# Patient Record
Sex: FEMALE | Race: WHITE | NOT HISPANIC OR LATINO | Employment: OTHER | URBAN - METROPOLITAN AREA
[De-identification: names, ages, dates, MRNs, and addresses within clinical notes are randomized per-mention and may not be internally consistent; named-entity substitution may affect disease eponyms.]

---

## 2017-01-04 ENCOUNTER — OFFICE VISIT (OUTPATIENT)
Dept: PODIATRY | Facility: CLINIC | Age: 62
End: 2017-01-04
Payer: COMMERCIAL

## 2017-01-04 ENCOUNTER — OFFICE VISIT (OUTPATIENT)
Dept: INFECTIOUS DISEASES | Facility: CLINIC | Age: 62
End: 2017-01-04
Payer: COMMERCIAL

## 2017-01-04 VITALS
BODY MASS INDEX: 48.93 KG/M2 | HEART RATE: 82 BPM | SYSTOLIC BLOOD PRESSURE: 127 MMHG | DIASTOLIC BLOOD PRESSURE: 77 MMHG | HEIGHT: 64 IN | WEIGHT: 286.63 LBS | TEMPERATURE: 99 F

## 2017-01-04 VITALS
RESPIRATION RATE: 18 BRPM | HEART RATE: 73 BPM | WEIGHT: 286 LBS | BODY MASS INDEX: 48.83 KG/M2 | SYSTOLIC BLOOD PRESSURE: 156 MMHG | DIASTOLIC BLOOD PRESSURE: 76 MMHG | HEIGHT: 64 IN

## 2017-01-04 DIAGNOSIS — Z89.422 STATUS POST AMPUTATION OF LESSER TOE OF LEFT FOOT: ICD-10-CM

## 2017-01-04 DIAGNOSIS — M86.172 OSTEOMYELITIS OF ANKLE OR FOOT, ACUTE, LEFT: Primary | ICD-10-CM

## 2017-01-04 DIAGNOSIS — N18.30 CKD (CHRONIC KIDNEY DISEASE) STAGE 3, GFR 30-59 ML/MIN: ICD-10-CM

## 2017-01-04 DIAGNOSIS — Z47.89 AFTERCARE FOLLOWING SURGERY OF THE MUSCULOSKELETAL SYSTEM, NEC: Primary | ICD-10-CM

## 2017-01-04 DIAGNOSIS — M25.521 RIGHT ELBOW PAIN: ICD-10-CM

## 2017-01-04 PROCEDURE — 3046F HEMOGLOBIN A1C LEVEL >9.0%: CPT | Mod: S$GLB,,, | Performed by: INTERNAL MEDICINE

## 2017-01-04 PROCEDURE — 3074F SYST BP LT 130 MM HG: CPT | Mod: S$GLB,,, | Performed by: INTERNAL MEDICINE

## 2017-01-04 PROCEDURE — 4010F ACE/ARB THERAPY RXD/TAKEN: CPT | Mod: S$GLB,,, | Performed by: INTERNAL MEDICINE

## 2017-01-04 PROCEDURE — 2022F DILAT RTA XM EVC RTNOPTHY: CPT | Mod: S$GLB,,, | Performed by: INTERNAL MEDICINE

## 2017-01-04 PROCEDURE — 99999 PR PBB SHADOW E&M-EST. PATIENT-LVL III: CPT | Mod: PBBFAC,,, | Performed by: PODIATRIST

## 2017-01-04 PROCEDURE — 99024 POSTOP FOLLOW-UP VISIT: CPT | Mod: S$GLB,,, | Performed by: PODIATRIST

## 2017-01-04 PROCEDURE — 1159F MED LIST DOCD IN RCRD: CPT | Mod: S$GLB,,, | Performed by: INTERNAL MEDICINE

## 2017-01-04 PROCEDURE — 99213 OFFICE O/P EST LOW 20 MIN: CPT | Mod: S$GLB,,, | Performed by: INTERNAL MEDICINE

## 2017-01-04 PROCEDURE — 3078F DIAST BP <80 MM HG: CPT | Mod: S$GLB,,, | Performed by: INTERNAL MEDICINE

## 2017-01-04 PROCEDURE — 99999 PR PBB SHADOW E&M-EST. PATIENT-LVL III: CPT | Mod: PBBFAC,,, | Performed by: INTERNAL MEDICINE

## 2017-01-04 RX ORDER — DOXYCYCLINE HYCLATE 100 MG
100 TABLET ORAL 2 TIMES DAILY
Qty: 28 TABLET | Refills: 3 | Status: SHIPPED | OUTPATIENT
Start: 2017-01-04 | End: 2017-08-30 | Stop reason: SDUPTHER

## 2017-01-04 NOTE — MR AVS SNAPSHOT
[place] [facility]   [facility]  [place] [address]  Phone: [phone]                  Kylie King   2017 1:45 PM   Office Visit    Description:  Female : 1955   Provider:  Jose Delacruz Jr., DPM   Department:  [place] [facility]           Reason for Visit     PCP     Diabetic Foot Exam     Nail Care     Post-op Evaluation           Diagnoses this Visit        Comments    Aftercare following surgery of the musculoskeletal system, NEC    -  Primary     Status post amputation of lesser toe of left foot                To Do List           Future Appointments        Provider Department Dept Phone    2017 10:30 AM Spencer Roldan MD [facility] - Infectious Diseases [phone]    3/8/2017 10:45 AM Jose Delacruz Jr., DPM [place] [facility] [phone]      Goals (5 Years of Data)     None      Follow-Up and Disposition     Return in about 2 months (around 3/4/2017).    Follow-up and Disposition History      [facility] On [facility]     [facility] -  Assistance  Registered nurses in the [facility] Center provide clinical advisement, health education, appointment booking, and other advisory services.  Call for this free service at [phone].             Medications           Message regarding Medications     Verify the changes and/or additions to your medication regime listed below are the same as discussed with your clinician today.  If any of these changes or additions are incorrect, please notify your healthcare provider.             Verify that the below list of medications is an accurate representation of the medications you are currently taking.  If none reported, the list may be blank. If incorrect, please contact your healthcare provider. Carry this list with you in case of emergency.           Current Medications     ACETAMINOPHEN WITH CODEINE (TYLENOL-CODEINE #3 ORAL) Take by mouth daily as needed.    amlodipine (NORVASC) 10 MG  "tablet Take 1 tablet (10 mg total) by mouth every evening.    blood sugar diagnostic (ONETOUCH ULTRA TEST) Strp 1 strip by Misc.(Non-Drug; Combo Route) route 4 (four) times daily before meals and nightly.    carvedilol (COREG) 25 MG tablet Take 1 tablet (25 mg total) by mouth 2 (two) times daily.    citalopram (CELEXA) 20 MG tablet Take 1 tablet (20 mg total) by mouth nightly.    doxycycline (VIBRA-TABS) 100 MG tablet Take 1 tablet (100 mg total) by mouth 2 (two) times daily. Remain upright for 60 minutes after each dose.    furosemide (LASIX) 40 MG tablet Take 1 tablet (40 mg total) by mouth once daily.    gabapentin (NEURONTIN) 400 MG capsule Take 1 capsule (400 mg total) by mouth 2 (two) times daily.    hydrALAZINE (APRESOLINE) 50 MG tablet Take 1 tablet (50 mg total) by mouth every 8 (eight) hours.    insulin detemir (LEVEMIR FLEXTOUCH) 100 unit/mL (3 mL) SubQ InPn pen Inject 45 Units into the skin 2 (two) times daily.    insulin lispro (HUMALOG KWIKPEN) 100 unit/mL InPn pen -200, take 15 units subcutaneous, 201-250, take 18 units subcutaneous, 251-300, take 20 units subcutaneous 3 times daily    insulin needles, disposable, (PEN NEEDLE, DIABETIC) 31 Ndle Patient needs 5 needles a day    insulin needles, disposable, 32 x 5/32 " Ndle 1 Device by Misc.(Non-Drug; Combo Route) route 5 (five) times daily.    lancets (ONETOUCH DELICA LANCETS) 33 gauge Misc 1 lancet by Misc.(Non-Drug; Combo Route) route 4 (four) times daily before meals and nightly.    letrozole (FEMARA) 2.5 mg Tab Take 1 tablet (2.5 mg total) by mouth every evening.    lisinopril (PRINIVIL,ZESTRIL) 40 MG tablet Take 1 tablet (40 mg total) by mouth once daily. 1 Tablet Oral Every day    pravastatin (PRAVACHOL) 40 MG tablet Take 1 tablet (40 mg total) by mouth every evening.           Clinical Reference Information           Vital Signs - Last Recorded  Most recent update: 1/4/2017  2:10 PM by Enid Mallory    BP Pulse Resp Ht Wt BMI    (!) " "156/76 73 18 5' 4" (1.626 m) 129.7 kg (286 lb) 49.09 kg/m2      Blood Pressure          Most Recent Value    BP  (!)  156/76      Allergies as of 1/4/2017     Cyclobenzaprine    Erythromycin    Keflex [Cephalexin]      Immunizations Administered on Date of Encounter - 1/4/2017     None      Instructions      Diabetes: Inspecting Your Feet  Diabetes increases your chances of developing foot problems. So inspect your feet every day. This helps you find small skin irritations before they become serious ulcers or infections. If you have trouble seeing the bottoms of your feet, use a mirror or ask a family member or friend to help.     Pressure spots on the bottom of the foot are common areas where problems develop.   How to check your feet  Below are tips to help you look for foot problems. Try to check your feet at the same time each day, such as when you get out of bed in the morning:  · Check the top of each foot. The tops of toes, back of the heel, and outer edge of the foot can get a lot of rubbing from poor-fitting shoes.  · Check the bottom of each foot. Daily wear and tear often leads to problems at pressure spots.  · Check the toes and nails. Fungal infections often occur between toes. Toenail problems can also be a sign of fungal infections or lead to breaks in the skin.  · Check your shoes, too. Loose objects inside a shoe can injure the foot. Use your hand to feel inside your shoes for things like marco, loose stitching, or rough areas that could irritate your skin.  Warning signs  Look for any color changes in the foot. Redness with streaks can signal a severe infection, which needs immediate medical attention. Tell your healthcare provider right away if you have any of these problems:  · Swelling, sometimes with color changes, may be a sign of poor blood flow or infection. Symptoms include tenderness and an increase in the size of your foot.  · Warm or hot areas on your feet may be signs of infection. A " foot that is cold may not be getting enough blood.  · Sensations such as burning, tingling, or pins and needles can be signs of a problem. Also check for areas that may be numb.  · Hot spots are caused by friction or pressure. Look for hot spots in areas that get a lot of rubbing. Hot spots can turn into blisters, calluses, or sores.  · Cracks and sores are caused by dry or irritated skin. They are a sign that the skin is breaking down, which can lead to infection.  · Toenail problems to watch for include nails growing into the skin (ingrown toenail) and causing redness or pain. Thick, yellow, or discolored nails can signal a fungal infection.  · Drainage and odor can develop from untreated sores and ulcers. Call your healthcare provider right away if you notice white or yellow drainage, bleeding, or unpleasant odor.   © 7953-2142 [employer]. [address] [place] [address]. All rights reserved. This information is not intended as a substitute for professional medical care. Always follow your healthcare professional's instructions.             Smoking Cessation     If you would like to quit smoking:   You may be eligible for free services if you are a [place] resident and started smoking cigarettes before September 1, 1988.  Call the [employer]oking [employer]essation [employer] ([employer]) toll free at (095) 328-5569 or (649) 851-4081.   Call 7-800-QUIT-NOW if you do not meet the above criteria.

## 2017-01-04 NOTE — PROGRESS NOTES
Subjective:      Patient ID: Kylie King is a 61 y.o. female.    Chief Complaint:Follow-up      History of Present Illness  Patient had a PICC line removed successfully.  Stitches have come out of her toe.  Her wound is completely healed.  She continues to have problems with her right arm and shoulder.  She states that she has tingling fingers in her right hand and pain in her right shoulder.  She is awaiting an orthopedics referral for this.  She was recently treated with doxycycline for cellulitis of both legs, dominantly on her right leg.  She finished her doxycycline yesterday.  She states that this redness began 2 days after finishing her IV Rocephin.    Review of Systems   Constitution: Positive for weakness and malaise/fatigue. Negative for chills, decreased appetite, fever, night sweats, weight gain and weight loss.   HENT: Positive for congestion. Negative for ear pain, headaches, hearing loss, hoarse voice, sore throat and tinnitus.    Eyes: Negative for blurred vision, redness and visual disturbance.   Cardiovascular: Negative for chest pain, leg swelling and palpitations.   Respiratory: Negative for cough, hemoptysis, shortness of breath and sputum production.    Hematologic/Lymphatic: Negative for adenopathy. Does not bruise/bleed easily.   Skin: Negative for dry skin, itching, rash and suspicious lesions.   Musculoskeletal: Positive for back pain, joint pain, myalgias and neck pain.   Gastrointestinal: Negative for abdominal pain, constipation, diarrhea, heartburn, nausea and vomiting.   Genitourinary: Positive for frequency. Negative for dysuria, flank pain, hematuria, hesitancy and urgency.   Neurological: Negative for dizziness, numbness and paresthesias.   Psychiatric/Behavioral: Negative for depression and memory loss. The patient does not have insomnia and is not nervous/anxious.      Objective:   Physical Exam   Constitutional: She is oriented to person, place, and time. She appears  well-developed and well-nourished.   HENT:   Head: Normocephalic and atraumatic.   Eyes: Conjunctivae and EOM are normal. Pupils are equal, round, and reactive to light.   Cardiovascular: Normal rate, regular rhythm and normal heart sounds.    Pulmonary/Chest: Effort normal and breath sounds normal.       Abdominal: Soft. Bowel sounds are normal.   Musculoskeletal: Normal range of motion. She exhibits edema (Mild edema of both legs).        Right forearm: Normal. She exhibits no tenderness, no swelling, no edema and no deformity.   Neurological: She is alert and oriented to person, place, and time.   Skin: Skin is warm and dry.   Psychiatric: She has a normal mood and affect. Her behavior is normal. Judgment and thought content normal.   Nursing note and vitals reviewed.            Assessment:       1. Osteomyelitis of ankle or foot, acute, left    2. CKD (chronic kidney disease) stage 3, GFR 30-59 ml/min    3. DM type 2, uncontrolled, with neuropathy    4. Right elbow pain          Plan:       Patient will restart doxycycline if cellulitis returns.  Check ESR and CRP today for treatment of osteomyelitis.  Refer to orthopedic surgery for right arm and leg pain.

## 2017-01-04 NOTE — PROGRESS NOTES
Subjective:      Patient ID: Kylie King is a 61 y.o. female.    Chief Complaint: PCP ( Virginia Foote MD 12/27/16); Diabetic Foot Exam; Nail Care; and Post-op Evaluation (3rd toe left foot )      HPI:   DOS: 11/25/16  Procedure: L 3rd toe distal phalanx amputation for osteomyelitis    Kylie King returns to the clinic today for her 4th postop apt. Pt is 5 wks s/p above procedure. Kylie King rates pain at a 2/10 on visual analog scale.     Wound Check   There has been no drainage from the wound. The redness has improved. The swelling has improved. The pain has no pain.     Past Medical History   Diagnosis Date    Anxiety     Breast cancer 1/2013     left breast- invasive mammary carcinoma, ER/IL positive, Her2 negative    Choledocholithiasis      s/p ERCP and stent placement    Colon cancer 2001    CRI (chronic renal insufficiency)      one kidney    GERD (gastroesophageal reflux disease)     History of acute pancreatitis 2009 2/09 s/p sphincterotomy    History of colon cancer      s/p sigmoid colectomy    HTN (hypertension), benign     Hyperlipidemia     Intracerebral hemorrhage     Kidney stone      left    Obesity     Pancreatitis     Pancreatitis 2008    Stroke 12/2012    Tobacco abuse     Type 2 diabetes mellitus with neurological manifestations, controlled      Past Surgical History   Procedure Laterality Date    Sigmoidectomy  2001    Cholecystectomy  2001    Left oopherectomy  2001    Left nephrectomy       atrophic kidney and hydronephrosis    Breast biopsy  1/2012     left breast invasive mammary carcinoma (lateral bx) and intraductal papilloma (medial bx)    Breast lumpectomy  1999     right breast- benign fibroadenoma    Colon surgery      Nephrectomy  2011     lap    Mastectomy  2013     left    Breast reconstruction  2013    Hernia repair       Social History     Social History    Marital status:      Spouse name: N/A    Number of  children: 2    Years of education: N/A     Occupational History    not working [employer]     Social History Main Topics    Smoking status: Current Every Day Smoker     Packs/day: 0.50     Types: Cigarettes    Smokeless tobacco: Never Used      Comment: anxious    Alcohol use No    Drug use: No    Sexual activity: No     Other Topics Concern    Not on file     Social History Narrative    She is not exercising regularly         Current Outpatient Prescriptions:     ACETAMINOPHEN WITH CODEINE (TYLENOL-CODEINE #3 ORAL), Take by mouth daily as needed., Disp: , Rfl:     amlodipine (NORVASC) 10 MG tablet, Take 1 tablet (10 mg total) by mouth every evening. (Patient taking differently: Take 10 mg by mouth every morning. ), Disp: 90 tablet, Rfl: 4    blood sugar diagnostic (ONETOUCH ULTRA TEST) Strp, 1 strip by Misc.(Non-Drug; Combo Route) route 4 (four) times daily before meals and nightly., Disp: 400 strip, Rfl: 4    carvedilol (COREG) 25 MG tablet, Take 1 tablet (25 mg total) by mouth 2 (two) times daily., Disp: 180 tablet, Rfl: 4    citalopram (CELEXA) 20 MG tablet, Take 1 tablet (20 mg total) by mouth nightly., Disp: 90 tablet, Rfl: 4    doxycycline (VIBRA-TABS) 100 MG tablet, Take 1 tablet (100 mg total) by mouth 2 (two) times daily. Remain upright for 60 minutes after each dose., Disp: 28 tablet, Rfl: 3    furosemide (LASIX) 40 MG tablet, Take 1 tablet (40 mg total) by mouth once daily., Disp: 90 tablet, Rfl: 4    gabapentin (NEURONTIN) 400 MG capsule, Take 1 capsule (400 mg total) by mouth 2 (two) times daily., Disp: 180 capsule, Rfl: 0    hydrALAZINE (APRESOLINE) 50 MG tablet, Take 1 tablet (50 mg total) by mouth every 8 (eight) hours., Disp: 270 tablet, Rfl: 0    insulin detemir (LEVEMIR FLEXTOUCH) 100 unit/mL (3 mL) SubQ InPn pen, Inject 45 Units into the skin 2 (two) times daily., Disp: 5 Box, Rfl: 4    insulin lispro (HUMALOG KWIKPEN) 100 unit/mL InPn pen, -200, take 15 units  "subcutaneous, 201-250, take 18 units subcutaneous, 251-300, take 20 units subcutaneous 3 times daily, Disp: 3 Box, Rfl: 4    insulin needles, disposable, (PEN NEEDLE, DIABETIC) 31 Ndle, Patient needs 5 needles a day, Disp: 500 each, Rfl: 3    insulin needles, disposable, 32 x 5/32 " Ndle, 1 Device by Misc.(Non-Drug; Combo Route) route 5 (five) times daily., Disp: 400 each, Rfl: 6    lancets (ONETOUCH DELICA LANCETS) 33 gauge Misc, 1 lancet by Misc.(Non-Drug; Combo Route) route 4 (four) times daily before meals and nightly., Disp: 400 each, Rfl: 4    letrozole (FEMARA) 2.5 mg Tab, Take 1 tablet (2.5 mg total) by mouth every evening., Disp: 90 tablet, Rfl: 3    lisinopril (PRINIVIL,ZESTRIL) 40 MG tablet, Take 1 tablet (40 mg total) by mouth once daily. 1 Tablet Oral Every day, Disp: 90 tablet, Rfl: 4    pravastatin (PRAVACHOL) 40 MG tablet, Take 1 tablet (40 mg total) by mouth every evening., Disp: 90 tablet, Rfl: 4  Review of patient's allergies indicates:   Allergen Reactions    Cyclobenzaprine Other (See Comments)     Patient stated causes hallucinations.     Erythromycin      Other reaction(s): Stomach upset    Keflex [cephalexin]      Yeast infection       Visit Vitals    BP (!) 156/76    Pulse 73    Resp 18    Ht 5' 4" (1.626 m)    Wt 129.7 kg (286 lb)    BMI 49.09 kg/m2       Review of Systems   Constitution: Negative for fever, weakness, malaise/fatigue and night sweats.     Vascular: negative for cramps, edema and bruising  Musculoskeletal: negative for joint paint and joint edema  Skin: negative for rashes and lesions  Neurological: positive for burning, tingling, numbness  Gastrointestinal: negative for stomach pain, nausea and vomiting    ROS Constitutional:  General Appearance: well nourished        Objective:        Ortho/SPM Exam  Physical Exam    General : alert, cooperative, no distress, appears stated age    Neurovasc: intact    Skin: Well coapted     Sutures/staples: none     Signs of " local infection: none    Ortho:     Gait: n/a.     Tenderness: mild. No calf pain on compression. No pain on passive stretch     Flexion ROM: diminished range of motion     Extension ROM: diminished range of motion     MMT: pt can wiggle remaining toes        Assessment:     Imaging:    X-ray Foot Complete Left    Result Date: 11/26/2016  Technique: AP, oblique, and lateral views of the left foot Comparison: 11/9/2016 Findings: The patient has undergone interval partial amputation of the left third toe to the level of the middle phalanx. There is cortical irregularity of the distal aspect of the middle pharynx presumably postsurgical in nature. There is soft tissue swelling about the third digit with overlying bandaging material present. The remaining osseous structures appear intact without evidence of aggressive osseous destructive process or periosteal reaction.    As above. Electronically signed by: REJI HARRISON Date:     11/26/16 Time:    04:09       Component      Latest Ref Rng 11/25/2016           4:33 PM   Aerobic Bacterial Culture       STAPHYLOCOCCUS EPIDERMIDIS . . .     Aerobic Bacterial Culture STAPHYLOCOCCUS SIMULANS  Few    Resulting Agency [facility]   Susceptibility    Staphylococcus simulans     CULTURE, AEROBIC  (SPECIFY SOURCE)     Clindamycin <=0.5 mcg/mL Resistant     Erythromycin 1 mcg/mL Intermediate     Oxacillin 2 mcg/mL Resistant     Penicillin 8 mcg/mL Resistant     Tetracycline >8 mcg/mL Resistant     Trimeth/Sulfa >2/38 mcg/mL Resistant     Vancomycin 1 mcg/mL Sensitive           Narrative   Distal phalanx left 3rd toe, bone and soft tissue          Aerobic Bacterial Culture STAPHYLOCOCCUS EPIDERMIDIS  From broth only    Resulting Agency [facility]   Susceptibility    Staphylococcus epidermidis     CULTURE, AEROBIC  (SPECIFY SOURCE)     Clindamycin <=0.5 mcg/mL Sensitive     Erythromycin 1 mcg/mL Intermediate     Oxacillin 2 mcg/mL Resistant     Penicillin 2 mcg/mL Resistant     Tetracycline >8  mcg/mL Resistant     Trimeth/Sulfa >2/38 mcg/mL Resistant     Vancomycin 2 mcg/mL Sensitive           Narrative   Middle phalanx bone , clearing fragment        FINAL PATHOLOGIC DIAGNOSIS  1. DISTAL PHALYNX LEFT THIRD TOE, AMPUTATION:  Portion of toe with predominantly chronic and focal acute inflammation with reactive changes    2. MIDDLE PHALYNX: Minute fragments of bone and cartilage with reactive changes  Diagnosed by: Hazel Malone M.D.  (Electronically Signed: 2016-11-30 15:44:29)        1. Aftercare following surgery of the musculoskeletal system, NEC    2. Status post amputation of lesser toe of left foot          Plan:          .   Kylie was seen today for pcp, diabetic foot exam, nail care and post-op evaluation.    Diagnoses and all orders for this visit:    Aftercare following surgery of the musculoskeletal system, NEC    Status post amputation of lesser toe of left foot        Kylie King is a 61 y.o. female presenting w/   1. Aftercare following surgery of the musculoskeletal system, NEC    2. Status post amputation of lesser toe of left foot         ADA Risk Classification: Hx of ulcer or amputation - 3: rtc 1-2 months    62 yo F 5 wks s/p L 3rd toe distal phalanx amputation for osteomyelitis    -pt seen, evaluated, and managed  -dx discussed in detail. All questions/concerns addressed  -all tx options discussed. All alternatives, risks, benefits of all txs discussed  -cont abx per ID  -The patient was educated regarding the above diagnosis. We discussed conservative care options regarding shoe wear and/or padding.  -WBAT  -this is pts last post op apt unless she develops complcation  -will plan to see Q2 months for observation and rountine DM footcare      Return in about 2 months (around 3/4/2017).

## 2017-01-04 NOTE — MR AVS SNAPSHOT
[facility] - Infectious Diseases  [address]  [facility] [address]  Phone: [phone]  Fax: [phone]                  Kylie King   2017 10:00 AM   Office Visit    Description:  Female : 1955   Provider:  Spencer Roldan MD   Department:  [facility] - Infectious Diseases           Reason for Visit     Follow-up           Diagnoses this Visit        Comments    Osteomyelitis of ankle or foot, acute, left    -  Primary            To Do List           Future Appointments        Provider Department Dept Phone    2017 11:05 AM LAB, APPOINTMENT [place] [facility] [phone]    2017 1:45 PM Jose Delacruz Jr., DPM [place] [facility] [phone]    2017 10:30 AM Spencer Roldan MD [facility] Infectious Diseases [phone]      Goals (5 Years of Data)     None       These Medications        Disp Refills Start End    doxycycline (VIBRA-TABS) 100 MG tablet 28 tablet 3 2017     Take 1 tablet (100 mg total) by mouth 2 (two) times daily. Remain upright for 60 minutes after each dose. - Oral    Pharmacy: [facility] [address] [place] [address] (N), [place] - [address]  #: [phone]         [facility]     [facility] Care [facility] -  Assistance  Registered nurses in the [facility] provide clinical advisement, health education, appointment booking, and other advisory services.  Call for this free service at [phone].             Medications           Message regarding Medications     Verify the changes and/or additions to your medication regime listed below are the same as discussed with your clinician today.  If any of these changes or additions are incorrect, please notify your healthcare provider.             Verify that the below list of medications is an accurate representation of the medications you are currently taking.  If none reported, the list may be blank. If incorrect, please  "contact your healthcare provider. Carry this list with you in case of emergency.           Current Medications     ACETAMINOPHEN WITH CODEINE (TYLENOL-CODEINE #3 ORAL) Take by mouth daily as needed.    amlodipine (NORVASC) 10 MG tablet Take 1 tablet (10 mg total) by mouth every evening.    blood sugar diagnostic (ONETOUCH ULTRA TEST) Strp 1 strip by Misc.(Non-Drug; Combo Route) route 4 (four) times daily before meals and nightly.    carvedilol (COREG) 25 MG tablet Take 1 tablet (25 mg total) by mouth 2 (two) times daily.    citalopram (CELEXA) 20 MG tablet Take 1 tablet (20 mg total) by mouth nightly.    doxycycline (VIBRA-TABS) 100 MG tablet Take 1 tablet (100 mg total) by mouth 2 (two) times daily. Remain upright for 60 minutes after each dose.    furosemide (LASIX) 40 MG tablet Take 1 tablet (40 mg total) by mouth once daily.    gabapentin (NEURONTIN) 400 MG capsule Take 1 capsule (400 mg total) by mouth 2 (two) times daily.    hydrALAZINE (APRESOLINE) 50 MG tablet Take 1 tablet (50 mg total) by mouth every 8 (eight) hours.    insulin detemir (LEVEMIR FLEXTOUCH) 100 unit/mL (3 mL) SubQ InPn pen Inject 45 Units into the skin 2 (two) times daily.    insulin lispro (HUMALOG KWIKPEN) 100 unit/mL InPn pen -200, take 15 units subcutaneous, 201-250, take 18 units subcutaneous, 251-300, take 20 units subcutaneous 3 times daily    insulin needles, disposable, (PEN NEEDLE, DIABETIC) 31 Ndle Patient needs 5 needles a day    insulin needles, disposable, 32 x 5/32 " Ndle 1 Device by Misc.(Non-Drug; Combo Route) route 5 (five) times daily.    lancets (ONETOUCH DELICA LANCETS) 33 gauge Misc 1 lancet by Misc.(Non-Drug; Combo Route) route 4 (four) times daily before meals and nightly.    letrozole (FEMARA) 2.5 mg Tab Take 1 tablet (2.5 mg total) by mouth every evening.    lisinopril (PRINIVIL,ZESTRIL) 40 MG tablet Take 1 tablet (40 mg total) by mouth once daily. 1 Tablet Oral Every day    pravastatin (PRAVACHOL) 40 MG " "tablet Take 1 tablet (40 mg total) by mouth every evening.           Clinical Reference Information           Vital Signs - Last Recorded  Most recent update: 1/4/2017 10:31 AM by Annemarie Ramos MA    BP Pulse Temp Ht Wt BMI    127/77 82 98.5 °F (36.9 °C) (Oral) 5' 4" (1.626 m) 130 kg (286 lb 9.6 oz) 49.19 kg/m2      Blood Pressure          Most Recent Value    BP  127/77      Allergies as of 1/4/2017     Cyclobenzaprine    Erythromycin    Keflex [Cephalexin]      Immunizations Administered on Date of Encounter - 1/4/2017     None      Orders Placed During Today's Visit     Future Labs/Procedures Expected by Expires    C-reactive protein  1/4/2017 3/5/2018    Sedimentation rate, manual  1/4/2017 1/4/2018      Smoking Cessation     If you would like to quit smoking:   You may be eligible for free services if you are a [place] resident and started smoking cigarettes before September 1, 1988.  Call the Smoking Cessation Trust (SCT) toll free at ([phone] 041-4042 or (935) 912-3268.   Call 2-091-QUIT-NOW if you do not meet the above criteria.            "

## 2017-01-04 NOTE — PATIENT INSTRUCTIONS
Diabetes: Inspecting Your Feet  Diabetes increases your chances of developing foot problems. So inspect your feet every day. This helps you find small skin irritations before they become serious ulcers or infections. If you have trouble seeing the bottoms of your feet, use a mirror or ask a family member or friend to help.     Pressure spots on the bottom of the foot are common areas where problems develop.   How to check your feet  Below are tips to help you look for foot problems. Try to check your feet at the same time each day, such as when you get out of bed in the morning:  · Check the top of each foot. The tops of toes, back of the heel, and outer edge of the foot can get a lot of rubbing from poor-fitting shoes.  · Check the bottom of each foot. Daily wear and tear often leads to problems at pressure spots.  · Check the toes and nails. Fungal infections often occur between toes. Toenail problems can also be a sign of fungal infections or lead to breaks in the skin.  · Check your shoes, too. Loose objects inside a shoe can injure the foot. Use your hand to feel inside your shoes for things like marco, loose stitching, or rough areas that could irritate your skin.  Warning signs  Look for any color changes in the foot. Redness with streaks can signal a severe infection, which needs immediate medical attention. Tell your healthcare provider right away if you have any of these problems:  · Swelling, sometimes with color changes, may be a sign of poor blood flow or infection. Symptoms include tenderness and an increase in the size of your foot.  · Warm or hot areas on your feet may be signs of infection. A foot that is cold may not be getting enough blood.  · Sensations such as burning, tingling, or pins and needles can be signs of a problem. Also check for areas that may be numb.  · Hot spots are caused by friction or pressure. Look for hot spots in areas that get a lot of rubbing. Hot spots can turn into  blisters, calluses, or sores.  · Cracks and sores are caused by dry or irritated skin. They are a sign that the skin is breaking down, which can lead to infection.  · Toenail problems to watch for include nails growing into the skin (ingrown toenail) and causing redness or pain. Thick, yellow, or discolored nails can signal a fungal infection.  · Drainage and odor can develop from untreated sores and ulcers. Call your healthcare provider right away if you notice white or yellow drainage, bleeding, or unpleasant odor.   © 3775-6736 [employer]. [address] [place] [address], [place] [address]. All rights reserved. This information is not intended as a substitute for professional medical care. Always follow your healthcare professional's instructions.

## 2017-01-18 DIAGNOSIS — E78.5 HYPERLIPIDEMIA: ICD-10-CM

## 2017-01-18 DIAGNOSIS — I10 ESSENTIAL HYPERTENSION: ICD-10-CM

## 2017-01-18 RX ORDER — PRAVASTATIN SODIUM 40 MG/1
TABLET ORAL
Qty: 90 TABLET | Refills: 3 | Status: SHIPPED | OUTPATIENT
Start: 2017-01-18 | End: 2017-12-08 | Stop reason: SDUPTHER

## 2017-01-18 RX ORDER — AMLODIPINE BESYLATE 10 MG/1
TABLET ORAL
Qty: 90 TABLET | Refills: 3 | Status: SHIPPED | OUTPATIENT
Start: 2017-01-18 | End: 2017-12-08 | Stop reason: SDUPTHER

## 2017-01-18 RX ORDER — LISINOPRIL 40 MG/1
TABLET ORAL
Qty: 90 TABLET | Refills: 3 | Status: SHIPPED | OUTPATIENT
Start: 2017-01-18 | End: 2017-12-08 | Stop reason: SDUPTHER

## 2017-01-26 DIAGNOSIS — I10 ESSENTIAL HYPERTENSION: ICD-10-CM

## 2017-01-26 DIAGNOSIS — F32.A DEPRESSION: ICD-10-CM

## 2017-01-26 RX ORDER — INSULIN LISPRO 100 [IU]/ML
INJECTION, SOLUTION INTRAVENOUS; SUBCUTANEOUS
Qty: 45 ML | Refills: 3 | Status: SHIPPED | OUTPATIENT
Start: 2017-01-26 | End: 2017-12-08 | Stop reason: SDUPTHER

## 2017-01-26 RX ORDER — HYDRALAZINE HYDROCHLORIDE 50 MG/1
TABLET, FILM COATED ORAL
Qty: 270 TABLET | Refills: 3 | Status: SHIPPED | OUTPATIENT
Start: 2017-01-26 | End: 2017-12-08 | Stop reason: SDUPTHER

## 2017-01-26 RX ORDER — INSULIN DETEMIR 100 [IU]/ML
INJECTION, SOLUTION SUBCUTANEOUS
Qty: 75 ML | Refills: 3 | Status: SHIPPED | OUTPATIENT
Start: 2017-01-26 | End: 2017-12-08 | Stop reason: SDUPTHER

## 2017-01-26 RX ORDER — PEN NEEDLE, DIABETIC 31 GX5/16"
NEEDLE, DISPOSABLE MISCELLANEOUS
Qty: 450 EACH | Refills: 2 | Status: SHIPPED | OUTPATIENT
Start: 2017-01-26 | End: 2021-01-01

## 2017-01-26 RX ORDER — CITALOPRAM 20 MG/1
TABLET, FILM COATED ORAL
Qty: 90 TABLET | Refills: 3 | Status: SHIPPED | OUTPATIENT
Start: 2017-01-26 | End: 2017-12-08 | Stop reason: SDUPTHER

## 2017-02-09 ENCOUNTER — OFFICE VISIT (OUTPATIENT)
Dept: INFECTIOUS DISEASES | Facility: CLINIC | Age: 62
End: 2017-02-09
Payer: COMMERCIAL

## 2017-02-09 VITALS
DIASTOLIC BLOOD PRESSURE: 67 MMHG | SYSTOLIC BLOOD PRESSURE: 128 MMHG | WEIGHT: 274.25 LBS | HEART RATE: 81 BPM | BODY MASS INDEX: 46.82 KG/M2 | HEIGHT: 64 IN | TEMPERATURE: 99 F

## 2017-02-09 DIAGNOSIS — N30.00 ACUTE CYSTITIS WITHOUT HEMATURIA: ICD-10-CM

## 2017-02-09 DIAGNOSIS — M86.172 ACUTE OSTEOMYELITIS OF LEFT FOOT: ICD-10-CM

## 2017-02-09 DIAGNOSIS — N18.30 CKD (CHRONIC KIDNEY DISEASE) STAGE 3, GFR 30-59 ML/MIN: ICD-10-CM

## 2017-02-09 DIAGNOSIS — Z89.422 STATUS POST AMPUTATION OF LESSER TOE OF LEFT FOOT: Primary | ICD-10-CM

## 2017-02-09 PROCEDURE — 99213 OFFICE O/P EST LOW 20 MIN: CPT | Mod: S$GLB,,, | Performed by: INTERNAL MEDICINE

## 2017-02-09 PROCEDURE — 3078F DIAST BP <80 MM HG: CPT | Mod: S$GLB,,, | Performed by: INTERNAL MEDICINE

## 2017-02-09 PROCEDURE — 99999 PR PBB SHADOW E&M-EST. PATIENT-LVL III: CPT | Mod: PBBFAC,,, | Performed by: INTERNAL MEDICINE

## 2017-02-09 PROCEDURE — 3074F SYST BP LT 130 MM HG: CPT | Mod: S$GLB,,, | Performed by: INTERNAL MEDICINE

## 2017-02-09 RX ORDER — CIPROFLOXACIN 500 MG/1
500 TABLET ORAL 2 TIMES DAILY
Qty: 20 TABLET | Refills: 0 | Status: SHIPPED | OUTPATIENT
Start: 2017-02-09 | End: 2017-02-19

## 2017-02-09 NOTE — PROGRESS NOTES
Subjective:      Patient ID: Kylie King is a 61 y.o. female.    Chief Complaint:Follow-up      History of Present Illness  Still has shoulder and knee pain. Cellulitis is resolved. Has been off doxycycline for 2 weeks. Toe feels well. Thinks she is getting a UTI - has some dysuria.    Review of Systems   Constitution: Positive for chills, fever, weakness and malaise/fatigue. Negative for decreased appetite, night sweats, weight gain and weight loss.   HENT: Positive for congestion and sore throat. Negative for ear pain, headaches, hearing loss, hoarse voice and tinnitus.    Eyes: Negative for blurred vision, redness and visual disturbance.   Cardiovascular: Negative for chest pain, leg swelling and palpitations.   Respiratory: Positive for cough, sputum production and wheezing. Negative for hemoptysis and shortness of breath.    Hematologic/Lymphatic: Negative for adenopathy. Bruises/bleeds easily.   Skin: Positive for dry skin and itching. Negative for rash and suspicious lesions.   Musculoskeletal: Positive for back pain, joint pain, myalgias and neck pain.   Gastrointestinal: Negative for abdominal pain, constipation, diarrhea, heartburn, nausea and vomiting.   Genitourinary: Positive for frequency. Negative for dysuria, flank pain, hematuria, hesitancy and urgency.   Neurological: Positive for dizziness. Negative for numbness and paresthesias.   Psychiatric/Behavioral: Negative for depression and memory loss. The patient does not have insomnia and is not nervous/anxious.      Objective:   Physical Exam   Constitutional: She is oriented to person, place, and time. She appears well-developed and well-nourished.   HENT:   Head: Normocephalic and atraumatic.   Eyes: Conjunctivae and EOM are normal. Pupils are equal, round, and reactive to light.   Cardiovascular: Normal rate, regular rhythm and normal heart sounds.    Pulmonary/Chest: Effort normal and breath sounds normal.   Abdominal: Soft. Bowel sounds are  normal.   Musculoskeletal: Normal range of motion. She exhibits edema (Mild edema of both legs).        Right forearm: Normal. She exhibits no tenderness, no swelling, no edema and no deformity.   Neurological: She is alert and oriented to person, place, and time.   Skin: Skin is warm and dry.   Psychiatric: She has a normal mood and affect. Her behavior is normal. Judgment and thought content normal.   Nursing note and vitals reviewed.    Assessment:       1. Status post amputation of lesser toe of left foot    2. Acute osteomyelitis of left foot    3. CKD (chronic kidney disease) stage 3, GFR 30-59 ml/min    4. Acute cystitis without hematuria          Plan:       Start Cipro for UTI. Stop doxy. Refer to orthopedics for joint pains.

## 2017-03-08 ENCOUNTER — OFFICE VISIT (OUTPATIENT)
Dept: PODIATRY | Facility: CLINIC | Age: 62
End: 2017-03-08
Payer: COMMERCIAL

## 2017-03-08 VITALS
HEIGHT: 64 IN | DIASTOLIC BLOOD PRESSURE: 58 MMHG | SYSTOLIC BLOOD PRESSURE: 107 MMHG | HEART RATE: 70 BPM | WEIGHT: 274 LBS | BODY MASS INDEX: 46.78 KG/M2

## 2017-03-08 DIAGNOSIS — B35.1 ONYCHOMYCOSIS DUE TO DERMATOPHYTE: ICD-10-CM

## 2017-03-08 DIAGNOSIS — L84 CORN OR CALLUS: ICD-10-CM

## 2017-03-08 DIAGNOSIS — Z89.422 STATUS POST AMPUTATION OF LESSER TOE OF LEFT FOOT: ICD-10-CM

## 2017-03-08 DIAGNOSIS — E11.49 TYPE II DIABETES MELLITUS WITH NEUROLOGICAL MANIFESTATIONS: Primary | ICD-10-CM

## 2017-03-08 PROCEDURE — 3046F HEMOGLOBIN A1C LEVEL >9.0%: CPT | Mod: S$GLB,,, | Performed by: PODIATRIST

## 2017-03-08 PROCEDURE — 2022F DILAT RTA XM EVC RTNOPTHY: CPT | Mod: S$GLB,,, | Performed by: PODIATRIST

## 2017-03-08 PROCEDURE — 99213 OFFICE O/P EST LOW 20 MIN: CPT | Mod: 25,S$GLB,, | Performed by: PODIATRIST

## 2017-03-08 PROCEDURE — 11721 DEBRIDE NAIL 6 OR MORE: CPT | Mod: 59,S$GLB,, | Performed by: PODIATRIST

## 2017-03-08 PROCEDURE — 3074F SYST BP LT 130 MM HG: CPT | Mod: S$GLB,,, | Performed by: PODIATRIST

## 2017-03-08 PROCEDURE — 99999 PR PBB SHADOW E&M-EST. PATIENT-LVL III: CPT | Mod: PBBFAC,,, | Performed by: PODIATRIST

## 2017-03-08 PROCEDURE — 11057 PARNG/CUTG B9 HYPRKR LES >4: CPT | Mod: S$GLB,,, | Performed by: PODIATRIST

## 2017-03-08 PROCEDURE — 1160F RVW MEDS BY RX/DR IN RCRD: CPT | Mod: S$GLB,,, | Performed by: PODIATRIST

## 2017-03-08 PROCEDURE — 3078F DIAST BP <80 MM HG: CPT | Mod: S$GLB,,, | Performed by: PODIATRIST

## 2017-03-08 PROCEDURE — 4010F ACE/ARB THERAPY RXD/TAKEN: CPT | Mod: S$GLB,,, | Performed by: PODIATRIST

## 2017-03-08 NOTE — MR AVS SNAPSHOT
[place] - Podiatry   [facility]  [place] [address]  Phone: [phone]                  Kylie King   3/8/2017 10:45 AM   Office Visit    Description:  Female : 1955   Provider:  Jose Delacruz Jr., DPM   Department:  [place] - Podiatry           Reason for Visit     Diabetes Mellitus           Diagnoses this Visit        Comments    Type II diabetes mellitus with neurological manifestations    -  Primary     Onychomycosis due to dermatophyte         Corn or callus         Status post amputation of lesser toe of left foot                To Do List           Future Appointments        Provider Department Dept Phone    3/17/2017 2:00 PM Virginia Foote MD [facility] Internal Medicine [phone]    3/22/2017 9:30 AM Spencer Roldan MD [facility] - Infectious Diseases [phone]    2017 10:45 AM Jose Delacurz Jr., DPM [place] - Podiatry [phone]      Goals (5 Years of Data)     None      Follow-Up and Disposition     Return in about 2 months (around 2017).      [facility]     [facility] On Call Nurse Care Line - 24/ Assistance  Registered nurses in the [facility] On Call Center provide clinical advisement, health education, appointment booking, and other advisory services.  Call for this free service at [phone].             Medications           Message regarding Medications     Verify the changes and/or additions to your medication regime listed below are the same as discussed with your clinician today.  If any of these changes or additions are incorrect, please notify your healthcare provider.             Verify that the below list of medications is an accurate representation of the medications you are currently taking.  If none reported, the list may be blank. If incorrect, please contact your healthcare provider. Carry this list with you in case of emergency.           Current Medications     ACETAMINOPHEN WITH CODEINE (TYLENOL-CODEINE #3  "ORAL) Take by mouth daily as needed.    amlodipine (NORVASC) 10 MG tablet TAKE 1 TABLET EVERY EVENING    BD INSULIN PEN NEEDLE UF MINI 31 gauge x 3/16" Ndle USE 5 NEEDLES PER DAY    blood sugar diagnostic (ONETOUCH ULTRA TEST) Strp 1 strip by Misc.(Non-Drug; Combo Route) route 4 (four) times daily before meals and nightly.    carvedilol (COREG) 25 MG tablet Take 1 tablet (25 mg total) by mouth 2 (two) times daily.    citalopram (CELEXA) 20 MG tablet TAKE 1 TABLET NIGHTLY    doxycycline (VIBRA-TABS) 100 MG tablet Take 1 tablet (100 mg total) by mouth 2 (two) times daily. Remain upright for 60 minutes after each dose.    furosemide (LASIX) 40 MG tablet Take 1 tablet (40 mg total) by mouth once daily.    gabapentin (NEURONTIN) 400 MG capsule Take 1 capsule (400 mg total) by mouth 2 (two) times daily.    HUMALOG KWIKPEN 100 unit/mL InPn pen BLOOD GLUCOSE 150-200, INJECT 15 UNITS UNDER THE SKIN, 201-250, 18 UNITS, 251-300, 20 UNITS THREE TIMES A DAY AS DIRECTED    hydrALAZINE (APRESOLINE) 50 MG tablet TAKE 1 TABLET EVERY 8 HOURS    insulin needles, disposable, (PEN NEEDLE, DIABETIC) 31 Ndle Patient needs 5 needles a day    insulin needles, disposable, 32 x 5/32 " Ndle 1 Device by Misc.(Non-Drug; Combo Route) route 5 (five) times daily.    lancets (ONETOUCH DELICA LANCETS) 33 gauge Misc 1 lancet by Misc.(Non-Drug; Combo Route) route 4 (four) times daily before meals and nightly.    letrozole (FEMARA) 2.5 mg Tab Take 1 tablet (2.5 mg total) by mouth every evening.    LEVEMIR FLEXTOUCH 100 unit/mL (3 mL) InPn pen INJECT 40 UNITS UNDER THE SKIN TWICE A DAY    lisinopril (PRINIVIL,ZESTRIL) 40 MG tablet TAKE 1 TABLET DAILY    pravastatin (PRAVACHOL) 40 MG tablet TAKE 1 TABLET EVERY EVENING           Clinical Reference Information           Your Vitals Were     BP Pulse Height Weight BMI    107/58 70 5' 4" (1.626 m) 124.3 kg (274 lb) 47.03 kg/m2      Blood Pressure          Most Recent Value    BP  (!)  107/58      Allergies as " of 3/8/2017     Cyclobenzaprine    Erythromycin    Keflex [Cephalexin]      Immunizations Administered on Date of Encounter - 3/8/2017     None      Instructions      Diabetes: Inspecting Your Feet    Diabetes increases your chances of developing foot problems. So inspect your feet every day. This helps you find small skin irritations before they become serious ulcers or infections. If you have trouble seeing the bottoms of your feet, use a mirror or ask a family member or friend to help.  How to check your feet  Below are tips to help you look for foot problems. Try to check your feet at the same time each day, such as when you get out of bed in the morning:  · Check the top of each foot. The tops of toes, back of the heel, and outer edge of the foot can get a lot of rubbing from poor-fitting shoes.  · Check the bottom of each foot. Daily wear and tear often leads to problems at pressure spots.  · Check the toes and nails. Fungal infections often occur between toes. Toenail problems can also be a sign of fungal infections or lead to breaks in the skin.  · Check your shoes, too. Loose objects inside a shoe can injure the foot. Use your hand to feel inside your shoes for things like marco, loose stitching, or rough areas that could irritate your skin.  Warning signs  Look for any color changes in the foot. Redness with streaks can signal a severe infection, which needs immediate medical attention. Tell your healthcare provider right away if you have any of these problems:  · Swelling, sometimes with color changes, may be a sign of poor blood flow or infection. Symptoms include tenderness and an increase in the size of your foot.  · Warm or hot areas on your feet may be signs of infection. A foot that is cold may not be getting enough blood.  · Sensations such as burning, tingling, or pins and needles can be signs of a problem. Also check for areas that may be numb.  · Hot spots are caused by friction or pressure.  Look for hot spots in areas that get a lot of rubbing. Hot spots can turn into blisters, calluses, or sores.  · Cracks and sores are caused by dry or irritated skin. They are a sign that the skin is breaking down, which can lead to infection.  · Toenail problems to watch for include nails growing into the skin (ingrown toenail) and causing redness or pain. Thick, yellow, or discolored nails can signal a fungal infection.  · Drainage and odor can develop from untreated sores and ulcers. Call your healthcare provider right away if you notice white or yellow drainage, bleeding, or unpleasant odor.   Date Last Reviewed: 6/1/2016  © 6885-6157 [employer]. [address] [place] [address], [place] [address]. All rights reserved. This information is not intended as a substitute for professional medical care. Always follow your healthcare professional's instructions.             Smoking Cessation     If you would like to quit smoking:   You may be eligible for free services if you are a [place] resident and started smoking cigarettes before September 1, 1988.  Call the Smoking Cessation Trust (SCT) toll free at (969) 496-8047 or (127) 627-3019.   Call 7-909-QUIT-NOW if you do not meet the above criteria.            Language Assistance Services     ATTENTION: Language assistance services are available, free of charge. Please call [phone].      ATENCIÓN: Si habla español, tiene a dominguez disposición servicios gratuitos de asistencia lingüística. Llame al [phone].     CHÚ Ý: N?u b?n nói Ti?ng Vi?t, có các d?ch v? h? tr? ngôn ng? mi?n phí dành cho b?n. G?i s? [phone].         [facility] complies with applicable Federal civil rights laws and does not discriminate on the basis of race, color, national origin, age, disability, or sex.

## 2017-03-08 NOTE — PROGRESS NOTES
Subjective:    Patient ID: Kylie King is a 61 y.o. female.    Chief Complaint: Diabetes Mellitus      HPI:     Pt presents for DM foot exam  She is s/p L 3rd toe distal phalanx amputation for osteomyelitis last november  Pt reports painfull nails  Pt has no other complaints  This patient has documented high risk feet requiring routine maintenance secondary to diabetes mellitis and those secondary complications of diabetes, as mentioned.    PCP: Virginia Foote MD    Date Last Seen by PCP: inepic  Current shoe gear:  Affected Foot: Extra depth shoes with custome accommodative insoles       Hemoglobin A1C   Date Value Ref Range Status   05/17/2016 11.4 (H) 4.5 - 6.2 % Final   12/29/2015 12.4 (H) 4.5 - 6.2 % Final   09/16/2015 11.6 (H) 4.5 - 6.2 % Final         Past Medical History:   Diagnosis Date    Anxiety     Breast cancer 1/2013    left breast- invasive mammary carcinoma, ER/[place] positive, Her2 negative    Choledocholithiasis     s/p ERCP and stent placement    Colon cancer 2001    CRI (chronic renal insufficiency)     one kidney    GERD (gastroesophageal reflux disease)     History of acute pancreatitis 2009 2/09 s/p sphincterotomy    History of colon cancer     s/p sigmoid colectomy    HTN (hypertension), benign     Hyperlipidemia     Intracerebral hemorrhage     Kidney stone     left    Obesity     Pancreatitis     Pancreatitis 2008    Stroke 12/2012    Tobacco abuse     Type 2 diabetes mellitus with neurological manifestations, controlled      Past Surgical History:   Procedure Laterality Date    BREAST BIOPSY  1/2012    left breast invasive mammary carcinoma (lateral bx) and intraductal papilloma (medial bx)    BREAST LUMPECTOMY  1999    right breast- benign fibroadenoma    BREAST RECONSTRUCTION  2013    CHOLECYSTECTOMY  2001    COLON SURGERY      HERNIA REPAIR      left nephrectomy      atrophic kidney and hydronephrosis    left oopherectomy  2001    MASTECTOMY  2013     "left    NEPHRECTOMY  2011    lap    SIGMOIDECTOMY  2001     Social History     Social History    Marital status:      Spouse name: N/A    Number of children: 2    Years of education: N/A     Occupational History    not working [employer]     Social History Main Topics    Smoking status: Current Every Day Smoker     Packs/day: 0.50     Types: Cigarettes    Smokeless tobacco: Never Used      Comment: anxious    Alcohol use No    Drug use: No    Sexual activity: No     Other Topics Concern    Not on file     Social History Narrative    She is not exercising regularly         Current Outpatient Prescriptions:     ACETAMINOPHEN WITH CODEINE (TYLENOL-CODEINE #3 ORAL), Take by mouth daily as needed., Disp: , Rfl:     amlodipine (NORVASC) 10 MG tablet, TAKE 1 TABLET EVERY EVENING, Disp: 90 tablet, Rfl: 3    BD INSULIN PEN NEEDLE UF MINI 31 gauge x 3/16" Ndle, USE 5 NEEDLES PER DAY, Disp: 450 each, Rfl: 2    blood sugar diagnostic (ONETOUCH ULTRA TEST) Strp, 1 strip by Misc.(Non-Drug; Combo Route) route 4 (four) times daily before meals and nightly., Disp: 400 strip, Rfl: 4    carvedilol (COREG) 25 MG tablet, Take 1 tablet (25 mg total) by mouth 2 (two) times daily., Disp: 180 tablet, Rfl: 4    citalopram (CELEXA) 20 MG tablet, TAKE 1 TABLET NIGHTLY, Disp: 90 tablet, Rfl: 3    doxycycline (VIBRA-TABS) 100 MG tablet, Take 1 tablet (100 mg total) by mouth 2 (two) times daily. Remain upright for 60 minutes after each dose., Disp: 28 tablet, Rfl: 3    furosemide (LASIX) 40 MG tablet, Take 1 tablet (40 mg total) by mouth once daily., Disp: 90 tablet, Rfl: 4    gabapentin (NEURONTIN) 400 MG capsule, Take 1 capsule (400 mg total) by mouth 2 (two) times daily., Disp: 180 capsule, Rfl: 0    HUMALOG KWIKPEN 100 unit/mL InPn pen, BLOOD GLUCOSE 150-200, INJECT 15 UNITS UNDER THE SKIN, 201-250, 18 UNITS, 251-300, 20 UNITS THREE TIMES A DAY AS DIRECTED, Disp: 45 mL, Rfl: 3    hydrALAZINE (APRESOLINE) 50 " "MG tablet, TAKE 1 TABLET EVERY 8 HOURS, Disp: 270 tablet, Rfl: 3    insulin needles, disposable, (PEN NEEDLE, DIABETIC) 31 Ndle, Patient needs 5 needles a day, Disp: 500 each, Rfl: 3    insulin needles, disposable, 32 x 5/32 " Ndle, 1 Device by Misc.(Non-Drug; Combo Route) route 5 (five) times daily., Disp: 400 each, Rfl: 6    lancets (ONETOUCH DELICA LANCETS) 33 gauge Misc, 1 lancet by Misc.(Non-Drug; Combo Route) route 4 (four) times daily before meals and nightly., Disp: 400 each, Rfl: 4    letrozole (FEMARA) 2.5 mg Tab, Take 1 tablet (2.5 mg total) by mouth every evening., Disp: 90 tablet, Rfl: 3    LEVEMIR FLEXTOUCH 100 unit/mL (3 mL) InPn pen, INJECT 40 UNITS UNDER THE SKIN TWICE A DAY, Disp: 75 mL, Rfl: 3    lisinopril (PRINIVIL,ZESTRIL) 40 MG tablet, TAKE 1 TABLET DAILY, Disp: 90 tablet, Rfl: 3    pravastatin (PRAVACHOL) 40 MG tablet, TAKE 1 TABLET EVERY EVENING, Disp: 90 tablet, Rfl: 3  Review of patient's allergies indicates:   Allergen Reactions    Cyclobenzaprine Other (See Comments)     Patient stated causes hallucinations.     Erythromycin      Other reaction(s): Stomach upset    Keflex [cephalexin]      Yeast infection       BP (!) 107/58  Pulse 70  Ht 5' 4" (1.626 m)  Wt 124.3 kg (274 lb)  BMI 47.03 kg/m2    ROS    Vascular: positive for edema  Musculoskeletal: positive for joint pain, joint edema  Skin: positive for lesions  Neurological: positive for burning, tingling, numbness  Gastrointestinal: negative for stomach pain, nausea and vomiting    ROS Constitutional:  General Appearance: well nourished        Objective:        Ortho/SPM Exam  Physical Exam    V: DP 1/4, PT 1/4, CRT< 3s to all digits tested.     N: Sensation diminished to all distributions    Derm: skin intact. + hyperkeratotic lesion at tip of toes 1,4,5 b/l. Nails mycotic and dystrophic x 9    Ortho:  +Motor EHL/FHL/TA/GA   L 3rd toe amp at dipj noted -  Well healed   Compartments soft/compressible. No pain on passive " "stretch of big toe. No calf  pain.          Assessment:     Imagin. Type II diabetes mellitus with neurological manifestations    2. Onychomycosis due to dermatophyte    3. Corn or callus    4. Status post amputation of lesser toe of left foot          Plan:       Orders Placed This Encounter    Foot Care     .   Kylie was seen today for diabetes mellitus.    Diagnoses and all orders for this visit:    Type II diabetes mellitus with neurological manifestations  -     Foot Care    Onychomycosis due to dermatophyte    Corn or callus    Status post amputation of lesser toe of left foot    Routine Foot Care  Date/Time: 3/8/2017 1:20 PM  Performed by: СВЕТЛАНА TURPIN JR.  Authorized by: СВЕТЛАНА TURPIN JR.     Time out: Immediately prior to procedure a "time out" was called to verify the correct patient, procedure, equipment, support staff and site/side marked as required.    Consent Done?:  Yes (Verbal)  Hyperkeratotic Skin Lesions?: Yes    Number of trimmed lesions:  6  Location(s):  Left 1st Toe, Right 1st Toe, Left 4th Toe, Right 4th Toe, Left 5th Toe and Right 5th Toe    Nail Care Type:  Debride  Patient tolerance:  Patient tolerated the procedure well with no immediate complications      Kylie King is a 61 y.o. female presenting w/   1. Type II diabetes mellitus with neurological manifestations    2. Onychomycosis due to dermatophyte    3. Corn or callus    4. Status post amputation of lesser toe of left foot      ADA Risk Classification: Hx of ulcer or amputation - 3: rtc 1-2 months    -pt seen, evaluated, and managed  -dx discussed in detail. All questions/concerns addressed  -xr and jerad at nxt visit  -procedures as above  -educated on DM footcare    Return in about 2 months (around 2017).            "

## 2017-03-08 NOTE — PATIENT INSTRUCTIONS
Diabetes: Inspecting Your Feet    Diabetes increases your chances of developing foot problems. So inspect your feet every day. This helps you find small skin irritations before they become serious ulcers or infections. If you have trouble seeing the bottoms of your feet, use a mirror or ask a family member or friend to help.  How to check your feet  Below are tips to help you look for foot problems. Try to check your feet at the same time each day, such as when you get out of bed in the morning:  · Check the top of each foot. The tops of toes, back of the heel, and outer edge of the foot can get a lot of rubbing from poor-fitting shoes.  · Check the bottom of each foot. Daily wear and tear often leads to problems at pressure spots.  · Check the toes and nails. Fungal infections often occur between toes. Toenail problems can also be a sign of fungal infections or lead to breaks in the skin.  · Check your shoes, too. Loose objects inside a shoe can injure the foot. Use your hand to feel inside your shoes for things like marco, loose stitching, or rough areas that could irritate your skin.  Warning signs  Look for any color changes in the foot. Redness with streaks can signal a severe infection, which needs immediate medical attention. Tell your healthcare provider right away if you have any of these problems:  · Swelling, sometimes with color changes, may be a sign of poor blood flow or infection. Symptoms include tenderness and an increase in the size of your foot.  · Warm or hot areas on your feet may be signs of infection. A foot that is cold may not be getting enough blood.  · Sensations such as burning, tingling, or pins and needles can be signs of a problem. Also check for areas that may be numb.  · Hot spots are caused by friction or pressure. Look for hot spots in areas that get a lot of rubbing. Hot spots can turn into blisters, calluses, or sores.  · Cracks and sores are caused by dry or irritated  skin. They are a sign that the skin is breaking down, which can lead to infection.  · Toenail problems to watch for include nails growing into the skin (ingrown toenail) and causing redness or pain. Thick, yellow, or discolored nails can signal a fungal infection.  · Drainage and odor can develop from untreated sores and ulcers. Call your healthcare provider right away if you notice white or yellow drainage, bleeding, or unpleasant odor.   Date Last Reviewed: 6/1/2016 © 2000-2016 [employer]. [address] [place] [address]. All rights reserved. This information is not intended as a substitute for professional medical care. Always follow your healthcare professional's instructions.

## 2017-03-17 ENCOUNTER — OFFICE VISIT (OUTPATIENT)
Dept: INTERNAL MEDICINE | Facility: CLINIC | Age: 62
End: 2017-03-17
Payer: COMMERCIAL

## 2017-03-17 ENCOUNTER — HOSPITAL ENCOUNTER (OUTPATIENT)
Dept: RADIOLOGY | Facility: OTHER | Age: 62
Discharge: HOME | End: 2017-03-17
Attending: INTERNAL MEDICINE
Payer: COMMERCIAL

## 2017-03-17 VITALS
WEIGHT: 277.13 LBS | HEART RATE: 72 BPM | BODY MASS INDEX: 47.31 KG/M2 | HEIGHT: 64 IN | SYSTOLIC BLOOD PRESSURE: 126 MMHG | RESPIRATION RATE: 16 BRPM | TEMPERATURE: 98 F | DIASTOLIC BLOOD PRESSURE: 60 MMHG

## 2017-03-17 DIAGNOSIS — I10 ESSENTIAL HYPERTENSION: Primary | ICD-10-CM

## 2017-03-17 DIAGNOSIS — R19.00 ABDOMINAL MASS, UNSPECIFIED LOCATION: ICD-10-CM

## 2017-03-17 DIAGNOSIS — E11.42 DIABETIC PERIPHERAL NEUROPATHY ASSOCIATED WITH TYPE 2 DIABETES MELLITUS: ICD-10-CM

## 2017-03-17 DIAGNOSIS — E78.5 HYPERLIPIDEMIA, UNSPECIFIED HYPERLIPIDEMIA TYPE: ICD-10-CM

## 2017-03-17 DIAGNOSIS — R35.89 POLYURIA: ICD-10-CM

## 2017-03-17 DIAGNOSIS — R15.9 INCONTINENCE OF FECES, UNSPECIFIED FECAL INCONTINENCE TYPE: ICD-10-CM

## 2017-03-17 LAB
BACTERIA #/AREA URNS AUTO: ABNORMAL /HPF
BILIRUB UR QL STRIP: NEGATIVE
CLARITY UR REFRACT.AUTO: ABNORMAL
COLOR UR AUTO: YELLOW
GLUCOSE UR QL STRIP: NEGATIVE
HGB UR QL STRIP: NEGATIVE
HYALINE CASTS UR QL AUTO: 1 /LPF
KETONES UR QL STRIP: NEGATIVE
LEUKOCYTE ESTERASE UR QL STRIP: NEGATIVE
MICROSCOPIC COMMENT: ABNORMAL
NITRITE UR QL STRIP: NEGATIVE
NON-SQ EPI CELLS #/AREA URNS AUTO: 0 /HPF
PH UR STRIP: 5 [PH] (ref 5–8)
PROT UR QL STRIP: ABNORMAL
RBC #/AREA URNS AUTO: 1 /HPF (ref 0–4)
SP GR UR STRIP: 1.01 (ref 1–1.03)
SQUAMOUS #/AREA URNS AUTO: 2 /HPF
URN SPEC COLLECT METH UR: ABNORMAL
UROBILINOGEN UR STRIP-ACNC: NEGATIVE EU/DL
WBC #/AREA URNS AUTO: 5 /HPF (ref 0–5)

## 2017-03-17 PROCEDURE — 1160F RVW MEDS BY RX/DR IN RCRD: CPT | Mod: S$GLB,,, | Performed by: INTERNAL MEDICINE

## 2017-03-17 PROCEDURE — 3046F HEMOGLOBIN A1C LEVEL >9.0%: CPT | Mod: S$GLB,,, | Performed by: INTERNAL MEDICINE

## 2017-03-17 PROCEDURE — 81001 URINALYSIS AUTO W/SCOPE: CPT

## 2017-03-17 PROCEDURE — 87186 SC STD MICRODIL/AGAR DIL: CPT

## 2017-03-17 PROCEDURE — 87086 URINE CULTURE/COLONY COUNT: CPT

## 2017-03-17 PROCEDURE — 87088 URINE BACTERIA CULTURE: CPT

## 2017-03-17 PROCEDURE — 99999 PR PBB SHADOW E&M-EST. PATIENT-LVL IV: CPT | Mod: PBBFAC,,, | Performed by: INTERNAL MEDICINE

## 2017-03-17 PROCEDURE — 3078F DIAST BP <80 MM HG: CPT | Mod: S$GLB,,, | Performed by: INTERNAL MEDICINE

## 2017-03-17 PROCEDURE — 4010F ACE/ARB THERAPY RXD/TAKEN: CPT | Mod: S$GLB,,, | Performed by: INTERNAL MEDICINE

## 2017-03-17 PROCEDURE — 99214 OFFICE O/P EST MOD 30 MIN: CPT | Mod: S$GLB,,, | Performed by: INTERNAL MEDICINE

## 2017-03-17 PROCEDURE — 2022F DILAT RTA XM EVC RTNOPTHY: CPT | Mod: S$GLB,,, | Performed by: INTERNAL MEDICINE

## 2017-03-17 PROCEDURE — 76700 US EXAM ABDOM COMPLETE: CPT | Mod: TC

## 2017-03-17 PROCEDURE — 76700 US EXAM ABDOM COMPLETE: CPT | Mod: 26,,, | Performed by: INTERNAL MEDICINE

## 2017-03-17 PROCEDURE — 87077 CULTURE AEROBIC IDENTIFY: CPT

## 2017-03-17 PROCEDURE — 3074F SYST BP LT 130 MM HG: CPT | Mod: S$GLB,,, | Performed by: INTERNAL MEDICINE

## 2017-03-17 RX ORDER — ACETAMINOPHEN AND CODEINE PHOSPHATE 300; 30 MG/1; MG/1
1 TABLET ORAL EVERY 6 HOURS PRN
Qty: 90 TABLET | Refills: 2 | Status: SHIPPED | OUTPATIENT
Start: 2017-03-17 | End: 2017-08-30 | Stop reason: SDUPTHER

## 2017-03-17 NOTE — MR AVS SNAPSHOT
[facility]   [facility]  [place] [address]  Phone: [phone]  Fax: [phone]                  Kylie King   3/17/2017 2:00 PM   Office Visit    Description:  Female : 1955   Provider:  Virginia Foote MD   Department:  [facility]           Reason for Visit     Urinary Tract Infection     Back Pain     R knee pain     Fall           Diagnoses this Visit        Comments    Essential hypertension    -  Primary     Uncontrolled type 2 diabetes mellitus with stage 4 chronic kidney disease, with long-term current use of insulin         Diabetic peripheral neuropathy associated with type 2 diabetes mellitus         Hyperlipidemia, unspecified hyperlipidemia type         Polyuria         Incontinence of feces, unspecified fecal incontinence type                To Do List           Future Appointments        Provider Department Dept Phone    3/22/2017 9:30 AM MD Elfego Currie [facility] - Infectious Diseases [phone]    3/22/2017 11:00 AM AIDEN Handley [facility] - Orthopedics [phone]    3/27/2017 9:40 AM Liborio Kohler MD [facility] OB/ -[phone]    2017 10:45 AM Jose Delacruz Jr., DPM [facility] Pod[facility] [phone]      Goals (5 Years of Data)     None      Follow-Up and Disposition     Return in about 3 months (around 2017).       These Medications        Disp Refills Start End    acetaminophen-codeine 300-30mg (TYLENOL-CODEINE #3) 300-30 mg Tab 90 tablet 2 3/17/2017     Take 1 tablet by mouth every 6 (six) hours as needed. - Oral    Pharmacy: [facility] - [place], [place] - [address] [place] [address] Ph #: [phone]         [facility] On Call     [facility] On Call Nurse Care Line -  Assistance  Registered nurses in the [facility] On Call Center provide clinical advisement, health education, appointment booking, and other advisory services.  Call for this free service at  "[phone].             Medications           Message regarding Medications     Verify the changes and/or additions to your medication regime listed below are the same as discussed with your clinician today.  If any of these changes or additions are incorrect, please notify your healthcare provider.        CHANGE how you are taking these medications     Start Taking Instead of    acetaminophen-codeine 300-30mg (TYLENOL-CODEINE #3) 300-30 mg Tab ACETAMINOPHEN WITH CODEINE (TYLENOL-CODEINE #3 ORAL)    Dosage:  Take 1 tablet by mouth every 6 (six) hours as needed. Dosage:  Take by mouth daily as needed.    Reason for Change:  Reorder            Verify that the below list of medications is an accurate representation of the medications you are currently taking.  If none reported, the list may be blank. If incorrect, please contact your healthcare provider. Carry this list with you in case of emergency.           Current Medications     acetaminophen-codeine 300-30mg (TYLENOL-CODEINE #3) 300-30 mg Tab Take 1 tablet by mouth every 6 (six) hours as needed.    amlodipine (NORVASC) 10 MG tablet TAKE 1 TABLET EVERY EVENING    BD INSULIN PEN NEEDLE UF MINI 31 gauge x 3/16" Ndle USE 5 NEEDLES PER DAY    blood sugar diagnostic (ONETOUCH ULTRA TEST) Strp 1 strip by Misc.(Non-Drug; Combo Route) route 4 (four) times daily before meals and nightly.    carvedilol (COREG) 25 MG tablet Take 1 tablet (25 mg total) by mouth 2 (two) times daily.    citalopram (CELEXA) 20 MG tablet TAKE 1 TABLET NIGHTLY    doxycycline (VIBRA-TABS) 100 MG tablet Take 1 tablet (100 mg total) by mouth 2 (two) times daily. Remain upright for 60 minutes after each dose.    furosemide (LASIX) 40 MG tablet Take 1 tablet (40 mg total) by mouth once daily.    gabapentin (NEURONTIN) 400 MG capsule Take 1 capsule (400 mg total) by mouth 2 (two) times daily.    HUMALOG KWIKPEN 100 unit/mL InPn pen BLOOD GLUCOSE 150-200, INJECT 15 UNITS UNDER THE SKIN, 201-250, 18 " "UNITS, 251-300, 20 UNITS THREE TIMES A DAY AS DIRECTED    hydrALAZINE (APRESOLINE) 50 MG tablet TAKE 1 TABLET EVERY 8 HOURS    insulin needles, disposable, (PEN NEEDLE, DIABETIC) 31 Ndle Patient needs 5 needles a day    insulin needles, disposable, 32 x 5/32 " Ndle 1 Device by Misc.(Non-Drug; Combo Route) route 5 (five) times daily.    lancets (ONETOUCH DELICA LANCETS) 33 gauge Misc 1 lancet by Misc.(Non-Drug; Combo Route) route 4 (four) times daily before meals and nightly.    letrozole (FEMARA) 2.5 mg Tab Take 1 tablet (2.5 mg total) by mouth every evening.    LEVEMIR FLEXTOUCH 100 unit/mL (3 mL) InPn pen INJECT 40 UNITS UNDER THE SKIN TWICE A DAY    lisinopril (PRINIVIL,ZESTRIL) 40 MG tablet TAKE 1 TABLET DAILY    pravastatin (PRAVACHOL) 40 MG tablet TAKE 1 TABLET EVERY EVENING           Clinical Reference Information           Your Vitals Were     BP Pulse Temp Resp Height Weight    126/60 (BP Location: Left arm, Patient Position: Sitting, BP Method: Manual) 72 98.2 °F (36.8 °C) (Oral) 16 5' 4" (1.626 m) 125.7 kg (277 lb 1.9 oz)    BMI                47.57 kg/m2          Blood Pressure          Most Recent Value    BP  126/60      Allergies as of 3/17/2017     Cyclobenzaprine    Erythromycin    Keflex [Cephalexin]      Immunizations Administered on Date of Encounter - 3/17/2017     None      Orders Placed During Today's Visit      Normal Orders This Visit    Ambulatory consult to Colorectal Surgery     Urinalysis     Urine culture     Future Labs/Procedures Expected by Expires    Comprehensive metabolic panel  3/17/2017 5/16/2018    Hemoglobin A1c  3/17/2017 3/17/2018    TSH  3/17/2017 5/16/2018      Smoking Cessation     If you would like to quit smoking:   You may be eligible for free services if you are a [place] resident and started smoking cigarettes before September 1, 1988.  Call the Smoking Cessation [employer] (SCT) toll free at (403) 802-3717 or (947) 634-2376.   Call 9-800-QUIT-NOW if you do not meet " the above criteria.            Language Assistance Services     ATTENTION: Language assistance services are available, free of charge. Please call [phone].      ATENCIÓN: Si habla simeon, tiene a dominguez disposición servicios gratuitos de asistencia lingüística. Llame al [phone].     CHÚ Ý: N?u b?n nói Ti?ng Vi?t, có các d?ch v? h? tr? ngôn ng? mi?n phí dành cho b?n. G?i s? [phone].         [facility] complies with applicable Federal civil rights laws and does not discriminate on the basis of race, color, national origin, age, disability, or sex.

## 2017-03-17 NOTE — PROGRESS NOTES
CC: followup of hypertension  HPI:  The patient is a 61 y.o. year old female who presents to the office for followup of hypertension.  The patient denies any chest pain, shortness of breath, headache, excessive fatigue, nausea or vomiting.  She complains polyuria.  Her symptoms improved temporarily while on cipro.  She complains of postnasal drip and blurred vision.  She states her blood sugars remains elevated, but improved overall.  The patient reports falling in the shower yesterday.  She states her pain has been worse lately.  She complains of pinprick sensation in his fingers.  The patient continues to experience fecal incontin Review of abdominal ultrasound reveals no acute abnormalities.  ence.    PAST MEDICAL HISTORY:  Past Medical History:   Diagnosis Date    Anxiety     Breast cancer 1/2013    left breast- invasive mammary carcinoma, ER/[place] positive, Her2 negative    Choledocholithiasis     s/p ERCP and stent placement    Colon cancer 2001    CRI (chronic renal insufficiency)     one kidney    GERD (gastroesophageal reflux disease)     History of acute pancreatitis 2009 2/09 s/p sphincterotomy    History of colon cancer     s/p sigmoid colectomy    HTN (hypertension), benign     Hyperlipidemia     Intracerebral hemorrhage     Kidney stone     left    Obesity     Pancreatitis     Pancreatitis 2008    Stroke 12/2012    Tobacco abuse     Type 2 diabetes mellitus with neurological manifestations, controlled        SURGICAL HISTORY:  Past Surgical History:   Procedure Laterality Date    BREAST BIOPSY  1/2012    left breast invasive mammary carcinoma (lateral bx) and intraductal papilloma (medial bx)    BREAST LUMPECTOMY  1999    right breast- benign fibroadenoma    BREAST RECONSTRUCTION  2013    CHOLECYSTECTOMY  2001    COLON SURGERY      HERNIA REPAIR      left nephrectomy      atrophic kidney and hydronephrosis    left oopherectomy  2001    MASTECTOMY  2013    left    NEPHRECTOMY   2011    lap    SIGMOIDECTOMY  2001       MEDS:  Medcard reviewed and updated    ALLERGIES: Allergy Card reviewed and updated    SOCIAL HISTORY:   The patient is a nonsmoker.    PE:   APPEARANCE: Well nourished, well developed, in no acute distress.    CHEST: Lungs clear to auscultation with unlabored respirations.  CARDIOVASCULAR: Normal S1, S2. No murmurs. No carotid bruits. No pedal edema.  ABDOMEN: Bowel sounds normal. Not distended. Soft. No tenderness or masses.   PSYCHIATRIC: The patient is oriented to person, place, and time and has a pleasant affect.        ASSESSMENT/PLAN:  Kylie was seen today for urinary tract infection, back pain, r knee pain and fall.    Diagnoses and all orders for this visit:    Essential hypertension  -     Comprehensive metabolic panel; Future  -     Hemoglobin A1c; Future  -     TSH; Future    Uncontrolled type 2 diabetes mellitus with stage 4 chronic kidney disease, with long-term current use of insulin  -     Hemoglobin A1c; Future    Diabetic peripheral neuropathy associated with type 2 diabetes mellitus  -     Hemoglobin A1c; Future    Hyperlipidemia, unspecified hyperlipidemia type  -     Continue pravastatin    Polyuria  -     Urinalysis  -     Urine culture    Incontinence of feces, unspecified fecal incontinence type  -     Ambulatory consult to Colorectal Surgery    Other orders  -     acetaminophen-codeine 300-30mg (TYLENOL-CODEINE #3) 300-30 mg Tab; Take 1 tablet by mouth every 6 (six) hours as needed.  -     Urinalysis Microscopic

## 2017-03-20 ENCOUNTER — TELEPHONE (OUTPATIENT)
Dept: INTERNAL MEDICINE | Facility: CLINIC | Age: 62
End: 2017-03-20

## 2017-03-20 LAB — BACTERIA UR CULT: NORMAL

## 2017-03-20 RX ORDER — CIPROFLOXACIN 250 MG/1
250 TABLET, FILM COATED ORAL EVERY 12 HOURS
Qty: 14 TABLET | Refills: 0 | Status: SHIPPED | OUTPATIENT
Start: 2017-03-20 | End: 2017-03-22 | Stop reason: SDUPTHER

## 2017-03-20 NOTE — TELEPHONE ENCOUNTER
Please informed patient that urine cultures positive for UTI.  I [facility].  Also, hemoglobin A1c has improved slightly to 10.4.  It is down 1 point from previous lab.  Creatinine is elevated compared to previous lab.  This may be due in part to urinary tract infection.  Please take antibiotics as prescribed.

## 2017-03-20 NOTE — TELEPHONE ENCOUNTER
----- Message from Christiana Culver sent at 3/20/2017 11:14 AM CDT -----  Contact: Self/[phone]  Patient would like to get test results.  Name of test (lab, mammo, etc.):  NON FASTING LAB [[id number]  Date of test:  3/17  Ordering provider: Virginia Foote  Where was the test performed:  [facility]  Comments:

## 2017-03-22 ENCOUNTER — OFFICE VISIT (OUTPATIENT)
Dept: UROGYNECOLOGY | Facility: CLINIC | Age: 62
End: 2017-03-22
Attending: OBSTETRICS & GYNECOLOGY
Payer: COMMERCIAL

## 2017-03-22 ENCOUNTER — TELEPHONE (OUTPATIENT)
Dept: UROGYNECOLOGY | Facility: CLINIC | Age: 62
End: 2017-03-22

## 2017-03-22 ENCOUNTER — RESEARCH ENCOUNTER (OUTPATIENT)
Dept: UROGYNECOLOGY | Facility: CLINIC | Age: 62
End: 2017-03-22

## 2017-03-22 ENCOUNTER — HOSPITAL ENCOUNTER (OUTPATIENT)
Dept: RADIOLOGY | Facility: HOSPITAL | Age: 62
Discharge: HOME | End: 2017-03-22
Attending: ORTHOPAEDIC SURGERY
Payer: COMMERCIAL

## 2017-03-22 ENCOUNTER — OFFICE VISIT (OUTPATIENT)
Dept: INFECTIOUS DISEASES | Facility: CLINIC | Age: 62
End: 2017-03-22
Payer: COMMERCIAL

## 2017-03-22 ENCOUNTER — TELEPHONE (OUTPATIENT)
Dept: INTERNAL MEDICINE | Facility: CLINIC | Age: 62
End: 2017-03-22

## 2017-03-22 ENCOUNTER — OFFICE VISIT (OUTPATIENT)
Dept: ORTHOPEDICS | Facility: CLINIC | Age: 62
End: 2017-03-22
Payer: COMMERCIAL

## 2017-03-22 VITALS
BODY MASS INDEX: 47.12 KG/M2 | DIASTOLIC BLOOD PRESSURE: 89 MMHG | WEIGHT: 276 LBS | HEART RATE: 73 BPM | SYSTOLIC BLOOD PRESSURE: 187 MMHG | HEIGHT: 64 IN | TEMPERATURE: 98 F

## 2017-03-22 VITALS
WEIGHT: 276 LBS | HEIGHT: 64 IN | SYSTOLIC BLOOD PRESSURE: 132 MMHG | BODY MASS INDEX: 47.12 KG/M2 | DIASTOLIC BLOOD PRESSURE: 70 MMHG

## 2017-03-22 VITALS
HEIGHT: 64 IN | SYSTOLIC BLOOD PRESSURE: 173 MMHG | HEART RATE: 72 BPM | BODY MASS INDEX: 47.12 KG/M2 | WEIGHT: 276 LBS | DIASTOLIC BLOOD PRESSURE: 85 MMHG | RESPIRATION RATE: 16 BRPM

## 2017-03-22 DIAGNOSIS — R32 INCONTINENCE OF URINE IN FEMALE: ICD-10-CM

## 2017-03-22 DIAGNOSIS — M25.561 ACUTE PAIN OF RIGHT KNEE: ICD-10-CM

## 2017-03-22 DIAGNOSIS — M25.511 ACUTE PAIN OF RIGHT SHOULDER: ICD-10-CM

## 2017-03-22 DIAGNOSIS — M25.521 RIGHT ELBOW PAIN: ICD-10-CM

## 2017-03-22 DIAGNOSIS — E11.42 DIABETIC PERIPHERAL NEUROPATHY ASSOCIATED WITH TYPE 2 DIABETES MELLITUS: ICD-10-CM

## 2017-03-22 DIAGNOSIS — R35.1 NOCTURIA: ICD-10-CM

## 2017-03-22 DIAGNOSIS — N95.2 ATROPHIC VAGINITIS: ICD-10-CM

## 2017-03-22 DIAGNOSIS — R29.6 FREQUENT FALLS: ICD-10-CM

## 2017-03-22 DIAGNOSIS — M25.561 ACUTE PAIN OF RIGHT KNEE: Primary | ICD-10-CM

## 2017-03-22 DIAGNOSIS — E66.01 MORBID OBESITY WITH BMI OF 45.0-49.9, ADULT: ICD-10-CM

## 2017-03-22 DIAGNOSIS — N18.30 CKD (CHRONIC KIDNEY DISEASE) STAGE 3, GFR 30-59 ML/MIN: Primary | ICD-10-CM

## 2017-03-22 DIAGNOSIS — R15.9 FULL INCONTINENCE OF FECES: ICD-10-CM

## 2017-03-22 DIAGNOSIS — N39.46 MIXED URGE AND STRESS INCONTINENCE: Primary | ICD-10-CM

## 2017-03-22 DIAGNOSIS — N18.30 CKD (CHRONIC KIDNEY DISEASE) STAGE 3, GFR 30-59 ML/MIN: ICD-10-CM

## 2017-03-22 LAB
BILIRUB SERPL-MCNC: ABNORMAL MG/DL
BLOOD URINE, POC: ABNORMAL
COLOR, POC UA: ABNORMAL
GLUCOSE UR QL STRIP: 250
KETONES UR QL STRIP: ABNORMAL
LEUKOCYTE ESTERASE URINE, POC: ABNORMAL
NITRITE, POC UA: ABNORMAL
PH, POC UA: 5
PROTEIN, POC: 500
SPECIFIC GRAVITY, POC UA: 1.01
UROBILINOGEN, POC UA: ABNORMAL

## 2017-03-22 PROCEDURE — 96372 THER/PROPH/DIAG INJ SC/IM: CPT | Mod: S$GLB,,, | Performed by: INTERNAL MEDICINE

## 2017-03-22 PROCEDURE — 73030 X-RAY EXAM OF SHOULDER: CPT | Mod: TC,RT

## 2017-03-22 PROCEDURE — 51701 INSERT BLADDER CATHETER: CPT | Mod: S$GLB,,, | Performed by: OBSTETRICS & GYNECOLOGY

## 2017-03-22 PROCEDURE — 99204 OFFICE O/P NEW MOD 45 MIN: CPT | Mod: S$GLB,,, | Performed by: PHYSICIAN ASSISTANT

## 2017-03-22 PROCEDURE — 3078F DIAST BP <80 MM HG: CPT | Mod: S$GLB,,, | Performed by: OBSTETRICS & GYNECOLOGY

## 2017-03-22 PROCEDURE — 3074F SYST BP LT 130 MM HG: CPT | Mod: S$GLB,,, | Performed by: PHYSICIAN ASSISTANT

## 2017-03-22 PROCEDURE — 73560 X-RAY EXAM OF KNEE 1 OR 2: CPT | Mod: 26,59,LT, | Performed by: RADIOLOGY

## 2017-03-22 PROCEDURE — 1160F RVW MEDS BY RX/DR IN RCRD: CPT | Mod: S$GLB,,, | Performed by: PHYSICIAN ASSISTANT

## 2017-03-22 PROCEDURE — 3079F DIAST BP 80-89 MM HG: CPT | Mod: S$GLB,,, | Performed by: INTERNAL MEDICINE

## 2017-03-22 PROCEDURE — 81002 URINALYSIS NONAUTO W/O SCOPE: CPT | Mod: S$GLB,,, | Performed by: OBSTETRICS & GYNECOLOGY

## 2017-03-22 PROCEDURE — 73560 X-RAY EXAM OF KNEE 1 OR 2: CPT | Mod: TC,RT

## 2017-03-22 PROCEDURE — 99999 PR PBB SHADOW E&M-EST. PATIENT-LVL V: CPT | Mod: PBBFAC,,, | Performed by: PHYSICIAN ASSISTANT

## 2017-03-22 PROCEDURE — 3046F HEMOGLOBIN A1C LEVEL >9.0%: CPT | Mod: S$GLB,,, | Performed by: INTERNAL MEDICINE

## 2017-03-22 PROCEDURE — 4010F ACE/ARB THERAPY RXD/TAKEN: CPT | Mod: S$GLB,,, | Performed by: INTERNAL MEDICINE

## 2017-03-22 PROCEDURE — 99205 OFFICE O/P NEW HI 60 MIN: CPT | Mod: 25,S$GLB,, | Performed by: OBSTETRICS & GYNECOLOGY

## 2017-03-22 PROCEDURE — 1160F RVW MEDS BY RX/DR IN RCRD: CPT | Mod: S$GLB,,, | Performed by: INTERNAL MEDICINE

## 2017-03-22 PROCEDURE — 99999 PR PBB SHADOW E&M-EST. PATIENT-LVL IV: CPT | Mod: PBBFAC,,, | Performed by: OBSTETRICS & GYNECOLOGY

## 2017-03-22 PROCEDURE — 73030 X-RAY EXAM OF SHOULDER: CPT | Mod: 26,RT,, | Performed by: RADIOLOGY

## 2017-03-22 PROCEDURE — 2022F DILAT RTA XM EVC RTNOPTHY: CPT | Mod: S$GLB,,, | Performed by: INTERNAL MEDICINE

## 2017-03-22 PROCEDURE — 99999 PR PBB SHADOW E&M-EST. PATIENT-LVL III: CPT | Mod: PBBFAC,,, | Performed by: INTERNAL MEDICINE

## 2017-03-22 PROCEDURE — 3077F SYST BP >= 140 MM HG: CPT | Mod: S$GLB,,, | Performed by: INTERNAL MEDICINE

## 2017-03-22 PROCEDURE — 99214 OFFICE O/P EST MOD 30 MIN: CPT | Mod: 25,S$GLB,, | Performed by: INTERNAL MEDICINE

## 2017-03-22 PROCEDURE — 3079F DIAST BP 80-89 MM HG: CPT | Mod: S$GLB,,, | Performed by: PHYSICIAN ASSISTANT

## 2017-03-22 PROCEDURE — 3075F SYST BP GE 130 - 139MM HG: CPT | Mod: S$GLB,,, | Performed by: OBSTETRICS & GYNECOLOGY

## 2017-03-22 PROCEDURE — 1160F RVW MEDS BY RX/DR IN RCRD: CPT | Mod: S$GLB,,, | Performed by: OBSTETRICS & GYNECOLOGY

## 2017-03-22 PROCEDURE — 73562 X-RAY EXAM OF KNEE 3: CPT | Mod: 26,RT,, | Performed by: RADIOLOGY

## 2017-03-22 RX ORDER — CEFTRIAXONE 1 G/1
2 INJECTION, POWDER, FOR SOLUTION INTRAMUSCULAR; INTRAVENOUS ONCE
Status: DISCONTINUED | OUTPATIENT
Start: 2017-03-22 | End: 2018-04-20

## 2017-03-22 RX ORDER — LIDOCAINE HYDROCHLORIDE 10 MG/ML
1 INJECTION INFILTRATION; PERINEURAL ONCE
Status: DISCONTINUED | OUTPATIENT
Start: 2017-03-22 | End: 2018-04-24 | Stop reason: HOSPADM

## 2017-03-22 RX ORDER — OXYBUTYNIN CHLORIDE 10 MG/1
10 TABLET, EXTENDED RELEASE ORAL DAILY
Qty: 30 TABLET | Refills: 11 | Status: SHIPPED | OUTPATIENT
Start: 2017-03-22 | End: 2017-03-24 | Stop reason: SDUPTHER

## 2017-03-22 RX ORDER — CIPROFLOXACIN 250 MG/1
250 TABLET, FILM COATED ORAL EVERY 12 HOURS
Qty: 14 TABLET | Refills: 0 | Status: SHIPPED | OUTPATIENT
Start: 2017-03-22 | End: 2017-04-19

## 2017-03-22 NOTE — TELEPHONE ENCOUNTER
Returned the patients call again and there was no answer lmom for a return call to discuss her results.

## 2017-03-22 NOTE — PROGRESS NOTES
Subjective:       Patient ID: Kylie King is a 61 y.o. female.    Chief Complaint:  Consult; Urinary Incontinence; and Urinary Tract Infection (recurrent)      History of Present Illness: 61 Y O F  multiple medical problems with history of colon cancer and beast cancer in 2013 treated with mastectomy and radiation. She also had a left  Nephrectomy due to hydroureter initially noted after the colon resection and has a history of abdominal hernia repair with mesh placement.  She presents with for evaluation of urinary incontinence worsening since the abdominal hernia repair.     HPI       [facility]      Question    3/22/2017  2:21 PM CDT     General Urogynecology: Are you experiencing the following?       Dysuria (painful urination)  No     Nocturia:  waking up at night to empty your bladder   Yes     If you answered yes to the previous question, how many times does this happen per night?  1-2     Enuresis (urine loss during sleep)  Yes     Dribbling urine after you urinate  Yes     Hematuria (urine appears red)  No     Type of stream  Splayed     Urinary Incontinence (General): Are you experiencing the following?       Past consultation for incontinence: Have you ever seen someone for the evaluation of incontinence?  No     If you answered yes to the previous question, please select all the therapies you have tried.   N/a- I answered no to the previous question     Please note the effectiveness of the therapies.       Need to wear protection to keep clothes dry   Yes     If you answered yes to the previous question, please telly the protection you use.   Pads       Poise       Diaper       Pad and diaper     If you wear protection, how much wetness is typically on each pad?  Soaked     If you wear protection, how often do you have to change per day, if applicable?   5-6 ( 2 pads and 2 diapers every time)     Stress Symptoms: Are you experiencing the following?       Leakage of urine with cough, laugh  "and/or sneeze  Yes     If you answered yes to the previous question, what is the frequency in days, weeks and/or months?  Several times a day     Leakage of urine with sex  No     Leakage of urine with bending/ lifting  Yes     Leakage of urine with briskly walking or jogging  Yes     If you lose urine for any other reason not previously mentioned, please note it below, if applicable.   sitting; standing; bending: lifting;     Urge Symptoms: Are you experiencing the following?       Urgency ("got to go" feeling)  Yes     Urge: How frequently do you feel an urge to urinate (feeling like you "gotta go" to the bathroom and can't wait)  Several times a day     Do you experience a leakage of urine when you have a feeling of urgency?   Yes     Leakage of urine when unaware  Yes     Past use of anticholinergics (medications used to treat overactive bladder)  No     If you answered yes to the previous question, please telly the anticholinergics you have used:        Have you ever used Mirbetriq (aka Mirabegron)?   No     Prolapse Symptoms: Are you experiencing any of the following?        Falling out/ Bulging/ Heaviness in the vagina  No     Vaginal/ Abdominal Pain/ Pressure  No     Need to strain/ Push to void  No     Need to wait on the toilet before you void  No     Unusual position to urinate (using your hands to push back the vaginal bulge)  No     Sensation of incomplete emptying  Yes     Past use of pessary device  No     If you answered yes to the previous question, please list the devices you have used below.        Bowel Symptoms: Are you experiencing any of the following?       Constipation  No     Diarrhea   Yes     Hematochezia (bloody stool)  No     Incomplete evacuation of stool  Yes     Involuntary loss of formed stool  Yes     Fecal smearing/urgency  Yes     Involuntary loss of gas  Yes     Vaginal Symptoms: Are you experiencing any of the following?        Abnormal vaginal bleeding   No     Vaginal dryness  " Yes     Sexually active   No     Dyspareunia (painful intercourse)  No     Estrogen use   Yes   HPI reviewed and confirmed with patient       GYN & OB History  No LMP recorded. Patient is postmenopausal.   Date of Last Pap: No result found    OB History    Para Term  AB SAB TAB Ectopic Multiple Living   2               # Outcome Date GA Lbr Vance/2nd Weight Sex Delivery Anes PTL Lv   2             1                  Past Medical History:   Diagnosis Date    Anxiety     Breast cancer 2013    left breast- invasive mammary carcinoma, ER/MO positive, Her2 negative    Choledocholithiasis     s/p ERCP and stent placement    Colon cancer     CRI (chronic renal insufficiency)     one kidney    GERD (gastroesophageal reflux disease)     History of acute pancreatitis  s/p sphincterotomy    History of colon cancer     s/p sigmoid colectomy    HTN (hypertension), benign     Hyperlipidemia     Intracerebral hemorrhage     Kidney stone     left    Obesity     Pancreatitis     Pancreatitis 2008    Stroke 2012    Tobacco abuse     Type 2 diabetes mellitus with neurological manifestations, controlled      Past Surgical History:   Procedure Laterality Date    BREAST BIOPSY  2012    left breast invasive mammary carcinoma (lateral bx) and intraductal papilloma (medial bx)    BREAST LUMPECTOMY      right breast- benign fibroadenoma    BREAST RECONSTRUCTION      CHOLECYSTECTOMY      COLON SURGERY      HERNIA REPAIR      left nephrectomy      atrophic kidney and hydronephrosis    left oopherectomy  2001    MASTECTOMY  2013    left    NEPHRECTOMY  2011    lap    SIGMOIDECTOMY         Current Outpatient Prescriptions:     acetaminophen-codeine 300-30mg (TYLENOL-CODEINE #3) 300-30 mg Tab, Take 1 tablet by mouth every 6 (six) hours as needed., Disp: 90 tablet, Rfl: 2    amlodipine (NORVASC) 10 MG tablet, TAKE 1 TABLET EVERY EVENING, Disp: 90  "tablet, Rfl: 3    BD INSULIN PEN NEEDLE UF MINI 31 gauge x 3/16" Ndle, USE 5 NEEDLES PER DAY, Disp: 450 each, Rfl: 2    blood sugar diagnostic (ONETOUCH ULTRA TEST) Strp, 1 strip by Misc.(Non-Drug; Combo Route) route 4 (four) times daily before meals and nightly., Disp: 400 strip, Rfl: 4    carvedilol (COREG) 25 MG tablet, Take 1 tablet (25 mg total) by mouth 2 (two) times daily., Disp: 180 tablet, Rfl: 4    ciprofloxacin HCl (CIPRO) 250 MG tablet, Take 1 tablet (250 mg total) by mouth every 12 (twelve) hours., Disp: 14 tablet, Rfl: 0    citalopram (CELEXA) 20 MG tablet, TAKE 1 TABLET NIGHTLY, Disp: 90 tablet, Rfl: 3    furosemide (LASIX) 40 MG tablet, Take 1 tablet (40 mg total) by mouth once daily., Disp: 90 tablet, Rfl: 4    gabapentin (NEURONTIN) 400 MG capsule, Take 1 capsule (400 mg total) by mouth 2 (two) times daily., Disp: 180 capsule, Rfl: 0    HUMALOG KWIKPEN 100 unit/mL InPn pen, BLOOD GLUCOSE 150-200, INJECT 15 UNITS UNDER THE SKIN, 201-250, 18 UNITS, 251-300, 20 UNITS THREE TIMES A DAY AS DIRECTED, Disp: 45 mL, Rfl: 3    hydrALAZINE (APRESOLINE) 50 MG tablet, TAKE 1 TABLET EVERY 8 HOURS, Disp: 270 tablet, Rfl: 3    insulin needles, disposable, (PEN NEEDLE, DIABETIC) 31 Ndle, Patient needs 5 needles a day, Disp: 500 each, Rfl: 3    insulin needles, disposable, 32 x 5/32 " Ndle, 1 Device by Misc.(Non-Drug; Combo Route) route 5 (five) times daily., Disp: 400 each, Rfl: 6    lancets (ONETOUCH DELICA LANCETS) 33 gauge Misc, 1 lancet by Misc.(Non-Drug; Combo Route) route 4 (four) times daily before meals and nightly., Disp: 400 each, Rfl: 4    letrozole (FEMARA) 2.5 mg Tab, Take 1 tablet (2.5 mg total) by mouth every evening., Disp: 90 tablet, Rfl: 3    LEVEMIR FLEXTOUCH 100 unit/mL (3 mL) InPn pen, INJECT 40 UNITS UNDER THE SKIN TWICE A DAY, Disp: 75 mL, Rfl: 3    lisinopril (PRINIVIL,ZESTRIL) 40 MG tablet, TAKE 1 TABLET DAILY, Disp: 90 tablet, Rfl: 3    pravastatin (PRAVACHOL) 40 MG tablet, " TAKE 1 TABLET EVERY EVENING, Disp: 90 tablet, Rfl: 3    doxycycline (VIBRA-TABS) 100 MG tablet, Take 1 tablet (100 mg total) by mouth 2 (two) times daily. Remain upright for 60 minutes after each dose., Disp: 28 tablet, Rfl: 3    oxybutynin (DITROPAN-XL) 10 MG 24 hr tablet, Take 1 tablet (10 mg total) by mouth once daily., Disp: 90 tablet, Rfl: 3    oxybutynin (DITROPAN-XL) 10 MG 24 hr tablet, Take 1 tablet (10 mg total) by mouth once daily., Disp: 30 tablet, Rfl: 0    Current Facility-Administered Medications:     cefTRIAXone injection 2 g, 2 g, Intramuscular, Once, Spencer Roldan MD    lidocaine HCL 10 mg/ml (1%) injection 1 mL, 1 mL, Intramuscular, Once, Spencer Roldan MD     Review of patient's allergies indicates:   Allergen Reactions    Cyclobenzaprine Other (See Comments)     Patient stated causes hallucinations.     Erythromycin      Other reaction(s): Stomach upset    Keflex [cephalexin]      Yeast infection         Review of Systems  Review of Systems   Constitutional: Negative.  Negative for activity change, appetite change, chills, diaphoresis, fatigue, fever and unexpected weight change.   HENT: Negative.    Eyes: Negative.    Respiratory: Negative.  Negative for apnea, cough and wheezing.    Cardiovascular: Negative.  Negative for chest pain and palpitations.   Gastrointestinal: Negative for abdominal distention, abdominal pain, anal bleeding, blood in stool, constipation, diarrhea, nausea, rectal pain and vomiting.   Endocrine: Negative.    Genitourinary: Positive for frequency and urgency. Negative for decreased urine volume, difficulty urinating, dyspareunia, dysuria, enuresis, flank pain, genital sores, hematuria, menstrual problem, pelvic pain, vaginal bleeding, vaginal discharge and vaginal pain.   Musculoskeletal: Negative for back pain and gait problem.   Skin: Negative for color change, pallor, rash and wound.   Allergic/Immunologic: Negative for immunocompromised state.    Neurological: Negative.  Negative for dizziness and speech difficulty.   Hematological: Negative for adenopathy.   Psychiatric/Behavioral: Negative for agitation, behavioral problems, confusion and sleep disturbance.           Objective:     Physical Exam   Constitutional: She is oriented to person, place, and time. She appears well-developed.   Morbidly obese    HENT:   Head: Normocephalic and atraumatic.   Eyes: Conjunctivae and EOM are normal.   Neck: Normal range of motion. Neck supple.   Cardiovascular: Normal rate, regular rhythm, S1 normal, S2 normal, normal heart sounds and intact distal pulses.    Pulmonary/Chest: Effort normal and breath sounds normal. She exhibits no tenderness.   Abdominal: Soft. Bowel sounds are normal. She exhibits no distension and no mass. There is no hepatosplenomegaly, splenomegaly or hepatomegaly. There is no tenderness. There is no rigidity, no rebound, no guarding and no CVA tenderness. No hernia.   Genitourinary: Pelvic exam was performed with patient supine. Rectum normal, vagina normal, uterus normal, cervix normal, skenes normal and bartholins normal. Right labia normal and left labia normal. Urethra exhibits urethral caruncle and hypermobility. Urethra exhibits no urethral diverticulum and no urethral mass. Right bartholin is not enlarged and not tender. Left bartholin is not enlarged and not tender. Rectal exam shows resting tone normal and active tone normal. Rectal exam shows no external hemorrhoid, no fissure, no tenderness, anal tone normal and no dovetailing. Guaiac negative stool. There is atrophy in the vagina. No foreign body, tenderness, bleeding, unspecified prolapse of vaginal walls, fistula, mesh exposure or lavator tenderness in the vagina. No vaginal discharge found. Right adnexum displays no mass and no tenderness. Left adnexum displays no mass and no tenderness. Cervix exhibits no motion tenderness and no discharge. Uterus is not tender and not  experiencing uterine prolapse.   PVR: 30 ML  Empty cough stress test: Negative.  Kegel: 3/5    POP-Q  Aa: -2 Ba: -2 C: -5   GH: 3 PB: 2 TVL: 8   Ap: -2 Bp: -2 D: -6                     Musculoskeletal: Normal range of motion.   Lymphadenopathy:     She has no axillary adenopathy.        Right: No inguinal adenopathy present.        Left: No inguinal adenopathy present.   Neurological: She is alert and oriented to person, place, and time. She has normal strength and normal reflexes. Cranial nerves II through XII intact. No cranial nerve deficit.   Skin: Skin is warm, dry and intact.   Psychiatric: She has a normal mood and affect. Her speech is normal and behavior is normal. Judgment normal. Cognition and memory are normal.          Assessment:        1. Mixed urge and stress incontinence    2. Atrophic vaginitis    3. Nocturia    4. Full incontinence of feces    5. CKD (chronic kidney disease) stage 3, GFR 30-59 ml/min    6. Morbid obesity with BMI of 45.0-49.9, adult          Plan:      1.  Mixed urinary incontinence, urge > stress:   --Bladder diary for 3-7 days, write the number of times you go to the rest room and associated symptoms of urgency or leakage.   --Empty bladder every 3 hours.  Empty well: wait a minute, lean forward on toilet.    --Avoid dietary irritants (see sheet).  Keep diary x 3-5 days to determine your irritants.  --KEGELS: do 10 in AM and 10 in PM, holding each x 10 seconds.  When you feel urge to go, STOP, KEGEL, and when urge has passed, then go to bathroom.  Consider PT in future.    --URGE: Consider Ditropan 10 mg daily.  SE profile reviewed.    Takes 2-4 weeks to see if will have effect.  For dry mouth: get sour, sugar free lozenge or gum.    --STRESS:  Pessary vs. Sling.     2. Fecal incontinence  -- Bulk stool with fiber  --Lomotil as needed  --May need endoanal sonogram   --Discussed SNS, possible benefits   --consider colorectal evaluation if no improvement     3. --Nocturia  (nighttime urination): stop fluids 2 hours before bed.  If have leg swelling:  Elevate feet above chest x 1 hour before bed to get excess fluid off.  Can also use support hose (knee highs).      4. Vaginal atrophy (dryness):    Use REPLENS or REFRESH OTC: 1/2 applicator full in vagina twice a week.      5. UTI  --Cipro 500 mg     6.  Morbid Obesity: discussed healthy eating habits, patient trying to loose weight on her own if unable then nutritional consult. Obesity has been shown to be an independent risk factor for urinary incontinence. Weight loss has been shown to decrease incidence of urinary incontinence significantly.     7.--Nocturia (nighttime urination): stop fluids 2 hours before bed.  If have leg swelling:  Elevate feet above chest x 1 hour before bed to get excess fluid off.  Can also use support hose (knee highs).          Approximately 60 min were spent in consult, 90 % in discussion.     Thank you for requesting consultation of your patient.  I look forward to participating in her care.    Baltazar Balderas DO  Female Pelvic Medicine and Reconstructive Surgery  [facility] [place], [place]

## 2017-03-22 NOTE — TELEPHONE ENCOUNTER
----- Message from Beverly Camacho sent at 3/22/2017  4:18 PM CDT -----  Contact: self [phone]  Pt needing a call back regarding her Rx, pt can be reached at [phone].

## 2017-03-22 NOTE — PROGRESS NOTES
Subjective:      Patient ID: Kylie King is a 61 y.o. female.    Chief Complaint:Follow-up      History of Present Illness  Fell in bathroom Thursday. States right knee gave out - now both knees hurt from crawling on the floor.     Went to primary MD on Friday - found to have a UTI. Has felt very bad over the weekend. Had no idea that she had a UTI - has not received any call that she has a prescription to . States she returned the phone call but did not get an answer.     States that she had incontinence - this improved with previous treatment of Cipro in February, then worsened when she went off it. Denies any dysuria. Has stress incontinence and urge incontinence, sometimes urinates and does not realize it.    Review of Systems   Constitution: Negative for chills, decreased appetite, fever, weakness, malaise/fatigue, night sweats, weight gain and weight loss.   HENT: Positive for congestion and sore throat. Negative for ear pain, headaches, hearing loss, hoarse voice and tinnitus.    Eyes: Positive for blurred vision. Negative for redness and visual disturbance.   Cardiovascular: Negative for chest pain, leg swelling and palpitations.   Respiratory: Positive for cough and sputum production. Negative for hemoptysis and shortness of breath.    Hematologic/Lymphatic: Negative for adenopathy. Does not bruise/bleed easily.   Skin: Positive for dry skin. Negative for itching, rash and suspicious lesions.   Musculoskeletal: Positive for back pain, joint pain, myalgias and neck pain. Negative for falls.   Gastrointestinal: Negative for abdominal pain, constipation, diarrhea, heartburn, nausea and vomiting.   Genitourinary: Positive for bladder incontinence, frequency and incomplete emptying. Negative for dysuria, flank pain, hematuria, hesitancy and urgency.   Neurological: Negative for dizziness, numbness and paresthesias.   Psychiatric/Behavioral: Negative for depression and memory loss. The patient does  not have insomnia and is not nervous/anxious.      Objective:   Physical Exam   Constitutional: She is oriented to person, place, and time. She appears well-developed and well-nourished.   HENT:   Head: Normocephalic and atraumatic.   Eyes: Conjunctivae and EOM are normal. Pupils are equal, round, and reactive to light.   Cardiovascular: Normal rate, regular rhythm and normal heart sounds.    Pulmonary/Chest: Effort normal and breath sounds normal.   Abdominal: Soft. Bowel sounds are normal.   Musculoskeletal: Normal range of motion. She exhibits edema (Mild edema of both legs).        Right forearm: Normal. She exhibits no tenderness, no swelling, no edema and no deformity.   Neurological: She is alert and oriented to person, place, and time.   Skin: Skin is warm and dry.   Psychiatric: She has a normal mood and affect. Her behavior is normal. Judgment and thought content normal.   Nursing note and vitals reviewed.        Assessment:       1. CKD (chronic kidney disease) stage 3, GFR 30-59 ml/min    2. DM type 2, uncontrolled, with neuropathy    3. Diabetic peripheral neuropathy associated with type 2 diabetes mellitus    4. Right elbow pain    5. Incontinence of urine in female          Plan:       Rocephin 2 gm IM for UTI now. Will print Cipro Rx for therapy as well  Refer to urology for incontinence and SUDS  Cellulitis has resolved.  To see orthopedics for knee pain and radial fracture from fall  Return in 6 weeks for UTI and re-check. I suspect this is an ongoing problem, possibly related to urinary retention  She probably has neuropathy involving her bladder and may not be symptomatic from infections.

## 2017-03-22 NOTE — LETTER
March 27, 2017      Virginia Foote MD  [address] [facility] [place] [address]           [facility]  [address]  [place] [address]  Phone: [phone]          Patient: Kylie King   MR Number: [id number]   YOB: 1955   Date of Visit: 3/22/2017       Dear Dr. Virginia Foote:    Thank you for referring Kylie King to me for evaluation. Attached you will find relevant portions of my assessment and plan of care.    If you have questions, please do not hesitate to call me. I look forward to following Kylie King along with you.    Sincerely,    Laura Hills PA-C    Enclosure  CC:  No Recipients    If you would like to receive this communication electronically, please contact [email or web address] or [phone] to request more information on [employer] access.    For providers and/or their staff who would like to refer a patient to [facility], please contact us through our one-stop-shop provider referral line, [facility], at [phone].    If you feel you have received this communication in error or would no longer like to receive these types of communications, please e-mail [email or web address]

## 2017-03-22 NOTE — PROGRESS NOTES
Subject was consented for [facility] Protocol for the Collection of Women's Health Specimens  (IRB # 2016.065.A,PI - Fran).  The study was explained to the subject in  detail, and the Informed Consent was  reviewed, and discussed with the subject prior to consenting. All risks, and benefits, were discussed. Adequate time was given for the subject to ask questions and receive answers. Subject confirmed that all questions had been answered and  verbalized desire to participate in study, understanding that participation is voluntary and she can withdraw at any time. Subject signed Informed consent and a signed copy was provided. This Informed Consent process along with completion of Eligibility criteria was conducted prior to initiation of any study procedures. Subject was consented by Dr. Balderas.

## 2017-03-22 NOTE — MR AVS SNAPSHOT
[facility]  [address] [facility] [address]  Phone: [phone]                  Kylie King   3/22/2017 2:00 PM   Office Visit    Description:  Female : 1955   Provider:  Baltazar Balderas DO   Department:  [facility]           Reason for Visit     Consult     Urinary Incontinence     Urinary Tract Infection           Diagnoses this Visit        Comments    Mixed urge and stress incontinence    -  Primary     Atrophic vaginitis         Nocturia                To Do List           Future Appointments        Provider Department Dept Phone    3/27/2017 9:40 AM MD Troy Watkins - OB/ -[phone]    2017 9:30 AM Spencer Roldan MD [facility] - Infectious Diseases [phone]    2017 10:45 AM PRINCE Mccollum Jr.e - Podiatry [phone]      Goals (5 Years of Data)     None       These Medications        Disp Refills Start End    oxybutynin (DITROPAN-XL) 10 MG 24 hr tablet 30 tablet 11 3/22/2017 2017    Take 1 tablet (10 mg total) by mouth once daily. - Oral    Pharmacy: [facility] [address] #: [phone]         [facility]     [facility] -  Assistance  Registered nurses in the [facility] provide clinical advisement, health education, appointment booking, and other advisory services.  Call for this free service at [phone].             Medications           Message regarding Medications     Verify the changes and/or additions to your medication regime listed below are the same as discussed with your clinician today.  If any of these changes or additions are incorrect, please notify your healthcare provider.        START taking these NEW medications        Refills    oxybutynin (DITROPAN-XL) 10 MG 24 hr tablet 11    Sig: Take 1 tablet (10 mg total) by mouth once daily.    Class: Normal     "Route: Oral           Verify that the below list of medications is an accurate representation of the medications you are currently taking.  If none reported, the list may be blank. If incorrect, please contact your healthcare provider. Carry this list with you in case of emergency.           Current Medications     acetaminophen-codeine 300-30mg (TYLENOL-CODEINE #3) 300-30 mg Tab Take 1 tablet by mouth every 6 (six) hours as needed.    amlodipine (NORVASC) 10 MG tablet TAKE 1 TABLET EVERY EVENING    BD INSULIN PEN NEEDLE UF MINI 31 gauge x 3/16" Ndle USE 5 NEEDLES PER DAY    blood sugar diagnostic (ONETOUCH ULTRA TEST) Strp 1 strip by Misc.(Non-Drug; Combo Route) route 4 (four) times daily before meals and nightly.    carvedilol (COREG) 25 MG tablet Take 1 tablet (25 mg total) by mouth 2 (two) times daily.    ciprofloxacin HCl (CIPRO) 250 MG tablet Take 1 tablet (250 mg total) by mouth every 12 (twelve) hours.    citalopram (CELEXA) 20 MG tablet TAKE 1 TABLET NIGHTLY    doxycycline (VIBRA-TABS) 100 MG tablet Take 1 tablet (100 mg total) by mouth 2 (two) times daily. Remain upright for 60 minutes after each dose.    furosemide (LASIX) 40 MG tablet Take 1 tablet (40 mg total) by mouth once daily.    gabapentin (NEURONTIN) 400 MG capsule Take 1 capsule (400 mg total) by mouth 2 (two) times daily.    HUMALOG KWIKPEN 100 unit/mL InPn pen BLOOD GLUCOSE 150-200, INJECT 15 UNITS UNDER THE SKIN, 201-250, 18 UNITS, 251-300, 20 UNITS THREE TIMES A DAY AS DIRECTED    hydrALAZINE (APRESOLINE) 50 MG tablet TAKE 1 TABLET EVERY 8 HOURS    insulin needles, disposable, (PEN NEEDLE, DIABETIC) 31 Ndle Patient needs 5 needles a day    insulin needles, disposable, 32 x 5/32 " Ndle 1 Device by Misc.(Non-Drug; Combo Route) route 5 (five) times daily.    lancets (ONETOUCH DELICA LANCETS) 33 gauge Misc 1 lancet by Misc.(Non-Drug; Combo Route) route 4 (four) times daily before meals and nightly.    letrozole (FEMARA) 2.5 mg Tab Take 1 tablet " "(2.5 mg total) by mouth every evening.    LEVEMIR FLEXTOUCH 100 unit/mL (3 mL) InPn pen INJECT 40 UNITS UNDER THE SKIN TWICE A DAY    lisinopril (PRINIVIL,ZESTRIL) 40 MG tablet TAKE 1 TABLET DAILY    pravastatin (PRAVACHOL) 40 MG tablet TAKE 1 TABLET EVERY EVENING    oxybutynin (DITROPAN-XL) 10 MG 24 hr tablet Take 1 tablet (10 mg total) by mouth once daily.           Clinical Reference Information           Your Vitals Were     BP Height Weight BMI       132/70 5' 4" (1.626 m) 125.2 kg (276 lb 0.3 oz) 47.38 kg/m2       Blood Pressure          Most Recent Value    BP  132/70      Allergies as of 3/22/2017     Cyclobenzaprine    Erythromycin    Keflex [Cephalexin]      Immunizations Administered on Date of Encounter - 3/22/2017     None      Orders Placed During Today's Visit      Normal Orders This Visit    POCT URINE DIPSTICK WITHOUT MICROSCOPE       [facility] Sign-Up     Activating your [facility] account is as easy as 1-2-3!     1) Visit [email or web address], select Sign Up Now, enter this activation code and your date of birth, then select Next.  Activation code not generated  Current Patient Portal Status: Account disabled      2) Create a username and password to use when you visit [facility] in the future and select a security question in case you lose your password and select Next.    3) Enter your e-mail address and click Sign Up!    Additional Information  If you have questions, please e-mail [email or web address] or call [phone] to talk to our [facility] staff. Remember, [facility] is NOT to be used for urgent needs. For medical emergencies, dial 911.         Smoking Cessation     If you would like to quit smoking:   You may be eligible for free services if you are a [place] resident and started smoking cigarettes before September 1, 1988.  Call the [employer]oking [employer]essation [employer] (SCT) toll free at (504) 590-1707 or (879) 812-1180.   Call 0-626-QUIT-NOW if you do not meet the above criteria.            Language " Assistance Services     ATTENTION: Language assistance services are available, free of charge. Please call [phone].      ATENCIÓN: Si habla simeon, tiene a dominguez disposición servicios gratuitos de asistencia lingüística. Llame al [phone].     CHÚ Ý: N?u b?n nói Ti?ng Vi?t, có các d?ch v? h? tr? ngôn ng? mi?n phí dành cho b?n. G?i s? [phone].         [facility] complies with applicable Federal civil rights laws and does not discriminate on the basis of race, color, national origin, age, disability, or sex.

## 2017-03-23 NOTE — TELEPHONE ENCOUNTER
----- Message from Beverly Camacho sent at 3/23/2017  2:40 PM CDT -----  Contact: self  Pt states she is still awaiting a call back, pt states she has left 2 msgs already and can be reached at [phone].

## 2017-03-23 NOTE — TELEPHONE ENCOUNTER
Pt stated she wanted the 30 day rx Ditropan-XL 10 mg to sent to [facility] [phone] and the 90 day supply of the Ditropan-XL 10 mg w/4 refills to be sent to [facility]. Informed pt I'd relay this message to Dr Balderas and she'll be notified once rx is in. Pt voiced understanding and call ended. Please advise.

## 2017-03-23 NOTE — TELEPHONE ENCOUNTER
----- Message from Tawnya Estiven sent at 3/23/2017 11:03 AM CDT -----  Contact: KP CLARK [[id number]]  _  1st Request  _  2nd Request  _  3rd Request        Who: KP CLARK [[id number]]    Why: pt states she needs a 30- day Rxoxybutynin (DITROPAN-XL) 10 MG 24 hr tablet  [facility] and a  Four 90 day supply sent to [facility] - [address]    What Number to Call Back: [phone]    When to Expect a call back: (Before the end of the day)   -- if call after 3:00 call back will be tomorrow.

## 2017-03-24 ENCOUNTER — TELEPHONE (OUTPATIENT)
Dept: UROGYNECOLOGY | Facility: CLINIC | Age: 62
End: 2017-03-24

## 2017-03-24 RX ORDER — OXYBUTYNIN CHLORIDE 10 MG/1
10 TABLET, EXTENDED RELEASE ORAL DAILY
Qty: 90 TABLET | Refills: 3 | Status: SHIPPED | OUTPATIENT
Start: 2017-03-24 | End: 2017-04-19 | Stop reason: DRUGHIGH

## 2017-03-24 RX ORDER — OXYBUTYNIN CHLORIDE 10 MG/1
10 TABLET, EXTENDED RELEASE ORAL DAILY
Qty: 30 TABLET | Refills: 0 | Status: SHIPPED | OUTPATIENT
Start: 2017-03-24 | End: 2017-04-19 | Stop reason: SDUPTHER

## 2017-03-24 NOTE — TELEPHONE ENCOUNTER
Informed pt rx Ditropan 10 mg was sent to both pharmacies and she'll be notified once available. Pt voiced understanding and call ended.

## 2017-03-24 NOTE — TELEPHONE ENCOUNTER
----- Message from Sera Christianson sent at 3/24/2017 12:09 PM CDT -----  Contact: Patient  _ 1st Request  _X 2nd Request  _ 3rd Request        Who: KP CLARK [[id number]]     Why: pt states she needs a 30- day Rxoxybutynin (DITROPAN-XL) 10 MG 24 hr tablet [facility] and a Four 90 day supply sent to [facility] - [address]     What Number to Call Back: [phone]     When to Expect a call back: (Before the end of the day)  -- if call after 3:00 call back will be tomorrow.

## 2017-03-27 NOTE — PROGRESS NOTES
Subjective:      Patient ID: Kylie King is a 61 y.o. female.    Chief Complaint: Pain of the Right Knee and Pain of the Right Shoulder    HPI    Patient is a 61 year old female who presents to clinic with chief complaint of right knee instability and right shoulder injury. Patient reports frequent falls.   Patient has has right shoulder decreased range of motion with tingling in her fingers since falling form a standing height she she passed out December 2016. Patient reports that she is unable to reach out to the side or over her head. Patient report history of prior back and neck issues. She stated that he has history of neuropathy in her feet and fingers. She reports dropping things.   She is also experiencing chronic right knee instability x awhile. Patient reports that intermittently her knee will just give out on her causing her to fall. Seh is not sure what increase her symptoms.  She denied locking catching popping or cracking.       Hx of DM last A1C was 3/17/2017 10.4  Has hx of CKD      Review of Systems   Constitution: Negative for chills and fever.   Cardiovascular: Negative for chest pain.   Respiratory: Negative for cough and shortness of breath.    Skin: Negative for color change, dry skin, itching, nail changes, poor wound healing and rash.   Musculoskeletal: Positive for falls.   Neurological: Negative for dizziness.   Psychiatric/Behavioral: Negative for altered mental status. The patient is not nervous/anxious.    All other systems reviewed and are negative.        Objective:            General    Constitutional: She is oriented to person, place, and time. She appears well-developed and well-nourished. No distress.   HENT:   Head: Atraumatic.   Eyes: Conjunctivae are normal.   Cardiovascular: Normal rate.    Pulmonary/Chest: Effort normal.   Neurological: She is alert and oriented to person, place, and time.   Psychiatric: She has a normal mood and affect. Her behavior is normal.            Right Knee Exam     Inspection   Swelling: absent  Bruising: absent    Tenderness   The patient is experiencing no tenderness.         Range of Motion   Extension: 0   Flexion: 110     Tests   Ligament Examination Lachman: normal (-1 to 2mm) PCL-Posterior Drawer: normal (0 to 2mm)     MCL - Valgus: normal (0 to 2mm)  LCL - Varus: normal    Other   Sensation: decreased    Comments:  Walked into clinic with assistance of a Rolator.   Antalgic gait. Back (L-Spine & T-Spine) / Neck (C-Spine) Exam     Tenderness   The patient is tender to palpation of the right trapezial.   Right Shoulder Exam     Inspection/Observation   Swelling: absent  Bruising: absent    Tenderness   The patient is tender to palpation of the supraspinatus and biceps tendon.    Range of Motion   Active Abduction: 70   Passive Abduction: normal   Forward Flexion:  60 abnormal   Adduction: normal  External Rotation 0 degrees: abnormal   External Rotation 90 degrees: abnormal  Internal Rotation 0 degrees: abnormal   Internal Rotation 90 degrees: abnormal     Tests & Signs   Drop Arm: negative  Hawkin's test: positive  Impingement: positive  Speed's Test: positive    Other   Sensation: decreased    Comments:  Increased pain with all range of motion    Muscle Strength   Right Lower Extremity   Quadriceps:  4/5   Hamstrin/5     Vascular Exam     Right Pulses      Radial:                    2+      Capillary Refill  Right Hand: normal capillary refill      RADS:  SHOULDER: no fracture or dislocaiton  KNEE: no fracture or dislocation, degenerative changes noted.       Assessment:       Encounter Diagnoses   Name Primary?    Acute pain of right knee Yes    Acute pain of right shoulder     Frequent falls           Plan:       Discussed treatment plan with patient.   - Order placed for PT, Patient is to make appointment herself,   - Appointment made with neurology for evaluation of lower back and neuropathy.   - Patient is to follow up with  PCP about randomly falling asleep for hours at a time.   - Patient is to return to clinic as needed.

## 2017-03-31 PROBLEM — E66.01 MORBID OBESITY WITH BMI OF 45.0-49.9, ADULT: Status: ACTIVE | Noted: 2017-03-31

## 2017-03-31 PROBLEM — R35.1 NOCTURIA: Status: ACTIVE | Noted: 2017-03-31

## 2017-03-31 PROBLEM — N39.46 MIXED URGE AND STRESS INCONTINENCE: Status: ACTIVE | Noted: 2017-03-31

## 2017-04-06 ENCOUNTER — CLINICAL SUPPORT (OUTPATIENT)
Dept: REHABILITATION | Facility: HOSPITAL | Age: 62
End: 2017-04-06
Attending: ORTHOPAEDIC SURGERY
Payer: COMMERCIAL

## 2017-04-06 DIAGNOSIS — R53.1 GENERALIZED WEAKNESS: ICD-10-CM

## 2017-04-06 DIAGNOSIS — M25.619 DECREASED RANGE OF MOTION (ROM) OF SHOULDER: ICD-10-CM

## 2017-04-06 DIAGNOSIS — M25.669 DECREASED RANGE OF MOTION (ROM) OF KNEE: ICD-10-CM

## 2017-04-06 PROCEDURE — G8990 OTHER PT/OT CURRENT STATUS: HCPCS | Mod: CL,PO

## 2017-04-06 PROCEDURE — 97163 PT EVAL HIGH COMPLEX 45 MIN: CPT | Mod: PO

## 2017-04-06 PROCEDURE — G8991 OTHER PT/OT GOAL STATUS: HCPCS | Mod: CK,PO

## 2017-04-06 NOTE — PROGRESS NOTES
"                                                    Physical Therapy Initial Evaluation     Name: Kylie TAPIA [facility] Number: [id number]    Kylie is a 61 y.o. female evaluated on 04/06/2017.     Diagnosis: No diagnosis found.  Physician: Kvng Contreras MD  Treatment Orders: PT Eval and Treat    Past Medical History:   Diagnosis Date    Anxiety     Breast cancer 1/2013    left breast- invasive mammary carcinoma, ER/MO positive, Her2 negative    Choledocholithiasis     s/p ERCP and stent placement    Colon cancer 2001    CRI (chronic renal insufficiency)     one kidney    GERD (gastroesophageal reflux disease)     History of acute pancreatitis 2009 2/09 s/p sphincterotomy    History of colon cancer     s/p sigmoid colectomy    HTN (hypertension), benign     Hyperlipidemia     Intracerebral hemorrhage     Kidney stone     left    Obesity     Pancreatitis     Pancreatitis 2008    Stroke 12/2012    Tobacco abuse     Type 2 diabetes mellitus with neurological manifestations, controlled      Current Outpatient Prescriptions   Medication Sig    acetaminophen-codeine 300-30mg (TYLENOL-CODEINE #3) 300-30 mg Tab Take 1 tablet by mouth every 6 (six) hours as needed.    amlodipine (NORVASC) 10 MG tablet TAKE 1 TABLET EVERY EVENING    BD INSULIN PEN NEEDLE UF MINI 31 gauge x 3/16" Ndle USE 5 NEEDLES PER DAY    blood sugar diagnostic (ONETOUCH ULTRA TEST) Strp 1 strip by Misc.(Non-Drug; Combo Route) route 4 (four) times daily before meals and nightly.    carvedilol (COREG) 25 MG tablet Take 1 tablet (25 mg total) by mouth 2 (two) times daily.    ciprofloxacin HCl (CIPRO) 250 MG tablet Take 1 tablet (250 mg total) by mouth every 12 (twelve) hours.    citalopram (CELEXA) 20 MG tablet TAKE 1 TABLET NIGHTLY    doxycycline (VIBRA-TABS) 100 MG tablet Take 1 tablet (100 mg total) by mouth 2 (two) times daily. Remain upright for 60 minutes after each dose.    furosemide (LASIX) 40 MG tablet Take 1 " "tablet (40 mg total) by mouth once daily.    gabapentin (NEURONTIN) 400 MG capsule Take 1 capsule (400 mg total) by mouth 2 (two) times daily.    HUMALOG KWIKPEN 100 unit/mL InPn pen BLOOD GLUCOSE 150-200, INJECT 15 UNITS UNDER THE SKIN, 201-250, 18 UNITS, 251-300, 20 UNITS THREE TIMES A DAY AS DIRECTED    hydrALAZINE (APRESOLINE) 50 MG tablet TAKE 1 TABLET EVERY 8 HOURS    insulin needles, disposable, (PEN NEEDLE, DIABETIC) 31 Ndle Patient needs 5 needles a day    insulin needles, disposable, 32 x 5/32 " Ndle 1 Device by Misc.(Non-Drug; Combo Route) route 5 (five) times daily.    lancets (ONETOUCH DELICA LANCETS) 33 gauge Misc 1 lancet by Misc.(Non-Drug; Combo Route) route 4 (four) times daily before meals and nightly.    letrozole (FEMARA) 2.5 mg Tab Take 1 tablet (2.5 mg total) by mouth every evening.    LEVEMIR FLEXTOUCH 100 unit/mL (3 mL) InPn pen INJECT 40 UNITS UNDER THE SKIN TWICE A DAY    lisinopril (PRINIVIL,ZESTRIL) 40 MG tablet TAKE 1 TABLET DAILY    oxybutynin (DITROPAN-XL) 10 MG 24 hr tablet Take 1 tablet (10 mg total) by mouth once daily.    oxybutynin (DITROPAN-XL) 10 MG 24 hr tablet Take 1 tablet (10 mg total) by mouth once daily.    pravastatin (PRAVACHOL) 40 MG tablet TAKE 1 TABLET EVERY EVENING     Current Facility-Administered Medications   Medication    cefTRIAXone injection 2 g    lidocaine HCL 10 mg/ml (1%) injection 1 mL     Review of patient's allergies indicates:   Allergen Reactions    Cyclobenzaprine Other (See Comments)     Patient stated causes hallucinations.     Erythromycin      Other reaction(s): Stomach upset    Keflex [cephalexin]      Yeast infection     Precautions: Fall    Evaluation Date: 4/6/17   Visit # authorized: 20  Authorization period: 12/31/17  Plan of care Expiration: 6/6/17  MD referral: y     Subjective     Patient States: That she has had knee pain for about 2 years.  She notes that she was walking down the hallway at work and fell.  She notes " that she doesn't know why she fell, it just happened.  She notes that since then, her R knee pain has continued to get worse and worse and her R leg is very very weak.  She notes that she has had multiple falls since and continues to feel weak.  The pt reports that she had breast cancer in 2013 and colon cancer in 2000 but is now in remission.  She has also had 1 kidney removed.  She is in stage 4 kidney disease.  The pt reports she does have a room mate, but their schedules are different so she cannot rely on him completely.  She does note that she has a friend that comes while she showers in case she falls.  The pt has gotten a slip resistance mat and leaves her walker right outside the shower, which helps.  She notes that she has had multiple reconstruction surgeries, so she has lack of motion in the L arm.  She notes that the R shoulder has recently been very weak and difficult to lift.  The pt's last fall was a couple of weeks ago in the shower.  She notes that it took her an hour before she could crawl to the phone and call for help.  The pt reports that the fall before that was in December and she broke her R radius. The notes that the knee is pretty constant (worse with prolonged standing, prolonged sitting and driving) and the shoulder is constant and is tingling into her R thumb and medial forearm.  She notes that she concurrently gets periscapular pain and lateral shoulder pain.  The pt also with hx of CVA in 2012 and diabetes.  The pt does not leave the house much because she feels afraid she will fall.  The pt does ambulate with a rollator walker.  The pt has had 3 falls since January all while alone.    Diagnostic Tests: xray (+) R knee DJD, no evidence of injury to the R shoulder   Pain Scale: Kylie rates pain on a scale of 0-10 to be 6 (knee)/8 (shoulder) at worst; 4 (knee and shoulder)currently; 1(knee)/3(shoulder) at best .  Onset: gradual  Radicular symptoms:  LE-none, UE-tingling into R thumb and  lateral forearm   Aggravating factors:   Prolonged sitting/standing, walking (knee), cooking, lifting, and cutting vegetables (UE)  Easing factors: A hot shower, tylenol 3 if absolutely necessary   Prior Therapy: Prior therapy for her back but none for her knee or shoulder   Functional Deficits Leading to Referral: weakness, pain, decreased balance, gait abnormality and decreased endurance   Prior functional status: Independent but high fall risk.  s  DME owned/used: rollator, [employer]   Occupation:  Currently unemployed                    Pts goals: to reduce the pain, improve independence and increase confidence with ADLs   Objective     Observation: Pt morbidly obese with decreased trunk extension with standing and gait. The pt also with increased kyphosis and forward/rounded shoulders     Range of Motion: Knee   Left Right   Flexion: 115 94   Extension 0 0     Strength: Knee   Left Right   Quadriceps 4+/5 3+/5   Hamstrings 4+/5 3+/5     B hip strength: 3/5 globally     SHOULDER PROM:   R: FLEX = 116; ABD= 117; ER/0 = 38; ER/90 = 50; IR= 60  L = FLEX = 154; ABD = 120; ER/0 = 45; ER/90 = 75; IR = 63    SHOULDER strength:   R = 3-/5 globally   L = 3/5 globally       Joint Mobility: hypomobile R tibiofemoral and glenohumeral joint   Sensation: limited to light touch B LE secondary to diabetic neuropathy        Gait: Christine ambulated with rollator.  Level of Assistance: independent  Patient displays decreased trunk extension, decreased step length, decreased alice .     TREATMENT     Time In: 1:00 pm   Time Out: 2:00 pm     PT Evaluation Completed? Yes  Discussed Plan of Care with patient: Yes    Written Home Exercises Provided: none at this time   Kylie demonstrated good understanding of the education provided. Patient demonstrated good return demonstration of skill of exercises.    ASSESSMENT     Patient presents today s/p recent fall with R knee and R shoulder pain.  The pt notes that she continues to also have  low back pain that has been present for many years.  The pt presents with global weakness of trunk, B LE and B UE.  The pt also with limited ROM and mobility of the R knee and B shoulders. The pt with hx of multiple falls, poor balance and proprioception and demos limited safety with gait today.  The pt also presents with decreased muscular and cardiovascular endurance.   Pt prognosis is Guarded.  Pt will benefit from skilled outpatient physical therapy to address the above stated deficits, provide pt/family education and to maximize pt's level of independence.     Medical necessity is demonstrated by the following IMPAIRMENTS/PROBLEMS:  weakness, impaired endurance, impaired sensation, impaired self care skills, impaired functional mobility, gait instability, impaired balance, decreased coordination, decreased lower extremity function, pain, decreased ROM and impaired coordination  Co-morbidities and personal factors: diabetes mellitus, hypertension, osteoarthritis, peripheral edema and spinal stenosis, hx of CA x 2, multiple recent falls, depression, lives with a room mate but he is rarely ever home, hx of CVA.  Activity Limitations: decreased ability to safely perform ADL's, to perform work related duties .  Participation restrictions: currently unemployed due to disabilities, unable to safely live alone, cannot participate in social activities with friends .   Clinical presentation: unstable and unpredictable  Complexity: high      Medical necessity is demonstrated by the following IMPAIRMENTS/PROBLEMS:  1. Increased Pain  2. Decreased Segmental Mobility & Decreased ROM  3. Decreased Core & BLE strength  4. Decreased Flexibility BLE  5. Decreased Tolerance to Functional Activities    Pt's spiritual, cultural and educational needs considered and pt agreeable to plan of care and goals as stated below:     Anticipated Barriers for physical therapy: multiple co-morbidities that may affect the outcome of PT     Short  Term GOALS: 4 weeks. Pt agrees with goals set.  1. Patient demonstrates independence with HEP.   2. Patient demonstrates independence with Postural Awareness.   3. Patient demonstrates independence with body mechanics.     Long Term GOALS: 8 weeks. Pt agrees with goals set.  1. Patient demonstrates increased ROM by 5-10 degrees each to improve tolerance to functional activities pain free.   2. Patient demonstrates increased strength BLE's to 4/5 or greater to improve tolerance to functional activities pain free.   3. Patient demonstrates improved overall function per FOTO Knee Survey to 55% Limitation or less.     Functional Limitations Reports - G Codes  Category: mobility  Tool: FOTO Knee Survey  Score: 75% Limitation   Modifier  Impairment Limitation Restriction    CH  0 % impaired, limited or restricted    CI  @ least 1% but less than 20% impaired, limited or restricted    CJ  @ least 20%<40% impaired, limited or restricted    CK  @ least 40%<60% impaired, limited or restricted    CL  @ least 60% <80% impaired, limited or restricted    CM  @ least 80%<100% impaired limited or restricted    CN  100% impaired, limited or restricted     Current/  75% Limitation  Goal/ : 55% Limitation     PLAN     Outpatient physical therapy 1-2 times weekly to include: pt ed, hep, therapeutic exercises, neuromuscular re-education/ balance exercises, joint mobilizations, aquatic therapy and modalities prn. Cont PT for  6-8 weeks. Pt may be seen by PTA as part of the rehabilitation team.     Therapist: Padmaja Reagan, PT  4/6/2017

## 2017-04-13 PROBLEM — M25.619 DECREASED RANGE OF MOTION (ROM) OF SHOULDER: Status: ACTIVE | Noted: 2017-04-13

## 2017-04-13 PROBLEM — M25.669 DECREASED RANGE OF MOTION (ROM) OF KNEE: Status: ACTIVE | Noted: 2017-04-13

## 2017-04-13 PROBLEM — R53.1 GENERALIZED WEAKNESS: Status: ACTIVE | Noted: 2017-04-13

## 2017-04-13 NOTE — PLAN OF CARE
"                                                  Physical Therapy Initial Evaluation     Name: Kylie [facility] Number: [id number]    Kylie is a 61 y.o. female evaluated on 04/06/2017.     Diagnosis: No diagnosis found.  Physician: Kvng Contreras MD  Treatment Orders: PT Eval and Treat    Past Medical History:   Diagnosis Date    Anxiety     Breast cancer 1/2013    left breast- invasive mammary carcinoma, ER/NH positive, Her2 negative    Choledocholithiasis     s/p ERCP and stent placement    Colon cancer 2001    CRI (chronic renal insufficiency)     one kidney    GERD (gastroesophageal reflux disease)     History of acute pancreatitis 2009 2/09 s/p sphincterotomy    History of colon cancer     s/p sigmoid colectomy    HTN (hypertension), benign     Hyperlipidemia     Intracerebral hemorrhage     Kidney stone     left    Obesity     Pancreatitis     Pancreatitis 2008    Stroke 12/2012    Tobacco abuse     Type 2 diabetes mellitus with neurological manifestations, controlled      Current Outpatient Prescriptions   Medication Sig    acetaminophen-codeine 300-30mg (TYLENOL-CODEINE #3) 300-30 mg Tab Take 1 tablet by mouth every 6 (six) hours as needed.    amlodipine (NORVASC) 10 MG tablet TAKE 1 TABLET EVERY EVENING    BD INSULIN PEN NEEDLE UF MINI 31 gauge x 3/16" Ndle USE 5 NEEDLES PER DAY    blood sugar diagnostic (ONETOUCH ULTRA TEST) Strp 1 strip by Misc.(Non-Drug; Combo Route) route 4 (four) times daily before meals and nightly.    carvedilol (COREG) 25 MG tablet Take 1 tablet (25 mg total) by mouth 2 (two) times daily.    ciprofloxacin HCl (CIPRO) 250 MG tablet Take 1 tablet (250 mg total) by mouth every 12 (twelve) hours.    citalopram (CELEXA) 20 MG tablet TAKE 1 TABLET NIGHTLY    doxycycline (VIBRA-TABS) 100 MG tablet Take 1 tablet (100 mg total) by mouth 2 (two) times daily. Remain upright for 60 minutes after each dose.    furosemide (LASIX) 40 MG tablet Take 1 tablet " "(40 mg total) by mouth once daily.    gabapentin (NEURONTIN) 400 MG capsule Take 1 capsule (400 mg total) by mouth 2 (two) times daily.    HUMALOG KWIKPEN 100 unit/mL InPn pen BLOOD GLUCOSE 150-200, INJECT 15 UNITS UNDER THE SKIN, 201-250, 18 UNITS, 251-300, 20 UNITS THREE TIMES A DAY AS DIRECTED    hydrALAZINE (APRESOLINE) 50 MG tablet TAKE 1 TABLET EVERY 8 HOURS    insulin needles, disposable, (PEN NEEDLE, DIABETIC) 31 Ndle Patient needs 5 needles a day    insulin needles, disposable, 32 x 5/32 " Ndle 1 Device by Misc.(Non-Drug; Combo Route) route 5 (five) times daily.    lancets (ONETOUCH DELICA LANCETS) 33 gauge Misc 1 lancet by Misc.(Non-Drug; Combo Route) route 4 (four) times daily before meals and nightly.    letrozole (FEMARA) 2.5 mg Tab Take 1 tablet (2.5 mg total) by mouth every evening.    LEVEMIR FLEXTOUCH 100 unit/mL (3 mL) InPn pen INJECT 40 UNITS UNDER THE SKIN TWICE A DAY    lisinopril (PRINIVIL,ZESTRIL) 40 MG tablet TAKE 1 TABLET DAILY    oxybutynin (DITROPAN-XL) 10 MG 24 hr tablet Take 1 tablet (10 mg total) by mouth once daily.    oxybutynin (DITROPAN-XL) 10 MG 24 hr tablet Take 1 tablet (10 mg total) by mouth once daily.    pravastatin (PRAVACHOL) 40 MG tablet TAKE 1 TABLET EVERY EVENING     Current Facility-Administered Medications   Medication    cefTRIAXone injection 2 g    lidocaine HCL 10 mg/ml (1%) injection 1 mL     Review of patient's allergies indicates:   Allergen Reactions    Cyclobenzaprine Other (See Comments)     Patient stated causes hallucinations.     Erythromycin      Other reaction(s): Stomach upset    Keflex [cephalexin]      Yeast infection     Precautions: Fall    Evaluation Date: 4/6/17   Visit # authorized: 20  Authorization period: 12/31/17  Plan of care Expiration: 6/6/17  MD referral: y     Subjective     Patient States: That she has had knee pain for about 2 years.  She notes that she was walking down the hallway at work and fell.  She notes that she " doesn't know why she fell, it just happened.  She notes that since then, her R knee pain has continued to get worse and worse and her R leg is very very weak.  She notes that she has had multiple falls since and continues to feel weak.  The pt reports that she had breast cancer in 2013 and colon cancer in 2000 but is now in remission.  She has also had 1 kidney removed.  She is in stage 4 kidney disease.  The pt reports she does have a room mate, but their schedules are different so she cannot rely on him completely.  She does note that she has a friend that comes while she showers in case she falls.  The pt has gotten a slip resistance mat and leaves her walker right outside the shower, which helps.  She notes that she has had multiple reconstruction surgeries, so she has lack of motion in the L arm.  She notes that the R shoulder has recently been very weak and difficult to lift.  The pt's last fall was a couple of weeks ago in the shower.  She notes that it took her an hour before she could crawl to the phone and call for help.  The pt reports that the fall before that was in December and she broke her R radius. The notes that the knee is pretty constant (worse with prolonged standing, prolonged sitting and driving) and the shoulder is constant and is tingling into her R thumb and medial forearm.  She notes that she concurrently gets periscapular pain and lateral shoulder pain.  The pt also with hx of CVA in 2012 and diabetes.  The pt does not leave the house much because she feels afraid she will fall.  The pt does ambulate with a rollator walker.  The pt has had 3 falls since January all while alone.    Diagnostic Tests: xray (+) R knee DJD, no evidence of injury to the R shoulder   Pain Scale: Kylie rates pain on a scale of 0-10 to be 6 (knee)/8 (shoulder) at worst; 4 (knee and shoulder)currently; 1(knee)/3(shoulder) at best .  Onset: gradual  Radicular symptoms:  LE-none, UE-tingling into R thumb and lateral  forearm   Aggravating factors:   Prolonged sitting/standing, walking (knee), cooking, lifting, and cutting vegetables (UE)  Easing factors: A hot shower, tylenol 3 if absolutely necessary   Prior Therapy: Prior therapy for her back but none for her knee or shoulder   Functional Deficits Leading to Referral: weakness, pain, decreased balance, gait abnormality and decreased endurance   Prior functional status: Independent but high fall risk.  s  DME owned/used: rollator, [employer]   Occupation:  Currently unemployed                    Pts goals: to reduce the pain, improve independence and increase confidence with ADLs   Objective     Observation: Pt morbidly obese with decreased trunk extension with standing and gait. The pt also with increased kyphosis and forward/rounded shoulders     Range of Motion: Knee   Left Right   Flexion: 115 94   Extension 0 0     Strength: Knee   Left Right   Quadriceps 4+/5 3+/5   Hamstrings 4+/5 3+/5     B hip strength: 3/5 globally     SHOULDER PROM:   R: FLEX = 116; ABD= 117; ER/0 = 38; ER/90 = 50; IR= 60  L = FLEX = 154; ABD = 120; ER/0 = 45; ER/90 = 75; IR = 63    SHOULDER strength:   R = 3-/5 globally   L = 3/5 globally       Joint Mobility: hypomobile R tibiofemoral and glenohumeral joint   Sensation: limited to light touch B LE secondary to diabetic neuropathy        Gait: Christine ambulated with rollator.  Level of Assistance: independent  Patient displays decreased trunk extension, decreased step length, decreased alice .     TREATMENT     Time In: 1:00 pm   Time Out: 2:00 pm     PT Evaluation Completed? Yes  Discussed Plan of Care with patient: Yes    Written Home Exercises Provided: none at this time   Kylie demonstrated good understanding of the education provided. Patient demonstrated good return demonstration of skill of exercises.    ASSESSMENT     Patient presents today s/p recent fall with R knee and R shoulder pain.  The pt notes that she continues to also have low back  pain that has been present for many years.  The pt presents with global weakness of trunk, B LE and B UE.  The pt also with limited ROM and mobility of the R knee and B shoulders. The pt with hx of multiple falls, poor balance and proprioception and demos limited safety with gait today.  The pt also presents with decreased muscular and cardiovascular endurance.   Pt prognosis is Guarded.  Pt will benefit from skilled outpatient physical therapy to address the above stated deficits, provide pt/family education and to maximize pt's level of independence.     Medical necessity is demonstrated by the following IMPAIRMENTS/PROBLEMS:  weakness, impaired endurance, impaired sensation, impaired self care skills, impaired functional mobility, gait instability, impaired balance, decreased coordination, decreased lower extremity function, pain, decreased ROM and impaired coordination  Co-morbidities and personal factors: diabetes mellitus, hypertension, osteoarthritis, peripheral edema and spinal stenosis, hx of CA x 2, multiple recent falls, depression, lives with a room mate but he is rarely ever home, hx of CVA.  Activity Limitations: decreased ability to safely perform ADL's, to perform work related duties .  Participation restrictions: currently unemployed due to disabilities, unable to safely live alone, cannot participate in social activities with friends .   Clinical presentation: unstable and unpredictable  Complexity: high      Medical necessity is demonstrated by the following IMPAIRMENTS/PROBLEMS:  1. Increased Pain  2. Decreased Segmental Mobility & Decreased ROM  3. Decreased Core & BLE strength  4. Decreased Flexibility BLE  5. Decreased Tolerance to Functional Activities    Pt's spiritual, cultural and educational needs considered and pt agreeable to plan of care and goals as stated below:     Anticipated Barriers for physical therapy: multiple co-morbidities that may affect the outcome of PT     Short Term  GOALS: 4 weeks. Pt agrees with goals set.  1. Patient demonstrates independence with HEP.   2. Patient demonstrates independence with Postural Awareness.   3. Patient demonstrates independence with body mechanics.     Long Term GOALS: 8 weeks. Pt agrees with goals set.  1. Patient demonstrates increased ROM by 5-10 degrees each to improve tolerance to functional activities pain free.   2. Patient demonstrates increased strength BLE's to 4/5 or greater to improve tolerance to functional activities pain free.   3. Patient demonstrates improved overall function per FOTO Knee Survey to 55% Limitation or less.     Functional Limitations Reports - G Codes  Category: mobility  Tool: FOTO Knee Survey  Score: 75% Limitation   Modifier  Impairment Limitation Restriction    CH  0 % impaired, limited or restricted    CI  @ least 1% but less than 20% impaired, limited or restricted    CJ  @ least 20%<40% impaired, limited or restricted    CK  @ least 40%<60% impaired, limited or restricted    CL  @ least 60% <80% impaired, limited or restricted    CM  @ least 80%<100% impaired limited or restricted    CN  100% impaired, limited or restricted     Current/  75% Limitation  Goal/ : 55% Limitation     PLAN     Outpatient physical therapy 1-2 times weekly to include: pt ed, hep, therapeutic exercises, neuromuscular re-education/ balance exercises, joint mobilizations, aquatic therapy and modalities prn. Cont PT for  6-8 weeks. Pt may be seen by PTA as part of the rehabilitation team.     Therapist: Padmaja Reagan, PT  4/6/2017      Kvng Contreras MD  04/13/2017

## 2017-04-19 ENCOUNTER — OFFICE VISIT (OUTPATIENT)
Dept: UROGYNECOLOGY | Facility: CLINIC | Age: 62
End: 2017-04-19
Attending: OBSTETRICS & GYNECOLOGY
Payer: COMMERCIAL

## 2017-04-19 VITALS
DIASTOLIC BLOOD PRESSURE: 74 MMHG | SYSTOLIC BLOOD PRESSURE: 120 MMHG | BODY MASS INDEX: 47.5 KG/M2 | WEIGHT: 278.25 LBS | HEIGHT: 64 IN

## 2017-04-19 DIAGNOSIS — Z85.038 HISTORY OF COLON CANCER: ICD-10-CM

## 2017-04-19 DIAGNOSIS — N39.46 MIXED URGE AND STRESS INCONTINENCE: Primary | ICD-10-CM

## 2017-04-19 DIAGNOSIS — E66.01 MORBID OBESITY WITH BMI OF 45.0-49.9, ADULT: ICD-10-CM

## 2017-04-19 DIAGNOSIS — R35.1 NOCTURIA: ICD-10-CM

## 2017-04-19 DIAGNOSIS — R15.9 FULL INCONTINENCE OF FECES: ICD-10-CM

## 2017-04-19 DIAGNOSIS — N18.30 CKD (CHRONIC KIDNEY DISEASE) STAGE 3, GFR 30-59 ML/MIN: ICD-10-CM

## 2017-04-19 PROCEDURE — 1160F RVW MEDS BY RX/DR IN RCRD: CPT | Mod: S$GLB,,, | Performed by: OBSTETRICS & GYNECOLOGY

## 2017-04-19 PROCEDURE — 99999 PR PBB SHADOW E&M-EST. PATIENT-LVL IV: CPT | Mod: PBBFAC,,, | Performed by: OBSTETRICS & GYNECOLOGY

## 2017-04-19 PROCEDURE — 3078F DIAST BP <80 MM HG: CPT | Mod: S$GLB,,, | Performed by: OBSTETRICS & GYNECOLOGY

## 2017-04-19 PROCEDURE — 3074F SYST BP LT 130 MM HG: CPT | Mod: S$GLB,,, | Performed by: OBSTETRICS & GYNECOLOGY

## 2017-04-19 PROCEDURE — 99213 OFFICE O/P EST LOW 20 MIN: CPT | Mod: S$GLB,,, | Performed by: OBSTETRICS & GYNECOLOGY

## 2017-04-19 RX ORDER — OXYBUTYNIN CHLORIDE 15 MG/1
15 TABLET, EXTENDED RELEASE ORAL DAILY
Qty: 90 TABLET | Refills: 4 | Status: SHIPPED | OUTPATIENT
Start: 2017-04-19 | End: 2017-12-08 | Stop reason: SDUPTHER

## 2017-04-19 NOTE — PROGRESS NOTES
Subjective:       Patient ID: Kylie King is a 61 y.o. female.    Chief Complaint:  Follow-up; Urinary Tract Infection; Vaginal Discharge; and Vulvar Itch      History of Present Illness: 61 Y O F  multiple medical problems with history of colon cancer and beast cancer in 2013 treated with mastectomy and radiation. She also had a left  Nephrectomy due to hydroureter initially noted after the colon resection and has a history of abdominal hernia repair with mesh placement.  She presents with for evaluation of urinary incontinence worsening since the abdominal hernia repair.     Bladder Diary: recall   # of voids:  10-12   Average void per hour: Q 2 hours   Average void volume:   + MICHAEL or ++ urge incontinence: very bothersome     Interval  HP since the last visit   1)  UI:  (+) MICHAEL  (+) UUI  - (+) pads:4 -6  Daytime frequency: Q 1-2  hours.  Nocturia: +:    (-) dysuria-  (--) hematuria,  (--) frequent UTIs.  (+) complete bladder emptying.     2)  POP:  Symptoms:(--)  .  (--) vaginal bleeding. (--) vaginal discharge. (+) sexually active.  (--) dyspareunia.   (--)  Vaginal dryness.  (--) vaginal estrogen use. Using trimosan with pure coconut oil.    3)  BM:  (+) constipation/straining.  (--) chronic diarrhea. (--) hematochezia.  (+) fecal incontinence.  (--) fecal smearing/urgency.  (--) incomplete evacuation.  History of colon cancer with ? short bowel syndrome         Urinary Tract Infection    Associated symptoms include frequency and urgency. Pertinent negatives include no chills, flank pain, hematuria, nausea, vomiting, constipation or rash.   Vaginal Discharge   The patient's primary symptoms include vaginal discharge. The patient's pertinent negatives include no pelvic pain. Associated symptoms include frequency and urgency. Pertinent negatives include no abdominal pain, back pain, chills, constipation, diarrhea, dysuria, fever, flank pain, hematuria, nausea, rash or vomiting.          [facility]   "    Question    3/22/2017  2:21 PM CDT     General Urogynecology: Are you experiencing the following?       Dysuria (painful urination)  No     Nocturia:  waking up at night to empty your bladder   Yes     If you answered yes to the previous question, how many times does this happen per night?  1-2     Enuresis (urine loss during sleep)  Yes     Dribbling urine after you urinate  Yes     Hematuria (urine appears red)  No     Type of stream  Splayed     Urinary Incontinence (General): Are you experiencing the following?       Past consultation for incontinence: Have you ever seen someone for the evaluation of incontinence?  No     If you answered yes to the previous question, please select all the therapies you have tried.   N/a- I answered no to the previous question     Please note the effectiveness of the therapies.       Need to wear protection to keep clothes dry   Yes     If you answered yes to the previous question, please telly the protection you use.   Pads       Poise       Diaper       Pad and diaper     If you wear protection, how much wetness is typically on each pad?  Soaked     If you wear protection, how often do you have to change per day, if applicable?   5-6 ( 2 pads and 2 diapers every time)     Stress Symptoms: Are you experiencing the following?       Leakage of urine with cough, laugh and/or sneeze  Yes     If you answered yes to the previous question, what is the frequency in days, weeks and/or months?  Several times a day     Leakage of urine with sex  No     Leakage of urine with bending/ lifting  Yes     Leakage of urine with briskly walking or jogging  Yes     If you lose urine for any other reason not previously mentioned, please note it below, if applicable.   sitting; standing; bending: lifting;     Urge Symptoms: Are you experiencing the following?       Urgency ("got to go" feeling)  Yes     Urge: How frequently do you feel an urge to urinate (feeling like you "gotta go" to the " bathroom and can't wait)  Several times a day     Do you experience a leakage of urine when you have a feeling of urgency?   Yes     Leakage of urine when unaware  Yes     Past use of anticholinergics (medications used to treat overactive bladder)  No     If you answered yes to the previous question, please telly the anticholinergics you have used:        Have you ever used Mirbetriq (aka Mirabegron)?   No     Prolapse Symptoms: Are you experiencing any of the following?        Falling out/ Bulging/ Heaviness in the vagina  No     Vaginal/ Abdominal Pain/ Pressure  No     Need to strain/ Push to void  No     Need to wait on the toilet before you void  No     Unusual position to urinate (using your hands to push back the vaginal bulge)  No     Sensation of incomplete emptying  Yes     Past use of pessary device  No     If you answered yes to the previous question, please list the devices you have used below.        Bowel Symptoms: Are you experiencing any of the following?       Constipation  No     Diarrhea   Yes     Hematochezia (bloody stool)  No     Incomplete evacuation of stool  Yes     Involuntary loss of formed stool  Yes     Fecal smearing/urgency  Yes     Involuntary loss of gas  Yes     Vaginal Symptoms: Are you experiencing any of the following?        Abnormal vaginal bleeding   No     Vaginal dryness  Yes     Sexually active   No     Dyspareunia (painful intercourse)  No     Estrogen use   Yes   HPI reviewed and confirmed with patient       GYN & OB History  No LMP recorded. Patient is postmenopausal.   Date of Last Pap: No result found    OB History    Para Term  AB SAB TAB Ectopic Multiple Living   2               # Outcome Date GA Lbr Vance/2nd Weight Sex Delivery Anes PTL Lv   2             1                  Past Medical History:   Diagnosis Date    Anxiety     Breast cancer 2013    left breast- invasive mammary carcinoma, ER/TN positive, Her2 negative     "Choledocholithiasis     s/p ERCP and stent placement    Colon cancer 2001    CRI (chronic renal insufficiency)     one kidney    GERD (gastroesophageal reflux disease)     History of acute pancreatitis 2009 2/09 s/p sphincterotomy    History of colon cancer     s/p sigmoid colectomy    HTN (hypertension), benign     Hyperlipidemia     Intracerebral hemorrhage     Kidney stone     left    Obesity     Pancreatitis     Pancreatitis 2008    Stroke 12/2012    Tobacco abuse     Type 2 diabetes mellitus with neurological manifestations, controlled      Past Surgical History:   Procedure Laterality Date    BREAST BIOPSY  1/2012    left breast invasive mammary carcinoma (lateral bx) and intraductal papilloma (medial bx)    BREAST LUMPECTOMY  1999    right breast- benign fibroadenoma    BREAST RECONSTRUCTION  2013    CHOLECYSTECTOMY  2001    COLON SURGERY      HERNIA REPAIR      left nephrectomy      atrophic kidney and hydronephrosis    left oopherectomy  2001    MASTECTOMY  2013    left    NEPHRECTOMY  2011    lap    SIGMOIDECTOMY  2001       Current Outpatient Prescriptions:     acetaminophen-codeine 300-30mg (TYLENOL-CODEINE #3) 300-30 mg Tab, Take 1 tablet by mouth every 6 (six) hours as needed., Disp: 90 tablet, Rfl: 2    amlodipine (NORVASC) 10 MG tablet, TAKE 1 TABLET EVERY EVENING, Disp: 90 tablet, Rfl: 3    BD INSULIN PEN NEEDLE UF MINI 31 gauge x 3/16" Ndle, USE 5 NEEDLES PER DAY, Disp: 450 each, Rfl: 2    blood sugar diagnostic (ONETOUCH ULTRA TEST) Strp, 1 strip by Misc.(Non-Drug; Combo Route) route 4 (four) times daily before meals and nightly., Disp: 400 strip, Rfl: 4    carvedilol (COREG) 25 MG tablet, Take 1 tablet (25 mg total) by mouth 2 (two) times daily., Disp: 180 tablet, Rfl: 4    citalopram (CELEXA) 20 MG tablet, TAKE 1 TABLET NIGHTLY, Disp: 90 tablet, Rfl: 3    doxycycline (VIBRA-TABS) 100 MG tablet, Take 1 tablet (100 mg total) by mouth 2 (two) times daily. Remain " "upright for 60 minutes after each dose., Disp: 28 tablet, Rfl: 3    furosemide (LASIX) 40 MG tablet, Take 1 tablet (40 mg total) by mouth once daily., Disp: 90 tablet, Rfl: 4    gabapentin (NEURONTIN) 400 MG capsule, Take 1 capsule (400 mg total) by mouth 2 (two) times daily., Disp: 180 capsule, Rfl: 0    HUMALOG KWIKPEN 100 unit/mL InPn pen, BLOOD GLUCOSE 150-200, INJECT 15 UNITS UNDER THE SKIN, 201-250, 18 UNITS, 251-300, 20 UNITS THREE TIMES A DAY AS DIRECTED, Disp: 45 mL, Rfl: 3    hydrALAZINE (APRESOLINE) 50 MG tablet, TAKE 1 TABLET EVERY 8 HOURS, Disp: 270 tablet, Rfl: 3    insulin needles, disposable, (PEN NEEDLE, DIABETIC) 31 Ndle, Patient needs 5 needles a day, Disp: 500 each, Rfl: 3    insulin needles, disposable, 32 x 5/32 " Ndle, 1 Device by Misc.(Non-Drug; Combo Route) route 5 (five) times daily., Disp: 400 each, Rfl: 6    lancets (ONETOUCH DELICA LANCETS) 33 gauge Misc, 1 lancet by Misc.(Non-Drug; Combo Route) route 4 (four) times daily before meals and nightly., Disp: 400 each, Rfl: 4    letrozole (FEMARA) 2.5 mg Tab, Take 1 tablet (2.5 mg total) by mouth every evening., Disp: 90 tablet, Rfl: 3    LEVEMIR FLEXTOUCH 100 unit/mL (3 mL) InPn pen, INJECT 40 UNITS UNDER THE SKIN TWICE A DAY, Disp: 75 mL, Rfl: 3    lisinopril (PRINIVIL,ZESTRIL) 40 MG tablet, TAKE 1 TABLET DAILY, Disp: 90 tablet, Rfl: 3    pravastatin (PRAVACHOL) 40 MG tablet, TAKE 1 TABLET EVERY EVENING, Disp: 90 tablet, Rfl: 3    oxybutynin (DITROPAN XL) 15 MG TR24, Take 1 tablet (15 mg total) by mouth once daily., Disp: 90 tablet, Rfl: 4    Current Facility-Administered Medications:     cefTRIAXone injection 2 g, 2 g, Intramuscular, Once, Spencer Roldan MD    lidocaine HCL 10 mg/ml (1%) injection 1 mL, 1 mL, Intramuscular, Once, Spencer Roldan MD     Review of patient's allergies indicates:   Allergen Reactions    Cyclobenzaprine Other (See Comments)     Patient stated causes hallucinations.     Erythromycin      " Other reaction(s): Stomach upset    Keflex [cephalexin]      Yeast infection         Review of Systems  Review of Systems   Constitutional: Negative.  Negative for activity change, appetite change, chills, diaphoresis, fatigue, fever and unexpected weight change.   HENT: Negative.    Eyes: Negative.    Respiratory: Negative.  Negative for apnea, cough and wheezing.    Cardiovascular: Negative.  Negative for chest pain and palpitations.   Gastrointestinal: Negative for abdominal distention, abdominal pain, anal bleeding, blood in stool, constipation, diarrhea, nausea, rectal pain and vomiting.   Endocrine: Negative.    Genitourinary: Positive for frequency, urgency and vaginal discharge. Negative for decreased urine volume, difficulty urinating, dyspareunia, dysuria, enuresis, flank pain, genital sores, hematuria, menstrual problem, pelvic pain, vaginal bleeding and vaginal pain.   Musculoskeletal: Negative for back pain and gait problem.   Skin: Negative for color change, pallor, rash and wound.   Allergic/Immunologic: Negative for immunocompromised state.   Neurological: Negative.  Negative for dizziness and speech difficulty.   Hematological: Negative for adenopathy.   Psychiatric/Behavioral: Negative for agitation, behavioral problems, confusion and sleep disturbance.           Objective:     Physical Exam   Constitutional: She is oriented to person, place, and time. She appears well-developed and well-nourished.   HENT:   Head: Normocephalic and atraumatic.   Neck: Normal range of motion. Neck supple.   Pulmonary/Chest: No respiratory distress. She has no wheezes.   Musculoskeletal: Normal range of motion.   Neurological: She is alert and oriented to person, place, and time.   Skin: No cyanosis. Nails show no clubbing.   Psychiatric: She has a normal mood and affect. Her behavior is normal. Judgment and thought content normal.          Assessment:        1. Mixed urge and stress incontinence    2. Nocturia     3. Full incontinence of feces    4. CKD (chronic kidney disease) stage 3, GFR 30-59 ml/min    5. Morbid obesity with BMI of 45.0-49.9, adult    6. History of colon cancer          Plan:      1.  Mixed urinary incontinence, urge > stress:   --Bladder diary for 3-7 days, verbal recall: we discussed dietary irritants and reducing the number of diet Dr. Harris which she drinks per day.   --Empty bladder every 3 hours.  Empty well: wait a minute, lean forward on toilet.  Emphasized timed voids.   --Avoid dietary irritants (see sheet).  Keep diary x 3-5 days to determine your irritants.  --KEGELS: do 10 in AM and 10 in PM, holding each x 10 seconds.  When you feel urge to go, STOP, KEGEL, and when urge has passed, then go to bathroom.     --URGE:  increase Ditropan 15 mg daily.  SE profile reviewed.    Takes 2-4 weeks to see if will have effect.  For dry mouth: get sour, sugar free lozenge or gum.    --STRESS:  Pessary vs. Sling.     2. Fecal incontinence: one accident in last couple of weeks   -- continue to bulk stool with fiber  --Lomotil as needed  --Discussed SNS, possible benefits   --Colorectal evaluation    3. --Nocturia (nighttime urination): stop fluids 2 hours before bed.  If have leg swelling:  Elevate feet above chest x 1 hour before bed to get excess fluid off.  Can also use support hose (knee highs).      4. Vaginal atrophy (dryness):    Use REPLENS or REFRESH OTC: 1/2 applicator full in vagina twice a week.      5. UTI symptoms   -- Urine cx      6.  Morbid Obesity: discussed healthy eating habits, patient trying to loose weight on her own if unable then nutritional consult. Obesity has been shown to be an independent risk factor for urinary incontinence. Weight loss has been shown to decrease incidence of urinary incontinence significantly.     7.--Nocturia (nighttime urination): stop fluids 2 hours before bed.  If have leg swelling:  Elevate feet above chest x 1 hour before bed to get excess fluid  off.  Can also use support hose (knee highs).      8.Hx Colon cancer   -- Due for colonoscopy to see Dr. Galeano       Approximately 30 min were spent in consult, 90 % in discussion.     Baltazar Balderas DO  Female Pelvic Medicine and Reconstructive Surgery  [facility] [place], [place]

## 2017-04-21 ENCOUNTER — CLINICAL SUPPORT (OUTPATIENT)
Dept: REHABILITATION | Facility: HOSPITAL | Age: 62
End: 2017-04-21
Attending: ORTHOPAEDIC SURGERY
Payer: COMMERCIAL

## 2017-04-21 DIAGNOSIS — M25.619 DECREASED RANGE OF MOTION (ROM) OF SHOULDER: ICD-10-CM

## 2017-04-21 DIAGNOSIS — M25.669 DECREASED RANGE OF MOTION (ROM) OF KNEE: ICD-10-CM

## 2017-04-21 DIAGNOSIS — R53.1 GENERALIZED WEAKNESS: ICD-10-CM

## 2017-04-21 PROCEDURE — 97110 THERAPEUTIC EXERCISES: CPT | Mod: PO

## 2017-04-21 NOTE — PROGRESS NOTES
Physical Therapy Daily Note     Name: Kylie Barakatford  Clinic Number: [id number]  Diagnosis:   Encounter Diagnoses   Name Primary?    Generalized weakness     Decreased range of motion (ROM) of shoulder     Decreased range of motion (ROM) of knee      Physician: Kvng Contreras MD  Precautions: fall  Evaluation Date: 4/6/17   Visit # authorized: 2/20  Authorization period: 12/31/17  Plan of care Expiration: 6/6/17  MD referral: y   Time In: 1:00  Time Out: 1:55  Treatment time: 45    Subjective     Pt reports: UE/LE weakness and Pain. Symptoms mostly isolated to (R) shoulder and knee after a fall in November.   Pain Scale: Kylie rates pain on a scale of 0-10 to be 4 currently.    Objective    Patient to dept Amb Mod Independently with a rollator walker.     Kylie received individual therapeutic exercises to develop LE/UE strength, ROM and flexibility for 45 minutes including:   Supine AAROM Heel slides on (R) for knee flex stretch 2 x 10 reps.  QS x 20  GS x 20  Hooklying Hip Abd/ER with GTB 2 x 10  Hooklying Hip add Isometric 2 x 10  SAQ 2 x 10  AAROM shoulder flex with dowel 1x 10   LAQ 2 x 10  Seated hip flex 2 x 10  Repeated sit<->stand from mat 1 x 5 trials ( Min cues for eccentric control when sitting)  AAROM Shoulder flex on pulleys 2 x 10  (B) shoulder er ( sitting)  No resistance 2 x 10    Patient receives a cold pack applied to (R) shoulder x 10 minutes for pain control. ( Declined application of cold pack to knee)     Patient education: Patient educated in and provided with an updated copy of HEP to include: QS/GS, Hooklying hip abd/ER with GTB, Hooklying hip add isometric, Heel slides, AAROM shoulder flex with dowel, LAQ. She was educated to avoid exacerbation of pain with ex's which she voices understanding and compliance.  No spiritual or educational barriers to learning     Assessment     Patient tolerated beny Tx fair. She was able to perform the  above ex's without an increase in pain symptoms but remains challenged with ex's due to (R) knee/pain and weakness.  Some difficulty with sit<->stand transition due to weakness and expresses fear of falling. Discomfort level remained about the same post Tx but states that cryotherapy Tx felt good to (R) shoulder..  This is a 61 y.o. female referred to outpatient physical therapy and presents with a medical diagnosis of   Encounter Diagnoses   Name Primary?    Generalized weakness     Decreased range of motion (ROM) of shoulder     Decreased range of motion (ROM) of knee     and demonstrates limitations as described in the problem list.   Pt will continue to benefit from skilled outpatient physical therapy to address the deficits listed in the problem list, provide pt/family education and to maximize pt's level of independence in the home and community environment.     Short Term GOALS: 4 weeks. Pt agrees with goals set.  1. Patient demonstrates independence with HEP.   2. Patient demonstrates independence with Postural Awareness.   3. Patient demonstrates independence with body mechanics.      Long Term GOALS: 8 weeks. Pt agrees with goals set.  1. Patient demonstrates increased ROM by 5-10 degrees each to improve tolerance to functional activities pain free.   2. Patient demonstrates increased strength BLE's to 4/5 or greater to improve tolerance to functional activities pain free.   3. Patient demonstrates improved overall function per FOTO Knee Survey to 55% Limitation or less.       Plan     Continue with established Plan of Care towards PT goals.    Therapist: Bobby Mock, PTA  4/21/2017

## 2017-04-25 ENCOUNTER — CLINICAL SUPPORT (OUTPATIENT)
Dept: REHABILITATION | Facility: HOSPITAL | Age: 62
End: 2017-04-25
Attending: ORTHOPAEDIC SURGERY
Payer: COMMERCIAL

## 2017-04-25 DIAGNOSIS — R53.1 GENERALIZED WEAKNESS: ICD-10-CM

## 2017-04-25 DIAGNOSIS — M25.669 DECREASED RANGE OF MOTION (ROM) OF KNEE: ICD-10-CM

## 2017-04-25 DIAGNOSIS — M25.619 DECREASED RANGE OF MOTION (ROM) OF SHOULDER: ICD-10-CM

## 2017-04-25 PROCEDURE — 97110 THERAPEUTIC EXERCISES: CPT | Mod: PO

## 2017-04-25 NOTE — PROGRESS NOTES
Physical Therapy Daily Note     Name: Kylie Barakatford  Clinic Number: [id number]  Diagnosis:   No diagnosis found.  Physician: Kvng Contreras MD  Precautions: fall  Evaluation Date: 4/6/17   Visit # authorized: 3/20  Authorization period: 12/31/17  Plan of care Expiration: 6/6/17  MD referral: y   Time In: 10:00 am  Time Out: 11:00 am   Treatment time: 45  Billable time: 30 min    Subjective     Pt reports: The pt reports that she had a lot of soreness following the last session.  She also notes that she has increased pain in her low back and shoulders (primarily her L shoulder where she had the breast reconstruction).  She notes that her pain and soreness is about an 8 but she does not want to stop.  She really feels that she was so sore because she has not done any physical activity in many years.  She notes that she is excited about the potential progress.    Pain Scale: Kylie rates pain on a scale of 0-10 to be 8 currently.    Objective    Patient to dept Amb Mod Independently with a rollator walker.     Kylie received individual therapeutic exercises to develop LE/UE strength, ROM and flexibility for 45 minutes including:   Supine AAROM Heel slides on (R) for knee flex stretch 2 x 10 reps.  QS x 20  GS with TA isometric x 20 3 sec hold   Hooklying Hip Abd/ER with OTB 3 x 10  Hooklying Hip add Isometric 2 x 10  SAQ 2 x 10 B  AAROM shoulder flex with dowel 2 x 10 hooklying   LAQ 2 x 10  Seated hip flex 2 x 10  Repeated sit<->stand from mat 1 x 5 trials ( Min cues for eccentric control when sitting)-np today   AAROM Shoulder flex on pulleys 2 x 10  (B) shoulder er ( sitting)  No resistance 2 x 10    Patient receives a cold pack applied to (R) shoulder x 10 minutes for pain control. ( Declined application of cold pack to knee)     Patient education: Patient educated in and provided with an updated copy of HEP to include: QS/GS, Hooklying hip abd/ER with GTB,  Hooklying hip add isometric, Heel slides, AAROM shoulder flex with dowel, LAQ. She was educated to avoid exacerbation of pain with ex's which she voices understanding and compliance.  No spiritual or educational barriers to learning     Assessment     Patient tolerated treatment fairly but did have to limit exercises from last session as she was still sore from las treatment session. The pt does demo mild improvement in gait as evidenced by improved trunk extension and increased alice with walking.  The pt also demos mild improvement in bed mobility during treatment and required less assistance for supine to sit.  Will continue to focus on improved endurance and functional strength.   This is a 61 y.o. female referred to outpatient physical therapy and presents with a medical diagnosis of   Encounter Diagnoses   Name Primary?    Generalized weakness     Decreased range of motion (ROM) of shoulder     Decreased range of motion (ROM) of knee     and demonstrates limitations as described in the problem list.   Pt will continue to benefit from skilled outpatient physical therapy to address the deficits listed in the problem list, provide pt/family education and to maximize pt's level of independence in the home and community environment.     Short Term GOALS: 4 weeks. Pt agrees with goals set.  1. Patient demonstrates independence with HEP.   2. Patient demonstrates independence with Postural Awareness.   3. Patient demonstrates independence with body mechanics.      Long Term GOALS: 8 weeks. Pt agrees with goals set.  1. Patient demonstrates increased ROM by 5-10 degrees each to improve tolerance to functional activities pain free.   2. Patient demonstrates increased strength BLE's to 4/5 or greater to improve tolerance to functional activities pain free.   3. Patient demonstrates improved overall function per FOTO Knee Survey to 55% Limitation or less.       Plan     Continue with established Plan of Care towards  PT goals.    Therapist: Padmaja Reagan, PT  4/25/2017

## 2017-05-02 ENCOUNTER — CLINICAL SUPPORT (OUTPATIENT)
Dept: REHABILITATION | Facility: HOSPITAL | Age: 62
End: 2017-05-02
Attending: ORTHOPAEDIC SURGERY
Payer: COMMERCIAL

## 2017-05-02 DIAGNOSIS — R53.1 GENERALIZED WEAKNESS: ICD-10-CM

## 2017-05-02 DIAGNOSIS — M25.619 DECREASED RANGE OF MOTION (ROM) OF SHOULDER: ICD-10-CM

## 2017-05-02 DIAGNOSIS — M25.669 DECREASED RANGE OF MOTION (ROM) OF KNEE: ICD-10-CM

## 2017-05-02 PROCEDURE — 97110 THERAPEUTIC EXERCISES: CPT | Mod: PO

## 2017-05-10 NOTE — PROGRESS NOTES
Physical Therapy Daily Note     Name: Kylie Barakatford  Clinic Number: [id number]  Diagnosis:   No diagnosis found.  Physician: Kvng Contreras MD  Precautions: fall  Evaluation Date: 4/6/17   Visit # authorized: 3/20  Authorization period: 12/31/17  Plan of care Expiration: 6/6/17  MD referral: y   Time In: 11:00 am  Time Out: 11:50 am   Treatment time: 50  Billable time: 30 min    Subjective     Pt reports: The pt reports that she is not as sore as she was last session, but she is still sore.  She notes that she is currently having a lot of R knee pain and low back pain.    Pain Scale: Kylie rates pain on a scale of 0-10 to be 8-9 currently.    Objective    Patient to dept Amb Mod Independently with a rollator walker.     Kylie received individual therapeutic exercises to develop LE/UE strength, ROM and flexibility for 50 minutes including:   Supine AAROM Heel slides on (R) for knee flex stretch 2 x 10 reps.  QS x 20  GS with TA isometric x 20 3 sec hold   Hooklying Hip Abd/ER with OTB 3 x 10  Hooklying Hip add Isometric 2 x 10  SAQ 2 x 10 B-min A R today  AAROM shoulder flex with dowel 2 x 10 hooklying   LAQ 2 x 10  Seated hip flex 2 x 10  Repeated sit<->stand from mat 1 x 5 trials ( Min cues for eccentric control when sitting)  AAROM Shoulder flex on pulleys 3 x 10  (B) shoulder er ( sitting)  No resistance 2 x 10    Patient receives a cold pack applied to (R) shoulder x 10 minutes for pain control. ( Declined application of cold pack to knee)     Patient education: Patient educated in and provided with an updated copy of HEP to include: QS/GS, Hooklying hip abd/ER with GTB, Hooklying hip add isometric, Heel slides, AAROM shoulder flex with dowel, LAQ. She was educated to avoid exacerbation of pain with ex's which she voices understanding and compliance.  No spiritual or educational barriers to learning     Assessment     The pt tolerated all treatment fairly.   The pt still with limited endurance and strength.  The pt would continue to benefit from continued skilled therapy to improve functional strength and mobility.    This is a 61 y.o. female referred to outpatient physical therapy and presents with a medical diagnosis of   Encounter Diagnoses   Name Primary?    Generalized weakness     Decreased range of motion (ROM) of shoulder     Decreased range of motion (ROM) of knee     and demonstrates limitations as described in the problem list.   Pt will continue to benefit from skilled outpatient physical therapy to address the deficits listed in the problem list, provide pt/family education and to maximize pt's level of independence in the home and community environment.     Short Term GOALS: 4 weeks. Pt agrees with goals set.  1. Patient demonstrates independence with HEP.   2. Patient demonstrates independence with Postural Awareness.   3. Patient demonstrates independence with body mechanics.      Long Term GOALS: 8 weeks. Pt agrees with goals set.  1. Patient demonstrates increased ROM by 5-10 degrees each to improve tolerance to functional activities pain free.   2. Patient demonstrates increased strength BLE's to 4/5 or greater to improve tolerance to functional activities pain free.   3. Patient demonstrates improved overall function per FOTO Knee Survey to 55% Limitation or less.       Plan     Continue with established Plan of Care towards PT goals.    Therapist: Padmaja Reagan, PT  5/9/2017

## 2017-05-23 DIAGNOSIS — Q06.8 TETHERED CORD: ICD-10-CM

## 2017-05-23 DIAGNOSIS — M43.10 ACQUIRED SPONDYLOLISTHESIS: ICD-10-CM

## 2017-05-23 DIAGNOSIS — M51.37 DDD (DEGENERATIVE DISC DISEASE), LUMBOSACRAL: ICD-10-CM

## 2017-05-23 DIAGNOSIS — M54.50 BILATERAL LOW BACK PAIN WITHOUT SCIATICA, UNSPECIFIED CHRONICITY: ICD-10-CM

## 2017-05-23 DIAGNOSIS — M54.14 THORACIC AND LUMBOSACRAL NEURITIS: ICD-10-CM

## 2017-05-23 DIAGNOSIS — M54.17 THORACIC AND LUMBOSACRAL NEURITIS: ICD-10-CM

## 2017-05-23 DIAGNOSIS — M47.819 SPONDYLOSIS WITHOUT MYELOPATHY: ICD-10-CM

## 2017-05-23 DIAGNOSIS — M48.062 SPINAL STENOSIS OF LUMBAR REGION WITH NEUROGENIC CLAUDICATION: ICD-10-CM

## 2017-05-24 RX ORDER — GABAPENTIN 400 MG/1
CAPSULE ORAL
Qty: 180 CAPSULE | Refills: 3 | Status: SHIPPED | OUTPATIENT
Start: 2017-05-24 | End: 2017-08-30 | Stop reason: SDUPTHER

## 2017-05-31 ENCOUNTER — OFFICE VISIT (OUTPATIENT)
Dept: PODIATRY | Facility: CLINIC | Age: 62
End: 2017-05-31
Payer: COMMERCIAL

## 2017-05-31 VITALS — HEIGHT: 64 IN | HEART RATE: 72 BPM | SYSTOLIC BLOOD PRESSURE: 165 MMHG | DIASTOLIC BLOOD PRESSURE: 83 MMHG

## 2017-05-31 DIAGNOSIS — L84 CORN OR CALLUS: ICD-10-CM

## 2017-05-31 DIAGNOSIS — M20.41 HAMMER TOES OF BOTH FEET: ICD-10-CM

## 2017-05-31 DIAGNOSIS — E11.49 TYPE II DIABETES MELLITUS WITH NEUROLOGICAL MANIFESTATIONS: Primary | ICD-10-CM

## 2017-05-31 DIAGNOSIS — Z89.429 S/P AMPUTATION OF LESSER TOE, UNSPECIFIED LATERALITY: ICD-10-CM

## 2017-05-31 DIAGNOSIS — B35.1 ONYCHOMYCOSIS DUE TO DERMATOPHYTE: ICD-10-CM

## 2017-05-31 DIAGNOSIS — E11.9 COMPREHENSIVE DIABETIC FOOT EXAMINATION, TYPE 2 DM, ENCOUNTER FOR: ICD-10-CM

## 2017-05-31 DIAGNOSIS — M20.42 HAMMER TOES OF BOTH FEET: ICD-10-CM

## 2017-05-31 PROCEDURE — 3046F HEMOGLOBIN A1C LEVEL >9.0%: CPT | Mod: S$GLB,,, | Performed by: PODIATRIST

## 2017-05-31 PROCEDURE — 11721 DEBRIDE NAIL 6 OR MORE: CPT | Mod: 59,S$GLB,, | Performed by: PODIATRIST

## 2017-05-31 PROCEDURE — 99213 OFFICE O/P EST LOW 20 MIN: CPT | Mod: 25,S$GLB,, | Performed by: PODIATRIST

## 2017-05-31 PROCEDURE — 4010F ACE/ARB THERAPY RXD/TAKEN: CPT | Mod: S$GLB,,, | Performed by: PODIATRIST

## 2017-05-31 PROCEDURE — 99999 PR PBB SHADOW E&M-EST. PATIENT-LVL III: CPT | Mod: PBBFAC,,, | Performed by: PODIATRIST

## 2017-05-31 PROCEDURE — 11057 PARNG/CUTG B9 HYPRKR LES >4: CPT | Mod: S$GLB,,, | Performed by: PODIATRIST

## 2017-05-31 NOTE — PATIENT INSTRUCTIONS
Diabetes: Inspecting Your Feet    Diabetes increases your chances of developing foot problems. So inspect your feet every day. This helps you find small skin irritations before they become serious ulcers or infections. If you have trouble seeing the bottoms of your feet, use a mirror or ask a family member or friend to help.  How to check your feet  Below are tips to help you look for foot problems. Try to check your feet at the same time each day, such as when you get out of bed in the morning:  · Check the top of each foot. The tops of toes, back of the heel, and outer edge of the foot can get a lot of rubbing from poor-fitting shoes.  · Check the bottom of each foot. Daily wear and tear often leads to problems at pressure spots.  · Check the toes and nails. Fungal infections often occur between toes. Toenail problems can also be a sign of fungal infections or lead to breaks in the skin.  · Check your shoes, too. Loose objects inside a shoe can injure the foot. Use your hand to feel inside your shoes for things like marco, loose stitching, or rough areas that could irritate your skin.  Warning signs  Look for any color changes in the foot. Redness with streaks can signal a severe infection, which needs immediate medical attention. Tell your healthcare provider right away if you have any of these problems:  · Swelling, sometimes with color changes, may be a sign of poor blood flow or infection. Symptoms include tenderness and an increase in the size of your foot.  · Warm or hot areas on your feet may be signs of infection. A foot that is cold may not be getting enough blood.  · Sensations such as burning, tingling, or pins and needles can be signs of a problem. Also check for areas that may be numb.  · Hot spots are caused by friction or pressure. Look for hot spots in areas that get a lot of rubbing. Hot spots can turn into blisters, calluses, or sores.  · Cracks and sores are caused by dry or irritated  skin. They are a sign that the skin is breaking down, which can lead to infection.  · Toenail problems to watch for include nails growing into the skin (ingrown toenail) and causing redness or pain. Thick, yellow, or discolored nails can signal a fungal infection.  · Drainage and odor can develop from untreated sores and ulcers. Call your healthcare provider right away if you notice white or yellow drainage, bleeding, or unpleasant odor.   Date Last Reviewed: 6/1/2016 © 2000-2016 [employer]. [address] [place] [address]. All rights reserved. This information is not intended as a substitute for professional medical care. Always follow your healthcare professional's instructions.

## 2017-05-31 NOTE — PROGRESS NOTES
Subjective:    Patient ID: Kylie King is a 61 y.o. female.    Chief Complaint: Diabetes Mellitus (PCP Dr. Foote 3/17/17); Diabetic Foot Exam; Routine Foot Care; and Peripheral Neuropathy      HPI:     Pt presents for DM foot exam  She is s/p L 3rd toe distal phalanx amputation for osteomyelitis last november  Pt reports painfull nails  Pt has no other complaints    This patient has documented high risk feet requiring routine maintenance secondary to diabetes mellitis and those secondary complications of diabetes, as mentioned.    PCP: Virginia Foote MD    Date Last Seen by PCP: lang  Current shoe gear:  Affected Foot: Extra depth shoes with custome accommodative insoles       Hemoglobin A1C   Date Value Ref Range Status   03/17/2017 10.4 (H) 4.5 - 6.2 % Final     Comment:     According to ADA guidelines, hemoglobin A1C <7.0% represents  optimal control in non-pregnant diabetic patients.  Different  metrics may apply to specific populations.   Standards of Medical Care in Diabetes - 2016.  For the purpose of screening for the presence of diabetes:  <5.7%     Consistent with the absence of diabetes  5.7-6.4%  Consistent with increasing risk for diabetes   (prediabetes)  >or=6.5%  Consistent with diabetes  Currently no consensus exists for use of hemoglobin A1C  for diagnosis of diabetes for children.     05/17/2016 11.4 (H) 4.5 - 6.2 % Final   12/29/2015 12.4 (H) 4.5 - 6.2 % Final         Past Medical History:   Diagnosis Date    Anxiety     Breast cancer 1/2013    left breast- invasive mammary carcinoma, ER/AR positive, Her2 negative    Choledocholithiasis     s/p ERCP and stent placement    Colon cancer 2001    CRI (chronic renal insufficiency)     one kidney    GERD (gastroesophageal reflux disease)     History of acute pancreatitis 2009 2/09 s/p sphincterotomy    History of colon cancer     s/p sigmoid colectomy    HTN (hypertension), benign     Hyperlipidemia     Intracerebral  "hemorrhage     Kidney stone     left    Obesity     Pancreatitis     Pancreatitis 2008    Stroke 12/2012    Tobacco abuse     Type 2 diabetes mellitus with neurological manifestations, controlled      Past Surgical History:   Procedure Laterality Date    BREAST BIOPSY  1/2012    left breast invasive mammary carcinoma (lateral bx) and intraductal papilloma (medial bx)    BREAST LUMPECTOMY  1999    right breast- benign fibroadenoma    BREAST RECONSTRUCTION  2013    CHOLECYSTECTOMY  2001    COLON SURGERY      HERNIA REPAIR      left nephrectomy      atrophic kidney and hydronephrosis    left oopherectomy  2001    MASTECTOMY  2013    left    NEPHRECTOMY  2011    lap    SIGMOIDECTOMY  2001     Social History     Social History    Marital status:      Spouse name: N/A    Number of children: 2    Years of education: N/A     Occupational History    not working [employer]     Social History Main Topics    Smoking status: Current Every Day Smoker     Packs/day: 0.50     Types: Cigarettes    Smokeless tobacco: Never Used      Comment: anxious    Alcohol use No    Drug use: No    Sexual activity: No     Other Topics Concern    Not on file     Social History Narrative    She is not exercising regularly         Current Outpatient Prescriptions:     acetaminophen-codeine 300-30mg (TYLENOL-CODEINE #3) 300-30 mg Tab, Take 1 tablet by mouth every 6 (six) hours as needed., Disp: 90 tablet, Rfl: 2    amlodipine (NORVASC) 10 MG tablet, TAKE 1 TABLET EVERY EVENING, Disp: 90 tablet, Rfl: 3    BD INSULIN PEN NEEDLE UF MINI 31 gauge x 3/16" Ndle, USE 5 NEEDLES PER DAY, Disp: 450 each, Rfl: 2    blood sugar diagnostic (ONETOUCH ULTRA TEST) Strp, 1 strip by Misc.(Non-Drug; Combo Route) route 4 (four) times daily before meals and nightly., Disp: 400 strip, Rfl: 4    carvedilol (COREG) 25 MG tablet, Take 1 tablet (25 mg total) by mouth 2 (two) times daily., Disp: 180 tablet, Rfl: 4    citalopram " "(CELEXA) 20 MG tablet, TAKE 1 TABLET NIGHTLY, Disp: 90 tablet, Rfl: 3    doxycycline (VIBRA-TABS) 100 MG tablet, Take 1 tablet (100 mg total) by mouth 2 (two) times daily. Remain upright for 60 minutes after each dose., Disp: 28 tablet, Rfl: 3    furosemide (LASIX) 40 MG tablet, Take 1 tablet (40 mg total) by mouth once daily., Disp: 90 tablet, Rfl: 4    gabapentin (NEURONTIN) 400 MG capsule, TAKE 1 CAPSULE TWICE A DAY, Disp: 180 capsule, Rfl: 3    HUMALOG KWIKPEN 100 unit/mL InPn pen, BLOOD GLUCOSE 150-200, INJECT 15 UNITS UNDER THE SKIN, 201-250, 18 UNITS, 251-300, 20 UNITS THREE TIMES A DAY AS DIRECTED, Disp: 45 mL, Rfl: 3    hydrALAZINE (APRESOLINE) 50 MG tablet, TAKE 1 TABLET EVERY 8 HOURS, Disp: 270 tablet, Rfl: 3    insulin needles, disposable, (PEN NEEDLE, DIABETIC) 31 Ndle, Patient needs 5 needles a day, Disp: 500 each, Rfl: 3    insulin needles, disposable, 32 x 5/32 " Ndle, 1 Device by Misc.(Non-Drug; Combo Route) route 5 (five) times daily., Disp: 400 each, Rfl: 6    lancets (ONETOUCH DELICA LANCETS) 33 gauge Misc, 1 lancet by Misc.(Non-Drug; Combo Route) route 4 (four) times daily before meals and nightly., Disp: 400 each, Rfl: 4    letrozole (FEMARA) 2.5 mg Tab, Take 1 tablet (2.5 mg total) by mouth every evening., Disp: 90 tablet, Rfl: 3    LEVEMIR FLEXTOUCH 100 unit/mL (3 mL) InPn pen, INJECT 40 UNITS UNDER THE SKIN TWICE A DAY, Disp: 75 mL, Rfl: 3    lisinopril (PRINIVIL,ZESTRIL) 40 MG tablet, TAKE 1 TABLET DAILY, Disp: 90 tablet, Rfl: 3    pravastatin (PRAVACHOL) 40 MG tablet, TAKE 1 TABLET EVERY EVENING, Disp: 90 tablet, Rfl: 3    ranitidine (ZANTAC) 150 MG capsule, TAKE 1 CAPSULE NIGHTLY, Disp: 90 capsule, Rfl: 3    oxybutynin (DITROPAN XL) 15 MG TR24, Take 1 tablet (15 mg total) by mouth once daily., Disp: 90 tablet, Rfl: 4    Current Facility-Administered Medications:     cefTRIAXone injection 2 g, 2 g, Intramuscular, Once, Spencer Roldan MD    lidocaine HCL 10 mg/ml (1%) " "injection 1 mL, 1 mL, Intramuscular, Once, Spencer Roldan MD  Review of patient's allergies indicates:   Allergen Reactions    Cyclobenzaprine Other (See Comments)     Patient stated causes hallucinations.     Erythromycin      Other reaction(s): Stomach upset    Keflex [cephalexin]      Yeast infection       BP (!) 165/83   Pulse 72   Ht 5' 4" (1.626 m)     ROS    Vascular: positive for edema  Musculoskeletal: positive for joint pain, joint edema  Skin: positive for lesions  Neurological: positive for burning, tingling, numbness  Gastrointestinal: negative for stomach pain, nausea and vomiting    ROS Constitutional:  General Appearance: well nourished        Objective:        Ortho/SPM Exam  Physical Exam    V: DP 1/4, PT 1/4, CRT< 3s to all digits tested.     N: Sensation diminished to all distributions    Derm: skin intact. + hyperkeratotic lesion at tip of toes 1,4,5 b/l. Nails mycotic and dystrophic x 9    Ortho:  +Motor EHL/FHL/TA/GA   L 3rd toe amp at dipj noted -  Well healed   Compartments soft/compressible. No pain on passive stretch of big toe. No calf  pain.          Assessment:     Imagin. Type II diabetes mellitus with neurological manifestations    2. Corn or callus    3. Onychomycosis due to dermatophyte    4. Comprehensive diabetic foot examination, type 2 DM, encounter for    5. S/P amputation of lesser toe, unspecified laterality - Left Foot    6. Hammer toes of both feet          Plan:       Orders Placed This Encounter    Foot Care    DIABETIC SHOES FOR HOME USE     .   Kylie was seen today for diabetes mellitus, diabetic foot exam, routine foot care and peripheral neuropathy.    Diagnoses and all orders for this visit:    Type II diabetes mellitus with neurological manifestations  -     Foot Care  -     DIABETIC SHOES FOR HOME USE    Corn or callus  -     DIABETIC SHOES FOR HOME USE    Onychomycosis due to dermatophyte    Comprehensive diabetic foot examination, type 2 " DM, encounter for    S/P amputation of lesser toe, unspecified laterality - Left Foot  -     DIABETIC SHOES FOR HOME USE    Hammer toes of both feet  -     DIABETIC SHOES FOR HOME USE    Routine Foot Care  Date/Time: 5/31/2017 10:44 AM  Performed by: СВЕТЛАНА TURPIN JR.  Authorized by: СВЕТЛАНА TURPIN JR.     Hyperkeratotic Skin Lesions?: Yes    Number of trimmed lesions:  6  Location(s):  Left 1st Toe, Left 4th Toe, Left 5th Toe, Right 1st Toe, Right 4th Toe and Right 5th Toe    Nail Care Type:  Debride  Patient tolerance:  Patient tolerated the procedure well with no immediate complications        Kylie King is a 61 y.o. female presenting w/   1. Type II diabetes mellitus with neurological manifestations    2. Corn or callus    3. Onychomycosis due to dermatophyte    4. Comprehensive diabetic foot examination, type 2 DM, encounter for    5. S/P amputation of lesser toe, unspecified laterality - Left Foot    6. Hammer toes of both feet      ADA Risk Classification: Hx of ulcer or amputation - 3: rtc 1-2 months    -pt seen, evaluated, and managed  -dx discussed in detail. All questions/concerns addressed  -xr and jerad at nxt visit  -procedures as above  -educated on DM footcare    Return in about 2 months (around 7/31/2017).

## 2017-06-29 DIAGNOSIS — N18.30 CHRONIC KIDNEY DISEASE, STAGE III (MODERATE): Primary | ICD-10-CM

## 2017-07-13 ENCOUNTER — TELEPHONE (OUTPATIENT)
Dept: SURGERY | Facility: CLINIC | Age: 62
End: 2017-07-13

## 2017-07-13 DIAGNOSIS — Z85.038 HISTORY OF COLON CANCER: Primary | ICD-10-CM

## 2017-07-13 NOTE — TELEPHONE ENCOUNTER
----- Message from Noreen Blancas sent at 7/13/2017  1:49 PM CDT -----  Contact: Self- [phone]  [place]- pt is returning a missed call to Penelope regarding her upcoming appt tomorrow- please call pt back at [phone]

## 2017-07-14 ENCOUNTER — LAB VISIT (OUTPATIENT)
Dept: LAB | Facility: OTHER | Age: 62
End: 2017-07-14
Attending: INTERNAL MEDICINE
Payer: COMMERCIAL

## 2017-07-14 DIAGNOSIS — N18.30 CHRONIC KIDNEY DISEASE, STAGE III (MODERATE): ICD-10-CM

## 2017-07-14 LAB
ALBUMIN SERPL BCP-MCNC: 3 G/DL
ANION GAP SERPL CALC-SCNC: 10 MMOL/L
BUN SERPL-MCNC: 32 MG/DL
CALCIUM SERPL-MCNC: 9.4 MG/DL
CHLORIDE SERPL-SCNC: 106 MMOL/L
CO2 SERPL-SCNC: 24 MMOL/L
CREAT SERPL-MCNC: 1.9 MG/DL
EST. GFR  (AFRICAN AMERICAN): 32 ML/MIN/1.73 M^2
EST. GFR  (NON AFRICAN AMERICAN): 28 ML/MIN/1.73 M^2
GLUCOSE SERPL-MCNC: 318 MG/DL
PHOSPHATE SERPL-MCNC: 3.4 MG/DL
POTASSIUM SERPL-SCNC: 3.9 MMOL/L
SODIUM SERPL-SCNC: 140 MMOL/L

## 2017-07-14 PROCEDURE — 80069 RENAL FUNCTION PANEL: CPT

## 2017-07-14 PROCEDURE — 36415 COLL VENOUS BLD VENIPUNCTURE: CPT

## 2017-07-29 ENCOUNTER — OFFICE VISIT (OUTPATIENT)
Dept: NEPHROLOGY | Facility: CLINIC | Age: 62
End: 2017-07-29
Payer: COMMERCIAL

## 2017-07-29 VITALS
DIASTOLIC BLOOD PRESSURE: 68 MMHG | WEIGHT: 278.44 LBS | HEART RATE: 77 BPM | HEIGHT: 64 IN | OXYGEN SATURATION: 96 % | BODY MASS INDEX: 47.54 KG/M2 | SYSTOLIC BLOOD PRESSURE: 150 MMHG

## 2017-07-29 DIAGNOSIS — E66.01 MORBID OBESITY, UNSPECIFIED OBESITY TYPE: ICD-10-CM

## 2017-07-29 DIAGNOSIS — Z85.3 HX: BREAST CANCER: ICD-10-CM

## 2017-07-29 DIAGNOSIS — E11.21 DIABETIC NEPHROPATHY ASSOCIATED WITH TYPE 2 DIABETES MELLITUS: ICD-10-CM

## 2017-07-29 DIAGNOSIS — R80.9 PROTEINURIA, UNSPECIFIED TYPE: ICD-10-CM

## 2017-07-29 DIAGNOSIS — N18.4 CKD (CHRONIC KIDNEY DISEASE), STAGE IV: Primary | ICD-10-CM

## 2017-07-29 PROCEDURE — 99213 OFFICE O/P EST LOW 20 MIN: CPT | Mod: S$GLB,,, | Performed by: INTERNAL MEDICINE

## 2017-07-29 PROCEDURE — 99999 PR PBB SHADOW E&M-EST. PATIENT-LVL IV: CPT | Mod: PBBFAC,,, | Performed by: INTERNAL MEDICINE

## 2017-07-29 NOTE — PROGRESS NOTES
Patient is here today for folow up evaluation of CKD IV. Last seen in renal office 11/26/14. Baseline Cr; 1.5-1.8 mg/dl Her most recent lab(7/14/19) Cr 1.9 mg/dl; eGFR; 28 ml/min; potassium; 3.9 mmol/L There is a hx of diabetes complicated by nephropathy; in a solitary kidney; last A1c; 10.4%, She is morbidly obese; hx of breast C A. Today she has no new complaintgs    ROS;   Mood and Affect; Appropriate  otherwise non-contributory    Exam  Morbidly obese woman; no acute distress; oriented x 3  HEENT; Grossly intact  CHEST; Clear P&A  HEART; RR; S1&S2 no murmur rub gallop  ABD; Obese non-tender  EXT; (-)Edema    Impression  CKD IV Poor renal prognosis; Solitary kidney; diabetes not well controlled; proteinruria    Plan  Return Visit; 6 mo

## 2017-08-30 ENCOUNTER — HOSPITAL ENCOUNTER (OUTPATIENT)
Dept: RADIOLOGY | Facility: HOSPITAL | Age: 62
Discharge: HOME | End: 2017-08-30
Attending: PODIATRIST
Payer: COMMERCIAL

## 2017-08-30 ENCOUNTER — OFFICE VISIT (OUTPATIENT)
Dept: INTERNAL MEDICINE | Facility: CLINIC | Age: 62
End: 2017-08-30
Payer: COMMERCIAL

## 2017-08-30 ENCOUNTER — OFFICE VISIT (OUTPATIENT)
Dept: PODIATRY | Facility: CLINIC | Age: 62
End: 2017-08-30
Payer: COMMERCIAL

## 2017-08-30 ENCOUNTER — OFFICE VISIT (OUTPATIENT)
Dept: OBSTETRICS AND GYNECOLOGY | Facility: CLINIC | Age: 62
End: 2017-08-30
Payer: COMMERCIAL

## 2017-08-30 VITALS
BODY MASS INDEX: 48.21 KG/M2 | HEIGHT: 65 IN | HEART RATE: 67 BPM | SYSTOLIC BLOOD PRESSURE: 161 MMHG | DIASTOLIC BLOOD PRESSURE: 81 MMHG

## 2017-08-30 VITALS
BODY MASS INDEX: 47.53 KG/M2 | DIASTOLIC BLOOD PRESSURE: 90 MMHG | SYSTOLIC BLOOD PRESSURE: 156 MMHG | WEIGHT: 285.25 LBS | HEART RATE: 66 BPM | HEIGHT: 65 IN | TEMPERATURE: 99 F

## 2017-08-30 VITALS
HEIGHT: 64 IN | WEIGHT: 285.25 LBS | DIASTOLIC BLOOD PRESSURE: 90 MMHG | BODY MASS INDEX: 48.7 KG/M2 | SYSTOLIC BLOOD PRESSURE: 130 MMHG

## 2017-08-30 DIAGNOSIS — N18.30 CKD (CHRONIC KIDNEY DISEASE) STAGE 3, GFR 30-59 ML/MIN: ICD-10-CM

## 2017-08-30 DIAGNOSIS — I73.9 PVD (PERIPHERAL VASCULAR DISEASE): ICD-10-CM

## 2017-08-30 DIAGNOSIS — Z85.038 HISTORY OF COLON CANCER: ICD-10-CM

## 2017-08-30 DIAGNOSIS — M54.50 BILATERAL LOW BACK PAIN WITHOUT SCIATICA, UNSPECIFIED CHRONICITY: ICD-10-CM

## 2017-08-30 DIAGNOSIS — M79.621 AXILLARY PAIN, RIGHT: ICD-10-CM

## 2017-08-30 DIAGNOSIS — M47.819 SPONDYLOSIS WITHOUT MYELOPATHY: ICD-10-CM

## 2017-08-30 DIAGNOSIS — G89.29 CHRONIC PAIN OF RIGHT KNEE: ICD-10-CM

## 2017-08-30 DIAGNOSIS — M54.17 THORACIC AND LUMBOSACRAL NEURITIS: ICD-10-CM

## 2017-08-30 DIAGNOSIS — B35.1 ONYCHOMYCOSIS DUE TO DERMATOPHYTE: ICD-10-CM

## 2017-08-30 DIAGNOSIS — M48.061 SPINAL STENOSIS, LUMBAR: ICD-10-CM

## 2017-08-30 DIAGNOSIS — Z85.3 HISTORY OF BREAST CANCER IN FEMALE: ICD-10-CM

## 2017-08-30 DIAGNOSIS — M51.37 DDD (DEGENERATIVE DISC DISEASE), LUMBOSACRAL: ICD-10-CM

## 2017-08-30 DIAGNOSIS — E66.01 MORBID OBESITY WITH BMI OF 45.0-49.9, ADULT: ICD-10-CM

## 2017-08-30 DIAGNOSIS — M48.062 SPINAL STENOSIS OF LUMBAR REGION WITH NEUROGENIC CLAUDICATION: ICD-10-CM

## 2017-08-30 DIAGNOSIS — L03.115 CELLULITIS OF RIGHT LOWER EXTREMITY: ICD-10-CM

## 2017-08-30 DIAGNOSIS — Z89.429 S/P AMPUTATION OF LESSER TOE, UNSPECIFIED LATERALITY: ICD-10-CM

## 2017-08-30 DIAGNOSIS — M54.14 THORACIC AND LUMBOSACRAL NEURITIS: ICD-10-CM

## 2017-08-30 DIAGNOSIS — E11.42 DIABETIC PERIPHERAL NEUROPATHY ASSOCIATED WITH TYPE 2 DIABETES MELLITUS: ICD-10-CM

## 2017-08-30 DIAGNOSIS — M25.511 CHRONIC RIGHT SHOULDER PAIN: ICD-10-CM

## 2017-08-30 DIAGNOSIS — M43.10 ACQUIRED SPONDYLOLISTHESIS: ICD-10-CM

## 2017-08-30 DIAGNOSIS — R32 ENURESIS: ICD-10-CM

## 2017-08-30 DIAGNOSIS — G89.29 CHRONIC RIGHT SHOULDER PAIN: ICD-10-CM

## 2017-08-30 DIAGNOSIS — I10 ESSENTIAL HYPERTENSION: Primary | ICD-10-CM

## 2017-08-30 DIAGNOSIS — Z01.411 ENCOUNTER FOR GYNECOLOGICAL EXAMINATION WITH ABNORMAL FINDING: Primary | ICD-10-CM

## 2017-08-30 DIAGNOSIS — R40.0 SOMNOLENCE: ICD-10-CM

## 2017-08-30 DIAGNOSIS — M25.561 CHRONIC PAIN OF RIGHT KNEE: ICD-10-CM

## 2017-08-30 DIAGNOSIS — Q06.8 TETHERED CORD: ICD-10-CM

## 2017-08-30 DIAGNOSIS — Z85.3 HISTORY OF BREAST CANCER: ICD-10-CM

## 2017-08-30 PROCEDURE — 3079F DIAST BP 80-89 MM HG: CPT | Mod: S$GLB,,, | Performed by: PODIATRIST

## 2017-08-30 PROCEDURE — 3046F HEMOGLOBIN A1C LEVEL >9.0%: CPT | Mod: S$GLB,,, | Performed by: PODIATRIST

## 2017-08-30 PROCEDURE — 3008F BODY MASS INDEX DOCD: CPT | Mod: S$GLB,,, | Performed by: PODIATRIST

## 2017-08-30 PROCEDURE — 11721 DEBRIDE NAIL 6 OR MORE: CPT | Mod: S$GLB,,, | Performed by: PODIATRIST

## 2017-08-30 PROCEDURE — 73630 X-RAY EXAM OF FOOT: CPT | Mod: TC,PO,LT

## 2017-08-30 PROCEDURE — 99999 PR PBB SHADOW E&M-EST. PATIENT-LVL IV: CPT | Mod: PBBFAC,,, | Performed by: ADVANCED PRACTICE MIDWIFE

## 2017-08-30 PROCEDURE — 99386 PREV VISIT NEW AGE 40-64: CPT | Mod: S$GLB,,, | Performed by: ADVANCED PRACTICE MIDWIFE

## 2017-08-30 PROCEDURE — 99214 OFFICE O/P EST MOD 30 MIN: CPT | Mod: S$GLB,,, | Performed by: INTERNAL MEDICINE

## 2017-08-30 PROCEDURE — 3078F DIAST BP <80 MM HG: CPT | Mod: S$GLB,,, | Performed by: INTERNAL MEDICINE

## 2017-08-30 PROCEDURE — 3077F SYST BP >= 140 MM HG: CPT | Mod: S$GLB,,, | Performed by: PODIATRIST

## 2017-08-30 PROCEDURE — 3066F NEPHROPATHY DOC TX: CPT | Mod: S$GLB,,, | Performed by: INTERNAL MEDICINE

## 2017-08-30 PROCEDURE — 3066F NEPHROPATHY DOC TX: CPT | Mod: S$GLB,,, | Performed by: PODIATRIST

## 2017-08-30 PROCEDURE — 88175 CYTOPATH C/V AUTO FLUID REDO: CPT

## 2017-08-30 PROCEDURE — 99999 PR PBB SHADOW E&M-EST. PATIENT-LVL IV: CPT | Mod: PBBFAC,,, | Performed by: INTERNAL MEDICINE

## 2017-08-30 PROCEDURE — 3074F SYST BP LT 130 MM HG: CPT | Mod: S$GLB,,, | Performed by: INTERNAL MEDICINE

## 2017-08-30 PROCEDURE — 73630 X-RAY EXAM OF FOOT: CPT | Mod: 26,LT,, | Performed by: RADIOLOGY

## 2017-08-30 PROCEDURE — 3046F HEMOGLOBIN A1C LEVEL >9.0%: CPT | Mod: S$GLB,,, | Performed by: INTERNAL MEDICINE

## 2017-08-30 PROCEDURE — 87624 HPV HI-RISK TYP POOLED RSLT: CPT

## 2017-08-30 PROCEDURE — 3008F BODY MASS INDEX DOCD: CPT | Mod: S$GLB,,, | Performed by: INTERNAL MEDICINE

## 2017-08-30 PROCEDURE — 99999 PR PBB SHADOW E&M-EST. PATIENT-LVL III: CPT | Mod: PBBFAC,,, | Performed by: PODIATRIST

## 2017-08-30 PROCEDURE — 99213 OFFICE O/P EST LOW 20 MIN: CPT | Mod: 25,S$GLB,, | Performed by: PODIATRIST

## 2017-08-30 RX ORDER — DOXYCYCLINE HYCLATE 100 MG
100 TABLET ORAL 2 TIMES DAILY
Qty: 28 TABLET | Refills: 3 | Status: SHIPPED | OUTPATIENT
Start: 2017-08-30 | End: 2017-12-08

## 2017-08-30 RX ORDER — GABAPENTIN 400 MG/1
400 CAPSULE ORAL 2 TIMES DAILY
Qty: 180 CAPSULE | Refills: 3 | Status: SHIPPED | OUTPATIENT
Start: 2017-08-30 | End: 2017-12-08 | Stop reason: SDUPTHER

## 2017-08-30 RX ORDER — ACETAMINOPHEN AND CODEINE PHOSPHATE 300; 30 MG/1; MG/1
1 TABLET ORAL EVERY 6 HOURS PRN
Qty: 90 TABLET | Refills: 2 | Status: SHIPPED | OUTPATIENT
Start: 2017-08-30 | End: 2018-03-14 | Stop reason: SDUPTHER

## 2017-08-30 NOTE — PATIENT INSTRUCTIONS
Diabetes: Inspecting Your Feet    Diabetes increases your chances of developing foot problems. So inspect your feet every day. This helps you find small skin irritations before they become serious ulcers or infections. If you have trouble seeing the bottoms of your feet, use a mirror or ask a family member or friend to help.  How to check your feet  Below are tips to help you look for foot problems. Try to check your feet at the same time each day, such as when you get out of bed in the morning:  · Check the top of each foot. The tops of toes, back of the heel, and outer edge of the foot can get a lot of rubbing from poor-fitting shoes.  · Check the bottom of each foot. Daily wear and tear often leads to problems at pressure spots.  · Check the toes and nails. Fungal infections often occur between toes. Toenail problems can also be a sign of fungal infections or lead to breaks in the skin.  · Check your shoes, too. Loose objects inside a shoe can injure the foot. Use your hand to feel inside your shoes for things like marco, loose stitching, or rough areas that could irritate your skin.  Warning signs  Look for any color changes in the foot. Redness with streaks can signal a severe infection, which needs immediate medical attention. Tell your healthcare provider right away if you have any of these problems:  · Swelling, sometimes with color changes, may be a sign of poor blood flow or infection. Symptoms include tenderness and an increase in the size of your foot.  · Warm or hot areas on your feet may be signs of infection. A foot that is cold may not be getting enough blood.  · Sensations such as burning, tingling, or pins and needles can be signs of a problem. Also check for areas that may be numb.  · Hot spots are caused by friction or pressure. Look for hot spots in areas that get a lot of rubbing. Hot spots can turn into blisters, calluses, or sores.  · Cracks and sores are caused by dry or irritated  skin. They are a sign that the skin is breaking down, which can lead to infection.  · Toenail problems to watch for include nails growing into the skin (ingrown toenail) and causing redness or pain. Thick, yellow, or discolored nails can signal a fungal infection.  · Drainage and odor can develop from untreated sores and ulcers. Call your healthcare provider right away if you notice white or yellow drainage, bleeding, or unpleasant odor.   Date Last Reviewed: 6/1/2016 © 2000-2016 [employer]. [address] [place] [address]. All rights reserved. This information is not intended as a substitute for professional medical care. Always follow your healthcare professional's instructions.      Eating the Right Number of Calories (5947-7906 Guidelines)  Calories are a measure of the energy you get from food. If you eat more calories than you use, you will gain weight. If you eat fewer calories than you use, you will lose weight. Below are tables that give the number of calories needed each day. Look for your gender, age, and activity level. If you stick to this number, you should neither gain nor lose weight. Note that this is an estimated number of calories.* Your exact number may differ.  Women  Age in years Low activity level (calories/day) Moderate activity level (calories/day) High activity level (calories/day)   19 to 30 1,800-2,000 2,000-2,200 2,400   31 to 50 1,800 2,000 2,200   51 and older 1,600 1,800 2,000-2,200      Men  Age in years Low activity level  (calories/day) Moderate activity level (calories/day) High activity level (calories/day)   19 to 30 2,400-2,600 2,600-2,800 3,000   31 to 50 2,200-2,400 2,400-2,600 2,800-3,000   51 and older 2,000-2,200 2,200-2,400 2,400-2,800   Activity levels defined  · Low. Only light physical activity such as that done during typical daily life.  · Moderate. Light physical activity done during typical daily life AND physical activity equal to walking  about 1.5 to 3 miles a day at 3 to 4 miles per hour.  · High. Light physical activity done during typical daily life AND physical activity equal to walking more than 3 miles a day at 3 to 4 miles per hour.  *From Dietary Guidelines for Americans, 8765-0730, U.S. Department of Health and Human Services.  Date Last Reviewed: 6/1/2015  © 0040-0543 [employer]. [address] [place] [address], [place] [address]. All rights reserved. This information is not intended as a substitute for professional medical care. Always follow your healthcare professional's instructions.        Long-Term Complications of Diabetes    Diabetes can cause health problems over time. These are called complications. They are more likely to happen if your blood sugar is often too high. Over time, high blood sugar can damage blood vessels in your body. It is important to keep your blood sugar in your target range. This can help prevent or delay complications from diabetes.  Possible complications  Complications of diabetes include:  · Eye problems, including damage to the blood vessels in the eyes (retinopathy), pressure in the eye (glaucoma), and clouding of the eyes lens (a cataract). Eye problems can eventually lead to irreversible blindness.   · Tooth and gum problems (periodontal disease), causing loss of teeth and bone  · Blood vessel (vascular) disease leading to circulation problems, heart attack or stroke, or a need for amputation of a limb   · Problems with sexual function leading to erectile dysfunction in men and sexual discomfort in women   · Kidney disease (nephropathy) can eventually lead to kidney failure, which may require dialysis or kidney transplant   · Nerve problems (neuropathy), causing pain or loss of feeling in your feet and other parts of your body, potentially leading to an amputation of a limb   · High blood pressure (hypertension), putting strain on your heart and blood vessels  · Serious infections, possibly  leading to loss of toes, feet, or limbs  How to avoid complications  The serious consequences of these complications may be avoidable for most people with diabetes by managing your blood glucose, blood pressure, and cholesterol levels. This can help you feel better and stay healthy. You can manage diabetes by tracking your blood sugar. You can also eat healthy and exercise to avoid gaining weight. And you should take medicine if directed by your healthcare provider.  Date Last Reviewed: 5/1/2016  © 5426-2186 [employer]. [address] [place] [address], [place] [address]. All rights reserved. This information is not intended as a substitute for professional medical care. Always follow your healthcare professional's instructions.

## 2017-08-30 NOTE — PROGRESS NOTES
Subjective:    Patient ID: Kylie King is a 61 y.o. female.    Chief Complaint: Diabetes Mellitus (PCP Dr. Foote 8/30/17); Diabetic Foot Exam; Routine Foot Care; and Peripheral Neuropathy      HPI:   Pt presents for DM foot exam  She is s/p L 3rd toe distal phalanx amputation for osteomyelitis last november  Pt reports painfull nails  Pt has no other complaints    This patient has documented high risk feet requiring routine maintenance secondary to diabetes mellitis and those secondary complications of diabetes, as mentioned.    PCP: Virginia Foote MD    Date Last Seen by PCP: lang  Current shoe gear:  Affected Foot: Extra depth shoes with custome accommodative insoles       Hemoglobin A1C   Date Value Ref Range Status   03/17/2017 10.4 (H) 4.5 - 6.2 % Final     Comment:     According to ADA guidelines, hemoglobin A1C <7.0% represents  optimal control in non-pregnant diabetic patients.  Different  metrics may apply to specific populations.   Standards of Medical Care in Diabetes - 2016.  For the purpose of screening for the presence of diabetes:  <5.7%     Consistent with the absence of diabetes  5.7-6.4%  Consistent with increasing risk for diabetes   (prediabetes)  >or=6.5%  Consistent with diabetes  Currently no consensus exists for use of hemoglobin A1C  for diagnosis of diabetes for children.     05/17/2016 11.4 (H) 4.5 - 6.2 % Final   12/29/2015 12.4 (H) 4.5 - 6.2 % Final         Past Medical History:   Diagnosis Date    Anxiety     Breast cancer 1/2013    left breast- invasive mammary carcinoma, ER/DC positive, Her2 negative    Choledocholithiasis     s/p ERCP and stent placement    Colon cancer 2001    CRI (chronic renal insufficiency)     one kidney    GERD (gastroesophageal reflux disease)     History of acute pancreatitis 2009 2/09 s/p sphincterotomy    History of colon cancer     s/p sigmoid colectomy    HTN (hypertension), benign     Hyperlipidemia     Intracerebral hemorrhage  "    Kidney stone     left    Obesity     Pancreatitis     Pancreatitis 2008    Stroke 12/2012    Tobacco abuse     Type 2 diabetes mellitus with neurological manifestations, controlled      Past Surgical History:   Procedure Laterality Date    BREAST BIOPSY  1/2012    left breast invasive mammary carcinoma (lateral bx) and intraductal papilloma (medial bx)    BREAST LUMPECTOMY  1999    right breast- benign fibroadenoma    BREAST RECONSTRUCTION  2013    CHOLECYSTECTOMY  2001    COLON SURGERY      HERNIA REPAIR      left nephrectomy      atrophic kidney and hydronephrosis    left oopherectomy  2001    MASTECTOMY  2013    left    NEPHRECTOMY  2011    lap    SIGMOIDECTOMY  2001     Social History     Social History    Marital status: Single     Spouse name: N/A    Number of children: 2    Years of education: N/A     Occupational History    not working [employer]     Social History Main Topics    Smoking status: Current Every Day Smoker     Packs/day: 0.50     Types: Cigarettes    Smokeless tobacco: Never Used      Comment: anxious    Alcohol use No    Drug use: No    Sexual activity: No     Other Topics Concern    Not on file     Social History Narrative    She is not exercising regularly         Current Outpatient Prescriptions:     acetaminophen-codeine 300-30mg (TYLENOL-CODEINE #3) 300-30 mg Tab, Take 1 tablet by mouth every 6 (six) hours as needed., Disp: 90 tablet, Rfl: 2    amlodipine (NORVASC) 10 MG tablet, TAKE 1 TABLET EVERY EVENING, Disp: 90 tablet, Rfl: 3    BD INSULIN PEN NEEDLE UF MINI 31 gauge x 3/16" Ndle, USE 5 NEEDLES PER DAY, Disp: 450 each, Rfl: 2    blood sugar diagnostic (ONETOUCH ULTRA TEST) Strp, 1 strip by Misc.(Non-Drug; Combo Route) route 4 (four) times daily before meals and nightly., Disp: 400 strip, Rfl: 4    carvedilol (COREG) 25 MG tablet, Take 1 tablet (25 mg total) by mouth 2 (two) times daily., Disp: 180 tablet, Rfl: 4    citalopram (CELEXA) 20 MG " "tablet, TAKE 1 TABLET NIGHTLY, Disp: 90 tablet, Rfl: 3    doxycycline (VIBRA-TABS) 100 MG tablet, Take 1 tablet (100 mg total) by mouth 2 (two) times daily. Remain upright for 60 minutes after each dose., Disp: 28 tablet, Rfl: 3    furosemide (LASIX) 40 MG tablet, Take 1 tablet (40 mg total) by mouth once daily., Disp: 90 tablet, Rfl: 4    gabapentin (NEURONTIN) 400 MG capsule, Take 1 capsule (400 mg total) by mouth 2 (two) times daily., Disp: 180 capsule, Rfl: 3    HUMALOG KWIKPEN 100 unit/mL InPn pen, BLOOD GLUCOSE 150-200, INJECT 15 UNITS UNDER THE SKIN, 201-250, 18 UNITS, 251-300, 20 UNITS THREE TIMES A DAY AS DIRECTED, Disp: 45 mL, Rfl: 3    hydrALAZINE (APRESOLINE) 50 MG tablet, TAKE 1 TABLET EVERY 8 HOURS, Disp: 270 tablet, Rfl: 3    insulin needles, disposable, (PEN NEEDLE, DIABETIC) 31 Ndle, Patient needs 5 needles a day, Disp: 500 each, Rfl: 3    insulin needles, disposable, 32 x 5/32 " Ndle, 1 Device by Misc.(Non-Drug; Combo Route) route 5 (five) times daily., Disp: 400 each, Rfl: 6    lancets (ONETOUCH DELICA LANCETS) 33 gauge Misc, 1 lancet by Misc.(Non-Drug; Combo Route) route 4 (four) times daily before meals and nightly., Disp: 400 each, Rfl: 4    letrozole (FEMARA) 2.5 mg Tab, Take 1 tablet (2.5 mg total) by mouth every evening., Disp: 90 tablet, Rfl: 3    LEVEMIR FLEXTOUCH 100 unit/mL (3 mL) InPn pen, INJECT 40 UNITS UNDER THE SKIN TWICE A DAY, Disp: 75 mL, Rfl: 3    lisinopril (PRINIVIL,ZESTRIL) 40 MG tablet, TAKE 1 TABLET DAILY, Disp: 90 tablet, Rfl: 3    pravastatin (PRAVACHOL) 40 MG tablet, TAKE 1 TABLET EVERY EVENING, Disp: 90 tablet, Rfl: 3    ranitidine (ZANTAC) 150 MG capsule, TAKE 1 CAPSULE NIGHTLY, Disp: 90 capsule, Rfl: 3    oxybutynin (DITROPAN XL) 15 MG TR24, Take 1 tablet (15 mg total) by mouth once daily., Disp: 90 tablet, Rfl: 4    Current Facility-Administered Medications:     cefTRIAXone injection 2 g, 2 g, Intramuscular, Once, Spencer Roldan MD    lidocaine " "HCL 10 mg/ml (1%) injection 1 mL, 1 mL, Intramuscular, Once, Spencer Roldan MD  Review of patient's allergies indicates:   Allergen Reactions    Cyclobenzaprine Other (See Comments)     Patient stated causes hallucinations.     Erythromycin      Other reaction(s): Stomach upset    Keflex [cephalexin]      Yeast infection       BP (!) 161/81   Pulse 67   Ht 5' 4.5" (1.638 m)   BMI 48.21 kg/m²     ROS    Vascular: positive for edema  Musculoskeletal: positive for joint pain, joint edema  Skin: positive for lesions  Neurological: positive for burning, tingling, numbness  Gastrointestinal: negative for stomach pain, nausea and vomiting    ROS Constitutional:  General Appearance: well nourished        Objective:        Ortho/SPM Exam  Physical Exam    V: DP 1/4, PT 1/4, CRT< 3s to all digits tested.     N: Sensation diminished to all distributions    Derm: skin intact. + hyperkeratotic lesion at tip of toes 1,4,5 b/l. Nails mycotic and dystrophic x 9    Ortho:  +Motor EHL/FHL/TA/GA   L 3rd toe amp at dipj noted -  Well healed   Compartments soft/compressible. No pain on passive stretch of big toe. No calf  pain.          Assessment:     Imagin. DM type 2, uncontrolled, with neuropathy    2. Uncontrolled type 2 diabetes mellitus with diabetic nephropathy, with long-term current use of insulin    3. Morbid obesity with BMI of 45.0-49.9, adult    4. CKD (chronic kidney disease) stage 3, GFR 30-59 ml/min    5. S/P amputation of lesser toe, unspecified laterality    6. PVD (peripheral vascular disease)    7. Onychomycosis due to dermatophyte          Plan:       Orders Placed This Encounter    Foot Care    X-Ray Foot Complete Left    Cardiology Lab US Lower Extremity Arteries Bilateral     .   Kylie was seen today for diabetes mellitus, diabetic foot exam, routine foot care and peripheral neuropathy.    Diagnoses and all orders for this visit:    DM type 2, uncontrolled, with neuropathy  -     Foot " "Care    Uncontrolled type 2 diabetes mellitus with diabetic nephropathy, with long-term current use of insulin    Morbid obesity with BMI of 45.0-49.9, adult    CKD (chronic kidney disease) stage 3, GFR 30-59 ml/min    S/P amputation of lesser toe, unspecified laterality  -     X-Ray Foot Complete Left; Future    PVD (peripheral vascular disease)  -     Cardiology Lab US Lower Extremity Arteries Bilateral; Future    Onychomycosis due to dermatophyte    Routine Foot Care  Date/Time: 8/30/2017 10:24 AM  Performed by: СВЕТЛАНА TURPIN JR.  Authorized by: СВЕТЛАНА TURPIN JR.     Time out: Immediately prior to procedure a "time out" was called to verify the correct patient, procedure, equipment, support staff and site/side marked as required.    Consent Done?:  Yes (Verbal)  Hyperkeratotic Skin Lesions?: No      Nail Care Type:  Debride  Patient tolerance:  Patient tolerated the procedure well with no immediate complications      Kylie King is a 61 y.o. female presenting w/   1. DM type 2, uncontrolled, with neuropathy    2. Uncontrolled type 2 diabetes mellitus with diabetic nephropathy, with long-term current use of insulin    3. Morbid obesity with BMI of 45.0-49.9, adult    4. CKD (chronic kidney disease) stage 3, GFR 30-59 ml/min    5. S/P amputation of lesser toe, unspecified laterality    6. PVD (peripheral vascular disease)    7. Onychomycosis due to dermatophyte      ADA Risk Classification: Hx of ulcer or amputation - 3: rtc 1-2 months    -pt seen, evaluated, and managed  -dx discussed in detail. All questions/concerns addressed  -xr and jerad ordered on way out  -procedures as above  -educated on DM footcare    Return in about 2 months (around 10/30/2017).            "

## 2017-08-30 NOTE — PROGRESS NOTES
CC: Routine GYN exam    Kylie King is a 61 y.o. female  presents for well woman exam.  No LMP recorded. Patient is postmenopausal.  Pt reports this is the first time she has been seen by OB/GYN provider since 2011, secondary to more pressing health issues during the interim.    Pt has a history of breast cancer in the left breast () - invasive mammary carcinoma.  Had mastectomy and reconstruction surgery per Dr. Moreno.  Has not had any further surveillance of breasts since that time and is not currently followed by anyone for this.    Also reports history of colon cancer () with sigmoidectomy    Last pap: ?  - Normal per pt report.    Past Medical History:   Diagnosis Date    Anxiety     Breast cancer 2013    left breast- invasive mammary carcinoma, ER/ND positive, Her2 negative    Choledocholithiasis     s/p ERCP and stent placement    Colon cancer     CRI (chronic renal insufficiency)     one kidney    GERD (gastroesophageal reflux disease)     History of acute pancreatitis  s/p sphincterotomy    History of colon cancer     s/p sigmoid colectomy    HTN (hypertension), benign     Hyperlipidemia     Intracerebral hemorrhage     Kidney stone     left    Obesity     Pancreatitis     Pancreatitis 2008    Stroke 2012    Tobacco abuse     Type 2 diabetes mellitus with neurological manifestations, controlled      Past Surgical History:   Procedure Laterality Date    BREAST BIOPSY  2012    left breast invasive mammary carcinoma (lateral bx) and intraductal papilloma (medial bx)    BREAST BIOPSY      rt breast FA    CHOLECYSTECTOMY      COLON SURGERY      HERNIA REPAIR      left nephrectomy      atrophic kidney and hydronephrosis    left oopherectomy  2001    MASTECTOMY  2013    left    NEPHRECTOMY  2011    lap    SIGMOIDECTOMY  2001    TOTAL REDUCTION MAMMOPLASTY Right 2013     Social History     Social History    Marital status:  "Single     Spouse name: N/A    Number of children: 2    Years of education: N/A     Occupational History    not working [employer]     Social History Main Topics    Smoking status: Current Every Day Smoker     Packs/day: 0.50     Types: Cigarettes    Smokeless tobacco: Never Used      Comment: anxious    Alcohol use No    Drug use: No    Sexual activity: No     Other Topics Concern    Not on file     Social History Narrative    She is not exercising regularly     Family History   Problem Relation Age of Onset    Arthritis Mother     Heart disease Mother     Diabetes Father     Heart disease Father     Celiac disease Sister     Breast cancer Maternal Grandmother      possible-  patient unsure    Cancer Maternal Grandmother     Heart disease Maternal Grandmother     Cancer Maternal Aunt     Ovarian cancer Neg Hx      OB History      Para Term  AB Living    3 3 3          SAB TAB Ectopic Multiple Live Births                       BP (!) 130/90   Ht 5' 4" (1.626 m)   Wt 129.4 kg (285 lb 4.4 oz)   BMI 48.97 kg/m²       ROS:  GENERAL: Denies weight gain or weight loss. Feeling well overall.   SKIN: Denies rash or lesions.   HEAD: Denies head injury or headache.   NODES: Denies enlarged lymph nodes.   CHEST: Denies chest pain or shortness of breath.   CARDIOVASCULAR: Denies palpitations or left sided chest pain.   ABDOMEN: No abdominal pain, constipation, diarrhea, nausea, vomiting or rectal bleeding.   URINARY: No frequency, dysuria, hematuria, or burning on urination.  REPRODUCTIVE: See HPI.   BREASTS: The patient performs breast self-examination and denies pain, lumps, or nipple discharge.   HEMATOLOGIC: No easy bruisability or excessive bleeding.   MUSCULOSKELETAL: Denies joint pain or swelling.   NEUROLOGIC: Denies syncope or weakness.   PSYCHIATRIC: Denies depression, anxiety or mood swings.    PHYSICAL EXAM:  APPEARANCE: Obese, well developed, in no acute distress.  AFFECT: " Alert and oriented x 3  SKIN: Warm, dry, & intact. No acne or hirsutism.  NECK: Neck symmetric without masses or thyromegaly  NODES: No cervical, axillary, epitrochlear, inguinal, or femoral lymph node enlargement  CHEST: Good respiratory effect.  ABDOMEN: Soft.  No tenderness or masses.  No hepatosplenomegaly.  No hernias.  BREASTS: Not symmetrical - right larger than left. Scarring noted to both breasts per pt's surgical history.  No visible lesions.  No palpable masses, no nipple discharge bilaterally.  PELVIC: Normal external genitalia without lesions.  Normal hair distribution.  Adequate perineal body, normal urethral meatus.  Vagina moist and well rugated without lesions or discharge.  Cervix pink, without lesions, discharge or tenderness.  No significant cystocele or rectocele.  Bimanual exam limited per pt habitus: what can be appreciated shows uterus to be regular, mobile and nontender.  Adnexa without masses or tenderness.    EXTREMITIES: No edema.  Pt assisted off exam table following.      ASSESSMENT/PLAN:  Encounter for gynecological examination with abnormal finding  -     HPV High Risk Genotypes, PCR  -     Liquid-based pap smear, screening    History of breast cancer in female  -     Ambulatory Referral to Breast Surgery    History of colon cancer    Patient was counseled today on A.C.S. Pap guidelines and recommendations for yearly pelvic exams, mammograms and monthly self breast exams; to see her PCP for other health maintenance.     Return in about 1 year (around 8/30/2018).

## 2017-08-30 NOTE — PROGRESS NOTES
CC: followup of chronic medical problems  HPI:  The patient is a 61 y.o. year old female who presents to the office for followup of chronic medical problems.  The patient denies any chest pain, headache, blurred vision, excessive fatigue, nausea or vomiting, but complains of shortness of breath with activity intermittently.  She reports her blood sugars have been elevated.  She also states her right knee has been giving out.  She had done physical therapy with some relief.  She reports persistent right shoulder and knee pain.  She reports excessive somnolence with enuresis.  She is taking ditropan, alternating 10 and 15 mg doses.  The patient complains of right lower extremity redness and small bumps on bilateral lower extremities.  She also complains of dry mouth.  The patient states her knee continues to give out on her.  She uses the walker regularly.  The patient complains of pain in her right axilla.    PAST MEDICAL HISTORY:  Past Medical History:   Diagnosis Date    Anxiety     Breast cancer 1/2013    left breast- invasive mammary carcinoma, ER/PA positive, Her2 negative    Choledocholithiasis     s/p ERCP and stent placement    Colon cancer 2001    CRI (chronic renal insufficiency)     one kidney    GERD (gastroesophageal reflux disease)     History of acute pancreatitis 2009 2/09 s/p sphincterotomy    History of colon cancer     s/p sigmoid colectomy    HTN (hypertension), benign     Hyperlipidemia     Intracerebral hemorrhage     Kidney stone     left    Obesity     Pancreatitis     Pancreatitis 2008    Stroke 12/2012    Tobacco abuse     Type 2 diabetes mellitus with neurological manifestations, controlled        SURGICAL HISTORY:  Past Surgical History:   Procedure Laterality Date    BREAST BIOPSY  1/2012    left breast invasive mammary carcinoma (lateral bx) and intraductal papilloma (medial bx)    BREAST LUMPECTOMY  1999    right breast- benign fibroadenoma    BREAST  RECONSTRUCTION  2013    CHOLECYSTECTOMY  2001    COLON SURGERY      HERNIA REPAIR      left nephrectomy      atrophic kidney and hydronephrosis    left oopherectomy  2001    MASTECTOMY  2013    left    NEPHRECTOMY  2011    lap    SIGMOIDECTOMY  2001       MEDS:  Medcard reviewed and updated    ALLERGIES: Allergy Card reviewed and updated    SOCIAL HISTORY:   The patient is a nonsmoker.    PE:   APPEARANCE: Well nourished, well developed, in no acute distress.    CHEST: Lungs clear to auscultation with unlabored respirations.  CARDIOVASCULAR: Normal S1, S2. No murmurs. No carotid bruits. No pedal edema.  ABDOMEN: Bowel sounds normal. Not distended. Soft. No tenderness or masses.   MUSCULOSKELETAL:  Abnormal gait, ambulates with a walker. Pain with palpation of right axilla.  SKIN: Positive erythema of right lower extremity with small papules on bilateral lower extremities.    PSYCHIATRIC: The patient is oriented to person, place, and time and has a pleasant affect.        ASSESSMENT/PLAN:  Kylie was seen today for shoulder pain, knee pain and back pain.    Diagnoses and all orders for this visit:    Essential hypertension  -     Comprehensive metabolic panel; Future  -     Lipid panel; Future    Uncontrolled type 2 diabetes mellitus with stage 4 chronic kidney disease, with long-term current use of insulin  -     Hemoglobin A1c; Future  -     Comprehensive metabolic panel; Future    Diabetic peripheral neuropathy associated with type 2 diabetes mellitus  -     Check hemoglobin A1c    Spinal stenosis, lumbar  -      Continue pain medication    Somnolence  -     Defer sleep medicine referral for now    Enuresis  -      Improved glycemic control    Cellulitis of right lower extremity  -     Prescribed doxycycline    Chronic right shoulder pain  -     Ambulatory consult to Physical Therapy    Chronic pain of right knee  -     Ambulatory consult to Physical Therapy    CKD (chronic kidney disease) stage 3, GFR  30-59 ml/min  -     Followed by nephrology    Axillary pain, right  -     Mammo Digital Diagnostic Right With CAD; Future    History of breast cancer  -     Mammo Digital Diagnostic Right With CAD; Future    Tethered cord  -     gabapentin (NEURONTIN) 400 MG capsule; Take 1 capsule (400 mg total) by mouth 2 (two) times daily.    Spondylosis without myelopathy  -     gabapentin (NEURONTIN) 400 MG capsule; Take 1 capsule (400 mg total) by mouth 2 (two) times daily.    DDD (degenerative disc disease), lumbosacral  -     gabapentin (NEURONTIN) 400 MG capsule; Take 1 capsule (400 mg total) by mouth 2 (two) times daily.    Spinal stenosis of lumbar region with neurogenic claudication  -     gabapentin (NEURONTIN) 400 MG capsule; Take 1 capsule (400 mg total) by mouth 2 (two) times daily.    Thoracic and lumbosacral neuritis  -     gabapentin (NEURONTIN) 400 MG capsule; Take 1 capsule (400 mg total) by mouth 2 (two) times daily.    Acquired spondylolisthesis  -     gabapentin (NEURONTIN) 400 MG capsule; Take 1 capsule (400 mg total) by mouth 2 (two) times daily.    Bilateral low back pain without sciatica, unspecified chronicity  -     gabapentin (NEURONTIN) 400 MG capsule; Take 1 capsule (400 mg total) by mouth 2 (two) times daily.    Other orders  -     doxycycline (VIBRA-TABS) 100 MG tablet; Take 1 tablet (100 mg total) by mouth 2 (two) times daily. Remain upright for 60 minutes after each dose.  -     acetaminophen-codeine 300-30mg (TYLENOL-CODEINE #3) 300-30 mg Tab; Take 1 tablet by mouth every 6 (six) hours as needed.        Answers for HPI/ROS submitted by the patient on 8/30/2017   activity change: Yes  unexpected weight change: No  neck pain: No  hearing loss: No  rhinorrhea: No  trouble swallowing: No  eye discharge: Yes  visual disturbance: No  chest tightness: No  wheezing: No  chest pain: No  palpitations: No  blood in stool: No  constipation: No  vomiting: No  diarrhea: No  polydipsia: Yes  polyuria:  Yes  difficulty urinating: No  hematuria: No  menstrual problem: No  dysuria: No  joint swelling: No  arthralgias: Yes  headaches: No  weakness: No  confusion: No  dysphoric mood: No

## 2017-09-01 ENCOUNTER — HOSPITAL ENCOUNTER (OUTPATIENT)
Dept: RADIOLOGY | Facility: HOSPITAL | Age: 62
Discharge: HOME | End: 2017-09-01
Attending: INTERNAL MEDICINE
Payer: COMMERCIAL

## 2017-09-01 ENCOUNTER — OFFICE VISIT (OUTPATIENT)
Dept: SURGERY | Facility: CLINIC | Age: 62
End: 2017-09-01
Payer: COMMERCIAL

## 2017-09-01 VITALS
DIASTOLIC BLOOD PRESSURE: 74 MMHG | HEART RATE: 66 BPM | HEIGHT: 64 IN | SYSTOLIC BLOOD PRESSURE: 165 MMHG | TEMPERATURE: 98 F | WEIGHT: 284 LBS | BODY MASS INDEX: 48.49 KG/M2

## 2017-09-01 VITALS — BODY MASS INDEX: 48.65 KG/M2 | WEIGHT: 285 LBS | HEIGHT: 64 IN

## 2017-09-01 DIAGNOSIS — R07.89 CHEST WALL PAIN: Primary | ICD-10-CM

## 2017-09-01 DIAGNOSIS — M79.621 AXILLARY PAIN, RIGHT: ICD-10-CM

## 2017-09-01 DIAGNOSIS — Z85.3 HISTORY OF BREAST CANCER: ICD-10-CM

## 2017-09-01 PROCEDURE — 77061 BREAST TOMOSYNTHESIS UNI: CPT | Mod: 26,,, | Performed by: RADIOLOGY

## 2017-09-01 PROCEDURE — 77061 BREAST TOMOSYNTHESIS UNI: CPT | Mod: TC

## 2017-09-01 PROCEDURE — 77065 DX MAMMO INCL CAD UNI: CPT | Mod: 26,,, | Performed by: RADIOLOGY

## 2017-09-01 PROCEDURE — 99999 PR PBB SHADOW E&M-EST. PATIENT-LVL III: CPT | Mod: PBBFAC,,, | Performed by: SURGERY

## 2017-09-01 PROCEDURE — 99212 OFFICE O/P EST SF 10 MIN: CPT | Mod: S$GLB,,, | Performed by: SURGERY

## 2017-09-01 NOTE — PROGRESS NOTES
Patient known to me  Had left mastectomy with implant reconstruction in 2013  Know concerned about a new pain in her right breast  On exam this pain is reproduced with palpation of her =right pectoral muscle  There are no palpable breast findings  Her mammogram today normal  Patient reassured  Will see prn

## 2017-09-01 NOTE — LETTER
September 1, 2017      Allison Arriaza, TATUM  [address] [facility] [address]           [facility]oulouElliotAM[facility]  [address]  [facility] [address]  Phone: [phone]  Fax: [phone]          Patient: Kylie King   MR Number: [id number]   YOB: 1955   Date of Visit: 9/1/2017       Dear Allison Arriaza:    Thank you for referring Kylie King to me for evaluation. Attached you will find relevant portions of my assessment and plan of care.    If you have questions, please do not hesitate to call me. I look forward to following Kylie King along with you.    Sincerely,    Dirk Oneil MD    Enclosure  CC:  No Recipients    If you would like to receive this communication electronically, please contact [email or web address] or [phone] to request more information on [facility] access.    For providers and/or their staff who would like to refer a patient to [facility], please contact us through our one-stop-shop provider referral line, [facility], at [phone].    If you feel you have received this communication in error or would no longer like to receive these types of communications, please e-mail [email or web address]

## 2017-09-06 ENCOUNTER — CLINICAL SUPPORT (OUTPATIENT)
Dept: CARDIOLOGY | Facility: CLINIC | Age: 62
End: 2017-09-06
Payer: COMMERCIAL

## 2017-09-06 DIAGNOSIS — I73.9 PVD (PERIPHERAL VASCULAR DISEASE): ICD-10-CM

## 2017-09-06 PROCEDURE — 93925 LOWER EXTREMITY STUDY: CPT | Mod: S$GLB,,, | Performed by: INTERNAL MEDICINE

## 2017-09-07 LAB
HPV HR 12 DNA CVX QL NAA+PROBE: NEGATIVE
HPV16 DNA SPEC QL NAA+PROBE: NEGATIVE
HPV18 DNA SPEC QL NAA+PROBE: NEGATIVE

## 2017-09-08 ENCOUNTER — TELEPHONE (OUTPATIENT)
Dept: OBSTETRICS AND GYNECOLOGY | Facility: HOSPITAL | Age: 62
End: 2017-09-08

## 2017-10-02 ENCOUNTER — TELEPHONE (OUTPATIENT)
Dept: OBSTETRICS AND GYNECOLOGY | Facility: CLINIC | Age: 62
End: 2017-10-02

## 2017-10-02 NOTE — TELEPHONE ENCOUNTER
----- Message from Liza Freeman MA sent at 10/2/2017  8:05 AM CDT -----      ----- Message -----  From: Ana Babin MD  Sent: 9/30/2017   9:34 AM  To: Olaf Huitron    Cannot remember Patti's in basket- can you forward please  This is a patient that needs to be seen in survivorship please-   ----- Message -----  From: Allison Arriaza CNM  Sent: 9/29/2017   5:09 PM  To: Ana Babin MD    Would this be someone good for your survivorship clinic?    Allison Arriaza CNM

## 2017-10-02 NOTE — TELEPHONE ENCOUNTER
Called Kylie King to schedule survivorship appointment as recommended by Dr. Arriaza.  Left message.  Awaiting return call.

## 2017-10-05 ENCOUNTER — TELEPHONE (OUTPATIENT)
Dept: OBSTETRICS AND GYNECOLOGY | Facility: CLINIC | Age: 62
End: 2017-10-05

## 2017-10-05 NOTE — TELEPHONE ENCOUNTER
Called Kylie King and scheduled a survivorship appointment as suggested by Dr. Arriaza.  Patient agreeable to November 30 at 10:30am.

## 2017-10-05 NOTE — TELEPHONE ENCOUNTER
----- Message from Liza Freeman MA sent at 10/5/2017 12:58 PM CDT -----  Contact: pt      ----- Message -----  From: Jenae Spain  Sent: 10/5/2017  12:06 PM  To: Olaf JENKINS Staff    X_  1st Request  _  2nd Request  _  3rd Request    Who:KP CLARK [[id number]]    Why: Patient states she is returning a call     What Number to Call Back: [phone]    When to Expect a call back: (Before the end of the day)   -- if call after 3:00 call back will be tomorrow.

## 2017-10-05 NOTE — TELEPHONE ENCOUNTER
Second attempt to contact Kylie King to schedule a Survivorship appointment as recommended by Dr. Arriaza.  Left message.  Awaiting return call.

## 2017-12-08 ENCOUNTER — OFFICE VISIT (OUTPATIENT)
Dept: INTERNAL MEDICINE | Facility: CLINIC | Age: 62
End: 2017-12-08
Payer: COMMERCIAL

## 2017-12-08 VITALS
DIASTOLIC BLOOD PRESSURE: 82 MMHG | RESPIRATION RATE: 20 BRPM | SYSTOLIC BLOOD PRESSURE: 170 MMHG | WEIGHT: 278 LBS | TEMPERATURE: 98 F | HEART RATE: 76 BPM | BODY MASS INDEX: 47.46 KG/M2 | HEIGHT: 64 IN

## 2017-12-08 DIAGNOSIS — Q06.8 TETHERED CORD: ICD-10-CM

## 2017-12-08 DIAGNOSIS — M43.10 ACQUIRED SPONDYLOLISTHESIS: ICD-10-CM

## 2017-12-08 DIAGNOSIS — M48.062 SPINAL STENOSIS OF LUMBAR REGION WITH NEUROGENIC CLAUDICATION: ICD-10-CM

## 2017-12-08 DIAGNOSIS — E78.5 HYPERLIPIDEMIA, UNSPECIFIED HYPERLIPIDEMIA TYPE: ICD-10-CM

## 2017-12-08 DIAGNOSIS — I10 ESSENTIAL HYPERTENSION: Primary | ICD-10-CM

## 2017-12-08 DIAGNOSIS — M51.37 DDD (DEGENERATIVE DISC DISEASE), LUMBOSACRAL: ICD-10-CM

## 2017-12-08 DIAGNOSIS — N39.46 MIXED URGE AND STRESS INCONTINENCE: ICD-10-CM

## 2017-12-08 DIAGNOSIS — M54.14 THORACIC AND LUMBOSACRAL NEURITIS: ICD-10-CM

## 2017-12-08 DIAGNOSIS — R35.1 NOCTURIA: ICD-10-CM

## 2017-12-08 DIAGNOSIS — M47.819 SPONDYLOSIS WITHOUT MYELOPATHY: ICD-10-CM

## 2017-12-08 DIAGNOSIS — M54.17 THORACIC AND LUMBOSACRAL NEURITIS: ICD-10-CM

## 2017-12-08 DIAGNOSIS — M54.50 BILATERAL LOW BACK PAIN WITHOUT SCIATICA, UNSPECIFIED CHRONICITY: ICD-10-CM

## 2017-12-08 DIAGNOSIS — F32.A DEPRESSION, UNSPECIFIED DEPRESSION TYPE: ICD-10-CM

## 2017-12-08 DIAGNOSIS — L30.9 DERMATITIS: ICD-10-CM

## 2017-12-08 PROCEDURE — 90686 IIV4 VACC NO PRSV 0.5 ML IM: CPT | Mod: S$GLB,,, | Performed by: INTERNAL MEDICINE

## 2017-12-08 PROCEDURE — 99999 PR PBB SHADOW E&M-EST. PATIENT-LVL III: CPT | Mod: PBBFAC,,, | Performed by: INTERNAL MEDICINE

## 2017-12-08 PROCEDURE — 99214 OFFICE O/P EST MOD 30 MIN: CPT | Mod: 25,S$GLB,, | Performed by: INTERNAL MEDICINE

## 2017-12-08 PROCEDURE — 90471 IMMUNIZATION ADMIN: CPT | Mod: S$GLB,,, | Performed by: INTERNAL MEDICINE

## 2017-12-08 RX ORDER — INSULIN LISPRO 100 [IU]/ML
INJECTION, SOLUTION INTRAVENOUS; SUBCUTANEOUS
Qty: 45 ML | Refills: 3 | Status: ON HOLD | OUTPATIENT
Start: 2017-12-08 | End: 2018-04-24

## 2017-12-08 RX ORDER — GABAPENTIN 400 MG/1
400 CAPSULE ORAL 2 TIMES DAILY
Qty: 180 CAPSULE | Refills: 3 | Status: SHIPPED | OUTPATIENT
Start: 2017-12-08 | End: 2018-10-11 | Stop reason: SDUPTHER

## 2017-12-08 RX ORDER — OXYBUTYNIN CHLORIDE 15 MG/1
15 TABLET, EXTENDED RELEASE ORAL DAILY
Qty: 90 TABLET | Refills: 4 | Status: SHIPPED | OUTPATIENT
Start: 2017-12-08 | End: 2018-08-14

## 2017-12-08 RX ORDER — MUPIROCIN 20 MG/G
OINTMENT TOPICAL 3 TIMES DAILY
Qty: 30 G | Refills: 1 | Status: ON HOLD | OUTPATIENT
Start: 2017-12-08 | End: 2018-04-24 | Stop reason: HOSPADM

## 2017-12-08 RX ORDER — HYDRALAZINE HYDROCHLORIDE 50 MG/1
50 TABLET, FILM COATED ORAL EVERY 8 HOURS
Qty: 270 TABLET | Refills: 3 | Status: ON HOLD | OUTPATIENT
Start: 2017-12-08 | End: 2018-04-24 | Stop reason: HOSPADM

## 2017-12-08 RX ORDER — LISINOPRIL 40 MG/1
40 TABLET ORAL DAILY
Qty: 90 TABLET | Refills: 3 | Status: ON HOLD | OUTPATIENT
Start: 2017-12-08 | End: 2018-04-24 | Stop reason: HOSPADM

## 2017-12-08 RX ORDER — CITALOPRAM 20 MG/1
20 TABLET, FILM COATED ORAL NIGHTLY
Qty: 90 TABLET | Refills: 3 | Status: SHIPPED | OUTPATIENT
Start: 2017-12-08 | End: 2018-10-11 | Stop reason: SDUPTHER

## 2017-12-08 RX ORDER — AMLODIPINE BESYLATE 10 MG/1
10 TABLET ORAL NIGHTLY
Qty: 90 TABLET | Refills: 3 | Status: SHIPPED | OUTPATIENT
Start: 2017-12-08 | End: 2018-10-11 | Stop reason: SDUPTHER

## 2017-12-08 RX ORDER — PRAVASTATIN SODIUM 40 MG/1
40 TABLET ORAL NIGHTLY
Qty: 90 TABLET | Refills: 3 | Status: SHIPPED | OUTPATIENT
Start: 2017-12-08 | End: 2018-08-13

## 2017-12-08 RX ORDER — CARVEDILOL 25 MG/1
25 TABLET ORAL 2 TIMES DAILY
Qty: 180 TABLET | Refills: 4 | Status: SHIPPED | OUTPATIENT
Start: 2017-12-08 | End: 2018-10-11 | Stop reason: SDUPTHER

## 2017-12-08 RX ORDER — LETROZOLE 2.5 MG/1
2.5 TABLET, FILM COATED ORAL NIGHTLY
Qty: 90 TABLET | Refills: 3 | Status: SHIPPED | OUTPATIENT
Start: 2017-12-08 | End: 2018-10-11 | Stop reason: SDUPTHER

## 2017-12-08 RX ORDER — FUROSEMIDE 40 MG/1
40 TABLET ORAL DAILY
Qty: 90 TABLET | Refills: 4 | Status: ON HOLD | OUTPATIENT
Start: 2017-12-08 | End: 2018-04-24

## 2017-12-08 RX ORDER — CLINDAMYCIN HYDROCHLORIDE 150 MG/1
150 CAPSULE ORAL EVERY 8 HOURS
Qty: 30 CAPSULE | Refills: 0 | Status: SHIPPED | OUTPATIENT
Start: 2017-12-08 | End: 2018-03-12 | Stop reason: ALTCHOICE

## 2017-12-08 NOTE — PROGRESS NOTES
CC: followup of hypertension  HPI:  The patient is a 62 y.o. year old female who presents to the office for followup of hypertension.  The patient denies any chest pain, but complains of shortness of breath, headache, blurred vision and nausea  She complains of two month history of productive cough.  She has taken doxycycline.  She complains of worsening low back pain on the left.  She also complains of left upper abdominal pain.  Pain is independent of meals.  She also complains of persistent redness and dry, peeling skin of bilateral lower extremities.  She reports mild improvement with doxycycline.  She reports a fall about a month ago.  The patient states she thinks she turned too quickly and lost her balance.  She has not taken any blood pressure medication.    PAST MEDICAL HISTORY:  Past Medical History:   Diagnosis Date    Anxiety     Breast cancer 1/2013    left breast- invasive mammary carcinoma, ER/[place] positive, Her2 negative    Choledocholithiasis     s/p ERCP and stent placement    Colon cancer 2001    CRI (chronic renal insufficiency)     one kidney    GERD (gastroesophageal reflux disease)     History of acute pancreatitis 2009 2/09 s/p sphincterotomy    History of colon cancer     s/p sigmoid colectomy    HTN (hypertension), benign     Hyperlipidemia     Intracerebral hemorrhage     Kidney stone     left    Obesity     Pancreatitis     Pancreatitis 2008    Stroke 12/2012    Tobacco abuse     Type 2 diabetes mellitus with neurological manifestations, controlled        SURGICAL HISTORY:  Past Surgical History:   Procedure Laterality Date    BREAST BIOPSY  1/2012    left breast invasive mammary carcinoma (lateral bx) and intraductal papilloma (medial bx)    BREAST BIOPSY  1999    rt breast FA    CHOLECYSTECTOMY  2001    COLON SURGERY      HERNIA REPAIR      left nephrectomy      atrophic kidney and hydronephrosis    left oopherectomy  2001    MASTECTOMY  2013    left     NEPHRECTOMY  2011    lap    SIGMOIDECTOMY  2001    TOTAL REDUCTION MAMMOPLASTY Right 2013       MEDS:  Medcard reviewed and updated    ALLERGIES: Allergy Card reviewed and updated    SOCIAL HISTORY:   The patient is a nonsmoker.    PE:   APPEARANCE: Well nourished, well developed, in no acute distress.    CHEST: Lungs clear to auscultation with unlabored respirations.  CARDIOVASCULAR: Normal S1, S2. No murmurs. No carotid bruits. No pedal edema.  ABDOMEN: Bowel sounds normal. Not distended. Soft. No tenderness or masses.   MUSCULOSKELETAL:  Abnormal gait.  SKIN: Erythematous, dry skin of bilateral lower extremities.  PSYCHIATRIC: The patient is oriented to person, place, and time and has a pleasant affect.        ASSESSMENT/PLAN:  Kylie was seen today for 3 month f/u and diabetes.    Diagnoses and all orders for this visit:    Essential hypertension  -     Comprehensive metabolic panel; Future  -     Lipid panel; Future  -     carvedilol (COREG) 25 MG tablet; Take 1 tablet (25 mg total) by mouth 2 (two) times daily.  -     hydrALAZINE (APRESOLINE) 50 MG tablet; Take 1 tablet (50 mg total) by mouth every 8 (eight) hours.  -     lisinopril (PRINIVIL,ZESTRIL) 40 MG tablet; Take 1 tablet (40 mg total) by mouth once daily.  -     Blood pressure is elevated, resume medications    Uncontrolled type 2 diabetes mellitus with stage 4 chronic kidney disease, with long-term current use of insulin  -     Comprehensive metabolic panel; Future  -     Hemoglobin A1c; Future  -     Lipid panel; Future    Dermatitis  -     Start clindamycin    Depression, unspecified depression type  -     citalopram (CELEXA) 20 MG tablet; Take 1 tablet (20 mg total) by mouth nightly.    Tethered cord  -     gabapentin (NEURONTIN) 400 MG capsule; Take 1 capsule (400 mg total) by mouth 2 (two) times daily.    Spondylosis without myelopathy  -     gabapentin (NEURONTIN) 400 MG capsule; Take 1 capsule (400 mg total) by mouth 2 (two) times  daily.    DDD (degenerative disc disease), lumbosacral  -     gabapentin (NEURONTIN) 400 MG capsule; Take 1 capsule (400 mg total) by mouth 2 (two) times daily.    Spinal stenosis of lumbar region with neurogenic claudication  -     gabapentin (NEURONTIN) 400 MG capsule; Take 1 capsule (400 mg total) by mouth 2 (two) times daily.    Thoracic and lumbosacral neuritis  -     gabapentin (NEURONTIN) 400 MG capsule; Take 1 capsule (400 mg total) by mouth 2 (two) times daily.    Acquired spondylolisthesis  -     gabapentin (NEURONTIN) 400 MG capsule; Take 1 capsule (400 mg total) by mouth 2 (two) times daily.    Bilateral low back pain without sciatica, unspecified chronicity  -     gabapentin (NEURONTIN) 400 MG capsule; Take 1 capsule (400 mg total) by mouth 2 (two) times daily.    Mixed urge and stress incontinence  -     oxybutynin (DITROPAN XL) 15 MG TR24; Take 1 tablet (15 mg total) by mouth once daily.    Nocturia  -     oxybutynin (DITROPAN XL) 15 MG TR24; Take 1 tablet (15 mg total) by mouth once daily.    Hyperlipidemia, unspecified hyperlipidemia type  -     pravastatin (PRAVACHOL) 40 MG tablet; Take 1 tablet (40 mg total) by mouth every evening.    Other orders  -     Influenza - Quadrivalent (3 years & older) (PF)  -     mupirocin (BACTROBAN) 2 % ointment; Apply topically 3 (three) times daily.  -     clindamycin (CLEOCIN) 150 MG capsule; Take 1 capsule (150 mg total) by mouth every 8 (eight) hours.  -     amLODIPine (NORVASC) 10 MG tablet; Take 1 tablet (10 mg total) by mouth every evening.  -     furosemide (LASIX) 40 MG tablet; Take 1 tablet (40 mg total) by mouth once daily.  -     insulin lispro (HUMALOG KWIKPEN) 100 unit/mL InPn pen; BLOOD GLUCOSE 150-200, INJECT 15 UNITS UNDER THE SKIN, 201-250, 18 UNITS, 251-300, 20 UNITS THREE TIMES A DAY AS DIRECTED  -     insulin detemir (LEVEMIR FLEXTOUCH) 100 unit/mL (3 mL) SubQ InPn pen; INJECT 40 UNITS UNDER THE SKIN TWICE A DAY  -     letrozole (FEMARA) 2.5 mg  Tab; Take 1 tablet (2.5 mg total) by mouth every evening.  -     ranitidine (ZANTAC) 150 MG capsule; Take 1 capsule (150 mg total) by mouth nightly.

## 2018-01-29 ENCOUNTER — LAB VISIT (OUTPATIENT)
Dept: LAB | Facility: HOSPITAL | Age: 63
End: 2018-01-29
Attending: INTERNAL MEDICINE
Payer: COMMERCIAL

## 2018-01-29 DIAGNOSIS — I10 ESSENTIAL HYPERTENSION: ICD-10-CM

## 2018-01-29 LAB
ALBUMIN SERPL BCP-MCNC: 2.5 G/DL
ALP SERPL-CCNC: 94 U/L
ALT SERPL W/O P-5'-P-CCNC: 16 U/L
ANION GAP SERPL CALC-SCNC: 10 MMOL/L
AST SERPL-CCNC: 14 U/L
BILIRUB SERPL-MCNC: 0.4 MG/DL
BUN SERPL-MCNC: 28 MG/DL
CALCIUM SERPL-MCNC: 8.8 MG/DL
CHLORIDE SERPL-SCNC: 111 MMOL/L
CHOLEST SERPL-MCNC: 187 MG/DL
CHOLEST/HDLC SERPL: 4.2 {RATIO}
CO2 SERPL-SCNC: 22 MMOL/L
CREAT SERPL-MCNC: 2.5 MG/DL
EST. GFR  (AFRICAN AMERICAN): 22.9 ML/MIN/1.73 M^2
EST. GFR  (NON AFRICAN AMERICAN): 19.9 ML/MIN/1.73 M^2
ESTIMATED AVG GLUCOSE: 186 MG/DL
GLUCOSE SERPL-MCNC: 141 MG/DL
HBA1C MFR BLD HPLC: 8.1 %
HDLC SERPL-MCNC: 45 MG/DL
HDLC SERPL: 24.1 %
LDLC SERPL CALC-MCNC: 107.6 MG/DL
NONHDLC SERPL-MCNC: 142 MG/DL
POTASSIUM SERPL-SCNC: 4.2 MMOL/L
PROT SERPL-MCNC: 6.4 G/DL
SODIUM SERPL-SCNC: 143 MMOL/L
TRIGL SERPL-MCNC: 172 MG/DL

## 2018-01-29 PROCEDURE — 80053 COMPREHEN METABOLIC PANEL: CPT

## 2018-01-29 PROCEDURE — 36415 COLL VENOUS BLD VENIPUNCTURE: CPT | Mod: PO

## 2018-01-29 PROCEDURE — 83036 HEMOGLOBIN GLYCOSYLATED A1C: CPT

## 2018-01-29 PROCEDURE — 80061 LIPID PANEL: CPT

## 2018-02-02 ENCOUNTER — TELEPHONE (OUTPATIENT)
Dept: INTERNAL MEDICINE | Facility: CLINIC | Age: 63
End: 2018-02-02

## 2018-02-02 NOTE — TELEPHONE ENCOUNTER
----- Message from Afsaneh Guzmán sent at 2/1/2018  7:30 AM CST -----  Contact: Pt at [phone]  Sooner appointment than the  can schedule.    When is the first available appointment: 5/7  What is the nature of the appointment: 6 week f/u  What visit type: ep  Patient preference of timeframe to be scheduled:  ASAP  Comments:

## 2018-02-16 ENCOUNTER — TELEPHONE (OUTPATIENT)
Dept: PODIATRY | Facility: CLINIC | Age: 63
End: 2018-02-16

## 2018-02-16 NOTE — TELEPHONE ENCOUNTER
----- Message from Ally Saucedo sent at 2/16/2018  4:12 PM CST -----  Contact: self  Patient ask for a call in regards to scheduling an appointment for 3/14 due to have another appointment that day with Dr Foote at 11:20a it can be before or after already schedule appointment. Patient can be reached at        Note: Tried to schedule appointment however received this error message-Not enough providers with templates for ESTABLISHED PATIENT.

## 2018-02-21 ENCOUNTER — OFFICE VISIT (OUTPATIENT)
Dept: PODIATRY | Facility: CLINIC | Age: 63
End: 2018-02-21
Payer: COMMERCIAL

## 2018-02-21 VITALS
HEART RATE: 74 BPM | BODY MASS INDEX: 47.46 KG/M2 | WEIGHT: 278 LBS | SYSTOLIC BLOOD PRESSURE: 167 MMHG | DIASTOLIC BLOOD PRESSURE: 72 MMHG | HEIGHT: 64 IN

## 2018-02-21 DIAGNOSIS — E66.01 MORBID OBESITY WITH BMI OF 45.0-49.9, ADULT: ICD-10-CM

## 2018-02-21 DIAGNOSIS — Z89.429 STATUS POST AMPUTATION OF LESSER TOE, UNSPECIFIED LATERALITY: ICD-10-CM

## 2018-02-21 DIAGNOSIS — L97.509 ULCER OF FOOT, UNSPECIFIED LATERALITY, UNSPECIFIED ULCER STAGE: Primary | ICD-10-CM

## 2018-02-21 PROCEDURE — 11042 DBRDMT SUBQ TIS 1ST 20SQCM/<: CPT | Mod: S$GLB,,, | Performed by: PODIATRIST

## 2018-02-21 PROCEDURE — 99999 PR PBB SHADOW E&M-EST. PATIENT-LVL III: CPT | Mod: PBBFAC,,, | Performed by: PODIATRIST

## 2018-02-21 PROCEDURE — 3008F BODY MASS INDEX DOCD: CPT | Mod: S$GLB,,, | Performed by: PODIATRIST

## 2018-02-21 PROCEDURE — 99213 OFFICE O/P EST LOW 20 MIN: CPT | Mod: 25,S$GLB,, | Performed by: PODIATRIST

## 2018-02-21 NOTE — PROGRESS NOTES
Subjective:    Patient ID: Kylie King is a 62 y.o. female.    Chief Complaint: No chief complaint on file.      HPI:   Pt presents for DM foot exam  She is s/p L 3rd toe distal phalanx amputation for osteomyelitis last november  Pt reports new R foot ulcer  Pt has no other complaints    This patient has documented high risk feet requiring routine maintenance secondary to diabetes mellitis and those secondary complications of diabetes, as mentioned.    PCP: Virginia Foote MD    Date Last Seen by PCP: inepic  Current shoe gear:  Affected Foot: Extra depth shoes with custome accommodative insoles       Hemoglobin A1C   Date Value Ref Range Status   01/29/2018 8.1 (H) 4.0 - 5.6 % Final     Comment:     According to ADA guidelines, hemoglobin A1c <7.0% represents  optimal control in non-pregnant diabetic patients. Different  metrics may apply to specific patient populations.   Standards of Medical Care in Diabetes-2016.  For the purpose of screening for the presence of diabetes:  <5.7%     Consistent with the absence of diabetes  5.7-6.4%  Consistent with increasing risk for diabetes   (prediabetes)  >or=6.5%  Consistent with diabetes  Currently, no consensus exists for use of hemoglobin A1c  for diagnosis of diabetes for children.  This Hemoglobin A1c assay has significant interference with fetal   hemoglobin   (HbF). The results are invalid for patients with abnormal amounts of   HbF,   including those with known Hereditary Persistence   of Fetal Hemoglobin. Heterozygous hemoglobin variants (HbAS, HbAC,   HbAD, HbAE, HbA2) do not significantly interfere with this assay;   however, presence of multiple variants in a sample may impact the %   interference.     03/17/2017 10.4 (H) 4.5 - 6.2 % Final     Comment:     According to ADA guidelines, hemoglobin A1C <7.0% represents  optimal control in non-pregnant diabetic patients.  Different  metrics may apply to specific populations.   Standards of Medical Care in  Diabetes - 2016.  For the purpose of screening for the presence of diabetes:  <5.7%     Consistent with the absence of diabetes  5.7-6.4%  Consistent with increasing risk for diabetes   (prediabetes)  >or=6.5%  Consistent with diabetes  Currently no consensus exists for use of hemoglobin A1C  for diagnosis of diabetes for children.     05/17/2016 11.4 (H) 4.5 - 6.2 % Final         Past Medical History:   Diagnosis Date    Anxiety     Breast cancer 1/2013    left breast- invasive mammary carcinoma, ER/[place] positive, Her2 negative    Choledocholithiasis     s/p ERCP and stent placement    Colon cancer 2001    CRI (chronic renal insufficiency)     one kidney    GERD (gastroesophageal reflux disease)     History of acute pancreatitis 2009 2/09 s/p sphincterotomy    History of colon cancer     s/p sigmoid colectomy    HTN (hypertension), benign     Hyperlipidemia     Intracerebral hemorrhage     Kidney stone     left    Obesity     Pancreatitis     Pancreatitis 2008    Stroke 12/2012    Tobacco abuse     Type 2 diabetes mellitus with neurological manifestations, controlled      Past Surgical History:   Procedure Laterality Date    BREAST BIOPSY  1/2012    left breast invasive mammary carcinoma (lateral bx) and intraductal papilloma (medial bx)    BREAST BIOPSY  1999    rt breast FA    CHOLECYSTECTOMY  2001    COLON SURGERY      HERNIA REPAIR      left nephrectomy      atrophic kidney and hydronephrosis    left oopherectomy  2001    MASTECTOMY  2013    left    NEPHRECTOMY  2011    lap    SIGMOIDECTOMY  2001    TOTAL REDUCTION MAMMOPLASTY Right 2013     Social History     Social History    Marital status: Single     Spouse name: N/A    Number of children: 2    Years of education: N/A     Occupational History    not working [employer]     Social History Main Topics    Smoking status: Current Every Day Smoker     Packs/day: 0.50     Types: Cigarettes    Smokeless tobacco: Never Used     "  Comment: anxious    Alcohol use No    Drug use: No    Sexual activity: No     Other Topics Concern    Not on file     Social History Narrative    She is not exercising regularly         Current Outpatient Prescriptions:     acetaminophen-codeine 300-30mg (TYLENOL-CODEINE #3) 300-30 mg Tab, Take 1 tablet by mouth every 6 (six) hours as needed., Disp: 90 tablet, Rfl: 2    amLODIPine (NORVASC) 10 MG tablet, Take 1 tablet (10 mg total) by mouth every evening., Disp: 90 tablet, Rfl: 3    BD INSULIN PEN NEEDLE UF MINI 31 gauge x 3/16" Ndle, USE 5 NEEDLES PER DAY, Disp: 450 each, Rfl: 2    blood sugar diagnostic (ONETOUCH ULTRA TEST) Strp, 1 strip by Misc.(Non-Drug; Combo Route) route 4 (four) times daily before meals and nightly., Disp: 400 strip, Rfl: 4    carvedilol (COREG) 25 MG tablet, Take 1 tablet (25 mg total) by mouth 2 (two) times daily., Disp: 180 tablet, Rfl: 4    citalopram (CELEXA) 20 MG tablet, Take 1 tablet (20 mg total) by mouth nightly., Disp: 90 tablet, Rfl: 3    clindamycin (CLEOCIN) 150 MG capsule, Take 1 capsule (150 mg total) by mouth every 8 (eight) hours., Disp: 30 capsule, Rfl: 0    furosemide (LASIX) 40 MG tablet, Take 1 tablet (40 mg total) by mouth once daily., Disp: 90 tablet, Rfl: 4    gabapentin (NEURONTIN) 400 MG capsule, Take 1 capsule (400 mg total) by mouth 2 (two) times daily., Disp: 180 capsule, Rfl: 3    hydrALAZINE (APRESOLINE) 50 MG tablet, Take 1 tablet (50 mg total) by mouth every 8 (eight) hours., Disp: 270 tablet, Rfl: 3    insulin detemir (LEVEMIR FLEXTOUCH) 100 unit/mL (3 mL) SubQ InPn pen, INJECT 40 UNITS UNDER THE SKIN TWICE A DAY, Disp: 75 mL, Rfl: 3    insulin lispro (HUMALOG KWIKPEN) 100 unit/mL InPn pen, BLOOD GLUCOSE 150-200, INJECT 15 UNITS UNDER THE SKIN, 201-250, 18 UNITS, 251-300, 20 UNITS THREE TIMES A DAY AS DIRECTED, Disp: 45 mL, Rfl: 3    insulin needles, disposable, (PEN NEEDLE, DIABETIC) 31 Ndle, Patient needs 5 needles a day, Disp: 500 " "each, Rfl: 3    insulin needles, disposable, 32 x 5/32 " Ndle, 1 Device by Misc.(Non-Drug; Combo Route) route 5 (five) times daily., Disp: 400 each, Rfl: 6    lancets (ONETOUCH DELICA LANCETS) 33 gauge Misc, 1 lancet by Misc.(Non-Drug; Combo Route) route 4 (four) times daily before meals and nightly., Disp: 400 each, Rfl: 4    letrozole (FEMARA) 2.5 mg Tab, Take 1 tablet (2.5 mg total) by mouth every evening., Disp: 90 tablet, Rfl: 3    lisinopril (PRINIVIL,ZESTRIL) 40 MG tablet, Take 1 tablet (40 mg total) by mouth once daily., Disp: 90 tablet, Rfl: 3    mupirocin (BACTROBAN) 2 % ointment, Apply topically 3 (three) times daily., Disp: 30 g, Rfl: 1    pravastatin (PRAVACHOL) 40 MG tablet, Take 1 tablet (40 mg total) by mouth every evening., Disp: 90 tablet, Rfl: 3    ranitidine (ZANTAC) 150 MG capsule, Take 1 capsule (150 mg total) by mouth nightly., Disp: 90 capsule, Rfl: 3    oxybutynin (DITROPAN XL) 15 MG TR24, Take 1 tablet (15 mg total) by mouth once daily., Disp: 90 tablet, Rfl: 4    Current Facility-Administered Medications:     cefTRIAXone injection 2 g, 2 g, Intramuscular, Once, Spencer Roldan MD    lidocaine HCL 10 mg/ml (1%) injection 1 mL, 1 mL, Intramuscular, Once, Spencer Roldan MD  Review of patient's allergies indicates:   Allergen Reactions    Cyclobenzaprine Other (See Comments)     Patient stated causes hallucinations.     Erythromycin      Other reaction(s): Stomach upset    Keflex [cephalexin]      Yeast infection       BP (!) 167/72   Pulse 74   Ht 5' 4" (1.626 m)   Wt 126.1 kg (278 lb)   BMI 47.72 kg/m²     ROS    Vascular: positive for edema  Musculoskeletal: positive for joint pain, joint edema  Skin: positive for lesions  Neurological: positive for burning, tingling, numbness  Gastrointestinal: negative for stomach pain, nausea and vomiting    ROS Constitutional:  General Appearance: well nourished        Objective:        Ortho/SPM Exam  Physical Exam    V: DP 1/4, " "PT 1/4, CRT< 3s to all digits tested.     N: Sensation diminished to all distributions    Ortho:  +Motor EHL/FHL/TA/GA   L 3rd toe amp at dipj noted -  Well healed   Compartments soft/compressible. No pain on passive stretch of big toe. No calf  pain.    Derm:     Ulcer Location: R heel  Measurements: 2x2x.5cm  Periwound: intact  Drainage: intact  Malodor: none  Base:  fibrotic  Signs of infection: none      Assessment:     Imagin. Ulcer of foot, unspecified laterality, unspecified ulcer stage    2. DM type 2, uncontrolled, with neuropathy    3. Uncontrolled type 2 diabetes mellitus with diabetic nephropathy, with long-term current use of insulin    4. Morbid obesity with BMI of 45.0-49.9, adult    5. Status post amputation of lesser toe, unspecified laterality          Plan:       Orders Placed This Encounter    Debridement    X-Ray Foot Complete Right    CBC auto differential    Sedimentation rate, manual    C-reactive protein    Procalcitonin    Comprehensive metabolic panel     .   Diagnoses and all orders for this visit:    Ulcer of foot, unspecified laterality, unspecified ulcer stage  -     X-Ray Foot Complete Right; Future  -     CBC auto differential; Future  -     Sedimentation rate, manual; Future  -     C-reactive protein; Future  -     Procalcitonin; Future  -     Comprehensive metabolic panel; Future  -     Debridement    DM type 2, uncontrolled, with neuropathy    Uncontrolled type 2 diabetes mellitus with diabetic nephropathy, with long-term current use of insulin    Morbid obesity with BMI of 45.0-49.9, adult    Status post amputation of lesser toe, unspecified laterality    Wound Debridement  Date/Time: 2018 4:14 PM  Performed by: СВЕТЛАНА TURPIN JR.  Authorized by: СВЕТЛАНА TURPIN JR.     Time out: Immediately prior to procedure a "time out" was called to verify the correct patient, procedure, equipment, support staff and site/side marked as required.    Consent " Done?:  Yes (Verbal)    Preparation: Patient was prepped and draped in usual sterile fashion    Local anesthesia used?: No      Wound Details:    Location:  Right foot    Location:  Right Heel    Type of Debridement:  Excisional       Length (cm):  2       Area (sq cm):  4       Width (cm):  2       Percent Debrided (%):  100       Depth (cm):  0.5       Total Area Debrided (sq cm):  4    Depth of debridement:  Subcutaneous tissue    Tissue debrided:  Epidermis and Dermis    Devitalized tissue debrided:  Biofilm, Callus and Fibrin    Instruments:  Blade    Bleeding:  Minimal  Hemostasis Achieved: Yes    Method Used:  Pressure and Silver Nitrate  Patient tolerance:  Patient tolerated the procedure well with no immediate complications      Kylie King is a 62 y.o. female presenting w/   1. Ulcer of foot, unspecified laterality, unspecified ulcer stage    2. DM type 2, uncontrolled, with neuropathy    3. Uncontrolled type 2 diabetes mellitus with diabetic nephropathy, with long-term current use of insulin    4. Morbid obesity with BMI of 45.0-49.9, adult    5. Status post amputation of lesser toe, unspecified laterality      ADA Risk Classification: Hx of ulcer or amputation - 3: rtc 1-2 months    -pt seen, evaluated, and managed  -dx discussed in detail. All questions/concerns addressed  -pt informed of dx and risk of limb loss  -px as above  -labs and XRs on way out    Dressings: hydrofera blue+football  Shoe: darco    Short-term goals include but are not limited to: maintaining good offloading and minimizing bioburden, promoting granulation and epithelialization to healing.  Wound healing is a medically reasonable expectation based on the clinical circumstances documented.    Long-term goals include but are not limited to: keeping the wound healed by good offloading and medical management under the direction of internist.    Advised pt of risks of current condition including but not limited to: worsening of  infection, potential for limb loss    Follow-up:Patient is to return to the clinic in one week for follow-up but should call [facility] immediately if any signs of infection, such as fever, chills, sweats, increased redness or pain.      Follow-up in about 1 week (around 2/28/2018).

## 2018-02-21 NOTE — PATIENT INSTRUCTIONS
Ulcers    Ulcers, which are open sores in the skin, occur when the outer layers of the skin are injured and the deeper tissues become exposed. They can be caused by excess pressure due to ill-fitting shoes, long periods in bed or after an injury that breaks the skin. Ulcers are commonly seen in patients living with diabetes, neuropathy or vascular disease. Open wounds can put patients at increased risk of developing infection in the skin and bone.    The signs and symptoms of ulcers may include drainage, odor or red, inflamed, thickened tissue. Pain may or may not be present.    Diagnosis may include x-rays to evaluate possible bone involvement. Other advanced imaging studies may also be ordered to evaluate for vascular disease, which may affect a patients ability to heal the wound.    Ulcers are treated by removing the unhealthy tissue and performing local wound care to assist in healing. Special shoes or padding may be used to remove excess pressure on the area. If infection is present, antibiotics will be necessary. In severe cases that involve extensive infection or are slow to heal, surgery or other advanced wound care treatments may be necessary.          Diabetes: Treating Severe Foot Infections    Uncontrolled diabetes and diabetes complications make it harder for the body to heal. Even minor foot problems can develop into serious infections. If not treated, infections can lead to amputation. In some cases, they can even become life-threatening. Prompt treatment by your healthcare provider is needed to protect your foot and restore your health.  Get treatment  In some cases, infections can spread through the feet and up the leg. To treat a severe infection, you may be hospitalized and given intravenous (IV) antibiotics. You may also be referred to healthcare providers who specialize in treating infections. If the infection is a serious risk to your health, surgery may be recommended.  The goals of  surgery  The goal of surgery is to remove the infection and protect your foot or leg. Some surgeries remove a small amount of dead tissue from the infected area. In some cases, toes or larger amounts of tissue may be removed. Surgery may be done in a hospital or wound care facility. The length of your stay depends on the surgery and how well youre healing. During recovery, you will likely need to limit activity for a while. You may also have visits from a home healthcare nurse. Be sure to see your healthcare provider for follow-up appointments.  Wound care  Suggestions include the following:  · Regular wound care after surgery helps keep your foot free of infection and aids healing.  · Change your dressing every 6 hours, or as directed by your healthcare provider.  · You may need IV (intravenous) antibiotics to help control the infection. Other medicines may be used to help your foot heal more quickly.  · Do what you can to keep your blood sugar within your target range. This will also help wound healing.   · A home care nurse may shorten your hospital stay by helping with your dressings or IV antibiotics at home.  · If needed, your healthcare provider may refer you to a wound care facility. These are medical facilities that specialize in treating ulcers and infections that are hard to heal. While youre there, you may work with several kinds of healthcare providers. You may also be given antibiotics or other medicines that help fight infection. Part of your treatment also includes learning to care for the wound at home.  · You may be told to keep your foot elevated as much as possible. You may also be told to avoid putting weight on the foot.  Date Last Reviewed: 6/1/2016  © 9566-9470 [employer]. [address] [place] [address], [place] [address]. All rights reserved. This information is not intended as a substitute for professional medical care. Always follow your healthcare professional's  instructions.

## 2018-02-27 ENCOUNTER — HOSPITAL ENCOUNTER (OUTPATIENT)
Dept: RADIOLOGY | Facility: HOSPITAL | Age: 63
Discharge: HOME | End: 2018-02-27
Attending: PODIATRIST
Payer: COMMERCIAL

## 2018-02-27 DIAGNOSIS — L97.509 ULCER OF FOOT, UNSPECIFIED LATERALITY, UNSPECIFIED ULCER STAGE: ICD-10-CM

## 2018-02-27 PROCEDURE — 73630 X-RAY EXAM OF FOOT: CPT | Mod: TC,PO,RT

## 2018-02-27 PROCEDURE — 73630 X-RAY EXAM OF FOOT: CPT | Mod: 26,RT,, | Performed by: RADIOLOGY

## 2018-03-05 ENCOUNTER — OFFICE VISIT (OUTPATIENT)
Dept: PODIATRY | Facility: CLINIC | Age: 63
End: 2018-03-05
Payer: COMMERCIAL

## 2018-03-05 VITALS
HEIGHT: 64 IN | WEIGHT: 293 LBS | DIASTOLIC BLOOD PRESSURE: 69 MMHG | RESPIRATION RATE: 18 BRPM | BODY MASS INDEX: 50.02 KG/M2 | HEART RATE: 72 BPM | SYSTOLIC BLOOD PRESSURE: 140 MMHG

## 2018-03-05 DIAGNOSIS — N18.30 CKD (CHRONIC KIDNEY DISEASE) STAGE 3, GFR 30-59 ML/MIN: ICD-10-CM

## 2018-03-05 DIAGNOSIS — E66.01 MORBID OBESITY WITH BMI OF 45.0-49.9, ADULT: ICD-10-CM

## 2018-03-05 DIAGNOSIS — L97.509 ULCER OF FOOT, UNSPECIFIED LATERALITY, UNSPECIFIED ULCER STAGE: Primary | ICD-10-CM

## 2018-03-05 PROCEDURE — 11042 DBRDMT SUBQ TIS 1ST 20SQCM/<: CPT | Mod: S$GLB,,, | Performed by: PODIATRIST

## 2018-03-05 PROCEDURE — 99213 OFFICE O/P EST LOW 20 MIN: CPT | Mod: 25,S$GLB,, | Performed by: PODIATRIST

## 2018-03-05 NOTE — PATIENT INSTRUCTIONS
Ulcers    Ulcers, which are open sores in the skin, occur when the outer layers of the skin are injured and the deeper tissues become exposed. They can be caused by excess pressure due to ill-fitting shoes, long periods in bed or after an injury that breaks the skin. Ulcers are commonly seen in patients living with diabetes, neuropathy or vascular disease. Open wounds can put patients at increased risk of developing infection in the skin and bone.    The signs and symptoms of ulcers may include drainage, odor or red, inflamed, thickened tissue. Pain may or may not be present.    Diagnosis may include x-rays to evaluate possible bone involvement. Other advanced imaging studies may also be ordered to evaluate for vascular disease, which may affect a patients ability to heal the wound.    Ulcers are treated by removing the unhealthy tissue and performing local wound care to assist in healing. Special shoes or padding may be used to remove excess pressure on the area. If infection is present, antibiotics will be necessary. In severe cases that involve extensive infection or are slow to heal, surgery or other advanced wound care treatments may be necessary.          Diabetes: Treating Severe Foot Infections    Uncontrolled diabetes and diabetes complications make it harder for the body to heal. Even minor foot problems can develop into serious infections. If not treated, infections can lead to amputation. In some cases, they can even become life-threatening. Prompt treatment by your healthcare provider is needed to protect your foot and restore your health.  Get treatment  In some cases, infections can spread through the feet and up the leg. To treat a severe infection, you may be hospitalized and given intravenous (IV) antibiotics. You may also be referred to healthcare providers who specialize in treating infections. If the infection is a serious risk to your health, surgery may be recommended.  The goals of  surgery  The goal of surgery is to remove the infection and protect your foot or leg. Some surgeries remove a small amount of dead tissue from the infected area. In some cases, toes or larger amounts of tissue may be removed. Surgery may be done in a hospital or wound care facility. The length of your stay depends on the surgery and how well youre healing. During recovery, you will likely need to limit activity for a while. You may also have visits from a home healthcare nurse. Be sure to see your healthcare provider for follow-up appointments.  Wound care  Suggestions include the following:  · Regular wound care after surgery helps keep your foot free of infection and aids healing.  · Change your dressing every 6 hours, or as directed by your healthcare provider.  · You may need IV (intravenous) antibiotics to help control the infection. Other medicines may be used to help your foot heal more quickly.  · Do what you can to keep your blood sugar within your target range. This will also help wound healing.   · A home care nurse may shorten your hospital stay by helping with your dressings or IV antibiotics at home.  · If needed, your healthcare provider may refer you to a wound care facility. These are medical facilities that specialize in treating ulcers and infections that are hard to heal. While youre there, you may work with several kinds of healthcare providers. You may also be given antibiotics or other medicines that help fight infection. Part of your treatment also includes learning to care for the wound at home.  · You may be told to keep your foot elevated as much as possible. You may also be told to avoid putting weight on the foot.  Date Last Reviewed: 6/1/2016  © 5856-7724 [employer]. [address] [place] [address], [place] [address]. All rights reserved. This information is not intended as a substitute for professional medical care. Always follow your healthcare professional's  instructions.

## 2018-03-05 NOTE — PROGRESS NOTES
Subjective:    Patient ID: Kylie King is a 62 y.o. female.    Chief Complaint: Wound Care      HPI:   Pt presents for f/u foot ulcer  She is s/p L 3rd toe distal phalanx amputation for osteomyelitis last november  Pt has no other complaints    This patient has documented high risk feet requiring routine maintenance secondary to diabetes mellitis and those secondary complications of diabetes, as mentioned.    PCP: Virginia Foote MD    Date Last Seen by PCP: inepic  Current shoe gear:  Affected Foot: Extra depth shoes with custome accommodative insoles       Hemoglobin A1C   Date Value Ref Range Status   01/29/2018 8.1 (H) 4.0 - 5.6 % Final     Comment:     According to ADA guidelines, hemoglobin A1c <7.0% represents  optimal control in non-pregnant diabetic patients. Different  metrics may apply to specific patient populations.   Standards of Medical Care in Diabetes-2016.  For the purpose of screening for the presence of diabetes:  <5.7%     Consistent with the absence of diabetes  5.7-6.4%  Consistent with increasing risk for diabetes   (prediabetes)  >or=6.5%  Consistent with diabetes  Currently, no consensus exists for use of hemoglobin A1c  for diagnosis of diabetes for children.  This Hemoglobin A1c assay has significant interference with fetal   hemoglobin   (HbF). The results are invalid for patients with abnormal amounts of   HbF,   including those with known Hereditary Persistence   of Fetal Hemoglobin. Heterozygous hemoglobin variants (HbAS, HbAC,   HbAD, HbAE, HbA2) do not significantly interfere with this assay;   however, presence of multiple variants in a sample may impact the %   interference.     03/17/2017 10.4 (H) 4.5 - 6.2 % Final     Comment:     According to ADA guidelines, hemoglobin A1C <7.0% represents  optimal control in non-pregnant diabetic patients.  Different  metrics may apply to specific populations.   Standards of Medical Care in Diabetes - 2016.  For the purpose of  screening for the presence of diabetes:  <5.7%     Consistent with the absence of diabetes  5.7-6.4%  Consistent with increasing risk for diabetes   (prediabetes)  >or=6.5%  Consistent with diabetes  Currently no consensus exists for use of hemoglobin A1C  for diagnosis of diabetes for children.     05/17/2016 11.4 (H) 4.5 - 6.2 % Final         Past Medical History:   Diagnosis Date    Anxiety     Breast cancer 1/2013    left breast- invasive mammary carcinoma, ER/ME positive, Her2 negative    Choledocholithiasis     s/p ERCP and stent placement    Colon cancer 2001    CRI (chronic renal insufficiency)     one kidney    GERD (gastroesophageal reflux disease)     History of acute pancreatitis 2009 2/09 s/p sphincterotomy    History of colon cancer     s/p sigmoid colectomy    HTN (hypertension), benign     Hyperlipidemia     Intracerebral hemorrhage     Kidney stone     left    Obesity     Pancreatitis     Pancreatitis 2008    Stroke 12/2012    Tobacco abuse     Type 2 diabetes mellitus with neurological manifestations, controlled      Past Surgical History:   Procedure Laterality Date    BREAST BIOPSY  1/2012    left breast invasive mammary carcinoma (lateral bx) and intraductal papilloma (medial bx)    BREAST BIOPSY  1999    rt breast FA    CHOLECYSTECTOMY  2001    COLON SURGERY      HERNIA REPAIR      left nephrectomy      atrophic kidney and hydronephrosis    left oopherectomy  2001    MASTECTOMY  2013    left    NEPHRECTOMY  2011    lap    SIGMOIDECTOMY  2001    TOTAL REDUCTION MAMMOPLASTY Right 2013     Social History     Social History    Marital status: Single     Spouse name: N/A    Number of children: 2    Years of education: N/A     Occupational History    not working [employer]     Social History Main Topics    Smoking status: Current Every Day Smoker     Packs/day: 0.50     Types: Cigarettes    Smokeless tobacco: Never Used      Comment: anxious    Alcohol use  "No    Drug use: No    Sexual activity: No     Other Topics Concern    Not on file     Social History Narrative    She is not exercising regularly         Current Outpatient Prescriptions:     acetaminophen-codeine 300-30mg (TYLENOL-CODEINE #3) 300-30 mg Tab, Take 1 tablet by mouth every 6 (six) hours as needed., Disp: 90 tablet, Rfl: 2    amLODIPine (NORVASC) 10 MG tablet, Take 1 tablet (10 mg total) by mouth every evening., Disp: 90 tablet, Rfl: 3    BD INSULIN PEN NEEDLE UF MINI 31 gauge x 3/16" Ndle, USE 5 NEEDLES PER DAY, Disp: 450 each, Rfl: 2    blood sugar diagnostic (ONETOUCH ULTRA TEST) Strp, 1 strip by Misc.(Non-Drug; Combo Route) route 4 (four) times daily before meals and nightly., Disp: 400 strip, Rfl: 4    carvedilol (COREG) 25 MG tablet, Take 1 tablet (25 mg total) by mouth 2 (two) times daily., Disp: 180 tablet, Rfl: 4    citalopram (CELEXA) 20 MG tablet, Take 1 tablet (20 mg total) by mouth nightly., Disp: 90 tablet, Rfl: 3    clindamycin (CLEOCIN) 150 MG capsule, Take 1 capsule (150 mg total) by mouth every 8 (eight) hours., Disp: 30 capsule, Rfl: 0    furosemide (LASIX) 40 MG tablet, Take 1 tablet (40 mg total) by mouth once daily., Disp: 90 tablet, Rfl: 4    gabapentin (NEURONTIN) 400 MG capsule, Take 1 capsule (400 mg total) by mouth 2 (two) times daily., Disp: 180 capsule, Rfl: 3    hydrALAZINE (APRESOLINE) 50 MG tablet, Take 1 tablet (50 mg total) by mouth every 8 (eight) hours., Disp: 270 tablet, Rfl: 3    insulin detemir (LEVEMIR FLEXTOUCH) 100 unit/mL (3 mL) SubQ InPn pen, INJECT 40 UNITS UNDER THE SKIN TWICE A DAY, Disp: 75 mL, Rfl: 3    insulin lispro (HUMALOG KWIKPEN) 100 unit/mL InPn pen, BLOOD GLUCOSE 150-200, INJECT 15 UNITS UNDER THE SKIN, 201-250, 18 UNITS, 251-300, 20 UNITS THREE TIMES A DAY AS DIRECTED, Disp: 45 mL, Rfl: 3    insulin needles, disposable, (PEN NEEDLE, DIABETIC) 31 Ndle, Patient needs 5 needles a day, Disp: 500 each, Rfl: 3    insulin needles, " "disposable, 32 x 5/32 " Ndle, 1 Device by Misc.(Non-Drug; Combo Route) route 5 (five) times daily., Disp: 400 each, Rfl: 6    lancets (ONETOUCH DELICA LANCETS) 33 gauge Misc, 1 lancet by Misc.(Non-Drug; Combo Route) route 4 (four) times daily before meals and nightly., Disp: 400 each, Rfl: 4    letrozole (FEMARA) 2.5 mg Tab, Take 1 tablet (2.5 mg total) by mouth every evening., Disp: 90 tablet, Rfl: 3    lisinopril (PRINIVIL,ZESTRIL) 40 MG tablet, Take 1 tablet (40 mg total) by mouth once daily., Disp: 90 tablet, Rfl: 3    mupirocin (BACTROBAN) 2 % ointment, Apply topically 3 (three) times daily., Disp: 30 g, Rfl: 1    pravastatin (PRAVACHOL) 40 MG tablet, Take 1 tablet (40 mg total) by mouth every evening., Disp: 90 tablet, Rfl: 3    ranitidine (ZANTAC) 150 MG capsule, Take 1 capsule (150 mg total) by mouth nightly., Disp: 90 capsule, Rfl: 3    oxybutynin (DITROPAN XL) 15 MG TR24, Take 1 tablet (15 mg total) by mouth once daily., Disp: 90 tablet, Rfl: 4    Current Facility-Administered Medications:     cefTRIAXone injection 2 g, 2 g, Intramuscular, Once, Spencer Roldan MD    lidocaine HCL 10 mg/ml (1%) injection 1 mL, 1 mL, Intramuscular, Once, Spencer Roldan MD  Review of patient's allergies indicates:   Allergen Reactions    Cyclobenzaprine Other (See Comments)     Patient stated causes hallucinations.     Erythromycin      Other reaction(s): Stomach upset    Keflex [cephalexin]      Yeast infection       BP (!) 140/69 (BP Location: Right arm, Patient Position: Sitting, BP Method: Large (Automatic))   Pulse 72   Resp 18   Ht 5' 4" (1.626 m)   Wt 136.1 kg (300 lb)   BMI 51.49 kg/m²     ROS    Vascular: positive for edema  Musculoskeletal: positive for joint pain, joint edema  Skin: positive for lesions  Neurological: positive for burning, tingling, numbness  Gastrointestinal: negative for stomach pain, nausea and vomiting    ROS Constitutional:  General Appearance: well nourished      "   Objective:        Ortho/SPM Exam  Physical Exam    V: DP 1/4, PT 1/4, CRT< 3s to all digits tested.     N: Sensation diminished to all distributions    Ortho:  +Motor EHL/FHL/TA/GA   L 3rd toe amp at dipj noted -  Well healed   Compartments soft/compressible. No pain on passive stretch of big toe. No calf  pain.    Derm: + heel ulceration    Ulcer Location: R heel  Measurements: 2x2x.5cm  Periwound: intact  Drainage: intact  Malodor: none  Base:  fibrotic  Signs of infection: none      Assessment:     Imaging:     X-ray Foot Complete Right    Result Date: 2/27/2018  Time of Procedure: 02/27/18 13:31:47 Accession # [id number] Comparison: None. Number of views: 3: AP, oblique and lateral views of the RIGHT foot through a boot or sleeve. Findings: There is a mildly comminuted oblique fracture of the distal diaphysis of the fourth metatarsal with 0.2 cm of medial displacement of the distal fragment. There is also a comminuted fracture of the distal diaphysis of the fifth metacarpal with 0.6 cm a dorsal medial displacement of the distal fragment of the fifth metatarsal as seen on oblique view. Bulky inferior and posterior calcaneal spurs noted. There is diffuse osteopenia.  I detect no lytic or blastic lesion and no radiopaque retained foreign body.    Please see above. Electronically signed by: Jesusita Terry MD Date:     02/27/18 Time:    13:53       Recent Labs  Lab Result Units 02/27/18  1340   Sed Rate mm/Hr 61*   CRP mg/L 6.6   WBC K/uL 6.57   Procalcitonin ng/mL 0.04           1. Ulcer of foot, unspecified laterality, unspecified ulcer stage    2. Morbid obesity with BMI of 45.0-49.9, adult    3. DM type 2, uncontrolled, with neuropathy    4. CKD (chronic kidney disease) stage 3, GFR 30-59 ml/min          Plan:       Orders Placed This Encounter    Debridement     .   Kylie was seen today for wound care.    Diagnoses and all orders for this visit:    Ulcer of foot, unspecified laterality, unspecified ulcer  "stage  -     Debridement    Morbid obesity with BMI of 45.0-49.9, adult    DM type 2, uncontrolled, with neuropathy    CKD (chronic kidney disease) stage 3, GFR 30-59 ml/min    Wound Debridement  Date/Time: 3/5/2018 10:39 AM  Performed by: СВЕТЛАНА TURPIN JR.  Authorized by: СВЕТЛАНА TURPIN JR.     Time out: Immediately prior to procedure a "time out" was called to verify the correct patient, procedure, equipment, support staff and site/side marked as required.    Consent Done?:  Yes (Verbal)  Local anesthesia used?: No      Wound Details:    Location:  Right foot    Location:  Right Heel    Type of Debridement:  Excisional       Length (cm):  2       Area (sq cm):  4       Width (cm):  2       Percent Debrided (%):  100       Depth (cm):  0.5       Total Area Debrided (sq cm):  4    Depth of debridement:  Subcutaneous tissue    Tissue debrided:  Dermis and Epidermis    Devitalized tissue debrided:  Fibrin, Exudate and Biofilm    Instruments:  Blade    Bleeding:  Minimal  Hemostasis Achieved: Yes    Method Used:  Pressure and Silver Nitrate  Patient tolerance:  Patient tolerated the procedure well with no immediate complications      Kylie King is a 62 y.o. female presenting w/   1. Ulcer of foot, unspecified laterality, unspecified ulcer stage    2. Morbid obesity with BMI of 45.0-49.9, adult    3. DM type 2, uncontrolled, with neuropathy    4. CKD (chronic kidney disease) stage 3, GFR 30-59 ml/min      ADA Risk Classification: Hx of ulcer or amputation - 3: rtc 1-2 months    -pt seen, evaluated, and managed  -dx discussed in detail. All questions/concerns addressed  -pt informed of dx and risk of limb loss  -px as above  -labs and XRs reviewed: no OM in area of open ulcer. Also obs MT fxs - pt is nwb in football dressings  -NWB    Dressings: hydrofera blue+football  Shoe: darco    Short-term goals include but are not limited to: maintaining good offloading and minimizing bioburden, promoting granulation " and epithelialization to healing.  Wound healing is a medically reasonable expectation based on the clinical circumstances documented.    Long-term goals include but are not limited to: keeping the wound healed by good offloading and medical management under the direction of internist.    Advised pt of risks of current condition including but not limited to: worsening of infection, potential for limb loss    Follow-up:Patient is to return to the clinic in one week for follow-up but should call [facility] immediately if any signs of infection, such as fever, chills, sweats, increased redness or pain.      Follow-up in about 1 week (around 3/12/2018).

## 2018-03-12 ENCOUNTER — OFFICE VISIT (OUTPATIENT)
Dept: PODIATRY | Facility: CLINIC | Age: 63
End: 2018-03-12
Payer: COMMERCIAL

## 2018-03-12 VITALS
HEART RATE: 64 BPM | HEIGHT: 64 IN | DIASTOLIC BLOOD PRESSURE: 67 MMHG | WEIGHT: 293 LBS | BODY MASS INDEX: 50.02 KG/M2 | SYSTOLIC BLOOD PRESSURE: 151 MMHG

## 2018-03-12 DIAGNOSIS — N18.30 CKD (CHRONIC KIDNEY DISEASE) STAGE 3, GFR 30-59 ML/MIN: ICD-10-CM

## 2018-03-12 DIAGNOSIS — E66.01 MORBID OBESITY WITH BMI OF 45.0-49.9, ADULT: ICD-10-CM

## 2018-03-12 DIAGNOSIS — E11.621 DIABETIC ULCER OF RIGHT HEEL ASSOCIATED WITH TYPE 2 DIABETES MELLITUS, WITH FAT LAYER EXPOSED: Primary | ICD-10-CM

## 2018-03-12 DIAGNOSIS — L97.412 DIABETIC ULCER OF RIGHT HEEL ASSOCIATED WITH TYPE 2 DIABETES MELLITUS, WITH FAT LAYER EXPOSED: Primary | ICD-10-CM

## 2018-03-12 DIAGNOSIS — E11.42 DIABETIC PERIPHERAL NEUROPATHY ASSOCIATED WITH TYPE 2 DIABETES MELLITUS: ICD-10-CM

## 2018-03-12 DIAGNOSIS — Z89.429 STATUS POST AMPUTATION OF LESSER TOE, UNSPECIFIED LATERALITY: ICD-10-CM

## 2018-03-12 PROCEDURE — 99213 OFFICE O/P EST LOW 20 MIN: CPT | Mod: 25,S$GLB,, | Performed by: PODIATRIST

## 2018-03-12 PROCEDURE — 11042 DBRDMT SUBQ TIS 1ST 20SQCM/<: CPT | Mod: S$GLB,,, | Performed by: PODIATRIST

## 2018-03-12 NOTE — PROGRESS NOTES
Subjective:    Patient ID: Kylie King is a 62 y.o. female.    Chief Complaint: Follow-up (ulcer of right foot)      HPI:   Pt presents for f/u foot ulcer  She is s/p L 3rd toe distal phalanx amputation for osteomyelitis last november  Pt has no other complaints    This patient has documented high risk feet requiring routine maintenance secondary to diabetes mellitis and those secondary complications of diabetes, as mentioned.    PCP: Virginia Foote MD    Date Last Seen by PCP: inepic  Current shoe gear:  Affected Foot: Extra depth shoes with custome accommodative insoles       Hemoglobin A1C   Date Value Ref Range Status   01/29/2018 8.1 (H) 4.0 - 5.6 % Final     Comment:     According to ADA guidelines, hemoglobin A1c <7.0% represents  optimal control in non-pregnant diabetic patients. Different  metrics may apply to specific patient populations.   Standards of Medical Care in Diabetes-2016.  For the purpose of screening for the presence of diabetes:  <5.7%     Consistent with the absence of diabetes  5.7-6.4%  Consistent with increasing risk for diabetes   (prediabetes)  >or=6.5%  Consistent with diabetes  Currently, no consensus exists for use of hemoglobin A1c  for diagnosis of diabetes for children.  This Hemoglobin A1c assay has significant interference with fetal   hemoglobin   (HbF). The results are invalid for patients with abnormal amounts of   HbF,   including those with known Hereditary Persistence   of Fetal Hemoglobin. Heterozygous hemoglobin variants (HbAS, HbAC,   HbAD, HbAE, HbA2) do not significantly interfere with this assay;   however, presence of multiple variants in a sample may impact the %   interference.     03/17/2017 10.4 (H) 4.5 - 6.2 % Final     Comment:     According to ADA guidelines, hemoglobin A1C <7.0% represents  optimal control in non-pregnant diabetic patients.  Different  metrics may apply to specific populations.   Standards of Medical Care in Diabetes - 2016.  For  the purpose of screening for the presence of diabetes:  <5.7%     Consistent with the absence of diabetes  5.7-6.4%  Consistent with increasing risk for diabetes   (prediabetes)  >or=6.5%  Consistent with diabetes  Currently no consensus exists for use of hemoglobin A1C  for diagnosis of diabetes for children.     05/17/2016 11.4 (H) 4.5 - 6.2 % Final         Past Medical History:   Diagnosis Date    Anxiety     Breast cancer 1/2013    left breast- invasive mammary carcinoma, ER/OK positive, Her2 negative    Choledocholithiasis     s/p ERCP and stent placement    Colon cancer 2001    CRI (chronic renal insufficiency)     one kidney    GERD (gastroesophageal reflux disease)     History of acute pancreatitis 2009 2/09 s/p sphincterotomy    History of colon cancer     s/p sigmoid colectomy    HTN (hypertension), benign     Hyperlipidemia     Intracerebral hemorrhage     Kidney stone     left    Obesity     Pancreatitis     Pancreatitis 2008    Stroke 12/2012    Tobacco abuse     Type 2 diabetes mellitus with neurological manifestations, controlled      Past Surgical History:   Procedure Laterality Date    BREAST BIOPSY  1/2012    left breast invasive mammary carcinoma (lateral bx) and intraductal papilloma (medial bx)    BREAST BIOPSY  1999    rt breast FA    CHOLECYSTECTOMY  2001    COLON SURGERY      HERNIA REPAIR      left nephrectomy      atrophic kidney and hydronephrosis    left oopherectomy  2001    MASTECTOMY  2013    left    NEPHRECTOMY  2011    lap    SIGMOIDECTOMY  2001    TOTAL REDUCTION MAMMOPLASTY Right 2013     Social History     Social History    Marital status: Single     Spouse name: N/A    Number of children: 2    Years of education: N/A     Occupational History    not working [employer]     Social History Main Topics    Smoking status: Current Every Day Smoker     Packs/day: 0.50     Types: Cigarettes    Smokeless tobacco: Never Used      Comment: anxious  "   Alcohol use No    Drug use: No    Sexual activity: No     Other Topics Concern    Not on file     Social History Narrative    She is not exercising regularly         Current Outpatient Prescriptions:     acetaminophen-codeine 300-30mg (TYLENOL-CODEINE #3) 300-30 mg Tab, Take 1 tablet by mouth every 6 (six) hours as needed., Disp: 90 tablet, Rfl: 2    amLODIPine (NORVASC) 10 MG tablet, Take 1 tablet (10 mg total) by mouth every evening., Disp: 90 tablet, Rfl: 3    BD INSULIN PEN NEEDLE UF MINI 31 gauge x 3/16" Ndle, USE 5 NEEDLES PER DAY, Disp: 450 each, Rfl: 2    blood sugar diagnostic (ONETOUCH ULTRA TEST) Strp, 1 strip by Misc.(Non-Drug; Combo Route) route 4 (four) times daily before meals and nightly., Disp: 400 strip, Rfl: 4    carvedilol (COREG) 25 MG tablet, Take 1 tablet (25 mg total) by mouth 2 (two) times daily., Disp: 180 tablet, Rfl: 4    citalopram (CELEXA) 20 MG tablet, Take 1 tablet (20 mg total) by mouth nightly., Disp: 90 tablet, Rfl: 3    furosemide (LASIX) 40 MG tablet, Take 1 tablet (40 mg total) by mouth once daily., Disp: 90 tablet, Rfl: 4    gabapentin (NEURONTIN) 400 MG capsule, Take 1 capsule (400 mg total) by mouth 2 (two) times daily., Disp: 180 capsule, Rfl: 3    hydrALAZINE (APRESOLINE) 50 MG tablet, Take 1 tablet (50 mg total) by mouth every 8 (eight) hours., Disp: 270 tablet, Rfl: 3    insulin detemir (LEVEMIR FLEXTOUCH) 100 unit/mL (3 mL) SubQ InPn pen, INJECT 40 UNITS UNDER THE SKIN TWICE A DAY, Disp: 75 mL, Rfl: 3    insulin lispro (HUMALOG KWIKPEN) 100 unit/mL InPn pen, BLOOD GLUCOSE 150-200, INJECT 15 UNITS UNDER THE SKIN, 201-250, 18 UNITS, 251-300, 20 UNITS THREE TIMES A DAY AS DIRECTED, Disp: 45 mL, Rfl: 3    insulin needles, disposable, (PEN NEEDLE, DIABETIC) 31 Ndle, Patient needs 5 needles a day, Disp: 500 each, Rfl: 3    insulin needles, disposable, 32 x 5/32 " Ndle, 1 Device by Misc.(Non-Drug; Combo Route) route 5 (five) times daily., Disp: 400 each, " Rfl: 6    lancets (ONETOUCH DELICA LANCETS) 33 gauge Misc, 1 lancet by Misc.(Non-Drug; Combo Route) route 4 (four) times daily before meals and nightly., Disp: 400 each, Rfl: 4    letrozole (FEMARA) 2.5 mg Tab, Take 1 tablet (2.5 mg total) by mouth every evening., Disp: 90 tablet, Rfl: 3    lisinopril (PRINIVIL,ZESTRIL) 40 MG tablet, Take 1 tablet (40 mg total) by mouth once daily., Disp: 90 tablet, Rfl: 3    mupirocin (BACTROBAN) 2 % ointment, Apply topically 3 (three) times daily., Disp: 30 g, Rfl: 1    pravastatin (PRAVACHOL) 40 MG tablet, Take 1 tablet (40 mg total) by mouth every evening., Disp: 90 tablet, Rfl: 3    ranitidine (ZANTAC) 150 MG capsule, Take 1 capsule (150 mg total) by mouth nightly., Disp: 90 capsule, Rfl: 3    oxybutynin (DITROPAN XL) 15 MG TR24, Take 1 tablet (15 mg total) by mouth once daily., Disp: 90 tablet, Rfl: 4    Current Facility-Administered Medications:     cefTRIAXone injection 2 g, 2 g, Intramuscular, Once, Spencer Roldan MD    lidocaine HCL 10 mg/ml (1%) injection 1 mL, 1 mL, Intramuscular, Once, Spencer Roldan MD  Review of patient's allergies indicates:   Allergen Reactions    Cyclobenzaprine Other (See Comments)     Patient stated causes hallucinations.     Erythromycin      Other reaction(s): Stomach upset    Keflex [cephalexin]      Yeast infection       There were no vitals taken for this visit.    ROS    Vascular: positive for edema  Musculoskeletal: positive for joint pain, joint edema  Skin: positive for lesions  Neurological: positive for burning, tingling, numbness  Gastrointestinal: negative for stomach pain, nausea and vomiting    ROS Constitutional:  General Appearance: well nourished        Objective:        Ortho/SPM Exam  Physical Exam    V: DP 1/4, PT 1/4, CRT< 3s to all digits tested.     N: Sensation diminished to all distributions    Ortho:  +Motor EHL/FHL/TA/GA   L 3rd toe amp at dipj noted -  Well healed   Compartments soft/compressible.  No pain on passive stretch of big toe. No calf  pain.    Derm: + heel ulceration      Ulcer Location: R heel  Measurements: 2x1.5x.5cm  Periwound: intact  Drainage: intact  Malodor: none  Base:  fibrotic  Signs of infection: none      Assessment:     Imaging:     X-ray Foot Complete Right    Result Date: 2/27/2018  Time of Procedure: 02/27/18 13:31:47 Accession # [id number] Comparison: None. Number of views: 3: AP, oblique and lateral views of the RIGHT foot through a boot or sleeve. Findings: There is a mildly comminuted oblique fracture of the distal diaphysis of the fourth metatarsal with 0.2 cm of medial displacement of the distal fragment. There is also a comminuted fracture of the distal diaphysis of the fifth metacarpal with 0.6 cm a dorsal medial displacement of the distal fragment of the fifth metatarsal as seen on oblique view. Bulky inferior and posterior calcaneal spurs noted. There is diffuse osteopenia.  I detect no lytic or blastic lesion and no radiopaque retained foreign body.    Please see above. Electronically signed by: Jesusita Terry MD Date:     02/27/18 Time:    13:53       Recent Labs  Lab Result Units 02/27/18  1340   Sed Rate mm/Hr 61*   CRP mg/L 6.6   WBC K/uL 6.57   Procalcitonin ng/mL 0.04           1. Diabetic ulcer of right heel associated with type 2 diabetes mellitus, with fat layer exposed    2. Diabetic peripheral neuropathy associated with type 2 diabetes mellitus    3. CKD (chronic kidney disease) stage 3, GFR 30-59 ml/min    4. Morbid obesity with BMI of 45.0-49.9, adult    5. Status post amputation of lesser toe, unspecified laterality          Plan:       Orders Placed This Encounter    Debridement    Ambulatory Referral to Physical/Occupational Therapy     .   Kylie was seen today for follow-up.    Diagnoses and all orders for this visit:    Diabetic ulcer of right heel associated with type 2 diabetes mellitus, with fat layer exposed  -     Debridement  -     Ambulatory  "Referral to Physical/Occupational Therapy    Diabetic peripheral neuropathy associated with type 2 diabetes mellitus    CKD (chronic kidney disease) stage 3, GFR 30-59 ml/min    Morbid obesity with BMI of 45.0-49.9, adult    Status post amputation of lesser toe, unspecified laterality    Wound Debridement  Date/Time: 3/12/2018 10:00 AM  Performed by: СВЕТЛАНА TURPIN JR.  Authorized by: СВЕТЛАНА TURPIN JR.     Time out: Immediately prior to procedure a "time out" was called to verify the correct patient, procedure, equipment, support staff and site/side marked as required.    Consent Done?:  Yes (Verbal)    Preparation: Patient was prepped and draped in usual sterile fashion    Local anesthesia used?: No      Wound Details:    Location:  Right foot    Location:  Right Heel    Type of Debridement:  Excisional       Length (cm):  2       Area (sq cm):  3       Width (cm):  1.5       Percent Debrided (%):  100       Depth (cm):  0.5       Total Area Debrided (sq cm):  3    Depth of debridement:  Subcutaneous tissue    Tissue debrided:  Dermis, Epidermis and Hypergranulation    Devitalized tissue debrided:  Biofilm, Callus and Fibrin    Instruments:  Blade    Bleeding:  Minimal  Hemostasis Achieved: Yes    Method Used:  Pressure and Silver Nitrate  Patient tolerance:  Patient tolerated the procedure well with no immediate complications      Kylie King is a 62 y.o. female presenting w/   1. Diabetic ulcer of right heel associated with type 2 diabetes mellitus, with fat layer exposed    2. Diabetic peripheral neuropathy associated with type 2 diabetes mellitus    3. CKD (chronic kidney disease) stage 3, GFR 30-59 ml/min    4. Morbid obesity with BMI of 45.0-49.9, adult    5. Status post amputation of lesser toe, unspecified laterality      ADA Risk Classification: Hx of ulcer or amputation - 3: rtc 1-2 months    -pt seen, evaluated, and managed  -dx discussed in detail. All questions/concerns addressed  -pt " informed of dx and risk of limb loss  -px as above  -labs and XRs reviewed: no OM in area of open ulcer. Also obs [place] fxs - pt is nwb in football dressings  -NWB  -ulceration is stable and would benefit from PT woundcare eval and treat - referral order placed in epic    Dressings: hydrofera blue+football  Shoe: darco    Short-term goals include but are not limited to: maintaining good offloading and minimizing bioburden, promoting granulation and epithelialization to healing.  Wound healing is a medically reasonable expectation based on the clinical circumstances documented.    Long-term goals include but are not limited to: keeping the wound healed by good offloading and medical management under the direction of internist.    Advised pt of risks of current condition including but not limited to: worsening of infection, potential for limb loss    Follow-up:Patient is to return to the clinic in one week for follow-up but should call [facility] immediately if any signs of infection, such as fever, chills, sweats, increased redness or pain.      Follow-up in about 1 week (around 3/19/2018).

## 2018-03-12 NOTE — PATIENT INSTRUCTIONS
Ulcers    Ulcers, which are open sores in the skin, occur when the outer layers of the skin are injured and the deeper tissues become exposed. They can be caused by excess pressure due to ill-fitting shoes, long periods in bed or after an injury that breaks the skin. Ulcers are commonly seen in patients living with diabetes, neuropathy or vascular disease. Open wounds can put patients at increased risk of developing infection in the skin and bone.    The signs and symptoms of ulcers may include drainage, odor or red, inflamed, thickened tissue. Pain may or may not be present.    Diagnosis may include x-rays to evaluate possible bone involvement. Other advanced imaging studies may also be ordered to evaluate for vascular disease, which may affect a patients ability to heal the wound.    Ulcers are treated by removing the unhealthy tissue and performing local wound care to assist in healing. Special shoes or padding may be used to remove excess pressure on the area. If infection is present, antibiotics will be necessary. In severe cases that involve extensive infection or are slow to heal, surgery or other advanced wound care treatments may be necessary.          Diabetes: Treating Severe Foot Infections    Uncontrolled diabetes and diabetes complications make it harder for the body to heal. Even minor foot problems can develop into serious infections. If not treated, infections can lead to amputation. In some cases, they can even become life-threatening. Prompt treatment by your healthcare provider is needed to protect your foot and restore your health.  Get treatment  In some cases, infections can spread through the feet and up the leg. To treat a severe infection, you may be hospitalized and given intravenous (IV) antibiotics. You may also be referred to healthcare providers who specialize in treating infections. If the infection is a serious risk to your health, surgery may be recommended.  The goals of  surgery  The goal of surgery is to remove the infection and protect your foot or leg. Some surgeries remove a small amount of dead tissue from the infected area. In some cases, toes or larger amounts of tissue may be removed. Surgery may be done in a hospital or wound care facility. The length of your stay depends on the surgery and how well youre healing. During recovery, you will likely need to limit activity for a while. You may also have visits from a home healthcare nurse. Be sure to see your healthcare provider for follow-up appointments.  Wound care  Suggestions include the following:  · Regular wound care after surgery helps keep your foot free of infection and aids healing.  · Change your dressing every 6 hours, or as directed by your healthcare provider.  · You may need IV (intravenous) antibiotics to help control the infection. Other medicines may be used to help your foot heal more quickly.  · Do what you can to keep your blood sugar within your target range. This will also help wound healing.   · A home care nurse may shorten your hospital stay by helping with your dressings or IV antibiotics at home.  · If needed, your healthcare provider may refer you to a wound care facility. These are medical facilities that specialize in treating ulcers and infections that are hard to heal. While youre there, you may work with several kinds of healthcare providers. You may also be given antibiotics or other medicines that help fight infection. Part of your treatment also includes learning to care for the wound at home.  · You may be told to keep your foot elevated as much as possible. You may also be told to avoid putting weight on the foot.  Date Last Reviewed: 6/1/2016  © 1001-5404 [employer] [address] [place] [address], [place] [address]. All rights reserved. This information is not intended as a substitute for professional medical care. Always follow your healthcare professional's  instructions.

## 2018-03-14 ENCOUNTER — OFFICE VISIT (OUTPATIENT)
Dept: INTERNAL MEDICINE | Facility: CLINIC | Age: 63
End: 2018-03-14
Payer: COMMERCIAL

## 2018-03-14 ENCOUNTER — HOSPITAL ENCOUNTER (OUTPATIENT)
Dept: RADIOLOGY | Facility: HOSPITAL | Age: 63
Discharge: HOME | End: 2018-03-14
Attending: INTERNAL MEDICINE
Payer: COMMERCIAL

## 2018-03-14 VITALS
DIASTOLIC BLOOD PRESSURE: 70 MMHG | TEMPERATURE: 99 F | HEART RATE: 78 BPM | RESPIRATION RATE: 20 BRPM | WEIGHT: 293 LBS | SYSTOLIC BLOOD PRESSURE: 134 MMHG | BODY MASS INDEX: 50.02 KG/M2 | HEIGHT: 64 IN

## 2018-03-14 DIAGNOSIS — N18.30 CKD (CHRONIC KIDNEY DISEASE) STAGE 3, GFR 30-59 ML/MIN: ICD-10-CM

## 2018-03-14 DIAGNOSIS — M25.531 RIGHT WRIST PAIN: ICD-10-CM

## 2018-03-14 DIAGNOSIS — Z11.59 NEED FOR HEPATITIS C SCREENING TEST: ICD-10-CM

## 2018-03-14 DIAGNOSIS — M79.641 RIGHT HAND PAIN: ICD-10-CM

## 2018-03-14 DIAGNOSIS — Q82.9 SKIN ANOMALY: ICD-10-CM

## 2018-03-14 DIAGNOSIS — I10 ESSENTIAL HYPERTENSION: Primary | ICD-10-CM

## 2018-03-14 DIAGNOSIS — M48.062 SPINAL STENOSIS OF LUMBAR REGION WITH NEUROGENIC CLAUDICATION: ICD-10-CM

## 2018-03-14 DIAGNOSIS — R19.8 INCREASED ABDOMINAL GIRTH: ICD-10-CM

## 2018-03-14 PROCEDURE — 99214 OFFICE O/P EST MOD 30 MIN: CPT | Mod: S$GLB,,, | Performed by: INTERNAL MEDICINE

## 2018-03-14 PROCEDURE — 73110 X-RAY EXAM OF WRIST: CPT | Mod: 26,RT,, | Performed by: RADIOLOGY

## 2018-03-14 PROCEDURE — 73130 X-RAY EXAM OF HAND: CPT | Mod: 26,RT,, | Performed by: RADIOLOGY

## 2018-03-14 PROCEDURE — 73110 X-RAY EXAM OF WRIST: CPT | Mod: TC,PO,RT

## 2018-03-14 PROCEDURE — 99999 PR PBB SHADOW E&M-EST. PATIENT-LVL V: CPT | Mod: PBBFAC,,, | Performed by: INTERNAL MEDICINE

## 2018-03-14 PROCEDURE — 73130 X-RAY EXAM OF HAND: CPT | Mod: TC,PO,RT

## 2018-03-14 RX ORDER — ACETAMINOPHEN AND CODEINE PHOSPHATE 300; 30 MG/1; MG/1
1 TABLET ORAL EVERY 6 HOURS PRN
Qty: 90 TABLET | Refills: 2 | Status: ON HOLD | OUTPATIENT
Start: 2018-03-14 | End: 2018-04-24 | Stop reason: HOSPADM

## 2018-03-14 RX ORDER — ALBUTEROL SULFATE 90 UG/1
2 AEROSOL, METERED RESPIRATORY (INHALATION) EVERY 6 HOURS PRN
Qty: 18 G | Refills: 0 | Status: SHIPPED | OUTPATIENT
Start: 2018-03-14 | End: 2020-02-18 | Stop reason: SDUPTHER

## 2018-03-14 NOTE — PROGRESS NOTES
CC: followup of hypertension and diabetes  HPI:  The patient is a 62 y.o. year old female who presents to the office for followup of hypertension and diabetes.  The patient denies any chest pain, blurred vision, excessive fatigue, nausea or vomiting, but complains of occasional shortness of breath.  She also reports sinus headaches.  Review of labs reveals improved hemoglobin A1c and elevated creatinine.  She reports falling twice in the last 6 weeks.  She fell getting off the toilet.  She states her knee gave out, and fell on the right side of her face.  She had a blister on her foot, which ruptured.  She now has an ulcer on her right heel and fractured two toes.  She also experienced a fall about a week ago when she tripped on a rug at the gas station and braced the fall with her right hand.  She reports right wrist and hand pain and swelling have improved somewhat, but still persists.  The patient is right hand dominant.  She complains of feeling depressed.  She states she feels as if she has no control over her life.  The patient states she has been in a lot of pain lately.  She has not been taking her pain medication because she does not want to become dependent.  She reports increasing abdominal girth.  She has gained 22 pounds since her last visit.  She also complains of swelling and dry skin involving her lower extremities.    PAST MEDICAL HISTORY:  Past Medical History:   Diagnosis Date    Anxiety     Breast cancer 1/2013    left breast- invasive mammary carcinoma, ER/OR positive, Her2 negative    Choledocholithiasis     s/p ERCP and stent placement    Colon cancer 2001    CRI (chronic renal insufficiency)     one kidney    GERD (gastroesophageal reflux disease)     History of acute pancreatitis 2009 2/09 s/p sphincterotomy    History of colon cancer     s/p sigmoid colectomy    HTN (hypertension), benign     Hyperlipidemia     Intracerebral hemorrhage     Kidney stone     left    Obesity      Pancreatitis     Pancreatitis 2008    Stroke 12/2012    Tobacco abuse     Type 2 diabetes mellitus with neurological manifestations, controlled        SURGICAL HISTORY:  Past Surgical History:   Procedure Laterality Date    BREAST BIOPSY  1/2012    left breast invasive mammary carcinoma (lateral bx) and intraductal papilloma (medial bx)    BREAST BIOPSY  1999    rt breast FA    CHOLECYSTECTOMY  2001    COLON SURGERY      HERNIA REPAIR      left nephrectomy      atrophic kidney and hydronephrosis    left oopherectomy  2001    MASTECTOMY  2013    left    NEPHRECTOMY  2011    lap    SIGMOIDECTOMY  2001    TOTAL REDUCTION MAMMOPLASTY Right 2013       MEDS:  Medcard reviewed and updated    ALLERGIES: Allergy Card reviewed and updated    SOCIAL HISTORY:   The patient is a nonsmoker.    PE:   APPEARANCE: Well nourished, well developed, in no acute distress.    CHEST: Lungs clear to auscultation with unlabored respirations.  CARDIOVASCULAR: Normal S1, S2. No murmurs. No carotid bruits. No pedal edema.  ABDOMEN: Bowel sounds normal. Not distended. Soft. No tenderness or masses. No organomegaly.  MUSCULOSKELETAL:  Abnormal gait, ambulates with a walker.   PSYCHIATRIC: The patient is oriented to person, place, and time and has a pleasant affect.        ASSESSMENT/PLAN:  Kylie was seen today for follow-up, fall and tingling.    Diagnoses and all orders for this visit:    Essential hypertension  -     Blood pressure is controlled    Uncontrolled type 2 diabetes mellitus with stage 4 chronic kidney disease, with long-term current use of insulin  -     Diabetes control has improved    Spinal stenosis of lumbar region with neurogenic claudication  -     Continue pain control    CKD (chronic kidney disease) stage 3, GFR 30-59 ml/min  -     Schedule follow-up with nephrology    Increased abdominal girth  -     US Abdomen Complete; Future    Need for hepatitis C screening test  -     Hepatitis C antibody;  Future    Skin anomaly  -     Ambulatory Referral to Dermatology    Right hand pain  -     X-Ray Hand Complete Right; Future    Right wrist pain  -     X-Ray Wrist Complete 3 views Right; Future    Other orders  -     albuterol 90 mcg/actuation inhaler; Inhale 2 puffs into the lungs every 6 (six) hours as needed for Wheezing. Rescue  -     acetaminophen-codeine 300-30mg (TYLENOL-CODEINE #3) 300-30 mg Tab; Take 1 tablet by mouth every 6 (six) hours as needed.

## 2018-03-20 ENCOUNTER — OFFICE VISIT (OUTPATIENT)
Dept: PODIATRY | Facility: CLINIC | Age: 63
End: 2018-03-20
Payer: COMMERCIAL

## 2018-03-20 VITALS
SYSTOLIC BLOOD PRESSURE: 187 MMHG | BODY MASS INDEX: 50.02 KG/M2 | HEIGHT: 64 IN | DIASTOLIC BLOOD PRESSURE: 83 MMHG | HEART RATE: 79 BPM | RESPIRATION RATE: 18 BRPM | WEIGHT: 293 LBS

## 2018-03-20 DIAGNOSIS — L97.412 DIABETIC ULCER OF RIGHT HEEL ASSOCIATED WITH TYPE 2 DIABETES MELLITUS, WITH FAT LAYER EXPOSED: Primary | ICD-10-CM

## 2018-03-20 DIAGNOSIS — E11.621 DIABETIC ULCER OF RIGHT HEEL ASSOCIATED WITH TYPE 2 DIABETES MELLITUS, WITH FAT LAYER EXPOSED: Primary | ICD-10-CM

## 2018-03-20 DIAGNOSIS — Z89.429 STATUS POST AMPUTATION OF LESSER TOE, UNSPECIFIED LATERALITY: ICD-10-CM

## 2018-03-20 DIAGNOSIS — N18.30 CKD (CHRONIC KIDNEY DISEASE) STAGE 3, GFR 30-59 ML/MIN: ICD-10-CM

## 2018-03-20 DIAGNOSIS — I89.0 LYMPHEDEMA OF BOTH LOWER EXTREMITIES: ICD-10-CM

## 2018-03-20 DIAGNOSIS — I87.2 CHRONIC VENOUS INSUFFICIENCY: ICD-10-CM

## 2018-03-20 DIAGNOSIS — E66.01 MORBID OBESITY WITH BMI OF 45.0-49.9, ADULT: ICD-10-CM

## 2018-03-20 PROBLEM — S91.301A OPEN WOUND OF RIGHT HEEL: Status: ACTIVE | Noted: 2018-03-20

## 2018-03-20 PROCEDURE — 11042 DBRDMT SUBQ TIS 1ST 20SQCM/<: CPT | Mod: S$GLB,,, | Performed by: PODIATRIST

## 2018-03-20 PROCEDURE — 99213 OFFICE O/P EST LOW 20 MIN: CPT | Mod: 25,S$GLB,, | Performed by: PODIATRIST

## 2018-03-20 NOTE — PROGRESS NOTES
Subjective:    Patient ID: Kylie King is a 62 y.o. female.    Chief Complaint: Follow-up (foot ulcer, right foot)      HPI:   Pt presents for f/u foot ulcer  She is s/p L 3rd toe distal phalanx amputation for osteomyelitis last November which has healed  Pt has no other complaints    This patient has documented high risk feet requiring routine maintenance secondary to diabetes mellitis and those secondary complications of diabetes, as mentioned.    PCP: Virginia Foote MD    Date Last Seen by PCP: inepic  Current shoe gear:  Affected Foot: Extra depth shoes with custome accommodative insoles       Hemoglobin A1C   Date Value Ref Range Status   01/29/2018 8.1 (H) 4.0 - 5.6 % Final     Comment:     According to ADA guidelines, hemoglobin A1c <7.0% represents  optimal control in non-pregnant diabetic patients. Different  metrics may apply to specific patient populations.   Standards of Medical Care in Diabetes-2016.  For the purpose of screening for the presence of diabetes:  <5.7%     Consistent with the absence of diabetes  5.7-6.4%  Consistent with increasing risk for diabetes   (prediabetes)  >or=6.5%  Consistent with diabetes  Currently, no consensus exists for use of hemoglobin A1c  for diagnosis of diabetes for children.  This Hemoglobin A1c assay has significant interference with fetal   hemoglobin   (HbF). The results are invalid for patients with abnormal amounts of   HbF,   including those with known Hereditary Persistence   of Fetal Hemoglobin. Heterozygous hemoglobin variants (HbAS, HbAC,   HbAD, HbAE, HbA2) do not significantly interfere with this assay;   however, presence of multiple variants in a sample may impact the %   interference.     03/17/2017 10.4 (H) 4.5 - 6.2 % Final     Comment:     According to ADA guidelines, hemoglobin A1C <7.0% represents  optimal control in non-pregnant diabetic patients.  Different  metrics may apply to specific populations.   Standards of Medical Care in  Diabetes - 2016.  For the purpose of screening for the presence of diabetes:  <5.7%     Consistent with the absence of diabetes  5.7-6.4%  Consistent with increasing risk for diabetes   (prediabetes)  >or=6.5%  Consistent with diabetes  Currently no consensus exists for use of hemoglobin A1C  for diagnosis of diabetes for children.     05/17/2016 11.4 (H) 4.5 - 6.2 % Final         Past Medical History:   Diagnosis Date    Anxiety     Breast cancer 1/2013    left breast- invasive mammary carcinoma, ER/NH positive, Her2 negative    Choledocholithiasis     s/p ERCP and stent placement    Colon cancer 2001    CRI (chronic renal insufficiency)     one kidney    GERD (gastroesophageal reflux disease)     History of acute pancreatitis 2009 2/09 s/p sphincterotomy    History of colon cancer     s/p sigmoid colectomy    HTN (hypertension), benign     Hyperlipidemia     Intracerebral hemorrhage     Kidney stone     left    Obesity     Pancreatitis     Pancreatitis 2008    Stroke 12/2012    Tobacco abuse     Type 2 diabetes mellitus with neurological manifestations, controlled      Past Surgical History:   Procedure Laterality Date    BREAST BIOPSY  1/2012    left breast invasive mammary carcinoma (lateral bx) and intraductal papilloma (medial bx)    BREAST BIOPSY  1999    rt breast FA    CHOLECYSTECTOMY  2001    COLON SURGERY      HERNIA REPAIR      left nephrectomy      atrophic kidney and hydronephrosis    left oopherectomy  2001    MASTECTOMY  2013    left    NEPHRECTOMY  2011    lap    SIGMOIDECTOMY  2001    TOTAL REDUCTION MAMMOPLASTY Right 2013     Social History     Social History    Marital status: Single     Spouse name: N/A    Number of children: 2    Years of education: N/A     Occupational History    not working [employer]     Social History Main Topics    Smoking status: Current Every Day Smoker     Packs/day: 0.50     Types: Cigarettes    Smokeless tobacco: Never Used     "  Comment: anxious    Alcohol use No    Drug use: No    Sexual activity: No     Other Topics Concern    Not on file     Social History Narrative    She is not exercising regularly         Current Outpatient Prescriptions:     acetaminophen-codeine 300-30mg (TYLENOL-CODEINE #3) 300-30 mg Tab, Take 1 tablet by mouth every 6 (six) hours as needed., Disp: 90 tablet, Rfl: 2    albuterol 90 mcg/actuation inhaler, Inhale 2 puffs into the lungs every 6 (six) hours as needed for Wheezing. Rescue, Disp: 18 g, Rfl: 0    amLODIPine (NORVASC) 10 MG tablet, Take 1 tablet (10 mg total) by mouth every evening., Disp: 90 tablet, Rfl: 3    BD INSULIN PEN NEEDLE UF MINI 31 gauge x 3/16" Ndle, USE 5 NEEDLES PER DAY, Disp: 450 each, Rfl: 2    blood sugar diagnostic (ONETOUCH ULTRA TEST) Strp, 1 strip by Misc.(Non-Drug; Combo Route) route 4 (four) times daily before meals and nightly., Disp: 400 strip, Rfl: 4    carvedilol (COREG) 25 MG tablet, Take 1 tablet (25 mg total) by mouth 2 (two) times daily., Disp: 180 tablet, Rfl: 4    citalopram (CELEXA) 20 MG tablet, Take 1 tablet (20 mg total) by mouth nightly., Disp: 90 tablet, Rfl: 3    furosemide (LASIX) 40 MG tablet, Take 1 tablet (40 mg total) by mouth once daily., Disp: 90 tablet, Rfl: 4    gabapentin (NEURONTIN) 400 MG capsule, Take 1 capsule (400 mg total) by mouth 2 (two) times daily., Disp: 180 capsule, Rfl: 3    hydrALAZINE (APRESOLINE) 50 MG tablet, Take 1 tablet (50 mg total) by mouth every 8 (eight) hours., Disp: 270 tablet, Rfl: 3    insulin detemir (LEVEMIR FLEXTOUCH) 100 unit/mL (3 mL) SubQ InPn pen, INJECT 40 UNITS UNDER THE SKIN TWICE A DAY, Disp: 75 mL, Rfl: 3    insulin lispro (HUMALOG KWIKPEN) 100 unit/mL InPn pen, BLOOD GLUCOSE 150-200, INJECT 15 UNITS UNDER THE SKIN, 201-250, 18 UNITS, 251-300, 20 UNITS THREE TIMES A DAY AS DIRECTED, Disp: 45 mL, Rfl: 3    insulin needles, disposable, (PEN NEEDLE, DIABETIC) 31 Ndle, Patient needs 5 needles a day, " "Disp: 500 each, Rfl: 3    insulin needles, disposable, 32 x 5/32 " Ndle, 1 Device by Misc.(Non-Drug; Combo Route) route 5 (five) times daily., Disp: 400 each, Rfl: 6    lancets (ONETOUCH DELICA LANCETS) 33 gauge Misc, 1 lancet by Misc.(Non-Drug; Combo Route) route 4 (four) times daily before meals and nightly., Disp: 400 each, Rfl: 4    letrozole (FEMARA) 2.5 mg Tab, Take 1 tablet (2.5 mg total) by mouth every evening., Disp: 90 tablet, Rfl: 3    lisinopril (PRINIVIL,ZESTRIL) 40 MG tablet, Take 1 tablet (40 mg total) by mouth once daily., Disp: 90 tablet, Rfl: 3    mupirocin (BACTROBAN) 2 % ointment, Apply topically 3 (three) times daily., Disp: 30 g, Rfl: 1    pravastatin (PRAVACHOL) 40 MG tablet, Take 1 tablet (40 mg total) by mouth every evening., Disp: 90 tablet, Rfl: 3    ranitidine (ZANTAC) 150 MG capsule, Take 1 capsule (150 mg total) by mouth nightly., Disp: 90 capsule, Rfl: 3    oxybutynin (DITROPAN XL) 15 MG TR24, Take 1 tablet (15 mg total) by mouth once daily., Disp: 90 tablet, Rfl: 4    Current Facility-Administered Medications:     cefTRIAXone injection 2 g, 2 g, Intramuscular, Once, Spencer Roldan MD    lidocaine HCL 10 mg/ml (1%) injection 1 mL, 1 mL, Intramuscular, Once, Spencer Roldan MD  Review of patient's allergies indicates:   Allergen Reactions    Cyclobenzaprine Other (See Comments)     Patient stated causes hallucinations.     Erythromycin      Other reaction(s): Stomach upset    Keflex [cephalexin]      Yeast infection       BP (!) 187/83 (BP Location: Right arm, Patient Position: Sitting, BP Method: Large (Automatic))   Pulse 79   Resp 18   Ht 5' 4" (1.626 m)   Wt 136.1 kg (300 lb) Comment: verbal from patient, unalbe to stand without walker  BMI 51.49 kg/m²     ROS    Vascular: positive for edema  Musculoskeletal: positive for joint pain, joint edema  Skin: positive for lesions  Neurological: positive for burning, tingling, numbness  Gastrointestinal: negative " for stomach pain, nausea and vomiting    ROS Constitutional:  General Appearance: well nourished        Objective:        Ortho/SPM Exam  Physical Exam    V: DP 1/4, PT 1/4, CRT< 3s to all digits tested.     N: Sensation diminished to all distributions    Ortho:  +Motor EHL/FHL/TA/GA   L 3rd toe amp at dipj noted -  Well healed   Compartments soft/compressible. No pain on passive stretch of big toe. No calf  pain.    Derm: + heel ulceration. Chronic venous stasis changes with lymphedema b/l LE      Ulcer Location: R heel  Measurements: 2x1.5x.5cm  Periwound: intact  Drainage: intact  Malodor: none  Base:  fibrotic  Signs of infection: none        Assessment:     Imaging:     X-ray Foot Complete Right    Result Date: 2/27/2018  Time of Procedure: 02/27/18 13:31:47 Accession # [id number] Comparison: None. Number of views: 3: AP, oblique and lateral views of the RIGHT foot through a boot or sleeve. Findings: There is a mildly comminuted oblique fracture of the distal diaphysis of the fourth metatarsal with 0.2 cm of medial displacement of the distal fragment. There is also a comminuted fracture of the distal diaphysis of the fifth metacarpal with 0.6 cm a dorsal medial displacement of the distal fragment of the fifth metatarsal as seen on oblique view. Bulky inferior and posterior calcaneal spurs noted. There is diffuse osteopenia.  I detect no lytic or blastic lesion and no radiopaque retained foreign body.    Please see above. Electronically signed by: Jesusita Terry MD Date:     02/27/18 Time:    13:53       Recent Labs  Lab Result Units 02/27/18  1340   Sed Rate mm/Hr 61*   CRP mg/L 6.6   WBC K/uL 6.57   Procalcitonin ng/mL 0.04           1. Diabetic ulcer of right heel associated with type 2 diabetes mellitus, with fat layer exposed    2. DM type 2, uncontrolled, with neuropathy    3. Uncontrolled type 2 diabetes mellitus with diabetic nephropathy, with long-term current use of insulin    4. Morbid obesity with BMI  "of 45.0-49.9, adult    5. CKD (chronic kidney disease) stage 3, GFR 30-59 ml/min    6. Status post amputation of lesser toe, unspecified laterality    7. Chronic venous insufficiency    8. Lymphedema of both lower extremities          Plan:       Orders Placed This Encounter    Debridement     .   Kylie was seen today for follow-up.    Diagnoses and all orders for this visit:    Diabetic ulcer of right heel associated with type 2 diabetes mellitus, with fat layer exposed  -     Debridement    DM type 2, uncontrolled, with neuropathy    Uncontrolled type 2 diabetes mellitus with diabetic nephropathy, with long-term current use of insulin    Morbid obesity with BMI of 45.0-49.9, adult    CKD (chronic kidney disease) stage 3, GFR 30-59 ml/min    Status post amputation of lesser toe, unspecified laterality    Chronic venous insufficiency    Lymphedema of both lower extremities    Wound Debridement  Date/Time: 3/20/2018 1:48 PM  Performed by: СВЕТЛАНА TURPIN JR.  Authorized by: СВЕТЛАНА TURPIN JR.     Time out: Immediately prior to procedure a "time out" was called to verify the correct patient, procedure, equipment, support staff and site/side marked as required.    Consent Done?:  Yes (Verbal)    Preparation: Patient was prepped and draped in usual sterile fashion    Local anesthesia used?: No      Wound Details:    Location:  Right foot    Location:  Right Heel    Type of Debridement:  Excisional       Length (cm):  2       Area (sq cm):  2       Width (cm):  1       Percent Debrided (%):  100       Depth (cm):  0.5       Total Area Debrided (sq cm):  2    Depth of debridement:  Subcutaneous tissue    Tissue debrided:  Epidermis and Dermis    Devitalized tissue debrided:  Biofilm, Callus, Exudate and Fibrin    Instruments:  Blade    Bleeding:  Moderate  Hemostasis Achieved: Yes    Method Used:  Pressure and Silver Nitrate  Patient tolerance:  Patient tolerated the procedure well with no immediate " complications      Kylie King is a 62 y.o. female presenting w/   1. Diabetic ulcer of right heel associated with type 2 diabetes mellitus, with fat layer exposed    2. DM type 2, uncontrolled, with neuropathy    3. Uncontrolled type 2 diabetes mellitus with diabetic nephropathy, with long-term current use of insulin    4. Morbid obesity with BMI of 45.0-49.9, adult    5. CKD (chronic kidney disease) stage 3, GFR 30-59 ml/min    6. Status post amputation of lesser toe, unspecified laterality    7. Chronic venous insufficiency    8. Lymphedema of both lower extremities      ADA Risk Classification: Hx of ulcer or amputation - 3: rtc 1-2 months    -pt seen, evaluated, and managed  -dx discussed in detail. All questions/concerns addressed  -pt informed of dx and risk of limb loss  -px as above  -labs and XRs reviewed: no OM in area of open ulcer. Also obs MT fxs - pt is nwb in football dressings  -NWB RLE  -ulceration is stable and would benefit from PT woundcare eval and treat - pt has apt later today (so we will DC the football dressings today so that PT woundcare can eval her)  -she will cont to see PT woundcare weekly for woundcare  -she will f/u w/ us in 3-4 wks for a skin check and also for f/u care on her metatarsal fxs   -new XR at nxt visit    Dressings: mepilex border temporary dressing applied  Shoe: darco    Short-term goals include but are not limited to: maintaining good offloading and minimizing bioburden, promoting granulation and epithelialization to healing.  Wound healing is a medically reasonable expectation based on the clinical circumstances documented.    Long-term goals include but are not limited to: keeping the wound healed by good offloading and medical management under the direction of internist.    Advised pt of risks of current condition including but not limited to: worsening of infection, potential for limb loss    Follow-up:Patient is to return to the clinic in one week for  follow-up but should call [facility] immediately if any signs of infection, such as fever, chills, sweats, increased redness or pain.      Follow-up in about 4 weeks (around 4/17/2018).

## 2018-03-20 NOTE — PATIENT INSTRUCTIONS
Ulcers    Ulcers, which are open sores in the skin, occur when the outer layers of the skin are injured and the deeper tissues become exposed. They can be caused by excess pressure due to ill-fitting shoes, long periods in bed or after an injury that breaks the skin. Ulcers are commonly seen in patients living with diabetes, neuropathy or vascular disease. Open wounds can put patients at increased risk of developing infection in the skin and bone.    The signs and symptoms of ulcers may include drainage, odor or red, inflamed, thickened tissue. Pain may or may not be present.    Diagnosis may include x-rays to evaluate possible bone involvement. Other advanced imaging studies may also be ordered to evaluate for vascular disease, which may affect a patients ability to heal the wound.    Ulcers are treated by removing the unhealthy tissue and performing local wound care to assist in healing. Special shoes or padding may be used to remove excess pressure on the area. If infection is present, antibiotics will be necessary. In severe cases that involve extensive infection or are slow to heal, surgery or other advanced wound care treatments may be necessary.          Diabetes: Treating Severe Foot Infections    Uncontrolled diabetes and diabetes complications make it harder for the body to heal. Even minor foot problems can develop into serious infections. If not treated, infections can lead to amputation. In some cases, they can even become life-threatening. Prompt treatment by your healthcare provider is needed to protect your foot and restore your health.  Get treatment  In some cases, infections can spread through the feet and up the leg. To treat a severe infection, you may be hospitalized and given intravenous (IV) antibiotics. You may also be referred to healthcare providers who specialize in treating infections. If the infection is a serious risk to your health, surgery may be recommended.  The goals of  surgery  The goal of surgery is to remove the infection and protect your foot or leg. Some surgeries remove a small amount of dead tissue from the infected area. In some cases, toes or larger amounts of tissue may be removed. Surgery may be done in a hospital or wound care facility. The length of your stay depends on the surgery and how well youre healing. During recovery, you will likely need to limit activity for a while. You may also have visits from a home healthcare nurse. Be sure to see your healthcare provider for follow-up appointments.  Wound care  Suggestions include the following:  · Regular wound care after surgery helps keep your foot free of infection and aids healing.  · Change your dressing every 6 hours, or as directed by your healthcare provider.  · You may need IV (intravenous) antibiotics to help control the infection. Other medicines may be used to help your foot heal more quickly.  · Do what you can to keep your blood sugar within your target range. This will also help wound healing.   · A home care nurse may shorten your hospital stay by helping with your dressings or IV antibiotics at home.  · If needed, your healthcare provider may refer you to a wound care facility. These are medical facilities that specialize in treating ulcers and infections that are hard to heal. While youre there, you may work with several kinds of healthcare providers. You may also be given antibiotics or other medicines that help fight infection. Part of your treatment also includes learning to care for the wound at home.  · You may be told to keep your foot elevated as much as possible. You may also be told to avoid putting weight on the foot.  Date Last Reviewed: 6/1/2016  © 7717-3987 [employer], [employer]. [address] [place] [address], [place] [address]. All rights reserved. This information is not intended as a substitute for professional medical care. Always follow your healthcare professional's  instructions.

## 2018-03-26 ENCOUNTER — NURSE TRIAGE (OUTPATIENT)
Dept: ADMINISTRATIVE | Facility: CLINIC | Age: 63
End: 2018-03-26

## 2018-03-26 NOTE — TELEPHONE ENCOUNTER
Pt has been falling a lot recently. Yesterday very dizzy for about an hr after waking so sat on edge of bed - when she did get up she fell in doorway onto her left side where she has had surgery with implant post breast cancer. Since then not moving left arm due to pain, feeling sob as it is to painful to take deep breaths. Was calling to let pcp be aware of trauma.     Reason for Disposition   SEVERE chest pain    Protocols used: ST CHEST INJURY-A-OH

## 2018-04-10 PROBLEM — L97.413: Status: ACTIVE | Noted: 2018-04-10

## 2018-04-13 ENCOUNTER — TELEPHONE (OUTPATIENT)
Dept: INTERNAL MEDICINE | Facility: CLINIC | Age: 63
End: 2018-04-13

## 2018-04-13 NOTE — TELEPHONE ENCOUNTER
----- Message from Berlin Sharpe sent at 4/13/2018 11:41 AM CDT -----  Contact: self/[phone]  Type: Test Results    What test was performed? Blood work,Ultrasound    Who ordered the test?     When and where were the test performed? 3/14/18    Comments: Pt states that she really needs to speak with the doctor as soon as possible because she needs her results back and she has some things going on with her. Please advise.      Thanks

## 2018-04-13 NOTE — TELEPHONE ENCOUNTER
The only blood test we performed was a hepatitis C, which was negative.  Abdominal ultrasound showed no acute abnormalities, essentially unchanged from previous study.  What type of symptoms is she experiencing?  I can order some additional labs for her.

## 2018-04-17 ENCOUNTER — TELEPHONE (OUTPATIENT)
Dept: INTERNAL MEDICINE | Facility: CLINIC | Age: 63
End: 2018-04-17

## 2018-04-17 ENCOUNTER — PATIENT MESSAGE (OUTPATIENT)
Dept: INTERNAL MEDICINE | Facility: CLINIC | Age: 63
End: 2018-04-17

## 2018-04-17 DIAGNOSIS — N18.30 CKD (CHRONIC KIDNEY DISEASE) STAGE 3, GFR 30-59 ML/MIN: ICD-10-CM

## 2018-04-17 DIAGNOSIS — R60.9 EDEMA, UNSPECIFIED TYPE: ICD-10-CM

## 2018-04-17 NOTE — TELEPHONE ENCOUNTER
Please inform patient that we need to reassess her kidney function before I can increase her Lasix dose.  If kidney function is worsening, increasing Lasix can further worsening.  Please schedule labs.  If patient is becoming more uncomfortable, specifically having difficulty breathing, she should go to the emergency department.  Also, please ask patient to schedule appointment with nephrology.

## 2018-04-17 NOTE — TELEPHONE ENCOUNTER
"Patient states she is swelling in her stomach and bilateral lower extremity the patient states her stomach is "huge" and she states it is getting bigger and she states she is having some back pains. Patient states her legs have been swollen for a couple of weeks and her ankles. Patient states there is no discoloration.  Patient states there are no other Sx and she wants to take 2 lasixs instead of 1. Please advise   "

## 2018-04-17 NOTE — TELEPHONE ENCOUNTER
----- Message from Penelope Steinberg sent at 4/17/2018 11:27 AM CDT -----  Contact: patient- [phone]  Patient would like to get test results.  Name of test (lab, mammo, etc.):  labs  Date of test:  3-14  Ordering provider:   Where was the test performed:  [facility]  Comments:

## 2018-04-19 ENCOUNTER — TELEPHONE (OUTPATIENT)
Dept: INTERNAL MEDICINE | Facility: CLINIC | Age: 63
End: 2018-04-19

## 2018-04-19 ENCOUNTER — OFFICE VISIT (OUTPATIENT)
Dept: PODIATRY | Facility: CLINIC | Age: 63
End: 2018-04-19
Payer: COMMERCIAL

## 2018-04-19 VITALS
HEIGHT: 64 IN | RESPIRATION RATE: 20 BRPM | OXYGEN SATURATION: 88 % | HEART RATE: 78 BPM | DIASTOLIC BLOOD PRESSURE: 99 MMHG | SYSTOLIC BLOOD PRESSURE: 227 MMHG

## 2018-04-19 DIAGNOSIS — Z53.21 PATIENT LEFT BEFORE EVALUATION BY PHYSICIAN: Primary | ICD-10-CM

## 2018-04-19 PROBLEM — I16.1 HYPERTENSIVE EMERGENCY: Status: ACTIVE | Noted: 2018-04-19

## 2018-04-19 PROBLEM — R79.89 ELEVATED TROPONIN: Status: ACTIVE | Noted: 2018-04-19

## 2018-04-19 PROBLEM — R60.1 ANASARCA: Status: ACTIVE | Noted: 2018-04-19

## 2018-04-19 PROBLEM — N17.9 AKI (ACUTE KIDNEY INJURY): Status: ACTIVE | Noted: 2018-04-19

## 2018-04-19 PROBLEM — J81.0 ACUTE PULMONARY EDEMA: Status: ACTIVE | Noted: 2018-04-19

## 2018-04-19 PROBLEM — R06.02 SHORTNESS OF BREATH: Status: ACTIVE | Noted: 2018-04-19

## 2018-04-19 PROCEDURE — 99499 UNLISTED E&M SERVICE: CPT | Mod: S$GLB,,, | Performed by: PODIATRIST

## 2018-04-19 NOTE — PROGRESS NOTES
Patient arrived in Podiatry clinic for office visit, patient began stating she was feeling nausea due to not eating all day,patient was brought back to room, patient real SOB and feeling weak pulse ox-88 and Bp 227/99. Dr Poe notified. Patient wheeled to ER to be evaluated for SOB and High BP.

## 2018-04-19 NOTE — TELEPHONE ENCOUNTER
Called to advise the patient to go to the ED as she is having SOB and a great deal of lower extremity swelling. A message was sent to patient via portal Called numbers on file for patient and left a message to call back..

## 2018-04-20 PROBLEM — N30.00 ACUTE CYSTITIS WITHOUT HEMATURIA: Status: RESOLVED | Noted: 2017-02-09 | Resolved: 2018-04-20

## 2018-04-22 PROBLEM — R09.02 HYPOXIA: Status: ACTIVE | Noted: 2018-04-19

## 2018-04-23 PROBLEM — J81.1 PULMONARY EDEMA: Status: ACTIVE | Noted: 2018-04-19

## 2018-04-23 PROBLEM — R06.02 SHORTNESS OF BREATH: Status: ACTIVE | Noted: 2018-04-23

## 2018-04-23 PROBLEM — I50.33 ACUTE ON CHRONIC DIASTOLIC CONGESTIVE HEART FAILURE: Status: ACTIVE | Noted: 2018-04-23

## 2018-04-23 PROBLEM — I50.9 ACUTE CONGESTIVE HEART FAILURE, UNSPECIFIED CONGESTIVE HEART FAILURE TYPE: Status: ACTIVE | Noted: 2018-04-23

## 2018-04-24 ENCOUNTER — PATIENT MESSAGE (OUTPATIENT)
Dept: INTERNAL MEDICINE | Facility: CLINIC | Age: 63
End: 2018-04-24

## 2018-04-26 NOTE — PROGRESS NOTES
Pt was in distress. Blood pressure was high and O2 saturation was 88. Pt was transported to ED by Penelope Garrett MA.

## 2018-05-03 ENCOUNTER — TELEPHONE (OUTPATIENT)
Dept: INTERNAL MEDICINE | Facility: CLINIC | Age: 63
End: 2018-05-03

## 2018-05-03 DIAGNOSIS — M48.062 SPINAL STENOSIS OF LUMBAR REGION WITH NEUROGENIC CLAUDICATION: Primary | ICD-10-CM

## 2018-05-03 DIAGNOSIS — Z01.818 PREOP EXAMINATION: Primary | ICD-10-CM

## 2018-05-03 NOTE — TELEPHONE ENCOUNTER
Order for standard wheelchair has been entered.  Please advise if patient would prefer a light weight wheelchair instead.  It appears the light weight wheelchair would be justified if she is unable to propel herself in a standard wheelchair.

## 2018-05-03 NOTE — TELEPHONE ENCOUNTER
----- Message from Danette Bennett sent at 5/2/2018  3:59 PM CDT -----  Contact: Dionne from [facility] [phone]  Pt needs orders put in for a wheel chair. 5'4 and 306 lbs.

## 2018-05-07 ENCOUNTER — TELEPHONE (OUTPATIENT)
Dept: INTERNAL MEDICINE | Facility: CLINIC | Age: 63
End: 2018-05-07

## 2018-05-07 ENCOUNTER — OFFICE VISIT (OUTPATIENT)
Dept: SURGERY | Facility: CLINIC | Age: 63
End: 2018-05-07
Payer: COMMERCIAL

## 2018-05-07 VITALS
TEMPERATURE: 99 F | SYSTOLIC BLOOD PRESSURE: 150 MMHG | DIASTOLIC BLOOD PRESSURE: 80 MMHG | HEIGHT: 64 IN | BODY MASS INDEX: 50.02 KG/M2 | WEIGHT: 293 LBS | HEART RATE: 65 BPM

## 2018-05-07 DIAGNOSIS — J81.1 CHRONIC PULMONARY EDEMA: ICD-10-CM

## 2018-05-07 DIAGNOSIS — I50.33 ACUTE ON CHRONIC DIASTOLIC CONGESTIVE HEART FAILURE: ICD-10-CM

## 2018-05-07 DIAGNOSIS — E66.01 OBESITY, MORBID, BMI 50 OR HIGHER: ICD-10-CM

## 2018-05-07 DIAGNOSIS — Z99.2 ESRD ON HEMODIALYSIS: ICD-10-CM

## 2018-05-07 DIAGNOSIS — I87.2 CHRONIC VENOUS INSUFFICIENCY: ICD-10-CM

## 2018-05-07 DIAGNOSIS — N18.6 ESRD ON HEMODIALYSIS: ICD-10-CM

## 2018-05-07 DIAGNOSIS — L97.413 CHRONIC ULCER OF RIGHT HEEL WITH NECROSIS OF MUSCLE: ICD-10-CM

## 2018-05-07 DIAGNOSIS — E11.42 DIABETIC PERIPHERAL NEUROPATHY ASSOCIATED WITH TYPE 2 DIABETES MELLITUS: ICD-10-CM

## 2018-05-07 DIAGNOSIS — Z45.2 ENCOUNTER FOR CENTRAL LINE CARE: Primary | ICD-10-CM

## 2018-05-07 PROCEDURE — 99214 OFFICE O/P EST MOD 30 MIN: CPT | Mod: S$GLB,,, | Performed by: SURGERY

## 2018-05-07 PROCEDURE — 99999 PR PBB SHADOW E&M-EST. PATIENT-LVL III: CPT | Mod: PBBFAC,,, | Performed by: SURGERY

## 2018-05-07 RX ORDER — ACETAMINOPHEN AND CODEINE PHOSPHATE 300; 30 MG/1; MG/1
TABLET ORAL
Qty: 90 TABLET | Refills: 2 | Status: ON HOLD | OUTPATIENT
Start: 2018-05-07 | End: 2018-08-15 | Stop reason: HOSPADM

## 2018-05-07 NOTE — PROGRESS NOTES
Subjective:       Patient ID: Kylie King is a 62 y.o. female.    Chief Complaint: Post-op Evaluation    HPI   Pt presents with her son for re-eval of permacath. No prob, dialyzing well. Will be seeing vascular surgery later this week. Vein mapping pending. No new c/o.  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 Feels well. Presents in wheelchair on O2.    Review of Systems    All systems were reviewed and are negative, except that mentioned in the HPI.    Objective:      Physical Exam   Constitutional: She is oriented to person, place, and time. She appears well-developed and well-nourished. No distress.   HENT:   Head: Normocephalic and atraumatic.   Eyes: EOM are normal. Pupils are equal, round, and reactive to light. No scleral icterus.   Neck: Normal range of motion. Neck supple. No JVD present.   RIJ insertion site c/d/i, steri's removed   Cardiovascular: Normal rate and regular rhythm.    Pulmonary/Chest: Effort normal and breath sounds normal. No respiratory distress.   RIJ catheter exit site, c/d/i   Abdominal: Soft. She exhibits no distension.   Musculoskeletal: Normal range of motion.   Neurological: She is alert and oriented to person, place, and time. No cranial nerve deficit.   Skin: Skin is warm and dry. She is not diaphoretic.   Psychiatric: She has a normal mood and affect.       Vein mapping pending  Assessment:       1. Encounter for central line care    2. Diabetic peripheral neuropathy associated with type 2 diabetes mellitus    3. 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 Chronic pulmonary edema    5. Acute on chronic diastolic congestive heart failure    6. Chronic venous insufficiency    7. Obesity, morbid, BMI 50 or higher    8. ESRD on hemodialysis        Plan:       Patient it is doing well.  HD via permacath  Vein mapping pending  Vascular consult pending  Can f/u with me or other surgeon for permacath  removal when appropriate.  All questions answered  F/u prn

## 2018-05-07 NOTE — TELEPHONE ENCOUNTER
----- Message from Gisel Black MA sent at 5/7/2018  4:43 PM CDT -----  Contact: Nurse Leon @ [facility] # [phone] [place] # [phone]   PLEASE ADVISE  ----- Message -----  From: Jennie Navarro  Sent: 5/7/2018   4:32 PM  To: Dary LOUIS Staff    He's calling in regards to the patient having cellulitis and he wanted to know if you would like to add any wound care orders for the cellulitis?

## 2018-05-07 NOTE — TELEPHONE ENCOUNTER
Called and spoke with Nurse Edward @ [facility]  Cell # [phone] he advised he would rinse with Saline and or paint betadine and or compression stockings. Advised E&T termed the call

## 2018-05-07 NOTE — TELEPHONE ENCOUNTER
"----- Message from Sonja Reina sent at 5/7/2018  1:20 PM CDT -----  Contact: self/[phone]  RX request - refill or new RX.  Is this a refill or new RX:  New Rx  RX name and strength: acetaminophen-codeine 300-30mg (TYLENOL #3) 300-30 mg Tab  Directions: Take 1 tablet by mouth every 4 to 6 hours as needed. - Oral  Is this a 30 day or 90 day RX:    Pharmacy name and phone # (DON'T enter "on file" or "in chart"): [facility] [address] [facility] ([place] [place] - [address] [phone] (Phone)  [phone] (Fax)  Comments:  Patient stated that she lost the rx and need a new one.         "

## 2018-05-11 ENCOUNTER — INITIAL CONSULT (OUTPATIENT)
Dept: VASCULAR SURGERY | Facility: CLINIC | Age: 63
End: 2018-05-11
Payer: COMMERCIAL

## 2018-05-11 ENCOUNTER — HOSPITAL ENCOUNTER (OUTPATIENT)
Dept: VASCULAR SURGERY | Facility: CLINIC | Age: 63
Discharge: HOME | End: 2018-05-11
Attending: SURGERY
Payer: COMMERCIAL

## 2018-05-11 VITALS
TEMPERATURE: 98 F | HEART RATE: 76 BPM | HEIGHT: 64 IN | WEIGHT: 293 LBS | BODY MASS INDEX: 50.02 KG/M2 | DIASTOLIC BLOOD PRESSURE: 77 MMHG | SYSTOLIC BLOOD PRESSURE: 179 MMHG

## 2018-05-11 DIAGNOSIS — Z99.2 ESRD ON DIALYSIS: ICD-10-CM

## 2018-05-11 DIAGNOSIS — Z99.2 ESRD ON DIALYSIS: Primary | ICD-10-CM

## 2018-05-11 DIAGNOSIS — Z01.818 PREOP EXAMINATION: ICD-10-CM

## 2018-05-11 DIAGNOSIS — N18.6 ESRD ON DIALYSIS: ICD-10-CM

## 2018-05-11 DIAGNOSIS — N18.6 ESRD ON DIALYSIS: Primary | ICD-10-CM

## 2018-05-11 PROCEDURE — 99204 OFFICE O/P NEW MOD 45 MIN: CPT | Mod: S$GLB,,, | Performed by: SURGERY

## 2018-05-11 PROCEDURE — 99999 PR PBB SHADOW E&M-EST. PATIENT-LVL III: CPT | Mod: PBBFAC,,, | Performed by: SURGERY

## 2018-05-11 PROCEDURE — G0365 VESSEL MAPPING HEMO ACCESS: HCPCS | Mod: S$GLB,,, | Performed by: SURGERY

## 2018-05-11 RX ORDER — MUPIROCIN 20 MG/G
OINTMENT TOPICAL
Status: CANCELLED | OUTPATIENT
Start: 2018-05-11

## 2018-05-11 RX ORDER — LIDOCAINE HYDROCHLORIDE 10 MG/ML
1 INJECTION, SOLUTION EPIDURAL; INFILTRATION; INTRACAUDAL; PERINEURAL ONCE
Status: CANCELLED | OUTPATIENT
Start: 2018-05-11 | End: 2018-05-11

## 2018-05-11 NOTE — PROGRESS NOTES
1HISTORY OF PRESENT ILLNESS:  A 62-year-old female with new end-stage renal   disease, dialyzing via right IJ PermCath for the last two to three weeks.  She   is here for consideration of permanent dialysis access.  She is right-handed.    She has no history of AICD or pacemaker placement.    It sounds as though her renal failure came on rather suddenly and was   hospitalized for some time.  She is still using supplemental oxygen, but she is   feeling better and can now lie flat.    PAST MEDICAL HISTORY:  1.  History of breast cancer.  2.  History of choledocholithiasis.  3.  Hypertension.  4.  Hyperlipidemia.  5.  History of intracerebral hemorrhage.  6.  History of pancreatitis.  7.  History of stroke.  8.  Type 2 diabetes.    PAST SURGICAL HISTORY:  1.  Sigmoidectomy.  2.  Cholecystectomy.  3.  Nephrectomy.  4.  Breast biopsies.    FAMILY HISTORY:  Positive for diabetes and heart disease.    SOCIAL HISTORY:  Current smoker.    MEDICATIONS:  No anticoagulants or antiplatelet agents.  See EPIC for full list.    ALLERGIES:  KEFLEX, ERYTHROMYCIN AND CYCLOBENZAPRINE.    REVIEW OF SYSTEMS:  Denies postprandial pain or DVT.  All other systems   including eyes, ENT, respiratory, musculoskeletal, skin, psychiatric, heme,   lymph, allergy and immune are negative.    PHYSICAL EXAMINATION:  VITAL SIGNS:  See nursing note.  GENERAL:  She appears somewhat chronically ill using supplemental oxygen.    Resume normal effort.  Clear to auscultation.  CARDIAC:  Regular rate and rhythm.  Nondisplaced PMI, no murmur.  VASCULAR:  2+ radial and brachial pulses.  EXTREMITIES:  Her arms are somewhat generous in girth.  Her veins are not   ballottable.    IMAGING:  Duplex suggests excellent right forearm and upper arm cephalic vein,   4.8 at the wrist, 4.6 at the midforearm, 4.5 at the antecubital fossa and 4.0 at   the upper arm.    ASSESSMENT:  Good AV fistula candidate with vein mapping.  Her body habitus may   ultimately necessitate  secondary transposition.  She does appear chronically   ill, but looks as though she would be able to tolerate her regional anesthetic.    PLAN:  1.  Right Liv AV fistula on 05/21/2018.  2.  Regional block.  3.  We will interrogate this vein immediately prior to beginning the surgery to   confirm adequacy.  I have told her that if the forearm vein does not appear   adequate, we would use the upper arm vein.    The patient understands the risks and rationale of the procedure and wishes to   proceed.      MELI  dd: 05/11/2018 12:34:40 (CDT)  td: 05/11/2018 23:38:48 (CDT)  Doc ID   #[id number]  Job ID #[id number]    CC:

## 2018-05-17 ENCOUNTER — TELEPHONE (OUTPATIENT)
Dept: VASCULAR SURGERY | Facility: CLINIC | Age: 63
End: 2018-05-17

## 2018-05-21 ENCOUNTER — TELEPHONE (OUTPATIENT)
Dept: INTERNAL MEDICINE | Facility: CLINIC | Age: 63
End: 2018-05-21

## 2018-05-21 DIAGNOSIS — M48.062 SPINAL STENOSIS OF LUMBAR REGION WITH NEUROGENIC CLAUDICATION: Primary | ICD-10-CM

## 2018-05-21 NOTE — TELEPHONE ENCOUNTER
----- Message from Catalina Montejo sent at 5/18/2018  3:31 PM CDT -----  Contact: Alin/ [phone]/ Dionne  Please send an order for a rollator walker to [facility]. You sent an order for a wheelchair which was very much appreciated but patient couldn't afford to pay the $40 monthly rental so they decided to go with a walker .

## 2018-05-25 ENCOUNTER — TELEPHONE (OUTPATIENT)
Dept: VASCULAR SURGERY | Facility: CLINIC | Age: 63
End: 2018-05-25

## 2018-05-25 NOTE — PRE-PROCEDURE INSTRUCTIONS
PreOp Instructions given:     - Verbal medication information (what to hold and what to take)   - NPO guidelines   - Arrival place directions given;   - Bathing with antibacterial soap   - Don't wear any jewelry or bring any valuables AM of surgery   - No makeup or moisturizer to face   - No perfume/cologne, powder, lotions or aftershave       Pt. verbalized understanding.     Denies any  history of side effects or issues with anesthesia or sedation.

## 2018-05-25 NOTE — TELEPHONE ENCOUNTER
Confirmed time of arrival for patient's scheduled surgery on Monday, May 28, 2018 with patient's  Henry King.

## 2018-05-28 ENCOUNTER — ANESTHESIA (OUTPATIENT)
Dept: SURGERY | Facility: HOSPITAL | Age: 63
End: 2018-05-28
Payer: COMMERCIAL

## 2018-05-28 ENCOUNTER — SURGERY (OUTPATIENT)
Age: 63
End: 2018-05-28

## 2018-05-28 ENCOUNTER — HOSPITAL ENCOUNTER (OUTPATIENT)
Facility: HOSPITAL | Age: 63
Discharge: HOME | End: 2018-05-28
Attending: SURGERY | Admitting: SURGERY
Payer: COMMERCIAL

## 2018-05-28 ENCOUNTER — ANESTHESIA EVENT (OUTPATIENT)
Dept: SURGERY | Facility: HOSPITAL | Age: 63
End: 2018-05-28
Payer: COMMERCIAL

## 2018-05-28 VITALS
OXYGEN SATURATION: 97 % | DIASTOLIC BLOOD PRESSURE: 64 MMHG | WEIGHT: 260 LBS | SYSTOLIC BLOOD PRESSURE: 150 MMHG | HEART RATE: 66 BPM | RESPIRATION RATE: 22 BRPM | TEMPERATURE: 98 F | HEIGHT: 64 IN | BODY MASS INDEX: 44.39 KG/M2

## 2018-05-28 DIAGNOSIS — Z99.2 ESRD ON DIALYSIS: ICD-10-CM

## 2018-05-28 DIAGNOSIS — N18.6 ESRD ON DIALYSIS: ICD-10-CM

## 2018-05-28 LAB
POCT GLUCOSE: 168 MG/DL (ref 70–110)
POCT GLUCOSE: 180 MG/DL (ref 70–110)

## 2018-05-28 PROCEDURE — 25000003 PHARM REV CODE 250: Performed by: STUDENT IN AN ORGANIZED HEALTH CARE EDUCATION/TRAINING PROGRAM

## 2018-05-28 PROCEDURE — 63600175 PHARM REV CODE 636 W HCPCS: Performed by: NURSE ANESTHETIST, CERTIFIED REGISTERED

## 2018-05-28 PROCEDURE — 25000003 PHARM REV CODE 250: Performed by: SURGERY

## 2018-05-28 PROCEDURE — 27200750 HC INSULATED NEEDLE/ STIMUPLEX: Performed by: ANESTHESIOLOGY

## 2018-05-28 PROCEDURE — 71000045 HC DOSC ROUTINE RECOVERY EA ADD'L HR: Performed by: SURGERY

## 2018-05-28 PROCEDURE — 63600175 PHARM REV CODE 636 W HCPCS: Performed by: SURGERY

## 2018-05-28 PROCEDURE — 82962 GLUCOSE BLOOD TEST: CPT | Performed by: SURGERY

## 2018-05-28 PROCEDURE — 36821 AV FUSION DIRECT ANY SITE: CPT | Mod: ,,, | Performed by: SURGERY

## 2018-05-28 PROCEDURE — 36000706: Performed by: SURGERY

## 2018-05-28 PROCEDURE — D9220A PRA ANESTHESIA: Mod: ANES,,, | Performed by: ANESTHESIOLOGY

## 2018-05-28 PROCEDURE — D9220A PRA ANESTHESIA: Mod: CRNA,,, | Performed by: NURSE ANESTHETIST, CERTIFIED REGISTERED

## 2018-05-28 PROCEDURE — 37000008 HC ANESTHESIA 1ST 15 MINUTES: Performed by: SURGERY

## 2018-05-28 PROCEDURE — 37000009 HC ANESTHESIA EA ADD 15 MINS: Performed by: SURGERY

## 2018-05-28 PROCEDURE — 71000015 HC POSTOP RECOV 1ST HR: Performed by: SURGERY

## 2018-05-28 PROCEDURE — 27000221 HC OXYGEN, UP TO 24 HOURS

## 2018-05-28 PROCEDURE — 36000707: Performed by: SURGERY

## 2018-05-28 PROCEDURE — 71000016 HC POSTOP RECOV ADDL HR: Performed by: SURGERY

## 2018-05-28 PROCEDURE — 71000044 HC DOSC ROUTINE RECOVERY FIRST HOUR: Performed by: SURGERY

## 2018-05-28 PROCEDURE — S0077 INJECTION, CLINDAMYCIN PHOSP: HCPCS | Performed by: STUDENT IN AN ORGANIZED HEALTH CARE EDUCATION/TRAINING PROGRAM

## 2018-05-28 PROCEDURE — 25000003 PHARM REV CODE 250: Performed by: ANESTHESIOLOGY

## 2018-05-28 RX ORDER — LIDOCAINE HCL/PF 100 MG/5ML
SYRINGE (ML) INTRAVENOUS
Status: DISCONTINUED | OUTPATIENT
Start: 2018-05-28 | End: 2018-05-28

## 2018-05-28 RX ORDER — FENTANYL CITRATE 50 UG/ML
INJECTION, SOLUTION INTRAMUSCULAR; INTRAVENOUS
Status: DISCONTINUED | OUTPATIENT
Start: 2018-05-28 | End: 2018-05-28

## 2018-05-28 RX ORDER — HYDROCODONE BITARTRATE AND ACETAMINOPHEN 5; 325 MG/1; MG/1
1 TABLET ORAL EVERY 4 HOURS PRN
Status: DISCONTINUED | OUTPATIENT
Start: 2018-05-28 | End: 2018-05-28 | Stop reason: HOSPADM

## 2018-05-28 RX ORDER — MIDAZOLAM HYDROCHLORIDE 1 MG/ML
INJECTION, SOLUTION INTRAMUSCULAR; INTRAVENOUS
Status: DISCONTINUED | OUTPATIENT
Start: 2018-05-28 | End: 2018-05-28

## 2018-05-28 RX ORDER — LIDOCAINE HYDROCHLORIDE 10 MG/ML
1 INJECTION, SOLUTION EPIDURAL; INFILTRATION; INTRACAUDAL; PERINEURAL ONCE
Status: DISCONTINUED | OUTPATIENT
Start: 2018-05-28 | End: 2018-05-28 | Stop reason: HOSPADM

## 2018-05-28 RX ORDER — SODIUM CHLORIDE 9 MG/ML
INJECTION, SOLUTION INTRAVENOUS CONTINUOUS
Status: DISCONTINUED | OUTPATIENT
Start: 2018-05-28 | End: 2018-05-28 | Stop reason: HOSPADM

## 2018-05-28 RX ORDER — SODIUM CHLORIDE 0.9 % (FLUSH) 0.9 %
3 SYRINGE (ML) INJECTION
Status: DISCONTINUED | OUTPATIENT
Start: 2018-05-28 | End: 2018-05-28 | Stop reason: HOSPADM

## 2018-05-28 RX ORDER — PROPOFOL 10 MG/ML
VIAL (ML) INTRAVENOUS CONTINUOUS PRN
Status: DISCONTINUED | OUTPATIENT
Start: 2018-05-28 | End: 2018-05-28

## 2018-05-28 RX ORDER — HYDROMORPHONE HYDROCHLORIDE 1 MG/ML
0.2 INJECTION, SOLUTION INTRAMUSCULAR; INTRAVENOUS; SUBCUTANEOUS EVERY 5 MIN PRN
Status: DISCONTINUED | OUTPATIENT
Start: 2018-05-28 | End: 2018-05-28 | Stop reason: HOSPADM

## 2018-05-28 RX ORDER — HEPARIN SODIUM 1000 [USP'U]/ML
INJECTION, SOLUTION INTRAVENOUS; SUBCUTANEOUS
Status: DISCONTINUED | OUTPATIENT
Start: 2018-05-28 | End: 2018-05-28

## 2018-05-28 RX ORDER — HEPARIN SODIUM 1000 [USP'U]/ML
INJECTION, SOLUTION INTRAVENOUS; SUBCUTANEOUS
Status: DISCONTINUED | OUTPATIENT
Start: 2018-05-28 | End: 2018-05-28 | Stop reason: HOSPADM

## 2018-05-28 RX ORDER — HYDROCODONE BITARTRATE AND ACETAMINOPHEN 5; 325 MG/1; MG/1
1 TABLET ORAL EVERY 6 HOURS PRN
Qty: 35 TABLET | Refills: 0 | Status: SHIPPED | OUTPATIENT
Start: 2018-05-28 | End: 2018-08-14

## 2018-05-28 RX ORDER — MUPIROCIN 20 MG/G
OINTMENT TOPICAL
Status: DISCONTINUED | OUTPATIENT
Start: 2018-05-28 | End: 2018-05-28 | Stop reason: HOSPADM

## 2018-05-28 RX ORDER — CLINDAMYCIN PHOSPHATE 900 MG/50ML
900 INJECTION, SOLUTION INTRAVENOUS
Status: DISCONTINUED | OUTPATIENT
Start: 2018-05-28 | End: 2018-05-28 | Stop reason: HOSPADM

## 2018-05-28 RX ADMIN — FENTANYL CITRATE 25 MCG: 50 INJECTION, SOLUTION INTRAMUSCULAR; INTRAVENOUS at 01:05

## 2018-05-28 RX ADMIN — PROPOFOL 50 MCG/KG/MIN: 10 INJECTION, EMULSION INTRAVENOUS at 01:05

## 2018-05-28 RX ADMIN — FENTANYL CITRATE 50 MCG: 50 INJECTION, SOLUTION INTRAMUSCULAR; INTRAVENOUS at 01:05

## 2018-05-28 RX ADMIN — FENTANYL CITRATE 25 MCG: 50 INJECTION, SOLUTION INTRAMUSCULAR; INTRAVENOUS at 02:05

## 2018-05-28 RX ADMIN — MIDAZOLAM HYDROCHLORIDE 1 MG: 1 INJECTION, SOLUTION INTRAMUSCULAR; INTRAVENOUS at 01:05

## 2018-05-28 RX ADMIN — HEPARIN SODIUM 3000 UNITS: 1000 INJECTION, SOLUTION INTRAVENOUS; SUBCUTANEOUS at 01:05

## 2018-05-28 RX ADMIN — LIDOCAINE HYDROCHLORIDE 100 MG: 20 INJECTION, SOLUTION INTRAVENOUS at 01:05

## 2018-05-28 RX ADMIN — MUPIROCIN: 20 OINTMENT TOPICAL at 12:05

## 2018-05-28 RX ADMIN — SODIUM CHLORIDE: 9 INJECTION, SOLUTION INTRAVENOUS at 12:05

## 2018-05-28 RX ADMIN — CLINDAMYCIN IN 5 PERCENT DEXTROSE 900 MG: 18 INJECTION, SOLUTION INTRAVENOUS at 01:05

## 2018-05-28 RX ADMIN — MIDAZOLAM HYDROCHLORIDE 1 MG: 1 INJECTION, SOLUTION INTRAMUSCULAR; INTRAVENOUS at 12:05

## 2018-05-28 RX ADMIN — HEPARIN SODIUM 10000 UNITS: 1000 INJECTION, SOLUTION INTRAVENOUS; SUBCUTANEOUS at 01:05

## 2018-05-28 NOTE — ANESTHESIA PROCEDURE NOTES
Supraclavicular Brachial Plexus Single Injection Block    Patient location during procedure: OR    Reason for block: primary anesthetic   Diagnosis: right arm pain   Start time: 5/28/2018 1:06 PM  Timeout: 5/28/2018 1:05 PM   End time: 5/28/2018 1:15 PM  Staffing  Anesthesiologist: CYNDI FARIAS  Resident/CRNA: DANGELO ANDRADE  Performed: resident/CRNA   Preanesthetic Checklist  Completed: patient identified, site marked, surgical consent, pre-op evaluation, timeout performed, IV checked, risks and benefits discussed and monitors and equipment checked  Peripheral Block  Patient position: supine  Prep: ChloraPrep  Patient monitoring: heart rate, cardiac monitor, continuous pulse ox, continuous capnometry and frequent blood pressure checks  Block type: supraclavicular  Laterality: right  Injection technique: single shot  Needle  Needle type: Stimuplex   Needle gauge: 22 G  Needle length: 2 in  Needle localization: anatomical landmarks and ultrasound guidance   -ultrasound image captured on disc.  Assessment  Injection assessment: negative aspiration, negative parasthesia and local visualized surrounding nerve  Paresthesia pain: none  Heart rate change: no  Slow fractionated injection: yes  Medications:  Bolus administered: 30 mL of 1.5 mepivacaine  Epinephrine added: 3.75 mcg/mL (1/300,000)  Additional Notes  Intercostal brachial field block performed with mepivacaine 1.5% 10ml for additional coverage.    VSS.  See anesthesia record.  Patient tolerated procedure well.

## 2018-05-28 NOTE — BRIEF OP NOTE
[facility]  Brief Operative Note     SUMMARY     Surgery Date: 5/28/2018     Surgeon(s) and Role:     * Vanda Fournier MD - Fellow     * RICK Pollard III, MD - Primary    Assisting Surgeon: None    Pre-op Diagnosis:  ESRD on dialysis [N18.6, Z99.2]    Post-op Diagnosis:  Post-Op Diagnosis Codes:     * ESRD on dialysis [N18.6, Z99.2]    Procedure(s) (LRB):  CREATION -FISTULA-AV (Right) l[place]    Anesthesia: Regional    Description of the findings of the procedure: excellent artery (3mm) and cephalic vein (4mm); good thrill    Findings/Key Components: as above    Estimated Blood Loss: 10cc         Specimens:   Specimen (12h ago through future)    None          Discharge Note    SUMMARY     Admit Date: 5/28/2018    Discharge Date and Time: 5/28/18    Hospital Course (synopsis of major diagnoses, care, treatment, and services provided during the course of the hospital stay): successful outpatient procedure    Final Diagnosis: Post-Op Diagnosis Codes:     * ESRD on dialysis [N18.6, Z99.2]    Disposition: home    Follow Up/Patient Instructions: Diet: renal  Act: ad nehemiah  FU: 6 week with QUANTITATIVE AVF duplex     Medications: pre-op + analgesic

## 2018-05-28 NOTE — PLAN OF CARE
Problem: Patient Care Overview  Goal: Plan of Care Review  Outcome: Outcome(s) achieved Date Met: 05/28/18  Awake and alert. VSS. Denies pain or nausea. Tolerating liquids well.  DC instructions given to patient and family and they verbalize understanding. Sling applied to R arm.

## 2018-05-28 NOTE — PROGRESS NOTES
Discussed IV placement with Dr. Fournier (vascular surgery) MD stated it was okay to place PIV to left hand for surgery.

## 2018-05-28 NOTE — DISCHARGE INSTRUCTIONS
Arteriovenous (AV) Fistula for Dialysis  An AV fistula is a connection between an artery and a vein. For this procedure, an AV fistula is surgically created using an artery and a vein in your arm. (Your healthcare provider will let you know if another site is to be used.) When the artery and vein are joined, blood flow increases from the artery into the vein. As a result, the vein gets bigger over time. The enlarged vein provides easier access to the blood for a treatment for kidney failure (dialysis). This sheet explains the procedure and what to expect.     An AV fistula increases blood flow from the artery into the vein. Over time, the vein becomes stronger and enlarged.   Preparing for the procedure  Prepare as you have been told. In addition:  · Tell your healthcare provider about all the medicines you take. This includes all over-the-counter and prescription medicines, and street drugs. It also includes herbs, vitamins, and other supplements. You may need to stop taking some or all of them before the procedure.  · Follow any directions youre given for not eating or drinking before the procedure.  · Do not allow anyone to draw blood from or take blood pressure on the arm that will have the fistula before the procedure.  The day of the procedure  The procedure takes about 1 to 2 hours. Youll likely go home the same day.  Before the procedure begins:  · An IV (intravenous) line is put into a vein in the arm or hand not being used for the procedure. This line supplies fluids and medicines.  · To keep you free of pain during the procedure, youre given general anesthesia. This medicine puts you into a state like a deep sleep through the procedure. Or a nerve block may be used. This medicine numbs the arm. With it, you may also be given medicine that makes you relaxed and drowsy through the procedure.  During the procedure:  · The skin over your arm may be injected with numbing medicine.  · One or more small  cuts (incisions) are then made through the numbed skin. This depends on the size of your arm and the depth of the vein in your arm.  · The vein is attached to the selected artery.  · Any incisions made are then closed with stitches (sutures), staples, surgical glue, or strips of surgical tape.  After the procedure:  · Youll be asked to keep your arm raised (elevated) as often as possible for at least a week after the procedure.  · Youll be given medicines to manage pain as needed.  · Your arm and hand will be checked to make sure blood is flowing through the fistula properly. The feeling of blood rushing through the fistula is called a thrill. It is somewhat similar to the purring of a cat. Youll be taught how to check for this feeling each day to make sure there are no problems with your fistula. Youll also be taught how to care for your fistula at home.  · When its time for you to leave the hospital, have an adult family member or friend ready to drive you home.  Recovering at home  Once at home, follow all of the instructions youve been given. Be sure to:  · Take all medicines as directed.  · Care for your incision as instructed.  · Check for signs of infection at the incision site (see below).  · Avoid heavy lifting and strenuous activities as directed.  · Monitor and care for your fistula as instructed.  · Do your hand and arm exercises as instructed. This usually involves squeezing a ball in your hand for a few minutes each hour.  Call your healthcare provider if you have any of the following:  · Fever of 100.4°F (38°C) or higher  · Signs of infection at the incision site, such as increased redness or swelling, warmth, worsening pain, bleeding, or bad-smelling drainage  · You cant feel a thrill (the vibration of blood going through your arm)  · Pain or numbness in your fingers, hand, or arm  · Bleeding, redness, or warmth around your fistula  · Sudden bulging of the fistula (more than usual; a slight  bulge is normal)   Follow-Up  Your healthcare provider will check your fistula within 1 to 2 weeks after the procedure. It will likely take about 6 to 8 weeks for the fistula to enlarge enough to start dialysis. After that, make sure the fistula is checked each time you have dialysis. Your healthcare provider may also suggest checkups every 6 months.  Risks and possible complications include:  · The fistula not working properly  · Long wait before the fistula is ready (up to 6 months)  · Coldness or numbness in the hand (due to blood flowing away from the hand and into the fistula)  · An unsightly bump under the skin (due to enlargement of the fistula)  · Prolonged bleeding from the fistula after dialysis  · Narrowing or weakening of the blood vessels used for the fistula  · Formation of blood clots in the blood vessels used for the fistula  · Risks of anesthesia or any other medicines used during the procedure   Living with an AV Fistula  A problem, such as a narrowing (stricture) of the vein or an infection, can make the fistula unusable. If this happens, you may need other treatments to repair or make a new fistula. To protect your fistula, follow these and any other guidelines youre given:  · Check your fistula as often as your healthcare provider says. If you cant feel your thrill, let your provider know right away.  · Make sure your fistula is checked before each dialysis treatment.  · Dont let anyone draw blood from or take blood pressure on the arm that has the fistula.  · Wash your hands often and keep the area around your fistula clean.  · Dont sleep on the arm that has the fistula.  · Dont wear tight jewelry or a watch on the arm with your fistula.  · Protect your fistula from cuts, scrapes, or blows.  Date Last Reviewed: 1/1/2017  © 6468-4117 [employer]. [address] [place] [address], [place] [address]. All rights reserved. This information is not intended as a substitute for professional  medical care. Always follow your healthcare professional's instructions.

## 2018-05-28 NOTE — OP NOTE
[facility]  Vascular Surgery  Operative Note    SUMMARY     Date of Procedure: 5/28/2018     Procedure: Right radiocephalic AVF (Liv)    Surgeon(s) and Role:     * Vanda Fournier MD - Fellow     * RICK Pollard III, MD - Primary    Assisting Surgeon: None    Pre-Operative Diagnosis: ESRD on dialysis [N18.6, Z99.2]    Post-Operative Diagnosis: Post-Op Diagnosis Codes:     * ESRD on dialysis [N18.6, Z99.2]    Anesthesia: Regional    Indication for operation:  63 yo F with morbid obesity, CKD stage V on HD via R IJ permacath in need of HD access.    Description of the Findings of the Procedure: excellent artery (3mm) and cephalic vein (4mm); good thrill    Complications: No    Estimated Blood Loss (EBL): 5 mL           Implants: * No implants in log *    Specimens:   Specimen (12h ago through future)    None         Condition: Good    Disposition: PACU - hemodynamically stable.    Operation in detail: After an informed consent was obtained the patient taken to the operating room and placed in the supine position. The patient received a regional nerve block by anesthesia prior to the procedure. The right upper extremity was prepped and draped in the usual sterile fashion. Ultrasound was used to evaluate the right upper extremity forearm cephalic vein and radial artery which were felt to be adequate for a radiocephalic AV fistula placement. The patient received the appropriate preoperative antibiotics. A longitudinal incision was made along the right wrist equidistant between the radial artery and cephalic vein at the wrist. The vein was dissected free from the surrounding tissue and small branch points were ligated with 3-0 silk ties. The anterior margin of the cephalic vein was marked to make correct orientation discernible using a sterile marker. The radial artery was identified and dissected free from the surrounding tissue and isolated with a vessel loop. Patient was given 3000 U of  IV heparin. The distal end of the cephalic vein was then ligated with silk suture and the proximal end was beveled for the upcoming anastomosis.  A longitudinal arteriotomy was made in radial artery and the radial artery and cephalic vein were flushed with heparinized saline. Next the vein and artery were anastomosed in a end-to-side fashion using a running 6-0 Prolene suture. At completion of anastomosis,  fistula thrill was noted to be adequate. The vein course was inspected in relation to the surrounding tissues ensure no kinking or twisting.  A thrill was appreciated through the fistula and palpable ulnar artery was felt in distal wrist. Hemostasis was obtained and the wound was closed using interrupted 3-0 Monocryl sutures, then  followed by a running 4-0 Monocryl subcuticular suture for the skin. Sterile dry dressing was applied. The patient tolerated the procedure well and was transferred to pacu for further recovery.

## 2018-05-28 NOTE — ANESTHESIA PREPROCEDURE EVALUATION
05/28/2018  Kylie King is a 62 y.o., female.    Anesthesia Evaluation    I have reviewed the Patient Summary Reports.    I have reviewed the Nursing Notes.   I have reviewed the Medications.     Review of Systems  Anesthesia Hx:  No problems with previous Anesthesia  History of prior surgery of interest to airway management or planning: Previous anesthesia: General Denies Family Hx of Anesthesia complications.   Denies Personal Hx of Anesthesia complications.   Cardiovascular:   Exercise tolerance: poor Hypertension CHF DAVIS    Pulmonary:   Shortness of breath    Renal/:   Chronic Renal Disease, ESRD, Dialysis    Hepatic/GI:   GERD    Neurological:   CVA, no residual symptoms    Endocrine:   Diabetes        Physical Exam  General:  Obesity    Airway/Jaw/Neck:  Airway Findings: Mouth Opening: Normal Tongue: Normal  General Airway Assessment: Adult  Mallampati: II  Improves to II with phonation.  TM Distance: Normal, at least 6 cm  Jaw/Neck Findings:  Neck ROM: Normal ROM      Dental:  Dental Findings: In tact   Chest/Lungs:  Chest/Lungs Findings: Clear to auscultation, Normal Respiratory Rate     Heart/Vascular:  Heart Findings: Rate: Normal  Rhythm: Regular Rhythm  Sounds: Normal        Mental Status:  Mental Status Findings:  Cooperative, Alert and Oriented         Anesthesia Plan  Type of Anesthesia, risks & benefits discussed:  Anesthesia Type:  general, MAC, regional  Patient's Preference:   Intra-op Monitoring Plan: standard ASA monitors  Intra-op Monitoring Plan Comments:   Post Op Pain Control Plan: multimodal analgesia  Post Op Pain Control Plan Comments:   Induction:   IV  Beta Blocker:         Informed Consent: Patient understands risks and agrees with Anesthesia plan.  Questions answered. Anesthesia consent signed with patient.  ASA Score: 4     Day of Surgery Review of History & Physical:     H&P update referred to the surgeon.         Ready For Surgery From Anesthesia Perspective.

## 2018-05-28 NOTE — TRANSFER OF CARE
"Anesthesia Transfer of Care Note    Patient: Kylie King    Procedure(s) Performed: Procedure(s) (LRB):  CREATION -FISTULA-AV (Right)    Patient location: PACU    Anesthesia Type: general    Transport from OR: Transported from OR on 6-10 L/min O2 by face mask with adequate spontaneous ventilation    Post pain: adequate analgesia    Post assessment: no apparent anesthetic complications and tolerated procedure well    Post vital signs: stable    Level of consciousness: awake, alert and oriented    Nausea/Vomiting: no nausea/vomiting    Complications: none    Transfer of care protocol was followed      Last vitals:   Visit Vitals  BP (!) 161/51 (BP Location: Right arm, Patient Position: Lying)   Pulse 77   Temp 36.8 °C (98.2 °F) (Oral)   Resp 16   Ht 5' 4" (1.626 m)   Wt 117.9 kg (260 lb)   SpO2 96%   BMI 44.63 kg/m²     "

## 2018-05-28 NOTE — PROGRESS NOTES
Dr. Bradshaw called to ask about IV placement- patient had left mastectomy and site of surgery today is in right arm. MD to call back.

## 2018-05-31 ENCOUNTER — TELEPHONE (OUTPATIENT)
Dept: INTERNAL MEDICINE | Facility: CLINIC | Age: 63
End: 2018-05-31

## 2018-05-31 DIAGNOSIS — R53.81 DEBILITY: Primary | ICD-10-CM

## 2018-05-31 NOTE — TELEPHONE ENCOUNTER
Spoke to Dionne with [facility] and gave her the orders from the doctor .   Told her the Doctor has to sign orders for the walker

## 2018-05-31 NOTE — TELEPHONE ENCOUNTER
Please give verbal okay to continue home health twice weekly for 4 weeks.  Order for a Rollator walker has been entered.  I will sign it when I return to the office tomorrow.

## 2018-05-31 NOTE — TELEPHONE ENCOUNTER
----- Message from Jennie Navarro sent at 5/30/2018  4:03 PM CDT -----  Contact: Dionne @ [facility] Cell # [phone]  She's calling in regards to if she can continue home health for the patient for two times a week for four more weeks? She also need to put in a order for a rollator walker for the patient. She said you can order this with [facility].

## 2018-06-01 ENCOUNTER — OFFICE VISIT (OUTPATIENT)
Dept: PODIATRY | Facility: CLINIC | Age: 63
End: 2018-06-01
Payer: COMMERCIAL

## 2018-06-01 VITALS
SYSTOLIC BLOOD PRESSURE: 95 MMHG | HEIGHT: 65 IN | DIASTOLIC BLOOD PRESSURE: 59 MMHG | WEIGHT: 260 LBS | BODY MASS INDEX: 43.32 KG/M2 | HEART RATE: 71 BPM

## 2018-06-01 DIAGNOSIS — L84 CORN OR CALLUS: ICD-10-CM

## 2018-06-01 DIAGNOSIS — E11.42 DIABETIC PERIPHERAL NEUROPATHY ASSOCIATED WITH TYPE 2 DIABETES MELLITUS: Primary | ICD-10-CM

## 2018-06-01 DIAGNOSIS — L97.512 SKIN ULCER OF RIGHT FOOT WITH FAT LAYER EXPOSED: ICD-10-CM

## 2018-06-01 DIAGNOSIS — B35.1 ONYCHOMYCOSIS DUE TO DERMATOPHYTE: ICD-10-CM

## 2018-06-01 PROCEDURE — 11042 DBRDMT SUBQ TIS 1ST 20SQCM/<: CPT | Mod: 59,S$GLB,, | Performed by: PODIATRIST

## 2018-06-01 PROCEDURE — 11721 DEBRIDE NAIL 6 OR MORE: CPT | Mod: 59,Q7,S$GLB, | Performed by: PODIATRIST

## 2018-06-01 PROCEDURE — 99213 OFFICE O/P EST LOW 20 MIN: CPT | Mod: 25,S$GLB,, | Performed by: PODIATRIST

## 2018-06-01 PROCEDURE — 11055 PARING/CUTG B9 HYPRKER LES 1: CPT | Mod: Q7,S$GLB,, | Performed by: PODIATRIST

## 2018-06-01 PROCEDURE — 99999 PR PBB SHADOW E&M-EST. PATIENT-LVL III: CPT | Mod: PBBFAC,,, | Performed by: PODIATRIST

## 2018-06-03 ENCOUNTER — PATIENT MESSAGE (OUTPATIENT)
Dept: VASCULAR SURGERY | Facility: CLINIC | Age: 63
End: 2018-06-03

## 2018-06-03 ENCOUNTER — PATIENT MESSAGE (OUTPATIENT)
Dept: INTERNAL MEDICINE | Facility: CLINIC | Age: 63
End: 2018-06-03

## 2018-06-04 ENCOUNTER — TELEPHONE (OUTPATIENT)
Dept: VASCULAR SURGERY | Facility: CLINIC | Age: 63
End: 2018-06-04

## 2018-06-04 DIAGNOSIS — Z99.2 ESRD (END STAGE RENAL DISEASE) ON DIALYSIS: Primary | ICD-10-CM

## 2018-06-04 DIAGNOSIS — N18.6 ESRD (END STAGE RENAL DISEASE) ON DIALYSIS: Primary | ICD-10-CM

## 2018-06-04 NOTE — TELEPHONE ENCOUNTER
The following meds have been added or changed:   Hydralazine 50 mg 3 times a day to 100 mg three times a day   Isosorb 10 mg 3 times a day added   Bumetanide 1 mg twice a day replacing Lasix   Sevelamer 800 mg three times a day     I need these to be sent to [facility] for 90 day supply     Please let me know as soon as it is sent.

## 2018-06-04 NOTE — PROGRESS NOTES
Subjective:      Patient ID: Kylie King is a 62 y.o. female.    Chief Complaint: Diabetes Mellitus (dr foote/03/14/2018) and Diabetic Foot Exam    Kylie is a 62 y.o. female who presents to the clinic for evaluation and treatment of high risk feet. Kylie has a past medical history of Anxiety; Breast cancer (1/2013); Choledocholithiasis; Colon cancer (2001); CRI (chronic renal insufficiency); GERD (gastroesophageal reflux disease); History of acute pancreatitis (2009); History of colon cancer; HTN (hypertension), benign; Hyperlipidemia; Intracerebral hemorrhage; Kidney stone; Obesity; Pancreatitis; Pancreatitis (2008); Stroke (12/2012); Tobacco abuse; and Type 2 diabetes mellitus with neurological manifestations, controlled. The patient's chief complaint is diabetic foot ulcer, right foot x2. Pt is also c/o thick elongated toenails and calluses.  This patient has documented high risk feet requiring routine maintenance secondary to peripheral neuropathy.    PCP: Virginia Foote MD    Date Last Seen by PCP: 3/14/2018    Current shoe gear:  Affected Foot: Football and Darco shoe on the affected foot     Unaffected Foot: Tennis shoes    History of Trauma: negative  Sign of Infection: none    Hemoglobin A1C   Date Value Ref Range Status   04/19/2018 6.8 (H) 4.0 - 5.6 % Final     Comment:     According to ADA guidelines, hemoglobin A1c <7.0% represents  optimal control in non-pregnant diabetic patients. Different  metrics may apply to specific patient populations.   Standards of Medical Care in Diabetes-2016.  For the purpose of screening for the presence of diabetes:  <5.7%     Consistent with the absence of diabetes  5.7-6.4%  Consistent with increasing risk for diabetes   (prediabetes)  >or=6.5%  Consistent with diabetes  Currently, no consensus exists for use of hemoglobin A1c  for diagnosis of diabetes for children.  This Hemoglobin A1c assay has significant interference with fetal   hemoglobin   (HbF). The  results are invalid for patients with abnormal amounts of   HbF,   including those with known Hereditary Persistence   of Fetal Hemoglobin. Heterozygous hemoglobin variants (HbAS, HbAC,   HbAD, HbAE, HbA2) do not significantly interfere with this assay;   however, presence of multiple variants in a sample may impact the %   interference.     01/29/2018 8.1 (H) 4.0 - 5.6 % Final     Comment:     According to ADA guidelines, hemoglobin A1c <7.0% represents  optimal control in non-pregnant diabetic patients. Different  metrics may apply to specific patient populations.   Standards of Medical Care in Diabetes-2016.  For the purpose of screening for the presence of diabetes:  <5.7%     Consistent with the absence of diabetes  5.7-6.4%  Consistent with increasing risk for diabetes   (prediabetes)  >or=6.5%  Consistent with diabetes  Currently, no consensus exists for use of hemoglobin A1c  for diagnosis of diabetes for children.  This Hemoglobin A1c assay has significant interference with fetal   hemoglobin   (HbF). The results are invalid for patients with abnormal amounts of   HbF,   including those with known Hereditary Persistence   of Fetal Hemoglobin. Heterozygous hemoglobin variants (HbAS, HbAC,   HbAD, HbAE, HbA2) do not significantly interfere with this assay;   however, presence of multiple variants in a sample may impact the %   interference.     03/17/2017 10.4 (H) 4.5 - 6.2 % Final     Comment:     According to ADA guidelines, hemoglobin A1C <7.0% represents  optimal control in non-pregnant diabetic patients.  Different  metrics may apply to specific populations.   Standards of Medical Care in Diabetes - 2016.  For the purpose of screening for the presence of diabetes:  <5.7%     Consistent with the absence of diabetes  5.7-6.4%  Consistent with increasing risk for diabetes   (prediabetes)  >or=6.5%  Consistent with diabetes  Currently no consensus exists for use of hemoglobin A1C  for diagnosis of diabetes  for children.         Review of Systems   Constitution: Negative for chills, fever and malaise/fatigue.   HENT: Negative for hearing loss.    Cardiovascular: Negative for claudication and leg swelling.   Respiratory: Negative for shortness of breath.    Skin: Positive for color change, dry skin, nail changes and unusual hair distribution. Negative for flushing and rash.   Musculoskeletal: Positive for joint pain. Negative for myalgias.   Neurological: Positive for numbness, paresthesias and sensory change.   Psychiatric/Behavioral: Negative for altered mental status.           Objective:      Physical Exam   Constitutional: She is oriented to person, place, and time. She appears well-developed and well-nourished. She is cooperative.   Cardiovascular:   Pulses:       Dorsalis pedis pulses are 0 on the right side, and 0 on the left side.        Posterior tibial pulses are 1+ on the right side, and 1+ on the left side.   Non-pitting edema noted to b/L LEs   Musculoskeletal: She exhibits edema and tenderness.        Right knee: She exhibits no swelling and no ecchymosis.        Left knee: She exhibits no swelling and no ecchymosis.        Right ankle: She exhibits normal range of motion, no swelling, no ecchymosis and normal pulse. No lateral malleolus, no medial malleolus and no head of 5th metatarsal tenderness found. Achilles tendon exhibits no pain, no defect and normal Ortiz's test results.        Left ankle: She exhibits normal range of motion, no swelling, no ecchymosis and normal pulse. No lateral malleolus, no medial malleolus and no head of 5th metatarsal tenderness found. Achilles tendon exhibits no pain and normal Ortiz's test results.        Right lower leg: She exhibits no tenderness, no bony tenderness, no swelling, no edema and no deformity.        Left lower leg: She exhibits no tenderness, no swelling and no edema.        Right foot: There is normal range of motion and no deformity.        Left  foot: There is normal range of motion and no deformity.   Decreased ROM with out joint pain nor crepitation to bilateral feet and ankle joints.  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 0 sites sensed.   Skin Integrity: Positive for ulcer.   Left Foot:   Protective Sensation: 5 sites tested. 0 sites sensed.   Skin Integrity: Positive for dry skin.   Neurological: She is alert and oriented to person, place, and time. A sensory deficit is present.   Absent protective sensation b/L   Skin: Skin is warm and dry. Capillary refill takes more than 3 seconds. No abrasion, no bruising, no burn and no ecchymosis noted.   HKL's:   Left~ distal 2nd digit  Right~ none  Nails x9 are elongated by  3-4mm's, thickened by 2-3 mm's, dystrophic, and are yellowishin  coloration . Xerosis Bilaterally. No open lesions noted.   Ulceration  Location: right heel  Measurements: 1.5x 1.8x 0.2cm  Drainage: serous  Wound base: fibrotic/granular mixed base  SOI: none  Ulceration  Location: right dorsal ankle  Measurements: 4.5x 4.5x 0.1  Drainage: serous  Wound base: granular with some non-viable tissue  SOI: none     Psychiatric: She has a normal mood and affect. Her speech is normal and behavior is normal. She is attentive.   Vitals reviewed.            Assessment:       Encounter Diagnoses   Name Primary?    Diabetic peripheral neuropathy associated with type 2 diabetes mellitus Yes    Skin ulcer of right foot with fat layer exposed          Plan:       Kylie was seen today for diabetes mellitus and diabetic foot exam.    Diagnoses and all orders for this visit:    Diabetic peripheral neuropathy associated with type 2 diabetes mellitus  -     SUBSEQUENT HOME HEALTH ORDERS    Skin ulcer of right foot with fat layer exposed  -     SUBSEQUENT HOME HEALTH ORDERS      I counseled the patient on her conditions, their implications and medical  management.      Wound Debridement  Date/Time: 6/1/2018 3:00 PM  Performed by: SHRUTHI ALLEN  Authorized by: SHRUTHI ALLEN     Consent Done?:  Yes (Verbal)  Local anesthesia used?: No      Wound Details:    Location:  Right foot    Location:  Right Heel       Length (cm):  1.5       Area (sq cm):  2.7       Width (cm):  1.8       Depth (cm):  0.2    Depth of debridement:  Subcutaneous tissue    Tissue debrided:  Subcutaneous    Devitalized tissue debrided:  Callus and Fibrin    Instruments:  Curette    2nd Wound Details:     Location:  Right foot    Location:  Right Ankle    Location:  Right Ankle       Length (cm):  4.5       Area (sq cm):  20.25       Width (cm):  4.5       Depth (cm):  0.1    Depth of debridement:  Epidermis/Dermis    Tissue debrided:  Epidermis and Dermis    Devitalized tissue debrided:  Callus    Bleeding:  Minimal  Hemostasis Achieved: Yes    Method Used:  Pressure  Patient tolerance:  Patient tolerated the procedure well with no immediate complications     Cleanse wound with normal saline or wound . Dry well.apply iodosorb to both wound(s). Top with football dressing consisting 3 rolls of cast padding. Top with coban .Change three times a  Week  HH orders sent to [facility].        After cleansing with an alcohol prep pad, the about mentioned hyperkeratotic lesions were sharply debrided X 1 utilizing a #15 blade to a smooth base without incident. Pt tolerated the procedure well and reported comfort to the debarment sites. Pt will continue to use padding and moisture the callused areas.   With the patient's permission, nails were aggressively reduced and debrided X 9 to their soft tissue attachment mechanically and with an electric  removing all offending nail and debris. Pt relates relief following the procedure. She will continue to monitor the areas daily, inspect her feet, wear protective shoe gear when ambulating, moisturized daily to maintain skin integrity and follow  up in the office in 3 months for toenails.  RTC in 2 weeks or sooner if any new pedal problems should arise or if condition worsens.          .

## 2018-06-05 NOTE — ANESTHESIA POSTPROCEDURE EVALUATION
"Anesthesia Post Evaluation    Patient: Kylie Knig    Procedure(s) Performed: Procedure(s) (LRB):  CREATION -FISTULA-AV (Right)    Final Anesthesia Type: general  Patient location during evaluation: [facility]  Patient participation: Yes- Able to Participate  Level of consciousness: awake and alert  Post-procedure vital signs: reviewed and stable  Pain management: adequate  Airway patency: patent  PONV status at discharge: No PONV  Anesthetic complications: no      Cardiovascular status: blood pressure returned to baseline  Respiratory status: unassisted  Hydration status: euvolemic  Follow-up not needed.        Visit Vitals  BP (!) 150/64   Pulse 66   Temp 36.8 °C (98.2 °F) (Temporal)   Resp (!) 22   Ht 5' 4" (1.626 m)   Wt 117.9 kg (260 lb)   SpO2 97%   BMI 44.63 kg/m²       Pain/Mario Score: No Data Recorded      "

## 2018-06-06 RX ORDER — BUMETANIDE 1 MG/1
1 TABLET ORAL 2 TIMES DAILY
Qty: 180 TABLET | Refills: 3 | Status: SHIPPED | OUTPATIENT
Start: 2018-06-06 | End: 2018-10-11 | Stop reason: SDUPTHER

## 2018-06-06 RX ORDER — ISOSORBIDE DINITRATE 10 MG/1
10 TABLET ORAL 3 TIMES DAILY
Qty: 270 TABLET | Refills: 3 | Status: SHIPPED | OUTPATIENT
Start: 2018-06-06 | End: 2018-08-13

## 2018-06-06 RX ORDER — SEVELAMER CARBONATE 800 MG/1
800 TABLET, FILM COATED ORAL
Qty: 270 TABLET | Refills: 3 | Status: SHIPPED | OUTPATIENT
Start: 2018-06-06 | End: 2018-08-31

## 2018-06-06 RX ORDER — HYDRALAZINE HYDROCHLORIDE 50 MG/1
50 TABLET, FILM COATED ORAL 3 TIMES DAILY
Qty: 270 TABLET | Refills: 3 | Status: SHIPPED | OUTPATIENT
Start: 2018-06-06 | End: 2018-10-01 | Stop reason: SDUPTHER

## 2018-06-07 ENCOUNTER — TELEPHONE (OUTPATIENT)
Dept: ADMINISTRATIVE | Facility: CLINIC | Age: 63
End: 2018-06-07

## 2018-06-07 NOTE — PROGRESS NOTES
Home Health SOC for Dr. Emelyn Velásquez with [facility]. SN services provided

## 2018-06-08 ENCOUNTER — OFFICE VISIT (OUTPATIENT)
Dept: CARDIOLOGY | Facility: CLINIC | Age: 63
End: 2018-06-08
Attending: INTERNAL MEDICINE
Payer: COMMERCIAL

## 2018-06-08 VITALS
DIASTOLIC BLOOD PRESSURE: 79 MMHG | SYSTOLIC BLOOD PRESSURE: 133 MMHG | HEART RATE: 77 BPM | WEIGHT: 263 LBS | BODY MASS INDEX: 44.04 KG/M2

## 2018-06-08 DIAGNOSIS — Z86.73 HISTORY OF CEREBROVASCULAR ACCIDENT: ICD-10-CM

## 2018-06-08 DIAGNOSIS — J43.8 OTHER EMPHYSEMA: ICD-10-CM

## 2018-06-08 DIAGNOSIS — Z87.891 FORMER SMOKER: ICD-10-CM

## 2018-06-08 DIAGNOSIS — Z99.2 ESRD (END STAGE RENAL DISEASE) ON DIALYSIS: ICD-10-CM

## 2018-06-08 DIAGNOSIS — I50.33 ACUTE ON CHRONIC DIASTOLIC CONGESTIVE HEART FAILURE: ICD-10-CM

## 2018-06-08 DIAGNOSIS — I61.3 PONTINE HEMORRHAGE: ICD-10-CM

## 2018-06-08 DIAGNOSIS — E78.49 OTHER HYPERLIPIDEMIA: ICD-10-CM

## 2018-06-08 DIAGNOSIS — R09.02 HYPOXEMIA: ICD-10-CM

## 2018-06-08 DIAGNOSIS — E66.01 MORBID OBESITY WITH BMI OF 45.0-49.9, ADULT: ICD-10-CM

## 2018-06-08 DIAGNOSIS — Z85.038 HISTORY OF COLON CANCER: ICD-10-CM

## 2018-06-08 DIAGNOSIS — N18.6 ESRD (END STAGE RENAL DISEASE) ON DIALYSIS: ICD-10-CM

## 2018-06-08 DIAGNOSIS — I10 UNCONTROLLED HYPERTENSION: ICD-10-CM

## 2018-06-08 DIAGNOSIS — C50.512 MALIGNANT NEOPLASM OF LOWER-OUTER QUADRANT OF LEFT FEMALE BREAST, UNSPECIFIED ESTROGEN RECEPTOR STATUS: ICD-10-CM

## 2018-06-08 PROBLEM — J44.9 CHRONIC OBSTRUCTIVE PULMONARY DISEASE: Status: ACTIVE | Noted: 2018-06-08

## 2018-06-08 PROCEDURE — 99205 OFFICE O/P NEW HI 60 MIN: CPT | Mod: S$GLB,,, | Performed by: INTERNAL MEDICINE

## 2018-06-08 NOTE — LETTER
June 8, 2018      Virginia Foote MD  [address] [address]           [facility]  [address], [address]  [facility] [address]  Phone: [phone]  Fax: [phone]          Patient: Kylie King   MR Number: [id number]   YOB: 1955   Date of Visit: 6/8/2018       Dear Dr. Virginia Foote:    Thank you for referring Kylie King to me for evaluation. Attached you will find relevant portions of my assessment and plan of care.    If you have questions, please do not hesitate to call me. I look forward to following Kylie King along with you.    Sincerely,    Jessie Salas MD    Enclosure  CC:  No Recipients    If you would like to receive this communication electronically, please contact [email or web address] or [phone] to request more information on [facility] access.    For providers and/or their staff who would like to refer a patient to [facility], please contact us through our one-stop-shop provider referral line, [facility], at [phone].    If you feel you have received this communication in error or would no longer like to receive these types of communications, please e-mail [email or web address]

## 2018-06-08 NOTE — PROGRESS NOTES
Subjective:     Kylie King is a 62 y.o. female with hypertension, hypercholesterolemia and diabetes mellitus, type 2. She is morbidly obese. She is a former smoker.She has suffered a cerebrovascular accident and also had an intracranial bleed. She has had colectomy for colon cancer and mastectomy for breast cancer. She is on home O2 for chronic obstructive pulmonary disease with hypoxemia. She began hemodialysis in 4/2018 after she presented fluid overloaded. An Echocardiogram at that time revealed finding consistent with hypertensive heart disease and diastolic dysfunction. She denies any exertional chest pain. She denies palpitations or weak spells. Feeling fair overall.     Congestive Heart Failure   Presents for initial visit. The disease course has been improving. Associated symptoms include shortness of breath. Pertinent negatives include no abdominal pain, chest pain, chest pressure, claudication, edema, fatigue, muscle weakness, near-syncope, nocturia, orthopnea, palpitations, paroxysmal nocturnal dyspnea or unexpected weight change. The symptoms have been improving.   Cerebrovascular Accident   This is a chronic problem. The current episode started more than 1 year ago. The problem has been resolved. Pertinent negatives include no abdominal pain, anorexia, arthralgias, change in bowel habit, chest pain, chills, congestion, coughing, diaphoresis, fatigue, fever, headaches, joint swelling, myalgias, nausea, neck pain, numbness, rash, sore throat, swollen glands, urinary symptoms, vertigo, visual change, vomiting or weakness.   Hypertension   This is a chronic problem. The current episode started more than 1 year ago. The problem is unchanged. The problem is uncontrolled (usually 140-150/70-80 mmHg at home). Associated symptoms include shortness of breath. Pertinent negatives include no anxiety, blurred vision, chest pain, headaches, malaise/fatigue, neck pain, orthopnea, palpitations, peripheral edema,  PND or sweats. Identifiable causes of hypertension include chronic renal disease.   Hyperlipidemia   This is a chronic problem. The current episode started more than 1 year ago. The problem is controlled. Exacerbating diseases include chronic renal disease, diabetes and obesity. She has no history of hypothyroidism, liver disease or nephrotic syndrome. Associated symptoms include shortness of breath. Pertinent negatives include no chest pain, focal sensory loss, focal weakness, leg pain or myalgias.       Review of Systems   Constitution: Negative for chills, diaphoresis, fatigue, fever, weakness, malaise/fatigue and unexpected weight change.   HENT: Negative for congestion, nosebleeds and sore throat.    Eyes: Negative for blurred vision, double vision, vision loss in left eye and vision loss in right eye.   Cardiovascular: Positive for dyspnea on exertion. Negative for chest pain, claudication, irregular heartbeat, leg swelling, near-syncope, orthopnea, palpitations, paroxysmal nocturnal dyspnea and syncope.   Respiratory: Positive for shortness of breath. Negative for cough, hemoptysis and wheezing.    Endocrine: Negative for cold intolerance and heat intolerance.   Hematologic/Lymphatic: Negative for bleeding problem. Does not bruise/bleed easily.   Skin: Negative for color change and rash.   Musculoskeletal: Negative for arthralgias, back pain, falls, joint swelling, muscle weakness, myalgias and neck pain.   Gastrointestinal: Negative for abdominal pain, anorexia, change in bowel habit, heartburn, hematemesis, hematochezia, hemorrhoids, jaundice, melena, nausea and vomiting.   Genitourinary: Negative for dysuria, hematuria and nocturia.   Neurological: Negative for dizziness, focal weakness, headaches, light-headedness, loss of balance, numbness and vertigo.   Psychiatric/Behavioral: Negative for altered mental status, depression and memory loss. The patient is not nervous/anxious.    Allergic/Immunologic:  "Negative for hives and persistent infections.       Current Outpatient Prescriptions on File Prior to Visit   Medication Sig Dispense Refill    acetaminophen-codeine 300-30mg (TYLENOL #3) 300-30 mg Tab TAKE ONE TABLET BY MOUTH EVERY 6 HOURS AS NEEDED 90 tablet 2    albuterol 90 mcg/actuation inhaler Inhale 2 puffs into the lungs every 6 (six) hours as needed for Wheezing. Rescue 18 g 0    amLODIPine (NORVASC) 10 MG tablet Take 1 tablet (10 mg total) by mouth every evening. 90 tablet 3    BD INSULIN PEN NEEDLE UF MINI 31 gauge x 3/16" Ndle USE 5 NEEDLES PER  each 2    blood sugar diagnostic (ONETOUCH ULTRA TEST) Strp 1 strip by Misc.(Non-Drug; Combo Route) route 4 (four) times daily before meals and nightly. 400 strip 4    bumetanide (BUMEX) 1 MG tablet Take 1 tablet (1 mg total) by mouth 2 (two) times daily. 180 tablet 3    carvedilol (COREG) 25 MG tablet Take 1 tablet (25 mg total) by mouth 2 (two) times daily. (Patient taking differently: Take 25 mg by mouth 3 (three) times daily. ) 180 tablet 4    citalopram (CELEXA) 20 MG tablet Take 1 tablet (20 mg total) by mouth nightly. 90 tablet 3    gabapentin (NEURONTIN) 400 MG capsule Take 1 capsule (400 mg total) by mouth 2 (two) times daily. 180 capsule 3    hydrALAZINE (APRESOLINE) 50 MG tablet Take 1 tablet (50 mg total) by mouth 3 (three) times daily. 270 tablet 3    HYDROcodone-acetaminophen (NORCO) 5-325 mg per tablet Take 1 tablet by mouth every 6 (six) hours as needed for Pain. 35 tablet 0    insulin detemir U-100 (LEVEMIR FLEXTOUCH U-100 INSULN) 100 unit/mL (3 mL) SubQ InPn pen Inject 15 Units into the skin every evening. (Patient taking differently: Inject 45 Units into the skin every evening. Take 45 unit in am and pm) 75 mL 3    insulin lispro (HUMALOG KWIKPEN INSULIN) 100 unit/mL InPn pen BLOOD GLUCOSE 150-200, INJECT 1 UNITS UNDER THE SKIN, 201-250, 2 UNITS, 251-300, 3 UNITS THREE TIMES A DAY AS DIRECTED      insulin needles, " "disposable, (PEN NEEDLE, DIABETIC) 31 Ndle Patient needs 5 needles a day 500 each 3    insulin needles, disposable, 32 x 5/32 " Ndle 1 Device by Misc.(Non-Drug; Combo Route) route 5 (five) times daily. 400 each 6    isosorbide dinitrate (ISORDIL) 10 MG tablet Take 1 tablet (10 mg total) by mouth 3 (three) times daily. 270 tablet 3    lancets (ONETOUCH DELICA LANCETS) 33 gauge Misc 1 lancet by Misc.(Non-Drug; Combo Route) route 4 (four) times daily before meals and nightly. 400 each 4    letrozole (FEMARA) 2.5 mg Tab Take 1 tablet (2.5 mg total) by mouth every evening. 90 tablet 3    miconazole NITRATE 2 % (MICOTIN) 2 % top powder Apply topically 2 (two) times daily. Apply to groin and skin folds  0    pravastatin (PRAVACHOL) 40 MG tablet Take 1 tablet (40 mg total) by mouth every evening. 90 tablet 3    ranitidine (ZANTAC) 150 MG capsule Take 1 capsule (150 mg total) by mouth nightly. 90 capsule 3    sevelamer carbonate (RENVELA) 800 mg Tab Take 1 tablet (800 mg total) by mouth 3 (three) times daily with meals. 270 tablet 3    oxybutynin (DITROPAN XL) 15 MG TR24 Take 1 tablet (15 mg total) by mouth once daily. 90 tablet 4     No current facility-administered medications on file prior to visit.        /79   Pulse 77   Wt 119.3 kg (263 lb)   BMI 44.04 kg/m²       Objective:     Physical Exam   Constitutional: She is oriented to person, place, and time. She appears well-developed and well-nourished.  Non-toxic appearance. She appears ill. No distress.   HENT:   Head: Normocephalic and atraumatic.   Nose: Nose normal.   Eyes: Right eye exhibits no discharge. Left eye exhibits no discharge. Right conjunctiva is not injected. Left conjunctiva is not injected. Right pupil is round. Left pupil is round. Pupils are equal.   Neck: Neck supple. No JVD present. Carotid bruit is not present. No thyromegaly present.   Cardiovascular: Normal rate, regular rhythm, S1 normal and S2 normal.   No extrasystoles are " present. PMI is not displaced.  Exam reveals gallop and S4.    Murmur heard.   Midsystolic murmur is present with a grade of 2/6  at the upper right sternal border  Pulses:       Radial pulses are 2+ on the right side, and 2+ on the left side.        Femoral pulses are 2+ on the right side, and 2+ on the left side.  Pulmonary/Chest: Effort normal and breath sounds normal.   Sam right upper chest.   Abdominal: Soft. Normal appearance. There is no hepatosplenomegaly. There is no tenderness.   Musculoskeletal:        Right ankle: She exhibits no swelling, no ecchymosis and no deformity.        Left ankle: She exhibits no swelling, no ecchymosis and no deformity.   Lymphadenopathy:        Head (right side): No submandibular adenopathy present.        Head (left side): No submandibular adenopathy present.     She has no cervical adenopathy.   Neurological: She is alert and oriented to person, place, and time. She is not disoriented. No cranial nerve deficit.   Skin: Skin is warm, dry and intact. She is not diaphoretic. No erythema. Nails show no clubbing.   Psychiatric: She has a normal mood and affect. Her speech is normal and behavior is normal. Judgment and thought content normal. Cognition and memory are normal.       Assessment:     1. Acute on chronic diastolic congestive heart failure    2. Pontine hemorrhage    3. History of cerebrovascular accident    4. Uncontrolled hypertension    5. Other hyperlipidemia    6. DM type 2, uncontrolled, with neuropathy    7. Morbid obesity with BMI of 45.0-49.9, adult    8. Former smoker    9. ESRD (end stage renal disease) on dialysis    10. Other emphysema    11. Hypoxemia    12. History of colon cancer    13. Malignant neoplasm of lower-outer quadrant of left female breast, unspecified estrogen receptor status        Plan:     1. Heart Failure, Diastolic, Chronic   4/20/2018: Echo: Normal left ventricular size with low normal systolic function. EF 50-55%. Mild LVH.  Moderate diastolic dysfunction.    Appears fairly  well compensated.    2. History of Intracranial Bleed   12/2012: Pontine bleed.    3. History of Cerebrovascular Accident   12/2012: CVA. Sequela is mildly affected writing.    4. Hypertension   1999: Diagnosed.   On carvedilol 25 mg Q8, amlodipine 10 mg Q24,hydralazine 100 mg Q8 and isosorbidedinitrate 10 mg Q8.   Appears reasonably well controlled.     5. Hypercholesterolemia   2016: Began statin.   On pravastatin 40 mg Q24.    6. Diabetes Mellitus, Type 2   2007: Diagnosed. Complications: CVA & ESRD. Medications: Insulin.    7. Morbid Obesity   6/8/2018: Weight 119 kg. BMI 44.   Encouraged to lose weight.    8. Former Smoker   1990: Began smoking. 1/2 ppd.   2018: Quit smoking.    9. End Stage Kidney Disease   4/2018: Began HD.   Fistula right wrist.    10. Chronic Obstructive Pulmonary Disease   4/2018: Diagnosed.   Began home O2.    11. History of Colon Cancer   2000: Partial colectomy.    12. History of Breast Cancer   2013: left mastectomy.    13. Primary Care   Dr. Virginia Foote.    F/u 2 months.    Jessie Salas M.D.

## 2018-06-10 ENCOUNTER — PATIENT MESSAGE (OUTPATIENT)
Dept: CARDIOLOGY | Facility: CLINIC | Age: 63
End: 2018-06-10

## 2018-06-11 ENCOUNTER — OFFICE VISIT (OUTPATIENT)
Dept: INTERNAL MEDICINE | Facility: CLINIC | Age: 63
End: 2018-06-11
Payer: COMMERCIAL

## 2018-06-11 VITALS
HEART RATE: 76 BPM | SYSTOLIC BLOOD PRESSURE: 164 MMHG | RESPIRATION RATE: 16 BRPM | OXYGEN SATURATION: 96 % | DIASTOLIC BLOOD PRESSURE: 98 MMHG | TEMPERATURE: 99 F | HEIGHT: 64 IN

## 2018-06-11 DIAGNOSIS — R42 DIZZINESS: ICD-10-CM

## 2018-06-11 DIAGNOSIS — M48.062 SPINAL STENOSIS OF LUMBAR REGION WITH NEUROGENIC CLAUDICATION: Primary | ICD-10-CM

## 2018-06-11 DIAGNOSIS — N18.6 ESRD (END STAGE RENAL DISEASE): ICD-10-CM

## 2018-06-11 PROCEDURE — 99213 OFFICE O/P EST LOW 20 MIN: CPT | Mod: S$GLB,,, | Performed by: INTERNAL MEDICINE

## 2018-06-11 PROCEDURE — 99999 PR PBB SHADOW E&M-EST. PATIENT-LVL III: CPT | Mod: PBBFAC,,, | Performed by: INTERNAL MEDICINE

## 2018-06-11 NOTE — PROGRESS NOTES
CC: followup of hypertension  HPI:  The patient is a 62 y.o. year old female who presents to the office for followup of hypertension.  The patient denies any shortness of breath, headache, blurred vision, excessive fatigue, nausea or vomiting.  The patient reports chest tightness with activity.  She complains of feeling lightheaded since hydralazine was increased to 100 mg three times a day.  However, she is only taking it twice a day.  She continues to complain of right knee pain.  She complains of dizziness since her discharge from the hospital.    PAST MEDICAL HISTORY:  Past Medical History:   Diagnosis Date    Anxiety     Breast cancer 1/2013    left breast- invasive mammary carcinoma, ER/VA positive, Her2 negative    Choledocholithiasis     s/p ERCP and stent placement    Chronic obstructive pulmonary disease 6/8/2018    Colon cancer 2001    CRI (chronic renal insufficiency)     one kidney    Former smoker 6/8/2018    GERD (gastroesophageal reflux disease)     History of acute pancreatitis 2009 2/09 s/p sphincterotomy    History of cerebrovascular accident 6/8/2018    History of colon cancer     s/p sigmoid colectomy    HTN (hypertension), benign     Hyperlipidemia     Hypoxemia 6/8/2018    Intracerebral hemorrhage     Kidney stone     left    Obesity     Pancreatitis     Pancreatitis 2008    Stroke 12/2012    Tobacco abuse     Type 2 diabetes mellitus with neurological manifestations, controlled        SURGICAL HISTORY:  Past Surgical History:   Procedure Laterality Date    AV FISTULA PLACEMENT Right 5/28/2018    Procedure: CREATION -FISTULA-AV;  Surgeon: RICK Pollard III, MD;  Location: [facility] OR [address] [place] [address];  Service: Peripheral Vascular;  Laterality: Right;    BREAST BIOPSY  1/2012    left breast invasive mammary carcinoma (lateral bx) and intraductal papilloma (medial bx)    BREAST BIOPSY  1999    rt breast FA    CHOLECYSTECTOMY  2001    COLON SURGERY      HERNIA REPAIR       left nephrectomy      atrophic kidney and hydronephrosis    left oopherectomy  2001    MASTECTOMY  2013    left    NEPHRECTOMY  2011    lap    SIGMOIDECTOMY  2001    TOTAL REDUCTION MAMMOPLASTY Right 2013       MEDS:  Medcard reviewed and updated    ALLERGIES: Allergy Card reviewed and updated    SOCIAL HISTORY:   The patient is a nonsmoker.    PE:   APPEARANCE: Well nourished, well developed, in no acute distress.    EYES: Sclerae anicteric. PERRL. EOMI.      EARS: TM's intact. No retraction or perforation.    NOSE: Mucosa pink. Airway clear.  MOUTH & THROAT: No tonsillar enlargement. No pharyngeal erythema or exudate. No stridor.  CHEST: Lungs clear to auscultation with unlabored respirations.  CARDIOVASCULAR: Normal S1, S2. No murmurs. No carotid bruits. No pedal edema.  ABDOMEN: Bowel sounds normal. Not distended. Soft. No tenderness or masses.   NEUROLOGIC: Cranial Nerves: Intact.  PSYCHIATRIC: The patient is oriented to person, place, and time and has a pleasant affect.        ASSESSMENT/PLAN:  Kylie was seen today for follow-up.    Diagnoses and all orders for this visit:    Spinal stenosis of lumbar region with neurogenic claudication  -     Continue pain control    Uncontrolled type 2 diabetes mellitus with stage 4 chronic kidney disease, with long-term current use of insulin  -     POCT Glucose    ESRD (end stage renal disease)  -     Continue hemodialysis  -     Patient's nephrologist is Dr. Fide Siegel        Office phone number is [phone]        Office fax is [phone]    Dizziness  -     CAR Ultrasound doppler carotid bilateral; Future

## 2018-06-15 ENCOUNTER — PATIENT MESSAGE (OUTPATIENT)
Dept: INTERNAL MEDICINE | Facility: CLINIC | Age: 63
End: 2018-06-15

## 2018-06-15 ENCOUNTER — OFFICE VISIT (OUTPATIENT)
Dept: PODIATRY | Facility: CLINIC | Age: 63
End: 2018-06-15
Payer: COMMERCIAL

## 2018-06-15 VITALS
DIASTOLIC BLOOD PRESSURE: 74 MMHG | HEART RATE: 71 BPM | HEIGHT: 65 IN | BODY MASS INDEX: 43.82 KG/M2 | SYSTOLIC BLOOD PRESSURE: 114 MMHG | WEIGHT: 263 LBS

## 2018-06-15 DIAGNOSIS — L97.412 SKIN ULCER OF RIGHT HEEL WITH FAT LAYER EXPOSED: ICD-10-CM

## 2018-06-15 DIAGNOSIS — L97.312 SKIN ULCER OF RIGHT ANKLE WITH FAT LAYER EXPOSED: ICD-10-CM

## 2018-06-15 PROCEDURE — 99999 PR PBB SHADOW E&M-EST. PATIENT-LVL III: CPT | Mod: PBBFAC,,, | Performed by: PODIATRIST

## 2018-06-15 PROCEDURE — 99499 UNLISTED E&M SERVICE: CPT | Mod: S$GLB,,, | Performed by: PODIATRIST

## 2018-06-15 PROCEDURE — 11042 DBRDMT SUBQ TIS 1ST 20SQCM/<: CPT | Mod: S$GLB,,, | Performed by: PODIATRIST

## 2018-06-19 NOTE — PROGRESS NOTES
Subjective:      Patient ID: Kylie King is a 62 y.o. female.    Chief Complaint: Diabetic Foot Exam (dr foote/06/11/2018) and Diabetes Mellitus    Kylie is a 62 y.o. female who presents to the clinic for evaluation and treatment of high risk feet. Kylie has a past medical history of Anxiety; Breast cancer (1/2013); Choledocholithiasis; Chronic obstructive pulmonary disease (6/8/2018); Colon cancer (2001); CRI (chronic renal insufficiency); ESRD (end stage renal disease) on dialysis; Former smoker (6/8/2018); GERD (gastroesophageal reflux disease); History of acute pancreatitis (2009); History of cerebrovascular accident (6/8/2018); History of colon cancer; HTN (hypertension), benign; Hyperlipidemia; Hypoxemia (6/8/2018); Intracerebral hemorrhage; Kidney stone; Obesity; Pancreatitis; Pancreatitis (2008); Stroke (12/2012); Tobacco abuse; and Type 2 diabetes mellitus with neurological manifestations, controlled. The patient's chief complaint is diabetic foot ulcer, right foot x2.   PCP: Virginia Foote MD    Date Last Seen by PCP: 3/14/2018    Current shoe gear:  Affected Foot: Football and Darco shoe on the affected foot     Unaffected Foot: Tennis shoes    History of Trauma: negative  Sign of Infection: none    Hemoglobin A1C   Date Value Ref Range Status   04/19/2018 6.8 (H) 4.0 - 5.6 % Final     Comment:     According to ADA guidelines, hemoglobin A1c <7.0% represents  optimal control in non-pregnant diabetic patients. Different  metrics may apply to specific patient populations.   Standards of Medical Care in Diabetes-2016.  For the purpose of screening for the presence of diabetes:  <5.7%     Consistent with the absence of diabetes  5.7-6.4%  Consistent with increasing risk for diabetes   (prediabetes)  >or=6.5%  Consistent with diabetes  Currently, no consensus exists for use of hemoglobin A1c  for diagnosis of diabetes for children.  This Hemoglobin A1c assay has significant interference with fetal    hemoglobin   (HbF). The results are invalid for patients with abnormal amounts of   HbF,   including those with known Hereditary Persistence   of Fetal Hemoglobin. Heterozygous hemoglobin variants (HbAS, HbAC,   HbAD, HbAE, HbA2) do not significantly interfere with this assay;   however, presence of multiple variants in a sample may impact the %   interference.     01/29/2018 8.1 (H) 4.0 - 5.6 % Final     Comment:     According to ADA guidelines, hemoglobin A1c <7.0% represents  optimal control in non-pregnant diabetic patients. Different  metrics may apply to specific patient populations.   Standards of Medical Care in Diabetes-2016.  For the purpose of screening for the presence of diabetes:  <5.7%     Consistent with the absence of diabetes  5.7-6.4%  Consistent with increasing risk for diabetes   (prediabetes)  >or=6.5%  Consistent with diabetes  Currently, no consensus exists for use of hemoglobin A1c  for diagnosis of diabetes for children.  This Hemoglobin A1c assay has significant interference with fetal   hemoglobin   (HbF). The results are invalid for patients with abnormal amounts of   HbF,   including those with known Hereditary Persistence   of Fetal Hemoglobin. Heterozygous hemoglobin variants (HbAS, HbAC,   HbAD, HbAE, HbA2) do not significantly interfere with this assay;   however, presence of multiple variants in a sample may impact the %   interference.     03/17/2017 10.4 (H) 4.5 - 6.2 % Final     Comment:     According to ADA guidelines, hemoglobin A1C <7.0% represents  optimal control in non-pregnant diabetic patients.  Different  metrics may apply to specific populations.   Standards of Medical Care in Diabetes - 2016.  For the purpose of screening for the presence of diabetes:  <5.7%     Consistent with the absence of diabetes  5.7-6.4%  Consistent with increasing risk for diabetes   (prediabetes)  >or=6.5%  Consistent with diabetes  Currently no consensus exists for use of hemoglobin  A1C  for diagnosis of diabetes for children.         Review of Systems   Constitution: Negative for chills, fever and malaise/fatigue.   HENT: Negative for hearing loss.    Cardiovascular: Negative for claudication and leg swelling.   Respiratory: Negative for shortness of breath.    Skin: Positive for color change, dry skin, nail changes, poor wound healing and unusual hair distribution. Negative for flushing and rash.   Musculoskeletal: Positive for joint pain. Negative for myalgias.   Neurological: Positive for numbness, paresthesias and sensory change.   Psychiatric/Behavioral: Negative for altered mental status.           Objective:      Physical Exam   Constitutional: She is oriented to person, place, and time. She appears well-developed and well-nourished. She is cooperative.   Cardiovascular:   Pulses:       Dorsalis pedis pulses are 0 on the right side, and 0 on the left side.        Posterior tibial pulses are 1+ on the right side, and 1+ on the left side.   Non-pitting edema noted to b/L LEs   Musculoskeletal: She exhibits edema and tenderness.        Right knee: She exhibits no swelling and no ecchymosis.        Left knee: She exhibits no swelling and no ecchymosis.        Right ankle: She exhibits normal range of motion, no swelling, no ecchymosis and normal pulse. No lateral malleolus, no medial malleolus and no head of 5th metatarsal tenderness found. Achilles tendon exhibits no pain, no defect and normal Ortiz's test results.        Left ankle: She exhibits normal range of motion, no swelling, no ecchymosis and normal pulse. No lateral malleolus, no medial malleolus and no head of 5th metatarsal tenderness found. Achilles tendon exhibits no pain and normal Ortiz's test results.        Right lower leg: She exhibits no tenderness, no bony tenderness, no swelling, no edema and no deformity.        Left lower leg: She exhibits no tenderness, no swelling and no edema.        Right foot: There is  normal range of motion and no deformity.        Left foot: There is normal range of motion and no deformity.   Decreased ROM with out joint pain nor crepitation to bilateral feet and ankle joints.  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 0 sites sensed.   Skin Integrity: Positive for ulcer.   Left Foot:   Protective Sensation: 5 sites tested. 0 sites sensed.   Skin Integrity: Positive for dry skin.   Neurological: She is alert and oriented to person, place, and time. A sensory deficit is present.   Absent protective sensation b/L   Skin: Skin is warm and dry. Capillary refill takes more than 3 seconds. No abrasion, no bruising, no burn and no ecchymosis noted.   Ulceration  Location: right heel  Measurements: 1.7x 2.1x 0.3cm  Drainage: serous  Wound base: fibrotic/granular mixed base  SOI: none  Ulceration  Location: right dorsal ankle  Measurements: 2.5x 6.0x 0.2  Drainage: serous  Wound base: fibrotic/granular mixed base  SOI: none     Psychiatric: She has a normal mood and affect. Her speech is normal and behavior is normal. She is attentive.   Vitals reviewed.            Assessment:       Encounter Diagnoses   Name Primary?    DM type 2, uncontrolled, with neuropathy Yes    Skin ulcer of right heel with fat layer exposed     Skin ulcer of right ankle with fat layer exposed          Plan:       Kylie was seen today for diabetic foot exam and diabetes mellitus.    Diagnoses and all orders for this visit:    DM type 2, uncontrolled, with neuropathy    Skin ulcer of right heel with fat layer exposed    Skin ulcer of right ankle with fat layer exposed      I counseled the patient on her conditions, their implications and medical management.      Wound Debridement  Date/Time: 6/15/2018 12:00 PM  Performed by: SHRUTHI ALLEN  Authorized by: SHRUTHI ALLEN     Consent Done?:  Yes (Verbal)  Local anesthesia  used?: No      Wound Details:    Location:  Right foot    Location:  Right Ankle       Length (cm):  2.5       Area (sq cm):  15       Width (cm):  6       Depth (cm):  0.2    Depth of debridement:  Subcutaneous tissue    Tissue debrided:  Subcutaneous    Devitalized tissue debrided:  Callus and Fibrin    Instruments:  Nippers    Additional wounds:  1    2nd Wound Details:     Location:  Right foot    Location:  Right Heel    Location:  Right Heel       Length (cm):  1.7       Area (sq cm):  3.57       Width (cm):  2.1       Depth (cm):  0.3    Depth of debridement:  Subcutaneous tissue    Tissue debrided:  Subcutaneous    Devitalized tissue debrided:  Callus and Fibrin    Instruments:  Nippers    Bleeding:  Minimal  Hemostasis Achieved: Yes    Method Used:  Pressure  Patient tolerance:  Patient tolerated the procedure well with no immediate complications     Cleanse wound with normal saline or wound . Dry well.apply iodosorb to both wound(s). Top with football dressing consisting 3 rolls of cast padding. Top with coban .Change 3 times a week, pt has HH.   RTC in 2 weeks or sooner if any new pedal problems should arise or if condition worsens.             .

## 2018-06-20 ENCOUNTER — PATIENT MESSAGE (OUTPATIENT)
Dept: ENDOSCOPY | Facility: HOSPITAL | Age: 63
End: 2018-06-20

## 2018-06-22 ENCOUNTER — CLINICAL SUPPORT (OUTPATIENT)
Dept: CARDIOLOGY | Facility: CLINIC | Age: 63
End: 2018-06-22
Attending: INTERNAL MEDICINE
Payer: COMMERCIAL

## 2018-06-22 ENCOUNTER — OFFICE VISIT (OUTPATIENT)
Dept: OPTOMETRY | Facility: CLINIC | Age: 63
End: 2018-06-22
Payer: COMMERCIAL

## 2018-06-22 DIAGNOSIS — H25.13 NUCLEAR SCLEROSIS, BILATERAL: ICD-10-CM

## 2018-06-22 DIAGNOSIS — E11.9 TYPE 2 DIABETES MELLITUS WITHOUT RETINOPATHY: Primary | ICD-10-CM

## 2018-06-22 DIAGNOSIS — R42 DIZZINESS: ICD-10-CM

## 2018-06-22 PROCEDURE — 93880 EXTRACRANIAL BILAT STUDY: CPT | Mod: S$GLB,,, | Performed by: INTERNAL MEDICINE

## 2018-06-22 PROCEDURE — 92004 COMPRE OPH EXAM NEW PT 1/>: CPT | Mod: S$GLB,,, | Performed by: OPTOMETRIST

## 2018-06-22 PROCEDURE — 92015 DETERMINE REFRACTIVE STATE: CPT | Mod: S$GLB,,, | Performed by: OPTOMETRIST

## 2018-06-22 PROCEDURE — 99999 PR PBB SHADOW E&M-EST. PATIENT-LVL II: CPT | Mod: PBBFAC,,, | Performed by: OPTOMETRIST

## 2018-06-22 NOTE — PROGRESS NOTES
HPI     HAILEE: 3 years agp   Pt states va fluctuates with BS. Glasses are three years  Denies f/f    Saline ou PRN     DM -- this AM  Hx Diabetic Ret.    LBS= 137 this morning   Hemoglobin A1C       Date                     Value               Ref Range             Status                04/19/2018               6.8 (H)             4.0 - 5.6 %           Final                  01/29/2018               8.1 (H)             4.0 - 5.6 %           Final                  03/17/2017               10.4 (H)            4.5 - 6.2 %           Final                Last edited by Rajiv Hurst, OD on 6/22/2018 10:41 AM. (History)        ROS     Positive for: Endocrine (DM)    Negative for: Constitutional, Gastrointestinal, Neurological, Skin,   Genitourinary, Musculoskeletal, HENT, Cardiovascular, Eyes, Respiratory,   Psychiatric, Allergic/Imm, Heme/Lymph    Last edited by Rajiv Hurst, OD on 6/22/2018 10:41 AM. (History)        Assessment /Plan     For exam results, see Encounter Report.    Type 2 diabetes mellitus without retinopathy    Nuclear sclerosis, bilateral      1. Reduced VA 2 to Cat OU--discussed surgery--pt wishes to wait.  Wishes new Rx  2. DM- WITHOUT RETINOPATHY.  Advised yearly DFE    PLAN:    rtc 1 yr

## 2018-06-25 LAB — INTERNAL CAROTID STENOSIS: ABNORMAL

## 2018-06-29 ENCOUNTER — OFFICE VISIT (OUTPATIENT)
Dept: PODIATRY | Facility: CLINIC | Age: 63
End: 2018-06-29
Payer: COMMERCIAL

## 2018-06-29 VITALS — DIASTOLIC BLOOD PRESSURE: 54 MMHG | HEIGHT: 65 IN | SYSTOLIC BLOOD PRESSURE: 92 MMHG | HEART RATE: 70 BPM

## 2018-06-29 DIAGNOSIS — L97.312 SKIN ULCER OF RIGHT ANKLE WITH FAT LAYER EXPOSED: ICD-10-CM

## 2018-06-29 DIAGNOSIS — L97.412 SKIN ULCER OF RIGHT HEEL WITH FAT LAYER EXPOSED: ICD-10-CM

## 2018-06-29 PROCEDURE — 11042 DBRDMT SUBQ TIS 1ST 20SQCM/<: CPT | Mod: S$GLB,,, | Performed by: PODIATRIST

## 2018-06-29 PROCEDURE — 99499 UNLISTED E&M SERVICE: CPT | Mod: S$GLB,,, | Performed by: PODIATRIST

## 2018-06-29 PROCEDURE — 99999 PR PBB SHADOW E&M-EST. PATIENT-LVL III: CPT | Mod: PBBFAC,,, | Performed by: PODIATRIST

## 2018-06-29 NOTE — PROGRESS NOTES
Subjective:      Patient ID: Kylie King is a 62 y.o. female.    Chief Complaint: Diabetes Mellitus (PCP Dr. Foote 6/11/18); Foot Ulcer; Follow-up; Wound Care; and Dressing Change    Kylie is a 62 y.o. female who presents to the clinic for evaluation and treatment of high risk feet. Kylie has a past medical history of Anxiety; Breast cancer (1/2013); Choledocholithiasis; Chronic obstructive pulmonary disease (6/8/2018); Colon cancer (2001); CRI (chronic renal insufficiency); ESRD (end stage renal disease) on dialysis; Former smoker (6/8/2018); GERD (gastroesophageal reflux disease); History of acute pancreatitis (2009); History of cerebrovascular accident (6/8/2018); History of colon cancer; HTN (hypertension), benign; Hyperlipidemia; Hypoxemia (6/8/2018); Intracerebral hemorrhage; Kidney stone; Obesity; Pancreatitis; Pancreatitis (2008); Stroke (12/2012); Tobacco abuse; and Type 2 diabetes mellitus with neurological manifestations, controlled. The patient's chief complaint is diabetic foot ulcer, right foot x2.       PCP: Virginia Foote MD    Date Last Seen by PCP: 3/14/2018    Current shoe gear:  Affected Foot: Football and Darco shoe on the affected foot     Unaffected Foot: Tennis shoes    History of Trauma: negative  Sign of Infection: none    Hemoglobin A1C   Date Value Ref Range Status   04/19/2018 6.8 (H) 4.0 - 5.6 % Final     Comment:     According to ADA guidelines, hemoglobin A1c <7.0% represents  optimal control in non-pregnant diabetic patients. Different  metrics may apply to specific patient populations.   Standards of Medical Care in Diabetes-2016.  For the purpose of screening for the presence of diabetes:  <5.7%     Consistent with the absence of diabetes  5.7-6.4%  Consistent with increasing risk for diabetes   (prediabetes)  >or=6.5%  Consistent with diabetes  Currently, no consensus exists for use of hemoglobin A1c  for diagnosis of diabetes for children.  This Hemoglobin A1c assay  has significant interference with fetal   hemoglobin   (HbF). The results are invalid for patients with abnormal amounts of   HbF,   including those with known Hereditary Persistence   of Fetal Hemoglobin. Heterozygous hemoglobin variants (HbAS, HbAC,   HbAD, HbAE, HbA2) do not significantly interfere with this assay;   however, presence of multiple variants in a sample may impact the %   interference.     01/29/2018 8.1 (H) 4.0 - 5.6 % Final     Comment:     According to ADA guidelines, hemoglobin A1c <7.0% represents  optimal control in non-pregnant diabetic patients. Different  metrics may apply to specific patient populations.   Standards of Medical Care in Diabetes-2016.  For the purpose of screening for the presence of diabetes:  <5.7%     Consistent with the absence of diabetes  5.7-6.4%  Consistent with increasing risk for diabetes   (prediabetes)  >or=6.5%  Consistent with diabetes  Currently, no consensus exists for use of hemoglobin A1c  for diagnosis of diabetes for children.  This Hemoglobin A1c assay has significant interference with fetal   hemoglobin   (HbF). The results are invalid for patients with abnormal amounts of   HbF,   including those with known Hereditary Persistence   of Fetal Hemoglobin. Heterozygous hemoglobin variants (HbAS, HbAC,   HbAD, HbAE, HbA2) do not significantly interfere with this assay;   however, presence of multiple variants in a sample may impact the %   interference.     03/17/2017 10.4 (H) 4.5 - 6.2 % Final     Comment:     According to ADA guidelines, hemoglobin A1C <7.0% represents  optimal control in non-pregnant diabetic patients.  Different  metrics may apply to specific populations.   Standards of Medical Care in Diabetes - 2016.  For the purpose of screening for the presence of diabetes:  <5.7%     Consistent with the absence of diabetes  5.7-6.4%  Consistent with increasing risk for diabetes   (prediabetes)  >or=6.5%  Consistent with diabetes  Currently no  consensus exists for use of hemoglobin A1C  for diagnosis of diabetes for children.         Review of Systems   Constitution: Negative for chills, fever and malaise/fatigue.   HENT: Negative for hearing loss.    Cardiovascular: Negative for claudication and leg swelling.   Respiratory: Negative for shortness of breath.    Skin: Positive for color change, dry skin, nail changes, poor wound healing and unusual hair distribution. Negative for flushing and rash.   Musculoskeletal: Positive for joint pain. Negative for myalgias.   Neurological: Positive for numbness, paresthesias and sensory change.   Psychiatric/Behavioral: Negative for altered mental status.           Objective:      Physical Exam   Constitutional: She is oriented to person, place, and time. She appears well-developed and well-nourished. She is cooperative.   Cardiovascular:   Pulses:       Dorsalis pedis pulses are 0 on the right side, and 0 on the left side.        Posterior tibial pulses are 1+ on the right side, and 1+ on the left side.   Non-pitting edema noted to b/L LEs   Musculoskeletal: She exhibits edema and tenderness.        Right knee: She exhibits no swelling and no ecchymosis.        Left knee: She exhibits no swelling and no ecchymosis.        Right ankle: She exhibits normal range of motion, no swelling, no ecchymosis and normal pulse. No lateral malleolus, no medial malleolus and no head of 5th metatarsal tenderness found. Achilles tendon exhibits no pain, no defect and normal Ortiz's test results.        Left ankle: She exhibits normal range of motion, no swelling, no ecchymosis and normal pulse. No lateral malleolus, no medial malleolus and no head of 5th metatarsal tenderness found. Achilles tendon exhibits no pain and normal Ortiz's test results.        Right lower leg: She exhibits no tenderness, no bony tenderness, no swelling, no edema and no deformity.        Left lower leg: She exhibits no tenderness, no swelling and no  edema.        Right foot: There is normal range of motion and no deformity.        Left foot: There is normal range of motion and no deformity.   Decreased ROM with out joint pain nor crepitation to bilateral feet and ankle joints.  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 0 sites sensed.   Skin Integrity: Positive for ulcer.   Left Foot:   Protective Sensation: 5 sites tested. 0 sites sensed.   Skin Integrity: Positive for dry skin.   Neurological: She is alert and oriented to person, place, and time. A sensory deficit is present.   Absent protective sensation b/L   Skin: Skin is warm and dry. Capillary refill takes more than 3 seconds. No abrasion, no bruising, no burn and no ecchymosis noted.   Ulceration  Location: right heel  Measurements: 1.5x 2.0x 0.2cm  Drainage: serous  Wound base: fibrotic/granular mixed base  SOI: none  Ulceration  Location: right dorsal ankle  Measurements: 1.9x 4.0x 0.2  Drainage: serous  Wound base: fibrotic/granular mixed base  SOI: none     Psychiatric: She has a normal mood and affect. Her speech is normal and behavior is normal. She is attentive.   Vitals reviewed.            Assessment:       Encounter Diagnoses   Name Primary?    DM type 2, uncontrolled, with neuropathy Yes    Skin ulcer of right heel with fat layer exposed     Skin ulcer of right ankle with fat layer exposed          Plan:       Kylie was seen today for diabetes mellitus, foot ulcer, follow-up, wound care and dressing change.    Diagnoses and all orders for this visit:    DM type 2, uncontrolled, with neuropathy    Skin ulcer of right heel with fat layer exposed    Skin ulcer of right ankle with fat layer exposed      I counseled the patient on her conditions, their implications and medical management.      Wound Debridement  Date/Time: 6/29/2018 1:03 PM  Performed by: SHRUTHI ALLEN  Authorized by:  SHRUTHI ALLEN     Consent Done?:  Yes (Verbal)  Local anesthesia used?: No      Wound Details:    Location:  Right foot    Location:  Right Heel       Length (cm):  1.5       Area (sq cm):  3       Width (cm):  2       Depth (cm):  0.2    Depth of debridement:  Subcutaneous tissue    Tissue debrided:  Subcutaneous    Devitalized tissue debrided:  Callus and Fibrin    Instruments:  Nippers    Additional wounds:  1    2nd Wound Details:     Location:  Right foot    Location:  Right Ankle    Location:  Right Ankle       Length (cm):  1.9       Area (sq cm):  7.6       Width (cm):  4       Depth (cm):  0.2    Depth of debridement:  Subcutaneous tissue    Tissue debrided:  Subcutaneous    Devitalized tissue debrided:  Fibrin    Instruments:  Nippers    Bleeding:  Moderate  Hemostasis Achieved: Yes    Method Used:  Pressure  Patient tolerance:  Patient tolerated the procedure well with no immediate complications     Cleanse wound with normal saline or wound . Dry well.apply aquacell to both wound(s). Top with football dressing consisting 3 rolls of cast padding. Top with coban .Change twice a week, pt has HH.   RTC in 2 weeks or sooner if any new pedal problems should arise or if condition worsens.             .

## 2018-07-06 ENCOUNTER — TELEPHONE (OUTPATIENT)
Dept: CARDIOLOGY | Facility: CLINIC | Age: 63
End: 2018-07-06

## 2018-07-06 NOTE — TELEPHONE ENCOUNTER
Please review carotid u/s done 6/22/18.     Patient's next appointment with you is 8/13/18.    Based on the results, do you need to see her sooner?    Please advise.

## 2018-07-12 ENCOUNTER — TELEPHONE (OUTPATIENT)
Dept: ADMINISTRATIVE | Facility: CLINIC | Age: 63
End: 2018-07-12

## 2018-07-12 NOTE — PROGRESS NOTES
Home health recert with [facility] - Dr. Emelyn Velásquez. Pt. Received  services.

## 2018-07-13 ENCOUNTER — OFFICE VISIT (OUTPATIENT)
Dept: VASCULAR SURGERY | Facility: CLINIC | Age: 63
End: 2018-07-13
Payer: COMMERCIAL

## 2018-07-13 ENCOUNTER — HOSPITAL ENCOUNTER (OUTPATIENT)
Dept: VASCULAR SURGERY | Facility: CLINIC | Age: 63
Discharge: HOME | End: 2018-07-13
Attending: SURGERY
Payer: COMMERCIAL

## 2018-07-13 ENCOUNTER — OFFICE VISIT (OUTPATIENT)
Dept: PODIATRY | Facility: CLINIC | Age: 63
End: 2018-07-13
Payer: COMMERCIAL

## 2018-07-13 VITALS
WEIGHT: 263 LBS | DIASTOLIC BLOOD PRESSURE: 56 MMHG | HEIGHT: 65 IN | HEART RATE: 72 BPM | BODY MASS INDEX: 43.82 KG/M2 | SYSTOLIC BLOOD PRESSURE: 98 MMHG

## 2018-07-13 VITALS
DIASTOLIC BLOOD PRESSURE: 83 MMHG | HEART RATE: 72 BPM | TEMPERATURE: 98 F | BODY MASS INDEX: 44.73 KG/M2 | SYSTOLIC BLOOD PRESSURE: 129 MMHG | WEIGHT: 262 LBS | HEIGHT: 64 IN

## 2018-07-13 DIAGNOSIS — N18.6 ESRD (END STAGE RENAL DISEASE) ON DIALYSIS: ICD-10-CM

## 2018-07-13 DIAGNOSIS — E11.42 DIABETIC PERIPHERAL NEUROPATHY ASSOCIATED WITH TYPE 2 DIABETES MELLITUS: Primary | ICD-10-CM

## 2018-07-13 DIAGNOSIS — T82.590A MALFUNCTION OF ARTERIOVENOUS DIALYSIS FISTULA, INITIAL ENCOUNTER: Primary | ICD-10-CM

## 2018-07-13 DIAGNOSIS — Z99.2 ESRD ON DIALYSIS: ICD-10-CM

## 2018-07-13 DIAGNOSIS — L97.412 SKIN ULCER OF RIGHT HEEL WITH FAT LAYER EXPOSED: ICD-10-CM

## 2018-07-13 DIAGNOSIS — T82.590A DIALYSIS AV FISTULA MALFUNCTION: ICD-10-CM

## 2018-07-13 DIAGNOSIS — L97.311 SKIN ULCER OF RIGHT ANKLE, LIMITED TO BREAKDOWN OF SKIN: ICD-10-CM

## 2018-07-13 DIAGNOSIS — Z99.2 ESRD (END STAGE RENAL DISEASE) ON DIALYSIS: ICD-10-CM

## 2018-07-13 DIAGNOSIS — N18.6 ESRD ON DIALYSIS: ICD-10-CM

## 2018-07-13 PROCEDURE — 11042 DBRDMT SUBQ TIS 1ST 20SQCM/<: CPT | Mod: S$GLB,,, | Performed by: PODIATRIST

## 2018-07-13 PROCEDURE — 99999 PR PBB SHADOW E&M-EST. PATIENT-LVL III: CPT | Mod: PBBFAC,,, | Performed by: PODIATRIST

## 2018-07-13 PROCEDURE — 99499 UNLISTED E&M SERVICE: CPT | Mod: S$GLB,,, | Performed by: PODIATRIST

## 2018-07-13 PROCEDURE — 99999 PR PBB SHADOW E&M-EST. PATIENT-LVL III: CPT | Mod: PBBFAC,,, | Performed by: SURGERY

## 2018-07-13 PROCEDURE — 99024 POSTOP FOLLOW-UP VISIT: CPT | Mod: S$GLB,,, | Performed by: SURGERY

## 2018-07-13 PROCEDURE — 93990 DOPPLER FLOW TESTING: CPT | Mod: S$GLB,,, | Performed by: SURGERY

## 2018-07-13 RX ORDER — MUPIROCIN 20 MG/G
OINTMENT TOPICAL
Status: CANCELLED | OUTPATIENT
Start: 2018-07-13

## 2018-07-13 RX ORDER — LIDOCAINE HYDROCHLORIDE 10 MG/ML
1 INJECTION, SOLUTION EPIDURAL; INFILTRATION; INTRACAUDAL; PERINEURAL ONCE
Status: CANCELLED | OUTPATIENT
Start: 2018-07-13 | End: 2018-07-13

## 2018-07-13 NOTE — PROGRESS NOTES
1HISTORY OF PRESENT ILLNESS:  A 62-year-old female with new end-stage renal   disease, dialyzing via right IJ PermCath for the last two to three weeks.  She   is here for consideration of permanent dialysis access.  She is right-handed.    She has no history of AICD or pacemaker placement.    It sounds as though her renal failure came on rather suddenly and was   hospitalized for some time.  She is still using supplemental oxygen, but she is   feeling better and can now lie flat.    Now s/p R lincoln AVF 5/28/18.  No c/o    PAST MEDICAL HISTORY:  1.  History of breast cancer.  2.  History of choledocholithiasis.  3.  Hypertension.  4.  Hyperlipidemia.  5.  History of intracerebral hemorrhage.  6.  History of pancreatitis.  7.  History of stroke.  8.  Type 2 diabetes.    PAST SURGICAL HISTORY:  1.  Sigmoidectomy.  2.  Cholecystectomy.  3.  Nephrectomy.  4.  Breast biopsies.    FAMILY HISTORY:  Positive for diabetes and heart disease.    SOCIAL HISTORY:  Current smoker.    MEDICATIONS:  No anticoagulants or antiplatelet agents.  See EPIC for full list.    ALLERGIES:  KEFLEX, ERYTHROMYCIN AND CYCLOBENZAPRINE.    REVIEW OF SYSTEMS:  Denies postprandial pain or DVT.  All other systems   including eyes, ENT, respiratory, musculoskeletal, skin, psychiatric, heme,   lymph, allergy and immune are negative.    PHYSICAL EXAMINATION:  VITAL SIGNS:  See nursing note.  GENERAL:  She appears somewhat chronically ill using supplemental oxygen.    Resume normal effort.  Clear to auscultation.  CARDIAC:  Regular rate and rhythm.  Nondisplaced PMI, no murmur.  VASCULAR:  2+ radial and brachial pulses.  EXTREMITIES:  Her arms are somewhat generous in girth.  R arm shows healing lincoln incision (Scab present), soft thrill;  Cannot feel it well past prox 1/3 of arm    IMAGING:    No stenosis, flow 905 ml/min  Vivian 6.8, 6.3, 6.1  Depth 41, prox, 5.8 mid    ASSESSMENT:  Reasonable early maturation.   Likely too deep for cannulation.   Want  to make sure there is a dominant cephalic vein without stenosis prior to transposition    PLAN:  1.  R fistulagram 7/18/18  2. Cath lab    I have explained the risks, benefits and alternatives for this procedure in detail.  The patients voices understanding and all questions have be answered, and agrees to proceed with the procedure.    JIM Pollard III, MD, FACS  Professor and Chief, Vascular and Endovascular Surgery

## 2018-07-17 NOTE — PROGRESS NOTES
Subjective:      Patient ID: Kylie King is a 62 y.o. female.    Chief Complaint: Diabetes Mellitus (dr foote/06/11/2018) and Diabetic Foot Exam    Kylie is a 62 y.o. female who presents to the clinic for evaluation and treatment of high risk feet. Kylie has a past medical history of Anxiety; Breast cancer (1/2013); Choledocholithiasis; Chronic obstructive pulmonary disease (6/8/2018); Colon cancer (2001); CRI (chronic renal insufficiency); ESRD (end stage renal disease) on dialysis; Former smoker (6/8/2018); GERD (gastroesophageal reflux disease); History of acute pancreatitis (2009); History of cerebrovascular accident (6/8/2018); History of colon cancer; HTN (hypertension), benign; Hyperlipidemia; Hypoxemia (6/8/2018); Intracerebral hemorrhage; Kidney stone; Obesity; Pancreatitis; Pancreatitis (2008); Stroke (12/2012); Tobacco abuse; and Type 2 diabetes mellitus with neurological manifestations, controlled. The patient's chief complaint is diabetic foot ulcer, right foot x2.       PCP: Virginia Foote MD    Date Last Seen by PCP: 3/14/2018    Current shoe gear:  Affected Foot: Football and Darco shoe on the affected foot     Unaffected Foot: Tennis shoes    History of Trauma: negative  Sign of Infection: none    Hemoglobin A1C   Date Value Ref Range Status   04/19/2018 6.8 (H) 4.0 - 5.6 % Final     Comment:     According to ADA guidelines, hemoglobin A1c <7.0% represents  optimal control in non-pregnant diabetic patients. Different  metrics may apply to specific patient populations.   Standards of Medical Care in Diabetes-2016.  For the purpose of screening for the presence of diabetes:  <5.7%     Consistent with the absence of diabetes  5.7-6.4%  Consistent with increasing risk for diabetes   (prediabetes)  >or=6.5%  Consistent with diabetes  Currently, no consensus exists for use of hemoglobin A1c  for diagnosis of diabetes for children.  This Hemoglobin A1c assay has significant interference with  fetal   hemoglobin   (HbF). The results are invalid for patients with abnormal amounts of   HbF,   including those with known Hereditary Persistence   of Fetal Hemoglobin. Heterozygous hemoglobin variants (HbAS, HbAC,   HbAD, HbAE, HbA2) do not significantly interfere with this assay;   however, presence of multiple variants in a sample may impact the %   interference.     01/29/2018 8.1 (H) 4.0 - 5.6 % Final     Comment:     According to ADA guidelines, hemoglobin A1c <7.0% represents  optimal control in non-pregnant diabetic patients. Different  metrics may apply to specific patient populations.   Standards of Medical Care in Diabetes-2016.  For the purpose of screening for the presence of diabetes:  <5.7%     Consistent with the absence of diabetes  5.7-6.4%  Consistent with increasing risk for diabetes   (prediabetes)  >or=6.5%  Consistent with diabetes  Currently, no consensus exists for use of hemoglobin A1c  for diagnosis of diabetes for children.  This Hemoglobin A1c assay has significant interference with fetal   hemoglobin   (HbF). The results are invalid for patients with abnormal amounts of   HbF,   including those with known Hereditary Persistence   of Fetal Hemoglobin. Heterozygous hemoglobin variants (HbAS, HbAC,   HbAD, HbAE, HbA2) do not significantly interfere with this assay;   however, presence of multiple variants in a sample may impact the %   interference.     03/17/2017 10.4 (H) 4.5 - 6.2 % Final     Comment:     According to ADA guidelines, hemoglobin A1C <7.0% represents  optimal control in non-pregnant diabetic patients.  Different  metrics may apply to specific populations.   Standards of Medical Care in Diabetes - 2016.  For the purpose of screening for the presence of diabetes:  <5.7%     Consistent with the absence of diabetes  5.7-6.4%  Consistent with increasing risk for diabetes   (prediabetes)  >or=6.5%  Consistent with diabetes  Currently no consensus exists for use of hemoglobin  A1C  for diagnosis of diabetes for children.         Review of Systems   Constitution: Negative for chills, fever and malaise/fatigue.   HENT: Negative for hearing loss.    Cardiovascular: Negative for claudication and leg swelling.   Respiratory: Negative for shortness of breath.    Skin: Positive for color change, dry skin, nail changes, poor wound healing and unusual hair distribution. Negative for flushing and rash.   Musculoskeletal: Positive for joint pain. Negative for myalgias.   Neurological: Positive for numbness, paresthesias and sensory change.   Psychiatric/Behavioral: Negative for altered mental status.           Objective:      Physical Exam   Constitutional: She is oriented to person, place, and time. She appears well-developed and well-nourished. She is cooperative.   Cardiovascular:   Pulses:       Dorsalis pedis pulses are 0 on the right side, and 0 on the left side.        Posterior tibial pulses are 1+ on the right side, and 1+ on the left side.   Non-pitting edema noted to b/L LEs   Musculoskeletal: She exhibits edema and tenderness.        Right knee: She exhibits no swelling and no ecchymosis.        Left knee: She exhibits no swelling and no ecchymosis.        Right ankle: She exhibits normal range of motion, no swelling, no ecchymosis and normal pulse. No lateral malleolus, no medial malleolus and no head of 5th metatarsal tenderness found. Achilles tendon exhibits no pain, no defect and normal Ortiz's test results.        Left ankle: She exhibits normal range of motion, no swelling, no ecchymosis and normal pulse. No lateral malleolus, no medial malleolus and no head of 5th metatarsal tenderness found. Achilles tendon exhibits no pain and normal Ortiz's test results.        Right lower leg: She exhibits no tenderness, no bony tenderness, no swelling, no edema and no deformity.        Left lower leg: She exhibits no tenderness, no swelling and no edema.        Right foot: There is  normal range of motion and no deformity.        Left foot: There is normal range of motion and no deformity.   Decreased ROM with out joint pain nor crepitation to bilateral feet and ankle joints.  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 0 sites sensed.   Skin Integrity: Positive for ulcer.   Left Foot:   Protective Sensation: 5 sites tested. 0 sites sensed.   Skin Integrity: Positive for dry skin.   Neurological: She is alert and oriented to person, place, and time. A sensory deficit is present.   Absent protective sensation b/L   Skin: Skin is warm and dry. Capillary refill takes more than 3 seconds. No abrasion, no bruising, no burn and no ecchymosis noted.   Ulceration  Location: right heel  Measurements: 2.3x 2.6x 0.2cm  Drainage: serous  Wound base: fibrotic/granular mixed base  SOI: none  Ulceration  Location: right dorsal ankle  Measurements: 0.9x 2.0x 0.1  Drainage: serous  Wound base: fibrotic/granular mixed base  SOI: none     Psychiatric: She has a normal mood and affect. Her speech is normal and behavior is normal. She is attentive.   Vitals reviewed.            Assessment:       Encounter Diagnoses   Name Primary?    Diabetic peripheral neuropathy associated with type 2 diabetes mellitus Yes    Skin ulcer of right ankle, limited to breakdown of skin     Skin ulcer of right heel with fat layer exposed          Plan:       Kylie was seen today for diabetes mellitus and diabetic foot exam.    Diagnoses and all orders for this visit:    Diabetic peripheral neuropathy associated with type 2 diabetes mellitus    Skin ulcer of right ankle, limited to breakdown of skin    Skin ulcer of right heel with fat layer exposed      I counseled the patient on her conditions, their implications and medical management.    Wounds debrided down through subcutaneus levels (right heel) and the dermis/epidermis levels  (right ankle)  using a  Tissue nipper , until healthy bleeding tissue reached. Pt tolerated procedure well.   Football dressing applied to pts right foot by July Garcia MA under my direct supervision. Pt tolerated dressing well.   RTC in 3 weeks or sooner if any new pedal problems should arise or if condition worsens.         Procedures    .

## 2018-07-18 ENCOUNTER — TELEPHONE (OUTPATIENT)
Dept: VASCULAR SURGERY | Facility: CLINIC | Age: 63
End: 2018-07-18

## 2018-07-18 ENCOUNTER — HOSPITAL ENCOUNTER (OUTPATIENT)
Facility: HOSPITAL | Age: 63
Discharge: HOME | End: 2018-07-18
Attending: SURGERY | Admitting: SURGERY
Payer: COMMERCIAL

## 2018-07-18 VITALS
BODY MASS INDEX: 44.69 KG/M2 | SYSTOLIC BLOOD PRESSURE: 136 MMHG | DIASTOLIC BLOOD PRESSURE: 61 MMHG | HEART RATE: 77 BPM | RESPIRATION RATE: 18 BRPM | TEMPERATURE: 98 F | HEIGHT: 64 IN | OXYGEN SATURATION: 95 % | WEIGHT: 261.81 LBS

## 2018-07-18 DIAGNOSIS — T82.590A DIALYSIS AV FISTULA MALFUNCTION: ICD-10-CM

## 2018-07-18 LAB — POCT GLUCOSE: 153 MG/DL (ref 70–110)

## 2018-07-18 PROCEDURE — 99152 MOD SED SAME PHYS/QHP 5/>YRS: CPT

## 2018-07-18 PROCEDURE — 25000003 PHARM REV CODE 250: Performed by: SURGERY

## 2018-07-18 PROCEDURE — 99152 MOD SED SAME PHYS/QHP 5/>YRS: CPT | Mod: ,,, | Performed by: SURGERY

## 2018-07-18 PROCEDURE — 25000003 PHARM REV CODE 250

## 2018-07-18 PROCEDURE — 36901 INTRO CATH DIALYSIS CIRCUIT: CPT | Mod: 78,,, | Performed by: SURGERY

## 2018-07-18 PROCEDURE — 63600175 PHARM REV CODE 636 W HCPCS

## 2018-07-18 PROCEDURE — 82962 GLUCOSE BLOOD TEST: CPT

## 2018-07-18 RX ORDER — LIDOCAINE HYDROCHLORIDE 10 MG/ML
1 INJECTION, SOLUTION EPIDURAL; INFILTRATION; INTRACAUDAL; PERINEURAL ONCE
Status: DISCONTINUED | OUTPATIENT
Start: 2018-07-18 | End: 2018-07-18 | Stop reason: HOSPADM

## 2018-07-18 RX ORDER — MUPIROCIN 20 MG/G
OINTMENT TOPICAL
Status: DISCONTINUED | OUTPATIENT
Start: 2018-07-18 | End: 2018-07-18 | Stop reason: HOSPADM

## 2018-07-18 RX ORDER — CEPHALEXIN 500 MG/1
500 CAPSULE ORAL EVERY 12 HOURS
Qty: 14 CAPSULE | Refills: 0 | Status: SHIPPED | OUTPATIENT
Start: 2018-07-18 | End: 2018-07-25

## 2018-07-18 RX ORDER — CEPHALEXIN 500 MG/1
500 CAPSULE ORAL EVERY 12 HOURS
Status: DISCONTINUED | OUTPATIENT
Start: 2018-07-18 | End: 2018-07-18 | Stop reason: HOSPADM

## 2018-07-18 RX ORDER — HYDROCODONE BITARTRATE AND ACETAMINOPHEN 5; 325 MG/1; MG/1
1 TABLET ORAL EVERY 4 HOURS PRN
Status: DISCONTINUED | OUTPATIENT
Start: 2018-07-18 | End: 2018-07-18 | Stop reason: HOSPADM

## 2018-07-18 RX ORDER — CEPHALEXIN 500 MG/1
500 CAPSULE ORAL EVERY 12 HOURS
Qty: 14 CAPSULE | Refills: 0 | Status: SHIPPED | OUTPATIENT
Start: 2018-07-18 | End: 2018-07-18

## 2018-07-18 RX ADMIN — CEPHALEXIN 500 MG: 500 CAPSULE ORAL at 09:07

## 2018-07-18 NOTE — NURSING
Discharge instructions and medlist given.  Patient and spouse verbalized understanding.  Denies need for escort off unit.  Off unit via wheelchair with spouse pushing.  Awaiting call back from intern to correct keflex prescription.

## 2018-07-18 NOTE — BRIEF OP NOTE
[facility]  Brief Operative Note     SUMMARY     Surgery Date: 7/18/2018     Surgeon(s) and Role:     * RICK Pollard III, MD - Primary    Assisting Surgeon: NERY jain    Pre-op Diagnosis:  Malfunction of arteriovenous dialysis fistula, initial encounter [T82.590A]    Post-op Diagnosis:   same    Procedure(s) (LRB):  Fistulogram (Right)   Conscious Sedation    Anesthesia: RN IV Sedation    Description of the findings of the procedure: no stenosis    Findings/Key Components: as above    Estimated Blood Loss: <2cc         Specimens:   Specimen (12h ago through future)    None          Discharge Note    SUMMARY     Admit Date: 7/18/2018    Discharge Date and Time: 7/18/18    Hospital Course (synopsis of major diagnoses, care, treatment, and services provided during the course of the hospital stay): successful outpatient procedure     Final Diagnosis: ESRD    Disposition: home    Follow Up/Patient Instructions: Diet: renal  Act: ad nehemiah  FU: 1 week, no studies    Medications: pre-op

## 2018-07-18 NOTE — OP NOTE
[facility]  Vascular Surgery  Operative Note    SUMMARY     Date of Procedure: 7/18/2018     Procedure:  Right upper extremity fistulogram    Conscious sedation, 30 min    Surgeon(s) and Role:     * RICK Pollard III, MD - Primary        Vanda Fournier DO - Fellow    Assisting Surgeon: None    Pre-Operative Diagnosis: Malfunction of arteriovenous dialysis fistula, initial encounter [T82.590A]    Post-Operative Diagnosis: same    Anesthesia: RN IV Sedation      Description of the Findings of the Procedure: no stenosis of AVF, no central stenosis.        Complications: No    Estimated Blood Loss (EBL):   < 3 mL           Implants: * No implants in log *    Specimens:   Specimen (12h ago through future)    None                  Condition: Good    Disposition: PACU - hemodynamically stable.     Operation in detail: After an informed consent was obtained the patient was taken to the cath suite and placed in the supine position.  The right  arm was prepped and draped in the standard surgical fashion. The proximal AVF was accessed with micropuncture needle and wire followed by micropuncture sheath.  Fistulogram was performed demonstrating no stenosis of distal AVF, reflux fistulogram with no stenosis.   Sheath was removed with pressure held for 10 min with  good hemostasis noted.  Sterile dressing was applied and patient was taken to PACU in stable condition.    Attending staff continuously monitored the cardio-respiratory systems throughout the procedure.  See nursing notes for dosing of fentanyl and versed.  30 minutes of conscious sedation.

## 2018-07-18 NOTE — TELEPHONE ENCOUNTER
Contacted patient to schedule FU appt per Dr. Pollard's op note. Appt scheduled, pt verified. Appt letter placed in mail. ----- Message from Judith Burgess sent at 7/18/2018 12:01 PM CDT -----  Contact: Pt   Pt would like to schedule her surgery      Pt contact number [phone]  Thanks

## 2018-07-18 NOTE — NURSING
Notified Dr Fournier of the keflex sent to the wrong pharmacy.  She is scrubbed in a procedure and will make the changes after.

## 2018-07-25 ENCOUNTER — TELEPHONE (OUTPATIENT)
Dept: VASCULAR SURGERY | Facility: CLINIC | Age: 63
End: 2018-07-25

## 2018-07-25 NOTE — TELEPHONE ENCOUNTER
Attempted to contact patient with rescheduled appt for Dr. Pollard. Will reattempt. Appt letter placed in mail.

## 2018-07-31 ENCOUNTER — TELEPHONE (OUTPATIENT)
Dept: OPTOMETRY | Facility: CLINIC | Age: 63
End: 2018-07-31

## 2018-07-31 NOTE — TELEPHONE ENCOUNTER
----- Message from Amie Sandoval sent at 7/31/2018  2:31 PM CDT -----  Contact: self  [phone]  Pt is calling to speak with the doctor about her problems with her vision. Please give pt a a call back.        Thank you!

## 2018-07-31 NOTE — TELEPHONE ENCOUNTER
----- Message from Roya Kuo sent at 7/31/2018  2:52 PM CDT -----  Contact: Kylie King   Pt would like to speak with  nurse please,pt can be reached at [phone] please thank you.

## 2018-08-03 ENCOUNTER — OFFICE VISIT (OUTPATIENT)
Dept: PODIATRY | Facility: CLINIC | Age: 63
End: 2018-08-03
Payer: COMMERCIAL

## 2018-08-03 ENCOUNTER — OFFICE VISIT (OUTPATIENT)
Dept: VASCULAR SURGERY | Facility: CLINIC | Age: 63
End: 2018-08-03
Payer: COMMERCIAL

## 2018-08-03 ENCOUNTER — PATIENT MESSAGE (OUTPATIENT)
Dept: INTERNAL MEDICINE | Facility: CLINIC | Age: 63
End: 2018-08-03

## 2018-08-03 VITALS
DIASTOLIC BLOOD PRESSURE: 78 MMHG | TEMPERATURE: 98 F | HEIGHT: 64 IN | WEIGHT: 262 LBS | HEART RATE: 68 BPM | BODY MASS INDEX: 44.73 KG/M2 | SYSTOLIC BLOOD PRESSURE: 112 MMHG

## 2018-08-03 VITALS
HEIGHT: 65 IN | DIASTOLIC BLOOD PRESSURE: 53 MMHG | HEART RATE: 71 BPM | BODY MASS INDEX: 43.65 KG/M2 | WEIGHT: 262 LBS | SYSTOLIC BLOOD PRESSURE: 92 MMHG

## 2018-08-03 DIAGNOSIS — B35.1 ONYCHOMYCOSIS DUE TO DERMATOPHYTE: Primary | ICD-10-CM

## 2018-08-03 DIAGNOSIS — Z01.818 PRE-OP EVALUATION: Primary | ICD-10-CM

## 2018-08-03 DIAGNOSIS — Z99.2 ESRD (END STAGE RENAL DISEASE) ON DIALYSIS: Primary | ICD-10-CM

## 2018-08-03 DIAGNOSIS — T82.590D MALFUNCTION OF ARTERIOVENOUS DIALYSIS FISTULA, SUBSEQUENT ENCOUNTER: Primary | ICD-10-CM

## 2018-08-03 DIAGNOSIS — L97.521 SKIN ULCER OF TOE OF LEFT FOOT, LIMITED TO BREAKDOWN OF SKIN: ICD-10-CM

## 2018-08-03 DIAGNOSIS — E11.42 DIABETIC PERIPHERAL NEUROPATHY ASSOCIATED WITH TYPE 2 DIABETES MELLITUS: ICD-10-CM

## 2018-08-03 DIAGNOSIS — N18.6 ESRD (END STAGE RENAL DISEASE) ON DIALYSIS: Primary | ICD-10-CM

## 2018-08-03 PROCEDURE — 99213 OFFICE O/P EST LOW 20 MIN: CPT | Mod: S$GLB,,, | Performed by: PODIATRIST

## 2018-08-03 PROCEDURE — 99999 PR PBB SHADOW E&M-EST. PATIENT-LVL II: CPT | Mod: PBBFAC,,, | Performed by: PODIATRIST

## 2018-08-03 PROCEDURE — 11721 DEBRIDE NAIL 6 OR MORE: CPT | Mod: 59,Q7,S$GLB, | Performed by: PODIATRIST

## 2018-08-03 PROCEDURE — 99024 POSTOP FOLLOW-UP VISIT: CPT | Mod: S$GLB,,, | Performed by: SURGERY

## 2018-08-03 PROCEDURE — 97597 DBRDMT OPN WND 1ST 20 CM/<: CPT | Mod: S$GLB,,, | Performed by: PODIATRIST

## 2018-08-03 PROCEDURE — 99999 PR PBB SHADOW E&M-EST. PATIENT-LVL III: CPT | Mod: PBBFAC,,, | Performed by: SURGERY

## 2018-08-03 NOTE — PROGRESS NOTES
1HISTORY OF PRESENT ILLNESS:  A 62-year-old female with new end-stage renal   disease, dialyzing via right IJ PermCath .  She   is here for consideration of permanent dialysis access.  She is right-handed.    She has no history of AICD or pacemaker placement.    It sounds as though her renal failure came on rather suddenly and was   hospitalized for some time.  She is still using supplemental oxygen, but she is   feeling better and can now lie flat.    Now     1. Diagnostic fistulagram 7/18/18  2. R lincoln AVF 5/28/18.     PAST MEDICAL HISTORY:  1.  History of breast cancer.  2.  History of choledocholithiasis.  3.  Hypertension.  4.  Hyperlipidemia.  5.  History of intracerebral hemorrhage.  6.  History of pancreatitis.  7.  History of stroke.  8.  Type 2 diabetes.  9. ESRD dialyzing Tue/Thur/Sat    PAST SURGICAL HISTORY:  1.  Sigmoidectomy.  2.  Cholecystectomy.  3.  Nephrectomy.  4.  Breast biopsies.    FAMILY HISTORY:  Positive for diabetes and heart disease.    SOCIAL HISTORY:  Current smoker.    MEDICATIONS:  No anticoagulants or antiplatelet agents.  See EPIC for full list.    ALLERGIES:  KEFLEX, ERYTHROMYCIN AND CYCLOBENZAPRINE.    REVIEW OF SYSTEMS:  Denies postprandial pain or DVT.  All other systems   including eyes, ENT, respiratory, musculoskeletal, skin, psychiatric, heme,   lymph, allergy and immune are negative.    PHYSICAL EXAMINATION:  VITAL SIGNS:  See nursing note.  GENERAL:  She appears somewhat chronically ill using supplemental oxygen.    Resume normal effort.  Clear to auscultation.  CARDIAC:  Regular rate and rhythm.  Nondisplaced PMI, no murmur.  VASCULAR:  2+ radial and brachial pulses.  EXTREMITIES:  Her arms are somewhat generous in girth.  R arm shows healing lincoln incision (Scab present, almost healed now), soft thrill;  Cannot feel it well past prox 1/3 of arm    IMAGING:  No new imaging.  Prior  No stenosis, flow 905 ml/min  Vivian 6.8, 6.3, 6.1  Depth 41, prox, 5.8 mid    Her recent  fistulagram was personally reviewed.   Good dominant forearm cephalic vein, no stenosis    ASSESSMENT:    R Liv AVF, too deep to cannulate    PLAN:  1. Transposition, L Liv AVF 8/15/18  2. Regional block    I have explained the risks, benefits and alternatives for this procedure in detail.  The patients voices understanding and all questions have be answered, and agrees to proceed with the procedure.    JIM Pollard III, MD, FACS  Professor and Chief, Vascular and Endovascular Surgery

## 2018-08-06 NOTE — TELEPHONE ENCOUNTER
Pt is having surgery; needs to know which meds to take and which to d/c prior to. Please advise. Thank you.

## 2018-08-07 ENCOUNTER — TELEPHONE (OUTPATIENT)
Dept: INTERNAL MEDICINE | Facility: CLINIC | Age: 63
End: 2018-08-07

## 2018-08-07 NOTE — PROGRESS NOTES
Subjective:      Patient ID: Kylie King is a 62 y.o. female.    Chief Complaint: Diabetes Mellitus ([place] foote06/11/2018) and Diabetic Foot Exam    Kylie is a 62 y.o. female who presents to the clinic for evaluation and treatment of high risk feet. Kylie has a past medical history of Anxiety; Breast cancer (1/2013); Choledocholithiasis; Chronic obstructive pulmonary disease (6/8/2018); Colon cancer (2001); CRI (chronic renal insufficiency); ESRD (end stage renal disease) on dialysis; Former smoker (6/8/2018); GERD (gastroesophageal reflux disease); History of acute pancreatitis (2009); History of cerebrovascular accident (6/8/2018); History of colon cancer; HTN (hypertension), benign; Hyperlipidemia; Hypoxemia (6/8/2018); Intracerebral hemorrhage; Kidney stone; Obesity; Pancreatitis; Pancreatitis (2008); Stroke (12/2012); Tobacco abuse; and Type 2 diabetes mellitus with neurological manifestations, controlled. The patient's chief complaint is diabetic foot ulcer, right toe, and elongated toenails       PCP: [place] Foote MD    Date Last Seen by PCP: [place] foote06/11/2018    Current shoe gear:  Affected Foot: Football and Darco shoe on the affected foot     Unaffected Foot: Tennis shoes    History of Trauma: negative  Sign of Infection: none    Hemoglobin A1C   Date Value Ref Range Status   04/19/2018 6.8 (H) 4.0 - 5.6 % Final     Comment:     According to ADA guidelines, hemoglobin A1c <7.0% represents  optimal control in non-pregnant diabetic patients. Different  metrics may apply to specific patient populations.   Standards of Medical Care in Diabetes-2016.  For the purpose of screening for the presence of diabetes:  <5.7%     Consistent with the absence of diabetes  5.7-6.4%  Consistent with increasing risk for diabetes   (prediabetes)  >or=6.5%  Consistent with diabetes  Currently, no consensus exists for use of hemoglobin A1c  for diagnosis of diabetes for children.  This Hemoglobin A1c assay  has significant interference with fetal   hemoglobin   (HbF). The results are invalid for patients with abnormal amounts of   HbF,   including those with known Hereditary Persistence   of Fetal Hemoglobin. Heterozygous hemoglobin variants (HbAS, HbAC,   HbAD, HbAE, HbA2) do not significantly interfere with this assay;   however, presence of multiple variants in a sample may impact the %   interference.     01/29/2018 8.1 (H) 4.0 - 5.6 % Final     Comment:     According to ADA guidelines, hemoglobin A1c <7.0% represents  optimal control in non-pregnant diabetic patients. Different  metrics may apply to specific patient populations.   Standards of Medical Care in Diabetes-2016.  For the purpose of screening for the presence of diabetes:  <5.7%     Consistent with the absence of diabetes  5.7-6.4%  Consistent with increasing risk for diabetes   (prediabetes)  >or=6.5%  Consistent with diabetes  Currently, no consensus exists for use of hemoglobin A1c  for diagnosis of diabetes for children.  This Hemoglobin A1c assay has significant interference with fetal   hemoglobin   (HbF). The results are invalid for patients with abnormal amounts of   HbF,   including those with known Hereditary Persistence   of Fetal Hemoglobin. Heterozygous hemoglobin variants (HbAS, HbAC,   HbAD, HbAE, HbA2) do not significantly interfere with this assay;   however, presence of multiple variants in a sample may impact the %   interference.     03/17/2017 10.4 (H) 4.5 - 6.2 % Final     Comment:     According to ADA guidelines, hemoglobin A1C <7.0% represents  optimal control in non-pregnant diabetic patients.  Different  metrics may apply to specific populations.   Standards of Medical Care in Diabetes - 2016.  For the purpose of screening for the presence of diabetes:  <5.7%     Consistent with the absence of diabetes  5.7-6.4%  Consistent with increasing risk for diabetes   (prediabetes)  >or=6.5%  Consistent with diabetes  Currently no  consensus exists for use of hemoglobin A1C  for diagnosis of diabetes for children.         Review of Systems   Constitution: Negative for chills, fever and malaise/fatigue.   HENT: Negative for hearing loss.    Cardiovascular: Negative for claudication and leg swelling.   Respiratory: Negative for shortness of breath.    Skin: Positive for color change, dry skin, nail changes, poor wound healing and unusual hair distribution. Negative for flushing and rash.   Musculoskeletal: Positive for joint pain. Negative for myalgias.   Neurological: Positive for numbness, paresthesias and sensory change.   Psychiatric/Behavioral: Negative for altered mental status.           Objective:      Physical Exam   Constitutional: She is oriented to person, place, and time. She appears well-developed and well-nourished. She is cooperative.   Cardiovascular:   Pulses:       Dorsalis pedis pulses are 0 on the right side, and 0 on the left side.        Posterior tibial pulses are 1+ on the right side, and 1+ on the left side.   Non-pitting edema noted to b/L LEs   Musculoskeletal: She exhibits edema and tenderness.        Right knee: She exhibits no swelling and no ecchymosis.        Left knee: She exhibits no swelling and no ecchymosis.        Right ankle: She exhibits normal range of motion, no swelling, no ecchymosis and normal pulse. No lateral malleolus, no medial malleolus and no head of 5th metatarsal tenderness found. Achilles tendon exhibits no pain, no defect and normal Ortiz's test results.        Left ankle: She exhibits normal range of motion, no swelling, no ecchymosis and normal pulse. No lateral malleolus, no medial malleolus and no head of 5th metatarsal tenderness found. Achilles tendon exhibits no pain and normal Ortiz's test results.        Right lower leg: She exhibits no tenderness, no bony tenderness, no swelling, no edema and no deformity.        Left lower leg: She exhibits no tenderness, no swelling and no  edema.        Right foot: There is normal range of motion and no deformity.        Left foot: There is normal range of motion and no deformity.   Decreased ROM with out joint pain nor crepitation to bilateral feet and ankle joints.  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 0 sites sensed.   Skin Integrity: Positive for ulcer.   Left Foot:   Protective Sensation: 5 sites tested. 0 sites sensed.   Skin Integrity: Positive for dry skin.   Neurological: She is alert and oriented to person, place, and time. A sensory deficit is present.   Absent protective sensation b/L   Skin: Skin is warm and dry. Capillary refill takes more than 3 seconds. No abrasion, no bruising, no burn and no ecchymosis noted.   Ulceration  Location: right heel  Measurements: healed    Ulceration  Location: right dorsal ankle  Measurements: healed    Ulceration  Location: left distal 4th digit  Measurements: 0.3x 0.3x 0.1  Drainage: serous  Wound base: granular  SOI: none    Nails x9 are elongated by  4-5mm's, thickened by 2-3 mm's, dystrophic, and are yellowish in  coloration . Xerosis Bilaterally.        Psychiatric: She has a normal mood and affect. Her speech is normal and behavior is normal. She is attentive.   Vitals reviewed.            Assessment:       Encounter Diagnoses   Name Primary?    Onychomycosis due to dermatophyte Yes    Skin ulcer of toe of left foot, limited to breakdown of skin     DM type 2, uncontrolled, with neuropathy     Diabetic peripheral neuropathy associated with type 2 diabetes mellitus          Plan:       Kylie was seen today for diabetes mellitus and diabetic foot exam.    Diagnoses and all orders for this visit:    Onychomycosis due to dermatophyte    Skin ulcer of toe of left foot, limited to breakdown of skin    DM type 2, uncontrolled, with neuropathy    Diabetic peripheral neuropathy associated with  type 2 diabetes mellitus      I counseled the patient on her conditions, their implications and medical management.  Pt advised that right ankle and heel wounds healed.   With the patient's permission, nails were aggressively reduced and debrided X 9 to their soft tissue attachment mechanically and with an electric  removing all offending nail and debris. Pt relates relief following the procedure. She will continue to monitor the areas daily, inspect her feet, wear protective shoe gear when ambulating, moisturized daily to maintain skin integrity and follow up in the office in 3 months.     Wounds debrided down through  the dermis/epidermis levels (left toe)  using a  Tissue nipper , until healthy bleeding tissue reached. Pt tolerated procedure well.   Football dressing applied to pts right foot by July Garcia MA under my direct supervision. Pt tolerated dressing well.   RTC in 2 weeks or sooner if any new pedal problems should arise or if condition worsens.         Procedures    .

## 2018-08-13 ENCOUNTER — PATIENT MESSAGE (OUTPATIENT)
Dept: INTERNAL MEDICINE | Facility: CLINIC | Age: 63
End: 2018-08-13

## 2018-08-13 ENCOUNTER — OFFICE VISIT (OUTPATIENT)
Dept: CARDIOLOGY | Facility: CLINIC | Age: 63
End: 2018-08-13
Attending: INTERNAL MEDICINE
Payer: COMMERCIAL

## 2018-08-13 VITALS
HEART RATE: 87 BPM | BODY MASS INDEX: 45.75 KG/M2 | HEIGHT: 64 IN | WEIGHT: 268 LBS | DIASTOLIC BLOOD PRESSURE: 73 MMHG | SYSTOLIC BLOOD PRESSURE: 107 MMHG

## 2018-08-13 DIAGNOSIS — I10 ESSENTIAL HYPERTENSION: ICD-10-CM

## 2018-08-13 DIAGNOSIS — N18.6 END STAGE KIDNEY DISEASE: ICD-10-CM

## 2018-08-13 DIAGNOSIS — I50.32 HEART FAILURE, DIASTOLIC, CHRONIC: ICD-10-CM

## 2018-08-13 DIAGNOSIS — E66.01 MORBID OBESITY: ICD-10-CM

## 2018-08-13 DIAGNOSIS — Z79.4 TYPE 2 DIABETES MELLITUS WITH CHRONIC KIDNEY DISEASE ON CHRONIC DIALYSIS, WITH LONG-TERM CURRENT USE OF INSULIN: ICD-10-CM

## 2018-08-13 DIAGNOSIS — Z99.2 TYPE 2 DIABETES MELLITUS WITH CHRONIC KIDNEY DISEASE ON CHRONIC DIALYSIS, WITH LONG-TERM CURRENT USE OF INSULIN: ICD-10-CM

## 2018-08-13 DIAGNOSIS — Z85.038 HISTORY OF COLON CANCER: ICD-10-CM

## 2018-08-13 DIAGNOSIS — Z87.891 FORMER SMOKER: ICD-10-CM

## 2018-08-13 DIAGNOSIS — Z86.73 HISTORY OF CEREBROVASCULAR ACCIDENT: ICD-10-CM

## 2018-08-13 DIAGNOSIS — E11.22 TYPE 2 DIABETES MELLITUS WITH CHRONIC KIDNEY DISEASE ON CHRONIC DIALYSIS, WITH LONG-TERM CURRENT USE OF INSULIN: ICD-10-CM

## 2018-08-13 DIAGNOSIS — E78.00 HYPERCHOLESTEROLEMIA: ICD-10-CM

## 2018-08-13 DIAGNOSIS — Z86.79 HISTORY OF INTRACRANIAL HEMORRHAGE: ICD-10-CM

## 2018-08-13 DIAGNOSIS — J43.8 OTHER EMPHYSEMA: ICD-10-CM

## 2018-08-13 DIAGNOSIS — I65.23 BILATERAL CAROTID ARTERY STENOSIS: ICD-10-CM

## 2018-08-13 DIAGNOSIS — R09.02 HYPOXEMIA: ICD-10-CM

## 2018-08-13 DIAGNOSIS — Z85.3 HISTORY OF BREAST CANCER: ICD-10-CM

## 2018-08-13 DIAGNOSIS — N18.6 TYPE 2 DIABETES MELLITUS WITH CHRONIC KIDNEY DISEASE ON CHRONIC DIALYSIS, WITH LONG-TERM CURRENT USE OF INSULIN: ICD-10-CM

## 2018-08-13 PROBLEM — I65.29 CAROTID ARTERY STENOSIS: Status: ACTIVE | Noted: 2018-08-13

## 2018-08-13 PROCEDURE — 99215 OFFICE O/P EST HI 40 MIN: CPT | Mod: S$GLB,,, | Performed by: INTERNAL MEDICINE

## 2018-08-13 RX ORDER — ATORVASTATIN CALCIUM 40 MG/1
40 TABLET, FILM COATED ORAL DAILY
Qty: 90 TABLET | Refills: 3 | Status: SHIPPED | OUTPATIENT
Start: 2018-08-13 | End: 2018-10-11 | Stop reason: SDUPTHER

## 2018-08-13 NOTE — PROGRESS NOTES
Subjective:     Kylie King is a 62 y.o. female with hypertension, hypercholesterolemia and diabetes mellitus, type 2. She is morbidly obese. She is a former smoker. She has suffered a cerebrovascular accident in 12/2012 and also had an intracranial bleed at that time. She has had colectomy for colon cancer and mastectomy for breast cancer. She is on home O2 for chronic obstructive pulmonary disease with hypoxemia. She began hemodialysis in 4/2018 after she presented fluid overloaded. An Echocardiogram at that time revealed findings consistent with hypertensive heart disease and diastolic dysfunction. In 6/2018 she had a Carotid Duplex study that revealed moderate bilateral plaquing with mildly increased velocities. She denies any exertional chest pain. She denies palpitations or weak spells. Feeling fair overall.      Congestive Heart Failure   Presents for initial visit. The disease course has been improving. Associated symptoms include shortness of breath. Pertinent negatives include no abdominal pain, chest pain, chest pressure, claudication, edema, fatigue, muscle weakness, near-syncope, nocturia, orthopnea, palpitations, paroxysmal nocturnal dyspnea or unexpected weight change. The symptoms have been stable.   Cerebrovascular Accident   This is a chronic problem. The current episode started more than 1 year ago. The problem has been resolved. Pertinent negatives include no abdominal pain, anorexia, arthralgias, change in bowel habit, chest pain, chills, congestion, coughing, diaphoresis, fatigue, fever, headaches, joint swelling, myalgias, nausea, neck pain, numbness, rash, sore throat, swollen glands, urinary symptoms, vertigo, visual change, vomiting or weakness.   Hypertension   This is a chronic problem. The current episode started more than 1 year ago. The problem is unchanged. The problem is controlled (usually /50-80 mmHg at home). Associated symptoms include shortness of breath. Pertinent  negatives include no anxiety, blurred vision, chest pain, headaches, malaise/fatigue, neck pain, orthopnea, palpitations, peripheral edema, PND or sweats. Identifiable causes of hypertension include chronic renal disease.   Hyperlipidemia   This is a chronic problem. The current episode started more than 1 year ago. The problem is controlled. Exacerbating diseases include chronic renal disease, diabetes and obesity. She has no history of hypothyroidism, liver disease or nephrotic syndrome. Associated symptoms include shortness of breath. Pertinent negatives include no chest pain, focal sensory loss, focal weakness, leg pain or myalgias.   Shortness of Breath   This is a chronic problem. The current episode started more than 1 year ago. The problem occurs daily. The problem has been unchanged. Pertinent negatives include no abdominal pain, chest pain, claudication, coryza, ear pain, fever, headaches, hemoptysis, leg pain, leg swelling, neck pain, orthopnea, PND, rash, rhinorrhea, sore throat, swollen glands, syncope, vomiting or wheezing.       Review of Systems   Constitution: Negative for chills, diaphoresis, fatigue, fever, weakness, malaise/fatigue and unexpected weight change.   HENT: Negative for congestion, ear pain, nosebleeds, rhinorrhea and sore throat.    Eyes: Negative for blurred vision, double vision, vision loss in left eye and vision loss in right eye.   Cardiovascular: Positive for dyspnea on exertion. Negative for chest pain, claudication, irregular heartbeat, leg swelling, near-syncope, orthopnea, palpitations, paroxysmal nocturnal dyspnea and syncope.   Respiratory: Positive for shortness of breath. Negative for cough, hemoptysis and wheezing.    Endocrine: Negative for cold intolerance and heat intolerance.   Hematologic/Lymphatic: Negative for bleeding problem. Does not bruise/bleed easily.   Skin: Negative for color change and rash.   Musculoskeletal: Negative for arthralgias, back pain, falls,  "joint swelling, muscle weakness, myalgias and neck pain.   Gastrointestinal: Negative for abdominal pain, anorexia, change in bowel habit, heartburn, hematemesis, hematochezia, hemorrhoids, jaundice, melena, nausea and vomiting.   Genitourinary: Negative for dysuria, hematuria and nocturia.   Neurological: Positive for light-headedness. Negative for dizziness, focal weakness, headaches, loss of balance, numbness and vertigo.   Psychiatric/Behavioral: Negative for altered mental status, depression and memory loss. The patient is not nervous/anxious.    Allergic/Immunologic: Negative for hives and persistent infections.       Current Outpatient Medications on File Prior to Visit   Medication Sig Dispense Refill    acetaminophen-codeine 300-30mg (TYLENOL #3) 300-30 mg Tab TAKE ONE TABLET BY MOUTH EVERY 6 HOURS AS NEEDED 90 tablet 2    albuterol 90 mcg/actuation inhaler Inhale 2 puffs into the lungs every 6 (six) hours as needed for Wheezing. Rescue 18 g 0    amLODIPine (NORVASC) 10 MG tablet Take 1 tablet (10 mg total) by mouth every evening. 90 tablet 3    BD INSULIN PEN NEEDLE UF MINI 31 gauge x 3/16" Ndle USE 5 NEEDLES PER  each 2    blood sugar diagnostic (ONETOUCH ULTRA TEST) Strp 1 strip by Misc.(Non-Drug; Combo Route) route 4 (four) times daily before meals and nightly. 400 strip 4    bumetanide (BUMEX) 1 MG tablet Take 1 tablet (1 mg total) by mouth 2 (two) times daily. 180 tablet 3    carvedilol (COREG) 25 MG tablet Take 1 tablet (25 mg total) by mouth 2 (two) times daily. (Patient taking differently: Take 25 mg by mouth 3 (three) times daily. ) 180 tablet 4    citalopram (CELEXA) 20 MG tablet Take 1 tablet (20 mg total) by mouth nightly. 90 tablet 3    gabapentin (NEURONTIN) 400 MG capsule Take 1 capsule (400 mg total) by mouth 2 (two) times daily. 180 capsule 3    hydrALAZINE (APRESOLINE) 50 MG tablet Take 1 tablet (50 mg total) by mouth 3 (three) times daily. (Patient taking differently: " "Take 100 mg by mouth 3 (three) times daily. ) 270 tablet 3    HYDROcodone-acetaminophen (NORCO) 5-325 mg per tablet Take 1 tablet by mouth every 6 (six) hours as needed for Pain. 35 tablet 0    insulin detemir U-100 (LEVEMIR FLEXTOUCH U-100 INSULN) 100 unit/mL (3 mL) SubQ InPn pen Inject 15 Units into the skin every evening. (Patient taking differently: Inject 45 Units into the skin every evening. Take 45 unit in am and pm) 75 mL 3    insulin lispro (HUMALOG KWIKPEN INSULIN) 100 unit/mL InPn pen BLOOD GLUCOSE 150-200, INJECT 1 UNITS UNDER THE SKIN, 201-250, 2 UNITS, 251-300, 3 UNITS THREE TIMES A DAY AS DIRECTED      insulin needles, disposable, (PEN NEEDLE, DIABETIC) 31 Ndle Patient needs 5 needles a day 500 each 3    insulin needles, disposable, 32 x 5/32 " Ndle 1 Device by Misc.(Non-Drug; Combo Route) route 5 (five) times daily. 400 each 6    isosorbide dinitrate (ISORDIL) 10 MG tablet Take 1 tablet (10 mg total) by mouth 3 (three) times daily. 270 tablet 3    lancets (ONETOUCH DELICA LANCETS) 33 gauge Misc 1 lancet by Misc.(Non-Drug; Combo Route) route 4 (four) times daily before meals and nightly. 400 each 4    letrozole (FEMARA) 2.5 mg Tab Take 1 tablet (2.5 mg total) by mouth every evening. 90 tablet 3    miconazole NITRATE 2 % (MICOTIN) 2 % top powder Apply topically 2 (two) times daily. Apply to groin and skin folds  0    oxybutynin (DITROPAN XL) 15 MG TR24 Take 1 tablet (15 mg total) by mouth once daily. 90 tablet 4    pravastatin (PRAVACHOL) 40 MG tablet Take 1 tablet (40 mg total) by mouth every evening. 90 tablet 3    ranitidine (ZANTAC) 150 MG capsule Take 1 capsule (150 mg total) by mouth nightly. 90 capsule 3    sevelamer carbonate (RENVELA) 800 mg Tab Take 1 tablet (800 mg total) by mouth 3 (three) times daily with meals. 270 tablet 3     No current facility-administered medications on file prior to visit.        /73   Pulse 87   Ht 5' 4" (1.626 m)   Wt 121.6 kg (268 lb)   BMI " 46.00 kg/m²       Objective:     Physical Exam   Constitutional: She is oriented to person, place, and time. She appears well-developed and well-nourished.  Non-toxic appearance. She appears ill. No distress.   HENT:   Head: Normocephalic and atraumatic.   Nose: Nose normal.   Eyes: Right eye exhibits no discharge. Left eye exhibits no discharge. Right conjunctiva is not injected. Left conjunctiva is not injected. Right pupil is round. Left pupil is round. Pupils are equal.   Neck: Neck supple. No JVD present. Carotid bruit is not present. No thyromegaly present.   Cardiovascular: Normal rate, regular rhythm, S1 normal and S2 normal.  No extrasystoles are present. PMI is not displaced. Exam reveals gallop and S4.   Murmur heard.   Midsystolic murmur is present with a grade of 2/6 at the upper right sternal border.  Pulses:       Radial pulses are 2+ on the right side, and 2+ on the left side.        Femoral pulses are 2+ on the right side, and 2+ on the left side.  Pulmonary/Chest: Effort normal and breath sounds normal.   Sam right upper chest.   Abdominal: Soft. Normal appearance. There is no hepatosplenomegaly. There is no tenderness.   Musculoskeletal:        Right ankle: She exhibits no swelling, no ecchymosis and no deformity.        Left ankle: She exhibits no swelling, no ecchymosis and no deformity.   Lymphadenopathy:        Head (right side): No submandibular adenopathy present.        Head (left side): No submandibular adenopathy present.     She has no cervical adenopathy.   Neurological: She is alert and oriented to person, place, and time. She is not disoriented. No cranial nerve deficit.   Skin: Skin is warm, dry and intact. She is not diaphoretic. No erythema. Nails show no clubbing.   Psychiatric: She has a normal mood and affect. Her speech is normal and behavior is normal. Judgment and thought content normal. Cognition and memory are normal.       Assessment:     1. Heart failure, diastolic,  chronic    2. History of intracranial hemorrhage    3. History of cerebrovascular accident    4. Bilateral carotid artery stenosis    5. Essential hypertension    6. Hypercholesterolemia    7. Type 2 diabetes mellitus with chronic kidney disease on chronic dialysis, with long-term current use of insulin    8. Morbid obesity    9. Former smoker    10. End stage kidney disease    11. Other emphysema    12. Hypoxemia    13. History of colon cancer    14. History of breast cancer        Plan:     1. Heart Failure, Diastolic, Chronic   4/20/2018: Echo: Normal left ventricular size with low normal systolic function. EF 50-55%. Mild LVH. Moderate diastolic dysfunction.    Appears fairly  well compensated.    2. History of Intracranial Bleed   12/2012: [place] bleed.    3. History of Cerebrovascular Accident   12/2012: CVA. Sequela is mildly affected writing.    4. Carotid Artery Stenosis   6/22/2018: Carotid Duplex: SHANELL: Moderate plaquing - 2.0 m/s - <50%. LICA: Moderate plaquing - 1.6 m/s - <50%.   6/2019: Plan next Carotid Duplex. Then yearly.   Not been on aspirin due to her history of intracranial bleeding.    5. Hypertension   1999: Diagnosed.   On carvedilol 25 mg Q12, amlodipine 10 mg Q24, hydralazine 100 mg Q8 and isosorbidedinitrate 10 mg Q8.   Appears to run overall on low side to reasonably well controlled.   I would favor simplifying her regimen and try to wean off hydralazine and isosorbidedinitrate and if needed add ARB or ACEI.   8/13/2018: Stop isosorbidedinitrate.   Keep log.     6. Hypercholesterolemia   2016: Began statin.   On pravastatin 40 mg Q24.   1/29/2018: Chol 187. HDL 45. . .   Change to atorvastatin 40 mg q24.   9/2018: Lipid panel.    7. Diabetes Mellitus, Type 2   2007: Diagnosed. Complications: CVA & ESRD. Medications: Insulin.    8. Morbid Obesity   6/8/2018: Weight 119 kg. BMI 44.   Encouraged to lose weight.    9. Former Smoker   1990: Began smoking. 1/2 ppd.   2018: Quit  smoking.    10. End Stage Kidney Disease   4/2018: Began HD.   Fistula right wrist.    11. Chronic Obstructive Pulmonary Disease   4/2018: Diagnosed.   Began home O2.    12. History of Colon Cancer   2000: Partial colectomy.    13. History of Breast Cancer   2013: Left mastectomy.    14. Primary Care   Dr. [place] Foote.    F/u 6 weeks.    Jessie Salas M.D.

## 2018-08-14 ENCOUNTER — PATIENT MESSAGE (OUTPATIENT)
Dept: CARDIOLOGY | Facility: CLINIC | Age: 63
End: 2018-08-14

## 2018-08-14 ENCOUNTER — TELEPHONE (OUTPATIENT)
Dept: VASCULAR SURGERY | Facility: CLINIC | Age: 63
End: 2018-08-14

## 2018-08-14 RX ORDER — AMOXICILLIN 500 MG
1 CAPSULE ORAL EVERY MORNING
COMMUNITY

## 2018-08-14 RX ORDER — ERGOCALCIFEROL 1.25 MG/1
50000 CAPSULE ORAL
COMMUNITY
End: 2019-05-15 | Stop reason: SDUPTHER

## 2018-08-14 RX ORDER — ISOSORBIDE DINITRATE 10 MG/1
10 TABLET ORAL 2 TIMES DAILY
COMMUNITY
End: 2018-09-21 | Stop reason: ALTCHOICE

## 2018-08-14 NOTE — PRE-PROCEDURE INSTRUCTIONS
Spoke with Patient.  NPO, medication, and pre-op instructions reviewed.  Denies previous problems with Anesthesia.  Goes to Dialysis on Tuesdays, Thursdays, and Saturdays.  Stated that her morning glucose readings have been 200-250.  Stated that the lowest morning reading that she has had is 138.  Has low back pain.  Norco did not relieve her pain.  Gets some relief fro Tylenol #3.  Stated that she was told to wean off of Isosorbide Dinitrate taking 10 mg twice a day.  The medication was not on her medication list.  Dr. Aldridge's Epic note from 8-13-18 has to stop Isosorbide.  In Basket message sent to Dr. Aldridge's office.  Verbalized understanding of instructions.

## 2018-08-15 ENCOUNTER — HOSPITAL ENCOUNTER (OUTPATIENT)
Facility: HOSPITAL | Age: 63
Discharge: HOME | End: 2018-08-15
Attending: SURGERY | Admitting: SURGERY
Payer: MEDICARE

## 2018-08-15 ENCOUNTER — ANESTHESIA (OUTPATIENT)
Dept: SURGERY | Facility: HOSPITAL | Age: 63
End: 2018-08-15
Payer: MEDICARE

## 2018-08-15 ENCOUNTER — ANESTHESIA EVENT (OUTPATIENT)
Dept: SURGERY | Facility: HOSPITAL | Age: 63
End: 2018-08-15
Payer: COMMERCIAL

## 2018-08-15 VITALS
HEIGHT: 64 IN | HEART RATE: 66 BPM | RESPIRATION RATE: 16 BRPM | SYSTOLIC BLOOD PRESSURE: 112 MMHG | DIASTOLIC BLOOD PRESSURE: 59 MMHG | OXYGEN SATURATION: 92 % | BODY MASS INDEX: 44.73 KG/M2 | WEIGHT: 262 LBS | TEMPERATURE: 98 F

## 2018-08-15 DIAGNOSIS — T82.590A DIALYSIS AV FISTULA MALFUNCTION: ICD-10-CM

## 2018-08-15 DIAGNOSIS — Z99.2 ESRD (END STAGE RENAL DISEASE) ON DIALYSIS: Primary | ICD-10-CM

## 2018-08-15 DIAGNOSIS — N18.6 ESRD (END STAGE RENAL DISEASE) ON DIALYSIS: Primary | ICD-10-CM

## 2018-08-15 LAB — POCT GLUCOSE: 216 MG/DL (ref 70–110)

## 2018-08-15 PROCEDURE — 36000706: Performed by: SURGERY

## 2018-08-15 PROCEDURE — 71000015 HC POSTOP RECOV 1ST HR: Performed by: SURGERY

## 2018-08-15 PROCEDURE — 36000707: Performed by: SURGERY

## 2018-08-15 PROCEDURE — 82962 GLUCOSE BLOOD TEST: CPT | Performed by: SURGERY

## 2018-08-15 PROCEDURE — 71000044 HC DOSC ROUTINE RECOVERY FIRST HOUR: Performed by: SURGERY

## 2018-08-15 PROCEDURE — 37000009 HC ANESTHESIA EA ADD 15 MINS: Performed by: SURGERY

## 2018-08-15 PROCEDURE — 63600175 PHARM REV CODE 636 W HCPCS: Performed by: NURSE ANESTHETIST, CERTIFIED REGISTERED

## 2018-08-15 PROCEDURE — 25000003 PHARM REV CODE 250: Performed by: SURGERY

## 2018-08-15 PROCEDURE — D9220A PRA ANESTHESIA: Mod: ANES,,, | Performed by: ANESTHESIOLOGY

## 2018-08-15 PROCEDURE — S0020 INJECTION, BUPIVICAINE HYDRO: HCPCS | Performed by: ANESTHESIOLOGY

## 2018-08-15 PROCEDURE — 37000008 HC ANESTHESIA 1ST 15 MINUTES: Performed by: SURGERY

## 2018-08-15 PROCEDURE — 25000003 PHARM REV CODE 250: Performed by: NURSE ANESTHETIST, CERTIFIED REGISTERED

## 2018-08-15 PROCEDURE — D9220A PRA ANESTHESIA: Mod: CRNA,,, | Performed by: NURSE ANESTHETIST, CERTIFIED REGISTERED

## 2018-08-15 PROCEDURE — 36832 AV FISTULA REVISION OPEN: CPT | Mod: 78,GC,, | Performed by: SURGERY

## 2018-08-15 PROCEDURE — S0077 INJECTION, CLINDAMYCIN PHOSP: HCPCS | Performed by: SURGERY

## 2018-08-15 PROCEDURE — 25000003 PHARM REV CODE 250: Performed by: ANESTHESIOLOGY

## 2018-08-15 PROCEDURE — 63600175 PHARM REV CODE 636 W HCPCS: Performed by: ANESTHESIOLOGY

## 2018-08-15 PROCEDURE — 63600175 PHARM REV CODE 636 W HCPCS: Performed by: SURGERY

## 2018-08-15 RX ORDER — FENTANYL CITRATE 50 UG/ML
INJECTION, SOLUTION INTRAMUSCULAR; INTRAVENOUS
Status: DISCONTINUED | OUTPATIENT
Start: 2018-08-15 | End: 2018-08-15

## 2018-08-15 RX ORDER — DIPHENHYDRAMINE HYDROCHLORIDE 50 MG/ML
25 INJECTION INTRAMUSCULAR; INTRAVENOUS EVERY 6 HOURS PRN
Status: DISCONTINUED | OUTPATIENT
Start: 2018-08-15 | End: 2018-08-15 | Stop reason: HOSPADM

## 2018-08-15 RX ORDER — HYDROCODONE BITARTRATE AND ACETAMINOPHEN 5; 325 MG/1; MG/1
1 TABLET ORAL EVERY 6 HOURS PRN
Qty: 15 TABLET | Refills: 0 | Status: SHIPPED | OUTPATIENT
Start: 2018-08-15 | End: 2018-08-15 | Stop reason: HOSPADM

## 2018-08-15 RX ORDER — HEPARIN SODIUM 1000 [USP'U]/ML
INJECTION, SOLUTION INTRAVENOUS; SUBCUTANEOUS
Status: DISCONTINUED | OUTPATIENT
Start: 2018-08-15 | End: 2018-08-15 | Stop reason: HOSPADM

## 2018-08-15 RX ORDER — FENTANYL CITRATE 50 UG/ML
25 INJECTION, SOLUTION INTRAMUSCULAR; INTRAVENOUS EVERY 5 MIN PRN
Status: DISCONTINUED | OUTPATIENT
Start: 2018-08-15 | End: 2018-08-15 | Stop reason: HOSPADM

## 2018-08-15 RX ORDER — MUPIROCIN 20 MG/G
OINTMENT TOPICAL
Status: DISCONTINUED | OUTPATIENT
Start: 2018-08-15 | End: 2021-01-01

## 2018-08-15 RX ORDER — CLINDAMYCIN PHOSPHATE 900 MG/50ML
900 INJECTION, SOLUTION INTRAVENOUS
Status: COMPLETED | OUTPATIENT
Start: 2018-08-15 | End: 2018-08-15

## 2018-08-15 RX ORDER — PROPOFOL 10 MG/ML
VIAL (ML) INTRAVENOUS CONTINUOUS PRN
Status: DISCONTINUED | OUTPATIENT
Start: 2018-08-15 | End: 2018-08-15

## 2018-08-15 RX ORDER — BUPIVACAINE HYDROCHLORIDE 2.5 MG/ML
INJECTION, SOLUTION INFILTRATION; PERINEURAL
Status: COMPLETED | OUTPATIENT
Start: 2018-08-15 | End: 2018-08-15

## 2018-08-15 RX ORDER — MIDAZOLAM HYDROCHLORIDE 1 MG/ML
INJECTION, SOLUTION INTRAMUSCULAR; INTRAVENOUS
Status: DISCONTINUED | OUTPATIENT
Start: 2018-08-15 | End: 2018-08-15

## 2018-08-15 RX ORDER — HYDROCODONE BITARTRATE AND ACETAMINOPHEN 5; 325 MG/1; MG/1
1 TABLET ORAL EVERY 6 HOURS PRN
Qty: 14 TABLET | Refills: 0 | Status: SHIPPED | OUTPATIENT
Start: 2018-08-15 | End: 2018-08-15 | Stop reason: SDUPTHER

## 2018-08-15 RX ORDER — ACETAMINOPHEN AND CODEINE PHOSPHATE 300; 30 MG/1; MG/1
1 TABLET ORAL EVERY 6 HOURS PRN
Qty: 30 TABLET | Refills: 0 | Status: SHIPPED | OUTPATIENT
Start: 2018-08-15 | End: 2018-08-25

## 2018-08-15 RX ORDER — HYDROCODONE BITARTRATE AND ACETAMINOPHEN 5; 325 MG/1; MG/1
1 TABLET ORAL EVERY 4 HOURS PRN
Status: DISCONTINUED | OUTPATIENT
Start: 2018-08-15 | End: 2018-08-15 | Stop reason: HOSPADM

## 2018-08-15 RX ORDER — SODIUM CHLORIDE 9 MG/ML
INJECTION, SOLUTION INTRAVENOUS CONTINUOUS PRN
Status: DISCONTINUED | OUTPATIENT
Start: 2018-08-15 | End: 2018-08-15

## 2018-08-15 RX ORDER — LIDOCAINE HYDROCHLORIDE 10 MG/ML
1 INJECTION, SOLUTION EPIDURAL; INFILTRATION; INTRACAUDAL; PERINEURAL ONCE
Status: COMPLETED | OUTPATIENT
Start: 2018-08-15 | End: 2018-08-15

## 2018-08-15 RX ADMIN — FENTANYL CITRATE 25 MCG: 50 INJECTION, SOLUTION INTRAMUSCULAR; INTRAVENOUS at 10:08

## 2018-08-15 RX ADMIN — PROPOFOL 50 MCG/KG/MIN: 10 INJECTION, EMULSION INTRAVENOUS at 10:08

## 2018-08-15 RX ADMIN — MUPIROCIN: 20 OINTMENT TOPICAL at 09:08

## 2018-08-15 RX ADMIN — SODIUM CHLORIDE: 9 INJECTION, SOLUTION INTRAVENOUS at 10:08

## 2018-08-15 RX ADMIN — LIDOCAINE HYDROCHLORIDE 1 MG: 10 INJECTION, SOLUTION EPIDURAL; INFILTRATION; INTRACAUDAL; PERINEURAL at 09:08

## 2018-08-15 RX ADMIN — MIDAZOLAM HYDROCHLORIDE 2 MG: 1 INJECTION, SOLUTION INTRAMUSCULAR; INTRAVENOUS at 10:08

## 2018-08-15 RX ADMIN — CLINDAMYCIN IN 5 PERCENT DEXTROSE 900 MG: 18 INJECTION, SOLUTION INTRAVENOUS at 10:08

## 2018-08-15 RX ADMIN — BUPIVACAINE HYDROCHLORIDE 10 ML: 2.5 INJECTION, SOLUTION INFILTRATION; PERINEURAL at 10:08

## 2018-08-15 RX ADMIN — MEPIVACAINE HYDROCHLORIDE 30 ML: 15 INJECTION, SOLUTION EPIDURAL; INFILTRATION at 10:08

## 2018-08-15 NOTE — TRANSFER OF CARE
"Anesthesia Transfer of Care Note    Patient: Kylie King    Procedure(s) Performed: Procedure(s) (LRB):  REVISION, AV FISTULA (Right)  TRANSPOSITION, VEIN (Right)    Patient location: [facility]    Anesthesia Type: general    Transport from OR: Transported from OR on 6-10 L/min O2 by face mask with adequate spontaneous ventilation    Post pain: adequate analgesia    Post assessment: no apparent anesthetic complications and tolerated procedure well    Post vital signs: stable    Level of consciousness: awake and alert    Nausea/Vomiting: no nausea/vomiting    Complications: none    Transfer of care protocol was followed      Last vitals:   Visit Vitals  BP (!) 127/58 (BP Location: Left leg, Patient Position: Lying)   Pulse 73   Temp 36.9 °C (98.5 °F) (Oral)   Resp 18   Ht 5' 4" (1.626 m)   Wt 118.8 kg (262 lb)   SpO2 96%   Breastfeeding? No   BMI 44.97 kg/m²     "

## 2018-08-15 NOTE — ANESTHESIA POSTPROCEDURE EVALUATION
"Anesthesia Post Evaluation    Patient: Kylie King    Procedure(s) Performed: Procedure(s) (LRB):  REVISION, AV FISTULA (Right)  TRANSPOSITION, VEIN (Right)    Final Anesthesia Type: general  Patient location during evaluation: PACU  Patient participation: Yes- Able to Participate  Level of consciousness: awake and alert  Post-procedure vital signs: reviewed and stable  Pain management: adequate  Airway patency: patent  PONV status at discharge: No PONV  Anesthetic complications: no      Cardiovascular status: hemodynamically stable  Respiratory status: unassisted  Hydration status: euvolemic  Follow-up not needed.        Visit Vitals  BP (!) 112/59   Pulse 66   Temp 36.6 °C (97.9 °F)   Resp 16   Ht 5' 4" (1.626 m)   Wt 118.8 kg (262 lb)   SpO2 (!) 92%   Breastfeeding? No   BMI 44.97 kg/m²       Pain/Mario Score: Pain Assessment Performed: Yes (8/15/2018 12:30 PM)  Presence of Pain: denies (8/15/2018 12:30 PM)  Mario Score: 10 (8/15/2018 12:30 PM)        "

## 2018-08-15 NOTE — INTERVAL H&P NOTE
The patient has been examined and the H&P has been reviewed:    I have personally examined the patient, and there are no changes since the H and P was written.    Anesthesia/Surgery risks, benefits and alternative options discussed and understood by patient/family.          Active Hospital Problems    Diagnosis  POA    Dialysis AV fistula malfunction [T82.590A]  Yes      Resolved Hospital Problems   No resolved problems to display.

## 2018-08-15 NOTE — DISCHARGE INSTRUCTIONS
Call for redness, swelling, pain, fever, signs of infection, or any concerns with surgery site      Caring for Your Hemodialysis Access  A problem such as an infection or a blood clot may make the access unusable. Typically, this happens more often with an arteriovenous graft than with an arteriovenous fistula. If this happens, youll need a new access. To help your access last, you will need to follow certain guidelines.  Watching for problems  Call your healthcare provider right away if you:  · Cant feel the blood flowing in the access (this sensation is called a thrill)  · Have pain or numbness in your hand or arm  · Have bleeding, redness, bluish discoloration, or warmth around your access  · Notice your access suddenly bulging out more than usual (a slight bulge is normal)  · Have a fever of 100.4°F (38°C) or higher, or as directed by your healthcare provider   Follow these and any other guidelines youre given  · Dont wear tight clothes or jewelry around your access.  · Dont let anyone take your blood pressure on or draw blood from the arm with the access. Also, dont let anyone put IV lines into it.  · Protect your access from being hit or cut.  · Wash your hands often and keep the area around the access clean.  · Do not carry anything heavy or do anything that would put pressure on the access.  Feeling for your thrill    If you put your fingers over your access, you should feel the blood rushing through it. This is called a thrill, and it feels like a vibration. Feel for the thrill as often as you're told, usually once or twice a day. If you can't feel it, tell your healthcare provider right away. Blood may not be flowing through your access the way it should.  Important numbers  Write the names and numbers of your healthcare providers below. That way you will know how to get in touch with them.  Doctor:  Name ___________________ Phone ___________________  Surgeon:  Name ___________________ Phone  ___________________  Dialysis Center:  Name ___________________ Phone ___________________   Date Last Reviewed: 1/1/2017 © 2000-2017 [employer]. [address] [place] [address]. All rights reserved. This information is not intended as a substitute for professional medical care. Always follow your healthcare professional's instructions.      Anesthesia: General Anesthesia     You are watched continuously during your procedure by your anesthesia provider.     Youre due to have surgery. During surgery, youll be given medicine called anesthesia or anesthetic. This will keep you comfortable and pain-free. Your anesthesia provider will use general anesthesia.  What is general anesthesia?  General anesthesia puts you into a state like deep sleep. It goes into the bloodstream (IV anesthetics), into the lungs (gas anesthetics), or both. You feel nothing during the procedure. You will not remember it. During the procedure, the anesthesia provider monitors you continuously. He or she checks your heart rate and rhythm, blood pressure, breathing, and blood oxygen.  · IV anesthetics. IV anesthetics are given through an IV line in your arm. Theyre often given first. This is so you are asleep before a gas anesthetic is started. Some kinds of IV anesthetics relieve pain. Others relax you. Your doctor will decide which kind is best in your case.  · Gas anesthetics. Gas anesthetics are breathed into the lungs. They are often used to keep you asleep. They can be given through a facemask or a tube placed in your larynx or trachea (breathing tube).  ¨ If you have a facemask, your anesthesia provider will most likely place it over your nose and mouth while youre still awake. Youll breathe oxygen through the mask as your IV anesthetic is started. Gas anesthetic may be added through the mask.  ¨ If you have a tube in the larynx or trachea, it will be inserted into your throat after youre asleep.  Anesthesia  tools and medicines  You will likely have:  · IV anesthetics. These are put into an IV line into your bloodstream.  · Gas anesthetics. You breathe these anesthetics into your lungs, where they pass into your bloodstream.  · Pulse oximeter. This is a small clip that is attached to the end of your finger. This measures your blood oxygen level.  · Electrocardiography leads (electrodes). These are small sticky pads that are placed on your chest. They record your heart rate and rhythm.  · Blood pressure cuff. This reads your blood pressure.  Risks and possible complications  General anesthesia has some risks. These include:  · Breathing problems  · Nausea and vomiting  · Sore throat or hoarseness (usually temporary)  · Allergic reaction to the anesthetic  · Irregular heartbeat (rare)  · Cardiac arrest (rare)   Anesthesia safety  · Follow all instructions you are given for how long not to eat or drink before your procedure.  · Be sure your doctor knows what medicines and drugs you take. This includes over-the-counter medicines, herbs, supplements, alcohol or other drugs. You will be asked when those were last taken.  · Have an adult family member or friend drive you home after the procedure.  · For the first 24 hours after your surgery:  ¨ Do not drive or use heavy equipment.  ¨ Do not make important decisions or sign legal documents. If important decisions or signing legal documents is necessary during the first 24 hours after surgery, have a trusted family member or spouse act on your behalf.  ¨ Avoid alcohol.  ¨ Have a responsible adult stay with you. He or she can watch for problems and help keep you safe.  Date Last Reviewed: 12/1/2016  © 2958-8688 [employer]. [address] [place] [address], [place] [address]. All rights reserved. This information is not intended as a substitute for professional medical care. Always follow your healthcare professional's instructions.

## 2018-08-15 NOTE — ANESTHESIA PREPROCEDURE EVALUATION
08/15/2018  Kylie King is a 62 y.o., female.  Patient Active Problem List   Diagnosis    History of intracranial hemorrhage    Hypertension    Hypercholesterolemia    DM type 2, uncontrolled, with neuropathy    Diabetes mellitus, type 2    Diabetic peripheral neuropathy associated with type 2 diabetes mellitus    S/P mastectomy    History of breast cancer    Seroma    Spinal stenosis, lumbar    DDD (degenerative disc disease), lumbar    Sacroiliac joint pain    Physical deconditioning    Facet syndrome    Myalgia and myositis    Lumbar facet arthropathy    Fall    Right elbow pain    S/P amputation of lesser toe    Incontinence of urine in female    Mixed urge and stress incontinence    Nocturia    Morbid obesity    Generalized weakness    Decreased range of motion (ROM) of shoulder    Decreased range of motion (ROM) of knee    Diabetic ulcer of right heel associated with type 2 diabetes mellitus, with fat layer exposed    Chronic venous insufficiency    Lymphedema of both lower extremities    Open wound of right heel    Lymphedema    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    Hypoxia    Pulmonary edema    Hypertensive emergency    Elevated troponin    TRISTON (acute kidney injury)    Anasarca    Heart failure, diastolic, chronic    Shortness of breath    Preop examination    End stage kidney disease    ESRD on dialysis    History of cerebrovascular accident    History of colon cancer    Former smoker    Chronic obstructive pulmonary disease    Hypoxemia    Dialysis AV fistula malfunction    Carotid artery stenosis         Anesthesia Evaluation         Review of Systems      Physical Exam  General:  Well nourished    Airway/Jaw/Neck:  Airway Findings: Mouth Opening: Normal Tongue: Normal  General Airway Assessment: Adult  Mallampati: II   Improves to II with phonation.  TM Distance: Normal, at least 6 cm      Dental:  Dental Findings: In tact   Chest/Lungs:  Chest/Lungs Findings: Clear to auscultation     Heart/Vascular:  Heart Findings: Rate: Normal  Rhythm: Regular Rhythm  Sounds: Normal        Mental Status:  Mental Status Findings:  Cooperative, Alert and Oriented         Anesthesia Plan  Type of Anesthesia, risks & benefits discussed:  Anesthesia Type:  MAC  Patient's Preference: Sedation  Intra-op Monitoring Plan: standard ASA monitors  Intra-op Monitoring Plan Comments:   Post Op Pain Control Plan: per primary service following discharge from PACU  Post Op Pain Control Plan Comments:   Induction:   IV  Beta Blocker:  Patient is not currently on a Beta-Blocker (No further documentation required).       Informed Consent: Patient understands risks and agrees with Anesthesia plan.  Questions answered.   ASA Score: 2     Day of Surgery Review of History & Physical:    H&P update referred to the surgeon.     Anesthesia Plan Notes: Sedation plan discussed.          Ready For Surgery From Anesthesia Perspective.

## 2018-08-15 NOTE — PROGRESS NOTES
Right arm placed in sling per  Sarah Jefferson RN do to anesthesia nerve block to extremity and pt having numbness and no sensation.  Instructed pt that once numbness wears off she may remove sling.  Pt verbalized understanding.

## 2018-08-15 NOTE — BRIEF OP NOTE
[facility]  Brief Operative Note     SUMMARY     Surgery Date: 8/15/2018     Surgeon(s) and Role:     * RICK Pollard III, MD - Primary     * Malinda Rose MD - Resident - Assisting        Pre-op Diagnosis:  Malfunction of arteriovenous dialysis fistula, subsequent encounter [T82.590D]    Post-op Diagnosis:  Post-Op Diagnosis Codes:     * Malfunction of arteriovenous dialysis fistula, subsequent encounter [T82.590D]    Procedure(s) (LRB):  REVISION, AV FISTULA (Right)  TRANSPOSITION, VEIN (Right)    Anesthesia: Regional    Description of the findings of the procedure: L RC fistula exposed and elevated    Findings/Key Components: Palpable thrill at completion of surgery    Estimated Blood Loss: 5        Specimens:   Specimen (12h ago, onward)    None          Discharge Note    SUMMARY     Admit Date: 8/15/2018    Discharge Date and Time:  08/15/2018 12:17 PM    Hospital Course (synopsis of major diagnoses, care, treatment, and services provided during the course of the hospital stay): Radiocephalic vein superficialization, outpatient procedure     Final Diagnosis: Post-Op Diagnosis Codes:     * Malfunction of arteriovenous dialysis fistula, subsequent encounter [T82.590D]    Disposition: Home or Self Care    Follow Up/Patient Instructions:     Medications:  Reconciled Home Medications:      Medication List      START taking these medications    HYDROcodone-acetaminophen 5-325 mg per tablet  Commonly known as:  NORCO  Take 1 tablet by mouth every 6 (six) hours as needed for Pain.        CHANGE how you take these medications    amLODIPine 10 MG tablet  Commonly known as:  NORVASC  Take 1 tablet (10 mg total) by mouth every evening.  What changed:  when to take this     hydrALAZINE 50 MG tablet  Commonly known as:  APRESOLINE  Take 1 tablet (50 mg total) by mouth 3 (three) times daily.  What changed:    · how much to take  · when to take this     insulin detemir U-100 100 unit/mL (3 mL) Inpn  "pen  Commonly known as:  LEVEMIR FLEXTOUCH U-100 INSULN  Inject 15 Units into the skin every evening.  What changed:    · how much to take  · when to take this  · additional instructions     letrozole 2.5 mg Tab  Commonly known as:  FEMARA  Take 1 tablet (2.5 mg total) by mouth every evening.  What changed:  when to take this     miconazole NITRATE 2 % 2 % top powder  Commonly known as:  MICOTIN  Apply topically 2 (two) times daily. Apply to groin and skin folds  What changed:    · when to take this  · reasons to take this  · additional instructions     ranitidine 150 MG capsule  Commonly known as:  ZANTAC  Take 1 capsule (150 mg total) by mouth nightly.  What changed:    · when to take this  · reasons to take this        CONTINUE taking these medications    acetaminophen-codeine 300-30mg 300-30 mg Tab  Commonly known as:  TYLENOL #3  TAKE ONE TABLET BY MOUTH EVERY 6 HOURS AS NEEDED     albuterol 90 mcg/actuation inhaler  Inhale 2 puffs into the lungs every 6 (six) hours as needed for Wheezing. Rescue     atorvastatin 40 MG tablet  Commonly known as:  LIPITOR  Take 1 tablet (40 mg total) by mouth once daily.     BD ULTRA-FINE MINI PEN NEEDLE 31 gauge x 3/16" Ndle  Generic drug:  pen needle, diabetic  USE 5 NEEDLES PER DAY     blood sugar diagnostic Strp  Commonly known as:  ONETOUCH ULTRA TEST  1 strip by Misc.(Non-Drug; Combo Route) route 4 (four) times daily before meals and nightly.     bumetanide 1 MG tablet  Commonly known as:  BUMEX  Take 1 tablet (1 mg total) by mouth 2 (two) times daily.     carvedilol 25 MG tablet  Commonly known as:  COREG  Take 1 tablet (25 mg total) by mouth 2 (two) times daily.     citalopram 20 MG tablet  Commonly known as:  CELEXA  Take 1 tablet (20 mg total) by mouth nightly.     ergocalciferol 50,000 unit Cap  Commonly known as:  ERGOCALCIFEROL  Take 50,000 Units by mouth every 7 days. Takes on Wednesdays     fish oil-omega-3 fatty acids 300-1,000 mg capsule  Take 1 capsule by mouth " "every morning.     gabapentin 400 MG capsule  Commonly known as:  NEURONTIN  Take 1 capsule (400 mg total) by mouth 2 (two) times daily.     insulin lispro 100 unit/mL Inpn pen  Commonly known as:  HumaLOG KwikPen Insulin  BLOOD GLUCOSE 150-200, INJECT 1 UNITS UNDER THE SKIN, 201-250, 2 UNITS, 251-300, 3 UNITS THREE TIMES A DAY AS DIRECTED     isosorbide dinitrate 10 MG tablet  Commonly known as:  ISORDIL  Take 10 mg by mouth 2 (two) times daily. Stated that she is being weaned off - Epic note has to stop - In Basket message sent to Cards     lancets 33 gauge Misc  Commonly known as:  ONETOUCH DELICA LANCETS  1 lancet by Misc.(Non-Drug; Combo Route) route 4 (four) times daily before meals and nightly.     * pen needle, diabetic 32 gauge x 5/32" Ndle  1 Device by Misc.(Non-Drug; Combo Route) route 5 (five) times daily.     * insulin needles (disposable) 31 Ndle  Commonly known as:  PEN NEEDLE, DIABETIC  Patient needs 5 needles a day     sevelamer carbonate 800 mg Tab  Commonly known as:  RENVELA  Take 1 tablet (800 mg total) by mouth 3 (three) times daily with meals.         * This list has 2 medication(s) that are the same as other medications prescribed for you. Read the directions carefully, and ask your doctor or other care provider to review them with you.              Discharge Procedure Orders   Diet general     Remove dressing in 72 hours     REASON FOR NOT PRESCRIBING ANTIPLATELET MEDICATION AT DISCHARGE     Order Specific Question Answer Comments   Reason for not Prescribing: Other (Comment)      REASON FOR NOT PRESCRIBING STATIN MEDICATION AT DISCHARGE     Activity as tolerated     Follow-up Information     W Fernando Pollard Iii, MD In 2 weeks.    Specialty:  Vascular Surgery  Why:  with duplex  Contact information:  [address] [facility] [id number] [phone]                 "

## 2018-08-15 NOTE — PROGRESS NOTES
Patient has had mastectomy on left with lymph node removal and has AV fistula on right, spoke to Dr. Khan and he stated ok to used left arm for iv access for surgical procedure today.

## 2018-08-15 NOTE — ANESTHESIA PROCEDURE NOTES
Supraclavicular Brachial Plexus Single Injection Block    Patient location during procedure: OR    Reason for block: primary anesthetic   Diagnosis: ESRD, AVF   Start time: 8/15/2018 10:33 AM  Timeout: 8/15/2018 10:32 AM   End time: 8/15/2018 10:43 AM  Surgery related to: Right arm AVF  Staffing  Anesthesiologist: Jacob Hammond MD  Resident/CRNA: Pedro Luis Rosenbaum MD  Preanesthetic Checklist  Completed: patient identified, site marked, surgical consent, pre-op evaluation, timeout performed, IV checked, risks and benefits discussed and monitors and equipment checked  Peripheral Block  Patient position: supine  Prep: ChloraPrep  Patient monitoring: heart rate, cardiac monitor, continuous pulse ox, continuous capnometry and frequent blood pressure checks  Block type: supraclavicular  Laterality: right  Injection technique: single shot  Needle  Needle type: Stimuplex   Needle gauge: 22 G  Needle length: 2 in  Needle localization: anatomical landmarks and ultrasound guidance   -ultrasound image captured on disc.  Assessment  Injection assessment: negative aspiration, negative parasthesia and local visualized surrounding nerve  Paresthesia pain: none  Heart rate change: no  Slow fractionated injection: yes  Additional Notes  Intercostal brachial field block performed with bupivicaine 10ml for additional coverage.    VSS.  See anesthesia record.  Patient tolerated procedure well.

## 2018-08-15 NOTE — OP NOTE
8/15/2018      Pre-operative Diagnosis:  L AV fistula malfunction  Post-operative Diagnosis: same.    Procedures:  Revision, L radiocephalic fistula with transposition    Surgeon: JAMIN Appiah MD     Assistants: Malinda Rose M.D.     Anesthesia: Regional/MAC    Findings/Key elements:   1. Cephalic exposed and elevated  2. Fistula with palpable thrill    Condition: stable    Procedure Details:    Indication: RUE fistula with poor maturation. Thrill felt proximally but not distally. The fistula has been evaluated and is patent and worth elevating to make it suitable for cannulation.    We discussed the risks of hemorrhage thrombosis infection and the rare risks of rupture but expected they would most likely do well with this was a good means to preserve the long-term durability of this autologous access.    Description of procedure:  Patient was taken to the operating room and prepped and draped in the usual sterile fashion including the R arm circumferentially. Anesthesia was via regional block.  A longitudinal incision was made overlying the proximal fistula, and the fistula exposed. This was continued proximally, taking care to no injure the fistula as we exposed it. Once a sufficient length was exposed, we then dissected sharply and circumferentially around the fistula. A 3-0 vicryl suture was use to approximate the fascial layer in simple interrupted fashion. After ensuring that the fistula was not kinked, a running subcuticular 4-0 monocryl was used to close the skin over the fistula. The patient had a palpable thrill at the completion of surgery    EBL:  5 mL           Complications:  None; patient tolerated the procedure well.           Disposition: Stable to recovery.    Dr. Appiah was scrubbed and present for procedure    Malinda Rose MD   Fellow, Vascular/Endovascular Surgery

## 2018-08-17 ENCOUNTER — PATIENT MESSAGE (OUTPATIENT)
Dept: CARDIOLOGY | Facility: CLINIC | Age: 63
End: 2018-08-17

## 2018-08-31 ENCOUNTER — OFFICE VISIT (OUTPATIENT)
Dept: VASCULAR SURGERY | Facility: CLINIC | Age: 63
End: 2018-08-31
Payer: MEDICARE

## 2018-08-31 ENCOUNTER — HOSPITAL ENCOUNTER (OUTPATIENT)
Dept: VASCULAR SURGERY | Facility: CLINIC | Age: 63
Discharge: HOME | End: 2018-08-31
Attending: SURGERY
Payer: MEDICARE

## 2018-08-31 VITALS
TEMPERATURE: 98 F | DIASTOLIC BLOOD PRESSURE: 89 MMHG | HEIGHT: 64 IN | HEART RATE: 65 BPM | BODY MASS INDEX: 44.56 KG/M2 | WEIGHT: 261 LBS | SYSTOLIC BLOOD PRESSURE: 146 MMHG

## 2018-08-31 DIAGNOSIS — T82.858D ARTERIOVENOUS FISTULA STENOSIS, SUBSEQUENT ENCOUNTER: Primary | ICD-10-CM

## 2018-08-31 DIAGNOSIS — T82.858A STENOSIS OF ARTERIOVENOUS DIALYSIS FISTULA, INITIAL ENCOUNTER: ICD-10-CM

## 2018-08-31 DIAGNOSIS — Z99.2 ESRD (END STAGE RENAL DISEASE) ON DIALYSIS: ICD-10-CM

## 2018-08-31 DIAGNOSIS — N18.6 ESRD (END STAGE RENAL DISEASE) ON DIALYSIS: ICD-10-CM

## 2018-08-31 PROCEDURE — 99213 OFFICE O/P EST LOW 20 MIN: CPT | Mod: PBBFAC,25 | Performed by: SURGERY

## 2018-08-31 PROCEDURE — 99999 PR PBB SHADOW E&M-EST. PATIENT-LVL III: CPT | Mod: PBBFAC,,, | Performed by: SURGERY

## 2018-08-31 PROCEDURE — 93990 DOPPLER FLOW TESTING: CPT | Mod: 26,S$PBB,, | Performed by: SURGERY

## 2018-08-31 PROCEDURE — 93990 DOPPLER FLOW TESTING: CPT | Mod: PBBFAC | Performed by: SURGERY

## 2018-08-31 PROCEDURE — 99024 POSTOP FOLLOW-UP VISIT: CPT | Mod: POP,,, | Performed by: SURGERY

## 2018-08-31 NOTE — PROGRESS NOTES
1HISTORY OF PRESENT ILLNESS:  A 62-year-old female with new end-stage renal   disease, dialyzing via right IJ PermCath .  She   is here for consideration of permanent dialysis access.  She is right-handed.    She has no history of AICD or pacemaker placement.    It sounds as though her renal failure came on rather suddenly and was   hospitalized for some time.  She is still using supplemental oxygen, but she is   feeling better and can now lie flat.    Now s/p    1. Transposition, R AVF 8/15/18  1. Diagnostic fistulagram 7/18/18  2. R lincoln AVF 5/28/18.     PAST MEDICAL HISTORY:  1.  History of breast cancer.  2.  History of choledocholithiasis.  3.  Hypertension.  4.  Hyperlipidemia.  5.  History of intracerebral hemorrhage.  6.  History of pancreatitis.  7.  History of stroke.  8.  Type 2 diabetes.  9. ESRD dialyzing Tue/Thur/Sat  10. Severe COPD on home o2    PAST SURGICAL HISTORY:  1.  Sigmoidectomy.  2.  Cholecystectomy.  3.  Nephrectomy.  4.  Breast biopsies.    FAMILY HISTORY:  Positive for diabetes and heart disease.    SOCIAL HISTORY:  Current smoker.    MEDICATIONS:  No anticoagulants or antiplatelet agents.  See EPIC for full list.    ALLERGIES:  KEFLEX, ERYTHROMYCIN AND CYCLOBENZAPRINE.    REVIEW OF SYSTEMS:  Denies postprandial pain or DVT.  All other systems   including eyes, ENT, respiratory, musculoskeletal, skin, psychiatric, heme,   lymph, allergy and immune are negative.    PHYSICAL EXAMINATION:  VITAL SIGNS:  See nursing note.  GENERAL:  She appears somewhat chronically ill using supplemental oxygen.    Resume normal effort.  Clear to auscultation.  CARDIAC:  Regular rate and rhythm.  Nondisplaced PMI, no murmur.  VASCULAR:  2+ radial and brachial pulses.  EXTREMITIES:  Her arms are somewhat generous in girth.  R arm shows healing lincoln tranposition incision, soft thrill with moderate pulsatility.  Strong 2+ radial proximal to anastomosis which is quite distal     IMAGING:   No stenosis, flow  rate low @ 273 (905 prior)    ASSESSMENT:    Low R AVF flow s/p transposition.  No stenosis seen on duplex.    PLAN:  1.  Fistulagram, R lincoln 9/12/18  2. May need antegrade and retrograde, would start with retrograde    I have explained the risks, benefits and alternatives for this procedure in detail.  The patients voices understanding and all questions have be answered, and agrees to proceed with the procedure.    JIM Pollard III, MD, FACS  Professor and Chief, Vascular and Endovascular Surgery

## 2018-09-12 ENCOUNTER — HOSPITAL ENCOUNTER (OUTPATIENT)
Facility: HOSPITAL | Age: 63
Discharge: HOME | End: 2018-09-12
Attending: SURGERY | Admitting: SURGERY
Payer: MEDICARE

## 2018-09-12 VITALS
BODY MASS INDEX: 43.66 KG/M2 | DIASTOLIC BLOOD PRESSURE: 65 MMHG | RESPIRATION RATE: 20 BRPM | HEIGHT: 64 IN | HEART RATE: 76 BPM | TEMPERATURE: 98 F | SYSTOLIC BLOOD PRESSURE: 129 MMHG | OXYGEN SATURATION: 97 % | WEIGHT: 255.75 LBS

## 2018-09-12 DIAGNOSIS — T82.858D STENOSIS OF OTHER VASCULAR PROSTHETIC DEVICES, IMPLANTS AND GRAFTS, SUBSEQUENT ENCOUNTER: ICD-10-CM

## 2018-09-12 DIAGNOSIS — T82.590A AV GRAFT MALFUNCTION: ICD-10-CM

## 2018-09-12 DIAGNOSIS — T82.590A DIALYSIS AV FISTULA MALFUNCTION: Primary | ICD-10-CM

## 2018-09-12 LAB
PERIPHERAL PTA: YES
POCT GLUCOSE: 144 MG/DL (ref 70–110)
POCT GLUCOSE: 148 MG/DL (ref 70–110)

## 2018-09-12 PROCEDURE — 82962 GLUCOSE BLOOD TEST: CPT

## 2018-09-12 PROCEDURE — 36902 INTRO CATH DIALYSIS CIRCUIT: CPT | Mod: 78,,, | Performed by: SURGERY

## 2018-09-12 PROCEDURE — 99152 MOD SED SAME PHYS/QHP 5/>YRS: CPT | Mod: ,,, | Performed by: SURGERY

## 2018-09-12 PROCEDURE — C1769 GUIDE WIRE: HCPCS

## 2018-09-12 PROCEDURE — 25000003 PHARM REV CODE 250

## 2018-09-12 PROCEDURE — 63600175 PHARM REV CODE 636 W HCPCS

## 2018-09-12 RX ORDER — LIDOCAINE HYDROCHLORIDE 10 MG/ML
1 INJECTION, SOLUTION EPIDURAL; INFILTRATION; INTRACAUDAL; PERINEURAL ONCE
Status: DISCONTINUED | OUTPATIENT
Start: 2018-09-12 | End: 2018-09-12 | Stop reason: HOSPADM

## 2018-09-12 RX ORDER — MUPIROCIN 20 MG/G
OINTMENT TOPICAL
Status: DISCONTINUED | OUTPATIENT
Start: 2018-09-12 | End: 2018-09-12 | Stop reason: HOSPADM

## 2018-09-12 RX ORDER — SODIUM CHLORIDE 0.9 % (FLUSH) 0.9 %
5 SYRINGE (ML) INJECTION
Status: DISCONTINUED | OUTPATIENT
Start: 2018-09-12 | End: 2018-09-12 | Stop reason: HOSPADM

## 2018-09-12 NOTE — PROGRESS NOTES
Patient discharged per MD orders. Instructions given on medications, wound care, activity, signs of infection, when to call MD, and follow up appointments. Pt verbalized understanding.  Patient AAOx3, VSS, no c/o pain or discomfort at this time.  PIV removed. Patient left unit via wheelchair with family.

## 2018-09-12 NOTE — INTERVAL H&P NOTE
The patient has been examined and the H&P has been reviewed:    I concur with the findings and no changes have occurred since H&P was written.    Anesthesia/Surgery risks, benefits and alternative options discussed and understood by patient/family.          Active Hospital Problems    Diagnosis  POA    Dialysis AV fistula malfunction [T82.590A]  Yes      Resolved Hospital Problems   No resolved problems to display.

## 2018-09-12 NOTE — BRIEF OP NOTE
[facility]  Brief Operative Note     SUMMARY     Surgery Date: 9/12/2018     Surgeon(s) and Role:     * RICK Pollard III, MD - Primary    Assisting Surgeon: None    Pre-op Diagnosis:  Stenosis, AVf    POST OP: same    PROCEDURES:    1. PTa, R avf (7x40)  2. fistualgram  3. Conscious Sedation    Anesthesia: local/sedation    Description of the findings of the procedure: outflow stenosis 75%, no residual after PTA    Findings/Key Components: as above    Estimated Blood Loss: <10cc         Specimens:   Specimen (12h ago, onward)    None          Discharge Note    SUMMARY     Admit Date: 9/12/2018    Discharge Date and Time: 9/12/18    Hospital Course (synopsis of major diagnoses, care, treatment, and services provided during the course of the hospital stay): successful outpatient procedure    Final Diagnosis: stenosis, AVF    Disposition: home    Follow Up/Patient Instructions: Diet: renal  Act: ad nehemiah  FU: 1 week with AVF duplex     Medications: pre-op

## 2018-09-12 NOTE — PLAN OF CARE
Problem: Patient Care Overview  Goal: Plan of Care Review  Outcome: Ongoing (interventions implemented as appropriate)  Received report from RINKU Turner. Patient s/p fistulagram, AAOx3. VSS, no c/o pain or discomfort at this time, resp even and unlabored. Bandaid dressing to R wrist is CDI. No active bleeding. No hematoma noted. Post procedure protocol reviewed with patient and patient's family. Understanding verbalized. Family members at bedside. Nurse call bell within reach. Will continue to monitor per post procedure protocol.

## 2018-09-12 NOTE — OP NOTE
DATE OF PROCEDURE:  09/12/2018    PREOPERATIVE DIAGNOSES:  Stenosis, right AV fistula.    POSTOPERATIVE DIAGNOSES:  Stenosis, right AV fistula.    PROCEDURES PERFORMED:  1.  Angioplasty, right AV fistula (7 x 40 Ultraverse).  2.  Right fistulogram (antegrade and retrograde).  3.  Conscious sedation.    SURGEON:  JIM Pollard III, M.D.    ASSISTANT:  None.    ANESTHESIA:  RN-directed sedation and local infiltration.    INDICATIONS:  A 62-year-old female status post transposition of a right Liv   AV fistula with low flow rates, but no stenosis noted on duplex imaging.    PROCEDURE IN DETAIL:  The patient was brought in the Cath Lab and placed in   supine position.  After sterile prep and drape and infiltration with 1%   lidocaine, the left Liv AV fistula was accessed in antegrade fashion with a   micropuncture set.  Fistulogram was then performed, which demonstrated   approximately 75% stenosis of the cephalic vein distal to the antegrade site.    The remainder of the vein was widely patent with no stenosis.  The radial artery   was noted to be diminutive and at the anastomosis, there did not appear to be a   stenosis beyond the caliber of the artery.    The antegrade fistulogram micropuncture sheath was removed and hemostasis was   achieved with a Monocryl suture.  A second retrograde access was then performed   using a micropuncture set after infiltration of 1% lidocaine.  A 6-Indian sheath   was placed over Glidewire.  Fistulogram confirmed the location of this distal   stenosis.  A 7 x 4 Ultraverse balloon was brought in the field, prepped, placed   over this area and then inflated to 14 atmospheres and held for 2 minutes.    Completion fistulogram revealed complete resolution of this stenosis.  The upper   arm and central venous fistulogram was then performed showing well-developed   dominant basilic vein outflow with no central venous stenosis.    All catheters and guidewires were removed and  hemostasis was achieved with a   Monocryl suture.  Sterile dressing was applied and the patient was taken to the   Recovery Room in stable condition.    I continued to monitor the patient's cardiopulmonary function throughout this   case.  See nursing notes for dosing of fentanyl and Versed.  Total sedation time   was 40 minutes.      MELI  dd: 09/12/2018 09:08:00 (CDT)  td: 09/12/2018 14:05:06 (CDT)  Doc ID   #[id number]  Job ID #[id number]    CC:

## 2018-09-21 ENCOUNTER — HOSPITAL ENCOUNTER (OUTPATIENT)
Dept: VASCULAR SURGERY | Facility: CLINIC | Age: 63
Discharge: HOME | End: 2018-09-21
Attending: STUDENT IN AN ORGANIZED HEALTH CARE EDUCATION/TRAINING PROGRAM
Payer: MEDICARE

## 2018-09-21 ENCOUNTER — OFFICE VISIT (OUTPATIENT)
Dept: VASCULAR SURGERY | Facility: CLINIC | Age: 63
End: 2018-09-21
Payer: MEDICARE

## 2018-09-21 VITALS
RESPIRATION RATE: 20 BRPM | HEART RATE: 66 BPM | SYSTOLIC BLOOD PRESSURE: 121 MMHG | HEIGHT: 64 IN | TEMPERATURE: 98 F | DIASTOLIC BLOOD PRESSURE: 77 MMHG | BODY MASS INDEX: 44.41 KG/M2 | WEIGHT: 260.13 LBS

## 2018-09-21 DIAGNOSIS — Z99.2 ESRD (END STAGE RENAL DISEASE) ON DIALYSIS: Primary | ICD-10-CM

## 2018-09-21 DIAGNOSIS — T82.590A DIALYSIS AV FISTULA MALFUNCTION: ICD-10-CM

## 2018-09-21 DIAGNOSIS — N18.6 ESRD (END STAGE RENAL DISEASE) ON DIALYSIS: Primary | ICD-10-CM

## 2018-09-21 PROCEDURE — 99999 PR PBB SHADOW E&M-EST. PATIENT-LVL III: CPT | Mod: PBBFAC,,, | Performed by: SURGERY

## 2018-09-21 PROCEDURE — 93990 DOPPLER FLOW TESTING: CPT | Mod: 26,S$PBB,, | Performed by: SURGERY

## 2018-09-21 PROCEDURE — 99024 POSTOP FOLLOW-UP VISIT: CPT | Mod: POP,,, | Performed by: SURGERY

## 2018-09-21 PROCEDURE — 99213 OFFICE O/P EST LOW 20 MIN: CPT | Mod: PBBFAC,25 | Performed by: SURGERY

## 2018-09-21 PROCEDURE — 93990 DOPPLER FLOW TESTING: CPT | Mod: PBBFAC | Performed by: SURGERY

## 2018-09-21 NOTE — PROGRESS NOTES
Ref: CLEMENTINA Siegel    HISTORY OF PRESENT ILLNESS:  A 62-year-old female with new end-stage renal   disease, dialyzing via right IJ PermCath .  She   is here for consideration of permanent dialysis access.  She is right-handed.    She has no history of AICD or pacemaker placement.    It sounds as though her renal failure came on rather suddenly and was   hospitalized for some time.  She is still using supplemental oxygen, but she is   feeling better and can now lie flat.    Now s/p    1. PTA, R AVF 9/12/18  2. Transposition, R AVF 8/15/18  3. Diagnostic fistulagram 7/18/18  4. R lincoln AVF 5/28/18.     PAST MEDICAL HISTORY:  1.  History of breast cancer.  2.  History of choledocholithiasis.  3.  Hypertension.  4.  Hyperlipidemia.  5.  History of intracerebral hemorrhage.  6.  History of pancreatitis.  7.  History of stroke.  8.  Type 2 diabetes.  9. ESRD dialyzing Tue/Thur/Sat  10. Severe COPD on home o2    PAST SURGICAL HISTORY:  1.  Sigmoidectomy.  2.  Cholecystectomy.  3.  Nephrectomy.  4.  Breast biopsies.    FAMILY HISTORY:  Positive for diabetes and heart disease.    SOCIAL HISTORY:  Current smoker.    MEDICATIONS:  No anticoagulants or antiplatelet agents.  See EPIC for full list.    ALLERGIES:  KEFLEX, ERYTHROMYCIN AND CYCLOBENZAPRINE.    REVIEW OF SYSTEMS:  Denies postprandial pain or DVT.  All other systems   including eyes, ENT, respiratory, musculoskeletal, skin, psychiatric, heme,   lymph, allergy and immune are negative.    PHYSICAL EXAMINATION:  VITAL SIGNS:  See nursing note.  GENERAL:  She appears somewhat chronically ill using supplemental oxygen.    Resume normal effort.  Clear to auscultation.  CARDIAC:  Regular rate and rhythm.  Nondisplaced PMI, no murmur.  VASCULAR:  2+ radial and brachial pulses.  EXTREMITIES:  Her arms are somewhat generous in girth.  R arm shows healed lincoln tranposition incision, better thrill with modest pulsatility.  Strong 2+ radial proximal to anastomosis which is quite  distal     IMAGING:   No stenosis, flow rate low 607 (prior 273(    Diameter 7.8 prox, 6.1 mid,    ASSESSMENT:    Low R AVF flow s/p transposition.  Reasonably matured    PLAN:  1.  May cannulate in 1 week  2. FU in 4 wks for permacath removal if using AVF exclusively      CC: CLEMENTINA Pollard III, MD, FACS  Professor and Chief, Vascular and Endovascular Surgery

## 2018-10-05 ENCOUNTER — OFFICE VISIT (OUTPATIENT)
Dept: INTERNAL MEDICINE | Facility: CLINIC | Age: 63
End: 2018-10-05
Payer: MEDICARE

## 2018-10-05 ENCOUNTER — HOSPITAL ENCOUNTER (OUTPATIENT)
Dept: RADIOLOGY | Facility: HOSPITAL | Age: 63
Discharge: HOME | End: 2018-10-05
Attending: INTERNAL MEDICINE
Payer: MEDICARE

## 2018-10-05 ENCOUNTER — OFFICE VISIT (OUTPATIENT)
Dept: OPTOMETRY | Facility: CLINIC | Age: 63
End: 2018-10-05
Payer: MEDICARE

## 2018-10-05 VITALS
SYSTOLIC BLOOD PRESSURE: 123 MMHG | TEMPERATURE: 99 F | BODY MASS INDEX: 44.61 KG/M2 | RESPIRATION RATE: 18 BRPM | HEART RATE: 81 BPM | HEIGHT: 64 IN | DIASTOLIC BLOOD PRESSURE: 86 MMHG | WEIGHT: 261.31 LBS

## 2018-10-05 DIAGNOSIS — H25.13 NUCLEAR SCLEROSIS, BILATERAL: ICD-10-CM

## 2018-10-05 DIAGNOSIS — M54.17 THORACIC AND LUMBOSACRAL NEURITIS: ICD-10-CM

## 2018-10-05 DIAGNOSIS — M43.10 ACQUIRED SPONDYLOLISTHESIS: ICD-10-CM

## 2018-10-05 DIAGNOSIS — M51.37 DDD (DEGENERATIVE DISC DISEASE), LUMBOSACRAL: ICD-10-CM

## 2018-10-05 DIAGNOSIS — N18.6 ESRD (END STAGE RENAL DISEASE): ICD-10-CM

## 2018-10-05 DIAGNOSIS — Q06.8 TETHERED CORD: ICD-10-CM

## 2018-10-05 DIAGNOSIS — R06.02 SOB (SHORTNESS OF BREATH): ICD-10-CM

## 2018-10-05 DIAGNOSIS — M54.14 THORACIC AND LUMBOSACRAL NEURITIS: ICD-10-CM

## 2018-10-05 DIAGNOSIS — M47.819 SPONDYLOSIS WITHOUT MYELOPATHY: ICD-10-CM

## 2018-10-05 DIAGNOSIS — M54.50 BILATERAL LOW BACK PAIN WITHOUT SCIATICA, UNSPECIFIED CHRONICITY: ICD-10-CM

## 2018-10-05 DIAGNOSIS — F32.A DEPRESSION, UNSPECIFIED DEPRESSION TYPE: ICD-10-CM

## 2018-10-05 DIAGNOSIS — E11.9 TYPE 2 DIABETES MELLITUS WITHOUT RETINOPATHY: Primary | ICD-10-CM

## 2018-10-05 DIAGNOSIS — M48.062 SPINAL STENOSIS OF LUMBAR REGION WITH NEUROGENIC CLAUDICATION: Primary | ICD-10-CM

## 2018-10-05 DIAGNOSIS — I10 ESSENTIAL HYPERTENSION: ICD-10-CM

## 2018-10-05 PROCEDURE — 71046 X-RAY EXAM CHEST 2 VIEWS: CPT | Mod: TC,PO

## 2018-10-05 PROCEDURE — 71046 X-RAY EXAM CHEST 2 VIEWS: CPT | Mod: 26,,, | Performed by: RADIOLOGY

## 2018-10-05 PROCEDURE — 99213 OFFICE O/P EST LOW 20 MIN: CPT | Mod: PBBFAC,25,27,PO | Performed by: INTERNAL MEDICINE

## 2018-10-05 PROCEDURE — 99214 OFFICE O/P EST MOD 30 MIN: CPT | Mod: S$PBB,,, | Performed by: INTERNAL MEDICINE

## 2018-10-05 PROCEDURE — 99999 PR PBB SHADOW E&M-EST. PATIENT-LVL II: CPT | Mod: PBBFAC,,, | Performed by: OPTOMETRIST

## 2018-10-05 PROCEDURE — 99999 PR PBB SHADOW E&M-EST. PATIENT-LVL III: CPT | Mod: PBBFAC,,, | Performed by: INTERNAL MEDICINE

## 2018-10-05 PROCEDURE — 92012 INTRM OPH EXAM EST PATIENT: CPT | Mod: S$PBB,,, | Performed by: OPTOMETRIST

## 2018-10-05 PROCEDURE — 99212 OFFICE O/P EST SF 10 MIN: CPT | Mod: PBBFAC,25,PO | Performed by: OPTOMETRIST

## 2018-10-05 PROCEDURE — 92015 DETERMINE REFRACTIVE STATE: CPT | Mod: ,,, | Performed by: OPTOMETRIST

## 2018-10-05 RX ORDER — LIDOCAINE 50 MG/G
1 PATCH TOPICAL DAILY
Qty: 30 PATCH | Refills: 0 | Status: SHIPPED | OUTPATIENT
Start: 2018-10-05 | End: 2019-03-19

## 2018-10-05 RX ORDER — ACETAMINOPHEN AND CODEINE PHOSPHATE 300; 30 MG/1; MG/1
1 TABLET ORAL EVERY 6 HOURS PRN
Qty: 90 TABLET | Refills: 2 | Status: SHIPPED | OUTPATIENT
Start: 2018-10-05 | End: 2019-05-07 | Stop reason: SDUPTHER

## 2018-10-05 NOTE — PROGRESS NOTES
"HPI     DLS: 06/22/2018    Patient states she is concerned because her new rx does have the   "astimatism part" in the script. She never got the new sript filled. Pt is   wearing her old glasses.She is also concerned because since her last visit   she has had a very hard time reading. She feels her VA is worse now than   when I saw her in June.    Last edited by Rajiv Hurst, OD on 10/5/2018 10:50 AM. (History)        ROS     Positive for: Endocrine (DM)    Negative for: Constitutional, Gastrointestinal, Neurological, Skin,   Genitourinary, Musculoskeletal, HENT, Cardiovascular, Eyes, Respiratory,   Psychiatric, Allergic/Imm, Heme/Lymph    Last edited by Rajiv Hurst, OD on 10/5/2018 10:50 AM. (History)        Assessment /Plan     For exam results, see Encounter Report.    Type 2 diabetes mellitus without retinopathy    Nuclear sclerosis, bilateral      1. Reduced VA 2 to Cat OU--discussed surgery--pt wishes to sched cat eval  2. DM- WITHOUT RETINOPATHY.  Advised yearly DFE    PLAN:    Surgical consult--Dr Moncada                 "

## 2018-10-05 NOTE — PROGRESS NOTES
CC: followup of hypertension and diabetes  HPI:  The patient is a 62 y.o. year old female who presents to the office for followup of hypertension and diabetes.  The patient denies any chest pain, shortness of breath, headache, blurred vision, excessive fatigue, nausea or vomiting.  She complained of 2 week history of diarrhea.  She reports abdominal pain, nausea and tenderness.  She also reports low-grade fever x2 weeks.  She states she completed Z-Claude recently due to sinus infection, reporting postnasal drip and clear rhinorrhea as well as nasal congestion.  The patient has been taking gabapentin for back pain. However, due to her kidney function, it is recommended that she decrease the dose.    PAST MEDICAL HISTORY:  Past Medical History:   Diagnosis Date    Anxiety     Arthritis     DDD, Lumbar    Breast cancer 1/2013    left breast- invasive mammary carcinoma, ER/PA positive, Her2 negative    Carotid artery stenosis 8/13/2018 6/22/2018: Carotid Duplex: SHANELL: Moderate plaquing - 2.0 m/s - <50%. LICA: Moderate plaquing - 1.6 m/s - <50%.    Choledocholithiasis     s/p ERCP and stent placement    Chronic obstructive pulmonary disease 6/8/2018    Chronic venous insufficiency     Colon cancer 2001    CRI (chronic renal insufficiency)     one kidney    Dialysis AV fistula malfunction     ESRD (end stage renal disease) on dialysis     Former smoker 6/8/2018    GERD (gastroesophageal reflux disease)     History of acute pancreatitis 2009 2/09 s/p sphincterotomy    History of cerebrovascular accident 6/8/2018    History of colon cancer     s/p sigmoid colectomy    HTN (hypertension), benign     Hypercholesterolemia     Hyperlipidemia     Hypoxemia 6/8/2018    Intracerebral hemorrhage     Kidney stone     left    Lymphedema of both lower extremities     Obesity     Pancreatitis     Pancreatitis 2008    Physical deconditioning     Pulmonary edema     Sacroiliac joint pain     Spinal  stenosis, lumbar     Stroke 12/2012    Tobacco abuse     Type 2 diabetes mellitus with neurological manifestations, controlled     Neuropathy       SURGICAL HISTORY:  Past Surgical History:   Procedure Laterality Date    AMPUTATION-TOE, partial Left 11/25/2016    Performed by Jose Delacruz Jr., DPM at [facility] OR 2ND FLR    AV FISTULA PLACEMENT Right 5/28/2018    Procedure: CREATION -FISTULA-AV;  Surgeon: RICK Pollard III, MD;  Location: [facility] OR [address] [place] [address];  Service: Peripheral Vascular;  Laterality: Right;    BIOPSY, LYMPH NODE, SENTINEL Left 2/25/2013    Performed by Dirk Oneil MD at [facility] OR [facility]    BLOCK-NERVE-MEDIAL BRANCH-LUMBAR Bilateral 3/1/2016    [facility]    BLOCK-NERVE-MEDIAL BRANCH-LUMBAR Bilateral 2/23/2016    [facility]    BREAST BIOPSY  1/2012    left breast invasive mammary carcinoma (lateral bx) and intraductal papilloma (medial bx)    BREAST BIOPSY  1999    rt breast FA    CHOLECYSTECTOMY  2001    COLON SURGERY      CREATION -FISTULA-AV Right 5/28/2018    Performed by RICK Pollard III, MD at [facility] OR 2ND FLR    HERNIA REPAIR      INJECTION, FOR SENTINEL NODE IDENTIFICATION Left 2/25/2013    Performed by Dirk Oneil MD at [facility]    INJECTION-JOINT Bilateral 1/20/2016    Performed by Stacie Negrete MD at [facility] MGT    INSERTION, TISSUE EXPANDER, BREAST Left 2/25/2013    Performed by Terry Moreno MD at [facility] OR 2ND FLR    INSERTION-CATHETER-DIALYSIS Right 4/23/2018    [facility]    INSERTION-CATHETER-BRENNEN CATH - C-ARM N/A 11/11/2016    [facility]ount [facility] OR    left nephrectomy      atrophic kidney and hydronephrosis    left oopherectomy  2001    MASTECTOMY  2013    left    MASTECTOMY Left 2/25/2013    Performed by Dirk Oneil MD at [facility] OR 2ND [facility]LR    MASTOPEXY Right 2/25/2013     [facility]    NEPHRECTOMY  2011    lap    RADIOFREQUENCY THERMOCOAGULATION (RFTC)-NERVE-MEDIAN BRANCH-LUMBAR Left 3/30/2016    Performed by St[facility]ie Negrete [facility] at [facility]    RADIOFREQUENCY THERMOCOAGULATION (RFTC)-NERVE-MEDIAN BRANCH-LUMBAR Right 3/16/2016    Performed by [facility] at [facility]    RECONSTRUCTION, BREAST, WITH LATISSIMUS DORSI MYOCUTANEOUS FLAP Left 4/11/2013    Performed by Terry Moreno MD at [facility] OR [address] [place] [address]    RECONSTRUCTION-NIPPLE Left 1/9/2014    Performed by Terry Moreno MD at [facility] OR [address] [place] [address]    REPAIR, HERNIA, VENTRAL, LAPAROSCOPIC N/A 3/28/2014    [facility] Min [facility]anco [facility]    REVISION Bilateral 1/9/2014    Performed by Terry Moreno MD at [facility] OR [address] [place] [address]    REVISION OF ARTERIOVENOUS FISTULA Right 8/15/2018    Procedure: REVISION, AV FISTULA;  Surgeon: RICK Pollard III, MD;  Location: [facility] OR [address] [place] [address];  Service: Peripheral Vascular;  Laterality: Right;    REVISION, AV FISTULA Right 8/15/2018    Performed by RICK Pollard III, MD at [facility] OR [address] [place] [address]    SIGMOIDECTOMY  2001    TOTAL REDUCTION MAMMOPLASTY Right 2013    TRANSPOSITION, VEIN Right 8/15/2018    Performed by RICK Pollard III, MD at [facility] OR [address] [place] [address]       MEDS:  Medcard reviewed and updated    ALLERGIES: Allergy Card reviewed and updated    SOCIAL HISTORY:   The patient is a nonsmoker.    PE:   APPEARANCE: Well nourished, well developed, in no acute distress.    CHEST: Lungs clear to auscultation with unlabored respirations.  CARDIOVASCULAR: Normal S1, S2. No murmurs. No carotid bruits. No pedal edema.  ABDOMEN: Bowel sounds normal. Not distended. Soft. No tenderness or masses.   MUSCULOSKELETAL: In wheelchair.  PSYCHIATRIC: The patient is oriented to person, place, and time and has a pleasant affect.        ASSESSMENT/PLAN:  Kylie was seen today for follow-up, diabetes, medication  refill and low-back pain.    Diagnoses and all orders for this visit:    Spinal stenosis of lumbar region with neurogenic claudication  -     gabapentin (NEURONTIN) 400 MG capsule; Take 1 capsule (400 mg total) by mouth every evening.  -     decrease gabapentin to 400 mg every evening    Uncontrolled type 2 diabetes mellitus with stage 4 chronic kidney disease, with long-term current use of insulin  -     continue insulin therapy    ESRD (end stage renal disease)  -     hemodialysis Tuesday, Thursday, Saturday    Essential hypertension  -     carvedilol (COREG) 25 MG tablet; Take 1 tablet (25 mg total) by mouth 2 (two) times daily.    Depression, unspecified depression type  -     citalopram (CELEXA) 20 MG tablet; Take 1 tablet (20 mg total) by mouth nightly.    Tethered cord  -     gabapentin (NEURONTIN) 400 MG capsule; Take 1 capsule (400 mg total) by mouth every evening.    Spondylosis without myelopathy  -     gabapentin (NEURONTIN) 400 MG capsule; Take 1 capsule (400 mg total) by mouth every evening.    DDD (degenerative disc disease), lumbosacral  -     gabapentin (NEURONTIN) 400 MG capsule; Take 1 capsule (400 mg total) by mouth every evening.    Thoracic and lumbosacral neuritis  -     gabapentin (NEURONTIN) 400 MG capsule; Take 1 capsule (400 mg total) by mouth every evening.    Acquired spondylolisthesis  -     gabapentin (NEURONTIN) 400 MG capsule; Take 1 capsule (400 mg total) by mouth every evening.    Bilateral low back pain without sciatica, unspecified chronicity  -     gabapentin (NEURONTIN) 400 MG capsule; Take 1 capsule (400 mg total) by mouth every evening.    Other orders  -     Discontinue: insulin detemir U-100 (LEVEMIR FLEXTOUCH U-100 INSULN) 100 unit/mL (3 mL) SubQ InPn pen; Inject 45 Units into the skin 2 (two) times daily. Take 45 unit in am and pm  -     acetaminophen-codeine 300-30mg (TYLENOL #3) 300-30 mg Tab; Take 1 tablet by mouth every 6 (six) hours as needed.  -     lidocaine  "(LIDODERM) 5 %; Place 1 patch onto the skin once daily. Apply patch to affected area for 12 hours, then remove for 12 hours.  -     amLODIPine (NORVASC) 10 MG tablet; Take 1 tablet (10 mg total) by mouth every morning.  -     atorvastatin (LIPITOR) 40 MG tablet; Take 1 tablet (40 mg total) by mouth once daily.  -     bumetanide (BUMEX) 1 MG tablet; Take 1 tablet (1 mg total) by mouth 2 (two) times daily.  -     insulin detemir U-100 (LEVEMIR FLEXTOUCH U-100 INSULN) 100 unit/mL (3 mL) SubQ InPn pen; Inject 45 Units into the skin 2 (two) times daily.  -     insulin lispro (HUMALOG KWIKPEN INSULIN) 100 unit/mL InPn pen; BLOOD GLUCOSE 150-200, INJECT 1 UNITS UNDER THE SKIN, 201-250, 2 UNITS, 251-300, 3 UNITS THREE TIMES A DAY AS DIRECTED  -     pen needle, diabetic 32 gauge x 5/32" Ndle; 1 Device by Misc.(Non-Drug; Combo Route) route 5 (five) times daily.  -     letrozole (FEMARA) 2.5 mg Tab; Take 1 tablet (2.5 mg total) by mouth nightly.        "

## 2018-10-09 LAB — HBA1C MFR BLD: 7.8 %

## 2018-10-11 DIAGNOSIS — N18.6 ESRD (END STAGE RENAL DISEASE) ON DIALYSIS: Primary | ICD-10-CM

## 2018-10-11 DIAGNOSIS — Z99.2 ESRD (END STAGE RENAL DISEASE) ON DIALYSIS: Primary | ICD-10-CM

## 2018-10-11 RX ORDER — PEN NEEDLE, DIABETIC 30 GX3/16"
1 NEEDLE, DISPOSABLE MISCELLANEOUS
Qty: 450 EACH | Refills: 6 | Status: SHIPPED | OUTPATIENT
Start: 2018-10-11 | End: 2021-01-01

## 2018-10-11 RX ORDER — AMLODIPINE BESYLATE 10 MG/1
10 TABLET ORAL EVERY MORNING
Qty: 90 TABLET | Refills: 3 | Status: SHIPPED | OUTPATIENT
Start: 2018-10-11 | End: 2020-03-13 | Stop reason: SDUPTHER

## 2018-10-11 RX ORDER — LETROZOLE 2.5 MG/1
2.5 TABLET, FILM COATED ORAL NIGHTLY
Qty: 90 TABLET | Refills: 3 | Status: SHIPPED | OUTPATIENT
Start: 2018-10-11 | End: 2020-03-18 | Stop reason: SDUPTHER

## 2018-10-11 RX ORDER — INSULIN LISPRO 100 [IU]/ML
INJECTION, SOLUTION INTRAVENOUS; SUBCUTANEOUS
Qty: 1 BOX | Refills: 3 | Status: SHIPPED | OUTPATIENT
Start: 2018-10-11 | End: 2019-05-07 | Stop reason: SDUPTHER

## 2018-10-11 RX ORDER — ATORVASTATIN CALCIUM 40 MG/1
40 TABLET, FILM COATED ORAL DAILY
Qty: 90 TABLET | Refills: 3 | Status: SHIPPED | OUTPATIENT
Start: 2018-10-11 | End: 2020-03-18 | Stop reason: SDUPTHER

## 2018-10-11 RX ORDER — GABAPENTIN 400 MG/1
400 CAPSULE ORAL NIGHTLY
Qty: 90 CAPSULE | Refills: 3 | Status: SHIPPED | OUTPATIENT
Start: 2018-10-11 | End: 2020-03-18 | Stop reason: SDUPTHER

## 2018-10-11 RX ORDER — BUMETANIDE 1 MG/1
1 TABLET ORAL 2 TIMES DAILY
Qty: 180 TABLET | Refills: 3 | Status: SHIPPED | OUTPATIENT
Start: 2018-10-11 | End: 2019-01-30

## 2018-10-11 RX ORDER — CITALOPRAM 20 MG/1
20 TABLET, FILM COATED ORAL NIGHTLY
Qty: 90 TABLET | Refills: 3 | Status: SHIPPED | OUTPATIENT
Start: 2018-10-11 | End: 2020-03-18 | Stop reason: SDUPTHER

## 2018-10-11 RX ORDER — CARVEDILOL 25 MG/1
25 TABLET ORAL 2 TIMES DAILY
Qty: 180 TABLET | Refills: 3 | Status: SHIPPED | OUTPATIENT
Start: 2018-10-11 | End: 2020-03-18 | Stop reason: SDUPTHER

## 2018-10-12 ENCOUNTER — HOSPITAL ENCOUNTER (OUTPATIENT)
Dept: VASCULAR SURGERY | Facility: CLINIC | Age: 63
Discharge: HOME | End: 2018-10-12
Attending: SURGERY
Payer: MEDICARE

## 2018-10-12 ENCOUNTER — OFFICE VISIT (OUTPATIENT)
Dept: VASCULAR SURGERY | Facility: CLINIC | Age: 63
End: 2018-10-12
Payer: MEDICARE

## 2018-10-12 VITALS
TEMPERATURE: 98 F | DIASTOLIC BLOOD PRESSURE: 83 MMHG | HEART RATE: 68 BPM | HEIGHT: 64 IN | WEIGHT: 262 LBS | BODY MASS INDEX: 44.73 KG/M2 | SYSTOLIC BLOOD PRESSURE: 135 MMHG

## 2018-10-12 DIAGNOSIS — Z99.2 ESRD (END STAGE RENAL DISEASE) ON DIALYSIS: Primary | ICD-10-CM

## 2018-10-12 DIAGNOSIS — N18.6 ESRD (END STAGE RENAL DISEASE) ON DIALYSIS: ICD-10-CM

## 2018-10-12 DIAGNOSIS — Z99.2 ESRD (END STAGE RENAL DISEASE) ON DIALYSIS: ICD-10-CM

## 2018-10-12 DIAGNOSIS — N18.6 ESRD (END STAGE RENAL DISEASE) ON DIALYSIS: Primary | ICD-10-CM

## 2018-10-12 DIAGNOSIS — T82.858D ARTERIOVENOUS FISTULA STENOSIS, SUBSEQUENT ENCOUNTER: Primary | ICD-10-CM

## 2018-10-12 PROCEDURE — 93990 DOPPLER FLOW TESTING: CPT | Mod: 26,S$PBB,, | Performed by: SURGERY

## 2018-10-12 PROCEDURE — 99024 POSTOP FOLLOW-UP VISIT: CPT | Mod: S$PBB,,, | Performed by: SURGERY

## 2018-10-12 PROCEDURE — 93990 DOPPLER FLOW TESTING: CPT | Mod: PBBFAC | Performed by: SURGERY

## 2018-10-12 PROCEDURE — 99999 PR PBB SHADOW E&M-EST. PATIENT-LVL IV: CPT | Mod: PBBFAC,,, | Performed by: SURGERY

## 2018-10-12 PROCEDURE — 99214 OFFICE O/P EST MOD 30 MIN: CPT | Mod: PBBFAC,25 | Performed by: SURGERY

## 2018-10-12 NOTE — PROGRESS NOTES
Kylie King  10/12/2018    HPI:  Patient is a 62 y.o. female with a h/o DMII, stage V CKD on HD breast cancer, choledocholithiasis, HTN, HLD, h/o intracerebral hemorrhage, pancreatitis here today for follow up regarding right RC fistula. Used her fistula twice successfully but yesterday the HD center was unable to complete HD through it and had to use permacath. Denies pain, swelling, or any issues with her right arm.     History of CVA. No MI  Tobacco use: smokes a few cigarettes a week, previous 14 pk year smoker.     S/p - all by Dr Pollard: R RC VF creation May 2018, transposed in Aug 2018; PTA mid-stenosis Sept 2018 w 7mm balloon    Past Medical History:   Diagnosis Date    Anxiety     Arthritis     DDD, Lumbar    Breast cancer 1/2013    left breast- invasive mammary carcinoma, ER/FL positive, Her2 negative    Carotid artery stenosis 8/13/2018 6/22/2018: Carotid Duplex: SHANELL: Moderate plaquing - 2.0 m/s - <50%. LICA: Moderate plaquing - 1.6 m/s - <50%.    Choledocholithiasis     s/p ERCP and stent placement    Chronic obstructive pulmonary disease 6/8/2018    Chronic venous insufficiency     Colon cancer 2001    CRI (chronic renal insufficiency)     one kidney    Dialysis AV fistula malfunction     ESRD (end stage renal disease) on dialysis     Former smoker 6/8/2018    GERD (gastroesophageal reflux disease)     History of acute pancreatitis 2009 2/09 s/p sphincterotomy    History of cerebrovascular accident 6/8/2018    History of colon cancer     s/p sigmoid colectomy    HTN (hypertension), benign     Hypercholesterolemia     Hyperlipidemia     Hypoxemia 6/8/2018    Intracerebral hemorrhage     Kidney stone     left    Lymphedema of both lower extremities     Obesity     Pancreatitis     Pancreatitis 2008    Physical deconditioning     Pulmonary edema     Sacroiliac joint pain     Spinal stenosis, lumbar     Stroke 12/2012    Tobacco abuse     Type 2 diabetes  mellitus with neurological manifestations, controlled     Neuropathy     Past Surgical History:   Procedure Laterality Date    AMPUTATION-TOE, partial Left 11/25/2016    Performed by Jose Delacruz Jr., DPM at [facility] OR 2ND FLR    AV FISTULA PLACEMENT Right 5/28/2018    Procedure: CREATION -FISTULA-AV;  Surgeon: RICK Pollard III, MD;  Location: [facility] OR [address] [place] [address];  Service: Peripheral Vascular;  Laterality: Right;    BIOPSY, LYMPH NODE, SENTINEL Left 2/25/2013    [facility]    BLOCK-NERVE-MEDIAL BRANCH-LUMBAR Bilateral 3/1/2016    Performed by [facility] at [facility] MGT    BLOCK-NERVE-MEDIAL BRANCH-LUMBAR Bilateral 2/23/2016    [facility]    BREAST BIOPSY  1/2012    left breast invasive mammary carcinoma (lateral bx) and intraductal papilloma (medial bx)    BREAST BIOPSY  1999    rt breast FA    CHOLECYSTECTOMY  2001    COLON SURGERY      CREATION -FISTULA-AV Right 5/28/2018    Performed by RICK Pollard III, [facility]    HERNIA REPAIR      INJECTION, FOR SENTINEL NODE IDENTIFICATION Left 2/25/2013    [facility]    INJECTION-JOINT Bilateral 1/20/2016    Performed by Stacie Negrete MD at [facility] PAIN MGT    INSERTION, TISSUE EXPANDER, BREAST Left 2/25/2013    Performed by Terry Moreno MD at [facility] OR 2ND FLR    INSERTION-CATHETER-DIALYSIS Right 4/23/2018    [facility]ae [facility]guero [facility]    INSERTION-CATHETER-BRENNEN CATH - C-ARM N/A 11/11/2016    Performed by Bobby Blount Jr., MD at [facility] OR    left nephrectomy      atrophic kidney and hydronephrosis    left oopherectomy  2001    MASTECTOMY  2013    left    MASTECTOMY Left 2/25/2013    Performed by Dirk Oneil MD at [facility] OR 2ND FLR    MASTOPEXY Right 2/25/2013    Performed by Terry Moreno MD at [facility] OR 2ND FLR    NEPHRECTOMY  2011    lap     RADIOFREQUENCY THERMOCOAGULATION (RFTC)-NERVE-MEDIAN BRANCH-LUMBAR Left 3/30/2016    Performed by Stacie Negrete MD at [facility]    RADIOFREQUENCY THERMOCOAGULATION (RFTC)-NERVE-MEDIAN BRANCH-LUMBAR Right 3/16/2016    Performed by [facility]te MD at [facility]    RECONSTRUCTION, BREAST, WITH LATISSIMUS DORSI MYOCUTANEOUS FLAP Left 4/11/2013    Performed by Terry Moreno MD at [facility] OR [address] [place] [address]    RECONSTRUCTION-NIPPLE Left 1/9/2014    Performed by Terry Moreno MD at [facility] OR [address] [place] [address]    REPAIR, HERNIA, VENTRAL, LAPAROSCOPIC N/A 3/28/2014    Performed by Min Polanco MD at [facility] OR [address] [place] [address]    REVISION Bilateral 1/9/2014    Performed by Terry Moreno MD at [facility] OR [address] [place] [address]    REVISION OF ARTERIOVENOUS FISTULA Right 8/15/2018    Procedure: REVISION, AV FISTULA;  Surgeon: RICK Pollard III, MD;  Location: [facility] OR [address] [place] [address];  Service: Peripheral Vascular;  Laterality: Right;    REVISION, AV FISTULA Right 8/15/2018    Performed by RICK Pollard III, MD at [facility] OR [address] [place] [address]    SIGMOIDECTOMY  2001    TOTAL REDUCTION MAMMOPLASTY Right 2013    TRANSPOSITION, VEIN Right 8/15/2018    Performed by RICK Pollard III, MD at [facility] OR [address] [place] [address]     Family History   Problem Relation Age of Onset    Arthritis Mother     Heart disease Mother     Diabetes Father     Heart disease Father     Celiac disease Sister     Breast cancer Maternal Grandmother         possible-  patient unsure    Cancer Maternal Grandmother     Heart disease Maternal Grandmother     Cancer Maternal Aunt     Ovarian cancer Neg Hx      Social History     Socioeconomic History    Marital status: Single     Spouse name: Not on file    Number of children: 2    Years of education: Not on file    Highest education level: Not on file   Social Needs    Financial resource strain: Not on file    Food insecurity - worry: Not on file    Food insecurity - inability: Not on file    Transportation  "needs - medical: Not on file    Transportation needs - non-medical: Not on file   Occupational History    Occupation: not working     Employer: [email or web address]   Tobacco Use    Smoking status: Current Some Day Smoker     Packs/day: 0.50     Years: 28.00     Pack years: 14.00     Types: Cigarettes     Start date: 1990     Last attempt to quit: 2018     Years since quittin.7    Smokeless tobacco: Never Used    Tobacco comment: anxious   Substance and Sexual Activity    Alcohol use: No    Drug use: No    Sexual activity: No     Partners: Male   Other Topics Concern    Not on file   Social History Narrative    She is not exercising regularly       Current Outpatient Medications:     acetaminophen-codeine 300-30mg (TYLENOL #3) 300-30 mg Tab, Take 1 tablet by mouth every 6 (six) hours as needed., Disp: 90 tablet, Rfl: 2    albuterol 90 mcg/actuation inhaler, Inhale 2 puffs into the lungs every 6 (six) hours as needed for Wheezing. Rescue, Disp: 18 g, Rfl: 0    amLODIPine (NORVASC) 10 MG tablet, Take 1 tablet (10 mg total) by mouth every morning., Disp: 90 tablet, Rfl: 3    atorvastatin (LIPITOR) 40 MG tablet, Take 1 tablet (40 mg total) by mouth once daily., Disp: 90 tablet, Rfl: 3    BD INSULIN PEN NEEDLE UF MINI 31 gauge x 3/16" Ndle, USE 5 NEEDLES PER DAY, Disp: 450 each, Rfl: 2    bumetanide (BUMEX) 1 MG tablet, Take 1 tablet (1 mg total) by mouth 2 (two) times daily., Disp: 180 tablet, Rfl: 3    calcium acetate (PHOSLO) 667 mg tablet, Take 2 tablets by mouth 3 (three) times daily with meals., Disp: 540 tablet, Rfl: 3    carvedilol (COREG) 25 MG tablet, Take 1 tablet (25 mg total) by mouth 2 (two) times daily., Disp: 180 tablet, Rfl: 3    citalopram (CELEXA) 20 MG tablet, Take 1 tablet (20 mg total) by mouth nightly., Disp: 90 tablet, Rfl: 3    ergocalciferol (ERGOCALCIFEROL) 50,000 unit Cap, Take 50,000 Units by mouth every 7 days. Takes on , Disp: , Rfl:     fish oil-omega-3 " "fatty acids 300-1,000 mg capsule, Take 1 capsule by mouth every morning., Disp: , Rfl:     gabapentin (NEURONTIN) 400 MG capsule, Take 1 capsule (400 mg total) by mouth every evening., Disp: 90 capsule, Rfl: 3    hydrALAZINE (APRESOLINE) 25 MG tablet, Take 1 tablet (25 mg total) by mouth 2 (two) times daily., Disp: 180 tablet, Rfl: 3    insulin detemir U-100 (LEVEMIR FLEXTOUCH U-100 INSULN) 100 unit/mL (3 mL) SubQ InPn pen, Inject 45 Units into the skin 2 (two) times daily., Disp: 30 mL, Rfl: 3    insulin lispro (HUMALOG KWIKPEN INSULIN) 100 unit/mL InPn pen, BLOOD GLUCOSE 150-200, INJECT 1 UNITS UNDER THE SKIN, 201-250, 2 UNITS, 251-300, 3 UNITS THREE TIMES A DAY AS DIRECTED, Disp: 1 Box, Rfl: 3    insulin needles, disposable, (PEN NEEDLE, DIABETIC) 31 Ndle, Patient needs 5 needles a day, Disp: 500 each, Rfl: 3    lancets (ONETOUCH DELICA LANCETS) 33 gauge Misc, 1 lancet by Misc.(Non-Drug; Combo Route) route 4 (four) times daily before meals and nightly., Disp: 400 each, Rfl: 4    letrozole (FEMARA) 2.5 mg Tab, Take 1 tablet (2.5 mg total) by mouth nightly., Disp: 90 tablet, Rfl: 3    lidocaine (LIDODERM) 5 %, Place 1 patch onto the skin once daily. Apply patch to affected area for 12 hours, then remove for 12 hours., Disp: 30 patch, Rfl: 0    pen needle, diabetic 32 gauge x 5/32" Ndle, 1 Device by Misc.(Non-Drug; Combo Route) route 5 (five) times daily., Disp: 450 each, Rfl: 6    ranitidine (ZANTAC) 150 MG capsule, Take 1 capsule (150 mg total) by mouth nightly. (Patient taking differently: Take 150 mg by mouth nightly as needed. ), Disp: 90 capsule, Rfl: 3  No current facility-administered medications for this visit.     Facility-Administered Medications Ordered in Other Visits:     mupirocin 2 % ointment, , Nasal, On Call Procedure, Casey Owens MD    REVIEW OF SYSTEMS:  General: negative; ENT: negative; Allergy and Immunology: negative; Hematological and Lymphatic: Negative; Endocrine: negative; " Respiratory: no cough, shortness of breath, or wheezing; Cardiovascular: no chest pain or dyspnea on exertion; Gastrointestinal: no abdominal pain/back, change in bowel habits, or bloody stools; Genito-Urinary: no dysuria, trouble voiding, or hematuria; Musculoskeletal: negative  Neurological: no TIA or stroke symptoms; Psychiatric: no nervousness, anxiety or depression.    PHYSICAL EXAM:                General appearance:  Alert, well-appearing, and in no distress.  Oriented to person, place, and time   Neurological: Normal speech, no focal findings noted; CN II - XII grossly intact           Musculoskeletal: Digits/nail without cyanosis/clubbing.  Normal muscle strength/tone.                 Neck: Supple, no significant adenopathy; thyroid is not enlarged                  No carotid bruit can be auscultated                Chest:  Clear to auscultation, no wheezes, rales or rhonchi, symmetric air entry     No use of accessory muscles             Cardiac: Normal rate and regular rhythm, S1 and S2 normal; PMI non-displaced          Abdomen: Soft, nontender, nondistended, no masses or organomegaly     No rebound tenderness noted; bowel sounds normal     No groin adenopathy      Extremities:   Transposed R RC AVF - obese upper arm     Weak thrill - no pulsatility    LAB RESULTS:  Lab Results   Component Value Date    K 5.1 08/03/2018    K 3.7 04/24/2018    K 3.7 04/23/2018    CREATININE 7.5 (H) 08/03/2018    CREATININE 3.74 (H) 04/24/2018    CREATININE 4.47 (H) 04/23/2018     Lab Results   Component Value Date    WBC 7.19 08/03/2018    WBC 6.91 04/24/2018    WBC 8.18 04/23/2018    HCT 37.1 08/03/2018    HCT 30.0 (L) 04/24/2018    HCT 31.4 (L) 04/23/2018     08/03/2018     04/24/2018     04/23/2018     Lab Results   Component Value Date    HGBA1C 6.8 (H) 04/19/2018    HGBA1C 8.1 (H) 01/29/2018    HGBA1C 10.4 (H) 03/17/2017     IMAGING:  Access US:  Flow vol 602 ml/min  No obvious  stenosis    Previous:  No stenosis, flow rate low 607 (prior 273)  Diameter 7.8 prox, 6.1 mid    IMP/PLAN:  62 y.o. female with  h/o DMII, stage V CKD on HD breast cancer, choledocholithiasis, HTN, HLD, h/o intracerebral hemorrhage - with inability to use R RC AVF; this has been used twice but none recently.    S/p - all by Dr Pollard: R RC VF creation May 2018, transposed in Aug 2018; PTA mid-stenosis Sept 2018 w 7mm balloon  I looked at the previous angios--- it is possible there is an anastomotic stenosis, which is rx w PTA (4 to 5mm), may markedly improve flow vol.  One technical issue is the space in distal R radial artery - it is small and may not be adequate for a R trans-radial approach so this may need to be done thru an antegrade approach.  Long d/w pt and ; they will come back next Fri to d/w Dr Pollard (she already had appt) but will tentatively book today for next Mon 10/22/18  Start ASA 81 po qd    Ryan Barrios MD FACS  Vascular/Endovascular Surgery

## 2018-10-17 ENCOUNTER — TELEPHONE (OUTPATIENT)
Dept: INTERNAL MEDICINE | Facility: CLINIC | Age: 63
End: 2018-10-17

## 2018-10-19 ENCOUNTER — OFFICE VISIT (OUTPATIENT)
Dept: PODIATRY | Facility: CLINIC | Age: 63
End: 2018-10-19
Payer: MEDICARE

## 2018-10-19 ENCOUNTER — OFFICE VISIT (OUTPATIENT)
Dept: VASCULAR SURGERY | Facility: CLINIC | Age: 63
End: 2018-10-19
Payer: MEDICARE

## 2018-10-19 VITALS
HEART RATE: 72 BPM | WEIGHT: 260 LBS | HEIGHT: 64 IN | DIASTOLIC BLOOD PRESSURE: 69 MMHG | TEMPERATURE: 99 F | SYSTOLIC BLOOD PRESSURE: 111 MMHG | BODY MASS INDEX: 44.39 KG/M2

## 2018-10-19 VITALS
BODY MASS INDEX: 43.32 KG/M2 | SYSTOLIC BLOOD PRESSURE: 120 MMHG | HEIGHT: 65 IN | WEIGHT: 260 LBS | HEART RATE: 75 BPM | DIASTOLIC BLOOD PRESSURE: 62 MMHG

## 2018-10-19 DIAGNOSIS — B35.1 ONYCHOMYCOSIS DUE TO DERMATOPHYTE: ICD-10-CM

## 2018-10-19 DIAGNOSIS — L84 CORN OR CALLUS: ICD-10-CM

## 2018-10-19 DIAGNOSIS — Z99.2 ESRD (END STAGE RENAL DISEASE) ON DIALYSIS: Primary | ICD-10-CM

## 2018-10-19 DIAGNOSIS — N18.6 ESRD (END STAGE RENAL DISEASE) ON DIALYSIS: Primary | ICD-10-CM

## 2018-10-19 DIAGNOSIS — L85.3 DRY SKIN: ICD-10-CM

## 2018-10-19 PROBLEM — T82.858A STENOSIS OF ARTERIOVENOUS DIALYSIS FISTULA: Status: RESOLVED | Noted: 2018-08-31 | Resolved: 2018-10-19

## 2018-10-19 PROBLEM — N17.9 AKI (ACUTE KIDNEY INJURY): Status: RESOLVED | Noted: 2018-04-19 | Resolved: 2018-10-19

## 2018-10-19 PROCEDURE — 99999 PR PBB SHADOW E&M-EST. PATIENT-LVL III: CPT | Mod: PBBFAC,,, | Performed by: SURGERY

## 2018-10-19 PROCEDURE — 99999 PR PBB SHADOW E&M-EST. PATIENT-LVL III: CPT | Mod: PBBFAC,,, | Performed by: PODIATRIST

## 2018-10-19 PROCEDURE — 11721 DEBRIDE NAIL 6 OR MORE: CPT | Mod: 59,Q9,S$PBB, | Performed by: PODIATRIST

## 2018-10-19 PROCEDURE — 99213 OFFICE O/P EST LOW 20 MIN: CPT | Mod: PBBFAC,25 | Performed by: SURGERY

## 2018-10-19 PROCEDURE — 11721 DEBRIDE NAIL 6 OR MORE: CPT | Mod: Q9,PBBFAC | Performed by: PODIATRIST

## 2018-10-19 PROCEDURE — 99024 POSTOP FOLLOW-UP VISIT: CPT | Mod: POP,,, | Performed by: SURGERY

## 2018-10-19 PROCEDURE — 99213 OFFICE O/P EST LOW 20 MIN: CPT | Mod: PBBFAC,27,25 | Performed by: PODIATRIST

## 2018-10-19 PROCEDURE — 11055 PARING/CUTG B9 HYPRKER LES 1: CPT | Mod: Q9,59,PBBFAC | Performed by: PODIATRIST

## 2018-10-19 PROCEDURE — 11055 PARING/CUTG B9 HYPRKER LES 1: CPT | Mod: Q9,S$PBB,, | Performed by: PODIATRIST

## 2018-10-19 PROCEDURE — 99499 UNLISTED E&M SERVICE: CPT | Mod: S$PBB,,, | Performed by: PODIATRIST

## 2018-10-19 RX ORDER — AMMONIUM LACTATE 12 G/100G
CREAM TOPICAL
Qty: 1 BOTTLE | Refills: 3 | Status: SHIPPED | OUTPATIENT
Start: 2018-10-19 | End: 2020-08-04

## 2018-10-19 NOTE — PROGRESS NOTES
Ref: CLEMENTINA Siegel    HISTORY OF PRESENT ILLNESS:  A 63-year-old female with new end-stage renal   disease, dialyzing via right IJ PermCath .  She   is here for consideration of permanent dialysis access.  She is right-handed.    She has no history of AICD or pacemaker placement.    It sounds as though her renal failure came on rather suddenly and was   hospitalized for some time.  She is still using supplemental oxygen, but she is   feeling better and can now lie flat.    Now s/p    1. PTA, R AVF 9/12/18  2. Transposition, R AVF 8/15/18  3. Diagnostic fistulagram 7/18/18  4. R lincoln AVF 5/28/18.     She has now used the R AVF exclusively x 1 week with plans to increase needle size at next HD    PAST MEDICAL HISTORY:  1.  History of breast cancer.  2.  History of choledocholithiasis.  3.  Hypertension.  4.  Hyperlipidemia.  5.  History of intracerebral hemorrhage.  6.  History of pancreatitis.  7.  History of stroke.  8.  Type 2 diabetes.  9. ESRD dialyzing Tue/Thur/Sat  10. Severe COPD on home o2    PAST SURGICAL HISTORY:  1.  Sigmoidectomy.  2.  Cholecystectomy.  3.  Nephrectomy.  4.  Breast biopsies.    FAMILY HISTORY:  Positive for diabetes and heart disease.    SOCIAL HISTORY:  Current smoker.    MEDICATIONS:  No anticoagulants or antiplatelet agents.  See EPIC for full list.    ALLERGIES:  KEFLEX, ERYTHROMYCIN AND CYCLOBENZAPRINE.    REVIEW OF SYSTEMS:  Denies postprandial pain or DVT.  All other systems   including eyes, ENT, respiratory, musculoskeletal, skin, psychiatric, heme,   lymph, allergy and immune are negative.    PHYSICAL EXAMINATION:  VITAL SIGNS:  See nursing note.  GENERAL:  She appears somewhat chronically ill using supplemental oxygen.    Resume normal effort.  Clear to auscultation.  CARDIAC:  Regular rate and rhythm.  Nondisplaced PMI, no murmur.  VASCULAR:  2+ radial and brachial pulses.  EXTREMITIES:  Her arms are somewhat generous in girth.  R arm shows good  Thrill proximally, soft thrill in  mid  with modest pulsatility.  Strong 2+ radial proximal to anastomosis which is quite distal     IMAGING ~ 2 wks ago.  No stenosis, flow rate low 607 (prior 273(    Diameter 7.8 prox, 6.1 mid,    Fistualgrams re-reviewed.   Small radial artery with large cephalic vein.   Do not feel there is a arterial stenosis    ASSESSMENT:   Functioning R AVF with successful use x 1 wk    PLAN:  1. FU 2 wks for permacath removal if cont use of the AVF    CC: CLEMENTINA Pollard III, MD, FACS  Professor and Chief, Vascular and Endovascular Surgery

## 2018-10-23 NOTE — PROGRESS NOTES
Subjective:      Patient ID: Kylie King is a 63 y.o. female.    Chief Complaint: Diabetes Mellitus (dr foote10/05/2018) and Diabetic Foot Exam    Kylie is a 63 y.o. female who presents to the clinic for evaluation and treatment of high risk feet. Kylie has a past medical history of Anxiety, Arthritis, Breast cancer (1/2013), Carotid artery stenosis (8/13/2018), Choledocholithiasis, Chronic obstructive pulmonary disease (6/8/2018), Chronic venous insufficiency, Colon cancer (2001), CRI (chronic renal insufficiency), Dialysis AV fistula malfunction, ESRD (end stage renal disease) on dialysis, Former smoker (6/8/2018), GERD (gastroesophageal reflux disease), History of acute pancreatitis (2009), History of cerebrovascular accident (6/8/2018), History of colon cancer, HTN (hypertension), benign, Hypercholesterolemia, Hyperlipidemia, Hypoxemia (6/8/2018), Intracerebral hemorrhage, Kidney stone, Lymphedema of both lower extremities, Obesity, Pancreatitis, Pancreatitis (2008), Physical deconditioning, Pulmonary edema, Sacroiliac joint pain, Spinal stenosis, lumbar, Stroke (12/2012), Tobacco abuse, and Type 2 diabetes mellitus with neurological manifestations, controlled. The patient's chief complaint is  elongated toenails       PCP: Virginia Foote MD    Date Last Seen by PCP: dr foote10/05/2018  Current shoe gear:  Affected Foot: Tennis shoes     Unaffected Foot: Tennis shoes    History of Trauma: negative  Sign of Infection: none    Hemoglobin A1C   Date Value Ref Range Status   04/19/2018 6.8 (H) 4.0 - 5.6 % Final     Comment:     According to ADA guidelines, hemoglobin A1c <7.0% represents  optimal control in non-pregnant diabetic patients. Different  metrics may apply to specific patient populations.   Standards of Medical Care in Diabetes-2016.  For the purpose of screening for the presence of diabetes:  <5.7%     Consistent with the absence of diabetes  5.7-6.4%  Consistent with increasing risk for  diabetes   (prediabetes)  >or=6.5%  Consistent with diabetes  Currently, no consensus exists for use of hemoglobin A1c  for diagnosis of diabetes for children.  This Hemoglobin A1c assay has significant interference with fetal   hemoglobin   (HbF). The results are invalid for patients with abnormal amounts of   HbF,   including those with known Hereditary Persistence   of Fetal Hemoglobin. Heterozygous hemoglobin variants (HbAS, HbAC,   HbAD, HbAE, HbA2) do not significantly interfere with this assay;   however, presence of multiple variants in a sample may impact the %   interference.     01/29/2018 8.1 (H) 4.0 - 5.6 % Final     Comment:     According to ADA guidelines, hemoglobin A1c <7.0% represents  optimal control in non-pregnant diabetic patients. Different  metrics may apply to specific patient populations.   Standards of Medical Care in Diabetes-2016.  For the purpose of screening for the presence of diabetes:  <5.7%     Consistent with the absence of diabetes  5.7-6.4%  Consistent with increasing risk for diabetes   (prediabetes)  >or=6.5%  Consistent with diabetes  Currently, no consensus exists for use of hemoglobin A1c  for diagnosis of diabetes for children.  This Hemoglobin A1c assay has significant interference with fetal   hemoglobin   (HbF). The results are invalid for patients with abnormal amounts of   HbF,   including those with known Hereditary Persistence   of Fetal Hemoglobin. Heterozygous hemoglobin variants (HbAS, HbAC,   HbAD, HbAE, HbA2) do not significantly interfere with this assay;   however, presence of multiple variants in a sample may impact the %   interference.     03/17/2017 10.4 (H) 4.5 - 6.2 % Final     Comment:     According to ADA guidelines, hemoglobin A1C <7.0% represents  optimal control in non-pregnant diabetic patients.  Different  metrics may apply to specific populations.   Standards of Medical Care in Diabetes - 2016.  For the purpose of screening for the presence of  diabetes:  <5.7%     Consistent with the absence of diabetes  5.7-6.4%  Consistent with increasing risk for diabetes   (prediabetes)  >or=6.5%  Consistent with diabetes  Currently no consensus exists for use of hemoglobin A1C  for diagnosis of diabetes for children.         Review of Systems   Constitution: Negative for chills, fever and malaise/fatigue.   HENT: Negative for hearing loss.    Cardiovascular: Positive for leg swelling. Negative for claudication.   Respiratory: Negative for shortness of breath.    Skin: Positive for color change, dry skin, nail changes and unusual hair distribution. Negative for flushing and rash.   Musculoskeletal: Positive for joint pain. Negative for myalgias.   Neurological: Positive for numbness, paresthesias and sensory change.   Psychiatric/Behavioral: Negative for altered mental status.           Objective:      Physical Exam   Constitutional: She is oriented to person, place, and time. She appears well-developed and well-nourished. She is cooperative.   Cardiovascular:   Pulses:       Dorsalis pedis pulses are 0 on the right side, and 0 on the left side.        Posterior tibial pulses are 1+ on the right side, and 1+ on the left side.   Non-pitting edema noted to b/L LEs   Musculoskeletal: She exhibits edema and tenderness.        Right knee: She exhibits no swelling and no ecchymosis.        Left knee: She exhibits no swelling and no ecchymosis.        Right ankle: She exhibits normal range of motion, no swelling, no ecchymosis and normal pulse. No lateral malleolus, no medial malleolus and no head of 5th metatarsal tenderness found. Achilles tendon exhibits no pain, no defect and normal Ortiz's test results.        Left ankle: She exhibits normal range of motion, no swelling, no ecchymosis and normal pulse. No lateral malleolus, no medial malleolus and no head of 5th metatarsal tenderness found. Achilles tendon exhibits no pain and normal Ortiz's test results.         Right lower leg: She exhibits no tenderness, no bony tenderness, no swelling, no edema and no deformity.        Left lower leg: She exhibits no tenderness, no swelling and no edema.        Right foot: There is normal range of motion and no deformity.        Left foot: There is normal range of motion and no deformity.   Decreased ROM with out joint pain nor crepitation to bilateral feet and ankle joints.  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 0 sites sensed.   Skin Integrity: Positive for ulcer.   Left Foot:   Protective Sensation: 5 sites tested. 0 sites sensed.   Skin Integrity: Positive for dry skin.   Neurological: She is alert and oriented to person, place, and time. A sensory deficit is present.   Absent protective sensation b/L   Skin: Skin is warm and dry. Capillary refill takes more than 3 seconds. No abrasion, no bruising, no burn and no ecchymosis noted.     Nails x9 are elongated by  4-5mm's, thickened by 2-3 mm's, dystrophic, and are yellowish in  coloration . Xerosis Bilaterally.     No open lesions b/L  HKL noted to left 4th digit   Psychiatric: She has a normal mood and affect. Her speech is normal. She is slowed. She is attentive.   Vitals reviewed.            Assessment:       Encounter Diagnoses   Name Primary?    DM type 2, uncontrolled, with neuropathy Yes    Onychomycosis due to dermatophyte     Corn or callus     Dry skin          Plan:       Kylie was seen today for diabetes mellitus and diabetic foot exam.    Diagnoses and all orders for this visit:    DM type 2, uncontrolled, with neuropathy    Onychomycosis due to dermatophyte    Corn or callus    Dry skin    Other orders  -     ammonium lactate 12 % Crea; Apply small amount to feet except for in between toes twice a day      I counseled the patient on her conditions, their implications and medical management.    With the patient's  permission, nails were aggressively reduced and debrided X 9 to their soft tissue attachment mechanically and with an electric  removing all offending nail and debris. Pt relates relief following the procedure. She will continue to monitor the areas daily, inspect her feet, wear protective shoe gear when ambulating, moisturized daily to maintain skin integrity and follow up in the office in 3 months.     Diabetic foot exam performed on pt. Pt advised on daily monitoring and moisturizing of the feet and keeping the webspaces clean and dry Patient advised to contact the clinic if any new pedal problems should arise.   After cleansing with an alcohol prep pad, the about mentioned hyperkeratotic lesions were sharply debrided X 1 utilizing a #15 blade to a smooth base without incident. Pt tolerated the procedure well and reported comfort to the debarment sites. Pt will continue to use padding and moisture the callused areas.     Rx Nadeem lact for dry skin    .

## 2018-11-09 ENCOUNTER — OFFICE VISIT (OUTPATIENT)
Dept: VASCULAR SURGERY | Facility: CLINIC | Age: 63
End: 2018-11-09
Payer: MEDICARE

## 2018-11-09 VITALS
WEIGHT: 260 LBS | HEART RATE: 74 BPM | BODY MASS INDEX: 44.39 KG/M2 | HEIGHT: 64 IN | SYSTOLIC BLOOD PRESSURE: 146 MMHG | TEMPERATURE: 99 F | DIASTOLIC BLOOD PRESSURE: 89 MMHG

## 2018-11-09 DIAGNOSIS — Z99.2 ESRD (END STAGE RENAL DISEASE) ON DIALYSIS: Primary | ICD-10-CM

## 2018-11-09 DIAGNOSIS — N18.6 ESRD (END STAGE RENAL DISEASE) ON DIALYSIS: Primary | ICD-10-CM

## 2018-11-09 PROCEDURE — 99999 PR PBB SHADOW E&M-EST. PATIENT-LVL III: CPT | Mod: PBBFAC,,, | Performed by: SURGERY

## 2018-11-09 PROCEDURE — 99499 UNLISTED E&M SERVICE: CPT | Mod: S$PBB,,, | Performed by: SURGERY

## 2018-11-09 PROCEDURE — 36589 REMOVAL TUNNELED CV CATH: CPT | Mod: S$PBB,79,, | Performed by: SURGERY

## 2018-11-09 PROCEDURE — 99213 OFFICE O/P EST LOW 20 MIN: CPT | Mod: PBBFAC,25 | Performed by: SURGERY

## 2018-11-09 PROCEDURE — 36589 REMOVAL TUNNELED CV CATH: CPT | Mod: PBBFAC | Performed by: SURGERY

## 2018-11-09 NOTE — PROGRESS NOTES
Ref: CLEMENTINA Siegel    HISTORY OF PRESENT ILLNESS:  A 63-year-old female with new end-stage renal   disease, dialyzing via right IJ PermCath .  She   is here for consideration of permanent dialysis access.  She is right-handed.    She has no history of AICD or pacemaker placement.    It sounds as though her renal failure came on rather suddenly and was   hospitalized for some time.  She is still using supplemental oxygen, but she is   feeling better and can now lie flat.    Now s/p    1. PTA, R AVF 9/12/18  2. Transposition, R AVF 8/15/18  3. Diagnostic fistulagram 7/18/18  4. R lincoln AVF 5/28/18.     She has now used the R AVF exclusively x 3 week    PAST MEDICAL HISTORY:  1.  History of breast cancer.  2.  History of choledocholithiasis.  3.  Hypertension.  4.  Hyperlipidemia.  5.  History of intracerebral hemorrhage.  6.  History of pancreatitis.  7.  History of stroke.  8.  Type 2 diabetes.  9. ESRD dialyzing Tue/Thur/Sat  10. Severe COPD on home o2    PAST SURGICAL HISTORY:  1.  Sigmoidectomy.  2.  Cholecystectomy.  3.  Nephrectomy.  4.  Breast biopsies.    FAMILY HISTORY:  Positive for diabetes and heart disease.    SOCIAL HISTORY:  Current smoker.    MEDICATIONS:  No anticoagulants or antiplatelet agents.  See EPIC for full list.    ALLERGIES:  KEFLEX, ERYTHROMYCIN AND CYCLOBENZAPRINE.    REVIEW OF SYSTEMS:  Denies postprandial pain or DVT.  All other systems   including eyes, ENT, respiratory, musculoskeletal, skin, psychiatric, heme,   lymph, allergy and immune are negative.    PHYSICAL EXAMINATION:  VITAL SIGNS:  See nursing note.  GENERAL:  She appears somewhat chronically ill using supplemental oxygen.    Resume normal effort.  Clear to auscultation.  CARDIAC:  Regular rate and rhythm.  Nondisplaced PMI, no murmur.  VASCULAR:  2+ radial and brachial pulses.  EXTREMITIES:  Her arms are somewhat generous in girth.  R arm shows good  Thrill proximally, soft thrill in mid  with modest pulsatility.  Strong 2+  radial proximal to anastomosis which is quite distal     IMAGING ~ 4 wks ago.  No stenosis, flow rate low 607 (prior 273(    Diameter 7.8 prox, 6.1 mid,    Fistualgrams re-reviewed.   Small radial artery with large cephalic vein.   Do not feel there is a arterial stenosis    ASSESSMENT:   Functioning R AVF with successful use x 3 wk    PLAN:  1. permacath removal today  2. 10 wk f/u with AVF duplex if clinically indicated    PROCEDURE NOTE:   After sterile prep and drape and infiltration with lidocaine, the permacath cuff was freed up using blunt and sharp dissection, the permacath removed, and manual compression used for hemostasis.   No complications    CC: CLEMENTINA Pollard III, MD, FACS  Professor and Chief, Vascular and Endovascular Surgery

## 2018-11-21 ENCOUNTER — TELEPHONE (OUTPATIENT)
Dept: ADMINISTRATIVE | Facility: HOSPITAL | Age: 63
End: 2018-11-21

## 2018-11-26 ENCOUNTER — PATIENT MESSAGE (OUTPATIENT)
Dept: INTERNAL MEDICINE | Facility: CLINIC | Age: 63
End: 2018-11-26

## 2018-11-26 ENCOUNTER — TELEPHONE (OUTPATIENT)
Dept: INTERNAL MEDICINE | Facility: CLINIC | Age: 63
End: 2018-11-26

## 2018-11-28 ENCOUNTER — TELEPHONE (OUTPATIENT)
Dept: INTERNAL MEDICINE | Facility: CLINIC | Age: 63
End: 2018-11-28

## 2018-11-28 DIAGNOSIS — I50.9 CONGESTIVE HEART FAILURE, UNSPECIFIED HF CHRONICITY, UNSPECIFIED HEART FAILURE TYPE: Primary | ICD-10-CM

## 2018-11-30 ENCOUNTER — PATIENT MESSAGE (OUTPATIENT)
Dept: INTERNAL MEDICINE | Facility: CLINIC | Age: 63
End: 2018-11-30

## 2018-12-03 ENCOUNTER — PATIENT MESSAGE (OUTPATIENT)
Dept: INTERNAL MEDICINE | Facility: CLINIC | Age: 63
End: 2018-12-03

## 2018-12-03 ENCOUNTER — TELEPHONE (OUTPATIENT)
Dept: INTERNAL MEDICINE | Facility: CLINIC | Age: 63
End: 2018-12-03

## 2018-12-03 NOTE — TELEPHONE ENCOUNTER
----- Message from Gisel Black MA sent at 12/3/2018  4:24 PM CST -----   Please advise   ----- Message -----  From: Fide Nolan  Sent: 12/3/2018   3:24 PM  To: [facility] Staff    Dr Foote entered an order for oxygen for home use.  I faxed order on Friday.  They just contacted me to let me know that because of patient's insurance she needs updated clinic notes and 6MW testing.  Once complete we can resubmit DME request.    Thanks, Karime

## 2018-12-07 ENCOUNTER — TELEPHONE (OUTPATIENT)
Dept: INTERNAL MEDICINE | Facility: CLINIC | Age: 63
End: 2018-12-07

## 2018-12-07 DIAGNOSIS — I50.9 CONGESTIVE HEART FAILURE, UNSPECIFIED HF CHRONICITY, UNSPECIFIED HEART FAILURE TYPE: Primary | ICD-10-CM

## 2018-12-10 ENCOUNTER — HOSPITAL ENCOUNTER (OUTPATIENT)
Dept: PULMONOLOGY | Facility: CLINIC | Age: 63
Discharge: HOME | End: 2018-12-10
Payer: MEDICARE

## 2018-12-10 VITALS — WEIGHT: 260 LBS | HEIGHT: 64 IN | BODY MASS INDEX: 44.39 KG/M2

## 2018-12-10 DIAGNOSIS — I50.9 CONGESTIVE HEART FAILURE, UNSPECIFIED HF CHRONICITY, UNSPECIFIED HEART FAILURE TYPE: ICD-10-CM

## 2018-12-10 PROCEDURE — 94618 PULMONARY STRESS TESTING: CPT | Mod: PBBFAC | Performed by: INTERNAL MEDICINE

## 2018-12-10 PROCEDURE — 94618 PULMONARY STRESS TESTING: CPT | Mod: 26,S$PBB,, | Performed by: INTERNAL MEDICINE

## 2018-12-11 NOTE — PROCEDURES
Kylie King is a 63 y.o.  female patient, who presents for a 6 minute walk test ordered by Dary Chan.  The diagnosis is Qualify for Oxygen.  The patient's BMI is 44.7 kg/m2.  Predicted distance (lower limit of normal) is 225.95 meters.      Test Results:    The test was not completed.  The patient stopped 2 times for a total of 130 seconds.  The total time walked was 230 seconds.  During walking, the patient reported:  Dyspnea, Leg pain, Lightheadedness. The patient used a wheelchair  during testing.     12/10/2018---------Distance: 60.96 meters (200 feet)     O2 Sat % Supplemental Oxygen Heart Rate Blood Pressure Becca Scale   Pre-exercise  (Resting) 97 % Room Air 88 bpm 174/87 mmHg 3   During Exercise 96 % Room Air 108 bpm 132/80 mmHg 4   Post-exercise  (Recovery) 97 % Room Air  96 bpm   mmHg       Recovery Time: 72 seconds    Performing nurse/tech: Ayala WILSON      PREVIOUS STUDY:   The patient has not had a previous study.      CLINICAL INTERPRETATION:  Six minute walk distance is 60.96 meters (200 feet) with somewhat heavy dyspnea.  During exercise, there was no significant desaturation while breathing room air.  Blood pressure decreased significantly and Heart rate remained stable with walking.  Hypertension was present prior to exercise.  This may represent an abnormal cardiovascular response to exercise.  The patient reported non-pulmonary symptoms during exercise.  Severe exercise impairment is likely due to cardiovascular causes.  The patient did not complete the study, walking 230 seconds of the 360 second test.  No previous study performed.  Based upon age and body mass index, exercise capacity is less than predicted.

## 2018-12-12 ENCOUNTER — OFFICE VISIT (OUTPATIENT)
Dept: OPHTHALMOLOGY | Facility: CLINIC | Age: 63
End: 2018-12-12
Payer: MEDICARE

## 2018-12-12 DIAGNOSIS — H25.13 NUCLEAR SCLEROSIS, BILATERAL: Primary | ICD-10-CM

## 2018-12-12 PROCEDURE — 99213 OFFICE O/P EST LOW 20 MIN: CPT | Mod: PBBFAC | Performed by: OPHTHALMOLOGY

## 2018-12-12 PROCEDURE — 92136 OPHTHALMIC BIOMETRY: CPT | Mod: 26,S$PBB,RT, | Performed by: OPHTHALMOLOGY

## 2018-12-12 PROCEDURE — 99999 PR PBB SHADOW E&M-EST. PATIENT-LVL III: CPT | Mod: PBBFAC,,, | Performed by: OPHTHALMOLOGY

## 2018-12-12 PROCEDURE — 92136 OPHTHALMIC BIOMETRY: CPT | Mod: PBBFAC

## 2018-12-12 PROCEDURE — 92014 COMPRE OPH EXAM EST PT 1/>: CPT | Mod: S$PBB,,, | Performed by: OPHTHALMOLOGY

## 2018-12-12 RX ORDER — PREDNISOLONE ACETATE-GATIFLOXACIN-BROMFENAC .75; 5; 1 MG/ML; MG/ML; MG/ML
1 SUSPENSION/ DROPS OPHTHALMIC 3 TIMES DAILY
Qty: 7 ML | Refills: 3 | Status: SHIPPED | OUTPATIENT
Start: 2018-12-12 | End: 2019-03-07 | Stop reason: ALTCHOICE

## 2018-12-12 RX ORDER — TETRACAINE HYDROCHLORIDE 5 MG/ML
1 SOLUTION OPHTHALMIC
Status: CANCELLED | OUTPATIENT
Start: 2018-12-12

## 2018-12-12 RX ORDER — MOXIFLOXACIN 5 MG/ML
1 SOLUTION/ DROPS OPHTHALMIC
Status: CANCELLED | OUTPATIENT
Start: 2018-12-12

## 2018-12-12 RX ORDER — TROPICAMIDE 10 MG/ML
1 SOLUTION/ DROPS OPHTHALMIC
Status: CANCELLED | OUTPATIENT
Start: 2018-12-12

## 2018-12-12 RX ORDER — PHENYLEPHRINE HYDROCHLORIDE 25 MG/ML
1 SOLUTION/ DROPS OPHTHALMIC
Status: CANCELLED | OUTPATIENT
Start: 2018-12-12

## 2018-12-12 NOTE — PROGRESS NOTES
HPI     Pt here for cataract eval referred by Dr. Hurst OD. Pt states she   received new rx for her glasses but was advised not to fill them til after   cat sx. Pt states she has trouble focusing and words seem to be all over   the place. Pt denies eye pain, flashes, floaters. Pt is not currently   using eye gtt.     Last edited by Ambika Sharma on 12/12/2018  1:05 PM. (History)            Assessment /Plan     For exam results, see Encounter Report.    Nuclear sclerosis, bilateral      Visually Significant Cataract: Patient reports decreased vision consistent with the clinical amount of lenticular opacity, which reaches the level of visual significance and affects activities of daily living. Risks, benefits, and alternatives to cataract surgery were discussed and the consent reviewed. IOL options were discussed, including ATIOLs and the associated side effects and additional patient cost associated with them.   IOL Selections:   Right eye  IOL: pcboo 24.0     Left eye  IOL: pcboo 24.5    Pt wishes to have right eye done first.  Declines flacs

## 2018-12-13 ENCOUNTER — TELEPHONE (OUTPATIENT)
Dept: OPHTHALMOLOGY | Facility: CLINIC | Age: 63
End: 2018-12-13

## 2018-12-13 DIAGNOSIS — H25.12 NUCLEAR SCLEROTIC CATARACT OF LEFT EYE: Primary | ICD-10-CM

## 2018-12-13 DIAGNOSIS — H25.11 NUCLEAR SCLEROTIC CATARACT OF RIGHT EYE: Primary | ICD-10-CM

## 2018-12-28 ENCOUNTER — PATIENT MESSAGE (OUTPATIENT)
Dept: INTERNAL MEDICINE | Facility: CLINIC | Age: 63
End: 2018-12-28

## 2018-12-28 ENCOUNTER — TELEPHONE (OUTPATIENT)
Dept: INTERNAL MEDICINE | Facility: CLINIC | Age: 63
End: 2018-12-28

## 2018-12-28 NOTE — TELEPHONE ENCOUNTER
----- Message from Lianet Degroot sent at 12/28/2018 10:41 AM CST -----  Contact: [phone]  PT would like a call back in regards to her Oxygen. Pt would like to know if she is being taken off of Oxygen or getting a smaller tank. Please call and advise.

## 2018-12-31 ENCOUNTER — PATIENT MESSAGE (OUTPATIENT)
Dept: INTERNAL MEDICINE | Facility: CLINIC | Age: 63
End: 2018-12-31

## 2018-12-31 ENCOUNTER — TELEPHONE (OUTPATIENT)
Dept: INTERNAL MEDICINE | Facility: CLINIC | Age: 63
End: 2018-12-31

## 2018-12-31 NOTE — TELEPHONE ENCOUNTER
Patient called and was advised of the results she verbalized understanding she was not exactly pleased before she termed the call she advise she will not be Using the Oxygen. PCP is aware

## 2018-12-31 NOTE — TELEPHONE ENCOUNTER
Please advise patient that if she is tolerating being off oxygen, we can discontinue it since her oxygenation was good on the six 6 min walk.

## 2019-01-02 ENCOUNTER — TELEPHONE (OUTPATIENT)
Dept: INTERNAL MEDICINE | Facility: CLINIC | Age: 64
End: 2019-01-02

## 2019-01-02 NOTE — TELEPHONE ENCOUNTER
----- Message from Gisel Black MA sent at 1/2/2019 11:18 AM CST -----  FYI  Patient has advised she will d/c her O2 she states she only uses it as needed when she has exerted herself. She advised she does not use it constantly and she only uses as needed. Please advise/ Thanks

## 2019-01-04 ENCOUNTER — TELEPHONE (OUTPATIENT)
Dept: VASCULAR SURGERY | Facility: CLINIC | Age: 64
End: 2019-01-04

## 2019-01-04 NOTE — TELEPHONE ENCOUNTER
Patient states she is returning nurse's phone call. Notified patient her appts with Dr. Pollard have been rescheduled to 1/15/19. Pt states this will  Be fine.

## 2019-01-04 NOTE — TELEPHONE ENCOUNTER
Attempted to contact patient and ex- Henry to reschedule appt to Tuesday 1/15/19 as patient's dialysis days have changed since appt was scheduled. Voice messages left for patient and for Henry. Will reattempt.

## 2019-01-09 ENCOUNTER — TELEPHONE (OUTPATIENT)
Dept: OPHTHALMOLOGY | Facility: CLINIC | Age: 64
End: 2019-01-09

## 2019-01-09 NOTE — TELEPHONE ENCOUNTER
----- Message from Roya Kuo sent at 1/9/2019  2:25 PM CST -----  Contact: Kylie Patton   Pt would like to speak with  nurse please about the eye drops ,pt can be reached at [phone] please thank you.

## 2019-01-15 ENCOUNTER — HOSPITAL ENCOUNTER (OUTPATIENT)
Dept: VASCULAR SURGERY | Facility: CLINIC | Age: 64
Discharge: HOME | End: 2019-01-15
Attending: SURGERY
Payer: COMMERCIAL

## 2019-01-15 ENCOUNTER — OFFICE VISIT (OUTPATIENT)
Dept: VASCULAR SURGERY | Facility: CLINIC | Age: 64
End: 2019-01-15
Payer: COMMERCIAL

## 2019-01-15 VITALS
HEIGHT: 64 IN | WEIGHT: 262.38 LBS | BODY MASS INDEX: 44.79 KG/M2 | DIASTOLIC BLOOD PRESSURE: 67 MMHG | SYSTOLIC BLOOD PRESSURE: 139 MMHG | HEART RATE: 67 BPM | TEMPERATURE: 99 F

## 2019-01-15 DIAGNOSIS — Z99.2 ESRD (END STAGE RENAL DISEASE) ON DIALYSIS: ICD-10-CM

## 2019-01-15 DIAGNOSIS — N18.6 ESRD (END STAGE RENAL DISEASE) ON DIALYSIS: Primary | ICD-10-CM

## 2019-01-15 DIAGNOSIS — N18.6 ESRD (END STAGE RENAL DISEASE) ON DIALYSIS: ICD-10-CM

## 2019-01-15 DIAGNOSIS — Z99.2 ESRD (END STAGE RENAL DISEASE) ON DIALYSIS: Primary | ICD-10-CM

## 2019-01-15 PROCEDURE — 99999 PR PBB SHADOW E&M-EST. PATIENT-LVL III: ICD-10-PCS | Mod: PBBFAC,,, | Performed by: SURGERY

## 2019-01-15 PROCEDURE — 99214 PR OFFICE/OUTPT VISIT, EST, LEVL IV, 30-39 MIN: ICD-10-PCS | Mod: S$GLB,,, | Performed by: SURGERY

## 2019-01-15 PROCEDURE — 99213 OFFICE O/P EST LOW 20 MIN: CPT | Mod: PBBFAC,25 | Performed by: SURGERY

## 2019-01-15 PROCEDURE — 93990 DOPPLER FLOW TESTING: CPT | Mod: PBBFAC | Performed by: SURGERY

## 2019-01-15 PROCEDURE — 93990 PR DUPLEX HEMODIALYSIS ACCESS: ICD-10-PCS | Mod: S$GLB,,, | Performed by: SURGERY

## 2019-01-15 PROCEDURE — 99999 PR PBB SHADOW E&M-EST. PATIENT-LVL III: CPT | Mod: PBBFAC,,, | Performed by: SURGERY

## 2019-01-15 PROCEDURE — 99214 OFFICE O/P EST MOD 30 MIN: CPT | Mod: S$GLB,,, | Performed by: SURGERY

## 2019-01-15 PROCEDURE — 93990 DOPPLER FLOW TESTING: CPT | Mod: S$GLB,,, | Performed by: SURGERY

## 2019-01-15 NOTE — PROGRESS NOTES
Ref: CLEMENTINA Siegel    HISTORY OF PRESENT ILLNESS:  A 63-year-old female with new end-stage renal   disease,  Here for f/u.  It sounds as though her renal failure came on rather suddenly and was   hospitalized for some time.  She is still using supplemental oxygen, but she is   feeling better and can now lie flat.    Now s/p    1. PTA, R AVF 9/12/18  2. Transposition, R AVF 8/15/18  3. Diagnostic fistulagram 7/18/18  4. R lincoln AVF 5/28/18.     This is a 3 month f/u.  No issues with the AVF    PAST MEDICAL HISTORY:  1.  History of breast cancer.  2.  History of choledocholithiasis.  3.  Hypertension.  4.  Hyperlipidemia.  5.  History of intracerebral hemorrhage.  6.  History of pancreatitis.  7.  History of stroke.  8.  Type 2 diabetes.  9. ESRD dialyzing Tue/Thur/Sat  10. Severe COPD on home o2    PAST SURGICAL HISTORY:  1.  Sigmoidectomy.  2.  Cholecystectomy.  3.  Nephrectomy.  4.  Breast biopsies.    FAMILY HISTORY:  Positive for diabetes and heart disease.    SOCIAL HISTORY:  Current smoker.    MEDICATIONS:  No anticoagulants or antiplatelet agents.  See EPIC for full list.    ALLERGIES:  KEFLEX, ERYTHROMYCIN AND CYCLOBENZAPRINE.    REVIEW OF SYSTEMS:  Denies postprandial pain or DVT.  All other systems   including eyes, ENT, respiratory, musculoskeletal, skin, psychiatric, heme,   lymph, allergy and immune are negative.    PHYSICAL EXAMINATION:  VITAL SIGNS:  See nursing note.  GENERAL:  She appears somewhat chronically ill using supplemental oxygen.    Resume normal effort.  Clear to auscultation.  CARDIAC:  Regular rate and rhythm.  Nondisplaced PMI, no murmur.  VASCULAR:  2+ radial and brachial pulses.  EXTREMITIES:  Her arms are somewhat generous in girth.  R arm shows good  Thrill proximally, soft thrill in mid  with modest pulsatility.  Strong 2+ radial proximal to anastomosis which is quite distal.  stable     IMAGING ~ 4 wks ago.  No stenosis, flow rate low 701 (prior 607 (prior 273(    Fistualgrams  re-reviewed.   Small radial artery with large cephalic vein.   Do not feel there is a arterial stenosis    ASSESSMENT:  Well Functioning R AVF     PLAN:  1. Lengthen surveillance, f/u 4 months with AVF duplex if clinically indicated    CC: CLEMENTINA Pollard III, MD, FACS  Professor and Chief, Vascular and Endovascular Surgery

## 2019-01-18 ENCOUNTER — OFFICE VISIT (OUTPATIENT)
Dept: PODIATRY | Facility: CLINIC | Age: 64
End: 2019-01-18
Payer: COMMERCIAL

## 2019-01-18 VITALS
DIASTOLIC BLOOD PRESSURE: 69 MMHG | WEIGHT: 262 LBS | SYSTOLIC BLOOD PRESSURE: 114 MMHG | HEIGHT: 65 IN | HEART RATE: 80 BPM | BODY MASS INDEX: 43.65 KG/M2

## 2019-01-18 DIAGNOSIS — L84 CORN OR CALLUS: ICD-10-CM

## 2019-01-18 DIAGNOSIS — E11.42 DIABETIC POLYNEUROPATHY ASSOCIATED WITH TYPE 2 DIABETES MELLITUS: Primary | ICD-10-CM

## 2019-01-18 DIAGNOSIS — B35.1 ONYCHOMYCOSIS DUE TO DERMATOPHYTE: ICD-10-CM

## 2019-01-18 PROCEDURE — 99999 PR PBB SHADOW E&M-EST. PATIENT-LVL IV: ICD-10-PCS | Mod: PBBFAC,,, | Performed by: PODIATRIST

## 2019-01-18 PROCEDURE — 11721 PR DEBRIDEMENT OF NAILS, 6 OR MORE: ICD-10-PCS | Mod: 59,S$GLB,, | Performed by: PODIATRIST

## 2019-01-18 PROCEDURE — 99499 UNLISTED E&M SERVICE: CPT | Mod: S$GLB,,, | Performed by: PODIATRIST

## 2019-01-18 PROCEDURE — 99214 OFFICE O/P EST MOD 30 MIN: CPT | Mod: PBBFAC | Performed by: PODIATRIST

## 2019-01-18 PROCEDURE — 99499 NO LOS: ICD-10-PCS | Mod: S$GLB,,, | Performed by: PODIATRIST

## 2019-01-18 PROCEDURE — 99999 PR PBB SHADOW E&M-EST. PATIENT-LVL IV: CPT | Mod: PBBFAC,,, | Performed by: PODIATRIST

## 2019-01-18 PROCEDURE — 11055 PARING/CUTG B9 HYPRKER LES 1: CPT | Mod: Q9,59,PBBFAC | Performed by: PODIATRIST

## 2019-01-18 PROCEDURE — 11055 PARING/CUTG B9 HYPRKER LES 1: CPT | Mod: S$GLB,,, | Performed by: PODIATRIST

## 2019-01-18 PROCEDURE — 11721 DEBRIDE NAIL 6 OR MORE: CPT | Mod: Q9,PBBFAC | Performed by: PODIATRIST

## 2019-01-18 PROCEDURE — 11721 DEBRIDE NAIL 6 OR MORE: CPT | Mod: 59,S$GLB,, | Performed by: PODIATRIST

## 2019-01-18 PROCEDURE — 11055 PR TRIM HYPERKERATOTIC SKIN LESION, ONE: ICD-10-PCS | Mod: S$GLB,,, | Performed by: PODIATRIST

## 2019-01-21 ENCOUNTER — TELEPHONE (OUTPATIENT)
Dept: OPHTHALMOLOGY | Facility: CLINIC | Age: 64
End: 2019-01-21

## 2019-01-21 NOTE — TELEPHONE ENCOUNTER
Left Message giving arrival time for surgery as 8 am. Nothing to eat or drink after 9 pm night before.  May have water/gatorade/powerade from 9 pm until leaves house morning of surgery.

## 2019-01-22 ENCOUNTER — TELEPHONE (OUTPATIENT)
Dept: OPHTHALMOLOGY | Facility: CLINIC | Age: 64
End: 2019-01-22

## 2019-01-22 NOTE — TELEPHONE ENCOUNTER
Patient given arrival time for surgery as 8am.  Nothing to eat or drink after 9 pm night before.  May have water/gatorade/powerade from 9 pm until leaves house morning of surgery.

## 2019-01-22 NOTE — TELEPHONE ENCOUNTER
----- Message from Sandra Dowling sent at 1/22/2019  3:42 PM CST -----  Contact: azra  Ms. King needs you to contact her. She didn't says what it was concerning. She can be reached at [phone]

## 2019-01-24 ENCOUNTER — ANESTHESIA (OUTPATIENT)
Dept: SURGERY | Facility: OTHER | Age: 64
End: 2019-01-24
Payer: COMMERCIAL

## 2019-01-24 ENCOUNTER — ANESTHESIA EVENT (OUTPATIENT)
Dept: SURGERY | Facility: OTHER | Age: 64
End: 2019-01-24
Payer: COMMERCIAL

## 2019-01-24 ENCOUNTER — HOSPITAL ENCOUNTER (OUTPATIENT)
Facility: OTHER | Age: 64
Discharge: HOME | End: 2019-01-24
Attending: OPHTHALMOLOGY | Admitting: OPHTHALMOLOGY
Payer: COMMERCIAL

## 2019-01-24 VITALS
OXYGEN SATURATION: 94 % | DIASTOLIC BLOOD PRESSURE: 69 MMHG | HEART RATE: 76 BPM | SYSTOLIC BLOOD PRESSURE: 150 MMHG | WEIGHT: 264.56 LBS | RESPIRATION RATE: 18 BRPM | TEMPERATURE: 99 F | HEIGHT: 64 IN | BODY MASS INDEX: 45.17 KG/M2

## 2019-01-24 DIAGNOSIS — H25.13 NUCLEAR SCLEROSIS, BILATERAL: ICD-10-CM

## 2019-01-24 DIAGNOSIS — Z99.2 ESRD (END STAGE RENAL DISEASE) ON DIALYSIS: Primary | ICD-10-CM

## 2019-01-24 DIAGNOSIS — N18.6 ESRD (END STAGE RENAL DISEASE) ON DIALYSIS: Primary | ICD-10-CM

## 2019-01-24 LAB — POCT GLUCOSE: 174 MG/DL (ref 70–110)

## 2019-01-24 PROCEDURE — 36000706: Performed by: OPHTHALMOLOGY

## 2019-01-24 PROCEDURE — 36000707: Performed by: OPHTHALMOLOGY

## 2019-01-24 PROCEDURE — 25000003 PHARM REV CODE 250: Performed by: NURSE ANESTHETIST, CERTIFIED REGISTERED

## 2019-01-24 PROCEDURE — 37000008 HC ANESTHESIA 1ST 15 MINUTES: Performed by: OPHTHALMOLOGY

## 2019-01-24 PROCEDURE — 25000003 PHARM REV CODE 250: Performed by: OPHTHALMOLOGY

## 2019-01-24 PROCEDURE — 63600175 PHARM REV CODE 636 W HCPCS: Performed by: NURSE ANESTHETIST, CERTIFIED REGISTERED

## 2019-01-24 PROCEDURE — 66984 PR REMOVAL, CATARACT, W/INSRT INTRAOC LENS, W/O ENDO CYCLO: ICD-10-PCS | Mod: RT,,, | Performed by: OPHTHALMOLOGY

## 2019-01-24 PROCEDURE — 82962 GLUCOSE BLOOD TEST: CPT | Performed by: OPHTHALMOLOGY

## 2019-01-24 PROCEDURE — 37000009 HC ANESTHESIA EA ADD 15 MINS: Performed by: OPHTHALMOLOGY

## 2019-01-24 PROCEDURE — V2632 POST CHMBR INTRAOCULAR LENS: HCPCS | Performed by: OPHTHALMOLOGY

## 2019-01-24 PROCEDURE — 66984 XCAPSL CTRC RMVL W/O ECP: CPT | Mod: RT,,, | Performed by: OPHTHALMOLOGY

## 2019-01-24 PROCEDURE — A4216 STERILE WATER/SALINE, 10 ML: HCPCS | Performed by: NURSE ANESTHETIST, CERTIFIED REGISTERED

## 2019-01-24 PROCEDURE — 71000015 HC POSTOP RECOV 1ST HR: Performed by: OPHTHALMOLOGY

## 2019-01-24 DEVICE — LENS 24.0: Type: IMPLANTABLE DEVICE | Site: EYE | Status: FUNCTIONAL

## 2019-01-24 RX ORDER — TETRACAINE HYDROCHLORIDE 5 MG/ML
SOLUTION OPHTHALMIC
Status: DISCONTINUED | OUTPATIENT
Start: 2019-01-24 | End: 2019-01-24 | Stop reason: HOSPADM

## 2019-01-24 RX ORDER — MIDAZOLAM HYDROCHLORIDE 1 MG/ML
INJECTION INTRAMUSCULAR; INTRAVENOUS
Status: DISCONTINUED | OUTPATIENT
Start: 2019-01-24 | End: 2019-01-24

## 2019-01-24 RX ORDER — TETRACAINE HYDROCHLORIDE 5 MG/ML
1 SOLUTION OPHTHALMIC
Status: COMPLETED | OUTPATIENT
Start: 2019-01-24 | End: 2019-01-24

## 2019-01-24 RX ORDER — TROPICAMIDE 10 MG/ML
1 SOLUTION/ DROPS OPHTHALMIC
Status: COMPLETED | OUTPATIENT
Start: 2019-01-24 | End: 2019-01-24

## 2019-01-24 RX ORDER — PHENYLEPHRINE HYDROCHLORIDE 25 MG/ML
1 SOLUTION/ DROPS OPHTHALMIC
Status: COMPLETED | OUTPATIENT
Start: 2019-01-24 | End: 2019-01-24

## 2019-01-24 RX ORDER — MOXIFLOXACIN 5 MG/ML
1 SOLUTION/ DROPS OPHTHALMIC
Status: COMPLETED | OUTPATIENT
Start: 2019-01-24 | End: 2019-01-24

## 2019-01-24 RX ORDER — LIDOCAINE HYDROCHLORIDE 40 MG/ML
INJECTION, SOLUTION RETROBULBAR
Status: DISCONTINUED | OUTPATIENT
Start: 2019-01-24 | End: 2019-01-24 | Stop reason: HOSPADM

## 2019-01-24 RX ORDER — MOXIFLOXACIN 5 MG/ML
SOLUTION/ DROPS OPHTHALMIC
Status: DISCONTINUED | OUTPATIENT
Start: 2019-01-24 | End: 2019-01-24 | Stop reason: HOSPADM

## 2019-01-24 RX ORDER — PROPARACAINE HYDROCHLORIDE 5 MG/ML
1 SOLUTION/ DROPS OPHTHALMIC
Status: DISCONTINUED | OUTPATIENT
Start: 2019-01-24 | End: 2019-01-24 | Stop reason: HOSPADM

## 2019-01-24 RX ORDER — SODIUM CHLORIDE 0.9 % (FLUSH) 0.9 %
SYRINGE (ML) INJECTION
Status: DISCONTINUED | OUTPATIENT
Start: 2019-01-24 | End: 2019-01-24

## 2019-01-24 RX ORDER — ACETAMINOPHEN 325 MG/1
650 TABLET ORAL EVERY 4 HOURS PRN
Status: DISCONTINUED | OUTPATIENT
Start: 2019-01-24 | End: 2019-01-24 | Stop reason: HOSPADM

## 2019-01-24 RX ADMIN — TROPICAMIDE 1 DROP: 10 SOLUTION/ DROPS OPHTHALMIC at 08:01

## 2019-01-24 RX ADMIN — MOXIFLOXACIN 1 DROP: 5 SOLUTION/ DROPS OPHTHALMIC at 10:01

## 2019-01-24 RX ADMIN — MIDAZOLAM HYDROCHLORIDE 2 MG: 1 INJECTION, SOLUTION INTRAMUSCULAR; INTRAVENOUS at 09:01

## 2019-01-24 RX ADMIN — MOXIFLOXACIN 1 DROP: 5 SOLUTION/ DROPS OPHTHALMIC at 09:01

## 2019-01-24 RX ADMIN — PHENYLEPHRINE HYDROCHLORIDE 1 DROP: 25 SOLUTION/ DROPS OPHTHALMIC at 09:01

## 2019-01-24 RX ADMIN — MOXIFLOXACIN 1 DROP: 5 SOLUTION/ DROPS OPHTHALMIC at 08:01

## 2019-01-24 RX ADMIN — PHENYLEPHRINE HYDROCHLORIDE 1 DROP: 25 SOLUTION/ DROPS OPHTHALMIC at 08:01

## 2019-01-24 RX ADMIN — TROPICAMIDE 1 DROP: 10 SOLUTION/ DROPS OPHTHALMIC at 09:01

## 2019-01-24 RX ADMIN — TETRACAINE HYDROCHLORIDE 1 DROP: 5 SOLUTION OPHTHALMIC at 08:01

## 2019-01-24 RX ADMIN — SODIUM CHLORIDE, PRESERVATIVE FREE 10 ML: 5 INJECTION INTRAVENOUS at 09:01

## 2019-01-24 RX ADMIN — TETRACAINE HYDROCHLORIDE 1 DROP: 5 SOLUTION OPHTHALMIC at 09:01

## 2019-01-24 NOTE — DISCHARGE SUMMARY
Outcome: Successful outpatient ophthalmic surgical procedure  Preprinted Instructions given to patient.  Regular diet.  Activity: No restrictions  Meds: see Med Rec  Condition: stable  Follow up: 1 day with Dr Moncada  Disposition: Home  Diagnosis: s/p eye surgery

## 2019-01-24 NOTE — ANESTHESIA POSTPROCEDURE EVALUATION
Anesthesia Post Evaluation    Patient: Kylie King    Procedure(s) Performed: Procedure(s) (LRB):  EXTRACTION, CATARACT, WITH IOL INSERTION (Right)    Final Anesthesia Type: MAC  Patient location during evaluation: [facility]  Patient participation: Yes- Able to Participate  Level of consciousness: awake, awake and alert and oriented  Post-procedure vital signs: reviewed and stable  Pain management: adequate  Airway patency: patent  PONV status at discharge: No PONV  Anesthetic complications: no      Cardiovascular status: blood pressure returned to baseline, hemodynamically stable and stable  Respiratory status: unassisted, spontaneous ventilation and room air  Hydration status: euvolemic  Follow-up not needed.        Visit Vitals  BP (!) 134/91 (BP Location: Left leg, Patient Position: Sitting)   Pulse 73   Temp 37.8 °C (100 °F) (Oral)   Resp 16   SpO2 (!) 94%   Breastfeeding? No       Pain/Mario Score: No Data Recorded

## 2019-01-24 NOTE — DISCHARGE INSTRUCTIONS
Immanuel Moncada MD  [facility]  Department of Ophthalmology      AFTER: Cataract Surgery:  Relax at home and DO NOT exert yourself for the rest of the day.  Plan to see Dr. Moncada tomorrow at the eye clinic:   [facility] [facility] [address]  [place] [address]    Refer to attached eye drop instruction sheet     Precautions:  DO NOT rub your eye.  You may resume moderate activity the day after surgery.  Wear protective sunglasses during the day and a shield at night for 1(one) week.  If you have pain, redness and decreased vision, call Dr. Moncada (or the on-call doctor after hours) @[phone].            Home Care Instructions for Eye Surgeries    1. ACTIVITY:  Limit your activity today. Relax at home and DO NOT exert yourself for the rest of the day. Increase activity gradually. You may return to work or school as directed by your physician.    2. DIET:  Drink plenty of fluids. Resume your normal diet unless instructed otherwise.    3. PAIN:  Expect a moderate amount of pain. If a prescription for pain is not sent home with you, you may take your commonly used pain reliever as directed. If this is not sufficient, call your physician. You may resume any other prescription medication unless otherwise directed by your physician.     Discuss any problem with your physician as soon as it arises. Do not Delay.      EMERGENCY- If you are unable to contact your physician, please go to the nearest Emergency Room.       Anesthesia: Monitored Anesthesia Care (MAC)    Anesthesia Safety  · Have an adult family member or friend drive you home after the procedure.  · For the first 24 hours after your surgery:  · Do not drive or use heavy equipment.  · Do not make important decisions or sign documents.  · Avoid alcohol.  · Have someone stay with you, if possible. They can watch for problems and help keep you safe.

## 2019-01-24 NOTE — OP NOTE
SURGEON:  Immanuel Moncada M.D.    PREOPERATIVE DIAGNOSIS:    Nuclear Sclerotic Cataract Right Eye    POSTOPERATIVE DIAGNOSIS:    Nuclear Sclerotic Cataract Right Eye    PROCEDURES:    Phacoemulsification with  intraocular lens, Right eye (41698)    DATE OF SURGERY: 01/24/2019    IMPLANT: WF 24.0    ANESTHESIA:  MAC with topical Lidocaine    COMPLICATIONS:  None    ESTIMATED BLOOD LOSS: None    SPECIMENS: None    INDICATIONS:    The patient has a history of painless progressive visual loss and  difficulty with activities of daily living secondary to cataract formation.  After a thorough discussion of the risks, benefits, and alternatives to cataract surgery, including, but not limited to, the rare risks of infection, retinal detachment, hemorrhage, need for additional surgery, loss of vision, and even loss of the eye, the patient voices understanding and desires to proceed.    DESCRIPTION OF PROCEDURE:    The patients IOL calculations were reviewed, and the lens selection confirmed.   After verification and marking of the proper eye in the preop holding area, the patient was brought to the operating room in supine position where the eye was prepped and draped in standard sterile fashion with 5% Betadine and a lid speculum placed in the eye.   Topical 4% Lidocaine was used in addition to the preoperative anesthesia and the procedure was begun by the creation of a paracentesis incision through which viscoelastic was used to fill the anterior chamber.  Next, a keratome blade was used to create a triplanar temporal clear corneal incision and a cystotome and Utrata forceps used to fashion a continuous curvilinear capsulorrhexis.  Hydrodissection was carried out using the Singleton hydrodissection cannula and the nucleus was found to be mobile.  Phacoemulsification of the nucleus was carried out using a quick chop technique, and all remaining epinuclear and cortical material was removed.  The eye was then reformed with  Viscoelastic and the  intraocular lens was implanted into the capsular bag.  All remaining viscoelastics were removed from the eye and at the end of the case the pupil was round, the lens was well-centered within the capsular bag and all wounds were found to be water tight.  Drops of Vigamox and Pred Forte were instilled and a shield was placed over the eye. The patient will follow up with Dr. Moncada in the morning.

## 2019-01-24 NOTE — ANESTHESIA PREPROCEDURE EVALUATION
01/24/2019  Kylie King is a 63 y.o., female.    Anesthesia Evaluation    I have reviewed the Patient Summary Reports.    I have reviewed the Nursing Notes.   I have reviewed the Medications.     Review of Systems  Anesthesia Hx:  No problems with previous Anesthesia  Denies Family Hx of Anesthesia complications.   Denies Personal Hx of Anesthesia complications.   Social:  Smoker    Cardiovascular:   Hypertension CHF    Pulmonary:   COPD    Renal/:   Chronic Renal Disease    Hepatic/GI:   GERD    Musculoskeletal:   Arthritis     Neurological:   CVA    Endocrine:   Diabetes           Anesthesia Plan  Type of Anesthesia, risks & benefits discussed:  Anesthesia Type:  MAC  Patient's Preference:   Intra-op Monitoring Plan: standard ASA monitors  Intra-op Monitoring Plan Comments:   Post Op Pain Control Plan: multimodal analgesia  Post Op Pain Control Plan Comments:   Induction:   IV  Beta Blocker:         Informed Consent: Patient understands risks and agrees with Anesthesia plan.  Questions answered. Anesthesia consent signed with patient.  ASA Score: 3     Day of Surgery Review of History & Physical:    H&P update referred to the surgeon.         Ready For Surgery From Anesthesia Perspective.

## 2019-01-24 NOTE — PLAN OF CARE
Kylie REGINA King has met all discharge criteria from Phase II. Vital Signs are stable, ambulating  without difficulty. Discharge instructions given, patient verbalized understanding. Discharged from facility via wheelchair in stable condition.

## 2019-01-25 ENCOUNTER — OFFICE VISIT (OUTPATIENT)
Dept: OPHTHALMOLOGY | Facility: CLINIC | Age: 64
End: 2019-01-25
Payer: COMMERCIAL

## 2019-01-25 DIAGNOSIS — H25.11 NUCLEAR SCLEROTIC CATARACT OF RIGHT EYE: ICD-10-CM

## 2019-01-25 DIAGNOSIS — Z98.890 POSTOPERATIVE EYE STATE: ICD-10-CM

## 2019-01-25 DIAGNOSIS — Z98.890 POST-OPERATIVE STATE: Primary | ICD-10-CM

## 2019-01-25 PROCEDURE — 99024 POSTOP FOLLOW-UP VISIT: CPT | Mod: S$GLB,,, | Performed by: OPHTHALMOLOGY

## 2019-01-25 PROCEDURE — 99999 PR PBB SHADOW E&M-EST. PATIENT-LVL III: ICD-10-PCS | Mod: PBBFAC,,, | Performed by: OPHTHALMOLOGY

## 2019-01-25 PROCEDURE — 99024 PR POST-OP FOLLOW-UP VISIT: ICD-10-PCS | Mod: S$GLB,,, | Performed by: OPHTHALMOLOGY

## 2019-01-25 PROCEDURE — 99999 PR PBB SHADOW E&M-EST. PATIENT-LVL III: CPT | Mod: PBBFAC,,, | Performed by: OPHTHALMOLOGY

## 2019-01-25 NOTE — PROGRESS NOTES
HPI     POD 1 Phaco w/IOL OD January 24, 2019    MEDS:    Prednisolone/Gatiflox/Bromfenac TID OD    Patient states she is doing well with no complaints.    Last edited by Siri Almanza on 1/25/2019 12:30 PM. (History)            Assessment /Plan     For exam results, see Encounter Report.    Post-operative state    Nuclear sclerotic cataract of right eye    Postoperative eye state      1. POD1 s/p CEIOL OD  - Patient states she had no issues overnight  - Today, IOP good at 13 wound batsheva negative  - +3 cell/flare  - Plan to start combination prednisolone-gatifloxacin-bromfenac drops tid OS  - Sunglasses when outside  - No bending, stooping, or straining for 2-3 days  - Endophthalmitis precautions reviewed  - Patient told to call with any problems.  - RTC 1 week with Dr. Moncada

## 2019-01-28 NOTE — PROGRESS NOTES
Subjective:      Patient ID: Kylie King is a 63 y.o. female.    Chief Complaint: Diabetes Mellitus (dr foote 10/05/2018) and Diabetic Foot Exam    Kylie is a 63 y.o. female who presents to the clinic for evaluation and treatment of high risk feet. Kylie has a past medical history of Anxiety, Arthritis, Breast cancer (1/2013), Carotid artery stenosis (8/13/2018), Choledocholithiasis, Chronic obstructive pulmonary disease (6/8/2018), Chronic venous insufficiency, Colon cancer (2001), CRI (chronic renal insufficiency), Dialysis AV fistula malfunction, ESRD (end stage renal disease) on dialysis, Former smoker (6/8/2018), GERD (gastroesophageal reflux disease), History of acute pancreatitis (2009), History of cerebrovascular accident (6/8/2018), History of colon cancer, HTN (hypertension), benign, Hypercholesterolemia, Hyperlipidemia, Hypoxemia (6/8/2018), Intracerebral hemorrhage, Kidney stone, Lymphedema of both lower extremities, Obesity, Pancreatitis, Pancreatitis (2008), Physical deconditioning, Pulmonary edema, Sacroiliac joint pain, Spinal stenosis, lumbar, Stroke (12/2012), Tobacco abuse, and Type 2 diabetes mellitus with neurological manifestations, controlled. The patient's chief complaint is  elongated toenails       PCP: Virginia Foote MD    Date Last Seen by PCP: dr foote10/05/2018  Current shoe gear:  Affected Foot: Tennis shoes     Unaffected Foot: Tennis shoes    History of Trauma: negative  Sign of Infection: none    Hemoglobin A1C   Date Value Ref Range Status   10/09/2018 7.8 % Final   04/19/2018 6.8 (H) 4.0 - 5.6 % Final     Comment:     According to ADA guidelines, hemoglobin A1c <7.0% represents  optimal control in non-pregnant diabetic patients. Different  metrics may apply to specific patient populations.   Standards of Medical Care in Diabetes-2016.  For the purpose of screening for the presence of diabetes:  <5.7%     Consistent with the absence of diabetes  5.7-6.4%  Consistent  with increasing risk for diabetes   (prediabetes)  >or=6.5%  Consistent with diabetes  Currently, no consensus exists for use of hemoglobin A1c  for diagnosis of diabetes for children.  This Hemoglobin A1c assay has significant interference with fetal   hemoglobin   (HbF). The results are invalid for patients with abnormal amounts of   HbF,   including those with known Hereditary Persistence   of Fetal Hemoglobin. Heterozygous hemoglobin variants (HbAS, HbAC,   HbAD, HbAE, HbA2) do not significantly interfere with this assay;   however, presence of multiple variants in a sample may impact the %   interference.     01/29/2018 8.1 (H) 4.0 - 5.6 % Final     Comment:     According to ADA guidelines, hemoglobin A1c <7.0% represents  optimal control in non-pregnant diabetic patients. Different  metrics may apply to specific patient populations.   Standards of Medical Care in Diabetes-2016.  For the purpose of screening for the presence of diabetes:  <5.7%     Consistent with the absence of diabetes  5.7-6.4%  Consistent with increasing risk for diabetes   (prediabetes)  >or=6.5%  Consistent with diabetes  Currently, no consensus exists for use of hemoglobin A1c  for diagnosis of diabetes for children.  This Hemoglobin A1c assay has significant interference with fetal   hemoglobin   (HbF). The results are invalid for patients with abnormal amounts of   HbF,   including those with known Hereditary Persistence   of Fetal Hemoglobin. Heterozygous hemoglobin variants (HbAS, HbAC,   HbAD, HbAE, HbA2) do not significantly interfere with this assay;   however, presence of multiple variants in a sample may impact the %   interference.     03/17/2017 10.4 (H) 4.5 - 6.2 % Final     Comment:     According to ADA guidelines, hemoglobin A1C <7.0% represents  optimal control in non-pregnant diabetic patients.  Different  metrics may apply to specific populations.   Standards of Medical Care in Diabetes - 2016.  For the purpose of  screening for the presence of diabetes:  <5.7%     Consistent with the absence of diabetes  5.7-6.4%  Consistent with increasing risk for diabetes   (prediabetes)  >or=6.5%  Consistent with diabetes  Currently no consensus exists for use of hemoglobin A1C  for diagnosis of diabetes for children.         Review of Systems   Constitution: Negative for chills, fever and malaise/fatigue.   HENT: Negative for hearing loss.    Cardiovascular: Positive for leg swelling. Negative for claudication.   Respiratory: Negative for shortness of breath.    Skin: Positive for color change, dry skin, nail changes and unusual hair distribution. Negative for flushing and rash.   Musculoskeletal: Positive for joint pain. Negative for myalgias.   Neurological: Positive for numbness, paresthesias and sensory change.   Psychiatric/Behavioral: Negative for altered mental status.           Objective:      Physical Exam   Constitutional: She is oriented to person, place, and time. She appears well-developed and well-nourished. She is cooperative.   Cardiovascular:   Pulses:       Dorsalis pedis pulses are 0 on the right side, and 0 on the left side.        Posterior tibial pulses are 1+ on the right side, and 1+ on the left side.   Non-pitting edema noted to b/L LEs   Musculoskeletal: She exhibits edema and tenderness.        Right knee: She exhibits no swelling and no ecchymosis.        Left knee: She exhibits no swelling and no ecchymosis.        Right ankle: She exhibits normal range of motion, no swelling, no ecchymosis and normal pulse. No lateral malleolus, no medial malleolus and no head of 5th metatarsal tenderness found. Achilles tendon exhibits no pain, no defect and normal Ortiz's test results.        Left ankle: She exhibits normal range of motion, no swelling, no ecchymosis and normal pulse. No lateral malleolus, no medial malleolus and no head of 5th metatarsal tenderness found. Achilles tendon exhibits no pain and normal  Ortiz's test results.        Right lower leg: She exhibits no tenderness, no bony tenderness, no swelling, no edema and no deformity.        Left lower leg: She exhibits no tenderness, no swelling and no edema.        Right foot: There is normal range of motion and no deformity.        Left foot: There is normal range of motion and no deformity.   Decreased ROM with out joint pain nor crepitation to bilateral feet and ankle joints.  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 0 sites sensed.   Skin Integrity: Positive for ulcer.   Left Foot:   Protective Sensation: 5 sites tested. 0 sites sensed.   Skin Integrity: Positive for dry skin.   Neurological: She is alert and oriented to person, place, and time. A sensory deficit is present.   Absent protective sensation b/L   Skin: Skin is warm and dry. Capillary refill takes more than 3 seconds. No abrasion, no bruising, no burn and no ecchymosis noted.     Nails x9 are elongated by  4-6mm's, thickened by 2-3 mm's, dystrophic, and are yellowish in  coloration . Xerosis Bilaterally.     No open lesions b/L  HKL noted to left 4th digit   Psychiatric: She has a normal mood and affect. Her speech is normal. She is slowed. She is attentive.   Vitals reviewed.            Assessment:       Encounter Diagnoses   Name Primary?    Diabetic polyneuropathy associated with type 2 diabetes mellitus Yes    Onychomycosis due to dermatophyte     Corn or callus          Plan:       Kylie was seen today for diabetes mellitus and diabetic foot exam.    Diagnoses and all orders for this visit:    Diabetic polyneuropathy associated with type 2 diabetes mellitus    Onychomycosis due to dermatophyte    Corn or callus      I counseled the patient on her conditions, their implications and medical management.    With the patient's permission, nails were aggressively reduced and debrided X 9  to their soft tissue attachment mechanically and with an electric  removing all offending nail and debris. Pt relates relief following the procedure. She will continue to monitor the areas daily, inspect her feet, wear protective shoe gear when ambulating, moisturized daily to maintain skin integrity and follow up in the office in 3 months.     Diabetic foot exam performed on pt. Pt advised on daily monitoring and moisturizing of the feet and keeping the webspaces clean and dry Patient advised to contact the clinic if any new pedal problems should arise.   After cleansing with an alcohol prep pad, the about mentioned hyperkeratotic lesions were sharply debrided X 1 utilizing a #15 blade to a smooth base without incident. Pt tolerated the procedure well and reported comfort to the debarment sites. Pt will continue to use padding and moisture the callused areas.       .

## 2019-01-30 ENCOUNTER — OFFICE VISIT (OUTPATIENT)
Dept: OPHTHALMOLOGY | Facility: CLINIC | Age: 64
End: 2019-01-30
Payer: COMMERCIAL

## 2019-01-30 DIAGNOSIS — H25.13 NUCLEAR SCLEROSIS, BILATERAL: Primary | ICD-10-CM

## 2019-01-30 PROCEDURE — 99024 PR POST-OP FOLLOW-UP VISIT: ICD-10-PCS | Mod: POP,,, | Performed by: OPHTHALMOLOGY

## 2019-01-30 PROCEDURE — 99212 OFFICE O/P EST SF 10 MIN: CPT | Mod: PBBFAC | Performed by: OPHTHALMOLOGY

## 2019-01-30 PROCEDURE — 99999 PR PBB SHADOW E&M-EST. PATIENT-LVL II: CPT | Mod: PBBFAC,,, | Performed by: OPHTHALMOLOGY

## 2019-01-30 PROCEDURE — 99024 POSTOP FOLLOW-UP VISIT: CPT | Mod: POP,,, | Performed by: OPHTHALMOLOGY

## 2019-01-30 PROCEDURE — 99999 PR PBB SHADOW E&M-EST. PATIENT-LVL II: ICD-10-PCS | Mod: PBBFAC,,, | Performed by: OPHTHALMOLOGY

## 2019-01-30 RX ORDER — PHENYLEPHRINE HYDROCHLORIDE 25 MG/ML
1 SOLUTION/ DROPS OPHTHALMIC
Status: CANCELLED | OUTPATIENT
Start: 2019-01-30

## 2019-01-30 RX ORDER — TETRACAINE HYDROCHLORIDE 5 MG/ML
1 SOLUTION OPHTHALMIC
Status: CANCELLED | OUTPATIENT
Start: 2019-01-30

## 2019-01-30 RX ORDER — MOXIFLOXACIN 5 MG/ML
1 SOLUTION/ DROPS OPHTHALMIC
Status: CANCELLED | OUTPATIENT
Start: 2019-01-30

## 2019-01-30 RX ORDER — TROPICAMIDE 10 MG/ML
1 SOLUTION/ DROPS OPHTHALMIC
Status: CANCELLED | OUTPATIENT
Start: 2019-01-30

## 2019-01-30 NOTE — PROGRESS NOTES
HPI     POD 1 Phaco w/IOL OD January 24, 2019    MEDS:    Prednisolone/Gatiflox/Bromfenac TID OD    Patient states she is doing well with no complaints.    Last edited by Siri Almanza on 1/25/2019 12:30 PM. (History)            Assessment /Plan     For exam results, see Encounter Report.    Post-operative state    Nuclear sclerotic cataract of right eye    Postoperative eye state

## 2019-01-30 NOTE — PROGRESS NOTES
HPI     Post-op Evaluation      Additional comments: Pt states OD is doing well and has no complaints   today              Comments     S/p Phaco IOL OD 1/24/19    MEDS:  PBG TID OD          Last edited by Lara Moreira MA on 1/30/2019  1:31 PM. (History)            Assessment /Plan     For exam results, see Encounter Report.    Nuclear sclerosis, bilateral      Slit lamp exam:  L/L: nl  K: clear, wound sealed  AC: trace cell  Iris/Lens: IOL centered and stable    POW1 s/p phaco: Surgery healing well with no signs of infection or abnormal inflammation.    Patient wishes to proceed with surgery in the second eye. Risks, benefits, alternatives reviewed. IOL selection reviewed.     Left eye  IOL: WF 24.5      Declines flacs

## 2019-01-31 ENCOUNTER — ANESTHESIA (OUTPATIENT)
Dept: SURGERY | Facility: OTHER | Age: 64
End: 2019-01-31
Payer: COMMERCIAL

## 2019-01-31 ENCOUNTER — HOSPITAL ENCOUNTER (OUTPATIENT)
Facility: OTHER | Age: 64
Discharge: HOME | End: 2019-01-31
Attending: OPHTHALMOLOGY | Admitting: OPHTHALMOLOGY
Payer: COMMERCIAL

## 2019-01-31 ENCOUNTER — ANESTHESIA EVENT (OUTPATIENT)
Dept: SURGERY | Facility: OTHER | Age: 64
End: 2019-01-31
Payer: COMMERCIAL

## 2019-01-31 VITALS
BODY MASS INDEX: 44.72 KG/M2 | DIASTOLIC BLOOD PRESSURE: 68 MMHG | SYSTOLIC BLOOD PRESSURE: 138 MMHG | WEIGHT: 261.94 LBS | HEART RATE: 68 BPM | RESPIRATION RATE: 16 BRPM | TEMPERATURE: 98 F | OXYGEN SATURATION: 95 % | HEIGHT: 64 IN

## 2019-01-31 DIAGNOSIS — H25.13 NUCLEAR SCLEROSIS, BILATERAL: ICD-10-CM

## 2019-01-31 LAB — POCT GLUCOSE: 177 MG/DL (ref 70–110)

## 2019-01-31 PROCEDURE — 37000009 HC ANESTHESIA EA ADD 15 MINS: Performed by: OPHTHALMOLOGY

## 2019-01-31 PROCEDURE — 36000706: Performed by: OPHTHALMOLOGY

## 2019-01-31 PROCEDURE — 36000707: Performed by: OPHTHALMOLOGY

## 2019-01-31 PROCEDURE — 66984 XCAPSL CTRC RMVL W/O ECP: CPT | Mod: 79,LT,, | Performed by: OPHTHALMOLOGY

## 2019-01-31 PROCEDURE — 66984 PR REMOVAL, CATARACT, W/INSRT INTRAOC LENS, W/O ENDO CYCLO: ICD-10-PCS | Mod: 79,LT,, | Performed by: OPHTHALMOLOGY

## 2019-01-31 PROCEDURE — 25000003 PHARM REV CODE 250: Performed by: OPHTHALMOLOGY

## 2019-01-31 PROCEDURE — 37000008 HC ANESTHESIA 1ST 15 MINUTES: Performed by: OPHTHALMOLOGY

## 2019-01-31 PROCEDURE — A4216 STERILE WATER/SALINE, 10 ML: HCPCS | Performed by: NURSE ANESTHETIST, CERTIFIED REGISTERED

## 2019-01-31 PROCEDURE — 25000003 PHARM REV CODE 250: Performed by: NURSE ANESTHETIST, CERTIFIED REGISTERED

## 2019-01-31 PROCEDURE — 71000016 HC POSTOP RECOV ADDL HR: Performed by: OPHTHALMOLOGY

## 2019-01-31 PROCEDURE — 63600175 PHARM REV CODE 636 W HCPCS: Performed by: NURSE ANESTHETIST, CERTIFIED REGISTERED

## 2019-01-31 PROCEDURE — 71000015 HC POSTOP RECOV 1ST HR: Performed by: OPHTHALMOLOGY

## 2019-01-31 PROCEDURE — V2632 POST CHMBR INTRAOCULAR LENS: HCPCS | Performed by: OPHTHALMOLOGY

## 2019-01-31 DEVICE — LENS 24.5: Type: IMPLANTABLE DEVICE | Site: EYE | Status: FUNCTIONAL

## 2019-01-31 RX ORDER — TETRACAINE HYDROCHLORIDE 5 MG/ML
1 SOLUTION OPHTHALMIC
Status: COMPLETED | OUTPATIENT
Start: 2019-01-31 | End: 2019-01-31

## 2019-01-31 RX ORDER — MOXIFLOXACIN 5 MG/ML
1 SOLUTION/ DROPS OPHTHALMIC
Status: COMPLETED | OUTPATIENT
Start: 2019-01-31 | End: 2019-01-31

## 2019-01-31 RX ORDER — TETRACAINE HYDROCHLORIDE 5 MG/ML
SOLUTION OPHTHALMIC
Status: DISCONTINUED | OUTPATIENT
Start: 2019-01-31 | End: 2019-01-31 | Stop reason: HOSPADM

## 2019-01-31 RX ORDER — ACETAMINOPHEN 325 MG/1
650 TABLET ORAL EVERY 4 HOURS PRN
Status: DISCONTINUED | OUTPATIENT
Start: 2019-01-31 | End: 2019-01-31 | Stop reason: HOSPADM

## 2019-01-31 RX ORDER — MIDAZOLAM HYDROCHLORIDE 1 MG/ML
INJECTION, SOLUTION INTRAMUSCULAR; INTRAVENOUS
Status: DISCONTINUED | OUTPATIENT
Start: 2019-01-31 | End: 2019-01-31

## 2019-01-31 RX ORDER — LIDOCAINE HYDROCHLORIDE 40 MG/ML
INJECTION, SOLUTION RETROBULBAR
Status: DISCONTINUED | OUTPATIENT
Start: 2019-01-31 | End: 2019-01-31 | Stop reason: HOSPADM

## 2019-01-31 RX ORDER — SODIUM CHLORIDE 0.9 % (FLUSH) 0.9 %
SYRINGE (ML) INJECTION
Status: DISCONTINUED | OUTPATIENT
Start: 2019-01-31 | End: 2019-01-31

## 2019-01-31 RX ORDER — PHENYLEPHRINE HYDROCHLORIDE 25 MG/ML
1 SOLUTION/ DROPS OPHTHALMIC
Status: COMPLETED | OUTPATIENT
Start: 2019-01-31 | End: 2019-01-31

## 2019-01-31 RX ORDER — MOXIFLOXACIN 5 MG/ML
SOLUTION/ DROPS OPHTHALMIC
Status: DISCONTINUED | OUTPATIENT
Start: 2019-01-31 | End: 2019-01-31 | Stop reason: HOSPADM

## 2019-01-31 RX ORDER — TROPICAMIDE 10 MG/ML
1 SOLUTION/ DROPS OPHTHALMIC
Status: COMPLETED | OUTPATIENT
Start: 2019-01-31 | End: 2019-01-31

## 2019-01-31 RX ORDER — PROPARACAINE HYDROCHLORIDE 5 MG/ML
1 SOLUTION/ DROPS OPHTHALMIC
Status: DISCONTINUED | OUTPATIENT
Start: 2019-01-31 | End: 2019-01-31 | Stop reason: HOSPADM

## 2019-01-31 RX ADMIN — SODIUM CHLORIDE, PRESERVATIVE FREE 10 ML: 5 INJECTION INTRAVENOUS at 09:01

## 2019-01-31 RX ADMIN — MOXIFLOXACIN 1 DROP: 5 SOLUTION/ DROPS OPHTHALMIC at 09:01

## 2019-01-31 RX ADMIN — PHENYLEPHRINE HYDROCHLORIDE 1 DROP: 25 SOLUTION/ DROPS OPHTHALMIC at 08:01

## 2019-01-31 RX ADMIN — MOXIFLOXACIN 1 DROP: 5 SOLUTION/ DROPS OPHTHALMIC at 10:01

## 2019-01-31 RX ADMIN — TETRACAINE HYDROCHLORIDE 1 DROP: 5 SOLUTION OPHTHALMIC at 08:01

## 2019-01-31 RX ADMIN — MOXIFLOXACIN 1 DROP: 5 SOLUTION/ DROPS OPHTHALMIC at 08:01

## 2019-01-31 RX ADMIN — MIDAZOLAM 2 MG: 1 INJECTION INTRAMUSCULAR; INTRAVENOUS at 09:01

## 2019-01-31 RX ADMIN — TROPICAMIDE 1 DROP: 10 SOLUTION/ DROPS OPHTHALMIC at 08:01

## 2019-01-31 NOTE — ANESTHESIA PREPROCEDURE EVALUATION
01/31/2019  Kylie King is a 63 y.o., female.    Pre-op Assessment    I have reviewed the Patient Summary Reports.     I have reviewed the Nursing Notes.   I have reviewed the Medications.     Review of Systems  Anesthesia Hx:  No problems with previous Anesthesia  Denies Family Hx of Anesthesia complications.   Denies Personal Hx of Anesthesia complications.   Social:  Smoker    Cardiovascular:   Hypertension CHF    Pulmonary:   COPD    Renal/:   Chronic Renal Disease    Hepatic/GI:   GERD    Musculoskeletal:   Arthritis     Neurological:   CVA    Endocrine:   Diabetes             Anesthesia Plan  Type of Anesthesia, risks & benefits discussed:  Anesthesia Type:  MAC  Patient's Preference:   Intra-op Monitoring Plan: standard ASA monitors  Intra-op Monitoring Plan Comments:   Post Op Pain Control Plan: multimodal analgesia  Post Op Pain Control Plan Comments:   Induction:   IV  Beta Blocker:         Informed Consent: Patient understands risks and agrees with Anesthesia plan.  Questions answered. Anesthesia consent signed with patient.  ASA Score: 3     Day of Surgery Review of History & Physical:    H&P update referred to the surgeon.         Ready For Surgery From Anesthesia Perspective.

## 2019-01-31 NOTE — OP NOTE
SURGEON:  Immanuel Moncada M.D.    PREOPERATIVE DIAGNOSIS:    Nuclear Sclerotic Cataract Left Eye    POSTOPERATIVE DIAGNOSIS:    Nuclear Sclerotic Cataract Left Eye    PROCEDURES:    Phacoemulsification with  intraocular lens, Left eye (07565)    DATE OF SURGERY: 01/31/2019    IMPLANT: WF 24.5    ANESTHESIA:  MAC with topical Lidocaine    COMPLICATIONS:  None    ESTIMATED BLOOD LOSS: None    SPECIMENS: None    INDICATIONS:    The patient has a history of painless progressive visual loss and  difficulty with activities of daily living secondary to cataract formation.  After a thorough discussion of the risks, benefits, and alternatives to cataract surgery, including, but not limited to, the rare risks of infection, retinal detachment, hemorrhage, need for additional surgery, loss of vision, and even loss of the eye, the patient voices understanding and desires to proceed.    DESCRIPTION OF PROCEDURE:    The patients IOL calculations were reviewed, and the lens selection confirmed.   After verification and marking of the proper eye in the preop holding area, the patient was brought to the operating room in supine position where the eye was prepped and draped in standard sterile fashion with 5% Betadine and a lid speculum placed in the eye.   Topical 4% Lidocaine was used in addition to the preoperative anesthesia and the procedure was begun by the creation of a paracentesis incision through which viscoelastic was used to fill the anterior chamber.  Next, a keratome blade was used to create a triplanar temporal clear corneal incision and a cystotome and Utrata forceps used to fashion a continuous curvilinear capsulorrhexis.  Hydrodissection was carried out using the Singleton hydrodissection cannula and the nucleus was found to be mobile.  Phacoemulsification of the nucleus was carried out using a quick chop technique, and all remaining epinuclear and cortical material was removed.  The eye was then reformed with Viscoelastic  and the  intraocular lens was implanted into the capsular bag.  All remaining viscoelastics were removed from the eye and at the end of the case the pupil was round, the lens was well-centered within the capsular bag and all wounds were found to be water tight.  Drops of Vigamox and Pred Forte were instilled and a shield was placed over the eye. The patient will follow up with Dr. Moncada in the morning.

## 2019-01-31 NOTE — ANESTHESIA POSTPROCEDURE EVALUATION
"Anesthesia Post Evaluation    Patient: Kylie King    Procedure(s) Performed: Procedure(s) (LRB):  EXTRACTION, CATARACT, WITH IOL INSERTION (Left)    Final Anesthesia Type: MAC  Patient location during evaluation: [facility]  Patient participation: Yes- Able to Participate  Level of consciousness: awake and alert  Post-procedure vital signs: reviewed and stable (B/P-117/58, HR-72, RR-15, O2%-99)  Pain management: adequate  Airway patency: patent  PONV status at discharge: No PONV  Anesthetic complications: no      Cardiovascular status: hemodynamically stable  Respiratory status: unassisted, spontaneous ventilation and room air  Hydration status: euvolemic  Follow-up not needed.        Visit Vitals  /80 (BP Location: Left leg, Patient Position: Lying)   Pulse 95   Temp 36.4 °C (97.6 °F) (Oral)   Resp 18   Ht 5' 4" (1.626 m)   Wt 118.8 kg (261 lb 14.5 oz)   SpO2 97%   Breastfeeding? Unknown   BMI 44.96 kg/m²       Pain/Mario Score: No Data Recorded      "

## 2019-01-31 NOTE — DISCHARGE INSTRUCTIONS
Immanuel Moncada MD  [facility]  Department of Ophthalmology      AFTER: Cataract Surgery:   Relax at home and DO NOT exert yourself for the rest of the day.  Plan to see Dr. Moncada tomorrow at the eye clinic:   [address]     Refer to the attached eye drop instruction sheet    Precautions:  DO NOT rub your eye.  You may resume moderate activity the day after surgery.  Wear protective sunglasses during the day and a shield at night for 1(one) week.  If you have pain, redness and decreased vision, call Dr. Moncada (or the on-call doctor after hours) @[phone].                    Home Care Instructions for Eye Surgeries    1. ACTIVITY:  Limit your activity today. Relax at home and DO NOT exert yourself for the rest of the day. No bending forward at the waist for one week. Increase activity gradually. You may return to work or school as directed by your physician.    2. DIET:  Drink plenty of fluids. Resume your normal diet unless instructed otherwise.    3. PAIN:  Expect a moderate amount of pain. If a prescription for pain is not sent home with you, you may take your commonly used pain reliever as directed. If this is not sufficient, call your physician. You may resume any other prescription medication unless otherwise directed by your physician.     Discuss any problem with your physician as soon as it arises. Do not Delay.    EMERGENCY- If you are unable to contact your physician, please go to the nearest Emergency Room.       Anesthesia: Monitored Anesthesia Care (MAC)    Anesthesia Safety  · Have an adult family member or friend drive you home after the procedure.  · For the first 24 hours after your surgery:  · Do not drive or use heavy equipment.  · Do not make important decisions or sign documents.  · Avoid alcohol.  Have someone stay with you, if possible. They can watch for problems and help keep you safe.

## 2019-02-01 ENCOUNTER — OFFICE VISIT (OUTPATIENT)
Dept: INTERNAL MEDICINE | Facility: CLINIC | Age: 64
End: 2019-02-01
Payer: COMMERCIAL

## 2019-02-01 ENCOUNTER — OFFICE VISIT (OUTPATIENT)
Dept: OPHTHALMOLOGY | Facility: CLINIC | Age: 64
End: 2019-02-01
Payer: MEDICARE

## 2019-02-01 VITALS — DIASTOLIC BLOOD PRESSURE: 80 MMHG | SYSTOLIC BLOOD PRESSURE: 140 MMHG | TEMPERATURE: 99 F | RESPIRATION RATE: 20 BRPM

## 2019-02-01 DIAGNOSIS — I10 ESSENTIAL HYPERTENSION: ICD-10-CM

## 2019-02-01 DIAGNOSIS — M48.062 SPINAL STENOSIS OF LUMBAR REGION WITH NEUROGENIC CLAUDICATION: Primary | ICD-10-CM

## 2019-02-01 DIAGNOSIS — Z98.890 POST-OPERATIVE STATE: Primary | ICD-10-CM

## 2019-02-01 PROCEDURE — 99213 OFFICE O/P EST LOW 20 MIN: CPT | Mod: PBBFAC,27,PO | Performed by: INTERNAL MEDICINE

## 2019-02-01 PROCEDURE — 99024 POSTOP FOLLOW-UP VISIT: CPT | Mod: POP,,, | Performed by: OPHTHALMOLOGY

## 2019-02-01 PROCEDURE — 99999 PR PBB SHADOW E&M-EST. PATIENT-LVL III: CPT | Mod: PBBFAC,,, | Performed by: INTERNAL MEDICINE

## 2019-02-01 PROCEDURE — 99024 PR POST-OP FOLLOW-UP VISIT: ICD-10-PCS | Mod: POP,,, | Performed by: OPHTHALMOLOGY

## 2019-02-01 PROCEDURE — 99213 OFFICE O/P EST LOW 20 MIN: CPT | Mod: S$GLB,,, | Performed by: INTERNAL MEDICINE

## 2019-02-01 PROCEDURE — 99212 OFFICE O/P EST SF 10 MIN: CPT | Mod: PBBFAC | Performed by: OPHTHALMOLOGY

## 2019-02-01 PROCEDURE — 99999 PR PBB SHADOW E&M-EST. PATIENT-LVL III: ICD-10-PCS | Mod: PBBFAC,,, | Performed by: INTERNAL MEDICINE

## 2019-02-01 PROCEDURE — 99213 PR OFFICE/OUTPT VISIT, EST, LEVL III, 20-29 MIN: ICD-10-PCS | Mod: S$GLB,,, | Performed by: INTERNAL MEDICINE

## 2019-02-01 PROCEDURE — 99999 PR PBB SHADOW E&M-EST. PATIENT-LVL II: ICD-10-PCS | Mod: PBBFAC,,, | Performed by: OPHTHALMOLOGY

## 2019-02-01 PROCEDURE — 99999 PR PBB SHADOW E&M-EST. PATIENT-LVL II: CPT | Mod: PBBFAC,,, | Performed by: OPHTHALMOLOGY

## 2019-02-01 RX ORDER — ORPHENADRINE CITRATE 100 MG/1
100 TABLET, EXTENDED RELEASE ORAL 2 TIMES DAILY PRN
Qty: 30 TABLET | Refills: 1 | Status: SHIPPED | OUTPATIENT
Start: 2019-02-01 | End: 2019-03-19

## 2019-02-01 NOTE — PROGRESS NOTES
HPI     1 day post op phaco with IOL OS (1/31/2019).  Pt states that vision is doing well OU. Denies flashes or floaters OU. No   pain or discomfort OU.    Pt confirms using  PGB TID OU    Last edited by Ambika Sharma on 2/1/2019  1:14 PM. (History)            Assessment /Plan     For exam results, see Encounter Report.    Post-operative state      Slit lamp exam:  L/L: nl  K: clear, wound sealed  AC: 1+ cell  Lens: IOL centered and stable    POD1 s/p Phaco/IOL  Appropriate precautions and post op medications reviewed.  Patient instructed to call or come in if symptoms of redness, decreased vision, or pain are experienced.

## 2019-02-01 NOTE — PROGRESS NOTES
CC: followup of hypertension and diabetes  HPI:  The patient is a 63 y.o. year old female who presents to the office for followup of hypertension and diabetes.  The patient denies any chest pain, shortness of breath, excessive fatigue, nausea or vomiting, but complains of headache due to neck pain.  She does report blurred vision due to cataracts.  She reports awakening one morning about 2 weeks ago with constant throbbing, aching stabbing sensation that varies in intensity.  Pain varies in intensity, but is exacerbated when recumbent.  Pain is 10/10.  Pain radiates down right leg, and has numbness and tingling.  Blood sugars have been elevated recently since patient has been in pain.    PAST MEDICAL HISTORY:  Past Medical History:   Diagnosis Date    Anxiety     Arthritis     DDD, Lumbar    Breast cancer 1/2013    left breast- invasive mammary carcinoma, ER/LA positive, Her2 negative    Carotid artery stenosis 8/13/2018 6/22/2018: Carotid Duplex: SHANELL: Moderate plaquing - 2.0 m/s - <50%. LICA: Moderate plaquing - 1.6 m/s - <50%.    Cataract     Choledocholithiasis     s/p ERCP and stent placement    Chronic obstructive pulmonary disease 6/8/2018    Chronic venous insufficiency     Colon cancer 2001    CRI (chronic renal insufficiency)     one kidney    Dialysis AV fistula malfunction     ESRD (end stage renal disease) on dialysis     Former smoker 6/8/2018    GERD (gastroesophageal reflux disease)     History of acute pancreatitis 2009 2/09 s/p sphincterotomy    History of cerebrovascular accident 6/8/2018    History of colon cancer     s/p sigmoid colectomy    HTN (hypertension), benign     Hypercholesterolemia     Hyperlipidemia     Hypoxemia 6/8/2018    Intracerebral hemorrhage     Kidney stone     left    Lymphedema of both lower extremities     Obesity     Pancreatitis     Pancreatitis 2008    Physical deconditioning     Pulmonary edema     Sacroiliac joint pain     Spinal  stenosis, lumbar     Stroke 12/2012    Tobacco abuse     Type 2 diabetes mellitus with neurological manifestations, controlled     Neuropathy       SURGICAL HISTORY:  Past Surgical History:   Procedure Laterality Date    AMPUTATION-TOE, partial Left 11/25/2016    Performed by Jose Delacruz Jr., DPM at [facility] OR 2ND FLR    BIOPSY, LYMPH NODE, SENTINEL Left 2/25/2013    Performed by Dirk Oneil MD at [facility] OR 2ND FLR    BLOCK-NERVE-MEDIAL BRANCH-LUMBAR Bilateral 3/1/2016    Performed by Stacie Negrete MD at [facility] [place] PAIN MGT    BLOCK-NERVE-MEDIAL BRANCH-LUMBAR Bilateral 2/23/2016    Performed by Stacie Negrete MD at [facility] MGT    BREAST BIOPSY  1/2012    left breast invasive mammary carcinoma (lateral bx) and intraductal papilloma (medial bx)    BREAST BIOPSY  1999    rt breast FA    CHOLECYSTECTOMY  2001    COLON SURGERY      CREATION -FISTULA-AV Right 5/28/2018    Performed by RICK Pollard III, MD at [facility] OR 2ND FLR    EXTRACTION, CATARACT, WITH IOL INSERTION Left 1/31/2019    Performed by Immanuel Moncada MD at [facility] [place]    EXTRACTION, CATARACT, WITH IOL INSERTION Right 1/24/2019    Performed by Immanuel Moncada MD at [facility] [place]    HERNIA REPAIR      INJECTION, FOR SENTINEL NODE IDENTIFICATION Left 2/25/2013    Performed by Dirk Oneil MD at [facility] OR 2ND FLR    INJECTION-JOINT Bilateral 1/20/2016    Performed by Stacie Negrete MD at [facility] MGT    INSERTION, TISSUE EXPANDER, BREAST Left 2/25/2013    Performed by Trery Moreno MD at [facility] OR 2ND FLR    INSERTION-CATHETER-DIALYSIS Right 4/23/2018    Performed by Jenae Salguero DO at [facility] OR    INSERTION-CATHETER-BRENNEN CATH - C-ARM N/A 11/11/2016    Performed by Bobby Blount Jr., MD at [facility] [place]    left nephrectomy      atrophic kidney and hydronephrosis    left oopherectomy  2001    MASTECTOMY  2013    left    MASTECTOMY Left 2/25/2013    Performed by Dirk Oneil MD at [facility] OR 2ND FLR    MASTOPEXY Right  2/25/2013    Performed by Terry Moreno MD at [facility] OR 2ND FLR    NEPHRECTOMY  2011    lap    RADIOFREQUENCY THERMOCOAGULATION (RFTC)-NERVE-MEDIAN BRANCH-LUMBAR Left 3/30/2016    Performed by Stacie Negrete MD at [facility]    RADIOFREQUENCY THERMOCOAGULATION (RFTC)-NERVE-MEDIAN BRANCH-LUMBAR Right 3/16/2016    Performed by [facility]te MD at [facility]    RECONSTRUCTION, BREAST, WITH LATISSIMUS DORSI MYOCUTANEOUS FLAP Left 4/11/2013    Performed by Terry Moreno MD at [facility] OR [facility] FLR    RECONSTRUCTION-NIPPLE Left 1/9/2014    Performed by Terry Moreno MD at [facility] OR [facility] FLR    REPAIR, HERNIA, VENTRAL, LAPAROSCOPIC N/A 3/28/2014    Performed by Min Polanco MD at [facility] OR [facility] FLR    REVISION Bilateral 1/9/2014    Performed by Terry Moreno MD at [facility] OR [facility] FLR    REVISION, AV FISTULA Right 8/15/2018    Performed by RICK Pollard III, MD at [facility] OR [facility] FLR    SIGMOIDECTOMY  2001    TOTAL REDUCTION MAMMOPLASTY Right 2013    TRANSPOSITION, VEIN Right 8/15/2018    Performed by RICK Pollard III, MD at [facility] OR [facility] FLR       MEDS:  Medcard reviewed and updated    ALLERGIES: Allergy Card reviewed and updated    SOCIAL HISTORY:   The patient is a nonsmoker.    PE:   APPEARANCE: Well nourished, well developed, in no acute distress.    CHEST: Lungs clear to auscultation with unlabored respirations.  CARDIOVASCULAR: Normal S1, S2. No murmurs. No carotid bruits. No pedal edema.  ABDOMEN: Bowel sounds normal. Not distended. Soft. No tenderness or masses.   MUSCULOSKELETAL:  Abnormal gait, positive tenderness to palpation of lower back.  PSYCHIATRIC: The patient is oriented to person, place, and time and has a pleasant affect.        ASSESSMENT/PLAN:  Kylie was seen today for diabetes.    Diagnoses and all orders for this visit:    Spinal stenosis of lumbar region with neurogenic claudication  -     trial of Norflex    Essential hypertension  -      blood pressure is okay     Other orders  -     orphenadrine (NORFLEX) 100 mg tablet; Take 1 tablet (100 mg total) by mouth 2 (two) times daily as needed for Muscle spasms.

## 2019-02-13 ENCOUNTER — HOSPITAL ENCOUNTER (OUTPATIENT)
Facility: HOSPITAL | Age: 64
Discharge: NURSING, REHAB OR LONG-TERM CARE | End: 2019-02-20
Attending: EMERGENCY MEDICINE | Admitting: INTERNAL MEDICINE
Payer: COMMERCIAL

## 2019-02-13 DIAGNOSIS — J06.9 UPPER RESPIRATORY INFECTION, ACUTE: ICD-10-CM

## 2019-02-13 DIAGNOSIS — Z99.2 HEMODIALYSIS STATUS: ICD-10-CM
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
## 2019-02-13 DIAGNOSIS — Z74.09 IMPAIRED FUNCTIONAL MOBILITY AND ENDURANCE: ICD-10-CM

## 2019-02-13 DIAGNOSIS — M48.061 SPINAL STENOSIS OF LUMBAR REGION, UNSPECIFIED WHETHER NEUROGENIC CLAUDICATION PRESENT: ICD-10-CM

## 2019-02-13 DIAGNOSIS — M25.561 ACUTE PAIN OF BOTH KNEES: Primary | ICD-10-CM

## 2019-02-13 DIAGNOSIS — E83.39 HYPERPHOSPHATEMIA: ICD-10-CM

## 2019-02-13 DIAGNOSIS — N18.6 ANEMIA IN ESRD (END-STAGE RENAL DISEASE): ICD-10-CM

## 2019-02-13 DIAGNOSIS — I15.0 SECONDARY RENOVASCULAR HYPERTENSION, MALIGNANT: ICD-10-CM

## 2019-02-13 DIAGNOSIS — D63.1 ANEMIA IN ESRD (END-STAGE RENAL DISEASE): ICD-10-CM

## 2019-02-13 DIAGNOSIS — N18.6 ESRD (END STAGE RENAL DISEASE) ON DIALYSIS: ICD-10-CM

## 2019-02-13 DIAGNOSIS — Z99.2 ESRD ON HEMODIALYSIS: ICD-10-CM

## 2019-02-13 DIAGNOSIS — R73.9 HYPERGLYCEMIA WITHOUT KETOSIS: ICD-10-CM

## 2019-02-13 DIAGNOSIS — R29.6 FREQUENT FALLS: ICD-10-CM

## 2019-02-13 DIAGNOSIS — Z99.2 ESRD (END STAGE RENAL DISEASE) ON DIALYSIS: ICD-10-CM

## 2019-02-13 DIAGNOSIS — M62.89 MUSCULAR IMBALANCE: ICD-10-CM

## 2019-02-13 DIAGNOSIS — M15.9 PRIMARY OSTEOARTHRITIS INVOLVING MULTIPLE JOINTS: ICD-10-CM

## 2019-02-13 DIAGNOSIS — M25.562 ACUTE PAIN OF BOTH KNEES: Primary | ICD-10-CM

## 2019-02-13 DIAGNOSIS — N18.6 ESRD ON HEMODIALYSIS: ICD-10-CM

## 2019-02-13 DIAGNOSIS — E87.5 HYPERKALEMIA: ICD-10-CM

## 2019-02-13 DIAGNOSIS — E78.00 HYPERCHOLESTEROLEMIA: ICD-10-CM

## 2019-02-13 LAB
ALBUMIN SERPL BCP-MCNC: 3.5 G/DL
ALLENS TEST: ABNORMAL
ALLENS TEST: ABNORMAL
ALP SERPL-CCNC: 81 U/L
ALT SERPL W/O P-5'-P-CCNC: 19 U/L
ANION GAP SERPL CALC-SCNC: 20 MMOL/L
AST SERPL-CCNC: 19 U/L
BASOPHILS # BLD AUTO: 0.04 K/UL
BASOPHILS NFR BLD: 0.6 %
BILIRUB SERPL-MCNC: 0.6 MG/DL
BUN SERPL-MCNC: 80 MG/DL
CALCIUM SERPL-MCNC: 8.5 MG/DL
CHLORIDE SERPL-SCNC: 93 MMOL/L
CO2 SERPL-SCNC: 24 MMOL/L
CREAT SERPL-MCNC: 13.7 MG/DL
DELSYS: ABNORMAL
DIFFERENTIAL METHOD: ABNORMAL
EOSINOPHIL # BLD AUTO: 0.1 K/UL
EOSINOPHIL NFR BLD: 2 %
ERYTHROCYTE [DISTWIDTH] IN BLOOD BY AUTOMATED COUNT: 17.3 %
EST. GFR  (AFRICAN AMERICAN): 2.9 ML/MIN/1.73 M^2
EST. GFR  (NON AFRICAN AMERICAN): 2.5 ML/MIN/1.73 M^2
FLOW: 2
GLUCOSE SERPL-MCNC: 179 MG/DL
HCO3 UR-SCNC: 23.6 MMOL/L (ref 24–28)
HCO3 UR-SCNC: 26.3 MMOL/L (ref 24–28)
HCT VFR BLD AUTO: 31.8 %
HGB BLD-MCNC: 10 G/DL
IMM GRANULOCYTES # BLD AUTO: 0.03 K/UL
IMM GRANULOCYTES NFR BLD AUTO: 0.5 %
LYMPHOCYTES # BLD AUTO: 1.2 K/UL
LYMPHOCYTES NFR BLD: 19.5 %
MAGNESIUM SERPL-MCNC: 2.4 MG/DL
MCH RBC QN AUTO: 28.9 PG
MCHC RBC AUTO-ENTMCNC: 31.4 G/DL
MCV RBC AUTO: 92 FL
MODE: ABNORMAL
MONOCYTES # BLD AUTO: 0.5 K/UL
MONOCYTES NFR BLD: 7.9 %
NEUTROPHILS # BLD AUTO: 4.4 K/UL
NEUTROPHILS NFR BLD: 69.5 %
NRBC BLD-RTO: 0 /100 WBC
PCO2 BLDA: 32.1 MMHG (ref 35–45)
PCO2 BLDA: 39.2 MMHG (ref 35–45)
PH SMN: 7.43 [PH] (ref 7.35–7.45)
PH SMN: 7.47 [PH] (ref 7.35–7.45)
PHOSPHATE SERPL-MCNC: 8 MG/DL
PLATELET # BLD AUTO: 202 K/UL
PMV BLD AUTO: 10.6 FL
PO2 BLDA: 107 MMHG (ref 80–100)
PO2 BLDA: 42 MMHG (ref 40–60)
POC BE: 0 MMOL/L
POC BE: 2 MMOL/L
POC SATURATED O2: 81 % (ref 95–100)
POC SATURATED O2: 98 % (ref 95–100)
POC TCO2: 25 MMOL/L (ref 24–29)
POC TCO2: 28 MMOL/L (ref 23–27)
POCT GLUCOSE: 151 MG/DL (ref 70–110)
POTASSIUM SERPL-SCNC: 5.4 MMOL/L
PROT SERPL-MCNC: 7.4 G/DL
RBC # BLD AUTO: 3.46 M/UL
SAMPLE: ABNORMAL
SAMPLE: ABNORMAL
SITE: ABNORMAL
SITE: ABNORMAL
SODIUM SERPL-SCNC: 137 MMOL/L
SP02: 94
WBC # BLD AUTO: 6.35 K/UL

## 2019-02-13 PROCEDURE — G0378 HOSPITAL OBSERVATION PER HR: HCPCS

## 2019-02-13 PROCEDURE — 84100 ASSAY OF PHOSPHORUS: CPT

## 2019-02-13 PROCEDURE — 93010 ELECTROCARDIOGRAM REPORT: CPT | Mod: ,,, | Performed by: INTERNAL MEDICINE

## 2019-02-13 PROCEDURE — 85025 COMPLETE CBC W/AUTO DIFF WBC: CPT

## 2019-02-13 PROCEDURE — 93005 ELECTROCARDIOGRAM TRACING: CPT

## 2019-02-13 PROCEDURE — 99285 EMERGENCY DEPT VISIT HI MDM: CPT | Mod: 25

## 2019-02-13 PROCEDURE — 99220 PR INITIAL OBSERVATION CARE,LEVL III: ICD-10-PCS | Mod: ,,, | Performed by: INTERNAL MEDICINE

## 2019-02-13 PROCEDURE — 82803 BLOOD GASES ANY COMBINATION: CPT

## 2019-02-13 PROCEDURE — 25000003 PHARM REV CODE 250: Performed by: INTERNAL MEDICINE

## 2019-02-13 PROCEDURE — 82962 GLUCOSE BLOOD TEST: CPT

## 2019-02-13 PROCEDURE — 36600 WITHDRAWAL OF ARTERIAL BLOOD: CPT

## 2019-02-13 PROCEDURE — 93010 EKG 12-LEAD: ICD-10-PCS | Mod: ,,, | Performed by: INTERNAL MEDICINE

## 2019-02-13 PROCEDURE — 83735 ASSAY OF MAGNESIUM: CPT

## 2019-02-13 PROCEDURE — 80053 COMPREHEN METABOLIC PANEL: CPT

## 2019-02-13 PROCEDURE — 25000003 PHARM REV CODE 250: Performed by: EMERGENCY MEDICINE

## 2019-02-13 PROCEDURE — 99220 PR INITIAL OBSERVATION CARE,LEVL III: CPT | Mod: ,,, | Performed by: INTERNAL MEDICINE

## 2019-02-13 PROCEDURE — 99285 EMERGENCY DEPT VISIT HI MDM: CPT | Mod: ,,, | Performed by: EMERGENCY MEDICINE

## 2019-02-13 PROCEDURE — 99285 PR EMERGENCY DEPT VISIT,LEVEL V: ICD-10-PCS | Mod: ,,, | Performed by: EMERGENCY MEDICINE

## 2019-02-13 RX ORDER — ONDANSETRON 8 MG/1
8 TABLET, ORALLY DISINTEGRATING ORAL EVERY 8 HOURS PRN
Status: DISCONTINUED | OUTPATIENT
Start: 2019-02-13 | End: 2019-02-20 | Stop reason: HOSPADM

## 2019-02-13 RX ORDER — INSULIN ASPART 100 [IU]/ML
0-5 INJECTION, SOLUTION INTRAVENOUS; SUBCUTANEOUS
Status: DISCONTINUED | OUTPATIENT
Start: 2019-02-13 | End: 2019-02-20 | Stop reason: HOSPADM

## 2019-02-13 RX ORDER — INSULIN ASPART 100 [IU]/ML
9 INJECTION, SOLUTION INTRAVENOUS; SUBCUTANEOUS
Status: DISCONTINUED | OUTPATIENT
Start: 2019-02-14 | End: 2019-02-15

## 2019-02-13 RX ORDER — FLUTICASONE PROPIONATE 50 MCG
2 SPRAY, SUSPENSION (ML) NASAL DAILY
Status: DISCONTINUED | OUTPATIENT
Start: 2019-02-14 | End: 2019-02-20 | Stop reason: HOSPADM

## 2019-02-13 RX ORDER — SODIUM CHLORIDE 9 MG/ML
INJECTION, SOLUTION INTRAVENOUS ONCE
Status: DISCONTINUED | OUTPATIENT
Start: 2019-02-13 | End: 2019-02-13

## 2019-02-13 RX ORDER — ACETAMINOPHEN 325 MG/1
650 TABLET ORAL
Status: COMPLETED | OUTPATIENT
Start: 2019-02-13 | End: 2019-02-13

## 2019-02-13 RX ORDER — ACETAMINOPHEN 500 MG
500 TABLET ORAL EVERY 6 HOURS PRN
Status: DISCONTINUED | OUTPATIENT
Start: 2019-02-13 | End: 2019-02-20 | Stop reason: HOSPADM

## 2019-02-13 RX ORDER — RAMELTEON 8 MG/1
8 TABLET ORAL NIGHTLY PRN
Status: DISCONTINUED | OUTPATIENT
Start: 2019-02-13 | End: 2019-02-20 | Stop reason: HOSPADM

## 2019-02-13 RX ORDER — AMLODIPINE BESYLATE 10 MG/1
10 TABLET ORAL EVERY MORNING
Status: DISCONTINUED | OUTPATIENT
Start: 2019-02-14 | End: 2019-02-20 | Stop reason: HOSPADM

## 2019-02-13 RX ORDER — CALCIUM ACETATE 667 MG/1
667 CAPSULE ORAL
Status: DISCONTINUED | OUTPATIENT
Start: 2019-02-14 | End: 2019-02-17

## 2019-02-13 RX ORDER — AMOXICILLIN 250 MG
1 CAPSULE ORAL 2 TIMES DAILY
Status: DISCONTINUED | OUTPATIENT
Start: 2019-02-13 | End: 2019-02-20 | Stop reason: HOSPADM

## 2019-02-13 RX ORDER — IBUPROFEN 200 MG
24 TABLET ORAL
Status: DISCONTINUED | OUTPATIENT
Start: 2019-02-13 | End: 2019-02-20 | Stop reason: HOSPADM

## 2019-02-13 RX ORDER — CITALOPRAM 20 MG/1
20 TABLET, FILM COATED ORAL NIGHTLY
Status: DISCONTINUED | OUTPATIENT
Start: 2019-02-13 | End: 2019-02-20 | Stop reason: HOSPADM

## 2019-02-13 RX ORDER — LETROZOLE 2.5 MG/1
2.5 TABLET, FILM COATED ORAL NIGHTLY
Status: DISCONTINUED | OUTPATIENT
Start: 2019-02-13 | End: 2019-02-20 | Stop reason: HOSPADM

## 2019-02-13 RX ORDER — HEPARIN SODIUM 5000 [USP'U]/ML
5000 INJECTION, SOLUTION INTRAVENOUS; SUBCUTANEOUS EVERY 8 HOURS
Status: DISCONTINUED | OUTPATIENT
Start: 2019-02-13 | End: 2019-02-20 | Stop reason: HOSPADM

## 2019-02-13 RX ORDER — ACETAMINOPHEN AND CODEINE PHOSPHATE 300; 30 MG/1; MG/1
1 TABLET ORAL EVERY 8 HOURS PRN
Status: DISCONTINUED | OUTPATIENT
Start: 2019-02-13 | End: 2019-02-20 | Stop reason: HOSPADM

## 2019-02-13 RX ORDER — OXYCODONE HYDROCHLORIDE 5 MG/1
5 TABLET ORAL EVERY 6 HOURS PRN
Status: DISCONTINUED | OUTPATIENT
Start: 2019-02-13 | End: 2019-02-13

## 2019-02-13 RX ORDER — SODIUM CHLORIDE 0.9 % (FLUSH) 0.9 %
5 SYRINGE (ML) INJECTION
Status: DISCONTINUED | OUTPATIENT
Start: 2019-02-13 | End: 2019-02-13

## 2019-02-13 RX ORDER — CARVEDILOL 25 MG/1
25 TABLET ORAL 2 TIMES DAILY
Status: DISCONTINUED | OUTPATIENT
Start: 2019-02-13 | End: 2019-02-20 | Stop reason: HOSPADM

## 2019-02-13 RX ORDER — SODIUM CHLORIDE 0.9 % (FLUSH) 0.9 %
5 SYRINGE (ML) INJECTION
Status: DISCONTINUED | OUTPATIENT
Start: 2019-02-13 | End: 2019-02-20 | Stop reason: HOSPADM

## 2019-02-13 RX ORDER — ATORVASTATIN CALCIUM 20 MG/1
40 TABLET, FILM COATED ORAL DAILY
Status: DISCONTINUED | OUTPATIENT
Start: 2019-02-14 | End: 2019-02-20 | Stop reason: HOSPADM

## 2019-02-13 RX ORDER — GLUCAGON 1 MG
1 KIT INJECTION
Status: DISCONTINUED | OUTPATIENT
Start: 2019-02-13 | End: 2019-02-20 | Stop reason: HOSPADM

## 2019-02-13 RX ORDER — ORPHENADRINE CITRATE 100 MG/1
100 TABLET, EXTENDED RELEASE ORAL 2 TIMES DAILY PRN
Status: DISCONTINUED | OUTPATIENT
Start: 2019-02-13 | End: 2019-02-20 | Stop reason: HOSPADM

## 2019-02-13 RX ORDER — ERGOCALCIFEROL 1.25 MG/1
50000 CAPSULE ORAL
Status: DISCONTINUED | OUTPATIENT
Start: 2019-02-14 | End: 2019-02-20 | Stop reason: HOSPADM

## 2019-02-13 RX ORDER — SODIUM CHLORIDE 9 MG/ML
INJECTION, SOLUTION INTRAVENOUS
Status: DISCONTINUED | OUTPATIENT
Start: 2019-02-13 | End: 2019-02-19

## 2019-02-13 RX ORDER — IPRATROPIUM BROMIDE AND ALBUTEROL SULFATE 2.5; .5 MG/3ML; MG/3ML
3 SOLUTION RESPIRATORY (INHALATION) EVERY 4 HOURS PRN
Status: DISCONTINUED | OUTPATIENT
Start: 2019-02-13 | End: 2019-02-20 | Stop reason: HOSPADM

## 2019-02-13 RX ORDER — HYDRALAZINE HYDROCHLORIDE 25 MG/1
25 TABLET, FILM COATED ORAL 2 TIMES DAILY
Status: DISCONTINUED | OUTPATIENT
Start: 2019-02-13 | End: 2019-02-20 | Stop reason: HOSPADM

## 2019-02-13 RX ORDER — IBUPROFEN 200 MG
16 TABLET ORAL
Status: DISCONTINUED | OUTPATIENT
Start: 2019-02-13 | End: 2019-02-20 | Stop reason: HOSPADM

## 2019-02-13 RX ORDER — PROMETHAZINE HYDROCHLORIDE 12.5 MG/1
12.5 TABLET ORAL EVERY 6 HOURS PRN
Status: DISCONTINUED | OUTPATIENT
Start: 2019-02-13 | End: 2019-02-20 | Stop reason: HOSPADM

## 2019-02-13 RX ADMIN — ACETAMINOPHEN 650 MG: 325 TABLET ORAL at 12:02

## 2019-02-13 NOTE — ED NOTES
Patient identifiers verified and correct for Ms King  C/C: Weakness, frequent falls  APPEARANCE: awake and alert in NAD.  SKIN: warm, dry, bruises to right fingers   MUSCULOSKELETAL: Patient moving all extremities spontaneously, redness to BLE. Ambulates with max assist, uses cane and walker  RESPIRATORY: Denies shortness of breath.Respirations unlabored.   CARDIAC: Denies CP, 2+ distal pulses; no peripheral edema  ABDOMEN: S/ND/NT, Denies nausea  : intermittent urination, on dialysis missed 2 treatments. Dialysis access right arm  Neurologic: AAO x 4; follows commands equal strength in all extremities; denies numbness/tingling. Denies dizziness Positive gen weakness

## 2019-02-13 NOTE — ED TRIAGE NOTES
"Patient states she has fallen 7 times in 2 weeks with pain from right knee to ankle x 2 weeks. States left knee "not locking" she puts weight on it and it does not "catch" Tylenol #3 yesterday.  Supposed to get dialysis M,W,F, has not had dialysis since last Friday. Was going to Dialysis today, attempting to get down steps, fell down 3 steps at 0630. Pain currently behind right knee and right calf.  "

## 2019-02-13 NOTE — ED NOTES
Pt lethargic, but easy to arouse with voice stimuli  Unstimulated, falls asleep with noted snoring.

## 2019-02-13 NOTE — ED PROVIDER NOTES
"Encounter Date: 2/13/2019    SCRIBE #1 NOTE: I, Son Marcia, am scribing for, and in the presence of,  Dr. Garcia . I have scribed the following portions of the note - Other sections scribed: HPI ROS PE MDM.       History     Chief Complaint   Patient presents with    Fall     fell going down 1 step going to dialysis, off muscle relaxants 4 days for leg pain and cramping     Time patient was seen by the provider: 11:22 AM      The patient is a 63 y.o. female s/p mechanical falls with past medical history of sciatica, spinal stenosis, ESRD HD, DM2 who presents to the ED for bilateral knee pain x1 day. Patient with a history of sciatica states that she has been having right leg numbness for the past two weeks. She was prescribed a muscle relaxer for the pain, but she notes that it causes her legs to feel weak. She states that she is able to ambulate using her walker; however, every now and then her left knee is unable to lock up causing her to fall. She states that in the last two weeks she has fallen multiple times (8 total). She denies any light-headedness or dizziness before the fall and notes that she is falling because she "loses balance." Patient is [facility] dialysis patient and was last dialyzed last Friday. She skipped Monday's session because it was raining, and she was afraid to slip and fall. However, this morning as she was going to go to dialysis she reports of losing balance and falling on both of her knees. Denies any head trauma or LOC. She describes her pain as a severe pain that worsens when palpated. She denies any numbness or tingling to her toes. She also endorses shortness of breath when she was trying to get herself back up.       The history is provided by the patient and medical records.     Review of patient's allergies indicates:   Allergen Reactions    Cyclobenzaprine Hallucinations    Erythromycin      Stomach upset    Keflex [cephalexin]      Yeast infection     Past Medical History: "   Diagnosis Date    Anxiety     Arthritis     DDD, Lumbar    Breast cancer 1/2013    left breast- invasive mammary carcinoma, ER/PA positive, Her2 negative    Carotid artery stenosis 8/13/2018 6/22/2018: Carotid Duplex: SHANELL: Moderate plaquing - 2.0 m/s - <50%. LICA: Moderate plaquing - 1.6 m/s - <50%.    Cataract     Choledocholithiasis     s/p ERCP and stent placement    Chronic obstructive pulmonary disease 6/8/2018    Chronic venous insufficiency     Colon cancer 2001    CRI (chronic renal insufficiency)     one kidney    Dialysis AV fistula malfunction     ESRD (end stage renal disease) on dialysis     Former smoker 6/8/2018    GERD (gastroesophageal reflux disease)     History of acute pancreatitis 2009 2/09 s/p sphincterotomy    History of cerebrovascular accident 6/8/2018    History of colon cancer     s/p sigmoid colectomy    HTN (hypertension), benign     Hypercholesterolemia     Hyperlipidemia     Hypoxemia 6/8/2018    Intracerebral hemorrhage     Kidney stone     left    Lymphedema of both lower extremities     Obesity     Pancreatitis     Pancreatitis 2008    Physical deconditioning     Pulmonary edema     Sacroiliac joint pain     Spinal stenosis     Spinal stenosis, lumbar     Stroke 12/2012    Tobacco abuse     Type 2 diabetes mellitus with neurological manifestations, controlled     Neuropathy     Past Surgical History:   Procedure Laterality Date    AMPUTATION-TOE, partial Left 11/25/2016    Performed by Jose Delacruz [facility] DPM at [facility] OR 2ND FLR    BIOPSY, LYMPH NODE, SENTINEL Left 2/25/2013    Performed by Dirk Oneil MD at [facility] OR 2ND FLR    BLOCK-NERVE-MEDIAL BRANCH-LUMBAR Bilateral 3/1/2016    Performed by [facility]ie Negrete MD at [facility] PAIN MGT    BLOCK-NERVE-MEDIAL BRANCH-LUMBAR Bilateral 2/23/2016    Performed by Stacie Negrete MD at [facility] PAIN MGT    BREAST BIOPSY  1/2012    left breast invasive mammary carcinoma (lateral bx) and  intraductal papilloma (medial bx)    BREAST BIOPSY  1999    rt breast FA    CHOLECYSTECTOMY  2001    COLON SURGERY      CREATION -FISTULA-AV Right 5/28/2018    Performed by RICK Pollard III, MD at [facility] OR 2ND FLR    EXTRACTION, CATARACT, WITH IOL INSERTION Left 1/31/2019    Performed by Immanuel Moncada MD at [facility] OR    EXTRACTION, CATARACT, WITH IOL INSERTION Right 1/24/2019    Performed by Immanuel Moncada MD at [facility] OR    HERNIA REPAIR      INJECTION, FOR SENTINEL NODE IDENTIFICATION Left 2/25/2013    Performed by Dirk Oneil MD at [facility] OR 2ND FLR    INJECTION-JOINT Bilateral 1/20/2016    Performed by Stacie Negrete MD at [facility] PAIN MGT    INSERTION, TISSUE EXPANDER, BREAST Left 2/25/2013    Performed by Terry Moreno MD at [facility] OR 2ND FLR    INSERTION-CATHETER-DIALYSIS Right 4/23/2018    Performed by Jenae Salguero DO at [facility] OR    INSERTION-CATHETER-BRENNEN CATH - C-ARM N/A 11/11/2016    Performed by Bobby Blount Jr., MD at [facility] OR    left nephrectomy      atrophic kidney and hydronephrosis    left oopherectomy  2001    MASTECTOMY  2013    left    MASTECTOMY Left 2/25/2013    Performed by Dirk Oneil MD at [facility] OR 2ND FLR    MASTOPEXY Right 2/25/2013    Performed by Terry Moreno MD at [facility] OR 2ND FLR    NEPHRECTOMY  2011    lap    RADIOFREQUENCY THERMOCOAGULATION (RFTC)-NERVE-MEDIAN BRANCH-LUMBAR Left 3/30/2016    Performed by Stacie Negrete MD at [facility] PAIN MGT    RADIOFREQUENCY THERMOCOAGULATION (RFTC)-NERVE-MEDIAN BRANCH-LUMBAR Right 3/16/2016    Performed by Stacie Negrete MD at [facility] PAIN MGT    RECONSTRUCTION, BREAST, WITH LATISSIMUS DORSI MYOCUTANEOUS FLAP Left 4/11/2013    Performed by Terry Moreno MD at [facility] OR 2ND FLR    RECONSTRUCTION-NIPPLE Left 1/9/2014    Performed by Terry Moreno MD at [facility] OR 2ND FLR    REPAIR, HERNIA, VENTRAL, LAPAROSCOPIC N/A 3/28/2014    Performed by Min Polanco MD at [facility]  OR 2ND FLR    REVISION Bilateral 2014    Performed by [facility]no MD at [facility] OR 2ND FLR    REVISION, AV FISTULA Right 8/15/2018    Performed by RICK Pollard III, MD at [facility] OR 2ND FLR    SIGMOIDECTOMY  2001    TOTAL REDUCTION MAMMOPLASTY Right 2013    TRANSPOSITION, VEIN Right 8/15/2018    Performed by RICK Pollard III, MD at [facility] OR 2ND FLR     Family History   Problem Relation Age of Onset    Arthritis Mother     Heart disease Mother     Diabetes Father     Heart disease Father     Celiac disease Sister     Breast cancer Maternal Grandmother         possible-  patient unsure    Cancer Maternal Grandmother     Heart disease Maternal Grandmother     Cancer Maternal Aunt     Ovarian cancer Neg Hx      Social History     Tobacco Use    Smoking status: Current Some Day Smoker     Packs/day: 0.50     Years: 28.00     Pack years: 14.00     Types: Cigarettes     Start date: 1990     Last attempt to quit: 2018     Years since quittin.1    Smokeless tobacco: Never Used    Tobacco comment: anxious   Substance Use Topics    Alcohol use: No    Drug use: No     Review of Systems   Constitutional: Negative for chills and fever.   HENT: Negative for drooling.    Eyes: Negative for visual disturbance.   Respiratory: Positive for shortness of breath.    Cardiovascular: Negative for chest pain.   Gastrointestinal: Negative for diarrhea, nausea and vomiting.   Genitourinary: Negative for dysuria.   Musculoskeletal: Negative for back pain.        + Bilateral knee pain    Skin: Negative for rash.   Neurological: Positive for weakness. Negative for headaches.       Physical Exam     Initial Vitals [19 1109]   BP Pulse Resp Temp SpO2   (!) 192/100 91 18 97.7 °F (36.5 °C) 96 %      MAP       --         Physical Exam    Vitals reviewed.  Constitutional: No distress.   64 y/o obese  woman in no acute distress noted.    HENT:   Head: Normocephalic and atraumatic.   No  intraoral lesions noted  Redundant soft palate noted.    Eyes: EOM are normal.   Neck: No JVD present.   trachea midline and no JVD or stridor.    Cardiovascular: Normal rate, regular rhythm, normal heart sounds and intact distal pulses.   Pulmonary/Chest: No stridor. No respiratory distress.   Clear breath sounds bilaterally without respiratory distress.   Abdominal: Soft. She exhibits no distension. There is no tenderness.   obese   Musculoskeletal: She exhibits edema and tenderness.   Dialysis port noted to the right forearm with palpable thrill   Bilateral anterior knee tenderness noted without external evidence of trauma or deformity.  1+ non pitting lower extremity edema is noted bilaterally   Neurological: She is alert and oriented to person, place, and time. GCS score is 15. GCS eye subscore is 4. GCS verbal subscore is 5. GCS motor subscore is 6.   Skin:   chronic venous stasis changes noted to the bilateral lower extremity without  open wounds or erythema   Psychiatric: Her behavior is normal. Thought content normal.         ED Course   Procedures  Labs Reviewed   CBC W/ AUTO DIFFERENTIAL - Abnormal; Notable for the following components:       Result Value    RBC 3.46 (*)     Hemoglobin 10.0 (*)     Hematocrit 31.8 (*)     MCHC 31.4 (*)     RDW 17.3 (*)     All other components within normal limits   COMPREHENSIVE METABOLIC PANEL - Abnormal; Notable for the following components:    Potassium 5.4 (*)     Chloride 93 (*)     Glucose 179 (*)     BUN, Bld 80 (*)     Creatinine 13.7 (*)     Calcium 8.5 (*)     Anion Gap 20 (*)     eGFR if  2.9 (*)     eGFR if non  2.5 (*)     All other components within normal limits   PHOSPHORUS - Abnormal; Notable for the following components:    Phosphorus 8.0 (*)     All other components within normal limits   ISTAT PROCEDURE - Abnormal; Notable for the following components:    POC PH 7.475 (*)     POC PCO2 32.1 (*)     POC HCO3 23.6 (*)      POC SATURATED O2 81 (*)     All other components within normal limits   ISTAT PROCEDURE - Abnormal; Notable for the following components:    POC PO2 107 (*)     POC TCO2 28 (*)     All other components within normal limits   MAGNESIUM   URINALYSIS, REFLEX TO URINE CULTURE     EKG Readings: (Independently Interpreted)   Rhythm: Normal Sinus Rhythm. Heart Rate: 82 bpm . ST Segments: Normal ST Segments. Axis: Normal.       Imaging Results          MRI Brain Without Contrast (Final result)  Result time 02/13/19 14:37:19    Final result by Claus King MD (02/13/19 14:37:19)                 Impression:      Mildly degraded examination related to patient motion artifact.  No acute intracranial abnormality.    Remote pontine hemorrhage and remote microhemorrhages within the right thalamus and bilateral basal ganglia.    Sinus disease.    Findings of chronic microvascular ischemic disease.    Electronically signed by resident: Baljit Helton  Date:    02/13/2019  Time:    14:04    Electronically signed by: Claus King MD  Date:    02/13/2019  Time:    14:37             Narrative:    EXAMINATION:  MRI BRAIN WITHOUT CONTRAST    CLINICAL HISTORY:  frequent falls, hx stroke.    TECHNIQUE:  Multiplanar multisequence MR imaging of the brain was performed without contrast.  Examination limited by patient motion artifact.    COMPARISON:  MRI 01/28/2013; 12/21/2012    FINDINGS:  Overall examination is mild to moderately degraded by motion artifact.    Intracranial compartment: Ventricles are stable in size without evidence of hydrocephalus. No extra-axial blood or fluid collections. There is generalized cerebral volume loss.  Remote lacunar type infarct in the right basal ganglia.  Remote hemorrhage within the ruba and additional punctate remote microhemorrhages within the right thalamus and bilateral basal ganglia.  Moderate burden of patchy and confluent T2 hyperintense foci scattered in the supratentorial periventricular and  subcortical white matter as well as in the central ruba most likely relate to sequelae of chronic microvascular ischemic disease.  no mass lesion, acute hemorrhage, edema or acute infarct.    Normal vascular flow voids are preserved.    Skull/extracranial contents (limited evaluation): Bone marrow signal intensity is normal.  Near complete occlusion of the right maxillary antrum with patchy mucosal thickening elsewhere within the paranasal sinuses.                               X-Ray Knee 1 or 2 View Bilateral (Final result)  Result time 02/13/19 13:11:00    Final result by Bobby Duvall MD (02/13/19 13:11:00)                 Impression:      See above      Electronically signed by: Bobby Duvall MD  Date:    02/13/2019  Time:    13:11             Narrative:    EXAMINATION:  XR KNEE 1 OR 2 VIEW BILATERAL    CLINICAL HISTORY:  fall from standing;    COMPARISON:  None    FINDINGS:  DJD with significant narrowing of the joint spaces identified bilaterally.  No fracture or dislocation.  No bone destruction identified                               X-Ray Chest PA And Lateral (Final result)  Result time 02/13/19 13:10:29    Final result by Bobby Duvall MD (02/13/19 13:10:29)                 Impression:      See above      Electronically signed by: Bobby Duvall MD  Date:    02/13/2019  Time:    13:10             Narrative:    EXAMINATION:  XR CHEST PA AND LATERAL    CLINICAL HISTORY:  shortness of breath;    TECHNIQUE:  PA and lateral views of the chest were performed.    COMPARISON:  None    FINDINGS:  Mild cardiomegaly.  The lungs are clear.  No pleural effusion.                              X-Rays:   Independently Interpreted Readings:   Chest X-Ray: Mildly increased bilateral interstitial lung markings without consolidation or large effusions      Medical Decision Making:   History:   Old Medical Records: I decided to obtain old medical records.  Differential Diagnosis:   Frequent falls, subacute stroke, electrolyte  derangement, fluid overload   Independently Interpreted Test(s):   I have ordered and independently interpreted X-rays - see prior notes.  I have ordered and independently interpreted EKG Reading(s) - see prior notes  Clinical Tests:   Lab Tests: Ordered and Reviewed  Radiological Study: Ordered and Reviewed  Medical Tests: Ordered and Reviewed            Scribe Attestation:   Scribe #1: I performed the above scribed service and the documentation accurately describes the services I performed. I attest to the accuracy of the note.    Attending Attestation:             Attending ED Notes:   Emergency department evaluation today reveals baseline anemia of chronic disease, and metabolic profile reveals minimally elevated serum potassium with significant elevations of the BUN and creatinine in this patient who has failed to undergo dialysis today or Monday as scheduled.  Chest x-ray reveals evidence of mildly increased interstitial lung markings, and EKG does not reveal evidence of acute injury pattern.  Plain films of bilateral knees does not reveal any evidence of displaced fracture, and an MRI of the brain obtained due to frequent falls does not reveal any evidence of acute cerebral infarction.  Arterial blood gas reveals adequate oxygenation without significant hypercapnia.  Findings and concerns have been discussed with internal medicine on call, and the patient will be placed in observation under the care in order to undergo inpatient hemodialysis as well as PT/OT evaluation for her frequent falls, chronic arthritis, and morbid obesity.             Clinical Impression:   The primary encounter diagnosis was Acute pain of both knees. Diagnoses of Frequent falls, Hyperphosphatemia, Hyperkalemia, Hemodialysis status, and Hyperglycemia without ketosis were also pertinent to this visit.      Disposition:   Disposition: Placed in Observation  Condition: Stable                        Terry Garcia MD  02/13/19  [id number]

## 2019-02-13 NOTE — ED NOTES
Pt awake and alert; resting quietly on stretcher.  Pt remains on continuous cardiac and pulse ox monitoring with 2 liters of oxygen via nasal cannula. Non-invasive blood pressure to cycle every 30 minutes.  VS improved; NSR noted. Pt denies pain at this time; no acute distress or discomfort reported or observed.  Pt denies restroom needs at this time; is able to reposition self on stretcher.  Pt requesting to eat; ok'd per Dr Garcia. Spouse to obtain meal for pt. Bed locked in lowest position; side rails up and locked x 2; call light, bedside table, and personal belongings within reach. Room assessed for safety measures and cleanliness; no action needed at this time .Pt updated on plan for admission; awaiting bed assignment.  Pt instructed to alert nurse for assistance and before attempting to get out of bed; verbalizes understanding. Pt denies needs or complaints at this time; will continue to monitor.

## 2019-02-13 NOTE — H&P
"[facility]  History & Physical    Subjective:      Chief Complaint/Reason for Admission: Poor balance with frequent falls    History of present illness:  Ms. King is a 62yo lady with multiple past medical problems (see below), who has ESRD on HD M/W/F due to longstanding uncontrolled DM2 and renovascular HTN.  She has always had "bad balance" due to her obesity, age and neuropathy, but over the past year she has noticed an up-tick in her falls.  Over the past 4 months this has almost doubled, and now over the past few weeks she has fallen 8 times.  Her problems first started with symptoms of right sided sciatica pain (sharp pain radiating down the back of her leg) with weakness.  This led to her guarding the right side with overuse of her left leg.  After a week or so of this and still falling, she suddenly noticed that her left leg would, "just suddenly give out" on her.  She would go to make a step and on her left foot touching, it was, "like the leg just didn't have any strength and down I'd go"  Her right knee is extremely painful to any pressure or movement and still with some sciatica symptoms.  She is now having difficulty ambulating.  She has some right buttock pain as well, but no harry lumbar back pain.  She did see her PCP and an Orthopedist for these symptoms.  She was given a Rx for Norflex, which did seem to assist some with the pain, but actually made her balance significantly worse, so she stopped it after 3 days.    Due to the poor balance and fear of using the steps, she missed her HD session on Monday.  She was planning to go today, but again fell and struck her knees and her right lower ribcage.  She feels, "a little knot there now."  To complicate things, she also recently had cataract surgery (1/30/19), so this is impeding her vision to some degree (along with the dark sunglasses).    Past Medical History:   Diagnosis Date    Anxiety     Arthritis     DDD, Lumbar    " Breast cancer 1/2013    left breast- invasive mammary carcinoma, ER/ME positive, Her2 negative    Carotid artery stenosis 8/13/2018 6/22/2018: Carotid Duplex: SHANELL: Moderate plaquing - 2.0 m/s - <50%. LICA: Moderate plaquing - 1.6 m/s - <50%.    Cataract     Choledocholithiasis     s/p ERCP and stent placement    Chronic obstructive pulmonary disease 6/8/2018    Chronic venous insufficiency     Colon cancer 2001    CRI (chronic renal insufficiency)     one kidney    Dialysis AV fistula malfunction     ESRD (end stage renal disease) on dialysis     Former smoker 6/8/2018    GERD (gastroesophageal reflux disease)     History of acute pancreatitis 2009 2/09 s/p sphincterotomy    History of cerebrovascular accident 6/8/2018    History of colon cancer     s/p sigmoid colectomy    HTN (hypertension), benign     Hypercholesterolemia     Hyperlipidemia     Hypoxemia 6/8/2018    Intracerebral hemorrhage     Kidney stone     left    Lymphedema of both lower extremities     Obesity     Pancreatitis     Pancreatitis 2008    Physical deconditioning     Pulmonary edema     Sacroiliac joint pain     Spinal stenosis     Spinal stenosis, lumbar     Stroke 12/2012    Tobacco abuse     Type 2 diabetes mellitus with neurological manifestations, controlled     Neuropathy     Past Surgical History:   Procedure Laterality Date    AMPUTATION-TOE, partial Left 11/25/2016    Performed by Jose Delacruz Jr., DPM at [facility] OR 2ND FLR    BIOPSY, LYMPH NODE, SENTINEL Left 2/25/2013    Performed by Dirk Oneil MD at [facility] OR 2ND FLR    BLOCK-NERVE-MEDIAL BRANCH-LUMBAR Bilateral 3/1/2016    Performed by Stacie Negrete MD at [facility] MGT    BLOCK-NERVE-MEDIAL BRANCH-LUMBAR Bilateral 2/23/2016    Performed by Stacie Negrete MD at [facility] PAIN MGT    BREAST BIOPSY  1/2012    left breast invasive mammary carcinoma (lateral bx) and intraductal papilloma (medial bx)    BREAST BIOPSY  1999    rt  breast FA    CHOLECYSTECTOMY  2001    COLON SURGERY      CREATION -FISTULA-AV Right 5/28/2018    Performed by RICK Pollard III, MD at [facility] OR 2ND FLR    EXTRACTION, CATARACT, WITH IOL INSERTION Left 1/31/2019    Performed by Immanuel Moncada MD at [facility] OR    EXTRACTION, CATARACT, WITH IOL INSERTION Right 1/24/2019    Performed by Immanuel Moncada MD at [facility] OR    HERNIA REPAIR      INJECTION, FOR SENTINEL LYMPH NODE IDENTIFICATION Left 2/25/2013    Performed by Dirk Oneil MD at [facility] OR 2ND FLR    INJECTION-JOINT Bilateral 1/20/2016    Performed by Stacie Negrete MD at [facility] PAIN MGT    INSERTION, TISSUE EXPANDER, BREAST Left 2/25/2013    Performed by Terry Moreno MD at [facility] OR 2ND FLR    INSERTION-CATHETER-DIALYSIS Right 4/23/2018    Performed by Jenae Salguero DO at [facility] OR    INSERTION-CATHETER-BRENNEN CATH - C-ARM N/A 11/11/2016    Performed by Bobby Blount Jr., MD at [facility] OR    left nephrectomy      atrophic kidney and hydronephrosis    left oopherectomy  2001    MASTECTOMY  2013    left    MASTECTOMY Left 2/25/2013    Performed by Dirk Oneil MD at [facility] OR 2ND FLR    MASTOPEXY Right 2/25/2013    Performed by Terry Moreno MD at [facility] OR 2ND FLR    NEPHRECTOMY  2011    lap    RADIOFREQUENCY THERMOCOAGULATION (RFTC)-NERVE-MEDIAN BRANCH-LUMBAR Left 3/30/2016    Performed by Stacie eNgrete MD at [facility] PAIN MGT    RADIOFREQUENCY THERMOCOAGULATION (RFTC)-NERVE-MEDIAN BRANCH-LUMBAR Right 3/16/2016    Performed by Stacie Negrete MD at [facility] PAIN MGT    RECONSTRUCTION, BREAST, USING LATISSIMUS DORSI MYOCUTANEOUS FLAP Left 4/11/2013    Performed by Terry Moreno MD at [facility] OR 2ND FLR    RECONSTRUCTION-NIPPLE Left 1/9/2014    Performed by Terry Moreno MD at [facility] OR 2ND FLR    REPAIR, HERNIA, VENTRAL, LAPAROSCOPIC N/A 3/28/2014    Performed by Min Polanco MD at [facility] OR 2ND FLR    REVISION Bilateral 1/9/2014    Performed by  Terry Moreno MD at [facility] OR [facility] FLR    REVISION, AV FISTULA Right 8/15/2018    Performed by RICK Pollard III, MD at [facility] OR 2ND FLR    SIGMOIDECTOMY  2001    TOTAL REDUCTION MAMMOPLASTY Right 2013    TRANSPOSITION, VEIN Right 8/15/2018    Performed by RICK Pollard III, MD at [facility] OR 2ND FLR     Social History     Socioeconomic History    Marital status: Single     Spouse name: Not on file    Number of children: 2    Years of education: Not on file    Highest education level: Not on file   Social Needs    Financial resource strain: Not on file    Food insecurity - worry: Not on file    Food insecurity - inability: Not on file    Transportation needs - medical: Not on file    Transportation needs - non-medical: Not on file   Occupational History    Occupation: not working     Employer: [employer]   Tobacco Use    Smoking status: Current Some Day Smoker     Packs/day: 0.50     Years: 28.00     Pack years: 14.00     Types: Cigarettes     Start date: 1990     Last attempt to quit: 2018     Years since quittin.1    Smokeless tobacco: Never Used    Tobacco comment: anxious   Substance and Sexual Activity    Alcohol use: No    Drug use: No    Sexual activity: No     Partners: Male   Other Topics Concern    Not on file   Social History Narrative    She is not exercising regularly     Family History   Problem Relation Age of Onset    Arthritis Mother     Heart disease Mother     Diabetes Father     Heart disease Father     Celiac disease Sister     Breast cancer Maternal Grandmother         possible-  patient unsure    Cancer Maternal Grandmother     Heart disease Maternal Grandmother     Cancer Maternal Aunt     Ovarian cancer Neg Hx      Review of patient's allergies indicates:   Allergen Reactions    Cyclobenzaprine Hallucinations    Erythromycin      Stomach upset    Keflex [cephalexin]      Yeast infection       Current Facility-Administered Medications:  "    0.9%  NaCl infusion, , Intravenous, PRN, Darell Zhang MD    0.9%  NaCl infusion, , Intravenous, Once, Darell Zhang MD    Current Outpatient Medications:     acetaminophen-codeine 300-30mg (TYLENOL #3) 300-30 mg Tab, Take 1 tablet by mouth every 6 (six) hours as needed., Disp: 90 tablet, Rfl: 2    amLODIPine (NORVASC) 10 MG tablet, Take 1 tablet (10 mg total) by mouth every morning., Disp: 90 tablet, Rfl: 3    carvedilol (COREG) 25 MG tablet, Take 1 tablet (25 mg total) by mouth 2 (two) times daily., Disp: 180 tablet, Rfl: 3    citalopram (CELEXA) 20 MG tablet, Take 1 tablet (20 mg total) by mouth nightly., Disp: 90 tablet, Rfl: 3    gabapentin (NEURONTIN) 400 MG capsule, Take 1 capsule (400 mg total) by mouth every evening., Disp: 90 capsule, Rfl: 3    hydrALAZINE (APRESOLINE) 25 MG tablet, Take 1 tablet (25 mg total) by mouth 2 (two) times daily., Disp: 180 tablet, Rfl: 3    insulin detemir U-100 (LEVEMIR FLEXTOUCH U-100 INSULN) 100 unit/mL (3 mL) SubQ InPn pen, Inject 45 Units into the skin 2 (two) times daily., Disp: 30 mL, Rfl: 3    orphenadrine (NORFLEX) 100 mg tablet, Take 1 tablet (100 mg total) by mouth 2 (two) times daily as needed for Muscle spasms., Disp: 30 tablet, Rfl: 1    ranitidine (ZANTAC) 150 MG capsule, Take 1 capsule (150 mg total) by mouth nightly. (Patient taking differently: Take 150 mg by mouth nightly as needed. ), Disp: 90 capsule, Rfl: 3    albuterol 90 mcg/actuation inhaler, Inhale 2 puffs into the lungs every 6 (six) hours as needed for Wheezing. Rescue, Disp: 18 g, Rfl: 0    ammonium lactate 12 % Crea, Apply small amount to feet except for in between toes twice a day, Disp: 1 Bottle, Rfl: 3    atorvastatin (LIPITOR) 40 MG tablet, Take 1 tablet (40 mg total) by mouth once daily., Disp: 90 tablet, Rfl: 3    BD INSULIN PEN NEEDLE UF MINI 31 gauge x 3/16" Ndle, USE 5 NEEDLES PER DAY, Disp: 450 each, Rfl: 2    calcium acetate (PHOSLO) 667 mg " "tablet, Take 2 tablets by mouth 3 (three) times daily with meals., Disp: 540 tablet, Rfl: 3    ergocalciferol (ERGOCALCIFEROL) 50,000 unit Cap, Take 50,000 Units by mouth every 7 days. Takes on Wednesdays, Disp: , Rfl:     fish oil-omega-3 fatty acids 300-1,000 mg capsule, Take 1 capsule by mouth every morning., Disp: , Rfl:     fluticasone (FLONASE) 50 mcg/actuation nasal spray, 2 sprays (100 mcg total) by Each Nare route once daily., Disp: 1 Bottle, Rfl: 2    insulin lispro (HUMALOG KWIKPEN INSULIN) 100 unit/mL InPn pen, BLOOD GLUCOSE 150-200, INJECT 1 UNITS UNDER THE SKIN, 201-250, 2 UNITS, 251-300, 3 UNITS THREE TIMES A DAY AS DIRECTED, Disp: 1 Box, Rfl: 3    insulin needles, disposable, (PEN NEEDLE, DIABETIC) 31 Ndle, Patient needs 5 needles a day, Disp: 500 each, Rfl: 3    lancets (ONETOUCH DELICA LANCETS) 33 gauge Misc, 1 lancet by Misc.(Non-Drug; Combo Route) route 4 (four) times daily before meals and nightly., Disp: 400 each, Rfl: 4    letrozole (FEMARA) 2.5 mg Tab, Take 1 tablet (2.5 mg total) by mouth nightly., Disp: 90 tablet, Rfl: 3    lidocaine (LIDODERM) 5 %, Place 1 patch onto the skin once daily. Apply patch to affected area for 12 hours, then remove for 12 hours., Disp: 30 patch, Rfl: 0    loratadine (CLARITIN) 10 mg tablet, Take 1 tablet (10 mg total) by mouth once daily., Disp: 90 tablet, Rfl: 0    pen needle, diabetic 32 gauge x 5/32" Ndle, 1 Device by Misc.(Non-Drug; Combo Route) route 5 (five) times daily., Disp: 450 each, Rfl: 6    prednisolon/gatiflox/bromfenac (PREDNISOL ACE-GATIFLOX-BROMFEN) 1-0.5-0.075 % DrpS, Apply 1 drop to eye 3 (three) times daily., Disp: 7 mL, Rfl: 3    Facility-Administered Medications Ordered in Other Encounters:     mupirocin 2 % ointment, , Nasal, On Call Procedure, Casey Owens MD    Review of Systems:   Constitutional: Negative for chills, diaphoresis, fever, malaise/fatigue and weight loss.   HENT: Negative for congestion, ear pain, hearing loss, " nosebleeds and sore throat.    Eyes: positive for blurred vision, double vision and photophobia.   Respiratory: Negative for cough, hemoptysis, sputum production, shortness of breath, wheezing and stridor.    Cardiovascular: Negative for chest pain, palpitations, orthopnea, claudication, leg swelling and PND.   Gastrointestinal: Positive for abdominal pain, negative blood in stool, constipation, diarrhea, heartburn, melena, nausea and vomiting.   Genitourinary: Negative for dysuria, flank pain, frequency, hematuria and urgency.   Musculoskeletal: Negative for back pain, positive for falls, joint pain, negative for myalgias and neck pain.   Skin: Negative for itching and rash.   Neurological: Negative for dizziness, positive for tingling, tremors, sensory change, negative for speech change, focal weakness, seizures, loss of consciousness, weakness and headaches.   Endo/Heme/Allergies: Negative for polydipsia. Does not bruise/bleed easily.   Psychiatric/Behavioral: Negative for depression, hallucinations, memory loss, substance abuse and suicidal ideas. The patient is not nervous/anxious and does not have insomnia.      Vital signs:  Temp:  [97.7 °F (36.5 °C)] 97.7 °F (36.5 °C)  Pulse:  [74-91] 74  Resp:  [16-18] 16  SpO2:  [92 %-96 %] 95 %  BP: (167-215)/() 167/78    Physical Exam:  Physical Exam   Constitutional: She is oriented to person, place, and time. She appears well-developed and well-nourished.  Non-toxic appearance. She does not have a sickly appearance. She does not appear ill. No distress.   HENT:   Head: Normocephalic and atraumatic.   Mouth/Throat: No oropharyngeal exudate.   Eyes: Conjunctivae and EOM are normal. Pupils are equal, round, and reactive to light. Right eye exhibits no discharge. Left eye exhibits no discharge. No scleral icterus.   Neck: Normal range of motion. Neck supple. No JVD present. No tracheal deviation present. No thyromegaly present.   Cardiovascular: Normal rate, regular  rhythm, normal heart sounds and intact distal pulses. Exam reveals no gallop and no friction rub.   No murmur heard. Right arm AVF is in place with good thrill and bruit  Pulmonary/Chest: Effort normal and breath sounds normal. No stridor. No tachypnea and no bradypnea. No respiratory distress. She has no decreased breath sounds. She has no wheezes. She has no rhonchi. She has no rales. She exhibits no tenderness.   Abdominal: Soft. Bowel sounds are normal. She exhibits no distension and no mass. There is no tenderness. There is no rebound and no guarding. There is a small contusion palpated to the right upper abdomen, just below the rib cage.    Genitourinary:   Genitourinary Comments: No batista in place   Musculoskeletal: Limited range of motion of the hips and knees due to pain. She has a negative straight leg raise on the right.    Lymphadenopathy:     She has no cervical adenopathy.   2+ chronic peripheral edema with venous stasis changes  Neurological: She is alert and oriented to person, place, and time. She is not disoriented. She displays no tremor, No cranial nerve deficit. She exhibits normal muscle tone. Coordination normal. GCS eye subscore is 4. GCS verbal subscore is 5. GCS motor subscore is 6.   Skin: Skin is warm and dry. No rash noted. She is not diaphoretic. No erythema. No pallor.   Psychiatric: She has a normal mood and affect. Her speech is normal and behavior is normal. Judgment and thought content normal. Cognition and memory are normal.   Nursing note and vitals reviewed.      Data Review - All labs, radiographs and EKG's personally reviewed:    Labs:  Recent Results (from the past 24 hour(s))   CBC auto differential    Collection Time: 02/13/19 11:55 AM   Result Value Ref Range    WBC 6.35 3.90 - 12.70 K/uL    RBC 3.46 (L) 4.00 - 5.40 M/uL    Hemoglobin 10.0 (L) 12.0 - 16.0 g/dL    Hematocrit 31.8 (L) 37.0 - 48.5 %    MCV 92 82 - 98 fL    MCH 28.9 27.0 - 31.0 pg    MCHC 31.4 (L) 32.0 - 36.0  g/dL    RDW 17.3 (H) 11.5 - 14.5 %    Platelets 202 150 - 350 K/uL    MPV 10.6 9.2 - 12.9 fL    Immature Granulocytes 0.5 0.0 - 0.5 %    Gran # (ANC) 4.4 1.8 - 7.7 K/uL    Immature Grans (Abs) 0.03 0.00 - 0.04 K/uL    Lymph # 1.2 1.0 - 4.8 K/uL    Mono # 0.5 0.3 - 1.0 K/uL    Eos # 0.1 0.0 - 0.5 K/uL    Baso # 0.04 0.00 - 0.20 K/uL    nRBC 0 0 /100 WBC    Gran% 69.5 38.0 - 73.0 %    Lymph% 19.5 18.0 - 48.0 %    Mono% 7.9 4.0 - 15.0 %    Eosinophil% 2.0 0.0 - 8.0 %    Basophil% 0.6 0.0 - 1.9 %    Differential Method Automated    Comprehensive metabolic panel    Collection Time: 02/13/19 11:55 AM   Result Value Ref Range    Sodium 137 136 - 145 mmol/L    Potassium 5.4 (H) 3.5 - 5.1 mmol/L    Chloride 93 (L) 95 - 110 mmol/L    CO2 24 23 - 29 mmol/L    Glucose 179 (H) 70 - 110 mg/dL    BUN, Bld 80 (H) 8 - 23 mg/dL    Creatinine 13.7 (H) 0.5 - 1.4 mg/dL    Calcium 8.5 (L) 8.7 - 10.5 mg/dL    Total Protein 7.4 6.0 - 8.4 g/dL    Albumin 3.5 3.5 - 5.2 g/dL    Total Bilirubin 0.6 0.1 - 1.0 mg/dL    Alkaline Phosphatase 81 55 - 135 U/L    AST 19 10 - 40 U/L    ALT 19 10 - 44 U/L    Anion Gap 20 (H) 8 - 16 mmol/L    eGFR if African American 2.9 (A) >60 mL/min/1.73 m^2    eGFR if non African American 2.5 (A) >60 mL/min/1.73 m^2   Magnesium    Collection Time: 02/13/19 11:55 AM   Result Value Ref Range    Magnesium 2.4 1.6 - 2.6 mg/dL   Phosphorus    Collection Time: 02/13/19 11:55 AM   Result Value Ref Range    Phosphorus 8.0 (H) 2.7 - 4.5 mg/dL   ISTAT PROCEDURE    Collection Time: 02/13/19  4:00 PM   Result Value Ref Range    POC PH 7.475 (H) 7.35 - 7.45    POC PCO2 32.1 (L) 35 - 45 mmHg    POC PO2 42 40 - 60 mmHg    POC HCO3 23.6 (L) 24 - 28 mmol/L    POC BE 0 -2 to 2 mmol/L    POC SATURATED O2 81 (L) 95 - 100 %    POC TCO2 25 24 - 29 mmol/L    Sample VENOUS     Site Other     Allens Test N/A    ISTAT PROCEDURE    Collection Time: 02/13/19  4:37 PM   Result Value Ref Range    POC PH 7.435 7.35 - 7.45    POC PCO2 39.2 35 - 45  mmHg    POC PO2 107 (H) 80 - 100 mmHg    POC HCO3 26.3 24 - 28 mmol/L    POC BE 2 -2 to 2 mmol/L    POC SATURATED O2 98 95 - 100 %    POC TCO2 28 (H) 23 - 27 mmol/L    Sample ARTERIAL     Site LR     Allens Test Pass     DelSys Nasal Can     Mode SPONT     Flow 2     Sp02 94        Radiographs:  XR CHEST PA AND LATERAL    CLINICAL HISTORY:  shortness of breath;    TECHNIQUE:  PA and lateral views of the chest were performed.    COMPARISON:  None    FINDINGS:  Mild cardiomegaly.  The lungs are clear.  No pleural effusion.      Impression       See above      Electronically signed by: Bobby Duvall MD  Date: 02/13/2019     XR KNEE 1 OR 2 VIEW BILATERAL    CLINICAL HISTORY:  fall from standing;    COMPARISON:  None    FINDINGS:  DJD with significant narrowing of the joint spaces identified bilaterally.  No fracture or dislocation.  No bone destruction identified      Impression       See above      Electronically signed by: Bobby Duvall MD  Date: 02/13/2019     MRI BRAIN WITHOUT CONTRAST    CLINICAL HISTORY:  frequent falls, hx stroke.    TECHNIQUE:  Multiplanar multisequence MR imaging of the brain was performed without contrast.  Examination limited by patient motion artifact.    COMPARISON:  MRI 01/28/2013; 12/21/2012    FINDINGS:  Overall examination is mild to moderately degraded by motion artifact.    Intracranial compartment: Ventricles are stable in size without evidence of hydrocephalus. No extra-axial blood or fluid collections. There is generalized cerebral volume loss.  Remote lacunar type infarct in the right basal ganglia.  Remote hemorrhage within the ruba and additional punctate remote microhemorrhages within the right thalamus and bilateral basal ganglia.  Moderate burden of patchy and confluent T2 hyperintense foci scattered in the supratentorial periventricular and subcortical white matter as well as in the central ruba most likely relate to sequelae of chronic microvascular ischemic disease.  no mass  lesion, acute hemorrhage, edema or acute infarct.    Normal vascular flow voids are preserved.    Skull/extracranial contents (limited evaluation): Bone marrow signal intensity is normal.  Near complete occlusion of the right maxillary antrum with patchy mucosal thickening elsewhere within the paranasal sinuses.      Impression       Mildly degraded examination related to patient motion artifact.  No acute intracranial abnormality.    Remote pontine hemorrhage and remote microhemorrhages within the right thalamus and bilateral basal ganglia.    Sinus disease.    Findings of chronic microvascular ischemic disease.    Electronically signed by resident: Baljit Helton  Date: 2019  Time: 14:04    Electronically signed by: Claus King MD  Date: 2019     ECd ECHO CFD 18:    1 - Concentric hypertrophy.     2 - Low normal to mildly depressed left ventricular systolic function (EF 50-55%).     3 - Left ventricular diastolic dysfunction.     4 - Normal right ventricular systolic function .     5 - Technically difficult study.    SIGNED BY: Min Reis MD On: 2018 18:27    Assessment and plan:      Imbalance with frequent falls  -Check Vitamin D, B12, Folate  -MRI brain reveals no central cause  -Suspect multifactorial from:   -vision issues, medications, age, weight, neuropathy, sciatica and severe DJD knees/hips  -PT and OT consults  -Avoid benzo's and other psychotropic meds  -MRI of L spine as per below    Lumbar spinal stenosis with right sided sciatica  -Check MRI pelvis and lumbo sacral area  -orphenadrine 12 hr tablet 100 mg, 100 mg, Oral, BID PRN spasm of muscles    Right knee pain and effusion  -Ortho consult  -Local cooling pack  -Brace as per PT or ortho    ESRD on hemodialysis  -Nephrology consult  -Plan for HD ASAP  -calcium acetate capsule 667 mg, 667 mg, Oral, TID WM  -ergocalciferol capsule 50,000 Units, 50,000 Units, Oral, Q7 Days    Hyperkalemia  -HD orders written  already  -Telemetry    Diabetes mellitus 2, uncontrolled, neuropathy and nephropathy  -gabapentin capsule 400 mg, 400 mg, Oral, QHS  -insulin detemir U-100 pen 45 Units, 45 Units, Subcutaneous, BID  -Asp 9 units SQ AC with meals TID + SSI protocol    Malignant renovascular hypertension, uncontrolled  -amLODIPine tablet 10 mg, 10 mg, Oral, QAM  -carvedilol tablet 25 mg, 25 mg, Oral, BID  -hydrALAZINE tablet 25 mg, 25 mg, Oral, BID    Anemia in ESRD  -Defer need for Procrit to Nephrology  -Check B12, Fe studies, Folate    Hyperlipidemia  -atorvastatin tablet 40 mg, 40 mg, Oral, Daily  -omega-3 fatty acids-fish oil 340-1,000 mg capsule 1 capsule, 1 capsule, Oral, BID    Depression  -citalopram tablet 20 mg, 20 mg, Oral, Nightly    Personal history of breast cancer  -letrozole tablet 2.5 mg, 2.5 mg, Oral, Nightly     Medications requiring close drug monitoring due to risk of adverse effects:  Oxycodone - opioid in ESRD on HD    DVT prophylaxis:  Anticoagulants   Medication Route Frequency

## 2019-02-13 NOTE — ED NOTES
Pt transferred to room 26; placed on continuous cardiac and pulse ox monitoring with 2 liters of oxygen via nasal cannula. Blood pressure to cycle every 30 minutes.  Pt is hypertensive; VS otherwise stable; NSR noted.  Bed locked in lowest position; side rails up and locked x 2; call light, bedside table, and personal belongings within reach.  Pt updated on plan for repeat ABG;instructed to alert nurse for assistance before attempting to get out of bed; verbalizes understanding.  Pt denies needs or complaints at this time. Family member at bedside; will continue to monitor pt.

## 2019-02-13 NOTE — ED NOTES
Offered to move pt to recliner as she will be admitted and because she falls asleep consistently for last hour or so.  Family and pt prefer to remain in wheelchair as they feel she is more safe.

## 2019-02-14 PROBLEM — M62.89 MUSCULAR IMBALANCE: Status: ACTIVE | Noted: 2019-02-14
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
## 2019-02-14 PROBLEM — R29.6 FREQUENT FALLS: Status: ACTIVE | Noted: 2019-02-14

## 2019-02-14 PROBLEM — N18.6 ESRD ON HEMODIALYSIS: Status: ACTIVE | Noted: 2019-02-14

## 2019-02-14 PROBLEM — Z99.2 ESRD ON HEMODIALYSIS: Status: ACTIVE | Noted: 2019-02-14

## 2019-02-14 PROBLEM — I15.0 SECONDARY RENOVASCULAR HYPERTENSION, MALIGNANT: Status: ACTIVE | Noted: 2019-02-14

## 2019-02-14 PROBLEM — N18.6 ANEMIA IN ESRD (END-STAGE RENAL DISEASE): Status: ACTIVE | Noted: 2019-02-14

## 2019-02-14 PROBLEM — F32.A DEPRESSION: Status: ACTIVE | Noted: 2019-02-14

## 2019-02-14 PROBLEM — D63.1 ANEMIA IN ESRD (END-STAGE RENAL DISEASE): Status: ACTIVE | Noted: 2019-02-14

## 2019-02-14 PROBLEM — E87.5 HYPERKALEMIA: Status: ACTIVE | Noted: 2019-02-14

## 2019-02-14 PROBLEM — R25.1 OCCASIONAL TREMORS: Status: ACTIVE | Noted: 2019-02-14

## 2019-02-14 LAB
ANION GAP SERPL CALC-SCNC: 14 MMOL/L
BASOPHILS # BLD AUTO: 0.03 K/UL
BASOPHILS NFR BLD: 0.6 %
BUN SERPL-MCNC: 36 MG/DL
CALCIUM SERPL-MCNC: 8.8 MG/DL
CHLORIDE SERPL-SCNC: 99 MMOL/L
CO2 SERPL-SCNC: 25 MMOL/L
CREAT SERPL-MCNC: 6.8 MG/DL
DIFFERENTIAL METHOD: ABNORMAL
EOSINOPHIL # BLD AUTO: 0.1 K/UL
EOSINOPHIL NFR BLD: 2.2 %
ERYTHROCYTE [DISTWIDTH] IN BLOOD BY AUTOMATED COUNT: 17 %
EST. GFR  (AFRICAN AMERICAN): 6.8 ML/MIN/1.73 M^2
EST. GFR  (NON AFRICAN AMERICAN): 5.9 ML/MIN/1.73 M^2
ESTIMATED AVG GLUCOSE: 154 MG/DL
GLUCOSE SERPL-MCNC: 115 MG/DL
HAV IGM SERPL QL IA: NEGATIVE
HBA1C MFR BLD HPLC: 7 %
HBV CORE IGM SERPL QL IA: NEGATIVE
HBV SURFACE AG SERPL QL IA: NEGATIVE
HCT VFR BLD AUTO: 29 %
HCV AB SERPL QL IA: NEGATIVE
HGB BLD-MCNC: 9.4 G/DL
IMM GRANULOCYTES # BLD AUTO: 0.01 K/UL
IMM GRANULOCYTES NFR BLD AUTO: 0.2 %
LYMPHOCYTES # BLD AUTO: 1.2 K/UL
LYMPHOCYTES NFR BLD: 22.7 %
MAGNESIUM SERPL-MCNC: 2.1 MG/DL
MCH RBC QN AUTO: 29.5 PG
MCHC RBC AUTO-ENTMCNC: 32.4 G/DL
MCV RBC AUTO: 91 FL
MONOCYTES # BLD AUTO: 0.4 K/UL
MONOCYTES NFR BLD: 8.4 %
NEUTROPHILS # BLD AUTO: 3.4 K/UL
NEUTROPHILS NFR BLD: 65.9 %
NRBC BLD-RTO: 0 /100 WBC
PHOSPHATE SERPL-MCNC: 4.1 MG/DL
PLATELET # BLD AUTO: 213 K/UL
PMV BLD AUTO: 10.3 FL
POCT GLUCOSE: 108 MG/DL (ref 70–110)
POCT GLUCOSE: 121 MG/DL (ref 70–110)
POCT GLUCOSE: 138 MG/DL (ref 70–110)
POCT GLUCOSE: 94 MG/DL (ref 70–110)
POTASSIUM SERPL-SCNC: 3.9 MMOL/L
RBC # BLD AUTO: 3.19 M/UL
SODIUM SERPL-SCNC: 138 MMOL/L
WBC # BLD AUTO: 5.1 K/UL

## 2019-02-14 PROCEDURE — 97162 PT EVAL MOD COMPLEX 30 MIN: CPT

## 2019-02-14 PROCEDURE — 97530 THERAPEUTIC ACTIVITIES: CPT

## 2019-02-14 PROCEDURE — S5571 INSULIN DISPOS PEN 3 ML: HCPCS | Performed by: INTERNAL MEDICINE

## 2019-02-14 PROCEDURE — 80048 BASIC METABOLIC PNL TOTAL CA: CPT

## 2019-02-14 PROCEDURE — 80074 ACUTE HEPATITIS PANEL: CPT

## 2019-02-14 PROCEDURE — 84100 ASSAY OF PHOSPHORUS: CPT

## 2019-02-14 PROCEDURE — 99214 OFFICE O/P EST MOD 30 MIN: CPT | Mod: ,,, | Performed by: NURSE PRACTITIONER

## 2019-02-14 PROCEDURE — 25000003 PHARM REV CODE 250: Performed by: PHYSICIAN ASSISTANT

## 2019-02-14 PROCEDURE — 99225 PR SUBSEQUENT OBSERVATION CARE,LEVEL II: CPT | Mod: ,,, | Performed by: PHYSICIAN ASSISTANT

## 2019-02-14 PROCEDURE — 83735 ASSAY OF MAGNESIUM: CPT

## 2019-02-14 PROCEDURE — G0378 HOSPITAL OBSERVATION PER HR: HCPCS

## 2019-02-14 PROCEDURE — 99214 PR OFFICE/OUTPT VISIT, EST, LEVL IV, 30-39 MIN: ICD-10-PCS | Mod: ,,, | Performed by: NURSE PRACTITIONER

## 2019-02-14 PROCEDURE — 85025 COMPLETE CBC W/AUTO DIFF WBC: CPT

## 2019-02-14 PROCEDURE — 63600175 PHARM REV CODE 636 W HCPCS: Performed by: INTERNAL MEDICINE

## 2019-02-14 PROCEDURE — 99225 PR SUBSEQUENT OBSERVATION CARE,LEVEL II: ICD-10-PCS | Mod: ,,, | Performed by: PHYSICIAN ASSISTANT

## 2019-02-14 PROCEDURE — 83036 HEMOGLOBIN GLYCOSYLATED A1C: CPT

## 2019-02-14 PROCEDURE — G0257 UNSCHED DIALYSIS ESRD PT HOS: HCPCS

## 2019-02-14 PROCEDURE — 97164 PT RE-EVAL EST PLAN CARE: CPT

## 2019-02-14 PROCEDURE — 80100014 HC HEMODIALYSIS 1:1

## 2019-02-14 PROCEDURE — 25000003 PHARM REV CODE 250: Performed by: INTERNAL MEDICINE

## 2019-02-14 RX ORDER — HYDROCODONE BITARTRATE AND ACETAMINOPHEN 5; 325 MG/1; MG/1
1 TABLET ORAL 2 TIMES DAILY
Status: DISCONTINUED | OUTPATIENT
Start: 2019-02-14 | End: 2019-02-14

## 2019-02-14 RX ORDER — SODIUM CHLORIDE 9 MG/ML
INJECTION, SOLUTION INTRAVENOUS
Status: DISCONTINUED | OUTPATIENT
Start: 2019-02-15 | End: 2019-02-19

## 2019-02-14 RX ORDER — SODIUM CHLORIDE 9 MG/ML
INJECTION, SOLUTION INTRAVENOUS ONCE
Status: COMPLETED | OUTPATIENT
Start: 2019-02-15 | End: 2019-02-15

## 2019-02-14 RX ORDER — HYDROCODONE BITARTRATE AND ACETAMINOPHEN 5; 325 MG/1; MG/1
1 TABLET ORAL 2 TIMES DAILY
Status: DISPENSED | OUTPATIENT
Start: 2019-02-14 | End: 2019-02-15

## 2019-02-14 RX ADMIN — Medication 1 CAPSULE: at 08:02

## 2019-02-14 RX ADMIN — ACETAMINOPHEN AND CODEINE PHOSPHATE 1 TABLET: 300; 30 TABLET ORAL at 01:02

## 2019-02-14 RX ADMIN — AMLODIPINE BESYLATE 10 MG: 10 TABLET ORAL at 06:02

## 2019-02-14 RX ADMIN — HEPARIN SODIUM 5000 UNITS: 5000 INJECTION, SOLUTION INTRAVENOUS; SUBCUTANEOUS at 10:02

## 2019-02-14 RX ADMIN — LETROZOLE 2.5 MG: 2.5 TABLET ORAL at 10:02

## 2019-02-14 RX ADMIN — CALCIUM ACETATE 667 MG: 667 CAPSULE ORAL at 01:02

## 2019-02-14 RX ADMIN — CALCIUM ACETATE 667 MG: 667 CAPSULE ORAL at 08:02

## 2019-02-14 RX ADMIN — LETROZOLE 2.5 MG: 2.5 TABLET ORAL at 01:02

## 2019-02-14 RX ADMIN — GABAPENTIN 400 MG: 100 CAPSULE ORAL at 12:02

## 2019-02-14 RX ADMIN — HEPARIN SODIUM 5000 UNITS: 5000 INJECTION, SOLUTION INTRAVENOUS; SUBCUTANEOUS at 01:02

## 2019-02-14 RX ADMIN — Medication 1 CAPSULE: at 10:02

## 2019-02-14 RX ADMIN — HYDRALAZINE HYDROCHLORIDE 25 MG: 25 TABLET, FILM COATED ORAL at 10:02

## 2019-02-14 RX ADMIN — CALCIUM ACETATE 667 MG: 667 CAPSULE ORAL at 04:02

## 2019-02-14 RX ADMIN — CITALOPRAM HYDROBROMIDE 20 MG: 20 TABLET ORAL at 10:02

## 2019-02-14 RX ADMIN — INSULIN DETEMIR 45 UNITS: 100 INJECTION, SOLUTION SUBCUTANEOUS at 12:02

## 2019-02-14 RX ADMIN — Medication 1 CAPSULE: at 12:02

## 2019-02-14 RX ADMIN — HEPARIN SODIUM 5000 UNITS: 5000 INJECTION, SOLUTION INTRAVENOUS; SUBCUTANEOUS at 08:02

## 2019-02-14 RX ADMIN — ERGOCALCIFEROL 50000 UNITS: 1.25 CAPSULE ORAL at 08:02

## 2019-02-14 RX ADMIN — CITALOPRAM HYDROBROMIDE 20 MG: 20 TABLET ORAL at 12:02

## 2019-02-14 RX ADMIN — CARVEDILOL 25 MG: 25 TABLET, FILM COATED ORAL at 08:02

## 2019-02-14 RX ADMIN — INSULIN DETEMIR 45 UNITS: 100 INJECTION, SOLUTION SUBCUTANEOUS at 08:02

## 2019-02-14 RX ADMIN — HYDRALAZINE HYDROCHLORIDE 25 MG: 25 TABLET, FILM COATED ORAL at 12:02

## 2019-02-14 RX ADMIN — GABAPENTIN 400 MG: 100 CAPSULE ORAL at 10:02

## 2019-02-14 RX ADMIN — HEPARIN SODIUM 5000 UNITS: 5000 INJECTION, SOLUTION INTRAVENOUS; SUBCUTANEOUS at 12:02

## 2019-02-14 RX ADMIN — STANDARDIZED SENNA CONCENTRATE AND DOCUSATE SODIUM 1 TABLET: 8.6; 5 TABLET, FILM COATED ORAL at 10:02

## 2019-02-14 RX ADMIN — HYDRALAZINE HYDROCHLORIDE 25 MG: 25 TABLET, FILM COATED ORAL at 08:02

## 2019-02-14 RX ADMIN — INSULIN DETEMIR 45 UNITS: 100 INJECTION, SOLUTION SUBCUTANEOUS at 10:02

## 2019-02-14 RX ADMIN — STANDARDIZED SENNA CONCENTRATE AND DOCUSATE SODIUM 1 TABLET: 8.6; 5 TABLET, FILM COATED ORAL at 12:02

## 2019-02-14 RX ADMIN — CARVEDILOL 25 MG: 25 TABLET, FILM COATED ORAL at 12:02

## 2019-02-14 RX ADMIN — ATORVASTATIN CALCIUM 40 MG: 20 TABLET, FILM COATED ORAL at 08:02

## 2019-02-14 RX ADMIN — CARVEDILOL 25 MG: 25 TABLET, FILM COATED ORAL at 10:02

## 2019-02-14 RX ADMIN — STANDARDIZED SENNA CONCENTRATE AND DOCUSATE SODIUM 1 TABLET: 8.6; 5 TABLET, FILM COATED ORAL at 08:02

## 2019-02-14 NOTE — PROGRESS NOTES
Patient could not finish the MRI,, discomfort and getting too hot,, Her nurse was notified and will be transported back to room

## 2019-02-14 NOTE — CONSULTS
[facility]  Orthopedics  Consult Note    Patient Name: Kylie King  MRN: [id number]  Admission Date: 2/13/2019  Hospital Length of Stay: 0 days  Attending Provider: JIM Lilly MD  Primary Care Provider: Virginia Foote MD    Patient information was obtained from patient and ER records.     Inpatient consult to Orthopedics  Consult performed by: Joe Patterson MD  Consult ordered by: JIM Lilly MD        Subjective:     Principal Problem:Lumbar stenosis    Chief Complaint:   Chief Complaint   Patient presents with    Fall     fell going down 1 step going to dialysis, off muscle relaxants 4 days for leg pain and cramping        HPI: Kylie King is a 63 y.o. female with multiple medical comorbidities, who has ESRD on HD M/W/F due to longstanding uncontrolled DM2 and renovascular HTN presented to the ED with bilateral increasing leg weakness over the past two weeks.  Orthopaedics was consulted for right hip and knee pain.  Patient does state that she has a long history of back pain and lumbar stenosis.  She describes her pain as radiating down the posterior aspect of her hip, thigh and down the back of her leg.  She states that she does have some knee pain occasionally but she has osteoarthritis of the right knee and the sciatica pain is her main source of pain.  Her main complaint currently is the weakness.  She is on dialysis and does not make urine but she has not had any new bowel incontinence.      Past Medical History:   Diagnosis Date    Anxiety     Arthritis     DDD, Lumbar    Breast cancer 1/2013    left breast- invasive mammary carcinoma, ER/ND positive, Her2 negative    Carotid artery stenosis 8/13/2018 6/22/2018: Carotid Duplex: SHANELL: Moderate plaquing - 2.0 m/s - <50%. LICA: Moderate plaquing - 1.6 m/s - <50%.    Cataract     Choledocholithiasis     s/p ERCP and stent placement    Chronic obstructive pulmonary disease 6/8/2018    Chronic venous  insufficiency     Colon cancer 2001    CRI (chronic renal insufficiency)     one kidney    Dialysis AV fistula malfunction     ESRD (end stage renal disease) on dialysis     Former smoker 6/8/2018    GERD (gastroesophageal reflux disease)     History of acute pancreatitis 2009 2/09 s/p sphincterotomy    History of cerebrovascular accident 6/8/2018    History of colon cancer     s/p sigmoid colectomy    HTN (hypertension), benign     Hypercholesterolemia     Hyperlipidemia     Hypoxemia 6/8/2018    Intracerebral hemorrhage     Kidney stone     left    Lymphedema of both lower extremities     Obesity     Pancreatitis     Pancreatitis 2008    Physical deconditioning     Pulmonary edema     Sacroiliac joint pain     Spinal stenosis     Spinal stenosis, lumbar     Stroke 12/2012    Tobacco abuse     Type 2 diabetes mellitus with neurological manifestations, controlled     Neuropathy       Past Surgical History:   Procedure Laterality Date    AMPUTATION-TOE, partial Left 11/25/2016    Performed by Jose Delacruz Jr., DPM at [facility] OR 2ND FLR    BIOPSY, LYMPH NODE, SENTINEL Left 2/25/2013    Performed by Dirk Oneil MD at [facility] OR 2ND FLR    BLOCK-NERVE-MEDIAL BRANCH-LUMBAR Bilateral 3/1/2016    Performed by [facility]acie Negrete MD at [facility]GT    BLOCK-NERVE-MEDIAL BRANCH-LUMBAR Bilateral 2/23/2016    Performed by [facility]ie Neg[facility]te MD at [facility]    BREAST BIOPSY  1/2012    left breast invasive mammary carcinoma (lateral bx) and intraductal papilloma (medial bx)    BREAST BIOPSY  1999    rt breast FA    CHOLECYSTECTOMY  2001    COLON SURGERY      CREATION -FISTULA-AV Right 5/28/2018    Performed by RICK Pollard III, MD at [facility] OR 2ND FLR    EXTRACTION, CATARACT, WITH IOL INSERTION Left 1/31/2019    Performed by Immanuel Moncada MD at [facility] OR    EXTRACTION, CATARACT, WITH IOL INSERTION Right 1/24/2019    Performed by Immanuel Moncada MD at [facility] OR    HERNIA REPAIR       INJECTION, FOR SENTINEL LYMPH NODE IDENTIFICATION Left 2/25/2013    Performed by Dirk Oneil MD at [facility] OR 2ND FLR    INJECTION-JOINT Bilateral 1/20/2016    Performed by [facility]te MD at [facility] MGT    INSERTION, TISSUE EXPANDER, BREAST Left 2/25/2013    Performed by Terry Moreno MD at [facility] OR 2ND FLR    INSERTION-CATHETER-DIALYSIS Right 4/23/2018    Performed by Jenae Salguero DO at [facility] OR    INSERTION-CATHETER-BRENNEN CATH - C-ARM N/A 11/11/2016    Performed by Bobby Blount Jr., MD at [facility] OR    left nephrectomy      atrophic kidney and hydronephrosis    left oopherectomy  2001    MASTECTOMY  2013    left    MASTECTOMY Left 2/25/2013    Performed by Dirk Oneil MD at [facility] OR 2ND FLR    MASTOPEXY Right 2/25/2013    Performed by Terry Moreno MD at [facility] OR 2ND FLR    NEPHRECTOMY  2011    lap    RADIOFREQUENCY THERMOCOAGULATION (RFTC)-NERVE-MEDIAN BRANCH-LUMBAR Left 3/30/2016    Performed by Stacie Negrete MD at [facility] PAIN MGT    RADIOFREQUENCY THERMOCOAGULATION (RFTC)-NERVE-MEDIAN BRANCH-LUMBAR Right 3/16/2016    Performed by [facility]acie Neg[facility]te MD at [facility]    RECONSTRUCTION, BREAST, USING LATISSIMUS DORSI MYOCUTANEOUS FLAP Left 4/11/2013    Performed by Terry Moreno MD at [facility] OR 2ND FLR    RECONSTRUCTION-NIPPLE Left 1/9/2014    Performed by Terry Moreno MD at [facility] OR 2ND FLR    REPAIR, HERNIA, VENTRAL, LAPAROSCOPIC N/A 3/28/2014    Performed by Min Polanco MD at [facility] OR 2ND FLR    REVISION Bilateral 1/9/2014    Performed by Terry Moreno MD at [facility] OR [facility] FLR    REVISION, AV FISTULA Right 8/15/2018    Performed by RICK Pollard III, MD at [facility] OR 2ND FLR    SIGMOIDECTOMY  2001    TOTAL REDUCTION MAMMOPLASTY Right 2013    TRANSPOSITION, VEIN Right 8/15/2018    Performed by RICK Pollard III, MD at [facility] OR 2ND FLR       Review of patient's allergies indicates:   Allergen Reactions     Cyclobenzaprine Hallucinations    Erythromycin      Stomach upset    Keflex [cephalexin]      Yeast infection       Current Facility-Administered Medications   Medication    0.9%  NaCl infusion    acetaminophen tablet 500 mg    acetaminophen-codeine 300-30mg per tablet 1 tablet    albuterol-ipratropium 2.5 mg-0.5 mg/3 mL nebulizer solution 3 mL    amLODIPine tablet 10 mg    atorvastatin tablet 40 mg    calcium acetate capsule 667 mg    carvedilol tablet 25 mg    citalopram tablet 20 mg    dextrose 50% injection 12.5 g    dextrose 50% injection 25 g    ergocalciferol capsule 50,000 Units    fluticasone 50 mcg/actuation nasal spray 100 mcg    gabapentin capsule 400 mg    glucagon (human recombinant) injection 1 mg    glucose chewable tablet 16 g    glucose chewable tablet 24 g    heparin (porcine) injection 5,000 Units    hydrALAZINE tablet 25 mg    insulin aspart U-100 pen 0-5 Units    insulin aspart U-100 pen 9 Units    insulin detemir U-100 pen 45 Units    letrozole tablet 2.5 mg    omega-3 fatty acids-fish oil 340-1,000 mg capsule 1 capsule    ondansetron disintegrating tablet 8 mg    orphenadrine 12 hr tablet 100 mg    promethazine tablet 12.5 mg    ramelteon tablet 8 mg    senna-docusate 8.6-50 mg per tablet 1 tablet    sodium chloride 0.9% flush 5 mL     Facility-Administered Medications Ordered in Other Encounters   Medication    mupirocin 2 % ointment     Family History     Problem Relation (Age of Onset)    Arthritis Mother    Breast cancer Maternal Grandmother    Cancer Maternal Grandmother, Maternal Aunt    Celiac disease Sister    Diabetes Father    Heart disease Mother, Father, Maternal Grandmother        Tobacco Use    Smoking status: Current Some Day Smoker     Packs/day: 0.50     Years: 28.00     Pack years: 14.00     Types: Cigarettes     Start date: 1990     Last attempt to quit: 2018     Years since quittin.1    Smokeless tobacco: Never Used     "Tobacco comment: anxious   Substance and Sexual Activity    Alcohol use: No    Drug use: No    Sexual activity: No     Partners: Male     ROS  Objective:     Vital Signs (Most Recent):  Temp: 98.7 °F (37.1 °C) (02/14/19 0801)  Pulse: 78 (02/14/19 0936)  Resp: 20 (02/14/19 0706)  BP: 128/65 (02/14/19 0706)  SpO2: 98 % (02/14/19 0936) Vital Signs (24h Range):  Temp:  [97.4 °F (36.3 °C)-98.7 °F (37.1 °C)] 98.7 °F (37.1 °C)  Pulse:  [62-85] 78  Resp:  [16-24] 20  SpO2:  [92 %-98 %] 98 %  BP: (114-215)/() 128/65     Weight: 122 kg (268 lb 15.4 oz)  Height: 5' 4" (162.6 cm)  Body mass index is 46.17 kg/m².      Intake/Output Summary (Last 24 hours) at 2/14/2019 1130  Last data filed at 2/14/2019 0834  Gross per 24 hour   Intake 550 ml   Output 3300 ml   Net -2750 ml       Ortho/SPM Exam     Temp:  [97.4 °F (36.3 °C)-98.7 °F (37.1 °C)] 98.7 °F (37.1 °C)  Pulse:  [62-85] 78  Resp:  [16-24] 20  SpO2:  [92 %-98 %] 98 %  BP: (114-215)/() 128/65    PE:    AA&O x 4.  NAD  HEENT:  NCAT, sclera nonicteric  Lungs:  Respirations are equal and unlabored.  CV:  2+ bilateral upper and lower extremity pulses.  Skin:  Intact throughout.    MSK:    BLE:  - there is chronic edema,  No ttp at the hip knee or ankle  - AROM and PROM of the hip and knee is intact and without pain  -log roll negative   -straight leg test is positive bilaterally  - TA/EHL/Gastroc/FHL assessed in isolation without deficit  - SILT throughout  - DP and PT palpated  2+  - Capillary Refill <3s      Motor            Right  Left  Deltoid(C5)        5/5    5/5  Biceps(C5)         5/5    5/5  Extensor Carpi Radialis Longus(C6)    5/5    5/5   Triceps(C7)       5/5    5/5     Flexor Carpi Radialis(C7)     5/5    5/5  Flexor Digitorum Superficialis(C8)    5/5    5/5  Interossei(T1)       5/5    5/5     Hip Flexion (L3)       4/5    4/5  Knee Extension (L4)      4/5    4/5  Tib Ant (L5)       4/5    4/5   EHL (L5)       4/5    4/5  Gastrocs (S1)       4/5    " 4/5  Peroneals (S1)      4/5    4/5   FHL (S2)       4/5    4/5       Sensation:             C5    C6     C7    C8    T1   R        I        I       I       I         I      L         I        I       I       I         I                 L1     L2     L3    L4    L5   S1    S2  R        I        I       I       D        D      D        D  L         I        I       I       D        D     D        D    I =Intact,  O=Out, D=Decreased, P=Paresthesias    Reflexes       Right  Left  Biceps (C5)        2+     2+  Brachioradialis (C6)       2+           2+  Triceps (C7)        2+     2+  Inverted Radial   -ve    -ve  Hoffmans's       -ve    -ve    Patellar        2+     2+  Babinski       -ve    -ve  Clonus     -ve    -ve    Significant Labs: All pertinent labs within the past 24 hours have been reviewed.    Significant Imaging: I have reviewed and interpreted all pertinent imaging results/findings.   MRI shows significant lumbar stenosis most prominent at L4    Assessment/Plan:     * Lumbar stenosis    Kylie King is a 63 y.o. female with osteoarthritis of both knees/ hips and sciatica with significant lumbar stenosis most prominent at L4.    -Patient has a long history of lumbar stenosis and osteoarthritis.  Believe that the patients current increase in weakness is due to her lumbar stenosis and her pain is very typical of sciatica and she has minimal complaints of knee joint or groin pain.  No acute fractures.  Recommend PT for osteoarthritis.  We will obtain x-rays of the lumbar spine to look for any instability.  No emergent surgical intervention required but will discuss with staff for possible future elective surgery.                  Thank you for your consult. I will follow-up with patient. Please contact us if you have any additional questions.    Joe Patterson MD  Orthopedics  [facility]

## 2019-02-14 NOTE — NURSING
Received pt from ER on stretcher. Pt is awake and alert and in no distress on 2 L NC. She states the plan is to dialyze her tonight.  is at bedside. She does c/o chronic back pain.  Will review pain meds.

## 2019-02-14 NOTE — SUBJECTIVE & OBJECTIVE
Past Medical History:   Diagnosis Date    Anxiety     Arthritis     DDD, Lumbar    Breast cancer 1/2013    left breast- invasive mammary carcinoma, ER/IN positive, Her2 negative    Carotid artery stenosis 8/13/2018 6/22/2018: Carotid Duplex: SHANELL: Moderate plaquing - 2.0 m/s - <50%. LICA: Moderate plaquing - 1.6 m/s - <50%.    Cataract     Choledocholithiasis     s/p ERCP and stent placement    Chronic obstructive pulmonary disease 6/8/2018    Chronic venous insufficiency     Colon cancer 2001    CRI (chronic renal insufficiency)     one kidney    Dialysis AV fistula malfunction     ESRD (end stage renal disease) on dialysis     Former smoker 6/8/2018    GERD (gastroesophageal reflux disease)     History of acute pancreatitis 2009 2/09 s/p sphincterotomy    History of cerebrovascular accident 6/8/2018    History of colon cancer     s/p sigmoid colectomy    HTN (hypertension), benign     Hypercholesterolemia     Hyperlipidemia     Hypoxemia 6/8/2018    Intracerebral hemorrhage     Kidney stone     left    Lymphedema of both lower extremities     Obesity     Pancreatitis     Pancreatitis 2008    Physical deconditioning     Pulmonary edema     Sacroiliac joint pain     Spinal stenosis     Spinal stenosis, lumbar     Stroke 12/2012    Tobacco abuse     Type 2 diabetes mellitus with neurological manifestations, controlled     Neuropathy       Past Surgical History:   Procedure Laterality Date    AMPUTATION-TOE, partial Left 11/25/2016    Performed by Jose [facility]ruz [facility] D[facility] OR 2ND FLR    BIOPSY, LYMPH NODE, SENTINEL Left 2/25/2013    Performed by Dirk [facility]il MD at [facility] OR 2ND FLR    BLOCK-NERVE-MEDIAL BRANCH-LUMBAR Bilateral 3/1/2016    Per[facility] by [facility]te [facility] at [facility]    BLOCK-NERVE-MEDIAL BRANCH-LUMBAR Bilateral 2/23/2016    Performed by [facility]ie [facility] MD at [facility]    BREAST BIOPSY  1/2012    left breast invasive mammary  carcinoma (lateral bx) and intraductal papilloma (medial bx)    BREAST BIOPSY  1999    rt breast FA    CHOLECYSTECTOMY  2001    COLON SURGERY      CREATION -FISTULA-AV Right 5/28/2018    Performed by RICK Pollard III, MD at [facility] OR 2ND FLR    EXTRACTION, CATARACT, WITH IOL INSERTION Left 1/31/2019    Performed by Immanuel [facility]ada MD at [facility] OR    EXTRACTION, CATARACT, WITH IOL INSERTION Right 1/24/2019    Performed by Immanuel Moncada MD at [facility] OR    HERNIA REPAIR      INJECTION, FOR SENTINEL LYMPH NODE IDENTIFICATION Left 2/25/2013    Performed by Dirk Oneil MD at [facility] OR 2ND FLR    INJECTION-JOINT Bilateral 1/20/2016    Performed by Stacie Negrete MD at [facility] PAIN MGT    INSERTION, TISSUE EXPANDER, BREAST Left 2/25/2013    Performed by Terry Moreno MD at [facility] OR 2ND FLR    INSERTION-CATHETER-DIALYSIS Right 4/23/2018    Performed by Jenae Salguero DO at [facility]    INSERTION-CATHETER-BRENNEN CATH - C-ARM N/A 11/11/2016    Performed by Bobby Blount Jr., MD at [facility] OR    left nephrectomy      atrophic kidney and hydronephrosis    left oopherectomy  2001    MASTECTOMY  2013    left    MASTECTOMY Left 2/25/2013    Performed by Dirk Oneil MD at [facility] OR 2ND FLR    MASTOPEXY Right 2/25/2013    Performed by Terry Moreno MD at [facility] OR 2ND FLR    NEPHRECTOMY  2011    lap    RADIOFREQUENCY THERMOCOAGULATION (RFTC)-NERVE-MEDIAN BRANCH-LUMBAR Left 3/30/2016    Performed by Stacie Negrete MD at [facility] PAIN MGT    RADIOFREQUENCY THERMOCOAGULATION (RFTC)-NERVE-MEDIAN BRANCH-LUMBAR Right 3/16/2016    Performed by Stacie Negrete MD at [facility] PAIN MGT    RECONSTRUCTION, BREAST, USING LATISSIMUS DORSI MYOCUTANEOUS FLAP Left 4/11/2013    Performed by Terry Moreno MD at [facility] OR 2ND FLR    RECONSTRUCTION-NIPPLE Left 1/9/2014    Performed by Terry Moreno MD at [facility] OR 2ND FLR    REPAIR, HERNIA, VENTRAL, LAPAROSCOPIC N/A 3/28/2014    Performed by  Min Polanco MD at [facility] OR 2ND FLR    REVISION Bilateral 1/9/2014    Performed by Terry Moreno MD at [facility] OR 2ND FLR    REVISION, AV FISTULA Right 8/15/2018    Performed by RICK Pollard III, MD at [facility] OR 2ND FLR    SIGMOIDECTOMY  2001    TOTAL REDUCTION MAMMOPLASTY Right 2013    TRANSPOSITION, VEIN Right 8/15/2018    Performed by RICK Pollard III, MD at [facility] OR 2ND FLR       Review of patient's allergies indicates:   Allergen Reactions    Cyclobenzaprine Hallucinations    Erythromycin      Stomach upset    Keflex [cephalexin]      Yeast infection       Current Facility-Administered Medications   Medication    0.9%  NaCl infusion    acetaminophen tablet 500 mg    acetaminophen-codeine 300-30mg per tablet 1 tablet    albuterol-ipratropium 2.5 mg-0.5 mg/3 mL nebulizer solution 3 mL    amLODIPine tablet 10 mg    atorvastatin tablet 40 mg    calcium acetate capsule 667 mg    carvedilol tablet 25 mg    citalopram tablet 20 mg    dextrose 50% injection 12.5 g    dextrose 50% injection 25 g    ergocalciferol capsule 50,000 Units    fluticasone 50 mcg/actuation nasal spray 100 mcg    gabapentin capsule 400 mg    glucagon (human recombinant) injection 1 mg    glucose chewable tablet 16 g    glucose chewable tablet 24 g    heparin (porcine) injection 5,000 Units    hydrALAZINE tablet 25 mg    insulin aspart U-100 pen 0-5 Units    insulin aspart U-100 pen 9 Units    insulin detemir U-100 pen 45 Units    letrozole tablet 2.5 mg    omega-3 fatty acids-fish oil 340-1,000 mg capsule 1 capsule    ondansetron disintegrating tablet 8 mg    orphenadrine 12 hr tablet 100 mg    promethazine tablet 12.5 mg    ramelteon tablet 8 mg    senna-docusate 8.6-50 mg per tablet 1 tablet    sodium chloride 0.9% flush 5 mL     Facility-Administered Medications Ordered in Other Encounters   Medication    mupirocin 2 % ointment     Family History     Problem Relation (Age of Onset)     "Arthritis Mother    Breast cancer Maternal Grandmother    Cancer Maternal Grandmother, Maternal Aunt    Celiac disease Sister    Diabetes Father    Heart disease Mother, Father, Maternal Grandmother        Tobacco Use    Smoking status: Current Some Day Smoker     Packs/day: 0.50     Years: 28.00     Pack years: 14.00     Types: Cigarettes     Start date: 1990     Last attempt to quit: 2018     Years since quittin.1    Smokeless tobacco: Never Used    Tobacco comment: anxious   Substance and Sexual Activity    Alcohol use: No    Drug use: No    Sexual activity: No     Partners: Male     ROS  Objective:     Vital Signs (Most Recent):  Temp: 98.7 °F (37.1 °C) (19 0801)  Pulse: 78 (19 0936)  Resp: 20 (19 07)  BP: 128/65 (19 07)  SpO2: 98 % (19 0936) Vital Signs (24h Range):  Temp:  [97.4 °F (36.3 °C)-98.7 °F (37.1 °C)] 98.7 °F (37.1 °C)  Pulse:  [62-85] 78  Resp:  [16-24] 20  SpO2:  [92 %-98 %] 98 %  BP: (114-215)/() 128/65     Weight: 122 kg (268 lb 15.4 oz)  Height: 5' 4" (162.6 cm)  Body mass index is 46.17 kg/m².      Intake/Output Summary (Last 24 hours) at 2019 1130  Last data filed at 2019 0834  Gross per 24 hour   Intake 550 ml   Output 3300 ml   Net -2750 ml       Ortho/SPM Exam     Temp:  [97.4 °F (36.3 °C)-98.7 °F (37.1 °C)] 98.7 °F (37.1 °C)  Pulse:  [62-85] 78  Resp:  [16-24] 20  SpO2:  [92 %-98 %] 98 %  BP: (114-215)/() 128/65    PE:    AA&O x 4.  NAD  HEENT:  NCAT, sclera nonicteric  Lungs:  Respirations are equal and unlabored.  CV:  2+ bilateral upper and lower extremity pulses.  Skin:  Intact throughout.    MSK:    BLE:  - there is chronic edema,  No ttp at the hip knee or ankle  - AROM and PROM of the hip and knee is intact and without pain  -log roll negative   -straight leg test is positive bilaterally  - TA/EHL/Gastroc/FHL assessed in isolation without deficit  - SILT throughout  - DP and PT palpated  2+  - Capillary Refill " <3s      Motor            Right  Left  Deltoid(C5)        5/5    5/5  Biceps(C5)         5/5    5/5  Extensor Carpi Radialis Longus(C6)    5/5    5/5   Triceps(C7)       5/5    5/5     Flexor Carpi Radialis(C7)     5/5    5/5  Flexor Digitorum Superficialis(C8)    5/5    5/5  Interossei(T1)       5/5    5/5     Hip Flexion (L3)       4/5    4/5  Knee Extension (L4)      4/5    4/5  Tib Ant (L5)       4/5    4/5   EHL (L5)       4/5    4/5  Gastrocs (S1)       4/5    4/5  Peroneals (S1)      4/5    4/5   FHL (S2)       4/5    4/5       Sensation:             C5    C6     C7    C8    T1   R        I        I       I       I         I      L         I        I       I       I         I                 L1     L2     L3    L4    L5   S1    S2  R        I        I       I       D        D      D        D  L         I        I       I       D        D     D        D    I =Intact,  O=Out, D=Decreased, P=Paresthesias    Reflexes       Right  Left  Biceps (C5)        2+     2+  Brachioradialis (C6)       2+           2+  Triceps (C7)        2+     2+  Inverted Radial   -ve    -ve  Hoffmans's       -ve    -ve    Patellar        2+     2+  Babinski       -ve    -ve  Clonus     -ve    -ve    Significant Labs: All pertinent labs within the past 24 hours have been reviewed.    Significant Imaging: I have reviewed and interpreted all pertinent imaging results/findings.   MRI shows significant lumbar stenosis most prominent at L4

## 2019-02-14 NOTE — HPI
64 y/o  female with history of ESRD, HTN, T2DM, poor balance due to obesity, age, and neuropathy, lumbar stenosis, and arthritis presented with recent up-tick in her falls (with approx 8 falls over the past few weeks) that began with R-sided sciatica pain and weakness    History obtained from the patient and the medical chart    Nephrology consulted for ESRD management

## 2019-02-14 NOTE — PROGRESS NOTES
Hemodialysis    bedside HD treatment in room 3082W. Patient is awake, oriented; on oxygen via nasal cannula @2LPM saturating well.    ACCESS: right AV fistula with bruit and thrill, cannulated with gauge 15 needles smoothly.    HD started

## 2019-02-14 NOTE — PLAN OF CARE
Problem: Adult Inpatient Plan of Care  Goal: Plan of Care Review  Outcome: Ongoing (interventions implemented as appropriate)  No signs of distress this shift.  Treated pain with prn medication.  Currently receiving HD.  No falls noted this shift.  Educated pt on chemo precautions this shift.  Will continue to reinforce education.

## 2019-02-14 NOTE — PLAN OF CARE
Problem: Physical Therapy Goal  Goal: Physical Therapy Goal  Goals to be met by: 19     Patient will increase functional independence with mobility by performin. Supine to sit with Contact Guard Assistance  2. Sit to supine with Contact Guard Assistance  3. Sit to stand transfer with Moderate Assistance  4. Bed to chair transfer with Moderate Assistance using Rolling Walker or LRD.   5. Gait  x 10 feet with Maximum Assistance using Rolling Walker or LRD.   6. Ascend/descend 5 stair with bilateral Handrails Maximum Assistance using Rolling Walker or LRD.   7. Stand for 2 minutes with Moderate Assistance using Rolling Walker or LRD.     Outcome: Ongoing (interventions implemented as appropriate)  Patient evaluated today. All goals established are appropriate for patient progression at this time.     Iván Petersen PT, DPT  2019  Pager: [phone]

## 2019-02-14 NOTE — CONSULTS
[facility]  Orthopedics  Consult Note    Patient Name: Kylie King  MRN: [id number]  Admission Date: 2/13/2019  Hospital Length of Stay: 0 days  Attending Provider: JIM Lilly MD  Primary Care Provider: Virginia Foote MD    Patient information was obtained from patient and ER records.     Inpatient consult to Orthopedic Surgery  Consult performed by: Joe Patterson MD  Consult ordered by: JIM Lilly MD        Subjective:     Principal Problem:<principal problem not specified>    Chief Complaint:   Chief Complaint   Patient presents with    Fall     fell going down 1 step going to dialysis, off muscle relaxants 4 days for leg pain and cramping        HPI: Kylie King is a 63 y.o. female with multiple medical comorbidities, who has ESRD on HD M/W/F due to longstanding uncontrolled DM2 and renovascular HTN presented to the ED with bilateral increasing leg weakness over the past two weeks.  Orthopaedics was consulted for right hip and knee pain.  Patient does state that she has a long history of back pain and lumbar stenosis.  She describes her pain as radiating down the posterior aspect of her hip, thigh and down the back of her leg.  She states that she does have some knee pain occasionally but she has osteoarthritis of the right knee and the sciatica pain is her main source of pain.  Her main complaint currently is the weakness.      Past Medical History:   Diagnosis Date    Anxiety     Arthritis     DDD, Lumbar    Breast cancer 1/2013    left breast- invasive mammary carcinoma, ER/IA positive, Her2 negative    Carotid artery stenosis 8/13/2018 6/22/2018: Carotid Duplex: SHANELL: Moderate plaquing - 2.0 m/s - <50%. LICA: Moderate plaquing - 1.6 m/s - <50%.    Cataract     Choledocholithiasis     s/p ERCP and stent placement    Chronic obstructive pulmonary disease 6/8/2018    Chronic venous insufficiency     Colon cancer 2001    CRI (chronic renal  insufficiency)     one kidney    Dialysis AV fistula malfunction     ESRD (end stage renal disease) on dialysis     Former smoker 6/8/2018    GERD (gastroesophageal reflux disease)     History of acute pancreatitis 2009 2/09 s/p sphincterotomy    History of cerebrovascular accident 6/8/2018    History of colon cancer     s/p sigmoid colectomy    HTN (hypertension), benign     Hypercholesterolemia     Hyperlipidemia     Hypoxemia 6/8/2018    Intracerebral hemorrhage     Kidney stone     left    Lymphedema of both lower extremities     Obesity     Pancreatitis     Pancreatitis 2008    Physical deconditioning     Pulmonary edema     Sacroiliac joint pain     Spinal stenosis     Spinal stenosis, lumbar     Stroke 12/2012    Tobacco abuse     Type 2 diabetes mellitus with neurological manifestations, controlled     Neuropathy       Past Surgical History:   Procedure Laterality Date    AMPUTATION-TOE, partial Left 11/25/2016    Performed by Jose Delacruz Jr., DPM at [facility]    BIOPSY, LYMPH NODE, SENTINEL Left 2/25/2013    Performed by Dirk Oneil MD at [facility] OR [facility]    BLOCK-NERVE-MEDIAL BRANCH-LUMBAR Bilateral 3/1/2016    Performed by [facility]ie Neg[facility]te MD at [facility]    BLOCK-NERVE-MEDIAL BRANCH-LUMBAR Bilateral 2/23/2016    Performed by Stacie [facility]te MD at [facility]    BREAST BIOPSY  1/2012    left breast invasive mammary carcinoma (lateral bx) and intraductal papilloma (medial bx)    BREAST BIOPSY  1999    rt breast FA    CHOLECYSTECTOMY  2001    COLON SURGERY      CREATION -FISTULA-AV Right 5/28/2018    Performed by RICK Pollard III, MD at [facility] OR [facility]    EXTRACTION, CATARACT, WITH IOL INSERTION Left 1/31/2019    Per[facility] by [facility] at [facility]    EXTRACTION, CATARACT, WITH IOL INSERTION Right 1/24/2019    Performed by [facility]ada MD at [facility] OR    HERNIA REPAIR      INJECTION, FOR SENTINEL LYMPH NODE IDENTIFICATION Left  2/25/2013    Performed by [facility]il [facility] at [facility]    INJECTION-JOINT Bilateral 1/20/2016    Performed by [facility]ie [facility] MD at [facility]    INSERTION, TISSUE EXPANDER, BREAST Left 2/25/2013    Performed by Terry Moreno MD at [facility]    INSERTION-CATHETER-DIALYSIS Right 4/23/2018    Per[facility] by Jenae [facility]ero DO at [facility] OR    INSERTION-CATHETER-BRENNEN CATH - C-ARM N/A 11/11/2016    Performed by Bobby Blount Jr., [facility] at [facility] OR    left nephrectomy      atrophic kidney and hydronephrosis    left oopherectomy  2001    MASTECTOMY  2013    left    MASTECTOMY Left 2/25/2013    Performed by [facility]irk [facility]il MD at [facility]    MASTOPEXY Right 2/25/2013    Performed by Terry [facility]no MD at [facility]    NEPHRECTOMY  2011    lap    RADIOFREQUENCY THERMOCOAGULATION (RFTC)-NERVE-MEDIAN BRANCH-LUMBAR Left 3/30/2016    [facility]    RADIOFREQUENCY THERMOCOAGULATION (RFTC)-NERVE-MEDIAN BRANCH-LUMBAR Right 3/16/2016    [facility] at [facility]    RECONSTRUCTION, BREAST, USING LATISSIMUS DORSI MYOCUTANEOUS FLAP Left 4/11/2013    Performed by Terry [facility]no MD at [facility] OR [facility]    RECONSTRUCTION-NIPPLE Left 1/9/2014    Performed by Terry [facility]no MD at [facility]    REPAIR, HERNIA, VENTRAL, LAPAROSCOPIC N/A 3/28/2014    Performed by Min Polanco MD at [facility] OR [facility]    REVISION Bilateral 1/9/2014    Performed by Terry [facility]no MD at [facility]    REVISION, AV FISTULA Right 8/15/2018    Performed by RICK Pollard III, MD at [facility]    SIGMOIDECTOMY  2001    TOTAL REDUCTION MAMMOPLASTY Right 2013    TRANSPOSITION, VEIN Right 8/15/2018    Performed by RICK Pollard III, MD at [facility] OR [facility]       Review of patient's allergies indicates:   Allergen Reactions    Cyclobenzaprine Hallucinations    Erythromycin      Stomach  upset    Keflex [cephalexin]      Yeast infection       Current Facility-Administered Medications   Medication    0.9%  NaCl infusion    acetaminophen tablet 500 mg    acetaminophen-codeine 300-30mg per tablet 1 tablet    albuterol-ipratropium 2.5 mg-0.5 mg/3 mL nebulizer solution 3 mL    [START ON 2/14/2019] amLODIPine tablet 10 mg    [START ON 2/14/2019] atorvastatin tablet 40 mg    [START ON 2/14/2019] calcium acetate capsule 667 mg    carvedilol tablet 25 mg    citalopram tablet 20 mg    dextrose 50% injection 12.5 g    dextrose 50% injection 25 g    [START ON 2/14/2019] ergocalciferol capsule 50,000 Units    [START ON 2/14/2019] fluticasone 50 mcg/actuation nasal spray 100 mcg    gabapentin capsule 400 mg    glucagon (human recombinant) injection 1 mg    glucose chewable tablet 16 g    glucose chewable tablet 24 g    heparin (porcine) injection 5,000 Units    hydrALAZINE tablet 25 mg    insulin aspart U-100 pen 0-5 Units    [START ON 2/14/2019] insulin aspart U-100 pen 9 Units    insulin detemir U-100 pen 45 Units    letrozole tablet 2.5 mg    omega-3 fatty acids-fish oil 340-1,000 mg capsule 1 capsule    ondansetron disintegrating tablet 8 mg    orphenadrine 12 hr tablet 100 mg    promethazine tablet 12.5 mg    ramelteon tablet 8 mg    senna-docusate 8.6-50 mg per tablet 1 tablet    sodium chloride 0.9% flush 5 mL     Facility-Administered Medications Ordered in Other Encounters   Medication    mupirocin 2 % ointment     Family History     Problem Relation (Age of Onset)    Arthritis Mother    Breast cancer Maternal Grandmother    Cancer Maternal Grandmother, Maternal Aunt    Celiac disease Sister    Diabetes Father    Heart disease Mother, Father, Maternal Grandmother        Tobacco Use    Smoking status: Current Some Day Smoker     Packs/day: 0.50     Years: 28.00     Pack years: 14.00     Types: Cigarettes     Start date: 1/1/1990     Last attempt to quit: 1/1/2018     Years  "since quittin.1    Smokeless tobacco: Never Used    Tobacco comment: anxious   Substance and Sexual Activity    Alcohol use: No    Drug use: No    Sexual activity: No     Partners: Male     ROS  Objective:     Vital Signs (Most Recent):  Temp: 97.4 °F (36.3 °C) (19)  Pulse: 84 (19)  Resp: 18 (19)  BP: (!) 176/81 (19)  SpO2: 97 % (19) Vital Signs (24h Range):  Temp:  [97.4 °F (36.3 °C)-98.2 °F (36.8 °C)] 97.4 °F (36.3 °C)  Pulse:  [74-91] 84  Resp:  [16-24] 18  SpO2:  [92 %-97 %] 97 %  BP: (167-215)/() 176/81     Weight: 122 kg (268 lb 15.4 oz)  Height: 5' 4" (162.6 cm)  Body mass index is 46.17 kg/m².    No intake or output data in the 24 hours ending 19    Ortho/SPM Exam     Gen:  No acute distress  CV:  Peripherally well-perfused.  Pulses 2+ bilaterally.  Lungs:  Normal respiratory effort.  Abdomen:  Soft, non-tender, non-distended  Head/Neck:  Normocephalic.  Atraumatic. No TTP, AROM and PROM intact without pain  Neuro:  CN intact without deficit, SILT throughout B/L Upper & Lower Extremities  Pelvis: No TTP, Stable to direct anterior pressure over ASIS.    MSK:  BLE:  - there is chronic edema,  No ttp at the hip knee or ankle  - AROM and PROM of the hip and knee is intact and without pain  -log roll negative   -straight leg test is positive bilaterally  - TA/EHL/Gastroc/FHL assessed in isolation without deficit  - SILT throughout  - DP and PT palpated  2+  - Capillary Refill <3s        Significant Labs: All pertinent labs within the past 24 hours have been reviewed.    Significant Imaging: I have reviewed and interpreted all pertinent imaging results/findings.   Bilateral knee osteoarthritis   Bilateral hip osteoarthritis   Assessment/Plan:     Primary osteoarthritis involving multiple joints    Kylie King is a 63 y.o. female with osteoarthritis of both knees and sciatica.    -Patient has a long history of lumbar stenosis and " osteoarthritis.  Believe that the patients current increase in weakness is due to her lumbar stenosis and her pain is very typical of sciatica and she has minimal complaints of knee joint or groin pain.  No acute fractures.  She has DJD of bilateral hips and knees.  Recommend PT for osteoarthritis.         Thank you for your consult. I will follow-up with patient. Please contact us if you have any additional questions.    Joe Patterson MD  Orthopedics  [facility]

## 2019-02-14 NOTE — ASSESSMENT & PLAN NOTE
Kylie King is a 63 y.o. female with osteoarthritis of both knees and sciatica.    -Patient has a long history of lumbar stenosis and osteoarthritis.  Believe that the patients current increase in weakness is due to her lumbar stenosis and her pain is very typical of sciatica and she has minimal complaints of knee joint or groin pain.  No acute fractures.  Recommend PT for osteoarthritis.

## 2019-02-14 NOTE — PT/OT/SLP EVAL
Physical Therapy Evaluation    Patient Name:  Kylie King   MRN:  [id number]    Recommendations:     Discharge Recommendations:  rehabilitation facility   Discharge Equipment Recommendations: (TBD)   Barriers to discharge: Inaccessible home and Decreased caregiver support    Assessment:     Kylie King is a 63 y.o. female admitted with a medical diagnosis of Lumbar stenosis.  She presents with the following impairments/functional limitations:  weakness, impaired endurance, gait instability, impaired functional mobilty, decreased upper extremity function, impaired balance, decreased lower extremity function, impaired self care skills Patient tolerated evaluation  fairly well. Patient limited at this time by increased A for all OOB activities. Pt with max x 2 for squat pivot transfer from stretcher to hospital bed. Attempted to stand with RW x 2 trials with max A and only minimal buttock clearance. Pt lives with a friend who is also not physically fit, has 5 MARIA LUISA and was driving and ambulating with rollator mod ( I ) PTA. Pt has reported around 8 falls within the past week.  Patient will continue to require skilled PT services to address the above impairments to return to prior level of function as independent as possible.     Patient would benefit and can tolerate an inpatient rehabilitation therapy stay. Patient requires an inpatient rehabilitation  stay  to address the impairments listed above in a multidisciplinary, therapy intense setting. Skilled, intensive  therapy is needed  to facilitate patients return to prior level of function as independent as possible, decrease caregiver burden and reduce risk of falls.           Rehab Prognosis: Fair; patient would benefit from acute skilled PT services to address these deficits and reach maximum level of function.    Recent Surgery: * No surgery found *      Plan:     During this hospitalization, patient to be seen 4 x/week to address the identified rehab  "impairments via gait training, therapeutic activities, therapeutic exercises, neuromuscular re-education and progress toward the following goals:    · Plan of Care Expires:  03/14/19    Subjective     Chief Complaint: " I dont know why im shaking so much"   Patient/Family Comments/goals: to go home  Pain/Comfort:  · Pain Rating 1: (pain in post buttock/LLE but not quantified)    Patients cultural, spiritual, [employer] conflicts given the current situation: no    Living Environment:  Pt lives in a [facility] with R.   Prior to admission, patients level of function was mod ( I) for household ambulation and ADLs, pt states she used w/c, electric scooter for extended community distances.  Equipment used at home: walker, rolling, rollator(owns but not using cane).  DME owned (not currently used): none.  Upon discharge, patient will have assistance from " friend" who req help themselves per pt.    Objective:     Communicated with RN  prior to session.  Patient found sitting EOB on stretcher with RN/transport with  (no active lines)  upon PT entry to room.    General Precautions: Standard, fall   Orthopedic Precautions:N/A   Braces: N/A       Exams    Cognition:  · Patient is Oriented X 4, Alert and Cooperative  · Patient Follows multistep  commands 100 % of the time.     Coordination    Fine Motor: Gross Motor:    -       Impaired  Left hand, finger to nose reduced B but dysmetric, Right hand, finger to nose reduced B but dysmetric, Left hand, diadochokinesis skill reduced B and Right hand, diadochokinesis skill reduced B  mild post lean but able to self correct with v/c       Sensation  Patient's sensation was  Intact to light touch at  entire leg , bilaterally    Skin integrity    · -       Skin integrity: Visible skin intact     Posture  · -       Rounded shoulders  · -       Forward head  · -       Posterior pelvic tilt    ROM and Strength     Left  Right    Iliopsoas  4/5 3/5   quadriceps 5/5 3/5   hamstrings  " 5/5 3/5   anterior tibial 5/5 4/5           LLE ROM RLE ROM   Hip flexion WFL WFL   Knee ext WFL WFL   Knee flex WFL WFL   Ankle PF WFL WFL   Ankle DF WFL WFL     Functional Mobilty    · Bed Mobility:  Sit to Supine: moderate assistance  · Transfers:  Sit to Stand:  maximal assistance with rolling walker and unsuccessfult x 2 attempts with min buttock clearance  · One trial of sit to stand at stretcher with max A HHA; PT blocking BLE  · Stretcher to bed: maximal assistance and of 2 persons with  no AD and hand-held assist  using  Squat Pivot      Gait    · Not able to assess    Balance:   Static Sitting:   FAIR+: Able to take MINIMAL challenges from all directions  Dynamic Sitting:   FAIR: Cannot move trunk without losing balance  Static Standin: Needs MAXIMAL assist to maintain   Dynamic Standin: N/A      Therapeutic Activities and Exercises:  · Pt edu on benefits of inpatient rehab stay and expectaions     Patient education  · Patient educated on the role of PT and POC  · Patient educated on importance  activity while in the hosptial per tolerance for improved endurance and to limit deconditioning   · Patient educated on safe transfers with nursing as appropriate  · Patient educated on energy conservation, pursed lip breathing  · Patient educated on proper transfer mechanics and safety  · All of patients questions were answered within the scope of PT        AM-PAC 6 CLICK MOBILITY  Total Score:11     Patient left with bed in chair position with all lines intact and call button in reach.    GOALS:   Multidisciplinary Problems     Physical Therapy Goals        Problem: Physical Therapy Goal    Goal Priority Disciplines Outcome Goal Variances Interventions   Physical Therapy Goal     PT, PT/OT Ongoing (interventions implemented as appropriate)     Description:  Goals to be met by: 19     Patient will increase functional independence with mobility by performin. Supine to sit with Contact Guard  Assistance  2. Sit to supine with Contact Guard Assistance  3. Sit to stand transfer with Moderate Assistance  4. Bed to chair transfer with Moderate Assistance using Rolling Walker or LRD.   5. Gait  x 10 feet with Maximum Assistance using Rolling Walker or LRD.   6. Ascend/descend 5 stair with bilateral Handrails Maximum Assistance using Rolling Walker or LRD.   7. Stand for 2 minutes with Moderate Assistance using Rolling Walker or LRD.                       History:     Past Medical History:   Diagnosis Date    Anxiety     Arthritis     DDD, Lumbar    Breast cancer 1/2013    left breast- invasive mammary carcinoma, ER/ID positive, Her2 negative    Carotid artery stenosis 8/13/2018 6/22/2018: Carotid Duplex: SHANELL: Moderate plaquing - 2.0 m/s - <50%. LICA: Moderate plaquing - 1.6 m/s - <50%.    Cataract     Choledocholithiasis     s/p ERCP and stent placement    Chronic obstructive pulmonary disease 6/8/2018    Chronic venous insufficiency     Colon cancer 2001    CRI (chronic renal insufficiency)     one kidney    Dialysis AV fistula malfunction     ESRD (end stage renal disease) on dialysis     Former smoker 6/8/2018    GERD (gastroesophageal reflux disease)     History of acute pancreatitis 2009 2/09 s/p sphincterotomy    History of cerebrovascular accident 6/8/2018    History of colon cancer     s/p sigmoid colectomy    HTN (hypertension), benign     Hypercholesterolemia     Hyperlipidemia     Hypoxemia 6/8/2018    Intracerebral hemorrhage     Kidney stone     left    Lymphedema of both lower extremities     Obesity     Pancreatitis     Pancreatitis 2008    Physical deconditioning     Pulmonary edema     Sacroiliac joint pain     Spinal stenosis     Spinal stenosis, lumbar     Stroke 12/2012    Tobacco abuse     Type 2 diabetes mellitus with neurological manifestations, controlled     Neuropathy       Past Surgical History:   Procedure Laterality Date     AMPUTATION-TOE, partial Left 11/25/2016    Performed by Jose Delacruz Jr., DPM at [facility] OR 2ND FLR    BIOPSY, LYMPH NODE, SENTINEL Left 2/25/2013    Performed by Dirk Oneil MD at [facility] OR 2ND [facility]LR    BLOCK-NERVE-MEDIAL BRANCH-LUMBAR Bilateral 3/1/2016    [facility] at [facility]    BLOCK-NERVE-MEDIAL BRANCH-LUMBAR Bilateral 2/23/2016    [facility]    BREAST BIOPSY  1/2012    left breast invasive mammary carcinoma (lateral bx) and intraductal papilloma (medial bx)    BREAST BIOPSY  1999    rt breast FA    CHOLECYSTECTOMY  2001    COLON SURGERY      CREATION -FISTULA-AV Right 5/28/2018    Performed by RICK Pollard III, MD at [facility] OR 2ND FLR    EXTRACTION, CATARACT, WITH IOL INSERTION Left 1/31/2019    Per[facility] by [facility]manuel [facility]ada [facility]    EXTRACTION, CATARACT, WITH IOL INSERTION Right 1/24/2019    Performed by [facility]manuel [facility]ada MD at [facility]    HERNIA REPAIR      INJECTION, FOR SENTINEL LYMPH NODE IDENTIFICATION Left 2/25/2013    Performed by Dirk Oneil MD at [facility] OR 2ND FLR    INJECTION-JOINT Bilateral 1/20/2016    [facility]    INSERTION, TISSUE EXPANDER, BREAST Left 2/25/2013    Performed by Terry Moreno MD at [facility] OR 2ND FLR    INSERTION-CATHETER-DIALYSIS Right 4/23/2018    [facility] OR    INSERTION-CATHETER-BRENNEN CATH - C-ARM N/A 11/11/2016    Performed by Bobby Blount Jr., MD at [facility] OR    left nephrectomy      atrophic kidney and hydronephrosis    left oopherectomy  2001    MASTECTOMY  2013    left    MASTECTOMY Left 2/25/2013    Performed by Dirk [facility]il MD at [facility] OR 2ND FLR    MASTOPEXY Right 2/25/2013    Performed by Terry Moreno MD at [facility] OR 2ND FLR    NEPHRECTOMY  2011    lap    RADIOFREQUENCY THERMOCOAGULATION (RFTC)-NERVE-MEDIAN BRANCH-LUMBAR Left 3/30/2016    Performed by Stacie MONCADA  MD [facility] at [facility]    RADIOFREQUENCY THERMOCOAGULATION (RFTC)-NERVE-MEDIAN BRANCH-LUMBAR Right 3/16/2016    Performed by [facility] at [facility]    RECONSTRUCTION, BREAST, USING LATISSIMUS DORSI MYOCUTANEOUS FLAP Left 4/11/2013    Performed by Terry [facility] MD at [facility] OR [facility]    RECONSTRUCTION-NIPPLE Left 1/9/2014    Performed by Terry [facility]no MD at [facility] OR [facility]    REPAIR, HERNIA, VENTRAL, LAPAROSCOPIC N/A 3/28/2014    Performed by Min Polanco MD at [facility] OR [facility]    REVISION Bilateral 1/9/2014    Performed by Terry [facility] MD at [facility] OR [facility]    REVISION, AV FISTULA Right 8/15/2018    [facility]    SIGMOIDECTOMY  2001    TOTAL REDUCTION MAMMOPLASTY Right 2013    TRANSPOSITION, VEIN Right 8/15/2018    Performed by RICK Pollard III, MD at [facility] OR [facility] FLR       Time Tracking:     PT Received On: 02/14/19  PT Start Time: 1022     PT Stop Time: 1041  PT Total Time (min): 19 min     Billable Minutes: Evaluation 10 min, therapeutic activites: 8 min        Iván Petersen, PT  02/14/2019

## 2019-02-14 NOTE — PLAN OF CARE
Problem: Adult Inpatient Plan of Care  Goal: Plan of Care Review  Pt up to commode x2 today. She is very weak and it requires two staff for transfer. PT worked with pt and she states they are going to work with her to develop a plan for discharge. Refused pain med today; she stated she will ask if pain increases.

## 2019-02-14 NOTE — PLAN OF CARE
I discussed the case with the Ortho resident.  They feel strongly that the weakness and sciatica pain are likely coming from the L4 severe spinal stenosis.  Will plan to consult Ortho Spine service today, Dr. Olivia, who will evaluate for possible surgical or alternative therapies.

## 2019-02-14 NOTE — ED NOTES
Pt placed on portable telemetry monitoring box # [id number].  Ability to visualize pt's rhythm confirmed with NSR of telemetry.  NSR with a HR of 75 noted.

## 2019-02-14 NOTE — SUBJECTIVE & OBJECTIVE
Past Medical History:   Diagnosis Date    Anxiety     Arthritis     DDD, Lumbar    Breast cancer 1/2013    left breast- invasive mammary carcinoma, ER/MN positive, Her2 negative    Carotid artery stenosis 8/13/2018 6/22/2018: Carotid Duplex: SHANELL: Moderate plaquing - 2.0 m/s - <50%. LICA: Moderate plaquing - 1.6 m/s - <50%.    Cataract     Choledocholithiasis     s/p ERCP and stent placement    Chronic obstructive pulmonary disease 6/8/2018    Chronic venous insufficiency     Colon cancer 2001    CRI (chronic renal insufficiency)     one kidney    Dialysis AV fistula malfunction     ESRD (end stage renal disease) on dialysis     Former smoker 6/8/2018    GERD (gastroesophageal reflux disease)     History of acute pancreatitis 2009 2/09 s/p sphincterotomy    History of cerebrovascular accident 6/8/2018    History of colon cancer     s/p sigmoid colectomy    HTN (hypertension), benign     Hypercholesterolemia     Hyperlipidemia     Hypoxemia 6/8/2018    Intracerebral hemorrhage     Kidney stone     left    Lymphedema of both lower extremities     Obesity     Pancreatitis     Pancreatitis 2008    Physical deconditioning     Pulmonary edema     Sacroiliac joint pain     Spinal stenosis     Spinal stenosis, lumbar     Stroke 12/2012    Tobacco abuse     Type 2 diabetes mellitus with neurological manifestations, controlled     Neuropathy       Past Surgical History:   Procedure Laterality Date    AMPUTATION-TOE, partial Left 11/25/2016    Performed by Jose Delacruz Jr., DPM at [facility] OR 2ND FLR    BIOPSY, LYMPH NODE, SENTINEL Left 2/25/2013    Performed by Dirk Oneil MD at [facility] OR 2ND FLR    BLOCK-NERVE-MEDIAL BRANCH-LUMBAR Bilateral 3/1/2016    Performed by [facility]ie [facility]te [facility] at [facility]    BLOCK-NERVE-MEDIAL BRANCH-LUMBAR Bilateral 2/23/2016    Performed by [facility]ie [facility] MD at [facility]    BREAST BIOPSY  1/2012    left breast invasive mammary  carcinoma (lateral bx) and intraductal papilloma (medial bx)    BREAST BIOPSY  1999    rt breast FA    CHOLECYSTECTOMY  2001    COLON SURGERY      CREATION -FISTULA-AV Right 5/28/2018    Performed by RICK Pollard III, MD at [facility] OR 2ND FLR    EXTRACTION, CATARACT, WITH IOL INSERTION Left 1/31/2019    Performed by Immanuel Moncada MD at [facility] OR    EXTRACTION, CATARACT, WITH IOL INSERTION Right 1/24/2019    Performed by Immanuel Moncada MD at [facility] OR    HERNIA REPAIR      INJECTION, FOR SENTINEL LYMPH NODE IDENTIFICATION Left 2/25/2013    Performed by Dirk Oneil MD at [facility] OR 2ND FLR    INJECTION-JOINT Bilateral 1/20/2016    Performed by Stacie Negrete MD at [facility] PAIN MGT    INSERTION, TISSUE EXPANDER, BREAST Left 2/25/2013    Performed by Terry Moreno MD at [facility] OR 2ND FLR    INSERTION-CATHETER-DIALYSIS Right 4/23/2018    Performed by Jenae Salguero DO at [facility] OR    INSERTION-CATHETER-BRENNEN CATH - C-ARM N/A 11/11/2016    Performed by Bobby Blount Jr., MD at [facility] OR    left nephrectomy      atrophic kidney and hydronephrosis    left oopherectomy  2001    MASTECTOMY  2013    left    MASTECTOMY Left 2/25/2013    Performed by Dirk Oneil MD at [facility] OR 2ND FLR    MASTOPEXY Right 2/25/2013    Performed by Terry Moreno MD at [facility] OR 2ND FLR    NEPHRECTOMY  2011    lap    RADIOFREQUENCY THERMOCOAGULATION (RFTC)-NERVE-MEDIAN BRANCH-LUMBAR Left 3/30/2016    Performed by Stacie Negrete MD at [facility] PAIN MGT    RADIOFREQUENCY THERMOCOAGULATION (RFTC)-NERVE-MEDIAN BRANCH-LUMBAR Right 3/16/2016    Performed by Stacie Negrete MD at [facility] PAIN MGT    RECONSTRUCTION, BREAST, USING LATISSIMUS DORSI MYOCUTANEOUS FLAP Left 4/11/2013    Performed by Terry Moreno MD at [facility] OR 2ND FLR    RECONSTRUCTION-NIPPLE Left 1/9/2014    Performed by Terry Moreno MD at [facility] OR 2ND FLR    REPAIR, HERNIA, VENTRAL, LAPAROSCOPIC N/A 3/28/2014    Performed by  Min Polanco MD at [facility] OR 2ND FLR    REVISION Bilateral 1/9/2014    Performed by Terry Moreno MD at [facility] OR 2ND FLR    REVISION, AV FISTULA Right 8/15/2018    Performed by RICK Pollard III, MD at [facility] OR 2ND FLR    SIGMOIDECTOMY  2001    TOTAL REDUCTION MAMMOPLASTY Right 2013    TRANSPOSITION, VEIN Right 8/15/2018    Performed by RICK Pollard III, MD at [facility] OR 2ND FLR       Review of patient's allergies indicates:   Allergen Reactions    Cyclobenzaprine Hallucinations    Erythromycin      Stomach upset    Keflex [cephalexin]      Yeast infection     Current Facility-Administered Medications   Medication Frequency    0.9%  NaCl infusion PRN    acetaminophen tablet 500 mg Q6H PRN    acetaminophen-codeine 300-30mg per tablet 1 tablet Q8H PRN    albuterol-ipratropium 2.5 mg-0.5 mg/3 mL nebulizer solution 3 mL Q4H PRN    amLODIPine tablet 10 mg QAM    atorvastatin tablet 40 mg Daily    calcium acetate capsule 667 mg TID WM    carvedilol tablet 25 mg BID    citalopram tablet 20 mg Nightly    dextrose 50% injection 12.5 g PRN    dextrose 50% injection 25 g PRN    ergocalciferol capsule 50,000 Units Q7 Days    fluticasone 50 mcg/actuation nasal spray 100 mcg Daily    gabapentin capsule 400 mg QHS    glucagon (human recombinant) injection 1 mg PRN    glucose chewable tablet 16 g PRN    glucose chewable tablet 24 g PRN    heparin (porcine) injection 5,000 Units Q8H    hydrALAZINE tablet 25 mg BID    insulin aspart U-100 pen 0-5 Units QID (AC + HS) PRN    insulin aspart U-100 pen 9 Units TIDWM    insulin detemir U-100 pen 45 Units BID    letrozole tablet 2.5 mg Nightly    omega-3 fatty acids-fish oil 340-1,000 mg capsule 1 capsule BID    ondansetron disintegrating tablet 8 mg Q8H PRN    orphenadrine 12 hr tablet 100 mg BID PRN    promethazine tablet 12.5 mg Q6H PRN    ramelteon tablet 8 mg Nightly PRN    senna-docusate 8.6-50 mg per tablet 1 tablet BID     "sodium chloride 0.9% flush 5 mL PRN     Facility-Administered Medications Ordered in Other Encounters   Medication Frequency    mupirocin 2 % ointment On Call Procedure     Family History     Problem Relation (Age of Onset)    Arthritis Mother    Breast cancer Maternal Grandmother    Cancer Maternal Grandmother, Maternal Aunt    Celiac disease Sister    Diabetes Father    Heart disease Mother, Father, Maternal Grandmother        Tobacco Use    Smoking status: Current Some Day Smoker     Packs/day: 0.50     Years: 28.00     Pack years: 14.00     Types: Cigarettes     Start date: 1990     Last attempt to quit: 2018     Years since quittin.1    Smokeless tobacco: Never Used    Tobacco comment: anxious   Substance and Sexual Activity    Alcohol use: No    Drug use: No    Sexual activity: No     Partners: Male     Review of Systems   HENT: Negative.    Eyes: Negative.    Respiratory: Negative.    Cardiovascular: Negative.    Gastrointestinal: Negative.    Endocrine: Negative.    Genitourinary: Negative.    Musculoskeletal: Negative.    Skin: Negative.    Allergic/Immunologic: Negative.    Neurological: Positive for tremors and weakness.        Patient reports fell "yesterday morning"   Hematological: Bruises/bleeds easily.   Psychiatric/Behavioral: Negative.      Objective:     Vital Signs (Most Recent):  Temp: 98.7 °F (37.1 °C) (19 0801)  Pulse: 70 (19 0816)  Resp: 20 (19 07)  BP: 128/65 (19 07)  SpO2: (!) 94 % (191)  O2 Device (Oxygen Therapy): nasal cannula (19 08) Vital Signs (24h Range):  Temp:  [97.4 °F (36.3 °C)-98.7 °F (37.1 °C)] 98.7 °F (37.1 °C)  Pulse:  [62-91] 70  Resp:  [16-24] 20  SpO2:  [92 %-97 %] 94 %  BP: (114-215)/() 128/65     Weight: 122 kg (268 lb 15.4 oz) (19 1843)  Body mass index is 46.17 kg/m².  Body surface area is 2.35 meters squared.    No intake/output data recorded.    Physical Exam   Constitutional: She is " oriented to person, place, and time. She appears well-developed and well-nourished.   HENT:   Head: Normocephalic and atraumatic.   Eyes: Conjunctivae and EOM are normal. Pupils are equal, round, and reactive to light.   Neck: Normal range of motion. Neck supple.   Cardiovascular: Normal rate, regular rhythm, normal heart sounds and intact distal pulses.   Pulmonary/Chest: Effort normal and breath sounds normal.   Abdominal: Soft. Bowel sounds are normal.   Musculoskeletal: She exhibits edema.   Bilateral LE edema. Decreased ROM to LEs   Neurological: She is alert and oriented to person, place, and time.   tremors   Skin: Skin is warm and dry. Capillary refill takes less than 2 seconds.   AVF to R lower arm with + thrill/+ bruit. PIV to L hand. Ecchymosis to hands. Ulcers to lower legs; also at toes to feet; malodorous; skin pink (pt reports a history of cellulitis and states was f/b Wound Care)   Psychiatric: She has a normal mood and affect. Her behavior is normal. Judgment and thought content normal.   Nursing note and vitals reviewed.      Significant Labs:  All labs within the past 24 hours have been reviewed.    Significant Imaging:  Labs: Reviewed  ECG: Reviewed  Echo: Reviewed

## 2019-02-14 NOTE — ASSESSMENT & PLAN NOTE
ESRD on HD for metabolic clearance and fluid removal  -Maintain MWF HD schedule while inpatient  -Obtain OP HD medical record  -Daily CBC, renal function panel  -Renal diet  -1 liter fluid restrictions per day  -Daily weights and chart  -Strict I/O and chart  -Medications to renal parameter    Dialysis Information: iHD  -Out patient HD unit: [facility]  -Nephrologist: Dr. Siegel  -HD tx days: MWF  -HD tx time: 4 hours 30 minutes  -HD access: AVF   -Last OP HD: Friday, Feb 8th  -Last IP HD: Today, Feb 14th  -EDW: 118 kg  -RRF: oliguric    Hep B sAg negative 2/14/19    Anemia: Hb > 7 gm/dL; hold epogen  MBD: start OP Ca acetate dose TID WM    HTN  - Maintain MAP >65  - Keep target Bp <140/90

## 2019-02-14 NOTE — SUBJECTIVE & OBJECTIVE
Past Medical History:   Diagnosis Date    Anxiety     Arthritis     DDD, Lumbar    Breast cancer 1/2013    left breast- invasive mammary carcinoma, ER/CT positive, Her2 negative    Carotid artery stenosis 8/13/2018 6/22/2018: Carotid Duplex: SHANELL: Moderate plaquing - 2.0 m/s - <50%. LICA: Moderate plaquing - 1.6 m/s - <50%.    Cataract     Choledocholithiasis     s/p ERCP and stent placement    Chronic obstructive pulmonary disease 6/8/2018    Chronic venous insufficiency     Colon cancer 2001    CRI (chronic renal insufficiency)     one kidney    Dialysis AV fistula malfunction     ESRD (end stage renal disease) on dialysis     Former smoker 6/8/2018    GERD (gastroesophageal reflux disease)     History of acute pancreatitis 2009 2/09 s/p sphincterotomy    History of cerebrovascular accident 6/8/2018    History of colon cancer     s/p sigmoid colectomy    HTN (hypertension), benign     Hypercholesterolemia     Hyperlipidemia     Hypoxemia 6/8/2018    Intracerebral hemorrhage     Kidney stone     left    Lymphedema of both lower extremities     Obesity     Pancreatitis     Pancreatitis 2008    Physical deconditioning     Pulmonary edema     Sacroiliac joint pain     Spinal stenosis     Spinal stenosis, lumbar     Stroke 12/2012    Tobacco abuse     Type 2 diabetes mellitus with neurological manifestations, controlled     Neuropathy       Past Surgical History:   Procedure Laterality Date    AMPUTATION-TOE, partial Left 11/25/2016    Performed by Jose Delacruz Jr., DPM at [facility] OR 2ND FLR    BIOPSY, LYMPH NODE, SENTINEL Left 2/25/2013    Performed by Dirk Oneil MD at [facility] OR 2ND FLR    BLOCK-NERVE-MEDIAL BRANCH-LUMBAR Bilateral 3/1/2016    Performed by [facility]acie [facility]te MD at [facility]    BLOCK-NERVE-MEDIAL BRANCH-LUMBAR Bilateral 2/23/2016    Performed by [facility]ie [facility] MD at [facility]    BREAST BIOPSY  1/2012    left breast invasive mammary  carcinoma (lateral bx) and intraductal papilloma (medial bx)    BREAST BIOPSY  1999    rt breast FA    CHOLECYSTECTOMY  2001    COLON SURGERY      CREATION -FISTULA-AV Right 5/28/2018    Performed by RICK Pollard III, MD at [facility] OR 2ND FLR    EXTRACTION, CATARACT, WITH IOL INSERTION Left 1/31/2019    Performed by Immanuel Moncada MD at [facility] OR    EXTRACTION, CATARACT, WITH IOL INSERTION Right 1/24/2019    Performed by Immanuel Moncada MD at [facility] OR    HERNIA REPAIR      INJECTION, FOR SENTINEL LYMPH NODE IDENTIFICATION Left 2/25/2013    Performed by Dirk Oneil MD at [facility] OR 2ND FLR    INJECTION-JOINT Bilateral 1/20/2016    Performed by Stacie Negrete MD at [facility] PAIN MGT    INSERTION, TISSUE EXPANDER, BREAST Left 2/25/2013    Performed by Terry Moreno MD at [facility] OR 2ND FLR    INSERTION-CATHETER-DIALYSIS Right 4/23/2018    Performed by Jenae Salguero DO at [facility] OR    INSERTION-CATHETER-BRENNEN CATH - C-ARM N/A 11/11/2016    Performed by Bobby Blount Jr., MD at [facility] OR    left nephrectomy      atrophic kidney and hydronephrosis    left oopherectomy  2001    MASTECTOMY  2013    left    MASTECTOMY Left 2/25/2013    Performed by iDrk Oneil MD at [facility] OR 2ND FLR    MASTOPEXY Right 2/25/2013    Performed by Terry Moreno MD at [facility] OR 2ND FLR    NEPHRECTOMY  2011    lap    RADIOFREQUENCY THERMOCOAGULATION (RFTC)-NERVE-MEDIAN BRANCH-LUMBAR Left 3/30/2016    Performed by Stacie Negrete MD at [facility] PAIN MGT    RADIOFREQUENCY THERMOCOAGULATION (RFTC)-NERVE-MEDIAN BRANCH-LUMBAR Right 3/16/2016    Performed by Stacie Negrete MD at [facility] PAIN MGT    RECONSTRUCTION, BREAST, USING LATISSIMUS DORSI MYOCUTANEOUS FLAP Left 4/11/2013    Performed by Terry Moreno MD at [facility] OR 2ND FLR    RECONSTRUCTION-NIPPLE Left 1/9/2014    Performed by Terry Moreno MD at [facility] OR 2ND FLR    REPAIR, HERNIA, VENTRAL, LAPAROSCOPIC N/A 3/28/2014    Performed by  Min Polanco MD at [facility] OR 2ND FLR    REVISION Bilateral 1/9/2014    Performed by Terry Moreno MD at [facility] OR 2ND FLR    REVISION, AV FISTULA Right 8/15/2018    Performed by RICK Pollard III, MD at [facility] OR 2ND FLR    SIGMOIDECTOMY  2001    TOTAL REDUCTION MAMMOPLASTY Right 2013    TRANSPOSITION, VEIN Right 8/15/2018    Performed by RICK Pollard III, MD at [facility] OR 2ND FLR       Review of patient's allergies indicates:   Allergen Reactions    Cyclobenzaprine Hallucinations    Erythromycin      Stomach upset    Keflex [cephalexin]      Yeast infection       Current Facility-Administered Medications   Medication    0.9%  NaCl infusion    acetaminophen tablet 500 mg    acetaminophen-codeine 300-30mg per tablet 1 tablet    albuterol-ipratropium 2.5 mg-0.5 mg/3 mL nebulizer solution 3 mL    [START ON 2/14/2019] amLODIPine tablet 10 mg    [START ON 2/14/2019] atorvastatin tablet 40 mg    [START ON 2/14/2019] calcium acetate capsule 667 mg    carvedilol tablet 25 mg    citalopram tablet 20 mg    dextrose 50% injection 12.5 g    dextrose 50% injection 25 g    [START ON 2/14/2019] ergocalciferol capsule 50,000 Units    [START ON 2/14/2019] fluticasone 50 mcg/actuation nasal spray 100 mcg    gabapentin capsule 400 mg    glucagon (human recombinant) injection 1 mg    glucose chewable tablet 16 g    glucose chewable tablet 24 g    heparin (porcine) injection 5,000 Units    hydrALAZINE tablet 25 mg    insulin aspart U-100 pen 0-5 Units    [START ON 2/14/2019] insulin aspart U-100 pen 9 Units    insulin detemir U-100 pen 45 Units    letrozole tablet 2.5 mg    omega-3 fatty acids-fish oil 340-1,000 mg capsule 1 capsule    ondansetron disintegrating tablet 8 mg    orphenadrine 12 hr tablet 100 mg    promethazine tablet 12.5 mg    ramelteon tablet 8 mg    senna-docusate 8.6-50 mg per tablet 1 tablet    sodium chloride 0.9% flush 5 mL     Facility-Administered Medications  "Ordered in Other Encounters   Medication    mupirocin 2 % ointment     Family History     Problem Relation (Age of Onset)    Arthritis Mother    Breast cancer Maternal Grandmother    Cancer Maternal Grandmother, Maternal Aunt    Celiac disease Sister    Diabetes Father    Heart disease Mother, Father, Maternal Grandmother        Tobacco Use    Smoking status: Current Some Day Smoker     Packs/day: 0.50     Years: 28.00     Pack years: 14.00     Types: Cigarettes     Start date: 1990     Last attempt to quit: 2018     Years since quittin.1    Smokeless tobacco: Never Used    Tobacco comment: anxious   Substance and Sexual Activity    Alcohol use: No    Drug use: No    Sexual activity: No     Partners: Male     ROS  Objective:     Vital Signs (Most Recent):  Temp: 97.4 °F (36.3 °C) (19)  Pulse: 84 (19)  Resp: 18 (19)  BP: (!) 176/81 (19)  SpO2: 97 % (19) Vital Signs (24h Range):  Temp:  [97.4 °F (36.3 °C)-98.2 °F (36.8 °C)] 97.4 °F (36.3 °C)  Pulse:  [74-91] 84  Resp:  [16-24] 18  SpO2:  [92 %-97 %] 97 %  BP: (167-215)/() 176/81     Weight: 122 kg (268 lb 15.4 oz)  Height: 5' 4" (162.6 cm)  Body mass index is 46.17 kg/m².    No intake or output data in the 24 hours ending 19    Ortho/SPM Exam     Gen:  No acute distress  CV:  Peripherally well-perfused.  Pulses 2+ bilaterally.  Lungs:  Normal respiratory effort.  Abdomen:  Soft, non-tender, non-distended  Head/Neck:  Normocephalic.  Atraumatic. No TTP, AROM and PROM intact without pain  Neuro:  CN intact without deficit, SILT throughout B/L Upper & Lower Extremities  Pelvis: No TTP, Stable to direct anterior pressure over ASIS.    MSK:  BLE:  - there is chronic edema,  No ttp at the hip knee or ankle  - AROM and PROM of the hip and knee is intact and without pain  -log roll negative   -straight leg test is positive bilaterally  - TA/EHL/Gastroc/FHL assessed in isolation without " deficit  - SILT throughout  - DP and PT palpated  2+  - Capillary Refill <3s        Significant Labs: All pertinent labs within the past 24 hours have been reviewed.    Significant Imaging: I have reviewed and interpreted all pertinent imaging results/findings.   Bilateral knee osteoarthritis

## 2019-02-14 NOTE — PLAN OF CARE
02/14/19 1400   Discharge Assessment   Assessment Type Discharge Planning Assessment   Confirmed/corrected address and phone number on facesheet? Yes   Assessment information obtained from? Patient   Expected Length of Stay (days) 2   Communicated expected length of stay with patient/caregiver yes   Prior to hospitilization cognitive status: Alert/Oriented   Prior to hospitalization functional status: Assistive Equipment   Current cognitive status: Alert/Oriented   Current Functional Status: Needs Assistance   Lives With other (see comments)  (room mate)   Able to Return to Prior Arrangements other (see comments)  (unsure)   Is patient able to care for self after discharge? Unable to determine at this time (comments)   Patient's perception of discharge disposition home health   Readmission Within the Last 30 Days current reason for admission unrelated to previous admission   If yes, most [facility] facility [facility]   Patient currently being followed by outpatient case management? No   Patient currently receives any other outside agency services? No   Equipment Currently Used at Home cane, straight;rollator;grab bar;glucometer;other (see comments)  (BP machine)   Do you have any problems affording any of your prescribed medications? No   Is the patient taking medications as prescribed? yes   Does the patient have transportation home? Yes   Transportation Anticipated family or friend will provide  (x-spouse, Henry King ([phone]))   Dialysis Name and Scheduled days [id number] DEE [facility]AD[facility]   Does the patient receive services at the [facility]? No   Discharge Plan A Home Health   Discharge Plan B Skilled Nursing Facility   Patient/Family in Agreement with Plan yes   Readmission Questionnaire   At the time of your discharge, did someone talk to you about what your health problems were? Yes   At the time of discharge, did someone talk to you about what to watch out for regarding worsening of your  health problem? Yes   At the time of discharge, did someone talk to you about what to do if you experienced worsening of your health problem? Yes   At the time of discharge, did someone talk to you about which medication to take when you left the hospital and which ones to stop taking? Yes   At the time of discharge, did someone talk to you about when and where to follow up with a doctor after you left the hospital? Yes   What do you believe caused you to be sick enough to be re-admitted? maximilian knee pain s/p fall   How often do you need to have someone help you when you read instructions, pamphlets, or other written material from your doctor or pharmacy? Sometimes   Do you have problems taking your medications as prescribed? No   Do you have any problems affording any of  your prescribed medications? No   Do you have problems obtaining/receiving your medications? No   Does the patient have transportation to healthcare appointments? Yes   Living Arrangements house   Does the patient have family/friends to help with healtcare needs after discharge? yes   Does your caregiver provide all the help you need? I don't know     Dx: maximilian knee pain s/p fall  Consult: ortho surg, neph, & PT/OT  Pharm: [facility]  Hosp f/u appt: Dr. Virginia Foote (PCP)

## 2019-02-14 NOTE — HPI
Kylie King is a 63 y.o. female with multiple medical comorbidities, who has ESRD on HD M/W/F due to longstanding uncontrolled DM2 and renovascular HTN presented to the ED with bilateral increasing leg weakness over the past two weeks.  Orthopaedics was consulted for right hip and knee pain.  Patient does state that she has a long history of back pain and lumbar stenosis.  She describes her pain as radiating down the posterior aspect of her hip, thigh and down the back of her leg.  She states that she does have some knee pain occasionally but she has osteoarthritis of the right knee and the sciatica pain is her main source of pain.  Her main complaint currently is the weakness.  She is on dialysis and does not make urine but she has not had any new bowel incontinence.

## 2019-02-14 NOTE — PROGRESS NOTES
Patient transferred from stretcher to MRI bed ,, tele placed and O2 sensor,, placed in MRI,, tolerating well ,, WCTM

## 2019-02-14 NOTE — ASSESSMENT & PLAN NOTE
Kylie King is a 63 y.o. female with osteoarthritis of both knees/ hips and sciatica with significant lumbar stenosis most prominent at L4.    -Patient has a long history of lumbar stenosis and osteoarthritis.  Believe that the patients current increase in weakness is due to her lumbar stenosis and her pain is very typical of sciatica and she has minimal complaints of knee joint or groin pain.  No acute fractures.  Recommend PT for osteoarthritis.  We will obtain x-rays of the lumbar spine to look for any instability.  No emergent surgical intervention required but will discuss with staff for possible future elective surgery.

## 2019-02-14 NOTE — CONSULTS
[facility]  Nephrology  Consult Note    Patient Name: Kylie King  MRN: [id number]  Admission Date: 2/13/2019  Hospital Length of Stay: 0 days  Attending Provider: JIM Lilly MD   Primary Care Physician: Virginia Foote MD  Principal Problem:Lumbar stenosis    Inpatient consult to Nephrology  Consult performed by: LUCIA Saunders  Consult ordered by: JIM Lilly MD  Reason for consult: ESRD Management        Subjective:     HPI: 62 y/o  female with history of ESRD, HTN, T2DM, poor balance due to obesity, age, and neuropathy, lumbar stenosis, and arthritis presented with recent up-tick in her falls (with approx 8 falls over the past few weeks) that began with R-sided sciatica pain and weakness    History obtained from the patient and the medical chart    Nephrology consulted for ESRD management                            Past Medical History:   Diagnosis Date    Anxiety     Arthritis     DDD, Lumbar    Breast cancer 1/2013    left breast- invasive mammary carcinoma, ER/[place] positive, Her2 negative    Carotid artery stenosis 8/13/2018 6/22/2018: Carotid Duplex: SHANELL: Moderate plaquing - 2.0 m/s - <50%. LICA: Moderate plaquing - 1.6 m/s - <50%.    Cataract     Choledocholithiasis     s/p ERCP and stent placement    Chronic obstructive pulmonary disease 6/8/2018    Chronic venous insufficiency     Colon cancer 2001    CRI (chronic renal insufficiency)     one kidney    Dialysis AV fistula malfunction     ESRD (end stage renal disease) on dialysis     Former smoker 6/8/2018    GERD (gastroesophageal reflux disease)     History of acute pancreatitis 2009 2/09 s/p sphincterotomy    History of cerebrovascular accident 6/8/2018    History of colon cancer     s/p sigmoid colectomy    HTN (hypertension), benign     Hypercholesterolemia     Hyperlipidemia     Hypoxemia 6/8/2018    Intracerebral hemorrhage     Kidney stone     left    Lymphedema of  both lower extremities     Obesity     Pancreatitis     Pancreatitis 2008    Physical deconditioning     Pulmonary edema     Sacroiliac joint pain     Spinal stenosis     Spinal stenosis, lumbar     Stroke 12/2012    Tobacco abuse     Type 2 diabetes mellitus with neurological manifestations, controlled     Neuropathy       Past Surgical History:   Procedure Laterality Date    AMPUTATION-TOE, partial Left 11/25/2016    Performed by Jose Delacruz Jr., DPM at [facility] OR 2ND FLR    BIOPSY, LYMPH NODE, SENTINEL Left 2/25/2013    Performed by Dirk Oneil MD at [facility] OR [facility]    BLOCK-NERVE-MEDIAL BRANCH-LUMBAR Bilateral 3/1/2016    Performed by Stacie Negrete MD at [facility] [place], operated by [facility]    BLOCK-NERVE-MEDIAL BRANCH-LUMBAR Bilateral 2/23/2016    [facility] at [facility]    BREAST BIOPSY  1/2012    left breast invasive mammary carcinoma (lateral bx) and intraductal papilloma (medial bx)    BREAST BIOPSY  1999    rt breast FA    CHOLECYSTECTOMY  2001    COLON SURGERY      CREATION -FISTULA-AV Right 5/28/2018    Performed by RICK Pollard III, MD at [facility] OR 2ND [facility]    EXTRACTION, CATARACT, WITH IOL INSERTION Left 1/31/2019    Performed by Immanuel Moncada MD at [facility] [place]    EXTRACTION, CATARACT, WITH IOL INSERTION Right 1/24/2019    Performed by Immanuel Moncada MD at [facility] [place]    HERNIA REPAIR      INJECTION, FOR SENTINEL LYMPH NODE IDENTIFICATION Left 2/25/2013    Performed by Dirk Oneil MD at [facility] OR 2ND [facility]LR    INJECTION-JOINT Bilateral 1/20/2016    Performed by Stacie Negrete MD at [facility] [place], operated by [facility]    INSERTION, TISSUE EXPANDER, BREAST Left 2/25/2013    Performed by Terry Moreno MD at [facility] OR 2ND [facility]    INSERTION-CATHETER-DIALYSIS Right 4/23/2018    Performed by Jenae Salguero DO at [facility]    INSERTION-CATHETER-BRENNEN CATH - C-ARM N/A 11/11/2016    Performed by Bobby Blount Jr., MD at [facility] [place]    left nephrectomy      atrophic kidney and  hydronephrosis    left oopherectomy  2001    MASTECTOMY  2013    left    MASTECTOMY Left 2/25/2013    Performed by Dirk Oneil MD at [facility] 2ND [facility]    MASTOPEXY Right 2/25/2013    Performed by Terry Moreno MD at [facility] OR 2ND [facility]    NEPHRECTOMY  2011    lap    RADIOFREQUENCY THERMOCOAGULATION (RFTC)-NERVE-MEDIAN BRANCH-LUMBAR Left 3/30/2016    Performed by Stacie Negrete MD at [facility]    RADIOFREQUENCY THERMOCOAGULATION (RFTC)-NERVE-MEDIAN BRANCH-LUMBAR Right 3/16/2016    Performed by Stacie Negrete MD at [facility]    RECONSTRUCTION, BREAST, USING LATISSIMUS DORSI MYOCUTANEOUS FLAP Left 4/11/2013    Performed by Terry Moreno MD at [facility] OR [facility]    RECONSTRUCTION-NIPPLE Left 1/9/2014    Performed by Terry Moreno MD at [facility]    REPAIR, HERNIA, VENTRAL, LAPAROSCOPIC N/A 3/28/2014    Performed by Min Polanco MD at [facility] OR 2ND [facility]LR    REVISION Bilateral 1/9/2014    Performed by Terry Moreno MD at [facility] OR 2ND [facility]    REVISION, AV FISTULA Right 8/15/2018    [facility]    SIGMOIDECTOMY  2001    TOTAL REDUCTION MAMMOPLASTY Right 2013    TRANSPOSITION, VEIN Right 8/15/2018    Performed by RICK Pollard III, MD at [facility] OR [facility]       Review of patient's allergies indicates:   Allergen Reactions    Cyclobenzaprine Hallucinations    Erythromycin      Stomach upset    Keflex [cephalexin]      Yeast infection     Current Facility-Administered Medications   Medication Frequency    0.9%  NaCl infusion PRN    acetaminophen tablet 500 mg Q6H PRN    acetaminophen-codeine 300-30mg per tablet 1 tablet Q8H PRN    albuterol-ipratropium 2.5 mg-0.5 mg/3 mL nebulizer solution 3 mL Q4H PRN    amLODIPine tablet 10 mg QAM    atorvastatin tablet 40 mg Daily    calcium acetate capsule 667 mg TID WM    carvedilol tablet 25 mg BID    citalopram tablet 20 mg Nightly    dextrose 50%  injection 12.5 g PRN    dextrose 50% injection 25 g PRN    ergocalciferol capsule 50,000 Units Q7 Days    fluticasone 50 mcg/actuation nasal spray 100 mcg Daily    gabapentin capsule 400 mg QHS    glucagon (human recombinant) injection 1 mg PRN    glucose chewable tablet 16 g PRN    glucose chewable tablet 24 g PRN    heparin (porcine) injection 5,000 Units Q8H    hydrALAZINE tablet 25 mg BID    insulin aspart U-100 pen 0-5 Units QID (AC + HS) PRN    insulin aspart U-100 pen 9 Units TIDWM    insulin detemir U-100 pen 45 Units BID    letrozole tablet 2.5 mg Nightly    omega-3 fatty acids-fish oil 340-1,000 mg capsule 1 capsule BID    ondansetron disintegrating tablet 8 mg Q8H PRN    orphenadrine 12 hr tablet 100 mg BID PRN    promethazine tablet 12.5 mg Q6H PRN    ramelteon tablet 8 mg Nightly PRN    senna-docusate 8.6-50 mg per tablet 1 tablet BID    sodium chloride 0.9% flush 5 mL PRN     Facility-Administered Medications Ordered in Other Encounters   Medication Frequency    mupirocin 2 % ointment On Call Procedure     Family History     Problem Relation (Age of Onset)    Arthritis Mother    Breast cancer Maternal Grandmother    Cancer Maternal Grandmother, Maternal Aunt    Celiac disease Sister    Diabetes Father    Heart disease Mother, Father, Maternal Grandmother        Tobacco Use    Smoking status: Current Some Day Smoker     Packs/day: 0.50     Years: 28.00     Pack years: 14.00     Types: Cigarettes     Start date: 1990     Last attempt to quit: 2018     Years since quittin.1    Smokeless tobacco: Never Used    Tobacco comment: anxious   Substance and Sexual Activity    Alcohol use: No    Drug use: No    Sexual activity: No     Partners: Male     Review of Systems   HENT: Negative.    Eyes: Negative.    Respiratory: Negative.    Cardiovascular: Negative.    Gastrointestinal: Negative.    Endocrine: Negative.    Genitourinary: Negative.    Musculoskeletal: Negative.   "  Skin: Negative.    Allergic/Immunologic: Negative.    Neurological: Positive for tremors and weakness.        Patient reports fell "yesterday morning"   Hematological: Bruises/bleeds easily.   Psychiatric/Behavioral: Negative.      Objective:     Vital Signs (Most Recent):  Temp: 98.7 °F (37.1 °C) (02/14/19 0801)  Pulse: 70 (02/14/19 0816)  Resp: 20 (02/14/19 0706)  BP: 128/65 (02/14/19 0706)  SpO2: (!) 94 % (02/13/19 2251)  O2 Device (Oxygen Therapy): nasal cannula (02/14/19 0801) Vital Signs (24h Range):  Temp:  [97.4 °F (36.3 °C)-98.7 °F (37.1 °C)] 98.7 °F (37.1 °C)  Pulse:  [62-91] 70  Resp:  [16-24] 20  SpO2:  [92 %-97 %] 94 %  BP: (114-215)/() 128/65     Weight: 122 kg (268 lb 15.4 oz) (02/13/19 1843)  Body mass index is 46.17 kg/m².  Body surface area is 2.35 meters squared.    No intake/output data recorded.    Physical Exam   Constitutional: She is oriented to person, place, and time. She appears well-developed and well-nourished.   HENT:   Head: Normocephalic and atraumatic.   Eyes: Conjunctivae and EOM are normal. Pupils are equal, round, and reactive to light.   Neck: Normal range of motion. Neck supple.   Cardiovascular: Normal rate, regular rhythm, normal heart sounds and intact distal pulses.   Pulmonary/Chest: Effort normal and breath sounds normal.   Abdominal: Soft. Bowel sounds are normal.   Musculoskeletal: She exhibits edema.   Bilateral LE edema. Decreased ROM to LEs   Neurological: She is alert and oriented to person, place, and time.   tremors   Skin: Skin is warm and dry. Capillary refill takes less than 2 seconds.   AVF to R lower arm with + thrill/+ bruit. PIV to L hand. Ecchymosis to hands. Ulcers to lower legs; also at toes to feet; malodorous; skin pink (pt reports a history of cellulitis and states was f/b Wound Care)   Psychiatric: She has a normal mood and affect. Her behavior is normal. Judgment and thought content normal.   Nursing note and vitals reviewed.      Significant " Labs:  All labs within the past 24 hours have been reviewed.    Significant Imaging:  Labs: Reviewed  ECG: Reviewed  Echo: Reviewed    Assessment/Plan:     * Lumbar stenosis    -Managed by Hospital Medicine and Orthopedic Surgery     Occasional tremors    Discussed with Hospital Medicine     ESRD (end stage renal disease) on dialysis    ESRD on HD for metabolic clearance and fluid removal  -Maintain MWF HD schedule while inpatient  -Obtain OP HD medical record  -Daily CBC, renal function panel  -Renal diet  -1 liter fluid restrictions per day  -Daily weights and chart  -Strict I/O and chart  -Medications to renal parameter    Dialysis Information: iHD  -Out patient HD unit: [facility]  -Nephrologist: Dr. Siegel  -HD tx days: MWF  -HD tx time: 4 hours 30 minutes  -HD access: AVF   -Last OP HD: Friday, Feb 8th  -Last IP HD: Today, Feb 14th  -EDW: 118 kg  -RRF: oliguric    Hep B sAg negative 2/14/19    Anemia: Hb > 7 gm/dL; hold epogen  MBD: start OP Ca acetate dose TID WM    HTN  - Maintain MAP >65  - Keep target Bp <140/90         Thank you for your consult. I will follow-up with patient. Please contact us if you have any additional questions.    Laura Logan, LUCIA  Nephrology  [facility]

## 2019-02-14 NOTE — PROGRESS NOTES
Hemodialysis    3-hour HD completed, patient tolerated well, rinsed back.  Hemostasis achieved within 15 mins.  Right AV fistula with bruit and thrill, gauzed and taped.    NET UF REMOVED: 3 liters. Report given to primary nurse

## 2019-02-15 LAB
ALBUMIN SERPL BCP-MCNC: 3.2 G/DL
ANION GAP SERPL CALC-SCNC: 15 MMOL/L
BASOPHILS # BLD AUTO: 0.03 K/UL
BASOPHILS NFR BLD: 0.7 %
BUN SERPL-MCNC: 61 MG/DL
CALCIUM SERPL-MCNC: 8.3 MG/DL
CHLORIDE SERPL-SCNC: 99 MMOL/L
CO2 SERPL-SCNC: 25 MMOL/L
CREAT SERPL-MCNC: 10.5 MG/DL
DIFFERENTIAL METHOD: ABNORMAL
EOSINOPHIL # BLD AUTO: 0.1 K/UL
EOSINOPHIL NFR BLD: 2.6 %
ERYTHROCYTE [DISTWIDTH] IN BLOOD BY AUTOMATED COUNT: 17.3 %
EST. GFR  (AFRICAN AMERICAN): 4 ML/MIN/1.73 M^2
EST. GFR  (NON AFRICAN AMERICAN): 3.5 ML/MIN/1.73 M^2
FERRITIN SERPL-MCNC: 749 NG/ML
FOLATE SERPL-MCNC: 3.7 NG/ML
GLUCOSE SERPL-MCNC: 51 MG/DL
HCT VFR BLD AUTO: 26 %
HGB BLD-MCNC: 7.8 G/DL
IMM GRANULOCYTES # BLD AUTO: 0.01 K/UL
IMM GRANULOCYTES NFR BLD AUTO: 0.2 %
IRON SERPL-MCNC: 78 UG/DL
LYMPHOCYTES # BLD AUTO: 1.6 K/UL
LYMPHOCYTES NFR BLD: 39.4 %
MAGNESIUM SERPL-MCNC: 2 MG/DL
MCH RBC QN AUTO: 28.8 PG
MCHC RBC AUTO-ENTMCNC: 30 G/DL
MCV RBC AUTO: 96 FL
MONOCYTES # BLD AUTO: 0.5 K/UL
MONOCYTES NFR BLD: 10.8 %
NEUTROPHILS # BLD AUTO: 1.9 K/UL
NEUTROPHILS NFR BLD: 46.3 %
NRBC BLD-RTO: 0 /100 WBC
PHOSPHATE SERPL-MCNC: 7.2 MG/DL
PLATELET # BLD AUTO: 181 K/UL
PMV BLD AUTO: 10.8 FL
POCT GLUCOSE: 146 MG/DL (ref 70–110)
POCT GLUCOSE: 63 MG/DL (ref 70–110)
POCT GLUCOSE: 90 MG/DL (ref 70–110)
POCT GLUCOSE: 99 MG/DL (ref 70–110)
POTASSIUM SERPL-SCNC: 4.5 MMOL/L
RBC # BLD AUTO: 2.71 M/UL
SATURATED IRON: 33 %
SODIUM SERPL-SCNC: 139 MMOL/L
TOTAL IRON BINDING CAPACITY: 235 UG/DL
TRANSFERRIN SERPL-MCNC: 159 MG/DL
WBC # BLD AUTO: 4.16 K/UL

## 2019-02-15 PROCEDURE — G0257 UNSCHED DIALYSIS ESRD PT HOS: HCPCS

## 2019-02-15 PROCEDURE — 83735 ASSAY OF MAGNESIUM: CPT

## 2019-02-15 PROCEDURE — 25000003 PHARM REV CODE 250: Performed by: PHYSICIAN ASSISTANT

## 2019-02-15 PROCEDURE — 99225 PR SUBSEQUENT OBSERVATION CARE,LEVEL II: ICD-10-PCS | Mod: ,,, | Performed by: PHYSICIAN ASSISTANT

## 2019-02-15 PROCEDURE — 25000003 PHARM REV CODE 250: Performed by: NURSE PRACTITIONER

## 2019-02-15 PROCEDURE — 90935 PR HEMODIALYSIS, ONE EVALUATION: ICD-10-PCS | Mod: ,,, | Performed by: NURSE PRACTITIONER

## 2019-02-15 PROCEDURE — 82728 ASSAY OF FERRITIN: CPT

## 2019-02-15 PROCEDURE — 82040 ASSAY OF SERUM ALBUMIN: CPT

## 2019-02-15 PROCEDURE — 25000003 PHARM REV CODE 250: Performed by: INTERNAL MEDICINE

## 2019-02-15 PROCEDURE — 97116 GAIT TRAINING THERAPY: CPT

## 2019-02-15 PROCEDURE — 97110 THERAPEUTIC EXERCISES: CPT

## 2019-02-15 PROCEDURE — 82746 ASSAY OF FOLIC ACID SERUM: CPT

## 2019-02-15 PROCEDURE — 83921 ORGANIC ACID SINGLE QUANT: CPT

## 2019-02-15 PROCEDURE — 83540 ASSAY OF IRON: CPT

## 2019-02-15 PROCEDURE — 36415 COLL VENOUS BLD VENIPUNCTURE: CPT

## 2019-02-15 PROCEDURE — 84100 ASSAY OF PHOSPHORUS: CPT

## 2019-02-15 PROCEDURE — 63600175 PHARM REV CODE 636 W HCPCS: Performed by: INTERNAL MEDICINE

## 2019-02-15 PROCEDURE — 85025 COMPLETE CBC W/AUTO DIFF WBC: CPT

## 2019-02-15 PROCEDURE — G0378 HOSPITAL OBSERVATION PER HR: HCPCS

## 2019-02-15 PROCEDURE — 90935 HEMODIALYSIS ONE EVALUATION: CPT | Mod: ,,, | Performed by: NURSE PRACTITIONER

## 2019-02-15 PROCEDURE — 97166 OT EVAL MOD COMPLEX 45 MIN: CPT

## 2019-02-15 PROCEDURE — 80048 BASIC METABOLIC PNL TOTAL CA: CPT

## 2019-02-15 PROCEDURE — 99225 PR SUBSEQUENT OBSERVATION CARE,LEVEL II: CPT | Mod: ,,, | Performed by: PHYSICIAN ASSISTANT

## 2019-02-15 PROCEDURE — 97530 THERAPEUTIC ACTIVITIES: CPT

## 2019-02-15 RX ORDER — CYANOCOBALAMIN 1000 UG/ML
1000 INJECTION, SOLUTION INTRAMUSCULAR; SUBCUTANEOUS ONCE
Status: DISCONTINUED | OUTPATIENT
Start: 2019-02-15 | End: 2019-02-20 | Stop reason: HOSPADM

## 2019-02-15 RX ORDER — IRON POLYSACCHARIDE COMPLEX 150 MG
150 CAPSULE ORAL DAILY
Status: DISCONTINUED | OUTPATIENT
Start: 2019-02-15 | End: 2019-02-20 | Stop reason: HOSPADM

## 2019-02-15 RX ORDER — FOLIC ACID 1 MG/1
1 TABLET ORAL DAILY
Status: DISCONTINUED | OUTPATIENT
Start: 2019-02-15 | End: 2019-02-20 | Stop reason: HOSPADM

## 2019-02-15 RX ORDER — INSULIN ASPART 100 [IU]/ML
4 INJECTION, SOLUTION INTRAVENOUS; SUBCUTANEOUS
Status: DISCONTINUED | OUTPATIENT
Start: 2019-02-15 | End: 2019-02-20 | Stop reason: HOSPADM

## 2019-02-15 RX ADMIN — INSULIN DETEMIR 45 UNITS: 100 INJECTION, SOLUTION SUBCUTANEOUS at 08:02

## 2019-02-15 RX ADMIN — HYDRALAZINE HYDROCHLORIDE 25 MG: 25 TABLET, FILM COATED ORAL at 08:02

## 2019-02-15 RX ADMIN — CARVEDILOL 25 MG: 25 TABLET, FILM COATED ORAL at 08:02

## 2019-02-15 RX ADMIN — ACETAMINOPHEN 500 MG: 500 TABLET ORAL at 04:02

## 2019-02-15 RX ADMIN — HEPARIN SODIUM 5000 UNITS: 5000 INJECTION, SOLUTION INTRAVENOUS; SUBCUTANEOUS at 10:02

## 2019-02-15 RX ADMIN — HEPARIN SODIUM 5000 UNITS: 5000 INJECTION, SOLUTION INTRAVENOUS; SUBCUTANEOUS at 06:02

## 2019-02-15 RX ADMIN — HYDRALAZINE HYDROCHLORIDE 25 MG: 25 TABLET, FILM COATED ORAL at 10:02

## 2019-02-15 RX ADMIN — CALCIUM ACETATE 667 MG: 667 CAPSULE ORAL at 04:02

## 2019-02-15 RX ADMIN — ATORVASTATIN CALCIUM 40 MG: 20 TABLET, FILM COATED ORAL at 08:02

## 2019-02-15 RX ADMIN — STANDARDIZED SENNA CONCENTRATE AND DOCUSATE SODIUM 1 TABLET: 8.6; 5 TABLET, FILM COATED ORAL at 10:02

## 2019-02-15 RX ADMIN — CALCIUM ACETATE 667 MG: 667 CAPSULE ORAL at 08:02

## 2019-02-15 RX ADMIN — STANDARDIZED SENNA CONCENTRATE AND DOCUSATE SODIUM 1 TABLET: 8.6; 5 TABLET, FILM COATED ORAL at 08:02

## 2019-02-15 RX ADMIN — FOLIC ACID 1 MG: 1 TABLET ORAL at 04:02

## 2019-02-15 RX ADMIN — INSULIN DETEMIR 30 UNITS: 100 INJECTION, SOLUTION SUBCUTANEOUS at 10:02

## 2019-02-15 RX ADMIN — HEPARIN SODIUM 5000 UNITS: 5000 INJECTION, SOLUTION INTRAVENOUS; SUBCUTANEOUS at 04:02

## 2019-02-15 RX ADMIN — GABAPENTIN 400 MG: 100 CAPSULE ORAL at 10:02

## 2019-02-15 RX ADMIN — ACETAMINOPHEN AND CODEINE PHOSPHATE 1 TABLET: 300; 30 TABLET ORAL at 12:02

## 2019-02-15 RX ADMIN — HYDROCODONE BITARTRATE AND ACETAMINOPHEN 1 TABLET: 5; 325 TABLET ORAL at 08:02

## 2019-02-15 RX ADMIN — Medication 1 CAPSULE: at 08:02

## 2019-02-15 RX ADMIN — SODIUM CHLORIDE: 0.9 INJECTION, SOLUTION INTRAVENOUS at 08:02

## 2019-02-15 RX ADMIN — CARVEDILOL 25 MG: 25 TABLET, FILM COATED ORAL at 10:02

## 2019-02-15 RX ADMIN — AMLODIPINE BESYLATE 10 MG: 10 TABLET ORAL at 06:02

## 2019-02-15 RX ADMIN — CITALOPRAM HYDROBROMIDE 20 MG: 20 TABLET ORAL at 10:02

## 2019-02-15 NOTE — PLAN OF CARE
SW informed by the team that the pt is in need of rehab placement.  SW sent a referral to [facility] for consideration.   SW will f/u on this referral.            Jigna He, MSW, LCSW  [facility]  [id number]

## 2019-02-15 NOTE — ASSESSMENT & PLAN NOTE
-atorvastatin tablet 40 mg, 40 mg, Oral, Daily  -omega-3 fatty acids-fish oil 340-1,000 mg capsule 1 capsule, 1 capsule, Oral, BID

## 2019-02-15 NOTE — ASSESSMENT & PLAN NOTE
R sided sciatica  -Check MRI pelvis and lumbo sacral area   Lumbar MRI shows mod-sev spinal canal stenosis without evidence of fx/dislocations   - pt unable to complete pelvic MRI 2/2 anxiety/ pain  -orphenadrine 12 hr tablet 100 mg, 100 mg, Oral, BID PRN spasm of muscles    No surgical intervention warranted at this time per ortho, recommend Dr. Olivia from ortho spine to evaluate pt

## 2019-02-15 NOTE — ASSESSMENT & PLAN NOTE
Anemia in ESRD    -Nephrology consult  -Pt dialyzed overnight, labs much improved this AM  -calcium acetate capsule 667 mg, 667 mg, Oral, TID WM  -ergocalciferol capsule 50,000 Units, 50,000 Units, Oral, Q7 Days  - Defer for Procrit to Nephro  - B12, Fe studies, folate ordered

## 2019-02-15 NOTE — PROGRESS NOTES
HEMODIALYSIS NOTE  Patient evaluated while undergoing hemodialysis indicated for ESRD. Tolerating session with current UFR. Labs reviewed and dialysate bath adjusted. Hypotensive on HD; pt received BP meds prior to HD. UF goal 2L as tolerated.  to AVF.    -Hep B sAg negative 2/14/19  -hold epogen  -ferritin 749/iron Sat 33  -per pt's OP HD unit, pt has been on venofer 50 mg IV weekly  -per pt's OP HD unit, pt last received venofer dose last Wed  -resume OP HD venofer dose  -continue Ca acetate  -albumin  -hold BP meds before HD

## 2019-02-15 NOTE — HOSPITAL COURSE
Pt admitted for L sided leg pain that has been so severe she has had multiple falls and missed her HD appointments. MRI brain shows no acute process to attribute frequent falls. Knee and hip xray show no acute fracture or dislocation. MRI lumbar shows mod/severe spinal canal stenosis. Pt not able to complete MRI pelvis secondary to pain/anxiety. Ortho consulted, no acute surgical intervention necessary per ortho/orthospine. PT evaluated pt, recommending rehab. Pt approved but awaiting insurance authorization (incorrect insurance on file, resubmitted with correct insurance 2/18). In the interim, pt developed new onset fever (101.1) with persistent cough since admission. CXR with no acute pulmonary processes. Procal mildly elevated (0.33). Rapid flu negative. Resp viral panel showed coronavirus, blood cultures no growth. Tamiflu initiated given symptoms, responded well. Patient afebrile and stable for discharge to rehab.

## 2019-02-15 NOTE — PT/OT/SLP PROGRESS
Physical Therapy Treatment    Patient Name:  Kylie King   MRN:  [id number]    Recommendations:     Discharge Recommendations:  rehabilitation facility   Discharge Equipment Recommendations: (TBD)   Barriers to discharge: Inaccessible home and Decreased caregiver support    Assessment:     Kylie King is a 63 y.o. female admitted with a medical diagnosis of Frequent falls.  She presents with the following impairments/functional limitations:  weakness, impaired endurance, impaired self care skills, impaired functional mobilty, decreased coordination, gait instability, decreased upper extremity function, decreased lower extremity function, impaired balance Patient tolerated tx  well. Patient limited at this time by increased instability with gait trial. Pt improving with sit to stand and was able to stand with mod A with RW. Mod A to walk x 3-4 steps with RW before becoming too weak and having to return to sitting.   Patient will continue to require skilled PT services to address the above impairments to return to prior level of function as independent as possible. Patient would benefit and can tolerate an inpatient rehabilitation therapy stay. Patient requires an inpatient rehabilitation  stay  to address the impairments listed above in a multidisciplinary, therapy intense setting. Skilled, intensive  therapy is needed  to facilitate patients return to prior level of function as independent as possible, decrease caregiver burden and reduce risk of falls.     .    Rehab Prognosis: Good; patient would benefit from acute skilled PT services to address these deficits and reach maximum level of function.    Recent Surgery: * No surgery found *      Plan:     During this hospitalization, patient to be seen 4 x/week to address the identified rehab impairments via gait training, therapeutic activities, therapeutic exercises, neuromuscular re-education and progress toward the following goals:    · Plan of Care  "Expires:  03/14/19    Subjective     Chief Complaint: jerking/shakingmovement in LLE  Patient/Family Comments/goals: pt states she does not like the idea of rehab but understands why it would benefit her  " I dont think I can get up my steps"  Pain/Comfort:  · Pain Rating 1: 0/10  · Pain Rating Post-Intervention 1: 0/10  · Pain Rating Post-Intervention 2: 0/10      Objective:     Communicated with RN  prior to session.  Patient found supine with  (no active lines)  upon PT entry to room.     General Precautions: Standard, fall   Orthopedic Precautions:N/A   Braces: N/A     Functional Mobility:  · Bed Mobility:  Rolling Right: minimum assistance  · Supine to Sit: minimum assistance  · Sit to Supine: minimum assistance  · Transfers:  Sit to Stand:  moderate assistance with rolling walker  · Gait: x 3-5 steps fwd/bwd, 3-4 steps laterally ( R) with mod A with RW for WS and RW mgmt  · Balance: SBA for EOB balance; mod A for static/dynamic standing balance      AM-PAC 6 CLICK MOBILITY  Turning over in bed (including adjusting bedclothes, sheets and blankets)?: 3  Sitting down on and standing up from a chair with arms (e.g., wheelchair, bedside commode, etc.): 2  Moving from lying on back to sitting on the side of the bed?: 3  Moving to and from a bed to a chair (including a wheelchair)?: 2  Need to walk in hospital room?: 2  Climbing 3-5 steps with a railing?: 1  Basic Mobility Total Score: 13       Therapeutic Activities and Exercises:  LE TE in sitting  AP, marching LAQ x 20 each        Patient education  · Patient educated on the role of PT and POC  · Patient educated on importance  activity while in the hosptial per tolerance for improved endurance and to limit deconditioning   · Patient educated on safe transfers with nursing as appropriate  · Patient educated on energy conservation, pursed lip breathing  · Patient educated on proper transfer mechanics and safety  · All of patients questions were answered within the " scope of PT        Patient left HOB elevated with all lines intact and call button in reach..    GOALS:   Multidisciplinary Problems     Physical Therapy Goals        Problem: Physical Therapy Goal    Goal Priority Disciplines Outcome Goal Variances Interventions   Physical Therapy Goal     PT, PT/OT Ongoing (interventions implemented as appropriate)     Description:  Goals to be met by: 19     Patient will increase functional independence with mobility by performin. Supine to sit with Contact Guard Assistance  2. Sit to supine with Contact Guard Assistance  3. Sit to stand transfer with Moderate Assistance  4. Bed to chair transfer with Moderate Assistance using Rolling Walker or LRD.   5. Gait  x 10 feet with Maximum Assistance using Rolling Walker or LRD.   6. Ascend/descend 5 stair with bilateral Handrails Maximum Assistance using Rolling Walker or LRD.   7. Stand for 2 minutes with Moderate Assistance using Rolling Walker or LRD.                       Time Tracking:     PT Received On: 02/15/19  PT Start Time: 1436     PT Stop Time: 1508  PT Total Time (min): 32 min     Billable Minutes: Gait Training 8 min and Therapeutic Exercise 10 min    Treatment Type: Treatment  PT/PTA: PT     PTA Visit Number: 0     Iván Petersen, PT  02/15/2019

## 2019-02-15 NOTE — HPI
Kylie King is a 63-year-old female with PMHx of HTN, DM2, ESRD (on HD), CAD, Breast CA, gallstones (s/p previous stent placement),  Pancreatitis, ICH, kidney stones, lymphedema of BLE, pulmonary edema, lumbar stenosis, & OA  Patient presented to [facility] on 2/13 with frequent falls with RLE pain/sciatica symptoms (8 falls over the past few weeks). MRI brain without acute pathology, revealed remote pontine hemorrhage and remote microhemorrhages within the right thalamus and bilateral basal ganglia. Bilateral knee and hip xrays and revealed significant joint space narrowing. MRI L-spine with significant degenerative change of the lumbar spine most pronounced at the L4-L5 where there is moderate/severe spinal canal stenosis and mild bilateral neural foraminal narrowing. Unable to complete MRI pelvis 2/2 anxiety.  No emergent surgical intervention required but future elective surgery may be pending. Brace to B/l knees. Hospital course complicated by HTN.     Functional History: Patient lives in [place] alone in a single story home with 5 steps to enter.  Prior to admission, (I) with ADLs and mobility with WC in house and electric scooter in community. DME: none.

## 2019-02-15 NOTE — ASSESSMENT & PLAN NOTE
-amLODIPine tablet 10 mg, 10 mg, Oral, QAM  -carvedilol tablet 25 mg, 25 mg, Oral, BID  -hydrALAZINE tablet 25 mg, 25 mg, Oral, BID    BP stable and at goal throughout day

## 2019-02-15 NOTE — HOSPITAL COURSE
02/14/2019: Evaluated by PT.  Bed mobility Mod-MaxA.  Sit to stand MaxA with RW. Stretcher MaxA x 2 ppl.   02/15/2019: Evaluated by OT.  Bed mobility Rosario.  Sit to stand ModA & RW.  Ambulated ~6-10 steps ModA & RW.  UBD Rosario.

## 2019-02-15 NOTE — PROGRESS NOTES
"[facility] Hospital Medicine  Progress Note    Patient Name: Kylie King  MRN: [id number]  Patient Class: OP- Observation   Admission Date: 2/13/2019  Length of Stay: 0 days  Attending Physician: JIM Lilly MD  Primary Care Provider: Virginia Foote MD    [facility] Medicine Team: [facility] HOSP MED E Clarita Mooeny PA-C    Subjective:     Principal Problem:Frequent falls    HPI:  Ms. King is a 64yo lady with multiple past medical problems (see below), who has ESRD on HD M/W/F due to longstanding uncontrolled DM2 and renovascular HTN.  She has always had "bad balance" due to her obesity, age and neuropathy, but over the past year she has noticed an up-tick in her falls.  Over the past 4 months this has almost doubled, and now over the past few weeks she has fallen 8 times.  Her problems first started with symptoms of right sided sciatica pain (sharp pain radiating down the back of her leg) with weakness.  This led to her guarding the right side with overuse of her left leg.  After a week or so of this and still falling, she suddenly noticed that her left leg would, "just suddenly give out" on her.  She would go to make a step and on her left foot touching, it was, "like the leg just didn't have any strength and down I'd go"  Her right knee is extremely painful to any pressure or movement and still with some sciatica symptoms.  She is now having difficulty ambulating.  She has some right buttock pain as well, but no harry lumbar back pain.  She did see her PCP and an Orthopedist for these symptoms.  She was given a Rx for Norflex, which did seem to assist some with the pain, but actually made her balance significantly worse, so she stopped it after 3 days.     Due to the poor balance and fear of using the steps, she missed her HD session on Monday.  She was planning to go today, but again fell and struck her knees and her right lower ribcage.  She feels, "a little knot there now."  To " complicate things, she also recently had cataract surgery (1/30/19), so this is impeding her vision to some degree (along with the dark sunglasses).    Hospital Course:  Pt admitted for L sided leg pain that has been so severe she has had multiple falls and missed her HD appointments. MRI brain shows no acute process to attribute frequent falls. Knee and hip xray show no acute fracture or dislocation. MRI lumbar shows mod/severe spinal canal stenosis. Pt not able to complete MRI pelvis secondary to pain/anxiety. Ortho consulted, no acute surgical intervention necessary. PT evaluated pt, recommending rehab. PMR consulted.    Interval History: Pt received dialysis late last night, labs improved this AM. Pt states pain is same as yesterday. Discussed plan of care.    Review of Systems   Constitutional: Negative for chills and fever.   HENT: Negative for congestion.    Eyes: Positive for visual disturbance.        Blurred vision - pt with recent cataract surgery   Respiratory: Negative for cough and shortness of breath.    Cardiovascular: Negative for chest pain and palpitations.   Gastrointestinal: Negative for abdominal pain and vomiting.   Genitourinary: Negative for difficulty urinating and dysuria.   Musculoskeletal: Positive for arthralgias, back pain and gait problem. Negative for neck pain and neck stiffness.   Skin: Negative for color change.   Neurological: Positive for weakness (generalized). Negative for dizziness and syncope.   Psychiatric/Behavioral: Negative for agitation and confusion.     Objective:     Vital Signs (Most Recent):  Temp: 99.4 °F (37.4 °C) (02/14/19 1556)  Pulse: 77 (02/14/19 1620)  Resp: 20 (02/14/19 1556)  BP: 134/63 (02/14/19 1556)  SpO2: (!) 93 % (02/14/19 1556) Vital Signs (24h Range):  Temp:  [97.4 °F (36.3 °C)-99.5 °F (37.5 °C)] 99.4 °F (37.4 °C)  Pulse:  [62-85] 77  Resp:  [18-20] 20  SpO2:  [93 %-98 %] 93 %  BP: (114-176)/(59-84) 134/63     Weight: 122 kg (268 lb 15.4 oz)  Body  mass index is 46.17 kg/m².    Intake/Output Summary (Last 24 hours) at 2/14/2019 1944  Last data filed at 2/14/2019 1733  Gross per 24 hour   Intake 875 ml   Output 3300 ml   Net -2425 ml      Physical Exam   Constitutional: She is oriented to person, place, and time. She appears well-developed and well-nourished. No distress.   HENT:   Head: Normocephalic and atraumatic.   Eyes: Conjunctivae are normal. Right eye exhibits no discharge. Left eye exhibits no discharge.   Neck: Normal range of motion.   Cardiovascular: Normal rate and regular rhythm.   Pulmonary/Chest: Effort normal and breath sounds normal. No respiratory distress. She has no wheezes.   Abdominal: Soft. Bowel sounds are normal. There is no tenderness.   Musculoskeletal: She exhibits edema (chronic venous stasis).   Limited range of motion in BLE, more so on R side than L   Neurological: She is alert and oriented to person, place, and time.   Skin: She is not diaphoretic.   Psychiatric: She has a normal mood and affect. Her behavior is normal.       Significant Labs: All pertinent labs within the past 24 hours have been reviewed.    Significant Imaging: I have reviewed all pertinent imaging results/findings within the past 24 hours.    Assessment/Plan:      * Frequent falls    -Check Vitamin D, B12, Folate  -MRI brain reveals no central cause  -Suspect multifactorial from:              -vision issues, medications, age, weight, neuropathy, sciatica and severe DJD knees/hips  -PT and OT consults; PT rec rehab; PMR consulted  -Avoid benzo's and other psychotropic meds  -MRI of L spine as per below      Lumbar stenosis    R sided sciatica  -Check MRI pelvis and lumbo sacral area   Lumbar MRI shows mod-sev spinal canal stenosis without evidence of fx/dislocations   - pt unable to complete pelvic MRI 2/2 anxiety/ pain  -orphenadrine 12 hr tablet 100 mg, 100 mg, Oral, BID PRN spasm of muscles    No surgical intervention warranted at this time per ortho,  recommend Dr. Olivia from ortho spine to evaluate pt       Acute pain of both knees    -Ortho consult  -Local cooling pack  -Brace as per PT or ortho     ESRD (end stage renal disease) on dialysis    Anemia in ESRD    -Nephrology consult  -Pt dialyzed overnight, labs much improved this AM  -calcium acetate capsule 667 mg, 667 mg, Oral, TID WM  -ergocalciferol capsule 50,000 Units, 50,000 Units, Oral, Q7 Days  - Defer for Procrit to Nephro  - B12, Fe studies, folate ordered       Hyperkalemia    -HD orders written already  -Telemetry    K WNL after dialysis     DM type 2, uncontrolled, with neuropathy    -gabapentin capsule 400 mg, 400 mg, Oral, QHS  -insulin detemir U-100 pen 45 Units, 45 Units, Subcutaneous, BID  -Asp 9 units SQ AC with meals TID + SSI protocol    BG stable, cont to monitor     Secondary renovascular hypertension, malignant    -amLODIPine tablet 10 mg, 10 mg, Oral, QAM  -carvedilol tablet 25 mg, 25 mg, Oral, BID  -hydrALAZINE tablet 25 mg, 25 mg, Oral, BID    BP stable and at goal throughout day       Hypercholesterolemia    -atorvastatin tablet 40 mg, 40 mg, Oral, Daily  -omega-3 fatty acids-fish oil 340-1,000 mg capsule 1 capsule, 1 capsule, Oral, BID       Depression    -citalopram tablet 20 mg, 20 mg, Oral, Nightly         History of breast cancer    -letrozole tablet 2.5 mg, 2.5 mg, Oral, Nightly         VTE Risk Mitigation (From admission, onward)        Ordered     heparin (porcine) injection 5,000 Units  Every 8 hours      02/13/19 1748     Place MIKEL hose  Until discontinued      02/13/19 1748     Place sequential compression device  Until discontinued      02/13/19 1748     IP VTE HIGH RISK PATIENT  Once      02/13/19 1748            Discussed with staff/ staff discussed with ortho team  Clarita Mooney PA-C  Department of Hospital Medicine   [facility]

## 2019-02-15 NOTE — PLAN OF CARE
Problem: Adult Inpatient Plan of Care  Goal: Plan of Care Review  Outcome: Ongoing (interventions implemented as appropriate)  No signs of distress this shift.   Treated pain with prn medication per MD order.  Pt remained free of injury this shift.  No further complaints.

## 2019-02-15 NOTE — PLAN OF CARE
SW spoke with Kody at [facility].  Pt can can transfer to rehab on Monday.           Jigna He, MSW, LCSW  [facility]  [id number]

## 2019-02-15 NOTE — PLAN OF CARE
Problem: Adult Inpatient Plan of Care  Goal: Plan of Care Review  Outcome: Ongoing (interventions implemented as appropriate)  Hemodialysis tx complete, 2L removed in 4 hour tx, tolerated well. Blood returned via right lower arm AVF, 15g needles removed x2, gauze and tape applied, pressure held for 10 minutes to each site, hemostasis achieved

## 2019-02-15 NOTE — PROGRESS NOTES
Report given to Jasmina COPELAND. Transferred from unit via hospital bed by transport back to room

## 2019-02-15 NOTE — SUBJECTIVE & OBJECTIVE
Interval History: Pt received dialysis late last night, labs improved this AM. Pt states pain is same as yesterday. Discussed plan of care.    Review of Systems   Constitutional: Negative for chills and fever.   HENT: Negative for congestion.    Eyes: Positive for visual disturbance.        Blurred vision - pt with recent cataract surgery   Respiratory: Negative for cough and shortness of breath.    Cardiovascular: Negative for chest pain and palpitations.   Gastrointestinal: Negative for abdominal pain and vomiting.   Genitourinary: Negative for difficulty urinating and dysuria.   Musculoskeletal: Positive for arthralgias, back pain and gait problem. Negative for neck pain and neck stiffness.   Skin: Negative for color change.   Neurological: Positive for weakness (generalized). Negative for dizziness and syncope.   Psychiatric/Behavioral: Negative for agitation and confusion.     Objective:     Vital Signs (Most Recent):  Temp: 99.4 °F (37.4 °C) (02/14/19 1556)  Pulse: 77 (02/14/19 1620)  Resp: 20 (02/14/19 1556)  BP: 134/63 (02/14/19 1556)  SpO2: (!) 93 % (02/14/19 1556) Vital Signs (24h Range):  Temp:  [97.4 °F (36.3 °C)-99.5 °F (37.5 °C)] 99.4 °F (37.4 °C)  Pulse:  [62-85] 77  Resp:  [18-20] 20  SpO2:  [93 %-98 %] 93 %  BP: (114-176)/(59-84) 134/63     Weight: 122 kg (268 lb 15.4 oz)  Body mass index is 46.17 kg/m².    Intake/Output Summary (Last 24 hours) at 2/14/2019 1944  Last data filed at 2/14/2019 1733  Gross per 24 hour   Intake 875 ml   Output 3300 ml   Net -2425 ml      Physical Exam   Constitutional: She is oriented to person, place, and time. She appears well-developed and well-nourished. No distress.   HENT:   Head: Normocephalic and atraumatic.   Eyes: Conjunctivae are normal. Right eye exhibits no discharge. Left eye exhibits no discharge.   Neck: Normal range of motion.   Cardiovascular: Normal rate and regular rhythm.   Pulmonary/Chest: Effort normal and breath sounds normal. No respiratory  distress. She has no wheezes.   Abdominal: Soft. Bowel sounds are normal. There is no tenderness.   Musculoskeletal: She exhibits edema (chronic venous stasis).   Limited range of motion in BLE, more so on R side than L   Neurological: She is alert and oriented to person, place, and time.   Skin: She is not diaphoretic.   Psychiatric: She has a normal mood and affect. Her behavior is normal.       Significant Labs: All pertinent labs within the past 24 hours have been reviewed.    Significant Imaging: I have reviewed all pertinent imaging results/findings within the past 24 hours.

## 2019-02-15 NOTE — PLAN OF CARE
SW informed by CM that this pt is expected to d/c to rehab.  SW will send a referral to [facility] for consideration.       Jigna He, MSW, \[facility]\""W  [facility]  [id number]

## 2019-02-15 NOTE — ASSESSMENT & PLAN NOTE
-gabapentin capsule 400 mg, 400 mg, Oral, QHS  -insulin detemir U-100 pen 45 Units, 45 Units, Subcutaneous, BID  -Asp 9 units SQ AC with meals TID + SSI protocol    BG stable, cont to monitor

## 2019-02-15 NOTE — PLAN OF CARE
Problem: Occupational Therapy Goal  Goal: Occupational Therapy Goal  Goals to be met by: 2/23/19    Patient will increase functional independence with ADLs by performing:    UE Dressing with Stand-by Assistance.  LE Dressing with Minimal Assistance.  Grooming while standing at sink with Contact Guard Assistance.  Toileting from toilet with Minimal Assistance for hygiene and clothing management.   Supine to sit with Supervision.  Toilet transfer to bedside commode with Minimal Assistance.  Pt tolerate standing ~5 minutes to complete dynamic standing activity with CGA.    Outcome: Ongoing (interventions implemented as appropriate)  Evaluation completed. Initiate POC.   Anna cline OT  2/15/2019

## 2019-02-15 NOTE — ASSESSMENT & PLAN NOTE
-Check Vitamin D, B12, Folate  -MRI brain reveals no central cause  -Suspect multifactorial from:              -vision issues, medications, age, weight, neuropathy, sciatica and severe DJD knees/hips  -PT and OT consults; PT rec rehab; PMR consulted  -Avoid benzo's and other psychotropic meds  -MRI of L spine as per below

## 2019-02-15 NOTE — NURSING
Patient transported off unit to dialysis via stretcher [id number] vital signs stable. Safety and infection precautions taken. Patient is comfortable at this time.

## 2019-02-15 NOTE — CONSULTS
Inpatient consult to Physical Medicine Rehab  Consult performed by: Carmen Llamas NP  Consult ordered by: Clarita Mooney PA-C  Reason for consult: assess rehab needs        Reviewed patient history and current admission.  Rehab team following.  Full consult to follow.    ISSAC Murillo, FNP-C  Physical Medicine & Rehabilitation   02/15/2019  Spectralink: [id number]

## 2019-02-15 NOTE — PT/OT/SLP EVAL
Occupational Therapy   Evaluation/Treatment    Name: Kylie King  MRN: [id number]  Admitting Diagnosis:  Frequent falls      Recommendations:     Discharge Recommendations: rehabilitation facility  Discharge Equipment Recommendations:  (TBD)  Barriers to discharge:  None    Assessment:     Kylie King is a 63 y.o. female with a medical diagnosis of Frequent falls.  She presents alert and motivated to participated to participate in therapy session. Pt complaints of fatigue from dialysis in AM. Performance deficits affecting function: weakness, impaired endurance, gait instability, impaired balance, impaired self care skills, impaired functional mobilty, impaired cardiopulmonary response to activity, decreased lower extremity function, pain, edema, impaired sensation, decreased coordination. Pt is a high fall risk and requiring increased level of skilled assistance with ADl and functional mobility. Pt would benefit from Rehab following d/c to continue to progress towards goals and improve quality of life.     Rehab Prognosis: Good; patient would benefit from acute skilled OT services to address these deficits and reach maximum level of function.       Plan:     Patient to be seen 3 x/week to address the above listed problems via self-care/home management, therapeutic activities, therapeutic exercises, neuromuscular re-education  · Plan of Care Expires: 03/16/19  · Plan of Care Reviewed with: patient    Subjective     Chief Complaint: LE weakness  Patient/Family Comments/goals: Return to PLOF    Occupational Profile:  Living Environment: Pt lives with friend, 1SH with 5STE ( [facility]); bathroom contains walk-in shower with stool  Previous level of function: PTA, pt reports using rollator during functional mobility and wheelchair in community; pt mod-I - independent with ADL  Roles and Routines: Home dweller, pt reports she mostly watches tv during the day  Equipment Used at Home:  walker, rolling, rollator(using  stool as a shower chair)  Assistance upon Discharge: pt will have assistance from friends upon discharge home    Pain/Comfort:  · Pain Rating 1: 0/10  · Pain Rating Post-Intervention 1: 0/10    Patients cultural, spiritual, Oriental orthodox conflicts given the current situation: no    Objective:     Communicated with: RN prior to session.  Patient found supine with (no active lines) upon OT entry to room.    General Precautions: Standard, fall   Orthopedic Precautions:N/A   Braces: N/A     Occupational Performance:    Bed Mobility:    · Patient completed Supine to Sit with minimum assistance ( R LE management)    Functional Mobility/Transfers:  · Patient completed Sit <> Stand Transfer with moderate assistance  with  rolling walker    - pt completed x3 trials sit<> stand from EOB  · Functional Mobility: pt took 2-4  forward/backward steps, 4-5 lateral steps to R with moderate assistance using RW     Activities of Daily Living:  · Upper Body Dressing: minimum assistance to brooke gown like jacket while seated EOB    Cognitive/Visual Perceptual:  Cognitive/Psychosocial Skills:     -       Oriented to: Person, Place, Time and Situation   -       Follows Commands/attention:Follows multistep  commands  -       Communication: clear/fluent  -       Safety awareness/insight to disability: intact   -       Mood/Affect/Coping skills/emotional control: Appropriate to situation  Visual/Perceptual:      -Intact ( recently had cataract sx)    Physical Exam:  Postural examination/scapula alignment:    -       Rounded shoulders  -       Forward head  Skin integrity: Visible skin intact  Edema:  None noted  Dominant hand:    -       RUE  Upper Extremity Range of Motion:     -       Right Upper Extremity: WFL  -       Left Upper Extremity: WFL  Upper Extremity Strength:    -       Right Upper Extremity: WFL 5/5  -       Left Upper Extremity: WFL  5/5   Strength:    -       Right Upper Extremity: WFL  -       Left Upper Extremity:  WFL  Fine Motor Coordination:    -       Intact    AMPAC 6 Click ADL:  AMPAC Total Score: 16    Treatment & Education:  -Pt edu on OT role/POC  -Importance of OOB activity with staff assistance ( UIC throughout the day)  -Safety during functional t/f and mobility ( RW management/hand placement)  - White board updated  - Multiple self care tasks completed-- assistance level noted above  - All questions/concerns answered within OT scope of practice  - Extensive conversations regarding therapy needs following d/c/ high fall risk     * assistance with toilet transfer to bedside commode using stand-pivot method following evaluation requiring minimal assist using RW  Education:    Patient left supine with all lines intact, call button in reach and RN notified    GOALS:   Multidisciplinary Problems     Occupational Therapy Goals        Problem: Occupational Therapy Goal    Goal Priority Disciplines Outcome Interventions   Occupational Therapy Goal     OT, PT/OT Ongoing (interventions implemented as appropriate)    Description:  Goals to be met by: 2/23/19    Patient will increase functional independence with ADLs by performing:    UE Dressing with Stand-by Assistance.  LE Dressing with Minimal Assistance.  Grooming while standing at sink with Contact Guard Assistance.  Toileting from toilet with Minimal Assistance for hygiene and clothing management.   Supine to sit with Supervision.  Toilet transfer to bedside commode with Minimal Assistance.  Pt tolerate standing ~5 minutes to complete dynamic standing activity with CGA.                      History:     Past Medical History:   Diagnosis Date    Anxiety     Arthritis     DDD, Lumbar    Breast cancer 1/2013    left breast- invasive mammary carcinoma, ER/[place] positive, Her2 negative    Carotid artery stenosis 8/13/2018 6/22/2018: Carotid Duplex: SHANELL: Moderate plaquing - 2.0 m/s - <50%. LICA: Moderate plaquing - 1.6 m/s - <50%.    Cataract     Choledocholithiasis      s/p ERCP and stent placement    Chronic obstructive pulmonary disease 6/8/2018    Chronic venous insufficiency     Colon cancer 2001    CRI (chronic renal insufficiency)     one kidney    Dialysis AV fistula malfunction     ESRD (end stage renal disease) on dialysis     Former smoker 6/8/2018    GERD (gastroesophageal reflux disease)     History of acute pancreatitis 2009 2/09 s/p sphincterotomy    History of cerebrovascular accident 6/8/2018    History of colon cancer     s/p sigmoid colectomy    HTN (hypertension), benign     Hypercholesterolemia     Hyperlipidemia     Hypoxemia 6/8/2018    Intracerebral hemorrhage     Kidney stone     left    Lymphedema of both lower extremities     Obesity     Pancreatitis     Pancreatitis 2008    Physical deconditioning     Pulmonary edema     Sacroiliac joint pain     Spinal stenosis     Spinal stenosis, lumbar     Stroke 12/2012    Tobacco abuse     Type 2 diabetes mellitus with neurological manifestations, controlled     Neuropathy       Past Surgical History:   Procedure Laterality Date    AMPUTATION-TOE, partial Left 11/25/2016    Performed by Jose Delacruz Jr., DPM at [facility] OR 2ND FLR    BIOPSY, LYMPH NODE, SENTINEL Left 2/25/2013    Performed by Dirk Oneil MD at [facility] OR 2ND FLR    BLOCK-NERVE-MEDIAL BRANCH-LUMBAR Bilateral 3/1/2016    Performed by Stacie Negrete MD at [facility] MGT    BLOCK-NERVE-MEDIAL BRANCH-LUMBAR Bilateral 2/23/2016    Performed by Stacie Negrete MD at [facility] MG    BREAST BIOPSY  1/2012    left breast invasive mammary carcinoma (lateral bx) and intraductal papilloma (medial bx)    BREAST BIOPSY  1999    rt breast FA    CHOLECYSTECTOMY  2001    COLON SURGERY      CREATION -FISTULA-AV Right 5/28/2018    Performed by RICK Pollard III, MD at [facility] OR 2ND FLR    EXTRACTION, CATARACT, WITH IOL INSERTION Left 1/31/2019    Performed by Immanuel Moncada [facility] at [facility] OR    EXTRACTION, CATARACT,  WITH IOL INSERTION Right 1/24/2019    Performed by Immanuel Moncada MD at [facility] [place] OR    HERNIA REPAIR      INJECTION, FOR SENTINEL LYMPH NODE IDENTIFICATION Left 2/25/2013    Performed by Dirk Oneil MD at [facility] OR 2ND FLR    INJECTION-JOINT Bilateral 1/20/2016    Performed by Stacie Negrete MD at [facility] [place] PAIN MGT    INSERTION, TISSUE EXPANDER, BREAST Left 2/25/2013    Performed by Terry Moreno MD at [facility] OR 2ND FLR    INSERTION-CATHETER-DIALYSIS Right 4/23/2018    Performed by Jenae Salguero DO at [facility] OR    INSERTION-CATHETER-BRENNEN CATH - C-ARM N/A 11/11/2016    Performed by Bobby Blount Jr., MD at [facility] [place] OR    left nephrectomy      atrophic kidney and hydronephrosis    left oopherectomy  2001    MASTECTOMY  2013    left    MASTECTOMY Left 2/25/2013    Performed by Dirk Oneil MD at [facility] OR 2ND FLR    MASTOPEXY Right 2/25/2013    Performed by Terry Moreno MD at [facility] OR 2ND FLR    NEPHRECTOMY  2011    lap    RADIOFREQUENCY THERMOCOAGULATION (RFTC)-NERVE-MEDIAN BRANCH-LUMBAR Left 3/30/2016    Performed by Stacie Negrete MD at [facility] [place] PAIN MGT    RADIOFREQUENCY THERMOCOAGULATION (RFTC)-NERVE-MEDIAN BRANCH-LUMBAR Right 3/16/2016    Performed by Stacie Negrete MD at [facility] [place] PAIN MGT    RECONSTRUCTION, BREAST, USING LATISSIMUS DORSI MYOCUTANEOUS FLAP Left 4/11/2013    Performed by Terry Moreno MD at [facility] OR 2ND FLR    RECONSTRUCTION-NIPPLE Left 1/9/2014    Performed by Terry Moreno MD at [facility] OR 2ND FLR    REPAIR, HERNIA, VENTRAL, LAPAROSCOPIC N/A 3/28/2014    Performed by Min Polanco MD at [facility] OR 2ND FLR    REVISION Bilateral 1/9/2014    Performed by Terry Moreno MD at [facility] OR 2ND FLR    REVISION, AV FISTULA Right 8/15/2018    Performed by RICK Pollard III, MD at [facility] OR 2ND FLR    SIGMOIDECTOMY  2001    TOTAL REDUCTION MAMMOPLASTY Right 2013    TRANSPOSITION, VEIN Right 8/15/2018    Performed by RICK Pollard  III, MD at [facility] OR [address] [place] [address]       Time Tracking:     OT Date of Treatment: 02/15/19  OT Start Time: 1438  OT Stop Time: 1500  OT Total Time (min): 22 min    Billable Minutes:Evaluation 12  Therapeutic Activity 10    Anna cline OT  2/15/2019

## 2019-02-16 LAB
ALBUMIN SERPL BCP-MCNC: 3.1 G/DL
ANION GAP SERPL CALC-SCNC: 12 MMOL/L
BASOPHILS # BLD AUTO: 0.03 K/UL
BASOPHILS NFR BLD: 0.9 %
BUN SERPL-MCNC: 37 MG/DL
CALCIUM SERPL-MCNC: 8.9 MG/DL
CHLORIDE SERPL-SCNC: 103 MMOL/L
CO2 SERPL-SCNC: 20 MMOL/L
CREAT SERPL-MCNC: 6.9 MG/DL
DIFFERENTIAL METHOD: ABNORMAL
EOSINOPHIL # BLD AUTO: 0.1 K/UL
EOSINOPHIL NFR BLD: 2.6 %
ERYTHROCYTE [DISTWIDTH] IN BLOOD BY AUTOMATED COUNT: 17.2 %
EST. GFR  (AFRICAN AMERICAN): 6.7 ML/MIN/1.73 M^2
EST. GFR  (NON AFRICAN AMERICAN): 5.8 ML/MIN/1.73 M^2
GLUCOSE SERPL-MCNC: 131 MG/DL
HCT VFR BLD AUTO: 27.2 %
HGB BLD-MCNC: 8.5 G/DL
IMM GRANULOCYTES # BLD AUTO: 0.01 K/UL
IMM GRANULOCYTES NFR BLD AUTO: 0.3 %
LYMPHOCYTES # BLD AUTO: 1.1 K/UL
LYMPHOCYTES NFR BLD: 32.4 %
MAGNESIUM SERPL-MCNC: 2 MG/DL
MCH RBC QN AUTO: 30.2 PG
MCHC RBC AUTO-ENTMCNC: 31.3 G/DL
MCV RBC AUTO: 97 FL
MONOCYTES # BLD AUTO: 0.5 K/UL
MONOCYTES NFR BLD: 14 %
NEUTROPHILS # BLD AUTO: 1.7 K/UL
NEUTROPHILS NFR BLD: 49.8 %
NRBC BLD-RTO: 0 /100 WBC
PHOSPHATE SERPL-MCNC: 5.5 MG/DL
PLATELET # BLD AUTO: 165 K/UL
PMV BLD AUTO: 10.9 FL
POCT GLUCOSE: 124 MG/DL (ref 70–110)
POCT GLUCOSE: 135 MG/DL (ref 70–110)
POCT GLUCOSE: 137 MG/DL (ref 70–110)
POCT GLUCOSE: 196 MG/DL (ref 70–110)
POTASSIUM SERPL-SCNC: 4.6 MMOL/L
RBC # BLD AUTO: 2.81 M/UL
SODIUM SERPL-SCNC: 135 MMOL/L
WBC # BLD AUTO: 3.49 K/UL

## 2019-02-16 PROCEDURE — G0378 HOSPITAL OBSERVATION PER HR: HCPCS

## 2019-02-16 PROCEDURE — 25000003 PHARM REV CODE 250: Performed by: PHYSICIAN ASSISTANT

## 2019-02-16 PROCEDURE — 63600175 PHARM REV CODE 636 W HCPCS: Performed by: INTERNAL MEDICINE

## 2019-02-16 PROCEDURE — 82040 ASSAY OF SERUM ALBUMIN: CPT

## 2019-02-16 PROCEDURE — 99225 PR SUBSEQUENT OBSERVATION CARE,LEVEL II: ICD-10-PCS | Mod: ,,, | Performed by: PHYSICIAN ASSISTANT

## 2019-02-16 PROCEDURE — 63600175 PHARM REV CODE 636 W HCPCS: Performed by: PHYSICIAN ASSISTANT

## 2019-02-16 PROCEDURE — 80048 BASIC METABOLIC PNL TOTAL CA: CPT

## 2019-02-16 PROCEDURE — 85025 COMPLETE CBC W/AUTO DIFF WBC: CPT

## 2019-02-16 PROCEDURE — 84100 ASSAY OF PHOSPHORUS: CPT

## 2019-02-16 PROCEDURE — 36415 COLL VENOUS BLD VENIPUNCTURE: CPT

## 2019-02-16 PROCEDURE — 99225 PR SUBSEQUENT OBSERVATION CARE,LEVEL II: CPT | Mod: ,,, | Performed by: PHYSICIAN ASSISTANT

## 2019-02-16 PROCEDURE — 25000003 PHARM REV CODE 250: Performed by: INTERNAL MEDICINE

## 2019-02-16 PROCEDURE — 83735 ASSAY OF MAGNESIUM: CPT

## 2019-02-16 PROCEDURE — 82962 GLUCOSE BLOOD TEST: CPT

## 2019-02-16 RX ADMIN — CALCIUM ACETATE 667 MG: 667 CAPSULE ORAL at 05:02

## 2019-02-16 RX ADMIN — STANDARDIZED SENNA CONCENTRATE AND DOCUSATE SODIUM 1 TABLET: 8.6; 5 TABLET, FILM COATED ORAL at 09:02

## 2019-02-16 RX ADMIN — INSULIN ASPART 4 UNITS: 100 INJECTION, SOLUTION INTRAVENOUS; SUBCUTANEOUS at 05:02

## 2019-02-16 RX ADMIN — CARVEDILOL 25 MG: 25 TABLET, FILM COATED ORAL at 09:02

## 2019-02-16 RX ADMIN — FOLIC ACID 1 MG: 1 TABLET ORAL at 09:02

## 2019-02-16 RX ADMIN — Medication 1 CAPSULE: at 09:02

## 2019-02-16 RX ADMIN — HYDRALAZINE HYDROCHLORIDE 25 MG: 25 TABLET, FILM COATED ORAL at 09:02

## 2019-02-16 RX ADMIN — HEPARIN SODIUM 5000 UNITS: 5000 INJECTION, SOLUTION INTRAVENOUS; SUBCUTANEOUS at 01:02

## 2019-02-16 RX ADMIN — AMLODIPINE BESYLATE 10 MG: 10 TABLET ORAL at 06:02

## 2019-02-16 RX ADMIN — CITALOPRAM HYDROBROMIDE 20 MG: 20 TABLET ORAL at 09:02

## 2019-02-16 RX ADMIN — Medication 150 MG: at 09:02

## 2019-02-16 RX ADMIN — HEPARIN SODIUM 5000 UNITS: 5000 INJECTION, SOLUTION INTRAVENOUS; SUBCUTANEOUS at 06:02

## 2019-02-16 RX ADMIN — HEPARIN SODIUM 5000 UNITS: 5000 INJECTION, SOLUTION INTRAVENOUS; SUBCUTANEOUS at 09:02

## 2019-02-16 RX ADMIN — CALCIUM ACETATE 667 MG: 667 CAPSULE ORAL at 12:02

## 2019-02-16 RX ADMIN — INSULIN DETEMIR 30 UNITS: 100 INJECTION, SOLUTION SUBCUTANEOUS at 09:02

## 2019-02-16 RX ADMIN — CALCIUM ACETATE 667 MG: 667 CAPSULE ORAL at 09:02

## 2019-02-16 RX ADMIN — LETROZOLE 2.5 MG: 2.5 TABLET ORAL at 12:02

## 2019-02-16 RX ADMIN — GABAPENTIN 400 MG: 100 CAPSULE ORAL at 09:02

## 2019-02-16 RX ADMIN — INSULIN DETEMIR 30 UNITS: 100 INJECTION, SOLUTION SUBCUTANEOUS at 12:02

## 2019-02-16 RX ADMIN — ATORVASTATIN CALCIUM 40 MG: 20 TABLET, FILM COATED ORAL at 09:02

## 2019-02-16 NOTE — ASSESSMENT & PLAN NOTE
-Check Vitamin D, B12, Folate: Vit D low, replaced; B12 in process; Folate low, supplemented  -MRI brain reveals no central cause  -Suspect multifactorial from:              -vision issues, medications, age, weight, neuropathy, sciatica and severe DJD knees/hips  -PT and OT consults; PT rec rehab; PMR consulted; pt agreeable to transfer to rehab monday  -Avoid benzo's and other psychotropic meds  -MRI of L spine as per below

## 2019-02-16 NOTE — PLAN OF CARE
Problem: Adult Inpatient Plan of Care  Goal: Plan of Care Review  Outcome: Ongoing (interventions implemented as appropriate)  Pt aox4, complaining of chronic back pain and pain to R side area after falling at home. Pt does not appear to be in any distress, uses call bell appropriately, bed alarm set as pt is fall risk and has had falls at home. Plan reviewed with pt and questions answered. Safety precautions maintained.

## 2019-02-16 NOTE — SUBJECTIVE & OBJECTIVE
"Interval History: Went by to see pt twice this AM, however she was in dialysis. Finally able to see pt this evening and she angrily voiced several complaints. Firstly she questioned why she hasn't been seen by her regular outpatient nephrologist. Reassured her that nephrology team in house is capable of meeting her HD care needs. She also complains that she was taken to dialysis this AM before she was able to finish her breakfast. Then after dialysis she reports that she was forgotten about and patient transport took several hours to come and move her back to her room because "no one wants to move fast in the hospital". She says nursing "cannot help move her to the bathroom". She reports frustration with "seeing so many doctors" and wants just "one doctor in charge of her case". Reassured her that I was responsible for that role as her hospitalist. And that I have consulted necessary specialty services to help generate the best treatment plan for her. I inquired about her pain levels and offered to adjust pain medications for her, but she replied that "pain is not the problem, the problem is that I cannot walk". Explained that I believe she is a good rehab candidate and could benefit from rehab, but understand if she does not want to wait in the hospital for placement. Told her I would escalate concerns to charge nurse and make the SP on my service aware. Pt voiced understanding and seemed to be more at ease by the end of the conversation.    Review of Systems   Constitutional: Negative for chills and fever.   HENT: Negative for congestion.    Eyes: Positive for visual disturbance.        Blurred vision - pt with recent cataract surgery   Respiratory: Negative for cough and shortness of breath.    Cardiovascular: Negative for chest pain and palpitations.   Gastrointestinal: Negative for abdominal pain and vomiting.   Genitourinary: Negative for difficulty urinating and dysuria.   Musculoskeletal: Positive for " arthralgias, back pain and gait problem.   Skin: Negative for color change.   Neurological: Positive for weakness (generalized). Negative for dizziness and syncope.   Psychiatric/Behavioral: Negative for agitation and confusion.     Objective:     Vital Signs (Most Recent):  Temp: 98.8 °F (37.1 °C) (02/15/19 1529)  Pulse: 67 (02/15/19 1622)  Resp: 18 (02/15/19 1529)  BP: (!) 145/70 (02/15/19 1529)  SpO2: 97 % (02/15/19 1529) Vital Signs (24h Range):  Temp:  [96.6 °F (35.9 °C)-99.1 °F (37.3 °C)] 98.8 °F (37.1 °C)  Pulse:  [60-79] 67  Resp:  [13-20] 18  SpO2:  [93 %-100 %] 97 %  BP: ()/(47-86) 145/70     Weight: 122 kg (268 lb 15.4 oz)  Body mass index is 46.17 kg/m².    Intake/Output Summary (Last 24 hours) at 2/15/2019 1903  Last data filed at 2/15/2019 1245  Gross per 24 hour   Intake 700 ml   Output 2700 ml   Net -2000 ml      Physical Exam   Constitutional: She appears well-developed and well-nourished.   HENT:   Head: Normocephalic and atraumatic.   Eyes: Right eye exhibits no discharge. Left eye exhibits no discharge.   Neck: Normal range of motion.   Pulmonary/Chest: Effort normal. No respiratory distress.   Musculoskeletal: She exhibits edema (chronic venous stasis).   Limited range of motion in BLE, more so on R side than L   Neurological: She is alert.   Skin: No pallor.   Psychiatric: She is agitated.       Significant Labs: All pertinent labs within the past 24 hours have been reviewed.    Significant Imaging: I have reviewed all pertinent imaging results/findings within the past 24 hours.

## 2019-02-16 NOTE — ASSESSMENT & PLAN NOTE
-gabapentin capsule 400 mg, 400 mg, Oral, QHS  -insulin detemir U-100 pen 45 Units, 45 Units, Subcutaneous, BID  -Asp 9 units SQ AC with meals TID + SSI protocol    BG stable, cont to monitor  2/15:hypoglycemic low 60s; decreased detemir to 30U and aspart to 4U

## 2019-02-16 NOTE — ASSESSMENT & PLAN NOTE
-Check Vitamin D, B12, Folate: Vit D low, replaced; B12 in process; Folate low, supplemented  -MRI brain reveals no central cause  -Suspect multifactorial from:              -vision issues, medications, age, weight, neuropathy, sciatica and severe DJD knees/hips  -PT and OT consults; PT rec rehab; PMR consulted  -Avoid benzo's and other psychotropic meds  -MRI of L spine as per below

## 2019-02-16 NOTE — ASSESSMENT & PLAN NOTE
Anemia in ESRD    -Nephrology consult  -Pt dialyzed overnight, labs much improved this AM  -calcium acetate capsule 667 mg, 667 mg, Oral, TID WM  -ergocalciferol capsule 50,000 Units, 50,000 Units, Oral, Q7 Days  - Defer for Procrit to Nephro  - B12, Fe studies, folate ordered, see above  Hgb 7.8 this AM, started folate and niferix

## 2019-02-16 NOTE — ASSESSMENT & PLAN NOTE
-amLODIPine tablet 10 mg, 10 mg, Oral, QAM  -carvedilol tablet 25 mg, 25 mg, Oral, BID  -hydrALAZINE tablet 25 mg, 25 mg, Oral, BID    BP stable

## 2019-02-16 NOTE — PROGRESS NOTES
"[facility] Hospital Medicine  Progress Note    Patient Name: Kylie King  MRN: [id number]  Patient Class: OP- Observation   Admission Date: 2/13/2019  Length of Stay: 0 days  Attending Physician: JIM Lilly MD  Primary Care Provider: Virginia Foote MD    [facility] Medicine Team: [facility] E Clarita Mooney PA-C    Subjective:     Principal Problem:Frequent falls    HPI:  Ms. King is a 64yo lady with multiple past medical problems (see below), who has ESRD on HD M/W/F due to longstanding uncontrolled DM2 and renovascular HTN.  She has always had "bad balance" due to her obesity, age and neuropathy, but over the past year she has noticed an up-tick in her falls.  Over the past 4 months this has almost doubled, and now over the past few weeks she has fallen 8 times.  Her problems first started with symptoms of right sided sciatica pain (sharp pain radiating down the back of her leg) with weakness.  This led to her guarding the right side with overuse of her left leg.  After a week or so of this and still falling, she suddenly noticed that her left leg would, "just suddenly give out" on her.  She would go to make a step and on her left foot touching, it was, "like the leg just didn't have any strength and down I'd go"  Her right knee is extremely painful to any pressure or movement and still with some sciatica symptoms.  She is now having difficulty ambulating.  She has some right buttock pain as well, but no harry lumbar back pain.  She did see her PCP and an Orthopedist for these symptoms.  She was given a Rx for Norflex, which did seem to assist some with the pain, but actually made her balance significantly worse, so she stopped it after 3 days.     Due to the poor balance and fear of using the steps, she missed her HD session on Monday.  She was planning to go today, but again fell and struck her knees and her right lower ribcage.  She feels, "a little knot there now."  To " "complicate things, she also recently had cataract surgery (1/30/19), so this is impeding her vision to some degree (along with the dark sunglasses).    Hospital Course:  Pt admitted for L sided leg pain that has been so severe she has had multiple falls and missed her HD appointments. MRI brain shows no acute process to attribute frequent falls. Knee and hip xray show no acute fracture or dislocation. MRI lumbar shows mod/severe spinal canal stenosis. Pt not able to complete MRI pelvis secondary to pain/anxiety. Ortho consulted, no acute surgical intervention necessary. Awaiting ortho spine to evaluate pt. PT evaluated pt, recommending rehab. Able to transfer to [facility] Monday.    Interval History: Went by to see pt twice this AM, however she was in dialysis. Finally able to see pt this evening and she angrily voiced several complaints. Firstly she questioned why she hasn't been seen by her regular outpatient nephrologist. Reassured her that nephrology team in house is capable of meeting her HD care needs. She also complains that she was taken to dialysis this AM before she was able to finish her breakfast. Then after dialysis she reports that she was forgotten about and patient transport took several hours to come and move her back to her room because "no one wants to move fast in the hospital". She says nursing "cannot help move her to the bathroom". She reports frustration with "seeing so many doctors" and wants just "one doctor in charge of her case". Reassured her that I was responsible for that role as her hospitalist. And that I have consulted necessary specialty services to help generate the best treatment plan for her. I inquired about her pain levels and offered to adjust pain medications for her, but she replied that "pain is not the problem, the problem is that I cannot walk". Explained that I believe she is a good rehab candidate and could benefit from rehab, but understand if she does not want to wait " in the hospital for placement. Told her I would escalate concerns to charge nurse and make the SP on my service aware. Pt voiced understanding and seemed to be more at ease by the end of the conversation.    Review of Systems   Constitutional: Negative for chills and fever.   HENT: Negative for congestion.    Eyes: Positive for visual disturbance.        Blurred vision - pt with recent cataract surgery   Respiratory: Negative for cough and shortness of breath.    Cardiovascular: Negative for chest pain and palpitations.   Gastrointestinal: Negative for abdominal pain and vomiting.   Genitourinary: Negative for difficulty urinating and dysuria.   Musculoskeletal: Positive for arthralgias, back pain and gait problem.   Skin: Negative for color change.   Neurological: Positive for weakness (generalized). Negative for dizziness and syncope.   Psychiatric/Behavioral: Negative for agitation and confusion.     Objective:     Vital Signs (Most Recent):  Temp: 98.8 °F (37.1 °C) (02/15/19 1529)  Pulse: 67 (02/15/19 1622)  Resp: 18 (02/15/19 1529)  BP: (!) 145/70 (02/15/19 1529)  SpO2: 97 % (02/15/19 1529) Vital Signs (24h Range):  Temp:  [96.6 °F (35.9 °C)-99.1 °F (37.3 °C)] 98.8 °F (37.1 °C)  Pulse:  [60-79] 67  Resp:  [13-20] 18  SpO2:  [93 %-100 %] 97 %  BP: ()/(47-86) 145/70     Weight: 122 kg (268 lb 15.4 oz)  Body mass index is 46.17 kg/m².    Intake/Output Summary (Last 24 hours) at 2/15/2019 1903  Last data filed at 2/15/2019 1245  Gross per 24 hour   Intake 700 ml   Output 2700 ml   Net -2000 ml      Physical Exam   Constitutional: She appears well-developed and well-nourished.   HENT:   Head: Normocephalic and atraumatic.   Eyes: Right eye exhibits no discharge. Left eye exhibits no discharge.   Neck: Normal range of motion.   Pulmonary/Chest: Effort normal. No respiratory distress.   Musculoskeletal: She exhibits edema (chronic venous stasis).   Limited range of motion in BLE, more so on R side than L    Neurological: She is alert.   Skin: No pallor.   Psychiatric: She is agitated.       Significant Labs: All pertinent labs within the past 24 hours have been reviewed.    Significant Imaging: I have reviewed all pertinent imaging results/findings within the past 24 hours.    Assessment/Plan:      * Frequent falls    -Check Vitamin D, B12, Folate: Vit D low, replaced; B12 in process; Folate low, supplemented  -MRI brain reveals no central cause  -Suspect multifactorial from:              -vision issues, medications, age, weight, neuropathy, sciatica and severe DJD knees/hips  -PT and OT consults; PT rec rehab; PMR consulted  -Avoid benzo's and other psychotropic meds  -MRI of L spine as per below        Lumbar stenosis    R sided sciatica  -Check MRI pelvis and lumbo sacral area   Lumbar MRI shows mod-sev spinal canal stenosis without evidence of fx/dislocations   - pt unable to complete pelvic MRI 2/2 anxiety/ pain  -orphenadrine 12 hr tablet 100 mg, 100 mg, Oral, BID PRN spasm of muscles    No surgical intervention warranted at this time per ortho, recommend Dr. Olivia from ortho spine to evaluate pt       Acute pain of both knees    -Ortho consult  -Local cooling pack  -Brace as per PT or ortho     ESRD (end stage renal disease) on dialysis    Anemia in ESRD    -Nephrology consult  -Pt dialyzed overnight, labs much improved this AM  -calcium acetate capsule 667 mg, 667 mg, Oral, TID WM  -ergocalciferol capsule 50,000 Units, 50,000 Units, Oral, Q7 Days  - Defer for Procrit to Nephro  - B12, Fe studies, folate ordered, see above  Hgb 7.8 this AM, started folate and niferix     Hyperkalemia    -HD orders written already  -Telemetry    K WNL after dialysis     DM type 2, uncontrolled, with neuropathy    -gabapentin capsule 400 mg, 400 mg, Oral, QHS  -insulin detemir U-100 pen 45 Units, 45 Units, Subcutaneous, BID  -Asp 9 units SQ AC with meals TID + SSI protocol    BG stable, cont to monitor  2/15:hypoglycemic low  60s; decreased detemir to 30U and aspart to 4U     Secondary renovascular hypertension, malignant    -amLODIPine tablet 10 mg, 10 mg, Oral, QAM  -carvedilol tablet 25 mg, 25 mg, Oral, BID  -hydrALAZINE tablet 25 mg, 25 mg, Oral, BID    BP stable       Hypercholesterolemia    -atorvastatin tablet 40 mg, 40 mg, Oral, Daily  -omega-3 fatty acids-fish oil 340-1,000 mg capsule 1 capsule, 1 capsule, Oral, BID       Depression    -citalopram tablet 20 mg, 20 mg, Oral, Nightly         History of breast cancer    -letrozole tablet 2.5 mg, 2.5 mg, Oral, Nightly         VTE Risk Mitigation (From admission, onward)        Ordered     heparin (porcine) injection 5,000 Units  Every 8 hours      02/13/19 1748     Place MIKEL hose  Until discontinued      02/13/19 1748     Place sequential compression device  Until discontinued      02/13/19 1748     IP VTE HIGH RISK PATIENT  Once      02/13/19 1748              Clarita Mooney PA-C  Department of Hospital Medicine   [facility]

## 2019-02-16 NOTE — SUBJECTIVE & OBJECTIVE
Interval History: No acute events overnight. Pt sitting in bed in NAD. Reports that her legs feel a little better than yesterday and she would like a walker/wheelchair in her room so that she can try to walk or at least get out of the room for a while. Charge nurse notified of medical equipment request. Pt agreeable to tranfer to rehab Monday. No new complaints.    Review of Systems   Constitutional: Negative for chills and fever.   HENT: Negative for congestion.    Eyes: Positive for visual disturbance.        Blurred vision - pt with recent cataract surgery   Respiratory: Negative for cough and shortness of breath.    Cardiovascular: Negative for chest pain and palpitations.   Gastrointestinal: Negative for abdominal pain and vomiting.   Genitourinary: Negative for difficulty urinating and dysuria.   Musculoskeletal: Positive for arthralgias, back pain and gait problem.   Skin: Negative for color change.   Neurological: Positive for weakness (generalized). Negative for dizziness and syncope.   Psychiatric/Behavioral: Negative for agitation and confusion.     Objective:     Vital Signs (Most Recent):  Temp: 98.9 °F (37.2 °C) (02/16/19 1158)  Pulse: 73 (02/16/19 1203)  Resp: 20 (02/16/19 1158)  BP: 132/64 (02/16/19 1311)  SpO2: 95 % (02/16/19 1158) Vital Signs (24h Range):  Temp:  [97.5 °F (36.4 °C)-99.4 °F (37.4 °C)] 98.9 °F (37.2 °C)  Pulse:  [67-77] 73  Resp:  [18-20] 20  SpO2:  [95 %-98 %] 95 %  BP: (132-175)/(61-85) 132/64     Weight: 122 kg (268 lb 15.4 oz)  Body mass index is 46.17 kg/m².    Intake/Output Summary (Last 24 hours) at 2/16/2019 1343  Last data filed at 2/16/2019 0600  Gross per 24 hour   Intake 250 ml   Output --   Net 250 ml      Physical Exam   Constitutional: She is oriented to person, place, and time. She appears well-developed and well-nourished. No distress.   HENT:   Head: Normocephalic and atraumatic.   Eyes: Conjunctivae are normal. Right eye exhibits no discharge. Left eye exhibits no  discharge.   Neck: Normal range of motion.   Cardiovascular: Normal rate and regular rhythm.   Pulmonary/Chest: Effort normal and breath sounds normal. No respiratory distress. She has no wheezes.   Abdominal: Soft. Bowel sounds are normal. There is no tenderness.   Musculoskeletal: She exhibits edema (chronic venous stasis).   Limited range of motion in BLE, more so on R side than L   Neurological: She is alert and oriented to person, place, and time.   Skin: She is not diaphoretic.   Psychiatric: She has a normal mood and affect. Her behavior is normal.       Significant Labs: All pertinent labs within the past 24 hours have been reviewed.    Significant Imaging: I have reviewed all pertinent imaging results/findings within the past 24 hours.

## 2019-02-16 NOTE — PLAN OF CARE
Problem: Adult Inpatient Plan of Care  Goal: Plan of Care Review  Outcome: Ongoing (interventions implemented as appropriate)  Fall precautions continued,bed low and locked siderailsup x 2,no falls sustained. Pt. Had dialysis on Friday, 2 liters removed,access to right arm with + thrill and bruit,dressing to site dry and intact. Pt has a history of diabetes,blood sugar monitored ac and hs last blood sugar 146,no coverage needed. WBC count 4.1,afebrile,no apparent signs of infection. No redness or breakdown noted,maximilian. Lower legs edematous and red,skin warm  To touch,several scabbed areas to right lower leg. Continue current plan of care.

## 2019-02-16 NOTE — PROGRESS NOTES
"[facility] Hospital Medicine  Progress Note    Patient Name: Kylie King  MRN: [id number]  Patient Class: OP- Observation   Admission Date: 2/13/2019  Length of Stay: 0 days  Attending Physician: JIM Lilly MD  Primary Care Provider: Virginia Foote MD    [facility] Medicine Team: [facility] E Clarita Mooney PA-C    Subjective:     Principal Problem:Frequent falls    HPI:  Ms. King is a 62yo lady with multiple past medical problems (see below), who has ESRD on HD M/W/F due to longstanding uncontrolled DM2 and renovascular HTN.  She has always had "bad balance" due to her obesity, age and neuropathy, but over the past year she has noticed an up-tick in her falls.  Over the past 4 months this has almost doubled, and now over the past few weeks she has fallen 8 times.  Her problems first started with symptoms of right sided sciatica pain (sharp pain radiating down the back of her leg) with weakness.  This led to her guarding the right side with overuse of her left leg.  After a week or so of this and still falling, she suddenly noticed that her left leg would, "just suddenly give out" on her.  She would go to make a step and on her left foot touching, it was, "like the leg just didn't have any strength and down I'd go"  Her right knee is extremely painful to any pressure or movement and still with some sciatica symptoms.  She is now having difficulty ambulating.  She has some right buttock pain as well, but no harry lumbar back pain.  She did see her PCP and an Orthopedist for these symptoms.  She was given a Rx for Norflex, which did seem to assist some with the pain, but actually made her balance significantly worse, so she stopped it after 3 days.     Due to the poor balance and fear of using the steps, she missed her HD session on Monday.  She was planning to go today, but again fell and struck her knees and her right lower ribcage.  She feels, "a little knot there now."  To " complicate things, she also recently had cataract surgery (1/30/19), so this is impeding her vision to some degree (along with the dark sunglasses).    Hospital Course:  Pt admitted for L sided leg pain that has been so severe she has had multiple falls and missed her HD appointments. MRI brain shows no acute process to attribute frequent falls. Knee and hip xray show no acute fracture or dislocation. MRI lumbar shows mod/severe spinal canal stenosis. Pt not able to complete MRI pelvis secondary to pain/anxiety. Ortho consulted, no acute surgical intervention necessary. Awaiting ortho spine to evaluate pt. PT evaluated pt, recommending rehab. Able to transfer to [facility] Monday.    Interval History: No acute events overnight. Pt sitting in bed in NAD. Reports that her legs feel a little better than yesterday and she would like a walker/wheelchair in her room so that she can try to walk or at least get out of the room for a while. Charge nurse notified of medical equipment request. Pt agreeable to tranfer to rehab Monday. No new complaints.    Review of Systems   Constitutional: Negative for chills and fever.   HENT: Negative for congestion.    Eyes: Positive for visual disturbance.        Blurred vision - pt with recent cataract surgery   Respiratory: Negative for cough and shortness of breath.    Cardiovascular: Negative for chest pain and palpitations.   Gastrointestinal: Negative for abdominal pain and vomiting.   Genitourinary: Negative for difficulty urinating and dysuria.   Musculoskeletal: Positive for arthralgias, back pain and gait problem.   Skin: Negative for color change.   Neurological: Positive for weakness (generalized). Negative for dizziness and syncope.   Psychiatric/Behavioral: Negative for agitation and confusion.     Objective:     Vital Signs (Most Recent):  Temp: 98.9 °F (37.2 °C) (02/16/19 1158)  Pulse: 73 (02/16/19 1203)  Resp: 20 (02/16/19 1158)  BP: 132/64 (02/16/19 1311)  SpO2: 95 %  (02/16/19 1158) Vital Signs (24h Range):  Temp:  [97.5 °F (36.4 °C)-99.4 °F (37.4 °C)] 98.9 °F (37.2 °C)  Pulse:  [67-77] 73  Resp:  [18-20] 20  SpO2:  [95 %-98 %] 95 %  BP: (132-175)/(61-85) 132/64     Weight: 122 kg (268 lb 15.4 oz)  Body mass index is 46.17 kg/m².    Intake/Output Summary (Last 24 hours) at 2/16/2019 1343  Last data filed at 2/16/2019 0600  Gross per 24 hour   Intake 250 ml   Output --   Net 250 ml      Physical Exam   Constitutional: She is oriented to person, place, and time. She appears well-developed and well-nourished. No distress.   HENT:   Head: Normocephalic and atraumatic.   Eyes: Conjunctivae are normal. Right eye exhibits no discharge. Left eye exhibits no discharge.   Neck: Normal range of motion.   Cardiovascular: Normal rate and regular rhythm.   Pulmonary/Chest: Effort normal and breath sounds normal. No respiratory distress. She has no wheezes.   Abdominal: Soft. Bowel sounds are normal. There is no tenderness.   Musculoskeletal: She exhibits edema (chronic venous stasis).   Limited range of motion in BLE, more so on R side than L   Neurological: She is alert and oriented to person, place, and time.   Skin: She is not diaphoretic.   Psychiatric: She has a normal mood and affect. Her behavior is normal.       Significant Labs: All pertinent labs within the past 24 hours have been reviewed.    Significant Imaging: I have reviewed all pertinent imaging results/findings within the past 24 hours.    Assessment/Plan:      * Frequent falls    -Check Vitamin D, B12, Folate: Vit D low, replaced; B12 in process; Folate low, supplemented  -MRI brain reveals no central cause  -Suspect multifactorial from:              -vision issues, medications, age, weight, neuropathy, sciatica and severe DJD knees/hips  -PT and OT consults; PT rec rehab; PMR consulted; pt agreeable to transfer to rehab monday  -Avoid benzo's and other psychotropic meds  -MRI of L spine as per below        Lumbar stenosis     R sided sciatica  -Check MRI pelvis and lumbo sacral area   Lumbar MRI shows mod-sev spinal canal stenosis without evidence of fx/dislocations   - pt unable to complete pelvic MRI 2/2 anxiety/ pain  -orphenadrine 12 hr tablet 100 mg, 100 mg, Oral, BID PRN spasm of muscles    No surgical intervention warranted at this time per ortho, recommend Dr. Olivia from ortho spine to evaluate pt       Acute pain of both knees    -Ortho consult  -Local cooling pack  -Brace as per PT or ortho     ESRD (end stage renal disease) on dialysis    Anemia in ESRD    -Nephrology consult  -Pt dialyzed overnight, labs much improved this AM  -calcium acetate capsule 667 mg, 667 mg, Oral, TID WM  -ergocalciferol capsule 50,000 Units, 50,000 Units, Oral, Q7 Days  - Defer for Procrit to Nephro  - B12, Fe studies, folate ordered, see above  Hgb 7.8 this AM, started folate and niferix> Hgb 8.5     Hyperkalemia    -HD orders written already  -Telemetry    K WNL after dialysis, cont to monitor     DM type 2, uncontrolled, with neuropathy    -gabapentin capsule 400 mg, 400 mg, Oral, QHS  -insulin detemir U-100 pen 45 Units, 45 Units, Subcutaneous, BID  -Asp 9 units SQ AC with meals TID + SSI protocol    BG stable, cont to monitor  2/15:hypoglycemic low 60s; decreased detemir to 30U and aspart to 4U  2/16: BG at goal throughout the day     Secondary renovascular hypertension, malignant    -amLODIPine tablet 10 mg, 10 mg, Oral, QAM  -carvedilol tablet 25 mg, 25 mg, Oral, BID  -hydrALAZINE tablet 25 mg, 25 mg, Oral, BID    BP stable       Hypercholesterolemia    -atorvastatin tablet 40 mg, 40 mg, Oral, Daily  -omega-3 fatty acids-fish oil 340-1,000 mg capsule 1 capsule, 1 capsule, Oral, BID       Depression    -citalopram tablet 20 mg, 20 mg, Oral, Nightly         History of breast cancer    -letrozole tablet 2.5 mg, 2.5 mg, Oral, Nightly         VTE Risk Mitigation (From admission, onward)        Ordered     heparin (porcine) injection 5,000  Units  Every 8 hours      02/13/19 1748     Place MIKEL hose  Until discontinued      02/13/19 1748     Place sequential compression device  Until discontinued      02/13/19 1748     IP VTE HIGH RISK PATIENT  Once      02/13/19 1748            Discussed with staff  Clarita Mooney PA-C  Department of Hospital Medicine   [facility]   vital signs

## 2019-02-16 NOTE — ASSESSMENT & PLAN NOTE
Anemia in ESRD    -Nephrology consult  -Pt dialyzed overnight, labs much improved this AM  -calcium acetate capsule 667 mg, 667 mg, Oral, TID WM  -ergocalciferol capsule 50,000 Units, 50,000 Units, Oral, Q7 Days  - Defer for Procrit to Nephro  - B12, Fe studies, folate ordered, see above  Hgb 7.8 this AM, started folate and niferix> Hgb 8.5

## 2019-02-16 NOTE — ASSESSMENT & PLAN NOTE
-gabapentin capsule 400 mg, 400 mg, Oral, QHS  -insulin detemir U-100 pen 45 Units, 45 Units, Subcutaneous, BID  -Asp 9 units SQ AC with meals TID + SSI protocol    BG stable, cont to monitor  2/15:hypoglycemic low 60s; decreased detemir to 30U and aspart to 4U  2/16: BG at goal throughout the day

## 2019-02-17 LAB
ALBUMIN SERPL BCP-MCNC: 3 G/DL
ANION GAP SERPL CALC-SCNC: 13 MMOL/L
BASOPHILS # BLD AUTO: 0.02 K/UL
BASOPHILS NFR BLD: 0.6 %
BUN SERPL-MCNC: 59 MG/DL
CALCIUM SERPL-MCNC: 8.5 MG/DL
CHLORIDE SERPL-SCNC: 103 MMOL/L
CO2 SERPL-SCNC: 20 MMOL/L
CREAT SERPL-MCNC: 9.3 MG/DL
DIFFERENTIAL METHOD: ABNORMAL
EOSINOPHIL # BLD AUTO: 0.1 K/UL
EOSINOPHIL NFR BLD: 2.2 %
ERYTHROCYTE [DISTWIDTH] IN BLOOD BY AUTOMATED COUNT: 17.1 %
EST. GFR  (AFRICAN AMERICAN): 4.6 ML/MIN/1.73 M^2
EST. GFR  (NON AFRICAN AMERICAN): 4 ML/MIN/1.73 M^2
GLUCOSE SERPL-MCNC: 136 MG/DL
HCT VFR BLD AUTO: 25.6 %
HGB BLD-MCNC: 8 G/DL
IMM GRANULOCYTES # BLD AUTO: 0.01 K/UL
IMM GRANULOCYTES NFR BLD AUTO: 0.3 %
LYMPHOCYTES # BLD AUTO: 0.9 K/UL
LYMPHOCYTES NFR BLD: 28.3 %
MAGNESIUM SERPL-MCNC: 2 MG/DL
MCH RBC QN AUTO: 29.4 PG
MCHC RBC AUTO-ENTMCNC: 31.3 G/DL
MCV RBC AUTO: 94 FL
MONOCYTES # BLD AUTO: 0.5 K/UL
MONOCYTES NFR BLD: 15.1 %
NEUTROPHILS # BLD AUTO: 1.7 K/UL
NEUTROPHILS NFR BLD: 53.5 %
NRBC BLD-RTO: 0 /100 WBC
PHOSPHATE SERPL-MCNC: 6.4 MG/DL
PLATELET # BLD AUTO: 168 K/UL
PMV BLD AUTO: 10.8 FL
POCT GLUCOSE: 121 MG/DL (ref 70–110)
POCT GLUCOSE: 133 MG/DL (ref 70–110)
POCT GLUCOSE: 142 MG/DL (ref 70–110)
POCT GLUCOSE: 164 MG/DL (ref 70–110)
POTASSIUM SERPL-SCNC: 5 MMOL/L
RBC # BLD AUTO: 2.72 M/UL
SODIUM SERPL-SCNC: 136 MMOL/L
WBC # BLD AUTO: 3.18 K/UL

## 2019-02-17 PROCEDURE — 99224 PR SUBSEQUENT OBSERVATION CARE,LEVEL I: CPT | Mod: ,,, | Performed by: PHYSICIAN ASSISTANT

## 2019-02-17 PROCEDURE — 99224 PR SUBSEQUENT OBSERVATION CARE,LEVEL I: ICD-10-PCS | Mod: ,,, | Performed by: PHYSICIAN ASSISTANT

## 2019-02-17 PROCEDURE — 83735 ASSAY OF MAGNESIUM: CPT

## 2019-02-17 PROCEDURE — G0378 HOSPITAL OBSERVATION PER HR: HCPCS

## 2019-02-17 PROCEDURE — 85025 COMPLETE CBC W/AUTO DIFF WBC: CPT

## 2019-02-17 PROCEDURE — 25000003 PHARM REV CODE 250: Performed by: PHYSICIAN ASSISTANT

## 2019-02-17 PROCEDURE — 80048 BASIC METABOLIC PNL TOTAL CA: CPT

## 2019-02-17 PROCEDURE — 82040 ASSAY OF SERUM ALBUMIN: CPT

## 2019-02-17 PROCEDURE — 63600175 PHARM REV CODE 636 W HCPCS: Performed by: INTERNAL MEDICINE

## 2019-02-17 PROCEDURE — 84100 ASSAY OF PHOSPHORUS: CPT

## 2019-02-17 PROCEDURE — 82962 GLUCOSE BLOOD TEST: CPT | Mod: 91

## 2019-02-17 PROCEDURE — 36415 COLL VENOUS BLD VENIPUNCTURE: CPT

## 2019-02-17 PROCEDURE — 25000003 PHARM REV CODE 250: Performed by: INTERNAL MEDICINE

## 2019-02-17 RX ORDER — SODIUM CHLORIDE 9 MG/ML
INJECTION, SOLUTION INTRAVENOUS ONCE
Status: COMPLETED | OUTPATIENT
Start: 2019-02-17 | End: 2019-02-18

## 2019-02-17 RX ORDER — POLYETHYLENE GLYCOL 3350 17 G/17G
17 POWDER, FOR SOLUTION ORAL DAILY
Status: DISCONTINUED | OUTPATIENT
Start: 2019-02-17 | End: 2019-02-20 | Stop reason: HOSPADM

## 2019-02-17 RX ORDER — SODIUM CHLORIDE 9 MG/ML
INJECTION, SOLUTION INTRAVENOUS
Status: DISCONTINUED | OUTPATIENT
Start: 2019-02-17 | End: 2019-02-19

## 2019-02-17 RX ORDER — CALCIUM ACETATE 667 MG/1
1334 CAPSULE ORAL
Status: DISCONTINUED | OUTPATIENT
Start: 2019-02-17 | End: 2019-02-20 | Stop reason: HOSPADM

## 2019-02-17 RX ORDER — BISACODYL 10 MG
10 SUPPOSITORY, RECTAL RECTAL DAILY PRN
Status: DISCONTINUED | OUTPATIENT
Start: 2019-02-17 | End: 2019-02-20 | Stop reason: HOSPADM

## 2019-02-17 RX ADMIN — AMLODIPINE BESYLATE 10 MG: 10 TABLET ORAL at 07:02

## 2019-02-17 RX ADMIN — CALCIUM ACETATE 1334 MG: 667 CAPSULE ORAL at 11:02

## 2019-02-17 RX ADMIN — HYDRALAZINE HYDROCHLORIDE 25 MG: 25 TABLET, FILM COATED ORAL at 09:02

## 2019-02-17 RX ADMIN — HEPARIN SODIUM 5000 UNITS: 5000 INJECTION, SOLUTION INTRAVENOUS; SUBCUTANEOUS at 09:02

## 2019-02-17 RX ADMIN — INSULIN ASPART 4 UNITS: 100 INJECTION, SOLUTION INTRAVENOUS; SUBCUTANEOUS at 11:02

## 2019-02-17 RX ADMIN — HEPARIN SODIUM 5000 UNITS: 5000 INJECTION, SOLUTION INTRAVENOUS; SUBCUTANEOUS at 07:02

## 2019-02-17 RX ADMIN — LETROZOLE 2.5 MG: 2.5 TABLET ORAL at 02:02

## 2019-02-17 RX ADMIN — INSULIN ASPART 4 UNITS: 100 INJECTION, SOLUTION INTRAVENOUS; SUBCUTANEOUS at 04:02

## 2019-02-17 RX ADMIN — INSULIN DETEMIR 30 UNITS: 100 INJECTION, SOLUTION SUBCUTANEOUS at 09:02

## 2019-02-17 RX ADMIN — ACETAMINOPHEN AND CODEINE PHOSPHATE 1 TABLET: 300; 30 TABLET ORAL at 09:02

## 2019-02-17 RX ADMIN — HYDRALAZINE HYDROCHLORIDE 25 MG: 25 TABLET, FILM COATED ORAL at 08:02

## 2019-02-17 RX ADMIN — CALCIUM ACETATE 1334 MG: 667 CAPSULE ORAL at 04:02

## 2019-02-17 RX ADMIN — STANDARDIZED SENNA CONCENTRATE AND DOCUSATE SODIUM 1 TABLET: 8.6; 5 TABLET, FILM COATED ORAL at 09:02

## 2019-02-17 RX ADMIN — CITALOPRAM HYDROBROMIDE 20 MG: 20 TABLET ORAL at 09:02

## 2019-02-17 RX ADMIN — Medication 1 CAPSULE: at 09:02

## 2019-02-17 RX ADMIN — INSULIN DETEMIR 30 UNITS: 100 INJECTION, SOLUTION SUBCUTANEOUS at 08:02

## 2019-02-17 RX ADMIN — INSULIN ASPART 4 UNITS: 100 INJECTION, SOLUTION INTRAVENOUS; SUBCUTANEOUS at 07:02

## 2019-02-17 RX ADMIN — CALCIUM ACETATE 667 MG: 667 CAPSULE ORAL at 08:02

## 2019-02-17 RX ADMIN — FOLIC ACID 1 MG: 1 TABLET ORAL at 08:02

## 2019-02-17 RX ADMIN — HEPARIN SODIUM 5000 UNITS: 5000 INJECTION, SOLUTION INTRAVENOUS; SUBCUTANEOUS at 02:02

## 2019-02-17 RX ADMIN — Medication 1 CAPSULE: at 08:02

## 2019-02-17 RX ADMIN — ACETAMINOPHEN AND CODEINE PHOSPHATE 1 TABLET: 300; 30 TABLET ORAL at 10:02

## 2019-02-17 RX ADMIN — CARVEDILOL 25 MG: 25 TABLET, FILM COATED ORAL at 08:02

## 2019-02-17 RX ADMIN — GABAPENTIN 400 MG: 100 CAPSULE ORAL at 09:02

## 2019-02-17 RX ADMIN — CARVEDILOL 25 MG: 25 TABLET, FILM COATED ORAL at 09:02

## 2019-02-17 RX ADMIN — STANDARDIZED SENNA CONCENTRATE AND DOCUSATE SODIUM 1 TABLET: 8.6; 5 TABLET, FILM COATED ORAL at 08:02

## 2019-02-17 RX ADMIN — Medication 150 MG: at 08:02

## 2019-02-17 RX ADMIN — ATORVASTATIN CALCIUM 40 MG: 20 TABLET, FILM COATED ORAL at 08:02

## 2019-02-17 NOTE — ASSESSMENT & PLAN NOTE
Anemia in ESRD    -Nephrology consult  -Pt dialyzed overnight, labs much improved this AM  -calcium acetate capsule 667 mg, 667 mg, Oral, TID WM  -ergocalciferol capsule 50,000 Units, 50,000 Units, Oral, Q7 Days  - Defer for Procrit to Nephro  - B12, Fe studies, folate ordered, see above  Hgb 7.8 this AM, started folate and niferix> Hgb 8.5>8

## 2019-02-17 NOTE — PLAN OF CARE
Problem: Adult Inpatient Plan of Care  Goal: Plan of Care Review  Outcome: Ongoing (interventions implemented as appropriate)  Safety mesures maintained this shift. VSS on 2L/O2 per NC. Heart monitor d/c per provider. BS monitored before meals. Scheduled insulin admin per order. Pain medication admin x 1. With moderate relief. Up to BSC with walker and min assistance. Bed in lowest locked position and call light within reach. NAD noted. Dialysis scheduled for tmrw morning. Will continue to monitor.

## 2019-02-17 NOTE — ASSESSMENT & PLAN NOTE
-amLODIPine tablet 10 mg, 10 mg, Oral, QAM  -carvedilol tablet 25 mg, 25 mg, Oral, BID  -hydrALAZINE tablet 25 mg, 25 mg, Oral, BID    BP stable, cont to monitor

## 2019-02-17 NOTE — ASSESSMENT & PLAN NOTE
-Ortho consult, no acute surgical intervention necessary  -Local cooling pack  -Brace as per PT or ortho

## 2019-02-17 NOTE — PROGRESS NOTES
"[facility] Hospital Medicine  Progress Note    Patient Name: Kylie King  MRN: [id number]  Patient Class: OP- Observation   Admission Date: 2/13/2019  Length of Stay: 0 days  Attending Physician: JIM Lilly MD  Primary Care Provider: Virginia Foote MD    [facility] Medicine Team: [facility] E Clarita Mooney PA-C    Subjective:     Principal Problem:Frequent falls    HPI:  Ms. King is a 64yo lady with multiple past medical problems (see below), who has ESRD on HD M/W/F due to longstanding uncontrolled DM2 and renovascular HTN.  She has always had "bad balance" due to her obesity, age and neuropathy, but over the past year she has noticed an up-tick in her falls.  Over the past 4 months this has almost doubled, and now over the past few weeks she has fallen 8 times.  Her problems first started with symptoms of right sided sciatica pain (sharp pain radiating down the back of her leg) with weakness.  This led to her guarding the right side with overuse of her left leg.  After a week or so of this and still falling, she suddenly noticed that her left leg would, "just suddenly give out" on her.  She would go to make a step and on her left foot touching, it was, "like the leg just didn't have any strength and down I'd go"  Her right knee is extremely painful to any pressure or movement and still with some sciatica symptoms.  She is now having difficulty ambulating.  She has some right buttock pain as well, but no harry lumbar back pain.  She did see her PCP and an Orthopedist for these symptoms.  She was given a Rx for Norflex, which did seem to assist some with the pain, but actually made her balance significantly worse, so she stopped it after 3 days.     Due to the poor balance and fear of using the steps, she missed her HD session on Monday.  She was planning to go today, but again fell and struck her knees and her right lower ribcage.  She feels, "a little knot there now."  To " complicate things, she also recently had cataract surgery (1/30/19), so this is impeding her vision to some degree (along with the dark sunglasses).    Hospital Course:  Pt admitted for L sided leg pain that has been so severe she has had multiple falls and missed her HD appointments. MRI brain shows no acute process to attribute frequent falls. Knee and hip xray show no acute fracture or dislocation. MRI lumbar shows mod/severe spinal canal stenosis. Pt not able to complete MRI pelvis secondary to pain/anxiety. Ortho consulted, no acute surgical intervention necessary per ortho/orthospine. PT evaluated pt, recommending rehab. Able to transfer to [facility] Monday.    Interval History: No acute events overnight. Pt resting comfortably in bed in NAD. Wheelchair and walker at bedside. Pt reports improvement in ambulation.     Review of Systems   Constitutional: Negative for chills and fever.   HENT: Negative for congestion.    Eyes: Positive for visual disturbance.        Blurred vision - pt with recent cataract surgery   Respiratory: Negative for cough and shortness of breath.    Cardiovascular: Negative for chest pain and palpitations.   Gastrointestinal: Negative for abdominal pain and vomiting.   Genitourinary: Negative for difficulty urinating and dysuria.   Musculoskeletal: Positive for arthralgias, back pain and gait problem.   Skin: Negative for color change.   Neurological: Positive for weakness (generalized). Negative for dizziness and syncope.   Psychiatric/Behavioral: Negative for agitation and confusion.     Objective:     Vital Signs (Most Recent):  Temp: 98.4 °F (36.9 °C) (02/17/19 1132)  Pulse: 70 (02/17/19 1204)  Resp: 16 (02/17/19 1132)  BP: 135/68 (02/17/19 1132)  SpO2: 96 % (02/17/19 1132) Vital Signs (24h Range):  Temp:  [98.4 °F (36.9 °C)-99.8 °F (37.7 °C)] 98.4 °F (36.9 °C)  Pulse:  [64-94] 70  Resp:  [14-20] 16  SpO2:  [96 %-98 %] 96 %  BP: (132-170)/(64-84) 135/68     Weight: 122.3 kg (269 lb 10  oz)  Body mass index is 46.28 kg/m².    Intake/Output Summary (Last 24 hours) at 2/17/2019 1228  Last data filed at 2/17/2019 0900  Gross per 24 hour   Intake 1040 ml   Output 450 ml   Net 590 ml      Physical Exam   Constitutional: She is oriented to person, place, and time. She appears well-developed and well-nourished. No distress.   HENT:   Head: Normocephalic and atraumatic.   Eyes: Conjunctivae are normal. Right eye exhibits no discharge. Left eye exhibits no discharge.   Neck: Normal range of motion.   Cardiovascular: Normal rate and regular rhythm.   Pulmonary/Chest: Effort normal and breath sounds normal. No respiratory distress. She has no wheezes.   Abdominal: Soft. Bowel sounds are normal. There is no tenderness.   Musculoskeletal: She exhibits edema (chronic venous stasis).   Limited range of motion in BLE, more so on R side than L   Neurological: She is alert and oriented to person, place, and time.   Skin: She is not diaphoretic.   Psychiatric: She has a normal mood and affect. Her behavior is normal.       Significant Labs: All pertinent labs within the past 24 hours have been reviewed.    Significant Imaging: I have reviewed all pertinent imaging results/findings within the past 24 hours.    Assessment/Plan:      * Frequent falls    -Check Vitamin D, B12, Folate: Vit D low, replaced; B12 in process; Folate low, supplemented  -MRI brain reveals no central cause  -Suspect multifactorial from:              -vision issues, medications, age, weight, neuropathy, sciatica and severe DJD knees/hips  -PT and OT consults; PT rec rehab; PMR consulted; pt agreeable to transfer to rehab monday  -Avoid benzo's and other psychotropic meds  -MRI of L spine as per below        Lumbar stenosis    R sided sciatica  -Check MRI pelvis and lumbo sacral area   Lumbar MRI shows mod-sev spinal canal stenosis without evidence of fx/dislocations   - pt unable to complete pelvic MRI 2/2 anxiety/ pain  -orphenadrine 12 hr  tablet 100 mg, 100 mg, Oral, BID PRN spasm of muscles    No surgical intervention warranted at this time per ortho/ortho spine Dr. Olivia, appreciate assistance       Acute pain of both knees    -Ortho consult, no acute surgical intervention necessary  -Local cooling pack  -Brace as per PT or ortho     ESRD (end stage renal disease) on dialysis    Anemia in ESRD    -Nephrology consult  -Pt dialyzed overnight, labs much improved this AM  -calcium acetate capsule 667 mg, 667 mg, Oral, TID WM  -ergocalciferol capsule 50,000 Units, 50,000 Units, Oral, Q7 Days  - Defer for Procrit to Nephro  - B12, Fe studies, folate ordered, see above  Hgb 7.8 this AM, started folate and niferix> Hgb 8.5>8     Hyperkalemia    Hyperpohosphatemia  -HD orders written already  -Telemetry    K WNL after dialysis, cont to monitor  2/17: increased calcium acetate today as phos continually above goal     DM type 2, uncontrolled, with neuropathy    -gabapentin capsule 400 mg, 400 mg, Oral, QHS  -insulin detemir U-100 pen 45 Units, 45 Units, Subcutaneous, BID  -Asp 9 units SQ AC with meals TID + SSI protocol    BG stable, cont to monitor  2/15:hypoglycemic low 60s; decreased detemir to 30U and aspart to 4U  2/16-17: BG at goal throughout the day     Secondary renovascular hypertension, malignant    -amLODIPine tablet 10 mg, 10 mg, Oral, QAM  -carvedilol tablet 25 mg, 25 mg, Oral, BID  -hydrALAZINE tablet 25 mg, 25 mg, Oral, BID    BP stable, cont to monitor       Hypercholesterolemia    -atorvastatin tablet 40 mg, 40 mg, Oral, Daily  -omega-3 fatty acids-fish oil 340-1,000 mg capsule 1 capsule, 1 capsule, Oral, BID       Depression    -citalopram tablet 20 mg, 20 mg, Oral, Nightly         History of breast cancer    -letrozole tablet 2.5 mg, 2.5 mg, Oral, Nightly         VTE Risk Mitigation (From admission, onward)        Ordered     heparin (porcine) injection 5,000 Units  Every 8 hours      02/13/19 1748     Place [facility] [place]  Until discontinued       02/13/19 1748     Place sequential compression device  Until discontinued      02/13/19 1748     IP VTE HIGH RISK PATIENT  Once      02/13/19 1748            Discussed with staff  Clarita Mooney PA-C  Department of Hospital Medicine   [facility]

## 2019-02-17 NOTE — ASSESSMENT & PLAN NOTE
-gabapentin capsule 400 mg, 400 mg, Oral, QHS  -insulin detemir U-100 pen 45 Units, 45 Units, Subcutaneous, BID  -Asp 9 units SQ AC with meals TID + SSI protocol    BG stable, cont to monitor  2/15:hypoglycemic low 60s; decreased detemir to 30U and aspart to 4U  2/16-17: BG at goal throughout the day

## 2019-02-17 NOTE — ASSESSMENT & PLAN NOTE
R sided sciatica  -Check MRI pelvis and lumbo sacral area   Lumbar MRI shows mod-sev spinal canal stenosis without evidence of fx/dislocations   - pt unable to complete pelvic MRI 2/2 anxiety/ pain  -orphenadrine 12 hr tablet 100 mg, 100 mg, Oral, BID PRN spasm of muscles    No surgical intervention warranted at this time per ortho/ortho spine Dr. Olivia, appreciate assistance

## 2019-02-17 NOTE — SUBJECTIVE & OBJECTIVE
Interval History: No acute events overnight. Pt resting comfortably in bed in NAD. Wheelchair and walker at bedside. Pt reports improvement in ambulation.     Review of Systems   Constitutional: Negative for chills and fever.   HENT: Negative for congestion.    Eyes: Positive for visual disturbance.        Blurred vision - pt with recent cataract surgery   Respiratory: Negative for cough and shortness of breath.    Cardiovascular: Negative for chest pain and palpitations.   Gastrointestinal: Negative for abdominal pain and vomiting.   Genitourinary: Negative for difficulty urinating and dysuria.   Musculoskeletal: Positive for arthralgias, back pain and gait problem.   Skin: Negative for color change.   Neurological: Positive for weakness (generalized). Negative for dizziness and syncope.   Psychiatric/Behavioral: Negative for agitation and confusion.     Objective:     Vital Signs (Most Recent):  Temp: 98.4 °F (36.9 °C) (02/17/19 1132)  Pulse: 70 (02/17/19 1204)  Resp: 16 (02/17/19 1132)  BP: 135/68 (02/17/19 1132)  SpO2: 96 % (02/17/19 1132) Vital Signs (24h Range):  Temp:  [98.4 °F (36.9 °C)-99.8 °F (37.7 °C)] 98.4 °F (36.9 °C)  Pulse:  [64-94] 70  Resp:  [14-20] 16  SpO2:  [96 %-98 %] 96 %  BP: (132-170)/(64-84) 135/68     Weight: 122.3 kg (269 lb 10 oz)  Body mass index is 46.28 kg/m².    Intake/Output Summary (Last 24 hours) at 2/17/2019 1228  Last data filed at 2/17/2019 0900  Gross per 24 hour   Intake 1040 ml   Output 450 ml   Net 590 ml      Physical Exam   Constitutional: She is oriented to person, place, and time. She appears well-developed and well-nourished. No distress.   HENT:   Head: Normocephalic and atraumatic.   Eyes: Conjunctivae are normal. Right eye exhibits no discharge. Left eye exhibits no discharge.   Neck: Normal range of motion.   Cardiovascular: Normal rate and regular rhythm.   Pulmonary/Chest: Effort normal and breath sounds normal. No respiratory distress. She has no wheezes.    Abdominal: Soft. Bowel sounds are normal. There is no tenderness.   Musculoskeletal: She exhibits edema (chronic venous stasis).   Limited range of motion in BLE, more so on R side than L   Neurological: She is alert and oriented to person, place, and time.   Skin: She is not diaphoretic.   Psychiatric: She has a normal mood and affect. Her behavior is normal.       Significant Labs: All pertinent labs within the past 24 hours have been reviewed.    Significant Imaging: I have reviewed all pertinent imaging results/findings within the past 24 hours.

## 2019-02-17 NOTE — ASSESSMENT & PLAN NOTE
Hyperpohosphatemia  -HD orders written already  -Telemetry    K WNL after dialysis, cont to monitor  2/17: increased calcium acetate today as phos continually above goal

## 2019-02-18 PROBLEM — Z74.09 IMPAIRED FUNCTIONAL MOBILITY AND ENDURANCE: Status: ACTIVE | Noted: 2019-02-18

## 2019-02-18 LAB
ALBUMIN SERPL BCP-MCNC: 3.1 G/DL
ANION GAP SERPL CALC-SCNC: 14 MMOL/L
BASOPHILS # BLD AUTO: 0.02 K/UL
BASOPHILS NFR BLD: 0.7 %
BUN SERPL-MCNC: 78 MG/DL
CALCIUM SERPL-MCNC: 8.6 MG/DL
CHLORIDE SERPL-SCNC: 104 MMOL/L
CO2 SERPL-SCNC: 19 MMOL/L
CREAT SERPL-MCNC: 10.8 MG/DL
DIFFERENTIAL METHOD: ABNORMAL
EOSINOPHIL # BLD AUTO: 0.1 K/UL
EOSINOPHIL NFR BLD: 2.7 %
ERYTHROCYTE [DISTWIDTH] IN BLOOD BY AUTOMATED COUNT: 17.4 %
EST. GFR  (AFRICAN AMERICAN): 3.9 ML/MIN/1.73 M^2
EST. GFR  (NON AFRICAN AMERICAN): 3.4 ML/MIN/1.73 M^2
GLUCOSE SERPL-MCNC: 91 MG/DL
HCT VFR BLD AUTO: 26.2 %
HGB BLD-MCNC: 7.8 G/DL
IMM GRANULOCYTES # BLD AUTO: 0.01 K/UL
IMM GRANULOCYTES NFR BLD AUTO: 0.3 %
LYMPHOCYTES # BLD AUTO: 1.1 K/UL
LYMPHOCYTES NFR BLD: 39.2 %
MAGNESIUM SERPL-MCNC: 2.1 MG/DL
MCH RBC QN AUTO: 29.7 PG
MCHC RBC AUTO-ENTMCNC: 29.8 G/DL
MCV RBC AUTO: 100 FL
MONOCYTES # BLD AUTO: 0.5 K/UL
MONOCYTES NFR BLD: 15.8 %
NEUTROPHILS # BLD AUTO: 1.2 K/UL
NEUTROPHILS NFR BLD: 41.3 %
NRBC BLD-RTO: 0 /100 WBC
PHOSPHATE SERPL-MCNC: 7.4 MG/DL
PLATELET # BLD AUTO: 160 K/UL
PMV BLD AUTO: 10.9 FL
POCT GLUCOSE: 103 MG/DL (ref 70–110)
POCT GLUCOSE: 109 MG/DL (ref 70–110)
POCT GLUCOSE: 110 MG/DL (ref 70–110)
POCT GLUCOSE: 144 MG/DL (ref 70–110)
POTASSIUM SERPL-SCNC: 5.4 MMOL/L
RBC # BLD AUTO: 2.63 M/UL
SODIUM SERPL-SCNC: 137 MMOL/L
WBC # BLD AUTO: 2.91 K/UL

## 2019-02-18 PROCEDURE — 90935 HEMODIALYSIS ONE EVALUATION: CPT | Mod: ,,, | Performed by: NURSE PRACTITIONER

## 2019-02-18 PROCEDURE — 83735 ASSAY OF MAGNESIUM: CPT

## 2019-02-18 PROCEDURE — 25000003 PHARM REV CODE 250: Performed by: INTERNAL MEDICINE

## 2019-02-18 PROCEDURE — 85025 COMPLETE CBC W/AUTO DIFF WBC: CPT

## 2019-02-18 PROCEDURE — 36415 COLL VENOUS BLD VENIPUNCTURE: CPT

## 2019-02-18 PROCEDURE — G0378 HOSPITAL OBSERVATION PER HR: HCPCS

## 2019-02-18 PROCEDURE — 63600175 PHARM REV CODE 636 W HCPCS: Performed by: PHYSICIAN ASSISTANT

## 2019-02-18 PROCEDURE — 99225 PR SUBSEQUENT OBSERVATION CARE,LEVEL II: CPT | Mod: ,,, | Performed by: PHYSICIAN ASSISTANT

## 2019-02-18 PROCEDURE — 25000003 PHARM REV CODE 250: Performed by: PHYSICIAN ASSISTANT

## 2019-02-18 PROCEDURE — S5571 INSULIN DISPOS PEN 3 ML: HCPCS | Performed by: PHYSICIAN ASSISTANT

## 2019-02-18 PROCEDURE — 63600175 PHARM REV CODE 636 W HCPCS: Performed by: INTERNAL MEDICINE

## 2019-02-18 PROCEDURE — 84100 ASSAY OF PHOSPHORUS: CPT

## 2019-02-18 PROCEDURE — 80048 BASIC METABOLIC PNL TOTAL CA: CPT

## 2019-02-18 PROCEDURE — 90935 HEMODIALYSIS ONE EVALUATION: CPT

## 2019-02-18 PROCEDURE — 90935 PR HEMODIALYSIS, ONE EVALUATION: ICD-10-PCS | Mod: ,,, | Performed by: NURSE PRACTITIONER

## 2019-02-18 PROCEDURE — 99252 PR INITIAL INPATIENT CONSULT,LEVL II: ICD-10-PCS | Mod: ,,, | Performed by: NURSE PRACTITIONER

## 2019-02-18 PROCEDURE — 99225 PR SUBSEQUENT OBSERVATION CARE,LEVEL II: ICD-10-PCS | Mod: ,,, | Performed by: PHYSICIAN ASSISTANT

## 2019-02-18 PROCEDURE — 99252 IP/OBS CONSLTJ NEW/EST SF 35: CPT | Mod: ,,, | Performed by: NURSE PRACTITIONER

## 2019-02-18 PROCEDURE — G0257 UNSCHED DIALYSIS ESRD PT HOS: HCPCS

## 2019-02-18 PROCEDURE — 82040 ASSAY OF SERUM ALBUMIN: CPT

## 2019-02-18 RX ORDER — LIDOCAINE 50 MG/G
1 PATCH TOPICAL
Status: DISCONTINUED | OUTPATIENT
Start: 2019-02-18 | End: 2019-02-20 | Stop reason: HOSPADM

## 2019-02-18 RX ADMIN — INSULIN ASPART 4 UNITS: 100 INJECTION, SOLUTION INTRAVENOUS; SUBCUTANEOUS at 05:02

## 2019-02-18 RX ADMIN — GABAPENTIN 400 MG: 100 CAPSULE ORAL at 09:02

## 2019-02-18 RX ADMIN — INSULIN DETEMIR 30 UNITS: 100 INJECTION, SOLUTION SUBCUTANEOUS at 12:02

## 2019-02-18 RX ADMIN — CARVEDILOL 25 MG: 25 TABLET, FILM COATED ORAL at 09:02

## 2019-02-18 RX ADMIN — AMLODIPINE BESYLATE 10 MG: 10 TABLET ORAL at 06:02

## 2019-02-18 RX ADMIN — Medication 1 CAPSULE: at 11:02

## 2019-02-18 RX ADMIN — HEPARIN SODIUM 5000 UNITS: 5000 INJECTION, SOLUTION INTRAVENOUS; SUBCUTANEOUS at 06:02

## 2019-02-18 RX ADMIN — CARVEDILOL 25 MG: 25 TABLET, FILM COATED ORAL at 12:02

## 2019-02-18 RX ADMIN — INSULIN DETEMIR 30 UNITS: 100 INJECTION, SOLUTION SUBCUTANEOUS at 10:02

## 2019-02-18 RX ADMIN — Medication 1 CAPSULE: at 09:02

## 2019-02-18 RX ADMIN — HEPARIN SODIUM 5000 UNITS: 5000 INJECTION, SOLUTION INTRAVENOUS; SUBCUTANEOUS at 09:02

## 2019-02-18 RX ADMIN — ERGOCALCIFEROL 50000 UNITS: 1.25 CAPSULE ORAL at 11:02

## 2019-02-18 RX ADMIN — ATORVASTATIN CALCIUM 40 MG: 20 TABLET, FILM COATED ORAL at 11:02

## 2019-02-18 RX ADMIN — INSULIN ASPART 4 UNITS: 100 INJECTION, SOLUTION INTRAVENOUS; SUBCUTANEOUS at 12:02

## 2019-02-18 RX ADMIN — STANDARDIZED SENNA CONCENTRATE AND DOCUSATE SODIUM 1 TABLET: 8.6; 5 TABLET, FILM COATED ORAL at 11:02

## 2019-02-18 RX ADMIN — LETROZOLE 2.5 MG: 2.5 TABLET ORAL at 04:02

## 2019-02-18 RX ADMIN — CALCIUM ACETATE 1334 MG: 667 CAPSULE ORAL at 08:02

## 2019-02-18 RX ADMIN — LIDOCAINE 1 PATCH: 50 PATCH TOPICAL at 02:02

## 2019-02-18 RX ADMIN — BISACODYL 10 MG: 10 SUPPOSITORY RECTAL at 12:02

## 2019-02-18 RX ADMIN — CALCIUM ACETATE 1334 MG: 667 CAPSULE ORAL at 05:02

## 2019-02-18 RX ADMIN — CALCIUM ACETATE 1334 MG: 667 CAPSULE ORAL at 12:02

## 2019-02-18 RX ADMIN — HEPARIN SODIUM 5000 UNITS: 5000 INJECTION, SOLUTION INTRAVENOUS; SUBCUTANEOUS at 02:02

## 2019-02-18 RX ADMIN — LETROZOLE 2.5 MG: 2.5 TABLET ORAL at 12:02

## 2019-02-18 RX ADMIN — Medication 150 MG: at 11:02

## 2019-02-18 RX ADMIN — FOLIC ACID 1 MG: 1 TABLET ORAL at 11:02

## 2019-02-18 RX ADMIN — INSULIN ASPART 4 UNITS: 100 INJECTION, SOLUTION INTRAVENOUS; SUBCUTANEOUS at 08:02

## 2019-02-18 RX ADMIN — HYDRALAZINE HYDROCHLORIDE 25 MG: 25 TABLET, FILM COATED ORAL at 09:02

## 2019-02-18 RX ADMIN — HYDRALAZINE HYDROCHLORIDE 25 MG: 25 TABLET, FILM COATED ORAL at 11:02

## 2019-02-18 RX ADMIN — SODIUM CHLORIDE 300 ML: 0.9 INJECTION, SOLUTION INTRAVENOUS at 08:02

## 2019-02-18 RX ADMIN — CITALOPRAM HYDROBROMIDE 20 MG: 20 TABLET ORAL at 09:02

## 2019-02-18 RX ADMIN — ACETAMINOPHEN AND CODEINE PHOSPHATE 1 TABLET: 300; 30 TABLET ORAL at 06:02

## 2019-02-18 NOTE — NURSING
Received pt back from dialysis. She is awake and alert; slight shortness of breath on 2 L NC. Rehab NP coming to evaluate pt .She states she has back pain but does not want antispasmodic at this time. She stated she will wait until she can have tylenol with codeine.

## 2019-02-18 NOTE — ASSESSMENT & PLAN NOTE
-MRI brain reveals no acute path  -vision issues, medications, age, weight, neuropathy, sciatica and severe DJD knees/hips  -MRI L spine with moderate-severe spinal canal stenosis without evidence of fracture/dislocations  -orphenadrine 12 hr tablet 100 mg, 100 mg, Oral, BID PRN spasm of muscles  -No surgical intervention warranted at this time per ortho

## 2019-02-18 NOTE — PROGRESS NOTES
"[facility] Hospital Medicine  Progress Note    Patient Name: Kylie King  MRN: [id number]  Patient Class: OP- Observation   Admission Date: 2/13/2019  Length of Stay: 0 days  Attending Physician: JIM Lilly MD  Primary Care Provider: Virginia Foote MD    [facility] Medicine Team: [facility] HOSP MED E Clarita Mooney PA-C    Subjective:     Principal Problem:Frequent falls    HPI:  Ms. King is a 64yo lady with multiple past medical problems (see below), who has ESRD on HD M/W/F due to longstanding uncontrolled DM2 and renovascular HTN.  She has always had "bad balance" due to her obesity, age and neuropathy, but over the past year she has noticed an up-tick in her falls.  Over the past 4 months this has almost doubled, and now over the past few weeks she has fallen 8 times.  Her problems first started with symptoms of right sided sciatica pain (sharp pain radiating down the back of her leg) with weakness.  This led to her guarding the right side with overuse of her left leg.  After a week or so of this and still falling, she suddenly noticed that her left leg would, "just suddenly give out" on her.  She would go to make a step and on her left foot touching, it was, "like the leg just didn't have any strength and down I'd go"  Her right knee is extremely painful to any pressure or movement and still with some sciatica symptoms.  She is now having difficulty ambulating.  She has some right buttock pain as well, but no harry lumbar back pain.  She did see her PCP and an Orthopedist for these symptoms.  She was given a Rx for Norflex, which did seem to assist some with the pain, but actually made her balance significantly worse, so she stopped it after 3 days.     Due to the poor balance and fear of using the steps, she missed her HD session on Monday.  She was planning to go today, but again fell and struck her knees and her right lower ribcage.  She feels, "a little knot there now."  To " complicate things, she also recently had cataract surgery (1/30/19), so this is impeding her vision to some degree (along with the dark sunglasses).    Hospital Course:  Pt admitted for L sided leg pain that has been so severe she has had multiple falls and missed her HD appointments. MRI brain shows no acute process to attribute frequent falls. Knee and hip xray show no acute fracture or dislocation. MRI lumbar shows mod/severe spinal canal stenosis. Pt not able to complete MRI pelvis secondary to pain/anxiety. Ortho consulted, no acute surgical intervention necessary per ortho/orthospine. PT evaluated pt, recommending rehab. Was initially approved to go to rehab, however incorrect insurance was on file. Re-submitting with correct insurance.    Interval History: no acute events overnight. Pt taken to HD this AM, tolerated well. Awaiting placement.    Review of Systems   Constitutional: Negative for chills and fever.   HENT: Negative for congestion.    Eyes: Positive for visual disturbance.        Blurred vision - pt with recent cataract surgery   Respiratory: Negative for cough and shortness of breath.    Cardiovascular: Negative for chest pain and palpitations.   Gastrointestinal: Negative for abdominal pain and vomiting.   Genitourinary: Negative for difficulty urinating and dysuria.   Musculoskeletal: Positive for arthralgias, back pain and gait problem.   Skin: Negative for color change.   Neurological: Positive for weakness (generalized). Negative for dizziness and syncope.   Psychiatric/Behavioral: Negative for agitation and confusion.     Objective:     Vital Signs (Most Recent):  Temp: 99.2 °F (37.3 °C) (02/18/19 1123)  Pulse: 76 (02/18/19 1123)  Resp: 20 (02/18/19 1123)  BP: (!) 168/77 (02/18/19 1123)  SpO2: 96 % (02/18/19 1123) Vital Signs (24h Range):  Temp:  [97 °F (36.1 °C)-100.1 °F (37.8 °C)] 99.2 °F (37.3 °C)  Pulse:  [65-78] 76  Resp:  [16-20] 20  SpO2:  [96 %-99 %] 96 %  BP: (123-182)/(52-86)  168/77     Weight: 122.3 kg (269 lb 10 oz)  Body mass index is 46.28 kg/m².    Intake/Output Summary (Last 24 hours) at 2/18/2019 1313  Last data filed at 2/18/2019 1040  Gross per 24 hour   Intake 780 ml   Output 2600 ml   Net -1820 ml      Physical Exam   Constitutional: She is oriented to person, place, and time. She appears well-developed and well-nourished. No distress.   HENT:   Head: Normocephalic and atraumatic.   Eyes: Conjunctivae are normal. Right eye exhibits no discharge. Left eye exhibits no discharge.   Neck: Normal range of motion.   Cardiovascular: Normal rate and regular rhythm.   Pulmonary/Chest: Effort normal and breath sounds normal. No respiratory distress. She has no wheezes.   Abdominal: Soft. Bowel sounds are normal. There is no tenderness.   Musculoskeletal: She exhibits edema (chronic venous stasis).   Limited range of motion in BLE, more so on R side than L   Neurological: She is alert and oriented to person, place, and time.   Skin: She is not diaphoretic.   Psychiatric: She has a normal mood and affect. Her behavior is normal.       Significant Labs: All pertinent labs within the past 24 hours have been reviewed.    Significant Imaging: I have reviewed all pertinent imaging results/findings within the past 24 hours.    Assessment/Plan:      * Frequent falls    -Check Vitamin D, B12, Folate: Vit D low, replaced; B12 in process; Folate low, supplemented  -MRI brain reveals no central cause  -Suspect multifactorial from:              -vision issues, medications, age, weight, neuropathy, sciatica and severe DJD knees/hips  -PT and OT consults; PT rec rehab; PMR consulted; placement in process  -Avoid benzo's and other psychotropic meds  -MRI of L spine as per below        Lumbar stenosis    R sided sciatica  -Check MRI pelvis and lumbo sacral area   Lumbar MRI shows mod-sev spinal canal stenosis without evidence of fx/dislocations   - pt unable to complete pelvic MRI 2/2 anxiety/  pain  -orphenadrine 12 hr tablet 100 mg, 100 mg, Oral, BID PRN spasm of muscles    No surgical intervention warranted at this time per ortho/ortho spine Dr. Olivia, appreciate assistance       Acute pain of both knees    -Ortho consult, no acute surgical intervention necessary  -Local cooling pack  -Brace as per PT or ortho     ESRD (end stage renal disease) on dialysis    Anemia in ESRD    -Nephrology consult  -Pt dialyzed overnight, labs much improved this AM  -calcium acetate capsule 667 mg, 667 mg, Oral, TID WM  -ergocalciferol capsule 50,000 Units, 50,000 Units, Oral, Q7 Days  - Defer for Procrit to Nephro  - B12, Fe studies, folate ordered, see above  Hgb 7.8 this AM, started folate and niferix> Hgb 8.5>8     Hyperkalemia    Hyperpohosphatemia  -HD orders written already  -Telemetry    K WNL after dialysis, cont to monitor  2/17: increased calcium acetate today as phos continually above goal     DM type 2, uncontrolled, with neuropathy    -gabapentin capsule 400 mg, 400 mg, Oral, QHS  -insulin detemir U-100 pen 45 Units, 45 Units, Subcutaneous, BID  -Asp 9 units SQ AC with meals TID + SSI protocol    BG stable, cont to monitor  2/15:hypoglycemic low 60s; decreased detemir to 30U and aspart to 4U  2/16-18: BG at goal throughout the day     Secondary renovascular hypertension, malignant    -amLODIPine tablet 10 mg, 10 mg, Oral, QAM  -carvedilol tablet 25 mg, 25 mg, Oral, BID  -hydrALAZINE tablet 25 mg, 25 mg, Oral, BID    BP stable, cont to monitor       Hypercholesterolemia    -atorvastatin tablet 40 mg, 40 mg, Oral, Daily  -omega-3 fatty acids-fish oil 340-1,000 mg capsule 1 capsule, 1 capsule, Oral, BID       Depression    -citalopram tablet 20 mg, 20 mg, Oral, Nightly         History of breast cancer    -letrozole tablet 2.5 mg, 2.5 mg, Oral, Nightly         VTE Risk Mitigation (From admission, onward)        Ordered     heparin (porcine) injection 5,000 Units  Every 8 hours      02/13/19 1748     Place  MIKEL hose  Until discontinued      02/13/19 1748     Place sequential compression device  Until discontinued      02/13/19 1748     IP VTE HIGH RISK PATIENT  Once      02/13/19 1748              Clarita Mooney PA-C  Department of Hospital Medicine   [facility]

## 2019-02-18 NOTE — SUBJECTIVE & OBJECTIVE
Interval History: no acute events overnight. Pt taken to HD this AM, tolerated well. Awaiting placement.    Review of Systems   Constitutional: Negative for chills and fever.   HENT: Negative for congestion.    Eyes: Positive for visual disturbance.        Blurred vision - pt with recent cataract surgery   Respiratory: Negative for cough and shortness of breath.    Cardiovascular: Negative for chest pain and palpitations.   Gastrointestinal: Negative for abdominal pain and vomiting.   Genitourinary: Negative for difficulty urinating and dysuria.   Musculoskeletal: Positive for arthralgias, back pain and gait problem.   Skin: Negative for color change.   Neurological: Positive for weakness (generalized). Negative for dizziness and syncope.   Psychiatric/Behavioral: Negative for agitation and confusion.     Objective:     Vital Signs (Most Recent):  Temp: 99.2 °F (37.3 °C) (02/18/19 1123)  Pulse: 76 (02/18/19 1123)  Resp: 20 (02/18/19 1123)  BP: (!) 168/77 (02/18/19 1123)  SpO2: 96 % (02/18/19 1123) Vital Signs (24h Range):  Temp:  [97 °F (36.1 °C)-100.1 °F (37.8 °C)] 99.2 °F (37.3 °C)  Pulse:  [65-78] 76  Resp:  [16-20] 20  SpO2:  [96 %-99 %] 96 %  BP: (123-182)/(52-86) 168/77     Weight: 122.3 kg (269 lb 10 oz)  Body mass index is 46.28 kg/m².    Intake/Output Summary (Last 24 hours) at 2/18/2019 1313  Last data filed at 2/18/2019 1040  Gross per 24 hour   Intake 780 ml   Output 2600 ml   Net -1820 ml      Physical Exam   Constitutional: She is oriented to person, place, and time. She appears well-developed and well-nourished. No distress.   HENT:   Head: Normocephalic and atraumatic.   Eyes: Conjunctivae are normal. Right eye exhibits no discharge. Left eye exhibits no discharge.   Neck: Normal range of motion.   Cardiovascular: Normal rate and regular rhythm.   Pulmonary/Chest: Effort normal and breath sounds normal. No respiratory distress. She has no wheezes.   Abdominal: Soft. Bowel sounds are normal. There is  no tenderness.   Musculoskeletal: She exhibits edema (chronic venous stasis).   Limited range of motion in BLE, more so on R side than L   Neurological: She is alert and oriented to person, place, and time.   Skin: She is not diaphoretic.   Psychiatric: She has a normal mood and affect. Her behavior is normal.       Significant Labs: All pertinent labs within the past 24 hours have been reviewed.    Significant Imaging: I have reviewed all pertinent imaging results/findings within the past 24 hours.

## 2019-02-18 NOTE — PROGRESS NOTES
Dialysis completed. Needles removed from right forearm fistula with pressure held to sites for 10 minutes each with hemostasis achieved. Gauze and tape to sites. Patient dialyzed for 3 hours with fluid removal of 2 liters.Tolerated well with stable vital signs. Report to RINKU Gomes.

## 2019-02-18 NOTE — PROGRESS NOTES
Patient received from floor in own bed. Maintenance dialysis began per orders via 15 gauge fistula needles to right forearm fistula.

## 2019-02-18 NOTE — PLAN OF CARE
"   02/18/19 1227   Post-Acute Status   Post-Acute Authorization Placement   Post-Acute Placement Status Referrals Sent       [facility] following for placement.     SHILPI informed by Kody with [facility] that pt primary insurance is [employer]. Pt primary initially listed as Medicare.  A request for rehab auth has been submitted to [facility] for approval.     SW met with pt to discuss rehab placement.  SW informed pt that the team was awaiting auth for transfer.  Pt informed SW that she was informed by "someone" yesterday that she had been approved for rehab.  SW explained that the pt was accepted by the rehab but insurance auth was still needed.           Jigna He, MSW, [facility]  [facility]  [id number]    "

## 2019-02-18 NOTE — ASSESSMENT & PLAN NOTE
-gabapentin capsule 400 mg, 400 mg, Oral, QHS  -insulin detemir U-100 pen 45 Units, 45 Units, Subcutaneous, BID  -Asp 9 units SQ AC with meals TID + SSI protocol    BG stable, cont to monitor  2/15:hypoglycemic low 60s; decreased detemir to 30U and aspart to 4U  2/16-18: BG at goal throughout the day

## 2019-02-18 NOTE — SUBJECTIVE & OBJECTIVE
Past Medical History:   Diagnosis Date    Anxiety     Arthritis     DDD, Lumbar    Breast cancer 1/2013    left breast- invasive mammary carcinoma, ER/UT positive, Her2 negative    Carotid artery stenosis 8/13/2018 6/22/2018: Carotid Duplex: SHANELL: Moderate plaquing - 2.0 m/s - <50%. LICA: Moderate plaquing - 1.6 m/s - <50%.    Cataract     Choledocholithiasis     s/p ERCP and stent placement    Chronic obstructive pulmonary disease 6/8/2018    Chronic venous insufficiency     Colon cancer 2001    CRI (chronic renal insufficiency)     one kidney    Dialysis AV fistula malfunction     ESRD (end stage renal disease) on dialysis     Former smoker 6/8/2018    GERD (gastroesophageal reflux disease)     History of acute pancreatitis 2009 2/09 s/p sphincterotomy    History of cerebrovascular accident 6/8/2018    History of colon cancer     s/p sigmoid colectomy    HTN (hypertension), benign     Hypercholesterolemia     Hyperlipidemia     Hypoxemia 6/8/2018    Intracerebral hemorrhage     Kidney stone     left    Lymphedema of both lower extremities     Obesity     Pancreatitis     Pancreatitis 2008    Physical deconditioning     Pulmonary edema     Sacroiliac joint pain     Spinal stenosis     Spinal stenosis, lumbar     Stroke 12/2012    Tobacco abuse     Type 2 diabetes mellitus with neurological manifestations, controlled     Neuropathy     Past Surgical History:   Procedure Laterality Date    AMPUTATION-TOE, partial Left 11/25/2016    Performed by Jose Delacruz Jr., DPM at [facility] OR 2ND FLR    BIOPSY, LYMPH NODE, SENTINEL Left 2/25/2013    Performed by Dirk Oneil MD at [facility] OR 2ND FLR    BLOCK-NERVE-MEDIAL BRANCH-LUMBAR Bilateral 3/1/2016    [facility] at [facility]    BLOCK-NERVE-MEDIAL BRANCH-LUMBAR Bilateral 2/23/2016    Per[facility] by [facility] at [facility]    BREAST BIOPSY  1/2012    left breast invasive mammary  carcinoma (lateral bx) and intraductal papilloma (medial bx)    BREAST BIOPSY  1999    rt breast FA    CHOLECYSTECTOMY  2001    COLON SURGERY      CREATION -FISTULA-AV Right 5/28/2018    Performed by RICK Pollard III, MD at [facility] OR 2ND FLR    EXTRACTION, CATARACT, WITH IOL INSERTION Left 1/31/2019    Performed by Immanuel Moncada MD at [facility] [place]    EXTRACTION, CATARACT, WITH IOL INSERTION Right 1/24/2019    Performed by Immanuel Moncada MD at [facility] [place]    HERNIA REPAIR      INJECTION, FOR SENTINEL LYMPH NODE IDENTIFICATION Left 2/25/2013    Performed by Dirk Oneil MD at [facility] OR 2ND FLR    INJECTION-JOINT Bilateral 1/20/2016    Performed by Stacie Negrete MD at [facility] [place] PAIN MGT    INSERTION, TISSUE EXPANDER, BREAST Left 2/25/2013    Performed by Terry Moreno MD at [facility] OR 2ND FLR    INSERTION-CATHETER-DIALYSIS Right 4/23/2018    Performed by Jenae Salguero DO at [facility] OR    INSERTION-CATHETER-BRENNEN CATH - C-ARM N/A 11/11/2016    Performed by Bobby Blount Jr., MD at [facility] [place]    left nephrectomy      atrophic kidney and hydronephrosis    left oopherectomy  2001    MASTECTOMY  2013    left    MASTECTOMY Left 2/25/2013    Performed by Dirk Oneil MD at [facility] OR 2ND FLR    MASTOPEXY Right 2/25/2013    Performed by Terry Moreno MD at [facility] OR 2ND FLR    NEPHRECTOMY  2011    lap    RADIOFREQUENCY THERMOCOAGULATION (RFTC)-NERVE-MEDIAN BRANCH-LUMBAR Left 3/30/2016    Performed by Stacie Negrete MD at [facility] [place] PAIN MGT    RADIOFREQUENCY THERMOCOAGULATION (RFTC)-NERVE-MEDIAN BRANCH-LUMBAR Right 3/16/2016    Performed by Stacie Negrete MD at [facility] [place] PAIN MGT    RECONSTRUCTION, BREAST, USING LATISSIMUS DORSI MYOCUTANEOUS FLAP Left 4/11/2013    Performed by Terry Moreno MD at [facility] OR 2ND FLR    RECONSTRUCTION-NIPPLE Left 1/9/2014    Performed by Terry Moreno MD at [facility] OR 2ND FLR    REPAIR, HERNIA, VENTRAL, LAPAROSCOPIC N/A 3/28/2014    Performed by  Min Polanco MD at [facility] OR 2ND FLR    REVISION Bilateral 1/9/2014    Performed by Terry Moreno MD at [facility] OR 2ND FLR    REVISION, AV FISTULA Right 8/15/2018    Performed by RICK Pollard III, MD at [facility] OR 2ND FLR    SIGMOIDECTOMY  2001    TOTAL REDUCTION MAMMOPLASTY Right 2013    TRANSPOSITION, VEIN Right 8/15/2018    Performed by RICK Polalrd III, MD at [facility] OR 2ND FLR     Review of patient's allergies indicates:   Allergen Reactions    Cyclobenzaprine Hallucinations    Erythromycin      Stomach upset    Keflex [cephalexin]      Yeast infection       Scheduled Medications:    amLODIPine  10 mg Oral QAM    atorvastatin  40 mg Oral Daily    calcium acetate  1,334 mg Oral TID WM    carvedilol  25 mg Oral BID    citalopram  20 mg Oral Nightly    cyanocobalamin  1,000 mcg Subcutaneous Once    ergocalciferol  50,000 Units Oral Q7 Days    fluticasone  2 spray Each Nare Daily    folic acid  1 mg Oral Daily    gabapentin  400 mg Oral QHS    heparin (porcine)  5,000 Units Subcutaneous Q8H    hydrALAZINE  25 mg Oral BID    insulin aspart U-100  4 Units Subcutaneous TIDWM    insulin detemir U-100  30 Units Subcutaneous BID    iron polysaccharides  150 mg Oral Daily    letrozole  2.5 mg Oral Nightly    omega-3 fatty acids-fish oil  1 capsule Oral BID    polyethylene glycol  17 g Oral Daily    senna-docusate 8.6-50 mg  1 tablet Oral BID       PRN Medications: sodium chloride 0.9%, sodium chloride 0.9%, sodium chloride 0.9%, acetaminophen, acetaminophen-codeine 300-30mg, albuterol-ipratropium, bisacodyl, dextrose 50%, dextrose 50%, glucagon (human recombinant), glucose, glucose, insulin aspart U-100, ondansetron, orphenadrine, promethazine, ramelteon, sodium chloride 0.9%    Family History     Problem Relation (Age of Onset)    Arthritis Mother    Breast cancer Maternal Grandmother    Cancer Maternal Grandmother, Maternal Aunt    Celiac disease Sister    Diabetes Father     Heart disease Mother, Father, Maternal Grandmother        Tobacco Use    Smoking status: Current Some Day Smoker     Packs/day: 0.50     Years: 28.00     Pack years: 14.00     Types: Cigarettes     Start date: 1990     Last attempt to quit: 2018     Years since quittin.1    Smokeless tobacco: Never Used    Tobacco comment: anxious   Substance and Sexual Activity    Alcohol use: No    Drug use: No    Sexual activity: No     Partners: Male     Review of Systems   Constitutional: Positive for activity change and fatigue. Negative for fever.   HENT: Negative for trouble swallowing and voice change.    Respiratory: Positive for shortness of breath. Negative for cough.    Cardiovascular: Negative for chest pain and palpitations.   Gastrointestinal: Negative for nausea and vomiting.   Genitourinary: Negative for difficulty urinating and flank pain.   Musculoskeletal: Positive for gait problem. Negative for arthralgias.   Skin: Positive for color change.   Neurological: Positive for weakness. Negative for numbness.   Psychiatric/Behavioral: Negative for agitation and confusion.     Objective:     Vital Signs (Most Recent):  Temp: 99.2 °F (37.3 °C) (19 1123)  Pulse: 76 (19 1123)  Resp: 20 (19 1123)  BP: (!) 168/77 (19 1123)  SpO2: 96 % (19 1123)    Vital Signs (24h Range):  Temp:  [97 °F (36.1 °C)-100.1 °F (37.8 °C)] 99.2 °F (37.3 °C)  Pulse:  [65-78] 76  Resp:  [16-20] 20  SpO2:  [96 %-99 %] 96 %  BP: (123-182)/(52-86) 168/77     Body mass index is 46.28 kg/m².    Physical Exam   Constitutional: She is oriented to person, place, and time. She appears well-developed.   obese   HENT:   Head: Normocephalic and atraumatic.   Eyes: Right eye exhibits no discharge. Left eye exhibits no discharge.   Neck: Neck supple.   Cardiovascular: Intact distal pulses.   Pulmonary/Chest:   02 via NC  Mild respiratory effort noted with pursed lip breathing    Abdominal: Soft. There is no  tenderness.   Musculoskeletal:        Lumbar back: She exhibits decreased range of motion and tenderness.   Neurological: She is alert and oriented to person, place, and time.   Skin: Skin is warm and dry.   RLE redness/skin changes    Psychiatric: She has a normal mood and affect. Her behavior is normal.   Vitals reviewed.    NEUROLOGICAL EXAMINATION:     MENTAL STATUS   Oriented to person, place, and time.       Diagnostic Results:   Labs: Reviewed  ECG: Reviewed  X-Ray: Reviewed

## 2019-02-18 NOTE — PT/OT/SLP PROGRESS
Physical Therapy      Patient Name:  Kylie King   MRN:  [id number]    Patient not seen today secondary to Dialysis, unable to attempt in PM. Will follow-up per POC.    Iván Petersen, PT

## 2019-02-18 NOTE — PROGRESS NOTES
[facility] H[facility]ODIAL[facility]IS NOTE     Patient currently on hemodialysis for removal of uremic toxins and volume.     Patient seen and evaluated on hemodialysis, tolerating treatment, see HD flowsheet for vitals and assessments.    No Hypotension, chest pain, shortness of breath, cramping, nausea or vomiting.       Ultrafiltration goal is 2 L as tolerated, keeping MAP >65.      Labs have been reviewed and the dialysate bath has been adjusted.     Assessment/Plan:  Seen on dialysis this morning, tolerating session with current UFR, no complications.    Hyperkalemic (5.4), potassium bath adjusted   Patient pending discharge to SNF today  Will continue MWF dialysis treatments while in-patient    Anemia of ESRD  Hgb 7.8, patient down trending since admit   Will review chronic care plan from outpatient dialysis center for CATY dosing if not discharged today.   Will start Epo with next treatment if remains inpatient.      BMM  Continue renal diet  Novasource with meals  Phos 7.4, continue calcium acetate with meals   Daily renal function panel       Aleja Rowley DNP, APRN, FNP-C  Nephrology Department  Pager:  [phone]

## 2019-02-18 NOTE — ASSESSMENT & PLAN NOTE
Recommendations  -  Encourage mobility, OOB in chair, and early ambulation as appropriate  -  PT/OT evaluate and treat  -  Pain management  -  DVT prophylaxis  -  Monitor for and prevent skin breakdown and pressure ulcers  · Early mobility, repositioning/weight shifting every 20-30 minutes when sitting, turn patient every 2 hours, proper mattress/overlay and chair cushioning, pressure relief/heel protector boots

## 2019-02-18 NOTE — PLAN OF CARE
02/18/19 1510   Discharge Reassessment   Assessment Type Discharge Planning Reassessment   Do you have any problems affording any of your prescribed medications? No   Discharge Plan A Rehab   Discharge Plan B Skilled Nursing Facility   Anticipated Discharge Disposition Rehab   How does the patient rate their overall health at the present time? Fair   Describe the patient's ability to walk at the present time. Major restrictions/daily assistance from another person   How often would a person be available to care for the patient? Whenever needed   Number of comorbid conditions (as recorded on the chart) Five or more     Patient has been accepted to [facility]. Awaiting insurance authorization from [facility]. Will continue to follow.

## 2019-02-18 NOTE — CONSULTS
[facility]  Physical Medicine & Rehab  Consult Note    Patient Name: Kylie King  MRN: [id number]  Admission Date: 2/13/2019  Hospital Length of Stay: 0 days  Attending Physician: JIM Lilly MD     Inpatient consult to Physical Medicine & Rehabilitation  Consult performed by: Carmen Llamas NP  Consult requested by:  JIM Lilly MD    Reason for Consult:  assess rehabilitation needs  Consults  Subjective:     Principal Problem: Frequent falls    HPI: Kylie King is a 63-year-old female with PMHx of HTN, DM2, ESRD (on HD), CAD, Breast CA, gallstones (s/p previous stent placement),  Pancreatitis, ICH, kidney stones, lymphedema of BLE, pulmonary edema, lumbar stenosis, & OA  Patient presented to [facility] on 2/13 with frequent falls with RLE pain/sciatica symptoms (8 falls over the past few weeks). MRI brain without acute pathology, revealed remote pontine hemorrhage and remote microhemorrhages within the right thalamus and bilateral basal ganglia. Bilateral knee and hip xrays and revealed significant joint space narrowing. MRI L-spine with significant degenerative change of the lumbar spine most pronounced at the L4-L5 where there is moderate/severe spinal canal stenosis and mild bilateral neural foraminal narrowing. Unable to complete MRI pelvis 2/2 anxiety.  No emergent surgical intervention required but future elective surgery may be pending. Brace to B/l knees. Hospital course complicated by HTN.     Functional History: Patient lives in [place] alone in a single story home with 5 steps to enter.  Prior to admission, (I) with ADLs and mobility with WC in house and electric scooter in community. DME: none.     Hospital Course: 02/14/2019: Evaluated by PT.  Bed mobility Mod-MaxA.  Sit to stand MaxA with RW. Stretcher MaxA x 2 ppl.    02/15/2019: Evaluated by OT.  Bed mobility Rosario.  Sit to stand ModA & RW.  Ambulated ~6-10 steps ModA & RW.  UBD Rosario.    Past Medical History:    Diagnosis Date    Anxiety     Arthritis     DDD, Lumbar    Breast cancer 1/2013    left breast- invasive mammary carcinoma, ER/AR positive, Her2 negative    Carotid artery stenosis 8/13/2018 6/22/2018: Carotid Duplex: SHANELL: Moderate plaquing - 2.0 m/s - <50%. LICA: Moderate plaquing - 1.6 m/s - <50%.    Cataract     Choledocholithiasis     s/p ERCP and stent placement    Chronic obstructive pulmonary disease 6/8/2018    Chronic venous insufficiency     Colon cancer 2001    CRI (chronic renal insufficiency)     one kidney    Dialysis AV fistula malfunction     ESRD (end stage renal disease) on dialysis     Former smoker 6/8/2018    GERD (gastroesophageal reflux disease)     History of acute pancreatitis 2009 2/09 s/p sphincterotomy    History of cerebrovascular accident 6/8/2018    History of colon cancer     s/p sigmoid colectomy    HTN (hypertension), benign     Hypercholesterolemia     Hyperlipidemia     Hypoxemia 6/8/2018    Intracerebral hemorrhage     Kidney stone     left    Lymphedema of both lower extremities     Obesity     Pancreatitis     Pancreatitis 2008    Physical deconditioning     Pulmonary edema     Sacroiliac joint pain     Spinal stenosis     Spinal stenosis, lumbar     Stroke 12/2012    Tobacco abuse     Type 2 diabetes mellitus with neurological manifestations, controlled     Neuropathy     Past Surgical History:   Procedure Laterality Date    AMPUTATION-TOE, partial Left 11/25/2016    Performed by Jose Delacruz Jr., DPM at [facility] OR 2ND FLR    BIOPSY, LYMPH NODE, SENTINEL Left 2/25/2013    Performed by Dirk Oneil MD at [facility] OR 2ND FLR    BLOCK-NERVE-MEDIAL BRANCH-LUMBAR Bilateral 3/1/2016    Performed by Stacie Negrete MD at [facility] PAIN MGT    BLOCK-NERVE-MEDIAL BRANCH-LUMBAR Bilateral 2/23/2016    Performed by [facility]ie Neg[facility]te MD at [facility] PAIN MGT    BREAST BIOPSY  1/2012    left breast invasive mammary carcinoma (lateral bx) and  intraductal papilloma (medial bx)    BREAST BIOPSY  1999    rt breast FA    CHOLECYSTECTOMY  2001    COLON SURGERY      CREATION -FISTULA-AV Right 5/28/2018    Performed by RICK Pollard III, MD at [facility] OR 2ND FLR    EXTRACTION, CATARACT, WITH IOL INSERTION Left 1/31/2019    Performed by Immaneul Moncada MD at [facility] OR    EXTRACTION, CATARACT, WITH IOL INSERTION Right 1/24/2019    Performed by Immanuel Moncada MD at [facility] OR    HERNIA REPAIR      INJECTION, FOR SENTINEL LYMPH NODE IDENTIFICATION Left 2/25/2013    Performed by Dirk Oneil MD at [facility] OR 2ND FLR    INJECTION-JOINT Bilateral 1/20/2016    Performed by Stacie Negrete MD at [facility] PAIN MGT    INSERTION, TISSUE EXPANDER, BREAST Left 2/25/2013    Performed by Terry Moreno MD at [facility] OR 2ND FLR    INSERTION-CATHETER-DIALYSIS Right 4/23/2018    Performed by Jenae Salguero DO at [facility]    INSERTION-CATHETER-BRENNEN CATH - C-ARM N/A 11/11/2016    Performed by Bobby Blount Jr., MD at [facility] OR    left nephrectomy      atrophic kidney and hydronephrosis    left oopherectomy  2001    MASTECTOMY  2013    left    MASTECTOMY Left 2/25/2013    Performed by Dirk Oneil MD at [facility] OR 2ND FLR    MASTOPEXY Right 2/25/2013    Performed by Terry Moreno MD at [facility] OR 2ND FLR    NEPHRECTOMY  2011    lap    RADIOFREQUENCY THERMOCOAGULATION (RFTC)-NERVE-MEDIAN BRANCH-LUMBAR Left 3/30/2016    Performed by Stacie Negrete MD at [facility] PAIN MGT    RADIOFREQUENCY THERMOCOAGULATION (RFTC)-NERVE-MEDIAN BRANCH-LUMBAR Right 3/16/2016    Performed by [facility]acie Negrete MD at [facility] MGT    RECONSTRUCTION, BREAST, USING LATISSIMUS DORSI MYOCUTANEOUS FLAP Left 4/11/2013    Performed by Terry Moreno MD at [facility] OR 2ND FLR    RECONSTRUCTION-NIPPLE Left 1/9/2014    Performed by Terry Moreno MD at [facility] OR 2ND FLR    REPAIR, HERNIA, VENTRAL, LAPAROSCOPIC N/A 3/28/2014    Performed by Min Polanco MD at  [facility] 2ND FLR    REVISION Bilateral 1/9/2014    Performed by Terry Moreno MD at [facility] 2ND FLR    REVISION, AV FISTULA Right 8/15/2018    Performed by RICK Pollard III, MD at [facility] OR 2ND FLR    SIGMOIDECTOMY  2001    TOTAL REDUCTION MAMMOPLASTY Right 2013    TRANSPOSITION, VEIN Right 8/15/2018    Performed by RICK Pollard III, MD at [facility] OR 2ND FLR     Review of patient's allergies indicates:   Allergen Reactions    Cyclobenzaprine Hallucinations    Erythromycin      Stomach upset    Keflex [cephalexin]      Yeast infection       Scheduled Medications:    amLODIPine  10 mg Oral QAM    atorvastatin  40 mg Oral Daily    calcium acetate  1,334 mg Oral TID WM    carvedilol  25 mg Oral BID    citalopram  20 mg Oral Nightly    cyanocobalamin  1,000 mcg Subcutaneous Once    ergocalciferol  50,000 Units Oral Q7 Days    fluticasone  2 spray Each Nare Daily    folic acid  1 mg Oral Daily    gabapentin  400 mg Oral QHS    heparin (porcine)  5,000 Units Subcutaneous Q8H    hydrALAZINE  25 mg Oral BID    insulin aspart U-100  4 Units Subcutaneous TIDWM    insulin detemir U-100  30 Units Subcutaneous BID    iron polysaccharides  150 mg Oral Daily    letrozole  2.5 mg Oral Nightly    omega-3 fatty acids-fish oil  1 capsule Oral BID    polyethylene glycol  17 g Oral Daily    senna-docusate 8.6-50 mg  1 tablet Oral BID       PRN Medications: sodium chloride 0.9%, sodium chloride 0.9%, sodium chloride 0.9%, acetaminophen, acetaminophen-codeine 300-30mg, albuterol-ipratropium, bisacodyl, dextrose 50%, dextrose 50%, glucagon (human recombinant), glucose, glucose, insulin aspart U-100, ondansetron, orphenadrine, promethazine, ramelteon, sodium chloride 0.9%    Family History     Problem Relation (Age of Onset)    Arthritis Mother    Breast cancer Maternal Grandmother    Cancer Maternal Grandmother, Maternal Aunt    Celiac disease Sister    Diabetes Father    Heart disease Mother, Father,  Maternal Grandmother        Tobacco Use    Smoking status: Current Some Day Smoker     Packs/day: 0.50     Years: 28.00     Pack years: 14.00     Types: Cigarettes     Start date: 1990     Last attempt to quit: 2018     Years since quittin.1    Smokeless tobacco: Never Used    Tobacco comment: anxious   Substance and Sexual Activity    Alcohol use: No    Drug use: No    Sexual activity: No     Partners: Male     Review of Systems   Constitutional: Positive for activity change and fatigue. Negative for fever.   HENT: Negative for trouble swallowing and voice change.    Respiratory: Positive for shortness of breath. Negative for cough.    Cardiovascular: Negative for chest pain and palpitations.   Gastrointestinal: Negative for nausea and vomiting.   Genitourinary: Negative for difficulty urinating and flank pain.   Musculoskeletal: Positive for gait problem. Negative for arthralgias.   Skin: Positive for color change.   Neurological: Positive for weakness. Negative for numbness.   Psychiatric/Behavioral: Negative for agitation and confusion.     Objective:     Vital Signs (Most Recent):  Temp: 99.2 °F (37.3 °C) (19 1123)  Pulse: 76 (19 1123)  Resp: 20 (19 1123)  BP: (!) 168/77 (19 1123)  SpO2: 96 % (19 1123)    Vital Signs (24h Range):  Temp:  [97 °F (36.1 °C)-100.1 °F (37.8 °C)] 99.2 °F (37.3 °C)  Pulse:  [65-78] 76  Resp:  [16-20] 20  SpO2:  [96 %-99 %] 96 %  BP: (123-182)/(52-86) 168/77     Body mass index is 46.28 kg/m².    Physical Exam   Constitutional: She is oriented to person, place, and time. She appears well-developed.   obese   HENT:   Head: Normocephalic and atraumatic.   Eyes: Right eye exhibits no discharge. Left eye exhibits no discharge.   Neck: Neck supple.   Cardiovascular: Intact distal pulses.   Pulmonary/Chest:   02 via NC  Mild respiratory effort noted with pursed lip breathing    Abdominal: Soft. There is no tenderness.   Musculoskeletal:         Lumbar back: She exhibits decreased range of motion and tenderness.   Neurological: She is alert and oriented to person, place, and time.   Skin: Skin is warm and dry.   RLE redness/skin changes    Psychiatric: She has a normal mood and affect. Her behavior is normal.   Vitals reviewed.      Diagnostic Results:   Labs: Reviewed  ECG: Reviewed  X-Ray: Reviewed    Assessment/Plan:     * Frequent falls    -MRI brain reveals no acute path  -vision issues, medications, age, weight, neuropathy, sciatica and severe DJD knees/hips  -MRI L spine with moderate-severe spinal canal stenosis without evidence of fracture/dislocations  -orphenadrine 12 hr tablet 100 mg, 100 mg, Oral, BID PRN spasm of muscles  -No surgical intervention warranted at this time per ortho     Impaired functional mobility and endurance     Recommendations  -  Encourage mobility, OOB in chair, and early ambulation as appropriate  -  PT/OT evaluate and treat  -  Pain management  -  DVT prophylaxis  -  Monitor for and prevent skin breakdown and pressure ulcers  · Early mobility, repositioning/weight shifting every 20-30 minutes when sitting, turn patient every 2 hours, proper mattress/overlay and chair cushioning, pressure relief/heel protector boots         ESRD on hemodialysis    -on HD MWF     Lumbar stenosis    -see frequent falls      Recommend Inpatient Rehab.  IRF preference per patient/[facility].         Thank you for your consult.     Carmen Llamas NP  Department of Physical Medicine & Rehab  [facility]

## 2019-02-18 NOTE — PLAN OF CARE
Problem: Adult Inpatient Plan of Care  Goal: Plan of Care Review  Outcome: Ongoing (interventions implemented as appropriate)  Pt awaiting approval to transfer to rehab. She is becoming frustrated with process; allowed time to voice her concerns.  She had dialysis procedure today. Lidocaine patch has been applied to (R) lower chest for c/o pain. Will continue to assess.

## 2019-02-18 NOTE — ASSESSMENT & PLAN NOTE
-Check Vitamin D, B12, Folate: Vit D low, replaced; B12 in process; Folate low, supplemented  -MRI brain reveals no central cause  -Suspect multifactorial from:              -vision issues, medications, age, weight, neuropathy, sciatica and severe DJD knees/hips  -PT and OT consults; PT rec rehab; PMR consulted; placement in process  -Avoid benzo's and other psychotropic meds  -MRI of L spine as per below

## 2019-02-19 PROBLEM — J06.9 UPPER RESPIRATORY INFECTION, ACUTE: Status: ACTIVE | Noted: 2019-02-19

## 2019-02-19 LAB
ALBUMIN SERPL BCP-MCNC: 3 G/DL
ANION GAP SERPL CALC-SCNC: 9 MMOL/L
BASOPHILS # BLD AUTO: 0.02 K/UL
BASOPHILS NFR BLD: 0.4 %
BUN SERPL-MCNC: 48 MG/DL
CALCIUM SERPL-MCNC: 8.9 MG/DL
CHLORIDE SERPL-SCNC: 96 MMOL/L
CO2 SERPL-SCNC: 28 MMOL/L
CREAT SERPL-MCNC: 7.5 MG/DL
DIFFERENTIAL METHOD: ABNORMAL
EOSINOPHIL # BLD AUTO: 0.1 K/UL
EOSINOPHIL NFR BLD: 1.2 %
ERYTHROCYTE [DISTWIDTH] IN BLOOD BY AUTOMATED COUNT: 17.4 %
EST. GFR  (AFRICAN AMERICAN): 6 ML/MIN/1.73 M^2
EST. GFR  (NON AFRICAN AMERICAN): 5.2 ML/MIN/1.73 M^2
GLUCOSE SERPL-MCNC: 99 MG/DL
HCT VFR BLD AUTO: 25 %
HGB BLD-MCNC: 7.7 G/DL
IMM GRANULOCYTES # BLD AUTO: 0.02 K/UL
IMM GRANULOCYTES NFR BLD AUTO: 0.4 %
INFLUENZA A, MOLECULAR: NEGATIVE
INFLUENZA B, MOLECULAR: NEGATIVE
LYMPHOCYTES # BLD AUTO: 1.1 K/UL
LYMPHOCYTES NFR BLD: 23.5 %
MAGNESIUM SERPL-MCNC: 2 MG/DL
MCH RBC QN AUTO: 30.2 PG
MCHC RBC AUTO-ENTMCNC: 30.8 G/DL
MCV RBC AUTO: 98 FL
METHYLMALONATE SERPL-SCNC: 1.49 UMOL/L
MONOCYTES # BLD AUTO: 0.5 K/UL
MONOCYTES NFR BLD: 10.5 %
NEUTROPHILS # BLD AUTO: 3.1 K/UL
NEUTROPHILS NFR BLD: 64 %
NRBC BLD-RTO: 0 /100 WBC
PHOSPHATE SERPL-MCNC: 6.1 MG/DL
PLATELET # BLD AUTO: 151 K/UL
PMV BLD AUTO: 10.8 FL
POCT GLUCOSE: 116 MG/DL (ref 70–110)
POCT GLUCOSE: 125 MG/DL (ref 70–110)
POCT GLUCOSE: 127 MG/DL (ref 70–110)
POCT GLUCOSE: 82 MG/DL (ref 70–110)
POCT GLUCOSE: 91 MG/DL (ref 70–110)
POTASSIUM SERPL-SCNC: 4.8 MMOL/L
PROCALCITONIN SERPL IA-MCNC: 0.33 NG/ML
RBC # BLD AUTO: 2.55 M/UL
SODIUM SERPL-SCNC: 133 MMOL/L
SPECIMEN SOURCE: NORMAL
WBC # BLD AUTO: 4.85 K/UL

## 2019-02-19 PROCEDURE — 84145 PROCALCITONIN (PCT): CPT

## 2019-02-19 PROCEDURE — 25000003 PHARM REV CODE 250: Performed by: PHYSICIAN ASSISTANT

## 2019-02-19 PROCEDURE — 99226 PR SUBSEQUENT OBSERVATION CARE,LEVEL III: ICD-10-PCS | Mod: ,,, | Performed by: PHYSICIAN ASSISTANT

## 2019-02-19 PROCEDURE — 87040 BLOOD CULTURE FOR BACTERIA: CPT | Mod: 59

## 2019-02-19 PROCEDURE — 25000003 PHARM REV CODE 250: Performed by: INTERNAL MEDICINE

## 2019-02-19 PROCEDURE — 99226 PR SUBSEQUENT OBSERVATION CARE,LEVEL III: CPT | Mod: ,,, | Performed by: PHYSICIAN ASSISTANT

## 2019-02-19 PROCEDURE — 83735 ASSAY OF MAGNESIUM: CPT

## 2019-02-19 PROCEDURE — 97535 SELF CARE MNGMENT TRAINING: CPT

## 2019-02-19 PROCEDURE — 97116 GAIT TRAINING THERAPY: CPT

## 2019-02-19 PROCEDURE — 99232 SBSQ HOSP IP/OBS MODERATE 35: CPT | Mod: ,,, | Performed by: NURSE PRACTITIONER

## 2019-02-19 PROCEDURE — 84100 ASSAY OF PHOSPHORUS: CPT

## 2019-02-19 PROCEDURE — 80048 BASIC METABOLIC PNL TOTAL CA: CPT

## 2019-02-19 PROCEDURE — 97110 THERAPEUTIC EXERCISES: CPT

## 2019-02-19 PROCEDURE — 82040 ASSAY OF SERUM ALBUMIN: CPT

## 2019-02-19 PROCEDURE — 99232 PR SUBSEQUENT HOSPITAL CARE,LEVL II: ICD-10-PCS | Mod: ,,, | Performed by: NURSE PRACTITIONER

## 2019-02-19 PROCEDURE — 85025 COMPLETE CBC W/AUTO DIFF WBC: CPT

## 2019-02-19 PROCEDURE — 87632 RESP VIRUS 6-11 TARGETS: CPT

## 2019-02-19 PROCEDURE — 36415 COLL VENOUS BLD VENIPUNCTURE: CPT

## 2019-02-19 PROCEDURE — 94761 N-INVAS EAR/PLS OXIMETRY MLT: CPT

## 2019-02-19 PROCEDURE — G0378 HOSPITAL OBSERVATION PER HR: HCPCS

## 2019-02-19 PROCEDURE — 63600175 PHARM REV CODE 636 W HCPCS: Performed by: INTERNAL MEDICINE

## 2019-02-19 PROCEDURE — 87502 INFLUENZA DNA AMP PROBE: CPT

## 2019-02-19 RX ORDER — SODIUM CHLORIDE 9 MG/ML
INJECTION, SOLUTION INTRAVENOUS ONCE
Status: COMPLETED | OUTPATIENT
Start: 2019-02-20 | End: 2019-02-20

## 2019-02-19 RX ORDER — OSELTAMIVIR PHOSPHATE 30 MG/1
30 CAPSULE ORAL
Status: DISCONTINUED | OUTPATIENT
Start: 2019-02-20 | End: 2019-02-20 | Stop reason: HOSPADM

## 2019-02-19 RX ORDER — OSELTAMIVIR PHOSPHATE 30 MG/1
30 CAPSULE ORAL ONCE
Status: COMPLETED | OUTPATIENT
Start: 2019-02-19 | End: 2019-02-19

## 2019-02-19 RX ADMIN — LETROZOLE 2.5 MG: 2.5 TABLET ORAL at 10:02

## 2019-02-19 RX ADMIN — INSULIN ASPART 4 UNITS: 100 INJECTION, SOLUTION INTRAVENOUS; SUBCUTANEOUS at 12:02

## 2019-02-19 RX ADMIN — HEPARIN SODIUM 5000 UNITS: 5000 INJECTION, SOLUTION INTRAVENOUS; SUBCUTANEOUS at 02:02

## 2019-02-19 RX ADMIN — INSULIN DETEMIR 30 UNITS: 100 INJECTION, SOLUTION SUBCUTANEOUS at 08:02

## 2019-02-19 RX ADMIN — CALCIUM ACETATE 1334 MG: 667 CAPSULE ORAL at 06:02

## 2019-02-19 RX ADMIN — ACETAMINOPHEN 500 MG: 500 TABLET ORAL at 12:02

## 2019-02-19 RX ADMIN — HEPARIN SODIUM 5000 UNITS: 5000 INJECTION, SOLUTION INTRAVENOUS; SUBCUTANEOUS at 06:02

## 2019-02-19 RX ADMIN — OSELTAMIVIR PHOSPHATE 30 MG: 30 CAPSULE ORAL at 02:02

## 2019-02-19 RX ADMIN — HYDRALAZINE HYDROCHLORIDE 25 MG: 25 TABLET, FILM COATED ORAL at 08:02

## 2019-02-19 RX ADMIN — Medication 1 CAPSULE: at 08:02

## 2019-02-19 RX ADMIN — STANDARDIZED SENNA CONCENTRATE AND DOCUSATE SODIUM 1 TABLET: 8.6; 5 TABLET, FILM COATED ORAL at 08:02

## 2019-02-19 RX ADMIN — ACETAMINOPHEN AND CODEINE PHOSPHATE 1 TABLET: 300; 30 TABLET ORAL at 10:02

## 2019-02-19 RX ADMIN — HYDRALAZINE HYDROCHLORIDE 25 MG: 25 TABLET, FILM COATED ORAL at 09:02

## 2019-02-19 RX ADMIN — CALCIUM ACETATE 1334 MG: 667 CAPSULE ORAL at 12:02

## 2019-02-19 RX ADMIN — ATORVASTATIN CALCIUM 40 MG: 20 TABLET, FILM COATED ORAL at 08:02

## 2019-02-19 RX ADMIN — LIDOCAINE 1 PATCH: 50 PATCH TOPICAL at 02:02

## 2019-02-19 RX ADMIN — CITALOPRAM HYDROBROMIDE 20 MG: 20 TABLET ORAL at 09:02

## 2019-02-19 RX ADMIN — INSULIN DETEMIR 30 UNITS: 100 INJECTION, SOLUTION SUBCUTANEOUS at 09:02

## 2019-02-19 RX ADMIN — FOLIC ACID 1 MG: 1 TABLET ORAL at 08:02

## 2019-02-19 RX ADMIN — POLYETHYLENE GLYCOL 3350 17 G: 17 POWDER, FOR SOLUTION ORAL at 08:02

## 2019-02-19 RX ADMIN — CARVEDILOL 25 MG: 25 TABLET, FILM COATED ORAL at 09:02

## 2019-02-19 RX ADMIN — AMLODIPINE BESYLATE 10 MG: 10 TABLET ORAL at 06:02

## 2019-02-19 RX ADMIN — Medication 150 MG: at 08:02

## 2019-02-19 RX ADMIN — CARVEDILOL 25 MG: 25 TABLET, FILM COATED ORAL at 08:02

## 2019-02-19 RX ADMIN — Medication 1 CAPSULE: at 09:02

## 2019-02-19 RX ADMIN — INSULIN ASPART 4 UNITS: 100 INJECTION, SOLUTION INTRAVENOUS; SUBCUTANEOUS at 06:02

## 2019-02-19 RX ADMIN — HEPARIN SODIUM 5000 UNITS: 5000 INJECTION, SOLUTION INTRAVENOUS; SUBCUTANEOUS at 10:02

## 2019-02-19 RX ADMIN — GABAPENTIN 400 MG: 100 CAPSULE ORAL at 09:02

## 2019-02-19 RX ADMIN — CALCIUM ACETATE 1334 MG: 667 CAPSULE ORAL at 08:02

## 2019-02-19 RX ADMIN — STANDARDIZED SENNA CONCENTRATE AND DOCUSATE SODIUM 1 TABLET: 8.6; 5 TABLET, FILM COATED ORAL at 09:02

## 2019-02-19 NOTE — ASSESSMENT & PLAN NOTE
- Pt with a fever (101.1) overnight. Currently afebrile.  - CXR with no acute processes.  - Procal mildly elevated (0.33).  - Rapid flu negative. Pt vaccinated but given constitutional symptoms (fever, cough, fatigue) will treat with Tamiflu for 5 days.  - Resp viral panel and blood cultures pending.

## 2019-02-19 NOTE — PT/OT/SLP PROGRESS
Occupational Therapy   Treatment    Name: Kylie King  MRN: [id number]  Admitting Diagnosis:  Frequent falls       Recommendations:     Discharge Recommendations: rehabilitation facility  Discharge Equipment Recommendations:  (TBD)  Barriers to discharge:  None    Assessment:     Kylie King is a 63 y.o. female with a medical diagnosis of Frequent falls.  She presents with R knee pain and balance deficits impairing prolonged mobility and self care. Pt able to stand at sink with UE support to complete self care. Ambulating with CGA for short distances with use of SW. Performance deficits affecting function are weakness, impaired endurance, impaired balance, gait instability, impaired self care skills, impaired functional mobilty, pain.     Rehab Prognosis:  Fair; patient would benefit from acute skilled OT services to address these deficits and reach maximum level of function.       Plan:     Patient to be seen 3 x/week to address the above listed problems via self-care/home management, therapeutic activities, therapeutic exercises  · Plan of Care Expires: 03/16/19  · Plan of Care Reviewed with: patient    Subjective     Pain/Comfort:  · Pain Rating 1: (Pt c/o R knee pain, however did not rate)  · Location - Side 1: Right  · Location - Orientation 1: generalized  · Location 1: knee  · Pain Addressed 1: Reposition, Distraction, Cessation of Activity, Nurse notified  · Pain Rating Post-Intervention 1: 0/10    Objective:     Communicated with: RN prior to session.  Patient found seated EOB with (oxygen) upon OT entry to room.    General Precautions: Standard, fall   Orthopedic Precautions:N/A   Braces: N/A     Occupational Performance:     Bed Mobility:    · Pt found EOB, returned [facility]    Functional Mobility/Transfers:  · Patient completed Sit <> Stand Transfer with contact guard assistance  with  standard walker   · Patient completed Bed <> Chair Transfer using Step Transfer technique with contact guard  assistance with standard walker  · Functional Mobility: Pt ambulate 12 ft + 6 ft with CGA using SW and with chair follow    Activities of Daily Living:  · Grooming: stand by assistance standing at sink to brush teeth and wash face  · Upper Body Dressing: setup assistance to don backward gown while seated EOB      [facility]    Treatment & Education:  Pt educated on role of OT/POC  Pt educated on importance of ambulation/UIC  Pt educated on importance of calling RN before getting OOB/out of chair   White board/communication board updated    Patient left up in chair with all lines intact, call button in reach and RN notifiedEducation:      GOALS:   Multidisciplinary Problems     Occupational Therapy Goals        Problem: Occupational Therapy Goal    Goal Priority Disciplines Outcome Interventions   Occupational Therapy Goal     OT, PT/OT Ongoing (interventions implemented as appropriate)    Description:  Goals to be met by: 2/23/19    Patient will increase functional independence with ADLs by performing:    UE Dressing with Stand-by Assistance. MET 2/19  LE Dressing with Minimal Assistance.  Grooming while standing at sink with Contact Guard Assistance. MET 2/19  Toileting from toilet with Minimal Assistance for hygiene and clothing management.   Supine to sit with Supervision.  Toilet transfer to bedside commode with Minimal Assistance.  Pt tolerate standing ~5 minutes to complete dynamic standing activity with CGA.                       Time Tracking:     OT Date of Treatment: 02/19/19  OT Start Time: 1300  OT Stop Time: 1318  OT Total Time (min): 18 min    Billable Minutes:Self Care/Home Management 18    Ana Delarosa OT  2/19/2019

## 2019-02-19 NOTE — PLAN OF CARE
Problem: Adult Inpatient Plan of Care  Goal: Plan of Care Review  Pt more active today and able to transfer from bed to commode with walker and standby assistance. (R) leg remains weaker than (L).  Earlier in the day she reported feeling achy and this was relieved by tylenol.  Continues to c/o pain below (R) ribs; lidocaine patch now in place. Presently sleeping on 2 L NC; respirations even and unlabored.

## 2019-02-19 NOTE — ASSESSMENT & PLAN NOTE
R sided sciatica  - Lumbar MRI shows mod-sev spinal canal stenosis without evidence of fx/dislocations  - Pt unable to complete pelvic MRI 2/2 anxiety/ pain  - Oorphenadrine 12 hr tablet 100 mg, 100 mg, Oral, BID PRN spasm of muscles  - No surgical intervention warranted at this time per ortho/ortho spine Dr. Olivia, appreciate assistance

## 2019-02-19 NOTE — ASSESSMENT & PLAN NOTE
- Atorvastatin tablet 40 mg, 40 mg, Oral, Daily  - Omega-3 fatty acids-fish oil 340-1,000 mg capsule 1 capsule, 1 capsule, Oral, BID

## 2019-02-19 NOTE — ASSESSMENT & PLAN NOTE
Anemia in ESRD    - Nephrology consult  - Pt dialyzed 2/18 with improvement in labs. Will receive HD tomorrow.  - Calcium acetate capsule 667 mg, 667 mg, Oral, TID WM  - Ergocalciferol capsule 50,000 Units, 50,000 Units, Oral, Q7 Days  - Defer for Procrit to Nephro  - B12, Fe studies, folate ordered, see above  - Hgb 7.7. Folate and niferix started 2/18 given Hgb 8.5>8>7.8

## 2019-02-19 NOTE — PT/OT/SLP PROGRESS
Physical Therapy Treatment    Patient Name:  Kylie King   MRN:  [id number]    Recommendations:     Discharge Recommendations:  rehabilitation facility   Discharge Equipment Recommendations: (TBD)   Barriers to discharge: Inaccessible home and Decreased caregiver support    Assessment:     Kylie King is a 63 y.o. female admitted with a medical diagnosis of Frequent falls.  She presents with the following impairments/functional limitations:  weakness, impaired endurance, gait instability, impaired functional mobilty, pain . Patient tolerated tx  well. Patient limited at this time by cont pain in R knee. Pt also states that her R knee was feeling weak during gait. Pt has improve and was able to gait x 12, x 14 , and 8 feet 3 separate trials with CGA using SW.   Patient will continue to require skilled PT services to address the above impairments to return to prior level of function as independent as possible.     Patient would benefit and can tolerate an inpatient rehabilitation therapy stay. Patient requires an inpatient rehabilitation  stay  to address the impairments listed above in a multidisciplinary, therapy intense setting. Skilled, intensive  therapy is needed  to facilitate patients return to prior level of function as independent as possible, decrease caregiver burden and reduce risk of falls.         Rehab Prognosis: Good; patient would benefit from acute skilled PT services to address these deficits and reach maximum level of function.    Recent Surgery: * No surgery found *      Plan:     During this hospitalization, patient to be seen 4 x/week to address the identified rehab impairments via gait training, therapeutic activities, therapeutic exercises, neuromuscular re-education and progress toward the following goals:    · Plan of Care Expires:  03/14/19    Subjective     Chief Complaint: pain in R knee; pt states she has had chronic sciatic  Patient/Family Comments/goals: to go to rehab/more  therapy  Pain/Comfort:  · Pain Rating 1: (pain in R knee; not rated)      Objective:     Communicated with RN  prior to session.  Patient found all lines intact and call button in reach (no active lines)  upon PT entry to room.     General Precautions: Standard, fall   Orthopedic Precautions:N/A   Braces: N/A     Functional Mobility:  · Bed Mobility:     · Transfers:  Sit to Stand:  contact guard assistance with standard walker  · Bed to Chair: contact guard assistance with  standard walker  using  Step Transfer  · Gait: x 12, 14, 8 feet with SW; decreased stance time/knee flexion R LE> L LE  · Balance: CGA for balance during EOB / gait with BUE support      AM-PAC 6 CLICK MOBILITY  Turning over in bed (including adjusting bedclothes, sheets and blankets)?: 4  Sitting down on and standing up from a chair with arms (e.g., wheelchair, bedside commode, etc.): 3  Moving from lying on back to sitting on the side of the bed?: 4  Moving to and from a bed to a chair (including a wheelchair)?: 3  Need to walk in hospital room?: 3  Climbing 3-5 steps with a railing?: 1  Basic Mobility Total Score: 18       Therapeutic Activities and Exercises:  · Standing x 2-3 minutes during TA with OT, SW and sink for UE support CGA  · Sit to stand x 4 trials from chair with CGA using SW  · LE stretching: HS stretch with emphasis on sciatic nerve extensibility 3 x 15 sec holds in chair   · Pt edu on sciatic pain/origin and pain mgmt options  · Pt able to demo LAQ, AP, marching and GS x 5; agrees to cont to perform when sitting EOB  · Pt propelled rolling chair bwd x ~30 feet with min A to improve quad strength/endurance BLE       Patient education  · Patient educated on the role of PT and POC  · Patient educated on importance  activity while in the hosptial per tolerance for improved endurance and to limit deconditioning   · Patient educated on safe transfers with nursing as appropriate  · Patient educated on energy conservation, pursed lip  breathing  · Patient educated on proper transfer mechanics and safety  · All of patients questions were answered within the scope of PT        Patient left up in chair with all lines intact, call button in reach and RN  notified..    GOALS:   Multidisciplinary Problems     Physical Therapy Goals        Problem: Physical Therapy Goal    Goal Priority Disciplines Outcome Goal Variances Interventions   Physical Therapy Goal     PT, PT/OT Ongoing (interventions implemented as appropriate)     Description:  Goals to be met by: 2/24/19     Patient will increase functional independence with mobility by performing:    Updated goals 2/ 19  1. Sit to stand with supervsion with LRD.   2. Bed to chair t/f with supervision with LRD.    3 .Gait x 35 feet with CGA using RW or LRD.   4. Ascend/descend 5 stair with bilateral Handrails Maximum Assistance using Rolling Walker or LRD.   5. Stand for 5 minutes with CGA using RW for UE support.      Previously Met and revised goals  2/19:  1. Supine to sit with Contact Guard Assistance- MET  2. Sit to supine with Contact Guard Assistance- MET  3. Sit to stand transfer with Moderate Assistance- MET  4.. Bed to chair transfer with Moderate Assistance using Rolling Walker or LRD.- MET  5. Gait  x 10 feet with Maximum Assistance using Rolling Walker or LRD.- MET  6. Stand for 2 minutes with Moderate Assistance using Rolling Walker or LRD. - MET                       Time Tracking:     PT Received On: 02/19/19  PT Start Time: 1300     PT Stop Time: 1329  PT Total Time (min): 29 min     Billable Minutes: Gait Training 10 min and Therapeutic Exercise 8 min    Treatment Type: Treatment  PT/PTA: PT     PTA Visit Number: 0     Iván Petersen, PT  02/19/2019

## 2019-02-19 NOTE — ASSESSMENT & PLAN NOTE
- Gabapentin capsule 400 mg, 400 mg, Oral, QHS  - Insulin detemir U-100 pen 45 Units, 45 Units, Subcutaneous, BID  - Asp 9 units SQ AC with meals TID + SSI protocol  - BG stable, cont to monitor  2/15:hypoglycemic low 60s; decreased detemir to 30U and aspart to 4U  2/16-19: BG at goal throughout the day

## 2019-02-19 NOTE — PROGRESS NOTES
"[facility] Hospital Medicine  Progress Note    Patient Name: Kylie King  MRN: [id number]  Patient Class: OP- Observation   Admission Date: 2/13/2019  Length of Stay: 0 days  Attending Physician: Cornelia Oliver MD  Primary Care Provider: Virginia Foote MD    [facility] Medicine Team: [facility] MED E Nuno Simons PA-C    Subjective:     Principal Problem:Frequent falls    HPI:  Ms. King is a 62yo lady with multiple past medical problems (see below), who has ESRD on HD M/W/F due to longstanding uncontrolled DM2 and renovascular HTN.  She has always had "bad balance" due to her obesity, age and neuropathy, but over the past year she has noticed an up-tick in her falls.  Over the past 4 months this has almost doubled, and now over the past few weeks she has fallen 8 times.  Her problems first started with symptoms of right sided sciatica pain (sharp pain radiating down the back of her leg) with weakness.  This led to her guarding the right side with overuse of her left leg.  After a week or so of this and still falling, she suddenly noticed that her left leg would, "just suddenly give out" on her.  She would go to make a step and on her left foot touching, it was, "like the leg just didn't have any strength and down I'd go"  Her right knee is extremely painful to any pressure or movement and still with some sciatica symptoms.  She is now having difficulty ambulating.  She has some right buttock pain as well, but no harry lumbar back pain.  She did see her PCP and an Orthopedist for these symptoms.  She was given a Rx for Norflex, which did seem to assist some with the pain, but actually made her balance significantly worse, so she stopped it after 3 days.     Due to the poor balance and fear of using the steps, she missed her HD session on Monday.  She was planning to go today, but again fell and struck her knees and her right lower ribcage.  She feels, "a little knot there now."  " To complicate things, she also recently had cataract surgery (1/30/19), so this is impeding her vision to some degree (along with the dark sunglasses).    Hospital Course:  Pt admitted for L sided leg pain that has been so severe she has had multiple falls and missed her HD appointments. MRI brain shows no acute process to attribute frequent falls. Knee and hip xray show no acute fracture or dislocation. MRI lumbar shows mod/severe spinal canal stenosis. Pt not able to complete MRI pelvis secondary to pain/anxiety. Ortho consulted, no acute surgical intervention necessary per ortho/orthospine. PT evaluated pt, recommending rehab. Pt approved but awaiting insurance authorization (incorrect insurance on file, resubmitted with correct insurance 2/18). In the interim, pt developed new onset fever (101.1) with persistent cough since admission. CXR with no acute pulmonary processes. Procal mildly elevated (0.33). Rapid flu negative. Resp viral panel and blood cultures pending. Tamiflu initiated given symptoms.    Interval History: Pt developed fever overnight (101.1). Admits to persistent cough since admission. Currently afebrile. Denies any fever, chills, NVD at time of interview. Updated pt on plan. Will discharge pending infectious workup and SNF insurance approval.    Review of Systems   Constitutional: Positive for fatigue and fever.   Respiratory: Positive for cough and shortness of breath. Negative for wheezing.    Cardiovascular: Negative for chest pain and leg swelling.   Gastrointestinal: Negative for abdominal pain, diarrhea, nausea and vomiting.   Genitourinary: Negative for dysuria (On HD but urinates 1/8- 1/4 cup 3x per week.) and hematuria.   Musculoskeletal: Positive for arthralgias (R knee), back pain (chronic), gait problem and myalgias (R sided MSK pain near inferior border of rib, exacerbated by deep breathing and movements).   Neurological: Positive for weakness (generalized). Negative for dizziness,  syncope, numbness and headaches.   Psychiatric/Behavioral: Negative for confusion.     Objective:     Vital Signs (Most Recent):  Temp: 98 °F (36.7 °C) (02/19/19 1147)  Pulse: 69 (02/19/19 1147)  Resp: 18 (02/19/19 1147)  BP: 120/60 (02/19/19 1147)  SpO2: 97 % (02/19/19 1200) Vital Signs (24h Range):  Temp:  [98 °F (36.7 °C)-101.1 °F (38.4 °C)] 98 °F (36.7 °C)  Pulse:  [69-81] 69  Resp:  [18-20] 18  SpO2:  [92 %-98 %] 97 %  BP: (117-163)/(53-77) 120/60     Weight: 122.9 kg (270 lb 15.1 oz)  Body mass index is 46.51 kg/m².    Intake/Output Summary (Last 24 hours) at 2/19/2019 1326  Last data filed at 2/19/2019 0830  Gross per 24 hour   Intake 540 ml   Output --   Net 540 ml      Physical Exam   Constitutional: She is oriented to person, place, and time. She appears well-developed and well-nourished.   HENT:   Head: Normocephalic and atraumatic.   Eyes: EOM are normal. Pupils are equal, round, and reactive to light.   Neck: Normal range of motion. Neck supple.   Cardiovascular: Normal rate, regular rhythm and normal heart sounds.   Pulmonary/Chest: Effort normal. She has no wheezes.   Pt on O2 during interview. No apparent SOB/dyspnea   Abdominal: Soft. Normal appearance and bowel sounds are normal. There is no tenderness.   Musculoskeletal: She exhibits edema (chronic venous stasis).        Right knee: She exhibits swelling. She exhibits no ecchymosis and no erythema. Tenderness found.        Left knee: Normal.   Neurological: She is alert and oriented to person, place, and time. No cranial nerve deficit.       Significant Labs:   BMP:   Recent Labs   Lab 02/19/19  0614   GLU 99   *   K 4.8   CL 96   CO2 28   BUN 48*   CREATININE 7.5*   CALCIUM 8.9   MG 2.0     CBC:   Recent Labs   Lab 02/18/19  0628 02/19/19  0614   WBC 2.91* 4.85   HGB 7.8* 7.7*   HCT 26.2* 25.0*    151     Magnesium:   Recent Labs   Lab 02/18/19  0628 02/19/19  0614   MG 2.1 2.0     POCT Glucose:   Recent Labs   Lab 02/19/19  0512  02/19/19  0817 02/19/19  1229   POCTGLUCOSE 82 91 125*     Rapid flu: Negative  Resp viral panel: pending  Blood cultures: pending    Significant Imaging: I have reviewed and interpreted all pertinent imaging results/findings within the past 24 hours.    Assessment/Plan:      * Frequent falls    - Check Vitamin D, B12, Folate: Vit D low, replaced; B12 in process; Folate low, supplemented  - MRI brain reveals no central cause  - Suspect multifactorial from:              -vision issues, medications, age, weight, neuropathy, sciatica and severe DJD knees/hips  - PT and OT consults; PT rec rehab; PMR consulted; placement in process  - Avoid benzo's and other psychotropic meds  - MRI of L spine as per below     Lumbar stenosis    R sided sciatica  - Lumbar MRI shows mod-sev spinal canal stenosis without evidence of fx/dislocations  - Pt unable to complete pelvic MRI 2/2 anxiety/ pain  - Oorphenadrine 12 hr tablet 100 mg, 100 mg, Oral, BID PRN spasm of muscles  - No surgical intervention warranted at this time per ortho/ortho spine Dr. Olivia, appreciate assistance     Upper respiratory infection, acute    - Pt with a fever (101.1) overnight. Currently afebrile.  - CXR with no acute processes.  - Procal mildly elevated (0.33).  - Rapid flu negative. Pt vaccinated but given constitutional symptoms (fever, cough, fatigue) will treat with Tamiflu for 5 days.  - Resp viral panel and blood cultures pending.     Acute pain of both knees    -Ortho consult, no acute surgical intervention necessary  -Local cooling pack  -Brace as per PT or ortho     ESRD (end stage renal disease) on dialysis    Anemia in ESRD    - Nephrology consult  - Pt dialyzed 2/18 with improvement in labs. Will receive HD tomorrow.  - Calcium acetate capsule 667 mg, 667 mg, Oral, TID WM  - Ergocalciferol capsule 50,000 Units, 50,000 Units, Oral, Q7 Days  - Defer for Procrit to Nephro  - B12, Fe studies, folate ordered, see above  - Hgb 7.7. Folate and  niferix started 2/18 given Hgb 8.5>8>7.8     Hyperkalemia    Hyperpohosphatemia  -HD orders written already  -Telemetry  - K WNL after dialysis, cont to monitor  - Calcium acetate increased on 2/17 as phos continually above goal. Phos trending down 7.4> 6.1 this AM     DM type 2, uncontrolled, with neuropathy    - Gabapentin capsule 400 mg, 400 mg, Oral, QHS  - Insulin detemir U-100 pen 45 Units, 45 Units, Subcutaneous, BID  - Asp 9 units SQ AC with meals TID + SSI protocol  - BG stable, cont to monitor  2/15:hypoglycemic low 60s; decreased detemir to 30U and aspart to 4U  2/16-19: BG at goal throughout the day     Secondary renovascular hypertension, malignant    - BP stable, will continue to monitor  - AmLODIPine tablet 10 mg, 10 mg, Oral, QAM  - Carvedilol tablet 25 mg, 25 mg, Oral, BID  - HydrALAZINE tablet 25 mg, 25 mg, Oral, BID     Hypercholesterolemia    - Atorvastatin tablet 40 mg, 40 mg, Oral, Daily  - Omega-3 fatty acids-fish oil 340-1,000 mg capsule 1 capsule, 1 capsule, Oral, BID     Depression    - Citalopram tablet 20 mg, 20 mg, Oral, Nightly     History of breast cancer    - Letrozole tablet 2.5 mg, 2.5 mg, Oral, Nightly       VTE Risk Mitigation (From admission, onward)        Ordered     heparin (porcine) injection 5,000 Units  Every 8 hours      02/13/19 1748     Place MIKEL hose  Until discontinued      02/13/19 1748     Place sequential compression device  Until discontinued      02/13/19 1748     IP VTE HIGH RISK PATIENT  Once      02/13/19 1748              Nuno Simons PA-C  Department of Hospital Medicine   [facility]

## 2019-02-19 NOTE — SUBJECTIVE & OBJECTIVE
Interval History 2/19/2019:  Patient is seen for follow-up rehab evaluation and recommendations: Influenza/BCX pending.     HPI, Past Medical, Family, and Social History remains the same as documented in the initial encounter.    Scheduled Medications:    amLODIPine  10 mg Oral QAM    atorvastatin  40 mg Oral Daily    calcium acetate  1,334 mg Oral TID WM    carvedilol  25 mg Oral BID    citalopram  20 mg Oral Nightly    cyanocobalamin  1,000 mcg Subcutaneous Once    ergocalciferol  50,000 Units Oral Q7 Days    fluticasone  2 spray Each Nare Daily    folic acid  1 mg Oral Daily    gabapentin  400 mg Oral QHS    heparin (porcine)  5,000 Units Subcutaneous Q8H    hydrALAZINE  25 mg Oral BID    insulin aspart U-100  4 Units Subcutaneous TIDWM    insulin detemir U-100  30 Units Subcutaneous BID    iron polysaccharides  150 mg Oral Daily    letrozole  2.5 mg Oral Nightly    lidocaine  1 patch Transdermal Q24H    omega-3 fatty acids-fish oil  1 capsule Oral BID    polyethylene glycol  17 g Oral Daily    senna-docusate 8.6-50 mg  1 tablet Oral BID       Diagnostic Results: Labs: Reviewed    PRN Medications: acetaminophen, acetaminophen-codeine 300-30mg, albuterol-ipratropium, bisacodyl, dextrose 50%, dextrose 50%, glucagon (human recombinant), glucose, glucose, insulin aspart U-100, ondansetron, orphenadrine, promethazine, ramelteon, sodium chloride 0.9%    Review of Systems   Constitutional: Positive for activity change, fatigue and fever.   HENT: Negative for trouble swallowing and voice change.    Respiratory: Positive for cough and shortness of breath.    Gastrointestinal: Negative for nausea and vomiting.   Musculoskeletal: Positive for gait problem. Negative for arthralgias.   Skin: Positive for color change.   Neurological: Positive for weakness. Negative for numbness.     Objective:     Vital Signs (Most Recent):  Temp: 99 °F (37.2 °C) (02/19/19 0811)  Pulse: 72 (02/19/19 0811)  Resp: 20  (02/19/19 0811)  BP: (!) 150/77 (02/19/19 0811)  SpO2: (!) 93 % (02/19/19 0811)    Vital Signs (24h Range):  Temp:  [98.5 °F (36.9 °C)-101.1 °F (38.4 °C)] 99 °F (37.2 °C)  Pulse:  [70-81] 72  Resp:  [18-20] 20  SpO2:  [92 %-97 %] 93 %  BP: (117-168)/(53-77) 150/77     Physical Exam   Constitutional: She is oriented to person, place, and time. She appears well-developed.   Obese  Appears fatigued   HENT:   Head: Normocephalic and atraumatic.   Eyes: Right eye exhibits no discharge. Left eye exhibits no discharge.   Neck: Neck supple.   Cardiovascular: Intact distal pulses.   Pulmonary/Chest:   02 via NC     Abdominal: Soft. There is no tenderness.   Musculoskeletal:        Lumbar back: She exhibits decreased range of motion and tenderness.   Neurological: She is alert and oriented to person, place, and time.   Skin: Skin is warm and dry.   RLE redness/skin changes    Psychiatric: She has a normal mood and affect. Her behavior is normal.   Vitals reviewed.    NEUROLOGICAL EXAMINATION:     MENTAL STATUS   Oriented to person, place, and time.

## 2019-02-19 NOTE — ASSESSMENT & PLAN NOTE
- Check Vitamin D, B12, Folate: Vit D low, replaced; B12 in process; Folate low, supplemented  - MRI brain reveals no central cause  - Suspect multifactorial from:              -vision issues, medications, age, weight, neuropathy, sciatica and severe DJD knees/hips  - PT and OT consults; PT rec rehab; PMR consulted; placement in process  - Avoid benzo's and other psychotropic meds  - MRI of L spine as per below

## 2019-02-19 NOTE — ASSESSMENT & PLAN NOTE
Hyperpohosphatemia  -HD orders written already  -Telemetry  - K WNL after dialysis, cont to monitor  - Calcium acetate increased on 2/17 as phos continually above goal. Phos trending down 7.4> 6.1 this AM

## 2019-02-19 NOTE — PLAN OF CARE
Pt will transfer to [facility] when medically ready.        Jigna He, MSW, [facility]  [facility]  [id number]

## 2019-02-19 NOTE — PLAN OF CARE
Problem: Physical Therapy Goal  Goal: Physical Therapy Goal  Goals to be met by: 2/24/19     Patient will increase functional independence with mobility by performing:    Updated goals 2/ 19  1. Sit to stand with supervsion with LRD.   2. Bed to chair t/f with supervision with LRD.    3 .Gait x 35 feet with CGA using RW or LRD.   4. Ascend/descend 5 stair with bilateral Handrails Maximum Assistance using Rolling Walker or LRD.   5. Stand for 5 minutes with CGA using RW for UE support.      Previously Met and revised goals  2/19:  1. Supine to sit with Contact Guard Assistance- MET  2. Sit to supine with Contact Guard Assistance- MET  3. Sit to stand transfer with Moderate Assistance- MET  4.. Bed to chair transfer with Moderate Assistance using Rolling Walker or LRD.- MET  5. Gait  x 10 feet with Maximum Assistance using Rolling Walker or LRD.- MET  6. Stand for 2 minutes with Moderate Assistance using Rolling Walker or LRD. - MET     Outcome: Ongoing (interventions implemented as appropriate)  Pt has progressed and met 6/7 goals at this time. Pt cont to progress well towards functional  ( I).  All interventions previously established remain appropriate.     Iván Petersen, PT, DPT  2/19/2019  Pager: [phone]

## 2019-02-19 NOTE — PLAN OF CARE
Problem: Occupational Therapy Goal  Goal: Occupational Therapy Goal  Goals to be met by: 2/23/19    Patient will increase functional independence with ADLs by performing:    UE Dressing with Stand-by Assistance. MET 2/19  LE Dressing with Minimal Assistance.  Grooming while standing at sink with Contact Guard Assistance. MET 2/19  Toileting from toilet with Minimal Assistance for hygiene and clothing management.   Supine to sit with Supervision.  Toilet transfer to bedside commode with Minimal Assistance.  Pt tolerate standing ~5 minutes to complete dynamic standing activity with CGA.     Outcome: Ongoing (interventions implemented as appropriate)  Goals met for UE dressing and grooming; pt progressing well toward remaining goals    Comments: Continue OT POC     Ana Delarosa, OT  2/19/2019

## 2019-02-19 NOTE — ASSESSMENT & PLAN NOTE
- BP stable, will continue to monitor  - AmLODIPine tablet 10 mg, 10 mg, Oral, QAM  - Carvedilol tablet 25 mg, 25 mg, Oral, BID  - HydrALAZINE tablet 25 mg, 25 mg, Oral, BID

## 2019-02-19 NOTE — ASSESSMENT & PLAN NOTE
-2/2 debility  -influenza and BCX pending 2/2 fever      Recommendations  -  Encourage mobility, OOB in chair, and early ambulation as appropriate  -  PT/OT evaluate and treat  -  Pain management  -  DVT prophylaxis  -  Monitor for and prevent skin breakdown and pressure ulcers  · Early mobility, repositioning/weight shifting every 20-30 minutes when sitting, turn patient every 2 hours, proper mattress/overlay and chair cushioning, pressure relief/heel protector boots

## 2019-02-19 NOTE — PLAN OF CARE
Problem: Adult Inpatient Plan of Care  Goal: Plan of Care Review  Outcome: Ongoing (interventions implemented as appropriate)  Will continue to monitor pt's safety, s/s of infection, pain level, blood glucose,

## 2019-02-20 VITALS
RESPIRATION RATE: 18 BRPM | SYSTOLIC BLOOD PRESSURE: 148 MMHG | WEIGHT: 259.69 LBS | DIASTOLIC BLOOD PRESSURE: 65 MMHG | OXYGEN SATURATION: 92 % | TEMPERATURE: 99 F | BODY MASS INDEX: 44.33 KG/M2 | HEART RATE: 68 BPM | HEIGHT: 64 IN

## 2019-02-20 LAB
ALBUMIN SERPL BCP-MCNC: 3 G/DL
ANION GAP SERPL CALC-SCNC: 15 MMOL/L
BASOPHILS # BLD AUTO: 0.01 K/UL
BASOPHILS NFR BLD: 0.1 %
BUN SERPL-MCNC: 67 MG/DL
CALCIUM SERPL-MCNC: 8.9 MG/DL
CHLORIDE SERPL-SCNC: 97 MMOL/L
CO2 SERPL-SCNC: 24 MMOL/L
CREAT SERPL-MCNC: 8.8 MG/DL
DIFFERENTIAL METHOD: ABNORMAL
ENTEROVIRUS: NOT DETECTED
EOSINOPHIL # BLD AUTO: 0.1 K/UL
EOSINOPHIL NFR BLD: 1.1 %
ERYTHROCYTE [DISTWIDTH] IN BLOOD BY AUTOMATED COUNT: 17.1 %
EST. GFR  (AFRICAN AMERICAN): 5 ML/MIN/1.73 M^2
EST. GFR  (NON AFRICAN AMERICAN): 4.3 ML/MIN/1.73 M^2
GLUCOSE SERPL-MCNC: 65 MG/DL
HCT VFR BLD AUTO: 24.5 %
HGB BLD-MCNC: 7.8 G/DL
HUMAN BOCAVIRUS: NOT DETECTED
HUMAN CORONAVIRUS, COMMON COLD VIRUS: POSITIVE
IMM GRANULOCYTES # BLD AUTO: 0.03 K/UL
IMM GRANULOCYTES NFR BLD AUTO: 0.4 %
INFLUENZA A - H1N1-09: NOT DETECTED
LYMPHOCYTES # BLD AUTO: 1.6 K/UL
LYMPHOCYTES NFR BLD: 22.5 %
MAGNESIUM SERPL-MCNC: 2 MG/DL
MCH RBC QN AUTO: 29.8 PG
MCHC RBC AUTO-ENTMCNC: 31.8 G/DL
MCV RBC AUTO: 94 FL
MONOCYTES # BLD AUTO: 0.5 K/UL
MONOCYTES NFR BLD: 7.7 %
NEUTROPHILS # BLD AUTO: 4.8 K/UL
NEUTROPHILS NFR BLD: 68.2 %
NRBC BLD-RTO: 0 /100 WBC
PARAINFLUENZA: NOT DETECTED
PHOSPHATE SERPL-MCNC: 6.7 MG/DL
PLATELET # BLD AUTO: 164 K/UL
PMV BLD AUTO: 11.3 FL
POCT GLUCOSE: 115 MG/DL (ref 70–110)
POCT GLUCOSE: 77 MG/DL (ref 70–110)
POTASSIUM SERPL-SCNC: 5.5 MMOL/L
RBC # BLD AUTO: 2.62 M/UL
RVP - ADENOVIRUS: NOT DETECTED
RVP - HUMAN METAPNEUMOVIRUS (HMPV): NOT DETECTED
RVP - INFLUENZA A: NOT DETECTED
RVP - INFLUENZA B: NOT DETECTED
RVP - RESPIRATORY SYNCTIAL VIRUS (RSV) A: NOT DETECTED
RVP - RESPIRATORY VIRAL PANEL, SOURCE: ABNORMAL
RVP - RHINOVIRUS: NOT DETECTED
SODIUM SERPL-SCNC: 136 MMOL/L
WBC # BLD AUTO: 6.98 K/UL

## 2019-02-20 PROCEDURE — 25000242 PHARM REV CODE 250 ALT 637 W/ HCPCS: Performed by: INTERNAL MEDICINE

## 2019-02-20 PROCEDURE — 94761 N-INVAS EAR/PLS OXIMETRY MLT: CPT

## 2019-02-20 PROCEDURE — 82040 ASSAY OF SERUM ALBUMIN: CPT

## 2019-02-20 PROCEDURE — 80048 BASIC METABOLIC PNL TOTAL CA: CPT

## 2019-02-20 PROCEDURE — 25000003 PHARM REV CODE 250: Performed by: INTERNAL MEDICINE

## 2019-02-20 PROCEDURE — 99217 PR OBSERVATION CARE DISCHARGE: CPT | Mod: ,,, | Performed by: PHYSICIAN ASSISTANT

## 2019-02-20 PROCEDURE — 99217 PR OBSERVATION CARE DISCHARGE: ICD-10-PCS | Mod: ,,, | Performed by: PHYSICIAN ASSISTANT

## 2019-02-20 PROCEDURE — 25000003 PHARM REV CODE 250: Performed by: PHYSICIAN ASSISTANT

## 2019-02-20 PROCEDURE — 90935 HEMODIALYSIS ONE EVALUATION: CPT | Mod: ,,, | Performed by: NURSE PRACTITIONER

## 2019-02-20 PROCEDURE — 83735 ASSAY OF MAGNESIUM: CPT

## 2019-02-20 PROCEDURE — 85025 COMPLETE CBC W/AUTO DIFF WBC: CPT

## 2019-02-20 PROCEDURE — 63600175 PHARM REV CODE 636 W HCPCS: Performed by: NURSE PRACTITIONER

## 2019-02-20 PROCEDURE — G0257 UNSCHED DIALYSIS ESRD PT HOS: HCPCS

## 2019-02-20 PROCEDURE — 94640 AIRWAY INHALATION TREATMENT: CPT

## 2019-02-20 PROCEDURE — 25000003 PHARM REV CODE 250: Performed by: NURSE PRACTITIONER

## 2019-02-20 PROCEDURE — 84100 ASSAY OF PHOSPHORUS: CPT

## 2019-02-20 PROCEDURE — 63600175 PHARM REV CODE 636 W HCPCS: Performed by: INTERNAL MEDICINE

## 2019-02-20 PROCEDURE — 36415 COLL VENOUS BLD VENIPUNCTURE: CPT

## 2019-02-20 PROCEDURE — 90935 PR HEMODIALYSIS, ONE EVALUATION: ICD-10-PCS | Mod: ,,, | Performed by: NURSE PRACTITIONER

## 2019-02-20 PROCEDURE — 27000221 HC OXYGEN, UP TO 24 HOURS

## 2019-02-20 PROCEDURE — G0378 HOSPITAL OBSERVATION PER HR: HCPCS

## 2019-02-20 RX ORDER — OSELTAMIVIR PHOSPHATE 30 MG/1
30 CAPSULE ORAL
Qty: 1 CAPSULE | Refills: 0
Start: 2019-02-21 | End: 2019-02-24

## 2019-02-20 RX ORDER — OXYCODONE HYDROCHLORIDE 5 MG/1
5 TABLET ORAL EVERY 6 HOURS PRN
Status: DISCONTINUED | OUTPATIENT
Start: 2019-02-20 | End: 2019-02-20 | Stop reason: HOSPADM

## 2019-02-20 RX ADMIN — HYDRALAZINE HYDROCHLORIDE 25 MG: 25 TABLET, FILM COATED ORAL at 12:02

## 2019-02-20 RX ADMIN — CARVEDILOL 25 MG: 25 TABLET, FILM COATED ORAL at 12:02

## 2019-02-20 RX ADMIN — ACETAMINOPHEN AND CODEINE PHOSPHATE 1 TABLET: 300; 30 TABLET ORAL at 02:02

## 2019-02-20 RX ADMIN — ATORVASTATIN CALCIUM 40 MG: 20 TABLET, FILM COATED ORAL at 12:02

## 2019-02-20 RX ADMIN — ACETAMINOPHEN AND CODEINE PHOSPHATE 1 TABLET: 300; 30 TABLET ORAL at 06:02

## 2019-02-20 RX ADMIN — STANDARDIZED SENNA CONCENTRATE AND DOCUSATE SODIUM 1 TABLET: 8.6; 5 TABLET, FILM COATED ORAL at 12:02

## 2019-02-20 RX ADMIN — CALCIUM ACETATE 1334 MG: 667 CAPSULE ORAL at 08:02

## 2019-02-20 RX ADMIN — HEPARIN SODIUM 5000 UNITS: 5000 INJECTION, SOLUTION INTRAVENOUS; SUBCUTANEOUS at 06:02

## 2019-02-20 RX ADMIN — FOLIC ACID 1 MG: 1 TABLET ORAL at 12:02

## 2019-02-20 RX ADMIN — ACETAMINOPHEN 500 MG: 500 TABLET ORAL at 11:02

## 2019-02-20 RX ADMIN — CALCIUM ACETATE 1334 MG: 667 CAPSULE ORAL at 12:02

## 2019-02-20 RX ADMIN — AMLODIPINE BESYLATE 10 MG: 10 TABLET ORAL at 06:02

## 2019-02-20 RX ADMIN — IPRATROPIUM BROMIDE AND ALBUTEROL SULFATE 3 ML: .5; 3 SOLUTION RESPIRATORY (INHALATION) at 01:02

## 2019-02-20 RX ADMIN — INSULIN DETEMIR 30 UNITS: 100 INJECTION, SOLUTION SUBCUTANEOUS at 08:02

## 2019-02-20 RX ADMIN — LIDOCAINE 1 PATCH: 50 PATCH TOPICAL at 02:02

## 2019-02-20 RX ADMIN — POLYETHYLENE GLYCOL 3350 17 G: 17 POWDER, FOR SOLUTION ORAL at 12:02

## 2019-02-20 RX ADMIN — ERYTHROPOIETIN 10000 UNITS: 10000 INJECTION, SOLUTION INTRAVENOUS; SUBCUTANEOUS at 11:02

## 2019-02-20 RX ADMIN — SODIUM CHLORIDE 300 ML: 0.9 INJECTION, SOLUTION INTRAVENOUS at 07:02

## 2019-02-20 RX ADMIN — OSELTAMIVIR PHOSPHATE 30 MG: 30 CAPSULE ORAL at 12:02

## 2019-02-20 RX ADMIN — Medication 1 CAPSULE: at 12:02

## 2019-02-20 RX ADMIN — HEPARIN SODIUM 5000 UNITS: 5000 INJECTION, SOLUTION INTRAVENOUS; SUBCUTANEOUS at 02:02

## 2019-02-20 RX ADMIN — Medication 150 MG: at 12:02

## 2019-02-20 NOTE — PROGRESS NOTES
[facility] H[facility] NOTE     Patient currently on hemodialysis for removal of uremic toxins and volume.     Patient seen and evaluated on hemodialysis, tolerating treatment, see HD flowsheet for vitals and assessments.    No Hypotension, chest pain, shortness of breath, cramping, nausea or vomiting.       Ultrafiltration goal is 2L as tolerated, keep MAP >65     Labs have been reviewed and the dialysate bath has been adjusted.     Assessment/Plan:  Seen on dialysis this morning, tolerating session with current UFR, no complications.    Patient remains hyperkalemic this morning, bath adjusted to 2 K  Will continue MWF dialysis treatments while in-patient, pt pending discharge to SNF    Anemia of ESRD  Hgb 7.4  Will start Epo today   Recommend starting patient CATY outpatient if discharge to SNF today     BMM  Renal diet  Novasource with meals  Phos 6.7, continue binders  Daily renal function panel       Aleja Rowley DNP, APRN, FNP-C  Nephrology Department  Pager:  [phone]

## 2019-02-20 NOTE — NURSING
Received pt back from dialysis. O2 sat was 86% on 2 L NC; have increased to 4 L NC and will reassess. Crackles heard to bases posteriorly.

## 2019-02-20 NOTE — PLAN OF CARE
[facility]    FACILITY TRANSFER ORDERS      Patient Name: Kylie King  YOB: 1955    PCP: Virginia Foote MD   PCP Address: [address] [facility] [address] / [place] [address]  PCP Phone Number: [phone]  PCP Fax: [phone]    Encounter Date: 02/20/2019    Admit to: Rehab    Vital Signs:  Routine    Diagnoses:   Active Hospital Problems    Diagnosis  POA    *Frequent falls [R29.6]  Not Applicable     Priority: 1 - High    Lumbar stenosis [M48.061]  Yes     Priority: 2     Upper respiratory infection, acute [J06.9]  Unknown     Priority: 3     Acute pain of both knees [M25.561, M25.562]  Yes     Priority: 4     ESRD (end stage renal disease) on dialysis [N18.6, Z99.2]  Not Applicable     Priority: 5     Hyperkalemia [E87.5]  Yes     Priority: 6     DM type 2, uncontrolled, with neuropathy [E11.40, E11.65]  Yes     Priority: 7     Secondary renovascular hypertension, malignant [I15.0]  Yes     Priority: 8     Hypercholesterolemia [E78.00]  Yes     Priority: 9      2016: Began statin.      Depression [F32.9]  Yes     Priority: 10     History of breast cancer [Z85.3]  Not Applicable     Priority: 11      2013: Left mastectomy.      Impaired functional mobility and endurance [Z74.09]  Unknown    Muscular imbalance [M62.9]  Yes    Fall (on) (from) other stairs and steps, initial encounter [W10.8XXA]  Yes    ESRD on hemodialysis [N18.6, Z99.2]  Not Applicable    Anemia in ESRD (end-stage renal disease) [N18.6, D63.1]  Yes    Occasional tremors [R25.1]  Yes    Primary osteoarthritis involving multiple joints [M15.0]  Yes      Resolved Hospital Problems   No resolved problems to display.       Allergies:  Review of patient's allergies indicates:   Allergen Reactions    Cyclobenzaprine Hallucinations    Erythromycin      Stomach upset    Keflex [cephalexin]      Yeast infection       Diet: cardiac diet and renal diet    Activities: Activity as tolerated    Nursing:       Labs: BMP Daily for 7 days     CONSULTS:    Physical Therapy to evaluate and treat. , Occupational Therapy to evaluate and treat. and  to evaluate for community resources/long-range planning.    MISCELLANEOUS CARE:      WOUND CARE ORDERS  None    Medications: Review discharge medications with patient and family and provide education.      Current Discharge Medication List      START taking these medications    Details   oseltamivir (TAMIFLU) 30 MG capsule Take 1 capsule (30 mg total) by mouth every Mon, Wed, Fri. for 3 days  Qty: 1 capsule, Refills: 0         CONTINUE these medications which have NOT CHANGED    Details   acetaminophen-codeine 300-30mg (TYLENOL #3) 300-30 mg Tab Take 1 tablet by mouth every 6 (six) hours as needed.  Qty: 90 tablet, Refills: 2      amLODIPine (NORVASC) 10 MG tablet Take 1 tablet (10 mg total) by mouth every morning.  Qty: 90 tablet, Refills: 3      carvedilol (COREG) 25 MG tablet Take 1 tablet (25 mg total) by mouth 2 (two) times daily.  Qty: 180 tablet, Refills: 3    Associated Diagnoses: Essential hypertension      citalopram (CELEXA) 20 MG tablet Take 1 tablet (20 mg total) by mouth nightly.  Qty: 90 tablet, Refills: 3    Associated Diagnoses: Depression, unspecified depression type      gabapentin (NEURONTIN) 400 MG capsule Take 1 capsule (400 mg total) by mouth every evening.  Qty: 90 capsule, Refills: 3    Associated Diagnoses: Tethered cord; Spondylosis without myelopathy; DDD (degenerative disc disease), lumbosacral; Spinal stenosis of lumbar region with neurogenic claudication; Thoracic and lumbosacral neuritis; Acquired spondylolisthesis; Bilateral low back pain without sciatica, unspecified chronicity      hydrALAZINE (APRESOLINE) 25 MG tablet Take 1 tablet (25 mg total) by mouth 2 (two) times daily.  Qty: 180 tablet, Refills: 3      insulin detemir U-100 (LEVEMIR FLEXTOUCH U-100 INSULN) 100 unit/mL (3 mL) SubQ InPn pen Inject 45 Units into the skin 2 (two)  "times daily.  Qty: 30 mL, Refills: 3      orphenadrine (NORFLEX) 100 mg tablet Take 1 tablet (100 mg total) by mouth 2 (two) times daily as needed for Muscle spasms.  Qty: 30 tablet, Refills: 1      ranitidine (ZANTAC) 150 MG capsule Take 1 capsule (150 mg total) by mouth nightly.  Qty: 90 capsule, Refills: 3      albuterol 90 mcg/actuation inhaler Inhale 2 puffs into the lungs every 6 (six) hours as needed for Wheezing. Rescue  Qty: 18 g, Refills: 0      ammonium lactate 12 % Crea Apply small amount to feet except for in between toes twice a day  Qty: 1 Bottle, Refills: 3      atorvastatin (LIPITOR) 40 MG tablet Take 1 tablet (40 mg total) by mouth once daily.  Qty: 90 tablet, Refills: 3      BD INSULIN PEN NEEDLE UF MINI 31 gauge x 3/16" Ndle USE 5 NEEDLES PER DAY  Qty: 450 each, Refills: 2      calcium acetate (PHOSLO) 667 mg tablet Take 2 tablets by mouth 3 (three) times daily with meals.  Qty: 540 tablet, Refills: 3      ergocalciferol (ERGOCALCIFEROL) 50,000 unit Cap Take 50,000 Units by mouth every 7 days. Takes on Wednesdays      fish oil-omega-3 fatty acids 300-1,000 mg capsule Take 1 capsule by mouth every morning.      fluticasone (FLONASE) 50 mcg/actuation nasal spray 2 sprays (100 mcg total) by Each Nare route once daily.  Qty: 1 Bottle, Refills: 2      insulin lispro (HUMALOG KWIKPEN INSULIN) 100 unit/mL InPn pen BLOOD GLUCOSE 150-200, INJECT 1 UNITS UNDER THE SKIN, 201-250, 2 UNITS, 251-300, 3 UNITS THREE TIMES A DAY AS DIRECTED  Qty: 1 Box, Refills: 3      insulin needles, disposable, (PEN NEEDLE, DIABETIC) 31 Ndle Patient needs 5 needles a day  Qty: 500 each, Refills: 3      lancets (ONETOUCH DELICA LANCETS) 33 gauge Misc 1 lancet by Misc.(Non-Drug; Combo Route) route 4 (four) times daily before meals and nightly.  Qty: 400 each, Refills: 4      letrozole (FEMARA) 2.5 mg Tab Take 1 tablet (2.5 mg total) by mouth nightly.  Qty: 90 tablet, Refills: 3      lidocaine (LIDODERM) 5 % Place 1 patch onto " "the skin once daily. Apply patch to affected area for 12 hours, then remove for 12 hours.  Qty: 30 patch, Refills: 0      loratadine (CLARITIN) 10 mg tablet Take 1 tablet (10 mg total) by mouth once daily.  Qty: 90 tablet, Refills: 0      pen needle, diabetic 32 gauge x 5/32" Ndle 1 Device by Misc.(Non-Drug; Combo Route) route 5 (five) times daily.  Qty: 450 each, Refills: 6      prednisolon/gatiflox/bromfenac (PREDNISOL ACE-GATIFLOX-BROMFEN) 1-0.5-0.075 % DrpS Apply 1 drop to eye 3 (three) times daily.  Qty: 7 mL, Refills: 3    Comments: ALLERGIC TO ERYTHROMYCIN, cyclobenzaprine, keflex. Surgery date of 01/24/2019                  _________________________________  Nuno Simons PA-C  02/20/2019        "

## 2019-02-20 NOTE — PROGRESS NOTES
Pt arrived to unit via bed. Pt alert & stable. Maintenance pt. Thrill/bruit noted, accessed RT AVF X2 15g needles without difficulty. Duration 3.0hrs, Qb 400ml/min, Qd 800ml/min, planned UFG 2.0L, orders reviewed.

## 2019-02-20 NOTE — PLAN OF CARE
Previously scheduled appointment with Emelyn Velásquez on 2/22/19 at 1300 rescheduled for 3/1/19 at 1300. Plan to discharge patient to [facility] today. Will continue to follow.

## 2019-02-20 NOTE — PLAN OF CARE
Pt to transfer to [facility].  SW scheduled transport via [facility] for 4pm.  SN can call report to [phone].          Jigna He, MSW, [facility]  [facility]  [id number]

## 2019-02-20 NOTE — PT/OT/SLP PROGRESS
Physical Therapy      Patient Name:  Kylie King   MRN:  [id number]    Patient not seen today secondary to (pt planning to d/c to rehab today. ). Will follow-up if pt remains in acute hospital stay.    Iván Petersen, PT

## 2019-02-20 NOTE — NURSING
Released to transport service to transfer to rehab; rm [phone]; all belongings with pt and her friend. No new problems at time of transfer.

## 2019-02-20 NOTE — PROGRESS NOTES
IHD completed, blood returned. Pt alert & stable. Thrill/bruit noted,hemostasis 10 minutes, dry occlusive dressing applied to sites. Duration 3.0hrs, Qb 400ml/min, Qd 800ml/min, UFG 2.0L. Report called. Pt transported to [facility] via bed.

## 2019-02-21 NOTE — PLAN OF CARE
02/21/19 0709   Final Note   Assessment Type Final Discharge Note     Patient discharged to [facility] on 2/20/19.

## 2019-02-21 NOTE — ASSESSMENT & PLAN NOTE
- Check Vitamin D, B12, Folate: Vit D low, replaced; B12 in process; Folate low, supplemented  - MRI brain reveals no central cause  - Suspect multifactorial from:              -vision issues, medications, age, weight, neuropathy, sciatica and severe DJD knees/hips  - PT and OT consults; PT rec rehab; PMR consulted; accepted to rehab  - Avoid benzo's and other psychotropic meds  - MRI of L spine as per below

## 2019-02-21 NOTE — PT/OT/SLP DISCHARGE
Occupational Therapy Discharge Summary    Kylie King  MRN: [id number]   Principal Problem: Frequent falls      Patient Discharged from acute Occupational Therapy on 2/20/19.  Please refer to prior OT note dated 2/19/19 for functional status.    Assessment:      Goals partially met.    Objective:     GOALS:   Multidisciplinary Problems     Occupational Therapy Goals     Not on file          Multidisciplinary Problems (Resolved)        Problem: Occupational Therapy Goal    Goal Priority Disciplines Outcome Interventions   Occupational Therapy Goal   (Resolved)     OT, PT/OT Outcome(s) achieved    Description:  Goals to be met by: 2/23/19    Patient will increase functional independence with ADLs by performing:    UE Dressing with Stand-by Assistance. MET 2/19  LE Dressing with Minimal Assistance.  Grooming while standing at sink with Contact Guard Assistance. MET 2/19  Toileting from toilet with Minimal Assistance for hygiene and clothing management.   Supine to sit with Supervision.  Toilet transfer to bedside commode with Minimal Assistance.  Pt tolerate standing ~5 minutes to complete dynamic standing activity with CGA.                       Reasons for Discontinuation of Therapy Services  Transfer to alternate level of care.      Plan:     Patient Discharged to: Inpatient Rehab    Ana Delarosa OT  2/21/2019

## 2019-02-21 NOTE — PLAN OF CARE
Problem: Fluid Volume Excess (Chronic Kidney Disease)  Goal: Fluid Balance  Outcome: Outcome(s) achieved Date Met: 02/20/19  UFG 1.0L

## 2019-02-21 NOTE — DISCHARGE SUMMARY
"[facility] Hospital Medicine  Discharge Summary      Patient Name: Kylie King  MRN: [id number]  Admission Date: 2/13/2019  Hospital Length of Stay: 0 days  Discharge Date and Time: 2/20/2019  5:00 PM  Attending Physician: Dora att. providers found   Discharging Provider: Nuno Simons PA-C  Primary Care Provider: Virginia Foote MD  [facility] Nuno Simons PA-C    HPI:   Ms. King is a 62yo lady with multiple past medical problems (see below), who has ESRD on HD M/W/F due to longstanding uncontrolled DM2 and renovascular HTN.  She has always had "bad balance" due to her obesity, age and neuropathy, but over the past year she has noticed an up-tick in her falls.  Over the past 4 months this has almost doubled, and now over the past few weeks she has fallen 8 times.  Her problems first started with symptoms of right sided sciatica pain (sharp pain radiating down the back of her leg) with weakness.  This led to her guarding the right side with overuse of her left leg.  After a week or so of this and still falling, she suddenly noticed that her left leg would, "just suddenly give out" on her.  She would go to make a step and on her left foot touching, it was, "like the leg just didn't have any strength and down I'd go"  Her right knee is extremely painful to any pressure or movement and still with some sciatica symptoms.  She is now having difficulty ambulating.  She has some right buttock pain as well, but no harry lumbar back pain.  She did see her PCP and an Orthopedist for these symptoms.  She was given a Rx for Norflex, which did seem to assist some with the pain, but actually made her balance significantly worse, so she stopped it after 3 days.     Due to the poor balance and fear of using the steps, she missed her HD session on Monday.  She was planning to go today, but again fell and struck her knees and her right lower ribcage.  She feels, "a " "little knot there now."  To complicate things, she also recently had cataract surgery (1/30/19), so this is impeding her vision to some degree (along with the dark sunglasses).    * No surgery found *      Hospital Course:   Pt admitted for L sided leg pain that has been so severe she has had multiple falls and missed her HD appointments. MRI brain shows no acute process to attribute frequent falls. Knee and hip xray show no acute fracture or dislocation. MRI lumbar shows mod/severe spinal canal stenosis. Pt not able to complete MRI pelvis secondary to pain/anxiety. Ortho consulted, no acute surgical intervention necessary per ortho/orthospine. PT evaluated pt, recommending rehab. Pt approved but awaiting insurance authorization (incorrect insurance on file, resubmitted with correct insurance 2/18). In the interim, pt developed new onset fever (101.1) with persistent cough since admission. CXR with no acute pulmonary processes. Procal mildly elevated (0.33). Rapid flu negative. Resp viral panel showed coronavirus, blood cultures no growth. Tamiflu initiated given symptoms, responded well. Patient afebrile and stable for discharge to rehab.     Consults:   Consults (From admission, onward)        Status Ordering Provider     Case Management/  Once     Provider:  (Not yet assigned)    Completed JIM VALDERRAMA     Inpatient consult to Nephrology  Once     Provider:  (Not yet assigned)    Completed PARVIN MARINO     Inpatient consult to Orthopedic Surgery  Once     Provider:  (Not yet assigned)    Completed JIM VALDERRAMA     Inpatient consult to Orthopedics  Once     Provider:  (Not yet assigned)    Completed JIM VALDERRAMA     Inpatient consult to Physical Medicine Rehab  Once     Provider:  (Not yet assigned)    Completed PATRICIO RENE          * Frequent falls    - Check Vitamin D, B12, Folate: Vit D low, replaced; B12 in process; Folate low, supplemented  - MRI brain reveals no central " cause  - Suspect multifactorial from:              -vision issues, medications, age, weight, neuropathy, sciatica and severe DJD knees/hips  - PT and OT consults; PT rec rehab; PMR consulted; accepted to rehab  - Avoid benzo's and other psychotropic meds  - MRI of L spine as per below     Lumbar stenosis    R sided sciatica  - Lumbar MRI shows mod-sev spinal canal stenosis without evidence of fx/dislocations  - Pt unable to complete pelvic MRI 2/2 anxiety/ pain  - Oorphenadrine 12 hr tablet 100 mg, 100 mg, Oral, BID PRN spasm of muscles  - No surgical intervention warranted at this time per ortho/ortho spine Dr. Olivia, appreciate assistance     Upper respiratory infection, acute    - Pt with a fever (101.1) overnight. Currently afebrile.  - CXR with no acute processes.  - Procal mildly elevated (0.33).  - Rapid flu negative. Pt vaccinated but given constitutional symptoms (fever, cough, fatigue) will treat with Tamiflu for 5 days.  - Resp viral panel + for coronavirus     Acute pain of both knees    -Ortho consult, no acute surgical intervention necessary  -Local cooling pack  -Brace as per PT or ortho     ESRD (end stage renal disease) on dialysis    Anemia in ESRD    - Nephrology consult  - Pt dialyzed 2/18 with improvement in labs. Will receive HD tomorrow.  - Calcium acetate capsule 667 mg, 667 mg, Oral, TID WM  - Ergocalciferol capsule 50,000 Units, 50,000 Units, Oral, Q7 Days  - Defer for Procrit to Nephro  - B12, Fe studies, folate ordered, see above  - Hgb 7.7. Folate and niferix started 2/18 given Hgb 8.5>8>7.8     Hyperkalemia    Hyperpohosphatemia  -HD orders written already  -Telemetry  - K WNL after dialysis, cont to monitor  - Calcium acetate increased on 2/17 as phos continually above goal. Phos trending down 7.4> 6.1 this AM     DM type 2, uncontrolled, with neuropathy    - Gabapentin capsule 400 mg, 400 mg, Oral, QHS  - Insulin detemir U-100 pen 45 Units, 45 Units, Subcutaneous, BID  - Asp 9  units SQ AC with meals TID + SSI protocol  - BG stable, cont to monitor  2/15:hypoglycemic low 60s; decreased detemir to 30U and aspart to 4U  2/16-19: BG at goal throughout the day     Secondary renovascular hypertension, malignant    - BP stable, will continue to monitor  - AmLODIPine tablet 10 mg, 10 mg, Oral, QAM  - Carvedilol tablet 25 mg, 25 mg, Oral, BID  - HydrALAZINE tablet 25 mg, 25 mg, Oral, BID     Hypercholesterolemia    - Atorvastatin tablet 40 mg, 40 mg, Oral, Daily  - Omega-3 fatty acids-fish oil 340-1,000 mg capsule 1 capsule, 1 capsule, Oral, BID     Depression    - Citalopram tablet 20 mg, 20 mg, Oral, Nightly     History of breast cancer    - Letrozole tablet 2.5 mg, 2.5 mg, Oral, Nightly       Final Active Diagnoses:    Diagnosis Date Noted POA    PRINCIPAL PROBLEM:  Frequent falls [R29.6] 02/14/2019 Not Applicable    Lumbar stenosis [M48.061] 11/23/2015 Yes    Upper respiratory infection, acute [J06.9] 02/19/2019 Yes    Acute pain of both knees [M25.561, M25.562] 02/13/2019 Yes    ESRD (end stage renal disease) on dialysis [N18.6, Z99.2] 05/28/2018 Not Applicable    Hyperkalemia [E87.5] 02/14/2019 Yes    DM type 2, uncontrolled, with neuropathy [E11.40, E11.65] 12/21/2012 Yes    Secondary renovascular hypertension, malignant [I15.0] 02/14/2019 Yes    Hypercholesterolemia [E78.00] 12/21/2012 Yes    Depression [F32.9] 02/14/2019 Yes    History of breast cancer [Z85.3] 03/21/2013 Not Applicable    Impaired functional mobility and endurance [Z74.09] 02/18/2019 Yes    Muscular imbalance [M62.9] 02/14/2019 Yes    Fall (on) (from) other stairs and steps, initial encounter [W10.8XXA] 02/14/2019 Yes    ESRD on hemodialysis [N18.6, Z99.2] 02/14/2019 Not Applicable    Anemia in ESRD (end-stage renal disease) [N18.6, D63.1] 02/14/2019 Yes    Occasional tremors [R25.1] 02/14/2019 Yes    Primary osteoarthritis involving multiple joints [M15.0] 02/13/2019 Yes      Problems Resolved During  "this Admission:       Discharged Condition: good    Disposition: Rehab Facility    Follow Up:  Follow-up Information     Emelyn Velásquez MD On 3/1/2019.    Specialty:  Internal Medicine  Why:  at 1:00pm; Dr. Foote not available  Contact information:  [address]  [facility] [address]  [phone]                 Patient Instructions:      Diet renal     Diet Cardiac       Significant Diagnostic Studies: Labs:   BMP:   Recent Labs   Lab 02/20/19  0357   GLU 65*      K 5.5*   CL 97   CO2 24   BUN 67*   CREATININE 8.8*   CALCIUM 8.9   MG 2.0    and CBC   Recent Labs   Lab 02/20/19  0358   WBC 6.98   HGB 7.8*   HCT 24.5*          Pending Diagnostic Studies:     None         Medications:  Reconciled Home Medications:      Medication List      START taking these medications    oseltamivir 30 MG capsule  Commonly known as:  TAMIFLU  Take 1 capsule (30 mg total) by mouth every Mon, Wed, Fri. for 3 days        CHANGE how you take these medications    ranitidine 150 MG capsule  Commonly known as:  ZANTAC  Take 1 capsule (150 mg total) by mouth nightly.  What changed:    · when to take this  · reasons to take this        CONTINUE taking these medications    acetaminophen-codeine 300-30mg 300-30 mg Tab  Commonly known as:  TYLENOL #3  Take 1 tablet by mouth every 6 (six) hours as needed.     albuterol 90 mcg/actuation inhaler  Commonly known as:  PROVENTIL/VENTOLIN HFA  Inhale 2 puffs into the lungs every 6 (six) hours as needed for Wheezing. Rescue     amLODIPine 10 MG tablet  Commonly known as:  NORVASC  Take 1 tablet (10 mg total) by mouth every morning.     ammonium lactate 12 % Crea  Apply small amount to feet except for in between toes twice a day     atorvastatin 40 MG tablet  Commonly known as:  LIPITOR  Take 1 tablet (40 mg total) by mouth once daily.     * BD ULTRA-FINE MINI PEN NEEDLE 31 gauge x 3/16" Ndle  Generic drug:  pen needle, diabetic  USE 5 NEEDLES PER DAY     * pen needle, diabetic " "32 gauge x 5/32" Ndle  1 Device by Misc.(Non-Drug; Combo Route) route 5 (five) times daily.     calcium acetate 667 mg tablet  Commonly known as:  PHOSLO  Take 2 tablets by mouth 3 (three) times daily with meals.     carvedilol 25 MG tablet  Commonly known as:  COREG  Take 1 tablet (25 mg total) by mouth 2 (two) times daily.     citalopram 20 MG tablet  Commonly known as:  CELEXA  Take 1 tablet (20 mg total) by mouth nightly.     ergocalciferol 50,000 unit Cap  Commonly known as:  ERGOCALCIFEROL  Take 50,000 Units by mouth every 7 days. Takes on Wednesdays     fish oil-omega-3 fatty acids 300-1,000 mg capsule  Take 1 capsule by mouth every morning.     fluticasone 50 mcg/actuation nasal spray  Commonly known as:  FLONASE  2 sprays (100 mcg total) by Each Nare route once daily.     gabapentin 400 MG capsule  Commonly known as:  NEURONTIN  Take 1 capsule (400 mg total) by mouth every evening.     hydrALAZINE 25 MG tablet  Commonly known as:  APRESOLINE  Take 1 tablet (25 mg total) by mouth 2 (two) times daily.     insulin detemir U-100 100 unit/mL (3 mL) Inpn pen  Commonly known as:  LEVEMIR FLEXTOUCH U-100 INSULN  Inject 45 Units into the skin 2 (two) times daily.     insulin lispro 100 unit/mL pen  Commonly known as:  HumaLOG KwikPen Insulin  BLOOD GLUCOSE 150-200, INJECT 1 UNITS UNDER THE SKIN, 201-250, 2 UNITS, 251-300, 3 UNITS THREE TIMES A DAY AS DIRECTED     insulin needles (disposable) 31 Ndle  Commonly known as:  PEN NEEDLE, DIABETIC  Patient needs 5 needles a day     lancets 33 gauge Misc  Commonly known as:  ONETOUCH DELICA LANCETS  1 lancet by Misc.(Non-Drug; Combo Route) route 4 (four) times daily before meals and nightly.     letrozole 2.5 mg Tab  Commonly known as:  FEMARA  Take 1 tablet (2.5 mg total) by mouth nightly.     lidocaine 5 %  Commonly known as:  LIDODERM  Place 1 patch onto the skin once daily. Apply patch to affected area for 12 hours, then remove for 12 hours.     loratadine 10 mg " tablet  Commonly known as:  CLARITIN  Take 1 tablet (10 mg total) by mouth once daily.     orphenadrine 100 mg tablet  Commonly known as:  NORFLEX  Take 1 tablet (100 mg total) by mouth 2 (two) times daily as needed for Muscle spasms.     prednisol ace-gatiflox-bromfen 1-0.5-0.075 % Drps  Apply 1 drop to eye 3 (three) times daily.         * This list has 2 medication(s) that are the same as other medications prescribed for you. Read the directions carefully, and ask your doctor or other care provider to review them with you.                Indwelling Lines/Drains at time of discharge:   Lines/Drains/Airways     Drain                 Hemodialysis AV Fistula Right forearm -- days         Hemodialysis AV Fistula 05/28/18 1436  269 days                Time spent on the discharge of patient: 36 minutes  Patient was seen and examined on the date of discharge and determined to be suitable for discharge.         Nuno Simons PA-C  Department of Hospital Medicine  [facility]

## 2019-02-21 NOTE — ASSESSMENT & PLAN NOTE
- Pt with a fever (101.1) overnight. Currently afebrile.  - CXR with no acute processes.  - Procal mildly elevated (0.33).  - Rapid flu negative. Pt vaccinated but given constitutional symptoms (fever, cough, fatigue) will treat with Tamiflu for 5 days.  - Resp viral panel + for coronavirus

## 2019-02-21 NOTE — PLAN OF CARE
Problem: Occupational Therapy Goal  Goal: Occupational Therapy Goal  Goals to be met by: 2/23/19    Patient will increase functional independence with ADLs by performing:    UE Dressing with Stand-by Assistance. MET 2/19  LE Dressing with Minimal Assistance.  Grooming while standing at sink with Contact Guard Assistance. MET 2/19  Toileting from toilet with Minimal Assistance for hygiene and clothing management.   Supine to sit with Supervision.  Toilet transfer to bedside commode with Minimal Assistance.  Pt tolerate standing ~5 minutes to complete dynamic standing activity with CGA.      Outcome: Outcome(s) achieved Date Met: 02/21/19  Goals partially met; pt d/c to rehab

## 2019-02-24 LAB
BACTERIA BLD CULT: NORMAL
BACTERIA BLD CULT: NORMAL

## 2019-02-26 ENCOUNTER — HOSPITAL ENCOUNTER (OUTPATIENT)
Dept: RADIOLOGY | Facility: HOSPITAL | Age: 64
Discharge: HOME | End: 2019-02-26
Attending: PHYSICAL MEDICINE & REHABILITATION
Payer: MEDICARE

## 2019-02-27 ENCOUNTER — HOSPITAL ENCOUNTER (OUTPATIENT)
Dept: RADIOLOGY | Facility: HOSPITAL | Age: 64
Discharge: HOME | End: 2019-02-27
Attending: PHYSICAL MEDICINE & REHABILITATION
Payer: COMMERCIAL

## 2019-02-27 PROCEDURE — 93971 US LOWER EXTREMITY VEINS RIGHT: ICD-10-PCS | Mod: 26,RT,, | Performed by: RADIOLOGY

## 2019-02-27 PROCEDURE — 93971 EXTREMITY STUDY: CPT | Mod: 26,RT,, | Performed by: RADIOLOGY

## 2019-03-07 ENCOUNTER — OFFICE VISIT (OUTPATIENT)
Dept: OPTOMETRY | Facility: CLINIC | Age: 64
End: 2019-03-07
Payer: COMMERCIAL

## 2019-03-07 DIAGNOSIS — Z98.41 S/P BILATERAL CATARACT EXTRACTION: Primary | ICD-10-CM

## 2019-03-07 DIAGNOSIS — Z98.42 S/P BILATERAL CATARACT EXTRACTION: Primary | ICD-10-CM

## 2019-03-07 PROCEDURE — 99999 PR PBB SHADOW E&M-EST. PATIENT-LVL II: CPT | Mod: PBBFAC,,, | Performed by: OPTOMETRIST

## 2019-03-07 PROCEDURE — 99999 PR PBB SHADOW E&M-EST. PATIENT-LVL II: ICD-10-PCS | Mod: PBBFAC,,, | Performed by: OPTOMETRIST

## 2019-03-07 PROCEDURE — 99024 PR POST-OP FOLLOW-UP VISIT: ICD-10-PCS | Mod: S$GLB,,, | Performed by: OPTOMETRIST

## 2019-03-07 PROCEDURE — 99024 POSTOP FOLLOW-UP VISIT: CPT | Mod: S$GLB,,, | Performed by: OPTOMETRIST

## 2019-03-07 NOTE — PROGRESS NOTES
HPI     Post-op Evaluation      Additional comments: 5 wk phaco OS              Comments     Last eye exam was 2/1/19 with Dr. Moncada.  Patient states hasn't really had a chance to assess distance vision since   cataract sx's. Been using sister's readers but they could be stronger.    01/24/2019 IMPLANT: WF 24.0 OD  01/31/2019 IMPLANT: WF 24.5 OS          Last edited by Padmaja Ferrell on 3/7/2019 10:27 AM. (History)            Assessment /Plan     For exam results, see Encounter Report.    S/P bilateral cataract extraction  -     OCT- Retina          1.  Pt doing well.  Bifocal rx given.  No CME OU.   Retina flat and intact OU--no holes, tears, breaks, or RDs.  Return care to Dr. Hurst.

## 2019-03-19 PROBLEM — M25.561 ACUTE PAIN OF BOTH KNEES: Status: RESOLVED | Noted: 2019-02-13 | Resolved: 2019-03-19

## 2019-03-19 PROBLEM — R79.89 ELEVATED TROPONIN: Status: RESOLVED | Noted: 2018-04-19 | Resolved: 2019-03-19

## 2019-03-19 PROBLEM — M25.562 ACUTE PAIN OF BOTH KNEES: Status: RESOLVED | Noted: 2019-02-13 | Resolved: 2019-03-19

## 2019-03-19 PROBLEM — Z72.0 TOBACCO USE: Status: ACTIVE | Noted: 2019-03-06

## 2019-03-19 PROBLEM — R60.1 ANASARCA: Status: RESOLVED | Noted: 2018-04-19 | Resolved: 2019-03-19

## 2019-03-19 RX ORDER — ACETAMINOPHEN 325 MG/1
650 TABLET ORAL EVERY 6 HOURS PRN
COMMUNITY
Start: 2019-03-16 | End: 2020-12-07 | Stop reason: SDUPTHER

## 2019-03-19 RX ORDER — CALCIUM ACETATE 667 MG/1
2001 CAPSULE ORAL
COMMUNITY
Start: 2019-03-16 | End: 2019-04-27 | Stop reason: SDUPTHER

## 2019-03-19 RX ORDER — SENNOSIDES 8.6 MG/1
2 TABLET ORAL NIGHTLY PRN
COMMUNITY
Start: 2019-03-16 | End: 2019-05-07

## 2019-03-21 ENCOUNTER — TELEPHONE (OUTPATIENT)
Dept: PHYSICAL MEDICINE AND REHAB | Facility: CLINIC | Age: 64
End: 2019-03-21

## 2019-03-21 NOTE — TELEPHONE ENCOUNTER
----- Message from Luis Guerrero sent at 3/21/2019  4:29 PM CDT -----  Needs Advice    Reason for call: Pt is asking to speak w/ the doctor to discuss a medication that she was placed on while in rehab        Communication Preference: [phone]    Additional Information:

## 2019-03-22 ENCOUNTER — TELEPHONE (OUTPATIENT)
Dept: PHYSICAL MEDICINE AND REHAB | Facility: CLINIC | Age: 64
End: 2019-03-22

## 2019-03-22 NOTE — TELEPHONE ENCOUNTER
Spoke to pt, she says she already has the tylenol codeine and is calling about a non opioid she received in the hospital that she was supposed to be prescribed when she left.  She did not know the name, and did not recognize the names of any meds I listed.  She says that the  RN did not have any medication, and that you were supposed to have ordered these.  She accepted the iron information.

## 2019-04-10 ENCOUNTER — TELEPHONE (OUTPATIENT)
Dept: INTERNAL MEDICINE | Facility: CLINIC | Age: 64
End: 2019-04-10

## 2019-04-10 DIAGNOSIS — L98.499 ULCER OF SKIN OF BREAST: Primary | ICD-10-CM

## 2019-04-10 NOTE — TELEPHONE ENCOUNTER
----- Message from Jennie Navarro sent at 4/10/2019  1:34 PM CDT -----  Contact: Dionne @ [facility] [phone]  Dionne is calling in regards to the patient having an ulcer underneath her right breast and its draining and infected, she would like to put in an order for home health nurse services for the patient please.

## 2019-04-10 NOTE — TELEPHONE ENCOUNTER
Called to speak with the patient about the wound concern and breast drainage there was no answer lmom for a rtc

## 2019-04-11 ENCOUNTER — TELEPHONE (OUTPATIENT)
Dept: INTERNAL MEDICINE | Facility: CLINIC | Age: 64
End: 2019-04-11

## 2019-04-12 ENCOUNTER — TELEPHONE (OUTPATIENT)
Dept: INTERNAL MEDICINE | Facility: CLINIC | Age: 64
End: 2019-04-12

## 2019-04-12 NOTE — TELEPHONE ENCOUNTER
Spoke with the patient and she advised she wanted an appointment as she has been falling a lot and she was scheduled in May on a date and time that she couldn't make it because she has dialysis on that date and time. She was rescheduled from the other appointment.

## 2019-04-12 NOTE — TELEPHONE ENCOUNTER
----- Message from Jennie Navarro sent at 4/12/2019  2:27 PM CDT -----  Contact: Pt self Mobile/Home [phone]    Patient is returning a phone call.  Who left a message for the patient:   Does patient know what this is regarding:    Comments: Patient said she received a call the other day and she would like a call back please.

## 2019-05-03 ENCOUNTER — TELEPHONE (OUTPATIENT)
Dept: INTERNAL MEDICINE | Facility: CLINIC | Age: 64
End: 2019-05-03

## 2019-05-03 NOTE — TELEPHONE ENCOUNTER
----- Message from Shirin Garcia sent at 5/3/2019  2:13 PM CDT -----  Contact: Dionne  with [facility]  [phone]  PT Dionne with [facility] is requesting 2 more weeks of PT. Please advise.

## 2019-05-07 ENCOUNTER — OFFICE VISIT (OUTPATIENT)
Dept: INTERNAL MEDICINE | Facility: CLINIC | Age: 64
End: 2019-05-07
Payer: COMMERCIAL

## 2019-05-07 VITALS
SYSTOLIC BLOOD PRESSURE: 141 MMHG | DIASTOLIC BLOOD PRESSURE: 93 MMHG | HEART RATE: 73 BPM | BODY MASS INDEX: 44.58 KG/M2 | TEMPERATURE: 100 F | HEIGHT: 64 IN

## 2019-05-07 DIAGNOSIS — R50.9 FEVER, UNSPECIFIED FEVER CAUSE: ICD-10-CM

## 2019-05-07 DIAGNOSIS — I10 ESSENTIAL HYPERTENSION: Primary | ICD-10-CM

## 2019-05-07 DIAGNOSIS — N18.6 ESRD (END STAGE RENAL DISEASE): ICD-10-CM

## 2019-05-07 DIAGNOSIS — L97.929 ULCER OF LEFT LOWER EXTREMITY, UNSPECIFIED ULCER STAGE: ICD-10-CM

## 2019-05-07 DIAGNOSIS — M48.062 SPINAL STENOSIS OF LUMBAR REGION WITH NEUROGENIC CLAUDICATION: ICD-10-CM

## 2019-05-07 PROCEDURE — 99999 PR PBB SHADOW E&M-EST. PATIENT-LVL III: CPT | Mod: PBBFAC,,, | Performed by: INTERNAL MEDICINE

## 2019-05-07 PROCEDURE — 99999 PR PBB SHADOW E&M-EST. PATIENT-LVL III: ICD-10-PCS | Mod: PBBFAC,,, | Performed by: INTERNAL MEDICINE

## 2019-05-07 PROCEDURE — 99214 PR OFFICE/OUTPT VISIT, EST, LEVL IV, 30-39 MIN: ICD-10-PCS | Mod: S$GLB,,, | Performed by: INTERNAL MEDICINE

## 2019-05-07 PROCEDURE — 99214 OFFICE O/P EST MOD 30 MIN: CPT | Mod: S$GLB,,, | Performed by: INTERNAL MEDICINE

## 2019-05-07 RX ORDER — INSULIN LISPRO 100 [IU]/ML
INJECTION, SOLUTION INTRAVENOUS; SUBCUTANEOUS
Qty: 1 BOX | Refills: 3 | Status: SHIPPED | OUTPATIENT
Start: 2019-05-07 | End: 2019-05-28 | Stop reason: SDUPTHER

## 2019-05-07 RX ORDER — ACETAMINOPHEN AND CODEINE PHOSPHATE 300; 30 MG/1; MG/1
1 TABLET ORAL EVERY 6 HOURS PRN
Qty: 90 TABLET | Refills: 2 | Status: SHIPPED | OUTPATIENT
Start: 2019-05-07 | End: 2019-08-08 | Stop reason: SDUPTHER

## 2019-05-07 NOTE — PROGRESS NOTES
CC: followup of hypertension and diabetes  HPI:  The patient is a 63 y.o. year old female who presents to the office for followup of hypertension and diabetes.  The patient denies any chest pain, shortness of breath, headache, blurred vision, excessive fatigue, nausea or vomiting.  She fell a few months ago while walking down the steps.  She states her legs gave out on her.  She complains of right low back pain and right hip pain.  She is having home physical therapy.  She complains of 3 week history of ulcer on left leg.  The patient states her temperature was 101 last night.    PAST MEDICAL HISTORY:  Past Medical History:   Diagnosis Date    Anxiety     Arthritis     DDD, Lumbar    Breast cancer 1/2013    left breast- invasive mammary carcinoma, ER/IL positive, Her2 negative    Carotid artery stenosis 8/13/2018 6/22/2018: Carotid Duplex: SHANELL: Moderate plaquing - 2.0 m/s - <50%. LICA: Moderate plaquing - 1.6 m/s - <50%.    Cataract     Choledocholithiasis     s/p ERCP and stent placement    Chronic obstructive pulmonary disease 6/8/2018    Chronic venous insufficiency     Colon cancer 2001    CRI (chronic renal insufficiency)     one kidney    Dialysis AV fistula malfunction     ESRD (end stage renal disease) on dialysis     Former smoker 6/8/2018    GERD (gastroesophageal reflux disease)     History of acute pancreatitis 2009 2/09 s/p sphincterotomy    History of cerebrovascular accident 6/8/2018    History of colon cancer     s/p sigmoid colectomy    HTN (hypertension), benign     Hypercholesterolemia     Hyperlipidemia     Hypoxemia 6/8/2018    Intracerebral hemorrhage     Kidney stone     left    Lymphedema of both lower extremities     Obesity     Pancreatitis     Pancreatitis 2008    Physical deconditioning     Pulmonary edema     Sacroiliac joint pain     Spinal stenosis     Spinal stenosis, lumbar     Stroke 12/2012    Tobacco abuse     Type 2 diabetes mellitus with  neurological manifestations, controlled     Neuropathy       SURGICAL HISTORY:  Past Surgical History:   Procedure Laterality Date    AMPUTATION-TOE, partial Left 11/25/2016    Performed by Jose Delacruz Jr., DPM at [facility] OR 2ND FLR    BIOPSY, LYMPH NODE, SENTINEL Left 2/25/2013    Performed by Dirk Oneil MD at [facility] OR [facility]    BLOCK-NERVE-MEDIAL BRANCH-LUMBAR Bilateral 3/1/2016    [facility] at [facility]    BLOCK-NERVE-MEDIAL BRANCH-LUMBAR Bilateral 2/23/2016    Performed by [facility] at [facility]    BREAST BIOPSY  1/2012    left breast invasive mammary carcinoma (lateral bx) and intraductal papilloma (medial bx)    BREAST BIOPSY  1999    rt breast FA    CHOLECYSTECTOMY  2001    COLON SURGERY      CREATION -FISTULA-AV Right 5/28/2018    Performed by RICK Pollard III, MD at [facility] OR 2ND [facility]LR    EXTRACTION, CATARACT, WITH IOL INSERTION Left 1/31/2019    Per[facility] by [facility] at [facility] OR    EXTRACTION, CATARACT, WITH IOL INSERTION Right 1/24/2019    Performed by [facility]manuel [facility]ada MD at [facility] OR    HERNIA REPAIR      INJECTION, FOR SENTINEL NODE IDENTIFICATION Left 2/25/2013    Performed by Dirk Oneil MD at [facility] OR [facility]    INJECTION-JOINT Bilateral 1/20/2016    Performed by Stacie Negrete MD at [facility] PAIN MGT    INSERTION, TISSUE EXPANDER, BREAST Left 2/25/2013    Performed by Terry Moreno MD at [facility] OR 2ND [facility]LR    INSERTION-CATHETER-DIALYSIS Right 4/23/2018    Per[facility] by [facility]ae [facility]ero [facility] at [facility]    INSERTION-CATHETER-BRENNEN CATH - C-ARM N/A 11/11/2016    Performed by Bobby Blount Jr., MD at [facility] OR    left nephrectomy      atrophic kidney and hydronephrosis    left oopherectomy  2001    MASTECTOMY  2013    left    MASTECTOMY Left 2/25/2013    Performed by Dirk Oneil MD at [facility] OR [facility]ND [facility]    MASTOPEXY Right 2/25/2013    Performed by Terry Moreno MD at [facility] OR [facility]    NEPHRECTOMY   2011    lap    RADIOFREQUENCY THERMOCOAGULATION (RFTC)-NERVE-MEDIAN BRANCH-LUMBAR Left 3/30/2016    [facility]    RADIOFREQUENCY THERMOCOAGULATION (RFTC)-NERVE-MEDIAN BRANCH-LUMBAR Right 3/16/2016    [facility]    RECONSTRUCTION, BREAST, USING LATISSIMUS DORSI MYOCUTANEOUS FLAP Left 4/11/2013    Performed by Terry [facility]no MD at [facility]    RECONSTRUCTION-NIPPLE Left 1/9/2014    Performed by Terry Moreno MD at [facility] OR [facility]LR    REPAIR, HERNIA, VENTRAL, LAPAROSCOPIC N/A 3/28/2014    Performed by Min Polanco MD at [facility] OR [facility]LR    REVISION Bilateral 1/9/2014    Performed by Terry Moreno MD at [facility]LR    REVISION, AV FISTULA Right 8/15/2018    [facility]    SIGMOIDECTOMY  2001    TOTAL REDUCTION MAMMOPLASTY Right 2013    TRANSPOSITION, VEIN Right 8/15/2018    Performed by RICK Pollard III, MD at [facility] OR [facility] FLR       MEDS:  Medcard reviewed and updated    ALLERGIES: Allergy Card reviewed and updated    SOCIAL HISTORY:   The patient is a nonsmoker.    PE:   APPEARANCE: Well nourished, well developed, in no acute distress.    CHEST: Lungs clear to auscultation with unlabored respirations.  CARDIOVASCULAR: Normal S1, S2. No murmurs. No carotid bruits. No pedal edema.  ABDOMEN: Bowel sounds normal. Not distended. Soft. No tenderness or masses.   MUSCULOSKELETAL:  In wheelchair.   SKIN: Positive ulceration of anterior aspect of left leg distal to knee.  PSYCHIATRIC: The patient is oriented to person, place, and time and has a pleasant affect.        ASSESSMENT/PLAN:  Kylie was seen today for follow-up.    Diagnoses and all orders for this visit:    Essential hypertension  -     blood pressure is elevated, may be secondary to infection    Spinal stenosis of lumbar region with neurogenic claudication  -     acetaminophen-codeine  300-30mg (TYLENOL #3) 300-30 mg Tab; Take 1 tablet by mouth every 6 (six) hours as needed.    ESRD (end stage renal disease)  -     continue hemodialysis    Fever, unspecified fever cause  -     contact nephrologist to schedule blood cultures with dialysis tomorrow    Ulcer of left lower extremity, unspecified ulcer stage  -     continue antibiotics    Other orders  -     insulin lispro (HUMALOG KWIKPEN INSULIN) 100 unit/mL pen; BLOOD GLUCOSE 150-200, INJECT 1 UNITS UNDER THE SKIN, 201-250, 2 UNITS, 251-300, 3 UNITS THREE TIMES A DAY AS DIRECTED          Answers for HPI/ROS submitted by the patient on 5/2/2019   Diabetes problem  Diabetes type: type 1  MedicAlert ID: No  Disease duration: 2017 years  blurred vision: No  chest pain: No  fatigue: Yes  foot paresthesias: Yes  foot ulcerations: No  polydipsia: No  polyphagia: No  polyuria: No  visual change: No  weakness: Yes  weight loss: No  Symptom course: stable  confusion: No  dizziness: No  headaches: No  hunger: No  mood changes: No  nervous/anxious: No  pallor: No  seizures: No  sleepiness: No  speech difficulty: No  sweats: No  tremors: No  blackouts: No  hospitalization: No  nocturnal hypoglycemia: No  required assistance: No  required glucagon: No  CVA: Yes  heart disease: Yes  impotence: No  nephropathy: Yes  peripheral neuropathy: No  PVD: No  retinopathy: No  autonomic neuropathy: No  CAD risks: dyslipidemia, family history, hypertension, obesity, sedentary lifestyle, tobacco exposure  Current treatments: diet, insulin injections, oral agent (dual therapy)  Treatment compliance: most of the time  Dose schedule: pre-breakfast, at bedtime  Given by: patient  Injection sites: abdominal wall  Home blood tests: 1-2 x per day  Home urines: <1 x per month  Monitoring compliance: good  Blood glucose trend: decreasing steadily  Weight trend: stable  Current diet: diabetic, generally healthy  Meal planning: avoidance of concentrated sweets  Exercise: three times a  week  Dietitian visit: No  Eye exam current: Yes  Sees podiatrist: Yes

## 2019-05-14 ENCOUNTER — OFFICE VISIT (OUTPATIENT)
Dept: VASCULAR SURGERY | Facility: CLINIC | Age: 64
End: 2019-05-14
Payer: MEDICARE

## 2019-05-14 ENCOUNTER — HOSPITAL ENCOUNTER (OUTPATIENT)
Dept: VASCULAR SURGERY | Facility: CLINIC | Age: 64
Discharge: HOME | End: 2019-05-14
Attending: SURGERY
Payer: MEDICARE

## 2019-05-14 VITALS
DIASTOLIC BLOOD PRESSURE: 87 MMHG | RESPIRATION RATE: 18 BRPM | TEMPERATURE: 98 F | WEIGHT: 256.63 LBS | BODY MASS INDEX: 43.81 KG/M2 | HEIGHT: 64 IN | HEART RATE: 66 BPM | SYSTOLIC BLOOD PRESSURE: 155 MMHG

## 2019-05-14 DIAGNOSIS — Z99.2 ESRD (END STAGE RENAL DISEASE) ON DIALYSIS: ICD-10-CM

## 2019-05-14 DIAGNOSIS — Z99.2 ESRD (END STAGE RENAL DISEASE) ON DIALYSIS: Primary | ICD-10-CM

## 2019-05-14 DIAGNOSIS — N18.6 ESRD (END STAGE RENAL DISEASE) ON DIALYSIS: ICD-10-CM

## 2019-05-14 DIAGNOSIS — N18.6 ESRD (END STAGE RENAL DISEASE) ON DIALYSIS: Primary | ICD-10-CM

## 2019-05-14 PROCEDURE — 99213 OFFICE O/P EST LOW 20 MIN: CPT | Mod: PBBFAC | Performed by: SURGERY

## 2019-05-14 PROCEDURE — 99999 PR PBB SHADOW E&M-EST. PATIENT-LVL III: ICD-10-PCS | Mod: PBBFAC,,, | Performed by: SURGERY

## 2019-05-14 PROCEDURE — 93990 DOPPLER FLOW TESTING: CPT | Mod: PBBFAC | Performed by: SURGERY

## 2019-05-14 PROCEDURE — 99214 PR OFFICE/OUTPT VISIT, EST, LEVL IV, 30-39 MIN: ICD-10-PCS | Mod: S$GLB,,, | Performed by: SURGERY

## 2019-05-14 PROCEDURE — 93990 PR DUPLEX HEMODIALYSIS ACCESS: ICD-10-PCS | Mod: S$GLB,,, | Performed by: SURGERY

## 2019-05-14 PROCEDURE — 99214 OFFICE O/P EST MOD 30 MIN: CPT | Mod: S$GLB,,, | Performed by: SURGERY

## 2019-05-14 PROCEDURE — 93990 DOPPLER FLOW TESTING: CPT | Mod: S$GLB,,, | Performed by: SURGERY

## 2019-05-14 PROCEDURE — 99999 PR PBB SHADOW E&M-EST. PATIENT-LVL III: CPT | Mod: PBBFAC,,, | Performed by: SURGERY

## 2019-05-14 NOTE — PROGRESS NOTES
Ref: CLEMENTINA Siegel    HISTORY OF PRESENT ILLNESS:  A 63-year-old female with new end-stage renal   disease,  Here for f/u.  It sounds as though her renal failure came on rather suddenly and was   hospitalized for some time.  She is still using supplemental oxygen, but she is   feeling better and can now lie flat.    Now s/p    1. PTA, R AVF 9/12/18  2. Transposition, R AVF 8/15/18  3. Diagnostic fistulagram 7/18/18  4. R lincoln AVF 5/28/18.     This is a 4 month f/u.  No issues with the AVF    PAST MEDICAL HISTORY:  1.  History of breast cancer.  2.  History of choledocholithiasis.  3.  Hypertension.  4.  Hyperlipidemia.  5.  History of intracerebral hemorrhage.  6.  History of pancreatitis.  7.  History of stroke.  8.  Type 2 diabetes.  9. ESRD dialyzing Tue/Thur/Sat  10. Severe COPD on home o2    PAST SURGICAL HISTORY:  1.  Sigmoidectomy.  2.  Cholecystectomy.  3.  Nephrectomy.  4.  Breast biopsies.    FAMILY HISTORY:  Positive for diabetes and heart disease.    SOCIAL HISTORY:  Current smoker.    MEDICATIONS:  No anticoagulants or antiplatelet agents.  See EPIC for full list.    ALLERGIES:  KEFLEX, ERYTHROMYCIN AND CYCLOBENZAPRINE.    REVIEW OF SYSTEMS:  Denies postprandial pain or DVT.  All other systems   including eyes, ENT, respiratory, musculoskeletal, skin, psychiatric, heme,   lymph, allergy and immune are negative.    PHYSICAL EXAMINATION:  VITAL SIGNS:  See nursing note.  GENERAL:  She appears somewhat chronically ill using supplemental oxygen.    Resume normal effort.  Clear to auscultation.  CARDIAC:  Regular rate and rhythm.  Nondisplaced PMI, no murmur.  VASCULAR:  2+ radial and brachial pulses.  EXTREMITIES:  Her arms are somewhat generous in girth.  R arm shows good  Thrill proximally, soft thrill in mid  with modest pulsatility.  Strong 2+ radial proximal to anastomosis which is quite distal.  stable     IMAGING ~ 4 wks ago.  No stenosis, flow rate  955 (prior 701 (prior 607 (prior 273(    Fistualgrams  re-reviewed.   Small radial artery with large cephalic vein.   Do not feel there is a arterial stenosis    ASSESSMENT:  Well Functioning R AVF     PLAN:  1. Lengthen surveillance, f/u 6 months with AVF duplex if clinically indicated    CC: CLEMENTINA Pollard III, MD, FACS  Professor and Chief, Vascular and Endovascular Surgery

## 2019-05-15 ENCOUNTER — PATIENT MESSAGE (OUTPATIENT)
Dept: INTERNAL MEDICINE | Facility: CLINIC | Age: 64
End: 2019-05-15

## 2019-05-15 RX ORDER — ERGOCALCIFEROL 1.25 MG/1
50000 CAPSULE ORAL
Qty: 12 CAPSULE | Refills: 0 | Status: SHIPPED | OUTPATIENT
Start: 2019-05-15 | End: 2019-07-18 | Stop reason: SDUPTHER

## 2019-05-24 ENCOUNTER — PATIENT MESSAGE (OUTPATIENT)
Dept: INTERNAL MEDICINE | Facility: CLINIC | Age: 64
End: 2019-05-24

## 2019-05-27 ENCOUNTER — TELEPHONE (OUTPATIENT)
Dept: INTERNAL MEDICINE | Facility: CLINIC | Age: 64
End: 2019-05-27

## 2019-05-28 RX ORDER — INSULIN LISPRO 100 [IU]/ML
INJECTION, SOLUTION INTRAVENOUS; SUBCUTANEOUS
Qty: 1 BOX | Refills: 3 | Status: SHIPPED | OUTPATIENT
Start: 2019-05-28 | End: 2019-05-29 | Stop reason: SDUPTHER

## 2019-05-29 RX ORDER — INSULIN LISPRO 100 [IU]/ML
INJECTION, SOLUTION INTRAVENOUS; SUBCUTANEOUS
Qty: 1 BOX | Refills: 3 | Status: SHIPPED | OUTPATIENT
Start: 2019-05-29 | End: 2021-01-01

## 2019-05-30 ENCOUNTER — TELEPHONE (OUTPATIENT)
Dept: INTERNAL MEDICINE | Facility: CLINIC | Age: 64
End: 2019-05-30

## 2019-06-20 ENCOUNTER — PATIENT MESSAGE (OUTPATIENT)
Dept: INTERNAL MEDICINE | Facility: CLINIC | Age: 64
End: 2019-06-20

## 2019-06-21 RX ORDER — INSULIN LISPRO 100 [IU]/ML
INJECTION, SOLUTION INTRAVENOUS; SUBCUTANEOUS
Qty: 45 ML | Refills: 3 | Status: ON HOLD | OUTPATIENT
Start: 2019-06-21 | End: 2020-10-15

## 2019-06-25 ENCOUNTER — PATIENT MESSAGE (OUTPATIENT)
Dept: INTERNAL MEDICINE | Facility: CLINIC | Age: 64
End: 2019-06-25

## 2019-06-26 RX ORDER — DICLOFENAC SODIUM 10 MG/G
2 GEL TOPICAL 4 TIMES DAILY
Qty: 100 G | Refills: 3 | Status: SHIPPED | OUTPATIENT
Start: 2019-06-26 | End: 2020-06-25 | Stop reason: SDUPTHER

## 2019-07-19 RX ORDER — ERGOCALCIFEROL 1.25 MG/1
CAPSULE ORAL
Qty: 12 CAPSULE | Refills: 0 | Status: SHIPPED | OUTPATIENT
Start: 2019-07-19 | End: 2019-08-08 | Stop reason: SDUPTHER

## 2019-08-08 DIAGNOSIS — M48.062 SPINAL STENOSIS OF LUMBAR REGION WITH NEUROGENIC CLAUDICATION: ICD-10-CM

## 2019-08-08 RX ORDER — ACETAMINOPHEN AND CODEINE PHOSPHATE 300; 30 MG/1; MG/1
1 TABLET ORAL EVERY 6 HOURS PRN
Qty: 90 TABLET | Refills: 2 | Status: SHIPPED | OUTPATIENT
Start: 2019-08-08 | End: 2019-11-21 | Stop reason: SDUPTHER

## 2019-08-08 RX ORDER — ERGOCALCIFEROL 1.25 MG/1
50000 CAPSULE ORAL
Qty: 12 CAPSULE | Refills: 0 | Status: SHIPPED | OUTPATIENT
Start: 2019-08-08 | End: 2019-10-10 | Stop reason: SDUPTHER

## 2019-08-13 ENCOUNTER — LAB VISIT (OUTPATIENT)
Dept: LAB | Facility: HOSPITAL | Age: 64
End: 2019-08-13
Attending: INTERNAL MEDICINE
Payer: COMMERCIAL

## 2019-08-13 ENCOUNTER — OFFICE VISIT (OUTPATIENT)
Dept: INTERNAL MEDICINE | Facility: CLINIC | Age: 64
End: 2019-08-13
Payer: COMMERCIAL

## 2019-08-13 VITALS
HEIGHT: 64 IN | BODY MASS INDEX: 44.11 KG/M2 | HEART RATE: 70 BPM | DIASTOLIC BLOOD PRESSURE: 83 MMHG | TEMPERATURE: 99 F | RESPIRATION RATE: 16 BRPM | SYSTOLIC BLOOD PRESSURE: 124 MMHG | WEIGHT: 258.38 LBS

## 2019-08-13 DIAGNOSIS — N18.6 ESRD (END STAGE RENAL DISEASE): ICD-10-CM

## 2019-08-13 DIAGNOSIS — L03.90 CELLULITIS, UNSPECIFIED CELLULITIS SITE: ICD-10-CM

## 2019-08-13 DIAGNOSIS — M25.551 RIGHT HIP PAIN: ICD-10-CM

## 2019-08-13 DIAGNOSIS — I10 ESSENTIAL HYPERTENSION: Primary | ICD-10-CM

## 2019-08-13 LAB
ESTIMATED AVG GLUCOSE: 174 MG/DL (ref 68–131)
HBA1C MFR BLD HPLC: 7.7 % (ref 4–5.6)

## 2019-08-13 PROCEDURE — 99213 OFFICE O/P EST LOW 20 MIN: CPT | Mod: PBBFAC,PO | Performed by: INTERNAL MEDICINE

## 2019-08-13 PROCEDURE — 99999 PR PBB SHADOW E&M-EST. PATIENT-LVL III: ICD-10-PCS | Mod: PBBFAC,,, | Performed by: INTERNAL MEDICINE

## 2019-08-13 PROCEDURE — 99214 PR OFFICE/OUTPT VISIT, EST, LEVL IV, 30-39 MIN: ICD-10-PCS | Mod: S$GLB,,, | Performed by: INTERNAL MEDICINE

## 2019-08-13 PROCEDURE — 80048 BASIC METABOLIC PNL TOTAL CA: CPT

## 2019-08-13 PROCEDURE — 36415 COLL VENOUS BLD VENIPUNCTURE: CPT | Mod: PO

## 2019-08-13 PROCEDURE — 83036 HEMOGLOBIN GLYCOSYLATED A1C: CPT

## 2019-08-13 PROCEDURE — 99214 OFFICE O/P EST MOD 30 MIN: CPT | Mod: S$GLB,,, | Performed by: INTERNAL MEDICINE

## 2019-08-13 PROCEDURE — 99999 PR PBB SHADOW E&M-EST. PATIENT-LVL III: CPT | Mod: PBBFAC,,, | Performed by: INTERNAL MEDICINE

## 2019-08-13 RX ORDER — CLINDAMYCIN HYDROCHLORIDE 300 MG/1
300 CAPSULE ORAL EVERY 6 HOURS
Qty: 56 CAPSULE | Refills: 0 | Status: SHIPPED | OUTPATIENT
Start: 2019-08-13 | End: 2019-08-27

## 2019-08-13 NOTE — PROGRESS NOTES
CC: followup of hypertension  HPI:  The patient is a 63 y.o. year old female who presents to the office for followup of hypertension.  The patient denies any chest pain, shortness of breath, headache, blurred vision, excessive fatigue, nausea or vomiting.  She has been grieving the sudden death of her mother July 4th.  She complains of muscle spasms left chest wall that worsened over the last 2 weeks.  She reports sensation has been present for years.  The patient complains of continued back pain, which she attributes to her dialysis chair.  She complains of continued right hip pain with difficulty ambulating.  She reports blood sugars have been okay.  She does have some redness of her right leg that has responded well to clindamycin in the past.    PAST MEDICAL HISTORY:  Past Medical History:   Diagnosis Date    Anxiety     Arthritis     DDD, Lumbar    Breast cancer 1/2013    left breast- invasive mammary carcinoma, ER/VA positive, Her2 negative    Carotid artery stenosis 8/13/2018 6/22/2018: Carotid Duplex: SHANELL: Moderate plaquing - 2.0 m/s - <50%. LICA: Moderate plaquing - 1.6 m/s - <50%.    Cataract     Choledocholithiasis     s/p ERCP and stent placement    Chronic obstructive pulmonary disease 6/8/2018    Chronic venous insufficiency     Colon cancer 2001    CRI (chronic renal insufficiency)     one kidney    Dialysis AV fistula malfunction     ESRD (end stage renal disease) on dialysis     Former smoker 6/8/2018    GERD (gastroesophageal reflux disease)     History of acute pancreatitis 2009 2/09 s/p sphincterotomy    History of cerebrovascular accident 6/8/2018    History of colon cancer     s/p sigmoid colectomy    HTN (hypertension), benign     Hypercholesterolemia     Hyperlipidemia     Hypoxemia 6/8/2018    Intracerebral hemorrhage     Kidney stone     left    Lymphedema of both lower extremities     Obesity     Pancreatitis     Pancreatitis 2008    Physical  deconditioning     Pulmonary edema     Sacroiliac joint pain     Spinal stenosis     Spinal stenosis, lumbar     Stroke 12/2012    Tobacco abuse     Type 2 diabetes mellitus with neurological manifestations, controlled     Neuropathy       SURGICAL HISTORY:  Past Surgical History:   Procedure Laterality Date    AMPUTATION-TOE, partial Left 11/25/2016    Performed [facility] OR 2ND FLR    BIOPSY, LYMPH NODE, SENTINEL Left 2/25/2013    Performed by Dirk [facility]il MD at [facility] OR 2ND FLR    BLOCK-NERVE-MEDIAL BRANCH-LUMBAR Bilateral 3/1/2016    [facility] at [facility] MGT    BLOCK-NERVE-MEDIAL BRANCH-LUMBAR Bilateral 2/23/2016    Performed by [facility] at [facility] MGT    BREAST BIOPSY  1/2012    left breast invasive mammary carcinoma (lateral bx) and intraductal papilloma (medial bx)    BREAST BIOPSY  1999    rt breast FA    CHOLECYSTECTOMY  2001    COLON SURGERY      CREATION -FISTULA-AV Right 5/28/2018    Performed by RICK Pollard III, MD at [facility] OR 2ND FLR    EXTRACTION, CATARACT, WITH IOL INSERTION Left 1/31/2019    [facility]    EXTRACTION, CATARACT, WITH IOL INSERTION Right 1/24/2019    [facility]    HERNIA REPAIR      INJECTION, FOR SENTINEL NODE IDENTIFICATION Left 2/25/2013    Performed by [facility]il [facility] at [facility] OR 2ND FLR    INJECTION-JOINT Bilateral 1/20/2016    Performed by [facility] at [facility] MGT    INSERTION, TISSUE EXPANDER, BREAST Left 2/25/2013    Performed by Terry [facility]no MD at [facility] OR 2ND FLR    INSERTION-CATHETER-DIALYSIS Right 4/23/2018    Performed by Jenae Salguero DO at [facility] OR    INSERTION-CATHETER-BRENNEN CATH - C-ARM N/A 11/11/2016    Performed by Bobby Blount [facility]    left nephrectomy      atrophic kidney and hydronephrosis    left oopherectomy  2001    MASTECTOMY  2013    left     MASTECTOMY Left 2/25/2013    [facility] 2ND FLR    MASTOPEXY Right 2/25/2013    [facility]formed by Terry [facility] at [facility]LR    NEPHRECTOMY  2011    lap    RADIOFREQUENCY THERMOCOAGULATION (RFTC)-NERVE-MEDIAN BRANCH-LUMBAR Left 3/30/2016    [facility]    RADIOFREQUENCY THERMOCOAGULATION (RFTC)-NERVE-MEDIAN BRANCH-LUMBAR Right 3/16/2016    [facility]    RECONSTRUCTION, BREAST, WITH LATISSIMUS DORSI MYOCUTANEOUS FLAP Left 4/11/2013    [facility]    RECONSTRUCTION-NIPPLE Left 1/9/2014    [facility]    REPAIR, HERNIA, VENTRAL, LAPAROSCOPIC N/A 3/28/2014    Performed by Min Polanco MD at [facility]LR    REVISION Bilateral 1/9/2014    [facility]    REVISION, AV FISTULA Right 8/15/2018    [facility] R[facility]    SIGMOIDECTOMY  2001    TOTAL REDUCTION MAMMOPLASTY Right 2013    TRANSPOSITION, VEIN Right 8/15/2018    Performed by [facility]       MEDS:  Medcard reviewed and updated    ALLERGIES: Allergy Card reviewed and updated    SOCIAL HISTORY:   The patient is a nonsmoker.    PE:   APPEARANCE: Well nourished, well developed, in no acute distress.    CHEST: Lungs clear to auscultation with unlabored respirations.  CARDIOVASCULAR: Normal S1, S2. No murmurs. No carotid bruits. No pedal edema.  ABDOMEN: Bowel sounds normal. Not distended. Soft. No tenderness or masses.   MUSCULOSKELETAL:  Abormal gait, ambulates with a limp.  SKIN: Erythema of right lower extremity.  PSYCHIATRIC: The patient is oriented to person, place, and time and has a pleasant affect.        ASSESSMENT/PLAN:  Kylie was seen today for follow-up.    Diagnoses and all orders for this  visit:    Essential hypertension  -     blood pressure is controlled    Uncontrolled type 2 diabetes mellitus with stage 4 chronic kidney disease, with long-term current use of insulin  -     Basic metabolic panel; Future  -     Hemoglobin A1c; Future    ESRD (end stage renal disease)  -      continue hemodialysis    Right hip pain  -     MRI Pelvis Without Contrast; Future    Cellulitis, unspecified cellulitis site  -     prescribed clindamycin    Other orders  -     clindamycin (CLEOCIN) 300 MG capsule; Take 1 capsule (300 mg total) by mouth every 6 (six) hours. for 14 days

## 2019-08-14 ENCOUNTER — PATIENT MESSAGE (OUTPATIENT)
Dept: INTERNAL MEDICINE | Facility: CLINIC | Age: 64
End: 2019-08-14

## 2019-08-14 ENCOUNTER — TELEPHONE (OUTPATIENT)
Dept: INTERNAL MEDICINE | Facility: CLINIC | Age: 64
End: 2019-08-14

## 2019-08-14 LAB
ANION GAP SERPL CALC-SCNC: 16 MMOL/L (ref 8–16)
BUN SERPL-MCNC: 43 MG/DL (ref 8–23)
CALCIUM SERPL-MCNC: 9.4 MG/DL (ref 8.7–10.5)
CHLORIDE SERPL-SCNC: 92 MMOL/L (ref 95–110)
CO2 SERPL-SCNC: 30 MMOL/L (ref 23–29)
CREAT SERPL-MCNC: 7.5 MG/DL (ref 0.5–1.4)
EST. GFR  (AFRICAN AMERICAN): 6 ML/MIN/1.73 M^2
EST. GFR  (NON AFRICAN AMERICAN): 5.2 ML/MIN/1.73 M^2
GLUCOSE SERPL-MCNC: 227 MG/DL (ref 70–110)
POTASSIUM SERPL-SCNC: 5.1 MMOL/L (ref 3.5–5.1)
SODIUM SERPL-SCNC: 138 MMOL/L (ref 136–145)

## 2019-08-14 NOTE — TELEPHONE ENCOUNTER
Please inform patient hemoglobin A1c is elevated at 7.7.  Encourage adherence to diet.  Continue insulin with sliding scale coverage.

## 2019-08-30 ENCOUNTER — TELEPHONE (OUTPATIENT)
Dept: HOME HEALTH SERVICES | Facility: HOSPITAL | Age: 64
End: 2019-08-30

## 2019-09-03 ENCOUNTER — EXTERNAL HOME HEALTH (OUTPATIENT)
Dept: HOME HEALTH SERVICES | Facility: HOSPITAL | Age: 64
End: 2019-09-03

## 2019-10-10 RX ORDER — ERGOCALCIFEROL 1.25 MG/1
CAPSULE ORAL
Qty: 12 CAPSULE | Refills: 4 | Status: SHIPPED | OUTPATIENT
Start: 2019-10-10 | End: 2020-03-18 | Stop reason: SDUPTHER

## 2019-10-27 ENCOUNTER — PATIENT MESSAGE (OUTPATIENT)
Dept: INTERNAL MEDICINE | Facility: CLINIC | Age: 64
End: 2019-10-27

## 2019-10-28 ENCOUNTER — TELEPHONE (OUTPATIENT)
Dept: INTERNAL MEDICINE | Facility: CLINIC | Age: 64
End: 2019-10-28

## 2019-10-29 NOTE — TELEPHONE ENCOUNTER
Returned patient's call.  Left message to call the office.  Please advise if patient has attempted taking gabapentin after dialysis on dialysis days.  If not, does she feel that that something she may be able to do?  If she does not think that that is a viable option, I will see if it is okay to increase the dose.

## 2019-10-30 ENCOUNTER — TELEPHONE (OUTPATIENT)
Dept: INTERNAL MEDICINE | Facility: CLINIC | Age: 64
End: 2019-10-30

## 2019-10-30 NOTE — TELEPHONE ENCOUNTER
Taking Gabapentin 400 mg 2x a day ands he was sleeping daily then she was suggested to take 300 mg on Dialysis days and she was taking  tylenol 3 she advised the suggestion was that she take that 300 mg/ Termed the call

## 2019-11-21 ENCOUNTER — LAB VISIT (OUTPATIENT)
Dept: LAB | Facility: HOSPITAL | Age: 64
End: 2019-11-21
Attending: INTERNAL MEDICINE
Payer: MEDICARE

## 2019-11-21 ENCOUNTER — OFFICE VISIT (OUTPATIENT)
Dept: INTERNAL MEDICINE | Facility: CLINIC | Age: 64
End: 2019-11-21
Payer: MEDICARE

## 2019-11-21 ENCOUNTER — TELEPHONE (OUTPATIENT)
Dept: INTERNAL MEDICINE | Facility: CLINIC | Age: 64
End: 2019-11-21

## 2019-11-21 VITALS
RESPIRATION RATE: 18 BRPM | SYSTOLIC BLOOD PRESSURE: 147 MMHG | HEART RATE: 75 BPM | DIASTOLIC BLOOD PRESSURE: 93 MMHG | HEIGHT: 64 IN | WEIGHT: 263.69 LBS | TEMPERATURE: 99 F | BODY MASS INDEX: 45.02 KG/M2

## 2019-11-21 DIAGNOSIS — N18.6 ESRD (END STAGE RENAL DISEASE) ON DIALYSIS: ICD-10-CM

## 2019-11-21 DIAGNOSIS — Z85.3 HISTORY OF BREAST CANCER: ICD-10-CM

## 2019-11-21 DIAGNOSIS — I10 ESSENTIAL HYPERTENSION: Primary | ICD-10-CM

## 2019-11-21 DIAGNOSIS — Z99.2 ESRD (END STAGE RENAL DISEASE) ON DIALYSIS: ICD-10-CM

## 2019-11-21 DIAGNOSIS — I10 ESSENTIAL HYPERTENSION: ICD-10-CM

## 2019-11-21 DIAGNOSIS — M48.062 SPINAL STENOSIS OF LUMBAR REGION WITH NEUROGENIC CLAUDICATION: ICD-10-CM

## 2019-11-21 LAB
ALBUMIN SERPL BCP-MCNC: 3.5 G/DL (ref 3.5–5.2)
ALP SERPL-CCNC: 96 U/L (ref 55–135)
ALT SERPL W/O P-5'-P-CCNC: 12 U/L (ref 10–44)
ANION GAP SERPL CALC-SCNC: 16 MMOL/L (ref 8–16)
AST SERPL-CCNC: 11 U/L (ref 10–40)
BILIRUB SERPL-MCNC: 0.5 MG/DL (ref 0.1–1)
BUN SERPL-MCNC: 45 MG/DL (ref 8–23)
CALCIUM SERPL-MCNC: 9 MG/DL (ref 8.7–10.5)
CHLORIDE SERPL-SCNC: 94 MMOL/L (ref 95–110)
CHOLEST SERPL-MCNC: 157 MG/DL (ref 120–199)
CHOLEST/HDLC SERPL: 4.6 {RATIO} (ref 2–5)
CO2 SERPL-SCNC: 27 MMOL/L (ref 23–29)
CREAT SERPL-MCNC: 7.6 MG/DL (ref 0.5–1.4)
EST. GFR  (AFRICAN AMERICAN): 5.9 ML/MIN/1.73 M^2
EST. GFR  (NON AFRICAN AMERICAN): 5.1 ML/MIN/1.73 M^2
GLUCOSE SERPL-MCNC: 284 MG/DL (ref 70–110)
HDLC SERPL-MCNC: 34 MG/DL (ref 40–75)
HDLC SERPL: 21.7 % (ref 20–50)
LDLC SERPL CALC-MCNC: 72.2 MG/DL (ref 63–159)
NONHDLC SERPL-MCNC: 123 MG/DL
POTASSIUM SERPL-SCNC: 4.6 MMOL/L (ref 3.5–5.1)
PROT SERPL-MCNC: 7.5 G/DL (ref 6–8.4)
SODIUM SERPL-SCNC: 137 MMOL/L (ref 136–145)
TRIGL SERPL-MCNC: 254 MG/DL (ref 30–150)

## 2019-11-21 PROCEDURE — 83036 HEMOGLOBIN GLYCOSYLATED A1C: CPT

## 2019-11-21 PROCEDURE — 99213 OFFICE O/P EST LOW 20 MIN: CPT | Mod: S$GLB,,, | Performed by: INTERNAL MEDICINE

## 2019-11-21 PROCEDURE — 99213 PR OFFICE/OUTPT VISIT, EST, LEVL III, 20-29 MIN: ICD-10-PCS | Mod: S$GLB,,, | Performed by: INTERNAL MEDICINE

## 2019-11-21 PROCEDURE — 99214 OFFICE O/P EST MOD 30 MIN: CPT | Mod: PBBFAC,PO | Performed by: INTERNAL MEDICINE

## 2019-11-21 PROCEDURE — 99999 PR PBB SHADOW E&M-EST. PATIENT-LVL IV: CPT | Mod: PBBFAC,,, | Performed by: INTERNAL MEDICINE

## 2019-11-21 PROCEDURE — 80061 LIPID PANEL: CPT

## 2019-11-21 PROCEDURE — 99999 PR PBB SHADOW E&M-EST. PATIENT-LVL IV: ICD-10-PCS | Mod: PBBFAC,,, | Performed by: INTERNAL MEDICINE

## 2019-11-21 PROCEDURE — 80053 COMPREHEN METABOLIC PANEL: CPT

## 2019-11-21 PROCEDURE — 36415 COLL VENOUS BLD VENIPUNCTURE: CPT | Mod: PO

## 2019-11-21 RX ORDER — FAMOTIDINE 20 MG/1
20 TABLET, FILM COATED ORAL NIGHTLY PRN
Qty: 90 TABLET | Refills: 3 | Status: SHIPPED | OUTPATIENT
Start: 2019-11-21 | End: 2020-03-18 | Stop reason: SDUPTHER

## 2019-11-21 RX ORDER — LIDOCAINE 50 MG/G
1 PATCH TOPICAL DAILY
Qty: 30 PATCH | Refills: 3 | Status: SHIPPED | OUTPATIENT
Start: 2019-11-21 | End: 2020-06-25

## 2019-11-21 RX ORDER — ACETAMINOPHEN AND CODEINE PHOSPHATE 300; 30 MG/1; MG/1
1 TABLET ORAL EVERY 6 HOURS PRN
Qty: 90 TABLET | Refills: 2 | Status: SHIPPED | OUTPATIENT
Start: 2019-11-21 | End: 2020-07-10

## 2019-11-21 NOTE — PROGRESS NOTES
CC: followup of hypertension  HPI:  The patient is a 64 y.o. year old female who presents to the office for followup of hypertension.  The patient denies any chest pain, headache, blurred vision, excessive fatigue, nausea or vomiting, but reports intermittent shortness of breath.  She complains of a lot of pain, especially her left knee.  She reports difficulty standing.  She also complains of upper back and shoulder pain.  Ambulatory blood pressures have been good.    PAST MEDICAL HISTORY:  Past Medical History:   Diagnosis Date    Anxiety     Arthritis     DDD, Lumbar    Breast cancer 1/2013    left breast- invasive mammary carcinoma, ER/ID positive, Her2 negative    Carotid artery stenosis 8/13/2018 6/22/2018: Carotid Duplex: SHANELL: Moderate plaquing - 2.0 m/s - <50%. LICA: Moderate plaquing - 1.6 m/s - <50%.    Cataract     Choledocholithiasis     s/p ERCP and stent placement    Chronic obstructive pulmonary disease 6/8/2018    Chronic venous insufficiency     Colon cancer 2001    CRI (chronic renal insufficiency)     one kidney    Dialysis AV fistula malfunction     ESRD (end stage renal disease) on dialysis     Former smoker 6/8/2018    GERD (gastroesophageal reflux disease)     History of acute pancreatitis 2009 2/09 s/p sphincterotomy    History of cerebrovascular accident 6/8/2018    History of colon cancer     s/p sigmoid colectomy    HTN (hypertension), benign     Hypercholesterolemia     Hyperlipidemia     Hypoxemia 6/8/2018    Intracerebral hemorrhage     Kidney stone     left    Lymphedema of both lower extremities     Obesity     Pancreatitis     Pancreatitis 2008    Physical deconditioning     Pulmonary edema     Sacroiliac joint pain     Spinal stenosis     Spinal stenosis, lumbar     Stroke 12/2012    Tobacco abuse     Type 2 diabetes mellitus with neurological manifestations, controlled     Neuropathy       SURGICAL HISTORY:  Past Surgical History:    Procedure Laterality Date    AV FISTULA PLACEMENT Right 5/28/2018    Procedure: CREATION -FISTULA-AV;  Surgeon: RICK Pollard III, MD;  [facility];  Service: Peripheral Vascular;  Laterality: Right;    BREAST BIOPSY  1/2012    left breast invasive mammary carcinoma (lateral bx) and intraductal papilloma (medial bx)    BREAST BIOPSY  1999    rt breast FA    CATARACT EXTRACTION W/  INTRAOCULAR LENS IMPLANT Right 1/24/2019        CATARACT EXTRACTION W/  INTRAOCULAR LENS IMPLANT Left 1/31/2019    Procedure: EXTRACTION, CATARACT, WITH IOL INSERTION;  Surgeon: Immanuel Moncada MD;  Location: [facility] OR;  Service: Ophthalmology;  Laterality: Left;    CHOLECYSTECTOMY  2001    COLON SURGERY      HERNIA REPAIR      left nephrectomy      atrophic kidney and hydronephrosis    left oopherectomy  2001    MASTECTOMY  2013    left    NEPHRECTOMY  2011    lap    REVISION OF ARTERIOVENOUS FISTULA Right 8/15/2018    Procedure: REVISION, AV FISTULA;  Surgeon: RICK Pollard III, MD;  [facility];  Service: Peripheral Vascular;  Laterality: Right;    SIGMOIDECTOMY  2001    TOTAL REDUCTION MAMMOPLASTY Right 2013       MEDS:  Medcard reviewed and updated    ALLERGIES: Allergy Card reviewed and updated    SOCIAL HISTORY:   The patient is a nonsmoker.    PE:   APPEARANCE: Well nourished, well developed, in no acute distress.    CHEST: Lungs clear to auscultation with unlabored respirations.  CARDIOVASCULAR: Normal S1, S2. No murmurs. No carotid bruits. No pedal edema.  ABDOMEN: Bowel sounds normal. Not distended. Soft. No tenderness or masses.   MUSCULOSKELETAL:  In wheelchair.  PSYCHIATRIC: The patient is oriented to person, place, and time and has a pleasant affect.        ASSESSMENT/PLAN:  Kylie was seen today for follow-up.    Diagnoses and all orders for this visit:    Essential hypertension  -     blood pressure is mildly elevated, but patient reports normotensive with  dialysis    Spinal stenosis of lumbar region with neurogenic claudication  -     acetaminophen-codeine 300-30mg (TYLENOL #3) 300-30 mg Tab; Take 1 tablet by mouth every 6 (six) hours as needed.    History of breast cancer  -     Ambulatory Referral to Hematology / Oncology    Other orders  -     Cancel: influenza (QUADRIVALENT PF) vaccine 0.5 mL  -     lidocaine (LIDODERM) 5 %; Place 1 patch onto the skin once daily. Apply to back daily for 12 hours, then remove for 12 hours.  -     famotidine (PEPCID) 20 MG tablet; Take 1 tablet (20 mg total) by mouth nightly as needed.

## 2019-11-22 LAB
ESTIMATED AVG GLUCOSE: 237 MG/DL (ref 68–131)
HBA1C MFR BLD HPLC: 9.9 % (ref 4–5.6)

## 2019-12-03 ENCOUNTER — TELEPHONE (OUTPATIENT)
Dept: INTERNAL MEDICINE | Facility: CLINIC | Age: 64
End: 2019-12-03

## 2019-12-03 ENCOUNTER — PATIENT MESSAGE (OUTPATIENT)
Dept: INTERNAL MEDICINE | Facility: CLINIC | Age: 64
End: 2019-12-03

## 2019-12-03 NOTE — TELEPHONE ENCOUNTER
Please inform patient that hemoglobin A1c is elevated at 9.9 with elevated glucose.  Please advise if she has had any change in her diet or medication regimen recently

## 2020-01-14 ENCOUNTER — PATIENT MESSAGE (OUTPATIENT)
Dept: INTERNAL MEDICINE | Facility: CLINIC | Age: 65
End: 2020-01-14

## 2020-01-15 ENCOUNTER — TELEPHONE (OUTPATIENT)
Dept: INTERNAL MEDICINE | Facility: CLINIC | Age: 65
End: 2020-01-15

## 2020-01-15 DIAGNOSIS — Z12.31 ENCOUNTER FOR SCREENING MAMMOGRAM FOR BREAST CANCER: Primary | ICD-10-CM

## 2020-02-04 ENCOUNTER — PATIENT OUTREACH (OUTPATIENT)
Dept: ADMINISTRATIVE | Facility: HOSPITAL | Age: 65
End: 2020-02-04

## 2020-02-18 ENCOUNTER — OFFICE VISIT (OUTPATIENT)
Dept: INTERNAL MEDICINE | Facility: CLINIC | Age: 65
End: 2020-02-18
Payer: MEDICARE

## 2020-02-18 VITALS
HEIGHT: 64 IN | WEIGHT: 271.19 LBS | RESPIRATION RATE: 18 BRPM | SYSTOLIC BLOOD PRESSURE: 147 MMHG | BODY MASS INDEX: 46.3 KG/M2 | DIASTOLIC BLOOD PRESSURE: 80 MMHG | HEART RATE: 75 BPM | TEMPERATURE: 99 F

## 2020-02-18 DIAGNOSIS — L03.90 CELLULITIS, UNSPECIFIED CELLULITIS SITE: ICD-10-CM

## 2020-02-18 DIAGNOSIS — J20.9 ACUTE BRONCHITIS, UNSPECIFIED ORGANISM: ICD-10-CM

## 2020-02-18 DIAGNOSIS — I10 ESSENTIAL HYPERTENSION: Primary | ICD-10-CM

## 2020-02-18 DIAGNOSIS — R53.83 FATIGUE, UNSPECIFIED TYPE: ICD-10-CM

## 2020-02-18 DIAGNOSIS — N18.6 ESRD (END STAGE RENAL DISEASE): ICD-10-CM

## 2020-02-18 PROCEDURE — 99214 PR OFFICE/OUTPT VISIT, EST, LEVL IV, 30-39 MIN: ICD-10-PCS | Mod: S$GLB,,, | Performed by: INTERNAL MEDICINE

## 2020-02-18 PROCEDURE — 99214 OFFICE O/P EST MOD 30 MIN: CPT | Mod: S$GLB,,, | Performed by: INTERNAL MEDICINE

## 2020-02-18 PROCEDURE — 99999 PR PBB SHADOW E&M-EST. PATIENT-LVL IV: ICD-10-PCS | Mod: PBBFAC,,, | Performed by: INTERNAL MEDICINE

## 2020-02-18 PROCEDURE — 99999 PR PBB SHADOW E&M-EST. PATIENT-LVL IV: CPT | Mod: PBBFAC,,, | Performed by: INTERNAL MEDICINE

## 2020-02-18 RX ORDER — DOXYCYCLINE HYCLATE 100 MG
100 TABLET ORAL 2 TIMES DAILY
Qty: 14 TABLET | Refills: 0 | Status: SHIPPED | OUTPATIENT
Start: 2020-02-18 | End: 2020-06-25

## 2020-02-18 RX ORDER — ALBUTEROL SULFATE 90 UG/1
2 AEROSOL, METERED RESPIRATORY (INHALATION) EVERY 6 HOURS PRN
Qty: 18 G | Refills: 3 | Status: SHIPPED | OUTPATIENT
Start: 2020-02-18 | End: 2020-06-25 | Stop reason: SDUPTHER

## 2020-02-18 RX ORDER — INSULIN LISPRO 100 [IU]/ML
INJECTION, SOLUTION INTRAVENOUS; SUBCUTANEOUS
Status: ON HOLD | COMMUNITY
Start: 2018-10-11 | End: 2021-02-06 | Stop reason: HOSPADM

## 2020-02-18 NOTE — PROGRESS NOTES
CC: followup of hypertension and diabetes  HPI:  The patient is a 64 y.o. year old female who presents to the office for followup of hypertension and diabetes.  The patient denies any chest pain, headache, blurred vision, nausea or vomiting, but complains of fatigue.  She complains shortness of breath, wheezing and productive cough.  She also reports fever Sunday, up to 101.  Her symptoms started about 2 weeks ago.      PAST MEDICAL HISTORY:  Past Medical History:   Diagnosis Date    Anxiety     Arthritis     DDD, Lumbar    Breast cancer 1/2013    left breast- invasive mammary carcinoma, ER/CO positive, Her2 negative    Carotid artery stenosis 8/13/2018 6/22/2018: Carotid Duplex: SHANELL: Moderate plaquing - 2.0 m/s - <50%. LICA: Moderate plaquing - 1.6 m/s - <50%.    Cataract     Choledocholithiasis     s/p ERCP and stent placement    Chronic obstructive pulmonary disease 6/8/2018    Chronic venous insufficiency     Colon cancer 2001    CRI (chronic renal insufficiency)     one kidney    Dialysis AV fistula malfunction     ESRD (end stage renal disease) on dialysis     Former smoker 6/8/2018    GERD (gastroesophageal reflux disease)     History of acute pancreatitis 2009 2/09 s/p sphincterotomy    History of cerebrovascular accident 6/8/2018    History of colon cancer     s/p sigmoid colectomy    HTN (hypertension), benign     Hypercholesterolemia     Hyperlipidemia     Hypoxemia 6/8/2018    Intracerebral hemorrhage     Kidney stone     left    Lymphedema of both lower extremities     Obesity     Pancreatitis     Pancreatitis 2008    Physical deconditioning     Pulmonary edema     Sacroiliac joint pain     Spinal stenosis     Spinal stenosis, lumbar     Stroke 12/2012    Tobacco abuse     Type 2 diabetes mellitus with neurological manifestations, controlled     Neuropathy       SURGICAL HISTORY:  Past Surgical History:   Procedure Laterality Date    AV FISTULA PLACEMENT Right  5/28/2018    Procedure: CREATION -FISTULA-AV;  Surgeon: RICK Pollard III, MD;  [facility];  Service: Peripheral Vascular;  Laterality: Right;    BREAST BIOPSY  1/2012    left breast invasive mammary carcinoma (lateral bx) and intraductal papilloma (medial bx)    BREAST BIOPSY  1999    rt breast FA    CATARACT EXTRACTION W/  INTRAOCULAR LENS IMPLANT Right 1/24/2019        CATARACT EXTRACTION W/  INTRAOCULAR LENS IMPLANT Left 1/31/2019    Procedure: EXTRACTION, CATARACT, WITH IOL INSERTION;  Surgeon: Immanuel Moncada MD;  Location: [facility];  Service: Ophthalmology;  Laterality: Left;    CHOLECYSTECTOMY  2001    COLON SURGERY      HERNIA REPAIR      left nephrectomy      atrophic kidney and hydronephrosis    left oopherectomy  2001    MASTECTOMY  2013    left    NEPHRECTOMY  2011    lap    REVISION OF ARTERIOVENOUS FISTULA Right 8/15/2018    Procedure: REVISION, AV FISTULA;  Surgeon: RICK Pollard III, MD;  [facility];  Service: Peripheral Vascular;  Laterality: Right;    SIGMOIDECTOMY  2001    TOTAL REDUCTION MAMMOPLASTY Right 2013       MEDS:  Medcard reviewed and updated    ALLERGIES: Allergy Card reviewed and updated    SOCIAL HISTORY:   The patient is a smoker.    PE:   APPEARANCE: Well nourished, well developed, in no acute distress.    CHEST: Lungs clear to auscultation with unlabored respirations, except wheezing.  CARDIOVASCULAR: Normal S1, S2. No murmurs. No carotid bruits. No pedal edema.  ABDOMEN: Bowel sounds normal. Not distended. Soft. No tenderness or masses.   SKIN:  Erythema of of bilateral lower extremities.   PSYCHIATRIC: The patient is oriented to person, place, and time and has a pleasant affect.        ASSESSMENT/PLAN:  Diagnoses and all orders for this visit:    Essential hypertension  -     TSH; Future    Acute bronchitis, unspecified organism  -     prescribed doxycycline    ESRD (end stage renal disease)  -     continue  hemodialysis    Cellulitis, unspecified cellulitis site  -     prescribed doxycycline    Uncontrolled type 2 diabetes mellitus with stage 4 chronic kidney disease, with long-term current use of insulin  -     Comprehensive metabolic panel; Future  -     Hemoglobin A1c; Future    Fatigue, unspecified type  -     TSH; Future  -     Vitamin B12; Future    Other orders  -     doxycycline (VIBRA-TABS) 100 MG tablet; Take 1 tablet (100 mg total) by mouth 2 (two) times daily.  -     albuterol (PROVENTIL/VENTOLIN HFA) 90 mcg/actuation inhaler; Inhale 2 puffs into the lungs every 6 (six) hours as needed for Wheezing. Rescue

## 2020-03-03 ENCOUNTER — PATIENT OUTREACH (OUTPATIENT)
Dept: ADMINISTRATIVE | Facility: OTHER | Age: 65
End: 2020-03-03

## 2020-03-05 ENCOUNTER — TELEPHONE (OUTPATIENT)
Dept: INTERNAL MEDICINE | Facility: CLINIC | Age: 65
End: 2020-03-05

## 2020-03-05 ENCOUNTER — HOSPITAL ENCOUNTER (OUTPATIENT)
Dept: RADIOLOGY | Facility: HOSPITAL | Age: 65
Discharge: HOME | End: 2020-03-05
Attending: INTERNAL MEDICINE
Payer: MEDICARE

## 2020-03-05 ENCOUNTER — OFFICE VISIT (OUTPATIENT)
Dept: PODIATRY | Facility: CLINIC | Age: 65
End: 2020-03-05
Payer: MEDICARE

## 2020-03-05 ENCOUNTER — OFFICE VISIT (OUTPATIENT)
Dept: OPTOMETRY | Facility: CLINIC | Age: 65
End: 2020-03-05
Payer: MEDICARE

## 2020-03-05 VITALS
HEIGHT: 64 IN | DIASTOLIC BLOOD PRESSURE: 79 MMHG | WEIGHT: 271.19 LBS | HEART RATE: 75 BPM | BODY MASS INDEX: 46.3 KG/M2 | SYSTOLIC BLOOD PRESSURE: 125 MMHG

## 2020-03-05 VITALS — WEIGHT: 271 LBS | BODY MASS INDEX: 46.52 KG/M2

## 2020-03-05 DIAGNOSIS — H52.13 MYOPIA WITH PRESBYOPIA OF BOTH EYES: ICD-10-CM

## 2020-03-05 DIAGNOSIS — M54.14 THORACIC AND LUMBOSACRAL NEURITIS: ICD-10-CM

## 2020-03-05 DIAGNOSIS — L84 CORN OR CALLUS: ICD-10-CM

## 2020-03-05 DIAGNOSIS — M48.062 SPINAL STENOSIS OF LUMBAR REGION WITH NEUROGENIC CLAUDICATION: ICD-10-CM

## 2020-03-05 DIAGNOSIS — M54.50 BILATERAL LOW BACK PAIN WITHOUT SCIATICA, UNSPECIFIED CHRONICITY: ICD-10-CM

## 2020-03-05 DIAGNOSIS — Z96.1 PSEUDOPHAKIA: ICD-10-CM

## 2020-03-05 DIAGNOSIS — M51.37 DDD (DEGENERATIVE DISC DISEASE), LUMBOSACRAL: ICD-10-CM

## 2020-03-05 DIAGNOSIS — Z12.31 ENCOUNTER FOR SCREENING MAMMOGRAM FOR BREAST CANCER: ICD-10-CM

## 2020-03-05 DIAGNOSIS — M47.819 SPONDYLOSIS WITHOUT MYELOPATHY: ICD-10-CM

## 2020-03-05 DIAGNOSIS — M20.42 HAMMER TOE OF LEFT FOOT: ICD-10-CM

## 2020-03-05 DIAGNOSIS — M54.17 THORACIC AND LUMBOSACRAL NEURITIS: ICD-10-CM

## 2020-03-05 DIAGNOSIS — B35.1 ONYCHOMYCOSIS DUE TO DERMATOPHYTE: ICD-10-CM

## 2020-03-05 DIAGNOSIS — E11.42 DIABETIC PERIPHERAL NEUROPATHY ASSOCIATED WITH TYPE 2 DIABETES MELLITUS: Primary | ICD-10-CM

## 2020-03-05 DIAGNOSIS — E11.3291 TYPE 2 DIABETES MELLITUS WITH MILD NONPROLIFERATIVE RETINOPATHY OF RIGHT EYE WITHOUT MACULAR EDEMA, UNSPECIFIED WHETHER LONG TERM INSULIN USE: Primary | ICD-10-CM

## 2020-03-05 DIAGNOSIS — H52.4 MYOPIA WITH PRESBYOPIA OF BOTH EYES: ICD-10-CM

## 2020-03-05 DIAGNOSIS — Q06.8 TETHERED CORD: ICD-10-CM

## 2020-03-05 DIAGNOSIS — F32.A DEPRESSION, UNSPECIFIED DEPRESSION TYPE: ICD-10-CM

## 2020-03-05 DIAGNOSIS — M43.10 ACQUIRED SPONDYLOLISTHESIS: ICD-10-CM

## 2020-03-05 PROCEDURE — 77067 MAMMO DIGITAL SCREENING RIGHT WITH TOMOSYNTHESIS_CAD: ICD-10-PCS | Mod: 26,52,, | Performed by: RADIOLOGY

## 2020-03-05 PROCEDURE — 92014 PR EYE EXAM, EST PATIENT,COMPREHESV: ICD-10-PCS | Mod: S$GLB,,, | Performed by: OPTOMETRIST

## 2020-03-05 PROCEDURE — 11721 PR DEBRIDEMENT OF NAILS, 6 OR MORE: ICD-10-PCS | Mod: 59,S$GLB,, | Performed by: PODIATRIST

## 2020-03-05 PROCEDURE — 92014 COMPRE OPH EXAM EST PT 1/>: CPT | Mod: S$GLB,,, | Performed by: OPTOMETRIST

## 2020-03-05 PROCEDURE — 77063 MAMMO DIGITAL SCREENING RIGHT WITH TOMOSYNTHESIS_CAD: ICD-10-PCS | Mod: 26,52,, | Performed by: RADIOLOGY

## 2020-03-05 PROCEDURE — 11056 PARNG/CUTG B9 HYPRKR LES 2-4: CPT | Mod: S$GLB,,, | Performed by: PODIATRIST

## 2020-03-05 PROCEDURE — 99999 PR PBB SHADOW E&M-EST. PATIENT-LVL III: ICD-10-PCS | Mod: PBBFAC,,, | Performed by: PODIATRIST

## 2020-03-05 PROCEDURE — 99999 PR PBB SHADOW E&M-EST. PATIENT-LVL II: ICD-10-PCS | Mod: PBBFAC,,, | Performed by: OPTOMETRIST

## 2020-03-05 PROCEDURE — 11721 DEBRIDE NAIL 6 OR MORE: CPT | Mod: 59,S$GLB,, | Performed by: PODIATRIST

## 2020-03-05 PROCEDURE — 99214 OFFICE O/P EST MOD 30 MIN: CPT | Mod: 25,S$GLB,, | Performed by: PODIATRIST

## 2020-03-05 PROCEDURE — 99214 PR OFFICE/OUTPT VISIT, EST, LEVL IV, 30-39 MIN: ICD-10-PCS | Mod: 25,S$GLB,, | Performed by: PODIATRIST

## 2020-03-05 PROCEDURE — 77063 BREAST TOMOSYNTHESIS BI: CPT | Mod: 26,52,, | Performed by: RADIOLOGY

## 2020-03-05 PROCEDURE — 92015 PR REFRACTION: ICD-10-PCS | Mod: S$GLB,,, | Performed by: OPTOMETRIST

## 2020-03-05 PROCEDURE — 92015 DETERMINE REFRACTIVE STATE: CPT | Mod: S$GLB,,, | Performed by: OPTOMETRIST

## 2020-03-05 PROCEDURE — 99999 PR PBB SHADOW E&M-EST. PATIENT-LVL III: CPT | Mod: PBBFAC,,, | Performed by: PODIATRIST

## 2020-03-05 PROCEDURE — 92134 CPTRZ OPH DX IMG PST SGM RTA: CPT | Mod: S$GLB,,, | Performed by: OPTOMETRIST

## 2020-03-05 PROCEDURE — 92134 OCT, RETINA - OU - BOTH EYES: ICD-10-PCS | Mod: S$GLB,,, | Performed by: OPTOMETRIST

## 2020-03-05 PROCEDURE — 11056 PR TRIM BENIGN HYPERKERATOTIC SKIN LESION,2-4: ICD-10-PCS | Mod: S$GLB,,, | Performed by: PODIATRIST

## 2020-03-05 PROCEDURE — 77067 SCR MAMMO BI INCL CAD: CPT | Mod: TC

## 2020-03-05 PROCEDURE — 99999 PR PBB SHADOW E&M-EST. PATIENT-LVL II: CPT | Mod: PBBFAC,,, | Performed by: OPTOMETRIST

## 2020-03-05 PROCEDURE — 77067 SCR MAMMO BI INCL CAD: CPT | Mod: 26,52,, | Performed by: RADIOLOGY

## 2020-03-05 RX ORDER — ATORVASTATIN CALCIUM 40 MG/1
40 TABLET, FILM COATED ORAL DAILY
Qty: 90 TABLET | Refills: 3 | Status: CANCELLED | OUTPATIENT
Start: 2020-03-05

## 2020-03-05 RX ORDER — KETOCONAZOLE 20 MG/G
CREAM TOPICAL DAILY
Qty: 1 TUBE | Refills: 2 | Status: ON HOLD | OUTPATIENT
Start: 2020-03-05 | End: 2021-01-01 | Stop reason: HOSPADM

## 2020-03-05 RX ORDER — AMLODIPINE BESYLATE 10 MG/1
10 TABLET ORAL EVERY MORNING
Qty: 90 TABLET | Refills: 3 | Status: CANCELLED | OUTPATIENT
Start: 2020-03-05

## 2020-03-05 RX ORDER — CITALOPRAM 20 MG/1
20 TABLET, FILM COATED ORAL NIGHTLY
Qty: 90 TABLET | Refills: 3 | Status: CANCELLED | OUTPATIENT
Start: 2020-03-05

## 2020-03-05 RX ORDER — ERGOCALCIFEROL 1.25 MG/1
CAPSULE ORAL
Qty: 12 CAPSULE | Refills: 4 | Status: CANCELLED | OUTPATIENT
Start: 2020-03-05

## 2020-03-05 RX ORDER — GABAPENTIN 400 MG/1
400 CAPSULE ORAL NIGHTLY
Qty: 90 CAPSULE | Refills: 3 | Status: CANCELLED | OUTPATIENT
Start: 2020-03-05

## 2020-03-05 NOTE — TELEPHONE ENCOUNTER
Pt wants to start PA for Lidocaine.  States salon pas doesn't work that well.  Please send back to STAFF messages.  I won't be able to start the PA.      Pt also requesting multiple refills and     FEMARA 2.5MG TAB, 1 NIGHTLY, 90 QTY   (not on med list)    LOV:  2/18/19  Next scheduled:  5/19/20

## 2020-03-05 NOTE — TELEPHONE ENCOUNTER
----- Message from Claudine Eliel sent at 3/5/2020  2:47 PM CST -----  Contact: [phone] / self  Patient insurance do not feel it's medically necessary for her to have the lidocaine (LIDODERM) 5 %. Please get in touch with [employer] to explain why she medically need this rx.

## 2020-03-09 NOTE — PROGRESS NOTES
MILEY STEPHEN 03/2019 with Dr. Kay.  Diabetic .  Patient is on dialysis and   thinks it is affecting BS. Glasses about 1 yr old and have never seemed   right.  Distance is kind of foggy and bifocal seems too weak.  Uses   Zaditor prn for allergies.    Hemoglobin A1C       Date                     Value               Ref Range             Status                11/21/2019               9.9 (H)             4.0 - 5.6 %           Final           08/13/2019               7.7 (H)             4.0 - 5.6 %           Final        02/14/2019               7.0 (H)             4.0 - 5.6 %           Final                  Last edited by Nuha Bae, OD on 3/9/2020  8:28 AM. (History)            Assessment /Plan     For exam results, see Encounter Report.    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 Educated pt on findings and the importance of DM control. Monitor in 6 months.  2. Clear and center. Monitor yearly.  3. Prescribe Final Spec Rx. Educated patient on changes. RTC in 1 yr for annual eye exam or prn.

## 2020-03-10 ENCOUNTER — PROCEDURE VISIT (OUTPATIENT)
Dept: PODIATRY | Facility: CLINIC | Age: 65
End: 2020-03-10
Payer: MEDICARE

## 2020-03-10 VITALS
HEART RATE: 78 BPM | BODY MASS INDEX: 46.51 KG/M2 | DIASTOLIC BLOOD PRESSURE: 73 MMHG | SYSTOLIC BLOOD PRESSURE: 130 MMHG | WEIGHT: 270.94 LBS

## 2020-03-10 DIAGNOSIS — M20.42 HAMMER TOE OF LEFT FOOT: Primary | ICD-10-CM

## 2020-03-10 PROCEDURE — 28232 INCISION OF TOE TENDON: CPT | Mod: T3,S$GLB,, | Performed by: PODIATRIST

## 2020-03-10 PROCEDURE — 28232 PR INCISION FLEX TOE TENDON: ICD-10-PCS | Mod: T3,S$GLB,, | Performed by: PODIATRIST

## 2020-03-10 NOTE — PROGRESS NOTES
Subjective:      Patient ID: Kylie King is a 64 y.o. female.    Chief Complaint: Nail Care    Kylie is a 64 y.o. female who presents to the clinic for evaluation and treatment of high risk feet. Kylie has a past medical history of Anxiety, Arthritis, Breast cancer (1/2013), Carotid artery stenosis (8/13/2018), Cataract, Choledocholithiasis, Chronic obstructive pulmonary disease (6/8/2018), Chronic venous insufficiency, Colon cancer (2001), CRI (chronic renal insufficiency), Dialysis AV fistula malfunction, ESRD (end stage renal disease) on dialysis, Former smoker (6/8/2018), GERD (gastroesophageal reflux disease), History of acute pancreatitis (2009), History of cerebrovascular accident (6/8/2018), History of colon cancer, HTN (hypertension), benign, Hypercholesterolemia, Hyperlipidemia, Hypoxemia (6/8/2018), Intracerebral hemorrhage, Kidney stone, Lymphedema of both lower extremities, Obesity, Pancreatitis, Pancreatitis (2008), Physical deconditioning, Pulmonary edema, Sacroiliac joint pain, Spinal stenosis, Spinal stenosis, lumbar, Stroke (12/2012), Tobacco abuse, and Type 2 diabetes mellitus with neurological manifestations, controlled. The patient's chief complaint is long, thick toenails. This patient has documented high risk feet requiring routine maintenance secondary to diabetes mellitis and those secondary complications of diabetes, as mentioned..  In addition she has a new issue of a painful left ft hammertoe deformity that she would like to discuss conservative or surgical options to treat.  PCP: Virginia Foote MD    Date Last Seen by PCP:   Chief Complaint   Patient presents with    Nail Care         Current shoe gear:  Affected Foot: Extra depth shoes with custome accommodative insoles     Unaffected Foot: Extra depth shoes with custome accommodative insoles    Hemoglobin A1C   Date Value Ref Range Status   03/05/2020 9.9 (H) 4.0 - 5.6 % Final     Comment:     ADA Screening  Guidelines:  5.7-6.4%  Consistent with prediabetes  >or=6.5%  Consistent with diabetes  High levels of fetal hemoglobin interfere with the HbA1C  assay. Heterozygous hemoglobin variants (HbS, HgC, etc)do  not significantly interfere with this assay.   However, presence of multiple variants may affect accuracy.     11/21/2019 9.9 (H) 4.0 - 5.6 % Final     Comment:     ADA Screening Guidelines:  5.7-6.4%  Consistent with prediabetes  >or=6.5%  Consistent with diabetes  High levels of fetal hemoglobin interfere with the HbA1C  assay. Heterozygous hemoglobin variants (HbS, HgC, etc)do  not significantly interfere with this assay.   However, presence of multiple variants may affect accuracy.     08/13/2019 7.7 (H) 4.0 - 5.6 % Final     Comment:     ADA Screening Guidelines:  5.7-6.4%  Consistent with prediabetes  >or=6.5%  Consistent with diabetes  High levels of fetal hemoglobin interfere with the HbA1C  assay. Heterozygous hemoglobin variants (HbS, HgC, etc)do  not significantly interfere with this assay.   However, presence of multiple variants may affect accuracy.     10/09/2018 7.8 % Final       Review of Systems   Constitution: Negative for chills, decreased appetite, fever and night sweats.   HENT: Negative for congestion, ear discharge, nosebleeds and tinnitus.    Eyes: Negative for double vision, pain and visual disturbance.   Cardiovascular: Negative for chest pain, claudication, cyanosis and palpitations.   Respiratory: Negative for cough, hemoptysis, shortness of breath and wheezing.    Endocrine: Negative for cold intolerance and heat intolerance.   Hematologic/Lymphatic: Negative for adenopathy and bleeding problem.   Skin: Positive for color change, dry skin, nail changes and unusual hair distribution.   Musculoskeletal: Positive for arthritis, joint pain and stiffness. Negative for myalgias.   Gastrointestinal: Negative for abdominal pain, dysphagia, nausea and vomiting.   Genitourinary: Negative for  dysuria, flank pain, hematuria and pelvic pain.   Neurological: Positive for disturbances in coordination, numbness, paresthesias and sensory change.   Psychiatric/Behavioral: Negative for altered mental status, hallucinations and suicidal ideas. The patient does not have insomnia.    Allergic/Immunologic: Negative for environmental allergies and persistent infections.           Objective:      Physical Exam   Constitutional: She is oriented to person, place, and time. She appears well-developed and well-nourished.   HENT:   Head: Normocephalic and atraumatic.   Cardiovascular:   Pulses:       Dorsalis pedis pulses are 1+ on the right side, and 1+ on the left side.        Posterior tibial pulses are 1+ on the right side, and 1+ on the left side.   Pulmonary/Chest: Effort normal.   Musculoskeletal: Normal range of motion.   Anterior, lateral, and posterior muscle groups bilateral lower extremities show strength 4 over 5 symmetrically. Inspection and palpation of the joints and bones reveal no crepitus or joint effusion. No tenderness upon palpation. Mild plantar flexor contractures noted to digits 2 through 5 bilaterally.  Angle and base of gait are normal.    Left 2nd 3rd digit demonstrates increased flexion deformity compared other digits with distal hyperkeratotic changes to the toe.  Hyperkeratosis has sub hemorrhagic changes as well.   Feet:   Right Foot:   Skin Integrity: Positive for callus and dry skin.   Left Foot:   Skin Integrity: Positive for callus and dry skin.   Neurological: She is alert and oriented to person, place, and time. She displays atrophy and abnormal reflex. A sensory deficit is present.   Reflex Scores:       Patellar reflexes are 1+ on the right side and 1+ on the left side.       Achilles reflexes are 1+ on the right side and 1+ on the left side.  Sharp, dull, light touch, and vibratory sensation are diminished bilaterally. Proprioceptive sensation is intact to both lower extremities.  Doran Bonnie monofilament exam shows loss of protective sensation to plantar toes 1 through 5 bilaterally. Deep tendon reflexes to the patellar tendons is 1 over 4 bilaterally symmetrical. Deep tendon reflexes to the Achilles tendon is 0 over 4 bilaterally symmetrical. No ankle clonus or Babinski reflex noted bilaterally. Coordination is fair to both lower extremities.  Patient admits to intermittent burning and tingling in the feet.   Skin: Skin is warm and dry. Capillary refill takes 2 to 3 seconds. There is pallor.   Skin bilateral lower extremities noted to be thin, dry, and atrophic.  Toenails thickened, discolored, with subungual fungal debris and tenderness noted.  Hyperkeratotic lesions noted to both feet plantarly with tenderness.   Psychiatric: She has a normal mood and affect. Her behavior is normal.   Vitals reviewed.            Assessment:       Encounter Diagnoses   Name Primary?    Diabetic peripheral neuropathy associated with type 2 diabetes mellitus Yes    Corn or callus     Onychomycosis due to dermatophyte     Hammer toe of left foot          Plan:       Kylie was seen today for nail care.    Diagnoses and all orders for this visit:    Diabetic peripheral neuropathy associated with type 2 diabetes mellitus  -     DIABETIC SHOES FOR HOME USE    Corn or callus    Onychomycosis due to dermatophyte    Hammer toe of left foot    Other orders  -     ketoconazole (NIZORAL) 2 % cream; Apply topically once daily.    Routine Foot Care    Performed by:  Billy Robles. PRINCE  Authorized by:  Patient     Consent Done?:  Yes (Verbal)     Nail Care Type:  Debride  Location(s): All  (Left 1st Toe, Left 3rd Toe, Left 2nd Toe, Left 4th Toe, Left 5th Toe, Right 1st Toe, Right 2nd Toe, Right 3rd Toe, Right 4th Toe and Right 5th Toe)  Patient tolerance:  Patient tolerated the procedure well with no immediate complications     With patient's permission, the toenails mentioned above were aggressively reduced  and debrided using a nail nipper, removing all offending nail and debris. The patient will continue to monitor the areas daily, inspect the feet, wear protective shoe gear when ambulatory, and moisturizer to maintain skin integrity.      Callus Care Type: Debride    With patient's permission, the calluses/hyperkeratotic lesions mentioned above were aggressively reduced and debrided using a number 15 blade. The patient will continue to monitor the areas daily, inspect the feet, wear protective shoe gear when ambulatory, and moisturizer to maintain skin integrity.       I counseled the patient on her conditions, their implications and medical management.      Shoe inspection. Diabetic Foot Education. Patient reminded of the importance of good nutrition and blood sugar control to help prevent podiatric complications of diabetes. Patient instructed on proper foot hygeine. We discussed wearing proper shoe gear, daily foot inspections and Diabetic foot education in detail.    Return to clinic in and 1 week for for possible flexor tenotomy procedure hammertoe left foot.  .

## 2020-03-13 ENCOUNTER — PATIENT MESSAGE (OUTPATIENT)
Dept: INTERNAL MEDICINE | Facility: CLINIC | Age: 65
End: 2020-03-13

## 2020-03-13 RX ORDER — AMLODIPINE BESYLATE 10 MG/1
10 TABLET ORAL EVERY MORNING
Qty: 90 TABLET | Refills: 3 | Status: SHIPPED | OUTPATIENT
Start: 2020-03-13 | End: 2020-03-18 | Stop reason: SDUPTHER

## 2020-03-15 NOTE — PROCEDURES
Procedures     Attention was directed to t left 4th he digit where 4cc of 1% lidocaine plain was injected in a digital block fashion, the foot was then prepped and draped using asceptic technique, anesthesia was confirmed, a #15 blade was inserted into the central/plantar aspect of the joint contracture and the flexor tendon was released, the digit was straightened manually and reduction was deemed adequate, tincture of benzoin and steri-strips were applied to splint the digit in a corrected position and closed with 4-0 nylon suture and dry sterile dressing applied to the foot. Capillary fill time was less than 3 seconds.  Follow-up in 1 week.

## 2020-03-18 ENCOUNTER — PATIENT OUTREACH (OUTPATIENT)
Dept: ADMINISTRATIVE | Facility: OTHER | Age: 65
End: 2020-03-18

## 2020-03-18 DIAGNOSIS — M48.062 SPINAL STENOSIS OF LUMBAR REGION WITH NEUROGENIC CLAUDICATION: ICD-10-CM

## 2020-03-18 DIAGNOSIS — M47.819 SPONDYLOSIS WITHOUT MYELOPATHY: ICD-10-CM

## 2020-03-18 DIAGNOSIS — I10 ESSENTIAL HYPERTENSION: ICD-10-CM

## 2020-03-18 DIAGNOSIS — M54.50 BILATERAL LOW BACK PAIN WITHOUT SCIATICA, UNSPECIFIED CHRONICITY: ICD-10-CM

## 2020-03-18 DIAGNOSIS — M54.17 THORACIC AND LUMBOSACRAL NEURITIS: ICD-10-CM

## 2020-03-18 DIAGNOSIS — M51.37 DDD (DEGENERATIVE DISC DISEASE), LUMBOSACRAL: ICD-10-CM

## 2020-03-18 DIAGNOSIS — M54.14 THORACIC AND LUMBOSACRAL NEURITIS: ICD-10-CM

## 2020-03-18 DIAGNOSIS — F32.A DEPRESSION, UNSPECIFIED DEPRESSION TYPE: ICD-10-CM

## 2020-03-18 DIAGNOSIS — Q06.8 TETHERED CORD: ICD-10-CM

## 2020-03-18 DIAGNOSIS — M43.10 ACQUIRED SPONDYLOLISTHESIS: ICD-10-CM

## 2020-03-18 NOTE — TELEPHONE ENCOUNTER
Atoravastin please call in a 30 day supply to [facility] immediately     These need to be immediately ordered through [employer] - 90 day supply   Lexatrole              Amlodipinine

## 2020-03-19 ENCOUNTER — OFFICE VISIT (OUTPATIENT)
Dept: PODIATRY | Facility: CLINIC | Age: 65
End: 2020-03-19
Payer: MEDICARE

## 2020-03-19 VITALS
DIASTOLIC BLOOD PRESSURE: 62 MMHG | SYSTOLIC BLOOD PRESSURE: 108 MMHG | HEART RATE: 73 BPM | HEIGHT: 64 IN | BODY MASS INDEX: 46.51 KG/M2

## 2020-03-19 DIAGNOSIS — M20.42 HAMMER TOE OF LEFT FOOT: Primary | ICD-10-CM

## 2020-03-19 PROCEDURE — 99999 PR PBB SHADOW E&M-EST. PATIENT-LVL III: CPT | Mod: PBBFAC,,, | Performed by: PODIATRIST

## 2020-03-19 PROCEDURE — 99024 POSTOP FOLLOW-UP VISIT: CPT | Mod: S$GLB,,, | Performed by: PODIATRIST

## 2020-03-19 PROCEDURE — 99999 PR PBB SHADOW E&M-EST. PATIENT-LVL III: ICD-10-PCS | Mod: PBBFAC,,, | Performed by: PODIATRIST

## 2020-03-19 PROCEDURE — 99024 PR POST-OP FOLLOW-UP VISIT: ICD-10-PCS | Mod: S$GLB,,, | Performed by: PODIATRIST

## 2020-03-19 RX ORDER — LETROZOLE 2.5 MG/1
2.5 TABLET, FILM COATED ORAL NIGHTLY
Qty: 90 TABLET | Refills: 3 | Status: ON HOLD | OUTPATIENT
Start: 2020-03-19 | End: 2022-01-01 | Stop reason: HOSPADM

## 2020-03-19 RX ORDER — ERGOCALCIFEROL 1.25 MG/1
CAPSULE ORAL
Qty: 12 CAPSULE | Refills: 4 | Status: SHIPPED | OUTPATIENT
Start: 2020-03-19 | End: 2020-06-25

## 2020-03-19 RX ORDER — CITALOPRAM 20 MG/1
20 TABLET, FILM COATED ORAL NIGHTLY
Qty: 90 TABLET | Refills: 3 | Status: SHIPPED | OUTPATIENT
Start: 2020-03-19 | End: 2020-04-07 | Stop reason: SDUPTHER

## 2020-03-19 RX ORDER — HYDRALAZINE HYDROCHLORIDE 25 MG/1
25 TABLET, FILM COATED ORAL 2 TIMES DAILY
Qty: 180 TABLET | Refills: 3 | Status: SHIPPED | OUTPATIENT
Start: 2020-03-19 | End: 2020-06-25 | Stop reason: SDUPTHER

## 2020-03-19 RX ORDER — FAMOTIDINE 20 MG/1
20 TABLET, FILM COATED ORAL NIGHTLY PRN
Qty: 90 TABLET | Refills: 3 | Status: ON HOLD | OUTPATIENT
Start: 2020-03-19 | End: 2022-01-01

## 2020-03-19 RX ORDER — CARVEDILOL 25 MG/1
25 TABLET ORAL 2 TIMES DAILY
Qty: 180 TABLET | Refills: 3 | Status: ON HOLD | OUTPATIENT
Start: 2020-03-19 | End: 2021-01-01

## 2020-03-19 RX ORDER — GABAPENTIN 400 MG/1
400 CAPSULE ORAL NIGHTLY
Qty: 90 CAPSULE | Refills: 3 | Status: SHIPPED | OUTPATIENT
Start: 2020-03-19 | End: 2020-05-18 | Stop reason: SDUPTHER

## 2020-03-19 RX ORDER — AMLODIPINE BESYLATE 10 MG/1
10 TABLET ORAL EVERY MORNING
Qty: 90 TABLET | Refills: 3 | Status: ON HOLD | OUTPATIENT
Start: 2020-03-19 | End: 2021-01-01 | Stop reason: HOSPADM

## 2020-03-19 RX ORDER — ATORVASTATIN CALCIUM 40 MG/1
40 TABLET, FILM COATED ORAL DAILY
Qty: 90 TABLET | Refills: 3 | Status: SHIPPED | OUTPATIENT
Start: 2020-03-19

## 2020-03-19 NOTE — PROGRESS NOTES
"Subjective:      Patient ID: Kylie King is a 64 y.o. female.    Chief Complaint: Post-op Evaluation (suture removal)    L 4th toe suture removal 9 days post-op tendon release.    ROS No c/o pain, fever, chills, sweats.    Vitals:    03/19/20 0854   BP: 108/62   Pulse: 73   Height: 5' 4" (1.626 m)   PainSc: 0-No pain             Objective:      Physical Exam  Foot dressings dry and intact. Sutures intact. Minimal edema of toe, as expected. No erythema. No acute signs of infection. No drainage on bandages.          Assessment:       Encounter Diagnosis   Name Primary?    Hammer toe of left foot Yes   9 days S/P foot surgeryfor tenotomy of L 4th toe with satisfacatory progress and no acute signs of infection.       Plan:       Kylie was seen today for post-op evaluation.    Diagnoses and all orders for this visit:    Hammer toe of left foot    - Patient was given written and verbal instructions regarding foot condition.  I counseled the patient on her conditions, their implications and medical management.    - Sterile suture removal performed. Pt. is to return to the clinic in 2 weeks for follow-up. Continue in surgical shoe. Call if any problems.    Follow up in about 3 months (around 6/19/2020).     "

## 2020-04-07 ENCOUNTER — PATIENT MESSAGE (OUTPATIENT)
Dept: INTERNAL MEDICINE | Facility: CLINIC | Age: 65
End: 2020-04-07

## 2020-04-07 DIAGNOSIS — F32.A DEPRESSION, UNSPECIFIED DEPRESSION TYPE: ICD-10-CM

## 2020-04-07 RX ORDER — CITALOPRAM 20 MG/1
20 TABLET, FILM COATED ORAL NIGHTLY
Qty: 90 TABLET | Refills: 3 | Status: ON HOLD | OUTPATIENT
Start: 2020-04-07 | End: 2022-01-01

## 2020-05-18 ENCOUNTER — PATIENT MESSAGE (OUTPATIENT)
Dept: INTERNAL MEDICINE | Facility: CLINIC | Age: 65
End: 2020-05-18

## 2020-05-18 DIAGNOSIS — M47.819 SPONDYLOSIS WITHOUT MYELOPATHY: ICD-10-CM

## 2020-05-18 DIAGNOSIS — M51.37 DDD (DEGENERATIVE DISC DISEASE), LUMBOSACRAL: ICD-10-CM

## 2020-05-18 DIAGNOSIS — M54.17 THORACIC AND LUMBOSACRAL NEURITIS: ICD-10-CM

## 2020-05-18 DIAGNOSIS — M43.10 ACQUIRED SPONDYLOLISTHESIS: ICD-10-CM

## 2020-05-18 DIAGNOSIS — M48.062 SPINAL STENOSIS OF LUMBAR REGION WITH NEUROGENIC CLAUDICATION: ICD-10-CM

## 2020-05-18 DIAGNOSIS — M54.50 BILATERAL LOW BACK PAIN WITHOUT SCIATICA, UNSPECIFIED CHRONICITY: ICD-10-CM

## 2020-05-18 DIAGNOSIS — Q06.8 TETHERED CORD: ICD-10-CM

## 2020-05-18 DIAGNOSIS — M54.14 THORACIC AND LUMBOSACRAL NEURITIS: ICD-10-CM

## 2020-05-18 RX ORDER — GABAPENTIN 400 MG/1
400 CAPSULE ORAL NIGHTLY
Qty: 90 CAPSULE | Refills: 3 | Status: ON HOLD | OUTPATIENT
Start: 2020-05-18 | End: 2021-01-01 | Stop reason: HOSPADM

## 2020-06-05 ENCOUNTER — PATIENT OUTREACH (OUTPATIENT)
Dept: ADMINISTRATIVE | Facility: OTHER | Age: 65
End: 2020-06-05

## 2020-06-25 ENCOUNTER — OFFICE VISIT (OUTPATIENT)
Dept: INTERNAL MEDICINE | Facility: CLINIC | Age: 65
End: 2020-06-25
Payer: MEDICARE

## 2020-06-25 VITALS
OXYGEN SATURATION: 96 % | BODY MASS INDEX: 47.04 KG/M2 | WEIGHT: 275.56 LBS | DIASTOLIC BLOOD PRESSURE: 80 MMHG | HEART RATE: 86 BPM | SYSTOLIC BLOOD PRESSURE: 130 MMHG | HEIGHT: 64 IN | TEMPERATURE: 99 F | RESPIRATION RATE: 16 BRPM

## 2020-06-25 DIAGNOSIS — I10 ESSENTIAL HYPERTENSION: Primary | ICD-10-CM

## 2020-06-25 DIAGNOSIS — N18.6 ESRD (END STAGE RENAL DISEASE): ICD-10-CM

## 2020-06-25 PROCEDURE — 99213 PR OFFICE/OUTPT VISIT, EST, LEVL III, 20-29 MIN: ICD-10-PCS | Mod: S$GLB,,, | Performed by: INTERNAL MEDICINE

## 2020-06-25 PROCEDURE — 99999 PR PBB SHADOW E&M-EST. PATIENT-LVL V: CPT | Mod: PBBFAC,,, | Performed by: INTERNAL MEDICINE

## 2020-06-25 PROCEDURE — 99213 OFFICE O/P EST LOW 20 MIN: CPT | Mod: S$GLB,,, | Performed by: INTERNAL MEDICINE

## 2020-06-25 PROCEDURE — 99999 PR PBB SHADOW E&M-EST. PATIENT-LVL V: ICD-10-PCS | Mod: PBBFAC,,, | Performed by: INTERNAL MEDICINE

## 2020-06-25 RX ORDER — INSULIN DETEMIR 100 [IU]/ML
45 INJECTION, SOLUTION SUBCUTANEOUS 2 TIMES DAILY
Qty: 81 ML | Refills: 3 | Status: SHIPPED | OUTPATIENT
Start: 2020-06-25 | End: 2020-09-23

## 2020-06-25 RX ORDER — DICLOFENAC SODIUM 10 MG/G
2 GEL TOPICAL 4 TIMES DAILY
Qty: 100 G | Refills: 3 | Status: ON HOLD | OUTPATIENT
Start: 2020-06-25 | End: 2021-01-01 | Stop reason: HOSPADM

## 2020-06-25 RX ORDER — ALBUTEROL SULFATE 90 UG/1
2 AEROSOL, METERED RESPIRATORY (INHALATION) EVERY 6 HOURS PRN
Qty: 18 G | Refills: 3 | Status: SHIPPED | OUTPATIENT
Start: 2020-06-25 | End: 2022-01-01 | Stop reason: SDUPTHER

## 2020-06-25 RX ORDER — HYDRALAZINE HYDROCHLORIDE 25 MG/1
25 TABLET, FILM COATED ORAL 2 TIMES DAILY
Qty: 180 TABLET | Refills: 3 | Status: ON HOLD | OUTPATIENT
Start: 2020-06-25 | End: 2021-01-01 | Stop reason: HOSPADM

## 2020-06-25 NOTE — PROGRESS NOTES
CC: followup of hypertension  HPI:  The patient is a 64 y.o. year old female who presents to the office for followup of hypertension.  The patient denies any chest pain,  headache, blurred vision,  nausea or vomiting, but complains of shortness of breath and fatigue.  She complains of pain involving her left side of neck, shoulder and upper back.  She has taken tylenol with some relief.  Pain is intermittent with no known triggers.  Pain is 9/10, spasm sensation that lasts about an hour. She reports her blood sugars have been elevated over the last week.    PAST MEDICAL HISTORY:  Past Medical History:   Diagnosis Date    Anxiety     Arthritis     DDD, Lumbar    Breast cancer 1/2013    left breast- invasive mammary carcinoma, ER/NC positive, Her2 negative    Carotid artery stenosis 8/13/2018 6/22/2018: Carotid Duplex: SHANELL: Moderate plaquing - 2.0 m/s - <50%. LICA: Moderate plaquing - 1.6 m/s - <50%.    Cataract     Choledocholithiasis     s/p ERCP and stent placement    Chronic obstructive pulmonary disease 6/8/2018    Chronic venous insufficiency     Colon cancer 2001    CRI (chronic renal insufficiency)     one kidney    Dialysis AV fistula malfunction     ESRD (end stage renal disease) on dialysis     Former smoker 6/8/2018    GERD (gastroesophageal reflux disease)     History of acute pancreatitis 2009 2/09 s/p sphincterotomy    History of cerebrovascular accident 6/8/2018    History of colon cancer     s/p sigmoid colectomy    HTN (hypertension), benign     Hypercholesterolemia     Hyperlipidemia     Hypoxemia 6/8/2018    Intracerebral hemorrhage     Kidney stone     left    Lymphedema of both lower extremities     Obesity     Pancreatitis     Pancreatitis 2008    Physical deconditioning     Pulmonary edema     Sacroiliac joint pain     Spinal stenosis     Spinal stenosis, lumbar     Stroke 12/2012    Tobacco abuse     Type 2 diabetes mellitus with neurological  manifestations, controlled     Neuropathy       SURGICAL HISTORY:  Past Surgical History:   Procedure Laterality Date    AV FISTULA PLACEMENT Right 5/28/2018    Procedure: CREATION -FISTULA-AV;  Surgeon: RICK Pollard III, MD;  Location: [facility] OR [address] [place] [address];  Service: Peripheral Vascular;  Laterality: Right;    BREAST BIOPSY  1/2012    left breast invasive mammary carcinoma (lateral bx) and intraductal papilloma (medial bx)    BREAST BIOPSY  1999    rt breast FA    CATARACT EXTRACTION W/  INTRAOCULAR LENS IMPLANT Right 1/24/2019        CATARACT EXTRACTION W/  INTRAOCULAR LENS IMPLANT Left 1/31/2019    Procedure: EXTRACTION, CATARACT, WITH IOL INSERTION;  Surgeon: Immanuel Moncada MD;  Location: [facility];  Service: Ophthalmology;  Laterality: Left;    CHOLECYSTECTOMY  2001    COLON SURGERY      HERNIA REPAIR      left nephrectomy      atrophic kidney and hydronephrosis    left oopherectomy  2001    MASTECTOMY  2013    left    NEPHRECTOMY  2011    lap    REVISION OF ARTERIOVENOUS FISTULA Right 8/15/2018    Procedure: REVISION, AV FISTULA;  Surgeon: RICK Pollard III, MD;  Location: [facility] OR [address] [place] [address];  Service: Peripheral Vascular;  Laterality: Right;    SIGMOIDECTOMY  2001    TOTAL REDUCTION MAMMOPLASTY Right 2013       MEDS:  Medcard reviewed and updated    ALLERGIES: Allergy Card reviewed and updated    SOCIAL HISTORY:   The patient is a nonsmoker.    PE:   APPEARANCE: Well nourished, well developed, in no acute distress.    CHEST: Lungs clear to auscultation with unlabored respirations.  CARDIOVASCULAR: Normal S1, S2. No murmurs. No carotid bruits. No pedal edema.  ABDOMEN: Bowel sounds normal. Not distended. Soft. No tenderness or masses.   MUSCULOSKELETAL:  In wheelchair  PSYCHIATRIC: The patient is oriented to person, place, and time and has a pleasant affect.        ASSESSMENT/PLAN:  Kylie was seen today for follow-up.    Diagnoses and all orders for this visit:    Essential hypertension  -      blood pressure is controlled    ESRD (end stage renal disease)  -     continue hemodialysis    Uncontrolled type 2 diabetes mellitus with stage 4 chronic kidney disease, with long-term current use of insulin  -     Refill insulin    Other orders  -     insulin detemir U-100 (LEVEMIR FLEXTOUCH U-100 INSULN) 100 unit/mL (3 mL) InPn pen; Inject 45 Units into the skin 2 (two) times daily.  -     hydrALAZINE (APRESOLINE) 25 MG tablet; Take 1 tablet (25 mg total) by mouth 2 (two) times daily.  -     diclofenac sodium (VOLTAREN) 1 % Gel; Apply 2 grams topically 4 (four) times daily.  -     albuterol (PROVENTIL/VENTOLIN HFA) 90 mcg/actuation inhaler; Inhale 2 puffs into the lungs every 6 (six) hours as needed for Wheezing. Rescue

## 2020-07-10 DIAGNOSIS — M48.062 SPINAL STENOSIS OF LUMBAR REGION WITH NEUROGENIC CLAUDICATION: ICD-10-CM

## 2020-07-10 RX ORDER — ACETAMINOPHEN AND CODEINE PHOSPHATE 300; 30 MG/1; MG/1
TABLET ORAL
Qty: 90 TABLET | Refills: 0 | Status: SHIPPED | OUTPATIENT
Start: 2020-07-10 | End: 2020-09-11 | Stop reason: SDUPTHER

## 2020-07-31 ENCOUNTER — PATIENT OUTREACH (OUTPATIENT)
Dept: ADMINISTRATIVE | Facility: OTHER | Age: 65
End: 2020-07-31

## 2020-08-04 ENCOUNTER — OFFICE VISIT (OUTPATIENT)
Dept: PODIATRY | Facility: CLINIC | Age: 65
End: 2020-08-04
Payer: MEDICARE

## 2020-08-04 DIAGNOSIS — B35.1 ONYCHOMYCOSIS DUE TO DERMATOPHYTE: ICD-10-CM

## 2020-08-04 DIAGNOSIS — E11.42 DIABETIC PERIPHERAL NEUROPATHY ASSOCIATED WITH TYPE 2 DIABETES MELLITUS: Primary | ICD-10-CM

## 2020-08-04 DIAGNOSIS — L84 CORN OR CALLUS: ICD-10-CM

## 2020-08-04 PROCEDURE — 99999 PR PBB SHADOW E&M-EST. PATIENT-LVL III: CPT | Mod: PBBFAC,,, | Performed by: PODIATRIST

## 2020-08-04 PROCEDURE — 11730 PR REMOVAL OF NAIL PLATE: ICD-10-PCS | Mod: 51,TA,S$GLB, | Performed by: PODIATRIST

## 2020-08-04 PROCEDURE — 11721 DEBRIDE NAIL 6 OR MORE: CPT | Mod: 59,Q9,S$GLB, | Performed by: PODIATRIST

## 2020-08-04 PROCEDURE — 11056 PR TRIM BENIGN HYPERKERATOTIC SKIN LESION,2-4: ICD-10-PCS | Mod: Q9,S$GLB,, | Performed by: PODIATRIST

## 2020-08-04 PROCEDURE — 11730 AVULSION NAIL PLATE SIMPLE 1: CPT | Mod: 51,TA,S$GLB, | Performed by: PODIATRIST

## 2020-08-04 PROCEDURE — 99213 PR OFFICE/OUTPT VISIT, EST, LEVL III, 20-29 MIN: ICD-10-PCS | Mod: 25,S$GLB,, | Performed by: PODIATRIST

## 2020-08-04 PROCEDURE — 99213 OFFICE O/P EST LOW 20 MIN: CPT | Mod: 25,S$GLB,, | Performed by: PODIATRIST

## 2020-08-04 PROCEDURE — 11721 PR DEBRIDEMENT OF NAILS, 6 OR MORE: ICD-10-PCS | Mod: 59,Q9,S$GLB, | Performed by: PODIATRIST

## 2020-08-04 PROCEDURE — 99999 PR PBB SHADOW E&M-EST. PATIENT-LVL III: ICD-10-PCS | Mod: PBBFAC,,, | Performed by: PODIATRIST

## 2020-08-04 PROCEDURE — 11056 PARNG/CUTG B9 HYPRKR LES 2-4: CPT | Mod: Q9,S$GLB,, | Performed by: PODIATRIST

## 2020-08-04 RX ORDER — AMMONIUM LACTATE 12 G/100G
CREAM TOPICAL
Qty: 140 G | Refills: 11 | Status: ON HOLD | OUTPATIENT
Start: 2020-08-04 | End: 2021-01-01 | Stop reason: HOSPADM

## 2020-08-04 RX ORDER — CLINDAMYCIN HYDROCHLORIDE 300 MG/1
300 CAPSULE ORAL 2 TIMES DAILY
Qty: 14 CAPSULE | Refills: 0 | Status: SHIPPED | OUTPATIENT
Start: 2020-08-04 | End: 2020-08-11

## 2020-08-04 NOTE — PROGRESS NOTES
Subjective:      Patient ID: Kylie King is a 64 y.o. female.    Chief Complaint: Diabetic Foot Exam (PCP ANNE 6/25)    Kyile is a 64 y.o. female who presents to the clinic for evaluation and treatment of high risk feet. Kylie has a past medical history of Anxiety, Arthritis, Breast cancer (1/2013), Carotid artery stenosis (8/13/2018), Cataract, Choledocholithiasis, Chronic obstructive pulmonary disease (6/8/2018), Chronic venous insufficiency, Colon cancer (2001), CRI (chronic renal insufficiency), Dialysis AV fistula malfunction, ESRD (end stage renal disease) on dialysis, Former smoker (6/8/2018), GERD (gastroesophageal reflux disease), History of acute pancreatitis (2009), History of cerebrovascular accident (6/8/2018), History of colon cancer, HTN (hypertension), benign, Hypercholesterolemia, Hyperlipidemia, Hypoxemia (6/8/2018), Intracerebral hemorrhage, Kidney stone, Lymphedema of both lower extremities, Obesity, Pancreatitis, Pancreatitis (2008), Physical deconditioning, Pulmonary edema, Sacroiliac joint pain, Spinal stenosis, Spinal stenosis, lumbar, Stroke (12/2012), Tobacco abuse, and Type 2 diabetes mellitus with neurological manifestations, controlled. The patient's chief complaint is long, thick toenails. This patient has documented high risk feet requiring routine maintenance secondary to diabetes mellitis and those secondary complications of diabetes, as mentioned..  She is a new issue of a painful loose hallux nail secondary to trying to treat her nail at home.    PCP: Virginia Foote MD    Date Last Seen by PCP:   Chief Complaint   Patient presents with    Diabetic Foot Exam     GUERLINE FOOTE 6/25         Current shoe gear:  Affected Foot: Extra depth shoes with custome accommodative insoles     Unaffected Foot: Extra depth shoes with custome accommodative insoles    Hemoglobin A1C   Date Value Ref Range Status   03/05/2020 9.9 (H) 4.0 - 5.6 % Final     Comment:     ADA Screening  Guidelines:  5.7-6.4%  Consistent with prediabetes  >or=6.5%  Consistent with diabetes  High levels of fetal hemoglobin interfere with the HbA1C  assay. Heterozygous hemoglobin variants (HbS, HgC, etc)do  not significantly interfere with this assay.   However, presence of multiple variants may affect accuracy.     11/21/2019 9.9 (H) 4.0 - 5.6 % Final     Comment:     ADA Screening Guidelines:  5.7-6.4%  Consistent with prediabetes  >or=6.5%  Consistent with diabetes  High levels of fetal hemoglobin interfere with the HbA1C  assay. Heterozygous hemoglobin variants (HbS, HgC, etc)do  not significantly interfere with this assay.   However, presence of multiple variants may affect accuracy.     08/13/2019 7.7 (H) 4.0 - 5.6 % Final     Comment:     ADA Screening Guidelines:  5.7-6.4%  Consistent with prediabetes  >or=6.5%  Consistent with diabetes  High levels of fetal hemoglobin interfere with the HbA1C  assay. Heterozygous hemoglobin variants (HbS, HgC, etc)do  not significantly interfere with this assay.   However, presence of multiple variants may affect accuracy.     10/09/2018 7.8 % Final       Review of Systems   Constitution: Negative for chills, decreased appetite, fever and night sweats.   HENT: Negative for congestion, ear discharge, nosebleeds and tinnitus.    Eyes: Negative for double vision, pain and visual disturbance.   Cardiovascular: Negative for chest pain, claudication, cyanosis and palpitations.   Respiratory: Negative for cough, hemoptysis, shortness of breath and wheezing.    Endocrine: Negative for cold intolerance and heat intolerance.   Hematologic/Lymphatic: Negative for adenopathy and bleeding problem.   Skin: Positive for color change, dry skin, nail changes and unusual hair distribution.   Musculoskeletal: Positive for arthritis, joint pain and stiffness. Negative for myalgias.   Gastrointestinal: Negative for abdominal pain, dysphagia, nausea and vomiting.   Genitourinary: Negative for  dysuria, flank pain, hematuria and pelvic pain.   Neurological: Positive for disturbances in coordination, numbness, paresthesias and sensory change.   Psychiatric/Behavioral: Negative for altered mental status, hallucinations and suicidal ideas. The patient does not have insomnia.    Allergic/Immunologic: Negative for environmental allergies and persistent infections.           Objective:      Physical Exam   Constitutional: She is oriented to person, place, and time. She appears well-developed and well-nourished.   HENT:   Head: Normocephalic and atraumatic.   Cardiovascular:   Pulses:       Dorsalis pedis pulses are 1+ on the right side, and 1+ on the left side.        Posterior tibial pulses are 1+ on the right side, and 1+ on the left side.   Pulmonary/Chest: Effort normal.   Musculoskeletal: Normal range of motion.   Anterior, lateral, and posterior muscle groups bilateral lower extremities show strength 4 over 5 symmetrically. Inspection and palpation of the joints and bones reveal no crepitus or joint effusion. No tenderness upon palpation. Mild plantar flexor contractures noted to digits 2 through 5 bilaterally.  Angle and base of gait are normal.    Left 2nd 3rd digit demonstrates increased flexion deformity compared other digits with distal hyperkeratotic changes to the toe.  Hyperkeratosis has sub hemorrhagic changes as well.   Feet:   Right Foot:   Skin Integrity: Positive for callus and dry skin.   Left Foot:   Skin Integrity: Positive for callus and dry skin.   Neurological: She is alert and oriented to person, place, and time. She displays atrophy and abnormal reflex. A sensory deficit is present.   Reflex Scores:       Patellar reflexes are 1+ on the right side and 1+ on the left side.       Achilles reflexes are 1+ on the right side and 1+ on the left side.  Sharp, dull, light touch, and vibratory sensation are diminished bilaterally. Proprioceptive sensation is intact to both lower extremities.  Longs Bonnie monofilament exam shows loss of protective sensation to plantar toes 1 through 5 bilaterally. Deep tendon reflexes to the patellar tendons is 1 over 4 bilaterally symmetrical. Deep tendon reflexes to the Achilles tendon is 0 over 4 bilaterally symmetrical. No ankle clonus or Babinski reflex noted bilaterally. Coordination is fair to both lower extremities.  Patient admits to intermittent burning and tingling in the feet.   Skin: Skin is warm and dry. Capillary refill takes 2 to 3 seconds. There is pallor.   Skin bilateral lower extremities noted to be thin, dry, and atrophic.  Toenails thickened, discolored, with subungual fungal debris and tenderness noted.  Left hallux nail is loose distally with subungual hematoma and distal blistering and erythema dorsally.  Hyperkeratotic lesions noted to both feet plantarly with tenderness.   Psychiatric: She has a normal mood and affect. Her behavior is normal.   Vitals reviewed.            Assessment:       Encounter Diagnoses   Name Primary?    Diabetic peripheral neuropathy associated with type 2 diabetes mellitus Yes    Onychomycosis due to dermatophyte     Corn or callus          Plan:       Kylie was seen today for diabetic foot exam.    Diagnoses and all orders for this visit:    Diabetic peripheral neuropathy associated with type 2 diabetes mellitus    Onychomycosis due to dermatophyte    Corn or callus    Other orders  -     ammonium lactate 12 % Crea; Apply twice daily to affected parts both feet as needed.  -     clindamycin (CLEOCIN) 300 MG capsule; Take 1 capsule (300 mg total) by mouth 2 (two) times a day. for 7 days    Routine Foot Care    Performed by:  Billy Robles. DPM  Authorized by:  Patient     Consent Done?:  Yes (Verbal)     Nail Care Type:  Debride  Location(s): All  (Left 1st Toe, Left 3rd Toe, Left 2nd Toe, Left 4th Toe, Left 5th Toe, Right 1st Toe, Right 2nd Toe, Right 3rd Toe, Right 4th Toe and Right 5th Toe)  Patient  tolerance:  Patient tolerated the procedure well with no immediate complications     With patient's permission, the toenails mentioned above were aggressively reduced and debrided using a nail nipper, removing all offending nail and debris. The patient will continue to monitor the areas daily, inspect the feet, wear protective shoe gear when ambulatory, and moisturizer to maintain skin integrity.      Callus Care Type: Debride    With patient's permission, the calluses/hyperkeratotic lesions mentioned above were aggressively reduced and debrided using a number 15 blade. The patient will continue to monitor the areas daily, inspect the feet, wear protective shoe gear when ambulatory, and moisturizer to maintain skin integrity.       I counseled the patient on her conditions, their implications and medical management.    Temporary Nail Removal     Performed by:  Billy Robles DPM     Consent Done?:  Yes (Written)     Location:   left hallux nail    Anesthesia:  Digital block  Local anesthetic: 0.5% Marcaine without epinephrine  Anesthetic total (ml):  4  Preparation:  Skin prepped with alcohol and skin prepped with Betadine     Amount removed:   total avulsion without matrixectomy  Wedge excision of skin of nail fold: No    Nail bed sutured?: No    Nail matrix removed:  No  Removed nail replaced and anchored: No    Dressing applied:  4x4, antibiotic ointment and dressing applied  Patient tolerance:  Patient tolerated the procedure well with no immediate complications    Digital nerve block applied to the above-mentioned toe, left hallux. Toe was prepped and draped in a sterile fashion and penrose drain applied. Anesthesia was confirmed and the offending portion of nail plate was freed from the nail bed and eponychium, and was then excised. The surgical site was then lavaged with copious amounts of 70% isopropyl alcohol. Penrose drain was removed and betadine ointment and dry sterile dressing applied. Post-op care  instructions were discussed and dispensed to patient.    Postoperative nail procedure instructions were discussed in detail including soaking in warm water and Epsom salt or antibacterial soap, topical antibiotic ointment, dry sterile dressing, and a follow-up in 2 weeks.        Shoe inspection. Diabetic Foot Education. Patient reminded of the importance of good nutrition and blood sugar control to help prevent podiatric complications of diabetes. Patient instructed on proper foot hygeine. We discussed wearing proper shoe gear, daily foot inspections and Diabetic foot education in detail.

## 2020-08-27 ENCOUNTER — TELEPHONE (OUTPATIENT)
Dept: PODIATRY | Facility: CLINIC | Age: 65
End: 2020-08-27

## 2020-08-27 NOTE — TELEPHONE ENCOUNTER
I called patient after reviewing the photos she sent in. I asked her to come in on tomorrow she declined. And stated she have a appointment on 9/3/2020

## 2020-09-03 ENCOUNTER — HOSPITAL ENCOUNTER (INPATIENT)
Facility: HOSPITAL | Age: 65
LOS: 4 days | Discharge: HOME | DRG: 299 | End: 2020-09-07
Attending: EMERGENCY MEDICINE | Admitting: HOSPITALIST
Payer: MEDICARE

## 2020-09-03 ENCOUNTER — OFFICE VISIT (OUTPATIENT)
Dept: PODIATRY | Facility: CLINIC | Age: 65
End: 2020-09-03
Payer: MEDICARE

## 2020-09-03 ENCOUNTER — PATIENT OUTREACH (OUTPATIENT)
Dept: ADMINISTRATIVE | Facility: OTHER | Age: 65
End: 2020-09-03

## 2020-09-03 VITALS — BODY MASS INDEX: 47.3 KG/M2 | WEIGHT: 275.56 LBS

## 2020-09-03 DIAGNOSIS — L03.115 CELLULITIS OF RIGHT FOOT: ICD-10-CM

## 2020-09-03 DIAGNOSIS — I96 TOE GANGRENE: Primary | ICD-10-CM

## 2020-09-03 DIAGNOSIS — I73.9 PVD (PERIPHERAL VASCULAR DISEASE): ICD-10-CM

## 2020-09-03 DIAGNOSIS — R07.9 CHEST PAIN: ICD-10-CM

## 2020-09-03 DIAGNOSIS — I96 DRY GANGRENE: ICD-10-CM

## 2020-09-03 DIAGNOSIS — M79.673 FOOT PAIN: ICD-10-CM

## 2020-09-03 DIAGNOSIS — N18.6 ESRD (END STAGE RENAL DISEASE) ON DIALYSIS: ICD-10-CM

## 2020-09-03 DIAGNOSIS — I73.9 PERIPHERAL VASCULAR DISEASE: ICD-10-CM

## 2020-09-03 DIAGNOSIS — E11.42 DIABETIC PERIPHERAL NEUROPATHY ASSOCIATED WITH TYPE 2 DIABETES MELLITUS: Primary | ICD-10-CM

## 2020-09-03 DIAGNOSIS — M79.604 LEG PAIN, RIGHT: ICD-10-CM

## 2020-09-03 DIAGNOSIS — I96 GANGRENE OF RIGHT FOOT: ICD-10-CM

## 2020-09-03 DIAGNOSIS — Z99.2 ESRD (END STAGE RENAL DISEASE) ON DIALYSIS: ICD-10-CM

## 2020-09-03 PROBLEM — R53.81 DEBILITY: Status: ACTIVE | Noted: 2017-04-13

## 2020-09-03 LAB
ALBUMIN SERPL BCP-MCNC: 3.5 G/DL (ref 3.5–5.2)
ALP SERPL-CCNC: 66 U/L (ref 55–135)
ALT SERPL W/O P-5'-P-CCNC: 18 U/L (ref 10–44)
ANION GAP SERPL CALC-SCNC: 14 MMOL/L (ref 8–16)
APTT BLDCRRT: 27.1 SEC (ref 21–32)
AST SERPL-CCNC: 10 U/L (ref 10–40)
BASOPHILS # BLD AUTO: 0.04 K/UL (ref 0–0.2)
BASOPHILS NFR BLD: 0.5 % (ref 0–1.9)
BILIRUB SERPL-MCNC: 0.4 MG/DL (ref 0.1–1)
BUN SERPL-MCNC: 38 MG/DL (ref 8–23)
CALCIUM SERPL-MCNC: 9.4 MG/DL (ref 8.7–10.5)
CHLORIDE SERPL-SCNC: 92 MMOL/L (ref 95–110)
CO2 SERPL-SCNC: 24 MMOL/L (ref 23–29)
CREAT SERPL-MCNC: 7.1 MG/DL (ref 0.5–1.4)
CRP SERPL-MCNC: 49.2 MG/L (ref 0–8.2)
DIFFERENTIAL METHOD: ABNORMAL
EOSINOPHIL # BLD AUTO: 0.1 K/UL (ref 0–0.5)
EOSINOPHIL NFR BLD: 0.9 % (ref 0–8)
ERYTHROCYTE [DISTWIDTH] IN BLOOD BY AUTOMATED COUNT: 18.5 % (ref 11.5–14.5)
ERYTHROCYTE [SEDIMENTATION RATE] IN BLOOD BY WESTERGREN METHOD: 82 MM/HR (ref 0–36)
EST. GFR  (AFRICAN AMERICAN): 6.4 ML/MIN/1.73 M^2
EST. GFR  (NON AFRICAN AMERICAN): 5.5 ML/MIN/1.73 M^2
ESTIMATED AVG GLUCOSE: 206 MG/DL (ref 68–131)
GLUCOSE SERPL-MCNC: 331 MG/DL (ref 70–110)
HBA1C MFR BLD HPLC: 8.8 % (ref 4–5.6)
HCT VFR BLD AUTO: 33.6 % (ref 37–48.5)
HGB BLD-MCNC: 10.5 G/DL (ref 12–16)
IMM GRANULOCYTES # BLD AUTO: 0.1 K/UL (ref 0–0.04)
IMM GRANULOCYTES NFR BLD AUTO: 1.1 % (ref 0–0.5)
INR PPP: 1 (ref 0.8–1.2)
LYMPHOCYTES # BLD AUTO: 1.5 K/UL (ref 1–4.8)
LYMPHOCYTES NFR BLD: 16.6 % (ref 18–48)
MAGNESIUM SERPL-MCNC: 1.9 MG/DL (ref 1.6–2.6)
MCH RBC QN AUTO: 29.6 PG (ref 27–31)
MCHC RBC AUTO-ENTMCNC: 31.3 G/DL (ref 32–36)
MCV RBC AUTO: 95 FL (ref 82–98)
MONOCYTES # BLD AUTO: 0.8 K/UL (ref 0.3–1)
MONOCYTES NFR BLD: 8.8 % (ref 4–15)
NEUTROPHILS # BLD AUTO: 6.4 K/UL (ref 1.8–7.7)
NEUTROPHILS NFR BLD: 72.1 % (ref 38–73)
NRBC BLD-RTO: 0 /100 WBC
PHOSPHATE SERPL-MCNC: 5.4 MG/DL (ref 2.7–4.5)
PLATELET # BLD AUTO: 304 K/UL (ref 150–350)
PMV BLD AUTO: 11 FL (ref 9.2–12.9)
POCT GLUCOSE: 241 MG/DL (ref 70–110)
POTASSIUM SERPL-SCNC: 4.9 MMOL/L (ref 3.5–5.1)
PROT SERPL-MCNC: 7.9 G/DL (ref 6–8.4)
PROTHROMBIN TIME: 10.7 SEC (ref 9–12.5)
RBC # BLD AUTO: 3.55 M/UL (ref 4–5.4)
SARS-COV-2 RDRP RESP QL NAA+PROBE: NEGATIVE
SODIUM SERPL-SCNC: 130 MMOL/L (ref 136–145)
WBC # BLD AUTO: 8.86 K/UL (ref 3.9–12.7)

## 2020-09-03 PROCEDURE — C9399 UNCLASSIFIED DRUGS OR BIOLOG: HCPCS | Performed by: STUDENT IN AN ORGANIZED HEALTH CARE EDUCATION/TRAINING PROGRAM

## 2020-09-03 PROCEDURE — 25000003 PHARM REV CODE 250: Performed by: EMERGENCY MEDICINE

## 2020-09-03 PROCEDURE — 85025 COMPLETE CBC W/AUTO DIFF WBC: CPT

## 2020-09-03 PROCEDURE — 83036 HEMOGLOBIN GLYCOSYLATED A1C: CPT

## 2020-09-03 PROCEDURE — 99285 PR EMERGENCY DEPT VISIT,LEVEL V: ICD-10-PCS | Mod: ,,, | Performed by: EMERGENCY MEDICINE

## 2020-09-03 PROCEDURE — 80053 COMPREHEN METABOLIC PANEL: CPT

## 2020-09-03 PROCEDURE — 99999 PR PBB SHADOW E&M-EST. PATIENT-LVL IV: ICD-10-PCS | Mod: PBBFAC,,, | Performed by: PODIATRIST

## 2020-09-03 PROCEDURE — U0002 COVID-19 LAB TEST NON-CDC: HCPCS

## 2020-09-03 PROCEDURE — 85730 THROMBOPLASTIN TIME PARTIAL: CPT

## 2020-09-03 PROCEDURE — 99214 OFFICE O/P EST MOD 30 MIN: CPT | Mod: S$GLB,,, | Performed by: PODIATRIST

## 2020-09-03 PROCEDURE — 85652 RBC SED RATE AUTOMATED: CPT

## 2020-09-03 PROCEDURE — 85610 PROTHROMBIN TIME: CPT

## 2020-09-03 PROCEDURE — 20600001 HC STEP DOWN PRIVATE ROOM

## 2020-09-03 PROCEDURE — 83735 ASSAY OF MAGNESIUM: CPT

## 2020-09-03 PROCEDURE — 63600175 PHARM REV CODE 636 W HCPCS: Performed by: EMERGENCY MEDICINE

## 2020-09-03 PROCEDURE — 99285 EMERGENCY DEPT VISIT HI MDM: CPT | Mod: ,,, | Performed by: EMERGENCY MEDICINE

## 2020-09-03 PROCEDURE — 86140 C-REACTIVE PROTEIN: CPT

## 2020-09-03 PROCEDURE — 99214 PR OFFICE/OUTPT VISIT, EST, LEVL IV, 30-39 MIN: ICD-10-PCS | Mod: S$GLB,,, | Performed by: PODIATRIST

## 2020-09-03 PROCEDURE — 99999 PR PBB SHADOW E&M-EST. PATIENT-LVL IV: CPT | Mod: PBBFAC,,, | Performed by: PODIATRIST

## 2020-09-03 PROCEDURE — 99285 EMERGENCY DEPT VISIT HI MDM: CPT | Mod: 25

## 2020-09-03 PROCEDURE — 87040 BLOOD CULTURE FOR BACTERIA: CPT

## 2020-09-03 PROCEDURE — 84100 ASSAY OF PHOSPHORUS: CPT

## 2020-09-03 PROCEDURE — 63600175 PHARM REV CODE 636 W HCPCS: Performed by: STUDENT IN AN ORGANIZED HEALTH CARE EDUCATION/TRAINING PROGRAM

## 2020-09-03 PROCEDURE — 51798 US URINE CAPACITY MEASURE: CPT

## 2020-09-03 PROCEDURE — 96365 THER/PROPH/DIAG IV INF INIT: CPT

## 2020-09-03 PROCEDURE — 25000003 PHARM REV CODE 250: Performed by: STUDENT IN AN ORGANIZED HEALTH CARE EDUCATION/TRAINING PROGRAM

## 2020-09-03 RX ORDER — ACETAMINOPHEN 325 MG/1
650 TABLET ORAL EVERY 4 HOURS PRN
Status: DISCONTINUED | OUTPATIENT
Start: 2020-09-03 | End: 2020-09-07 | Stop reason: HOSPADM

## 2020-09-03 RX ORDER — AMLODIPINE BESYLATE 10 MG/1
10 TABLET ORAL EVERY MORNING
Status: DISCONTINUED | OUTPATIENT
Start: 2020-09-03 | End: 2020-09-07 | Stop reason: HOSPADM

## 2020-09-03 RX ORDER — HYDRALAZINE HYDROCHLORIDE 25 MG/1
25 TABLET, FILM COATED ORAL DAILY
Status: DISCONTINUED | OUTPATIENT
Start: 2020-09-04 | End: 2020-09-04

## 2020-09-03 RX ORDER — CARVEDILOL 12.5 MG/1
12.5 TABLET ORAL 2 TIMES DAILY
Status: DISCONTINUED | OUTPATIENT
Start: 2020-09-03 | End: 2020-09-04

## 2020-09-03 RX ORDER — TALC
6 POWDER (GRAM) TOPICAL NIGHTLY PRN
Status: DISCONTINUED | OUTPATIENT
Start: 2020-09-03 | End: 2020-09-07 | Stop reason: HOSPADM

## 2020-09-03 RX ORDER — GABAPENTIN 400 MG/1
400 CAPSULE ORAL NIGHTLY
Status: DISCONTINUED | OUTPATIENT
Start: 2020-09-03 | End: 2020-09-07 | Stop reason: HOSPADM

## 2020-09-03 RX ORDER — LETROZOLE 2.5 MG/1
2.5 TABLET, FILM COATED ORAL NIGHTLY
Status: DISCONTINUED | OUTPATIENT
Start: 2020-09-03 | End: 2020-09-07 | Stop reason: HOSPADM

## 2020-09-03 RX ORDER — ACETAMINOPHEN AND CODEINE PHOSPHATE 300; 30 MG/1; MG/1
1 TABLET ORAL
Status: COMPLETED | OUTPATIENT
Start: 2020-09-03 | End: 2020-09-03

## 2020-09-03 RX ORDER — IPRATROPIUM BROMIDE AND ALBUTEROL SULFATE 2.5; .5 MG/3ML; MG/3ML
3 SOLUTION RESPIRATORY (INHALATION) EVERY 6 HOURS PRN
Status: DISCONTINUED | OUTPATIENT
Start: 2020-09-03 | End: 2020-09-06

## 2020-09-03 RX ORDER — ACETAMINOPHEN AND CODEINE PHOSPHATE 300; 30 MG/1; MG/1
1 TABLET ORAL EVERY 6 HOURS PRN
Status: DISCONTINUED | OUTPATIENT
Start: 2020-09-03 | End: 2020-09-07 | Stop reason: HOSPADM

## 2020-09-03 RX ORDER — ONDANSETRON 2 MG/ML
4 INJECTION INTRAMUSCULAR; INTRAVENOUS EVERY 8 HOURS PRN
Status: DISCONTINUED | OUTPATIENT
Start: 2020-09-03 | End: 2020-09-07 | Stop reason: HOSPADM

## 2020-09-03 RX ORDER — FAMOTIDINE 20 MG/1
20 TABLET, FILM COATED ORAL NIGHTLY
Status: DISCONTINUED | OUTPATIENT
Start: 2020-09-03 | End: 2020-09-07 | Stop reason: HOSPADM

## 2020-09-03 RX ORDER — PROMETHAZINE HYDROCHLORIDE 25 MG/1
25 TABLET ORAL EVERY 6 HOURS PRN
Status: DISCONTINUED | OUTPATIENT
Start: 2020-09-03 | End: 2020-09-07 | Stop reason: HOSPADM

## 2020-09-03 RX ORDER — CITALOPRAM 20 MG/1
20 TABLET, FILM COATED ORAL NIGHTLY
Status: DISCONTINUED | OUTPATIENT
Start: 2020-09-03 | End: 2020-09-07 | Stop reason: HOSPADM

## 2020-09-03 RX ORDER — SODIUM CHLORIDE 0.9 % (FLUSH) 0.9 %
10 SYRINGE (ML) INJECTION
Status: DISCONTINUED | OUTPATIENT
Start: 2020-09-03 | End: 2020-09-07 | Stop reason: HOSPADM

## 2020-09-03 RX ORDER — IBUPROFEN 200 MG
16 TABLET ORAL
Status: DISCONTINUED | OUTPATIENT
Start: 2020-09-03 | End: 2020-09-07 | Stop reason: HOSPADM

## 2020-09-03 RX ORDER — INSULIN ASPART 100 [IU]/ML
4 INJECTION, SOLUTION INTRAVENOUS; SUBCUTANEOUS
Status: DISCONTINUED | OUTPATIENT
Start: 2020-09-04 | End: 2020-09-05

## 2020-09-03 RX ORDER — GLUCAGON 1 MG
1 KIT INJECTION
Status: DISCONTINUED | OUTPATIENT
Start: 2020-09-03 | End: 2020-09-07 | Stop reason: HOSPADM

## 2020-09-03 RX ORDER — INSULIN ASPART 100 [IU]/ML
1-10 INJECTION, SOLUTION INTRAVENOUS; SUBCUTANEOUS
Status: DISCONTINUED | OUTPATIENT
Start: 2020-09-03 | End: 2020-09-07 | Stop reason: HOSPADM

## 2020-09-03 RX ORDER — ATORVASTATIN CALCIUM 20 MG/1
40 TABLET, FILM COATED ORAL DAILY
Status: DISCONTINUED | OUTPATIENT
Start: 2020-09-04 | End: 2020-09-07 | Stop reason: HOSPADM

## 2020-09-03 RX ORDER — IBUPROFEN 200 MG
24 TABLET ORAL
Status: DISCONTINUED | OUTPATIENT
Start: 2020-09-03 | End: 2020-09-07 | Stop reason: HOSPADM

## 2020-09-03 RX ADMIN — VANCOMYCIN HYDROCHLORIDE 1750 MG: 100 INJECTION, POWDER, LYOPHILIZED, FOR SOLUTION INTRAVENOUS at 06:09

## 2020-09-03 RX ADMIN — INSULIN DETEMIR 12 UNITS: 100 INJECTION, SOLUTION SUBCUTANEOUS at 10:09

## 2020-09-03 RX ADMIN — INSULIN ASPART 2 UNITS: 100 INJECTION, SOLUTION INTRAVENOUS; SUBCUTANEOUS at 10:09

## 2020-09-03 RX ADMIN — GABAPENTIN 400 MG: 400 CAPSULE ORAL at 09:09

## 2020-09-03 RX ADMIN — ACETAMINOPHEN AND CODEINE PHOSPHATE 1 TABLET: 300; 30 TABLET ORAL at 05:09

## 2020-09-03 RX ADMIN — CITALOPRAM HYDROBROMIDE 20 MG: 20 TABLET ORAL at 09:09

## 2020-09-03 RX ADMIN — CARVEDILOL 12.5 MG: 12.5 TABLET, FILM COATED ORAL at 09:09

## 2020-09-03 RX ADMIN — AMLODIPINE BESYLATE 10 MG: 10 TABLET ORAL at 09:09

## 2020-09-03 RX ADMIN — FAMOTIDINE 20 MG: 20 TABLET, FILM COATED ORAL at 09:09

## 2020-09-03 RX ADMIN — LETROZOLE 2.5 MG: 2.5 TABLET ORAL at 11:09

## 2020-09-03 NOTE — ED PROVIDER NOTES
Encounter Date: 9/3/2020    SCRIBE #1 NOTE: I, Brook Herzog, am scribing for, and in the presence of,  Jodi Way MD. I have scribed the entire note.       History     Chief Complaint   Patient presents with    Wound Check     Patient sent to ER for further evaluation of right foot/toes wound.      The patient is a 64 y.o. female with PMHx of DM Type 2, HTN, right foot cellulitis, dry gangrene, and HTN who presents to the ED for a wound check on her right foot. She has chronic swelling and wounds in her right foot. She saw a podiatrist on 8/4 and had her toenails debrided. The top of her right foot turned black about one week ago. She recently finished a course of Clindamycin but states that her foot has still been getting worse with increased redness and pain. Pain worse with movement or pressure. She denies any fever or chills.     The history is provided by the patient and medical records. No  was used.     Review of patient's allergies indicates:   Allergen Reactions    Cyclobenzaprine Hallucinations    Erythromycin      Stomach upset     Past Medical History:   Diagnosis Date    Anxiety     Arthritis     DDD, Lumbar    Breast cancer 1/2013    left breast- invasive mammary carcinoma, ER/NM positive, Her2 negative    Carotid artery stenosis 8/13/2018 6/22/2018: Carotid Duplex: SHANELL: Moderate plaquing - 2.0 m/s - <50%. LICA: Moderate plaquing - 1.6 m/s - <50%.    Cataract     Choledocholithiasis     s/p ERCP and stent placement    Chronic obstructive pulmonary disease 6/8/2018    Chronic venous insufficiency     Colon cancer 2001    CRI (chronic renal insufficiency)     one kidney    Dialysis AV fistula malfunction     ESRD (end stage renal disease) on dialysis     Former smoker 6/8/2018    GERD (gastroesophageal reflux disease)     History of acute pancreatitis 2009 2/09 s/p sphincterotomy    History of cerebrovascular accident 6/8/2018    History of colon  cancer     s/p sigmoid colectomy    HTN (hypertension), benign     Hypercholesterolemia     Hyperlipidemia     Hypoxemia 6/8/2018    Intracerebral hemorrhage     Kidney stone     left    Lymphedema of both lower extremities     Obesity     Pancreatitis     Pancreatitis 2008    Physical deconditioning     Pulmonary edema     Sacroiliac joint pain     Spinal stenosis     Spinal stenosis, lumbar     Stroke 12/2012    Tobacco abuse     Type 2 diabetes mellitus with neurological manifestations, controlled     Neuropathy     Past Surgical History:   Procedure Laterality Date    AV FISTULA PLACEMENT Right 5/28/2018    Procedure: CREATION -FISTULA-AV;  Surgeon: RICK Pollard III, MD;  Location: [facility] OR [address] [place] [address];  Service: Peripheral Vascular;  Laterality: Right;    BREAST BIOPSY  1/2012    left breast invasive mammary carcinoma (lateral bx) and intraductal papilloma (medial bx)    BREAST BIOPSY  1999    rt breast FA    CATARACT EXTRACTION W/  INTRAOCULAR LENS IMPLANT Right 1/24/2019        CATARACT EXTRACTION W/  INTRAOCULAR LENS IMPLANT Left 1/31/2019    Procedure: EXTRACTION, CATARACT, WITH IOL INSERTION;  Surgeon: Immanuel Moncada MD;  Location: [facility];  Service: Ophthalmology;  Laterality: Left;    CHOLECYSTECTOMY  2001    COLON SURGERY      HERNIA REPAIR      left nephrectomy      atrophic kidney and hydronephrosis    left oopherectomy  2001    MASTECTOMY  2013    left    NEPHRECTOMY  2011    lap    REVISION OF ARTERIOVENOUS FISTULA Right 8/15/2018    Procedure: REVISION, AV FISTULA;  Surgeon: RICK Pollard III, MD;  Location: [facility] OR [address] [place] [address];  Service: Peripheral Vascular;  Laterality: Right;    SIGMOIDECTOMY  2001    TOTAL REDUCTION MAMMOPLASTY Right 2013     Family History   Problem Relation Age of Onset    Arthritis Mother     Heart disease Mother     Diabetes Father     Heart disease Father     Celiac disease Sister     Breast cancer Maternal Grandmother          possible-  patient unsure    Cancer Maternal Grandmother     Heart disease Maternal Grandmother     Cancer Maternal Aunt     Ovarian cancer Neg Hx     Glaucoma Neg Hx     Macular degeneration Neg Hx      Social History     Tobacco Use    Smoking status: Current Some Day Smoker     Packs/day: 0.50     Years: 28.00     Pack years: 14.00     Types: Cigarettes     Start date: 1990     Last attempt to quit: 2018     Years since quittin.6    Smokeless tobacco: Never Used    Tobacco comment: anxious   Substance Use Topics    Alcohol use: No    Drug use: No     Review of Systems   Constitutional: Negative for chills and fever.   HENT: Negative for congestion and sore throat.    Respiratory: Negative for cough, chest tightness and shortness of breath.    Cardiovascular: Positive for leg swelling (chronic b/l, increased on R). Negative for chest pain.   Gastrointestinal: Negative for diarrhea, nausea and vomiting.   Genitourinary: Negative for decreased urine volume, difficulty urinating and frequency.   Musculoskeletal: Positive for arthralgias.        Right foot pain   Skin: Positive for color change (right foot) and wound (Right foot). Negative for rash.   Allergic/Immunologic: Negative for immunocompromised state.   Neurological: Negative for weakness, light-headedness, numbness and headaches.   Hematological: Does not bruise/bleed easily.       Physical Exam     Initial Vitals [20 1447]   BP Pulse Resp Temp SpO2   (!) 176/93 84 17 98.8 °F (37.1 °C) 95 %      MAP       --         Physical Exam    Nursing note and vitals reviewed.  Constitutional: She appears well-developed and well-nourished. She is not diaphoretic. No distress.   HENT:   Head: Normocephalic and atraumatic.   Nose: Nose normal.   Eyes: EOM are normal. Pupils are equal, round, and reactive to light. No scleral icterus.   Neck: Normal range of motion. Neck supple.   Cardiovascular: Normal rate, regular rhythm and intact distal  pulses.   Pulmonary/Chest: Breath sounds normal. No respiratory distress. She has no wheezes. She has no rhonchi. She has no rales. She exhibits no tenderness.   Abdominal: Soft. Bowel sounds are normal. She exhibits no distension. There is no abdominal tenderness.   Musculoskeletal: Normal range of motion. Tenderness (right toe) and edema present.      Comments: Bilateral pitting edema 2+   Neurological: She is alert and oriented to person, place, and time. She has normal strength.   Skin: Skin is warm and dry. No rash noted.    chronic statis dermititis of b/l LEs. Toe on right foot with distal necrosis and purulent cellulitis surrounding.     Psychiatric: She has a normal mood and affect. Her behavior is normal. Thought content normal.                     ED Course   Procedures  Labs Reviewed   CBC W/ AUTO DIFFERENTIAL - Abnormal; Notable for the following components:       Result Value    RBC 3.55 (*)     Hemoglobin 10.5 (*)     Hematocrit 33.6 (*)     Mean Corpuscular Hemoglobin Conc 31.3 (*)     RDW 18.5 (*)     Immature Granulocytes 1.1 (*)     Immature Grans (Abs) 0.10 (*)     Lymph% 16.6 (*)     All other components within normal limits   COMPREHENSIVE METABOLIC PANEL - Abnormal; Notable for the following components:    Sodium 130 (*)     Chloride 92 (*)     Glucose 331 (*)     BUN, Bld 38 (*)     Creatinine 7.1 (*)     eGFR if  6.4 (*)     eGFR if non  5.5 (*)     All other components within normal limits   PHOSPHORUS - Abnormal; Notable for the following components:    Phosphorus 5.4 (*)     All other components within normal limits   C-REACTIVE PROTEIN - Abnormal; Notable for the following components:    CRP 49.2 (*)     All other components within normal limits   SEDIMENTATION RATE - Abnormal; Notable for the following components:    Sed Rate 82 (*)     All other components within normal limits   HEMOGLOBIN A1C - Abnormal; Notable for the following components:     Hemoglobin A1C 8.8 (*)     Estimated Avg Glucose 206 (*)     All other components within normal limits    Narrative:     add HMGA1C [id number] per Dr Hodges  09/03/2020  21:18    MAGNESIUM   APTT   PROTIME-INR   SARS-COV-2 RNA AMPLIFICATION, QUAL   HEMOGLOBIN A1C   POCT GLUCOSE, HAND-HELD DEVICE          Imaging Results          X-Ray Foot Complete Right (Final result)  Result time 09/03/20 16:50:25    Final result by Nicolás Ha MD (09/03/20 16:50:25)                 Impression:      Healed fracture of the right 5th metatarsal.    Diffuse soft tissue swelling.  Additional evaluation, as clinically warranted.      Electronically signed by: Nicolás Ha MD  Date:    09/03/2020  Time:    16:50             Narrative:    EXAMINATION:  XR FOOT COMPLETE 3 VIEW RIGHT    CLINICAL HISTORY:  Pain in unspecified foot    TECHNIQUE:  AP, lateral, and oblique views of the right foot were performed.    COMPARISON:  02/27/2018.    FINDINGS:  There is diffuse demineralization of the osseous structures.  There is a healed fracture involving the head of the right 5th metatarsal.  No new fracture is identified.    There is joint space narrowing throughout the right foot.  The Lisfranc articulation appears intact.    There is diffuse soft tissue swelling throughout the right foot.  Vascular calcifications are present.  No radiopaque foreign body is identified.                                 Medical Decision Making:   History:   Old Medical Records: I decided to obtain old medical records.  Old Records Summarized: other records.       <> Summary of Records: DM Type 2, right foot cellulitis and dry gangrene, HTN. No relief from outpatient clindamycin. Saw podiatry where they debrided the nails on 8/4; however, worsening erythema.   Initial Assessment:   Right toe with distal necrosis and surrounding cellulitis, although pt appears nontoxic  Differential Diagnosis:   Cellultis, OM, dry gangrene, wet gangrene  Clinical Tests:   Lab Tests:  Ordered and Reviewed  Radiological Study: Ordered and Reviewed  ED Management:  Will start on Vancomycin, obtain labs and XRs to evaluate for OM. Planned admission.   Other:   I have discussed this case with another health care provider.       <> Summary of the Discussion: S/o Dr. Jarrod Still Attestation:   Angeloibe #1: I performed the above scribed service and the documentation accurately describes the services I performed. I attest to the accuracy of the note.                          Clinical Impression:       ICD-10-CM ICD-9-CM   1. Toe gangrene  I96 785.4   2. Foot pain  M79.673 729.5   3. Chest pain  R07.9 786.50   4. ESRD (end stage renal disease) on dialysis  N18.6 585.6    Z99.2 V45.11   5. Gangrene of right foot  I96 785.4   6. PVD (peripheral vascular disease)  I73.9 443.9             ED Disposition Condition    Admit                           Jodi Way MD  09/04/20 [id number]

## 2020-09-03 NOTE — PROGRESS NOTES
LINKS immunization registry updated  Care Everywhere updated  Health Maintenance updated  Chart reviewed for overdue Proactive [facility] Encounters ([facility]) health maintenance testing (CRS, Breast Ca, Diabetic Eye Exam)   Orders entered: HgA1c

## 2020-09-03 NOTE — PROVIDER PROGRESS NOTES - EMERGENCY DEPT.
Encounter Date: 9/3/2020    ED Physician Progress Notes        Physician Note:   Signed out to me.  Patient sent in by Podiatry for worsening gangrene of her foot.  Peers dry.  Labs noted.  X-ray noted.  Concern for need to remove toe will discussed with vascular.  She has a dopplerable DP pulse.    Update:  Discussed with vascular, no intervention tonight likely podiatry consult in the morning, based on picture toe they think that likely will need to come off and that is reason patient was sent to hospital..  They will see patient in the morning, consult placed.  Admit to Hospital Medicine.  She remains HD stable.

## 2020-09-03 NOTE — ED NOTES
Patient identifiers verified and correct for Kylie Christine  LOC: The patient is awake, alert and aware of environment with an appropriate affect, the patient is oriented x 3 and speaking appropriately.   APPEARANCE: Patient appears comfortable and in no acute distress, patient is clean and well groomed.  SKIN: The skin is warm and dry, color consistent with ethnicity, patient has normal skin turgor and moist mucus membranes  MUSCULOSKELETAL: Patient moving all extremities spontaneously, Pt has wound to right great toe and second toe.  RESPIRATORY: Airway is open and patent, respirations are spontaneous, patient has a normal effort and rate, no accessory muscle use noted, pt placed on continuous pulse ox with O2 sats noted at 97% on room air.  CARDIAC: Pt placed on cardiac monitor. Patient has a normal rate and regular rhythm, no edema noted, capillary refill < 3 seconds.   GASTRO: Soft and non tender to palpation, no distention noted, normoactive bowel sounds present in all four quadrants. Pt states bowel movements have been regular.  : Pt denies any pain or frequency with urination.  NEURO: Pt opens eyes spontaneously, behavior appropriate to situation, follows commands, facial expression symmetrical, bilateral hand grasp equal and even, purposeful motor response noted, normal sensation in all extremities when touched with a finger.

## 2020-09-03 NOTE — FIRST PROVIDER EVALUATION
Emergency Department TeleTriage Encounter Note      CHIEF COMPLAINT    Chief Complaint   Patient presents with    Wound Check     Patient sent to ER for further evaluation of right foot/toes wound.        VITAL SIGNS   Initial Vitals [09/03/20 1447]   BP Pulse Resp Temp SpO2   (!) 176/93 84 17 98.8 °F (37.1 °C) 95 %      MAP       --            ALLERGIES    Review of patient's allergies indicates:   Allergen Reactions    Cyclobenzaprine Hallucinations    Erythromycin      Stomach upset       PROVIDER TRIAGE NOTE  64-year-old female with history of ESRD on dialysis MWF is  presenting to the ED for evaluation of right foot pain.  Patient was sent in by Podiatry for possible ft infection.  Patient reports redness to her great toe that is spreading.  Denies any fevers or chills.  She reports finishing a round of antibiotics last week with no improvement.  Will place order for IV access, lab work and x-ray.  Final disposition pending per ED provider      ORDERS  Labs Reviewed   CBC W/ AUTO DIFFERENTIAL   COMPREHENSIVE METABOLIC PANEL   MAGNESIUM   PHOSPHORUS   APTT   PROTIME-INR   C-REACTIVE PROTEIN   SEDIMENTATION RATE       ED Orders (720h ago, onward)    Start Ordered     Status Ordering Provider    09/03/20 1523 09/03/20 1523  C-reactive protein  STAT  Collect    Ordered JOHNYKUTTY, CARLOS MANUEL    09/03/20 1523 09/03/20 1523  Sedimentation rate  STAT  Collect    Ordered JOHNYKUTTY, CARLOS MANUEL    09/03/20 1523 09/03/20 1523  Insert Saline lock IV  Once      Ordered JOHNYKUTTY, CARLOS MANUEL    09/03/20 1523 09/03/20 1523  X-Ray Foot Complete Right  1 time imaging      Ordered JOHNYKUTTY, CARLOS MANUEL    09/03/20 1522 09/03/20 1523  APTT  STAT  Collect    Ordered JOHNYKUTTY, CARLOS MANUEL    09/03/20 1522 09/03/20 1523  Protime-INR  STAT  Collect    Ordered JOHNYKUTTY, CARLOS MANUEL    09/03/20 1521 09/03/20 1523  CBC auto differential  STAT  Collect    Ordered JOHNYKUTTY, CARLOS MANUEL    09/03/20 1521 09/03/20 1523  Comprehensive metabolic panel  STAT  Collect     Ordered JOHNYKUTTY, CARLOS MANUEL    09/03/20 1521 09/03/20 1523  Magnesium  STAT  Collect    Ordered JOHNYKUTTY, CARLOS MANUEL    09/03/20 1521 09/03/20 1523  Phosphorus  STAT  Collect    Ordered JM, CARLOS MANUEL            Virtual Visit Note: The provider triage portion of this emergency department evaluation and documentation was performed via [employer], a HIPAA-compliant telemedicine application, in concert with a tele-presenter in the room. A face to face patient evaluation with one of my colleagues will occur once the patient is placed in an emergency department room.      DISCLAIMER: This note was prepared with [employer] voice recognition transcription software. Garbled syntax, mangled pronouns, and other bizarre constructions may be attributed to that software system.

## 2020-09-04 LAB
ALBUMIN SERPL BCP-MCNC: 3.4 G/DL (ref 3.5–5.2)
ALP SERPL-CCNC: 59 U/L (ref 55–135)
ALT SERPL W/O P-5'-P-CCNC: 15 U/L (ref 10–44)
ANION GAP SERPL CALC-SCNC: 15 MMOL/L (ref 8–16)
AST SERPL-CCNC: 11 U/L (ref 10–40)
BASOPHILS # BLD AUTO: 0.07 K/UL (ref 0–0.2)
BASOPHILS NFR BLD: 0.8 % (ref 0–1.9)
BILIRUB SERPL-MCNC: 0.4 MG/DL (ref 0.1–1)
BUN SERPL-MCNC: 46 MG/DL (ref 8–23)
CALCIUM SERPL-MCNC: 9 MG/DL (ref 8.7–10.5)
CHLORIDE SERPL-SCNC: 91 MMOL/L (ref 95–110)
CO2 SERPL-SCNC: 23 MMOL/L (ref 23–29)
CREAT SERPL-MCNC: 7.9 MG/DL (ref 0.5–1.4)
DIFFERENTIAL METHOD: ABNORMAL
EOSINOPHIL # BLD AUTO: 0.1 K/UL (ref 0–0.5)
EOSINOPHIL NFR BLD: 1.4 % (ref 0–8)
ERYTHROCYTE [DISTWIDTH] IN BLOOD BY AUTOMATED COUNT: 18.9 % (ref 11.5–14.5)
EST. GFR  (AFRICAN AMERICAN): 5.6 ML/MIN/1.73 M^2
EST. GFR  (NON AFRICAN AMERICAN): 4.9 ML/MIN/1.73 M^2
GLUCOSE SERPL-MCNC: 240 MG/DL (ref 70–110)
HCT VFR BLD AUTO: 34.8 % (ref 37–48.5)
HGB BLD-MCNC: 10.4 G/DL (ref 12–16)
IMM GRANULOCYTES # BLD AUTO: 0.15 K/UL (ref 0–0.04)
IMM GRANULOCYTES NFR BLD AUTO: 1.7 % (ref 0–0.5)
LYMPHOCYTES # BLD AUTO: 1.9 K/UL (ref 1–4.8)
LYMPHOCYTES NFR BLD: 20.6 % (ref 18–48)
MAGNESIUM SERPL-MCNC: 1.9 MG/DL (ref 1.6–2.6)
MCH RBC QN AUTO: 29.1 PG (ref 27–31)
MCHC RBC AUTO-ENTMCNC: 29.9 G/DL (ref 32–36)
MCV RBC AUTO: 97 FL (ref 82–98)
MONOCYTES # BLD AUTO: 0.8 K/UL (ref 0.3–1)
MONOCYTES NFR BLD: 9.3 % (ref 4–15)
NEUTROPHILS # BLD AUTO: 6 K/UL (ref 1.8–7.7)
NEUTROPHILS NFR BLD: 66.2 % (ref 38–73)
NRBC BLD-RTO: 0 /100 WBC
PHOSPHATE SERPL-MCNC: 6.3 MG/DL (ref 2.7–4.5)
PLATELET # BLD AUTO: 321 K/UL (ref 150–350)
PMV BLD AUTO: 10.8 FL (ref 9.2–12.9)
POCT GLUCOSE: 203 MG/DL (ref 70–110)
POCT GLUCOSE: 269 MG/DL (ref 70–110)
POTASSIUM SERPL-SCNC: 5.3 MMOL/L (ref 3.5–5.1)
PROT SERPL-MCNC: 7.6 G/DL (ref 6–8.4)
RBC # BLD AUTO: 3.58 M/UL (ref 4–5.4)
SODIUM SERPL-SCNC: 129 MMOL/L (ref 136–145)
WBC # BLD AUTO: 9.07 K/UL (ref 3.9–12.7)

## 2020-09-04 PROCEDURE — 99223 PR INITIAL HOSPITAL CARE,LEVL III: ICD-10-PCS | Mod: AI,GC,, | Performed by: HOSPITALIST

## 2020-09-04 PROCEDURE — 99223 PR INITIAL HOSPITAL CARE,LEVL III: ICD-10-PCS | Mod: ,,, | Performed by: SURGERY

## 2020-09-04 PROCEDURE — 80053 COMPREHEN METABOLIC PANEL: CPT

## 2020-09-04 PROCEDURE — 63600175 PHARM REV CODE 636 W HCPCS: Performed by: STUDENT IN AN ORGANIZED HEALTH CARE EDUCATION/TRAINING PROGRAM

## 2020-09-04 PROCEDURE — 90935 HEMODIALYSIS ONE EVALUATION: CPT | Mod: ,,, | Performed by: NURSE PRACTITIONER

## 2020-09-04 PROCEDURE — 99223 PR INITIAL HOSPITAL CARE,LEVL III: ICD-10-PCS | Mod: 25,,, | Performed by: NURSE PRACTITIONER

## 2020-09-04 PROCEDURE — 20600001 HC STEP DOWN PRIVATE ROOM

## 2020-09-04 PROCEDURE — 84100 ASSAY OF PHOSPHORUS: CPT

## 2020-09-04 PROCEDURE — 99222 PR INITIAL HOSPITAL CARE,LEVL II: ICD-10-PCS | Mod: ,,, | Performed by: PODIATRIST

## 2020-09-04 PROCEDURE — 93926 LOWER EXTREMITY STUDY: CPT | Performed by: SURGERY

## 2020-09-04 PROCEDURE — 25000003 PHARM REV CODE 250: Performed by: STUDENT IN AN ORGANIZED HEALTH CARE EDUCATION/TRAINING PROGRAM

## 2020-09-04 PROCEDURE — 99223 1ST HOSP IP/OBS HIGH 75: CPT | Mod: AI,GC,, | Performed by: HOSPITALIST

## 2020-09-04 PROCEDURE — 99223 1ST HOSP IP/OBS HIGH 75: CPT | Mod: 25,,, | Performed by: NURSE PRACTITIONER

## 2020-09-04 PROCEDURE — 90935 PR HEMODIALYSIS, ONE EVALUATION: ICD-10-PCS | Mod: ,,, | Performed by: NURSE PRACTITIONER

## 2020-09-04 PROCEDURE — 99222 1ST HOSP IP/OBS MODERATE 55: CPT | Mod: ,,, | Performed by: PODIATRIST

## 2020-09-04 PROCEDURE — 25000003 PHARM REV CODE 250: Performed by: NURSE PRACTITIONER

## 2020-09-04 PROCEDURE — 83735 ASSAY OF MAGNESIUM: CPT

## 2020-09-04 PROCEDURE — 36415 COLL VENOUS BLD VENIPUNCTURE: CPT

## 2020-09-04 PROCEDURE — 99223 1ST HOSP IP/OBS HIGH 75: CPT | Mod: ,,, | Performed by: SURGERY

## 2020-09-04 PROCEDURE — 85025 COMPLETE CBC W/AUTO DIFF WBC: CPT

## 2020-09-04 PROCEDURE — 93922 UPR/L XTREMITY ART 2 LEVELS: CPT | Performed by: SURGERY

## 2020-09-04 PROCEDURE — 80100016 HC MAINTENANCE HEMODIALYSIS

## 2020-09-04 RX ORDER — HYDRALAZINE HYDROCHLORIDE 25 MG/1
25 TABLET, FILM COATED ORAL EVERY 12 HOURS
Status: DISCONTINUED | OUTPATIENT
Start: 2020-09-04 | End: 2020-09-07 | Stop reason: HOSPADM

## 2020-09-04 RX ORDER — CARVEDILOL 25 MG/1
25 TABLET ORAL 2 TIMES DAILY WITH MEALS
Status: DISCONTINUED | OUTPATIENT
Start: 2020-09-04 | End: 2020-09-07 | Stop reason: HOSPADM

## 2020-09-04 RX ORDER — SODIUM CHLORIDE 9 MG/ML
INJECTION, SOLUTION INTRAVENOUS ONCE
Status: COMPLETED | OUTPATIENT
Start: 2020-09-04 | End: 2020-09-04

## 2020-09-04 RX ADMIN — AMLODIPINE BESYLATE 10 MG: 10 TABLET ORAL at 02:09

## 2020-09-04 RX ADMIN — FAMOTIDINE 20 MG: 20 TABLET, FILM COATED ORAL at 08:09

## 2020-09-04 RX ADMIN — INSULIN ASPART 3 UNITS: 100 INJECTION, SOLUTION INTRAVENOUS; SUBCUTANEOUS at 08:09

## 2020-09-04 RX ADMIN — CITALOPRAM HYDROBROMIDE 20 MG: 20 TABLET ORAL at 08:09

## 2020-09-04 RX ADMIN — LETROZOLE 2.5 MG: 2.5 TABLET ORAL at 08:09

## 2020-09-04 RX ADMIN — INSULIN ASPART 4 UNITS: 100 INJECTION, SOLUTION INTRAVENOUS; SUBCUTANEOUS at 08:09

## 2020-09-04 RX ADMIN — HYDRALAZINE HYDROCHLORIDE 25 MG: 25 TABLET, FILM COATED ORAL at 08:09

## 2020-09-04 RX ADMIN — ATORVASTATIN CALCIUM 40 MG: 20 TABLET, FILM COATED ORAL at 02:09

## 2020-09-04 RX ADMIN — SODIUM CHLORIDE: 0.9 INJECTION, SOLUTION INTRAVENOUS at 08:09

## 2020-09-04 RX ADMIN — CARVEDILOL 25 MG: 25 TABLET, FILM COATED ORAL at 07:09

## 2020-09-04 RX ADMIN — GABAPENTIN 400 MG: 400 CAPSULE ORAL at 08:09

## 2020-09-04 NOTE — ASSESSMENT & PLAN NOTE
Lab Results   Component Value Date    HGBA1C 9.9 (H) 03/05/2020     Home meds: levemir 45U BID, Novolog 15U TIDWM    Plan:  - HbA1c ordered  - Detemir 12U qhs  - Aspart 4 TIDWM  - MDSS  - POCT glucose ACHS  - Diabetic diet

## 2020-09-04 NOTE — SUBJECTIVE & OBJECTIVE
Past Medical History:   Diagnosis Date    Anxiety     Arthritis     DDD, Lumbar    Breast cancer 1/2013    left breast- invasive mammary carcinoma, ER/SC positive, Her2 negative    Carotid artery stenosis 8/13/2018 6/22/2018: Carotid Duplex: SHANELL: Moderate plaquing - 2.0 m/s - <50%. LICA: Moderate plaquing - 1.6 m/s - <50%.    Cataract     Choledocholithiasis     s/p ERCP and stent placement    Chronic obstructive pulmonary disease 6/8/2018    Chronic venous insufficiency     Colon cancer 2001    CRI (chronic renal insufficiency)     one kidney    Dialysis AV fistula malfunction     ESRD (end stage renal disease) on dialysis     Former smoker 6/8/2018    GERD (gastroesophageal reflux disease)     History of acute pancreatitis 2009 2/09 s/p sphincterotomy    History of cerebrovascular accident 6/8/2018    History of colon cancer     s/p sigmoid colectomy    HTN (hypertension), benign     Hypercholesterolemia     Hyperlipidemia     Hypoxemia 6/8/2018    Intracerebral hemorrhage     Kidney stone     left    Lymphedema of both lower extremities     Obesity     Pancreatitis     Pancreatitis 2008    Physical deconditioning     Pulmonary edema     Sacroiliac joint pain     Spinal stenosis     Spinal stenosis, lumbar     Stroke 12/2012    Tobacco abuse     Type 2 diabetes mellitus with neurological manifestations, controlled     Neuropathy       Past Surgical History:   Procedure Laterality Date    AV FISTULA PLACEMENT Right 5/28/2018    Procedure: CREATION -FISTULA-AV;  Surgeon: RICK Pollard III, MD;  Location: [facility] OR [address] [place] [address];  Service: Peripheral Vascular;  Laterality: Right;    BREAST BIOPSY  1/2012    left breast invasive mammary carcinoma (lateral bx) and intraductal papilloma (medial bx)    BREAST BIOPSY  1999    rt breast FA    CATARACT EXTRACTION W/  INTRAOCULAR LENS IMPLANT Right 1/24/2019        CATARACT EXTRACTION W/  INTRAOCULAR LENS IMPLANT Left 1/31/2019     Procedure: EXTRACTION, CATARACT, WITH IOL INSERTION;  Surgeon: Immanuel Moncada MD;  Location: [facility];  Service: Ophthalmology;  Laterality: Left;    CHOLECYSTECTOMY  2001    COLON SURGERY      HERNIA REPAIR      left nephrectomy      atrophic kidney and hydronephrosis    left oopherectomy  2001    MASTECTOMY  2013    left    NEPHRECTOMY  2011    lap    REVISION OF ARTERIOVENOUS FISTULA Right 8/15/2018    Procedure: REVISION, AV FISTULA;  Surgeon: RICK Pollard III, MD;  [facility];  Service: Peripheral Vascular;  Laterality: Right;    SIGMOIDECTOMY  2001    TOTAL REDUCTION MAMMOPLASTY Right 2013       Review of patient's allergies indicates:   Allergen Reactions    Cyclobenzaprine Hallucinations    Erythromycin      Stomach upset     Current Facility-Administered Medications   Medication Frequency    0.9%  NaCl infusion Once    acetaminophen tablet 650 mg Q4H PRN    acetaminophen-codeine 300-30mg per tablet 1 tablet Q6H PRN    albuterol-ipratropium 2.5 mg-0.5 mg/3 mL nebulizer solution 3 mL Q6H PRN    amLODIPine tablet 10 mg QAM    atorvastatin tablet 40 mg Daily    carvediloL tablet 12.5 mg BID    citalopram tablet 20 mg Nightly    dextrose 50% injection 12.5 g PRN    dextrose 50% injection 25 g PRN    famotidine tablet 20 mg QHS    gabapentin capsule 400 mg QHS    glucagon (human recombinant) injection 1 mg PRN    glucose chewable tablet 16 g PRN    glucose chewable tablet 24 g PRN    hydrALAZINE tablet 25 mg Daily    insulin aspart U-100 pen 1-10 Units QID (AC + HS) PRN    insulin aspart U-100 pen 4 Units TIDWM    insulin detemir U-100 pen 12 Units QHS    letrozole tablet 2.5 mg Nightly    melatonin tablet 6 mg Nightly PRN    ondansetron injection 4 mg Q8H PRN    promethazine tablet 25 mg Q6H PRN    sodium chloride 0.9% flush 10 mL PRN     Facility-Administered Medications Ordered in Other Encounters   Medication Frequency    mupirocin 2 % ointment On Call  Procedure     Family History     Problem Relation (Age of Onset)    Arthritis Mother    Breast cancer Maternal Grandmother    Cancer Maternal Grandmother, Maternal Aunt    Celiac disease Sister    Diabetes Father    Heart disease Mother, Father, Maternal Grandmother        Tobacco Use    Smoking status: Current Some Day Smoker     Packs/day: 0.50     Years: 28.00     Pack years: 14.00     Types: Cigarettes     Start date: 1990     Last attempt to quit: 2018     Years since quittin.6    Smokeless tobacco: Never Used    Tobacco comment: anxious   Substance and Sexual Activity    Alcohol use: No    Drug use: No    Sexual activity: Never     Partners: Male     Review of Systems   Constitutional: Negative.    HENT: Negative.    Eyes: Negative.    Respiratory: Positive for cough and shortness of breath. Negative for apnea, choking, chest tightness, wheezing and stridor.    Cardiovascular: Negative.    Gastrointestinal: Negative.    Genitourinary: Positive for decreased urine volume (anuric). Negative for flank pain and hematuria.   Musculoskeletal: Negative.    Skin: Positive for wound (R foot).   Neurological: Negative.    Psychiatric/Behavioral: Negative.      Objective:     Vital Signs (Most Recent):  Temp: 98 °F (36.7 °C) (20 0820)  Pulse: 75 (20 0845)  Resp: 18 (20 0820)  BP: (!) 171/71 (20 0845)  SpO2: 95 % (20 0802)  O2 Device (Oxygen Therapy): room air (20 08) Vital Signs (24h Range):  Temp:  [97.1 °F (36.2 °C)-98.8 °F (37.1 °C)] 98 °F (36.7 °C)  Pulse:  [68-84] 75  Resp:  [16-18] 18  SpO2:  [95 %-99 %] 95 %  BP: (140-202)/() 171/71     Weight: 125 kg (275 lb 9.2 oz) (20 1447)  Body mass index is 47.3 kg/m².  Body surface area is 2.38 meters squared.    I/O last 3 completed shifts:  In: 500 [IV Piggyback:500]  Out: -     Physical Exam  Constitutional:       General: She is not in acute distress.     Appearance: She is obese. She is not  ill-appearing.   HENT:      Head: Normocephalic and atraumatic.      Nose: Nose normal.      Mouth/Throat:      Mouth: Mucous membranes are moist.      Pharynx: Oropharynx is clear.   Eyes:      Conjunctiva/sclera: Conjunctivae normal.      Pupils: Pupils are equal, round, and reactive to light.   Neck:      Musculoskeletal: Normal range of motion and neck supple.   Cardiovascular:      Rate and Rhythm: Normal rate and regular rhythm.   Pulmonary:      Effort: Pulmonary effort is normal.      Breath sounds: Decreased breath sounds present.   Abdominal:      General: Abdomen is flat.      Palpations: Abdomen is soft.   Musculoskeletal: Normal range of motion.   Skin:     General: Skin is warm and dry.      Comments: Gangrenous wound to R foot. RFA AVF with +thrill and +bruit    Neurological:      General: No focal deficit present.      Mental Status: She is alert and oriented to person, place, and time.   Psychiatric:         Mood and Affect: Mood normal.         Behavior: Behavior normal.         Significant Labs:  CBC:   Recent Labs   Lab 09/04/20  0415   WBC 9.07   RBC 3.58*   HGB 10.4*   HCT 34.8*      MCV 97   MCH 29.1   MCHC 29.9*     CMP:   Recent Labs   Lab 09/04/20  0415   *   CALCIUM 9.0   ALBUMIN 3.4*   PROT 7.6   *   K 5.3*   CO2 23   CL 91*   BUN 46*   CREATININE 7.9*   ALKPHOS 59   ALT 15   AST 11   BILITOT 0.4     All labs within the past 24 hours have been reviewed.

## 2020-09-04 NOTE — CONSULTS
[facility]  Nephrology  Consult Note    Patient Name: Kylie King  MRN: [id number]  Admission Date: 9/3/2020  Hospital Length of Stay: 1 days  Attending Provider: Steph Dinero MD   Primary Care Physician: Virginia Foote MD  Principal Problem:Gangrene of right foot    Inpatient consult to Nephrology  Consult performed by: Randy Echavarria NP  Consult ordered by: Min Salcedo MD  Reason for consult: ESRD on HD         Subjective:     HPI: Ms. King is a 63 yo F with T2DM, ESRD on HD (MWF), HFpEF (EF 50-55%), HTN, HLD, COPD, T2DM, chronic venous insufficiency, and anxiety/depression that presented from Podiatry clinic acute worsening R foot wound. She had new gangrenous changes so was sent to ED for further evaluation. Pt is anuric. Pt last dialyzed on 9/2/2020. Pt endorses intermittent SOB and cough with clear sputum over the past few months. Pt denies SOB at this time, as well as fever, CP, N/V/D, abdominal pain, and swelling to her lower extremities. Nephrology has been consulted for management of ESRD on HD.     -HPI was obtained from patient interview, and from review of the patient's EMR.     Past Medical History:   Diagnosis Date    Anxiety     Arthritis     DDD, Lumbar    Breast cancer 1/2013    left breast- invasive mammary carcinoma, ER/TX positive, Her2 negative    Carotid artery stenosis 8/13/2018 6/22/2018: Carotid Duplex: SHANELL: Moderate plaquing - 2.0 m/s - <50%. LICA: Moderate plaquing - 1.6 m/s - <50%.    Cataract     Choledocholithiasis     s/p ERCP and stent placement    Chronic obstructive pulmonary disease 6/8/2018    Chronic venous insufficiency     Colon cancer 2001    CRI (chronic renal insufficiency)     one kidney    Dialysis AV fistula malfunction     ESRD (end stage renal disease) on dialysis     Former smoker 6/8/2018    GERD (gastroesophageal reflux disease)     History of acute pancreatitis 2009 2/09 s/p sphincterotomy    History of  cerebrovascular accident 6/8/2018    History of colon cancer     s/p sigmoid colectomy    HTN (hypertension), benign     Hypercholesterolemia     Hyperlipidemia     Hypoxemia 6/8/2018    Intracerebral hemorrhage     Kidney stone     left    Lymphedema of both lower extremities     Obesity     Pancreatitis     Pancreatitis 2008    Physical deconditioning     Pulmonary edema     Sacroiliac joint pain     Spinal stenosis     Spinal stenosis, lumbar     Stroke 12/2012    Tobacco abuse     Type 2 diabetes mellitus with neurological manifestations, controlled     Neuropathy       Past Surgical History:   Procedure Laterality Date    AV FISTULA PLACEMENT Right 5/28/2018    Procedure: CREATION -FISTULA-AV;  Surgeon: RICK Pollard III, MD;  Location: [facility] OR [address] [place] [address];  Service: Peripheral Vascular;  Laterality: Right;    BREAST BIOPSY  1/2012    left breast invasive mammary carcinoma (lateral bx) and intraductal papilloma (medial bx)    BREAST BIOPSY  1999    rt breast FA    CATARACT EXTRACTION W/  INTRAOCULAR LENS IMPLANT Right 1/24/2019        CATARACT EXTRACTION W/  INTRAOCULAR LENS IMPLANT Left 1/31/2019    Procedure: EXTRACTION, CATARACT, WITH IOL INSERTION;  Surgeon: Immanuel Moncada MD;  Location: [facility];  Service: Ophthalmology;  Laterality: Left;    CHOLECYSTECTOMY  2001    COLON SURGERY      HERNIA REPAIR      left nephrectomy      atrophic kidney and hydronephrosis    left oopherectomy  2001    MASTECTOMY  2013    left    NEPHRECTOMY  2011    lap    REVISION OF ARTERIOVENOUS FISTULA Right 8/15/2018    Procedure: REVISION, AV FISTULA;  Surgeon: RICK Pollard III, MD;  Location: [facility] OR [address] [place] [address];  Service: Peripheral Vascular;  Laterality: Right;    SIGMOIDECTOMY  2001    TOTAL REDUCTION MAMMOPLASTY Right 2013       Review of patient's allergies indicates:   Allergen Reactions    Cyclobenzaprine Hallucinations    Erythromycin      Stomach upset     Current  Facility-Administered Medications   Medication Frequency    0.9%  NaCl infusion Once    acetaminophen tablet 650 mg Q4H PRN    acetaminophen-codeine 300-30mg per tablet 1 tablet Q6H PRN    albuterol-ipratropium 2.5 mg-0.5 mg/3 mL nebulizer solution 3 mL Q6H PRN    amLODIPine tablet 10 mg QAM    atorvastatin tablet 40 mg Daily    carvediloL tablet 12.5 mg BID    citalopram tablet 20 mg Nightly    dextrose 50% injection 12.5 g PRN    dextrose 50% injection 25 g PRN    famotidine tablet 20 mg QHS    gabapentin capsule 400 mg QHS    glucagon (human recombinant) injection 1 mg PRN    glucose chewable tablet 16 g PRN    glucose chewable tablet 24 g PRN    hydrALAZINE tablet 25 mg Daily    insulin aspart U-100 pen 1-10 Units QID (AC + HS) PRN    insulin aspart U-100 pen 4 Units TIDWM    insulin detemir U-100 pen 12 Units QHS    letrozole tablet 2.5 mg Nightly    melatonin tablet 6 mg Nightly PRN    ondansetron injection 4 mg Q8H PRN    promethazine tablet 25 mg Q6H PRN    sodium chloride 0.9% flush 10 mL PRN     Facility-Administered Medications Ordered in Other Encounters   Medication Frequency    mupirocin 2 % ointment On Call Procedure     Family History     Problem Relation (Age of Onset)    Arthritis Mother    Breast cancer Maternal Grandmother    Cancer Maternal Grandmother, Maternal Aunt    Celiac disease Sister    Diabetes Father    Heart disease Mother, Father, Maternal Grandmother        Tobacco Use    Smoking status: Current Some Day Smoker     Packs/day: 0.50     Years: 28.00     Pack years: 14.00     Types: Cigarettes     Start date: 1990     Last attempt to quit: 2018     Years since quittin.6    Smokeless tobacco: Never Used    Tobacco comment: anxious   Substance and Sexual Activity    Alcohol use: No    Drug use: No    Sexual activity: Never     Partners: Male     Review of Systems   Constitutional: Negative.    HENT: Negative.    Eyes: Negative.    Respiratory:  Positive for cough and shortness of breath. Negative for apnea, choking, chest tightness, wheezing and stridor.    Cardiovascular: Negative.    Gastrointestinal: Negative.    Genitourinary: Positive for decreased urine volume (anuric). Negative for flank pain and hematuria.   Musculoskeletal: Negative.    Skin: Positive for wound (R foot).   Neurological: Negative.    Psychiatric/Behavioral: Negative.      Objective:     Vital Signs (Most Recent):  Temp: 98 °F (36.7 °C) (09/04/20 0820)  Pulse: 75 (09/04/20 0845)  Resp: 18 (09/04/20 0820)  BP: (!) 171/71 (09/04/20 0845)  SpO2: 95 % (09/04/20 0802)  O2 Device (Oxygen Therapy): room air (09/04/20 0820) Vital Signs (24h Range):  Temp:  [97.1 °F (36.2 °C)-98.8 °F (37.1 °C)] 98 °F (36.7 °C)  Pulse:  [68-84] 75  Resp:  [16-18] 18  SpO2:  [95 %-99 %] 95 %  BP: (140-202)/() 171/71     Weight: 125 kg (275 lb 9.2 oz) (09/03/20 1447)  Body mass index is 47.3 kg/m².  Body surface area is 2.38 meters squared.    I/O last 3 completed shifts:  In: 500 [IV Piggyback:500]  Out: -     Physical Exam  Constitutional:       General: She is not in acute distress.     Appearance: She is obese. She is not ill-appearing.   HENT:      Head: Normocephalic and atraumatic.      Nose: Nose normal.      Mouth/Throat:      Mouth: Mucous membranes are moist.      Pharynx: Oropharynx is clear.   Eyes:      Conjunctiva/sclera: Conjunctivae normal.      Pupils: Pupils are equal, round, and reactive to light.   Neck:      Musculoskeletal: Normal range of motion and neck supple.   Cardiovascular:      Rate and Rhythm: Normal rate and regular rhythm.   Pulmonary:      Effort: Pulmonary effort is normal.      Breath sounds: Decreased breath sounds present.   Abdominal:      General: Abdomen is flat.      Palpations: Abdomen is soft.   Musculoskeletal: Normal range of motion.   Skin:     General: Skin is warm and dry.      Comments: Gangrenous wound to R foot. RFA AVF with +thrill and +bruit     Neurological:      General: No focal deficit present.      Mental Status: She is alert and oriented to person, place, and time.   Psychiatric:         Mood and Affect: Mood normal.         Behavior: Behavior normal.         Significant Labs:  CBC:   Recent Labs   Lab 09/04/20  0415   WBC 9.07   RBC 3.58*   HGB 10.4*   HCT 34.8*      MCV 97   MCH 29.1   MCHC 29.9*     CMP:   Recent Labs   Lab 09/04/20  0415   *   CALCIUM 9.0   ALBUMIN 3.4*   PROT 7.6   *   K 5.3*   CO2 23   CL 91*   BUN 46*   CREATININE 7.9*   ALKPHOS 59   ALT 15   AST 11   BILITOT 0.4     All labs within the past 24 hours have been reviewed.        Assessment/Plan:     * Gangrene of right foot  -Management per primary team     ESRD (end stage renal disease) on dialysis  ESRD on HD MWF. Will need HD for metabolic clearance and volume management.     Outpatient HD Information:    -Outpatient HD unit: [facility]   -Nephrologist: MD Lyudmila  -HD tx days: MWF  -HD tx time: 4hrs  -Last HD tx: 8/2/2020  -HD access: RFA AVF  -HD modality: iHD  -Residual urine: Anuric  -DW: 124kg     Inpatient HD Plan:    -Patient seen and examined while on HD, tolerating treatment well and without complaints, vitals stable, labs reviewed and baths adjusted, UF goal of 3L as tolerated, keep MAP >65  -Renal diet, if not NPO   -Strict I/O's and daily weights  -Daily renal function panels   -Maintain Hgb >7.0  -Will continue to follow while inpatient           Thank you for your consult. I will follow-up with patient. Please contact us if you have any additional questions.    Randy Echavarria, NP  Nephrology  [facility]

## 2020-09-04 NOTE — NURSING
Pt up to floor from ED , pt VSS. Pt has no complaints no SOB or chest pain. Tele applied to patient, Pt oriented to room, call bell in reach, bed to lowest position and locked with side rails up x2. Pt educated to call for assistance. Orders placed per MD and will review orders and implement as appropriate.

## 2020-09-04 NOTE — CONSULTS
[facility]  Infectious Disease  Consult Note    Patient Name: Kylie King  MRN: [id number]  Admission Date: 9/3/2020  Hospital Length of Stay: 1 days  Attending Physician: Steph Dinero MD  Primary Care Provider: Vigrinia Foote MD     Isolation Status: No active isolations      Inpatient consult to Infectious Diseases  Consult performed by: Terry Spann PA-C  Consult ordered by: Min Salcedo MD      Attempted to see pt x3, and not in room.    Pt is a 65 yo F with T2DM, ESRD on HD (MWF), HFpEF (EF 50-55%), HTN, HLD, COPD, T2DM, chronic venous insufficiency, and anxiety/depression that presented from Podiatry clinic acute worsening R foot wound.  Patient has chronic venous insufficiency and T2DM with poor foot hygiene. Pt stable. Elevated inflammatory markers. No leukocytosis. XR foot showed diffuse soft tissue swelling, healed fracture of R 5th metatarsal. Vasc Sx consulted in ED, recommended HM admission + Podiatry consult, but they anticipate future amputation. Vasc sx ordered ABIs results pending.  MRI results pending    Plan  -Would hold abx at this time as pt stable  -F/u pending vasc sx and Podiatry plans  -F/u imaging results  -If surgical intervention performed, please obtain dirty and clean margin for culture (aerobic, anaerobic, afb and fungal) and path.  -If pt decompensates or after intervention performed, Start on broad spectrum- Vanc and Cefepime  -ID will continue to follow from afar.

## 2020-09-04 NOTE — CONSULTS
Consult received per RN for right foot.    Pt was admitted on 9/3/20 for right foot gangrene. Pt has a PMHx of DM Type 2, HTN, ESRD on dialysis TuThSa via L radiocephalic fistula created by Dr. Pollard in 8/2018. Chart reflects on 8/4 she had her toenails debrided (Pt follows Dr. Robles with outpatient Podiatry). The top of her right foot turned black about one week ago. She recently finished a course of Clindamycin but states that her foot has still been getting worse. She denies any fever or chills.      Upon chart review, Podiatry and Vascular surgery have both been consulted. Per Dr. Mon with Vascular Surgery progress noted from today, pt has lower leg skin changes consistent with chronic venous stasis with her right foot 1st and second digit with evidence of gangrene, mild cellulitis extending up foot no gas. Per MD ABIs with to pressures/PPGs, duplex of RLE have been ordered with pt to remain NPO for possible procedures today by multiple services.    Secure chat sent to Dr. Dinero regarding that wound care services do not appear to be needed at this time, due to pt benefiting more from Podiatry and Vascular Surgery services to help with underlying issues. Wound care to sign off at this time. Please reconsult if further assistance is needed. [id number]

## 2020-09-04 NOTE — HPI
Pt is a 65 yo F with T2DM, ESRD on HD (MWF), HFpEF (EF 50-55%), HTN, HLD, COPD, T2DM, chronic venous insufficiency, and anxiety/depression that presented from Podiatry clinic acute worsening R foot wound.  Patient has chronic venous insufficiency and T2DM with poor foot hygiene. She is followed by Podiatry (Dr. Robles). She had been seen on 8/4 for loose hallux nail with localized swelling and erythema. She underwent debridement and was prescribed clindamycin x 7 days. The wound was stable while on ABx, but wound worsened 2 days after stopping ABx. She was seen on 9/3 at Podiatry clinic. She had new gangrenous changes so was sent to ED for further evaluation.  Pt afebrile, hypertensive (170s/90s). Pulses dopplerable in ED. Elevated inflammatory markers. No leukocytosis. XR foot showed diffuse soft tissue swelling, healed fracture of R 5th metatarsal. Vasc Sx consulted in ED, recommended  admission + Podiatry consult, but they anticipate future amputation. Vasc sx ordered ABIs results pending.

## 2020-09-04 NOTE — ASSESSMENT & PLAN NOTE
Hypertensive on arrival, didn't take BP meds on day of admission  Continue home amlodipine 10mg, coreg 12.5mg BID (has been taking 25mg daily), hydralazine 25mg daily

## 2020-09-04 NOTE — ASSESSMENT & PLAN NOTE
ESRD on HD MWF. Will need HD for metabolic clearance and volume management.     Outpatient HD Information:    -Outpatient HD unit: [facility]   -Nephrologist: MD Lyudmila  -HD tx days: MWF  -HD tx time: 4hrs  -Last HD tx: 8/2/2020  -HD access: RFA AVF  -HD modality: iHD  -Residual urine: Anuric  -DW: 124kg     Inpatient HD Plan:    -Patient seen and examined while on HD, tolerating treatment well and without complaints, vitals stable, labs reviewed and baths adjusted, UF goal of 3L as tolerated, keep MAP >65  -Renal diet, if not NPO   -Strict I/O's and daily weights  -Daily renal function panels   -Maintain Hgb >7.0  -Will continue to follow while inpatient

## 2020-09-04 NOTE — SUBJECTIVE & OBJECTIVE
Medications Prior to Admission   Medication Sig Dispense Refill Last Dose    amLODIPine (NORVASC) 10 MG tablet Take 1 tablet (10 mg total) by mouth every morning. 90 tablet 3 9/3/2020    atorvastatin (LIPITOR) 40 MG tablet Take 1 tablet (40 mg total) by mouth once daily. 90 tablet 3 9/3/2020    carvediloL (COREG) 25 MG tablet Take 1 tablet (25 mg total) by mouth 2 (two) times daily. 180 tablet 3 9/3/2020    citalopram (CELEXA) 20 MG tablet Take 1 tablet (20 mg total) by mouth nightly. 90 tablet 3 9/3/2020    famotidine (PEPCID) 20 MG tablet Take 1 tablet (20 mg total) by mouth nightly as needed. 90 tablet 3 9/3/2020    gabapentin (NEURONTIN) 400 MG capsule Take 1 capsule (400 mg total) by mouth every evening. 90 capsule 3 9/3/2020    hydrALAZINE (APRESOLINE) 25 MG tablet Take 1 tablet (25 mg total) by mouth 2 (two) times daily. 180 tablet 3 9/3/2020    insulin detemir U-100 (LEVEMIR FLEXTOUCH U-100 INSULN) 100 unit/mL (3 mL) InPn pen Inject 45 Units into the skin 2 (two) times daily. 81 mL 3 9/3/2020    insulin lispro (HUMALOG KWIKPEN INSULIN) 100 unit/mL pen BLOOD GLUCOSE 150-200, INJECT 1 UNITS UNDER THE SKIN, 201-250, 2 UNITS, 251-300, 3 UNITS THREE TIMES A DAY AS DIRECTED 1 Box 3 9/3/2020    insulin lispro (HUMALOG KWIKPEN INSULIN) 100 unit/mL pen BLOOD GLUCOSE 150-200, INJECT 15 UNITS UNDER THE SKIN, 201-250, 18 UNITS, 251-300, 20 UNITS THREE TIMES A DAY AS DIRECTED 45 mL 3 9/3/2020    letrozole (FEMARA) 2.5 mg Tab Take 1 tablet (2.5 mg total) by mouth nightly. 90 tablet 3 9/3/2020    acetaminophen (TYLENOL) 325 MG tablet Take 650 mg by mouth every 6 (six) hours as needed for mild pain 1-3/10 pain scale.       acetaminophen-codeine 300-30mg (TYLENOL #3) 300-30 mg Tab TAKE 1 TABLET BY MOUTH EVERY 6 HOURS AS NEEDED 90 tablet 0     albuterol (PROVENTIL/VENTOLIN HFA) 90 mcg/actuation inhaler Inhale 2 puffs into the lungs every 6 (six) hours as needed for Wheezing. Rescue 18 g 3     ammonium lactate  "12 % Crea Apply twice daily to affected parts both feet as needed. 140 g 11     BD INSULIN PEN NEEDLE UF MINI 31 gauge x 3/16" Ndle USE 5 NEEDLES PER  each 2     diclofenac sodium (VOLTAREN) 1 % Gel Apply 2 grams topically 4 (four) times daily. 100 g 3     fish oil-omega-3 fatty acids 300-1,000 mg capsule Take 1 capsule by mouth every morning.       fluticasone (FLONASE) 50 mcg/actuation nasal spray 2 sprays (100 mcg total) by Each Nare route once daily. 1 Bottle 2     insulin detemir U-100 (LEVEMIR) 100 unit/mL injection Inject 45 Units into the skin 2 (two) times daily.       insulin lispro (HUMALOG KWIKPEN INSULIN) 100 unit/mL pen BLOOD GLUCOSE 150-200, INJECT 1 UNITS UNDER THE SKIN, 201-250, 2 UNITS, 251-300, 3 UNITS THREE TIMES A DAY AS DIRECTED       insulin needles, disposable, (PEN NEEDLE, DIABETIC) 31 Ndle Patient needs 5 needles a day 500 each 3     iron polysacch complex-b12-fa 150-25-1 mg-mcg-mg (FERREX 150 FORTE) 150-25-1 mg-mcg-mg Cap Take 150 mg by mouth once daily.       ketoconazole (NIZORAL) 2 % cream Apply topically once daily. 1 Tube 2     lancets (ONETOUCH DELICA LANCETS) 33 gauge Misc 1 lancet by Misc.(Non-Drug; Combo Route) route 4 (four) times daily before meals and nightly. 400 each 4     pen needle, diabetic 32 gauge x 5/32" Ndle 1 Device by Misc.(Non-Drug; Combo Route) route 5 (five) times daily. 450 each 6        Review of patient's allergies indicates:   Allergen Reactions    Cyclobenzaprine Hallucinations    Erythromycin      Stomach upset       Past Medical History:   Diagnosis Date    Anxiety     Arthritis     DDD, Lumbar    Breast cancer 1/2013    left breast- invasive mammary carcinoma, ER/HI positive, Her2 negative    Carotid artery stenosis 8/13/2018 6/22/2018: Carotid Duplex: SHANELL: Moderate plaquing - 2.0 m/s - <50%. LICA: Moderate plaquing - 1.6 m/s - <50%.    Cataract     Choledocholithiasis     s/p ERCP and stent placement    Chronic obstructive " pulmonary disease 6/8/2018    Chronic venous insufficiency     Colon cancer 2001    CRI (chronic renal insufficiency)     one kidney    Dialysis AV fistula malfunction     ESRD (end stage renal disease) on dialysis     Former smoker 6/8/2018    GERD (gastroesophageal reflux disease)     History of acute pancreatitis 2009 2/09 s/p sphincterotomy    History of cerebrovascular accident 6/8/2018    History of colon cancer     s/p sigmoid colectomy    HTN (hypertension), benign     Hypercholesterolemia     Hyperlipidemia     Hypoxemia 6/8/2018    Intracerebral hemorrhage     Kidney stone     left    Lymphedema of both lower extremities     Obesity     Pancreatitis     Pancreatitis 2008    Physical deconditioning     Pulmonary edema     Sacroiliac joint pain     Spinal stenosis     Spinal stenosis, lumbar     Stroke 12/2012    Tobacco abuse     Type 2 diabetes mellitus with neurological manifestations, controlled     Neuropathy     Past Surgical History:   Procedure Laterality Date    AV FISTULA PLACEMENT Right 5/28/2018    Procedure: CREATION -FISTULA-AV;  Surgeon: RICK Pollard III, MD;  Location: [facility] OR [address] [place] [address];  Service: Peripheral Vascular;  Laterality: Right;    BREAST BIOPSY  1/2012    left breast invasive mammary carcinoma (lateral bx) and intraductal papilloma (medial bx)    BREAST BIOPSY  1999    rt breast FA    CATARACT EXTRACTION W/  INTRAOCULAR LENS IMPLANT Right 1/24/2019        CATARACT EXTRACTION W/  INTRAOCULAR LENS IMPLANT Left 1/31/2019    Procedure: EXTRACTION, CATARACT, WITH IOL INSERTION;  Surgeon: Immanuel Moncada MD;  Location: [facility];  Service: Ophthalmology;  Laterality: Left;    CHOLECYSTECTOMY  2001    COLON SURGERY      HERNIA REPAIR      left nephrectomy      atrophic kidney and hydronephrosis    left oopherectomy  2001    MASTECTOMY  2013    left    NEPHRECTOMY  2011    lap    REVISION OF ARTERIOVENOUS FISTULA Right 8/15/2018    Procedure:  REVISION, AV FISTULA;  Surgeon: RICK Pollard III, MD;  Location: [facility] OR 2ND FLR;  Service: Peripheral Vascular;  Laterality: Right;    SIGMOIDECTOMY      TOTAL REDUCTION MAMMOPLASTY Right 2013     Family History     Problem Relation (Age of Onset)    Arthritis Mother    Breast cancer Maternal Grandmother    Cancer Maternal Grandmother, Maternal Aunt    Celiac disease Sister    Diabetes Father    Heart disease Mother, Father, Maternal Grandmother        Tobacco Use    Smoking status: Current Some Day Smoker     Packs/day: 0.50     Years: 28.00     Pack years: 14.00     Types: Cigarettes     Start date: 1990     Last attempt to quit: 2018     Years since quittin.6    Smokeless tobacco: Never Used    Tobacco comment: anxious   Substance and Sexual Activity    Alcohol use: No    Drug use: No    Sexual activity: Never     Partners: Male     Review of Systems   Constitutional: Negative for activity change, chills and fever.   HENT: Negative for congestion, rhinorrhea, sinus pain and sore throat.    Respiratory: Negative for cough, chest tightness, shortness of breath and wheezing.    Cardiovascular: Negative for chest pain, palpitations and leg swelling.   Gastrointestinal: Negative for abdominal pain.   Musculoskeletal: Positive for joint swelling. Negative for myalgias.   Skin: Negative for color change and wound.   Neurological: Negative for dizziness, speech difficulty and numbness.     Objective:     Vital Signs (Most Recent):  Temp: 97.9 °F (36.6 °C) (20 0503)  Pulse: 68 (20 0600)  Resp: 16 (20 0503)  BP: (!) 158/62 (20 0518)  SpO2: 95 % (20 0503) Vital Signs (24h Range):  Temp:  [97.8 °F (36.6 °C)-98.8 °F (37.1 °C)] 97.9 °F (36.6 °C)  Pulse:  [68-84] 68  Resp:  [16-18] 16  SpO2:  [95 %-99 %] 95 %  BP: (140-177)/(62-93) 158/62     Weight: 125 kg (275 lb 9.2 oz)  Body mass index is 47.3 kg/m².    Physical Exam  Constitutional:       Appearance: She is obese.    HENT:      Head: Normocephalic.      Mouth/Throat:      Mouth: Mucous membranes are moist.   Eyes:      Pupils: Pupils are equal, round, and reactive to light.   Cardiovascular:      Rate and Rhythm: Normal rate and regular rhythm.      Comments: She has a palpable left PT pulse.  No other palpable distal pulses.  She has palpable bilateral femoral pulses  Pulmonary:      Effort: Pulmonary effort is normal. No respiratory distress.   Abdominal:      General: Abdomen is flat. There is no distension.      Palpations: Abdomen is soft.      Tenderness: There is no abdominal tenderness.      Comments: Well healed lower midline scar   Musculoskeletal:      Comments: Bilateral lower leg skin changes consistent with chronic venous stasis.  Right foot 1st and second digit with evidence of gangrene, mild cellulitis extending up foot, no gas. Motor sensory intact.  RUE AVG with good thrill   Neurological:      Mental Status: She is alert and oriented to person, place, and time.         Significant Labs:  BMP:   Recent Labs   Lab 09/04/20  0415   *   *   K 5.3*   CL 91*   CO2 23   BUN 46*   CREATININE 7.9*   CALCIUM 9.0   MG 1.9     CBC:   Recent Labs   Lab 09/04/20  0415   WBC 9.07   RBC 3.58*   HGB 10.4*   HCT 34.8*      MCV 97   MCH 29.1   MCHC 29.9*       Significant Diagnostics:  I have reviewed all pertinent imaging results/findings within the past 24 hours.

## 2020-09-04 NOTE — HPI
Ms. King is a 65 yo F with T2DM, ESRD on HD (MWF), HFpEF (EF 50-55%), HTN, HLD, COPD, T2DM, chronic venous insufficiency, and anxiety/depression that presented from Podiatry clinic acute worsening R foot wound. She had new gangrenous changes so was sent to ED for further evaluation. Pt is anuric. Pt last dialyzed on 9/2/2020. Pt endorses intermittent SOB and cough with clear sputum over the past few months. Pt denies SOB at this time, as well as fever, CP, N/V/D, abdominal pain, and swelling to her lower extremities. Nephrology has been consulted for management of ESRD on HD.     -HPI was obtained from patient interview, and from review of the patient's EMR.

## 2020-09-04 NOTE — PT/OT/SLP PROGRESS
Physical Therapy      Patient Name:  Kylie King   MRN:  [id number]    Patient not seen today secondary to Dialysis. Pt JESSICA for dialysis. PT unable to return for later attempt. Will follow-up at next scheduled session as able.    Ana Kuo, PT, DPT   9/4/2020  [phone]

## 2020-09-04 NOTE — PLAN OF CARE
CM attempted to contact the pt for DC needs assessment. Off the floor at . CM will try again later.

## 2020-09-04 NOTE — ASSESSMENT & PLAN NOTE
Pt is a 65 yo F with T2DM, ESRD on HD (MWF), HFpEF (EF 50-55%), HTN, HLD, COPD, T2DM, chronic venous insufficiency, and anxiety/depression that presented from Podiatry clinic acute worsening R foot wound.  Patient has chronic venous insufficiency and T2DM with poor foot hygiene. Pt stable. Elevated inflammatory markers. No leukocytosis. XR foot showed diffuse soft tissue swelling, healed fracture of R 5th metatarsal. Vasc Sx consulted in ED, recommended  admission + Podiatry consult, but they anticipate future amputation. Vasc sx ordered ABIs results pending.  MRI results pending    Plan  -Would hold abx at this time as pt stable  -F/u pending vasc sx and Podiatry plans  -F/u imaging results  -If surgical intervention performed, please obtain dirty and clean margin for culture (aerobic, anaerobic, afb and fungal) and path.  -If pt decompensates or after intervention performed, Start on broad spectrum- Vanc and Cefepime  -ID will continue to follow from afar.

## 2020-09-04 NOTE — H&P
[facility] Hospital Medicine  History & Physical    Patient Name: Kylie King  MRN: [id number]  Admission Date: 9/3/2020  Attending Physician: Steph Dinero MD   Primary Care Provider: Virginia Foote MD    [facility] Medicine Team: [facility] 1 Min Salcedo MD     Patient information was obtained from patient and ER records.     Subjective:     Principal Problem:Gangrene of right foot    Chief Complaint:   Chief Complaint   Patient presents with    Wound Check     Patient sent to ER for further evaluation of right foot/toes wound.         HPI: Ms. King is a 63 yo F with T2DM, ESRD on HD (MWF), HFpEF (EF 50-55%), HTN, HLD, COPD, T2DM, chronic venous insufficiency, and anxiety/depression that presents from Podiatry clinic acute worsening R foot wound.    Patient has chronic venous insufficiency and T2DM with poor foot hygiene. She is followed by Podiatry (Dr. Robles). She had been seen on 8/4 for loose hallux nail with localized swelling and erythema. She underwent debridement and was prescribed clindamycin x 7 days. The wound was stable while on ABx, but wound worsened 2 days after stopping ABx. She was seen on 9/3 at Podiatry clinic. She had new gangrenous changes so was sent to ED for further evaluation.    ED Course:  Afebrile, hypertensive (170s/90s). Pulses dopplerable in ED. Labs notable for mild hypoNa, elevated BUN + Cr, hyperglycemia stable normocytic anemia, and elevated inflammatory markers. No leukocytosis. XR foot showed diffuse soft tissue swelling, healed fracture of R 5th metatarsal. Vasc Sx consulted in ED, recommended  admission + Podiatry consult, but they anticipate future amputation. Full Vasc Sx consult to follow.     Past Medical History:   Diagnosis Date    Anxiety     Arthritis     DDD, Lumbar    Breast cancer 1/2013    left breast- invasive mammary carcinoma, ER/[place] positive, Her2 negative    Carotid artery stenosis 8/13/2018 6/22/2018: Carotid Duplex:  SHANELL: Moderate plaquing - 2.0 m/s - <50%. LICA: Moderate plaquing - 1.6 m/s - <50%.    Cataract     Choledocholithiasis     s/p ERCP and stent placement    Chronic obstructive pulmonary disease 6/8/2018    Chronic venous insufficiency     Colon cancer 2001    CRI (chronic renal insufficiency)     one kidney    Dialysis AV fistula malfunction     ESRD (end stage renal disease) on dialysis     Former smoker 6/8/2018    GERD (gastroesophageal reflux disease)     History of acute pancreatitis 2009 2/09 s/p sphincterotomy    History of cerebrovascular accident 6/8/2018    History of colon cancer     s/p sigmoid colectomy    HTN (hypertension), benign     Hypercholesterolemia     Hyperlipidemia     Hypoxemia 6/8/2018    Intracerebral hemorrhage     Kidney stone     left    Lymphedema of both lower extremities     Obesity     Pancreatitis     Pancreatitis 2008    Physical deconditioning     Pulmonary edema     Sacroiliac joint pain     Spinal stenosis     Spinal stenosis, lumbar     Stroke 12/2012    Tobacco abuse     Type 2 diabetes mellitus with neurological manifestations, controlled     Neuropathy       Past Surgical History:   Procedure Laterality Date    AV FISTULA PLACEMENT Right 5/28/2018    Procedure: CREATION -FISTULA-AV;  Surgeon: RICK Pollard III, MD;  Location: [address];  Service: Peripheral Vascular;  Laterality: Right;    BREAST BIOPSY  1/2012    left breast invasive mammary carcinoma (lateral bx) and intraductal papilloma (medial bx)    BREAST BIOPSY  1999    rt breast FA    CATARACT EXTRACTION W/  INTRAOCULAR LENS IMPLANT Right 1/24/2019        CATARACT EXTRACTION W/  INTRAOCULAR LENS IMPLANT Left 1/31/2019    Procedure: EXTRACTION, CATARACT, WITH IOL INSERTION;  Surgeon: Immanuel Moncada MD;  Location: [facility];  Service: Ophthalmology;  Laterality: Left;    CHOLECYSTECTOMY  2001    COLON SURGERY      HERNIA REPAIR      left nephrectomy       atrophic kidney and hydronephrosis    left oopherectomy  2001    MASTECTOMY  2013    left    NEPHRECTOMY  2011    lap    REVISION OF ARTERIOVENOUS FISTULA Right 8/15/2018    Procedure: REVISION, AV FISTULA;  Surgeon: RICK Pollard III, MD;  Location: [facility] OR [address] [place] [address];  Service: Peripheral Vascular;  Laterality: Right;    SIGMOIDECTOMY  2001    TOTAL REDUCTION MAMMOPLASTY Right 2013       Review of patient's allergies indicates:   Allergen Reactions    Cyclobenzaprine Hallucinations    Erythromycin      Stomach upset       Current Facility-Administered Medications on File Prior to Encounter   Medication    mupirocin 2 % ointment     Current Outpatient Medications on File Prior to Encounter   Medication Sig    amLODIPine (NORVASC) 10 MG tablet Take 1 tablet (10 mg total) by mouth every morning.    atorvastatin (LIPITOR) 40 MG tablet Take 1 tablet (40 mg total) by mouth once daily.    carvediloL (COREG) 25 MG tablet Take 1 tablet (25 mg total) by mouth 2 (two) times daily.    citalopram (CELEXA) 20 MG tablet Take 1 tablet (20 mg total) by mouth nightly.    famotidine (PEPCID) 20 MG tablet Take 1 tablet (20 mg total) by mouth nightly as needed.    gabapentin (NEURONTIN) 400 MG capsule Take 1 capsule (400 mg total) by mouth every evening.    hydrALAZINE (APRESOLINE) 25 MG tablet Take 1 tablet (25 mg total) by mouth 2 (two) times daily.    insulin detemir U-100 (LEVEMIR FLEXTOUCH U-100 INSULN) 100 unit/mL (3 mL) InPn pen Inject 45 Units into the skin 2 (two) times daily.    insulin lispro (HUMALOG KWIKPEN INSULIN) 100 unit/mL pen BLOOD GLUCOSE 150-200, INJECT 1 UNITS UNDER THE SKIN, 201-250, 2 UNITS, 251-300, 3 UNITS THREE TIMES A DAY AS DIRECTED    insulin lispro (HUMALOG KWIKPEN INSULIN) 100 unit/mL pen BLOOD GLUCOSE 150-200, INJECT 15 UNITS UNDER THE SKIN, 201-250, 18 UNITS, 251-300, 20 UNITS THREE TIMES A DAY AS DIRECTED    letrozole (FEMARA) 2.5 mg Tab Take 1 tablet (2.5 mg total) by mouth  "nightly.    acetaminophen (TYLENOL) 325 MG tablet Take 650 mg by mouth every 6 (six) hours as needed for mild pain 1-3/10 pain scale.    acetaminophen-codeine 300-30mg (TYLENOL #3) 300-30 mg Tab TAKE 1 TABLET BY MOUTH EVERY 6 HOURS AS NEEDED    albuterol (PROVENTIL/VENTOLIN HFA) 90 mcg/actuation inhaler Inhale 2 puffs into the lungs every 6 (six) hours as needed for Wheezing. Rescue    ammonium lactate 12 % Crea Apply twice daily to affected parts both feet as needed.    BD INSULIN PEN NEEDLE UF MINI 31 gauge x 3/16" Ndle USE 5 NEEDLES PER DAY    diclofenac sodium (VOLTAREN) 1 % Gel Apply 2 grams topically 4 (four) times daily.    fish oil-omega-3 fatty acids 300-1,000 mg capsule Take 1 capsule by mouth every morning.    fluticasone (FLONASE) 50 mcg/actuation nasal spray 2 sprays (100 mcg total) by Each Nare route once daily.    insulin detemir U-100 (LEVEMIR) 100 unit/mL injection Inject 45 Units into the skin 2 (two) times daily.    insulin lispro (HUMALOG KWIKPEN INSULIN) 100 unit/mL pen BLOOD GLUCOSE 150-200, INJECT 1 UNITS UNDER THE SKIN, 201-250, 2 UNITS, 251-300, 3 UNITS THREE TIMES A DAY AS DIRECTED    insulin needles, disposable, (PEN NEEDLE, DIABETIC) 31 Ndle Patient needs 5 needles a day    iron polysacch complex-b12-fa 150-25-1 mg-mcg-mg (FERREX 150 FORTE) 150-25-1 mg-mcg-mg Cap Take 150 mg by mouth once daily.    ketoconazole (NIZORAL) 2 % cream Apply topically once daily.    lancets (ONETOUCH DELICA LANCETS) 33 gauge Misc 1 lancet by Misc.(Non-Drug; Combo Route) route 4 (four) times daily before meals and nightly.    pen needle, diabetic 32 gauge x 5/32" Ndle 1 Device by Misc.(Non-Drug; Combo Route) route 5 (five) times daily.     Family History     Problem Relation (Age of Onset)    Arthritis Mother    Breast cancer Maternal Grandmother    Cancer Maternal Grandmother, Maternal Aunt    Celiac disease Sister    Diabetes Father    Heart disease Mother, Father, Maternal Grandmother    "     Tobacco Use    Smoking status: Current Some Day Smoker     Packs/day: 0.50     Years: 28.00     Pack years: 14.00     Types: Cigarettes     Start date: 1990     Last attempt to quit: 2018     Years since quittin.6    Smokeless tobacco: Never Used    Tobacco comment: anxious   Substance and Sexual Activity    Alcohol use: No    Drug use: No    Sexual activity: Never     Partners: Male     Review of Systems   Constitutional: Negative for fever and unexpected weight change.   HENT: Negative for mouth sores and trouble swallowing.         No dry eyes, dry mouth, or swollen glands. No hair loss.   Eyes: Negative for pain.   Respiratory: Positive for cough (chronic, non-productive). Negative for shortness of breath.    Cardiovascular: Negative for chest pain.   Gastrointestinal: Negative for abdominal pain, blood in stool, constipation, diarrhea, nausea and vomiting.   Genitourinary: Negative for dysuria and genital sores.   Musculoskeletal: Negative for arthralgias and joint swelling.   Skin: Positive for wound. Negative for color change and rash.   Neurological: Negative for numbness and headaches.   Hematological: Does not bruise/bleed easily.     Objective:     Vital Signs (Most Recent):  Temp: 98.8 °F (37.1 °C) (20 1447)  Pulse: 84 (20 1447)  Resp: 18 (20 1759)  BP: (!) 176/93 (20 1447)  SpO2: 95 % (20 1447) Vital Signs (24h Range):  Temp:  [98.8 °F (37.1 °C)] 98.8 °F (37.1 °C)  Pulse:  [84] 84  Resp:  [17-18] 18  SpO2:  [95 %] 95 %  BP: (176)/(93) 176/93     Weight: 125 kg (275 lb 9.2 oz)  Body mass index is 47.3 kg/m².    Physical Exam  Constitutional:       General: She is not in acute distress.     Appearance: She is well-developed. She is obese.   HENT:      Head: Normocephalic and atraumatic.      Mouth/Throat:      Pharynx: No oropharyngeal exudate.   Eyes:      Conjunctiva/sclera: Conjunctivae normal.      Pupils: Pupils are equal, round, and reactive to  light.   Neck:      Musculoskeletal: Normal range of motion and neck supple.   Cardiovascular:      Rate and Rhythm: Normal rate and regular rhythm.      Heart sounds: Normal heart sounds. No murmur.      Comments: BL dopplerable pulses  Pulmonary:      Effort: Pulmonary effort is normal. No respiratory distress.      Breath sounds: Normal breath sounds. No wheezing or rales.   Abdominal:      General: Bowel sounds are normal. There is no distension.      Palpations: Abdomen is soft.      Tenderness: There is no abdominal tenderness. There is no guarding.   Musculoskeletal:         General: No tenderness.   Skin:     General: Skin is warm and dry.      Findings: Lesion (R foot wound (see images below)) present. No rash.   Neurological:      Mental Status: She is alert and oriented to person, place, and time.      Cranial Nerves: No cranial nerve deficit.      Motor: No abnormal muscle tone.   Psychiatric:         Behavior: Behavior normal.                                Significant Labs:   CBC:   Recent Labs   Lab 09/03/20  1619   WBC 8.86   HGB 10.5*   HCT 33.6*        CMP:   Recent Labs   Lab 09/03/20  1619   *   K 4.9   CL 92*   CO2 24   *   BUN 38*   CREATININE 7.1*   CALCIUM 9.4   PROT 7.9   ALBUMIN 3.5   BILITOT 0.4   ALKPHOS 66   AST 10   ALT 18   ANIONGAP 14   EGFRNONAA 5.5*     All pertinent labs within the past 24 hours have been reviewed.    Significant Imaging: I have reviewed and interpreted all pertinent imaging results/findings within the past 24 hours.    Assessment/Plan:     * Gangrene of right foot  65 yo F with gangrene of R 1st hallux. HD stable. Afebrile, no leukocytosis. XR foot showed diffuse soft tissue swelling, healed fracture of R 5th metatarsal. Received vanc in ED. Vasc Sx consulted in ED, recommended HM admission + Podiatry consult, but they anticipate future amputation.    Plan:  - Vasc Sx, Podiatry, and ID consulted, recs appreciated  - Given clinical stability,  will hold off on ABx to increase Cx yield  - BCx ordered    ESRD (end stage renal disease) on dialysis  Started on HD in 4/2019  MWF schedule, last HD 9/2  Access via R radial AV fistula    Plan:  - Nephrology consulted for inpatient HD  - CMPs daily    Tobacco use  Less that 0.5ppd  Smoking cessation counseling  Nicotine patch offered (declined)    Depression  Continue home citalopram 20mg    Chronic obstructive pulmonary disease  Duo-nebs q6hr PRN  Smoking cessation counseling    History of colon cancer  S/p partial colectomy  No previous chemo or XRT    Heart failure, diastolic, chronic  TTE (2018) showed EF 50-55%, moderate dd  Stable, will continue to monitor    Debility  PT/OT consulted, recs appreciated    Morbid obesity  Encouraged weight loss  Dose medications accordingly    Lumbar stenosis  Continue home gabapentin 400mg qhs    History of breast cancer  S/p mastectomy + XRT  No previous chemo    Type 2 diabetes mellitus  Lab Results   Component Value Date    HGBA1C 9.9 (H) 03/05/2020     Home meds: levemir 45U BID, Novolog 15U TIDWM    Plan:  - HbA1c ordered  - Detemir 12U qhs  - Aspart 4 TIDWM  - MDSS  - POCT glucose ACHS  - Diabetic diet    Hypercholesterolemia  Continue home atorvastatin 40mg    Essential hypertension  Hypertensive on arrival, didn't take BP meds on day of admission  Continue home amlodipine 10mg, coreg 12.5mg BID (has been taking 25mg daily), hydralazine 25mg daily    VTE Risk Mitigation (From admission, onward)         Ordered     Reason for No Pharmacological VTE Prophylaxis  Once     Question:  Reasons:  Answer:  Physician Provided (leave comment)  Comment:  potential procedure    09/03/20 2041     IP VTE HIGH RISK PATIENT  Once      09/03/20 2041     Place sequential compression device  Until discontinued      09/03/20 1908                   Min Salcedo MD  Department of Hospital Medicine   [facility]

## 2020-09-04 NOTE — PLAN OF CARE
Pt AAO and VSS. Pt NPO @ 0001 9/4 for possible surgeries/procedures. Podiatry consult and wound care consult placed for R foot wound. Pt educated on fall risk overnight,pt remained free from falls/trauma/injury. Denies chest pain, SOB, palpitations, dizziness, pain, or discomfort. Plan of care reviewed with pt, all questions answered. Bed locked in lowest position, call bell within reach, no acute distress noted, will continue to monitor.

## 2020-09-04 NOTE — ASSESSMENT & PLAN NOTE
A: 63 yo F with R 1st digit dry gangrene and skin and soft tissue infection. Hypertensive but otherwise vitals are stable. WBCs WNL, ESR and CRP elevated. Questionalble circulatory status based on necrotic tissue, diminished pulses, skin changes.     P:   -Continue IV antibiotics.   -Followup on vascular studies and vascular surgery recommendations.  -MRI ordered to assess extent of infection.   -Further planning will be based in part on results of vascular studies and MRI.   -Will continue to follow.

## 2020-09-04 NOTE — PROGRESS NOTES
HD Tx ended. 2.6L removed during 3.5HR Tx. Maribel well. Blood returned via JAY AVF. 15g needles removed x2. Gauze and tape applied. pressure held 5mins, hemostasis achieved.

## 2020-09-04 NOTE — CONSULTS
[facility]  Podiatry  Consult Note    Patient Name: Kylie King  MRN: [id number]  Admission Date: 9/3/2020  Hospital Length of Stay: 1 days  Attending Physician: Steph Dinero MD  Primary Care Provider: Virginia Foote MD     Inpatient consult to Podiatry  Consult performed by: Willie Wong MD  Consult ordered by: Min Salcedo MD        Subjective:     History of Present Illness:  65 yo F with Hx of DM2, ESRD on HD, HFpEF, HTN, HLD, chronic venous insufficiency sent to ED by Dr Robles for infection of the R 1st digit. She previously had removal of a loose L 1st digit nail and prescription of PO clindamycin for surrounding infection by Dr Robles along with debridement of all nails. Within the past week her R foot started turning black, primarily around the 1st and 2nd digits. Patient was seen in podiatry clinic 09/03 for followup and was sent to the ED. In the ED pulses were dopplerable, X ray negative for signs of bone infection, vascular surgery was consulted.     Scheduled Meds:   amLODIPine  10 mg Oral QAM    atorvastatin  40 mg Oral Daily    carvediloL  25 mg Oral BID WM    citalopram  20 mg Oral Nightly    famotidine  20 mg Oral QHS    gabapentin  400 mg Oral QHS    hydrALAZINE  25 mg Oral Q12H    insulin aspart U-100  4 Units Subcutaneous TIDWM    insulin detemir U-100  12 Units Subcutaneous QHS    letrozole  2.5 mg Oral Nightly     Continuous Infusions:  PRN Meds:acetaminophen, acetaminophen-codeine 300-30mg, albuterol-ipratropium, dextrose 50%, dextrose 50%, glucagon (human recombinant), glucose, glucose, insulin aspart U-100, melatonin, ondansetron, promethazine, sodium chloride 0.9%    Review of patient's allergies indicates:   Allergen Reactions    Cyclobenzaprine Hallucinations    Erythromycin      Stomach upset        Past Medical History:   Diagnosis Date    Anxiety     Arthritis     DDD, Lumbar    Breast cancer 1/2013    left breast- invasive mammary  carcinoma, ER/[place] positive, Her2 negative    Carotid artery stenosis 8/13/2018 6/22/2018: Carotid Duplex: SHANELL: Moderate plaquing - 2.0 m/s - <50%. LICA: Moderate plaquing - 1.6 m/s - <50%.    Cataract     Choledocholithiasis     s/p ERCP and stent placement    Chronic obstructive pulmonary disease 6/8/2018    Chronic venous insufficiency     Colon cancer 2001    CRI (chronic renal insufficiency)     one kidney    Dialysis AV fistula malfunction     ESRD (end stage renal disease) on dialysis     Former smoker 6/8/2018    GERD (gastroesophageal reflux disease)     History of acute pancreatitis 2009 2/09 s/p sphincterotomy    History of cerebrovascular accident 6/8/2018    History of colon cancer     s/p sigmoid colectomy    HTN (hypertension), benign     Hypercholesterolemia     Hyperlipidemia     Hypoxemia 6/8/2018    Intracerebral hemorrhage     Kidney stone     left    Lymphedema of both lower extremities     Obesity     Pancreatitis     Pancreatitis 2008    Physical deconditioning     Pulmonary edema     Sacroiliac joint pain     Spinal stenosis     Spinal stenosis, lumbar     Stroke 12/2012    Tobacco abuse     Type 2 diabetes mellitus with neurological manifestations, controlled     Neuropathy     Past Surgical History:   Procedure Laterality Date    AV FISTULA PLACEMENT Right 5/28/2018    Procedure: CREATION -FISTULA-AV;  Surgeon: RICK Pollard III, MD;  Location: [address];  Service: Peripheral Vascular;  Laterality: Right;    BREAST BIOPSY  1/2012    left breast invasive mammary carcinoma (lateral bx) and intraductal papilloma (medial bx)    BREAST BIOPSY  1999    rt breast FA    CATARACT EXTRACTION W/  INTRAOCULAR LENS IMPLANT Right 1/24/2019        CATARACT EXTRACTION W/  INTRAOCULAR LENS IMPLANT Left 1/31/2019    Procedure: EXTRACTION, CATARACT, WITH IOL INSERTION;  Surgeon: Immanuel Moncada MD;  [facility];  Service: Ophthalmology;   Laterality: Left;    CHOLECYSTECTOMY      COLON SURGERY      HERNIA REPAIR      left nephrectomy      atrophic kidney and hydronephrosis    left oopherectomy  2001    MASTECTOMY  2013    left    NEPHRECTOMY  2011    lap    REVISION OF ARTERIOVENOUS FISTULA Right 8/15/2018    Procedure: REVISION, AV FISTULA;  Surgeon: RICK Pollard III, MD;  Location: [facility] OR [address] [place] [address];  Service: Peripheral Vascular;  Laterality: Right;    SIGMOIDECTOMY      TOTAL REDUCTION MAMMOPLASTY Right 2013       Family History     Problem Relation (Age of Onset)    Arthritis Mother    Breast cancer Maternal Grandmother    Cancer Maternal Grandmother, Maternal Aunt    Celiac disease Sister    Diabetes Father    Heart disease Mother, Father, Maternal Grandmother        Tobacco Use    Smoking status: Current Some Day Smoker     Packs/day: 0.50     Years: 28.00     Pack years: 14.00     Types: Cigarettes     Start date: 1990     Last attempt to quit: 2018     Years since quittin.6    Smokeless tobacco: Never Used    Tobacco comment: anxious   Substance and Sexual Activity    Alcohol use: No    Drug use: No    Sexual activity: Never     Partners: Male     Review of Systems   Constitutional: Negative for activity change, chills and fever.   HENT: Negative for congestion, rhinorrhea, sinus pain and sore throat.    Respiratory: Negative for cough, chest tightness, shortness of breath and wheezing.    Cardiovascular: Negative for chest pain, palpitations and leg swelling.   Gastrointestinal: Negative for abdominal pain.   Musculoskeletal: Positive for joint swelling. Negative for myalgias.   Skin: Positive for color change and wound.   Neurological: Positive for numbness. Negative for dizziness and speech difficulty.     Objective:     Vital Signs (Most Recent):  Temp: 98 °F (36.7 °C) (20 1205)  Pulse: 64 (20 1205)  Resp: 18 (20 1205)  BP: (!) 149/74 (20 1205)  SpO2: 95 % (20 0802) Vital  Signs (24h Range):  Temp:  [97.1 °F (36.2 °C)-98 °F (36.7 °C)] 98 °F (36.7 °C)  Pulse:  [61-80] 64  Resp:  [16-18] 18  SpO2:  [95 %-99 %] 95 %  BP: (125-202)/() 149/74     Weight: 125 kg (275 lb 9.2 oz)  Body mass index is 47.3 kg/m².    Foot Exam    General  Orientation: alert and oriented to person, place, and time   Affect: appropriate       Right Foot/Ankle     Inspection and Palpation  Ecchymosis: none  Tenderness: great toe interphalangeal joint and great toe metatarsophalangeal joint   Swelling: great toe interphalangeal joint and great toe metatarsophalangeal joint   Skin Exam: dry skin, skin changes and abnormal color;     Neurovascular  Dorsalis pedis: 1+  Posterior tibial: 1+  Saphenous nerve sensation: diminished  Tibial nerve sensation: diminished  Superficial peroneal nerve sensation: diminished  Deep peroneal nerve sensation: diminished  Sural nerve sensation: diminished      Left Foot/Ankle      Inspection and Palpation  Ecchymosis: none  Tenderness: none   Swelling: none   Skin Exam: dry skin and skin changes;     Neurovascular  Dorsalis pedis: 1+  Posterior tibial: 1+  Saphenous nerve sensation: diminished  Tibial nerve sensation: diminished  Superficial peroneal nerve sensation: diminished  Deep peroneal nerve sensation: diminished  Sural nerve sensation: diminished            Laboratory:  CBC:   Recent Labs   Lab 09/04/20  0415   WBC 9.07   RBC 3.58*   HGB 10.4*   HCT 34.8*      MCV 97   MCH 29.1   MCHC 29.9*     CMP:   Recent Labs   Lab 09/04/20  0415   *   CALCIUM 9.0   ALBUMIN 3.4*   PROT 7.6   *   K 5.3*   CO2 23   CL 91*   BUN 46*   CREATININE 7.9*   ALKPHOS 59   ALT 15   AST 11   BILITOT 0.4     CRP:   Recent Labs   Lab 09/03/20  1619   CRP 49.2*     ESR:   Recent Labs   Lab 09/03/20  1619   SEDRATE 82*     Wound Cultures: No results for input(s): LABAERO in the last 4320 hours.  Microbiology Results (last 7 days)     Procedure Component Value Units Date/Time     Blood culture (site 1) [[id number]] Collected: 09/03/20 2243    Order Status: Completed Specimen: Blood Updated: 09/04/20 0715     Blood Culture, Routine No Growth to date    Narrative:      Site # 1, aerobic and anaerobic    Blood culture (site 2) [[id number]] Collected: 09/03/20 2243    Order Status: Completed Specimen: Blood Updated: 09/04/20 0715     Blood Culture, Routine No Growth to date    Narrative:      Site # 2, aerobic only        Specimen (12h ago, onward)    None          Diagnostic Results  X Ray 9/3: Healed fracture of the right 5th metatarsal. Diffuse soft tissue swelling.  Additional evaluation, as clinically warranted.     Clinical Findings:    Necrosis of the 1st digit to the level of the IPJ, erythema and erosions of the skin dorsally at the 1st digit from the level of the 1st IPJ to MTPJ, diffuse scaling of the dorsal right forefoot. Generalized edema of the right foot, 1+ pitting edema dorsally. No drainage, no probing to deeper layers.               Assessment/Plan:     * Gangrene of right foot  A: 65 yo F with R 1st digit dry gangrene and skin and soft tissue infection. Hypertensive but otherwise vitals are stable. WBCs WNL, ESR and CRP elevated. Questionalble circulatory status based on necrotic tissue, diminished pulses, skin changes.     P:   -Continue IV antibiotics.   -Followup on vascular studies and vascular surgery recommendations.  -MRI ordered to assess extent of infection.   -Further planning will be based in part on results of vascular studies and MRI.   -Will continue to follow.         Thank you for your consult. I will follow-up with patient. Please contact us if you have any additional questions.    Willie Wong DPM PGY-1  Podiatric Medicine & Surgery  [facility]

## 2020-09-04 NOTE — NURSING
"Patient came to floor from dialysis and CTA of BLE. Patient stated she was starving and going to physically get food by herself. Patient took off telemetry box and wheeled downstairs to the first floor to "get something to eat, a bag of chips or something". She stated she was not going to wait on dietary to bring food and that she didn't want anything from the C[facility] floor. RN staff advised against going alone at this time, but patient refused. Charge RN Maria Eugenia notified.   "

## 2020-09-04 NOTE — PT/OT/SLP PROGRESS
Occupational Therapy      Patient Name:  Kylie King   MRN:  [id number]    OT orders received and patient's chart reviewed. Patient off floor in a.m for dialysis and off floor in afternoon when therapist returned for 2nd attempt. Will follow-up for OT evaluation as able.     Martha Nelson OT  9/4/2020

## 2020-09-04 NOTE — CONSULTS
[facility]  Vascular Surgery  Consult Note    Inpatient consult to Vascular Surgery  Consult performed by: Cecil Mon MD  Consult ordered by: Leonard Li MD        Subjective:     Chief Complaint/Reason for Admission: right foot gangrene    History of Present Illness: The patient is a 64 y.o. female with PMHx of DM Type 2, HTN, ESRD on dialysis TuThSa via L radiocephalic fistula created by Dr. Pollard in 8/2018 who presents with right foot cellulitis, and gangrenous appearing 1st and second toes.  She was directed to present to the ED by podiatry for a wound check on her right foot. She has chronic swelling and wounds in her right foot. She saw a podiatrist on 8/4 and had her toenails debrided. The top of her right foot turned black about one week ago. She recently finished a course of Clindamycin but states that her foot has still been getting worse. She denies any fever or chills. Vascular surgery is consulted to evaluate blood flow to foot.       Medications Prior to Admission   Medication Sig Dispense Refill Last Dose    amLODIPine (NORVASC) 10 MG tablet Take 1 tablet (10 mg total) by mouth every morning. 90 tablet 3 9/3/2020    atorvastatin (LIPITOR) 40 MG tablet Take 1 tablet (40 mg total) by mouth once daily. 90 tablet 3 9/3/2020    carvediloL (COREG) 25 MG tablet Take 1 tablet (25 mg total) by mouth 2 (two) times daily. 180 tablet 3 9/3/2020    citalopram (CELEXA) 20 MG tablet Take 1 tablet (20 mg total) by mouth nightly. 90 tablet 3 9/3/2020    famotidine (PEPCID) 20 MG tablet Take 1 tablet (20 mg total) by mouth nightly as needed. 90 tablet 3 9/3/2020    gabapentin (NEURONTIN) 400 MG capsule Take 1 capsule (400 mg total) by mouth every evening. 90 capsule 3 9/3/2020    hydrALAZINE (APRESOLINE) 25 MG tablet Take 1 tablet (25 mg total) by mouth 2 (two) times daily. 180 tablet 3 9/3/2020    insulin detemir U-100 (LEVEMIR FLEXTOUCH U-100 INSULN) 100 unit/mL (3 mL)  "InPn pen Inject 45 Units into the skin 2 (two) times daily. 81 mL 3 9/3/2020    insulin lispro (HUMALOG KWIKPEN INSULIN) 100 unit/mL pen BLOOD GLUCOSE 150-200, INJECT 1 UNITS UNDER THE SKIN, 201-250, 2 UNITS, 251-300, 3 UNITS THREE TIMES A DAY AS DIRECTED 1 Box 3 9/3/2020    insulin lispro (HUMALOG KWIKPEN INSULIN) 100 unit/mL pen BLOOD GLUCOSE 150-200, INJECT 15 UNITS UNDER THE SKIN, 201-250, 18 UNITS, 251-300, 20 UNITS THREE TIMES A DAY AS DIRECTED 45 mL 3 9/3/2020    letrozole (FEMARA) 2.5 mg Tab Take 1 tablet (2.5 mg total) by mouth nightly. 90 tablet 3 9/3/2020    acetaminophen (TYLENOL) 325 MG tablet Take 650 mg by mouth every 6 (six) hours as needed for mild pain 1-3/10 pain scale.       acetaminophen-codeine 300-30mg (TYLENOL #3) 300-30 mg Tab TAKE 1 TABLET BY MOUTH EVERY 6 HOURS AS NEEDED 90 tablet 0     albuterol (PROVENTIL/VENTOLIN HFA) 90 mcg/actuation inhaler Inhale 2 puffs into the lungs every 6 (six) hours as needed for Wheezing. Rescue 18 g 3     ammonium lactate 12 % Crea Apply twice daily to affected parts both feet as needed. 140 g 11     BD INSULIN PEN NEEDLE UF MINI 31 gauge x 3/16" Ndle USE 5 NEEDLES PER  each 2     diclofenac sodium (VOLTAREN) 1 % Gel Apply 2 grams topically 4 (four) times daily. 100 g 3     fish oil-omega-3 fatty acids 300-1,000 mg capsule Take 1 capsule by mouth every morning.       fluticasone (FLONASE) 50 mcg/actuation nasal spray 2 sprays (100 mcg total) by Each Nare route once daily. 1 Bottle 2     insulin detemir U-100 (LEVEMIR) 100 unit/mL injection Inject 45 Units into the skin 2 (two) times daily.       insulin lispro (HUMALOG KWIKPEN INSULIN) 100 unit/mL pen BLOOD GLUCOSE 150-200, INJECT 1 UNITS UNDER THE SKIN, 201-250, 2 UNITS, 251-300, 3 UNITS THREE TIMES A DAY AS DIRECTED       insulin needles, disposable, (PEN NEEDLE, DIABETIC) 31 Ndle Patient needs 5 needles a day 500 each 3     iron polysacch complex-b12-fa 150-25-1 mg-mcg-mg (FERREX 150 " "FORTE) 150-25-1 mg-mcg-mg Cap Take 150 mg by mouth once daily.       ketoconazole (NIZORAL) 2 % cream Apply topically once daily. 1 Tube 2     lancets (ONETOUCH DELICA LANCETS) 33 gauge Misc 1 lancet by Misc.(Non-Drug; Combo Route) route 4 (four) times daily before meals and nightly. 400 each 4     pen needle, diabetic 32 gauge x 5/32" Ndle 1 Device by Misc.(Non-Drug; Combo Route) route 5 (five) times daily. 450 each 6        Review of patient's allergies indicates:   Allergen Reactions    Cyclobenzaprine Hallucinations    Erythromycin      Stomach upset       Past Medical History:   Diagnosis Date    Anxiety     Arthritis     DDD, Lumbar    Breast cancer 1/2013    left breast- invasive mammary carcinoma, ER/[place] positive, Her2 negative    Carotid artery stenosis 8/13/2018 6/22/2018: Carotid Duplex: SHANELL: Moderate plaquing - 2.0 m/s - <50%. LICA: Moderate plaquing - 1.6 m/s - <50%.    Cataract     Choledocholithiasis     s/p ERCP and stent placement    Chronic obstructive pulmonary disease 6/8/2018    Chronic venous insufficiency     Colon cancer 2001    CRI (chronic renal insufficiency)     one kidney    Dialysis AV fistula malfunction     ESRD (end stage renal disease) on dialysis     Former smoker 6/8/2018    GERD (gastroesophageal reflux disease)     History of acute pancreatitis 2009 2/09 s/p sphincterotomy    History of cerebrovascular accident 6/8/2018    History of colon cancer     s/p sigmoid colectomy    HTN (hypertension), benign     Hypercholesterolemia     Hyperlipidemia     Hypoxemia 6/8/2018    Intracerebral hemorrhage     Kidney stone     left    Lymphedema of both lower extremities     Obesity     Pancreatitis     Pancreatitis 2008    Physical deconditioning     Pulmonary edema     Sacroiliac joint pain     Spinal stenosis     Spinal stenosis, lumbar     Stroke 12/2012    Tobacco abuse     Type 2 diabetes mellitus with neurological manifestations, " controlled     Neuropathy     Past Surgical History:   Procedure Laterality Date    AV FISTULA PLACEMENT Right 2018    Procedure: CREATION -FISTULA-AV;  Surgeon: RICK Pollard III, MD;  Location: [facility] OR [address] [place] [address];  Service: Peripheral Vascular;  Laterality: Right;    BREAST BIOPSY  2012    left breast invasive mammary carcinoma (lateral bx) and intraductal papilloma (medial bx)    BREAST BIOPSY      rt breast FA    CATARACT EXTRACTION W/  INTRAOCULAR LENS IMPLANT Right 2019        CATARACT EXTRACTION W/  INTRAOCULAR LENS IMPLANT Left 2019    Procedure: EXTRACTION, CATARACT, WITH IOL INSERTION;  Surgeon: Immanuel Moncada MD;  Location: [facility];  Service: Ophthalmology;  Laterality: Left;    CHOLECYSTECTOMY      COLON SURGERY      HERNIA REPAIR      left nephrectomy      atrophic kidney and hydronephrosis    left oopherectomy      MASTECTOMY  2013    left    NEPHRECTOMY      lap    REVISION OF ARTERIOVENOUS FISTULA Right 8/15/2018    Procedure: REVISION, AV FISTULA;  Surgeon: RICK Pollard III, MD;  Location: [address];  Service: Peripheral Vascular;  Laterality: Right;    SIGMOIDECTOMY      TOTAL REDUCTION MAMMOPLASTY Right      Family History     Problem Relation (Age of Onset)    Arthritis Mother    Breast cancer Maternal Grandmother    Cancer Maternal Grandmother, Maternal Aunt    Celiac disease Sister    Diabetes Father    Heart disease Mother, Father, Maternal Grandmother        Tobacco Use    Smoking status: Current Some Day Smoker     Packs/day: 0.50     Years: 28.00     Pack years: 14.00     Types: Cigarettes     Start date: 1990     Last attempt to quit: 2018     Years since quittin.6    Smokeless tobacco: Never Used    Tobacco comment: anxious   Substance and Sexual Activity    Alcohol use: No    Drug use: No    Sexual activity: Never     Partners: Male     Review of Systems   Constitutional: Negative for activity  change, chills and fever.   HENT: Negative for congestion, rhinorrhea, sinus pain and sore throat.    Respiratory: Negative for cough, chest tightness, shortness of breath and wheezing.    Cardiovascular: Negative for chest pain, palpitations and leg swelling.   Gastrointestinal: Negative for abdominal pain.   Musculoskeletal: Positive for joint swelling. Negative for myalgias.   Skin: Negative for color change and wound.   Neurological: Negative for dizziness, speech difficulty and numbness.     Objective:     Vital Signs (Most Recent):  Temp: 97.9 °F (36.6 °C) (09/04/20 0503)  Pulse: 68 (09/04/20 0600)  Resp: 16 (09/04/20 0503)  BP: (!) 158/62 (09/04/20 0518)  SpO2: 95 % (09/04/20 0503) Vital Signs (24h Range):  Temp:  [97.8 °F (36.6 °C)-98.8 °F (37.1 °C)] 97.9 °F (36.6 °C)  Pulse:  [68-84] 68  Resp:  [16-18] 16  SpO2:  [95 %-99 %] 95 %  BP: (140-177)/(62-93) 158/62     Weight: 125 kg (275 lb 9.2 oz)  Body mass index is 47.3 kg/m².    Physical Exam  Constitutional:       Appearance: She is obese.   HENT:      Head: Normocephalic.      Mouth/Throat:      Mouth: Mucous membranes are moist.   Eyes:      Pupils: Pupils are equal, round, and reactive to light.   Cardiovascular:      Rate and Rhythm: Normal rate and regular rhythm.      Comments: She has a palpable left PT pulse.  No other palpable distal pulses.  She has palpable bilateral femoral pulses  Pulmonary:      Effort: Pulmonary effort is normal. No respiratory distress.   Abdominal:      General: Abdomen is flat. There is no distension.      Palpations: Abdomen is soft.      Tenderness: There is no abdominal tenderness.      Comments: Well healed lower midline scar   Musculoskeletal:      Comments: Bilateral lower leg skin changes consistent with chronic venous stasis.  Right foot 1st and second digit with evidence of gangrene, mild cellulitis extending up foot, no gas. Motor sensory intact.  RUE AVG with good thrill   Neurological:      Mental Status: She  is alert and oriented to person, place, and time.         Significant Labs:  BMP:   Recent Labs   Lab 09/04/20  0415   *   *   K 5.3*   CL 91*   CO2 23   BUN 46*   CREATININE 7.9*   CALCIUM 9.0   MG 1.9     CBC:   Recent Labs   Lab 09/04/20  0415   WBC 9.07   RBC 3.58*   HGB 10.4*   HCT 34.8*      MCV 97   MCH 29.1   MCHC 29.9*       Significant Diagnostics:  I have reviewed all pertinent imaging results/findings within the past 24 hours.    Assessment/Plan:     * Gangrene of right foot  64 year old female with diabetic infection of right foot, likely in need of amputation of first and/or second toes    -EVELIA's with toe pressures/PPGs, duplex of RLE  -abx per primary  -please keep NPO for possible procedures today by multiple services  -Nephrology consulted for dialysis    Further recommendations pending results of non-invasive studies        Thank you for your consult.     Cecil Mon MD  Vascular Surgery  [facility]

## 2020-09-04 NOTE — HPI
The patient is a 64 y.o. female with PMHx of DM Type 2, HTN, ESRD on dialysis TuThSa via L radiocephalic fistula created by Dr. Pollard in 8/2018 who presents with right foot cellulitis, and gangrenous appearing 1st and second toes.  She was directed to present to the ED by podiatry for a wound check on her right foot. She has chronic swelling and wounds in her right foot. She saw a podiatrist on 8/4 and had her toenails debrided. The top of her right foot turned black about one week ago. She recently finished a course of Clindamycin but states that her foot has still been getting worse. She denies any fever or chills. Vascular surgery is consulted to evaluate blood flow to foot.

## 2020-09-04 NOTE — SUBJECTIVE & OBJECTIVE
Past Medical History:   Diagnosis Date    Anxiety     Arthritis     DDD, Lumbar    Breast cancer 1/2013    left breast- invasive mammary carcinoma, ER/[place] positive, Her2 negative    Carotid artery stenosis 8/13/2018 6/22/2018: Carotid Duplex: SHANELL: Moderate plaquing - 2.0 m/s - <50%. LICA: Moderate plaquing - 1.6 m/s - <50%.    Cataract     Choledocholithiasis     s/p ERCP and stent placement    Chronic obstructive pulmonary disease 6/8/2018    Chronic venous insufficiency     Colon cancer 2001    CRI (chronic renal insufficiency)     one kidney    Dialysis AV fistula malfunction     ESRD (end stage renal disease) on dialysis     Former smoker 6/8/2018    GERD (gastroesophageal reflux disease)     History of acute pancreatitis 2009 2/09 s/p sphincterotomy    History of cerebrovascular accident 6/8/2018    History of colon cancer     s/p sigmoid colectomy    HTN (hypertension), benign     Hypercholesterolemia     Hyperlipidemia     Hypoxemia 6/8/2018    Intracerebral hemorrhage     Kidney stone     left    Lymphedema of both lower extremities     Obesity     Pancreatitis     Pancreatitis 2008    Physical deconditioning     Pulmonary edema     Sacroiliac joint pain     Spinal stenosis     Spinal stenosis, lumbar     Stroke 12/2012    Tobacco abuse     Type 2 diabetes mellitus with neurological manifestations, controlled     Neuropathy       Past Surgical History:   Procedure Laterality Date    AV FISTULA PLACEMENT Right 5/28/2018    Procedure: CREATION -FISTULA-AV;  Surgeon: RIKC Pollard III, MD;  Location: [facility] OR [address] [place] [address];  Service: Peripheral Vascular;  Laterality: Right;    BREAST BIOPSY  1/2012    left breast invasive mammary carcinoma (lateral bx) and intraductal papilloma (medial bx)    BREAST BIOPSY  1999    rt breast FA    CATARACT EXTRACTION W/  INTRAOCULAR LENS IMPLANT Right 1/24/2019        CATARACT EXTRACTION W/  INTRAOCULAR LENS IMPLANT Left 1/31/2019     Procedure: EXTRACTION, CATARACT, WITH IOL INSERTION;  Surgeon: Immanuel Moncada MD;  Location: [facility];  Service: Ophthalmology;  Laterality: Left;    CHOLECYSTECTOMY  2001    COLON SURGERY      HERNIA REPAIR      left nephrectomy      atrophic kidney and hydronephrosis    left oopherectomy  2001    MASTECTOMY  2013    left    NEPHRECTOMY  2011    lap    REVISION OF ARTERIOVENOUS FISTULA Right 8/15/2018    Procedure: REVISION, AV FISTULA;  Surgeon: RICK Pollard III, MD;  [facility];  Service: Peripheral Vascular;  Laterality: Right;    SIGMOIDECTOMY  2001    TOTAL REDUCTION MAMMOPLASTY Right 2013       Review of patient's allergies indicates:   Allergen Reactions    Cyclobenzaprine Hallucinations    Erythromycin      Stomach upset       Current Facility-Administered Medications on File Prior to Encounter   Medication    mupirocin 2 % ointment     Current Outpatient Medications on File Prior to Encounter   Medication Sig    amLODIPine (NORVASC) 10 MG tablet Take 1 tablet (10 mg total) by mouth every morning.    atorvastatin (LIPITOR) 40 MG tablet Take 1 tablet (40 mg total) by mouth once daily.    carvediloL (COREG) 25 MG tablet Take 1 tablet (25 mg total) by mouth 2 (two) times daily.    citalopram (CELEXA) 20 MG tablet Take 1 tablet (20 mg total) by mouth nightly.    famotidine (PEPCID) 20 MG tablet Take 1 tablet (20 mg total) by mouth nightly as needed.    gabapentin (NEURONTIN) 400 MG capsule Take 1 capsule (400 mg total) by mouth every evening.    hydrALAZINE (APRESOLINE) 25 MG tablet Take 1 tablet (25 mg total) by mouth 2 (two) times daily.    insulin detemir U-100 (LEVEMIR FLEXTOUCH U-100 INSULN) 100 unit/mL (3 mL) InPn pen Inject 45 Units into the skin 2 (two) times daily.    insulin lispro (HUMALOG KWIKPEN INSULIN) 100 unit/mL pen BLOOD GLUCOSE 150-200, INJECT 1 UNITS UNDER THE SKIN, 201-250, 2 UNITS, 251-300, 3 UNITS THREE TIMES A DAY AS DIRECTED    insulin lispro  "(HUMALOG KWIKPEN INSULIN) 100 unit/mL pen BLOOD GLUCOSE 150-200, INJECT 15 UNITS UNDER THE SKIN, 201-250, 18 UNITS, 251-300, 20 UNITS THREE TIMES A DAY AS DIRECTED    letrozole (FEMARA) 2.5 mg Tab Take 1 tablet (2.5 mg total) by mouth nightly.    acetaminophen (TYLENOL) 325 MG tablet Take 650 mg by mouth every 6 (six) hours as needed for mild pain 1-3/10 pain scale.    acetaminophen-codeine 300-30mg (TYLENOL #3) 300-30 mg Tab TAKE 1 TABLET BY MOUTH EVERY 6 HOURS AS NEEDED    albuterol (PROVENTIL/VENTOLIN HFA) 90 mcg/actuation inhaler Inhale 2 puffs into the lungs every 6 (six) hours as needed for Wheezing. Rescue    ammonium lactate 12 % Crea Apply twice daily to affected parts both feet as needed.    BD INSULIN PEN NEEDLE UF MINI 31 gauge x 3/16" Ndle USE 5 NEEDLES PER DAY    diclofenac sodium (VOLTAREN) 1 % Gel Apply 2 grams topically 4 (four) times daily.    fish oil-omega-3 fatty acids 300-1,000 mg capsule Take 1 capsule by mouth every morning.    fluticasone (FLONASE) 50 mcg/actuation nasal spray 2 sprays (100 mcg total) by Each Nare route once daily.    insulin detemir U-100 (LEVEMIR) 100 unit/mL injection Inject 45 Units into the skin 2 (two) times daily.    insulin lispro (HUMALOG KWIKPEN INSULIN) 100 unit/mL pen BLOOD GLUCOSE 150-200, INJECT 1 UNITS UNDER THE SKIN, 201-250, 2 UNITS, 251-300, 3 UNITS THREE TIMES A DAY AS DIRECTED    insulin needles, disposable, (PEN NEEDLE, DIABETIC) 31 Ndle Patient needs 5 needles a day    iron polysacch complex-b12-fa 150-25-1 mg-mcg-mg (FERREX 150 FORTE) 150-25-1 mg-mcg-mg Cap Take 150 mg by mouth once daily.    ketoconazole (NIZORAL) 2 % cream Apply topically once daily.    lancets (ONETOUCH DELICA LANCETS) 33 gauge Misc 1 lancet by Misc.(Non-Drug; Combo Route) route 4 (four) times daily before meals and nightly.    pen needle, diabetic 32 gauge x 5/32" Ndle 1 Device by Misc.(Non-Drug; Combo Route) route 5 (five) times daily.     Family History     " Problem Relation (Age of Onset)    Arthritis Mother    Breast cancer Maternal Grandmother    Cancer Maternal Grandmother, Maternal Aunt    Celiac disease Sister    Diabetes Father    Heart disease Mother, Father, Maternal Grandmother        Tobacco Use    Smoking status: Current Some Day Smoker     Packs/day: 0.50     Years: 28.00     Pack years: 14.00     Types: Cigarettes     Start date: 1990     Last attempt to quit: 2018     Years since quittin.6    Smokeless tobacco: Never Used    Tobacco comment: anxious   Substance and Sexual Activity    Alcohol use: No    Drug use: No    Sexual activity: Never     Partners: Male     Review of Systems   Constitutional: Negative for fever and unexpected weight change.   HENT: Negative for mouth sores and trouble swallowing.         No dry eyes, dry mouth, or swollen glands. No hair loss.   Eyes: Negative for pain.   Respiratory: Positive for cough (chronic, non-productive). Negative for shortness of breath.    Cardiovascular: Negative for chest pain.   Gastrointestinal: Negative for abdominal pain, blood in stool, constipation, diarrhea, nausea and vomiting.   Genitourinary: Negative for dysuria and genital sores.   Musculoskeletal: Negative for arthralgias and joint swelling.   Skin: Positive for wound. Negative for color change and rash.   Neurological: Negative for numbness and headaches.   Hematological: Does not bruise/bleed easily.     Objective:     Vital Signs (Most Recent):  Temp: 98.8 °F (37.1 °C) (20 1447)  Pulse: 84 (20 1447)  Resp: 18 (20 1759)  BP: (!) 176/93 (20 1447)  SpO2: 95 % (20 1447) Vital Signs (24h Range):  Temp:  [98.8 °F (37.1 °C)] 98.8 °F (37.1 °C)  Pulse:  [84] 84  Resp:  [17-18] 18  SpO2:  [95 %] 95 %  BP: (176)/(93) 176/93     Weight: 125 kg (275 lb 9.2 oz)  Body mass index is 47.3 kg/m².    Physical Exam  Constitutional:       General: She is not in acute distress.     Appearance: She is  well-developed. She is obese.   HENT:      Head: Normocephalic and atraumatic.      Mouth/Throat:      Pharynx: No oropharyngeal exudate.   Eyes:      Conjunctiva/sclera: Conjunctivae normal.      Pupils: Pupils are equal, round, and reactive to light.   Neck:      Musculoskeletal: Normal range of motion and neck supple.   Cardiovascular:      Rate and Rhythm: Normal rate and regular rhythm.      Heart sounds: Normal heart sounds. No murmur.      Comments: BL dopplerable pulses  Pulmonary:      Effort: Pulmonary effort is normal. No respiratory distress.      Breath sounds: Normal breath sounds. No wheezing or rales.   Abdominal:      General: Bowel sounds are normal. There is no distension.      Palpations: Abdomen is soft.      Tenderness: There is no abdominal tenderness. There is no guarding.   Musculoskeletal:         General: No tenderness.   Skin:     General: Skin is warm and dry.      Findings: Lesion (R foot wound (see images below)) present. No rash.   Neurological:      Mental Status: She is alert and oriented to person, place, and time.      Cranial Nerves: No cranial nerve deficit.      Motor: No abnormal muscle tone.   Psychiatric:         Behavior: Behavior normal.                                Significant Labs:   CBC:   Recent Labs   Lab 09/03/20  1619   WBC 8.86   HGB 10.5*   HCT 33.6*        CMP:   Recent Labs   Lab 09/03/20  1619   *   K 4.9   CL 92*   CO2 24   *   BUN 38*   CREATININE 7.1*   CALCIUM 9.4   PROT 7.9   ALBUMIN 3.5   BILITOT 0.4   ALKPHOS 66   AST 10   ALT 18   ANIONGAP 14   EGFRNONAA 5.5*     All pertinent labs within the past 24 hours have been reviewed.    Significant Imaging: I have reviewed and interpreted all pertinent imaging results/findings within the past 24 hours.

## 2020-09-04 NOTE — HPI
65 yo F with Hx of DM2, ESRD on HD, HFpEF, HTN, HLD, chronic venous insufficiency sent to ED by Dr Robles for infection of the R 1st digit. She previously had removal of a loose L 1st digit nail and prescription of PO clindamycin for surrounding infection by Dr Robles along with debridement of all nails. Within the past week her R foot started turning black, primarily around the 1st and 2nd digits. Patient was seen in podiatry clinic 09/03 for followup and was sent to the ED. In the ED pulses were dopplerable, X ray negative for signs of bone infection, vascular surgery was consulted.

## 2020-09-04 NOTE — ASSESSMENT & PLAN NOTE
Started on HD in 4/2019  MWF schedule, last HD 9/2  Access via R radial AV fistula    Plan:  - Nephrology consulted for inpatient HD  - CMPs daily

## 2020-09-04 NOTE — HPI
Ms. King is a 63 yo F with T2DM, ESRD on HD (MWF), HFpEF (EF 50-55%), HTN, HLD, COPD, T2DM, chronic venous insufficiency, and anxiety/depression that presents from Podiatry clinic acute worsening R foot wound.    Patient has chronic venous insufficiency and T2DM with poor foot hygiene. She is followed by Podiatry (Dr. Robles). She had been seen on 8/4 for loose hallux nail with localized swelling and erythema. She underwent debridement and was prescribed clindamycin x 7 days. The wound was stable while on ABx, but wound worsened 2 days after stopping ABx. She was seen on 9/3 at Podiatry clinic. She had new gangrenous changes so was sent to ED for further evaluation.    ED Course:  Afebrile, hypertensive (170s/90s). Pulses dopplerable in ED. Labs notable for mild hypoNa, elevated BUN + Cr, hyperglycemia stable normocytic anemia, and elevated inflammatory markers. No leukocytosis. XR foot showed diffuse soft tissue swelling, healed fracture of R 5th metatarsal. Vasc Sx consulted in ED, recommended HM admission + Podiatry consult, but they anticipate future amputation. Full Vasc Sx consult to follow.

## 2020-09-04 NOTE — ASSESSMENT & PLAN NOTE
64 year old female with diabetic infection of right foot, likely in need of amputation of first and/or second toes    -EVELIA's with toe pressures/PPGs, duplex of RLE  -abx per primary  -please keep NPO for possible procedures today by multiple services  -Nephrology consulted for dialysis    Further recommendations pending results of non-invasive studies

## 2020-09-04 NOTE — SUBJECTIVE & OBJECTIVE
Scheduled Meds:   amLODIPine  10 mg Oral QAM    atorvastatin  40 mg Oral Daily    carvediloL  25 mg Oral BID WM    citalopram  20 mg Oral Nightly    famotidine  20 mg Oral QHS    gabapentin  400 mg Oral QHS    hydrALAZINE  25 mg Oral Q12H    insulin aspart U-100  4 Units Subcutaneous TIDWM    insulin detemir U-100  12 Units Subcutaneous QHS    letrozole  2.5 mg Oral Nightly     Continuous Infusions:  PRN Meds:acetaminophen, acetaminophen-codeine 300-30mg, albuterol-ipratropium, dextrose 50%, dextrose 50%, glucagon (human recombinant), glucose, glucose, insulin aspart U-100, melatonin, ondansetron, promethazine, sodium chloride 0.9%    Review of patient's allergies indicates:   Allergen Reactions    Cyclobenzaprine Hallucinations    Erythromycin      Stomach upset        Past Medical History:   Diagnosis Date    Anxiety     Arthritis     DDD, Lumbar    Breast cancer 1/2013    left breast- invasive mammary carcinoma, ER/[place] positive, Her2 negative    Carotid artery stenosis 8/13/2018 6/22/2018: Carotid Duplex: SHANELL: Moderate plaquing - 2.0 m/s - <50%. LICA: Moderate plaquing - 1.6 m/s - <50%.    Cataract     Choledocholithiasis     s/p ERCP and stent placement    Chronic obstructive pulmonary disease 6/8/2018    Chronic venous insufficiency     Colon cancer 2001    CRI (chronic renal insufficiency)     one kidney    Dialysis AV fistula malfunction     ESRD (end stage renal disease) on dialysis     Former smoker 6/8/2018    GERD (gastroesophageal reflux disease)     History of acute pancreatitis 2009 2/09 s/p sphincterotomy    History of cerebrovascular accident 6/8/2018    History of colon cancer     s/p sigmoid colectomy    HTN (hypertension), benign     Hypercholesterolemia     Hyperlipidemia     Hypoxemia 6/8/2018    Intracerebral hemorrhage     Kidney stone     left    Lymphedema of both lower extremities     Obesity     Pancreatitis     Pancreatitis 2008    Physical  deconditioning     Pulmonary edema     Sacroiliac joint pain     Spinal stenosis     Spinal stenosis, lumbar     Stroke 2012    Tobacco abuse     Type 2 diabetes mellitus with neurological manifestations, controlled     Neuropathy     Past Surgical History:   Procedure Laterality Date    AV FISTULA PLACEMENT Right 2018    Procedure: CREATION -FISTULA-AV;  Surgeon: RICK Pollard III, MD;  Location: [facility] OR [address] [place] [address];  Service: Peripheral Vascular;  Laterality: Right;    BREAST BIOPSY  2012    left breast invasive mammary carcinoma (lateral bx) and intraductal papilloma (medial bx)    BREAST BIOPSY      rt breast FA    CATARACT EXTRACTION W/  INTRAOCULAR LENS IMPLANT Right 2019        CATARACT EXTRACTION W/  INTRAOCULAR LENS IMPLANT Left 2019    Procedure: EXTRACTION, CATARACT, WITH IOL INSERTION;  Surgeon: Immanuel Moncada MD;  Location: [facility];  Service: Ophthalmology;  Laterality: Left;    CHOLECYSTECTOMY      COLON SURGERY      HERNIA REPAIR      left nephrectomy      atrophic kidney and hydronephrosis    left oopherectomy      MASTECTOMY  2013    left    NEPHRECTOMY      lap    REVISION OF ARTERIOVENOUS FISTULA Right 8/15/2018    Procedure: REVISION, AV FISTULA;  Surgeon: RICK Pollard III, MD;  Location: [facility] OR [address] [place] [address];  Service: Peripheral Vascular;  Laterality: Right;    SIGMOIDECTOMY      TOTAL REDUCTION MAMMOPLASTY Right        Family History     Problem Relation (Age of Onset)    Arthritis Mother    Breast cancer Maternal Grandmother    Cancer Maternal Grandmother, Maternal Aunt    Celiac disease Sister    Diabetes Father    Heart disease Mother, Father, Maternal Grandmother        Tobacco Use    Smoking status: Current Some Day Smoker     Packs/day: 0.50     Years: 28.00     Pack years: 14.00     Types: Cigarettes     Start date: 1990     Last attempt to quit: 2018     Years since quittin.6    Smokeless tobacco: Never  Used    Tobacco comment: anxious   Substance and Sexual Activity    Alcohol use: No    Drug use: No    Sexual activity: Never     Partners: Male     Review of Systems   Constitutional: Negative for activity change, chills and fever.   HENT: Negative for congestion, rhinorrhea, sinus pain and sore throat.    Respiratory: Negative for cough, chest tightness, shortness of breath and wheezing.    Cardiovascular: Negative for chest pain, palpitations and leg swelling.   Gastrointestinal: Negative for abdominal pain.   Musculoskeletal: Positive for joint swelling. Negative for myalgias.   Skin: Positive for color change and wound.   Neurological: Positive for numbness. Negative for dizziness and speech difficulty.     Objective:     Vital Signs (Most Recent):  Temp: 98 °F (36.7 °C) (09/04/20 1205)  Pulse: 64 (09/04/20 1205)  Resp: 18 (09/04/20 1205)  BP: (!) 149/74 (09/04/20 1205)  SpO2: 95 % (09/04/20 0802) Vital Signs (24h Range):  Temp:  [97.1 °F (36.2 °C)-98 °F (36.7 °C)] 98 °F (36.7 °C)  Pulse:  [61-80] 64  Resp:  [16-18] 18  SpO2:  [95 %-99 %] 95 %  BP: (125-202)/() 149/74     Weight: 125 kg (275 lb 9.2 oz)  Body mass index is 47.3 kg/m².    Foot Exam    General  Orientation: alert and oriented to person, place, and time   Affect: appropriate       Right Foot/Ankle     Inspection and Palpation  Ecchymosis: none  Tenderness: great toe interphalangeal joint and great toe metatarsophalangeal joint   Swelling: great toe interphalangeal joint and great toe metatarsophalangeal joint   Skin Exam: dry skin, skin changes and abnormal color;     Neurovascular  Dorsalis pedis: 1+  Posterior tibial: 1+  Saphenous nerve sensation: diminished  Tibial nerve sensation: diminished  Superficial peroneal nerve sensation: diminished  Deep peroneal nerve sensation: diminished  Sural nerve sensation: diminished      Left Foot/Ankle      Inspection and Palpation  Ecchymosis: none  Tenderness: none   Swelling: none   Skin Exam:  dry skin and skin changes;     Neurovascular  Dorsalis pedis: 1+  Posterior tibial: 1+  Saphenous nerve sensation: diminished  Tibial nerve sensation: diminished  Superficial peroneal nerve sensation: diminished  Deep peroneal nerve sensation: diminished  Sural nerve sensation: diminished            Laboratory:  CBC:   Recent Labs   Lab 09/04/20 0415   WBC 9.07   RBC 3.58*   HGB 10.4*   HCT 34.8*      MCV 97   MCH 29.1   MCHC 29.9*     CMP:   Recent Labs   Lab 09/04/20 0415   *   CALCIUM 9.0   ALBUMIN 3.4*   PROT 7.6   *   K 5.3*   CO2 23   CL 91*   BUN 46*   CREATININE 7.9*   ALKPHOS 59   ALT 15   AST 11   BILITOT 0.4     CRP:   Recent Labs   Lab 09/03/20 1619   CRP 49.2*     ESR:   Recent Labs   Lab 09/03/20 1619   SEDRATE 82*     Wound Cultures: No results for input(s): LABAERO in the last 4320 hours.  Microbiology Results (last 7 days)     Procedure Component Value Units Date/Time    Blood culture (site 1) [[id number]] Collected: 09/03/20 2243    Order Status: Completed Specimen: Blood Updated: 09/04/20 0715     Blood Culture, Routine No Growth to date    Narrative:      Site # 1, aerobic and anaerobic    Blood culture (site 2) [[id number]] Collected: 09/03/20 2243    Order Status: Completed Specimen: Blood Updated: 09/04/20 0715     Blood Culture, Routine No Growth to date    Narrative:      Site # 2, aerobic only        Specimen (12h ago, onward)    None          Diagnostic Results  X Ray 9/3: Healed fracture of the right 5th metatarsal. Diffuse soft tissue swelling.  Additional evaluation, as clinically warranted.     Clinical Findings:    Necrosis of the 1st digit to the level of the IPJ, erythema and erosions of the skin dorsally at the 1st digit from the level of the 1st IPJ to MTPJ, diffuse scaling of the dorsal right forefoot. Generalized edema of the right foot, 1+ pitting edema dorsally. No drainage, no probing to deeper layers.

## 2020-09-04 NOTE — ASSESSMENT & PLAN NOTE
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 HD stable. Afebrile, no leukocytosis. XR foot showed diffuse soft tissue swelling, healed fracture of R 5th metatarsal. Received vanc in ED. Vasc Sx consulted in ED, recommended HM admission + Podiatry consult, but they anticipate future amputation.    Plan:  - Vasc Sx, Podiatry, and ID consulted, recs appreciated  - Given clinical stability, will hold off on ABx to increase Cx yield  - BCx ordered

## 2020-09-05 LAB
ALBUMIN SERPL BCP-MCNC: 3.3 G/DL (ref 3.5–5.2)
ALP SERPL-CCNC: 61 U/L (ref 55–135)
ALT SERPL W/O P-5'-P-CCNC: 19 U/L (ref 10–44)
ANION GAP SERPL CALC-SCNC: 12 MMOL/L (ref 8–16)
AST SERPL-CCNC: 22 U/L (ref 10–40)
BASOPHILS # BLD AUTO: 0.05 K/UL (ref 0–0.2)
BASOPHILS NFR BLD: 0.5 % (ref 0–1.9)
BILIRUB SERPL-MCNC: 0.4 MG/DL (ref 0.1–1)
BUN SERPL-MCNC: 31 MG/DL (ref 8–23)
CALCIUM SERPL-MCNC: 8.8 MG/DL (ref 8.7–10.5)
CHLORIDE SERPL-SCNC: 95 MMOL/L (ref 95–110)
CO2 SERPL-SCNC: 21 MMOL/L (ref 23–29)
CREAT SERPL-MCNC: 6.2 MG/DL (ref 0.5–1.4)
DIFFERENTIAL METHOD: ABNORMAL
EOSINOPHIL # BLD AUTO: 0.1 K/UL (ref 0–0.5)
EOSINOPHIL NFR BLD: 0.9 % (ref 0–8)
ERYTHROCYTE [DISTWIDTH] IN BLOOD BY AUTOMATED COUNT: 19.4 % (ref 11.5–14.5)
EST. GFR  (AFRICAN AMERICAN): 7.5 ML/MIN/1.73 M^2
EST. GFR  (NON AFRICAN AMERICAN): 6.5 ML/MIN/1.73 M^2
GLUCOSE SERPL-MCNC: 258 MG/DL (ref 70–110)
HCT VFR BLD AUTO: 36.9 % (ref 37–48.5)
HGB BLD-MCNC: 11.7 G/DL (ref 12–16)
IMM GRANULOCYTES # BLD AUTO: 0.07 K/UL (ref 0–0.04)
IMM GRANULOCYTES NFR BLD AUTO: 0.7 % (ref 0–0.5)
LYMPHOCYTES # BLD AUTO: 1.7 K/UL (ref 1–4.8)
LYMPHOCYTES NFR BLD: 17.4 % (ref 18–48)
MAGNESIUM SERPL-MCNC: 2 MG/DL (ref 1.6–2.6)
MCH RBC QN AUTO: 29.2 PG (ref 27–31)
MCHC RBC AUTO-ENTMCNC: 31.7 G/DL (ref 32–36)
MCV RBC AUTO: 92 FL (ref 82–98)
MONOCYTES # BLD AUTO: 0.8 K/UL (ref 0.3–1)
MONOCYTES NFR BLD: 8.3 % (ref 4–15)
NEUTROPHILS # BLD AUTO: 7.1 K/UL (ref 1.8–7.7)
NEUTROPHILS NFR BLD: 72.2 % (ref 38–73)
NRBC BLD-RTO: 0 /100 WBC
PHOSPHATE SERPL-MCNC: 4.9 MG/DL (ref 2.7–4.5)
PLATELET # BLD AUTO: 168 K/UL (ref 150–350)
PMV BLD AUTO: 12.4 FL (ref 9.2–12.9)
POCT GLUCOSE: 149 MG/DL (ref 70–110)
POCT GLUCOSE: 153 MG/DL (ref 70–110)
POCT GLUCOSE: 208 MG/DL (ref 70–110)
POCT GLUCOSE: 241 MG/DL (ref 70–110)
POCT GLUCOSE: 275 MG/DL (ref 70–110)
POTASSIUM SERPL-SCNC: 5.3 MMOL/L (ref 3.5–5.1)
PROT SERPL-MCNC: 7.6 G/DL (ref 6–8.4)
RBC # BLD AUTO: 4.01 M/UL (ref 4–5.4)
SODIUM SERPL-SCNC: 128 MMOL/L (ref 136–145)
WBC # BLD AUTO: 9.87 K/UL (ref 3.9–12.7)

## 2020-09-05 PROCEDURE — 99233 PR SUBSEQUENT HOSPITAL CARE,LEVL III: ICD-10-PCS | Mod: GC,,, | Performed by: HOSPITALIST

## 2020-09-05 PROCEDURE — 99233 SBSQ HOSP IP/OBS HIGH 50: CPT | Mod: GC,,, | Performed by: HOSPITALIST

## 2020-09-05 PROCEDURE — 99232 SBSQ HOSP IP/OBS MODERATE 35: CPT | Mod: ,,, | Performed by: PODIATRIST

## 2020-09-05 PROCEDURE — 97535 SELF CARE MNGMENT TRAINING: CPT

## 2020-09-05 PROCEDURE — 90935 HEMODIALYSIS ONE EVALUATION: CPT | Mod: ,,, | Performed by: NURSE PRACTITIONER

## 2020-09-05 PROCEDURE — 20600001 HC STEP DOWN PRIVATE ROOM

## 2020-09-05 PROCEDURE — 80053 COMPREHEN METABOLIC PANEL: CPT

## 2020-09-05 PROCEDURE — 99233 PR SUBSEQUENT HOSPITAL CARE,LEVL III: ICD-10-PCS | Mod: ,,, | Performed by: PHYSICIAN ASSISTANT

## 2020-09-05 PROCEDURE — 99233 SBSQ HOSP IP/OBS HIGH 50: CPT | Mod: ,,, | Performed by: PHYSICIAN ASSISTANT

## 2020-09-05 PROCEDURE — 25500020 PHARM REV CODE 255: Performed by: HOSPITALIST

## 2020-09-05 PROCEDURE — 83735 ASSAY OF MAGNESIUM: CPT

## 2020-09-05 PROCEDURE — 84100 ASSAY OF PHOSPHORUS: CPT

## 2020-09-05 PROCEDURE — 90935 PR HEMODIALYSIS, ONE EVALUATION: ICD-10-PCS | Mod: ,,, | Performed by: NURSE PRACTITIONER

## 2020-09-05 PROCEDURE — 97165 OT EVAL LOW COMPLEX 30 MIN: CPT

## 2020-09-05 PROCEDURE — 99232 PR SUBSEQUENT HOSPITAL CARE,LEVL II: ICD-10-PCS | Mod: ,,, | Performed by: PODIATRIST

## 2020-09-05 PROCEDURE — 90935 HEMODIALYSIS ONE EVALUATION: CPT

## 2020-09-05 PROCEDURE — 25000003 PHARM REV CODE 250: Performed by: STUDENT IN AN ORGANIZED HEALTH CARE EDUCATION/TRAINING PROGRAM

## 2020-09-05 PROCEDURE — 80100016 HC MAINTENANCE HEMODIALYSIS

## 2020-09-05 PROCEDURE — 36415 COLL VENOUS BLD VENIPUNCTURE: CPT

## 2020-09-05 PROCEDURE — 63600175 PHARM REV CODE 636 W HCPCS: Performed by: STUDENT IN AN ORGANIZED HEALTH CARE EDUCATION/TRAINING PROGRAM

## 2020-09-05 PROCEDURE — 85025 COMPLETE CBC W/AUTO DIFF WBC: CPT

## 2020-09-05 RX ORDER — SODIUM CHLORIDE 9 MG/ML
INJECTION, SOLUTION INTRAVENOUS ONCE
Status: DISCONTINUED | OUTPATIENT
Start: 2020-09-05 | End: 2020-09-07 | Stop reason: HOSPADM

## 2020-09-05 RX ORDER — INSULIN ASPART 100 [IU]/ML
7 INJECTION, SOLUTION INTRAVENOUS; SUBCUTANEOUS
Status: DISCONTINUED | OUTPATIENT
Start: 2020-09-05 | End: 2020-09-06

## 2020-09-05 RX ORDER — SODIUM CHLORIDE 9 MG/ML
INJECTION, SOLUTION INTRAVENOUS ONCE
Status: DISCONTINUED | OUTPATIENT
Start: 2020-09-07 | End: 2020-09-07 | Stop reason: HOSPADM

## 2020-09-05 RX ADMIN — HYDRALAZINE HYDROCHLORIDE 25 MG: 25 TABLET, FILM COATED ORAL at 09:09

## 2020-09-05 RX ADMIN — CARVEDILOL 25 MG: 25 TABLET, FILM COATED ORAL at 05:09

## 2020-09-05 RX ADMIN — INSULIN ASPART 2 UNITS: 100 INJECTION, SOLUTION INTRAVENOUS; SUBCUTANEOUS at 05:09

## 2020-09-05 RX ADMIN — AMLODIPINE BESYLATE 10 MG: 10 TABLET ORAL at 06:09

## 2020-09-05 RX ADMIN — INSULIN ASPART 4 UNITS: 100 INJECTION, SOLUTION INTRAVENOUS; SUBCUTANEOUS at 12:09

## 2020-09-05 RX ADMIN — ACETAMINOPHEN AND CODEINE PHOSPHATE 1 TABLET: 300; 30 TABLET ORAL at 12:09

## 2020-09-05 RX ADMIN — LETROZOLE 2.5 MG: 2.5 TABLET ORAL at 10:09

## 2020-09-05 RX ADMIN — INSULIN ASPART 4 UNITS: 100 INJECTION, SOLUTION INTRAVENOUS; SUBCUTANEOUS at 08:09

## 2020-09-05 RX ADMIN — CITALOPRAM HYDROBROMIDE 20 MG: 20 TABLET ORAL at 09:09

## 2020-09-05 RX ADMIN — ATORVASTATIN CALCIUM 40 MG: 20 TABLET, FILM COATED ORAL at 08:09

## 2020-09-05 RX ADMIN — FAMOTIDINE 20 MG: 20 TABLET, FILM COATED ORAL at 09:09

## 2020-09-05 RX ADMIN — IOHEXOL 125 ML: 350 INJECTION, SOLUTION INTRAVENOUS at 07:09

## 2020-09-05 RX ADMIN — INSULIN ASPART 3 UNITS: 100 INJECTION, SOLUTION INTRAVENOUS; SUBCUTANEOUS at 09:09

## 2020-09-05 RX ADMIN — ACETAMINOPHEN AND CODEINE PHOSPHATE 1 TABLET: 300; 30 TABLET ORAL at 06:09

## 2020-09-05 RX ADMIN — CARVEDILOL 25 MG: 25 TABLET, FILM COATED ORAL at 08:09

## 2020-09-05 RX ADMIN — HYDRALAZINE HYDROCHLORIDE 25 MG: 25 TABLET, FILM COATED ORAL at 08:09

## 2020-09-05 RX ADMIN — INSULIN ASPART 7 UNITS: 100 INJECTION, SOLUTION INTRAVENOUS; SUBCUTANEOUS at 05:09

## 2020-09-05 RX ADMIN — GABAPENTIN 400 MG: 400 CAPSULE ORAL at 09:09

## 2020-09-05 NOTE — PROGRESS NOTES
[facility]  Podiatry  Progress Note    Patient Name: Kylie King  MRN: [id number]  Admission Date: 9/3/2020  Hospital Length of Stay: 2 days  Attending Physician: Steph Dinero MD  Primary Care Provider: Virginia Foote MD     Subjective:     Interval History: NAEON. Patient reports continued redness and pain in the right foot and leg which waxes and wanes, she believes it is worse today than yesterday. No other new pedal complaints. Vitals stable other than transient episode of hypoxia this morning, may have been bad reading. WBCs still WNL. ABIs yesterday showed right EVELIA 0.34 and right TBI 0.48, both concerning for vascular insufficiency of the foot. MRI negative for signs of osteomyelitis.         Scheduled Meds:   sodium chloride 0.9%   Intravenous Once    [START ON 9/7/2020] sodium chloride 0.9%   Intravenous Once    amLODIPine  10 mg Oral QAM    atorvastatin  40 mg Oral Daily    carvediloL  25 mg Oral BID WM    citalopram  20 mg Oral Nightly    famotidine  20 mg Oral QHS    gabapentin  400 mg Oral QHS    hydrALAZINE  25 mg Oral Q12H    insulin aspart U-100  4 Units Subcutaneous TIDWM    insulin detemir U-100  15 Units Subcutaneous QHS    letrozole  2.5 mg Oral Nightly     Continuous Infusions:  PRN Meds:acetaminophen, acetaminophen-codeine 300-30mg, albuterol-ipratropium, dextrose 50%, dextrose 50%, glucagon (human recombinant), glucose, glucose, insulin aspart U-100, melatonin, ondansetron, promethazine, sodium chloride 0.9%    Review of Systems   Constitutional: Negative for activity change, chills and fever.   HENT: Negative for congestion, rhinorrhea, sinus pain and sore throat.    Respiratory: Negative for cough, chest tightness, shortness of breath and wheezing.    Cardiovascular: Negative for chest pain, palpitations and leg swelling.   Gastrointestinal: Negative for abdominal pain.   Musculoskeletal: Positive for joint swelling. Negative for myalgias.   Skin:  Positive for color change and wound.   Neurological: Positive for numbness. Negative for dizziness and speech difficulty.     Objective:     Vital Signs (Most Recent):  Temp: 97.8 °F (36.6 °C) (09/05/20 1142)  Pulse: 80 (09/05/20 1142)  Resp: 18 (09/05/20 1243)  BP: (!) 110/54 (09/05/20 1142)  SpO2: 96 % (09/05/20 1142) Vital Signs (24h Range):  Temp:  [97.1 °F (36.2 °C)-99.7 °F (37.6 °C)] 97.8 °F (36.6 °C)  Pulse:  [74-94] 80  Resp:  [18] 18  SpO2:  [93 %-97 %] 96 %  BP: (110-141)/(54-98) 110/54     Weight: 125 kg (275 lb 9.2 oz)  Body mass index is 47.3 kg/m².    Foot Exam    General  Orientation: alert and oriented to person, place, and time   Affect: appropriate       Right Foot/Ankle     Inspection and Palpation  Ecchymosis: none  Tenderness: (Left 1st digit)  Swelling: (Mild, left 1st difit)  Skin Exam: dry skin, skin changes, abnormal color and erythema; (epidermal erosions)    Neurovascular  Dorsalis pedis: 1+  Posterior tibial: 1+  Saphenous nerve sensation: diminished  Tibial nerve sensation: diminished  Superficial peroneal nerve sensation: diminished  Deep peroneal nerve sensation: diminished  Sural nerve sensation: diminished    Comments  Patient now reports right sided calf tenderness    Left Foot/Ankle      Inspection and Palpation  Ecchymosis: none  Tenderness: none   Swelling: none   Skin Exam: dry skin and skin changes;     Neurovascular  Dorsalis pedis: 1+  Posterior tibial: 1+  Saphenous nerve sensation: diminished  Tibial nerve sensation: diminished  Superficial peroneal nerve sensation: diminished  Deep peroneal nerve sensation: diminished  Sural nerve sensation: diminished            Laboratory:  Blood Cultures:   Recent Labs   Lab 09/03/20  2243   LABBLOO No Growth to date  No Growth to date  No Growth to date  No Growth to date     CBC:   Recent Labs   Lab 09/05/20  0435   WBC 9.87   RBC 4.01   HGB 11.7*   HCT 36.9*      MCV 92   MCH 29.2   MCHC 31.7*     CMP:   Recent Labs   Lab  09/05/20  0435   *   CALCIUM 8.8   ALBUMIN 3.3*   PROT 7.6   *   K 5.3*   CO2 21*   CL 95   BUN 31*   CREATININE 6.2*   ALKPHOS 61   ALT 19   AST 22   BILITOT 0.4     CRP:   Recent Labs   Lab 09/03/20  1619   CRP 49.2*     ESR:   Recent Labs   Lab 09/03/20  1619   SEDRATE 82*     Wound Cultures: No results for input(s): LABAERO in the last 4320 hours.  Microbiology Results (last 7 days)     Procedure Component Value Units Date/Time    Blood culture (site 1) [[id number]] Collected: 09/03/20 2243    Order Status: Completed Specimen: Blood Updated: 09/05/20 0613     Blood Culture, Routine No Growth to date      No Growth to date    Narrative:      Site # 1, aerobic and anaerobic    Blood culture (site 2) [[id number]] Collected: 09/03/20 2243    Order Status: Completed Specimen: Blood Updated: 09/05/20 0613     Blood Culture, Routine No Growth to date      No Growth to date    Narrative:      Site # 2, aerobic only        Specimen (12h ago, onward)    None          Diagnostic Results  ABIs 09/04: Right EVELIA 0.34, right TBI 0.48. Right Leg: Segmental pressures and PVR waveforms suggest moderate to severe infrapopliteal peripheral arterial occlusive disease.     Clinical Findings  Right: Necrosis of the 1st digit to the level of the IPJ. Erythema and erosions of the skin dorsally at the 1st digit from the level of the 1st IPJ to MTPJ and at the 2nd digit from the level of the DIPJ to MTPJ. Diffuse scaling of the dorsal right medial forefoot. Mild generalized edema of the right foot. No drainage, no probing to deeper layers.   Left: small scab located over the middle phalanx of the left 2nd digit, no drainage, no surrounding edema, small amount of surrounding erythema.                       Assessment/Plan:     * Gangrene of right foot  A: 65 yo F with R 1st digit dry gangrene and infection localized to skin. Moderate to severe PAD on right per vascular studies. WBCs still WNL. ABIs yesterday showed right EVELIA  0.34 and right TBI 0.48, both concerning for vascular insufficiency of the foot. MRI negative for signs of osteomyelitis. Patient having right sided calf pain, complaining of shortness of breath today, need to rule out DVT.     P:   -Antibiotic decisions per ID.   -Wound care orders entered, twice daily betadine paint.  -Left postop shoe ordered, patient already has right postop shoe.   -Venous ultrasound ordered to rule out DVT on right, followup on results prior to discharge.   -Followup on vascular surgery recommendations, circulation is more concerning than infection at this time.  -No signs of deep infection on MRI, no need for surgical intervention from podiatry at this time.   -Will continue to follow until discharge.   Discharge Instructions: Patient to followup with podiatry outpatient within 10 days of discharge, podiatry to arrange. Home health to apply betadine paint to all foot wounds or necrotic areas twice daily, no need to cover with dressings. Patient to use bilateral postop shoes at all times ambulating.         Willie Wong DPM PGY-1  Podiatric Medicine & Surgery  [facility]

## 2020-09-05 NOTE — ASSESSMENT & PLAN NOTE
Lab Results   Component Value Date    HGBA1C 8.8 (H) 09/03/2020     Home meds: levemir 45U BID, Novolog 15U TIDWM    Plan:  - HbA1c ordered  - Detemir 12U qhs -> 20u BID   - Aspart 4 TIDWM -> 7u TIDWM  - MDSS  - POCT glucose ACHS  - Diabetic diet

## 2020-09-05 NOTE — ASSESSMENT & PLAN NOTE
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 HD stable. Afebrile, no leukocytosis. XR foot showed diffuse soft tissue swelling, healed fracture of R 5th metatarsal. Received vanc in ED. Vasc Sx consulted in ED, recommended HM admission + Podiatry consult, but they anticipate future intervention.     Vascular Surgery consulted. Reduced R TBI. CTA reviewed and plan angiography with intervention Wednesday 9/9/20. Can be outpatient  Podiatry consulted. MRI without osteomyelitis. Per recs will obtain RLE u/s to rule out dvt for calf pain. C/w betadiene    Plan:  - Vasc Sx, Podiatry, and ID consulted, recs appreciated  - Given clinical stability, will hold off on ABx to increase Cx yield  - BCx NGTD  -ID consulted, plan to keep of abx for now

## 2020-09-05 NOTE — NURSING
No patient assessment was completed today d/t to patient not being on unit throughout shift. Patient went to hemodialysis, had a CTA of BLE, left the unit without staff consent (see note), and completed MRI of right foot. Ana DOBBS notified of not being able to complete assessment.

## 2020-09-05 NOTE — PROGRESS NOTES
"VASCULAR SURGERY  Progress Note    Assessment/Plan:  64 y.o. female with critical limb ischemia with tissue loss of right foot. CTA reviewed with staff. Plan angiography with intervention Wednesday 9/9/20.  Discussed with patient.  Does not need to remain inpatient for this procedure if she can arrange transportation.    Subjective:  Found patient in hallway on walker waiting to be wheeled outside.  No complaints.  Foot is unchanged.    Objective:  BP (!) 141/98 (BP Location: Right leg, Patient Position: Lying)   Pulse 76   Temp 97.1 °F (36.2 °C) (Temporal)   Resp 18   Ht 5' 4" (1.626 m)   Wt 125 kg (275 lb 9.2 oz)   SpO2 (!) 93%   Breastfeeding No   BMI 47.30 kg/m²   In no distress  Right foot dressed    Jose Andersen MD PGY-7  Vascular and Endovascular Surgery Fellow  09/05/2020      "

## 2020-09-05 NOTE — PROGRESS NOTES
Transported from unit to [facility] in wheelchair by transport.  Report given to RINKU Hart

## 2020-09-05 NOTE — PLAN OF CARE
Patient alert and responsive to nursing care. Vital Signs WNL/ No c/o of pain or discomfort.  No cardiopulmonary distress. No chest pain. No SOB. PIV patent and saline locked. AV shunt on right arm positive thrill and bruit  Medication given per MD order.  ADL care done independently.   Last BM 9/5/20.  Comfort and safety measure maintained.  Call light and bedside table within reach.  Fall precaution maintained.    Will continue to monitor for changes of condition.

## 2020-09-05 NOTE — PROGRESS NOTES
HD Tx ended at pt request. NP Randy aware. 450ML removed during 67min Tx. Blood returned via LUE AVF. 15g needles removed x2. Gauze and tape applied. Pressure held for 5mins. Hemostasis achieved.

## 2020-09-05 NOTE — ASSESSMENT & PLAN NOTE
A: 63 yo F with R 1st digit dry gangrene and infection localized to skin. Moderate to severe PAD on right per vascular studies. Vitals stable other than transient episode of hypoxia this morning, may have been bad reading, now satting WNL on room air. WBCs still WNL. ABIs yesterday showed right EVELIA 0.34 and right TBI 0.48, both concerning for vascular insufficiency of the foot. MRI negative for signs of osteomyelitis. Patient having right sided calf pain, complaining of shortness of breath today, need to rule out DVT.     P:   -Antibiotic decisions per ID.   -Wound care orders entered, twice daily betadine paint.  -Left postop shoe ordered, patient already has right postop shoe.   -Venous ultrasound ordered to rule out DVT on right, followup on results prior to discharge.   -Followup on vascular surgery recommendations, circulation is more concerning than infection at this time.  -No signs of deep infection on MRI, no need for surgical intervention from podiatry at this time.   -Will continue to follow until discharge.   Discharge Instructions: Patient to followup with podiatry outpatient within 10 days of discharge, podiatry to arrange. Home health to apply betadine paint to all foot wounds or necrotic areas twice daily, no need to cover with dressings. Patient to use bilateral postop shoes at all times ambulating.

## 2020-09-05 NOTE — ASSESSMENT & PLAN NOTE
Started on HD in 4/2019  MWF schedule, last HD 9/2  Access via R radial AV fistula    Plan:  - HD per Nephrology today  - CMPs daily

## 2020-09-05 NOTE — PROGRESS NOTES
[facility] Hospital Medicine  Progress Note    Patient Name: Kylie King  MRN: [id number]  Patient Class: IP- Inpatient   Admission Date: 9/3/2020  Length of Stay: 2 days  Attending Physician: Steph Dinero MD  Primary Care Provider: Virginia Foote MD    [facility] Medicine Team: [facility] MED 1 Elaine Cote MD    Subjective:     Principal Problem:Gangrene of right foot         HPI:  Ms. King is a 65 yo F with T2DM, ESRD on HD (MWF), HFpEF (EF 50-55%), HTN, HLD, COPD, T2DM, chronic venous insufficiency, and anxiety/depression that presents from Podiatry clinic acute worsening R foot wound.    Patient has chronic venous insufficiency and T2DM with poor foot hygiene. She is followed by Podiatry (Dr. Robles). She had been seen on 8/4 for loose hallux nail with localized swelling and erythema. She underwent debridement and was prescribed clindamycin x 7 days. The wound was stable while on ABx, but wound worsened 2 days after stopping ABx. She was seen on 9/3 at Podiatry clinic. She had new gangrenous changes so was sent to ED for further evaluation.    ED Course:  Afebrile, hypertensive (170s/90s). Pulses dopplerable in ED. Labs notable for mild hypoNa, elevated BUN + Cr, hyperglycemia stable normocytic anemia, and elevated inflammatory markers. No leukocytosis. XR foot showed diffuse soft tissue swelling, healed fracture of R 5th metatarsal. Vasc Sx consulted in ED, recommended HM admission + Podiatry consult, but they anticipate future amputation. Full Vasc Sx consult to follow.     Overview/Hospital Course:  No notes on file    Interval History: NAEO. R TBI markedly reduced. Patient remained HDS overnight. MRI of the Right foot done without any signs of osteomyelitis. Podiatry following and requested u/s of right lower extremity to rule out DVT as patient has had some calf pain today. Remains off antibiotics.     Review of Systems   Constitutional: Negative for activity change, chills,  fatigue, fever and unexpected weight change.   HENT: Negative for congestion.    Eyes: Negative for visual disturbance.   Respiratory: Negative for cough, choking, chest tightness, shortness of breath, wheezing and stridor.    Cardiovascular: Negative for chest pain, palpitations and leg swelling.   Gastrointestinal: Negative for abdominal distention, abdominal pain, blood in stool, constipation, diarrhea, nausea and vomiting.   Endocrine: Negative for polyuria.   Genitourinary: Negative for difficulty urinating, dysuria, flank pain, frequency, urgency and vaginal discharge.   Musculoskeletal: Negative for arthralgias and gait problem.   Neurological: Negative for dizziness, tremors, seizures, facial asymmetry, weakness, numbness and headaches.   Psychiatric/Behavioral: Negative for agitation, behavioral problems, confusion and decreased concentration.     Objective:     Vital Signs (Most Recent):  Temp: 97.1 °F (36.2 °C) (09/05/20 0736)  Pulse: 76 (09/05/20 0736)  Resp: 18 (09/05/20 0736)  BP: (!) 141/98 (09/05/20 0736)  SpO2: (!) 93 % (09/05/20 0736) Vital Signs (24h Range):  Temp:  [97.1 °F (36.2 °C)-99.7 °F (37.6 °C)] 97.1 °F (36.2 °C)  Pulse:  [61-94] 76  Resp:  [18] 18  SpO2:  [93 %-97 %] 93 %  BP: (118-202)/() 141/98     Weight: 125 kg (275 lb 9.2 oz)  Body mass index is 47.3 kg/m².    Intake/Output Summary (Last 24 hours) at 9/5/2020 0801  Last data filed at 9/4/2020 1205  Gross per 24 hour   Intake 650 ml   Output 3250 ml   Net -2600 ml      Physical Exam  Constitutional:       General: She is not in acute distress.     Appearance: She is well-developed. She is not diaphoretic.   HENT:      Head: Normocephalic and atraumatic.   Eyes:      Pupils: Pupils are equal, round, and reactive to light.   Neck:      Musculoskeletal: Normal range of motion and neck supple.      Thyroid: No thyromegaly.      Vascular: No JVD.   Cardiovascular:      Rate and Rhythm: Normal rate and regular rhythm.      Heart  sounds: Normal heart sounds. No murmur. No friction rub. No gallop.    Pulmonary:      Effort: Pulmonary effort is normal. No respiratory distress.      Breath sounds: Normal breath sounds. No stridor. No wheezing or rales.   Chest:      Chest wall: No tenderness.   Abdominal:      General: Bowel sounds are normal. There is no distension.      Palpations: Abdomen is soft.      Tenderness: There is no abdominal tenderness. There is no guarding.   Musculoskeletal: Normal range of motion.      Comments: Bilateral lower leg skin changes consistent with chronic venous stasis.  Right foot 1st and second digit with evidence of gangrene, mild cellulitis extending up foot, no gas. Motor sensory intact.  RUE AVG with good thrill    Skin:     General: Skin is warm and dry.      Capillary Refill: Capillary refill takes less than 2 seconds.   Neurological:      Mental Status: She is alert and oriented to person, place, and time.      Cranial Nerves: No cranial nerve deficit.      Sensory: No sensory deficit.      Motor: No abnormal muscle tone.      Coordination: Coordination normal.      Deep Tendon Reflexes: Reflexes normal.   Psychiatric:         Behavior: Behavior normal.         Thought Content: Thought content normal.         Judgment: Judgment normal.         Significant Labs:   CBC:   Recent Labs   Lab 09/03/20  1619 09/04/20  0415 09/05/20  0435   WBC 8.86 9.07 9.87   HGB 10.5* 10.4* 11.7*   HCT 33.6* 34.8* 36.9*    321 168     CMP:   Recent Labs   Lab 09/03/20  1619 09/04/20  0415 09/05/20  0435   * 129* 128*   K 4.9 5.3* 5.3*   CL 92* 91* 95   CO2 24 23 21*   * 240* 258*   BUN 38* 46* 31*   CREATININE 7.1* 7.9* 6.2*   CALCIUM 9.4 9.0 8.8   PROT 7.9 7.6 7.6   ALBUMIN 3.5 3.4* 3.3*   BILITOT 0.4 0.4 0.4   ALKPHOS 66 59 61   AST 10 11 22   ALT 18 15 19   ANIONGAP 14 15 12   EGFRNONAA 5.5* 4.9* 6.5*     Coagulation:   Recent Labs   Lab 09/03/20  1619   INR 1.0   APTT 27.1     Magnesium:   Recent Labs    Lab 09/03/20  1619 09/04/20  0415 09/05/20  0435   MG 1.9 1.9 2.0     Urine Studies: No results for input(s): COLORU, APPEARANCEUA, PHUR, SPECGRAV, PROTEINUA, GLUCUA, KETONESU, BILIRUBINUA, OCCULTUA, NITRITE, UROBILINOGEN, LEUKOCYTESUR, RBCUA, WBCUA, BACTERIA, SQUAMEPITHEL, HYALINECASTS in the last 48 hours.    Invalid input(s): ANNETTESUR  All pertinent labs within the past 24 hours have been reviewed.    Significant Imaging: I have reviewed all pertinent imaging results/findings within the past 24 hours.      Assessment/Plan:      * Gangrene of right foot  65 yo F with gangrene of R 1st hallux. HD stable. Afebrile, no leukocytosis. XR foot showed diffuse soft tissue swelling, healed fracture of R 5th metatarsal. Received vanc in ED. Vasc Sx consulted in ED, recommended HM admission + Podiatry consult, but they anticipate future intervention.     Vascular Surgery consulted. Reduced R TBI. CTA reviewed and plan angiography with intervention Wednesday 9/9/20. Can be outpatient  Podiatry consulted. MRI without osteomyelitis. Per recs will obtain RLE u/s to rule out dvt for calf pain. C/w betadiene    Plan:  - Vasc Sx, Podiatry, and ID consulted, recs appreciated  - Given clinical stability, will hold off on ABx to increase Cx yield  - BCx NGTD  -ID consulted, plan to keep of abx for now    PVD (peripheral vascular disease)        Toe gangrene        Tobacco use  Less that 0.5ppd  Smoking cessation counseling  Nicotine patch offered (declined)    Depression  Continue home citalopram 20mg    Chronic obstructive pulmonary disease  Duo-nebs q6hr PRN  Smoking cessation counseling    History of colon cancer  S/p partial colectomy  No previous chemo or XRT    ESRD (end stage renal disease) on dialysis  Started on HD in 4/2019  MWF schedule, last HD 9/2  Access via R radial AV fistula    Plan:  - HD per Nephrology today  - CMPs daily    Heart failure, diastolic, chronic  TTE (2018) showed EF 50-55%, moderate dd  Stable, will  continue to monitor    Debility  PT/OT consulted, recs appreciated    Morbid obesity  Encouraged weight loss  Dose medications accordingly    Lumbar stenosis  Continue home gabapentin 400mg qhs    History of breast cancer  S/p mastectomy + XRT  No previous chemo    Type 2 diabetes mellitus  Lab Results   Component Value Date    HGBA1C 8.8 (H) 09/03/2020     Home meds: levemir 45U BID, Novolog 15U TIDWM    Plan:  - HbA1c ordered  - Detemir 12U qhs -> 20u BID   - Aspart 4 TIDWM -> 7u TIDWM  - MDSS  - POCT glucose ACHS  - Diabetic diet    Hypercholesterolemia  Continue home atorvastatin 40mg    Essential hypertension  Hypertensive on arrival, didn't take BP meds on day of admission  Continue home amlodipine 10mg, coreg 12.5mg BID (has been taking 25mg daily), hydralazine 25mg daily      VTE Risk Mitigation (From admission, onward)         Ordered     Reason for No Pharmacological VTE Prophylaxis  Once     Question:  Reasons:  Answer:  Physician Provided (leave comment)  Comment:  potential procedure    09/03/20 2041     IP VTE HIGH RISK PATIENT  Once      09/03/20 2041     Place sequential compression device  Until discontinued      09/03/20 1908                Discharge Planning   UMAIR:      Code Status: Full Code   Is the patient medically ready for discharge?:     Reason for patient still in hospital (select all that apply): Patient trending condition                 Elaine Cote MD  Department of Hospital Medicine   [facility]

## 2020-09-05 NOTE — SUBJECTIVE & OBJECTIVE
Interval History: NAEO. R TBI markedly reduced. Patient remained HDS overnight. MRI of the Right foot done without any signs of osteomyelitis. Podiatry following and requested u/s of right lower extremity to rule out DVT as patient has had some calf pain today. Remains off antibiotics.     Review of Systems   Constitutional: Negative for activity change, chills, fatigue, fever and unexpected weight change.   HENT: Negative for congestion.    Eyes: Negative for visual disturbance.   Respiratory: Negative for cough, choking, chest tightness, shortness of breath, wheezing and stridor.    Cardiovascular: Negative for chest pain, palpitations and leg swelling.   Gastrointestinal: Negative for abdominal distention, abdominal pain, blood in stool, constipation, diarrhea, nausea and vomiting.   Endocrine: Negative for polyuria.   Genitourinary: Negative for difficulty urinating, dysuria, flank pain, frequency, urgency and vaginal discharge.   Musculoskeletal: Negative for arthralgias and gait problem.   Neurological: Negative for dizziness, tremors, seizures, facial asymmetry, weakness, numbness and headaches.   Psychiatric/Behavioral: Negative for agitation, behavioral problems, confusion and decreased concentration.     Objective:     Vital Signs (Most Recent):  Temp: 97.1 °F (36.2 °C) (09/05/20 0736)  Pulse: 76 (09/05/20 0736)  Resp: 18 (09/05/20 0736)  BP: (!) 141/98 (09/05/20 0736)  SpO2: (!) 93 % (09/05/20 0736) Vital Signs (24h Range):  Temp:  [97.1 °F (36.2 °C)-99.7 °F (37.6 °C)] 97.1 °F (36.2 °C)  Pulse:  [61-94] 76  Resp:  [18] 18  SpO2:  [93 %-97 %] 93 %  BP: (118-202)/() 141/98     Weight: 125 kg (275 lb 9.2 oz)  Body mass index is 47.3 kg/m².    Intake/Output Summary (Last 24 hours) at 9/5/2020 0801  Last data filed at 9/4/2020 1205  Gross per 24 hour   Intake 650 ml   Output 3250 ml   Net -2600 ml      Physical Exam  Constitutional:       General: She is not in acute distress.     Appearance: She is  well-developed. She is not diaphoretic.   HENT:      Head: Normocephalic and atraumatic.   Eyes:      Pupils: Pupils are equal, round, and reactive to light.   Neck:      Musculoskeletal: Normal range of motion and neck supple.      Thyroid: No thyromegaly.      Vascular: No JVD.   Cardiovascular:      Rate and Rhythm: Normal rate and regular rhythm.      Heart sounds: Normal heart sounds. No murmur. No friction rub. No gallop.    Pulmonary:      Effort: Pulmonary effort is normal. No respiratory distress.      Breath sounds: Normal breath sounds. No stridor. No wheezing or rales.   Chest:      Chest wall: No tenderness.   Abdominal:      General: Bowel sounds are normal. There is no distension.      Palpations: Abdomen is soft.      Tenderness: There is no abdominal tenderness. There is no guarding.   Musculoskeletal: Normal range of motion.      Comments: Bilateral lower leg skin changes consistent with chronic venous stasis.  Right foot 1st and second digit with evidence of gangrene, mild cellulitis extending up foot, no gas. Motor sensory intact.  RUE AVG with good thrill    Skin:     General: Skin is warm and dry.      Capillary Refill: Capillary refill takes less than 2 seconds.   Neurological:      Mental Status: She is alert and oriented to person, place, and time.      Cranial Nerves: No cranial nerve deficit.      Sensory: No sensory deficit.      Motor: No abnormal muscle tone.      Coordination: Coordination normal.      Deep Tendon Reflexes: Reflexes normal.   Psychiatric:         Behavior: Behavior normal.         Thought Content: Thought content normal.         Judgment: Judgment normal.         Significant Labs:   CBC:   Recent Labs   Lab 09/03/20  1619 09/04/20  0415 09/05/20  0435   WBC 8.86 9.07 9.87   HGB 10.5* 10.4* 11.7*   HCT 33.6* 34.8* 36.9*    321 168     CMP:   Recent Labs   Lab 09/03/20  1619 09/04/20  0415 09/05/20  0435   * 129* 128*   K 4.9 5.3* 5.3*   CL 92* 91* 95   CO2  24 23 21*   * 240* 258*   BUN 38* 46* 31*   CREATININE 7.1* 7.9* 6.2*   CALCIUM 9.4 9.0 8.8   PROT 7.9 7.6 7.6   ALBUMIN 3.5 3.4* 3.3*   BILITOT 0.4 0.4 0.4   ALKPHOS 66 59 61   AST 10 11 22   ALT 18 15 19   ANIONGAP 14 15 12   EGFRNONAA 5.5* 4.9* 6.5*     Coagulation:   Recent Labs   Lab 09/03/20  1619   INR 1.0   APTT 27.1     Magnesium:   Recent Labs   Lab 09/03/20  1619 09/04/20  0415 09/05/20  0435   MG 1.9 1.9 2.0     Urine Studies: No results for input(s): COLORU, APPEARANCEUA, PHUR, SPECGRAV, PROTEINUA, GLUCUA, KETONESU, BILIRUBINUA, OCCULTUA, NITRITE, UROBILINOGEN, LEUKOCYTESUR, RBCUA, WBCUA, BACTERIA, SQUAMEPITHEL, HYALINECASTS in the last 48 hours.    Invalid input(s): NIKOLAY  All pertinent labs within the past 24 hours have been reviewed.    Significant Imaging: I have reviewed all pertinent imaging results/findings within the past 24 hours.

## 2020-09-05 NOTE — PT/OT/SLP PROGRESS
"Physical Therapy  Consult    Patient Name:  Kylie King   MRN:  [id number]  Admitting Diagnosis:  Gangrene of right foot   Recent Surgery: * No surgery found *    Admit Date: 9/3/2020  Length of Stay: 2 days    Physical Therapy orders received, acknowledged, and discharged.     Pt found sitting on rollator in hallway talking to OT. Patient educated on role of therapy, goals of session, and benefits of mobilizing. Pt declining PT evaluation this date. After OT left conversation, pt stated "I'm through with you too" and "I'll let them know if I need you." Pt requesting PT orders be D/Jeremiah this date and will alert team if she needs PT after or if a procedure is performed. All questions answered within PT scope of practice.    Living Environment/Occupational Profile: Pt lives in a 2SH with 4STE, B Hrs, and a WIS. Pt works as a caretaker for a house. Pt can drive but doesn't 2/2 her HD makes her feel weak. Pt's spouse, who lives in [place], brings her to all of her HD appointments and does her grocery shopping. Pt owns a rollator and WC. WC not used as it is to big, but facility WC used at HD appointments.    Pt reports she used a WC yesterday and pushed herself around the hospital and went outside. Pt requesting WC for mobilizing in hospital. RN notified of status and request.    Chelita Hernandez, PT, DPT  9/5/2020      "

## 2020-09-05 NOTE — PT/OT/SLP EVAL
"Occupational Therapy   Evaluation and Discharge Note    Name: Kylie King  MRN: [id number]  Admitting Diagnosis:  Gangrene of right foot      Recommendations:     Discharge Recommendations: home with home health  Discharge Equipment Recommendations:  bath bench  Barriers to discharge:  None    Assessment:     Kylie King is a 64 y.o. female with a medical diagnosis of Gangrene of right foot. At this time, patient is functioning at their prior level of function and does not require further acute OT services.     Plan:     During this hospitalization, patient does not require further acute OT services.  Please re-consult if situation changes.    · Plan of Care Reviewed with: patient    Subjective     Chief Complaint: None other than wish for outside leisure engagement. "Too bad if the Drs's need me. 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 Mod (I) in home mobility, rollator. Split level home. Patient resides in front Left 1st floor. Does nt access 2nd floor or Right side of a double. All needs accessible. Patient intermittently drives. Has not driven however ~ 1 month at own discretion stating "If I think I need help or might cause someone else some kind of risk, I'd rather not try." Patient reports Spouse whom resides in [place], provides transport when needed and procures all of her groceries on her bahalf weekly.   Equipment Used at home:  rollator, walker, rolling  Assistance upon Discharge: Self, HH if agreeable and indicated, Spouse, as well as 1 male family member of family that owns the dwelling she over see. Said male arrives daily at 3 am to "walk her dogs,.. sometimes hangs around during the day for company." Patient stating by the time he gets back form the dog walks , I am already up and cleaning house."     Pain/Comfort:  · Pain Rating 1: 0/10    Patients cultural, " "spiritual, Baptism conflicts given the current situation: no    Objective:     Communicated with: RN prior to session.  Patient found in doorway of own room, utilizing Rolator walker, seeking nursing permission to "go down stairs and ist outside for about an hr."  With no medical equipment attached, except of IV port distal UE.    General Precautions: Standard, fall   Orthopedic Precautions:N/A   Braces: N/A     Occupational Performance:    Bed Mobility:  Not observed as stated herein.   ·     Functional Mobility/Transfers:  · (I) <> mod (I) w/ Rollator and nursing procured w/c for facility wide volitional environmental access. 1 at time of eval later observed entering [facility] for further outdoors engagement following mid day meal.     Activities of Daily Living:  · Feeding:  independence    · Grooming: independence    · Upper Body Dressing: independence in sit  · Lower Body Dressing: NT 2* Patient access.   · Toileting: independence > modified independence per attending nurse.     Cognitive/Visual Perceptual:  Intact.     Physical Exam:  Dynamic standing fair (+) w/ rollator. BUE STRENGTH 3+ <4-/5 grossly. BUE AROM WNL. Gross/Fine motor coordination intact.     AMPAC 6 Click ADL:  AMPAC Total Score: 24    Treatment & Education:  SKILLED EDUCATION WAS PROVIDED TO PATIENT FOR:    *Patient education provided re: role of OT, and OTreatment rationales / protocols  *Importance of OOB activities daily in conjunction w/ edu related to importance of performing BUE AROM exercises even while in bed.  *Patient agreeable to therapy session only  *Lights turned on / shade open for session   *Basic self-care tasks and rudimentary functional mobility undertaken -- assist levels noted above,   *General in room safety and (S)'d mobility advised, Call button use reviewed  *Communication board up to date, questions/concerns within OT scope addressed  *Patient further engaged in there ex to BUE/BLE for all major planes of " tolerable motion x 1 set 10 reps while seated for AROM seated all joints/allplanes as indicated for recovery of effective respiration in functional tasks, and to bolster proper circulation.      Education:    Patient left engaged in w/c mobility volitionally to oiut of doors, Nurse present.      GOALS:   Multidisciplinary Problems     Occupational Therapy Goals     Not on file                History:     Past Medical History:   Diagnosis Date    Anxiety     Arthritis     DDD, Lumbar    Breast cancer 1/2013    left breast- invasive mammary carcinoma, ER/[place] positive, Her2 negative    Carotid artery stenosis 8/13/2018 6/22/2018: Carotid Duplex: SHANELL: Moderate plaquing - 2.0 m/s - <50%. LICA: Moderate plaquing - 1.6 m/s - <50%.    Cataract     Choledocholithiasis     s/p ERCP and stent placement    Chronic obstructive pulmonary disease 6/8/2018    Chronic venous insufficiency     Colon cancer 2001    CRI (chronic renal insufficiency)     one kidney    Dialysis AV fistula malfunction     ESRD (end stage renal disease) on dialysis     Former smoker 6/8/2018    GERD (gastroesophageal reflux disease)     History of acute pancreatitis 2009 2/09 s/p sphincterotomy    History of cerebrovascular accident 6/8/2018    History of colon cancer     s/p sigmoid colectomy    HTN (hypertension), benign     Hypercholesterolemia     Hyperlipidemia     Hypoxemia 6/8/2018    Intracerebral hemorrhage     Kidney stone     left    Lymphedema of both lower extremities     Obesity     Pancreatitis     Pancreatitis 2008    Physical deconditioning     Pulmonary edema     Sacroiliac joint pain     Spinal stenosis     Spinal stenosis, lumbar     Stroke 12/2012    Tobacco abuse     Type 2 diabetes mellitus with neurological manifestations, controlled     Neuropathy       Past Surgical History:   Procedure Laterality Date    AV FISTULA PLACEMENT Right 5/28/2018    Procedure: CREATION -FISTULA-AV;  Surgeon: RICK  ROSINA Pollard III, MD;  [address]  Service: Peripheral Vascular;  Laterality: Right;    BREAST BIOPSY  1/2012    left breast invasive mammary carcinoma (lateral bx) and intraductal papilloma (medial bx)    BREAST BIOPSY  1999    rt breast FA    CATARACT EXTRACTION W/  INTRAOCULAR LENS IMPLANT Right 1/24/2019        CATARACT EXTRACTION W/  INTRAOCULAR LENS IMPLANT Left 1/31/2019    Procedure: EXTRACTION, CATARACT, WITH IOL INSERTION;  Surgeon: Immanuel Moncada MD;  Location: [facility];  Service: Ophthalmology;  Laterality: Left;    CHOLECYSTECTOMY  2001    COLON SURGERY      HERNIA REPAIR      left nephrectomy      atrophic kidney and hydronephrosis    left oopherectomy  2001    MASTECTOMY  2013    left    NEPHRECTOMY  2011    lap    REVISION OF ARTERIOVENOUS FISTULA Right 8/15/2018    Procedure: REVISION, AV FISTULA;  Surgeon: RICK Pollard III, MD;  Location: [address];  Service: Peripheral Vascular;  Laterality: Right;    SIGMOIDECTOMY  2001    TOTAL REDUCTION MAMMOPLASTY Right 2013       Time Tracking:     OT Date of Treatment: 09/05/20  OT Start Time: 0910  OT Stop Time: 0935  OT Total Time (min): 25 min    Billable Minutes:Evaluation 15  Self Care/Home Management 10    POP Arias  9/5/2020

## 2020-09-05 NOTE — SUBJECTIVE & OBJECTIVE
Subjective:     Interval History: NAEON. Patient reports continued redness and pain in the right foot and leg which waxes and wanes, she believes it is worse today than yesterday. No other new pedal complaints. Vitals stable other than transient episode of hypoxia this morning, may have been bad reading. WBCs still WNL. ABIs yesterday showed right EVELIA 0.34 and right TBI 0.48, both concerning for vascular insufficiency of the foot. MRI negative for signs of osteomyelitis.         Scheduled Meds:   sodium chloride 0.9%   Intravenous Once    [START ON 9/7/2020] sodium chloride 0.9%   Intravenous Once    amLODIPine  10 mg Oral QAM    atorvastatin  40 mg Oral Daily    carvediloL  25 mg Oral BID WM    citalopram  20 mg Oral Nightly    famotidine  20 mg Oral QHS    gabapentin  400 mg Oral QHS    hydrALAZINE  25 mg Oral Q12H    insulin aspart U-100  4 Units Subcutaneous TIDWM    insulin detemir U-100  15 Units Subcutaneous QHS    letrozole  2.5 mg Oral Nightly     Continuous Infusions:  PRN Meds:acetaminophen, acetaminophen-codeine 300-30mg, albuterol-ipratropium, dextrose 50%, dextrose 50%, glucagon (human recombinant), glucose, glucose, insulin aspart U-100, melatonin, ondansetron, promethazine, sodium chloride 0.9%    Review of Systems   Constitutional: Negative for activity change, chills and fever.   HENT: Negative for congestion, rhinorrhea, sinus pain and sore throat.    Respiratory: Negative for cough, chest tightness, shortness of breath and wheezing.    Cardiovascular: Negative for chest pain, palpitations and leg swelling.   Gastrointestinal: Negative for abdominal pain.   Musculoskeletal: Positive for joint swelling. Negative for myalgias.   Skin: Positive for color change and wound.   Neurological: Positive for numbness. Negative for dizziness and speech difficulty.     Objective:     Vital Signs (Most Recent):  Temp: 97.8 °F (36.6 °C) (09/05/20 1142)  Pulse: 80 (09/05/20 1142)  Resp: 18 (09/05/20  1243)  BP: (!) 110/54 (09/05/20 1142)  SpO2: 96 % (09/05/20 1142) Vital Signs (24h Range):  Temp:  [97.1 °F (36.2 °C)-99.7 °F (37.6 °C)] 97.8 °F (36.6 °C)  Pulse:  [74-94] 80  Resp:  [18] 18  SpO2:  [93 %-97 %] 96 %  BP: (110-141)/(54-98) 110/54     Weight: 125 kg (275 lb 9.2 oz)  Body mass index is 47.3 kg/m².    Foot Exam    General  Orientation: alert and oriented to person, place, and time   Affect: appropriate       Right Foot/Ankle     Inspection and Palpation  Ecchymosis: none  Tenderness: (Left 1st digit)  Swelling: (Mild, left 1st difit)  Skin Exam: dry skin, skin changes, abnormal color and erythema; (epidermal erosions)    Neurovascular  Dorsalis pedis: 1+  Posterior tibial: 1+  Saphenous nerve sensation: diminished  Tibial nerve sensation: diminished  Superficial peroneal nerve sensation: diminished  Deep peroneal nerve sensation: diminished  Sural nerve sensation: diminished    Comments  Patient now reports right sided calf tenderness    Left Foot/Ankle      Inspection and Palpation  Ecchymosis: none  Tenderness: none   Swelling: none   Skin Exam: dry skin and skin changes;     Neurovascular  Dorsalis pedis: 1+  Posterior tibial: 1+  Saphenous nerve sensation: diminished  Tibial nerve sensation: diminished  Superficial peroneal nerve sensation: diminished  Deep peroneal nerve sensation: diminished  Sural nerve sensation: diminished            Laboratory:  Blood Cultures:   Recent Labs   Lab 09/03/20 2243   LABBLOO No Growth to date  No Growth to date  No Growth to date  No Growth to date     CBC:   Recent Labs   Lab 09/05/20  0435   WBC 9.87   RBC 4.01   HGB 11.7*   HCT 36.9*      MCV 92   MCH 29.2   MCHC 31.7*     CMP:   Recent Labs   Lab 09/05/20  0435   *   CALCIUM 8.8   ALBUMIN 3.3*   PROT 7.6   *   K 5.3*   CO2 21*   CL 95   BUN 31*   CREATININE 6.2*   ALKPHOS 61   ALT 19   AST 22   BILITOT 0.4     CRP:   Recent Labs   Lab 09/03/20  1619   CRP 49.2*     ESR:   Recent Labs    Lab 09/03/20  1619   SEDRATE 82*     Wound Cultures: No results for input(s): LABAERO in the last 4320 hours.  Microbiology Results (last 7 days)     Procedure Component Value Units Date/Time    Blood culture (site 1) [[id number]] Collected: 09/03/20 2243    Order Status: Completed Specimen: Blood Updated: 09/05/20 0613     Blood Culture, Routine No Growth to date      No Growth to date    Narrative:      Site # 1, aerobic and anaerobic    Blood culture (site 2) [[id number]] Collected: 09/03/20 2243    Order Status: Completed Specimen: Blood Updated: 09/05/20 0613     Blood Culture, Routine No Growth to date      No Growth to date    Narrative:      Site # 2, aerobic only        Specimen (12h ago, onward)    None          Diagnostic Results  ABIs 09/04: Right EVELIA 0.34, right TBI 0.48. Right Leg: Segmental pressures and PVR waveforms suggest moderate to severe infrapopliteal peripheral arterial occlusive disease.     Clinical Findings  Right: Necrosis of the 1st digit to the level of the IPJ. Erythema and erosions of the skin dorsally at the 1st digit from the level of the 1st IPJ to MTPJ and at the 2nd digit from the level of the DIPJ to MTPJ. Diffuse scaling of the dorsal right medial forefoot. Mild generalized edema of the right foot. No drainage, no probing to deeper layers.   Left: small scab located over the middle phalanx of the left 2nd digit, no drainage, no surrounding edema, small amount of surrounding erythema.

## 2020-09-05 NOTE — PROGRESS NOTES
[facility]     Patient currently on hemodialysis for removal of uremic toxins and volume.     Patient seen and evaluated on hemodialysis, tolerating treatment, see HD flowsheet for vitals and assessments.    Labs have been reviewed and the dialysate bath has been adjusted.       Assessment/Plan:    -Patient seen on HD, tolerating treatment well, w/o complaints   -HD for second day in a row. Pt brought back 2/2 inadequate clearance with HD yesterday. Pt remains mildly hyperkalemic. Will continue to monitor labs and investigate for recirculating issues if no improvement  -UF goal of 2L as tolerated  -Renal diet, if not NPO   -Strict I/O's and daily weights  -Daily renal function panels  -Keep MAP >65 while on HD   -Maintain Hgb >7.0  -Hgb 11.7; within goal for ESRD  -Will continue to follow while inpatient       ISSAC Oro, AGNP-C  Nephrology  Pager:  [phone]

## 2020-09-06 LAB
ALBUMIN SERPL BCP-MCNC: 3.2 G/DL (ref 3.5–5.2)
ALP SERPL-CCNC: 67 U/L (ref 55–135)
ALT SERPL W/O P-5'-P-CCNC: 21 U/L (ref 10–44)
ANION GAP SERPL CALC-SCNC: 16 MMOL/L (ref 8–16)
AST SERPL-CCNC: 12 U/L (ref 10–40)
BASOPHILS # BLD AUTO: 0.05 K/UL (ref 0–0.2)
BASOPHILS NFR BLD: 0.5 % (ref 0–1.9)
BILIRUB SERPL-MCNC: 0.4 MG/DL (ref 0.1–1)
BUN SERPL-MCNC: 42 MG/DL (ref 8–23)
CALCIUM SERPL-MCNC: 8.6 MG/DL (ref 8.7–10.5)
CHLORIDE SERPL-SCNC: 96 MMOL/L (ref 95–110)
CO2 SERPL-SCNC: 20 MMOL/L (ref 23–29)
CREAT SERPL-MCNC: 6.7 MG/DL (ref 0.5–1.4)
DIFFERENTIAL METHOD: ABNORMAL
EOSINOPHIL # BLD AUTO: 0.1 K/UL (ref 0–0.5)
EOSINOPHIL NFR BLD: 1.4 % (ref 0–8)
ERYTHROCYTE [DISTWIDTH] IN BLOOD BY AUTOMATED COUNT: 19.2 % (ref 11.5–14.5)
EST. GFR  (AFRICAN AMERICAN): 6.9 ML/MIN/1.73 M^2
EST. GFR  (NON AFRICAN AMERICAN): 5.9 ML/MIN/1.73 M^2
GLUCOSE SERPL-MCNC: 209 MG/DL (ref 70–110)
HCT VFR BLD AUTO: 31.5 % (ref 37–48.5)
HGB BLD-MCNC: 9.5 G/DL (ref 12–16)
IMM GRANULOCYTES # BLD AUTO: 0.08 K/UL (ref 0–0.04)
IMM GRANULOCYTES NFR BLD AUTO: 0.9 % (ref 0–0.5)
LYMPHOCYTES # BLD AUTO: 1.7 K/UL (ref 1–4.8)
LYMPHOCYTES NFR BLD: 18.4 % (ref 18–48)
MAGNESIUM SERPL-MCNC: 2 MG/DL (ref 1.6–2.6)
MCH RBC QN AUTO: 29.1 PG (ref 27–31)
MCHC RBC AUTO-ENTMCNC: 30.2 G/DL (ref 32–36)
MCV RBC AUTO: 96 FL (ref 82–98)
MONOCYTES # BLD AUTO: 0.8 K/UL (ref 0.3–1)
MONOCYTES NFR BLD: 8.6 % (ref 4–15)
NEUTROPHILS # BLD AUTO: 6.6 K/UL (ref 1.8–7.7)
NEUTROPHILS NFR BLD: 70.2 % (ref 38–73)
NRBC BLD-RTO: 1 /100 WBC
PHOSPHATE SERPL-MCNC: 5.5 MG/DL (ref 2.7–4.5)
PLATELET # BLD AUTO: 274 K/UL (ref 150–350)
PMV BLD AUTO: 10.7 FL (ref 9.2–12.9)
POCT GLUCOSE: 154 MG/DL (ref 70–110)
POCT GLUCOSE: 180 MG/DL (ref 70–110)
POCT GLUCOSE: 190 MG/DL (ref 70–110)
POCT GLUCOSE: 223 MG/DL (ref 70–110)
POCT GLUCOSE: 312 MG/DL (ref 70–110)
POTASSIUM SERPL-SCNC: 5.2 MMOL/L (ref 3.5–5.1)
PROT SERPL-MCNC: 7.2 G/DL (ref 6–8.4)
RBC # BLD AUTO: 3.27 M/UL (ref 4–5.4)
SODIUM SERPL-SCNC: 132 MMOL/L (ref 136–145)
WBC # BLD AUTO: 9.37 K/UL (ref 3.9–12.7)

## 2020-09-06 PROCEDURE — 85025 COMPLETE CBC W/AUTO DIFF WBC: CPT

## 2020-09-06 PROCEDURE — 83735 ASSAY OF MAGNESIUM: CPT

## 2020-09-06 PROCEDURE — 80053 COMPREHEN METABOLIC PANEL: CPT

## 2020-09-06 PROCEDURE — 63600175 PHARM REV CODE 636 W HCPCS: Performed by: PHYSICIAN ASSISTANT

## 2020-09-06 PROCEDURE — 25000003 PHARM REV CODE 250: Performed by: STUDENT IN AN ORGANIZED HEALTH CARE EDUCATION/TRAINING PROGRAM

## 2020-09-06 PROCEDURE — 99233 PR SUBSEQUENT HOSPITAL CARE,LEVL III: ICD-10-PCS | Mod: GC,,, | Performed by: HOSPITALIST

## 2020-09-06 PROCEDURE — 36415 COLL VENOUS BLD VENIPUNCTURE: CPT

## 2020-09-06 PROCEDURE — 84100 ASSAY OF PHOSPHORUS: CPT

## 2020-09-06 PROCEDURE — 25000003 PHARM REV CODE 250: Performed by: PHYSICIAN ASSISTANT

## 2020-09-06 PROCEDURE — 99233 SBSQ HOSP IP/OBS HIGH 50: CPT | Mod: GC,,, | Performed by: HOSPITALIST

## 2020-09-06 PROCEDURE — 94761 N-INVAS EAR/PLS OXIMETRY MLT: CPT

## 2020-09-06 PROCEDURE — 99900035 HC TECH TIME PER 15 MIN (STAT)

## 2020-09-06 PROCEDURE — 20600001 HC STEP DOWN PRIVATE ROOM

## 2020-09-06 RX ORDER — VANCOMYCIN HCL IN 5 % DEXTROSE 1G/250ML
1 PLASTIC BAG, INJECTION (ML) INTRAVENOUS
Qty: 750 ML | Refills: 0 | Status: SHIPPED | OUTPATIENT
Start: 2020-09-07 | End: 2020-09-11

## 2020-09-06 RX ORDER — CEFEPIME HYDROCHLORIDE 2 G/1
2 INJECTION, POWDER, FOR SOLUTION INTRAVENOUS
Qty: 6 G | Refills: 0 | Status: SHIPPED | OUTPATIENT
Start: 2020-09-07 | End: 2020-09-14

## 2020-09-06 RX ORDER — VANCOMYCIN HCL IN 5 % DEXTROSE 1G/250ML
1000 PLASTIC BAG, INJECTION (ML) INTRAVENOUS
Status: DISCONTINUED | OUTPATIENT
Start: 2020-09-06 | End: 2020-09-06

## 2020-09-06 RX ORDER — CEFEPIME HYDROCHLORIDE 2 G/1
2 INJECTION, POWDER, FOR SOLUTION INTRAVENOUS
Status: DISCONTINUED | OUTPATIENT
Start: 2020-09-07 | End: 2020-09-07 | Stop reason: HOSPADM

## 2020-09-06 RX ORDER — CEFEPIME HYDROCHLORIDE 2 G/1
2 INJECTION, POWDER, FOR SOLUTION INTRAVENOUS
Qty: 6 G | Refills: 0 | Status: SHIPPED | OUTPATIENT
Start: 2020-09-07 | End: 2020-09-06 | Stop reason: HOSPADM

## 2020-09-06 RX ORDER — VANCOMYCIN HCL IN 5 % DEXTROSE 1G/250ML
1000 PLASTIC BAG, INJECTION (ML) INTRAVENOUS ONCE
Status: COMPLETED | OUTPATIENT
Start: 2020-09-06 | End: 2020-09-06

## 2020-09-06 RX ORDER — CEFEPIME HYDROCHLORIDE 2 G/1
2 INJECTION, POWDER, FOR SOLUTION INTRAVENOUS
Status: DISCONTINUED | OUTPATIENT
Start: 2020-09-07 | End: 2020-09-06

## 2020-09-06 RX ORDER — CEFEPIME HYDROCHLORIDE 2 G/1
2 INJECTION, POWDER, FOR SOLUTION INTRAVENOUS
Qty: 6 G | Refills: 0 | Status: SHIPPED | OUTPATIENT
Start: 2020-09-07 | End: 2020-09-06

## 2020-09-06 RX ORDER — INSULIN ASPART 100 [IU]/ML
10 INJECTION, SOLUTION INTRAVENOUS; SUBCUTANEOUS
Status: DISCONTINUED | OUTPATIENT
Start: 2020-09-06 | End: 2020-09-07 | Stop reason: HOSPADM

## 2020-09-06 RX ORDER — CEFEPIME HYDROCHLORIDE 2 G/1
2 INJECTION, POWDER, FOR SOLUTION INTRAVENOUS ONCE
Status: COMPLETED | OUTPATIENT
Start: 2020-09-06 | End: 2020-09-06

## 2020-09-06 RX ORDER — VANCOMYCIN HCL IN 5 % DEXTROSE 1G/250ML
1000 PLASTIC BAG, INJECTION (ML) INTRAVENOUS ONCE
Status: DISCONTINUED | OUTPATIENT
Start: 2020-09-07 | End: 2020-09-06

## 2020-09-06 RX ORDER — CEFEPIME HYDROCHLORIDE 2 G/1
2 INJECTION, POWDER, FOR SOLUTION INTRAVENOUS
Status: DISCONTINUED | OUTPATIENT
Start: 2020-09-06 | End: 2020-09-06

## 2020-09-06 RX ADMIN — AMLODIPINE BESYLATE 10 MG: 10 TABLET ORAL at 06:09

## 2020-09-06 RX ADMIN — FAMOTIDINE 20 MG: 20 TABLET, FILM COATED ORAL at 10:09

## 2020-09-06 RX ADMIN — INSULIN ASPART 8 UNITS: 100 INJECTION, SOLUTION INTRAVENOUS; SUBCUTANEOUS at 12:09

## 2020-09-06 RX ADMIN — HYDRALAZINE HYDROCHLORIDE 25 MG: 25 TABLET, FILM COATED ORAL at 08:09

## 2020-09-06 RX ADMIN — CARVEDILOL 25 MG: 25 TABLET, FILM COATED ORAL at 04:09

## 2020-09-06 RX ADMIN — INSULIN ASPART 2 UNITS: 100 INJECTION, SOLUTION INTRAVENOUS; SUBCUTANEOUS at 07:09

## 2020-09-06 RX ADMIN — ACETAMINOPHEN AND CODEINE PHOSPHATE 1 TABLET: 300; 30 TABLET ORAL at 11:09

## 2020-09-06 RX ADMIN — ACETAMINOPHEN AND CODEINE PHOSPHATE 1 TABLET: 300; 30 TABLET ORAL at 06:09

## 2020-09-06 RX ADMIN — CARVEDILOL 25 MG: 25 TABLET, FILM COATED ORAL at 07:09

## 2020-09-06 RX ADMIN — PROMETHAZINE HYDROCHLORIDE 25 MG: 25 TABLET ORAL at 11:09

## 2020-09-06 RX ADMIN — INSULIN ASPART 7 UNITS: 100 INJECTION, SOLUTION INTRAVENOUS; SUBCUTANEOUS at 07:09

## 2020-09-06 RX ADMIN — ATORVASTATIN CALCIUM 40 MG: 20 TABLET, FILM COATED ORAL at 08:09

## 2020-09-06 RX ADMIN — VANCOMYCIN HYDROCHLORIDE 1000 MG: 1 INJECTION, POWDER, LYOPHILIZED, FOR SOLUTION INTRAVENOUS at 10:09

## 2020-09-06 RX ADMIN — INSULIN ASPART 7 UNITS: 100 INJECTION, SOLUTION INTRAVENOUS; SUBCUTANEOUS at 12:09

## 2020-09-06 RX ADMIN — GABAPENTIN 400 MG: 400 CAPSULE ORAL at 10:09

## 2020-09-06 RX ADMIN — HYDRALAZINE HYDROCHLORIDE 25 MG: 25 TABLET, FILM COATED ORAL at 10:09

## 2020-09-06 RX ADMIN — CITALOPRAM HYDROBROMIDE 20 MG: 20 TABLET ORAL at 09:09

## 2020-09-06 RX ADMIN — LETROZOLE 2.5 MG: 2.5 TABLET ORAL at 10:09

## 2020-09-06 RX ADMIN — CEFEPIME 2 G: 2 INJECTION, POWDER, FOR SOLUTION INTRAVENOUS at 10:09

## 2020-09-06 NOTE — HOSPITAL COURSE
On HD 2, it was noted that pt's R TBI was markedly reduced. MRI of the Right foot done without any signs of osteomyelitis. Podiatry following and requested u/s of right lower extremity to rule out DVT as patient has had some calf pain today. Vanc and Cefepime were started on 9/6/20. DVT Ruled out on 9/6/20.  Pt unable to discharge when medically ready due to inability to confirm ABx availability at dialysis center.    Interval History: No acute events overnight.    Review of Systems   Constitutional: Negative for activity change, chills, fatigue, fever and unexpected weight change.   HENT: Negative for congestion.    Eyes: Negative for visual disturbance.   Respiratory: Negative for cough, choking, chest tightness, shortness of breath, wheezing and stridor.    Cardiovascular: Negative for chest pain, palpitations and leg swelling.   Gastrointestinal: Negative for abdominal distention, abdominal pain, blood in stool, constipation, diarrhea, nausea and vomiting.   Endocrine: Negative for polyuria.   Genitourinary: Negative for difficulty urinating, dysuria, flank pain, frequency, urgency and vaginal discharge.   Musculoskeletal: Negative for arthralgias and gait problem.   Skin: Positive for wound. Negative for pallor and rash.   Neurological: Negative for dizziness, tremors, seizures, facial asymmetry, weakness, numbness and headaches.   Psychiatric/Behavioral: Positive for sleep disturbance (2/2 discomfort). Negative for agitation, behavioral problems, confusion and decreased concentration.     Objective:     Vital Signs (Most Recent):  Temp: 98.5 °F (36.9 °C) (09/07/20 1609)  Pulse: 90 (09/07/20 1609)  Resp: 18 (09/07/20 1609)  BP: 132/81 (09/07/20 1609)  SpO2: 95 % (09/07/20 1609) Vital Signs (24h Range):  Temp:  [97.6 °F (36.4 °C)-98.7 °F (37.1 °C)] 98.5 °F (36.9 °C)  Pulse:  [66-90] 90  Resp:  [18-19] 18  SpO2:  [95 %-97 %] 95 %  BP: (112-164)/() 132/81     Weight: 125 kg (275 lb 9.2 oz)  Body mass index  is 47.3 kg/m².    Intake/Output Summary (Last 24 hours) at 9/7/2020 2024  Last data filed at 9/7/2020 1115  Gross per 24 hour   Intake 600 ml   Output 3600 ml   Net -3000 ml      Physical Exam  Constitutional:       General: She is not in acute distress.     Appearance: She is well-developed. She is not diaphoretic.          Comments: In wheel chair   HENT:      Head: Normocephalic and atraumatic.      Nose: No congestion or rhinorrhea.      Mouth/Throat:      Mouth: Mucous membranes are moist.      Pharynx: Oropharynx is clear.   Eyes:      General: No scleral icterus.     Extraocular Movements: Extraocular movements intact.      Conjunctiva/sclera: Conjunctivae normal.      Pupils: Pupils are equal, round, and reactive to light.   Cardiovascular:      Rate and Rhythm: Normal rate and regular rhythm.      Heart sounds: Normal heart sounds. No murmur. No friction rub. No gallop.    Pulmonary:      Effort: Pulmonary effort is normal. No respiratory distress.      Breath sounds: Normal breath sounds. No wheezing or rales.   Abdominal:      General: Bowel sounds are normal. There is no distension.      Palpations: Abdomen is soft. There is no mass.      Tenderness: There is no abdominal tenderness. There is no guarding or rebound.   Skin:     General: Skin is warm and dry.      Coloration: Skin is not jaundiced or pale.      Findings: Lesion present. No rash.   Neurological:      General: No focal deficit present.      Mental Status: She is alert and oriented to person, place, and time. Mental status is at baseline.      Cranial Nerves: No cranial nerve deficit.   Psychiatric:         Behavior: Behavior normal.         Significant Labs:   Recent Lab Results       09/07/20  1612   09/07/20  1057   09/07/20  0835   09/07/20  0752   09/07/20  0657        Albumin               Alkaline Phosphatase               ALT               Anion Gap               AST               Baso #               Basophil%                BILIRUBIN TOTAL               BUN, Bld               Calcium               Chloride               CO2               Creatinine               Differential Method               eGFR if                eGFR if non                Eos #               Eosinophil%               Glucose               Gran # (ANC)               Gran%               Hematocrit               Hemoglobin               Immature Grans (Abs)               Immature Granulocytes               Lymph #               Lymph%               Magnesium               MCH               MCHC               MCV               Mono #               Mono%               MPV               nRBC               Phosphorus               Platelets               POCT Glucose 233 145   210 186     Potassium               PROTEIN TOTAL               RBC               RDW               Sodium               Vancomycin-Trough     18.2         WBC                                09/07/20  0503   09/06/20  2214        Albumin 3.4       Alkaline Phosphatase 64       ALT 22       Anion Gap 22       AST 26  Comment:  *Result may be interfered by visible hemolysis       Baso # 0.06       Basophil% 0.5       BILIRUBIN TOTAL 0.5  Comment:  For infants and newborns, interpretation of results should be based  on gestational age, weight and in agreement with clinical  observations.  Premature Infant recommended reference ranges:  Up to 24 hours.............<8.0 mg/dL  Up to 48 hours............<12.0 mg/dL  3-5 days..................<15.0 mg/dL  6-29 days.................<15.0 mg/dL         BUN, Bld 64       Calcium 8.9       Chloride 94       CO2 11       Creatinine 8.5       Differential Method Automated       eGFR if African American 5.1       eGFR if non  4.5  Comment:  Calculation used to obtain the estimated glomerular filtration  rate (eGFR) is the CKD-EPI equation.          Eos # 0.2       Eosinophil% 1.7       Glucose 156       Gran #  (ANC) 8.1       Gran% 72.0       Hematocrit 34.1       Hemoglobin 10.2       Immature Grans (Abs) 0.11  Comment:  Mild elevation in immature granulocytes is non specific and   can be seen in a variety of conditions including stress response,   acute inflammation, trauma and pregnancy. Correlation with other   laboratory and clinical findings is essential.         Immature Granulocytes 1.0       Lymph # 1.9       Lymph% 16.5       Magnesium 2.0       MCH 29.2       MCHC 29.9       MCV 98       Mono # 0.9       Mono% 8.3       MPV SEE COMMENT  Comment:  Result not available.       nRBC 0       Phosphorus 7.4       Platelets 310       POCT Glucose   223     Potassium 6.3  Comment:  *Critical value -   Results called to and read back by:TAYLER TENORIO RN         PROTEIN TOTAL 7.8       RBC 3.49       RDW 19.9       Sodium 127       Vancomycin-Trough         WBC 11.29             Significant Imaging: I have reviewed and interpreted all pertinent imaging results/findings within the past 24 hours.

## 2020-09-06 NOTE — PROGRESS NOTES
[facility] Hospital Medicine  Progress Note    Patient Name: Kylie King  MRN: [id number]  Patient Class: IP- Inpatient   Admission Date: 9/3/2020  Length of Stay: 3 days  Attending Physician: Steph Dinero MD  Primary Care Provider: Virginia Foote MD    [facility] Medicine Team: [facility] HOSP MED 1 Vira Doan MD    Subjective:     Principal Problem:Gangrene of right foot        HPI:  Ms. King is a 63 yo F with T2DM, ESRD on HD (MWF), HFpEF (EF 50-55%), HTN, HLD, COPD, T2DM, chronic venous insufficiency, and anxiety/depression that presents from Podiatry clinic acute worsening R foot wound.    Patient has chronic venous insufficiency and T2DM with poor foot hygiene. She is followed by Podiatry (Dr. Robles). She had been seen on 8/4 for loose hallux nail with localized swelling and erythema. She underwent debridement and was prescribed clindamycin x 7 days. The wound was stable while on ABx, but wound worsened 2 days after stopping ABx. She was seen on 9/3 at Podiatry clinic. She had new gangrenous changes so was sent to ED for further evaluation.    ED Course:  Afebrile, hypertensive (170s/90s). Pulses dopplerable in ED. Labs notable for mild hypoNa, elevated BUN + Cr, hyperglycemia stable normocytic anemia, and elevated inflammatory markers. No leukocytosis. XR foot showed diffuse soft tissue swelling, healed fracture of R 5th metatarsal. Vasc Sx consulted in ED, recommended HM admission + Podiatry consult, but they anticipate future amputation. Full Vasc Sx consult to follow.     Overview/Hospital Course:  On HD 2, it was noted that pt's R TBI was markedly reduced. MRI of the Right foot done without any signs of osteomyelitis. Podiatry following and requested u/s of right lower extremity to rule out DVT as patient has had some calf pain today. Vanc and Cefepime were started on 9/6/20.     Interval History: No acute events overnight. Pt was noncompliant with her diabetic diet and found  multiple times with snacks, chips, and/or soft drinks. Reports compliance with diabetic diet at home. Pt says she is ready to go home. No complaints.    Review of Systems   Constitutional: Negative for activity change, chills, fatigue, fever and unexpected weight change.   HENT: Negative for congestion.    Eyes: Negative for visual disturbance.   Respiratory: Negative for cough, choking, chest tightness, shortness of breath, wheezing and stridor.    Cardiovascular: Negative for chest pain, palpitations and leg swelling.   Gastrointestinal: Negative for abdominal distention, abdominal pain, blood in stool, constipation, diarrhea, nausea and vomiting.   Endocrine: Negative for polyuria.   Genitourinary: Negative for difficulty urinating, dysuria, flank pain, frequency, urgency and vaginal discharge.   Musculoskeletal: Negative for arthralgias and gait problem.   Skin: Positive for wound. Negative for pallor and rash.   Neurological: Negative for dizziness, tremors, seizures, facial asymmetry, weakness, numbness and headaches.   Psychiatric/Behavioral: Positive for sleep disturbance (2/2 discomfort). Negative for agitation, behavioral problems, confusion and decreased concentration.     Objective:     Vital Signs (Most Recent):  Temp: 98.3 °F (36.8 °C) (09/06/20 1206)  Pulse: 72 (09/06/20 1206)  Resp: 18 (09/06/20 1206)  BP: (!) 117/57 (09/06/20 1206)  SpO2: 96 % (09/06/20 1206) Vital Signs (24h Range):  Temp:  [97.3 °F (36.3 °C)-98.9 °F (37.2 °C)] 98.3 °F (36.8 °C)  Pulse:  [70-91] 72  Resp:  [16-20] 18  SpO2:  [92 %-96 %] 96 %  BP: (117-149)/(57-85) 117/57     Weight: 125 kg (275 lb 9.2 oz)  Body mass index is 47.3 kg/m².    Intake/Output Summary (Last 24 hours) at 9/6/2020 1532  Last data filed at 9/6/2020 1046  Gross per 24 hour   Intake 480 ml   Output --   Net 480 ml      Physical Exam  Constitutional:       General: She is not in acute distress.     Appearance: She is well-developed. She is not diaphoretic.           Comments: In wheel chair   HENT:      Head: Normocephalic and atraumatic.      Nose: No congestion or rhinorrhea.      Mouth/Throat:      Mouth: Mucous membranes are moist.      Pharynx: Oropharynx is clear.   Eyes:      General: No scleral icterus.     Extraocular Movements: Extraocular movements intact.      Conjunctiva/sclera: Conjunctivae normal.      Pupils: Pupils are equal, round, and reactive to light.   Cardiovascular:      Rate and Rhythm: Normal rate and regular rhythm.      Heart sounds: Normal heart sounds. No murmur. No friction rub. No gallop.    Pulmonary:      Effort: Pulmonary effort is normal. No respiratory distress.      Breath sounds: Normal breath sounds. No wheezing or rales.   Abdominal:      General: Bowel sounds are normal. There is no distension.      Palpations: Abdomen is soft. There is no mass.      Tenderness: There is no abdominal tenderness. There is no guarding or rebound.   Skin:     General: Skin is warm and dry.      Coloration: Skin is not jaundiced or pale.      Findings: Lesion present. No rash.   Neurological:      General: No focal deficit present.      Mental Status: She is alert and oriented to person, place, and time. Mental status is at baseline.      Cranial Nerves: No cranial nerve deficit.   Psychiatric:         Behavior: Behavior normal.         Significant Labs:   Recent Lab Results       09/06/20  1206   09/06/20  0714   09/06/20  0440   09/05/20  2037   09/05/20  1736        Albumin     3.2         Alkaline Phosphatase     67         ALT     21         Anion Gap     16         AST     12         Baso #     0.05         Basophil%     0.5         BILIRUBIN TOTAL     0.4  Comment:  For infants and newborns, interpretation of results should be based  on gestational age, weight and in agreement with clinical  observations.  Premature Infant recommended reference ranges:  Up to 24 hours.............<8.0 mg/dL  Up to 48 hours............<12.0 mg/dL  3-5  days..................<15.0 mg/dL  6-29 days.................<15.0 mg/dL           BUN, Bld     42         Calcium     8.6         Chloride     96         CO2     20         Creatinine     6.7         Differential Method     Automated         eGFR if      6.9         eGFR if non      5.9  Comment:  Calculation used to obtain the estimated glomerular filtration  rate (eGFR) is the CKD-EPI equation.            Eos #     0.1         Eosinophil%     1.4         Glucose     209         Gran # (ANC)     6.6         Gran%     70.2         Hematocrit     31.5         Hemoglobin     9.5         Immature Grans (Abs)     0.08  Comment:  Mild elevation in immature granulocytes is non specific and   can be seen in a variety of conditions including stress response,   acute inflammation, trauma and pregnancy. Correlation with other   laboratory and clinical findings is essential.           Immature Granulocytes     0.9         Lymph #     1.7         Lymph%     18.4         Magnesium     2.0         MCH     29.1         MCHC     30.2         MCV     96         Mono #     0.8         Mono%     8.6         MPV     10.7         nRBC     1         Phosphorus     5.5         Platelets     274         POCT Glucose 312 190   275 153     Potassium     5.2         PROTEIN TOTAL     7.2         RBC     3.27         RDW     19.2         Sodium     132         WBC     9.37                              Significant Imaging: I have reviewed all pertinent imaging results/findings within the past 24 hours.     · US Lower Extremity (9/5/20) : No evidence of deep venous thrombosis in the right lower extremity.      Assessment/Plan:      * Gangrene of right foot  65 yo F with gangrene of R 1st hallux. HD stable. Afebrile, no leukocytosis. XR foot showed diffuse soft tissue swelling, healed fracture of R 5th metatarsal. Received vanc in ED. Vasc Sx consulted in ED, recommended  admission + Podiatry consult, but they  "anticipate future intervention.     Vascular Surgery consulted. Reduced R TBI. CTA reviewed and plan angiography with intervention Wednesday 9/9/20. Can be outpatient  Podiatry consulted. MRI without osteomyelitis. Per recs will obtain RLE u/s to rule out dvt for calf pain. C/w betadiene    Plan:  - Vasc Sx, Podiatry, and ID consulted, recs appreciated  - Vanc and Cefepime initiated today. Will be given after dialysis on discharge.   -Discharge antibiotics:    -Vancomycin 1g IV x1 post HD on HD days     -Cefepime 2g IV x1 post HD on HD day   - BCx NGTD  - ID consulted      PVD (peripheral vascular disease)        Toe gangrene  See "gangrene of R foot"    Tobacco use  Less that 0.5ppd  Smoking cessation counseling  Nicotine patch offered (declined)    Depression  Continue home citalopram 20mg    Chronic obstructive pulmonary disease  Duo-nebs q6hr PRN  Smoking cessation counseling    History of colon cancer  S/p partial colectomy  No previous chemo or XRT    ESRD (end stage renal disease) on dialysis  Started on HD in 4/2019  MWF schedule, last HD 9/2  Access via R radial AV fistula    Plan:  - HD per Nephrology today  - CMPs daily    Heart failure, diastolic, chronic  TTE (2018) showed EF 50-55%, moderate dd  Stable, will continue to monitor    Debility  PT/OT consulted, recs appreciated    Morbid obesity  Encouraged weight loss  Dose medications accordingly    Lumbar stenosis  Continue home gabapentin 400mg qhs    History of breast cancer  S/p mastectomy + XRT  No previous chemo    Type 2 diabetes mellitus  Lab Results   Component Value Date    HGBA1C 8.8 (H) 09/03/2020     Home meds: levemir 45U BID, Novolog 15U TIDWM    Plan:  - HbA1c ordered  - Detemir 12U qhs -> 20u BID -> 14u BID  - Aspart 4 TIDWM -> 7u TIDWM -> 10u TIDWM  - MDSS  - POCT glucose ACHS  - Diabetic diet    Hypercholesterolemia  Continue home atorvastatin 40mg    Essential hypertension  Hypertensive on arrival, didn't take BP meds on day of " admission  Continue home amlodipine 10mg, coreg 12.5mg BID (has been taking 25mg daily), hydralazine 25mg daily      VTE Risk Mitigation (From admission, onward)         Ordered     Reason for No Pharmacological VTE Prophylaxis  Once     Question:  Reasons:  Answer:  Physician Provided (leave comment)  Comment:  potential procedure    09/03/20 2041     IP VTE HIGH RISK PATIENT  Once      09/03/20 2041     Place sequential compression device  Until discontinued      09/03/20 1908                Discharge Planning   UMAIR: 9/6/2020     Code Status: Full Code   Is the patient medically ready for discharge?:     Reason for patient still in hospital (select all that apply): Treatment                     Vira Doan MD  Department of Hospital Medicine   [facility]

## 2020-09-06 NOTE — SUBJECTIVE & OBJECTIVE
Interval History: No acute events overnight. Pt was noncompliant with her diabetic diet and found multiple times with snacks, chips, and/or soft drinks. Reports compliance with diabetic diet at home. Pt says she is ready to go home. No complaints.    Review of Systems   Constitutional: Negative for activity change, chills, fatigue, fever and unexpected weight change.   HENT: Negative for congestion.    Eyes: Negative for visual disturbance.   Respiratory: Negative for cough, choking, chest tightness, shortness of breath, wheezing and stridor.    Cardiovascular: Negative for chest pain, palpitations and leg swelling.   Gastrointestinal: Negative for abdominal distention, abdominal pain, blood in stool, constipation, diarrhea, nausea and vomiting.   Endocrine: Negative for polyuria.   Genitourinary: Negative for difficulty urinating, dysuria, flank pain, frequency, urgency and vaginal discharge.   Musculoskeletal: Negative for arthralgias and gait problem.   Skin: Positive for wound. Negative for pallor and rash.   Neurological: Negative for dizziness, tremors, seizures, facial asymmetry, weakness, numbness and headaches.   Psychiatric/Behavioral: Positive for sleep disturbance (2/2 discomfort). Negative for agitation, behavioral problems, confusion and decreased concentration.     Objective:     Vital Signs (Most Recent):  Temp: 98.3 °F (36.8 °C) (09/06/20 1206)  Pulse: 72 (09/06/20 1206)  Resp: 18 (09/06/20 1206)  BP: (!) 117/57 (09/06/20 1206)  SpO2: 96 % (09/06/20 1206) Vital Signs (24h Range):  Temp:  [97.3 °F (36.3 °C)-98.9 °F (37.2 °C)] 98.3 °F (36.8 °C)  Pulse:  [70-91] 72  Resp:  [16-20] 18  SpO2:  [92 %-96 %] 96 %  BP: (117-149)/(57-85) 117/57     Weight: 125 kg (275 lb 9.2 oz)  Body mass index is 47.3 kg/m².    Intake/Output Summary (Last 24 hours) at 9/6/2020 1532  Last data filed at 9/6/2020 1046  Gross per 24 hour   Intake 480 ml   Output --   Net 480 ml      Physical Exam  Constitutional:       General:  She is not in acute distress.     Appearance: She is well-developed. She is not diaphoretic.          Comments: In wheel chair   HENT:      Head: Normocephalic and atraumatic.      Nose: No congestion or rhinorrhea.      Mouth/Throat:      Mouth: Mucous membranes are moist.      Pharynx: Oropharynx is clear.   Eyes:      General: No scleral icterus.     Extraocular Movements: Extraocular movements intact.      Conjunctiva/sclera: Conjunctivae normal.      Pupils: Pupils are equal, round, and reactive to light.   Cardiovascular:      Rate and Rhythm: Normal rate and regular rhythm.      Heart sounds: Normal heart sounds. No murmur. No friction rub. No gallop.    Pulmonary:      Effort: Pulmonary effort is normal. No respiratory distress.      Breath sounds: Normal breath sounds. No wheezing or rales.   Abdominal:      General: Bowel sounds are normal. There is no distension.      Palpations: Abdomen is soft. There is no mass.      Tenderness: There is no abdominal tenderness. There is no guarding or rebound.   Skin:     General: Skin is warm and dry.      Coloration: Skin is not jaundiced or pale.      Findings: Lesion present. No rash.   Neurological:      General: No focal deficit present.      Mental Status: She is alert and oriented to person, place, and time. Mental status is at baseline.      Cranial Nerves: No cranial nerve deficit.   Psychiatric:         Behavior: Behavior normal.         Significant Labs:   Recent Lab Results       09/06/20  1206   09/06/20  0714   09/06/20  0440   09/05/20 2037   09/05/20  1736        Albumin     3.2         Alkaline Phosphatase     67         ALT     21         Anion Gap     16         AST     12         Baso #     0.05         Basophil%     0.5         BILIRUBIN TOTAL     0.4  Comment:  For infants and newborns, interpretation of results should be based  on gestational age, weight and in agreement with clinical  observations.  Premature Infant recommended reference  ranges:  Up to 24 hours.............<8.0 mg/dL  Up to 48 hours............<12.0 mg/dL  3-5 days..................<15.0 mg/dL  6-29 days.................<15.0 mg/dL           BUN, Bld     42         Calcium     8.6         Chloride     96         CO2     20         Creatinine     6.7         Differential Method     Automated         eGFR if      6.9         eGFR if non      5.9  Comment:  Calculation used to obtain the estimated glomerular filtration  rate (eGFR) is the CKD-EPI equation.            Eos #     0.1         Eosinophil%     1.4         Glucose     209         Gran # (ANC)     6.6         Gran%     70.2         Hematocrit     31.5         Hemoglobin     9.5         Immature Grans (Abs)     0.08  Comment:  Mild elevation in immature granulocytes is non specific and   can be seen in a variety of conditions including stress response,   acute inflammation, trauma and pregnancy. Correlation with other   laboratory and clinical findings is essential.           Immature Granulocytes     0.9         Lymph #     1.7         Lymph%     18.4         Magnesium     2.0         MCH     29.1         MCHC     30.2         MCV     96         Mono #     0.8         Mono%     8.6         MPV     10.7         nRBC     1         Phosphorus     5.5         Platelets     274         POCT Glucose 312 190   275 153     Potassium     5.2         PROTEIN TOTAL     7.2         RBC     3.27         RDW     19.2         Sodium     132         WBC     9.37                              Significant Imaging: I have reviewed all pertinent imaging results/findings within the past 24 hours.     · US Lower Extremity (9/5/20) : No evidence of deep venous thrombosis in the right lower extremity.

## 2020-09-06 NOTE — PLAN OF CARE
Patient alert and responsive to nursing care. Vital Signs WNL. No c/o of pain or discomfort.  No cardiopulmonary distress. NSR on tele. PIV patent and saline locked. AV shunt on right arm positive thrill and bruit  Medication given per MD order.   Comfort and safety measure maintained.  Call light and bedside table within reach.  Walker within reach. Will continue to monitor for changes of condition.

## 2020-09-06 NOTE — PLAN OF CARE
U/S Negative for DVT of RLE. 1/2 L removed during dialysis. Wound care provided as ordered. NSR on monitor with HR in 70s. Possible d/c tomorrow 09/06 with plans of angiogram outpatient. VSS, AOX4, no complaints of N/V. POC reviewed with pt and pt verbalizes understanding. Will continue to monitor.

## 2020-09-06 NOTE — PROGRESS NOTES
[facility]  Infectious Disease  Progress Note    Patient Name: Kylie King  MRN: [id number]  Admission Date: 9/3/2020  Length of Stay: 3 days  Attending Physician: Steph Dinero MD  Primary Care Provider: Virginia Foote MD    Isolation Status: No active isolations  Assessment/Plan:      * Gangrene of right foot  Pt is a 65 yo F with T2DM, ESRD on HD (MWF), HFpEF (EF 50-55%), HTN, HLD, COPD, T2DM, chronic venous insufficiency, and anxiety/depression that presented from Podiatry clinic acute worsening R foot wound.  Patient has chronic venous insufficiency and T2DM with poor foot hygiene. Pt stable. Elevated inflammatory markers. No leukocytosis. XR foot showed diffuse soft tissue swelling, healed fracture of R 5th metatarsal. Vasc Sx consulted in ED, recommended HM admission + Podiatry consult, but they anticipate future amputation. Vasc sx ordered studies indicating very poor blood flow to RLE .  MRI results without osteomyeltitis    No acute sx intervention by Vasc sx- patient to be dc'd and return for intervention on 9/9, no scheduled podiatry sx as of yet. Areas of foot appear wet.  THough there is no osteo on MRI I am concerned she is a ripe set up for infection and may have a slight degree of cellulitis vs vasc skin change.  In the setting of such poor blood flow suspect IV abx most appropriate and uncertain if/when surgery to take place.  If patient develops worsening infection suspect it would progress rapidly given health status and sever PVD so attempting to temporize this.     Plan  - start vanc and cefepime post HD - 1st dose today prior to dc  - reconsult ID on 9/9 when back for vasc intervention  -when surgical intervention performed, please obtain dirty and clean margin for culture (aerobic, anaerobic, afb and fungal) and path.  - recs communicated to primary team    Infection: Gangrene R toe with associated RLE cellulitis    Discharge antibiotics:   1. Vancomycin 1g IV x1 post  HD on HD days  2. Cefepime 2g IV x1 post HD on HD day    End date of IV antibiotics: tbd post sx    Weekly outpatient laboratory on Monday or Tuesday while on IV antibiotics.    CBC   CMP    ESR and CRP  Vancomycin trough. Target 15-20 - prior to HD on Mondays    If vancomycin trough is not at target (15-20) prior to discharge, the please perform vancomycin trough before their fourth outpatient dose.    Fax laboratory results to [facility] at [phone] with attn: Jose Sherman PA-C [facility]    Outpatient Infectious Diseases clinic follow up will be arranged and found in patient calendar.    Prior to discharge, please ensure the patient's follow-up has been scheduled.  If there is still no follow-up scheduled in Infectious Diseases clinic, please send an EPIC message to Annemarie Ramos in Infectious Diseases.                  Anticipated Disposition: tbd    Thank you for your consult. I will sign off. Please contact us if you have any additional questions.    BORIS De La Paz  Infectious Disease  [facility]    Subjective:     Principal Problem:Gangrene of right foot    HPI: Pt is a 63 yo F with T2DM, ESRD on HD (MWF), HFpEF (EF 50-55%), HTN, HLD, COPD, T2DM, chronic venous insufficiency, and anxiety/depression that presented from Podiatry clinic acute worsening R foot wound.  Patient has chronic venous insufficiency and T2DM with poor foot hygiene. She is followed by Podiatry (Dr. Robles). She had been seen on 8/4 for loose hallux nail with localized swelling and erythema. She underwent debridement and was prescribed clindamycin x 7 days. The wound was stable while on ABx, but wound worsened 2 days after stopping ABx. She was seen on 9/3 at Podiatry clinic. She had new gangrenous changes so was sent to ED for further evaluation.  Pt afebrile, hypertensive (170s/90s). Pulses dopplerable in ED. Elevated inflammatory markers. No leukocytosis. XR foot showed diffuse soft tissue swelling,  healed fracture of R 5th metatarsal. Vasc Sx consulted in ED, recommended  admission + Podiatry consult, but they anticipate future amputation. Vasc sx ordered ABIs results pending.  Interval History: No AEON. Afebrile and wbc wnL.  Poor sensation in R foot.  Pain in right shin.  The patient denies any recent fever, chills, or sweats.    Review of Systems   Constitutional: Negative for activity change, chills, diaphoresis and fever.   Respiratory: Negative for cough, shortness of breath and wheezing.    Cardiovascular: Negative for chest pain.   Gastrointestinal: Negative for abdominal pain, constipation, diarrhea, nausea and vomiting.   Genitourinary: Negative for dysuria, frequency and urgency.   Musculoskeletal: Positive for myalgias.   Skin: Positive for color change and wound.   Neurological: Negative for dizziness.   Hematological: Does not bruise/bleed easily.     Objective:     Vital Signs (Most Recent):  Temp: 97.3 °F (36.3 °C) (09/06/20 0710)  Pulse: 75 (09/06/20 0716)  Resp: 18 (09/06/20 0710)  BP: (!) 145/65 (09/06/20 0710)  SpO2: (!) 93 % (09/06/20 0710) Vital Signs (24h Range):  Temp:  [97.3 °F (36.3 °C)-98.9 °F (37.2 °C)] 97.3 °F (36.3 °C)  Pulse:  [6-91] 75  Resp:  [17-20] 18  SpO2:  [92 %-96 %] 93 %  BP: (110-149)/(41-85) 145/65     Weight: 125 kg (275 lb 9.2 oz)  Body mass index is 47.3 kg/m².    Estimated Creatinine Clearance: 10.9 mL/min (A) (based on SCr of 6.7 mg/dL (H)).    Physical Exam  Constitutional:       General: She is not in acute distress.     Appearance: She is well-developed. She is not diaphoretic.          Comments: In wheel chair   HENT:      Head: Normocephalic and atraumatic.   Cardiovascular:      Rate and Rhythm: Normal rate and regular rhythm.      Heart sounds: Normal heart sounds. No murmur. No friction rub. No gallop.    Pulmonary:      Effort: Pulmonary effort is normal. No respiratory distress.      Breath sounds: Normal breath sounds. No wheezing or rales.    Abdominal:      General: Bowel sounds are normal. There is no distension.      Palpations: Abdomen is soft. There is no mass.      Tenderness: There is no abdominal tenderness. There is no guarding or rebound.   Skin:     General: Skin is warm and dry.   Neurological:      Mental Status: She is alert and oriented to person, place, and time.   Psychiatric:         Behavior: Behavior normal.                     Significant Labs:   Blood Culture:   Recent Labs   Lab 09/03/20  2243   LABBLOO No Growth to date  No Growth to date  No Growth to date  No Growth to date  No Growth to date  No Growth to date     CBC:   Recent Labs   Lab 09/05/20  0435 09/06/20  0440   WBC 9.87 9.37   HGB 11.7* 9.5*   HCT 36.9* 31.5*    274     CMP:   Recent Labs   Lab 09/05/20 0435 09/06/20  0440   * 132*   K 5.3* 5.2*   CL 95 96   CO2 21* 20*   * 209*   BUN 31* 42*   CREATININE 6.2* 6.7*   CALCIUM 8.8 8.6*   PROT 7.6 7.2   ALBUMIN 3.3* 3.2*   BILITOT 0.4 0.4   ALKPHOS 61 67   AST 22 12   ALT 19 21   ANIONGAP 12 16   EGFRNONAA 6.5* 5.9*     Wound Culture: No results for input(s): LABAERO in the last 4320 hours.  All pertinent labs within the past 24 hours have been reviewed.    Significant Imaging: I have reviewed all pertinent imaging results/findings within the past 24 hours.   US Lower Extremity Veins Right [[id number]] Resulted: 09/05/20 1652   Order Status: Completed Updated: 09/05/20 1654   Narrative:     EXAMINATION:   US LOWER EXTREMITY VEINS RIGHT     CLINICAL HISTORY:   rule out dvt;     TECHNIQUE:   Duplex and color flow Doppler evaluation and graded compression of the right lower extremity veins was performed.     COMPARISON:   Ultrasound lower extremity veins right 02/27/2019.     FINDINGS:   Right thigh veins: The common femoral, femoral, popliteal, upper greater saphenous, and deep femoral veins are patent and free of thrombus. The veins are normally compressible and have normal phasic flow and  augmentation response.     Right calf veins: The visualized calf veins are patent.     Contralateral CFV: The contralateral (left) common femoral vein is patent and free of thrombus.     Miscellaneous: Soft tissue edema noted throughout the right lower extremity.    Impression:       No evidence of deep venous thrombosis in the right lower extremity.     Electronically signed by resident: Isreal Del Rosario   Date: 09/05/2020   Time: 16:37     Electronically signed by: Abelardo Paris MD   Date: 09/05/2020   Time: 16:52   CTA Runoff ABD PEL Bilat Lower Ext [[id number]] Resulted: 09/05/20 1417   Order Status: Completed Updated: 09/05/20 1419   Narrative:     EXAMINATION:   CTA RUNOFF ABD PEL BILAT LOWER EXT     CLINICAL HISTORY:   Acute limb ischemia, leg;     TECHNIQUE:   CTA of abdomen, pelvis, and bilateral lower extremities performed with 125 mL Omnipaque 350 intravenous contrast.     COMPARISON:   CT abdomen pelvis 03/12/2014.     FINDINGS:   Aorta: Normal caliber.  No dissection, penetrating ulcer, or intramural hematoma.  The celiac, SMA, and WENDI are patent.  Moderate atherosclerotic calcification involving the mesenteric vessels, predominantly the celiac branches.  No evidence of occlusion.     Right lower extremity:     Internal iliac artery demonstrates significant calcific atherosclerosis with multifocal irregularity and narrowing with segments greater than 50% stenosis.  The external iliac artery is normal in caliber with mild calcified and noncalcified plaque.  There is irregular noncalcified plaque at the origin of the right common femoral artery as well as calcific atherosclerosis along its course without significant stenosis.  The profundus demonstrates calcific atherosclerosis without focal occlusion.  The superficial femoral artery demonstrates significant calcific atherosclerosis with multifocal irregularity and irregular calcified/noncalcified plaque resulting in multiple short segments of greater  than 50% stenosis.  The popliteal artery demonstrates calcified and noncalcified plaque throughout its course with short-segment of probable 50% stenosis.  Faint opacification of the anterior tibial artery, peroneal artery, and posterior tibial artery throughout a majority of its course within of the eventual loss of the posterior tibial and peroneal prior to the ankle compatible with one-vessel runoff.  Findings may be secondary to contrast timing or vascular disease.     Left lower extremity:     Internal iliac artery demonstrates significant calcific atherosclerosis with multifocal irregularity and narrowing with segment greater than 50% stenosis.  The external iliac artery is patent with minimal calcific atherosclerosis.  The common femoral artery demonstrates calcific atherosclerosis with less than 50% stenosis.  The profundus is patent with calcific atherosclerosis along its course.  The superficial femoral artery demonstrates calcified and noncalcified plaque throughout its course with multiple short segments of greater than 50% stenosis.  The popliteal artery demonstrates calcific atherosclerosis with multiple short segment greater than 50% stenosis.  Faint opacification of the anterior tibial artery, peroneal artery, and posterior tibial artery with a ventral loss of the posterior tibial artery prior to the ankle compatible with 2 vessel runoff.  Findings again may be secondary to contrast cm vascular disease.     Liver, gallbladder, pancreas, and spleen are unremarkable.  Stable dilatation of the common bile duct measuring up to approximately 2.0 cm in this patient with cholecystectomy status.  Adrenal glands are unremarkable.  Status post left nephrectomy.  The right kidney demonstrates cortical thinning without solid enhancing mass.  No hydroureteronephrosis.  GI tract is demonstrates no evidence for obstruction or inflammation.  The appendix is normal.  Reproductive organs are unremarkable.  No ascites.   No retroperitoneal lymphadenopathy.  Mild bibasilar atelectasis.  No pulmonary mass or pneumothorax.  Surgical scar noted within the anterior midline infraumbilical abdominal wall.  Soft tissue edema noted throughout the bilateral lower extremities.     Regional osseous structures demonstrate no aggressive appearing lytic or blastic lesions.    Impression:       1. Severe peripheral artery disease involving the bilateral lower extremities as further detailed above.   2. Postsurgical changes of left nephrectomy.   3. Stable dilatation of the common bile duct in this patient with prior cholecystectomy.   4. Additional findings as above.     Electronically signed by resident: Isreal Del Rosario   Date: 09/05/2020   Time: 13:24     Electronically signed by: Abelardo Paris MD   Date: 09/05/2020   Time: 14:17   MRI Foot (Forefoot) Right Without Contrast [[id number]] Resulted: 09/05/20 1322   Order Status: Completed Updated: 09/05/20 1325   Narrative:     EXAMINATION:   MRI FOOT (FOREFOOT) RIGHT WITHOUT CONTRAST     CLINICAL HISTORY:   Osteomyelitis suspected, diabetic;     TECHNIQUE:   Multiplanar, multisequence MR imaging of the  forefoot without the use of intravenous gadolinium IV contrast.     COMPARISON:   Foot radiograph 09/03/2020.     FINDINGS:   Markedly motion limited examination with early termination secondary to reported patient pain.     No acute fracture, marrow replacement process, or evidence of osteomyelitis. Healed 5th metatarsal fracture.  Mild soft tissue edema involving the plantar aspect of the forefoot.  No evidence of effusion or synovitis.  The visualized extensor tendons are unremarkable.    Impression:       Markedly motion limited examination with early termination secondary to patient pain.  No evidence of osteomyelitis involving the right forefoot.     Electronically signed by resident: Isreal Del Rosario   Date: 09/05/2020   Time: 13:11     Electronically signed by: Abelardo Paris MD   Date:  09/05/2020   Time: 13:22   VAS Ankle Brachial Indices Resting [[id number]] Collected: 09/04/20 1224   Order Status: Completed Updated: 09/04/20 1617   Narrative:       Indication   ========     -Peripheral Arterial Disease     Pressure Lower   ============     Rt brachial A syst BP  154 mmHg   Rt PTA BP  58 mmHg   Rt dors pedis A BP 53 mmHg   Rt toe BP  74 mmHg   Right EVELIA ratio use:   post. tibial   Rt EVELIA post tibial (rt post tib A BP / max brach A BP) 0.38   Rt EVELIA (rt dors ped A BP / max brach A BP) 0.34   Rt ankle BP / max brach A BP   0.38   Rt TBI (rt toe BP / max brach A BP)    0.48   Lt PTA BP  122 mmHg   Lt dors pedis A  mmHg   Lt toe BP  135 mmHg   Lt EVELIA (lt post tibial A BP / max brach A BP)  0.79   Left EVELIA ratio use:    dors. pedis   Lt EVELIA dors ped (lt dors ped A BP / max brach A BP)    0.84   Lt ankle BP / max brach A BP   0.84   Lt TBI (lt toe BP / max brach A BP)    0.88     PPG Lower   =========     Rt toe BP - PPG    74 mmHg   Rt toe digit waveform: severely dampened   Lt toe BP - PPG    135 mmHg   Lt toe digit waveform: normal     Impression   =========     Right Leg: Segmental pressures and PVR waveforms suggest moderate to severe infrapopliteal peripheral arterial occlusive disease.     - Rt Great Toe: The PPG waveform as described above. - TBI of 0.48 with a toe pressure of 74 mmHg is noted.     Left Leg: Segmental pressures and PVR waveforms suggest minimal to mild peripheral arterial occlusive disease.   - Lt Great Toe: The PPG waveform as described above. - TBI of 0.88 with a toe pressure of 135 mmHg is noted.       DATE OF SERVICE: 09/04/2020                                                        Sonographer: Grazyna Willard   Electronically Signed by: Ryan Barrios M.D. at 09/04/2020-16:15   VAS US Arterial Leg Right [[id number]] Collected: 09/04/20 1341   Order Status: Completed Updated: 09/04/20 1617   Narrative:       Indication   ========     -Peripheral Arterial Disease      Lower Extremity Arterial Imaging   ========================     Right Lower Extremity   Rt mid CFA  cm/s   Rt prox DFA PSV    114 cm/s   Rt prox SFA PSV    117 cm/s   Rt mid SFA  cm/s   Rt distal SFA PSV  91 cm/s   Rt prox POP PSV    64 cm/s   Rt mid POP PSV 47 cm/s   Rt distal POP PSV  53 cm/s   Rt mid PTA PSV 29 cm/s   Other: - Rt Dist SFA (just beyond prominent collateral) -100 cm/s     Comment   ========     -Limited imaging. Calcifications are noted throughout the entire arterial tree, which denies complete visualization of the vessels, along   with patient tenderness and edema.     Impression   =========     Right Leg: Duplex ultrasound imaging reveals no flow identified by color or spectral doppler within the ELI, suggestive of a possible   occlusion. Flow within the CFA, DFA, and SFA appear patent without evidence of a significant stenosis within segments evaluated. A   prominent collateral is visualized at the Dist SFA level. Dampened monophasic flow is noted within the popliteal artery extending into   the PTA. However, this finding might suggest a more Distal SFA/Prox Popliteal stenosis.       DATE OF SERVICE: 09/04/2020                                                        Sonographer: Grazyna Willard   Electronically Signed by: Ryan Barrios M.D. at 09/04/2020-16:14   X-Ray Foot Complete Right [[id number]] Resulted: 09/03/20 1650   Order Status: Completed Updated: 09/03/20 1652   Narrative:     EXAMINATION:   XR FOOT COMPLETE 3 VIEW RIGHT     CLINICAL HISTORY:   Pain in unspecified foot     TECHNIQUE:   AP, lateral, and oblique views of the right foot were performed.     COMPARISON:   02/27/2018.     FINDINGS:   There is diffuse demineralization of the osseous structures.  There is a healed fracture involving the head of the right 5th metatarsal.  No new fracture is identified.     There is joint space narrowing throughout the right foot.  The Lisfranc articulation appears intact.      There is diffuse soft tissue swelling throughout the right foot.  Vascular calcifications are present.  No radiopaque foreign body is identified.    Impression:       Healed fracture of the right 5th metatarsal.     Diffuse soft tissue swelling.  Additional evaluation, as clinically warranted.       Electronically signed by: Nicolás Ha MD   Date: 09/03/2020   Time: 16:50   Imaging History    2020  Date Procedure Name Status Accession Number Location   09/05/20 04:35 PM US Lower Extremity Veins Right Final [id number] [facility]   09/05/20 08:14 AM CTA Runoff ABD PEL Bilat Lower Ext Final [id number] [facility]   09/04/20 06:34 PM MRI Foot (Forefoot) Right Without Contrast Final [id number] [facility]   09/04/20 06:32 AM VAS US Arterial Leg Right Final [id number]    09/03/20 07:02 PM VAS Ankle Brachial Indices Resting Final [id number]    09/03/20 04:41 PM X-Ray Foot Complete Right Final [id number] [facility]

## 2020-09-06 NOTE — SUBJECTIVE & OBJECTIVE
Interval History: No AEON. Afebrile and wbc wnL.  Poor sensation in R foot.  Pain in right shin.  The patient denies any recent fever, chills, or sweats.    Review of Systems   Constitutional: Negative for activity change, chills, diaphoresis and fever.   Respiratory: Negative for cough, shortness of breath and wheezing.    Cardiovascular: Negative for chest pain.   Gastrointestinal: Negative for abdominal pain, constipation, diarrhea, nausea and vomiting.   Genitourinary: Negative for dysuria, frequency and urgency.   Musculoskeletal: Positive for myalgias.   Skin: Positive for color change and wound.   Neurological: Negative for dizziness.   Hematological: Does not bruise/bleed easily.     Objective:     Vital Signs (Most Recent):  Temp: 97.3 °F (36.3 °C) (09/06/20 0710)  Pulse: 75 (09/06/20 0716)  Resp: 18 (09/06/20 0710)  BP: (!) 145/65 (09/06/20 0710)  SpO2: (!) 93 % (09/06/20 0710) Vital Signs (24h Range):  Temp:  [97.3 °F (36.3 °C)-98.9 °F (37.2 °C)] 97.3 °F (36.3 °C)  Pulse:  [6-91] 75  Resp:  [17-20] 18  SpO2:  [92 %-96 %] 93 %  BP: (110-149)/(41-85) 145/65     Weight: 125 kg (275 lb 9.2 oz)  Body mass index is 47.3 kg/m².    Estimated Creatinine Clearance: 10.9 mL/min (A) (based on SCr of 6.7 mg/dL (H)).    Physical Exam  Constitutional:       General: She is not in acute distress.     Appearance: She is well-developed. She is not diaphoretic.          Comments: In wheel chair   HENT:      Head: Normocephalic and atraumatic.   Cardiovascular:      Rate and Rhythm: Normal rate and regular rhythm.      Heart sounds: Normal heart sounds. No murmur. No friction rub. No gallop.    Pulmonary:      Effort: Pulmonary effort is normal. No respiratory distress.      Breath sounds: Normal breath sounds. No wheezing or rales.   Abdominal:      General: Bowel sounds are normal. There is no distension.      Palpations: Abdomen is soft. There is no mass.      Tenderness: There is no abdominal tenderness. There is no  guarding or rebound.   Skin:     General: Skin is warm and dry.   Neurological:      Mental Status: She is alert and oriented to person, place, and time.   Psychiatric:         Behavior: Behavior normal.                     Significant Labs:   Blood Culture:   Recent Labs   Lab 09/03/20  2243   LABBLOO No Growth to date  No Growth to date  No Growth to date  No Growth to date  No Growth to date  No Growth to date     CBC:   Recent Labs   Lab 09/05/20  0435 09/06/20  0440   WBC 9.87 9.37   HGB 11.7* 9.5*   HCT 36.9* 31.5*    274     CMP:   Recent Labs   Lab 09/05/20  0435 09/06/20  0440   * 132*   K 5.3* 5.2*   CL 95 96   CO2 21* 20*   * 209*   BUN 31* 42*   CREATININE 6.2* 6.7*   CALCIUM 8.8 8.6*   PROT 7.6 7.2   ALBUMIN 3.3* 3.2*   BILITOT 0.4 0.4   ALKPHOS 61 67   AST 22 12   ALT 19 21   ANIONGAP 12 16   EGFRNONAA 6.5* 5.9*     Wound Culture: No results for input(s): LABAERO in the last 4320 hours.  All pertinent labs within the past 24 hours have been reviewed.    Significant Imaging: I have reviewed all pertinent imaging results/findings within the past 24 hours.   US Lower Extremity Veins Right [[id number]] Resulted: 09/05/20 1652   Order Status: Completed Updated: 09/05/20 1654   Narrative:     EXAMINATION:   US LOWER EXTREMITY VEINS RIGHT     CLINICAL HISTORY:   rule out dvt;     TECHNIQUE:   Duplex and color flow Doppler evaluation and graded compression of the right lower extremity veins was performed.     COMPARISON:   Ultrasound lower extremity veins right 02/27/2019.     FINDINGS:   Right thigh veins: The common femoral, femoral, popliteal, upper greater saphenous, and deep femoral veins are patent and free of thrombus. The veins are normally compressible and have normal phasic flow and augmentation response.     Right calf veins: The visualized calf veins are patent.     Contralateral CFV: The contralateral (left) common femoral vein is patent and free of thrombus.      Miscellaneous: Soft tissue edema noted throughout the right lower extremity.    Impression:       No evidence of deep venous thrombosis in the right lower extremity.     Electronically signed by resident: Isreal Del Rosario   Date: 09/05/2020   Time: 16:37     Electronically signed by: Abelardo Paris MD   Date: 09/05/2020   Time: 16:52   CTA Runoff ABD PEL Bilat Lower Ext [[id number]] Resulted: 09/05/20 1417   Order Status: Completed Updated: 09/05/20 1419   Narrative:     EXAMINATION:   CTA RUNOFF ABD PEL BILAT LOWER EXT     CLINICAL HISTORY:   Acute limb ischemia, leg;     TECHNIQUE:   CTA of abdomen, pelvis, and bilateral lower extremities performed with 125 mL Omnipaque 350 intravenous contrast.     COMPARISON:   CT abdomen pelvis 03/12/2014.     FINDINGS:   Aorta: Normal caliber.  No dissection, penetrating ulcer, or intramural hematoma.  The celiac, SMA, and WENDI are patent.  Moderate atherosclerotic calcification involving the mesenteric vessels, predominantly the celiac branches.  No evidence of occlusion.     Right lower extremity:     Internal iliac artery demonstrates significant calcific atherosclerosis with multifocal irregularity and narrowing with segments greater than 50% stenosis.  The external iliac artery is normal in caliber with mild calcified and noncalcified plaque.  There is irregular noncalcified plaque at the origin of the right common femoral artery as well as calcific atherosclerosis along its course without significant stenosis.  The profundus demonstrates calcific atherosclerosis without focal occlusion.  The superficial femoral artery demonstrates significant calcific atherosclerosis with multifocal irregularity and irregular calcified/noncalcified plaque resulting in multiple short segments of greater than 50% stenosis.  The popliteal artery demonstrates calcified and noncalcified plaque throughout its course with short-segment of probable 50% stenosis.  Faint opacification of the  anterior tibial artery, peroneal artery, and posterior tibial artery throughout a majority of its course within of the eventual loss of the posterior tibial and peroneal prior to the ankle compatible with one-vessel runoff.  Findings may be secondary to contrast timing or vascular disease.     Left lower extremity:     Internal iliac artery demonstrates significant calcific atherosclerosis with multifocal irregularity and narrowing with segment greater than 50% stenosis.  The external iliac artery is patent with minimal calcific atherosclerosis.  The common femoral artery demonstrates calcific atherosclerosis with less than 50% stenosis.  The profundus is patent with calcific atherosclerosis along its course.  The superficial femoral artery demonstrates calcified and noncalcified plaque throughout its course with multiple short segments of greater than 50% stenosis.  The popliteal artery demonstrates calcific atherosclerosis with multiple short segment greater than 50% stenosis.  Faint opacification of the anterior tibial artery, peroneal artery, and posterior tibial artery with a ventral loss of the posterior tibial artery prior to the ankle compatible with 2 vessel runoff.  Findings again may be secondary to contrast cm vascular disease.     Liver, gallbladder, pancreas, and spleen are unremarkable.  Stable dilatation of the common bile duct measuring up to approximately 2.0 cm in this patient with cholecystectomy status.  Adrenal glands are unremarkable.  Status post left nephrectomy.  The right kidney demonstrates cortical thinning without solid enhancing mass.  No hydroureteronephrosis.  GI tract is demonstrates no evidence for obstruction or inflammation.  The appendix is normal.  Reproductive organs are unremarkable.  No ascites.  No retroperitoneal lymphadenopathy.  Mild bibasilar atelectasis.  No pulmonary mass or pneumothorax.  Surgical scar noted within the anterior midline infraumbilical abdominal wall.   Soft tissue edema noted throughout the bilateral lower extremities.     Regional osseous structures demonstrate no aggressive appearing lytic or blastic lesions.    Impression:       1. Severe peripheral artery disease involving the bilateral lower extremities as further detailed above.   2. Postsurgical changes of left nephrectomy.   3. Stable dilatation of the common bile duct in this patient with prior cholecystectomy.   4. Additional findings as above.     Electronically signed by resident: Isreal Del Rosario   Date: 09/05/2020   Time: 13:24     Electronically signed by: Abelardo Paris MD   Date: 09/05/2020   Time: 14:17   MRI Foot (Forefoot) Right Without Contrast [[id number]] Resulted: 09/05/20 1322   Order Status: Completed Updated: 09/05/20 1325   Narrative:     EXAMINATION:   MRI FOOT (FOREFOOT) RIGHT WITHOUT CONTRAST     CLINICAL HISTORY:   Osteomyelitis suspected, diabetic;     TECHNIQUE:   Multiplanar, multisequence MR imaging of the  forefoot without the use of intravenous gadolinium IV contrast.     COMPARISON:   Foot radiograph 09/03/2020.     FINDINGS:   Markedly motion limited examination with early termination secondary to reported patient pain.     No acute fracture, marrow replacement process, or evidence of osteomyelitis. Healed 5th metatarsal fracture.  Mild soft tissue edema involving the plantar aspect of the forefoot.  No evidence of effusion or synovitis.  The visualized extensor tendons are unremarkable.    Impression:       Markedly motion limited examination with early termination secondary to patient pain.  No evidence of osteomyelitis involving the right forefoot.     Electronically signed by resident: Isreal Del Rosario   Date: 09/05/2020   Time: 13:11     Electronically signed by: Abelardo Paris MD   Date: 09/05/2020   Time: 13:22   VAS Ankle Brachial Indices Resting [[id number]] Collected: 09/04/20 1224   Order Status: Completed Updated: 09/04/20 1617   Narrative:       Indication    ========     -Peripheral Arterial Disease     Pressure Lower   ============     Rt brachial A syst BP  154 mmHg   Rt PTA BP  58 mmHg   Rt dors pedis A BP 53 mmHg   Rt toe BP  74 mmHg   Right EVELIA ratio use:   post. tibial   Rt EVELIA post tibial (rt post tib A BP / max brach A BP) 0.38   Rt EVELIA (rt dors ped A BP / max brach A BP) 0.34   Rt ankle BP / max brach A BP   0.38   Rt TBI (rt toe BP / max brach A BP)    0.48   Lt PTA BP  122 mmHg   Lt dors pedis A  mmHg   Lt toe BP  135 mmHg   Lt EVELIA (lt post tibial A BP / max brach A BP)  0.79   Left EVELIA ratio use:    dors. pedis   Lt EVELIA dors ped (lt dors ped A BP / max brach A BP)    0.84   Lt ankle BP / max brach A BP   0.84   Lt TBI (lt toe BP / max brach A BP)    0.88     PPG Lower   =========     Rt toe BP - PPG    74 mmHg   Rt toe digit waveform: severely dampened   Lt toe BP - PPG    135 mmHg   Lt toe digit waveform: normal     Impression   =========     Right Leg: Segmental pressures and PVR waveforms suggest moderate to severe infrapopliteal peripheral arterial occlusive disease.     - Rt Great Toe: The PPG waveform as described above. - TBI of 0.48 with a toe pressure of 74 mmHg is noted.     Left Leg: Segmental pressures and PVR waveforms suggest minimal to mild peripheral arterial occlusive disease.   - Lt Great Toe: The PPG waveform as described above. - TBI of 0.88 with a toe pressure of 135 mmHg is noted.       DATE OF SERVICE: 09/04/2020                                                        Sonographer: Grazyna Willard   Electronically Signed by: Ryan Barrios M.D. at 09/04/2020-16:15   VAS US Arterial Leg Right [[id number]] Collected: 09/04/20 1341   Order Status: Completed Updated: 09/04/20 1617   Narrative:       Indication   ========     -Peripheral Arterial Disease     Lower Extremity Arterial Imaging   ========================     Right Lower Extremity   Rt mid CFA  cm/s   Rt prox DFA PSV    114 cm/s   Rt prox SFA PSV    117 cm/s   Rt  mid SFA  cm/s   Rt distal SFA PSV  91 cm/s   Rt prox POP PSV    64 cm/s   Rt mid POP PSV 47 cm/s   Rt distal POP PSV  53 cm/s   Rt mid PTA PSV 29 cm/s   Other: - Rt Dist SFA (just beyond prominent collateral) -100 cm/s     Comment   ========     -Limited imaging. Calcifications are noted throughout the entire arterial tree, which denies complete visualization of the vessels, along   with patient tenderness and edema.     Impression   =========     Right Leg: Duplex ultrasound imaging reveals no flow identified by color or spectral doppler within the ELI, suggestive of a possible   occlusion. Flow within the CFA, DFA, and SFA appear patent without evidence of a significant stenosis within segments evaluated. A   prominent collateral is visualized at the Dist SFA level. Dampened monophasic flow is noted within the popliteal artery extending into   the PTA. However, this finding might suggest a more Distal SFA/Prox Popliteal stenosis.       DATE OF SERVICE: 09/04/2020                                                        Sonographer: Grazyna Willard   Electronically Signed by: Ryan Barrios M.D. at 09/04/2020-16:14   X-Ray Foot Complete Right [[id number]] Resulted: 09/03/20 1650   Order Status: Completed Updated: 09/03/20 1652   Narrative:     EXAMINATION:   XR FOOT COMPLETE 3 VIEW RIGHT     CLINICAL HISTORY:   Pain in unspecified foot     TECHNIQUE:   AP, lateral, and oblique views of the right foot were performed.     COMPARISON:   02/27/2018.     FINDINGS:   There is diffuse demineralization of the osseous structures.  There is a healed fracture involving the head of the right 5th metatarsal.  No new fracture is identified.     There is joint space narrowing throughout the right foot.  The Lisfranc articulation appears intact.     There is diffuse soft tissue swelling throughout the right foot.  Vascular calcifications are present.  No radiopaque foreign body is identified.    Impression:       Healed  fracture of the right 5th metatarsal.     Diffuse soft tissue swelling.  Additional evaluation, as clinically warranted.       Electronically signed by: Nicolás Ha MD   Date: 09/03/2020   Time: 16:50   Imaging History    2020  Date Procedure Name Status Accession Number Location   09/05/20 04:35 PM US Lower Extremity Veins Right Final [id number] [facility]   09/05/20 08:14 AM CTA Runoff ABD PEL Bilat Lower Ext Final [id number] [facility]   09/04/20 06:34 PM MRI Foot (Forefoot) Right Without Contrast Final [id number] [facility]   09/04/20 06:32 AM VAS US Arterial Leg Right Final [id number]    09/03/20 07:02 PM VAS Ankle Brachial Indices Resting Final [id number]    09/03/20 04:41 PM X-Ray Foot Complete Right Final [id number] [facility]

## 2020-09-06 NOTE — PLAN OF CARE
CM received a call from Medicine provider. Pt medically ready to discharge today with IV abx and HH. CM reviewed orders and requested clarification on meds with HD vs meds at Home. Dr. Dinero and team stopped by CM desk. Pt will discharge with IV abx on HD days.     CM called patient to clarify [place] schedule.  [facility] [place]   [address] [place], [place] [address]  [phone] (Center Phone)  [phone] (Center Fax)    CM updated patient that her IV ABX needed to be administered on HD days, but since the clinic is closed today, we cannot verify that the center has the medicine, or verify that they will be able to order the medicine. Pt expressed that her ride is en route to the hospital to pick her up and can we just follow through tomorrow. CM explained to the patient that we will follow through tomorrow, but we cannot guarantee that they will have the medication she needs without making direct contact with them. CM updated the providers that this has a high likely hector of not being a safe discharge plan, since we cannot verify the medication availability. Provider will speak with the patient about the discharge plan.     Shaye Smith RN Case Manager   #[phone]

## 2020-09-06 NOTE — PROGRESS NOTES
Pharmacokinetic Initial Assessment: IV Vancomycin    Assessment/Plan:    Infectious Diseases initiated vancomycin 1g x 1 dose for coverage of a patient with a diabetic foot and severe PAD. Patient is on MWF HD and last HD session on 9/4. Random level has been ordered for tomorrow AM on 9/7 (goal 15-20). Plan to redose after next HD session tomorrow depending on level prior to discharge    Please contact pharmacy at extension [phone] with any questions regarding this assessment.     Thank you for the consult,   Ramy Perez       Patient brief summary:  Kylie King is a 64 y.o. female initiated on antimicrobial therapy with IV Vancomycin for treatment of suspected skin & soft tissue infection    Drug Allergies:   Review of patient's allergies indicates:   Allergen Reactions    Cyclobenzaprine Hallucinations    Erythromycin      Stomach upset       Actual Body Weight:   125 kg    Renal Function:   Estimated Creatinine Clearance: 10.9 mL/min (A) (based on SCr of 6.7 mg/dL (H)).,     Dialysis Method (if applicable):  intermittent HD MWF, last HD 9/4, next HD 9/7    CBC (last 72 hours):  Recent Labs   Lab Result Units 09/03/20 1619 09/04/20 0415 09/05/20 0435 09/06/20  0440   WBC K/uL 8.86 9.07 9.87 9.37   Hemoglobin g/dL 10.5* 10.4* 11.7* 9.5*   Hemoglobin A1C % 8.8*  --   --   --    Hematocrit % 33.6* 34.8* 36.9* 31.5*   Platelets K/uL 304 321 168 274   Gran% % 72.1 66.2 72.2 70.2   Lymph% % 16.6* 20.6 17.4* 18.4   Mono% % 8.8 9.3 8.3 8.6   Eosinophil% % 0.9 1.4 0.9 1.4   Basophil% % 0.5 0.8 0.5 0.5   Differential Method  Automated Automated Automated Automated       Metabolic Panel (last 72 hours):  Recent Labs   Lab Result Units 09/03/20 1619 09/04/20 0415 09/05/20 0435 09/06/20  0440   Sodium mmol/L 130* 129* 128* 132*   Potassium mmol/L 4.9 5.3* 5.3* 5.2*   Chloride mmol/L 92* 91* 95 96   CO2 mmol/L 24 23 21* 20*   Glucose mg/dL 331* 240* 258* 209*   BUN, Bld mg/dL 38* 46* 31* 42*   Creatinine mg/dL 7.1*  7.9* 6.2* 6.7*   Albumin g/dL 3.5 3.4* 3.3* 3.2*   Total Bilirubin mg/dL 0.4 0.4 0.4 0.4   Alkaline Phosphatase U/L 66 59 61 67   AST U/L 10 11 22 12   ALT U/L 18 15 19 21   Magnesium mg/dL 1.9 1.9 2.0 2.0   Phosphorus mg/dL 5.4* 6.3* 4.9* 5.5*       Drug levels (last 3 results):  No results for input(s): VANCOMYCINRA, VANCOMYCINPE, VANCOMYCINTR in the last 72 hours.    Microbiologic Results:  Microbiology Results (last 7 days)     Procedure Component Value Units Date/Time    Blood culture (site 1) [[id number]] Collected: 09/03/20 2243    Order Status: Completed Specimen: Blood Updated: 09/06/20 0613     Blood Culture, Routine No Growth to date      No Growth to date      No Growth to date    Narrative:      Site # 1, aerobic and anaerobic    Blood culture (site 2) [[id number]] Collected: 09/03/20 2243    Order Status: Completed Specimen: Blood Updated: 09/06/20 0613     Blood Culture, Routine No Growth to date      No Growth to date      No Growth to date    Narrative:      Site # 2, aerobic only

## 2020-09-06 NOTE — ASSESSMENT & PLAN NOTE
Lab Results   Component Value Date    HGBA1C 8.8 (H) 09/03/2020     Home meds: levemir 45U BID, Novolog 15U TIDWM    Plan:  - HbA1c ordered  - Detemir 12U qhs -> 20u BID -> 14u BID  - Aspart 4 TIDWM -> 7u TIDWM -> 10u TIDWM  - MDSS  - POCT glucose ACHS  - Diabetic diet

## 2020-09-06 NOTE — PLAN OF CARE
CM followed-up on  referrals. Pt accepted by [facility]. They will setup admit appt with pt. CM updated treatment team.    Edelmira Erwin RN   - [facility]  [phone]

## 2020-09-06 NOTE — ASSESSMENT & PLAN NOTE
Pt is a 65 yo F with T2DM, ESRD on HD (MWF), HFpEF (EF 50-55%), HTN, HLD, COPD, T2DM, chronic venous insufficiency, and anxiety/depression that presented from Podiatry clinic acute worsening R foot wound.  Patient has chronic venous insufficiency and T2DM with poor foot hygiene. Pt stable. Elevated inflammatory markers. No leukocytosis. XR foot showed diffuse soft tissue swelling, healed fracture of R 5th metatarsal. Vasc Sx consulted in ED, recommended [facility] admission + Podiatry consult, but they anticipate future amputation. Vasc sx ordered studies indicating very poor blood flow to RLE .  MRI results without osteomyeltitis    No acute sx intervention by Vasc sx- patient to be dc'd and return for intervention on 9/9, no scheduled podiatry sx as of yet. Areas of foot appear wet.  THough there is no osteo on MRI I am concerned she is a ripe set up for infection and may have a slight degree of cellulitis vs vasc skin change.  In the setting of such poor blood flow suspect IV abx most appropriate and uncertain if/when surgery to take place.  If patient develops worsening infection suspect it would progress rapidly given health status and sever PVD so attempting to temporize this.     Plan  - start vanc and cefepime post HD - 1st dose today prior to dc  - reconsult ID on 9/9 when back for vasc intervention  -when surgical intervention performed, please obtain dirty and clean margin for culture (aerobic, anaerobic, afb and fungal) and path.  - recs communicated to primary team    Infection: Gangrene R toe with associated RLE cellulitis    Discharge antibiotics:   1. Vancomycin 1g IV x1 post HD on HD days  2. Cefepime 2g IV x1 post HD on HD day    End date of IV antibiotics: tbd post sx    Weekly outpatient laboratory on Monday or Tuesday while on IV antibiotics.    CBC   CMP    ESR and CRP  Vancomycin trough. Target 15-20 - prior to HD on Mondays    If vancomycin trough is not at target (15-20) prior to discharge, the  please perform vancomycin trough before their fourth outpatient dose.    Fax laboratory results to [facility] at [phone] with attn: Jose Sherman PA-C [facility]    Outpatient Infectious Diseases clinic follow up will be arranged and found in patient calendar.    Prior to discharge, please ensure the patient's follow-up has been scheduled.  If there is still no follow-up scheduled in Infectious Diseases clinic, please send an EPIC message to Annemarie Ramos in Infectious Diseases.

## 2020-09-06 NOTE — ASSESSMENT & PLAN NOTE
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 HD stable. Afebrile, no leukocytosis. XR foot showed diffuse soft tissue swelling, healed fracture of R 5th metatarsal. Received vanc in ED. Vasc Sx consulted in ED, recommended [facility] admission + Podiatry consult, but they anticipate future intervention.     Vascular Surgery consulted. Reduced R TBI. CTA reviewed and plan angiography with intervention Wednesday 9/9/20. Can be outpatient  Podiatry consulted. MRI without osteomyelitis. Per recs will obtain RLE u/s to rule out dvt for calf pain. C/w betadiene    Plan:  - Vasc Sx, Podiatry, and ID consulted, recs appreciated  - Vanc and Cefepime initiated today. Will be given after dialysis on discharge.   -Discharge antibiotics:    -Vancomycin 1g IV x1 post HD on HD days     -Cefepime 2g IV x1 post HD on HD day   - BCx NGTD  - ID consulted

## 2020-09-06 NOTE — PLAN OF CARE
[facility]    HOME HEALTH ORDERS  FACE TO FACE ENCOUNTER    Patient Name: Kylie King  YOB: 1955    PCP: Virginia Foote MD   PCP Address: [address] / [place] [address]  PCP Phone Number: [phone]  PCP Fax: [phone]    Encounter Date: 09/06/2020    Admit to Home Health    Diagnoses:  Active Hospital Problems    Diagnosis  POA    *Gangrene of right foot [I96]  Yes    Toe gangrene [I96]  Unknown    PVD (peripheral vascular disease) [I73.9]  Unknown    Tobacco use [Z72.0]  Yes    Depression [F32.9]  Yes    Chronic obstructive pulmonary disease [J44.9]  Yes     4/2018: Diagnosed.  Began home O2.      History of colon cancer [Z85.038]  Yes     2000: Partial colectomy.      ESRD (end stage renal disease) on dialysis [N18.6, Z99.2]  Not Applicable     4/2018: Began HD.      Heart failure, diastolic, chronic [I50.32]  Yes     4/20/2018: Echo: Normal left ventricular size with low normal systolic function. EF 50-55%. Mild LVH. Moderate diastolic dysfunction.      Debility [R53.81]  Yes    Morbid obesity [E66.01]  Yes     6/8/2018: Weight 119 kg. BMI 44.      Lumbar stenosis [M48.061]  Yes    History of breast cancer [Z85.3]  Not Applicable     2013: Left mastectomy.      Type 2 diabetes mellitus [E11.9]  Yes     2007: Diagnosed. Complications: CVA & ESRD. Medications: Insulin.      Essential hypertension [I10]  Yes     1999: Diagnosed.      Hypercholesterolemia [E78.00]  Yes     2016: Began statin.        Resolved Hospital Problems   No resolved problems to display.       Future Appointments   Date Time Provider Department Center   9/7/2020  6:45 AM DIALYSIS, [facility]   10/8/2020 10:00 AM Virginia Foote MD [facility]           I have seen and examined this patient face to face today. My clinical findings that support the need for the home health skilled services and home bound status are the  following:  Weakness/numbness causing balance and gait disturbance due to Infection making it taxing to leave home.  Requiring assistive device to leave home due to unsteady gait caused by  Infection.    Allergies:  Review of patient's allergies indicates:   Allergen Reactions    Cyclobenzaprine Hallucinations    Erythromycin      Stomach upset       Diet: cardiac diet, diabetic diet: 2000 calorie and renal diet    Activities: activity as tolerated    Nursing:   SN to complete comprehensive assessment including routine vital signs. Instruct on disease process and s/s of complications to report to MD. Review/verify medication list sent home with the patient at time of discharge  and instruct patient/caregiver as needed. Frequency may be adjusted depending on start of care date.    Notify MD if SBP > 160 or < 90; DBP > 90 or < 50; HR > 120 or < 50; Temp > 101;       CONSULTS:    Physical Therapy to evaluate and treat. Evaluate for home safety and equipment needs; Establish/upgrade home exercise program. Perform / instruct on therapeutic exercises, gait training, transfer training, and Range of Motion.  Occupational Therapy to evaluate and treat. Evaluate home environment for safety and equipment needs. Perform/Instruct on transfers, ADL training, ROM, and therapeutic exercises.  Aide to provide assistance with personal care, ADLs, and vital signs.    MISCELLANEOUS CARE:  Home Infusion Therapy:   SN to perform Infusion Therapy/Central Line Care.  Review Central Line Care & Central Line Flush with patient.    Administer (drug and dose): 1g Vancomycin daily and 2g Cefepime with dialysis (MWF)  Last dose given: 9/6/20                       Home dose due: 9/7/20          Scrub the Hub: Prior to accessing the line, always perform a 30 second alcohol scrub  Each lumen of the central line is to be flushed at least daily with 10 mL Normal Saline and 3 mL Heparin flush (10 units/mL)  Skilled Nurse (SN) may draw blood from IV  access  Blood Draw Procedure:   - Aspirate at least 5 mL of blood   - Discard   - Obtain specimen   - Change injection cap   - Flush with 20 mL Normal Saline followed by a                 3-5 mL Heparin flush (10 units/mL)  Central :   - Sterile dressing changes are done weekly and as needed.   - Use chlor-hexadine scrub to cleanse site, apply Biopatch to insertion site,       apply securement device dressing   - Injection caps are changed weekly and after EVERY lab draw.   - If sterile gauze is under dressing to control oozing,                 dressing change must be performed every 24 hours until gauze is not needed.    Weekly Labs to be drawn :  Weekly outpatient laboratory on Monday or Tuesday while on IV antibiotics. CBC CMP ESR and CRP Vancomycin trough. Target 15-20 - prior to HD on Mondays.     If vancomycin trough is not at target (15-20) prior to discharge, the please perform vancomycin trough before their fourth outpatient dose.     · Fax laboratory results to [facility] at [phone] with attn: Jose Sherman PA-C [facility]       WOUND CARE ORDERS  yes:  Foot Ulcer:  Location: R 1st Hallux     Discharge Instructions: Patient to followup with podiatry outpatient within 10 days of  discharge, podiatry to arrange. Home health to apply betadine paint to all foot wounds or  necrotic areas twice daily, no need to cover with dressings. Patient to use bilateral postop  shoes at all times ambulating.          Medications: Review discharge medications with patient and family and provide education.      Current Discharge Medication List      CONTINUE these medications which have NOT CHANGED    Details   amLODIPine (NORVASC) 10 MG tablet Take 1 tablet (10 mg total) by mouth every morning.  Qty: 90 tablet, Refills: 3      atorvastatin (LIPITOR) 40 MG tablet Take 1 tablet (40 mg total) by mouth once daily.  Qty: 90 tablet, Refills: 3      carvediloL (COREG) 25 MG tablet Take 1 tablet (25 mg total) by  mouth 2 (two) times daily.  Qty: 180 tablet, Refills: 3    Associated Diagnoses: Essential hypertension      citalopram (CELEXA) 20 MG tablet Take 1 tablet (20 mg total) by mouth nightly.  Qty: 90 tablet, Refills: 3    Associated Diagnoses: Depression, unspecified depression type      famotidine (PEPCID) 20 MG tablet Take 1 tablet (20 mg total) by mouth nightly as needed.  Qty: 90 tablet, Refills: 3      gabapentin (NEURONTIN) 400 MG capsule Take 1 capsule (400 mg total) by mouth every evening.  Qty: 90 capsule, Refills: 3    Associated Diagnoses: Tethered cord; Spondylosis without myelopathy; DDD (degenerative disc disease), lumbosacral; Spinal stenosis of lumbar region with neurogenic claudication; Thoracic and lumbosacral neuritis; Acquired spondylolisthesis; Bilateral low back pain without sciatica, unspecified chronicity      hydrALAZINE (APRESOLINE) 25 MG tablet Take 1 tablet (25 mg total) by mouth 2 (two) times daily.  Qty: 180 tablet, Refills: 3    Comments: .      insulin detemir U-100 (LEVEMIR FLEXTOUCH U-100 INSULN) 100 unit/mL (3 mL) InPn pen Inject 45 Units into the skin 2 (two) times daily.  Qty: 81 mL, Refills: 3      !! insulin lispro (HUMALOG KWIKPEN INSULIN) 100 unit/mL pen BLOOD GLUCOSE 150-200, INJECT 1 UNITS UNDER THE SKIN, 201-250, 2 UNITS, 251-300, 3 UNITS THREE TIMES A DAY AS DIRECTED  Qty: 1 Box, Refills: 3      !! insulin lispro (HUMALOG KWIKPEN INSULIN) 100 unit/mL pen BLOOD GLUCOSE 150-200, INJECT 15 UNITS UNDER THE SKIN, 201-250, 18 UNITS, 251-300, 20 UNITS THREE TIMES A DAY AS DIRECTED  Qty: 45 mL, Refills: 3      letrozole (FEMARA) 2.5 mg Tab Take 1 tablet (2.5 mg total) by mouth nightly.  Qty: 90 tablet, Refills: 3      acetaminophen (TYLENOL) 325 MG tablet Take 650 mg by mouth every 6 (six) hours as needed for mild pain 1-3/10 pain scale.      acetaminophen-codeine 300-30mg (TYLENOL #3) 300-30 mg Tab TAKE 1 TABLET BY MOUTH EVERY 6 HOURS AS NEEDED  Qty: 90 tablet, Refills: 0     "Comments: Medication supply greater than 7 days is medically necessary.  Associated Diagnoses: Spinal stenosis of lumbar region with neurogenic claudication      albuterol (PROVENTIL/VENTOLIN HFA) 90 mcg/actuation inhaler Inhale 2 puffs into the lungs every 6 (six) hours as needed for Wheezing. Rescue  Qty: 18 g, Refills: 3      ammonium lactate 12 % Crea Apply twice daily to affected parts both feet as needed.  Qty: 140 g, Refills: 11      BD INSULIN PEN NEEDLE UF MINI 31 gauge x 3/16" Ndle USE 5 NEEDLES PER DAY  Qty: 450 each, Refills: 2      diclofenac sodium (VOLTAREN) 1 % Gel Apply 2 grams topically 4 (four) times daily.  Qty: 100 g, Refills: 3      fish oil-omega-3 fatty acids 300-1,000 mg capsule Take 1 capsule by mouth every morning.      fluticasone (FLONASE) 50 mcg/actuation nasal spray 2 sprays (100 mcg total) by Each Nare route once daily.  Qty: 1 Bottle, Refills: 2      insulin detemir U-100 (LEVEMIR) 100 unit/mL injection Inject 45 Units into the skin 2 (two) times daily.      !! insulin lispro (HUMALOG KWIKPEN INSULIN) 100 unit/mL pen BLOOD GLUCOSE 150-200, INJECT 1 UNITS UNDER THE SKIN, 201-250, 2 UNITS, 251-300, 3 UNITS THREE TIMES A DAY AS DIRECTED      insulin needles, disposable, (PEN NEEDLE, DIABETIC) 31 Ndle Patient needs 5 needles a day  Qty: 500 each, Refills: 3      iron polysacch complex-b12-fa 150-25-1 mg-mcg-mg (FERREX 150 FORTE) 150-25-1 mg-mcg-mg Cap Take 150 mg by mouth once daily.      ketoconazole (NIZORAL) 2 % cream Apply topically once daily.  Qty: 1 Tube, Refills: 2      lancets (ONETOUCH DELICA LANCETS) 33 gauge Misc 1 lancet by Misc.(Non-Drug; Combo Route) route 4 (four) times daily before meals and nightly.  Qty: 400 each, Refills: 4      pen needle, diabetic 32 gauge x 5/32" Ndle 1 Device by Misc.(Non-Drug; Combo Route) route 5 (five) times daily.  Qty: 450 each, Refills: 6       !! - Potential duplicate medications found. Please discuss with provider.          I certify that " this patient is confined to her home and needs intermittent skilled nursing care, physical therapy and occupational therapy.

## 2020-09-07 VITALS
HEART RATE: 90 BPM | RESPIRATION RATE: 18 BRPM | OXYGEN SATURATION: 95 % | BODY MASS INDEX: 47.04 KG/M2 | WEIGHT: 275.56 LBS | SYSTOLIC BLOOD PRESSURE: 132 MMHG | TEMPERATURE: 99 F | DIASTOLIC BLOOD PRESSURE: 81 MMHG | HEIGHT: 64 IN

## 2020-09-07 LAB
ALBUMIN SERPL BCP-MCNC: 3.4 G/DL (ref 3.5–5.2)
ALP SERPL-CCNC: 64 U/L (ref 55–135)
ALT SERPL W/O P-5'-P-CCNC: 22 U/L (ref 10–44)
ANION GAP SERPL CALC-SCNC: 22 MMOL/L (ref 8–16)
AST SERPL-CCNC: 26 U/L (ref 10–40)
BASOPHILS # BLD AUTO: 0.06 K/UL (ref 0–0.2)
BASOPHILS NFR BLD: 0.5 % (ref 0–1.9)
BILIRUB SERPL-MCNC: 0.5 MG/DL (ref 0.1–1)
BUN SERPL-MCNC: 64 MG/DL (ref 8–23)
CALCIUM SERPL-MCNC: 8.9 MG/DL (ref 8.7–10.5)
CHLORIDE SERPL-SCNC: 94 MMOL/L (ref 95–110)
CO2 SERPL-SCNC: 11 MMOL/L (ref 23–29)
CREAT SERPL-MCNC: 8.5 MG/DL (ref 0.5–1.4)
DIFFERENTIAL METHOD: ABNORMAL
EOSINOPHIL # BLD AUTO: 0.2 K/UL (ref 0–0.5)
EOSINOPHIL NFR BLD: 1.7 % (ref 0–8)
ERYTHROCYTE [DISTWIDTH] IN BLOOD BY AUTOMATED COUNT: 19.9 % (ref 11.5–14.5)
EST. GFR  (AFRICAN AMERICAN): 5.1 ML/MIN/1.73 M^2
EST. GFR  (NON AFRICAN AMERICAN): 4.5 ML/MIN/1.73 M^2
GLUCOSE SERPL-MCNC: 156 MG/DL (ref 70–110)
HCT VFR BLD AUTO: 34.1 % (ref 37–48.5)
HGB BLD-MCNC: 10.2 G/DL (ref 12–16)
IMM GRANULOCYTES # BLD AUTO: 0.11 K/UL (ref 0–0.04)
IMM GRANULOCYTES NFR BLD AUTO: 1 % (ref 0–0.5)
LYMPHOCYTES # BLD AUTO: 1.9 K/UL (ref 1–4.8)
LYMPHOCYTES NFR BLD: 16.5 % (ref 18–48)
MAGNESIUM SERPL-MCNC: 2 MG/DL (ref 1.6–2.6)
MCH RBC QN AUTO: 29.2 PG (ref 27–31)
MCHC RBC AUTO-ENTMCNC: 29.9 G/DL (ref 32–36)
MCV RBC AUTO: 98 FL (ref 82–98)
MONOCYTES # BLD AUTO: 0.9 K/UL (ref 0.3–1)
MONOCYTES NFR BLD: 8.3 % (ref 4–15)
NEUTROPHILS # BLD AUTO: 8.1 K/UL (ref 1.8–7.7)
NEUTROPHILS NFR BLD: 72 % (ref 38–73)
NRBC BLD-RTO: 0 /100 WBC
PHOSPHATE SERPL-MCNC: 7.4 MG/DL (ref 2.7–4.5)
PLATELET # BLD AUTO: 310 K/UL (ref 150–350)
PMV BLD AUTO: ABNORMAL FL (ref 9.2–12.9)
POCT GLUCOSE: 145 MG/DL (ref 70–110)
POCT GLUCOSE: 186 MG/DL (ref 70–110)
POCT GLUCOSE: 210 MG/DL (ref 70–110)
POCT GLUCOSE: 233 MG/DL (ref 70–110)
POTASSIUM SERPL-SCNC: 6.3 MMOL/L (ref 3.5–5.1)
PROT SERPL-MCNC: 7.8 G/DL (ref 6–8.4)
RBC # BLD AUTO: 3.49 M/UL (ref 4–5.4)
SODIUM SERPL-SCNC: 127 MMOL/L (ref 136–145)
VANCOMYCIN TROUGH SERPL-MCNC: 18.2 UG/ML (ref 10–22)
WBC # BLD AUTO: 11.29 K/UL (ref 3.9–12.7)

## 2020-09-07 PROCEDURE — 84100 ASSAY OF PHOSPHORUS: CPT

## 2020-09-07 PROCEDURE — 80100016 HC MAINTENANCE HEMODIALYSIS

## 2020-09-07 PROCEDURE — 99238 HOSP IP/OBS DSCHRG MGMT 30/<: CPT | Mod: GC,,, | Performed by: HOSPITALIST

## 2020-09-07 PROCEDURE — 80053 COMPREHEN METABOLIC PANEL: CPT

## 2020-09-07 PROCEDURE — 85025 COMPLETE CBC W/AUTO DIFF WBC: CPT

## 2020-09-07 PROCEDURE — 25000003 PHARM REV CODE 250: Performed by: STUDENT IN AN ORGANIZED HEALTH CARE EDUCATION/TRAINING PROGRAM

## 2020-09-07 PROCEDURE — 63600175 PHARM REV CODE 636 W HCPCS: Performed by: HOSPITALIST

## 2020-09-07 PROCEDURE — 83735 ASSAY OF MAGNESIUM: CPT

## 2020-09-07 PROCEDURE — 99238 PR HOSPITAL DISCHARGE DAY,<30 MIN: ICD-10-PCS | Mod: GC,,, | Performed by: HOSPITALIST

## 2020-09-07 PROCEDURE — 25000003 PHARM REV CODE 250: Performed by: HOSPITALIST

## 2020-09-07 PROCEDURE — 80202 ASSAY OF VANCOMYCIN: CPT

## 2020-09-07 PROCEDURE — 36415 COLL VENOUS BLD VENIPUNCTURE: CPT

## 2020-09-07 RX ORDER — VANCOMYCIN HCL IN 5 % DEXTROSE 1G/250ML
1000 PLASTIC BAG, INJECTION (ML) INTRAVENOUS ONCE
Status: COMPLETED | OUTPATIENT
Start: 2020-09-07 | End: 2020-09-07

## 2020-09-07 RX ADMIN — VANCOMYCIN HYDROCHLORIDE 1000 MG: 1 INJECTION, POWDER, LYOPHILIZED, FOR SOLUTION INTRAVENOUS at 12:09

## 2020-09-07 RX ADMIN — INSULIN ASPART 10 UNITS: 100 INJECTION, SOLUTION INTRAVENOUS; SUBCUTANEOUS at 12:09

## 2020-09-07 RX ADMIN — ATORVASTATIN CALCIUM 40 MG: 20 TABLET, FILM COATED ORAL at 12:09

## 2020-09-07 RX ADMIN — AMLODIPINE BESYLATE 10 MG: 10 TABLET ORAL at 06:09

## 2020-09-07 RX ADMIN — HYDRALAZINE HYDROCHLORIDE 25 MG: 25 TABLET, FILM COATED ORAL at 12:09

## 2020-09-07 RX ADMIN — CARVEDILOL 25 MG: 25 TABLET, FILM COATED ORAL at 12:09

## 2020-09-07 NOTE — PLAN OF CARE
Plan of care discussed with patient. Patient is free of fall/trauma/injury. Denies CP, SOB, or pain/discomfort. Patient had 2 L removed during dialysis treatment this AM. Patient scheduled to be D/C this evening following arrangement of antibiotics with her dialysis center. All questions addressed. Will continue to monitor

## 2020-09-07 NOTE — PLAN OF CARE
Dr. Becerra authorized for patient to roll around in wheelchair in the hospital. Education provided on importance of dietary selections made that affect BG. Patient educated that without control of BG, poor wound healing will occur, which will affect her infection of right foot. Wound care provided as ordered. NSR on monitor with HR in 70-80s. d/c tomorrow 09/07 with plans of angiogram outpatient. VSS, AOX4, no complaints of N/V. POC reviewed with pt and pt verbalizes understanding. Will continue to monitor.

## 2020-09-07 NOTE — PLAN OF CARE
Patient alert and responsive to nursing care. Vital Signs WNL. C/o of pain and discomfort.  Gave Tylenol 300mg/codeine 30 mg (Tyelnol #3) tablet for pain management.  No further c/o of pain or discomfort after giving the medication.  Also c/o nausea.  Gave promethazine 25 mg for nausea.  No further c/o nausea after giving the medication.  No cardiopulmonary distress. PIV patent and saline locked. AV shunt on right arm positive thrill and bruit. Wound care done on right medial foot.  Ambulating with walker.   Medication given per MD order. Planned of care review and patient education provided.   Comfort and safety measure maintained.  Call light and bedside table within reach.  Walker within reach. Will continue to monitor for changes of condition.

## 2020-09-07 NOTE — PROGRESS NOTES
HD treatment complete. Treatment duration of 3.5 hours and 3 L removed. Treatment was tolerated well and no complications with access to right lower arm. Needles removed and hemostasis achieved. Dressing intact and no drainage noted. Thrill and bruit present.

## 2020-09-07 NOTE — NURSING
Patient is ready for discharge. Patient stable alert and oriented. IVs removed. No complaints of pain. Discussed discharge plan. Reviewed medications and side effects, appointments, and answered questions with patient and family.  RX given to patient to be filled at local pharmacy.

## 2020-09-07 NOTE — NURSING
Received report from lab of critical potassium level of 6.3. Informed MD Franki of IM1 of the critical lab level. Informed them patient was currently down in dialysis. No orders given to nurse at this time. Will continue to monitor.

## 2020-09-07 NOTE — PROGRESS NOTES
HD treatment started. No complications with access to right lower arm. Lines secured and telemetry in place. No complaints of discomfort at this time.

## 2020-09-07 NOTE — PROGRESS NOTES
Pharmacokinetic Assessment Follow Up: IV Vancomycin    Vancomycin serum concentration assessment(s):    The random level was drawn correctly and can be used to guide therapy at this time. The measurement is within the desired definitive target range of 15 to 20 mcg/mL.    Vancomycin Regimen Plan:  Redose with 1 gm Vancomycin after Hemodialysis before discharge today    Drug levels (last 3 results):  Recent Labs   Lab Result Units 09/07/20  0835   Vancomycin-Trough ug/mL 18.2       Pharmacy will continue to follow and monitor vancomycin.    Please contact pharmacy at extension [phone] for questions regarding this assessment.    Thank you for the consult,   Darío Baez       Patient brief summary:  Kylie King is a 64 y.o. female initiated on antimicrobial therapy with IV Vancomycin for treatment of skin & soft tissue infection (diabetic foot and severe PAD)    Drug Allergies:   Review of patient's allergies indicates:   Allergen Reactions    Cyclobenzaprine Hallucinations    Erythromycin      Stomach upset       Actual Body Weight:   125 kg    Renal Function:   Estimated Creatinine Clearance: 8.6 mL/min (A) (based on SCr of 8.5 mg/dL (H)).,     Dialysis Method (if applicable):  intermittent HD    CBC (last 72 hours):  Recent Labs   Lab Result Units 09/05/20 0435 09/06/20 0440 09/07/20  0503   WBC K/uL 9.87 9.37 11.29   Hemoglobin g/dL 11.7* 9.5* 10.2*   Hematocrit % 36.9* 31.5* 34.1*   Platelets K/uL 168 274  --    Gran% % 72.2 70.2  --    Lymph% % 17.4* 18.4  --    Mono% % 8.3 8.6  --    Eosinophil% % 0.9 1.4  --    Basophil% % 0.5 0.5  --    Differential Method  Automated Automated  --        Metabolic Panel (last 72 hours):  Recent Labs   Lab Result Units 09/05/20  0435 09/06/20  0440 09/07/20  0503   Sodium mmol/L 128* 132* 127*   Potassium mmol/L 5.3* 5.2* 6.3*   Chloride mmol/L 95 96 94*   CO2 mmol/L 21* 20* 11*   Glucose mg/dL 258* 209* 156*   BUN, Bld mg/dL 31* 42* 64*   Creatinine mg/dL 6.2* 6.7* 8.5*    Albumin g/dL 3.3* 3.2* 3.4*   Total Bilirubin mg/dL 0.4 0.4 0.5   Alkaline Phosphatase U/L 61 67 64   AST U/L 22 12 26   ALT U/L 19 21 22   Magnesium mg/dL 2.0 2.0 2.0   Phosphorus mg/dL 4.9* 5.5* 7.4*       Vancomycin Administrations:  vancomycin given in the last 96 hours                   vancomycin in dextrose 5 % 1 gram/250 mL IVPB 1,000 mg (mg) 1,000 mg New Bag 09/06/20 1037    vancomycin 1.75 g in 5 % dextrose 500 mL IVPB (mg) 1,750 mg New Bag 09/03/20 1833                Microbiologic Results:  Microbiology Results (last 7 days)     Procedure Component Value Units Date/Time    Blood culture (site 2) [[id number]] Collected: 09/03/20 2243    Order Status: Completed Specimen: Blood Updated: 09/07/20 0612     Blood Culture, Routine No Growth to date      No Growth to date      No Growth to date      No Growth to date    Narrative:      Site # 2, aerobic only    Blood culture (site 1) [[id number]] Collected: 09/03/20 2243    Order Status: Completed Specimen: Blood Updated: 09/07/20 0612     Blood Culture, Routine No Growth to date      No Growth to date      No Growth to date      No Growth to date    Narrative:      Site # 1, aerobic and anaerobic

## 2020-09-07 NOTE — PLAN OF CARE
CM contacted [facility] to confirm antibiotics availability. Vancomycin 1 g available but will not have Cefepime 2g until Friday.    CM notified team of alternate plan to avoid delaying discharge. Purchase the 1 dose for HD on Wed, however, pharmacy would not allow. CM contacted Sara at [facility] to obtain the cost of 1 dose of Cefepime. After multiple contacts with [facility], CM obtained cost of 1 dose ($10). CM gave verbal approval to proceed with purchase. Sara will email CM invoice. CM provided [facility] contact details (below) to deliver 1 dose Cefepime directly to facility prior to pt appt Wed.    [facility] (M/W/F)    [address] [place], [place] [address] [phone] (Center Phone)    [facility] could admit pt on 9/12. CM spoke with Dr. Doan to confirm if pt could wait, she agreed with 9/12 admit appt.    CM update treatment team on d/c plan status. Dr. Doan updating pt. CM will forward [facility] invoice to leadership.    Edelmira Erwin RN   - [facility]  [phone]

## 2020-09-08 ENCOUNTER — TELEPHONE (OUTPATIENT)
Dept: VASCULAR SURGERY | Facility: CLINIC | Age: 65
End: 2020-09-08

## 2020-09-08 NOTE — PLAN OF CARE
09/08/20 0643   Final Note   Assessment Type Final Discharge Note   Anticipated Discharge Disposition IV Therapy   Hospital Follow Up  Appt(s) scheduled? Yes

## 2020-09-08 NOTE — ASSESSMENT & PLAN NOTE
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 HD stable. Afebrile, no leukocytosis. XR foot showed diffuse soft tissue swelling, healed fracture of R 5th metatarsal. Received vanc in ED. Vasc Sx consulted in ED, recommended HM admission + Podiatry consult, but they anticipate future intervention.     Vascular Surgery consulted. Reduced R TBI. CTA reviewed and plan angiography with intervention Wednesday 9/9/20. Can be outpatient  Podiatry consulted. MRI without osteomyelitis. Per recs will obtain RLE u/s to rule out dvt for calf pain. C/w betadiene    Plan:  - Vasc Sx, Podiatry, and ID consulted, recs appreciated  - Vanc and Cefepime initiated today. Will be given after dialysis on discharge:   -Vancomycin 1g IV x1 post HD on HD days   -Cefepime 2g IV x1 post HD on HD days.  - BCx NGTD  - ID onboard.  - CTA completed.   - Vascular Surgery planning outpatient angiogram with intervention on 9/9.   - Wound care per Podiatry recs.   - CM arranged ABx with HD clinic (closed yesterday).

## 2020-09-08 NOTE — TELEPHONE ENCOUNTER
Contacted patient's family member Henry with time of arrival for procedure with Dr. Pollard tomorrow (unable to contact patient). Henry states he is not aware of procedure with Dr. Pollard and that patient will not be able to report to [facility] for procedure as she has HD on Wednesdays. States patient will need to reschedule procedure and will need a date on a Tuesday or Thursday. Will contact Dr. Pollard to notify.

## 2020-09-08 NOTE — SUBJECTIVE & OBJECTIVE
Interval History: No acute events overnight.    Review of Systems   Constitutional: Negative for activity change, chills, fatigue, fever and unexpected weight change.   HENT: Negative for congestion.    Eyes: Negative for visual disturbance.   Respiratory: Negative for cough, choking, chest tightness, shortness of breath, wheezing and stridor.    Cardiovascular: Negative for chest pain, palpitations and leg swelling.   Gastrointestinal: Negative for abdominal distention, abdominal pain, blood in stool, constipation, diarrhea, nausea and vomiting.   Endocrine: Negative for polyuria.   Genitourinary: Negative for difficulty urinating, dysuria, flank pain, frequency, urgency and vaginal discharge.   Musculoskeletal: Negative for arthralgias and gait problem.   Skin: Positive for wound. Negative for pallor and rash.   Neurological: Negative for dizziness, tremors, seizures, facial asymmetry, weakness, numbness and headaches.   Psychiatric/Behavioral: Positive for sleep disturbance (2/2 discomfort). Negative for agitation, behavioral problems, confusion and decreased concentration.     Objective:     Vital Signs (Most Recent):  Temp: 98.5 °F (36.9 °C) (09/07/20 1609)  Pulse: 90 (09/07/20 1609)  Resp: 18 (09/07/20 1609)  BP: 132/81 (09/07/20 1609)  SpO2: 95 % (09/07/20 1609) Vital Signs (24h Range):  Temp:  [97.6 °F (36.4 °C)-98.7 °F (37.1 °C)] 98.5 °F (36.9 °C)  Pulse:  [66-90] 90  Resp:  [18-19] 18  SpO2:  [95 %-97 %] 95 %  BP: (112-164)/() 132/81     Weight: 125 kg (275 lb 9.2 oz)  Body mass index is 47.3 kg/m².    Intake/Output Summary (Last 24 hours) at 9/7/2020 2024  Last data filed at 9/7/2020 1115  Gross per 24 hour   Intake 600 ml   Output 3600 ml   Net -3000 ml      Physical Exam  Constitutional:       General: She is not in acute distress.     Appearance: She is well-developed. She is not diaphoretic.          Comments: In wheel chair   HENT:      Head: Normocephalic and atraumatic.      Nose: No  congestion or rhinorrhea.      Mouth/Throat:      Mouth: Mucous membranes are moist.      Pharynx: Oropharynx is clear.   Eyes:      General: No scleral icterus.     Extraocular Movements: Extraocular movements intact.      Conjunctiva/sclera: Conjunctivae normal.      Pupils: Pupils are equal, round, and reactive to light.   Cardiovascular:      Rate and Rhythm: Normal rate and regular rhythm.      Heart sounds: Normal heart sounds. No murmur. No friction rub. No gallop.    Pulmonary:      Effort: Pulmonary effort is normal. No respiratory distress.      Breath sounds: Normal breath sounds. No wheezing or rales.   Abdominal:      General: Bowel sounds are normal. There is no distension.      Palpations: Abdomen is soft. There is no mass.      Tenderness: There is no abdominal tenderness. There is no guarding or rebound.   Skin:     General: Skin is warm and dry.      Coloration: Skin is not jaundiced or pale.      Findings: Lesion present. No rash.   Neurological:      General: No focal deficit present.      Mental Status: She is alert and oriented to person, place, and time. Mental status is at baseline.      Cranial Nerves: No cranial nerve deficit.   Psychiatric:         Behavior: Behavior normal.         Significant Labs:   Recent Lab Results       09/07/20  1612   09/07/20  1057   09/07/20  0835   09/07/20  0752   09/07/20  0657        Albumin               Alkaline Phosphatase               ALT               Anion Gap               AST               Baso #               Basophil%               BILIRUBIN TOTAL               BUN, Bld               Calcium               Chloride               CO2               Creatinine               Differential Method               eGFR if                eGFR if non                Eos #               Eosinophil%               Glucose               Gran # (ANC)               Gran%               Hematocrit               Hemoglobin                Immature Grans (Abs)               Immature Granulocytes               Lymph #               Lymph%               Magnesium               MCH               MCHC               MCV               Mono #               Mono%               MPV               nRBC               Phosphorus               Platelets               POCT Glucose 233 145   210 186     Potassium               PROTEIN TOTAL               RBC               RDW               Sodium               Vancomycin-Trough     18.2         WBC                                09/07/20  0503   09/06/20  2214        Albumin 3.4       Alkaline Phosphatase 64       ALT 22       Anion Gap 22       AST 26  Comment:  *Result may be interfered by visible hemolysis       Baso # 0.06       Basophil% 0.5       BILIRUBIN TOTAL 0.5  Comment:  For infants and newborns, interpretation of results should be based  on gestational age, weight and in agreement with clinical  observations.  Premature Infant recommended reference ranges:  Up to 24 hours.............<8.0 mg/dL  Up to 48 hours............<12.0 mg/dL  3-5 days..................<15.0 mg/dL  6-29 days.................<15.0 mg/dL         BUN, Bld 64       Calcium 8.9       Chloride 94       CO2 11       Creatinine 8.5       Differential Method Automated       eGFR if African American 5.1       eGFR if non  4.5  Comment:  Calculation used to obtain the estimated glomerular filtration  rate (eGFR) is the CKD-EPI equation.          Eos # 0.2       Eosinophil% 1.7       Glucose 156       Gran # (ANC) 8.1       Gran% 72.0       Hematocrit 34.1       Hemoglobin 10.2       Immature Grans (Abs) 0.11  Comment:  Mild elevation in immature granulocytes is non specific and   can be seen in a variety of conditions including stress response,   acute inflammation, trauma and pregnancy. Correlation with other   laboratory and clinical findings is essential.         Immature Granulocytes 1.0       Lymph # 1.9       Lymph%  16.5       Magnesium 2.0       MCH 29.2       MCHC 29.9       MCV 98       Mono # 0.9       Mono% 8.3       MPV SEE COMMENT  Comment:  Result not available.       nRBC 0       Phosphorus 7.4       Platelets 310       POCT Glucose   223     Potassium 6.3  Comment:  *Critical value -   Results called to and read back by:TAYLER TENORIO RN         PROTEIN TOTAL 7.8       RBC 3.49       RDW 19.9       Sodium 127       Vancomycin-Trough         WBC 11.29             Significant Imaging: I have reviewed and interpreted all pertinent imaging results/findings within the past 24 hours.

## 2020-09-08 NOTE — DISCHARGE SUMMARY
[facility] Hospital Medicine  Discharge Summary      Patient Name: Kylie King  MRN: [id number]  Admission Date: 9/3/2020  Hospital Length of Stay: 4 days  Discharge Date and Time:  09/07/2020 8:28 PM  Attending Physician: No att. providers found   Discharging Provider: Vira Doan MD  Primary Care Provider: Virginia Foote MD  [facility] Medicine Team: [facility]OSP MED 1 Vira Doan MD    HPI:   Ms. King is a 63 yo F with T2DM, ESRD on HD (MWF), HFpEF (EF 50-55%), HTN, HLD, COPD, T2DM, chronic venous insufficiency, and anxiety/depression that presents from Podiatry clinic acute worsening R foot wound.    Patient has chronic venous insufficiency and T2DM with poor foot hygiene. She is followed by Podiatry (Dr. Robles). She had been seen on 8/4 for loose hallux nail with localized swelling and erythema. She underwent debridement and was prescribed clindamycin x 7 days. The wound was stable while on ABx, but wound worsened 2 days after stopping ABx. She was seen on 9/3 at Podiatry clinic. She had new gangrenous changes so was sent to ED for further evaluation.    ED Course:  Afebrile, hypertensive (170s/90s). Pulses dopplerable in ED. Labs notable for mild hypoNa, elevated BUN + Cr, hyperglycemia stable normocytic anemia, and elevated inflammatory markers. No leukocytosis. XR foot showed diffuse soft tissue swelling, healed fracture of R 5th metatarsal. Vasc Sx consulted in ED, recommended HM admission + Podiatry consult, but they anticipate future amputation. Full Vasc Sx consult to follow.     * No surgery found *      Hospital Course:   On HD 2, it was noted that pt's R TBI was markedly reduced. MRI of the Right foot done without any signs of osteomyelitis. Podiatry following and requested u/s of right lower extremity to rule out DVT as patient has had some calf pain today. Vanc and Cefepime were started on 9/6/20. DVT Ruled out on 9/6/20.  Pt unable to discharge when medically ready due  to inability to confirm ABx availability at dialysis center.    Interval History: No acute events overnight.    Review of Systems   Constitutional: Negative for activity change, chills, fatigue, fever and unexpected weight change.   HENT: Negative for congestion.    Eyes: Negative for visual disturbance.   Respiratory: Negative for cough, choking, chest tightness, shortness of breath, wheezing and stridor.    Cardiovascular: Negative for chest pain, palpitations and leg swelling.   Gastrointestinal: Negative for abdominal distention, abdominal pain, blood in stool, constipation, diarrhea, nausea and vomiting.   Endocrine: Negative for polyuria.   Genitourinary: Negative for difficulty urinating, dysuria, flank pain, frequency, urgency and vaginal discharge.   Musculoskeletal: Negative for arthralgias and gait problem.   Skin: Positive for wound. Negative for pallor and rash.   Neurological: Negative for dizziness, tremors, seizures, facial asymmetry, weakness, numbness and headaches.   Psychiatric/Behavioral: Positive for sleep disturbance (2/2 discomfort). Negative for agitation, behavioral problems, confusion and decreased concentration.     Objective:     Vital Signs (Most Recent):  Temp: 98.5 °F (36.9 °C) (09/07/20 1609)  Pulse: 90 (09/07/20 1609)  Resp: 18 (09/07/20 1609)  BP: 132/81 (09/07/20 1609)  SpO2: 95 % (09/07/20 1609) Vital Signs (24h Range):  Temp:  [97.6 °F (36.4 °C)-98.7 °F (37.1 °C)] 98.5 °F (36.9 °C)  Pulse:  [66-90] 90  Resp:  [18-19] 18  SpO2:  [95 %-97 %] 95 %  BP: (112-164)/() 132/81     Weight: 125 kg (275 lb 9.2 oz)  Body mass index is 47.3 kg/m².    Intake/Output Summary (Last 24 hours) at 9/7/2020 2024  Last data filed at 9/7/2020 1115  Gross per 24 hour   Intake 600 ml   Output 3600 ml   Net -3000 ml      Physical Exam  Constitutional:       General: She is not in acute distress.     Appearance: She is well-developed. She is not diaphoretic.          Comments: In wheel chair   HENT:       Head: Normocephalic and atraumatic.      Nose: No congestion or rhinorrhea.      Mouth/Throat:      Mouth: Mucous membranes are moist.      Pharynx: Oropharynx is clear.   Eyes:      General: No scleral icterus.     Extraocular Movements: Extraocular movements intact.      Conjunctiva/sclera: Conjunctivae normal.      Pupils: Pupils are equal, round, and reactive to light.   Cardiovascular:      Rate and Rhythm: Normal rate and regular rhythm.      Heart sounds: Normal heart sounds. No murmur. No friction rub. No gallop.    Pulmonary:      Effort: Pulmonary effort is normal. No respiratory distress.      Breath sounds: Normal breath sounds. No wheezing or rales.   Abdominal:      General: Bowel sounds are normal. There is no distension.      Palpations: Abdomen is soft. There is no mass.      Tenderness: There is no abdominal tenderness. There is no guarding or rebound.   Skin:     General: Skin is warm and dry.      Coloration: Skin is not jaundiced or pale.      Findings: Lesion present. No rash.   Neurological:      General: No focal deficit present.      Mental Status: She is alert and oriented to person, place, and time. Mental status is at baseline.      Cranial Nerves: No cranial nerve deficit.   Psychiatric:         Behavior: Behavior normal.         Significant Labs:   Recent Lab Results       09/07/20  1612   09/07/20  1057   09/07/20  0835   09/07/20  0752   09/07/20  0657        Albumin               Alkaline Phosphatase               ALT               Anion Gap               AST               Baso #               Basophil%               BILIRUBIN TOTAL               BUN, Bld               Calcium               Chloride               CO2               Creatinine               Differential Method               eGFR if                eGFR if non                Eos #               Eosinophil%               Glucose               Gran # (ANC)               Gran%                Hematocrit               Hemoglobin               Immature Grans (Abs)               Immature Granulocytes               Lymph #               Lymph%               Magnesium               MCH               MCHC               MCV               Mono #               Mono%               MPV               nRBC               Phosphorus               Platelets               POCT Glucose 233 145   210 186     Potassium               PROTEIN TOTAL               RBC               RDW               Sodium               Vancomycin-Trough     18.2         WBC                                09/07/20  0503   09/06/20  2214        Albumin 3.4       Alkaline Phosphatase 64       ALT 22       Anion Gap 22       AST 26  Comment:  *Result may be interfered by visible hemolysis       Baso # 0.06       Basophil% 0.5       BILIRUBIN TOTAL 0.5  Comment:  For infants and newborns, interpretation of results should be based  on gestational age, weight and in agreement with clinical  observations.  Premature Infant recommended reference ranges:  Up to 24 hours.............<8.0 mg/dL  Up to 48 hours............<12.0 mg/dL  3-5 days..................<15.0 mg/dL  6-29 days.................<15.0 mg/dL         BUN, Bld 64       Calcium 8.9       Chloride 94       CO2 11       Creatinine 8.5       Differential Method Automated       eGFR if African American 5.1       eGFR if non  4.5  Comment:  Calculation used to obtain the estimated glomerular filtration  rate (eGFR) is the CKD-EPI equation.          Eos # 0.2       Eosinophil% 1.7       Glucose 156       Gran # (ANC) 8.1       Gran% 72.0       Hematocrit 34.1       Hemoglobin 10.2       Immature Grans (Abs) 0.11  Comment:  Mild elevation in immature granulocytes is non specific and   can be seen in a variety of conditions including stress response,   acute inflammation, trauma and pregnancy. Correlation with other   laboratory and clinical findings is essential.          Immature Granulocytes 1.0       Lymph # 1.9       Lymph% 16.5       Magnesium 2.0       MCH 29.2       MCHC 29.9       MCV 98       Mono # 0.9       Mono% 8.3       MPV SEE COMMENT  Comment:  Result not available.       nRBC 0       Phosphorus 7.4       Platelets 310       POCT Glucose   223     Potassium 6.3  Comment:  *Critical value -   Results called to and read back by:TAYLER TENORIORN         PROTEIN TOTAL 7.8       RBC 3.49       RDW 19.9       Sodium 127       Vancomycin-Trough         WBC 11.29             Significant Imaging: I have reviewed and interpreted all pertinent imaging results/findings within the past 24 hours.     Consults:   Consults (From admission, onward)        Status Ordering Provider     Inpatient consult to Infectious Diseases  Once     Provider:  (Not yet assigned)    Completed JONATHAN DEAL     Inpatient consult to Nephrology  Once     Provider:  (Not yet assigned)    Completed JONAHTAN DEAL     Inpatient consult to Podiatry  Once     Provider:  (Not yet assigned)    Completed JONATHAN DEAL     Inpatient consult to Vascular Surgery  Once     Provider:  (Not yet assigned)    Completed STACEY KNIGHT     Pharmacy to dose Vancomycin consult  Once     Provider:  (Not yet assigned)    Acknowledged LEON MINER          * Gangrene of right foot  65 yo F with gangrene of R 1st hallux. HD stable. Afebrile, no leukocytosis. XR foot showed diffuse soft tissue swelling, healed fracture of R 5th metatarsal. Received vanc in ED. Vasc Sx consulted in ED, recommended HM admission + Podiatry consult, but they anticipate future intervention.     Vascular Surgery consulted. Reduced R TBI. CTA reviewed and plan angiography with intervention Wednesday 9/9/20. Can be outpatient  Podiatry consulted. MRI without osteomyelitis. Per recs will obtain RLE u/s to rule out dvt for calf pain. C/w betadiene    Plan:  - Vasc Sx, Podiatry, and ID consulted, recs appreciated  - Vanc and Cefepime initiated today.  "Will be given after dialysis on discharge:   -Vancomycin 1g IV x1 post HD on HD days   -Cefepime 2g IV x1 post HD on HD days.  - BCx NGTD  - ID onboard.  - CTA completed.   - Vascular Surgery planning outpatient angiogram with intervention on 9/9.   - Wound care per Podiatry recs.   - CM arranged ABx with HD clinic (closed yesterday).     Debility  PT/OT consulted, recs appreciated  Home with  PT and OT    Final Active Diagnoses:    Diagnosis Date Noted POA    PRINCIPAL PROBLEM:  Gangrene of right foot [I96] 09/03/2020 Yes    Toe gangrene [I96]  Unknown    PVD (peripheral vascular disease) [I73.9]  Unknown    Tobacco use [Z72.0] 03/06/2019 Yes    Depression [F32.9] 02/14/2019 Yes    Chronic obstructive pulmonary disease [J44.9] 06/08/2018 Yes    History of colon cancer [Z85.038] 06/08/2018 Yes    ESRD (end stage renal disease) on dialysis [N18.6, Z99.2]  Not Applicable    Heart failure, diastolic, chronic [I50.32] 04/23/2018 Yes    Debility [R53.81] 04/13/2017 Yes    Morbid obesity [E66.01] 03/31/2017 Yes    Lumbar stenosis [M48.061] 11/23/2015 Yes    History of breast cancer [Z85.3] 03/21/2013 Not Applicable    Type 2 diabetes mellitus [E11.9] 12/22/2012 Yes    Essential hypertension [I10] 12/21/2012 Yes    Hypercholesterolemia [E78.00] 12/21/2012 Yes      Problems Resolved During this Admission:       Discharged Condition: fair    Disposition: Home or Self Care    Follow Up:    Patient Instructions:      BATH/SHOWER CHAIR FOR HOME USE     Order Specific Question Answer Comments   Height: 5' 4" (1.626 m)    Weight: 125 kg (275 lb 9.2 oz)    Does patient have medical equipment at home? rollator    Does patient have medical equipment at home? walker, rolling    Length of need (1-99 months): 12    Type: With back      Ambulatory referral/consult to Infectious Disease   Standing Status: Future   Referral Priority: Routine Referral Type: Consultation   Referral Reason: Specialty Services Required "   Requested Specialty: Infectious Diseases   Number of Visits Requested: 1     Ambulatory referral/consult to Podiatry   Standing Status: Future   Referral Priority: Routine Referral Type: Consultation   Referral Reason: Specialty Services Required   Requested Specialty: Podiatry   Number of Visits Requested: 1     Diet diabetic     Notify your health care provider if you experience any of the following:  persistent nausea and vomiting or diarrhea     Notify your health care provider if you experience any of the following:  redness, tenderness, or signs of infection (pain, swelling, redness, odor or green/yellow discharge around incision site)     Notify your health care provider if you experience any of the following:  worsening rash     Notify your health care provider if you experience any of the following:  increased confusion or weakness     Leave dressing on - Keep it clean, dry, and intact until clinic visit     Activity as tolerated       Significant Diagnostic Studies:     Pending Diagnostic Studies:     None         Medications:  Reconciled Home Medications:      Medication List      START taking these medications    ceFEPIme 2 gram injection  Commonly known as: MAXIPIME  Inject 2 g into the vein every Mon, Wed, Fri. for 7 days     vancomycin in dextrose 5 % 1 gram/250 mL Soln  Inject 250 mLs (1,000 mg total) into the vein every Mon, Wed, Fri. for 4 days        CONTINUE taking these medications    acetaminophen 325 MG tablet  Commonly known as: TYLENOL  Take 650 mg by mouth every 6 (six) hours as needed for mild pain 1-3/10 pain scale.     acetaminophen-codeine 300-30mg 300-30 mg Tab  Commonly known as: TYLENOL #3  TAKE 1 TABLET BY MOUTH EVERY 6 HOURS AS NEEDED     albuterol 90 mcg/actuation inhaler  Commonly known as: PROVENTIL/VENTOLIN HFA  Inhale 2 puffs into the lungs every 6 (six) hours as needed for Wheezing. Rescue     amLODIPine 10 MG tablet  Commonly known as: NORVASC  Take 1 tablet (10 mg total)  "by mouth every morning.     ammonium lactate 12 % Crea  Apply twice daily to affected parts both feet as needed.     atorvastatin 40 MG tablet  Commonly known as: LIPITOR  Take 1 tablet (40 mg total) by mouth once daily.     * BD ULTRA-FINE MINI PEN NEEDLE 31 gauge x 3/16" Ndle  Generic drug: pen needle, diabetic  USE 5 NEEDLES PER DAY     * pen needle, diabetic 32 gauge x 5/32" Ndle  1 Device by Misc.(Non-Drug; Combo Route) route 5 (five) times daily.     carvediloL 25 MG tablet  Commonly known as: COREG  Take 1 tablet (25 mg total) by mouth 2 (two) times daily.     citalopram 20 MG tablet  Commonly known as: CELEXA  Take 1 tablet (20 mg total) by mouth nightly.     diclofenac sodium 1 % Gel  Commonly known as: VOLTAREN  Apply 2 grams topically 4 (four) times daily.     famotidine 20 MG tablet  Commonly known as: PEPCID  Take 1 tablet (20 mg total) by mouth nightly as needed.     FERREX 150 FORTE 150-25-1 mg-mcg-mg Cap  Generic drug: iron polysacch complex-b12-fa 150-25-1 mg-mcg-mg  Take 150 mg by mouth once daily.     fish oil-omega-3 fatty acids 300-1,000 mg capsule  Take 1 capsule by mouth every morning.     fluticasone propionate 50 mcg/actuation nasal spray  Commonly known as: FLONASE  2 sprays (100 mcg total) by Each Nare route once daily.     gabapentin 400 MG capsule  Commonly known as: NEURONTIN  Take 1 capsule (400 mg total) by mouth every evening.     hydrALAZINE 25 MG tablet  Commonly known as: APRESOLINE  Take 1 tablet (25 mg total) by mouth 2 (two) times daily.     * insulin detemir U-100 100 unit/mL injection  Commonly known as: LEVEMIR  Inject 45 Units into the skin 2 (two) times daily.     * LEVEMIR FLEXTOUCH U-100 INSULN 100 unit/mL (3 mL) Inpn pen  Generic drug: insulin detemir U-100  Inject 45 Units into the skin 2 (two) times daily.     * HumaLOG KwikPen Insulin 100 unit/mL pen  Generic drug: insulin lispro  BLOOD GLUCOSE 150-200, INJECT 1 UNITS UNDER THE SKIN, 201-250, 2 UNITS, 251-300, 3 " UNITS THREE TIMES A DAY AS DIRECTED     * insulin lispro 100 unit/mL pen  Commonly known as: HumaLOG KwikPen Insulin  BLOOD GLUCOSE 150-200, INJECT 1 UNITS UNDER THE SKIN, 201-250, 2 UNITS, 251-300, 3 UNITS THREE TIMES A DAY AS DIRECTED     * insulin lispro 100 unit/mL pen  Commonly known as: HumaLOG KwikPen Insulin  BLOOD GLUCOSE 150-200, INJECT 15 UNITS UNDER THE SKIN, 201-250, 18 UNITS, 251-300, 20 UNITS THREE TIMES A DAY AS DIRECTED     insulin needles (disposable) 31 Ndle  Commonly known as: PEN NEEDLE, DIABETIC  Patient needs 5 needles a day     ketoconazole 2 % cream  Commonly known as: NIZORAL  Apply topically once daily.     lancets 33 gauge Misc  Commonly known as: ONETOUCH DELICA LANCETS  1 lancet by Misc.(Non-Drug; Combo Route) route 4 (four) times daily before meals and nightly.     letrozole 2.5 mg Tab  Commonly known as: FEMARA  Take 1 tablet (2.5 mg total) by mouth nightly.         * This list has 7 medication(s) that are the same as other medications prescribed for you. Read the directions carefully, and ask your doctor or other care provider to review them with you.                Indwelling Lines/Drains at time of discharge:   Lines/Drains/Airways     Drain                 Hemodialysis AV Fistula Right forearm -- days         Hemodialysis AV Fistula 05/28/18 1436  833 days                Time spent on the discharge of patient: 45 minutes  Patient was seen and examined on the date of discharge and determined to be suitable for discharge.         Vira Doan MD  Department of Hospital Medicine  [facility]

## 2020-09-09 ENCOUNTER — TELEPHONE (OUTPATIENT)
Dept: INFECTIOUS DISEASES | Facility: CLINIC | Age: 65
End: 2020-09-09

## 2020-09-09 ENCOUNTER — TELEPHONE (OUTPATIENT)
Dept: VASCULAR SURGERY | Facility: CLINIC | Age: 65
End: 2020-09-09

## 2020-09-09 DIAGNOSIS — Z01.818 PRE-OP EVALUATION: Primary | ICD-10-CM

## 2020-09-09 LAB
BACTERIA BLD CULT: NORMAL
BACTERIA BLD CULT: NORMAL

## 2020-09-09 NOTE — TELEPHONE ENCOUNTER
Contacted patient in response to message. Notified patient that Dr. Pollard would like to reschedule her leg angio for 09/17/20 and that she will need COVID-19 testing on 09/14/20. Pt agreed to dates. COVID-19 test scheduled, pt verified. Will contact patient on 09/16/20 with time of arrival and pre-op instructions.----- Message from Yana Yu sent at 9/9/2020  4:11 PM CDT -----  Regarding: Patient Returning your call  Contact: Self  She stated she is returning your call, you spoke with her  yesterday.  Please call her at [phone].    Thanks, Yana

## 2020-09-09 NOTE — TELEPHONE ENCOUNTER
----- Message from Charlette Delong sent at 9/9/2020 10:01 AM CDT -----  Contact: Pt  Pt called to reschedule appt, pt stated she can only be seen on Tues and Thursdays. Pt asked for a call back.

## 2020-09-10 NOTE — PROGRESS NOTES
Subjective:      Patient ID: Kylie King is a 64 y.o. female.    Chief Complaint: Follow-up (right foot ) and Foot Pain    Kylie is a 64 y.o. female who presents to the clinic for evaluation and treatment of high risk feet. Kylie has a past medical history of Anxiety, Arthritis, Breast cancer (1/2013), Carotid artery stenosis (8/13/2018), Cataract, Choledocholithiasis, Chronic obstructive pulmonary disease (6/8/2018), Chronic venous insufficiency, Colon cancer (2001), CRI (chronic renal insufficiency), Dialysis AV fistula malfunction, ESRD (end stage renal disease) on dialysis, Former smoker (6/8/2018), GERD (gastroesophageal reflux disease), History of acute pancreatitis (2009), History of cerebrovascular accident (6/8/2018), History of colon cancer, HTN (hypertension), benign, Hypercholesterolemia, Hyperlipidemia, Hypoxemia (6/8/2018), Intracerebral hemorrhage, Kidney stone, Lymphedema of both lower extremities, Obesity, Pancreatitis, Pancreatitis (2008), Physical deconditioning, Pulmonary edema, Sacroiliac joint pain, Spinal stenosis, Spinal stenosis, lumbar, Stroke (12/2012), Tobacco abuse, and Type 2 diabetes mellitus with neurological manifestations, controlled. The patient's chief complaint is long, thick toenails. This patient has documented high risk feet requiring routine maintenance secondary to diabetes mellitis and those secondary complications of diabetes, as mentioned..  She has had a 1 week history of increasingly painful, red, dark discoloration to multiple toes of the right foot.  PCP: Virginia Foote MD    Date Last Seen by PCP:   Chief Complaint   Patient presents with    Follow-up     right foot     Foot Pain         Current shoe gear:  Affected Foot: Extra depth shoes with custome accommodative insoles     Unaffected Foot: Extra depth shoes with custome accommodative insoles    Hemoglobin A1C   Date Value Ref Range Status   09/03/2020 8.8 (H) 4.0 - 5.6 % Final     Comment:     ADA  Screening Guidelines:  5.7-6.4%  Consistent with prediabetes  >or=6.5%  Consistent with diabetes  High levels of fetal hemoglobin interfere with the HbA1C  assay. Heterozygous hemoglobin variants (HbS, HgC, etc)do  not significantly interfere with this assay.   However, presence of multiple variants may affect accuracy.     03/05/2020 9.9 (H) 4.0 - 5.6 % Final     Comment:     ADA Screening Guidelines:  5.7-6.4%  Consistent with prediabetes  >or=6.5%  Consistent with diabetes  High levels of fetal hemoglobin interfere with the HbA1C  assay. Heterozygous hemoglobin variants (HbS, HgC, etc)do  not significantly interfere with this assay.   However, presence of multiple variants may affect accuracy.     11/21/2019 9.9 (H) 4.0 - 5.6 % Final     Comment:     ADA Screening Guidelines:  5.7-6.4%  Consistent with prediabetes  >or=6.5%  Consistent with diabetes  High levels of fetal hemoglobin interfere with the HbA1C  assay. Heterozygous hemoglobin variants (HbS, HgC, etc)do  not significantly interfere with this assay.   However, presence of multiple variants may affect accuracy.     10/09/2018 7.8 % Final       Review of Systems   Constitution: Negative for chills, decreased appetite, fever and night sweats.   HENT: Negative for congestion, ear discharge, nosebleeds and tinnitus.    Eyes: Negative for double vision, pain and visual disturbance.   Cardiovascular: Negative for chest pain, claudication, cyanosis and palpitations.   Respiratory: Negative for cough, hemoptysis, shortness of breath and wheezing.    Endocrine: Negative for cold intolerance and heat intolerance.   Hematologic/Lymphatic: Negative for adenopathy and bleeding problem.   Skin: Positive for color change, dry skin, nail changes and unusual hair distribution.   Musculoskeletal: Positive for arthritis, joint pain and stiffness. Negative for myalgias.   Gastrointestinal: Negative for abdominal pain, dysphagia, nausea and vomiting.   Genitourinary:  Negative for dysuria, flank pain, hematuria and pelvic pain.   Neurological: Positive for disturbances in coordination, numbness, paresthesias and sensory change.   Psychiatric/Behavioral: Negative for altered mental status, hallucinations and suicidal ideas. The patient does not have insomnia.    Allergic/Immunologic: Negative for environmental allergies and persistent infections.           Objective:      Physical Exam   Constitutional: She is oriented to person, place, and time. She appears well-developed and well-nourished.   HENT:   Head: Normocephalic and atraumatic.   Cardiovascular:   Pulses:       Dorsalis pedis pulses are 1+ on the right side, and 1+ on the left side.        Posterior tibial pulses are 1+ on the right side, and 1+ on the left side.   Pulmonary/Chest: Effort normal.   Musculoskeletal: Normal range of motion.   Anterior, lateral, and posterior muscle groups bilateral lower extremities show strength 4 over 5 symmetrically. Inspection and palpation of the joints and bones reveal no crepitus or joint effusion. No tenderness upon palpation. Mild plantar flexor contractures noted to digits 2 through 5 bilaterally.  Angle and base of gait are normal.    Left 2nd 3rd digit demonstrates increased flexion deformity compared other digits with distal hyperkeratotic changes to the toe.  Hyperkeratosis has sub hemorrhagic changes as well.   Feet:   Right Foot:   Skin Integrity: Positive for callus and dry skin.   Left Foot:   Skin Integrity: Positive for callus and dry skin.   Neurological: She is alert and oriented to person, place, and time. She displays atrophy and abnormal reflex. A sensory deficit is present.   Reflex Scores:       Patellar reflexes are 1+ on the right side and 1+ on the left side.       Achilles reflexes are 1+ on the right side and 1+ on the left side.  Sharp, dull, light touch, and vibratory sensation are diminished bilaterally. Proprioceptive sensation is intact to both lower  extremities. [place] Bonnie monofilament exam shows loss of protective sensation to plantar toes 1 through 5 bilaterally. Deep tendon reflexes to the patellar tendons is 1 over 4 bilaterally symmetrical. Deep tendon reflexes to the Achilles tendon is 0 over 4 bilaterally symmetrical. No ankle clonus or Babinski reflex noted bilaterally. Coordination is fair to both lower extremities.  Patient admits to intermittent burning and tingling in the feet.   Skin: Skin is warm and dry. Capillary refill takes 2 to 3 seconds. There is pallor.   Skin bilateral lower extremities noted to be thin, dry, and atrophic.  Gangrenous changes noted to the distal aspect of the right ft.  Hyperkeratotic lesions noted to both feet plantarly with tenderness.   Psychiatric: She has a normal mood and affect. Her behavior is normal.   Vitals reviewed.            Assessment:       Encounter Diagnoses   Name Primary?    Diabetic peripheral neuropathy associated with type 2 diabetes mellitus Yes    Peripheral vascular disease     Cellulitis of right foot     Dry gangrene - Right Foot          Plan:       Kylie was seen today for follow-up and foot pain.    Diagnoses and all orders for this visit:    Diabetic peripheral neuropathy associated with type 2 diabetes mellitus    Peripheral vascular disease    Cellulitis of right foot    Dry gangrene - Right Foot    Patient to report to the emergency department for admission for dry gangrene right foot and limb ischemia.  Patient will be followed as an inpatient.

## 2020-09-11 ENCOUNTER — TELEPHONE (OUTPATIENT)
Dept: INFECTIOUS DISEASES | Facility: CLINIC | Age: 65
End: 2020-09-11

## 2020-09-11 ENCOUNTER — PATIENT MESSAGE (OUTPATIENT)
Dept: INTERNAL MEDICINE | Facility: CLINIC | Age: 65
End: 2020-09-11

## 2020-09-11 DIAGNOSIS — M48.062 SPINAL STENOSIS OF LUMBAR REGION WITH NEUROGENIC CLAUDICATION: ICD-10-CM

## 2020-09-11 RX ORDER — ACETAMINOPHEN AND CODEINE PHOSPHATE 300; 30 MG/1; MG/1
TABLET ORAL
Qty: 90 TABLET | Refills: 0 | Status: ON HOLD | OUTPATIENT
Start: 2020-09-11 | End: 2020-09-17 | Stop reason: HOSPADM

## 2020-09-11 NOTE — TELEPHONE ENCOUNTER
· Called [facility] at [phone], spoke to Afsaneh    · Called to request vancomycin trough, Afsaneh stated pt was discharge with orders only for Cefepime.    · Confirmed with Jose WASHINGTON if that was correct. Jose stated pt needs to be on cefepime 2g and vancomycin 1g.    · Called Afsaneh back to informed her regarding the missing order for vancomycin.    · Fax orders to [facility] at [phone]  For IV ABX after HD every Monday, Wednesday, and Friday  1. Vancomycin 1g  2. Cefepime 2g  3. Vancomycin TR every Monday prior IV dose.

## 2020-09-13 RX ORDER — ACETAMINOPHEN AND CODEINE PHOSPHATE 300; 30 MG/1; MG/1
TABLET ORAL
Qty: 90 TABLET | Refills: 0 | Status: SHIPPED | OUTPATIENT
Start: 2020-09-13 | End: 2020-10-08 | Stop reason: SDUPTHER

## 2020-09-16 ENCOUNTER — TELEPHONE (OUTPATIENT)
Dept: VASCULAR SURGERY | Facility: CLINIC | Age: 65
End: 2020-09-16

## 2020-09-17 ENCOUNTER — HOSPITAL ENCOUNTER (OUTPATIENT)
Facility: HOSPITAL | Age: 65
Discharge: HOME | End: 2020-09-17
Attending: SURGERY | Admitting: SURGERY
Payer: MEDICARE

## 2020-09-17 ENCOUNTER — ANESTHESIA (OUTPATIENT)
Dept: SURGERY | Facility: HOSPITAL | Age: 65
End: 2020-09-17
Payer: MEDICARE

## 2020-09-17 ENCOUNTER — ANESTHESIA EVENT (OUTPATIENT)
Dept: SURGERY | Facility: HOSPITAL | Age: 65
End: 2020-09-17
Payer: MEDICARE

## 2020-09-17 VITALS
HEIGHT: 64 IN | BODY MASS INDEX: 47.04 KG/M2 | OXYGEN SATURATION: 95 % | DIASTOLIC BLOOD PRESSURE: 79 MMHG | SYSTOLIC BLOOD PRESSURE: 170 MMHG | TEMPERATURE: 98 F | RESPIRATION RATE: 16 BRPM | WEIGHT: 275.56 LBS | HEART RATE: 76 BPM

## 2020-09-17 DIAGNOSIS — I96 GANGRENE OF RIGHT FOOT: ICD-10-CM

## 2020-09-17 LAB — POCT GLUCOSE: 214 MG/DL (ref 70–110)

## 2020-09-17 PROCEDURE — 63600175 PHARM REV CODE 636 W HCPCS: Performed by: NURSE ANESTHETIST, CERTIFIED REGISTERED

## 2020-09-17 PROCEDURE — D9220A PRA ANESTHESIA: Mod: ANES,,, | Performed by: ANESTHESIOLOGY

## 2020-09-17 PROCEDURE — 63600175 PHARM REV CODE 636 W HCPCS: Performed by: STUDENT IN AN ORGANIZED HEALTH CARE EDUCATION/TRAINING PROGRAM

## 2020-09-17 PROCEDURE — C1876 STENT, NON-COA/NON-COV W/DEL: HCPCS | Performed by: SURGERY

## 2020-09-17 PROCEDURE — C1725 CATH, TRANSLUMIN NON-LASER: HCPCS | Performed by: SURGERY

## 2020-09-17 PROCEDURE — 25000003 PHARM REV CODE 250: Performed by: NURSE ANESTHETIST, CERTIFIED REGISTERED

## 2020-09-17 PROCEDURE — D9220A PRA ANESTHESIA: Mod: CRNA,,, | Performed by: NURSE ANESTHETIST, CERTIFIED REGISTERED

## 2020-09-17 PROCEDURE — 36000706: Performed by: SURGERY

## 2020-09-17 PROCEDURE — 37000009 HC ANESTHESIA EA ADD 15 MINS: Performed by: SURGERY

## 2020-09-17 PROCEDURE — 82962 GLUCOSE BLOOD TEST: CPT | Performed by: SURGERY

## 2020-09-17 PROCEDURE — 71000016 HC POSTOP RECOV ADDL HR: Performed by: SURGERY

## 2020-09-17 PROCEDURE — 37000008 HC ANESTHESIA 1ST 15 MINUTES: Performed by: SURGERY

## 2020-09-17 PROCEDURE — C1760 CLOSURE DEV, VASC: HCPCS | Performed by: SURGERY

## 2020-09-17 PROCEDURE — 27201423 OPTIME MED/SURG SUP & DEVICES STERILE SUPPLY: Performed by: SURGERY

## 2020-09-17 PROCEDURE — 25000003 PHARM REV CODE 250: Performed by: SURGERY

## 2020-09-17 PROCEDURE — C1894 INTRO/SHEATH, NON-LASER: HCPCS | Performed by: SURGERY

## 2020-09-17 PROCEDURE — 25000003 PHARM REV CODE 250: Performed by: STUDENT IN AN ORGANIZED HEALTH CARE EDUCATION/TRAINING PROGRAM

## 2020-09-17 PROCEDURE — 37226 PR FEM/POPL REVASC W/STENT: CPT | Mod: RT,,, | Performed by: SURGERY

## 2020-09-17 PROCEDURE — 36000707: Performed by: SURGERY

## 2020-09-17 PROCEDURE — C1769 GUIDE WIRE: HCPCS | Performed by: SURGERY

## 2020-09-17 PROCEDURE — 63600175 PHARM REV CODE 636 W HCPCS: Performed by: SURGERY

## 2020-09-17 PROCEDURE — 75710 PR  ANGIO EXTREMITY UNILAT: ICD-10-PCS | Mod: 26,59,, | Performed by: SURGERY

## 2020-09-17 PROCEDURE — 25500020 PHARM REV CODE 255: Performed by: SURGERY

## 2020-09-17 PROCEDURE — D9220A PRA ANESTHESIA: ICD-10-PCS | Mod: ANES,,, | Performed by: ANESTHESIOLOGY

## 2020-09-17 PROCEDURE — D9220A PRA ANESTHESIA: ICD-10-PCS | Mod: CRNA,,, | Performed by: NURSE ANESTHETIST, CERTIFIED REGISTERED

## 2020-09-17 PROCEDURE — 37226 PR FEM/POPL REVASC W/STENT: ICD-10-PCS | Mod: RT,,, | Performed by: SURGERY

## 2020-09-17 PROCEDURE — 71000015 HC POSTOP RECOV 1ST HR: Performed by: SURGERY

## 2020-09-17 PROCEDURE — 75710 ARTERY X-RAYS ARM/LEG: CPT | Mod: 26,59,, | Performed by: SURGERY

## 2020-09-17 DEVICE — IMPLANTABLE DEVICE: Type: IMPLANTABLE DEVICE | Site: ARTERIAL | Status: FUNCTIONAL

## 2020-09-17 RX ORDER — LIDOCAINE HYDROCHLORIDE 10 MG/ML
INJECTION, SOLUTION EPIDURAL; INFILTRATION; INTRACAUDAL; PERINEURAL
Status: DISCONTINUED | OUTPATIENT
Start: 2020-09-17 | End: 2020-09-17 | Stop reason: HOSPADM

## 2020-09-17 RX ORDER — FENTANYL CITRATE 50 UG/ML
INJECTION, SOLUTION INTRAMUSCULAR; INTRAVENOUS
Status: DISCONTINUED | OUTPATIENT
Start: 2020-09-17 | End: 2020-09-17

## 2020-09-17 RX ORDER — LIDOCAINE HYDROCHLORIDE 10 MG/ML
1 INJECTION, SOLUTION EPIDURAL; INFILTRATION; INTRACAUDAL; PERINEURAL ONCE
Status: DISCONTINUED | OUTPATIENT
Start: 2020-09-17 | End: 2021-01-01

## 2020-09-17 RX ORDER — HYDROMORPHONE HYDROCHLORIDE 1 MG/ML
0.2 INJECTION, SOLUTION INTRAMUSCULAR; INTRAVENOUS; SUBCUTANEOUS EVERY 5 MIN PRN
Status: DISCONTINUED | OUTPATIENT
Start: 2020-09-17 | End: 2020-09-17 | Stop reason: HOSPADM

## 2020-09-17 RX ORDER — MUPIROCIN 20 MG/G
OINTMENT TOPICAL
Status: DISCONTINUED | OUTPATIENT
Start: 2020-09-17 | End: 2021-01-01

## 2020-09-17 RX ORDER — PROTAMINE SULFATE 10 MG/ML
INJECTION, SOLUTION INTRAVENOUS
Status: DISCONTINUED | OUTPATIENT
Start: 2020-09-17 | End: 2020-09-17

## 2020-09-17 RX ORDER — IODIXANOL 320 MG/ML
INJECTION, SOLUTION INTRAVASCULAR
Status: DISCONTINUED | OUTPATIENT
Start: 2020-09-17 | End: 2020-09-17 | Stop reason: HOSPADM

## 2020-09-17 RX ORDER — CLOPIDOGREL BISULFATE 75 MG/1
300 TABLET ORAL ONCE
Status: COMPLETED | OUTPATIENT
Start: 2020-09-17 | End: 2020-09-17

## 2020-09-17 RX ORDER — ASPIRIN 81 MG/1
81 TABLET ORAL DAILY
Qty: 30 TABLET | Refills: 11 | Status: ON HOLD | OUTPATIENT
Start: 2020-09-17 | End: 2021-01-01 | Stop reason: HOSPADM

## 2020-09-17 RX ORDER — ONDANSETRON 2 MG/ML
4 INJECTION INTRAMUSCULAR; INTRAVENOUS ONCE AS NEEDED
Status: DISCONTINUED | OUTPATIENT
Start: 2020-09-17 | End: 2020-09-17 | Stop reason: HOSPADM

## 2020-09-17 RX ORDER — MIDAZOLAM HYDROCHLORIDE 1 MG/ML
INJECTION, SOLUTION INTRAMUSCULAR; INTRAVENOUS
Status: DISCONTINUED | OUTPATIENT
Start: 2020-09-17 | End: 2020-09-17

## 2020-09-17 RX ORDER — CEFAZOLIN SODIUM 1 G/3ML
2 INJECTION, POWDER, FOR SOLUTION INTRAMUSCULAR; INTRAVENOUS
Status: COMPLETED | OUTPATIENT
Start: 2020-09-17 | End: 2020-09-17

## 2020-09-17 RX ORDER — HEPARIN SODIUM 1000 [USP'U]/ML
INJECTION, SOLUTION INTRAVENOUS; SUBCUTANEOUS
Status: DISCONTINUED | OUTPATIENT
Start: 2020-09-17 | End: 2020-09-17 | Stop reason: HOSPADM

## 2020-09-17 RX ORDER — SODIUM CHLORIDE 0.9 % (FLUSH) 0.9 %
10 SYRINGE (ML) INJECTION
Status: DISCONTINUED | OUTPATIENT
Start: 2020-09-17 | End: 2020-10-13

## 2020-09-17 RX ORDER — SODIUM CHLORIDE 9 MG/ML
INJECTION, SOLUTION INTRAVENOUS CONTINUOUS PRN
Status: DISCONTINUED | OUTPATIENT
Start: 2020-09-17 | End: 2020-09-17

## 2020-09-17 RX ORDER — CLOPIDOGREL BISULFATE 75 MG/1
75 TABLET ORAL DAILY
Qty: 30 TABLET | Refills: 11 | Status: ON HOLD | OUTPATIENT
Start: 2020-09-17 | End: 2021-01-01 | Stop reason: SDUPTHER

## 2020-09-17 RX ORDER — ASPIRIN 81 MG/1
81 TABLET ORAL DAILY
Qty: 30 TABLET | Refills: 11 | Status: SHIPPED | OUTPATIENT
Start: 2020-09-17 | End: 2020-09-17 | Stop reason: SDUPTHER

## 2020-09-17 RX ORDER — CLOPIDOGREL BISULFATE 75 MG/1
75 TABLET ORAL DAILY
Qty: 30 TABLET | Refills: 11 | Status: SHIPPED | OUTPATIENT
Start: 2020-09-17 | End: 2020-09-17 | Stop reason: SDUPTHER

## 2020-09-17 RX ORDER — FENTANYL CITRATE 50 UG/ML
25 INJECTION, SOLUTION INTRAMUSCULAR; INTRAVENOUS EVERY 5 MIN PRN
Status: DISCONTINUED | OUTPATIENT
Start: 2020-09-17 | End: 2020-09-17 | Stop reason: HOSPADM

## 2020-09-17 RX ADMIN — CEFAZOLIN 3 G: 330 INJECTION, POWDER, FOR SOLUTION INTRAMUSCULAR; INTRAVENOUS at 03:09

## 2020-09-17 RX ADMIN — FENTANYL CITRATE 50 MCG: 50 INJECTION, SOLUTION INTRAMUSCULAR; INTRAVENOUS at 03:09

## 2020-09-17 RX ADMIN — MIDAZOLAM HYDROCHLORIDE 1 MG: 1 INJECTION, SOLUTION INTRAMUSCULAR; INTRAVENOUS at 03:09

## 2020-09-17 RX ADMIN — PROTAMINE SULFATE 80 MG: 10 INJECTION, SOLUTION INTRAVENOUS at 04:09

## 2020-09-17 RX ADMIN — SODIUM CHLORIDE: 0.9 INJECTION, SOLUTION INTRAVENOUS at 03:09

## 2020-09-17 RX ADMIN — FENTANYL CITRATE 50 MCG: 50 INJECTION, SOLUTION INTRAMUSCULAR; INTRAVENOUS at 04:09

## 2020-09-17 RX ADMIN — MUPIROCIN: 20 OINTMENT TOPICAL at 02:09

## 2020-09-17 RX ADMIN — CLOPIDOGREL 300 MG: 75 TABLET, FILM COATED ORAL at 06:09

## 2020-09-17 NOTE — ANESTHESIA PREPROCEDURE EVALUATION
09/17/2020  Kylie King is a 64 y.o., female.    Anesthesia Evaluation          Review of Systems  Anesthesia Hx:  No problems with previous Anesthesia   Social:  Non-Smoker    Cardiovascular:   Exercise tolerance: poor Hypertension Denies CAD.     Denies Angina. DAVIS  Functional Capacity Can you climb two flights of stairs? ==> Yes    Pulmonary:   Denies Asthma.  Denies Recent URI.  Denies Sleep Apnea.    Renal/:   Chronic Renal Disease, Dialysis, ESRD    Hepatic/GI:   Denies PUD. Denies Hiatal Hernia.  Denies GERD. Denies Liver Disease.  Denies Hepatitis.    Neurological:   CVA, no residual symptoms Denies Seizures.    Endocrine:   Diabetes Denies Hypothyroidism.        Physical Exam  General:  Well nourished    Airway/Jaw/Neck:  Airway Findings: Mouth Opening: Normal Tongue: Normal  General Airway Assessment: Adult  Mallampati: I  TM Distance: Normal, at least 6 cm  Jaw/Neck Findings:  Neck ROM: Normal ROM      Dental:  Dental Findings: In tact, Upper front caps             Anesthesia Plan  Type of Anesthesia, risks & benefits discussed:  Anesthesia Type:  general, MAC  Patient's Preference: Proceed with anesthesia understanding that the risks are very small but could be serious or life threatening.  Intra-op Monitoring Plan: standard ASA monitors  Intra-op Monitoring Plan Comments:   Post Op Pain Control Plan:   Post Op Pain Control Plan Comments:   Induction:   IV  Beta Blocker:  Patient is not currently on a Beta-Blocker (No further documentation required).       Informed Consent: Patient understands risks and agrees with Anesthesia plan.  Questions answered. Anesthesia consent signed with patient.  ASA Score: 4     Day of Surgery Review of History & Physical: I have interviewed and examined the patient. I have reviewed the patient's H&P dated:            Ready For Surgery From Anesthesia  Perspective.

## 2020-09-17 NOTE — TRANSFER OF CARE
"Anesthesia Transfer of Care Note    Patient: Kylie King    Procedure(s) Performed: Procedure(s) (LRB):  Angiogram Extremity Unilateral (Right)  STENT, FEMORAL ARTERY RIGHT (Right)    Patient location: [facility]    Anesthesia Type: MAC    Transport from OR: Transported from OR on room air with adequate spontaneous ventilation    Post pain: adequate analgesia    Post assessment: no apparent anesthetic complications and tolerated procedure well    Post vital signs: stable    Level of consciousness: awake and alert    Nausea/Vomiting: no nausea/vomiting    Complications: none    Transfer of care protocol was followed      Last vitals:   Visit Vitals  BP (!) 141/69 (BP Location: Left leg, Patient Position: Lying)   Pulse 74   Temp 37 °C (98.6 °F) (Oral)   Resp 18   Ht 5' 4" (1.626 m)   Wt 125 kg (275 lb 9.2 oz)   SpO2 96%   Breastfeeding No   BMI 47.30 kg/m²     "

## 2020-09-17 NOTE — BRIEF OP NOTE
[facility]  Brief Operative Note    Surgery Date: 9/17/2020     Surgeon(s) and Role:     * RICK Pollard III, MD - Primary     * Jose Andersen MD - Fellow    Assisting Surgeon: None    Pre-op Diagnosis:  Atherosclerotic PAD with gangrene, R LE    Post-op Diagnosis:   same    PROCEDURES:    1. Stent placement, R SFA (6x80 Lifestent)  2. Selective R LE angiogram  3. US guided L CFA access    Anesthesia: Local MAC    Description of the findings of the procedure(s): 5 fr L CFA/mynx closure; 6.2 min fluoro; 55 cc visipaque 690 mGy;   Distal R SFA occlusion (4 cm), successful recannulization, dominant robust PT into the foot; 2+ PT PULSE at completion    Estimated Blood Loss: 5cc       Specimens:   Specimen (12h ago, onward)    None            Discharge Note    OUTCOME: successful outpatient procedure    DISPOSITION: home    FINAL DIAGNOSIS:  PAD with gangrene    FOLLOWUP:  5 days with EVELIA and R toe PPGs/Pressures    DISCHARGE INSTRUCTIONS:  See below

## 2020-09-17 NOTE — ANESTHESIA POSTPROCEDURE EVALUATION
Anesthesia Post Evaluation    Patient: Kylie King    Procedure(s) Performed: Procedure(s) (LRB):  Angiogram Extremity Unilateral (Right)  STENT, FEMORAL ARTERY RIGHT (Right)    Final Anesthesia Type: general    Patient location during evaluation: PACU  Patient participation: Yes- Able to Participate  Level of consciousness: awake and alert  Post-procedure vital signs: reviewed and stable  Pain management: adequate  Airway patency: patent    PONV status at discharge: No PONV  Anesthetic complications: no      Cardiovascular status: hemodynamically stable  Respiratory status: spontaneous ventilation  Follow-up not needed.          Vitals Value Taken Time   /73 09/17/20 1701   Temp 36.6 °C (97.9 °F) 09/17/20 1653   Pulse 74 09/17/20 1702   Resp 16 09/17/20 1653   SpO2 92 % 09/17/20 1702   Vitals shown include unvalidated device data.      No case tracking events are documented in the log.      Pain/Mario Score: No data recorded

## 2020-09-17 NOTE — OP NOTE
"Surgery Date: 9/17/2020      Surgeon(s) and Role:     * RICK Pollard III, MD - Primary     * Jose Andersen MD - Fellow     Assisting Surgeon: None     Pre-op Diagnosis:  Atherosclerotic PAD with gangrene, R LE     Post-op Diagnosis:   same     PROCEDURES:     1. Stent placement, R SFA (6x80 Lifestent)  2. Selective R LE angiogram  3. US guided L CFA access     Anesthesia: Local MAC     Description of the findings of the procedure(s): 5 fr L CFA/mynx closure; 6.2 min fluoro; 55 cc visipaque 690 mGy;   Distal R SFA occlusion (4 cm), successful recannulization, dominant robust PT into the foot; 2+ PT PULSE at completion     Estimated Blood Loss: 5cc       Specimens:       Specimen (12h ago, onward)     None         Indications for Procedure:  Kylie King is a 64 y.o. female with right lower extremity critical limb ischemia with tissue loss. The patient was offered angiography. All risks, benefits, and alternatives were discussed. The patient understood and wished to proceed.     Description of procedure:  Patient identified in the preoperative holding area. The patient was taken to the hybrid OR and placed supine on the operating room table. Anesthesia was induced. The groins were prepped and draped in the usual standard fashion. A surgical pause was conducted confirming correct patient, correct site, correct pre-operative preparations.    We began by accessing the left common femoral artery with Micropuncture technique. A wheel of 1% lidocaine was raised over the area of the common femoral artery. A 21GA needle was advanced under ultrasound guidance into the lumen of the common femoral artery. A 0.018" wire was advanced in to the artery without difficulty or resistance. A 4F sheath was placed over the wire and an angiogram performed. There appeared to be no complications with access.     Next we advanced a guidewire in to the distal aorta under fluoroscopic guidance. We next advanced a WENDI catheter in to " the lumen of the aorta. We pulled the wire back in to the catheter and directed the catheter towards the contralateral common iliac artery. The wire was then passed and the common iliac artery selected. We advanced the wire in to the common femoral artery and advanced the catheter in to the external iliac artery.     A right lower extremity angiogram was performed which demonstrated:  Right common femoral, profunda femoral arteries: patent with non-occlusive atherosclerosis  Right superficial femoral artery: focal short occlusion distal SFA; diffuse disease   Right popliteal artery: patent with non-occlusive atherosclerosis  Tibials: PT dominant runoff to foot. AT and Peroneal patent at origin but taper to occlusion mid calf.    Based on the images from this diagnostic angiogram, a decision was made to intervene.     We then systemically heparinized the patient with [id number] of heparin.     We exchanged our [id number] sheath for a 5F x 70cm sheath. The sheath was delivered to the distal SFA. Enhanced angiography was performed. We crossed the lesion with an 035 stiff angled glidewire. The lesion was treated with a 5x80mm balloon. A 6x80mm LifeStent was deployed across the lesion. The stent was postdilated with a 5x80mm balloon. A follow-up angiogram showed resolution of the lesions and rapid flow to the PT. Heparin was reversed with protamine.     Dr. Pollard was present for all aspects of the case.    All instrument and sponge counts were confirmed correct x 2.    Jose Andersen MD PGY-7  Vascular and Endovascular Surgery Fellow

## 2020-09-17 NOTE — H&P
See H&P details below.  For angiogram LCFA approach; RLE angio with intervention.    Jose Andersen MD PGY-7  Vascular and Endovascular Surgery Fellow  09/17/2020                Cecil Mon MD   Resident   Vascular Surgery   Consults   Attested               Consult Orders   Inpatient consult to Vascular Surgery [[id number]] ordered by Leonard Li MD          Attestation signed by RICK Pollard III, MD at 9/4/2020 2:40 PM   I have personally taken the history and examined this patient and agree with the resident's note as stated above      64-year-old with end-stage renal disease and diabetes, with significant evolving tissue lost, right foot.     Rec/  CTA aorta and maximilian LE  ABIs (ordered)     JIM Pollard III, MD, FACS  Professor and Chief, Vascular and Endovascular Surgery                        []Hide copied text    []Hover for details  [facility] [address]  Vascular Surgery  Consult Note     Inpatient consult to Vascular Surgery  Consult performed by: Cecil Mon MD  Consult ordered by: Leonard Li MD        Subjective:      Chief Complaint/Reason for Admission: right foot gangrene     History of Present Illness: The patient is a 64 y.o. female with PMHx of DM Type 2, HTN, ESRD on dialysis TuThSa via L radiocephalic fistula created by Dr. Pollard in 8/2018 who presents with right foot cellulitis, and gangrenous appearing 1st and second toes.  She was directed to present to the ED by podiatry for a wound check on her right foot. She has chronic swelling and wounds in her right foot. She saw a podiatrist on 8/4 and had her toenails debrided. The top of her right foot turned black about one week ago. She recently finished a course of Clindamycin but states that her foot has still been getting worse. She denies any fever or chills. Vascular surgery is consulted to evaluate blood flow to foot.                Medications Prior to Admission   Medication Sig Dispense  Refill Last Dose    amLODIPine (NORVASC) 10 MG tablet Take 1 tablet (10 mg total) by mouth every morning. 90 tablet 3 9/3/2020    atorvastatin (LIPITOR) 40 MG tablet Take 1 tablet (40 mg total) by mouth once daily. 90 tablet 3 9/3/2020    carvediloL (COREG) 25 MG tablet Take 1 tablet (25 mg total) by mouth 2 (two) times daily. 180 tablet 3 9/3/2020    citalopram (CELEXA) 20 MG tablet Take 1 tablet (20 mg total) by mouth nightly. 90 tablet 3 9/3/2020    famotidine (PEPCID) 20 MG tablet Take 1 tablet (20 mg total) by mouth nightly as needed. 90 tablet 3 9/3/2020    gabapentin (NEURONTIN) 400 MG capsule Take 1 capsule (400 mg total) by mouth every evening. 90 capsule 3 9/3/2020    hydrALAZINE (APRESOLINE) 25 MG tablet Take 1 tablet (25 mg total) by mouth 2 (two) times daily. 180 tablet 3 9/3/2020    insulin detemir U-100 (LEVEMIR FLEXTOUCH U-100 INSULN) 100 unit/mL (3 mL) InPn pen Inject 45 Units into the skin 2 (two) times daily. 81 mL 3 9/3/2020    insulin lispro (HUMALOG KWIKPEN INSULIN) 100 unit/mL pen BLOOD GLUCOSE 150-200, INJECT 1 UNITS UNDER THE SKIN, 201-250, 2 UNITS, 251-300, 3 UNITS THREE TIMES A DAY AS DIRECTED 1 Box 3 9/3/2020    insulin lispro (HUMALOG KWIKPEN INSULIN) 100 unit/mL pen BLOOD GLUCOSE 150-200, INJECT 15 UNITS UNDER THE SKIN, 201-250, 18 UNITS, 251-300, 20 UNITS THREE TIMES A DAY AS DIRECTED 45 mL 3 9/3/2020    letrozole (FEMARA) 2.5 mg Tab Take 1 tablet (2.5 mg total) by mouth nightly. 90 tablet 3 9/3/2020    acetaminophen (TYLENOL) 325 MG tablet Take 650 mg by mouth every 6 (six) hours as needed for mild pain 1-3/10 pain scale.          acetaminophen-codeine 300-30mg (TYLENOL #3) 300-30 mg Tab TAKE 1 TABLET BY MOUTH EVERY 6 HOURS AS NEEDED 90 tablet 0      albuterol (PROVENTIL/VENTOLIN HFA) 90 mcg/actuation inhaler Inhale 2 puffs into the lungs every 6 (six) hours as needed for Wheezing. Rescue 18 g 3      ammonium lactate 12 % Crea Apply twice daily to affected parts both  "feet as needed. 140 g 11      BD INSULIN PEN NEEDLE UF MINI 31 gauge x 3/16" Ndle USE 5 NEEDLES PER  each 2      diclofenac sodium (VOLTAREN) 1 % Gel Apply 2 grams topically 4 (four) times daily. 100 g 3      fish oil-omega-3 fatty acids 300-1,000 mg capsule Take 1 capsule by mouth every morning.          fluticasone (FLONASE) 50 mcg/actuation nasal spray 2 sprays (100 mcg total) by Each Nare route once daily. 1 Bottle 2      insulin detemir U-100 (LEVEMIR) 100 unit/mL injection Inject 45 Units into the skin 2 (two) times daily.          insulin lispro (HUMALOG KWIKPEN INSULIN) 100 unit/mL pen BLOOD GLUCOSE 150-200, INJECT 1 UNITS UNDER THE SKIN, 201-250, 2 UNITS, 251-300, 3 UNITS THREE TIMES A DAY AS DIRECTED          insulin needles, disposable, (PEN NEEDLE, DIABETIC) 31 Ndle Patient needs 5 needles a day 500 each 3      iron polysacch complex-b12-fa 150-25-1 mg-mcg-mg (FERREX 150 FORTE) 150-25-1 mg-mcg-mg Cap Take 150 mg by mouth once daily.          ketoconazole (NIZORAL) 2 % cream Apply topically once daily. 1 Tube 2      lancets (ONETOUCH DELICA LANCETS) 33 gauge Misc 1 lancet by Misc.(Non-Drug; Combo Route) route 4 (four) times daily before meals and nightly. 400 each 4      pen needle, diabetic 32 gauge x 5/32" Ndle 1 Device by Misc.(Non-Drug; Combo Route) route 5 (five) times daily. 450 each 6                 Review of patient's allergies indicates:   Allergen Reactions    Cyclobenzaprine Hallucinations    Erythromycin         Stomach upset              Past Medical History:   Diagnosis Date    Anxiety      Arthritis       DDD, Lumbar    Breast cancer 1/2013     left breast- invasive mammary carcinoma, ER/GA positive, Her2 negative    Carotid artery stenosis 8/13/2018 6/22/2018: Carotid Duplex: SHANELL: Moderate plaquing - 2.0 m/s - <50%. LICA: Moderate plaquing - 1.6 m/s - <50%.    Cataract      Choledocholithiasis       s/p ERCP and stent placement    Chronic obstructive " pulmonary disease 6/8/2018    Chronic venous insufficiency      Colon cancer 2001    CRI (chronic renal insufficiency)       one kidney    Dialysis AV fistula malfunction      ESRD (end stage renal disease) on dialysis      Former smoker 6/8/2018    GERD (gastroesophageal reflux disease)      History of acute pancreatitis 2009 2/09 s/p sphincterotomy    History of cerebrovascular accident 6/8/2018    History of colon cancer       s/p sigmoid colectomy    HTN (hypertension), benign      Hypercholesterolemia      Hyperlipidemia      Hypoxemia 6/8/2018    Intracerebral hemorrhage      Kidney stone       left    Lymphedema of both lower extremities      Obesity      Pancreatitis      Pancreatitis 2008    Physical deconditioning      Pulmonary edema      Sacroiliac joint pain      Spinal stenosis      Spinal stenosis, lumbar      Stroke 12/2012    Tobacco abuse      Type 2 diabetes mellitus with neurological manifestations, controlled       Neuropathy            Past Surgical History:   Procedure Laterality Date    AV FISTULA PLACEMENT Right 5/28/2018     Procedure: CREATION -FISTULA-AV;  Surgeon: RICK Pollard III, MD;  Location: [facility] OR [address] [place] [address];  Service: Peripheral Vascular;  Laterality: Right;    BREAST BIOPSY   1/2012     left breast invasive mammary carcinoma (lateral bx) and intraductal papilloma (medial bx)    BREAST BIOPSY   1999     rt breast FA    CATARACT EXTRACTION W/  INTRAOCULAR LENS IMPLANT Right 1/24/2019         CATARACT EXTRACTION W/  INTRAOCULAR LENS IMPLANT Left 1/31/2019     Procedure: EXTRACTION, CATARACT, WITH IOL INSERTION;  Surgeon: Immanuel Moncada MD;  [facility];  Service: Ophthalmology;  Laterality: Left;    CHOLECYSTECTOMY   2001    COLON SURGERY        HERNIA REPAIR        left nephrectomy         atrophic kidney and hydronephrosis    left oopherectomy   2001    MASTECTOMY   2013     left    NEPHRECTOMY   2011     lap    REVISION  OF ARTERIOVENOUS FISTULA Right 8/15/2018     Procedure: REVISION, AV FISTULA;  Surgeon: RICK Pollard III, MD;  Location: [facility] OR 2ND FLR;  Service: Peripheral Vascular;  Laterality: Right;    SIGMOIDECTOMY       TOTAL REDUCTION MAMMOPLASTY Right 2013           Family History      Problem Relation (Age of Onset)     Arthritis Mother     Breast cancer Maternal Grandmother     Cancer Maternal Grandmother, Maternal Aunt     Celiac disease Sister     Diabetes Father     Heart disease Mother, Father, Maternal Grandmother                Tobacco Use    Smoking status: Current Some Day Smoker       Packs/day: 0.50       Years: 28.00       Pack years: 14.00       Types: Cigarettes       Start date: 1990       Last attempt to quit: 2018       Years since quittin.6    Smokeless tobacco: Never Used    Tobacco comment: anxious   Substance and Sexual Activity    Alcohol use: No    Drug use: No    Sexual activity: Never       Partners: Male      Review of Systems   Constitutional: Negative for activity change, chills and fever.   HENT: Negative for congestion, rhinorrhea, sinus pain and sore throat.    Respiratory: Negative for cough, chest tightness, shortness of breath and wheezing.    Cardiovascular: Negative for chest pain, palpitations and leg swelling.   Gastrointestinal: Negative for abdominal pain.   Musculoskeletal: Positive for joint swelling. Negative for myalgias.   Skin: Negative for color change and wound.   Neurological: Negative for dizziness, speech difficulty and numbness.      Objective:      Vital Signs (Most Recent):  Temp: 97.9 °F (36.6 °C) (20 0503)  Pulse: 68 (20 0600)  Resp: 16 (20 0503)  BP: (!) 158/62 (20 0518)  SpO2: 95 % (20 0503) Vital Signs (24h Range):  Temp:  [97.8 °F (36.6 °C)-98.8 °F (37.1 °C)] 97.9 °F (36.6 °C)  Pulse:  [68-84] 68  Resp:  [16-18] 16  SpO2:  [95 %-99 %] 95 %  BP: (140-177)/(62-93) 158/62      Weight: 125 kg (275 lb 9.2  oz)  Body mass index is 47.3 kg/m².     Physical Exam  Constitutional:       Appearance: She is obese.   HENT:      Head: Normocephalic.      Mouth/Throat:      Mouth: Mucous membranes are moist.   Eyes:      Pupils: Pupils are equal, round, and reactive to light.   Cardiovascular:      Rate and Rhythm: Normal rate and regular rhythm.      Comments: She has a palpable left PT pulse.  No other palpable distal pulses.  She has palpable bilateral femoral pulses  Pulmonary:      Effort: Pulmonary effort is normal. No respiratory distress.   Abdominal:      General: Abdomen is flat. There is no distension.      Palpations: Abdomen is soft.      Tenderness: There is no abdominal tenderness.      Comments: Well healed lower midline scar   Musculoskeletal:      Comments: Bilateral lower leg skin changes consistent with chronic venous stasis.  Right foot 1st and second digit with evidence of gangrene, mild cellulitis extending up foot, no gas. Motor sensory intact.  RUE AVG with good thrill   Neurological:      Mental Status: She is alert and oriented to person, place, and time.            Significant Labs:  BMP:       Recent Labs   Lab 09/04/20  0415   *   *   K 5.3*   CL 91*   CO2 23   BUN 46*   CREATININE 7.9*   CALCIUM 9.0   MG 1.9      CBC:       Recent Labs   Lab 09/04/20  0415   WBC 9.07   RBC 3.58*   HGB 10.4*   HCT 34.8*      MCV 97   MCH 29.1   MCHC 29.9*         Significant Diagnostics:  I have reviewed all pertinent imaging results/findings within the past 24 hours.     Assessment/Plan:      * Gangrene of right foot  64 year old female with diabetic infection of right foot, likely in need of amputation of first and/or second toes     -EVELIA's with toe pressures/PPGs, duplex of RLE  -abx per primary  -please keep NPO for possible procedures today by multiple services  -Nephrology consulted for dialysis     Further recommendations pending results of non-invasive studies           Thank you for  your consult.      Cecil Mon MD  Vascular Surgery  [facility]      Cosigned by: RICK Pollard III, MD at 9/4/2020  2:40 PM   Electronically signed by Cecil Mon MD at 9/4/2020  7:52 AM   Electronically signed by Cecil Mon MD at 9/4/2020  7:52 AM   Electronically signed by RICK Pollard III, MD at 9/4/2020  2:40 PM     ED to Hosp-Admission (Discharged) on 9/3/2020        Revision History        Detailed Report

## 2020-09-18 ENCOUNTER — TELEPHONE (OUTPATIENT)
Dept: PODIATRY | Facility: CLINIC | Age: 65
End: 2020-09-18

## 2020-09-18 ENCOUNTER — TELEPHONE (OUTPATIENT)
Dept: VASCULAR SURGERY | Facility: CLINIC | Age: 65
End: 2020-09-18

## 2020-09-18 LAB — POCT GLUCOSE: 149 MG/DL (ref 70–110)

## 2020-09-18 NOTE — TELEPHONE ENCOUNTER
Contacted patient's ex- to schedule appts with vascular lab, Dr. Pollard, and with Dr. Robles. Appointment scheduled, pt verified. Appointment letter placed in mail.     ----- Message from RICK Pollard III, MD sent at 9/17/2020  4:31 PM CDT -----  F/u 9/22 with ABIs and R toe PPGs/Pressures

## 2020-09-18 NOTE — TELEPHONE ENCOUNTER
Call patient to schedule  Appointment phone call went to voicemail and I could not leave message due to inbox being full.

## 2020-09-18 NOTE — DISCHARGE INSTRUCTIONS
Peripheral Angiography  Peripheral angiography is an outpatient procedure that makes a map of the vessels (arteries) in your lower body, legs, and arms, using X-ray and dye.This map can show where blood flow may be blocked.  Talk with your healthcare provider about the risks and complications of angiography.   Before the procedure  Prepare for the peripheral angiography as follows:   · Tell your healthcare provider about all medicines you take and any allergies you may have.  · Follow any directions youre given for not eating or drinking before the procedure. If your provider says to take your normal medicines, swallow them with only small sips of water.  · Arrange for a family member or friend to drive you home.  During the procedure  Here is what to expect:  ·   You may get medicine through an IV (intravenous) line to relax you. Youre given an injection to numb the insertion site. Then, a tiny skin cut (incision) is made near an artery in your groin.  · Your provider inserts a thin tube (catheter) through the incision. He or she then threads the catheter into an artery while looking at a video monitor.  · Contrast dye is injected into the catheter to confirm position. You may feel warmth or pressure in your legs and back. You lie still as X-rays are taken. The catheter is then taken out.  After the procedure  Youll be taken to a recovery area. A healthcare provider will apply pressure to the site for about 10 minutes. Your healthcare provider will tell you how long to lie down and keep the insertion site still. Your healthcare provider will discuss the results with you soon after the procedure.  Back at home  On the day you get home, dont drive, dont exercise, avoid walking and taking stairs, and avoid bending and lifting. Your healthcare provider may give you other care instructions.  Call your healthcare provider  Call your healthcare provider right away if:  · You notice a lump or bleeding at the  insertion site  · You feel pain at the insertion site  · You become lightheaded or dizzy  · You have leg pain or numbness  · You do not urinate in 8 hours    Date Last Reviewed: 5/1/2016  © 9358-0907 [employer]. [address] [place] [address], [place] [address]. All rights reserved. This information is not intended as a substitute for professional medical care. Always follow your healthcare professional's instructions.

## 2020-09-18 NOTE — PLAN OF CARE
Awake and alert. VSS. Denies pain or nausea. Tolerating liquids well. DC instructions given to patient and family and they verbalize understanding. Patient tolerated well 2 hours flat in bed. No bleeding or hematoma noted to L groin site.

## 2020-09-18 NOTE — TELEPHONE ENCOUNTER
----- Message from RICK Pollard III, MD sent at 9/17/2020  4:31 PM CDT -----  F/u 9/22 with ABIs and R toe PPGs/Pressures

## 2020-09-21 ENCOUNTER — TELEPHONE (OUTPATIENT)
Dept: INFECTIOUS DISEASES | Facility: CLINIC | Age: 65
End: 2020-09-21

## 2020-09-21 NOTE — TELEPHONE ENCOUNTER
Called [facility]  at [phone], spoke to Frieda  Pt was a non-admit on 9/10/2020. Frieda stated pt refused  services.    Pt has a follow up on 9/22/2020, with Dr Dia.  We have to labs on pt.     Called [facility] at [phone], spoke to Afsaneh,  Requested vancomycin Trough. Nurse stated vancomycin trough collected on 9/14/2020, was 16.8.

## 2020-09-22 ENCOUNTER — OFFICE VISIT (OUTPATIENT)
Dept: INFECTIOUS DISEASES | Facility: CLINIC | Age: 65
End: 2020-09-22
Payer: MEDICARE

## 2020-09-22 ENCOUNTER — HOSPITAL ENCOUNTER (OUTPATIENT)
Dept: VASCULAR SURGERY | Facility: CLINIC | Age: 65
Discharge: HOME | End: 2020-09-22
Attending: STUDENT IN AN ORGANIZED HEALTH CARE EDUCATION/TRAINING PROGRAM
Payer: MEDICARE

## 2020-09-22 ENCOUNTER — OFFICE VISIT (OUTPATIENT)
Dept: VASCULAR SURGERY | Facility: CLINIC | Age: 65
End: 2020-09-22
Payer: MEDICARE

## 2020-09-22 VITALS
TEMPERATURE: 98 F | BODY MASS INDEX: 46.95 KG/M2 | HEIGHT: 64 IN | HEART RATE: 73 BPM | SYSTOLIC BLOOD PRESSURE: 165 MMHG | WEIGHT: 275 LBS | DIASTOLIC BLOOD PRESSURE: 96 MMHG

## 2020-09-22 VITALS
SYSTOLIC BLOOD PRESSURE: 165 MMHG | HEART RATE: 73 BPM | HEIGHT: 64 IN | BODY MASS INDEX: 47.09 KG/M2 | DIASTOLIC BLOOD PRESSURE: 96 MMHG | TEMPERATURE: 98 F | WEIGHT: 275.81 LBS

## 2020-09-22 DIAGNOSIS — N18.6 ESRD (END STAGE RENAL DISEASE) ON DIALYSIS: ICD-10-CM

## 2020-09-22 DIAGNOSIS — I70.269 ATHEROSCLEROTIC PERIPHERAL VASCULAR DISEASE WITH GANGRENE: Primary | ICD-10-CM

## 2020-09-22 DIAGNOSIS — I96 GANGRENE OF RIGHT FOOT: ICD-10-CM

## 2020-09-22 DIAGNOSIS — I96 GANGRENE OF RIGHT FOOT: Primary | ICD-10-CM

## 2020-09-22 DIAGNOSIS — Z99.2 ESRD (END STAGE RENAL DISEASE) ON DIALYSIS: ICD-10-CM

## 2020-09-22 DIAGNOSIS — I73.9 PVD (PERIPHERAL VASCULAR DISEASE): Primary | ICD-10-CM

## 2020-09-22 PROBLEM — T82.590A DIALYSIS AV FISTULA MALFUNCTION: Status: RESOLVED | Noted: 2018-07-18 | Resolved: 2020-09-22

## 2020-09-22 PROCEDURE — 93926 PR DUPLEX LO EXTREM ART UNILAT/LTD: ICD-10-PCS | Mod: S$GLB,,, | Performed by: SURGERY

## 2020-09-22 PROCEDURE — 99213 PR OFFICE/OUTPT VISIT, EST, LEVL III, 20-29 MIN: ICD-10-PCS | Mod: GC,S$GLB,, | Performed by: INTERNAL MEDICINE

## 2020-09-22 PROCEDURE — 93926 LOWER EXTREMITY STUDY: CPT | Mod: S$GLB,,, | Performed by: SURGERY

## 2020-09-22 PROCEDURE — 99999 PR PBB SHADOW E&M-EST. PATIENT-LVL III: CPT | Mod: PBBFAC,,, | Performed by: SURGERY

## 2020-09-22 PROCEDURE — 99213 OFFICE O/P EST LOW 20 MIN: CPT | Mod: GC,S$GLB,, | Performed by: INTERNAL MEDICINE

## 2020-09-22 PROCEDURE — 93923 UPR/LXTR ART STDY 3+ LVLS: CPT | Mod: S$GLB,,, | Performed by: SURGERY

## 2020-09-22 PROCEDURE — 93923 PR NON-INVASIVE PHYSIOLOGIC STUDY EXTREMITY 3 LEVELS: ICD-10-PCS | Mod: S$GLB,,, | Performed by: SURGERY

## 2020-09-22 PROCEDURE — 99999 PR PBB SHADOW E&M-EST. PATIENT-LVL III: CPT | Mod: PBBFAC,GC,, | Performed by: INTERNAL MEDICINE

## 2020-09-22 PROCEDURE — 99214 PR OFFICE/OUTPT VISIT, EST, LEVL IV, 30-39 MIN: ICD-10-PCS | Mod: S$GLB,,, | Performed by: SURGERY

## 2020-09-22 PROCEDURE — 99999 PR PBB SHADOW E&M-EST. PATIENT-LVL III: ICD-10-PCS | Mod: PBBFAC,,, | Performed by: SURGERY

## 2020-09-22 PROCEDURE — 99999 PR PBB SHADOW E&M-EST. PATIENT-LVL III: ICD-10-PCS | Mod: PBBFAC,GC,, | Performed by: INTERNAL MEDICINE

## 2020-09-22 PROCEDURE — 99214 OFFICE O/P EST MOD 30 MIN: CPT | Mod: S$GLB,,, | Performed by: SURGERY

## 2020-09-22 NOTE — PROGRESS NOTES
"Infectious Disease Clinic Note  09/22/2020      Subjective:       Patient ID: Kylie King is a 64 y.o. female being seen for an new visit.    Chief Complaint: Follow-up    Ms. King is a 65y/o F pt with PMHx of ESRD on HD, T2DM, breast cancer, COPD, HTN, HLD, HFpEF and PAD c/b by gangrene of rt hallux who presents for a hospital follow-up visit.   Pt was admitted to [facility] on 9/3 with worsening rt foot pain and concern for gangrene. Per chart, pt had been seen by podiatry on 8/4 for a loose hallux nail and nail bed ulceration s/p debridement and 7 day clindamcyn tx. Wound improved on abx, but worsened 2 days post completion of treatment. She noted toe gradually became more discolored. MRI (9/4) was neg for OM. ID consulted on 9/6 and pt empirically started on vanc and cefepime pending possible toe amputation. Discharged on 9/7. Pt underwent angiogram on 9/17 which revealed focal short occlusion distal SFA s/p stent placement. Per pt, her rt hallux has become darker and appears "dead" although sensation is still intact. Also reports anterior rt leg tenderness, erythema, pruritus and skin weeping that has improved since hospital discharge. Denies fevers, nausea, vomiting, or diarrhea. Reports fatigue, malaise and chills at HD.      Family History   Problem Relation Age of Onset    Arthritis Mother     Heart disease Mother     Diabetes Father     Heart disease Father     Celiac disease Sister     Breast cancer Maternal Grandmother         possible-  patient unsure    Cancer Maternal Grandmother     Heart disease Maternal Grandmother     Cancer Maternal Aunt     Ovarian cancer Neg Hx     Glaucoma Neg Hx     Macular degeneration Neg Hx      Social History     Socioeconomic History    Marital status: Single     Spouse name: Not on file    Number of children: 2    Years of education: Not on file    Highest education level: Not on file   Occupational History    Occupation: not working     Employer: " [email or web address]   Social Needs    Financial resource strain: Not on file    Food insecurity     Worry: Not on file     Inability: Not on file    Transportation needs     Medical: Not on file     Non-medical: Not on file   Tobacco Use    Smoking status: Current Some Day Smoker     Packs/day: 0.50     Years: 28.00     Pack years: 14.00     Types: Cigarettes     Start date: 1990     Last attempt to quit: 2018     Years since quittin.7    Smokeless tobacco: Never Used    Tobacco comment: anxious   Substance and Sexual Activity    Alcohol use: No    Drug use: No    Sexual activity: Never     Partners: Male   Lifestyle    Physical activity     Days per week: Not on file     Minutes per session: Not on file    Stress: Not on file   Relationships    Social connections     Talks on phone: Not on file     Gets together: Not on file     Attends Sikh service: Not on file     Active member of club or organization: Not on file     Attends meetings of clubs or organizations: Not on file     Relationship status: Not on file   Other Topics Concern    Not on file   Social History Narrative    She is not exercising regularly     Past Surgical History:   Procedure Laterality Date    ANGIOGRAPHY OF LOWER EXTREMITY Right 2020    Procedure: Angiogram Extremity Unilateral;  Surgeon: RICK Pollard III, MD;  Location: [facility] OR [address] [place] [address];  Service: Peripheral Vascular;  Laterality: Right;  6.2 minutes of fluro  690.88 mGy  112.64 Gycm2  55 ml    AV FISTULA PLACEMENT Right 2018    Procedure: CREATION -FISTULA-AV;  Surgeon: RICK Pollard III, MD;  Location: [facility] OR [address] [place] [address];  Service: Peripheral Vascular;  Laterality: Right;    BREAST BIOPSY  2012    left breast invasive mammary carcinoma (lateral bx) and intraductal papilloma (medial bx)    BREAST BIOPSY      rt breast FA    CATARACT EXTRACTION W/  INTRAOCULAR LENS IMPLANT Right 2019        CATARACT EXTRACTION W/  INTRAOCULAR LENS  "IMPLANT Left 1/31/2019    Procedure: EXTRACTION, CATARACT, WITH IOL INSERTION;  Surgeon: Immanuel Moncada MD;  Location: [facility] OR;  Service: Ophthalmology;  Laterality: Left;    CHOLECYSTECTOMY  2001    COLON SURGERY      HERNIA REPAIR      left nephrectomy      atrophic kidney and hydronephrosis    left oopherectomy  2001    MASTECTOMY  2013    left    NEPHRECTOMY  2011    lap    REVISION OF ARTERIOVENOUS FISTULA Right 8/15/2018    Procedure: REVISION, AV FISTULA;  Surgeon: RICK Pollard III, MD;  Location: [facility] OR [address] [place] [address];  Service: Peripheral Vascular;  Laterality: Right;    SIGMOIDECTOMY  2001    TOTAL REDUCTION MAMMOPLASTY Right 2013       Patient's Medications   New Prescriptions    No medications on file   Previous Medications    ACETAMINOPHEN (TYLENOL) 325 MG TABLET    Take 650 mg by mouth every 6 (six) hours as needed for mild pain 1-3/10 pain scale.    ACETAMINOPHEN-CODEINE 300-30MG (TYLENOL #3) 300-30 MG TAB    TAKE 1 TABLET BY MOUTH EVERY 6 HOURS AS NEEDED    ALBUTEROL (PROVENTIL/VENTOLIN HFA) 90 MCG/ACTUATION INHALER    Inhale 2 puffs into the lungs every 6 (six) hours as needed for Wheezing. Rescue    AMLODIPINE (NORVASC) 10 MG TABLET    Take 1 tablet (10 mg total) by mouth every morning.    AMMONIUM LACTATE 12 % CREA    Apply twice daily to affected parts both feet as needed.    ASPIRIN (ECOTRIN) 81 MG EC TABLET    Take 1 tablet (81 mg total) by mouth once daily.    ATORVASTATIN (LIPITOR) 40 MG TABLET    Take 1 tablet (40 mg total) by mouth once daily.    BD INSULIN PEN NEEDLE UF MINI 31 GAUGE X 3/16" NDLE    USE 5 NEEDLES PER DAY    CARVEDILOL (COREG) 25 MG TABLET    Take 1 tablet (25 mg total) by mouth 2 (two) times daily.    CITALOPRAM (CELEXA) 20 MG TABLET    Take 1 tablet (20 mg total) by mouth nightly.    CLOPIDOGREL (PLAVIX) 75 MG TABLET    Take 1 tablet (75 mg total) by mouth once daily.    DICLOFENAC SODIUM (VOLTAREN) 1 % GEL    Apply 2 grams topically 4 (four) times daily.    " "FAMOTIDINE (PEPCID) 20 MG TABLET    Take 1 tablet (20 mg total) by mouth nightly as needed.    FISH OIL-OMEGA-3 FATTY ACIDS 300-1,000 MG CAPSULE    Take 1 capsule by mouth every morning.    FLUTICASONE (FLONASE) 50 MCG/ACTUATION NASAL SPRAY    2 sprays (100 mcg total) by Each Nare route once daily.    GABAPENTIN (NEURONTIN) 400 MG CAPSULE    Take 1 capsule (400 mg total) by mouth every evening.    HYDRALAZINE (APRESOLINE) 25 MG TABLET    Take 1 tablet (25 mg total) by mouth 2 (two) times daily.    INSULIN DETEMIR U-100 (LEVEMIR FLEXTOUCH U-100 INSULN) 100 UNIT/ML (3 ML) INPN PEN    Inject 45 Units into the skin 2 (two) times daily.    INSULIN DETEMIR U-100 (LEVEMIR) 100 UNIT/ML INJECTION    Inject 45 Units into the skin 2 (two) times daily.    INSULIN LISPRO (HUMALOG KWIKPEN INSULIN) 100 UNIT/ML PEN    BLOOD GLUCOSE 150-200, INJECT 1 UNITS UNDER THE SKIN, 201-250, 2 UNITS, 251-300, 3 UNITS THREE TIMES A DAY AS DIRECTED    INSULIN LISPRO (HUMALOG KWIKPEN INSULIN) 100 UNIT/ML PEN    BLOOD GLUCOSE 150-200, INJECT 15 UNITS UNDER THE SKIN, 201-250, 18 UNITS, 251-300, 20 UNITS THREE TIMES A DAY AS DIRECTED    INSULIN LISPRO (HUMALOG KWIKPEN INSULIN) 100 UNIT/ML PEN    BLOOD GLUCOSE 150-200, INJECT 1 UNITS UNDER THE SKIN, 201-250, 2 UNITS, 251-300, 3 UNITS THREE TIMES A DAY AS DIRECTED    INSULIN NEEDLES, DISPOSABLE, (PEN NEEDLE, DIABETIC) 31 NDLE    Patient needs 5 needles a day    IRON POLYSACCH COMPLEX-B12--25-1 MG-MCG-MG (FERREX 150 FORTE) 150-25-1 MG-MCG-MG CAP    Take 150 mg by mouth once daily.    KETOCONAZOLE (NIZORAL) 2 % CREAM    Apply topically once daily.    LANCETS (ONETOUCH DELICA LANCETS) 33 GAUGE MISC    1 lancet by Misc.(Non-Drug; Combo Route) route 4 (four) times daily before meals and nightly.    LETROZOLE (FEMARA) 2.5 MG TAB    Take 1 tablet (2.5 mg total) by mouth nightly.    PEN NEEDLE, DIABETIC 32 GAUGE X 5/32" NDLE    1 Device by Misc.(Non-Drug; Combo Route) route 5 (five) times daily. "   Modified Medications    No medications on file   Discontinued Medications    No medications on file       Patient Active Problem List    Diagnosis Date Noted    Toe gangrene     PVD (peripheral vascular disease)     Gangrene of right foot 09/03/2020    Hammer toe of left foot 03/19/2020    Tobacco use 03/06/2019    Upper respiratory infection, acute 02/19/2019    Impaired functional mobility and endurance 02/18/2019    Muscular imbalance 02/14/2019    Frequent falls 02/14/2019    Fall (on) (from) other stairs and steps, initial encounter 02/14/2019    Secondary renovascular hypertension, malignant 02/14/2019    Hyperkalemia 02/14/2019    Anemia in ESRD (end-stage renal disease) 02/14/2019    Occasional tremors 02/14/2019    Depression 02/14/2019    Primary osteoarthritis involving multiple joints 02/13/2019    Postoperative eye state 01/25/2019    Nuclear sclerosis, bilateral 01/24/2019    Carotid artery stenosis 08/13/2018 6/22/2018: Carotid Duplex: SHANELL: Moderate plaquing - 2.0 m/s - <50%. LICA: Moderate plaquing - 1.6 m/s - <50%.      Dialysis AV fistula malfunction 07/18/2018    History of cerebrovascular accident 06/08/2018 12/2012: CVA. Sequela is mildly affected writing.      History of colon cancer 06/08/2018     2000: Partial colectomy.      Former smoker 06/08/2018 1990: Began smoking. 1/2 ppd.  2018: Quit smoking.      Chronic obstructive pulmonary disease 06/08/2018 4/2018: Diagnosed.  Began home O2.      Hypoxemia 06/08/2018 4/2018: Diagnosed with CPOD and hypoxemia.  Began home O2.      Preop examination     ESRD (end stage renal disease) on dialysis      4/2018: Began HD.      Heart failure, diastolic, chronic 04/23/2018 4/20/2018: Echo: Normal left ventricular size with low normal systolic function. EF 50-55%. Mild LVH. Moderate diastolic dysfunction.      Shortness of breath 04/23/2018    Hypoxia 04/19/2018    Pulmonary edema 04/19/2018     Hypertensive emergency 04/19/2018    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 04/10/2018    Chronic venous insufficiency 03/20/2018    Lymphedema of both lower extremities 03/20/2018    Open wound of right heel 03/20/2018    Lymphedema 03/20/2018    Diabetic ulcer of right heel associated with type 2 diabetes mellitus, with fat layer exposed 03/12/2018    Debility 04/13/2017    Decreased range of motion (ROM) of shoulder 04/13/2017    Decreased range of motion (ROM) of knee 04/13/2017    Mixed urge and stress incontinence 03/31/2017    Nocturia 03/31/2017    Morbid obesity 03/31/2017 6/8/2018: Weight 119 kg. BMI 44.      Incontinence of urine in female 03/22/2017    S/P amputation of lesser toe 12/20/2016    Lumbar facet arthropathy 03/30/2016    Sacroiliac joint pain 12/04/2015    Physical deconditioning 12/04/2015    Facet syndrome 12/04/2015    Lumbar stenosis 11/23/2015    DDD (degenerative disc disease), lumbar 11/23/2015    Seroma 04/11/2013    History of breast cancer 03/21/2013     2013: Left mastectomy.      S/P mastectomy 02/25/2013    Diabetic peripheral neuropathy associated with type 2 diabetes mellitus 12/24/2012    Type 2 diabetes mellitus 12/22/2012 2007: Diagnosed. Complications: CVA & ESRD. Medications: Insulin.      Essential hypertension 12/21/2012 1999: Diagnosed.      Hypercholesterolemia 12/21/2012     2016: Began statin.      History of intracranial hemorrhage 12/20/2012 12/2012: Pontine bleed.         Review of Systems   Review of Systems   Constitutional: Positive for chills and malaise/fatigue. Negative for fever.   HENT: Negative for congestion and sore throat.    Respiratory: Negative for cough and shortness of breath.    Cardiovascular: Positive for leg swelling. Negative for chest pain.   Gastrointestinal: Negative for abdominal pain, diarrhea and vomiting.   Genitourinary: Negative for dysuria.   Musculoskeletal: Negative for back  "pain and myalgias.   Skin: Negative for rash.   Neurological: Negative for dizziness and headaches.   Psychiatric/Behavioral: Negative for substance abuse. The patient is not nervous/anxious.            Objective:      BP (!) 165/96 (BP Location: Left leg)   Pulse 73   Temp 98.3 °F (36.8 °C) (Oral)   Ht 5' 4" (1.626 m)   Wt 125.1 kg (275 lb 12.7 oz)   BMI 47.34 kg/m²   Estimated body mass index is 47.34 kg/m² as calculated from the following:    Height as of this encounter: 5' 4" (1.626 m).    Weight as of this encounter: 125.1 kg (275 lb 12.7 oz).    Physical Exam  Vitals signs reviewed.   Constitutional:       Appearance: Normal appearance.   HENT:      Head: Normocephalic and atraumatic.      Nose: Nose normal.      Mouth/Throat:      Mouth: Mucous membranes are dry.   Eyes:      Extraocular Movements: Extraocular movements intact.      Pupils: Pupils are equal, round, and reactive to light.   Neck:      Musculoskeletal: Normal range of motion and neck supple.   Cardiovascular:      Rate and Rhythm: Normal rate and regular rhythm.   Pulmonary:      Effort: Pulmonary effort is normal.      Breath sounds: Normal breath sounds.   Abdominal:      General: Abdomen is flat.      Palpations: Abdomen is soft.   Skin:     General: Skin is warm.      Comments: Rt leg-anterior leg with reddish discoloration and chronic venous stasis changes including small vesicles. Similar skin changes on left leg, but less erythematous.    Rt foot 1st toe with black discoloration, weeping. Necrotic area on 2nd toe. See image   Neurological:      General: No focal deficit present.      Mental Status: She is alert and oriented to person, place, and time.   Psychiatric:         Mood and Affect: Mood normal.         Behavior: Behavior normal.                 Assessment:         1. Gangrene of right foot           Plan:       Kylie was seen today for follow-up.    Diagnoses and all orders for this visit:    Gangrene of right foot  --pt " with PAD s/p stent c/b by gangrene of 1st digit Rt foot  --on vancomycin and cefepime since 9/6/20-9/22/20 for possible cellulitis of toe and leg  --BCx 9/3 NEG  --Rt lower ext with skin changes more consistent with chronic venous stasis changes    Plan:  --Rt foot gangrenous 1st toe likely needs amputation; will call podiatry clinic and attempt to get her into clinic sooner  --f/u with vasc surgery  --will discontinue vancomycin and cefepime        No follow-ups on file.   Case discusses with Dr. Guerra.    Oneida Brandon PGY-4  Infectious Disease

## 2020-09-22 NOTE — PROGRESS NOTES
Ref: CLEMENTINA Siegel    HISTORY OF PRESENT ILLNESS:  A 64-year-old female with new end-stage renal   disease,  Here for f/u.  It sounds as though her renal failure came on rather suddenly and was   hospitalized for some time.  She is still using supplemental oxygen, but she is   feeling better and can now lie flat.    Now s/p    1a.  R SFA stent 9/17/2020 for occlusion 9/17/2020  1. PTA, R AVF 9/12/18  2. Transposition, R AVF 8/15/18  3. Diagnostic fistulagram 7/18/18  4. R lincoln AVF 5/28/18.     9/22/2020:  This initial follow-up after the recent right SFA stent placement done for a gangrenous right 1st toe.  She was found to have a complete occlusion of the distal SFA which was successfully recanalized, had inline flow to the foot via the PT    PAST MEDICAL HISTORY:  1.  History of breast cancer.  2.  History of choledocholithiasis.  3.  Hypertension.  4.  Hyperlipidemia.  5.  History of intracerebral hemorrhage.  6.  History of pancreatitis.  7.  History of stroke.  8.  Type 2 diabetes.  9. ESRD dialyzing Monday/Wednesday/Friday  10. Severe COPD on home o2    PAST SURGICAL HISTORY:  1.  Sigmoidectomy.  2.  Cholecystectomy.  3.  Nephrectomy.  4.  Breast biopsies.    FAMILY HISTORY:  Positive for diabetes and heart disease.    SOCIAL HISTORY:  Current smoker.    MEDICATIONS:  No anticoagulants or antiplatelet agents.  See EPIC for full list.    ALLERGIES:  KEFLEX, ERYTHROMYCIN AND CYCLOBENZAPRINE.    REVIEW OF SYSTEMS:  Denies postprandial pain or DVT.  All other systems   including eyes, ENT, respiratory, musculoskeletal, skin, psychiatric, heme,   lymph, allergy and immune are negative.    PHYSICAL EXAMINATION:  VITAL SIGNS:  See nursing note.  GENERAL:  She appears somewhat chronically ill using supplemental oxygen.    Resume normal effort.  Clear to auscultation.  CARDIAC:  Regular rate and rhythm.  Nondisplaced PMI, no murmur.  VASCULAR:  2+ radial and brachial pulses.  Right PT 2+  EXTREMITIES:  Her arms are  somewhat generous in girth.  R arm shows good  Thrill proximally, soft thrill in mid  with modest pulsatility.  Strong 2+ radial proximal to anastomosis which is quite distal.  Stable    Right foot is wrapped in [place].  This was the exam in 5 days ago and she had dry gangrenous changes of right 1st toe     IMAGING:    AV fistula duplex 2019:  No stenosis, flow rate  955 (prior 701 (prior 607 (prior 273(    Fistualgrams re-reviewed.   Small radial artery with large cephalic vein.   Do not feel there is a arterial stenosis    ASSESSMENT:     1.  Atherosclerosis with gangrene, right lower extremity.   Status post a right SFA stent .   She now has in-line flow to her foot  2.  End-stage renal disease  3.  Severe COPD, need supplemental oxygen oxygen to lie flat    PLAN:  1.  Keep scheduled follow-up appointment with Podiatry (Dr. Billy Robles) in 1 week.  Suspect that she will need a right 1st toe amputation which should heal  Now with inline flow  2.  Three month follow-up with AV fistula duplex and ABIs    JIM Pollard III, MD, FACS  Professor and Chief, Vascular and Endovascular Surgery    CC: Yulisa Robles and Christal

## 2020-09-24 NOTE — PROGRESS NOTES
I have reviewed the notes, assessments, and/or procedures performed by Dr. Dia, I concur with her/his documentation of Kylie King.

## 2020-09-29 ENCOUNTER — OFFICE VISIT (OUTPATIENT)
Dept: PODIATRY | Facility: CLINIC | Age: 65
End: 2020-09-29
Payer: MEDICARE

## 2020-09-29 VITALS
SYSTOLIC BLOOD PRESSURE: 107 MMHG | HEART RATE: 77 BPM | BODY MASS INDEX: 46.95 KG/M2 | DIASTOLIC BLOOD PRESSURE: 72 MMHG | HEIGHT: 64 IN | WEIGHT: 275 LBS

## 2020-09-29 DIAGNOSIS — I96 DRY GANGRENE: Primary | ICD-10-CM

## 2020-09-29 DIAGNOSIS — L97.523 ULCERATED, FOOT, LEFT, WITH NECROSIS OF MUSCLE: ICD-10-CM

## 2020-09-29 DIAGNOSIS — R60.0 LEG EDEMA, LEFT: ICD-10-CM

## 2020-09-29 PROCEDURE — 11043 PR DEBRIDEMENT, SKIN, SUB-Q TISSUE,MUSCLE,=<20 SQ CM: ICD-10-PCS | Mod: S$GLB,,, | Performed by: PODIATRIST

## 2020-09-29 PROCEDURE — 99999 PR PBB SHADOW E&M-EST. PATIENT-LVL V: CPT | Mod: PBBFAC,,, | Performed by: PODIATRIST

## 2020-09-29 PROCEDURE — 99213 PR OFFICE/OUTPT VISIT, EST, LEVL III, 20-29 MIN: ICD-10-PCS | Mod: 25,S$GLB,, | Performed by: PODIATRIST

## 2020-09-29 PROCEDURE — 99213 OFFICE O/P EST LOW 20 MIN: CPT | Mod: 25,S$GLB,, | Performed by: PODIATRIST

## 2020-09-29 PROCEDURE — 11043 DBRDMT MUSC&/FSCA 1ST 20/<: CPT | Mod: S$GLB,,, | Performed by: PODIATRIST

## 2020-09-29 PROCEDURE — 99999 PR PBB SHADOW E&M-EST. PATIENT-LVL V: ICD-10-PCS | Mod: PBBFAC,,, | Performed by: PODIATRIST

## 2020-10-01 NOTE — PROGRESS NOTES
Subjective:      Patient ID: Kylie King is a 64 y.o. female.    Chief Complaint: Diabetes Mellitus and Foot Ulcer (right great toe)    Kylie is a 64 y.o. female who presents to the clinic for evaluation and treatment of high risk feet. Kylie has a past medical history of Anxiety, Arthritis, Breast cancer (1/2013), Carotid artery stenosis (8/13/2018), Cataract, Choledocholithiasis, Chronic obstructive pulmonary disease (6/8/2018), Chronic venous insufficiency, Colon cancer (2001), CRI (chronic renal insufficiency), Dialysis AV fistula malfunction, ESRD (end stage renal disease) on dialysis, Former smoker (6/8/2018), GERD (gastroesophageal reflux disease), History of acute pancreatitis (2009), History of cerebrovascular accident (6/8/2018), History of colon cancer, HTN (hypertension), benign, Hypercholesterolemia, Hyperlipidemia, Hypoxemia (6/8/2018), Intracerebral hemorrhage, Kidney stone, Lymphedema of both lower extremities, Obesity, Pancreatitis, Pancreatitis (2008), Physical deconditioning, Pulmonary edema, Sacroiliac joint pain, Spinal stenosis, Spinal stenosis, lumbar, Stroke (12/2012), Tobacco abuse, and Type 2 diabetes mellitus with neurological manifestations, controlled.This patient has documented high risk feet requiring routine maintenance secondary to diabetes mellitis and those secondary complications of diabetes, as mentioned..  She is following up after hospital discharge with dry gangrene of the right hallux as well as ulceration noted to the right foot.  PCP: Virginia Foote MD    Date Last Seen by PCP:   Chief Complaint   Patient presents with    Diabetes Mellitus    Foot Ulcer     right great toe         Current shoe gear:  Affected Foot: Extra depth shoes with custome accommodative insoles     Unaffected Foot: Extra depth shoes with custome accommodative insoles    Hemoglobin A1C   Date Value Ref Range Status   09/03/2020 8.8 (H) 4.0 - 5.6 % Final     Comment:     ADA Screening  Guidelines:  5.7-6.4%  Consistent with prediabetes  >or=6.5%  Consistent with diabetes  High levels of fetal hemoglobin interfere with the HbA1C  assay. Heterozygous hemoglobin variants (HbS, HgC, etc)do  not significantly interfere with this assay.   However, presence of multiple variants may affect accuracy.     03/05/2020 9.9 (H) 4.0 - 5.6 % Final     Comment:     ADA Screening Guidelines:  5.7-6.4%  Consistent with prediabetes  >or=6.5%  Consistent with diabetes  High levels of fetal hemoglobin interfere with the HbA1C  assay. Heterozygous hemoglobin variants (HbS, HgC, etc)do  not significantly interfere with this assay.   However, presence of multiple variants may affect accuracy.     11/21/2019 9.9 (H) 4.0 - 5.6 % Final     Comment:     ADA Screening Guidelines:  5.7-6.4%  Consistent with prediabetes  >or=6.5%  Consistent with diabetes  High levels of fetal hemoglobin interfere with the HbA1C  assay. Heterozygous hemoglobin variants (HbS, HgC, etc)do  not significantly interfere with this assay.   However, presence of multiple variants may affect accuracy.     10/09/2018 7.8 % Final       Review of Systems   Constitution: Negative for chills, decreased appetite, fever and night sweats.   HENT: Negative for congestion, ear discharge, nosebleeds and tinnitus.    Eyes: Negative for double vision, pain and visual disturbance.   Cardiovascular: Negative for chest pain, claudication, cyanosis and palpitations.   Respiratory: Negative for cough, hemoptysis, shortness of breath and wheezing.    Endocrine: Negative for cold intolerance and heat intolerance.   Hematologic/Lymphatic: Negative for adenopathy and bleeding problem.   Skin: Positive for color change, dry skin, nail changes and unusual hair distribution.   Musculoskeletal: Positive for arthritis, joint pain and stiffness. Negative for myalgias.   Gastrointestinal: Negative for abdominal pain, dysphagia, nausea and vomiting.   Genitourinary: Negative for  dysuria, flank pain, hematuria and pelvic pain.   Neurological: Positive for disturbances in coordination, numbness, paresthesias and sensory change.   Psychiatric/Behavioral: Negative for altered mental status, hallucinations and suicidal ideas. The patient does not have insomnia.    Allergic/Immunologic: Negative for environmental allergies and persistent infections.           Objective:      Physical Exam   Constitutional: She is oriented to person, place, and time. She appears well-developed and well-nourished.   HENT:   Head: Normocephalic and atraumatic.   Cardiovascular:   Pulses:       Dorsalis pedis pulses are 1+ on the right side, and 1+ on the left side.        Posterior tibial pulses are 1+ on the right side, and 1+ on the left side.   Pulmonary/Chest: Effort normal.   Musculoskeletal: Normal range of motion.   Anterior, lateral, and posterior muscle groups bilateral lower extremities show strength 4 over 5 symmetrically. Inspection and palpation of the joints and bones reveal no crepitus or joint effusion. No tenderness upon palpation. Mild plantar flexor contractures noted to digits 2 through 5 bilaterally.  Angle and base of gait are normal.    Left 2nd 3rd digit demonstrates increased flexion deformity compared other digits with distal hyperkeratotic changes to the toe.  Hyperkeratosis has sub hemorrhagic changes as well.   Feet:   Right Foot:   Skin Integrity: Positive for callus and dry skin.   Left Foot:   Skin Integrity: Positive for callus and dry skin.   Neurological: She is alert and oriented to person, place, and time. She displays atrophy and abnormal reflex. A sensory deficit is present.   Reflex Scores:       Patellar reflexes are 1+ on the right side and 1+ on the left side.       Achilles reflexes are 1+ on the right side and 1+ on the left side.  Sharp, dull, light touch, and vibratory sensation are diminished bilaterally. Proprioceptive sensation is intact to both lower extremities.  "[place]nie monofilament exam shows loss of protective sensation to plantar toes 1 through 5 bilaterally. Deep tendon reflexes to the patellar tendons is 1 over 4 bilaterally symmetrical. Deep tendon reflexes to the Achilles tendon is 0 over 4 bilaterally symmetrical. No ankle clonus or Babinski reflex noted bilaterally. Coordination is fair to both lower extremities.  Patient admits to intermittent burning and tingling in the feet.   Skin: Skin is warm and dry. Capillary refill takes 2 to 3 seconds. There is pallor.   Skin bilateral lower extremities noted to be thin, dry, and atrophic.  Gangrenous changes noted to the distal aspect of the right ft.  In addition 2 full-thickness ulceration through the fatty tissue layer in to the layer of the muscle.  This is noted to the dorsal medial aspect of the right forefoot.  The ulceration measures 3 cm x 2 cm x 0.3 cm.  Hyperkeratotic lesions noted to both feet plantarly with tenderness.   Psychiatric: She has a normal mood and affect. Her behavior is normal.   Vitals reviewed.            Assessment:       Encounter Diagnoses   Name Primary?    Dry gangrene - Right Foot Yes    Ulcerated, foot, left, with necrosis of muscle     Leg edema, left          Plan:       Kylie was seen today for diabetes mellitus and foot ulcer.    Diagnoses and all orders for this visit:    Dry gangrene - Right Foot    Ulcerated, foot, left, with necrosis of muscle    Leg edema, left      Wound Debridement    Performed by: Billy Robles DPM   Authorized by: Patient    Time out: Immediately prior to procedure a "time out" was called to verify the correct patient, procedure, equipment, support staff and site/side marked as required.   Consent Done?:  Yes (Verbal)  Local anesthesia used?: No       Wound Details:    Location:   right forefoot ulceration     Type of Debridement:  Excisional       Length (cm):   3       Width (cm):   2       Depth (cm):   0.3       Percent Debrided (%):  " 100             Depth of debridement:  Subcutaneous tissue    Tissue debrided:  Dermis, Epidermis and Subcutaneous and muscle tissue    Devitalized tissue debrided:  Biofilm, Callus and Necrotic/Eschar and muscle tissue    Instruments:  Blade, Curette and Nippers    0.3  Bleeding:  Minimal  Hemostasis Achieved: Yes    Method Used:  Pressure  Patient tolerance:  Patient tolerated the procedure well with no immediate complications    Continue Betadine to the dry gangrene right hallux as well as Medihoney to the full-thickness ulceration right medial forefoot.  This is followed by foam padding, 4 x 4 gauze, cast padding and Coban.  Follow-up in 1 week.

## 2020-10-05 ENCOUNTER — PATIENT MESSAGE (OUTPATIENT)
Dept: INTERNAL MEDICINE | Facility: CLINIC | Age: 65
End: 2020-10-05

## 2020-10-08 ENCOUNTER — OFFICE VISIT (OUTPATIENT)
Dept: INTERNAL MEDICINE | Facility: CLINIC | Age: 65
End: 2020-10-08
Payer: MEDICARE

## 2020-10-08 DIAGNOSIS — M48.062 SPINAL STENOSIS OF LUMBAR REGION WITH NEUROGENIC CLAUDICATION: ICD-10-CM

## 2020-10-08 PROCEDURE — 99213 PR OFFICE/OUTPT VISIT, EST, LEVL III, 20-29 MIN: ICD-10-PCS | Mod: 95,,, | Performed by: INTERNAL MEDICINE

## 2020-10-08 PROCEDURE — 99213 OFFICE O/P EST LOW 20 MIN: CPT | Mod: 95,,, | Performed by: INTERNAL MEDICINE

## 2020-10-08 RX ORDER — ACETAMINOPHEN AND CODEINE PHOSPHATE 300; 30 MG/1; MG/1
1 TABLET ORAL EVERY 6 HOURS PRN
Qty: 90 TABLET | Refills: 0 | Status: ON HOLD | OUTPATIENT
Start: 2020-10-08 | End: 2020-10-17 | Stop reason: HOSPADM

## 2020-10-08 RX ORDER — HONEY 100 %
1 PASTE (ML) TOPICAL 3 TIMES DAILY
Qty: 44 ML | Refills: 3 | Status: ON HOLD | OUTPATIENT
Start: 2020-10-08 | End: 2021-01-01 | Stop reason: HOSPADM

## 2020-10-08 NOTE — PROGRESS NOTES
The patient location is: home  The chief complaint leading to consultation is:  Follow-up spinal stenosis    Visit type: audiovisual    Face to Face time with patient: 9   minutes of total time spent on the encounter, which includes face to face time and non-face to face time preparing to see the patient (eg, review of tests), Obtaining and/or reviewing separately obtained history, Documenting clinical information in the electronic or other health record, Independently interpreting results (not separately reported) and communicating results to the patient/family/caregiver, or Care coordination (not separately reported).         Each patient to whom he or she provides medical services by telemedicine is:  (1) informed of the relationship between the physician and patient and the respective role of any other health care provider with respect to management of the patient; and (2) notified that he or she may decline to receive medical services by telemedicine and may withdraw from such care at any time.    Notes:   CC: followup of spinal stenosis  HPI:  The patient is a 64 y.o. year old female who presents to the office for followup of spinal stenosis.  She reports continued back pain.  She also reports gangrene of her great toe.  Sensation has improved following stent.  Bleeding has improved as well.  She complains of fatigue and tingling sensation.      PAST MEDICAL HISTORY:  Past Medical History:   Diagnosis Date    Anxiety     Arthritis     DDD, Lumbar    Breast cancer 1/2013    left breast- invasive mammary carcinoma, ER/LA positive, Her2 negative    Carotid artery stenosis 8/13/2018 6/22/2018: Carotid Duplex: SHANELL: Moderate plaquing - 2.0 m/s - <50%. LICA: Moderate plaquing - 1.6 m/s - <50%.    Cataract     Choledocholithiasis     s/p ERCP and stent placement    Chronic obstructive pulmonary disease 6/8/2018    Chronic venous insufficiency     Colon cancer 2001    CRI (chronic renal insufficiency)      one kidney    Dialysis AV fistula malfunction     ESRD (end stage renal disease) on dialysis     Former smoker 6/8/2018    GERD (gastroesophageal reflux disease)     History of acute pancreatitis 2009 2/09 s/p sphincterotomy    History of cerebrovascular accident 6/8/2018    History of colon cancer     s/p sigmoid colectomy    HTN (hypertension), benign     Hypercholesterolemia     Hyperlipidemia     Hypoxemia 6/8/2018    Intracerebral hemorrhage     Kidney stone     left    Lymphedema of both lower extremities     Obesity     Pancreatitis     Pancreatitis 2008    Physical deconditioning     Pulmonary edema     Sacroiliac joint pain     Spinal stenosis     Spinal stenosis, lumbar     Stroke 12/2012    Tobacco abuse     Type 2 diabetes mellitus with neurological manifestations, controlled     Neuropathy       SURGICAL HISTORY:  Past Surgical History:   Procedure Laterality Date    ANGIOGRAPHY OF LOWER EXTREMITY Right 9/17/2020    Procedure: Angiogram Extremity Unilateral;  Surgeon: RICK Pollard III, MD;  Location: [address];  Service: Peripheral Vascular;  Laterality: Right;  6.2 minutes of fluro  690.88 mGy  112.64 Gycm2  55 ml    AV FISTULA PLACEMENT Right 5/28/2018    Procedure: CREATION -FISTULA-AV;  Surgeon: RICK Pollard III, MD;  Location: [address];  Service: Peripheral Vascular;  Laterality: Right;    BREAST BIOPSY  1/2012    left breast invasive mammary carcinoma (lateral bx) and intraductal papilloma (medial bx)    BREAST BIOPSY  1999    rt breast FA    CATARACT EXTRACTION W/  INTRAOCULAR LENS IMPLANT Right 1/24/2019        CATARACT EXTRACTION W/  INTRAOCULAR LENS IMPLANT Left 1/31/2019    Procedure: EXTRACTION, CATARACT, WITH IOL INSERTION;  Surgeon: Immanuel Moncada MD;  Location: [facility];  Service: Ophthalmology;  Laterality: Left;    CHOLECYSTECTOMY  2001    COLON SURGERY      HERNIA REPAIR      left nephrectomy      atrophic kidney and  hydronephrosis    left oopherectomy  2001    MASTECTOMY  2013    left    NEPHRECTOMY  2011    lap    REVISION OF ARTERIOVENOUS FISTULA Right 8/15/2018    Procedure: REVISION, AV FISTULA;  Surgeon: RICK Pollard III, MD;  Location: [facility] OR [address] [place] [address];  Service: Peripheral Vascular;  Laterality: Right;    SIGMOIDECTOMY  2001    TOTAL REDUCTION MAMMOPLASTY Right 2013       MEDS:  Medcard reviewed and updated    ALLERGIES: Allergy Card reviewed and updated    SOCIAL HISTORY:   The patient is a nonsmoker.    PE:   APPEARANCE: Well nourished, well developed, in no acute distress.    Video visit  PSYCHIATRIC: The patient is oriented to person, place, and time and has a pleasant affect.        ASSESSMENT/PLAN:  Diagnoses and all orders for this visit:    Spinal stenosis of lumbar region with neurogenic claudication  -     Discontinue: acetaminophen-codeine 300-30mg (TYLENOL #3) 300-30 mg Tab; Take 1 tablet by mouth every 6 (six) hours as needed.  -     continue pain control    Other orders  -     honey (MEDIHONEY, HONEY,) 80 % Gel; Apply 1 application topically 3 (three) times daily.

## 2020-10-12 ENCOUNTER — PATIENT OUTREACH (OUTPATIENT)
Dept: ADMINISTRATIVE | Facility: OTHER | Age: 65
End: 2020-10-12

## 2020-10-12 NOTE — PROGRESS NOTES
LINKS immunization registry updated  Care Everywhere updated  Health Maintenance updated  Chart reviewed for overdue Proactive [facility] Encounters ([facility]) health maintenance testing (CRS, Breast Ca, Diabetic Eye Exam)   Orders entered:N/A

## 2020-10-13 ENCOUNTER — OFFICE VISIT (OUTPATIENT)
Dept: PODIATRY | Facility: CLINIC | Age: 65
DRG: 617 | End: 2020-10-13
Payer: MEDICARE

## 2020-10-13 ENCOUNTER — HOSPITAL ENCOUNTER (INPATIENT)
Facility: HOSPITAL | Age: 65
LOS: 4 days | Discharge: HOME | DRG: 617 | End: 2020-10-17
Attending: EMERGENCY MEDICINE | Admitting: STUDENT IN AN ORGANIZED HEALTH CARE EDUCATION/TRAINING PROGRAM
Payer: MEDICARE

## 2020-10-13 VITALS — HEART RATE: 79 BPM | SYSTOLIC BLOOD PRESSURE: 122 MMHG | TEMPERATURE: 98 F | DIASTOLIC BLOOD PRESSURE: 71 MMHG

## 2020-10-13 DIAGNOSIS — E87.1 HYPONATREMIA: ICD-10-CM

## 2020-10-13 DIAGNOSIS — L08.9 TOE INFECTION: ICD-10-CM

## 2020-10-13 DIAGNOSIS — I96 GANGRENE OF TOE OF RIGHT FOOT: ICD-10-CM

## 2020-10-13 DIAGNOSIS — R06.81 APNEA: ICD-10-CM

## 2020-10-13 DIAGNOSIS — D63.1 ANEMIA IN ESRD (END-STAGE RENAL DISEASE): ICD-10-CM

## 2020-10-13 DIAGNOSIS — N18.6 ESRD (END STAGE RENAL DISEASE) ON DIALYSIS: ICD-10-CM

## 2020-10-13 DIAGNOSIS — L97.519 DIABETIC ULCER OF RIGHT GREAT TOE: ICD-10-CM

## 2020-10-13 DIAGNOSIS — E83.39 HYPERPHOSPHATEMIA: ICD-10-CM

## 2020-10-13 DIAGNOSIS — E11.621 DIABETIC ULCER OF RIGHT GREAT TOE: ICD-10-CM

## 2020-10-13 DIAGNOSIS — E11.42 DIABETIC POLYNEUROPATHY ASSOCIATED WITH TYPE 2 DIABETES MELLITUS: ICD-10-CM

## 2020-10-13 DIAGNOSIS — Z89.411 RIGHT GREAT TOE AMPUTEE: ICD-10-CM

## 2020-10-13 DIAGNOSIS — N18.6 ANEMIA IN ESRD (END-STAGE RENAL DISEASE): ICD-10-CM

## 2020-10-13 DIAGNOSIS — L03.115 CELLULITIS OF RIGHT FOOT: ICD-10-CM

## 2020-10-13 DIAGNOSIS — E66.2 MORBID (SEVERE) OBESITY WITH ALVEOLAR HYPOVENTILATION: ICD-10-CM

## 2020-10-13 DIAGNOSIS — I96 DRY GANGRENE: Primary | ICD-10-CM

## 2020-10-13 DIAGNOSIS — R06.83 SNORING: ICD-10-CM

## 2020-10-13 DIAGNOSIS — L08.9 WOUND INFECTION: Primary | ICD-10-CM

## 2020-10-13 DIAGNOSIS — I96 DRY GANGRENE: ICD-10-CM

## 2020-10-13 DIAGNOSIS — Z99.2 HEMODIALYSIS PATIENT: ICD-10-CM

## 2020-10-13 DIAGNOSIS — T14.8XXA WOUND INFECTION: Primary | ICD-10-CM

## 2020-10-13 DIAGNOSIS — Z99.2 ESRD (END STAGE RENAL DISEASE) ON DIALYSIS: ICD-10-CM

## 2020-10-13 PROBLEM — D64.9 NORMOCYTIC ANEMIA: Status: ACTIVE | Noted: 2020-10-13

## 2020-10-13 LAB
ALBUMIN SERPL BCP-MCNC: 3 G/DL (ref 3.5–5.2)
ALP SERPL-CCNC: 63 U/L (ref 55–135)
ALT SERPL W/O P-5'-P-CCNC: 28 U/L (ref 10–44)
ANION GAP SERPL CALC-SCNC: 17 MMOL/L (ref 8–16)
AST SERPL-CCNC: 22 U/L (ref 10–40)
BASOPHILS # BLD AUTO: 0.03 K/UL (ref 0–0.2)
BASOPHILS NFR BLD: 0.5 % (ref 0–1.9)
BILIRUB SERPL-MCNC: 0.5 MG/DL (ref 0.1–1)
BUN SERPL-MCNC: 47 MG/DL (ref 8–23)
CALCIUM SERPL-MCNC: 8.8 MG/DL (ref 8.7–10.5)
CHLORIDE SERPL-SCNC: 86 MMOL/L (ref 95–110)
CO2 SERPL-SCNC: 24 MMOL/L (ref 23–29)
CREAT SERPL-MCNC: 7.2 MG/DL (ref 0.5–1.4)
CRP SERPL-MCNC: 114.6 MG/L (ref 0–8.2)
CTP QC/QA: YES
DIFFERENTIAL METHOD: ABNORMAL
EOSINOPHIL # BLD AUTO: 0.1 K/UL (ref 0–0.5)
EOSINOPHIL NFR BLD: 2.4 % (ref 0–8)
ERYTHROCYTE [DISTWIDTH] IN BLOOD BY AUTOMATED COUNT: 19.1 % (ref 11.5–14.5)
ERYTHROCYTE [SEDIMENTATION RATE] IN BLOOD BY WESTERGREN METHOD: 85 MM/HR (ref 0–36)
EST. GFR  (AFRICAN AMERICAN): 6.2 ML/MIN/1.73 M^2
EST. GFR  (NON AFRICAN AMERICAN): 5.4 ML/MIN/1.73 M^2
GLUCOSE SERPL-MCNC: 325 MG/DL (ref 70–110)
HCT VFR BLD AUTO: 33.8 % (ref 37–48.5)
HGB BLD-MCNC: 10.2 G/DL (ref 12–16)
IMM GRANULOCYTES # BLD AUTO: 0.03 K/UL (ref 0–0.04)
IMM GRANULOCYTES NFR BLD AUTO: 0.5 % (ref 0–0.5)
LACTATE SERPL-SCNC: 1.9 MMOL/L (ref 0.5–2.2)
LACTATE SERPL-SCNC: 1.9 MMOL/L (ref 0.5–2.2)
LYMPHOCYTES # BLD AUTO: 1.3 K/UL (ref 1–4.8)
LYMPHOCYTES NFR BLD: 22.4 % (ref 18–48)
MCH RBC QN AUTO: 28 PG (ref 27–31)
MCHC RBC AUTO-ENTMCNC: 30.2 G/DL (ref 32–36)
MCV RBC AUTO: 93 FL (ref 82–98)
MONOCYTES # BLD AUTO: 0.8 K/UL (ref 0.3–1)
MONOCYTES NFR BLD: 13.8 % (ref 4–15)
NEUTROPHILS # BLD AUTO: 3.6 K/UL (ref 1.8–7.7)
NEUTROPHILS NFR BLD: 60.4 % (ref 38–73)
NRBC BLD-RTO: 0 /100 WBC
PLATELET # BLD AUTO: 230 K/UL (ref 150–350)
PMV BLD AUTO: 11.3 FL (ref 9.2–12.9)
POCT GLUCOSE: 365 MG/DL (ref 70–110)
POTASSIUM SERPL-SCNC: 4.5 MMOL/L (ref 3.5–5.1)
PROT SERPL-MCNC: 7.5 G/DL (ref 6–8.4)
RBC # BLD AUTO: 3.64 M/UL (ref 4–5.4)
SARS-COV-2 RDRP RESP QL NAA+PROBE: NEGATIVE
SODIUM SERPL-SCNC: 127 MMOL/L (ref 136–145)
WBC # BLD AUTO: 5.93 K/UL (ref 3.9–12.7)

## 2020-10-13 PROCEDURE — 85025 COMPLETE CBC W/AUTO DIFF WBC: CPT

## 2020-10-13 PROCEDURE — 86140 C-REACTIVE PROTEIN: CPT

## 2020-10-13 PROCEDURE — 93005 ELECTROCARDIOGRAM TRACING: CPT

## 2020-10-13 PROCEDURE — 99214 PR OFFICE/OUTPT VISIT, EST, LEVL IV, 30-39 MIN: ICD-10-PCS | Mod: S$GLB,,, | Performed by: PODIATRIST

## 2020-10-13 PROCEDURE — C9399 UNCLASSIFIED DRUGS OR BIOLOG: HCPCS | Performed by: STUDENT IN AN ORGANIZED HEALTH CARE EDUCATION/TRAINING PROGRAM

## 2020-10-13 PROCEDURE — 25000003 PHARM REV CODE 250: Performed by: STUDENT IN AN ORGANIZED HEALTH CARE EDUCATION/TRAINING PROGRAM

## 2020-10-13 PROCEDURE — 99284 PR EMERGENCY DEPT VISIT,LEVEL IV: ICD-10-PCS | Mod: ,,, | Performed by: PHYSICIAN ASSISTANT

## 2020-10-13 PROCEDURE — 99214 OFFICE O/P EST MOD 30 MIN: CPT | Mod: S$GLB,,, | Performed by: PODIATRIST

## 2020-10-13 PROCEDURE — 63600175 PHARM REV CODE 636 W HCPCS: Performed by: STUDENT IN AN ORGANIZED HEALTH CARE EDUCATION/TRAINING PROGRAM

## 2020-10-13 PROCEDURE — 63600175 PHARM REV CODE 636 W HCPCS: Performed by: PHYSICIAN ASSISTANT

## 2020-10-13 PROCEDURE — 80053 COMPREHEN METABOLIC PANEL: CPT

## 2020-10-13 PROCEDURE — 99284 EMERGENCY DEPT VISIT MOD MDM: CPT | Mod: ,,, | Performed by: PHYSICIAN ASSISTANT

## 2020-10-13 PROCEDURE — U0002 COVID-19 LAB TEST NON-CDC: HCPCS | Performed by: PHYSICIAN ASSISTANT

## 2020-10-13 PROCEDURE — 96365 THER/PROPH/DIAG IV INF INIT: CPT

## 2020-10-13 PROCEDURE — 25000003 PHARM REV CODE 250: Performed by: PHYSICIAN ASSISTANT

## 2020-10-13 PROCEDURE — 99285 EMERGENCY DEPT VISIT HI MDM: CPT | Mod: 25

## 2020-10-13 PROCEDURE — 99999 PR PBB SHADOW E&M-EST. PATIENT-LVL III: CPT | Mod: PBBFAC,,, | Performed by: PODIATRIST

## 2020-10-13 PROCEDURE — 85652 RBC SED RATE AUTOMATED: CPT

## 2020-10-13 PROCEDURE — 83605 ASSAY OF LACTIC ACID: CPT | Mod: 91

## 2020-10-13 PROCEDURE — 93010 ELECTROCARDIOGRAM REPORT: CPT | Mod: 76,,, | Performed by: INTERNAL MEDICINE

## 2020-10-13 PROCEDURE — 96372 THER/PROPH/DIAG INJ SC/IM: CPT | Mod: 59

## 2020-10-13 PROCEDURE — 12000002 HC ACUTE/MED SURGE SEMI-PRIVATE ROOM

## 2020-10-13 PROCEDURE — 96375 TX/PRO/DX INJ NEW DRUG ADDON: CPT

## 2020-10-13 PROCEDURE — 99999 PR PBB SHADOW E&M-EST. PATIENT-LVL III: ICD-10-PCS | Mod: PBBFAC,,, | Performed by: PODIATRIST

## 2020-10-13 PROCEDURE — 83605 ASSAY OF LACTIC ACID: CPT

## 2020-10-13 PROCEDURE — 87040 BLOOD CULTURE FOR BACTERIA: CPT | Mod: 59

## 2020-10-13 PROCEDURE — 93010 EKG 12-LEAD: ICD-10-PCS | Mod: 76,,, | Performed by: INTERNAL MEDICINE

## 2020-10-13 RX ORDER — CITALOPRAM 20 MG/1
20 TABLET, FILM COATED ORAL NIGHTLY
Status: DISCONTINUED | OUTPATIENT
Start: 2020-10-13 | End: 2020-10-17 | Stop reason: HOSPADM

## 2020-10-13 RX ORDER — HYDRALAZINE HYDROCHLORIDE 25 MG/1
25 TABLET, FILM COATED ORAL 2 TIMES DAILY
Status: DISCONTINUED | OUTPATIENT
Start: 2020-10-13 | End: 2020-10-17 | Stop reason: HOSPADM

## 2020-10-13 RX ORDER — GLUCAGON 1 MG
1 KIT INJECTION
Status: DISCONTINUED | OUTPATIENT
Start: 2020-10-13 | End: 2020-10-17 | Stop reason: HOSPADM

## 2020-10-13 RX ORDER — INSULIN ASPART 100 [IU]/ML
10 INJECTION, SOLUTION INTRAVENOUS; SUBCUTANEOUS
Status: DISCONTINUED | OUTPATIENT
Start: 2020-10-14 | End: 2020-10-15

## 2020-10-13 RX ORDER — CARVEDILOL 25 MG/1
25 TABLET ORAL 2 TIMES DAILY
Status: DISCONTINUED | OUTPATIENT
Start: 2020-10-13 | End: 2020-10-17 | Stop reason: HOSPADM

## 2020-10-13 RX ORDER — CEFEPIME HYDROCHLORIDE 1 G/1
1 INJECTION, POWDER, FOR SOLUTION INTRAMUSCULAR; INTRAVENOUS
Status: DISCONTINUED | OUTPATIENT
Start: 2020-10-13 | End: 2020-10-14

## 2020-10-13 RX ORDER — GABAPENTIN 400 MG/1
400 CAPSULE ORAL NIGHTLY
Status: DISCONTINUED | OUTPATIENT
Start: 2020-10-13 | End: 2020-10-17 | Stop reason: HOSPADM

## 2020-10-13 RX ORDER — IBUPROFEN 200 MG
16 TABLET ORAL
Status: DISCONTINUED | OUTPATIENT
Start: 2020-10-13 | End: 2020-10-17 | Stop reason: HOSPADM

## 2020-10-13 RX ORDER — FLUTICASONE PROPIONATE 50 MCG
2 SPRAY, SUSPENSION (ML) NASAL DAILY
Status: DISCONTINUED | OUTPATIENT
Start: 2020-10-14 | End: 2020-10-17 | Stop reason: HOSPADM

## 2020-10-13 RX ORDER — ATORVASTATIN CALCIUM 20 MG/1
40 TABLET, FILM COATED ORAL DAILY
Status: DISCONTINUED | OUTPATIENT
Start: 2020-10-14 | End: 2020-10-17 | Stop reason: HOSPADM

## 2020-10-13 RX ORDER — IBUPROFEN 200 MG
24 TABLET ORAL
Status: DISCONTINUED | OUTPATIENT
Start: 2020-10-13 | End: 2020-10-17 | Stop reason: HOSPADM

## 2020-10-13 RX ORDER — AMLODIPINE BESYLATE 10 MG/1
10 TABLET ORAL NIGHTLY
Status: DISCONTINUED | OUTPATIENT
Start: 2020-10-13 | End: 2020-10-17 | Stop reason: HOSPADM

## 2020-10-13 RX ORDER — ASPIRIN 81 MG/1
81 TABLET ORAL DAILY
Status: DISCONTINUED | OUTPATIENT
Start: 2020-10-14 | End: 2020-10-17 | Stop reason: HOSPADM

## 2020-10-13 RX ORDER — SODIUM CHLORIDE 0.9 % (FLUSH) 0.9 %
10 SYRINGE (ML) INJECTION
Status: DISCONTINUED | OUTPATIENT
Start: 2020-10-13 | End: 2020-10-17 | Stop reason: HOSPADM

## 2020-10-13 RX ORDER — LETROZOLE 2.5 MG/1
2.5 TABLET, FILM COATED ORAL NIGHTLY
Status: DISCONTINUED | OUTPATIENT
Start: 2020-10-13 | End: 2020-10-17 | Stop reason: HOSPADM

## 2020-10-13 RX ORDER — ACETAMINOPHEN 325 MG/1
650 TABLET ORAL EVERY 6 HOURS PRN
Status: DISCONTINUED | OUTPATIENT
Start: 2020-10-13 | End: 2020-10-17 | Stop reason: HOSPADM

## 2020-10-13 RX ORDER — INSULIN ASPART 100 [IU]/ML
0-5 INJECTION, SOLUTION INTRAVENOUS; SUBCUTANEOUS
Status: DISCONTINUED | OUTPATIENT
Start: 2020-10-13 | End: 2020-10-17 | Stop reason: HOSPADM

## 2020-10-13 RX ADMIN — PIPERACILLIN AND TAZOBACTAM 4.5 G: 4; .5 INJECTION, POWDER, LYOPHILIZED, FOR SOLUTION INTRAVENOUS; PARENTERAL at 06:10

## 2020-10-13 RX ADMIN — CARVEDILOL 25 MG: 25 TABLET, FILM COATED ORAL at 09:10

## 2020-10-13 RX ADMIN — INSULIN DETEMIR 14 UNITS: 100 INJECTION, SOLUTION SUBCUTANEOUS at 09:10

## 2020-10-13 RX ADMIN — GABAPENTIN 400 MG: 400 CAPSULE ORAL at 09:10

## 2020-10-13 RX ADMIN — CITALOPRAM HYDROBROMIDE 20 MG: 20 TABLET ORAL at 09:10

## 2020-10-13 RX ADMIN — CEFEPIME 1 G: 1 INJECTION, POWDER, FOR SOLUTION INTRAMUSCULAR; INTRAVENOUS at 09:10

## 2020-10-13 RX ADMIN — AMLODIPINE BESYLATE 10 MG: 10 TABLET ORAL at 09:10

## 2020-10-13 RX ADMIN — LETROZOLE 2.5 MG: 2.5 TABLET ORAL at 09:10

## 2020-10-13 RX ADMIN — HYDRALAZINE HYDROCHLORIDE 25 MG: 25 TABLET, FILM COATED ORAL at 09:10

## 2020-10-13 RX ADMIN — VANCOMYCIN HYDROCHLORIDE 2000 MG: 100 INJECTION, POWDER, LYOPHILIZED, FOR SOLUTION INTRAVENOUS at 06:10

## 2020-10-13 NOTE — FIRST PROVIDER EVALUATION
Emergency Department TeleTriage Encounter Note      CHIEF COMPLAINT    Chief Complaint   Patient presents with    Wound Infection     right foot wound; might need to have toe removed       VITAL SIGNS   Initial Vitals [10/13/20 1426]   BP Pulse Resp Temp SpO2   (!) 173/10 84 18 99.6 °F (37.6 °C) 95 %      MAP       --            ALLERGIES    Review of patient's allergies indicates:   Allergen Reactions    Cyclobenzaprine Hallucinations    Erythromycin      Stomach upset       PROVIDER TRIAGE NOTE  This is a teletriage evaluation of a 65 y.o. female presenting to the ED with c/o wound to the right 1st and 2nd toe. Sent from Podiatry for evaluation of possible toe amputation.  Initial orders will be placed and care will be transferred to an alternate provider when patient is roomed for a full evaluation. Any additional orders and the final disposition will be determined by that provider.         ORDERS  Labs Reviewed   CBC W/ AUTO DIFFERENTIAL   COMPREHENSIVE METABOLIC PANEL   SARS-COV-2 RDRP GENE       ED Orders (720h ago, onward)    Start Ordered     Status Ordering Provider    10/13/20 1543 10/13/20 1542  Saline lock IV  Once      Ordered RUDY STANTON    10/13/20 1543 10/13/20 1542  CBC auto differential  STAT  Collect    Ordered RUDY STANTON    10/13/20 1543 10/13/20 1542  Comprehensive metabolic panel  STAT  Collect    Ordered RUDY STANTON    10/13/20 1543 10/13/20 1542  X-Ray Foot Complete Right  1 time imaging      Ordered RUDY STANTON    10/13/20 1543 10/13/20 1542  POCT COVID-19 Rapid Screening  Once      Ordered RUDY STANTON            Virtual Visit Note: The provider triage portion of this emergency department evaluation and documentation was performed via [employer], a HIPAA-compliant telemedicine application, in concert with a tele-presenter in the room. A face to face patient evaluation with one of my colleagues will occur once the patient is placed in an emergency department  room.      DISCLAIMER: This note was prepared with [employer] voice recognition transcription software. Garbled syntax, mangled pronouns, and other bizarre constructions may be attributed to that software system.

## 2020-10-13 NOTE — ED PROVIDER NOTES
"Encounter Date: 10/13/2020       History     Chief Complaint   Patient presents with    Wound Infection     right foot wound; might need to have toe removed     65-year-old female with multiple comorbidities including DM 2 with neuropathy, hypertension, history ICH, ESRD on HD MWF presents from Podiatry Clinic for infected foot ulcer.  The patient recently underwent an SFA stenting on 09/17/2020 for a completely occluded SFA, she reports the foot started to regain color and warmth about 3 weeks ago skin on the toe started to crack.  The past few days she has noticed some foul smelling discharge from the foot and has had fever at home with T-max of 101.8° F. She endorses an associated "burning sensation in that foot.  She has chronic neuropathy but denies any new numbness, weakness or calf swelling.  She was fully dialyzed yesterday.        Review of patient's allergies indicates:   Allergen Reactions    Cyclobenzaprine Hallucinations    Erythromycin      Stomach upset     Past Medical History:   Diagnosis Date    Anxiety     Arthritis     DDD, Lumbar    Breast cancer 1/2013    left breast- invasive mammary carcinoma, ER/MO positive, Her2 negative    Carotid artery stenosis 8/13/2018 6/22/2018: Carotid Duplex: SHANELL: Moderate plaquing - 2.0 m/s - <50%. LICA: Moderate plaquing - 1.6 m/s - <50%.    Cataract     Choledocholithiasis     s/p ERCP and stent placement    Chronic obstructive pulmonary disease 6/8/2018    Chronic venous insufficiency     Colon cancer 2001    CRI (chronic renal insufficiency)     one kidney    Dialysis AV fistula malfunction     ESRD (end stage renal disease) on dialysis     Former smoker 6/8/2018    GERD (gastroesophageal reflux disease)     History of acute pancreatitis 2009 2/09 s/p sphincterotomy    History of cerebrovascular accident 6/8/2018    History of colon cancer     s/p sigmoid colectomy    HTN (hypertension), benign     Hypercholesterolemia     " Hyperlipidemia     Hypoxemia 6/8/2018    Intracerebral hemorrhage     Kidney stone     left    Lymphedema of both lower extremities     Obesity     Pancreatitis     Pancreatitis 2008    Physical deconditioning     Pulmonary edema     Sacroiliac joint pain     Spinal stenosis     Spinal stenosis, lumbar     Stroke 12/2012    Tobacco abuse     Type 2 diabetes mellitus with neurological manifestations, controlled     Neuropathy     Past Surgical History:   Procedure Laterality Date    ANGIOGRAPHY OF LOWER EXTREMITY Right 9/17/2020    Procedure: Angiogram Extremity Unilateral;  Surgeon: RICK Pollard III, MD;  Location: [facility] OR [address] [place] [address];  Service: Peripheral Vascular;  Laterality: Right;  6.2 minutes of fluro  690.88 mGy  112.64 Gycm2  55 ml    AV FISTULA PLACEMENT Right 5/28/2018    Procedure: CREATION -FISTULA-AV;  Surgeon: RICK Pollard III, MD;  Location: [facility] OR [address] [place] [address];  Service: Peripheral Vascular;  Laterality: Right;    BREAST BIOPSY  1/2012    left breast invasive mammary carcinoma (lateral bx) and intraductal papilloma (medial bx)    BREAST BIOPSY  1999    rt breast FA    CATARACT EXTRACTION W/  INTRAOCULAR LENS IMPLANT Right 1/24/2019        CATARACT EXTRACTION W/  INTRAOCULAR LENS IMPLANT Left 1/31/2019    Procedure: EXTRACTION, CATARACT, WITH IOL INSERTION;  Surgeon: Immanuel Moncada MD;  Location: [facility];  Service: Ophthalmology;  Laterality: Left;    CHOLECYSTECTOMY  2001    COLON SURGERY      HERNIA REPAIR      left nephrectomy      atrophic kidney and hydronephrosis    left oopherectomy  2001    MASTECTOMY  2013    left    NEPHRECTOMY  2011    lap    REVISION OF ARTERIOVENOUS FISTULA Right 8/15/2018    Procedure: REVISION, AV FISTULA;  Surgeon: RICK Pollard III, MD;  Location: [address];  Service: Peripheral Vascular;  Laterality: Right;    SIGMOIDECTOMY  2001    TOTAL REDUCTION MAMMOPLASTY Right 2013     Family History   Problem Relation Age of Onset     Arthritis Mother     Heart disease Mother     Diabetes Father     Heart disease Father     Celiac disease Sister     Breast cancer Maternal Grandmother         possible-  patient unsure    Cancer Maternal Grandmother     Heart disease Maternal Grandmother     Cancer Maternal Aunt     Ovarian cancer Neg Hx     Glaucoma Neg Hx     Macular degeneration Neg Hx      Social History     Tobacco Use    Smoking status: Current Some Day Smoker     Packs/day: 0.50     Years: 28.00     Pack years: 14.00     Types: Cigarettes     Start date: 1990     Last attempt to quit: 2018     Years since quittin.7    Smokeless tobacco: Never Used    Tobacco comment: anxious   Substance Use Topics    Alcohol use: No     Frequency: Never     Drinks per session: Patient refused     Binge frequency: Never    Drug use: No     Review of Systems   Constitutional: Positive for chills and fever.   HENT: Negative for sore throat.    Respiratory: Negative for shortness of breath.    Cardiovascular: Negative for chest pain.   Gastrointestinal: Negative for nausea.   Genitourinary: Negative for dysuria.   Musculoskeletal: Positive for arthralgias. Negative for back pain.   Skin: Positive for wound. Negative for pallor and rash.   Neurological: Negative for weakness.   Hematological: Does not bruise/bleed easily.       Physical Exam     Initial Vitals [10/13/20 1426]   BP Pulse Resp Temp SpO2   (!) 173/10 84 18 99.6 °F (37.6 °C) 95 %      MAP       --         Physical Exam    Nursing note and vitals reviewed.  Constitutional: She is Obese .   HENT:   Head: Normocephalic and atraumatic.   Eyes: EOM are normal. Pupils are equal, round, and reactive to light.   Neck: Normal range of motion. Neck supple.   Cardiovascular: Normal rate, regular rhythm, normal heart sounds and intact distal pulses. Exam reveals no gallop and no friction rub.    No murmur heard.  Pulmonary/Chest: Breath sounds normal. No respiratory distress. She  has no wheezes. She has no rhonchi. She has no rales. She exhibits no tenderness.   Musculoskeletal: Normal range of motion.        Right foot: Comments: Tip of right great toe is black.  There is foul smelling, purulent drainage on the base of the toe.  Decreased pedal pulses bilaterally but foot is warm to the touch.   Neurological: She is alert and oriented to person, place, and time.   Skin: Skin is warm and dry.   Psychiatric: She has a normal mood and affect.         ED Course   Procedures  Labs Reviewed   CBC W/ AUTO DIFFERENTIAL - Abnormal; Notable for the following components:       Result Value    RBC 3.64 (*)     Hemoglobin 10.2 (*)     Hematocrit 33.8 (*)     Mean Corpuscular Hemoglobin Conc 30.2 (*)     RDW 19.1 (*)     All other components within normal limits   COMPREHENSIVE METABOLIC PANEL - Abnormal; Notable for the following components:    Sodium 127 (*)     Chloride 86 (*)     Glucose 325 (*)     BUN, Bld 47 (*)     Creatinine 7.2 (*)     Albumin 3.0 (*)     Anion Gap 17 (*)     eGFR if  6.2 (*)     eGFR if non  5.4 (*)     All other components within normal limits   SEDIMENTATION RATE - Abnormal; Notable for the following components:    Sed Rate 85 (*)     All other components within normal limits   C-REACTIVE PROTEIN - Abnormal; Notable for the following components:    .6 (*)     All other components within normal limits   CULTURE, BLOOD   CULTURE, BLOOD   LACTIC ACID, PLASMA   SARS-COV-2 RDRP GENE    Narrative:     This test utilizes isothermal nucleic acid amplification   technology to detect the SARS-CoV-2 RdRp nucleic acid segment.   The analytical sensitivity (limit of detection) is 125 genome   equivalents/mL.   A POSITIVE result implies infection with the SARS-CoV-2 virus;   the patient is presumed to be contagious.     A NEGATIVE result means that SARS-CoV-2 nucleic acids are not   present above the limit of detection. A NEGATIVE result should be    treated as presumptive. It does not rule out the possibility of   COVID-19 and should not be the sole basis for treatment decisions.   If COVID-19 is strongly suspected based on clinical and exposure   history, re-testing using an alternate molecular assay should be   considered.   This test is only for use under the Food and Drug   Administration s Emergency Use Authorization (EUA).   Commercial kits are provided by [employer].   Performance characteristics of the EUA have been independently   verified by [facility] Department of   Pathology and Laboratory Medicine.   _________________________________________________________________   The authorized Fact Sheet for Healthcare Providers and the authorized Fact   Sheet for Patients of the ID NOW COVID-19 are available on the FDA   website:     https://www.fda.gov/media/113599/download  https://www.fda.gov/media/341747/download       SARS-COV-2 RDRP GENE     EKG Readings: (Independently Interpreted)   Initial Reading: No STEMI. Previous EKG: Compared with most recent EKG Previous EKG Date: 9/4/2020. Ectopy: No Ectopy.   Indeterminate rhythm, likely sinus rhythm with poor baseline with rate of 68.  No peaked T-waves or ST changes       Imaging Results          X-Ray Foot Complete Right (Final result)  Result time 10/13/20 16:47:18    Final result by Lisandro Camacho MD (10/13/20 16:47:18)                 Impression:      1. Fractures as described above, somewhat limited secondary to overlying bandaging material and osteopenia.  Please note, given the appearance of the distal aspect of the proximal phalanx of the great toe, superimposed infection is not excluded and correlation is needed, particularly given the large degree of edema.  Correlation is advised.  Consider follow-up radiography to exclude additional fracture.      Electronically signed by: Lisandro Camacho MD  Date:    10/13/2020  Time:    16:47             Narrative:     EXAMINATION:  XR FOOT COMPLETE 3 VIEW RIGHT    CLINICAL HISTORY:  . Other injury of unspecified body region, initial encounter    TECHNIQUE:  AP, lateral, and oblique views of the right foot were performed.    COMPARISON:  09/03/2020    FINDINGS:  Three views right foot.    There is diffuse osteopenia.  There is impacted fracture involving the distal aspect of the proximal phalanx of the great toe, fracture planes appear to involve the physeal surface.  There is questionable foreshortening of the proximal phalanx of the 2nd toe, concerning for additional fracture although evaluation is limited.  There is fracture involving the distal aspects of the 2nd, 3rd, and 4th metatarsals.  There is remote fracture involving the 5th metatarsal.  There is diffuse edema about the foot.  No dislocation.  There is edema about the dorsal aspect of the foot.  Degenerative changes are noted of the calcaneus.  There is vascular calcification.                                 Medical Decision Making:   History:   Old Medical Records: I decided to obtain old medical records.  Old Records Summarized: records from clinic visits and records from previous admission(s).       <> Summary of Records: Patient underwent left SFA stent placement 09/17/2020 with Dr. Pollard for a gangrenous right great toe.    She was seen in podiatry clinic earlier today and was instructed to come to the ED.  Initial Assessment:   65-year-old female presenting for infected right great toe.  She is hypertensive at 173/110 (note, this was incorrectly entered as 173/10) otherwise normal vitals.  She has a grossly infected great toe with purulent, foul-smelling drainage.  Tip of the toe is gangrenous and black.  Differential Diagnosis:   Infected diabetic ulcer  Osteomyelitis  Lower suspicion for abscess  Patient is endorsing some systemic symptoms but clinically she does not appear septic  Independently Interpreted Test(s):   I have ordered and independently  interpreted X-rays - see summary below.       <> Summary of X-Ray Reading(s): Impacted fracture distal aspect proximal phalanx  I have ordered and independently interpreted EKG Reading(s) - see prior notes  Clinical Tests:   Lab Tests: Ordered and Reviewed  Radiological Study: Ordered and Reviewed  Medical Tests: Ordered and Reviewed  ED Management:  Will check labs, do foot x-ray, consult Podiatry and reassess.    Labs notable for elevated inflammatory markers, no leukocytosis.  Chronic hyponatremia with sodium of 127.  Potassium 4.5, BUN/creatinine elevated at baseline of 47/7.2. I discussed this patient with podiatry who recommended admission to Hospital Medicine for IV antibiotics followed by likely amputation. Will give Zosyn, vancomycin and admitted to Hospital Medicine for further management.  Patient comfortable admission.  I discussed this patient with my supervising physician.  Other:   I have discussed this case with another health care provider.       <> Summary of the Discussion: I discussed this patient with podiatry who recommended admission to Hospital Medicine for IV antibiotics followed by likely amputation.                   ED Course as of Oct 13 1907   Tue Oct 13, 2020   1749 CBC auto differential(!) [CC]   1749 Lactic acid, plasma [CC]      ED Course User Index  [CC] Annalise David PA-C            Clinical Impression:       ICD-10-CM ICD-9-CM   1. Wound infection  T14.8XXA 958.3    L08.9    2. Hemodialysis patient  Z99.2 V45.11   3. Dry gangrene  I96 785.4   4. Diabetic ulcer of right great toe  E11.621 250.80    L97.519 707.15                      Disposition:   Disposition: Admitted  Condition: Fair     ED Disposition Condition    Admit                             Annalise David PA-C  10/13/20 1907

## 2020-10-13 NOTE — ED NOTES
Appearance:  Pt awake, alert & oriented to person, place & time.  Pt in no acute distress at present time.  Skin:  Skin warm, dry & intact.  Mucous membranes moist.  Skin turgor normal.    Respiratory:  Respirations even, non-labored.    Neurologic:  Pt moving all extremities without difficulty.  Sensation intact.     Peripheral Vascular:  All peripheral pulses present.   Musculoskeletal:  Right great toe appears necrotic.  Right 2nd toe erythema noted.

## 2020-10-13 NOTE — ED NOTES
Pharmacist made aware of vancomycin being out of stock in hospitals.  States he will take care of order.

## 2020-10-13 NOTE — ED TRIAGE NOTES
"Pt sent for worsened wound infection to right big toe.  Pt has been getting treatment for same but states "it looks like it is falling off"  "

## 2020-10-14 ENCOUNTER — ANESTHESIA EVENT (OUTPATIENT)
Dept: SURGERY | Facility: HOSPITAL | Age: 65
DRG: 617 | End: 2020-10-14
Payer: MEDICARE

## 2020-10-14 ENCOUNTER — IMMUNIZATION (OUTPATIENT)
Dept: PHARMACY | Facility: CLINIC | Age: 65
End: 2020-10-14
Payer: COMMERCIAL

## 2020-10-14 PROBLEM — E87.1 HYPONATREMIA: Status: ACTIVE | Noted: 2020-10-14

## 2020-10-14 PROBLEM — E83.39 HYPERPHOSPHATEMIA: Status: ACTIVE | Noted: 2020-10-14

## 2020-10-14 LAB
ALBUMIN SERPL BCP-MCNC: 2.9 G/DL (ref 3.5–5.2)
ALP SERPL-CCNC: 58 U/L (ref 55–135)
ALT SERPL W/O P-5'-P-CCNC: 27 U/L (ref 10–44)
ANION GAP SERPL CALC-SCNC: 20 MMOL/L (ref 8–16)
AST SERPL-CCNC: 19 U/L (ref 10–40)
BASOPHILS # BLD AUTO: 0.04 K/UL (ref 0–0.2)
BASOPHILS NFR BLD: 0.6 % (ref 0–1.9)
BILIRUB SERPL-MCNC: 0.6 MG/DL (ref 0.1–1)
BUN SERPL-MCNC: 55 MG/DL (ref 8–23)
CALCIUM SERPL-MCNC: 8.5 MG/DL (ref 8.7–10.5)
CHLORIDE SERPL-SCNC: 86 MMOL/L (ref 95–110)
CO2 SERPL-SCNC: 24 MMOL/L (ref 23–29)
CREAT SERPL-MCNC: 7.7 MG/DL (ref 0.5–1.4)
DIFFERENTIAL METHOD: ABNORMAL
EOSINOPHIL # BLD AUTO: 0.2 K/UL (ref 0–0.5)
EOSINOPHIL NFR BLD: 3.1 % (ref 0–8)
ERYTHROCYTE [DISTWIDTH] IN BLOOD BY AUTOMATED COUNT: 19.1 % (ref 11.5–14.5)
EST. GFR  (AFRICAN AMERICAN): 5.8 ML/MIN/1.73 M^2
EST. GFR  (NON AFRICAN AMERICAN): 5 ML/MIN/1.73 M^2
FOLATE SERPL-MCNC: 8.5 NG/ML (ref 4–24)
GLUCOSE SERPL-MCNC: 286 MG/DL (ref 70–110)
HCT VFR BLD AUTO: 34 % (ref 37–48.5)
HGB BLD-MCNC: 10.2 G/DL (ref 12–16)
IMM GRANULOCYTES # BLD AUTO: 0.03 K/UL (ref 0–0.04)
IMM GRANULOCYTES NFR BLD AUTO: 0.5 % (ref 0–0.5)
INR PPP: 1 (ref 0.8–1.2)
IRON SERPL-MCNC: 68 UG/DL (ref 30–160)
LYMPHOCYTES # BLD AUTO: 1.3 K/UL (ref 1–4.8)
LYMPHOCYTES NFR BLD: 20.6 % (ref 18–48)
MAGNESIUM SERPL-MCNC: 1.9 MG/DL (ref 1.6–2.6)
MCH RBC QN AUTO: 27.5 PG (ref 27–31)
MCHC RBC AUTO-ENTMCNC: 30 G/DL (ref 32–36)
MCV RBC AUTO: 92 FL (ref 82–98)
MONOCYTES # BLD AUTO: 0.8 K/UL (ref 0.3–1)
MONOCYTES NFR BLD: 12.2 % (ref 4–15)
NEUTROPHILS # BLD AUTO: 4 K/UL (ref 1.8–7.7)
NEUTROPHILS NFR BLD: 63 % (ref 38–73)
NRBC BLD-RTO: 0 /100 WBC
PHOSPHATE SERPL-MCNC: 7.1 MG/DL (ref 2.7–4.5)
PLATELET # BLD AUTO: 249 K/UL (ref 150–350)
PMV BLD AUTO: 11.5 FL (ref 9.2–12.9)
POCT GLUCOSE: 198 MG/DL (ref 70–110)
POCT GLUCOSE: 224 MG/DL (ref 70–110)
POCT GLUCOSE: 317 MG/DL (ref 70–110)
POTASSIUM SERPL-SCNC: 4.5 MMOL/L (ref 3.5–5.1)
PROT SERPL-MCNC: 7.2 G/DL (ref 6–8.4)
PROTHROMBIN TIME: 11.3 SEC (ref 9–12.5)
RBC # BLD AUTO: 3.71 M/UL (ref 4–5.4)
SATURATED IRON: 36 % (ref 20–50)
SODIUM SERPL-SCNC: 130 MMOL/L (ref 136–145)
TOTAL IRON BINDING CAPACITY: 189 UG/DL (ref 250–450)
TRANSFERRIN SERPL-MCNC: 128 MG/DL (ref 200–375)
VANCOMYCIN SERPL-MCNC: 29.5 UG/ML
VIT B12 SERPL-MCNC: 278 PG/ML (ref 210–950)
WBC # BLD AUTO: 6.37 K/UL (ref 3.9–12.7)

## 2020-10-14 PROCEDURE — 82746 ASSAY OF FOLIC ACID SERUM: CPT

## 2020-10-14 PROCEDURE — 85025 COMPLETE CBC W/AUTO DIFF WBC: CPT

## 2020-10-14 PROCEDURE — 90935 PR HEMODIALYSIS, ONE EVALUATION: ICD-10-PCS | Mod: ,,, | Performed by: NURSE PRACTITIONER

## 2020-10-14 PROCEDURE — 80202 ASSAY OF VANCOMYCIN: CPT

## 2020-10-14 PROCEDURE — 82607 VITAMIN B-12: CPT

## 2020-10-14 PROCEDURE — 85610 PROTHROMBIN TIME: CPT

## 2020-10-14 PROCEDURE — 36415 COLL VENOUS BLD VENIPUNCTURE: CPT

## 2020-10-14 PROCEDURE — 63600175 PHARM REV CODE 636 W HCPCS: Performed by: STUDENT IN AN ORGANIZED HEALTH CARE EDUCATION/TRAINING PROGRAM

## 2020-10-14 PROCEDURE — 99223 PR INITIAL HOSPITAL CARE,LEVL III: ICD-10-PCS | Mod: 25,,, | Performed by: INTERNAL MEDICINE

## 2020-10-14 PROCEDURE — 80100016 HC MAINTENANCE HEMODIALYSIS

## 2020-10-14 PROCEDURE — 99223 1ST HOSP IP/OBS HIGH 75: CPT | Mod: 25,,, | Performed by: INTERNAL MEDICINE

## 2020-10-14 PROCEDURE — 25000242 PHARM REV CODE 250 ALT 637 W/ HCPCS: Performed by: STUDENT IN AN ORGANIZED HEALTH CARE EDUCATION/TRAINING PROGRAM

## 2020-10-14 PROCEDURE — 90935 HEMODIALYSIS ONE EVALUATION: CPT | Mod: ,,, | Performed by: NURSE PRACTITIONER

## 2020-10-14 PROCEDURE — 83735 ASSAY OF MAGNESIUM: CPT

## 2020-10-14 PROCEDURE — 83540 ASSAY OF IRON: CPT

## 2020-10-14 PROCEDURE — 80053 COMPREHEN METABOLIC PANEL: CPT

## 2020-10-14 PROCEDURE — 25000003 PHARM REV CODE 250: Performed by: STUDENT IN AN ORGANIZED HEALTH CARE EDUCATION/TRAINING PROGRAM

## 2020-10-14 PROCEDURE — C9399 UNCLASSIFIED DRUGS OR BIOLOG: HCPCS | Performed by: STUDENT IN AN ORGANIZED HEALTH CARE EDUCATION/TRAINING PROGRAM

## 2020-10-14 PROCEDURE — 11000001 HC ACUTE MED/SURG PRIVATE ROOM

## 2020-10-14 PROCEDURE — 84100 ASSAY OF PHOSPHORUS: CPT

## 2020-10-14 RX ADMIN — ASPIRIN 81 MG: 81 TABLET, COATED ORAL at 01:10

## 2020-10-14 RX ADMIN — INSULIN DETEMIR 14 UNITS: 100 INJECTION, SOLUTION SUBCUTANEOUS at 01:10

## 2020-10-14 RX ADMIN — FLUTICASONE PROPIONATE 100 MCG: 50 SPRAY, METERED NASAL at 09:10

## 2020-10-14 RX ADMIN — INSULIN ASPART 4 UNITS: 100 INJECTION, SOLUTION INTRAVENOUS; SUBCUTANEOUS at 05:10

## 2020-10-14 RX ADMIN — INSULIN DETEMIR 14 UNITS: 100 INJECTION, SOLUTION SUBCUTANEOUS at 10:10

## 2020-10-14 RX ADMIN — ATORVASTATIN CALCIUM 40 MG: 20 TABLET, FILM COATED ORAL at 01:10

## 2020-10-14 RX ADMIN — CARVEDILOL 25 MG: 25 TABLET, FILM COATED ORAL at 01:10

## 2020-10-14 RX ADMIN — HYDRALAZINE HYDROCHLORIDE 25 MG: 25 TABLET, FILM COATED ORAL at 09:10

## 2020-10-14 RX ADMIN — HYDRALAZINE HYDROCHLORIDE 25 MG: 25 TABLET, FILM COATED ORAL at 01:10

## 2020-10-14 RX ADMIN — AMLODIPINE BESYLATE 10 MG: 10 TABLET ORAL at 09:10

## 2020-10-14 RX ADMIN — LETROZOLE 2.5 MG: 2.5 TABLET ORAL at 09:10

## 2020-10-14 RX ADMIN — INSULIN ASPART 1 UNITS: 100 INJECTION, SOLUTION INTRAVENOUS; SUBCUTANEOUS at 10:10

## 2020-10-14 RX ADMIN — CARVEDILOL 25 MG: 25 TABLET, FILM COATED ORAL at 09:10

## 2020-10-14 RX ADMIN — INSULIN ASPART 10 UNITS: 100 INJECTION, SOLUTION INTRAVENOUS; SUBCUTANEOUS at 05:10

## 2020-10-14 RX ADMIN — GABAPENTIN 400 MG: 400 CAPSULE ORAL at 09:10

## 2020-10-14 RX ADMIN — CITALOPRAM HYDROBROMIDE 20 MG: 20 TABLET ORAL at 09:10

## 2020-10-14 NOTE — PROGRESS NOTES
Pharmacokinetic Initial Assessment: IV Vancomycin    Assessment/Plan:    Patient received a 2000 mg dose of IV vancomycin the ED on 10/13 at 1839  Patient undergoes HD MWF per previous notes  Provided the patients HD schedule has not changed, plan for no further doses today as she will be dialyzed tomorrow  Pharmacy to enter a vancomycin dose after HD  A trough will be scheduled as patient receives dialysis sessions and vancomycin doses  Desired empiric serum trough concentration is 15 to 20 mcg/mL  Pharmacy will continue to follow and monitor vancomycin.      Please contact pharmacy at extension [phone] with any questions regarding this assessment.     Thank you for the consult,   Lyle Mccracken, PharmD, BCPS     Patient brief summary:  Kylie King is a 65 y.o. female initiated on antimicrobial therapy with IV Vancomycin for treatment of suspected bacteremia    Drug Allergies:   Review of patient's allergies indicates:   Allergen Reactions    Cyclobenzaprine Hallucinations    Erythromycin      Stomach upset       Actual Body Weight:   124 kg    Renal Function:   Estimated Creatinine Clearance: 10 mL/min (A) (based on SCr of 7.2 mg/dL (H)).,     Dialysis Method (if applicable):  intermittent HD    CBC (last 72 hours):  Recent Labs   Lab Result Units 10/13/20  1706   WBC K/uL 5.93   Hemoglobin g/dL 10.2*   Hematocrit % 33.8*   Platelets K/uL 230   Gran% % 60.4   Lymph% % 22.4   Mono% % 13.8   Eosinophil% % 2.4   Basophil% % 0.5   Differential Method  Automated       Metabolic Panel (last 72 hours):  Recent Labs   Lab Result Units 10/13/20  1706   Sodium mmol/L 127*   Potassium mmol/L 4.5   Chloride mmol/L 86*   CO2 mmol/L 24   Glucose mg/dL 325*   BUN, Bld mg/dL 47*   Creatinine mg/dL 7.2*   Albumin g/dL 3.0*   Total Bilirubin mg/dL 0.5   Alkaline Phosphatase U/L 63   AST U/L 22   ALT U/L 28       Drug levels (last 3 results):  No results for input(s): VANCOMYCINRA, VANCOMYCINPE, VANCOMYCINTR in the last 72  hours.    Microbiologic Results:  Microbiology Results (last 7 days)     Procedure Component Value Units Date/Time    Blood culture #1 **CANNOT BE ORDERED STAT** [[id number]] Collected: 10/13/20 1708    Order Status: Sent Specimen: Blood from Peripheral, Forearm, Left Updated: 10/13/20 1720    Blood culture #2 **CANNOT BE ORDERED STAT** [[id number]] Collected: 10/13/20 1716    Order Status: Sent Specimen: Blood from Peripheral, Antecubital, Left Updated: 10/13/20 1720

## 2020-10-14 NOTE — ASSESSMENT & PLAN NOTE
Last Hemoglobin A1C 8.8 on 9/3  Home regimen: Home meds: levemir 45U BID, Novolog 15U TIDWM    Plan:   - Hold home meds   - Glucose goals while inpatient 140-180s  - Glucose checks q6   - Start insulin 14 units bid + 10 Us TID WM with LDSS >> Adjust as needed   - Diet: Diabetic/Renal   - She needs a follow up with Endocrinology, diabetic diet education

## 2020-10-14 NOTE — H&P
"[facility] Hospital Medicine  History & Physical    Patient Name: Kylie King  MRN: [id number]  Admission Date: 10/13/2020  Attending Physician: Gilles Hogue MD   Primary Care Provider: Virginia Foote MD    Hospital Medicine Team: Networked reference to record PCT  Mari Aponte MD     Patient information was obtained from patient, past medical records and ER records.     Subjective:     Principal Problem:Toe infection    Chief Complaint:   Chief Complaint   Patient presents with    Wound Infection     right foot wound; might need to have toe removed        HPI: Ms. King is a 63 yo F with T2DM, ESRD on HD (MWF), HFpEF (EF 50-55%), HTN, HLD, COPD, T2DM, chronic venous insufficiency, and anxiety/depression that presents for worsening R foot wound. She has multiple admission recently, for same complains, treated with course of Vanc and cefepime (stopped 9/24) she is s/p SFA stenting on 09/17/2020  for a completely occluded SFA on aspirin and plavix. She reports for the past 3 weeks her symptoms worsening, her foot was continuing to hurt to the point prompt her the visit the ED. The past few days she has noticed some foul-smelling discharge from the foot and has had fever at home with T-max of 101.8° F. She endorses an associated "burning sensation in that foot.  She has chronic neuropathy but denies any new numbness, weakness or calf swelling.  She was fully dialyzed yesterday. Another ROS negatives. She lives with a roommate. Report compliance with her medications and dialysis. She smokes cigarettes occasionally, denied alcohol drinking or IVDA.      Past Medical History:   Diagnosis Date    Anxiety     Arthritis     DDD, Lumbar    Breast cancer 1/2013    left breast- invasive mammary carcinoma, ER/KS positive, Her2 negative    Carotid artery stenosis 8/13/2018 6/22/2018: Carotid Duplex: SHANELL: Moderate plaquing - 2.0 m/s - <50%. LICA: Moderate plaquing - 1.6 m/s - <50%.    " Cataract     Choledocholithiasis     s/p ERCP and stent placement    Chronic obstructive pulmonary disease 6/8/2018    Chronic venous insufficiency     Colon cancer 2001    CRI (chronic renal insufficiency)     one kidney    Dialysis AV fistula malfunction     ESRD (end stage renal disease) on dialysis     Former smoker 6/8/2018    GERD (gastroesophageal reflux disease)     History of acute pancreatitis 2009 2/09 s/p sphincterotomy    History of cerebrovascular accident 6/8/2018    History of colon cancer     s/p sigmoid colectomy    HTN (hypertension), benign     Hypercholesterolemia     Hyperlipidemia     Hypoxemia 6/8/2018    Intracerebral hemorrhage     Kidney stone     left    Lymphedema of both lower extremities     Obesity     Pancreatitis     Pancreatitis 2008    Physical deconditioning     Pulmonary edema     Sacroiliac joint pain     Spinal stenosis     Spinal stenosis, lumbar     Stroke 12/2012    Tobacco abuse     Type 2 diabetes mellitus with neurological manifestations, controlled     Neuropathy       Past Surgical History:   Procedure Laterality Date    ANGIOGRAPHY OF LOWER EXTREMITY Right 9/17/2020    Procedure: Angiogram Extremity Unilateral;  Surgeon: RICK Pollard III, MD;  Location: [facility] OR [address] [place] [address];  Service: Peripheral Vascular;  Laterality: Right;  6.2 minutes of fluro  690.88 mGy  112.64 Gycm2  55 ml    AV FISTULA PLACEMENT Right 5/28/2018    Procedure: CREATION -FISTULA-AV;  Surgeon: RICK Pollard III, MD;  Location: [facility] OR [address] [place] [address];  Service: Peripheral Vascular;  Laterality: Right;    BREAST BIOPSY  1/2012    left breast invasive mammary carcinoma (lateral bx) and intraductal papilloma (medial bx)    BREAST BIOPSY  1999    rt breast FA    CATARACT EXTRACTION W/  INTRAOCULAR LENS IMPLANT Right 1/24/2019        CATARACT EXTRACTION W/  INTRAOCULAR LENS IMPLANT Left 1/31/2019    Procedure: EXTRACTION, CATARACT, WITH IOL INSERTION;  Surgeon:  "Immanuel Moncada MD;  Location: [facility];  Service: Ophthalmology;  Laterality: Left;    CHOLECYSTECTOMY  2001    COLON SURGERY      HERNIA REPAIR      left nephrectomy      atrophic kidney and hydronephrosis    left oopherectomy  2001    MASTECTOMY  2013    left    NEPHRECTOMY  2011    lap    REVISION OF ARTERIOVENOUS FISTULA Right 8/15/2018    Procedure: REVISION, AV FISTULA;  Surgeon: RICK Pollard III, MD;  Location: [address];  Service: Peripheral Vascular;  Laterality: Right;    SIGMOIDECTOMY  2001    TOTAL REDUCTION MAMMOPLASTY Right 2013       Review of patient's allergies indicates:   Allergen Reactions    Cyclobenzaprine Hallucinations    Erythromycin      Stomach upset       Current Facility-Administered Medications on File Prior to Encounter   Medication    lidocaine (PF) 10 mg/ml (1%) injection 10 mg    mupirocin 2 % ointment    mupirocin 2 % ointment    [DISCONTINUED] sodium chloride 0.9% flush 10 mL     Current Outpatient Medications on File Prior to Encounter   Medication Sig    acetaminophen (TYLENOL) 325 MG tablet Take 650 mg by mouth every 6 (six) hours as needed for mild pain 1-3/10 pain scale.    acetaminophen-codeine 300-30mg (TYLENOL #3) 300-30 mg Tab Take 1 tablet by mouth every 6 (six) hours as needed.    albuterol (PROVENTIL/VENTOLIN HFA) 90 mcg/actuation inhaler Inhale 2 puffs into the lungs every 6 (six) hours as needed for Wheezing. Rescue    amLODIPine (NORVASC) 10 MG tablet Take 1 tablet (10 mg total) by mouth every morning. (Patient taking differently: Take 10 mg by mouth every evening. )    ammonium lactate 12 % Crea Apply twice daily to affected parts both feet as needed.    aspirin (ECOTRIN) 81 MG EC tablet Take 1 tablet (81 mg total) by mouth once daily.    atorvastatin (LIPITOR) 40 MG tablet Take 1 tablet (40 mg total) by mouth once daily.    BD INSULIN PEN NEEDLE UF MINI 31 gauge x 3/16" Ndle USE 5 NEEDLES PER DAY    carvediloL (COREG) 25 MG tablet " Take 1 tablet (25 mg total) by mouth 2 (two) times daily.    citalopram (CELEXA) 20 MG tablet Take 1 tablet (20 mg total) by mouth nightly.    clopidogreL (PLAVIX) 75 mg tablet Take 1 tablet (75 mg total) by mouth once daily.    diclofenac sodium (VOLTAREN) 1 % Gel Apply 2 grams topically 4 (four) times daily.    diphth,pertus,acell,,tetanus (BOOSTRIX TDAP) 2.5-8-5 Lf-mcg-Lf/0.5mL Syrg injection Inject 0.5 mLs into the muscle once. For one dose. for 1 dose    famotidine (PEPCID) 20 MG tablet Take 1 tablet (20 mg total) by mouth nightly as needed.    fish oil-omega-3 fatty acids 300-1,000 mg capsule Take 1 capsule by mouth every morning.    fluticasone (FLONASE) 50 mcg/actuation nasal spray 2 sprays (100 mcg total) by Each Nare route once daily.    gabapentin (NEURONTIN) 400 MG capsule Take 1 capsule (400 mg total) by mouth every evening.    honey (MEDIHONEY, HONEY,) 80 % Gel Apply 1 application topically 3 (three) times daily.    hydrALAZINE (APRESOLINE) 25 MG tablet Take 1 tablet (25 mg total) by mouth 2 (two) times daily.    insulin detemir U-100 (LEVEMIR) 100 unit/mL injection Inject 45 Units into the skin 2 (two) times daily.    insulin lispro (HUMALOG KWIKPEN INSULIN) 100 unit/mL pen BLOOD GLUCOSE 150-200, INJECT 1 UNITS UNDER THE SKIN, 201-250, 2 UNITS, 251-300, 3 UNITS THREE TIMES A DAY AS DIRECTED    insulin lispro (HUMALOG KWIKPEN INSULIN) 100 unit/mL pen BLOOD GLUCOSE 150-200, INJECT 15 UNITS UNDER THE SKIN, 201-250, 18 UNITS, 251-300, 20 UNITS THREE TIMES A DAY AS DIRECTED    insulin lispro (HUMALOG KWIKPEN INSULIN) 100 unit/mL pen BLOOD GLUCOSE 150-200, INJECT 1 UNITS UNDER THE SKIN, 201-250, 2 UNITS, 251-300, 3 UNITS THREE TIMES A DAY AS DIRECTED    insulin needles, disposable, (PEN NEEDLE, DIABETIC) 31 Ndle Patient needs 5 needles a day    iron polysacch complex-b12-fa 150-25-1 mg-mcg-mg (FERREX 150 FORTE) 150-25-1 mg-mcg-mg Cap Take 150 mg by mouth once daily.    ketoconazole  "(NIZORAL) 2 % cream Apply topically once daily.    lancets (ONETOUCH DELICA LANCETS) 33 gauge Misc 1 lancet by Misc.(Non-Drug; Combo Route) route 4 (four) times daily before meals and nightly.    letrozole (FEMARA) 2.5 mg Tab Take 1 tablet (2.5 mg total) by mouth nightly.    pen needle, diabetic 32 gauge x 5/32" Ndle 1 Device by Misc.(Non-Drug; Combo Route) route 5 (five) times daily.     Family History     Problem Relation (Age of Onset)    Arthritis Mother    Breast cancer Maternal Grandmother    Cancer Maternal Grandmother, Maternal Aunt    Celiac disease Sister    Diabetes Father    Heart disease Mother, Father, Maternal Grandmother        Tobacco Use    Smoking status: Current Some Day Smoker     Packs/day: 0.50     Years: 28.00     Pack years: 14.00     Types: Cigarettes     Start date: 1990     Last attempt to quit: 2018     Years since quittin.7    Smokeless tobacco: Never Used    Tobacco comment: anxious   Substance and Sexual Activity    Alcohol use: No     Frequency: Never     Drinks per session: Patient refused     Binge frequency: Never    Drug use: No    Sexual activity: Never     Partners: Male     Review of Systems   Constitutional: Positive for activity change, chills and fever. Negative for appetite change.   HENT: Negative for congestion, sore throat and trouble swallowing.    Eyes: Negative for visual disturbance.   Respiratory: Negative for cough, choking, shortness of breath and wheezing.    Cardiovascular: Positive for leg swelling. Negative for chest pain and palpitations.   Gastrointestinal: Negative for abdominal distention, blood in stool, constipation, diarrhea, nausea and vomiting.   Genitourinary: Negative for dysuria.   Musculoskeletal: Positive for gait problem. Negative for back pain.   Skin: Positive for color change and wound (Right leg ).   Neurological: Negative for light-headedness and headaches.   Psychiatric/Behavioral: Negative for agitation. "     Objective:     Vital Signs (Most Recent):  Temp: 99.6 °F (37.6 °C) (10/13/20 1426)  Pulse: 76 (10/13/20 1748)  Resp: 18 (10/13/20 1748)  BP: (!) 141/78 (10/13/20 1748)  SpO2: 95 % (10/13/20 1748) Vital Signs (24h Range):  Temp:  [97.7 °F (36.5 °C)-99.6 °F (37.6 °C)] 99.6 °F (37.6 °C)  Pulse:  [76-84] 76  Resp:  [18] 18  SpO2:  [95 %] 95 %  BP: (122-173)/(10-78) 141/78     Weight: 124 kg (273 lb 5.9 oz)  Body mass index is 46.92 kg/m².    Physical Exam  Constitutional:       Appearance: She is obese. She is ill-appearing.   HENT:      Head: Normocephalic and atraumatic.      Mouth/Throat:      Mouth: Mucous membranes are moist.   Eyes:      Pupils: Pupils are equal, round, and reactive to light.   Cardiovascular:      Rate and Rhythm: Normal rate.      Pulses: Normal pulses.   Pulmonary:      Effort: Pulmonary effort is normal. No respiratory distress.      Breath sounds: No wheezing.   Abdominal:      General: Bowel sounds are normal. There is no distension.      Palpations: Abdomen is soft.      Tenderness: There is no abdominal tenderness.   Musculoskeletal: Normal range of motion.         General: Deformity (RLE) present.      Right lower leg: Edema present.      Left lower leg: No edema.   Skin:     General: Skin is warm and dry.      Comments: Right lower extremity: Diffuse skin changes int he big toes, black in color, strong smell. Picture in the media. Venous stasis also noted in RLE  Pulse detected bilaterally with doppler US    Neurological:      Mental Status: She is alert and oriented to person, place, and time.   Psychiatric:         Mood and Affect: Mood normal.         Behavior: Behavior normal.               CRANIAL NERVES     CN III, IV, VI   Pupils are equal, round, and reactive to light.       Significant Labs:   Blood Culture: No results for input(s): LABBLOO in the last 48 hours.  CBC:   Recent Labs   Lab 10/13/20  1706   WBC 5.93   HGB 10.2*   HCT 33.8*        CMP:   Recent Labs   Lab  10/13/20  1706   *   K 4.5   CL 86*   CO2 24   *   BUN 47*   CREATININE 7.2*   CALCIUM 8.8   PROT 7.5   ALBUMIN 3.0*   BILITOT 0.5   ALKPHOS 63   AST 22   ALT 28   ANIONGAP 17*   EGFRNONAA 5.4*     Coagulation: No results for input(s): PT, INR, APTT in the last 48 hours.  All pertinent labs within the past 24 hours have been reviewed.    Significant Imaging: I have reviewed and interpreted all pertinent imaging results/findings within the past 24 hours.    Assessment/Plan:     * Right toe wound infection   Ms. King is a 65 yo F with T2DM, ESRD on HD (MWF), HFpEF (EF 50-55%), HTN, HLD, COPD, T2DM, chronic venous insufficiency, and anxiety/depression that presents for worsening R foot wound. She has multiple admission recently, for same complains, treated with course of Vanc and cefepime (stopped 9/24) she is s/p SFA stenting on 09/17/2020 for a completely occluded SFA.     Fever, worsen pain in the right toe, foul smell. likely gangrene of the right foot.   She was seen by podiatry on 10/1, wound debridement was done   Elevated inflammatory makers   Foot xray showed multiple fracture and possible superimposed infections     Ddx include gangrene, superimposed infection, osteomyelitis?     Plan:     - Will start broad spectrum Abx of vanc and cefepime given worsen the wound infections + fever   - Podiatry consulted appreciate recs, will hold on vascular consult at this time as Podiatry planning for amputation of the right toe  - Follow Blood Cx  - Trend lactate   - Npo after midnight for possible intervention   - Will obtain MRI for Osteomyelitis and further evaluation of the fracture>> follow the result   - Will hold Plavix tonight in case Podiatry will intervene tomorrow, resume pending recs tomorrow.     Normocytic anemia  Hb 10     Plan:   - Follow iron panel, folate and b12   - Daily CBC   - Transfuse for Hb < 7     Depression  Resume home Citalopram 20 mg QHS       ESRD (end stage renal disease)  on dialysis  HD MWF  Last session was Monday 10/12    Consult Nephrology for dialysis while in hospital     Debility  PT/OT       Morbid obesity  BMI:   Comorbidities include: DM, HTN, ESRD    Plan:   - The goal of therapy is to prevent, treat or reverse to complications of obesity and improve quality of life  - Patient would benefit from comprehensive life style modifications   - Weight loss education   - Encourage exercise   - Dietary modifications Consider consult Dietician for weight loss diet education, appreciate recs   - Consider referral to Bariatric medicine/surgery         DDD (degenerative disc disease), lumbar  Continue home Gabapentin     Diabetic peripheral neuropathy associated with type 2 diabetes mellitus  Continue home Gabapentin     Type 2 diabetes mellitus  Last Hemoglobin A1C 8.8 on 9/3  Home regimen: Home meds: levemir 45U BID, Novolog 15U TIDWM    Plan:   - Hold home meds   - Glucose goals while inpatient 140-180s  - Glucose checks q6   - Start insulin 14 units bid + 10 Us TID WM with LDSS >> Adjust as needed   - Diet: Diabetic/Renal   - She needs a follow up with Endocrinology, diabetic diet education         Hypercholesterolemia  Continue home statin       Essential hypertension  Hypertensive on arrival  Continue home amlodipine 10mg, coreg 12.5mg BID (has been taking 25mg daily), hydralazine 25mg daily      VTE Risk Mitigation (From admission, onward)         Ordered     IP VTE HIGH RISK PATIENT  Once     Anticipate procedure  10/13/20 1914     Place sequential compression device  Until discontinued      10/13/20 1914                 Mari Aponte MD  Internal Medicine Department, PGY-II  [facility]  [phone]

## 2020-10-14 NOTE — ASSESSMENT & PLAN NOTE
Hypertensive on arrival  Continue home amlodipine 10mg, coreg 12.5mg BID (has been taking 25mg daily), hydralazine 25mg daily

## 2020-10-14 NOTE — PT/OT/SLP PROGRESS
Occupational Therapy      Patient Name:  Kylie King   MRN:  [id number]    Patient not seen today secondary to patient being off the floor in dialysis. Will follow-up for next treatment session as scheduled.    POP Tripp  10/14/2020

## 2020-10-14 NOTE — ASSESSMENT & PLAN NOTE
Ms. King is a 63 yo F with T2DM, ESRD on HD (MWF), HFpEF (EF 50-55%), HTN, HLD, COPD, T2DM, chronic venous insufficiency, and anxiety/depression that presents for worsening R foot wound. She has multiple admission recently, for same complains, treated with course of Vanc and cefepime (stopped 9/24) she is s/p SFA stenting on 09/17/2020 for a completely occluded SFA.     Fever, worsen pain in the right toe, foul smell. likely gangrene of the right foot.   She was seen by podiatry on 10/1, wound debridement was done   Ddx include gangrene, osteomyelitis?     Plan:     - Will start broad spectrum Abx of vanc and cefepime   - Podiatry consulted appreciate recs, planning for amputation of the right toe  - Follow Blood Cx  - Npo after midnight for possible intervention

## 2020-10-14 NOTE — CONSULTS
[facility]  Podiatry  Consult Note    Patient Name: Kylie King  MRN: [id number]  Admission Date: 10/13/2020  Hospital Length of Stay: 1 days  Attending Physician: Gilles Hogue MD  Primary Care Provider: Virginia Foote MD     Inpatient consult to Podiatry  Consult performed by: Ryne Mcdonald MD  Consult ordered by: Annalise David PA-C        Subjective:     History of Present Illness:  Kylie King is a 65 y.o. female who  has a past medical history of Anxiety, Arthritis, Breast cancer, Carotid artery stenosis, Cataract, Choledocholithiasis, Chronic obstructive pulmonary disease, Chronic venous insufficiency, Colon cancer, CRI (chronic renal insufficiency), Dialysis AV fistula malfunction, ESRD (end stage renal disease) on dialysis, Former smoker, GERD (gastroesophageal reflux disease), History of acute pancreatitis, History of cerebrovascular accident, History of colon cancer, HTN (hypertension), benign, Hypercholesterolemia, Hyperlipidemia, Hypoxemia, Intracerebral hemorrhage, Kidney stone, Lymphedema of both lower extremities, Obesity, Pancreatitis, Pancreatitis, Physical deconditioning, Pulmonary edema, Sacroiliac joint pain, Spinal stenosis, Spinal stenosis, lumbar, Stroke, Tobacco abuse, and Type 2 diabetes mellitus with neurological manifestations, controlled.    Patient admitted for osteomyelitis and gangrene to the right forefoot.  Consulted to Podiatry for the same.  Patient had recent revascularization by vascular surgery.  She presented to podiatry clinic yesterday with worsening gangrene and worsening dorsal right 2nd toe.  She denies any fever, nausea, vomiting or chills.  But she does admit to pain tired and fatigued.  She admits to a fall about 1 week ago when she was trying to stand up.  She is otherwise ambulatory with a walker and is mostly neuropathy to feet.    Upon admission to the ED she was found to be afebrile vital signs stable.  No leukocytosis.  Her  CRP is greater than 100.  Ft x-rays concerning for impaction fracture or pathological fracture of the right proximal phalanx with fractures to the distal 2nd, 3rd, 4th metatarsals with no significant displacement.  MRI was concerning for osteomyelitis the hallux.      Scheduled Meds:   amLODIPine  10 mg Oral QHS    aspirin  81 mg Oral Daily    atorvastatin  40 mg Oral Daily    carvediloL  25 mg Oral BID    citalopram  20 mg Oral Nightly    fluticasone propionate  2 spray Each Nostril Daily    gabapentin  400 mg Oral QHS    hydrALAZINE  25 mg Oral BID    insulin aspart U-100  10 Units Subcutaneous TIDWM    insulin detemir U-100  14 Units Subcutaneous BID    letrozole  2.5 mg Oral Nightly     Continuous Infusions:  PRN Meds:acetaminophen, dextrose 50%, dextrose 50%, glucagon (human recombinant), glucose, glucose, insulin aspart U-100, sodium chloride 0.9%    Review of patient's allergies indicates:   Allergen Reactions    Cyclobenzaprine Hallucinations    Erythromycin      Stomach upset        Past Medical History:   Diagnosis Date    Anxiety     Arthritis     DDD, Lumbar    Breast cancer 1/2013    left breast- invasive mammary carcinoma, ER/MO positive, Her2 negative    Carotid artery stenosis 8/13/2018 6/22/2018: Carotid Duplex: SHANELL: Moderate plaquing - 2.0 m/s - <50%. LICA: Moderate plaquing - 1.6 m/s - <50%.    Cataract     Choledocholithiasis     s/p ERCP and stent placement    Chronic obstructive pulmonary disease 6/8/2018    Chronic venous insufficiency     Colon cancer 2001    CRI (chronic renal insufficiency)     one kidney    Dialysis AV fistula malfunction     ESRD (end stage renal disease) on dialysis     Former smoker 6/8/2018    GERD (gastroesophageal reflux disease)     History of acute pancreatitis 2009 2/09 s/p sphincterotomy    History of cerebrovascular accident 6/8/2018    History of colon cancer     s/p sigmoid colectomy    HTN (hypertension), benign      Hypercholesterolemia     Hyperlipidemia     Hypoxemia 6/8/2018    Intracerebral hemorrhage     Kidney stone     left    Lymphedema of both lower extremities     Obesity     Pancreatitis     Pancreatitis 2008    Physical deconditioning     Pulmonary edema     Sacroiliac joint pain     Spinal stenosis     Spinal stenosis, lumbar     Stroke 12/2012    Tobacco abuse     Type 2 diabetes mellitus with neurological manifestations, controlled     Neuropathy     Past Surgical History:   Procedure Laterality Date    ANGIOGRAPHY OF LOWER EXTREMITY Right 9/17/2020    Procedure: Angiogram Extremity Unilateral;  Surgeon: RICK Pollard III, MD;  Location: [facility] OR [address] [place] [address];  Service: Peripheral Vascular;  Laterality: Right;  6.2 minutes of fluro  690.88 mGy  112.64 Gycm2  55 ml    AV FISTULA PLACEMENT Right 5/28/2018    Procedure: CREATION -FISTULA-AV;  Surgeon: RICK Pollard III, MD;  Location: [facility] OR [address] [place] [address];  Service: Peripheral Vascular;  Laterality: Right;    BREAST BIOPSY  1/2012    left breast invasive mammary carcinoma (lateral bx) and intraductal papilloma (medial bx)    BREAST BIOPSY  1999    rt breast FA    CATARACT EXTRACTION W/  INTRAOCULAR LENS IMPLANT Right 1/24/2019        CATARACT EXTRACTION W/  INTRAOCULAR LENS IMPLANT Left 1/31/2019    Procedure: EXTRACTION, CATARACT, WITH IOL INSERTION;  Surgeon: Immanuel Moncada MD;  Location[facility];  Service: Ophthalmology;  Laterality: Left;    CHOLECYSTECTOMY  2001    COLON SURGERY      HERNIA REPAIR      left nephrectomy      atrophic kidney and hydronephrosis    left oopherectomy  2001    MASTECTOMY  2013    left    NEPHRECTOMY  2011    lap    REVISION OF ARTERIOVENOUS FISTULA Right 8/15/2018    Procedure: REVISION, AV FISTULA;  Surgeon: RICK Pollard III, MD;  Location: [address];  Service: Peripheral Vascular;  Laterality: Right;    SIGMOIDECTOMY  2001    TOTAL REDUCTION MAMMOPLASTY Right 2013       Family History      Problem Relation (Age of Onset)    Arthritis Mother    Breast cancer Maternal Grandmother    Cancer Maternal Grandmother, Maternal Aunt    Celiac disease Sister    Diabetes Father    Heart disease Mother, Father, Maternal Grandmother        Tobacco Use    Smoking status: Current Some Day Smoker     Packs/day: 0.50     Years: 28.00     Pack years: 14.00     Types: Cigarettes     Start date: 1990     Last attempt to quit: 2018     Years since quittin.7    Smokeless tobacco: Never Used    Tobacco comment: anxious   Substance and Sexual Activity    Alcohol use: No     Frequency: Never     Drinks per session: Patient refused     Binge frequency: Never    Drug use: No    Sexual activity: Never     Partners: Male     Review of Systems   Constitutional: Positive for fatigue. Negative for chills and fever.   Cardiovascular: Positive for leg swelling.   Gastrointestinal: Negative for nausea and vomiting.   Musculoskeletal: Positive for gait problem.   Skin: Positive for color change and wound.     Objective:     Vital Signs (Most Recent):  Temp: 97.4 °F (36.3 °C) (10/14/20 0909)  Pulse: 75 (10/14/20 0909)  Resp: 16 (10/14/20 0909)  BP: 135/65 (10/14/20 0909)  SpO2: (!) 90 % (10/14/20 0909) Vital Signs (24h Range):  Temp:  [97.3 °F (36.3 °C)-99.6 °F (37.6 °C)] 97.4 °F (36.3 °C)  Pulse:  [69-84] 75  Resp:  [16-20] 16  SpO2:  [90 %-99 %] 90 %  BP: (122-173)/(10-97) 135/65     Weight: 129.2 kg (284 lb 13.4 oz)  Body mass index is 48.89 kg/m².    Foot Exam    Right Foot/Ankle     Inspection and Palpation  Ecchymosis: none  Tenderness: none   Swelling: none   Skin Exam: drainage, cellulitis, abnormal color and ulcer;     Neurovascular  Dorsalis pedis: 1+  Posterior tibial: 1+  Saphenous nerve sensation: absent  Tibial nerve sensation: absent  Superficial peroneal nerve sensation: absent  Deep peroneal nerve sensation: absent  Sural nerve sensation: absent      Left Foot/Ankle      Inspection and  Palpation  Ecchymosis: none  Tenderness: none   Swelling: none     Neurovascular  Dorsalis pedis: 1+  Posterior tibial: 1+  Saphenous nerve sensation: diminished  Tibial nerve sensation: diminished  Superficial peroneal nerve sensation: diminished  Deep peroneal nerve sensation: diminished  Sural nerve sensation: diminished            Laboratory:  CBC:   Recent Labs   Lab 10/14/20  0249   WBC 6.37   RBC 3.71*   HGB 10.2*   HCT 34.0*      MCV 92   MCH 27.5   MCHC 30.0*     CMP:   Recent Labs   Lab 10/14/20  0249   *   CALCIUM 8.5*   ALBUMIN 2.9*   PROT 7.2   *   K 4.5   CO2 24   CL 86*   BUN 55*   CREATININE 7.7*   ALKPHOS 58   ALT 27   AST 19   BILITOT 0.6     CRP:   Recent Labs   Lab 10/13/20  1706   .6*     ESR:   Recent Labs   Lab 10/13/20  1706   SEDRATE 85*       Diagnostic Results:  I have reviewed all pertinent imaging results/findings within the past 24 hours.  Imaging Results          MRI Foot (Forefoot) Right Without Contrast (Final result)  Result time 10/13/20 23:30:43    Final result by Steven Addison MD (10/13/20 23:30:43)                 Impression:      Abnormal study involving the proximal and distal phalanx of the 1st digit.  This could be posttraumatic or secondary to infection/osteomyelitis.  Visualization is limited.    Small fractures of the distal 2nd, 3rd and 4th metatarsals with associated edema could be post-traumatic.  Mild edema in the mid 2nd metatarsal also could represent osseous contusion.  Osseous infection is not excluded.    Diffuse edema in the soft tissues of the forefoot could represent cellulitis.  Recommend clinical correlation.    Suboptimal image quality      Electronically signed by: Steven Addison  Date:    10/13/2020  Time:    23:30             Narrative:    EXAMINATION:  MRI FOOT (FOREFOOT) RIGHT WITHOUT CONTRAST    CLINICAL HISTORY:  Osteomyelitis suspected, diabetic;    TECHNIQUE:  Multiplanar MR imaging through the right forefoot.  Axial  sagittal coronal imaging.  T1, stir    COMPARISON:  None    FINDINGS:  There is diminished T1 and increased T2/stir signal within the proximal and distal phalanx of the 1st digit consistent with edema.  Abnormal signal within the mid and distal 2nd metatarsal, distal 3rd metatarsal, distal 4th metatarsal.    There are fractures noted in the distal 2nd, 3rd and 4th metatarsals.    There is soft tissue edema throughout the forefoot.    Image quality is suboptimal with motion artifact.    Edema in the 1st digit could be associated with infection/osteomyelitis.  Recommend clinical correlation and follow-up.    No abscess is identified.                               X-Ray Foot Complete Right (Final result)  Result time 10/13/20 16:47:18    Final result by Lisandro Camacho MD (10/13/20 16:47:18)                 Impression:      1. Fractures as described above, somewhat limited secondary to overlying bandaging material and osteopenia.  Please note, given the appearance of the distal aspect of the proximal phalanx of the great toe, superimposed infection is not excluded and correlation is needed, particularly given the large degree of edema.  Correlation is advised.  Consider follow-up radiography to exclude additional fracture.      Electronically signed by: Lisandro Camacho MD  Date:    10/13/2020  Time:    16:47             Narrative:    EXAMINATION:  XR FOOT COMPLETE 3 VIEW RIGHT    CLINICAL HISTORY:  . Other injury of unspecified body region, initial encounter    TECHNIQUE:  AP, lateral, and oblique views of the right foot were performed.    COMPARISON:  09/03/2020    FINDINGS:  Three views right foot.    There is diffuse osteopenia.  There is impacted fracture involving the distal aspect of the proximal phalanx of the great toe, fracture planes appear to involve the physeal surface.  There is questionable foreshortening of the proximal phalanx of the 2nd toe, concerning for additional fracture although evaluation is  limited.  There is fracture involving the distal aspects of the 2nd, 3rd, and 4th metatarsals.  There is remote fracture involving the 5th metatarsal.  There is diffuse edema about the foot.  No dislocation.  There is edema about the dorsal aspect of the foot.  Degenerative changes are noted of the calcaneus.  There is vascular calcification.                                Clinical Findings:  Right foot:  Patient has gangrenous changes to the distal tuft of the right hallux.  There is drainage and periwound erythema with tissue loss extending proximally.  Also has a small wound at the dorsal aspect of the right 2nd toe that has periwound erythema and edema.  There is exposed bone on the 2nd toe wound.    10/14/2020                  Assessment/Plan:     * Right toe wound infection   Above    Gangrene of toe of right foot  Plan:  -plan for OR amputation tomorrow.  -patient to be NPO at midnight  -patient can weight bear to heel in surgical shoe  -can hold antibiotics for now to increase OR culture yield.  Will initiate antibiotics after specimens were taken.  -podiatry will follow    Diabetic peripheral neuropathy associated with type 2 diabetes mellitus  Managed by primary        Thank you for your consult. I will follow-up with patient. Please contact us if you have any additional questions.    Ryne Mcdonald MD  Podiatry  [facility]

## 2020-10-14 NOTE — HPI
Kylie King is a 65 y.o. female who  has a past medical history of Anxiety, Arthritis, Breast cancer, Carotid artery stenosis, Cataract, Choledocholithiasis, Chronic obstructive pulmonary disease, Chronic venous insufficiency, Colon cancer, CRI (chronic renal insufficiency), Dialysis AV fistula malfunction, ESRD (end stage renal disease) on dialysis, Former smoker, GERD (gastroesophageal reflux disease), History of acute pancreatitis, History of cerebrovascular accident, History of colon cancer, HTN (hypertension), benign, Hypercholesterolemia, Hyperlipidemia, Hypoxemia, Intracerebral hemorrhage, Kidney stone, Lymphedema of both lower extremities, Obesity, Pancreatitis, Pancreatitis, Physical deconditioning, Pulmonary edema, Sacroiliac joint pain, Spinal stenosis, Spinal stenosis, lumbar, Stroke, Tobacco abuse, and Type 2 diabetes mellitus with neurological manifestations, controlled.    Patient admitted for osteomyelitis and gangrene to the right forefoot.  Consulted to Podiatry for the same.  Patient had recent revascularization by vascular surgery.  She presented to podiatry clinic yesterday with worsening gangrene and worsening dorsal right 2nd toe.  She denies any fever, nausea, vomiting or chills.  But she does admit to pain tired and fatigued.  She admits to a fall about 1 week ago when she was trying to stand up.  She is otherwise ambulatory with a walker and is mostly neuropathy to feet.    Upon admission to the ED she was found to be afebrile vital signs stable.  No leukocytosis.  Her CRP is greater than 100.  Ft x-rays concerning for impaction fracture or pathological fracture of the right proximal phalanx with fractures to the distal 2nd, 3rd, 4th metatarsals with no significant displacement.  MRI was concerning for osteomyelitis the hallux.

## 2020-10-14 NOTE — ANESTHESIA PREPROCEDURE EVALUATION
[facility]  Anesthesia Pre-Operative Evaluation         Patient Name: Kylie King  YOB: 1955  MRN: [id number]    SUBJECTIVE:     Pre-operative evaluation for Procedure(s) (LRB):  AMPUTATION, TOE (Right)     10/14/2020    Kylie King is a 65 y.o. female w/ a significant PMHx of HFpEF, HTN, HLD, COPD, DM2, and ESRD on iHD MWF who presented to the ED for R foot pain.  She has had multiple admissions for similar complaints with wound on R great toe s/p SFA stenting on 9/16 and also treated with Vanc/Cefepime that was discontinued on 9/24.  She has had worsening pain over hte past several days along with foul smelling drainage from her wound with fever of 101.8. MRI c/f osteo. Now presents for the above procedure.    Echo: TTE 4/20/2018    1 - Concentric hypertrophy.     2 - Low normal to mildly depressed left ventricular systolic function (EF 50-55%).     3 - Left ventricular diastolic dysfunction.     4 - Normal right ventricular systolic function .     5 - Technically difficult study.     LDA: None documented.    Prev airway: Present Prior to Hospital Arrival?: No; Placement Date: 11/25/16; Placement Time: 1510; Method of Intubation: Direct laryngoscopy; Inserted by: CRNA; Airway Device: Endotracheal Tube; Mask Ventilation: Easy - oral; Intubated: Postinduction; Blade: Asencio #2; Airway Device Size: 7.0; Style: Cuffed; Cuff Inflation: Minimal occlusive pressure; Inflation Amount: 8; Placement Verified By: Auscultation, Capnometry; Grade: Grade I; Complicating Factors: Anterior larynx, Small mouth, Obesity, Short neck; Intubation Findings: Positive EtCO2, Bilateral breath sounds, Atraumatic/Condition of teeth unchanged;  Depth of Insertion: 20; Securment: Teeth; Complications: None; Breath Sounds: Equal Bilateral; Insertion Attempts: 1; Removal Date: 11/25/16;  Removal Time: 1607     Drips: None documented.    Patient Active Problem List   Diagnosis    History of  intracranial hemorrhage    Essential hypertension    Hypercholesterolemia    Type 2 diabetes mellitus    Diabetic peripheral neuropathy associated with type 2 diabetes mellitus    S/P mastectomy    History of breast cancer    Seroma    Lumbar stenosis    DDD (degenerative disc disease), lumbar    Sacroiliac joint pain    Physical deconditioning    Facet syndrome    Lumbar facet arthropathy    S/P amputation of lesser toe    Incontinence of urine in female    Mixed urge and stress incontinence    Nocturia    Morbid obesity    Debility    Decreased range of motion (ROM) of shoulder    Decreased range of motion (ROM) of knee    Diabetic ulcer of right heel associated with type 2 diabetes mellitus, with fat layer exposed    Chronic venous insufficiency    Lymphedema of both lower extremities    Open wound of right heel    Lymphedema    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    Hypoxia    Pulmonary edema    Hypertensive emergency    Heart failure, diastolic, chronic    Shortness of breath    Preop examination    ESRD (end stage renal disease) on dialysis    History of cerebrovascular accident    History of colon cancer    Former smoker    Chronic obstructive pulmonary disease    Hypoxemia    Carotid artery stenosis    Nuclear sclerosis, bilateral    Postoperative eye state    Primary osteoarthritis involving multiple joints    Muscular imbalance    Frequent falls    Fall (on) (from) other stairs and steps, initial encounter    Secondary renovascular hypertension, malignant    Hyperkalemia    Anemia in ESRD (end-stage renal disease)    Occasional tremors    Depression    Impaired functional mobility and endurance    Upper respiratory infection, acute    Tobacco use    Hammer toe of left foot    Gangrene of toe of right foot    Toe gangrene    Atherosclerotic peripheral vascular disease with gangrene    Right toe wound infection     Normocytic anemia     Hyperphosphatemia    Hyponatremia       Review of patient's allergies indicates:   Allergen Reactions    Cyclobenzaprine Hallucinations    Erythromycin      Stomach upset       Current Inpatient Medications:   amLODIPine  10 mg Oral QHS    aspirin  81 mg Oral Daily    atorvastatin  40 mg Oral Daily    carvediloL  25 mg Oral BID    citalopram  20 mg Oral Nightly    fluticasone propionate  2 spray Each Nostril Daily    gabapentin  400 mg Oral QHS    hydrALAZINE  25 mg Oral BID    insulin aspart U-100  10 Units Subcutaneous TIDWM    insulin detemir U-100  14 Units Subcutaneous BID    letrozole  2.5 mg Oral Nightly       Current Facility-Administered Medications on File Prior to Encounter   Medication Dose Route Frequency Provider Last Rate Last Dose    lidocaine (PF) 10 mg/ml (1%) injection 10 mg  1 mL Intradermal Once Jacob Pacheco MD        mupirocin 2 % ointment   Nasal On Call Procedure Casey Owens MD        mupirocin 2 % ointment   Nasal On Call Procedure Jacob Pacheco MD         Current Outpatient Medications on File Prior to Encounter   Medication Sig Dispense Refill    acetaminophen (TYLENOL) 325 MG tablet Take 650 mg by mouth every 6 (six) hours as needed for mild pain 1-3/10 pain scale.      acetaminophen-codeine 300-30mg (TYLENOL #3) 300-30 mg Tab Take 1 tablet by mouth every 6 (six) hours as needed. 90 tablet 0    albuterol (PROVENTIL/VENTOLIN HFA) 90 mcg/actuation inhaler Inhale 2 puffs into the lungs every 6 (six) hours as needed for Wheezing. Rescue 18 g 3    amLODIPine (NORVASC) 10 MG tablet Take 1 tablet (10 mg total) by mouth every morning. (Patient taking differently: Take 10 mg by mouth every evening. ) 90 tablet 3    ammonium lactate 12 % Crea Apply twice daily to affected parts both feet as needed. 140 g 11    aspirin (ECOTRIN) 81 MG EC tablet Take 1 tablet (81 mg total) by mouth once daily. 30 tablet 11    atorvastatin (LIPITOR) 40 MG tablet Take 1 tablet (40  "mg total) by mouth once daily. 90 tablet 3    BD INSULIN PEN NEEDLE UF MINI 31 gauge x 3/16" Ndle USE 5 NEEDLES PER  each 2    carvediloL (COREG) 25 MG tablet Take 1 tablet (25 mg total) by mouth 2 (two) times daily. 180 tablet 3    citalopram (CELEXA) 20 MG tablet Take 1 tablet (20 mg total) by mouth nightly. 90 tablet 3    clopidogreL (PLAVIX) 75 mg tablet Take 1 tablet (75 mg total) by mouth once daily. 30 tablet 11    diclofenac sodium (VOLTAREN) 1 % Gel Apply 2 grams topically 4 (four) times daily. 100 g 3    diphth,pertus,acell,,tetanus (BOOSTRIX TDAP) 2.5-8-5 Lf-mcg-Lf/0.5mL Syrg injection Inject 0.5 mLs into the muscle once. For one dose. for 1 dose 0.5 mL 0    famotidine (PEPCID) 20 MG tablet Take 1 tablet (20 mg total) by mouth nightly as needed. 90 tablet 3    fish oil-omega-3 fatty acids 300-1,000 mg capsule Take 1 capsule by mouth every morning.      fluticasone (FLONASE) 50 mcg/actuation nasal spray 2 sprays (100 mcg total) by Each Nare route once daily. 1 Bottle 2    gabapentin (NEURONTIN) 400 MG capsule Take 1 capsule (400 mg total) by mouth every evening. 90 capsule 3    honey (MEDIHONEY, HONEY,) 80 % Gel Apply 1 application topically 3 (three) times daily. 44 mL 3    hydrALAZINE (APRESOLINE) 25 MG tablet Take 1 tablet (25 mg total) by mouth 2 (two) times daily. 180 tablet 3    insulin detemir U-100 (LEVEMIR) 100 unit/mL injection Inject 45 Units into the skin 2 (two) times daily.      insulin lispro (HUMALOG KWIKPEN INSULIN) 100 unit/mL pen BLOOD GLUCOSE 150-200, INJECT 1 UNITS UNDER THE SKIN, 201-250, 2 UNITS, 251-300, 3 UNITS THREE TIMES A DAY AS DIRECTED 1 Box 3    insulin lispro (HUMALOG KWIKPEN INSULIN) 100 unit/mL pen BLOOD GLUCOSE 150-200, INJECT 15 UNITS UNDER THE SKIN, 201-250, 18 UNITS, 251-300, 20 UNITS THREE TIMES A DAY AS DIRECTED 45 mL 3    insulin lispro (HUMALOG KWIKPEN INSULIN) 100 unit/mL pen BLOOD GLUCOSE 150-200, INJECT 1 UNITS UNDER THE SKIN, 201-250, 2 " "UNITS, 251-300, 3 UNITS THREE TIMES A DAY AS DIRECTED      insulin needles, disposable, (PEN NEEDLE, DIABETIC) 31 Ndle Patient needs 5 needles a day 500 each 3    iron polysacch complex-b12-fa 150-25-1 mg-mcg-mg (FERREX 150 FORTE) 150-25-1 mg-mcg-mg Cap Take 150 mg by mouth once daily.      ketoconazole (NIZORAL) 2 % cream Apply topically once daily. 1 Tube 2    lancets (ONETOUCH DELICA LANCETS) 33 gauge Misc 1 lancet by Misc.(Non-Drug; Combo Route) route 4 (four) times daily before meals and nightly. 400 each 4    letrozole (FEMARA) 2.5 mg Tab Take 1 tablet (2.5 mg total) by mouth nightly. 90 tablet 3    pen needle, diabetic 32 gauge x 5/32" Ndle 1 Device by Misc.(Non-Drug; Combo Route) route 5 (five) times daily. 450 each 6       Past Surgical History:   Procedure Laterality Date    ANGIOGRAPHY OF LOWER EXTREMITY Right 9/17/2020    Procedure: Angiogram Extremity Unilateral;  Surgeon: RICK Pollard III, MD;  Location: [address];  Service: Peripheral Vascular;  Laterality: Right;  6.2 minutes of fluro  690.88 mGy  112.64 Gycm2  55 ml    AV FISTULA PLACEMENT Right 5/28/2018    Procedure: CREATION -FISTULA-AV;  Surgeon: RICK Pollard III, MD;  Location: [address];  Service: Peripheral Vascular;  Laterality: Right;    BREAST BIOPSY  1/2012    left breast invasive mammary carcinoma (lateral bx) and intraductal papilloma (medial bx)    BREAST BIOPSY  1999    rt breast FA    CATARACT EXTRACTION W/  INTRAOCULAR LENS IMPLANT Right 1/24/2019        CATARACT EXTRACTION W/  INTRAOCULAR LENS IMPLANT Left 1/31/2019    Procedure: EXTRACTION, CATARACT, WITH IOL INSERTION;  Surgeon: Immanuel Moncada MD;  Location: [facility];  Service: Ophthalmology;  Laterality: Left;    CHOLECYSTECTOMY  2001    COLON SURGERY      HERNIA REPAIR      left nephrectomy      atrophic kidney and hydronephrosis    left oopherectomy  2001    MASTECTOMY  2013    left    NEPHRECTOMY  2011    lap    REVISION OF " ARTERIOVENOUS FISTULA Right 8/15/2018    Procedure: REVISION, AV FISTULA;  Surgeon: RICK Pollard III, MD;  [facility];  Service: Peripheral Vascular;  Laterality: Right;    SIGMOIDECTOMY      TOTAL REDUCTION MAMMOPLASTY Right 2013       Social History     Socioeconomic History    Marital status: Single     Spouse name: Not on file    Number of children: 2    Years of education: Not on file    Highest education level: Not on file   Occupational History    Occupation: not working     Employer: [email or web address]   Social Needs    Financial resource strain: Somewhat hard    Food insecurity     Worry: Never true     Inability: Never true    Transportation needs     Medical: Yes     Non-medical: Yes   Tobacco Use    Smoking status: Current Some Day Smoker     Packs/day: 0.50     Years: 28.00     Pack years: 14.00     Types: Cigarettes     Start date: 1990     Last attempt to quit: 2018     Years since quittin.7    Smokeless tobacco: Never Used    Tobacco comment: anxious   Substance and Sexual Activity    Alcohol use: No     Frequency: Never     Drinks per session: Patient refused     Binge frequency: Never    Drug use: No    Sexual activity: Never     Partners: Male   Lifestyle    Physical activity     Days per week: 0 days     Minutes per session: Not on file    Stress: Only a little   Relationships    Social connections     Talks on phone: More than three times a week     Gets together: Once a week     Attends Worship service: Not on file     Active member of club or organization: No     Attends meetings of clubs or organizations: Never     Relationship status:    Other Topics Concern    Not on file   Social History Narrative    She is not exercising regularly       OBJECTIVE:     Vital Signs Range (Last 24H):  Temp:  [36.3 °C (97.3 °F)-37.6 °C (99.6 °F)]   Pulse:  [69-84]   Resp:  [16-20]   BP: (130-173)/(10-97)   SpO2:  [90 %-99 %]       CBC:   Recent Labs      10/13/20  1706 10/14/20  0249   WBC 5.93 6.37   RBC 3.64* 3.71*   HGB 10.2* 10.2*   HCT 33.8* 34.0*    249   MCV 93 92   MCH 28.0 27.5   MCHC 30.2* 30.0*       CMP:   Recent Labs     10/13/20  1706 10/14/20  0249   * 130*   K 4.5 4.5   CL 86* 86*   CO2 24 24   BUN 47* 55*   CREATININE 7.2* 7.7*   * 286*   MG  --  1.9   PHOS  --  7.1*   CALCIUM 8.8 8.5*   ALBUMIN 3.0* 2.9*   PROT 7.5 7.2   ALKPHOS 63 58   ALT 28 27   AST 22 19   BILITOT 0.5 0.6       INR:  Recent Labs     10/14/20  0249   INR 1.0       Diagnostic Studies: No relevant studies.    EKG: No recent studies available.    2D ECHO:  Results for orders placed or performed during the hospital encounter of 04/19/18   2D echo with color flow doppler   Result Value Ref Range    QEF 51 55 - 65    Diastolic Dysfunction No          ASSESSMENT/PLAN:         Pre-op Assessment    I have reviewed the Patient Summary Reports.     I have reviewed the Nursing Notes.    I have reviewed the Medications.     Review of Systems  Anesthesia Hx:  No problems with previous Anesthesia  History of prior surgery of interest to airway management or planning: Denies Family Hx of Anesthesia complications.   Denies Personal Hx of Anesthesia complications.   Social:  Non-Smoker    Cardiovascular:   Exercise tolerance: poor Hypertension Denies CAD.     Denies Angina. DAVIS  Functional Capacity Can you climb two flights of stairs? ==> Yes    Pulmonary:   Denies Asthma.  Denies Recent URI.  Denies Sleep Apnea.    Renal/:   Chronic Renal Disease, Dialysis, ESRD    Hepatic/GI:   Denies PUD. Denies Hiatal Hernia.  Denies GERD. Denies Liver Disease.  Denies Hepatitis.    Neurological:   CVA, no residual symptoms Denies Seizures.    Endocrine:   Diabetes Denies Hypothyroidism.        Physical Exam  General:  Well nourished    Airway/Jaw/Neck:  Airway Findings: Mouth Opening: Normal Tongue: Normal  General Airway Assessment: Adult  Mallampati: I  TM Distance: Normal, at  least 6 cm  Jaw/Neck Findings:  Neck ROM: Normal ROM      Dental:  Dental Findings: Upper front caps             Anesthesia Plan  Type of Anesthesia, risks & benefits discussed:  Anesthesia Type:  general, MAC  Patient's Preference: Proceed with anesthesia understanding that the risks are very small but could be serious or life threatening.  Intra-op Monitoring Plan: standard ASA monitors  Intra-op Monitoring Plan Comments:   Post Op Pain Control Plan: per primary service following discharge from PACU, IV/PO Opioids PRN and multimodal analgesia  Post Op Pain Control Plan Comments:   Induction:   IV  Beta Blocker:  Patient is not currently on a Beta-Blocker (No further documentation required).       Informed Consent: Patient understands risks and agrees with Anesthesia plan.  Questions answered. Anesthesia consent signed with patient.  ASA Score: 3     Day of Surgery Review of History & Physical: I have interviewed and examined the patient. I have reviewed the patient's H&P dated:            Ready For Surgery From Anesthesia Perspective.

## 2020-10-14 NOTE — HPI
Kylie King is a 64 yo female with HFpEF, HTN, HLD, COPD, DM2, and ESRD on iHD MWF who presented to the ED for R foot pain.  She has had multiple admissions for similar complaints with wound on R great toe s/p SFA stenting on 9/16 and also treated with Vanc/Cefepime that was discontinued on 9/24.  She has had worsening pain over hte past several days along with foul smelling drainage from her wound with fever of 101.8.  She saw Podiatry yesterday and they recommend for her to go the ER for further evaluation.  MRI was obtained, concerning for osteo and she was admitted to hospital medicine with consults to Podiatry and Nephrology for her ESRD management.

## 2020-10-14 NOTE — PT/OT/SLP PROGRESS
Physical Therapy      Patient Name:  Kylie King   MRN:  [id number]    Patient not seen today secondary to Dialysis. Will follow-up as appropriate.    Bernardo Ortega, PT

## 2020-10-14 NOTE — PROGRESS NOTES
Pharmacist Renal Dose Adjustment Note    Kylie King is a 65 y.o. female being treated with the medication cefepime    Patient Data:    Vital Signs (Most Recent):  Temp: 99.6 °F (37.6 °C) (10/13/20 1426)  Pulse: 76 (10/13/20 1748)  Resp: 18 (10/13/20 1748)  BP: (!) 141/78 (10/13/20 1748)  SpO2: 95 % (10/13/20 1748)   Vital Signs (72h Range):  Temp:  [97.7 °F (36.5 °C)-99.6 °F (37.6 °C)]   Pulse:  [76-84]   Resp:  [18]   BP: (122-173)/(10-78)   SpO2:  [95 %]      Recent Labs   Lab 10/13/20  1706   CREATININE 7.2*     Serum creatinine: 7.2 mg/dL (H) 10/13/20 1706  Estimated creatinine clearance: 10 mL/min (A)    Cefepime 2 g IV q24h will be changed to cefepime 1 g IV q24h due to HD    Pharmacist's Name: Lyle Mccracken, PharmD, BCPS  Pharmacist's Extension: [phone]

## 2020-10-14 NOTE — SUBJECTIVE & OBJECTIVE
Past Medical History:   Diagnosis Date    Anxiety     Arthritis     DDD, Lumbar    Breast cancer 1/2013    left breast- invasive mammary carcinoma, ER/AZ positive, Her2 negative    Carotid artery stenosis 8/13/2018 6/22/2018: Carotid Duplex: SHANELL: Moderate plaquing - 2.0 m/s - <50%. LICA: Moderate plaquing - 1.6 m/s - <50%.    Cataract     Choledocholithiasis     s/p ERCP and stent placement    Chronic obstructive pulmonary disease 6/8/2018    Chronic venous insufficiency     Colon cancer 2001    CRI (chronic renal insufficiency)     one kidney    Dialysis AV fistula malfunction     ESRD (end stage renal disease) on dialysis     Former smoker 6/8/2018    GERD (gastroesophageal reflux disease)     History of acute pancreatitis 2009 2/09 s/p sphincterotomy    History of cerebrovascular accident 6/8/2018    History of colon cancer     s/p sigmoid colectomy    HTN (hypertension), benign     Hypercholesterolemia     Hyperlipidemia     Hypoxemia 6/8/2018    Intracerebral hemorrhage     Kidney stone     left    Lymphedema of both lower extremities     Obesity     Pancreatitis     Pancreatitis 2008    Physical deconditioning     Pulmonary edema     Sacroiliac joint pain     Spinal stenosis     Spinal stenosis, lumbar     Stroke 12/2012    Tobacco abuse     Type 2 diabetes mellitus with neurological manifestations, controlled     Neuropathy       Past Surgical History:   Procedure Laterality Date    ANGIOGRAPHY OF LOWER EXTREMITY Right 9/17/2020    Procedure: Angiogram Extremity Unilateral;  Surgeon: RICK Pollard III, MD;  Location: [facility] OR [address] [place] [address];  Service: Peripheral Vascular;  Laterality: Right;  6.2 minutes of fluro  690.88 mGy  112.64 Gycm2  55 ml    AV FISTULA PLACEMENT Right 5/28/2018    Procedure: CREATION -FISTULA-AV;  Surgeon: RICK Pollard III, MD;  Location: [facility] OR [address] [place] [address];  Service: Peripheral Vascular;  Laterality: Right;    BREAST BIOPSY  1/2012    left breast  invasive mammary carcinoma (lateral bx) and intraductal papilloma (medial bx)    BREAST BIOPSY  1999    rt breast FA    CATARACT EXTRACTION W/  INTRAOCULAR LENS IMPLANT Right 1/24/2019        CATARACT EXTRACTION W/  INTRAOCULAR LENS IMPLANT Left 1/31/2019    Procedure: EXTRACTION, CATARACT, WITH IOL INSERTION;  Surgeon: Immanuel Moncada MD;  Location: [facility];  Service: Ophthalmology;  Laterality: Left;    CHOLECYSTECTOMY  2001    COLON SURGERY      HERNIA REPAIR      left nephrectomy      atrophic kidney and hydronephrosis    left oopherectomy  2001    MASTECTOMY  2013    left    NEPHRECTOMY  2011    lap    REVISION OF ARTERIOVENOUS FISTULA Right 8/15/2018    Procedure: REVISION, AV FISTULA;  Surgeon: RICK Pollard III, MD;  Location: [address];  Service: Peripheral Vascular;  Laterality: Right;    SIGMOIDECTOMY  2001    TOTAL REDUCTION MAMMOPLASTY Right 2013       Review of patient's allergies indicates:   Allergen Reactions    Cyclobenzaprine Hallucinations    Erythromycin      Stomach upset     Current Facility-Administered Medications   Medication Frequency    acetaminophen tablet 650 mg Q6H PRN    amLODIPine tablet 10 mg QHS    aspirin EC tablet 81 mg Daily    atorvastatin tablet 40 mg Daily    carvediloL tablet 25 mg BID    citalopram tablet 20 mg Nightly    dextrose 50% injection 12.5 g PRN    dextrose 50% injection 25 g PRN    fluticasone propionate 50 mcg/actuation nasal spray 100 mcg Daily    gabapentin capsule 400 mg QHS    glucagon (human recombinant) injection 1 mg PRN    glucose chewable tablet 16 g PRN    glucose chewable tablet 24 g PRN    hydrALAZINE tablet 25 mg BID    insulin aspart U-100 pen 0-5 Units QID (AC + HS) PRN    insulin aspart U-100 pen 10 Units TIDWM    insulin detemir U-100 pen 14 Units BID    letrozole tablet 2.5 mg Nightly    sodium chloride 0.9% flush 10 mL PRN     Facility-Administered Medications Ordered in Other Encounters    Medication Frequency    lidocaine (PF) 10 mg/ml (1%) injection 10 mg Once    mupirocin 2 % ointment On Call Procedure    mupirocin 2 % ointment On Call Procedure     Family History     Problem Relation (Age of Onset)    Arthritis Mother    Breast cancer Maternal Grandmother    Cancer Maternal Grandmother, Maternal Aunt    Celiac disease Sister    Diabetes Father    Heart disease Mother, Father, Maternal Grandmother        Tobacco Use    Smoking status: Current Some Day Smoker     Packs/day: 0.50     Years: 28.00     Pack years: 14.00     Types: Cigarettes     Start date: 1990     Last attempt to quit: 2018     Years since quittin.7    Smokeless tobacco: Never Used    Tobacco comment: anxious   Substance and Sexual Activity    Alcohol use: No     Frequency: Never     Drinks per session: Patient refused     Binge frequency: Never    Drug use: No    Sexual activity: Never     Partners: Male     Review of Systems   Constitutional: Positive for fatigue and fever. Negative for activity change, appetite change and chills.   HENT: Positive for congestion and sinus pain. Negative for facial swelling, postnasal drip and sinus pressure.    Respiratory: Positive for shortness of breath. Negative for cough and chest tightness.    Cardiovascular: Negative for chest pain, palpitations and leg swelling.   Gastrointestinal: Negative for abdominal distention, constipation, diarrhea, nausea and rectal pain.   Musculoskeletal: Positive for arthralgias. Negative for myalgias.   Skin: Positive for color change and wound.   Neurological: Negative for dizziness, light-headedness and headaches.   Psychiatric/Behavioral: Negative for agitation, behavioral problems and confusion.     Objective:     Vital Signs (Most Recent):  Temp: 98.6 °F (37 °C) (10/14/20 0930)  Pulse: 72 (10/14/20 1030)  Resp: 20 (10/14/20 0930)  BP: (!) 161/88 (10/14/20 1030)  SpO2: (!) 90 % (10/14/20 0909)  O2 Device (Oxygen Therapy): room air  (10/14/20 0930) Vital Signs (24h Range):  Temp:  [97.3 °F (36.3 °C)-99.6 °F (37.6 °C)] 98.6 °F (37 °C)  Pulse:  [69-84] 72  Resp:  [16-20] 20  SpO2:  [90 %-99 %] 90 %  BP: (130-173)/(10-97) 161/88     Weight: 129.2 kg (284 lb 13.4 oz) (10/14/20 0023)  Body mass index is 48.89 kg/m².  Body surface area is 2.42 meters squared.    No intake/output data recorded.    Physical Exam  Constitutional:       General: She is not in acute distress.     Appearance: She is obese. She is not ill-appearing.   HENT:      Head: Normocephalic and atraumatic.      Nose: No congestion or rhinorrhea.      Mouth/Throat:      Mouth: Mucous membranes are moist.      Pharynx: Oropharynx is clear. No oropharyngeal exudate or posterior oropharyngeal erythema.   Eyes:      General: No scleral icterus.        Right eye: No discharge.         Left eye: No discharge.      Extraocular Movements: Extraocular movements intact.      Conjunctiva/sclera: Conjunctivae normal.   Neck:      Musculoskeletal: Normal range of motion and neck supple.   Cardiovascular:      Rate and Rhythm: Normal rate and regular rhythm.      Heart sounds: No murmur. No friction rub. No gallop.    Pulmonary:      Effort: Pulmonary effort is normal. No respiratory distress.      Breath sounds: No wheezing or rales.   Abdominal:      General: There is no distension.      Palpations: Abdomen is soft.      Tenderness: There is no abdominal tenderness.   Musculoskeletal:      Right lower leg: No edema.      Left lower leg: No edema.   Skin:     General: Skin is warm.      Comments: RFA AVF   Neurological:      General: No focal deficit present.      Mental Status: She is alert and oriented to person, place, and time.   Psychiatric:         Mood and Affect: Mood normal.         Behavior: Behavior normal.         Significant Labs:  CBC:   Recent Labs   Lab 10/14/20  0249   WBC 6.37   RBC 3.71*   HGB 10.2*   HCT 34.0*      MCV 92   MCH 27.5   MCHC 30.0*     CMP:   Recent Labs    Lab 10/14/20  0249   *   CALCIUM 8.5*   ALBUMIN 2.9*   PROT 7.2   *   K 4.5   CO2 24   CL 86*   BUN 55*   CREATININE 7.7*   ALKPHOS 58   ALT 27   AST 19   BILITOT 0.6

## 2020-10-14 NOTE — SUBJECTIVE & OBJECTIVE
Past Medical History:   Diagnosis Date    Anxiety     Arthritis     DDD, Lumbar    Breast cancer 1/2013    left breast- invasive mammary carcinoma, ER/ME positive, Her2 negative    Carotid artery stenosis 8/13/2018 6/22/2018: Carotid Duplex: SHANELL: Moderate plaquing - 2.0 m/s - <50%. LICA: Moderate plaquing - 1.6 m/s - <50%.    Cataract     Choledocholithiasis     s/p ERCP and stent placement    Chronic obstructive pulmonary disease 6/8/2018    Chronic venous insufficiency     Colon cancer 2001    CRI (chronic renal insufficiency)     one kidney    Dialysis AV fistula malfunction     ESRD (end stage renal disease) on dialysis     Former smoker 6/8/2018    GERD (gastroesophageal reflux disease)     History of acute pancreatitis 2009 2/09 s/p sphincterotomy    History of cerebrovascular accident 6/8/2018    History of colon cancer     s/p sigmoid colectomy    HTN (hypertension), benign     Hypercholesterolemia     Hyperlipidemia     Hypoxemia 6/8/2018    Intracerebral hemorrhage     Kidney stone     left    Lymphedema of both lower extremities     Obesity     Pancreatitis     Pancreatitis 2008    Physical deconditioning     Pulmonary edema     Sacroiliac joint pain     Spinal stenosis     Spinal stenosis, lumbar     Stroke 12/2012    Tobacco abuse     Type 2 diabetes mellitus with neurological manifestations, controlled     Neuropathy       Past Surgical History:   Procedure Laterality Date    ANGIOGRAPHY OF LOWER EXTREMITY Right 9/17/2020    Procedure: Angiogram Extremity Unilateral;  Surgeon: RICK Pollard III, MD;  Location: [facility] OR [address] [place] [address];  Service: Peripheral Vascular;  Laterality: Right;  6.2 minutes of fluro  690.88 mGy  112.64 Gycm2  55 ml    AV FISTULA PLACEMENT Right 5/28/2018    Procedure: CREATION -FISTULA-AV;  Surgeon: RICK Pollard III, MD;  Location: [facility] OR [address] [place] [address];  Service: Peripheral Vascular;  Laterality: Right;    BREAST BIOPSY  1/2012    left breast  invasive mammary carcinoma (lateral bx) and intraductal papilloma (medial bx)    BREAST BIOPSY  1999    rt breast FA    CATARACT EXTRACTION W/  INTRAOCULAR LENS IMPLANT Right 1/24/2019        CATARACT EXTRACTION W/  INTRAOCULAR LENS IMPLANT Left 1/31/2019    Procedure: EXTRACTION, CATARACT, WITH IOL INSERTION;  Surgeon: Immanuel Moncada MD;  Location: [facility];  Service: Ophthalmology;  Laterality: Left;    CHOLECYSTECTOMY  2001    COLON SURGERY      HERNIA REPAIR      left nephrectomy      atrophic kidney and hydronephrosis    left oopherectomy  2001    MASTECTOMY  2013    left    NEPHRECTOMY  2011    lap    REVISION OF ARTERIOVENOUS FISTULA Right 8/15/2018    Procedure: REVISION, AV FISTULA;  Surgeon: RICK Pollard III, MD;  Location: [address];  Service: Peripheral Vascular;  Laterality: Right;    SIGMOIDECTOMY  2001    TOTAL REDUCTION MAMMOPLASTY Right 2013       Review of patient's allergies indicates:   Allergen Reactions    Cyclobenzaprine Hallucinations    Erythromycin      Stomach upset       Current Facility-Administered Medications on File Prior to Encounter   Medication    lidocaine (PF) 10 mg/ml (1%) injection 10 mg    mupirocin 2 % ointment    mupirocin 2 % ointment    [DISCONTINUED] sodium chloride 0.9% flush 10 mL     Current Outpatient Medications on File Prior to Encounter   Medication Sig    acetaminophen (TYLENOL) 325 MG tablet Take 650 mg by mouth every 6 (six) hours as needed for mild pain 1-3/10 pain scale.    acetaminophen-codeine 300-30mg (TYLENOL #3) 300-30 mg Tab Take 1 tablet by mouth every 6 (six) hours as needed.    albuterol (PROVENTIL/VENTOLIN HFA) 90 mcg/actuation inhaler Inhale 2 puffs into the lungs every 6 (six) hours as needed for Wheezing. Rescue    amLODIPine (NORVASC) 10 MG tablet Take 1 tablet (10 mg total) by mouth every morning. (Patient taking differently: Take 10 mg by mouth every evening. )    ammonium lactate 12 % Crea Apply twice  "daily to affected parts both feet as needed.    aspirin (ECOTRIN) 81 MG EC tablet Take 1 tablet (81 mg total) by mouth once daily.    atorvastatin (LIPITOR) 40 MG tablet Take 1 tablet (40 mg total) by mouth once daily.    BD INSULIN PEN NEEDLE UF MINI 31 gauge x 3/16" Ndle USE 5 NEEDLES PER DAY    carvediloL (COREG) 25 MG tablet Take 1 tablet (25 mg total) by mouth 2 (two) times daily.    citalopram (CELEXA) 20 MG tablet Take 1 tablet (20 mg total) by mouth nightly.    clopidogreL (PLAVIX) 75 mg tablet Take 1 tablet (75 mg total) by mouth once daily.    diclofenac sodium (VOLTAREN) 1 % Gel Apply 2 grams topically 4 (four) times daily.    diphth,pertus,acell,,tetanus (BOOSTRIX TDAP) 2.5-8-5 Lf-mcg-Lf/0.5mL Syrg injection Inject 0.5 mLs into the muscle once. For one dose. for 1 dose    famotidine (PEPCID) 20 MG tablet Take 1 tablet (20 mg total) by mouth nightly as needed.    fish oil-omega-3 fatty acids 300-1,000 mg capsule Take 1 capsule by mouth every morning.    fluticasone (FLONASE) 50 mcg/actuation nasal spray 2 sprays (100 mcg total) by Each Nare route once daily.    gabapentin (NEURONTIN) 400 MG capsule Take 1 capsule (400 mg total) by mouth every evening.    honey (MEDIHONEY, HONEY,) 80 % Gel Apply 1 application topically 3 (three) times daily.    hydrALAZINE (APRESOLINE) 25 MG tablet Take 1 tablet (25 mg total) by mouth 2 (two) times daily.    insulin detemir U-100 (LEVEMIR) 100 unit/mL injection Inject 45 Units into the skin 2 (two) times daily.    insulin lispro (HUMALOG KWIKPEN INSULIN) 100 unit/mL pen BLOOD GLUCOSE 150-200, INJECT 1 UNITS UNDER THE SKIN, 201-250, 2 UNITS, 251-300, 3 UNITS THREE TIMES A DAY AS DIRECTED    insulin lispro (HUMALOG KWIKPEN INSULIN) 100 unit/mL pen BLOOD GLUCOSE 150-200, INJECT 15 UNITS UNDER THE SKIN, 201-250, 18 UNITS, 251-300, 20 UNITS THREE TIMES A DAY AS DIRECTED    insulin lispro (HUMALOG KWIKPEN INSULIN) 100 unit/mL pen BLOOD GLUCOSE 150-200, INJECT 1 " "UNITS UNDER THE SKIN, 201-250, 2 UNITS, 251-300, 3 UNITS THREE TIMES A DAY AS DIRECTED    insulin needles, disposable, (PEN NEEDLE, DIABETIC) 31 Ndle Patient needs 5 needles a day    iron polysacch complex-b12-fa 150-25-1 mg-mcg-mg (FERREX 150 FORTE) 150-25-1 mg-mcg-mg Cap Take 150 mg by mouth once daily.    ketoconazole (NIZORAL) 2 % cream Apply topically once daily.    lancets (ONETOUCH DELICA LANCETS) 33 gauge Misc 1 lancet by Misc.(Non-Drug; Combo Route) route 4 (four) times daily before meals and nightly.    letrozole (FEMARA) 2.5 mg Tab Take 1 tablet (2.5 mg total) by mouth nightly.    pen needle, diabetic 32 gauge x 5/32" Ndle 1 Device by Misc.(Non-Drug; Combo Route) route 5 (five) times daily.     Family History     Problem Relation (Age of Onset)    Arthritis Mother    Breast cancer Maternal Grandmother    Cancer Maternal Grandmother, Maternal Aunt    Celiac disease Sister    Diabetes Father    Heart disease Mother, Father, Maternal Grandmother        Tobacco Use    Smoking status: Current Some Day Smoker     Packs/day: 0.50     Years: 28.00     Pack years: 14.00     Types: Cigarettes     Start date: 1990     Last attempt to quit: 2018     Years since quittin.7    Smokeless tobacco: Never Used    Tobacco comment: anxious   Substance and Sexual Activity    Alcohol use: No     Frequency: Never     Drinks per session: Patient refused     Binge frequency: Never    Drug use: No    Sexual activity: Never     Partners: Male     Review of Systems   Constitutional: Positive for activity change, chills and fever. Negative for appetite change.   HENT: Negative for congestion, sore throat and trouble swallowing.    Eyes: Negative for visual disturbance.   Respiratory: Negative for cough, choking, shortness of breath and wheezing.    Cardiovascular: Positive for leg swelling. Negative for chest pain and palpitations.   Gastrointestinal: Negative for abdominal distention, blood in stool, " constipation, diarrhea, nausea and vomiting.   Genitourinary: Negative for dysuria.   Musculoskeletal: Positive for gait problem. Negative for back pain.   Skin: Positive for color change and wound (Right leg ).   Neurological: Negative for light-headedness and headaches.   Psychiatric/Behavioral: Negative for agitation.     Objective:     Vital Signs (Most Recent):  Temp: 99.6 °F (37.6 °C) (10/13/20 1426)  Pulse: 76 (10/13/20 1748)  Resp: 18 (10/13/20 1748)  BP: (!) 141/78 (10/13/20 1748)  SpO2: 95 % (10/13/20 1748) Vital Signs (24h Range):  Temp:  [97.7 °F (36.5 °C)-99.6 °F (37.6 °C)] 99.6 °F (37.6 °C)  Pulse:  [76-84] 76  Resp:  [18] 18  SpO2:  [95 %] 95 %  BP: (122-173)/(10-78) 141/78     Weight: 124 kg (273 lb 5.9 oz)  Body mass index is 46.92 kg/m².    Physical Exam  Constitutional:       Appearance: She is obese. She is ill-appearing.   HENT:      Head: Normocephalic and atraumatic.      Mouth/Throat:      Mouth: Mucous membranes are moist.   Eyes:      Pupils: Pupils are equal, round, and reactive to light.   Cardiovascular:      Rate and Rhythm: Normal rate.      Pulses: Normal pulses.   Pulmonary:      Effort: Pulmonary effort is normal. No respiratory distress.      Breath sounds: No wheezing.   Abdominal:      General: Bowel sounds are normal. There is no distension.      Palpations: Abdomen is soft.      Tenderness: There is no abdominal tenderness.   Musculoskeletal: Normal range of motion.         General: Deformity (RLE) present.      Right lower leg: Edema present.      Left lower leg: No edema.   Skin:     General: Skin is warm and dry.      Comments: Right lower extremity: Diffuse skin changes int he big toes, black in color, strong smell. Picture in the media. Venous stasis also noted in RLE  Pulse detected bilaterally with doppler US    Neurological:      Mental Status: She is alert and oriented to person, place, and time.   Psychiatric:         Mood and Affect: Mood normal.         Behavior:  Behavior normal.               CRANIAL NERVES     CN III, IV, VI   Pupils are equal, round, and reactive to light.       Significant Labs:   Blood Culture: No results for input(s): LABBLOO in the last 48 hours.  CBC:   Recent Labs   Lab 10/13/20  1706   WBC 5.93   HGB 10.2*   HCT 33.8*        CMP:   Recent Labs   Lab 10/13/20  1706   *   K 4.5   CL 86*   CO2 24   *   BUN 47*   CREATININE 7.2*   CALCIUM 8.8   PROT 7.5   ALBUMIN 3.0*   BILITOT 0.5   ALKPHOS 63   AST 22   ALT 28   ANIONGAP 17*   EGFRNONAA 5.4*     Coagulation: No results for input(s): PT, INR, APTT in the last 48 hours.  All pertinent labs within the past 24 hours have been reviewed.    Significant Imaging: I have reviewed and interpreted all pertinent imaging results/findings within the past 24 hours.

## 2020-10-14 NOTE — PLAN OF CARE
Problem: Fall Injury Risk  Goal: Absence of Fall and Fall-Related Injury  Outcome: Ongoing, Progressing     Problem: Adult Inpatient Plan of Care  Goal: Plan of Care Review  Outcome: Ongoing, Progressing   Alert  and orient times 4. Dressing to Rt foot dry and intact. Able to observe black toe to Rt foot. Received several Iv antibiotic in the ED dept prior to coming to floor.VSS.Slept most of this shift after arrived to unit. No fall this shift.

## 2020-10-14 NOTE — SUBJECTIVE & OBJECTIVE
Scheduled Meds:   amLODIPine  10 mg Oral QHS    aspirin  81 mg Oral Daily    atorvastatin  40 mg Oral Daily    carvediloL  25 mg Oral BID    citalopram  20 mg Oral Nightly    fluticasone propionate  2 spray Each Nostril Daily    gabapentin  400 mg Oral QHS    hydrALAZINE  25 mg Oral BID    insulin aspart U-100  10 Units Subcutaneous TIDWM    insulin detemir U-100  14 Units Subcutaneous BID    letrozole  2.5 mg Oral Nightly     Continuous Infusions:  PRN Meds:acetaminophen, dextrose 50%, dextrose 50%, glucagon (human recombinant), glucose, glucose, insulin aspart U-100, sodium chloride 0.9%    Review of patient's allergies indicates:   Allergen Reactions    Cyclobenzaprine Hallucinations    Erythromycin      Stomach upset        Past Medical History:   Diagnosis Date    Anxiety     Arthritis     DDD, Lumbar    Breast cancer 1/2013    left breast- invasive mammary carcinoma, ER/VT positive, Her2 negative    Carotid artery stenosis 8/13/2018 6/22/2018: Carotid Duplex: SHANELL: Moderate plaquing - 2.0 m/s - <50%. LICA: Moderate plaquing - 1.6 m/s - <50%.    Cataract     Choledocholithiasis     s/p ERCP and stent placement    Chronic obstructive pulmonary disease 6/8/2018    Chronic venous insufficiency     Colon cancer 2001    CRI (chronic renal insufficiency)     one kidney    Dialysis AV fistula malfunction     ESRD (end stage renal disease) on dialysis     Former smoker 6/8/2018    GERD (gastroesophageal reflux disease)     History of acute pancreatitis 2009 2/09 s/p sphincterotomy    History of cerebrovascular accident 6/8/2018    History of colon cancer     s/p sigmoid colectomy    HTN (hypertension), benign     Hypercholesterolemia     Hyperlipidemia     Hypoxemia 6/8/2018    Intracerebral hemorrhage     Kidney stone     left    Lymphedema of both lower extremities     Obesity     Pancreatitis     Pancreatitis 2008    Physical deconditioning     Pulmonary edema      Sacroiliac joint pain     Spinal stenosis     Spinal stenosis, lumbar     Stroke 12/2012    Tobacco abuse     Type 2 diabetes mellitus with neurological manifestations, controlled     Neuropathy     Past Surgical History:   Procedure Laterality Date    ANGIOGRAPHY OF LOWER EXTREMITY Right 9/17/2020    Procedure: Angiogram Extremity Unilateral;  Surgeon: RICK Pollard III, MD;  Location: [address];  Service: Peripheral Vascular;  Laterality: Right;  6.2 minutes of fluro  690.88 mGy  112.64 Gycm2  55 ml    AV FISTULA PLACEMENT Right 5/28/2018    Procedure: CREATION -FISTULA-AV;  Surgeon: RICK Pollard III, MD;  Location: [facility] OR [address] [place] [address];  Service: Peripheral Vascular;  Laterality: Right;    BREAST BIOPSY  1/2012    left breast invasive mammary carcinoma (lateral bx) and intraductal papilloma (medial bx)    BREAST BIOPSY  1999    rt breast FA    CATARACT EXTRACTION W/  INTRAOCULAR LENS IMPLANT Right 1/24/2019        CATARACT EXTRACTION W/  INTRAOCULAR LENS IMPLANT Left 1/31/2019    Procedure: EXTRACTION, CATARACT, WITH IOL INSERTION;  Surgeon: Immanuel Moncada MD;  Location: [facility];  Service: Ophthalmology;  Laterality: Left;    CHOLECYSTECTOMY  2001    COLON SURGERY      HERNIA REPAIR      left nephrectomy      atrophic kidney and hydronephrosis    left oopherectomy  2001    MASTECTOMY  2013    left    NEPHRECTOMY  2011    lap    REVISION OF ARTERIOVENOUS FISTULA Right 8/15/2018    Procedure: REVISION, AV FISTULA;  Surgeon: RICK Pollard III, MD;  Location: [address];  Service: Peripheral Vascular;  Laterality: Right;    SIGMOIDECTOMY  2001    TOTAL REDUCTION MAMMOPLASTY Right 2013       Family History     Problem Relation (Age of Onset)    Arthritis Mother    Breast cancer Maternal Grandmother    Cancer Maternal Grandmother, Maternal Aunt    Celiac disease Sister    Diabetes Father    Heart disease Mother, Father, Maternal Grandmother        Tobacco Use    Smoking status:  Current Some Day Smoker     Packs/day: 0.50     Years: 28.00     Pack years: 14.00     Types: Cigarettes     Start date: 1990     Last attempt to quit: 2018     Years since quittin.7    Smokeless tobacco: Never Used    Tobacco comment: anxious   Substance and Sexual Activity    Alcohol use: No     Frequency: Never     Drinks per session: Patient refused     Binge frequency: Never    Drug use: No    Sexual activity: Never     Partners: Male     Review of Systems   Constitutional: Positive for fatigue. Negative for chills and fever.   Cardiovascular: Positive for leg swelling.   Gastrointestinal: Negative for nausea and vomiting.   Musculoskeletal: Positive for gait problem.   Skin: Positive for color change and wound.     Objective:     Vital Signs (Most Recent):  Temp: 97.4 °F (36.3 °C) (10/14/20 0909)  Pulse: 75 (10/14/20 0909)  Resp: 16 (10/14/20 0909)  BP: 135/65 (10/14/20 0909)  SpO2: (!) 90 % (10/14/20 0909) Vital Signs (24h Range):  Temp:  [97.3 °F (36.3 °C)-99.6 °F (37.6 °C)] 97.4 °F (36.3 °C)  Pulse:  [69-84] 75  Resp:  [16-20] 16  SpO2:  [90 %-99 %] 90 %  BP: (122-173)/(10-97) 135/65     Weight: 129.2 kg (284 lb 13.4 oz)  Body mass index is 48.89 kg/m².    Foot Exam    Right Foot/Ankle     Inspection and Palpation  Ecchymosis: none  Tenderness: none   Swelling: none   Skin Exam: drainage, cellulitis, abnormal color and ulcer;     Neurovascular  Dorsalis pedis: 1+  Posterior tibial: 1+  Saphenous nerve sensation: absent  Tibial nerve sensation: absent  Superficial peroneal nerve sensation: absent  Deep peroneal nerve sensation: absent  Sural nerve sensation: absent      Left Foot/Ankle      Inspection and Palpation  Ecchymosis: none  Tenderness: none   Swelling: none     Neurovascular  Dorsalis pedis: 1+  Posterior tibial: 1+  Saphenous nerve sensation: diminished  Tibial nerve sensation: diminished  Superficial peroneal nerve sensation: diminished  Deep peroneal nerve sensation:  diminished  Sural nerve sensation: diminished            Laboratory:  CBC:   Recent Labs   Lab 10/14/20  0249   WBC 6.37   RBC 3.71*   HGB 10.2*   HCT 34.0*      MCV 92   MCH 27.5   MCHC 30.0*     CMP:   Recent Labs   Lab 10/14/20  0249   *   CALCIUM 8.5*   ALBUMIN 2.9*   PROT 7.2   *   K 4.5   CO2 24   CL 86*   BUN 55*   CREATININE 7.7*   ALKPHOS 58   ALT 27   AST 19   BILITOT 0.6     CRP:   Recent Labs   Lab 10/13/20  1706   .6*     ESR:   Recent Labs   Lab 10/13/20  1706   SEDRATE 85*       Diagnostic Results:  I have reviewed all pertinent imaging results/findings within the past 24 hours.  Imaging Results          MRI Foot (Forefoot) Right Without Contrast (Final result)  Result time 10/13/20 23:30:43    Final result by Steven Addison MD (10/13/20 23:30:43)                 Impression:      Abnormal study involving the proximal and distal phalanx of the 1st digit.  This could be posttraumatic or secondary to infection/osteomyelitis.  Visualization is limited.    Small fractures of the distal 2nd, 3rd and 4th metatarsals with associated edema could be post-traumatic.  Mild edema in the mid 2nd metatarsal also could represent osseous contusion.  Osseous infection is not excluded.    Diffuse edema in the soft tissues of the forefoot could represent cellulitis.  Recommend clinical correlation.    Suboptimal image quality      Electronically signed by: Steven Addison  Date:    10/13/2020  Time:    23:30             Narrative:    EXAMINATION:  MRI FOOT (FOREFOOT) RIGHT WITHOUT CONTRAST    CLINICAL HISTORY:  Osteomyelitis suspected, diabetic;    TECHNIQUE:  Multiplanar MR imaging through the right forefoot.  Axial sagittal coronal imaging.  T1, stir    COMPARISON:  None    FINDINGS:  There is diminished T1 and increased T2/stir signal within the proximal and distal phalanx of the 1st digit consistent with edema.  Abnormal signal within the mid and distal 2nd metatarsal, distal 3rd  metatarsal, distal 4th metatarsal.    There are fractures noted in the distal 2nd, 3rd and 4th metatarsals.    There is soft tissue edema throughout the forefoot.    Image quality is suboptimal with motion artifact.    Edema in the 1st digit could be associated with infection/osteomyelitis.  Recommend clinical correlation and follow-up.    No abscess is identified.                               X-Ray Foot Complete Right (Final result)  Result time 10/13/20 16:47:18    Final result by Lisandro Camacho MD (10/13/20 16:47:18)                 Impression:      1. Fractures as described above, somewhat limited secondary to overlying bandaging material and osteopenia.  Please note, given the appearance of the distal aspect of the proximal phalanx of the great toe, superimposed infection is not excluded and correlation is needed, particularly given the large degree of edema.  Correlation is advised.  Consider follow-up radiography to exclude additional fracture.      Electronically signed by: Lisandro Camacho MD  Date:    10/13/2020  Time:    16:47             Narrative:    EXAMINATION:  XR FOOT COMPLETE 3 VIEW RIGHT    CLINICAL HISTORY:  . Other injury of unspecified body region, initial encounter    TECHNIQUE:  AP, lateral, and oblique views of the right foot were performed.    COMPARISON:  09/03/2020    FINDINGS:  Three views right foot.    There is diffuse osteopenia.  There is impacted fracture involving the distal aspect of the proximal phalanx of the great toe, fracture planes appear to involve the physeal surface.  There is questionable foreshortening of the proximal phalanx of the 2nd toe, concerning for additional fracture although evaluation is limited.  There is fracture involving the distal aspects of the 2nd, 3rd, and 4th metatarsals.  There is remote fracture involving the 5th metatarsal.  There is diffuse edema about the foot.  No dislocation.  There is edema about the dorsal aspect of the foot.   Degenerative changes are noted of the calcaneus.  There is vascular calcification.                                Clinical Findings:  Right foot:  Patient has gangrenous changes to the distal tuft of the right hallux.  There is drainage and periwound erythema with tissue loss extending proximally.  Also has a small wound at the dorsal aspect of the right 2nd toe that has periwound erythema and edema.  There is exposed bone on the 2nd toe wound.    10/14/2020

## 2020-10-14 NOTE — ASSESSMENT & PLAN NOTE
Plan:  -plan for OR amputation tomorrow.  -patient to be NPO at midnight  -patient can weight bear to heel in surgical shoe  -can hold antibiotics for now to increase OR culture yield.  Will initiate antibiotics after specimens were taken.  -podiatry will follow

## 2020-10-14 NOTE — CONSULTS
[facility]  Nephrology  Consult Note    Patient Name: Kylie King  MRN: [id number]  Admission Date: 10/13/2020  Hospital Length of Stay: 1 days  Attending Provider: Gilles Hogue MD   Primary Care Physician: Virginia Foote MD  Principal Problem:Toe infection    Inpatient consult to Nephrology  Consult performed by: Lisandro Olivares NP  Consult ordered by: Mari Aponte MD  Reason for consult: ESRD        Subjective:     HPI: Kylie King is a 66 yo female with HFpEF, HTN, HLD, COPD, DM2, and ESRD on iHD MWF who presented to the ED for R foot pain.  She has had multiple admissions for similar complaints with wound on R great toe s/p SFA stenting on 9/16 and also treated with Vanc/Cefepime that was discontinued on 9/24.  She has had worsening pain over hte past several days along with foul smelling drainage from her wound with fever of 101.8.  She saw Podiatry yesterday and they recommend for her to go the ER for further evaluation.  MRI was obtained, concerning for osteo and she was admitted to hospital medicine with consults to Podiatry and Nephrology for her ESRD management.        Past Medical History:   Diagnosis Date    Anxiety     Arthritis     DDD, Lumbar    Breast cancer 1/2013    left breast- invasive mammary carcinoma, ER/UT positive, Her2 negative    Carotid artery stenosis 8/13/2018 6/22/2018: Carotid Duplex: SHANELL: Moderate plaquing - 2.0 m/s - <50%. LICA: Moderate plaquing - 1.6 m/s - <50%.    Cataract     Choledocholithiasis     s/p ERCP and stent placement    Chronic obstructive pulmonary disease 6/8/2018    Chronic venous insufficiency     Colon cancer 2001    CRI (chronic renal insufficiency)     one kidney    Dialysis AV fistula malfunction     ESRD (end stage renal disease) on dialysis     Former smoker 6/8/2018    GERD (gastroesophageal reflux disease)     History of acute pancreatitis 2009 2/09 s/p sphincterotomy    History of cerebrovascular  accident 6/8/2018    History of colon cancer     s/p sigmoid colectomy    HTN (hypertension), benign     Hypercholesterolemia     Hyperlipidemia     Hypoxemia 6/8/2018    Intracerebral hemorrhage     Kidney stone     left    Lymphedema of both lower extremities     Obesity     Pancreatitis     Pancreatitis 2008    Physical deconditioning     Pulmonary edema     Sacroiliac joint pain     Spinal stenosis     Spinal stenosis, lumbar     Stroke 12/2012    Tobacco abuse     Type 2 diabetes mellitus with neurological manifestations, controlled     Neuropathy       Past Surgical History:   Procedure Laterality Date    ANGIOGRAPHY OF LOWER EXTREMITY Right 9/17/2020    Procedure: Angiogram Extremity Unilateral;  Surgeon: RICK Pollard III, MD;  Location: [facility] OR [address] [place] [address];  Service: Peripheral Vascular;  Laterality: Right;  6.2 minutes of fluro  690.88 mGy  112.64 Gycm2  55 ml    AV FISTULA PLACEMENT Right 5/28/2018    Procedure: CREATION -FISTULA-AV;  Surgeon: RICK Pollard III, MD;  Location: [facility] OR [address] [place] [address];  Service: Peripheral Vascular;  Laterality: Right;    BREAST BIOPSY  1/2012    left breast invasive mammary carcinoma (lateral bx) and intraductal papilloma (medial bx)    BREAST BIOPSY  1999    rt breast FA    CATARACT EXTRACTION W/  INTRAOCULAR LENS IMPLANT Right 1/24/2019        CATARACT EXTRACTION W/  INTRAOCULAR LENS IMPLANT Left 1/31/2019    Procedure: EXTRACTION, CATARACT, WITH IOL INSERTION;  Surgeon: Immanuel Moncada MD;  Location: [facility];  Service: Ophthalmology;  Laterality: Left;    CHOLECYSTECTOMY  2001    COLON SURGERY      HERNIA REPAIR      left nephrectomy      atrophic kidney and hydronephrosis    left oopherectomy  2001    MASTECTOMY  2013    left    NEPHRECTOMY  2011    lap    REVISION OF ARTERIOVENOUS FISTULA Right 8/15/2018    Procedure: REVISION, AV FISTULA;  Surgeon: RICK Pollard III, MD;  Location: [facility] OR [address] [place] [address];  Service: Peripheral Vascular;   Laterality: Right;    SIGMOIDECTOMY  2001    TOTAL REDUCTION MAMMOPLASTY Right 2013       Review of patient's allergies indicates:   Allergen Reactions    Cyclobenzaprine Hallucinations    Erythromycin      Stomach upset     Current Facility-Administered Medications   Medication Frequency    acetaminophen tablet 650 mg Q6H PRN    amLODIPine tablet 10 mg QHS    aspirin EC tablet 81 mg Daily    atorvastatin tablet 40 mg Daily    carvediloL tablet 25 mg BID    citalopram tablet 20 mg Nightly    dextrose 50% injection 12.5 g PRN    dextrose 50% injection 25 g PRN    fluticasone propionate 50 mcg/actuation nasal spray 100 mcg Daily    gabapentin capsule 400 mg QHS    glucagon (human recombinant) injection 1 mg PRN    glucose chewable tablet 16 g PRN    glucose chewable tablet 24 g PRN    hydrALAZINE tablet 25 mg BID    insulin aspart U-100 pen 0-5 Units QID (AC + HS) PRN    insulin aspart U-100 pen 10 Units TIDWM    insulin detemir U-100 pen 14 Units BID    letrozole tablet 2.5 mg Nightly    sodium chloride 0.9% flush 10 mL PRN     Facility-Administered Medications Ordered in Other Encounters   Medication Frequency    lidocaine (PF) 10 mg/ml (1%) injection 10 mg Once    mupirocin 2 % ointment On Call Procedure    mupirocin 2 % ointment On Call Procedure     Family History     Problem Relation (Age of Onset)    Arthritis Mother    Breast cancer Maternal Grandmother    Cancer Maternal Grandmother, Maternal Aunt    Celiac disease Sister    Diabetes Father    Heart disease Mother, Father, Maternal Grandmother        Tobacco Use    Smoking status: Current Some Day Smoker     Packs/day: 0.50     Years: 28.00     Pack years: 14.00     Types: Cigarettes     Start date: 1990     Last attempt to quit: 2018     Years since quittin.7    Smokeless tobacco: Never Used    Tobacco comment: anxious   Substance and Sexual Activity    Alcohol use: No     Frequency: Never     Drinks per session:  Patient refused     Binge frequency: Never    Drug use: No    Sexual activity: Never     Partners: Male     Review of Systems   Constitutional: Positive for fatigue and fever. Negative for activity change, appetite change and chills.   HENT: Positive for congestion and sinus pain. Negative for facial swelling, postnasal drip and sinus pressure.    Respiratory: Positive for shortness of breath. Negative for cough and chest tightness.    Cardiovascular: Negative for chest pain, palpitations and leg swelling.   Gastrointestinal: Negative for abdominal distention, constipation, diarrhea, nausea and rectal pain.   Musculoskeletal: Positive for arthralgias. Negative for myalgias.   Skin: Positive for color change and wound.   Neurological: Negative for dizziness, light-headedness and headaches.   Psychiatric/Behavioral: Negative for agitation, behavioral problems and confusion.     Objective:     Vital Signs (Most Recent):  Temp: 98.6 °F (37 °C) (10/14/20 0930)  Pulse: 72 (10/14/20 1030)  Resp: 20 (10/14/20 0930)  BP: (!) 161/88 (10/14/20 1030)  SpO2: (!) 90 % (10/14/20 0909)  O2 Device (Oxygen Therapy): room air (10/14/20 0930) Vital Signs (24h Range):  Temp:  [97.3 °F (36.3 °C)-99.6 °F (37.6 °C)] 98.6 °F (37 °C)  Pulse:  [69-84] 72  Resp:  [16-20] 20  SpO2:  [90 %-99 %] 90 %  BP: (130-173)/(10-97) 161/88     Weight: 129.2 kg (284 lb 13.4 oz) (10/14/20 0023)  Body mass index is 48.89 kg/m².  Body surface area is 2.42 meters squared.    No intake/output data recorded.    Physical Exam  Constitutional:       General: She is not in acute distress.     Appearance: She is obese. She is not ill-appearing.   HENT:      Head: Normocephalic and atraumatic.      Nose: No congestion or rhinorrhea.      Mouth/Throat:      Mouth: Mucous membranes are moist.      Pharynx: Oropharynx is clear. No oropharyngeal exudate or posterior oropharyngeal erythema.   Eyes:      General: No scleral icterus.        Right eye: No discharge.          Left eye: No discharge.      Extraocular Movements: Extraocular movements intact.      Conjunctiva/sclera: Conjunctivae normal.   Neck:      Musculoskeletal: Normal range of motion and neck supple.   Cardiovascular:      Rate and Rhythm: Normal rate and regular rhythm.      Heart sounds: No murmur. No friction rub. No gallop.    Pulmonary:      Effort: Pulmonary effort is normal. No respiratory distress.      Breath sounds: No wheezing or rales.   Abdominal:      General: There is no distension.      Palpations: Abdomen is soft.      Tenderness: There is no abdominal tenderness.   Musculoskeletal:      Right lower leg: No edema.      Left lower leg: No edema.   Skin:     General: Skin is warm.      Comments: RFA AVF   Neurological:      General: No focal deficit present.      Mental Status: She is alert and oriented to person, place, and time.   Psychiatric:         Mood and Affect: Mood normal.         Behavior: Behavior normal.         Significant Labs:  CBC:   Recent Labs   Lab 10/14/20  0249   WBC 6.37   RBC 3.71*   HGB 10.2*   HCT 34.0*      MCV 92   MCH 27.5   MCHC 30.0*     CMP:   Recent Labs   Lab 10/14/20  0249   *   CALCIUM 8.5*   ALBUMIN 2.9*   PROT 7.2   *   K 4.5   CO2 24   CL 86*   BUN 55*   CREATININE 7.7*   ALKPHOS 58   ALT 27   AST 19   BILITOT 0.6         Assessment/Plan:     * Right toe wound infection   -mgmt per podiatry    Anemia in ESRD (end-stage renal disease)  hgb at goal for ESRD  Monitor, no need for CATY at this time    ESRD (end stage renal disease) on dialysis  ESRD on iHD MWF  [facility]  3.5 hours  Reported EDW of 124 kg  Minimal residual renal fx  RFA AVF    Plan/Recommendations:  -HD today for metabolic clearance/volume management  -UF 2-3L as tolerated  -strict I/O's  -renal diet  -Phos levels daily    Hyperphosphatemia  Low phos diet  Start Renvela with meals    Hyponatremia  Hypervolemic Hyponatremia  -1.5L fluid restrictions  -138 sodium bath        Lisandro  ALICE Alegre NP  Nephrology  [facility]

## 2020-10-14 NOTE — ASSESSMENT & PLAN NOTE
BMI:   Comorbidities include: DM, HTN, ESRD    Plan:   - The goal of therapy is to prevent, treat or reverse to complications of obesity and improve quality of life  - Patient would benefit from comprehensive life style modifications   - Weight loss education   - Encourage exercise   - Dietary modifications Consider consult Dietician for weight loss diet education, appreciate recs   - Consider referral to Bariatric medicine/surgery

## 2020-10-14 NOTE — ASSESSMENT & PLAN NOTE
Ms. King is a 65 yo F with T2DM, ESRD on HD (MWF), HFpEF (EF 50-55%), HTN, HLD, COPD, T2DM, chronic venous insufficiency, and anxiety/depression that presents for worsening R foot wound. She has multiple admission recently, for same complains, treated with course of Vanc and cefepime (stopped 9/24) she is s/p SFA stenting on 09/17/2020 for a completely occluded SFA.     Fever, worsen pain in the right toe, foul smell. likely gangrene of the right foot.   She was seen by podiatry on 10/1, wound debridement was done   Elevated inflammatory makers   Foot xray showed multiple fracture and possible superimposed infections     Ddx include gangrene, superimposed infection, osteomyelitis?     Plan:     - Will start broad spectrum Abx of vanc and cefepime given worsen the wound infections + fever   - Podiatry consulted appreciate recs, will hold on vascular consult at this time as Podiatry planning for amputation of the right toe  - Follow Blood Cx  - Trend lactate   - Npo after midnight for possible intervention   - Will obtain MRI for Osteomyelitis and further evaluation of the fracture>> follow the result   - Will hold Plavix tonight in case Podiatry will intervene tomorrow, resume pending recs tomorrow.

## 2020-10-14 NOTE — ASSESSMENT & PLAN NOTE
ESRD on iHD MWF  [facility]  3.5 hours  Reported EDW of 124 kg  Minimal residual renal fx  RFA AVF    Plan/Recommendations:  -HD today for metabolic clearance/volume management  -UF 2-3L as tolerated  -strict I/O's  -renal diet  -Phos levels daily

## 2020-10-14 NOTE — HPI
"Ms. King is a 63 yo F with T2DM, ESRD on HD (MWF), HFpEF (EF 50-55%), HTN, HLD, COPD, T2DM, chronic venous insufficiency, and anxiety/depression that presents for worsening R foot wound. She has multiple admission recently, for same complains, treated with course of Vanc and cefepime (stopped 9/24) she is s/p SFA stenting on 09/17/2020  for a completely occluded SFA on aspirin and plavix. She reports for the past 3 weeks her symptoms worsening, her foot was continuing to hurt to the point prompt her the visit the ED. The past few days she has noticed some foul-smelling discharge from the foot and has had fever at home with T-max of 101.8° F. She endorses an associated "burning sensation in that foot.  She has chronic neuropathy but denies any new numbness, weakness or calf swelling.  She was fully dialyzed yesterday. Another ROS negatives. She lives with a roommate. Report compliance with her medications and dialysis. She smokes cigarettes occasionally, denied alcohol drinking or IVDA.    "

## 2020-10-14 NOTE — PLAN OF CARE
10/14/20 1008   Discharge Assessment   Assessment Type Discharge Planning Assessment   Confirmed/corrected address and phone number on facesheet? Yes   Assessment information obtained from? Caregiver   Prior to hospitilization cognitive status: Alert/Oriented   Prior to hospitalization functional status: Independent   Current cognitive status: Alert/Oriented   Current Functional Status: Independent   Lives With significant other   Able to Return to Prior Arrangements yes   Is patient able to care for self after discharge? Yes   Who are your caregiver(s) and their phone number(s)? Charan King-spouse  [phone]   Equipment Currently Used at Home rollator   Do you have any problems affording any of your prescribed medications? No   Is the patient taking medications as prescribed? yes   Does the patient have transportation home? Yes   Transportation Anticipated family or friend will provide   Dialysis Name and Scheduled days [facility]   Does the patient receive services at the [facility]? No   Discharge Plan A Home with family   Discharge Plan B Home with family;Home Health   DME Needed Upon Discharge  none   Patient/Family in Agreement with Plan yes   Does the patient have transportation to healthcare appointments? Yes     Virginia Foote MD  [address] / [place] [address]       [facility] [address] [place], [place] [address] - [address]  [address] ([place]) [place] [address]  Phone: [phone] Fax: [phone]    [facility] [address] [address]  Phone: [phone] Fax: [phone]    Payor: [employer] MANAGED MEDICARE / Plan: [employer] MEDICARE PLAN PPO / Product Type: Medicare Advantage /     PCP F/U scheduled.  No DME needs.

## 2020-10-14 NOTE — PROGRESS NOTES
3 hour dialysis complete.  Patient demanded to be taken off machine 1/2 hour early.  CODI Olivares NP aware.  Blood returned.  Needles removed.  Pressure held x 10 minutes.  Hemostasis obtained.  Covered with gauze and paper tape.  +thrill +bruit.  Net UF 1.6L.  Tolerated well.  Transported from dialysis unit to room 643 via bed by transporters.

## 2020-10-15 ENCOUNTER — ANESTHESIA (OUTPATIENT)
Dept: SURGERY | Facility: HOSPITAL | Age: 65
DRG: 617 | End: 2020-10-15
Payer: MEDICARE

## 2020-10-15 LAB
ABO + RH BLD: NORMAL
ALBUMIN SERPL BCP-MCNC: 2.8 G/DL (ref 3.5–5.2)
ALP SERPL-CCNC: 71 U/L (ref 55–135)
ALT SERPL W/O P-5'-P-CCNC: 28 U/L (ref 10–44)
ANION GAP SERPL CALC-SCNC: 15 MMOL/L (ref 8–16)
AST SERPL-CCNC: 21 U/L (ref 10–40)
BASOPHILS # BLD AUTO: 0.05 K/UL (ref 0–0.2)
BASOPHILS NFR BLD: 0.6 % (ref 0–1.9)
BILIRUB SERPL-MCNC: 0.4 MG/DL (ref 0.1–1)
BLD GP AB SCN CELLS X3 SERPL QL: NORMAL
BUN SERPL-MCNC: 43 MG/DL (ref 8–23)
CALCIUM SERPL-MCNC: 8.9 MG/DL (ref 8.7–10.5)
CHLORIDE SERPL-SCNC: 97 MMOL/L (ref 95–110)
CO2 SERPL-SCNC: 21 MMOL/L (ref 23–29)
CREAT SERPL-MCNC: 6.8 MG/DL (ref 0.5–1.4)
DIFFERENTIAL METHOD: ABNORMAL
EOSINOPHIL # BLD AUTO: 0.1 K/UL (ref 0–0.5)
EOSINOPHIL NFR BLD: 1.8 % (ref 0–8)
ERYTHROCYTE [DISTWIDTH] IN BLOOD BY AUTOMATED COUNT: 19.4 % (ref 11.5–14.5)
EST. GFR  (AFRICAN AMERICAN): 6.7 ML/MIN/1.73 M^2
EST. GFR  (NON AFRICAN AMERICAN): 5.8 ML/MIN/1.73 M^2
GLUCOSE SERPL-MCNC: 279 MG/DL (ref 70–110)
GRAM STN SPEC: NORMAL
GRAM STN SPEC: NORMAL
HCT VFR BLD AUTO: 32.5 % (ref 37–48.5)
HGB BLD-MCNC: 9.7 G/DL (ref 12–16)
IMM GRANULOCYTES # BLD AUTO: 0.06 K/UL (ref 0–0.04)
IMM GRANULOCYTES NFR BLD AUTO: 0.8 % (ref 0–0.5)
LYMPHOCYTES # BLD AUTO: 1.2 K/UL (ref 1–4.8)
LYMPHOCYTES NFR BLD: 15.4 % (ref 18–48)
MAGNESIUM SERPL-MCNC: 2.1 MG/DL (ref 1.6–2.6)
MCH RBC QN AUTO: 27.4 PG (ref 27–31)
MCHC RBC AUTO-ENTMCNC: 29.8 G/DL (ref 32–36)
MCV RBC AUTO: 92 FL (ref 82–98)
MONOCYTES # BLD AUTO: 0.8 K/UL (ref 0.3–1)
MONOCYTES NFR BLD: 9.7 % (ref 4–15)
NEUTROPHILS # BLD AUTO: 5.6 K/UL (ref 1.8–7.7)
NEUTROPHILS NFR BLD: 71.7 % (ref 38–73)
NRBC BLD-RTO: 0 /100 WBC
PHOSPHATE SERPL-MCNC: 6.7 MG/DL (ref 2.7–4.5)
PLATELET # BLD AUTO: 272 K/UL (ref 150–350)
PMV BLD AUTO: 11.5 FL (ref 9.2–12.9)
POCT GLUCOSE: 145 MG/DL (ref 70–110)
POCT GLUCOSE: 167 MG/DL (ref 70–110)
POCT GLUCOSE: 192 MG/DL (ref 70–110)
POCT GLUCOSE: 224 MG/DL (ref 70–110)
POCT GLUCOSE: 228 MG/DL (ref 70–110)
POCT GLUCOSE: 228 MG/DL (ref 70–110)
POTASSIUM SERPL-SCNC: 4.5 MMOL/L (ref 3.5–5.1)
PROT SERPL-MCNC: 7 G/DL (ref 6–8.4)
RBC # BLD AUTO: 3.54 M/UL (ref 4–5.4)
SODIUM SERPL-SCNC: 133 MMOL/L (ref 136–145)
VANCOMYCIN SERPL-MCNC: 20.3 UG/ML
WBC # BLD AUTO: 7.8 K/UL (ref 3.9–12.7)

## 2020-10-15 PROCEDURE — 97530 THERAPEUTIC ACTIVITIES: CPT

## 2020-10-15 PROCEDURE — 25000003 PHARM REV CODE 250: Performed by: STUDENT IN AN ORGANIZED HEALTH CARE EDUCATION/TRAINING PROGRAM

## 2020-10-15 PROCEDURE — 99233 PR SUBSEQUENT HOSPITAL CARE,LEVL III: ICD-10-PCS | Mod: GC,,, | Performed by: STUDENT IN AN ORGANIZED HEALTH CARE EDUCATION/TRAINING PROGRAM

## 2020-10-15 PROCEDURE — 87070 CULTURE OTHR SPECIMN AEROBIC: CPT

## 2020-10-15 PROCEDURE — 83735 ASSAY OF MAGNESIUM: CPT

## 2020-10-15 PROCEDURE — 87077 CULTURE AEROBIC IDENTIFY: CPT

## 2020-10-15 PROCEDURE — 87205 SMEAR GRAM STAIN: CPT

## 2020-10-15 PROCEDURE — 88305 TISSUE EXAM BY PATHOLOGIST: ICD-10-PCS | Mod: 26,,, | Performed by: PATHOLOGY

## 2020-10-15 PROCEDURE — 88305 TISSUE EXAM BY PATHOLOGIST: CPT | Mod: 26,,, | Performed by: PATHOLOGY

## 2020-10-15 PROCEDURE — D9220A PRA ANESTHESIA: Mod: ANES,,, | Performed by: ANESTHESIOLOGY

## 2020-10-15 PROCEDURE — 63600175 PHARM REV CODE 636 W HCPCS: Performed by: STUDENT IN AN ORGANIZED HEALTH CARE EDUCATION/TRAINING PROGRAM

## 2020-10-15 PROCEDURE — 14040 PR ADJ TISS XFER HEAD,FAC,HAND <10 SQCM: ICD-10-PCS | Mod: RT,,, | Performed by: PODIATRIST

## 2020-10-15 PROCEDURE — 84100 ASSAY OF PHOSPHORUS: CPT

## 2020-10-15 PROCEDURE — 25000242 PHARM REV CODE 250 ALT 637 W/ HCPCS: Performed by: STUDENT IN AN ORGANIZED HEALTH CARE EDUCATION/TRAINING PROGRAM

## 2020-10-15 PROCEDURE — 37000008 HC ANESTHESIA 1ST 15 MINUTES: Performed by: PODIATRIST

## 2020-10-15 PROCEDURE — 36000707: Performed by: PODIATRIST

## 2020-10-15 PROCEDURE — 85025 COMPLETE CBC W/AUTO DIFF WBC: CPT

## 2020-10-15 PROCEDURE — 36000706: Performed by: PODIATRIST

## 2020-10-15 PROCEDURE — 97165 OT EVAL LOW COMPLEX 30 MIN: CPT

## 2020-10-15 PROCEDURE — 86850 RBC ANTIBODY SCREEN: CPT

## 2020-10-15 PROCEDURE — 27201423 OPTIME MED/SURG SUP & DEVICES STERILE SUPPLY: Performed by: PODIATRIST

## 2020-10-15 PROCEDURE — 82962 GLUCOSE BLOOD TEST: CPT | Performed by: PODIATRIST

## 2020-10-15 PROCEDURE — 88311 DECALCIFY TISSUE: CPT | Mod: 26,,, | Performed by: PATHOLOGY

## 2020-10-15 PROCEDURE — 87176 TISSUE HOMOGENIZATION CULTR: CPT

## 2020-10-15 PROCEDURE — 71000033 HC RECOVERY, INTIAL HOUR: Performed by: PODIATRIST

## 2020-10-15 PROCEDURE — 25000003 PHARM REV CODE 250: Performed by: PODIATRIST

## 2020-10-15 PROCEDURE — 87206 SMEAR FLUORESCENT/ACID STAI: CPT

## 2020-10-15 PROCEDURE — 28810 PR AMPUTATION METATARSAL+TOE,SINGLE: ICD-10-PCS | Mod: 51,T5,, | Performed by: PODIATRIST

## 2020-10-15 PROCEDURE — 25000003 PHARM REV CODE 250: Performed by: NURSE ANESTHETIST, CERTIFIED REGISTERED

## 2020-10-15 PROCEDURE — 94760 N-INVAS EAR/PLS OXIMETRY 1: CPT

## 2020-10-15 PROCEDURE — 87102 FUNGUS ISOLATION CULTURE: CPT

## 2020-10-15 PROCEDURE — 88311 DECALCIFY TISSUE: CPT | Performed by: PATHOLOGY

## 2020-10-15 PROCEDURE — 71000015 HC POSTOP RECOV 1ST HR: Performed by: PODIATRIST

## 2020-10-15 PROCEDURE — 97162 PT EVAL MOD COMPLEX 30 MIN: CPT

## 2020-10-15 PROCEDURE — 63600175 PHARM REV CODE 636 W HCPCS: Performed by: NURSE ANESTHETIST, CERTIFIED REGISTERED

## 2020-10-15 PROCEDURE — 28285 REPAIR OF HAMMERTOE: CPT | Mod: XS,T6,, | Performed by: PODIATRIST

## 2020-10-15 PROCEDURE — 87075 CULTR BACTERIA EXCEPT BLOOD: CPT

## 2020-10-15 PROCEDURE — D9220A PRA ANESTHESIA: ICD-10-PCS | Mod: ANES,,, | Performed by: ANESTHESIOLOGY

## 2020-10-15 PROCEDURE — 99233 SBSQ HOSP IP/OBS HIGH 50: CPT | Mod: GC,,, | Performed by: STUDENT IN AN ORGANIZED HEALTH CARE EDUCATION/TRAINING PROGRAM

## 2020-10-15 PROCEDURE — 80053 COMPREHEN METABOLIC PANEL: CPT

## 2020-10-15 PROCEDURE — 11000001 HC ACUTE MED/SURG PRIVATE ROOM

## 2020-10-15 PROCEDURE — 87186 SC STD MICRODIL/AGAR DIL: CPT

## 2020-10-15 PROCEDURE — 88311 PR  DECALCIFY TISSUE: ICD-10-PCS | Mod: 26,,, | Performed by: PATHOLOGY

## 2020-10-15 PROCEDURE — 80202 ASSAY OF VANCOMYCIN: CPT

## 2020-10-15 PROCEDURE — 88305 TISSUE EXAM BY PATHOLOGIST: CPT | Performed by: PATHOLOGY

## 2020-10-15 PROCEDURE — 37000009 HC ANESTHESIA EA ADD 15 MINS: Performed by: PODIATRIST

## 2020-10-15 PROCEDURE — D9220A PRA ANESTHESIA: ICD-10-PCS | Mod: CRNA,,, | Performed by: NURSE ANESTHETIST, CERTIFIED REGISTERED

## 2020-10-15 PROCEDURE — 28810 AMPUTATION TOE & METATARSAL: CPT | Mod: 51,T5,, | Performed by: PODIATRIST

## 2020-10-15 PROCEDURE — 36415 COLL VENOUS BLD VENIPUNCTURE: CPT

## 2020-10-15 PROCEDURE — 28285 PR REPAIR OF HAMMERTOE,ONE: ICD-10-PCS | Mod: XS,T6,, | Performed by: PODIATRIST

## 2020-10-15 PROCEDURE — 14040 TIS TRNFR F/C/C/M/N/A/G/H/F: CPT | Mod: RT,,, | Performed by: PODIATRIST

## 2020-10-15 PROCEDURE — C9399 UNCLASSIFIED DRUGS OR BIOLOG: HCPCS | Performed by: STUDENT IN AN ORGANIZED HEALTH CARE EDUCATION/TRAINING PROGRAM

## 2020-10-15 PROCEDURE — 87116 MYCOBACTERIA CULTURE: CPT

## 2020-10-15 PROCEDURE — D9220A PRA ANESTHESIA: Mod: CRNA,,, | Performed by: NURSE ANESTHETIST, CERTIFIED REGISTERED

## 2020-10-15 PROCEDURE — 63600175 PHARM REV CODE 636 W HCPCS: Performed by: ANESTHESIOLOGY

## 2020-10-15 RX ORDER — PROPOFOL 10 MG/ML
VIAL (ML) INTRAVENOUS
Status: DISCONTINUED | OUTPATIENT
Start: 2020-10-15 | End: 2020-10-15

## 2020-10-15 RX ORDER — SODIUM CHLORIDE 9 MG/ML
INJECTION, SOLUTION INTRAVENOUS CONTINUOUS PRN
Status: DISCONTINUED | OUTPATIENT
Start: 2020-10-15 | End: 2020-10-15

## 2020-10-15 RX ORDER — SODIUM CHLORIDE 0.9 % (FLUSH) 0.9 %
3 SYRINGE (ML) INJECTION
Status: DISCONTINUED | OUTPATIENT
Start: 2020-10-15 | End: 2020-10-17 | Stop reason: HOSPADM

## 2020-10-15 RX ORDER — ONDANSETRON 2 MG/ML
4 INJECTION INTRAMUSCULAR; INTRAVENOUS DAILY PRN
Status: DISCONTINUED | OUTPATIENT
Start: 2020-10-15 | End: 2020-10-17 | Stop reason: HOSPADM

## 2020-10-15 RX ORDER — KETAMINE HCL IN 0.9 % NACL 50 MG/5 ML
SYRINGE (ML) INTRAVENOUS
Status: DISCONTINUED | OUTPATIENT
Start: 2020-10-15 | End: 2020-10-15

## 2020-10-15 RX ORDER — MIDAZOLAM HYDROCHLORIDE 1 MG/ML
INJECTION, SOLUTION INTRAMUSCULAR; INTRAVENOUS
Status: DISCONTINUED | OUTPATIENT
Start: 2020-10-15 | End: 2020-10-15

## 2020-10-15 RX ORDER — PROPOFOL 10 MG/ML
VIAL (ML) INTRAVENOUS CONTINUOUS PRN
Status: DISCONTINUED | OUTPATIENT
Start: 2020-10-15 | End: 2020-10-15

## 2020-10-15 RX ORDER — LIDOCAINE HYDROCHLORIDE 10 MG/ML
INJECTION, SOLUTION EPIDURAL; INFILTRATION; INTRACAUDAL; PERINEURAL
Status: DISCONTINUED | OUTPATIENT
Start: 2020-10-15 | End: 2020-10-15 | Stop reason: HOSPADM

## 2020-10-15 RX ORDER — SODIUM CHLORIDE 0.9 % (FLUSH) 0.9 %
10 SYRINGE (ML) INJECTION
Status: DISCONTINUED | OUTPATIENT
Start: 2020-10-15 | End: 2020-10-17 | Stop reason: HOSPADM

## 2020-10-15 RX ORDER — LIDOCAINE HYDROCHLORIDE 20 MG/ML
INJECTION INTRAVENOUS
Status: DISCONTINUED | OUTPATIENT
Start: 2020-10-15 | End: 2020-10-15

## 2020-10-15 RX ORDER — BUPIVACAINE HYDROCHLORIDE 5 MG/ML
INJECTION, SOLUTION EPIDURAL; INTRACAUDAL
Status: DISCONTINUED | OUTPATIENT
Start: 2020-10-15 | End: 2020-10-15 | Stop reason: HOSPADM

## 2020-10-15 RX ORDER — INSULIN ASPART 100 [IU]/ML
12 INJECTION, SOLUTION INTRAVENOUS; SUBCUTANEOUS
Status: DISCONTINUED | OUTPATIENT
Start: 2020-10-15 | End: 2020-10-17 | Stop reason: HOSPADM

## 2020-10-15 RX ORDER — SEVELAMER CARBONATE 800 MG/1
800 TABLET, FILM COATED ORAL
Status: DISCONTINUED | OUTPATIENT
Start: 2020-10-15 | End: 2020-10-17 | Stop reason: HOSPADM

## 2020-10-15 RX ORDER — HYDROMORPHONE HYDROCHLORIDE 1 MG/ML
0.2 INJECTION, SOLUTION INTRAMUSCULAR; INTRAVENOUS; SUBCUTANEOUS EVERY 5 MIN PRN
Status: DISCONTINUED | OUTPATIENT
Start: 2020-10-15 | End: 2020-10-16

## 2020-10-15 RX ORDER — CEFEPIME HYDROCHLORIDE 2 G/1
2 INJECTION, POWDER, FOR SOLUTION INTRAVENOUS ONCE
Status: COMPLETED | OUTPATIENT
Start: 2020-10-15 | End: 2020-10-15

## 2020-10-15 RX ORDER — FENTANYL CITRATE 50 UG/ML
50 INJECTION, SOLUTION INTRAMUSCULAR; INTRAVENOUS EVERY 5 MIN PRN
Status: DISCONTINUED | OUTPATIENT
Start: 2020-10-15 | End: 2020-10-16

## 2020-10-15 RX ADMIN — GABAPENTIN 400 MG: 400 CAPSULE ORAL at 09:10

## 2020-10-15 RX ADMIN — CARVEDILOL 25 MG: 25 TABLET, FILM COATED ORAL at 09:10

## 2020-10-15 RX ADMIN — HYDRALAZINE HYDROCHLORIDE 25 MG: 25 TABLET, FILM COATED ORAL at 09:10

## 2020-10-15 RX ADMIN — ATORVASTATIN CALCIUM 40 MG: 20 TABLET, FILM COATED ORAL at 08:10

## 2020-10-15 RX ADMIN — PROPOFOL 30 MG: 10 INJECTION, EMULSION INTRAVENOUS at 02:10

## 2020-10-15 RX ADMIN — INSULIN ASPART 1 UNITS: 100 INJECTION, SOLUTION INTRAVENOUS; SUBCUTANEOUS at 11:10

## 2020-10-15 RX ADMIN — LETROZOLE 2.5 MG: 2.5 TABLET ORAL at 09:10

## 2020-10-15 RX ADMIN — FLUTICASONE PROPIONATE 100 MCG: 50 SPRAY, METERED NASAL at 10:10

## 2020-10-15 RX ADMIN — HYDRALAZINE HYDROCHLORIDE 25 MG: 25 TABLET, FILM COATED ORAL at 08:10

## 2020-10-15 RX ADMIN — CARVEDILOL 25 MG: 25 TABLET, FILM COATED ORAL at 08:10

## 2020-10-15 RX ADMIN — MIDAZOLAM HYDROCHLORIDE 1 MG: 1 INJECTION, SOLUTION INTRAMUSCULAR; INTRAVENOUS at 02:10

## 2020-10-15 RX ADMIN — INSULIN ASPART 2 UNITS: 100 INJECTION, SOLUTION INTRAVENOUS; SUBCUTANEOUS at 08:10

## 2020-10-15 RX ADMIN — ASPIRIN 81 MG: 81 TABLET, COATED ORAL at 08:10

## 2020-10-15 RX ADMIN — SODIUM CHLORIDE: 0.9 INJECTION, SOLUTION INTRAVENOUS at 01:10

## 2020-10-15 RX ADMIN — ACETAMINOPHEN 650 MG: 325 TABLET ORAL at 09:10

## 2020-10-15 RX ADMIN — HYDROMORPHONE HYDROCHLORIDE 0.2 MG: 1 INJECTION, SOLUTION INTRAMUSCULAR; INTRAVENOUS; SUBCUTANEOUS at 09:10

## 2020-10-15 RX ADMIN — SEVELAMER CARBONATE 800 MG: 800 TABLET, FILM COATED ORAL at 06:10

## 2020-10-15 RX ADMIN — LIDOCAINE HYDROCHLORIDE 50 MG: 20 INJECTION, SOLUTION INTRAVENOUS at 02:10

## 2020-10-15 RX ADMIN — Medication 15 MG: at 02:10

## 2020-10-15 RX ADMIN — INSULIN DETEMIR 18 UNITS: 100 INJECTION, SOLUTION SUBCUTANEOUS at 11:10

## 2020-10-15 RX ADMIN — AMLODIPINE BESYLATE 10 MG: 10 TABLET ORAL at 09:10

## 2020-10-15 RX ADMIN — CITALOPRAM HYDROBROMIDE 20 MG: 20 TABLET ORAL at 09:10

## 2020-10-15 RX ADMIN — INSULIN DETEMIR 14 UNITS: 100 INJECTION, SOLUTION SUBCUTANEOUS at 08:10

## 2020-10-15 RX ADMIN — CEFEPIME 2 G: 2 INJECTION, POWDER, FOR SOLUTION INTRAVENOUS at 06:10

## 2020-10-15 RX ADMIN — INSULIN ASPART 12 UNITS: 100 INJECTION, SOLUTION INTRAVENOUS; SUBCUTANEOUS at 06:10

## 2020-10-15 RX ADMIN — PROPOFOL 50 MCG/KG/MIN: 10 INJECTION, EMULSION INTRAVENOUS at 02:10

## 2020-10-15 NOTE — PLAN OF CARE
Pre op nursing care complete. Awaiting consents. No family present. Old peripheral iv was infiltrated on arrival. New IV placed to upper left arm. Patient resting comfortably without complaints.

## 2020-10-15 NOTE — ASSESSMENT & PLAN NOTE
today from 130, 127  Improving with dialysis  Hypervolemic Hyponatremia  -1.5L fluid restrictions

## 2020-10-15 NOTE — ASSESSMENT & PLAN NOTE
Last Hemoglobin A1C 8.8 on 9/3  Home regimen: Home meds: levemir 45U BID, Novolog 15U TIDWM    Plan:   - Hold home meds   - Glucose goals while inpatient 140-180s  - Glucose checks q6   - 18 units bid + 12 Us TID WM with LDSS >> Adjust as needed   - Diet: Diabetic/Renal   - She needs a follow up with Endocrinology, diabetic diet education

## 2020-10-15 NOTE — PROGRESS NOTES
"[facility] Hospital Medicine  Progress Note    Patient Name: Kylie King  MRN: [id number]  Patient Class: IP- Inpatient   Admission Date: 10/13/2020  Length of Stay: 2 days  Attending Physician: Gilles Hogue MD  Primary Care Provider: Virginia Foote MD    Hospital Medicine Team: [facility] 5 Trent Lopez MD    Subjective:     Principal Problem:Toe infection        HPI:  Ms. King is a 65 yo F with T2DM, ESRD on HD (MWF), HFpEF (EF 50-55%), HTN, HLD, COPD, T2DM, chronic venous insufficiency, and anxiety/depression that presents for worsening R foot wound. She has multiple admission recently, for same complains, treated with course of Vanc and cefepime (stopped 9/24) she is s/p SFA stenting on 09/17/2020  for a completely occluded SFA on aspirin and plavix. She reports for the past 3 weeks her symptoms worsening, her foot was continuing to hurt to the point prompt her the visit the ED. The past few days she has noticed some foul-smelling discharge from the foot and has had fever at home with T-max of 101.8° F. She endorses an associated "burning sensation in that foot.  She has chronic neuropathy but denies any new numbness, weakness or calf swelling.  She was fully dialyzed yesterday. Another ROS negatives. She lives with a roommate. Report compliance with her medications and dialysis. She smokes cigarettes occasionally, denied alcohol drinking or IVDA.      Overview/Hospital Course:  65 year old female with chronic osteomyelitis of the right foot comes in with worsening wound, fevers and increased pain. Podiatry was consulted and they plan for toe amputation today in the OR.    Interval History: NAEON. NPO for OR today. Is feeling tired. Otherwise no other complaints/symptoms.    Review of Systems   Constitutional: Positive for activity change and fatigue. Negative for appetite change, chills and fever.   HENT: Negative for congestion, sore throat and trouble swallowing.  "   Eyes: Negative for visual disturbance.   Respiratory: Negative for cough, choking, shortness of breath and wheezing.    Cardiovascular: Positive for leg swelling. Negative for chest pain and palpitations.   Gastrointestinal: Negative for abdominal distention, blood in stool, constipation, diarrhea, nausea and vomiting.   Genitourinary: Negative for dysuria.   Musculoskeletal: Positive for gait problem. Negative for back pain.   Skin: Positive for color change and wound (Right leg ).   Neurological: Negative for light-headedness and headaches.   Psychiatric/Behavioral: Negative for agitation.     Objective:     Vital Signs (Most Recent):  Temp: 98.3 °F (36.8 °C) (10/15/20 1300)  Pulse: 70 (10/15/20 1300)  Resp: 20 (10/15/20 1300)  BP: 130/71 (10/15/20 1300)  SpO2: 97 % (10/15/20 1300) Vital Signs (24h Range):  Temp:  [97.4 °F (36.3 °C)-98.9 °F (37.2 °C)] 98.3 °F (36.8 °C)  Pulse:  [70-83] 70  Resp:  [16-20] 20  SpO2:  [90 %-97 %] 97 %  BP: (127-182)/(62-92) 130/71     Weight: 128.8 kg (284 lb)  Body mass index is 48.75 kg/m².    Intake/Output Summary (Last 24 hours) at 10/15/2020 1327  Last data filed at 10/14/2020 2117  Gross per 24 hour   Intake 300 ml   Output 100 ml   Net 200 ml      Physical Exam  Constitutional:       General: She is not in acute distress.     Appearance: She is obese. She is not ill-appearing.   HENT:      Head: Normocephalic and atraumatic.      Mouth/Throat:      Mouth: Mucous membranes are moist.      Pharynx: Oropharynx is clear.   Eyes:      Conjunctiva/sclera: Conjunctivae normal.      Pupils: Pupils are equal, round, and reactive to light.   Cardiovascular:      Rate and Rhythm: Normal rate.      Pulses: Normal pulses.   Pulmonary:      Effort: Pulmonary effort is normal. No respiratory distress.      Breath sounds: No wheezing.   Abdominal:      General: Bowel sounds are normal. There is no distension.      Palpations: Abdomen is soft.      Tenderness: There is no abdominal  tenderness.   Musculoskeletal: Normal range of motion.         General: Deformity (RLE) present.      Right lower leg: Edema present.      Left lower leg: No edema.   Skin:     General: Skin is warm and dry.      Comments: Right lower extremity: Diffuse skin changes int he big toes, black in color, strong smell. Picture in the media. Venous stasis also noted in RLE  Pulse detected bilaterally with doppler US    Neurological:      General: No focal deficit present.      Mental Status: She is alert and oriented to person, place, and time.   Psychiatric:         Mood and Affect: Mood normal.         Behavior: Behavior normal.         Significant Labs:   CBC:   Recent Labs   Lab 10/13/20  1706 10/14/20  0249 10/15/20  0426   WBC 5.93 6.37 7.80   HGB 10.2* 10.2* 9.7*   HCT 33.8* 34.0* 32.5*    249 272     CMP:   Recent Labs   Lab 10/13/20  1706 10/14/20  0249 10/15/20  0426   * 130* 133*   K 4.5 4.5 4.5   CL 86* 86* 97   CO2 24 24 21*   * 286* 279*   BUN 47* 55* 43*   CREATININE 7.2* 7.7* 6.8*   CALCIUM 8.8 8.5* 8.9   PROT 7.5 7.2 7.0   ALBUMIN 3.0* 2.9* 2.8*   BILITOT 0.5 0.6 0.4   ALKPHOS 63 58 71   AST 22 19 21   ALT 28 27 28   ANIONGAP 17* 20* 15   EGFRNONAA 5.4* 5.0* 5.8*     Magnesium:   Recent Labs   Lab 10/14/20  0249 10/15/20  0426   MG 1.9 2.1     Recent Labs     10/15/20  0426   PHOS 6.7*         POCT Glucose:   Recent Labs   Lab 10/14/20  2109 10/15/20  0708 10/15/20  1115   POCTGLUCOSE 228* 228* 192*       Significant Imaging: I have reviewed all pertinent imaging results/findings within the past 24 hours.      Assessment/Plan:      * Right toe wound infection   Ms. King is a 65 yo F with T2DM, ESRD on HD (MWF), HFpEF (EF 50-55%), HTN, HLD, COPD, T2DM, chronic venous insufficiency, and anxiety/depression that presents for worsening R foot wound. She has multiple admission recently, for same complains, treated with course of Vanc and cefepime (stopped 9/24) she is s/p SFA stenting on  09/17/2020 for a completely occluded SFA.     Fever, worsen pain in the right toe, foul smell. likely gangrene of the right foot.   She was seen by podiatry on 10/1, wound debridement was done   Elevated inflammatory makers   Foot xray showed multiple fracture and possible superimposed infections     MRI:  1st digit.  posttraumatic or secondary to infection/osteomyelitis.  Visualization is limited. Small fractures of the distal 2nd, 3rd and 4th metatarsals with associated edema could be post-traumatic.    Ddx include gangrene, superimposed infection, osteomyelitis    Lactate normalized    Plan:     - Holding abx for cultures in OR  - Podiatry consulted appreciate recs, will hold on vascular consult at this time as Podiatry planning for amputation of the right toe  - Follow Blood Cx  - Will hold Plavix tonight in case Podiatry will intervene tomorrow, resume pending recs tomorrow.   - ID for help with antibiotics          Hyponatremia   today from 130, 127  Improving with dialysis  Hypervolemic Hyponatremia  -1.5L fluid restrictions      Hyperphosphatemia  Begin sevelemer with meals per nephrology recs      Normocytic anemia  Hb 10     Plan:   - Follow iron panel, folate and b12   - Daily CBC   - Transfuse for Hb < 7         Depression  Resume home Citalopram 20 mg QHS       ESRD (end stage renal disease) on dialysis  HD MWF  Last session was 10/15    Consult Nephrology for dialysis while in hospital       Diabetic ulcer of right heel associated with type 2 diabetes mellitus, with fat layer exposed        Debility  PT/OT       Morbid obesity  BMI:   Comorbidities include: DM, HTN, ESRD    Plan:   - The goal of therapy is to prevent, treat or reverse to complications of obesity and improve quality of life  - Patient would benefit from comprehensive life style modifications   - Weight loss education   - Encourage exercise   - Dietary modifications Consider consult Dietician for weight loss diet education,  appreciate recs   - Consider referral to Bariatric medicine/surgery         DDD (degenerative disc disease), lumbar  Continue home Gabapentin     Diabetic peripheral neuropathy associated with type 2 diabetes mellitus  Continue home Gabapentin     Type 2 diabetes mellitus  Last Hemoglobin A1C 8.8 on 9/3  Home regimen: Home meds: levemir 45U BID, Novolog 15U TIDWM    Plan:   - Hold home meds   - Glucose goals while inpatient 140-180s  - Glucose checks q6   - 18 units bid + 12 Us TID WM with LDSS >> Adjust as needed   - Diet: Diabetic/Renal   - She needs a follow up with Endocrinology, diabetic diet education         Hypercholesterolemia  Continue home statin       Essential hypertension  Hypertensive on arrival  Continue home amlodipine 10mg, coreg 12.5mg BID (has been taking 25mg daily), hydralazine 25mg daily    VTE Risk Mitigation (From admission, onward)         Ordered     IP VTE HIGH RISK PATIENT  Once      10/13/20 1914     Place sequential compression device  Until discontinued      10/13/20 1914                Discharge Planning   UMAIR:      Code Status: Prior   Is the patient medically ready for discharge?:     Reason for patient still in hospital (select all that apply): Patient trending condition and Treatment  Discharge Plan A: Home with family, Home Health                  Trent Lopez MD  Department of Hospital Medicine   [facility]

## 2020-10-15 NOTE — HOSPITAL COURSE
65 year old female with chronic osteomyelitis of the right foot comes in with worsening wound, fevers and increased pain. Podiatry was consulted and the toe was amputated. Cultures preliminarily gre proteus species. ID consulted, recommend IV Cefepime 2g after dialysis MWF with close follow up. PT/OT recommend home health but patient refused. Will have close follow up with ID and podiatry. Referral to outpt PT made.     Objective:     Vital Signs (Most Recent):   Temp: 97.9 °F (36.6 °C) (10/17/20 0732)   Pulse: 66 (10/17/20 0732)   Resp: 18 (10/17/20 0732)   BP: (!) 107/59 (10/17/20 0732)   SpO2: 96 % (10/17/20 0732) Vital Signs (24h Range):   Temp: [97.5 °F (36.4 °C)-100.3 °F (37.9 °C)] 97.9 °F (36.6 °C)   Pulse: [66-91] 66   Resp: [16-20] 18   SpO2: [88 %-96 %] 96 %   BP: (107-192)/(54-98) 107/59   Weight: 128.8 kg (284 lb)   Body mass index is 48.75 kg/m².     Intake/Output Summary (Last 24 hours) at 10/17/2020 1001   Last data filed at 10/16/2020 1057       Gross per 24 hour   Intake 600 ml   Output 2600 ml   Net -2000 ml     Physical Exam   Constitutional:   General: She is not in acute distress.  Appearance: She is obese. She is not ill-appearing.   HENT:   Head: Normocephalic and atraumatic.   Mouth/Throat:   Mouth: Mucous membranes are moist.   Pharynx: Oropharynx is clear.   Eyes:   Conjunctiva/sclera: Conjunctivae normal.   Pupils: Pupils are equal, round, and reactive to light.   Cardiovascular:   Rate and Rhythm: Normal rate.   Pulses: Normal pulses.   Pulmonary:   Effort: Pulmonary effort is normal. No respiratory distress.   Breath sounds: No wheezing.   Abdominal:   General: Bowel sounds are normal. There is no distension.   Palpations: Abdomen is soft.   Tenderness: There is no abdominal tenderness.   Musculoskeletal: Normal range of motion.   General: Deformity (RLE) present.   Right lower leg: Edema present.   Left lower leg: No edema.   Skin:   General: Skin is warm and dry.   Comments: S/p toe  amputation  Venous stasis also noted in RLE    Neurological:   General: No focal deficit present.   Mental Status: She is alert and oriented to person, place, and time.   Psychiatric:   Mood and Affect: Mood normal.   Behavior: Behavior normal.

## 2020-10-15 NOTE — PLAN OF CARE
SW assigned to case today 10/15/20. SW will assist team with DC needs. SHILPI in communication with CM.    Elsa Maldonado LMSW   - Case Management

## 2020-10-15 NOTE — PT/OT/SLP EVAL
Occupational Therapy   Evaluation/Treatment    Name: Kylie King  MRN: [id number]  Admitting Diagnosis:  Toe infection Day of Surgery    Recommendations:     Discharge Recommendations: home health OT  Discharge Equipment Recommendations:  walker, rolling  Barriers to discharge:  None    Assessment:     Kylie King is a 65 y.o. female with a medical diagnosis of Toe infection. Performance deficits affecting function: weakness, impaired endurance, impaired self care skills, impaired functional mobilty, gait instability, impaired balance, orthopedic precautions, decreased lower extremity function. Patient would benefit from continued skilled acute OT 3x/wk to improve functional mobility, increase independence with ADLs, and address established goals. Recommending HHOT once medically appropriate for discharge to increase maximal independence, reduce burden of care, and ensure safety.     Rehab Prognosis: Good; patient would benefit from acute skilled OT services to address these deficits and reach maximum level of function.       Plan:     Patient to be seen 3 x/week to address the above listed problems via self-care/home management, therapeutic activities, therapeutic exercises  · Plan of Care Expires: 11/15/20  · Plan of Care Reviewed with: patient    Subjective     Chief Complaint:  B LEs go weak and reports she gets fatigued during cooking at home  Patient/Family Comments/goals: to go home    Occupational Profile:  Living Environment: Patient lives in a [facility] with 5 MARIA LUISA to enter with B HRs with a roommate. Patient has a walk in shower with stool and grab bars. Patient has a regular toilet with a grab bar. Patient owns a rollator and a w/c (wheelchair is way too big and patient is unable to use it appropriately). Patient has a , but they are  at this time. However, he does assist her with getting to and from dialysis and getting her up and down the stairs at home, and runs her errands). Patient  was independent with ADLs and functional mobility PTA.    Pain/Comfort:  · Pain Rating 1: 5/10  · Location - Side 1: Right  · Location - Orientation 1: generalized  · Location 1: foot  · Pain Addressed 1: Reposition, Distraction  · Pain Rating Post-Intervention 1: 5/10    Patients cultural, spiritual, Yarsanism conflicts given the current situation: no    Objective:     Communicated with: GILDA prior to session.  Patient found sitting EOB with peripheral IV upon OT entry to room.    General Precautions: Standard, fall   Orthopedic Precautions:RLE weight bearing as tolerated(when wearing DARCO shoe)   Braces: (DARCO shoe)     Occupational Performance:    Functional Mobility/Transfers:  · Patient completed Sit <> Stand Transfer with modified independence  with  no assistive device and rolling walker   · Patient completed Toilet Transfer bed<>toilet with functional ambulation technique with stand by assistance with  rolling walker. Patient hit R shoe on legs of tray table and corner of wall in room during ambulation, but did not have a loss of balance.    Activities of Daily Living:  · Upper Body Dressing: setup so patient will be able to doff pullover gown and don front gown EOB (Patient did not want to change it when this therapist was in room due to wanting to wash up prior to doing so as she was getting ready to go to surgery on this date)    · Lower Body Dressing: independence per patient report with doffing and donning L sock and R DARCO shoe, but patient did not perform task as she wanted to get ready for surgery.    Cognitive/Visual Perceptual:  Cognitive/Psychosocial Skills:     -       Oriented to: Person, Place, Time and Situation   -       Follows Commands/attention:Follows multistep  commands  -       Communication: clear/fluent  -       Memory: No Deficits noted  -       Safety awareness/insight to disability: intact   -       Mood/Affect/Coping skills/emotional control: Appropriate to  situation  Visual/Perceptual:      -Intact      Physical Exam:  Upper Extremity Range of Motion:     -       Right Upper Extremity: WFL  -       Left Upper Extremity: WFL  Upper Extremity Strength:    -       Right Upper Extremity: WFL  -       Left Upper Extremity: WFL   Strength:    -       Right Upper Extremity: WFL  -       Left Upper Extremity: WFL  Fine Motor Coordination:    -       Intact  Gross motor coordination:   WFL    AMPAC 6 Click ADL:  AMPAC Total Score: 19    Treatment & Education:  Role of OT and POC  Safety  ADL retraining  Functional mobility training  Discharge planning  Education:  Patient educated on performing ADL and IADL tasks at home while utilizing energy conservation strategies (for example: taking breaks and cutting food seated in a chair then stand for over the stove cooking)    Patient left sitting EOB with call button in reach and all needs met.     GOALS:   Multidisciplinary Problems     Occupational Therapy Goals        Problem: Occupational Therapy Goal    Goal Priority Disciplines Outcome Interventions   Occupational Therapy Goal     OT, PT/OT Ongoing, Progressing    Description: Goals to be met by:  10/30/2020    Patient will increase functional independence with ADLs by performing:    UE Dressing with Modified [place].  LE Dressing with Modified [place].  Grooming while standing at sink with Modified [place].  Toileting from toilet with Modified [place] for hygiene and clothing management.   Supine to sit with Modified [place].  Stand pivot transfers with Modified [place].  Toilet transfer to toilet with Modified [place].                     History:     Past Medical History:   Diagnosis Date    Anxiety     Arthritis     DDD, Lumbar    Breast cancer 1/2013    left breast- invasive mammary carcinoma, ER/[place] positive, Her2 negative    Carotid artery stenosis 8/13/2018 6/22/2018: Carotid Duplex: SHANELL: Moderate plaquing - 2.0 m/s - <50%.  LICA: Moderate plaquing - 1.6 m/s - <50%.    Cataract     Choledocholithiasis     s/p ERCP and stent placement    Chronic obstructive pulmonary disease 6/8/2018    Chronic venous insufficiency     Colon cancer 2001    CRI (chronic renal insufficiency)     one kidney    Dialysis AV fistula malfunction     ESRD (end stage renal disease) on dialysis     Former smoker 6/8/2018    GERD (gastroesophageal reflux disease)     History of acute pancreatitis 2009 2/09 s/p sphincterotomy    History of cerebrovascular accident 6/8/2018    History of colon cancer     s/p sigmoid colectomy    HTN (hypertension), benign     Hypercholesterolemia     Hyperlipidemia     Hypoxemia 6/8/2018    Intracerebral hemorrhage     Kidney stone     left    Lymphedema of both lower extremities     Obesity     Pancreatitis     Pancreatitis 2008    Physical deconditioning     Pulmonary edema     Sacroiliac joint pain     Spinal stenosis     Spinal stenosis, lumbar     Stroke 12/2012    Tobacco abuse     Type 2 diabetes mellitus with neurological manifestations, controlled     Neuropathy       Past Surgical History:   Procedure Laterality Date    ANGIOGRAPHY OF LOWER EXTREMITY Right 9/17/2020    Procedure: Angiogram Extremity Unilateral;  Surgeon: RICK Pollard III, MD;  Location: [facility] OR [address] [place] [address];  Service: Peripheral Vascular;  Laterality: Right;  6.2 minutes of fluro  690.88 mGy  112.64 Gycm2  55 ml    AV FISTULA PLACEMENT Right 5/28/2018    Procedure: CREATION -FISTULA-AV;  Surgeon: RICK Pollard III, MD;  Location: [facility] OR [address] [place] [address];  Service: Peripheral Vascular;  Laterality: Right;    BREAST BIOPSY  1/2012    left breast invasive mammary carcinoma (lateral bx) and intraductal papilloma (medial bx)    BREAST BIOPSY  1999    rt breast FA    CATARACT EXTRACTION W/  INTRAOCULAR LENS IMPLANT Right 1/24/2019        CATARACT EXTRACTION W/  INTRAOCULAR LENS IMPLANT Left 1/31/2019    Procedure:  EXTRACTION, CATARACT, WITH IOL INSERTION;  Surgeon: Immanuel Moncada MD;  [facility];  Service: Ophthalmology;  Laterality: Left;    CHOLECYSTECTOMY  2001    COLON SURGERY      HERNIA REPAIR      left nephrectomy      atrophic kidney and hydronephrosis    left oopherectomy  2001    MASTECTOMY  2013    left    NEPHRECTOMY  2011    lap    REVISION OF ARTERIOVENOUS FISTULA Right 8/15/2018    Procedure: REVISION, AV FISTULA;  Surgeon: RICK Pollard III, MD;  Location: [address];  Service: Peripheral Vascular;  Laterality: Right;    SIGMOIDECTOMY  2001    TOTAL REDUCTION MAMMOPLASTY Right 2013       Time Tracking:     OT Date of Treatment: 10/15/20  OT Start Time: 1147  OT Stop Time: 1211  OT Total Time (min): 24 min    Billable Minutes:Evaluation 15  Therapeutic Activity 9    POP Tripp  10/15/2020

## 2020-10-15 NOTE — PLAN OF CARE
AAOx4. No falls today. Pt requested and received a walker for ambulation. VS stable. Pt has been NPO since midnight. Will continue to monitor.

## 2020-10-15 NOTE — PT/OT/SLP EVAL
Physical Therapy Evaluation    Patient Name:  Kylie King   MRN:  [id number]    Recommendations:     Discharge Recommendations:  home health PT   Discharge Equipment Recommendations: walker, rolling   Barriers to discharge: None    Assessment:     Kylie King is a 65 y.o. female admitted with a medical diagnosis of Toe infection.  She presents with the following impairments/functional limitations:  weakness, gait instability, impaired endurance, impaired balance, decreased lower extremity function, orthopedic precautions, impaired functional mobilty.Pt met in supine in bed for PT. She indicated 5/10 pain in R foot and was aware of her pending amputation surgery. Pt was very pleasant and willing to participate. Pt has a rollator and w/c at home, but her PLOF was independent. She has 5 steps to enter her single story home and lives with a roommate who is available to help her as needed. She stated she has friends that can help her after discharge as well. During today's evaluation and treatment, pt was mod(I) for bed mobility requiring the use of handrails to achieve an upright position. She was educated on her precautions, WBAT on R heel only while wearing the surgical boot. Pt ambulated 15ft at [facility] with RW before expressing fatigue and requesting a break. This pt would be a great candidate for home health PT after discharge, but pt stated she did not want to receive HH PT due to her two dogs. PT recommends a RW upon discharge for maximal stability and safety while ambulating at home. Pt demonstrated safety awareness during session and will need a re-evaluation after surgery if performed.     Rehab Prognosis: Good; patient would benefit from acute skilled PT services to address these deficits and reach maximum level of function.    Recent Surgery: Procedure(s) (LRB):  AMPUTATION, TOE (Right) Day of Surgery    Plan:     During this hospitalization, patient to be seen 3 x/week to address the identified rehab  "impairments via gait training, therapeutic activities, therapeutic exercises and progress toward the following goals:    · Plan of Care Expires:  11/15/20    Subjective     Chief Complaint: Difficulty ambulating, R LE pain  Patient/Family Comments/goals: "I want to walk but I am very worried about my balance after surgery, I am also worried about my stairs."  Pain/Comfort:  · Pain Rating 1: 5/10  · Location - Side 1: Right  · Location 1: foot    Patients cultural, spiritual, Anabaptism conflicts given the current situation: no    Living Environment:  Lives with roommate in single story house, 5 steps to enter.  Prior to admission, patients level of function was independent.  Equipment used at home: none DME owned (not currently used): rolling walker and wheelchair.  Upon discharge, patient will have assistance from Roommate and friends.    Objective:     Communicated with NSG prior to session.  Patient found supine with peripheral IV  upon PT entry to room.    General Precautions: Standard, fall   Orthopedic Precautions:RLE weight bearing as tolerated(ONLY when wearing surgical shoe)   Braces: N/A     Exams:  · RLE ROM: WFL painful  · RLE Strength: Deficits: not assessed due to pain  · LLE ROM: WFL  · LLE Strength: WFL    Functional Mobility:  · Bed Mobility:     · Rolling Right: modified independence  · Scooting: modified independence  · Supine to Sit: modified independence  · Sit to Supine: modified independence  · Transfers:     · Sit to Stand:  modified independence with rolling walker  · Bed to Chair: contact guard assistance with  rolling walker  using  Stand Pivot  · Gait: Ambulated 15ft in room with RW at min(A). Pt demonstrated slight LOB by hitting wheel of walker on corner, but was able to correct herself with minimal assistance  · Balance: SBA     Therapeutic Activities and Exercises:   Patient educated on the continued role and goal of PT  Pt educated on LE exercises to perform in bed and in chair to " gain strength to improve functional mobility.    AM-PAC 6 CLICK MOBILITY  Total Score:18     Patient left up in chair with all lines intact and call button in reach.    GOALS:   Multidisciplinary Problems     Physical Therapy Goals        Problem: Physical Therapy Goal    Goal Priority Disciplines Outcome Goal Variances Interventions   Physical Therapy Goal     PT, PT/OT      Description: Goals to be met by: 11/15/2020    Patient will increase functional independence with mobility by performin.. Gait  x 30 feet with Modified [place] using Rolling Walker.   2.. Ascend/descend 5 stair with bilateral Handrails Modified [place] using Rolling Walker.   3.. Stand for 10 minutes with Modified [place] using Rolling Walker                     History:     Past Medical History:   Diagnosis Date    Anxiety     Arthritis     DDD, Lumbar    Breast cancer 2013    left breast- invasive mammary carcinoma, ER/HI positive, Her2 negative    Carotid artery stenosis 2018: Carotid Duplex: SHANELL: Moderate plaquing - 2.0 m/s - <50%. LICA: Moderate plaquing - 1.6 m/s - <50%.    Cataract     Choledocholithiasis     s/p ERCP and stent placement    Chronic obstructive pulmonary disease 2018    Chronic venous insufficiency     Colon cancer     CRI (chronic renal insufficiency)     one kidney    Dialysis AV fistula malfunction     ESRD (end stage renal disease) on dialysis     Former smoker 2018    GERD (gastroesophageal reflux disease)     History of acute pancreatitis  s/p sphincterotomy    History of cerebrovascular accident 2018    History of colon cancer     s/p sigmoid colectomy    HTN (hypertension), benign     Hypercholesterolemia     Hyperlipidemia     Hypoxemia 2018    Intracerebral hemorrhage     Kidney stone     left    Lymphedema of both lower extremities     Obesity     Pancreatitis     Pancreatitis 2008    Physical deconditioning      Pulmonary edema     Sacroiliac joint pain     Spinal stenosis     Spinal stenosis, lumbar     Stroke 12/2012    Tobacco abuse     Type 2 diabetes mellitus with neurological manifestations, controlled     Neuropathy       Past Surgical History:   Procedure Laterality Date    ANGIOGRAPHY OF LOWER EXTREMITY Right 9/17/2020    Procedure: Angiogram Extremity Unilateral;  Surgeon: RICK Pollard III, MD;  Location: [address];  Service: Peripheral Vascular;  Laterality: Right;  6.2 minutes of fluro  690.88 mGy  112.64 Gycm2  55 ml    AV FISTULA PLACEMENT Right 5/28/2018    Procedure: CREATION -FISTULA-AV;  Surgeon: RICK Pollard III, MD;  Location: [address];  Service: Peripheral Vascular;  Laterality: Right;    BREAST BIOPSY  1/2012    left breast invasive mammary carcinoma (lateral bx) and intraductal papilloma (medial bx)    BREAST BIOPSY  1999    rt breast FA    CATARACT EXTRACTION W/  INTRAOCULAR LENS IMPLANT Right 1/24/2019        CATARACT EXTRACTION W/  INTRAOCULAR LENS IMPLANT Left 1/31/2019    Procedure: EXTRACTION, CATARACT, WITH IOL INSERTION;  Surgeon: Immanuel Moncada MD;  Location[facility];  Service: Ophthalmology;  Laterality: Left;    CHOLECYSTECTOMY  2001    COLON SURGERY      HERNIA REPAIR      left nephrectomy      atrophic kidney and hydronephrosis    left oopherectomy  2001    MASTECTOMY  2013    left    NEPHRECTOMY  2011    lap    REVISION OF ARTERIOVENOUS FISTULA Right 8/15/2018    Procedure: REVISION, AV FISTULA;  Surgeon: RICK Pollard III, MD;  Location: [address];  Service: Peripheral Vascular;  Laterality: Right;    SIGMOIDECTOMY  2001    TOTAL REDUCTION MAMMOPLASTY Right 2013       Time Tracking:     PT Received On: 10/15/20  PT Start Time: 0820     PT Stop Time: 0842  PT Total Time (min): 22 min     Billable Minutes: Evaluation 10 and Gait Training 12      Rayna Muro, PT  10/15/2020

## 2020-10-15 NOTE — PLAN OF CARE
Problem: Occupational Therapy Goal  Goal: Occupational Therapy Goal  Description: Goals to be met by:  10/30/2020    Patient will increase functional independence with ADLs by performing:    UE Dressing with Modified [place].  LE Dressing with Modified [place].  Grooming while standing at sink with Modified [place].  Toileting from toilet with Modified [place] for hygiene and clothing management.   Supine to sit with Modified [place].  Stand pivot transfers with Modified [place].  Toilet transfer to toilet with Modified [place].    Outcome: Ongoing, Progressing   Patient's goals are set.   POP Tripp  10/15/2020

## 2020-10-15 NOTE — BRIEF OP NOTE
[facility]  Brief Operative Note    SUMMARY     Surgery Date: 10/15/2020     Surgeon(s) and Role:     * Billy Robles DPM - Primary  Ryne Mcdonald DPM, PGY-3        Pre-op Diagnosis:  Wound infection [T14.8XXA, L08.9]  Dry gangrene [I96]  Diabetic ulcer of right great toe [E11.621, L97.519]    Post-op Diagnosis:  Post-Op Diagnosis Codes:     * Wound infection [T14.8XXA, L08.9]     * Dry gangrene [I96]     * Diabetic ulcer of right great toe [E11.621, L97.519]    Procedure(s) (LRB):  PARTIAL RAY AMPUTATION GREAT TOE (Right)  2nd PIPJ arthroplasty right foot    Anesthesia: General/MAC    Description of Procedure: Partial right 1st ray amputation and arthroplasty and closure of the right 2nd toe.    Description of the findings of the procedure: Gangrenous tissue. No abscess formation or malodor.     Estimated Blood Loss: 50mL    Estimated Blood Loss has been documented.         Specimens:   Specimen (12h ago, onward)    None          [id number]

## 2020-10-15 NOTE — SUBJECTIVE & OBJECTIVE
Interval History: NAEON. NPO for OR today. Is feeling tired. Otherwise no other complaints/symptoms.    Review of Systems   Constitutional: Positive for activity change and fatigue. Negative for appetite change, chills and fever.   HENT: Negative for congestion, sore throat and trouble swallowing.    Eyes: Negative for visual disturbance.   Respiratory: Negative for cough, choking, shortness of breath and wheezing.    Cardiovascular: Positive for leg swelling. Negative for chest pain and palpitations.   Gastrointestinal: Negative for abdominal distention, blood in stool, constipation, diarrhea, nausea and vomiting.   Genitourinary: Negative for dysuria.   Musculoskeletal: Positive for gait problem. Negative for back pain.   Skin: Positive for color change and wound (Right leg ).   Neurological: Negative for light-headedness and headaches.   Psychiatric/Behavioral: Negative for agitation.     Objective:     Vital Signs (Most Recent):  Temp: 98.3 °F (36.8 °C) (10/15/20 1300)  Pulse: 70 (10/15/20 1300)  Resp: 20 (10/15/20 1300)  BP: 130/71 (10/15/20 1300)  SpO2: 97 % (10/15/20 1300) Vital Signs (24h Range):  Temp:  [97.4 °F (36.3 °C)-98.9 °F (37.2 °C)] 98.3 °F (36.8 °C)  Pulse:  [70-83] 70  Resp:  [16-20] 20  SpO2:  [90 %-97 %] 97 %  BP: (127-182)/(62-92) 130/71     Weight: 128.8 kg (284 lb)  Body mass index is 48.75 kg/m².    Intake/Output Summary (Last 24 hours) at 10/15/2020 1327  Last data filed at 10/14/2020 2117  Gross per 24 hour   Intake 300 ml   Output 100 ml   Net 200 ml      Physical Exam  Constitutional:       General: She is not in acute distress.     Appearance: She is obese. She is not ill-appearing.   HENT:      Head: Normocephalic and atraumatic.      Mouth/Throat:      Mouth: Mucous membranes are moist.      Pharynx: Oropharynx is clear.   Eyes:      Conjunctiva/sclera: Conjunctivae normal.      Pupils: Pupils are equal, round, and reactive to light.   Cardiovascular:      Rate and Rhythm: Normal  rate.      Pulses: Normal pulses.   Pulmonary:      Effort: Pulmonary effort is normal. No respiratory distress.      Breath sounds: No wheezing.   Abdominal:      General: Bowel sounds are normal. There is no distension.      Palpations: Abdomen is soft.      Tenderness: There is no abdominal tenderness.   Musculoskeletal: Normal range of motion.         General: Deformity (RLE) present.      Right lower leg: Edema present.      Left lower leg: No edema.   Skin:     General: Skin is warm and dry.      Comments: Right lower extremity: Diffuse skin changes int he big toes, black in color, strong smell. Picture in the media. Venous stasis also noted in RLE  Pulse detected bilaterally with doppler US    Neurological:      General: No focal deficit present.      Mental Status: She is alert and oriented to person, place, and time.   Psychiatric:         Mood and Affect: Mood normal.         Behavior: Behavior normal.         Significant Labs:   CBC:   Recent Labs   Lab 10/13/20  1706 10/14/20  0249 10/15/20  0426   WBC 5.93 6.37 7.80   HGB 10.2* 10.2* 9.7*   HCT 33.8* 34.0* 32.5*    249 272     CMP:   Recent Labs   Lab 10/13/20  1706 10/14/20  0249 10/15/20  0426   * 130* 133*   K 4.5 4.5 4.5   CL 86* 86* 97   CO2 24 24 21*   * 286* 279*   BUN 47* 55* 43*   CREATININE 7.2* 7.7* 6.8*   CALCIUM 8.8 8.5* 8.9   PROT 7.5 7.2 7.0   ALBUMIN 3.0* 2.9* 2.8*   BILITOT 0.5 0.6 0.4   ALKPHOS 63 58 71   AST 22 19 21   ALT 28 27 28   ANIONGAP 17* 20* 15   EGFRNONAA 5.4* 5.0* 5.8*     Magnesium:   Recent Labs   Lab 10/14/20  0249 10/15/20  0426   MG 1.9 2.1     Recent Labs     10/15/20  0426   PHOS 6.7*         POCT Glucose:   Recent Labs   Lab 10/14/20  2109 10/15/20  0708 10/15/20  1115   POCTGLUCOSE 228* 228* 192*       Significant Imaging: I have reviewed all pertinent imaging results/findings within the past 24 hours.

## 2020-10-15 NOTE — MEDICAL/APP STUDENT
Hospital Medicine   Progress note   Team: [facility] HOSP MED 5 Darya Mortensen   Admit Date: 10/13/2020   UMAIR    Code status: Full code  Principal Problem: Toe infection     Interval hx:   Ms. Kylie King is a 66 yo female with T2DM, ESRD on HD (MWF), HFpEF (EF 50-55%), HTN, HLD, COPD, chronic venous insufficiency, and anxiety/depression who presented with worsening right toe wound.    Patient received dialysis yesterday. Patient has been NPO since midnight as podiatry is performing toe amputation today. Patient had one episode of elevated BP (182/92) overnight and was given her home dose of Hydralazine PO 25 mg early. Patient denied fever, chills, nausea or vomiting. Has had several bowel movements.    ROS   Constitutional: Negative for chills, fever.   HEENT: Negative for congestion, sore throat, and trouble swallowing..  Eyes: Negative for visual disturbance.   Respiratory: Negative for cough, shortness of breath.    Cardiovascular: Negative for chest pain, palpitations. Positive for leg swelling.  Gastrointestinal: Negative for abdominal pain, constipation, diarrhea, nausea, vomiting.   Genitourinary: Negative for dysuria, frequency.   Musculoskeletal: Negative for arthralgias, myalgias. Positive for gait problem.  Skin: Positive for color change and wound (right foot).  Neurological: Negative for dizziness, syncope, weakness, light-headedness.     PEx   Temp:  [97.4 °F (36.3 °C)-98.9 °F (37.2 °C)]   Pulse:  [65-83]   Resp:  [16-18]   BP: (124-182)/(62-92)   SpO2:  [90 %-96 %]      I & O (Last 24H):     Intake/Output Summary (Last 24 hours) at 10/15/2020 1048  Last data filed at 10/14/2020 2117  Gross per 24 hour   Intake 900 ml   Output 2750 ml   Net -1850 ml       General appearance: Obese  HEENT:  conjunctivae/corneas clear, mucous membranes moist   Pulm:   normal respiratory effort, normal breath sounds, no wheezes, rales or rhonchi  Card: RRR, distant heart sounds, S1, S2 normal, nil added sounds  Abd: soft,  non-tender, non-distended, bowel sounds present  Ext: edema present to knees  MSK/Skin: Positive for color change, blistering on both shins R>L, R foot wound with gangrenous toe, calf tenderness  Neuro: AAO x 4    Recent Results (from the past 24 hour(s))   POCT glucose    Collection Time: 10/14/20  4:38 PM   Result Value Ref Range    POCT Glucose 317 (H) 70 - 110 mg/dL   POCT glucose    Collection Time: 10/14/20  9:09 PM   Result Value Ref Range    POCT Glucose 228 (H) 70 - 110 mg/dL   Comprehensive metabolic panel    Collection Time: 10/15/20  4:26 AM   Result Value Ref Range    Sodium 133 (L) 136 - 145 mmol/L    Potassium 4.5 3.5 - 5.1 mmol/L    Chloride 97 95 - 110 mmol/L    CO2 21 (L) 23 - 29 mmol/L    Glucose 279 (H) 70 - 110 mg/dL    BUN, Bld 43 (H) 8 - 23 mg/dL    Creatinine 6.8 (H) 0.5 - 1.4 mg/dL    Calcium 8.9 8.7 - 10.5 mg/dL    Total Protein 7.0 6.0 - 8.4 g/dL    Albumin 2.8 (L) 3.5 - 5.2 g/dL    Total Bilirubin 0.4 0.1 - 1.0 mg/dL    Alkaline Phosphatase 71 55 - 135 U/L    AST 21 10 - 40 U/L    ALT 28 10 - 44 U/L    Anion Gap 15 8 - 16 mmol/L    eGFR if African American 6.7 (A) >60 mL/min/1.73 m^2    eGFR if non African American 5.8 (A) >60 mL/min/1.73 m^2   Magnesium    Collection Time: 10/15/20  4:26 AM   Result Value Ref Range    Magnesium 2.1 1.6 - 2.6 mg/dL   Phosphorus    Collection Time: 10/15/20  4:26 AM   Result Value Ref Range    Phosphorus 6.7 (H) 2.7 - 4.5 mg/dL   CBC auto differential    Collection Time: 10/15/20  4:26 AM   Result Value Ref Range    WBC 7.80 3.90 - 12.70 K/uL    RBC 3.54 (L) 4.00 - 5.40 M/uL    Hemoglobin 9.7 (L) 12.0 - 16.0 g/dL    Hematocrit 32.5 (L) 37.0 - 48.5 %    Mean Corpuscular Volume 92 82 - 98 fL    Mean Corpuscular Hemoglobin 27.4 27.0 - 31.0 pg    Mean Corpuscular Hemoglobin Conc 29.8 (L) 32.0 - 36.0 g/dL    RDW 19.4 (H) 11.5 - 14.5 %    Platelets 272 150 - 350 K/uL    MPV 11.5 9.2 - 12.9 fL    Immature Granulocytes 0.8 (H) 0.0 - 0.5 %    Gran # (ANC) 5.6 1.8 -  7.7 K/uL    Immature Grans (Abs) 0.06 (H) 0.00 - 0.04 K/uL    Lymph # 1.2 1.0 - 4.8 K/uL    Mono # 0.8 0.3 - 1.0 K/uL    Eos # 0.1 0.0 - 0.5 K/uL    Baso # 0.05 0.00 - 0.20 K/uL    nRBC 0 0 /100 WBC    Gran% 71.7 38.0 - 73.0 %    Lymph% 15.4 (L) 18.0 - 48.0 %    Mono% 9.7 4.0 - 15.0 %    Eosinophil% 1.8 0.0 - 8.0 %    Basophil% 0.6 0.0 - 1.9 %    Differential Method Automated    Type & Screen    Collection Time: 10/15/20  7:08 AM   Result Value Ref Range    Group & Rh A POS     Indirect Rosemary NEG    POCT glucose    Collection Time: 10/15/20  7:08 AM   Result Value Ref Range    POCT Glucose 228 (H) 70 - 110 mg/dL       Recent Labs   Lab 10/13/20  2140 10/14/20  0724 10/14/20  0957 10/14/20  1638 10/14/20  2109 10/15/20  0708   POCTGLUCOSE 365* 224* 198* 317* 228* 228*       Hemoglobin A1C   Date Value Ref Range Status   09/03/2020 8.8 (H) 4.0 - 5.6 % Final     Comment:     ADA Screening Guidelines:  5.7-6.4%  Consistent with prediabetes  >or=6.5%  Consistent with diabetes  High levels of fetal hemoglobin interfere with the HbA1C  assay. Heterozygous hemoglobin variants (HbS, HgC, etc)do  not significantly interfere with this assay.   However, presence of multiple variants may affect accuracy.     03/05/2020 9.9 (H) 4.0 - 5.6 % Final     Comment:     ADA Screening Guidelines:  5.7-6.4%  Consistent with prediabetes  >or=6.5%  Consistent with diabetes  High levels of fetal hemoglobin interfere with the HbA1C  assay. Heterozygous hemoglobin variants (HbS, HgC, etc)do  not significantly interfere with this assay.   However, presence of multiple variants may affect accuracy.     11/21/2019 9.9 (H) 4.0 - 5.6 % Final     Comment:     ADA Screening Guidelines:  5.7-6.4%  Consistent with prediabetes  >or=6.5%  Consistent with diabetes  High levels of fetal hemoglobin interfere with the HbA1C  assay. Heterozygous hemoglobin variants (HbS, HgC, etc)do  not significantly interfere with this assay.   However, presence of multiple  variants may affect accuracy.     10/09/2018 7.8 % Final        Active Hospital Problems    Diagnosis  POA    *Right toe wound infection  [L08.9]  Yes    Hyperphosphatemia [E83.39]  Unknown    Hyponatremia [E87.1]  Unknown    Normocytic anemia [D64.9]  Unknown    Gangrene of toe of right foot [I96]  Yes    Depression [F32.9]  Yes    Anemia in ESRD (end-stage renal disease) [N18.6, D63.1]  Yes    ESRD (end stage renal disease) on dialysis [N18.6, Z99.2]  Not Applicable     4/2018: Began HD.      Heart failure, diastolic, chronic [I50.32]  Yes     4/20/2018: Echo: Normal left ventricular size with low normal systolic function. EF 50-55%. Mild LVH. Moderate diastolic dysfunction.      Debility [R53.81]  Yes    Morbid obesity [E66.01]  Yes     6/8/2018: Weight 119 kg. BMI 44.      DDD (degenerative disc disease), lumbar [M51.36]  Yes    Lumbar stenosis [M48.061]  Yes    History of breast cancer [Z85.3]  Not Applicable     2013: Left mastectomy.      Diabetic peripheral neuropathy associated with type 2 diabetes mellitus [E11.42]  Yes    Type 2 diabetes mellitus [E11.9]  Yes     2007: Diagnosed. Complications: CVA & ESRD. Medications: Insulin.      Essential hypertension [I10]  Yes     1999: Diagnosed.      Hypercholesterolemia [E78.00]  Yes     2016: Began statin.        Resolved Hospital Problems   No resolved problems to display.         amLODIPine  10 mg Oral QHS    aspirin  81 mg Oral Daily    atorvastatin  40 mg Oral Daily    carvediloL  25 mg Oral BID    citalopram  20 mg Oral Nightly    fluticasone propionate  2 spray Each Nostril Daily    gabapentin  400 mg Oral QHS    hydrALAZINE  25 mg Oral BID    insulin aspart U-100  12 Units Subcutaneous TIDWM    insulin detemir U-100  18 Units Subcutaneous BID    letrozole  2.5 mg Oral Nightly    sevelamer carbonate  800 mg Oral TID WM     acetaminophen, dextrose 50%, dextrose 50%, glucagon (human recombinant), glucose, glucose, insulin aspart  U-100, sodium chloride 0.9%    Micro:  BC NGTD    Assessment and Plan for problems addressed today:   Ms. Kylie King is a 66 yo female with T2DM, ESRD on HD (MWF), HFpEF (EF 50-55%), HTN, HLD, COPD, chronic venous insufficiency, and anxiety/depression who presented with worsening right toe wound.    1) Right toe wound  - Treated with course of Vanc and cefepime most recently (stopped 9/24) for same complaint  - s/p SFA stenting on 09/17/20 for a completely occluded SFA  - Wound debridement by podiatry on 10/1  - Fever, worsening pain, foul smell, elevated inflammatory markers on presentation  - Xray and CT scan showed multiple fractures & possible superimposed infections, concern for osteomyelitis  -Given 1 dose IV vanc & cefepime in ED  - BC NGTD    Plan:  - NPO since midnight  - Podiatry to perform toe amputation and bone biopsy today, initiate antibiotics afterworks  - ID f/u pending biopsy and BC results    2) ESRD  - BUN 43, Cr 6.8, eGFR 5.8  - HD (MWF), received yesterday  - Monitor CMP  - Nephro consulted    3) T2DM  - Glu 272, POCT 230, Last HbA1C 8.8 (9/3)  - Check glucose after surgery, goal while inpatient 140-180  - Increase Insulin dose to 16 Detemir BID and 11 Aspart TID  - Gabapentin 400 mg for peripheral neuropathy  - Endocrinology f/u outpatient    4) Hyperphosphatemia  - Phos 6.7   - Sevelamer carbonate 800 mg with meals per nephro    5) HTN   - /92, repeat post Hydralazine dose 127/62, goal below 180 systolic inpatient  - Home Hydralazine 25 mg BID, Amlodipine 10 mg once daily, Carvedilol 25 mg BID    6) Normocytic anemia  - Hgb 9.7  - At goal Hgb for ESRD per nephro  - Monitor CBC    7) HLD  - Home Atorvastatin 40 mg once daily    8) Anxiety/Depression  - Citalopram 20 mg once daily    9) MYKE  - Outpatient sleep study    Diet: Diabetic diet after procedure  DVT PPx: Home aspirin & plavix  Code status: Full    Discharge plan and follow up: Home, f/u with outpatient podiatry    Darya  Festus, MSY3  IM-5 Team

## 2020-10-15 NOTE — PLAN OF CARE
10/15/20 1258   Discharge Assessment   Assessment Type Discharge Planning Reassessment   Confirmed/corrected address and phone number on facesheet? Yes   Assessment information obtained from? Caregiver   Prior to hospitilization cognitive status: Alert/Oriented   Prior to hospitalization functional status: Independent;Assistive Equipment   Current cognitive status: Alert/Oriented   Current Functional Status: Independent;Assistive Equipment   Lives With significant other   Able to Return to Prior Arrangements yes   Is patient able to care for self after discharge? Yes   Who are your caregiver(s) and their phone number(s)? Charan King- [phone]   Readmission Within the Last 30 Days no previous admission in last 30 days   Patient currently being followed by outpatient case management? No   Patient currently receives any other outside agency services? No   Equipment Currently Used at Home rollator   Do you have any problems affording any of your prescribed medications? No   Is the patient taking medications as prescribed? yes   Does the patient have transportation home? Yes   Transportation Anticipated family or friend will provide   Dialysis Name and Scheduled days [facility] M-W-F   Does the patient receive services at the [facility]? No   Discharge Plan B Home with family;Home Health   DME Needed Upon Discharge  none   Patient/Family in Agreement with Plan yes   Does the patient have transportation to healthcare appointments? Yes     Right toe amputation-10/15/20.   Additional DME TBD  Will continue to monitor for DC  HH /WC/ needs. And place appropriate referrals.

## 2020-10-15 NOTE — NURSING TRANSFER
Nursing Transfer Note      10/15/2020     Transfer To: 643    Transfer via stretcher    Transfer with n/a    Transported by transport    Medicines sent: n/a    Chart send with patient: Yes    Notified:     Patient reassessed at: 10/15/2020    Upon arrival to floor:

## 2020-10-15 NOTE — ASSESSMENT & PLAN NOTE
Ms. King is a 63 yo F with T2DM, ESRD on HD (MWF), HFpEF (EF 50-55%), HTN, HLD, COPD, T2DM, chronic venous insufficiency, and anxiety/depression that presents for worsening R foot wound. She has multiple admission recently, for same complains, treated with course of Vanc and cefepime (stopped 9/24) she is s/p SFA stenting on 09/17/2020 for a completely occluded SFA.     Fever, worsen pain in the right toe, foul smell. likely gangrene of the right foot.   She was seen by podiatry on 10/1, wound debridement was done   Elevated inflammatory makers   Foot xray showed multiple fracture and possible superimposed infections     MRI:  1st digit.  posttraumatic or secondary to infection/osteomyelitis.  Visualization is limited. Small fractures of the distal 2nd, 3rd and 4th metatarsals with associated edema could be post-traumatic.    Ddx include gangrene, superimposed infection, osteomyelitis    Lactate normalized    Plan:     - Holding abx for cultures in OR  - Podiatry consulted appreciate recs, will hold on vascular consult at this time as Podiatry planning for amputation of the right toe  - Follow Blood Cx  - Will hold Plavix tonight in case Podiatry will intervene tomorrow, resume pending recs tomorrow.   - ID for help with antibiotics

## 2020-10-15 NOTE — PLAN OF CARE
POC established and functional mobility goals were created to help pt return to PLOF. Will be reassessed as appropriate to measure pt progress.    Pt will require reassessment and new goals after possible sx for amputation.    Problem: Physical Therapy Goal  Goal: Physical Therapy Goal  Description: Goals to be met by: 11/15/2020    Patient will increase functional independence with mobility by performin.. Gait  x 30 feet with Modified [place] using Rolling Walker.   2.. Ascend/descend 5 stair with bilateral Handrails Modified [place] using Rolling Walker.   3.. Stand for 10 minutes with Modified [place] using Rolling Walker    10/15/2020 1351 by Rayna Muro, PT  Outcome: Ongoing, Progressing

## 2020-10-15 NOTE — PLAN OF CARE
POC established and functional mobility goals were created to help pt return to PLOF. Will be reassessed as appropriate to measure pt progress.    Pt will require reassessment and new goals after possible sx for amputation.

## 2020-10-15 NOTE — TRANSFER OF CARE
"Anesthesia Transfer of Care Note    Patient: Kylie King    Procedure(s) Performed: Procedure(s) (LRB):  PARTIAL RAY AMPUTATION GREAT TOE (Right)    Patient location: PACU    Anesthesia Type: general    Transport from OR: Transported from OR on 6-10 L/min O2 by face mask with adequate spontaneous ventilation    Post pain: adequate analgesia    Post assessment: no apparent anesthetic complications and tolerated procedure well    Post vital signs: stable    Level of consciousness: awake, alert and oriented    Nausea/Vomiting: no nausea/vomiting    Complications: none    Transfer of care protocol was followed      Last vitals: 10/15/2020 1513  Visit Vitals  /70   Pulse 79   Temp 97.9   Resp 24   Ht 5' 4" (1.626 m)   Wt 128.8 kg (284 lb)   SpO2 96%   Breastfeeding No   BMI 48.75 kg/m²     "

## 2020-10-16 LAB
ALBUMIN SERPL BCP-MCNC: 2.8 G/DL (ref 3.5–5.2)
ALP SERPL-CCNC: 67 U/L (ref 55–135)
ALT SERPL W/O P-5'-P-CCNC: 29 U/L (ref 10–44)
ANION GAP SERPL CALC-SCNC: 17 MMOL/L (ref 8–16)
AST SERPL-CCNC: 20 U/L (ref 10–40)
BASOPHILS # BLD AUTO: 0.04 K/UL (ref 0–0.2)
BASOPHILS NFR BLD: 0.4 % (ref 0–1.9)
BILIRUB SERPL-MCNC: 0.4 MG/DL (ref 0.1–1)
BUN SERPL-MCNC: 55 MG/DL (ref 8–23)
CALCIUM SERPL-MCNC: 8.5 MG/DL (ref 8.7–10.5)
CHLORIDE SERPL-SCNC: 97 MMOL/L (ref 95–110)
CO2 SERPL-SCNC: 19 MMOL/L (ref 23–29)
CREAT SERPL-MCNC: 8.7 MG/DL (ref 0.5–1.4)
DIFFERENTIAL METHOD: ABNORMAL
EOSINOPHIL # BLD AUTO: 0.2 K/UL (ref 0–0.5)
EOSINOPHIL NFR BLD: 1.6 % (ref 0–8)
ERYTHROCYTE [DISTWIDTH] IN BLOOD BY AUTOMATED COUNT: 19.7 % (ref 11.5–14.5)
EST. GFR  (AFRICAN AMERICAN): 5 ML/MIN/1.73 M^2
EST. GFR  (NON AFRICAN AMERICAN): 4.3 ML/MIN/1.73 M^2
GLUCOSE SERPL-MCNC: 286 MG/DL (ref 70–110)
HCT VFR BLD AUTO: 33.5 % (ref 37–48.5)
HGB BLD-MCNC: 10 G/DL (ref 12–16)
IMM GRANULOCYTES # BLD AUTO: 0.09 K/UL (ref 0–0.04)
IMM GRANULOCYTES NFR BLD AUTO: 1 % (ref 0–0.5)
LYMPHOCYTES # BLD AUTO: 1.4 K/UL (ref 1–4.8)
LYMPHOCYTES NFR BLD: 15.1 % (ref 18–48)
MAGNESIUM SERPL-MCNC: 2.1 MG/DL (ref 1.6–2.6)
MCH RBC QN AUTO: 27.9 PG (ref 27–31)
MCHC RBC AUTO-ENTMCNC: 29.9 G/DL (ref 32–36)
MCV RBC AUTO: 94 FL (ref 82–98)
MONOCYTES # BLD AUTO: 0.8 K/UL (ref 0.3–1)
MONOCYTES NFR BLD: 8.6 % (ref 4–15)
NEUTROPHILS # BLD AUTO: 6.7 K/UL (ref 1.8–7.7)
NEUTROPHILS NFR BLD: 73.3 % (ref 38–73)
NRBC BLD-RTO: 0 /100 WBC
PHOSPHATE SERPL-MCNC: 7.4 MG/DL (ref 2.7–4.5)
PLATELET # BLD AUTO: 284 K/UL (ref 150–350)
PMV BLD AUTO: 11 FL (ref 9.2–12.9)
POCT GLUCOSE: 145 MG/DL (ref 70–110)
POCT GLUCOSE: 198 MG/DL (ref 70–110)
POCT GLUCOSE: 244 MG/DL (ref 70–110)
POCT GLUCOSE: 279 MG/DL (ref 70–110)
POTASSIUM SERPL-SCNC: 4.5 MMOL/L (ref 3.5–5.1)
PROT SERPL-MCNC: 7 G/DL (ref 6–8.4)
RBC # BLD AUTO: 3.58 M/UL (ref 4–5.4)
SODIUM SERPL-SCNC: 133 MMOL/L (ref 136–145)
VANCOMYCIN SERPL-MCNC: 18.4 UG/ML
WBC # BLD AUTO: 9.18 K/UL (ref 3.9–12.7)

## 2020-10-16 PROCEDURE — 25000003 PHARM REV CODE 250: Performed by: STUDENT IN AN ORGANIZED HEALTH CARE EDUCATION/TRAINING PROGRAM

## 2020-10-16 PROCEDURE — 90935 HEMODIALYSIS ONE EVALUATION: CPT | Mod: ,,, | Performed by: NURSE PRACTITIONER

## 2020-10-16 PROCEDURE — 80100014 HC HEMODIALYSIS 1:1

## 2020-10-16 PROCEDURE — 83735 ASSAY OF MAGNESIUM: CPT

## 2020-10-16 PROCEDURE — 11000001 HC ACUTE MED/SURG PRIVATE ROOM

## 2020-10-16 PROCEDURE — 36415 COLL VENOUS BLD VENIPUNCTURE: CPT

## 2020-10-16 PROCEDURE — 80202 ASSAY OF VANCOMYCIN: CPT

## 2020-10-16 PROCEDURE — 80053 COMPREHEN METABOLIC PANEL: CPT

## 2020-10-16 PROCEDURE — 63600175 PHARM REV CODE 636 W HCPCS: Performed by: ANESTHESIOLOGY

## 2020-10-16 PROCEDURE — 94761 N-INVAS EAR/PLS OXIMETRY MLT: CPT

## 2020-10-16 PROCEDURE — 80100016 HC MAINTENANCE HEMODIALYSIS

## 2020-10-16 PROCEDURE — 63600175 PHARM REV CODE 636 W HCPCS: Performed by: STUDENT IN AN ORGANIZED HEALTH CARE EDUCATION/TRAINING PROGRAM

## 2020-10-16 PROCEDURE — 85025 COMPLETE CBC W/AUTO DIFF WBC: CPT

## 2020-10-16 PROCEDURE — 90935 PR HEMODIALYSIS, ONE EVALUATION: ICD-10-PCS | Mod: ,,, | Performed by: NURSE PRACTITIONER

## 2020-10-16 PROCEDURE — 84100 ASSAY OF PHOSPHORUS: CPT

## 2020-10-16 PROCEDURE — 99223 1ST HOSP IP/OBS HIGH 75: CPT | Mod: ,,, | Performed by: NURSE PRACTITIONER

## 2020-10-16 PROCEDURE — 99223 PR INITIAL HOSPITAL CARE,LEVL III: ICD-10-PCS | Mod: ,,, | Performed by: NURSE PRACTITIONER

## 2020-10-16 RX ORDER — CEFEPIME HYDROCHLORIDE 1 G/1
1 INJECTION, POWDER, FOR SOLUTION INTRAMUSCULAR; INTRAVENOUS
Status: DISCONTINUED | OUTPATIENT
Start: 2020-10-16 | End: 2020-10-17

## 2020-10-16 RX ORDER — HYDROCODONE BITARTRATE AND ACETAMINOPHEN 5; 325 MG/1; MG/1
1 TABLET ORAL EVERY 8 HOURS PRN
Status: DISCONTINUED | OUTPATIENT
Start: 2020-10-16 | End: 2020-10-17 | Stop reason: HOSPADM

## 2020-10-16 RX ORDER — CLOPIDOGREL BISULFATE 75 MG/1
75 TABLET ORAL DAILY
Status: DISCONTINUED | OUTPATIENT
Start: 2020-10-16 | End: 2020-10-17 | Stop reason: HOSPADM

## 2020-10-16 RX ADMIN — LETROZOLE 2.5 MG: 2.5 TABLET ORAL at 08:10

## 2020-10-16 RX ADMIN — INSULIN ASPART 12 UNITS: 100 INJECTION, SOLUTION INTRAVENOUS; SUBCUTANEOUS at 01:10

## 2020-10-16 RX ADMIN — ACETAMINOPHEN 650 MG: 325 TABLET ORAL at 06:10

## 2020-10-16 RX ADMIN — FLUTICASONE PROPIONATE 100 MCG: 50 SPRAY, METERED NASAL at 02:10

## 2020-10-16 RX ADMIN — ATORVASTATIN CALCIUM 40 MG: 20 TABLET, FILM COATED ORAL at 02:10

## 2020-10-16 RX ADMIN — HYDROCODONE BITARTRATE AND ACETAMINOPHEN 1 TABLET: 5; 325 TABLET ORAL at 08:10

## 2020-10-16 RX ADMIN — INSULIN DETEMIR 18 UNITS: 100 INJECTION, SOLUTION SUBCUTANEOUS at 01:10

## 2020-10-16 RX ADMIN — INSULIN ASPART 2 UNITS: 100 INJECTION, SOLUTION INTRAVENOUS; SUBCUTANEOUS at 01:10

## 2020-10-16 RX ADMIN — HYDROMORPHONE HYDROCHLORIDE 0.2 MG: 1 INJECTION, SOLUTION INTRAMUSCULAR; INTRAVENOUS; SUBCUTANEOUS at 06:10

## 2020-10-16 RX ADMIN — CLOPIDOGREL 75 MG: 75 TABLET, FILM COATED ORAL at 01:10

## 2020-10-16 RX ADMIN — CARVEDILOL 25 MG: 25 TABLET, FILM COATED ORAL at 08:10

## 2020-10-16 RX ADMIN — INSULIN ASPART 12 UNITS: 100 INJECTION, SOLUTION INTRAVENOUS; SUBCUTANEOUS at 05:10

## 2020-10-16 RX ADMIN — CARVEDILOL 25 MG: 25 TABLET, FILM COATED ORAL at 01:10

## 2020-10-16 RX ADMIN — AMLODIPINE BESYLATE 10 MG: 10 TABLET ORAL at 08:10

## 2020-10-16 RX ADMIN — HYDRALAZINE HYDROCHLORIDE 25 MG: 25 TABLET, FILM COATED ORAL at 01:10

## 2020-10-16 RX ADMIN — HYDRALAZINE HYDROCHLORIDE 25 MG: 25 TABLET, FILM COATED ORAL at 08:10

## 2020-10-16 RX ADMIN — GABAPENTIN 400 MG: 400 CAPSULE ORAL at 08:10

## 2020-10-16 RX ADMIN — SEVELAMER CARBONATE 800 MG: 800 TABLET, FILM COATED ORAL at 06:10

## 2020-10-16 RX ADMIN — SEVELAMER CARBONATE 800 MG: 800 TABLET, FILM COATED ORAL at 01:10

## 2020-10-16 RX ADMIN — INSULIN ASPART 3 UNITS: 100 INJECTION, SOLUTION INTRAVENOUS; SUBCUTANEOUS at 06:10

## 2020-10-16 RX ADMIN — ASPIRIN 81 MG: 81 TABLET, COATED ORAL at 01:10

## 2020-10-16 RX ADMIN — CITALOPRAM HYDROBROMIDE 20 MG: 20 TABLET ORAL at 08:10

## 2020-10-16 RX ADMIN — INSULIN DETEMIR 18 UNITS: 100 INJECTION, SOLUTION SUBCUTANEOUS at 08:10

## 2020-10-16 NOTE — CONSULTS
Inpatient Radiology Consult Note    History of Present Illness:  Kylie King is a 65 y.o. female on dialysis needing long term antibiotics. IR consulted for tunneled PICC placement for 6 weeks of IV antibiotics.    Discussed with Dr. Garcia from IR    Discussed with Dr. Lopez from primary medicine team.    Admission H&P reviewed.    Past Medical History:   Diagnosis Date    Anxiety     Arthritis     DDD, Lumbar    Breast cancer 1/2013    left breast- invasive mammary carcinoma, ER/WV positive, Her2 negative    Carotid artery stenosis 8/13/2018 6/22/2018: Carotid Duplex: SHANELL: Moderate plaquing - 2.0 m/s - <50%. LICA: Moderate plaquing - 1.6 m/s - <50%.    Cataract     Choledocholithiasis     s/p ERCP and stent placement    Chronic obstructive pulmonary disease 6/8/2018    Chronic venous insufficiency     Colon cancer 2001    CRI (chronic renal insufficiency)     one kidney    Dialysis AV fistula malfunction     ESRD (end stage renal disease) on dialysis     Former smoker 6/8/2018    GERD (gastroesophageal reflux disease)     History of acute pancreatitis 2009 2/09 s/p sphincterotomy    History of cerebrovascular accident 6/8/2018    History of colon cancer     s/p sigmoid colectomy    HTN (hypertension), benign     Hypercholesterolemia     Hyperlipidemia     Hypoxemia 6/8/2018    Intracerebral hemorrhage     Kidney stone     left    Lymphedema of both lower extremities     Obesity     Pancreatitis     Pancreatitis 2008    Physical deconditioning     Pulmonary edema     Sacroiliac joint pain     Spinal stenosis     Spinal stenosis, lumbar     Stroke 12/2012    Tobacco abuse     Type 2 diabetes mellitus with neurological manifestations, controlled     Neuropathy     Past Surgical History:   Procedure Laterality Date    ANGIOGRAPHY OF LOWER EXTREMITY Right 9/17/2020    Procedure: Angiogram Extremity Unilateral;  Surgeon: RICK Pollard III, MD;  Location: [facility];  Service: Peripheral Vascular;  Laterality: Right;  6.2 minutes of fluro  690.88 mGy  112.64 Gycm2  55 ml    AV FISTULA PLACEMENT Right 5/28/2018    Procedure: CREATION -FISTULA-AV;  Surgeon: RICK Pollard III, MD;  [facility];  Service: Peripheral Vascular;  Laterality: Right;    BREAST BIOPSY  1/2012    left breast invasive mammary carcinoma (lateral bx) and intraductal papilloma (medial bx)    BREAST BIOPSY  1999    rt breast FA    CATARACT EXTRACTION W/  INTRAOCULAR LENS IMPLANT Right 1/24/2019        CATARACT EXTRACTION W/  INTRAOCULAR LENS IMPLANT Left 1/31/2019    Procedure: EXTRACTION, CATARACT, WITH IOL INSERTION;  Surgeon: Immanuel Moncada MD;  Location: [facility];  Service: Ophthalmology;  Laterality: Left;    CHOLECYSTECTOMY  2001    COLON SURGERY      FOOT AMPUTATION THROUGH METATARSAL Right 10/15/2020    Procedure: PARTIAL RAY AMPUTATION GREAT TOE;  Surgeon: Billy Robles DPM;  [facility];  Service: Podiatry;  Laterality: Right;  Stretcher OK    HERNIA REPAIR      left nephrectomy      atrophic kidney and hydronephrosis    left oopherectomy  2001    MASTECTOMY  2013    left    NEPHRECTOMY  2011    lap    REVISION OF ARTERIOVENOUS FISTULA Right 8/15/2018    Procedure: REVISION, AV FISTULA;  Surgeon: RICK Pollard III, MD;  [facility];  Service: Peripheral Vascular;  Laterality: Right;    SIGMOIDECTOMY  2001    TOTAL REDUCTION MAMMOPLASTY Right 2013       Review of Systems:   As documented in primary team H&P    Home Meds:   Prior to Admission medications    Medication Sig Start Date End Date Taking? Authorizing Provider   acetaminophen (TYLENOL) 325 MG tablet Take 650 mg by mouth every 6 (six) hours as needed for mild pain 1-3/10 pain scale. 3/16/19  Yes Historical Provider   acetaminophen-codeine 300-30mg (TYLENOL #3) 300-30 mg Tab Take 1 tablet by mouth every 6 (six) hours as needed. 10/8/20  Yes Virginia Foote MD    albuterol (PROVENTIL/VENTOLIN HFA) 90 mcg/actuation inhaler Inhale 2 puffs into the lungs every 6 (six) hours as needed for Wheezing. Rescue 6/25/20 6/25/21 Yes Virginia Foote MD   amLODIPine (NORVASC) 10 MG tablet Take 1 tablet (10 mg total) by mouth every morning.  Patient taking differently: Take 10 mg by mouth every evening.  3/19/20  Yes Virginia Foote MD   ammonium lactate 12 % Crea Apply twice daily to affected parts both feet as needed. 8/4/20  Yes Billy Robles DPM   aspirin (ECOTRIN) 81 MG EC tablet Take 1 tablet (81 mg total) by mouth once daily. 9/17/20 9/17/21 Yes Jose Andersen MD   atorvastatin (LIPITOR) 40 MG tablet Take 1 tablet (40 mg total) by mouth once daily. 3/19/20  Yes Virginia Foote MD   carvediloL (COREG) 25 MG tablet Take 1 tablet (25 mg total) by mouth 2 (two) times daily. 3/19/20 3/19/21 Yes Virginia Foote MD   citalopram (CELEXA) 20 MG tablet Take 1 tablet (20 mg total) by mouth nightly. 4/7/20  Yes Virginia Foote MD   clopidogreL (PLAVIX) 75 mg tablet Take 1 tablet (75 mg total) by mouth once daily. 9/17/20 9/17/21 Yes Jose Andersen MD   diclofenac sodium (VOLTAREN) 1 % Gel Apply 2 grams topically 4 (four) times daily. 6/25/20  Yes Virginia Foote MD   famotidine (PEPCID) 20 MG tablet Take 1 tablet (20 mg total) by mouth nightly as needed. 3/19/20 3/19/21 Yes Virginia Foote MD   fish oil-omega-3 fatty acids 300-1,000 mg capsule Take 1 capsule by mouth every morning.   Yes Historical Provider   fluticasone (FLONASE) 50 mcg/actuation nasal spray 2 sprays (100 mcg total) by Each Nare route once daily.  Patient taking differently: 2 sprays by Each Nostril route daily as needed for Rhinitis or Allergies.  11/6/18  Yes CECILIA Alba   gabapentin (NEURONTIN) 400 MG capsule Take 1 capsule (400 mg total) by mouth every evening. 5/18/20  Yes Virginia Foote MD   honey ([facility], HONEY,) 80 % Gel Apply 1 application topically 3 (three) times  "daily. 10/8/20  Yes Virginia Foote MD   hydrALAZINE (APRESOLINE) 25 MG tablet Take 1 tablet (25 mg total) by mouth 2 (two) times daily. 6/25/20 6/25/21 Yes Virginia Foote MD   insulin detemir U-100 (LEVEMIR) 100 unit/mL injection Inject 45 Units into the skin 2 (two) times daily.   Yes Historical Provider   insulin lispro (HUMALOG KWIKPEN INSULIN) 100 unit/mL pen BLOOD GLUCOSE 150-200, INJECT 1 UNITS UNDER THE SKIN, 201-250, 2 UNITS, 251-300, 3 UNITS THREE TIMES A DAY AS DIRECTED  Patient taking differently: Inject 15-20 Units into the skin 3 (three) times daily. AS DIRECTED 5/29/19  Yes Virginia Foote MD   ketoconazole (NIZORAL) 2 % cream Apply topically once daily. 3/5/20  Yes Billy Robles DPM   letrozole (FEMARA) 2.5 mg Tab Take 1 tablet (2.5 mg total) by mouth nightly. 3/19/20  Yes Virginia Foote MD   BD INSULIN PEN NEEDLE UF MINI 31 gauge x 3/16" Ndle USE 5 NEEDLES PER DAY 1/26/17   Virginia Foote MD   insulin lispro (HUMALOG KWIKPEN INSULIN) 100 unit/mL pen BLOOD GLUCOSE 150-200, INJECT 1 UNITS UNDER THE SKIN, 201-250, 2 UNITS, 251-300, 3 UNITS THREE TIMES A DAY AS DIRECTED 10/11/18   Historical Provider   insulin needles, disposable, (PEN NEEDLE, DIABETIC) 31 Ndle Patient needs 5 needles a day 1/6/16   Virginia Foote MD   lancets (ONETOUCH DELICA LANCETS) 33 gauge Misc 1 lancet by Misc.(Non-Drug; Combo Route) route 4 (four) times daily before meals and nightly. 1/6/16   Virginia Foote MD   pen needle, diabetic 32 gauge x 5/32" Ndle 1 Device by Misc.(Non-Drug; Combo Route) route 5 (five) times daily. 10/11/18   Virginia Foote MD     Scheduled Meds:    amLODIPine  10 mg Oral QHS    aspirin  81 mg Oral Daily    atorvastatin  40 mg Oral Daily    carvediloL  25 mg Oral BID    ceFEPime (MAXIPIME) IVPB  1 g Intravenous Q24H    citalopram  20 mg Oral Nightly    clopidogreL  75 mg Oral Daily    electrolyte-S (pH 7.4)  250 mL Intravenous Once    fluticasone propionate  2 " spray Each Nostril Daily    gabapentin  400 mg Oral QHS    hydrALAZINE  25 mg Oral BID    insulin aspart U-100  12 Units Subcutaneous TIDWM    insulin detemir U-100  18 Units Subcutaneous BID    letrozole  2.5 mg Oral Nightly    sevelamer carbonate  800 mg Oral TID WM    vancomycin (VANCOCIN) IVPB  750 mg Intravenous Once     Continuous Infusions:   PRN Meds:acetaminophen, dextrose 50%, dextrose 50%, fentaNYL, glucagon (human recombinant), glucose, glucose, HYDROmorphone, insulin aspart U-100, ondansetron, ondansetron, sodium chloride 0.9%, sodium chloride 0.9%, sodium chloride 0.9%, Pharmacy to dose Vancomycin consult **AND** vancomycin - pharmacy to dose  Anticoagulants/Antiplatelets: aspirin, plavix    Allergies:   Review of patient's allergies indicates:   Allergen Reactions    Cyclobenzaprine Hallucinations    Erythromycin      Stomach upset       Labs:  Recent Labs   Lab 10/14/20  0249   INR 1.0       Recent Labs   Lab 10/16/20  0409   WBC 9.18   HGB 10.0*   HCT 33.5*   MCV 94         Recent Labs   Lab 10/16/20  0409   *   *   K 4.5   CL 97   CO2 19*   BUN 55*   CREATININE 8.7*   CALCIUM 8.5*   MG 2.1   ALT 29   AST 20   ALBUMIN 2.8*   BILITOT 0.4         Vitals:  Temp: 100.3 °F (37.9 °C) (10/16/20 1626)  Pulse: 84 (10/16/20 1626)  Resp: 18 (10/16/20 1626)  BP: (!) 165/85 (10/16/20 1626)  SpO2: (!) 93 % (10/16/20 1626)       Plan:  Sedation Plan: up to moderate  Patient will undergo tunneled PICC on Monday 10/19/20, please keep patient NPO and hold anticoagulation prior to procedure.      Joe Barry MD  PGY-II, Radiology

## 2020-10-16 NOTE — NURSING
Pt O2 84-90% on 13L nonrebreather. Bumped pt back up to 15 L nonrebreather, pt 90% on 15 L. Will continue to monitor.

## 2020-10-16 NOTE — SUBJECTIVE & OBJECTIVE
Past Medical History:   Diagnosis Date    Anxiety     Arthritis     DDD, Lumbar    Breast cancer 1/2013    left breast- invasive mammary carcinoma, ER/NE positive, Her2 negative    Carotid artery stenosis 8/13/2018 6/22/2018: Carotid Duplex: SHANELL: Moderate plaquing - 2.0 m/s - <50%. LICA: Moderate plaquing - 1.6 m/s - <50%.    Cataract     Choledocholithiasis     s/p ERCP and stent placement    Chronic obstructive pulmonary disease 6/8/2018    Chronic venous insufficiency     Colon cancer 2001    CRI (chronic renal insufficiency)     one kidney    Dialysis AV fistula malfunction     ESRD (end stage renal disease) on dialysis     Former smoker 6/8/2018    GERD (gastroesophageal reflux disease)     History of acute pancreatitis 2009 2/09 s/p sphincterotomy    History of cerebrovascular accident 6/8/2018    History of colon cancer     s/p sigmoid colectomy    HTN (hypertension), benign     Hypercholesterolemia     Hyperlipidemia     Hypoxemia 6/8/2018    Intracerebral hemorrhage     Kidney stone     left    Lymphedema of both lower extremities     Obesity     Pancreatitis     Pancreatitis 2008    Physical deconditioning     Pulmonary edema     Sacroiliac joint pain     Spinal stenosis     Spinal stenosis, lumbar     Stroke 12/2012    Tobacco abuse     Type 2 diabetes mellitus with neurological manifestations, controlled     Neuropathy       Past Surgical History:   Procedure Laterality Date    ANGIOGRAPHY OF LOWER EXTREMITY Right 9/17/2020    Procedure: Angiogram Extremity Unilateral;  Surgeon: RICK Pollard III, MD;  Location: [facility] OR [address] [place] [address];  Service: Peripheral Vascular;  Laterality: Right;  6.2 minutes of fluro  690.88 mGy  112.64 Gycm2  55 ml    AV FISTULA PLACEMENT Right 5/28/2018    Procedure: CREATION -FISTULA-AV;  Surgeon: RICK Pollard III, MD;  Location: [facility] OR [address] [place] [address];  Service: Peripheral Vascular;  Laterality: Right;    BREAST BIOPSY  1/2012    left breast  invasive mammary carcinoma (lateral bx) and intraductal papilloma (medial bx)    BREAST BIOPSY  1999    rt breast FA    CATARACT EXTRACTION W/  INTRAOCULAR LENS IMPLANT Right 1/24/2019        CATARACT EXTRACTION W/  INTRAOCULAR LENS IMPLANT Left 1/31/2019    Procedure: EXTRACTION, CATARACT, WITH IOL INSERTION;  Surgeon: Immanuel Moncada MD;  Location: [facility];  Service: Ophthalmology;  Laterality: Left;    CHOLECYSTECTOMY  2001    COLON SURGERY      FOOT AMPUTATION THROUGH METATARSAL Right 10/15/2020    Procedure: PARTIAL RAY AMPUTATION GREAT TOE;  Surgeon: Billy Robles DPM;  Location: [address];  Service: Podiatry;  Laterality: Right;  Stretcher OK    HERNIA REPAIR      left nephrectomy      atrophic kidney and hydronephrosis    left oopherectomy  2001    MASTECTOMY  2013    left    NEPHRECTOMY  2011    lap    REVISION OF ARTERIOVENOUS FISTULA Right 8/15/2018    Procedure: REVISION, AV FISTULA;  Surgeon: RICK Pollard III, MD;  Location: [facility] OR [address] [place] [address];  Service: Peripheral Vascular;  Laterality: Right;    SIGMOIDECTOMY  2001    TOTAL REDUCTION MAMMOPLASTY Right 2013       Review of patient's allergies indicates:   Allergen Reactions    Cyclobenzaprine Hallucinations    Erythromycin      Stomach upset       Medications:  Medications Prior to Admission   Medication Sig    acetaminophen (TYLENOL) 325 MG tablet Take 650 mg by mouth every 6 (six) hours as needed for mild pain 1-3/10 pain scale.    acetaminophen-codeine 300-30mg (TYLENOL #3) 300-30 mg Tab Take 1 tablet by mouth every 6 (six) hours as needed.    albuterol (PROVENTIL/VENTOLIN HFA) 90 mcg/actuation inhaler Inhale 2 puffs into the lungs every 6 (six) hours as needed for Wheezing. Rescue    amLODIPine (NORVASC) 10 MG tablet Take 1 tablet (10 mg total) by mouth every morning. (Patient taking differently: Take 10 mg by mouth every evening. )    ammonium lactate 12 % Crea Apply twice daily to affected parts both feet as  "needed.    aspirin (ECOTRIN) 81 MG EC tablet Take 1 tablet (81 mg total) by mouth once daily.    atorvastatin (LIPITOR) 40 MG tablet Take 1 tablet (40 mg total) by mouth once daily.    carvediloL (COREG) 25 MG tablet Take 1 tablet (25 mg total) by mouth 2 (two) times daily.    citalopram (CELEXA) 20 MG tablet Take 1 tablet (20 mg total) by mouth nightly.    clopidogreL (PLAVIX) 75 mg tablet Take 1 tablet (75 mg total) by mouth once daily.    diclofenac sodium (VOLTAREN) 1 % Gel Apply 2 grams topically 4 (four) times daily.    famotidine (PEPCID) 20 MG tablet Take 1 tablet (20 mg total) by mouth nightly as needed.    fish oil-omega-3 fatty acids 300-1,000 mg capsule Take 1 capsule by mouth every morning.    fluticasone (FLONASE) 50 mcg/actuation nasal spray 2 sprays (100 mcg total) by Each Nare route once daily. (Patient taking differently: 2 sprays by Each Nostril route daily as needed for Rhinitis or Allergies. )    gabapentin (NEURONTIN) 400 MG capsule Take 1 capsule (400 mg total) by mouth every evening.    honey (MEDIHONEY, HONEY,) 80 % Gel Apply 1 application topically 3 (three) times daily.    hydrALAZINE (APRESOLINE) 25 MG tablet Take 1 tablet (25 mg total) by mouth 2 (two) times daily.    insulin detemir U-100 (LEVEMIR) 100 unit/mL injection Inject 45 Units into the skin 2 (two) times daily.    insulin lispro (HUMALOG KWIKPEN INSULIN) 100 unit/mL pen BLOOD GLUCOSE 150-200, INJECT 1 UNITS UNDER THE SKIN, 201-250, 2 UNITS, 251-300, 3 UNITS THREE TIMES A DAY AS DIRECTED (Patient taking differently: Inject 15-20 Units into the skin 3 (three) times daily. AS DIRECTED)    ketoconazole (NIZORAL) 2 % cream Apply topically once daily.    letrozole (FEMARA) 2.5 mg Tab Take 1 tablet (2.5 mg total) by mouth nightly.    BD INSULIN PEN NEEDLE UF MINI 31 gauge x 3/16" Ndle USE 5 NEEDLES PER DAY    insulin lispro (HUMALOG KWIKPEN INSULIN) 100 unit/mL pen BLOOD GLUCOSE 150-200, INJECT 1 UNITS UNDER THE " "SKIN, 201-250, 2 UNITS, 251-300, 3 UNITS THREE TIMES A DAY AS DIRECTED    insulin needles, disposable, (PEN NEEDLE, DIABETIC) 31 Ndle Patient needs 5 needles a day    lancets (ONETOUCH DELICA LANCETS) 33 gauge Misc 1 lancet by Misc.(Non-Drug; Combo Route) route 4 (four) times daily before meals and nightly.    pen needle, diabetic 32 gauge x 5/32" Ndle 1 Device by Misc.(Non-Drug; Combo Route) route 5 (five) times daily.     Antibiotics (From admission, onward)    None        Antifungals (From admission, onward)    None        Antivirals (From admission, onward)    None           Immunization History   Administered Date(s) Administered    Hepatitis B, Adult 05/24/2018, 06/23/2018, 07/21/2018, 11/24/2018, 01/31/2020, 02/26/2020, 03/25/2020, 07/31/2020    Influenza (Flumist) - Quadrivalent - Intranasal *Preferred* (2-49 years old) 09/30/2014, 11/10/2016, 12/08/2017    Influenza - Quadrivalent 09/30/2014, 09/16/2019    Influenza - Quadrivalent - PF *Preferred* (6 months and older) 11/10/2016, 12/08/2017    Influenza - Trivalent - PF (ADULT) 12/24/2012    PPD Test 04/20/2018    Pneumococcal Conjugate - 13 Valent 11/12/2016    Pneumococcal Polysaccharide - 23 Valent 01/13/2011    Tdap 10/13/2020       Family History     Problem Relation (Age of Onset)    Arthritis Mother    Breast cancer Maternal Grandmother    Cancer Maternal Grandmother, Maternal Aunt    Celiac disease Sister    Diabetes Father    Heart disease Mother, Father, Maternal Grandmother        Social History     Socioeconomic History    Marital status: Single     Spouse name: Not on file    Number of children: 2    Years of education: Not on file    Highest education level: Not on file   Occupational History    Occupation: not working     Employer: [email or web address]   Social Needs    Financial resource strain: Somewhat hard    Food insecurity     Worry: Never true     Inability: Never true    Transportation needs     Medical: Yes     " Non-medical: Yes   Tobacco Use    Smoking status: Current Some Day Smoker     Packs/day: 0.50     Years: 28.00     Pack years: 14.00     Types: Cigarettes     Start date: 1990     Last attempt to quit: 2018     Years since quittin.7    Smokeless tobacco: Never Used    Tobacco comment: anxious   Substance and Sexual Activity    Alcohol use: No     Frequency: Never     Drinks per session: Patient refused     Binge frequency: Never    Drug use: No    Sexual activity: Never     Partners: Male   Lifestyle    Physical activity     Days per week: 0 days     Minutes per session: Not on file    Stress: Only a little   Relationships    Social connections     Talks on phone: More than three times a week     Gets together: Once a week     Attends Muslim service: Not on file     Active member of club or organization: No     Attends meetings of clubs or organizations: Never     Relationship status:    Other Topics Concern    Not on file   Social History Narrative    She is not exercising regularly     Review of Systems   Constitutional: Positive for activity change and fatigue. Negative for appetite change, chills, diaphoresis and fever.   HENT: Negative.    Respiratory: Negative for cough, choking, shortness of breath and wheezing.    Cardiovascular: Positive for leg swelling. Negative for chest pain and palpitations.   Gastrointestinal: Positive for diarrhea (yesterday, resolved). Negative for abdominal distention, blood in stool, constipation, nausea and vomiting.   Genitourinary: Negative for dysuria.        HD - still makes small amount urine   Musculoskeletal: Positive for gait problem. Negative for back pain.   Skin: Positive for color change and wound (Right leg ).   Neurological: Negative for dizziness and headaches.   Psychiatric/Behavioral: Negative for agitation and confusion.     Objective:     Vital Signs (Most Recent):  Temp: 98.3 °F (36.8 °C) (10/16/20 1057)  Pulse: 91 (10/16/20  1300)  Resp: 18 (10/16/20 1057)  BP: (!) 192/98 (10/16/20 1300)  SpO2: (!) 90 % (10/16/20 0720) Vital Signs (24h Range):  Temp:  [97.6 °F (36.4 °C)-99.3 °F (37.4 °C)] 98.3 °F (36.8 °C)  Pulse:  [71-91] 91  Resp:  [16-20] 18  SpO2:  [88 %-97 %] 90 %  BP: ()/(63-98) 192/98     Weight: 128.8 kg (284 lb)  Body mass index is 48.75 kg/m².    Estimated Creatinine Clearance: 8.5 mL/min (A) (based on SCr of 8.7 mg/dL (H)).    Physical Exam  Vitals signs and nursing note reviewed.   Constitutional:       General: She is not in acute distress.     Appearance: She is obese. She is not ill-appearing, toxic-appearing or diaphoretic.   HENT:      Head: Normocephalic and atraumatic.      Mouth/Throat:      Mouth: Mucous membranes are moist.   Eyes:      General: No scleral icterus.     Conjunctiva/sclera: Conjunctivae normal.   Cardiovascular:      Rate and Rhythm: Normal rate and regular rhythm.   Pulmonary:      Effort: Pulmonary effort is normal. No respiratory distress.      Breath sounds: No wheezing or rales.   Abdominal:      General: Bowel sounds are normal. There is no distension.      Palpations: Abdomen is soft.      Tenderness: There is no abdominal tenderness.   Musculoskeletal: Normal range of motion.         General: No deformity.      Right lower leg: Edema present.      Left lower leg: No edema.      Comments: Right foot with surgical dressing.  Warmth and erythema above dressing.  Venous stasis changes to RLE    See pre-operative photos below.     Skin:     General: Skin is warm and dry.      Comments: Right lower extremity: Diffuse skin changes int he big toes, black in color, strong smell. Picture in the media. Venous stasis also noted in RLE     Neurological:      General: No focal deficit present.      Mental Status: She is alert and oriented to person, place, and time.   Psychiatric:         Mood and Affect: Mood normal.         Behavior: Behavior normal.       Pre-operative  photos            Significant Labs:   Blood Culture:   Recent Labs   Lab 09/03/20  2243 10/13/20  1708 10/13/20  1716   LABBLOO No growth after 5 days.  No growth after 5 days. No Growth to date  No Growth to date  No Growth to date No Growth to date  No Growth to date  No Growth to date     CBC:   Recent Labs   Lab 10/15/20  0426 10/16/20  0409   WBC 7.80 9.18   HGB 9.7* 10.0*   HCT 32.5* 33.5*    284     CMP:   Recent Labs   Lab 10/15/20  0426 10/16/20  0409   * 133*   K 4.5 4.5   CL 97 97   CO2 21* 19*   * 286*   BUN 43* 55*   CREATININE 6.8* 8.7*   CALCIUM 8.9 8.5*   PROT 7.0 7.0   ALBUMIN 2.8* 2.8*   BILITOT 0.4 0.4   ALKPHOS 71 67   AST 21 20   ALT 28 29   ANIONGAP 15 17*   EGFRNONAA 5.8* 4.3*     Wound Culture:   Recent Labs   Lab 10/15/20  1516   LABAERO No growth       Significant Imaging: I have reviewed all pertinent imaging results/findings within the past 24 hours.

## 2020-10-16 NOTE — CONSULTS
[facility]  Infectious Disease  Consult Note    Patient Name: Kylie King  MRN: [id number]  Admission Date: 10/13/2020  Hospital Length of Stay: 3 days  Attending Physician: Gilles Hogue MD  Primary Care Provider: Virginia Foote MD     Isolation Status: Special Contact        Inpatient consult to Infectious Diseases  Consult performed by: ISSAC Preston, ANP  Consult ordered by: Trent Lopez MD  Reason for consult: toe amputation today for chronic osteo, should we start antibiotics now or wait until speciation - will have bone biopsy in OR today        ID Consult acknowledged.   Full consult and recommendations to follow today  In the interim, please call for any immediate concerns.     Thank you.   ISSAC Schwartz, ANP-C  [phone] pager,  [phone]

## 2020-10-16 NOTE — SUBJECTIVE & OBJECTIVE
Interval history:  10/16/2020  No acute events overnight.  Patient feeling better today after dialysis.      Scheduled Meds:   amLODIPine  10 mg Oral QHS    aspirin  81 mg Oral Daily    atorvastatin  40 mg Oral Daily    carvediloL  25 mg Oral BID    citalopram  20 mg Oral Nightly    clopidogreL  75 mg Oral Daily    electrolyte-S (pH 7.4)  250 mL Intravenous Once    fluticasone propionate  2 spray Each Nostril Daily    gabapentin  400 mg Oral QHS    hydrALAZINE  25 mg Oral BID    insulin aspart U-100  12 Units Subcutaneous TIDWM    insulin detemir U-100  18 Units Subcutaneous BID    letrozole  2.5 mg Oral Nightly    sevelamer carbonate  800 mg Oral TID WM     Continuous Infusions:  PRN Meds:acetaminophen, dextrose 50%, dextrose 50%, fentaNYL, glucagon (human recombinant), glucose, glucose, HYDROmorphone, insulin aspart U-100, ondansetron, ondansetron, sodium chloride 0.9%, sodium chloride 0.9%, sodium chloride 0.9%    Review of patient's allergies indicates:   Allergen Reactions    Cyclobenzaprine Hallucinations    Erythromycin      Stomach upset        Past Medical History:   Diagnosis Date    Anxiety     Arthritis     DDD, Lumbar    Breast cancer 1/2013    left breast- invasive mammary carcinoma, ER/OH positive, Her2 negative    Carotid artery stenosis 8/13/2018 6/22/2018: Carotid Duplex: SHANELL: Moderate plaquing - 2.0 m/s - <50%. LICA: Moderate plaquing - 1.6 m/s - <50%.    Cataract     Choledocholithiasis     s/p ERCP and stent placement    Chronic obstructive pulmonary disease 6/8/2018    Chronic venous insufficiency     Colon cancer 2001    CRI (chronic renal insufficiency)     one kidney    Dialysis AV fistula malfunction     ESRD (end stage renal disease) on dialysis     Former smoker 6/8/2018    GERD (gastroesophageal reflux disease)     History of acute pancreatitis 2009 2/09 s/p sphincterotomy    History of cerebrovascular accident 6/8/2018    History of colon cancer      s/p sigmoid colectomy    HTN (hypertension), benign     Hypercholesterolemia     Hyperlipidemia     Hypoxemia 6/8/2018    Intracerebral hemorrhage     Kidney stone     left    Lymphedema of both lower extremities     Obesity     Pancreatitis     Pancreatitis 2008    Physical deconditioning     Pulmonary edema     Sacroiliac joint pain     Spinal stenosis     Spinal stenosis, lumbar     Stroke 12/2012    Tobacco abuse     Type 2 diabetes mellitus with neurological manifestations, controlled     Neuropathy     Past Surgical History:   Procedure Laterality Date    ANGIOGRAPHY OF LOWER EXTREMITY Right 9/17/2020    Procedure: Angiogram Extremity Unilateral;  Surgeon: RICK Pollard III, MD;  Location: [facility] OR [address] [place] [address];  Service: Peripheral Vascular;  Laterality: Right;  6.2 minutes of fluro  690.88 mGy  112.64 Gycm2  55 ml    AV FISTULA PLACEMENT Right 5/28/2018    Procedure: CREATION -FISTULA-AV;  Surgeon: RICK Pollard III, MD;  Location: [facility] OR [address] [place] [address];  Service: Peripheral Vascular;  Laterality: Right;    BREAST BIOPSY  1/2012    left breast invasive mammary carcinoma (lateral bx) and intraductal papilloma (medial bx)    BREAST BIOPSY  1999    rt breast FA    CATARACT EXTRACTION W/  INTRAOCULAR LENS IMPLANT Right 1/24/2019        CATARACT EXTRACTION W/  INTRAOCULAR LENS IMPLANT Left 1/31/2019    Procedure: EXTRACTION, CATARACT, WITH IOL INSERTION;  Surgeon: Immanuel Moncada MD;  Location: [facility];  Service: Ophthalmology;  Laterality: Left;    CHOLECYSTECTOMY  2001    COLON SURGERY      FOOT AMPUTATION THROUGH METATARSAL Right 10/15/2020    Procedure: PARTIAL RAY AMPUTATION GREAT TOE;  Surgeon: Billy Robles DPM;  Location: [facility] OR [address] [place] [address];  Service: Podiatry;  Laterality: Right;  Stretcher OK    HERNIA REPAIR      left nephrectomy      atrophic kidney and hydronephrosis    left oopherectomy  2001    MASTECTOMY  2013    left    NEPHRECTOMY  2011    lap    REVISION OF  ARTERIOVENOUS FISTULA Right 8/15/2018    Procedure: REVISION, AV FISTULA;  Surgeon: RICK Pollard III, MD;  [facility];  Service: Peripheral Vascular;  Laterality: Right;    SIGMOIDECTOMY      TOTAL REDUCTION MAMMOPLASTY Right 2013       Family History     Problem Relation (Age of Onset)    Arthritis Mother    Breast cancer Maternal Grandmother    Cancer Maternal Grandmother, Maternal Aunt    Celiac disease Sister    Diabetes Father    Heart disease Mother, Father, Maternal Grandmother        Tobacco Use    Smoking status: Current Some Day Smoker     Packs/day: 0.50     Years: 28.00     Pack years: 14.00     Types: Cigarettes     Start date: 1990     Last attempt to quit: 2018     Years since quittin.7    Smokeless tobacco: Never Used    Tobacco comment: anxious   Substance and Sexual Activity    Alcohol use: No     Frequency: Never     Drinks per session: Patient refused     Binge frequency: Never    Drug use: No    Sexual activity: Never     Partners: Male     Review of Systems   Constitutional: Positive for fatigue. Negative for chills and fever.   Cardiovascular: Positive for leg swelling.   Gastrointestinal: Negative for nausea and vomiting.   Musculoskeletal: Positive for gait problem.   Skin: Positive for color change and wound.     Objective:     Vital Signs (Most Recent):  Temp: 98.3 °F (36.8 °C) (10/16/20 1057)  Pulse: 91 (10/16/20 1300)  Resp: 18 (10/16/20 1057)  BP: (!) 192/98 (10/16/20 1300)  SpO2: (!) 90 % (10/16/20 0720) Vital Signs (24h Range):  Temp:  [97.6 °F (36.4 °C)-99.3 °F (37.4 °C)] 98.3 °F (36.8 °C)  Pulse:  [71-91] 91  Resp:  [16-20] 18  SpO2:  [88 %-97 %] 90 %  BP: ()/(63-98) 192/98     Weight: 128.8 kg (284 lb)  Body mass index is 48.75 kg/m².    Foot Exam    Right Foot/Ankle     Inspection and Palpation  Ecchymosis: none  Tenderness: none   Swelling: none   Skin Exam: drainage, cellulitis, abnormal color and ulcer;     Neurovascular  Dorsalis  pedis: 1+  Posterior tibial: 1+  Saphenous nerve sensation: absent  Tibial nerve sensation: absent  Superficial peroneal nerve sensation: absent  Deep peroneal nerve sensation: absent  Sural nerve sensation: absent      Left Foot/Ankle      Inspection and Palpation  Ecchymosis: none  Tenderness: none   Swelling: none     Neurovascular  Dorsalis pedis: 1+  Posterior tibial: 1+  Saphenous nerve sensation: diminished  Tibial nerve sensation: diminished  Superficial peroneal nerve sensation: diminished  Deep peroneal nerve sensation: diminished  Sural nerve sensation: diminished            Laboratory:  CBC:   Recent Labs   Lab 10/16/20  0409   WBC 9.18   RBC 3.58*   HGB 10.0*   HCT 33.5*      MCV 94   MCH 27.9   MCHC 29.9*     CMP:   Recent Labs   Lab 10/16/20  0409   *   CALCIUM 8.5*   ALBUMIN 2.8*   PROT 7.0   *   K 4.5   CO2 19*   CL 97   BUN 55*   CREATININE 8.7*   ALKPHOS 67   ALT 29   AST 20   BILITOT 0.4     CRP:   Recent Labs   Lab 10/13/20  1706   .6*     ESR:   Recent Labs   Lab 10/13/20  1706   SEDRATE 85*       Diagnostic Results:  I have reviewed all pertinent imaging results/findings within the past 24 hours.  Imaging Results          MRI Foot (Forefoot) Right Without Contrast (Final result)  Result time 10/13/20 23:30:43    Final result by Steven Addison MD (10/13/20 23:30:43)                 Impression:      Abnormal study involving the proximal and distal phalanx of the 1st digit.  This could be posttraumatic or secondary to infection/osteomyelitis.  Visualization is limited.    Small fractures of the distal 2nd, 3rd and 4th metatarsals with associated edema could be post-traumatic.  Mild edema in the mid 2nd metatarsal also could represent osseous contusion.  Osseous infection is not excluded.    Diffuse edema in the soft tissues of the forefoot could represent cellulitis.  Recommend clinical correlation.    Suboptimal image quality      Electronically signed by: Steven  Donte  Date:    10/13/2020  Time:    23:30             Narrative:    EXAMINATION:  MRI FOOT (FOREFOOT) RIGHT WITHOUT CONTRAST    CLINICAL HISTORY:  Osteomyelitis suspected, diabetic;    TECHNIQUE:  Multiplanar MR imaging through the right forefoot.  Axial sagittal coronal imaging.  T1, stir    COMPARISON:  None    FINDINGS:  There is diminished T1 and increased T2/stir signal within the proximal and distal phalanx of the 1st digit consistent with edema.  Abnormal signal within the mid and distal 2nd metatarsal, distal 3rd metatarsal, distal 4th metatarsal.    There are fractures noted in the distal 2nd, 3rd and 4th metatarsals.    There is soft tissue edema throughout the forefoot.    Image quality is suboptimal with motion artifact.    Edema in the 1st digit could be associated with infection/osteomyelitis.  Recommend clinical correlation and follow-up.    No abscess is identified.                               X-Ray Foot Complete Right (Final result)  Result time 10/13/20 16:47:18    Final result by Lisandro Camacho MD (10/13/20 16:47:18)                 Impression:      1. Fractures as described above, somewhat limited secondary to overlying bandaging material and osteopenia.  Please note, given the appearance of the distal aspect of the proximal phalanx of the great toe, superimposed infection is not excluded and correlation is needed, particularly given the large degree of edema.  Correlation is advised.  Consider follow-up radiography to exclude additional fracture.      Electronically signed by: Lisandro Camacho MD  Date:    10/13/2020  Time:    16:47             Narrative:    EXAMINATION:  XR FOOT COMPLETE 3 VIEW RIGHT    CLINICAL HISTORY:  . Other injury of unspecified body region, initial encounter    TECHNIQUE:  AP, lateral, and oblique views of the right foot were performed.    COMPARISON:  09/03/2020    FINDINGS:  Three views right foot.    There is diffuse osteopenia.  There is impacted fracture  involving the distal aspect of the proximal phalanx of the great toe, fracture planes appear to involve the physeal surface.  There is questionable foreshortening of the proximal phalanx of the 2nd toe, concerning for additional fracture although evaluation is limited.  There is fracture involving the distal aspects of the 2nd, 3rd, and 4th metatarsals.  There is remote fracture involving the 5th metatarsal.  There is diffuse edema about the foot.  No dislocation.  There is edema about the dorsal aspect of the foot.  Degenerative changes are noted of the calcaneus.  There is vascular calcification.                                Clinical Findings:  Right foot:  Patient has gangrenous changes to the distal tuft of the right hallux.  There is drainage and periwound erythema with tissue loss extending proximally.  Also has a small wound at the dorsal aspect of the right 2nd toe that has periwound erythema and edema.  There is exposed bone on the 2nd toe wound.    10/14/2020              10/16/2020  Wound was not visualized.  Dressings are clean dry and intact.

## 2020-10-16 NOTE — PLAN OF CARE
SW will work with patient, family and insurance to achieve HH once orders are placed as patient approaches discharge.       10/16/20 1148   Post-Acute Status   Post-Acute Authorization Home Health   Home Health Status Awaiting Orders for HH   Discharge Plan   Discharge Plan A Home Health     Elsa Maldonado LMSW   - Case Management;

## 2020-10-16 NOTE — PROGRESS NOTES
"[facility] Hospital Medicine  Progress Note    Patient Name: Kylie King  MRN: [id number]  Patient Class: IP- Inpatient   Admission Date: 10/13/2020  Length of Stay: 3 days  Attending Physician: Gilles Hogue MD  Primary Care Provider: Virginia Foote MD    Hospital Medicine Team: [facility] HOSP MED 5 Trent Lopez MD    Subjective:     Principal Problem:Toe infection        HPI:  Ms. King is a 63 yo F with T2DM, ESRD on HD (MWF), HFpEF (EF 50-55%), HTN, HLD, COPD, T2DM, chronic venous insufficiency, and anxiety/depression that presents for worsening R foot wound. She has multiple admission recently, for same complains, treated with course of Vanc and cefepime (stopped 9/24) she is s/p SFA stenting on 09/17/2020  for a completely occluded SFA on aspirin and plavix. She reports for the past 3 weeks her symptoms worsening, her foot was continuing to hurt to the point prompt her the visit the ED. The past few days she has noticed some foul-smelling discharge from the foot and has had fever at home with T-max of 101.8° F. She endorses an associated "burning sensation in that foot.  She has chronic neuropathy but denies any new numbness, weakness or calf swelling.  She was fully dialyzed yesterday. Another ROS negatives. She lives with a roommate. Report compliance with her medications and dialysis. She smokes cigarettes occasionally, denied alcohol drinking or IVDA.      Overview/Hospital Course:  65 year old female with chronic osteomyelitis of the right foot comes in with worsening wound, fevers and increased pain. Podiatry was consulted and they plan for toe amputation today in the OR.    Interval History: NAEON. Patient in dialysis chair resting comfortably. No new complaints/symptoms. Tolerated procedure well  Review of Systems   Constitutional: Positive for activity change and fatigue. Negative for appetite change, chills and fever.   HENT: Negative for congestion, sore throat and " trouble swallowing.    Eyes: Negative for visual disturbance.   Respiratory: Negative for cough, choking, shortness of breath and wheezing.    Cardiovascular: Positive for leg swelling. Negative for chest pain and palpitations.   Gastrointestinal: Negative for abdominal distention, blood in stool, constipation, diarrhea, nausea and vomiting.   Genitourinary: Negative for dysuria.   Musculoskeletal: Positive for gait problem. Negative for back pain.   Skin: Positive for color change and wound (Right leg ).   Neurological: Negative for light-headedness and headaches.   Psychiatric/Behavioral: Negative for agitation.     Objective:     Vital Signs (Most Recent):  Temp: 98.3 °F (36.8 °C) (10/16/20 1057)  Pulse: 87 (10/16/20 1057)  Resp: 18 (10/16/20 1057)  BP: (!) 149/80 (10/16/20 1057)  SpO2: (!) 90 % (10/16/20 0720) Vital Signs (24h Range):  Temp:  [97.6 °F (36.4 °C)-99.3 °F (37.4 °C)] 98.3 °F (36.8 °C)  Pulse:  [70-88] 87  Resp:  [16-20] 18  SpO2:  [88 %-97 %] 90 %  BP: ()/(63-88) 149/80     Weight: 128.8 kg (284 lb)  Body mass index is 48.75 kg/m².    Intake/Output Summary (Last 24 hours) at 10/16/2020 1247  Last data filed at 10/16/2020 1057  Gross per 24 hour   Intake 900 ml   Output 2600 ml   Net -1700 ml      Physical Exam  Constitutional:       General: She is not in acute distress.     Appearance: She is obese. She is not ill-appearing.   HENT:      Head: Normocephalic and atraumatic.      Mouth/Throat:      Mouth: Mucous membranes are moist.      Pharynx: Oropharynx is clear.   Eyes:      Conjunctiva/sclera: Conjunctivae normal.      Pupils: Pupils are equal, round, and reactive to light.   Cardiovascular:      Rate and Rhythm: Normal rate.      Pulses: Normal pulses.   Pulmonary:      Effort: Pulmonary effort is normal. No respiratory distress.      Breath sounds: No wheezing.   Abdominal:      General: Bowel sounds are normal. There is no distension.      Palpations: Abdomen is soft.      Tenderness:  There is no abdominal tenderness.   Musculoskeletal: Normal range of motion.         General: Deformity (RLE) present.      Right lower leg: Edema present.      Left lower leg: No edema.   Skin:     General: Skin is warm and dry.      Comments: Right lower extremity: Diffuse skin changes int he big toes, black in color, strong smell. Picture in the media. Venous stasis also noted in RLE     Neurological:      General: No focal deficit present.      Mental Status: She is alert and oriented to person, place, and time.   Psychiatric:         Mood and Affect: Mood normal.         Behavior: Behavior normal.         Significant Labs:   CBC:   Recent Labs   Lab 10/15/20  0426 10/16/20  0409   WBC 7.80 9.18   HGB 9.7* 10.0*   HCT 32.5* 33.5*    284     CMP:   Recent Labs   Lab 10/15/20  0426 10/16/20  0409   * 133*   K 4.5 4.5   CL 97 97   CO2 21* 19*   * 286*   BUN 43* 55*   CREATININE 6.8* 8.7*   CALCIUM 8.9 8.5*   PROT 7.0 7.0   ALBUMIN 2.8* 2.8*   BILITOT 0.4 0.4   ALKPHOS 71 67   AST 21 20   ALT 28 29   ANIONGAP 15 17*   EGFRNONAA 5.8* 4.3*     Magnesium:   Recent Labs   Lab 10/15/20  0426 10/16/20  0409   MG 2.1 2.1     Recent Labs     10/16/20  0409   PHOS 7.4*         POCT Glucose:   Recent Labs   Lab 10/15/20  1633 10/15/20  2109 10/16/20  0940   POCTGLUCOSE 145* 224* 145*       Significant Imaging: I have reviewed all pertinent imaging results/findings within the past 24 hours.      Assessment/Plan:      * Right toe wound infection   Ms. King is a 63 yo F with T2DM, ESRD on HD (MWF), HFpEF (EF 50-55%), HTN, HLD, COPD, T2DM, chronic venous insufficiency, and anxiety/depression that presents for worsening R foot wound. She has multiple admission recently, for same complains, treated with course of Vanc and cefepime (stopped 9/24) she is s/p SFA stenting on 09/17/2020 for a completely occluded SFA.     Fever, worsen pain in the right toe, foul smell. likely gangrene of the right foot.   She  was seen by podiatry on 10/1, wound debridement was done   Elevated inflammatory makers   Foot xray showed multiple fracture and possible superimposed infections     MRI:  1st digit.  posttraumatic or secondary to infection/osteomyelitis.  Visualization is limited. Small fractures of the distal 2nd, 3rd and 4th metatarsals with associated edema could be post-traumatic.    Ddx include gangrene, superimposed infection, osteomyelitis    Lactate normalized    Plan:     - Holding abx for cultures in OR  - Podiatry consulted appreciate recs, will hold on vascular consult at this time as Podiatry planning for amputation of the right toe  - Follow Blood Cx  - resume plavix  - ID for help with antibiotics  - vanc and cefepime for broad spectrum coverage      Hyponatremia   today from 130, 127  Improving with dialysis  Hypervolemic Hyponatremia  -1.5L fluid restrictions      Hyperphosphatemia  Begin sevelemer with meals per nephrology recs      Normocytic anemia  Hb 10     Plan:   - Follow iron panel, folate and b12   - Daily CBC   - Transfuse for Hb < 7         Depression  Resume home Citalopram 20 mg QHS       ESRD (end stage renal disease) on dialysis  HD MWF  Last session was 10/16    Consult Nephrology for dialysis while in hospital       Diabetic ulcer of right heel associated with type 2 diabetes mellitus, with fat layer exposed        Debility  PT/OT   Home health OT, PT    Morbid obesity  BMI:   Comorbidities include: DM, HTN, ESRD    Plan:   - The goal of therapy is to prevent, treat or reverse to complications of obesity and improve quality of life  - Patient would benefit from comprehensive life style modifications   - Weight loss education   - Encourage exercise   - Dietary modifications Consider consult Dietician for weight loss diet education, appreciate recs   - Consider referral to Bariatric medicine/surgery         DDD (degenerative disc disease), lumbar  Continue home Gabapentin     Diabetic  peripheral neuropathy associated with type 2 diabetes mellitus  Continue home Gabapentin     Type 2 diabetes mellitus  Last Hemoglobin A1C 8.8 on 9/3  Home regimen: Home meds: levemir 45U BID, Novolog 15U TIDWM    Plan:   - Hold home meds   - Glucose goals while inpatient 140-180s  - Glucose checks q6   - 18 units bid + 12 Us TID WM with LDSS >> Adjust as needed   - Diet: Diabetic/Renal   - She needs a follow up with Endocrinology, diabetic diet education         Hypercholesterolemia  Continue home statin       Essential hypertension  Hypertensive on arrival  Continue home amlodipine 10mg, coreg 12.5mg BID (has been taking 25mg daily), hydralazine 25mg daily      VTE Risk Mitigation (From admission, onward)         Ordered     IP VTE HIGH RISK PATIENT  Once      10/15/20 1508     Place sequential compression device  Until discontinued      10/15/20 1508     Place sequential compression device  Until discontinued      10/13/20 1914                Discharge Planning   UMAIR: 10/17/2020     Code Status: Full Code   Is the patient medically ready for discharge?:     Reason for patient still in hospital (select all that apply): Consult recommendations  Discharge Plan A: Home Health                  Trent Lopez MD  Department of Hospital Medicine   [facility]

## 2020-10-16 NOTE — PROCEDURES
[facility] H[facility]ODIAL[facility]IS NOTE     Patient currently on hemodialysis for removal of uremic toxins and volume.     Patient seen and evaluated on hemodialysis, tolerating treatment, see HD flowsheet for vitals and assessments.      Ultrafiltration goal is 2L     Labs have been reviewed and the dialysate bath has been adjusted.     Assessment/Plan:  Seen on HD this morning, tolerating well.  No complaints voiced.   Discussed with primary team regarding abx regimen.  Can likely administer Abx at dialysis if ID able to recommend a TIW regimen and will not have to have a tunneled line placement.  Continue phos binders with meals  No need for EPO at this time.    ISSAC Cowan, FNP-BC  Nephrology  Pager:  [phone]

## 2020-10-16 NOTE — ASSESSMENT & PLAN NOTE
65 year old woman with DM, ESRD on HD, HTN, COPD, HFpEF, AD, chronic venous insufficiency who presented 10/13 with worsening right foot wound/gangrene.  Recent admission early September with same complaint. She was treated with vancomycin and cefepime pending plan for vascular and surgical intervention.  Antibiotics were discontinued 9/24.  She apparently had SFA stent placed on 9/17.  She presented to Podiatry clinic 10/12 with worsening gangrene and worsening wound to dorsal right 2nd toe.  MRI concerning for osteomyelitis of right hallux.  Afebrile, no leukocytosis. [id number], ESR 85.  She is now POD #1 partial right 1st ray amputation and arthroplasty and closure of the right 2nd toe.  Clean margin bone cultures are pending.   Blood cultures NGTD. She is currently on IV vancomycin and cefepime.  ID consulted for antibiotic tailoring/recommendations.        I discussed with Podiatry who feels they removed all of the infected bone (evidence of osteo on MRI in distal phalanx).  No concern for osteo in the 2nd toe. Skin and soft tissue debrided.  At time of visit, patient is sitting up on edge of bed, eating a burger.  No complaints.  Diarrhea yesterday, but now resolved.     Plan/recommendations:  1.  Continue vancomycin (pharmacy to dose) and cefepime (dialysis dosing) empirically  for now pending cultures.  Random vancomycin yesterday 20.3.  2.  Will follow cultures and adjust antibiotics accordingly   3.   If no growth on cultures, recommend empiric vancomycin (dose to be determined by Pharmacy) and cefepime 2 grams after each dialysis on MWF.  Duration of antibiotics to be determined pending path report. Would plan on 14 days antibiotics with dialysis, and  ID and Podiatry follow up in 7-10 days.   If pathology negative for osteo, and if looks clinically good, can discontinue antibiotics antibiotics at clinic follow up (recommend minimum of 7 days post op).  If pathology positive, will need 6 weeks.   5.  Will  follow up tomorrow with, hopefully, final recommendations.     Data reviewed and plan discussed with ID staff  Discussed with Primary Team

## 2020-10-16 NOTE — SUBJECTIVE & OBJECTIVE
Interval History: NAEON. Patient in dialysis chair resting comfortably. No new complaints/symptoms. Tolerated procedure well  Review of Systems   Constitutional: Positive for activity change and fatigue. Negative for appetite change, chills and fever.   HENT: Negative for congestion, sore throat and trouble swallowing.    Eyes: Negative for visual disturbance.   Respiratory: Negative for cough, choking, shortness of breath and wheezing.    Cardiovascular: Positive for leg swelling. Negative for chest pain and palpitations.   Gastrointestinal: Negative for abdominal distention, blood in stool, constipation, diarrhea, nausea and vomiting.   Genitourinary: Negative for dysuria.   Musculoskeletal: Positive for gait problem. Negative for back pain.   Skin: Positive for color change and wound (Right leg ).   Neurological: Negative for light-headedness and headaches.   Psychiatric/Behavioral: Negative for agitation.     Objective:     Vital Signs (Most Recent):  Temp: 98.3 °F (36.8 °C) (10/16/20 1057)  Pulse: 87 (10/16/20 1057)  Resp: 18 (10/16/20 1057)  BP: (!) 149/80 (10/16/20 1057)  SpO2: (!) 90 % (10/16/20 0720) Vital Signs (24h Range):  Temp:  [97.6 °F (36.4 °C)-99.3 °F (37.4 °C)] 98.3 °F (36.8 °C)  Pulse:  [70-88] 87  Resp:  [16-20] 18  SpO2:  [88 %-97 %] 90 %  BP: ()/(63-88) 149/80     Weight: 128.8 kg (284 lb)  Body mass index is 48.75 kg/m².    Intake/Output Summary (Last 24 hours) at 10/16/2020 1247  Last data filed at 10/16/2020 1057  Gross per 24 hour   Intake 900 ml   Output 2600 ml   Net -1700 ml      Physical Exam  Constitutional:       General: She is not in acute distress.     Appearance: She is obese. She is not ill-appearing.   HENT:      Head: Normocephalic and atraumatic.      Mouth/Throat:      Mouth: Mucous membranes are moist.      Pharynx: Oropharynx is clear.   Eyes:      Conjunctiva/sclera: Conjunctivae normal.      Pupils: Pupils are equal, round, and reactive to light.   Cardiovascular:       Rate and Rhythm: Normal rate.      Pulses: Normal pulses.   Pulmonary:      Effort: Pulmonary effort is normal. No respiratory distress.      Breath sounds: No wheezing.   Abdominal:      General: Bowel sounds are normal. There is no distension.      Palpations: Abdomen is soft.      Tenderness: There is no abdominal tenderness.   Musculoskeletal: Normal range of motion.         General: Deformity (RLE) present.      Right lower leg: Edema present.      Left lower leg: No edema.   Skin:     General: Skin is warm and dry.      Comments: Right lower extremity: Diffuse skin changes int he big toes, black in color, strong smell. Picture in the media. Venous stasis also noted in RLE     Neurological:      General: No focal deficit present.      Mental Status: She is alert and oriented to person, place, and time.   Psychiatric:         Mood and Affect: Mood normal.         Behavior: Behavior normal.         Significant Labs:   CBC:   Recent Labs   Lab 10/15/20  0426 10/16/20  0409   WBC 7.80 9.18   HGB 9.7* 10.0*   HCT 32.5* 33.5*    284     CMP:   Recent Labs   Lab 10/15/20  0426 10/16/20  0409   * 133*   K 4.5 4.5   CL 97 97   CO2 21* 19*   * 286*   BUN 43* 55*   CREATININE 6.8* 8.7*   CALCIUM 8.9 8.5*   PROT 7.0 7.0   ALBUMIN 2.8* 2.8*   BILITOT 0.4 0.4   ALKPHOS 71 67   AST 21 20   ALT 28 29   ANIONGAP 15 17*   EGFRNONAA 5.8* 4.3*     Magnesium:   Recent Labs   Lab 10/15/20  0426 10/16/20  0409   MG 2.1 2.1     Recent Labs     10/16/20  0409   PHOS 7.4*         POCT Glucose:   Recent Labs   Lab 10/15/20  1633 10/15/20  2109 10/16/20  0940   POCTGLUCOSE 145* 224* 145*       Significant Imaging: I have reviewed all pertinent imaging results/findings within the past 24 hours.

## 2020-10-16 NOTE — HPI
Ms. Kylie King is a 65 year old woman with DM, ESRD on HD, HTN, COPD, HFpEF, AD, chronic venous insufficiency who presented 10/13 with worsening right foot wound/gangrene.  Recent admission early September with same complaint. She was treated with vancomycin and cefepime pending plan for vascular and surgical intervention.  Antibiotics were discontinued 9/24.  She apparently had SFA stent placed on 9/17.  She presented to Podiatry clinic 10/12 with worsening gangrene and worsening wound to dorsal right 2nd toe.  MRI concerning for osteomyelitis of right hallux.  Afebrile, no leukocytosis. [id number], ESR 85.  She is now POD #1 partial right 1st ray amputation and arthroplasty and closure of the right 2nd toe.  Bone cultures are pending.   Blood cultures NGTD. She is currently on IV vancomycin and cefepime.  ID consulted for antibiotic tailoring/recommendations.

## 2020-10-16 NOTE — OP NOTE
Operative Note       Surgery Date: 10/15/2020     Surgeon(s) and Role:     * Billy Robles, PRINCE - Primary  Ryne Mcdonald DPM, PGY-3    Pre-op Diagnosis:  Wound infection [T14.8XXA, L08.9]  Dry gangrene [I96]  Diabetic ulcer of right great toe [E11.621, L97.519]    Post-op Diagnosis: Post-Op Diagnosis Codes:     * Wound infection [T14.8XXA, L08.9]     * Dry gangrene [I96]     * Diabetic ulcer of right great toe [E11.621, L97.519]    Procedure(s) (LRB):  PARTIAL RAY AMPUTATION GREAT TOE (Right)    Anesthesia: General/MAC    Procedure in Detail/Findings:  The patient was brought to the operating room on a stretcher and kept on the stretcher. in a  supine position. Following the successful induction of MAC: No tourniquet was applied and local block was given with 20 ml of 1% lidocaine and 0.5% marcaine plain. The right lower extremity was prepped and draped in a sterile manner.     A timeout was performed confirming the patient's identification, procedure, surgical site, medications and allergies. All were in agreement.    Tourniquet was not used    Attention to the right hallux and 2nd toe, there is a superficial wound to the dorsal aspect of the 2nd toe and gangrene to the right hallux.  A elliptical shaped incision was made around the hallux and the incision was linearly extended proximally. There was no purulent drainage during the incision. Sharp and blunt dissection was used to expose the MTPJ. A #15 blade was used to abraham the joint and disarticulate the toe from the metatarsal. The toe was sent for pathology.    The metatarsal appeared healthy but bone needed to be resected in order to close the wound. A sagittal saw was used to excise the head of the metatarsal just proximal to the sesamoids. The quality of the bone was hard. The capital fragment was split and sent for pathology and cultures clean margins.  The remaining sesamoids and capsular tissue and tendons were resected.    The wound was then flushed  with sterile saline solution.  Wound was then rearranged and advanced <10.1cm, closed using Monocryl for the deep layers and nylon for the skin.  Was some persistent bleeding toe a Penrose drain was placed into the wound.    Attention to the dorsal aspect of the 2nd toe.  The wound was excised in total and incision was deepened to reveal the PIPJ.  An arthroplasty was performed of the PIPJ. The wound was thoroughly irrigated with sterile saline and the wound was closed using nylon suture.    Incisions were dressed with Betadine-soaked Xeroform 4x4s Kerlix, ABDs and Ace.    The patient was transferred to the recovery room with vital signs stable. Following a period of post op monitoring, the patient will be returned to floor with the following post op instructions:   1. Keep dressing dry and intact until Podiatry follow up.   2.Weight bearing status: heel touch weight bearing for short transfers < 15 feet  3. All necessary prescriptions ordered and medical management will continue.   4. Contact Podiatry for all post op follow up care and if any problems arise.      Estimated Blood Loss: 50mL           Specimens (From admission, onward)     Start     Ordered    10/15/20 1514  Specimen to Pathology, Surgery Other  Once     Question Answer Comment   Procedure Type: Other    Specimen Class: Routine/Screening        10/15/20 1511              Implants: * No implants in log *           Disposition: PACU - hemodynamically stable.           Condition: Good    Attestation:  I was present and scrubbed for the entire procedure.

## 2020-10-16 NOTE — ANESTHESIA POSTPROCEDURE EVALUATION
Anesthesia Post Evaluation    Patient: Kylie King    Procedure(s) Performed: Procedure(s) (LRB):  PARTIAL RAY AMPUTATION GREAT TOE (Right)    Final Anesthesia Type: general    Patient location during evaluation: PACU  Patient participation: Yes- Able to Participate  Level of consciousness: awake and alert  Post-procedure vital signs: reviewed and stable  Pain management: adequate  Airway patency: patent    PONV status at discharge: No PONV  Anesthetic complications: no      Cardiovascular status: blood pressure returned to baseline  Respiratory status: unassisted  Hydration status: euvolemic  Follow-up not needed.          Vitals Value Taken Time   /81 10/15/20 1938   Temp 36.4 °C (97.6 °F) 10/15/20 1938   Pulse 75 10/15/20 1938   Resp 18 10/15/20 1938   SpO2 97 % 10/15/20 1938         No case tracking events are documented in the log.      Pain/Mario Score: Mario Score: 10 (10/15/2020  3:42 PM)

## 2020-10-16 NOTE — PROGRESS NOTES
Pharmacokinetic Assessment Follow Up: IV Vancomycin    Vancomycin serum concentration assessment/plan:    · HD MWF.  Last on 10/16 -- 2L removed.  Nephrology on board.  · ID consulted to discuss possible TIW abx regimen after dialysis and prevent having a tunneled line placed.   · Vancomycin 750 mg x 1 on 10/16 @ approximately 1800  · Random level for weekend of 10/16 and 10/17 with am labs  · Goal 15-20 mg/dL.  Re-dose if level < 20 mg/dL      Drug levels (last 3 results):  Recent Labs   Lab Result Units 10/14/20  0249 10/15/20  1756   Vancomycin, Random ug/mL 29.5 20.3       Pharmacy will continue to follow and monitor vancomycin.    Please contact pharmacy at extension [phone] for questions regarding this assessment.    Thank you for the consult,   Clint Berry       Patient brief summary:  Kylie King is a 65 y.o. female initiated on antimicrobial therapy with IV Vancomycin for treatment of bone/joint infection      Drug Allergies:   Review of patient's allergies indicates:   Allergen Reactions    Cyclobenzaprine Hallucinations    Erythromycin      Stomach upset       Actual Body Weight:   128 kg    Renal Function:   Estimated Creatinine Clearance: 8.5 mL/min (A) (based on SCr of 8.7 mg/dL (H)).,     Dialysis Method (if applicable):  intermittent HD MWF    CBC (last 72 hours):  Recent Labs   Lab Result Units 10/13/20  1706 10/14/20  0249 10/15/20  0426 10/16/20  0409   WBC K/uL 5.93 6.37 7.80 9.18   Hemoglobin g/dL 10.2* 10.2* 9.7* 10.0*   Hematocrit % 33.8* 34.0* 32.5* 33.5*   Platelets K/uL 230 249 272 284   Gran% % 60.4 63.0 71.7 73.3*   Lymph% % 22.4 20.6 15.4* 15.1*   Mono% % 13.8 12.2 9.7 8.6   Eosinophil% % 2.4 3.1 1.8 1.6   Basophil% % 0.5 0.6 0.6 0.4   Differential Method  Automated Automated Automated Automated       Metabolic Panel (last 72 hours):  Recent Labs   Lab Result Units 10/13/20  1706 10/14/20  0249 10/15/20  0426 10/16/20  0409   Sodium mmol/L 127* 130* 133* 133*   Potassium mmol/L  4.5 4.5 4.5 4.5   Chloride mmol/L 86* 86* 97 97   CO2 mmol/L 24 24 21* 19*   Glucose mg/dL 325* 286* 279* 286*   BUN, Bld mg/dL 47* 55* 43* 55*   Creatinine mg/dL 7.2* 7.7* 6.8* 8.7*   Albumin g/dL 3.0* 2.9* 2.8* 2.8*   Total Bilirubin mg/dL 0.5 0.6 0.4 0.4   Alkaline Phosphatase U/L 63 58 71 67   AST U/L 22 19 21 20   ALT U/L 28 27 28 29   Magnesium mg/dL  --  1.9 2.1 2.1   Phosphorus mg/dL  --  7.1* 6.7* 7.4*       Vancomycin Administrations:  vancomycin given in the last 96 hours                   vancomycin 2 g in dextrose 5 % 500 mL IVPB (mg) 2,000 mg New Bag 10/13/20 1839                Microbiologic Results:  Microbiology Results (last 7 days)     Procedure Component Value Units Date/Time    AFB Culture & Smear [[id number]] Collected: 10/15/20 1516    Order Status: Completed Specimen: Bone from Toe, Right Foot Updated: 10/16/20 1547     AFB CULTURE STAIN No acid fast bacilli seen.    Narrative:      First metatarsal    Culture, Anaerobe [[id number]] Collected: 10/15/20 1516    Order Status: Completed Specimen: Bone from Toe, Right Foot Updated: 10/16/20 1338     Anaerobic Culture Culture in progress    Narrative:      First metatarsal    Aerobic culture [[id number]] Collected: 10/15/20 1516    Order Status: Completed Specimen: Bone from Toe, Right Foot Updated: 10/16/20 0754     Aerobic Bacterial Culture No growth    Narrative:      First metatarsal    Gram stain [[id number]] Collected: 10/15/20 1516    Order Status: Completed Specimen: Bone from Toe, Right Foot Updated: 10/15/20 2118     Gram Stain Result Rare WBC's      No organisms seen    Narrative:      First metatarsal    Clostridium difficile EIA [[id number]]     Order Status: Canceled Specimen: Stool     Blood culture #1 **CANNOT BE ORDERED STAT** [[id number]] Collected: 10/13/20 1708    Order Status: Completed Specimen: Blood from Peripheral, Forearm, Left Updated: 10/15/20 1822     Blood Culture, Routine No Growth to date      No Growth to date      No  Growth to date    Blood culture #2 **CANNOT BE ORDERED STAT** [[id number]] Collected: 10/13/20 1716    Order Status: Completed Specimen: Blood from Peripheral, Antecubital, Left Updated: 10/15/20 1822     Blood Culture, Routine No Growth to date      No Growth to date      No Growth to date    Fungus culture [[id number]] Collected: 10/15/20 4876    Order Status: Sent Specimen: Bone from Toe, Right Foot Updated: 10/15/20 4198

## 2020-10-16 NOTE — PLAN OF CARE
AAOx4. No falls today. Pt ambulated down to vending machine and back at beginning of shift, no complaints. While getting ready for bed pt complained of pain shooting down leg so IV pain med was given. No complaints of pain since. VS stable. Pt now on Special precautions and waiting for a stool sample to be tested for C-diff.  ordered. Pt stated no diarrhea since around 4 pm 10/15/2020. Will continue to monitor.

## 2020-10-16 NOTE — ASSESSMENT & PLAN NOTE
Ms. King is a 63 yo F with T2DM, ESRD on HD (MWF), HFpEF (EF 50-55%), HTN, HLD, COPD, T2DM, chronic venous insufficiency, and anxiety/depression that presents for worsening R foot wound. She has multiple admission recently, for same complains, treated with course of Vanc and cefepime (stopped 9/24) she is s/p SFA stenting on 09/17/2020 for a completely occluded SFA.     Fever, worsen pain in the right toe, foul smell. likely gangrene of the right foot.   She was seen by podiatry on 10/1, wound debridement was done   Elevated inflammatory makers   Foot xray showed multiple fracture and possible superimposed infections     MRI:  1st digit.  posttraumatic or secondary to infection/osteomyelitis.  Visualization is limited. Small fractures of the distal 2nd, 3rd and 4th metatarsals with associated edema could be post-traumatic.    Ddx include gangrene, superimposed infection, osteomyelitis    Lactate normalized    Plan:     - Holding abx for cultures in OR  - Podiatry consulted appreciate recs, will hold on vascular consult at this time as Podiatry planning for amputation of the right toe  - Follow Blood Cx  - resume plavix  - ID for help with antibiotics  - vanc and cefepime for broad spectrum coverage

## 2020-10-16 NOTE — CONSULTS
[facility]  Infectious Disease  Consult Note    Patient Name: Kylie King  MRN: [id number]  Admission Date: 10/13/2020  Hospital Length of Stay: 3 days  Attending Physician: Gilles Hogue MD  Primary Care Provider: Virginia Foote MD     Isolation Status: No active isolations    Patient information was obtained from patient and past medical records.      Consults  Assessment/Plan:     Gangrene of toe of right foot     65 year old woman with DM, ESRD on HD, HTN, COPD, HFpEF, AD, chronic venous insufficiency who presented 10/13 with worsening right foot wound/gangrene.  Recent admission early September with same complaint. She was treated with vancomycin and cefepime pending plan for vascular and surgical intervention.  Antibiotics were discontinued 9/24.  She apparently had SFA stent placed on 9/17.  She presented to Podiatry clinic 10/12 with worsening gangrene and worsening wound to dorsal right 2nd toe.  MRI concerning for osteomyelitis of right hallux.  Afebrile, no leukocytosis. [id number], ESR 85.  She is now POD #1 partial right 1st ray amputation and arthroplasty and closure of the right 2nd toe.  Clean margin bone cultures are pending.   Blood cultures NGTD. She is currently on IV vancomycin and cefepime.  ID consulted for antibiotic tailoring/recommendations.        I discussed with Podiatry who feels they removed all of the infected bone (evidence of osteo on MRI in distal phalanx).  No concern for osteo in the 2nd toe. Skin and soft tissue debrided.  At time of visit, patient is sitting up on edge of bed, eating a burger.  No complaints.  Diarrhea yesterday, but now resolved.     Plan/recommendations:  1.  Continue vancomycin (pharmacy to dose) and cefepime (dialysis dosing) empirically  for now pending cultures.  Random vancomycin yesterday 20.3.  2.  Will follow cultures and adjust antibiotics accordingly   3.   If no growth on cultures, recommend empiric vancomycin (dose to be  determined by Pharmacy) and cefepime 2 grams after each dialysis on MWF.  Duration of antibiotics to be determined pending path report. Would plan on 14 days antibiotics with dialysis, and  ID and Podiatry follow up in 7-10 days.   If pathology negative for osteo, and if looks clinically good, can discontinue antibiotics antibiotics at clinic follow up (recommend minimum of 7 days post op).  If pathology positive, will need 6 weeks.   5.  Will follow up tomorrow with, hopefully, final recommendations.     Data reviewed and plan discussed with ID staff  Discussed with Primary Team      Thank you.   Please call for any questions or concerns.  ISSAC Schwartz, ANP-C  [phone] pager, [phone]    Subjective:     Principal Problem: Toe infection    HPI: Ms. Kylie King is a 65 year old woman with DM, ESRD on HD, HTN, COPD, HFpEF, AD, chronic venous insufficiency who presented 10/13 with worsening right foot wound/gangrene.  Recent admission early September with same complaint. She was treated with vancomycin and cefepime pending plan for vascular and surgical intervention.  Antibiotics were discontinued 9/24.  She apparently had SFA stent placed on 9/17.  She presented to Podiatry clinic 10/12 with worsening gangrene and worsening wound to dorsal right 2nd toe.  MRI concerning for osteomyelitis of right hallux.  Afebrile, no leukocytosis. [id number], ESR 85.  She is now POD #1 partial right 1st ray amputation and arthroplasty and closure of the right 2nd toe.  Bone cultures are pending.   Blood cultures NGTD. She is currently on IV vancomycin and cefepime.  ID consulted for antibiotic tailoring/recommendations.      Past Medical History:   Diagnosis Date    Anxiety     Arthritis     DDD, Lumbar    Breast cancer 1/2013    left breast- invasive mammary carcinoma, ER/IN positive, Her2 negative    Carotid artery stenosis 8/13/2018 6/22/2018: Carotid Duplex: SHANELL: Moderate plaquing - 2.0 m/s - <50%. LICA:  Moderate plaquing - 1.6 m/s - <50%.    Cataract     Choledocholithiasis     s/p ERCP and stent placement    Chronic obstructive pulmonary disease 6/8/2018    Chronic venous insufficiency     Colon cancer 2001    CRI (chronic renal insufficiency)     one kidney    Dialysis AV fistula malfunction     ESRD (end stage renal disease) on dialysis     Former smoker 6/8/2018    GERD (gastroesophageal reflux disease)     History of acute pancreatitis 2009 2/09 s/p sphincterotomy    History of cerebrovascular accident 6/8/2018    History of colon cancer     s/p sigmoid colectomy    HTN (hypertension), benign     Hypercholesterolemia     Hyperlipidemia     Hypoxemia 6/8/2018    Intracerebral hemorrhage     Kidney stone     left    Lymphedema of both lower extremities     Obesity     Pancreatitis     Pancreatitis 2008    Physical deconditioning     Pulmonary edema     Sacroiliac joint pain     Spinal stenosis     Spinal stenosis, lumbar     Stroke 12/2012    Tobacco abuse     Type 2 diabetes mellitus with neurological manifestations, controlled     Neuropathy       Past Surgical History:   Procedure Laterality Date    ANGIOGRAPHY OF LOWER EXTREMITY Right 9/17/2020    Procedure: Angiogram Extremity Unilateral;  Surgeon: RICK Pollard III, MD;  Location: [facility] OR [address] [place] [address];  Service: Peripheral Vascular;  Laterality: Right;  6.2 minutes of fluro  690.88 mGy  112.64 Gycm2  55 ml    AV FISTULA PLACEMENT Right 5/28/2018    Procedure: CREATION -FISTULA-AV;  Surgeon: RICK Pollard III, MD;  Location: [facility] OR [address] [place] [address];  Service: Peripheral Vascular;  Laterality: Right;    BREAST BIOPSY  1/2012    left breast invasive mammary carcinoma (lateral bx) and intraductal papilloma (medial bx)    BREAST BIOPSY  1999    rt breast FA    CATARACT EXTRACTION W/  INTRAOCULAR LENS IMPLANT Right 1/24/2019        CATARACT EXTRACTION W/  INTRAOCULAR LENS IMPLANT Left 1/31/2019    Procedure: EXTRACTION,  CATARACT, WITH IOL INSERTION;  Surgeon: Immanuel Moncada MD;  Location: [facility];  Service: Ophthalmology;  Laterality: Left;    CHOLECYSTECTOMY  2001    COLON SURGERY      FOOT AMPUTATION THROUGH METATARSAL Right 10/15/2020    Procedure: PARTIAL RAY AMPUTATION GREAT TOE;  Surgeon: Billy Robles DPM;  Location: [facility] OR [address] [place] [address];  Service: Podiatry;  Laterality: Right;  Stretcher OK    HERNIA REPAIR      left nephrectomy      atrophic kidney and hydronephrosis    left oopherectomy  2001    MASTECTOMY  2013    left    NEPHRECTOMY  2011    lap    REVISION OF ARTERIOVENOUS FISTULA Right 8/15/2018    Procedure: REVISION, AV FISTULA;  Surgeon: RICK Pollard III, MD;  Location: [facility] OR [address] [place] [address];  Service: Peripheral Vascular;  Laterality: Right;    SIGMOIDECTOMY  2001    TOTAL REDUCTION MAMMOPLASTY Right 2013       Review of patient's allergies indicates:   Allergen Reactions    Cyclobenzaprine Hallucinations    Erythromycin      Stomach upset       Medications:  Medications Prior to Admission   Medication Sig    acetaminophen (TYLENOL) 325 MG tablet Take 650 mg by mouth every 6 (six) hours as needed for mild pain 1-3/10 pain scale.    acetaminophen-codeine 300-30mg (TYLENOL #3) 300-30 mg Tab Take 1 tablet by mouth every 6 (six) hours as needed.    albuterol (PROVENTIL/VENTOLIN HFA) 90 mcg/actuation inhaler Inhale 2 puffs into the lungs every 6 (six) hours as needed for Wheezing. Rescue    amLODIPine (NORVASC) 10 MG tablet Take 1 tablet (10 mg total) by mouth every morning. (Patient taking differently: Take 10 mg by mouth every evening. )    ammonium lactate 12 % Crea Apply twice daily to affected parts both feet as needed.    aspirin (ECOTRIN) 81 MG EC tablet Take 1 tablet (81 mg total) by mouth once daily.    atorvastatin (LIPITOR) 40 MG tablet Take 1 tablet (40 mg total) by mouth once daily.    carvediloL (COREG) 25 MG tablet Take 1 tablet (25 mg total) by mouth 2 (two) times daily.    citalopram  "(CELEXA) 20 MG tablet Take 1 tablet (20 mg total) by mouth nightly.    clopidogreL (PLAVIX) 75 mg tablet Take 1 tablet (75 mg total) by mouth once daily.    diclofenac sodium (VOLTAREN) 1 % Gel Apply 2 grams topically 4 (four) times daily.    famotidine (PEPCID) 20 MG tablet Take 1 tablet (20 mg total) by mouth nightly as needed.    fish oil-omega-3 fatty acids 300-1,000 mg capsule Take 1 capsule by mouth every morning.    fluticasone (FLONASE) 50 mcg/actuation nasal spray 2 sprays (100 mcg total) by Each Nare route once daily. (Patient taking differently: 2 sprays by Each Nostril route daily as needed for Rhinitis or Allergies. )    gabapentin (NEURONTIN) 400 MG capsule Take 1 capsule (400 mg total) by mouth every evening.    honey (MEDIHONEY, HONEY,) 80 % Gel Apply 1 application topically 3 (three) times daily.    hydrALAZINE (APRESOLINE) 25 MG tablet Take 1 tablet (25 mg total) by mouth 2 (two) times daily.    insulin detemir U-100 (LEVEMIR) 100 unit/mL injection Inject 45 Units into the skin 2 (two) times daily.    insulin lispro (HUMALOG KWIKPEN INSULIN) 100 unit/mL pen BLOOD GLUCOSE 150-200, INJECT 1 UNITS UNDER THE SKIN, 201-250, 2 UNITS, 251-300, 3 UNITS THREE TIMES A DAY AS DIRECTED (Patient taking differently: Inject 15-20 Units into the skin 3 (three) times daily. AS DIRECTED)    ketoconazole (NIZORAL) 2 % cream Apply topically once daily.    letrozole (FEMARA) 2.5 mg Tab Take 1 tablet (2.5 mg total) by mouth nightly.    BD INSULIN PEN NEEDLE UF MINI 31 gauge x 3/16" Ndle USE 5 NEEDLES PER DAY    insulin lispro (HUMALOG KWIKPEN INSULIN) 100 unit/mL pen BLOOD GLUCOSE 150-200, INJECT 1 UNITS UNDER THE SKIN, 201-250, 2 UNITS, 251-300, 3 UNITS THREE TIMES A DAY AS DIRECTED    insulin needles, disposable, (PEN NEEDLE, DIABETIC) 31 Ndle Patient needs 5 needles a day    lancets (ONETOUCH DELICA LANCETS) 33 gauge Misc 1 lancet by Misc.(Non-Drug; Combo Route) route 4 (four) times daily before meals " "and nightly.    pen needle, diabetic 32 gauge x " Ndle 1 Device by Misc.(Non-Drug; Combo Route) route 5 (five) times daily.     Antibiotics (From admission, onward)    None        Antifungals (From admission, onward)    None        Antivirals (From admission, onward)    None           Immunization History   Administered Date(s) Administered    Hepatitis B, Adult 2018, 2018, 2018, 2018, 2020, 2020, 2020, 2020    Influenza (Flumist) - Quadrivalent - Intranasal *Preferred* (2-49 years old) 2014, 11/10/2016, 2017    Influenza - Quadrivalent 2014, 2019    Influenza - Quadrivalent - PF *Preferred* (6 months and older) 11/10/2016, 2017    Influenza - Trivalent - PF (ADULT) 2012    PPD Test 2018    Pneumococcal Conjugate - 13 Valent 2016    Pneumococcal Polysaccharide - 23 Valent 2011    Tdap 10/13/2020       Family History     Problem Relation (Age of Onset)    Arthritis Mother    Breast cancer Maternal Grandmother    Cancer Maternal Grandmother, Maternal Aunt    Celiac disease Sister    Diabetes Father    Heart disease Mother, Father, Maternal Grandmother        Social History     Socioeconomic History    Marital status: Single     Spouse name: Not on file    Number of children: 2    Years of education: Not on file    Highest education level: Not on file   Occupational History    Occupation: not working     Employer: [email or web address]   Social Needs    Financial resource strain: Somewhat hard    Food insecurity     Worry: Never true     Inability: Never true    Transportation needs     Medical: Yes     Non-medical: Yes   Tobacco Use    Smoking status: Current Some Day Smoker     Packs/day: 0.50     Years: 28.00     Pack years: 14.00     Types: Cigarettes     Start date: 1990     Last attempt to quit: 2018     Years since quittin.7    Smokeless tobacco: Never Used    Tobacco comment: " anxious   Substance and Sexual Activity    Alcohol use: No     Frequency: Never     Drinks per session: Patient refused     Binge frequency: Never    Drug use: No    Sexual activity: Never     Partners: Male   Lifestyle    Physical activity     Days per week: 0 days     Minutes per session: Not on file    Stress: Only a little   Relationships    Social connections     Talks on phone: More than three times a week     Gets together: Once a week     Attends Zoroastrianism service: Not on file     Active member of club or organization: No     Attends meetings of clubs or organizations: Never     Relationship status:    Other Topics Concern    Not on file   Social History Narrative    She is not exercising regularly     Review of Systems   Constitutional: Positive for activity change and fatigue. Negative for appetite change, chills, diaphoresis and fever.   HENT: Negative.    Respiratory: Negative for cough, choking, shortness of breath and wheezing.    Cardiovascular: Positive for leg swelling. Negative for chest pain and palpitations.   Gastrointestinal: Positive for diarrhea (yesterday, resolved). Negative for abdominal distention, blood in stool, constipation, nausea and vomiting.   Genitourinary: Negative for dysuria.        HD - still makes small amount urine   Musculoskeletal: Positive for gait problem. Negative for back pain.   Skin: Positive for color change and wound (Right leg ).   Neurological: Negative for dizziness and headaches.   Psychiatric/Behavioral: Negative for agitation and confusion.     Objective:     Vital Signs (Most Recent):  Temp: 98.3 °F (36.8 °C) (10/16/20 1057)  Pulse: 91 (10/16/20 1300)  Resp: 18 (10/16/20 1057)  BP: (!) 192/98 (10/16/20 1300)  SpO2: (!) 90 % (10/16/20 0720) Vital Signs (24h Range):  Temp:  [97.6 °F (36.4 °C)-99.3 °F (37.4 °C)] 98.3 °F (36.8 °C)  Pulse:  [71-91] 91  Resp:  [16-20] 18  SpO2:  [88 %-97 %] 90 %  BP: ()/(63-98) 192/98     Weight: 128.8 kg  (284 lb)  Body mass index is 48.75 kg/m².    Estimated Creatinine Clearance: 8.5 mL/min (A) (based on SCr of 8.7 mg/dL (H)).    Physical Exam  Vitals signs and nursing note reviewed.   Constitutional:       General: She is not in acute distress.     Appearance: She is obese. She is not ill-appearing, toxic-appearing or diaphoretic.   HENT:      Head: Normocephalic and atraumatic.      Mouth/Throat:      Mouth: Mucous membranes are moist.   Eyes:      General: No scleral icterus.     Conjunctiva/sclera: Conjunctivae normal.   Cardiovascular:      Rate and Rhythm: Normal rate and regular rhythm.   Pulmonary:      Effort: Pulmonary effort is normal. No respiratory distress.      Breath sounds: No wheezing or rales.   Abdominal:      General: Bowel sounds are normal. There is no distension.      Palpations: Abdomen is soft.      Tenderness: There is no abdominal tenderness.   Musculoskeletal: Normal range of motion.         General: No deformity.      Right lower leg: Edema present.      Left lower leg: No edema.      Comments: Right foot with surgical dressing.  Warmth and erythema above dressing.  Venous stasis changes to RLE    See pre-operative photos below.     Skin:     General: Skin is warm and dry.      Comments: Right lower extremity: Diffuse skin changes int he big toes, black in color, strong smell. Picture in the media. Venous stasis also noted in RLE     Neurological:      General: No focal deficit present.      Mental Status: She is alert and oriented to person, place, and time.   Psychiatric:         Mood and Affect: Mood normal.         Behavior: Behavior normal.       Pre-operative photos            Significant Labs:   Blood Culture:   Recent Labs   Lab 09/03/20  2243 10/13/20  1708 10/13/20  1716   LABBLOO No growth after 5 days.  No growth after 5 days. No Growth to date  No Growth to date  No Growth to date No Growth to date  No Growth to date  No Growth to date     CBC:   Recent Labs   Lab  10/15/20  0426 10/16/20  0409   WBC 7.80 9.18   HGB 9.7* 10.0*   HCT 32.5* 33.5*    284     CMP:   Recent Labs   Lab 10/15/20  0426 10/16/20  0409   * 133*   K 4.5 4.5   CL 97 97   CO2 21* 19*   * 286*   BUN 43* 55*   CREATININE 6.8* 8.7*   CALCIUM 8.9 8.5*   PROT 7.0 7.0   ALBUMIN 2.8* 2.8*   BILITOT 0.4 0.4   ALKPHOS 71 67   AST 21 20   ALT 28 29   ANIONGAP 15 17*   EGFRNONAA 5.8* 4.3*     Wound Culture:   Recent Labs   Lab 10/15/20  1516   LABAERO No growth       Significant Imaging: I have reviewed all pertinent imaging results/findings within the past 24 hours.

## 2020-10-17 VITALS
WEIGHT: 284 LBS | DIASTOLIC BLOOD PRESSURE: 84 MMHG | BODY MASS INDEX: 48.49 KG/M2 | RESPIRATION RATE: 18 BRPM | HEART RATE: 71 BPM | SYSTOLIC BLOOD PRESSURE: 123 MMHG | OXYGEN SATURATION: 94 % | TEMPERATURE: 98 F | HEIGHT: 64 IN

## 2020-10-17 LAB
ALBUMIN SERPL BCP-MCNC: 2.8 G/DL (ref 3.5–5.2)
ALP SERPL-CCNC: 65 U/L (ref 55–135)
ALT SERPL W/O P-5'-P-CCNC: 28 U/L (ref 10–44)
ANION GAP SERPL CALC-SCNC: 16 MMOL/L (ref 8–16)
AST SERPL-CCNC: 28 U/L (ref 10–40)
BASOPHILS # BLD AUTO: 0.07 K/UL (ref 0–0.2)
BASOPHILS NFR BLD: 0.8 % (ref 0–1.9)
BILIRUB SERPL-MCNC: 0.4 MG/DL (ref 0.1–1)
BUN SERPL-MCNC: 40 MG/DL (ref 8–23)
CALCIUM SERPL-MCNC: 8.7 MG/DL (ref 8.7–10.5)
CHLORIDE SERPL-SCNC: 98 MMOL/L (ref 95–110)
CO2 SERPL-SCNC: 23 MMOL/L (ref 23–29)
CREAT SERPL-MCNC: 6.7 MG/DL (ref 0.5–1.4)
DIFFERENTIAL METHOD: ABNORMAL
EOSINOPHIL # BLD AUTO: 0.1 K/UL (ref 0–0.5)
EOSINOPHIL NFR BLD: 1.6 % (ref 0–8)
ERYTHROCYTE [DISTWIDTH] IN BLOOD BY AUTOMATED COUNT: 19.9 % (ref 11.5–14.5)
EST. GFR  (AFRICAN AMERICAN): 6.8 ML/MIN/1.73 M^2
EST. GFR  (NON AFRICAN AMERICAN): 5.9 ML/MIN/1.73 M^2
GLUCOSE SERPL-MCNC: 224 MG/DL (ref 70–110)
HCT VFR BLD AUTO: 30.6 % (ref 37–48.5)
HGB BLD-MCNC: 9 G/DL (ref 12–16)
IMM GRANULOCYTES # BLD AUTO: 0.08 K/UL (ref 0–0.04)
IMM GRANULOCYTES NFR BLD AUTO: 0.9 % (ref 0–0.5)
LYMPHOCYTES # BLD AUTO: 1.5 K/UL (ref 1–4.8)
LYMPHOCYTES NFR BLD: 16.5 % (ref 18–48)
MAGNESIUM SERPL-MCNC: 2 MG/DL (ref 1.6–2.6)
MCH RBC QN AUTO: 28 PG (ref 27–31)
MCHC RBC AUTO-ENTMCNC: 29.4 G/DL (ref 32–36)
MCV RBC AUTO: 95 FL (ref 82–98)
MONOCYTES # BLD AUTO: 0.8 K/UL (ref 0.3–1)
MONOCYTES NFR BLD: 9.3 % (ref 4–15)
NEUTROPHILS # BLD AUTO: 6.4 K/UL (ref 1.8–7.7)
NEUTROPHILS NFR BLD: 70.9 % (ref 38–73)
NRBC BLD-RTO: 0 /100 WBC
PHOSPHATE SERPL-MCNC: 6.3 MG/DL (ref 2.7–4.5)
PLATELET # BLD AUTO: 284 K/UL (ref 150–350)
PMV BLD AUTO: 10.9 FL (ref 9.2–12.9)
POCT GLUCOSE: 195 MG/DL (ref 70–110)
POCT GLUCOSE: 219 MG/DL (ref 70–110)
POTASSIUM SERPL-SCNC: 4.7 MMOL/L (ref 3.5–5.1)
PROT SERPL-MCNC: 7.3 G/DL (ref 6–8.4)
RBC # BLD AUTO: 3.22 M/UL (ref 4–5.4)
SODIUM SERPL-SCNC: 137 MMOL/L (ref 136–145)
VANCOMYCIN SERPL-MCNC: 25.5 UG/ML
WBC # BLD AUTO: 9.03 K/UL (ref 3.9–12.7)

## 2020-10-17 PROCEDURE — 27000221 HC OXYGEN, UP TO 24 HOURS

## 2020-10-17 PROCEDURE — 25000003 PHARM REV CODE 250: Performed by: STUDENT IN AN ORGANIZED HEALTH CARE EDUCATION/TRAINING PROGRAM

## 2020-10-17 PROCEDURE — 80053 COMPREHEN METABOLIC PANEL: CPT

## 2020-10-17 PROCEDURE — 83735 ASSAY OF MAGNESIUM: CPT

## 2020-10-17 PROCEDURE — 36415 COLL VENOUS BLD VENIPUNCTURE: CPT

## 2020-10-17 PROCEDURE — 84100 ASSAY OF PHOSPHORUS: CPT

## 2020-10-17 PROCEDURE — 99233 SBSQ HOSP IP/OBS HIGH 50: CPT | Mod: ,,, | Performed by: NURSE PRACTITIONER

## 2020-10-17 PROCEDURE — 80202 ASSAY OF VANCOMYCIN: CPT

## 2020-10-17 PROCEDURE — 99233 PR SUBSEQUENT HOSPITAL CARE,LEVL III: ICD-10-PCS | Mod: ,,, | Performed by: NURSE PRACTITIONER

## 2020-10-17 PROCEDURE — 85025 COMPLETE CBC W/AUTO DIFF WBC: CPT

## 2020-10-17 RX ORDER — CEFEPIME HYDROCHLORIDE 2 G/1
2 INJECTION, POWDER, FOR SOLUTION INTRAVENOUS
Qty: 12 G | Refills: 0 | Status: SHIPPED | OUTPATIENT
Start: 2020-10-19 | End: 2020-11-02

## 2020-10-17 RX ORDER — SODIUM CHLORIDE 9 MG/ML
INJECTION, SOLUTION INTRAVENOUS
Status: DISCONTINUED | OUTPATIENT
Start: 2020-10-19 | End: 2020-10-17 | Stop reason: HOSPADM

## 2020-10-17 RX ORDER — SODIUM CHLORIDE 9 MG/ML
INJECTION, SOLUTION INTRAVENOUS ONCE
Status: DISCONTINUED | OUTPATIENT
Start: 2020-10-19 | End: 2020-10-17 | Stop reason: HOSPADM

## 2020-10-17 RX ORDER — SEVELAMER CARBONATE 800 MG/1
800 TABLET, FILM COATED ORAL
Qty: 90 TABLET | Refills: 11 | Status: SHIPPED | OUTPATIENT
Start: 2020-10-17 | End: 2021-01-01

## 2020-10-17 RX ORDER — CEFEPIME HYDROCHLORIDE 2 G/1
2 INJECTION, POWDER, FOR SOLUTION INTRAVENOUS
Status: DISCONTINUED | OUTPATIENT
Start: 2020-10-19 | End: 2020-10-17 | Stop reason: HOSPADM

## 2020-10-17 RX ADMIN — INSULIN ASPART 2 UNITS: 100 INJECTION, SOLUTION INTRAVENOUS; SUBCUTANEOUS at 08:10

## 2020-10-17 RX ADMIN — SEVELAMER CARBONATE 800 MG: 800 TABLET, FILM COATED ORAL at 08:10

## 2020-10-17 RX ADMIN — ASPIRIN 81 MG: 81 TABLET, COATED ORAL at 08:10

## 2020-10-17 RX ADMIN — INSULIN ASPART 12 UNITS: 100 INJECTION, SOLUTION INTRAVENOUS; SUBCUTANEOUS at 01:10

## 2020-10-17 RX ADMIN — ATORVASTATIN CALCIUM 40 MG: 20 TABLET, FILM COATED ORAL at 08:10

## 2020-10-17 RX ADMIN — SEVELAMER CARBONATE 800 MG: 800 TABLET, FILM COATED ORAL at 01:10

## 2020-10-17 RX ADMIN — INSULIN ASPART 12 UNITS: 100 INJECTION, SOLUTION INTRAVENOUS; SUBCUTANEOUS at 08:10

## 2020-10-17 RX ADMIN — CLOPIDOGREL 75 MG: 75 TABLET, FILM COATED ORAL at 08:10

## 2020-10-17 RX ADMIN — INSULIN DETEMIR 18 UNITS: 100 INJECTION, SOLUTION SUBCUTANEOUS at 08:10

## 2020-10-17 RX ADMIN — HYDROCODONE BITARTRATE AND ACETAMINOPHEN 1 TABLET: 5; 325 TABLET ORAL at 04:10

## 2020-10-17 NOTE — PLAN OF CARE
[facility]    HOME HEALTH ORDERS  FACE TO FACE ENCOUNTER    Patient Name: Kylie King  YOB: 1955    PCP: Virginia Foote MD   PCP Address: [address] / [place] [address]  PCP Phone Number: [phone]  PCP Fax: [phone]    Encounter Date: 10/17/2020    Admit to Home Health    Diagnoses:  Active Hospital Problems    Diagnosis  POA    *Right toe wound infection  [L08.9]  Yes    Dry gangrene [I96]  Yes    Hyperphosphatemia [E83.39]  Unknown    Hyponatremia [E87.1]  Unknown    Normocytic anemia [D64.9]  Unknown    Gangrene of toe of right foot [I96]  Yes    Depression [F32.9]  Yes    Anemia in ESRD (end-stage renal disease) [N18.6, D63.1]  Yes    ESRD (end stage renal disease) on dialysis [N18.6, Z99.2]  Not Applicable     4/2018: Began HD.      Heart failure, diastolic, chronic [I50.32]  Yes     4/20/2018: Echo: Normal left ventricular size with low normal systolic function. EF 50-55%. Mild LVH. Moderate diastolic dysfunction.      Debility [R53.81]  Yes    Morbid obesity [E66.01]  Yes     6/8/2018: Weight 119 kg. BMI 44.      DDD (degenerative disc disease), lumbar [M51.36]  Yes    Lumbar stenosis [M48.061]  Yes    History of breast cancer [Z85.3]  Not Applicable     2013: Left mastectomy.      Diabetic peripheral neuropathy associated with type 2 diabetes mellitus [E11.42]  Yes    Type 2 diabetes mellitus [E11.9]  Yes     2007: Diagnosed. Complications: CVA & ESRD. Medications: Insulin.      Essential hypertension [I10]  Yes     1999: Diagnosed.      Hypercholesterolemia [E78.00]  Yes     2016: Began statin.        Resolved Hospital Problems   No resolved problems to display.       Future Appointments   Date Time Provider Department Center   10/20/2020 10:00 AM Tuan Thorne MD [facility] [place]   12/22/2020  1:00 PM VASCULAR, LAB [facility] [place] greg   12/22/2020  2:00 PM VASCULAR, LAB [facility] [place] [facility]   12/22/2020  2:45 PM RICK ALMAGUER  Latrice VALENZUELA MD [facility]     Follow-up Information     Tuan Thorne MD On 10/20/2020.    Specialty: Internal Medicine  Why: scheduled @1000  Contact information:  [address] [place] [address]  [phone]                     I have seen and examined this patient face to face today. My clinical findings that support the need for the home health skilled services and home bound status are the following:  Weakness/numbness causing balance and gait disturbance due to Infection and Weakness/Debility making it taxing to leave home.    Allergies:  Review of patient's allergies indicates:   Allergen Reactions    Cyclobenzaprine Hallucinations    Erythromycin      Stomach upset       Diet: diabetic diet: 2000 calorie and renal diet    Activities: activity as tolerated    Nursing:   SN to complete comprehensive assessment including routine vital signs. Instruct on disease process and s/s of complications to report to MD. Review/verify medication list sent home with the patient at time of discharge  and instruct patient/caregiver as needed. Frequency may be adjusted depending on start of care date.    Notify MD if SBP > 160 or < 90; DBP > 90 or < 50; HR > 120 or < 50; Temp > 101; Other:       Labs to be drawn in dialysis:    Weekly CBC, CMP, ESR, CRP  - TO BE DRAWN IN DIALYSIS  Fax results to ISSAC Schwartz, ANP at [phone]      ANTIBIOTIC TO BE GIVEN AFTER EACH HEMODIALYSIS SESSION in the dialysis center    ceFEPIme (MAXIPIME) 2 gram injection END 10/30   Inject 2 g into the vein every Mon, Wed, Fri. AFTER DIALYSIS for 14 days  Qty: 12 g, Refills: 0           CONSULTS:    Patient refusing PT/OT    MISCELLANEOUS CARE:  Diabetic Care:   SN to perform and educate Diabetic management with blood glucose monitoring:    WOUND CARE ORDERS  n/a      Medications: Review discharge medications with patient and family and provide education.      Current Discharge Medication List      START taking  these medications    Details   ceFEPIme (MAXIPIME) 2 gram injection Inject 2 g into the vein every Mon, Wed, Fri. AFTER DIALYSIS for 14 days  Qty: 12 g, Refills: 0    Comments: AFTER DIALYSIS      sevelamer carbonate (RENVELA) 800 mg Tab Take 1 tablet (800 mg total) by mouth 3 (three) times daily with meals.  Qty: 90 tablet, Refills: 11         CONTINUE these medications which have NOT CHANGED    Details   acetaminophen (TYLENOL) 325 MG tablet Take 650 mg by mouth every 6 (six) hours as needed for mild pain 1-3/10 pain scale.      albuterol (PROVENTIL/VENTOLIN HFA) 90 mcg/actuation inhaler Inhale 2 puffs into the lungs every 6 (six) hours as needed for Wheezing. Rescue  Qty: 18 g, Refills: 3      amLODIPine (NORVASC) 10 MG tablet Take 1 tablet (10 mg total) by mouth every morning.  Qty: 90 tablet, Refills: 3      ammonium lactate 12 % Crea Apply twice daily to affected parts both feet as needed.  Qty: 140 g, Refills: 11      aspirin (ECOTRIN) 81 MG EC tablet Take 1 tablet (81 mg total) by mouth once daily.  Qty: 30 tablet, Refills: 11      atorvastatin (LIPITOR) 40 MG tablet Take 1 tablet (40 mg total) by mouth once daily.  Qty: 90 tablet, Refills: 3      carvediloL (COREG) 25 MG tablet Take 1 tablet (25 mg total) by mouth 2 (two) times daily.  Qty: 180 tablet, Refills: 3    Associated Diagnoses: Essential hypertension      citalopram (CELEXA) 20 MG tablet Take 1 tablet (20 mg total) by mouth nightly.  Qty: 90 tablet, Refills: 3    Associated Diagnoses: Depression, unspecified depression type      clopidogreL (PLAVIX) 75 mg tablet Take 1 tablet (75 mg total) by mouth once daily.  Qty: 30 tablet, Refills: 11      diclofenac sodium (VOLTAREN) 1 % Gel Apply 2 grams topically 4 (four) times daily.  Qty: 100 g, Refills: 3      famotidine (PEPCID) 20 MG tablet Take 1 tablet (20 mg total) by mouth nightly as needed.  Qty: 90 tablet, Refills: 3      fish oil-omega-3 fatty acids 300-1,000 mg capsule Take 1 capsule by mouth  "every morning.      fluticasone (FLONASE) 50 mcg/actuation nasal spray 2 sprays (100 mcg total) by Each Nare route once daily.  Qty: 1 Bottle, Refills: 2      gabapentin (NEURONTIN) 400 MG capsule Take 1 capsule (400 mg total) by mouth every evening.  Qty: 90 capsule, Refills: 3    Associated Diagnoses: Tethered cord; Spondylosis without myelopathy; DDD (degenerative disc disease), lumbosacral; Spinal stenosis of lumbar region with neurogenic claudication; Thoracic and lumbosacral neuritis; Acquired spondylolisthesis; Bilateral low back pain without sciatica, unspecified chronicity      honey (MEDIHONEY, HONEY,) 80 % Gel Apply 1 application topically 3 (three) times daily.  Qty: 44 mL, Refills: 3      hydrALAZINE (APRESOLINE) 25 MG tablet Take 1 tablet (25 mg total) by mouth 2 (two) times daily.  Qty: 180 tablet, Refills: 3    Comments: .      insulin detemir U-100 (LEVEMIR) 100 unit/mL injection Inject 45 Units into the skin 2 (two) times daily.      !! insulin lispro (HUMALOG KWIKPEN INSULIN) 100 unit/mL pen BLOOD GLUCOSE 150-200, INJECT 1 UNITS UNDER THE SKIN, 201-250, 2 UNITS, 251-300, 3 UNITS THREE TIMES A DAY AS DIRECTED  Qty: 1 Box, Refills: 3      ketoconazole (NIZORAL) 2 % cream Apply topically once daily.  Qty: 1 Tube, Refills: 2      letrozole (FEMARA) 2.5 mg Tab Take 1 tablet (2.5 mg total) by mouth nightly.  Qty: 90 tablet, Refills: 3      BD INSULIN PEN NEEDLE UF MINI 31 gauge x 3/16" Ndle USE 5 NEEDLES PER DAY  Qty: 450 each, Refills: 2      !! insulin lispro (HUMALOG KWIKPEN INSULIN) 100 unit/mL pen BLOOD GLUCOSE 150-200, INJECT 1 UNITS UNDER THE SKIN, 201-250, 2 UNITS, 251-300, 3 UNITS THREE TIMES A DAY AS DIRECTED      insulin needles, disposable, (PEN NEEDLE, DIABETIC) 31 Ndle Patient needs 5 needles a day  Qty: 500 each, Refills: 3      lancets (ONETOUCH DELICA LANCETS) 33 gauge Misc 1 lancet by Misc.(Non-Drug; Combo Route) route 4 (four) times daily before meals and nightly.  Qty: 400 each, " "Refills: 4      pen needle, diabetic 32 gauge x 5/32" Ndle 1 Device by Misc.(Non-Drug; Combo Route) route 5 (five) times daily.  Qty: 450 each, Refills: 6       !! - Potential duplicate medications found. Please discuss with provider.      STOP taking these medications       acetaminophen-codeine 300-30mg (TYLENOL #3) 300-30 mg Tab Comments:   Reason for Stopping:               I certify that this patient is confined to her home and needs physical therapy and occupational therapy. - Patient is refusing home health PT/OT        "

## 2020-10-17 NOTE — PLAN OF CARE
Pt AAOx4, VSS, although Pt BP was low this morning at 107/59 MD is aware and Coreg and Hydralazine were held. No complaints of pain, free of falls and injuries. BS and Tele monitoring maintained per orders. Pt received correction insulin per orders. Pt 1.5L Fluid restriction maintained per orders. See previous notes. D/C education done, IV access removed. Pt awaiting transport home.   Problem: Fall Injury Risk  Goal: Absence of Fall and Fall-Related Injury  Outcome: Adequate for Care Transition  Intervention: Identify and Manage Contributors to Fall Injury Risk  Flowsheets (Taken 10/17/2020 1513)  Self-Care Promotion:   independence encouraged   BADL personal objects within reach   BADL personal routines maintained   meal setup provided   safe use of adaptive equipment encouraged  Medication Review/Management:   medications reviewed   high risk medications identified  Intervention: Promote Injury-Free Environment  Flowsheets (Taken 10/17/2020 1513)  Safety Promotion/Fall Prevention:   side rails raised x 2   bed alarm set   assistive device/personal item within reach   diversional activities provided   Fall Risk reviewed with patient/family   Fall Risk signage in place   high risk medications identified   lighting adjusted   medications reviewed   nonskid shoes/socks when out of bed   instructed to call staff for mobility  Environmental Safety Modification:   assistive device/personal items within reach   clutter free environment maintained   lighting adjusted   room organization consistent     Problem: Adult Inpatient Plan of Care  Goal: Plan of Care Review  Outcome: Adequate for Care Transition  Flowsheets (Taken 10/17/2020 1513)  Plan of Care Reviewed With: patient  Goal: Patient-Specific Goal (Individualization)  Outcome: Adequate for Care Transition  Goal: Absence of Hospital-Acquired Illness or Injury  Outcome: Adequate for Care Transition  Intervention: Identify and Manage Fall  Risk  Flowsheets (Taken 10/17/2020 1513)  Safety Promotion/Fall Prevention:   side rails raised x 2   bed alarm set   assistive device/personal item within reach   diversional activities provided   Fall Risk reviewed with patient/family   Fall Risk signage in place   high risk medications identified   lighting adjusted   medications reviewed   nonskid shoes/socks when out of bed   instructed to call staff for mobility  Intervention: Prevent VTE (venous thromboembolism)  Flowsheets (Taken 10/17/2020 1513)  VTE Prevention/Management:   bleeding precautions maintained   bleeding risk assessed   bleeding risk factor(s) identified, provider notified  Goal: Optimal Comfort and Wellbeing  Outcome: Adequate for Care Transition  Intervention: Provide Person-Centered Care  Flowsheets (Taken 10/17/2020 1513)  Trust Relationship/Rapport:   care explained   emotional support provided   choices provided   empathic listening provided   questions answered   thoughts/feelings acknowledged   reassurance provided   questions encouraged  Goal: Readiness for Transition of Care  Outcome: Adequate for Care Transition  Goal: Rounds/Family Conference  Outcome: Adequate for Care Transition     Problem: Bariatric Environmental Safety  Goal: Safety Maintained with Care  Outcome: Adequate for Care Transition     Problem: Diabetes Comorbidity  Goal: Blood Glucose Level Within Desired Range  Outcome: Adequate for Care Transition  Intervention: Maintain Glycemic Control  Flowsheets (Taken 10/17/2020 1513)  Glycemic Management: blood glucose monitoring     Problem: Wound  Goal: Optimal Wound Healing  Outcome: Adequate for Care Transition  Intervention: Promote Effective Wound Healing  Flowsheets (Taken 10/17/2020 1513)  Oral Nutrition Promotion: calorie dense foods provided  Sleep/Rest Enhancement:   regular sleep/rest pattern promoted   relaxation techniques promoted  Pain Management Interventions:   care clustered    diversional activity provided   quiet environment facilitated   relaxation techniques promoted     Problem: Skin Injury Risk Increased  Goal: Skin Health and Integrity  Outcome: Adequate for Care Transition  Intervention: Optimize Skin Protection  Flowsheets (Taken 10/17/2020 1513)  Pressure Reduction Techniques:   frequent weight shift encouraged   weight shift assistance provided  Pressure Reduction Devices:   heel offloading device utilized   pressure-redistributing mattress utilized   positioning supports utilized  Skin Protection:   adhesive use limited   electrode sites changed   incontinence pads utilized   tubing/devices free from skin contact  Head of Bed (HOB): HOB elevated  Intervention: Promote and Optimize Oral Intake  Flowsheets (Taken 10/17/2020 1513)  Oral Nutrition Promotion: calorie dense foods provided     Problem: Device-Related Complication Risk (Hemodialysis)  Goal: Safe, Effective Therapy Delivery  Outcome: Adequate for Care Transition  Intervention: Optimize Device Care and Function  Flowsheets (Taken 10/17/2020 1513)  Medication Review/Management:   medications reviewed   high risk medications identified     Problem: Hemodynamic Instability (Hemodialysis)  Goal: Vital Signs Remain in Desired Range  Outcome: Adequate for Care Transition  Intervention: Optimize Blood Flow  Flowsheets (Taken 10/17/2020 1513)  Medication Review/Management:   medications reviewed   high risk medications identified     Problem: Infection (Hemodialysis)  Goal: Absence of Infection Signs/Symptoms  Outcome: Adequate for Care Transition  Intervention: Prevent or Manage Infection  Flowsheets (Taken 10/17/2020 1513)  Infection Prevention:   visitors restricted/screened   rest/sleep promoted   single patient room provided   personal protective equipment utilized   equipment surfaces disinfected   environmental surveillance performed   cohorting utilized  Fever Reduction/Comfort Measures:    lightweight bedding   lightweight clothing  Infection Management: aseptic technique maintained     Problem: Infection  Goal: Infection Symptom Resolution  Outcome: Adequate for Care Transition  Intervention: Prevent or Manage Infection  Flowsheets (Taken 10/17/2020 1513)  Fever Reduction/Comfort Measures:   lightweight bedding   lightweight clothing  Infection Management: aseptic technique maintained  Isolation Precautions: protective environment maintained

## 2020-10-17 NOTE — ASSESSMENT & PLAN NOTE
Last Hemoglobin A1C 8.8 on 9/3  Home regimen: Home meds: levemir 45U BID, Novolog 15U TIDWM    Plan:   - Hold home meds   - Glucose goals while inpatient 140-180s  - Glucose checks q6   - 18 units bid + 12 Us TID WM with LDSS >> Adjust as needed   - Diet: Diabetic/Renal   - She needs a follow up with Endocrinology, diabetic diet education   - discharge on home regimen

## 2020-10-17 NOTE — SUBJECTIVE & OBJECTIVE
Interval History: NAEON. Reports foot pain that shoots up her calf. She wants to go home today.    Review of Systems   Constitutional: Positive for activity change and fatigue. Negative for appetite change, chills and fever.   HENT: Negative for congestion, sore throat and trouble swallowing.    Eyes: Negative for visual disturbance.   Respiratory: Negative for cough, choking, shortness of breath and wheezing.    Cardiovascular: Positive for leg swelling. Negative for chest pain and palpitations.   Gastrointestinal: Negative for abdominal distention, blood in stool, constipation, diarrhea, nausea and vomiting.   Genitourinary: Negative for dysuria.   Musculoskeletal: Positive for gait problem. Negative for back pain.   Skin: Positive for color change and wound (Right leg ).   Neurological: Negative for light-headedness and headaches.   Psychiatric/Behavioral: Negative for agitation.     Objective:     Vital Signs (Most Recent):  Temp: 97.9 °F (36.6 °C) (10/17/20 0732)  Pulse: 66 (10/17/20 0732)  Resp: 18 (10/17/20 0732)  BP: (!) 107/59 (10/17/20 0732)  SpO2: 96 % (10/17/20 0732) Vital Signs (24h Range):  Temp:  [97.5 °F (36.4 °C)-100.3 °F (37.9 °C)] 97.9 °F (36.6 °C)  Pulse:  [66-91] 66  Resp:  [16-20] 18  SpO2:  [88 %-96 %] 96 %  BP: (107-192)/(54-98) 107/59     Weight: 128.8 kg (284 lb)  Body mass index is 48.75 kg/m².    Intake/Output Summary (Last 24 hours) at 10/17/2020 1001  Last data filed at 10/16/2020 1057  Gross per 24 hour   Intake 600 ml   Output 2600 ml   Net -2000 ml      Physical Exam  Constitutional:       General: She is not in acute distress.     Appearance: She is obese. She is not ill-appearing.   HENT:      Head: Normocephalic and atraumatic.      Mouth/Throat:      Mouth: Mucous membranes are moist.      Pharynx: Oropharynx is clear.   Eyes:      Conjunctiva/sclera: Conjunctivae normal.      Pupils: Pupils are equal, round, and reactive to light.   Cardiovascular:      Rate and Rhythm: Normal  rate.      Pulses: Normal pulses.   Pulmonary:      Effort: Pulmonary effort is normal. No respiratory distress.      Breath sounds: No wheezing.   Abdominal:      General: Bowel sounds are normal. There is no distension.      Palpations: Abdomen is soft.      Tenderness: There is no abdominal tenderness.   Musculoskeletal: Normal range of motion.         General: Deformity (RLE) present.      Right lower leg: Edema present.      Left lower leg: No edema.   Skin:     General: Skin is warm and dry.      Comments: S/p toe amputation   Venous stasis also noted in RLE     Neurological:      General: No focal deficit present.      Mental Status: She is alert and oriented to person, place, and time.   Psychiatric:         Mood and Affect: Mood normal.         Behavior: Behavior normal.         Significant Labs:   CBC:   Recent Labs   Lab 10/16/20  0409 10/17/20  0654   WBC 9.18 9.03   HGB 10.0* 9.0*   HCT 33.5* 30.6*    284     CMP:   Recent Labs   Lab 10/16/20  0409 10/17/20  0510   * 137   K 4.5 4.7   CL 97 98   CO2 19* 23   * 224*   BUN 55* 40*   CREATININE 8.7* 6.7*   CALCIUM 8.5* 8.7   PROT 7.0 7.3   ALBUMIN 2.8* 2.8*   BILITOT 0.4 0.4   ALKPHOS 67 65   AST 20 28   ALT 29 28   ANIONGAP 17* 16   EGFRNONAA 4.3* 5.9*     Magnesium:   Recent Labs   Lab 10/16/20  0409 10/17/20  0510   MG 2.1 2.0     Recent Labs     10/17/20  0510   PHOS 6.3*         Significant Imaging: I have reviewed all pertinent imaging results/findings within the past 24 hours.

## 2020-10-17 NOTE — ASSESSMENT & PLAN NOTE
Ms. King is a 63 yo F with T2DM, ESRD on HD (MWF), HFpEF (EF 50-55%), HTN, HLD, COPD, T2DM, chronic venous insufficiency, and anxiety/depression that presents for worsening R foot wound. She has multiple admission recently, for same complains, treated with course of Vanc and cefepime (stopped 9/24) she is s/p SFA stenting on 09/17/2020 for a completely occluded SFA.     Fever, worsen pain in the right toe, foul smell. likely gangrene of the right foot.   She was seen by podiatry on 10/1, wound debridement was done   Elevated inflammatory makers   Foot xray showed multiple fracture and possible superimposed infections     MRI:  1st digit.  posttraumatic or secondary to infection/osteomyelitis.  Visualization is limited. Small fractures of the distal 2nd, 3rd and 4th metatarsals with associated edema could be post-traumatic.    Ddx include gangrene, superimposed infection, osteomyelitis    Lactate normalized    Aerobic culture growing presumptive rpoteus species  Plan:       - Podiatry consulted appreciate recs, will hold on vascular consult at this time as Podiatry planning for amputation of the right toe  - Follow Blood Cx  - resume plavix  - ID for help with antibiotics  - 2g cefepime for broad spectrum coverage MWF after dialysis - close follow up with ID  - follow up with ID, Podiatry

## 2020-10-17 NOTE — PROGRESS NOTES
Pharmacokinetic Assessment Follow Up: IV Vancomycin    Vancomycin serum concentration assessment(s):    - Patient received Vancomycin 750 mg x1 yesterday after dialysis. AM level resulted at 25.5 mcg/mL. Concern for vancomycin accumulation in this patient given her BMI.   - Patient is ESRD on HD MWF  - Goal trough 15-20 mcg/mL    Vancomycin Regimen Plan:    - Continue to hold Vancomycin  - For discharge, would recommend Vancomycin 500 mg IV QMWF after dialysis. Would obtain a pre-dialysis level before Wednesday's dialysis session on 10/21 to help guide dosing.      Drug levels (last 3 results):  Recent Labs   Lab Result Units 10/15/20  1756 10/16/20  0409 10/17/20  0510   Vancomycin, Random ug/mL 20.3 18.4 25.5       Pharmacy will continue to follow and monitor vancomycin.    Please contact pharmacy at extension [phone] for questions regarding this assessment.    Thank you for the consult,   Julia Yeondam Roh       Patient brief summary:  Kylie King is a 65 y.o. female initiated on antimicrobial therapy with IV Vancomycin for treatment of bone/joint infection    Drug Allergies:   Review of patient's allergies indicates:   Allergen Reactions    Cyclobenzaprine Hallucinations    Erythromycin      Stomach upset       Actual Body Weight:   128.8 kg     Renal Function:   Estimated Creatinine Clearance: 11 mL/min (A) (based on SCr of 6.7 mg/dL (H)).,     Dialysis Method (if applicable):  intermittent HD MWF    CBC (last 72 hours):  Recent Labs   Lab Result Units 10/15/20  0426 10/16/20  0409 10/17/20  0654   WBC K/uL 7.80 9.18 9.03   Hemoglobin g/dL 9.7* 10.0* 9.0*   Hematocrit % 32.5* 33.5* 30.6*   Platelets K/uL 272 284 284   Gran% % 71.7 73.3* 70.9   Lymph% % 15.4* 15.1* 16.5*   Mono% % 9.7 8.6 9.3   Eosinophil% % 1.8 1.6 1.6   Basophil% % 0.6 0.4 0.8   Differential Method  Automated Automated Automated       Metabolic Panel (last 72 hours):  Recent Labs   Lab Result Units 10/15/20  0426 10/16/20  0409  10/17/20  0510   Sodium mmol/L 133* 133* 137   Potassium mmol/L 4.5 4.5 4.7   Chloride mmol/L 97 97 98   CO2 mmol/L 21* 19* 23   Glucose mg/dL 279* 286* 224*   BUN, Bld mg/dL 43* 55* 40*   Creatinine mg/dL 6.8* 8.7* 6.7*   Albumin g/dL 2.8* 2.8* 2.8*   Total Bilirubin mg/dL 0.4 0.4 0.4   Alkaline Phosphatase U/L 71 67 65   AST U/L 21 20 28   ALT U/L 28 29 28   Magnesium mg/dL 2.1 2.1 2.0   Phosphorus mg/dL 6.7* 7.4* 6.3*       Vancomycin Administrations:  vancomycin given in the last 96 hours                   vancomycin 750 mg in dextrose 5 % 250 mL IVPB (ready to mix system) ()  Restarted 10/16/20 2307    vancomycin 2 g in dextrose 5 % 500 mL IVPB (mg) 2,000 mg New Bag 10/13/20 1839                Microbiologic Results:  Microbiology Results (last 7 days)     Procedure Component Value Units Date/Time    AFB Culture & Smear [[id number]] Collected: 10/15/20 1516    Order Status: Completed Specimen: Bone from Toe, Right Foot Updated: 10/16/20 2127     AFB Culture & Smear Culture in progress     AFB CULTURE STAIN No acid fast bacilli seen.    Narrative:      First metatarsal    Blood culture #1 **CANNOT BE ORDERED STAT** [[id number]] Collected: 10/13/20 1708    Order Status: Completed Specimen: Blood from Peripheral, Forearm, Left Updated: 10/16/20 1822     Blood Culture, Routine No Growth to date      No Growth to date      No Growth to date      No Growth to date    Blood culture #2 **CANNOT BE ORDERED STAT** [[id number]] Collected: 10/13/20 1716    Order Status: Completed Specimen: Blood from Peripheral, Antecubital, Left Updated: 10/16/20 1822     Blood Culture, Routine No Growth to date      No Growth to date      No Growth to date      No Growth to date    Culture, Anaerobe [[id number]] Collected: 10/15/20 1516    Order Status: Completed Specimen: Bone from Toe, Right Foot Updated: 10/16/20 1338     Anaerobic Culture Culture in progress    Narrative:      First metatarsal    Aerobic culture [[id number]] Collected:  10/15/20 1516    Order Status: Completed Specimen: Bone from Toe, Right Foot Updated: 10/16/20 0754     Aerobic Bacterial Culture No growth    Narrative:      First metatarsal    Gram stain [[id number]] Collected: 10/15/20 1516    Order Status: Completed Specimen: Bone from Toe, Right Foot Updated: 10/15/20 2118     Gram Stain Result Rare WBC's      No organisms seen    Narrative:      First metatarsal    Clostridium difficile EIA [[id number]]     Order Status: Canceled Specimen: Stool     Fungus culture [[id number]] Collected: 10/15/20 1516    Order Status: Sent Specimen: Bone from Toe, Right Foot Updated: 10/15/20 6582

## 2020-10-17 NOTE — PLAN OF CARE
Problem: Fall Injury Risk  Goal: Absence of Fall and Fall-Related Injury  Outcome: Ongoing, Progressing     Problem: Adult Inpatient Plan of Care  Goal: Plan of Care Review  Outcome: Ongoing, Progressing  Goal: Patient-Specific Goal (Individualization)  Outcome: Ongoing, Progressing  Goal: Absence of Hospital-Acquired Illness or Injury  Outcome: Ongoing, Progressing  Goal: Optimal Comfort and Wellbeing  Outcome: Ongoing, Progressing  Goal: Readiness for Transition of Care  Outcome: Ongoing, Progressing  Goal: Rounds/Family Conference  Outcome: Ongoing, Progressing   Pt AAOx4. POC reviewed with patient. Vitals stable. Low grade fever noted at beginning of shift. Medications given as ordered. Tolerated well. New IV started in left shoulder. Flushes well. Hydrocodone given for pain. Will continue to monitor.

## 2020-10-17 NOTE — ASSESSMENT & PLAN NOTE
Blood work for prolactin and thyroid function  Start cabefgoline 0.5 mg once a week  Come back here in 4 months with blood test.   HD MWF  Last session was 10/16    Consult Nephrology for dialysis while in hospital   Resume outpatient dialysis when discharged

## 2020-10-17 NOTE — PROGRESS NOTES
Spoke with MD Pt states she told team she did not want home health and she is currently sitting in a wheelchair in hallway saying she is ready to go home. MD instructed to ask patient to wait until they come see her which would be in about 10-15 minutes. Pt was told as instructed and stated she would  Give 10-15 minutes but then she was leaving she is tired of being in hospital room.

## 2020-10-17 NOTE — ASSESSMENT & PLAN NOTE
Hb stable    Plan:   - Follow iron panel, folate and b12   - Daily CBC   - Transfuse for Hb < 7

## 2020-10-17 NOTE — DISCHARGE SUMMARY
"[facility] Hospital Medicine  Discharge Summary      Patient Name: Kylie King  MRN: [id number]  Admission Date: 10/13/2020  Hospital Length of Stay: 4 days  Discharge Date and Time:  10/17/2020 3:28 PM  Attending Physician: Gilles Hogue MD   Discharging Provider: Trent Lopez MD  Primary Care Provider: Virginia Foote MD  [place] Medicine [place] [facility] 5 Trent Lopez MD    HPI:   Ms. King is a 65 yo F with T2DM, ESRD on HD (MWF), HFpEF (EF 50-55%), HTN, HLD, COPD, T2DM, chronic venous insufficiency, and anxiety/depression that presents for worsening R foot wound. She has multiple admission recently, for same complains, treated with course of Vanc and cefepime (stopped 9/24) she is s/p SFA stenting on 09/17/2020  for a completely occluded SFA on aspirin and plavix. She reports for the past 3 weeks her symptoms worsening, her foot was continuing to hurt to the point prompt her the visit the ED. The past few days she has noticed some foul-smelling discharge from the foot and has had fever at home with T-max of 101.8° F. She endorses an associated "burning sensation in that foot.  She has chronic neuropathy but denies any new numbness, weakness or calf swelling.  She was fully dialyzed yesterday. Another ROS negatives. She lives with a roommate. Report compliance with her medications and dialysis. She smokes cigarettes occasionally, denied alcohol drinking or IVDA.      Procedure(s) (LRB):  PARTIAL RAY AMPUTATION GREAT TOE (Right)      Hospital Course:   65 year old female with chronic osteomyelitis of the right foot comes in with worsening wound, fevers and increased pain. Podiatry was consulted and the toe was amputated. Cultures preliminarily gre proteus species. ID consulted, recommend IV Cefepime 2g after dialysis MWF with close follow up. PT/OT recommend home health but patient refused. Will have close follow up with ID and podiatry. Referral to outpt PT made. "     Objective:     Vital Signs (Most Recent):   Temp: 97.9 °F (36.6 °C) (10/17/20 0732)   Pulse: 66 (10/17/20 0732)   Resp: 18 (10/17/20 0732)   BP: (!) 107/59 (10/17/20 0732)   SpO2: 96 % (10/17/20 0732) Vital Signs (24h Range):   Temp: [97.5 °F (36.4 °C)-100.3 °F (37.9 °C)] 97.9 °F (36.6 °C)   Pulse: [66-91] 66   Resp: [16-20] 18   SpO2: [88 %-96 %] 96 %   BP: (107-192)/(54-98) 107/59   Weight: 128.8 kg (284 lb)   Body mass index is 48.75 kg/m².     Intake/Output Summary (Last 24 hours) at 10/17/2020 1001   Last data filed at 10/16/2020 1057       Gross per 24 hour   Intake 600 ml   Output 2600 ml   Net -2000 ml     Physical Exam   Constitutional:   General: She is not in acute distress.  Appearance: She is obese. She is not ill-appearing.   HENT:   Head: Normocephalic and atraumatic.   Mouth/Throat:   Mouth: Mucous membranes are moist.   Pharynx: Oropharynx is clear.   Eyes:   Conjunctiva/sclera: Conjunctivae normal.   Pupils: Pupils are equal, round, and reactive to light.   Cardiovascular:   Rate and Rhythm: Normal rate.   Pulses: Normal pulses.   Pulmonary:   Effort: Pulmonary effort is normal. No respiratory distress.   Breath sounds: No wheezing.   Abdominal:   General: Bowel sounds are normal. There is no distension.   Palpations: Abdomen is soft.   Tenderness: There is no abdominal tenderness.   Musculoskeletal: Normal range of motion.   General: Deformity (RLE) present.   Right lower leg: Edema present.   Left lower leg: No edema.   Skin:   General: Skin is warm and dry.   Comments: S/p toe amputation  Venous stasis also noted in RLE    Neurological:   General: No focal deficit present.   Mental Status: She is alert and oriented to person, place, and time.   Psychiatric:   Mood and Affect: Mood normal.   Behavior: Behavior normal.        Consults:   Consults (From admission, onward)        Status Ordering Provider     Inpatient consult to Infectious Diseases  Once     Provider:  (Not yet assigned)     Completed ENEDELIA WHITNEY     Inpatient consult to Interventional Radiology  Once     Provider:  (Not yet assigned)    Completed ENEDELIA WHITNEY     Inpatient consult to Nephrology  Once     Provider:  (Not yet assigned)    Completed JACQUELYN SEN     Inpatient consult to Podiatry  Once     Provider:  (Not yet assigned)    Completed MIKEL CHEEMA     Pharmacy to dose Vancomycin consult  Once     Provider:  (Not yet assigned)    Acknowledged ENEDELIA WHITNEY          * Right toe wound infection   Ms. King is a 65 yo F with T2DM, ESRD on HD (MWF), HFpEF (EF 50-55%), HTN, HLD, COPD, T2DM, chronic venous insufficiency, and anxiety/depression that presents for worsening R foot wound. She has multiple admission recently, for same complains, treated with course of Vanc and cefepime (stopped 9/24) she is s/p SFA stenting on 09/17/2020 for a completely occluded SFA.     Fever, worsen pain in the right toe, foul smell. likely gangrene of the right foot.   She was seen by podiatry on 10/1, wound debridement was done   Elevated inflammatory makers   Foot xray showed multiple fracture and possible superimposed infections     MRI:  1st digit.  posttraumatic or secondary to infection/osteomyelitis.  Visualization is limited. Small fractures of the distal 2nd, 3rd and 4th metatarsals with associated edema could be post-traumatic.    Ddx include gangrene, superimposed infection, osteomyelitis    Lactate normalized    Aerobic culture growing presumptive rpoteus species  Plan:       - Podiatry consulted appreciate recs, will hold on vascular consult at this time as Podiatry planning for amputation of the right toe  - Follow Blood Cx  - resume plavix  - ID for help with antibiotics  - 2g cefepime for broad spectrum coverage MWF after dialysis - close follow up with ID  - follow up with ID, Podiatry      Hyponatremia  NA WNL  Improving with dialysis  Hypervolemic Hyponatremia  -1.5L fluid  restrictions  Resolved    Hyperphosphatemia  Begin sevelemer with meals per nephrology recs      Normocytic anemia  Hb stable    Plan:   - Follow iron panel, folate and b12   - Daily CBC   - Transfuse for Hb < 7         Depression  Resume home Citalopram 20 mg QHS       ESRD (end stage renal disease) on dialysis  HD MWF  Last session was 10/16    Consult Nephrology for dialysis while in hospital   Resume outpatient dialysis when discharged    Debility  PT/OT   Home health OT, PT    Morbid obesity  BMI:   Comorbidities include: DM, HTN, ESRD    Plan:   - The goal of therapy is to prevent, treat or reverse to complications of obesity and improve quality of life  - Patient would benefit from comprehensive life style modifications   - Weight loss education   - Encourage exercise   - Dietary modifications Consider consult Dietician for weight loss diet education, appreciate recs   - Consider referral to Bariatric medicine/surgery         DDD (degenerative disc disease), lumbar  Continue home Gabapentin     Diabetic peripheral neuropathy associated with type 2 diabetes mellitus  Continue home Gabapentin     Type 2 diabetes mellitus  Last Hemoglobin A1C 8.8 on 9/3  Home regimen: Home meds: levemir 45U BID, Novolog 15U TIDWM    Plan:   - Hold home meds   - Glucose goals while inpatient 140-180s  - Glucose checks q6   - 18 units bid + 12 Us TID WM with LDSS >> Adjust as needed   - Diet: Diabetic/Renal   - She needs a follow up with Endocrinology, diabetic diet education   - discharge on home regimen        Hypercholesterolemia  Continue home statin       Essential hypertension  Hypertensive on arrival  Continue home amlodipine 10mg, coreg 12.5mg BID (has been taking 25mg daily), hydralazine 25mg daily      Final Active Diagnoses:    Diagnosis Date Noted POA    PRINCIPAL PROBLEM:  Right toe wound infection  [L08.9] 10/13/2020 Yes    Dry gangrene [I96]  Yes    Hyperphosphatemia [E83.39] 10/14/2020 Unknown    Hyponatremia  "[E87.1] 10/14/2020 Unknown    Normocytic anemia [D64.9] 10/13/2020 Unknown    Gangrene of toe of right foot [I96] 09/03/2020 Yes    Depression [F32.9] 02/14/2019 Yes    Anemia in ESRD (end-stage renal disease) [N18.6, D63.1] 02/14/2019 Yes    ESRD (end stage renal disease) on dialysis [N18.6, Z99.2]  Not Applicable    Heart failure, diastolic, chronic [I50.32] 04/23/2018 Yes    Debility [R53.81] 04/13/2017 Yes    Morbid obesity [E66.01] 03/31/2017 Yes    DDD (degenerative disc disease), lumbar [M51.36] 11/23/2015 Yes    Lumbar stenosis [M48.061] 11/23/2015 Yes    History of breast cancer [Z85.3] 03/21/2013 Not Applicable    Diabetic peripheral neuropathy associated with type 2 diabetes mellitus [E11.42] 12/24/2012 Yes    Type 2 diabetes mellitus [E11.9] 12/22/2012 Yes    Essential hypertension [I10] 12/21/2012 Yes    Hypercholesterolemia [E78.00] 12/21/2012 Yes      Problems Resolved During this Admission:       Discharged Condition: good    [place] [facility]    [place] Up:  Follow-up Information     Tuan Thorne MD On 10/20/2020.    Specialty: Internal Medicine  Why: scheduled @1000  Contact information:  [address] [facility] [address]  [phone]                 Patient Instructions:      WHEELCHAIR FOR HOME USE     Order Specific Question Answer Comments   Hours in W/C per day: 2    Type of Wheelchair: Standard    Size(Width): 16"(small adult)    Leg Support: STD footrests    Lap Belt: Velcro    Cushion: Basic    Height: 5' 4" (1.626 m)    Weight: 128.8 kg (284 lb)    Does patient have medical equipment at home? wheelchair Patient has a stool for her shower; Her wheelchair is way too big and patient is unable to use it appropriately. / Patient has a stool for her shower; Her wheelchair is way too big and patient is unable to use it appropriately. / Patient has a stool for    Does patient have medical equipment at home? rollator    Does patient have medical equipment at " home? grab bar    Length of need (1-99 months): 99    Please check all that apply: Patient's upper body strength is sufficient for propulsion.    Please check all that apply: Caregiver is capable and willing to operate wheelchair safely.    Please check all that apply: The patient requires the use of a w/c for activities of daily living within the Home.    Please check all that apply: The patient has a cast, brace or muscloskeletal condition which prevents 90 degree flexion of the knee.    Please check all that apply: Patient mobility limitations cannot be sufficiently resolved by the use of other ambulatory therapies.      Ambulatory referral/consult to Physical/Occupational Therapy   Standing Status: Future   Referral Priority: Routine Referral Type: Physical Medicine   Referral Reason: Specialty Services Required   Number of Visits Requested: 1     Home Sleep Studies   Standing Status: Future Standing Exp. Date: 10/15/21       Significant Diagnostic Studies: Labs:   CMP   Recent Labs   Lab 10/16/20  0409 10/17/20  0510   * 137   K 4.5 4.7   CL 97 98   CO2 19* 23   * 224*   BUN 55* 40*   CREATININE 8.7* 6.7*   CALCIUM 8.5* 8.7   PROT 7.0 7.3   ALBUMIN 2.8* 2.8*   BILITOT 0.4 0.4   ALKPHOS 67 65   AST 20 28   ALT 29 28   ANIONGAP 17* 16   ESTGFRAFRICA 5.0* 6.8*   EGFRNONAA 4.3* 5.9*    and CBC   Recent Labs   Lab 10/16/20  0409 10/17/20  0654   WBC 9.18 9.03   HGB 10.0* 9.0*   HCT 33.5* 30.6*    284     Microbiology: Wound Culture: positive    Pending Diagnostic Studies:     Procedure Component Value Units Date/Time    Specimen to Pathology, Surgery Other [[id number]] Collected: 10/15/20 6819    Order Status: Sent Lab Status: In process Updated: 10/15/20 4592         Medications:  Reconciled Home Medications:      Medication List      START taking these medications    ceFEPIme 2 gram injection  Commonly known as: MAXIPIME  Inject 2 g into the vein every Mon, Wed, Fri. AFTER DIALYSIS for 14  days  Start taking on: October 19, 2020     sevelamer carbonate 800 mg Tab  Commonly known as: RENVELA  Take 1 tablet (800 mg total) by mouth 3 (three) times daily with meals.        CHANGE how you take these medications    amLODIPine 10 MG tablet  Commonly known as: NORVASC  Take 1 tablet (10 mg total) by mouth every morning.  What changed: when to take this     fluticasone propionate 50 mcg/actuation nasal spray  Commonly known as: FLONASE  2 sprays (100 mcg total) by Each Nare route once daily.  What changed:   · when to take this  · reasons to take this     * HumaLOG KwikPen Insulin 100 unit/mL pen  Generic drug: insulin lispro  BLOOD GLUCOSE 150-200, INJECT 1 UNITS UNDER THE SKIN, 201-250, 2 UNITS, 251-300, 3 UNITS THREE TIMES A DAY AS DIRECTED  What changed: Another medication with the same name was changed. Make sure you understand how and when to take each.     * insulin lispro 100 unit/mL pen  Commonly known as: HumaLOG KwikPen Insulin  BLOOD GLUCOSE 150-200, INJECT 1 UNITS UNDER THE SKIN, 201-250, 2 UNITS, 251-300, 3 UNITS THREE TIMES A DAY AS DIRECTED  What changed:   · how much to take  · how to take this  · when to take this  · additional instructions         * This list has 2 medication(s) that are the same as other medications prescribed for you. Read the directions carefully, and ask your doctor or other care provider to review them with you.            CONTINUE taking these medications    acetaminophen 325 MG tablet  Commonly known as: TYLENOL  Take 650 mg by mouth every 6 (six) hours as needed for mild pain 1-3/10 pain scale.     albuterol 90 mcg/actuation inhaler  Commonly known as: PROVENTIL/VENTOLIN HFA  Inhale 2 puffs into the lungs every 6 (six) hours as needed for Wheezing. Rescue     ammonium lactate 12 % Crea  Apply twice daily to affected parts both feet as needed.     aspirin 81 MG EC tablet  Commonly known as: ECOTRIN  Take 1 tablet (81 mg total) by mouth once daily.     atorvastatin 40  "MG tablet  Commonly known as: LIPITOR  Take 1 tablet (40 mg total) by mouth once daily.     * BD ULTRA-FINE MINI PEN NEEDLE 31 gauge x 3/16" Ndle  Generic drug: pen needle, diabetic  USE 5 NEEDLES PER DAY     * pen needle, diabetic 32 gauge x 5/32" Ndle  1 Device by Misc.(Non-Drug; Combo Route) route 5 (five) times daily.     carvediloL 25 MG tablet  Commonly known as: COREG  Take 1 tablet (25 mg total) by mouth 2 (two) times daily.     citalopram 20 MG tablet  Commonly known as: CELEXA  Take 1 tablet (20 mg total) by mouth nightly.     clopidogreL 75 mg tablet  Commonly known as: PLAVIX  Take 1 tablet (75 mg total) by mouth once daily.     diclofenac sodium 1 % Gel  Commonly known as: VOLTAREN  Apply 2 grams topically 4 (four) times daily.     famotidine 20 MG tablet  Commonly known as: PEPCID  Take 1 tablet (20 mg total) by mouth nightly as needed.     fish oil-omega-3 fatty acids 300-1,000 mg capsule  Take 1 capsule by mouth every morning.     gabapentin 400 MG capsule  Commonly known as: NEURONTIN  Take 1 capsule (400 mg total) by mouth every evening.     hydrALAZINE 25 MG tablet  Commonly known as: APRESOLINE  Take 1 tablet (25 mg total) by mouth 2 (two) times daily.     insulin detemir U-100 100 unit/mL injection  Commonly known as: LEVEMIR  Inject 45 Units into the skin 2 (two) times daily.     insulin needles (disposable) 31 Ndle  Commonly known as: PEN NEEDLE, DIABETIC  Patient needs 5 needles a day     ketoconazole 2 % cream  Commonly known as: NIZORAL  Apply topically once daily.     lancets 33 gauge Misc  Commonly known as: ONETOUCH DELICA LANCETS  1 lancet by Misc.(Non-Drug; Combo Route) route 4 (four) times daily before meals and nightly.     letrozole 2.5 mg Tab  Commonly known as: FEMARA  Take 1 tablet (2.5 mg total) by mouth nightly.     MEDIHONEY (HONEY) 80 % Gel  Generic drug: honey  Apply 1 application topically 3 (three) times daily.         * This list has 2 medication(s) that are the same as " other medications prescribed for you. Read the directions carefully, and ask your doctor or other care provider to review them with you.            STOP taking these medications    acetaminophen-codeine 300-30mg 300-30 mg Tab  Commonly known as: TYLENOL #3            Indwelling Lines/Drains at time of discharge:   Lines/Drains/Airways     Drain                 Hemodialysis AV Fistula Right forearm -- days         Hemodialysis AV Fistula Right forearm -- days         Hemodialysis AV Fistula 05/28/18 1436  873 days         Open Drain 10/15/20 1453 Right;Medial Foot Penrose 1/4 inch 2 days                Time spent on the discharge of patient: 35 minutes  Patient was seen and examined on the date of discharge and determined to be suitable for discharge.         Trent Lopez MD  Department of Hospital Medicine  [facility]

## 2020-10-17 NOTE — PROGRESS NOTES
Patient confirmed that she did not want home health services. Podiatry appointments to be scheduled weekly X4 weeks. Copy of Discharge summary given to patient to present to HD clinic. CM contact information given as a resource to patient .    Patient discharged via wheelchair with spouse..

## 2020-10-17 NOTE — PROGRESS NOTES
Spoke with MD pt /59, MD has BP medication Coreg and Hydralazine and MD ordered hold on BP medication.

## 2020-10-18 LAB
BACTERIA BLD CULT: NORMAL
BACTERIA BLD CULT: NORMAL

## 2020-10-18 NOTE — SUBJECTIVE & OBJECTIVE
Interval History:  No acute events overnight. Afebrile.  No leukocytosis.  Pain overall controlled.  Shooting pain at night.   Cultures showing Proteus species.   Very anxious to go home.     Review of Systems   Constitutional: Positive for activity change and fatigue. Negative for appetite change, chills, diaphoresis and fever.   HENT: Negative.    Respiratory: Negative for cough, choking, shortness of breath and wheezing.    Cardiovascular: Positive for leg swelling. Negative for chest pain and palpitations.   Gastrointestinal: Negative for abdominal distention, blood in stool, constipation, diarrhea, nausea and vomiting.   Genitourinary: Negative for dysuria.        HD - still makes small amount urine   Musculoskeletal: Positive for gait problem. Negative for back pain.   Skin: Positive for color change and wound (Right leg ).   Neurological: Negative for dizziness and headaches.   Psychiatric/Behavioral: Negative for agitation and confusion.     Objective:     Vital Signs (Most Recent):  Temp: 98.1 °F (36.7 °C) (10/17/20 1152)  Pulse: 71 (10/17/20 1152)  Resp: 18 (10/17/20 1152)  BP: 123/84 (10/17/20 1152)  SpO2: (!) 94 % (10/17/20 1152) Vital Signs (24h Range):  Temp:  [97.5 °F (36.4 °C)-98.3 °F (36.8 °C)] 98.1 °F (36.7 °C)  Pulse:  [66-72] 71  Resp:  [16-20] 18  SpO2:  [88 %-96 %] 94 %  BP: (107-133)/(54-84) 123/84     Weight: 128.8 kg (284 lb)  Body mass index is 48.75 kg/m².    Estimated Creatinine Clearance: 11 mL/min (A) (based on SCr of 6.7 mg/dL (H)).    Physical Exam  Vitals signs and nursing note reviewed.   Constitutional:       General: She is not in acute distress.     Appearance: She is obese. She is not ill-appearing, toxic-appearing or diaphoretic.   HENT:      Head: Normocephalic and atraumatic.      Mouth/Throat:      Mouth: Mucous membranes are moist.   Eyes:      General: No scleral icterus.     Conjunctiva/sclera: Conjunctivae normal.   Cardiovascular:      Rate and Rhythm: Normal rate and  regular rhythm.   Pulmonary:      Effort: Pulmonary effort is normal. No respiratory distress.      Breath sounds: No wheezing or rales.   Abdominal:      General: Bowel sounds are normal. There is no distension.      Palpations: Abdomen is soft.      Tenderness: There is no abdominal tenderness.   Musculoskeletal: Normal range of motion.         General: No deformity.      Right lower leg: Edema present.      Left lower leg: No edema.      Comments: Right foot with surgical dressing, c/d/i.  Decreased warmth and erythema above dressing.  Venous stasis changes to RLE     Skin:     General: Skin is warm and dry.   Neurological:      General: No focal deficit present.      Mental Status: She is alert and oriented to person, place, and time.   Psychiatric:         Mood and Affect: Mood normal.         Behavior: Behavior normal.         Significant Labs:   Blood Culture:   Recent Labs   Lab 09/03/20  2243 10/13/20  1708 10/13/20  1716   LABBLOO No growth after 5 days.  No growth after 5 days. No Growth to date  No Growth to date  No Growth to date  No Growth to date  No Growth to date No Growth to date  No Growth to date  No Growth to date  No Growth to date  No Growth to date     CBC:   Recent Labs   Lab 10/16/20  0409 10/17/20  0654   WBC 9.18 9.03   HGB 10.0* 9.0*   HCT 33.5* 30.6*    284     CMP:   Recent Labs   Lab 10/16/20  0409 10/17/20  0510   * 137   K 4.5 4.7   CL 97 98   CO2 19* 23   * 224*   BUN 55* 40*   CREATININE 8.7* 6.7*   CALCIUM 8.5* 8.7   PROT 7.0 7.3   ALBUMIN 2.8* 2.8*   BILITOT 0.4 0.4   ALKPHOS 67 65   AST 20 28   ALT 29 28   ANIONGAP 17* 16   EGFRNONAA 4.3* 5.9*     Pathology Results  (Last 10 years)    None        Wound Culture:   Recent Labs   Lab 10/15/20  1516   LABAERO PRESUMPTIVE PROTEUS SPECIES  Few  Identification and susceptibility pending  *       Significant Imaging: I have reviewed all pertinent imaging results/findings within the past 24 hours.

## 2020-10-18 NOTE — PROGRESS NOTES
[facility]  Infectious Disease  Progress Note    Patient Name: Kylie King  MRN: [id number]  Admission Date: 10/13/2020  Length of Stay: 4 days  Attending Physician: No att. providers found  Primary Care Provider: Virginia Foote MD    Isolation Status: No active isolations  Assessment/Plan:      Gangrene of toe of right foot     65 year old woman with DM, ESRD on HD, HTN, COPD, HFpEF, AD, chronic venous insufficiency who presented 10/13 with worsening right foot wound/gangrene.  Recent admission early September with same complaint. She was treated with vancomycin and cefepime pending plan for vascular and surgical intervention.  Antibiotics were discontinued 9/24.  She apparently had SFA stent placed on 9/17.  She presented to Podiatry clinic 10/12 with worsening gangrene and worsening wound to dorsal right 2nd toe.  MRI concerning for osteomyelitis of right hallux.       She is now POD #2 partial right 1st ray amputation and arthroplasty and closure of the right 2nd toe.   I discussed with Podiatry who feels they removed all of the infected bone (evidence of osteo on MRI in distal phalanx).  No concern for osteo in the 2nd toe.  Clean margin bone cultures, however,  showing presumptive Proteus (susceptibilities pending).  Blood cultures NGTD. She is currently on IV vancomycin and cefepime.       Patient very anxious to go home and does not wish to wait for final culture results and sensitivities.  Afebrile, no leukocytosis.   Low grade At time of visit, patient is sitting up on edge of bed, eating a burger.  No complaints.  Diarrhea yesterday, but now resolved.     Plan/discharge recommendations:  1.  Discontinue IV vancomycin.   2.  Continue IV cefepime (to cover for proteus) 2 grams after each dialysis MWF.    3.  Will follow up culture sensitivities and de-escalate as outpatient if cefazolin sensitive   4.  Given growth on clean margins, anticipate 6 weeks of IV antibiotics from date of  surgery. Estimated end date 11/26/20  5.  Weekly cbc, cmp, crp, esr and fax results to ID, attention Coy Kc NP, at fax [phone]. (Pager [phone], spectra [phone], office [phone])  6.  ID follow up 10-14 days. ID will make the appointment. Will try to coordinate with Podiatry.  Will follow up path results at that time  7. Discussed plan with patient.  Gave her my contact information and advised to call with any concerns.     Discussed with Primary Team    Thank you.   Please call for any questions or concerns.  ISSAC Schwartz, ANP-C  [phone] pager, Spectra [phone]    Subjective:     Principal Problem:Toe infection    HPI: Ms. Kylie King is a 65 year old woman with DM, ESRD on HD, HTN, COPD, HFpEF, AD, chronic venous insufficiency who presented 10/13 with worsening right foot wound/gangrene.  Recent admission early September with same complaint. She was treated with vancomycin and cefepime pending plan for vascular and surgical intervention.  Antibiotics were discontinued 9/24.  She apparently had SFA stent placed on 9/17.  She presented to Podiatry clinic 10/12 with worsening gangrene and worsening wound to dorsal right 2nd toe.  MRI concerning for osteomyelitis of right hallux.  Afebrile, no leukocytosis. SFR656, ESR 85.  She is now POD #1 partial right 1st ray amputation and arthroplasty and closure of the right 2nd toe.  Bone cultures are pending.   Blood cultures NGTD. She is currently on IV vancomycin and cefepime.  ID consulted for antibiotic tailoring/recommendations.    Interval History:  No acute events overnight. Afebrile.  No leukocytosis.  Pain overall controlled.  Shooting pain at night.   Cultures showing Proteus species.   Very anxious to go home.     Review of Systems   Constitutional: Positive for activity change and fatigue. Negative for appetite change, chills, diaphoresis and fever.   HENT: Negative.    Respiratory: Negative for cough, choking, shortness of breath and wheezing.     Cardiovascular: Positive for leg swelling. Negative for chest pain and palpitations.   Gastrointestinal: Negative for abdominal distention, blood in stool, constipation, diarrhea, nausea and vomiting.   Genitourinary: Negative for dysuria.        HD - still makes small amount urine   Musculoskeletal: Positive for gait problem. Negative for back pain.   Skin: Positive for color change and wound (Right leg ).   Neurological: Negative for dizziness and headaches.   Psychiatric/Behavioral: Negative for agitation and confusion.     Objective:     Vital Signs (Most Recent):  Temp: 98.1 °F (36.7 °C) (10/17/20 1152)  Pulse: 71 (10/17/20 1152)  Resp: 18 (10/17/20 1152)  BP: 123/84 (10/17/20 1152)  SpO2: (!) 94 % (10/17/20 1152) Vital Signs (24h Range):  Temp:  [97.5 °F (36.4 °C)-98.3 °F (36.8 °C)] 98.1 °F (36.7 °C)  Pulse:  [66-72] 71  Resp:  [16-20] 18  SpO2:  [88 %-96 %] 94 %  BP: (107-133)/(54-84) 123/84     Weight: 128.8 kg (284 lb)  Body mass index is 48.75 kg/m².    Estimated Creatinine Clearance: 11 mL/min (A) (based on SCr of 6.7 mg/dL (H)).    Physical Exam  Vitals signs and nursing note reviewed.   Constitutional:       General: She is not in acute distress.     Appearance: She is obese. She is not ill-appearing, toxic-appearing or diaphoretic.   HENT:      Head: Normocephalic and atraumatic.      Mouth/Throat:      Mouth: Mucous membranes are moist.   Eyes:      General: No scleral icterus.     Conjunctiva/sclera: Conjunctivae normal.   Cardiovascular:      Rate and Rhythm: Normal rate and regular rhythm.   Pulmonary:      Effort: Pulmonary effort is normal. No respiratory distress.      Breath sounds: No wheezing or rales.   Abdominal:      General: Bowel sounds are normal. There is no distension.      Palpations: Abdomen is soft.      Tenderness: There is no abdominal tenderness.   Musculoskeletal: Normal range of motion.         General: No deformity.      Right lower leg: Edema present.      Left lower leg:  No edema.      Comments: Right foot with surgical dressing, c/d/i.  Decreased warmth and erythema above dressing.  Venous stasis changes to RLE     Skin:     General: Skin is warm and dry.   Neurological:      General: No focal deficit present.      Mental Status: She is alert and oriented to person, place, and time.   Psychiatric:         Mood and Affect: Mood normal.         Behavior: Behavior normal.         Significant Labs:   Blood Culture:   Recent Labs   Lab 09/03/20  2243 10/13/20  1708 10/13/20  1716   LABBLOO No growth after 5 days.  No growth after 5 days. No Growth to date  No Growth to date  No Growth to date  No Growth to date  No Growth to date No Growth to date  No Growth to date  No Growth to date  No Growth to date  No Growth to date     CBC:   Recent Labs   Lab 10/16/20  0409 10/17/20  0654   WBC 9.18 9.03   HGB 10.0* 9.0*   HCT 33.5* 30.6*    284     CMP:   Recent Labs   Lab 10/16/20  0409 10/17/20  0510   * 137   K 4.5 4.7   CL 97 98   CO2 19* 23   * 224*   BUN 55* 40*   CREATININE 8.7* 6.7*   CALCIUM 8.5* 8.7   PROT 7.0 7.3   ALBUMIN 2.8* 2.8*   BILITOT 0.4 0.4   ALKPHOS 67 65   AST 20 28   ALT 29 28   ANIONGAP 17* 16   EGFRNONAA 4.3* 5.9*     Pathology Results  (Last 10 years)    None        Wound Culture:   Recent Labs   Lab 10/15/20  1516   LABAERO PRESUMPTIVE PROTEUS SPECIES  Few  Identification and susceptibility pending  *       Significant Imaging: I have reviewed all pertinent imaging results/findings within the past 24 hours.

## 2020-10-18 NOTE — ASSESSMENT & PLAN NOTE
65 year old woman with DM, ESRD on HD, HTN, COPD, HFpEF, AD, chronic venous insufficiency who presented 10/13 with worsening right foot wound/gangrene.  Recent admission early September with same complaint. She was treated with vancomycin and cefepime pending plan for vascular and surgical intervention.  Antibiotics were discontinued 9/24.  She apparently had SFA stent placed on 9/17.  She presented to Podiatry clinic 10/12 with worsening gangrene and worsening wound to dorsal right 2nd toe.  MRI concerning for osteomyelitis of right hallux.       She is now POD #2 partial right 1st ray amputation and arthroplasty and closure of the right 2nd toe.   I discussed with Podiatry who feels they removed all of the infected bone (evidence of osteo on MRI in distal phalanx).  No concern for osteo in the 2nd toe.  Clean margin bone cultures, however,  showing presumptive Proteus (susceptibilities pending).  Blood cultures NGTD. She is currently on IV vancomycin and cefepime.       Patient very anxious to go home and does not wish to wait for final culture results and sensitivities.  Afebrile, no leukocytosis.   Low grade At time of visit, patient is sitting up on edge of bed, eating a burger.  No complaints.  Diarrhea yesterday, but now resolved.     Plan/discharge recommendations:  1.  Discontinue IV vancomycin.   2.  Continue IV cefepime (to cover for proteus) 2 grams after each dialysis MWF.    3.  Will follow up culture sensitivities and de-escalate as outpatient if cefazolin sensitive   4.  Given growth on clean margins, anticipate 6 weeks of IV antibiotics from date of surgery. Estimated end date 11/26/20  5.  Weekly cbc, cmp, crp, esr and fax results to ID, attention Coy Kc NP, at fax [phone]. (Pager [phone], spectra [phone], office [phone])  6.  ID follow up 10-14 days. ID will make the appointment. Will try to coordinate with Podiatry.  Will follow up path results at that time  7. Discussed plan with  patient.  Gave her my contact information and advised to call with any concerns.     Discussed with Primary Team

## 2020-10-19 ENCOUNTER — PATIENT OUTREACH (OUTPATIENT)
Dept: ADMINISTRATIVE | Facility: CLINIC | Age: 65
End: 2020-10-19

## 2020-10-19 DIAGNOSIS — L08.9 TOE INFECTION: Primary | ICD-10-CM

## 2020-10-19 DIAGNOSIS — Z79.2 RECEIVING INTRAVENOUS ANTIBIOTIC TREATMENT AS OUTPATIENT: ICD-10-CM

## 2020-10-19 NOTE — PROGRESS NOTES
Subjective:      Patient ID: Kylie King is a 65 y.o. female.    Chief Complaint: Diabetes Mellitus (Dary 10/8) and Foot Ulcer    Kylie is a 65 y.o. female who presents to the clinic for evaluation and treatment of high risk feet. Kylie has a past medical history of Anxiety, Arthritis, Breast cancer (1/2013), Carotid artery stenosis (8/13/2018), Cataract, Choledocholithiasis, Chronic obstructive pulmonary disease (6/8/2018), Chronic venous insufficiency, Colon cancer (2001), CRI (chronic renal insufficiency), Dialysis AV fistula malfunction, ESRD (end stage renal disease) on dialysis, Former smoker (6/8/2018), GERD (gastroesophageal reflux disease), History of acute pancreatitis (2009), History of cerebrovascular accident (6/8/2018), History of colon cancer, HTN (hypertension), benign, Hypercholesterolemia, Hyperlipidemia, Hypoxemia (6/8/2018), Intracerebral hemorrhage, Kidney stone, Lymphedema of both lower extremities, Obesity, Pancreatitis, Pancreatitis (2008), Physical deconditioning, Pulmonary edema, Sacroiliac joint pain, Spinal stenosis, Spinal stenosis, lumbar, Stroke (12/2012), Tobacco abuse, and Type 2 diabetes mellitus with neurological manifestations, controlled.This patient has documented high risk feet requiring routine maintenance secondary to diabetes mellitis and those secondary complications of diabetes, as mentioned..  She is following up after hospital discharge with dry gangrene of the right hallux as well as ulceration noted to the right foot which has worsened.  PCP: Virginia Foote MD    Date Last Seen by PCP:   Chief Complaint   Patient presents with    Diabetes Mellitus     Dary 10/8    Foot Ulcer         Current shoe gear:  Affected Foot: Extra depth shoes with custome accommodative insoles     Unaffected Foot: Extra depth shoes with custome accommodative insoles    Hemoglobin A1C   Date Value Ref Range Status   09/03/2020 8.8 (H) 4.0 - 5.6 % Final     Comment:     ADA  Screening Guidelines:  5.7-6.4%  Consistent with prediabetes  >or=6.5%  Consistent with diabetes  High levels of fetal hemoglobin interfere with the HbA1C  assay. Heterozygous hemoglobin variants (HbS, HgC, etc)do  not significantly interfere with this assay.   However, presence of multiple variants may affect accuracy.     03/05/2020 9.9 (H) 4.0 - 5.6 % Final     Comment:     ADA Screening Guidelines:  5.7-6.4%  Consistent with prediabetes  >or=6.5%  Consistent with diabetes  High levels of fetal hemoglobin interfere with the HbA1C  assay. Heterozygous hemoglobin variants (HbS, HgC, etc)do  not significantly interfere with this assay.   However, presence of multiple variants may affect accuracy.     11/21/2019 9.9 (H) 4.0 - 5.6 % Final     Comment:     ADA Screening Guidelines:  5.7-6.4%  Consistent with prediabetes  >or=6.5%  Consistent with diabetes  High levels of fetal hemoglobin interfere with the HbA1C  assay. Heterozygous hemoglobin variants (HbS, HgC, etc)do  not significantly interfere with this assay.   However, presence of multiple variants may affect accuracy.     10/09/2018 7.8 % Final       Review of Systems   Constitution: Negative for chills, decreased appetite, fever and night sweats.   HENT: Negative for congestion, ear discharge, nosebleeds and tinnitus.    Eyes: Negative for double vision, pain and visual disturbance.   Cardiovascular: Negative for chest pain, claudication, cyanosis and palpitations.   Respiratory: Negative for cough, hemoptysis, shortness of breath and wheezing.    Endocrine: Negative for cold intolerance and heat intolerance.   Hematologic/Lymphatic: Negative for adenopathy and bleeding problem.   Skin: Positive for color change, dry skin, nail changes and unusual hair distribution.   Musculoskeletal: Positive for arthritis, joint pain and stiffness. Negative for myalgias.   Gastrointestinal: Negative for abdominal pain, dysphagia, nausea and vomiting.   Genitourinary:  Negative for dysuria, flank pain, hematuria and pelvic pain.   Neurological: Positive for disturbances in coordination, numbness, paresthesias and sensory change.   Psychiatric/Behavioral: Negative for altered mental status, hallucinations and suicidal ideas. The patient does not have insomnia.    Allergic/Immunologic: Negative for environmental allergies and persistent infections.           Objective:      Physical Exam   Constitutional: She is oriented to person, place, and time. She appears well-developed and well-nourished.   HENT:   Head: Normocephalic and atraumatic.   Cardiovascular:   Pulses:       Dorsalis pedis pulses are 1+ on the right side, and 1+ on the left side.        Posterior tibial pulses are 1+ on the right side, and 1+ on the left side.   Pulmonary/Chest: Effort normal.   Musculoskeletal: Normal range of motion.   Anterior, lateral, and posterior muscle groups bilateral lower extremities show strength 4 over 5 symmetrically. Inspection and palpation of the joints and bones reveal no crepitus or joint effusion. No tenderness upon palpation. Mild plantar flexor contractures noted to digits 2 through 5 bilaterally.  Angle and base of gait are normal.    Left 2nd 3rd digit demonstrates increased flexion deformity compared other digits with distal hyperkeratotic changes to the toe.  Hyperkeratosis has sub hemorrhagic changes as well.   Feet:   Right Foot:   Skin Integrity: Positive for callus and dry skin.   Left Foot:   Skin Integrity: Positive for callus and dry skin.   Neurological: She is alert and oriented to person, place, and time. She displays atrophy and abnormal reflex. A sensory deficit is present.   Reflex Scores:       Patellar reflexes are 1+ on the right side and 1+ on the left side.       Achilles reflexes are 1+ on the right side and 1+ on the left side.  Sharp, dull, light touch, and vibratory sensation are diminished bilaterally. Proprioceptive sensation is intact to both lower  extremities. Cromwell Bonnie monofilament exam shows loss of protective sensation to plantar toes 1 through 5 bilaterally. Deep tendon reflexes to the patellar tendons is 1 over 4 bilaterally symmetrical. Deep tendon reflexes to the Achilles tendon is 0 over 4 bilaterally symmetrical. No ankle clonus or Babinski reflex noted bilaterally. Coordination is fair to both lower extremities.  Patient admits to intermittent burning and tingling in the feet.   Skin: Skin is warm and dry. Capillary refill takes 2 to 3 seconds. There is pallor.   Skin bilateral lower extremities noted to be thin, dry, and atrophic.  Gangrenous changes noted to the distal aspect of the right ft.  In addition 2 full-thickness ulceration through the fatty tissue layer in to the layer of the muscle.  This is noted to the dorsal medial aspect of the right forefoot.  The ulceration measures 4 cm x 2 cm x 0.3 cm.  This has worsened since last visit.  Increased wet appearance with malodor.  Hyperkeratotic lesions noted to both feet plantarly with tenderness.   Psychiatric: She has a normal mood and affect. Her behavior is normal.   Vitals reviewed.            Assessment:       Encounter Diagnoses   Name Primary?    Dry gangrene - Right Foot Yes    Diabetic polyneuropathy associated with type 2 diabetes mellitus     Cellulitis of right foot          Plan:       Kylie was seen today for diabetes mellitus and foot ulcer.    Diagnoses and all orders for this visit:    Dry gangrene - Right Foot    Diabetic polyneuropathy associated with type 2 diabetes mellitus    Cellulitis of right foot      Recommend hospital admission for infection control as well as probable hallux and partial 1st metatarsal amputation right foot.

## 2020-10-19 NOTE — PLAN OF CARE
10/19/20 1112   Final Note   Assessment Type Final Discharge Note   Anticipated Discharge Disposition Home   What phone number can be called within the next 1-3 days to see how you are doing after discharge? [phone]   Hospital Follow Up  Appt(s) scheduled? Yes   Post-Acute Status   Home Health Status   (Patient declined Home Health services- MD aware)   Discharge Delays None known at this time     DME setup complete.  Future Appointments   Date Time Provider Department Center   10/20/2020  8:15 AM Billy Robles DPM [facility] POD [place] [facility]   10/20/2020 10:00 AM Tuan Thorne MD [facility] IM [place]   10/29/2020 10:45 AM CATHI DavisM [facility] POD [place] [facility]   11/3/2020 10:15 AM CATHI DavisM [facility] POD [place] [facility]   11/3/2020 10:30 AM Kyung Kennedy PA-C [facility] ID [place] [facility]   11/17/2020 10:45 AM CATHI DavisM [facility] POD [place] [facility]   11/27/2020 11:30 AM Julio César Guerra MD [facility] ID [place] [facility]   12/22/2020  1:00 PM VASCULAR, LAB [facility] [place] [facility]   12/22/2020  2:00 PM VASCULAR, LAB [facility] [place] [facility]   12/22/2020  2:45 PM RICK Pollard III, MD [facility] [place]

## 2020-10-19 NOTE — PROGRESS NOTES
C3 nurse attempted to contact patient. The following occurred:   C3 nurse attempted to contact Kylie King for a TCC post hospital discharge follow up call. The patient is unable to conduct the call @ this time. The patient requested a callback.    The patient has a scheduled HOSFU appointment with Dr Thorne on 10/20/2020 @ 1000. Message sent to Physician staff.

## 2020-10-20 ENCOUNTER — OFFICE VISIT (OUTPATIENT)
Dept: INTERNAL MEDICINE | Facility: CLINIC | Age: 65
End: 2020-10-20
Payer: MEDICARE

## 2020-10-20 ENCOUNTER — OFFICE VISIT (OUTPATIENT)
Dept: PODIATRY | Facility: CLINIC | Age: 65
End: 2020-10-20
Payer: MEDICARE

## 2020-10-20 VITALS
HEART RATE: 78 BPM | SYSTOLIC BLOOD PRESSURE: 110 MMHG | DIASTOLIC BLOOD PRESSURE: 68 MMHG | WEIGHT: 283.94 LBS | BODY MASS INDEX: 48.74 KG/M2

## 2020-10-20 VITALS
HEART RATE: 77 BPM | DIASTOLIC BLOOD PRESSURE: 68 MMHG | WEIGHT: 293 LBS | RESPIRATION RATE: 17 BRPM | BODY MASS INDEX: 50.02 KG/M2 | SYSTOLIC BLOOD PRESSURE: 110 MMHG | TEMPERATURE: 97 F | OXYGEN SATURATION: 96 % | HEIGHT: 64 IN

## 2020-10-20 DIAGNOSIS — E11.42 DIABETIC POLYNEUROPATHY ASSOCIATED WITH TYPE 2 DIABETES MELLITUS: Primary | ICD-10-CM

## 2020-10-20 DIAGNOSIS — E11.621 DIABETIC ULCER OF RIGHT HEEL ASSOCIATED WITH TYPE 2 DIABETES MELLITUS, WITH FAT LAYER EXPOSED: ICD-10-CM

## 2020-10-20 DIAGNOSIS — I96 GANGRENE OF TOE OF RIGHT FOOT: ICD-10-CM

## 2020-10-20 DIAGNOSIS — Z09 HOSPITAL DISCHARGE FOLLOW-UP: Primary | ICD-10-CM

## 2020-10-20 DIAGNOSIS — I73.9 PERIPHERAL VASCULAR DISEASE: ICD-10-CM

## 2020-10-20 DIAGNOSIS — Z98.890 POST-OPERATIVE STATE: ICD-10-CM

## 2020-10-20 DIAGNOSIS — L97.412 DIABETIC ULCER OF RIGHT HEEL ASSOCIATED WITH TYPE 2 DIABETES MELLITUS, WITH FAT LAYER EXPOSED: ICD-10-CM

## 2020-10-20 DIAGNOSIS — I96 DRY GANGRENE: ICD-10-CM

## 2020-10-20 PROCEDURE — 99495 TRANSJ CARE MGMT MOD F2F 14D: CPT | Mod: S$GLB,,, | Performed by: INTERNAL MEDICINE

## 2020-10-20 PROCEDURE — 99024 POSTOP FOLLOW-UP VISIT: CPT | Mod: S$GLB,,, | Performed by: PODIATRIST

## 2020-10-20 PROCEDURE — 99999 PR PBB SHADOW E&M-EST. PATIENT-LVL IV: CPT | Mod: PBBFAC,,, | Performed by: PODIATRIST

## 2020-10-20 PROCEDURE — 99495 TCM SERVICES (MODERATE COMPLEXITY): ICD-10-PCS | Mod: S$GLB,,, | Performed by: INTERNAL MEDICINE

## 2020-10-20 PROCEDURE — 99024 PR POST-OP FOLLOW-UP VISIT: ICD-10-PCS | Mod: S$GLB,,, | Performed by: PODIATRIST

## 2020-10-20 PROCEDURE — 99999 PR PBB SHADOW E&M-EST. PATIENT-LVL III: CPT | Mod: PBBFAC,,, | Performed by: INTERNAL MEDICINE

## 2020-10-20 PROCEDURE — 99999 PR PBB SHADOW E&M-EST. PATIENT-LVL IV: ICD-10-PCS | Mod: PBBFAC,,, | Performed by: PODIATRIST

## 2020-10-20 PROCEDURE — 99999 PR PBB SHADOW E&M-EST. PATIENT-LVL III: ICD-10-PCS | Mod: PBBFAC,,, | Performed by: INTERNAL MEDICINE

## 2020-10-20 RX ORDER — ACETAMINOPHEN AND CODEINE PHOSPHATE 300; 30 MG/1; MG/1
1 TABLET ORAL EVERY 8 HOURS PRN
Qty: 30 TABLET | Refills: 0 | Status: SHIPPED | OUTPATIENT
Start: 2020-10-20 | End: 2021-01-01 | Stop reason: SDUPTHER

## 2020-10-20 NOTE — PROGRESS NOTES
Subjective:       Patient ID: Kylie King is a 65 y.o. female.    Chief Complaint: Follow-up (Hospital ), Low-back Pain, and Foot Pain (Right )    HPI     65-year-old female here for hospital follow-up.    Family and/or Caretaker present at visit?  Yes.  Diagnostic tests reviewed/disposition: I have reviewed all completed as well as pending diagnostic tests at the time of discharge.  Disease/illness education:  Right foot wound  Home health/community services discussion/referrals: Patient does not have home health established from hospital visit.  They do not need home health.  If needed, we will set up home health for the patient.   Establishment or re-establishment of referral orders for community resources: No other necessary community resources.   Discussion with other health care providers: No discussion with other health care providers necessary.  I reviewed and reconciled the medications from hospital discharge.    Patient was admitted to the hospital for right foot wound.  She was treated with vancomycin and cefepime.  Her right big toe has been amputated.  She saw podiatry before going to the hospital.  Her toe was amputated in the hospital.    She was recently seen by podiatry who bandaged the foot.  She has had some pain since then - since she had the toe amputated.  She has pain from the arch of her foot shooting up her leg.  The pain is mostly at night.  She is on gabapentin 400 mg daily because of ESRD on HD.  She takes tylenol #3 when her back is so bad that she cannot take the pain.  She has spinal stenosis at L4-5.  She has had a nerve ablation that helped for a while.  It hurt so badly when this happened.  She does not want to do RFAs, because of the increased pain after the ablation.  She has had physical therapy at home.  It helped with her mastectomy, but did not relieve the back issues.    She is on antibiotics three times a week with dialysis (cefepime).  She reports that her leg is  "about the same.  There are times she was cleaning the toe before the amputation with betadiene.    Discharge summary:  HPI:   Ms. King is a 65 yo F with T2DM, ESRD on HD (MWF), HFpEF (EF 50-55%), HTN, HLD, COPD, T2DM, chronic venous insufficiency, and anxiety/depression that presents for worsening R foot wound. She has multiple admission recently, for same complains, treated with course of Vanc and cefepime (stopped 9/24) she is s/p SFA stenting on 09/17/2020  for a completely occluded SFA on aspirin and plavix. She reports for the past 3 weeks her symptoms worsening, her foot was continuing to hurt to the point prompt her the visit the ED. The past few days she has noticed some foul-smelling discharge from the foot and has had fever at home with T-max of 101.8° F. She endorses an associated "burning sensation in that foot.  She has chronic neuropathy but denies any new numbness, weakness or calf swelling.  She was fully dialyzed yesterday. Another ROS negatives. She lives with a roommate. Report compliance with her medications and dialysis. She smokes cigarettes occasionally, denied alcohol drinking or IVDA.       Procedure(s) (LRB):  PARTIAL RAY AMPUTATION GREAT TOE (Right)       Hospital Course:   65 year old female with chronic osteomyelitis of the right foot comes in with worsening wound, fevers and increased pain. Podiatry was consulted and the toe was amputated. Cultures preliminarily gre proteus species. ID consulted, recommend IV Cefepime 2g after dialysis MWF with close follow up. PT/OT recommend home health but patient refused. Will have close follow up with ID and podiatry. Referral to outpt PT made.     Right toe wound infection   Ms. King is a 65 yo F with T2DM, ESRD on HD (MWF), HFpEF (EF 50-55%), HTN, HLD, COPD, T2DM, chronic venous insufficiency, and anxiety/depression that presents for worsening R foot wound. She has multiple admission recently, for same complains, treated with course of " Vanc and cefepime (stopped 9/24) she is s/p SFA stenting on 09/17/2020 for a completely occluded SFA.      Fever, worsen pain in the right toe, foul smell. likely gangrene of the right foot.   She was seen by podiatry on 10/1, wound debridement was done   Elevated inflammatory makers   Foot xray showed multiple fracture and possible superimposed infections      MRI:  1st digit.  posttraumatic or secondary to infection/osteomyelitis.  Visualization is limited. Small fractures of the distal 2nd, 3rd and 4th metatarsals with associated edema could be post-traumatic.     Ddx include gangrene, superimposed infection, osteomyelitis     Lactate normalized     Aerobic culture growing presumptive rpoteus species  Plan:         - Podiatry consulted appreciate recs, will hold on vascular consult at this time as Podiatry planning for amputation of the right toe  - Follow Blood Cx  - resume plavix  - ID for help with antibiotics  - 2g cefepime for broad spectrum coverage MWF after dialysis - close follow up with ID  - follow up with ID, Podiatry        Hyponatremia  NA WNL  Improving with dialysis  Hypervolemic Hyponatremia  -1.5L fluid restrictions  Resolved     Hyperphosphatemia  Begin sevelemer with meals per nephrology recs        Normocytic anemia  Hb stable     Plan:   - Follow iron panel, folate and b12   - Daily CBC   - Transfuse for Hb < 7            Depression  Resume home Citalopram 20 mg QHS         ESRD (end stage renal disease) on dialysis  HD MWF  Last session was 10/16     Consult Nephrology for dialysis while in hospital   Resume outpatient dialysis when discharged     Debility  PT/OT   Home health OT, PT     Morbid obesity  BMI:   Comorbidities include: DM, HTN, ESRD     Plan:   - The goal of therapy is to prevent, treat or reverse to complications of obesity and improve quality of life  - Patient would benefit from comprehensive life style modifications   - Weight loss education   - Encourage exercise   -  Dietary modifications Consider consult Dietician for weight loss diet education, appreciate recs   - Consider referral to Bariatric medicine/surgery            DDD (degenerative disc disease), lumbar  Continue home Gabapentin      Diabetic peripheral neuropathy associated with type 2 diabetes mellitus  Continue home Gabapentin      Type 2 diabetes mellitus  Last Hemoglobin A1C 8.8 on 9/3  Home regimen: Home meds: levemir 45U BID, Novolog 15U TIDWM     Plan:   - Hold home meds   - Glucose goals while inpatient 140-180s  - Glucose checks q6   - 18 units bid + 12 Us TID WM with LDSS >> Adjust as needed   - Diet: Diabetic/Renal   - She needs a follow up with Endocrinology, diabetic diet education   - discharge on home regimen           Hypercholesterolemia  Continue home statin         Essential hypertension  Hypertensive on arrival  Continue home amlodipine 10mg, coreg 12.5mg BID (has been taking 25mg daily), hydralazine 25mg daily    Review of Systems      Objective:      Physical Exam  Vitals signs reviewed.   Constitutional:       Appearance: She is well-developed.   HENT:      Head: Normocephalic and atraumatic.      Mouth/Throat:      Pharynx: No oropharyngeal exudate.   Eyes:      General: No scleral icterus.        Right eye: No discharge.         Left eye: No discharge.      Pupils: Pupils are equal, round, and reactive to light.   Neck:      Musculoskeletal: Normal range of motion and neck supple.      Thyroid: No thyromegaly.      Trachea: No tracheal deviation.   Cardiovascular:      Rate and Rhythm: Normal rate and regular rhythm.      Heart sounds: Normal heart sounds. No murmur. No friction rub. No gallop.    Pulmonary:      Effort: Pulmonary effort is normal. No respiratory distress.      Breath sounds: Normal breath sounds. No wheezing or rales.   Chest:      Chest wall: No tenderness.   Abdominal:      General: Bowel sounds are normal. There is no distension.      Palpations: Abdomen is soft. There is  no mass.      Tenderness: There is no abdominal tenderness. There is no guarding or rebound.   Musculoskeletal: Normal range of motion.         General: No tenderness.   Skin:     General: Skin is warm and dry.      Coloration: Skin is not pale.      Findings: No erythema or rash.   Neurological:      Mental Status: She is alert and oriented to person, place, and time.   Psychiatric:         Behavior: Behavior normal.         Assessment:       1. Hospital discharge follow-up    2. Gangrene of toe of right foot    3. Dry gangrene    4. Diabetic ulcer of right heel associated with type 2 diabetes mellitus, with fat layer exposed        Plan:       1/2/3/4.  Continue with cefepime per Infectious Disease.  Continue follow-up with infectious disease and podiatry.

## 2020-10-21 ENCOUNTER — TELEPHONE (OUTPATIENT)
Dept: INFECTIOUS DISEASES | Facility: CLINIC | Age: 65
End: 2020-10-21

## 2020-10-21 LAB — BACTERIA SPEC ANAEROBE CULT: NORMAL

## 2020-10-21 NOTE — TELEPHONE ENCOUNTER
Infectious Disease - Outpatient lab follow up     Dx:  Osteomyelitis/gangrene right great toe s/p partial right 1st ray amputation and arthroplasty and closure of the right 2nd toe. Prior vascular intervention 9/17  Clean margin bone cultures, however,  showing presumptive Proteus (susceptibilities pending).   Antibiotics: D/c on IV cefepime after dialysis pending susceptibilities  Now showing pan S Proteus and new Staph intermedius resulted 10/20  Have asked micro to run susceptibilities     Estimated End Date: 11/26      Date Baseline 10/20   WBC  8.0   H/H  9/31   PLT  275   Creat  HD   AST  16   ALT  28   ALP  71   ESR 85 66    68   CPK         Continue cefepime.   Discussed with ID Pharm D.  80-90% of isolates are empirically S to oxacillin. Asked ritu to run suscep with Staph intermedius.  Will keep cefepime along pending susceptibilities.   Follow up pathology  Communicated with Podiatry who had follow up with her yesterday re: status of wound.   ID follow up 11/3, but will make any antibiotic changes needed before then

## 2020-10-22 LAB
BACTERIA SPEC AEROBE CULT: ABNORMAL
BACTERIA SPEC AEROBE CULT: ABNORMAL

## 2020-10-23 LAB
FINAL PATHOLOGIC DIAGNOSIS: NORMAL
Lab: NORMAL

## 2020-10-23 NOTE — PROGRESS NOTES
Patient presents approximately 1 week status post right partial ray resection/amputation.  She is doing well minimal discomfort.  She is walking in a surgical shoe with a walker.  She is mostly nonambulatory at this point.  She has been keeping the area clean and dry with bandage intact.  The incisions are healing well with no signs of further infection or dehiscence of the wound.  She does not want home health.  We dressed the right foot with Betadine, foam padding, 4 x 4 gauze cast padding and Coban.  Patient will utilize it Darco shoe.  She will follow up in 1 week for re-evaluation.

## 2020-10-26 NOTE — PHYSICIAN QUERY
PT Name: Kylie King  MR #: [id number]    Pathology Findings Clarification     CDS/: Liza YOO, RN               Contact information: [email or web address]  This form is a permanent document in the medical record.     Query Date: October 26, 2020    By submitting this query, we are merely seeking further clarification of documentation.  Please utilize your independent clinical judgment when addressing the question(s) below.    The medical record contains the following:  Pathology Findings Location in Medical Record      Final Pathologic Diagnosis    A. 1 ST  METATARSAL, PARTIAL RAY AMPUTATION:   -Portions of acutely inflamed soft tissue, bone and overlying cartilage showing bony remodeling, serous atrophy and focal changes consistent with acute osteomyelitis.     B. GREAT TOE, AMPUTATION:   -Underlying bone with acute osteomyelitis.   -Portions of ulcerated, necrotic skin and underlying soft tissue showing granulation tissue, marked acute inflammation and ischemic change.      65 year old female with chronic osteomyelitis of the right foot comes in with worsening wound, fevers and increased pain. Podiatry was consulted and the toe was amputated. Cultures preliminarily gre proteus species. ID consulted, recommend IV Cefepime 2g after dialysis MWF with close follow up.      Final Pathology Report 10/23/20                         Dr. Lopez / Dr. [facility] Medicine discharge summary 10/17/20       Due to conflicting diagnoses, please clarify the acuity of osteomyelitis:    [X ] Pathology findings noted above is confirmed as a diagnosis     [  ] Pathology findings noted above is not confirmed as a diagnosis     [  ] Other diagnosis (please specify): ___________     [  ] Clinically Undetermined         Please document in your progress notes daily for the duration of treatment until resolved and include in your discharge summary.

## 2020-10-29 ENCOUNTER — TELEPHONE (OUTPATIENT)
Dept: PODIATRY | Facility: CLINIC | Age: 65
End: 2020-10-29

## 2020-11-03 ENCOUNTER — OFFICE VISIT (OUTPATIENT)
Dept: INFECTIOUS DISEASES | Facility: CLINIC | Age: 65
End: 2020-11-03
Payer: MEDICARE

## 2020-11-03 ENCOUNTER — OFFICE VISIT (OUTPATIENT)
Dept: PODIATRY | Facility: CLINIC | Age: 65
End: 2020-11-03
Payer: MEDICARE

## 2020-11-03 ENCOUNTER — TELEPHONE (OUTPATIENT)
Dept: INFECTIOUS DISEASES | Facility: CLINIC | Age: 65
End: 2020-11-03

## 2020-11-03 ENCOUNTER — LAB VISIT (OUTPATIENT)
Dept: LAB | Facility: HOSPITAL | Age: 65
End: 2020-11-03
Attending: INTERNAL MEDICINE
Payer: MEDICARE

## 2020-11-03 VITALS
HEART RATE: 86 BPM | TEMPERATURE: 97 F | HEIGHT: 64 IN | BODY MASS INDEX: 46.7 KG/M2 | DIASTOLIC BLOOD PRESSURE: 88 MMHG | WEIGHT: 273.56 LBS | SYSTOLIC BLOOD PRESSURE: 136 MMHG

## 2020-11-03 DIAGNOSIS — Z51.81 THERAPEUTIC DRUG MONITORING: ICD-10-CM

## 2020-11-03 DIAGNOSIS — M86.9 OSTEOMYELITIS OF RIGHT FOOT, UNSPECIFIED TYPE: ICD-10-CM

## 2020-11-03 DIAGNOSIS — L08.9 TOE INFECTION: ICD-10-CM

## 2020-11-03 DIAGNOSIS — L97.523 ULCER OF LEFT FOOT WITH NECROSIS OF MUSCLE: ICD-10-CM

## 2020-11-03 DIAGNOSIS — B95.8 STAPH INFECTION: Primary | ICD-10-CM

## 2020-11-03 DIAGNOSIS — Z79.2 RECEIVING INTRAVENOUS ANTIBIOTIC TREATMENT AS OUTPATIENT: ICD-10-CM

## 2020-11-03 LAB
ALBUMIN SERPL BCP-MCNC: 3.2 G/DL (ref 3.5–5.2)
ALP SERPL-CCNC: 79 U/L (ref 55–135)
ALT SERPL W/O P-5'-P-CCNC: 18 U/L (ref 10–44)
ANION GAP SERPL CALC-SCNC: 15 MMOL/L (ref 8–16)
AST SERPL-CCNC: 15 U/L (ref 10–40)
BASOPHILS # BLD AUTO: 0.05 K/UL (ref 0–0.2)
BASOPHILS NFR BLD: 0.8 % (ref 0–1.9)
BILIRUB SERPL-MCNC: 0.6 MG/DL (ref 0.1–1)
BUN SERPL-MCNC: 40 MG/DL (ref 8–23)
CALCIUM SERPL-MCNC: 9.6 MG/DL (ref 8.7–10.5)
CHLORIDE SERPL-SCNC: 92 MMOL/L (ref 95–110)
CO2 SERPL-SCNC: 26 MMOL/L (ref 23–29)
CREAT SERPL-MCNC: 6.8 MG/DL (ref 0.5–1.4)
CRP SERPL-MCNC: 30.1 MG/L (ref 0–8.2)
DIFFERENTIAL METHOD: ABNORMAL
EOSINOPHIL # BLD AUTO: 0.1 K/UL (ref 0–0.5)
EOSINOPHIL NFR BLD: 2 % (ref 0–8)
ERYTHROCYTE [DISTWIDTH] IN BLOOD BY AUTOMATED COUNT: 18.6 % (ref 11.5–14.5)
ERYTHROCYTE [SEDIMENTATION RATE] IN BLOOD BY WESTERGREN METHOD: 38 MM/HR (ref 0–36)
EST. GFR  (AFRICAN AMERICAN): 6.7 ML/MIN/1.73 M^2
EST. GFR  (NON AFRICAN AMERICAN): 5.8 ML/MIN/1.73 M^2
GLUCOSE SERPL-MCNC: 336 MG/DL (ref 70–110)
HCT VFR BLD AUTO: 35.9 % (ref 37–48.5)
HGB BLD-MCNC: 10.8 G/DL (ref 12–16)
IMM GRANULOCYTES # BLD AUTO: 0.03 K/UL (ref 0–0.04)
IMM GRANULOCYTES NFR BLD AUTO: 0.5 % (ref 0–0.5)
LYMPHOCYTES # BLD AUTO: 1.1 K/UL (ref 1–4.8)
LYMPHOCYTES NFR BLD: 16.7 % (ref 18–48)
MCH RBC QN AUTO: 27.7 PG (ref 27–31)
MCHC RBC AUTO-ENTMCNC: 30.1 G/DL (ref 32–36)
MCV RBC AUTO: 92 FL (ref 82–98)
MONOCYTES # BLD AUTO: 0.5 K/UL (ref 0.3–1)
MONOCYTES NFR BLD: 7.8 % (ref 4–15)
NEUTROPHILS # BLD AUTO: 4.7 K/UL (ref 1.8–7.7)
NEUTROPHILS NFR BLD: 72.2 % (ref 38–73)
NRBC BLD-RTO: 0 /100 WBC
PLATELET # BLD AUTO: 226 K/UL (ref 150–350)
PMV BLD AUTO: 11.4 FL (ref 9.2–12.9)
POTASSIUM SERPL-SCNC: 5.5 MMOL/L (ref 3.5–5.1)
PROT SERPL-MCNC: 8.1 G/DL (ref 6–8.4)
RBC # BLD AUTO: 3.9 M/UL (ref 4–5.4)
SODIUM SERPL-SCNC: 133 MMOL/L (ref 136–145)
WBC # BLD AUTO: 6.54 K/UL (ref 3.9–12.7)

## 2020-11-03 PROCEDURE — 80053 COMPREHEN METABOLIC PANEL: CPT

## 2020-11-03 PROCEDURE — 99024 PR POST-OP FOLLOW-UP VISIT: ICD-10-PCS | Mod: S$GLB,,, | Performed by: PODIATRIST

## 2020-11-03 PROCEDURE — 85652 RBC SED RATE AUTOMATED: CPT

## 2020-11-03 PROCEDURE — 99213 PR OFFICE/OUTPT VISIT, EST, LEVL III, 20-29 MIN: ICD-10-PCS | Mod: S$GLB,,, | Performed by: PHYSICIAN ASSISTANT

## 2020-11-03 PROCEDURE — 36415 COLL VENOUS BLD VENIPUNCTURE: CPT

## 2020-11-03 PROCEDURE — 11043 PR DEBRIDEMENT, SKIN, SUB-Q TISSUE,MUSCLE,=<20 SQ CM: ICD-10-PCS | Mod: 79,LT,S$GLB, | Performed by: PODIATRIST

## 2020-11-03 PROCEDURE — 99213 OFFICE O/P EST LOW 20 MIN: CPT | Mod: S$GLB,,, | Performed by: PHYSICIAN ASSISTANT

## 2020-11-03 PROCEDURE — 86140 C-REACTIVE PROTEIN: CPT

## 2020-11-03 PROCEDURE — 99999 PR PBB SHADOW E&M-EST. PATIENT-LVL IV: CPT | Mod: PBBFAC,,, | Performed by: PODIATRIST

## 2020-11-03 PROCEDURE — 99999 PR PBB SHADOW E&M-EST. PATIENT-LVL V: ICD-10-PCS | Mod: PBBFAC,,, | Performed by: PHYSICIAN ASSISTANT

## 2020-11-03 PROCEDURE — 99024 POSTOP FOLLOW-UP VISIT: CPT | Mod: S$GLB,,, | Performed by: PODIATRIST

## 2020-11-03 PROCEDURE — 99999 PR PBB SHADOW E&M-EST. PATIENT-LVL V: CPT | Mod: PBBFAC,,, | Performed by: PHYSICIAN ASSISTANT

## 2020-11-03 PROCEDURE — 85025 COMPLETE CBC W/AUTO DIFF WBC: CPT

## 2020-11-03 PROCEDURE — 99999 PR PBB SHADOW E&M-EST. PATIENT-LVL IV: ICD-10-PCS | Mod: PBBFAC,,, | Performed by: PODIATRIST

## 2020-11-03 PROCEDURE — 11043 DBRDMT MUSC&/FSCA 1ST 20/<: CPT | Mod: 79,LT,S$GLB, | Performed by: PODIATRIST

## 2020-11-03 NOTE — PROGRESS NOTES
Subjective:       Patient ID: Kylie King is a 65 y.o. female.    Chief Complaint: Follow-up    HPI     65 year old woman with DM, ESRD on HD, HTN, COPD, HFpEF, AD, chronic venous insufficiency who presented 10/13 with right foot wound/gangrene s/p partial 1st ray amputation and closure of 2nd toe on 10/16/2020. Clean margin bone cultures +Staph intermedius and Proteus. She apparently had SFA stent placed on 9/17.  MRI concerning for osteomyelitis of right hallux. Blood cultures NGTD. She was discharged on cefepime alone given culture data. Her cultures from her bone specimen also grew staph intermedius. She has remained afebrile, no leukocytosis.     She is seen with her  at bedside. Reports low grade fevers but reports having sinus congestion. No chills or night sweats. Her surgical wound has dehisced. No purulent drainage. Swelling noted and mild erythema. Nontender. She unfortunately fell earlier this week and sustained a wound on her right knee. It has improved per pt.    Review of Systems   Constitutional: Negative for activity change, appetite change, chills, diaphoresis, fatigue and fever.   HENT: Positive for sinus pressure/congestion.    Respiratory: Negative for cough and shortness of breath.    Cardiovascular: Positive for leg swelling.   Gastrointestinal: Negative for abdominal pain, diarrhea, nausea and vomiting.   Genitourinary: Negative for dysuria.   Musculoskeletal: Negative for arthralgias, back pain and joint swelling.   Integumentary:  Positive for wound. Negative for rash.   Neurological: Negative for dizziness and headaches.         Objective:      Physical Exam  Constitutional:       General: She is not in acute distress.     Appearance: She is well-developed.   HENT:      Head: Normocephalic and atraumatic.      Mouth/Throat:      Mouth: No oral lesions.      Pharynx: Uvula midline.   Eyes:      General: No scleral icterus.     Conjunctiva/sclera: Conjunctivae normal.       Pupils: Pupils are equal, round, and reactive to light.   Neck:      Musculoskeletal: Normal range of motion.   Cardiovascular:      Rate and Rhythm: Normal rate and regular rhythm.      Heart sounds: Normal heart sounds. No murmur. No friction rub. No gallop.    Pulmonary:      Effort: Pulmonary effort is normal. No respiratory distress.      Breath sounds: Normal breath sounds. No wheezing or rales.   Abdominal:      General: Bowel sounds are normal. There is no distension.      Palpations: Abdomen is soft. There is no mass.      Tenderness: There is no abdominal tenderness. There is no guarding or rebound.   Musculoskeletal:         General: Swelling and deformity present.      Right lower leg: Edema present.   Lymphadenopathy:      Head:      Right side of head: No submental, submandibular, tonsillar, preauricular, posterior auricular or occipital adenopathy.      Left side of head: No submental, submandibular, tonsillar, preauricular, posterior auricular or occipital adenopathy.      Cervical: No cervical adenopathy.      Upper Body:      Right upper body: No supraclavicular or epitrochlear adenopathy.      Left upper body: No supraclavicular or epitrochlear adenopathy.   Skin:     General: Skin is warm and dry.      Findings: No rash.      Comments: Wound dehiscence noted  No purulent drainage  Swelling and erythema noted  nontender     Neurological:      Mental Status: She is alert and oriented to person, place, and time.                 Assessment:       1. Staph infection        Plan:       1. Continue cefepime (cover for proteus) 2g after HD M W F   2. Started vancomycin 500 mg to cover for Staph intermedius that grew in bone cultures to start tomorrow with HD M W F  3. Continue IV abx until 11/25/2020   4. F/u with ID at [facility] 11/25/2020   5. F/u with podiatry as scheduled  6. Continue with weekly CBC CMP ESR CRP vanc trough sent to ID clinic   7. Orders sent and faxed to HD to start vancomycin  8.  Recommend judicial leg elevation    9. mupirocin ointment to nares nightly.   10. All questions answered. Discussed plan with patient.  Gave her my contact information and advised to call with any concerns.

## 2020-11-04 VITALS
BODY MASS INDEX: 48.58 KG/M2 | WEIGHT: 283 LBS | SYSTOLIC BLOOD PRESSURE: 136 MMHG | DIASTOLIC BLOOD PRESSURE: 88 MMHG | HEART RATE: 86 BPM

## 2020-11-06 ENCOUNTER — PATIENT OUTREACH (OUTPATIENT)
Dept: ADMINISTRATIVE | Facility: HOSPITAL | Age: 65
End: 2020-11-06

## 2020-11-10 ENCOUNTER — OFFICE VISIT (OUTPATIENT)
Dept: PODIATRY | Facility: CLINIC | Age: 65
End: 2020-11-10
Payer: MEDICARE

## 2020-11-10 ENCOUNTER — LAB VISIT (OUTPATIENT)
Dept: LAB | Facility: HOSPITAL | Age: 65
End: 2020-11-10
Payer: MEDICARE

## 2020-11-10 VITALS
WEIGHT: 273.56 LBS | BODY MASS INDEX: 46.96 KG/M2 | HEART RATE: 81 BPM | DIASTOLIC BLOOD PRESSURE: 89 MMHG | SYSTOLIC BLOOD PRESSURE: 142 MMHG

## 2020-11-10 DIAGNOSIS — L08.9 TOE INFECTION: ICD-10-CM

## 2020-11-10 DIAGNOSIS — R60.0 LEG EDEMA, LEFT: ICD-10-CM

## 2020-11-10 DIAGNOSIS — L97.523 ULCER OF LEFT FOOT WITH NECROSIS OF MUSCLE: Primary | ICD-10-CM

## 2020-11-10 DIAGNOSIS — Z79.2 RECEIVING INTRAVENOUS ANTIBIOTIC TREATMENT AS OUTPATIENT: ICD-10-CM

## 2020-11-10 LAB
ALBUMIN SERPL BCP-MCNC: 3.2 G/DL (ref 3.5–5.2)
ALP SERPL-CCNC: 82 U/L (ref 55–135)
ALT SERPL W/O P-5'-P-CCNC: 18 U/L (ref 10–44)
ANION GAP SERPL CALC-SCNC: 13 MMOL/L (ref 8–16)
AST SERPL-CCNC: 14 U/L (ref 10–40)
BASOPHILS # BLD AUTO: 0.06 K/UL (ref 0–0.2)
BASOPHILS NFR BLD: 0.8 % (ref 0–1.9)
BILIRUB SERPL-MCNC: 0.4 MG/DL (ref 0.1–1)
BUN SERPL-MCNC: 36 MG/DL (ref 8–23)
CALCIUM SERPL-MCNC: 9.5 MG/DL (ref 8.7–10.5)
CHLORIDE SERPL-SCNC: 94 MMOL/L (ref 95–110)
CO2 SERPL-SCNC: 27 MMOL/L (ref 23–29)
CREAT SERPL-MCNC: 6.9 MG/DL (ref 0.5–1.4)
CRP SERPL-MCNC: 28.9 MG/L (ref 0–8.2)
DIFFERENTIAL METHOD: ABNORMAL
EOSINOPHIL # BLD AUTO: 0.1 K/UL (ref 0–0.5)
EOSINOPHIL NFR BLD: 1.7 % (ref 0–8)
ERYTHROCYTE [DISTWIDTH] IN BLOOD BY AUTOMATED COUNT: 18.6 % (ref 11.5–14.5)
ERYTHROCYTE [SEDIMENTATION RATE] IN BLOOD BY WESTERGREN METHOD: 35 MM/HR (ref 0–36)
EST. GFR  (AFRICAN AMERICAN): 6.6 ML/MIN/1.73 M^2
EST. GFR  (NON AFRICAN AMERICAN): 5.7 ML/MIN/1.73 M^2
GLUCOSE SERPL-MCNC: 223 MG/DL (ref 70–110)
HCT VFR BLD AUTO: 32.9 % (ref 37–48.5)
HGB BLD-MCNC: 10.1 G/DL (ref 12–16)
IMM GRANULOCYTES # BLD AUTO: 0.05 K/UL (ref 0–0.04)
IMM GRANULOCYTES NFR BLD AUTO: 0.7 % (ref 0–0.5)
LYMPHOCYTES # BLD AUTO: 1.3 K/UL (ref 1–4.8)
LYMPHOCYTES NFR BLD: 16.8 % (ref 18–48)
MCH RBC QN AUTO: 28 PG (ref 27–31)
MCHC RBC AUTO-ENTMCNC: 30.7 G/DL (ref 32–36)
MCV RBC AUTO: 91 FL (ref 82–98)
MONOCYTES # BLD AUTO: 0.7 K/UL (ref 0.3–1)
MONOCYTES NFR BLD: 9.2 % (ref 4–15)
NEUTROPHILS # BLD AUTO: 5.3 K/UL (ref 1.8–7.7)
NEUTROPHILS NFR BLD: 70.8 % (ref 38–73)
NRBC BLD-RTO: 0 /100 WBC
PLATELET # BLD AUTO: 204 K/UL (ref 150–350)
PMV BLD AUTO: 11.4 FL (ref 9.2–12.9)
POTASSIUM SERPL-SCNC: 5.1 MMOL/L (ref 3.5–5.1)
PROT SERPL-MCNC: 7.7 G/DL (ref 6–8.4)
RBC # BLD AUTO: 3.61 M/UL (ref 4–5.4)
SODIUM SERPL-SCNC: 134 MMOL/L (ref 136–145)
WBC # BLD AUTO: 7.5 K/UL (ref 3.9–12.7)

## 2020-11-10 PROCEDURE — 80053 COMPREHEN METABOLIC PANEL: CPT

## 2020-11-10 PROCEDURE — 85652 RBC SED RATE AUTOMATED: CPT

## 2020-11-10 PROCEDURE — 85025 COMPLETE CBC W/AUTO DIFF WBC: CPT

## 2020-11-10 PROCEDURE — 99999 PR PBB SHADOW E&M-EST. PATIENT-LVL IV: ICD-10-PCS | Mod: PBBFAC,,, | Performed by: PODIATRIST

## 2020-11-10 PROCEDURE — 36415 COLL VENOUS BLD VENIPUNCTURE: CPT

## 2020-11-10 PROCEDURE — 99999 PR PBB SHADOW E&M-EST. PATIENT-LVL IV: CPT | Mod: PBBFAC,,, | Performed by: PODIATRIST

## 2020-11-10 PROCEDURE — 99024 PR POST-OP FOLLOW-UP VISIT: ICD-10-PCS | Mod: S$GLB,,, | Performed by: PODIATRIST

## 2020-11-10 PROCEDURE — 86140 C-REACTIVE PROTEIN: CPT

## 2020-11-10 PROCEDURE — 99024 POSTOP FOLLOW-UP VISIT: CPT | Mod: S$GLB,,, | Performed by: PODIATRIST

## 2020-11-10 NOTE — PROGRESS NOTES
Patient presents approximately approximately 2 weeks status post right partial ray resection/amputation.  She is doing well minimal discomfort.  She is walking in a surgical shoe with a walker.  She is mostly nonambulatory at this point.  She has been keeping the area clean and dry with bandage intact.  The incisions are healing well with no signs of further infection or dehiscence of the wound.  She does not want home health.  We dressed the right foot with Betadine, foam padding, 4 x 4 gauze cast padding and Coban.  Patient will utilize it Darco shoe.  She will follow up in 1 week for re-evaluation.  She is to follow up with infectious disease after this appointment.

## 2020-11-11 ENCOUNTER — TELEPHONE (OUTPATIENT)
Dept: INFECTIOUS DISEASES | Facility: CLINIC | Age: 65
End: 2020-11-11

## 2020-11-11 NOTE — TELEPHONE ENCOUNTER
Infectious Disease - Outpatient lab follow up     Dx:  Osteomyelitis/gangrene right great toe s/p partial right 1st ray amputation and arthroplasty and closure of the right 2nd toe. Prior vascular intervention 9/17  Clean margin bone cultures +  pan S Proteus and new Staph intermedius resulted 10/20  On cefepime and vancomycin after dialysis    Estimated End Date: 11/26    Date Baseline 10/20 11/3 11/10   WBC  8.0 6.5 7.5   H/H  9/31 10/35 10/32   PLT  295 226 204   Creat  HD HD HD   AST  16 15 14   ALT  28 19 19   ALP  71 79 2   ESR 85 66 3 35    68 30.1 2.9       Continue IV cefepime and vancomycin dosed after dialysis.   ID follow up 11/24

## 2020-11-13 ENCOUNTER — PATIENT MESSAGE (OUTPATIENT)
Dept: INFECTIOUS DISEASES | Facility: CLINIC | Age: 65
End: 2020-11-13

## 2020-11-13 NOTE — PROGRESS NOTES
"  Patient presents approximately approximately 4 weeks status post right partial ray resection/amputation.  She is doing well minimal discomfort.  She is walking in a surgical shoe with a walker.  She is mostly nonambulatory at this point.  She has been keeping the area clean and dry with bandage intact.  The incisions are healing well with no signs of further infection however there is mild dehiscence noted to the wound with fibrotic and granular tissue noted.  Partial exposure of muscle tissue as well.  Area was debrided and healthy bleeding tissue was apparent.  The wound measures 4 cm x 1 cm by 1 cm.    Wound Debridement    Performed by: Billy Robles DPM   Authorized by: Patient    Time out: Immediately prior to procedure a "time out" was called to verify the correct patient, procedure, equipment, support staff and site/side marked as required.   Consent Done?:  Yes (Verbal)  Local anesthesia used?: No       Wound Details:    Location:   right 1st metatarsal incision dehiscence     Type of Debridement:  Excisional       Length (cm):   4 cm       Width (cm):   1 cm       Depth (cm):   1 cm       Percent Debrided (%):  100             Depth of debridement:  Subcutaneous tissue    Tissue debrided:  Dermis, Epidermis and Subcutaneous    Devitalized tissue debrided:  Biofilm, Callus and Necrotic/Eschar    Instruments:  Blade, Curette and Nippers     Bleeding:  Minimal  Hemostasis Achieved: Yes    Method Used:  Pressure  Patient tolerance:  Patient tolerated the procedure well with no immediate complications    Right wound dressed with Aquacel Ag followed by foam padding cast padding and Coban.  Follow-up in 1 week.  Patient declined home health however was recommended.  We will discuss in 1 week.  "

## 2020-11-16 LAB — FUNGUS SPEC CULT: NORMAL

## 2020-11-17 ENCOUNTER — OFFICE VISIT (OUTPATIENT)
Dept: PODIATRY | Facility: CLINIC | Age: 65
End: 2020-11-17
Payer: MEDICARE

## 2020-11-17 ENCOUNTER — LAB VISIT (OUTPATIENT)
Dept: LAB | Facility: HOSPITAL | Age: 65
End: 2020-11-17
Payer: MEDICARE

## 2020-11-17 VITALS
SYSTOLIC BLOOD PRESSURE: 168 MMHG | DIASTOLIC BLOOD PRESSURE: 93 MMHG | BODY MASS INDEX: 46.96 KG/M2 | HEART RATE: 76 BPM | WEIGHT: 273.56 LBS

## 2020-11-17 DIAGNOSIS — R60.0 LEG EDEMA, LEFT: ICD-10-CM

## 2020-11-17 DIAGNOSIS — L97.523 ULCER OF LEFT FOOT WITH NECROSIS OF MUSCLE: Primary | ICD-10-CM

## 2020-11-17 DIAGNOSIS — Z79.2 RECEIVING INTRAVENOUS ANTIBIOTIC TREATMENT AS OUTPATIENT: ICD-10-CM

## 2020-11-17 DIAGNOSIS — L08.9 TOE INFECTION: ICD-10-CM

## 2020-11-17 LAB
ALBUMIN SERPL BCP-MCNC: 3.4 G/DL (ref 3.5–5.2)
ALP SERPL-CCNC: 93 U/L (ref 55–135)
ALT SERPL W/O P-5'-P-CCNC: 21 U/L (ref 10–44)
ANION GAP SERPL CALC-SCNC: 15 MMOL/L (ref 8–16)
AST SERPL-CCNC: 15 U/L (ref 10–40)
BASOPHILS # BLD AUTO: 0.04 K/UL (ref 0–0.2)
BASOPHILS NFR BLD: 0.5 % (ref 0–1.9)
BILIRUB SERPL-MCNC: 0.5 MG/DL (ref 0.1–1)
BUN SERPL-MCNC: 33 MG/DL (ref 8–23)
CALCIUM SERPL-MCNC: 9.6 MG/DL (ref 8.7–10.5)
CHLORIDE SERPL-SCNC: 92 MMOL/L (ref 95–110)
CO2 SERPL-SCNC: 25 MMOL/L (ref 23–29)
CREAT SERPL-MCNC: 6.8 MG/DL (ref 0.5–1.4)
CRP SERPL-MCNC: 20.6 MG/L (ref 0–8.2)
DIFFERENTIAL METHOD: ABNORMAL
EOSINOPHIL # BLD AUTO: 0.1 K/UL (ref 0–0.5)
EOSINOPHIL NFR BLD: 1 % (ref 0–8)
ERYTHROCYTE [DISTWIDTH] IN BLOOD BY AUTOMATED COUNT: 20.4 % (ref 11.5–14.5)
ERYTHROCYTE [SEDIMENTATION RATE] IN BLOOD BY WESTERGREN METHOD: 56 MM/HR (ref 0–36)
EST. GFR  (AFRICAN AMERICAN): 6.7 ML/MIN/1.73 M^2
EST. GFR  (NON AFRICAN AMERICAN): 5.8 ML/MIN/1.73 M^2
GLUCOSE SERPL-MCNC: 375 MG/DL (ref 70–110)
HCT VFR BLD AUTO: 35.4 % (ref 37–48.5)
HGB BLD-MCNC: 11 G/DL (ref 12–16)
IMM GRANULOCYTES # BLD AUTO: 0.1 K/UL (ref 0–0.04)
IMM GRANULOCYTES NFR BLD AUTO: 1.3 % (ref 0–0.5)
LYMPHOCYTES # BLD AUTO: 1.2 K/UL (ref 1–4.8)
LYMPHOCYTES NFR BLD: 16.3 % (ref 18–48)
MCH RBC QN AUTO: 28.6 PG (ref 27–31)
MCHC RBC AUTO-ENTMCNC: 31.1 G/DL (ref 32–36)
MCV RBC AUTO: 92 FL (ref 82–98)
MONOCYTES # BLD AUTO: 0.6 K/UL (ref 0.3–1)
MONOCYTES NFR BLD: 8.1 % (ref 4–15)
NEUTROPHILS # BLD AUTO: 5.5 K/UL (ref 1.8–7.7)
NEUTROPHILS NFR BLD: 72.8 % (ref 38–73)
NRBC BLD-RTO: 1 /100 WBC
PLATELET # BLD AUTO: 221 K/UL (ref 150–350)
PMV BLD AUTO: 11.4 FL (ref 9.2–12.9)
POTASSIUM SERPL-SCNC: 4.8 MMOL/L (ref 3.5–5.1)
PROT SERPL-MCNC: 8.3 G/DL (ref 6–8.4)
RBC # BLD AUTO: 3.85 M/UL (ref 4–5.4)
SODIUM SERPL-SCNC: 132 MMOL/L (ref 136–145)
WBC # BLD AUTO: 7.62 K/UL (ref 3.9–12.7)

## 2020-11-17 PROCEDURE — 86140 C-REACTIVE PROTEIN: CPT

## 2020-11-17 PROCEDURE — 99024 PR POST-OP FOLLOW-UP VISIT: ICD-10-PCS | Mod: S$GLB,,, | Performed by: PODIATRIST

## 2020-11-17 PROCEDURE — 80053 COMPREHEN METABOLIC PANEL: CPT

## 2020-11-17 PROCEDURE — 85025 COMPLETE CBC W/AUTO DIFF WBC: CPT

## 2020-11-17 PROCEDURE — 99024 POSTOP FOLLOW-UP VISIT: CPT | Mod: S$GLB,,, | Performed by: PODIATRIST

## 2020-11-17 PROCEDURE — 85652 RBC SED RATE AUTOMATED: CPT

## 2020-11-17 PROCEDURE — 99999 PR PBB SHADOW E&M-EST. PATIENT-LVL IV: CPT | Mod: PBBFAC,,, | Performed by: PODIATRIST

## 2020-11-17 PROCEDURE — 36415 COLL VENOUS BLD VENIPUNCTURE: CPT

## 2020-11-17 PROCEDURE — 99999 PR PBB SHADOW E&M-EST. PATIENT-LVL IV: ICD-10-PCS | Mod: PBBFAC,,, | Performed by: PODIATRIST

## 2020-11-18 ENCOUNTER — PATIENT MESSAGE (OUTPATIENT)
Dept: INFECTIOUS DISEASES | Facility: CLINIC | Age: 65
End: 2020-11-18

## 2020-11-21 ENCOUNTER — PATIENT OUTREACH (OUTPATIENT)
Dept: ADMINISTRATIVE | Facility: OTHER | Age: 65
End: 2020-11-21

## 2020-11-21 PROBLEM — M86.071 ACUTE HEMATOGENOUS OSTEOMYELITIS OF RIGHT FOOT: Status: ACTIVE | Noted: 2020-11-21

## 2020-11-21 NOTE — PROGRESS NOTES
Subjective:      Patient ID: Kylie King is a 65 y.o. female.    Chief Complaint:Follow-up, Wound Infection, and Osteomyelitis       History of Present Illness     Ms. King is a 65 year old woman with DM, ESRD on HD, HTN, COPD, HFpEF, AD, chronic venous insufficiency who presented 10/13 with right foot wound/gangrene s/p partial 1st ray amputation and closure of 2nd toe on 10/16/2020. Clean margin bone cultures +Staph intermedius and Proteus. She apparently had SFA stent placed on 9/17.  MRI concerning for osteomyelitis of right hallux. Blood cultures NGTD. She was discharged on cefepime alone given culture data. Her cultures from her bone specimen also grew staph intermedius. She has remained afebrile, no leukocytosis.      At last visit, IV vancomycin added as S. Intermedius was oxacillin R.  She is here today for follow up.  EOC 11/25.        Date Baseline 10/20 11/3 11/10 11/17 11/23   WBC   8.0 6.5 7.5  8.04   H/H   9/31 10/35 10/32  11/38   PLT   295 226 204  182   Creat   HD HD HD HD    AST   16 15 14 15    ALT   28 19 19 21    ALP   71 79 2 93    ESR 85 66 3 35 56 33    68 30.1 28.9 20.6 17.2   Random vanc      23.4        Saw Dr. Robles today.  Per patient, wound appears to be improving.  See photos below.      Typically dialysis on MWF.  Because of Thanksgiving, dialysis this week Sunday, Tues and Friday this week.      B/p elevated on arrival and in Podiatry - 187/107.  Denies chest pain, SOB, HA, visual disturbance.  She is taking her antihypertensives as scheduled.        Review of Systems   Constitution: Positive for malaise/fatigue. Negative for chills, decreased appetite, diaphoresis, fever and night sweats.   HENT: Positive for congestion.    Eyes: Negative for blurred vision and visual disturbance.   Cardiovascular: Negative for chest pain, leg swelling and palpitations.   Respiratory: Negative for cough, shortness of breath and sputum production.    Hematologic/Lymphatic: Negative  for adenopathy.   Skin: Positive for poor wound healing. Negative for rash.   Musculoskeletal: Negative for joint pain and joint swelling.   Gastrointestinal: Positive for diarrhea (off and on depending on food). Negative for abdominal pain, nausea and vomiting.   Neurological: Positive for numbness (diabetic neuropathy) and paresthesias. Negative for dizziness, focal weakness, headaches and weakness.     Objective:   Physical Exam  Vitals signs reviewed.   Constitutional:       General: She is not in acute distress.     Appearance: She is not diaphoretic.      Comments: Presents in wheelchair     Eyes:      General: No scleral icterus.     Conjunctiva/sclera: Conjunctivae normal.   Cardiovascular:      Rate and Rhythm: Normal rate and regular rhythm.      Heart sounds: Murmur present.   Pulmonary:      Effort: Pulmonary effort is normal. No respiratory distress.      Breath sounds: Normal breath sounds. No rales.   Abdominal:      Palpations: Abdomen is soft.      Tenderness: There is no abdominal tenderness.   Musculoskeletal:      Comments: Right foot in football.  See Photos below.   No ascending warmth or erythema.    Skin:     General: Skin is warm and dry.      Findings: No rash.      Comments: RUE AVF - site clear    Chronic skin changes bilateral lower extremities     Neurological:      Mental Status: She is alert and oriented to person, place, and time.   Psychiatric:         Behavior: Behavior normal.                 Assessment:       1. Acute hematogenous osteomyelitis of right foot    2. Right toe wound infection     3. Chronic venous insufficiency    4. ESRD (end stage renal disease) on dialysis    5. Receiving intravenous antibiotic treatment as outpatient    6. Essential hypertension         Wound healing slowly, but improving.  Inflammatory markers trending down, but still elevated.   Given slow wound healing and persistent elevation in inflammatory markers, will extend IV vancomycin and cefepime X  14 days.       B/p elevated.  Normally runs 140's/80's.  Elevated 187/107 in clinic today, but on repeat 148/100. No associated symptoms.  Patient reports adherence with antihypertensives. Needs follow up with PCP.      Plan:     1. Extend IV vancomycin and cefepime extend X 2 more weeks.  Orders [facility] to [facility] and [email or web address].   2. Continue weekly labs.  Ordered.   3.  ID follow up in 14 days. Will coordinate with Podiatry.   4.  Patient instructed to contact PCP ASAP for blood pressure management.  Instructed to monitor b/p and go to ED with any chest pain, SOB, HA, visual disturbance, weakness.   5.  Continue leg elevation   6.  Call for any concerns.  Patient has my contact information.

## 2020-11-21 NOTE — PROGRESS NOTES
"  Patient presents approximately approximately 4 weeks status post right partial ray resection/amputation.  She is doing well minimal discomfort.  She is walking in a surgical shoe with a walker.  She is mostly nonambulatory at this point.  She has been keeping the area clean and dry with bandage intact.  The incisions are healing well with no signs of further infection however there is mild dehiscence noted to the wound with fibrotic and granular tissue noted.  Partial exposure of muscle tissue as well.  Area was debrided and healthy bleeding tissue was apparent.  The wound measures 3.5 cm x 0.7 cm by 1 cm.    Wound Debridement    Performed by: Billy Robles DPM   Authorized by: Patient    Time out: Immediately prior to procedure a "time out" was called to verify the correct patient, procedure, equipment, support staff and site/side marked as required.   Consent Done?:  Yes (Verbal)  Local anesthesia used?: No       Wound Details:    Location:   right 1st metatarsal incision dehiscence     Type of Debridement:  Excisional       Length (cm):   3.5 cm       Width (cm):   0.7 cm       Depth (cm):   1 cm       Percent Debrided (%):  100             Depth of debridement:  Subcutaneous tissue    Tissue debrided:  Dermis, Epidermis and Subcutaneous    Devitalized tissue debrided:  Biofilm, Callus and Necrotic/Eschar    Instruments:  Blade, Curette and Nippers     Bleeding:  Minimal  Hemostasis Achieved: Yes    Method Used:  Pressure  Patient tolerance:  Patient tolerated the procedure well with no immediate complications    Right wound dressed with Aquacel Ag followed by foam padding cast padding and Coban.  Follow-up in 1 week.    "

## 2020-11-23 ENCOUNTER — OFFICE VISIT (OUTPATIENT)
Dept: INFECTIOUS DISEASES | Facility: CLINIC | Age: 65
End: 2020-11-23
Payer: MEDICARE

## 2020-11-23 ENCOUNTER — OFFICE VISIT (OUTPATIENT)
Dept: PODIATRY | Facility: CLINIC | Age: 65
End: 2020-11-23
Payer: MEDICARE

## 2020-11-23 ENCOUNTER — LAB VISIT (OUTPATIENT)
Dept: LAB | Facility: HOSPITAL | Age: 65
End: 2020-11-23
Payer: MEDICARE

## 2020-11-23 VITALS
HEART RATE: 88 BPM | BODY MASS INDEX: 46.7 KG/M2 | TEMPERATURE: 99 F | SYSTOLIC BLOOD PRESSURE: 148 MMHG | HEIGHT: 64 IN | DIASTOLIC BLOOD PRESSURE: 100 MMHG

## 2020-11-23 VITALS
DIASTOLIC BLOOD PRESSURE: 91 MMHG | WEIGHT: 272.06 LBS | SYSTOLIC BLOOD PRESSURE: 176 MMHG | BODY MASS INDEX: 46.7 KG/M2 | HEART RATE: 89 BPM

## 2020-11-23 DIAGNOSIS — N18.6 ESRD (END STAGE RENAL DISEASE) ON DIALYSIS: ICD-10-CM

## 2020-11-23 DIAGNOSIS — Z51.81 THERAPEUTIC DRUG MONITORING: ICD-10-CM

## 2020-11-23 DIAGNOSIS — Z79.2 RECEIVING INTRAVENOUS ANTIBIOTIC TREATMENT AS OUTPATIENT: ICD-10-CM

## 2020-11-23 DIAGNOSIS — I10 ESSENTIAL HYPERTENSION: ICD-10-CM

## 2020-11-23 DIAGNOSIS — M86.071 ACUTE HEMATOGENOUS OSTEOMYELITIS OF RIGHT FOOT: Primary | ICD-10-CM

## 2020-11-23 DIAGNOSIS — I87.2 CHRONIC VENOUS INSUFFICIENCY: ICD-10-CM

## 2020-11-23 DIAGNOSIS — L08.9 TOE INFECTION: ICD-10-CM

## 2020-11-23 DIAGNOSIS — Z99.2 ESRD (END STAGE RENAL DISEASE) ON DIALYSIS: ICD-10-CM

## 2020-11-23 DIAGNOSIS — L97.419 HEEL ULCERATION, RIGHT, WITH UNSPECIFIED SEVERITY: ICD-10-CM

## 2020-11-23 DIAGNOSIS — M86.9 OSTEOMYELITIS OF RIGHT FOOT, UNSPECIFIED TYPE: ICD-10-CM

## 2020-11-23 LAB
ALBUMIN SERPL BCP-MCNC: 3.4 G/DL (ref 3.5–5.2)
ALP SERPL-CCNC: 82 U/L (ref 55–135)
ALT SERPL W/O P-5'-P-CCNC: 17 U/L (ref 10–44)
ANION GAP SERPL CALC-SCNC: 17 MMOL/L (ref 8–16)
AST SERPL-CCNC: 13 U/L (ref 10–40)
BASOPHILS # BLD AUTO: 0.05 K/UL (ref 0–0.2)
BASOPHILS NFR BLD: 0.6 % (ref 0–1.9)
BILIRUB SERPL-MCNC: 0.5 MG/DL (ref 0.1–1)
BUN SERPL-MCNC: 34 MG/DL (ref 8–23)
CALCIUM SERPL-MCNC: 9.6 MG/DL (ref 8.7–10.5)
CHLORIDE SERPL-SCNC: 92 MMOL/L (ref 95–110)
CO2 SERPL-SCNC: 27 MMOL/L (ref 23–29)
CREAT SERPL-MCNC: 5.9 MG/DL (ref 0.5–1.4)
CRP SERPL-MCNC: 17.2 MG/L (ref 0–8.2)
DIFFERENTIAL METHOD: ABNORMAL
EOSINOPHIL # BLD AUTO: 0.1 K/UL (ref 0–0.5)
EOSINOPHIL NFR BLD: 1.4 % (ref 0–8)
ERYTHROCYTE [DISTWIDTH] IN BLOOD BY AUTOMATED COUNT: 20.7 % (ref 11.5–14.5)
ERYTHROCYTE [SEDIMENTATION RATE] IN BLOOD BY WESTERGREN METHOD: 33 MM/HR (ref 0–36)
EST. GFR  (AFRICAN AMERICAN): 7.9 ML/MIN/1.73 M^2
EST. GFR  (NON AFRICAN AMERICAN): 6.9 ML/MIN/1.73 M^2
GLUCOSE SERPL-MCNC: 286 MG/DL (ref 70–110)
HCT VFR BLD AUTO: 38.2 % (ref 37–48.5)
HGB BLD-MCNC: 11.7 G/DL (ref 12–16)
IMM GRANULOCYTES # BLD AUTO: 0.05 K/UL (ref 0–0.04)
IMM GRANULOCYTES NFR BLD AUTO: 0.6 % (ref 0–0.5)
LYMPHOCYTES # BLD AUTO: 1 K/UL (ref 1–4.8)
LYMPHOCYTES NFR BLD: 12.7 % (ref 18–48)
MCH RBC QN AUTO: 28.4 PG (ref 27–31)
MCHC RBC AUTO-ENTMCNC: 30.6 G/DL (ref 32–36)
MCV RBC AUTO: 93 FL (ref 82–98)
MONOCYTES # BLD AUTO: 0.7 K/UL (ref 0.3–1)
MONOCYTES NFR BLD: 8.2 % (ref 4–15)
NEUTROPHILS # BLD AUTO: 6.2 K/UL (ref 1.8–7.7)
NEUTROPHILS NFR BLD: 76.5 % (ref 38–73)
NRBC BLD-RTO: 0 /100 WBC
PLATELET # BLD AUTO: 182 K/UL (ref 150–350)
PMV BLD AUTO: 10.5 FL (ref 9.2–12.9)
POTASSIUM SERPL-SCNC: 4.1 MMOL/L (ref 3.5–5.1)
PROT SERPL-MCNC: 8.2 G/DL (ref 6–8.4)
RBC # BLD AUTO: 4.12 M/UL (ref 4–5.4)
SODIUM SERPL-SCNC: 136 MMOL/L (ref 136–145)
VANCOMYCIN SERPL-MCNC: 23.4 UG/ML
WBC # BLD AUTO: 8.04 K/UL (ref 3.9–12.7)

## 2020-11-23 PROCEDURE — 85652 RBC SED RATE AUTOMATED: CPT

## 2020-11-23 PROCEDURE — 99999 PR PBB SHADOW E&M-EST. PATIENT-LVL V: ICD-10-PCS | Mod: PBBFAC,,, | Performed by: NURSE PRACTITIONER

## 2020-11-23 PROCEDURE — 99024 PR POST-OP FOLLOW-UP VISIT: ICD-10-PCS | Mod: S$GLB,,, | Performed by: PODIATRIST

## 2020-11-23 PROCEDURE — 86140 C-REACTIVE PROTEIN: CPT

## 2020-11-23 PROCEDURE — 85025 COMPLETE CBC W/AUTO DIFF WBC: CPT

## 2020-11-23 PROCEDURE — 99999 PR PBB SHADOW E&M-EST. PATIENT-LVL IV: ICD-10-PCS | Mod: PBBFAC,,, | Performed by: PODIATRIST

## 2020-11-23 PROCEDURE — 99999 PR PBB SHADOW E&M-EST. PATIENT-LVL V: CPT | Mod: PBBFAC,,, | Performed by: NURSE PRACTITIONER

## 2020-11-23 PROCEDURE — 99213 OFFICE O/P EST LOW 20 MIN: CPT | Mod: S$GLB,,, | Performed by: NURSE PRACTITIONER

## 2020-11-23 PROCEDURE — 99213 PR OFFICE/OUTPT VISIT, EST, LEVL III, 20-29 MIN: ICD-10-PCS | Mod: S$GLB,,, | Performed by: NURSE PRACTITIONER

## 2020-11-23 PROCEDURE — 80202 ASSAY OF VANCOMYCIN: CPT

## 2020-11-23 PROCEDURE — 99024 POSTOP FOLLOW-UP VISIT: CPT | Mod: S$GLB,,, | Performed by: PODIATRIST

## 2020-11-23 PROCEDURE — 80053 COMPREHEN METABOLIC PANEL: CPT

## 2020-11-23 PROCEDURE — 36415 COLL VENOUS BLD VENIPUNCTURE: CPT

## 2020-11-23 PROCEDURE — 99999 PR PBB SHADOW E&M-EST. PATIENT-LVL IV: CPT | Mod: PBBFAC,,, | Performed by: PODIATRIST

## 2020-11-30 NOTE — PROGRESS NOTES
"  Patient presents approximately approximately 4 weeks status post right partial ray resection/amputation.  She is doing well minimal discomfort.  She is walking in a surgical shoe with a walker.  She is mostly nonambulatory at this point.  She has been keeping the area clean and dry with bandage intact.  The incisions are healing well with no signs of further infection however there is mild dehiscence noted to the wound with fibrotic and granular tissue noted.  Partial exposure of muscle tissue as well.  Area was debrided and healthy bleeding tissue was apparent.  The wound measures 3.0 cm x 0.5 cm by 1 cm.    Wound Debridement    Performed by: Billy Roblse DPM   Authorized by: Patient    Time out: Immediately prior to procedure a "time out" was called to verify the correct patient, procedure, equipment, support staff and site/side marked as required.   Consent Done?:  Yes (Verbal)  Local anesthesia used?: No       Wound Details:    Location:   right 1st metatarsal incision dehiscence     Type of Debridement:  Excisional       Length (cm):   3.0 cm       Width (cm):   0.5 cm       Depth (cm):   1 cm       Percent Debrided (%):  100             Depth of debridement:  Subcutaneous tissue    Tissue debrided:  Dermis, Epidermis and Subcutaneous    Devitalized tissue debrided:  Biofilm, Callus and Necrotic/Eschar    Instruments:  Blade, Curette and Nippers     Bleeding:  Minimal  Hemostasis Achieved: Yes    Method Used:  Pressure  Patient tolerance:  Patient tolerated the procedure well with no immediate complications    Right wound dressed with Aquacel Ag followed by foam padding cast padding and Coban.  Follow-up in 1 week.      "

## 2020-12-01 ENCOUNTER — TELEPHONE (OUTPATIENT)
Dept: INFECTIOUS DISEASES | Facility: CLINIC | Age: 65
End: 2020-12-01

## 2020-12-01 ENCOUNTER — LAB VISIT (OUTPATIENT)
Dept: LAB | Facility: HOSPITAL | Age: 65
End: 2020-12-01
Payer: MEDICARE

## 2020-12-01 ENCOUNTER — OFFICE VISIT (OUTPATIENT)
Dept: PODIATRY | Facility: CLINIC | Age: 65
End: 2020-12-01
Payer: MEDICARE

## 2020-12-01 VITALS
DIASTOLIC BLOOD PRESSURE: 86 MMHG | BODY MASS INDEX: 46.7 KG/M2 | WEIGHT: 272.06 LBS | SYSTOLIC BLOOD PRESSURE: 136 MMHG | HEART RATE: 73 BPM

## 2020-12-01 DIAGNOSIS — L97.412 DIABETIC ULCER OF RIGHT HEEL ASSOCIATED WITH TYPE 2 DIABETES MELLITUS, WITH FAT LAYER EXPOSED: ICD-10-CM

## 2020-12-01 DIAGNOSIS — M86.9 OSTEOMYELITIS OF RIGHT FOOT, UNSPECIFIED TYPE: ICD-10-CM

## 2020-12-01 DIAGNOSIS — Z79.2 RECEIVING INTRAVENOUS ANTIBIOTIC TREATMENT AS OUTPATIENT: ICD-10-CM

## 2020-12-01 DIAGNOSIS — Z51.81 THERAPEUTIC DRUG MONITORING: ICD-10-CM

## 2020-12-01 DIAGNOSIS — E11.621 DIABETIC ULCER OF RIGHT HEEL ASSOCIATED WITH TYPE 2 DIABETES MELLITUS, WITH FAT LAYER EXPOSED: ICD-10-CM

## 2020-12-01 DIAGNOSIS — L08.9 TOE INFECTION: ICD-10-CM

## 2020-12-01 DIAGNOSIS — R60.0 EDEMA OF RIGHT FOOT: ICD-10-CM

## 2020-12-01 LAB
ALBUMIN SERPL BCP-MCNC: 3.1 G/DL (ref 3.5–5.2)
ALP SERPL-CCNC: 78 U/L (ref 55–135)
ALT SERPL W/O P-5'-P-CCNC: 18 U/L (ref 10–44)
ANION GAP SERPL CALC-SCNC: 15 MMOL/L (ref 8–16)
AST SERPL-CCNC: 11 U/L (ref 10–40)
BASOPHILS # BLD AUTO: 0.04 K/UL (ref 0–0.2)
BASOPHILS NFR BLD: 0.6 % (ref 0–1.9)
BILIRUB SERPL-MCNC: 0.5 MG/DL (ref 0.1–1)
BUN SERPL-MCNC: 37 MG/DL (ref 8–23)
CALCIUM SERPL-MCNC: 9.5 MG/DL (ref 8.7–10.5)
CHLORIDE SERPL-SCNC: 92 MMOL/L (ref 95–110)
CO2 SERPL-SCNC: 24 MMOL/L (ref 23–29)
CREAT SERPL-MCNC: 7.7 MG/DL (ref 0.5–1.4)
CRP SERPL-MCNC: 21.6 MG/L (ref 0–8.2)
DIFFERENTIAL METHOD: ABNORMAL
EOSINOPHIL # BLD AUTO: 0.1 K/UL (ref 0–0.5)
EOSINOPHIL NFR BLD: 1.2 % (ref 0–8)
ERYTHROCYTE [DISTWIDTH] IN BLOOD BY AUTOMATED COUNT: 21.2 % (ref 11.5–14.5)
ERYTHROCYTE [SEDIMENTATION RATE] IN BLOOD BY WESTERGREN METHOD: 34 MM/HR (ref 0–36)
EST. GFR  (AFRICAN AMERICAN): 5.8 ML/MIN/1.73 M^2
EST. GFR  (NON AFRICAN AMERICAN): 5 ML/MIN/1.73 M^2
GLUCOSE SERPL-MCNC: 482 MG/DL (ref 70–110)
HCT VFR BLD AUTO: 36 % (ref 37–48.5)
HGB BLD-MCNC: 11 G/DL (ref 12–16)
IMM GRANULOCYTES # BLD AUTO: 0.03 K/UL (ref 0–0.04)
IMM GRANULOCYTES NFR BLD AUTO: 0.5 % (ref 0–0.5)
LYMPHOCYTES # BLD AUTO: 1.1 K/UL (ref 1–4.8)
LYMPHOCYTES NFR BLD: 16.7 % (ref 18–48)
MCH RBC QN AUTO: 28.4 PG (ref 27–31)
MCHC RBC AUTO-ENTMCNC: 30.6 G/DL (ref 32–36)
MCV RBC AUTO: 93 FL (ref 82–98)
MONOCYTES # BLD AUTO: 0.5 K/UL (ref 0.3–1)
MONOCYTES NFR BLD: 7.8 % (ref 4–15)
NEUTROPHILS # BLD AUTO: 4.7 K/UL (ref 1.8–7.7)
NEUTROPHILS NFR BLD: 73.2 % (ref 38–73)
NRBC BLD-RTO: 0 /100 WBC
PLATELET # BLD AUTO: 200 K/UL (ref 150–350)
PMV BLD AUTO: 10.8 FL (ref 9.2–12.9)
POTASSIUM SERPL-SCNC: 4.8 MMOL/L (ref 3.5–5.1)
PROT SERPL-MCNC: 7.4 G/DL (ref 6–8.4)
RBC # BLD AUTO: 3.87 M/UL (ref 4–5.4)
SODIUM SERPL-SCNC: 131 MMOL/L (ref 136–145)
VANCOMYCIN SERPL-MCNC: 22.9 UG/ML
WBC # BLD AUTO: 6.45 K/UL (ref 3.9–12.7)

## 2020-12-01 PROCEDURE — 36415 COLL VENOUS BLD VENIPUNCTURE: CPT

## 2020-12-01 PROCEDURE — 86140 C-REACTIVE PROTEIN: CPT

## 2020-12-01 PROCEDURE — 99024 PR POST-OP FOLLOW-UP VISIT: ICD-10-PCS | Mod: S$GLB,,, | Performed by: PODIATRIST

## 2020-12-01 PROCEDURE — 99999 PR PBB SHADOW E&M-EST. PATIENT-LVL IV: ICD-10-PCS | Mod: PBBFAC,,, | Performed by: PODIATRIST

## 2020-12-01 PROCEDURE — 85652 RBC SED RATE AUTOMATED: CPT

## 2020-12-01 PROCEDURE — 99999 PR PBB SHADOW E&M-EST. PATIENT-LVL IV: CPT | Mod: PBBFAC,,, | Performed by: PODIATRIST

## 2020-12-01 PROCEDURE — 29580 STRAPPING UNNA BOOT: CPT | Mod: 58,RT,S$GLB, | Performed by: PODIATRIST

## 2020-12-01 PROCEDURE — 85025 COMPLETE CBC W/AUTO DIFF WBC: CPT

## 2020-12-01 PROCEDURE — 80202 ASSAY OF VANCOMYCIN: CPT

## 2020-12-01 PROCEDURE — 29580 PR STRAPPING UNNA BOOT: ICD-10-PCS | Mod: 58,RT,S$GLB, | Performed by: PODIATRIST

## 2020-12-01 PROCEDURE — 99024 POSTOP FOLLOW-UP VISIT: CPT | Mod: S$GLB,,, | Performed by: PODIATRIST

## 2020-12-01 PROCEDURE — 80053 COMPREHEN METABOLIC PANEL: CPT

## 2020-12-02 ENCOUNTER — TELEPHONE (OUTPATIENT)
Dept: INFECTIOUS DISEASES | Facility: HOSPITAL | Age: 65
End: 2020-12-02

## 2020-12-04 NOTE — PROGRESS NOTES
"  Patient presents approximately approximately 4 weeks status post right partial ray resection/amputation.  She is doing well minimal discomfort.  She is walking in a surgical shoe with a walker.  She is mostly nonambulatory at this point.  She has been keeping the area clean and dry with bandage intact.  The incisions are healing well with no signs of further infection however there is mild dehiscence noted to the wound with fibrotic and granular tissue noted.  Partial exposure of muscle tissue as well.  Area was debrided and healthy bleeding tissue was apparent.  The wound measures 2.6 cm x 0.5cm by 0.5 cm.    Wound Debridement    Performed by: Billy Robles DPM   Authorized by: Patient    Time out: Immediately prior to procedure a "time out" was called to verify the correct patient, procedure, equipment, support staff and site/side marked as required.   Consent Done?:  Yes (Verbal)  Local anesthesia used?: No       Wound Details:    Location:   right 1st metatarsal incision dehiscence     Type of Debridement:  Excisional       Length (cm):   2.6 cm       Width (cm):   0.5 cm       Depth (cm):   0.5 cm       Percent Debrided (%):  100             Depth of debridement:  Subcutaneous tissue    Tissue debrided:  Dermis, Epidermis and Subcutaneous    Devitalized tissue debrided:  Biofilm, Callus and Necrotic/Eschar    Instruments:  Blade, Curette and Nippers     Bleeding:  Minimal  Hemostasis Achieved: Yes    Method Used:  Pressure  Patient tolerance:  Patient tolerated the procedure well with no immediate complications    Right wound dressed with Aquacel Ag followed by foam padding cast padding and compression.    With patient's permission, I applied an Unna boot dressing (bi-layered pink Coflex brand)  using a spiral technique beginning with the foam layer followed by the cohesive bandage avoiding creases or folds.  The wrap was started behind the first metatarsal and ended below the tibial tubercle of the knee. "  There was overlap of each turn half the width of the previous turn in order to better control edema. Patient tolerated well and related comfort to the right lower extremity .   Follow-up in 1 week.

## 2020-12-07 ENCOUNTER — TELEPHONE (OUTPATIENT)
Dept: PODIATRY | Facility: CLINIC | Age: 65
End: 2020-12-07

## 2020-12-07 ENCOUNTER — PATIENT MESSAGE (OUTPATIENT)
Dept: INTERNAL MEDICINE | Facility: CLINIC | Age: 65
End: 2020-12-07

## 2020-12-07 DIAGNOSIS — M47.819 SPONDYLOSIS WITHOUT MYELOPATHY: Primary | ICD-10-CM

## 2020-12-07 DIAGNOSIS — Z79.2 RECEIVING INTRAVENOUS ANTIBIOTIC TREATMENT AS OUTPATIENT: Primary | ICD-10-CM

## 2020-12-07 NOTE — TELEPHONE ENCOUNTER
Nurse called pt to give upcoming appt information no answer Left message on pt personal voicemail with information

## 2020-12-08 ENCOUNTER — OFFICE VISIT (OUTPATIENT)
Dept: INFECTIOUS DISEASES | Facility: CLINIC | Age: 65
End: 2020-12-08
Payer: MEDICARE

## 2020-12-08 ENCOUNTER — LAB VISIT (OUTPATIENT)
Dept: LAB | Facility: HOSPITAL | Age: 65
End: 2020-12-08
Payer: MEDICARE

## 2020-12-08 ENCOUNTER — OFFICE VISIT (OUTPATIENT)
Dept: PODIATRY | Facility: CLINIC | Age: 65
End: 2020-12-08
Payer: MEDICARE

## 2020-12-08 VITALS
WEIGHT: 272.06 LBS | HEART RATE: 86 BPM | BODY MASS INDEX: 46.7 KG/M2 | DIASTOLIC BLOOD PRESSURE: 74 MMHG | SYSTOLIC BLOOD PRESSURE: 122 MMHG

## 2020-12-08 DIAGNOSIS — Z79.4 TYPE 2 DIABETES MELLITUS WITH CHRONIC KIDNEY DISEASE ON CHRONIC DIALYSIS, WITH LONG-TERM CURRENT USE OF INSULIN: ICD-10-CM

## 2020-12-08 DIAGNOSIS — Z79.2 RECEIVING INTRAVENOUS ANTIBIOTIC TREATMENT AS OUTPATIENT: ICD-10-CM

## 2020-12-08 DIAGNOSIS — N18.6 ESRD (END STAGE RENAL DISEASE) ON DIALYSIS: ICD-10-CM

## 2020-12-08 DIAGNOSIS — Z99.2 ESRD (END STAGE RENAL DISEASE) ON DIALYSIS: ICD-10-CM

## 2020-12-08 DIAGNOSIS — N18.6 TYPE 2 DIABETES MELLITUS WITH CHRONIC KIDNEY DISEASE ON CHRONIC DIALYSIS, WITH LONG-TERM CURRENT USE OF INSULIN: ICD-10-CM

## 2020-12-08 DIAGNOSIS — I87.2 CHRONIC VENOUS INSUFFICIENCY: ICD-10-CM

## 2020-12-08 DIAGNOSIS — M86.071 ACUTE HEMATOGENOUS OSTEOMYELITIS OF RIGHT FOOT: Primary | ICD-10-CM

## 2020-12-08 DIAGNOSIS — E11.22 TYPE 2 DIABETES MELLITUS WITH CHRONIC KIDNEY DISEASE ON CHRONIC DIALYSIS, WITH LONG-TERM CURRENT USE OF INSULIN: ICD-10-CM

## 2020-12-08 DIAGNOSIS — M86.9 OSTEOMYELITIS OF RIGHT FOOT, UNSPECIFIED TYPE: ICD-10-CM

## 2020-12-08 DIAGNOSIS — Z51.81 THERAPEUTIC DRUG MONITORING: ICD-10-CM

## 2020-12-08 DIAGNOSIS — Z99.2 TYPE 2 DIABETES MELLITUS WITH CHRONIC KIDNEY DISEASE ON CHRONIC DIALYSIS, WITH LONG-TERM CURRENT USE OF INSULIN: ICD-10-CM

## 2020-12-08 DIAGNOSIS — L97.523 ULCERATED, FOOT, LEFT, WITH NECROSIS OF MUSCLE: Primary | ICD-10-CM

## 2020-12-08 LAB
ANION GAP SERPL CALC-SCNC: 17 MMOL/L (ref 8–16)
BASOPHILS # BLD AUTO: 0.05 K/UL (ref 0–0.2)
BASOPHILS NFR BLD: 0.9 % (ref 0–1.9)
BUN SERPL-MCNC: 39 MG/DL (ref 8–23)
CALCIUM SERPL-MCNC: 9 MG/DL (ref 8.7–10.5)
CHLORIDE SERPL-SCNC: 96 MMOL/L (ref 95–110)
CO2 SERPL-SCNC: 21 MMOL/L (ref 23–29)
CREAT SERPL-MCNC: 7.7 MG/DL (ref 0.5–1.4)
CRP SERPL-MCNC: 16.4 MG/L (ref 0–8.2)
DIFFERENTIAL METHOD: ABNORMAL
EOSINOPHIL # BLD AUTO: 0.1 K/UL (ref 0–0.5)
EOSINOPHIL NFR BLD: 1.6 % (ref 0–8)
ERYTHROCYTE [DISTWIDTH] IN BLOOD BY AUTOMATED COUNT: 19.7 % (ref 11.5–14.5)
ERYTHROCYTE [SEDIMENTATION RATE] IN BLOOD BY WESTERGREN METHOD: 59 MM/HR (ref 0–36)
EST. GFR  (AFRICAN AMERICAN): 5.8 ML/MIN/1.73 M^2
EST. GFR  (NON AFRICAN AMERICAN): 5 ML/MIN/1.73 M^2
GLUCOSE SERPL-MCNC: 156 MG/DL (ref 70–110)
HCT VFR BLD AUTO: 36.2 % (ref 37–48.5)
HGB BLD-MCNC: 11.1 G/DL (ref 12–16)
IMM GRANULOCYTES # BLD AUTO: 0.02 K/UL (ref 0–0.04)
IMM GRANULOCYTES NFR BLD AUTO: 0.4 % (ref 0–0.5)
LYMPHOCYTES # BLD AUTO: 1.1 K/UL (ref 1–4.8)
LYMPHOCYTES NFR BLD: 18.7 % (ref 18–48)
MCH RBC QN AUTO: 28 PG (ref 27–31)
MCHC RBC AUTO-ENTMCNC: 30.7 G/DL (ref 32–36)
MCV RBC AUTO: 91 FL (ref 82–98)
MONOCYTES # BLD AUTO: 0.6 K/UL (ref 0.3–1)
MONOCYTES NFR BLD: 10.1 % (ref 4–15)
NEUTROPHILS # BLD AUTO: 3.8 K/UL (ref 1.8–7.7)
NEUTROPHILS NFR BLD: 68.3 % (ref 38–73)
NRBC BLD-RTO: 0 /100 WBC
PLATELET # BLD AUTO: 196 K/UL (ref 150–350)
PMV BLD AUTO: 10.7 FL (ref 9.2–12.9)
POTASSIUM SERPL-SCNC: 4.9 MMOL/L (ref 3.5–5.1)
RBC # BLD AUTO: 3.96 M/UL (ref 4–5.4)
SODIUM SERPL-SCNC: 134 MMOL/L (ref 136–145)
VANCOMYCIN SERPL-MCNC: 21.7 UG/ML
WBC # BLD AUTO: 5.62 K/UL (ref 3.9–12.7)

## 2020-12-08 PROCEDURE — 99499 UNLISTED E&M SERVICE: CPT | Mod: S$GLB,,, | Performed by: PODIATRIST

## 2020-12-08 PROCEDURE — 99999 PR PBB SHADOW E&M-EST. PATIENT-LVL IV: CPT | Mod: PBBFAC,,, | Performed by: PODIATRIST

## 2020-12-08 PROCEDURE — 36415 COLL VENOUS BLD VENIPUNCTURE: CPT

## 2020-12-08 PROCEDURE — 87075 CULTR BACTERIA EXCEPT BLOOD: CPT

## 2020-12-08 PROCEDURE — 99213 OFFICE O/P EST LOW 20 MIN: CPT | Mod: S$GLB,,, | Performed by: NURSE PRACTITIONER

## 2020-12-08 PROCEDURE — 99213 PR OFFICE/OUTPT VISIT, EST, LEVL III, 20-29 MIN: ICD-10-PCS | Mod: S$GLB,,, | Performed by: NURSE PRACTITIONER

## 2020-12-08 PROCEDURE — 99024 PR POST-OP FOLLOW-UP VISIT: ICD-10-PCS | Mod: S$GLB,,, | Performed by: PODIATRIST

## 2020-12-08 PROCEDURE — 85025 COMPLETE CBC W/AUTO DIFF WBC: CPT

## 2020-12-08 PROCEDURE — 87070 CULTURE OTHR SPECIMN AEROBIC: CPT

## 2020-12-08 PROCEDURE — 99499 NO LOS: ICD-10-PCS | Mod: S$GLB,,, | Performed by: PODIATRIST

## 2020-12-08 PROCEDURE — 86140 C-REACTIVE PROTEIN: CPT

## 2020-12-08 PROCEDURE — 99999 PR PBB SHADOW E&M-EST. PATIENT-LVL IV: ICD-10-PCS | Mod: PBBFAC,,, | Performed by: PODIATRIST

## 2020-12-08 PROCEDURE — 99999 PR PBB SHADOW E&M-EST. PATIENT-LVL III: CPT | Mod: PBBFAC,,, | Performed by: NURSE PRACTITIONER

## 2020-12-08 PROCEDURE — 80048 BASIC METABOLIC PNL TOTAL CA: CPT

## 2020-12-08 PROCEDURE — 99024 POSTOP FOLLOW-UP VISIT: CPT | Mod: S$GLB,,, | Performed by: PODIATRIST

## 2020-12-08 PROCEDURE — 99999 PR PBB SHADOW E&M-EST. PATIENT-LVL III: ICD-10-PCS | Mod: PBBFAC,,, | Performed by: NURSE PRACTITIONER

## 2020-12-08 PROCEDURE — 85652 RBC SED RATE AUTOMATED: CPT

## 2020-12-08 PROCEDURE — 80202 ASSAY OF VANCOMYCIN: CPT

## 2020-12-08 RX ORDER — ACETAMINOPHEN AND CODEINE PHOSPHATE 300; 30 MG/1; MG/1
1 TABLET ORAL EVERY 8 HOURS PRN
Qty: 90 TABLET | Refills: 1 | Status: ON HOLD | OUTPATIENT
Start: 2020-12-08 | End: 2021-01-01 | Stop reason: HOSPADM

## 2020-12-08 RX ORDER — ACETAMINOPHEN 325 MG/1
650 TABLET ORAL EVERY 6 HOURS PRN
Qty: 90 TABLET | Refills: 1 | Status: ON HOLD | OUTPATIENT
Start: 2020-12-08 | End: 2021-01-01 | Stop reason: HOSPADM

## 2020-12-08 NOTE — PROGRESS NOTES
Subjective:      Patient ID: Kylie King is a 65 y.o. female.    Chief Complaint:No chief complaint on file.      History of Present Illness    {ID \[facility] [place] [facility]\"" BLOCKS:11540}    ROS  Objective:   Physical Exam  Assessment:       1. Receiving intravenous antibiotic treatment as outpatient          Plan:       ***

## 2020-12-08 NOTE — TELEPHONE ENCOUNTER
----- Message from Sonja Reina sent at 12/8/2020 10:54 AM CST -----  Contact: [phone]  Patient called, requested a courtesy call from Dr Foote's nurse about having questions on medication Tylenol 3. Please call and advise. Thank you.

## 2020-12-09 NOTE — PROGRESS NOTES
"  Patient presents approximately approximately status post right partial ray resection/amputation.  She is doing well minimal discomfort.  She is walking in a surgical shoe with a walker.  She is mostly nonambulatory at this point.  She has been keeping the area clean and dry with bandage intact.  The incisions are healing well with no signs of further infection however the ulceration is improving with increased granular tissue and decreasing in size.  There was identified by Infectious Disease a small circular shape discoloration to the plantar aspect of the right 1st metatarsophalangeal joint.  Upon further inspection and debridement this is an area of ulceration as well that tunnels however not to the level of bone.  This most likely is drainage from the dorsal ulceration in the same area however no purulence was noted upon debridement.  Cultures were taken and sent for both aerobic and anaerobic testing.  No exposure of muscle tissue noted at this point.  Area was debrided and healthy bleeding tissue was apparent.  The wound measures 2.0 cm x 0.3cm by 0.2 cm.    Wound Debridement    Performed by: Billy Robles DPM   Authorized by: Patient    Time out: Immediately prior to procedure a "time out" was called to verify the correct patient, procedure, equipment, support staff and site/side marked as required.   Consent Done?:  Yes (Verbal)  Local anesthesia used?: No       Wound Details:    Location:   right 1st metatarsal incision dehiscence     Type of Debridement:  Excisional       Length (cm):   2.0 cm       Width (cm):   0.3 cm       Depth (cm):   0.2 cm       Percent Debrided (%):  100             Depth of debridement:  Subcutaneous tissue    Tissue debrided:  Dermis, Epidermis and Subcutaneous    Devitalized tissue debrided:  Biofilm, Callus and Necrotic/Eschar    Instruments:  Blade, Curette and Nippers     Bleeding:  Minimal  Hemostasis Achieved: Yes    Method Used:  Pressure  Patient tolerance:  Patient " tolerated the procedure well with no immediate complications    Right wound dressed with Aquacel Ag packing plantarly followed by Medihoney dorsally followed by foam padding cast padding and compression.    Follow-up in 1 week.

## 2020-12-09 NOTE — PROGRESS NOTES
Subjective:      Patient ID: Kylie King is a 65 y.o. female.    Chief Complaint:Follow-up and Osteomyelitis       History of Present Illness     Ms. King is a 65 year old woman with DM, ESRD on HD, HTN, COPD, HFpEF, AD, chronic venous insufficiency who presented 10/13 with right foot wound/gangrene s/p partial 1st ray amputation and closure of 2nd toe on 10/16/2020. Clean margin bone cultures +Staph intermedius and Proteus. She apparently had SFA stent placed on 9/17.  MRI concerning for osteomyelitis of right hallux. Blood cultures NGTD. She was discharged on cefepime alone given culture data. Her cultures from her bone specimen also grew staph intermedius.         IV vancomycin added first follow up visit as S. Intermedius was oxacillin R.         Date Baseline 10/20 11/3 11/10 11/17 11/23 12/1 12/8   WBC   8.0 6.5 7.5  8.04 6.4 5.6   H/H   9/31 10/35 10/32  11/38 11/36 11/36   PLT   295 226 204  182 200 196   Creat   HD HD HD HD HD Hd HD   AST   16 15 14 15  11    ALT   28 19 19 21  18    ALP   71 79 2 93  34    ESR 85 66 3 35 56 33 34 59    68 30.1 28.9 20.6 17.2 21.6 16.4   Random vanc      23.4 22.9 22.7     IV abx extended last visit given slow wound healing and persistent elevation in inflammatory markers.  She has had approx 8 weeks of cefepime, but 5 weeks of IV vancomycin     I am seeing her today with Podiatry. She is accompanied by her .  Wound appears improved from last visit. She does have new wound to plantar aspect of same foot.  Probes, but not to bone.  New cultures sent.  Denies fevers, chills, sweats.  Complaining of more fatigue after doses of antibiotics with dialysis.      Blood sugars better controlled.  Been in 200s.  Blood pressure also much better controlled.  Reports systolic b/p in 120s.        Review of Systems   Constitution: Positive for malaise/fatigue. Negative for chills, decreased appetite, diaphoresis, fever and night sweats.   HENT: Positive for  congestion.    Eyes: Negative for blurred vision and visual disturbance.   Cardiovascular: Negative for chest pain, leg swelling and palpitations.   Respiratory: Negative for cough, shortness of breath and sputum production.    Hematologic/Lymphatic: Negative for adenopathy.   Skin: Positive for poor wound healing. Negative for rash.   Musculoskeletal: Negative for joint pain and joint swelling.   Gastrointestinal: Negative for abdominal pain, diarrhea, nausea and vomiting.   Neurological: Positive for numbness (diabetic neuropathy) and paresthesias. Negative for dizziness, focal weakness, headaches and weakness.     Objective:   Physical Exam  Vitals signs reviewed.   Constitutional:       General: She is not in acute distress.     Appearance: She is not diaphoretic.      Comments:      Eyes:      General: No scleral icterus.     Conjunctiva/sclera: Conjunctivae normal.   Cardiovascular:      Rate and Rhythm: Normal rate and regular rhythm.      Heart sounds: Murmur present.   Pulmonary:      Effort: Pulmonary effort is normal. No respiratory distress.      Breath sounds: Normal breath sounds. No rales.   Abdominal:      Palpations: Abdomen is soft.      Tenderness: There is no abdominal tenderness.   Musculoskeletal:      Comments: Wound improving. Smaller.  Healthy granular base.    New plantar wound right foot.  Does not probe to bone or track to prior.  No surrounding erythema. No purulence.   New cultures sent.    Skin:     General: Skin is warm and dry.      Findings: No rash.      Comments: RUE AVF - site clear    Chronic skin changes bilateral lower extremities     Neurological:      Mental Status: She is alert and oriented to person, place, and time.   Psychiatric:         Behavior: Behavior normal.       12/8 12/8 11/23        Assessment:       1. Acute hematogenous osteomyelitis of right foot    2. Receiving intravenous antibiotic treatment as outpatient    3. Chronic venous insufficiency     4. ESRD (end stage renal disease) on dialysis    5. Type 2 diabetes mellitus with chronic kidney disease on chronic dialysis, with long-term current use of insulin         Wound improved this visit, but new wound to plantar aspect of foot.  Had trend up in inflammatory markers last week, improved this week. New wound today.  New cultures sent.  Will extend IV abx after dialysis pending new cultures.  Consider stopping cefepime and continuing vancomycin as this was not added until later in therapy and has not had 6 weeks for Staph Intermedius.        Plan:     1. Extend IV vancomycin and cefepime extend X 2 more weeks.  Orders sent to [facility].    2. Continue weekly labs.  Ordered.   3.  ID follow up in 7 days. Will coordinate with Podiatry.   4. At next visit, after review of new cultures and labs next week, will consider antibiotic d/c  5.  Continue leg elevation   6.  Call for any concerns.  Patient has my contact information.

## 2020-12-10 LAB — BACTERIA SPEC AEROBE CULT: NORMAL

## 2020-12-15 ENCOUNTER — TELEPHONE (OUTPATIENT)
Dept: INFECTIOUS DISEASES | Facility: HOSPITAL | Age: 65
End: 2020-12-15

## 2020-12-15 ENCOUNTER — TELEPHONE (OUTPATIENT)
Dept: PODIATRY | Facility: CLINIC | Age: 65
End: 2020-12-15

## 2020-12-15 LAB — BACTERIA SPEC ANAEROBE CULT: NORMAL

## 2020-12-15 NOTE — TELEPHONE ENCOUNTER
Received call from patient that her Podiatry appt was canceled today. I was scheduled to see her with Podiatry.    New Wound cultures showing skin juan j only   She is due for labs today.  Will re-schedule to be drawn at [place] for patient convenience and follow up on re-scheduled Podiatry appointment and re-schedule ID appt.   Due to end all IV abx.  Will review labs.

## 2020-12-17 LAB
ACID FAST MOD KINY STN SPEC: NORMAL
MYCOBACTERIUM SPEC QL CULT: NORMAL

## 2020-12-18 ENCOUNTER — TELEPHONE (OUTPATIENT)
Dept: INFECTIOUS DISEASES | Facility: CLINIC | Age: 65
End: 2020-12-18

## 2020-12-18 ENCOUNTER — TELEPHONE (OUTPATIENT)
Dept: PODIATRY | Facility: CLINIC | Age: 65
End: 2020-12-18

## 2020-12-18 DIAGNOSIS — Z79.2 RECEIVING INTRAVENOUS ANTIBIOTIC TREATMENT AS OUTPATIENT: Primary | ICD-10-CM

## 2020-12-18 NOTE — TELEPHONE ENCOUNTER
Called patient several times to inform her of her f/u appointment with DR. Robles on Monday at 9:45pm but patient didn't answer the phone.

## 2020-12-18 NOTE — TELEPHONE ENCOUNTER
----- Message from Tayla Childress sent at 12/18/2020  4:24 PM CST -----  [phone]  ----- Message -----  From: Daniela Reyes MA  Sent: 12/18/2020   4:09 PM CST  To: Tayla Childress    Whats the call back number?  ----- Message -----  From: Tayla Childress  Sent: 12/18/2020   4:08 PM CST  To: Yamini Leonardo Staff    Pt states she need to discuss an apt for next Tuesday please call back to discuss

## 2020-12-18 NOTE — TELEPHONE ENCOUNTER
----- Message from Lonnie Pimentel sent at 12/18/2020  9:46 AM CST -----  Pt asking for a callback regarding to scheduling an appt she states she never heard back from you please contact pt             Contact info [phone]

## 2020-12-18 NOTE — TELEPHONE ENCOUNTER
Called patient and scheduled appointment with emily to see with wound care and labs at 8 am prior to appointment. Patient agreed to both appointments date/times.

## 2020-12-22 ENCOUNTER — OFFICE VISIT (OUTPATIENT)
Dept: INFECTIOUS DISEASES | Facility: CLINIC | Age: 65
End: 2020-12-22
Payer: MEDICARE

## 2020-12-22 ENCOUNTER — OFFICE VISIT (OUTPATIENT)
Dept: PODIATRY | Facility: CLINIC | Age: 65
End: 2020-12-22
Payer: MEDICARE

## 2020-12-22 ENCOUNTER — PATIENT OUTREACH (OUTPATIENT)
Dept: ADMINISTRATIVE | Facility: OTHER | Age: 65
End: 2020-12-22

## 2020-12-22 ENCOUNTER — OFFICE VISIT (OUTPATIENT)
Dept: VASCULAR SURGERY | Facility: CLINIC | Age: 65
End: 2020-12-22
Payer: MEDICARE

## 2020-12-22 ENCOUNTER — HOSPITAL ENCOUNTER (OUTPATIENT)
Dept: VASCULAR SURGERY | Facility: CLINIC | Age: 65
Discharge: HOME | End: 2020-12-22
Attending: SURGERY
Payer: MEDICARE

## 2020-12-22 VITALS
BODY MASS INDEX: 46.67 KG/M2 | TEMPERATURE: 99 F | SYSTOLIC BLOOD PRESSURE: 159 MMHG | WEIGHT: 273.38 LBS | HEIGHT: 64 IN | DIASTOLIC BLOOD PRESSURE: 104 MMHG | HEART RATE: 83 BPM

## 2020-12-22 VITALS — HEART RATE: 73 BPM | SYSTOLIC BLOOD PRESSURE: 124 MMHG | DIASTOLIC BLOOD PRESSURE: 81 MMHG

## 2020-12-22 VITALS
BODY MASS INDEX: 46.7 KG/M2 | HEART RATE: 73 BPM | WEIGHT: 272.06 LBS | DIASTOLIC BLOOD PRESSURE: 81 MMHG | SYSTOLIC BLOOD PRESSURE: 124 MMHG

## 2020-12-22 DIAGNOSIS — L97.523 ULCERATED, FOOT, LEFT, WITH NECROSIS OF MUSCLE: Primary | ICD-10-CM

## 2020-12-22 DIAGNOSIS — Z99.2 ESRD (END STAGE RENAL DISEASE) ON DIALYSIS: Primary | ICD-10-CM

## 2020-12-22 DIAGNOSIS — Z99.2 ESRD (END STAGE RENAL DISEASE) ON DIALYSIS: ICD-10-CM

## 2020-12-22 DIAGNOSIS — Z79.2 RECEIVING INTRAVENOUS ANTIBIOTIC TREATMENT AS OUTPATIENT: Primary | ICD-10-CM

## 2020-12-22 DIAGNOSIS — Z99.2 TYPE 2 DIABETES MELLITUS WITH CHRONIC KIDNEY DISEASE ON CHRONIC DIALYSIS, WITH LONG-TERM CURRENT USE OF INSULIN: ICD-10-CM

## 2020-12-22 DIAGNOSIS — Z79.4 TYPE 2 DIABETES MELLITUS WITH CHRONIC KIDNEY DISEASE ON CHRONIC DIALYSIS, WITH LONG-TERM CURRENT USE OF INSULIN: ICD-10-CM

## 2020-12-22 DIAGNOSIS — L97.412 DIABETIC ULCER OF RIGHT HEEL ASSOCIATED WITH TYPE 2 DIABETES MELLITUS, WITH FAT LAYER EXPOSED: ICD-10-CM

## 2020-12-22 DIAGNOSIS — M86.071 ACUTE HEMATOGENOUS OSTEOMYELITIS OF RIGHT FOOT: ICD-10-CM

## 2020-12-22 DIAGNOSIS — R60.0 EDEMA OF RIGHT FOOT: ICD-10-CM

## 2020-12-22 DIAGNOSIS — N18.6 ESRD (END STAGE RENAL DISEASE) ON DIALYSIS: ICD-10-CM

## 2020-12-22 DIAGNOSIS — N18.6 ESRD (END STAGE RENAL DISEASE) ON DIALYSIS: Primary | ICD-10-CM

## 2020-12-22 DIAGNOSIS — E11.621 DIABETIC ULCER OF RIGHT HEEL ASSOCIATED WITH TYPE 2 DIABETES MELLITUS, WITH FAT LAYER EXPOSED: ICD-10-CM

## 2020-12-22 DIAGNOSIS — I87.2 CHRONIC VENOUS INSUFFICIENCY: ICD-10-CM

## 2020-12-22 DIAGNOSIS — N18.6 TYPE 2 DIABETES MELLITUS WITH CHRONIC KIDNEY DISEASE ON CHRONIC DIALYSIS, WITH LONG-TERM CURRENT USE OF INSULIN: ICD-10-CM

## 2020-12-22 DIAGNOSIS — E11.22 TYPE 2 DIABETES MELLITUS WITH CHRONIC KIDNEY DISEASE ON CHRONIC DIALYSIS, WITH LONG-TERM CURRENT USE OF INSULIN: ICD-10-CM

## 2020-12-22 DIAGNOSIS — I96 TOE GANGRENE: Primary | ICD-10-CM

## 2020-12-22 DIAGNOSIS — I73.9 PVD (PERIPHERAL VASCULAR DISEASE): ICD-10-CM

## 2020-12-22 PROCEDURE — 93923 PR NON-INVASIVE PHYSIOLOGIC STUDY EXTREMITY 3 LEVELS: ICD-10-PCS | Mod: S$GLB,,, | Performed by: SURGERY

## 2020-12-22 PROCEDURE — 93990 DOPPLER FLOW TESTING: CPT | Mod: 59,S$GLB,, | Performed by: SURGERY

## 2020-12-22 PROCEDURE — 99999 PR PBB SHADOW E&M-EST. PATIENT-LVL II: ICD-10-PCS | Mod: PBBFAC,,, | Performed by: PODIATRIST

## 2020-12-22 PROCEDURE — 99999 PR PBB SHADOW E&M-EST. PATIENT-LVL III: CPT | Mod: PBBFAC,,, | Performed by: SURGERY

## 2020-12-22 PROCEDURE — 99024 PR POST-OP FOLLOW-UP VISIT: ICD-10-PCS | Mod: S$GLB,,, | Performed by: PODIATRIST

## 2020-12-22 PROCEDURE — 99213 PR OFFICE/OUTPT VISIT, EST, LEVL III, 20-29 MIN: ICD-10-PCS | Mod: S$GLB,,, | Performed by: NURSE PRACTITIONER

## 2020-12-22 PROCEDURE — 99213 OFFICE O/P EST LOW 20 MIN: CPT | Mod: S$GLB,,, | Performed by: NURSE PRACTITIONER

## 2020-12-22 PROCEDURE — 99024 POSTOP FOLLOW-UP VISIT: CPT | Mod: S$GLB,,, | Performed by: PODIATRIST

## 2020-12-22 PROCEDURE — 99999 PR PBB SHADOW E&M-EST. PATIENT-LVL III: ICD-10-PCS | Mod: PBBFAC,,, | Performed by: SURGERY

## 2020-12-22 PROCEDURE — 99214 PR OFFICE/OUTPT VISIT, EST, LEVL IV, 30-39 MIN: ICD-10-PCS | Mod: S$GLB,,, | Performed by: SURGERY

## 2020-12-22 PROCEDURE — 99999 PR PBB SHADOW E&M-EST. PATIENT-LVL IV: CPT | Mod: PBBFAC,,, | Performed by: NURSE PRACTITIONER

## 2020-12-22 PROCEDURE — 99999 PR PBB SHADOW E&M-EST. PATIENT-LVL IV: ICD-10-PCS | Mod: PBBFAC,,, | Performed by: NURSE PRACTITIONER

## 2020-12-22 PROCEDURE — 93990 PR DUPLEX HEMODIALYSIS ACCESS: ICD-10-PCS | Mod: 59,S$GLB,, | Performed by: SURGERY

## 2020-12-22 PROCEDURE — 99214 OFFICE O/P EST MOD 30 MIN: CPT | Mod: S$GLB,,, | Performed by: SURGERY

## 2020-12-22 PROCEDURE — 93923 UPR/LXTR ART STDY 3+ LVLS: CPT | Mod: S$GLB,,, | Performed by: SURGERY

## 2020-12-22 PROCEDURE — 99999 PR PBB SHADOW E&M-EST. PATIENT-LVL II: CPT | Mod: PBBFAC,,, | Performed by: PODIATRIST

## 2020-12-22 NOTE — PROGRESS NOTES
Ref: CLEMENTINA Siegel    HISTORY OF PRESENT ILLNESS:  A 64-year-old female with new end-stage renal   disease,  Here for f/u.  It sounds as though her renal failure came on rather suddenly and was   hospitalized for some time.  She is still using supplemental oxygen, but she is   feeling better and can now lie flat.    Now s/p    1a.  R SFA stent 9/17/2020 for occlusion 9/17/2020  1. PTA, R AVF 9/12/18  2. Transposition, R AVF 8/15/18  3. Diagnostic fistulagram 7/18/18  4. R l[place] AVF 5/28/18.     9/22/2020:  This initial follow-up after the recent right SFA stent placement done for a gangrenous right 1st toe.  She was found to have a complete occlusion of the distal SFA which was successfully recanalized, had inline flow to the foot via the PT    12/22/2020:  This is a 3 month follow-up.  She underwent extended ray amputation the right 1st toe (podiatry) and had extended antibiotic for osteo.  She was seen by Dr. Robles today, I reviewed her those pictures, which is progressively healing amputation site.    She denies any problems using her right AV fistula.  She admits to an occasional hand cramp when she uses the her hand vigorously, which occurs perhaps once a week.  Otherwise she denies any numbness pain or weakness of the hand    PAST MEDICAL HISTORY:  1.  History of breast cancer.  2.  History of choledocholithiasis.  3.  Hypertension.  4.  Hyperlipidemia.  5.  History of intracerebral hemorrhage.  6.  History of pancreatitis.  7.  History of stroke.  8.  Type 2 diabetes.  9. ESRD dialyzing Monday/Wednesday/Friday  10. Severe COPD now off supplemental oxygen    PAST SURGICAL HISTORY:  1.  Sigmoidectomy.  2.  Cholecystectomy.  3.  Nephrectomy.  4.  Breast biopsies.    FAMILY HISTORY:  Positive for diabetes and heart disease.    SOCIAL HISTORY:  Current smoker.    MEDICATIONS:  No anticoagulants or antiplatelet agents.  See EPIC for full list.    ALLERGIES:  KEFLEX, ERYTHROMYCIN AND CYCLOBENZAPRINE.    REVIEW OF  SYSTEMS:  Denies postprandial pain or DVT.  All other systems   including eyes, ENT, respiratory, musculoskeletal, skin, psychiatric, heme,   lymph, allergy and immune are negative.    PHYSICAL EXAMINATION:  VITAL SIGNS:  See nursing note.  GENERAL:  She appears somewhat chronically ill   Resume normal effort.  Clear to auscultation.  CARDIAC:  Regular rate and rhythm.  Nondisplaced PMI, no murmur.  VASCULAR:  2+ radial and brachial pulses.  Right PT 2+, stable  EXTREMITIES:  Her arms are somewhat generous in girth.  R arm shows good  Thrill proximally, soft thrill in mid  with modest pulsatility.  Stable. Strong 2+ radial proximal to anastomosis which is quite distal.  Stable    Right foot is wrapped in Coban.     IMAGING:  ABIs are 0.89 right, 1.0 left, left PVRs consistent with bio occlusive disease only    AV fistula duplex 2019:  No stenosis, flow rate  955  Stable    Fistualgrams re-reviewed.   Small radial artery with large cephalic vein.   Do not feel there is a arterial stenosis    ASSESSMENT:     1.  Atherosclerosis with gangrene, right lower extremity.   Status post a right SFA stent .  She continues to have a palpable right posterior tibial pulse and excellent noninvasive studies suggesting good arterial flow to the right foot   2.  End-stage renal disease  3.  Severe COPD, but now off supplemental oxygen    PLAN:    1.  Three month follow-up with AV fistula duplex and ABIs    JIM Pollard III, MD, FACS  Professor and Chief, Vascular and Endovascular Surgery    CC: Yulisa Robles and Christal

## 2020-12-22 NOTE — PROGRESS NOTES
Subjective:      Patient ID: Kylie King is a 65 y.o. female.    Chief Complaint:Follow-up and Osteomyelitis       History of Present Illness     Ms. King is a 65 year old woman with DM, ESRD on HD, HTN, COPD, HFpEF, AD, chronic venous insufficiency who presented 10/13 with right foot wound/gangrene s/p partial 1st ray amputation and closure of 2nd toe on 10/16/2020. Clean margin bone cultures +Staph intermedius and Proteus. She apparently had SFA stent placed on 9/17.  MRI concerning for osteomyelitis of right hallux. Blood cultures NGTD. She was discharged on cefepime alone given culture data. Her cultures from her bone specimen also grew staph intermedius. IV vancomycin added first follow up visit as S. Intermedius was oxacillin R.         Date Baseline 10/20 11/3 11/10 11/17 11/23 12/1 12/8 12/15 12.22   WBC   8.0 6.5 7.5  8.04 6.4 5.6  5.9   H/H   9/31 10/35 10/32  11/38 11/36 11/36  12/40   PLT   295 226 204  182 200 196  193   Creat   HD HD HD HD HD Hd HD HD HD   AST   16 15 14 15  11 15 11    ALT   28 19 19 21  18  21 15   ALP   71 79 2 93  34  69 75   ESR 85 66 3 35 56 33 34 59 46 63    68 30.1 28.9 20.6 17.2 21.6 16.4 33 18   Random vanc      23.4 22.9 22.7 18 25     IV abx extended given slow wound healing and persistent elevation in inflammatory markers.  She has had approx 10 weeks of cefepime, but 7  weeks of IV vancomycin     I am seeing her today with Podiatry. She is accompanied by her .  Denies fevers/chills/sweats. Reports her sinuses have been acting up. Denies green/yellow drainage.  Very fatigued.  Been up late trying to wrap presents.  Right foot surgical site healing well, almost closed.  No signs of infection.    New wound to plantar foot last visit.  Cultures + for skin juan j.  Has been on IV vancomycin and cefepime with dialysis. No current signs of infection, still with some tracking, no malodor, purulence.  Does not probe to bone.  Denies pain.     HA1C elevated  from last time 9.2    Review of Systems   Constitution: Positive for malaise/fatigue. Negative for chills, decreased appetite, diaphoresis, fever and night sweats.   HENT: Positive for congestion.    Eyes: Negative for blurred vision and visual disturbance.   Cardiovascular: Negative for chest pain, leg swelling and palpitations.   Respiratory: Negative for cough, shortness of breath and sputum production.    Hematologic/Lymphatic: Negative for adenopathy.   Skin: Positive for poor wound healing. Negative for rash.   Musculoskeletal: Negative for joint pain and joint swelling.   Gastrointestinal: Negative for abdominal pain, diarrhea, nausea and vomiting.   Neurological: Positive for numbness (diabetic neuropathy) and paresthesias. Negative for dizziness, focal weakness, headaches and weakness.     Objective:   Physical Exam  Vitals signs reviewed.   Constitutional:       General: She is not in acute distress.     Appearance: She is not diaphoretic.      Comments:      Eyes:      General: No scleral icterus.     Conjunctiva/sclera: Conjunctivae normal.   Cardiovascular:      Rate and Rhythm: Normal rate and regular rhythm.      Heart sounds: Murmur present.   Pulmonary:      Effort: Pulmonary effort is normal. No respiratory distress.      Breath sounds: Normal breath sounds. No rales.   Abdominal:      Palpations: Abdomen is soft.      Tenderness: There is no abdominal tenderness.   Musculoskeletal:      Comments: Surgical wound continues to improve. Healthy granular base.    Plantar wound right foot.  Does not probe to bone   No surrounding erythema. No purulence. Still with some depth, but less than last visit.  See Podiatry notes and photos below.      Skin:     General: Skin is warm and dry.      Findings: No rash.      Comments: RUE AVF - site clear    Chronic skin changes bilateral lower extremities     Neurological:      Mental Status: She is alert and oriented to person, place, and time.   Psychiatric:          Behavior: Behavior normal.       12/22 12/8 12/8 11/23        Assessment:       1. Receiving intravenous antibiotic treatment as outpatient    2. Acute hematogenous osteomyelitis of right foot    3. Chronic venous insufficiency    4. ESRD (end stage renal disease) on dialysis    5. Type 2 diabetes mellitus with chronic kidney disease on chronic dialysis, with long-term current use of insulin         Amputation site continues to improve.  New wound to plantar foot stable, but not healed.   Has completed approx 10 weeks of IV cefepime, 7 weeks IV vancomycin .  CRP 18 (from baseline of 114).  No local or systemic signs of infection.  Recommend stopping antibiotics and observing closely with weekly Podiatry visit.      Plan:     1. Discontinue IV antibiotics and follow closely.  Order sent to [facility] ([place]).   2.   [facility] Wound Care.  Dr. Robles recommends Home Health for wound care. Patient declines and wishes to do herself.   3.   Follow up with Podiatry in 1 week.   4.    ID follow up 2 weeks or sooner as needed. Dr. Robles will call us if any concerns with next week's visit.   5.  Follow up with PCP regarding blood sugar control.  Discussed with Patient.  Today's HA1C ordered by PCP so will come to her attention.   6.   Call or seek immediate medical attention for any fevers, chills, sweats, diarrhea, n/v or increase in pain, swelling, drainage from wounds.  Patient has my contact information.

## 2020-12-22 NOTE — PROGRESS NOTES
LINKS immunization registry not responding  Care Everywhere updated  Health Maintenance updated  Chart reviewed for overdue Proactive [facility]coun[facility] ([facility]) health maintenance testing (CRS, Breast Ca, Diabetic Eye Exam)   Orders entered:N/A

## 2021-01-01 ENCOUNTER — HOSPITAL ENCOUNTER (OUTPATIENT)
Facility: HOSPITAL | Age: 66
Discharge: NURSING, REHAB OR LONG-TERM CARE | DRG: 189 | End: 2021-08-07
Attending: EMERGENCY MEDICINE | Admitting: EMERGENCY MEDICINE
Payer: MEDICARE

## 2021-01-01 ENCOUNTER — EXTERNAL HOSPITAL ADMISSION (OUTPATIENT)
Dept: SKILLED NURSING FACILITY | Facility: HOSPITAL | Age: 66
End: 2021-01-01
Payer: MEDICARE

## 2021-01-01 ENCOUNTER — TELEPHONE (OUTPATIENT)
Dept: INFECTIOUS DISEASES | Facility: CLINIC | Age: 66
End: 2021-01-01

## 2021-01-01 ENCOUNTER — PATIENT MESSAGE (OUTPATIENT)
Dept: INTERNAL MEDICINE | Facility: CLINIC | Age: 66
End: 2021-01-01

## 2021-01-01 ENCOUNTER — TELEPHONE (OUTPATIENT)
Dept: INTERNAL MEDICINE | Facility: CLINIC | Age: 66
End: 2021-01-01

## 2021-01-01 ENCOUNTER — HOSPITAL ENCOUNTER (INPATIENT)
Facility: HOSPITAL | Age: 66
LOS: 20 days | Discharge: NURSING, REHAB OR LONG-TERM CARE | DRG: 474 | End: 2021-08-02
Attending: EMERGENCY MEDICINE | Admitting: HOSPITALIST
Payer: MEDICARE

## 2021-01-01 ENCOUNTER — ANESTHESIA (OUTPATIENT)
Dept: SURGERY | Facility: HOSPITAL | Age: 66
DRG: 616 | End: 2021-01-01
Payer: MEDICARE

## 2021-01-01 ENCOUNTER — PATIENT MESSAGE (OUTPATIENT)
Dept: INTERNAL MEDICINE | Facility: CLINIC | Age: 66
End: 2021-01-01
Payer: COMMERCIAL

## 2021-01-01 ENCOUNTER — TELEPHONE (OUTPATIENT)
Dept: VASCULAR SURGERY | Facility: CLINIC | Age: 66
End: 2021-01-01

## 2021-01-01 ENCOUNTER — OFFICE VISIT (OUTPATIENT)
Dept: PODIATRY | Facility: CLINIC | Age: 66
End: 2021-01-01
Payer: MEDICARE

## 2021-01-01 ENCOUNTER — PATIENT MESSAGE (OUTPATIENT)
Dept: ADMINISTRATIVE | Facility: CLINIC | Age: 66
End: 2021-01-01
Payer: COMMERCIAL

## 2021-01-01 ENCOUNTER — PATIENT OUTREACH (OUTPATIENT)
Dept: ADMINISTRATIVE | Facility: OTHER | Age: 66
End: 2021-01-01

## 2021-01-01 ENCOUNTER — PATIENT MESSAGE (OUTPATIENT)
Dept: PODIATRY | Facility: CLINIC | Age: 66
End: 2021-01-01

## 2021-01-01 ENCOUNTER — TELEPHONE (OUTPATIENT)
Dept: INFECTIOUS DISEASES | Facility: HOSPITAL | Age: 66
End: 2021-01-01

## 2021-01-01 ENCOUNTER — TELEPHONE (OUTPATIENT)
Dept: PODIATRY | Facility: CLINIC | Age: 66
End: 2021-01-01

## 2021-01-01 ENCOUNTER — OFFICE VISIT (OUTPATIENT)
Dept: INFECTIOUS DISEASES | Facility: CLINIC | Age: 66
End: 2021-01-01
Payer: MEDICARE

## 2021-01-01 ENCOUNTER — TELEPHONE (OUTPATIENT)
Dept: INTERNAL MEDICINE | Facility: CLINIC | Age: 66
End: 2021-01-01
Payer: COMMERCIAL

## 2021-01-01 ENCOUNTER — PATIENT MESSAGE (OUTPATIENT)
Dept: ADMINISTRATIVE | Facility: OTHER | Age: 66
End: 2021-01-01

## 2021-01-01 ENCOUNTER — LAB VISIT (OUTPATIENT)
Dept: LAB | Facility: OTHER | Age: 66
End: 2021-01-01
Payer: COMMERCIAL

## 2021-01-01 ENCOUNTER — TELEPHONE (OUTPATIENT)
Dept: ELECTROPHYSIOLOGY | Facility: CLINIC | Age: 66
End: 2021-01-01
Payer: COMMERCIAL

## 2021-01-01 ENCOUNTER — ANESTHESIA EVENT (OUTPATIENT)
Dept: SURGERY | Facility: HOSPITAL | Age: 66
DRG: 474 | End: 2021-01-01
Payer: MEDICARE

## 2021-01-01 ENCOUNTER — OFFICE VISIT (OUTPATIENT)
Dept: INTERNAL MEDICINE | Facility: CLINIC | Age: 66
End: 2021-01-01
Payer: MEDICARE

## 2021-01-01 ENCOUNTER — PATIENT OUTREACH (OUTPATIENT)
Dept: ADMINISTRATIVE | Facility: CLINIC | Age: 66
End: 2021-01-01
Payer: COMMERCIAL

## 2021-01-01 ENCOUNTER — EXTERNAL HOSPITAL ADMISSION (OUTPATIENT)
Dept: SKILLED NURSING FACILITY | Facility: HOSPITAL | Age: 66
End: 2021-01-01

## 2021-01-01 ENCOUNTER — ANESTHESIA (OUTPATIENT)
Dept: SURGERY | Facility: HOSPITAL | Age: 66
DRG: 474 | End: 2021-01-01
Payer: COMMERCIAL

## 2021-01-01 ENCOUNTER — PATIENT OUTREACH (OUTPATIENT)
Dept: PRIMARY CARE CLINIC | Facility: CLINIC | Age: 66
End: 2021-01-01

## 2021-01-01 ENCOUNTER — ANESTHESIA (OUTPATIENT)
Dept: SURGERY | Facility: HOSPITAL | Age: 66
DRG: 252 | End: 2021-01-01
Payer: MEDICARE

## 2021-01-01 ENCOUNTER — TELEPHONE (OUTPATIENT)
Dept: RESEARCH | Facility: HOSPITAL | Age: 66
End: 2021-01-01
Payer: COMMERCIAL

## 2021-01-01 ENCOUNTER — ANESTHESIA EVENT (OUTPATIENT)
Dept: SURGERY | Facility: HOSPITAL | Age: 66
DRG: 252 | End: 2021-01-01
Payer: MEDICARE

## 2021-01-01 ENCOUNTER — OFFICE VISIT (OUTPATIENT)
Dept: NEUROSURGERY | Facility: CLINIC | Age: 66
End: 2021-01-01
Payer: MEDICARE

## 2021-01-01 ENCOUNTER — PATIENT OUTREACH (OUTPATIENT)
Dept: ADMINISTRATIVE | Facility: CLINIC | Age: 66
End: 2021-01-01

## 2021-01-01 ENCOUNTER — PATIENT OUTREACH (OUTPATIENT)
Dept: ADMINISTRATIVE | Facility: OTHER | Age: 66
End: 2021-01-01
Payer: COMMERCIAL

## 2021-01-01 ENCOUNTER — PATIENT MESSAGE (OUTPATIENT)
Dept: VASCULAR SURGERY | Facility: CLINIC | Age: 66
End: 2021-01-01

## 2021-01-01 ENCOUNTER — HOSPITAL ENCOUNTER (OUTPATIENT)
Dept: RADIOLOGY | Facility: HOSPITAL | Age: 66
Discharge: HOME | End: 2021-12-14
Attending: INTERNAL MEDICINE
Payer: MEDICARE

## 2021-01-01 ENCOUNTER — ANESTHESIA EVENT (OUTPATIENT)
Dept: SURGERY | Facility: HOSPITAL | Age: 66
DRG: 616 | End: 2021-01-01
Payer: MEDICARE

## 2021-01-01 ENCOUNTER — HOSPITAL ENCOUNTER (OUTPATIENT)
Dept: RADIOLOGY | Facility: HOSPITAL | Age: 66
Discharge: HOME | End: 2021-12-01
Attending: INTERNAL MEDICINE
Payer: MEDICARE

## 2021-01-01 ENCOUNTER — HOSPITAL ENCOUNTER (INPATIENT)
Facility: OTHER | Age: 66
LOS: 2 days | Discharge: HOME | DRG: 189 | End: 2021-11-05
Attending: EMERGENCY MEDICINE | Admitting: INTERNAL MEDICINE
Payer: MEDICARE

## 2021-01-01 ENCOUNTER — PATIENT MESSAGE (OUTPATIENT)
Dept: INFECTIOUS DISEASES | Facility: CLINIC | Age: 66
End: 2021-01-01

## 2021-01-01 ENCOUNTER — TELEPHONE (OUTPATIENT)
Dept: RADIOLOGY | Facility: HOSPITAL | Age: 66
End: 2021-01-01
Payer: COMMERCIAL

## 2021-01-01 ENCOUNTER — HOSPITAL ENCOUNTER (INPATIENT)
Facility: HOSPITAL | Age: 66
LOS: 7 days | Discharge: HOME HEALTH | DRG: 252 | End: 2021-05-17
Attending: EMERGENCY MEDICINE | Admitting: INTERNAL MEDICINE
Payer: MEDICARE

## 2021-01-01 ENCOUNTER — HOSPITAL ENCOUNTER (INPATIENT)
Facility: HOSPITAL | Age: 66
LOS: 24 days | Discharge: NURSING, REHAB OR LONG-TERM CARE | DRG: 616 | End: 2021-06-16
Attending: EMERGENCY MEDICINE | Admitting: INTERNAL MEDICINE
Payer: MEDICARE

## 2021-01-01 ENCOUNTER — OFFICE VISIT (OUTPATIENT)
Dept: CARDIOLOGY | Facility: CLINIC | Age: 66
End: 2021-01-01
Payer: MEDICARE

## 2021-01-01 VITALS
DIASTOLIC BLOOD PRESSURE: 96 MMHG | HEIGHT: 64 IN | HEART RATE: 130 BPM | BODY MASS INDEX: 40.54 KG/M2 | SYSTOLIC BLOOD PRESSURE: 168 MMHG | WEIGHT: 237.44 LBS

## 2021-01-01 VITALS
DIASTOLIC BLOOD PRESSURE: 86 MMHG | HEART RATE: 112 BPM | WEIGHT: 237.44 LBS | BODY MASS INDEX: 40.76 KG/M2 | SYSTOLIC BLOOD PRESSURE: 169 MMHG

## 2021-01-01 VITALS
TEMPERATURE: 98 F | HEART RATE: 111 BPM | WEIGHT: 233.94 LBS | BODY MASS INDEX: 41.45 KG/M2 | RESPIRATION RATE: 17 BRPM | HEIGHT: 63 IN | OXYGEN SATURATION: 96 % | DIASTOLIC BLOOD PRESSURE: 78 MMHG | SYSTOLIC BLOOD PRESSURE: 149 MMHG

## 2021-01-01 VITALS
BODY MASS INDEX: 40.54 KG/M2 | SYSTOLIC BLOOD PRESSURE: 173 MMHG | DIASTOLIC BLOOD PRESSURE: 93 MMHG | WEIGHT: 237.44 LBS | HEIGHT: 64 IN | HEART RATE: 122 BPM

## 2021-01-01 VITALS
OXYGEN SATURATION: 99 % | DIASTOLIC BLOOD PRESSURE: 70 MMHG | HEART RATE: 91 BPM | HEIGHT: 60 IN | BODY MASS INDEX: 46.88 KG/M2 | SYSTOLIC BLOOD PRESSURE: 147 MMHG | WEIGHT: 238.75 LBS | RESPIRATION RATE: 18 BRPM | TEMPERATURE: 99 F

## 2021-01-01 VITALS
WEIGHT: 238.13 LBS | RESPIRATION RATE: 18 BRPM | HEIGHT: 63 IN | TEMPERATURE: 99 F | BODY MASS INDEX: 42.19 KG/M2 | DIASTOLIC BLOOD PRESSURE: 61 MMHG | SYSTOLIC BLOOD PRESSURE: 115 MMHG | HEART RATE: 100 BPM

## 2021-01-01 VITALS
DIASTOLIC BLOOD PRESSURE: 60 MMHG | TEMPERATURE: 98 F | WEIGHT: 250 LBS | BODY MASS INDEX: 44.3 KG/M2 | SYSTOLIC BLOOD PRESSURE: 109 MMHG | HEART RATE: 89 BPM | RESPIRATION RATE: 18 BRPM | HEIGHT: 63 IN

## 2021-01-01 VITALS
DIASTOLIC BLOOD PRESSURE: 79 MMHG | SYSTOLIC BLOOD PRESSURE: 138 MMHG | HEART RATE: 112 BPM | RESPIRATION RATE: 13 BRPM | TEMPERATURE: 98 F

## 2021-01-01 VITALS
WEIGHT: 234.38 LBS | TEMPERATURE: 98 F | RESPIRATION RATE: 15 BRPM | HEART RATE: 51 BPM | OXYGEN SATURATION: 94 % | DIASTOLIC BLOOD PRESSURE: 58 MMHG | HEIGHT: 63 IN | BODY MASS INDEX: 41.53 KG/M2 | SYSTOLIC BLOOD PRESSURE: 124 MMHG

## 2021-01-01 VITALS
OXYGEN SATURATION: 92 % | WEIGHT: 240.75 LBS | TEMPERATURE: 99 F | SYSTOLIC BLOOD PRESSURE: 141 MMHG | HEART RATE: 100 BPM | RESPIRATION RATE: 18 BRPM | HEIGHT: 64 IN | BODY MASS INDEX: 41.1 KG/M2 | DIASTOLIC BLOOD PRESSURE: 63 MMHG

## 2021-01-01 VITALS
OXYGEN SATURATION: 100 % | HEIGHT: 60 IN | DIASTOLIC BLOOD PRESSURE: 62 MMHG | TEMPERATURE: 98 F | HEART RATE: 63 BPM | BODY MASS INDEX: 42.93 KG/M2 | SYSTOLIC BLOOD PRESSURE: 124 MMHG

## 2021-01-01 VITALS — HEIGHT: 60 IN | WEIGHT: 219 LBS | BODY MASS INDEX: 43 KG/M2

## 2021-01-01 VITALS
WEIGHT: 246.69 LBS | BODY MASS INDEX: 43.71 KG/M2 | DIASTOLIC BLOOD PRESSURE: 68 MMHG | SYSTOLIC BLOOD PRESSURE: 157 MMHG | HEART RATE: 105 BPM | RESPIRATION RATE: 16 BRPM | HEIGHT: 63 IN | TEMPERATURE: 99 F | OXYGEN SATURATION: 97 %

## 2021-01-01 VITALS
BODY MASS INDEX: 40.54 KG/M2 | RESPIRATION RATE: 20 BRPM | WEIGHT: 237.44 LBS | HEIGHT: 64 IN | DIASTOLIC BLOOD PRESSURE: 88 MMHG | HEART RATE: 112 BPM | OXYGEN SATURATION: 90 % | SYSTOLIC BLOOD PRESSURE: 170 MMHG | TEMPERATURE: 98 F

## 2021-01-01 VITALS
BODY MASS INDEX: 40.76 KG/M2 | HEART RATE: 99 BPM | SYSTOLIC BLOOD PRESSURE: 160 MMHG | DIASTOLIC BLOOD PRESSURE: 98 MMHG | WEIGHT: 237.44 LBS

## 2021-01-01 VITALS
SYSTOLIC BLOOD PRESSURE: 195 MMHG | BODY MASS INDEX: 42.77 KG/M2 | HEART RATE: 68 BPM | DIASTOLIC BLOOD PRESSURE: 98 MMHG | HEIGHT: 60 IN

## 2021-01-01 DIAGNOSIS — L08.9 FOOT INFECTION: Primary | ICD-10-CM

## 2021-01-01 DIAGNOSIS — Z99.2 TYPE 2 DIABETES MELLITUS WITH CHRONIC KIDNEY DISEASE ON CHRONIC DIALYSIS, WITH LONG-TERM CURRENT USE OF INSULIN: ICD-10-CM

## 2021-01-01 DIAGNOSIS — I10 ESSENTIAL HYPERTENSION: ICD-10-CM

## 2021-01-01 DIAGNOSIS — M20.60: ICD-10-CM

## 2021-01-01 DIAGNOSIS — Z74.09 IMPAIRED MOBILITY AND ACTIVITIES OF DAILY LIVING: ICD-10-CM

## 2021-01-01 DIAGNOSIS — Z79.4 TYPE 2 DIABETES MELLITUS WITH CHRONIC KIDNEY DISEASE ON CHRONIC DIALYSIS, WITH LONG-TERM CURRENT USE OF INSULIN: ICD-10-CM

## 2021-01-01 DIAGNOSIS — Z99.2 TYPE 2 DIABETES MELLITUS WITH CHRONIC KIDNEY DISEASE ON CHRONIC DIALYSIS, WITH LONG-TERM CURRENT USE OF INSULIN: Primary | ICD-10-CM

## 2021-01-01 DIAGNOSIS — R00.0 TACHYCARDIA: ICD-10-CM

## 2021-01-01 DIAGNOSIS — I10 HYPERTENSION, UNSPECIFIED TYPE: Primary | ICD-10-CM

## 2021-01-01 DIAGNOSIS — I96 GANGRENE OF TOE OF LEFT FOOT: ICD-10-CM

## 2021-01-01 DIAGNOSIS — D63.1 ANEMIA DUE TO PRE-ESRD TREATED WITH ERYTHROPOIETIN: ICD-10-CM

## 2021-01-01 DIAGNOSIS — E11.22 TYPE 2 DIABETES MELLITUS WITH CHRONIC KIDNEY DISEASE ON CHRONIC DIALYSIS, WITH LONG-TERM CURRENT USE OF INSULIN: Primary | ICD-10-CM

## 2021-01-01 DIAGNOSIS — R53.81 DEBILITY: Primary | ICD-10-CM

## 2021-01-01 DIAGNOSIS — I48.91 ATRIAL FIBRILLATION, UNSPECIFIED TYPE: ICD-10-CM

## 2021-01-01 DIAGNOSIS — I99.8 ISCHEMIC FOOT: ICD-10-CM

## 2021-01-01 DIAGNOSIS — L97.513 RIGHT FOOT ULCER, WITH NECROSIS OF MUSCLE: ICD-10-CM

## 2021-01-01 DIAGNOSIS — E11.22 TYPE 2 DIABETES MELLITUS WITH CHRONIC KIDNEY DISEASE ON CHRONIC DIALYSIS, WITH LONG-TERM CURRENT USE OF INSULIN: ICD-10-CM

## 2021-01-01 DIAGNOSIS — E11.42 DIABETIC PERIPHERAL NEUROPATHY ASSOCIATED WITH TYPE 2 DIABETES MELLITUS: ICD-10-CM

## 2021-01-01 DIAGNOSIS — M79.644 PAIN OF FINGER OF RIGHT HAND: ICD-10-CM

## 2021-01-01 DIAGNOSIS — E78.49 OTHER HYPERLIPIDEMIA: ICD-10-CM

## 2021-01-01 DIAGNOSIS — Z89.429 STATUS POST AMPUTATION OF LESSER TOE, UNSPECIFIED LATERALITY: ICD-10-CM

## 2021-01-01 DIAGNOSIS — L97.523 ULCER OF LEFT FOOT WITH NECROSIS OF MUSCLE: Primary | ICD-10-CM

## 2021-01-01 DIAGNOSIS — Z99.2 END-STAGE RENAL DISEASE ON HEMODIALYSIS: Chronic | ICD-10-CM

## 2021-01-01 DIAGNOSIS — I73.9 PERIPHERAL ARTERY DISEASE: Primary | ICD-10-CM

## 2021-01-01 DIAGNOSIS — D63.1 ANEMIA IN ESRD (END-STAGE RENAL DISEASE): ICD-10-CM

## 2021-01-01 DIAGNOSIS — I48.0 PAROXYSMAL ATRIAL FIBRILLATION: ICD-10-CM

## 2021-01-01 DIAGNOSIS — T81.30XA WOUND DEHISCENCE: ICD-10-CM

## 2021-01-01 DIAGNOSIS — R07.9 CHEST PAIN: ICD-10-CM

## 2021-01-01 DIAGNOSIS — R06.02 SHORTNESS OF BREATH: ICD-10-CM

## 2021-01-01 DIAGNOSIS — M86.672 OTHER CHRONIC OSTEOMYELITIS OF LEFT FOOT: Primary | ICD-10-CM

## 2021-01-01 DIAGNOSIS — N25.81 SECONDARY HYPERPARATHYROIDISM: ICD-10-CM

## 2021-01-01 DIAGNOSIS — N03.9 ANEMIA DUE TO PRE-ESRD TREATED WITH ERYTHROPOIETIN: ICD-10-CM

## 2021-01-01 DIAGNOSIS — I50.33 ACUTE ON CHRONIC DIASTOLIC HEART FAILURE: ICD-10-CM

## 2021-01-01 DIAGNOSIS — Z09 POSTOPERATIVE FOLLOW-UP: ICD-10-CM

## 2021-01-01 DIAGNOSIS — N18.6 ESRD (END STAGE RENAL DISEASE) ON DIALYSIS: ICD-10-CM

## 2021-01-01 DIAGNOSIS — J96.11 CHRONIC RESPIRATORY FAILURE WITH HYPOXIA: ICD-10-CM

## 2021-01-01 DIAGNOSIS — Z99.2 ESRD (END STAGE RENAL DISEASE) ON DIALYSIS: ICD-10-CM

## 2021-01-01 DIAGNOSIS — R60.0 LEG EDEMA, LEFT: ICD-10-CM

## 2021-01-01 DIAGNOSIS — L08.9 DIABETIC FOOT INFECTION: ICD-10-CM

## 2021-01-01 DIAGNOSIS — L03.90 CELLULITIS, UNSPECIFIED CELLULITIS SITE: Primary | ICD-10-CM

## 2021-01-01 DIAGNOSIS — Z85.3 HISTORY OF LEFT BREAST CANCER: ICD-10-CM

## 2021-01-01 DIAGNOSIS — R53.83 FATIGUE: ICD-10-CM

## 2021-01-01 DIAGNOSIS — M89.9 CHRONIC KIDNEY DISEASE-MINERAL AND BONE DISORDER: ICD-10-CM

## 2021-01-01 DIAGNOSIS — N18.6 TYPE 2 DIABETES MELLITUS WITH CHRONIC KIDNEY DISEASE ON CHRONIC DIALYSIS, WITH LONG-TERM CURRENT USE OF INSULIN: Primary | ICD-10-CM

## 2021-01-01 DIAGNOSIS — J96.21 ACUTE ON CHRONIC RESPIRATORY FAILURE WITH HYPOXIA: ICD-10-CM

## 2021-01-01 DIAGNOSIS — R53.83 FATIGUE, UNSPECIFIED TYPE: ICD-10-CM

## 2021-01-01 DIAGNOSIS — Z85.038 HISTORY OF COLON CANCER: ICD-10-CM

## 2021-01-01 DIAGNOSIS — J44.9 COPD WITH HYPOXIA: ICD-10-CM

## 2021-01-01 DIAGNOSIS — L08.9 DIABETIC FOOT INFECTION: Primary | ICD-10-CM

## 2021-01-01 DIAGNOSIS — I73.9 PERIPHERAL ARTERY DISEASE: ICD-10-CM

## 2021-01-01 DIAGNOSIS — Z20.822 ENCOUNTER FOR LABORATORY TESTING FOR COVID-19 VIRUS: ICD-10-CM

## 2021-01-01 DIAGNOSIS — Z78.9 IMPAIRED MOBILITY AND ACTIVITIES OF DAILY LIVING: ICD-10-CM

## 2021-01-01 DIAGNOSIS — D63.1 ANEMIA IN ESRD (END-STAGE RENAL DISEASE): Primary | ICD-10-CM

## 2021-01-01 DIAGNOSIS — L97.524 ULCER OF LEFT FOOT, WITH NECROSIS OF BONE: Primary | ICD-10-CM

## 2021-01-01 DIAGNOSIS — Z86.79 HISTORY OF INTRACRANIAL HEMORRHAGE: Chronic | ICD-10-CM

## 2021-01-01 DIAGNOSIS — I48.91 ATRIAL FIBRILLATION WITH RAPID VENTRICULAR RESPONSE: ICD-10-CM

## 2021-01-01 DIAGNOSIS — M86.272 SUBACUTE OSTEOMYELITIS OF LEFT FOOT: Primary | ICD-10-CM

## 2021-01-01 DIAGNOSIS — N18.6 TYPE 2 DIABETES MELLITUS WITH CHRONIC KIDNEY DISEASE ON CHRONIC DIALYSIS, WITH LONG-TERM CURRENT USE OF INSULIN: Chronic | ICD-10-CM

## 2021-01-01 DIAGNOSIS — Z79.4 TYPE 2 DIABETES MELLITUS WITH CHRONIC KIDNEY DISEASE ON CHRONIC DIALYSIS, WITH LONG-TERM CURRENT USE OF INSULIN: Primary | ICD-10-CM

## 2021-01-01 DIAGNOSIS — M25.551 RIGHT HIP PAIN: Primary | ICD-10-CM

## 2021-01-01 DIAGNOSIS — I48.0 PAF (PAROXYSMAL ATRIAL FIBRILLATION): ICD-10-CM

## 2021-01-01 DIAGNOSIS — N18.6 ANEMIA IN ESRD (END-STAGE RENAL DISEASE): ICD-10-CM

## 2021-01-01 DIAGNOSIS — I73.9 PAD (PERIPHERAL ARTERY DISEASE): ICD-10-CM

## 2021-01-01 DIAGNOSIS — E11.628 DIABETIC FOOT INFECTION: Primary | ICD-10-CM

## 2021-01-01 DIAGNOSIS — N18.6 TYPE 2 DIABETES MELLITUS WITH CHRONIC KIDNEY DISEASE ON CHRONIC DIALYSIS, WITH LONG-TERM CURRENT USE OF INSULIN: ICD-10-CM

## 2021-01-01 DIAGNOSIS — I96 GANGRENE: ICD-10-CM

## 2021-01-01 DIAGNOSIS — E83.9 CHRONIC KIDNEY DISEASE-MINERAL AND BONE DISORDER: ICD-10-CM

## 2021-01-01 DIAGNOSIS — R29.6 MULTIPLE FALLS: ICD-10-CM

## 2021-01-01 DIAGNOSIS — I96 GANGRENE OF TOE OF LEFT FOOT: Primary | ICD-10-CM

## 2021-01-01 DIAGNOSIS — I70.269 ATHEROSCLEROTIC PERIPHERAL VASCULAR DISEASE WITH GANGRENE: Primary | ICD-10-CM

## 2021-01-01 DIAGNOSIS — N18.6 END-STAGE RENAL DISEASE ON HEMODIALYSIS: Chronic | ICD-10-CM

## 2021-01-01 DIAGNOSIS — M48.061 SPINAL STENOSIS OF LUMBAR REGION, UNSPECIFIED WHETHER NEUROGENIC CLAUDICATION PRESENT: ICD-10-CM

## 2021-01-01 DIAGNOSIS — L03.116 CELLULITIS OF LEG, LEFT: ICD-10-CM

## 2021-01-01 DIAGNOSIS — E66.01 MORBID OBESITY: Chronic | ICD-10-CM

## 2021-01-01 DIAGNOSIS — E66.01 SEVERE OBESITY (BMI >= 40): ICD-10-CM

## 2021-01-01 DIAGNOSIS — R09.02 HYPOXIA: Primary | ICD-10-CM

## 2021-01-01 DIAGNOSIS — E11.22 TYPE 2 DIABETES MELLITUS WITH CHRONIC KIDNEY DISEASE ON CHRONIC DIALYSIS, WITH LONG-TERM CURRENT USE OF INSULIN: Chronic | ICD-10-CM

## 2021-01-01 DIAGNOSIS — M54.50 LOW BACK PAIN, UNSPECIFIED BACK PAIN LATERALITY, UNSPECIFIED CHRONICITY, UNSPECIFIED WHETHER SCIATICA PRESENT: ICD-10-CM

## 2021-01-01 DIAGNOSIS — Z85.3 HISTORY OF BREAST CANCER: ICD-10-CM

## 2021-01-01 DIAGNOSIS — R06.00 DYSPNEA: ICD-10-CM

## 2021-01-01 DIAGNOSIS — N18.6 ANEMIA IN ESRD (END-STAGE RENAL DISEASE): Primary | ICD-10-CM

## 2021-01-01 DIAGNOSIS — N18.6 END-STAGE RENAL DISEASE ON HEMODIALYSIS: ICD-10-CM

## 2021-01-01 DIAGNOSIS — E78.00 HYPERCHOLESTEROLEMIA: ICD-10-CM

## 2021-01-01 DIAGNOSIS — Z99.81 ON HOME OXYGEN THERAPY: ICD-10-CM

## 2021-01-01 DIAGNOSIS — M86.272 SUBACUTE OSTEOMYELITIS OF LEFT FOOT: ICD-10-CM

## 2021-01-01 DIAGNOSIS — T81.30XA WOUND DEHISCENCE: Primary | ICD-10-CM

## 2021-01-01 DIAGNOSIS — J43.8 OTHER EMPHYSEMA: Chronic | ICD-10-CM

## 2021-01-01 DIAGNOSIS — I48.91 ATRIAL FIBRILLATION: ICD-10-CM

## 2021-01-01 DIAGNOSIS — M17.0 BILATERAL PRIMARY OSTEOARTHRITIS OF KNEE: ICD-10-CM

## 2021-01-01 DIAGNOSIS — R52 PAIN: ICD-10-CM

## 2021-01-01 DIAGNOSIS — Z99.2 ESRD ON DIALYSIS: ICD-10-CM

## 2021-01-01 DIAGNOSIS — Z86.73 HISTORY OF CEREBROVASCULAR ACCIDENT: Chronic | ICD-10-CM

## 2021-01-01 DIAGNOSIS — U07.1 COVID-19: Primary | ICD-10-CM

## 2021-01-01 DIAGNOSIS — N18.6 ESRD ON DIALYSIS: ICD-10-CM

## 2021-01-01 DIAGNOSIS — R53.81 DEBILITY: ICD-10-CM

## 2021-01-01 DIAGNOSIS — E83.39 HYPERPHOSPHATEMIA: ICD-10-CM

## 2021-01-01 DIAGNOSIS — R55 SYNCOPE AND COLLAPSE: Primary | ICD-10-CM

## 2021-01-01 DIAGNOSIS — I65.23 BILATERAL CAROTID ARTERY STENOSIS: ICD-10-CM
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
## 2021-01-01 DIAGNOSIS — U07.1 COVID-19 VIRUS DETECTED: ICD-10-CM

## 2021-01-01 DIAGNOSIS — Z09 POSTOPERATIVE FOLLOW-UP: Primary | ICD-10-CM

## 2021-01-01 DIAGNOSIS — Z99.2 END-STAGE RENAL DISEASE ON HEMODIALYSIS: ICD-10-CM

## 2021-01-01 DIAGNOSIS — I73.9 PVD (PERIPHERAL VASCULAR DISEASE): Primary | ICD-10-CM

## 2021-01-01 DIAGNOSIS — J96.20 ACUTE ON CHRONIC RESPIRATORY FAILURE: ICD-10-CM

## 2021-01-01 DIAGNOSIS — I10 PRIMARY HYPERTENSION: ICD-10-CM

## 2021-01-01 DIAGNOSIS — M86.672 OTHER CHRONIC OSTEOMYELITIS OF LEFT FOOT: ICD-10-CM

## 2021-01-01 DIAGNOSIS — Z89.432 HISTORY OF TRANSMETATARSAL AMPUTATION OF LEFT FOOT: Primary | ICD-10-CM

## 2021-01-01 DIAGNOSIS — L97.911 ULCER OF RIGHT LEG, LIMITED TO BREAKDOWN OF SKIN: ICD-10-CM

## 2021-01-01 DIAGNOSIS — R50.9 FEVER, UNSPECIFIED FEVER CAUSE: ICD-10-CM

## 2021-01-01 DIAGNOSIS — J18.9 PNEUMONIA OF LEFT LUNG DUE TO INFECTIOUS ORGANISM, UNSPECIFIED PART OF LUNG: Primary | ICD-10-CM

## 2021-01-01 DIAGNOSIS — I50.32 CHRONIC DIASTOLIC CHF (CONGESTIVE HEART FAILURE): ICD-10-CM

## 2021-01-01 DIAGNOSIS — M54.50 LOW BACK PAIN, UNSPECIFIED BACK PAIN LATERALITY, UNSPECIFIED CHRONICITY, UNSPECIFIED WHETHER SCIATICA PRESENT: Primary | ICD-10-CM

## 2021-01-01 DIAGNOSIS — A41.9 SEPSIS, DUE TO UNSPECIFIED ORGANISM, UNSPECIFIED WHETHER ACUTE ORGAN DYSFUNCTION PRESENT: ICD-10-CM

## 2021-01-01 DIAGNOSIS — Z79.4 TYPE 2 DIABETES MELLITUS WITH CHRONIC KIDNEY DISEASE ON CHRONIC DIALYSIS, WITH LONG-TERM CURRENT USE OF INSULIN: Chronic | ICD-10-CM

## 2021-01-01 DIAGNOSIS — Z99.2 TYPE 2 DIABETES MELLITUS WITH CHRONIC KIDNEY DISEASE ON CHRONIC DIALYSIS, WITH LONG-TERM CURRENT USE OF INSULIN: Chronic | ICD-10-CM

## 2021-01-01 DIAGNOSIS — I87.2 CHRONIC VENOUS INSUFFICIENCY: ICD-10-CM

## 2021-01-01 DIAGNOSIS — E11.628 DIABETIC FOOT INFECTION: ICD-10-CM

## 2021-01-01 DIAGNOSIS — J96.01 ACUTE HYPOXEMIC RESPIRATORY FAILURE: ICD-10-CM

## 2021-01-01 DIAGNOSIS — N18.9 CHRONIC KIDNEY DISEASE-MINERAL AND BONE DISORDER: ICD-10-CM

## 2021-01-01 DIAGNOSIS — R53.81 PHYSICAL DECONDITIONING: Primary | ICD-10-CM

## 2021-01-01 LAB
ABO + RH BLD: NORMAL
ACID FAST MOD KINY STN SPEC: NORMAL
ALBUMIN SERPL BCP-MCNC: 2.2 G/DL (ref 3.5–5.2)
ALBUMIN SERPL BCP-MCNC: 2.3 G/DL (ref 3.5–5.2)
ALBUMIN SERPL BCP-MCNC: 2.4 G/DL (ref 3.5–5.2)
ALBUMIN SERPL BCP-MCNC: 2.5 G/DL (ref 3.5–5.2)
ALBUMIN SERPL BCP-MCNC: 2.6 G/DL (ref 3.5–5.2)
ALBUMIN SERPL BCP-MCNC: 2.7 G/DL (ref 3.5–5.2)
ALBUMIN SERPL BCP-MCNC: 2.8 G/DL (ref 3.5–5.2)
ALBUMIN SERPL BCP-MCNC: 2.9 G/DL (ref 3.5–5.2)
ALBUMIN SERPL BCP-MCNC: 3 G/DL (ref 3.5–5.2)
ALBUMIN SERPL BCP-MCNC: 3.2 G/DL (ref 3.5–5.2)
ALBUMIN SERPL BCP-MCNC: 3.3 G/DL (ref 3.5–5.2)
ALLENS TEST: ABNORMAL
ALP ISOS SERPL LEV INH-CCNC: 67 U/L (ref 25–165)
ALP SERPL-CCNC: 54 U/L (ref 55–135)
ALP SERPL-CCNC: 56 U/L (ref 55–135)
ALP SERPL-CCNC: 57 U/L (ref 55–135)
ALP SERPL-CCNC: 57 U/L (ref 55–135)
ALP SERPL-CCNC: 58 U/L (ref 55–135)
ALP SERPL-CCNC: 60 U/L (ref 55–135)
ALP SERPL-CCNC: 60 U/L (ref 55–135)
ALP SERPL-CCNC: 61 U/L (ref 55–135)
ALP SERPL-CCNC: 62 U/L (ref 55–135)
ALP SERPL-CCNC: 62 U/L (ref 55–135)
ALP SERPL-CCNC: 63 U/L (ref 55–135)
ALP SERPL-CCNC: 65 U/L (ref 55–135)
ALP SERPL-CCNC: 66 U/L (ref 55–135)
ALP SERPL-CCNC: 66 U/L (ref 55–135)
ALP SERPL-CCNC: 67 U/L (ref 55–135)
ALP SERPL-CCNC: 68 U/L (ref 55–135)
ALP SERPL-CCNC: 71 U/L (ref 55–135)
ALP SERPL-CCNC: 72 U/L (ref 55–135)
ALP SERPL-CCNC: 73 U/L (ref 55–135)
ALP SERPL-CCNC: 79 U/L (ref 55–135)
ALP SERPL-CCNC: 81 U/L (ref 55–135)
ALP SERPL-CCNC: 83 U/L (ref 55–135)
ALP SERPL-CCNC: 87 U/L (ref 55–135)
ALP SERPL-CCNC: 89 U/L (ref 55–135)
ALP SERPL-CCNC: 91 U/L (ref 55–135)
ALT SERPL W/O P-5'-P-CCNC: 11 U/L (ref 10–44)
ALT SERPL W/O P-5'-P-CCNC: 12 U/L (ref 10–44)
ALT SERPL W/O P-5'-P-CCNC: 13 U/L (ref 10–44)
ALT SERPL W/O P-5'-P-CCNC: 14 U/L (ref 10–44)
ALT SERPL W/O P-5'-P-CCNC: 15 U/L (ref 10–44)
ALT SERPL W/O P-5'-P-CCNC: 15 U/L (ref 10–44)
ALT SERPL W/O P-5'-P-CCNC: 16 U/L (ref 10–44)
ALT SERPL W/O P-5'-P-CCNC: 17 U/L (ref 10–44)
ALT SERPL W/O P-5'-P-CCNC: 18 U/L (ref 10–44)
ALT SERPL W/O P-5'-P-CCNC: 24 U/L (ref 10–44)
ALT SERPL W/O P-5'-P-CCNC: 25 U/L (ref 10–44)
ALT SERPL W/O P-5'-P-CCNC: 27 U/L (ref 10–44)
ALT SERPL W/O P-5'-P-CCNC: 28 U/L (ref 10–44)
ALT SERPL W/O P-5'-P-CCNC: 30 U/L (ref 10–44)
ALT SERPL W/O P-5'-P-CCNC: 30 U/L (ref 10–44)
ALT SERPL W/O P-5'-P-CCNC: 32 U/L (ref 10–44)
ALT SERPL W/O P-5'-P-CCNC: 32 U/L (ref 10–44)
ALT SERPL-CCNC: 20 U/L (ref 0–40)
ANION GAP SERPL CALC-SCNC: 10 MMOL/L (ref 8–16)
ANION GAP SERPL CALC-SCNC: 11 MMOL/L (ref 8–16)
ANION GAP SERPL CALC-SCNC: 12 MMOL/L (ref 8–16)
ANION GAP SERPL CALC-SCNC: 13 MMOL/L (ref 8–16)
ANION GAP SERPL CALC-SCNC: 14 MMOL/L (ref 8–16)
ANION GAP SERPL CALC-SCNC: 15 MMOL/L (ref 8–16)
ANION GAP SERPL CALC-SCNC: 16 MMOL/L (ref 8–16)
ANION GAP SERPL CALC-SCNC: 17 MMOL/L (ref 8–16)
ANION GAP SERPL CALC-SCNC: 18 MMOL/L (ref 8–16)
ANION GAP SERPL CALC-SCNC: 18 MMOL/L (ref 8–16)
ANION GAP SERPL CALC-SCNC: 19 MMOL/L (ref 8–16)
ANION GAP SERPL CALC-SCNC: 20 MMOL/L (ref 8–16)
ANION GAP SERPL CALC-SCNC: 20 MMOL/L (ref 8–16)
ANION GAP SERPL CALC-SCNC: 21 MMOL/L (ref 8–16)
ANION GAP SERPL CALC-SCNC: 22 MMOL/L (ref 8–16)
ANISOCYTOSIS BLD QL SMEAR: SLIGHT
ASCENDING AORTA: 3.22 CM
AST SERPL-CCNC: 10 U/L (ref 10–40)
AST SERPL-CCNC: 11 U/L (ref 10–40)
AST SERPL-CCNC: 12 U/L (ref 10–40)
AST SERPL-CCNC: 12 U/L (ref 10–40)
AST SERPL-CCNC: 13 U/L (ref 10–40)
AST SERPL-CCNC: 13 U/L (ref 10–40)
AST SERPL-CCNC: 14 U/L (ref 10–40)
AST SERPL-CCNC: 15 U/L (ref 10–40)
AST SERPL-CCNC: 16 U/L (ref 10–40)
AST SERPL-CCNC: 16 U/L (ref 10–40)
AST SERPL-CCNC: 17 U/L (ref 10–40)
AST SERPL-CCNC: 18 U/L (ref 10–40)
AST SERPL-CCNC: 21 U/L (ref 10–40)
AST SERPL-CCNC: 22 U/L (ref 10–40)
AST SERPL-CCNC: 22 U/L (ref 10–40)
AST SERPL-CCNC: 24 U/L (ref 10–40)
AST SERPL-CCNC: 26 U/L (ref 10–40)
AST SERPL-CCNC: 26 U/L (ref 10–40)
AST SERPL-CCNC: 27 U/L (ref 10–40)
AST SERPL-CCNC: 38 U/L (ref 10–40)
AST SERPL-CCNC: 8 U/L (ref 10–40)
AST SERPL-CCNC: 9 U/L (ref 10–40)
AST: 13 U/L (ref 0–40)
AV INDEX (PROSTH): 0.91
AV INDEX (PROSTH): 1.07
AV MEAN GRADIENT: 2 MMHG
AV MEAN GRADIENT: 7 MMHG
AV PEAK GRADIENT: 11 MMHG
AV PEAK GRADIENT: 3 MMHG
AV VALVE AREA: 2.98 CM2
AV VALVE AREA: 3.44 CM2
AV VELOCITY RATIO: 0.72
AV VELOCITY RATIO: 1.07
BACTERIA BLD CULT: NORMAL
BACTERIA SPEC AEROBE CULT: ABNORMAL
BACTERIA SPEC AEROBE CULT: NORMAL
BACTERIA SPEC ANAEROBE CULT: NORMAL
BASOPHILS # BLD AUTO: 0.02 K/UL (ref 0–0.2)
BASOPHILS # BLD AUTO: 0.03 K/UL (ref 0–0.2)
BASOPHILS # BLD AUTO: 0.04 K/UL (ref 0–0.2)
BASOPHILS # BLD AUTO: 0.05 K/UL (ref 0–0.2)
BASOPHILS # BLD AUTO: 0.06 K/UL (ref 0–0.2)
BASOPHILS NFR BLD: 0.3 % (ref 0–1.9)
BASOPHILS NFR BLD: 0.4 % (ref 0–1.9)
BASOPHILS NFR BLD: 0.5 % (ref 0–1.9)
BASOPHILS NFR BLD: 0.6 % (ref 0–1.9)
BASOPHILS NFR BLD: 0.7 % (ref 0–1.9)
BASOPHILS NFR BLD: 0.8 % (ref 0–1.9)
BASOPHILS NFR BLD: 0.9 % (ref 0–1.9)
BASOPHILS NFR BLD: 1.3 % (ref 0–1.9)
BILIRUB DIRECT SERPL-MCNC: 0.2 MG/DL (ref 0.1–0.3)
BILIRUB SERPL-MCNC: 0.3 MG/DL (ref 0.1–1)
BILIRUB SERPL-MCNC: 0.3 MG/DL (ref 0.1–1)
BILIRUB SERPL-MCNC: 0.4 MG/DL (ref 0.1–1)
BILIRUB SERPL-MCNC: 0.5 MG/DL (ref 0.1–1)
BILIRUB SERPL-MCNC: 0.6 MG/DL (ref 0.1–1)
BILIRUB SERPL-MCNC: 0.7 MG/DL (ref 0.1–1)
BILIRUB SERPL-MCNC: 1.1 MG/DL (ref 0.1–1)
BILIRUBIN TOTAL: 0.6 MG/DL (ref 0.1–1.2)
BLD GP AB SCN CELLS X3 SERPL QL: NORMAL
BLD PROD TYP BPU: NORMAL
BLOOD UNIT EXPIRATION DATE: NORMAL
BLOOD UNIT TYPE CODE: 6200
BLOOD UNIT TYPE: NORMAL
BNP SERPL-MCNC: 669 PG/ML (ref 0–99)
BNP SERPL-MCNC: 676 PG/ML (ref 0–99)
BNP SERPL-MCNC: 726 PG/ML (ref 0–99)
BNP SERPL-MCNC: 774 PG/ML (ref 0–99)
BSA FOR ECHO PROCEDURE: 2.17 M2
BSA FOR ECHO PROCEDURE: 2.32 M2
BUN BLD-MCNC: 30 MG/DL (ref 5–26)
BUN SERPL-MCNC: 15 MG/DL (ref 8–23)
BUN SERPL-MCNC: 19 MG/DL (ref 8–23)
BUN SERPL-MCNC: 21 MG/DL (ref 8–23)
BUN SERPL-MCNC: 21 MG/DL (ref 8–23)
BUN SERPL-MCNC: 22 MG/DL (ref 8–23)
BUN SERPL-MCNC: 24 MG/DL (ref 8–23)
BUN SERPL-MCNC: 25 MG/DL (ref 8–23)
BUN SERPL-MCNC: 26 MG/DL (ref 8–23)
BUN SERPL-MCNC: 26 MG/DL (ref 8–23)
BUN SERPL-MCNC: 27 MG/DL (ref 8–23)
BUN SERPL-MCNC: 28 MG/DL (ref 8–23)
BUN SERPL-MCNC: 29 MG/DL (ref 8–23)
BUN SERPL-MCNC: 29 MG/DL (ref 8–23)
BUN SERPL-MCNC: 30 MG/DL (ref 8–23)
BUN SERPL-MCNC: 31 MG/DL (ref 8–23)
BUN SERPL-MCNC: 32 MG/DL (ref 8–23)
BUN SERPL-MCNC: 33 MG/DL (ref 8–23)
BUN SERPL-MCNC: 34 MG/DL (ref 8–23)
BUN SERPL-MCNC: 36 MG/DL (ref 6–30)
BUN SERPL-MCNC: 36 MG/DL (ref 8–23)
BUN SERPL-MCNC: 36 MG/DL (ref 8–23)
BUN SERPL-MCNC: 37 MG/DL (ref 8–23)
BUN SERPL-MCNC: 37 MG/DL (ref 8–23)
BUN SERPL-MCNC: 38 MG/DL (ref 8–23)
BUN SERPL-MCNC: 39 MG/DL (ref 8–23)
BUN SERPL-MCNC: 39 MG/DL (ref 8–23)
BUN SERPL-MCNC: 41 MG/DL (ref 8–23)
BUN SERPL-MCNC: 42 MG/DL (ref 8–23)
BUN SERPL-MCNC: 43 MG/DL (ref 8–23)
BUN SERPL-MCNC: 43 MG/DL (ref 8–23)
BUN SERPL-MCNC: 45 MG/DL (ref 8–23)
BUN SERPL-MCNC: 46 MG/DL (ref 8–23)
BUN SERPL-MCNC: 47 MG/DL (ref 8–23)
BUN SERPL-MCNC: 47 MG/DL (ref 8–23)
BUN SERPL-MCNC: 49 MG/DL (ref 8–23)
BUN SERPL-MCNC: 49 MG/DL (ref 8–23)
BUN SERPL-MCNC: 50 MG/DL (ref 8–23)
BUN SERPL-MCNC: 51 MG/DL (ref 8–23)
BUN SERPL-MCNC: 53 MG/DL (ref 8–23)
BUN SERPL-MCNC: 54 MG/DL (ref 8–23)
BUN SERPL-MCNC: 55 MG/DL (ref 6–30)
BUN SERPL-MCNC: 56 MG/DL (ref 6–30)
BUN SERPL-MCNC: 56 MG/DL (ref 8–23)
BUN SERPL-MCNC: 57 MG/DL (ref 8–23)
BUN SERPL-MCNC: 57 MG/DL (ref 8–23)
BUN SERPL-MCNC: 59 MG/DL (ref 8–23)
BUN SERPL-MCNC: 62 MG/DL (ref 8–23)
BUN SERPL-MCNC: 65 MG/DL (ref 8–23)
BUN SERPL-MCNC: 66 MG/DL (ref 8–23)
BUN SERPL-MCNC: 67 MG/DL (ref 8–23)
BUN SERPL-MCNC: 68 MG/DL (ref 8–23)
BUN SERPL-MCNC: 70 MG/DL (ref 8–23)
BUN SERPL-MCNC: 70 MG/DL (ref 8–23)
BUN SERPL-MCNC: 75 MG/DL (ref 8–23)
BUN SERPL-MCNC: 84 MG/DL (ref 8–23)
CALCIUM SERPL-MCNC: 10 MG/DL (ref 8.7–10.5)
CALCIUM SERPL-MCNC: 10.2 MG/DL (ref 8.7–10.5)
CALCIUM SERPL-MCNC: 10.5 MG/DL (ref 8.7–10.5)
CALCIUM SERPL-MCNC: 10.5 MG/DL (ref 8.7–10.5)
CALCIUM SERPL-MCNC: 8.5 MG/DL (ref 8.7–10.5)
CALCIUM SERPL-MCNC: 8.7 MG/DL (ref 8.7–10.5)
CALCIUM SERPL-MCNC: 8.9 MG/DL (ref 8.7–10.5)
CALCIUM SERPL-MCNC: 8.9 MG/DL (ref 8.7–10.5)
CALCIUM SERPL-MCNC: 9 MG/DL (ref 8.7–10.5)
CALCIUM SERPL-MCNC: 9.1 MG/DL (ref 8.7–10.5)
CALCIUM SERPL-MCNC: 9.2 MG/DL (ref 8.7–10.5)
CALCIUM SERPL-MCNC: 9.2 MG/DL (ref 8.7–10.5)
CALCIUM SERPL-MCNC: 9.3 MG/DL (ref 8.7–10.5)
CALCIUM SERPL-MCNC: 9.4 MG/DL (ref 8.7–10.5)
CALCIUM SERPL-MCNC: 9.4 MG/DL (ref 8.7–10.5)
CALCIUM SERPL-MCNC: 9.5 MG/DL (ref 8.7–10.5)
CALCIUM SERPL-MCNC: 9.6 MG/DL (ref 8.7–10.5)
CALCIUM SERPL-MCNC: 9.6 MG/DL (ref 8.7–10.5)
CALCIUM SERPL-MCNC: 9.7 MG/DL (ref 8.7–10.5)
CALCIUM SERPL-MCNC: 9.8 MG/DL (ref 8.7–10.5)
CALCIUM SERPL-MCNC: 9.9 MG/DL (ref 8.7–10.5)
CHLORIDE SERPL-SCNC: 100 MMOL/L (ref 95–110)
CHLORIDE SERPL-SCNC: 100 MMOL/L (ref 95–110)
CHLORIDE SERPL-SCNC: 101 MMOL/L (ref 95–110)
CHLORIDE SERPL-SCNC: 102 MMOL/L (ref 95–110)
CHLORIDE SERPL-SCNC: 103 MMOL/L (ref 95–110)
CHLORIDE SERPL-SCNC: 104 MMOL/L (ref 95–110)
CHLORIDE SERPL-SCNC: 105 MMOL/L (ref 95–110)
CHLORIDE SERPL-SCNC: 106 MMOL/L (ref 95–110)
CHLORIDE SERPL-SCNC: 107 MMOL/L (ref 95–110)
CHLORIDE SERPL-SCNC: 92 MMOL/L (ref 95–110)
CHLORIDE SERPL-SCNC: 93 MMOL/L (ref 95–110)
CHLORIDE SERPL-SCNC: 94 MMOL/L (ref 95–110)
CHLORIDE SERPL-SCNC: 95 MMOL/L (ref 95–110)
CHLORIDE SERPL-SCNC: 96 MMOL/L (ref 95–110)
CHLORIDE SERPL-SCNC: 97 MMOL/L (ref 95–110)
CHLORIDE SERPL-SCNC: 98 MMOL/L (ref 95–110)
CHLORIDE SERPL-SCNC: 99 MMOL/L (ref 95–110)
CO2 SERPL-SCNC: 15 MMOL/L (ref 23–29)
CO2 SERPL-SCNC: 15 MMOL/L (ref 23–29)
CO2 SERPL-SCNC: 16 MMOL/L (ref 23–29)
CO2 SERPL-SCNC: 17 MMOL/L (ref 23–29)
CO2 SERPL-SCNC: 18 MMOL/L (ref 23–29)
CO2 SERPL-SCNC: 19 MMOL/L (ref 23–29)
CO2 SERPL-SCNC: 20 MMOL/L (ref 23–29)
CO2 SERPL-SCNC: 21 MMOL/L (ref 23–29)
CO2 SERPL-SCNC: 22 MMOL/L (ref 23–29)
CO2 SERPL-SCNC: 23 MMOL/L (ref 23–29)
CO2 SERPL-SCNC: 24 MMOL/L (ref 23–29)
CO2 SERPL-SCNC: 25 MMOL/L (ref 23–29)
CO2 SERPL-SCNC: 26 MMOL/L (ref 23–29)
CO2 SERPL-SCNC: 27 MMOL/L (ref 23–29)
CODING SYSTEM: NORMAL
CREAT SERPL-MCNC: 10.3 MG/DL (ref 0.5–1.4)
CREAT SERPL-MCNC: 10.8 MG/DL (ref 0.5–1.4)
CREAT SERPL-MCNC: 3.9 MG/DL (ref 0.5–1.4)
CREAT SERPL-MCNC: 4.6 MG/DL (ref 0.5–1.4)
CREAT SERPL-MCNC: 4.7 MG/DL (ref 0.5–1.4)
CREAT SERPL-MCNC: 4.9 MG/DL (ref 0.5–1.4)
CREAT SERPL-MCNC: 4.9 MG/DL (ref 0.5–1.4)
CREAT SERPL-MCNC: 5 MG/DL (ref 0.5–1.4)
CREAT SERPL-MCNC: 5 MG/DL (ref 0.5–1.4)
CREAT SERPL-MCNC: 5.1 MG/DL (ref 0.5–1.4)
CREAT SERPL-MCNC: 5.2 MG/DL (ref 0.5–1.4)
CREAT SERPL-MCNC: 5.2 MG/DL (ref 0.5–1.4)
CREAT SERPL-MCNC: 5.4 MG/DL (ref 0.5–1.4)
CREAT SERPL-MCNC: 5.5 MG/DL (ref 0.5–1.4)
CREAT SERPL-MCNC: 5.5 MG/DL (ref 0.5–1.4)
CREAT SERPL-MCNC: 5.6 MG/DL (ref 0.5–1.4)
CREAT SERPL-MCNC: 5.7 MG/DL (ref 0.5–1.4)
CREAT SERPL-MCNC: 5.8 MG/DL (ref 0.5–1.4)
CREAT SERPL-MCNC: 5.9 MG/DL (ref 0.5–1.4)
CREAT SERPL-MCNC: 5.9 MG/DL (ref 0.5–1.4)
CREAT SERPL-MCNC: 6.4 MG/DL (ref 0.5–1.4)
CREAT SERPL-MCNC: 6.4 MG/DL (ref 0.5–1.4)
CREAT SERPL-MCNC: 6.5 MG/DL (ref 0.5–1.4)
CREAT SERPL-MCNC: 6.5 MG/DL (ref 0.5–1.4)
CREAT SERPL-MCNC: 6.6 MG/DL (ref 0.5–1.4)
CREAT SERPL-MCNC: 6.7 MG/DL (ref 0.5–1.4)
CREAT SERPL-MCNC: 6.7 MG/DL (ref 0.5–1.4)
CREAT SERPL-MCNC: 6.8 MG/DL (ref 0.5–1.4)
CREAT SERPL-MCNC: 6.8 MG/DL (ref 0.5–1.4)
CREAT SERPL-MCNC: 6.9 MG/DL (ref 0.5–1.4)
CREAT SERPL-MCNC: 6.9 MG/DL (ref 0.5–1.4)
CREAT SERPL-MCNC: 7 MG/DL (ref 0.5–1.4)
CREAT SERPL-MCNC: 7 MG/DL (ref 0.5–1.4)
CREAT SERPL-MCNC: 7.1 MG/DL (ref 0.5–1.4)
CREAT SERPL-MCNC: 7.2 MG/DL (ref 0.5–1.4)
CREAT SERPL-MCNC: 7.3 MG/DL (ref 0.5–1.4)
CREAT SERPL-MCNC: 7.3 MG/DL (ref 0.5–1.4)
CREAT SERPL-MCNC: 7.4 MG/DL (ref 0.5–1.4)
CREAT SERPL-MCNC: 7.4 MG/DL (ref 0.5–1.4)
CREAT SERPL-MCNC: 7.5 MG/DL (ref 0.5–1.4)
CREAT SERPL-MCNC: 7.7 MG/DL (ref 0.5–1.4)
CREAT SERPL-MCNC: 7.8 MG/DL (ref 0.5–1.4)
CREAT SERPL-MCNC: 7.9 MG/DL (ref 0.5–1.4)
CREAT SERPL-MCNC: 7.9 MG/DL (ref 0.5–1.4)
CREAT SERPL-MCNC: 8.1 MG/DL (ref 0.5–1.4)
CREAT SERPL-MCNC: 8.2 MG/DL (ref 0.5–1.4)
CREAT SERPL-MCNC: 8.2 MG/DL (ref 0.5–1.4)
CREAT SERPL-MCNC: 8.4 MG/DL (ref 0.5–1.4)
CREAT SERPL-MCNC: 8.5 MG/DL (ref 0.5–1.4)
CREAT SERPL-MCNC: 8.8 MG/DL (ref 0.5–1.4)
CREAT SERPL-MCNC: 9 MG/DL (ref 0.5–1.4)
CREAT SERPL-MCNC: 9.3 MG/DL (ref 0.5–1.4)
CREAT SERPL-MCNC: 9.3 MG/DL (ref 0.5–1.4)
CREAT SERPL-MCNC: 9.4 MG/DL (ref 0.5–1.4)
CREATININE: 4.74 MG/DL (ref 0.5–1.5)
CRP SERPL-MCNC: 172.4 MG/L (ref 0–8.2)
CRP SERPL-MCNC: 217.1 MG/L (ref 0–8.2)
CRP SERPL-MCNC: 226.5 MG/L (ref 0–8.2)
CRP SERPL-MCNC: 32.4 MG/L (ref 0–8.2)
CRP: 103.7 MG/L (ref 0–5)
CTP QC/QA: YES
CV ECHO LV RWT: 0.28 CM
CV ECHO LV RWT: 0.33 CM
D DIMER PPP IA.FEU-MCNC: 0.89 MG/L FEU
D DIMER PPP IA.FEU-MCNC: 1.03 MG/L FEU
DELSYS: ABNORMAL
DIFFERENTIAL METHOD: ABNORMAL
DISPENSE STATUS: NORMAL
DOP CALC AO PEAK VEL: 0.86 M/S
DOP CALC AO PEAK VEL: 1.63 M/S
DOP CALC AO VTI: 16.69 CM
DOP CALC AO VTI: 26.13 CM
DOP CALC LVOT AREA: 3.2 CM2
DOP CALC LVOT AREA: 3.3 CM2
DOP CALC LVOT DIAMETER: 2.02 CM
DOP CALC LVOT DIAMETER: 2.04 CM
DOP CALC LVOT PEAK VEL: 0.92 M/S
DOP CALC LVOT PEAK VEL: 1.17 M/S
DOP CALC LVOT STROKE VOLUME: 57.34 CM3
DOP CALC LVOT STROKE VOLUME: 77.85 CM3
DOP CALCLVOT PEAK VEL VTI: 17.9 CM
DOP CALCLVOT PEAK VEL VTI: 23.83 CM
E/E' RATIO: 13.7 M/S
ECHO LV POSTERIOR WALL: 0.6 CM (ref 0.6–1.1)
ECHO LV POSTERIOR WALL: 0.9 CM (ref 0.6–1.1)
EJECTION FRACTION: 65 %
EJECTION FRACTION: 70 %
ENTEROVIRUS/RHINOVIRUS: NOT DETECTED
EOSINOPHIL # BLD AUTO: 0 K/UL (ref 0–0.5)
EOSINOPHIL # BLD AUTO: 0.1 K/UL (ref 0–0.5)
EOSINOPHIL # BLD AUTO: 0.2 K/UL (ref 0–0.5)
EOSINOPHIL NFR BLD: 0.1 % (ref 0–8)
EOSINOPHIL NFR BLD: 0.2 % (ref 0–8)
EOSINOPHIL NFR BLD: 0.4 % (ref 0–8)
EOSINOPHIL NFR BLD: 0.7 % (ref 0–8)
EOSINOPHIL NFR BLD: 0.7 % (ref 0–8)
EOSINOPHIL NFR BLD: 0.9 % (ref 0–8)
EOSINOPHIL NFR BLD: 0.9 % (ref 0–8)
EOSINOPHIL NFR BLD: 1.1 % (ref 0–8)
EOSINOPHIL NFR BLD: 1.3 % (ref 0–8)
EOSINOPHIL NFR BLD: 1.4 % (ref 0–8)
EOSINOPHIL NFR BLD: 1.4 % (ref 0–8)
EOSINOPHIL NFR BLD: 1.5 % (ref 0–8)
EOSINOPHIL NFR BLD: 1.6 % (ref 0–8)
EOSINOPHIL NFR BLD: 1.7 % (ref 0–8)
EOSINOPHIL NFR BLD: 1.8 % (ref 0–8)
EOSINOPHIL NFR BLD: 1.9 % (ref 0–8)
EOSINOPHIL NFR BLD: 1.9 % (ref 0–8)
EOSINOPHIL NFR BLD: 2 % (ref 0–8)
EOSINOPHIL NFR BLD: 2.1 % (ref 0–8)
EOSINOPHIL NFR BLD: 2.1 % (ref 0–8)
EOSINOPHIL NFR BLD: 2.2 % (ref 0–8)
EOSINOPHIL NFR BLD: 2.3 % (ref 0–8)
EOSINOPHIL NFR BLD: 2.3 % (ref 0–8)
EOSINOPHIL NFR BLD: 2.4 % (ref 0–8)
EOSINOPHIL NFR BLD: 2.4 % (ref 0–8)
EOSINOPHIL NFR BLD: 2.5 % (ref 0–8)
EOSINOPHIL NFR BLD: 2.6 % (ref 0–8)
EOSINOPHIL NFR BLD: 2.7 % (ref 0–8)
EOSINOPHIL NFR BLD: 2.7 % (ref 0–8)
EOSINOPHIL NFR BLD: 2.9 % (ref 0–8)
EOSINOPHIL NFR BLD: 3 % (ref 0–8)
EOSINOPHIL NFR BLD: 3.1 % (ref 0–8)
EOSINOPHIL NFR BLD: 3.2 % (ref 0–8)
EOSINOPHIL NFR BLD: 3.3 % (ref 0–8)
EOSINOPHIL NFR BLD: 3.4 % (ref 0–8)
EOSINOPHIL NFR BLD: 3.6 % (ref 0–8)
EOSINOPHIL NFR BLD: 3.6 % (ref 0–8)
EOSINOPHIL NFR BLD: 3.9 % (ref 0–8)
EOSINOPHIL NFR BLD: 4.4 % (ref 0–8)
EOSINOPHIL NFR BLD: 4.4 % (ref 0–8)
EP: 6
ERYTHROCYTE [DISTWIDTH] IN BLOOD BY AUTOMATED COUNT: 15.7 % (ref 11.5–14.5)
ERYTHROCYTE [DISTWIDTH] IN BLOOD BY AUTOMATED COUNT: 15.9 % (ref 11.5–14.5)
ERYTHROCYTE [DISTWIDTH] IN BLOOD BY AUTOMATED COUNT: 16 % (ref 11.5–14.5)
ERYTHROCYTE [DISTWIDTH] IN BLOOD BY AUTOMATED COUNT: 16 % (ref 11.5–14.5)
ERYTHROCYTE [DISTWIDTH] IN BLOOD BY AUTOMATED COUNT: 16.1 % (ref 11.5–14.5)
ERYTHROCYTE [DISTWIDTH] IN BLOOD BY AUTOMATED COUNT: 16.2 % (ref 11.5–14.5)
ERYTHROCYTE [DISTWIDTH] IN BLOOD BY AUTOMATED COUNT: 16.3 % (ref 11.5–14.5)
ERYTHROCYTE [DISTWIDTH] IN BLOOD BY AUTOMATED COUNT: 16.4 % (ref 11.5–14.5)
ERYTHROCYTE [DISTWIDTH] IN BLOOD BY AUTOMATED COUNT: 16.5 % (ref 11.5–14.5)
ERYTHROCYTE [DISTWIDTH] IN BLOOD BY AUTOMATED COUNT: 16.6 % (ref 11.5–14.5)
ERYTHROCYTE [DISTWIDTH] IN BLOOD BY AUTOMATED COUNT: 16.7 % (ref 11.5–14.5)
ERYTHROCYTE [DISTWIDTH] IN BLOOD BY AUTOMATED COUNT: 16.8 % (ref 11.5–14.5)
ERYTHROCYTE [DISTWIDTH] IN BLOOD BY AUTOMATED COUNT: 16.9 % (ref 11.5–14.5)
ERYTHROCYTE [DISTWIDTH] IN BLOOD BY AUTOMATED COUNT: 16.9 % (ref 11.5–14.5)
ERYTHROCYTE [DISTWIDTH] IN BLOOD BY AUTOMATED COUNT: 17.1 % (ref 11.5–14.5)
ERYTHROCYTE [DISTWIDTH] IN BLOOD BY AUTOMATED COUNT: 17.1 % (ref 11.5–14.5)
ERYTHROCYTE [DISTWIDTH] IN BLOOD BY AUTOMATED COUNT: 17.2 % (ref 11.5–14.5)
ERYTHROCYTE [DISTWIDTH] IN BLOOD BY AUTOMATED COUNT: 17.3 % (ref 11.5–14.5)
ERYTHROCYTE [DISTWIDTH] IN BLOOD BY AUTOMATED COUNT: 17.4 % (ref 11.5–14.5)
ERYTHROCYTE [DISTWIDTH] IN BLOOD BY AUTOMATED COUNT: 17.5 % (ref 11.5–14.5)
ERYTHROCYTE [DISTWIDTH] IN BLOOD BY AUTOMATED COUNT: 17.5 % (ref 11.5–14.5)
ERYTHROCYTE [DISTWIDTH] IN BLOOD BY AUTOMATED COUNT: 17.6 % (ref 11.5–14.5)
ERYTHROCYTE [DISTWIDTH] IN BLOOD BY AUTOMATED COUNT: 17.7 % (ref 11.5–14.5)
ERYTHROCYTE [DISTWIDTH] IN BLOOD BY AUTOMATED COUNT: 17.8 % (ref 11.5–14.5)
ERYTHROCYTE [DISTWIDTH] IN BLOOD BY AUTOMATED COUNT: 17.8 % (ref 11.5–14.5)
ERYTHROCYTE [DISTWIDTH] IN BLOOD BY AUTOMATED COUNT: 17.9 % (ref 11.5–14.5)
ERYTHROCYTE [DISTWIDTH] IN BLOOD BY AUTOMATED COUNT: 17.9 % (ref 11.5–14.5)
ERYTHROCYTE [DISTWIDTH] IN BLOOD BY AUTOMATED COUNT: 19 % (ref 11.5–14.5)
ERYTHROCYTE [DISTWIDTH] IN BLOOD BY AUTOMATED COUNT: 19.3 % (ref 11.5–14.5)
ERYTHROCYTE [DISTWIDTH] IN BLOOD BY AUTOMATED COUNT: 19.4 % (ref 11.5–14.5)
ERYTHROCYTE [SEDIMENTATION RATE] IN BLOOD BY WESTERGREN METHOD: 12 MM/H
ERYTHROCYTE [SEDIMENTATION RATE] IN BLOOD BY WESTERGREN METHOD: >120 MM/HR (ref 0–36)
EST. GFR  (AFRICAN AMERICAN): 10.4 ML/MIN/1.73 M^2
EST. GFR  (AFRICAN AMERICAN): 11 ML/MIN/1.73 M^2
EST. GFR  (AFRICAN AMERICAN): 13.1 ML/MIN/1.73 M^2
EST. GFR  (AFRICAN AMERICAN): 4 ML/MIN/1.73 M^2
EST. GFR  (AFRICAN AMERICAN): 4.5 ML/MIN/1.73 M^2
EST. GFR  (AFRICAN AMERICAN): 4.6 ML/MIN/1.73 M^2
EST. GFR  (AFRICAN AMERICAN): 4.8 ML/MIN/1.73 M^2
EST. GFR  (AFRICAN AMERICAN): 5 ML/MIN/1.73 M^2
EST. GFR  (AFRICAN AMERICAN): 5.1 ML/MIN/1.73 M^2
EST. GFR  (AFRICAN AMERICAN): 5.2 ML/MIN/1.73 M^2
EST. GFR  (AFRICAN AMERICAN): 5.3 ML/MIN/1.73 M^2
EST. GFR  (AFRICAN AMERICAN): 5.3 ML/MIN/1.73 M^2
EST. GFR  (AFRICAN AMERICAN): 5.4 ML/MIN/1.73 M^2
EST. GFR  (AFRICAN AMERICAN): 5.6 ML/MIN/1.73 M^2
EST. GFR  (AFRICAN AMERICAN): 5.6 ML/MIN/1.73 M^2
EST. GFR  (AFRICAN AMERICAN): 5.7 ML/MIN/1.73 M^2
EST. GFR  (AFRICAN AMERICAN): 5.8 ML/MIN/1.73 M^2
EST. GFR  (AFRICAN AMERICAN): 5.9 ML/MIN/1.73 M^2
EST. GFR  (AFRICAN AMERICAN): 6 ML/MIN/1.73 M^2
EST. GFR  (AFRICAN AMERICAN): 6 ML/MIN/1.73 M^2
EST. GFR  (AFRICAN AMERICAN): 6.1 ML/MIN/1.73 M^2
EST. GFR  (AFRICAN AMERICAN): 6.1 ML/MIN/1.73 M^2
EST. GFR  (AFRICAN AMERICAN): 6.2 ML/MIN/1.73 M^2
EST. GFR  (AFRICAN AMERICAN): 6.3 ML/MIN/1.73 M^2
EST. GFR  (AFRICAN AMERICAN): 6.5 ML/MIN/1.73 M^2
EST. GFR  (AFRICAN AMERICAN): 6.6 ML/MIN/1.73 M^2
EST. GFR  (AFRICAN AMERICAN): 6.6 ML/MIN/1.73 M^2
EST. GFR  (AFRICAN AMERICAN): 6.7 ML/MIN/1.73 M^2
EST. GFR  (AFRICAN AMERICAN): 6.7 ML/MIN/1.73 M^2
EST. GFR  (AFRICAN AMERICAN): 6.8 ML/MIN/1.73 M^2
EST. GFR  (AFRICAN AMERICAN): 6.8 ML/MIN/1.73 M^2
EST. GFR  (AFRICAN AMERICAN): 6.9 ML/MIN/1.73 M^2
EST. GFR  (AFRICAN AMERICAN): 7.1 ML/MIN/1.73 M^2
EST. GFR  (AFRICAN AMERICAN): 7.1 ML/MIN/1.73 M^2
EST. GFR  (AFRICAN AMERICAN): 7.2 ML/MIN/1.73 M^2
EST. GFR  (AFRICAN AMERICAN): 7.2 ML/MIN/1.73 M^2
EST. GFR  (AFRICAN AMERICAN): 7.9 ML/MIN/1.73 M^2
EST. GFR  (AFRICAN AMERICAN): 7.9 ML/MIN/1.73 M^2
EST. GFR  (AFRICAN AMERICAN): 8 ML/MIN/1.73 M^2
EST. GFR  (AFRICAN AMERICAN): 8.1 ML/MIN/1.73 M^2
EST. GFR  (AFRICAN AMERICAN): 8.5 ML/MIN/1.73 M^2
EST. GFR  (AFRICAN AMERICAN): 8.6 ML/MIN/1.73 M^2
EST. GFR  (AFRICAN AMERICAN): 8.6 ML/MIN/1.73 M^2
EST. GFR  (AFRICAN AMERICAN): 8.8 ML/MIN/1.73 M^2
EST. GFR  (AFRICAN AMERICAN): 8.8 ML/MIN/1.73 M^2
EST. GFR  (AFRICAN AMERICAN): 9 ML/MIN/1.73 M^2
EST. GFR  (AFRICAN AMERICAN): 9.2 ML/MIN/1.73 M^2
EST. GFR  (AFRICAN AMERICAN): 9.2 ML/MIN/1.73 M^2
EST. GFR  (AFRICAN AMERICAN): 9.5 ML/MIN/1.73 M^2
EST. GFR  (AFRICAN AMERICAN): 9.7 ML/MIN/1.73 M^2
EST. GFR  (AFRICAN AMERICAN): 9.7 ML/MIN/1.73 M^2
EST. GFR  (AFRICAN AMERICAN): 9.9 ML/MIN/1.73 M^2
EST. GFR  (AFRICAN AMERICAN): 9.9 ML/MIN/1.73 M^2
EST. GFR  (NON AFRICAN AMERICAN): 11.4 ML/MIN/1.73 M^2
EST. GFR  (NON AFRICAN AMERICAN): 3.5 ML/MIN/1.73 M^2
EST. GFR  (NON AFRICAN AMERICAN): 3.9 ML/MIN/1.73 M^2
EST. GFR  (NON AFRICAN AMERICAN): 4 ML/MIN/1.73 M^2
EST. GFR  (NON AFRICAN AMERICAN): 4 ML/MIN/1.73 M^2
EST. GFR  (NON AFRICAN AMERICAN): 4.1 ML/MIN/1.73 M^2
EST. GFR  (NON AFRICAN AMERICAN): 4.4 ML/MIN/1.73 M^2
EST. GFR  (NON AFRICAN AMERICAN): 4.5 ML/MIN/1.73 M^2
EST. GFR  (NON AFRICAN AMERICAN): 4.6 ML/MIN/1.73 M^2
EST. GFR  (NON AFRICAN AMERICAN): 4.6 ML/MIN/1.73 M^2
EST. GFR  (NON AFRICAN AMERICAN): 4.7 ML/MIN/1.73 M^2
EST. GFR  (NON AFRICAN AMERICAN): 4.8 ML/MIN/1.73 M^2
EST. GFR  (NON AFRICAN AMERICAN): 4.8 ML/MIN/1.73 M^2
EST. GFR  (NON AFRICAN AMERICAN): 4.9 ML/MIN/1.73 M^2
EST. GFR  (NON AFRICAN AMERICAN): 5 ML/MIN/1.73 M^2
EST. GFR  (NON AFRICAN AMERICAN): 5.1 ML/MIN/1.73 M^2
EST. GFR  (NON AFRICAN AMERICAN): 5.2 ML/MIN/1.73 M^2
EST. GFR  (NON AFRICAN AMERICAN): 5.2 ML/MIN/1.73 M^2
EST. GFR  (NON AFRICAN AMERICAN): 5.3 ML/MIN/1.73 M^2
EST. GFR  (NON AFRICAN AMERICAN): 5.3 ML/MIN/1.73 M^2
EST. GFR  (NON AFRICAN AMERICAN): 5.4 ML/MIN/1.73 M^2
EST. GFR  (NON AFRICAN AMERICAN): 5.5 ML/MIN/1.73 M^2
EST. GFR  (NON AFRICAN AMERICAN): 5.6 ML/MIN/1.73 M^2
EST. GFR  (NON AFRICAN AMERICAN): 5.7 ML/MIN/1.73 M^2
EST. GFR  (NON AFRICAN AMERICAN): 5.7 ML/MIN/1.73 M^2
EST. GFR  (NON AFRICAN AMERICAN): 5.8 ML/MIN/1.73 M^2
EST. GFR  (NON AFRICAN AMERICAN): 5.8 ML/MIN/1.73 M^2
EST. GFR  (NON AFRICAN AMERICAN): 5.9 ML/MIN/1.73 M^2
EST. GFR  (NON AFRICAN AMERICAN): 5.9 ML/MIN/1.73 M^2
EST. GFR  (NON AFRICAN AMERICAN): 6 ML/MIN/1.73 M^2
EST. GFR  (NON AFRICAN AMERICAN): 6.1 ML/MIN/1.73 M^2
EST. GFR  (NON AFRICAN AMERICAN): 6.1 ML/MIN/1.73 M^2
EST. GFR  (NON AFRICAN AMERICAN): 6.2 ML/MIN/1.73 M^2
EST. GFR  (NON AFRICAN AMERICAN): 6.2 ML/MIN/1.73 M^2
EST. GFR  (NON AFRICAN AMERICAN): 6.9 ML/MIN/1.73 M^2
EST. GFR  (NON AFRICAN AMERICAN): 6.9 ML/MIN/1.73 M^2
EST. GFR  (NON AFRICAN AMERICAN): 7 ML/MIN/1.73 M^2
EST. GFR  (NON AFRICAN AMERICAN): 7 ML/MIN/1.73 M^2
EST. GFR  (NON AFRICAN AMERICAN): 7.3 ML/MIN/1.73 M^2
EST. GFR  (NON AFRICAN AMERICAN): 7.5 ML/MIN/1.73 M^2
EST. GFR  (NON AFRICAN AMERICAN): 7.5 ML/MIN/1.73 M^2
EST. GFR  (NON AFRICAN AMERICAN): 7.7 ML/MIN/1.73 M^2
EST. GFR  (NON AFRICAN AMERICAN): 7.7 ML/MIN/1.73 M^2
EST. GFR  (NON AFRICAN AMERICAN): 8 ML/MIN/1.73 M^2
EST. GFR  (NON AFRICAN AMERICAN): 8.2 ML/MIN/1.73 M^2
EST. GFR  (NON AFRICAN AMERICAN): 8.4 ML/MIN/1.73 M^2
EST. GFR  (NON AFRICAN AMERICAN): 8.4 ML/MIN/1.73 M^2
EST. GFR  (NON AFRICAN AMERICAN): 8.6 ML/MIN/1.73 M^2
EST. GFR  (NON AFRICAN AMERICAN): 8.6 ML/MIN/1.73 M^2
EST. GFR  (NON AFRICAN AMERICAN): 9 ML/MIN/1.73 M^2
EST. GFR  (NON AFRICAN AMERICAN): 9.1 ML/MIN/1.73 M^2
ESTIMATED AVG GLUCOSE: 123 MG/DL (ref 68–131)
ESTIMATED AVG GLUCOSE: 148 MG/DL (ref 68–131)
FACT X PPP CHRO-ACNC: <0.1 IU/ML (ref 0.3–0.7)
FACT X PPP CHRO-ACNC: <0.1 IU/ML (ref 0.3–0.7)
FERRITIN SERPL-MCNC: 1432 NG/ML (ref 20–300)
FERRITIN SERPL-MCNC: 919 NG/ML (ref 20–300)
FINAL PATHOLOGIC DIAGNOSIS: NORMAL
FINAL PATHOLOGIC DIAGNOSIS: NORMAL
FIO2: 30
FLOW: 2
FRACTIONAL SHORTENING: 38 % (ref 28–44)
FRACTIONAL SHORTENING: 47 % (ref 28–44)
FUNGUS SPEC CULT: NORMAL
GLUCOSE SERPL-MCNC: 105 MG/DL (ref 70–110)
GLUCOSE SERPL-MCNC: 106 MG/DL (ref 70–110)
GLUCOSE SERPL-MCNC: 107 MG/DL (ref 70–110)
GLUCOSE SERPL-MCNC: 110 MG/DL (ref 70–110)
GLUCOSE SERPL-MCNC: 111 MG/DL (ref 70–110)
GLUCOSE SERPL-MCNC: 112 MG/DL (ref 70–110)
GLUCOSE SERPL-MCNC: 113 MG/DL (ref 70–110)
GLUCOSE SERPL-MCNC: 115 MG/DL (ref 70–110)
GLUCOSE SERPL-MCNC: 115 MG/DL (ref 70–110)
GLUCOSE SERPL-MCNC: 117 MG/DL (ref 70–110)
GLUCOSE SERPL-MCNC: 118 MG/DL (ref 70–110)
GLUCOSE SERPL-MCNC: 118 MG/DL (ref 70–110)
GLUCOSE SERPL-MCNC: 119 MG/DL (ref 70–110)
GLUCOSE SERPL-MCNC: 121 MG/DL (ref 70–110)
GLUCOSE SERPL-MCNC: 122 MG/DL (ref 70–110)
GLUCOSE SERPL-MCNC: 122 MG/DL (ref 70–110)
GLUCOSE SERPL-MCNC: 123 MG/DL (ref 70–110)
GLUCOSE SERPL-MCNC: 125 MG/DL (ref 70–110)
GLUCOSE SERPL-MCNC: 125 MG/DL (ref 70–110)
GLUCOSE SERPL-MCNC: 126 MG/DL (ref 70–110)
GLUCOSE SERPL-MCNC: 132 MG/DL (ref 70–110)
GLUCOSE SERPL-MCNC: 133 MG/DL (ref 70–110)
GLUCOSE SERPL-MCNC: 133 MG/DL (ref 70–110)
GLUCOSE SERPL-MCNC: 135 MG/DL (ref 70–110)
GLUCOSE SERPL-MCNC: 137 MG/DL (ref 70–110)
GLUCOSE SERPL-MCNC: 138 MG/DL (ref 70–110)
GLUCOSE SERPL-MCNC: 145 MG/DL (ref 70–110)
GLUCOSE SERPL-MCNC: 146 MG/DL (ref 70–110)
GLUCOSE SERPL-MCNC: 148 MG/DL (ref 70–110)
GLUCOSE SERPL-MCNC: 150 MG/DL (ref 70–110)
GLUCOSE SERPL-MCNC: 151 MG/DL (ref 70–110)
GLUCOSE SERPL-MCNC: 151 MG/DL (ref 70–110)
GLUCOSE SERPL-MCNC: 152 MG/DL (ref 70–110)
GLUCOSE SERPL-MCNC: 153 MG/DL (ref 70–110)
GLUCOSE SERPL-MCNC: 153 MG/DL (ref 70–110)
GLUCOSE SERPL-MCNC: 155 MG/DL (ref 70–110)
GLUCOSE SERPL-MCNC: 156 MG/DL (ref 70–110)
GLUCOSE SERPL-MCNC: 178 MG/DL (ref 70–110)
GLUCOSE SERPL-MCNC: 179 MG/DL (ref 70–110)
GLUCOSE SERPL-MCNC: 180 MG/DL (ref 70–110)
GLUCOSE SERPL-MCNC: 184 MG/DL (ref 70–110)
GLUCOSE SERPL-MCNC: 187 MG/DL (ref 70–110)
GLUCOSE SERPL-MCNC: 190 MG/DL (ref 70–110)
GLUCOSE SERPL-MCNC: 196 MG/DL (ref 70–110)
GLUCOSE SERPL-MCNC: 202 MG/DL (ref 70–110)
GLUCOSE SERPL-MCNC: 207 MG/DL (ref 70–110)
GLUCOSE SERPL-MCNC: 207 MG/DL (ref 70–110)
GLUCOSE SERPL-MCNC: 212 MG/DL (ref 70–110)
GLUCOSE SERPL-MCNC: 213 MG/DL (ref 70–110)
GLUCOSE SERPL-MCNC: 214 MG/DL (ref 70–110)
GLUCOSE SERPL-MCNC: 223 MG/DL (ref 70–110)
GLUCOSE SERPL-MCNC: 332 MG/DL (ref 70–110)
GLUCOSE SERPL-MCNC: 78 MG/DL (ref 70–110)
GLUCOSE SERPL-MCNC: 93 MG/DL (ref 70–110)
GLUCOSE SERPL-MCNC: 97 MG/DL (ref 70–110)
GLUCOSE: 186 MG/DL (ref 65–99)
GRAM STN SPEC: NORMAL
GRAM STN SPEC: NORMAL
GROSS: NORMAL
HAV IGM SERPL QL IA: NEGATIVE
HAV IGM SERPL QL IA: NEGATIVE
HBA1C MFR BLD: 5.9 % (ref 4–5.6)
HBA1C MFR BLD: 6.8 % (ref 4–5.6)
HBV CORE AB SERPL QL IA: NEGATIVE
HBV CORE IGM SERPL QL IA: NEGATIVE
HBV CORE IGM SERPL QL IA: NEGATIVE
HBV SURFACE AG SERPL QL IA: NEGATIVE
HCO3 UR-SCNC: 26.3 MMOL/L (ref 24–28)
HCO3 UR-SCNC: 28.4 MMOL/L (ref 24–28)
HCO3 UR-SCNC: 29.3 MMOL/L (ref 24–28)
HCO3 UR-SCNC: 30.2 MMOL/L (ref 24–28)
HCT VFR BLD AUTO: 20 % (ref 37–48.5)
HCT VFR BLD AUTO: 21.7 % (ref 37–48.5)
HCT VFR BLD AUTO: 22.7 % (ref 37–48.5)
HCT VFR BLD AUTO: 22.8 % (ref 37–48.5)
HCT VFR BLD AUTO: 22.8 % (ref 37–48.5)
HCT VFR BLD AUTO: 22.9 % (ref 37–48.5)
HCT VFR BLD AUTO: 23.1 % (ref 37–48.5)
HCT VFR BLD AUTO: 23.2 % (ref 37–48.5)
HCT VFR BLD AUTO: 23.4 % (ref 37–48.5)
HCT VFR BLD AUTO: 23.4 % (ref 37–48.5)
HCT VFR BLD AUTO: 23.5 % (ref 37–48.5)
HCT VFR BLD AUTO: 23.5 % (ref 37–48.5)
HCT VFR BLD AUTO: 23.7 % (ref 37–48.5)
HCT VFR BLD AUTO: 23.9 % (ref 37–48.5)
HCT VFR BLD AUTO: 24.1 % (ref 37–48.5)
HCT VFR BLD AUTO: 24.2 % (ref 37–48.5)
HCT VFR BLD AUTO: 24.2 % (ref 37–48.5)
HCT VFR BLD AUTO: 24.3 % (ref 37–48.5)
HCT VFR BLD AUTO: 24.4 % (ref 37–48.5)
HCT VFR BLD AUTO: 24.4 % (ref 37–48.5)
HCT VFR BLD AUTO: 24.5 % (ref 37–48.5)
HCT VFR BLD AUTO: 24.6 % (ref 37–48.5)
HCT VFR BLD AUTO: 24.6 % (ref 37–48.5)
HCT VFR BLD AUTO: 24.8 % (ref 37–48.5)
HCT VFR BLD AUTO: 25 % (ref 37–48.5)
HCT VFR BLD AUTO: 25.2 % (ref 37–48.5)
HCT VFR BLD AUTO: 25.4 % (ref 37–48.5)
HCT VFR BLD AUTO: 25.5 % (ref 37–48.5)
HCT VFR BLD AUTO: 25.5 % (ref 37–48.5)
HCT VFR BLD AUTO: 26.1 % (ref 37–48.5)
HCT VFR BLD AUTO: 26.2 % (ref 37–48.5)
HCT VFR BLD AUTO: 26.3 % (ref 37–48.5)
HCT VFR BLD AUTO: 26.3 % (ref 37–48.5)
HCT VFR BLD AUTO: 26.5 % (ref 37–48.5)
HCT VFR BLD AUTO: 26.7 % (ref 37–48.5)
HCT VFR BLD AUTO: 26.8 % (ref 37–48.5)
HCT VFR BLD AUTO: 27.4 % (ref 37–48.5)
HCT VFR BLD AUTO: 27.5 % (ref 37–48.5)
HCT VFR BLD AUTO: 27.6 % (ref 37–48.5)
HCT VFR BLD AUTO: 28.1 % (ref 37–48.5)
HCT VFR BLD AUTO: 28.1 % (ref 37–48.5)
HCT VFR BLD AUTO: 28.3 % (ref 37–48.5)
HCT VFR BLD AUTO: 28.4 % (ref 37–48.5)
HCT VFR BLD AUTO: 28.4 % (ref 37–48.5)
HCT VFR BLD AUTO: 28.5 % (ref 37–48.5)
HCT VFR BLD AUTO: 29.7 % (ref 37–48.5)
HCT VFR BLD AUTO: 29.9 % (ref 37–48.5)
HCT VFR BLD AUTO: 30.9 % (ref 37–48.5)
HCT VFR BLD AUTO: 31 % (ref 37–48.5)
HCT VFR BLD AUTO: 31.3 % (ref 37–48.5)
HCT VFR BLD AUTO: 32.8 % (ref 37–48.5)
HCT VFR BLD AUTO: 34.9 % (ref 37–48.5)
HCT VFR BLD AUTO: 38 % (ref 37–48.5)
HCT VFR BLD CALC: 22 %PCV (ref 36–54)
HCT VFR BLD CALC: 25 %PCV (ref 36–54)
HCT VFR BLD CALC: 26 %PCV (ref 36–54)
HCT VFR BLD CALC: 36 %PCV (ref 36–54)
HCV AB SERPL QL IA: NEGATIVE
HCV AB SERPL QL IA: NEGATIVE
HEMATOCRIT BLOOD: 21.5 % (ref 34.4–48)
HGB BLD-MCNC: 10 G/DL (ref 12–16)
HGB BLD-MCNC: 10.5 G/DL (ref 12–16)
HGB BLD-MCNC: 11.6 G/DL (ref 12–16)
HGB BLD-MCNC: 12 G/DL
HGB BLD-MCNC: 6 G/DL (ref 12–16)
HGB BLD-MCNC: 6.5 G/DL (ref 12–16)
HGB BLD-MCNC: 6.5 G/DL (ref 12–16)
HGB BLD-MCNC: 6.9 G/DL (ref 12–16)
HGB BLD-MCNC: 6.9 G/DL (ref 12–16)
HGB BLD-MCNC: 7 G/DL (ref 12–16)
HGB BLD-MCNC: 7.1 G/DL (ref 12–16)
HGB BLD-MCNC: 7.2 G/DL (ref 12–16)
HGB BLD-MCNC: 7.3 G/DL (ref 11.2–16)
HGB BLD-MCNC: 7.3 G/DL (ref 12–16)
HGB BLD-MCNC: 7.4 G/DL (ref 12–16)
HGB BLD-MCNC: 7.5 G/DL (ref 12–16)
HGB BLD-MCNC: 7.6 G/DL (ref 12–16)
HGB BLD-MCNC: 7.6 G/DL (ref 12–16)
HGB BLD-MCNC: 7.7 G/DL (ref 12–16)
HGB BLD-MCNC: 7.8 G/DL (ref 12–16)
HGB BLD-MCNC: 8 G/DL (ref 12–16)
HGB BLD-MCNC: 8.1 G/DL (ref 12–16)
HGB BLD-MCNC: 8.1 G/DL (ref 12–16)
HGB BLD-MCNC: 8.2 G/DL (ref 12–16)
HGB BLD-MCNC: 8.3 G/DL (ref 12–16)
HGB BLD-MCNC: 8.4 G/DL (ref 12–16)
HGB BLD-MCNC: 8.4 G/DL (ref 12–16)
HGB BLD-MCNC: 8.5 G/DL (ref 12–16)
HGB BLD-MCNC: 8.6 G/DL (ref 12–16)
HGB BLD-MCNC: 8.6 G/DL (ref 12–16)
HGB BLD-MCNC: 8.9 G/DL (ref 12–16)
HGB BLD-MCNC: 9 G/DL (ref 12–16)
HGB BLD-MCNC: 9.1 G/DL (ref 12–16)
HGB BLD-MCNC: 9.1 G/DL (ref 12–16)
HGB BLD-MCNC: 9.3 G/DL (ref 12–16)
HGB BLD-MCNC: 9.6 G/DL (ref 12–16)
HUMAN BOCAVIRUS: NOT DETECTED
HUMAN CORONAVIRUS, COMMON COLD VIRUS: NOT DETECTED
HYPOCHROMIA BLD QL SMEAR: ABNORMAL
IMM GRANULOCYTES # BLD AUTO: 0.01 K/UL (ref 0–0.04)
IMM GRANULOCYTES # BLD AUTO: 0.01 K/UL (ref 0–0.04)
IMM GRANULOCYTES # BLD AUTO: 0.02 K/UL (ref 0–0.04)
IMM GRANULOCYTES # BLD AUTO: 0.03 K/UL (ref 0–0.04)
IMM GRANULOCYTES # BLD AUTO: 0.04 K/UL (ref 0–0.04)
IMM GRANULOCYTES # BLD AUTO: 0.05 K/UL (ref 0–0.04)
IMM GRANULOCYTES # BLD AUTO: 0.06 K/UL (ref 0–0.04)
IMM GRANULOCYTES # BLD AUTO: 0.07 K/UL (ref 0–0.04)
IMM GRANULOCYTES # BLD AUTO: 0.08 K/UL (ref 0–0.04)
IMM GRANULOCYTES # BLD AUTO: 0.09 K/UL (ref 0–0.04)
IMM GRANULOCYTES # BLD AUTO: 0.1 K/UL (ref 0–0.04)
IMM GRANULOCYTES # BLD AUTO: 0.1 K/UL (ref 0–0.04)
IMM GRANULOCYTES # BLD AUTO: 0.11 K/UL (ref 0–0.04)
IMM GRANULOCYTES # BLD AUTO: 0.12 K/UL (ref 0–0.04)
IMM GRANULOCYTES NFR BLD AUTO: 0.2 % (ref 0–0.5)
IMM GRANULOCYTES NFR BLD AUTO: 0.2 % (ref 0–0.5)
IMM GRANULOCYTES NFR BLD AUTO: 0.3 % (ref 0–0.5)
IMM GRANULOCYTES NFR BLD AUTO: 0.3 % (ref 0–0.5)
IMM GRANULOCYTES NFR BLD AUTO: 0.4 % (ref 0–0.5)
IMM GRANULOCYTES NFR BLD AUTO: 0.5 % (ref 0–0.5)
IMM GRANULOCYTES NFR BLD AUTO: 0.6 % (ref 0–0.5)
IMM GRANULOCYTES NFR BLD AUTO: 0.7 % (ref 0–0.5)
IMM GRANULOCYTES NFR BLD AUTO: 0.8 % (ref 0–0.5)
IMM GRANULOCYTES NFR BLD AUTO: 0.9 % (ref 0–0.5)
IMM GRANULOCYTES NFR BLD AUTO: 1 % (ref 0–0.5)
IMM GRANULOCYTES NFR BLD AUTO: 1.1 % (ref 0–0.5)
IMM GRANULOCYTES NFR BLD AUTO: 1.2 % (ref 0–0.5)
IMM GRANULOCYTES NFR BLD AUTO: 1.3 % (ref 0–0.5)
IMM GRANULOCYTES NFR BLD AUTO: 1.4 % (ref 0–0.5)
IMM GRANULOCYTES NFR BLD AUTO: 1.5 % (ref 0–0.5)
IMM GRANULOCYTES NFR BLD AUTO: 1.5 % (ref 0–0.5)
IMM GRANULOCYTES NFR BLD AUTO: 1.9 % (ref 0–0.5)
IMM GRANULOCYTES NFR BLD AUTO: 2 % (ref 0–0.5)
INFLUENZA A - H1N1-09: NOT DETECTED
INR PPP: 1.1 (ref 0.8–1.2)
INR PPP: 1.2 (ref 0.8–1.2)
INTERVENTRICULAR SEPTUM: 0.67 CM (ref 0.6–1.1)
INTERVENTRICULAR SEPTUM: 0.84 CM (ref 0.6–1.1)
IP: 12
IVC PROX: 2.4 CM
LA MAJOR: 5.7 CM
LA MAJOR: 6.75 CM
LA MINOR: 5.13 CM
LA MINOR: 6 CM
LA WIDTH: 3.62 CM
LA WIDTH: 4.31 CM
LACTATE SERPL-SCNC: 0.9 MMOL/L (ref 0.5–2.2)
LACTATE SERPL-SCNC: 1.1 MMOL/L (ref 0.5–2.2)
LACTATE SERPL-SCNC: 1.2 MMOL/L (ref 0.5–2.2)
LACTATE SERPL-SCNC: 1.3 MMOL/L (ref 0.5–2.2)
LACTATE SERPL-SCNC: 2 MMOL/L (ref 0.5–2.2)
LACTATE SERPL-SCNC: 2 MMOL/L (ref 0.5–2.2)
LACTATE SERPL-SCNC: 2.5 MMOL/L (ref 0.5–2.2)
LDH SERPL L TO P-CCNC: 229 U/L (ref 110–260)
LEFT ATRIUM SIZE: 3.41 CM
LEFT ATRIUM SIZE: 3.91 CM
LEFT ATRIUM VOLUME INDEX MOD: 26.7 ML/M2
LEFT ATRIUM VOLUME INDEX: 29.6 ML/M2
LEFT ATRIUM VOLUME INDEX: 38.3 ML/M2
LEFT ATRIUM VOLUME MOD: 55.29 CM3
LEFT ATRIUM VOLUME: 61.34 CM3
LEFT ATRIUM VOLUME: 83.5 CM3
LEFT INTERNAL DIMENSION IN SYSTOLE: 2.24 CM (ref 2.1–4)
LEFT INTERNAL DIMENSION IN SYSTOLE: 3.35 CM (ref 2.1–4)
LEFT VENTRICLE DIASTOLIC VOLUME INDEX: 37.27 ML/M2
LEFT VENTRICLE DIASTOLIC VOLUME INDEX: 67.99 ML/M2
LEFT VENTRICLE DIASTOLIC VOLUME: 140.74 ML
LEFT VENTRICLE DIASTOLIC VOLUME: 81.24 ML
LEFT VENTRICLE MASS INDEX: 35 G/M2
LEFT VENTRICLE MASS INDEX: 83 G/M2
LEFT VENTRICLE SYSTOLIC VOLUME INDEX: 22.1 ML/M2
LEFT VENTRICLE SYSTOLIC VOLUME INDEX: 7.8 ML/M2
LEFT VENTRICLE SYSTOLIC VOLUME: 16.93 ML
LEFT VENTRICLE SYSTOLIC VOLUME: 45.68 ML
LEFT VENTRICULAR INTERNAL DIMENSION IN DIASTOLE: 4.26 CM (ref 3.5–6)
LEFT VENTRICULAR INTERNAL DIMENSION IN DIASTOLE: 5.39 CM (ref 3.5–6)
LEFT VENTRICULAR MASS: 171.89 G
LEFT VENTRICULAR MASS: 76.98 G
LEGIONELLA PNEUMOPHILA: NOT DETECTED
LV LATERAL E/E' RATIO: 13.7 M/S
LV SEPTAL E/E' RATIO: 13.7 M/S
LYMPHOCYTES # BLD AUTO: 0.7 K/UL (ref 1–4.8)
LYMPHOCYTES # BLD AUTO: 0.8 K/UL (ref 1–4.8)
LYMPHOCYTES # BLD AUTO: 0.9 K/UL (ref 1–4.8)
LYMPHOCYTES # BLD AUTO: 1 K/UL (ref 1–4.8)
LYMPHOCYTES # BLD AUTO: 1.1 K/UL (ref 1–4.8)
LYMPHOCYTES # BLD AUTO: 1.2 K/UL (ref 1–4.8)
LYMPHOCYTES # BLD AUTO: 1.3 K/UL (ref 1–4.8)
LYMPHOCYTES # BLD AUTO: 1.4 K/UL (ref 1–4.8)
LYMPHOCYTES # BLD AUTO: 1.5 K/UL (ref 1–4.8)
LYMPHOCYTES # BLD AUTO: 1.6 K/UL (ref 1–4.8)
LYMPHOCYTES # BLD AUTO: 1.7 K/UL (ref 1–4.8)
LYMPHOCYTES # BLD AUTO: 1.9 K/UL (ref 1–4.8)
LYMPHOCYTES NFR BLD: 10.3 % (ref 18–48)
LYMPHOCYTES NFR BLD: 10.3 % (ref 18–48)
LYMPHOCYTES NFR BLD: 10.5 % (ref 18–48)
LYMPHOCYTES NFR BLD: 10.9 % (ref 18–48)
LYMPHOCYTES NFR BLD: 10.9 % (ref 18–48)
LYMPHOCYTES NFR BLD: 11.4 % (ref 18–48)
LYMPHOCYTES NFR BLD: 11.5 % (ref 18–48)
LYMPHOCYTES NFR BLD: 12.3 % (ref 18–48)
LYMPHOCYTES NFR BLD: 12.7 % (ref 18–48)
LYMPHOCYTES NFR BLD: 13.8 % (ref 18–48)
LYMPHOCYTES NFR BLD: 14 % (ref 18–48)
LYMPHOCYTES NFR BLD: 14.1 % (ref 18–48)
LYMPHOCYTES NFR BLD: 15 % (ref 18–48)
LYMPHOCYTES NFR BLD: 17 % (ref 18–48)
LYMPHOCYTES NFR BLD: 17.1 % (ref 18–48)
LYMPHOCYTES NFR BLD: 17.2 % (ref 18–48)
LYMPHOCYTES NFR BLD: 18.1 % (ref 18–48)
LYMPHOCYTES NFR BLD: 18.1 % (ref 18–48)
LYMPHOCYTES NFR BLD: 18.5 % (ref 18–48)
LYMPHOCYTES NFR BLD: 18.5 % (ref 18–48)
LYMPHOCYTES NFR BLD: 18.7 % (ref 18–48)
LYMPHOCYTES NFR BLD: 18.9 % (ref 18–48)
LYMPHOCYTES NFR BLD: 19.8 % (ref 18–48)
LYMPHOCYTES NFR BLD: 19.8 % (ref 18–48)
LYMPHOCYTES NFR BLD: 19.9 % (ref 18–48)
LYMPHOCYTES NFR BLD: 20.2 % (ref 18–48)
LYMPHOCYTES NFR BLD: 20.4 % (ref 18–48)
LYMPHOCYTES NFR BLD: 20.8 % (ref 18–48)
LYMPHOCYTES NFR BLD: 21 % (ref 18–48)
LYMPHOCYTES NFR BLD: 21.1 % (ref 18–48)
LYMPHOCYTES NFR BLD: 21.1 % (ref 18–48)
LYMPHOCYTES NFR BLD: 21.2 % (ref 18–48)
LYMPHOCYTES NFR BLD: 21.2 % (ref 18–48)
LYMPHOCYTES NFR BLD: 21.6 % (ref 18–48)
LYMPHOCYTES NFR BLD: 21.8 % (ref 18–48)
LYMPHOCYTES NFR BLD: 21.8 % (ref 18–48)
LYMPHOCYTES NFR BLD: 22.7 % (ref 18–48)
LYMPHOCYTES NFR BLD: 23.2 % (ref 18–48)
LYMPHOCYTES NFR BLD: 23.5 % (ref 18–48)
LYMPHOCYTES NFR BLD: 23.8 % (ref 18–48)
LYMPHOCYTES NFR BLD: 24.4 % (ref 18–48)
LYMPHOCYTES NFR BLD: 24.8 % (ref 18–48)
LYMPHOCYTES NFR BLD: 25.1 % (ref 18–48)
LYMPHOCYTES NFR BLD: 25.2 % (ref 18–48)
LYMPHOCYTES NFR BLD: 25.2 % (ref 18–48)
LYMPHOCYTES NFR BLD: 27 % (ref 18–48)
LYMPHOCYTES NFR BLD: 27.2 % (ref 18–48)
LYMPHOCYTES NFR BLD: 27.3 % (ref 18–48)
LYMPHOCYTES NFR BLD: 27.9 % (ref 18–48)
LYMPHOCYTES NFR BLD: 28.2 % (ref 18–48)
LYMPHOCYTES NFR BLD: 29.1 % (ref 18–48)
LYMPHOCYTES NFR BLD: 8.6 % (ref 18–48)
LYMPHOCYTES NFR BLD: 9 % (ref 18–48)
LYMPHOCYTES NFR BLD: 9.6 % (ref 18–48)
LYMPHOCYTES NFR BLD: 9.6 % (ref 18–48)
Lab: NORMAL
Lab: NORMAL
MAGNESIUM SERPL-MCNC: 1.6 MG/DL (ref 1.6–2.6)
MAGNESIUM SERPL-MCNC: 1.7 MG/DL (ref 1.6–2.6)
MAGNESIUM SERPL-MCNC: 1.8 MG/DL (ref 1.6–2.6)
MAGNESIUM SERPL-MCNC: 1.9 MG/DL (ref 1.6–2.6)
MAGNESIUM SERPL-MCNC: 2 MG/DL (ref 1.6–2.6)
MAGNESIUM SERPL-MCNC: 2.1 MG/DL (ref 1.6–2.6)
MCH RBC QN AUTO: 26 PG (ref 27–31)
MCH RBC QN AUTO: 26.1 PG (ref 27–31)
MCH RBC QN AUTO: 26.2 PG (ref 27–31)
MCH RBC QN AUTO: 26.6 PG (ref 27–31)
MCH RBC QN AUTO: 26.7 PG (ref 27–31)
MCH RBC QN AUTO: 27.2 PG (ref 27–31)
MCH RBC QN AUTO: 27.3 PG (ref 27–31)
MCH RBC QN AUTO: 27.5 PG (ref 27–31)
MCH RBC QN AUTO: 27.7 PG (ref 27–31)
MCH RBC QN AUTO: 27.8 PG (ref 27–31)
MCH RBC QN AUTO: 27.9 PG (ref 27–31)
MCH RBC QN AUTO: 28 PG (ref 27–31)
MCH RBC QN AUTO: 28.1 PG (ref 27–31)
MCH RBC QN AUTO: 28.2 PG (ref 27–31)
MCH RBC QN AUTO: 28.3 PG (ref 27–31)
MCH RBC QN AUTO: 28.4 PG (ref 27–31)
MCH RBC QN AUTO: 28.5 PG (ref 27–31)
MCH RBC QN AUTO: 28.6 PG (ref 27–31)
MCH RBC QN AUTO: 28.6 PG (ref 27–31)
MCH RBC QN AUTO: 28.7 PG (ref 27–31)
MCH RBC QN AUTO: 28.8 PG (ref 27–31)
MCH RBC QN AUTO: 28.9 PG (ref 27–31)
MCH RBC QN AUTO: 29 PG (ref 27–31)
MCH RBC QN AUTO: 29 PG (ref 27–31)
MCH RBC QN AUTO: 29.1 PG (ref 27–31)
MCH RBC QN AUTO: 29.4 PG (ref 27–31)
MCH RBC QN AUTO: 29.6 PG (ref 27–31)
MCHC RBC AUTO-ENTMCNC: 26.8 G/DL (ref 32–36)
MCHC RBC AUTO-ENTMCNC: 27.8 G/DL (ref 32–36)
MCHC RBC AUTO-ENTMCNC: 28.1 G/DL (ref 32–36)
MCHC RBC AUTO-ENTMCNC: 28.8 G/DL (ref 32–36)
MCHC RBC AUTO-ENTMCNC: 29.1 G/DL (ref 32–36)
MCHC RBC AUTO-ENTMCNC: 29.3 G/DL (ref 32–36)
MCHC RBC AUTO-ENTMCNC: 29.3 G/DL (ref 32–36)
MCHC RBC AUTO-ENTMCNC: 29.4 G/DL (ref 32–36)
MCHC RBC AUTO-ENTMCNC: 29.7 G/DL (ref 32–36)
MCHC RBC AUTO-ENTMCNC: 29.8 G/DL (ref 32–36)
MCHC RBC AUTO-ENTMCNC: 29.9 G/DL (ref 32–36)
MCHC RBC AUTO-ENTMCNC: 30 G/DL (ref 32–36)
MCHC RBC AUTO-ENTMCNC: 30.1 G/DL (ref 32–36)
MCHC RBC AUTO-ENTMCNC: 30.2 G/DL (ref 32–36)
MCHC RBC AUTO-ENTMCNC: 30.3 G/DL (ref 32–36)
MCHC RBC AUTO-ENTMCNC: 30.4 G/DL (ref 32–36)
MCHC RBC AUTO-ENTMCNC: 30.5 G/DL (ref 32–36)
MCHC RBC AUTO-ENTMCNC: 30.6 G/DL (ref 32–36)
MCHC RBC AUTO-ENTMCNC: 30.7 G/DL (ref 32–36)
MCHC RBC AUTO-ENTMCNC: 30.8 G/DL (ref 32–36)
MCHC RBC AUTO-ENTMCNC: 31 G/DL (ref 32–36)
MCHC RBC AUTO-ENTMCNC: 31.1 G/DL (ref 32–36)
MCHC RBC AUTO-ENTMCNC: 31.1 G/DL (ref 32–36)
MCHC RBC AUTO-ENTMCNC: 31.2 G/DL (ref 32–36)
MCHC RBC AUTO-ENTMCNC: 31.4 G/DL (ref 32–36)
MCHC RBC AUTO-ENTMCNC: 31.5 G/DL (ref 32–36)
MCHC RBC AUTO-ENTMCNC: 32 G/DL (ref 32–36)
MCHC RBC AUTO-ENTMCNC: 32.1 G/DL (ref 32–36)
MCHC RBC AUTO-ENTMCNC: 32.1 G/DL (ref 32–36)
MCHC RBC AUTO-ENTMCNC: 32.4 G/DL (ref 32–36)
MCHC RBC AUTO-ENTMCNC: 32.6 G/DL (ref 32–36)
MCV RBC AUTO: 100 FL (ref 82–98)
MCV RBC AUTO: 86 FL (ref 82–98)
MCV RBC AUTO: 87 FL (ref 82–98)
MCV RBC AUTO: 88 FL (ref 82–98)
MCV RBC AUTO: 89 FL (ref 82–98)
MCV RBC AUTO: 90 FL (ref 82–98)
MCV RBC AUTO: 90 FL (ref 82–98)
MCV RBC AUTO: 91 FL (ref 82–98)
MCV RBC AUTO: 92 FL (ref 82–98)
MCV RBC AUTO: 93 FL (ref 82–98)
MCV RBC AUTO: 94 FL (ref 82–98)
MCV RBC AUTO: 95 FL (ref 82–98)
MCV RBC AUTO: 96 FL (ref 82–98)
MCV RBC AUTO: 97 FL (ref 82–98)
MCV RBC AUTO: 98 FL (ref 82–98)
MIN VOL: 18
MODE: ABNORMAL
MODE: ABNORMAL
MONOCYTES # BLD AUTO: 0.3 K/UL (ref 0.3–1)
MONOCYTES # BLD AUTO: 0.4 K/UL (ref 0.3–1)
MONOCYTES # BLD AUTO: 0.5 K/UL (ref 0.3–1)
MONOCYTES # BLD AUTO: 0.6 K/UL (ref 0.3–1)
MONOCYTES # BLD AUTO: 0.7 K/UL (ref 0.3–1)
MONOCYTES # BLD AUTO: 0.8 K/UL (ref 0.3–1)
MONOCYTES # BLD AUTO: 0.8 K/UL (ref 0.3–1)
MONOCYTES # BLD AUTO: 0.9 K/UL (ref 0.3–1)
MONOCYTES NFR BLD: 10.4 % (ref 4–15)
MONOCYTES NFR BLD: 10.8 % (ref 4–15)
MONOCYTES NFR BLD: 11.2 % (ref 4–15)
MONOCYTES NFR BLD: 3.4 % (ref 4–15)
MONOCYTES NFR BLD: 4.6 % (ref 4–15)
MONOCYTES NFR BLD: 5.3 % (ref 4–15)
MONOCYTES NFR BLD: 5.4 % (ref 4–15)
MONOCYTES NFR BLD: 5.4 % (ref 4–15)
MONOCYTES NFR BLD: 6.2 % (ref 4–15)
MONOCYTES NFR BLD: 6.2 % (ref 4–15)
MONOCYTES NFR BLD: 6.5 % (ref 4–15)
MONOCYTES NFR BLD: 6.6 % (ref 4–15)
MONOCYTES NFR BLD: 6.8 % (ref 4–15)
MONOCYTES NFR BLD: 6.9 % (ref 4–15)
MONOCYTES NFR BLD: 7 % (ref 4–15)
MONOCYTES NFR BLD: 7.2 % (ref 4–15)
MONOCYTES NFR BLD: 7.2 % (ref 4–15)
MONOCYTES NFR BLD: 7.3 % (ref 4–15)
MONOCYTES NFR BLD: 7.3 % (ref 4–15)
MONOCYTES NFR BLD: 7.4 % (ref 4–15)
MONOCYTES NFR BLD: 7.5 % (ref 4–15)
MONOCYTES NFR BLD: 7.5 % (ref 4–15)
MONOCYTES NFR BLD: 7.6 % (ref 4–15)
MONOCYTES NFR BLD: 7.6 % (ref 4–15)
MONOCYTES NFR BLD: 7.7 % (ref 4–15)
MONOCYTES NFR BLD: 7.8 % (ref 4–15)
MONOCYTES NFR BLD: 7.9 % (ref 4–15)
MONOCYTES NFR BLD: 8 % (ref 4–15)
MONOCYTES NFR BLD: 8.1 % (ref 4–15)
MONOCYTES NFR BLD: 8.2 % (ref 4–15)
MONOCYTES NFR BLD: 8.3 % (ref 4–15)
MONOCYTES NFR BLD: 8.4 % (ref 4–15)
MONOCYTES NFR BLD: 8.5 % (ref 4–15)
MONOCYTES NFR BLD: 8.6 % (ref 4–15)
MONOCYTES NFR BLD: 8.7 % (ref 4–15)
MONOCYTES NFR BLD: 8.7 % (ref 4–15)
MONOCYTES NFR BLD: 9.1 % (ref 4–15)
MONOCYTES NFR BLD: 9.2 % (ref 4–15)
MONOCYTES NFR BLD: 9.3 % (ref 4–15)
MONOCYTES NFR BLD: 9.3 % (ref 4–15)
MONOCYTES NFR BLD: 9.5 % (ref 4–15)
MONOCYTES NFR BLD: 9.6 % (ref 4–15)
MONOCYTES NFR BLD: 9.8 % (ref 4–15)
MORAXELLA CATARRHALIS: NOT DETECTED
MV PEAK E VEL: 1.37 M/S
MYCOBACTERIUM SPEC QL CULT: NORMAL
NEUTROPHILS # BLD AUTO: 2.3 K/UL (ref 1.8–7.7)
NEUTROPHILS # BLD AUTO: 2.4 K/UL (ref 1.8–7.7)
NEUTROPHILS # BLD AUTO: 2.5 K/UL (ref 1.8–7.7)
NEUTROPHILS # BLD AUTO: 2.6 K/UL (ref 1.8–7.7)
NEUTROPHILS # BLD AUTO: 2.7 K/UL (ref 1.8–7.7)
NEUTROPHILS # BLD AUTO: 2.8 K/UL (ref 1.8–7.7)
NEUTROPHILS # BLD AUTO: 2.8 K/UL (ref 1.8–7.7)
NEUTROPHILS # BLD AUTO: 2.9 K/UL (ref 1.8–7.7)
NEUTROPHILS # BLD AUTO: 3.1 K/UL (ref 1.8–7.7)
NEUTROPHILS # BLD AUTO: 3.1 K/UL (ref 1.8–7.7)
NEUTROPHILS # BLD AUTO: 3.2 K/UL (ref 1.8–7.7)
NEUTROPHILS # BLD AUTO: 3.4 K/UL (ref 1.8–7.7)
NEUTROPHILS # BLD AUTO: 3.5 K/UL (ref 1.8–7.7)
NEUTROPHILS # BLD AUTO: 3.6 K/UL (ref 1.8–7.7)
NEUTROPHILS # BLD AUTO: 3.6 K/UL (ref 1.8–7.7)
NEUTROPHILS # BLD AUTO: 3.7 K/UL (ref 1.8–7.7)
NEUTROPHILS # BLD AUTO: 3.7 K/UL (ref 1.8–7.7)
NEUTROPHILS # BLD AUTO: 3.8 K/UL (ref 1.8–7.7)
NEUTROPHILS # BLD AUTO: 4 K/UL (ref 1.8–7.7)
NEUTROPHILS # BLD AUTO: 4.2 K/UL (ref 1.8–7.7)
NEUTROPHILS # BLD AUTO: 4.3 K/UL (ref 1.8–7.7)
NEUTROPHILS # BLD AUTO: 4.4 K/UL (ref 1.8–7.7)
NEUTROPHILS # BLD AUTO: 4.4 K/UL (ref 1.8–7.7)
NEUTROPHILS # BLD AUTO: 4.5 K/UL (ref 1.8–7.7)
NEUTROPHILS # BLD AUTO: 4.6 K/UL (ref 1.8–7.7)
NEUTROPHILS # BLD AUTO: 4.6 K/UL (ref 1.8–7.7)
NEUTROPHILS # BLD AUTO: 4.7 K/UL (ref 1.8–7.7)
NEUTROPHILS # BLD AUTO: 4.8 K/UL (ref 1.8–7.7)
NEUTROPHILS # BLD AUTO: 5.2 K/UL (ref 1.8–7.7)
NEUTROPHILS # BLD AUTO: 5.5 K/UL (ref 1.8–7.7)
NEUTROPHILS # BLD AUTO: 5.6 K/UL (ref 1.8–7.7)
NEUTROPHILS # BLD AUTO: 5.6 K/UL (ref 1.8–7.7)
NEUTROPHILS # BLD AUTO: 5.9 K/UL (ref 1.8–7.7)
NEUTROPHILS # BLD AUTO: 6 K/UL (ref 1.8–7.7)
NEUTROPHILS # BLD AUTO: 6.5 K/UL (ref 1.8–7.7)
NEUTROPHILS # BLD AUTO: 6.7 K/UL (ref 1.8–7.7)
NEUTROPHILS # BLD AUTO: 6.8 K/UL (ref 1.8–7.7)
NEUTROPHILS # BLD AUTO: 6.8 K/UL (ref 1.8–7.7)
NEUTROPHILS # BLD AUTO: 7.1 K/UL (ref 1.8–7.7)
NEUTROPHILS # BLD AUTO: 7.1 K/UL (ref 1.8–7.7)
NEUTROPHILS # BLD AUTO: 7.6 K/UL (ref 1.8–7.7)
NEUTROPHILS # BLD AUTO: 7.7 K/UL (ref 1.8–7.7)
NEUTROPHILS # BLD AUTO: 7.8 K/UL (ref 1.8–7.7)
NEUTROPHILS # BLD AUTO: 7.9 K/UL (ref 1.8–7.7)
NEUTROPHILS # BLD AUTO: 8.4 K/UL (ref 1.8–7.7)
NEUTROPHILS # BLD AUTO: 8.6 K/UL (ref 1.8–7.7)
NEUTROPHILS # BLD AUTO: 8.9 K/UL (ref 1.8–7.7)
NEUTROPHILS # BLD AUTO: 9.5 K/UL (ref 1.8–7.7)
NEUTROPHILS # BLD AUTO: 9.9 K/UL (ref 1.8–7.7)
NEUTROPHILS NFR BLD: 57.1 % (ref 38–73)
NEUTROPHILS NFR BLD: 57.7 % (ref 38–73)
NEUTROPHILS NFR BLD: 59.1 % (ref 38–73)
NEUTROPHILS NFR BLD: 59.2 % (ref 38–73)
NEUTROPHILS NFR BLD: 60 % (ref 38–73)
NEUTROPHILS NFR BLD: 60.5 % (ref 38–73)
NEUTROPHILS NFR BLD: 60.5 % (ref 38–73)
NEUTROPHILS NFR BLD: 61.5 % (ref 38–73)
NEUTROPHILS NFR BLD: 63.1 % (ref 38–73)
NEUTROPHILS NFR BLD: 63.3 % (ref 38–73)
NEUTROPHILS NFR BLD: 63.6 % (ref 38–73)
NEUTROPHILS NFR BLD: 63.6 % (ref 38–73)
NEUTROPHILS NFR BLD: 63.7 % (ref 38–73)
NEUTROPHILS NFR BLD: 63.8 % (ref 38–73)
NEUTROPHILS NFR BLD: 64.4 % (ref 38–73)
NEUTROPHILS NFR BLD: 64.7 % (ref 38–73)
NEUTROPHILS NFR BLD: 65 % (ref 38–73)
NEUTROPHILS NFR BLD: 65.5 % (ref 38–73)
NEUTROPHILS NFR BLD: 66.3 % (ref 38–73)
NEUTROPHILS NFR BLD: 66.6 % (ref 38–73)
NEUTROPHILS NFR BLD: 66.7 % (ref 38–73)
NEUTROPHILS NFR BLD: 66.9 % (ref 38–73)
NEUTROPHILS NFR BLD: 67.1 % (ref 38–73)
NEUTROPHILS NFR BLD: 67.2 % (ref 38–73)
NEUTROPHILS NFR BLD: 67.4 % (ref 38–73)
NEUTROPHILS NFR BLD: 67.4 % (ref 38–73)
NEUTROPHILS NFR BLD: 67.9 % (ref 38–73)
NEUTROPHILS NFR BLD: 68.5 % (ref 38–73)
NEUTROPHILS NFR BLD: 68.7 % (ref 38–73)
NEUTROPHILS NFR BLD: 69 % (ref 38–73)
NEUTROPHILS NFR BLD: 69.7 % (ref 38–73)
NEUTROPHILS NFR BLD: 69.9 % (ref 38–73)
NEUTROPHILS NFR BLD: 71.2 % (ref 38–73)
NEUTROPHILS NFR BLD: 71.2 % (ref 38–73)
NEUTROPHILS NFR BLD: 71.5 % (ref 38–73)
NEUTROPHILS NFR BLD: 72.1 % (ref 38–73)
NEUTROPHILS NFR BLD: 72.3 % (ref 38–73)
NEUTROPHILS NFR BLD: 73.8 % (ref 38–73)
NEUTROPHILS NFR BLD: 75 % (ref 38–73)
NEUTROPHILS NFR BLD: 75 % (ref 38–73)
NEUTROPHILS NFR BLD: 75.4 % (ref 38–73)
NEUTROPHILS NFR BLD: 75.4 % (ref 38–73)
NEUTROPHILS NFR BLD: 77.6 % (ref 38–73)
NEUTROPHILS NFR BLD: 79.1 % (ref 38–73)
NEUTROPHILS NFR BLD: 79.2 % (ref 38–73)
NEUTROPHILS NFR BLD: 79.3 % (ref 38–73)
NEUTROPHILS NFR BLD: 79.5 % (ref 38–73)
NEUTROPHILS NFR BLD: 79.5 % (ref 38–73)
NEUTROPHILS NFR BLD: 79.7 % (ref 38–73)
NEUTROPHILS NFR BLD: 80.9 % (ref 38–73)
NEUTROPHILS NFR BLD: 81 % (ref 38–73)
NEUTROPHILS NFR BLD: 81.4 % (ref 38–73)
NEUTROPHILS NFR BLD: 81.6 % (ref 38–73)
NEUTROPHILS NFR BLD: 82.3 % (ref 38–73)
NEUTROPHILS NFR BLD: 87 % (ref 38–73)
NRBC BLD-RTO: 0 /100 WBC
NUM UNITS TRANS PACKED RBC: NORMAL
OVALOCYTES BLD QL SMEAR: ABNORMAL
PARAINFLUENZA: NOT DETECTED
PCO2 BLDA: 40.5 MMHG (ref 35–45)
PCO2 BLDA: 42.5 MMHG (ref 35–45)
PCO2 BLDA: 47.3 MMHG (ref 35–45)
PCO2 BLDA: 51.7 MMHG (ref 35–45)
PH SMN: 7.38 [PH] (ref 7.35–7.45)
PH SMN: 7.4 [PH] (ref 7.35–7.45)
PH SMN: 7.42 [PH] (ref 7.35–7.45)
PH SMN: 7.43 [PH] (ref 7.35–7.45)
PHOSPHATE SERPL-MCNC: 3.7 MG/DL (ref 2.7–4.5)
PHOSPHATE SERPL-MCNC: 4.4 MG/DL (ref 2.7–4.5)
PHOSPHATE SERPL-MCNC: 4.8 MG/DL (ref 2.7–4.5)
PHOSPHATE SERPL-MCNC: 5 MG/DL (ref 2.7–4.5)
PHOSPHATE SERPL-MCNC: 5.1 MG/DL (ref 2.7–4.5)
PHOSPHATE SERPL-MCNC: 5.2 MG/DL (ref 2.7–4.5)
PHOSPHATE SERPL-MCNC: 5.5 MG/DL (ref 2.7–4.5)
PHOSPHATE SERPL-MCNC: 5.6 MG/DL (ref 2.7–4.5)
PHOSPHATE SERPL-MCNC: 5.7 MG/DL (ref 2.7–4.5)
PHOSPHATE SERPL-MCNC: 5.9 MG/DL (ref 2.7–4.5)
PHOSPHATE SERPL-MCNC: 5.9 MG/DL (ref 2.7–4.5)
PHOSPHATE SERPL-MCNC: 6 MG/DL (ref 2.7–4.5)
PHOSPHATE SERPL-MCNC: 6.1 MG/DL (ref 2.7–4.5)
PHOSPHATE SERPL-MCNC: 6.2 MG/DL (ref 2.7–4.5)
PHOSPHATE SERPL-MCNC: 6.2 MG/DL (ref 2.7–4.5)
PHOSPHATE SERPL-MCNC: 6.3 MG/DL (ref 2.7–4.5)
PHOSPHATE SERPL-MCNC: 6.3 MG/DL (ref 2.7–4.5)
PHOSPHATE SERPL-MCNC: 6.5 MG/DL (ref 2.7–4.5)
PHOSPHATE SERPL-MCNC: 6.6 MG/DL (ref 2.7–4.5)
PHOSPHATE SERPL-MCNC: 6.6 MG/DL (ref 2.7–4.5)
PHOSPHATE SERPL-MCNC: 6.8 MG/DL (ref 2.7–4.5)
PHOSPHATE SERPL-MCNC: 6.9 MG/DL (ref 2.7–4.5)
PHOSPHATE SERPL-MCNC: 7.1 MG/DL (ref 2.7–4.5)
PHOSPHATE SERPL-MCNC: 7.2 MG/DL (ref 2.7–4.5)
PHOSPHATE SERPL-MCNC: 7.2 MG/DL (ref 2.7–4.5)
PHOSPHATE SERPL-MCNC: 7.3 MG/DL (ref 2.7–4.5)
PHOSPHATE SERPL-MCNC: 7.3 MG/DL (ref 2.7–4.5)
PHOSPHATE SERPL-MCNC: 7.4 MG/DL (ref 2.7–4.5)
PHOSPHATE SERPL-MCNC: 7.4 MG/DL (ref 2.7–4.5)
PHOSPHATE SERPL-MCNC: 7.6 MG/DL (ref 2.7–4.5)
PHOSPHATE SERPL-MCNC: 7.7 MG/DL (ref 2.7–4.5)
PHOSPHATE SERPL-MCNC: 7.9 MG/DL (ref 2.7–4.5)
PHOSPHATE SERPL-MCNC: 8 MG/DL (ref 2.7–4.5)
PHOSPHATE SERPL-MCNC: 8.3 MG/DL (ref 2.7–4.5)
PHOSPHATE SERPL-MCNC: 8.3 MG/DL (ref 2.7–4.5)
PHOSPHATE SERPL-MCNC: 8.9 MG/DL (ref 2.7–4.5)
PHOSPHATE SERPL-MCNC: 9.7 MG/DL (ref 2.7–4.5)
PISA TR MAX VEL: 2.52 M/S
PLATELET # BLD AUTO: 171 K/UL (ref 150–450)
PLATELET # BLD AUTO: 173 K/UL (ref 150–450)
PLATELET # BLD AUTO: 199 K/UL (ref 150–450)
PLATELET # BLD AUTO: 205 K/UL (ref 150–450)
PLATELET # BLD AUTO: 206 K/UL (ref 150–450)
PLATELET # BLD AUTO: 213 K/UL (ref 150–450)
PLATELET # BLD AUTO: 213 K/UL (ref 150–450)
PLATELET # BLD AUTO: 214 K/UL (ref 150–450)
PLATELET # BLD AUTO: 215 K/UL (ref 150–450)
PLATELET # BLD AUTO: 216 K/UL (ref 150–450)
PLATELET # BLD AUTO: 222 K/UL (ref 150–450)
PLATELET # BLD AUTO: 223 K/UL (ref 150–450)
PLATELET # BLD AUTO: 224 K/UL (ref 150–450)
PLATELET # BLD AUTO: 225 K/UL (ref 150–450)
PLATELET # BLD AUTO: 226 K/UL (ref 150–450)
PLATELET # BLD AUTO: 229 K/UL (ref 150–450)
PLATELET # BLD AUTO: 231 K/UL (ref 150–450)
PLATELET # BLD AUTO: 232 K/UL (ref 150–450)
PLATELET # BLD AUTO: 235 K/UL (ref 150–450)
PLATELET # BLD AUTO: 236 K/UL (ref 150–450)
PLATELET # BLD AUTO: 238 K/UL (ref 150–450)
PLATELET # BLD AUTO: 242 K/UL (ref 150–450)
PLATELET # BLD AUTO: 246 K/UL (ref 150–450)
PLATELET # BLD AUTO: 247 K/UL (ref 150–450)
PLATELET # BLD AUTO: 248 K/UL (ref 150–450)
PLATELET # BLD AUTO: 249 K/UL (ref 150–450)
PLATELET # BLD AUTO: 249 K/UL (ref 150–450)
PLATELET # BLD AUTO: 254 K/UL (ref 150–450)
PLATELET # BLD AUTO: 256 K/UL (ref 150–450)
PLATELET # BLD AUTO: 256 K/UL (ref 150–450)
PLATELET # BLD AUTO: 257 K/UL (ref 150–450)
PLATELET # BLD AUTO: 258 K/UL (ref 150–450)
PLATELET # BLD AUTO: 258 K/UL (ref 150–450)
PLATELET # BLD AUTO: 261 K/UL (ref 150–450)
PLATELET # BLD AUTO: 262 K/UL (ref 150–450)
PLATELET # BLD AUTO: 263 K/UL (ref 150–450)
PLATELET # BLD AUTO: 266 K/UL (ref 150–450)
PLATELET # BLD AUTO: 266 K/UL (ref 150–450)
PLATELET # BLD AUTO: 267 K/UL (ref 150–450)
PLATELET # BLD AUTO: 270 K/UL (ref 150–450)
PLATELET # BLD AUTO: 274 K/UL (ref 150–450)
PLATELET # BLD AUTO: 276 K/UL (ref 150–450)
PLATELET # BLD AUTO: 280 K/UL (ref 150–450)
PLATELET # BLD AUTO: 298 K/UL (ref 150–450)
PLATELET # BLD AUTO: 302 K/UL (ref 150–450)
PLATELET # BLD AUTO: 312 K/UL (ref 150–450)
PLATELET COUNT: 297 K/UL (ref 130–400)
PMV BLD AUTO: 10.2 FL (ref 9.2–12.9)
PMV BLD AUTO: 10.3 FL (ref 9.2–12.9)
PMV BLD AUTO: 10.4 FL (ref 9.2–12.9)
PMV BLD AUTO: 10.4 FL (ref 9.2–12.9)
PMV BLD AUTO: 10.5 FL (ref 9.2–12.9)
PMV BLD AUTO: 10.6 FL (ref 9.2–12.9)
PMV BLD AUTO: 10.7 FL (ref 9.2–12.9)
PMV BLD AUTO: 10.8 FL (ref 9.2–12.9)
PMV BLD AUTO: 10.9 FL (ref 9.2–12.9)
PMV BLD AUTO: 11 FL (ref 9.2–12.9)
PMV BLD AUTO: 11.1 FL (ref 9.2–12.9)
PMV BLD AUTO: 11.2 FL (ref 9.2–12.9)
PMV BLD AUTO: 11.3 FL (ref 9.2–12.9)
PMV BLD AUTO: 11.4 FL (ref 9.2–12.9)
PMV BLD AUTO: 11.5 FL (ref 9.2–12.9)
PO2 BLDA: 39 MMHG (ref 40–60)
PO2 BLDA: 62 MMHG (ref 80–100)
PO2 BLDA: 66 MMHG (ref 80–100)
PO2 BLDA: 69 MMHG (ref 80–100)
POC ACTIVATED CLOTTING TIME K: 197 SEC (ref 74–137)
POC ACTIVATED CLOTTING TIME K: 213 SEC (ref 74–137)
POC BE: 2 MMOL/L
POC BE: 4 MMOL/L
POC BE: 4 MMOL/L
POC BE: 5 MMOL/L
POC IONIZED CALCIUM: 0.99 MMOL/L (ref 1.06–1.42)
POC IONIZED CALCIUM: 1.04 MMOL/L (ref 1.06–1.42)
POC IONIZED CALCIUM: 1.13 MMOL/L (ref 1.06–1.42)
POC SATURATED O2: 73 % (ref 95–100)
POC SATURATED O2: 92 % (ref 95–100)
POC SATURATED O2: 93 % (ref 95–100)
POC SATURATED O2: 93 % (ref 95–100)
POC TCO2 (MEASURED): 23 MMOL/L (ref 23–29)
POC TCO2 (MEASURED): 26 MMOL/L (ref 23–29)
POC TCO2 (MEASURED): 29 MMOL/L (ref 23–29)
POC TCO2: 27 MMOL/L (ref 23–27)
POC TCO2: 30 MMOL/L (ref 23–27)
POC TCO2: 31 MMOL/L (ref 24–29)
POC TCO2: 32 MMOL/L (ref 23–27)
POCT GLUCOSE: 100 MG/DL (ref 70–110)
POCT GLUCOSE: 100 MG/DL (ref 70–110)
POCT GLUCOSE: 101 MG/DL (ref 70–110)
POCT GLUCOSE: 102 MG/DL (ref 70–110)
POCT GLUCOSE: 103 MG/DL (ref 70–110)
POCT GLUCOSE: 104 MG/DL (ref 70–110)
POCT GLUCOSE: 106 MG/DL (ref 70–110)
POCT GLUCOSE: 106 MG/DL (ref 70–110)
POCT GLUCOSE: 108 MG/DL (ref 70–110)
POCT GLUCOSE: 108 MG/DL (ref 70–110)
POCT GLUCOSE: 109 MG/DL (ref 70–110)
POCT GLUCOSE: 110 MG/DL (ref 70–110)
POCT GLUCOSE: 110 MG/DL (ref 70–110)
POCT GLUCOSE: 111 MG/DL (ref 70–110)
POCT GLUCOSE: 112 MG/DL (ref 70–110)
POCT GLUCOSE: 114 MG/DL (ref 70–110)
POCT GLUCOSE: 115 MG/DL (ref 70–110)
POCT GLUCOSE: 115 MG/DL (ref 70–110)
POCT GLUCOSE: 117 MG/DL (ref 70–110)
POCT GLUCOSE: 119 MG/DL (ref 70–110)
POCT GLUCOSE: 120 MG/DL (ref 70–110)
POCT GLUCOSE: 121 MG/DL (ref 70–110)
POCT GLUCOSE: 121 MG/DL (ref 70–110)
POCT GLUCOSE: 122 MG/DL (ref 70–110)
POCT GLUCOSE: 122 MG/DL (ref 70–110)
POCT GLUCOSE: 123 MG/DL (ref 70–110)
POCT GLUCOSE: 123 MG/DL (ref 70–110)
POCT GLUCOSE: 124 MG/DL (ref 70–110)
POCT GLUCOSE: 125 MG/DL (ref 70–110)
POCT GLUCOSE: 126 MG/DL (ref 70–110)
POCT GLUCOSE: 126 MG/DL (ref 70–110)
POCT GLUCOSE: 127 MG/DL (ref 70–110)
POCT GLUCOSE: 128 MG/DL (ref 70–110)
POCT GLUCOSE: 128 MG/DL (ref 70–110)
POCT GLUCOSE: 130 MG/DL (ref 70–110)
POCT GLUCOSE: 131 MG/DL (ref 70–110)
POCT GLUCOSE: 132 MG/DL (ref 70–110)
POCT GLUCOSE: 132 MG/DL (ref 70–110)
POCT GLUCOSE: 133 MG/DL (ref 70–110)
POCT GLUCOSE: 133 MG/DL (ref 70–110)
POCT GLUCOSE: 134 MG/DL (ref 70–110)
POCT GLUCOSE: 135 MG/DL (ref 70–110)
POCT GLUCOSE: 136 MG/DL (ref 70–110)
POCT GLUCOSE: 137 MG/DL (ref 70–110)
POCT GLUCOSE: 137 MG/DL (ref 70–110)
POCT GLUCOSE: 138 MG/DL (ref 70–110)
POCT GLUCOSE: 138 MG/DL (ref 70–110)
POCT GLUCOSE: 139 MG/DL (ref 70–110)
POCT GLUCOSE: 140 MG/DL (ref 70–110)
POCT GLUCOSE: 141 MG/DL (ref 70–110)
POCT GLUCOSE: 142 MG/DL (ref 70–110)
POCT GLUCOSE: 143 MG/DL (ref 70–110)
POCT GLUCOSE: 144 MG/DL (ref 70–110)
POCT GLUCOSE: 144 MG/DL (ref 70–110)
POCT GLUCOSE: 145 MG/DL (ref 70–110)
POCT GLUCOSE: 145 MG/DL (ref 70–110)
POCT GLUCOSE: 146 MG/DL (ref 70–110)
POCT GLUCOSE: 147 MG/DL (ref 70–110)
POCT GLUCOSE: 148 MG/DL (ref 70–110)
POCT GLUCOSE: 148 MG/DL (ref 70–110)
POCT GLUCOSE: 149 MG/DL (ref 70–110)
POCT GLUCOSE: 150 MG/DL (ref 70–110)
POCT GLUCOSE: 151 MG/DL (ref 70–110)
POCT GLUCOSE: 152 MG/DL (ref 70–110)
POCT GLUCOSE: 153 MG/DL (ref 70–110)
POCT GLUCOSE: 154 MG/DL (ref 70–110)
POCT GLUCOSE: 155 MG/DL (ref 70–110)
POCT GLUCOSE: 156 MG/DL (ref 70–110)
POCT GLUCOSE: 156 MG/DL (ref 70–110)
POCT GLUCOSE: 157 MG/DL (ref 70–110)
POCT GLUCOSE: 158 MG/DL (ref 70–110)
POCT GLUCOSE: 158 MG/DL (ref 70–110)
POCT GLUCOSE: 160 MG/DL (ref 70–110)
POCT GLUCOSE: 160 MG/DL (ref 70–110)
POCT GLUCOSE: 161 MG/DL (ref 70–110)
POCT GLUCOSE: 163 MG/DL (ref 70–110)
POCT GLUCOSE: 164 MG/DL (ref 70–110)
POCT GLUCOSE: 164 MG/DL (ref 70–110)
POCT GLUCOSE: 165 MG/DL (ref 70–110)
POCT GLUCOSE: 166 MG/DL (ref 70–110)
POCT GLUCOSE: 166 MG/DL (ref 70–110)
POCT GLUCOSE: 167 MG/DL (ref 70–110)
POCT GLUCOSE: 168 MG/DL (ref 70–110)
POCT GLUCOSE: 168 MG/DL (ref 70–110)
POCT GLUCOSE: 169 MG/DL (ref 70–110)
POCT GLUCOSE: 170 MG/DL (ref 70–110)
POCT GLUCOSE: 172 MG/DL (ref 70–110)
POCT GLUCOSE: 172 MG/DL (ref 70–110)
POCT GLUCOSE: 173 MG/DL (ref 70–110)
POCT GLUCOSE: 174 MG/DL (ref 70–110)
POCT GLUCOSE: 175 MG/DL (ref 70–110)
POCT GLUCOSE: 176 MG/DL (ref 70–110)
POCT GLUCOSE: 176 MG/DL (ref 70–110)
POCT GLUCOSE: 177 MG/DL (ref 70–110)
POCT GLUCOSE: 177 MG/DL (ref 70–110)
POCT GLUCOSE: 178 MG/DL (ref 70–110)
POCT GLUCOSE: 179 MG/DL (ref 70–110)
POCT GLUCOSE: 179 MG/DL (ref 70–110)
POCT GLUCOSE: 181 MG/DL (ref 70–110)
POCT GLUCOSE: 182 MG/DL (ref 70–110)
POCT GLUCOSE: 182 MG/DL (ref 70–110)
POCT GLUCOSE: 183 MG/DL (ref 70–110)
POCT GLUCOSE: 184 MG/DL (ref 70–110)
POCT GLUCOSE: 185 MG/DL (ref 70–110)
POCT GLUCOSE: 185 MG/DL (ref 70–110)
POCT GLUCOSE: 186 MG/DL (ref 70–110)
POCT GLUCOSE: 186 MG/DL (ref 70–110)
POCT GLUCOSE: 187 MG/DL (ref 70–110)
POCT GLUCOSE: 188 MG/DL (ref 70–110)
POCT GLUCOSE: 189 MG/DL (ref 70–110)
POCT GLUCOSE: 189 MG/DL (ref 70–110)
POCT GLUCOSE: 190 MG/DL (ref 70–110)
POCT GLUCOSE: 191 MG/DL (ref 70–110)
POCT GLUCOSE: 192 MG/DL (ref 70–110)
POCT GLUCOSE: 192 MG/DL (ref 70–110)
POCT GLUCOSE: 194 MG/DL (ref 70–110)
POCT GLUCOSE: 194 MG/DL (ref 70–110)
POCT GLUCOSE: 195 MG/DL (ref 70–110)
POCT GLUCOSE: 197 MG/DL (ref 70–110)
POCT GLUCOSE: 198 MG/DL (ref 70–110)
POCT GLUCOSE: 200 MG/DL (ref 70–110)
POCT GLUCOSE: 201 MG/DL (ref 70–110)
POCT GLUCOSE: 202 MG/DL (ref 70–110)
POCT GLUCOSE: 202 MG/DL (ref 70–110)
POCT GLUCOSE: 204 MG/DL (ref 70–110)
POCT GLUCOSE: 204 MG/DL (ref 70–110)
POCT GLUCOSE: 205 MG/DL (ref 70–110)
POCT GLUCOSE: 206 MG/DL (ref 70–110)
POCT GLUCOSE: 206 MG/DL (ref 70–110)
POCT GLUCOSE: 208 MG/DL (ref 70–110)
POCT GLUCOSE: 208 MG/DL (ref 70–110)
POCT GLUCOSE: 209 MG/DL (ref 70–110)
POCT GLUCOSE: 211 MG/DL (ref 70–110)
POCT GLUCOSE: 214 MG/DL (ref 70–110)
POCT GLUCOSE: 216 MG/DL (ref 70–110)
POCT GLUCOSE: 218 MG/DL (ref 70–110)
POCT GLUCOSE: 220 MG/DL (ref 70–110)
POCT GLUCOSE: 222 MG/DL (ref 70–110)
POCT GLUCOSE: 223 MG/DL (ref 70–110)
POCT GLUCOSE: 225 MG/DL (ref 70–110)
POCT GLUCOSE: 226 MG/DL (ref 70–110)
POCT GLUCOSE: 227 MG/DL (ref 70–110)
POCT GLUCOSE: 229 MG/DL (ref 70–110)
POCT GLUCOSE: 231 MG/DL (ref 70–110)
POCT GLUCOSE: 232 MG/DL (ref 70–110)
POCT GLUCOSE: 233 MG/DL (ref 70–110)
POCT GLUCOSE: 237 MG/DL (ref 70–110)
POCT GLUCOSE: 242 MG/DL (ref 70–110)
POCT GLUCOSE: 245 MG/DL (ref 70–110)
POCT GLUCOSE: 259 MG/DL (ref 70–110)
POCT GLUCOSE: 272 MG/DL (ref 70–110)
POCT GLUCOSE: 81 MG/DL (ref 70–110)
POCT GLUCOSE: 92 MG/DL (ref 70–110)
POCT GLUCOSE: 94 MG/DL (ref 70–110)
POIKILOCYTOSIS BLD QL SMEAR: SLIGHT
POLYCHROMASIA BLD QL SMEAR: ABNORMAL
POTASSIUM BLD-SCNC: 4.4 MMOL/L (ref 3.5–5.1)
POTASSIUM BLD-SCNC: 4.5 MMOL/L (ref 3.5–5.1)
POTASSIUM BLD-SCNC: 4.6 MMOL/L (ref 3.5–5.1)
POTASSIUM BLD-SCNC: 6.3 MMOL/L (ref 3.5–5.1)
POTASSIUM SERPL-SCNC: 3.3 MMOL/L (ref 3.5–5.1)
POTASSIUM SERPL-SCNC: 3.7 MMOL/L (ref 3.5–5.1)
POTASSIUM SERPL-SCNC: 3.9 MMOL/L (ref 3.5–5.1)
POTASSIUM SERPL-SCNC: 4 MMOL/L (ref 3.5–5.1)
POTASSIUM SERPL-SCNC: 4.1 MMOL/L (ref 3.5–5.1)
POTASSIUM SERPL-SCNC: 4.2 MMOL/L (ref 3.5–5.1)
POTASSIUM SERPL-SCNC: 4.2 MMOL/L (ref 3.5–5.1)
POTASSIUM SERPL-SCNC: 4.3 MMOL/L (ref 3.5–5.1)
POTASSIUM SERPL-SCNC: 4.4 MMOL/L (ref 3.5–5.1)
POTASSIUM SERPL-SCNC: 4.5 MMOL/L (ref 3.5–5.1)
POTASSIUM SERPL-SCNC: 4.6 MMOL/L (ref 3.5–5.1)
POTASSIUM SERPL-SCNC: 4.7 MMOL/L (ref 3.5–5.1)
POTASSIUM SERPL-SCNC: 4.8 MMOL/L (ref 3.5–5.1)
POTASSIUM SERPL-SCNC: 4.8 MMOL/L (ref 3.5–5.1)
POTASSIUM SERPL-SCNC: 4.9 MMOL/L (ref 3.5–5.1)
POTASSIUM SERPL-SCNC: 5 MMOL/L (ref 3.5–5.1)
POTASSIUM SERPL-SCNC: 5 MMOL/L (ref 3.5–5.1)
POTASSIUM SERPL-SCNC: 5.1 MMOL/L (ref 3.5–5.1)
POTASSIUM SERPL-SCNC: 5.3 MMOL/L (ref 3.5–5.1)
POTASSIUM SERPL-SCNC: 5.3 MMOL/L (ref 3.5–5.1)
POTASSIUM SERPL-SCNC: 5.7 MMOL/L (ref 3.5–5.1)
POTASSIUM SERPL-SCNC: 6.9 MMOL/L (ref 3.5–5.1)
POTASSIUM: 3.8 MMOL/L (ref 3.5–5.5)
PROCALCITONIN SERPL IA-MCNC: 0.27 NG/ML
PROCALCITONIN SERPL IA-MCNC: 0.58 NG/ML
PROT SERPL-MCNC: 6.5 G/DL (ref 6–8.4)
PROT SERPL-MCNC: 6.5 G/DL (ref 6–8.4)
PROT SERPL-MCNC: 6.6 G/DL (ref 6–8.4)
PROT SERPL-MCNC: 6.7 G/DL (ref 6–8.4)
PROT SERPL-MCNC: 6.7 G/DL (ref 6–8.4)
PROT SERPL-MCNC: 6.8 G/DL (ref 6–8.4)
PROT SERPL-MCNC: 6.9 G/DL (ref 6–8.4)
PROT SERPL-MCNC: 7 G/DL (ref 6–8.4)
PROT SERPL-MCNC: 7.1 G/DL (ref 6–8.4)
PROT SERPL-MCNC: 7.2 G/DL (ref 6–8.4)
PROT SERPL-MCNC: 7.3 G/DL (ref 6–8.4)
PROT SERPL-MCNC: 7.4 G/DL (ref 6–8.4)
PROT SERPL-MCNC: 7.5 G/DL (ref 6–8.4)
PROT SERPL-MCNC: 7.5 G/DL (ref 6–8.4)
PROT SERPL-MCNC: 7.7 G/DL (ref 6–8.4)
PROTHROMBIN TIME: 12.3 SEC (ref 9–12.5)
PROTHROMBIN TIME: 12.5 SEC (ref 9–12.5)
PTH-INTACT SERPL-MCNC: 356 PG/ML (ref 9–77)
RA MAJOR: 4.88 CM
RA MAJOR: 5.48 CM
RA PRESSURE: 15 MMHG
RA PRESSURE: 15 MMHG
RA WIDTH: 3.22 CM
RA WIDTH: 4.16 CM
RBC # BLD AUTO: 2.13 M/UL (ref 4–5.4)
RBC # BLD AUTO: 2.38 M/UL (ref 4–5.4)
RBC # BLD AUTO: 2.38 M/UL (ref 4–5.4)
RBC # BLD AUTO: 2.44 M/UL (ref 4–5.4)
RBC # BLD AUTO: 2.44 M/UL (ref 4–5.4)
RBC # BLD AUTO: 2.49 M/UL (ref 4–5.4)
RBC # BLD AUTO: 2.51 M/UL (ref 4–5.4)
RBC # BLD AUTO: 2.52 M/UL (ref 4–5.4)
RBC # BLD AUTO: 2.54 M/UL (ref 4–5.4)
RBC # BLD AUTO: 2.54 M/UL (ref 4–5.4)
RBC # BLD AUTO: 2.55 M/UL (ref 4–5.4)
RBC # BLD AUTO: 2.56 M/UL (ref 4–5.4)
RBC # BLD AUTO: 2.56 M/UL (ref 4–5.4)
RBC # BLD AUTO: 2.57 M/UL (ref 4–5.4)
RBC # BLD AUTO: 2.6 M/UL (ref 4–5.4)
RBC # BLD AUTO: 2.61 M/UL (ref 4–5.4)
RBC # BLD AUTO: 2.62 M/UL (ref 4–5.4)
RBC # BLD AUTO: 2.64 M/UL (ref 4–5.4)
RBC # BLD AUTO: 2.64 M/UL (ref 4–5.4)
RBC # BLD AUTO: 2.65 M/UL (ref 4–5.4)
RBC # BLD AUTO: 2.69 M/UL (ref 4–5.4)
RBC # BLD AUTO: 2.73 M/UL (ref 4–5.4)
RBC # BLD AUTO: 2.74 M/UL (ref 4–5.4)
RBC # BLD AUTO: 2.74 M/UL (ref 4–5.4)
RBC # BLD AUTO: 2.77 M/UL (ref 4–5.4)
RBC # BLD AUTO: 2.78 M/UL (ref 4–5.4)
RBC # BLD AUTO: 2.81 M/UL (ref 4–5.4)
RBC # BLD AUTO: 2.87 M/UL (ref 4–5.4)
RBC # BLD AUTO: 2.92 M/UL (ref 4–5.4)
RBC # BLD AUTO: 2.93 M/UL (ref 4–5.4)
RBC # BLD AUTO: 2.95 M/UL (ref 4–5.4)
RBC # BLD AUTO: 2.95 M/UL (ref 4–5.4)
RBC # BLD AUTO: 2.99 M/UL (ref 4–5.4)
RBC # BLD AUTO: 3.03 M/UL (ref 4–5.4)
RBC # BLD AUTO: 3.03 M/UL (ref 4–5.4)
RBC # BLD AUTO: 3.04 M/UL (ref 4–5.4)
RBC # BLD AUTO: 3.08 M/UL (ref 4–5.4)
RBC # BLD AUTO: 3.11 M/UL (ref 4–5.4)
RBC # BLD AUTO: 3.11 M/UL (ref 4–5.4)
RBC # BLD AUTO: 3.21 M/UL (ref 4–5.4)
RBC # BLD AUTO: 3.26 M/UL (ref 4–5.4)
RBC # BLD AUTO: 3.27 M/UL (ref 4–5.4)
RBC # BLD AUTO: 3.34 M/UL (ref 4–5.4)
RBC # BLD AUTO: 3.35 M/UL (ref 4–5.4)
RBC # BLD AUTO: 3.82 M/UL (ref 4–5.4)
RBC # BLD AUTO: 4.04 M/UL (ref 4–5.4)
RBC # BLD AUTO: 4.44 M/UL (ref 4–5.4)
RIGHT VENTRICULAR END-DIASTOLIC DIMENSION: 3.69 CM
RV TISSUE DOPPLER FREE WALL SYSTOLIC VELOCITY 1 (APICAL 4 CHAMBER VIEW): 12.97 CM/S
RVP - ADENOVIRUS: NOT DETECTED
RVP - HUMAN METAPNEUMOVIRUS (HMPV): NOT DETECTED
RVP - INFLUENZA A: NOT DETECTED
RVP - INFLUENZA B: NOT DETECTED
RVP - RESPIRATORY SYNCTIAL VIRUS (RSV) A: NOT DETECTED
RVP - RESPIRATORY VIRAL PANEL, SOURCE: NORMAL
SAMPLE: ABNORMAL
SARS-COV-2 RDRP RESP QL NAA+PROBE: NEGATIVE
SARS-COV-2 RNA RESP QL NAA+PROBE: DETECTED
SARS-COV-2 RNA RESP QL NAA+PROBE: NOT DETECTED
SARS-COV-2- CYCLE NUMBER: 29.82
SED RATE (WESTERGREN): 96 MM/HR (ref 0–30)
SINUS: 2.59 CM
SINUS: 3.2 CM
SITE: ABNORMAL
SODIUM BLD-SCNC: 134 MMOL/L (ref 136–145)
SODIUM BLD-SCNC: 134 MMOL/L (ref 136–145)
SODIUM BLD-SCNC: 136 MMOL/L (ref 136–145)
SODIUM BLD-SCNC: 138 MMOL/L (ref 136–145)
SODIUM SERPL-SCNC: 132 MMOL/L (ref 136–145)
SODIUM SERPL-SCNC: 133 MMOL/L (ref 136–145)
SODIUM SERPL-SCNC: 134 MMOL/L (ref 136–145)
SODIUM SERPL-SCNC: 135 MMOL/L (ref 136–145)
SODIUM SERPL-SCNC: 136 MMOL/L (ref 136–145)
SODIUM SERPL-SCNC: 137 MMOL/L (ref 136–145)
SODIUM SERPL-SCNC: 138 MMOL/L (ref 136–145)
SODIUM SERPL-SCNC: 139 MMOL/L (ref 136–145)
SODIUM SERPL-SCNC: 140 MMOL/L (ref 136–145)
SODIUM SERPL-SCNC: 141 MMOL/L (ref 136–145)
SODIUM SERPL-SCNC: 141 MMOL/L (ref 136–145)
SODIUM SERPL-SCNC: 142 MMOL/L (ref 136–145)
SODIUM SERPL-SCNC: 142 MMOL/L (ref 136–145)
SODIUM: 134 MMOL/L (ref 135–148)
SP02: 93
SPHEROCYTES BLD QL SMEAR: ABNORMAL
SPONT RATE: 28
STJ: 2.97 CM
TDI LATERAL: 0.08 M/S
TDI LATERAL: 0.1 M/S
TDI SEPTAL: 0.09 M/S
TDI SEPTAL: 0.1 M/S
TDI: 0.09 M/S
TDI: 0.1 M/S
TEM - ACINETOBACTER BAUMANNII: NOT DETECTED
TEM - BORDETELLA PERTUSSIS: NOT DETECTED
TEM - CHLAMYDOPHILA PNEUMONIAE: NOT DETECTED
TEM - KLEBSIELLA PNEUMONIAE: NOT DETECTED
TEM - MRSA: NOT DETECTED
TEM - MYCOPLASMA PNEUMONIAE: NOT DETECTED
TEM - NEISSERIA MENINGITIDIS: NOT DETECTED
TEM - PANTON-VALENTINE: NOT DETECTED
TEM - PSEUDOMONAS AERUGINOSA: NOT DETECTED
TEM - STAPHYLOCOCCUS AUREUS: NOT DETECTED
TEM - STREPTOCOCCUS PNEUMONIAE: NOT DETECTED
TEM - STREPTOCOCCUS PYOGENES A: NOT DETECTED
TEM- HAEMOPHILUS INFLUENZAE B: NOT DETECTED
TEM- HAEMOPHILUS INFLUENZAE: NOT DETECTED
TR MAX PG: 25 MMHG
TRANS ERYTHROCYTES VOL PATIENT: NORMAL ML
TRICUSPID ANNULAR PLANE SYSTOLIC EXCURSION: 1.61 CM
TRICUSPID ANNULAR PLANE SYSTOLIC EXCURSION: 2.14 CM
TROPONIN I SERPL DL<=0.01 NG/ML-MCNC: 0.01 NG/ML (ref 0–0.03)
TROPONIN I SERPL DL<=0.01 NG/ML-MCNC: 0.02 NG/ML (ref 0–0.03)
TROPONIN I SERPL DL<=0.01 NG/ML-MCNC: 0.04 NG/ML (ref 0–0.03)
TV REST PULMONARY ARTERY PRESSURE: 40 MMHG
VANCOMYCIN SERPL-MCNC: 15.6 UG/ML
VANCOMYCIN SERPL-MCNC: 15.9 UG/ML
VANCOMYCIN SERPL-MCNC: 17 UG/ML
VANCOMYCIN SERPL-MCNC: 17.2 UG/ML
VANCOMYCIN SERPL-MCNC: 17.9 UG/ML
VANCOMYCIN SERPL-MCNC: 18 UG/ML
VANCOMYCIN SERPL-MCNC: 18.5 UG/ML
VANCOMYCIN SERPL-MCNC: 18.9 UG/ML
VANCOMYCIN SERPL-MCNC: 20.2 UG/ML
VANCOMYCIN SERPL-MCNC: 20.9 UG/ML
VANCOMYCIN SERPL-MCNC: 21.9 UG/ML
VANCOMYCIN SERPL-MCNC: 22.9 UG/ML
VANCOMYCIN SERPL-MCNC: 24.2 UG/ML
WBC # BLD AUTO: 10.11 K/UL (ref 3.9–12.7)
WBC # BLD AUTO: 10.51 K/UL (ref 3.9–12.7)
WBC # BLD AUTO: 10.91 K/UL (ref 3.9–12.7)
WBC # BLD AUTO: 11.2 K/UL (ref 3.9–12.7)
WBC # BLD AUTO: 11.71 K/UL (ref 3.9–12.7)
WBC # BLD AUTO: 12.07 K/UL (ref 3.9–12.7)
WBC # BLD AUTO: 3.43 K/UL (ref 3.9–12.7)
WBC # BLD AUTO: 4.15 K/UL (ref 3.9–12.7)
WBC # BLD AUTO: 4.22 K/UL (ref 3.9–12.7)
WBC # BLD AUTO: 4.36 K/UL (ref 3.9–12.7)
WBC # BLD AUTO: 4.44 K/UL (ref 3.9–12.7)
WBC # BLD AUTO: 4.5 K/UL (ref 3.9–12.7)
WBC # BLD AUTO: 4.6 K/UL (ref 3.9–12.7)
WBC # BLD AUTO: 4.63 K/UL (ref 3.9–12.7)
WBC # BLD AUTO: 4.68 K/UL (ref 3.9–12.7)
WBC # BLD AUTO: 4.81 K/UL (ref 3.9–12.7)
WBC # BLD AUTO: 4.82 K/UL (ref 3.9–12.7)
WBC # BLD AUTO: 4.83 K/UL (ref 3.9–12.7)
WBC # BLD AUTO: 4.99 K/UL (ref 3.9–12.7)
WBC # BLD AUTO: 5.27 K/UL (ref 3.9–12.7)
WBC # BLD AUTO: 5.27 K/UL (ref 3.9–12.7)
WBC # BLD AUTO: 5.37 K/UL (ref 3.9–12.7)
WBC # BLD AUTO: 5.43 K/UL (ref 3.9–12.7)
WBC # BLD AUTO: 5.49 K/UL (ref 3.9–12.7)
WBC # BLD AUTO: 5.52 K/UL (ref 3.9–12.7)
WBC # BLD AUTO: 5.54 K/UL (ref 3.9–12.7)
WBC # BLD AUTO: 5.6 K/UL (ref 3.9–12.7)
WBC # BLD AUTO: 5.82 K/UL (ref 3.9–12.7)
WBC # BLD AUTO: 6.36 K/UL (ref 3.9–12.7)
WBC # BLD AUTO: 6.38 K/UL (ref 3.9–12.7)
WBC # BLD AUTO: 6.42 K/UL (ref 3.9–12.7)
WBC # BLD AUTO: 6.46 K/UL (ref 3.9–12.7)
WBC # BLD AUTO: 6.51 K/UL (ref 3.9–12.7)
WBC # BLD AUTO: 6.62 K/UL (ref 3.9–12.7)
WBC # BLD AUTO: 6.63 K/UL (ref 3.9–12.7)
WBC # BLD AUTO: 6.65 K/UL (ref 3.9–12.7)
WBC # BLD AUTO: 6.68 K/UL (ref 3.9–12.7)
WBC # BLD AUTO: 6.8 K/UL (ref 3.9–12.7)
WBC # BLD AUTO: 6.81 K/UL (ref 3.9–12.7)
WBC # BLD AUTO: 6.81 K/UL (ref 3.9–12.7)
WBC # BLD AUTO: 6.97 K/UL (ref 3.9–12.7)
WBC # BLD AUTO: 7.14 K/UL (ref 3.9–12.7)
WBC # BLD AUTO: 7.23 K/UL (ref 3.9–12.7)
WBC # BLD AUTO: 7.25 K/UL (ref 3.9–12.7)
WBC # BLD AUTO: 7.32 K/UL (ref 3.9–12.7)
WBC # BLD AUTO: 7.44 K/UL (ref 3.9–12.7)
WBC # BLD AUTO: 7.46 K/UL (ref 3.9–12.7)
WBC # BLD AUTO: 7.47 K/UL (ref 3.9–12.7)
WBC # BLD AUTO: 8.52 K/UL (ref 3.9–12.7)
WBC # BLD AUTO: 8.52 K/UL (ref 3.9–12.7)
WBC # BLD AUTO: 9.03 K/UL (ref 3.9–12.7)
WBC # BLD AUTO: 9.08 K/UL (ref 3.9–12.7)
WBC # BLD AUTO: 9.11 K/UL (ref 3.9–12.7)
WBC # BLD AUTO: 9.29 K/UL (ref 3.9–12.7)
WBC # BLD AUTO: 9.43 K/UL (ref 3.9–12.7)
WBC # BLD AUTO: 9.6 K/UL (ref 3.9–12.7)
WBC # BLD AUTO: 9.61 K/UL (ref 3.9–12.7)
WBC # BLD AUTO: 9.69 K/UL (ref 3.9–12.7)
WBC: 7.2 K/UL (ref 4.5–11)

## 2021-01-01 PROCEDURE — 85025 COMPLETE CBC W/AUTO DIFF WBC: CPT | Performed by: STUDENT IN AN ORGANIZED HEALTH CARE EDUCATION/TRAINING PROGRAM

## 2021-01-01 PROCEDURE — D9220A PRA ANESTHESIA: ICD-10-PCS | Mod: ANES,,, | Performed by: ANESTHESIOLOGY

## 2021-01-01 PROCEDURE — 25000003 PHARM REV CODE 250: Performed by: STUDENT IN AN ORGANIZED HEALTH CARE EDUCATION/TRAINING PROGRAM

## 2021-01-01 PROCEDURE — P9016 RBC LEUKOCYTES REDUCED: HCPCS | Performed by: INTERNAL MEDICINE

## 2021-01-01 PROCEDURE — 94761 N-INVAS EAR/PLS OXIMETRY MLT: CPT

## 2021-01-01 PROCEDURE — 99223 1ST HOSP IP/OBS HIGH 75: CPT | Mod: GC,,, | Performed by: SURGERY

## 2021-01-01 PROCEDURE — 25000003 PHARM REV CODE 250: Performed by: EMERGENCY MEDICINE

## 2021-01-01 PROCEDURE — 11000001 HC ACUTE MED/SURG PRIVATE ROOM

## 2021-01-01 PROCEDURE — 99233 SBSQ HOSP IP/OBS HIGH 50: CPT | Mod: GT,,, | Performed by: INTERNAL MEDICINE

## 2021-01-01 PROCEDURE — 84100 ASSAY OF PHOSPHORUS: CPT | Performed by: INTERNAL MEDICINE

## 2021-01-01 PROCEDURE — 80202 ASSAY OF VANCOMYCIN: CPT | Performed by: INTERNAL MEDICINE

## 2021-01-01 PROCEDURE — 80202 ASSAY OF VANCOMYCIN: CPT | Performed by: HOSPITALIST

## 2021-01-01 PROCEDURE — 27200750 HC INSULATED NEEDLE/ STIMUPLEX: Performed by: ANESTHESIOLOGY

## 2021-01-01 PROCEDURE — 64447 NJX AA&/STRD FEMORAL NRV IMG: CPT | Performed by: STUDENT IN AN ORGANIZED HEALTH CARE EDUCATION/TRAINING PROGRAM

## 2021-01-01 PROCEDURE — 80100016 HC MAINTENANCE HEMODIALYSIS

## 2021-01-01 PROCEDURE — 84145 PROCALCITONIN (PCT): CPT | Performed by: HOSPITALIST

## 2021-01-01 PROCEDURE — 25000003 PHARM REV CODE 250: Performed by: INTERNAL MEDICINE

## 2021-01-01 PROCEDURE — 63600175 PHARM REV CODE 636 W HCPCS: Performed by: NURSE ANESTHETIST, CERTIFIED REGISTERED

## 2021-01-01 PROCEDURE — 99232 SBSQ HOSP IP/OBS MODERATE 35: CPT | Mod: GT,,, | Performed by: INTERNAL MEDICINE

## 2021-01-01 PROCEDURE — 84484 ASSAY OF TROPONIN QUANT: CPT | Performed by: STUDENT IN AN ORGANIZED HEALTH CARE EDUCATION/TRAINING PROGRAM

## 2021-01-01 PROCEDURE — P9021 RED BLOOD CELLS UNIT: HCPCS | Performed by: STUDENT IN AN ORGANIZED HEALTH CARE EDUCATION/TRAINING PROGRAM

## 2021-01-01 PROCEDURE — 99223 1ST HOSP IP/OBS HIGH 75: CPT | Mod: ,,, | Performed by: INTERNAL MEDICINE

## 2021-01-01 PROCEDURE — 97165 OT EVAL LOW COMPLEX 30 MIN: CPT

## 2021-01-01 PROCEDURE — 97530 THERAPEUTIC ACTIVITIES: CPT | Mod: CQ

## 2021-01-01 PROCEDURE — 99231 PR SUBSEQUENT HOSPITAL CARE,LEVL I: ICD-10-PCS | Mod: GC,,, | Performed by: HOSPITALIST

## 2021-01-01 PROCEDURE — 99215 PR OFFICE/OUTPT VISIT, EST, LEVL V, 40-54 MIN: ICD-10-PCS | Mod: S$GLB,,, | Performed by: INTERNAL MEDICINE

## 2021-01-01 PROCEDURE — 99222 PR INITIAL HOSPITAL CARE,LEVL II: ICD-10-PCS | Mod: ,,, | Performed by: NURSE PRACTITIONER

## 2021-01-01 PROCEDURE — 86900 BLOOD TYPING SEROLOGIC ABO: CPT | Performed by: STUDENT IN AN ORGANIZED HEALTH CARE EDUCATION/TRAINING PROGRAM

## 2021-01-01 PROCEDURE — 99233 PR SUBSEQUENT HOSPITAL CARE,LEVL III: ICD-10-PCS | Mod: ,,, | Performed by: NURSE PRACTITIONER

## 2021-01-01 PROCEDURE — 97116 GAIT TRAINING THERAPY: CPT

## 2021-01-01 PROCEDURE — 99024 POSTOP FOLLOW-UP VISIT: CPT | Mod: S$GLB,,, | Performed by: PODIATRIST

## 2021-01-01 PROCEDURE — 90935 PR HEMODIALYSIS, ONE EVALUATION: ICD-10-PCS | Mod: ,,, | Performed by: NURSE PRACTITIONER

## 2021-01-01 PROCEDURE — 99233 SBSQ HOSP IP/OBS HIGH 50: CPT | Mod: ,,, | Performed by: INTERNAL MEDICINE

## 2021-01-01 PROCEDURE — 87176 TISSUE HOMOGENIZATION CULTR: CPT | Performed by: PODIATRIST

## 2021-01-01 PROCEDURE — 99900035 HC TECH TIME PER 15 MIN (STAT)

## 2021-01-01 PROCEDURE — C9399 UNCLASSIFIED DRUGS OR BIOLOG: HCPCS | Performed by: INTERNAL MEDICINE

## 2021-01-01 PROCEDURE — 63600175 PHARM REV CODE 636 W HCPCS: Performed by: STUDENT IN AN ORGANIZED HEALTH CARE EDUCATION/TRAINING PROGRAM

## 2021-01-01 PROCEDURE — 85014 HEMATOCRIT: CPT | Performed by: STUDENT IN AN ORGANIZED HEALTH CARE EDUCATION/TRAINING PROGRAM

## 2021-01-01 PROCEDURE — S0030 INJECTION, METRONIDAZOLE: HCPCS | Performed by: INTERNAL MEDICINE

## 2021-01-01 PROCEDURE — 99233 PR SUBSEQUENT HOSPITAL CARE,LEVL III: ICD-10-PCS | Mod: ,,, | Performed by: INTERNAL MEDICINE

## 2021-01-01 PROCEDURE — 94640 AIRWAY INHALATION TREATMENT: CPT

## 2021-01-01 PROCEDURE — 83735 ASSAY OF MAGNESIUM: CPT | Performed by: INTERNAL MEDICINE

## 2021-01-01 PROCEDURE — 90935 HEMODIALYSIS ONE EVALUATION: CPT | Mod: ,,, | Performed by: INTERNAL MEDICINE

## 2021-01-01 PROCEDURE — 99999 PR PBB SHADOW E&M-EST. PATIENT-LVL IV: CPT | Mod: PBBFAC,,, | Performed by: INTERNAL MEDICINE

## 2021-01-01 PROCEDURE — 96365 THER/PROPH/DIAG IV INF INIT: CPT

## 2021-01-01 PROCEDURE — C9399 UNCLASSIFIED DRUGS OR BIOLOG: HCPCS | Performed by: STUDENT IN AN ORGANIZED HEALTH CARE EDUCATION/TRAINING PROGRAM

## 2021-01-01 PROCEDURE — 27000221 HC OXYGEN, UP TO 24 HOURS

## 2021-01-01 PROCEDURE — 80053 COMPREHEN METABOLIC PANEL: CPT | Performed by: INTERNAL MEDICINE

## 2021-01-01 PROCEDURE — 99232 PR SUBSEQUENT HOSPITAL CARE,LEVL II: ICD-10-PCS | Mod: ,,, | Performed by: NURSE PRACTITIONER

## 2021-01-01 PROCEDURE — D9220A PRA ANESTHESIA: ICD-10-PCS | Mod: CRNA,,, | Performed by: NURSE ANESTHETIST, CERTIFIED REGISTERED

## 2021-01-01 PROCEDURE — 87075 CULTR BACTERIA EXCEPT BLOOD: CPT | Performed by: PODIATRIST

## 2021-01-01 PROCEDURE — 99024 POSTOP FOLLOW-UP VISIT: CPT | Mod: ,,, | Performed by: PODIATRIST

## 2021-01-01 PROCEDURE — 36415 COLL VENOUS BLD VENIPUNCTURE: CPT | Performed by: INTERNAL MEDICINE

## 2021-01-01 PROCEDURE — 97110 THERAPEUTIC EXERCISES: CPT

## 2021-01-01 PROCEDURE — 99223 1ST HOSP IP/OBS HIGH 75: CPT | Mod: AI,,, | Performed by: INTERNAL MEDICINE

## 2021-01-01 PROCEDURE — 97530 THERAPEUTIC ACTIVITIES: CPT

## 2021-01-01 PROCEDURE — 99223 PR INITIAL HOSPITAL CARE,LEVL III: ICD-10-PCS | Mod: ,,, | Performed by: INTERNAL MEDICINE

## 2021-01-01 PROCEDURE — 97530 THERAPEUTIC ACTIVITIES: CPT | Mod: CO

## 2021-01-01 PROCEDURE — 36000707: Performed by: SURGERY

## 2021-01-01 PROCEDURE — 99024 PR POST-OP FOLLOW-UP VISIT: ICD-10-PCS | Mod: S$GLB,,, | Performed by: PODIATRIST

## 2021-01-01 PROCEDURE — 99233 SBSQ HOSP IP/OBS HIGH 50: CPT | Mod: ,,, | Performed by: PHYSICIAN ASSISTANT

## 2021-01-01 PROCEDURE — 63600175 PHARM REV CODE 636 W HCPCS: Performed by: INTERNAL MEDICINE

## 2021-01-01 PROCEDURE — 99999 PR PBB SHADOW E&M-EST. PATIENT-LVL IV: ICD-10-PCS | Mod: PBBFAC,,, | Performed by: PODIATRIST

## 2021-01-01 PROCEDURE — 99233 SBSQ HOSP IP/OBS HIGH 50: CPT | Mod: 24,,, | Performed by: PODIATRIST

## 2021-01-01 PROCEDURE — 63600175 PHARM REV CODE 636 W HCPCS: Performed by: HOSPITALIST

## 2021-01-01 PROCEDURE — 63600175 PHARM REV CODE 636 W HCPCS: Mod: TB | Performed by: STUDENT IN AN ORGANIZED HEALTH CARE EDUCATION/TRAINING PROGRAM

## 2021-01-01 PROCEDURE — U0003 INFECTIOUS AGENT DETECTION BY NUCLEIC ACID (DNA OR RNA); SEVERE ACUTE RESPIRATORY SYNDROME CORONAVIRUS 2 (SARS-COV-2) (CORONAVIRUS DISEASE [COVID-19]), AMPLIFIED PROBE TECHNIQUE, MAKING USE OF HIGH THROUGHPUT TECHNOLOGIES AS DESCRIBED BY CMS-2020-01-R: HCPCS | Performed by: PHYSICIAN ASSISTANT

## 2021-01-01 PROCEDURE — 85025 COMPLETE CBC W/AUTO DIFF WBC: CPT | Performed by: INTERNAL MEDICINE

## 2021-01-01 PROCEDURE — 80069 RENAL FUNCTION PANEL: CPT | Performed by: INTERNAL MEDICINE

## 2021-01-01 PROCEDURE — 99233 PR SUBSEQUENT HOSPITAL CARE,LEVL III: ICD-10-PCS | Mod: GT,,, | Performed by: INTERNAL MEDICINE

## 2021-01-01 PROCEDURE — C1769 GUIDE WIRE: HCPCS | Performed by: SURGERY

## 2021-01-01 PROCEDURE — 84100 ASSAY OF PHOSPHORUS: CPT | Performed by: STUDENT IN AN ORGANIZED HEALTH CARE EDUCATION/TRAINING PROGRAM

## 2021-01-01 PROCEDURE — 86704 HEP B CORE ANTIBODY TOTAL: CPT | Performed by: EMERGENCY MEDICINE

## 2021-01-01 PROCEDURE — 80048 BASIC METABOLIC PNL TOTAL CA: CPT | Performed by: STUDENT IN AN ORGANIZED HEALTH CARE EDUCATION/TRAINING PROGRAM

## 2021-01-01 PROCEDURE — 99024 PR POST-OP FOLLOW-UP VISIT: ICD-10-PCS | Mod: ,,, | Performed by: PODIATRIST

## 2021-01-01 PROCEDURE — 99233 SBSQ HOSP IP/OBS HIGH 50: CPT | Mod: ,,, | Performed by: NURSE PRACTITIONER

## 2021-01-01 PROCEDURE — 99232 PR SUBSEQUENT HOSPITAL CARE,LEVL II: ICD-10-PCS | Mod: ,,, | Performed by: INTERNAL MEDICINE

## 2021-01-01 PROCEDURE — 99239 HOSP IP/OBS DSCHRG MGMT >30: CPT | Mod: GC,,, | Performed by: STUDENT IN AN ORGANIZED HEALTH CARE EDUCATION/TRAINING PROGRAM

## 2021-01-01 PROCEDURE — 93010 ELECTROCARDIOGRAM REPORT: CPT | Mod: ,,, | Performed by: INTERNAL MEDICINE

## 2021-01-01 PROCEDURE — 93005 ELECTROCARDIOGRAM TRACING: CPT

## 2021-01-01 PROCEDURE — 83735 ASSAY OF MAGNESIUM: CPT | Performed by: STUDENT IN AN ORGANIZED HEALTH CARE EDUCATION/TRAINING PROGRAM

## 2021-01-01 PROCEDURE — 25000242 PHARM REV CODE 250 ALT 637 W/ HCPCS: Performed by: PHYSICIAN ASSISTANT

## 2021-01-01 PROCEDURE — 99233 SBSQ HOSP IP/OBS HIGH 50: CPT | Mod: 95,,, | Performed by: INTERNAL MEDICINE

## 2021-01-01 PROCEDURE — 87632 RESP VIRUS 6-11 TARGETS: CPT | Performed by: HOSPITALIST

## 2021-01-01 PROCEDURE — 85018 HEMOGLOBIN: CPT | Performed by: STUDENT IN AN ORGANIZED HEALTH CARE EDUCATION/TRAINING PROGRAM

## 2021-01-01 PROCEDURE — 77061 BREAST TOMOSYNTHESIS UNI: CPT | Mod: 26,RT,, | Performed by: RADIOLOGY

## 2021-01-01 PROCEDURE — 99999 PR PBB SHADOW E&M-EST. PATIENT-LVL III: CPT | Mod: PBBFAC,,, | Performed by: PODIATRIST

## 2021-01-01 PROCEDURE — 29580 STRAPPING UNNA BOOT: CPT | Mod: 58,LT,S$GLB, | Performed by: PODIATRIST

## 2021-01-01 PROCEDURE — 25000003 PHARM REV CODE 250: Performed by: HOSPITALIST

## 2021-01-01 PROCEDURE — 84100 ASSAY OF PHOSPHORUS: CPT | Performed by: PHYSICIAN ASSISTANT

## 2021-01-01 PROCEDURE — 99223 1ST HOSP IP/OBS HIGH 75: CPT | Mod: ,,, | Performed by: HOSPITALIST

## 2021-01-01 PROCEDURE — D9220A PRA ANESTHESIA: Mod: ANES,,, | Performed by: ANESTHESIOLOGY

## 2021-01-01 PROCEDURE — 75710 PR  ANGIO EXTREMITY UNILAT: ICD-10-PCS | Mod: 26,,, | Performed by: SURGERY

## 2021-01-01 PROCEDURE — U0002 COVID-19 LAB TEST NON-CDC: HCPCS | Performed by: EMERGENCY MEDICINE

## 2021-01-01 PROCEDURE — 99232 SBSQ HOSP IP/OBS MODERATE 35: CPT | Mod: ,,, | Performed by: INTERNAL MEDICINE

## 2021-01-01 PROCEDURE — 99232 SBSQ HOSP IP/OBS MODERATE 35: CPT | Mod: ,,, | Performed by: NURSE PRACTITIONER

## 2021-01-01 PROCEDURE — 83615 LACTATE (LD) (LDH) ENZYME: CPT | Performed by: HOSPITALIST

## 2021-01-01 PROCEDURE — 29580 PR STRAPPING UNNA BOOT: ICD-10-PCS | Mod: 58,LT,S$GLB, | Performed by: PODIATRIST

## 2021-01-01 PROCEDURE — 99231 SBSQ HOSP IP/OBS SF/LOW 25: CPT | Mod: GC,,, | Performed by: HOSPITALIST

## 2021-01-01 PROCEDURE — 63600175 PHARM REV CODE 636 W HCPCS: Performed by: PODIATRIST

## 2021-01-01 PROCEDURE — 82962 GLUCOSE BLOOD TEST: CPT

## 2021-01-01 PROCEDURE — 99233 SBSQ HOSP IP/OBS HIGH 50: CPT | Mod: GC,,, | Performed by: HOSPITALIST

## 2021-01-01 PROCEDURE — 36246 INS CATH ABD/L-EXT ART 2ND: CPT | Mod: 79,LT,, | Performed by: SURGERY

## 2021-01-01 PROCEDURE — 97535 SELF CARE MNGMENT TRAINING: CPT

## 2021-01-01 PROCEDURE — A4216 STERILE WATER/SALINE, 10 ML: HCPCS | Performed by: PHYSICIAN ASSISTANT

## 2021-01-01 PROCEDURE — 73140 X-RAY EXAM OF FINGER(S): CPT | Mod: TC,PO,RT

## 2021-01-01 PROCEDURE — 99291 CRITICAL CARE FIRST HOUR: CPT | Mod: ,,, | Performed by: PHYSICIAN ASSISTANT

## 2021-01-01 PROCEDURE — 36415 COLL VENOUS BLD VENIPUNCTURE: CPT | Performed by: STUDENT IN AN ORGANIZED HEALTH CARE EDUCATION/TRAINING PROGRAM

## 2021-01-01 PROCEDURE — 88311 DECALCIFY TISSUE: CPT | Mod: 26,,, | Performed by: PATHOLOGY

## 2021-01-01 PROCEDURE — 99285 PR EMERGENCY DEPT VISIT,LEVEL V: ICD-10-PCS | Mod: ,,, | Performed by: EMERGENCY MEDICINE

## 2021-01-01 PROCEDURE — 93926 LOWER EXTREMITY STUDY: CPT | Mod: 26,,, | Performed by: SURGERY

## 2021-01-01 PROCEDURE — 99232 PR SUBSEQUENT HOSPITAL CARE,LEVL II: ICD-10-PCS | Mod: GT,,, | Performed by: INTERNAL MEDICINE

## 2021-01-01 PROCEDURE — 96372 THER/PROPH/DIAG INJ SC/IM: CPT | Mod: 59

## 2021-01-01 PROCEDURE — 87070 CULTURE OTHR SPECIMN AEROBIC: CPT | Performed by: STUDENT IN AN ORGANIZED HEALTH CARE EDUCATION/TRAINING PROGRAM

## 2021-01-01 PROCEDURE — U0003 INFECTIOUS AGENT DETECTION BY NUCLEIC ACID (DNA OR RNA); SEVERE ACUTE RESPIRATORY SYNDROME CORONAVIRUS 2 (SARS-COV-2) (CORONAVIRUS DISEASE [COVID-19]), AMPLIFIED PROBE TECHNIQUE, MAKING USE OF HIGH THROUGHPUT TECHNOLOGIES AS DESCRIBED BY CMS-2020-01-R: HCPCS | Performed by: INTERNAL MEDICINE

## 2021-01-01 PROCEDURE — 99285 PR EMERGENCY DEPT VISIT,LEVEL V: ICD-10-PCS | Mod: CS,,, | Performed by: EMERGENCY MEDICINE

## 2021-01-01 PROCEDURE — 99239 HOSP IP/OBS DSCHRG MGMT >30: CPT | Mod: GT,,, | Performed by: INTERNAL MEDICINE

## 2021-01-01 PROCEDURE — 85610 PROTHROMBIN TIME: CPT | Performed by: STUDENT IN AN ORGANIZED HEALTH CARE EDUCATION/TRAINING PROGRAM

## 2021-01-01 PROCEDURE — 99233 PR SUBSEQUENT HOSPITAL CARE,LEVL III: ICD-10-PCS | Mod: GC,,, | Performed by: HOSPITALIST

## 2021-01-01 PROCEDURE — 85652 RBC SED RATE AUTOMATED: CPT | Performed by: INTERNAL MEDICINE

## 2021-01-01 PROCEDURE — 20220 PR BONE BIOPSY,TROCAR/NEEDLE SUPERF: ICD-10-PCS | Mod: 58,S$GLB,, | Performed by: PODIATRIST

## 2021-01-01 PROCEDURE — 25000003 PHARM REV CODE 250: Performed by: NURSE ANESTHETIST, CERTIFIED REGISTERED

## 2021-01-01 PROCEDURE — 20000000 HC ICU ROOM

## 2021-01-01 PROCEDURE — 85379 FIBRIN DEGRADATION QUANT: CPT | Performed by: EMERGENCY MEDICINE

## 2021-01-01 PROCEDURE — 36000706: Performed by: PODIATRIST

## 2021-01-01 PROCEDURE — 76642 ULTRASOUND BREAST LIMITED: CPT | Mod: TC,RT

## 2021-01-01 PROCEDURE — 25000242 PHARM REV CODE 250 ALT 637 W/ HCPCS: Performed by: INTERNAL MEDICINE

## 2021-01-01 PROCEDURE — 88305 TISSUE EXAM BY PATHOLOGIST: CPT | Performed by: PATHOLOGY

## 2021-01-01 PROCEDURE — 96366 THER/PROPH/DIAG IV INF ADDON: CPT

## 2021-01-01 PROCEDURE — 88307 TISSUE EXAM BY PATHOLOGIST: CPT | Performed by: PATHOLOGY

## 2021-01-01 PROCEDURE — U0005 INFEC AGEN DETEC AMPLI PROBE: HCPCS | Performed by: STUDENT IN AN ORGANIZED HEALTH CARE EDUCATION/TRAINING PROGRAM

## 2021-01-01 PROCEDURE — 25000242 PHARM REV CODE 250 ALT 637 W/ HCPCS: Performed by: STUDENT IN AN ORGANIZED HEALTH CARE EDUCATION/TRAINING PROGRAM

## 2021-01-01 PROCEDURE — 93926 LOWER EXTREMITY STUDY: CPT | Performed by: SURGERY

## 2021-01-01 PROCEDURE — 90935 HEMODIALYSIS ONE EVALUATION: CPT | Mod: ,,, | Performed by: NURSE PRACTITIONER

## 2021-01-01 PROCEDURE — 87040 BLOOD CULTURE FOR BACTERIA: CPT | Performed by: STUDENT IN AN ORGANIZED HEALTH CARE EDUCATION/TRAINING PROGRAM

## 2021-01-01 PROCEDURE — 71000015 HC POSTOP RECOV 1ST HR: Performed by: PODIATRIST

## 2021-01-01 PROCEDURE — C1887 CATHETER, GUIDING: HCPCS | Performed by: SURGERY

## 2021-01-01 PROCEDURE — 85025 COMPLETE CBC W/AUTO DIFF WBC: CPT | Performed by: PHYSICIAN ASSISTANT

## 2021-01-01 PROCEDURE — 82803 BLOOD GASES ANY COMBINATION: CPT

## 2021-01-01 PROCEDURE — 94660 CPAP INITIATION&MGMT: CPT

## 2021-01-01 PROCEDURE — 27000646 HC AEROBIKA DEVICE

## 2021-01-01 PROCEDURE — 80100014 HC HEMODIALYSIS 1:1

## 2021-01-01 PROCEDURE — 90935 PR HEMODIALYSIS, ONE EVALUATION: ICD-10-PCS | Mod: ,,, | Performed by: INTERNAL MEDICINE

## 2021-01-01 PROCEDURE — U0005 INFEC AGEN DETEC AMPLI PROBE: HCPCS | Performed by: PHYSICIAN ASSISTANT

## 2021-01-01 PROCEDURE — 82728 ASSAY OF FERRITIN: CPT | Performed by: INTERNAL MEDICINE

## 2021-01-01 PROCEDURE — 82962 GLUCOSE BLOOD TEST: CPT | Performed by: SURGERY

## 2021-01-01 PROCEDURE — 36000707: Performed by: PODIATRIST

## 2021-01-01 PROCEDURE — 99238 HOSP IP/OBS DSCHRG MGMT 30/<: CPT | Mod: ,,, | Performed by: INTERNAL MEDICINE

## 2021-01-01 PROCEDURE — 85027 COMPLETE CBC AUTOMATED: CPT | Performed by: INTERNAL MEDICINE

## 2021-01-01 PROCEDURE — 63600175 PHARM REV CODE 636 W HCPCS: Performed by: PHYSICIAN ASSISTANT

## 2021-01-01 PROCEDURE — 93922 UPR/L XTREMITY ART 2 LEVELS: CPT | Performed by: SURGERY

## 2021-01-01 PROCEDURE — 99999 PR PBB SHADOW E&M-EST. PATIENT-LVL III: CPT | Mod: PBBFAC,,, | Performed by: NURSE PRACTITIONER

## 2021-01-01 PROCEDURE — 99232 PR SUBSEQUENT HOSPITAL CARE,LEVL II: ICD-10-PCS | Mod: GC,,, | Performed by: HOSPITALIST

## 2021-01-01 PROCEDURE — 36415 COLL VENOUS BLD VENIPUNCTURE: CPT

## 2021-01-01 PROCEDURE — 99024 PR POST-OP FOLLOW-UP VISIT: ICD-10-PCS | Mod: ,,, | Performed by: SURGERY

## 2021-01-01 PROCEDURE — 93926 PR DUPLEX LO EXTREM ART UNILAT/LTD: ICD-10-PCS | Mod: 26,,, | Performed by: SURGERY

## 2021-01-01 PROCEDURE — 80053 COMPREHEN METABOLIC PANEL: CPT | Performed by: STUDENT IN AN ORGANIZED HEALTH CARE EDUCATION/TRAINING PROGRAM

## 2021-01-01 PROCEDURE — 82728 ASSAY OF FERRITIN: CPT | Performed by: HOSPITALIST

## 2021-01-01 PROCEDURE — 80053 COMPREHEN METABOLIC PANEL: CPT | Performed by: EMERGENCY MEDICINE

## 2021-01-01 PROCEDURE — 93922 UPR/L XTREMITY ART 2 LEVELS: CPT | Mod: 26,,, | Performed by: SURGERY

## 2021-01-01 PROCEDURE — U0003 INFECTIOUS AGENT DETECTION BY NUCLEIC ACID (DNA OR RNA); SEVERE ACUTE RESPIRATORY SYNDROME CORONAVIRUS 2 (SARS-COV-2) (CORONAVIRUS DISEASE [COVID-19]), AMPLIFIED PROBE TECHNIQUE, MAKING USE OF HIGH THROUGHPUT TECHNOLOGIES AS DESCRIBED BY CMS-2020-01-R: HCPCS | Performed by: HOSPITALIST

## 2021-01-01 PROCEDURE — 21400001 HC TELEMETRY ROOM

## 2021-01-01 PROCEDURE — 85018 HEMOGLOBIN: CPT | Performed by: INTERNAL MEDICINE

## 2021-01-01 PROCEDURE — 80074 ACUTE HEPATITIS PANEL: CPT | Performed by: INTERNAL MEDICINE

## 2021-01-01 PROCEDURE — 85025 COMPLETE CBC W/AUTO DIFF WBC: CPT | Performed by: EMERGENCY MEDICINE

## 2021-01-01 PROCEDURE — U0005 INFEC AGEN DETEC AMPLI PROBE: HCPCS | Performed by: INTERNAL MEDICINE

## 2021-01-01 PROCEDURE — 77061 MAMMO DIGITAL DIAGNOSTIC RIGHT WITH TOMO: ICD-10-PCS | Mod: 26,RT,, | Performed by: RADIOLOGY

## 2021-01-01 PROCEDURE — 96376 TX/PRO/DX INJ SAME DRUG ADON: CPT

## 2021-01-01 PROCEDURE — 28805 PR AMPUTATION FOOT,TRANSMETATARSAL: ICD-10-PCS | Mod: 51,LT,, | Performed by: PODIATRIST

## 2021-01-01 PROCEDURE — 64450 PR NERVE BLOCK INJ, ANES/STEROID, OTHER PERIPHERAL: ICD-10-PCS | Mod: 59,LT,, | Performed by: ANESTHESIOLOGY

## 2021-01-01 PROCEDURE — 99238 PR HOSPITAL DISCHARGE DAY,<30 MIN: ICD-10-PCS | Mod: GC,,, | Performed by: HOSPITALIST

## 2021-01-01 PROCEDURE — 25000003 PHARM REV CODE 250: Performed by: NURSE PRACTITIONER

## 2021-01-01 PROCEDURE — 73140 X-RAY EXAM OF FINGER(S): CPT | Mod: 26,RT,, | Performed by: RADIOLOGY

## 2021-01-01 PROCEDURE — C1894 INTRO/SHEATH, NON-LASER: HCPCS | Performed by: SURGERY

## 2021-01-01 PROCEDURE — 27201423 OPTIME MED/SURG SUP & DEVICES STERILE SUPPLY: Performed by: SURGERY

## 2021-01-01 PROCEDURE — 87205 SMEAR GRAM STAIN: CPT | Performed by: INTERNAL MEDICINE

## 2021-01-01 PROCEDURE — 27201423 OPTIME MED/SURG SUP & DEVICES STERILE SUPPLY: Performed by: PODIATRIST

## 2021-01-01 PROCEDURE — 97162 PT EVAL MOD COMPLEX 30 MIN: CPT

## 2021-01-01 PROCEDURE — C1760 CLOSURE DEV, VASC: HCPCS | Performed by: SURGERY

## 2021-01-01 PROCEDURE — 37226 PR FEM/POPL REVASC W/STENT: ICD-10-PCS | Mod: LT,,, | Performed by: SURGERY

## 2021-01-01 PROCEDURE — 14041 TIS TRNFR F/C/C/M/N/A/G/H/F: CPT | Mod: ,,, | Performed by: PODIATRIST

## 2021-01-01 PROCEDURE — 86920 COMPATIBILITY TEST SPIN: CPT | Performed by: STUDENT IN AN ORGANIZED HEALTH CARE EDUCATION/TRAINING PROGRAM

## 2021-01-01 PROCEDURE — 92610 EVALUATE SWALLOWING FUNCTION: CPT

## 2021-01-01 PROCEDURE — 99999 PR PBB SHADOW E&M-EST. PATIENT-LVL III: ICD-10-PCS | Mod: PBBFAC,,, | Performed by: NURSE PRACTITIONER

## 2021-01-01 PROCEDURE — 25000003 PHARM REV CODE 250: Performed by: PHYSICIAN ASSISTANT

## 2021-01-01 PROCEDURE — 71000044 HC DOSC ROUTINE RECOVERY FIRST HOUR: Performed by: SURGERY

## 2021-01-01 PROCEDURE — U0003 INFECTIOUS AGENT DETECTION BY NUCLEIC ACID (DNA OR RNA); SEVERE ACUTE RESPIRATORY SYNDROME CORONAVIRUS 2 (SARS-COV-2) (CORONAVIRUS DISEASE [COVID-19]), AMPLIFIED PROBE TECHNIQUE, MAKING USE OF HIGH THROUGHPUT TECHNOLOGIES AS DESCRIBED BY CMS-2020-01-R: HCPCS | Performed by: STUDENT IN AN ORGANIZED HEALTH CARE EDUCATION/TRAINING PROGRAM

## 2021-01-01 PROCEDURE — 99232 SBSQ HOSP IP/OBS MODERATE 35: CPT | Mod: ,,, | Performed by: HOSPITALIST

## 2021-01-01 PROCEDURE — 93010 EKG 12-LEAD: ICD-10-PCS | Mod: ,,, | Performed by: INTERNAL MEDICINE

## 2021-01-01 PROCEDURE — 97161 PT EVAL LOW COMPLEX 20 MIN: CPT

## 2021-01-01 PROCEDURE — 36415 COLL VENOUS BLD VENIPUNCTURE: CPT | Performed by: PHYSICIAN ASSISTANT

## 2021-01-01 PROCEDURE — 84484 ASSAY OF TROPONIN QUANT: CPT | Performed by: EMERGENCY MEDICINE

## 2021-01-01 PROCEDURE — 20600001 HC STEP DOWN PRIVATE ROOM

## 2021-01-01 PROCEDURE — U0002 COVID-19 LAB TEST NON-CDC: HCPCS | Performed by: STUDENT IN AN ORGANIZED HEALTH CARE EDUCATION/TRAINING PROGRAM

## 2021-01-01 PROCEDURE — 36600 WITHDRAWAL OF ARTERIAL BLOOD: CPT

## 2021-01-01 PROCEDURE — 88311 PR  DECALCIFY TISSUE: ICD-10-PCS | Mod: 26,,, | Performed by: PATHOLOGY

## 2021-01-01 PROCEDURE — 99223 PR INITIAL HOSPITAL CARE,LEVL III: ICD-10-PCS | Mod: AI,,, | Performed by: INTERNAL MEDICINE

## 2021-01-01 PROCEDURE — 88307 PR  SURG PATH,LEVEL V: ICD-10-PCS | Mod: 26,,, | Performed by: PATHOLOGY

## 2021-01-01 PROCEDURE — D9220A PRA ANESTHESIA: Mod: CRNA,,, | Performed by: NURSE ANESTHETIST, CERTIFIED REGISTERED

## 2021-01-01 PROCEDURE — 87651 STREP A DNA AMP PROBE: CPT | Performed by: HOSPITALIST

## 2021-01-01 PROCEDURE — 80053 COMPREHEN METABOLIC PANEL: CPT

## 2021-01-01 PROCEDURE — 99285 EMERGENCY DEPT VISIT HI MDM: CPT | Mod: 25

## 2021-01-01 PROCEDURE — 99999 PR PBB SHADOW E&M-EST. PATIENT-LVL IV: ICD-10-PCS | Mod: PBBFAC,,, | Performed by: INTERNAL MEDICINE

## 2021-01-01 PROCEDURE — C1725 CATH, TRANSLUMIN NON-LASER: HCPCS | Performed by: SURGERY

## 2021-01-01 PROCEDURE — 83735 ASSAY OF MAGNESIUM: CPT | Performed by: EMERGENCY MEDICINE

## 2021-01-01 PROCEDURE — 88305 TISSUE EXAM BY PATHOLOGIST: ICD-10-PCS | Mod: 26,,, | Performed by: STUDENT IN AN ORGANIZED HEALTH CARE EDUCATION/TRAINING PROGRAM

## 2021-01-01 PROCEDURE — 85025 COMPLETE CBC W/AUTO DIFF WBC: CPT | Mod: 91 | Performed by: STUDENT IN AN ORGANIZED HEALTH CARE EDUCATION/TRAINING PROGRAM

## 2021-01-01 PROCEDURE — 88307 TISSUE EXAM BY PATHOLOGIST: CPT | Mod: 26,,, | Performed by: PATHOLOGY

## 2021-01-01 PROCEDURE — 86140 C-REACTIVE PROTEIN: CPT | Performed by: HOSPITALIST

## 2021-01-01 PROCEDURE — 99239 HOSP IP/OBS DSCHRG MGMT >30: CPT | Mod: ,,, | Performed by: INTERNAL MEDICINE

## 2021-01-01 PROCEDURE — 37000008 HC ANESTHESIA 1ST 15 MINUTES: Performed by: PODIATRIST

## 2021-01-01 PROCEDURE — 27100098 HC SPACER

## 2021-01-01 PROCEDURE — 82962 GLUCOSE BLOOD TEST: CPT | Performed by: PODIATRIST

## 2021-01-01 PROCEDURE — 99999 PR PBB SHADOW E&M-EST. PATIENT-LVL V: ICD-10-PCS | Mod: PBBFAC,GC,, | Performed by: INTERNAL MEDICINE

## 2021-01-01 PROCEDURE — 87186 SC STD MICRODIL/AGAR DIL: CPT | Performed by: PODIATRIST

## 2021-01-01 PROCEDURE — 75710 ARTERY X-RAYS ARM/LEG: CPT | Mod: 26,,, | Performed by: SURGERY

## 2021-01-01 PROCEDURE — 36415 COLL VENOUS BLD VENIPUNCTURE: CPT | Performed by: HOSPITALIST

## 2021-01-01 PROCEDURE — 71000033 HC RECOVERY, INTIAL HOUR: Performed by: PODIATRIST

## 2021-01-01 PROCEDURE — 93922 PR NON-INVAS PHYSIOLOGIC STD EXTREMITY ART 1-2 LEVEL: ICD-10-PCS | Mod: 26,,, | Performed by: SURGERY

## 2021-01-01 PROCEDURE — 99495 TCM SERVICES (MODERATE COMPLEXITY): ICD-10-PCS | Mod: 95,,, | Performed by: INTERNAL MEDICINE

## 2021-01-01 PROCEDURE — 25000003 PHARM REV CODE 250

## 2021-01-01 PROCEDURE — 25000242 PHARM REV CODE 250 ALT 637 W/ HCPCS

## 2021-01-01 PROCEDURE — 99233 PR SUBSEQUENT HOSPITAL CARE,LEVL III: ICD-10-PCS | Mod: ,,, | Performed by: PHYSICIAN ASSISTANT

## 2021-01-01 PROCEDURE — 83880 ASSAY OF NATRIURETIC PEPTIDE: CPT | Performed by: EMERGENCY MEDICINE

## 2021-01-01 PROCEDURE — 37000009 HC ANESTHESIA EA ADD 15 MINS: Performed by: PODIATRIST

## 2021-01-01 PROCEDURE — 25500020 PHARM REV CODE 255: Performed by: STUDENT IN AN ORGANIZED HEALTH CARE EDUCATION/TRAINING PROGRAM

## 2021-01-01 PROCEDURE — 63600175 PHARM REV CODE 636 W HCPCS: Performed by: EMERGENCY MEDICINE

## 2021-01-01 PROCEDURE — 25000003 PHARM REV CODE 250: Performed by: PODIATRIST

## 2021-01-01 PROCEDURE — 77065 DX MAMMO INCL CAD UNI: CPT | Mod: 26,RT,, | Performed by: RADIOLOGY

## 2021-01-01 PROCEDURE — 87040 BLOOD CULTURE FOR BACTERIA: CPT | Mod: 59 | Performed by: PHYSICIAN ASSISTANT

## 2021-01-01 PROCEDURE — 76942 PERIPHERAL BLOCK: ICD-10-PCS | Mod: 26,,, | Performed by: ANESTHESIOLOGY

## 2021-01-01 PROCEDURE — 88311 DECALCIFY TISSUE: CPT | Performed by: PATHOLOGY

## 2021-01-01 PROCEDURE — 99214 OFFICE O/P EST MOD 30 MIN: CPT | Mod: S$GLB,,, | Performed by: NURSE PRACTITIONER

## 2021-01-01 PROCEDURE — 63600175 PHARM REV CODE 636 W HCPCS: Mod: TB | Performed by: INTERNAL MEDICINE

## 2021-01-01 PROCEDURE — 63600175 PHARM REV CODE 636 W HCPCS

## 2021-01-01 PROCEDURE — 99223 PR INITIAL HOSPITAL CARE,LEVL III: ICD-10-PCS | Mod: ,,, | Performed by: HOSPITALIST

## 2021-01-01 PROCEDURE — 99213 OFFICE O/P EST LOW 20 MIN: CPT | Mod: S$GLB,,, | Performed by: PHYSICIAN ASSISTANT

## 2021-01-01 PROCEDURE — 88305 TISSUE EXAM BY PATHOLOGIST: CPT | Mod: 26,,, | Performed by: PATHOLOGY

## 2021-01-01 PROCEDURE — 99291 PR CRITICAL CARE, E/M 30-74 MINUTES: ICD-10-PCS | Mod: ,,, | Performed by: PHYSICIAN ASSISTANT

## 2021-01-01 PROCEDURE — 86900 BLOOD TYPING SEROLOGIC ABO: CPT | Performed by: INTERNAL MEDICINE

## 2021-01-01 PROCEDURE — 71000016 HC POSTOP RECOV ADDL HR: Performed by: PODIATRIST

## 2021-01-01 PROCEDURE — 75710 ARTERY X-RAYS ARM/LEG: CPT | Mod: 26,59,, | Performed by: SURGERY

## 2021-01-01 PROCEDURE — 99214 OFFICE O/P EST MOD 30 MIN: CPT | Mod: S$GLB,,, | Performed by: INTERNAL MEDICINE

## 2021-01-01 PROCEDURE — 96374 THER/PROPH/DIAG INJ IV PUSH: CPT

## 2021-01-01 PROCEDURE — 97116 GAIT TRAINING THERAPY: CPT | Mod: CQ

## 2021-01-01 PROCEDURE — 85025 COMPLETE CBC W/AUTO DIFF WBC: CPT

## 2021-01-01 PROCEDURE — 99232 SBSQ HOSP IP/OBS MODERATE 35: CPT | Mod: GC,,, | Performed by: HOSPITALIST

## 2021-01-01 PROCEDURE — 99233 PR SUBSEQUENT HOSPITAL CARE,LEVL III: ICD-10-PCS | Mod: 24,,, | Performed by: PODIATRIST

## 2021-01-01 PROCEDURE — 87070 CULTURE OTHR SPECIMN AEROBIC: CPT | Performed by: PODIATRIST

## 2021-01-01 PROCEDURE — 76942 ECHO GUIDE FOR BIOPSY: CPT | Performed by: STUDENT IN AN ORGANIZED HEALTH CARE EDUCATION/TRAINING PROGRAM

## 2021-01-01 PROCEDURE — 99233 PR SUBSEQUENT HOSPITAL CARE,LEVL III: ICD-10-PCS | Mod: ,,, | Performed by: PODIATRIST

## 2021-01-01 PROCEDURE — 80053 COMPREHEN METABOLIC PANEL: CPT | Performed by: PHYSICIAN ASSISTANT

## 2021-01-01 PROCEDURE — 77061 BREAST TOMOSYNTHESIS UNI: CPT | Mod: TC,RT

## 2021-01-01 PROCEDURE — 83605 ASSAY OF LACTIC ACID: CPT | Performed by: EMERGENCY MEDICINE

## 2021-01-01 PROCEDURE — 83605 ASSAY OF LACTIC ACID: CPT | Performed by: STUDENT IN AN ORGANIZED HEALTH CARE EDUCATION/TRAINING PROGRAM

## 2021-01-01 PROCEDURE — 27000190 HC CPAP FULL FACE MASK W/VALVE

## 2021-01-01 PROCEDURE — 76642 US BREAST RIGHT LIMITED: ICD-10-PCS | Mod: 26,RT,, | Performed by: RADIOLOGY

## 2021-01-01 PROCEDURE — 99999 PR PBB SHADOW E&M-EST. PATIENT-LVL IV: ICD-10-PCS | Mod: PBBFAC,,, | Performed by: STUDENT IN AN ORGANIZED HEALTH CARE EDUCATION/TRAINING PROGRAM

## 2021-01-01 PROCEDURE — 25500020 PHARM REV CODE 255: Performed by: SURGERY

## 2021-01-01 PROCEDURE — 99223 1ST HOSP IP/OBS HIGH 75: CPT | Mod: AI,GC,, | Performed by: HOSPITALIST

## 2021-01-01 PROCEDURE — 87186 SC STD MICRODIL/AGAR DIL: CPT | Mod: 59 | Performed by: PODIATRIST

## 2021-01-01 PROCEDURE — 80048 BASIC METABOLIC PNL TOTAL CA: CPT | Performed by: INTERNAL MEDICINE

## 2021-01-01 PROCEDURE — 99999 PR PBB SHADOW E&M-EST. PATIENT-LVL IV: CPT | Mod: PBBFAC,,, | Performed by: PODIATRIST

## 2021-01-01 PROCEDURE — 28810 PR AMPUTATION METATARSAL+TOE,SINGLE: ICD-10-PCS | Mod: 51,T1,, | Performed by: SURGERY

## 2021-01-01 PROCEDURE — 83735 ASSAY OF MAGNESIUM: CPT | Performed by: PHYSICIAN ASSISTANT

## 2021-01-01 PROCEDURE — 99223 PR INITIAL HOSPITAL CARE,LEVL III: ICD-10-PCS | Mod: ,,, | Performed by: PODIATRIST

## 2021-01-01 PROCEDURE — 80048 BASIC METABOLIC PNL TOTAL CA: CPT | Performed by: PHYSICIAN ASSISTANT

## 2021-01-01 PROCEDURE — 80076 HEPATIC FUNCTION PANEL: CPT | Performed by: INTERNAL MEDICINE

## 2021-01-01 PROCEDURE — 99214 OFFICE O/P EST MOD 30 MIN: CPT | Mod: GC,S$GLB,, | Performed by: INTERNAL MEDICINE

## 2021-01-01 PROCEDURE — 80048 BASIC METABOLIC PNL TOTAL CA: CPT | Performed by: NURSE PRACTITIONER

## 2021-01-01 PROCEDURE — 99284 EMERGENCY DEPT VISIT MOD MDM: CPT | Mod: CS,,, | Performed by: EMERGENCY MEDICINE

## 2021-01-01 PROCEDURE — 25000242 PHARM REV CODE 250 ALT 637 W/ HCPCS: Performed by: HOSPITALIST

## 2021-01-01 PROCEDURE — 99214 PR OFFICE/OUTPT VISIT, EST, LEVL IV, 30-39 MIN: ICD-10-PCS | Mod: S$GLB,,, | Performed by: NURSE PRACTITIONER

## 2021-01-01 PROCEDURE — 84100 ASSAY OF PHOSPHORUS: CPT

## 2021-01-01 PROCEDURE — 99222 1ST HOSP IP/OBS MODERATE 55: CPT | Mod: ,,, | Performed by: NURSE PRACTITIONER

## 2021-01-01 PROCEDURE — 86140 C-REACTIVE PROTEIN: CPT | Performed by: EMERGENCY MEDICINE

## 2021-01-01 PROCEDURE — 99238 PR HOSPITAL DISCHARGE DAY,<30 MIN: ICD-10-PCS | Mod: ,,, | Performed by: INTERNAL MEDICINE

## 2021-01-01 PROCEDURE — 80202 ASSAY OF VANCOMYCIN: CPT

## 2021-01-01 PROCEDURE — 25000003 PHARM REV CODE 250: Performed by: SURGERY

## 2021-01-01 PROCEDURE — 63600175 PHARM REV CODE 636 W HCPCS: Mod: JG | Performed by: NURSE PRACTITIONER

## 2021-01-01 PROCEDURE — 99233 SBSQ HOSP IP/OBS HIGH 50: CPT | Mod: ,,, | Performed by: PODIATRIST

## 2021-01-01 PROCEDURE — 80047 BASIC METABLC PNL IONIZED CA: CPT

## 2021-01-01 PROCEDURE — 99024 DEBRIDEMENT: ICD-10-PCS | Mod: ,,,

## 2021-01-01 PROCEDURE — 28800 PR AMPUTATION FOOT,MIDTARSAL-CHOPART: ICD-10-PCS | Mod: 51,LT,, | Performed by: PODIATRIST

## 2021-01-01 PROCEDURE — 83605 ASSAY OF LACTIC ACID: CPT | Performed by: INTERNAL MEDICINE

## 2021-01-01 PROCEDURE — 99213 PR OFFICE/OUTPT VISIT, EST, LEVL III, 20-29 MIN: ICD-10-PCS | Mod: S$GLB,,, | Performed by: PHYSICIAN ASSISTANT

## 2021-01-01 PROCEDURE — 87102 FUNGUS ISOLATION CULTURE: CPT | Performed by: INTERNAL MEDICINE

## 2021-01-01 PROCEDURE — 75710 PR  ANGIO EXTREMITY UNILAT: ICD-10-PCS | Mod: 26,59,, | Performed by: SURGERY

## 2021-01-01 PROCEDURE — 36000706: Performed by: SURGERY

## 2021-01-01 PROCEDURE — 88305 TISSUE EXAM BY PATHOLOGIST: CPT | Performed by: STUDENT IN AN ORGANIZED HEALTH CARE EDUCATION/TRAINING PROGRAM

## 2021-01-01 PROCEDURE — 99223 1ST HOSP IP/OBS HIGH 75: CPT | Mod: 25,,, | Performed by: NURSE PRACTITIONER

## 2021-01-01 PROCEDURE — G0378 HOSPITAL OBSERVATION PER HR: HCPCS

## 2021-01-01 PROCEDURE — 90935 HEMODIALYSIS ONE EVALUATION: CPT

## 2021-01-01 PROCEDURE — 99024 POSTOP FOLLOW-UP VISIT: CPT | Mod: ,,, | Performed by: SURGERY

## 2021-01-01 PROCEDURE — 99233 PR SUBSEQUENT HOSPITAL CARE,LEVL III: ICD-10-PCS | Mod: 95,,, | Performed by: INTERNAL MEDICINE

## 2021-01-01 PROCEDURE — 99214 PR OFFICE/OUTPT VISIT, EST, LEVL IV, 30-39 MIN: ICD-10-PCS | Mod: S$GLB,,, | Performed by: INTERNAL MEDICINE

## 2021-01-01 PROCEDURE — 36430 TRANSFUSION BLD/BLD COMPNT: CPT

## 2021-01-01 PROCEDURE — 96367 TX/PROPH/DG ADDL SEQ IV INF: CPT

## 2021-01-01 PROCEDURE — 99285 EMERGENCY DEPT VISIT HI MDM: CPT | Mod: CS,,, | Performed by: EMERGENCY MEDICINE

## 2021-01-01 PROCEDURE — 99999 PR PBB SHADOW E&M-EST. PATIENT-LVL III: ICD-10-PCS | Mod: PBBFAC,,, | Performed by: PODIATRIST

## 2021-01-01 PROCEDURE — 94799 UNLISTED PULMONARY SVC/PX: CPT

## 2021-01-01 PROCEDURE — 96375 TX/PRO/DX INJ NEW DRUG ADDON: CPT

## 2021-01-01 PROCEDURE — 99213 PR OFFICE/OUTPT VISIT, EST, LEVL III, 20-29 MIN: ICD-10-PCS | Mod: 25,S$GLB,, | Performed by: PODIATRIST

## 2021-01-01 PROCEDURE — 87040 BLOOD CULTURE FOR BACTERIA: CPT | Mod: 59 | Performed by: INTERNAL MEDICINE

## 2021-01-01 PROCEDURE — 87186 SC STD MICRODIL/AGAR DIL: CPT | Performed by: STUDENT IN AN ORGANIZED HEALTH CARE EDUCATION/TRAINING PROGRAM

## 2021-01-01 PROCEDURE — 28810 AMPUTATION TOE & METATARSAL: CPT | Mod: 51,T1,, | Performed by: SURGERY

## 2021-01-01 PROCEDURE — 28805 AMPUTATION THRU METATARSAL: CPT | Mod: 51,LT,, | Performed by: PODIATRIST

## 2021-01-01 PROCEDURE — C9399 UNCLASSIFIED DRUGS OR BIOLOG: HCPCS | Performed by: HOSPITALIST

## 2021-01-01 PROCEDURE — 71000016 HC POSTOP RECOV ADDL HR: Performed by: SURGERY

## 2021-01-01 PROCEDURE — 99215 OFFICE O/P EST HI 40 MIN: CPT | Mod: S$GLB,,, | Performed by: INTERNAL MEDICINE

## 2021-01-01 PROCEDURE — 87077 CULTURE AEROBIC IDENTIFY: CPT | Performed by: PODIATRIST

## 2021-01-01 PROCEDURE — 71000015 HC POSTOP RECOV 1ST HR: Performed by: SURGERY

## 2021-01-01 PROCEDURE — 76642 ULTRASOUND BREAST LIMITED: CPT | Mod: 26,RT,, | Performed by: RADIOLOGY

## 2021-01-01 PROCEDURE — 86920 COMPATIBILITY TEST SPIN: CPT | Performed by: INTERNAL MEDICINE

## 2021-01-01 PROCEDURE — U0003 INFECTIOUS AGENT DETECTION BY NUCLEIC ACID (DNA OR RNA); SEVERE ACUTE RESPIRATORY SYNDROME CORONAVIRUS 2 (SARS-COV-2) (CORONAVIRUS DISEASE [COVID-19]), AMPLIFIED PROBE TECHNIQUE, MAKING USE OF HIGH THROUGHPUT TECHNOLOGIES AS DESCRIBED BY CMS-2020-01-R: HCPCS | Performed by: NURSE PRACTITIONER

## 2021-01-01 PROCEDURE — 83036 HEMOGLOBIN GLYCOSYLATED A1C: CPT | Performed by: PHYSICIAN ASSISTANT

## 2021-01-01 PROCEDURE — 87116 MYCOBACTERIA CULTURE: CPT | Performed by: INTERNAL MEDICINE

## 2021-01-01 PROCEDURE — 64447 NJX AA&/STRD FEMORAL NRV IMG: CPT | Mod: 59,LT,, | Performed by: ANESTHESIOLOGY

## 2021-01-01 PROCEDURE — 99203 OFFICE O/P NEW LOW 30 MIN: CPT | Mod: S$GLB,,, | Performed by: STUDENT IN AN ORGANIZED HEALTH CARE EDUCATION/TRAINING PROGRAM

## 2021-01-01 PROCEDURE — 94664 DEMO&/EVAL PT USE INHALER: CPT

## 2021-01-01 PROCEDURE — 86140 C-REACTIVE PROTEIN: CPT | Performed by: INTERNAL MEDICINE

## 2021-01-01 PROCEDURE — 83970 ASSAY OF PARATHORMONE: CPT | Performed by: INTERNAL MEDICINE

## 2021-01-01 PROCEDURE — 25500020 PHARM REV CODE 255: Performed by: INTERNAL MEDICINE

## 2021-01-01 PROCEDURE — 99239 PR HOSPITAL DISCHARGE DAY,>30 MIN: ICD-10-PCS | Mod: ,,, | Performed by: INTERNAL MEDICINE

## 2021-01-01 PROCEDURE — 99999 PR PBB SHADOW E&M-EST. PATIENT-LVL V: CPT | Mod: PBBFAC,,, | Performed by: INTERNAL MEDICINE

## 2021-01-01 PROCEDURE — 63600175 PHARM REV CODE 636 W HCPCS: Performed by: SURGERY

## 2021-01-01 PROCEDURE — 85025 COMPLETE CBC W/AUTO DIFF WBC: CPT | Mod: 91 | Performed by: INTERNAL MEDICINE

## 2021-01-01 PROCEDURE — 83735 ASSAY OF MAGNESIUM: CPT

## 2021-01-01 PROCEDURE — 77065 MAMMO DIGITAL DIAGNOSTIC RIGHT WITH TOMO: ICD-10-PCS | Mod: 26,RT,, | Performed by: RADIOLOGY

## 2021-01-01 PROCEDURE — 83605 ASSAY OF LACTIC ACID: CPT | Performed by: PHYSICIAN ASSISTANT

## 2021-01-01 PROCEDURE — 87340 HEPATITIS B SURFACE AG IA: CPT | Performed by: EMERGENCY MEDICINE

## 2021-01-01 PROCEDURE — 20220 BONE BIOPSY TROCAR/NDL SUPFC: CPT | Mod: 58,S$GLB,, | Performed by: PODIATRIST

## 2021-01-01 PROCEDURE — 99284 PR EMERGENCY DEPT VISIT,LEVEL IV: ICD-10-PCS | Mod: CS,,, | Performed by: EMERGENCY MEDICINE

## 2021-01-01 PROCEDURE — 83605 ASSAY OF LACTIC ACID: CPT | Mod: 91 | Performed by: EMERGENCY MEDICINE

## 2021-01-01 PROCEDURE — 87040 BLOOD CULTURE FOR BACTERIA: CPT | Performed by: PHYSICIAN ASSISTANT

## 2021-01-01 PROCEDURE — 87176 TISSUE HOMOGENIZATION CULTR: CPT | Performed by: INTERNAL MEDICINE

## 2021-01-01 PROCEDURE — 97164 PT RE-EVAL EST PLAN CARE: CPT

## 2021-01-01 PROCEDURE — 88305 TISSUE EXAM BY PATHOLOGIST: ICD-10-PCS | Mod: 26,,, | Performed by: PATHOLOGY

## 2021-01-01 PROCEDURE — 36246 PR INS CATH ABD/L-EXT ART 2ND ORDER: ICD-10-PCS | Mod: 79,LT,, | Performed by: SURGERY

## 2021-01-01 PROCEDURE — C9399 UNCLASSIFIED DRUGS OR BIOLOG: HCPCS | Performed by: PHYSICIAN ASSISTANT

## 2021-01-01 PROCEDURE — 99223 1ST HOSP IP/OBS HIGH 75: CPT | Mod: ,,, | Performed by: PODIATRIST

## 2021-01-01 PROCEDURE — 36415 COLL VENOUS BLD VENIPUNCTURE: CPT | Performed by: NURSE PRACTITIONER

## 2021-01-01 PROCEDURE — 99223 PR INITIAL HOSPITAL CARE,LEVL III: ICD-10-PCS | Mod: GC,,, | Performed by: SURGERY

## 2021-01-01 PROCEDURE — 99999 PR PBB SHADOW E&M-EST. PATIENT-LVL V: ICD-10-PCS | Mod: PBBFAC,,, | Performed by: INTERNAL MEDICINE

## 2021-01-01 PROCEDURE — 99495 TRANSJ CARE MGMT MOD F2F 14D: CPT | Mod: 95,,, | Performed by: INTERNAL MEDICINE

## 2021-01-01 PROCEDURE — 99239 PR HOSPITAL DISCHARGE DAY,>30 MIN: ICD-10-PCS | Mod: GC,,, | Performed by: STUDENT IN AN ORGANIZED HEALTH CARE EDUCATION/TRAINING PROGRAM

## 2021-01-01 PROCEDURE — 64447 ADDUCTOR CANAL SINGLE INJECTION BLOCK: ICD-10-PCS | Mod: 59,LT,, | Performed by: ANESTHESIOLOGY

## 2021-01-01 PROCEDURE — 37000009 HC ANESTHESIA EA ADD 15 MINS: Performed by: SURGERY

## 2021-01-01 PROCEDURE — 99999 PR PBB SHADOW E&M-EST. PATIENT-LVL V: CPT | Mod: PBBFAC,GC,, | Performed by: INTERNAL MEDICINE

## 2021-01-01 PROCEDURE — 28800 AMPUTATION OF MIDFOOT: CPT | Mod: 51,LT,, | Performed by: PODIATRIST

## 2021-01-01 PROCEDURE — 83036 HEMOGLOBIN GLYCOSYLATED A1C: CPT | Performed by: HOSPITALIST

## 2021-01-01 PROCEDURE — 64450 NJX AA&/STRD OTHER PN/BRANCH: CPT | Mod: 59,LT,, | Performed by: ANESTHESIOLOGY

## 2021-01-01 PROCEDURE — 88305 TISSUE EXAM BY PATHOLOGIST: CPT | Mod: 26,,, | Performed by: STUDENT IN AN ORGANIZED HEALTH CARE EDUCATION/TRAINING PROGRAM

## 2021-01-01 PROCEDURE — 99213 PR OFFICE/OUTPT VISIT, EST, LEVL III, 20-29 MIN: ICD-10-PCS | Mod: S$GLB,,, | Performed by: NURSE PRACTITIONER

## 2021-01-01 PROCEDURE — 97110 THERAPEUTIC EXERCISES: CPT | Mod: CQ

## 2021-01-01 PROCEDURE — 99024 POSTOP FOLLOW-UP VISIT: CPT | Mod: ,,,

## 2021-01-01 PROCEDURE — 99232 PR SUBSEQUENT HOSPITAL CARE,LEVL II: ICD-10-PCS | Mod: ,,, | Performed by: HOSPITALIST

## 2021-01-01 PROCEDURE — 99203 PR OFFICE/OUTPT VISIT, NEW, LEVL III, 30-44 MIN: ICD-10-PCS | Mod: S$GLB,,, | Performed by: STUDENT IN AN ORGANIZED HEALTH CARE EDUCATION/TRAINING PROGRAM

## 2021-01-01 PROCEDURE — 99223 PR INITIAL HOSPITAL CARE,LEVL III: ICD-10-PCS | Mod: AI,GC,, | Performed by: HOSPITALIST

## 2021-01-01 PROCEDURE — 84145 PROCALCITONIN (PCT): CPT | Performed by: PHYSICIAN ASSISTANT

## 2021-01-01 PROCEDURE — 63600175 PHARM REV CODE 636 W HCPCS: Performed by: NURSE PRACTITIONER

## 2021-01-01 PROCEDURE — 14041 PR ADJ TISS XFER HEAD,FAC,HAND 10.1-30 SQCM: ICD-10-PCS | Mod: ,,, | Performed by: PODIATRIST

## 2021-01-01 PROCEDURE — 99499 NO LOS: ICD-10-PCS | Mod: ,,, | Performed by: NURSE PRACTITIONER

## 2021-01-01 PROCEDURE — 37000008 HC ANESTHESIA 1ST 15 MINUTES: Performed by: SURGERY

## 2021-01-01 PROCEDURE — 97168 OT RE-EVAL EST PLAN CARE: CPT

## 2021-01-01 PROCEDURE — 99238 HOSP IP/OBS DSCHRG MGMT 30/<: CPT | Mod: GC,,, | Performed by: HOSPITALIST

## 2021-01-01 PROCEDURE — 37226 PR FEM/POPL REVASC W/STENT: CPT | Mod: LT,,, | Performed by: SURGERY

## 2021-01-01 PROCEDURE — 99239 PR HOSPITAL DISCHARGE DAY,>30 MIN: ICD-10-PCS | Mod: GT,,, | Performed by: INTERNAL MEDICINE

## 2021-01-01 PROCEDURE — 87070 CULTURE OTHR SPECIMN AEROBIC: CPT | Performed by: INTERNAL MEDICINE

## 2021-01-01 PROCEDURE — 99223 PR INITIAL HOSPITAL CARE,LEVL III: ICD-10-PCS | Mod: 25,,, | Performed by: NURSE PRACTITIONER

## 2021-01-01 PROCEDURE — 83880 ASSAY OF NATRIURETIC PEPTIDE: CPT | Performed by: INTERNAL MEDICINE

## 2021-01-01 PROCEDURE — 76942 ECHO GUIDE FOR BIOPSY: CPT | Mod: 26,,, | Performed by: ANESTHESIOLOGY

## 2021-01-01 PROCEDURE — 80074 ACUTE HEPATITIS PANEL: CPT | Performed by: HOSPITALIST

## 2021-01-01 PROCEDURE — 99214 PR OFFICE/OUTPT VISIT, EST, LEVL IV, 30-39 MIN: ICD-10-PCS | Mod: GC,S$GLB,, | Performed by: INTERNAL MEDICINE

## 2021-01-01 PROCEDURE — 99285 EMERGENCY DEPT VISIT HI MDM: CPT | Mod: ,,, | Performed by: EMERGENCY MEDICINE

## 2021-01-01 PROCEDURE — 87040 BLOOD CULTURE FOR BACTERIA: CPT | Mod: 59 | Performed by: EMERGENCY MEDICINE

## 2021-01-01 PROCEDURE — 99499 UNLISTED E&M SERVICE: CPT | Mod: ,,, | Performed by: NURSE PRACTITIONER

## 2021-01-01 PROCEDURE — C1876 STENT, NON-COA/NON-COV W/DEL: HCPCS | Performed by: SURGERY

## 2021-01-01 PROCEDURE — 97535 SELF CARE MNGMENT TRAINING: CPT | Mod: CO

## 2021-01-01 PROCEDURE — 99213 OFFICE O/P EST LOW 20 MIN: CPT | Mod: S$GLB,,, | Performed by: NURSE PRACTITIONER

## 2021-01-01 PROCEDURE — 71000039 HC RECOVERY, EACH ADD'L HOUR: Performed by: PODIATRIST

## 2021-01-01 PROCEDURE — 85520 HEPARIN ASSAY: CPT | Performed by: STUDENT IN AN ORGANIZED HEALTH CARE EDUCATION/TRAINING PROGRAM

## 2021-01-01 PROCEDURE — 25000003 PHARM REV CODE 250: Performed by: ANESTHESIOLOGY

## 2021-01-01 PROCEDURE — 99999 PR PBB SHADOW E&M-EST. PATIENT-LVL IV: CPT | Mod: PBBFAC,,, | Performed by: STUDENT IN AN ORGANIZED HEALTH CARE EDUCATION/TRAINING PROGRAM

## 2021-01-01 PROCEDURE — 87075 CULTR BACTERIA EXCEPT BLOOD: CPT | Performed by: INTERNAL MEDICINE

## 2021-01-01 PROCEDURE — 82800 BLOOD PH: CPT

## 2021-01-01 PROCEDURE — U0005 INFEC AGEN DETEC AMPLI PROBE: HCPCS | Performed by: HOSPITALIST

## 2021-01-01 PROCEDURE — 85610 PROTHROMBIN TIME: CPT | Performed by: EMERGENCY MEDICINE

## 2021-01-01 PROCEDURE — 73140 XR FINGER 2 OR MORE VIEWS RIGHT: ICD-10-PCS | Mod: 26,RT,, | Performed by: RADIOLOGY

## 2021-01-01 PROCEDURE — 99213 OFFICE O/P EST LOW 20 MIN: CPT | Mod: 25,S$GLB,, | Performed by: PODIATRIST

## 2021-01-01 PROCEDURE — 84075 ASSAY ALKALINE PHOSPHATASE: CPT | Performed by: INTERNAL MEDICINE

## 2021-01-01 PROCEDURE — 85379 FIBRIN DEGRADATION QUANT: CPT | Performed by: STUDENT IN AN ORGANIZED HEALTH CARE EDUCATION/TRAINING PROGRAM

## 2021-01-01 PROCEDURE — 87206 SMEAR FLUORESCENT/ACID STAI: CPT | Performed by: INTERNAL MEDICINE

## 2021-01-01 PROCEDURE — 87077 CULTURE AEROBIC IDENTIFY: CPT | Performed by: STUDENT IN AN ORGANIZED HEALTH CARE EDUCATION/TRAINING PROGRAM

## 2021-01-01 PROCEDURE — 97166 OT EVAL MOD COMPLEX 45 MIN: CPT

## 2021-01-01 DEVICE — IMPLANTABLE DEVICE: Type: IMPLANTABLE DEVICE | Site: OTHER (ADD COMMENT) | Status: FUNCTIONAL

## 2021-01-01 RX ORDER — FENTANYL CITRATE 50 UG/ML
25 INJECTION, SOLUTION INTRAMUSCULAR; INTRAVENOUS EVERY 5 MIN PRN
Status: DISCONTINUED | OUTPATIENT
Start: 2021-01-01 | End: 2021-01-01 | Stop reason: HOSPADM

## 2021-01-01 RX ORDER — HYDRALAZINE HYDROCHLORIDE 100 MG/1
100 TABLET, FILM COATED ORAL 3 TIMES DAILY
COMMUNITY
End: 2021-01-01 | Stop reason: SDUPTHER

## 2021-01-01 RX ORDER — LEVALBUTEROL 1.25 MG/.5ML
1.25 SOLUTION, CONCENTRATE RESPIRATORY (INHALATION) EVERY 8 HOURS
Status: DISCONTINUED | OUTPATIENT
Start: 2021-01-01 | End: 2021-01-01

## 2021-01-01 RX ORDER — INSULIN ASPART 100 [IU]/ML
0-5 INJECTION, SOLUTION INTRAVENOUS; SUBCUTANEOUS
Status: DISCONTINUED | OUTPATIENT
Start: 2021-01-01 | End: 2021-01-01 | Stop reason: HOSPADM

## 2021-01-01 RX ORDER — ALBUTEROL SULFATE 90 UG/1
2 AEROSOL, METERED RESPIRATORY (INHALATION) EVERY 4 HOURS PRN
Status: DISCONTINUED | OUTPATIENT
Start: 2021-01-01 | End: 2021-01-01 | Stop reason: HOSPADM

## 2021-01-01 RX ORDER — SEVELAMER CARBONATE 800 MG/1
1600 TABLET, FILM COATED ORAL
Qty: 180 TABLET | Refills: 11 | Status: SHIPPED | OUTPATIENT
Start: 2021-01-01 | End: 2022-01-01 | Stop reason: CLARIF

## 2021-01-01 RX ORDER — ACETAMINOPHEN 500 MG
1000 TABLET ORAL 3 TIMES DAILY
Status: DISCONTINUED | OUTPATIENT
Start: 2021-01-01 | End: 2021-01-01

## 2021-01-01 RX ORDER — OXYCODONE HYDROCHLORIDE 5 MG/1
5 TABLET ORAL EVERY 6 HOURS PRN
Qty: 20 TABLET | Refills: 0 | Status: SHIPPED | OUTPATIENT
Start: 2021-01-01 | End: 2021-01-01 | Stop reason: HOSPADM

## 2021-01-01 RX ORDER — CEFEPIME HYDROCHLORIDE 2 G/1
2 INJECTION, POWDER, FOR SOLUTION INTRAVENOUS
Qty: 2 G | Refills: 0
Start: 2021-01-01 | End: 2021-01-01 | Stop reason: HOSPADM

## 2021-01-01 RX ORDER — GABAPENTIN 300 MG/1
300 CAPSULE ORAL NIGHTLY
Qty: 30 CAPSULE | Refills: 11 | Status: CANCELLED | OUTPATIENT
Start: 2021-01-01 | End: 2022-07-28

## 2021-01-01 RX ORDER — ATORVASTATIN CALCIUM 20 MG/1
40 TABLET, FILM COATED ORAL DAILY
Status: DISCONTINUED | OUTPATIENT
Start: 2021-01-01 | End: 2021-01-01 | Stop reason: HOSPADM

## 2021-01-01 RX ORDER — NALOXONE HCL 0.4 MG/ML
0.02 VIAL (ML) INJECTION
Status: DISCONTINUED | OUTPATIENT
Start: 2021-01-01 | End: 2021-01-01 | Stop reason: HOSPADM

## 2021-01-01 RX ORDER — DOXYCYCLINE HYCLATE 100 MG
100 TABLET ORAL EVERY 12 HOURS
Status: DISCONTINUED | OUTPATIENT
Start: 2021-01-01 | End: 2021-01-01 | Stop reason: HOSPADM

## 2021-01-01 RX ORDER — IBUPROFEN 200 MG
16 TABLET ORAL
Status: DISCONTINUED | OUTPATIENT
Start: 2021-01-01 | End: 2021-01-01

## 2021-01-01 RX ORDER — SODIUM CHLORIDE 9 MG/ML
INJECTION, SOLUTION INTRAVENOUS ONCE
Status: DISCONTINUED | OUTPATIENT
Start: 2021-01-01 | End: 2021-01-01

## 2021-01-01 RX ORDER — AMOXICILLIN 250 MG
1 CAPSULE ORAL 2 TIMES DAILY
Status: DISCONTINUED | OUTPATIENT
Start: 2021-01-01 | End: 2021-01-01 | Stop reason: HOSPADM

## 2021-01-01 RX ORDER — POLYETHYLENE GLYCOL 3350 17 G/17G
17 POWDER, FOR SOLUTION ORAL DAILY
Qty: 510 G | Refills: 1 | Status: ON HOLD | OUTPATIENT
Start: 2021-01-01 | End: 2021-01-01 | Stop reason: HOSPADM

## 2021-01-01 RX ORDER — OXYCODONE HYDROCHLORIDE 5 MG/1
5 TABLET ORAL EVERY 6 HOURS PRN
Status: DISCONTINUED | OUTPATIENT
Start: 2021-01-01 | End: 2021-01-01

## 2021-01-01 RX ORDER — SODIUM CHLORIDE 9 MG/ML
INJECTION, SOLUTION INTRAVENOUS ONCE
Status: DISCONTINUED | OUTPATIENT
Start: 2021-01-01 | End: 2021-01-01 | Stop reason: HOSPADM

## 2021-01-01 RX ORDER — AMOXICILLIN AND CLAVULANATE POTASSIUM 500; 125 MG/1; MG/1
1 TABLET, FILM COATED ORAL DAILY
Status: DISCONTINUED | OUTPATIENT
Start: 2021-01-01 | End: 2021-01-01 | Stop reason: HOSPADM

## 2021-01-01 RX ORDER — HEPARIN SODIUM 5000 [USP'U]/ML
5000 INJECTION, SOLUTION INTRAVENOUS; SUBCUTANEOUS EVERY 8 HOURS
Status: DISCONTINUED | OUTPATIENT
Start: 2021-01-01 | End: 2021-01-01

## 2021-01-01 RX ORDER — CARVEDILOL 6.25 MG/1
6.25 TABLET ORAL 2 TIMES DAILY
Status: DISCONTINUED | OUTPATIENT
Start: 2021-01-01 | End: 2021-01-01 | Stop reason: HOSPADM

## 2021-01-01 RX ORDER — GABAPENTIN 300 MG/1
300 CAPSULE ORAL NIGHTLY
Status: DISCONTINUED | OUTPATIENT
Start: 2021-01-01 | End: 2021-01-01 | Stop reason: HOSPADM

## 2021-01-01 RX ORDER — SODIUM CHLORIDE 9 MG/ML
INJECTION, SOLUTION INTRAVENOUS ONCE
Status: COMPLETED | OUTPATIENT
Start: 2021-01-01 | End: 2021-01-01

## 2021-01-01 RX ORDER — ACETAMINOPHEN 325 MG/1
650 TABLET ORAL 3 TIMES DAILY
Status: DISCONTINUED | OUTPATIENT
Start: 2021-01-01 | End: 2021-01-01

## 2021-01-01 RX ORDER — CEFEPIME HYDROCHLORIDE 1 G/1
1 INJECTION, POWDER, FOR SOLUTION INTRAMUSCULAR; INTRAVENOUS
Status: COMPLETED | OUTPATIENT
Start: 2021-01-01 | End: 2021-01-01

## 2021-01-01 RX ORDER — HEPARIN SODIUM 1000 [USP'U]/ML
INJECTION, SOLUTION INTRAVENOUS; SUBCUTANEOUS
Status: DISCONTINUED | OUTPATIENT
Start: 2021-01-01 | End: 2021-01-01 | Stop reason: HOSPADM

## 2021-01-01 RX ORDER — HYDRALAZINE HYDROCHLORIDE 25 MG/1
25 TABLET, FILM COATED ORAL 2 TIMES DAILY
Status: DISCONTINUED | OUTPATIENT
Start: 2021-01-01 | End: 2021-01-01 | Stop reason: HOSPADM

## 2021-01-01 RX ORDER — OXYCODONE AND ACETAMINOPHEN 5; 325 MG/1; MG/1
1 TABLET ORAL
Status: COMPLETED | OUTPATIENT
Start: 2021-01-01 | End: 2021-01-01

## 2021-01-01 RX ORDER — INSULIN ASPART 100 [IU]/ML
3 INJECTION, SOLUTION INTRAVENOUS; SUBCUTANEOUS
Qty: 1 BOX | Refills: 3 | Status: CANCELLED
Start: 2021-01-01 | End: 2022-07-28

## 2021-01-01 RX ORDER — SODIUM CHLORIDE 0.9 % (FLUSH) 0.9 %
10 SYRINGE (ML) INJECTION
Status: DISCONTINUED | OUTPATIENT
Start: 2021-01-01 | End: 2021-01-01

## 2021-01-01 RX ORDER — ACETAMINOPHEN AND CODEINE PHOSPHATE 300; 30 MG/1; MG/1
1 TABLET ORAL EVERY 4 HOURS PRN
Status: DISCONTINUED | OUTPATIENT
Start: 2021-01-01 | End: 2021-01-01

## 2021-01-01 RX ORDER — ACETAMINOPHEN 325 MG/1
650 TABLET ORAL EVERY 4 HOURS PRN
Status: DISCONTINUED | OUTPATIENT
Start: 2021-01-01 | End: 2021-01-01

## 2021-01-01 RX ORDER — TALC
6 POWDER (GRAM) TOPICAL NIGHTLY PRN
Status: DISCONTINUED | OUTPATIENT
Start: 2021-01-01 | End: 2021-01-01 | Stop reason: HOSPADM

## 2021-01-01 RX ORDER — DILTIAZEM HYDROCHLORIDE 180 MG/1
180 CAPSULE, COATED, EXTENDED RELEASE ORAL DAILY
Status: DISCONTINUED | OUTPATIENT
Start: 2021-01-01 | End: 2021-01-01

## 2021-01-01 RX ORDER — IODIXANOL 320 MG/ML
INJECTION, SOLUTION INTRAVASCULAR
Status: DISCONTINUED | OUTPATIENT
Start: 2021-01-01 | End: 2021-01-01 | Stop reason: HOSPADM

## 2021-01-01 RX ORDER — GLUCAGON 1 MG
1 KIT INJECTION
Status: DISCONTINUED | OUTPATIENT
Start: 2021-01-01 | End: 2021-01-01 | Stop reason: HOSPADM

## 2021-01-01 RX ORDER — IBUPROFEN 200 MG
24 TABLET ORAL
Status: DISCONTINUED | OUTPATIENT
Start: 2021-01-01 | End: 2021-01-01 | Stop reason: HOSPADM

## 2021-01-01 RX ORDER — CARVEDILOL 25 MG/1
25 TABLET ORAL 2 TIMES DAILY
Qty: 60 TABLET | Refills: 3 | Status: ON HOLD | OUTPATIENT
Start: 2021-01-01 | End: 2021-01-01 | Stop reason: HOSPADM

## 2021-01-01 RX ORDER — SEVELAMER CARBONATE 800 MG/1
1600 TABLET, FILM COATED ORAL
Status: DISCONTINUED | OUTPATIENT
Start: 2021-01-01 | End: 2021-01-01 | Stop reason: HOSPADM

## 2021-01-01 RX ORDER — ACETAMINOPHEN AND CODEINE PHOSPHATE 300; 30 MG/1; MG/1
1 TABLET ORAL EVERY 8 HOURS PRN
Qty: 30 TABLET | Refills: 0 | Status: SHIPPED | OUTPATIENT
Start: 2021-01-01 | End: 2022-01-01 | Stop reason: SDUPTHER

## 2021-01-01 RX ORDER — GABAPENTIN 300 MG/1
300 CAPSULE ORAL NIGHTLY
Qty: 60 CAPSULE | Refills: 0 | Status: ON HOLD
Start: 2021-01-01 | End: 2022-01-01

## 2021-01-01 RX ORDER — HEPARIN SODIUM 1000 [USP'U]/ML
1000 INJECTION, SOLUTION INTRAVENOUS; SUBCUTANEOUS
Status: DISCONTINUED | OUTPATIENT
Start: 2021-01-01 | End: 2021-01-01

## 2021-01-01 RX ORDER — LIDOCAINE HYDROCHLORIDE 10 MG/ML
INJECTION, SOLUTION EPIDURAL; INFILTRATION; INTRACAUDAL; PERINEURAL
Status: DISCONTINUED | OUTPATIENT
Start: 2021-01-01 | End: 2021-01-01 | Stop reason: HOSPADM

## 2021-01-01 RX ORDER — CARVEDILOL 12.5 MG/1
12.5 TABLET ORAL 2 TIMES DAILY WITH MEALS
Qty: 180 TABLET | Refills: 3 | Status: ON HOLD | OUTPATIENT
Start: 2021-01-01 | End: 2022-01-01 | Stop reason: HOSPADM

## 2021-01-01 RX ORDER — CEFEPIME HYDROCHLORIDE 1 G/1
1 INJECTION, POWDER, FOR SOLUTION INTRAMUSCULAR; INTRAVENOUS
Status: DISCONTINUED | OUTPATIENT
Start: 2021-01-01 | End: 2021-01-01

## 2021-01-01 RX ORDER — DILTIAZEM HYDROCHLORIDE 120 MG/1
240 CAPSULE, COATED, EXTENDED RELEASE ORAL DAILY
Status: DISCONTINUED | OUTPATIENT
Start: 2021-01-01 | End: 2021-01-01 | Stop reason: HOSPADM

## 2021-01-01 RX ORDER — INSULIN ASPART 100 [IU]/ML
3 INJECTION, SOLUTION INTRAVENOUS; SUBCUTANEOUS
Status: DISCONTINUED | OUTPATIENT
Start: 2021-01-01 | End: 2021-01-01 | Stop reason: HOSPADM

## 2021-01-01 RX ORDER — LABETALOL HYDROCHLORIDE 5 MG/ML
10 INJECTION, SOLUTION INTRAVENOUS ONCE AS NEEDED
Status: COMPLETED | OUTPATIENT
Start: 2021-01-01 | End: 2021-01-01

## 2021-01-01 RX ORDER — ACETAMINOPHEN AND CODEINE PHOSPHATE 300; 30 MG/1; MG/1
1 TABLET ORAL EVERY 8 HOURS PRN
Qty: 20 TABLET | Refills: 0 | Status: ON HOLD | OUTPATIENT
Start: 2021-01-01 | End: 2021-01-01 | Stop reason: HOSPADM

## 2021-01-01 RX ORDER — SODIUM CHLORIDE 0.9 % (FLUSH) 0.9 %
10 SYRINGE (ML) INJECTION
Status: DISCONTINUED | OUTPATIENT
Start: 2021-01-01 | End: 2021-01-01 | Stop reason: HOSPADM

## 2021-01-01 RX ORDER — MIDAZOLAM HYDROCHLORIDE 1 MG/ML
0.5 INJECTION INTRAMUSCULAR; INTRAVENOUS
Status: DISCONTINUED | OUTPATIENT
Start: 2021-01-01 | End: 2021-01-01 | Stop reason: HOSPADM

## 2021-01-01 RX ORDER — LETROZOLE 2.5 MG/1
2.5 TABLET, FILM COATED ORAL NIGHTLY
Status: DISCONTINUED | OUTPATIENT
Start: 2021-01-01 | End: 2021-01-01 | Stop reason: HOSPADM

## 2021-01-01 RX ORDER — ACETAMINOPHEN AND CODEINE PHOSPHATE 300; 30 MG/1; MG/1
1 TABLET ORAL EVERY 4 HOURS PRN
Status: DISCONTINUED | OUTPATIENT
Start: 2021-01-01 | End: 2021-01-01 | Stop reason: HOSPADM

## 2021-01-01 RX ORDER — HEPARIN SODIUM 5000 [USP'U]/ML
7500 INJECTION, SOLUTION INTRAVENOUS; SUBCUTANEOUS EVERY 12 HOURS
Status: DISCONTINUED | OUTPATIENT
Start: 2021-01-01 | End: 2021-01-01 | Stop reason: HOSPADM

## 2021-01-01 RX ORDER — FLUTICASONE PROPIONATE 50 MCG
2 SPRAY, SUSPENSION (ML) NASAL DAILY
Status: DISCONTINUED | OUTPATIENT
Start: 2021-01-01 | End: 2021-01-01

## 2021-01-01 RX ORDER — VANCOMYCIN HCL IN 5 % DEXTROSE 1G/250ML
1000 PLASTIC BAG, INJECTION (ML) INTRAVENOUS ONCE
Status: DISCONTINUED | OUTPATIENT
Start: 2021-01-01 | End: 2021-01-01 | Stop reason: HOSPADM

## 2021-01-01 RX ORDER — HEPARIN SODIUM 5000 [USP'U]/ML
7500 INJECTION, SOLUTION INTRAVENOUS; SUBCUTANEOUS EVERY 12 HOURS
Status: DISPENSED | OUTPATIENT
Start: 2021-01-01 | End: 2021-01-01

## 2021-01-01 RX ORDER — ACETAMINOPHEN 325 MG/1
650 TABLET ORAL EVERY 6 HOURS
Status: DISCONTINUED | OUTPATIENT
Start: 2021-01-01 | End: 2021-01-01 | Stop reason: HOSPADM

## 2021-01-01 RX ORDER — POLYETHYLENE GLYCOL 3350 17 G/17G
17 POWDER, FOR SOLUTION ORAL DAILY
Status: DISCONTINUED | OUTPATIENT
Start: 2021-01-01 | End: 2021-01-01 | Stop reason: HOSPADM

## 2021-01-01 RX ORDER — IBUPROFEN 200 MG
24 TABLET ORAL
Status: DISCONTINUED | OUTPATIENT
Start: 2021-01-01 | End: 2021-01-01

## 2021-01-01 RX ORDER — CARVEDILOL 3.12 MG/1
6.25 TABLET ORAL ONCE
Status: COMPLETED | OUTPATIENT
Start: 2021-01-01 | End: 2021-01-01

## 2021-01-01 RX ORDER — CEFEPIME HYDROCHLORIDE 2 G/1
2 INJECTION, POWDER, FOR SOLUTION INTRAVENOUS
Status: CANCELLED | OUTPATIENT
Start: 2021-01-01

## 2021-01-01 RX ORDER — HYDROCODONE BITARTRATE AND ACETAMINOPHEN 500; 5 MG/1; MG/1
TABLET ORAL
Status: DISCONTINUED | OUTPATIENT
Start: 2021-01-01 | End: 2021-01-01

## 2021-01-01 RX ORDER — PROCHLORPERAZINE EDISYLATE 5 MG/ML
5 INJECTION INTRAMUSCULAR; INTRAVENOUS EVERY 6 HOURS PRN
Status: DISCONTINUED | OUTPATIENT
Start: 2021-01-01 | End: 2021-01-01 | Stop reason: HOSPADM

## 2021-01-01 RX ORDER — ASPIRIN 81 MG/1
81 TABLET ORAL DAILY
Status: DISCONTINUED | OUTPATIENT
Start: 2021-01-01 | End: 2021-01-01 | Stop reason: HOSPADM

## 2021-01-01 RX ORDER — LEVALBUTEROL 1.25 MG/.5ML
1.25 SOLUTION, CONCENTRATE RESPIRATORY (INHALATION) EVERY 12 HOURS
Status: DISCONTINUED | OUTPATIENT
Start: 2021-01-01 | End: 2021-01-01

## 2021-01-01 RX ORDER — MIDAZOLAM HYDROCHLORIDE 1 MG/ML
INJECTION, SOLUTION INTRAMUSCULAR; INTRAVENOUS
Status: DISCONTINUED | OUTPATIENT
Start: 2021-01-01 | End: 2021-01-01

## 2021-01-01 RX ORDER — FAMOTIDINE 20 MG/1
20 TABLET, FILM COATED ORAL DAILY PRN
Status: DISCONTINUED | OUTPATIENT
Start: 2021-01-01 | End: 2021-01-01

## 2021-01-01 RX ORDER — INSULIN ASPART 100 [IU]/ML
1-10 INJECTION, SOLUTION INTRAVENOUS; SUBCUTANEOUS
Status: DISCONTINUED | OUTPATIENT
Start: 2021-01-01 | End: 2021-01-01

## 2021-01-01 RX ORDER — ACETAMINOPHEN AND CODEINE PHOSPHATE 300; 30 MG/1; MG/1
1 TABLET ORAL EVERY 6 HOURS PRN
Status: DISCONTINUED | OUTPATIENT
Start: 2021-01-01 | End: 2021-01-01

## 2021-01-01 RX ORDER — IPRATROPIUM BROMIDE AND ALBUTEROL SULFATE 2.5; .5 MG/3ML; MG/3ML
3 SOLUTION RESPIRATORY (INHALATION) EVERY 4 HOURS PRN
Status: DISCONTINUED | OUTPATIENT
Start: 2021-01-01 | End: 2021-01-01 | Stop reason: HOSPADM

## 2021-01-01 RX ORDER — CARVEDILOL 25 MG/1
25 TABLET ORAL 2 TIMES DAILY
Qty: 60 TABLET | Refills: 3 | Status: SHIPPED | OUTPATIENT
Start: 2021-01-01 | End: 2021-01-01

## 2021-01-01 RX ORDER — CEFEPIME HYDROCHLORIDE 2 G/1
2 INJECTION, POWDER, FOR SOLUTION INTRAVENOUS
Status: DISCONTINUED | OUTPATIENT
Start: 2021-01-01 | End: 2021-01-01 | Stop reason: HOSPADM

## 2021-01-01 RX ORDER — FLASH GLUCOSE SCANNING READER
EACH MISCELLANEOUS
Qty: 2 EACH | Refills: 11 | Status: SHIPPED | OUTPATIENT
Start: 2021-01-01

## 2021-01-01 RX ORDER — FLASH GLUCOSE SCANNING READER
1 EACH MISCELLANEOUS CONTINUOUS PRN
Qty: 1 EACH | Refills: 0 | Status: SHIPPED | OUTPATIENT
Start: 2021-01-01

## 2021-01-01 RX ORDER — AMLODIPINE BESYLATE 10 MG/1
10 TABLET ORAL EVERY MORNING
Status: DISCONTINUED | OUTPATIENT
Start: 2021-01-01 | End: 2021-01-01 | Stop reason: HOSPADM

## 2021-01-01 RX ORDER — AMLODIPINE BESYLATE 10 MG/1
10 TABLET ORAL DAILY
Qty: 90 TABLET | Refills: 3 | Status: ON HOLD | OUTPATIENT
Start: 2021-01-01 | End: 2022-01-01 | Stop reason: HOSPADM

## 2021-01-01 RX ORDER — FAMOTIDINE 20 MG/1
20 TABLET, FILM COATED ORAL DAILY
Status: DISCONTINUED | OUTPATIENT
Start: 2021-01-01 | End: 2021-01-01 | Stop reason: HOSPADM

## 2021-01-01 RX ORDER — VANCOMYCIN HCL IN 5 % DEXTROSE 1G/250ML
1000 PLASTIC BAG, INJECTION (ML) INTRAVENOUS
Status: COMPLETED | OUTPATIENT
Start: 2021-01-01 | End: 2021-01-01

## 2021-01-01 RX ORDER — SODIUM CHLORIDE 9 MG/ML
INJECTION, SOLUTION INTRAVENOUS ONCE
Status: CANCELLED | OUTPATIENT
Start: 2021-01-01 | End: 2021-01-01

## 2021-01-01 RX ORDER — GUAIFENESIN 600 MG/1
600 TABLET, EXTENDED RELEASE ORAL 2 TIMES DAILY
Status: DISCONTINUED | OUTPATIENT
Start: 2021-01-01 | End: 2021-01-01 | Stop reason: HOSPADM

## 2021-01-01 RX ORDER — VANCOMYCIN HCL IN 5 % DEXTROSE 1G/250ML
1000 PLASTIC BAG, INJECTION (ML) INTRAVENOUS ONCE
Status: DISCONTINUED | OUTPATIENT
Start: 2021-01-01 | End: 2021-01-01

## 2021-01-01 RX ORDER — IBUPROFEN 200 MG
16 TABLET ORAL
Status: DISCONTINUED | OUTPATIENT
Start: 2021-01-01 | End: 2021-01-01 | Stop reason: HOSPADM

## 2021-01-01 RX ORDER — BUPIVACAINE HYDROCHLORIDE AND EPINEPHRINE 5; 5 MG/ML; UG/ML
INJECTION, SOLUTION EPIDURAL; INTRACAUDAL; PERINEURAL
Status: COMPLETED | OUTPATIENT
Start: 2021-01-01 | End: 2021-01-01

## 2021-01-01 RX ORDER — HYDRALAZINE HYDROCHLORIDE 25 MG/1
25 TABLET, FILM COATED ORAL EVERY 8 HOURS
Status: DISCONTINUED | OUTPATIENT
Start: 2021-01-01 | End: 2021-01-01

## 2021-01-01 RX ORDER — CEFEPIME HYDROCHLORIDE 1 G/1
1 INJECTION, POWDER, FOR SOLUTION INTRAMUSCULAR; INTRAVENOUS
Status: CANCELLED | OUTPATIENT
Start: 2021-01-01 | End: 2021-01-01

## 2021-01-01 RX ORDER — IPRATROPIUM BROMIDE AND ALBUTEROL SULFATE 2.5; .5 MG/3ML; MG/3ML
3 SOLUTION RESPIRATORY (INHALATION) EVERY 6 HOURS PRN
Status: DISCONTINUED | OUTPATIENT
Start: 2021-01-01 | End: 2021-01-01 | Stop reason: HOSPADM

## 2021-01-01 RX ORDER — POTASSIUM CHLORIDE 20 MEQ/1
40 TABLET, EXTENDED RELEASE ORAL ONCE
Status: DISCONTINUED | OUTPATIENT
Start: 2021-01-01 | End: 2021-01-01

## 2021-01-01 RX ORDER — MUPIROCIN 20 MG/G
OINTMENT TOPICAL 2 TIMES DAILY
Status: DISPENSED | OUTPATIENT
Start: 2021-01-01 | End: 2021-01-01

## 2021-01-01 RX ORDER — GUAIFENESIN 600 MG/1
600 TABLET, EXTENDED RELEASE ORAL 2 TIMES DAILY
Status: ON HOLD
Start: 2021-01-01 | End: 2022-01-01

## 2021-01-01 RX ORDER — GLUCAGON 1 MG
1 KIT INJECTION
Status: DISCONTINUED | OUTPATIENT
Start: 2021-01-01 | End: 2021-01-01

## 2021-01-01 RX ORDER — BLOOD-GLUCOSE TRANSMITTER
1 EACH MISCELLANEOUS CONTINUOUS
Qty: 1 DEVICE | Refills: 3 | Status: SHIPPED | OUTPATIENT
Start: 2021-01-01 | End: 2022-04-28

## 2021-01-01 RX ORDER — INSULIN ASPART 100 [IU]/ML
0-5 INJECTION, SOLUTION INTRAVENOUS; SUBCUTANEOUS EVERY 6 HOURS PRN
Status: DISCONTINUED | OUTPATIENT
Start: 2021-01-01 | End: 2021-01-01

## 2021-01-01 RX ORDER — HYDROCODONE BITARTRATE AND ACETAMINOPHEN 500; 5 MG/1; MG/1
TABLET ORAL
Status: DISCONTINUED | OUTPATIENT
Start: 2021-01-01 | End: 2021-01-01 | Stop reason: HOSPADM

## 2021-01-01 RX ORDER — FLASH GLUCOSE SENSOR
KIT MISCELLANEOUS
Qty: 2 KIT | Refills: 11 | Status: SHIPPED | OUTPATIENT
Start: 2021-01-01

## 2021-01-01 RX ORDER — INSULIN ASPART 100 [IU]/ML
0-5 INJECTION, SOLUTION INTRAVENOUS; SUBCUTANEOUS
Status: DISCONTINUED | OUTPATIENT
Start: 2021-01-01 | End: 2021-01-01 | Stop reason: SDUPTHER

## 2021-01-01 RX ORDER — CEFEPIME HYDROCHLORIDE 1 G/50ML
1 INJECTION, SOLUTION INTRAVENOUS
Status: COMPLETED | OUTPATIENT
Start: 2021-01-01 | End: 2021-01-01

## 2021-01-01 RX ORDER — HYDRALAZINE HYDROCHLORIDE 100 MG/1
100 TABLET, FILM COATED ORAL 3 TIMES DAILY
Qty: 270 TABLET | Refills: 0 | Status: ON HOLD | OUTPATIENT
Start: 2021-01-01 | End: 2021-01-01 | Stop reason: HOSPADM

## 2021-01-01 RX ORDER — SODIUM CHLORIDE 0.9 % (FLUSH) 0.9 %
10 SYRINGE (ML) INJECTION
Status: CANCELLED | OUTPATIENT
Start: 2021-01-01

## 2021-01-01 RX ORDER — AMOXICILLIN 250 MG
1 CAPSULE ORAL 2 TIMES DAILY
Start: 2021-01-01 | End: 2022-01-01 | Stop reason: CLARIF

## 2021-01-01 RX ORDER — HEPARIN SODIUM 5000 [USP'U]/ML
7500 INJECTION, SOLUTION INTRAVENOUS; SUBCUTANEOUS EVERY 8 HOURS
Status: DISCONTINUED | OUTPATIENT
Start: 2021-01-01 | End: 2021-01-01 | Stop reason: HOSPADM

## 2021-01-01 RX ORDER — FENTANYL CITRATE 50 UG/ML
INJECTION, SOLUTION INTRAMUSCULAR; INTRAVENOUS
Status: DISCONTINUED | OUTPATIENT
Start: 2021-01-01 | End: 2021-01-01

## 2021-01-01 RX ORDER — CLOPIDOGREL BISULFATE 75 MG/1
75 TABLET ORAL DAILY
Status: DISCONTINUED | OUTPATIENT
Start: 2021-01-01 | End: 2021-01-01 | Stop reason: HOSPADM

## 2021-01-01 RX ORDER — DEXMEDETOMIDINE HYDROCHLORIDE 100 UG/ML
INJECTION, SOLUTION INTRAVENOUS
Status: DISCONTINUED | OUTPATIENT
Start: 2021-01-01 | End: 2021-01-01

## 2021-01-01 RX ORDER — OXYCODONE HYDROCHLORIDE 5 MG/1
5 TABLET ORAL EVERY 4 HOURS PRN
Status: DISCONTINUED | OUTPATIENT
Start: 2021-01-01 | End: 2021-01-01

## 2021-01-01 RX ORDER — ONDANSETRON 2 MG/ML
4 INJECTION INTRAMUSCULAR; INTRAVENOUS EVERY 8 HOURS PRN
Status: DISCONTINUED | OUTPATIENT
Start: 2021-01-01 | End: 2021-01-01 | Stop reason: HOSPADM

## 2021-01-01 RX ORDER — METOPROLOL TARTRATE 1 MG/ML
5 INJECTION, SOLUTION INTRAVENOUS
Status: COMPLETED | OUTPATIENT
Start: 2021-01-01 | End: 2021-01-01

## 2021-01-01 RX ORDER — GABAPENTIN 400 MG/1
400 CAPSULE ORAL NIGHTLY
Status: DISCONTINUED | OUTPATIENT
Start: 2021-01-01 | End: 2021-01-01 | Stop reason: HOSPADM

## 2021-01-01 RX ORDER — AMLODIPINE BESYLATE 5 MG/1
5 TABLET ORAL EVERY MORNING
Status: DISCONTINUED | OUTPATIENT
Start: 2021-01-01 | End: 2021-01-01 | Stop reason: HOSPADM

## 2021-01-01 RX ORDER — ACETAMINOPHEN AND CODEINE PHOSPHATE 300; 30 MG/1; MG/1
2 TABLET ORAL EVERY 6 HOURS PRN
Status: DISCONTINUED | OUTPATIENT
Start: 2021-01-01 | End: 2021-01-01 | Stop reason: HOSPADM

## 2021-01-01 RX ORDER — SODIUM CHLORIDE 9 MG/ML
INJECTION, SOLUTION INTRAVENOUS
Status: DISCONTINUED | OUTPATIENT
Start: 2021-01-01 | End: 2021-01-01

## 2021-01-01 RX ORDER — ACETAMINOPHEN 325 MG/1
650 TABLET ORAL EVERY 8 HOURS PRN
Status: DISCONTINUED | OUTPATIENT
Start: 2021-01-01 | End: 2021-01-01 | Stop reason: HOSPADM

## 2021-01-01 RX ORDER — TALC
6 POWDER (GRAM) TOPICAL NIGHTLY PRN
Status: DISCONTINUED | OUTPATIENT
Start: 2021-01-01 | End: 2021-01-01

## 2021-01-01 RX ORDER — CEFEPIME HYDROCHLORIDE 2 G/1
2 INJECTION, POWDER, FOR SOLUTION INTRAVENOUS ONCE
Status: COMPLETED | OUTPATIENT
Start: 2021-01-01 | End: 2021-01-01

## 2021-01-01 RX ORDER — INSULIN DETEMIR 100 [IU]/ML
4 INJECTION, SOLUTION SUBCUTANEOUS 2 TIMES DAILY
Qty: 1 BOX | Refills: 3 | Status: CANCELLED
Start: 2021-01-01 | End: 2021-01-01

## 2021-01-01 RX ORDER — AMLODIPINE BESYLATE 5 MG/1
5 TABLET ORAL DAILY
Status: DISCONTINUED | OUTPATIENT
Start: 2021-01-01 | End: 2021-01-01

## 2021-01-01 RX ORDER — OXYCODONE HYDROCHLORIDE 5 MG/1
10 TABLET ORAL EVERY 6 HOURS PRN
Status: DISCONTINUED | OUTPATIENT
Start: 2021-01-01 | End: 2021-01-01

## 2021-01-01 RX ORDER — ACETAMINOPHEN 325 MG/1
650 TABLET ORAL EVERY 8 HOURS PRN
Status: DISCONTINUED | OUTPATIENT
Start: 2021-01-01 | End: 2021-01-01

## 2021-01-01 RX ORDER — AMLODIPINE BESYLATE 10 MG/1
10 TABLET ORAL EVERY MORNING
Status: DISCONTINUED | OUTPATIENT
Start: 2021-01-01 | End: 2021-01-01

## 2021-01-01 RX ORDER — INSULIN ASPART 100 [IU]/ML
0-5 INJECTION, SOLUTION INTRAVENOUS; SUBCUTANEOUS
Refills: 0 | Status: ON HOLD
Start: 2021-01-01 | End: 2021-01-01 | Stop reason: HOSPADM

## 2021-01-01 RX ORDER — METOPROLOL TARTRATE 1 MG/ML
5 INJECTION, SOLUTION INTRAVENOUS ONCE
Status: DISCONTINUED | OUTPATIENT
Start: 2021-01-01 | End: 2021-01-01 | Stop reason: HOSPADM

## 2021-01-01 RX ORDER — FAMOTIDINE 20 MG/1
20 TABLET, FILM COATED ORAL ONCE AS NEEDED
Status: COMPLETED | OUTPATIENT
Start: 2021-01-01 | End: 2021-01-01

## 2021-01-01 RX ORDER — MUPIROCIN 20 MG/G
OINTMENT TOPICAL 2 TIMES DAILY
Status: DISCONTINUED | OUTPATIENT
Start: 2021-01-01 | End: 2021-01-01 | Stop reason: HOSPADM

## 2021-01-01 RX ORDER — CITALOPRAM 20 MG/1
20 TABLET, FILM COATED ORAL NIGHTLY
Status: DISCONTINUED | OUTPATIENT
Start: 2021-01-01 | End: 2021-01-01 | Stop reason: HOSPADM

## 2021-01-01 RX ORDER — IPRATROPIUM BROMIDE AND ALBUTEROL SULFATE 2.5; .5 MG/3ML; MG/3ML
3 SOLUTION RESPIRATORY (INHALATION)
Status: DISCONTINUED | OUTPATIENT
Start: 2021-01-01 | End: 2021-01-01 | Stop reason: HOSPADM

## 2021-01-01 RX ORDER — CLOPIDOGREL BISULFATE 75 MG/1
75 TABLET ORAL DAILY
Qty: 60 TABLET | Refills: 0 | Status: ON HOLD | OUTPATIENT
Start: 2021-01-01 | End: 2022-01-01

## 2021-01-01 RX ORDER — PENICILLIN V POTASSIUM 500 MG/1
500 TABLET, FILM COATED ORAL 3 TIMES DAILY
Status: ON HOLD | COMMUNITY
Start: 2021-03-23 | End: 2021-01-01 | Stop reason: HOSPADM

## 2021-01-01 RX ORDER — BUPIVACAINE HYDROCHLORIDE 5 MG/ML
INJECTION, SOLUTION EPIDURAL; INTRACAUDAL
Status: DISCONTINUED | OUTPATIENT
Start: 2021-01-01 | End: 2021-01-01 | Stop reason: HOSPADM

## 2021-01-01 RX ORDER — HYDRALAZINE HYDROCHLORIDE 25 MG/1
25 TABLET, FILM COATED ORAL ONCE AS NEEDED
Status: DISCONTINUED | OUTPATIENT
Start: 2021-01-01 | End: 2021-01-01

## 2021-01-01 RX ORDER — SODIUM CHLORIDE 9 MG/ML
INJECTION, SOLUTION INTRAVENOUS
Status: COMPLETED | OUTPATIENT
Start: 2021-01-01 | End: 2021-01-01

## 2021-01-01 RX ORDER — INSULIN DETEMIR 100 [IU]/ML
10 INJECTION, SOLUTION SUBCUTANEOUS DAILY
Qty: 81 ML | Refills: 3 | Status: ON HOLD
Start: 2021-01-01 | End: 2021-01-01 | Stop reason: SDUPTHER

## 2021-01-01 RX ORDER — ENOXAPARIN SODIUM 100 MG/ML
40 INJECTION SUBCUTANEOUS EVERY 24 HOURS
Status: DISCONTINUED | OUTPATIENT
Start: 2021-01-01 | End: 2021-01-01

## 2021-01-01 RX ORDER — CEFEPIME HYDROCHLORIDE 1 G/50ML
1 INJECTION, SOLUTION INTRAVENOUS
Status: DISCONTINUED | OUTPATIENT
Start: 2021-01-01 | End: 2021-01-01 | Stop reason: HOSPADM

## 2021-01-01 RX ORDER — CARVEDILOL 3.12 MG/1
3.12 TABLET ORAL 2 TIMES DAILY
Status: DISCONTINUED | OUTPATIENT
Start: 2021-01-01 | End: 2021-01-01

## 2021-01-01 RX ORDER — LIDOCAINE HYDROCHLORIDE 20 MG/ML
INJECTION, SOLUTION EPIDURAL; INFILTRATION; INTRACAUDAL; PERINEURAL
Status: DISCONTINUED | OUTPATIENT
Start: 2021-01-01 | End: 2021-01-01

## 2021-01-01 RX ORDER — ACETAMINOPHEN 500 MG
500 TABLET ORAL ONCE
Status: COMPLETED | OUTPATIENT
Start: 2021-01-01 | End: 2021-01-01

## 2021-01-01 RX ORDER — LEVOFLOXACIN 500 MG/1
500 TABLET, FILM COATED ORAL
Qty: 4 TABLET | Refills: 0 | Status: SHIPPED | OUTPATIENT
Start: 2021-01-01 | End: 2021-01-01 | Stop reason: SDUPTHER

## 2021-01-01 RX ORDER — HYDRALAZINE HYDROCHLORIDE 25 MG/1
25 TABLET, FILM COATED ORAL 2 TIMES DAILY
Status: DISCONTINUED | OUTPATIENT
Start: 2021-01-01 | End: 2021-01-01

## 2021-01-01 RX ORDER — GABAPENTIN 400 MG/1
400 CAPSULE ORAL NIGHTLY
Status: DISCONTINUED | OUTPATIENT
Start: 2021-01-01 | End: 2021-01-01

## 2021-01-01 RX ORDER — IPRATROPIUM BROMIDE AND ALBUTEROL SULFATE 2.5; .5 MG/3ML; MG/3ML
3 SOLUTION RESPIRATORY (INHALATION) EVERY 12 HOURS
Status: DISCONTINUED | OUTPATIENT
Start: 2021-01-01 | End: 2021-01-01

## 2021-01-01 RX ORDER — METRONIDAZOLE 500 MG/100ML
500 INJECTION, SOLUTION INTRAVENOUS
Status: DISCONTINUED | OUTPATIENT
Start: 2021-01-01 | End: 2021-01-01

## 2021-01-01 RX ORDER — AMOXICILLIN AND CLAVULANATE POTASSIUM 500; 125 MG/1; MG/1
1 TABLET, FILM COATED ORAL DAILY
Status: COMPLETED | OUTPATIENT
Start: 2021-01-01 | End: 2021-01-01

## 2021-01-01 RX ORDER — HYDRALAZINE HYDROCHLORIDE 25 MG/1
25 TABLET, FILM COATED ORAL EVERY 8 HOURS PRN
Status: DISCONTINUED | OUTPATIENT
Start: 2021-01-01 | End: 2021-01-01 | Stop reason: HOSPADM

## 2021-01-01 RX ORDER — DOXYCYCLINE 100 MG/1
100 CAPSULE ORAL EVERY 12 HOURS
Qty: 20 CAPSULE | Refills: 0 | Status: ON HOLD | OUTPATIENT
Start: 2021-01-01 | End: 2021-01-01 | Stop reason: HOSPADM

## 2021-01-01 RX ORDER — ONDANSETRON 8 MG/1
8 TABLET, ORALLY DISINTEGRATING ORAL EVERY 8 HOURS PRN
Status: DISCONTINUED | OUTPATIENT
Start: 2021-01-01 | End: 2021-01-01 | Stop reason: HOSPADM

## 2021-01-01 RX ORDER — AMLODIPINE BESYLATE 5 MG/1
5 TABLET ORAL DAILY
Status: DISCONTINUED | OUTPATIENT
Start: 2021-01-01 | End: 2021-01-01 | Stop reason: HOSPADM

## 2021-01-01 RX ORDER — DICLOFENAC SODIUM 10 MG/G
2 GEL TOPICAL 2 TIMES DAILY
Status: DISCONTINUED | OUTPATIENT
Start: 2021-01-01 | End: 2021-01-01 | Stop reason: HOSPADM

## 2021-01-01 RX ORDER — OXYCODONE HYDROCHLORIDE 5 MG/1
5 TABLET ORAL EVERY 6 HOURS PRN
Status: DISCONTINUED | OUTPATIENT
Start: 2021-01-01 | End: 2021-01-01 | Stop reason: HOSPADM

## 2021-01-01 RX ORDER — ACETAMINOPHEN 325 MG/1
650 TABLET ORAL EVERY 4 HOURS PRN
Status: DISCONTINUED | OUTPATIENT
Start: 2021-01-01 | End: 2021-01-01 | Stop reason: HOSPADM

## 2021-01-01 RX ORDER — ALBUTEROL SULFATE 90 UG/1
2 AEROSOL, METERED RESPIRATORY (INHALATION) EVERY 6 HOURS PRN
Status: DISCONTINUED | OUTPATIENT
Start: 2021-01-01 | End: 2021-01-01

## 2021-01-01 RX ORDER — PROPOFOL 10 MG/ML
VIAL (ML) INTRAVENOUS CONTINUOUS PRN
Status: DISCONTINUED | OUTPATIENT
Start: 2021-01-01 | End: 2021-01-01

## 2021-01-01 RX ORDER — CARVEDILOL 3.12 MG/1
6.25 TABLET ORAL 2 TIMES DAILY
Status: DISCONTINUED | OUTPATIENT
Start: 2021-01-01 | End: 2021-01-01

## 2021-01-01 RX ORDER — HYDROMORPHONE HYDROCHLORIDE 1 MG/ML
0.2 INJECTION, SOLUTION INTRAMUSCULAR; INTRAVENOUS; SUBCUTANEOUS EVERY 4 HOURS PRN
Status: DISCONTINUED | OUTPATIENT
Start: 2021-01-01 | End: 2021-01-01 | Stop reason: HOSPADM

## 2021-01-01 RX ORDER — ACETAMINOPHEN AND CODEINE PHOSPHATE 300; 30 MG/1; MG/1
1-2 TABLET ORAL EVERY 6 HOURS PRN
Qty: 20 TABLET | Refills: 0 | Status: SHIPPED | OUTPATIENT
Start: 2021-01-01 | End: 2021-01-01 | Stop reason: HOSPADM

## 2021-01-01 RX ORDER — HEPARIN SODIUM 1000 [USP'U]/ML
INJECTION, SOLUTION INTRAVENOUS; SUBCUTANEOUS
Status: DISCONTINUED | OUTPATIENT
Start: 2021-01-01 | End: 2021-01-01

## 2021-01-01 RX ORDER — HYDROCODONE BITARTRATE AND ACETAMINOPHEN 5; 325 MG/1; MG/1
1 TABLET ORAL EVERY 4 HOURS PRN
Status: DISCONTINUED | OUTPATIENT
Start: 2021-01-01 | End: 2021-01-01

## 2021-01-01 RX ORDER — ONDANSETRON 2 MG/ML
INJECTION INTRAMUSCULAR; INTRAVENOUS
Status: DISCONTINUED | OUTPATIENT
Start: 2021-01-01 | End: 2021-01-01

## 2021-01-01 RX ORDER — BENZONATATE 100 MG/1
100 CAPSULE ORAL 3 TIMES DAILY PRN
Status: DISCONTINUED | OUTPATIENT
Start: 2021-01-01 | End: 2021-01-01 | Stop reason: HOSPADM

## 2021-01-01 RX ORDER — HEPARIN SODIUM 5000 [USP'U]/ML
7500 INJECTION, SOLUTION INTRAVENOUS; SUBCUTANEOUS EVERY 8 HOURS
Status: DISCONTINUED | OUTPATIENT
Start: 2021-01-01 | End: 2021-01-01

## 2021-01-01 RX ORDER — HYDRALAZINE HYDROCHLORIDE 25 MG/1
100 TABLET, FILM COATED ORAL EVERY 8 HOURS
Status: DISCONTINUED | OUTPATIENT
Start: 2021-01-01 | End: 2021-01-01 | Stop reason: HOSPADM

## 2021-01-01 RX ORDER — ACETAMINOPHEN 325 MG/1
650 TABLET ORAL EVERY 6 HOURS PRN
Status: DISCONTINUED | OUTPATIENT
Start: 2021-01-01 | End: 2021-01-01 | Stop reason: HOSPADM

## 2021-01-01 RX ORDER — SODIUM CHLORIDE 0.9 % (FLUSH) 0.9 %
3 SYRINGE (ML) INJECTION
Status: DISCONTINUED | OUTPATIENT
Start: 2021-01-01 | End: 2021-01-01 | Stop reason: HOSPADM

## 2021-01-01 RX ORDER — METHYLPREDNISOLONE SOD SUCC 125 MG
125 VIAL (EA) INJECTION ONCE
Status: CANCELLED | OUTPATIENT
Start: 2021-01-01

## 2021-01-01 RX ORDER — ACETAMINOPHEN 325 MG/1
650 TABLET ORAL EVERY 8 HOURS PRN
Refills: 0
Start: 2021-01-01 | End: 2021-01-01 | Stop reason: HOSPADM

## 2021-01-01 RX ORDER — BLOOD-GLUCOSE SENSOR
1 EACH MISCELLANEOUS
Qty: 3 EACH | Refills: 11 | Status: SHIPPED | OUTPATIENT
Start: 2021-01-01 | End: 2022-04-28

## 2021-01-01 RX ORDER — IPRATROPIUM BROMIDE AND ALBUTEROL SULFATE 2.5; .5 MG/3ML; MG/3ML
3 SOLUTION RESPIRATORY (INHALATION) EVERY 8 HOURS
Status: DISCONTINUED | OUTPATIENT
Start: 2021-01-01 | End: 2021-01-01 | Stop reason: HOSPADM

## 2021-01-01 RX ORDER — TALC
6 POWDER (GRAM) TOPICAL NIGHTLY PRN
Status: CANCELLED | OUTPATIENT
Start: 2021-01-01

## 2021-01-01 RX ORDER — CARVEDILOL 6.25 MG/1
6.25 TABLET ORAL ONCE
Status: COMPLETED | OUTPATIENT
Start: 2021-01-01 | End: 2021-01-01

## 2021-01-01 RX ORDER — INSULIN ASPART 100 [IU]/ML
3 INJECTION, SOLUTION INTRAVENOUS; SUBCUTANEOUS
Qty: 5.4 ML | Refills: 0
Start: 2021-01-01 | End: 2021-01-01

## 2021-01-01 RX ORDER — CARVEDILOL 3.12 MG/1
3.12 TABLET ORAL 2 TIMES DAILY
Qty: 60 TABLET | Refills: 11 | Status: CANCELLED | OUTPATIENT
Start: 2021-01-01 | End: 2022-07-28

## 2021-01-01 RX ORDER — POTASSIUM CHLORIDE 20 MEQ/1
20 TABLET, EXTENDED RELEASE ORAL ONCE
Status: DISCONTINUED | OUTPATIENT
Start: 2021-01-01 | End: 2021-01-01

## 2021-01-01 RX ORDER — BENZONATATE 100 MG/1
100 CAPSULE ORAL 3 TIMES DAILY PRN
Status: ON HOLD
Start: 2021-01-01 | End: 2022-01-01

## 2021-01-01 RX ORDER — IPRATROPIUM BROMIDE AND ALBUTEROL SULFATE 2.5; .5 MG/3ML; MG/3ML
3 SOLUTION RESPIRATORY (INHALATION)
Status: DISCONTINUED | OUTPATIENT
Start: 2021-01-01 | End: 2021-01-01

## 2021-01-01 RX ORDER — HYDROCODONE BITARTRATE AND ACETAMINOPHEN 5; 325 MG/1; MG/1
1 TABLET ORAL EVERY 6 HOURS PRN
Status: DISCONTINUED | OUTPATIENT
Start: 2021-01-01 | End: 2021-01-01

## 2021-01-01 RX ORDER — OXYCODONE HYDROCHLORIDE 10 MG/1
10 TABLET ORAL EVERY 6 HOURS PRN
Status: DISCONTINUED | OUTPATIENT
Start: 2021-01-01 | End: 2021-01-01

## 2021-01-01 RX ORDER — LANOLIN ALCOHOL/MO/W.PET/CERES
400 CREAM (GRAM) TOPICAL ONCE
Status: COMPLETED | OUTPATIENT
Start: 2021-01-01 | End: 2021-01-01

## 2021-01-01 RX ORDER — CITALOPRAM 10 MG/1
20 TABLET ORAL NIGHTLY
Status: DISCONTINUED | OUTPATIENT
Start: 2021-01-01 | End: 2021-01-01 | Stop reason: HOSPADM

## 2021-01-01 RX ORDER — PROPOFOL 10 MG/ML
VIAL (ML) INTRAVENOUS
Status: DISCONTINUED | OUTPATIENT
Start: 2021-01-01 | End: 2021-01-01

## 2021-01-01 RX ORDER — IPRATROPIUM BROMIDE AND ALBUTEROL SULFATE 2.5; .5 MG/3ML; MG/3ML
3 SOLUTION RESPIRATORY (INHALATION) EVERY 6 HOURS PRN
Status: DISCONTINUED | OUTPATIENT
Start: 2021-01-01 | End: 2021-01-01

## 2021-01-01 RX ORDER — MAGNESIUM SULFATE HEPTAHYDRATE 40 MG/ML
2 INJECTION, SOLUTION INTRAVENOUS ONCE
Status: DISCONTINUED | OUTPATIENT
Start: 2021-01-01 | End: 2021-01-01

## 2021-01-01 RX ORDER — AMLODIPINE BESYLATE 5 MG/1
10 TABLET ORAL DAILY
Qty: 180 TABLET | Refills: 3 | Status: ON HOLD | OUTPATIENT
Start: 2021-01-01 | End: 2021-01-01 | Stop reason: HOSPADM

## 2021-01-01 RX ORDER — LEVALBUTEROL INHALATION SOLUTION 0.63 MG/3ML
0.63 SOLUTION RESPIRATORY (INHALATION) ONCE
Status: DISCONTINUED | OUTPATIENT
Start: 2021-01-01 | End: 2021-01-01

## 2021-01-01 RX ORDER — CARVEDILOL 12.5 MG/1
12.5 TABLET ORAL 2 TIMES DAILY
Status: DISCONTINUED | OUTPATIENT
Start: 2021-01-01 | End: 2021-01-01 | Stop reason: HOSPADM

## 2021-01-01 RX ORDER — VANCOMYCIN HYDROCHLORIDE 1 G/20ML
INJECTION, POWDER, LYOPHILIZED, FOR SOLUTION INTRAVENOUS
Status: DISCONTINUED | OUTPATIENT
Start: 2021-01-01 | End: 2021-01-01 | Stop reason: HOSPADM

## 2021-01-01 RX ORDER — CARVEDILOL 25 MG/1
25 TABLET ORAL 2 TIMES DAILY
Status: DISCONTINUED | OUTPATIENT
Start: 2021-01-01 | End: 2021-01-01 | Stop reason: HOSPADM

## 2021-01-01 RX ORDER — CLOPIDOGREL BISULFATE 75 MG/1
75 TABLET ORAL DAILY
Status: DISCONTINUED | OUTPATIENT
Start: 2021-01-01 | End: 2021-01-01

## 2021-01-01 RX ORDER — AMOXICILLIN AND CLAVULANATE POTASSIUM 500; 125 MG/1; MG/1
1 TABLET, FILM COATED ORAL DAILY
Qty: 10 TABLET | Refills: 0 | Status: ON HOLD | OUTPATIENT
Start: 2021-01-01 | End: 2021-01-01 | Stop reason: HOSPADM

## 2021-01-01 RX ORDER — ACETAMINOPHEN 325 MG/1
650 TABLET ORAL EVERY 6 HOURS PRN
Refills: 0 | Status: ON HOLD
Start: 2021-01-01 | End: 2021-01-01 | Stop reason: HOSPADM

## 2021-01-01 RX ORDER — FLUTICASONE PROPIONATE 50 MCG
2 SPRAY, SUSPENSION (ML) NASAL 2 TIMES DAILY
Status: DISCONTINUED | OUTPATIENT
Start: 2021-01-01 | End: 2021-01-01 | Stop reason: HOSPADM

## 2021-01-01 RX ORDER — OXYCODONE AND ACETAMINOPHEN 5; 325 MG/1; MG/1
1 TABLET ORAL EVERY 6 HOURS PRN
Qty: 30 TABLET | Refills: 0 | Status: SHIPPED | OUTPATIENT
Start: 2021-01-01 | End: 2021-01-01

## 2021-01-01 RX ORDER — CARVEDILOL 6.25 MG/1
6.25 TABLET ORAL 2 TIMES DAILY
Status: DISCONTINUED | OUTPATIENT
Start: 2021-01-01 | End: 2021-01-01

## 2021-01-01 RX ORDER — MORPHINE SULFATE 4 MG/ML
4 INJECTION, SOLUTION INTRAMUSCULAR; INTRAVENOUS EVERY 4 HOURS PRN
Status: DISCONTINUED | OUTPATIENT
Start: 2021-01-01 | End: 2021-01-01

## 2021-01-01 RX ORDER — CEFEPIME HYDROCHLORIDE 2 G/1
2 INJECTION, POWDER, FOR SOLUTION INTRAVENOUS
Qty: 4 G | Refills: 0
Start: 2021-01-01 | End: 2021-01-01 | Stop reason: HOSPADM

## 2021-01-01 RX ORDER — HEPARIN SODIUM,PORCINE/D5W 25000/250
0-40 INTRAVENOUS SOLUTION INTRAVENOUS CONTINUOUS
Status: DISCONTINUED | OUTPATIENT
Start: 2021-01-01 | End: 2021-01-01

## 2021-01-01 RX ORDER — FAMOTIDINE 20 MG/1
20 TABLET, FILM COATED ORAL ONCE
Status: COMPLETED | OUTPATIENT
Start: 2021-01-01 | End: 2021-01-01

## 2021-01-01 RX ORDER — HYDRALAZINE HYDROCHLORIDE 25 MG/1
100 TABLET, FILM COATED ORAL 3 TIMES DAILY
Status: DISCONTINUED | OUTPATIENT
Start: 2021-01-01 | End: 2021-01-01

## 2021-01-01 RX ORDER — METOCLOPRAMIDE HYDROCHLORIDE 5 MG/ML
5 INJECTION INTRAMUSCULAR; INTRAVENOUS EVERY 8 HOURS PRN
Status: DISCONTINUED | OUTPATIENT
Start: 2021-01-01 | End: 2021-01-01 | Stop reason: HOSPADM

## 2021-01-01 RX ORDER — CARVEDILOL 12.5 MG/1
12.5 TABLET ORAL 2 TIMES DAILY
Qty: 60 TABLET | Refills: 0
Start: 2021-01-01 | End: 2021-01-01

## 2021-01-01 RX ORDER — AMLODIPINE BESYLATE 5 MG/1
5 TABLET ORAL DAILY
Qty: 30 TABLET | Refills: 11
Start: 2021-01-01 | End: 2021-01-01 | Stop reason: SDUPTHER

## 2021-01-01 RX ORDER — CARVEDILOL 25 MG/1
25 TABLET ORAL 2 TIMES DAILY
Status: DISCONTINUED | OUTPATIENT
Start: 2021-01-01 | End: 2021-01-01

## 2021-01-01 RX ORDER — INSULIN ASPART 100 [IU]/ML
3 INJECTION, SOLUTION INTRAVENOUS; SUBCUTANEOUS 3 TIMES DAILY
Qty: 5.4 ML | Refills: 0
Start: 2021-01-01 | End: 2021-01-01

## 2021-01-01 RX ORDER — INSULIN ASPART 100 [IU]/ML
0-5 INJECTION, SOLUTION INTRAVENOUS; SUBCUTANEOUS
Status: DISCONTINUED | OUTPATIENT
Start: 2021-01-01 | End: 2021-01-01

## 2021-01-01 RX ORDER — SODIUM CHLORIDE 0.9 % (FLUSH) 0.9 %
10 SYRINGE (ML) INJECTION EVERY 8 HOURS
Status: DISCONTINUED | OUTPATIENT
Start: 2021-01-01 | End: 2021-01-01 | Stop reason: HOSPADM

## 2021-01-01 RX ORDER — ALBUTEROL SULFATE 90 UG/1
2 AEROSOL, METERED RESPIRATORY (INHALATION) EVERY 6 HOURS PRN
Status: DISCONTINUED | OUTPATIENT
Start: 2021-01-01 | End: 2021-01-01 | Stop reason: HOSPADM

## 2021-01-01 RX ORDER — AMOXICILLIN 250 MG
1 CAPSULE ORAL 2 TIMES DAILY PRN
Status: DISCONTINUED | OUTPATIENT
Start: 2021-01-01 | End: 2021-01-01 | Stop reason: HOSPADM

## 2021-01-01 RX ORDER — POLYETHYLENE GLYCOL 3350 17 G/17G
17 POWDER, FOR SOLUTION ORAL DAILY
Status: DISCONTINUED | OUTPATIENT
Start: 2021-01-01 | End: 2021-01-01

## 2021-01-01 RX ORDER — CARVEDILOL 12.5 MG/1
6.25 TABLET ORAL 2 TIMES DAILY
Qty: 60 TABLET | Refills: 0 | Status: ON HOLD
Start: 2021-01-01 | End: 2021-01-01 | Stop reason: HOSPADM

## 2021-01-01 RX ORDER — INSULIN ASPART 100 [IU]/ML
1-10 INJECTION, SOLUTION INTRAVENOUS; SUBCUTANEOUS
Status: DISCONTINUED | OUTPATIENT
Start: 2021-01-01 | End: 2021-01-01 | Stop reason: HOSPADM

## 2021-01-01 RX ORDER — DOXYCYCLINE HYCLATE 100 MG
100 TABLET ORAL EVERY 12 HOURS
Status: DISPENSED | OUTPATIENT
Start: 2021-01-01 | End: 2021-01-01

## 2021-01-01 RX ORDER — AMLODIPINE BESYLATE 5 MG/1
10 TABLET ORAL DAILY
Status: DISCONTINUED | OUTPATIENT
Start: 2021-01-01 | End: 2021-01-01

## 2021-01-01 RX ORDER — ACETAMINOPHEN 325 MG/1
650 TABLET ORAL EVERY 6 HOURS PRN
Status: DISCONTINUED | OUTPATIENT
Start: 2021-01-01 | End: 2021-01-01

## 2021-01-01 RX ORDER — INSULIN DETEMIR 100 [IU]/ML
10 INJECTION, SOLUTION SUBCUTANEOUS NIGHTLY
Qty: 81 ML | Refills: 3 | Status: ON HOLD
Start: 2021-01-01 | End: 2021-01-01 | Stop reason: HOSPADM

## 2021-01-01 RX ORDER — PROCHLORPERAZINE EDISYLATE 5 MG/ML
5 INJECTION INTRAMUSCULAR; INTRAVENOUS EVERY 30 MIN PRN
Status: DISCONTINUED | OUTPATIENT
Start: 2021-01-01 | End: 2021-01-01 | Stop reason: HOSPADM

## 2021-01-01 RX ORDER — OXYCODONE HYDROCHLORIDE 10 MG/1
10 TABLET ORAL EVERY 6 HOURS PRN
Status: DISCONTINUED | OUTPATIENT
Start: 2021-01-01 | End: 2021-01-01 | Stop reason: HOSPADM

## 2021-01-01 RX ORDER — LEVOFLOXACIN 500 MG/1
500 TABLET, FILM COATED ORAL
Qty: 4 TABLET | Refills: 0 | Status: SHIPPED | OUTPATIENT
Start: 2021-01-01 | End: 2021-01-01

## 2021-01-01 RX ORDER — FENTANYL CITRATE 50 UG/ML
25 INJECTION, SOLUTION INTRAMUSCULAR; INTRAVENOUS
Status: COMPLETED | OUTPATIENT
Start: 2021-01-01 | End: 2021-01-01

## 2021-01-01 RX ORDER — INSULIN ASPART 100 [IU]/ML
0-5 INJECTION, SOLUTION INTRAVENOUS; SUBCUTANEOUS
Refills: 0
Start: 2021-01-01 | End: 2021-01-01 | Stop reason: HOSPADM

## 2021-01-01 RX ORDER — ACETAMINOPHEN AND CODEINE PHOSPHATE 300; 30 MG/1; MG/1
1 TABLET ORAL EVERY 8 HOURS PRN
Qty: 30 TABLET | Refills: 0 | Status: SHIPPED | OUTPATIENT
Start: 2021-01-01 | End: 2021-01-01 | Stop reason: SDUPTHER

## 2021-01-01 RX ORDER — FLASH GLUCOSE SENSOR
1 KIT MISCELLANEOUS
Qty: 2 KIT | Refills: 11 | Status: SHIPPED | OUTPATIENT
Start: 2021-01-01

## 2021-01-01 RX ORDER — INSULIN DETEMIR 100 [IU]/ML
45 INJECTION, SOLUTION SUBCUTANEOUS 2 TIMES DAILY
Qty: 81 ML | Refills: 3 | Status: ON HOLD | OUTPATIENT
Start: 2021-01-01 | End: 2021-01-01 | Stop reason: SDUPTHER

## 2021-01-01 RX ADMIN — LETROZOLE 2.5 MG: 2.5 TABLET ORAL at 10:05

## 2021-01-01 RX ADMIN — DEXMEDETOMIDINE HYDROCHLORIDE 4 MCG: 100 INJECTION, SOLUTION INTRAVENOUS at 01:05

## 2021-01-01 RX ADMIN — FLUTICASONE PROPIONATE 100 MCG: 50 SPRAY, METERED NASAL at 05:06

## 2021-01-01 RX ADMIN — DOXYCYCLINE HYCLATE 100 MG: 100 TABLET, COATED ORAL at 08:05

## 2021-01-01 RX ADMIN — ACETAMINOPHEN AND CODEINE PHOSPHATE 2 TABLET: 300; 30 TABLET ORAL at 06:06

## 2021-01-01 RX ADMIN — GABAPENTIN 300 MG: 300 CAPSULE ORAL at 09:07

## 2021-01-01 RX ADMIN — HEPARIN SODIUM 7500 UNITS: 5000 INJECTION INTRAVENOUS; SUBCUTANEOUS at 09:06

## 2021-01-01 RX ADMIN — CITALOPRAM HYDROBROMIDE 20 MG: 10 TABLET ORAL at 09:06

## 2021-01-01 RX ADMIN — VANCOMYCIN HYDROCHLORIDE 750 MG: 750 INJECTION, POWDER, LYOPHILIZED, FOR SOLUTION INTRAVENOUS at 03:05

## 2021-01-01 RX ADMIN — INSULIN ASPART 2 UNITS: 100 INJECTION, SOLUTION INTRAVENOUS; SUBCUTANEOUS at 04:06

## 2021-01-01 RX ADMIN — CARVEDILOL 25 MG: 25 TABLET, FILM COATED ORAL at 08:05

## 2021-01-01 RX ADMIN — CITALOPRAM HYDROBROMIDE 20 MG: 10 TABLET ORAL at 08:06

## 2021-01-01 RX ADMIN — ASPIRIN 81 MG: 81 TABLET, COATED ORAL at 07:07

## 2021-01-01 RX ADMIN — HYDRALAZINE HYDROCHLORIDE 25 MG: 25 TABLET ORAL at 08:06

## 2021-01-01 RX ADMIN — CITALOPRAM HYDROBROMIDE 20 MG: 10 TABLET ORAL at 09:05

## 2021-01-01 RX ADMIN — EPOETIN ALFA-EPBX 6000 UNITS: 10000 INJECTION, SOLUTION INTRAVENOUS; SUBCUTANEOUS at 10:07

## 2021-01-01 RX ADMIN — HYDRALAZINE HYDROCHLORIDE 25 MG: 25 TABLET ORAL at 10:06

## 2021-01-01 RX ADMIN — HEPARIN SODIUM 7500 UNITS: 5000 INJECTION INTRAVENOUS; SUBCUTANEOUS at 08:05

## 2021-01-01 RX ADMIN — AMLODIPINE BESYLATE 5 MG: 5 TABLET ORAL at 08:08

## 2021-01-01 RX ADMIN — CARVEDILOL 25 MG: 25 TABLET, FILM COATED ORAL at 09:05

## 2021-01-01 RX ADMIN — HEPARIN SODIUM 5000 UNITS: 5000 INJECTION INTRAVENOUS; SUBCUTANEOUS at 06:07

## 2021-01-01 RX ADMIN — HEPARIN SODIUM 7500 UNITS: 5000 INJECTION INTRAVENOUS; SUBCUTANEOUS at 05:05

## 2021-01-01 RX ADMIN — CARVEDILOL 6.25 MG: 6.25 TABLET, FILM COATED ORAL at 10:07

## 2021-01-01 RX ADMIN — INSULIN DETEMIR 10 UNITS: 100 INJECTION, SOLUTION SUBCUTANEOUS at 08:06

## 2021-01-01 RX ADMIN — CLOPIDOGREL 75 MG: 75 TABLET, FILM COATED ORAL at 09:11

## 2021-01-01 RX ADMIN — HEPARIN SODIUM 7500 UNITS: 5000 INJECTION INTRAVENOUS; SUBCUTANEOUS at 09:05

## 2021-01-01 RX ADMIN — DICLOFENAC SODIUM 2 G: 10 GEL TOPICAL at 09:06

## 2021-01-01 RX ADMIN — CLOPIDOGREL 75 MG: 75 TABLET, FILM COATED ORAL at 08:05

## 2021-01-01 RX ADMIN — GABAPENTIN 400 MG: 400 CAPSULE ORAL at 11:06

## 2021-01-01 RX ADMIN — HYDRALAZINE HYDROCHLORIDE 25 MG: 25 TABLET ORAL at 09:06

## 2021-01-01 RX ADMIN — EPOETIN ALFA-EPBX 5400 UNITS: 10000 INJECTION, SOLUTION INTRAVENOUS; SUBCUTANEOUS at 09:11

## 2021-01-01 RX ADMIN — INSULIN DETEMIR 4 UNITS: 100 INJECTION, SOLUTION SUBCUTANEOUS at 09:07

## 2021-01-01 RX ADMIN — DOCUSATE SODIUM 50 MG AND SENNOSIDES 8.6 MG 1 TABLET: 8.6; 5 TABLET, FILM COATED ORAL at 01:07

## 2021-01-01 RX ADMIN — LETROZOLE 2.5 MG: 2.5 TABLET ORAL at 09:05

## 2021-01-01 RX ADMIN — CITALOPRAM HYDROBROMIDE 20 MG: 10 TABLET ORAL at 10:05

## 2021-01-01 RX ADMIN — OXYCODONE 5 MG: 5 TABLET ORAL at 12:07

## 2021-01-01 RX ADMIN — OXYCODONE 5 MG: 5 TABLET ORAL at 09:07

## 2021-01-01 RX ADMIN — CARVEDILOL 25 MG: 25 TABLET, FILM COATED ORAL at 04:05

## 2021-01-01 RX ADMIN — CITALOPRAM HYDROBROMIDE 20 MG: 10 TABLET ORAL at 08:05

## 2021-01-01 RX ADMIN — MUPIROCIN: 20 OINTMENT TOPICAL at 10:07

## 2021-01-01 RX ADMIN — CITALOPRAM HYDROBROMIDE 20 MG: 20 TABLET ORAL at 09:07

## 2021-01-01 RX ADMIN — FENTANYL CITRATE 25 MCG: 50 INJECTION, SOLUTION INTRAMUSCULAR; INTRAVENOUS at 04:07

## 2021-01-01 RX ADMIN — ATORVASTATIN CALCIUM 40 MG: 20 TABLET, FILM COATED ORAL at 09:07

## 2021-01-01 RX ADMIN — ASPIRIN 81 MG: 81 TABLET, COATED ORAL at 08:06

## 2021-01-01 RX ADMIN — ATORVASTATIN CALCIUM 40 MG: 20 TABLET, FILM COATED ORAL at 08:08

## 2021-01-01 RX ADMIN — ATORVASTATIN CALCIUM 40 MG: 20 TABLET, FILM COATED ORAL at 01:05

## 2021-01-01 RX ADMIN — VANCOMYCIN HYDROCHLORIDE 1000 MG: 1 INJECTION, POWDER, LYOPHILIZED, FOR SOLUTION INTRAVENOUS at 02:05

## 2021-01-01 RX ADMIN — LETROZOLE 2.5 MG: 2.5 TABLET ORAL at 09:07

## 2021-01-01 RX ADMIN — VANCOMYCIN HYDROCHLORIDE 2000 MG: 10 INJECTION, POWDER, LYOPHILIZED, FOR SOLUTION INTRAVENOUS at 02:11

## 2021-01-01 RX ADMIN — CLOPIDOGREL BISULFATE 75 MG: 75 TABLET, FILM COATED ORAL at 08:07

## 2021-01-01 RX ADMIN — CEFEPIME HYDROCHLORIDE 1 G: 1 INJECTION, SOLUTION INTRAVENOUS at 01:11

## 2021-01-01 RX ADMIN — HEPARIN SODIUM 7500 UNITS: 5000 INJECTION INTRAVENOUS; SUBCUTANEOUS at 10:07

## 2021-01-01 RX ADMIN — CARVEDILOL 25 MG: 25 TABLET, FILM COATED ORAL at 10:05

## 2021-01-01 RX ADMIN — ASPIRIN 81 MG: 81 TABLET, COATED ORAL at 01:05

## 2021-01-01 RX ADMIN — INSULIN DETEMIR 4 UNITS: 100 INJECTION, SOLUTION SUBCUTANEOUS at 08:07

## 2021-01-01 RX ADMIN — ACETAMINOPHEN 1000 MG: 500 TABLET ORAL at 01:07

## 2021-01-01 RX ADMIN — AMLODIPINE BESYLATE 10 MG: 10 TABLET ORAL at 08:05

## 2021-01-01 RX ADMIN — AMLODIPINE BESYLATE 10 MG: 10 TABLET ORAL at 07:05

## 2021-01-01 RX ADMIN — ONDANSETRON 8 MG: 8 TABLET, ORALLY DISINTEGRATING ORAL at 01:06

## 2021-01-01 RX ADMIN — OXYCODONE 5 MG: 5 TABLET ORAL at 05:07

## 2021-01-01 RX ADMIN — OXYCODONE 5 MG: 5 TABLET ORAL at 08:07

## 2021-01-01 RX ADMIN — CEFEPIME 1 G: 1 INJECTION, POWDER, FOR SOLUTION INTRAMUSCULAR; INTRAVENOUS at 03:07

## 2021-01-01 RX ADMIN — GABAPENTIN 400 MG: 400 CAPSULE ORAL at 10:06

## 2021-01-01 RX ADMIN — CARVEDILOL 25 MG: 25 TABLET, FILM COATED ORAL at 09:07

## 2021-01-01 RX ADMIN — IRON SUCROSE 100 MG: 20 INJECTION, SOLUTION INTRAVENOUS at 01:05

## 2021-01-01 RX ADMIN — CLOPIDOGREL BISULFATE 75 MG: 75 TABLET, FILM COATED ORAL at 11:07

## 2021-01-01 RX ADMIN — HYDRALAZINE HYDROCHLORIDE 25 MG: 25 TABLET, FILM COATED ORAL at 08:05

## 2021-01-01 RX ADMIN — INSULIN DETEMIR 10 UNITS: 100 INJECTION, SOLUTION SUBCUTANEOUS at 10:06

## 2021-01-01 RX ADMIN — ATORVASTATIN CALCIUM 40 MG: 20 TABLET, FILM COATED ORAL at 02:07

## 2021-01-01 RX ADMIN — Medication 10 ML: at 06:11

## 2021-01-01 RX ADMIN — VANCOMYCIN HYDROCHLORIDE 2000 MG: 10 INJECTION, POWDER, LYOPHILIZED, FOR SOLUTION INTRAVENOUS at 05:06

## 2021-01-01 RX ADMIN — VANCOMYCIN HYDROCHLORIDE 750 MG: 750 INJECTION, POWDER, LYOPHILIZED, FOR SOLUTION INTRAVENOUS at 03:07

## 2021-01-01 RX ADMIN — CLOPIDOGREL 75 MG: 75 TABLET, FILM COATED ORAL at 09:06

## 2021-01-01 RX ADMIN — HEPARIN SODIUM 5000 UNITS: 5000 INJECTION INTRAVENOUS; SUBCUTANEOUS at 05:07

## 2021-01-01 RX ADMIN — INSULIN ASPART 2 UNITS: 100 INJECTION, SOLUTION INTRAVENOUS; SUBCUTANEOUS at 08:08

## 2021-01-01 RX ADMIN — CARVEDILOL 6.25 MG: 6.25 TABLET, FILM COATED ORAL at 07:07

## 2021-01-01 RX ADMIN — CARVEDILOL 6.25 MG: 6.25 TABLET, FILM COATED ORAL at 09:11

## 2021-01-01 RX ADMIN — CARVEDILOL 25 MG: 25 TABLET, FILM COATED ORAL at 10:06

## 2021-01-01 RX ADMIN — FAMOTIDINE 20 MG: 20 TABLET, FILM COATED ORAL at 10:08

## 2021-01-01 RX ADMIN — SODIUM CHLORIDE: 0.9 INJECTION, SOLUTION INTRAVENOUS at 09:05

## 2021-01-01 RX ADMIN — MUPIROCIN: 20 OINTMENT TOPICAL at 08:11

## 2021-01-01 RX ADMIN — HEPARIN SODIUM 7500 UNITS: 5000 INJECTION INTRAVENOUS; SUBCUTANEOUS at 09:07

## 2021-01-01 RX ADMIN — HYDROCODONE BITARTRATE AND ACETAMINOPHEN 1 TABLET: 5; 325 TABLET ORAL at 08:05

## 2021-01-01 RX ADMIN — ATORVASTATIN CALCIUM 40 MG: 20 TABLET, FILM COATED ORAL at 08:05

## 2021-01-01 RX ADMIN — SODIUM CHLORIDE: 0.9 INJECTION, SOLUTION INTRAVENOUS at 08:07

## 2021-01-01 RX ADMIN — HEPARIN SODIUM 7500 UNITS: 5000 INJECTION INTRAVENOUS; SUBCUTANEOUS at 01:05

## 2021-01-01 RX ADMIN — AMLODIPINE BESYLATE 5 MG: 5 TABLET ORAL at 06:06

## 2021-01-01 RX ADMIN — FLUTICASONE PROPIONATE 100 MCG: 50 SPRAY, METERED NASAL at 10:06

## 2021-01-01 RX ADMIN — GUAIFENESIN 600 MG: 600 TABLET, EXTENDED RELEASE ORAL at 09:06

## 2021-01-01 RX ADMIN — GABAPENTIN 400 MG: 400 CAPSULE ORAL at 09:06

## 2021-01-01 RX ADMIN — MUPIROCIN: 20 OINTMENT TOPICAL at 09:08

## 2021-01-01 RX ADMIN — GABAPENTIN 300 MG: 300 CAPSULE ORAL at 10:07

## 2021-01-01 RX ADMIN — INSULIN DETEMIR 10 UNITS: 100 INJECTION, SOLUTION SUBCUTANEOUS at 09:06

## 2021-01-01 RX ADMIN — INSULIN ASPART 3 UNITS: 100 INJECTION, SOLUTION INTRAVENOUS; SUBCUTANEOUS at 12:07

## 2021-01-01 RX ADMIN — CARVEDILOL 25 MG: 25 TABLET, FILM COATED ORAL at 05:05

## 2021-01-01 RX ADMIN — DOXYCYCLINE HYCLATE 100 MG: 100 TABLET, COATED ORAL at 01:05

## 2021-01-01 RX ADMIN — DOCUSATE SODIUM AND SENNOSIDES 1 TABLET: 8.6; 5 TABLET, FILM COATED ORAL at 08:06

## 2021-01-01 RX ADMIN — AMLODIPINE BESYLATE 5 MG: 5 TABLET ORAL at 04:07

## 2021-01-01 RX ADMIN — IPRATROPIUM BROMIDE AND ALBUTEROL SULFATE 3 ML: .5; 2.5 SOLUTION RESPIRATORY (INHALATION) at 12:06

## 2021-01-01 RX ADMIN — CEFTRIAXONE 2 G: 2 INJECTION, SOLUTION INTRAVENOUS at 03:07

## 2021-01-01 RX ADMIN — FAMOTIDINE 20 MG: 20 TABLET, FILM COATED ORAL at 12:07

## 2021-01-01 RX ADMIN — LETROZOLE 2.5 MG: 2.5 TABLET ORAL at 09:06

## 2021-01-01 RX ADMIN — DOCUSATE SODIUM 50 MG AND SENNOSIDES 8.6 MG 1 TABLET: 8.6; 5 TABLET, FILM COATED ORAL at 09:07

## 2021-01-01 RX ADMIN — ASPIRIN 81 MG: 81 TABLET, COATED ORAL at 08:05

## 2021-01-01 RX ADMIN — DICLOFENAC SODIUM 2 G: 10 GEL TOPICAL at 08:05

## 2021-01-01 RX ADMIN — INSULIN DETEMIR 10 UNITS: 100 INJECTION, SOLUTION SUBCUTANEOUS at 08:05

## 2021-01-01 RX ADMIN — HYDRALAZINE HYDROCHLORIDE 25 MG: 25 TABLET ORAL at 09:05

## 2021-01-01 RX ADMIN — GUAIFENESIN 600 MG: 600 TABLET, EXTENDED RELEASE ORAL at 10:06

## 2021-01-01 RX ADMIN — GABAPENTIN 300 MG: 300 CAPSULE ORAL at 08:07

## 2021-01-01 RX ADMIN — ATORVASTATIN CALCIUM 40 MG: 20 TABLET, FILM COATED ORAL at 03:07

## 2021-01-01 RX ADMIN — CEFEPIME 3 G: 2 INJECTION, POWDER, FOR SOLUTION INTRAVENOUS at 08:07

## 2021-01-01 RX ADMIN — GUAIFENESIN 600 MG: 600 TABLET, EXTENDED RELEASE ORAL at 08:06

## 2021-01-01 RX ADMIN — CARVEDILOL 25 MG: 25 TABLET, FILM COATED ORAL at 09:06

## 2021-01-01 RX ADMIN — GABAPENTIN 400 MG: 400 CAPSULE ORAL at 08:05

## 2021-01-01 RX ADMIN — CARVEDILOL 6.25 MG: 6.25 TABLET, FILM COATED ORAL at 09:08

## 2021-01-01 RX ADMIN — CARVEDILOL 25 MG: 25 TABLET, FILM COATED ORAL at 01:05

## 2021-01-01 RX ADMIN — HEPARIN SODIUM 7500 UNITS: 5000 INJECTION INTRAVENOUS; SUBCUTANEOUS at 05:07

## 2021-01-01 RX ADMIN — METRONIDAZOLE 500 MG: 500 INJECTION, SOLUTION INTRAVENOUS at 02:05

## 2021-01-01 RX ADMIN — ACETAMINOPHEN AND CODEINE PHOSPHATE 2 TABLET: 300; 30 TABLET ORAL at 06:05

## 2021-01-01 RX ADMIN — HEPARIN SODIUM 7500 UNITS: 5000 INJECTION INTRAVENOUS; SUBCUTANEOUS at 06:05

## 2021-01-01 RX ADMIN — METRONIDAZOLE 500 MG: 500 INJECTION, SOLUTION INTRAVENOUS at 05:05

## 2021-01-01 RX ADMIN — FENTANYL CITRATE 50 MCG: 50 INJECTION, SOLUTION INTRAMUSCULAR; INTRAVENOUS at 11:06

## 2021-01-01 RX ADMIN — ACETAMINOPHEN 650 MG: 325 TABLET ORAL at 06:05

## 2021-01-01 RX ADMIN — LETROZOLE 2.5 MG: 2.5 TABLET ORAL at 08:07

## 2021-01-01 RX ADMIN — CARVEDILOL 6.25 MG: 6.25 TABLET, FILM COATED ORAL at 03:11

## 2021-01-01 RX ADMIN — GABAPENTIN 400 MG: 400 CAPSULE ORAL at 08:06

## 2021-01-01 RX ADMIN — HEPARIN SODIUM 7500 UNITS: 5000 INJECTION INTRAVENOUS; SUBCUTANEOUS at 10:06

## 2021-01-01 RX ADMIN — LIDOCAINE HYDROCHLORIDE 20 MG: 20 INJECTION, SOLUTION EPIDURAL; INFILTRATION; INTRACAUDAL at 01:07

## 2021-01-01 RX ADMIN — ASPIRIN 81 MG: 81 TABLET, COATED ORAL at 12:06

## 2021-01-01 RX ADMIN — HEPARIN SODIUM 3000 UNITS: 1000 INJECTION, SOLUTION INTRAVENOUS; SUBCUTANEOUS at 12:05

## 2021-01-01 RX ADMIN — ACETAMINOPHEN AND CODEINE PHOSPHATE 2 TABLET: 300; 30 TABLET ORAL at 08:06

## 2021-01-01 RX ADMIN — ASPIRIN 81 MG: 81 TABLET, COATED ORAL at 08:07

## 2021-01-01 RX ADMIN — SODIUM CHLORIDE: 0.9 INJECTION, SOLUTION INTRAVENOUS at 08:05

## 2021-01-01 RX ADMIN — ACETAMINOPHEN 650 MG: 325 TABLET ORAL at 11:05

## 2021-01-01 RX ADMIN — ATORVASTATIN CALCIUM 40 MG: 20 TABLET, FILM COATED ORAL at 08:06

## 2021-01-01 RX ADMIN — HEPARIN SODIUM 7500 UNITS: 5000 INJECTION INTRAVENOUS; SUBCUTANEOUS at 05:06

## 2021-01-01 RX ADMIN — OXYCODONE HYDROCHLORIDE 10 MG: 10 TABLET ORAL at 08:07

## 2021-01-01 RX ADMIN — IRON SUCROSE 100 MG: 20 INJECTION, SOLUTION INTRAVENOUS at 06:06

## 2021-01-01 RX ADMIN — SEVELAMER CARBONATE 1600 MG: 800 TABLET, FILM COATED ORAL at 08:07

## 2021-01-01 RX ADMIN — INSULIN ASPART 3 UNITS: 100 INJECTION, SOLUTION INTRAVENOUS; SUBCUTANEOUS at 01:07

## 2021-01-01 RX ADMIN — AMLODIPINE BESYLATE 5 MG: 5 TABLET ORAL at 01:07

## 2021-01-01 RX ADMIN — IPRATROPIUM BROMIDE AND ALBUTEROL SULFATE 3 ML: .5; 2.5 SOLUTION RESPIRATORY (INHALATION) at 11:07

## 2021-01-01 RX ADMIN — DOXYCYCLINE HYCLATE 100 MG: 100 TABLET, COATED ORAL at 04:05

## 2021-01-01 RX ADMIN — DOCUSATE SODIUM 50 MG AND SENNOSIDES 8.6 MG 1 TABLET: 8.6; 5 TABLET, FILM COATED ORAL at 07:07

## 2021-01-01 RX ADMIN — EPOETIN ALFA-EPBX 6040 UNITS: 4000 INJECTION, SOLUTION INTRAVENOUS; SUBCUTANEOUS at 11:06

## 2021-01-01 RX ADMIN — BUPIVACAINE HYDROCHLORIDE AND EPINEPHRINE 30 ML: 5; 5 INJECTION, SOLUTION EPIDURAL; INTRACAUDAL; PERINEURAL at 12:06

## 2021-01-01 RX ADMIN — DOXYCYCLINE HYCLATE 100 MG: 100 TABLET, COATED ORAL at 05:05

## 2021-01-01 RX ADMIN — INSULIN DETEMIR 4 UNITS: 100 INJECTION, SOLUTION SUBCUTANEOUS at 10:07

## 2021-01-01 RX ADMIN — ATORVASTATIN CALCIUM 40 MG: 20 TABLET, FILM COATED ORAL at 07:07

## 2021-01-01 RX ADMIN — LETROZOLE 2.5 MG: 2.5 TABLET ORAL at 10:07

## 2021-01-01 RX ADMIN — CITALOPRAM HYDROBROMIDE 20 MG: 10 TABLET ORAL at 10:06

## 2021-01-01 RX ADMIN — INSULIN ASPART 1 UNITS: 100 INJECTION, SOLUTION INTRAVENOUS; SUBCUTANEOUS at 10:07

## 2021-01-01 RX ADMIN — AMLODIPINE BESYLATE 5 MG: 5 TABLET ORAL at 12:06

## 2021-01-01 RX ADMIN — LIDOCAINE HYDROCHLORIDE 20 MG: 20 INJECTION, SOLUTION EPIDURAL; INFILTRATION; INTRACAUDAL at 12:06

## 2021-01-01 RX ADMIN — HEPARIN SODIUM 7500 UNITS: 5000 INJECTION INTRAVENOUS; SUBCUTANEOUS at 02:07

## 2021-01-01 RX ADMIN — ACETAMINOPHEN AND CODEINE PHOSPHATE 2 TABLET: 300; 30 TABLET ORAL at 08:05

## 2021-01-01 RX ADMIN — DOCUSATE SODIUM 50 MG AND SENNOSIDES 8.6 MG 1 TABLET: 8.6; 5 TABLET, FILM COATED ORAL at 08:07

## 2021-01-01 RX ADMIN — PIPERACILLIN SODIUM AND TAZOBACTAM SODIUM 4.5 G: 4; .5 INJECTION, POWDER, FOR SOLUTION INTRAVENOUS at 12:07

## 2021-01-01 RX ADMIN — CLOPIDOGREL 75 MG: 75 TABLET, FILM COATED ORAL at 08:06

## 2021-01-01 RX ADMIN — GABAPENTIN 400 MG: 400 CAPSULE ORAL at 09:05

## 2021-01-01 RX ADMIN — METRONIDAZOLE 500 MG: 500 INJECTION, SOLUTION INTRAVENOUS at 01:05

## 2021-01-01 RX ADMIN — IPRATROPIUM BROMIDE AND ALBUTEROL SULFATE 3 ML: .5; 2.5 SOLUTION RESPIRATORY (INHALATION) at 01:05

## 2021-01-01 RX ADMIN — INSULIN ASPART 3 UNITS: 100 INJECTION, SOLUTION INTRAVENOUS; SUBCUTANEOUS at 09:07

## 2021-01-01 RX ADMIN — LEVALBUTEROL 1.25 MG: 1.25 SOLUTION, CONCENTRATE RESPIRATORY (INHALATION) at 03:07

## 2021-01-01 RX ADMIN — CITALOPRAM HYDROBROMIDE 20 MG: 20 TABLET ORAL at 10:07

## 2021-01-01 RX ADMIN — INSULIN ASPART 3 UNITS: 100 INJECTION, SOLUTION INTRAVENOUS; SUBCUTANEOUS at 08:07

## 2021-01-01 RX ADMIN — ACETAMINOPHEN AND CODEINE PHOSPHATE 1 TABLET: 300; 30 TABLET ORAL at 08:05

## 2021-01-01 RX ADMIN — DICLOFENAC SODIUM 2 G: 10 GEL TOPICAL at 01:06

## 2021-01-01 RX ADMIN — ATORVASTATIN CALCIUM 40 MG: 20 TABLET, FILM COATED ORAL at 08:07

## 2021-01-01 RX ADMIN — LETROZOLE 2.5 MG: 2.5 TABLET ORAL at 09:08

## 2021-01-01 RX ADMIN — HEPARIN SODIUM 5000 UNITS: 5000 INJECTION INTRAVENOUS; SUBCUTANEOUS at 09:07

## 2021-01-01 RX ADMIN — MUPIROCIN: 20 OINTMENT TOPICAL at 09:07

## 2021-01-01 RX ADMIN — AMLODIPINE BESYLATE 10 MG: 10 TABLET ORAL at 06:05

## 2021-01-01 RX ADMIN — CLOPIDOGREL 75 MG: 75 TABLET, FILM COATED ORAL at 12:06

## 2021-01-01 RX ADMIN — ASPIRIN 81 MG: 81 TABLET, COATED ORAL at 11:07

## 2021-01-01 RX ADMIN — LETROZOLE 2.5 MG: 2.5 TABLET ORAL at 10:06

## 2021-01-01 RX ADMIN — CEFEPIME 1 G: 1 INJECTION, POWDER, FOR SOLUTION INTRAMUSCULAR; INTRAVENOUS at 02:07

## 2021-01-01 RX ADMIN — AMLODIPINE BESYLATE 5 MG: 5 TABLET ORAL at 10:07

## 2021-01-01 RX ADMIN — HEPARIN SODIUM 7500 UNITS: 5000 INJECTION INTRAVENOUS; SUBCUTANEOUS at 06:06

## 2021-01-01 RX ADMIN — ASPIRIN 81 MG: 81 TABLET, COATED ORAL at 10:06

## 2021-01-01 RX ADMIN — INSULIN ASPART 1 UNITS: 100 INJECTION, SOLUTION INTRAVENOUS; SUBCUTANEOUS at 09:05

## 2021-01-01 RX ADMIN — IPRATROPIUM BROMIDE AND ALBUTEROL SULFATE 3 ML: .5; 2.5 SOLUTION RESPIRATORY (INHALATION) at 03:07

## 2021-01-01 RX ADMIN — HEPARIN SODIUM 7500 UNITS: 5000 INJECTION INTRAVENOUS; SUBCUTANEOUS at 07:06

## 2021-01-01 RX ADMIN — INSULIN DETEMIR 4 UNITS: 100 INJECTION, SOLUTION SUBCUTANEOUS at 03:07

## 2021-01-01 RX ADMIN — FENTANYL CITRATE 50 MCG: 50 INJECTION, SOLUTION INTRAMUSCULAR; INTRAVENOUS at 01:05

## 2021-01-01 RX ADMIN — ACETAMINOPHEN 1000 MG: 500 TABLET ORAL at 02:07

## 2021-01-01 RX ADMIN — FAMOTIDINE 20 MG: 20 TABLET ORAL at 01:08

## 2021-01-01 RX ADMIN — LETROZOLE 2.5 MG: 2.5 TABLET ORAL at 08:05

## 2021-01-01 RX ADMIN — DICLOFENAC SODIUM 2 G: 10 GEL TOPICAL at 10:06

## 2021-01-01 RX ADMIN — ACETAMINOPHEN AND CODEINE PHOSPHATE 2 TABLET: 300; 30 TABLET ORAL at 12:05

## 2021-01-01 RX ADMIN — SEVELAMER CARBONATE 1600 MG: 800 TABLET, FILM COATED ORAL at 12:07

## 2021-01-01 RX ADMIN — ACETAMINOPHEN AND CODEINE PHOSPHATE 1 TABLET: 300; 30 TABLET ORAL at 11:05

## 2021-01-01 RX ADMIN — MIDAZOLAM 1 MG: 1 INJECTION INTRAMUSCULAR; INTRAVENOUS at 12:06

## 2021-01-01 RX ADMIN — HEPARIN SODIUM 7500 UNITS: 5000 INJECTION INTRAVENOUS; SUBCUTANEOUS at 11:06

## 2021-01-01 RX ADMIN — INSULIN ASPART 2 UNITS: 100 INJECTION, SOLUTION INTRAVENOUS; SUBCUTANEOUS at 07:05

## 2021-01-01 RX ADMIN — HYDRALAZINE HYDROCHLORIDE 25 MG: 25 TABLET ORAL at 12:06

## 2021-01-01 RX ADMIN — OXYCODONE HYDROCHLORIDE AND ACETAMINOPHEN 1 TABLET: 5; 325 TABLET ORAL at 04:07

## 2021-01-01 RX ADMIN — INSULIN ASPART 3 UNITS: 100 INJECTION, SOLUTION INTRAVENOUS; SUBCUTANEOUS at 05:07

## 2021-01-01 RX ADMIN — CARVEDILOL 25 MG: 25 TABLET, FILM COATED ORAL at 08:06

## 2021-01-01 RX ADMIN — DOXYCYCLINE HYCLATE 100 MG: 100 TABLET, COATED ORAL at 09:05

## 2021-01-01 RX ADMIN — SODIUM CHLORIDE: 0.9 INJECTION, SOLUTION INTRAVENOUS at 07:07

## 2021-01-01 RX ADMIN — HEPARIN SODIUM 7500 UNITS: 5000 INJECTION INTRAVENOUS; SUBCUTANEOUS at 01:06

## 2021-01-01 RX ADMIN — CITALOPRAM HYDROBROMIDE 20 MG: 20 TABLET ORAL at 08:07

## 2021-01-01 RX ADMIN — CARVEDILOL 25 MG: 25 TABLET, FILM COATED ORAL at 01:07

## 2021-01-01 RX ADMIN — ACETAMINOPHEN AND CODEINE PHOSPHATE 2 TABLET: 300; 30 TABLET ORAL at 01:06

## 2021-01-01 RX ADMIN — EPOETIN ALFA-EPBX 6040 UNITS: 4000 INJECTION, SOLUTION INTRAVENOUS; SUBCUTANEOUS at 09:06

## 2021-01-01 RX ADMIN — ASPIRIN 81 MG: 81 TABLET, COATED ORAL at 08:08

## 2021-01-01 RX ADMIN — HYDRALAZINE HYDROCHLORIDE 25 MG: 25 TABLET, FILM COATED ORAL at 09:05

## 2021-01-01 RX ADMIN — LETROZOLE 2.5 MG: 2.5 TABLET ORAL at 01:06

## 2021-01-01 RX ADMIN — CLOPIDOGREL 75 MG: 75 TABLET, FILM COATED ORAL at 05:05

## 2021-01-01 RX ADMIN — MELATONIN TAB 3 MG 6 MG: 3 TAB at 09:05

## 2021-01-01 RX ADMIN — CLOPIDOGREL 75 MG: 75 TABLET, FILM COATED ORAL at 10:06

## 2021-01-01 RX ADMIN — FAMOTIDINE 20 MG: 20 TABLET ORAL at 08:08

## 2021-01-01 RX ADMIN — HYDRALAZINE HYDROCHLORIDE 25 MG: 25 TABLET ORAL at 08:05

## 2021-01-01 RX ADMIN — DOCUSATE SODIUM AND SENNOSIDES 1 TABLET: 8.6; 5 TABLET, FILM COATED ORAL at 09:06

## 2021-01-01 RX ADMIN — VANCOMYCIN HYDROCHLORIDE 750 MG: 750 INJECTION, POWDER, LYOPHILIZED, FOR SOLUTION INTRAVENOUS at 12:07

## 2021-01-01 RX ADMIN — HEPARIN SODIUM 5000 UNITS: 5000 INJECTION INTRAVENOUS; SUBCUTANEOUS at 01:07

## 2021-01-01 RX ADMIN — VANCOMYCIN HYDROCHLORIDE 2000 MG: 10 INJECTION, POWDER, LYOPHILIZED, FOR SOLUTION INTRAVENOUS at 03:07

## 2021-01-01 RX ADMIN — HEPARIN SODIUM 7500 UNITS: 5000 INJECTION INTRAVENOUS; SUBCUTANEOUS at 10:08

## 2021-01-01 RX ADMIN — SODIUM CHLORIDE: 9 INJECTION, SOLUTION INTRAVENOUS at 04:07

## 2021-01-01 RX ADMIN — CEFEPIME 2 G: 2 INJECTION, POWDER, FOR SOLUTION INTRAVENOUS at 03:07

## 2021-01-01 RX ADMIN — CITALOPRAM HYDROBROMIDE 20 MG: 20 TABLET ORAL at 09:08

## 2021-01-01 RX ADMIN — EPOETIN ALFA-EPBX 6040 UNITS: 4000 INJECTION, SOLUTION INTRAVENOUS; SUBCUTANEOUS at 10:06

## 2021-01-01 RX ADMIN — MIDAZOLAM 2 MG: 1 INJECTION INTRAMUSCULAR; INTRAVENOUS at 11:06

## 2021-01-01 RX ADMIN — INSULIN DETEMIR 3 UNITS: 100 INJECTION, SOLUTION SUBCUTANEOUS at 08:11

## 2021-01-01 RX ADMIN — EPOETIN ALFA-EPBX 6040 UNITS: 4000 INJECTION, SOLUTION INTRAVENOUS; SUBCUTANEOUS at 04:06

## 2021-01-01 RX ADMIN — INSULIN ASPART 3 UNITS: 100 INJECTION, SOLUTION INTRAVENOUS; SUBCUTANEOUS at 11:08

## 2021-01-01 RX ADMIN — METOCLOPRAMIDE 5 MG: 5 INJECTION, SOLUTION INTRAMUSCULAR; INTRAVENOUS at 02:06

## 2021-01-01 RX ADMIN — DICLOFENAC SODIUM 2 G: 10 GEL TOPICAL at 09:05

## 2021-01-01 RX ADMIN — MIDAZOLAM 2 MG: 1 INJECTION INTRAMUSCULAR; INTRAVENOUS at 01:07

## 2021-01-01 RX ADMIN — CARVEDILOL 25 MG: 25 TABLET, FILM COATED ORAL at 02:07

## 2021-01-01 RX ADMIN — IPRATROPIUM BROMIDE AND ALBUTEROL SULFATE 3 ML: .5; 2.5 SOLUTION RESPIRATORY (INHALATION) at 07:07

## 2021-01-01 RX ADMIN — AMLODIPINE BESYLATE 5 MG: 5 TABLET ORAL at 07:07

## 2021-01-01 RX ADMIN — HEPARIN SODIUM 5000 UNITS: 5000 INJECTION INTRAVENOUS; SUBCUTANEOUS at 10:07

## 2021-01-01 RX ADMIN — ACETAMINOPHEN AND CODEINE PHOSPHATE 2 TABLET: 300; 30 TABLET ORAL at 04:05

## 2021-01-01 RX ADMIN — ASPIRIN 81 MG: 81 TABLET, COATED ORAL at 01:07

## 2021-01-01 RX ADMIN — HEPARIN SODIUM 7500 UNITS: 5000 INJECTION INTRAVENOUS; SUBCUTANEOUS at 02:05

## 2021-01-01 RX ADMIN — GABAPENTIN 300 MG: 300 CAPSULE ORAL at 09:08

## 2021-01-01 RX ADMIN — CLOPIDOGREL 75 MG: 75 TABLET, FILM COATED ORAL at 01:06

## 2021-01-01 RX ADMIN — CITALOPRAM HYDROBROMIDE 20 MG: 20 TABLET ORAL at 09:11

## 2021-01-01 RX ADMIN — OXYCODONE HYDROCHLORIDE 10 MG: 10 TABLET ORAL at 01:07

## 2021-01-01 RX ADMIN — ASPIRIN 81 MG: 81 TABLET, COATED ORAL at 09:05

## 2021-01-01 RX ADMIN — CEFEPIME 2 G: 2 INJECTION, POWDER, FOR SOLUTION INTRAVENOUS at 05:06

## 2021-01-01 RX ADMIN — DICLOFENAC SODIUM 2 G: 10 GEL TOPICAL at 08:06

## 2021-01-01 RX ADMIN — AMLODIPINE BESYLATE 5 MG: 5 TABLET ORAL at 07:06

## 2021-01-01 RX ADMIN — INSULIN DETEMIR 3 UNITS: 100 INJECTION, SOLUTION SUBCUTANEOUS at 09:11

## 2021-01-01 RX ADMIN — METRONIDAZOLE 500 MG: 500 INJECTION, SOLUTION INTRAVENOUS at 06:05

## 2021-01-01 RX ADMIN — IPRATROPIUM BROMIDE AND ALBUTEROL SULFATE 3 ML: .5; 2.5 SOLUTION RESPIRATORY (INHALATION) at 05:06

## 2021-01-01 RX ADMIN — HEPARIN SODIUM 7500 UNITS: 5000 INJECTION INTRAVENOUS; SUBCUTANEOUS at 08:07

## 2021-01-01 RX ADMIN — ALBUTEROL SULFATE 2 PUFF: 108 INHALANT RESPIRATORY (INHALATION) at 10:06

## 2021-01-01 RX ADMIN — ASPIRIN 81 MG: 81 TABLET, COATED ORAL at 01:06

## 2021-01-01 RX ADMIN — DOCUSATE SODIUM 50 MG AND SENNOSIDES 8.6 MG 1 TABLET: 8.6; 5 TABLET, FILM COATED ORAL at 10:07

## 2021-01-01 RX ADMIN — INSULIN DETEMIR 10 UNITS: 100 INJECTION, SOLUTION SUBCUTANEOUS at 11:06

## 2021-01-01 RX ADMIN — ATORVASTATIN CALCIUM 40 MG: 20 TABLET, FILM COATED ORAL at 09:05

## 2021-01-01 RX ADMIN — LEVALBUTEROL 1.25 MG: 1.25 SOLUTION, CONCENTRATE RESPIRATORY (INHALATION) at 11:07

## 2021-01-01 RX ADMIN — BUPIVACAINE HYDROCHLORIDE AND EPINEPHRINE BITARTRATE 30 ML: 5; .005 INJECTION, SOLUTION EPIDURAL; INTRACAUDAL; PERINEURAL at 12:06

## 2021-01-01 RX ADMIN — LETROZOLE 2.5 MG: 2.5 TABLET ORAL at 08:06

## 2021-01-01 RX ADMIN — CARVEDILOL 25 MG: 25 TABLET, FILM COATED ORAL at 12:06

## 2021-01-01 RX ADMIN — SODIUM CHLORIDE: 0.9 INJECTION, SOLUTION INTRAVENOUS at 01:07

## 2021-01-01 RX ADMIN — FLUTICASONE PROPIONATE 100 MCG: 50 SPRAY, METERED NASAL at 09:06

## 2021-01-01 RX ADMIN — ATORVASTATIN CALCIUM 40 MG: 20 TABLET, FILM COATED ORAL at 10:07

## 2021-01-01 RX ADMIN — INSULIN ASPART 4 UNITS: 100 INJECTION, SOLUTION INTRAVENOUS; SUBCUTANEOUS at 12:05

## 2021-01-01 RX ADMIN — CLOPIDOGREL 75 MG: 75 TABLET, FILM COATED ORAL at 01:05

## 2021-01-01 RX ADMIN — IPRATROPIUM BROMIDE AND ALBUTEROL SULFATE 3 ML: .5; 2.5 SOLUTION RESPIRATORY (INHALATION) at 08:06

## 2021-01-01 RX ADMIN — GABAPENTIN 300 MG: 300 CAPSULE ORAL at 10:08

## 2021-01-01 RX ADMIN — ASPIRIN 81 MG: 81 TABLET, COATED ORAL at 09:07

## 2021-01-01 RX ADMIN — SODIUM CHLORIDE: 0.9 INJECTION, SOLUTION INTRAVENOUS at 10:07

## 2021-01-01 RX ADMIN — HYDRALAZINE HYDROCHLORIDE 100 MG: 25 TABLET, FILM COATED ORAL at 01:11

## 2021-01-01 RX ADMIN — GABAPENTIN 400 MG: 400 CAPSULE ORAL at 10:05

## 2021-01-01 RX ADMIN — AMLODIPINE BESYLATE 5 MG: 5 TABLET ORAL at 09:07

## 2021-01-01 RX ADMIN — INSULIN ASPART 3 UNITS: 100 INJECTION, SOLUTION INTRAVENOUS; SUBCUTANEOUS at 12:08

## 2021-01-01 RX ADMIN — CEFEPIME 1 G: 1 INJECTION, POWDER, FOR SOLUTION INTRAMUSCULAR; INTRAVENOUS at 04:07

## 2021-01-01 RX ADMIN — ALBUTEROL SULFATE 2 PUFF: 108 INHALANT RESPIRATORY (INHALATION) at 10:05

## 2021-01-01 RX ADMIN — ASPIRIN 81 MG: 81 TABLET, COATED ORAL at 04:05

## 2021-01-01 RX ADMIN — VANCOMYCIN HYDROCHLORIDE 500 MG: 500 INJECTION, POWDER, LYOPHILIZED, FOR SOLUTION INTRAVENOUS at 12:08

## 2021-01-01 RX ADMIN — CLOPIDOGREL BISULFATE 75 MG: 75 TABLET, FILM COATED ORAL at 01:07

## 2021-01-01 RX ADMIN — DILTIAZEM HYDROCHLORIDE 240 MG: 120 CAPSULE, COATED, EXTENDED RELEASE ORAL at 08:11

## 2021-01-01 RX ADMIN — METOROPROLOL TARTRATE 5 MG: 5 INJECTION, SOLUTION INTRAVENOUS at 06:05

## 2021-01-01 RX ADMIN — ASPIRIN 81 MG: 81 TABLET, COATED ORAL at 10:07

## 2021-01-01 RX ADMIN — HYDRALAZINE HYDROCHLORIDE 25 MG: 25 TABLET, FILM COATED ORAL at 10:05

## 2021-01-01 RX ADMIN — ACETAMINOPHEN AND CODEINE PHOSPHATE 1 TABLET: 300; 30 TABLET ORAL at 12:05

## 2021-01-01 RX ADMIN — CARVEDILOL 6.25 MG: 6.25 TABLET, FILM COATED ORAL at 08:11

## 2021-01-01 RX ADMIN — INSULIN ASPART 2 UNITS: 100 INJECTION, SOLUTION INTRAVENOUS; SUBCUTANEOUS at 11:06

## 2021-01-01 RX ADMIN — AMLODIPINE BESYLATE 5 MG: 5 TABLET ORAL at 08:11

## 2021-01-01 RX ADMIN — IPRATROPIUM BROMIDE AND ALBUTEROL SULFATE 3 ML: .5; 2.5 SOLUTION RESPIRATORY (INHALATION) at 08:07

## 2021-01-01 RX ADMIN — HEPARIN SODIUM 5000 UNITS: 5000 INJECTION INTRAVENOUS; SUBCUTANEOUS at 04:07

## 2021-01-01 RX ADMIN — ACETAMINOPHEN 650 MG: 325 TABLET ORAL at 10:07

## 2021-01-01 RX ADMIN — VANCOMYCIN HYDROCHLORIDE 500 MG: 500 INJECTION, POWDER, LYOPHILIZED, FOR SOLUTION INTRAVENOUS at 03:07

## 2021-01-01 RX ADMIN — HEPARIN SODIUM 7500 UNITS: 5000 INJECTION INTRAVENOUS; SUBCUTANEOUS at 02:06

## 2021-01-01 RX ADMIN — ACETAMINOPHEN AND CODEINE PHOSPHATE 2 TABLET: 300; 30 TABLET ORAL at 11:06

## 2021-01-01 RX ADMIN — INSULIN DETEMIR 10 UNITS: 100 INJECTION, SOLUTION SUBCUTANEOUS at 04:05

## 2021-01-01 RX ADMIN — ALBUTEROL SULFATE 2 PUFF: 108 INHALANT RESPIRATORY (INHALATION) at 12:06

## 2021-01-01 RX ADMIN — EPOETIN ALFA-EPBX 6000 UNITS: 10000 INJECTION, SOLUTION INTRAVENOUS; SUBCUTANEOUS at 05:07

## 2021-01-01 RX ADMIN — SEVELAMER CARBONATE 1600 MG: 800 TABLET, FILM COATED ORAL at 09:07

## 2021-01-01 RX ADMIN — INSULIN ASPART 2 UNITS: 100 INJECTION, SOLUTION INTRAVENOUS; SUBCUTANEOUS at 05:07

## 2021-01-01 RX ADMIN — CEFEPIME 1 G: 1 INJECTION, POWDER, FOR SOLUTION INTRAMUSCULAR; INTRAVENOUS at 01:05

## 2021-01-01 RX ADMIN — MUPIROCIN: 20 OINTMENT TOPICAL at 09:11

## 2021-01-01 RX ADMIN — IPRATROPIUM BROMIDE AND ALBUTEROL SULFATE 3 ML: .5; 2.5 SOLUTION RESPIRATORY (INHALATION) at 04:07

## 2021-01-01 RX ADMIN — CARVEDILOL 25 MG: 25 TABLET, FILM COATED ORAL at 08:07

## 2021-01-01 RX ADMIN — OXYCODONE HYDROCHLORIDE 10 MG: 10 TABLET ORAL at 11:07

## 2021-01-01 RX ADMIN — PIPERACILLIN SODIUM AND TAZOBACTAM SODIUM 4.5 G: 4; .5 INJECTION, POWDER, FOR SOLUTION INTRAVENOUS at 01:07

## 2021-01-01 RX ADMIN — ATORVASTATIN CALCIUM 40 MG: 20 TABLET, FILM COATED ORAL at 01:06

## 2021-01-01 RX ADMIN — Medication 10 ML: at 09:11

## 2021-01-01 RX ADMIN — ACETAMINOPHEN AND CODEINE PHOSPHATE 2 TABLET: 300; 30 TABLET ORAL at 04:06

## 2021-01-01 RX ADMIN — AMOXICILLIN AND CLAVULANATE POTASSIUM 500 MG: 500; 125 TABLET, FILM COATED ORAL at 08:05

## 2021-01-01 RX ADMIN — CLOPIDOGREL 75 MG: 75 TABLET, FILM COATED ORAL at 08:11

## 2021-01-01 RX ADMIN — CARVEDILOL 12.5 MG: 12.5 TABLET, FILM COATED ORAL at 08:11

## 2021-01-01 RX ADMIN — INSULIN ASPART 2 UNITS: 100 INJECTION, SOLUTION INTRAVENOUS; SUBCUTANEOUS at 06:05

## 2021-01-01 RX ADMIN — LEVALBUTEROL 1.25 MG: 1.25 SOLUTION, CONCENTRATE RESPIRATORY (INHALATION) at 07:07

## 2021-01-01 RX ADMIN — ACETAMINOPHEN 650 MG: 325 TABLET ORAL at 12:05

## 2021-01-01 RX ADMIN — ACETAMINOPHEN AND CODEINE PHOSPHATE 2 TABLET: 300; 30 TABLET ORAL at 03:06

## 2021-01-01 RX ADMIN — IPRATROPIUM BROMIDE AND ALBUTEROL SULFATE 3 ML: .5; 2.5 SOLUTION RESPIRATORY (INHALATION) at 09:07

## 2021-01-01 RX ADMIN — POLYETHYLENE GLYCOL 3350 17 G: 17 POWDER, FOR SOLUTION ORAL at 08:05

## 2021-01-01 RX ADMIN — FENTANYL CITRATE 25 MCG: 50 INJECTION, SOLUTION INTRAMUSCULAR; INTRAVENOUS at 12:05

## 2021-01-01 RX ADMIN — LABETALOL HYDROCHLORIDE 10 MG: 5 INJECTION INTRAVENOUS at 04:11

## 2021-01-01 RX ADMIN — ATORVASTATIN CALCIUM 40 MG: 20 TABLET, FILM COATED ORAL at 03:05

## 2021-01-01 RX ADMIN — LEVALBUTEROL 1.25 MG: 1.25 SOLUTION, CONCENTRATE RESPIRATORY (INHALATION) at 12:07

## 2021-01-01 RX ADMIN — CARVEDILOL 12.5 MG: 12.5 TABLET, FILM COATED ORAL at 10:11

## 2021-01-01 RX ADMIN — LETROZOLE 2.5 MG: 2.5 TABLET ORAL at 10:08

## 2021-01-01 RX ADMIN — CLOPIDOGREL 75 MG: 75 TABLET, FILM COATED ORAL at 09:05

## 2021-01-01 RX ADMIN — EPOETIN ALFA-EPBX 6000 UNITS: 10000 INJECTION, SOLUTION INTRAVENOUS; SUBCUTANEOUS at 12:08

## 2021-01-01 RX ADMIN — CARVEDILOL 6.25 MG: 6.25 TABLET, FILM COATED ORAL at 08:08

## 2021-01-01 RX ADMIN — INSULIN DETEMIR 4 UNITS: 100 INJECTION, SOLUTION SUBCUTANEOUS at 10:08

## 2021-01-01 RX ADMIN — IPRATROPIUM BROMIDE AND ALBUTEROL SULFATE 3 ML: .5; 2.5 SOLUTION RESPIRATORY (INHALATION) at 10:08

## 2021-01-01 RX ADMIN — INSULIN ASPART 1 UNITS: 100 INJECTION, SOLUTION INTRAVENOUS; SUBCUTANEOUS at 12:07

## 2021-01-01 RX ADMIN — PROPOFOL 40 MG: 10 INJECTION, EMULSION INTRAVENOUS at 12:06

## 2021-01-01 RX ADMIN — INSULIN ASPART 2 UNITS: 100 INJECTION, SOLUTION INTRAVENOUS; SUBCUTANEOUS at 05:05

## 2021-01-01 RX ADMIN — Medication 10 ML: at 02:11

## 2021-01-01 RX ADMIN — INSULIN ASPART 2 UNITS: 100 INJECTION, SOLUTION INTRAVENOUS; SUBCUTANEOUS at 10:05

## 2021-01-01 RX ADMIN — GUAIFENESIN 600 MG: 600 TABLET, EXTENDED RELEASE ORAL at 01:06

## 2021-01-01 RX ADMIN — OXYCODONE HYDROCHLORIDE 10 MG: 10 TABLET ORAL at 09:07

## 2021-01-01 RX ADMIN — HEPARIN SODIUM 7500 UNITS: 5000 INJECTION INTRAVENOUS; SUBCUTANEOUS at 10:05

## 2021-01-01 RX ADMIN — CITALOPRAM HYDROBROMIDE 20 MG: 20 TABLET ORAL at 08:11

## 2021-01-01 RX ADMIN — LETROZOLE 2.5 MG: 2.5 TABLET ORAL at 11:05

## 2021-01-01 RX ADMIN — CARVEDILOL 25 MG: 25 TABLET, FILM COATED ORAL at 11:06

## 2021-01-01 RX ADMIN — CLOPIDOGREL 75 MG: 75 TABLET, FILM COATED ORAL at 10:05

## 2021-01-01 RX ADMIN — INSULIN ASPART 2 UNITS: 100 INJECTION, SOLUTION INTRAVENOUS; SUBCUTANEOUS at 04:05

## 2021-01-01 RX ADMIN — HEPARIN SODIUM 7500 UNITS: 5000 INJECTION INTRAVENOUS; SUBCUTANEOUS at 07:07

## 2021-01-01 RX ADMIN — ATORVASTATIN CALCIUM 40 MG: 20 TABLET, FILM COATED ORAL at 08:11

## 2021-01-01 RX ADMIN — HEPARIN SODIUM 7500 UNITS: 5000 INJECTION INTRAVENOUS; SUBCUTANEOUS at 08:08

## 2021-01-01 RX ADMIN — AMLODIPINE BESYLATE 5 MG: 5 TABLET ORAL at 08:07

## 2021-01-01 RX ADMIN — Medication 10 ML: at 01:11

## 2021-01-01 RX ADMIN — INSULIN ASPART 3 UNITS: 100 INJECTION, SOLUTION INTRAVENOUS; SUBCUTANEOUS at 08:08

## 2021-01-01 RX ADMIN — DOCUSATE SODIUM 50 MG AND SENNOSIDES 8.6 MG 1 TABLET: 8.6; 5 TABLET, FILM COATED ORAL at 08:08

## 2021-01-01 RX ADMIN — ATORVASTATIN CALCIUM 40 MG: 20 TABLET, FILM COATED ORAL at 09:06

## 2021-01-01 RX ADMIN — HYDRALAZINE HYDROCHLORIDE 25 MG: 25 TABLET, FILM COATED ORAL at 08:07

## 2021-01-01 RX ADMIN — ACETAMINOPHEN AND CODEINE PHOSPHATE 2 TABLET: 300; 30 TABLET ORAL at 10:06

## 2021-01-01 RX ADMIN — INSULIN ASPART 2 UNITS: 100 INJECTION, SOLUTION INTRAVENOUS; SUBCUTANEOUS at 01:07

## 2021-01-01 RX ADMIN — SEVELAMER CARBONATE 1600 MG: 800 TABLET, FILM COATED ORAL at 03:07

## 2021-01-01 RX ADMIN — HYDRALAZINE HYDROCHLORIDE 25 MG: 25 TABLET ORAL at 11:06

## 2021-01-01 RX ADMIN — INSULIN DETEMIR 10 UNITS: 100 INJECTION, SOLUTION SUBCUTANEOUS at 09:05

## 2021-01-01 RX ADMIN — FENTANYL CITRATE 25 MCG: 50 INJECTION, SOLUTION INTRAMUSCULAR; INTRAVENOUS at 11:05

## 2021-01-01 RX ADMIN — MIDAZOLAM HYDROCHLORIDE 2 MG: 1 INJECTION, SOLUTION INTRAMUSCULAR; INTRAVENOUS at 04:07

## 2021-01-01 RX ADMIN — GUAIFENESIN 600 MG: 600 TABLET, EXTENDED RELEASE ORAL at 12:06

## 2021-01-01 RX ADMIN — HEPARIN SODIUM 7500 UNITS: 5000 INJECTION INTRAVENOUS; SUBCUTANEOUS at 03:06

## 2021-01-01 RX ADMIN — AMLODIPINE BESYLATE 5 MG: 5 TABLET ORAL at 03:07

## 2021-01-01 RX ADMIN — BENZONATATE 100 MG: 100 CAPSULE ORAL at 02:06

## 2021-01-01 RX ADMIN — INSULIN ASPART 2 UNITS: 100 INJECTION, SOLUTION INTRAVENOUS; SUBCUTANEOUS at 12:06

## 2021-01-01 RX ADMIN — EPOETIN ALFA-EPBX 6000 UNITS: 10000 INJECTION, SOLUTION INTRAVENOUS; SUBCUTANEOUS at 01:07

## 2021-01-01 RX ADMIN — CLOPIDOGREL BISULFATE 75 MG: 75 TABLET, FILM COATED ORAL at 07:07

## 2021-01-01 RX ADMIN — MUPIROCIN: 20 OINTMENT TOPICAL at 03:08

## 2021-01-01 RX ADMIN — ACETAMINOPHEN 650 MG: 325 TABLET ORAL at 03:11

## 2021-01-01 RX ADMIN — ATORVASTATIN CALCIUM 40 MG: 20 TABLET, FILM COATED ORAL at 10:05

## 2021-01-01 RX ADMIN — CARVEDILOL 3.12 MG: 3.12 TABLET, FILM COATED ORAL at 08:07

## 2021-01-01 RX ADMIN — IPRATROPIUM BROMIDE AND ALBUTEROL SULFATE 3 ML: .5; 2.5 SOLUTION RESPIRATORY (INHALATION) at 11:06

## 2021-01-01 RX ADMIN — IPRATROPIUM BROMIDE AND ALBUTEROL SULFATE 3 ML: .5; 2.5 SOLUTION RESPIRATORY (INHALATION) at 10:06

## 2021-01-01 RX ADMIN — ACETAMINOPHEN 650 MG: 325 TABLET ORAL at 12:07

## 2021-01-01 RX ADMIN — EPOETIN ALFA-EPBX 6000 UNITS: 10000 INJECTION, SOLUTION INTRAVENOUS; SUBCUTANEOUS at 11:07

## 2021-01-01 RX ADMIN — CARVEDILOL 6.25 MG: 3.12 TABLET, FILM COATED ORAL at 04:08

## 2021-01-01 RX ADMIN — CARVEDILOL 6.25 MG: 6.25 TABLET, FILM COATED ORAL at 09:07

## 2021-01-01 RX ADMIN — INSULIN DETEMIR 4 UNITS: 100 INJECTION, SOLUTION SUBCUTANEOUS at 08:08

## 2021-01-01 RX ADMIN — SODIUM CHLORIDE 100 ML/HR: 0.9 INJECTION, SOLUTION INTRAVENOUS at 08:06

## 2021-01-01 RX ADMIN — PROPOFOL 30 MG: 10 INJECTION, EMULSION INTRAVENOUS at 02:07

## 2021-01-01 RX ADMIN — SODIUM CHLORIDE 100 ML/HR: 0.9 INJECTION, SOLUTION INTRAVENOUS at 08:05

## 2021-01-01 RX ADMIN — HYDRALAZINE HYDROCHLORIDE 25 MG: 25 TABLET ORAL at 05:05

## 2021-01-01 RX ADMIN — MIDAZOLAM HYDROCHLORIDE 2 MG: 1 INJECTION, SOLUTION INTRAMUSCULAR; INTRAVENOUS at 11:05

## 2021-01-01 RX ADMIN — AMLODIPINE BESYLATE 10 MG: 10 TABLET ORAL at 08:06

## 2021-01-01 RX ADMIN — METRONIDAZOLE 500 MG: 500 INJECTION, SOLUTION INTRAVENOUS at 09:05

## 2021-01-01 RX ADMIN — ACETAMINOPHEN AND CODEINE PHOSPHATE 1 TABLET: 300; 30 TABLET ORAL at 04:05

## 2021-01-01 RX ADMIN — SODIUM CHLORIDE: 0.9 INJECTION, SOLUTION INTRAVENOUS at 03:05

## 2021-01-01 RX ADMIN — HEPARIN SODIUM 7500 UNITS: 5000 INJECTION INTRAVENOUS; SUBCUTANEOUS at 04:05

## 2021-01-01 RX ADMIN — ATORVASTATIN CALCIUM 40 MG: 20 TABLET, FILM COATED ORAL at 01:08

## 2021-01-01 RX ADMIN — AMOXICILLIN AND CLAVULANATE POTASSIUM 500 MG: 500; 125 TABLET, FILM COATED ORAL at 09:05

## 2021-01-01 RX ADMIN — PIPERACILLIN AND TAZOBACTAM 4.5 G: 4; .5 INJECTION, POWDER, LYOPHILIZED, FOR SOLUTION INTRAVENOUS; PARENTERAL at 02:05

## 2021-01-01 RX ADMIN — ACETAMINOPHEN AND CODEINE PHOSPHATE 1 TABLET: 300; 30 TABLET ORAL at 09:05

## 2021-01-01 RX ADMIN — SODIUM CHLORIDE: 0.9 INJECTION, SOLUTION INTRAVENOUS at 07:06

## 2021-01-01 RX ADMIN — AMLODIPINE BESYLATE 5 MG: 5 TABLET ORAL at 02:07

## 2021-01-01 RX ADMIN — INSULIN ASPART 3 UNITS: 100 INJECTION, SOLUTION INTRAVENOUS; SUBCUTANEOUS at 05:08

## 2021-01-01 RX ADMIN — INSULIN ASPART 2 UNITS: 100 INJECTION, SOLUTION INTRAVENOUS; SUBCUTANEOUS at 08:05

## 2021-01-01 RX ADMIN — HEPARIN SODIUM 2000 UNITS: 1000 INJECTION, SOLUTION INTRAVENOUS; SUBCUTANEOUS at 12:05

## 2021-01-01 RX ADMIN — CEFEPIME 2 G: 2 INJECTION, POWDER, FOR SOLUTION INTRAVENOUS at 07:08

## 2021-01-01 RX ADMIN — INSULIN ASPART 3 UNITS: 100 INJECTION, SOLUTION INTRAVENOUS; SUBCUTANEOUS at 04:07

## 2021-01-01 RX ADMIN — Medication 10 ML: at 10:11

## 2021-01-01 RX ADMIN — ATORVASTATIN CALCIUM 40 MG: 20 TABLET, FILM COATED ORAL at 05:05

## 2021-01-01 RX ADMIN — INSULIN ASPART 2 UNITS: 100 INJECTION, SOLUTION INTRAVENOUS; SUBCUTANEOUS at 09:05

## 2021-01-01 RX ADMIN — INSULIN ASPART 3 UNITS: 100 INJECTION, SOLUTION INTRAVENOUS; SUBCUTANEOUS at 11:07

## 2021-01-01 RX ADMIN — PROPOFOL 20 MG: 10 INJECTION, EMULSION INTRAVENOUS at 01:06

## 2021-01-01 RX ADMIN — ACETAMINOPHEN AND CODEINE PHOSPHATE 2 TABLET: 300; 30 TABLET ORAL at 07:05

## 2021-01-01 RX ADMIN — FENTANYL CITRATE 25 MCG: 50 INJECTION INTRAMUSCULAR; INTRAVENOUS at 02:07

## 2021-01-01 RX ADMIN — IRON SUCROSE 100 MG: 20 INJECTION, SOLUTION INTRAVENOUS at 09:06

## 2021-01-01 RX ADMIN — ONDANSETRON 4 MG: 2 INJECTION INTRAMUSCULAR; INTRAVENOUS at 04:07

## 2021-01-01 RX ADMIN — CEFEPIME HYDROCHLORIDE 1 G: 1 INJECTION, SOLUTION INTRAVENOUS at 08:11

## 2021-01-01 RX ADMIN — FAMOTIDINE 20 MG: 20 TABLET, FILM COATED ORAL at 08:08

## 2021-01-01 RX ADMIN — ASPIRIN 81 MG: 81 TABLET, COATED ORAL at 09:06

## 2021-01-01 RX ADMIN — ATORVASTATIN CALCIUM 40 MG: 20 TABLET, FILM COATED ORAL at 01:07

## 2021-01-01 RX ADMIN — SEVELAMER CARBONATE 1600 MG: 800 TABLET, FILM COATED ORAL at 01:06

## 2021-01-01 RX ADMIN — CEFTRIAXONE 2 G: 2 INJECTION, SOLUTION INTRAVENOUS at 04:07

## 2021-01-01 RX ADMIN — OXYCODONE 5 MG: 5 TABLET ORAL at 01:07

## 2021-01-01 RX ADMIN — CITALOPRAM HYDROBROMIDE 20 MG: 10 TABLET ORAL at 11:06

## 2021-01-01 RX ADMIN — CARVEDILOL 25 MG: 25 TABLET, FILM COATED ORAL at 03:05

## 2021-01-01 RX ADMIN — ACETAMINOPHEN 650 MG: 325 TABLET ORAL at 05:07

## 2021-01-01 RX ADMIN — BENZONATATE 100 MG: 100 CAPSULE ORAL at 01:06

## 2021-01-01 RX ADMIN — AMLODIPINE BESYLATE 10 MG: 10 TABLET ORAL at 09:05

## 2021-01-01 RX ADMIN — INSULIN DETEMIR 7 UNITS: 100 INJECTION, SOLUTION SUBCUTANEOUS at 05:05

## 2021-01-01 RX ADMIN — GUAIFENESIN 600 MG: 600 TABLET, EXTENDED RELEASE ORAL at 05:06

## 2021-01-01 RX ADMIN — CARVEDILOL 25 MG: 25 TABLET, FILM COATED ORAL at 10:07

## 2021-01-01 RX ADMIN — CLOPIDOGREL BISULFATE 75 MG: 75 TABLET, FILM COATED ORAL at 09:07

## 2021-01-01 RX ADMIN — FLUTICASONE PROPIONATE 100 MCG: 50 SPRAY, METERED NASAL at 08:06

## 2021-01-01 RX ADMIN — IPRATROPIUM BROMIDE AND ALBUTEROL SULFATE 3 ML: .5; 2.5 SOLUTION RESPIRATORY (INHALATION) at 07:06

## 2021-01-01 RX ADMIN — DEXMEDETOMIDINE HYDROCHLORIDE 4 MCG: 100 INJECTION, SOLUTION INTRAVENOUS at 12:05

## 2021-01-01 RX ADMIN — IPRATROPIUM BROMIDE AND ALBUTEROL SULFATE 3 ML: .5; 2.5 SOLUTION RESPIRATORY (INHALATION) at 06:06

## 2021-01-01 RX ADMIN — INSULIN ASPART 3 UNITS: 100 INJECTION, SOLUTION INTRAVENOUS; SUBCUTANEOUS at 07:07

## 2021-01-01 RX ADMIN — CARVEDILOL 6.25 MG: 6.25 TABLET, FILM COATED ORAL at 08:07

## 2021-01-01 RX ADMIN — ACETAMINOPHEN AND CODEINE PHOSPHATE 1 TABLET: 300; 30 TABLET ORAL at 12:06

## 2021-01-01 RX ADMIN — AMLODIPINE BESYLATE 5 MG: 5 TABLET ORAL at 01:08

## 2021-01-01 RX ADMIN — ENOXAPARIN SODIUM 40 MG: 40 INJECTION SUBCUTANEOUS at 07:05

## 2021-01-01 RX ADMIN — IPRATROPIUM BROMIDE AND ALBUTEROL SULFATE 3 ML: .5; 2.5 SOLUTION RESPIRATORY (INHALATION) at 01:08

## 2021-01-01 RX ADMIN — DOCUSATE SODIUM 50 MG AND SENNOSIDES 8.6 MG 1 TABLET: 8.6; 5 TABLET, FILM COATED ORAL at 09:11

## 2021-01-01 RX ADMIN — ACETAMINOPHEN 650 MG: 325 TABLET ORAL at 09:05

## 2021-01-01 RX ADMIN — DILTIAZEM HYDROCHLORIDE 180 MG: 180 CAPSULE, COATED, EXTENDED RELEASE ORAL at 03:11

## 2021-01-01 RX ADMIN — INSULIN ASPART 1 UNITS: 100 INJECTION, SOLUTION INTRAVENOUS; SUBCUTANEOUS at 08:05

## 2021-01-01 RX ADMIN — AMLODIPINE BESYLATE 5 MG: 5 TABLET ORAL at 05:06

## 2021-01-01 RX ADMIN — EPOETIN ALFA-EPBX 6040 UNITS: 4000 INJECTION, SOLUTION INTRAVENOUS; SUBCUTANEOUS at 10:05

## 2021-01-01 RX ADMIN — ACETAMINOPHEN AND CODEINE PHOSPHATE 1 TABLET: 300; 30 TABLET ORAL at 05:05

## 2021-01-01 RX ADMIN — DOCUSATE SODIUM 50 MG AND SENNOSIDES 8.6 MG 1 TABLET: 8.6; 5 TABLET, FILM COATED ORAL at 01:08

## 2021-01-01 RX ADMIN — HYDRALAZINE HYDROCHLORIDE 25 MG: 25 TABLET ORAL at 01:05

## 2021-01-01 RX ADMIN — BENZONATATE 100 MG: 100 CAPSULE ORAL at 06:06

## 2021-01-01 RX ADMIN — DICLOFENAC SODIUM 2 G: 10 GEL TOPICAL at 11:06

## 2021-01-01 RX ADMIN — CLOPIDOGREL 75 MG: 75 TABLET, FILM COATED ORAL at 04:05

## 2021-01-01 RX ADMIN — CARVEDILOL 6.25 MG: 6.25 TABLET, FILM COATED ORAL at 01:08

## 2021-01-01 RX ADMIN — DOCUSATE SODIUM AND SENNOSIDES 1 TABLET: 8.6; 5 TABLET, FILM COATED ORAL at 10:06

## 2021-01-01 RX ADMIN — EPOETIN ALFA-EPBX 6000 UNITS: 10000 INJECTION, SOLUTION INTRAVENOUS; SUBCUTANEOUS at 03:07

## 2021-01-01 RX ADMIN — ASPIRIN 81 MG: 81 TABLET, COATED ORAL at 03:07

## 2021-01-01 RX ADMIN — ATORVASTATIN CALCIUM 40 MG: 20 TABLET, FILM COATED ORAL at 10:06

## 2021-01-01 RX ADMIN — AMLODIPINE BESYLATE 5 MG: 5 TABLET ORAL at 10:06

## 2021-01-01 RX ADMIN — SEVELAMER CARBONATE 1600 MG: 800 TABLET, FILM COATED ORAL at 05:07

## 2021-01-01 RX ADMIN — CLOPIDOGREL BISULFATE 75 MG: 75 TABLET, FILM COATED ORAL at 08:08

## 2021-01-01 RX ADMIN — ACETAMINOPHEN AND CODEINE PHOSPHATE 2 TABLET: 300; 30 TABLET ORAL at 09:06

## 2021-01-01 RX ADMIN — INSULIN DETEMIR 4 UNITS: 100 INJECTION, SOLUTION SUBCUTANEOUS at 01:07

## 2021-01-01 RX ADMIN — ATORVASTATIN CALCIUM 40 MG: 20 TABLET, FILM COATED ORAL at 12:06

## 2021-01-01 RX ADMIN — EPOETIN ALFA-EPBX 6040 UNITS: 4000 INJECTION, SOLUTION INTRAVENOUS; SUBCUTANEOUS at 09:05

## 2021-01-01 RX ADMIN — ACETAMINOPHEN 650 MG: 325 TABLET ORAL at 07:07

## 2021-01-01 RX ADMIN — CEFEPIME 1 G: 1 INJECTION, POWDER, FOR SOLUTION INTRAMUSCULAR; INTRAVENOUS at 12:05

## 2021-01-01 RX ADMIN — INSULIN ASPART 2 UNITS: 100 INJECTION, SOLUTION INTRAVENOUS; SUBCUTANEOUS at 11:05

## 2021-01-01 RX ADMIN — DOCUSATE SODIUM 50 MG: 50 CAPSULE, LIQUID FILLED ORAL at 08:05

## 2021-01-01 RX ADMIN — INSULIN ASPART 3 UNITS: 100 INJECTION, SOLUTION INTRAVENOUS; SUBCUTANEOUS at 09:06

## 2021-01-01 RX ADMIN — ONDANSETRON 4 MG: 2 INJECTION, SOLUTION INTRAMUSCULAR; INTRAVENOUS at 12:05

## 2021-01-01 RX ADMIN — CARVEDILOL 3.12 MG: 3.12 TABLET, FILM COATED ORAL at 03:07

## 2021-01-01 RX ADMIN — SEVELAMER CARBONATE 1600 MG: 800 TABLET, FILM COATED ORAL at 06:06

## 2021-01-01 RX ADMIN — VANCOMYCIN HYDROCHLORIDE 750 MG: 750 INJECTION, POWDER, LYOPHILIZED, FOR SOLUTION INTRAVENOUS at 04:05

## 2021-01-01 RX ADMIN — SODIUM ZIRCONIUM CYCLOSILICATE 5 G: 5 POWDER, FOR SUSPENSION ORAL at 02:05

## 2021-01-01 RX ADMIN — INSULIN DETEMIR 40 UNITS: 100 INJECTION, SOLUTION SUBCUTANEOUS at 10:05

## 2021-01-01 RX ADMIN — MUPIROCIN: 20 OINTMENT TOPICAL at 01:07

## 2021-01-01 RX ADMIN — HEPARIN SODIUM 5000 UNITS: 5000 INJECTION INTRAVENOUS; SUBCUTANEOUS at 02:07

## 2021-01-01 RX ADMIN — SODIUM CHLORIDE: 0.9 INJECTION, SOLUTION INTRAVENOUS at 10:05

## 2021-01-01 RX ADMIN — SODIUM CHLORIDE: 0.9 INJECTION, SOLUTION INTRAVENOUS at 11:06

## 2021-01-01 RX ADMIN — GABAPENTIN 400 MG: 400 CAPSULE ORAL at 08:07

## 2021-01-01 RX ADMIN — INSULIN ASPART 2 UNITS: 100 INJECTION, SOLUTION INTRAVENOUS; SUBCUTANEOUS at 04:07

## 2021-01-01 RX ADMIN — DEXMEDETOMIDINE HYDROCHLORIDE 4 MCG: 100 INJECTION, SOLUTION INTRAVENOUS at 11:05

## 2021-01-01 RX ADMIN — ASPIRIN 81 MG: 81 TABLET, COATED ORAL at 05:05

## 2021-01-01 RX ADMIN — INSULIN DETEMIR 30 UNITS: 100 INJECTION, SOLUTION SUBCUTANEOUS at 05:05

## 2021-01-01 RX ADMIN — ACETAMINOPHEN 500 MG: 500 TABLET ORAL at 05:06

## 2021-01-01 RX ADMIN — CARVEDILOL 6.25 MG: 6.25 TABLET, FILM COATED ORAL at 10:08

## 2021-01-01 RX ADMIN — IOHEXOL 100 ML: 350 INJECTION, SOLUTION INTRAVENOUS at 06:08

## 2021-01-01 RX ADMIN — SODIUM CHLORIDE 100 ML/HR: 0.9 INJECTION, SOLUTION INTRAVENOUS at 09:05

## 2021-01-01 RX ADMIN — SEVELAMER CARBONATE 1600 MG: 800 TABLET, FILM COATED ORAL at 08:06

## 2021-01-01 RX ADMIN — ACETAMINOPHEN 650 MG: 325 TABLET ORAL at 11:07

## 2021-01-01 RX ADMIN — AMLODIPINE BESYLATE 5 MG: 5 TABLET ORAL at 09:06

## 2021-01-01 RX ADMIN — AMLODIPINE BESYLATE 5 MG: 5 TABLET ORAL at 09:11

## 2021-01-01 RX ADMIN — CITALOPRAM HYDROBROMIDE 20 MG: 20 TABLET ORAL at 10:08

## 2021-01-01 RX ADMIN — HEPARIN SODIUM 7500 UNITS: 5000 INJECTION INTRAVENOUS; SUBCUTANEOUS at 03:08

## 2021-01-01 RX ADMIN — HYDRALAZINE HYDROCHLORIDE 25 MG: 25 TABLET, FILM COATED ORAL at 04:05

## 2021-01-01 RX ADMIN — SODIUM ZIRCONIUM CYCLOSILICATE 10 G: 10 POWDER, FOR SUSPENSION ORAL at 09:11

## 2021-01-01 RX ADMIN — OXYCODONE HYDROCHLORIDE 10 MG: 10 TABLET ORAL at 07:07

## 2021-01-01 RX ADMIN — PROPOFOL 30 MG: 10 INJECTION, EMULSION INTRAVENOUS at 01:07

## 2021-01-01 RX ADMIN — HEPARIN SODIUM 7500 UNITS: 5000 INJECTION INTRAVENOUS; SUBCUTANEOUS at 03:07

## 2021-01-01 RX ADMIN — CEFEPIME 3 G: 2 INJECTION, POWDER, FOR SOLUTION INTRAVENOUS at 03:08

## 2021-01-01 RX ADMIN — ACETAMINOPHEN AND CODEINE PHOSPHATE 1 TABLET: 300; 30 TABLET ORAL at 07:05

## 2021-01-01 RX ADMIN — CEFEPIME 1 G: 1 INJECTION, POWDER, FOR SOLUTION INTRAMUSCULAR; INTRAVENOUS at 05:07

## 2021-01-01 RX ADMIN — HYDRALAZINE HYDROCHLORIDE 25 MG: 25 TABLET, FILM COATED ORAL at 09:07

## 2021-01-01 RX ADMIN — CEFEPIME 1 G: 1 INJECTION, POWDER, FOR SOLUTION INTRAMUSCULAR; INTRAVENOUS at 10:06

## 2021-01-01 RX ADMIN — IPRATROPIUM BROMIDE AND ALBUTEROL SULFATE 3 ML: .5; 2.5 SOLUTION RESPIRATORY (INHALATION) at 03:06

## 2021-01-01 RX ADMIN — DOCUSATE SODIUM 50 MG AND SENNOSIDES 8.6 MG 1 TABLET: 8.6; 5 TABLET, FILM COATED ORAL at 03:07

## 2021-01-01 RX ADMIN — ACETAMINOPHEN AND CODEINE PHOSPHATE 1 TABLET: 300; 30 TABLET ORAL at 09:06

## 2021-01-01 RX ADMIN — HEPARIN SODIUM 9000 UNITS: 1000 INJECTION, SOLUTION INTRAVENOUS; SUBCUTANEOUS at 12:05

## 2021-01-01 RX ADMIN — FLUTICASONE PROPIONATE 100 MCG: 50 SPRAY, METERED NASAL at 12:06

## 2021-01-01 RX ADMIN — Medication 50 MCG/KG/MIN: at 01:07

## 2021-01-01 RX ADMIN — IPRATROPIUM BROMIDE AND ALBUTEROL SULFATE 3 ML: .5; 2.5 SOLUTION RESPIRATORY (INHALATION) at 08:08

## 2021-01-01 RX ADMIN — OXYCODONE 5 MG: 5 TABLET ORAL at 10:07

## 2021-01-01 RX ADMIN — CARVEDILOL 25 MG: 25 TABLET, FILM COATED ORAL at 11:07

## 2021-01-01 RX ADMIN — AMLODIPINE BESYLATE 5 MG: 5 TABLET ORAL at 11:07

## 2021-01-01 RX ADMIN — MELATONIN TAB 3 MG 6 MG: 3 TAB at 08:06

## 2021-01-01 RX ADMIN — ATORVASTATIN CALCIUM 40 MG: 20 TABLET, FILM COATED ORAL at 09:11

## 2021-01-01 RX ADMIN — SODIUM CHLORIDE 100 ML/HR: 0.9 INJECTION, SOLUTION INTRAVENOUS at 07:06

## 2021-01-01 RX ADMIN — IOHEXOL 100 ML: 350 INJECTION, SOLUTION INTRAVENOUS at 01:05

## 2021-01-01 RX ADMIN — HYDRALAZINE HYDROCHLORIDE 25 MG: 25 TABLET ORAL at 01:06

## 2021-01-01 RX ADMIN — VANCOMYCIN HYDROCHLORIDE 500 MG: 500 INJECTION, POWDER, LYOPHILIZED, FOR SOLUTION INTRAVENOUS at 03:11

## 2021-01-01 RX ADMIN — ACETAMINOPHEN AND CODEINE PHOSPHATE 2 TABLET: 300; 30 TABLET ORAL at 03:05

## 2021-01-01 RX ADMIN — HEPARIN SODIUM 7500 UNITS: 5000 INJECTION INTRAVENOUS; SUBCUTANEOUS at 08:06

## 2021-01-01 RX ADMIN — DICLOFENAC SODIUM 2 G: 10 GEL TOPICAL at 12:06

## 2021-01-01 RX ADMIN — IPRATROPIUM BROMIDE AND ALBUTEROL SULFATE 3 ML: .5; 2.5 SOLUTION RESPIRATORY (INHALATION) at 05:07

## 2021-01-01 RX ADMIN — PROPOFOL 50 MCG/KG/MIN: 10 INJECTION, EMULSION INTRAVENOUS at 12:06

## 2021-01-01 RX ADMIN — MAGNESIUM OXIDE TAB 400 MG (241.3 MG ELEMENTAL MG) 400 MG: 400 (241.3 MG) TAB at 10:07

## 2021-01-01 RX ADMIN — AMLODIPINE BESYLATE 10 MG: 10 TABLET ORAL at 05:05

## 2021-01-01 RX ADMIN — EPOETIN ALFA-EPBX 6040 UNITS: 4000 INJECTION, SOLUTION INTRAVENOUS; SUBCUTANEOUS at 08:05

## 2021-01-01 RX ADMIN — MELATONIN TAB 3 MG 6 MG: 3 TAB at 10:06

## 2021-01-01 RX ADMIN — DOCUSATE SODIUM 50 MG AND SENNOSIDES 8.6 MG 1 TABLET: 8.6; 5 TABLET, FILM COATED ORAL at 08:11

## 2021-01-01 RX ADMIN — ATORVASTATIN CALCIUM 40 MG: 20 TABLET, FILM COATED ORAL at 04:05

## 2021-01-01 RX ADMIN — HEPARIN SODIUM 7500 UNITS: 5000 INJECTION INTRAVENOUS; SUBCUTANEOUS at 06:07

## 2021-01-01 RX ADMIN — AMLODIPINE BESYLATE 5 MG: 5 TABLET ORAL at 01:06

## 2021-01-01 RX ADMIN — INSULIN ASPART 2 UNITS: 100 INJECTION, SOLUTION INTRAVENOUS; SUBCUTANEOUS at 05:08

## 2021-01-01 RX ADMIN — INSULIN DETEMIR 10 UNITS: 100 INJECTION, SOLUTION SUBCUTANEOUS at 10:05

## 2021-01-01 RX ADMIN — LETROZOLE 2.5 MG: 2.5 TABLET ORAL at 12:06

## 2021-01-01 RX ADMIN — INSULIN ASPART 1 UNITS: 100 INJECTION, SOLUTION INTRAVENOUS; SUBCUTANEOUS at 08:06

## 2021-01-01 RX ADMIN — DOCUSATE SODIUM 50 MG AND SENNOSIDES 8.6 MG 1 TABLET: 8.6; 5 TABLET, FILM COATED ORAL at 10:08

## 2021-01-01 RX ADMIN — FAMOTIDINE 20 MG: 20 TABLET, FILM COATED ORAL at 08:07

## 2021-01-01 RX ADMIN — EPOETIN ALFA-EPBX 4000 UNITS: 4000 INJECTION, SOLUTION INTRAVENOUS; SUBCUTANEOUS at 11:05

## 2021-01-04 ENCOUNTER — PATIENT MESSAGE (OUTPATIENT)
Dept: INFECTIOUS DISEASES | Facility: CLINIC | Age: 66
End: 2021-01-04

## 2021-01-05 ENCOUNTER — OFFICE VISIT (OUTPATIENT)
Dept: PODIATRY | Facility: CLINIC | Age: 66
End: 2021-01-05
Payer: MEDICARE

## 2021-01-05 ENCOUNTER — HOSPITAL ENCOUNTER (OUTPATIENT)
Dept: RADIOLOGY | Facility: HOSPITAL | Age: 66
Discharge: HOME | End: 2021-01-05
Attending: PODIATRIST
Payer: MEDICARE

## 2021-01-05 ENCOUNTER — OFFICE VISIT (OUTPATIENT)
Dept: INFECTIOUS DISEASES | Facility: CLINIC | Age: 66
End: 2021-01-05
Payer: MEDICARE

## 2021-01-05 VITALS — HEART RATE: 80 BPM | SYSTOLIC BLOOD PRESSURE: 137 MMHG | DIASTOLIC BLOOD PRESSURE: 85 MMHG

## 2021-01-05 DIAGNOSIS — L08.9 TOE INFECTION: ICD-10-CM

## 2021-01-05 DIAGNOSIS — N18.6 ESRD (END STAGE RENAL DISEASE) ON DIALYSIS: ICD-10-CM

## 2021-01-05 DIAGNOSIS — M86.071 ACUTE HEMATOGENOUS OSTEOMYELITIS OF RIGHT FOOT: Primary | ICD-10-CM

## 2021-01-05 DIAGNOSIS — M86.071 ACUTE HEMATOGENOUS OSTEOMYELITIS OF RIGHT FOOT: ICD-10-CM

## 2021-01-05 DIAGNOSIS — E11.22 TYPE 2 DIABETES MELLITUS WITH CHRONIC KIDNEY DISEASE ON CHRONIC DIALYSIS, WITH LONG-TERM CURRENT USE OF INSULIN: ICD-10-CM

## 2021-01-05 DIAGNOSIS — Z79.4 TYPE 2 DIABETES MELLITUS WITH CHRONIC KIDNEY DISEASE ON CHRONIC DIALYSIS, WITH LONG-TERM CURRENT USE OF INSULIN: ICD-10-CM

## 2021-01-05 DIAGNOSIS — Z99.2 TYPE 2 DIABETES MELLITUS WITH CHRONIC KIDNEY DISEASE ON CHRONIC DIALYSIS, WITH LONG-TERM CURRENT USE OF INSULIN: ICD-10-CM

## 2021-01-05 DIAGNOSIS — Z99.2 ESRD (END STAGE RENAL DISEASE) ON DIALYSIS: ICD-10-CM

## 2021-01-05 DIAGNOSIS — N18.6 TYPE 2 DIABETES MELLITUS WITH CHRONIC KIDNEY DISEASE ON CHRONIC DIALYSIS, WITH LONG-TERM CURRENT USE OF INSULIN: ICD-10-CM

## 2021-01-05 PROBLEM — Z79.2 RECEIVING INTRAVENOUS ANTIBIOTIC TREATMENT AS OUTPATIENT: Status: RESOLVED | Noted: 2020-10-19 | Resolved: 2021-01-05

## 2021-01-05 PROCEDURE — 99999 PR PBB SHADOW E&M-EST. PATIENT-LVL IV: ICD-10-PCS | Mod: PBBFAC,,, | Performed by: NURSE PRACTITIONER

## 2021-01-05 PROCEDURE — 73630 X-RAY EXAM OF FOOT: CPT | Mod: TC,RT

## 2021-01-05 PROCEDURE — 99999 PR PBB SHADOW E&M-EST. PATIENT-LVL III: ICD-10-PCS | Mod: PBBFAC,,, | Performed by: PODIATRIST

## 2021-01-05 PROCEDURE — 87205 SMEAR GRAM STAIN: CPT

## 2021-01-05 PROCEDURE — 99024 POSTOP FOLLOW-UP VISIT: CPT | Mod: S$GLB,,, | Performed by: PODIATRIST

## 2021-01-05 PROCEDURE — 99999 PR PBB SHADOW E&M-EST. PATIENT-LVL III: CPT | Mod: PBBFAC,,, | Performed by: PODIATRIST

## 2021-01-05 PROCEDURE — 87075 CULTR BACTERIA EXCEPT BLOOD: CPT

## 2021-01-05 PROCEDURE — 99213 OFFICE O/P EST LOW 20 MIN: CPT | Mod: S$GLB,,, | Performed by: NURSE PRACTITIONER

## 2021-01-05 PROCEDURE — 73630 XR FOOT COMPLETE 3 VIEW RIGHT: ICD-10-PCS | Mod: 26,RT,, | Performed by: RADIOLOGY

## 2021-01-05 PROCEDURE — 99024 PR POST-OP FOLLOW-UP VISIT: ICD-10-PCS | Mod: S$GLB,,, | Performed by: PODIATRIST

## 2021-01-05 PROCEDURE — 87070 CULTURE OTHR SPECIMN AEROBIC: CPT

## 2021-01-05 PROCEDURE — 99213 PR OFFICE/OUTPT VISIT, EST, LEVL III, 20-29 MIN: ICD-10-PCS | Mod: S$GLB,,, | Performed by: NURSE PRACTITIONER

## 2021-01-05 PROCEDURE — 73630 X-RAY EXAM OF FOOT: CPT | Mod: 26,RT,, | Performed by: RADIOLOGY

## 2021-01-05 PROCEDURE — 87186 SC STD MICRODIL/AGAR DIL: CPT

## 2021-01-05 PROCEDURE — 99999 PR PBB SHADOW E&M-EST. PATIENT-LVL IV: CPT | Mod: PBBFAC,,, | Performed by: NURSE PRACTITIONER

## 2021-01-05 PROCEDURE — 87077 CULTURE AEROBIC IDENTIFY: CPT

## 2021-01-06 ENCOUNTER — TELEPHONE (OUTPATIENT)
Dept: INFECTIOUS DISEASES | Facility: CLINIC | Age: 66
End: 2021-01-06

## 2021-01-06 LAB
GRAM STN SPEC: NORMAL
GRAM STN SPEC: NORMAL

## 2021-01-08 ENCOUNTER — DOCUMENTATION ONLY (OUTPATIENT)
Dept: INFECTIOUS DISEASES | Facility: HOSPITAL | Age: 66
End: 2021-01-08

## 2021-01-08 LAB — BACTERIA SPEC AEROBE CULT: ABNORMAL

## 2021-01-09 ENCOUNTER — PATIENT MESSAGE (OUTPATIENT)
Dept: INFECTIOUS DISEASES | Facility: CLINIC | Age: 66
End: 2021-01-09

## 2021-01-11 LAB — BACTERIA SPEC ANAEROBE CULT: NORMAL

## 2021-01-12 ENCOUNTER — PATIENT MESSAGE (OUTPATIENT)
Dept: INFECTIOUS DISEASES | Facility: CLINIC | Age: 66
End: 2021-01-12

## 2021-01-12 ENCOUNTER — OFFICE VISIT (OUTPATIENT)
Dept: PODIATRY | Facility: CLINIC | Age: 66
End: 2021-01-12
Payer: MEDICARE

## 2021-01-12 ENCOUNTER — OFFICE VISIT (OUTPATIENT)
Dept: INFECTIOUS DISEASES | Facility: CLINIC | Age: 66
End: 2021-01-12
Payer: MEDICARE

## 2021-01-12 ENCOUNTER — LAB VISIT (OUTPATIENT)
Dept: LAB | Facility: HOSPITAL | Age: 66
End: 2021-01-12
Payer: MEDICARE

## 2021-01-12 DIAGNOSIS — Z99.2 ESRD (END STAGE RENAL DISEASE) ON DIALYSIS: ICD-10-CM

## 2021-01-12 DIAGNOSIS — Z79.2 RECEIVING INTRAVENOUS ANTIBIOTIC TREATMENT AS OUTPATIENT: ICD-10-CM

## 2021-01-12 DIAGNOSIS — S91.301A OPEN WOUND OF PLANTAR ASPECT OF RIGHT FOOT: Primary | ICD-10-CM

## 2021-01-12 DIAGNOSIS — N18.6 ESRD (END STAGE RENAL DISEASE) ON DIALYSIS: ICD-10-CM

## 2021-01-12 DIAGNOSIS — M86.071 ACUTE HEMATOGENOUS OSTEOMYELITIS OF RIGHT FOOT: Primary | ICD-10-CM

## 2021-01-12 DIAGNOSIS — L97.523 ULCERATED, FOOT, LEFT, WITH NECROSIS OF MUSCLE: ICD-10-CM

## 2021-01-12 DIAGNOSIS — E11.22 TYPE 2 DIABETES MELLITUS WITH CHRONIC KIDNEY DISEASE ON CHRONIC DIALYSIS, WITH LONG-TERM CURRENT USE OF INSULIN: ICD-10-CM

## 2021-01-12 DIAGNOSIS — Z99.2 TYPE 2 DIABETES MELLITUS WITH CHRONIC KIDNEY DISEASE ON CHRONIC DIALYSIS, WITH LONG-TERM CURRENT USE OF INSULIN: ICD-10-CM

## 2021-01-12 DIAGNOSIS — Z79.4 TYPE 2 DIABETES MELLITUS WITH CHRONIC KIDNEY DISEASE ON CHRONIC DIALYSIS, WITH LONG-TERM CURRENT USE OF INSULIN: ICD-10-CM

## 2021-01-12 DIAGNOSIS — N18.6 TYPE 2 DIABETES MELLITUS WITH CHRONIC KIDNEY DISEASE ON CHRONIC DIALYSIS, WITH LONG-TERM CURRENT USE OF INSULIN: ICD-10-CM

## 2021-01-12 LAB
BASOPHILS # BLD AUTO: 0.03 K/UL (ref 0–0.2)
BASOPHILS NFR BLD: 0.5 % (ref 0–1.9)
CRP SERPL-MCNC: 29.4 MG/L (ref 0–8.2)
DIFFERENTIAL METHOD: ABNORMAL
EOSINOPHIL # BLD AUTO: 0.1 K/UL (ref 0–0.5)
EOSINOPHIL NFR BLD: 1.4 % (ref 0–8)
ERYTHROCYTE [DISTWIDTH] IN BLOOD BY AUTOMATED COUNT: 18.6 % (ref 11.5–14.5)
HCT VFR BLD AUTO: 39.6 % (ref 37–48.5)
HGB BLD-MCNC: 12 G/DL (ref 12–16)
IMM GRANULOCYTES # BLD AUTO: 0.02 K/UL (ref 0–0.04)
IMM GRANULOCYTES NFR BLD AUTO: 0.3 % (ref 0–0.5)
LYMPHOCYTES # BLD AUTO: 1.1 K/UL (ref 1–4.8)
LYMPHOCYTES NFR BLD: 18.8 % (ref 18–48)
MCH RBC QN AUTO: 27.1 PG (ref 27–31)
MCHC RBC AUTO-ENTMCNC: 30.3 G/DL (ref 32–36)
MCV RBC AUTO: 89 FL (ref 82–98)
MONOCYTES # BLD AUTO: 0.5 K/UL (ref 0.3–1)
MONOCYTES NFR BLD: 8.8 % (ref 4–15)
NEUTROPHILS # BLD AUTO: 4.1 K/UL (ref 1.8–7.7)
NEUTROPHILS NFR BLD: 70.2 % (ref 38–73)
NRBC BLD-RTO: 0 /100 WBC
PLATELET # BLD AUTO: 234 K/UL (ref 150–350)
PMV BLD AUTO: 11.1 FL (ref 9.2–12.9)
RBC # BLD AUTO: 4.43 M/UL (ref 4–5.4)
VANCOMYCIN SERPL-MCNC: 23 UG/ML
WBC # BLD AUTO: 5.79 K/UL (ref 3.9–12.7)

## 2021-01-12 PROCEDURE — 99999 PR PBB SHADOW E&M-EST. PATIENT-LVL I: ICD-10-PCS | Mod: PBBFAC,,, | Performed by: PODIATRIST

## 2021-01-12 PROCEDURE — 99213 OFFICE O/P EST LOW 20 MIN: CPT | Mod: S$GLB,,, | Performed by: NURSE PRACTITIONER

## 2021-01-12 PROCEDURE — 99999 PR PBB SHADOW E&M-EST. PATIENT-LVL I: CPT | Mod: PBBFAC,,, | Performed by: PODIATRIST

## 2021-01-12 PROCEDURE — 99024 POSTOP FOLLOW-UP VISIT: CPT | Mod: S$GLB,,, | Performed by: PODIATRIST

## 2021-01-12 PROCEDURE — 85025 COMPLETE CBC W/AUTO DIFF WBC: CPT

## 2021-01-12 PROCEDURE — 99999 PR PBB SHADOW E&M-EST. PATIENT-LVL IV: CPT | Mod: PBBFAC,,, | Performed by: NURSE PRACTITIONER

## 2021-01-12 PROCEDURE — 99213 PR OFFICE/OUTPT VISIT, EST, LEVL III, 20-29 MIN: ICD-10-PCS | Mod: S$GLB,,, | Performed by: NURSE PRACTITIONER

## 2021-01-12 PROCEDURE — 86140 C-REACTIVE PROTEIN: CPT

## 2021-01-12 PROCEDURE — 80202 ASSAY OF VANCOMYCIN: CPT

## 2021-01-12 PROCEDURE — 99999 PR PBB SHADOW E&M-EST. PATIENT-LVL IV: ICD-10-PCS | Mod: PBBFAC,,, | Performed by: NURSE PRACTITIONER

## 2021-01-12 PROCEDURE — 99024 PR POST-OP FOLLOW-UP VISIT: ICD-10-PCS | Mod: S$GLB,,, | Performed by: PODIATRIST

## 2021-01-14 ENCOUNTER — TELEPHONE (OUTPATIENT)
Dept: PODIATRY | Facility: CLINIC | Age: 66
End: 2021-01-14

## 2021-01-17 ENCOUNTER — PATIENT MESSAGE (OUTPATIENT)
Dept: INFECTIOUS DISEASES | Facility: CLINIC | Age: 66
End: 2021-01-17

## 2021-01-19 ENCOUNTER — TELEPHONE (OUTPATIENT)
Dept: PODIATRY | Facility: CLINIC | Age: 66
End: 2021-01-19

## 2021-01-19 ENCOUNTER — PATIENT MESSAGE (OUTPATIENT)
Dept: PODIATRY | Facility: CLINIC | Age: 66
End: 2021-01-19

## 2021-01-19 ENCOUNTER — PATIENT MESSAGE (OUTPATIENT)
Dept: INFECTIOUS DISEASES | Facility: CLINIC | Age: 66
End: 2021-01-19

## 2021-01-19 DIAGNOSIS — M86.9 OSTEOMYELITIS, UNSPECIFIED SITE, UNSPECIFIED TYPE: Primary | ICD-10-CM

## 2021-01-21 ENCOUNTER — TELEPHONE (OUTPATIENT)
Dept: INFECTIOUS DISEASES | Facility: CLINIC | Age: 66
End: 2021-01-21

## 2021-01-21 DIAGNOSIS — Z79.2 RECEIVING INTRAVENOUS ANTIBIOTIC TREATMENT AS OUTPATIENT: Primary | ICD-10-CM

## 2021-01-24 ENCOUNTER — PATIENT OUTREACH (OUTPATIENT)
Dept: ADMINISTRATIVE | Facility: OTHER | Age: 66
End: 2021-01-24

## 2021-01-26 ENCOUNTER — LAB VISIT (OUTPATIENT)
Dept: LAB | Facility: HOSPITAL | Age: 66
End: 2021-01-26
Payer: MEDICARE

## 2021-01-26 ENCOUNTER — OFFICE VISIT (OUTPATIENT)
Dept: PODIATRY | Facility: CLINIC | Age: 66
End: 2021-01-26
Payer: MEDICARE

## 2021-01-26 VITALS
BODY MASS INDEX: 46.92 KG/M2 | WEIGHT: 273.38 LBS | SYSTOLIC BLOOD PRESSURE: 151 MMHG | DIASTOLIC BLOOD PRESSURE: 86 MMHG | HEART RATE: 96 BPM

## 2021-01-26 DIAGNOSIS — M86.9 OSTEOMYELITIS, UNSPECIFIED SITE, UNSPECIFIED TYPE: ICD-10-CM

## 2021-01-26 DIAGNOSIS — M86.071 ACUTE HEMATOGENOUS OSTEOMYELITIS OF RIGHT FOOT: Primary | ICD-10-CM

## 2021-01-26 DIAGNOSIS — L97.523 ULCERATED, FOOT, LEFT, WITH NECROSIS OF MUSCLE: ICD-10-CM

## 2021-01-26 DIAGNOSIS — Z79.2 RECEIVING INTRAVENOUS ANTIBIOTIC TREATMENT AS OUTPATIENT: ICD-10-CM

## 2021-01-26 LAB
ALBUMIN SERPL BCP-MCNC: 3.3 G/DL (ref 3.5–5.2)
ALP SERPL-CCNC: 84 U/L (ref 55–135)
ALT SERPL W/O P-5'-P-CCNC: 21 U/L (ref 10–44)
ANION GAP SERPL CALC-SCNC: 14 MMOL/L (ref 8–16)
AST SERPL-CCNC: 14 U/L (ref 10–40)
BASOPHILS # BLD AUTO: 0.06 K/UL (ref 0–0.2)
BASOPHILS NFR BLD: 1 % (ref 0–1.9)
BILIRUB SERPL-MCNC: 0.5 MG/DL (ref 0.1–1)
BUN SERPL-MCNC: 38 MG/DL (ref 8–23)
CALCIUM SERPL-MCNC: 9.5 MG/DL (ref 8.7–10.5)
CHLORIDE SERPL-SCNC: 94 MMOL/L (ref 95–110)
CO2 SERPL-SCNC: 24 MMOL/L (ref 23–29)
CREAT SERPL-MCNC: 7.2 MG/DL (ref 0.5–1.4)
CRP SERPL-MCNC: 21.2 MG/L (ref 0–8.2)
DIFFERENTIAL METHOD: ABNORMAL
EOSINOPHIL # BLD AUTO: 0.1 K/UL (ref 0–0.5)
EOSINOPHIL NFR BLD: 2.3 % (ref 0–8)
ERYTHROCYTE [DISTWIDTH] IN BLOOD BY AUTOMATED COUNT: 18.1 % (ref 11.5–14.5)
EST. GFR  (AFRICAN AMERICAN): 6.2 ML/MIN/1.73 M^2
EST. GFR  (NON AFRICAN AMERICAN): 5.4 ML/MIN/1.73 M^2
GLUCOSE SERPL-MCNC: 268 MG/DL (ref 70–110)
HCT VFR BLD AUTO: 37.5 % (ref 37–48.5)
HGB BLD-MCNC: 11.8 G/DL (ref 12–16)
IMM GRANULOCYTES # BLD AUTO: 0.03 K/UL (ref 0–0.04)
IMM GRANULOCYTES NFR BLD AUTO: 0.5 % (ref 0–0.5)
LYMPHOCYTES # BLD AUTO: 1 K/UL (ref 1–4.8)
LYMPHOCYTES NFR BLD: 16.9 % (ref 18–48)
MCH RBC QN AUTO: 27.8 PG (ref 27–31)
MCHC RBC AUTO-ENTMCNC: 31.5 G/DL (ref 32–36)
MCV RBC AUTO: 88 FL (ref 82–98)
MONOCYTES # BLD AUTO: 0.6 K/UL (ref 0.3–1)
MONOCYTES NFR BLD: 9.9 % (ref 4–15)
NEUTROPHILS # BLD AUTO: 4.2 K/UL (ref 1.8–7.7)
NEUTROPHILS NFR BLD: 69.4 % (ref 38–73)
NRBC BLD-RTO: 0 /100 WBC
PLATELET # BLD AUTO: 177 K/UL (ref 150–350)
PMV BLD AUTO: 11.2 FL (ref 9.2–12.9)
POTASSIUM SERPL-SCNC: 4.2 MMOL/L (ref 3.5–5.1)
PROT SERPL-MCNC: 7.5 G/DL (ref 6–8.4)
RBC # BLD AUTO: 4.24 M/UL (ref 4–5.4)
SODIUM SERPL-SCNC: 132 MMOL/L (ref 136–145)
WBC # BLD AUTO: 6.05 K/UL (ref 3.9–12.7)

## 2021-01-26 PROCEDURE — 11043 DBRDMT MUSC&/FSCA 1ST 20/<: CPT | Mod: LT,S$GLB,, | Performed by: PODIATRIST

## 2021-01-26 PROCEDURE — 11043 PR DEBRIDEMENT, SKIN, SUB-Q TISSUE,MUSCLE,=<20 SQ CM: ICD-10-PCS | Mod: LT,S$GLB,, | Performed by: PODIATRIST

## 2021-01-26 PROCEDURE — 99213 PR OFFICE/OUTPT VISIT, EST, LEVL III, 20-29 MIN: ICD-10-PCS | Mod: 25,S$GLB,, | Performed by: PODIATRIST

## 2021-01-26 PROCEDURE — 99213 OFFICE O/P EST LOW 20 MIN: CPT | Mod: 25,S$GLB,, | Performed by: PODIATRIST

## 2021-01-26 PROCEDURE — 99999 PR PBB SHADOW E&M-EST. PATIENT-LVL III: ICD-10-PCS | Mod: PBBFAC,,, | Performed by: PODIATRIST

## 2021-01-26 PROCEDURE — 36415 COLL VENOUS BLD VENIPUNCTURE: CPT

## 2021-01-26 PROCEDURE — 80053 COMPREHEN METABOLIC PANEL: CPT

## 2021-01-26 PROCEDURE — 86140 C-REACTIVE PROTEIN: CPT

## 2021-01-26 PROCEDURE — 99999 PR PBB SHADOW E&M-EST. PATIENT-LVL III: CPT | Mod: PBBFAC,,, | Performed by: PODIATRIST

## 2021-01-26 PROCEDURE — 85025 COMPLETE CBC W/AUTO DIFF WBC: CPT

## 2021-01-27 ENCOUNTER — TELEPHONE (OUTPATIENT)
Dept: INTERNAL MEDICINE | Facility: CLINIC | Age: 66
End: 2021-01-27

## 2021-01-27 DIAGNOSIS — I10 ESSENTIAL HYPERTENSION: Primary | ICD-10-CM

## 2021-01-27 DIAGNOSIS — N18.6 ESRD (END STAGE RENAL DISEASE): ICD-10-CM

## 2021-01-29 PROBLEM — R06.02 SOB (SHORTNESS OF BREATH): Status: ACTIVE | Noted: 2021-01-29

## 2021-01-30 PROBLEM — G93.40 ACUTE ENCEPHALOPATHY: Status: ACTIVE | Noted: 2021-01-30

## 2021-01-30 PROBLEM — R29.6 MULTIPLE FALLS: Status: ACTIVE | Noted: 2021-01-30

## 2021-01-30 PROBLEM — J96.00 ACUTE RESPIRATORY FAILURE: Status: ACTIVE | Noted: 2021-01-30

## 2021-02-01 PROBLEM — J96.02 ACUTE RESPIRATORY FAILURE WITH HYPOXIA AND HYPERCAPNIA: Status: ACTIVE | Noted: 2021-01-30

## 2021-02-01 PROBLEM — J96.01 ACUTE RESPIRATORY FAILURE WITH HYPOXIA AND HYPERCAPNIA: Status: ACTIVE | Noted: 2021-01-30

## 2021-02-04 PROBLEM — K92.2 GI BLEED: Status: ACTIVE | Noted: 2021-02-04

## 2021-02-05 PROBLEM — K92.2 GI BLEED: Status: RESOLVED | Noted: 2021-02-04 | Resolved: 2021-02-05

## 2021-02-05 PROBLEM — J81.1 PULMONARY EDEMA: Status: RESOLVED | Noted: 2018-04-19 | Resolved: 2021-02-05

## 2021-02-05 PROBLEM — R06.02 SOB (SHORTNESS OF BREATH): Status: RESOLVED | Noted: 2021-01-29 | Resolved: 2021-02-05

## 2021-02-05 PROBLEM — R29.6 MULTIPLE FALLS: Status: ACTIVE | Noted: 2021-02-05

## 2021-02-05 PROBLEM — G93.40 ACUTE ENCEPHALOPATHY: Status: RESOLVED | Noted: 2021-01-30 | Resolved: 2021-02-05

## 2021-02-05 PROBLEM — R29.6 MULTIPLE FALLS: Status: RESOLVED | Noted: 2021-01-30 | Resolved: 2021-02-05

## 2021-02-06 PROBLEM — J96.02 ACUTE RESPIRATORY FAILURE WITH HYPOXIA AND HYPERCAPNIA: Status: RESOLVED | Noted: 2021-01-30 | Resolved: 2021-02-06

## 2021-02-06 PROBLEM — J96.01 ACUTE RESPIRATORY FAILURE WITH HYPOXIA AND HYPERCAPNIA: Status: RESOLVED | Noted: 2021-01-30 | Resolved: 2021-02-06

## 2021-02-08 ENCOUNTER — PATIENT OUTREACH (OUTPATIENT)
Dept: ADMINISTRATIVE | Facility: CLINIC | Age: 66
End: 2021-02-08

## 2021-02-09 ENCOUNTER — LAB VISIT (OUTPATIENT)
Dept: LAB | Facility: HOSPITAL | Age: 66
End: 2021-02-09
Attending: INTERNAL MEDICINE
Payer: MEDICARE

## 2021-02-09 ENCOUNTER — OFFICE VISIT (OUTPATIENT)
Dept: INFECTIOUS DISEASES | Facility: CLINIC | Age: 66
End: 2021-02-09
Payer: MEDICARE

## 2021-02-09 ENCOUNTER — OFFICE VISIT (OUTPATIENT)
Dept: PODIATRY | Facility: CLINIC | Age: 66
End: 2021-02-09
Payer: MEDICARE

## 2021-02-09 ENCOUNTER — TELEPHONE (OUTPATIENT)
Dept: INTERNAL MEDICINE | Facility: CLINIC | Age: 66
End: 2021-02-09

## 2021-02-09 VITALS
BODY MASS INDEX: 41.34 KG/M2 | WEIGHT: 239.19 LBS | SYSTOLIC BLOOD PRESSURE: 99 MMHG | DIASTOLIC BLOOD PRESSURE: 66 MMHG | HEART RATE: 82 BPM

## 2021-02-09 DIAGNOSIS — E11.22 TYPE 2 DIABETES MELLITUS WITH CHRONIC KIDNEY DISEASE ON CHRONIC DIALYSIS, WITH LONG-TERM CURRENT USE OF INSULIN: ICD-10-CM

## 2021-02-09 DIAGNOSIS — L97.513 RIGHT FOOT ULCER, WITH NECROSIS OF MUSCLE: ICD-10-CM

## 2021-02-09 DIAGNOSIS — Z79.4 TYPE 2 DIABETES MELLITUS WITH CHRONIC KIDNEY DISEASE ON CHRONIC DIALYSIS, WITH LONG-TERM CURRENT USE OF INSULIN: ICD-10-CM

## 2021-02-09 DIAGNOSIS — S91.301A OPEN WOUND OF PLANTAR ASPECT OF RIGHT FOOT: Primary | ICD-10-CM

## 2021-02-09 DIAGNOSIS — I10 ESSENTIAL HYPERTENSION: ICD-10-CM

## 2021-02-09 DIAGNOSIS — Z99.2 TYPE 2 DIABETES MELLITUS WITH CHRONIC KIDNEY DISEASE ON CHRONIC DIALYSIS, WITH LONG-TERM CURRENT USE OF INSULIN: ICD-10-CM

## 2021-02-09 DIAGNOSIS — Z99.2 ESRD (END STAGE RENAL DISEASE) ON DIALYSIS: ICD-10-CM

## 2021-02-09 DIAGNOSIS — N18.6 TYPE 2 DIABETES MELLITUS WITH CHRONIC KIDNEY DISEASE ON CHRONIC DIALYSIS, WITH LONG-TERM CURRENT USE OF INSULIN: ICD-10-CM

## 2021-02-09 DIAGNOSIS — L97.523 ULCERATED, FOOT, LEFT, WITH NECROSIS OF MUSCLE: Primary | ICD-10-CM

## 2021-02-09 DIAGNOSIS — N18.6 ESRD (END STAGE RENAL DISEASE): ICD-10-CM

## 2021-02-09 DIAGNOSIS — N18.6 ESRD (END STAGE RENAL DISEASE) ON DIALYSIS: ICD-10-CM

## 2021-02-09 DIAGNOSIS — R60.0 LEG EDEMA, RIGHT: ICD-10-CM

## 2021-02-09 LAB
25(OH)D3+25(OH)D2 SERPL-MCNC: 16 NG/ML (ref 30–96)
ALBUMIN SERPL BCP-MCNC: 3.3 G/DL (ref 3.5–5.2)
ALP SERPL-CCNC: 65 U/L (ref 55–135)
ALT SERPL W/O P-5'-P-CCNC: 18 U/L (ref 10–44)
ANION GAP SERPL CALC-SCNC: 16 MMOL/L (ref 8–16)
AST SERPL-CCNC: 15 U/L (ref 10–40)
BASOPHILS # BLD AUTO: 0.04 K/UL (ref 0–0.2)
BASOPHILS NFR BLD: 0.6 % (ref 0–1.9)
BILIRUB SERPL-MCNC: 0.6 MG/DL (ref 0.1–1)
BUN SERPL-MCNC: 46 MG/DL (ref 8–23)
CALCIUM SERPL-MCNC: 8.9 MG/DL (ref 8.7–10.5)
CHLORIDE SERPL-SCNC: 95 MMOL/L (ref 95–110)
CHOLEST SERPL-MCNC: 114 MG/DL (ref 120–199)
CHOLEST/HDLC SERPL: 5 {RATIO} (ref 2–5)
CO2 SERPL-SCNC: 24 MMOL/L (ref 23–29)
CREAT SERPL-MCNC: 6.6 MG/DL (ref 0.5–1.4)
DIFFERENTIAL METHOD: ABNORMAL
EOSINOPHIL # BLD AUTO: 0.1 K/UL (ref 0–0.5)
EOSINOPHIL NFR BLD: 1.5 % (ref 0–8)
ERYTHROCYTE [DISTWIDTH] IN BLOOD BY AUTOMATED COUNT: 18.5 % (ref 11.5–14.5)
EST. GFR  (AFRICAN AMERICAN): 6.9 ML/MIN/1.73 M^2
EST. GFR  (NON AFRICAN AMERICAN): 6 ML/MIN/1.73 M^2
ESTIMATED AVG GLUCOSE: 229 MG/DL (ref 68–131)
GLUCOSE SERPL-MCNC: 194 MG/DL (ref 70–110)
HBA1C MFR BLD: 9.6 % (ref 4–5.6)
HCT VFR BLD AUTO: 34.9 % (ref 37–48.5)
HDLC SERPL-MCNC: 23 MG/DL (ref 40–75)
HDLC SERPL: 20.2 % (ref 20–50)
HGB BLD-MCNC: 10.7 G/DL (ref 12–16)
IMM GRANULOCYTES # BLD AUTO: 0.05 K/UL (ref 0–0.04)
IMM GRANULOCYTES NFR BLD AUTO: 0.8 % (ref 0–0.5)
LDLC SERPL CALC-MCNC: 33.6 MG/DL (ref 63–159)
LYMPHOCYTES # BLD AUTO: 1.3 K/UL (ref 1–4.8)
LYMPHOCYTES NFR BLD: 20.4 % (ref 18–48)
MCH RBC QN AUTO: 27.9 PG (ref 27–31)
MCHC RBC AUTO-ENTMCNC: 30.7 G/DL (ref 32–36)
MCV RBC AUTO: 91 FL (ref 82–98)
MONOCYTES # BLD AUTO: 0.7 K/UL (ref 0.3–1)
MONOCYTES NFR BLD: 11.2 % (ref 4–15)
NEUTROPHILS # BLD AUTO: 4 K/UL (ref 1.8–7.7)
NEUTROPHILS NFR BLD: 65.5 % (ref 38–73)
NONHDLC SERPL-MCNC: 91 MG/DL
NRBC BLD-RTO: 0 /100 WBC
PLATELET # BLD AUTO: 189 K/UL (ref 150–350)
PMV BLD AUTO: 11 FL (ref 9.2–12.9)
POTASSIUM SERPL-SCNC: 5 MMOL/L (ref 3.5–5.1)
PROT SERPL-MCNC: 6.6 G/DL (ref 6–8.4)
RBC # BLD AUTO: 3.84 M/UL (ref 4–5.4)
SODIUM SERPL-SCNC: 135 MMOL/L (ref 136–145)
TRIGL SERPL-MCNC: 287 MG/DL (ref 30–150)
TSH SERPL DL<=0.005 MIU/L-ACNC: 2.52 UIU/ML (ref 0.4–4)
WBC # BLD AUTO: 6.17 K/UL (ref 3.9–12.7)

## 2021-02-09 PROCEDURE — 99499 UNLISTED E&M SERVICE: CPT | Mod: S$GLB,,, | Performed by: PODIATRIST

## 2021-02-09 PROCEDURE — 99213 OFFICE O/P EST LOW 20 MIN: CPT | Mod: S$GLB,,, | Performed by: NURSE PRACTITIONER

## 2021-02-09 PROCEDURE — 82306 VITAMIN D 25 HYDROXY: CPT

## 2021-02-09 PROCEDURE — 80053 COMPREHEN METABOLIC PANEL: CPT

## 2021-02-09 PROCEDURE — 36415 COLL VENOUS BLD VENIPUNCTURE: CPT

## 2021-02-09 PROCEDURE — 83036 HEMOGLOBIN GLYCOSYLATED A1C: CPT

## 2021-02-09 PROCEDURE — 11043 PR DEBRIDEMENT, SKIN, SUB-Q TISSUE,MUSCLE,=<20 SQ CM: ICD-10-PCS | Mod: S$GLB,,, | Performed by: PODIATRIST

## 2021-02-09 PROCEDURE — 99999 PR PBB SHADOW E&M-EST. PATIENT-LVL IV: CPT | Mod: PBBFAC,,, | Performed by: NURSE PRACTITIONER

## 2021-02-09 PROCEDURE — 11043 DBRDMT MUSC&/FSCA 1ST 20/<: CPT | Mod: S$GLB,,, | Performed by: PODIATRIST

## 2021-02-09 PROCEDURE — 84443 ASSAY THYROID STIM HORMONE: CPT

## 2021-02-09 PROCEDURE — 99999 PR PBB SHADOW E&M-EST. PATIENT-LVL IV: ICD-10-PCS | Mod: PBBFAC,,, | Performed by: NURSE PRACTITIONER

## 2021-02-09 PROCEDURE — 99499 NO LOS: ICD-10-PCS | Mod: S$GLB,,, | Performed by: PODIATRIST

## 2021-02-09 PROCEDURE — 80061 LIPID PANEL: CPT

## 2021-02-09 PROCEDURE — 85025 COMPLETE CBC W/AUTO DIFF WBC: CPT

## 2021-02-09 PROCEDURE — 99999 PR PBB SHADOW E&M-EST. PATIENT-LVL IV: ICD-10-PCS | Mod: PBBFAC,,, | Performed by: PODIATRIST

## 2021-02-09 PROCEDURE — 99213 PR OFFICE/OUTPT VISIT, EST, LEVL III, 20-29 MIN: ICD-10-PCS | Mod: S$GLB,,, | Performed by: NURSE PRACTITIONER

## 2021-02-09 PROCEDURE — 99999 PR PBB SHADOW E&M-EST. PATIENT-LVL IV: CPT | Mod: PBBFAC,,, | Performed by: PODIATRIST

## 2021-02-11 ENCOUNTER — TELEPHONE (OUTPATIENT)
Dept: INTERNAL MEDICINE | Facility: CLINIC | Age: 66
End: 2021-02-11

## 2021-02-12 PROBLEM — L08.9 TOE INFECTION: Status: RESOLVED | Noted: 2020-10-13 | Resolved: 2021-02-12

## 2021-02-12 PROBLEM — M86.071 ACUTE HEMATOGENOUS OSTEOMYELITIS OF RIGHT FOOT: Status: RESOLVED | Noted: 2020-11-21 | Resolved: 2021-02-12

## 2021-02-14 PROBLEM — J96.01 ACUTE RESPIRATORY FAILURE WITH HYPOXIA: Status: RESOLVED | Noted: 2021-01-30 | Resolved: 2021-02-14

## 2021-02-14 PROBLEM — E11.628 DIABETIC FOOT INFECTION: Status: ACTIVE | Noted: 2021-02-14

## 2021-02-14 PROBLEM — L08.9 DIABETIC FOOT INFECTION: Status: ACTIVE | Noted: 2021-02-14

## 2021-02-14 PROBLEM — R09.02 HYPOXEMIA: Status: RESOLVED | Noted: 2018-06-08 | Resolved: 2021-02-14

## 2021-02-15 PROBLEM — E87.5 HYPERKALEMIA: Status: RESOLVED | Noted: 2019-02-14 | Resolved: 2021-02-15

## 2021-02-15 PROBLEM — E87.5 HYPERKALEMIA: Status: ACTIVE | Noted: 2021-02-15

## 2021-02-19 PROBLEM — E87.5 HYPERKALEMIA: Status: RESOLVED | Noted: 2021-02-15 | Resolved: 2021-02-19

## 2021-03-12 ENCOUNTER — PATIENT OUTREACH (OUTPATIENT)
Dept: ADMINISTRATIVE | Facility: OTHER | Age: 66
End: 2021-03-12

## 2021-03-12 DIAGNOSIS — Z12.31 BREAST CANCER SCREENING BY MAMMOGRAM: Primary | ICD-10-CM

## 2021-03-15 ENCOUNTER — TELEPHONE (OUTPATIENT)
Dept: VASCULAR SURGERY | Facility: CLINIC | Age: 66
End: 2021-03-15

## 2021-03-16 ENCOUNTER — TELEPHONE (OUTPATIENT)
Dept: PODIATRY | Facility: CLINIC | Age: 66
End: 2021-03-16

## 2021-03-18 ENCOUNTER — PATIENT MESSAGE (OUTPATIENT)
Dept: RESEARCH | Facility: HOSPITAL | Age: 66
End: 2021-03-18

## 2021-03-19 ENCOUNTER — TELEPHONE (OUTPATIENT)
Dept: NEUROLOGY | Facility: CLINIC | Age: 66
End: 2021-03-19

## 2021-03-19 ENCOUNTER — TELEPHONE (OUTPATIENT)
Dept: DIABETES | Facility: CLINIC | Age: 66
End: 2021-03-19

## 2021-03-22 ENCOUNTER — TELEPHONE (OUTPATIENT)
Dept: NEUROLOGY | Facility: CLINIC | Age: 66
End: 2021-03-22

## 2021-03-26 ENCOUNTER — PATIENT MESSAGE (OUTPATIENT)
Dept: RESEARCH | Facility: HOSPITAL | Age: 66
End: 2021-03-26

## 2021-03-29 ENCOUNTER — TELEPHONE (OUTPATIENT)
Dept: PODIATRY | Facility: CLINIC | Age: 66
End: 2021-03-29

## 2021-03-30 ENCOUNTER — TELEPHONE (OUTPATIENT)
Dept: PODIATRY | Facility: CLINIC | Age: 66
End: 2021-03-30

## 2021-04-28 NOTE — TELEPHONE ENCOUNTER
Called patient regarding medication to see if her insurance covered the med. Patient didn't answer a message was left on vm advising the pt to give the office a call.    .

## 2021-04-28 NOTE — TELEPHONE ENCOUNTER
Please inform patient that the prescription for dexcom has been sent to her pharmacy electronically.  Ask patient to let us know if it is covered.

## 2021-05-10 PROBLEM — L03.116 CELLULITIS OF LEG, LEFT: Status: ACTIVE | Noted: 2021-01-01

## 2021-05-10 PROBLEM — L03.90 CELLULITIS: Status: ACTIVE | Noted: 2021-01-01

## 2021-05-11 PROBLEM — N25.81 SECONDARY HYPERPARATHYROIDISM: Status: ACTIVE | Noted: 2021-01-01

## 2021-05-23 PROBLEM — R53.83 FATIGUE: Status: ACTIVE | Noted: 2021-01-01

## 2021-05-28 PROBLEM — L03.116 CELLULITIS OF LEG, LEFT: Status: RESOLVED | Noted: 2021-01-01 | Resolved: 2021-01-01

## 2021-05-29 PROBLEM — E55.9 VITAMIN D DEFICIENCY: Status: ACTIVE | Noted: 2021-01-01

## 2021-06-03 PROBLEM — R50.82 POST-PROCEDURAL FEVER: Status: ACTIVE | Noted: 2021-01-01

## 2021-06-07 PROBLEM — R50.9 FEVER: Status: ACTIVE | Noted: 2021-01-01

## 2021-06-07 PROBLEM — N18.9 ACUTE RENAL FAILURE SUPERIMPOSED ON CHRONIC KIDNEY DISEASE: Status: ACTIVE | Noted: 2018-04-19

## 2021-06-11 PROBLEM — R50.9 FEVER: Status: RESOLVED | Noted: 2021-01-01 | Resolved: 2021-01-01

## 2021-06-21 PROBLEM — F41.9 ANXIETY: Status: ACTIVE | Noted: 2021-01-01

## 2021-06-21 PROBLEM — M19.90 OSTEOARTHRITIS: Status: ACTIVE | Noted: 2019-02-13
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
## 2021-07-14 PROBLEM — M89.9 CHRONIC KIDNEY DISEASE-MINERAL AND BONE DISORDER: Status: ACTIVE | Noted: 2021-01-01

## 2021-07-14 PROBLEM — L08.9 FOOT INFECTION: Status: ACTIVE | Noted: 2020-11-21

## 2021-07-14 PROBLEM — N18.9 CHRONIC KIDNEY DISEASE-MINERAL AND BONE DISORDER: Status: ACTIVE | Noted: 2021-01-01

## 2021-07-14 PROBLEM — E83.9 CHRONIC KIDNEY DISEASE-MINERAL AND BONE DISORDER: Status: ACTIVE | Noted: 2021-01-01

## 2021-07-14 PROBLEM — M86.272 SUBACUTE OSTEOMYELITIS OF LEFT FOOT: Status: ACTIVE | Noted: 2021-01-01

## 2021-07-16 PROBLEM — Z75.8 DISCHARGE PLANNING ISSUES: Status: ACTIVE | Noted: 2021-01-01

## 2021-07-18 PROBLEM — F17.200 SMOKING: Status: ACTIVE | Noted: 2021-01-01

## 2021-07-20 PROBLEM — Z74.09 IMPAIRED MOBILITY AND ACTIVITIES OF DAILY LIVING: Status: ACTIVE | Noted: 2021-01-01

## 2021-07-20 PROBLEM — Z78.9 IMPAIRED MOBILITY AND ACTIVITIES OF DAILY LIVING: Status: ACTIVE | Noted: 2021-01-01

## 2021-07-21 PROBLEM — Z99.2 ESRD ON DIALYSIS: Status: ACTIVE | Noted: 2019-02-14

## 2021-07-26 PROBLEM — D62 ACUTE BLOOD LOSS ANEMIA: Status: ACTIVE | Noted: 2019-02-14

## 2021-08-02 PROBLEM — N18.6 ANEMIA IN ESRD (END-STAGE RENAL DISEASE): Status: ACTIVE | Noted: 2021-01-01

## 2021-08-02 PROBLEM — D63.1 ANEMIA IN ESRD (END-STAGE RENAL DISEASE): Status: ACTIVE | Noted: 2021-01-01

## 2021-08-05 PROBLEM — R09.02 HYPOXIA: Status: ACTIVE | Noted: 2021-01-01

## 2021-08-05 PROBLEM — R09.02 HYPOXIA: Chronic | Status: ACTIVE | Noted: 2021-01-01

## 2021-08-06 PROBLEM — J96.90 TYPE 1 RESPIRATORY FAILURE: Status: ACTIVE | Noted: 2021-01-01

## 2021-08-06 PROBLEM — N25.81 SECONDARY HYPERPARATHYROIDISM: Chronic | Status: ACTIVE | Noted: 2021-01-01

## 2021-08-06 PROBLEM — S98.912A AMPUTATION OF LEFT FOOT: Status: ACTIVE | Noted: 2021-01-01

## 2021-08-06 PROBLEM — J44.9 COPD (CHRONIC OBSTRUCTIVE PULMONARY DISEASE): Chronic | Status: ACTIVE | Noted: 2018-06-08

## 2021-08-06 PROBLEM — J96.90 TYPE 1 RESPIRATORY FAILURE: Chronic | Status: ACTIVE | Noted: 2021-01-01

## 2021-08-06 PROBLEM — J96.21 ACUTE ON CHRONIC RESPIRATORY FAILURE WITH HYPOXIA: Status: ACTIVE | Noted: 2021-01-01

## 2021-08-06 PROBLEM — Z86.73 HISTORY OF CEREBROVASCULAR ACCIDENT: Chronic | Status: ACTIVE | Noted: 2018-06-08

## 2021-08-06 PROBLEM — E66.01 MORBID OBESITY: Chronic | Status: ACTIVE | Noted: 2017-03-31

## 2021-08-09 PROBLEM — I48.91 ATRIAL FIBRILLATION WITH RVR: Status: ACTIVE | Noted: 2021-01-01

## 2021-08-17 PROBLEM — T81.30XA WOUND DEHISCENCE: Status: ACTIVE | Noted: 2021-01-01

## 2021-08-17 PROBLEM — I48.0 PAROXYSMAL ATRIAL FIBRILLATION: Status: ACTIVE | Noted: 2021-01-01

## 2021-08-17 PROBLEM — M86.372 CHRONIC MULTIFOCAL OSTEOMYELITIS OF LEFT FOOT: Status: ACTIVE | Noted: 2021-01-01

## 2021-11-03 PROBLEM — J18.9 PNEUMONIA: Status: ACTIVE | Noted: 2021-01-01

## 2021-11-03 PROBLEM — J96.20 ACUTE ON CHRONIC RESPIRATORY FAILURE: Status: ACTIVE | Noted: 2021-01-30

## 2021-11-03 PROBLEM — G93.41 ENCEPHALOPATHY, METABOLIC: Status: ACTIVE | Noted: 2021-01-01

## 2021-11-04 PROBLEM — G93.41 ENCEPHALOPATHY, METABOLIC: Status: RESOLVED | Noted: 2021-01-01 | Resolved: 2021-01-01

## 2021-11-25 PROBLEM — R53.81 PHYSICAL DEBILITY: Status: ACTIVE | Noted: 2021-01-01

## 2021-12-12 PROBLEM — Z87.39 HISTORY OF OSTEOMYELITIS: Status: ACTIVE | Noted: 2021-01-01

## 2021-12-12 PROBLEM — F32.9 MAJOR DEPRESSIVE DISORDER: Status: ACTIVE | Noted: 2019-02-14

## 2021-12-29 NOTE — TELEPHONE ENCOUNTER
Called patient and informed to go to ED per barbie rucker. Patient stated not a good day for this and didn't take her shot this morning and had a piece of pie last night. Informed her because of her glucose level should go to ED per provider. She stated she will call us back and let us know what she is doing.  
Called patient with no answer. Left voicemail of sindy cole seeing results as well and informing that she also states to go straight to ED for elevated glucose and to call us back to let us know she is on her way.  
Received call from lab re critical glucose value of 486  K 4.8    Attempted to call patient.  No answer.  Left voicemail that she is to go to ED.  Will continue to try.     
Received message voicemail from patient that she has been checking her glucose at home, and it has come down.  She reports she is not going to the emergency room.  She will check in tomorrow.   
APER

## 2022-01-01 ENCOUNTER — ANESTHESIA EVENT (OUTPATIENT)
Dept: INTENSIVE CARE | Facility: HOSPITAL | Age: 67
DRG: 283 | End: 2022-01-01
Payer: MEDICARE

## 2022-01-01 ENCOUNTER — DOCUMENT SCAN (OUTPATIENT)
Dept: HOME HEALTH SERVICES | Facility: HOSPITAL | Age: 67
End: 2022-01-01
Payer: MEDICARE

## 2022-01-01 ENCOUNTER — HOSPITAL ENCOUNTER (INPATIENT)
Facility: HOSPITAL | Age: 67
LOS: 3 days | Discharge: HOME HEALTH | DRG: 640 | End: 2022-02-20
Attending: EMERGENCY MEDICINE | Admitting: INTERNAL MEDICINE
Payer: MEDICARE

## 2022-01-01 ENCOUNTER — TELEPHONE (OUTPATIENT)
Dept: INTERNAL MEDICINE | Facility: CLINIC | Age: 67
End: 2022-01-01
Payer: MEDICARE

## 2022-01-01 ENCOUNTER — TELEPHONE (OUTPATIENT)
Dept: ELECTROPHYSIOLOGY | Facility: CLINIC | Age: 67
End: 2022-01-01
Payer: MEDICARE

## 2022-01-01 ENCOUNTER — PES CALL (OUTPATIENT)
Dept: ADMINISTRATIVE | Facility: CLINIC | Age: 67
End: 2022-01-01
Payer: MEDICARE

## 2022-01-01 ENCOUNTER — PES CALL (OUTPATIENT)
Dept: HOME HEALTH SERVICES | Facility: CLINIC | Age: 67
End: 2022-01-01
Payer: MEDICARE

## 2022-01-01 ENCOUNTER — PATIENT OUTREACH (OUTPATIENT)
Dept: ADMINISTRATIVE | Facility: CLINIC | Age: 67
End: 2022-01-01
Payer: MEDICARE

## 2022-01-01 ENCOUNTER — OFFICE VISIT (OUTPATIENT)
Dept: INTERNAL MEDICINE | Facility: CLINIC | Age: 67
End: 2022-01-01
Payer: MEDICARE

## 2022-01-01 ENCOUNTER — HOSPITAL ENCOUNTER (OUTPATIENT)
Facility: HOSPITAL | Age: 67
Discharge: HOME HEALTH | End: 2022-03-07
Attending: EMERGENCY MEDICINE | Admitting: INTERNAL MEDICINE
Payer: MEDICARE

## 2022-01-01 ENCOUNTER — OFFICE VISIT (OUTPATIENT)
Dept: CARDIOLOGY | Facility: CLINIC | Age: 67
End: 2022-01-01
Payer: MEDICARE

## 2022-01-01 ENCOUNTER — PATIENT MESSAGE (OUTPATIENT)
Dept: INTERNAL MEDICINE | Facility: CLINIC | Age: 67
End: 2022-01-01
Payer: MEDICARE

## 2022-01-01 ENCOUNTER — TELEPHONE (OUTPATIENT)
Dept: INTERNAL MEDICINE | Facility: CLINIC | Age: 67
End: 2022-01-01

## 2022-01-01 ENCOUNTER — OFFICE VISIT (OUTPATIENT)
Dept: ELECTROPHYSIOLOGY | Facility: CLINIC | Age: 67
End: 2022-01-01
Payer: MEDICARE

## 2022-01-01 ENCOUNTER — HOSPITAL ENCOUNTER (INPATIENT)
Facility: HOSPITAL | Age: 67
LOS: 7 days | DRG: 283 | End: 2022-04-06
Attending: EMERGENCY MEDICINE | Admitting: STUDENT IN AN ORGANIZED HEALTH CARE EDUCATION/TRAINING PROGRAM
Payer: MEDICARE

## 2022-01-01 ENCOUNTER — PATIENT MESSAGE (OUTPATIENT)
Dept: ELECTROPHYSIOLOGY | Facility: CLINIC | Age: 67
End: 2022-01-01
Payer: MEDICARE

## 2022-01-01 ENCOUNTER — LAB VISIT (OUTPATIENT)
Dept: LAB | Facility: OTHER | Age: 67
End: 2022-01-01
Payer: MEDICARE

## 2022-01-01 ENCOUNTER — TELEPHONE (OUTPATIENT)
Dept: CARDIAC REHAB | Facility: CLINIC | Age: 67
End: 2022-01-01
Payer: MEDICARE

## 2022-01-01 ENCOUNTER — PATIENT OUTREACH (OUTPATIENT)
Dept: ADMINISTRATIVE | Facility: OTHER | Age: 67
End: 2022-01-01
Payer: MEDICARE

## 2022-01-01 ENCOUNTER — ANESTHESIA (OUTPATIENT)
Dept: INTENSIVE CARE | Facility: HOSPITAL | Age: 67
DRG: 283 | End: 2022-01-01
Payer: MEDICARE

## 2022-01-01 ENCOUNTER — HOSPITAL ENCOUNTER (INPATIENT)
Facility: HOSPITAL | Age: 67
LOS: 1 days | Discharge: HOME | DRG: 246 | End: 2022-02-08
Attending: EMERGENCY MEDICINE | Admitting: STUDENT IN AN ORGANIZED HEALTH CARE EDUCATION/TRAINING PROGRAM
Payer: MEDICARE

## 2022-01-01 ENCOUNTER — EXTERNAL HOME HEALTH (OUTPATIENT)
Dept: HOME HEALTH SERVICES | Facility: HOSPITAL | Age: 67
End: 2022-01-01
Payer: MEDICARE

## 2022-01-01 ENCOUNTER — HOSPITAL ENCOUNTER (OUTPATIENT)
Dept: CARDIOLOGY | Facility: CLINIC | Age: 67
Discharge: HOME | End: 2022-01-11
Payer: MEDICARE

## 2022-01-01 VITALS
HEART RATE: 61 BPM | BODY MASS INDEX: 40.34 KG/M2 | HEIGHT: 64 IN | DIASTOLIC BLOOD PRESSURE: 62 MMHG | RESPIRATION RATE: 18 BRPM | OXYGEN SATURATION: 93 % | SYSTOLIC BLOOD PRESSURE: 137 MMHG | WEIGHT: 236.31 LBS | TEMPERATURE: 98 F

## 2022-01-01 VITALS
DIASTOLIC BLOOD PRESSURE: 88 MMHG | WEIGHT: 235.88 LBS | TEMPERATURE: 98 F | HEIGHT: 64 IN | SYSTOLIC BLOOD PRESSURE: 194 MMHG | HEART RATE: 60 BPM | BODY MASS INDEX: 40.27 KG/M2 | RESPIRATION RATE: 16 BRPM | OXYGEN SATURATION: 92 %

## 2022-01-01 VITALS
SYSTOLIC BLOOD PRESSURE: 86 MMHG | WEIGHT: 209.88 LBS | TEMPERATURE: 99 F | DIASTOLIC BLOOD PRESSURE: 37 MMHG | BODY MASS INDEX: 35.83 KG/M2 | OXYGEN SATURATION: 72 % | RESPIRATION RATE: 4 BRPM | HEIGHT: 64 IN

## 2022-01-01 VITALS
SYSTOLIC BLOOD PRESSURE: 122 MMHG | DIASTOLIC BLOOD PRESSURE: 62 MMHG | HEIGHT: 60 IN | WEIGHT: 218.94 LBS | HEART RATE: 81 BPM | BODY MASS INDEX: 42.98 KG/M2

## 2022-01-01 VITALS
HEART RATE: 69 BPM | SYSTOLIC BLOOD PRESSURE: 109 MMHG | HEART RATE: 66 BPM | BODY MASS INDEX: 39.14 KG/M2 | DIASTOLIC BLOOD PRESSURE: 60 MMHG | BODY MASS INDEX: 36.89 KG/M2 | HEIGHT: 64 IN | WEIGHT: 229.25 LBS | RESPIRATION RATE: 18 BRPM | WEIGHT: 216.06 LBS | HEIGHT: 64 IN | SYSTOLIC BLOOD PRESSURE: 108 MMHG | TEMPERATURE: 98 F | OXYGEN SATURATION: 99 % | OXYGEN SATURATION: 99 % | DIASTOLIC BLOOD PRESSURE: 62 MMHG

## 2022-01-01 VITALS
HEART RATE: 61 BPM | BODY MASS INDEX: 38.58 KG/M2 | SYSTOLIC BLOOD PRESSURE: 149 MMHG | DIASTOLIC BLOOD PRESSURE: 67 MMHG | RESPIRATION RATE: 18 BRPM | HEIGHT: 64 IN | TEMPERATURE: 99 F | OXYGEN SATURATION: 99 % | WEIGHT: 226 LBS
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
## 2022-01-01 DIAGNOSIS — I48.91 ATRIAL FIBRILLATION, UNSPECIFIED TYPE: ICD-10-CM

## 2022-01-01 DIAGNOSIS — I21.4 NSTEMI (NON-ST ELEVATED MYOCARDIAL INFARCTION): ICD-10-CM

## 2022-01-01 DIAGNOSIS — Z79.4 TYPE 2 DIABETES MELLITUS WITH CHRONIC KIDNEY DISEASE ON CHRONIC DIALYSIS, WITH LONG-TERM CURRENT USE OF INSULIN: ICD-10-CM

## 2022-01-01 DIAGNOSIS — R06.02 SOB (SHORTNESS OF BREATH): ICD-10-CM

## 2022-01-01 DIAGNOSIS — Z99.2 TYPE 2 DIABETES MELLITUS WITH CHRONIC KIDNEY DISEASE ON CHRONIC DIALYSIS, WITH LONG-TERM CURRENT USE OF INSULIN: ICD-10-CM

## 2022-01-01 DIAGNOSIS — I48.91 ATRIAL FIBRILLATION: ICD-10-CM

## 2022-01-01 DIAGNOSIS — I50.33 ACUTE ON CHRONIC DIASTOLIC HEART FAILURE: ICD-10-CM

## 2022-01-01 DIAGNOSIS — R06.02 SHORTNESS OF BREATH: ICD-10-CM

## 2022-01-01 DIAGNOSIS — I73.9 PERIPHERAL ARTERY DISEASE: ICD-10-CM

## 2022-01-01 DIAGNOSIS — E87.5 HYPERKALEMIA: Primary | ICD-10-CM

## 2022-01-01 DIAGNOSIS — I48.91 ATRIAL FIBRILLATION, UNSPECIFIED TYPE: Primary | ICD-10-CM

## 2022-01-01 DIAGNOSIS — I10 PRIMARY HYPERTENSION: Primary | ICD-10-CM

## 2022-01-01 DIAGNOSIS — I25.10 CORONARY ARTERY DISEASE INVOLVING NATIVE CORONARY ARTERY OF NATIVE HEART WITHOUT ANGINA PECTORIS: ICD-10-CM

## 2022-01-01 DIAGNOSIS — D63.1 ANEMIA IN ESRD (END-STAGE RENAL DISEASE): ICD-10-CM

## 2022-01-01 DIAGNOSIS — I48.19 PERSISTENT ATRIAL FIBRILLATION: ICD-10-CM

## 2022-01-01 DIAGNOSIS — N18.6 END-STAGE RENAL DISEASE ON HEMODIALYSIS: Primary | Chronic | ICD-10-CM

## 2022-01-01 DIAGNOSIS — E78.49 OTHER HYPERLIPIDEMIA: ICD-10-CM

## 2022-01-01 DIAGNOSIS — I21.4 NSTEMI (NON-ST ELEVATED MYOCARDIAL INFARCTION): Primary | ICD-10-CM

## 2022-01-01 DIAGNOSIS — N18.6 ANEMIA IN ESRD (END-STAGE RENAL DISEASE): ICD-10-CM

## 2022-01-01 DIAGNOSIS — E11.52 TYPE 2 DIABETES MELLITUS WITH DIABETIC PERIPHERAL ANGIOPATHY AND GANGRENE, WITH LONG-TERM CURRENT USE OF INSULIN: Chronic | ICD-10-CM

## 2022-01-01 DIAGNOSIS — R79.89 ELEVATED TROPONIN: ICD-10-CM

## 2022-01-01 DIAGNOSIS — N18.6 END-STAGE RENAL DISEASE ON HEMODIALYSIS: Chronic | ICD-10-CM

## 2022-01-01 DIAGNOSIS — Z99.2 END-STAGE RENAL DISEASE ON HEMODIALYSIS: Primary | Chronic | ICD-10-CM

## 2022-01-01 DIAGNOSIS — Z99.2 ESRD ON HEMODIALYSIS: ICD-10-CM

## 2022-01-01 DIAGNOSIS — E66.01 MORBID OBESITY: ICD-10-CM

## 2022-01-01 DIAGNOSIS — J96.21 ACUTE ON CHRONIC RESPIRATORY FAILURE WITH HYPOXIA: ICD-10-CM

## 2022-01-01 DIAGNOSIS — I10 PRIMARY HYPERTENSION: ICD-10-CM

## 2022-01-01 DIAGNOSIS — I48.0 PAF (PAROXYSMAL ATRIAL FIBRILLATION): ICD-10-CM

## 2022-01-01 DIAGNOSIS — Z20.822 ENCOUNTER FOR LABORATORY TESTING FOR COVID-19 VIRUS: ICD-10-CM

## 2022-01-01 DIAGNOSIS — J43.8 OTHER EMPHYSEMA: ICD-10-CM

## 2022-01-01 DIAGNOSIS — Z99.2 ESRD (END STAGE RENAL DISEASE) ON DIALYSIS: ICD-10-CM

## 2022-01-01 DIAGNOSIS — I87.2 CHRONIC VENOUS INSUFFICIENCY: ICD-10-CM

## 2022-01-01 DIAGNOSIS — Z86.79 HISTORY OF INTRACRANIAL HEMORRHAGE: ICD-10-CM

## 2022-01-01 DIAGNOSIS — Z23 NEED FOR 23-POLYVALENT PNEUMOCOCCAL POLYSACCHARIDE VACCINE: ICD-10-CM

## 2022-01-01 DIAGNOSIS — Z99.2 END-STAGE RENAL DISEASE ON HEMODIALYSIS: Primary | ICD-10-CM

## 2022-01-01 DIAGNOSIS — R57.9 SHOCK: ICD-10-CM

## 2022-01-01 DIAGNOSIS — I48.91 A-FIB: ICD-10-CM

## 2022-01-01 DIAGNOSIS — R07.9 CHEST PAIN: ICD-10-CM

## 2022-01-01 DIAGNOSIS — Z99.81 CHRONIC RESPIRATORY FAILURE WITH HYPOXIA, ON HOME OXYGEN THERAPY: Chronic | ICD-10-CM

## 2022-01-01 DIAGNOSIS — E87.5 HYPERKALEMIA: ICD-10-CM

## 2022-01-01 DIAGNOSIS — N18.6 ESRD (END STAGE RENAL DISEASE) ON DIALYSIS: ICD-10-CM

## 2022-01-01 DIAGNOSIS — J96.20 ACUTE ON CHRONIC RESPIRATORY FAILURE: ICD-10-CM

## 2022-01-01 DIAGNOSIS — I50.43 ACUTE ON CHRONIC COMBINED SYSTOLIC AND DIASTOLIC HEART FAILURE: ICD-10-CM

## 2022-01-01 DIAGNOSIS — I25.10 CORONARY ARTERY DISEASE: ICD-10-CM

## 2022-01-01 DIAGNOSIS — J96.11 CHRONIC RESPIRATORY FAILURE WITH HYPOXIA: ICD-10-CM

## 2022-01-01 DIAGNOSIS — Z99.2 END-STAGE RENAL DISEASE ON HEMODIALYSIS: Chronic | ICD-10-CM

## 2022-01-01 DIAGNOSIS — N18.6 END-STAGE RENAL DISEASE ON HEMODIALYSIS: Primary | ICD-10-CM

## 2022-01-01 DIAGNOSIS — Z01.89 ENCOUNTER FOR OTHER SPECIFIED SPECIAL EXAMINATIONS: ICD-10-CM

## 2022-01-01 DIAGNOSIS — N18.6 ESRD ON HEMODIALYSIS: ICD-10-CM

## 2022-01-01 DIAGNOSIS — I10 HYPERTENSION, UNSPECIFIED TYPE: ICD-10-CM

## 2022-01-01 DIAGNOSIS — Z79.4 TYPE 2 DIABETES MELLITUS WITH DIABETIC PERIPHERAL ANGIOPATHY AND GANGRENE, WITH LONG-TERM CURRENT USE OF INSULIN: Chronic | ICD-10-CM

## 2022-01-01 DIAGNOSIS — R29.6 MULTIPLE FALLS: ICD-10-CM

## 2022-01-01 DIAGNOSIS — Z98.61 POSTSURGICAL PERCUTANEOUS TRANSLUMINAL CORONARY ANGIOPLASTY STATUS: Primary | ICD-10-CM

## 2022-01-01 DIAGNOSIS — M54.50 LOW BACK PAIN, UNSPECIFIED BACK PAIN LATERALITY, UNSPECIFIED CHRONICITY, UNSPECIFIED WHETHER SCIATICA PRESENT: ICD-10-CM

## 2022-01-01 DIAGNOSIS — J96.11 CHRONIC RESPIRATORY FAILURE WITH HYPOXIA, ON HOME OXYGEN THERAPY: Chronic | ICD-10-CM

## 2022-01-01 DIAGNOSIS — E11.22 TYPE 2 DIABETES MELLITUS WITH CHRONIC KIDNEY DISEASE ON CHRONIC DIALYSIS, WITH LONG-TERM CURRENT USE OF INSULIN: ICD-10-CM

## 2022-01-01 DIAGNOSIS — I65.23 BILATERAL CAROTID ARTERY STENOSIS: ICD-10-CM

## 2022-01-01 DIAGNOSIS — N18.6 TYPE 2 DIABETES MELLITUS WITH CHRONIC KIDNEY DISEASE ON CHRONIC DIALYSIS, WITH LONG-TERM CURRENT USE OF INSULIN: ICD-10-CM

## 2022-01-01 LAB
ABO + RH BLD: NORMAL
ABO + RH BLD: NORMAL
ALBUMIN SERPL BCP-MCNC: 2.9 G/DL (ref 3.5–5.2)
ALBUMIN SERPL BCP-MCNC: 3 G/DL (ref 3.5–5.2)
ALBUMIN SERPL BCP-MCNC: 3.1 G/DL (ref 3.5–5.2)
ALBUMIN SERPL BCP-MCNC: 3.2 G/DL (ref 3.5–5.2)
ALBUMIN SERPL BCP-MCNC: 3.2 G/DL (ref 3.5–5.2)
ALBUMIN SERPL BCP-MCNC: 3.3 G/DL (ref 3.5–5.2)
ALBUMIN SERPL BCP-MCNC: 3.4 G/DL (ref 3.5–5.2)
ALBUMIN SERPL BCP-MCNC: 3.5 G/DL (ref 3.5–5.2)
ALBUMIN SERPL BCP-MCNC: 3.6 G/DL (ref 3.5–5.2)
ALBUMIN SERPL BCP-MCNC: 3.8 G/DL (ref 3.5–5.2)
ALBUMIN SERPL BCP-MCNC: 3.9 G/DL (ref 3.5–5.2)
ALLENS TEST: ABNORMAL
ALLENS TEST: NORMAL
ALP SERPL-CCNC: 103 U/L (ref 55–135)
ALP SERPL-CCNC: 113 U/L (ref 55–135)
ALP SERPL-CCNC: 118 U/L (ref 55–135)
ALP SERPL-CCNC: 120 U/L (ref 55–135)
ALP SERPL-CCNC: 123 U/L (ref 55–135)
ALP SERPL-CCNC: 77 U/L (ref 55–135)
ALP SERPL-CCNC: 77 U/L (ref 55–135)
ALP SERPL-CCNC: 79 U/L (ref 55–135)
ALP SERPL-CCNC: 80 U/L (ref 55–135)
ALP SERPL-CCNC: 80 U/L (ref 55–135)
ALP SERPL-CCNC: 83 U/L (ref 55–135)
ALP SERPL-CCNC: 89 U/L (ref 55–135)
ALP SERPL-CCNC: 93 U/L (ref 55–135)
ALP SERPL-CCNC: 98 U/L (ref 55–135)
ALT SERPL W/O P-5'-P-CCNC: 18 U/L (ref 10–44)
ALT SERPL W/O P-5'-P-CCNC: 20 U/L (ref 10–44)
ALT SERPL W/O P-5'-P-CCNC: 21 U/L (ref 10–44)
ALT SERPL W/O P-5'-P-CCNC: 21 U/L (ref 10–44)
ALT SERPL W/O P-5'-P-CCNC: 23 U/L (ref 10–44)
ALT SERPL W/O P-5'-P-CCNC: 27 U/L (ref 10–44)
ALT SERPL W/O P-5'-P-CCNC: 28 U/L (ref 10–44)
ALT SERPL W/O P-5'-P-CCNC: 32 U/L (ref 10–44)
ALT SERPL W/O P-5'-P-CCNC: 5 U/L (ref 10–44)
ALT SERPL W/O P-5'-P-CCNC: 69 U/L (ref 10–44)
ALT SERPL W/O P-5'-P-CCNC: 7 U/L (ref 10–44)
ALT SERPL W/O P-5'-P-CCNC: 7 U/L (ref 10–44)
ALT SERPL W/O P-5'-P-CCNC: 70 U/L (ref 10–44)
ALT SERPL W/O P-5'-P-CCNC: 8 U/L (ref 10–44)
ANION GAP SERPL CALC-SCNC: 10 MMOL/L (ref 8–16)
ANION GAP SERPL CALC-SCNC: 11 MMOL/L (ref 8–16)
ANION GAP SERPL CALC-SCNC: 11 MMOL/L (ref 8–16)
ANION GAP SERPL CALC-SCNC: 12 MMOL/L (ref 8–16)
ANION GAP SERPL CALC-SCNC: 13 MMOL/L (ref 8–16)
ANION GAP SERPL CALC-SCNC: 14 MMOL/L (ref 8–16)
ANION GAP SERPL CALC-SCNC: 15 MMOL/L (ref 8–16)
ANION GAP SERPL CALC-SCNC: 16 MMOL/L (ref 8–16)
ANION GAP SERPL CALC-SCNC: 17 MMOL/L (ref 8–16)
ANION GAP SERPL CALC-SCNC: 17 MMOL/L (ref 8–16)
ANION GAP SERPL CALC-SCNC: 18 MMOL/L (ref 8–16)
ANION GAP SERPL CALC-SCNC: 19 MMOL/L (ref 8–16)
ANION GAP SERPL CALC-SCNC: 20 MMOL/L (ref 8–16)
ANION GAP SERPL CALC-SCNC: 21 MMOL/L (ref 8–16)
ANION GAP SERPL CALC-SCNC: 22 MMOL/L (ref 8–16)
ANION GAP SERPL CALC-SCNC: 9 MMOL/L (ref 8–16)
APTT BLDCRRT: 22.3 SEC (ref 21–32)
APTT BLDCRRT: 25.4 SEC (ref 21–32)
APTT BLDCRRT: 25.4 SEC (ref 21–32)
APTT BLDCRRT: 26.2 SEC (ref 21–32)
APTT BLDCRRT: 27.6 SEC (ref 21–32)
APTT BLDCRRT: 27.6 SEC (ref 21–32)
APTT BLDCRRT: 27.8 SEC (ref 21–32)
APTT BLDCRRT: 28.2 SEC (ref 21–32)
APTT BLDCRRT: 28.5 SEC (ref 21–32)
APTT BLDCRRT: 28.7 SEC (ref 21–32)
APTT BLDCRRT: 28.8 SEC (ref 21–32)
APTT BLDCRRT: 29.4 SEC (ref 21–32)
APTT BLDCRRT: 29.7 SEC (ref 21–32)
APTT BLDCRRT: 30.3 SEC (ref 21–32)
APTT BLDCRRT: 31.8 SEC (ref 21–32)
APTT BLDCRRT: 32.2 SEC (ref 21–32)
APTT BLDCRRT: 32.8 SEC (ref 21–32)
APTT BLDCRRT: 33.2 SEC (ref 21–32)
APTT BLDCRRT: 35.2 SEC (ref 21–32)
APTT BLDCRRT: 36.2 SEC (ref 21–32)
APTT BLDCRRT: 37.3 SEC (ref 21–32)
APTT BLDCRRT: 38.4 SEC (ref 21–32)
APTT BLDCRRT: 41.6 SEC (ref 21–32)
APTT BLDCRRT: 44.1 SEC (ref 21–32)
APTT BLDCRRT: 46.4 SEC (ref 21–32)
APTT BLDCRRT: 47.9 SEC (ref 21–32)
ASCENDING AORTA: 2.66 CM
ASCENDING AORTA: 2.86 CM
AST SERPL-CCNC: 11 U/L (ref 10–40)
AST SERPL-CCNC: 11 U/L (ref 10–40)
AST SERPL-CCNC: 14 U/L (ref 10–40)
AST SERPL-CCNC: 14 U/L (ref 10–40)
AST SERPL-CCNC: 24 U/L (ref 10–40)
AST SERPL-CCNC: 29 U/L (ref 10–40)
AST SERPL-CCNC: 30 U/L (ref 10–40)
AST SERPL-CCNC: 30 U/L (ref 10–40)
AST SERPL-CCNC: 33 U/L (ref 10–40)
AST SERPL-CCNC: 36 U/L (ref 10–40)
AST SERPL-CCNC: 38 U/L (ref 10–40)
AST SERPL-CCNC: 51 U/L (ref 10–40)
AST SERPL-CCNC: 55 U/L (ref 10–40)
AST SERPL-CCNC: 70 U/L (ref 10–40)
AV INDEX (PROSTH): 0.5
AV INDEX (PROSTH): 0.58
AV MEAN GRADIENT: 4 MMHG
AV MEAN GRADIENT: 6 MMHG
AV PEAK GRADIENT: 2 MMHG
AV PEAK GRADIENT: 9 MMHG
AV VALVE AREA: 1.62 CM2
AV VALVE AREA: 1.78 CM2
AV VELOCITY RATIO: 0.49
AV VELOCITY RATIO: 1.23
BACTERIA STL CULT: NORMAL
BASOPHILS # BLD AUTO: 0.02 K/UL (ref 0–0.2)
BASOPHILS # BLD AUTO: 0.03 K/UL (ref 0–0.2)
BASOPHILS # BLD AUTO: 0.04 K/UL (ref 0–0.2)
BASOPHILS # BLD AUTO: 0.05 K/UL (ref 0–0.2)
BASOPHILS NFR BLD: 0.3 % (ref 0–1.9)
BASOPHILS NFR BLD: 0.4 % (ref 0–1.9)
BASOPHILS NFR BLD: 0.5 % (ref 0–1.9)
BASOPHILS NFR BLD: 0.6 % (ref 0–1.9)
BASOPHILS NFR BLD: 0.7 % (ref 0–1.9)
BASOPHILS NFR BLD: 0.7 % (ref 0–1.9)
BASOPHILS NFR BLD: 1 % (ref 0–1.9)
BILIRUB SERPL-MCNC: 0.6 MG/DL (ref 0.1–1)
BILIRUB SERPL-MCNC: 0.7 MG/DL (ref 0.1–1)
BILIRUB SERPL-MCNC: 0.8 MG/DL (ref 0.1–1)
BILIRUB SERPL-MCNC: 0.9 MG/DL (ref 0.1–1)
BILIRUB SERPL-MCNC: 0.9 MG/DL (ref 0.1–1)
BILIRUB SERPL-MCNC: 1 MG/DL (ref 0.1–1)
BILIRUB SERPL-MCNC: 1 MG/DL (ref 0.1–1)
BLD GP AB SCN CELLS X3 SERPL QL: NORMAL
BLD GP AB SCN CELLS X3 SERPL QL: NORMAL
BNP SERPL-MCNC: 2004 PG/ML (ref 0–99)
BNP SERPL-MCNC: 4114 PG/ML (ref 0–99)
BNP SERPL-MCNC: 4554 PG/ML (ref 0–99)
BNP SERPL-MCNC: 4691 PG/ML (ref 0–99)
BSA FOR ECHO PROCEDURE: 2.11 M2
BSA FOR ECHO PROCEDURE: 2.15 M2
BUN SERPL-MCNC: 10 MG/DL (ref 8–23)
BUN SERPL-MCNC: 102 MG/DL (ref 6–30)
BUN SERPL-MCNC: 102 MG/DL (ref 8–23)
BUN SERPL-MCNC: 108 MG/DL (ref 6–30)
BUN SERPL-MCNC: 11 MG/DL (ref 8–23)
BUN SERPL-MCNC: 12 MG/DL (ref 8–23)
BUN SERPL-MCNC: 122 MG/DL (ref 6–30)
BUN SERPL-MCNC: 16 MG/DL (ref 8–23)
BUN SERPL-MCNC: 27 MG/DL (ref 8–23)
BUN SERPL-MCNC: 28 MG/DL (ref 8–23)
BUN SERPL-MCNC: 29 MG/DL (ref 8–23)
BUN SERPL-MCNC: 30 MG/DL (ref 8–23)
BUN SERPL-MCNC: 33 MG/DL (ref 8–23)
BUN SERPL-MCNC: 35 MG/DL (ref 8–23)
BUN SERPL-MCNC: 37 MG/DL (ref 6–30)
BUN SERPL-MCNC: 38 MG/DL (ref 8–23)
BUN SERPL-MCNC: 4 MG/DL (ref 8–23)
BUN SERPL-MCNC: 4 MG/DL (ref 8–23)
BUN SERPL-MCNC: 41 MG/DL (ref 8–23)
BUN SERPL-MCNC: 43 MG/DL (ref 8–23)
BUN SERPL-MCNC: 44 MG/DL (ref 8–23)
BUN SERPL-MCNC: 44 MG/DL (ref 8–23)
BUN SERPL-MCNC: 46 MG/DL (ref 8–23)
BUN SERPL-MCNC: 46 MG/DL (ref 8–23)
BUN SERPL-MCNC: 47 MG/DL (ref 8–23)
BUN SERPL-MCNC: 49 MG/DL (ref 8–23)
BUN SERPL-MCNC: 5 MG/DL (ref 8–23)
BUN SERPL-MCNC: 50 MG/DL (ref 8–23)
BUN SERPL-MCNC: 52 MG/DL (ref 8–23)
BUN SERPL-MCNC: 57 MG/DL (ref 8–23)
BUN SERPL-MCNC: 6 MG/DL (ref 8–23)
BUN SERPL-MCNC: 6 MG/DL (ref 8–23)
BUN SERPL-MCNC: 60 MG/DL (ref 8–23)
BUN SERPL-MCNC: 60 MG/DL (ref 8–23)
BUN SERPL-MCNC: 64 MG/DL (ref 8–23)
BUN SERPL-MCNC: 66 MG/DL (ref 8–23)
BUN SERPL-MCNC: 7 MG/DL (ref 8–23)
BUN SERPL-MCNC: 70 MG/DL (ref 8–23)
BUN SERPL-MCNC: 94 MG/DL (ref 8–23)
BUN SERPL-MCNC: <3 MG/DL (ref 8–23)
BUN SERPL-MCNC: <3 MG/DL (ref 8–23)
C DIFF GDH STL QL: NEGATIVE
C DIFF GDH STL QL: NEGATIVE
C DIFF TOX A+B STL QL IA: NEGATIVE
C DIFF TOX A+B STL QL IA: NEGATIVE
CALCIUM SERPL-MCNC: 7.9 MG/DL (ref 8.7–10.5)
CALCIUM SERPL-MCNC: 8 MG/DL (ref 8.7–10.5)
CALCIUM SERPL-MCNC: 8.1 MG/DL (ref 8.7–10.5)
CALCIUM SERPL-MCNC: 8.1 MG/DL (ref 8.7–10.5)
CALCIUM SERPL-MCNC: 8.2 MG/DL (ref 8.7–10.5)
CALCIUM SERPL-MCNC: 8.3 MG/DL (ref 8.7–10.5)
CALCIUM SERPL-MCNC: 8.4 MG/DL (ref 8.7–10.5)
CALCIUM SERPL-MCNC: 8.5 MG/DL (ref 8.7–10.5)
CALCIUM SERPL-MCNC: 8.6 MG/DL (ref 8.7–10.5)
CALCIUM SERPL-MCNC: 8.7 MG/DL (ref 8.7–10.5)
CALCIUM SERPL-MCNC: 8.8 MG/DL (ref 8.7–10.5)
CALCIUM SERPL-MCNC: 8.8 MG/DL (ref 8.7–10.5)
CALCIUM SERPL-MCNC: 8.9 MG/DL (ref 8.7–10.5)
CALCIUM SERPL-MCNC: 9 MG/DL (ref 8.7–10.5)
CALCIUM SERPL-MCNC: 9.1 MG/DL (ref 8.7–10.5)
CALCIUM SERPL-MCNC: 9.2 MG/DL (ref 8.7–10.5)
CALCIUM SERPL-MCNC: 9.3 MG/DL (ref 8.7–10.5)
CALCIUM SERPL-MCNC: 9.4 MG/DL (ref 8.7–10.5)
CALCIUM SERPL-MCNC: 9.5 MG/DL (ref 8.7–10.5)
CHLORIDE SERPL-SCNC: 100 MMOL/L (ref 95–110)
CHLORIDE SERPL-SCNC: 101 MMOL/L (ref 95–110)
CHLORIDE SERPL-SCNC: 102 MMOL/L (ref 95–110)
CHLORIDE SERPL-SCNC: 103 MMOL/L (ref 95–110)
CHLORIDE SERPL-SCNC: 104 MMOL/L (ref 95–110)
CHLORIDE SERPL-SCNC: 104 MMOL/L (ref 95–110)
CHLORIDE SERPL-SCNC: 105 MMOL/L (ref 95–110)
CHLORIDE SERPL-SCNC: 105 MMOL/L (ref 95–110)
CHLORIDE SERPL-SCNC: 92 MMOL/L (ref 95–110)
CHLORIDE SERPL-SCNC: 94 MMOL/L (ref 95–110)
CHLORIDE SERPL-SCNC: 94 MMOL/L (ref 95–110)
CHLORIDE SERPL-SCNC: 95 MMOL/L (ref 95–110)
CHLORIDE SERPL-SCNC: 96 MMOL/L (ref 95–110)
CHLORIDE SERPL-SCNC: 97 MMOL/L (ref 95–110)
CHLORIDE SERPL-SCNC: 98 MMOL/L (ref 95–110)
CHLORIDE SERPL-SCNC: 99 MMOL/L (ref 95–110)
CO2 SERPL-SCNC: 16 MMOL/L (ref 23–29)
CO2 SERPL-SCNC: 17 MMOL/L (ref 23–29)
CO2 SERPL-SCNC: 18 MMOL/L (ref 23–29)
CO2 SERPL-SCNC: 19 MMOL/L (ref 23–29)
CO2 SERPL-SCNC: 20 MMOL/L (ref 23–29)
CO2 SERPL-SCNC: 21 MMOL/L (ref 23–29)
CO2 SERPL-SCNC: 22 MMOL/L (ref 23–29)
CO2 SERPL-SCNC: 23 MMOL/L (ref 23–29)
CO2 SERPL-SCNC: 24 MMOL/L (ref 23–29)
CO2 SERPL-SCNC: 25 MMOL/L (ref 23–29)
CO2 SERPL-SCNC: 26 MMOL/L (ref 23–29)
CO2 SERPL-SCNC: 27 MMOL/L (ref 23–29)
CO2 SERPL-SCNC: 28 MMOL/L (ref 23–29)
CO2 SERPL-SCNC: 28 MMOL/L (ref 23–29)
CO2 SERPL-SCNC: 29 MMOL/L (ref 23–29)
CO2 SERPL-SCNC: 29 MMOL/L (ref 23–29)
CREAT SERPL-MCNC: 0.8 MG/DL (ref 0.5–1.4)
CREAT SERPL-MCNC: 0.9 MG/DL (ref 0.5–1.4)
CREAT SERPL-MCNC: 1 MG/DL (ref 0.5–1.4)
CREAT SERPL-MCNC: 1 MG/DL (ref 0.5–1.4)
CREAT SERPL-MCNC: 1.2 MG/DL (ref 0.5–1.4)
CREAT SERPL-MCNC: 1.2 MG/DL (ref 0.5–1.4)
CREAT SERPL-MCNC: 1.6 MG/DL (ref 0.5–1.4)
CREAT SERPL-MCNC: 1.6 MG/DL (ref 0.5–1.4)
CREAT SERPL-MCNC: 1.7 MG/DL (ref 0.5–1.4)
CREAT SERPL-MCNC: 1.8 MG/DL (ref 0.5–1.4)
CREAT SERPL-MCNC: 1.9 MG/DL (ref 0.5–1.4)
CREAT SERPL-MCNC: 10 MG/DL (ref 0.5–1.4)
CREAT SERPL-MCNC: 11.9 MG/DL (ref 0.5–1.4)
CREAT SERPL-MCNC: 12 MG/DL (ref 0.5–1.4)
CREAT SERPL-MCNC: 13.8 MG/DL (ref 0.5–1.4)
CREAT SERPL-MCNC: 14 MG/DL (ref 0.5–1.4)
CREAT SERPL-MCNC: 15.5 MG/DL (ref 0.5–1.4)
CREAT SERPL-MCNC: 2.1 MG/DL (ref 0.5–1.4)
CREAT SERPL-MCNC: 2.5 MG/DL (ref 0.5–1.4)
CREAT SERPL-MCNC: 3.2 MG/DL (ref 0.5–1.4)
CREAT SERPL-MCNC: 4.3 MG/DL (ref 0.5–1.4)
CREAT SERPL-MCNC: 4.4 MG/DL (ref 0.5–1.4)
CREAT SERPL-MCNC: 5.3 MG/DL (ref 0.5–1.4)
CREAT SERPL-MCNC: 5.5 MG/DL (ref 0.5–1.4)
CREAT SERPL-MCNC: 5.8 MG/DL (ref 0.5–1.4)
CREAT SERPL-MCNC: 5.9 MG/DL (ref 0.5–1.4)
CREAT SERPL-MCNC: 6 MG/DL (ref 0.5–1.4)
CREAT SERPL-MCNC: 6.5 MG/DL (ref 0.5–1.4)
CREAT SERPL-MCNC: 6.6 MG/DL (ref 0.5–1.4)
CREAT SERPL-MCNC: 6.6 MG/DL (ref 0.5–1.4)
CREAT SERPL-MCNC: 6.7 MG/DL (ref 0.5–1.4)
CREAT SERPL-MCNC: 6.8 MG/DL (ref 0.5–1.4)
CREAT SERPL-MCNC: 6.9 MG/DL (ref 0.5–1.4)
CREAT SERPL-MCNC: 7.2 MG/DL (ref 0.5–1.4)
CREAT SERPL-MCNC: 7.2 MG/DL (ref 0.5–1.4)
CREAT SERPL-MCNC: 7.4 MG/DL (ref 0.5–1.4)
CREAT SERPL-MCNC: 7.4 MG/DL (ref 0.5–1.4)
CREAT SERPL-MCNC: 7.7 MG/DL (ref 0.5–1.4)
CREAT SERPL-MCNC: 8 MG/DL (ref 0.5–1.4)
CREAT SERPL-MCNC: 8.4 MG/DL (ref 0.5–1.4)
CREAT SERPL-MCNC: 8.4 MG/DL (ref 0.5–1.4)
CREAT SERPL-MCNC: 8.7 MG/DL (ref 0.5–1.4)
CREAT SERPL-MCNC: 9 MG/DL (ref 0.5–1.4)
CRYPTOSP AG STL QL IA: NEGATIVE
CTP QC/QA: NORMAL
CTP QC/QA: YES
CV ECHO LV RWT: 0.27 CM
CV ECHO LV RWT: 0.35 CM
DELSYS: ABNORMAL
DELSYS: NORMAL
DIFFERENTIAL METHOD: ABNORMAL
DOP CALC AO PEAK VEL: 0.71 M/S
DOP CALC AO PEAK VEL: 1.46 M/S
DOP CALC AO VTI: 32.88 CM
DOP CALC AO VTI: 34.44 CM
DOP CALC LVOT AREA: 3.1 CM2
DOP CALC LVOT AREA: 3.3 CM2
DOP CALC LVOT DIAMETER: 1.98 CM
DOP CALC LVOT DIAMETER: 2.04 CM
DOP CALC LVOT PEAK VEL: 0.71 M/S
DOP CALC LVOT PEAK VEL: 0.87 M/S
DOP CALC LVOT STROKE VOLUME: 55.7 CM3
DOP CALC LVOT STROKE VOLUME: 58.38 CM3
DOP CALCLVOT PEAK VEL VTI: 17.05 CM
DOP CALCLVOT PEAK VEL VTI: 18.97 CM
E COLI SXT1 STL QL IA: NEGATIVE
E COLI SXT2 STL QL IA: NEGATIVE
E WAVE DECELERATION TIME: 124.98 MSEC
E WAVE DECELERATION TIME: 189.32 MSEC
E/A RATIO: 1.27
E/A RATIO: 1.41
E/E' RATIO: 22.5 M/S
E/E' RATIO: 26.75 M/S
ECHO LV POSTERIOR WALL: 0.7 CM (ref 0.6–1.1)
ECHO LV POSTERIOR WALL: 1 CM (ref 0.6–1.1)
EJECTION FRACTION: 28 %
EJECTION FRACTION: 40 %
EOSINOPHIL # BLD AUTO: 0 K/UL (ref 0–0.5)
EOSINOPHIL # BLD AUTO: 0.1 K/UL (ref 0–0.5)
EOSINOPHIL NFR BLD: 0.1 % (ref 0–8)
EOSINOPHIL NFR BLD: 0.2 % (ref 0–8)
EOSINOPHIL NFR BLD: 0.2 % (ref 0–8)
EOSINOPHIL NFR BLD: 0.3 % (ref 0–8)
EOSINOPHIL NFR BLD: 0.3 % (ref 0–8)
EOSINOPHIL NFR BLD: 0.4 % (ref 0–8)
EOSINOPHIL NFR BLD: 0.7 % (ref 0–8)
EOSINOPHIL NFR BLD: 0.8 % (ref 0–8)
EOSINOPHIL NFR BLD: 0.8 % (ref 0–8)
EOSINOPHIL NFR BLD: 1 % (ref 0–8)
EOSINOPHIL NFR BLD: 1 % (ref 0–8)
EOSINOPHIL NFR BLD: 1.1 % (ref 0–8)
EOSINOPHIL NFR BLD: 1.4 % (ref 0–8)
EOSINOPHIL NFR BLD: 1.5 % (ref 0–8)
EOSINOPHIL NFR BLD: 1.6 % (ref 0–8)
EOSINOPHIL NFR BLD: 1.6 % (ref 0–8)
EOSINOPHIL NFR BLD: 1.7 % (ref 0–8)
EOSINOPHIL NFR BLD: 1.8 % (ref 0–8)
ERYTHROCYTE [DISTWIDTH] IN BLOOD BY AUTOMATED COUNT: 18.6 % (ref 11.5–14.5)
ERYTHROCYTE [DISTWIDTH] IN BLOOD BY AUTOMATED COUNT: 18.7 % (ref 11.5–14.5)
ERYTHROCYTE [DISTWIDTH] IN BLOOD BY AUTOMATED COUNT: 18.9 % (ref 11.5–14.5)
ERYTHROCYTE [DISTWIDTH] IN BLOOD BY AUTOMATED COUNT: 18.9 % (ref 11.5–14.5)
ERYTHROCYTE [DISTWIDTH] IN BLOOD BY AUTOMATED COUNT: 19 % (ref 11.5–14.5)
ERYTHROCYTE [DISTWIDTH] IN BLOOD BY AUTOMATED COUNT: 19.2 % (ref 11.5–14.5)
ERYTHROCYTE [DISTWIDTH] IN BLOOD BY AUTOMATED COUNT: 19.2 % (ref 11.5–14.5)
ERYTHROCYTE [DISTWIDTH] IN BLOOD BY AUTOMATED COUNT: 19.3 % (ref 11.5–14.5)
ERYTHROCYTE [DISTWIDTH] IN BLOOD BY AUTOMATED COUNT: 19.4 % (ref 11.5–14.5)
ERYTHROCYTE [DISTWIDTH] IN BLOOD BY AUTOMATED COUNT: 19.5 % (ref 11.5–14.5)
ERYTHROCYTE [DISTWIDTH] IN BLOOD BY AUTOMATED COUNT: 19.5 % (ref 11.5–14.5)
ERYTHROCYTE [DISTWIDTH] IN BLOOD BY AUTOMATED COUNT: 20 % (ref 11.5–14.5)
ERYTHROCYTE [DISTWIDTH] IN BLOOD BY AUTOMATED COUNT: 20.2 % (ref 11.5–14.5)
ERYTHROCYTE [DISTWIDTH] IN BLOOD BY AUTOMATED COUNT: 20.2 % (ref 11.5–14.5)
ERYTHROCYTE [DISTWIDTH] IN BLOOD BY AUTOMATED COUNT: 20.3 % (ref 11.5–14.5)
ERYTHROCYTE [DISTWIDTH] IN BLOOD BY AUTOMATED COUNT: 20.3 % (ref 11.5–14.5)
ERYTHROCYTE [DISTWIDTH] IN BLOOD BY AUTOMATED COUNT: 20.4 % (ref 11.5–14.5)
ERYTHROCYTE [SEDIMENTATION RATE] IN BLOOD BY WESTERGREN METHOD: 16 MM/H
ERYTHROCYTE [SEDIMENTATION RATE] IN BLOOD BY WESTERGREN METHOD: 16 MM/H
ERYTHROCYTE [SEDIMENTATION RATE] IN BLOOD BY WESTERGREN METHOD: 20 MM/H
ERYTHROCYTE [SEDIMENTATION RATE] IN BLOOD BY WESTERGREN METHOD: 25 MM/H
ERYTHROCYTE [SEDIMENTATION RATE] IN BLOOD BY WESTERGREN METHOD: 27 MM/H
EST. GFR  (AFRICAN AMERICAN): 11.2 ML/MIN/1.73 M^2
EST. GFR  (AFRICAN AMERICAN): 11.5 ML/MIN/1.73 M^2
EST. GFR  (AFRICAN AMERICAN): 16.5 ML/MIN/1.73 M^2
EST. GFR  (AFRICAN AMERICAN): 2.8 ML/MIN/1.73 M^2
EST. GFR  (AFRICAN AMERICAN): 22.2 ML/MIN/1.73 M^2
EST. GFR  (AFRICAN AMERICAN): 27.5 ML/MIN/1.73 M^2
EST. GFR  (AFRICAN AMERICAN): 3.4 ML/MIN/1.73 M^2
EST. GFR  (AFRICAN AMERICAN): 31 ML/MIN/1.73 M^2
EST. GFR  (AFRICAN AMERICAN): 33.1 ML/MIN/1.73 M^2
EST. GFR  (AFRICAN AMERICAN): 35.5 ML/MIN/1.73 M^2
EST. GFR  (AFRICAN AMERICAN): 38.2 ML/MIN/1.73 M^2
EST. GFR  (AFRICAN AMERICAN): 38.2 ML/MIN/1.73 M^2
EST. GFR  (AFRICAN AMERICAN): 4.2 ML/MIN/1.73 M^2
EST. GFR  (AFRICAN AMERICAN): 4.7 ML/MIN/1.73 M^2
EST. GFR  (AFRICAN AMERICAN): 4.9 ML/MIN/1.73 M^2
EST. GFR  (AFRICAN AMERICAN): 5.1 ML/MIN/1.73 M^2
EST. GFR  (AFRICAN AMERICAN): 5.1 ML/MIN/1.73 M^2
EST. GFR  (AFRICAN AMERICAN): 5.5 ML/MIN/1.73 M^2
EST. GFR  (AFRICAN AMERICAN): 5.7 ML/MIN/1.73 M^2
EST. GFR  (AFRICAN AMERICAN): 54 ML/MIN/1.73 M^2
EST. GFR  (AFRICAN AMERICAN): 54 ML/MIN/1.73 M^2
EST. GFR  (AFRICAN AMERICAN): 6 ML/MIN/1.73 M^2
EST. GFR  (AFRICAN AMERICAN): 6 ML/MIN/1.73 M^2
EST. GFR  (AFRICAN AMERICAN): 6.2 ML/MIN/1.73 M^2
EST. GFR  (AFRICAN AMERICAN): 6.2 ML/MIN/1.73 M^2
EST. GFR  (AFRICAN AMERICAN): 6.5 ML/MIN/1.73 M^2
EST. GFR  (AFRICAN AMERICAN): 6.6 ML/MIN/1.73 M^2
EST. GFR  (AFRICAN AMERICAN): 6.8 ML/MIN/1.73 M^2
EST. GFR  (AFRICAN AMERICAN): 6.9 ML/MIN/1.73 M^2
EST. GFR  (AFRICAN AMERICAN): 6.9 ML/MIN/1.73 M^2
EST. GFR  (AFRICAN AMERICAN): 7 ML/MIN/1.73 M^2
EST. GFR  (AFRICAN AMERICAN): 7.7 ML/MIN/1.73 M^2
EST. GFR  (AFRICAN AMERICAN): 8 ML/MIN/1.73 M^2
EST. GFR  (AFRICAN AMERICAN): 8.6 ML/MIN/1.73 M^2
EST. GFR  (AFRICAN AMERICAN): 9 ML/MIN/1.73 M^2
EST. GFR  (AFRICAN AMERICAN): >60 ML/MIN/1.73 M^2
EST. GFR  (NON AFRICAN AMERICAN): 10 ML/MIN/1.73 M^2
EST. GFR  (NON AFRICAN AMERICAN): 14.3 ML/MIN/1.73 M^2
EST. GFR  (NON AFRICAN AMERICAN): 19.3 ML/MIN/1.73 M^2
EST. GFR  (NON AFRICAN AMERICAN): 2.4 ML/MIN/1.73 M^2
EST. GFR  (NON AFRICAN AMERICAN): 2.9 ML/MIN/1.73 M^2
EST. GFR  (NON AFRICAN AMERICAN): 23.8 ML/MIN/1.73 M^2
EST. GFR  (NON AFRICAN AMERICAN): 26.9 ML/MIN/1.73 M^2
EST. GFR  (NON AFRICAN AMERICAN): 28.7 ML/MIN/1.73 M^2
EST. GFR  (NON AFRICAN AMERICAN): 3.6 ML/MIN/1.73 M^2
EST. GFR  (NON AFRICAN AMERICAN): 30.8 ML/MIN/1.73 M^2
EST. GFR  (NON AFRICAN AMERICAN): 33.1 ML/MIN/1.73 M^2
EST. GFR  (NON AFRICAN AMERICAN): 33.1 ML/MIN/1.73 M^2
EST. GFR  (NON AFRICAN AMERICAN): 4.1 ML/MIN/1.73 M^2
EST. GFR  (NON AFRICAN AMERICAN): 4.3 ML/MIN/1.73 M^2
EST. GFR  (NON AFRICAN AMERICAN): 4.5 ML/MIN/1.73 M^2
EST. GFR  (NON AFRICAN AMERICAN): 4.5 ML/MIN/1.73 M^2
EST. GFR  (NON AFRICAN AMERICAN): 4.7 ML/MIN/1.73 M^2
EST. GFR  (NON AFRICAN AMERICAN): 46.9 ML/MIN/1.73 M^2
EST. GFR  (NON AFRICAN AMERICAN): 46.9 ML/MIN/1.73 M^2
EST. GFR  (NON AFRICAN AMERICAN): 5 ML/MIN/1.73 M^2
EST. GFR  (NON AFRICAN AMERICAN): 5.2 ML/MIN/1.73 M^2
EST. GFR  (NON AFRICAN AMERICAN): 5.2 ML/MIN/1.73 M^2
EST. GFR  (NON AFRICAN AMERICAN): 5.4 ML/MIN/1.73 M^2
EST. GFR  (NON AFRICAN AMERICAN): 5.4 ML/MIN/1.73 M^2
EST. GFR  (NON AFRICAN AMERICAN): 5.7 ML/MIN/1.73 M^2
EST. GFR  (NON AFRICAN AMERICAN): 5.8 ML/MIN/1.73 M^2
EST. GFR  (NON AFRICAN AMERICAN): 5.9 ML/MIN/1.73 M^2
EST. GFR  (NON AFRICAN AMERICAN): 58.4 ML/MIN/1.73 M^2
EST. GFR  (NON AFRICAN AMERICAN): 58.4 ML/MIN/1.73 M^2
EST. GFR  (NON AFRICAN AMERICAN): 6 ML/MIN/1.73 M^2
EST. GFR  (NON AFRICAN AMERICAN): 6 ML/MIN/1.73 M^2
EST. GFR  (NON AFRICAN AMERICAN): 6.1 ML/MIN/1.73 M^2
EST. GFR  (NON AFRICAN AMERICAN): 6.7 ML/MIN/1.73 M^2
EST. GFR  (NON AFRICAN AMERICAN): 7 ML/MIN/1.73 M^2
EST. GFR  (NON AFRICAN AMERICAN): 7.4 ML/MIN/1.73 M^2
EST. GFR  (NON AFRICAN AMERICAN): 7.8 ML/MIN/1.73 M^2
EST. GFR  (NON AFRICAN AMERICAN): 9.7 ML/MIN/1.73 M^2
EST. GFR  (NON AFRICAN AMERICAN): >60 ML/MIN/1.73 M^2
EST. GFR  (NON AFRICAN AMERICAN): >60 ML/MIN/1.73 M^2
FIO2: 32
FIO2: 50
FIO2: 60
FIO2: 80
FRACTIONAL SHORTENING: 21 % (ref 28–44)
FRACTIONAL SHORTENING: 31 % (ref 28–44)
G LAMBLIA AG STL QL IA: NEGATIVE
GLUCOSE SERPL-MCNC: 102 MG/DL (ref 70–110)
GLUCOSE SERPL-MCNC: 103 MG/DL (ref 70–110)
GLUCOSE SERPL-MCNC: 103 MG/DL (ref 70–110)
GLUCOSE SERPL-MCNC: 104 MG/DL (ref 70–110)
GLUCOSE SERPL-MCNC: 113 MG/DL (ref 70–110)
GLUCOSE SERPL-MCNC: 113 MG/DL (ref 70–110)
GLUCOSE SERPL-MCNC: 115 MG/DL (ref 70–110)
GLUCOSE SERPL-MCNC: 116 MG/DL (ref 70–110)
GLUCOSE SERPL-MCNC: 117 MG/DL (ref 70–110)
GLUCOSE SERPL-MCNC: 119 MG/DL (ref 70–110)
GLUCOSE SERPL-MCNC: 120 MG/DL (ref 70–110)
GLUCOSE SERPL-MCNC: 122 MG/DL (ref 70–110)
GLUCOSE SERPL-MCNC: 124 MG/DL (ref 70–110)
GLUCOSE SERPL-MCNC: 127 MG/DL (ref 70–110)
GLUCOSE SERPL-MCNC: 128 MG/DL (ref 70–110)
GLUCOSE SERPL-MCNC: 128 MG/DL (ref 70–110)
GLUCOSE SERPL-MCNC: 129 MG/DL (ref 70–110)
GLUCOSE SERPL-MCNC: 130 MG/DL (ref 70–110)
GLUCOSE SERPL-MCNC: 130 MG/DL (ref 70–110)
GLUCOSE SERPL-MCNC: 132 MG/DL (ref 70–110)
GLUCOSE SERPL-MCNC: 133 MG/DL (ref 70–110)
GLUCOSE SERPL-MCNC: 134 MG/DL (ref 70–110)
GLUCOSE SERPL-MCNC: 134 MG/DL (ref 70–110)
GLUCOSE SERPL-MCNC: 144 MG/DL (ref 70–110)
GLUCOSE SERPL-MCNC: 159 MG/DL (ref 70–110)
GLUCOSE SERPL-MCNC: 72 MG/DL (ref 70–110)
GLUCOSE SERPL-MCNC: 75 MG/DL (ref 70–110)
GLUCOSE SERPL-MCNC: 77 MG/DL (ref 70–110)
GLUCOSE SERPL-MCNC: 78 MG/DL (ref 70–110)
GLUCOSE SERPL-MCNC: 79 MG/DL (ref 70–110)
GLUCOSE SERPL-MCNC: 83 MG/DL (ref 70–110)
GLUCOSE SERPL-MCNC: 84 MG/DL (ref 70–110)
GLUCOSE SERPL-MCNC: 86 MG/DL (ref 70–110)
GLUCOSE SERPL-MCNC: 89 MG/DL (ref 70–110)
GLUCOSE SERPL-MCNC: 89 MG/DL (ref 70–110)
GLUCOSE SERPL-MCNC: 90 MG/DL (ref 70–110)
GLUCOSE SERPL-MCNC: 93 MG/DL (ref 70–110)
GLUCOSE SERPL-MCNC: 94 MG/DL (ref 70–110)
GLUCOSE SERPL-MCNC: 96 MG/DL (ref 70–110)
GLUCOSE SERPL-MCNC: 98 MG/DL (ref 70–110)
GLUCOSE SERPL-MCNC: 98 MG/DL (ref 70–110)
HCO3 UR-SCNC: 17.4 MMOL/L (ref 24–28)
HCO3 UR-SCNC: 17.9 MMOL/L (ref 24–28)
HCO3 UR-SCNC: 21.2 MMOL/L (ref 24–28)
HCO3 UR-SCNC: 23.3 MMOL/L (ref 24–28)
HCO3 UR-SCNC: 24.3 MMOL/L (ref 24–28)
HCO3 UR-SCNC: 24.9 MMOL/L (ref 24–28)
HCO3 UR-SCNC: 24.9 MMOL/L (ref 24–28)
HCO3 UR-SCNC: 27.6 MMOL/L (ref 24–28)
HCO3 UR-SCNC: 28.4 MMOL/L (ref 24–28)
HCO3 UR-SCNC: 28.9 MMOL/L (ref 24–28)
HCO3 UR-SCNC: 29.7 MMOL/L (ref 24–28)
HCO3 UR-SCNC: 31 MMOL/L (ref 24–28)
HCO3 UR-SCNC: 32.7 MMOL/L (ref 24–28)
HCO3 UR-SCNC: 34 MMOL/L (ref 24–28)
HCT VFR BLD AUTO: 28.6 % (ref 37–48.5)
HCT VFR BLD AUTO: 29.4 % (ref 37–48.5)
HCT VFR BLD AUTO: 29.6 % (ref 37–48.5)
HCT VFR BLD AUTO: 29.7 % (ref 37–48.5)
HCT VFR BLD AUTO: 29.8 % (ref 37–48.5)
HCT VFR BLD AUTO: 31 % (ref 37–48.5)
HCT VFR BLD AUTO: 31.8 % (ref 37–48.5)
HCT VFR BLD AUTO: 31.9 % (ref 37–48.5)
HCT VFR BLD AUTO: 31.9 % (ref 37–48.5)
HCT VFR BLD AUTO: 32.2 % (ref 37–48.5)
HCT VFR BLD AUTO: 32.3 % (ref 37–48.5)
HCT VFR BLD AUTO: 32.8 % (ref 37–48.5)
HCT VFR BLD AUTO: 33.7 % (ref 37–48.5)
HCT VFR BLD AUTO: 33.9 % (ref 37–48.5)
HCT VFR BLD AUTO: 33.9 % (ref 37–48.5)
HCT VFR BLD AUTO: 34.3 % (ref 37–48.5)
HCT VFR BLD AUTO: 35.8 % (ref 37–48.5)
HCT VFR BLD AUTO: 36.2 % (ref 37–48.5)
HCT VFR BLD AUTO: 36.4 % (ref 37–48.5)
HCT VFR BLD AUTO: 38 % (ref 37–48.5)
HCT VFR BLD AUTO: 38.4 % (ref 37–48.5)
HCT VFR BLD AUTO: 39.3 % (ref 37–48.5)
HCT VFR BLD CALC: 29 %PCV (ref 36–54)
HCT VFR BLD CALC: 30 %PCV (ref 36–54)
HCT VFR BLD CALC: 34 %PCV (ref 36–54)
HCT VFR BLD CALC: 35 %PCV (ref 36–54)
HCT VFR BLD CALC: 36 %PCV (ref 36–54)
HCT VFR BLD CALC: 40 %PCV (ref 36–54)
HCV AB SERPL QL IA: NEGATIVE
HGB BLD-MCNC: 10 G/DL (ref 12–16)
HGB BLD-MCNC: 10.2 G/DL (ref 12–16)
HGB BLD-MCNC: 10.2 G/DL (ref 12–16)
HGB BLD-MCNC: 10.4 G/DL (ref 12–16)
HGB BLD-MCNC: 10.4 G/DL (ref 12–16)
HGB BLD-MCNC: 10.8 G/DL (ref 12–16)
HGB BLD-MCNC: 11.1 G/DL (ref 12–16)
HGB BLD-MCNC: 11.5 G/DL (ref 12–16)
HGB BLD-MCNC: 12 G/DL (ref 12–16)
HGB BLD-MCNC: 12 G/DL (ref 12–16)
HGB BLD-MCNC: 8.6 G/DL (ref 12–16)
HGB BLD-MCNC: 8.9 G/DL (ref 12–16)
HGB BLD-MCNC: 9.1 G/DL (ref 12–16)
HGB BLD-MCNC: 9.3 G/DL (ref 12–16)
HGB BLD-MCNC: 9.4 G/DL (ref 12–16)
HGB BLD-MCNC: 9.5 G/DL (ref 12–16)
HGB BLD-MCNC: 9.8 G/DL (ref 12–16)
HGB BLD-MCNC: 9.9 G/DL (ref 12–16)
IMM GRANULOCYTES # BLD AUTO: 0.01 K/UL (ref 0–0.04)
IMM GRANULOCYTES # BLD AUTO: 0.02 K/UL (ref 0–0.04)
IMM GRANULOCYTES # BLD AUTO: 0.03 K/UL (ref 0–0.04)
IMM GRANULOCYTES # BLD AUTO: 0.12 K/UL (ref 0–0.04)
IMM GRANULOCYTES # BLD AUTO: 0.16 K/UL (ref 0–0.04)
IMM GRANULOCYTES NFR BLD AUTO: 0.2 % (ref 0–0.5)
IMM GRANULOCYTES NFR BLD AUTO: 0.3 % (ref 0–0.5)
IMM GRANULOCYTES NFR BLD AUTO: 0.4 % (ref 0–0.5)
IMM GRANULOCYTES NFR BLD AUTO: 0.4 % (ref 0–0.5)
IMM GRANULOCYTES NFR BLD AUTO: 1.4 % (ref 0–0.5)
IMM GRANULOCYTES NFR BLD AUTO: 2.9 % (ref 0–0.5)
INR PPP: 1.1 (ref 0.8–1.2)
INTERVENTRICULAR SEPTUM: 0.98 CM (ref 0.6–1.1)
INTERVENTRICULAR SEPTUM: 1 CM (ref 0.6–1.1)
IP: 15
IVRT: 74.22 MSEC
LA MAJOR: 6.23 CM
LA MAJOR: 6.76 CM
LA MINOR: 6.43 CM
LA MINOR: 6.64 CM
LA WIDTH: 3.73 CM
LA WIDTH: 4.84 CM
LACTATE SERPL-SCNC: 0.8 MMOL/L (ref 0.5–2.2)
LACTATE SERPL-SCNC: 0.9 MMOL/L (ref 0.5–2.2)
LACTATE SERPL-SCNC: 1.1 MMOL/L (ref 0.5–2.2)
LDH SERPL L TO P-CCNC: 2.05 MMOL/L (ref 0.5–2.2)
LEFT ATRIUM SIZE: 3.9 CM
LEFT ATRIUM SIZE: 5.88 CM
LEFT ATRIUM VOLUME INDEX MOD: 35.6 ML/M2
LEFT ATRIUM VOLUME INDEX MOD: 37 ML/M2
LEFT ATRIUM VOLUME INDEX: 51.3 ML/M2
LEFT ATRIUM VOLUME INDEX: 59 ML/M2
LEFT ATRIUM VOLUME MOD: 73.33 CM3
LEFT ATRIUM VOLUME MOD: 75.03 CM3
LEFT ATRIUM VOLUME: 105.75 CM3
LEFT ATRIUM VOLUME: 119.84 CM3
LEFT INTERNAL DIMENSION IN SYSTOLE: 3.6 CM (ref 2.1–4)
LEFT INTERNAL DIMENSION IN SYSTOLE: 4.57 CM (ref 2.1–4)
LEFT VENTRICLE DIASTOLIC VOLUME INDEX: 40.52 ML/M2
LEFT VENTRICLE DIASTOLIC VOLUME INDEX: 81.03 ML/M2
LEFT VENTRICLE DIASTOLIC VOLUME: 164.49 ML
LEFT VENTRICLE DIASTOLIC VOLUME: 83.47 ML
LEFT VENTRICLE MASS INDEX: 114 G/M2
LEFT VENTRICLE MASS INDEX: 75 G/M2
LEFT VENTRICLE SYSTOLIC VOLUME INDEX: 21 ML/M2
LEFT VENTRICLE SYSTOLIC VOLUME INDEX: 47.3 ML/M2
LEFT VENTRICLE SYSTOLIC VOLUME: 43.31 ML
LEFT VENTRICLE SYSTOLIC VOLUME: 95.95 ML
LEFT VENTRICULAR INTERNAL DIMENSION IN DIASTOLE: 5.2 CM (ref 3.5–6)
LEFT VENTRICULAR INTERNAL DIMENSION IN DIASTOLE: 5.77 CM (ref 3.5–6)
LEFT VENTRICULAR MASS: 154.56 G
LEFT VENTRICULAR MASS: 231.06 G
LIPASE SERPL-CCNC: 50 U/L (ref 4–60)
LV LATERAL E/E' RATIO: 22.5 M/S
LV LATERAL E/E' RATIO: 26.75 M/S
LV SEPTAL E/E' RATIO: 22.5 M/S
LV SEPTAL E/E' RATIO: 26.75 M/S
LYMPHOCYTES # BLD AUTO: 0.5 K/UL (ref 1–4.8)
LYMPHOCYTES # BLD AUTO: 0.6 K/UL (ref 1–4.8)
LYMPHOCYTES # BLD AUTO: 0.7 K/UL (ref 1–4.8)
LYMPHOCYTES # BLD AUTO: 0.7 K/UL (ref 1–4.8)
LYMPHOCYTES # BLD AUTO: 0.8 K/UL (ref 1–4.8)
LYMPHOCYTES # BLD AUTO: 0.8 K/UL (ref 1–4.8)
LYMPHOCYTES # BLD AUTO: 0.9 K/UL (ref 1–4.8)
LYMPHOCYTES # BLD AUTO: 1 K/UL (ref 1–4.8)
LYMPHOCYTES # BLD AUTO: 1.1 K/UL (ref 1–4.8)
LYMPHOCYTES # BLD AUTO: 1.2 K/UL (ref 1–4.8)
LYMPHOCYTES # BLD AUTO: 1.2 K/UL (ref 1–4.8)
LYMPHOCYTES # BLD AUTO: 1.3 K/UL (ref 1–4.8)
LYMPHOCYTES # BLD AUTO: 1.8 K/UL (ref 1–4.8)
LYMPHOCYTES NFR BLD: 11.5 % (ref 18–48)
LYMPHOCYTES NFR BLD: 11.9 % (ref 18–48)
LYMPHOCYTES NFR BLD: 12.7 % (ref 18–48)
LYMPHOCYTES NFR BLD: 14.1 % (ref 18–48)
LYMPHOCYTES NFR BLD: 14.1 % (ref 18–48)
LYMPHOCYTES NFR BLD: 14.7 % (ref 18–48)
LYMPHOCYTES NFR BLD: 15.7 % (ref 18–48)
LYMPHOCYTES NFR BLD: 17.9 % (ref 18–48)
LYMPHOCYTES NFR BLD: 19.6 % (ref 18–48)
LYMPHOCYTES NFR BLD: 20.8 % (ref 18–48)
LYMPHOCYTES NFR BLD: 22 % (ref 18–48)
LYMPHOCYTES NFR BLD: 22.4 % (ref 18–48)
LYMPHOCYTES NFR BLD: 22.9 % (ref 18–48)
LYMPHOCYTES NFR BLD: 23.9 % (ref 18–48)
LYMPHOCYTES NFR BLD: 24 % (ref 18–48)
LYMPHOCYTES NFR BLD: 24.1 % (ref 18–48)
LYMPHOCYTES NFR BLD: 25.3 % (ref 18–48)
LYMPHOCYTES NFR BLD: 25.3 % (ref 18–48)
LYMPHOCYTES NFR BLD: 30.6 % (ref 18–48)
LYMPHOCYTES NFR BLD: 7.6 % (ref 18–48)
LYMPHOCYTES NFR BLD: 7.7 % (ref 18–48)
LYMPHOCYTES NFR BLD: 9.7 % (ref 18–48)
MAGNESIUM SERPL-MCNC: 1.7 MG/DL (ref 1.6–2.6)
MAGNESIUM SERPL-MCNC: 1.8 MG/DL (ref 1.6–2.6)
MAGNESIUM SERPL-MCNC: 1.9 MG/DL (ref 1.6–2.6)
MAGNESIUM SERPL-MCNC: 2 MG/DL (ref 1.6–2.6)
MAGNESIUM SERPL-MCNC: 2.1 MG/DL (ref 1.6–2.6)
MAGNESIUM SERPL-MCNC: 2.2 MG/DL (ref 1.6–2.6)
MAGNESIUM SERPL-MCNC: 2.3 MG/DL (ref 1.6–2.6)
MAGNESIUM SERPL-MCNC: 2.4 MG/DL (ref 1.6–2.6)
MCH RBC QN AUTO: 26.8 PG (ref 27–31)
MCH RBC QN AUTO: 27 PG (ref 27–31)
MCH RBC QN AUTO: 27.2 PG (ref 27–31)
MCH RBC QN AUTO: 27.4 PG (ref 27–31)
MCH RBC QN AUTO: 27.5 PG (ref 27–31)
MCH RBC QN AUTO: 27.6 PG (ref 27–31)
MCH RBC QN AUTO: 27.8 PG (ref 27–31)
MCH RBC QN AUTO: 28 PG (ref 27–31)
MCH RBC QN AUTO: 28.1 PG (ref 27–31)
MCH RBC QN AUTO: 28.3 PG (ref 27–31)
MCH RBC QN AUTO: 28.5 PG (ref 27–31)
MCH RBC QN AUTO: 28.6 PG (ref 27–31)
MCH RBC QN AUTO: 28.7 PG (ref 27–31)
MCHC RBC AUTO-ENTMCNC: 29.1 G/DL (ref 32–36)
MCHC RBC AUTO-ENTMCNC: 29.2 G/DL (ref 32–36)
MCHC RBC AUTO-ENTMCNC: 29.2 G/DL (ref 32–36)
MCHC RBC AUTO-ENTMCNC: 29.4 G/DL (ref 32–36)
MCHC RBC AUTO-ENTMCNC: 29.5 G/DL (ref 32–36)
MCHC RBC AUTO-ENTMCNC: 29.6 G/DL (ref 32–36)
MCHC RBC AUTO-ENTMCNC: 29.7 G/DL (ref 32–36)
MCHC RBC AUTO-ENTMCNC: 29.8 G/DL (ref 32–36)
MCHC RBC AUTO-ENTMCNC: 30.1 G/DL (ref 32–36)
MCHC RBC AUTO-ENTMCNC: 30.2 G/DL (ref 32–36)
MCHC RBC AUTO-ENTMCNC: 30.3 G/DL (ref 32–36)
MCHC RBC AUTO-ENTMCNC: 30.5 G/DL (ref 32–36)
MCHC RBC AUTO-ENTMCNC: 30.5 G/DL (ref 32–36)
MCHC RBC AUTO-ENTMCNC: 30.6 G/DL (ref 32–36)
MCHC RBC AUTO-ENTMCNC: 30.7 G/DL (ref 32–36)
MCHC RBC AUTO-ENTMCNC: 31.2 G/DL (ref 32–36)
MCHC RBC AUTO-ENTMCNC: 31.3 G/DL (ref 32–36)
MCV RBC AUTO: 89 FL (ref 82–98)
MCV RBC AUTO: 90 FL (ref 82–98)
MCV RBC AUTO: 90 FL (ref 82–98)
MCV RBC AUTO: 91 FL (ref 82–98)
MCV RBC AUTO: 92 FL (ref 82–98)
MCV RBC AUTO: 92 FL (ref 82–98)
MCV RBC AUTO: 93 FL (ref 82–98)
MCV RBC AUTO: 94 FL (ref 82–98)
MCV RBC AUTO: 95 FL (ref 82–98)
MCV RBC AUTO: 95 FL (ref 82–98)
MCV RBC AUTO: 96 FL (ref 82–98)
MCV RBC AUTO: 96 FL (ref 82–98)
MCV RBC AUTO: 97 FL (ref 82–98)
MIN VOL: 9.22
MIN VOL: 9.77
MODE: ABNORMAL
MODE: NORMAL
MONOCYTES # BLD AUTO: 0.2 K/UL (ref 0.3–1)
MONOCYTES # BLD AUTO: 0.3 K/UL (ref 0.3–1)
MONOCYTES # BLD AUTO: 0.4 K/UL (ref 0.3–1)
MONOCYTES # BLD AUTO: 0.5 K/UL (ref 0.3–1)
MONOCYTES # BLD AUTO: 0.6 K/UL (ref 0.3–1)
MONOCYTES # BLD AUTO: 0.6 K/UL (ref 0.3–1)
MONOCYTES NFR BLD: 4.1 % (ref 4–15)
MONOCYTES NFR BLD: 4.5 % (ref 4–15)
MONOCYTES NFR BLD: 5.3 % (ref 4–15)
MONOCYTES NFR BLD: 5.5 % (ref 4–15)
MONOCYTES NFR BLD: 5.9 % (ref 4–15)
MONOCYTES NFR BLD: 6.3 % (ref 4–15)
MONOCYTES NFR BLD: 6.4 % (ref 4–15)
MONOCYTES NFR BLD: 6.5 % (ref 4–15)
MONOCYTES NFR BLD: 6.5 % (ref 4–15)
MONOCYTES NFR BLD: 7.2 % (ref 4–15)
MONOCYTES NFR BLD: 7.2 % (ref 4–15)
MONOCYTES NFR BLD: 7.5 % (ref 4–15)
MONOCYTES NFR BLD: 7.6 % (ref 4–15)
MONOCYTES NFR BLD: 7.7 % (ref 4–15)
MONOCYTES NFR BLD: 7.8 % (ref 4–15)
MONOCYTES NFR BLD: 7.8 % (ref 4–15)
MONOCYTES NFR BLD: 7.9 % (ref 4–15)
MONOCYTES NFR BLD: 8.6 % (ref 4–15)
MONOCYTES NFR BLD: 8.7 % (ref 4–15)
MONOCYTES NFR BLD: 8.8 % (ref 4–15)
MONOCYTES NFR BLD: 8.8 % (ref 4–15)
MONOCYTES NFR BLD: 8.9 % (ref 4–15)
MV A" WAVE DURATION": 10.56 MSEC
MV PEAK A VEL: 0.64 M/S
MV PEAK A VEL: 0.84 M/S
MV PEAK E VEL: 0.9 M/S
MV PEAK E VEL: 1.07 M/S
MV STENOSIS PRESSURE HALF TIME: 36.24 MS
MV STENOSIS PRESSURE HALF TIME: 54.9 MS
MV VALVE AREA P 1/2 METHOD: 4.01 CM2
MV VALVE AREA P 1/2 METHOD: 6.07 CM2
NEUTROPHILS # BLD AUTO: 2.4 K/UL (ref 1.8–7.7)
NEUTROPHILS # BLD AUTO: 2.7 K/UL (ref 1.8–7.7)
NEUTROPHILS # BLD AUTO: 2.7 K/UL (ref 1.8–7.7)
NEUTROPHILS # BLD AUTO: 2.9 K/UL (ref 1.8–7.7)
NEUTROPHILS # BLD AUTO: 3.1 K/UL (ref 1.8–7.7)
NEUTROPHILS # BLD AUTO: 3.4 K/UL (ref 1.8–7.7)
NEUTROPHILS # BLD AUTO: 3.7 K/UL (ref 1.8–7.7)
NEUTROPHILS # BLD AUTO: 3.8 K/UL (ref 1.8–7.7)
NEUTROPHILS # BLD AUTO: 3.9 K/UL (ref 1.8–7.7)
NEUTROPHILS # BLD AUTO: 4.2 K/UL (ref 1.8–7.7)
NEUTROPHILS # BLD AUTO: 4.3 K/UL (ref 1.8–7.7)
NEUTROPHILS # BLD AUTO: 4.4 K/UL (ref 1.8–7.7)
NEUTROPHILS # BLD AUTO: 4.9 K/UL (ref 1.8–7.7)
NEUTROPHILS # BLD AUTO: 5 K/UL (ref 1.8–7.7)
NEUTROPHILS # BLD AUTO: 5.6 K/UL (ref 1.8–7.7)
NEUTROPHILS # BLD AUTO: 6.1 K/UL (ref 1.8–7.7)
NEUTROPHILS # BLD AUTO: 7 K/UL (ref 1.8–7.7)
NEUTROPHILS # BLD AUTO: 7 K/UL (ref 1.8–7.7)
NEUTROPHILS NFR BLD: 58.1 % (ref 38–73)
NEUTROPHILS NFR BLD: 62.9 % (ref 38–73)
NEUTROPHILS NFR BLD: 65.2 % (ref 38–73)
NEUTROPHILS NFR BLD: 65.2 % (ref 38–73)
NEUTROPHILS NFR BLD: 65.3 % (ref 38–73)
NEUTROPHILS NFR BLD: 66.6 % (ref 38–73)
NEUTROPHILS NFR BLD: 68.9 % (ref 38–73)
NEUTROPHILS NFR BLD: 69.2 % (ref 38–73)
NEUTROPHILS NFR BLD: 69.4 % (ref 38–73)
NEUTROPHILS NFR BLD: 69.8 % (ref 38–73)
NEUTROPHILS NFR BLD: 70.5 % (ref 38–73)
NEUTROPHILS NFR BLD: 74 % (ref 38–73)
NEUTROPHILS NFR BLD: 75.4 % (ref 38–73)
NEUTROPHILS NFR BLD: 75.5 % (ref 38–73)
NEUTROPHILS NFR BLD: 76.1 % (ref 38–73)
NEUTROPHILS NFR BLD: 76.9 % (ref 38–73)
NEUTROPHILS NFR BLD: 77.7 % (ref 38–73)
NEUTROPHILS NFR BLD: 78.4 % (ref 38–73)
NEUTROPHILS NFR BLD: 79.6 % (ref 38–73)
NEUTROPHILS NFR BLD: 84.5 % (ref 38–73)
NEUTROPHILS NFR BLD: 85 % (ref 38–73)
NEUTROPHILS NFR BLD: 87.6 % (ref 38–73)
NRBC BLD-RTO: 0 /100 WBC
NRBC BLD-RTO: 1 /100 WBC
O+P STL MICRO: NORMAL
PCO2 BLDA: 33.1 MMHG (ref 35–45)
PCO2 BLDA: 35.7 MMHG (ref 35–45)
PCO2 BLDA: 37.4 MMHG (ref 35–45)
PCO2 BLDA: 42.8 MMHG (ref 35–45)
PCO2 BLDA: 48.4 MMHG (ref 35–45)
PCO2 BLDA: 49.1 MMHG (ref 35–45)
PCO2 BLDA: 50.6 MMHG (ref 35–45)
PCO2 BLDA: 51.2 MMHG (ref 35–45)
PCO2 BLDA: 52.3 MMHG (ref 35–45)
PCO2 BLDA: 53.8 MMHG (ref 35–45)
PCO2 BLDA: 55.6 MMHG (ref 35–45)
PCO2 BLDA: 55.8 MMHG (ref 35–45)
PCO2 BLDA: 63.2 MMHG (ref 35–45)
PCO2 BLDA: 63.4 MMHG (ref 35–45)
PEEP: 10
PEEP: 5
PEEP: 5
PEEP: 8
PH SMN: 7.27 [PH] (ref 7.35–7.45)
PH SMN: 7.29 [PH] (ref 7.35–7.45)
PH SMN: 7.29 [PH] (ref 7.35–7.45)
PH SMN: 7.3 [PH] (ref 7.35–7.45)
PH SMN: 7.33 [PH] (ref 7.35–7.45)
PH SMN: 7.34 [PH] (ref 7.35–7.45)
PH SMN: 7.34 [PH] (ref 7.35–7.45)
PH SMN: 7.36 [PH] (ref 7.35–7.45)
PH SMN: 7.37 [PH] (ref 7.35–7.45)
PH SMN: 7.38 [PH] (ref 7.35–7.45)
PHOSPHATE SERPL-MCNC: 1.9 MG/DL (ref 2.7–4.5)
PHOSPHATE SERPL-MCNC: 2 MG/DL (ref 2.7–4.5)
PHOSPHATE SERPL-MCNC: 2.1 MG/DL (ref 2.7–4.5)
PHOSPHATE SERPL-MCNC: 2.2 MG/DL (ref 2.7–4.5)
PHOSPHATE SERPL-MCNC: 2.5 MG/DL (ref 2.7–4.5)
PHOSPHATE SERPL-MCNC: 2.9 MG/DL (ref 2.7–4.5)
PHOSPHATE SERPL-MCNC: 3.1 MG/DL (ref 2.7–4.5)
PHOSPHATE SERPL-MCNC: 3.9 MG/DL (ref 2.7–4.5)
PHOSPHATE SERPL-MCNC: 3.9 MG/DL (ref 2.7–4.5)
PHOSPHATE SERPL-MCNC: 4.2 MG/DL (ref 2.7–4.5)
PHOSPHATE SERPL-MCNC: 5.2 MG/DL (ref 2.7–4.5)
PHOSPHATE SERPL-MCNC: 5.3 MG/DL (ref 2.7–4.5)
PHOSPHATE SERPL-MCNC: 5.4 MG/DL (ref 2.7–4.5)
PHOSPHATE SERPL-MCNC: 5.6 MG/DL (ref 2.7–4.5)
PHOSPHATE SERPL-MCNC: 5.6 MG/DL (ref 2.7–4.5)
PHOSPHATE SERPL-MCNC: 5.8 MG/DL (ref 2.7–4.5)
PHOSPHATE SERPL-MCNC: 5.9 MG/DL (ref 2.7–4.5)
PHOSPHATE SERPL-MCNC: 6 MG/DL (ref 2.7–4.5)
PHOSPHATE SERPL-MCNC: 6.4 MG/DL (ref 2.7–4.5)
PHOSPHATE SERPL-MCNC: 6.5 MG/DL (ref 2.7–4.5)
PHOSPHATE SERPL-MCNC: 6.7 MG/DL (ref 2.7–4.5)
PHOSPHATE SERPL-MCNC: 6.7 MG/DL (ref 2.7–4.5)
PHOSPHATE SERPL-MCNC: 6.9 MG/DL (ref 2.7–4.5)
PHOSPHATE SERPL-MCNC: 7.3 MG/DL (ref 2.7–4.5)
PHOSPHATE SERPL-MCNC: 7.6 MG/DL (ref 2.7–4.5)
PHOSPHATE SERPL-MCNC: 7.8 MG/DL (ref 2.7–4.5)
PHOSPHATE SERPL-MCNC: 8.5 MG/DL (ref 2.7–4.5)
PHOSPHATE SERPL-MCNC: 9.1 MG/DL (ref 2.7–4.5)
PIP: 15
PIP: 25
PIP: 31
PISA TR MAX VEL: 1.68 M/S
PLATELET # BLD AUTO: 114 K/UL (ref 150–450)
PLATELET # BLD AUTO: 120 K/UL (ref 150–450)
PLATELET # BLD AUTO: 123 K/UL (ref 150–450)
PLATELET # BLD AUTO: 137 K/UL (ref 150–450)
PLATELET # BLD AUTO: 138 K/UL (ref 150–450)
PLATELET # BLD AUTO: 143 K/UL (ref 150–450)
PLATELET # BLD AUTO: 160 K/UL (ref 150–450)
PLATELET # BLD AUTO: 167 K/UL (ref 150–450)
PLATELET # BLD AUTO: 168 K/UL (ref 150–450)
PLATELET # BLD AUTO: 170 K/UL (ref 150–450)
PLATELET # BLD AUTO: 176 K/UL (ref 150–450)
PLATELET # BLD AUTO: 176 K/UL (ref 150–450)
PLATELET # BLD AUTO: 178 K/UL (ref 150–450)
PLATELET # BLD AUTO: 178 K/UL (ref 150–450)
PLATELET # BLD AUTO: 184 K/UL (ref 150–450)
PLATELET # BLD AUTO: 185 K/UL (ref 150–450)
PLATELET # BLD AUTO: 185 K/UL (ref 150–450)
PLATELET # BLD AUTO: 196 K/UL (ref 150–450)
PLATELET # BLD AUTO: 213 K/UL (ref 150–450)
PLATELET # BLD AUTO: 213 K/UL (ref 150–450)
PLATELET # BLD AUTO: 218 K/UL (ref 150–450)
PLATELET # BLD AUTO: 250 K/UL (ref 150–450)
PLATELET RESPONSE PLAVIX: 289 PRU (ref 194–418)
PMV BLD AUTO: 10 FL (ref 9.2–12.9)
PMV BLD AUTO: 10.1 FL (ref 9.2–12.9)
PMV BLD AUTO: 10.4 FL (ref 9.2–12.9)
PMV BLD AUTO: 10.4 FL (ref 9.2–12.9)
PMV BLD AUTO: 10.5 FL (ref 9.2–12.9)
PMV BLD AUTO: 10.6 FL (ref 9.2–12.9)
PMV BLD AUTO: 10.7 FL (ref 9.2–12.9)
PMV BLD AUTO: 10.8 FL (ref 9.2–12.9)
PMV BLD AUTO: 10.9 FL (ref 9.2–12.9)
PMV BLD AUTO: 11 FL (ref 9.2–12.9)
PMV BLD AUTO: 11.1 FL (ref 9.2–12.9)
PMV BLD AUTO: 11.2 FL (ref 9.2–12.9)
PMV BLD AUTO: 11.2 FL (ref 9.2–12.9)
PMV BLD AUTO: 11.3 FL (ref 9.2–12.9)
PMV BLD AUTO: 11.3 FL (ref 9.2–12.9)
PMV BLD AUTO: 11.4 FL (ref 9.2–12.9)
PMV BLD AUTO: 11.6 FL (ref 9.2–12.9)
PMV BLD AUTO: 11.6 FL (ref 9.2–12.9)
PMV BLD AUTO: 11.7 FL (ref 9.2–12.9)
PMV BLD AUTO: 11.8 FL (ref 9.2–12.9)
PO2 BLDA: 113 MMHG (ref 80–100)
PO2 BLDA: 118 MMHG (ref 80–100)
PO2 BLDA: 152 MMHG (ref 80–100)
PO2 BLDA: 26 MMHG (ref 40–60)
PO2 BLDA: 30 MMHG (ref 40–60)
PO2 BLDA: 33 MMHG (ref 40–60)
PO2 BLDA: 36 MMHG (ref 40–60)
PO2 BLDA: 39 MMHG (ref 40–60)
PO2 BLDA: 65 MMHG (ref 80–100)
PO2 BLDA: 74 MMHG (ref 80–100)
PO2 BLDA: 80 MMHG (ref 80–100)
PO2 BLDA: 84 MMHG (ref 80–100)
PO2 BLDA: 85 MMHG (ref 80–100)
PO2 BLDA: 93 MMHG (ref 80–100)
POC ACTIVATED CLOTTING TIME K: 130 SEC (ref 74–137)
POC BE: -2 MMOL/L
POC BE: -2 MMOL/L
POC BE: -4 MMOL/L
POC BE: -4 MMOL/L
POC BE: -8 MMOL/L
POC BE: -9 MMOL/L
POC BE: 0 MMOL/L
POC BE: 1 MMOL/L
POC BE: 2 MMOL/L
POC BE: 4 MMOL/L
POC BE: 5 MMOL/L
POC BE: 5 MMOL/L
POC BE: 8 MMOL/L
POC BE: 8 MMOL/L
POC IONIZED CALCIUM: 0.93 MMOL/L (ref 1.06–1.42)
POC IONIZED CALCIUM: 0.93 MMOL/L (ref 1.06–1.42)
POC IONIZED CALCIUM: 0.98 MMOL/L (ref 1.06–1.42)
POC IONIZED CALCIUM: 1 MMOL/L (ref 1.06–1.42)
POC IONIZED CALCIUM: 1.12 MMOL/L (ref 1.06–1.42)
POC IONIZED CALCIUM: 1.22 MMOL/L (ref 1.06–1.42)
POC SATURATED O2: 41 % (ref 95–100)
POC SATURATED O2: 52 % (ref 95–100)
POC SATURATED O2: 60 % (ref 95–100)
POC SATURATED O2: 64 % (ref 95–100)
POC SATURATED O2: 66 % (ref 95–100)
POC SATURATED O2: 89 % (ref 95–100)
POC SATURATED O2: 93 % (ref 95–100)
POC SATURATED O2: 95 % (ref 95–100)
POC SATURATED O2: 96 % (ref 95–100)
POC SATURATED O2: 96 % (ref 95–100)
POC SATURATED O2: 97 % (ref 95–100)
POC SATURATED O2: 98 % (ref 95–100)
POC SATURATED O2: 98 % (ref 95–100)
POC SATURATED O2: 99 % (ref 95–100)
POC TCO2 (MEASURED): 20 MMOL/L (ref 23–29)
POC TCO2 (MEASURED): 24 MMOL/L (ref 23–29)
POC TCO2 (MEASURED): 25 MMOL/L (ref 23–29)
POC TCO2 (MEASURED): 27 MMOL/L (ref 23–29)
POC TCO2: 18 MMOL/L (ref 24–29)
POC TCO2: 19 MMOL/L (ref 23–27)
POC TCO2: 22 MMOL/L (ref 23–27)
POC TCO2: 25 MMOL/L (ref 23–27)
POC TCO2: 26 MMOL/L (ref 23–27)
POC TCO2: 26 MMOL/L (ref 23–27)
POC TCO2: 26 MMOL/L (ref 24–29)
POC TCO2: 29 MMOL/L (ref 24–29)
POC TCO2: 30 MMOL/L (ref 23–27)
POC TCO2: 30 MMOL/L (ref 23–27)
POC TCO2: 31 MMOL/L (ref 23–27)
POC TCO2: 33 MMOL/L (ref 23–27)
POC TCO2: 34 MMOL/L (ref 24–29)
POC TCO2: 36 MMOL/L (ref 24–29)
POCT GLUCOSE: 101 MG/DL (ref 70–110)
POCT GLUCOSE: 101 MG/DL (ref 70–110)
POCT GLUCOSE: 105 MG/DL (ref 70–110)
POCT GLUCOSE: 107 MG/DL (ref 70–110)
POCT GLUCOSE: 107 MG/DL (ref 70–110)
POCT GLUCOSE: 108 MG/DL (ref 70–110)
POCT GLUCOSE: 110 MG/DL (ref 70–110)
POCT GLUCOSE: 111 MG/DL (ref 70–110)
POCT GLUCOSE: 112 MG/DL (ref 70–110)
POCT GLUCOSE: 113 MG/DL (ref 70–110)
POCT GLUCOSE: 113 MG/DL (ref 70–110)
POCT GLUCOSE: 115 MG/DL (ref 70–110)
POCT GLUCOSE: 116 MG/DL (ref 70–110)
POCT GLUCOSE: 117 MG/DL (ref 70–110)
POCT GLUCOSE: 120 MG/DL (ref 70–110)
POCT GLUCOSE: 121 MG/DL (ref 70–110)
POCT GLUCOSE: 122 MG/DL (ref 70–110)
POCT GLUCOSE: 122 MG/DL (ref 70–110)
POCT GLUCOSE: 123 MG/DL (ref 70–110)
POCT GLUCOSE: 123 MG/DL (ref 70–110)
POCT GLUCOSE: 124 MG/DL (ref 70–110)
POCT GLUCOSE: 125 MG/DL (ref 70–110)
POCT GLUCOSE: 125 MG/DL (ref 70–110)
POCT GLUCOSE: 126 MG/DL (ref 70–110)
POCT GLUCOSE: 127 MG/DL (ref 70–110)
POCT GLUCOSE: 128 MG/DL (ref 70–110)
POCT GLUCOSE: 129 MG/DL (ref 70–110)
POCT GLUCOSE: 129 MG/DL (ref 70–110)
POCT GLUCOSE: 131 MG/DL (ref 70–110)
POCT GLUCOSE: 132 MG/DL (ref 70–110)
POCT GLUCOSE: 132 MG/DL (ref 70–110)
POCT GLUCOSE: 133 MG/DL (ref 70–110)
POCT GLUCOSE: 135 MG/DL (ref 70–110)
POCT GLUCOSE: 137 MG/DL (ref 70–110)
POCT GLUCOSE: 138 MG/DL (ref 70–110)
POCT GLUCOSE: 141 MG/DL (ref 70–110)
POCT GLUCOSE: 143 MG/DL (ref 70–110)
POCT GLUCOSE: 143 MG/DL (ref 70–110)
POCT GLUCOSE: 145 MG/DL (ref 70–110)
POCT GLUCOSE: 147 MG/DL (ref 70–110)
POCT GLUCOSE: 148 MG/DL (ref 70–110)
POCT GLUCOSE: 149 MG/DL (ref 70–110)
POCT GLUCOSE: 154 MG/DL (ref 70–110)
POCT GLUCOSE: 157 MG/DL (ref 70–110)
POCT GLUCOSE: 158 MG/DL (ref 70–110)
POCT GLUCOSE: 164 MG/DL (ref 70–110)
POCT GLUCOSE: 165 MG/DL (ref 70–110)
POCT GLUCOSE: 166 MG/DL (ref 70–110)
POCT GLUCOSE: 167 MG/DL (ref 70–110)
POCT GLUCOSE: 171 MG/DL (ref 70–110)
POCT GLUCOSE: 173 MG/DL (ref 70–110)
POCT GLUCOSE: 176 MG/DL (ref 70–110)
POCT GLUCOSE: 178 MG/DL (ref 70–110)
POCT GLUCOSE: 190 MG/DL (ref 70–110)
POCT GLUCOSE: 195 MG/DL (ref 70–110)
POCT GLUCOSE: 198 MG/DL (ref 70–110)
POCT GLUCOSE: 230 MG/DL (ref 70–110)
POCT GLUCOSE: 49 MG/DL (ref 70–110)
POCT GLUCOSE: 59 MG/DL (ref 70–110)
POCT GLUCOSE: 74 MG/DL (ref 70–110)
POCT GLUCOSE: 75 MG/DL (ref 70–110)
POCT GLUCOSE: 78 MG/DL (ref 70–110)
POCT GLUCOSE: 79 MG/DL (ref 70–110)
POCT GLUCOSE: 83 MG/DL (ref 70–110)
POCT GLUCOSE: 84 MG/DL (ref 70–110)
POCT GLUCOSE: 85 MG/DL (ref 70–110)
POCT GLUCOSE: 87 MG/DL (ref 70–110)
POCT GLUCOSE: 87 MG/DL (ref 70–110)
POCT GLUCOSE: 88 MG/DL (ref 70–110)
POCT GLUCOSE: 89 MG/DL (ref 70–110)
POCT GLUCOSE: 90 MG/DL (ref 70–110)
POCT GLUCOSE: 91 MG/DL (ref 70–110)
POCT GLUCOSE: 94 MG/DL (ref 70–110)
POCT GLUCOSE: 95 MG/DL (ref 70–110)
POCT GLUCOSE: 95 MG/DL (ref 70–110)
POCT GLUCOSE: 97 MG/DL (ref 70–110)
POCT GLUCOSE: 97 MG/DL (ref 70–110)
POCT GLUCOSE: 98 MG/DL (ref 70–110)
POCT GLUCOSE: 99 MG/DL (ref 70–110)
POTASSIUM BLD-SCNC: 4.3 MMOL/L (ref 3.5–5.1)
POTASSIUM BLD-SCNC: 5.5 MMOL/L (ref 3.5–5.1)
POTASSIUM BLD-SCNC: 5.5 MMOL/L (ref 3.5–5.1)
POTASSIUM BLD-SCNC: 6.5 MMOL/L (ref 3.5–5.1)
POTASSIUM BLD-SCNC: 6.8 MMOL/L (ref 3.5–5.1)
POTASSIUM BLD-SCNC: 8.2 MMOL/L (ref 3.5–5.1)
POTASSIUM SERPL-SCNC: 4 MMOL/L (ref 3.5–5.1)
POTASSIUM SERPL-SCNC: 4.1 MMOL/L (ref 3.5–5.1)
POTASSIUM SERPL-SCNC: 4.1 MMOL/L (ref 3.5–5.1)
POTASSIUM SERPL-SCNC: 4.3 MMOL/L (ref 3.5–5.1)
POTASSIUM SERPL-SCNC: 4.4 MMOL/L (ref 3.5–5.1)
POTASSIUM SERPL-SCNC: 4.5 MMOL/L (ref 3.5–5.1)
POTASSIUM SERPL-SCNC: 4.5 MMOL/L (ref 3.5–5.1)
POTASSIUM SERPL-SCNC: 4.6 MMOL/L (ref 3.5–5.1)
POTASSIUM SERPL-SCNC: 4.6 MMOL/L (ref 3.5–5.1)
POTASSIUM SERPL-SCNC: 4.7 MMOL/L (ref 3.5–5.1)
POTASSIUM SERPL-SCNC: 4.8 MMOL/L (ref 3.5–5.1)
POTASSIUM SERPL-SCNC: 4.9 MMOL/L (ref 3.5–5.1)
POTASSIUM SERPL-SCNC: 5 MMOL/L (ref 3.5–5.1)
POTASSIUM SERPL-SCNC: 5.1 MMOL/L (ref 3.5–5.1)
POTASSIUM SERPL-SCNC: 5.2 MMOL/L (ref 3.5–5.1)
POTASSIUM SERPL-SCNC: 5.3 MMOL/L (ref 3.5–5.1)
POTASSIUM SERPL-SCNC: 5.4 MMOL/L (ref 3.5–5.1)
POTASSIUM SERPL-SCNC: 5.6 MMOL/L (ref 3.5–5.1)
POTASSIUM SERPL-SCNC: 5.7 MMOL/L (ref 3.5–5.1)
POTASSIUM SERPL-SCNC: 5.7 MMOL/L (ref 3.5–5.1)
POTASSIUM SERPL-SCNC: 5.9 MMOL/L (ref 3.5–5.1)
POTASSIUM SERPL-SCNC: 6 MMOL/L (ref 3.5–5.1)
POTASSIUM SERPL-SCNC: 6.3 MMOL/L (ref 3.5–5.1)
POTASSIUM SERPL-SCNC: 6.5 MMOL/L (ref 3.5–5.1)
POTASSIUM SERPL-SCNC: 6.5 MMOL/L (ref 3.5–5.1)
POTASSIUM SERPL-SCNC: 7 MMOL/L (ref 3.5–5.1)
POTASSIUM SERPL-SCNC: 7.2 MMOL/L (ref 3.5–5.1)
POTASSIUM SERPL-SCNC: 8.4 MMOL/L (ref 3.5–5.1)
PROT SERPL-MCNC: 5.8 G/DL (ref 6–8.4)
PROT SERPL-MCNC: 6 G/DL (ref 6–8.4)
PROT SERPL-MCNC: 6.3 G/DL (ref 6–8.4)
PROT SERPL-MCNC: 6.4 G/DL (ref 6–8.4)
PROT SERPL-MCNC: 6.6 G/DL (ref 6–8.4)
PROT SERPL-MCNC: 6.8 G/DL (ref 6–8.4)
PROT SERPL-MCNC: 7 G/DL (ref 6–8.4)
PROT SERPL-MCNC: 7.1 G/DL (ref 6–8.4)
PROT SERPL-MCNC: 7.2 G/DL (ref 6–8.4)
PROT SERPL-MCNC: 7.2 G/DL (ref 6–8.4)
PROT SERPL-MCNC: 7.3 G/DL (ref 6–8.4)
PROT SERPL-MCNC: 7.5 G/DL (ref 6–8.4)
PROT SERPL-MCNC: 7.7 G/DL (ref 6–8.4)
PROT SERPL-MCNC: 8.1 G/DL (ref 6–8.4)
PROTHROMBIN TIME: 11 SEC (ref 9–12.5)
PROTHROMBIN TIME: 11.4 SEC (ref 9–12.5)
PROTHROMBIN TIME: 11.8 SEC (ref 9–12.5)
PULM VEIN S/D RATIO: 1.42
PV PEAK D VEL: 0.24 M/S
PV PEAK S VEL: 0.34 M/S
RA MAJOR: 4.83 CM
RA MAJOR: 5.2 CM
RA PRESSURE: 15 MMHG
RA PRESSURE: 8 MMHG
RA WIDTH: 4.02 CM
RA WIDTH: 4.22 CM
RBC # BLD AUTO: 3.02 M/UL (ref 4–5.4)
RBC # BLD AUTO: 3.21 M/UL (ref 4–5.4)
RBC # BLD AUTO: 3.22 M/UL (ref 4–5.4)
RBC # BLD AUTO: 3.25 M/UL (ref 4–5.4)
RBC # BLD AUTO: 3.27 M/UL (ref 4–5.4)
RBC # BLD AUTO: 3.29 M/UL (ref 4–5.4)
RBC # BLD AUTO: 3.31 M/UL (ref 4–5.4)
RBC # BLD AUTO: 3.31 M/UL (ref 4–5.4)
RBC # BLD AUTO: 3.41 M/UL (ref 4–5.4)
RBC # BLD AUTO: 3.44 M/UL (ref 4–5.4)
RBC # BLD AUTO: 3.49 M/UL (ref 4–5.4)
RBC # BLD AUTO: 3.49 M/UL (ref 4–5.4)
RBC # BLD AUTO: 3.6 M/UL (ref 4–5.4)
RBC # BLD AUTO: 3.67 M/UL (ref 4–5.4)
RBC # BLD AUTO: 3.69 M/UL (ref 4–5.4)
RBC # BLD AUTO: 3.79 M/UL (ref 4–5.4)
RBC # BLD AUTO: 3.82 M/UL (ref 4–5.4)
RBC # BLD AUTO: 4.03 M/UL (ref 4–5.4)
RBC # BLD AUTO: 4.03 M/UL (ref 4–5.4)
RBC # BLD AUTO: 4.09 M/UL (ref 4–5.4)
RBC # BLD AUTO: 4.2 M/UL (ref 4–5.4)
RBC # BLD AUTO: 4.24 M/UL (ref 4–5.4)
RIGHT VENTRICULAR END-DIASTOLIC DIMENSION: 3.94 CM
RIGHT VENTRICULAR END-DIASTOLIC DIMENSION: 4.24 CM
RV AG STL QL IA.RAPID: NEGATIVE
RV TISSUE DOPPLER FREE WALL SYSTOLIC VELOCITY 1 (APICAL 4 CHAMBER VIEW): 11.08 CM/S
SAMPLE: ABNORMAL
SAMPLE: NORMAL
SAMPLE: NORMAL
SARS-COV-2 AG RESP QL IA.RAPID: NEGATIVE
SARS-COV-2 RDRP RESP QL NAA+PROBE: NEGATIVE
SARS-COV-2 RNA RESP QL NAA+PROBE: NOT DETECTED
SARS-COV-2- CYCLE NUMBER: NORMAL
SINUS: 2.92 CM
SINUS: 3.31 CM
SITE: ABNORMAL
SITE: NORMAL
SODIUM BLD-SCNC: 131 MMOL/L (ref 136–145)
SODIUM BLD-SCNC: 133 MMOL/L (ref 136–145)
SODIUM BLD-SCNC: 135 MMOL/L (ref 136–145)
SODIUM BLD-SCNC: 136 MMOL/L (ref 136–145)
SODIUM BLD-SCNC: 137 MMOL/L (ref 136–145)
SODIUM BLD-SCNC: 138 MMOL/L (ref 136–145)
SODIUM SERPL-SCNC: 133 MMOL/L (ref 136–145)
SODIUM SERPL-SCNC: 134 MMOL/L (ref 136–145)
SODIUM SERPL-SCNC: 135 MMOL/L (ref 136–145)
SODIUM SERPL-SCNC: 136 MMOL/L (ref 136–145)
SODIUM SERPL-SCNC: 137 MMOL/L (ref 136–145)
SODIUM SERPL-SCNC: 138 MMOL/L (ref 136–145)
SODIUM SERPL-SCNC: 139 MMOL/L (ref 136–145)
SODIUM SERPL-SCNC: 139 MMOL/L (ref 136–145)
SODIUM SERPL-SCNC: 140 MMOL/L (ref 136–145)
SODIUM SERPL-SCNC: 140 MMOL/L (ref 136–145)
SP02: 100
SP02: 93
SP02: 94
SP02: 96
SP02: 99
STJ: 2.53 CM
STJ: 3.23 CM
TDI LATERAL: 0.04 M/S
TDI LATERAL: 0.04 M/S
TDI SEPTAL: 0.04 M/S
TDI SEPTAL: 0.04 M/S
TDI: 0.04 M/S
TDI: 0.04 M/S
TR MAX PG: 11 MMHG
TRICUSPID ANNULAR PLANE SYSTOLIC EXCURSION: 1.68 CM
TRICUSPID ANNULAR PLANE SYSTOLIC EXCURSION: 1.84 CM
TRIGL SERPL-MCNC: 143 MG/DL (ref 30–150)
TROPONIN I SERPL DL<=0.01 NG/ML-MCNC: 0.37 NG/ML (ref 0–0.03)
TROPONIN I SERPL DL<=0.01 NG/ML-MCNC: 0.39 NG/ML (ref 0–0.03)
TROPONIN I SERPL DL<=0.01 NG/ML-MCNC: 0.4 NG/ML (ref 0–0.03)
TROPONIN I SERPL DL<=0.01 NG/ML-MCNC: 0.53 NG/ML (ref 0–0.03)
TROPONIN I SERPL DL<=0.01 NG/ML-MCNC: 12.04 NG/ML (ref 0–0.03)
TROPONIN I SERPL DL<=0.01 NG/ML-MCNC: 24.44 NG/ML (ref 0–0.03)
TROPONIN I SERPL DL<=0.01 NG/ML-MCNC: 3.14 NG/ML (ref 0–0.03)
TROPONIN I SERPL DL<=0.01 NG/ML-MCNC: 3.47 NG/ML (ref 0–0.03)
TROPONIN I SERPL DL<=0.01 NG/ML-MCNC: 4.81 NG/ML (ref 0–0.03)
TROPONIN I SERPL DL<=0.01 NG/ML-MCNC: 4.87 NG/ML (ref 0–0.03)
TROPONIN I SERPL DL<=0.01 NG/ML-MCNC: 6.79 NG/ML (ref 0–0.03)
TROPONIN I SERPL DL<=0.01 NG/ML-MCNC: 7.58 NG/ML (ref 0–0.03)
TV REST PULMONARY ARTERY PRESSURE: 19 MMHG
VANCOMYCIN SERPL-MCNC: 13.1 UG/ML
VANCOMYCIN SERPL-MCNC: 26.2 UG/ML
VANCOMYCIN SERPL-MCNC: 6.1 UG/ML
VT: 350
WBC # BLD AUTO: 3.57 K/UL (ref 3.9–12.7)
WBC # BLD AUTO: 3.93 K/UL (ref 3.9–12.7)
WBC # BLD AUTO: 4.16 K/UL (ref 3.9–12.7)
WBC # BLD AUTO: 4.2 K/UL (ref 3.9–12.7)
WBC # BLD AUTO: 4.4 K/UL (ref 3.9–12.7)
WBC # BLD AUTO: 4.41 K/UL (ref 3.9–12.7)
WBC # BLD AUTO: 4.75 K/UL (ref 3.9–12.7)
WBC # BLD AUTO: 4.75 K/UL (ref 3.9–12.7)
WBC # BLD AUTO: 4.91 K/UL (ref 3.9–12.7)
WBC # BLD AUTO: 5.08 K/UL (ref 3.9–12.7)
WBC # BLD AUTO: 5.26 K/UL (ref 3.9–12.7)
WBC # BLD AUTO: 5.41 K/UL (ref 3.9–12.7)
WBC # BLD AUTO: 5.45 K/UL (ref 3.9–12.7)
WBC # BLD AUTO: 5.8 K/UL (ref 3.9–12.7)
WBC # BLD AUTO: 5.88 K/UL (ref 3.9–12.7)
WBC # BLD AUTO: 5.88 K/UL (ref 3.9–12.7)
WBC # BLD AUTO: 6.15 K/UL (ref 3.9–12.7)
WBC # BLD AUTO: 6.31 K/UL (ref 3.9–12.7)
WBC # BLD AUTO: 6.67 K/UL (ref 3.9–12.7)
WBC # BLD AUTO: 8.03 K/UL (ref 3.9–12.7)
WBC # BLD AUTO: 8.04 K/UL (ref 3.9–12.7)
WBC # BLD AUTO: 8.74 K/UL (ref 3.9–12.7)
WBC #/AREA STL HPF: NORMAL /[HPF]

## 2022-01-01 PROCEDURE — 83735 ASSAY OF MAGNESIUM: CPT | Mod: 91 | Performed by: INTERNAL MEDICINE

## 2022-01-01 PROCEDURE — 3074F SYST BP LT 130 MM HG: CPT | Mod: CPTII,S$GLB,, | Performed by: INTERNAL MEDICINE

## 2022-01-01 PROCEDURE — 99226 PR SUBSEQUENT OBSERVATION CARE,LEVEL III: ICD-10-PCS | Mod: ,,, | Performed by: PHYSICIAN ASSISTANT

## 2022-01-01 PROCEDURE — 99900026 HC AIRWAY MAINTENANCE (STAT)

## 2022-01-01 PROCEDURE — 94640 AIRWAY INHALATION TREATMENT: CPT

## 2022-01-01 PROCEDURE — 3066F NEPHROPATHY DOC TX: CPT | Mod: CPTII,S$GLB,, | Performed by: INTERNAL MEDICINE

## 2022-01-01 PROCEDURE — 76937 PR  US GUIDE, VASCULAR ACCESS: ICD-10-PCS | Mod: 26,,, | Performed by: EMERGENCY MEDICINE

## 2022-01-01 PROCEDURE — 25000003 PHARM REV CODE 250: Performed by: STUDENT IN AN ORGANIZED HEALTH CARE EDUCATION/TRAINING PROGRAM

## 2022-01-01 PROCEDURE — 94761 N-INVAS EAR/PLS OXIMETRY MLT: CPT

## 2022-01-01 PROCEDURE — 86900 BLOOD TYPING SEROLOGIC ABO: CPT | Performed by: HOSPITALIST

## 2022-01-01 PROCEDURE — 99291 CRITICAL CARE FIRST HOUR: CPT | Mod: GC,,, | Performed by: INTERNAL MEDICINE

## 2022-01-01 PROCEDURE — 80100008 HC CRRT DAILY MAINTENANCE

## 2022-01-01 PROCEDURE — 85730 THROMBOPLASTIN TIME PARTIAL: CPT | Performed by: STUDENT IN AN ORGANIZED HEALTH CARE EDUCATION/TRAINING PROGRAM

## 2022-01-01 PROCEDURE — 80053 COMPREHEN METABOLIC PANEL: CPT | Performed by: STUDENT IN AN ORGANIZED HEALTH CARE EDUCATION/TRAINING PROGRAM

## 2022-01-01 PROCEDURE — 85025 COMPLETE CBC W/AUTO DIFF WBC: CPT | Performed by: STUDENT IN AN ORGANIZED HEALTH CARE EDUCATION/TRAINING PROGRAM

## 2022-01-01 PROCEDURE — 90945 DIALYSIS ONE EVALUATION: CPT

## 2022-01-01 PROCEDURE — 93010 EKG 12-LEAD: ICD-10-PCS | Mod: ,,, | Performed by: INTERNAL MEDICINE

## 2022-01-01 PROCEDURE — 93005 ELECTROCARDIOGRAM TRACING: CPT

## 2022-01-01 PROCEDURE — 99285 EMERGENCY DEPT VISIT HI MDM: CPT | Mod: GC,CS,, | Performed by: EMERGENCY MEDICINE

## 2022-01-01 PROCEDURE — 84100 ASSAY OF PHOSPHORUS: CPT | Performed by: PHYSICIAN ASSISTANT

## 2022-01-01 PROCEDURE — 96376 TX/PRO/DX INJ SAME DRUG ADON: CPT

## 2022-01-01 PROCEDURE — 99900035 HC TECH TIME PER 15 MIN (STAT)

## 2022-01-01 PROCEDURE — 63600175 PHARM REV CODE 636 W HCPCS: Performed by: PHYSICIAN ASSISTANT

## 2022-01-01 PROCEDURE — 90935 HEMODIALYSIS ONE EVALUATION: CPT | Mod: ,,, | Performed by: NURSE PRACTITIONER

## 2022-01-01 PROCEDURE — 25000003 PHARM REV CODE 250: Performed by: INTERNAL MEDICINE

## 2022-01-01 PROCEDURE — 87040 BLOOD CULTURE FOR BACTERIA: CPT | Performed by: INTERNAL MEDICINE

## 2022-01-01 PROCEDURE — 99220 PR INITIAL OBSERVATION CARE,LEVL III: CPT | Mod: ,,, | Performed by: PHYSICIAN ASSISTANT

## 2022-01-01 PROCEDURE — 80100014 HC HEMODIALYSIS 1:1

## 2022-01-01 PROCEDURE — 97165 OT EVAL LOW COMPLEX 30 MIN: CPT

## 2022-01-01 PROCEDURE — C9399 UNCLASSIFIED DRUGS OR BIOLOG: HCPCS | Performed by: PHYSICIAN ASSISTANT

## 2022-01-01 PROCEDURE — 25500020 PHARM REV CODE 255: Performed by: INTERNAL MEDICINE

## 2022-01-01 PROCEDURE — 99233 PR SUBSEQUENT HOSPITAL CARE,LEVL III: ICD-10-PCS | Mod: ,,, | Performed by: HOSPITALIST

## 2022-01-01 PROCEDURE — G0378 HOSPITAL OBSERVATION PER HR: HCPCS

## 2022-01-01 PROCEDURE — 99291 CRITICAL CARE FIRST HOUR: CPT | Mod: 25

## 2022-01-01 PROCEDURE — 83735 ASSAY OF MAGNESIUM: CPT | Performed by: PHYSICIAN ASSISTANT

## 2022-01-01 PROCEDURE — 99499 NO LOS: ICD-10-PCS | Mod: ,,, | Performed by: INTERNAL MEDICINE

## 2022-01-01 PROCEDURE — 84100 ASSAY OF PHOSPHORUS: CPT | Performed by: STUDENT IN AN ORGANIZED HEALTH CARE EDUCATION/TRAINING PROGRAM

## 2022-01-01 PROCEDURE — 84484 ASSAY OF TROPONIN QUANT: CPT | Performed by: EMERGENCY MEDICINE

## 2022-01-01 PROCEDURE — 25000003 PHARM REV CODE 250: Performed by: NURSE PRACTITIONER

## 2022-01-01 PROCEDURE — 93005 RHYTHM STRIP: ICD-10-PCS | Mod: S$GLB,,, | Performed by: INTERNAL MEDICINE

## 2022-01-01 PROCEDURE — 25000003 PHARM REV CODE 250: Performed by: PHYSICIAN ASSISTANT

## 2022-01-01 PROCEDURE — 99291 PR CRITICAL CARE, E/M 30-74 MINUTES: ICD-10-PCS | Mod: CS,,, | Performed by: EMERGENCY MEDICINE

## 2022-01-01 PROCEDURE — 99220 PR INITIAL OBSERVATION CARE,LEVL III: ICD-10-PCS | Mod: GC,,, | Performed by: STUDENT IN AN ORGANIZED HEALTH CARE EDUCATION/TRAINING PROGRAM

## 2022-01-01 PROCEDURE — 20000000 HC ICU ROOM

## 2022-01-01 PROCEDURE — 99239 HOSP IP/OBS DSCHRG MGMT >30: CPT | Mod: ,,, | Performed by: HOSPITALIST

## 2022-01-01 PROCEDURE — 85025 COMPLETE CBC W/AUTO DIFF WBC: CPT | Mod: 91 | Performed by: STUDENT IN AN ORGANIZED HEALTH CARE EDUCATION/TRAINING PROGRAM

## 2022-01-01 PROCEDURE — 83735 ASSAY OF MAGNESIUM: CPT | Performed by: STUDENT IN AN ORGANIZED HEALTH CARE EDUCATION/TRAINING PROGRAM

## 2022-01-01 PROCEDURE — 27000221 HC OXYGEN, UP TO 24 HOURS

## 2022-01-01 PROCEDURE — 36415 COLL VENOUS BLD VENIPUNCTURE: CPT | Performed by: STUDENT IN AN ORGANIZED HEALTH CARE EDUCATION/TRAINING PROGRAM

## 2022-01-01 PROCEDURE — 94003 VENT MGMT INPAT SUBQ DAY: CPT

## 2022-01-01 PROCEDURE — 85730 THROMBOPLASTIN TIME PARTIAL: CPT | Performed by: INTERNAL MEDICINE

## 2022-01-01 PROCEDURE — 96375 TX/PRO/DX INJ NEW DRUG ADDON: CPT

## 2022-01-01 PROCEDURE — 94002 VENT MGMT INPAT INIT DAY: CPT

## 2022-01-01 PROCEDURE — 84132 ASSAY OF SERUM POTASSIUM: CPT | Mod: 91 | Performed by: STUDENT IN AN ORGANIZED HEALTH CARE EDUCATION/TRAINING PROGRAM

## 2022-01-01 PROCEDURE — 83880 ASSAY OF NATRIURETIC PEPTIDE: CPT | Performed by: PHYSICIAN ASSISTANT

## 2022-01-01 PROCEDURE — 99223 PR INITIAL HOSPITAL CARE,LEVL III: ICD-10-PCS | Mod: GC,,, | Performed by: INTERNAL MEDICINE

## 2022-01-01 PROCEDURE — 63600175 PHARM REV CODE 636 W HCPCS: Performed by: STUDENT IN AN ORGANIZED HEALTH CARE EDUCATION/TRAINING PROGRAM

## 2022-01-01 PROCEDURE — C1751 CATH, INF, PER/CENT/MIDLINE: HCPCS

## 2022-01-01 PROCEDURE — G0257 UNSCHED DIALYSIS ESRD PT HOS: HCPCS

## 2022-01-01 PROCEDURE — 36415 COLL VENOUS BLD VENIPUNCTURE: CPT | Performed by: PHYSICIAN ASSISTANT

## 2022-01-01 PROCEDURE — 63600175 PHARM REV CODE 636 W HCPCS

## 2022-01-01 PROCEDURE — 83605 ASSAY OF LACTIC ACID: CPT | Performed by: INTERNAL MEDICINE

## 2022-01-01 PROCEDURE — 87329 GIARDIA AG IA: CPT | Performed by: NURSE PRACTITIONER

## 2022-01-01 PROCEDURE — 63600175 PHARM REV CODE 636 W HCPCS: Performed by: INTERNAL MEDICINE

## 2022-01-01 PROCEDURE — 80048 BASIC METABOLIC PNL TOTAL CA: CPT | Performed by: PHYSICIAN ASSISTANT

## 2022-01-01 PROCEDURE — 84484 ASSAY OF TROPONIN QUANT: CPT | Mod: 91 | Performed by: PHYSICIAN ASSISTANT

## 2022-01-01 PROCEDURE — 84478 ASSAY OF TRIGLYCERIDES: CPT | Performed by: STUDENT IN AN ORGANIZED HEALTH CARE EDUCATION/TRAINING PROGRAM

## 2022-01-01 PROCEDURE — 80053 COMPREHEN METABOLIC PANEL: CPT | Performed by: PHYSICIAN ASSISTANT

## 2022-01-01 PROCEDURE — 99499 UNLISTED E&M SERVICE: CPT | Mod: ,,, | Performed by: INTERNAL MEDICINE

## 2022-01-01 PROCEDURE — 85730 THROMBOPLASTIN TIME PARTIAL: CPT | Mod: 91 | Performed by: HOSPITALIST

## 2022-01-01 PROCEDURE — 99233 SBSQ HOSP IP/OBS HIGH 50: CPT | Mod: ,,, | Performed by: INTERNAL MEDICINE

## 2022-01-01 PROCEDURE — 37799 UNLISTED PX VASCULAR SURGERY: CPT

## 2022-01-01 PROCEDURE — 83735 ASSAY OF MAGNESIUM: CPT | Performed by: INTERNAL MEDICINE

## 2022-01-01 PROCEDURE — 12000002 HC ACUTE/MED SURGE SEMI-PRIVATE ROOM

## 2022-01-01 PROCEDURE — 99226 PR SUBSEQUENT OBSERVATION CARE,LEVEL III: CPT | Mod: ,,, | Performed by: PHYSICIAN ASSISTANT

## 2022-01-01 PROCEDURE — 96374 THER/PROPH/DIAG INJ IV PUSH: CPT | Mod: 59

## 2022-01-01 PROCEDURE — 99291 CRITICAL CARE FIRST HOUR: CPT | Mod: CS,,, | Performed by: EMERGENCY MEDICINE

## 2022-01-01 PROCEDURE — 1125F AMNT PAIN NOTED PAIN PRSNT: CPT | Mod: CPTII,S$GLB,, | Performed by: INTERNAL MEDICINE

## 2022-01-01 PROCEDURE — 99291 PR CRITICAL CARE, E/M 30-74 MINUTES: ICD-10-PCS | Mod: GC,,, | Performed by: INTERNAL MEDICINE

## 2022-01-01 PROCEDURE — 93010 ELECTROCARDIOGRAM REPORT: CPT | Mod: ,,, | Performed by: INTERNAL MEDICINE

## 2022-01-01 PROCEDURE — 87209 SMEAR COMPLEX STAIN: CPT | Performed by: NURSE PRACTITIONER

## 2022-01-01 PROCEDURE — 87449 NOS EACH ORGANISM AG IA: CPT | Performed by: INTERNAL MEDICINE

## 2022-01-01 PROCEDURE — 82803 BLOOD GASES ANY COMBINATION: CPT

## 2022-01-01 PROCEDURE — 25000003 PHARM REV CODE 250: Performed by: EMERGENCY MEDICINE

## 2022-01-01 PROCEDURE — 85730 THROMBOPLASTIN TIME PARTIAL: CPT | Mod: 91 | Performed by: INTERNAL MEDICINE

## 2022-01-01 PROCEDURE — C1769 GUIDE WIRE: HCPCS | Performed by: INTERNAL MEDICINE

## 2022-01-01 PROCEDURE — 99233 PR SUBSEQUENT HOSPITAL CARE,LEVL III: ICD-10-PCS | Mod: ,,, | Performed by: INTERNAL MEDICINE

## 2022-01-01 PROCEDURE — 83605 ASSAY OF LACTIC ACID: CPT | Performed by: EMERGENCY MEDICINE

## 2022-01-01 PROCEDURE — C1760 CLOSURE DEV, VASC: HCPCS | Performed by: INTERNAL MEDICINE

## 2022-01-01 PROCEDURE — 63600175 PHARM REV CODE 636 W HCPCS: Performed by: EMERGENCY MEDICINE

## 2022-01-01 PROCEDURE — 25000003 PHARM REV CODE 250

## 2022-01-01 PROCEDURE — C1894 INTRO/SHEATH, NON-LASER: HCPCS | Performed by: INTERNAL MEDICINE

## 2022-01-01 PROCEDURE — 36415 COLL VENOUS BLD VENIPUNCTURE: CPT | Performed by: HOSPITALIST

## 2022-01-01 PROCEDURE — 96366 THER/PROPH/DIAG IV INF ADDON: CPT

## 2022-01-01 PROCEDURE — 93454 CORONARY ARTERY ANGIO S&I: CPT | Performed by: INTERNAL MEDICINE

## 2022-01-01 PROCEDURE — 92928 PRQ TCAT PLMT NTRAC ST 1 LES: CPT | Mod: LC,,, | Performed by: INTERNAL MEDICINE

## 2022-01-01 PROCEDURE — 99220 PR INITIAL OBSERVATION CARE,LEVL III: ICD-10-PCS | Mod: ,,, | Performed by: PHYSICIAN ASSISTANT

## 2022-01-01 PROCEDURE — 1160F PR REVIEW ALL MEDS BY PRESCRIBER/CLIN PHARMACIST DOCUMENTED: ICD-10-PCS | Mod: CPTII,S$GLB,, | Performed by: INTERNAL MEDICINE

## 2022-01-01 PROCEDURE — 25000242 PHARM REV CODE 250 ALT 637 W/ HCPCS: Performed by: PHYSICIAN ASSISTANT

## 2022-01-01 PROCEDURE — C1874 STENT, COATED/COV W/DEL SYS: HCPCS | Performed by: INTERNAL MEDICINE

## 2022-01-01 PROCEDURE — 25000003 PHARM REV CODE 250: Performed by: HOSPITALIST

## 2022-01-01 PROCEDURE — 85610 PROTHROMBIN TIME: CPT | Performed by: INTERNAL MEDICINE

## 2022-01-01 PROCEDURE — 31500 AD HOC INTUBATION: ICD-10-PCS | Mod: ,,, | Performed by: ANESTHESIOLOGY

## 2022-01-01 PROCEDURE — 82800 BLOOD PH: CPT

## 2022-01-01 PROCEDURE — 84484 ASSAY OF TROPONIN QUANT: CPT | Mod: 91 | Performed by: STUDENT IN AN ORGANIZED HEALTH CARE EDUCATION/TRAINING PROGRAM

## 2022-01-01 PROCEDURE — 76937 US GUIDE VASCULAR ACCESS: CPT | Mod: 26,,, | Performed by: EMERGENCY MEDICINE

## 2022-01-01 PROCEDURE — 1125F PR PAIN SEVERITY QUANTIFIED, PAIN PRESENT: ICD-10-PCS | Mod: CPTII,S$GLB,, | Performed by: INTERNAL MEDICINE

## 2022-01-01 PROCEDURE — 87186 SC STD MICRODIL/AGAR DIL: CPT | Performed by: STUDENT IN AN ORGANIZED HEALTH CARE EDUCATION/TRAINING PROGRAM

## 2022-01-01 PROCEDURE — 99232 SBSQ HOSP IP/OBS MODERATE 35: CPT | Mod: ,,, | Performed by: INTERNAL MEDICINE

## 2022-01-01 PROCEDURE — 25000242 PHARM REV CODE 250 ALT 637 W/ HCPCS: Performed by: STUDENT IN AN ORGANIZED HEALTH CARE EDUCATION/TRAINING PROGRAM

## 2022-01-01 PROCEDURE — 99999 PR PBB SHADOW E&M-EST. PATIENT-LVL V: ICD-10-PCS | Mod: PBBFAC,,, | Performed by: INTERNAL MEDICINE

## 2022-01-01 PROCEDURE — C9600 PERC DRUG-EL COR STENT SING: HCPCS | Mod: 59,LD | Performed by: INTERNAL MEDICINE

## 2022-01-01 PROCEDURE — 93458 PR CATH PLACE/CORON ANGIO, IMG SUPER/INTERP,W LEFT HEART VENTRICULOGRAPHY: ICD-10-PCS | Mod: 26,,, | Performed by: INTERNAL MEDICINE

## 2022-01-01 PROCEDURE — 93010 ELECTROCARDIOGRAM REPORT: CPT | Mod: 76,,, | Performed by: INTERNAL MEDICINE

## 2022-01-01 PROCEDURE — 99226 PR SUBSEQUENT OBSERVATION CARE,LEVEL III: ICD-10-PCS | Mod: GC,,, | Performed by: STUDENT IN AN ORGANIZED HEALTH CARE EDUCATION/TRAINING PROGRAM

## 2022-01-01 PROCEDURE — 82962 GLUCOSE BLOOD TEST: CPT

## 2022-01-01 PROCEDURE — 99223 PR INITIAL HOSPITAL CARE,LEVL III: ICD-10-PCS | Mod: AI,,, | Performed by: STUDENT IN AN ORGANIZED HEALTH CARE EDUCATION/TRAINING PROGRAM

## 2022-01-01 PROCEDURE — 90935 HEMODIALYSIS ONE EVALUATION: CPT

## 2022-01-01 PROCEDURE — 31500 INSERT EMERGENCY AIRWAY: CPT

## 2022-01-01 PROCEDURE — 3288F PR FALLS RISK ASSESSMENT DOCUMENTED: ICD-10-PCS | Mod: CPTII,S$GLB,, | Performed by: INTERNAL MEDICINE

## 2022-01-01 PROCEDURE — 27201247 HC HEMODIALYSIS, SET-UP & CANCEL

## 2022-01-01 PROCEDURE — 99233 SBSQ HOSP IP/OBS HIGH 50: CPT | Mod: ,,, | Performed by: HOSPITALIST

## 2022-01-01 PROCEDURE — 99232 PR SUBSEQUENT HOSPITAL CARE,LEVL II: ICD-10-PCS | Mod: ,,, | Performed by: INTERNAL MEDICINE

## 2022-01-01 PROCEDURE — 99285 PR EMERGENCY DEPT VISIT,LEVEL V: ICD-10-PCS | Mod: GC,CS,, | Performed by: EMERGENCY MEDICINE

## 2022-01-01 PROCEDURE — 99239 PR HOSPITAL DISCHARGE DAY,>30 MIN: ICD-10-PCS | Mod: ,,, | Performed by: INTERNAL MEDICINE

## 2022-01-01 PROCEDURE — G0179 MD RECERTIFICATION HHA PT: HCPCS | Mod: ,,, | Performed by: INTERNAL MEDICINE

## 2022-01-01 PROCEDURE — 85347 COAGULATION TIME ACTIVATED: CPT | Performed by: INTERNAL MEDICINE

## 2022-01-01 PROCEDURE — 80100016 HC MAINTENANCE HEMODIALYSIS

## 2022-01-01 PROCEDURE — 3078F PR MOST RECENT DIASTOLIC BLOOD PRESSURE < 80 MM HG: ICD-10-PCS | Mod: CPTII,S$GLB,, | Performed by: INTERNAL MEDICINE

## 2022-01-01 PROCEDURE — 80202 ASSAY OF VANCOMYCIN: CPT | Performed by: INTERNAL MEDICINE

## 2022-01-01 PROCEDURE — 85014 HEMATOCRIT: CPT

## 2022-01-01 PROCEDURE — 99152 MOD SED SAME PHYS/QHP 5/>YRS: CPT | Performed by: INTERNAL MEDICINE

## 2022-01-01 PROCEDURE — 1111F PR DISCHARGE MEDS RECONCILED W/ CURRENT OUTPATIENT MED LIST: ICD-10-PCS | Mod: CPTII,S$GLB,, | Performed by: INTERNAL MEDICINE

## 2022-01-01 PROCEDURE — 99215 PR OFFICE/OUTPT VISIT, EST, LEVL V, 40-54 MIN: ICD-10-PCS | Mod: GC,S$GLB,, | Performed by: STUDENT IN AN ORGANIZED HEALTH CARE EDUCATION/TRAINING PROGRAM

## 2022-01-01 PROCEDURE — G0009 PNEUMOCOCCAL POLYSACCHARIDE VACCINE 23-VALENT =>2YO SQ IM: ICD-10-PCS | Mod: S$GLB,,, | Performed by: INTERNAL MEDICINE

## 2022-01-01 PROCEDURE — 11000001 HC ACUTE MED/SURG PRIVATE ROOM

## 2022-01-01 PROCEDURE — 85025 COMPLETE CBC W/AUTO DIFF WBC: CPT | Performed by: PHYSICIAN ASSISTANT

## 2022-01-01 PROCEDURE — 99223 PR INITIAL HOSPITAL CARE,LEVL III: ICD-10-PCS | Mod: ,,, | Performed by: INTERNAL MEDICINE

## 2022-01-01 PROCEDURE — 99999 PR PBB SHADOW E&M-EST. PATIENT-LVL V: CPT | Mod: PBBFAC,GC,, | Performed by: STUDENT IN AN ORGANIZED HEALTH CARE EDUCATION/TRAINING PROGRAM

## 2022-01-01 PROCEDURE — 89055 LEUKOCYTE ASSESSMENT FECAL: CPT | Performed by: NURSE PRACTITIONER

## 2022-01-01 PROCEDURE — 97162 PT EVAL MOD COMPLEX 30 MIN: CPT

## 2022-01-01 PROCEDURE — 96361 HYDRATE IV INFUSION ADD-ON: CPT

## 2022-01-01 PROCEDURE — 99999 PR PBB SHADOW E&M-EST. PATIENT-LVL V: CPT | Mod: PBBFAC,,, | Performed by: INTERNAL MEDICINE

## 2022-01-01 PROCEDURE — 84484 ASSAY OF TROPONIN QUANT: CPT | Performed by: STUDENT IN AN ORGANIZED HEALTH CARE EDUCATION/TRAINING PROGRAM

## 2022-01-01 PROCEDURE — 36620 INSERTION CATHETER ARTERY: CPT

## 2022-01-01 PROCEDURE — 99213 OFFICE O/P EST LOW 20 MIN: CPT | Mod: S$GLB,,, | Performed by: INTERNAL MEDICINE

## 2022-01-01 PROCEDURE — 99223 1ST HOSP IP/OBS HIGH 75: CPT | Mod: GC,,, | Performed by: INTERNAL MEDICINE

## 2022-01-01 PROCEDURE — 99217 PR OBSERVATION CARE DISCHARGE: ICD-10-PCS | Mod: ,,, | Performed by: PHYSICIAN ASSISTANT

## 2022-01-01 PROCEDURE — 87045 FECES CULTURE AEROBIC BACT: CPT | Performed by: NURSE PRACTITIONER

## 2022-01-01 PROCEDURE — 1111F DSCHRG MED/CURRENT MED MERGE: CPT | Mod: CPTII,S$GLB,, | Performed by: INTERNAL MEDICINE

## 2022-01-01 PROCEDURE — 25000242 PHARM REV CODE 250 ALT 637 W/ HCPCS: Performed by: EMERGENCY MEDICINE

## 2022-01-01 PROCEDURE — 93010 ELECTROCARDIOGRAM REPORT: CPT | Mod: ,,, | Performed by: STUDENT IN AN ORGANIZED HEALTH CARE EDUCATION/TRAINING PROGRAM

## 2022-01-01 PROCEDURE — 96372 THER/PROPH/DIAG INJ SC/IM: CPT | Mod: 59 | Performed by: PHYSICIAN ASSISTANT

## 2022-01-01 PROCEDURE — 99223 1ST HOSP IP/OBS HIGH 75: CPT | Mod: AI,,, | Performed by: STUDENT IN AN ORGANIZED HEALTH CARE EDUCATION/TRAINING PROGRAM

## 2022-01-01 PROCEDURE — 80048 BASIC METABOLIC PNL TOTAL CA: CPT | Performed by: STUDENT IN AN ORGANIZED HEALTH CARE EDUCATION/TRAINING PROGRAM

## 2022-01-01 PROCEDURE — 85025 COMPLETE CBC W/AUTO DIFF WBC: CPT | Performed by: INTERNAL MEDICINE

## 2022-01-01 PROCEDURE — 87425 ROTAVIRUS AG IA: CPT | Performed by: NURSE PRACTITIONER

## 2022-01-01 PROCEDURE — U0002 COVID-19 LAB TEST NON-CDC: HCPCS | Performed by: STUDENT IN AN ORGANIZED HEALTH CARE EDUCATION/TRAINING PROGRAM

## 2022-01-01 PROCEDURE — 80048 BASIC METABOLIC PNL TOTAL CA: CPT | Performed by: HOSPITALIST

## 2022-01-01 PROCEDURE — 84132 ASSAY OF SERUM POTASSIUM: CPT

## 2022-01-01 PROCEDURE — 80069 RENAL FUNCTION PANEL: CPT | Performed by: HOSPITALIST

## 2022-01-01 PROCEDURE — 86803 HEPATITIS C AB TEST: CPT | Performed by: EMERGENCY MEDICINE

## 2022-01-01 PROCEDURE — 84132 ASSAY OF SERUM POTASSIUM: CPT | Performed by: STUDENT IN AN ORGANIZED HEALTH CARE EDUCATION/TRAINING PROGRAM

## 2022-01-01 PROCEDURE — 87077 CULTURE AEROBIC IDENTIFY: CPT | Mod: 59 | Performed by: STUDENT IN AN ORGANIZED HEALTH CARE EDUCATION/TRAINING PROGRAM

## 2022-01-01 PROCEDURE — 83605 ASSAY OF LACTIC ACID: CPT | Performed by: STUDENT IN AN ORGANIZED HEALTH CARE EDUCATION/TRAINING PROGRAM

## 2022-01-01 PROCEDURE — 93010 ELECTROCARDIOGRAM REPORT: CPT | Mod: S$GLB,,, | Performed by: INTERNAL MEDICINE

## 2022-01-01 PROCEDURE — 87427 SHIGA-LIKE TOXIN AG IA: CPT | Mod: 59 | Performed by: NURSE PRACTITIONER

## 2022-01-01 PROCEDURE — 1101F PR PT FALLS ASSESS DOC 0-1 FALLS W/OUT INJ PAST YR: ICD-10-PCS | Mod: CPTII,S$GLB,, | Performed by: INTERNAL MEDICINE

## 2022-01-01 PROCEDURE — 90935 PR HEMODIALYSIS, ONE EVALUATION: ICD-10-PCS | Mod: ,,, | Performed by: INTERNAL MEDICINE

## 2022-01-01 PROCEDURE — 87046 STOOL CULTR AEROBIC BACT EA: CPT | Mod: 59 | Performed by: NURSE PRACTITIONER

## 2022-01-01 PROCEDURE — 85610 PROTHROMBIN TIME: CPT | Performed by: STUDENT IN AN ORGANIZED HEALTH CARE EDUCATION/TRAINING PROGRAM

## 2022-01-01 PROCEDURE — 99226 PR SUBSEQUENT OBSERVATION CARE,LEVEL III: CPT | Mod: GC,,, | Performed by: STUDENT IN AN ORGANIZED HEALTH CARE EDUCATION/TRAINING PROGRAM

## 2022-01-01 PROCEDURE — 3074F PR MOST RECENT SYSTOLIC BLOOD PRESSURE < 130 MM HG: ICD-10-PCS | Mod: CPTII,S$GLB,, | Performed by: INTERNAL MEDICINE

## 2022-01-01 PROCEDURE — 99222 1ST HOSP IP/OBS MODERATE 55: CPT | Mod: ,,, | Performed by: INTERNAL MEDICINE

## 2022-01-01 PROCEDURE — 80069 RENAL FUNCTION PANEL: CPT | Performed by: INTERNAL MEDICINE

## 2022-01-01 PROCEDURE — 80053 COMPREHEN METABOLIC PANEL: CPT | Performed by: EMERGENCY MEDICINE

## 2022-01-01 PROCEDURE — 99217 PR OBSERVATION CARE DISCHARGE: CPT | Mod: ,,, | Performed by: PHYSICIAN ASSISTANT

## 2022-01-01 PROCEDURE — 99232 PR SUBSEQUENT HOSPITAL CARE,LEVL II: ICD-10-PCS | Mod: ,,, | Performed by: HOSPITALIST

## 2022-01-01 PROCEDURE — 80048 BASIC METABOLIC PNL TOTAL CA: CPT | Mod: 91 | Performed by: STUDENT IN AN ORGANIZED HEALTH CARE EDUCATION/TRAINING PROGRAM

## 2022-01-01 PROCEDURE — 87205 SMEAR GRAM STAIN: CPT | Performed by: STUDENT IN AN ORGANIZED HEALTH CARE EDUCATION/TRAINING PROGRAM

## 2022-01-01 PROCEDURE — C9399 UNCLASSIFIED DRUGS OR BIOLOG: HCPCS | Performed by: STUDENT IN AN ORGANIZED HEALTH CARE EDUCATION/TRAINING PROGRAM

## 2022-01-01 PROCEDURE — 83880 ASSAY OF NATRIURETIC PEPTIDE: CPT | Performed by: STUDENT IN AN ORGANIZED HEALTH CARE EDUCATION/TRAINING PROGRAM

## 2022-01-01 PROCEDURE — 93458 L HRT ARTERY/VENTRICLE ANGIO: CPT | Mod: 26,,, | Performed by: INTERNAL MEDICINE

## 2022-01-01 PROCEDURE — 90732 PPSV23 VACC 2 YRS+ SUBQ/IM: CPT | Mod: S$GLB,,, | Performed by: INTERNAL MEDICINE

## 2022-01-01 PROCEDURE — 87449 NOS EACH ORGANISM AG IA: CPT | Performed by: NURSE PRACTITIONER

## 2022-01-01 PROCEDURE — 97530 THERAPEUTIC ACTIVITIES: CPT

## 2022-01-01 PROCEDURE — 93010 EKG 12-LEAD: ICD-10-PCS | Mod: ,,, | Performed by: STUDENT IN AN ORGANIZED HEALTH CARE EDUCATION/TRAINING PROGRAM

## 2022-01-01 PROCEDURE — 96365 THER/PROPH/DIAG IV INF INIT: CPT

## 2022-01-01 PROCEDURE — 99152 MOD SED SAME PHYS/QHP 5/>YRS: CPT | Mod: ,,, | Performed by: INTERNAL MEDICINE

## 2022-01-01 PROCEDURE — 99239 PR HOSPITAL DISCHARGE DAY,>30 MIN: ICD-10-PCS | Mod: ,,, | Performed by: HOSPITALIST

## 2022-01-01 PROCEDURE — 80047 BASIC METABLC PNL IONIZED CA: CPT

## 2022-01-01 PROCEDURE — 27200966 HC CLOSED SUCTION SYSTEM

## 2022-01-01 PROCEDURE — 36410 VNPNXR 3YR/> PHY/QHP DX/THER: CPT | Mod: 59,,, | Performed by: EMERGENCY MEDICINE

## 2022-01-01 PROCEDURE — 83690 ASSAY OF LIPASE: CPT | Performed by: STUDENT IN AN ORGANIZED HEALTH CARE EDUCATION/TRAINING PROGRAM

## 2022-01-01 PROCEDURE — 93454 CORONARY ARTERY ANGIO S&I: CPT | Mod: 26,,, | Performed by: INTERNAL MEDICINE

## 2022-01-01 PROCEDURE — 99213 PR OFFICE/OUTPT VISIT, EST, LEVL III, 20-29 MIN: ICD-10-PCS | Mod: S$GLB,,, | Performed by: INTERNAL MEDICINE

## 2022-01-01 PROCEDURE — 92928 PR STENT: ICD-10-PCS | Mod: LC,,, | Performed by: INTERNAL MEDICINE

## 2022-01-01 PROCEDURE — 84132 ASSAY OF SERUM POTASSIUM: CPT | Mod: 91 | Performed by: HOSPITALIST

## 2022-01-01 PROCEDURE — 25000003 PHARM REV CODE 250: Performed by: GENERAL PRACTICE

## 2022-01-01 PROCEDURE — 20600001 HC STEP DOWN PRIVATE ROOM

## 2022-01-01 PROCEDURE — 4010F ACE/ARB THERAPY RXD/TAKEN: CPT | Mod: CPTII,S$GLB,, | Performed by: INTERNAL MEDICINE

## 2022-01-01 PROCEDURE — 27200188 HC TRANSDUCER, ART ADULT/PEDS

## 2022-01-01 PROCEDURE — 96375 TX/PRO/DX INJ NEW DRUG ADDON: CPT | Mod: 59

## 2022-01-01 PROCEDURE — 99215 OFFICE O/P EST HI 40 MIN: CPT | Mod: GC,S$GLB,, | Performed by: STUDENT IN AN ORGANIZED HEALTH CARE EDUCATION/TRAINING PROGRAM

## 2022-01-01 PROCEDURE — 84295 ASSAY OF SERUM SODIUM: CPT

## 2022-01-01 PROCEDURE — 96367 TX/PROPH/DG ADDL SEQ IV INF: CPT

## 2022-01-01 PROCEDURE — 84100 ASSAY OF PHOSPHORUS: CPT | Mod: 91 | Performed by: INTERNAL MEDICINE

## 2022-01-01 PROCEDURE — 36410 PR VENIPUNC NEED PHYS SKILL,DX OR RX: ICD-10-PCS | Mod: 59,,, | Performed by: EMERGENCY MEDICINE

## 2022-01-01 PROCEDURE — 86850 RBC ANTIBODY SCREEN: CPT | Performed by: STUDENT IN AN ORGANIZED HEALTH CARE EDUCATION/TRAINING PROGRAM

## 2022-01-01 PROCEDURE — 90935 HEMODIALYSIS ONE EVALUATION: CPT | Mod: ,,, | Performed by: INTERNAL MEDICINE

## 2022-01-01 PROCEDURE — 93454 PR CATH PLACE/CORONARY ANGIO, IMG SUPER/INTERP: ICD-10-PCS | Mod: 26,,, | Performed by: INTERNAL MEDICINE

## 2022-01-01 PROCEDURE — 99291 PR CRITICAL CARE, E/M 30-74 MINUTES: ICD-10-PCS | Mod: 25,CS,, | Performed by: EMERGENCY MEDICINE

## 2022-01-01 PROCEDURE — 27000207 HC ISOLATION

## 2022-01-01 PROCEDURE — 3008F PR BODY MASS INDEX (BMI) DOCUMENTED: ICD-10-PCS | Mod: CPTII,S$GLB,, | Performed by: INTERNAL MEDICINE

## 2022-01-01 PROCEDURE — 3008F BODY MASS INDEX DOCD: CPT | Mod: CPTII,S$GLB,, | Performed by: INTERNAL MEDICINE

## 2022-01-01 PROCEDURE — 3288F FALL RISK ASSESSMENT DOCD: CPT | Mod: CPTII,S$GLB,, | Performed by: INTERNAL MEDICINE

## 2022-01-01 PROCEDURE — G0179 PR HOME HEALTH MD RECERTIFICATION: ICD-10-PCS | Mod: ,,, | Performed by: INTERNAL MEDICINE

## 2022-01-01 PROCEDURE — 99222 PR INITIAL HOSPITAL CARE,LEVL II: ICD-10-PCS | Mod: ,,, | Performed by: INTERNAL MEDICINE

## 2022-01-01 PROCEDURE — 82330 ASSAY OF CALCIUM: CPT

## 2022-01-01 PROCEDURE — 93010 RHYTHM STRIP: ICD-10-PCS | Mod: S$GLB,,, | Performed by: INTERNAL MEDICINE

## 2022-01-01 PROCEDURE — 1101F PT FALLS ASSESS-DOCD LE1/YR: CPT | Mod: CPTII,S$GLB,, | Performed by: INTERNAL MEDICINE

## 2022-01-01 PROCEDURE — 99285 EMERGENCY DEPT VISIT HI MDM: CPT | Mod: 25

## 2022-01-01 PROCEDURE — 93010 ELECTROCARDIOGRAM REPORT: CPT | Mod: 76,,, | Performed by: STUDENT IN AN ORGANIZED HEALTH CARE EDUCATION/TRAINING PROGRAM

## 2022-01-01 PROCEDURE — 80069 RENAL FUNCTION PANEL: CPT | Performed by: STUDENT IN AN ORGANIZED HEALTH CARE EDUCATION/TRAINING PROGRAM

## 2022-01-01 PROCEDURE — 25000242 PHARM REV CODE 250 ALT 637 W/ HCPCS: Performed by: NURSE PRACTITIONER

## 2022-01-01 PROCEDURE — 99214 OFFICE O/P EST MOD 30 MIN: CPT | Mod: ,,, | Performed by: INTERNAL MEDICINE

## 2022-01-01 PROCEDURE — 99239 HOSP IP/OBS DSCHRG MGMT >30: CPT | Mod: ,,, | Performed by: INTERNAL MEDICINE

## 2022-01-01 PROCEDURE — 85025 COMPLETE CBC W/AUTO DIFF WBC: CPT | Performed by: EMERGENCY MEDICINE

## 2022-01-01 PROCEDURE — U0003 INFECTIOUS AGENT DETECTION BY NUCLEIC ACID (DNA OR RNA); SEVERE ACUTE RESPIRATORY SYNDROME CORONAVIRUS 2 (SARS-COV-2) (CORONAVIRUS DISEASE [COVID-19]), AMPLIFIED PROBE TECHNIQUE, MAKING USE OF HIGH THROUGHPUT TECHNOLOGIES AS DESCRIBED BY CMS-2020-01-R: HCPCS | Performed by: NURSE PRACTITIONER

## 2022-01-01 PROCEDURE — 31500 INSERT EMERGENCY AIRWAY: CPT | Mod: ,,, | Performed by: ANESTHESIOLOGY

## 2022-01-01 PROCEDURE — 96368 THER/DIAG CONCURRENT INF: CPT

## 2022-01-01 PROCEDURE — 36415 COLL VENOUS BLD VENIPUNCTURE: CPT | Performed by: INTERNAL MEDICINE

## 2022-01-01 PROCEDURE — 85576 BLOOD PLATELET AGGREGATION: CPT | Performed by: INTERNAL MEDICINE

## 2022-01-01 PROCEDURE — C1887 CATHETER, GUIDING: HCPCS | Performed by: INTERNAL MEDICINE

## 2022-01-01 PROCEDURE — 99291 CRITICAL CARE FIRST HOUR: CPT | Mod: 25,CS,, | Performed by: EMERGENCY MEDICINE

## 2022-01-01 PROCEDURE — 87070 CULTURE OTHR SPECIMN AEROBIC: CPT | Performed by: STUDENT IN AN ORGANIZED HEALTH CARE EDUCATION/TRAINING PROGRAM

## 2022-01-01 PROCEDURE — 90732 PNEUMOCOCCAL POLYSACCHARIDE VACCINE 23-VALENT =>2YO SQ IM: ICD-10-PCS | Mod: S$GLB,,, | Performed by: INTERNAL MEDICINE

## 2022-01-01 PROCEDURE — 4010F PR ACE/ARB THEARPY RXD/TAKEN: ICD-10-PCS | Mod: CPTII,S$GLB,, | Performed by: INTERNAL MEDICINE

## 2022-01-01 PROCEDURE — 96365 THER/PROPH/DIAG IV INF INIT: CPT | Mod: 59

## 2022-01-01 PROCEDURE — 99205 OFFICE O/P NEW HI 60 MIN: CPT | Mod: S$GLB,,, | Performed by: INTERNAL MEDICINE

## 2022-01-01 PROCEDURE — 93010 EKG 12-LEAD: ICD-10-PCS | Mod: 76,,, | Performed by: STUDENT IN AN ORGANIZED HEALTH CARE EDUCATION/TRAINING PROGRAM

## 2022-01-01 PROCEDURE — 80069 RENAL FUNCTION PANEL: CPT | Mod: 91 | Performed by: INTERNAL MEDICINE

## 2022-01-01 PROCEDURE — U0002 COVID-19 LAB TEST NON-CDC: HCPCS | Performed by: PHYSICIAN ASSISTANT

## 2022-01-01 PROCEDURE — 99152 PR MOD CONSCIOUS SEDATION, SAME PHYS, 5+ YRS, FIRST 15 MIN: ICD-10-PCS | Mod: ,,, | Performed by: INTERNAL MEDICINE

## 2022-01-01 PROCEDURE — 99232 SBSQ HOSP IP/OBS MODERATE 35: CPT | Mod: ,,, | Performed by: HOSPITALIST

## 2022-01-01 PROCEDURE — 97535 SELF CARE MNGMENT TRAINING: CPT

## 2022-01-01 PROCEDURE — 3066F PR DOCUMENTATION OF TREATMENT FOR NEPHROPATHY: ICD-10-PCS | Mod: CPTII,S$GLB,, | Performed by: INTERNAL MEDICINE

## 2022-01-01 PROCEDURE — 99153 MOD SED SAME PHYS/QHP EA: CPT | Performed by: INTERNAL MEDICINE

## 2022-01-01 PROCEDURE — 96374 THER/PROPH/DIAG INJ IV PUSH: CPT

## 2022-01-01 PROCEDURE — 99220 PR INITIAL OBSERVATION CARE,LEVL III: CPT | Mod: GC,,, | Performed by: STUDENT IN AN ORGANIZED HEALTH CARE EDUCATION/TRAINING PROGRAM

## 2022-01-01 PROCEDURE — 99205 PR OFFICE/OUTPT VISIT, NEW, LEVL V, 60-74 MIN: ICD-10-PCS | Mod: S$GLB,,, | Performed by: INTERNAL MEDICINE

## 2022-01-01 PROCEDURE — 99223 1ST HOSP IP/OBS HIGH 75: CPT | Mod: ,,, | Performed by: INTERNAL MEDICINE

## 2022-01-01 PROCEDURE — 86850 RBC ANTIBODY SCREEN: CPT | Performed by: HOSPITALIST

## 2022-01-01 PROCEDURE — 83880 ASSAY OF NATRIURETIC PEPTIDE: CPT | Performed by: EMERGENCY MEDICINE

## 2022-01-01 PROCEDURE — 99999 PR PBB SHADOW E&M-EST. PATIENT-LVL V: ICD-10-PCS | Mod: PBBFAC,GC,, | Performed by: STUDENT IN AN ORGANIZED HEALTH CARE EDUCATION/TRAINING PROGRAM

## 2022-01-01 PROCEDURE — 1159F PR MEDICATION LIST DOCUMENTED IN MEDICAL RECORD: ICD-10-PCS | Mod: CPTII,S$GLB,, | Performed by: INTERNAL MEDICINE

## 2022-01-01 PROCEDURE — 25000242 PHARM REV CODE 250 ALT 637 W/ HCPCS: Performed by: HOSPITALIST

## 2022-01-01 PROCEDURE — 99291 CRITICAL CARE FIRST HOUR: CPT

## 2022-01-01 PROCEDURE — 99214 PR OFFICE/OUTPT VISIT, EST, LEVL IV, 30-39 MIN: ICD-10-PCS | Mod: ,,, | Performed by: INTERNAL MEDICINE

## 2022-01-01 PROCEDURE — 83605 ASSAY OF LACTIC ACID: CPT

## 2022-01-01 PROCEDURE — 90935 PR HEMODIALYSIS, ONE EVALUATION: ICD-10-PCS | Mod: ,,, | Performed by: NURSE PRACTITIONER

## 2022-01-01 PROCEDURE — 1159F MED LIST DOCD IN RCRD: CPT | Mod: CPTII,S$GLB,, | Performed by: INTERNAL MEDICINE

## 2022-01-01 PROCEDURE — 84484 ASSAY OF TROPONIN QUANT: CPT | Performed by: PHYSICIAN ASSISTANT

## 2022-01-01 PROCEDURE — 83735 ASSAY OF MAGNESIUM: CPT | Mod: 91 | Performed by: STUDENT IN AN ORGANIZED HEALTH CARE EDUCATION/TRAINING PROGRAM

## 2022-01-01 PROCEDURE — 1160F RVW MEDS BY RX/DR IN RCRD: CPT | Mod: CPTII,S$GLB,, | Performed by: INTERNAL MEDICINE

## 2022-01-01 PROCEDURE — 93458 L HRT ARTERY/VENTRICLE ANGIO: CPT | Performed by: INTERNAL MEDICINE

## 2022-01-01 PROCEDURE — 63600175 PHARM REV CODE 636 W HCPCS: Performed by: NURSE PRACTITIONER

## 2022-01-01 PROCEDURE — 87177 OVA AND PARASITES SMEARS: CPT | Performed by: NURSE PRACTITIONER

## 2022-01-01 PROCEDURE — 84132 ASSAY OF SERUM POTASSIUM: CPT | Performed by: HOSPITALIST

## 2022-01-01 PROCEDURE — 3078F DIAST BP <80 MM HG: CPT | Mod: CPTII,S$GLB,, | Performed by: INTERNAL MEDICINE

## 2022-01-01 PROCEDURE — C1725 CATH, TRANSLUMIN NON-LASER: HCPCS | Performed by: INTERNAL MEDICINE

## 2022-01-01 PROCEDURE — 93005 ELECTROCARDIOGRAM TRACING: CPT | Mod: S$GLB,,, | Performed by: INTERNAL MEDICINE

## 2022-01-01 PROCEDURE — G0009 ADMIN PNEUMOCOCCAL VACCINE: HCPCS | Mod: S$GLB,,, | Performed by: INTERNAL MEDICINE

## 2022-01-01 PROCEDURE — U0002 COVID-19 LAB TEST NON-CDC: HCPCS | Performed by: EMERGENCY MEDICINE

## 2022-01-01 DEVICE — STENT RONYX25008UX RESOLUTE ONYX 2.50X08
Type: IMPLANTABLE DEVICE | Site: CORONARY | Status: FUNCTIONAL
Brand: RESOLUTE ONYX™

## 2022-01-01 RX ORDER — ONDANSETRON 8 MG/1
8 TABLET, ORALLY DISINTEGRATING ORAL EVERY 8 HOURS PRN
Status: DISCONTINUED | OUTPATIENT
Start: 2022-01-01 | End: 2022-01-01 | Stop reason: HOSPADM

## 2022-01-01 RX ORDER — CLONIDINE HYDROCHLORIDE 0.1 MG/1
TABLET ORAL
Status: ON HOLD | COMMUNITY
Start: 2021-01-01 | End: 2022-01-01 | Stop reason: HOSPADM

## 2022-01-01 RX ORDER — SODIUM CHLORIDE 9 MG/ML
INJECTION, SOLUTION INTRAVENOUS ONCE
Status: DISCONTINUED | OUTPATIENT
Start: 2022-01-01 | End: 2022-01-01

## 2022-01-01 RX ORDER — NICARDIPINE HYDROCHLORIDE 0.2 MG/ML
0-15 INJECTION INTRAVENOUS CONTINUOUS
Status: DISCONTINUED | OUTPATIENT
Start: 2022-01-01 | End: 2022-01-01

## 2022-01-01 RX ORDER — FLUCONAZOLE 50 MG/1
50 TABLET ORAL DAILY
Status: DISCONTINUED | OUTPATIENT
Start: 2022-01-01 | End: 2022-01-01

## 2022-01-01 RX ORDER — TALC
6 POWDER (GRAM) TOPICAL NIGHTLY PRN
Status: CANCELLED | OUTPATIENT
Start: 2022-01-01

## 2022-01-01 RX ORDER — IBUPROFEN 200 MG
16 TABLET ORAL
Status: DISCONTINUED | OUTPATIENT
Start: 2022-01-01 | End: 2022-01-01 | Stop reason: HOSPADM

## 2022-01-01 RX ORDER — SODIUM CHLORIDE 0.9 % (FLUSH) 0.9 %
10 SYRINGE (ML) INJECTION
Status: CANCELLED | OUTPATIENT
Start: 2022-01-01

## 2022-01-01 RX ORDER — CHLORHEXIDINE GLUCONATE ORAL RINSE 1.2 MG/ML
15 SOLUTION DENTAL 2 TIMES DAILY
Status: DISCONTINUED | OUTPATIENT
Start: 2022-01-01 | End: 2022-04-07 | Stop reason: HOSPADM

## 2022-01-01 RX ORDER — MUPIROCIN 20 MG/G
OINTMENT TOPICAL 2 TIMES DAILY
Status: COMPLETED | OUTPATIENT
Start: 2022-01-01 | End: 2022-01-01

## 2022-01-01 RX ORDER — PHENYLEPHRINE HCL IN 0.9% NACL 20MG/250ML
0-5 PLASTIC BAG, INJECTION (ML) INTRAVENOUS CONTINUOUS
Status: DISCONTINUED | OUTPATIENT
Start: 2022-01-01 | End: 2022-01-01

## 2022-01-01 RX ORDER — FENTANYL CITRATE 50 UG/ML
25 INJECTION, SOLUTION INTRAMUSCULAR; INTRAVENOUS
Status: DISCONTINUED | OUTPATIENT
Start: 2022-01-01 | End: 2022-01-01

## 2022-01-01 RX ORDER — DIPHENHYDRAMINE HCL 50 MG
50 CAPSULE ORAL ONCE
Status: COMPLETED | OUTPATIENT
Start: 2022-01-01 | End: 2022-01-01

## 2022-01-01 RX ORDER — ATORVASTATIN CALCIUM 40 MG/1
40 TABLET, FILM COATED ORAL DAILY
Status: DISCONTINUED | OUTPATIENT
Start: 2022-01-01 | End: 2022-01-01 | Stop reason: HOSPADM

## 2022-01-01 RX ORDER — MAGNESIUM SULFATE 1 G/100ML
1 INJECTION INTRAVENOUS ONCE
Status: COMPLETED | OUTPATIENT
Start: 2022-01-01 | End: 2022-01-01

## 2022-01-01 RX ORDER — HEPARIN SODIUM 1000 [USP'U]/ML
1000 INJECTION, SOLUTION INTRAVENOUS; SUBCUTANEOUS
Status: DISCONTINUED | OUTPATIENT
Start: 2022-01-01 | End: 2022-01-01 | Stop reason: HOSPADM

## 2022-01-01 RX ORDER — LIDOCAINE HYDROCHLORIDE 20 MG/ML
INJECTION, SOLUTION EPIDURAL; INFILTRATION; INTRACAUDAL; PERINEURAL
Status: DISCONTINUED | OUTPATIENT
Start: 2022-01-01 | End: 2022-01-01 | Stop reason: HOSPADM

## 2022-01-01 RX ORDER — FENTANYL CITRATE 50 UG/ML
25 INJECTION, SOLUTION INTRAMUSCULAR; INTRAVENOUS EVERY 5 MIN PRN
Status: CANCELLED | OUTPATIENT
Start: 2022-01-01

## 2022-01-01 RX ORDER — PROMETHAZINE HYDROCHLORIDE 25 MG/1
25 TABLET ORAL EVERY 6 HOURS PRN
Status: DISCONTINUED | OUTPATIENT
Start: 2022-01-01 | End: 2022-01-01 | Stop reason: HOSPADM

## 2022-01-01 RX ORDER — ONDANSETRON 8 MG/1
8 TABLET, ORALLY DISINTEGRATING ORAL EVERY 8 HOURS PRN
Status: DISCONTINUED | OUTPATIENT
Start: 2022-01-01 | End: 2022-04-07 | Stop reason: HOSPADM

## 2022-01-01 RX ORDER — ACETAMINOPHEN 325 MG/1
650 TABLET ORAL EVERY 4 HOURS PRN
Status: DISCONTINUED | OUTPATIENT
Start: 2022-01-01 | End: 2022-01-01 | Stop reason: HOSPADM

## 2022-01-01 RX ORDER — NAPROXEN SODIUM 220 MG/1
81 TABLET, FILM COATED ORAL DAILY
Status: DISCONTINUED | OUTPATIENT
Start: 2022-01-01 | End: 2022-04-07 | Stop reason: HOSPADM

## 2022-01-01 RX ORDER — MAGNESIUM SULFATE HEPTAHYDRATE 40 MG/ML
2 INJECTION, SOLUTION INTRAVENOUS
Status: DISCONTINUED | OUTPATIENT
Start: 2022-01-01 | End: 2022-01-01

## 2022-01-01 RX ORDER — MIDAZOLAM HYDROCHLORIDE 1 MG/ML
INJECTION, SOLUTION INTRAMUSCULAR; INTRAVENOUS
Status: DISCONTINUED | OUTPATIENT
Start: 2022-01-01 | End: 2022-01-01 | Stop reason: HOSPADM

## 2022-01-01 RX ORDER — ALBUTEROL SULFATE 90 UG/1
2 AEROSOL, METERED RESPIRATORY (INHALATION) EVERY 6 HOURS PRN
Status: DISCONTINUED | OUTPATIENT
Start: 2022-01-01 | End: 2022-01-01

## 2022-01-01 RX ORDER — MICONAZOLE NITRATE 2 %
POWDER (GRAM) TOPICAL 2 TIMES DAILY
Status: DISCONTINUED | OUTPATIENT
Start: 2022-01-01 | End: 2022-04-07 | Stop reason: HOSPADM

## 2022-01-01 RX ORDER — IBUPROFEN 200 MG
24 TABLET ORAL
Status: DISCONTINUED | OUTPATIENT
Start: 2022-01-01 | End: 2022-01-01

## 2022-01-01 RX ORDER — HYDROCODONE BITARTRATE AND ACETAMINOPHEN 500; 5 MG/1; MG/1
TABLET ORAL CONTINUOUS
Status: DISCONTINUED | OUTPATIENT
Start: 2022-01-01 | End: 2022-01-01

## 2022-01-01 RX ORDER — NOREPINEPHRINE BITARTRATE/D5W 4MG/250ML
PLASTIC BAG, INJECTION (ML) INTRAVENOUS
Status: DISPENSED
Start: 2022-01-01 | End: 2022-01-01

## 2022-01-01 RX ORDER — EPINEPHRINE 0.1 MG/ML
INJECTION INTRAVENOUS
Status: DISCONTINUED | OUTPATIENT
Start: 2022-01-01 | End: 2022-01-01 | Stop reason: HOSPADM

## 2022-01-01 RX ORDER — ACETAMINOPHEN 325 MG/1
650 TABLET ORAL EVERY 4 HOURS PRN
Status: DISCONTINUED | OUTPATIENT
Start: 2022-01-01 | End: 2022-01-01

## 2022-01-01 RX ORDER — SODIUM,POTASSIUM PHOSPHATES 280-250MG
2 POWDER IN PACKET (EA) ORAL ONCE
Status: COMPLETED | OUTPATIENT
Start: 2022-01-01 | End: 2022-01-01

## 2022-01-01 RX ORDER — FAMOTIDINE 20 MG/1
20 TABLET, FILM COATED ORAL NIGHTLY PRN
Status: DISCONTINUED | OUTPATIENT
Start: 2022-01-01 | End: 2022-01-01 | Stop reason: HOSPADM

## 2022-01-01 RX ORDER — HEPARIN SODIUM 5000 [USP'U]/ML
5000 INJECTION, SOLUTION INTRAVENOUS; SUBCUTANEOUS EVERY 8 HOURS
Status: DISCONTINUED | OUTPATIENT
Start: 2022-01-01 | End: 2022-01-01

## 2022-01-01 RX ORDER — NALOXONE HCL 0.4 MG/ML
0.02 VIAL (ML) INJECTION
Status: DISCONTINUED | OUTPATIENT
Start: 2022-01-01 | End: 2022-01-01 | Stop reason: HOSPADM

## 2022-01-01 RX ORDER — METOPROLOL TARTRATE 25 MG/1
25 TABLET, FILM COATED ORAL 2 TIMES DAILY
Status: DISCONTINUED | OUTPATIENT
Start: 2022-01-01 | End: 2022-01-01

## 2022-01-01 RX ORDER — FENTANYL CITRATE 50 UG/ML
50 INJECTION, SOLUTION INTRAMUSCULAR; INTRAVENOUS
Status: DISCONTINUED | OUTPATIENT
Start: 2022-01-01 | End: 2022-01-01

## 2022-01-01 RX ORDER — FENTANYL CITRATE 50 UG/ML
INJECTION, SOLUTION INTRAMUSCULAR; INTRAVENOUS
Status: DISCONTINUED | OUTPATIENT
Start: 2022-01-01 | End: 2022-01-01 | Stop reason: HOSPADM

## 2022-01-01 RX ORDER — HEPARIN SODIUM,PORCINE/D5W 25000/250
0-40 INTRAVENOUS SOLUTION INTRAVENOUS CONTINUOUS
Status: DISCONTINUED | OUTPATIENT
Start: 2022-01-01 | End: 2022-01-01

## 2022-01-01 RX ORDER — NOREPINEPHRINE BITARTRATE/D5W 8 MG/250ML
0-3 PLASTIC BAG, INJECTION (ML) INTRAVENOUS CONTINUOUS
Status: DISCONTINUED | OUTPATIENT
Start: 2022-01-01 | End: 2022-01-01

## 2022-01-01 RX ORDER — HYDROMORPHONE HYDROCHLORIDE 1 MG/ML
0.2 INJECTION, SOLUTION INTRAMUSCULAR; INTRAVENOUS; SUBCUTANEOUS EVERY 5 MIN PRN
Status: CANCELLED | OUTPATIENT
Start: 2022-01-01

## 2022-01-01 RX ORDER — HYDROCODONE BITARTRATE AND ACETAMINOPHEN 500; 5 MG/1; MG/1
TABLET ORAL CONTINUOUS
Status: ACTIVE | OUTPATIENT
Start: 2022-01-01 | End: 2022-01-01

## 2022-01-01 RX ORDER — NALOXONE HCL 0.4 MG/ML
0.02 VIAL (ML) INJECTION
Status: DISCONTINUED | OUTPATIENT
Start: 2022-01-01 | End: 2022-04-07 | Stop reason: HOSPADM

## 2022-01-01 RX ORDER — LETROZOLE 2.5 MG/1
2.5 TABLET, FILM COATED ORAL DAILY
Status: CANCELLED
Start: 2022-01-01

## 2022-01-01 RX ORDER — FAMOTIDINE 10 MG/ML
20 INJECTION INTRAVENOUS DAILY
Status: DISCONTINUED | OUTPATIENT
Start: 2022-01-01 | End: 2022-04-07 | Stop reason: HOSPADM

## 2022-01-01 RX ORDER — ALBUTEROL SULFATE 2.5 MG/.5ML
10 SOLUTION RESPIRATORY (INHALATION) ONCE
Status: COMPLETED | OUTPATIENT
Start: 2022-01-01 | End: 2022-01-01

## 2022-01-01 RX ORDER — SODIUM CHLORIDE 9 MG/ML
INJECTION, SOLUTION INTRAVENOUS ONCE
Status: DISCONTINUED | OUTPATIENT
Start: 2022-01-01 | End: 2022-01-01 | Stop reason: HOSPADM

## 2022-01-01 RX ORDER — AMIODARONE HYDROCHLORIDE 200 MG/1
200 TABLET ORAL DAILY
Status: DISCONTINUED | OUTPATIENT
Start: 2022-01-01 | End: 2022-01-01

## 2022-01-01 RX ORDER — CITALOPRAM 20 MG/1
20 TABLET, FILM COATED ORAL NIGHTLY
Status: DISCONTINUED | OUTPATIENT
Start: 2022-01-01 | End: 2022-01-01 | Stop reason: HOSPADM

## 2022-01-01 RX ORDER — HEPARIN SODIUM 1000 [USP'U]/ML
INJECTION, SOLUTION INTRAVENOUS; SUBCUTANEOUS
Status: DISCONTINUED | OUTPATIENT
Start: 2022-01-01 | End: 2022-01-01 | Stop reason: HOSPADM

## 2022-01-01 RX ORDER — EPINEPHRINE 0.1 MG/ML
INJECTION INTRAVENOUS CODE/TRAUMA/SEDATION MEDICATION
Status: COMPLETED | OUTPATIENT
Start: 2022-01-01 | End: 2022-01-01

## 2022-01-01 RX ORDER — LOSARTAN POTASSIUM 25 MG/1
25 TABLET ORAL DAILY
Status: DISCONTINUED | OUTPATIENT
Start: 2022-01-01 | End: 2022-01-01

## 2022-01-01 RX ORDER — ASPIRIN 81 MG/1
81 TABLET ORAL DAILY
Status: DISCONTINUED | OUTPATIENT
Start: 2022-01-01 | End: 2022-01-01 | Stop reason: HOSPADM

## 2022-01-01 RX ORDER — IBUPROFEN 200 MG
24 TABLET ORAL
Status: DISCONTINUED | OUTPATIENT
Start: 2022-01-01 | End: 2022-01-01 | Stop reason: HOSPADM

## 2022-01-01 RX ORDER — SODIUM CHLORIDE 9 MG/ML
INJECTION, SOLUTION INTRAVENOUS ONCE
Status: COMPLETED | OUTPATIENT
Start: 2022-01-01 | End: 2022-01-01

## 2022-01-01 RX ORDER — SODIUM CHLORIDE 9 MG/ML
INJECTION, SOLUTION INTRAVENOUS CONTINUOUS
Status: DISCONTINUED | OUTPATIENT
Start: 2022-01-01 | End: 2022-04-07 | Stop reason: HOSPADM

## 2022-01-01 RX ORDER — HYDRALAZINE HYDROCHLORIDE 20 MG/ML
INJECTION INTRAMUSCULAR; INTRAVENOUS
Status: DISCONTINUED | OUTPATIENT
Start: 2022-01-01 | End: 2022-01-01 | Stop reason: HOSPADM

## 2022-01-01 RX ORDER — SODIUM CHLORIDE 0.9 % (FLUSH) 0.9 %
10 SYRINGE (ML) INJECTION
Status: DISCONTINUED | OUTPATIENT
Start: 2022-01-01 | End: 2022-04-07 | Stop reason: HOSPADM

## 2022-01-01 RX ORDER — PROPOFOL 10 MG/ML
INJECTION, EMULSION INTRAVENOUS
Status: DISPENSED
Start: 2022-01-01 | End: 2022-01-01

## 2022-01-01 RX ORDER — CLOPIDOGREL BISULFATE 75 MG/1
75 TABLET ORAL DAILY
Status: DISCONTINUED | OUTPATIENT
Start: 2022-01-01 | End: 2022-01-01 | Stop reason: HOSPADM

## 2022-01-01 RX ORDER — LETROZOLE 2.5 MG/1
2.5 TABLET, FILM COATED ORAL DAILY
Status: ON HOLD
Start: 2022-01-01 | End: 2022-01-01

## 2022-01-01 RX ORDER — METOPROLOL TARTRATE 25 MG/1
25 TABLET, FILM COATED ORAL 2 TIMES DAILY
Status: DISCONTINUED | OUTPATIENT
Start: 2022-01-01 | End: 2022-01-01 | Stop reason: HOSPADM

## 2022-01-01 RX ORDER — FLUCONAZOLE 40 MG/ML
50 POWDER, FOR SUSPENSION ORAL DAILY
Status: DISCONTINUED | OUTPATIENT
Start: 2022-01-01 | End: 2022-01-01

## 2022-01-01 RX ORDER — GLUCAGON 1 MG
1 KIT INJECTION
Status: DISCONTINUED | OUTPATIENT
Start: 2022-01-01 | End: 2022-01-01 | Stop reason: HOSPADM

## 2022-01-01 RX ORDER — ADENOSINE 3 MG/ML
INJECTION INTRAVENOUS CODE/TRAUMA/SEDATION MEDICATION
Status: COMPLETED | OUTPATIENT
Start: 2022-01-01 | End: 2022-01-01

## 2022-01-01 RX ORDER — INSULIN ASPART 100 [IU]/ML
0-5 INJECTION, SOLUTION INTRAVENOUS; SUBCUTANEOUS
Status: DISCONTINUED | OUTPATIENT
Start: 2022-01-01 | End: 2022-01-01 | Stop reason: HOSPADM

## 2022-01-01 RX ORDER — HYDROCODONE BITARTRATE AND ACETAMINOPHEN 500; 5 MG/1; MG/1
TABLET ORAL CONTINUOUS
Status: DISCONTINUED | OUTPATIENT
Start: 2022-01-01 | End: 2022-04-07 | Stop reason: HOSPADM

## 2022-01-01 RX ORDER — METOPROLOL TARTRATE 25 MG/1
12.5 TABLET ORAL 2 TIMES DAILY
Status: DISCONTINUED | OUTPATIENT
Start: 2022-01-01 | End: 2022-01-01

## 2022-01-01 RX ORDER — MAGNESIUM SULFATE HEPTAHYDRATE 40 MG/ML
2 INJECTION, SOLUTION INTRAVENOUS
Status: ACTIVE | OUTPATIENT
Start: 2022-01-01 | End: 2022-01-01

## 2022-01-01 RX ORDER — FENTANYL CITRATE 50 UG/ML
25 INJECTION, SOLUTION INTRAMUSCULAR; INTRAVENOUS
Status: COMPLETED | OUTPATIENT
Start: 2022-01-01 | End: 2022-01-01

## 2022-01-01 RX ORDER — ALBUTEROL SULFATE 90 UG/1
2 AEROSOL, METERED RESPIRATORY (INHALATION) EVERY 6 HOURS PRN
Status: DISCONTINUED | OUTPATIENT
Start: 2022-01-01 | End: 2022-01-01 | Stop reason: HOSPADM

## 2022-01-01 RX ORDER — METHOCARBAMOL 500 MG/1
500 TABLET, FILM COATED ORAL 3 TIMES DAILY
COMMUNITY
Start: 2021-01-01 | End: 2022-01-01 | Stop reason: SDUPTHER

## 2022-01-01 RX ORDER — INSULIN ASPART 100 [IU]/ML
5 INJECTION, SOLUTION INTRAVENOUS; SUBCUTANEOUS
Status: DISCONTINUED | OUTPATIENT
Start: 2022-01-01 | End: 2022-01-01

## 2022-01-01 RX ORDER — MAGNESIUM SULFATE HEPTAHYDRATE 40 MG/ML
2 INJECTION, SOLUTION INTRAVENOUS ONCE
Status: COMPLETED | OUTPATIENT
Start: 2022-01-01 | End: 2022-01-01

## 2022-01-01 RX ORDER — ALBUTEROL SULFATE 2.5 MG/.5ML
10 SOLUTION RESPIRATORY (INHALATION)
Status: COMPLETED | OUTPATIENT
Start: 2022-01-01 | End: 2022-01-01

## 2022-01-01 RX ORDER — LISINOPRIL 2.5 MG/1
2.5 TABLET ORAL DAILY
Status: DISCONTINUED | OUTPATIENT
Start: 2022-01-01 | End: 2022-01-01

## 2022-01-01 RX ORDER — ONDANSETRON 2 MG/ML
4 INJECTION INTRAMUSCULAR; INTRAVENOUS EVERY 6 HOURS PRN
Status: DISCONTINUED | OUTPATIENT
Start: 2022-01-01 | End: 2022-01-01 | Stop reason: HOSPADM

## 2022-01-01 RX ORDER — LOPERAMIDE HYDROCHLORIDE 2 MG/1
2 CAPSULE ORAL 4 TIMES DAILY PRN
Status: DISCONTINUED | OUTPATIENT
Start: 2022-01-01 | End: 2022-01-01 | Stop reason: HOSPADM

## 2022-01-01 RX ORDER — ATORVASTATIN CALCIUM 20 MG/1
80 TABLET, FILM COATED ORAL DAILY
Status: DISCONTINUED | OUTPATIENT
Start: 2022-01-01 | End: 2022-04-07 | Stop reason: HOSPADM

## 2022-01-01 RX ORDER — INSULIN ASPART 100 [IU]/ML
0-5 INJECTION, SOLUTION INTRAVENOUS; SUBCUTANEOUS EVERY 6 HOURS PRN
Status: DISCONTINUED | OUTPATIENT
Start: 2022-01-01 | End: 2022-04-07 | Stop reason: HOSPADM

## 2022-01-01 RX ORDER — NAPROXEN SODIUM 220 MG/1
324 TABLET, FILM COATED ORAL
Status: COMPLETED | OUTPATIENT
Start: 2022-01-01 | End: 2022-01-01

## 2022-01-01 RX ORDER — PROPOFOL 10 MG/ML
0-50 INJECTION, EMULSION INTRAVENOUS CONTINUOUS
Status: DISCONTINUED | OUTPATIENT
Start: 2022-01-01 | End: 2022-01-01

## 2022-01-01 RX ORDER — AMIODARONE HYDROCHLORIDE 200 MG/1
200 TABLET ORAL DAILY
Status: DISCONTINUED | OUTPATIENT
Start: 2022-01-01 | End: 2022-01-01 | Stop reason: HOSPADM

## 2022-01-01 RX ORDER — METHOCARBAMOL 500 MG/1
500 TABLET, FILM COATED ORAL 3 TIMES DAILY
Qty: 60 TABLET | Refills: 3 | Status: SHIPPED | OUTPATIENT
Start: 2022-01-01

## 2022-01-01 RX ORDER — DIPHENHYDRAMINE HCL 50 MG
50 CAPSULE ORAL
Status: DISCONTINUED | OUTPATIENT
Start: 2022-01-01 | End: 2022-01-01 | Stop reason: HOSPADM

## 2022-01-01 RX ORDER — INSULIN ASPART 100 [IU]/ML
0-5 INJECTION, SOLUTION INTRAVENOUS; SUBCUTANEOUS EVERY 6 HOURS PRN
Status: DISCONTINUED | OUTPATIENT
Start: 2022-01-01 | End: 2022-01-01 | Stop reason: HOSPADM

## 2022-01-01 RX ORDER — CLOPIDOGREL BISULFATE 75 MG/1
75 TABLET ORAL DAILY
Qty: 90 TABLET | Refills: 3 | OUTPATIENT
Start: 2022-01-01 | End: 2023-02-08

## 2022-01-01 RX ORDER — CLOPIDOGREL BISULFATE 75 MG/1
225 TABLET ORAL ONCE
Status: COMPLETED | OUTPATIENT
Start: 2022-01-01 | End: 2022-01-01

## 2022-01-01 RX ORDER — POLYETHYLENE GLYCOL 3350 17 G/17G
17 POWDER, FOR SOLUTION ORAL 2 TIMES DAILY PRN
Status: DISCONTINUED | OUTPATIENT
Start: 2022-01-01 | End: 2022-04-07 | Stop reason: HOSPADM

## 2022-01-01 RX ORDER — HEPARIN SOD,PORCINE/0.9 % NACL 1000/500ML
INTRAVENOUS SOLUTION INTRAVENOUS
Status: DISCONTINUED | OUTPATIENT
Start: 2022-01-01 | End: 2022-01-01 | Stop reason: HOSPADM

## 2022-01-01 RX ORDER — CLOPIDOGREL BISULFATE 75 MG/1
75 TABLET ORAL DAILY
Status: DISCONTINUED | OUTPATIENT
Start: 2022-01-01 | End: 2022-01-01

## 2022-01-01 RX ORDER — MAGNESIUM SULFATE HEPTAHYDRATE 40 MG/ML
2 INJECTION, SOLUTION INTRAVENOUS ONCE
Status: DISCONTINUED | OUTPATIENT
Start: 2022-01-01 | End: 2022-01-01

## 2022-01-01 RX ORDER — AMIODARONE HYDROCHLORIDE 200 MG/1
200 TABLET ORAL DAILY
Status: DISCONTINUED | OUTPATIENT
Start: 2022-04-07 | End: 2022-04-07 | Stop reason: HOSPADM

## 2022-01-01 RX ORDER — DIPHENHYDRAMINE HYDROCHLORIDE 50 MG/ML
INJECTION INTRAMUSCULAR; INTRAVENOUS
Status: DISCONTINUED | OUTPATIENT
Start: 2022-01-01 | End: 2022-01-01 | Stop reason: HOSPADM

## 2022-01-01 RX ORDER — FENTANYL CITRATE 50 UG/ML
INJECTION, SOLUTION INTRAMUSCULAR; INTRAVENOUS
Status: DISCONTINUED
Start: 2022-01-01 | End: 2022-01-01 | Stop reason: WASHOUT

## 2022-01-01 RX ORDER — LANOLIN ALCOHOL/MO/W.PET/CERES
800 CREAM (GRAM) TOPICAL
Status: DISCONTINUED | OUTPATIENT
Start: 2022-01-01 | End: 2022-01-01

## 2022-01-01 RX ORDER — IPRATROPIUM BROMIDE AND ALBUTEROL SULFATE 2.5; .5 MG/3ML; MG/3ML
3 SOLUTION RESPIRATORY (INHALATION)
Status: COMPLETED | OUTPATIENT
Start: 2022-01-01 | End: 2022-01-01

## 2022-01-01 RX ORDER — HEPARIN SODIUM,PORCINE/D5W 25000/250
0-40 INTRAVENOUS SOLUTION INTRAVENOUS CONTINUOUS
Status: DISCONTINUED | OUTPATIENT
Start: 2022-01-01 | End: 2022-04-07 | Stop reason: HOSPADM

## 2022-01-01 RX ORDER — SEVELAMER CARBONATE 800 MG/1
1600 TABLET, FILM COATED ORAL
Status: DISCONTINUED | OUTPATIENT
Start: 2022-01-01 | End: 2022-01-01 | Stop reason: HOSPADM

## 2022-01-01 RX ORDER — ADENOSINE 3 MG/ML
INJECTION, SOLUTION INTRAVENOUS
Status: COMPLETED
Start: 2022-01-01 | End: 2022-01-01

## 2022-01-01 RX ORDER — SODIUM CHLORIDE 9 MG/ML
INJECTION, SOLUTION INTRAVENOUS
Status: DISCONTINUED | OUTPATIENT
Start: 2022-01-01 | End: 2022-01-01

## 2022-01-01 RX ORDER — FENTANYL CITRATE 50 UG/ML
25 INJECTION, SOLUTION INTRAMUSCULAR; INTRAVENOUS
Status: DISCONTINUED | OUTPATIENT
Start: 2022-01-01 | End: 2022-04-07 | Stop reason: HOSPADM

## 2022-01-01 RX ORDER — GLUCAGON 1 MG
1 KIT INJECTION
Status: DISCONTINUED | OUTPATIENT
Start: 2022-01-01 | End: 2022-01-01

## 2022-01-01 RX ORDER — ASPIRIN 81 MG/1
81 TABLET ORAL DAILY
Qty: 30 TABLET | Refills: 0 | Status: SHIPPED | OUTPATIENT
Start: 2022-01-01 | End: 2022-01-01 | Stop reason: CLARIF

## 2022-01-01 RX ORDER — ALBUTEROL SULFATE 90 UG/1
2 AEROSOL, METERED RESPIRATORY (INHALATION) EVERY 6 HOURS PRN
Qty: 18 G | Refills: 3 | Status: SHIPPED | OUTPATIENT
Start: 2022-01-01 | End: 2023-01-31

## 2022-01-01 RX ORDER — PHENYLEPHRINE HCL IN 0.9% NACL 20MG/250ML
PLASTIC BAG, INJECTION (ML) INTRAVENOUS
Status: COMPLETED
Start: 2022-01-01 | End: 2022-01-01

## 2022-01-01 RX ORDER — MAGNESIUM SULFATE HEPTAHYDRATE 40 MG/ML
2 INJECTION, SOLUTION INTRAVENOUS
Status: DISCONTINUED | OUTPATIENT
Start: 2022-01-01 | End: 2022-04-07 | Stop reason: HOSPADM

## 2022-01-01 RX ORDER — ACETAMINOPHEN 325 MG/1
650 TABLET ORAL EVERY 8 HOURS PRN
Status: DISCONTINUED | OUTPATIENT
Start: 2022-01-01 | End: 2022-04-07 | Stop reason: HOSPADM

## 2022-01-01 RX ORDER — INDOMETHACIN 25 MG/1
50 CAPSULE ORAL
Status: COMPLETED | OUTPATIENT
Start: 2022-01-01 | End: 2022-01-01

## 2022-01-01 RX ORDER — IBUPROFEN 200 MG
16 TABLET ORAL
Status: DISCONTINUED | OUTPATIENT
Start: 2022-01-01 | End: 2022-01-01

## 2022-01-01 RX ORDER — SODIUM CHLORIDE 0.9 % (FLUSH) 0.9 %
10 SYRINGE (ML) INJECTION EVERY 12 HOURS PRN
Status: DISCONTINUED | OUTPATIENT
Start: 2022-01-01 | End: 2022-01-01 | Stop reason: HOSPADM

## 2022-01-01 RX ORDER — METHOCARBAMOL 500 MG/1
500 TABLET, FILM COATED ORAL 3 TIMES DAILY
Status: DISCONTINUED | OUTPATIENT
Start: 2022-01-01 | End: 2022-01-01 | Stop reason: HOSPADM

## 2022-01-01 RX ORDER — FENTANYL CITRATE-0.9 % NACL/PF 10 MCG/ML
0-300 PLASTIC BAG, INJECTION (ML) INTRAVENOUS CONTINUOUS
Status: DISCONTINUED | OUTPATIENT
Start: 2022-01-01 | End: 2022-01-01

## 2022-01-01 RX ORDER — BISACODYL 10 MG
10 SUPPOSITORY, RECTAL RECTAL DAILY PRN
Status: DISCONTINUED | OUTPATIENT
Start: 2022-01-01 | End: 2022-01-01 | Stop reason: HOSPADM

## 2022-01-01 RX ORDER — PHENYLEPHRINE HCL IN 0.9% NACL 1 MG/10 ML
SYRINGE (ML) INTRAVENOUS
Status: COMPLETED
Start: 2022-01-01 | End: 2022-01-01

## 2022-01-01 RX ORDER — ALBUTEROL SULFATE 2.5 MG/.5ML
15 SOLUTION RESPIRATORY (INHALATION)
Status: COMPLETED | OUTPATIENT
Start: 2022-01-01 | End: 2022-01-01

## 2022-01-01 RX ORDER — METOPROLOL TARTRATE 25 MG/1
25 TABLET, FILM COATED ORAL 2 TIMES DAILY
Qty: 60 TABLET | Refills: 2 | Status: SHIPPED | OUTPATIENT
Start: 2022-01-01 | End: 2023-02-08

## 2022-01-01 RX ORDER — SODIUM CHLORIDE 0.9 % (FLUSH) 0.9 %
3 SYRINGE (ML) INJECTION
Status: CANCELLED | OUTPATIENT
Start: 2022-01-01

## 2022-01-01 RX ORDER — DEXMEDETOMIDINE HYDROCHLORIDE 4 UG/ML
0-1.4 INJECTION, SOLUTION INTRAVENOUS CONTINUOUS
Status: DISCONTINUED | OUTPATIENT
Start: 2022-01-01 | End: 2022-04-07 | Stop reason: HOSPADM

## 2022-01-01 RX ORDER — NITROGLYCERIN 0.3 MG/1
0.3 TABLET SUBLINGUAL EVERY 5 MIN PRN
Qty: 15 TABLET | Refills: 0 | Status: SHIPPED | OUTPATIENT
Start: 2022-01-01 | End: 2023-02-08

## 2022-01-01 RX ORDER — GABAPENTIN 100 MG/1
100 CAPSULE ORAL NIGHTLY
Status: DISCONTINUED | OUTPATIENT
Start: 2022-01-01 | End: 2022-01-01 | Stop reason: HOSPADM

## 2022-01-01 RX ORDER — GABAPENTIN 300 MG/1
300 CAPSULE ORAL NIGHTLY
Status: DISCONTINUED | OUTPATIENT
Start: 2022-01-01 | End: 2022-01-01

## 2022-01-01 RX ORDER — OMEGA-3/DHA/EPA/FISH OIL 300-1000MG
1 CAPSULE,DELAYED RELEASE (ENTERIC COATED) ORAL EVERY MORNING
Status: DISCONTINUED | OUTPATIENT
Start: 2022-01-01 | End: 2022-01-01

## 2022-01-01 RX ORDER — TALC
6 POWDER (GRAM) TOPICAL NIGHTLY PRN
Status: DISCONTINUED | OUTPATIENT
Start: 2022-01-01 | End: 2022-01-01 | Stop reason: HOSPADM

## 2022-01-01 RX ORDER — PROCHLORPERAZINE EDISYLATE 5 MG/ML
5 INJECTION INTRAMUSCULAR; INTRAVENOUS EVERY 30 MIN PRN
Status: CANCELLED | OUTPATIENT
Start: 2022-01-01

## 2022-01-01 RX ORDER — ETOMIDATE 2 MG/ML
INJECTION INTRAVENOUS
Status: DISCONTINUED | OUTPATIENT
Start: 2022-01-01 | End: 2022-01-01 | Stop reason: HOSPADM

## 2022-01-01 RX ORDER — KETAMINE HCL IN 0.9 % NACL 50 MG/5 ML
SYRINGE (ML) INTRAVENOUS
Status: DISCONTINUED | OUTPATIENT
Start: 2022-01-01 | End: 2022-01-01 | Stop reason: HOSPADM

## 2022-01-01 RX ORDER — SODIUM CHLORIDE 9 MG/ML
INJECTION, SOLUTION INTRAVENOUS CONTINUOUS
Status: DISCONTINUED | OUTPATIENT
Start: 2022-01-01 | End: 2022-01-01

## 2022-01-01 RX ORDER — OMEGA-3-ACID ETHYL ESTERS 1 G/1
1 CAPSULE, LIQUID FILLED ORAL EVERY MORNING
Status: DISCONTINUED | OUTPATIENT
Start: 2022-01-01 | End: 2022-01-01 | Stop reason: HOSPADM

## 2022-01-01 RX ORDER — MIDAZOLAM HYDROCHLORIDE 2 MG/2ML
INJECTION, SOLUTION INTRAMUSCULAR; INTRAVENOUS
Status: DISCONTINUED | OUTPATIENT
Start: 2022-01-01 | End: 2022-01-01 | Stop reason: HOSPADM

## 2022-01-01 RX ORDER — SODIUM CHLORIDE 0.9 % (FLUSH) 0.9 %
10 SYRINGE (ML) INJECTION
Status: DISCONTINUED | OUTPATIENT
Start: 2022-01-01 | End: 2022-01-01 | Stop reason: HOSPADM

## 2022-01-01 RX ORDER — SODIUM CHLORIDE 0.9 % (FLUSH) 0.9 %
10 SYRINGE (ML) INJECTION EVERY 8 HOURS PRN
Status: DISCONTINUED | OUTPATIENT
Start: 2022-01-01 | End: 2022-01-01 | Stop reason: HOSPADM

## 2022-01-01 RX ORDER — INSULIN ASPART 100 [IU]/ML
3 INJECTION, SOLUTION INTRAVENOUS; SUBCUTANEOUS
Status: DISCONTINUED | OUTPATIENT
Start: 2022-01-01 | End: 2022-01-01 | Stop reason: HOSPADM

## 2022-01-01 RX ORDER — CEFAZOLIN SODIUM 1 G/3ML
INJECTION, POWDER, FOR SOLUTION INTRAMUSCULAR; INTRAVENOUS
Status: DISCONTINUED | OUTPATIENT
Start: 2022-01-01 | End: 2022-01-01 | Stop reason: HOSPADM

## 2022-01-01 RX ORDER — HEPARIN SODIUM 1000 [USP'U]/ML
1000 INJECTION, SOLUTION INTRAVENOUS; SUBCUTANEOUS
Status: DISCONTINUED | OUTPATIENT
Start: 2022-01-01 | End: 2022-04-07 | Stop reason: HOSPADM

## 2022-01-01 RX ORDER — CLOPIDOGREL 300 MG/1
300 TABLET, FILM COATED ORAL ONCE
Status: COMPLETED | OUTPATIENT
Start: 2022-01-01 | End: 2022-01-01

## 2022-01-01 RX ORDER — HEPARIN SODIUM 1000 [USP'U]/ML
1000 INJECTION, SOLUTION INTRAVENOUS; SUBCUTANEOUS
Status: DISCONTINUED | OUTPATIENT
Start: 2022-01-01 | End: 2022-01-01

## 2022-01-01 RX ORDER — ONDANSETRON 8 MG/1
8 TABLET, ORALLY DISINTEGRATING ORAL EVERY 8 HOURS PRN
Status: DISCONTINUED | OUTPATIENT
Start: 2022-01-01 | End: 2022-01-01

## 2022-01-01 RX ORDER — TALC
6 POWDER (GRAM) TOPICAL NIGHTLY
Status: DISCONTINUED | OUTPATIENT
Start: 2022-01-01 | End: 2022-01-01

## 2022-01-01 RX ORDER — ATORVASTATIN CALCIUM 20 MG/1
40 TABLET, FILM COATED ORAL DAILY
Status: DISCONTINUED | OUTPATIENT
Start: 2022-01-01 | End: 2022-01-01 | Stop reason: HOSPADM

## 2022-01-01 RX ORDER — ATORVASTATIN CALCIUM 40 MG/1
TABLET, FILM COATED ORAL
Status: ON HOLD | COMMUNITY
Start: 2021-01-01 | End: 2022-01-01 | Stop reason: HOSPADM

## 2022-01-01 RX ORDER — ROCURONIUM BROMIDE 10 MG/ML
INJECTION, SOLUTION INTRAVENOUS
Status: DISCONTINUED | OUTPATIENT
Start: 2022-01-01 | End: 2022-01-01 | Stop reason: HOSPADM

## 2022-01-01 RX ORDER — VASOPRESSIN 20 [USP'U]/ML
INJECTION, SOLUTION INTRAMUSCULAR; SUBCUTANEOUS
Status: DISPENSED
Start: 2022-01-01 | End: 2022-01-01

## 2022-01-01 RX ORDER — FENTANYL CITRATE-0.9 % NACL/PF 10 MCG/ML
0-250 PLASTIC BAG, INJECTION (ML) INTRAVENOUS CONTINUOUS
Status: DISCONTINUED | OUTPATIENT
Start: 2022-01-01 | End: 2022-01-01

## 2022-01-01 RX ORDER — GLUCAGON 1 MG
1 KIT INJECTION
Status: DISCONTINUED | OUTPATIENT
Start: 2022-01-01 | End: 2022-04-07 | Stop reason: HOSPADM

## 2022-01-01 RX ORDER — NAPROXEN SODIUM 220 MG/1
324 TABLET, FILM COATED ORAL ONCE
Status: COMPLETED | OUTPATIENT
Start: 2022-01-01 | End: 2022-01-01

## 2022-01-01 RX ORDER — DIPHENHYDRAMINE HCL 50 MG
50 CAPSULE ORAL ONCE
Status: DISCONTINUED | OUTPATIENT
Start: 2022-01-01 | End: 2022-01-01 | Stop reason: HOSPADM

## 2022-01-01 RX ORDER — NOREPINEPHRINE BITARTRATE/D5W 4MG/250ML
0-3 PLASTIC BAG, INJECTION (ML) INTRAVENOUS CONTINUOUS
Status: DISCONTINUED | OUTPATIENT
Start: 2022-01-01 | End: 2022-01-01

## 2022-01-01 RX ORDER — HALOPERIDOL 5 MG/ML
0.5 INJECTION INTRAMUSCULAR EVERY 10 MIN PRN
Status: CANCELLED | OUTPATIENT
Start: 2022-01-01

## 2022-01-01 RX ORDER — IPRATROPIUM BROMIDE AND ALBUTEROL SULFATE 2.5; .5 MG/3ML; MG/3ML
3 SOLUTION RESPIRATORY (INHALATION) EVERY 4 HOURS PRN
Status: DISCONTINUED | OUTPATIENT
Start: 2022-01-01 | End: 2022-01-01 | Stop reason: HOSPADM

## 2022-01-01 RX ORDER — DIPHENHYDRAMINE HYDROCHLORIDE 50 MG/ML
25 INJECTION INTRAMUSCULAR; INTRAVENOUS EVERY 6 HOURS PRN
Status: CANCELLED | OUTPATIENT
Start: 2022-01-01

## 2022-01-01 RX ORDER — POLYETHYLENE GLYCOL 3350 17 G/17G
17 POWDER, FOR SOLUTION ORAL DAILY PRN
Status: DISCONTINUED | OUTPATIENT
Start: 2022-01-01 | End: 2022-01-01 | Stop reason: HOSPADM

## 2022-01-01 RX ORDER — ACETAMINOPHEN AND CODEINE PHOSPHATE 300; 30 MG/1; MG/1
1 TABLET ORAL EVERY 8 HOURS PRN
Qty: 90 TABLET | Refills: 1 | Status: SHIPPED | OUTPATIENT
Start: 2022-01-01

## 2022-01-01 RX ADMIN — ATORVASTATIN CALCIUM 80 MG: 20 TABLET, FILM COATED ORAL at 10:04

## 2022-01-01 RX ADMIN — Medication 16 G: at 02:02

## 2022-01-01 RX ADMIN — POTASSIUM & SODIUM PHOSPHATES POWDER PACK 280-160-250 MG 2 PACKET: 280-160-250 PACK at 05:04

## 2022-01-01 RX ADMIN — SODIUM CHLORIDE: 0.9 INJECTION, SOLUTION INTRAVENOUS at 11:04

## 2022-01-01 RX ADMIN — OMEGA-3-ACID ETHYL ESTERS 1 G: 1 CAPSULE, LIQUID FILLED ORAL at 06:03

## 2022-01-01 RX ADMIN — SODIUM CHLORIDE 200 ML/HR: 0.9 INJECTION, SOLUTION INTRAVENOUS at 02:04

## 2022-01-01 RX ADMIN — SODIUM CHLORIDE 100 ML/HR: 0.9 INJECTION, SOLUTION INTRAVENOUS at 01:02

## 2022-01-01 RX ADMIN — METOPROLOL TARTRATE 25 MG: 25 TABLET, FILM COATED ORAL at 09:04

## 2022-01-01 RX ADMIN — INSULIN HUMAN 10 UNITS: 100 INJECTION, SOLUTION PARENTERAL at 08:02

## 2022-01-01 RX ADMIN — Medication 150 MCG/HR: at 06:04

## 2022-01-01 RX ADMIN — AMIODARONE HYDROCHLORIDE 200 MG: 200 TABLET ORAL at 08:03

## 2022-01-01 RX ADMIN — ASPIRIN 81 MG CHEWABLE TABLET 324 MG: 81 TABLET CHEWABLE at 08:03

## 2022-01-01 RX ADMIN — INSULIN DETEMIR 4 UNITS: 100 INJECTION, SOLUTION SUBCUTANEOUS at 10:03

## 2022-01-01 RX ADMIN — CLOPIDOGREL 75 MG: 75 TABLET, FILM COATED ORAL at 08:02

## 2022-01-01 RX ADMIN — PROPOFOL 30 MCG/KG/MIN: 10 INJECTION, EMULSION INTRAVENOUS at 09:04

## 2022-01-01 RX ADMIN — NOREPINEPHRINE BITARTRATE 0.06 MCG/KG/MIN: 8 INJECTION, SOLUTION INTRAVENOUS at 09:04

## 2022-01-01 RX ADMIN — Medication 6 MG: at 09:04

## 2022-01-01 RX ADMIN — ALBUTEROL SULFATE 10 MG: 2.5 SOLUTION RESPIRATORY (INHALATION) at 01:02

## 2022-01-01 RX ADMIN — APIXABAN 5 MG: 5 TABLET, FILM COATED ORAL at 08:02

## 2022-01-01 RX ADMIN — METHOCARBAMOL 500 MG: 500 TABLET ORAL at 02:03

## 2022-01-01 RX ADMIN — IPRATROPIUM BROMIDE AND ALBUTEROL SULFATE 3 ML: .5; 2.5 SOLUTION RESPIRATORY (INHALATION) at 12:02

## 2022-01-01 RX ADMIN — IPRATROPIUM BROMIDE AND ALBUTEROL SULFATE 3 ML: 2.5; .5 SOLUTION RESPIRATORY (INHALATION) at 12:03

## 2022-01-01 RX ADMIN — MICONAZOLE NITRATE: 20 POWDER TOPICAL at 09:04

## 2022-01-01 RX ADMIN — METOPROLOL TARTRATE 25 MG: 25 TABLET, FILM COATED ORAL at 10:03

## 2022-01-01 RX ADMIN — CHLORHEXIDINE GLUCONATE 0.12% ORAL RINSE 15 ML: 1.2 LIQUID ORAL at 08:04

## 2022-01-01 RX ADMIN — MICONAZOLE NITRATE: 20 OINTMENT TOPICAL at 08:02

## 2022-01-01 RX ADMIN — LISINOPRIL 2.5 MG: 2.5 TABLET ORAL at 08:04

## 2022-01-01 RX ADMIN — CHLORHEXIDINE GLUCONATE 0.12% ORAL RINSE 15 ML: 1.2 LIQUID ORAL at 09:04

## 2022-01-01 RX ADMIN — PIPERACILLIN SODIUM AND TAZOBACTAM SODIUM 4.5 G: 4; .5 INJECTION, POWDER, FOR SOLUTION INTRAVENOUS at 11:04

## 2022-01-01 RX ADMIN — MUPIROCIN: 20 OINTMENT TOPICAL at 09:03

## 2022-01-01 RX ADMIN — FAMOTIDINE 20 MG: 10 INJECTION INTRAVENOUS at 09:04

## 2022-01-01 RX ADMIN — PROPOFOL 35 MCG/KG/MIN: 10 INJECTION, EMULSION INTRAVENOUS at 03:04

## 2022-01-01 RX ADMIN — DEXMEDETOMIDINE HYDROCHLORIDE 0.8 MCG/KG/HR: 400 INJECTION, SOLUTION INTRAVENOUS at 11:04

## 2022-01-01 RX ADMIN — CALCIUM GLUCONATE 1 G: 98 INJECTION, SOLUTION INTRAVENOUS at 01:03

## 2022-01-01 RX ADMIN — SODIUM ZIRCONIUM CYCLOSILICATE 10 G: 10 POWDER, FOR SUSPENSION ORAL at 03:02

## 2022-01-01 RX ADMIN — INSULIN DETEMIR 4 UNITS: 100 INJECTION, SOLUTION SUBCUTANEOUS at 08:03

## 2022-01-01 RX ADMIN — MICONAZOLE NITRATE: 20 POWDER TOPICAL at 10:04

## 2022-01-01 RX ADMIN — FAMOTIDINE 20 MG: 10 INJECTION INTRAVENOUS at 08:04

## 2022-01-01 RX ADMIN — CITALOPRAM HYDROBROMIDE 20 MG: 20 TABLET ORAL at 08:02

## 2022-01-01 RX ADMIN — Medication 50 MCG/HR: at 05:04

## 2022-01-01 RX ADMIN — Medication 250 MCG/HR: at 12:04

## 2022-01-01 RX ADMIN — FENTANYL CITRATE 25 MCG: 50 INJECTION INTRAMUSCULAR; INTRAVENOUS at 03:04

## 2022-01-01 RX ADMIN — INSULIN DETEMIR 2 UNITS: 100 INJECTION, SOLUTION SUBCUTANEOUS at 09:04

## 2022-01-01 RX ADMIN — NOREPINEPHRINE BITARTRATE 0.04 MCG/KG/MIN: 8 INJECTION, SOLUTION INTRAVENOUS at 04:04

## 2022-01-01 RX ADMIN — ATORVASTATIN CALCIUM 40 MG: 40 TABLET, FILM COATED ORAL at 08:03

## 2022-01-01 RX ADMIN — MICONAZOLE NITRATE: 20 POWDER TOPICAL at 08:04

## 2022-01-01 RX ADMIN — ASPIRIN 81 MG: 81 TABLET, COATED ORAL at 08:02

## 2022-01-01 RX ADMIN — Medication 1 CAPSULE: at 09:03

## 2022-01-01 RX ADMIN — DEXMEDETOMIDINE HYDROCHLORIDE 0.6 MCG/KG/HR: 400 INJECTION, SOLUTION INTRAVENOUS at 11:04

## 2022-01-01 RX ADMIN — FENTANYL CITRATE 25 MCG: 50 INJECTION INTRAMUSCULAR; INTRAVENOUS at 04:04

## 2022-01-01 RX ADMIN — HEPARIN SODIUM 15 UNITS/KG/HR: 5000 INJECTION INTRAVENOUS; SUBCUTANEOUS at 08:03

## 2022-01-01 RX ADMIN — MAGNESIUM SULFATE 2 G: 2 INJECTION INTRAVENOUS at 10:04

## 2022-01-01 RX ADMIN — LOPERAMIDE HYDROCHLORIDE 2 MG: 2 CAPSULE ORAL at 02:02

## 2022-01-01 RX ADMIN — TICAGRELOR 90 MG: 90 TABLET ORAL at 09:04

## 2022-01-01 RX ADMIN — CALCIUM GLUCONATE 1 G: 98 INJECTION, SOLUTION INTRAVENOUS at 01:02

## 2022-01-01 RX ADMIN — CALCIUM GLUCONATE 1 G: 98 INJECTION, SOLUTION INTRAVENOUS at 08:02

## 2022-01-01 RX ADMIN — METHOCARBAMOL 500 MG: 500 TABLET ORAL at 04:03

## 2022-01-01 RX ADMIN — SODIUM PHOSPHATE, MONOBASIC, MONOHYDRATE 20.01 MMOL: 276; 142 INJECTION, SOLUTION INTRAVENOUS at 01:04

## 2022-01-01 RX ADMIN — ALBUTEROL SULFATE 15 MG: 2.5 SOLUTION RESPIRATORY (INHALATION) at 12:03

## 2022-01-01 RX ADMIN — HEPARIN SODIUM 12 UNITS/KG/HR: 5000 INJECTION INTRAVENOUS; SUBCUTANEOUS at 09:03

## 2022-01-01 RX ADMIN — INSULIN HUMAN 5 UNITS: 100 INJECTION, SOLUTION PARENTERAL at 01:02

## 2022-01-01 RX ADMIN — DIPHENHYDRAMINE HYDROCHLORIDE 50 MG: 50 CAPSULE ORAL at 01:04

## 2022-01-01 RX ADMIN — Medication 0.5 MCG/KG/MIN: at 08:04

## 2022-01-01 RX ADMIN — PROPOFOL 30 MCG/KG/MIN: 10 INJECTION, EMULSION INTRAVENOUS at 08:04

## 2022-01-01 RX ADMIN — ASPIRIN 81 MG CHEWABLE TABLET 324 MG: 81 TABLET CHEWABLE at 01:02

## 2022-01-01 RX ADMIN — SEVELAMER CARBONATE 1600 MG: 800 TABLET, FILM COATED ORAL at 08:02

## 2022-01-01 RX ADMIN — DEXTROSE MONOHYDRATE 250 ML: 10 INJECTION, SOLUTION INTRAVENOUS at 01:02

## 2022-01-01 RX ADMIN — PIPERACILLIN SODIUM AND TAZOBACTAM SODIUM 4.5 G: 4; .5 INJECTION, POWDER, FOR SOLUTION INTRAVENOUS at 08:04

## 2022-01-01 RX ADMIN — METHOCARBAMOL 500 MG: 500 TABLET ORAL at 10:03

## 2022-01-01 RX ADMIN — FAMOTIDINE 20 MG: 10 INJECTION INTRAVENOUS at 10:04

## 2022-01-01 RX ADMIN — DEXMEDETOMIDINE HYDROCHLORIDE 0.2 MCG/KG/HR: 400 INJECTION, SOLUTION INTRAVENOUS at 10:04

## 2022-01-01 RX ADMIN — MUPIROCIN: 20 OINTMENT TOPICAL at 10:04

## 2022-01-01 RX ADMIN — ASPIRIN 81 MG CHEWABLE TABLET 81 MG: 81 TABLET CHEWABLE at 09:04

## 2022-01-01 RX ADMIN — CLOPIDOGREL BISULFATE 75 MG: 75 TABLET, FILM COATED ORAL at 12:04

## 2022-01-01 RX ADMIN — METOPROLOL TARTRATE 25 MG: 25 TABLET, FILM COATED ORAL at 10:02

## 2022-01-01 RX ADMIN — Medication 1 CAPSULE: at 08:03

## 2022-01-01 RX ADMIN — HEPARIN SODIUM 16 UNITS/KG/HR: 5000 INJECTION INTRAVENOUS; SUBCUTANEOUS at 04:04

## 2022-01-01 RX ADMIN — MUPIROCIN: 20 OINTMENT TOPICAL at 08:04

## 2022-01-01 RX ADMIN — ETOMIDATE 20 MG: 2 INJECTION, SOLUTION INTRAVENOUS at 02:04

## 2022-01-01 RX ADMIN — SODIUM CHLORIDE: 0.9 INJECTION, SOLUTION INTRAVENOUS at 06:04

## 2022-01-01 RX ADMIN — HEPARIN SODIUM 18 UNITS/KG/HR: 1000 INJECTION, SOLUTION INTRAVENOUS; SUBCUTANEOUS at 05:02

## 2022-01-01 RX ADMIN — PIPERACILLIN SODIUM AND TAZOBACTAM SODIUM 4.5 G: 4; .5 INJECTION, POWDER, FOR SOLUTION INTRAVENOUS at 12:04

## 2022-01-01 RX ADMIN — METOPROLOL TARTRATE 25 MG: 25 TABLET, FILM COATED ORAL at 08:03

## 2022-01-01 RX ADMIN — POLYETHYLENE GLYCOL 3350 17 G: 17 POWDER, FOR SOLUTION ORAL at 05:04

## 2022-01-01 RX ADMIN — INSULIN DETEMIR 2 UNITS: 100 INJECTION, SOLUTION SUBCUTANEOUS at 08:04

## 2022-01-01 RX ADMIN — ONDANSETRON 4 MG: 2 INJECTION INTRAMUSCULAR; INTRAVENOUS at 12:02

## 2022-01-01 RX ADMIN — NOREPINEPHRINE BITARTRATE 0.02 MCG/KG/MIN: 8 INJECTION, SOLUTION INTRAVENOUS at 02:04

## 2022-01-01 RX ADMIN — NOREPINEPHRINE BITARTRATE 0.06 MCG/KG/MIN: 8 INJECTION, SOLUTION INTRAVENOUS at 03:04

## 2022-01-01 RX ADMIN — PHENYLEPHRINE HYDROCHLORIDE 0.1 MCG/KG/MIN: 10 INJECTION INTRAVENOUS at 03:04

## 2022-01-01 RX ADMIN — ASPIRIN 81 MG CHEWABLE TABLET 81 MG: 81 TABLET CHEWABLE at 08:04

## 2022-01-01 RX ADMIN — CLOPIDOGREL 225 MG: 75 TABLET, FILM COATED ORAL at 08:02

## 2022-01-01 RX ADMIN — Medication 300 MCG/HR: at 02:04

## 2022-01-01 RX ADMIN — SODIUM CHLORIDE: 0.9 INJECTION, SOLUTION INTRAVENOUS at 05:04

## 2022-01-01 RX ADMIN — Medication 200 MCG/HR: at 08:04

## 2022-01-01 RX ADMIN — HEPARIN SODIUM 18.01 UNITS/KG/HR: 5000 INJECTION INTRAVENOUS; SUBCUTANEOUS at 07:04

## 2022-01-01 RX ADMIN — ATORVASTATIN CALCIUM 40 MG: 40 TABLET, FILM COATED ORAL at 08:02

## 2022-01-01 RX ADMIN — CLOPIDOGREL BISULFATE 75 MG: 75 TABLET, FILM COATED ORAL at 09:04

## 2022-01-01 RX ADMIN — CHLORHEXIDINE GLUCONATE 0.12% ORAL RINSE 15 ML: 1.2 LIQUID ORAL at 10:04

## 2022-01-01 RX ADMIN — OMEGA-3-ACID ETHYL ESTERS 1 G: 1 CAPSULE, LIQUID FILLED ORAL at 04:03

## 2022-01-01 RX ADMIN — VANCOMYCIN HYDROCHLORIDE 2000 MG: 10 INJECTION, POWDER, LYOPHILIZED, FOR SOLUTION INTRAVENOUS at 10:04

## 2022-01-01 RX ADMIN — TICAGRELOR 180 MG: 90 TABLET ORAL at 10:04

## 2022-01-01 RX ADMIN — MUPIROCIN: 20 OINTMENT TOPICAL at 09:04

## 2022-01-01 RX ADMIN — AMIODARONE HYDROCHLORIDE 200 MG: 200 TABLET ORAL at 09:04

## 2022-01-01 RX ADMIN — METOPROLOL TARTRATE 25 MG: 25 TABLET, FILM COATED ORAL at 09:03

## 2022-01-01 RX ADMIN — AMIODARONE HYDROCHLORIDE 200 MG: 200 TABLET ORAL at 09:02

## 2022-01-01 RX ADMIN — PIPERACILLIN SODIUM AND TAZOBACTAM SODIUM 4.5 G: 4; .5 INJECTION, POWDER, FOR SOLUTION INTRAVENOUS at 10:04

## 2022-01-01 RX ADMIN — INSULIN DETEMIR 2 UNITS: 100 INJECTION, SOLUTION SUBCUTANEOUS at 08:03

## 2022-01-01 RX ADMIN — SEVELAMER CARBONATE 1600 MG: 800 TABLET, FILM COATED ORAL at 08:03

## 2022-01-01 RX ADMIN — ATORVASTATIN CALCIUM 40 MG: 40 TABLET, FILM COATED ORAL at 10:03

## 2022-01-01 RX ADMIN — ASPIRIN 81 MG: 81 TABLET, COATED ORAL at 09:02

## 2022-01-01 RX ADMIN — SODIUM CHLORIDE: 0.9 INJECTION, SOLUTION INTRAVENOUS at 03:03

## 2022-01-01 RX ADMIN — AMIODARONE HYDROCHLORIDE 200 MG: 200 TABLET ORAL at 10:04

## 2022-01-01 RX ADMIN — ADENOSINE 6 MG: 3 SOLUTION INTRAVENOUS at 04:04

## 2022-01-01 RX ADMIN — NOREPINEPHRINE BITARTRATE 0.04 MCG/KG/MIN: 4 INJECTION, SOLUTION INTRAVENOUS at 07:04

## 2022-01-01 RX ADMIN — Medication 1 CAPSULE: at 10:03

## 2022-01-01 RX ADMIN — ATORVASTATIN CALCIUM 40 MG: 20 TABLET, FILM COATED ORAL at 02:02

## 2022-01-01 RX ADMIN — HEPARIN SODIUM 12 UNITS/KG/HR: 5000 INJECTION INTRAVENOUS; SUBCUTANEOUS at 05:04

## 2022-01-01 RX ADMIN — ROCURONIUM BROMIDE 100 MG: 10 INJECTION, SOLUTION INTRAVENOUS at 02:04

## 2022-01-01 RX ADMIN — METHOCARBAMOL 500 MG: 500 TABLET ORAL at 08:03

## 2022-01-01 RX ADMIN — MUPIROCIN: 20 OINTMENT TOPICAL at 12:03

## 2022-01-01 RX ADMIN — ATORVASTATIN CALCIUM 40 MG: 20 TABLET, FILM COATED ORAL at 09:02

## 2022-01-01 RX ADMIN — NOREPINEPHRINE BITARTRATE 0.04 MCG/KG/MIN: 8 INJECTION, SOLUTION INTRAVENOUS at 11:04

## 2022-01-01 RX ADMIN — PROPOFOL 25 MCG/KG/MIN: 10 INJECTION, EMULSION INTRAVENOUS at 03:04

## 2022-01-01 RX ADMIN — Medication 20 MG: at 02:04

## 2022-01-01 RX ADMIN — Medication 30 MG: at 02:04

## 2022-01-01 RX ADMIN — ATORVASTATIN CALCIUM 80 MG: 20 TABLET, FILM COATED ORAL at 08:04

## 2022-01-01 RX ADMIN — METHOCARBAMOL 500 MG: 500 TABLET ORAL at 09:03

## 2022-01-01 RX ADMIN — PIPERACILLIN SODIUM AND TAZOBACTAM SODIUM 4.5 G: 4; .5 INJECTION, POWDER, FOR SOLUTION INTRAVENOUS at 05:04

## 2022-01-01 RX ADMIN — ALBUTEROL SULFATE 10 MG: 2.5 SOLUTION RESPIRATORY (INHALATION) at 07:02

## 2022-01-01 RX ADMIN — ASPIRIN 81 MG CHEWABLE TABLET 81 MG: 81 TABLET CHEWABLE at 10:04

## 2022-01-01 RX ADMIN — INSULIN HUMAN 5 UNITS: 100 INJECTION, SOLUTION PARENTERAL at 01:03

## 2022-01-01 RX ADMIN — IPRATROPIUM BROMIDE AND ALBUTEROL SULFATE 3 ML: .5; 2.5 SOLUTION RESPIRATORY (INHALATION) at 11:02

## 2022-01-01 RX ADMIN — DEXTROSE 500 ML: 10 SOLUTION INTRAVENOUS at 04:02

## 2022-01-01 RX ADMIN — Medication 24 G: at 04:02

## 2022-01-01 RX ADMIN — DEXMEDETOMIDINE HYDROCHLORIDE 0.8 MCG/KG/HR: 400 INJECTION, SOLUTION INTRAVENOUS at 05:04

## 2022-01-01 RX ADMIN — TICAGRELOR 90 MG: 90 TABLET ORAL at 08:04

## 2022-01-01 RX ADMIN — ATORVASTATIN CALCIUM 80 MG: 20 TABLET, FILM COATED ORAL at 09:04

## 2022-01-01 RX ADMIN — Medication 6 MG: at 09:03

## 2022-01-01 RX ADMIN — ALBUTEROL SULFATE 10 MG: 2.5 SOLUTION RESPIRATORY (INHALATION) at 08:02

## 2022-01-01 RX ADMIN — HEPARIN SODIUM 5000 UNITS: 5000 INJECTION INTRAVENOUS; SUBCUTANEOUS at 09:04

## 2022-01-01 RX ADMIN — ALBUTEROL SULFATE 10 MG: 2.5 SOLUTION RESPIRATORY (INHALATION) at 06:03

## 2022-01-01 RX ADMIN — MICONAZOLE NITRATE: 20 OINTMENT TOPICAL at 09:02

## 2022-01-01 RX ADMIN — FENTANYL CITRATE 25 MCG: 50 INJECTION INTRAMUSCULAR; INTRAVENOUS at 06:04

## 2022-01-01 RX ADMIN — DEXTROSE 250 ML: 10 SOLUTION INTRAVENOUS at 12:03

## 2022-01-01 RX ADMIN — LOSARTAN POTASSIUM 25 MG: 25 TABLET, FILM COATED ORAL at 09:02

## 2022-01-01 RX ADMIN — PROPOFOL 35 MCG/KG/MIN: 10 INJECTION, EMULSION INTRAVENOUS at 04:04

## 2022-01-01 RX ADMIN — INSULIN HUMAN 6 UNITS: 100 INJECTION, SOLUTION PARENTERAL at 12:02

## 2022-01-01 RX ADMIN — SODIUM CHLORIDE: 0.9 INJECTION, SOLUTION INTRAVENOUS at 08:04

## 2022-01-01 RX ADMIN — HEPARIN SODIUM 14 UNITS/KG/HR: 5000 INJECTION INTRAVENOUS; SUBCUTANEOUS at 10:04

## 2022-01-01 RX ADMIN — LOPERAMIDE HYDROCHLORIDE 2 MG: 2 CAPSULE ORAL at 09:02

## 2022-01-01 RX ADMIN — PROPOFOL 35 MCG/KG/MIN: 10 INJECTION, EMULSION INTRAVENOUS at 10:04

## 2022-01-01 RX ADMIN — ATORVASTATIN CALCIUM 80 MG: 20 TABLET, FILM COATED ORAL at 08:03

## 2022-01-01 RX ADMIN — CLOPIDOGREL BISULFATE 300 MG: 300 TABLET, FILM COATED ORAL at 08:03

## 2022-01-01 RX ADMIN — NOREPINEPHRINE BITARTRATE 0.06 MCG/KG/MIN: 4 INJECTION, SOLUTION INTRAVENOUS at 08:04

## 2022-01-01 RX ADMIN — SODIUM CHLORIDE: 0.9 INJECTION, SOLUTION INTRAVENOUS at 02:04

## 2022-01-01 RX ADMIN — METOPROLOL TARTRATE 25 MG: 25 TABLET, FILM COATED ORAL at 09:02

## 2022-01-01 RX ADMIN — AMIODARONE HYDROCHLORIDE 200 MG: 200 TABLET ORAL at 02:02

## 2022-01-01 RX ADMIN — ASPIRIN 81 MG: 81 TABLET, COATED ORAL at 02:02

## 2022-01-01 RX ADMIN — METOPROLOL TARTRATE 25 MG: 25 TABLET, FILM COATED ORAL at 02:02

## 2022-01-01 RX ADMIN — AMIODARONE HYDROCHLORIDE 200 MG: 200 TABLET ORAL at 10:03

## 2022-01-01 RX ADMIN — METOPROLOL TARTRATE 25 MG: 25 TABLET, FILM COATED ORAL at 08:02

## 2022-01-01 RX ADMIN — Medication 212.5 MCG/HR: at 07:04

## 2022-01-01 RX ADMIN — VASOPRESSIN 0.04 UNITS/MIN: 20 INJECTION INTRAVENOUS at 03:04

## 2022-01-01 RX ADMIN — SODIUM CHLORIDE: 0.9 INJECTION, SOLUTION INTRAVENOUS at 12:04

## 2022-01-01 RX ADMIN — DIPHENHYDRAMINE HYDROCHLORIDE 50 MG: 50 CAPSULE ORAL at 08:02

## 2022-01-01 RX ADMIN — SODIUM CHLORIDE: 0.9 INJECTION, SOLUTION INTRAVENOUS at 02:03

## 2022-01-01 RX ADMIN — PIPERACILLIN SODIUM AND TAZOBACTAM SODIUM 4.5 G: 4; .5 INJECTION, POWDER, FOR SOLUTION INTRAVENOUS at 04:04

## 2022-01-01 RX ADMIN — MAGNESIUM SULFATE HEPTAHYDRATE 1 G: 500 INJECTION, SOLUTION INTRAMUSCULAR; INTRAVENOUS at 06:04

## 2022-01-01 RX ADMIN — SODIUM CHLORIDE: 0.9 INJECTION, SOLUTION INTRAVENOUS at 01:04

## 2022-01-01 RX ADMIN — ALBUTEROL SULFATE 2 PUFF: 108 INHALANT RESPIRATORY (INHALATION) at 09:03

## 2022-01-01 RX ADMIN — PROPOFOL 35 MCG/KG/MIN: 10 INJECTION, EMULSION INTRAVENOUS at 09:04

## 2022-01-01 RX ADMIN — PROPOFOL 30 MCG/KG/MIN: 10 INJECTION, EMULSION INTRAVENOUS at 06:04

## 2022-01-01 RX ADMIN — INSULIN ASPART 3 UNITS: 100 INJECTION, SOLUTION INTRAVENOUS; SUBCUTANEOUS at 10:03

## 2022-01-01 RX ADMIN — FENTANYL CITRATE 25 MCG: 50 INJECTION INTRAMUSCULAR; INTRAVENOUS at 07:04

## 2022-01-01 RX ADMIN — Medication 125 MCG/HR: at 04:04

## 2022-01-01 RX ADMIN — AMIODARONE HYDROCHLORIDE 200 MG: 200 TABLET ORAL at 08:04

## 2022-01-01 RX ADMIN — TICAGRELOR 90 MG: 90 TABLET ORAL at 10:04

## 2022-01-01 RX ADMIN — INSULIN ASPART 3 UNITS: 100 INJECTION, SOLUTION INTRAVENOUS; SUBCUTANEOUS at 04:03

## 2022-01-01 RX ADMIN — PROPOFOL 35 MCG/KG/MIN: 10 INJECTION, EMULSION INTRAVENOUS at 06:04

## 2022-01-01 RX ADMIN — DEXTROSE 250 ML: 10 SOLUTION INTRAVENOUS at 08:02

## 2022-01-01 RX ADMIN — MAGNESIUM SULFATE IN WATER 2 G: 40 INJECTION, SOLUTION INTRAVENOUS at 05:04

## 2022-01-01 RX ADMIN — APIXABAN 2.5 MG: 2.5 TABLET, FILM COATED ORAL at 09:02

## 2022-01-01 RX ADMIN — SODIUM ZIRCONIUM CYCLOSILICATE 5 G: 5 POWDER, FOR SUSPENSION ORAL at 10:03

## 2022-01-01 RX ADMIN — HEPARIN SODIUM 21 UNITS/KG/HR: 1000 INJECTION, SOLUTION INTRAVENOUS; SUBCUTANEOUS at 03:02

## 2022-01-01 RX ADMIN — ADENOSINE 6 MG: 3 INJECTION, SOLUTION INTRAVENOUS at 04:04

## 2022-01-01 RX ADMIN — SODIUM CHLORIDE: 0.9 INJECTION, SOLUTION INTRAVENOUS at 09:04

## 2022-01-01 RX ADMIN — LISINOPRIL 2.5 MG: 2.5 TABLET ORAL at 08:03

## 2022-01-01 RX ADMIN — ALBUTEROL SULFATE 10 MG: 2.5 SOLUTION RESPIRATORY (INHALATION) at 12:03

## 2022-01-01 RX ADMIN — CLOPIDOGREL 75 MG: 75 TABLET, FILM COATED ORAL at 09:02

## 2022-01-01 RX ADMIN — PROPOFOL 20 MCG/KG/MIN: 10 INJECTION, EMULSION INTRAVENOUS at 02:04

## 2022-01-01 RX ADMIN — GABAPENTIN 300 MG: 300 CAPSULE ORAL at 08:02

## 2022-01-01 RX ADMIN — POTASSIUM & SODIUM PHOSPHATES POWDER PACK 280-160-250 MG 2 PACKET: 280-160-250 PACK at 11:04

## 2022-01-01 RX ADMIN — APIXABAN 2.5 MG: 2.5 TABLET, FILM COATED ORAL at 02:02

## 2022-01-01 RX ADMIN — SODIUM CHLORIDE: 0.9 INJECTION, SOLUTION INTRAVENOUS at 10:04

## 2022-01-01 RX ADMIN — ALBUTEROL SULFATE 2 PUFF: 108 INHALANT RESPIRATORY (INHALATION) at 12:04

## 2022-01-01 RX ADMIN — INSULIN DETEMIR 2 UNITS: 100 INJECTION, SOLUTION SUBCUTANEOUS at 10:04

## 2022-01-01 RX ADMIN — INSULIN DETEMIR 2 UNITS: 100 INJECTION, SOLUTION SUBCUTANEOUS at 10:03

## 2022-01-01 RX ADMIN — METOPROLOL TARTRATE 25 MG: 25 TABLET, FILM COATED ORAL at 08:04

## 2022-01-01 RX ADMIN — SODIUM ZIRCONIUM CYCLOSILICATE 10 G: 10 POWDER, FOR SUSPENSION ORAL at 10:02

## 2022-01-01 RX ADMIN — PROPOFOL 30 MCG/KG/MIN: 10 INJECTION, EMULSION INTRAVENOUS at 01:04

## 2022-01-01 RX ADMIN — SODIUM CHLORIDE: 0.9 INJECTION, SOLUTION INTRAVENOUS at 04:04

## 2022-01-01 RX ADMIN — METOPROLOL TARTRATE 25 MG: 25 TABLET, FILM COATED ORAL at 07:04

## 2022-01-01 RX ADMIN — EPINEPHRINE 20 MCG: 0.1 INJECTION, SOLUTION ENDOTRACHEAL; INTRACARDIAC; INTRAVENOUS at 02:04

## 2022-01-01 RX ADMIN — HEPARIN SODIUM 12 UNITS/KG/HR: 1000 INJECTION, SOLUTION INTRAVENOUS; SUBCUTANEOUS at 09:02

## 2022-01-01 RX ADMIN — INSULIN ASPART 3 UNITS: 100 INJECTION, SOLUTION INTRAVENOUS; SUBCUTANEOUS at 08:03

## 2022-01-01 RX ADMIN — Medication 250 MCG/HR: at 05:04

## 2022-01-01 RX ADMIN — DEXMEDETOMIDINE HYDROCHLORIDE 0.6 MCG/KG/HR: 400 INJECTION, SOLUTION INTRAVENOUS at 05:04

## 2022-01-01 RX ADMIN — PROPOFOL 20 MCG/KG/MIN: 10 INJECTION, EMULSION INTRAVENOUS at 10:04

## 2022-01-01 RX ADMIN — CALCIUM GLUCONATE 1 G: 98 INJECTION, SOLUTION INTRAVENOUS at 03:02

## 2022-01-01 RX ADMIN — HEPARIN SODIUM 18 UNITS/KG/HR: 5000 INJECTION INTRAVENOUS; SUBCUTANEOUS at 05:04

## 2022-01-01 RX ADMIN — ATORVASTATIN CALCIUM 40 MG: 40 TABLET, FILM COATED ORAL at 09:02

## 2022-01-01 RX ADMIN — ASPIRIN 81 MG CHEWABLE TABLET 81 MG: 81 TABLET CHEWABLE at 08:03

## 2022-01-01 RX ADMIN — PHENYLEPHRINE HYDROCHLORIDE 0.7 MCG/KG/MIN: 10 INJECTION INTRAVENOUS at 09:04

## 2022-01-01 RX ADMIN — HEPARIN SODIUM 12 UNITS/KG/HR: 5000 INJECTION INTRAVENOUS; SUBCUTANEOUS at 02:04

## 2022-01-01 RX ADMIN — APIXABAN 2.5 MG: 2.5 TABLET, FILM COATED ORAL at 08:02

## 2022-01-01 RX ADMIN — IOHEXOL 100 ML: 350 INJECTION, SOLUTION INTRAVENOUS at 06:03

## 2022-01-01 RX ADMIN — GABAPENTIN 100 MG: 100 CAPSULE ORAL at 09:02

## 2022-01-01 RX ADMIN — INSULIN DETEMIR 4 UNITS: 100 INJECTION, SOLUTION SUBCUTANEOUS at 09:03

## 2022-01-01 RX ADMIN — SODIUM CHLORIDE 200 ML/HR: 0.9 INJECTION, SOLUTION INTRAVENOUS at 11:04

## 2022-01-01 RX ADMIN — INSULIN HUMAN 10 UNITS: 100 INJECTION, SOLUTION PARENTERAL at 05:02

## 2022-01-01 RX ADMIN — DEXMEDETOMIDINE HYDROCHLORIDE 0.8 MCG/KG/HR: 400 INJECTION, SOLUTION INTRAVENOUS at 06:04

## 2022-01-01 RX ADMIN — PROPOFOL 15 MCG/KG/MIN: 10 INJECTION, EMULSION INTRAVENOUS at 06:04

## 2022-01-01 RX ADMIN — PROPOFOL 35 MCG/KG/MIN: 10 INJECTION, EMULSION INTRAVENOUS at 02:04

## 2022-01-01 RX ADMIN — DEXTROSE 50 % IN WATER (D50W) INTRAVENOUS SYRINGE 25 G: at 01:02

## 2022-01-01 RX ADMIN — EPINEPHRINE 1 MG: 0.1 INJECTION, SOLUTION ENDOTRACHEAL; INTRACARDIAC; INTRAVENOUS at 04:04

## 2022-01-01 RX ADMIN — METOPROLOL TARTRATE 25 MG: 25 TABLET, FILM COATED ORAL at 11:03

## 2022-01-01 RX ADMIN — DIPHENHYDRAMINE HYDROCHLORIDE 50 MG: 50 CAPSULE ORAL at 09:02

## 2022-01-01 RX ADMIN — NICARDIPINE HYDROCHLORIDE 2.5 MG/HR: 0.2 INJECTION INTRAVENOUS at 03:04

## 2022-01-01 RX ADMIN — SODIUM BICARBONATE 50 MEQ: 84 INJECTION, SOLUTION INTRAVENOUS at 01:03

## 2022-01-01 RX ADMIN — PIPERACILLIN AND TAZOBACTAM 4.5 G: 4; .5 INJECTION, POWDER, LYOPHILIZED, FOR SOLUTION INTRAVENOUS; PARENTERAL at 10:04

## 2022-01-01 RX ADMIN — Medication 100 MCG: at 08:04

## 2022-01-01 RX ADMIN — PROPOFOL 25 MCG/KG/MIN: 10 INJECTION, EMULSION INTRAVENOUS at 10:04

## 2022-01-01 RX ADMIN — CLOPIDOGREL 75 MG: 75 TABLET, FILM COATED ORAL at 02:02

## 2022-01-01 RX ADMIN — PROPOFOL 20 MCG/KG/MIN: 10 INJECTION, EMULSION INTRAVENOUS at 12:04

## 2022-01-01 RX ADMIN — PIPERACILLIN SODIUM AND TAZOBACTAM SODIUM 4.5 G: 4; .5 INJECTION, POWDER, FOR SOLUTION INTRAVENOUS at 09:04

## 2022-01-01 RX ADMIN — HEPARIN SODIUM 40 UNITS/KG/HR: 1000 INJECTION, SOLUTION INTRAVENOUS; SUBCUTANEOUS at 04:02

## 2022-01-01 RX ADMIN — HEPARIN SODIUM 22 UNITS/KG/HR: 5000 INJECTION INTRAVENOUS; SUBCUTANEOUS at 11:04

## 2022-01-01 RX ADMIN — VANCOMYCIN HYDROCHLORIDE 1250 MG: 1.25 INJECTION, POWDER, LYOPHILIZED, FOR SOLUTION INTRAVENOUS at 08:04

## 2022-01-05 NOTE — TELEPHONE ENCOUNTER
----- Message from Celia Castro sent at 1/4/2022  3:44 PM CST -----  Contact: Kylie   Kylie would like a call back to [facility] for a virtual visit. It is scheduled this Friday

## 2022-01-05 NOTE — TELEPHONE ENCOUNTER
Spoke to pt and she is asking if her appt for 01/07/2022 can be changed to virtual. Or please advise if this appt is needed. Pt states she is fine and doesn't need to keep the appt unless  states she needs to see her.

## 2022-01-11 NOTE — PROGRESS NOTES
Subjective:    Patient ID:  Kylie King is a 66 y.o. female who presents for evaluation of Atrial Fibrillation      66 yoF HTN, pAF, ESRD here for AF management. She had a mechanical fall 11/21 leading to admission. She was in AF/RVR. She was cardioverted and placed on amio and eliquis. She has had prior AF episodes. She also had a stroke 12/12 with associated ICH. 9/2/2020 ECG normal, 10/9/2020 AF. All subsequent ECGS until 11/21 showed AF. HD started 4/2018 via RUE fistula. Prior L mastectomy. She stopped amiodarone on her own due to side effects. She had some difficulty with HD while in AF.  She is on plavix for CAD/cerebrovascular disease. On eliquis she suffers from excessive bleeding via her fistula.     CHADSVASC of 7  HASBLED of 5    Echo 11/21:  · The left ventricle is normal in size with mild eccentric hypertrophy and normal systolic function.  · Atrial fibrillation observed.  · The estimated ejection fraction is 60%.  · Normal right ventricular size with normal right ventricular systolic function.  · Severe left atrial enlargement.  · Mild tricuspid regurgitation.  · There is mild-to-moderate aortic valve stenosis.  · Aortic valve area is 1.22 cm2; peak velocity is 2.00 m/s; mean gradient is 8 mmHg.  · Intermediate central venous pressure (8 mmHg).  · The estimated PA systolic pressure is 38 mmHg.    Past Medical History:  No date: Anxiety  No date: Breast cancer      Comment:  left  No date: Carotid artery disease  No date: Cataract  No date: Choledocholithiasis      Comment:  s/p ERCP and stent placement  No date: Chronic diastolic CHF (congestive heart failure)  No date: Chronic obstructive pulmonary disease  No date: Chronic venous insufficiency  No date: Depression  No date: End-stage renal disease on hemodialysis      Comment:  M/W/F  No date: GERD (gastroesophageal reflux disease)  No date: History of breast cancer  2001: History of colon cancer      Comment:  s/p sigmoid colectomy  No date:  History of kidney stones  No date: History of osteomyelitis of left foot  No date: History of pancreatitis  No date: History of stroke  No date: Hyperlipidemia  No date: Hypertension  No date: Intracerebral hemorrhage  No date: Lumbar degenerative disc disease  No date: Lumbar spinal stenosis  No date: Morbid obesity  No date: Paroxysmal atrial fibrillation  No date: Peripheral artery disease  No date: Peripheral neuropathy  No date: Tobacco dependence  No date: Type II diabetes mellitus  No date: Urinary incontinence    Past Surgical History:  9/17/2020: ANGIOGRAPHY OF LOWER EXTREMITY; Right      Comment:  Procedure: Angiogram Extremity Unilateral;  Surgeon: RICK Pollard III, MD;  [facility];                 Service: Peripheral Vascular;  Laterality: Right;  6.2                minutes of yxifa402.88 bNr097.64 Yndi599 ml  5/13/2021: ANGIOGRAPHY OF LOWER EXTREMITY; Left      Comment:  Procedure: Angiogram Extremity Unilateral;  Surgeon: HOMERO Hayden II, MD;  [facility];                 Service: Vascular;  Laterality: Left;  35.1 bbr988.41                mPk953.27 Gy.zb042ns Dye  7/19/2021: ANGIOGRAPHY OF LOWER EXTREMITY; Right      Comment:  Procedure: Angiogram Extremity Unilateral;  Surgeon: HOMERO Hayden II, MD;  [facility];                 Service: Vascular;  Laterality: Right;  3.0 aft588.00                mGy26.8713 Gy.cm13ml Dye  5/13/2021: AORTOGRAPHY; N/A      Comment:  Procedure: AORTOGRAM;  Surgeon: HOMERO Hayden II, MD;  [facility];  Service: Vascular;                 Laterality: N/A;  5/28/2018: AV FISTULA PLACEMENT; Right      Comment:  Procedure: CREATION -FISTULA-AV;  Surgeon: RICK Pollard III, MD;  [facility];  Service:               Peripheral Vascular;  Laterality: Right;  1/2012: BREAST BIOPSY      Comment:  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) and                intraductal papilloma (medial bx)  11/22/2021: CARDIOVERSION  11/22/2021: CARDIOVERSION      Comment:  Procedure: Cardioversion;  Surgeon: Ad Garcia MD;                 Location: [facility] CATH LAB;  Service: Cardiology;;  1/24/2019: CATARACT EXTRACTION W/  INTRAOCULAR LENS IMPLANT; Right      Comment:    1/31/2019: CATARACT EXTRACTION W/  INTRAOCULAR LENS IMPLANT; Left      Comment:  Procedure: EXTRACTION, CATARACT, WITH IOL INSERTION;                 Surgeon: Immanuel Moncada MD;  [facility];  Service:               Ophthalmology;  Laterality: Left;  2001: CHOLECYSTECTOMY  2/17/2021: DEBRIDEMENT OF FOOT; Right      Comment:  Procedure: DEBRIDEMENT, FOOT right plantar ulcer;                 Surgeon: Sonja Corral DPM;  Location: [facility] OR;  Service:                Podiatry;  Laterality: Right;  No date: ERCP W/ SPHICTEROTOMY  1999: FINE NEEDLE ASPIRATION      Comment:  Right breast  6/1/2021: FOOT AMPUTATION; Left      Comment:  Procedure: Transmetatarsal amputation;  Surgeon: Billy Robles DPM;  [facility];  Service:                Podiatry;  Laterality: Left;  7/23/2021: FOOT AMPUTATION; Left      Comment:  Procedure: AMPUTATION, FOOT;  Surgeon: Billy Robles DPM;  [facility];  Service: Podiatry;                 Laterality: Left;  10/15/2020: FOOT AMPUTATION THROUGH METATARSAL; Right      Comment:  Procedure: PARTIAL RAY AMPUTATION GREAT TOE;  Surgeon:                Billy Robles DPM;  Location: [facility];                 Service: Podiatry;  Laterality: Right;  Stretcher OK  No date: HERNIA REPAIR  2011: LAPAROSCOPIC NEPHRECTOMY; Left  2013: MASTECTOMY      Comment:  left  2001: OOPHORECTOMY; Left  8/15/2018: REVISION OF ARTERIOVENOUS FISTULA; Right      Comment:  Procedure: REVISION, AV FISTULA;  Surgeon: RICK Pollard III, MD;  [facility];   Service:               Peripheral Vascular;  Laterality: Right;  2001: SIGMOIDECTOMY  2021: SURGICAL REMOVAL OF METATARSAL HEAD; Right      Comment:  Procedure: OSTECTOMY, METATARSAL BONE, diatal 1st;                 Surgeon: Sonja Corral DPM;  Location: [facility] OR;  Service:                Podiatry;  Laterality: Right;  2021: TOE AMPUTATION; Left      Comment:  Procedure: AMPUTATION, TOE;  Surgeon: HOMERO Hayden II, MD;  Location: [facility] OR [address] [place] [address];  Service: Vascular;                 Laterality: Left;  2013: TOTAL REDUCTION MAMMOPLASTY; Right    Social History    Socioeconomic History      Marital status: Single      Number of children: 2    Occupational History      Occupation: not working        Employer: [email or web address]    Tobacco Use      Smoking status: Current Some Day Smoker        Packs/day: 0.50        Years: 28.00        Pack years: 14        Types: Cigarettes        Start date: 1990        Quit date: 2018        Years since quittin.0      Smokeless tobacco: Never Used      Tobacco comment: anxious    Substance and Sexual Activity      Alcohol use: No      Drug use: No      Sexual activity: Not Currently        Partners: Male    Social History Narrative      She is not exercising regularly    Social Determinants of Health  Financial Resource Strain: Low Risk       Difficulty of Paying Living Expenses: Not hard at all  Food Insecurity: No Food Insecurity      Worried About Running Out of Food in the Last Year: Never true      Ran Out of Food in the Last Year: Never true  Transportation Needs: No Transportation Needs      Lack of Transportation (Medical): No      Lack of Transportation (Non-Medical): No  Physical Activity: Insufficiently Active      Days of Exercise per Week: 3 days      Minutes of Exercise per Session: 20 min  Stress: No Stress Concern Present      Feeling of Stress : Only a little  Social Connections: Moderately Isolated      Frequency of Communication  with Friends and Family: More than three times a week      Frequency of Social Gatherings with Friends and Family: Twice a week      Attends Yazidi Services: Never      Active Member of Clubs or Organizations: No      Attends Club or Organization Meetings: Never      Marital Status:   Housing Stability: Low Risk       Unable to Pay for Housing in the Last Year: No      Number of Places Lived in the Last Year: 1      Unstable Housing in the Last Year: No    Review of patient's family history indicates:  Problem: Arthritis      Relation: Mother          Age of Onset: (Not Specified)  Problem: Heart disease      Relation: Mother          Age of Onset: (Not Specified)  Problem: Diabetes      Relation: Father          Age of Onset: (Not Specified)  Problem: Heart disease      Relation: Father          Age of Onset: (Not Specified)  Problem: Celiac disease      Relation: Sister          Age of Onset: (Not Specified)  Problem: Breast cancer      Relation: Maternal Grandmother          Age of Onset: (Not Specified)          Comment: possible-  patient unsure  Problem: Cancer      Relation: Maternal Grandmother          Age of Onset: (Not Specified)  Problem: Heart disease      Relation: Maternal Grandmother          Age of Onset: (Not Specified)  Problem: Cancer      Relation: Maternal Aunt          Age of Onset: (Not Specified)  Problem: Ovarian cancer      Relation: Neg Hx          Age of Onset: (Not Specified)  Problem: Glaucoma      Relation: Neg Hx          Age of Onset: (Not Specified)  Problem: Macular degeneration      Relation: Neg Hx          Age of Onset: (Not Specified)      Current Outpatient Medications:  albuterol (PROVENTIL/VENTOLIN HFA) 90 mcg/actuation inhaler, Inhale 2 puffs into the lungs every 6 (six) hours as needed for Wheezing. Rescue, Disp: 18 g, Rfl: 3  amiodarone (PACERONE) 200 MG Tab, Take 1 tablet (200 mg total) by mouth once daily., Disp: 90 tablet, Rfl: 3  amLODIPine (NORVASC) 10 MG  tablet, Take 1 tablet (10 mg total) by mouth once daily., Disp: 90 tablet, Rfl: 3  atorvastatin (LIPITOR) 40 MG tablet, Take 1 tablet (40 mg total) by mouth once daily., Disp: 90 tablet, Rfl: 3  benzonatate (TESSALON) 100 MG capsule, Take 1 capsule (100 mg total) by mouth 3 (three) times daily as needed for Cough., Disp: , Rfl:   blood sugar diagnostic Strp, 1 strip by Misc.(Non-Drug; Combo Route) route 4 (four) times daily., Disp: 150 strip, Rfl: 3  blood-glucose sensor (DEXCOM G6 SENSOR) Umm, 1 each by Misc.(Non-Drug; Combo Route) route every 10 days., Disp: 3 each, Rfl: 11  blood-glucose transmitter (DEXCOM G6 TRANSMITTER) Umm, 1 Device by Misc.(Non-Drug; Combo Route) route continuous., Disp: 1 Device, Rfl: 3  carvediloL (COREG) 12.5 MG tablet, Take 1 tablet (12.5 mg total) by mouth 2 (two) times daily with meals., Disp: 180 tablet, Rfl: 3  citalopram (CELEXA) 20 MG tablet, Take 1 tablet (20 mg total) by mouth nightly., Disp: 90 tablet, Rfl: 3  clopidogreL (PLAVIX) 75 mg tablet, Take 1 tablet (75 mg total) by mouth once daily., Disp: 60 tablet, Rfl: 0  famotidine (PEPCID) 20 MG tablet, Take 1 tablet (20 mg total) by mouth nightly as needed., Disp: 90 tablet, Rfl: 3  fish oil-omega-3 fatty acids 300-1,000 mg capsule, Take 1 capsule by mouth every morning., Disp: , Rfl:   flash glucose scanning reader (FREESTYLE LULU 14 DAY READER) Misc, Use as directed to check blood sugars, Disp: 2 each, Rfl: 11  flash glucose scanning reader (FREESTYLE LULU 2 READER) Misc, 1 Units by Misc.(Non-Drug; Combo Route) route continuous prn., Disp: 1 each, Rfl: 0  flash glucose sensor (FREESTYLE LULU 14 DAY SENSOR) Kit, Use as directed to check blood sugars, Disp: 2 kit, Rfl: 11  flash glucose sensor (FREESTYLE LULU 2 SENSOR) Kit, 1 Units by Misc.(Non-Drug; Combo Route) route every 14 (fourteen) days., Disp: 2 kit, Rfl: 11  gabapentin (NEURONTIN) 300 MG capsule, Take 1 capsule (300 mg total) by mouth every evening., Disp: 60  capsule, Rfl: 0  guaiFENesin (MUCINEX) 600 mg 12 hr tablet, Take 1 tablet (600 mg total) by mouth 2 (two) times daily., Disp: , Rfl:   hydrALAZINE (APRESOLINE) 50 MG tablet, Take 1 tablet (50 mg total) by mouth every 8 (eight) hours., Disp: 90 tablet, Rfl: 11  insulin aspart U-100 (NOVOLOG) 100 unit/mL (3 mL) InPn pen, Inject 3 Units into the skin 3 (three) times daily with meals., Disp: 5.4 mL, Rfl: 0  insulin detemir U-100 (LEVEMIR FLEXTOUCH) 100 unit/mL (3 mL) SubQ InPn pen, Inject 4 Units into the skin 2 (two) times daily., Disp: 4.8 mL, Rfl: 0  lancets (ONETOUCH DELICA LANCETS) 33 gauge Misc, 1 lancet by Misc.(Non-Drug; Combo Route) route 4 (four) times daily before meals and nightly., Disp: 400 each, Rfl: 4  letrozole (FEMARA) 2.5 mg Tab, Take 1 tablet (2.5 mg total) by mouth nightly., Disp: 90 tablet, Rfl: 3  senna-docusate 8.6-50 mg (PERICOLACE) 8.6-50 mg per tablet, Take 1 tablet by mouth 2 (two) times daily. (Patient not taking: Reported on 12/14/2021), Disp: , Rfl:   sevelamer carbonate (RENVELA) 800 mg Tab, Take 2 tablets (1,600 mg total) by mouth 3 (three) times daily with meals., Disp: 180 tablet, Rfl: 11    No current facility-administered medications for this visit.          Review of Systems   Constitutional: Negative.   HENT: Negative.    Eyes: Negative.    Cardiovascular: Negative for chest pain, dyspnea on exertion, leg swelling, near-syncope, palpitations and syncope.   Respiratory: Negative.  Negative for shortness of breath.    Endocrine: Negative.    Hematologic/Lymphatic: Negative.    Skin: Negative.    Musculoskeletal: Negative.    Gastrointestinal: Negative.    Genitourinary: Negative.    Neurological: Negative.  Negative for dizziness and light-headedness.   Psychiatric/Behavioral: Negative.    Allergic/Immunologic: Negative.         Objective:    Physical Exam  Constitutional:       General: She is not in acute distress.     Appearance: Normal appearance. She is well-developed. She is  ill-appearing. She is not toxic-appearing or diaphoretic.   HENT:      Head: Normocephalic and atraumatic.      Nose: Nose normal.   Eyes:      General:         Right eye: No discharge.         Left eye: No discharge.      Conjunctiva/sclera:      Right eye: Right conjunctiva is not injected.      Left eye: Left conjunctiva is not injected.      Pupils: Pupils are equal.      Right eye: Pupil is round.      Left eye: Pupil is round.   Neck:      Thyroid: No thyromegaly.      Vascular: No carotid bruit or JVD.   Cardiovascular:      Rate and Rhythm: Normal rate and regular rhythm.  No extrasystoles are present.     Chest Wall: PMI is not displaced.      Pulses:           Radial pulses are 2+ on the right side and 2+ on the left side.        Femoral pulses are 2+ on the right side and 2+ on the left side.     Heart sounds: S1 normal and S2 normal. Murmur heard.    Midsystolic murmur is present with a grade of 2/6 at the upper right sternal border.  Gallop present. S4 sounds present.    Pulmonary:      Effort: Pulmonary effort is normal.      Breath sounds: Normal breath sounds.   Abdominal:      Palpations: Abdomen is soft.      Tenderness: There is no abdominal tenderness.   Musculoskeletal:      Cervical back: Neck supple.      Right ankle: No swelling, deformity or ecchymosis.      Left ankle: No swelling, deformity or ecchymosis.   Lymphadenopathy:      Head:      Right side of head: No submandibular adenopathy.      Left side of head: No submandibular adenopathy.      Cervical: No cervical adenopathy.   Skin:     General: Skin is warm and dry.      Findings: No erythema.      Nails: There is no clubbing.   Neurological:      Mental Status: She is alert and oriented to person, place, and time. She is not disoriented.      Cranial Nerves: No cranial nerve deficit.   Psychiatric:         Speech: Speech normal.         Behavior: Behavior normal.         Thought Content: Thought content normal.         Judgment:  Judgment normal.     \    ECG: NSR  ms, nl QRS        Assessment:       1. Primary hypertension    2. Atrial fibrillation, persistent   3. End-stage renal disease on hemodialysis         Plan:       66 yoF referred for AF management. I discussed AF and its basic pathophysiology, including its health implications and treatment options with the patient. Specifically, I addressed the need for CVA prophylaxis as well as the goal to reduce symptomatic arrhythmic episodes by pharmacologic and/or procedural methods. She has persistent, symptomatic AF with side effects to amiodarone and inability to take other AADs. She also has prior ICH (2012) and frequent bleeding issues on eliquis during HD sessions. I offered PVI followed by odalis.     I had extensive discussion with patient regarding risks and benefits of PVI/WACA, and the patient would like to proceed. Risks of procedure include (but are not limited to) bleeding, stroke, perforation requiring emergency draining or surgery, AV block, death, AV fistula, AE fistula, PV stenosis.    Last anti-coagulation dose night prior to procedure.  Discontinue antiarrhytmic drugs 4 days prior to the procedure.     RF PVI  Anesthesia  NOAH prior, cancel if NSR  VICENTA    6 weeks later:  The patient can tolerate short term OAC but is not a candidate for long term OAC due to recurrent bleeding issues. I discussed management options regarding LAAO. I discussed the process of LAAO via Watchman including pre-procedure testing  as well as the need to take OAC for 6 weeks post implant. We discussed post procedure NOAH studies to assess VIRAJ occlusion. Additionally I mentioned the need to take DAPT up to the 6 month point post implant.      Watchman with Anesthesia support

## 2022-01-21 NOTE — TELEPHONE ENCOUNTER
----- Message from Asya Vaughan sent at 1/21/2022  9:02 AM CST -----  Contact: [facility]/Dionne [phone]  PT is going out of town and does not want anyone else. PT will resume in 1 week.    Thank you

## 2022-01-31 NOTE — TELEPHONE ENCOUNTER
Last filled:06/25/2020  LOV:12/01/2021  RTC:03/15/2022    And refill on :  acetaminophen-codeine 300-30mg (TYLENOL #3) 300-30 mg Tab 30 tablet 0 12/28/2021 1/7/2022 No   Sig - Route: Take 1 tablet by mouth every 8 (eight) hours as needed. - Oral   Sent to pharmacy as: acetaminophen-codeine 300-30mg (TYLENOL #3) 300-30 mg Tab

## 2022-01-31 NOTE — TELEPHONE ENCOUNTER
Pt  nurse requesting a verbal to extend the patient physical therapy for another 4 weeks she stated the patient missed a lot of therapy due to having diarrhea.

## 2022-01-31 NOTE — TELEPHONE ENCOUNTER
Pt requesting refill on:  acetaminophen-codeine 300-30mg (TYLENOL #3) 300-30 mg Tab 30 tablet 0 12/28/2021 1/7/2022 No   Sig - Route: Take 1 tablet by mouth every 8 (eight) hours as needed. - Oral   Sent to pharmacy as: acetaminophen-codeine 300-30mg (TYLENOL #3) 300-30 mg Tab

## 2022-01-31 NOTE — TELEPHONE ENCOUNTER
----- Message from Yue Willard sent at 1/31/2022  4:10 PM CST -----  Contact: [phone]  Dionne PT with [facility] would like to get a verbal to extend the patient physical therapy for another 4 weeks she stated the patient missed a lot of therapy due to having diarrhea.Please advise

## 2022-02-02 NOTE — TELEPHONE ENCOUNTER
----- Message from Mandy Alexis sent at 2/2/2022  1:54 PM CST -----  Contact: [facility]/PT/Dionne/[phone]  Dionne said that she is calling in regards to needing to check the status of an order asking to extend Physical Therapy  for pt for an additional 4 weeks due to pt missing visits due to illness. Please advise

## 2022-02-02 NOTE — TELEPHONE ENCOUNTER
Pt  nurse requesting a verbal to extend the patient physical therapy for another 4 weeks she stated the patient missed a lot of therapy due to having diarrhea.  &  Pt requesting refill on:  acetaminophen-codeine 300-30mg (TYLENOL #3) 300-30 mg Tab 30 tablet 0 12/28/2021 1/7/2022 No   Sig - Route: Take 1 tablet by mouth every 8 (eight) hours as needed. - Oral   Sent to pharmacy as: acetaminophen-codeine 300-30mg (TYLENOL #3) 300-30 mg Tab

## 2022-02-04 PROBLEM — J96.20 ACUTE ON CHRONIC RESPIRATORY FAILURE: Status: ACTIVE | Noted: 2021-01-29

## 2022-02-04 PROBLEM — I21.A1 TYPE 2 MYOCARDIAL INFARCTION: Status: ACTIVE | Noted: 2022-01-01

## 2022-02-04 NOTE — HPI
Kylie King is a 66 year old female with chronic respiratory failure (on home 2L of O2), COPD, ESRD on HD TTS w/ anemia of chronic disease, Atrial Fib (failed DCCV in Nov 2021); on eliquis and amiodarone, CVA, PVD (s/p left foot amputation on Plavix), HTN, T2DM, HLD, who presented to the emergency department for dyspnea. Patient reports she has been out of her oxygen for the past few days. She reports dyspnea started a few days ago but worsened overnight. She last had HD on Tuesday and missed her Thursday session due to feeling unwell. She denies chest pain, LE edema. She is anuric. She reports she is scheduled to undergo an RFA for her atrial fibrillation on Monday morning and was told to hold her amiodarone, although she isn't clear as to whether she has been taking the medication. She also reports taking eliquis twice daily. Of note, the patient has had multiple hospital visits (4 in the past six months). She has a history of falls for which eliquis was held, however she recently was seen by EP- Dr. Kuo who recommended she continue her eliquis  but hold it the night prior to RF-PVI and possible VIRAJ closure device.    Upon arrival to the ED, she was hemodynamically stable, placed on 2L of O2. Labs revealed K of 6.3, BNP 2004, Troponin 3.14. CXR revealed pulmonary edema and possible left sided opacification. ECG revealed non specific ST-T wave changes. Cardiology was consulted. Patient was shifted and received calcium for cardioprotection. Patient was admitted into Hospital medicine for further management.

## 2022-02-04 NOTE — ASSESSMENT & PLAN NOTE
ECG with non-specific ST-T changes, troponin elevated at 3. BNP elevated in 2000s  Type 2 NSTEMI in the setting of volume overload due to missed HD session.     Plan  - Cardiology consulted in the ED and not concerned for ACS  - Plan for HD tonight.  - Trend troponin until peak.

## 2022-02-04 NOTE — Clinical Note
The ECG showed sinus bradycardia.  [FreeTextEntry1] : PFT- spi reveals normal flows; FEV1 was 3.20L which is 93% of predicted; normal flow volume loop \par \par FENO was 14; a normal value being less than 25\par Fractional exhaled nitric oxide (FENO) is regarded as a simple, noninvasive method for assessing eosinophilic airway inflammation. Produced by a variety of cells within the lung, nitric oxide (NO) concentrations are generally low in healthy individuals. However, high concentrations of NO appear to be involved in nonspecific host defense mechanisms and chronic inflammatory diseases such as asthma. The American Thoracic Society (ATS) therefore has recommended using FENO to aid in the diagnosis and monitoring of eosinophilic airway inflammation and asthma, and for identifying steroid responsive individuals whose chronic respiratory symptoms may be caused by airway inflammation. \par \par \par

## 2022-02-04 NOTE — ASSESSMENT & PLAN NOTE
Troponin elevation secondary to oxygen supply-demand mismatch in the setting of acutely decompensated HF due to missing HD. ECG with NSR, nonspecific ST-T changes. No evidence of ACS.  - Recommend not starting ACS protocol  - Hemodialysis as soon as possible (Nephrology on board)

## 2022-02-04 NOTE — ASSESSMENT & PLAN NOTE
On home HD TTh S. Missed HD the day prior to hospital admission.     - Nephrology consulted. Plan for HD tonight and tomorrow  - Daily RFP  - Avoid nephrotoxins; renally dose meds  - Renal diet.

## 2022-02-04 NOTE — ASSESSMENT & PLAN NOTE
In the setting of hypervolemia due to missed HD session vs decompensated HF.   On home 2L of O2.    - Plan for HD tonight  - Wean O2 to maintain sats 88-92% given hx of COPD

## 2022-02-04 NOTE — ED TRIAGE NOTES
Pt reports SOB with no relief from inhaler. Pt wears 2L oxygen at home as needed and states she has been out of her oxygen. Pt does dialysis Tues, Thurs Sat. Last dialysis was on Tuesday. Pt is alert and oriented x4, VSS, NAD. Pt O2 92% on room air. Pt currently on 2L BNC with O2 sat of 96%.

## 2022-02-04 NOTE — ED PROVIDER NOTES
Encounter Date: 2/4/2022       History     Chief Complaint   Patient presents with    Shortness of Breath     Hx of COPD     66-year-old female with multiple medical comorbidities - see extensive list below presents to the ED with a chief complaint of shortness of breath.  Patient reports that she began to feel bad yesterday with shortness of breath.  She reports that the shortness of breath worsened overnight.  Patient occasionally uses 2 L of O2, but states that she is out of her oxygen.  She denies fever, chills.  Patient also complains of a right-sided flank pain that she describes as a pulled muscle over the past few days.  Patient does not urinate.  History of ESRD, dialyzes T, Th, Sat.  Miss dialysis yesterday because she was feeling unwell.  Denies any chest discomfort.  She had an episode of lightheadedness and nausea today but this has resolved.        Review of patient's allergies indicates:   Allergen Reactions    Cyclobenzaprine Hallucinations    Erythromycin      Stomach upset    Keflex [cephalexin]      Yeast infection    Erythromycin base      Other reaction(s): upset stomach     Past Medical History:   Diagnosis Date    Anxiety     Breast cancer     left    Carotid artery disease     Cataract     Choledocholithiasis     s/p ERCP and stent placement    Chronic diastolic CHF (congestive heart failure)     Chronic obstructive pulmonary disease     Chronic venous insufficiency     Depression     End-stage renal disease on hemodialysis     M/W/F    GERD (gastroesophageal reflux disease)     History of breast cancer     History of colon cancer 2001    s/p sigmoid colectomy    History of kidney stones     History of osteomyelitis of left foot     History of pancreatitis     History of stroke     Hyperlipidemia     Hypertension     Intracerebral hemorrhage     Lumbar degenerative disc disease     Lumbar spinal stenosis     Morbid obesity     Paroxysmal atrial fibrillation      Peripheral artery disease     Peripheral neuropathy     Tobacco dependence     Type II diabetes mellitus     Urinary incontinence      Past Surgical History:   Procedure Laterality Date    ANGIOGRAPHY OF LOWER EXTREMITY Right 9/17/2020    Procedure: Angiogram Extremity Unilateral;  Surgeon: RICK Pollard III, MD;  Location: [address];  Service: Peripheral Vascular;  Laterality: Right;  6.2 minutes of fluro  690.88 mGy  112.64 Gycm2  55 ml    ANGIOGRAPHY OF LOWER EXTREMITY Left 5/13/2021    Procedure: Angiogram Extremity Unilateral;  Surgeon: HOMERO Hayden II, MD;  Location: [address];  Service: Vascular;  Laterality: Left;  35.1 min  971.41 mGy  190.27 Gy.cm  101ml Dye    ANGIOGRAPHY OF LOWER EXTREMITY Right 7/19/2021    Procedure: Angiogram Extremity Unilateral;  Surgeon: HOMERO Hayden II, MD;  Location: [address];  Service: Vascular;  Laterality: Right;  3.0 min  121.00 mGy  26.8713 Gy.cm  13ml Dye    AORTOGRAPHY N/A 5/13/2021    Procedure: AORTOGRAM;  Surgeon: HOMERO Hayden II, MD;  Location: [address];  Service: Vascular;  Laterality: N/A;    AV FISTULA PLACEMENT Right 5/28/2018    Procedure: CREATION -FISTULA-AV;  Surgeon: RICK Pollard III, MD;  Location: [address];  Service: Peripheral Vascular;  Laterality: Right;    BREAST BIOPSY  1/2012    left breast invasive mammary carcinoma (lateral bx) and intraductal papilloma (medial bx)    CARDIOVERSION  11/22/2021    CARDIOVERSION  11/22/2021    Procedure: Cardioversion;  Surgeon: Ad Garcia MD;  Location: [facility] CATH LAB;  Service: Cardiology;;    CATARACT EXTRACTION W/  INTRAOCULAR LENS IMPLANT Right 1/24/2019        CATARACT EXTRACTION W/  INTRAOCULAR LENS IMPLANT Left 1/31/2019    Procedure: EXTRACTION, CATARACT, WITH IOL INSERTION;  Surgeon: Immanuel Moncada MD;  Location: [facility] OR;  Service: Ophthalmology;  Laterality: Left;    CHOLECYSTECTOMY  2001    DEBRIDEMENT OF FOOT Right 2/17/2021     Procedure: DEBRIDEMENT, FOOT right plantar ulcer;  Surgeon: Sonja Corral DPM;  Location: [facility] OR;  Service: Podiatry;  Laterality: Right;    ERCP W/ SPHICTEROTOMY      FINE NEEDLE ASPIRATION  1999    Right breast    FOOT AMPUTATION Left 6/1/2021    Procedure: Transmetatarsal amputation;  Surgeon: Billy Robles DPM;  Location: [facility] OR 2ND FLR;  Service: Podiatry;  Laterality: Left;    FOOT AMPUTATION Left 7/23/2021    Procedure: AMPUTATION, FOOT;  Surgeon: Billy Robles DPM;  [facility];  Service: Podiatry;  Laterality: Left;    FOOT AMPUTATION THROUGH METATARSAL Right 10/15/2020    Procedure: PARTIAL RAY AMPUTATION GREAT TOE;  Surgeon: Billy Robles DPM;  [facility];  Service: Podiatry;  Laterality: Right;  Stretcher OK    HERNIA REPAIR      LAPAROSCOPIC NEPHRECTOMY Left 2011    MASTECTOMY  2013    left    OOPHORECTOMY Left 2001    REVISION OF ARTERIOVENOUS FISTULA Right 8/15/2018    Procedure: REVISION, AV FISTULA;  Surgeon: RICK Pollard III, MD;  [facility];  Service: Peripheral Vascular;  Laterality: Right;    SIGMOIDECTOMY  2001    SURGICAL REMOVAL OF METATARSAL HEAD Right 2/17/2021    Procedure: OSTECTOMY, METATARSAL BONE, diatal 1st;  Surgeon: Sonja Corral DPM;  Location: [facility] OR;  Service: Podiatry;  Laterality: Right;    TOE AMPUTATION Left 5/13/2021    Procedure: AMPUTATION, TOE;  Surgeon: HOMERO Hayden II, MD;  [facility];  Service: Vascular;  Laterality: Left;    TOTAL REDUCTION MAMMOPLASTY Right 2013     Family History   Problem Relation Age of Onset    Arthritis Mother     Heart disease Mother     Diabetes Father     Heart disease Father     Celiac disease Sister     Breast cancer Maternal Grandmother         possible-  patient unsure    Cancer Maternal Grandmother     Heart disease Maternal Grandmother     Cancer Maternal Aunt     Ovarian cancer Neg Hx     Glaucoma Neg Hx     Macular degeneration Neg  Hx      Social History     Tobacco Use    Smoking status: Current Some Day Smoker     Packs/day: 0.50     Years: 28.00     Pack years: 14.00     Types: Cigarettes     Start date: 1990     Last attempt to quit: 2018     Years since quittin.0    Smokeless tobacco: Never Used    Tobacco comment: anxious   Substance Use Topics    Alcohol use: No    Drug use: No     Review of Systems   Constitutional: Positive for fatigue. Negative for fever.   HENT: Negative for sore throat.    Respiratory: Positive for cough and shortness of breath.    Cardiovascular: Negative for chest pain and leg swelling.   Gastrointestinal: Positive for nausea.   Genitourinary: Positive for flank pain. Negative for dysuria.   Musculoskeletal: Negative for back pain.   Skin: Negative for rash.   Neurological: Positive for light-headedness. Negative for weakness.   Hematological: Does not bruise/bleed easily.       Physical Exam     Initial Vitals [22]   BP Pulse Resp Temp SpO2   (!) 146/70 104 (!) 24 98.4 °F (36.9 °C) 95 %      MAP       --         Physical Exam    Nursing note and vitals reviewed.  Constitutional: She appears well-developed and well-nourished. She is not diaphoretic.  Non-toxic appearance. She does not appear ill. No distress.   Chronically ill-appearing   HENT:   Head: Normocephalic and atraumatic.   Neck: Neck supple.   Cardiovascular: Normal rate and regular rhythm. Exam reveals no gallop and no friction rub.    No murmur heard.  AV fistula to right upper extremity.   Pulmonary/Chest: Effort normal. No accessory muscle usage. Tachypnea noted. No respiratory distress. She has decreased breath sounds in the right lower field. She has no wheezes. She has no rhonchi. She has rales in the right lower field.   Abdominal: Abdomen is soft. She exhibits no distension. There is no abdominal tenderness. There is no guarding.   Musculoskeletal:      Cervical back: Neck supple.      Right lower le+ Edema  present.      Left lower le+ Edema present.      Comments: Partial foot amputation on the left.  Warm extremities.      Neurological: She is alert.   Skin: No rash noted.   Psychiatric: She has a normal mood and affect. Her behavior is normal.         ED Course   Critical Care    Date/Time: 2022 5:07 PM  Performed by: Rebecca Her PA-C  Authorized by: Leonard Li MD   Direct patient critical care time: 10 minutes  Additional history critical care time: 5 minutes  Ordering / reviewing critical care time: 7 minutes  Documentation critical care time: 7 minutes  Consulting other physicians critical care time: 7 minutes  Total critical care time (exclusive of procedural time) : 36 minutes  Critical care was necessary to treat or prevent imminent or life-threatening deterioration of the following conditions: cardiac failure and metabolic crisis.  Critical care was time spent personally by me on the following activities: discussions with consultants, examination of patient, obtaining history from patient or surrogate, ordering and performing treatments and interventions, ordering and review of laboratory studies, ordering and review of radiographic studies, pulse oximetry, review of old charts and re-evaluation of patient's condition.        Labs Reviewed   CBC W/ AUTO DIFFERENTIAL - Abnormal; Notable for the following components:       Result Value    RBC 3.49 (*)     Hemoglobin 9.8 (*)     Hematocrit 32.3 (*)     MCHC 30.3 (*)     RDW 19.5 (*)     Lymph # 0.6 (*)     Gran % 87.6 (*)     Lymph % 7.6 (*)     All other components within normal limits   COMPREHENSIVE METABOLIC PANEL - Abnormal; Notable for the following components:    Potassium 6.3 (*)     Glucose 159 (*)     BUN 66 (*)     Creatinine 8.4 (*)     eGFR if  5.1 (*)     eGFR if non  4.5 (*)     All other components within normal limits   TROPONIN I - Abnormal; Notable for the following components:    Troponin I  3.141 (*)     All other components within normal limits   B-TYPE NATRIURETIC PEPTIDE - Abnormal; Notable for the following components:    BNP 2,004 (*)     All other components within normal limits   ISTAT PROCEDURE - Abnormal; Notable for the following components:    POC PCO2 55.8 (*)     POC PO2 33 (*)     POC HCO3 32.7 (*)     POC SATURATED O2 60 (*)     POC TCO2 34 (*)     All other components within normal limits   POCT GLUCOSE - Abnormal; Notable for the following components:    POCT Glucose 148 (*)     All other components within normal limits   TROPONIN I   SARS-COV-2 RDRP GENE   POCT GLUCOSE MONITORING CONTINUOUS        ECG Results          Repeat EKG 12-lead (In process)  Result time 02/04/22 12:52:39    In process by Interface, Lab In [facility] (02/04/22 12:52:39)                 Narrative:    Test Reason : R06.02,    Vent. Rate : 078 BPM     Atrial Rate : 156 BPM     P-R Int : 000 ms          QRS Dur : 110 ms      QT Int : 428 ms       P-R-T Axes : 026 049 130 degrees     QTc Int : 487 ms    Age and gender specific analysis  Undetermined rhythm  T wave abnormality, consider lateral ischemia  Prolonged QT  Abnormal ECG  When compared with ECG of 04-FEB-2022 11:56,  Current undetermined rhythm precludes rhythm comparison, needs review    Referred By: [email or web address]   SELF           Confirmed By:                              EKG 12-lead (In process)  Result time 02/04/22 12:04:20    In process by Interface, Lab In [facility] (02/04/22 12:04:20)                 Narrative:    Test Reason : R06.02,    Vent. Rate : 080 BPM     Atrial Rate : 080 BPM     P-R Int : 208 ms          QRS Dur : 108 ms      QT Int : 414 ms       P-R-T Axes : 049 048 129 degrees     QTc Int : 477 ms    Age and gender specific analysis  Normal sinus rhythm  ST elevation, consider early repolarization, pericarditis, or injury  ST and T wave abnormality, consider inferolateral ischemia  Prolonged QT  Abnormal ECG  When compared with ECG of  11-JAN-2022 14:52,  ST elevation now present in Inferior leads  T wave inversion now evident in Inferior leads  T wave inversion now evident in Lateral leads    Referred By: [email or web address]   SELF           Confirmed By:                             Imaging Results          X-Ray Chest AP Portable (Final result)  Result time 02/04/22 12:35:20    Final result by Yakelin Sanchez MD (02/04/22 12:35:20)                 Impression:      1. Near complete opacification of the left lung, worsened when compared to prior imaging.  Given the waxing and waning appearance of this process going back to 11/18/2021, cross-sectional imaging could be considered for further evaluation.  2. Radiographic findings suggestive of pulmonary edema and a small right pleural effusion.      Electronically signed by: Yakelin Sanchez  Date:    02/04/2022  Time:    12:35             Narrative:    EXAMINATION:  XR CHEST AP PORTABLE    CLINICAL HISTORY:  Shortness of breath    TECHNIQUE:  Single frontal view of the chest was performed.    COMPARISON:  11/03/2021, 11/18/2021    FINDINGS:  There is near complete opacification of the left lung that is new/worsened when compared to 11/18/2021.  The pulmonary vasculature is prominent and indistinct, new from prior.  Heart size is unchanged.  Small right pleural effusion is present.                                 Medications   dextrose 10% bolus 125 mL (has no administration in time range)   dextrose 10% bolus 250 mL (has no administration in time range)   0.9%  NaCl infusion (0 mL/hr Intravenous Hold 2/4/22 1515)   0.9%  NaCl infusion (has no administration in time range)   atorvastatin tablet 40 mg (has no administration in time range)   citalopram tablet 20 mg (has no administration in time range)   clopidogreL tablet 75 mg (has no administration in time range)   sevelamer carbonate tablet 1,600 mg (has no administration in time range)   sodium chloride 0.9% flush 10 mL (has no administration in time  range)   naloxone 0.4 mg/mL injection 0.02 mg (has no administration in time range)   glucose chewable tablet 16 g (has no administration in time range)   glucose chewable tablet 24 g (has no administration in time range)   glucagon (human recombinant) injection 1 mg (has no administration in time range)   apixaban tablet 5 mg (has no administration in time range)   insulin aspart U-100 pen 0-5 Units (has no administration in time range)   albuterol-ipratropium 2.5 mg-0.5 mg/3 mL nebulizer solution 3 mL (3 mLs Nebulization Given 2/4/22 1205)   aspirin chewable tablet 324 mg (324 mg Oral Given 2/4/22 1312)   albuterol sulfate nebulizer solution 10 mg (10 mg Nebulization Given 2/4/22 1328)   insulin regular injection 5 Units (5 Units Intravenous Given 2/4/22 1334)   calcium gluconate 1g in dextrose 5% 100mL (ready to mix system) (0 g Intravenous Stopped 2/4/22 1452)     Medical Decision Making:   History:   Old Medical Records: I decided to obtain old medical records.  Initial Assessment:   65 yo F presents with SOB and missed dialysis session yesterday.  Vitals stable.  Room air sats around 92%.  This may be close to patient's baseline based on prior measurements in chart.  Afebrile.  Tachypnea noted.  Differential Diagnosis:   My differential diagnosis includes but is not limited to:  Volume overloaded, COPD exacerbation, heart failure, ACS, pneumonia  Independently Interpreted Test(s):   I have ordered and independently interpreted EKG Reading(s) - see summary below       <> Summary of EKG Reading(s): Slight ST elevations in inferior leads as well as V4, V5. New T wave inversions in I, II, aVL, aVF, V4-6. NSR HR 80  Clinical Tests:   Lab Tests: Ordered  Radiological Study: Ordered  Medical Tests: Ordered  Other:   I have discussed this case with another health care provider.       <> Summary of the Discussion: Cardiology, Nephrology, Hospital Medicine             ED Course as of 02/04/22 1708   Fri Feb 04, 2022    1443 Potassium(!!): 6.3 [EH]   1443 Troponin I(!): 3.141 [EH]   1443 BNP(!): 2,004 [EH]   1445 Patient with slight ST elevations noted on EKG.  Discussed with Cardiology given elevated troponin of 3. The ultimately felt that patient's symptoms and lab abnormalities are sequela of missed dialysis treatment.  Discussed with nephrology.  They will evaluate the patient. Pt admitted to  service for dialysis and further mgmt.  [EH]      ED Course User Index  [EH] Rebecca Her PA-C             Clinical Impression:   Final diagnoses:  [R06.02] Shortness of breath  [R06.02] Shortness of breath  [R06.02] SOB (shortness of breath)          ED Disposition Condition    Observation               Rebecca Her PA-C  02/04/22 [id number]

## 2022-02-04 NOTE — ASSESSMENT & PLAN NOTE
BNP elevated at 2000s. CXR with pulmonary edema.     - Fluid removal with HD  - Low salt diet   - Fluid restriction of 1500ml daily

## 2022-02-04 NOTE — Clinical Note
50 ml of contrast were injected throughout the case. 150 mL of contrast was the total wasted during the case. 200 mL was the total amount used during the case.

## 2022-02-04 NOTE — ASSESSMENT & PLAN NOTE
History of persistent AF, follows with EP . Given history of CVA and inability to tolerate AADs, plan for PVI followed by Watchman device placement on 02/07/22.   - Recommend stopping apixaban Sunday night prior to PVI  - Off amiodarone

## 2022-02-04 NOTE — ASSESSMENT & PLAN NOTE
Admitted w SOB in the setting of missed HD treatment, now hypervolemic on exam and CXR. BNP elevated.     - HD for clearance and volume removal   - HD again tomorrow per OP HD schedule and additional volume removal.

## 2022-02-04 NOTE — SUBJECTIVE & OBJECTIVE
Past Medical History:   Diagnosis Date    Anxiety     Breast cancer     left    Carotid artery disease     Cataract     Choledocholithiasis     s/p ERCP and stent placement    Chronic diastolic CHF (congestive heart failure)     Chronic obstructive pulmonary disease     Chronic venous insufficiency     Depression     End-stage renal disease on hemodialysis     M/W/F    GERD (gastroesophageal reflux disease)     History of breast cancer     History of colon cancer 2001    s/p sigmoid colectomy    History of kidney stones     History of osteomyelitis of left foot     History of pancreatitis     History of stroke     Hyperlipidemia     Hypertension     Intracerebral hemorrhage     Lumbar degenerative disc disease     Lumbar spinal stenosis     Morbid obesity     Paroxysmal atrial fibrillation     Peripheral artery disease     Peripheral neuropathy     Tobacco dependence     Type II diabetes mellitus     Urinary incontinence        Past Surgical History:   Procedure Laterality Date    ANGIOGRAPHY OF LOWER EXTREMITY Right 9/17/2020    Procedure: Angiogram Extremity Unilateral;  Surgeon: RICK Pollard III, MD;  Location: [facility] OR [address] [place] [address];  Service: Peripheral Vascular;  Laterality: Right;  6.2 minutes of fluro  690.88 mGy  112.64 Gycm2  55 ml    ANGIOGRAPHY OF LOWER EXTREMITY Left 5/13/2021    Procedure: Angiogram Extremity Unilateral;  Surgeon: HOMERO Hayden II, MD;  Location: [facility] OR [address] [place] [address];  Service: Vascular;  Laterality: Left;  35.1 min  971.41 mGy  190.27 Gy.cm  101ml Dye    ANGIOGRAPHY OF LOWER EXTREMITY Right 7/19/2021    Procedure: Angiogram Extremity Unilateral;  Surgeon: HOMERO Hayden II, MD;  Location: [facility] OR [address] [place] [address];  Service: Vascular;  Laterality: Right;  3.0 min  121.00 mGy  26.8713 Gy.cm  13ml Dye    AORTOGRAPHY N/A 5/13/2021    Procedure: AORTOGRAM;  Surgeon: HOMERO Hayden II, MD;  Location: [facility] OR [address] [place] [address];  Service: Vascular;  Laterality: N/A;    AV  FISTULA PLACEMENT Right 5/28/2018    Procedure: CREATION -FISTULA-AV;  Surgeon: RICK Pollard III, MD;  [facility];  Service: Peripheral Vascular;  Laterality: Right;    BREAST BIOPSY  1/2012    left breast invasive mammary carcinoma (lateral bx) and intraductal papilloma (medial bx)    CARDIOVERSION  11/22/2021    CARDIOVERSION  11/22/2021    Procedure: Cardioversion;  Surgeon: Ad Garcia MD;  Location: [facility] CATH LAB;  Service: Cardiology;;    CATARACT EXTRACTION W/  INTRAOCULAR LENS IMPLANT Right 1/24/2019        CATARACT EXTRACTION W/  INTRAOCULAR LENS IMPLANT Left 1/31/2019    Procedure: EXTRACTION, CATARACT, WITH IOL INSERTION;  Surgeon: Immanuel Moncada MD;  Location: [facility] OR;  Service: Ophthalmology;  Laterality: Left;    CHOLECYSTECTOMY  2001    DEBRIDEMENT OF FOOT Right 2/17/2021    Procedure: DEBRIDEMENT, FOOT right plantar ulcer;  Surgeon: Sonja Corral DPM;  Location: [facility] OR;  Service: Podiatry;  Laterality: Right;    ERCP W/ SPHICTEROTOMY      FINE NEEDLE ASPIRATION  1999    Right breast    FOOT AMPUTATION Left 6/1/2021    Procedure: Transmetatarsal amputation;  Surgeon: Billy Robles DPM;  [facility];  Service: Podiatry;  Laterality: Left;    FOOT AMPUTATION Left 7/23/2021    Procedure: AMPUTATION, FOOT;  Surgeon: Billy Robles DPM;  [facility];  Service: Podiatry;  Laterality: Left;    FOOT AMPUTATION THROUGH METATARSAL Right 10/15/2020    Procedure: PARTIAL RAY AMPUTATION GREAT TOE;  Surgeon: Billy Robles DPM;  [facility];  Service: Podiatry;  Laterality: Right;  Stretcher OK    HERNIA REPAIR      LAPAROSCOPIC NEPHRECTOMY Left 2011    MASTECTOMY  2013    left    OOPHORECTOMY Left 2001    REVISION OF ARTERIOVENOUS FISTULA Right 8/15/2018    Procedure: REVISION, AV FISTULA;  Surgeon: RICK Pollard III, MD;  [facility];  Service: Peripheral Vascular;  Laterality: Right;    SIGMOIDECTOMY   2001    SURGICAL REMOVAL OF METATARSAL HEAD Right 2/17/2021    Procedure: OSTECTOMY, METATARSAL BONE, diatal 1st;  Surgeon: Sonja Corral DPM;  Location: [facility] OR;  Service: Podiatry;  Laterality: Right;    TOE AMPUTATION Left 5/13/2021    Procedure: AMPUTATION, TOE;  Surgeon: HOMERO Hayden II, MD;  [facility];  Service: Vascular;  Laterality: Left;    TOTAL REDUCTION MAMMOPLASTY Right 2013       Review of patient's allergies indicates:   Allergen Reactions    Cyclobenzaprine Hallucinations    Erythromycin      Stomach upset    Keflex [cephalexin]      Yeast infection    Erythromycin base      Other reaction(s): upset stomach       No current facility-administered medications on file prior to encounter.     Current Outpatient Medications on File Prior to Encounter   Medication Sig    acetaminophen-codeine 300-30mg (TYLENOL #3) 300-30 mg Tab Take 1 tablet by mouth every 8 (eight) hours as needed.    albuterol (PROVENTIL/VENTOLIN HFA) 90 mcg/actuation inhaler Inhale 2 puffs into the lungs every 6 (six) hours as needed for Wheezing. Rescue    amiodarone (PACERONE) 200 MG Tab Take 1 tablet (200 mg total) by mouth once daily. (Patient not taking: Reported on 1/11/2022)    amLODIPine (NORVASC) 10 MG tablet Take 1 tablet (10 mg total) by mouth once daily.    atorvastatin (LIPITOR) 40 MG tablet Take 1 tablet (40 mg total) by mouth once daily.    atorvastatin (LIPITOR) 40 MG tablet 1 tablet DAILY (route: oral)    benzonatate (TESSALON) 100 MG capsule Take 1 capsule (100 mg total) by mouth 3 (three) times daily as needed for Cough. (Patient not taking: Reported on 1/11/2022)    blood sugar diagnostic Strp 1 strip by Misc.(Non-Drug; Combo Route) route 4 (four) times daily.    blood-glucose sensor (DEXCOM G6 SENSOR) Umm 1 each by Misc.(Non-Drug; Combo Route) route every 10 days.    blood-glucose transmitter (DEXCOM G6 TRANSMITTER) Umm 1 Device by Misc.(Non-Drug; Combo Route) route continuous.     carvediloL (COREG) 12.5 MG tablet Take 1 tablet (12.5 mg total) by mouth 2 (two) times daily with meals.    citalopram (CELEXA) 20 MG tablet Take 1 tablet (20 mg total) by mouth nightly.    cloNIDine (CATAPRES) 0.1 MG tablet 1 tablet EVERY 8 HOURS (route: oral)    clopidogreL (PLAVIX) 75 mg tablet Take 1 tablet (75 mg total) by mouth once daily.    famotidine (PEPCID) 20 MG tablet Take 1 tablet (20 mg total) by mouth nightly as needed.    fish oil-omega-3 fatty acids 300-1,000 mg capsule Take 1 capsule by mouth every morning.    flash glucose scanning reader (FREESTYLE LULU 14 DAY READER) Misc Use as directed to check blood sugars    flash glucose scanning reader (FREESTYLE LULU 2 READER) Misc 1 Units by Misc.(Non-Drug; Combo Route) route continuous prn.    flash glucose sensor (FREESTYLE LULU 14 DAY SENSOR) Kit Use as directed to check blood sugars    flash glucose sensor (FREESTYLE LULU 2 SENSOR) Kit 1 Units by Misc.(Non-Drug; Combo Route) route every 14 (fourteen) days.    gabapentin (NEURONTIN) 300 MG capsule Take 1 capsule (300 mg total) by mouth every evening.    guaiFENesin (MUCINEX) 600 mg 12 hr tablet Take 1 tablet (600 mg total) by mouth 2 (two) times daily.    hydrALAZINE (APRESOLINE) 50 MG tablet Take 1 tablet (50 mg total) by mouth every 8 (eight) hours. (Patient taking differently: Take 100 mg by mouth 3 (three) times daily.)    insulin aspart U-100 (NOVOLOG) 100 unit/mL (3 mL) InPn pen Inject 3 Units into the skin 3 (three) times daily with meals.    insulin detemir U-100 (LEVEMIR FLEXTOUCH) 100 unit/mL (3 mL) SubQ InPn pen Inject 4 Units into the skin 2 (two) times daily.    lancets (ONETOUCH DELICA LANCETS) 33 gauge Misc 1 lancet by Misc.(Non-Drug; Combo Route) route 4 (four) times daily before meals and nightly.    letrozole (FEMARA) 2.5 mg Tab Take 1 tablet (2.5 mg total) by mouth nightly.    methocarbamoL (ROBAXIN) 500 MG Tab Take 500 mg by mouth 3 (three) times daily.     senna-docusate 8.6-50 mg (PERICOLACE) 8.6-50 mg per tablet Take 1 tablet by mouth 2 (two) times daily.    sevelamer carbonate (RENVELA) 800 mg Tab Take 2 tablets (1,600 mg total) by mouth 3 (three) times daily with meals.     Family History     Problem Relation (Age of Onset)    Arthritis Mother    Breast cancer Maternal Grandmother    Cancer Maternal Grandmother, Maternal Aunt    Celiac disease Sister    Diabetes Father    Heart disease Mother, Father, Maternal Grandmother        Tobacco Use    Smoking status: Current Some Day Smoker     Packs/day: 0.50     Years: 28.00     Pack years: 14.00     Types: Cigarettes     Start date: 1990     Last attempt to quit: 2018     Years since quittin.0    Smokeless tobacco: Never Used    Tobacco comment: anxious   Substance and Sexual Activity    Alcohol use: No    Drug use: No    Sexual activity: Not Currently     Partners: Male     ROS  Objective:     Vital Signs (Most Recent):  Temp: 98.4 °F (36.9 °C) (22 0927)  Pulse: 78 (22 1523)  Resp: 15 (22 1523)  BP: (!) 145/63 (22 1523)  SpO2: 95 % (22 1523) Vital Signs (24h Range):  Temp:  [98.4 °F (36.9 °C)] 98.4 °F (36.9 °C)  Pulse:  [] 78  Resp:  [15-24] 15  SpO2:  [91 %-100 %] 95 %  BP: (125-167)/(60-77) 145/63       Weight: 102 kg (224 lb 13.9 oz)  Body mass index is 38.6 kg/m².    SpO2: 95 %  O2 Device (Oxygen Therapy): nasal cannula    Physical Exam    Significant Labs: {Labs:68512}    Significant Imaging: {Select Imagin}

## 2022-02-04 NOTE — ASSESSMENT & PLAN NOTE
Patient with Long standing persistent (>12 months) atrial fibrillation which is controlled currently with Beta Blocker and Amiodarone. Patient is currently in atrial fibrillation.[id number] Score: 7. Anticoagulation indicated. Anticoagulation done with apixaban.     Plan  - Patient follows with EP- Dr. Kuo and is scheduled for outpatient  RF PVI with possible VIRAJ closure 02/07/2022. Discussed with cardiology to determine if patient can undergo the procedure while here.    - Will hold eliquis 02/06 night  - Off amiodarone. Continue carvedilol  - Maintain K>4, Mg>2

## 2022-02-04 NOTE — ASSESSMENT & PLAN NOTE
Potassium 6.3 on admission in the setting of missed HD session  ECG without hyperkalemic changes. Received calcium    - HD planned for tonight for clearance and volume removal  - Repeat potassium following HD

## 2022-02-04 NOTE — Clinical Note
The groin and left radial was prepped. The site was prepped with ChloraPrep. The site was clipped. The patient was draped. The patient was positioned supine.

## 2022-02-04 NOTE — H&P
[facility] - Emergency Dept  [facility] Medicine  History & Physical    Patient Name: Kylie King  MRN: [id number]  Patient Class: OP- Observation  Admission Date: 2/4/2022  Attending Physician: Ryne Martinez MD   Primary Care Provider: Virginia Foote MD         Patient information was obtained from patient, past medical records and ER records.     Subjective:     Principal Problem:Acute on chronic respiratory failure    Chief Complaint:   Chief Complaint   Patient presents with    Shortness of Breath     Hx of COPD        HPI: Kylie King is a 66 year old female with chronic respiratory failure (on home 2L of O2), COPD, CAD, ESRD on HD TTS w/ anemia of chronic disease, Atrial Fib (failed DCCV in Nov 2021); on eliquis and amiodarone, CVA, PVD (s/p left foot amputation on Plavix), HTN, T2DM, HLD, who presented to the emergency department for dyspnea. Patient reports she has been out of her oxygen for the past few days. She reports dyspnea started a few days ago but worsened overnight. She last had HD on Tuesday and missed her Thursday session due to feeling unwell. She denies chest pain, LE edema. She is anuric. She reports she is scheduled to undergo an RFA for her atrial fibrillation on Monday morning and was told to hold her amiodarone, although she isn't clear as to whether she has been taking the medication. She also reports taking eliquis twice daily. Of note, the patient has had multiple hospital visits (4 in the past six months). She has a history of falls for which eliquis was held, however she recently was seen by EP- Dr. Kuo who recommended she continue her eliquis  but hold it the night prior to RF-PVI and possible VIRAJ closure device.    Upon arrival to the ED, she was hemodynamically stable, placed on 2L of O2. Labs revealed K of 6.3, BNP 2004, Troponin 3.14. CXR revealed pulmonary edema and possible left sided opacification. ECG revealed non specific ST-T wave changes. Cardiology was  consulted. Patient was shifted and received calcium for cardioprotection. Patient was admitted into Hospital medicine for further management.        Past Medical History:   Diagnosis Date    Anxiety     Breast cancer     left    Carotid artery disease     Cataract     Choledocholithiasis     s/p ERCP and stent placement    Chronic diastolic CHF (congestive heart failure)     Chronic obstructive pulmonary disease     Chronic venous insufficiency     Depression     End-stage renal disease on hemodialysis     M/W/F    GERD (gastroesophageal reflux disease)     History of breast cancer     History of colon cancer 2001    s/p sigmoid colectomy    History of kidney stones     History of osteomyelitis of left foot     History of pancreatitis     History of stroke     Hyperlipidemia     Hypertension     Intracerebral hemorrhage     Lumbar degenerative disc disease     Lumbar spinal stenosis     Morbid obesity     Paroxysmal atrial fibrillation     Peripheral artery disease     Peripheral neuropathy     Tobacco dependence     Type II diabetes mellitus     Urinary incontinence        Past Surgical History:   Procedure Laterality Date    ANGIOGRAPHY OF LOWER EXTREMITY Right 9/17/2020    Procedure: Angiogram Extremity Unilateral;  Surgeon: RICK Pollard III, MD;  Location: [facility] OR [address] [place] [address];  Service: Peripheral Vascular;  Laterality: Right;  6.2 minutes of fluro  690.88 mGy  112.64 Gycm2  55 ml    ANGIOGRAPHY OF LOWER EXTREMITY Left 5/13/2021    Procedure: Angiogram Extremity Unilateral;  Surgeon: HOMERO Hayden II, MD;  Location: [address];  Service: Vascular;  Laterality: Left;  35.1 min  971.41 mGy  190.27 Gy.cm  101ml Dye    ANGIOGRAPHY OF LOWER EXTREMITY Right 7/19/2021    Procedure: Angiogram Extremity Unilateral;  Surgeon: HOMERO Hayden II, MD;  Location: [address];  Service: Vascular;  Laterality: Right;  3.0 min  121.00 mGy  26.8713 Gy.cm  13ml Dye    AORTOGRAPHY N/A  5/13/2021    Procedure: AORTOGRAM;  Surgeon: HOMERO Hayden II, MD;  [facility];  Service: Vascular;  Laterality: N/A;    AV FISTULA PLACEMENT Right 5/28/2018    Procedure: CREATION -FISTULA-AV;  Surgeon: RICK Pollard III, MD;  [address];  Service: Peripheral Vascular;  Laterality: Right;    BREAST BIOPSY  1/2012    left breast invasive mammary carcinoma (lateral bx) and intraductal papilloma (medial bx)    CARDIOVERSION  11/22/2021    CARDIOVERSION  11/22/2021    Procedure: Cardioversion;  Surgeon: Ad Garcia MD;  Location: [facility];  Service: Cardiology;;    CATARACT EXTRACTION W/  INTRAOCULAR LENS IMPLANT Right 1/24/2019        CATARACT EXTRACTION W/  INTRAOCULAR LENS IMPLANT Left 1/31/2019    Procedure: EXTRACTION, CATARACT, WITH IOL INSERTION;  Surgeon: Immanuel Moncada MD;  Location: [facility] OR;  Service: Ophthalmology;  Laterality: Left;    CHOLECYSTECTOMY  2001    DEBRIDEMENT OF FOOT Right 2/17/2021    Procedure: DEBRIDEMENT, FOOT right plantar ulcer;  Surgeon: Sonja Corral DPM;  Location: [facility] OR;  Service: Podiatry;  Laterality: Right;    ERCP W/ SPHICTEROTOMY      FINE NEEDLE ASPIRATION  1999    Right breast    FOOT AMPUTATION Left 6/1/2021    Procedure: Transmetatarsal amputation;  Surgeon: Billy Robles DPM;  Location: [facility] OR [address] [place] [address];  Service: Podiatry;  Laterality: Left;    FOOT AMPUTATION Left 7/23/2021    Procedure: AMPUTATION, FOOT;  Surgeon: Billy Robles DPM;  [address];  Service: Podiatry;  Laterality: Left;    FOOT AMPUTATION THROUGH METATARSAL Right 10/15/2020    Procedure: PARTIAL RAY AMPUTATION GREAT TOE;  Surgeon: Billy Robles DPM;  [facility];  Service: Podiatry;  Laterality: Right;  Stretcher OK    HERNIA REPAIR      LAPAROSCOPIC NEPHRECTOMY Left 2011    MASTECTOMY  2013    left    OOPHORECTOMY Left 2001    REVISION OF ARTERIOVENOUS FISTULA Right 8/15/2018    Procedure: REVISION,  AV FISTULA;  Surgeon: RICK Pollard III, MD;  Location: [facility] OR [address] [place] [address];  Service: Peripheral Vascular;  Laterality: Right;    SIGMOIDECTOMY  2001    SURGICAL REMOVAL OF METATARSAL HEAD Right 2/17/2021    Procedure: OSTECTOMY, METATARSAL BONE, diatal 1st;  Surgeon: Sonja Corral DPM;  Location: [facility] OR;  Service: Podiatry;  Laterality: Right;    TOE AMPUTATION Left 5/13/2021    Procedure: AMPUTATION, TOE;  Surgeon: HOMERO Hayden II, MD;  [facility];  Service: Vascular;  Laterality: Left;    TOTAL REDUCTION MAMMOPLASTY Right 2013       Review of patient's allergies indicates:   Allergen Reactions    Cyclobenzaprine Hallucinations    Erythromycin      Stomach upset    Keflex [cephalexin]      Yeast infection    Erythromycin base      Other reaction(s): upset stomach       No current facility-administered medications on file prior to encounter.     Current Outpatient Medications on File Prior to Encounter   Medication Sig    acetaminophen-codeine 300-30mg (TYLENOL #3) 300-30 mg Tab Take 1 tablet by mouth every 8 (eight) hours as needed.    albuterol (PROVENTIL/VENTOLIN HFA) 90 mcg/actuation inhaler Inhale 2 puffs into the lungs every 6 (six) hours as needed for Wheezing. Rescue    amiodarone (PACERONE) 200 MG Tab Take 1 tablet (200 mg total) by mouth once daily. (Patient not taking: Reported on 1/11/2022)    amLODIPine (NORVASC) 10 MG tablet Take 1 tablet (10 mg total) by mouth once daily.    atorvastatin (LIPITOR) 40 MG tablet Take 1 tablet (40 mg total) by mouth once daily.    atorvastatin (LIPITOR) 40 MG tablet 1 tablet DAILY (route: oral)    benzonatate (TESSALON) 100 MG capsule Take 1 capsule (100 mg total) by mouth 3 (three) times daily as needed for Cough. (Patient not taking: Reported on 1/11/2022)    blood sugar diagnostic Strp 1 strip by Misc.(Non-Drug; Combo Route) route 4 (four) times daily.    blood-glucose sensor (DEXCOM G6 SENSOR) Umm 1 each by Misc.(Non-Drug; Combo  Route) route every 10 days.    blood-glucose transmitter (DEXCOM G6 TRANSMITTER) Umm 1 Device by Misc.(Non-Drug; Combo Route) route continuous.    carvediloL (COREG) 12.5 MG tablet Take 1 tablet (12.5 mg total) by mouth 2 (two) times daily with meals.    citalopram (CELEXA) 20 MG tablet Take 1 tablet (20 mg total) by mouth nightly.    cloNIDine (CATAPRES) 0.1 MG tablet 1 tablet EVERY 8 HOURS (route: oral)    clopidogreL (PLAVIX) 75 mg tablet Take 1 tablet (75 mg total) by mouth once daily.    famotidine (PEPCID) 20 MG tablet Take 1 tablet (20 mg total) by mouth nightly as needed.    fish oil-omega-3 fatty acids 300-1,000 mg capsule Take 1 capsule by mouth every morning.    flash glucose scanning reader (FREESTYLE LULU 14 DAY READER) Misc Use as directed to check blood sugars    flash glucose scanning reader (FREESTYLE LULU 2 READER) Misc 1 Units by Misc.(Non-Drug; Combo Route) route continuous prn.    flash glucose sensor (FREESTYLE LULU 14 DAY SENSOR) Kit Use as directed to check blood sugars    flash glucose sensor (FREESTYLE LULU 2 SENSOR) Kit 1 Units by Misc.(Non-Drug; Combo Route) route every 14 (fourteen) days.    gabapentin (NEURONTIN) 300 MG capsule Take 1 capsule (300 mg total) by mouth every evening.    guaiFENesin (MUCINEX) 600 mg 12 hr tablet Take 1 tablet (600 mg total) by mouth 2 (two) times daily.    hydrALAZINE (APRESOLINE) 50 MG tablet Take 1 tablet (50 mg total) by mouth every 8 (eight) hours. (Patient taking differently: Take 100 mg by mouth 3 (three) times daily.)    insulin aspart U-100 (NOVOLOG) 100 unit/mL (3 mL) InPn pen Inject 3 Units into the skin 3 (three) times daily with meals.    insulin detemir U-100 (LEVEMIR FLEXTOUCH) 100 unit/mL (3 mL) SubQ InPn pen Inject 4 Units into the skin 2 (two) times daily.    lancets (ONETOUCH DELICA LANCETS) 33 gauge Misc 1 lancet by Misc.(Non-Drug; Combo Route) route 4 (four) times daily before meals and nightly.    letrozole  (FEMARA) 2.5 mg Tab Take 1 tablet (2.5 mg total) by mouth nightly.    methocarbamoL (ROBAXIN) 500 MG Tab Take 500 mg by mouth 3 (three) times daily.    senna-docusate 8.6-50 mg (PERICOLACE) 8.6-50 mg per tablet Take 1 tablet by mouth 2 (two) times daily.    sevelamer carbonate (RENVELA) 800 mg Tab Take 2 tablets (1,600 mg total) by mouth 3 (three) times daily with meals.     Family History     Problem Relation (Age of Onset)    Arthritis Mother    Breast cancer Maternal Grandmother    Cancer Maternal Grandmother, Maternal Aunt    Celiac disease Sister    Diabetes Father    Heart disease Mother, Father, Maternal Grandmother        Tobacco Use    Smoking status: Current Some Day Smoker     Packs/day: 0.50     Years: 28.00     Pack years: 14.00     Types: Cigarettes     Start date: 1990     Last attempt to quit: 2018     Years since quittin.0    Smokeless tobacco: Never Used    Tobacco comment: anxious   Substance and Sexual Activity    Alcohol use: No    Drug use: No    Sexual activity: Not Currently     Partners: Male     Review of Systems   Constitutional: Positive for activity change and unexpected weight change. Negative for chills and fever.   HENT: Negative for congestion.    Eyes: Negative for visual disturbance.   Respiratory: Positive for shortness of breath. Negative for cough and wheezing.    Cardiovascular: Negative for chest pain.   Gastrointestinal: Negative for abdominal distention, abdominal pain, constipation, diarrhea, nausea and vomiting.   Skin: Negative for color change and pallor.   Neurological: Negative for light-headedness.   Psychiatric/Behavioral: Negative for agitation, behavioral problems and confusion.     Objective:     Vital Signs (Most Recent):  Temp: 98.4 °F (36.9 °C) (22 09)  Pulse: 72 (22 1623)  Resp: 19 (22 1623)  BP: 137/60 (22 1623)  SpO2: 95 % (22 1554) Vital Signs (24h Range):  Temp:  [98.4 °F (36.9 °C)] 98.4 °F (36.9  °C)  Pulse:  [] 72  Resp:  [15-24] 19  SpO2:  [91 %-100 %] 95 %  BP: (125-167)/(60-77) 137/60     Weight: 102 kg (224 lb 13.9 oz)  Body mass index is 38.6 kg/m².    Physical Exam  Vitals and nursing note reviewed.   Constitutional:       General: She is not in acute distress.     Appearance: Normal appearance. She is obese. She is not ill-appearing, toxic-appearing or diaphoretic.   HENT:      Head: Normocephalic and atraumatic.      Nose: Nose normal.      Mouth/Throat:      Mouth: Mucous membranes are moist.   Eyes:      Extraocular Movements: Extraocular movements intact.   Cardiovascular:      Rate and Rhythm: Normal rate and regular rhythm.      Pulses: Normal pulses.      Heart sounds: Normal heart sounds. No murmur heard.      Pulmonary:      Effort: Pulmonary effort is normal. No respiratory distress.      Breath sounds: Normal breath sounds. No wheezing or rales.   Abdominal:      General: Abdomen is flat. Bowel sounds are normal. There is no distension.      Tenderness: There is no abdominal tenderness.   Musculoskeletal:         General: No swelling or tenderness. Normal range of motion.      Cervical back: Normal range of motion.   Skin:     General: Skin is warm.      Coloration: Skin is not jaundiced or pale.   Neurological:      Mental Status: She is alert and oriented to person, place, and time. Mental status is at baseline.      Comments: Somnolent but easily arousable    Psychiatric:         Mood and Affect: Mood normal.         Behavior: Behavior normal.         Thought Content: Thought content normal.             Significant Labs:   All pertinent labs within the past 24 hours have been reviewed.  BMP:   Recent Labs   Lab 02/04/22  1201   *      K 6.3*   CL 95   CO2 27   BUN 66*   CREATININE 8.4*   CALCIUM 9.3     CBC:   Recent Labs   Lab 02/04/22  1201   WBC 8.04   HGB 9.8*   HCT 32.3*        CMP:   Recent Labs   Lab 02/04/22  1201      K 6.3*   CL 95   CO2 27    *   BUN 66*   CREATININE 8.4*   CALCIUM 9.3   PROT 7.7   ALBUMIN 3.6   BILITOT 0.7   ALKPHOS 123   AST 38   ALT 28   ANIONGAP 14   EGFRNONAA 4.5*       Significant Imaging:   Imaging Results          X-Ray Chest AP Portable (Final result)  Result time 02/04/22 12:35:20    Final result by Yakelin Sanchez MD (02/04/22 12:35:20)                 Impression:      1. Near complete opacification of the left lung, worsened when compared to prior imaging.  Given the waxing and waning appearance of this process going back to 11/18/2021, cross-sectional imaging could be considered for further evaluation.  2. Radiographic findings suggestive of pulmonary edema and a small right pleural effusion.      Electronically signed by: Yakelin Sanchez  Date:    02/04/2022  Time:    12:35             Narrative:    EXAMINATION:  XR CHEST AP PORTABLE    CLINICAL HISTORY:  Shortness of breath    TECHNIQUE:  Single frontal view of the chest was performed.    COMPARISON:  11/03/2021, 11/18/2021    FINDINGS:  There is near complete opacification of the left lung that is new/worsened when compared to 11/18/2021.  The pulmonary vasculature is prominent and indistinct, new from prior.  Heart size is unchanged.  Small right pleural effusion is present.                                  Assessment/Plan:     * Acute on chronic respiratory failure  In the setting of hypervolemia due to missed HD session vs decompensated HF.   On home 2L of O2.    - Plan for HD tonight  - Wean O2 to maintain sats 88-92% given hx of COPD      Type 2 myocardial infarction  ECG with non-specific ST-T changes, troponin elevated at 3. BNP elevated in 2000s  Type 2 NSTEMI in the setting of volume overload due to missed HD session.     Plan  - Cardiology consulted in the ED and not concerned for ACS  - Plan for HD tonight.  - Trend troponin until peak.       Atrial fibrillation  Patient with Long standing persistent (>12 months) atrial fibrillation which is  controlled currently with Beta Blocker and Amiodarone. Patient is currently in atrial fibrillation.[id number] Score: 7. Anticoagulation indicated. Anticoagulation done with apixaban.     Plan  - Patient follows with EP- Dr. Kuo and is scheduled for outpatient  RF PVI with possible VIRAJ closure 02/07/2022. Discussed with cardiology to determine if patient can undergo the procedure while here.    - Will hold eliquis 02/06 night  - Off amiodarone. Continue carvedilol  - Maintain K>4, Mg>2      Anemia in ESRD (end-stage renal disease)  Baseline Hgb 9-10. Secondary to anemia of chronic disease.  Hgb 8.4 on admission    - Will discuss EPO administration with nephrology as Hgb <10  - Iron studies  - Daily CBC    Hyperkalemia  Potassium 6.3 on admission in the setting of missed HD session  ECG without hyperkalemic changes. Received calcium    - HD planned for tonight for clearance and volume removal  - Repeat potassium following HD       Mild major depressive disorder  Continue home citalopram    End-stage renal disease on hemodialysis  On home HD TTh S. Missed HD the day prior to hospital admission.     - Nephrology consulted. Plan for HD tonight and tomorrow  - Daily RFP  - Avoid nephrotoxins; renally dose meds  - Renal diet.      Acute on chronic diastolic heart failure  BNP elevated at 2000s. CXR with pulmonary edema.     - Fluid removal with HD  - Low salt diet   - Fluid restriction of 1500ml daily      Type II diabetes mellitus  Controlled A1c 5.9.     - POCT glucose ACHS  - Maintain -180      Hyperlipidemia  Continue home statin      Hypertension  On home amlodipine 10mg, coreg 12.5mg BID, hydralazine 50mg TID    - Currently normotensive. Will hold as expecting HD soon.   - Resume when appropriate      VTE Risk Mitigation (From admission, onward)         Ordered     apixaban tablet 5 mg  2 times daily         02/04/22 1508     IP VTE HIGH RISK PATIENT  Once         02/04/22 1508     Place sequential  compression device  Until discontinued         02/04/22 1508     Reason for No Pharmacological VTE Prophylaxis  Once        Question:  Reasons:  Answer:  Already adequately anticoagulated on oral Anticoagulants    02/04/22 1508                 Level of service 3    Derrell Watson MD  Department of Hospital Medicine   [facility] - Emergency Dept

## 2022-02-04 NOTE — CONSULTS
[facility] - Emergency Dept  Nephrology  Consult Note    Patient Name: Kylie King  MRN: [id number]  Admission Date: 2/4/2022  Hospital Length of Stay: 0 days  Attending Provider: Ryne Martinez MD   Primary Care Physician: Virginia Foote MD  Principal Problem:Shortness of breath    Inpatient consult to Nephrology  Consult performed by: Aleja Lerma, HANY, FNP-C  Consult ordered by: BEVERLY EugeneC  Reason for consult: ESRD Management        Subjective:     HPI: The patient is a 64 yo female with a history of ESRD on HD TuThurSat, insulin dependent T2DM, HFpEF (EF 70%), AF not anticoagulated, COPD on home O2 2L, GERD, HTN, hyperlipidemia, and recent left forefoot amputation due to subacute osteomyelitis who presents today for c/o acute shortness of breath x 1 day. The patient states that her shortness of breath started yesterday afternoon and worsen overnight. She missed her Thursday HD appointment due to not feeling well. She was last dialyze on Tuesday with 5 L removed. Patient with complaints of shortness of breath but denies c/o CP. In the ED, she was noted to be hyperkalemic requiring shifting protocol with an elevated BNP and troponin level. CXR with pulmonary edema. Other electrolytes as expected given ESRD baseline. She is a TTS patient of Dr. Kong at [facility]. Nephrology consulted for management of ESRD and HD treatment.      Past Medical History:   Diagnosis Date    Anxiety     Breast cancer     left    Carotid artery disease     Cataract     Choledocholithiasis     s/p ERCP and stent placement    Chronic diastolic CHF (congestive heart failure)     Chronic obstructive pulmonary disease     Chronic venous insufficiency     Depression     End-stage renal disease on hemodialysis     M/W/F    GERD (gastroesophageal reflux disease)     History of breast cancer     History of colon cancer 2001    s/p sigmoid colectomy    History of kidney stones     History of  osteomyelitis of left foot     History of pancreatitis     History of stroke     Hyperlipidemia     Hypertension     Intracerebral hemorrhage     Lumbar degenerative disc disease     Lumbar spinal stenosis     Morbid obesity     Paroxysmal atrial fibrillation     Peripheral artery disease     Peripheral neuropathy     Tobacco dependence     Type II diabetes mellitus     Urinary incontinence        Past Surgical History:   Procedure Laterality Date    ANGIOGRAPHY OF LOWER EXTREMITY Right 9/17/2020    Procedure: Angiogram Extremity Unilateral;  Surgeon: RICK Pollard III, MD;  Location: [facility] OR [address] [place] [address];  Service: Peripheral Vascular;  Laterality: Right;  6.2 minutes of fluro  690.88 mGy  112.64 Gycm2  55 ml    ANGIOGRAPHY OF LOWER EXTREMITY Left 5/13/2021    Procedure: Angiogram Extremity Unilateral;  Surgeon: HOMERO Hayden II, MD;  Location: [facility] OR [address] [place] [address];  Service: Vascular;  Laterality: Left;  35.1 min  971.41 mGy  190.27 Gy.cm  101ml Dye    ANGIOGRAPHY OF LOWER EXTREMITY Right 7/19/2021    Procedure: Angiogram Extremity Unilateral;  Surgeon: HOMERO Hayden II, MD;  Location: [facility] OR [address] [place] [address];  Service: Vascular;  Laterality: Right;  3.0 min  121.00 mGy  26.8713 Gy.cm  13ml Dye    AORTOGRAPHY N/A 5/13/2021    Procedure: AORTOGRAM;  Surgeon: HOMERO Hayden II, MD;  [facility];  Service: Vascular;  Laterality: N/A;    AV FISTULA PLACEMENT Right 5/28/2018    Procedure: CREATION -FISTULA-AV;  Surgeon: RICK Pollard III, MD;  Location: [facility] OR [address] [place] [address];  Service: Peripheral Vascular;  Laterality: Right;    BREAST BIOPSY  1/2012    left breast invasive mammary carcinoma (lateral bx) and intraductal papilloma (medial bx)    CARDIOVERSION  11/22/2021    CARDIOVERSION  11/22/2021    Procedure: Cardioversion;  Surgeon: Ad Garcia MD;  Location: [facility] CATH LAB;  Service: Cardiology;;    CATARACT EXTRACTION W/  INTRAOCULAR LENS IMPLANT Right 1/24/2019         CATARACT EXTRACTION W/  INTRAOCULAR LENS IMPLANT Left 1/31/2019    Procedure: EXTRACTION, CATARACT, WITH IOL INSERTION;  Surgeon: Immanuel Moncada MD;  Location: [facility] OR;  Service: Ophthalmology;  Laterality: Left;    CHOLECYSTECTOMY  2001    DEBRIDEMENT OF FOOT Right 2/17/2021    Procedure: DEBRIDEMENT, FOOT right plantar ulcer;  Surgeon: Sonja Corral DPM;  Location: [facility] OR;  Service: Podiatry;  Laterality: Right;    ERCP W/ SPHICTEROTOMY      FINE NEEDLE ASPIRATION  1999    Right breast    FOOT AMPUTATION Left 6/1/2021    Procedure: Transmetatarsal amputation;  Surgeon: Billy Robles DPM;  Location: [facility] OR 2ND FLR;  Service: Podiatry;  Laterality: Left;    FOOT AMPUTATION Left 7/23/2021    Procedure: AMPUTATION, FOOT;  Surgeon: Billy Robles DPM;  Location: [facility] OR 2ND FLR;  Service: Podiatry;  Laterality: Left;    FOOT AMPUTATION THROUGH METATARSAL Right 10/15/2020    Procedure: PARTIAL RAY AMPUTATION GREAT TOE;  Surgeon: Billy Robles DPM;  Location: [facility] OR 2ND FLR;  Service: Podiatry;  Laterality: Right;  Stretcher OK    HERNIA REPAIR      LAPAROSCOPIC NEPHRECTOMY Left 2011    MASTECTOMY  2013    left    OOPHORECTOMY Left 2001    REVISION OF ARTERIOVENOUS FISTULA Right 8/15/2018    Procedure: REVISION, AV FISTULA;  Surgeon: RICK Pollard III, MD;  [facility];  Service: Peripheral Vascular;  Laterality: Right;    SIGMOIDECTOMY  2001    SURGICAL REMOVAL OF METATARSAL HEAD Right 2/17/2021    Procedure: OSTECTOMY, METATARSAL BONE, diatal 1st;  Surgeon: Sonja Corral DPM;  Location: [facility] OR;  Service: Podiatry;  Laterality: Right;    TOE AMPUTATION Left 5/13/2021    Procedure: AMPUTATION, TOE;  Surgeon: HOMERO Hayden II, MD;  Location: [facility] OR 2ND FLR;  Service: Vascular;  Laterality: Left;    TOTAL REDUCTION MAMMOPLASTY Right 2013       Review of patient's allergies indicates:   Allergen Reactions    Cyclobenzaprine Hallucinations    Erythromycin      Stomach upset     Keflex [cephalexin]      Yeast infection    Erythromycin base      Other reaction(s): upset stomach     Current Facility-Administered Medications   Medication Frequency    0.9%  NaCl infusion Once    [START ON 2/5/2022] 0.9%  NaCl infusion Once    calcium gluconate 1g in dextrose 5% 100mL (ready to mix system) ED 1 Time    dextrose 10% bolus 125 mL PRN    dextrose 10% bolus 250 mL PRN     Current Outpatient Medications   Medication    acetaminophen-codeine 300-30mg (TYLENOL #3) 300-30 mg Tab    albuterol (PROVENTIL/VENTOLIN HFA) 90 mcg/actuation inhaler    amiodarone (PACERONE) 200 MG Tab    amLODIPine (NORVASC) 10 MG tablet    atorvastatin (LIPITOR) 40 MG tablet    atorvastatin (LIPITOR) 40 MG tablet    benzonatate (TESSALON) 100 MG capsule    blood sugar diagnostic Strp    blood-glucose sensor (DEXCOM G6 SENSOR) Umm    blood-glucose transmitter (DEXCOM G6 TRANSMITTER) Umm    carvediloL (COREG) 12.5 MG tablet    citalopram (CELEXA) 20 MG tablet    cloNIDine (CATAPRES) 0.1 MG tablet    clopidogreL (PLAVIX) 75 mg tablet    famotidine (PEPCID) 20 MG tablet    fish oil-omega-3 fatty acids 300-1,000 mg capsule    flash glucose scanning reader (FREESTYLE LULU 14 DAY READER) Misc    flash glucose scanning reader (FREESTYLE LULU 2 READER) Misc    flash glucose sensor (FREESTYLE LULU 14 DAY SENSOR) Kit    flash glucose sensor (FREESTYLE LULU 2 SENSOR) Kit    gabapentin (NEURONTIN) 300 MG capsule    guaiFENesin (MUCINEX) 600 mg 12 hr tablet    hydrALAZINE (APRESOLINE) 50 MG tablet    insulin aspart U-100 (NOVOLOG) 100 unit/mL (3 mL) InPn pen    insulin detemir U-100 (LEVEMIR FLEXTOUCH) 100 unit/mL (3 mL) SubQ InPn pen    lancets (ONETOUCH DELICA LANCETS) 33 gauge Misc    letrozole (FEMARA) 2.5 mg Tab    methocarbamoL (ROBAXIN) 500 MG Tab    senna-docusate 8.6-50 mg (PERICOLACE) 8.6-50 mg per tablet    sevelamer carbonate (RENVELA) 800 mg Tab     Family History     Problem  Relation (Age of Onset)    Arthritis Mother    Breast cancer Maternal Grandmother    Cancer Maternal Grandmother, Maternal Aunt    Celiac disease Sister    Diabetes Father    Heart disease Mother, Father, Maternal Grandmother        Tobacco Use    Smoking status: Current Some Day Smoker     Packs/day: 0.50     Years: 28.00     Pack years: 14.00     Types: Cigarettes     Start date: 1990     Last attempt to quit: 2018     Years since quittin.0    Smokeless tobacco: Never Used    Tobacco comment: anxious   Substance and Sexual Activity    Alcohol use: No    Drug use: No    Sexual activity: Not Currently     Partners: Male     Review of Systems   Unable to perform ROS: Acuity of condition     Objective:     Vital Signs (Most Recent):  Temp: 98.4 °F (36.9 °C) (22 0927)  Pulse: 82 (22 1352)  Resp: 20 (22 1352)  BP: 134/63 (22 1224)  SpO2: 100 % (22 1352)  O2 Device (Oxygen Therapy): nasal cannula (22 1328) Vital Signs (24h Range):  Temp:  [98.4 °F (36.9 °C)] 98.4 °F (36.9 °C)  Pulse:  [] 82  Resp:  [18-24] 20  SpO2:  [91 %-100 %] 100 %  BP: (134-167)/(63-77) 134/63     Weight: 102 kg (224 lb 13.9 oz) (22 1120)  Body mass index is 38.6 kg/m².  Body surface area is 2.15 meters squared.    No intake/output data recorded.    Physical Exam  Vitals and nursing note reviewed.   Constitutional:       General: Vital signs are normal.      Appearance: She is well-nourished. She is obese. She is ill-appearing and toxic-appearing.      Interventions: Nasal cannula and face mask in place.   HENT:      Head: Normocephalic and atraumatic.      Right Ear: Hearing normal.      Left Ear: Hearing normal.   Eyes:      Conjunctiva/sclera: Conjunctivae normal.   Cardiovascular:      Rate and Rhythm: Normal rate and regular rhythm.      Pulses: Normal pulses.           Radial pulses are 2+ on the right side and 2+ on the left side.      Heart sounds: Normal heart sounds.       Arteriovenous access: right arteriovenous access is present.  Pulmonary:      Effort: Pulmonary effort is normal. No respiratory distress.      Breath sounds: Rales present. No wheezing.   Abdominal:      General: Bowel sounds are normal. There is no distension or ascites.      Palpations: Abdomen is soft.      Tenderness: There is no abdominal tenderness.   Musculoskeletal:         General: Normal range of motion.      Cervical back: Normal range of motion.      Right lower le+ Pitting Edema present.      Left lower le+ Pitting Edema present.   Skin:     General: Skin is warm, dry and intact.   Neurological:      Mental Status: She is alert and oriented to person, place, and time.      Deep Tendon Reflexes: Strength normal.   Psychiatric:         Mood and Affect: Mood and affect and mood normal.         Behavior: Behavior normal. Behavior is cooperative.         Thought Content: Thought content normal.         Judgment: Judgment normal.         Significant Labs:  Cardiac Markers: No results for input(s): CKMB, TROPONINT, MYOGLOBIN in the last 168 hours.  CBC:   Recent Labs   Lab 22  1201   WBC 8.04   RBC 3.49*   HGB 9.8*   HCT 32.3*      MCV 93   MCH 28.1   MCHC 30.3*     CMP:   Recent Labs   Lab 22  1201   *   CALCIUM 9.3   ALBUMIN 3.6   PROT 7.7      K 6.3*   CO2 27   CL 95   BUN 66*   CREATININE 8.4*   ALKPHOS 123   ALT 28   AST 38   BILITOT 0.7     All labs within the past 24 hours have been reviewed.    Assessment/Plan:     * Shortness of breath  Admitted w SOB in the setting of missed HD treatment, now hypervolemic on exam and CXR. BNP elevated.     - HD for clearance and volume removal   - HD again tomorrow per OP HD schedule and additional volume removal.    End-stage renal disease on hemodialysis  Nephrology History  iHD Schedule: TTS  Unit/MD: Dr. Kong at [facility].   Duration: 4 hrs  EDW: ?  Access: RFA AVF   Resodial Renal Function: Anuric     Assessment:    - Will provide dialysis for metabolic clearance and volume management today. .   - Dialysate adjusted to current labs   - Will repeat HD treatment tomorrow per OP schedule for ongoing volume removal and clearance.   - Will obtain OP dialysis records  - Continue to monitor intake and output, daily weights   - Avoid nephrotoxic medication and renal dose medications to GFR  - Will continue to monitor    Anemia of Chronic Kidney Disease   - Hgb 9.8 .Goal in ESRD is Hgb of 10-11.   - EPO can be administered and dosed per her OP unit upon discharge.    Mineral Bone Disease in CKD   - Recommend renal diet.  - Novasource with meals  - Continue home phos binder   - Daily renal panel so that phos and albumin is monitored daily.     Hyperkalemia  K 6.3 on arrival.     - received shifting protocol   - HD today for metabolic clearance    Hypertension  - Goal for BP is <140 mmHg SBP and BDP <90 mmHg, maintain MAP > 65.  - HD will help with volume removal and HTN management  - Continue BP meds. Hold on HD days  - No lab stick or BP intake on access site.      Thank you for your consult. I will follow-up with patient. Please contact us if you have any additional questions.    Aleja Lerma, HANY, FNP-C  Nephrology  [facility] - Emergency Dept

## 2022-02-04 NOTE — SUBJECTIVE & OBJECTIVE
Past Medical History:   Diagnosis Date    Anxiety     Breast cancer     left    Carotid artery disease     Cataract     Choledocholithiasis     s/p ERCP and stent placement    Chronic diastolic CHF (congestive heart failure)     Chronic obstructive pulmonary disease     Chronic venous insufficiency     Depression     End-stage renal disease on hemodialysis     M/W/F    GERD (gastroesophageal reflux disease)     History of breast cancer     History of colon cancer 2001    s/p sigmoid colectomy    History of kidney stones     History of osteomyelitis of left foot     History of pancreatitis     History of stroke     Hyperlipidemia     Hypertension     Intracerebral hemorrhage     Lumbar degenerative disc disease     Lumbar spinal stenosis     Morbid obesity     Paroxysmal atrial fibrillation     Peripheral artery disease     Peripheral neuropathy     Tobacco dependence     Type II diabetes mellitus     Urinary incontinence        Past Surgical History:   Procedure Laterality Date    ANGIOGRAPHY OF LOWER EXTREMITY Right 9/17/2020    Procedure: Angiogram Extremity Unilateral;  Surgeon: RICK Pollard III, MD;  Location: [facility] OR [address] [place] [address];  Service: Peripheral Vascular;  Laterality: Right;  6.2 minutes of fluro  690.88 mGy  112.64 Gycm2  55 ml    ANGIOGRAPHY OF LOWER EXTREMITY Left 5/13/2021    Procedure: Angiogram Extremity Unilateral;  Surgeon: HOMERO Hayden II, MD;  Location: [facility] OR [address] [place] [address];  Service: Vascular;  Laterality: Left;  35.1 min  971.41 mGy  190.27 Gy.cm  101ml Dye    ANGIOGRAPHY OF LOWER EXTREMITY Right 7/19/2021    Procedure: Angiogram Extremity Unilateral;  Surgeon: HOMERO Hayden II, MD;  Location: [facility] OR [address] [place] [address];  Service: Vascular;  Laterality: Right;  3.0 min  121.00 mGy  26.8713 Gy.cm  13ml Dye    AORTOGRAPHY N/A 5/13/2021    Procedure: AORTOGRAM;  Surgeon: HOMERO Hayden II, MD;  Location: [facility] OR [address] [place] [address];  Service: Vascular;  Laterality: N/A;    AV  FISTULA PLACEMENT Right 5/28/2018    Procedure: CREATION -FISTULA-AV;  Surgeon: RICK Pollard III, MD;  [facility];  Service: Peripheral Vascular;  Laterality: Right;    BREAST BIOPSY  1/2012    left breast invasive mammary carcinoma (lateral bx) and intraductal papilloma (medial bx)    CARDIOVERSION  11/22/2021    CARDIOVERSION  11/22/2021    Procedure: Cardioversion;  Surgeon: Ad Garcia MD;  Location: [facility] CATH LAB;  Service: Cardiology;;    CATARACT EXTRACTION W/  INTRAOCULAR LENS IMPLANT Right 1/24/2019        CATARACT EXTRACTION W/  INTRAOCULAR LENS IMPLANT Left 1/31/2019    Procedure: EXTRACTION, CATARACT, WITH IOL INSERTION;  Surgeon: Immanuel Moncada MD;  Location: [facility] OR;  Service: Ophthalmology;  Laterality: Left;    CHOLECYSTECTOMY  2001    DEBRIDEMENT OF FOOT Right 2/17/2021    Procedure: DEBRIDEMENT, FOOT right plantar ulcer;  Surgeon: Sonja Corral DPM;  Location: [facility] OR;  Service: Podiatry;  Laterality: Right;    ERCP W/ SPHICTEROTOMY      FINE NEEDLE ASPIRATION  1999    Right breast    FOOT AMPUTATION Left 6/1/2021    Procedure: Transmetatarsal amputation;  Surgeon: Billy Robles DPM;  [facility];  Service: Podiatry;  Laterality: Left;    FOOT AMPUTATION Left 7/23/2021    Procedure: AMPUTATION, FOOT;  Surgeon: Billy Robles DPM;  [facility];  Service: Podiatry;  Laterality: Left;    FOOT AMPUTATION THROUGH METATARSAL Right 10/15/2020    Procedure: PARTIAL RAY AMPUTATION GREAT TOE;  Surgeon: Billy Robles DPM;  [facility];  Service: Podiatry;  Laterality: Right;  Stretcher OK    HERNIA REPAIR      LAPAROSCOPIC NEPHRECTOMY Left 2011    MASTECTOMY  2013    left    OOPHORECTOMY Left 2001    REVISION OF ARTERIOVENOUS FISTULA Right 8/15/2018    Procedure: REVISION, AV FISTULA;  Surgeon: RICK Pollard III, MD;  [facility];  Service: Peripheral Vascular;  Laterality: Right;    SIGMOIDECTOMY   2001    SURGICAL REMOVAL OF METATARSAL HEAD Right 2/17/2021    Procedure: OSTECTOMY, METATARSAL BONE, diatal 1st;  Surgeon: Sonja Corral DPM;  Location: [facility] OR;  Service: Podiatry;  Laterality: Right;    TOE AMPUTATION Left 5/13/2021    Procedure: AMPUTATION, TOE;  Surgeon: HOMERO Hayden II, MD;  Location: [facility] OR [facility] FLR;  Service: Vascular;  Laterality: Left;    TOTAL REDUCTION MAMMOPLASTY Right 2013       Review of patient's allergies indicates:   Allergen Reactions    Cyclobenzaprine Hallucinations    Erythromycin      Stomach upset    Keflex [cephalexin]      Yeast infection    Erythromycin base      Other reaction(s): upset stomach     Current Facility-Administered Medications   Medication Frequency    0.9%  NaCl infusion Once    [START ON 2/5/2022] 0.9%  NaCl infusion Once    calcium gluconate 1g in dextrose 5% 100mL (ready to mix system) ED 1 Time    dextrose 10% bolus 125 mL PRN    dextrose 10% bolus 250 mL PRN     Current Outpatient Medications   Medication    acetaminophen-codeine 300-30mg (TYLENOL #3) 300-30 mg Tab    albuterol (PROVENTIL/VENTOLIN HFA) 90 mcg/actuation inhaler    amiodarone (PACERONE) 200 MG Tab    amLODIPine (NORVASC) 10 MG tablet    atorvastatin (LIPITOR) 40 MG tablet    atorvastatin (LIPITOR) 40 MG tablet    benzonatate (TESSALON) 100 MG capsule    blood sugar diagnostic Strp    blood-glucose sensor (DEXCOM G6 SENSOR) Umm    blood-glucose transmitter (DEXCOM G6 TRANSMITTER) Umm    carvediloL (COREG) 12.5 MG tablet    citalopram (CELEXA) 20 MG tablet    cloNIDine (CATAPRES) 0.1 MG tablet    clopidogreL (PLAVIX) 75 mg tablet    famotidine (PEPCID) 20 MG tablet    fish oil-omega-3 fatty acids 300-1,000 mg capsule    flash glucose scanning reader (FREESTYLE LULU 14 DAY READER) Misc    flash glucose scanning reader (FREESTYLE LULU 2 READER) Misc    flash glucose sensor (FREESTYLE LULU 14 DAY SENSOR) Kit    flash glucose sensor (FREESTYLE LULU 2  SENSOR) Kit    gabapentin (NEURONTIN) 300 MG capsule    guaiFENesin (MUCINEX) 600 mg 12 hr tablet    hydrALAZINE (APRESOLINE) 50 MG tablet    insulin aspart U-100 (NOVOLOG) 100 unit/mL (3 mL) InPn pen    insulin detemir U-100 (LEVEMIR FLEXTOUCH) 100 unit/mL (3 mL) SubQ InPn pen    lancets (ONETOUCH DELICA LANCETS) 33 gauge Misc    letrozole (FEMARA) 2.5 mg Tab    methocarbamoL (ROBAXIN) 500 MG Tab    senna-docusate 8.6-50 mg (PERICOLACE) 8.6-50 mg per tablet    sevelamer carbonate (RENVELA) 800 mg Tab     Family History     Problem Relation (Age of Onset)    Arthritis Mother    Breast cancer Maternal Grandmother    Cancer Maternal Grandmother, Maternal Aunt    Celiac disease Sister    Diabetes Father    Heart disease Mother, Father, Maternal Grandmother        Tobacco Use    Smoking status: Current Some Day Smoker     Packs/day: 0.50     Years: 28.00     Pack years: 14.00     Types: Cigarettes     Start date: 1990     Last attempt to quit: 2018     Years since quittin.0    Smokeless tobacco: Never Used    Tobacco comment: anxious   Substance and Sexual Activity    Alcohol use: No    Drug use: No    Sexual activity: Not Currently     Partners: Male     Review of Systems   Unable to perform ROS: Acuity of condition     Objective:     Vital Signs (Most Recent):  Temp: 98.4 °F (36.9 °C) (22 0927)  Pulse: 82 (22 1352)  Resp: 20 (22 1352)  BP: 134/63 (22 1224)  SpO2: 100 % (22 1352)  O2 Device (Oxygen Therapy): nasal cannula (22 1328) Vital Signs (24h Range):  Temp:  [98.4 °F (36.9 °C)] 98.4 °F (36.9 °C)  Pulse:  [] 82  Resp:  [18-24] 20  SpO2:  [91 %-100 %] 100 %  BP: (134-167)/(63-77) 134/63     Weight: 102 kg (224 lb 13.9 oz) (22 1120)  Body mass index is 38.6 kg/m².  Body surface area is 2.15 meters squared.    No intake/output data recorded.    Physical Exam  Vitals and nursing note reviewed.   Constitutional:       General: Vital signs are  normal.      Appearance: She is well-nourished. She is obese. She is ill-appearing and toxic-appearing.      Interventions: Nasal cannula and face mask in place.   HENT:      Head: Normocephalic and atraumatic.      Right Ear: Hearing normal.      Left Ear: Hearing normal.   Eyes:      Conjunctiva/sclera: Conjunctivae normal.   Cardiovascular:      Rate and Rhythm: Normal rate and regular rhythm.      Pulses: Normal pulses.           Radial pulses are 2+ on the right side and 2+ on the left side.      Heart sounds: Normal heart sounds.      Arteriovenous access: right arteriovenous access is present.  Pulmonary:      Effort: Pulmonary effort is normal. No respiratory distress.      Breath sounds: Rales present. No wheezing.   Abdominal:      General: Bowel sounds are normal. There is no distension or ascites.      Palpations: Abdomen is soft.      Tenderness: There is no abdominal tenderness.   Musculoskeletal:         General: Normal range of motion.      Cervical back: Normal range of motion.      Right lower le+ Pitting Edema present.      Left lower le+ Pitting Edema present.   Skin:     General: Skin is warm, dry and intact.   Neurological:      Mental Status: She is alert and oriented to person, place, and time.      Deep Tendon Reflexes: Strength normal.   Psychiatric:         Mood and Affect: Mood and affect and mood normal.         Behavior: Behavior normal. Behavior is cooperative.         Thought Content: Thought content normal.         Judgment: Judgment normal.         Significant Labs:  Cardiac Markers: No results for input(s): CKMB, TROPONINT, MYOGLOBIN in the last 168 hours.  CBC:   Recent Labs   Lab 22  1201   WBC 8.04   RBC 3.49*   HGB 9.8*   HCT 32.3*      MCV 93   MCH 28.1   MCHC 30.3*     CMP:   Recent Labs   Lab 22  1201   *   CALCIUM 9.3   ALBUMIN 3.6   PROT 7.7      K 6.3*   CO2 27   CL 95   BUN 66*   CREATININE 8.4*   ALKPHOS 123   ALT 28   AST 38    BILITOT 0.7     All labs within the past 24 hours have been reviewed.

## 2022-02-04 NOTE — ASSESSMENT & PLAN NOTE
On home amlodipine 10mg, coreg 12.5mg BID, hydralazine 50mg TID    - Currently normotensive. Will hold as expecting HD soon.   - Resume when appropriate

## 2022-02-04 NOTE — HPI
The patient is a 62 yo female with a history of ESRD on HD TuThurSat, insulin dependent T2DM, HFpEF (EF 70%), AF not anticoagulated, COPD on home O2 2L, GERD, HTN, hyperlipidemia, and recent left forefoot amputation due to subacute osteomyelitis who presents today for c/o acute shortness of breath x 1 day. The patient states that her shortness of breath started yesterday afternoon and worsen overnight. She missed her Thursday HD appointment due to not feeling well. She was last dialyze on Tuesday with 5 L removed. Patient with complaints of shortness of breath but denies c/o CP. In the ED, she was noted to be hyperkalemic requiring shifting protocol with an elevated BNP and troponin level. CXR with pulmonary edema. Other electrolytes as expected given ESRD baseline. She is a TTS patient of Dr. Kong at [facility]. Nephrology consulted for management of ESRD and HD treatment.

## 2022-02-04 NOTE — HPI
Ms. King is a 67 y/o F with a history of HTN, pAF, ESRD on HD T/Th/Sat,  insulin dependent T2DM, HFpEF (EF 70%), COPD on home O2 2L, GERD, HTN, hyperlipidemia, and recent left forefoot amputation due to subacute osteomyelitis who presents today for c/o acute shortness of breath x 1 day. Per chart review (as patient was lethargic and not answering questions upon my assessment), the patient stated that her shortness of breath started yesterday afternoon and worsened overnight. She missed her Thursday HD appointment due to not feeling well. She was last dialyzed on Tuesday.  Denies any chest discomfort.  She had an episode of lightheadedness and nausea today but this has resolved.    Upon arrival to the ER, vitals notable for BP of 146/70, RR 24 satting 94% on 4L NC. ECG with normal sinus rhythm, nonspecific ST-T changes. Labs notable for troponin elevated to 3.1, BNP > 2,000. CXR with pulmonary edema with R pleural effusion. Cardiology consulted given elevated troponin to assess for ACS.

## 2022-02-04 NOTE — ASSESSMENT & PLAN NOTE
- Goal for BP is <140 mmHg SBP and BDP <90 mmHg, maintain MAP > 65.  - HD will help with volume removal and HTN management  - Continue BP meds. Hold on HD days  - No lab stick or BP intake on access site.

## 2022-02-04 NOTE — SUBJECTIVE & OBJECTIVE
Past Medical History:   Diagnosis Date    Anxiety     Breast cancer     left    Carotid artery disease     Cataract     Choledocholithiasis     s/p ERCP and stent placement    Chronic diastolic CHF (congestive heart failure)     Chronic obstructive pulmonary disease     Chronic venous insufficiency     Depression     End-stage renal disease on hemodialysis     M/W/F    GERD (gastroesophageal reflux disease)     History of breast cancer     History of colon cancer 2001    s/p sigmoid colectomy    History of kidney stones     History of osteomyelitis of left foot     History of pancreatitis     History of stroke     Hyperlipidemia     Hypertension     Intracerebral hemorrhage     Lumbar degenerative disc disease     Lumbar spinal stenosis     Morbid obesity     Paroxysmal atrial fibrillation     Peripheral artery disease     Peripheral neuropathy     Tobacco dependence     Type II diabetes mellitus     Urinary incontinence        Past Surgical History:   Procedure Laterality Date    ANGIOGRAPHY OF LOWER EXTREMITY Right 9/17/2020    Procedure: Angiogram Extremity Unilateral;  Surgeon: RICK Pollard III, MD;  Location: [facility] OR [address] [place] [address];  Service: Peripheral Vascular;  Laterality: Right;  6.2 minutes of fluro  690.88 mGy  112.64 Gycm2  55 ml    ANGIOGRAPHY OF LOWER EXTREMITY Left 5/13/2021    Procedure: Angiogram Extremity Unilateral;  Surgeon: HOMERO Hayden II, MD;  Location: [facility] OR [address] [place] [address];  Service: Vascular;  Laterality: Left;  35.1 min  971.41 mGy  190.27 Gy.cm  101ml Dye    ANGIOGRAPHY OF LOWER EXTREMITY Right 7/19/2021    Procedure: Angiogram Extremity Unilateral;  Surgeon: HOMERO Hayden II, MD;  Location: [facility] OR [address] [place] [address];  Service: Vascular;  Laterality: Right;  3.0 min  121.00 mGy  26.8713 Gy.cm  13ml Dye    AORTOGRAPHY N/A 5/13/2021    Procedure: AORTOGRAM;  Surgeon: HOMERO Hayden II, MD;  Location: [facility] OR [address] [place] [address];  Service: Vascular;  Laterality: N/A;    AV  FISTULA PLACEMENT Right 5/28/2018    Procedure: CREATION -FISTULA-AV;  Surgeon: RICK Pollard III, MD;  [facility];  Service: Peripheral Vascular;  Laterality: Right;    BREAST BIOPSY  1/2012    left breast invasive mammary carcinoma (lateral bx) and intraductal papilloma (medial bx)    CARDIOVERSION  11/22/2021    CARDIOVERSION  11/22/2021    Procedure: Cardioversion;  Surgeon: Ad Garcia MD;  Location: [facility] CATH LAB;  Service: Cardiology;;    CATARACT EXTRACTION W/  INTRAOCULAR LENS IMPLANT Right 1/24/2019        CATARACT EXTRACTION W/  INTRAOCULAR LENS IMPLANT Left 1/31/2019    Procedure: EXTRACTION, CATARACT, WITH IOL INSERTION;  Surgeon: Immanuel Moncada MD;  Location: [facility] OR;  Service: Ophthalmology;  Laterality: Left;    CHOLECYSTECTOMY  2001    DEBRIDEMENT OF FOOT Right 2/17/2021    Procedure: DEBRIDEMENT, FOOT right plantar ulcer;  Surgeon: Sonja Corral DPM;  Location: [facility] OR;  Service: Podiatry;  Laterality: Right;    ERCP W/ SPHICTEROTOMY      FINE NEEDLE ASPIRATION  1999    Right breast    FOOT AMPUTATION Left 6/1/2021    Procedure: Transmetatarsal amputation;  Surgeon: Billy Robles DPM;  [facility];  Service: Podiatry;  Laterality: Left;    FOOT AMPUTATION Left 7/23/2021    Procedure: AMPUTATION, FOOT;  Surgeon: Billy Robles DPM;  [facility];  Service: Podiatry;  Laterality: Left;    FOOT AMPUTATION THROUGH METATARSAL Right 10/15/2020    Procedure: PARTIAL RAY AMPUTATION GREAT TOE;  Surgeon: Billy Robles DPM;  [facility];  Service: Podiatry;  Laterality: Right;  Stretcher OK    HERNIA REPAIR      LAPAROSCOPIC NEPHRECTOMY Left 2011    MASTECTOMY  2013    left    OOPHORECTOMY Left 2001    REVISION OF ARTERIOVENOUS FISTULA Right 8/15/2018    Procedure: REVISION, AV FISTULA;  Surgeon: RICK Pollard III, MD;  [facility];  Service: Peripheral Vascular;  Laterality: Right;    SIGMOIDECTOMY   2001    SURGICAL REMOVAL OF METATARSAL HEAD Right 2/17/2021    Procedure: OSTECTOMY, METATARSAL BONE, diatal 1st;  Surgeon: Sonja Corral DPM;  Location: [facility] OR;  Service: Podiatry;  Laterality: Right;    TOE AMPUTATION Left 5/13/2021    Procedure: AMPUTATION, TOE;  Surgeon: HOMERO Hayden II, MD;  [facility];  Service: Vascular;  Laterality: Left;    TOTAL REDUCTION MAMMOPLASTY Right 2013       Review of patient's allergies indicates:   Allergen Reactions    Cyclobenzaprine Hallucinations    Erythromycin      Stomach upset    Keflex [cephalexin]      Yeast infection    Erythromycin base      Other reaction(s): upset stomach       No current facility-administered medications on file prior to encounter.     Current Outpatient Medications on File Prior to Encounter   Medication Sig    acetaminophen-codeine 300-30mg (TYLENOL #3) 300-30 mg Tab Take 1 tablet by mouth every 8 (eight) hours as needed.    albuterol (PROVENTIL/VENTOLIN HFA) 90 mcg/actuation inhaler Inhale 2 puffs into the lungs every 6 (six) hours as needed for Wheezing. Rescue    amiodarone (PACERONE) 200 MG Tab Take 1 tablet (200 mg total) by mouth once daily. (Patient not taking: Reported on 1/11/2022)    amLODIPine (NORVASC) 10 MG tablet Take 1 tablet (10 mg total) by mouth once daily.    atorvastatin (LIPITOR) 40 MG tablet Take 1 tablet (40 mg total) by mouth once daily.    atorvastatin (LIPITOR) 40 MG tablet 1 tablet DAILY (route: oral)    benzonatate (TESSALON) 100 MG capsule Take 1 capsule (100 mg total) by mouth 3 (three) times daily as needed for Cough. (Patient not taking: Reported on 1/11/2022)    blood sugar diagnostic Strp 1 strip by Misc.(Non-Drug; Combo Route) route 4 (four) times daily.    blood-glucose sensor (DEXCOM G6 SENSOR) Umm 1 each by Misc.(Non-Drug; Combo Route) route every 10 days.    blood-glucose transmitter (DEXCOM G6 TRANSMITTER) Umm 1 Device by Misc.(Non-Drug; Combo Route) route continuous.     carvediloL (COREG) 12.5 MG tablet Take 1 tablet (12.5 mg total) by mouth 2 (two) times daily with meals.    citalopram (CELEXA) 20 MG tablet Take 1 tablet (20 mg total) by mouth nightly.    cloNIDine (CATAPRES) 0.1 MG tablet 1 tablet EVERY 8 HOURS (route: oral)    clopidogreL (PLAVIX) 75 mg tablet Take 1 tablet (75 mg total) by mouth once daily.    famotidine (PEPCID) 20 MG tablet Take 1 tablet (20 mg total) by mouth nightly as needed.    fish oil-omega-3 fatty acids 300-1,000 mg capsule Take 1 capsule by mouth every morning.    flash glucose scanning reader (FREESTYLE LULU 14 DAY READER) Misc Use as directed to check blood sugars    flash glucose scanning reader (FREESTYLE LULU 2 READER) Misc 1 Units by Misc.(Non-Drug; Combo Route) route continuous prn.    flash glucose sensor (FREESTYLE LULU 14 DAY SENSOR) Kit Use as directed to check blood sugars    flash glucose sensor (FREESTYLE LULU 2 SENSOR) Kit 1 Units by Misc.(Non-Drug; Combo Route) route every 14 (fourteen) days.    gabapentin (NEURONTIN) 300 MG capsule Take 1 capsule (300 mg total) by mouth every evening.    guaiFENesin (MUCINEX) 600 mg 12 hr tablet Take 1 tablet (600 mg total) by mouth 2 (two) times daily.    hydrALAZINE (APRESOLINE) 50 MG tablet Take 1 tablet (50 mg total) by mouth every 8 (eight) hours. (Patient taking differently: Take 100 mg by mouth 3 (three) times daily.)    insulin aspart U-100 (NOVOLOG) 100 unit/mL (3 mL) InPn pen Inject 3 Units into the skin 3 (three) times daily with meals.    insulin detemir U-100 (LEVEMIR FLEXTOUCH) 100 unit/mL (3 mL) SubQ InPn pen Inject 4 Units into the skin 2 (two) times daily.    lancets (ONETOUCH DELICA LANCETS) 33 gauge Misc 1 lancet by Misc.(Non-Drug; Combo Route) route 4 (four) times daily before meals and nightly.    letrozole (FEMARA) 2.5 mg Tab Take 1 tablet (2.5 mg total) by mouth nightly.    methocarbamoL (ROBAXIN) 500 MG Tab Take 500 mg by mouth 3 (three) times daily.     senna-docusate 8.6-50 mg (PERICOLACE) 8.6-50 mg per tablet Take 1 tablet by mouth 2 (two) times daily.    sevelamer carbonate (RENVELA) 800 mg Tab Take 2 tablets (1,600 mg total) by mouth 3 (three) times daily with meals.     Family History     Problem Relation (Age of Onset)    Arthritis Mother    Breast cancer Maternal Grandmother    Cancer Maternal Grandmother, Maternal Aunt    Celiac disease Sister    Diabetes Father    Heart disease Mother, Father, Maternal Grandmother        Tobacco Use    Smoking status: Current Some Day Smoker     Packs/day: 0.50     Years: 28.00     Pack years: 14.00     Types: Cigarettes     Start date: 1990     Last attempt to quit: 2018     Years since quittin.0    Smokeless tobacco: Never Used    Tobacco comment: anxious   Substance and Sexual Activity    Alcohol use: No    Drug use: No    Sexual activity: Not Currently     Partners: Male     Review of Systems   Constitutional: Positive for activity change and unexpected weight change. Negative for chills and fever.   HENT: Negative for congestion.    Eyes: Negative for visual disturbance.   Respiratory: Positive for shortness of breath. Negative for cough and wheezing.    Cardiovascular: Negative for chest pain.   Gastrointestinal: Negative for abdominal distention, abdominal pain, constipation, diarrhea, nausea and vomiting.   Skin: Negative for color change and pallor.   Neurological: Negative for light-headedness.   Psychiatric/Behavioral: Negative for agitation, behavioral problems and confusion.     Objective:     Vital Signs (Most Recent):  Temp: 98.4 °F (36.9 °C) (22 09)  Pulse: 72 (22 1623)  Resp: 19 (22 1623)  BP: 137/60 (22 1623)  SpO2: 95 % (22 1554) Vital Signs (24h Range):  Temp:  [98.4 °F (36.9 °C)] 98.4 °F (36.9 °C)  Pulse:  [] 72  Resp:  [15-24] 19  SpO2:  [91 %-100 %] 95 %  BP: (125-167)/(60-77) 137/60     Weight: 102 kg (224 lb 13.9 oz)  Body mass index is  38.6 kg/m².    Physical Exam  Vitals and nursing note reviewed.   Constitutional:       General: She is not in acute distress.     Appearance: Normal appearance. She is obese. She is not ill-appearing, toxic-appearing or diaphoretic.   HENT:      Head: Normocephalic and atraumatic.      Nose: Nose normal.      Mouth/Throat:      Mouth: Mucous membranes are moist.   Eyes:      Extraocular Movements: Extraocular movements intact.   Cardiovascular:      Rate and Rhythm: Normal rate and regular rhythm.      Pulses: Normal pulses.      Heart sounds: Normal heart sounds. No murmur heard.      Pulmonary:      Effort: Pulmonary effort is normal. No respiratory distress.      Breath sounds: Normal breath sounds. No wheezing or rales.   Abdominal:      General: Abdomen is flat. Bowel sounds are normal. There is no distension.      Tenderness: There is no abdominal tenderness.   Musculoskeletal:         General: No swelling or tenderness. Normal range of motion.      Cervical back: Normal range of motion.   Skin:     General: Skin is warm.      Coloration: Skin is not jaundiced or pale.   Neurological:      Mental Status: She is alert and oriented to person, place, and time. Mental status is at baseline.      Comments: Somnolent but easily arousable    Psychiatric:         Mood and Affect: Mood normal.         Behavior: Behavior normal.         Thought Content: Thought content normal.             Significant Labs:   All pertinent labs within the past 24 hours have been reviewed.  BMP:   Recent Labs   Lab 02/04/22  1201   *      K 6.3*   CL 95   CO2 27   BUN 66*   CREATININE 8.4*   CALCIUM 9.3     CBC:   Recent Labs   Lab 02/04/22  1201   WBC 8.04   HGB 9.8*   HCT 32.3*        CMP:   Recent Labs   Lab 02/04/22  1201      K 6.3*   CL 95   CO2 27   *   BUN 66*   CREATININE 8.4*   CALCIUM 9.3   PROT 7.7   ALBUMIN 3.6   BILITOT 0.7   ALKPHOS 123   AST 38   ALT 28   ANIONGAP 14   EGFRNONAA 4.5*        Significant Imaging:   Imaging Results          X-Ray Chest AP Portable (Final result)  Result time 02/04/22 12:35:20    Final result by Yakelin Sanchez MD (02/04/22 12:35:20)                 Impression:      1. Near complete opacification of the left lung, worsened when compared to prior imaging.  Given the waxing and waning appearance of this process going back to 11/18/2021, cross-sectional imaging could be considered for further evaluation.  2. Radiographic findings suggestive of pulmonary edema and a small right pleural effusion.      Electronically signed by: Yakelin Sanchez  Date:    02/04/2022  Time:    12:35             Narrative:    EXAMINATION:  XR CHEST AP PORTABLE    CLINICAL HISTORY:  Shortness of breath    TECHNIQUE:  Single frontal view of the chest was performed.    COMPARISON:  11/03/2021, 11/18/2021    FINDINGS:  There is near complete opacification of the left lung that is new/worsened when compared to 11/18/2021.  The pulmonary vasculature is prominent and indistinct, new from prior.  Heart size is unchanged.  Small right pleural effusion is present.

## 2022-02-04 NOTE — ASSESSMENT & PLAN NOTE
Nephrology History  iHD Schedule: TTS  Unit/MD: Dr. Kong at [facility].   Duration: 4 hrs  EDW: ?  Access: RFA AVF   Resodial Renal Function: Anuric     Assessment:   - Will provide dialysis for metabolic clearance and volume management today. .   - Dialysate adjusted to current labs   - Will repeat HD treatment tomorrow per OP schedule for ongoing volume removal and clearance.   - Will obtain OP dialysis records  - Continue to monitor intake and output, daily weights   - Avoid nephrotoxic medication and renal dose medications to GFR  - Will continue to monitor    Anemia of Chronic Kidney Disease   - Hgb 9.8 .Goal in ESRD is Hgb of 10-11.   - EPO can be administered and dosed per his OP unit upon discharge.    Mineral Bone Disease in CKD   - Recommend renal diet.  - Novasource with meals  - Continue home phos binder   - Daily renal panel so that phos and albumin is monitored daily.

## 2022-02-04 NOTE — ASSESSMENT & PLAN NOTE
Continue home citalopram   How Severe Is Your Skin Lesion?: moderate Has Your Skin Lesion Been Treated?: not been treated Is This A New Presentation, Or A Follow-Up?: Skin Lesions Is This A New Presentation, Or A Follow-Up?: Skin Lesion

## 2022-02-04 NOTE — ASSESSMENT & PLAN NOTE
Baseline Hgb 9-10. Secondary to anemia of chronic disease.  Hgb 8.4 on admission    - Will discuss EPO administration with nephrology as Hgb <10  - Iron studies  - Daily CBC

## 2022-02-04 NOTE — SUBJECTIVE & OBJECTIVE
Past Medical History:   Diagnosis Date    Anxiety     Breast cancer     left    Carotid artery disease     Cataract     Choledocholithiasis     s/p ERCP and stent placement    Chronic diastolic CHF (congestive heart failure)     Chronic obstructive pulmonary disease     Chronic venous insufficiency     Depression     End-stage renal disease on hemodialysis     M/W/F    GERD (gastroesophageal reflux disease)     History of breast cancer     History of colon cancer 2001    s/p sigmoid colectomy    History of kidney stones     History of osteomyelitis of left foot     History of pancreatitis     History of stroke     Hyperlipidemia     Hypertension     Intracerebral hemorrhage     Lumbar degenerative disc disease     Lumbar spinal stenosis     Morbid obesity     Paroxysmal atrial fibrillation     Peripheral artery disease     Peripheral neuropathy     Tobacco dependence     Type II diabetes mellitus     Urinary incontinence        Past Surgical History:   Procedure Laterality Date    ANGIOGRAPHY OF LOWER EXTREMITY Right 9/17/2020    Procedure: Angiogram Extremity Unilateral;  Surgeon: RICK Pollard III, MD;  Location: [facility] OR [address] [place] [address];  Service: Peripheral Vascular;  Laterality: Right;  6.2 minutes of fluro  690.88 mGy  112.64 Gycm2  55 ml    ANGIOGRAPHY OF LOWER EXTREMITY Left 5/13/2021    Procedure: Angiogram Extremity Unilateral;  Surgeon: HOMERO Hayden II, MD;  Location: [facility] OR [address] [place] [address];  Service: Vascular;  Laterality: Left;  35.1 min  971.41 mGy  190.27 Gy.cm  101ml Dye    ANGIOGRAPHY OF LOWER EXTREMITY Right 7/19/2021    Procedure: Angiogram Extremity Unilateral;  Surgeon: HOMERO Hayden II, MD;  Location: [facility] OR [address] [place] [address];  Service: Vascular;  Laterality: Right;  3.0 min  121.00 mGy  26.8713 Gy.cm  13ml Dye    AORTOGRAPHY N/A 5/13/2021    Procedure: AORTOGRAM;  Surgeon: HOMERO Hayden II, MD;  Location: [facility] OR [address] [place] [address];  Service: Vascular;  Laterality: N/A;    AV  FISTULA PLACEMENT Right 5/28/2018    Procedure: CREATION -FISTULA-AV;  Surgeon: RICK Pollard III, MD;  [facility];  Service: Peripheral Vascular;  Laterality: Right;    BREAST BIOPSY  1/2012    left breast invasive mammary carcinoma (lateral bx) and intraductal papilloma (medial bx)    CARDIOVERSION  11/22/2021    CARDIOVERSION  11/22/2021    Procedure: Cardioversion;  Surgeon: Ad Garcia MD;  Location: [facility] CATH LAB;  Service: Cardiology;;    CATARACT EXTRACTION W/  INTRAOCULAR LENS IMPLANT Right 1/24/2019        CATARACT EXTRACTION W/  INTRAOCULAR LENS IMPLANT Left 1/31/2019    Procedure: EXTRACTION, CATARACT, WITH IOL INSERTION;  Surgeon: Immanuel Moncada MD;  Location: [facility] OR;  Service: Ophthalmology;  Laterality: Left;    CHOLECYSTECTOMY  2001    DEBRIDEMENT OF FOOT Right 2/17/2021    Procedure: DEBRIDEMENT, FOOT right plantar ulcer;  Surgeon: Sonja Corral DPM;  Location: [facility] OR;  Service: Podiatry;  Laterality: Right;    ERCP W/ SPHICTEROTOMY      FINE NEEDLE ASPIRATION  1999    Right breast    FOOT AMPUTATION Left 6/1/2021    Procedure: Transmetatarsal amputation;  Surgeon: Billy Robles DPM;  [facility];  Service: Podiatry;  Laterality: Left;    FOOT AMPUTATION Left 7/23/2021    Procedure: AMPUTATION, FOOT;  Surgeon: Billy Robles DPM;  [facility];  Service: Podiatry;  Laterality: Left;    FOOT AMPUTATION THROUGH METATARSAL Right 10/15/2020    Procedure: PARTIAL RAY AMPUTATION GREAT TOE;  Surgeon: Billy Robles DPM;  [facility];  Service: Podiatry;  Laterality: Right;  Stretcher OK    HERNIA REPAIR      LAPAROSCOPIC NEPHRECTOMY Left 2011    MASTECTOMY  2013    left    OOPHORECTOMY Left 2001    REVISION OF ARTERIOVENOUS FISTULA Right 8/15/2018    Procedure: REVISION, AV FISTULA;  Surgeon: RICK Pollard III, MD;  [facility];  Service: Peripheral Vascular;  Laterality: Right;    SIGMOIDECTOMY   2001    SURGICAL REMOVAL OF METATARSAL HEAD Right 2/17/2021    Procedure: OSTECTOMY, METATARSAL BONE, diatal 1st;  Surgeon: Sonja Corral DPM;  Location: [facility] OR;  Service: Podiatry;  Laterality: Right;    TOE AMPUTATION Left 5/13/2021    Procedure: AMPUTATION, TOE;  Surgeon: HOMERO Hayden II, MD;  [facility];  Service: Vascular;  Laterality: Left;    TOTAL REDUCTION MAMMOPLASTY Right 2013       Review of patient's allergies indicates:   Allergen Reactions    Cyclobenzaprine Hallucinations    Erythromycin      Stomach upset    Keflex [cephalexin]      Yeast infection    Erythromycin base      Other reaction(s): upset stomach       No current facility-administered medications on file prior to encounter.     Current Outpatient Medications on File Prior to Encounter   Medication Sig    acetaminophen-codeine 300-30mg (TYLENOL #3) 300-30 mg Tab Take 1 tablet by mouth every 8 (eight) hours as needed.    albuterol (PROVENTIL/VENTOLIN HFA) 90 mcg/actuation inhaler Inhale 2 puffs into the lungs every 6 (six) hours as needed for Wheezing. Rescue    amiodarone (PACERONE) 200 MG Tab Take 1 tablet (200 mg total) by mouth once daily. (Patient not taking: Reported on 1/11/2022)    amLODIPine (NORVASC) 10 MG tablet Take 1 tablet (10 mg total) by mouth once daily.    atorvastatin (LIPITOR) 40 MG tablet Take 1 tablet (40 mg total) by mouth once daily.    atorvastatin (LIPITOR) 40 MG tablet 1 tablet DAILY (route: oral)    benzonatate (TESSALON) 100 MG capsule Take 1 capsule (100 mg total) by mouth 3 (three) times daily as needed for Cough. (Patient not taking: Reported on 1/11/2022)    blood sugar diagnostic Strp 1 strip by Misc.(Non-Drug; Combo Route) route 4 (four) times daily.    blood-glucose sensor (DEXCOM G6 SENSOR) Umm 1 each by Misc.(Non-Drug; Combo Route) route every 10 days.    blood-glucose transmitter (DEXCOM G6 TRANSMITTER) Umm 1 Device by Misc.(Non-Drug; Combo Route) route continuous.     carvediloL (COREG) 12.5 MG tablet Take 1 tablet (12.5 mg total) by mouth 2 (two) times daily with meals.    citalopram (CELEXA) 20 MG tablet Take 1 tablet (20 mg total) by mouth nightly.    cloNIDine (CATAPRES) 0.1 MG tablet 1 tablet EVERY 8 HOURS (route: oral)    clopidogreL (PLAVIX) 75 mg tablet Take 1 tablet (75 mg total) by mouth once daily.    famotidine (PEPCID) 20 MG tablet Take 1 tablet (20 mg total) by mouth nightly as needed.    fish oil-omega-3 fatty acids 300-1,000 mg capsule Take 1 capsule by mouth every morning.    flash glucose scanning reader (FREESTYLE LULU 14 DAY READER) Misc Use as directed to check blood sugars    flash glucose scanning reader (FREESTYLE LULU 2 READER) Misc 1 Units by Misc.(Non-Drug; Combo Route) route continuous prn.    flash glucose sensor (FREESTYLE LULU 14 DAY SENSOR) Kit Use as directed to check blood sugars    flash glucose sensor (FREESTYLE LULU 2 SENSOR) Kit 1 Units by Misc.(Non-Drug; Combo Route) route every 14 (fourteen) days.    gabapentin (NEURONTIN) 300 MG capsule Take 1 capsule (300 mg total) by mouth every evening.    guaiFENesin (MUCINEX) 600 mg 12 hr tablet Take 1 tablet (600 mg total) by mouth 2 (two) times daily.    hydrALAZINE (APRESOLINE) 50 MG tablet Take 1 tablet (50 mg total) by mouth every 8 (eight) hours. (Patient taking differently: Take 100 mg by mouth 3 (three) times daily.)    insulin aspart U-100 (NOVOLOG) 100 unit/mL (3 mL) InPn pen Inject 3 Units into the skin 3 (three) times daily with meals.    insulin detemir U-100 (LEVEMIR FLEXTOUCH) 100 unit/mL (3 mL) SubQ InPn pen Inject 4 Units into the skin 2 (two) times daily.    lancets (ONETOUCH DELICA LANCETS) 33 gauge Misc 1 lancet by Misc.(Non-Drug; Combo Route) route 4 (four) times daily before meals and nightly.    letrozole (FEMARA) 2.5 mg Tab Take 1 tablet (2.5 mg total) by mouth nightly.    methocarbamoL (ROBAXIN) 500 MG Tab Take 500 mg by mouth 3 (three) times daily.     senna-docusate 8.6-50 mg (PERICOLACE) 8.6-50 mg per tablet Take 1 tablet by mouth 2 (two) times daily.    sevelamer carbonate (RENVELA) 800 mg Tab Take 2 tablets (1,600 mg total) by mouth 3 (three) times daily with meals.     Family History     Problem Relation (Age of Onset)    Arthritis Mother    Breast cancer Maternal Grandmother    Cancer Maternal Grandmother, Maternal Aunt    Celiac disease Sister    Diabetes Father    Heart disease Mother, Father, Maternal Grandmother        Tobacco Use    Smoking status: Current Some Day Smoker     Packs/day: 0.50     Years: 28.00     Pack years: 14.00     Types: Cigarettes     Start date: 1990     Last attempt to quit: 2018     Years since quittin.0    Smokeless tobacco: Never Used    Tobacco comment: anxious   Substance and Sexual Activity    Alcohol use: No    Drug use: No    Sexual activity: Not Currently     Partners: Male     Review of Systems   Unable to perform ROS: acuity of condition     Objective:     Vital Signs (Most Recent):  Temp: 98.4 °F (36.9 °C) (22 0927)  Pulse: 78 (22 1423)  Resp: 20 (22 1423)  BP: 125/60 (22 1423)  SpO2: 100 % (22 1423) Vital Signs (24h Range):  Temp:  [98.4 °F (36.9 °C)] 98.4 °F (36.9 °C)  Pulse:  [] 78  Resp:  [18-24] 20  SpO2:  [91 %-100 %] 100 %  BP: (125-167)/(60-77) 125/60     Weight: 102 kg (224 lb 13.9 oz)  Body mass index is 38.6 kg/m².    SpO2: 100 %  O2 Device (Oxygen Therapy): nasal cannula    No intake or output data in the 24 hours ending 22 1521    Lines/Drains/Airways     Drain                 Hemodialysis AV Fistula Right forearm -- days         Hemodialysis AV Fistula 18 1436  1348 days          Airway                 Airway - Non-Surgical 21 1308 Nasal Cannula 74 days          Peripheral Intravenous Line                 Peripheral IV - Single Lumen 22 1358 22 G Left Upper Arm <1 day         Peripheral IV - Single Lumen 22 20 G Left  Antecubital <1 day                Physical Exam  Constitutional:       Appearance: She is obese.   HENT:      Mouth/Throat:      Mouth: Mucous membranes are moist.   Eyes:      Pupils: Pupils are equal, round, and reactive to light.   Cardiovascular:      Rate and Rhythm: Normal rate and regular rhythm.   Pulmonary:      Breath sounds: Rales present.      Comments: Increased effort with NC in place  Abdominal:      General: There is distension.   Musculoskeletal:      Right lower leg: Edema present.      Left lower leg: Edema present.   Neurological:      Comments: Lethargic and not answering questions         Significant Labs:   Recent Lab Results       02/04/22  1336   02/04/22  1305   02/04/22  1210   02/04/22  1201        Albumin       3.6       Alkaline Phosphatase       123       Allens Test     N/A         ALT       28       Anion Gap       14       AST       38       Baso #       0.03       Basophil %       0.4       BILIRUBIN TOTAL       0.7  Comment: For infants and newborns, interpretation of results should be based  on gestational age, weight and in agreement with clinical  observations.    Premature Infant recommended reference ranges:  Up to 24 hours.............<8.0 mg/dL  Up to 48 hours............<12.0 mg/dL  3-5 days..................<15.0 mg/dL  6-29 days.................<15.0 mg/dL         BNP       2,004  Comment: Values of less than 100 pg/ml are consistent with non-CHF populations.       Site     Other         BUN       66       Calcium       9.3       Chloride       95       CO2       27       Creatinine       8.4       DelSys     Room Air         Differential Method       Automated       eGFR if        5.1       eGFR if non        4.5  Comment: Calculation used to obtain the estimated glomerular filtration  rate (eGFR) is the CKD-EPI equation.          Eos #       0.0       Eosinophil %       0.1       Glucose       159       Gran # (ANC)       7.0       Gran  %       87.6       Hematocrit       32.3       Hemoglobin       9.8       Immature Grans (Abs)       0.02  Comment: Mild elevation in immature granulocytes is non specific and   can be seen in a variety of conditions including stress response,   acute inflammation, trauma and pregnancy. Correlation with other   laboratory and clinical findings is essential.         Immature Granulocytes       0.2       Lymph #       0.6       Lymph %       7.6       MCH       28.1       MCHC       30.3       MCV       93       Mode     SPONT         Mono #       0.3       Mono %       4.1       MPV       10.1       nRBC       0       Platelets       218       POC BE     8         POC HCO3     32.7         POC PCO2     55.8         POC PH     7.376         POC PO2     33         POC SATURATED O2     60         POC TCO2     34         POCT Glucose 148             Potassium       6.3  Comment: *Critical value notification by KTK with confirmation of receipt to  Edelmira Banuelos RN at Date 02/04/22 Time 13:04         PROTEIN TOTAL       7.7        Acceptable   Yes           RBC       3.49       RDW       19.5       Sample     VENOUS         SARS-CoV-2 RNA, Amplification, Qual   Negative           Sodium       136       Troponin I       3.141  Comment: The reference interval for Troponin I represents the 99th percentile   cutoff   for our facility and is consistent with 3rd generation assay   performance.         WBC       8.04             Significant Imaging: Echocardiogram:   2D echo with color flow doppler:   Results for orders placed or performed during the hospital encounter of 04/19/18   2D echo with color flow doppler   Result Value Ref Range    EF + QEF 51 55 - 65    Diastolic Dysfunction No     Narrative    Date of Procedure: 04/20/2018        TEST DESCRIPTION   Technical Quality: This is a technically challenging study.     Aorta: The aortic root is normal in size, measuring 2.5 cm at sinotubular junction.      Left Atrium: The left atrial volume index is normal, measuring 25.32 cc/m2.     Left Ventricle: The left ventricle is normal in size, with an end-diastolic diameter of 4.7 cm, and an end-systolic diameter of 3.5 cm. Posterior wall thickness is mildly increased, with the septum measuring 1.1 cm and the posterior wall measuring 1.4 cm   across. Relative wall thickness was increased at 0.60, and the LV mass index was increased at 111.6 g/m2 consistent with concentric left ventricular hypertrophy. There are no regional wall motion abnormalities. Left ventricular systolic function appears   low normal to mildly depressed. Visually estimated ejection fraction is 50-55%. The LV Doppler derived stroke volume equals 79.0 ccs.     Diastolic indices: E/A ratio 1.1,  msec., Diastolic function is abnormal.     Right Atrium: The right atrium is normal in size, measuring 4.6 cm in length and 3.7 cm in width in the apical view.     Right Ventricle: The right ventricle is normal in size. Global right ventricular systolic function appears normal. Tricuspid annular plane systolic excursion (TAPSE) is 2.2 cm.     Aortic Valve:  The aortic valve is not well seen. The mean gradient obtained across the aortic valve is 5 mmHg. Using a left ventricular outflow tract diameter of 1.9 cm, a left ventricular outflow tract velocity time integral of 27 cm, and a peak   instantaneous transvalvular velocity time integral of 35 cm, the calculated aortic valve area is 2.24 cm2(AVAi is 0.94 cm2/m2).     Mitral Valve:  The mitral valve is normal in structure. The pressure half time is 72 msec. The calculated mitral valve area is 3.06 cm2.     Tricuspid Valve:  The tricuspid valve is normal in structure.     Pulmonary Valve:  The pulmonic valve is not well seen.     IVC: The IVC is not visualized.     Intracavitary: There is no evidence of pericardial effusion, intracavity mass, thrombi, or vegetation.         CONCLUSIONS     1 - Concentric  hypertrophy.     2 - Low normal to mildly depressed left ventricular systolic function (EF 50-55%).     3 - Left ventricular diastolic dysfunction.     4 - Normal right ventricular systolic function .     5 - Technically difficult study.           This document has been electronically    SIGNED BY: Min Reis MD On: 04/20/2018 18:27    This document was originally electronically signed on: 04/20/2018 18:16    and Transthoracic echo (TTE) complete (Cupid Only):   Results for orders placed or performed during the hospital encounter of 11/18/21   Echo   Result Value Ref Range    AV mean gradient 8 mmHg    Ao peak colten 2.00 m/s    Ao VTI 44.45 cm    IVS 1.18 (A) 0.6 - 1.1 cm    LA size 3.86 cm    LVIDd 5.13 3.5 - 6.0 cm    LVIDs 3.93 2.1 - 4.0 cm    LVOT diameter 1.95 cm    LVOT peak VTI 18.17 cm    Posterior Wall 0.92 0.6 - 1.1 cm    E wave deceleration time 196.66 msec    RVDD 2.38 cm    TR Max Colten 2.75 m/s    Ao root annulus 3.15 cm    AORTIC VALVE CUSP SEPERATION 1.88 cm    MV stenosis pressure 1/2 time 57.03 ms    LV Diastolic Volume 125.51 mL    LV Systolic Volume 67.15 mL    LVOT peak colten 0.78 m/s    TDI LATERAL 0.09 m/s    TDI SEPTAL 0.08 m/s    FS 23 %    LV mass 202.72 g    Left Ventricle Relative Wall Thickness 0.36 cm    AV valve area 1.22 cm2    AV Velocity Ratio 0.39     AV index (prosthetic) 0.41     MV valve area p 1/2 method 3.86 cm2    Mean e' 0.09 m/s    LVOT area 3.0 cm2    LVOT stroke volume 54.24 cm3    AV peak gradient 16 mmHg    LV Systolic Volume Index 34.4 mL/m2    LV Diastolic Volume Index 64.36 mL/m2    LV Mass Index 104 g/m2    Triscuspid Valve Regurgitation Peak Gradient 30 mmHg    BSA 2.06 m2    Right Atrial Pressure (from IVC) 8 mmHg    EF 60 %    TV rest pulmonary artery pressure 38 mmHg    Narrative    · The left ventricle is normal in size with mild eccentric hypertrophy and   normal systolic function.  · Atrial fibrillation observed.  · The estimated ejection fraction is 60%.  ·  Normal right ventricular size with normal right ventricular systolic   function.  · Severe left atrial enlargement.  · Mild tricuspid regurgitation.  · There is mild-to-moderate aortic valve stenosis.  · Aortic valve area is 1.22 cm2; peak velocity is 2.00 m/s; mean gradient   is 8 mmHg.  · Intermediate central venous pressure (8 mmHg).  · The estimated PA systolic pressure is 38 mmHg.

## 2022-02-05 PROBLEM — I21.4 NSTEMI (NON-ST ELEVATED MYOCARDIAL INFARCTION): Status: ACTIVE | Noted: 2022-01-01

## 2022-02-05 NOTE — PLAN OF CARE
OT evaluation completed, POC established as appropriate. OT recommending home health may progress once medically stable for discharge.    Problem: Occupational Therapy Goal  Goal: Occupational Therapy Goal  Description: Goals set on 2/5, with expiration date 2/19:  Patient will increase functional independence with ADLs by performing:    Bed mobility with [place]  Grooming while standing at sink with [place]  UB Dressing with [place].  LB Dressing with [place].  Toileting from toilet with [place] for hygiene and clothing management.   Functional mobility of household and community distance with Mod-[place] and AD as needed  Pt will demonstrate understanding of education provided regarding energy conservation and task modification through teach-back method.    Outcome: Ongoing, Progressing

## 2022-02-05 NOTE — HOSPITAL COURSE
Patient was admitted for hypervolemia in the setting of missed dialysis session and hyperkalemia of 6.3. Cardiology was consulted for elevated troponin (up to 3) on admit day and deemed this secondary to NSTEMI in the setting of missed HD. ECG revealed non specific ST-T wave changes. She susbequently underwent HD with removal of 3.1L. Troponin levels continued to uptrend, peaked at 7.4 and trended down to 6.78. Cardiology was re-consulted and felt it wasn't due to acute coronary syndrome at that time. Repeat ECG didn't reveal ischemic changes. TTE revealed abnormal wall motion abnormalities in the LAD and Lcx territory with a drop in EF from 60% to 40%. Initiated on ACS protocol, pending eval for interventional cardiology for [facility].    To relieve and prevent worsening symptoms associated with congestive heart failure, to improve quality of life, and to treat underlying conditions such as coronary heart disease, high blood pressure, or diabetes, and to maintain euvolemia. Low salt diet, fluid restriction to 1500 ml daily, monitor your fluid intake and weight daily, exercise as tolerated 30 minutes daily, and follow up with your physician within 1 to 2 weeks. To be asymptomatic, to reduce risks factors such as hypertension, diabetes and hyperlipidemia to lower the risk of blood clots formation; and to prevent complications of coronary artery disease such as worsening chest pain, heart attack and death. Continue aspirin and Plavix, do not stop unless instructed by your physician.  Continue low salt, fat, cholesterol and carbohydrate diet. Follow up with cardiologist and primary care physician's routine appointment. To maintain a normal blood glucose level and HgA1C level < 5.7 and to prevent diabetic complications such as uncontrolled diabetes, diabetic coma, blindness, renal failure, and amputations. Monitor finger sticks pre-meal and bedtime, low salt, fat and carbohydrate diet, minimize glucose intake.  Exercise daily for at least 30 minutes and weight loss.  Follow up with primary care physician and endocrinologist for routine Hemoglobin A1C checks and management.  Follow up with your ophthalmologist for routine yearly vision exams. To prevent acid reflux, heartburn and chest pain. Avoid fatty, fried foods, acidic foods such as tomatoes, lime and chocolate. Continue to take your medications as prescribed, exercise daily and weight loss. To maintain normal cholesterol levels to prevent stroke, coronary artery disease, peripheral vascular disease and heart attacks. Low fat diet, exercise daily and continue current medications. Follow up with primary care physician and cardiologist for management. To improve quality of life and reduce mortality by preventing fluid overload and electrolytes abnormalities, and blood pressure control. Please follow up with your nephrologist for management, and continue your schedule hemodialysis.

## 2022-02-05 NOTE — PT/OT/SLP EVAL
"Occupational Therapy   Evaluation and Treatment    Name: Kylie King  MRN: [id number]  Admitting Diagnosis:  Acute on chronic respiratory failure    Length of Stay: 0 days    Recommendations:     Discharge Recommendations: home with home health  Discharge Equipment Recommendations:  none  Barriers to discharge:  None    Plan:     Patient to be seen 3 x/week to address the above listed problems via self-care/home management,therapeutic activities,therapeutic exercises  · Plan of Care Expires: 03/07/22  · Plan of Care Reviewed with: patient    Assessment:     Kylie King is a 66 y.o. female with a medical diagnosis of Acute on chronic respiratory failure.  She presents with the following performance deficits affecting function: weakness,impaired endurance,impaired self care skills,impaired functional mobilty,gait instability,impaired balance,decreased safety awareness,decreased lower extremity function,impaired coordination,impaired cardiopulmonary response to activity.      Pt presenting with decreased activity tolerance, impaired endurance, increased weakness, impaired balance and decreased safety awarness upon evaluation this date, requiring increased time and assistance. Patient completed sit>stand transfer with SBA and functional mobility ~5 steps with CGA using RW. Patient observed with impaired balance, reports not walking in past ~3 days. Patient is eager and motivated to return to independence. Pt would benefit from continued acute skilled OT services at this time to increase functional performance, and improve quality of life. OT to recommend HHOT (may progress) at discharge once medically appropriate for increased functional independence and to improve patient safety before returning home.    Rehab Prognosis: Good; patient would benefit from acute skilled OT services to address these deficits and reach maximum level of function.       Subjective   Patient states: " I haven't walked in 3 days. I " "can't go too far. "    Communicated with: Nurse prior to session.  Patient found sitting edge of bed with telemetry,pulse ox (continuous),oxygen upon OT entry to room.    Chief Complaint: Shortness of Breath (Hx of COPD)    Patient/Family Comments/goals: to return home at [facility]    Pain/Comfort:  · Pain Rating 1: 0/10  · Pain Rating Post-Intervention 1: 0/10    Patients cultural, spiritual, Jehovah's witness conflicts given the current situation: no    Occupational Profile:  Living Environment: lives alone in [facility] [address] B rails; walk-in shower with shower chair  Prior Level of Function: Patient reports being Mod-Independent using RW with mobility & with ADLs. Uses 2L of oxygen as needed at home. Has friends that come over during morning and night to assist with house chores/cleaning as needed  Patient uses DME as follows: oxygen,shower chair,rollator,grab bar,walker, rolling.   DME owned (not currently used): none.  Roles/Repsonsibilities:   Hand Dominance: right   Work: no.   Drive: no.   Managing Medicines/Managing Home: yes.   Equipment Used at Home:  oxygen,shower chair,rollator,grab bar,walker, rolling    Patient reports they will have assistance from friends upon discharge.      Objective:     Patient found with: telemetry,pulse ox (continuous),oxygen   General Precautions: Standard, Cardiac     Orthopedic Precautions:N/A   Braces: N/A   Oxygen Device: Nasal Cannula 3L  Vitals: /63 (BP Location: Left leg, Patient Position: Lying)   Pulse 73   Temp 98.1 °F (36.7 °C) (Oral)   Resp 18   Ht 5' 4" (1.626 m)   Wt 102.5 kg (226 lb)   SpO2 98%   BMI 38.79 kg/m²     Cognitive and Psychosocial Function:   · AxOx4 -- Person, Place, Time and Situation   · Follows Commands/attention:follows two-step commands  · Communication:  clear/fluent  · Memory: No Deficits noted  · Safety awareness/insight to disability: impaired   · Mood/Affect/Coping skills/emotional control: Appropriate to situation    Hearing: " Intact    Vision:  Intact visual fields    Physical Exam:  Postural examination/scapula alignment:    -       No postural abnormalities identified  Skin integrity: Visible skin intact     Left UE Right UE   UE Edema absent absent   UE ROM AROM WFL AROM WFL   UE Strength 4/5 4/5    Strength grasp WFL grasp WFL   Sensation LUE INTACT:WFL RUE INTACT: WFL   Fine Motor Coordination:  LUE INTACT: WFL RUE INTACT: WFL   Gross Motor Coordination: LUE INTACT: WFL RUE INTACT:WFL     Occupational Performance:  Bed Mobility:    · Not assessed, seated EOB    Functional Mobility/Transfers:   Static Sitting EOB: [place]   Patient completed Sit <> Stand Transfer from EOB with stand by assistance  with  rolling walker    Static Standing Balance: CGA   Pt completed functional mobility of ~5 steps with CGA using RW. Required increased time      Activities of Daily Living:  · Feeding:  independence to eat lunch seated EOB  · Grooming: declined to perform  · Lower Body Dressing: stand by assistance to don one R sock; Max A to don L sock    AMPAC 6 Click ADL:  AMPAC Total Score: 21    Treatment & Education:  -Pt education on OT role and POC.  -Importance of E/OOB activity with staff assistance, emphasis on daily participation  -Safety during functional transfer and mobility ensured  -Patient provided with education on importance of Bilateral UB/LB integration during functional tasks for improvement in functional performance.   -Education provided/reviewed, questions answered within OT scope of practice.   -Patient demonstrates understanding and learning this date.         Patient left sitting edge of bed with all lines intact and call button in reach    GOALS:   Multidisciplinary Problems     Occupational Therapy Goals        Problem: Occupational Therapy Goal    Goal Priority Disciplines Outcome Interventions   Occupational Therapy Goal     OT, PT/OT Ongoing, Progressing    Description: Goals set on 2/5, with expiration date  2/19:  Patient will increase functional independence with ADLs by performing:    Bed mobility with [place]  Grooming while standing at sink with [place]  UB Dressing with [place].  LB Dressing with [place].  Toileting from toilet with [place] for hygiene and clothing management.   Functional mobility of household and community distance with Mod-[place] and AD as needed  Pt will demonstrate understanding of education provided regarding energy conservation and task modification through teach-back method.                     History:     Past Medical History:   Diagnosis Date    Anxiety     Breast cancer     left    Carotid artery disease     Cataract     Choledocholithiasis     s/p ERCP and stent placement    Chronic diastolic CHF (congestive heart failure)     Chronic obstructive pulmonary disease     Chronic venous insufficiency     Depression     End-stage renal disease on hemodialysis     M/W/F    GERD (gastroesophageal reflux disease)     History of breast cancer     History of colon cancer 2001    s/p sigmoid colectomy    History of kidney stones     History of osteomyelitis of left foot     History of pancreatitis     History of stroke     Hyperlipidemia     Hypertension     Intracerebral hemorrhage     Lumbar degenerative disc disease     Lumbar spinal stenosis     Morbid obesity     Paroxysmal atrial fibrillation     Peripheral artery disease     Peripheral neuropathy     Tobacco dependence     Type II diabetes mellitus     Urinary incontinence        Past Surgical History:   Procedure Laterality Date    ANGIOGRAPHY OF LOWER EXTREMITY Right 9/17/2020    Procedure: Angiogram Extremity Unilateral;  Surgeon: RICK Pollard III, MD;  Location: [facility] OR [address] [place] [address];  Service: Peripheral Vascular;  Laterality: Right;  6.2 minutes of fluro  690.88 mGy  112.64 Gycm2  55 ml    ANGIOGRAPHY OF LOWER EXTREMITY Left 5/13/2021    Procedure: Angiogram Extremity  Unilateral;  Surgeon: HOMERO Hayden II, MD;  [facility];  Service: Vascular;  Laterality: Left;  35.1 min  971.41 mGy  190.27 Gy.cm  101ml Dye    ANGIOGRAPHY OF LOWER EXTREMITY Right 7/19/2021    Procedure: Angiogram Extremity Unilateral;  Surgeon: HOMERO Hayden II, MD;  [facility];  Service: Vascular;  Laterality: Right;  3.0 min  121.00 mGy  26.8713 Gy.cm  13ml Dye    AORTOGRAPHY N/A 5/13/2021    Procedure: AORTOGRAM;  Surgeon: HOMERO Hayden II, MD;  [facility];  Service: Vascular;  Laterality: N/A;    AV FISTULA PLACEMENT Right 5/28/2018    Procedure: CREATION -FISTULA-AV;  Surgeon: RICK Pollard III, MD;  Location: [facility] OR [address] [place] [address];  Service: Peripheral Vascular;  Laterality: Right;    BREAST BIOPSY  1/2012    left breast invasive mammary carcinoma (lateral bx) and intraductal papilloma (medial bx)    CARDIOVERSION  11/22/2021    CARDIOVERSION  11/22/2021    Procedure: Cardioversion;  Surgeon: Ad Garcia MD;  Location: [facility] CATH LAB;  Service: Cardiology;;    CATARACT EXTRACTION W/  INTRAOCULAR LENS IMPLANT Right 1/24/2019        CATARACT EXTRACTION W/  INTRAOCULAR LENS IMPLANT Left 1/31/2019    Procedure: EXTRACTION, CATARACT, WITH IOL INSERTION;  Surgeon: Immanuel Moncada MD;  Location: [facility] OR;  Service: Ophthalmology;  Laterality: Left;    CHOLECYSTECTOMY  2001    DEBRIDEMENT OF FOOT Right 2/17/2021    Procedure: DEBRIDEMENT, FOOT right plantar ulcer;  Surgeon: Sonja Corral DPM;  Location: [facility] OR;  Service: Podiatry;  Laterality: Right;    ERCP W/ SPHICTEROTOMY      FINE NEEDLE ASPIRATION  1999    Right breast    FOOT AMPUTATION Left 6/1/2021    Procedure: Transmetatarsal amputation;  Surgeon: Billy Robles DPM;  Location: [address];  Service: Podiatry;  Laterality: Left;    FOOT AMPUTATION Left 7/23/2021    Procedure: AMPUTATION, FOOT;  Surgeon: Billy Robles DPM;  Location: [facility] OR [address] [place] [address];  Service: Podiatry;   Laterality: Left;    FOOT AMPUTATION THROUGH METATARSAL Right 10/15/2020    Procedure: PARTIAL RAY AMPUTATION GREAT TOE;  Surgeon: Billy Robles DPM;  Location: [facility] OR [address] [place] [address];  Service: Podiatry;  Laterality: Right;  Stretcher OK    HERNIA REPAIR      LAPAROSCOPIC NEPHRECTOMY Left 2011    MASTECTOMY  2013    left    OOPHORECTOMY Left 2001    REVISION OF ARTERIOVENOUS FISTULA Right 8/15/2018    Procedure: REVISION, AV FISTULA;  Surgeon: RICK Pollard III, MD;  Location: [facility] OR [address] [place] [address];  Service: Peripheral Vascular;  Laterality: Right;    SIGMOIDECTOMY  2001    SURGICAL REMOVAL OF METATARSAL HEAD Right 2/17/2021    Procedure: OSTECTOMY, METATARSAL BONE, diatal 1st;  Surgeon: Sonja Corral DPM;  Location: [facility];  Service: Podiatry;  Laterality: Right;    TOE AMPUTATION Left 5/13/2021    Procedure: AMPUTATION, TOE;  Surgeon: HOMERO Hayden II, MD;  Location: [facility] OR [address] [place] [address];  Service: Vascular;  Laterality: Left;    TOTAL REDUCTION MAMMOPLASTY Right 2013       Time Tracking:       OT Date of Treatment: 02/05/22  OT Start Time: 1220  OT Stop Time: 1233  OT Total Time (min): 13 min    Billable Minutes:Evaluation 5  Therapeutic Activity 8      2/5/2022

## 2022-02-05 NOTE — PROGRESS NOTES
[facility] - Telemetry Stepdown ([place] [address])  [facility] Medicine  Progress Note    Patient Name: Kylie King  MRN: [id number]  Patient Class: OP- Observation   Admission Date: 2/4/2022  Length of Stay: 0 days  Attending Physician: Ryne Martinez MD  Primary Care Provider: Virginia Foote MD        Subjective:     Principal Problem:Acute on chronic respiratory failure        HPI:  Kylie King is a 66 year old female with chronic respiratory failure (on home 2L of O2), COPD, CAD, ESRD on HD TTS w/ anemia of chronic disease, Atrial Fib (failed DCCV in Nov 2021); on eliquis and amiodarone, CVA, PVD (s/p left foot amputation on Plavix), HTN, T2DM, HLD, who presented to the emergency department for dyspnea. Patient reports she has been out of her oxygen for the past few days. She reports dyspnea started a few days ago but worsened overnight. She last had HD on Tuesday and missed her Thursday session due to feeling unwell. She denies chest pain, LE edema. She is anuric. She reports she is scheduled to undergo an RFA for her atrial fibrillation on Monday morning and was told to hold her amiodarone, although she isn't clear as to whether she has been taking the medication. She also reports taking eliquis twice daily. Of note, the patient has had multiple hospital visits (4 in the past six months). She has a history of falls for which eliquis was held, however she recently was seen by EP- Dr. Kuo who recommended she continue her eliquis  but hold it the night prior to RF-PVI and possible VIRAJ closure device.    Upon arrival to the ED, she was hemodynamically stable, placed on 2L of O2. Labs revealed K of 6.3, BNP 2004, Troponin 3.14. CXR revealed pulmonary edema and possible left sided opacification. ECG revealed non specific ST-T wave changes. Cardiology was consulted. Patient was shifted and received calcium for cardioprotection. Patient was admitted into Hospital medicine for further management.         Overview/Hospital Course:  Patient was admitted for hypervolemia in the setting of missed dialysis session and hyperkalemia of 6.3. Cardiology was consulted for elevated troponin (up to 3) on admit day and deemed this secondary to NSTEMI in the setting of missed HD. ECG revealed non specific ST-T wave changes. She susbequently underwent HD with removal of 3.1L. Troponin levels continued to uptrend, peaked at 7.4 and trended down to 6.78. Cardiology was re-consulted and felt it wasn't due to acute coronary syndrome. Repeat ECG didn't reveal ischemic changes. TTE to evaluate for wall motion abnormalities was obtained and pending.      Interval History: NAEON. Patient's troponin peaked to 7.4 but trended down to 6.78. Patient reports right sided flank pain. Denies chest pain or dyspnea. Repeat ECG was without any evidence of ischemia.     Review of Systems   Constitutional: Positive for activity change and unexpected weight change. Negative for chills and fever.   HENT: Negative for congestion.    Eyes: Negative for visual disturbance.   Respiratory: Positive for shortness of breath. Negative for cough and wheezing.    Cardiovascular: Negative for chest pain.   Gastrointestinal: Negative for abdominal distention, abdominal pain, constipation, diarrhea, nausea and vomiting.   Genitourinary: Positive for flank pain.   Skin: Negative for color change and pallor.   Neurological: Negative for light-headedness.   Psychiatric/Behavioral: Negative for agitation, behavioral problems and confusion.     Objective:     Vital Signs (Most Recent):  Temp: 98.4 °F (36.9 °C) (02/05/22 0820)  Pulse: 76 (02/05/22 0820)  Resp: 19 (02/05/22 0820)  BP: 135/62 (02/05/22 0820)  SpO2: (!) 92 % (02/05/22 0820) Vital Signs (24h Range):  Temp:  [97.5 °F (36.4 °C)-98.4 °F (36.9 °C)] 98.4 °F (36.9 °C)  Pulse:  [68-85] 76  Resp:  [15-24] 19  SpO2:  [91 %-100 %] 92 %  BP: ()/(30-97) 135/62     Weight: 102.5 kg (226 lb)  Body mass index is  38.79 kg/m².    Intake/Output Summary (Last 24 hours) at 2/5/2022 1148  Last data filed at 2/4/2022 2316  Gross per 24 hour   Intake --   Output 3100 ml   Net -3100 ml      Physical Exam  Vitals and nursing note reviewed.   Constitutional:       General: She is not in acute distress.     Appearance: Normal appearance. She is obese. She is not ill-appearing, toxic-appearing or diaphoretic.   HENT:      Head: Normocephalic and atraumatic.      Nose: Nose normal.      Mouth/Throat:      Mouth: Mucous membranes are moist.   Eyes:      Extraocular Movements: Extraocular movements intact.   Cardiovascular:      Rate and Rhythm: Normal rate and regular rhythm.      Pulses: Normal pulses.      Heart sounds: Normal heart sounds. No murmur heard.      Pulmonary:      Effort: Pulmonary effort is normal. No respiratory distress.      Breath sounds: Normal breath sounds. No wheezing or rales.   Abdominal:      General: Abdomen is flat. Bowel sounds are normal. There is no distension.      Tenderness: There is no abdominal tenderness.   Musculoskeletal:         General: No swelling or tenderness. Normal range of motion.      Cervical back: Normal range of motion.   Skin:     General: Skin is warm.      Coloration: Skin is not jaundiced or pale.   Neurological:      Mental Status: She is alert and oriented to person, place, and time. Mental status is at baseline.   Psychiatric:         Mood and Affect: Mood normal.         Behavior: Behavior normal.         Thought Content: Thought content normal.         Significant Labs:   All pertinent labs within the past 24 hours have been reviewed.  BMP:   Recent Labs   Lab 02/05/22  0606   *   *   K 4.9      CO2 23   BUN 30*   CREATININE 5.3*   CALCIUM 9.3   MG 2.1     CBC:   Recent Labs   Lab 02/04/22  1201 02/05/22  0606   WBC 8.04 5.08   HGB 9.8* 9.1*   HCT 32.3* 31.0*    184     CMP:   Recent Labs   Lab 02/04/22  1201 02/05/22  0051 02/05/22  0606     --   134*   K 6.3* 4.4 4.9   CL 95  --  102   CO2 27  --  23   *  --  134*   BUN 66*  --  30*   CREATININE 8.4*  --  5.3*   CALCIUM 9.3  --  9.3   PROT 7.7  --  6.6   ALBUMIN 3.6  --  3.2*   BILITOT 0.7  --  0.6   ALKPHOS 123  --  103   AST 38  --  24   ALT 28  --  18   ANIONGAP 14  --  9   EGFRNONAA 4.5*  --  7.8*       Significant Imaging: I have reviewed all pertinent imaging results/findings within the past 24 hours.      Assessment/Plan:      * Acute on chronic respiratory failure  In the setting of hypervolemia due to missed HD session vs decompensated HF.   On home 2L of O2.    - HD for volume removal   - Wean O2 to maintain sats 88-92% given hx of COPD      Type 2 myocardial infarction  ECG with non-specific ST-T changes, troponin elevated at 3. BNP elevated in 2000s  Type 2 NSTEMI in the setting of volume overload due to missed HD session.   Repeat ECG with no evidence of ischemia.     Plan  - Cardiology consulted and not concerned for ACS in the setting of uprising troponin  - Troponin peaked at 7.4, trended down to 6.8  - TTE pending   - HD for volume removal       Atrial fibrillation  Patient with Long standing persistent (>12 months) atrial fibrillation which is controlled currently with Beta Blocker and Amiodarone. Patient is currently in atrial fibrillation.[id number] Score: 7. Anticoagulation indicated. Anticoagulation done with apixaban.     Plan  - Patient follows with EP- Dr. Kuo and is scheduled for outpatient  RF PVI with possible VIRAJ closure 02/07/2022. Discussed with cardiology to determine if patient can undergo the procedure while here.    - Will hold eliquis 02/06 night  - Off amiodarone. Continue carvedilol  - Maintain K>4, Mg>2      Anemia in ESRD (end-stage renal disease)  Baseline Hgb 9-10. Secondary to anemia of chronic disease.  Hgb 8.4 on admission    - Will discuss EPO administration with nephrology as Hgb <10  - Iron studies  - Daily CBC    Hyperkalemia  Potassium 6.3 on  admission in the setting of missed HD session  ECG without hyperkalemic changes. Received calcium  - RESOLVED with HD    Mild major depressive disorder  Continue home citalopram    End-stage renal disease on hemodialysis  On home HD TTh S. Missed HD the day prior to hospital admission.   S/p HD 02/04 (on admit day) with removal of 3L    - Nephrology following. Plan for HD today  - Daily RFP  - Avoid nephrotoxins; renally dose meds  - Renal diet.      Acute on chronic diastolic heart failure  BNP elevated at 2000s. CXR with pulmonary edema.     - Fluid removal with HD  - Low salt diet   - Fluid restriction of 1500ml daily      Type II diabetes mellitus  Controlled A1c 5.9.     - POCT glucose ACHS  - Maintain -180      Hyperlipidemia  Continue home statin      Hypertension  On home amlodipine 10mg, coreg 12.5mg BID, hydralazine 50mg TID    - Currently normotensive.   - HD as scheduled by nephrology  - Resume when appropriate      VTE Risk Mitigation (From admission, onward)         Ordered     apixaban tablet 5 mg  2 times daily         02/04/22 1508     IP VTE HIGH RISK PATIENT  Once         02/04/22 1508     Place sequential compression device  Until discontinued         02/04/22 1508     Reason for No Pharmacological VTE Prophylaxis  Once        Question:  Reasons:  Answer:  Already adequately anticoagulated on oral Anticoagulants    02/04/22 1508                Discharge Planning   UMAIR:      Code Status: Full Code   Is the patient medically ready for discharge?: No    Reason for patient still in hospital (select all that apply): Patient trending condition           Level of service 2           Derrell Watson MD  Department of Hospital Medicine   [facility] - Telemetry Stepdown ([place]-[facility])

## 2022-02-05 NOTE — ASSESSMENT & PLAN NOTE
In the setting of hypervolemia due to missed HD session vs decompensated HF.   On home 2L of O2.    - HD for volume removal   - Wean O2 to maintain sats 88-92% given hx of COPD

## 2022-02-05 NOTE — SUBJECTIVE & OBJECTIVE
Interval History: NAEON. Patient's troponin peaked to 7.4 but trended down to 6.78. Patient reports right sided flank pain. Denies chest pain or dyspnea. Repeat ECG was without any evidence of ischemia.     Review of Systems   Constitutional: Positive for activity change and unexpected weight change. Negative for chills and fever.   HENT: Negative for congestion.    Eyes: Negative for visual disturbance.   Respiratory: Positive for shortness of breath. Negative for cough and wheezing.    Cardiovascular: Negative for chest pain.   Gastrointestinal: Negative for abdominal distention, abdominal pain, constipation, diarrhea, nausea and vomiting.   Genitourinary: Positive for flank pain.   Skin: Negative for color change and pallor.   Neurological: Negative for light-headedness.   Psychiatric/Behavioral: Negative for agitation, behavioral problems and confusion.     Objective:     Vital Signs (Most Recent):  Temp: 98.4 °F (36.9 °C) (02/05/22 0820)  Pulse: 76 (02/05/22 0820)  Resp: 19 (02/05/22 0820)  BP: 135/62 (02/05/22 0820)  SpO2: (!) 92 % (02/05/22 0820) Vital Signs (24h Range):  Temp:  [97.5 °F (36.4 °C)-98.4 °F (36.9 °C)] 98.4 °F (36.9 °C)  Pulse:  [68-85] 76  Resp:  [15-24] 19  SpO2:  [91 %-100 %] 92 %  BP: ()/(30-97) 135/62     Weight: 102.5 kg (226 lb)  Body mass index is 38.79 kg/m².    Intake/Output Summary (Last 24 hours) at 2/5/2022 1148  Last data filed at 2/4/2022 2316  Gross per 24 hour   Intake --   Output 3100 ml   Net -3100 ml      Physical Exam  Vitals and nursing note reviewed.   Constitutional:       General: She is not in acute distress.     Appearance: Normal appearance. She is obese. She is not ill-appearing, toxic-appearing or diaphoretic.   HENT:      Head: Normocephalic and atraumatic.      Nose: Nose normal.      Mouth/Throat:      Mouth: Mucous membranes are moist.   Eyes:      Extraocular Movements: Extraocular movements intact.   Cardiovascular:      Rate and Rhythm: Normal rate and  regular rhythm.      Pulses: Normal pulses.      Heart sounds: Normal heart sounds. No murmur heard.      Pulmonary:      Effort: Pulmonary effort is normal. No respiratory distress.      Breath sounds: Normal breath sounds. No wheezing or rales.   Abdominal:      General: Abdomen is flat. Bowel sounds are normal. There is no distension.      Tenderness: There is no abdominal tenderness.   Musculoskeletal:         General: No swelling or tenderness. Normal range of motion.      Cervical back: Normal range of motion.   Skin:     General: Skin is warm.      Coloration: Skin is not jaundiced or pale.   Neurological:      Mental Status: She is alert and oriented to person, place, and time. Mental status is at baseline.   Psychiatric:         Mood and Affect: Mood normal.         Behavior: Behavior normal.         Thought Content: Thought content normal.         Significant Labs:   All pertinent labs within the past 24 hours have been reviewed.  BMP:   Recent Labs   Lab 02/05/22  0606   *   *   K 4.9      CO2 23   BUN 30*   CREATININE 5.3*   CALCIUM 9.3   MG 2.1     CBC:   Recent Labs   Lab 02/04/22  1201 02/05/22  0606   WBC 8.04 5.08   HGB 9.8* 9.1*   HCT 32.3* 31.0*    184     CMP:   Recent Labs   Lab 02/04/22  1201 02/05/22  0051 02/05/22  0606     --  134*   K 6.3* 4.4 4.9   CL 95  --  102   CO2 27  --  23   *  --  134*   BUN 66*  --  30*   CREATININE 8.4*  --  5.3*   CALCIUM 9.3  --  9.3   PROT 7.7  --  6.6   ALBUMIN 3.6  --  3.2*   BILITOT 0.7  --  0.6   ALKPHOS 123  --  103   AST 38  --  24   ALT 28  --  18   ANIONGAP 14  --  9   EGFRNONAA 4.5*  --  7.8*       Significant Imaging: I have reviewed all pertinent imaging results/findings within the past 24 hours.

## 2022-02-05 NOTE — PROGRESS NOTES
1630 Arrived to ED for bedside HD, per primary RN and ED charge please start HD when pt arrives to 7090    1650 Arrived to 7090 and set up for bedside HD as soon as patient arrives from ED, 7th floor RN aware that I'm here setting up for HD    1800 Pt still has not arrived to 7090, unable to begin HD at this time. Primary RN to call HD nurse as soon as patient arrives to 7090

## 2022-02-05 NOTE — ASSESSMENT & PLAN NOTE
ECG with non-specific ST-T changes, troponin elevated at 3. BNP elevated in 2000s  Type 2 NSTEMI in the setting of volume overload due to missed HD session.   Repeat ECG with no evidence of ischemia.     Plan  - Cardiology consulted and not concerned for ACS in the setting of uprising troponin  - Troponin peaked at 7.4, trended down to 6.8  - TTE pending   - HD for volume removal

## 2022-02-05 NOTE — ASSESSMENT & PLAN NOTE
On home amlodipine 10mg, coreg 12.5mg BID, hydralazine 50mg TID    - Currently normotensive.   - HD as scheduled by nephrology  - Resume when appropriate

## 2022-02-05 NOTE — ASSESSMENT & PLAN NOTE
On home HD TTh S. Missed HD the day prior to hospital admission.   S/p HD 02/04 (on admit day) with removal of 3L    - Nephrology following. Plan for HD today  - Daily RFP  - Avoid nephrotoxins; renally dose meds  - Renal diet.

## 2022-02-05 NOTE — PLAN OF CARE
No distress overnight, slept well.  On 2.5L oxygen with saturation of 93-95% overnight.  No c/o sob, or chest pain. Needs addressed.  Call bell within reach. Continue to monitor.    Problem: Gas Exchange Impaired  Goal: Optimal Gas Exchange  Outcome: Ongoing, Progressing     Problem: Electrolyte Imbalance  Goal: Electrolyte Balance  Outcome: Ongoing, Progressing     Problem: Hemodynamic Instability (Hemodialysis)  Goal: Effective Tissue Perfusion  Outcome: Ongoing, Progressing

## 2022-02-05 NOTE — ASSESSMENT & PLAN NOTE
Potassium 6.3 on admission in the setting of missed HD session  ECG without hyperkalemic changes. Received calcium  - RESOLVED with HD

## 2022-02-05 NOTE — NURSING
PT ARRIVED TO UNIT VIA STRETCHER WITH ESCORT, 129/74, 84, 18, 97.5, 95% ON 2L/NC, DINNER TRAY PROVIDED, CRESCENCIO WELL, REPORT TO BE GIVEN TO ONCOMING ADMITTING NURSE.

## 2022-02-05 NOTE — PLAN OF CARE
Problem: Adult Inpatient Plan of Care  Goal: Plan of Care Review  Outcome: Ongoing, Progressing     Problem: Adult Inpatient Plan of Care  Goal: Absence of Hospital-Acquired Illness or Injury  Outcome: Ongoing, Progressing     Problem: Adult Inpatient Plan of Care  Goal: Optimal Comfort and Wellbeing  Outcome: Ongoing, Progressing     Problem: Diabetes Comorbidity  Goal: Blood Glucose Level Within Targeted Range  Outcome: Ongoing, Progressing     Pt. AAOx4, VSS, no falls or injuries during shift, safety maintained. Call light in reach, bed locked and in lowest position, side rails up x2. Blood glucose monitored and controlled, no acute events. Walker at bedside, instructed to call for assistance verbalized understanding. Refused dialysis, provider notified. Will continue to monitor.

## 2022-02-05 NOTE — ASSESSMENT & PLAN NOTE
Patient with Long standing persistent (>12 months) atrial fibrillation which is controlled currently with Beta Blocker and Amiodarone. Patient is currently in atrial fibrillation.[id number] Score: 7. Anticoagulation indicated. Anticoagulation done with apixaban.     Plan  - Patient follows with EP- Dr. Kuo and is scheduled for outpatient  RF PVI with possible VIRAJ closure 02/07/2022. Discussed with cardiology to determine if patient can undergo the procedure while here.    - Will hold eliquis 02/06 night  - Off amiodarone. Continue carvedilol  - Maintain K>4, Mg>2

## 2022-02-05 NOTE — PROGRESS NOTES
Hemodialysis Treatment completed, plan of care was met. AVF + thrill and bruit.     02/04/22 2316 02/05/22 0007   Post-Hemodialysis Assessment   Duration of Treatment (minutes) 240 minutes  --    Total UF (mL) 3100 mL  --    Net Fluid Removal 2500  --    Patient Response to Treatment Tolerated Tx,  --    Post-Hemodialysis Comments Pt. awake and alert. Pt hx. blood thinner so hemostasis at decannulation was a challenge. Arterial took 18 mins and Venous took 16 mins.

## 2022-02-05 NOTE — PROGRESS NOTES
Hemodialysis treatment initiated as ordered, plan of care discussed with pt. Total fluid goal is 3.5L with Net goal of 3L.

## 2022-02-06 NOTE — NURSING
APTT not drawn yet, lab called about timed test at 0254, still waiting. Pt tolerating heparin well, sitting up in bed, no needs currently.  Npo since mn call bell within reach.

## 2022-02-06 NOTE — PROGRESS NOTES
[facility] - Telemetry Stepdown ([place] [address])  Cardiology  Progress Note    Patient Name: Kylie King  MRN: [id number]  Admission Date: 2/4/2022  Hospital Length of Stay: 0 days  Code Status: Full Code   Attending Physician: Ryne Martinez MD   Primary Care Physician: Virginia Foote MD  Expected Discharge Date: 2/8/2022  Principal Problem:Acute on chronic respiratory failure    Subjective:     Hospital Course:   No notes on file    Interval History: No acute events overnight. Patient reports poor sleep and some mild right flank pain. Comfortable breathing on 2L NS. VSS, afebrile.  Denies chest pain, palpitations, nausea, vomiting, HA, vision changes, diaphoresis.    Review of Systems   Constitutional: Negative for chills, diaphoresis, fever and malaise/fatigue.   HENT: Negative for congestion.    Eyes: Negative for visual disturbance.   Cardiovascular: Negative for chest pain, irregular heartbeat, leg swelling, orthopnea and palpitations.   Respiratory: Negative for cough and shortness of breath.    Gastrointestinal: Positive for abdominal pain (R flank).   Neurological: Negative for dizziness and headaches.   Psychiatric/Behavioral: Negative for altered mental status.     Objective:     Vital Signs (Most Recent):  Temp: 98.2 °F (36.8 °C) (02/06/22 0802)  Pulse: (!) 57 (02/06/22 0807)  Resp: 16 (02/06/22 0807)  BP: (!) 140/65 (02/06/22 0802)  SpO2: (!) 93 % (02/06/22 0807) Vital Signs (24h Range):  Temp:  [97.6 °F (36.4 °C)-98.7 °F (37.1 °C)] 98.2 °F (36.8 °C)  Pulse:  [57-81] 57  Resp:  [16-20] 16  SpO2:  [92 %-98 %] 93 %  BP: (128-151)/(61-80) 140/65     Weight: 102.5 kg (226 lb)  Body mass index is 38.79 kg/m².     SpO2: (!) 93 %  O2 Device (Oxygen Therapy): nasal cannula      Intake/Output Summary (Last 24 hours) at 2/6/2022 1104  Last data filed at 2/6/2022 0716  Gross per 24 hour   Intake 170.22 ml   Output --   Net 170.22 ml       Lines/Drains/Airways     Drain                 Hemodialysis AV  Fistula Right forearm -- days         Hemodialysis AV Fistula 05/28/18 1436  1349 days          Airway                 Airway - Non-Surgical 11/22/21 1308 Nasal Cannula 75 days          Peripheral Intravenous Line                 Peripheral IV - Single Lumen 02/04/22 1358 22 G Left Upper Arm 1 day                Physical Exam  Constitutional:       General: She is not in acute distress.     Appearance: She is obese. She is not ill-appearing.   HENT:      Head: Normocephalic and atraumatic.   Eyes:      Extraocular Movements: Extraocular movements intact.      Pupils: Pupils are equal, round, and reactive to light.   Cardiovascular:      Rate and Rhythm: Normal rate and regular rhythm.      Pulses: Normal pulses.      Heart sounds: No murmur heard.  No friction rub. No gallop.       Comments: Right fistula  Pulmonary:      Effort: Pulmonary effort is normal.      Breath sounds: Normal breath sounds.      Comments: NC in place  Abdominal:      General: Bowel sounds are normal. There is no distension.      Palpations: Abdomen is soft.      Tenderness: There is right CVA tenderness.   Musculoskeletal:      Right lower leg: No edema.      Left lower leg: No edema.   Skin:     General: Skin is warm and dry.   Neurological:      General: No focal deficit present.      Mental Status: She is alert and oriented to person, place, and time.   Psychiatric:         Mood and Affect: Mood normal.         Behavior: Behavior normal.         Significant Labs:   CMP   Recent Labs   Lab 02/04/22  1201 02/04/22  1201 02/05/22  0051 02/05/22  0606 02/06/22  0432     --   --  134* 137   K 6.3*   < > 4.4 4.9 5.6*   CL 95  --   --  102 105   CO2 27  --   --  23 20*   *  --   --  134* 113*   BUN 66*  --   --  30* 47*   CREATININE 8.4*  --   --  5.3* 6.7*   CALCIUM 9.3  --   --  9.3 9.4   PROT 7.7  --   --  6.6 7.2   ALBUMIN 3.6  --   --  3.2* 3.5   BILITOT 0.7  --   --  0.6 0.7   ALKPHOS 123  --   --  103 118   AST 38  --   --   24 30   ALT 28  --   --  18 27   ANIONGAP 14  --   --  9 12   ESTGFRAFRICA 5.1*  --   --  9.0* 6.8*   EGFRNONAA 4.5*  --   --  7.8* 5.9*    < > = values in this interval not displayed.   , CBC   Recent Labs   Lab 02/05/22  0606 02/05/22  0606 02/05/22 1959 02/05/22 1959 02/06/22  0432   WBC 5.08  --  5.26  --  4.75  4.75   HGB 9.1*  --  9.1*  --  9.3*  9.3*   HCT 31.0*   < > 29.6*   < > 31.9*  31.9*     --  196  --  185  185    < > = values in this interval not displayed.   , Troponin   Recent Labs   Lab 02/04/22  1646 02/05/22  0051 02/05/22  0851   TROPONINI 4.869* 7.578* 6.785*    and All pertinent lab results from the last 24 hours have been reviewed.    Significant Imaging: Echocardiogram:   Transthoracic echo (TTE) complete (Cupid Only):   Results for orders placed or performed during the hospital encounter of 02/04/22   Echo   Result Value Ref Range    Ascending aorta 2.66 cm    STJ 2.53 cm    AV mean gradient 6 mmHg    Ao peak colten 0.71 m/s    Ao VTI 32.88 cm    IVRT 74.22 msec    IVS 0.98 0.6 - 1.1 cm    LA size 3.90 cm    Left Atrium Major Axis 6.76 cm    Left Atrium Minor Axis 6.43 cm    LVIDd 5.20 3.5 - 6.0 cm    LVIDs 3.60 2.1 - 4.0 cm    LVOT diameter 1.98 cm    LVOT peak VTI 18.97 cm    Posterior Wall 0.70 0.6 - 1.1 cm    MV Peak A Colten 0.84 m/s    E wave deceleration time 124.98 msec    MV Peak E Colten 1.07 m/s    RA Major Axis 4.83 cm    RA Width 4.22 cm    RVDD 3.94 cm    Sinus 2.92 cm    TAPSE 1.68 cm    TR Max Colten 1.68 m/s    TDI LATERAL 0.04 m/s    TDI SEPTAL 0.04 m/s    LA WIDTH 4.84 cm    MV stenosis pressure 1/2 time 36.24 ms    LV Diastolic Volume 83.47 mL    LV Systolic Volume 43.31 mL    RV S' 11.08 cm/s    LVOT peak colten 0.87 m/s    LA volume (mod) 73.33 cm3    LV LATERAL E/E' RATIO 26.75 m/s    LV SEPTAL E/E' RATIO 26.75 m/s    FS 31 %    LA volume 105.75 cm3    LV mass 154.56 g    Left Ventricle Relative Wall Thickness 0.27 cm    AV valve area 1.78 cm2    AV Velocity Ratio 1.23      AV index (prosthetic) 0.58     MV valve area p 1/2 method 6.07 cm2    E/A ratio 1.27     Mean e' 0.04 m/s    LVOT area 3.1 cm2    LVOT stroke volume 58.38 cm3    AV peak gradient 2 mmHg    E/E' ratio 26.75 m/s    LV Systolic Volume Index 21.0 mL/m2    LV Diastolic Volume Index 40.52 mL/m2    LA Volume Index 51.3 mL/m2    LV Mass Index 75 g/m2    Triscuspid Valve Regurgitation Peak Gradient 11 mmHg    LA Volume Index (Mod) 35.6 mL/m2    BSA 2.15 m2    Right Atrial Pressure (from IVC) 8 mmHg    EF 40 %    TV rest pulmonary artery pressure 19 mmHg    Narrative    · Severe left atrial enlargement.  · The left ventricle is normal in size with mildly decreased systolic   function.  · The estimated ejection fraction is 40%.  · There are segmental left ventricular wall motion abnormalities: apex,   apical septum, mid and apical anterolateral, apical inferior and apical   anterior wall.  · Grade II left ventricular diastolic dysfunction.  · Normal right ventricular size with normal right ventricular systolic   function.  · There is mild aortic valve stenosis.  · Aortic valve area is 1.78 cm2; peak velocity is 0.71 m/s; mean gradient   is 6 mmHg.  · Intermediate central venous pressure (8 mmHg).  · The estimated PA systolic pressure is 19 mmHg.        Assessment and Plan:       NSTEMI (non-ST elevated myocardial infarction)  Patient has elevated troponin which peaked at 7.57 (latest 6.78) and new WMA in LAD and Lcx territory with a drop in EF from 60% to 40%.    Plan:  - Continue ACS protocol  - IC consult    Type 2 myocardial infarction  Troponin elevation secondary to oxygen supply-demand mismatch in the setting of acutely decompensated HF due to missing HD. ECG with NSR, nonspecific ST-T changes. No evidence of ACS.  - Recommend not starting ACS protocol  - Hemodialysis as soon as possible (Nephrology on board)      Atrial fibrillation  History of persistent AF, follows with EP. Given history of CVA and inability to  tolerate AADs, plan for PVI followed by Watchman device placement on 02/07/22.    Plan:  - Recommend stopping apixaban Sunday night prior to PVI  - Off amiodarone  - NPO at midnight        VTE Risk Mitigation (From admission, onward)         Ordered     heparin 25,000 units in dextrose 5% (100 units/ml) IV bolus from bag - ADDITIONAL PRN BOLUS - 60 units/kg (max bolus 4000 units)  As needed (PRN)        Question:  Heparin Infusion Adjustment (DO NOT MODIFY ANSWER)  Answer:  [email or web address]\epic\Images\Pharmacy\HeparinInfusions\heparin LOW INTENSITY nomogram for [facility] [id number]    02/05/22 1918     heparin 25,000 units in dextrose 5% (100 units/ml) IV bolus from bag - ADDITIONAL PRN BOLUS - 30 units/kg (max bolus 4000 units)  As needed (PRN)        Question:  Heparin Infusion Adjustment (DO NOT MODIFY ANSWER)  Answer:  [email or web address]\epic\Images\Pharmacy\HeparinInfusions\heparin LOW INTENSITY nomogram for [facility] [id number]    02/05/22 1918     heparin 25,000 units in dextrose 5% 250 mL (100 units/mL) infusion LOW INTENSITY nomogram - [facility]  Continuous        Question Answer Comment   Heparin Infusion Adjustment (DO NOT MODIFY ANSWER) [email or web address]\epic\Images\Pharmacy\HeparinInfusions\heparin LOW INTENSITY nomogram for [facility] [id number]    Begin at (in units/kg/hr) 12        02/05/22 1918     IP VTE HIGH RISK PATIENT  Once         02/04/22 1508     Place sequential compression device  Until discontinued         02/04/22 1508     Reason for No Pharmacological VTE Prophylaxis  Once        Question:  Reasons:  Answer:  Already adequately anticoagulated on oral Anticoagulants    02/04/22 1508                Marco Antonio Landin MD  Cardiology  [facility] - Telemetry Stepdown ([place]-)

## 2022-02-06 NOTE — NURSING
baseline aptt obtained by lab, heparin drip infusion initiated after verification with charge RN, bolus given as per orders, heparin infusing at 12units/kg/hr @ 8.9ml/hr per nomogram.  Resting well in bed, denies any chest pain. SR on tele monitor. 97% on 2LNC. Continue to monitor.

## 2022-02-06 NOTE — PT/OT/SLP PROGRESS
Physical Therapy      Patient Name:  Kylie King   MRN:  [id number]    Kylie King declined acute physical therapy services. She reported that she is walking to and from the bathroom with rolling walker without physical assistance. She reports that she participates in PT at home and does not want PT in the hospital. Please re-consult PT services if patient has change in status after planned heart cath.     2/6/22

## 2022-02-06 NOTE — ASSESSMENT & PLAN NOTE
Potassium 6.3 on admission in the setting of missed HD session  ECG without hyperkalemic changes. Received IV calcium  Repeat Potassium 5.6. Discussed with nephrology- shifting with albuterol, insulin+glucose and lokelma.   No plans for HD until seen by interventional cardiology for possible LHC

## 2022-02-06 NOTE — ASSESSMENT & PLAN NOTE
On home HD TTh S. Missed HD the day prior to hospital admission.   S/p HD 02/04 (on admit day) with removal of 3L    - Nephrology following. Patient refused HD 02/05. Given ACS, will hold off on HD until seen by interventional cardiology for possible LHC.   - Daily RFP  - Avoid nephrotoxins; renally dose meds  - Renal diet.

## 2022-02-06 NOTE — ASSESSMENT & PLAN NOTE
ECG with non-specific ST-T changes, troponin elevated at 3. BNP elevated in 2000s. Initially thought to be type 2 NSTEMI in the setting of volume overload due to missed HD session.   - Troponin peaked at 7.4, trended down to 6.8  - TTE revealed abnormal wall motion abnormalities in the LAD and Lcx territory with a drop in EF from 60% to 40% concerning for ACS event    Plan   - Initiated on ACS protocol  (loaded with ASA, clopidogrel; heparin gtt, metoprolol, HI statin).   - Pending eval for interventional cardiology for [facility].   - Continue heparin gtt  - Monitor for hemodynamic instability

## 2022-02-06 NOTE — SUBJECTIVE & OBJECTIVE
Interval History: No acute events overnight. Patient reports poor sleep and some mild right flank pain. Comfortable breathing on 2L NS. VSS, afebrile.  Denies chest pain, palpitations, nausea, vomiting, HA, vision changes, diaphoresis.    Review of Systems   Constitutional: Negative for chills, diaphoresis, fever and malaise/fatigue.   HENT: Negative for congestion.    Eyes: Negative for visual disturbance.   Cardiovascular: Negative for chest pain, irregular heartbeat, leg swelling, orthopnea and palpitations.   Respiratory: Negative for cough and shortness of breath.    Gastrointestinal: Positive for abdominal pain (R flank).   Neurological: Negative for dizziness and headaches.   Psychiatric/Behavioral: Negative for altered mental status.     Objective:     Vital Signs (Most Recent):  Temp: 98.2 °F (36.8 °C) (02/06/22 0802)  Pulse: (!) 57 (02/06/22 0807)  Resp: 16 (02/06/22 0807)  BP: (!) 140/65 (02/06/22 0802)  SpO2: (!) 93 % (02/06/22 0807) Vital Signs (24h Range):  Temp:  [97.6 °F (36.4 °C)-98.7 °F (37.1 °C)] 98.2 °F (36.8 °C)  Pulse:  [57-81] 57  Resp:  [16-20] 16  SpO2:  [92 %-98 %] 93 %  BP: (128-151)/(61-80) 140/65     Weight: 102.5 kg (226 lb)  Body mass index is 38.79 kg/m².     SpO2: (!) 93 %  O2 Device (Oxygen Therapy): nasal cannula      Intake/Output Summary (Last 24 hours) at 2/6/2022 1104  Last data filed at 2/6/2022 0716  Gross per 24 hour   Intake 170.22 ml   Output --   Net 170.22 ml       Lines/Drains/Airways     Drain                 Hemodialysis AV Fistula Right forearm -- days         Hemodialysis AV Fistula 05/28/18 1436  1349 days          Airway                 Airway - Non-Surgical 11/22/21 1308 Nasal Cannula 75 days          Peripheral Intravenous Line                 Peripheral IV - Single Lumen 02/04/22 1358 22 G Left Upper Arm 1 day                Physical Exam  Constitutional:       General: She is not in acute distress.     Appearance: She is obese. She is not ill-appearing.    HENT:      Head: Normocephalic and atraumatic.   Eyes:      Extraocular Movements: Extraocular movements intact.      Pupils: Pupils are equal, round, and reactive to light.   Cardiovascular:      Rate and Rhythm: Normal rate and regular rhythm.      Pulses: Normal pulses.      Heart sounds: No murmur heard.  No friction rub. No gallop.       Comments: Right fistula  Pulmonary:      Effort: Pulmonary effort is normal.      Breath sounds: Normal breath sounds.      Comments: NC in place  Abdominal:      General: Bowel sounds are normal. There is no distension.      Palpations: Abdomen is soft.      Tenderness: There is right CVA tenderness.   Musculoskeletal:      Right lower leg: No edema.      Left lower leg: No edema.   Skin:     General: Skin is warm and dry.   Neurological:      General: No focal deficit present.      Mental Status: She is alert and oriented to person, place, and time.   Psychiatric:         Mood and Affect: Mood normal.         Behavior: Behavior normal.         Significant Labs:   CMP   Recent Labs   Lab 02/04/22  1201 02/04/22  1201 02/05/22 0051 02/05/22 0606 02/06/22  0432     --   --  134* 137   K 6.3*   < > 4.4 4.9 5.6*   CL 95  --   --  102 105   CO2 27  --   --  23 20*   *  --   --  134* 113*   BUN 66*  --   --  30* 47*   CREATININE 8.4*  --   --  5.3* 6.7*   CALCIUM 9.3  --   --  9.3 9.4   PROT 7.7  --   --  6.6 7.2   ALBUMIN 3.6  --   --  3.2* 3.5   BILITOT 0.7  --   --  0.6 0.7   ALKPHOS 123  --   --  103 118   AST 38  --   --  24 30   ALT 28  --   --  18 27   ANIONGAP 14  --   --  9 12   ESTGFRAFRICA 5.1*  --   --  9.0* 6.8*   EGFRNONAA 4.5*  --   --  7.8* 5.9*    < > = values in this interval not displayed.   , CBC   Recent Labs   Lab 02/05/22  0606 02/05/22 0606 02/05/22 1959 02/05/22 1959 02/06/22  0432   WBC 5.08  --  5.26  --  4.75  4.75   HGB 9.1*  --  9.1*  --  9.3*  9.3*   HCT 31.0*   < > 29.6*   < > 31.9*  31.9*     --  196  --  185  185    <  > = values in this interval not displayed.   , Troponin   Recent Labs   Lab 02/04/22  1646 02/05/22  0051 02/05/22  0851   TROPONINI 4.869* 7.578* 6.785*    and All pertinent lab results from the last 24 hours have been reviewed.    Significant Imaging: Echocardiogram:   Transthoracic echo (TTE) complete ([facility] Only):   Results for orders placed or performed during the hospital encounter of 02/04/22   Echo   Result Value Ref Range    Ascending aorta 2.66 cm    STJ 2.53 cm    AV mean gradient 6 mmHg    Ao peak colten 0.71 m/s    Ao VTI 32.88 cm    IVRT 74.22 msec    IVS 0.98 0.6 - 1.1 cm    LA size 3.90 cm    Left Atrium Major Axis 6.76 cm    Left Atrium Minor Axis 6.43 cm    LVIDd 5.20 3.5 - 6.0 cm    LVIDs 3.60 2.1 - 4.0 cm    LVOT diameter 1.98 cm    LVOT peak VTI 18.97 cm    Posterior Wall 0.70 0.6 - 1.1 cm    MV Peak A Colten 0.84 m/s    E wave deceleration time 124.98 msec    MV Peak E Colten 1.07 m/s    RA Major Axis 4.83 cm    RA Width 4.22 cm    RVDD 3.94 cm    Sinus 2.92 cm    TAPSE 1.68 cm    TR Max Colten 1.68 m/s    TDI LATERAL 0.04 m/s    TDI SEPTAL 0.04 m/s    LA WIDTH 4.84 cm    MV stenosis pressure 1/2 time 36.24 ms    LV Diastolic Volume 83.47 mL    LV Systolic Volume 43.31 mL    RV S' 11.08 cm/s    LVOT peak colten 0.87 m/s    LA volume (mod) 73.33 cm3    LV LATERAL E/E' RATIO 26.75 m/s    LV SEPTAL E/E' RATIO 26.75 m/s    FS 31 %    LA volume 105.75 cm3    LV mass 154.56 g    Left Ventricle Relative Wall Thickness 0.27 cm    AV valve area 1.78 cm2    AV Velocity Ratio 1.23     AV index (prosthetic) 0.58     MV valve area p 1/2 method 6.07 cm2    E/A ratio 1.27     Mean e' 0.04 m/s    LVOT area 3.1 cm2    LVOT stroke volume 58.38 cm3    AV peak gradient 2 mmHg    E/E' ratio 26.75 m/s    LV Systolic Volume Index 21.0 mL/m2    LV Diastolic Volume Index 40.52 mL/m2    LA Volume Index 51.3 mL/m2    LV Mass Index 75 g/m2    Triscuspid Valve Regurgitation Peak Gradient 11 mmHg    LA Volume Index (Mod) 35.6 mL/m2    BSA  2.15 m2    Right Atrial Pressure (from IVC) 8 mmHg    EF 40 %    TV rest pulmonary artery pressure 19 mmHg    Narrative    · Severe left atrial enlargement.  · The left ventricle is normal in size with mildly decreased systolic   function.  · The estimated ejection fraction is 40%.  · There are segmental left ventricular wall motion abnormalities: apex,   apical septum, mid and apical anterolateral, apical inferior and apical   anterior wall.  · Grade II left ventricular diastolic dysfunction.  · Normal right ventricular size with normal right ventricular systolic   function.  · There is mild aortic valve stenosis.  · Aortic valve area is 1.78 cm2; peak velocity is 0.71 m/s; mean gradient   is 6 mmHg.  · Intermediate central venous pressure (8 mmHg).  · The estimated PA systolic pressure is 19 mmHg.

## 2022-02-06 NOTE — ASSESSMENT & PLAN NOTE
Pt has elevated troponin peaked up to 7.5, has new WMA in LAD and Lcx territory with the drop in the EF   Cont ACS protocol - Aspirin,plavix, lipitor, metoprolol 25 mg bid,lisinopril   Will consult IC on Monday

## 2022-02-06 NOTE — SUBJECTIVE & OBJECTIVE
Interval History:Echo showed WMA in the apical septal and apical anterior,lateraql and inferior walls.     ROS  Objective:     Vital Signs (Most Recent):  Temp: 98.5 °F (36.9 °C) (02/05/22 1933)  Pulse: 81 (02/05/22 1933)  Resp: 16 (02/05/22 1933)  BP: (!) 151/69 (02/05/22 1933)  SpO2: (!) 93 % (02/05/22 1933) Vital Signs (24h Range):  Temp:  [98.1 °F (36.7 °C)-98.7 °F (37.1 °C)] 98.5 °F (36.9 °C)  Pulse:  [69-85] 81  Resp:  [16-20] 16  SpO2:  [92 %-98 %] 93 %  BP: ()/(30-97) 151/69     Weight: 102.5 kg (226 lb)  Body mass index is 38.79 kg/m².     SpO2: (!) 93 %  O2 Device (Oxygen Therapy): nasal cannula      Intake/Output Summary (Last 24 hours) at 2/5/2022 1940  Last data filed at 2/4/2022 2316  Gross per 24 hour   Intake --   Output 3100 ml   Net -3100 ml       Lines/Drains/Airways     Drain                 Hemodialysis AV Fistula Right forearm -- days         Hemodialysis AV Fistula 05/28/18 1436  1349 days          Airway                 Airway - Non-Surgical 11/22/21 1308 Nasal Cannula 75 days          Peripheral Intravenous Line                 Peripheral IV - Single Lumen 02/04/22 1358 22 G Left Upper Arm 1 day         Peripheral IV - Single Lumen 02/04/22 20 G Left Antecubital 1 day                Physical Exam  Constitutional:       General: She is not in acute distress.     Appearance: Normal appearance. She is obese. She is not ill-appearing, toxic-appearing or diaphoretic.   HENT:      Head: Normocephalic and atraumatic.      Nose: Nose normal.      Mouth/Throat:      Mouth: Mucous membranes are moist.   Eyes:      Extraocular Movements: Extraocular movements intact.   Cardiovascular:      Rate and Rhythm: Normal rate and regular rhythm.      Pulses: Normal pulses.      Heart sounds: Normal heart sounds. No murmur heard.       Pulmonary:      Effort: Pulmonary effort is normal. No respiratory distress.      Breath sounds: Normal breath sounds. No wheezing or rales.   Abdominal:      General:  Abdomen is flat. Bowel sounds are normal. There is no distension.      Tenderness: There is no abdominal tenderness.   Musculoskeletal:         General: No swelling or tenderness. Normal range of motion.      Cervical back: Normal range of motion.   Skin:     General: Skin is warm.      Coloration: Skin is not jaundiced or pale.   Neurological:      Mental Status: She is alert and oriented to person, place, and time. Mental status is at baseline.      Comments:    Psychiatric:         Mood and Affect: Mood normal.         Behavior: Behavior normal.         Thought Content: Thought content normal.      Significant Labs: All pertinent lab results from the last 24 hours have been reviewed.    Significant Imaging: EKG: NSR with ST-T wave changes

## 2022-02-06 NOTE — PLAN OF CARE
Problem: Adult Inpatient Plan of Care  Goal: Plan of Care Review  Outcome: Ongoing, Progressing     Problem: Adult Inpatient Plan of Care  Goal: Absence of Hospital-Acquired Illness or Injury  Outcome: Ongoing, Progressing     Problem: Adult Inpatient Plan of Care  Goal: Readiness for Transition of Care  Outcome: Ongoing, Progressing     Pt. AAOx4, VSS, no falls or injuries during shift, safety maintained. Call light in reach, bed locked and in lowest position, side rails up x2. No acute events, walker at bedside, instructed to call for assistance before ambulating, verbalized understanding. Will continue to monitor.

## 2022-02-06 NOTE — ASSESSMENT & PLAN NOTE
Patient has elevated troponin which peaked at 7.57 (latest 6.78) and new WMA in LAD and Lcx territory with a drop in EF from 60% to 40%.    Plan:  - Continue ACS protocol  - IC consult

## 2022-02-06 NOTE — ASSESSMENT & PLAN NOTE
Patient with Long standing persistent (>12 months) atrial fibrillation which is controlled currently with Beta Blocker and Amiodarone. Patient is currently in atrial fibrillation.[id number] Score: 7. Anticoagulation indicated. Anticoagulation done with apixaban.     Plan  - Patient follows with EP- Dr. Kuo and is scheduled for RF PVI with possible VIRAJ closure 02/07/2022. Procedure currently on hold given ACS and anticipation of LHC.  - Continue heparin gtt   - Off amiodarone. Continue carvedilol  - Maintain K>4, Mg>2

## 2022-02-06 NOTE — PROGRESS NOTES
[facility] - Telemetry Stepdown ([place] [address])  Cardiology  Progress Note    Patient Name: Kylie King  MRN: [id number]  Admission Date: 2/4/2022  Hospital Length of Stay: 0 days  Code Status: Full Code   Attending Physician: Ryne Martinez MD   Primary Care Physician: Virginia Foote MD  Expected Discharge Date: 2/8/2022  Principal Problem:Acute on chronic respiratory failure    Subjective:     Hospital Course:   No notes on file    Interval History:Echo showed WMA in the apical septal and apical anterior,lateraql and inferior walls.     ROS  Objective:     Vital Signs (Most Recent):  Temp: 98.5 °F (36.9 °C) (02/05/22 1933)  Pulse: 81 (02/05/22 1933)  Resp: 16 (02/05/22 1933)  BP: (!) 151/69 (02/05/22 1933)  SpO2: (!) 93 % (02/05/22 1933) Vital Signs (24h Range):  Temp:  [98.1 °F (36.7 °C)-98.7 °F (37.1 °C)] 98.5 °F (36.9 °C)  Pulse:  [69-85] 81  Resp:  [16-20] 16  SpO2:  [92 %-98 %] 93 %  BP: ()/(30-97) 151/69     Weight: 102.5 kg (226 lb)  Body mass index is 38.79 kg/m².     SpO2: (!) 93 %  O2 Device (Oxygen Therapy): nasal cannula      Intake/Output Summary (Last 24 hours) at 2/5/2022 1940  Last data filed at 2/4/2022 2316  Gross per 24 hour   Intake --   Output 3100 ml   Net -3100 ml       Lines/Drains/Airways     Drain                 Hemodialysis AV Fistula Right forearm -- days         Hemodialysis AV Fistula 05/28/18 1436  1349 days          Airway                 Airway - Non-Surgical 11/22/21 1308 Nasal Cannula 75 days          Peripheral Intravenous Line                 Peripheral IV - Single Lumen 02/04/22 1358 22 G Left Upper Arm 1 day         Peripheral IV - Single Lumen 02/04/22 20 G Left Antecubital 1 day                Physical Exam  Constitutional:       General: She is not in acute distress.     Appearance: Normal appearance. She is obese. She is not ill-appearing, toxic-appearing or diaphoretic.   HENT:      Head: Normocephalic and atraumatic.      Nose: Nose normal.       Mouth/Throat:      Mouth: Mucous membranes are moist.   Eyes:      Extraocular Movements: Extraocular movements intact.   Cardiovascular:      Rate and Rhythm: Normal rate and regular rhythm.      Pulses: Normal pulses.      Heart sounds: Normal heart sounds. No murmur heard.       Pulmonary:      Effort: Pulmonary effort is normal. No respiratory distress.      Breath sounds: Normal breath sounds. No wheezing or rales.   Abdominal:      General: Abdomen is flat. Bowel sounds are normal. There is no distension.      Tenderness: There is no abdominal tenderness.   Musculoskeletal:         General: No swelling or tenderness. Normal range of motion.      Cervical back: Normal range of motion.   Skin:     General: Skin is warm.      Coloration: Skin is not jaundiced or pale.   Neurological:      Mental Status: She is alert and oriented to person, place, and time. Mental status is at baseline.      Comments:    Psychiatric:         Mood and Affect: Mood normal.         Behavior: Behavior normal.         Thought Content: Thought content normal.      Significant Labs: All pertinent lab results from the last 24 hours have been reviewed.    Significant Imaging: EKG: NSR with ST-T wave changes     Assessment and Plan:       NSTEMI (non-ST elevated myocardial infarction)  Pt has elevated troponin peaked up to 7.5, has new WMA in LAD and Lcx territory with the drop in the EF   Cont ACS protocol - Aspirin,plavix, lipitor, metoprolol 25 mg bid,lisinopril   Will consult IC on Monday            Atrial fibrillation  History of persistent AF, follows with EP . Given history of CVA and inability to tolerate AADs, plan for PVI followed by [facility] device placement on 02/07/22.   - Recommend stopping apixaban Sunday night prior to PVI  - Off amiodarone         VTE Risk Mitigation (From admission, onward)         Ordered     heparin 25,000 units in dextrose 5% (100 units/ml) IV bolus from bag - ADDITIONAL PRN BOLUS - 60 units/kg (max  bolus 4000 units)  As needed (PRN)        Question:  Heparin Infusion Adjustment (DO NOT MODIFY ANSWER)  Answer:  [email or web address]\epic\Images\Pharmacy\HeparinInfusions\heparin LOW INTENSITY nomogram for [facility] [id number]    02/05/22 1918     heparin 25,000 units in dextrose 5% (100 units/ml) IV bolus from bag - ADDITIONAL PRN BOLUS - 30 units/kg (max bolus 4000 units)  As needed (PRN)        Question:  Heparin Infusion Adjustment (DO NOT MODIFY ANSWER)  Answer:  [email or web address]\epic\Images\Pharmacy\HeparinInfusions\heparin LOW INTENSITY nomogram for [facility] [id number].pdf    02/05/22 1918     heparin 25,000 units in dextrose 5% (100 units/ml) IV bolus from bag INITIAL BOLUS (max bolus 4000 units)  Once        Question:  Heparin Infusion Adjustment (DO NOT MODIFY ANSWER)  Answer:  [email or web address]\epic\Images\Pharmacy\HeparinInfusions\heparin LOW INTENSITY nomogram for [facility] [id number].pdf    02/05/22 1918     heparin 25,000 units in dextrose 5% 250 mL (100 units/mL) infusion LOW INTENSITY nomogram - [facility]  Continuous        Question Answer Comment   Heparin Infusion Adjustment (DO NOT MODIFY ANSWER) \\[email or web address]\epic\Images\Pharmacy\HeparinInfusions\heparin LOW INTENSITY nomogram for [facility] [id number].pdf    Begin at (in units/kg/hr) 12        02/05/22 1918     IP VTE HIGH RISK PATIENT  Once         02/04/22 1508     Place sequential compression device  Until discontinued         02/04/22 1508     Reason for No Pharmacological VTE Prophylaxis  Once        Question:  Reasons:  Answer:  Already adequately anticoagulated on oral Anticoagulants    02/04/22 1508              Discussed with the staff     Kvng Howe MD  Cardiology  [facility] - Tele[facility] Step[facility] ([place]-)

## 2022-02-06 NOTE — ASSESSMENT & PLAN NOTE
On home amlodipine 10mg, coreg 12.5mg BID, hydralazine 50mg TID    - Initiated metoprolol 25mg tartrate BID and losartan 25mg daily. Titrate GDMT as tolerated  - HD as scheduled by nephrology

## 2022-02-06 NOTE — CARE UPDATE
Troponin this AM peaked at 7.4, trended down to 6.78. Repeat ECG without evidence of ischemia. Patient denies chest pain or dyspnea but does have right sided flank pain. TTE revealed segmental left ventricular wall motion abnormalities: apex, apical septum, mid and apical anterolateral, apical inferior and apical anterior wall. All of which appear new in comparison to TTE from 3 months ago    Plan  ·  Initiated ACS- she was loaded with ASA yesterday in the ED, so will continue 81mg. Finish clopidogrel load today then 75mg starting tomorrow. Continue high intensity statin. Initiated heparin gtt and d/c eliquis.   · Cardiology re-consulted to evaluate for C candidacy and for further recs.      Derrell Watson MD  Internal Medicine PGY3

## 2022-02-06 NOTE — SUBJECTIVE & OBJECTIVE
Interval History: NAEON. Patient denies chest pain or dyspnea. No bleeding episodes. K 5.6 this AM.     Review of Systems   Constitutional: Negative for activity change, chills, fever and unexpected weight change.   HENT: Negative for congestion.    Eyes: Negative for visual disturbance.   Respiratory: Negative for cough, shortness of breath and wheezing.    Cardiovascular: Negative for chest pain.   Gastrointestinal: Negative for abdominal distention, abdominal pain, constipation, diarrhea, nausea and vomiting.   Genitourinary: Positive for flank pain.   Skin: Negative for color change and pallor.   Neurological: Negative for light-headedness.   Psychiatric/Behavioral: Negative for agitation, behavioral problems and confusion.     Objective:     Vital Signs (Most Recent):  Temp: 98.1 °F (36.7 °C) (02/06/22 1202)  Pulse: 70 (02/06/22 1202)  Resp: 19 (02/06/22 1202)  BP: (!) 163/80 (02/06/22 1202)  SpO2: 100 % (02/06/22 1202) Vital Signs (24h Range):  Temp:  [97.6 °F (36.4 °C)-98.7 °F (37.1 °C)] 98.1 °F (36.7 °C)  Pulse:  [57-81] 70  Resp:  [16-20] 19  SpO2:  [92 %-100 %] 100 %  BP: (128-163)/(61-80) 163/80     Weight: 102.5 kg (226 lb)  Body mass index is 38.79 kg/m².    Intake/Output Summary (Last 24 hours) at 2/6/2022 1234  Last data filed at 2/6/2022 0716  Gross per 24 hour   Intake 170.22 ml   Output --   Net 170.22 ml      Physical Exam  Vitals and nursing note reviewed.   Constitutional:       General: She is not in acute distress.     Appearance: Normal appearance. She is obese. She is not ill-appearing, toxic-appearing or diaphoretic.   HENT:      Head: Normocephalic and atraumatic.      Nose: Nose normal.      Mouth/Throat:      Mouth: Mucous membranes are moist.   Eyes:      Extraocular Movements: Extraocular movements intact.   Cardiovascular:      Rate and Rhythm: Normal rate and regular rhythm.      Pulses: Normal pulses.      Heart sounds: Normal heart sounds. No murmur heard.      Pulmonary:       Effort: Pulmonary effort is normal. No respiratory distress.      Breath sounds: Normal breath sounds. No wheezing or rales.   Abdominal:      General: Abdomen is flat. Bowel sounds are normal. There is no distension.      Tenderness: There is no abdominal tenderness.   Musculoskeletal:         General: No swelling or tenderness. Normal range of motion.      Cervical back: Normal range of motion.   Skin:     General: Skin is warm.      Coloration: Skin is not jaundiced or pale.   Neurological:      Mental Status: She is alert and oriented to person, place, and time. Mental status is at baseline.   Psychiatric:         Mood and Affect: Mood normal.         Behavior: Behavior normal.         Thought Content: Thought content normal.         Significant Labs:   All pertinent labs within the past 24 hours have been reviewed.  BMP:   Recent Labs   Lab 02/06/22 0432   *      K 5.6*      CO2 20*   BUN 47*   CREATININE 6.7*   CALCIUM 9.4   MG 2.3     CBC:   Recent Labs   Lab 02/05/22 0606 02/05/22 1959 02/06/22 0432   WBC 5.08 5.26 4.75  4.75   HGB 9.1* 9.1* 9.3*  9.3*   HCT 31.0* 29.6* 31.9*  31.9*    196 185  185     CMP:   Recent Labs   Lab 02/05/22 0051 02/05/22 0606 02/06/22 0432   NA  --  134* 137   K 4.4 4.9 5.6*   CL  --  102 105   CO2  --  23 20*   GLU  --  134* 113*   BUN  --  30* 47*   CREATININE  --  5.3* 6.7*   CALCIUM  --  9.3 9.4   PROT  --  6.6 7.2   ALBUMIN  --  3.2* 3.5   BILITOT  --  0.6 0.7   ALKPHOS  --  103 118   AST  --  24 30   ALT  --  18 27   ANIONGAP  --  9 12   EGFRNONAA  --  7.8* 5.9*     Coagulation:   Recent Labs   Lab 02/05/22 1959 02/05/22 1959 02/06/22 0432   INR 1.1  --   --    APTT 29.4   < > 27.8    < > = values in this interval not displayed.     Troponin:   Recent Labs   Lab 02/04/22  1646 02/05/22 0051 02/05/22  0851   TROPONINI 4.869* 7.578* 6.785*       Significant Imaging: I have reviewed all pertinent imaging results/findings within the past  24 hours.

## 2022-02-06 NOTE — PROGRESS NOTES
[facility] - Telemetry Stepdown ([place] [address])  [facility] Medicine  Progress Note    Patient Name: Kylie King  MRN: [id number]  Patient Class: OP- Observation   Admission Date: 2/4/2022  Length of Stay: 0 days  Attending Physician: Ryne Martinez MD  Primary Care Provider: Virginia Foote MD        Subjective:     Principal Problem:Acute on chronic respiratory failure        HPI:  Kylie King is a 66 year old female with chronic respiratory failure (on home 2L of O2), COPD, ESRD on HD TTS w/ anemia of chronic disease, Atrial Fib (failed DCCV in Nov 2021); on eliquis and amiodarone, CVA, PVD (s/p left foot amputation on Plavix), HTN, T2DM, HLD, who presented to the emergency department for dyspnea. Patient reports she has been out of her oxygen for the past few days. She reports dyspnea started a few days ago but worsened overnight. She last had HD on Tuesday and missed her Thursday session due to feeling unwell. She denies chest pain, LE edema. She is anuric. She reports she is scheduled to undergo an RFA for her atrial fibrillation on Monday morning and was told to hold her amiodarone, although she isn't clear as to whether she has been taking the medication. She also reports taking eliquis twice daily. Of note, the patient has had multiple hospital visits (4 in the past six months). She has a history of falls for which eliquis was held, however she recently was seen by EP- Dr. Kuo who recommended she continue her eliquis  but hold it the night prior to RF-PVI and possible VIRAJ closure device.    Upon arrival to the ED, she was hemodynamically stable, placed on 2L of O2. Labs revealed K of 6.3, BNP 2004, Troponin 3.14. CXR revealed pulmonary edema and possible left sided opacification. ECG revealed non specific ST-T wave changes. Cardiology was consulted. Patient was shifted and received calcium for cardioprotection. Patient was admitted into Hospital medicine for further management.         Overview/Hospital Course:  Patient was admitted for hypervolemia in the setting of missed dialysis session and hyperkalemia of 6.3. Cardiology was consulted for elevated troponin (up to 3) on admit day and deemed this secondary to NSTEMI in the setting of missed HD. ECG revealed non specific ST-T wave changes. She susbequently underwent HD with removal of 3.1L. Troponin levels continued to uptrend, peaked at 7.4 and trended down to 6.78. Cardiology was re-consulted and felt it wasn't due to acute coronary syndrome at that time. Repeat ECG didn't reveal ischemic changes. TTE revealed abnormal wall motion abnormalities in the LAD and Lcx territory with a drop in EF from 60% to 40%. Initiated on ACS protocol, pending eval for interventional cardiology for LHC.       Interval History: NAEON. Patient denies chest pain or dyspnea. No bleeding episodes. K 5.6 this AM.     Review of Systems   Constitutional: Negative for activity change, chills, fever and unexpected weight change.   HENT: Negative for congestion.    Eyes: Negative for visual disturbance.   Respiratory: Negative for cough, shortness of breath and wheezing.    Cardiovascular: Negative for chest pain.   Gastrointestinal: Negative for abdominal distention, abdominal pain, constipation, diarrhea, nausea and vomiting.   Genitourinary: Positive for flank pain.   Skin: Negative for color change and pallor.   Neurological: Negative for light-headedness.   Psychiatric/Behavioral: Negative for agitation, behavioral problems and confusion.     Objective:     Vital Signs (Most Recent):  Temp: 98.1 °F (36.7 °C) (02/06/22 1202)  Pulse: 70 (02/06/22 1202)  Resp: 19 (02/06/22 1202)  BP: (!) 163/80 (02/06/22 1202)  SpO2: 100 % (02/06/22 1202) Vital Signs (24h Range):  Temp:  [97.6 °F (36.4 °C)-98.7 °F (37.1 °C)] 98.1 °F (36.7 °C)  Pulse:  [57-81] 70  Resp:  [16-20] 19  SpO2:  [92 %-100 %] 100 %  BP: (128-163)/(61-80) 163/80     Weight: 102.5 kg (226 lb)  Body mass  index is 38.79 kg/m².    Intake/Output Summary (Last 24 hours) at 2/6/2022 1234  Last data filed at 2/6/2022 0716  Gross per 24 hour   Intake 170.22 ml   Output --   Net 170.22 ml      Physical Exam  Vitals and nursing note reviewed.   Constitutional:       General: She is not in acute distress.     Appearance: Normal appearance. She is obese. She is not ill-appearing, toxic-appearing or diaphoretic.   HENT:      Head: Normocephalic and atraumatic.      Nose: Nose normal.      Mouth/Throat:      Mouth: Mucous membranes are moist.   Eyes:      Extraocular Movements: Extraocular movements intact.   Cardiovascular:      Rate and Rhythm: Normal rate and regular rhythm.      Pulses: Normal pulses.      Heart sounds: Normal heart sounds. No murmur heard.      Pulmonary:      Effort: Pulmonary effort is normal. No respiratory distress.      Breath sounds: Normal breath sounds. No wheezing or rales.   Abdominal:      General: Abdomen is flat. Bowel sounds are normal. There is no distension.      Tenderness: There is no abdominal tenderness.   Musculoskeletal:         General: No swelling or tenderness. Normal range of motion.      Cervical back: Normal range of motion.   Skin:     General: Skin is warm.      Coloration: Skin is not jaundiced or pale.   Neurological:      Mental Status: She is alert and oriented to person, place, and time. Mental status is at baseline.   Psychiatric:         Mood and Affect: Mood normal.         Behavior: Behavior normal.         Thought Content: Thought content normal.         Significant Labs:   All pertinent labs within the past 24 hours have been reviewed.  BMP:   Recent Labs   Lab 02/06/22  0432   *      K 5.6*      CO2 20*   BUN 47*   CREATININE 6.7*   CALCIUM 9.4   MG 2.3     CBC:   Recent Labs   Lab 02/05/22  0606 02/05/22 1959 02/06/22  0432   WBC 5.08 5.26 4.75  4.75   HGB 9.1* 9.1* 9.3*  9.3*   HCT 31.0* 29.6* 31.9*  31.9*    196 185  185     CMP:    Recent Labs   Lab 02/05/22  0051 02/05/22  0606 02/06/22  0432   NA  --  134* 137   K 4.4 4.9 5.6*   CL  --  102 105   CO2  --  23 20*   GLU  --  134* 113*   BUN  --  30* 47*   CREATININE  --  5.3* 6.7*   CALCIUM  --  9.3 9.4   PROT  --  6.6 7.2   ALBUMIN  --  3.2* 3.5   BILITOT  --  0.6 0.7   ALKPHOS  --  103 118   AST  --  24 30   ALT  --  18 27   ANIONGAP  --  9 12   EGFRNONAA  --  7.8* 5.9*     Coagulation:   Recent Labs   Lab 02/05/22 1959 02/05/22 1959 02/06/22 0432   INR 1.1  --   --    APTT 29.4   < > 27.8    < > = values in this interval not displayed.     Troponin:   Recent Labs   Lab 02/04/22  1646 02/05/22 0051 02/05/22  0851   TROPONINI 4.869* 7.578* 6.785*       Significant Imaging: I have reviewed all pertinent imaging results/findings within the past 24 hours.      Assessment/Plan:      * Acute on chronic respiratory failure  In the setting of hypervolemia due to missed HD session vs decompensated HF.   On home 2L of O2.    - HD for volume removal   - Wean O2 to maintain sats 88-92% given hx of COPD      NSTEMI (non-ST elevated myocardial infarction)  ECG with non-specific ST-T changes, troponin elevated at 3. BNP elevated in 2000s. Initially thought to be type 2 NSTEMI in the setting of volume overload due to missed HD session.   - Troponin peaked at 7.4, trended down to 6.8  - TTE revealed abnormal wall motion abnormalities in the LAD and Lcx territory with a drop in EF from 60% to 40% concerning for ACS event    Plan   - Initiated on ACS protocol  (loaded with ASA, clopidogrel; heparin gtt, metoprolol, HI statin).   - Pending eval for interventional cardiology for [facility].   - Continue heparin gtt  - Monitor for hemodynamic instability        Atrial fibrillation  Patient with Long standing persistent (>12 months) atrial fibrillation which is controlled currently with Beta Blocker and Amiodarone. Patient is currently in atrial fibrillation.[id number] Score: 7. Anticoagulation indicated.  Anticoagulation done with apixaban.     Plan  - Patient follows with EP- Dr. Kuo and is scheduled for RF PVI with possible VIRAJ closure 02/07/2022. Procedure currently on hold given ACS and anticipation of LHC.  - Continue heparin gtt   - Off amiodarone. Continue carvedilol  - Maintain K>4, Mg>2      Anemia in ESRD (end-stage renal disease)  Baseline Hgb 9-10. Secondary to anemia of chronic disease.  Hgb 8.4 on admission    - Will discuss EPO administration with nephrology as Hgb <10  - Iron studies  - Daily CBC    Hyperkalemia  Potassium 6.3 on admission in the setting of missed HD session  ECG without hyperkalemic changes. Received IV calcium  Repeat Potassium 5.6. Discussed with nephrology- shifting with albuterol, insulin+glucose and lokelma.   No plans for HD until seen by interventional cardiology for possible LHC    Mild major depressive disorder  Continue home citalopram    End-stage renal disease on hemodialysis  On home HD TTh S. Missed HD the day prior to hospital admission.   S/p HD 02/04 (on admit day) with removal of 3L    - Nephrology following. Patient refused HD 02/05. Given ACS, will hold off on HD until seen by interventional cardiology for possible LHC.   - Daily RFP  - Avoid nephrotoxins; renally dose meds  - Renal diet.      Acute on chronic diastolic heart failure  BNP elevated at 2000s. CXR with pulmonary edema.   Tolerated HD on admit day with removal of 3L.  Likely now with ischemic cardiomyopathy    TTE 02/05/2022  · Severe left atrial enlargement.  · The left ventricle is normal in size with mildly decreased systolic function.  · The estimated ejection fraction is 40%.  · There are segmental left ventricular wall motion abnormalities: apex, apical septum, mid and apical anterolateral, apical inferior and apical anterior wall.  · Grade II left ventricular diastolic dysfunction.  · Normal right ventricular size with normal right ventricular systolic function.  · There is mild aortic  valve stenosis.  · Aortic valve area is 1.78 cm2; peak velocity is 0.71 m/s; mean gradient is 6 mmHg.  · Intermediate central venous pressure (8 mmHg).  · The estimated PA systolic pressure is 19 mmHg.    Plan   - Fluid removal with HD  - Low salt diet   - Fluid restriction of 1500ml daily  - Continue GDMT and titrate as tolerated.       Type II diabetes mellitus  Controlled A1c 5.9.     - POCT glucose ACHS  - Maintain -180      Hyperlipidemia  Continue home statin      Hypertension  On home amlodipine 10mg, coreg 12.5mg BID, hydralazine 50mg TID    - Initiated metoprolol 25mg tartrate BID and losartan 25mg daily. Titrate GDMT as tolerated  - HD as scheduled by nephrology        VTE Risk Mitigation (From admission, onward)         Ordered     heparin 25,000 units in dextrose 5% (100 units/ml) IV bolus from bag - ADDITIONAL PRN BOLUS - 60 units/kg (max bolus 4000 units)  As needed (PRN)        Question:  Heparin Infusion Adjustment (DO NOT MODIFY ANSWER)  Answer:  \\[email or web address]\epic\Images\Pharmacy\HeparinInfusions\heparin LOW INTENSITY nomogram for [facility] [id number]    02/05/22 1918     heparin 25,000 units in dextrose 5% (100 units/ml) IV bolus from bag - ADDITIONAL PRN BOLUS - 30 units/kg (max bolus 4000 units)  As needed (PRN)        Question:  Heparin Infusion Adjustment (DO NOT MODIFY ANSWER)  Answer:  \\[email or web address]\epic\Images\Pharmacy\HeparinInfusions\heparin LOW INTENSITY nomogram for [facility] [id number]    02/05/22 1918     heparin 25,000 units in dextrose 5% 250 mL (100 units/mL) infusion LOW INTENSITY nomogram - [facility]  Continuous        Question Answer Comment   Heparin Infusion Adjustment (DO NOT MODIFY ANSWER) \\[email or web address]\epic\Images\Pharmacy\HeparinInfusions\heparin LOW INTENSITY nomogram for [facility] [id number]    Begin at (in units/kg/hr) 12        02/05/22 1918     IP VTE HIGH RISK PATIENT  Once         02/04/22 1508     Place sequential compression device  Until discontinued         02/04/22 1508      Reason for No Pharmacological VTE Prophylaxis  Once        Question:  Reasons:  Answer:  Already adequately anticoagulated on oral Anticoagulants    02/04/22 1508                Discharge Planning   UMAIR: 2/8/2022     Code Status: Full Code   Is the patient medically ready for discharge?: No    Reason for patient still in hospital (select all that apply): Patient trending condition             Level of service 3        Derrell Watson MD  Department of Hospital Medicine   [facility] - Telemetry Stepdown ([place]-)

## 2022-02-06 NOTE — ASSESSMENT & PLAN NOTE
History of persistent AF, follows with EP. Given history of CVA and inability to tolerate AADs, plan for PVI followed by Watchman device placement on 02/07/22.    Plan:  - Recommend stopping apixaban Sunday night prior to PVI  - Off amiodarone  - NPO at midnight

## 2022-02-06 NOTE — PLAN OF CARE
APTT 27.8, per nomogram 4000 units bolus given and dose increased by 3units/kg/hr.  Heparin currently infusing at 15units/kg/hr with rate of 11.1ml/hr.  next APTT to be drawn in 6hrs.    Problem: Dysrhythmia (Acute Coronary Syndrome)  Goal: Normalized Cardiac Rhythm  Outcome: Ongoing, Progressing     Problem: Cardiac-Related Pain (Acute Coronary Syndrome)  Goal: Absence of Cardiac-Related Pain  Outcome: Ongoing, Progressing     Problem: Hemodynamic Instability (Acute Coronary Syndrome)  Goal: Effective Cardiac Pump Function  Outcome: Ongoing, Progressing     Problem: Tissue Perfusion (Acute Coronary Syndrome)  Goal: Adequate Tissue Perfusion  Outcome: Ongoing, Progressing     Problem: Gas Exchange Impaired  Goal: Optimal Gas Exchange  Outcome: Ongoing, Progressing

## 2022-02-06 NOTE — ASSESSMENT & PLAN NOTE
BNP elevated at 2000s. CXR with pulmonary edema.   Tolerated HD on admit day with removal of 3L.  Likely now with ischemic cardiomyopathy    TTE 02/05/2022  · Severe left atrial enlargement.  · The left ventricle is normal in size with mildly decreased systolic function.  · The estimated ejection fraction is 40%.  · There are segmental left ventricular wall motion abnormalities: apex, apical septum, mid and apical anterolateral, apical inferior and apical anterior wall.  · Grade II left ventricular diastolic dysfunction.  · Normal right ventricular size with normal right ventricular systolic function.  · There is mild aortic valve stenosis.  · Aortic valve area is 1.78 cm2; peak velocity is 0.71 m/s; mean gradient is 6 mmHg.  · Intermediate central venous pressure (8 mmHg).  · The estimated PA systolic pressure is 19 mmHg.    Plan   - Fluid removal with HD  - Low salt diet   - Fluid restriction of 1500ml daily  - Continue GDMT and titrate as tolerated.

## 2022-02-07 NOTE — NURSING
Dr. Shah notified that pt has been refusing her Lokema with K elevated. Pt counseled on it's importance but pt continues to refuse medication. Will continue to encourage compliance with medication to pt.

## 2022-02-07 NOTE — ASSESSMENT & PLAN NOTE
Patient has elevated troponin which peaked at 7.57 (latest 6.78) and new WMA in LAD and Lcx territory with a drop in EF from 60% to 40%.    Plan:  - Continue ACS protocol  - Cath today

## 2022-02-07 NOTE — ASSESSMENT & PLAN NOTE
History of persistent AF, follows with EP. Given history of CVA and inability to tolerate AADs, plan for PVI followed by Watchman device placement.    Plan:  - No PVI pending Cath. If no obstructive disease, NOAH/CV and wait for EF recovery before PVI  - Off amiodarone

## 2022-02-07 NOTE — PLAN OF CARE
Pt. AAOx3 in bed, no complaints or distress noted. Continuous cardiac monitoring and pulse oximetry in place. 2L NC in place. 21units/kg infusion in progress to left upper 22G. Walker near bedside. Call bell and belongings within reach. Pt. Aware to call for assistance upon ambulation. Bed in lowest position.

## 2022-02-07 NOTE — PROGRESS NOTES
Progress Note  Hospital Medicine    Primary Team: [facility] Medicine Team V  Admit Date: 2/4/2022   Length of Stay:  LOS: 0 days   SUBJECTIVE:   Reason for Admission:  Acute on chronic respiratory failure    HPI:  Kylie King is a 66 year old female with chronic respiratory failure (on home 2L of O2), COPD, ESRD on HD TTS w/ anemia of chronic disease, Atrial Fib (failed DCCV in Nov 2021); on eliquis and amiodarone, CVA, PVD (s/p left foot amputation on Plavix), HTN, T2DM, HLD, who presented to the emergency department for dyspnea. Patient reports she has been out of her oxygen for the past few days. She reports dyspnea started a few days ago but worsened overnight. She last had HD on Tuesday and missed her Thursday session due to feeling unwell. She denies chest pain, LE edema. She is anuric. She reports she is scheduled to undergo an RFA for her atrial fibrillation on Monday morning and was told to hold her amiodarone, although she isn't clear as to whether she has been taking the medication. She also reports taking eliquis twice daily. Of note, the patient has had multiple hospital visits (4 in the past six months). She has a history of falls for which eliquis was held, however she recently was seen by EP- Dr. Kuo who recommended she continue her eliquis  but hold it the night prior to RF-PVI and possible VIRAJ closure device.     Upon arrival to the ED, she was hemodynamically stable, placed on 2L of O2. Labs revealed K of 6.3, BNP 2004, Troponin 3.14. CXR revealed pulmonary edema and possible left sided opacification. ECG revealed non specific ST-T wave changes. Cardiology was consulted. Patient was shifted and received calcium for cardioprotection. Patient was admitted into Hospital medicine for further management.          Overview/Hospital Course:  Patient was admitted for hypervolemia in the setting of missed dialysis session and hyperkalemia of 6.3. Cardiology was consulted for elevated troponin  (up to 3) on admit day and deemed this secondary to NSTEMI in the setting of missed HD. ECG revealed non specific ST-T wave changes. She susbequently underwent HD with removal of 3.1L. Troponin levels continued to uptrend, peaked at 7.4 and trended down to 6.78. Cardiology was re-consulted and felt it wasn't due to acute coronary syndrome at that time. Repeat ECG didn't reveal ischemic changes. TTE revealed abnormal wall motion abnormalities in the LAD and Lcx territory with a drop in EF from 60% to 40%. Initiated on ACS protocol, pending eval for interventional cardiology for [facility].         Interval History: NAEON. Patient seen in HD after C this morning.  She denies chest pain since admission, no SOB.    Review of Systems:  Constitutional: no fever or chills  Respiratory: no cough or shortness of breath  Cardiovascular: no chest pain or palpitations  Gastrointestinal: no nausea or vomiting, no abdominal pain or change in bowel habits  Musculoskeletal: no arthralgias or myalgias     OBJECTIVE:     Temp:  [96.4 °F (35.8 °C)-98.5 °F (36.9 °C)]   Pulse:  [55-97]   Resp:  [16-24]   BP: ()/(44-86)   SpO2:  [91 %-100 %]  Body mass index is 38.79 kg/m².  Intake/Outake:  This Shift:  No intake/output data recorded.    Net I/O past 24h:   No intake or output data in the 24 hours ending 02/07/22 2303          Physical Exam:  Gen- well-developed, well-nourished, NAD  CVS- S1 and S2 present, RRR, no murmurs  Resp- CTA b/l, no work of breathing  Abd- BS+, soft, NT, ND  Ext- no clubbing, cyanosis.  Left forefoot amputated.    Laboratory:  CBC/Anemia Labs: Coags:    Recent Labs   Lab 02/05/22 1959 02/06/22  0432 02/07/22  0523   WBC 5.26 4.75  4.75 5.88   HGB 9.1* 9.3*  9.3* 9.4*   HCT 29.6* 31.9*  31.9* 31.8*    185  185 213   MCV 92 96  96 93   RDW 19.2* 19.3*  19.3* 18.9*    Recent Labs   Lab 02/02/22  1100 02/02/22  1100 02/05/22  1959 02/06/22  0432 02/06/22  2325 02/07/22  0522 02/07/22  1437   INR 1.1   --  1.1  --   --   --   --    APTT 26.2   < > 29.4   < > 22.3 32.2* 28.2    < > = values in this interval not displayed.        Chemistries:   Recent Labs   Lab 02/05/22 0606 02/05/22 0606 02/06/22 0432 02/06/22 0432 02/06/22  1157 02/06/22  1157 02/06/22 2110 02/06/22 2110 02/06/22  2325 02/07/22  0523 02/07/22  1437   *   < > 137   < > 137  --  134*  --   --  133*  --    K 4.9   < > 5.6*   < > 6.0*   < > 5.4*   < > 5.4* 5.4*  5.4* 5.7*      < > 105   < > 104  --  102  --   --  100  --    CO2 23   < > 20*   < > 19*  --  17*  --   --  18*  --    BUN 30*   < > 47*   < > 52*  --  60*  --   --  60*  --    CREATININE 5.3*   < > 6.7*   < > 7.2*  --  7.7*  --   --  8.0*  --    CALCIUM 9.3   < > 9.4   < > 9.2  --  9.3  --   --  8.9  --    PROT 6.6  --  7.2  --   --   --   --   --   --  7.1  --    BILITOT 0.6  --  0.7  --   --   --   --   --   --  0.8  --    ALKPHOS 103  --  118  --   --   --   --   --   --  113  --    ALT 18  --  27  --   --   --   --   --   --  32  --    AST 24  --  30  --   --   --   --   --   --  30  --    MG 2.1  --  2.3  --   --   --   --   --   --  2.1  --    PHOS 5.6*  --  6.7*  --   --   --   --   --   --  7.6*  --     < > = values in this interval not displayed.        Medications:  Scheduled Meds:   sodium chloride 0.9%   Intravenous Once    sodium chloride 0.9%   Intravenous Once    sodium chloride 0.9%   Intravenous Once    aspirin  81 mg Oral Daily    atorvastatin  40 mg Oral Daily    citalopram  20 mg Oral Nightly    clopidogreL  75 mg Oral Daily    metoprolol tartrate  25 mg Oral BID    sevelamer carbonate  1,600 mg Oral TID WM    sodium zirconium cyclosilicate  10 g Oral TID                             Continuous Infusions:   heparin (porcine) in D5W Stopped (02/07/22 0855)     PRN Meds:.acetaminophen, dextrose 10%, dextrose 10%, glucagon (human recombinant), glucose, glucose, heparin (porcine), heparin (PORCINE), heparin (PORCINE), insulin aspart U-100,  naloxone, ondansetron, sodium chloride 0.9%, sodium chloride 0.9%     ASSESSMENT/PLAN:     Active Hospital Problems    Diagnosis  POA    *Acute on chronic respiratory failure [J96.20]  Yes    NSTEMI (non-ST elevated myocardial infarction) [I21.4]  Unknown    Atrial fibrillation [I48.91]  Yes    Anemia in ESRD (end-stage renal disease) [N18.6, D63.1]  Yes    Hyperkalemia [E87.5]  Yes    Mild major depressive disorder [F32.9]  Yes    End-stage renal disease on hemodialysis [N18.6, Z99.2]  Not Applicable     Chronic    Acute on chronic diastolic heart failure [I50.33]  Yes    Type II diabetes mellitus [E11.9]  Yes     Chronic    Hypertension [I10]  Yes    Hyperlipidemia [E78.5]  Yes      Resolved Hospital Problems   No resolved problems to display.     Acute on chronic respiratory failure  In the setting of hypervolemia due to missed HD session vs decompensated HF.   On home 2L of O2.     - HD for volume removal   - Wean O2 to maintain sats 88-92% given hx of COPD  - She reports she is out of oxygen at home; will do 6MWT before discharge        NSTEMI (non-ST elevated myocardial infarction)  ECG with non-specific ST-T changes, troponin elevated at 3. BNP elevated in 2000s. Initially thought to be type 2 NSTEMI in the setting of volume overload due to missed HD session.   - Troponin peaked at 7.4, trended down to 6.8  - TTE revealed abnormal wall motion abnormalities in the LAD and Lcx territory with a drop in EF from 60% to 40% concerning for ACS event  - LHC 2/7 showed left circumflex ostial 70% lesion; plan repeat LHC and possible intervention 2/11  - Recommend to continue ACS protocol including Heparin gtt until intervention  - DAPT, Metoprolol, statin        Atrial fibrillation  Patient with Long standing persistent (>12 months) atrial fibrillation which is controlled currently with Beta Blocker and Amiodarone. Patient is currently in atrial fibrillation.[id number] Score: 7. Anticoagulation indicated, on  Eliquis at home     Plan  - Patient follows with EP- Dr. Kuo has rescheduled RF PVI with possible VIRAJ closure.  Currently in NSR, but recommends NOAH/CV and await recovery of EF prior to pursuing PVI   - Continue heparin gtt   - Off amiodarone. Continue Metoprolol  - Maintain K>4, Mg>2        Anemia in ESRD (end-stage renal disease)  Baseline Hgb 9-10. Secondary to anemia of chronic disease.  Hgb 8.4 on admission     - Will discuss EPO administration with nephrology as Hgb <10  - Iron studies  - Daily CBC     Hyperkalemia  Potassium 6.3 on admission in the setting of missed HD session  ECG without hyperkalemic changes. Received IV calcium  Today K 5.7 but drawn prior to HD; will encourage Lokelma use and trend     Mild major depressive disorder  Continue home citalopram     End-stage renal disease on hemodialysis  On home HD TTh S. Missed HD the day prior to hospital admission.   S/p HD 02/04 (on admit day) with removal of 3L     - Nephrology following. Patient refused HD 02/05, tolerated 3h 2/7  - Daily RFP  - Avoid nephrotoxins; renally dose meds  - Renal diet.        Acute on chronic diastolic heart failure  BNP elevated at 2000s. CXR with pulmonary edema.   Tolerated HD on admit day with removal of 3L.  Likely now with ischemic cardiomyopathy     TTE 02/05/2022  · Severe left atrial enlargement.  · The left ventricle is normal in size with mildly decreased systolic function.  · The estimated ejection fraction is 40%.  · There are segmental left ventricular wall motion abnormalities: apex, apical septum, mid and apical anterolateral, apical inferior and apical anterior wall.  · Grade II left ventricular diastolic dysfunction.  · Normal right ventricular size with normal right ventricular systolic function.  · There is mild aortic valve stenosis.  · Aortic valve area is 1.78 cm2; peak velocity is 0.71 m/s; mean gradient is 6 mmHg.  · Intermediate central venous pressure (8 mmHg).  · The estimated PA systolic  pressure is 19 mmHg.     Plan   - Fluid removal with HD  - Low salt diet   - Fluid restriction of 1500ml daily  - Continue GDMT and titrate as tolerated.         Type II diabetes mellitus  Controlled A1c 5.9.      - POCT glucose ACHS  - Maintain -180        Hyperlipidemia  Continue home statin        Hypertension  On home amlodipine 10mg, coreg 12.5mg BID, hydralazine 50mg TID    - Initiated metoprolol 25mg tartrate BID and losartan 25mg daily (held with Hyperkalemia). Titrate GDMT as tolerated  - HD as scheduled by nephrology    DVT ppx- Heparin gtt  CODE Status- FULL    Dispo- home pending Interventional Cardiology radhas    La Shah MD  Hospital Medicine Staff

## 2022-02-07 NOTE — PROGRESS NOTES
Called 2/6/22 with findings of new WMA and EF drop to 40%. Would evaluate for ischemia prior to pursuing PVI. If no obstructive disease, I would NOAH/CV and await recovery of EF prior to pursuing PVI. Will cancel PVI case for today and subsequent LAAO planned for 6w after.

## 2022-02-07 NOTE — OP NOTE
Brief Operative Note:    : Adam Rutherford MD     Referring Physician: Self,Aaareferral     All Operators: Surgeon(s):  MD Terry Hyman MD Partha Sardar, MD Kiran Garikapati, MD     Preoperative Diagnosis: NSTEMI (non-ST elevated myocardial infarction) [I21.4]     Postop Diagnosis: NSTEMI (non-ST elevated myocardial infarction) [I21.4]    Treatments/Procedures: Procedure(s) (LRB):  Angiogram, Coronary, with Left Heart Cath (N/A)    Access: Left radial artery    Findings:Left circumflex ostial 70% lesion present     See catheterization report for full details.    Intervention: Given the Anatomical position unable to Intervene. Recommend medical management     See catheterization report for full details.    Closure device: TR band        Plan:  - Post cath protocol    - Continue Plavix   - Continue high intensity statin therapy (LDL goal < 70)  - Risk factor reduction (BP <130/80 mmHg, glycemic control, etc)  - Follow up with outpatient cardiologist    Estimated Blood loss: 20 cc    Specimens removed: No    Kvng Howe MD  [facility]  Cardiovascular Disease, PGY-IV

## 2022-02-07 NOTE — PROGRESS NOTES
Patient arrived to dialysis unit via bed. Maintenance dialysis started via 15 gauge fistula needles to RFA fistula. Vascath to left wrist in place. No bleeding noted upon arrival with + pulse.  2 cc withdrawn from vascath with no bleeding noted.

## 2022-02-07 NOTE — PLAN OF CARE
[facility] - Telemetry Stepdown ([place])  Initial Discharge Assessment       Primary Care Provider: Virginia Foote MD    Admission Diagnosis: Shortness of breath [R06.02]  SOB (shortness of breath) [R06.02]  Chest pain [R07.9]    Admission Date: 2/4/2022  Expected Discharge Date: 2/8/2022         Payor: [employer] MANAGED MEDICARE / Plan: [employer] MEDICARE PLAN PPO / Product Type: Medicare Advantage /     Extended Emergency Contact Information  Primary Emergency Contact: Charan King  Address: [address] [place] [id number] [place]  Mobile Phone: [phone]  Relation: Son  Secondary Emergency Contact: Henry King   [place]  Mobile Phone: [phone]  Relation: Spouse    Discharge Plan A: (P) Home Health  Discharge Plan B: (P) Home      [facility] [place] - [address]  [address] [place] [address]  Phone: [phone] Fax: [phone]    [facility] [address] [address]  Phone: [phone] Fax: [phone]               SW completed Discharge Planning Assessment with patient via bedside. Discharge planning booklet given to patient/family and whiteboard updated with UMAIR and phone #. All questions answered.    Patient reported that she may need assistance with transportation upon discharge.     Patient reported that she lives alone; however, she has a sitter that comes during the day and a sitter that comes at night to assist her with her ADL's. Patient reported that she uses a walker at home. Patient reported that she goes to [facility] Tue, Thur, and Sat. Patient reported that she does not go to a Coumadin clinic.       Teressa Vegas LMSW  [facility]  Ext. [phone]

## 2022-02-07 NOTE — ASSESSMENT & PLAN NOTE
--C +/- PCI, patient is a DAINA candidate  - Anti-platelet Therapy: ASA / plavix   - Access: Left radial  - Catheters: Bright  - Creatinine/CrCl: 1.2  - Allergies: No shellfish / Iodine allergy  - Pre-Hydration: NS  - Pre-Op Med: Bendaryl 50mg pO   - All patient's questions were answered.  -The risks, benefits and alternatives of the procedure were explained to the patient.   -The risks of coronary angiography include but are not limited to: bleeding, infection, heart rhythm abnormalities, allergic reactions, kidney injury and potential need for dialysis, stroke and death.   - Should stenting be indicated, the patient has agreed to dual anti-platelet therapy for 1-consecutive year with a drug-eluting stent and a minimum of 1-month with the use of a bare metal stent  - Additionally, pt is aware that non-compliance is likely to result in stent clotting with heart attack, heart failure, and/or death  -The risks of moderate sedation include hypotension, respiratory depression, arrhythmias, bronchospasm, and death.   - Informed consent was obtained and the  patient is agreeable to proceed with the procedure.

## 2022-02-07 NOTE — PROGRESS NOTES
Arrived per bed. Pt states upon arrival that she is only dialyzing 2 hrs today. Dr Wolff notified.

## 2022-02-07 NOTE — PROGRESS NOTES
NEPHROLOGY HEMODIALYSIS NOTE    Kylie King is a 66 y.o. female currently on hemodialysis for removal of uremic toxins and volume management.     Patient seen and evaluated on hemodialysis, tolerating treatment, see HD flowsheet for vitals and assessments.    No Hypotension, chest pain, shortness of breath, cramping, nausea or vomiting.      Labs have been reviewed and the dialysate bath has been adjusted.    Labs:      Recent Labs   Lab 02/05/22  0606 02/05/22  0606 02/06/22  0432 02/06/22  0432 02/06/22  1157 02/06/22  1157 02/06/22  2110 02/06/22  2325 02/07/22  0523   *   < > 137   < > 137  --  134*  --  133*   K 4.9   < > 5.6*   < > 6.0*   < > 5.4* 5.4* 5.4*  5.4*      < > 105   < > 104  --  102  --  100   CO2 23   < > 20*   < > 19*  --  17*  --  18*   BUN 30*   < > 47*   < > 52*  --  60*  --  60*   CREATININE 5.3*   < > 6.7*   < > 7.2*  --  7.7*  --  8.0*   CALCIUM 9.3   < > 9.4   < > 9.2  --  9.3  --  8.9   PHOS 5.6*  --  6.7*  --   --   --   --   --  7.6*    < > = values in this interval not displayed.       Recent Labs   Lab 02/05/22  1959 02/06/22  0432 02/07/22  0523   WBC 5.26 4.75  4.75 5.88   HGB 9.1* 9.3*  9.3* 9.4*   HCT 29.6* 31.9*  31.9* 31.8*    185  185 213          Assessment/Plan:  - Seen on dialysis this morning, tolerating session with current   UFR, no complications.    - Ultrafiltration goal: 2-3 L   - potassium 5.4 should improve with HD patient agreed to do 3 hours of HD instead of 2 hours   - Will continue dialysis treatments while in-patient  - Continue to monitor intake and output   - Renally dose medications  - Pre/Post dialysis treatment weights  - phos is 7 c/w sevelamer 1600 TID   - Renal diet  - Will continue to follow closely.

## 2022-02-07 NOTE — PROGRESS NOTES
[facility] - Cath Lab  Cardiology  Progress Note    Patient Name: Kylie King  MRN: [id number]  Admission Date: 2/4/2022  Hospital Length of Stay: 0 days  Code Status: Full Code   Attending Physician: La Shah MD   Primary Care Physician: Virginia Foote MD  Expected Discharge Date: 2/8/2022  Principal Problem:Acute on chronic respiratory failure    Subjective:     Hospital Course:   No notes on file    Interval History: No acute events overnight. Patient reports generalized back and right shoulder pain which she attributes to laying in bed too much during hospitalization. Also reports some SOB. Denies CP, palpitations, nausea, emesis, vision changes.    Review of Systems   Constitutional: Negative for chills, diaphoresis, fever and malaise/fatigue.   HENT: Negative for congestion.    Eyes: Negative for visual disturbance.   Cardiovascular: Negative for chest pain, irregular heartbeat, leg swelling, orthopnea and palpitations.   Respiratory: Positive for shortness of breath. Negative for cough.    Gastrointestinal: Positive for abdominal pain (R flank).   Neurological: Negative for dizziness and headaches.   Psychiatric/Behavioral: Negative for altered mental status.     Objective:     Vital Signs (Most Recent):  Temp: 96.4 °F (35.8 °C) (02/07/22 0759)  Pulse: 97 (02/07/22 0759)  Resp: 16 (02/07/22 0759)  BP: 126/74 (02/07/22 0759)  SpO2: (!) 93 % (02/07/22 0759) Vital Signs (24h Range):  Temp:  [96.4 °F (35.8 °C)-98.5 °F (36.9 °C)] 96.4 °F (35.8 °C)  Pulse:  [60-97] 97  Resp:  [16-24] 16  SpO2:  [91 %-100 %] 93 %  BP: ()/(52-85) 126/74     Weight: 102.5 kg (226 lb)  Body mass index is 38.79 kg/m².     SpO2: (!) 93 %  O2 Device (Oxygen Therapy): nasal cannula    No intake or output data in the 24 hours ending 02/07/22 0957    Lines/Drains/Airways     Drain                 Hemodialysis AV Fistula Right forearm -- days         Hemodialysis AV Fistula 05/28/18 1436  1350 days          Airway                  Airway - Non-Surgical 11/22/21 1308 Nasal Cannula 76 days          Peripheral Intravenous Line                 Peripheral IV - Single Lumen 02/04/22 1358 22 G Left Upper Arm 2 days         Peripheral IV - Single Lumen 02/06/22 1600 20 G Anterior;Left Shoulder <1 day                Physical Exam    Significant Labs:   CMP   Recent Labs   Lab 02/06/22 0432 02/06/22 0432 02/06/22  1157 02/06/22  1157 02/06/22 2110 02/06/22 2325 02/07/22  0523      < > 137  --  134*  --  133*   K 5.6*   < > 6.0*   < > 5.4* 5.4* 5.4*  5.4*      < > 104  --  102  --  100   CO2 20*   < > 19*  --  17*  --  18*   *   < > 120*  --  72  --  78   BUN 47*   < > 52*  --  60*  --  60*   CREATININE 6.7*   < > 7.2*  --  7.7*  --  8.0*   CALCIUM 9.4   < > 9.2  --  9.3  --  8.9   PROT 7.2  --   --   --   --   --  7.1   ALBUMIN 3.5  --   --   --   --   --  3.5   BILITOT 0.7  --   --   --   --   --  0.8   ALKPHOS 118  --   --   --   --   --  113   AST 30  --   --   --   --   --  30   ALT 27  --   --   --   --   --  32   ANIONGAP 12   < > 14  --  15  --  15   ESTGFRAFRICA 6.8*   < > 6.2*  --  5.7*  --  5.5*   EGFRNONAA 5.9*   < > 5.4*  --  5.0*  --  4.7*    < > = values in this interval not displayed.   , CBC   Recent Labs   Lab 02/05/22 1959 02/05/22 1959 02/06/22 0432 02/06/22 0432 02/07/22  0523   WBC 5.26  --  4.75  4.75  --  5.88   HGB 9.1*  --  9.3*  9.3*  --  9.4*   HCT 29.6*   < > 31.9*  31.9*   < > 31.8*     --  185  185  --  213    < > = values in this interval not displayed.   , Troponin No results for input(s): TROPONINI in the last 48 hours. and All pertinent lab results from the last 24 hours have been reviewed.    Significant Imaging: Echocardiogram:   Transthoracic echo (TTE) complete (Cupid Only):   Results for orders placed or performed during the hospital encounter of 02/04/22   Echo   Result Value Ref Range    Ascending aorta 2.66 cm    STJ 2.53 cm    AV mean gradient 6 mmHg    Ao peak sheldon  0.71 m/s    Ao VTI 32.88 cm    IVRT 74.22 msec    IVS 0.98 0.6 - 1.1 cm    LA size 3.90 cm    Left Atrium Major Axis 6.76 cm    Left Atrium Minor Axis 6.43 cm    LVIDd 5.20 3.5 - 6.0 cm    LVIDs 3.60 2.1 - 4.0 cm    LVOT diameter 1.98 cm    LVOT peak VTI 18.97 cm    Posterior Wall 0.70 0.6 - 1.1 cm    MV Peak A Colten 0.84 m/s    E wave deceleration time 124.98 msec    MV Peak E Colten 1.07 m/s    RA Major Axis 4.83 cm    RA Width 4.22 cm    RVDD 3.94 cm    Sinus 2.92 cm    TAPSE 1.68 cm    TR Max Colten 1.68 m/s    TDI LATERAL 0.04 m/s    TDI SEPTAL 0.04 m/s    LA WIDTH 4.84 cm    MV stenosis pressure 1/2 time 36.24 ms    LV Diastolic Volume 83.47 mL    LV Systolic Volume 43.31 mL    RV S' 11.08 cm/s    LVOT peak colten 0.87 m/s    LA volume (mod) 73.33 cm3    LV LATERAL E/E' RATIO 26.75 m/s    LV SEPTAL E/E' RATIO 26.75 m/s    FS 31 %    LA volume 105.75 cm3    LV mass 154.56 g    Left Ventricle Relative Wall Thickness 0.27 cm    AV valve area 1.78 cm2    AV Velocity Ratio 1.23     AV index (prosthetic) 0.58     MV valve area p 1/2 method 6.07 cm2    E/A ratio 1.27     Mean e' 0.04 m/s    LVOT area 3.1 cm2    LVOT stroke volume 58.38 cm3    AV peak gradient 2 mmHg    E/E' ratio 26.75 m/s    LV Systolic Volume Index 21.0 mL/m2    LV Diastolic Volume Index 40.52 mL/m2    LA Volume Index 51.3 mL/m2    LV Mass Index 75 g/m2    Triscuspid Valve Regurgitation Peak Gradient 11 mmHg    LA Volume Index (Mod) 35.6 mL/m2    BSA 2.15 m2    Right Atrial Pressure (from IVC) 8 mmHg    EF 40 %    TV rest pulmonary artery pressure 19 mmHg    Narrative    · Severe left atrial enlargement.  · The left ventricle is normal in size with mildly decreased systolic   function.  · The estimated ejection fraction is 40%.  · There are segmental left ventricular wall motion abnormalities: apex,   apical septum, mid and apical anterolateral, apical inferior and apical   anterior wall.  · Grade II left ventricular diastolic dysfunction.  · Normal right  ventricular size with normal right ventricular systolic   function.  · There is mild aortic valve stenosis.  · Aortic valve area is 1.78 cm2; peak velocity is 0.71 m/s; mean gradient   is 6 mmHg.  · Intermediate central venous pressure (8 mmHg).  · The estimated PA systolic pressure is 19 mmHg.        Assessment and Plan:         NSTEMI (non-ST elevated myocardial infarction)  Patient has elevated troponin which peaked at 7.57 (latest 6.78) and new WMA in LAD and Lcx territory with a drop in EF from 60% to 40%.    Plan:  - Continue ACS protocol  - Cath today    Atrial fibrillation  History of persistent AF, follows with EP. Given history of CVA and inability to tolerate AADs, plan for PVI followed by Watchman device placement.    Plan:  - No PVI pending Cath. If no obstructive disease, NOAH/CV and wait for EF recovery before PVI  - Off amiodarone        VTE Risk Mitigation (From admission, onward)         Ordered     heparin (porcine) injection  As needed (PRN)         02/07/22 1000     heparin (porcine) injection 1,000 Units  As needed (PRN)         02/07/22 0858     heparin (porcine) 750 Units, verapamiL 1.25 mg, nitroGLYCERIN 1.25 mg in sodium chloride 0.9% 250 mL  As needed (PRN)         02/07/22 0938     heparin infusion 1,000 units/500 ml in 0.9% NaCl (on sterile field)  As needed (PRN)         02/07/22 0938     heparin 25,000 units in dextrose 5% (100 units/ml) IV bolus from bag - ADDITIONAL PRN BOLUS - 60 units/kg (max bolus 4000 units)  As needed (PRN)        Question:  Heparin Infusion Adjustment (DO NOT MODIFY ANSWER)  Answer:  [email or web address]\epic\Images\Pharmacy\HeparinInfusions\heparin LOW INTENSITY nomogram for [facility] [id number]    02/05/22 1918     heparin 25,000 units in dextrose 5% (100 units/ml) IV bolus from bag - ADDITIONAL PRN BOLUS - 30 units/kg (max bolus 4000 units)  As needed (PRN)        Question:  Heparin Infusion Adjustment (DO NOT MODIFY ANSWER)  Answer:   \\[email or web address]\epic\Images\Pharmacy\HeparinInfusions\heparin LOW INTENSITY nomogram for [facility] [id number]    02/05/22 1918     heparin 25,000 units in dextrose 5% 250 mL (100 units/mL) infusion LOW INTENSITY nomogram - [facility]  Continuous        Question Answer Comment   Heparin Infusion Adjustment (DO NOT MODIFY ANSWER) \\[email or web address]\epic\Images\Pharmacy\HeparinInfusions\heparin LOW INTENSITY nomogram for [facility] [id number]    Begin at (in units/kg/hr) 12        02/05/22 1918     IP VTE HIGH RISK PATIENT  Once         02/04/22 1508     Place sequential compression device  Until discontinued         02/04/22 1508     Reason for No Pharmacological VTE Prophylaxis  Once        Question:  Reasons:  Answer:  Already adequately anticoagulated on oral Anticoagulants    02/04/22 1508                Marco Antonio Landin MD  Cardiology  [facility] - Cath Lab

## 2022-02-07 NOTE — PT/OT/SLP PROGRESS
Occupational Therapy      Patient Name:  Kylie King   MRN:  [id number]    Pt not seen today secondary to pt off floor. OT treatment session attempted in AM and PM, however patient off floor in AM to cath lab, and in dialysis in PM. Will follow-up 2/8/2022.    2/7/2022

## 2022-02-07 NOTE — HPI
66-year-old morbidly obese female with past medical history of peripheral artery disease, insulin-dependent diabetes mellitus, carotid artery disease, CVA presents for acute shortness of breath to the hospital.  She was complaining of having shortness of breath on exertion but on Friday she felt acutely short of breath for which he presented to the emergency room.  She was found to have pulmonary edema.  Patient was unable to go to dialysis on Thursday as she was sick.  Her troponin peaked to 7.3 started downtrending.  Echo showed a drop in her EF to 40-45% with wall motion abnormalities involving in the LAD and circumflex territories in the mid and apical region.  Currently she denies having any chest pain, shortness of breath and able to lay flat.  She has angiogram of her left lower extremity with PTA to her left superficial artery last year.  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

## 2022-02-07 NOTE — PROGRESS NOTES
Dialysis completed. Needles removed from RFA fistula with pressure held to sites for 10 minutes each with hemostasis achieved. Gauze and tape applied. Patient refused to dialyze the ordered 3.5 hours, only agreed to dialyze for 3 hours. Dr. Vargas of nephrology aware. Patient dialyzed for 3 hours with 3 liters of fluid removal. Tolerated well with stable vital signs. 2 ml of air withdrawn gradually from Vascath on left wrist until deflated and removed. Positive pulse with no bleeding from site. Report given to primary nurse, Clarita. Patient returned to her room via bed.

## 2022-02-07 NOTE — NURSING
APTT 28.8 per order 4000 bolus given increased dose 3 units/kg/hr. Currently infusing 18u/kg/hr with rate of 13.3 next aPTT @ 4400

## 2022-02-07 NOTE — SUBJECTIVE & OBJECTIVE
Interval History: No acute events overnight. Patient reports generalized back and right shoulder pain which she attributes to laying in bed too much during hospitalization. Also reports some SOB. Denies CP, palpitations, nausea, emesis, vision changes.    Review of Systems   Constitutional: Negative for chills, diaphoresis, fever and malaise/fatigue.   HENT: Negative for congestion.    Eyes: Negative for visual disturbance.   Cardiovascular: Negative for chest pain, irregular heartbeat, leg swelling, orthopnea and palpitations.   Respiratory: Positive for shortness of breath. Negative for cough.    Gastrointestinal: Positive for abdominal pain (R flank).   Neurological: Negative for dizziness and headaches.   Psychiatric/Behavioral: Negative for altered mental status.     Objective:     Vital Signs (Most Recent):  Temp: 96.4 °F (35.8 °C) (02/07/22 0759)  Pulse: 97 (02/07/22 0759)  Resp: 16 (02/07/22 0759)  BP: 126/74 (02/07/22 0759)  SpO2: (!) 93 % (02/07/22 0759) Vital Signs (24h Range):  Temp:  [96.4 °F (35.8 °C)-98.5 °F (36.9 °C)] 96.4 °F (35.8 °C)  Pulse:  [60-97] 97  Resp:  [16-24] 16  SpO2:  [91 %-100 %] 93 %  BP: ()/(52-85) 126/74     Weight: 102.5 kg (226 lb)  Body mass index is 38.79 kg/m².     SpO2: (!) 93 %  O2 Device (Oxygen Therapy): nasal cannula    No intake or output data in the 24 hours ending 02/07/22 0957    Lines/Drains/Airways     Drain                 Hemodialysis AV Fistula Right forearm -- days         Hemodialysis AV Fistula 05/28/18 1436  1350 days          Airway                 Airway - Non-Surgical 11/22/21 1308 Nasal Cannula 76 days          Peripheral Intravenous Line                 Peripheral IV - Single Lumen 02/04/22 1358 22 G Left Upper Arm 2 days         Peripheral IV - Single Lumen 02/06/22 1600 20 G Anterior;Left Shoulder <1 day                Physical Exam    Significant Labs:   CMP   Recent Labs   Lab 02/06/22  0432 02/06/22  0432 02/06/22  1157 02/06/22  1157  02/06/22 2110 02/06/22 2325 02/07/22  0523      < > 137  --  134*  --  133*   K 5.6*   < > 6.0*   < > 5.4* 5.4* 5.4*  5.4*      < > 104  --  102  --  100   CO2 20*   < > 19*  --  17*  --  18*   *   < > 120*  --  72  --  78   BUN 47*   < > 52*  --  60*  --  60*   CREATININE 6.7*   < > 7.2*  --  7.7*  --  8.0*   CALCIUM 9.4   < > 9.2  --  9.3  --  8.9   PROT 7.2  --   --   --   --   --  7.1   ALBUMIN 3.5  --   --   --   --   --  3.5   BILITOT 0.7  --   --   --   --   --  0.8   ALKPHOS 118  --   --   --   --   --  113   AST 30  --   --   --   --   --  30   ALT 27  --   --   --   --   --  32   ANIONGAP 12   < > 14  --  15  --  15   ESTGFRAFRICA 6.8*   < > 6.2*  --  5.7*  --  5.5*   EGFRNONAA 5.9*   < > 5.4*  --  5.0*  --  4.7*    < > = values in this interval not displayed.   , CBC   Recent Labs   Lab 02/05/22 1959 02/05/22 1959 02/06/22 0432 02/06/22  0432 02/07/22  0523   WBC 5.26  --  4.75  4.75  --  5.88   HGB 9.1*  --  9.3*  9.3*  --  9.4*   HCT 29.6*   < > 31.9*  31.9*   < > 31.8*     --  185  185  --  213    < > = values in this interval not displayed.   , Troponin No results for input(s): TROPONINI in the last 48 hours. and All pertinent lab results from the last 24 hours have been reviewed.    Significant Imaging: Echocardiogram:   Transthoracic echo (TTE) complete (Cupid Only):   Results for orders placed or performed during the hospital encounter of 02/04/22   Echo   Result Value Ref Range    Ascending aorta 2.66 cm    STJ 2.53 cm    AV mean gradient 6 mmHg    Ao peak colten 0.71 m/s    Ao VTI 32.88 cm    IVRT 74.22 msec    IVS 0.98 0.6 - 1.1 cm    LA size 3.90 cm    Left Atrium Major Axis 6.76 cm    Left Atrium Minor Axis 6.43 cm    LVIDd 5.20 3.5 - 6.0 cm    LVIDs 3.60 2.1 - 4.0 cm    LVOT diameter 1.98 cm    LVOT peak VTI 18.97 cm    Posterior Wall 0.70 0.6 - 1.1 cm    MV Peak A Colten 0.84 m/s    E wave deceleration time 124.98 msec    MV Peak E Colten 1.07 m/s    RA Major [place]  4.83 cm    RA Width 4.22 cm    RVDD 3.94 cm    Sinus 2.92 cm    TAPSE 1.68 cm    TR Max Colten 1.68 m/s    TDI LATERAL 0.04 m/s    TDI SEPTAL 0.04 m/s    LA WIDTH 4.84 cm    MV stenosis pressure 1/2 time 36.24 ms    LV Diastolic Volume 83.47 mL    LV Systolic Volume 43.31 mL    RV S' 11.08 cm/s    LVOT peak colten 0.87 m/s    LA volume (mod) 73.33 cm3    LV LATERAL E/E' RATIO 26.75 m/s    LV SEPTAL E/E' RATIO 26.75 m/s    FS 31 %    LA volume 105.75 cm3    LV mass 154.56 g    Left Ventricle Relative Wall Thickness 0.27 cm    AV valve area 1.78 cm2    AV Velocity Ratio 1.23     AV index (prosthetic) 0.58     MV valve area p 1/2 method 6.07 cm2    E/A ratio 1.27     Mean e' 0.04 m/s    LVOT area 3.1 cm2    LVOT stroke volume 58.38 cm3    AV peak gradient 2 mmHg    E/E' ratio 26.75 m/s    LV Systolic Volume Index 21.0 mL/m2    LV Diastolic Volume Index 40.52 mL/m2    LA Volume Index 51.3 mL/m2    LV Mass Index 75 g/m2    Triscuspid Valve Regurgitation Peak Gradient 11 mmHg    LA Volume Index (Mod) 35.6 mL/m2    BSA 2.15 m2    Right Atrial Pressure (from IVC) 8 mmHg    EF 40 %    TV rest pulmonary artery pressure 19 mmHg    Narrative    · Severe left atrial enlargement.  · The left ventricle is normal in size with mildly decreased systolic   function.  · The estimated ejection fraction is 40%.  · There are segmental left ventricular wall motion abnormalities: apex,   apical septum, mid and apical anterolateral, apical inferior and apical   anterior wall.  · Grade II left ventricular diastolic dysfunction.  · Normal right ventricular size with normal right ventricular systolic   function.  · There is mild aortic valve stenosis.  · Aortic valve area is 1.78 cm2; peak velocity is 0.71 m/s; mean gradient   is 6 mmHg.  · Intermediate central venous pressure (8 mmHg).  · The estimated PA systolic pressure is 19 mmHg.

## 2022-02-07 NOTE — NURSING
POCT glucose 49, patient asymptomatic, gave 24 g glucose tablet, repeat POCT glucose 115. Will continue to monitor.

## 2022-02-07 NOTE — CONSULTS
[facility] - Telemetry Stepdown ([place] [address])  Interventional Cardiology  Consult Note    Patient Name: Kylie King  MRN: [id number]  Admission Date: 2/4/2022  Hospital Length of Stay: 0 days  Code Status: Full Code   Attending Provider: La Shah MD  Consulting Provider: Kvng Howe MD  Primary Care Physician: Virginia Foote MD  Principal Problem:Acute on chronic respiratory failure    Patient information was obtained from patient and ER records.     Consults  Subjective:     Chief Complaint:  Elevated troponin      HPI:  66-year-old morbidly obese female with past medical history of peripheral artery disease, insulin-dependent diabetes mellitus, carotid artery disease, CVA presents for acute shortness of breath to the hospital.  She was complaining of having shortness of breath on exertion but on Friday she felt acutely short of breath for which he presented to the emergency room.  She was found to have pulmonary edema.  Patient was unable to go to dialysis on Thursday as she was sick.  Her troponin peaked to 7.3 started downtrending.  Echo showed a drop in her EF to 40-45% with wall motion abnormalities involving in the LAD and circumflex territories in the mid and apical region.  Currently she denies having any chest pain, shortness of breath and able to lay flat.  She has angiogram of her left lower extremity with PTA to her left superficial artery last year.  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       Past Medical History:   Diagnosis Date    Anxiety     Breast cancer     left    Carotid artery disease     Cataract     Choledocholithiasis     s/p ERCP and stent placement    Chronic diastolic CHF (congestive heart failure)     Chronic obstructive pulmonary disease     Chronic venous insufficiency     Depression     End-stage renal disease on hemodialysis     M/W/F    GERD (gastroesophageal reflux disease)     History of breast cancer      History of colon cancer 2001    s/p sigmoid colectomy    History of kidney stones     History of osteomyelitis of left foot     History of pancreatitis     History of stroke     Hyperlipidemia     Hypertension     Intracerebral hemorrhage     Lumbar degenerative disc disease     Lumbar spinal stenosis     Morbid obesity     Paroxysmal atrial fibrillation     Peripheral artery disease     Peripheral neuropathy     Tobacco dependence     Type II diabetes mellitus     Urinary incontinence        Past Surgical History:   Procedure Laterality Date    ANGIOGRAPHY OF LOWER EXTREMITY Right 9/17/2020    Procedure: Angiogram Extremity Unilateral;  Surgeon: RICK Pollard III, MD;  Location: [facility] OR [address] [place] [address];  Service: Peripheral Vascular;  Laterality: Right;  6.2 minutes of fluro  690.88 mGy  112.64 Gycm2  55 ml    ANGIOGRAPHY OF LOWER EXTREMITY Left 5/13/2021    Procedure: Angiogram Extremity Unilateral;  Surgeon: HOMERO Hayden II, MD;  Location: [facility] OR [address] [place] [address];  Service: Vascular;  Laterality: Left;  35.1 min  971.41 mGy  190.27 Gy.cm  101ml Dye    ANGIOGRAPHY OF LOWER EXTREMITY Right 7/19/2021    Procedure: Angiogram Extremity Unilateral;  Surgeon: HOMERO Hayden II, MD;  Location: [facility] OR [address] [place] [address];  Service: Vascular;  Laterality: Right;  3.0 min  121.00 mGy  26.8713 Gy.cm  13ml Dye    AORTOGRAPHY N/A 5/13/2021    Procedure: AORTOGRAM;  Surgeon: HOMERO Hayden II, MD;  [facility];  Service: Vascular;  Laterality: N/A;    AV FISTULA PLACEMENT Right 5/28/2018    Procedure: CREATION -FISTULA-AV;  Surgeon: RICK Polladr III, MD;  Location: [facility] OR [address] [place] [address];  Service: Peripheral Vascular;  Laterality: Right;    BREAST BIOPSY  1/2012    left breast invasive mammary carcinoma (lateral bx) and intraductal papilloma (medial bx)    CARDIOVERSION  11/22/2021    CARDIOVERSION  11/22/2021    Procedure: Cardioversion;  Surgeon: Ad Garcia MD;  Location: [facility] CATH LAB;  Service:  Cardiology;;    CATARACT EXTRACTION W/  INTRAOCULAR LENS IMPLANT Right 1/24/2019        CATARACT EXTRACTION W/  INTRAOCULAR LENS IMPLANT Left 1/31/2019    Procedure: EXTRACTION, CATARACT, WITH IOL INSERTION;  Surgeon: Immanuel Moncada MD;  Location: [facility] OR;  Service: Ophthalmology;  Laterality: Left;    CHOLECYSTECTOMY  2001    DEBRIDEMENT OF FOOT Right 2/17/2021    Procedure: DEBRIDEMENT, FOOT right plantar ulcer;  Surgeon: Sonja Corral DPM;  Location: [facility] OR;  Service: Podiatry;  Laterality: Right;    ERCP W/ SPHICTEROTOMY      FINE NEEDLE ASPIRATION  1999    Right breast    FOOT AMPUTATION Left 6/1/2021    Procedure: Transmetatarsal amputation;  Surgeon: Billy Robles DPM;  [facility];  Service: Podiatry;  Laterality: Left;    FOOT AMPUTATION Left 7/23/2021    Procedure: AMPUTATION, FOOT;  Surgeon: Billy Robles DPM;  [facility];  Service: Podiatry;  Laterality: Left;    FOOT AMPUTATION THROUGH METATARSAL Right 10/15/2020    Procedure: PARTIAL RAY AMPUTATION GREAT TOE;  Surgeon: Billy Robles DPM;  [facility];  Service: Podiatry;  Laterality: Right;  Stretcher OK    HERNIA REPAIR      LAPAROSCOPIC NEPHRECTOMY Left 2011    MASTECTOMY  2013    left    OOPHORECTOMY Left 2001    REVISION OF ARTERIOVENOUS FISTULA Right 8/15/2018    Procedure: REVISION, AV FISTULA;  Surgeon: RICK Pollard III, MD;  [facility];  Service: Peripheral Vascular;  Laterality: Right;    SIGMOIDECTOMY  2001    SURGICAL REMOVAL OF METATARSAL HEAD Right 2/17/2021    Procedure: OSTECTOMY, METATARSAL BONE, diatal 1st;  Surgeon: Sonja Corral DPM;  Location: [facility];  Service: Podiatry;  Laterality: Right;    TOE AMPUTATION Left 5/13/2021    Procedure: AMPUTATION, TOE;  Surgeon: HOMERO Hayden II, MD;  [facility];  Service: Vascular;  Laterality: Left;    TOTAL REDUCTION MAMMOPLASTY Right 2013       Review of patient's allergies  indicates:   Allergen Reactions    Cyclobenzaprine Hallucinations    Erythromycin      Stomach upset    Keflex [cephalexin]      Yeast infection    Erythromycin base      Other reaction(s): upset stomach       PTA Medications   Medication Sig    acetaminophen-codeine 300-30mg (TYLENOL #3) 300-30 mg Tab Take 1 tablet by mouth every 8 (eight) hours as needed.    albuterol (PROVENTIL/VENTOLIN HFA) 90 mcg/actuation inhaler Inhale 2 puffs into the lungs every 6 (six) hours as needed for Wheezing. Rescue    amiodarone (PACERONE) 200 MG Tab Take 1 tablet (200 mg total) by mouth once daily. (Patient not taking: Reported on 1/11/2022)    amLODIPine (NORVASC) 10 MG tablet Take 1 tablet (10 mg total) by mouth once daily.    atorvastatin (LIPITOR) 40 MG tablet Take 1 tablet (40 mg total) by mouth once daily.    atorvastatin (LIPITOR) 40 MG tablet 1 tablet DAILY (route: oral)    benzonatate (TESSALON) 100 MG capsule Take 1 capsule (100 mg total) by mouth 3 (three) times daily as needed for Cough. (Patient not taking: Reported on 1/11/2022)    blood sugar diagnostic Strp 1 strip by Misc.(Non-Drug; Combo Route) route 4 (four) times daily.    blood-glucose sensor (DEXCOM G6 SENSOR) Umm 1 each by Misc.(Non-Drug; Combo Route) route every 10 days.    blood-glucose transmitter (DEXCOM G6 TRANSMITTER) Umm 1 Device by Misc.(Non-Drug; Combo Route) route continuous.    carvediloL (COREG) 12.5 MG tablet Take 1 tablet (12.5 mg total) by mouth 2 (two) times daily with meals.    citalopram (CELEXA) 20 MG tablet Take 1 tablet (20 mg total) by mouth nightly.    cloNIDine (CATAPRES) 0.1 MG tablet 1 tablet EVERY 8 HOURS (route: oral)    clopidogreL (PLAVIX) 75 mg tablet Take 1 tablet (75 mg total) by mouth once daily.    famotidine (PEPCID) 20 MG tablet Take 1 tablet (20 mg total) by mouth nightly as needed.    fish oil-omega-3 fatty acids 300-1,000 mg capsule Take 1 capsule by mouth every morning.    flash glucose scanning  reader (FREESTYLE LULU 14 DAY READER) Misc Use as directed to check blood sugars    flash glucose scanning reader (FREESTYLE LULU 2 READER) Misc 1 Units by Misc.(Non-Drug; Combo Route) route continuous prn.    flash glucose sensor (FREESTYLE LULU 14 DAY SENSOR) Kit Use as directed to check blood sugars    flash glucose sensor (FREESTYLE LULU 2 SENSOR) Kit 1 Units by Misc.(Non-Drug; Combo Route) route every 14 (fourteen) days.    gabapentin (NEURONTIN) 300 MG capsule Take 1 capsule (300 mg total) by mouth every evening.    guaiFENesin (MUCINEX) 600 mg 12 hr tablet Take 1 tablet (600 mg total) by mouth 2 (two) times daily.    hydrALAZINE (APRESOLINE) 50 MG tablet Take 1 tablet (50 mg total) by mouth every 8 (eight) hours. (Patient taking differently: Take 100 mg by mouth 3 (three) times daily.)    insulin aspart U-100 (NOVOLOG) 100 unit/mL (3 mL) InPn pen Inject 3 Units into the skin 3 (three) times daily with meals.    insulin detemir U-100 (LEVEMIR FLEXTOUCH) 100 unit/mL (3 mL) SubQ InPn pen Inject 4 Units into the skin 2 (two) times daily.    lancets (ONETOUCH DELICA LANCETS) 33 gauge Misc 1 lancet by Misc.(Non-Drug; Combo Route) route 4 (four) times daily before meals and nightly.    letrozole (FEMARA) 2.5 mg Tab Take 1 tablet (2.5 mg total) by mouth nightly.    methocarbamoL (ROBAXIN) 500 MG Tab Take 500 mg by mouth 3 (three) times daily.    senna-docusate 8.6-50 mg (PERICOLACE) 8.6-50 mg per tablet Take 1 tablet by mouth 2 (two) times daily.    sevelamer carbonate (RENVELA) 800 mg Tab Take 2 tablets (1,600 mg total) by mouth 3 (three) times daily with meals.     Family History     Problem Relation (Age of Onset)    Arthritis Mother    Breast cancer Maternal Grandmother    Cancer Maternal Grandmother, Maternal Aunt    Celiac disease Sister    Diabetes Father    Heart disease Mother, Father, Maternal Grandmother        Tobacco Use    Smoking status: Current Some Day Smoker     Packs/day: 0.50      Years: 28.00     Pack years: 14.00     Types: Cigarettes     Start date: 1990     Last attempt to quit: 2018     Years since quittin.1    Smokeless tobacco: Never Used    Tobacco comment: anxious   Substance and Sexual Activity    Alcohol use: No    Drug use: No    Sexual activity: Not Currently     Partners: Male     Review of Systems   Constitutional: Negative.   HENT: Negative.    Eyes: Negative.    Cardiovascular: Negative.    Respiratory: Positive for shortness of breath.    Endocrine: Negative.    Hematologic/Lymphatic: Negative.    Skin: Negative.    Musculoskeletal: Negative.    Gastrointestinal: Negative.    Genitourinary: Negative.    Neurological: Negative.      Objective:     Vital Signs (Most Recent):  Temp: 96.4 °F (35.8 °C) (22)  Pulse: 97 (22)  Resp: 16 (22)  BP: 126/74 (22)  SpO2: (!) 93 % (22) Vital Signs (24h Range):  Temp:  [96.4 °F (35.8 °C)-98.5 °F (36.9 °C)] 96.4 °F (35.8 °C)  Pulse:  [60-97] 97  Resp:  [16-24] 16  SpO2:  [91 %-100 %] 93 %  BP: ()/(52-85) 126/74     Weight: 102.5 kg (226 lb)  Body mass index is 38.79 kg/m².    SpO2: (!) 93 %  O2 Device (Oxygen Therapy): nasal cannula    No intake or output data in the 24 hours ending 22 0837    Lines/Drains/Airways     Drain                 Hemodialysis AV Fistula Right forearm -- days         Hemodialysis AV Fistula 18 1436  1350 days          Airway                 Airway - Non-Surgical 21 1308 Nasal Cannula 76 days          Peripheral Intravenous Line                 Peripheral IV - Single Lumen 22 1358 22 G Left Upper Arm 2 days         Peripheral IV - Single Lumen 22 1600 20 G Anterior;Left Shoulder <1 day                Physical Exam  Constitutional:       Appearance: Normal appearance.   HENT:      Head: Normocephalic.      Nose: Nose normal.   Eyes:      Extraocular Movements: Extraocular movements intact.      Pupils: Pupils are  equal, round, and reactive to light.   Cardiovascular:      Rate and Rhythm: Normal rate and regular rhythm.   Pulmonary:      Effort: Pulmonary effort is normal.      Breath sounds: Normal breath sounds.   Abdominal:      General: Abdomen is flat. Bowel sounds are normal.      Palpations: Abdomen is soft.   Musculoskeletal:         General: Normal range of motion.      Cervical back: Normal range of motion.      Comments: Unable to appreciate the bilateral lower extremity pedal pulses.   Skin:     General: Skin is warm.      Capillary Refill: Capillary refill takes less than 2 seconds.   Neurological:      General: No focal deficit present.      Mental Status: She is alert and oriented to person, place, and time.         Significant Labs: All pertinent lab results from the last 24 hours have been reviewed.        Assessment and Plan:     NSTEMI (non-ST elevated myocardial infarction)  --LHC +/- PCI, patient is a DAINA candidate  - Anti-platelet Therapy: ASA / plavix   - Access: Left radial  - Catheters: Bright  - Creatinine/CrCl: 1.2  - Allergies: No shellfish / Iodine allergy  - Pre-Hydration: NS  - Pre-Op Med: Bendaryl 50mg pO   - All patient's questions were answered.  -The risks, benefits and alternatives of the procedure were explained to the patient.   -The risks of coronary angiography include but are not limited to: bleeding, infection, heart rhythm abnormalities, allergic reactions, kidney injury and potential need for dialysis, stroke and death.   - Should stenting be indicated, the patient has agreed to dual anti-platelet therapy for 1-consecutive year with a drug-eluting stent and a minimum of 1-month with the use of a bare metal stent  - Additionally, pt is aware that non-compliance is likely to result in stent clotting with heart attack, heart failure, and/or death  -The risks of moderate sedation include hypotension, respiratory depression, arrhythmias, bronchospasm, and death.   - Informed consent was  obtained and the  patient is agreeable to proceed with the procedure.          VTE Risk Mitigation (From admission, onward)         Ordered     heparin 25,000 units in dextrose 5% (100 units/ml) IV bolus from bag - ADDITIONAL PRN BOLUS - 60 units/kg (max bolus 4000 units)  As needed (PRN)        Question:  Heparin Infusion Adjustment (DO NOT MODIFY ANSWER)  Answer:  [email or web address]\epic\Images\Pharmacy\HeparinInfusions\heparin LOW INTENSITY nomogram for [facility] [id number].pdf    02/05/22 1918     heparin 25,000 units in dextrose 5% (100 units/ml) IV bolus from bag - ADDITIONAL PRN BOLUS - 30 units/kg (max bolus 4000 units)  As needed (PRN)        Question:  Heparin Infusion Adjustment (DO NOT MODIFY ANSWER)  Answer:  [email or web address]\epic\Images\Pharmacy\HeparinInfusions\heparin LOW INTENSITY nomogram for [facility] [id number].pdf    02/05/22 1918     heparin 25,000 units in dextrose 5% 250 mL (100 units/mL) infusion LOW INTENSITY nomogram - [facility]  Continuous        Question Answer Comment   Heparin Infusion Adjustment (DO NOT MODIFY ANSWER) \\[email or web address]\epic\Images\Pharmacy\HeparinInfusions\heparin LOW INTENSITY nomogram for [facility] [id number].pdf    Begin at (in units/kg/hr) 12        02/05/22 1918     IP VTE HIGH RISK PATIENT  Once         02/04/22 1508     Place sequential compression device  Until discontinued         02/04/22 1508     Reason for No Pharmacological VTE Prophylaxis  Once        Question:  Reasons:  Answer:  Already adequately anticoagulated on oral Anticoagulants    02/04/22 1508                Thank you for your consult. I will follow-up with patient. Please contact us if you have any additional questions.    Kvng Howe MD  Interventional Cardiology   [facility] - Telemetry Stepdown ([place]

## 2022-02-07 NOTE — NURSING
2cc removed from L radial vascath. No bleeding, n/t noted. +2 pulse present. Gilles RN from dialysis notified that pt still has vas cath in place. Pt going down to dialysis with lunch tray.

## 2022-02-07 NOTE — NURSING
Pt transferred from cath lab. Vascath in place. No bleeding noted, 2+ radial bilses maximilian, no n/t present. Will begin to decompress vascath at 1230 per cath lab rn. Will monitor closely

## 2022-02-07 NOTE — SUBJECTIVE & OBJECTIVE
Past Medical History:   Diagnosis Date    Anxiety     Breast cancer     left    Carotid artery disease     Cataract     Choledocholithiasis     s/p ERCP and stent placement    Chronic diastolic CHF (congestive heart failure)     Chronic obstructive pulmonary disease     Chronic venous insufficiency     Depression     End-stage renal disease on hemodialysis     M/W/F    GERD (gastroesophageal reflux disease)     History of breast cancer     History of colon cancer 2001    s/p sigmoid colectomy    History of kidney stones     History of osteomyelitis of left foot     History of pancreatitis     History of stroke     Hyperlipidemia     Hypertension     Intracerebral hemorrhage     Lumbar degenerative disc disease     Lumbar spinal stenosis     Morbid obesity     Paroxysmal atrial fibrillation     Peripheral artery disease     Peripheral neuropathy     Tobacco dependence     Type II diabetes mellitus     Urinary incontinence        Past Surgical History:   Procedure Laterality Date    ANGIOGRAPHY OF LOWER EXTREMITY Right 9/17/2020    Procedure: Angiogram Extremity Unilateral;  Surgeon: RICK Pollard III, MD;  Location: [facility] OR [address] [place] [address];  Service: Peripheral Vascular;  Laterality: Right;  6.2 minutes of fluro  690.88 mGy  112.64 Gycm2  55 ml    ANGIOGRAPHY OF LOWER EXTREMITY Left 5/13/2021    Procedure: Angiogram Extremity Unilateral;  Surgeon: HOMERO Hayden II, MD;  Location: [facility] OR [address] [place] [address];  Service: Vascular;  Laterality: Left;  35.1 min  971.41 mGy  190.27 Gy.cm  101ml Dye    ANGIOGRAPHY OF LOWER EXTREMITY Right 7/19/2021    Procedure: Angiogram Extremity Unilateral;  Surgeon: HOMERO Hayden II, MD;  Location: [facility] OR [address] [place] [address];  Service: Vascular;  Laterality: Right;  3.0 min  121.00 mGy  26.8713 Gy.cm  13ml Dye    AORTOGRAPHY N/A 5/13/2021    Procedure: AORTOGRAM;  Surgeon: HOMERO Hayden II, MD;  Location: [facility] OR [address] [place] [address];  Service: Vascular;  Laterality: N/A;    AV  FISTULA PLACEMENT Right 5/28/2018    Procedure: CREATION -FISTULA-AV;  Surgeon: RICK Pollard III, MD;  [facility];  Service: Peripheral Vascular;  Laterality: Right;    BREAST BIOPSY  1/2012    left breast invasive mammary carcinoma (lateral bx) and intraductal papilloma (medial bx)    CARDIOVERSION  11/22/2021    CARDIOVERSION  11/22/2021    Procedure: Cardioversion;  Surgeon: Ad Garcia MD;  Location: [facility] CATH LAB;  Service: Cardiology;;    CATARACT EXTRACTION W/  INTRAOCULAR LENS IMPLANT Right 1/24/2019        CATARACT EXTRACTION W/  INTRAOCULAR LENS IMPLANT Left 1/31/2019    Procedure: EXTRACTION, CATARACT, WITH IOL INSERTION;  Surgeon: Immanuel Moncada MD;  Location: [facility] OR;  Service: Ophthalmology;  Laterality: Left;    CHOLECYSTECTOMY  2001    DEBRIDEMENT OF FOOT Right 2/17/2021    Procedure: DEBRIDEMENT, FOOT right plantar ulcer;  Surgeon: Sonja Corral DPM;  Location: [facility] OR;  Service: Podiatry;  Laterality: Right;    ERCP W/ SPHICTEROTOMY      FINE NEEDLE ASPIRATION  1999    Right breast    FOOT AMPUTATION Left 6/1/2021    Procedure: Transmetatarsal amputation;  Surgeon: Billy Robles DPM;  [facility];  Service: Podiatry;  Laterality: Left;    FOOT AMPUTATION Left 7/23/2021    Procedure: AMPUTATION, FOOT;  Surgeon: Billy Robles DPM;  [facility];  Service: Podiatry;  Laterality: Left;    FOOT AMPUTATION THROUGH METATARSAL Right 10/15/2020    Procedure: PARTIAL RAY AMPUTATION GREAT TOE;  Surgeon: Billy Robles DPM;  [facility];  Service: Podiatry;  Laterality: Right;  Stretcher OK    HERNIA REPAIR      LAPAROSCOPIC NEPHRECTOMY Left 2011    MASTECTOMY  2013    left    OOPHORECTOMY Left 2001    REVISION OF ARTERIOVENOUS FISTULA Right 8/15/2018    Procedure: REVISION, AV FISTULA;  Surgeon: RICK Pollard III, MD;  [facility];  Service: Peripheral Vascular;  Laterality: Right;    SIGMOIDECTOMY   2001    SURGICAL REMOVAL OF METATARSAL HEAD Right 2/17/2021    Procedure: OSTECTOMY, METATARSAL BONE, diatal 1st;  Surgeon: Sonja Corral DPM;  Location: [facility] OR;  Service: Podiatry;  Laterality: Right;    TOE AMPUTATION Left 5/13/2021    Procedure: AMPUTATION, TOE;  Surgeon: HOMERO Hayden II, MD;  [facility];  Service: Vascular;  Laterality: Left;    TOTAL REDUCTION MAMMOPLASTY Right 2013       Review of patient's allergies indicates:   Allergen Reactions    Cyclobenzaprine Hallucinations    Erythromycin      Stomach upset    Keflex [cephalexin]      Yeast infection    Erythromycin base      Other reaction(s): upset stomach       PTA Medications   Medication Sig    acetaminophen-codeine 300-30mg (TYLENOL #3) 300-30 mg Tab Take 1 tablet by mouth every 8 (eight) hours as needed.    albuterol (PROVENTIL/VENTOLIN HFA) 90 mcg/actuation inhaler Inhale 2 puffs into the lungs every 6 (six) hours as needed for Wheezing. Rescue    amiodarone (PACERONE) 200 MG Tab Take 1 tablet (200 mg total) by mouth once daily. (Patient not taking: Reported on 1/11/2022)    amLODIPine (NORVASC) 10 MG tablet Take 1 tablet (10 mg total) by mouth once daily.    atorvastatin (LIPITOR) 40 MG tablet Take 1 tablet (40 mg total) by mouth once daily.    atorvastatin (LIPITOR) 40 MG tablet 1 tablet DAILY (route: oral)    benzonatate (TESSALON) 100 MG capsule Take 1 capsule (100 mg total) by mouth 3 (three) times daily as needed for Cough. (Patient not taking: Reported on 1/11/2022)    blood sugar diagnostic Strp 1 strip by Misc.(Non-Drug; Combo Route) route 4 (four) times daily.    blood-glucose sensor (DEXCOM G6 SENSOR) Umm 1 each by Misc.(Non-Drug; Combo Route) route every 10 days.    blood-glucose transmitter (DEXCOM G6 TRANSMITTER) Umm 1 Device by Misc.(Non-Drug; Combo Route) route continuous.    carvediloL (COREG) 12.5 MG tablet Take 1 tablet (12.5 mg total) by mouth 2 (two) times daily with meals.     citalopram (CELEXA) 20 MG tablet Take 1 tablet (20 mg total) by mouth nightly.    cloNIDine (CATAPRES) 0.1 MG tablet 1 tablet EVERY 8 HOURS (route: oral)    clopidogreL (PLAVIX) 75 mg tablet Take 1 tablet (75 mg total) by mouth once daily.    famotidine (PEPCID) 20 MG tablet Take 1 tablet (20 mg total) by mouth nightly as needed.    fish oil-omega-3 fatty acids 300-1,000 mg capsule Take 1 capsule by mouth every morning.    flash glucose scanning reader (FREESTYLE LULU 14 DAY READER) Misc Use as directed to check blood sugars    flash glucose scanning reader (FREESTYLE LULU 2 READER) Misc 1 Units by Misc.(Non-Drug; Combo Route) route continuous prn.    flash glucose sensor (FREESTYLE LULU 14 DAY SENSOR) Kit Use as directed to check blood sugars    flash glucose sensor (FREESTYLE LULU 2 SENSOR) Kit 1 Units by Misc.(Non-Drug; Combo Route) route every 14 (fourteen) days.    gabapentin (NEURONTIN) 300 MG capsule Take 1 capsule (300 mg total) by mouth every evening.    guaiFENesin (MUCINEX) 600 mg 12 hr tablet Take 1 tablet (600 mg total) by mouth 2 (two) times daily.    hydrALAZINE (APRESOLINE) 50 MG tablet Take 1 tablet (50 mg total) by mouth every 8 (eight) hours. (Patient taking differently: Take 100 mg by mouth 3 (three) times daily.)    insulin aspart U-100 (NOVOLOG) 100 unit/mL (3 mL) InPn pen Inject 3 Units into the skin 3 (three) times daily with meals.    insulin detemir U-100 (LEVEMIR FLEXTOUCH) 100 unit/mL (3 mL) SubQ InPn pen Inject 4 Units into the skin 2 (two) times daily.    lancets (ONETOUCH DELICA LANCETS) 33 gauge Misc 1 lancet by Misc.(Non-Drug; Combo Route) route 4 (four) times daily before meals and nightly.    letrozole (FEMARA) 2.5 mg Tab Take 1 tablet (2.5 mg total) by mouth nightly.    methocarbamoL (ROBAXIN) 500 MG Tab Take 500 mg by mouth 3 (three) times daily.    senna-docusate 8.6-50 mg (PERICOLACE) 8.6-50 mg per tablet Take 1 tablet by mouth 2 (two) times daily.     sevelamer carbonate (RENVELA) 800 mg Tab Take 2 tablets (1,600 mg total) by mouth 3 (three) times daily with meals.     Family History     Problem Relation (Age of Onset)    Arthritis Mother    Breast cancer Maternal Grandmother    Cancer Maternal Grandmother, Maternal Aunt    Celiac disease Sister    Diabetes Father    Heart disease Mother, Father, Maternal Grandmother        Tobacco Use    Smoking status: Current Some Day Smoker     Packs/day: 0.50     Years: 28.00     Pack years: 14.00     Types: Cigarettes     Start date: 1990     Last attempt to quit: 2018     Years since quittin.1    Smokeless tobacco: Never Used    Tobacco comment: anxious   Substance and Sexual Activity    Alcohol use: No    Drug use: No    Sexual activity: Not Currently     Partners: Male     Review of Systems   Constitutional: Negative.   HENT: Negative.    Eyes: Negative.    Cardiovascular: Negative.    Respiratory: Positive for shortness of breath.    Endocrine: Negative.    Hematologic/Lymphatic: Negative.    Skin: Negative.    Musculoskeletal: Negative.    Gastrointestinal: Negative.    Genitourinary: Negative.    Neurological: Negative.      Objective:     Vital Signs (Most Recent):  Temp: 96.4 °F (35.8 °C) (22)  Pulse: 97 (22)  Resp: 16 (22)  BP: 126/74 (22)  SpO2: (!) 93 % (22) Vital Signs (24h Range):  Temp:  [96.4 °F (35.8 °C)-98.5 °F (36.9 °C)] 96.4 °F (35.8 °C)  Pulse:  [60-97] 97  Resp:  [16-24] 16  SpO2:  [91 %-100 %] 93 %  BP: ()/(52-85) 126/74     Weight: 102.5 kg (226 lb)  Body mass index is 38.79 kg/m².    SpO2: (!) 93 %  O2 Device (Oxygen Therapy): nasal cannula    No intake or output data in the 24 hours ending 22 0837    Lines/Drains/Airways     Drain                 Hemodialysis AV Fistula Right forearm -- days         Hemodialysis AV Fistula 18 1436  1350 days          Airway                 Airway - Non-Surgical 21 1308  Nasal Cannula 76 days          Peripheral Intravenous Line                 Peripheral IV - Single Lumen 02/04/22 1358 22 G Left Upper Arm 2 days         Peripheral IV - Single Lumen 02/06/22 1600 20 G Anterior;Left Shoulder <1 day                Physical Exam  Constitutional:       Appearance: Normal appearance.   HENT:      Head: Normocephalic.      Nose: Nose normal.   Eyes:      Extraocular Movements: Extraocular movements intact.      Pupils: Pupils are equal, round, and reactive to light.   Cardiovascular:      Rate and Rhythm: Normal rate and regular rhythm.   Pulmonary:      Effort: Pulmonary effort is normal.      Breath sounds: Normal breath sounds.   Abdominal:      General: Abdomen is flat. Bowel sounds are normal.      Palpations: Abdomen is soft.   Musculoskeletal:         General: Normal range of motion.      Cervical back: Normal range of motion.      Comments: Unable to appreciate the bilateral lower extremity pedal pulses.   Skin:     General: Skin is warm.      Capillary Refill: Capillary refill takes less than 2 seconds.   Neurological:      General: No focal deficit present.      Mental Status: She is alert and oriented to person, place, and time.         Significant Labs: All pertinent lab results from the last 24 hours have been reviewed.

## 2022-02-08 NOTE — SUBJECTIVE & OBJECTIVE
Interval History: No acute events overnight. Pt feels well and is ready for the procedure. VSS, afebrile.    Review of Systems   Constitutional: Negative for chills, diaphoresis, fever and malaise/fatigue.   HENT: Negative for congestion.    Eyes: Negative for visual disturbance.   Cardiovascular: Negative for chest pain, irregular heartbeat, leg swelling, orthopnea and palpitations.   Respiratory: Positive for shortness of breath. Negative for cough.    Gastrointestinal: Negative for abdominal pain.   Neurological: Negative for dizziness and headaches.   Psychiatric/Behavioral: Negative for altered mental status.     Objective:     Vital Signs (Most Recent):  Temp: 98.2 °F (36.8 °C) (02/08/22 1213)  Pulse: (!) 59 (02/08/22 1213)  Resp: 19 (02/08/22 1213)  BP: (!) 175/84 (02/08/22 1213)  SpO2: (!) 90 % (02/08/22 1213) Vital Signs (24h Range):  Temp:  [96.8 °F (36 °C)-98.9 °F (37.2 °C)] 98.2 °F (36.8 °C)  Pulse:  [55-69] 59  Resp:  [16-27] 19  SpO2:  [90 %-99 %] 90 %  BP: (120-175)/(60-84) 175/84     Weight: 102.5 kg (226 lb)  Body mass index is 38.79 kg/m².     SpO2: (!) 90 %  O2 Device (Oxygen Therapy): nasal cannula      Intake/Output Summary (Last 24 hours) at 2/8/2022 1453  Last data filed at 2/7/2022 1800  Gross per 24 hour   Intake 780 ml   Output 3600 ml   Net -2820 ml       Lines/Drains/Airways     Drain                 Hemodialysis AV Fistula Right forearm -- days         Hemodialysis AV Fistula 05/28/18 1436  1352 days          Airway                 Airway - Non-Surgical 11/22/21 1308 Nasal Cannula 78 days          Peripheral Intravenous Line                 Peripheral IV - Single Lumen 02/04/22 1358 22 G Left Upper Arm 4 days         Peripheral IV - Single Lumen 02/06/22 1600 20 G Anterior;Left Shoulder 1 day                Physical Exam  Constitutional:       General: She is not in acute distress.     Appearance: She is obese. She is not ill-appearing.   HENT:      Head: Normocephalic and atraumatic.    Eyes:      Extraocular Movements: Extraocular movements intact.      Pupils: Pupils are equal, round, and reactive to light.   Cardiovascular:      Rate and Rhythm: Normal rate and regular rhythm.      Pulses: Normal pulses.      Heart sounds: No murmur heard.  No friction rub. No gallop.       Comments: Right fistula  Pulmonary:      Effort: Pulmonary effort is normal.      Breath sounds: Normal breath sounds.   Abdominal:      General: Bowel sounds are normal. There is no distension.      Palpations: Abdomen is soft.   Musculoskeletal:      Right lower leg: No edema.      Left lower leg: No edema.   Skin:     General: Skin is warm and dry.   Neurological:      General: No focal deficit present.      Mental Status: She is alert and oriented to person, place, and time.   Psychiatric:         Mood and Affect: Mood normal.         Behavior: Behavior normal.         Significant Labs:   CMP   Recent Labs   Lab 02/06/22  2110 02/06/22  2325 02/07/22  0523 02/07/22  0523 02/07/22  1437 02/07/22  2358 02/08/22  0340   *  --  133*  --   --   --  136   K 5.4*   < > 5.4*  5.4*   < > 5.7* 4.8 4.9     --  100  --   --   --  98   CO2 17*  --  18*  --   --   --  24   GLU 72  --  78  --   --   --  102   BUN 60*  --  60*  --   --   --  41*   CREATININE 7.7*  --  8.0*  --   --   --  6.5*   CALCIUM 9.3  --  8.9  --   --   --  8.7   PROT  --   --  7.1  --   --   --   --    ALBUMIN  --   --  3.5  --   --   --  3.5   BILITOT  --   --  0.8  --   --   --   --    ALKPHOS  --   --  113  --   --   --   --    AST  --   --  30  --   --   --   --    ALT  --   --  32  --   --   --   --    ANIONGAP 15  --  15  --   --   --  14   ESTGFRAFRICA 5.7*  --  5.5*  --   --   --  7.0*   EGFRNONAA 5.0*  --  4.7*  --   --   --  6.1*    < > = values in this interval not displayed.   , CBC   Recent Labs   Lab 02/07/22  0523 02/07/22  0523 02/08/22  0340   WBC 5.88  --  4.20   HGB 9.4*  --  8.9*   HCT 31.8*   < > 29.4*     --  176    < >  = values in this interval not displayed.   , INR No results for input(s): INR, PROTIME in the last 48 hours., Troponin No results for input(s): TROPONINI in the last 48 hours. and All pertinent lab results from the last 24 hours have been reviewed.    Significant Imaging: Echocardiogram:   Transthoracic echo (TTE) complete (Cupid Only):   Results for orders placed or performed during the hospital encounter of 02/04/22   Echo   Result Value Ref Range    Ascending aorta 2.66 cm    STJ 2.53 cm    AV mean gradient 6 mmHg    Ao peak colten 0.71 m/s    Ao VTI 32.88 cm    IVRT 74.22 msec    IVS 0.98 0.6 - 1.1 cm    LA size 3.90 cm    Left Atrium Major Axis 6.76 cm    Left Atrium Minor Axis 6.43 cm    LVIDd 5.20 3.5 - 6.0 cm    LVIDs 3.60 2.1 - 4.0 cm    LVOT diameter 1.98 cm    LVOT peak VTI 18.97 cm    Posterior Wall 0.70 0.6 - 1.1 cm    MV Peak A Colten 0.84 m/s    E wave deceleration time 124.98 msec    MV Peak E Colten 1.07 m/s    RA Major Axis 4.83 cm    RA Width 4.22 cm    RVDD 3.94 cm    Sinus 2.92 cm    TAPSE 1.68 cm    TR Max Colten 1.68 m/s    TDI LATERAL 0.04 m/s    TDI SEPTAL 0.04 m/s    LA WIDTH 4.84 cm    MV stenosis pressure 1/2 time 36.24 ms    LV Diastolic Volume 83.47 mL    LV Systolic Volume 43.31 mL    RV S' 11.08 cm/s    LVOT peak colten 0.87 m/s    LA volume (mod) 73.33 cm3    LV LATERAL E/E' RATIO 26.75 m/s    LV SEPTAL E/E' RATIO 26.75 m/s    FS 31 %    LA volume 105.75 cm3    LV mass 154.56 g    Left Ventricle Relative Wall Thickness 0.27 cm    AV valve area 1.78 cm2    AV Velocity Ratio 1.23     AV index (prosthetic) 0.58     MV valve area p 1/2 method 6.07 cm2    E/A ratio 1.27     Mean e' 0.04 m/s    LVOT area 3.1 cm2    LVOT stroke volume 58.38 cm3    AV peak gradient 2 mmHg    E/E' ratio 26.75 m/s    LV Systolic Volume Index 21.0 mL/m2    LV Diastolic Volume Index 40.52 mL/m2    LA Volume Index 51.3 mL/m2    LV Mass Index 75 g/m2    Triscuspid Valve Regurgitation Peak Gradient 11 mmHg    LA Volume Index  (Mod) 35.6 mL/m2    BSA 2.15 m2    Right Atrial Pressure (from IVC) 8 mmHg    EF 40 %    TV rest pulmonary artery pressure 19 mmHg    Narrative    · Severe left atrial enlargement.  · The left ventricle is normal in size with mildly decreased systolic   function.  · The estimated ejection fraction is 40%.  · There are segmental left ventricular wall motion abnormalities: apex,   apical septum, mid and apical anterolateral, apical inferior and apical   anterior wall.  · Grade II left ventricular diastolic dysfunction.  · Normal right ventricular size with normal right ventricular systolic   function.  · There is mild aortic valve stenosis.  · Aortic valve area is 1.78 cm2; peak velocity is 0.71 m/s; mean gradient   is 6 mmHg.  · Intermediate central venous pressure (8 mmHg).  · The estimated PA systolic pressure is 19 mmHg.

## 2022-02-08 NOTE — PROGRESS NOTES
[facility] - Telemetry Stepdown ([place] [address])  Cardiology  Progress Note    Patient Name: Kylie King  MRN: [id number]  Admission Date: 2/4/2022  Hospital Length of Stay: 1 days  Code Status: Full Code   Attending Physician: La Shah MD   Primary Care Physician: Virginia Foote MD  Expected Discharge Date: 2/9/2022  Principal Problem:Acute on chronic respiratory failure    Subjective:     Hospital Course:   No notes on file    Interval History: No acute events overnight. Pt feels well and is ready for the procedure. VSS, afebrile.    Review of Systems   Constitutional: Negative for chills, diaphoresis, fever and malaise/fatigue.   HENT: Negative for congestion.    Eyes: Negative for visual disturbance.   Cardiovascular: Negative for chest pain, irregular heartbeat, leg swelling, orthopnea and palpitations.   Respiratory: Positive for shortness of breath. Negative for cough.    Gastrointestinal: Negative for abdominal pain.   Neurological: Negative for dizziness and headaches.   Psychiatric/Behavioral: Negative for altered mental status.     Objective:     Vital Signs (Most Recent):  Temp: 98.2 °F (36.8 °C) (02/08/22 1213)  Pulse: (!) 59 (02/08/22 1213)  Resp: 19 (02/08/22 1213)  BP: (!) 175/84 (02/08/22 1213)  SpO2: (!) 90 % (02/08/22 1213) Vital Signs (24h Range):  Temp:  [96.8 °F (36 °C)-98.9 °F (37.2 °C)] 98.2 °F (36.8 °C)  Pulse:  [55-69] 59  Resp:  [16-27] 19  SpO2:  [90 %-99 %] 90 %  BP: (120-175)/(60-84) 175/84     Weight: 102.5 kg (226 lb)  Body mass index is 38.79 kg/m².     SpO2: (!) 90 %  O2 Device (Oxygen Therapy): nasal cannula      Intake/Output Summary (Last 24 hours) at 2/8/2022 1451  Last data filed at 2/7/2022 1800  Gross per 24 hour   Intake 780 ml   Output 3600 ml   Net -2820 ml       Lines/Drains/Airways     Drain                 Hemodialysis AV Fistula Right forearm -- days         Hemodialysis AV Fistula 05/28/18 1436  1352 days          Airway                 Airway - Non-Surgical  11/22/21 1308 Nasal Cannula 78 days          Peripheral Intravenous Line                 Peripheral IV - Single Lumen 02/04/22 1358 22 G Left Upper Arm 4 days         Peripheral IV - Single Lumen 02/06/22 1600 20 G Anterior;Left Shoulder 1 day                Physical Exam  Constitutional:       General: She is not in acute distress.     Appearance: She is obese. She is not ill-appearing.   HENT:      Head: Normocephalic and atraumatic.   Eyes:      Extraocular Movements: Extraocular movements intact.      Pupils: Pupils are equal, round, and reactive to light.   Cardiovascular:      Rate and Rhythm: Normal rate and regular rhythm.      Pulses: Normal pulses.      Heart sounds: No murmur heard.  No friction rub. No gallop.       Comments: Right fistula  Pulmonary:      Effort: Pulmonary effort is normal.      Breath sounds: Normal breath sounds.   Abdominal:      General: Bowel sounds are normal. There is no distension.      Palpations: Abdomen is soft.   Musculoskeletal:      Right lower leg: No edema.      Left lower leg: No edema.   Skin:     General: Skin is warm and dry.   Neurological:      General: No focal deficit present.      Mental Status: She is alert and oriented to person, place, and time.   Psychiatric:         Mood and Affect: Mood normal.         Behavior: Behavior normal.         Significant Labs:   CMP   Recent Labs   Lab 02/06/22  2110 02/06/22  2325 02/07/22  0523 02/07/22  0523 02/07/22  1437 02/07/22  2358 02/08/22  0340   *  --  133*  --   --   --  136   K 5.4*   < > 5.4*  5.4*   < > 5.7* 4.8 4.9     --  100  --   --   --  98   CO2 17*  --  18*  --   --   --  24   GLU 72  --  78  --   --   --  102   BUN 60*  --  60*  --   --   --  41*   CREATININE 7.7*  --  8.0*  --   --   --  6.5*   CALCIUM 9.3  --  8.9  --   --   --  8.7   PROT  --   --  7.1  --   --   --   --    ALBUMIN  --   --  3.5  --   --   --  3.5   BILITOT  --   --  0.8  --   --   --   --    ALKPHOS  --   --  113  --   --    --   --    AST  --   --  30  --   --   --   --    ALT  --   --  32  --   --   --   --    ANIONGAP 15  --  15  --   --   --  14   ESTGFRAFRICA 5.7*  --  5.5*  --   --   --  7.0*   EGFRNONAA 5.0*  --  4.7*  --   --   --  6.1*    < > = values in this interval not displayed.   , CBC   Recent Labs   Lab 02/07/22  0523 02/07/22  0523 02/08/22  0340   WBC 5.88  --  4.20   HGB 9.4*  --  8.9*   HCT 31.8*   < > 29.4*     --  176    < > = values in this interval not displayed.   , INR No results for input(s): INR, PROTIME in the last 48 hours., Troponin No results for input(s): TROPONINI in the last 48 hours. and All pertinent lab results from the last 24 hours have been reviewed.    Significant Imaging: Echocardiogram:   Transthoracic echo (TTE) complete (Cupid Only):   Results for orders placed or performed during the hospital encounter of 02/04/22   Echo   Result Value Ref Range    Ascending aorta 2.66 cm    STJ 2.53 cm    AV mean gradient 6 mmHg    Ao peak colten 0.71 m/s    Ao VTI 32.88 cm    IVRT 74.22 msec    IVS 0.98 0.6 - 1.1 cm    LA size 3.90 cm    Left Atrium Major Axis 6.76 cm    Left Atrium Minor Axis 6.43 cm    LVIDd 5.20 3.5 - 6.0 cm    LVIDs 3.60 2.1 - 4.0 cm    LVOT diameter 1.98 cm    LVOT peak VTI 18.97 cm    Posterior Wall 0.70 0.6 - 1.1 cm    MV Peak A Colten 0.84 m/s    E wave deceleration time 124.98 msec    MV Peak E Colten 1.07 m/s    RA Major Axis 4.83 cm    RA Width 4.22 cm    RVDD 3.94 cm    Sinus 2.92 cm    TAPSE 1.68 cm    TR Max Colten 1.68 m/s    TDI LATERAL 0.04 m/s    TDI SEPTAL 0.04 m/s    LA WIDTH 4.84 cm    MV stenosis pressure 1/2 time 36.24 ms    LV Diastolic Volume 83.47 mL    LV Systolic Volume 43.31 mL    RV S' 11.08 cm/s    LVOT peak colten 0.87 m/s    LA volume (mod) 73.33 cm3    LV LATERAL E/E' RATIO 26.75 m/s    LV SEPTAL E/E' RATIO 26.75 m/s    FS 31 %    LA volume 105.75 cm3    LV mass 154.56 g    Left Ventricle Relative Wall Thickness 0.27 cm    AV valve area 1.78 cm2    AV Velocity  Ratio 1.23     AV index (prosthetic) 0.58     MV valve area p 1/2 method 6.07 cm2    E/A ratio 1.27     Mean e' 0.04 m/s    LVOT area 3.1 cm2    LVOT stroke volume 58.38 cm3    AV peak gradient 2 mmHg    E/E' ratio 26.75 m/s    LV Systolic Volume Index 21.0 mL/m2    LV Diastolic Volume Index 40.52 mL/m2    LA Volume Index 51.3 mL/m2    LV Mass Index 75 g/m2    Triscuspid Valve Regurgitation Peak Gradient 11 mmHg    LA Volume Index (Mod) 35.6 mL/m2    BSA 2.15 m2    Right Atrial Pressure (from IVC) 8 mmHg    EF 40 %    TV rest pulmonary artery pressure 19 mmHg    Narrative    · Severe left atrial enlargement.  · The left ventricle is normal in size with mildly decreased systolic   function.  · The estimated ejection fraction is 40%.  · There are segmental left ventricular wall motion abnormalities: apex,   apical septum, mid and apical anterolateral, apical inferior and apical   anterior wall.  · Grade II left ventricular diastolic dysfunction.  · Normal right ventricular size with normal right ventricular systolic   function.  · There is mild aortic valve stenosis.  · Aortic valve area is 1.78 cm2; peak velocity is 0.71 m/s; mean gradient   is 6 mmHg.  · Intermediate central venous pressure (8 mmHg).  · The estimated PA systolic pressure is 19 mmHg.        Assessment and Plan:         NSTEMI (non-ST elevated myocardial infarction)  Patient has elevated troponin which peaked at 7.57 (latest 6.78) and new WMA in LAD and Lcx territory with a drop in EF from 60% to 40%.  s/p PCI to LCx and LAD using V stenting with 2.5 x 8 DAINA. Patient tolerated the procedure well.        Plan:      - Recommend triple therapy for 1 month followed by Eliquis + Plavix      - Will monitor groin for complications    Atrial fibrillation  History of persistent AF, follows with EP. Given history of CVA and inability to tolerate AADs, plan for PVI followed by Watchman device placement.    Plan:  - Continue Eliquis  - Off amiodarone    Acute on  chronic diastolic heart failure  Continue Lopressor    Hyperlipidemia  Continue Lipitor        VTE Risk Mitigation (From admission, onward)         Ordered     heparin (porcine) injection 1,000 Units  As needed (PRN)         02/08/22 0723     apixaban tablet 2.5 mg  2 times daily         02/08/22 1402     heparin (porcine) injection 1,000 Units  As needed (PRN)         02/07/22 0858     IP VTE HIGH RISK PATIENT  Once         02/04/22 1508     Place sequential compression device  Until discontinued         02/04/22 1508     Reason for No Pharmacological VTE Prophylaxis  Once        Question:  Reasons:  Answer:  Already adequately anticoagulated on oral Anticoagulants    02/04/22 1508                Marco Antonio Landin MD  Cardiology  [facility] - Telemetry Stepdown ([place]

## 2022-02-08 NOTE — PLAN OF CARE
Report received from RINKU Sainz. Patient received from cath lab. Left groin with dry gauze.tegaderm dressing intact. No bleeding, no hematoma,. Post procedure EKG completed. Blood sugar 143. Vss. Bedside monitoring connected. Left leg immobilized with sheet. Frequent monitoring started.

## 2022-02-08 NOTE — PLAN OF CARE
Pt POC progressing. Tele [facility] off at dialysis. Pt refusing lokema despite elevated K, MD aware. Heparin drip restarted at 12u/kg/hr, next aptt due at 2330. Pt with no complaints at this time. Call light within reach.

## 2022-02-08 NOTE — SUBJECTIVE & OBJECTIVE
Interval History: No events overnight. Plan to intervene on Left circumflex ostial 70% lesion today     Objective:     Vital Signs (Most Recent):  Temp: 98.2 °F (36.8 °C) (02/08/22 0809)  Pulse: (!) 59 (02/08/22 0809)  Resp: 16 (02/08/22 0349)  BP: (!) 150/62 (02/08/22 0809)  SpO2: 98 % (02/08/22 0809) Vital Signs (24h Range):  Temp:  [96.6 °F (35.9 °C)-98.9 °F (37.2 °C)] 98.2 °F (36.8 °C)  Pulse:  [55-77] 59  Resp:  [16-18] 16  SpO2:  [92 %-99 %] 98 %  BP: (105-169)/(44-86) 150/62     Weight: 102.5 kg (226 lb)  Body mass index is 38.79 kg/m².    SpO2: 98 %  O2 Device (Oxygen Therapy): nasal cannula      Intake/Output Summary (Last 24 hours) at 2/8/2022 0840  Last data filed at 2/7/2022 1800  Gross per 24 hour   Intake 780 ml   Output 3600 ml   Net -2820 ml       Lines/Drains/Airways     Drain                 Hemodialysis AV Fistula Right forearm -- days         Hemodialysis AV Fistula 05/28/18 1436  1351 days          Airway                 Airway - Non-Surgical 11/22/21 1308 Nasal Cannula 77 days          Peripheral Intravenous Line                 Peripheral IV - Single Lumen 02/04/22 1358 22 G Left Upper Arm 3 days         Peripheral IV - Single Lumen 02/06/22 1600 20 G Anterior;Left Shoulder 1 day                Physical Exam  Constitutional:       Appearance: Normal appearance.   HENT:      Head: Normocephalic.      Nose: Nose normal.   Eyes:      Extraocular Movements: Extraocular movements intact.      Pupils: Pupils are equal, round, and reactive to light.   Cardiovascular:      Rate and Rhythm: Normal rate and regular rhythm.   Pulmonary:      Effort: Pulmonary effort is normal.      Breath sounds: Normal breath sounds.   Abdominal:      General: Abdomen is flat. Bowel sounds are normal.      Palpations: Abdomen is soft.   Musculoskeletal:         General: Normal range of motion.      Cervical back: Normal range of motion.      Comments: Unable to appreciate the bilateral lower extremity pedal pulses.    Skin:     General: Skin is warm.      Capillary Refill: Capillary refill takes less than 2 seconds.   Neurological:      General: No focal deficit present.      Mental Status: She is alert and oriented to person, place, and time.      Significant Labs: All pertinent lab results from the last 24 hours have been reviewed.

## 2022-02-08 NOTE — PLAN OF CARE
Problem: Adult Inpatient Plan of Care  Goal: Plan of Care Review  Outcome: Ongoing, Progressing  Goal: Patient-Specific Goal (Individualized)  Outcome: Ongoing, Progressing  Goal: Optimal Comfort and Wellbeing  Outcome: Ongoing, Progressing  Goal: Readiness for Transition of Care  Outcome: Ongoing, Progressing     Problem: Infection (Hemodialysis)  Goal: Absence of Infection Signs and Symptoms  Outcome: Ongoing, Progressing     Problem: Gas Exchange Impaired  Goal: Optimal Gas Exchange  Outcome: Ongoing, Progressing    Pt. AAOx4, currently watching television, no distress noted, no complaints of pain. VS stable. Heparin infusing to 22G left upper arm @ 15unit/kg/hour. Call bell , belongings, and walker near bedside. Bed in lowest position. Pt. Aware to call for assistance with ambulation.

## 2022-02-08 NOTE — ASSESSMENT & PLAN NOTE
History of persistent AF, follows with EP. Given history of CVA and inability to tolerate AADs, plan for PVI followed by Watchman device placement.    Plan:  - Continue Eliquis  - Off amiodarone

## 2022-02-08 NOTE — PROGRESS NOTES
[facility] - Telemetry Stepdown ([place] [address])  Interventional Cardiology  Progress Note    Patient Name: Kylie King  MRN: [id number]  Admission Date: 2/4/2022  Hospital Length of Stay: 1 days  Code Status: Full Code   Attending Physician: La Shah MD   Primary Care Physician: Virginia Foote MD  Principal Problem:Acute on chronic respiratory failure    Subjective:     Interval History: No events overnight. Plan to intervene on Left circumflex ostial 70% lesion today     Objective:     Vital Signs (Most Recent):  Temp: 98.2 °F (36.8 °C) (02/08/22 0809)  Pulse: (!) 59 (02/08/22 0809)  Resp: 16 (02/08/22 0349)  BP: (!) 150/62 (02/08/22 0809)  SpO2: 98 % (02/08/22 0809) Vital Signs (24h Range):  Temp:  [96.6 °F (35.9 °C)-98.9 °F (37.2 °C)] 98.2 °F (36.8 °C)  Pulse:  [55-77] 59  Resp:  [16-18] 16  SpO2:  [92 %-99 %] 98 %  BP: (105-169)/(44-86) 150/62     Weight: 102.5 kg (226 lb)  Body mass index is 38.79 kg/m².    SpO2: 98 %  O2 Device (Oxygen Therapy): nasal cannula      Intake/Output Summary (Last 24 hours) at 2/8/2022 0840  Last data filed at 2/7/2022 1800  Gross per 24 hour   Intake 780 ml   Output 3600 ml   Net -2820 ml       Lines/Drains/Airways     Drain                 Hemodialysis AV Fistula Right forearm -- days         Hemodialysis AV Fistula 05/28/18 1436  1351 days          Airway                 Airway - Non-Surgical 11/22/21 1308 Nasal Cannula 77 days          Peripheral Intravenous Line                 Peripheral IV - Single Lumen 02/04/22 1358 22 G Left Upper Arm 3 days         Peripheral IV - Single Lumen 02/06/22 1600 20 G Anterior;Left Shoulder 1 day                Physical Exam  Constitutional:       Appearance: Normal appearance.   HENT:      Head: Normocephalic.      Nose: Nose normal.   Eyes:      Extraocular Movements: Extraocular movements intact.      Pupils: Pupils are equal, round, and reactive to light.   Cardiovascular:      Rate and Rhythm: Normal rate and regular rhythm.    Pulmonary:      Effort: Pulmonary effort is normal.      Breath sounds: Normal breath sounds.   Abdominal:      General: Abdomen is flat. Bowel sounds are normal.      Palpations: Abdomen is soft.   Musculoskeletal:         General: Normal range of motion.      Cervical back: Normal range of motion.      Comments: Unable to appreciate the bilateral lower extremity pedal pulses.   Skin:     General: Skin is warm.      Capillary Refill: Capillary refill takes less than 2 seconds.   Neurological:      General: No focal deficit present.      Mental Status: She is alert and oriented to person, place, and time.      Significant Labs: All pertinent lab results from the last 24 hours have been reviewed.      Assessment and Plan:     Patient is a 66 y.o. female presenting with:    NSTEMI (non-ST elevated myocardial infarction)  --C +/- PCI, patient is a DAINA candidate  - Anti-platelet Therapy: ASA / plavix   - Access: Left radial  - Catheters: Bright  - Creatinine/CrCl: 1.2  - Allergies: No shellfish / Iodine allergy  - Pre-Hydration: NS  - Pre-Op Med: Bendaryl 50mg pO   - All patient's questions were answered.  -The risks, benefits and alternatives of the procedure were explained to the patient.   -The risks of coronary angiography include but are not limited to: bleeding, infection, heart rhythm abnormalities, allergic reactions, kidney injury and potential need for dialysis, stroke and death.   - Should stenting be indicated, the patient has agreed to dual anti-platelet therapy for 1-consecutive year with a drug-eluting stent and a minimum of 1-month with the use of a bare metal stent  - Additionally, pt is aware that non-compliance is likely to result in stent clotting with heart attack, heart failure, and/or death  -The risks of moderate sedation include hypotension, respiratory depression, arrhythmias, bronchospasm, and death.   - Informed consent was obtained and the  patient is agreeable to proceed with the  procedure.          VTE Risk Mitigation (From admission, onward)         Ordered     heparin (porcine) injection 1,000 Units  As needed (PRN)         02/08/22 0723     heparin (porcine) injection 1,000 Units  As needed (PRN)         02/07/22 0858     heparin 25,000 units in dextrose 5% (100 units/ml) IV bolus from bag - ADDITIONAL PRN BOLUS - 60 units/kg (max bolus 4000 units)  As needed (PRN)        Question:  Heparin Infusion Adjustment (DO NOT MODIFY ANSWER)  Answer:  \\[email or web address]\epic\Images\Pharmacy\HeparinInfusions\heparin LOW INTENSITY nomogram for [facility] [id number]    02/05/22 1918     heparin 25,000 units in dextrose 5% (100 units/ml) IV bolus from bag - ADDITIONAL PRN BOLUS - 30 units/kg (max bolus 4000 units)  As needed (PRN)        Question:  Heparin Infusion Adjustment (DO NOT MODIFY ANSWER)  Answer:  \\[email or web address]\epic\Images\Pharmacy\HeparinInfusions\heparin LOW INTENSITY nomogram for [facility] [id number].pdf    02/05/22 1918     heparin 25,000 units in dextrose 5% 250 mL (100 units/mL) infusion LOW INTENSITY nomogram - [facility]  Continuous        Question Answer Comment   Heparin Infusion Adjustment (DO NOT MODIFY ANSWER) \\[email or web address]\epic\Images\Pharmacy\HeparinInfusions\heparin LOW INTENSITY nomogram for [facility] [id number].pdf    Begin at (in units/kg/hr) 12        02/05/22 1918     IP VTE HIGH RISK PATIENT  Once         02/04/22 1508     Place sequential compression device  Until discontinued         02/04/22 1508     Reason for No Pharmacological VTE Prophylaxis  Once        Question:  Reasons:  Answer:  Already adequately anticoagulated on oral Anticoagulants    02/04/22 1508                Kvng Howe MD  Interventional Cardiology  [facility] - Telemetry Stepdown ([place]

## 2022-02-08 NOTE — NURSING TRANSFER
Nursing Transfer Note      2/8/2022     Reason patient is being transferred: back to inpatient room  Transfer to room 7079    Transfer via stretcher    Transfer with chart and tele monitor  Transported by escort  Medicines sent: none    Any special needs or follow-up needed: patietn on bedrest until 1200, to be pulled over to bed  Chart send with patient: yes  Notified: nurse    Patient reassessed at: 02/8/2022 at 1115  Upon arrival to floor:  Continued bedrest

## 2022-02-08 NOTE — ASSESSMENT & PLAN NOTE
Patient has elevated troponin which peaked at 7.57 (latest 6.78) and new WMA in LAD and Lcx territory with a drop in EF from 60% to 40%.  s/p PCI to LCx and LAD using V stenting with 2.5 x 8 DAINA. Patient tolerated the procedure well.  Will monitor groin for complications

## 2022-02-08 NOTE — ASSESSMENT & PLAN NOTE
Patient has elevated troponin which peaked at 7.57 (latest 6.78) and new WMA in LAD and Lcx territory with a drop in EF from 60% to 40%.  s/p PCI to LCx and LAD using V stenting with 2.5 x 8 DAINA. Patient tolerated the procedure well.    Plan:  - Recommend triple therapy for 1 month followed by Eliquis + Plavix  - Will monitor groin for complications

## 2022-02-08 NOTE — BRIEF OP NOTE
Brief Operative Report:    : Abelardo Betancur MD     All Operators: Surgeon(s):  MD Roger Ashley MD Marloe Prince, MD     Preoperative Diagnosis: NSTEMI (non-ST elevated myocardial infarction) [I21.4]     Postop Diagnosis: NSTEMI (non-ST elevated myocardial infarction) [I21.4]    Treatments/Procedures: Procedure(s) (LRB):  ANGIOGRAM, CORONARY ARTERY (N/A)  Stent, Drug Eluting, Multi Vessel, Coronary    Findings:Severe coronary disease is present. See catheterization report for full details. S/p PCI to LCx and LAD using V stenting with 2.5 x 8 DAINA    Estimated Blood loss: 20 cc    Specimens removed: Dora Nye

## 2022-02-08 NOTE — PT/OT/SLP PROGRESS
Occupational Therapy      Patient Name:  Kylie King   MRN:  [id number]    Patient not seen today secondary to patient declined therapy and stated she just wanted to go home today. Will follow-up for therapy if patient does not discharge.    2/8/2022

## 2022-02-08 NOTE — PROGRESS NOTES
Progress Note  Hospital Medicine    Primary Team: [facility] Medicine Team V  Admit Date: 2/4/2022   Length of Stay:  LOS: 1 day   SUBJECTIVE:   Reason for Admission:  Acute on chronic respiratory failure    HPI:  Kylie King is a 66 year old female with chronic respiratory failure (on home 2L of O2), COPD, ESRD on HD TTS w/ anemia of chronic disease, Atrial Fib (failed DCCV in Nov 2021); on eliquis and amiodarone, CVA, PVD (s/p left foot amputation on Plavix), HTN, T2DM, HLD, who presented to the emergency department for dyspnea. Patient reports she has been out of her oxygen for the past few days. She reports dyspnea started a few days ago but worsened overnight. She last had HD on Tuesday and missed her Thursday session due to feeling unwell. She denies chest pain, LE edema. She is anuric. She reports she is scheduled to undergo an RFA for her atrial fibrillation on Monday morning and was told to hold her amiodarone, although she isn't clear as to whether she has been taking the medication. She also reports taking eliquis twice daily. Of note, the patient has had multiple hospital visits (4 in the past six months). She has a history of falls for which eliquis was held, however she recently was seen by EP- Dr. Kuo who recommended she continue her eliquis  but hold it the night prior to RF-PVI and possible VIRAJ closure device.     Upon arrival to the ED, she was hemodynamically stable, placed on 2L of O2. Labs revealed K of 6.3, BNP 2004, Troponin 3.14. CXR revealed pulmonary edema and possible left sided opacification. ECG revealed non specific ST-T wave changes. Cardiology was consulted. Patient was shifted and received calcium for cardioprotection. Patient was admitted into Hospital medicine for further management.          Overview/Hospital Course:  Patient was admitted for hypervolemia in the setting of missed dialysis session and hyperkalemia of 6.3. Cardiology was consulted for elevated troponin (up  to 3) on admit day and deemed this secondary to NSTEMI in the setting of missed HD. ECG revealed non specific ST-T wave changes. She susbequently underwent HD with removal of 3.1L. Troponin levels continued to uptrend, peaked at 7.4 and trended down to 6.78. Cardiology was re-consulted and felt it wasn't due to acute coronary syndrome at that time. Repeat ECG didn't reveal ischemic changes. TTE revealed abnormal wall motion abnormalities in the LAD and Lcx territory with a drop in EF from 60% to 40%. Initiated on ACS protocol; underwent diagnostic LHC 2/7, with intervention 2/8.     Interval History: Pt taken for LHC this morning; s/p PCI to LCx and LAD using V stenting.  She feels well, only mild soreness at left groin site.    Review of Systems:  Constitutional: no fever or chills  Respiratory: no cough or shortness of breath  Cardiovascular: no chest pain or palpitations  Gastrointestinal: no nausea or vomiting, no abdominal pain or change in bowel habits  Musculoskeletal: no arthralgias or myalgias     OBJECTIVE:     Temp:  [96.8 °F (36 °C)-98.9 °F (37.2 °C)]   Pulse:  [55-69]   Resp:  [16-27]   BP: (120-175)/(60-84)   SpO2:  [90 %-99 %]  Body mass index is 38.79 kg/m².  Intake/[place]ake:  This Shift:  No intake/output data recorded.    Net I/O past 24h:     Intake/Output Summary (Last 24 hours) at 2/8/2022 1357  Last data filed at 2/7/2022 1800  Gross per 24 hour   Intake 780 ml   Output 3600 ml   Net -2820 ml             Physical Exam:  Gen- well-developed, well-nourished, NAD  CVS- S1 and S2 present, RRR, no murmurs  Resp- CTA b/l, no work of breathing  Abd- BS+, soft, NT, ND. Dressing at left groin  Ext- no clubbing, cyanosis.  Left forefoot amputated.    Laboratory:  CBC/Anemia Labs: Coags:    Recent Labs   Lab 02/06/22  0432 02/07/22  0523 02/08/22  0340   WBC 4.75  4.75 5.88 4.20   HGB 9.3*  9.3* 9.4* 8.9*   HCT 31.9*  31.9* 31.8* 29.4*     185 213 176   MCV 96  96 93 91   RDW 19.3*  19.3* 18.9*  18.7*    Recent Labs   Lab 02/02/22  1100 02/02/22  1100 02/05/22  1959 02/06/22 0432 02/07/22 2358 02/08/22  0340 02/08/22  0617   INR 1.1  --  1.1  --   --   --   --    APTT 26.2   < > 29.4   < > 26.2 36.2* 28.7    < > = values in this interval not displayed.        Chemistries:   Recent Labs   Lab 02/05/22  0606 02/05/22  0606 02/06/22  0432 02/06/22  1157 02/06/22  2110 02/06/22  2325 02/07/22  0523 02/07/22  0523 02/07/22  1437 02/07/22 2358 02/08/22  0340   *   < > 137   < > 134*  --  133*  --   --   --  136   K 4.9   < > 5.6*   < > 5.4*   < > 5.4*  5.4*   < > 5.7* 4.8 4.9      < > 105   < > 102  --  100  --   --   --  98   CO2 23   < > 20*   < > 17*  --  18*  --   --   --  24   BUN 30*   < > 47*   < > 60*  --  60*  --   --   --  41*   CREATININE 5.3*   < > 6.7*   < > 7.7*  --  8.0*  --   --   --  6.5*   CALCIUM 9.3   < > 9.4   < > 9.3  --  8.9  --   --   --  8.7   PROT 6.6  --  7.2  --   --   --  7.1  --   --   --   --    BILITOT 0.6  --  0.7  --   --   --  0.8  --   --   --   --    ALKPHOS 103  --  118  --   --   --  113  --   --   --   --    ALT 18  --  27  --   --   --  32  --   --   --   --    AST 24  --  30  --   --   --  30  --   --   --   --    MG 2.1  --  2.3  --   --   --  2.1  --   --   --   --    PHOS 5.6*   < > 6.7*  --   --   --  7.6*  --   --   --  6.7*    < > = values in this interval not displayed.        Medications:  Scheduled Meds:   sodium chloride 0.9%   Intravenous Once    sodium chloride 0.9%   Intravenous Once    [START ON 2/9/2022] sodium chloride 0.9%   Intravenous Once    aspirin  81 mg Oral Daily    atorvastatin  40 mg Oral Daily    citalopram  20 mg Oral Nightly    clopidogreL  75 mg Oral Daily    metoprolol tartrate  25 mg Oral BID    sevelamer carbonate  1,600 mg Oral TID WM                             Continuous Infusions:    PRN Meds:.acetaminophen, dextrose 10%, dextrose 10%, glucagon (human recombinant), glucose, glucose, heparin (porcine), [START ON  2/9/2022] heparin (porcine), insulin aspart U-100, naloxone, ondansetron, sodium chloride 0.9%, sodium chloride 0.9%, sodium chloride 0.9%     ASSESSMENT/PLAN:     Active Hospital Problems    Diagnosis  POA    *Acute on chronic respiratory failure [J96.20]  Yes    NSTEMI (non-ST elevated myocardial infarction) [I21.4]  Unknown    Atrial fibrillation [I48.91]  Yes    Anemia in ESRD (end-stage renal disease) [N18.6, D63.1]  Yes    Hyperkalemia [E87.5]  Yes    Mild major depressive disorder [F32.9]  Yes    End-stage renal disease on hemodialysis [N18.6, Z99.2]  Not Applicable     Chronic    Acute on chronic diastolic heart failure [I50.33]  Yes    Type II diabetes mellitus [E11.9]  Yes     Chronic    Hypertension [I10]  Yes    Hyperlipidemia [E78.5]  Yes      Resolved Hospital Problems   No resolved problems to display.     Acute on chronic respiratory failure  In the setting of hypervolemia due to missed HD session vs decompensated HF.   On home 2L of O2.     - HD for volume removal   - Wean O2 to maintain sats 88-92% given hx of COPD  - She reports she is out of oxygen at home; will d/w SW to see if  6MWT needed before discharge        NSTEMI (non-ST elevated myocardial infarction)  ECG with non-specific ST-T changes, troponin elevated at 3. BNP elevated in 2000s. Initially thought to be type 2 NSTEMI in the setting of volume overload due to missed HD session.   - Troponin peaked at 7.4, trended down to 6.8  - TTE revealed abnormal wall motion abnormalities in the LAD and Lcx territory with a drop in EF from 60% to 40% concerning for ACS event  - [facility] 2/7 showed left circumflex ostial 70% lesion  - DAINA (V stent) placed to LCx and LAD 2/8  - Plan for DAPT x 1 month, then Plavix alone  - Continue Metoprolol, statin       Atrial fibrillation  Patient with Long standing persistent (>12 months) atrial fibrillation which is controlled currently with Beta Blocker and Amiodarone. Patient is currently in atrial  fibrillation.[id number] Score: 7. Anticoagulation indicated, on Eliquis at home     Plan  - Patient follows with EP- Dr. Kuo has rescheduled RF PVI with possible VIRAJ closure.  Currently in NSR, but recommends NOAH/CV and await recovery of EF prior to pursuing PVI   - Resume Eliquis 2.5mg BID per prior dosing   - Amiodarone had been held for PVI; will resume on discharge since procedure delayed  Continue Metoprolol  - Maintain K>4, Mg>2        Anemia in ESRD (end-stage renal disease)  Baseline Hgb 9-10. Secondary to anemia of chronic disease.  Hgb 8.4 on admission     - Will discuss EPO administration with nephrology as Hgb <10  - Iron studies  - Daily CBC     Hyperkalemia  Potassium 6.3 on admission in the setting of missed HD session  ECG without hyperkalemic changes. Received IV calcium  Pt reports no trouble with hyperkalemia as outpatient  Understands risks of hyperkalemia but does not like Lokelma     Mild major depressive disorder  Continue home citalopram     End-stage renal disease on hemodialysis  On home HD TTh S. Missed HD the day prior to hospital admission.   S/p HD 02/04 (on admit day) with removal of 3L     - Nephrology following. Patient refused HD 02/05, tolerated 3h 2/7  - Daily RFP  - Avoid nephrotoxins; renally dose meds  - Renal diet.        Acute on chronic diastolic heart failure  BNP elevated at 2000s. CXR with pulmonary edema.   Tolerated HD on admit day with removal of 3L.  Likely now with ischemic cardiomyopathy     TTE 02/05/2022  · Severe left atrial enlargement.  · The left ventricle is normal in size with mildly decreased systolic function.  · The estimated ejection fraction is 40%.  · There are segmental left ventricular wall motion abnormalities: apex, apical septum, mid and apical anterolateral, apical inferior and apical anterior wall.  · Grade II left ventricular diastolic dysfunction.  · Normal right ventricular size with normal right ventricular systolic function.  · There is  mild aortic valve stenosis.  · Aortic valve area is 1.78 cm2; peak velocity is 0.71 m/s; mean gradient is 6 mmHg.  · Intermediate central venous pressure (8 mmHg).  · The estimated PA systolic pressure is 19 mmHg.     Plan   - Fluid removal with HD  - Low salt diet   - Fluid restriction of 1500ml daily  - Continue GDMT and titrate as tolerated.         Type II diabetes mellitus  Controlled A1c 5.9.      - POCT glucose ACHS  - Maintain -180        Hyperlipidemia  Continue home statin        Hypertension  On home amlodipine 10mg, coreg 12.5mg BID, hydralazine 50mg TID    - Initiated metoprolol 25mg tartrate BID and losartan 25mg daily (held with Hyperkalemia). Titrate GDMT as tolerated  - HD as scheduled by nephrology    DVT ppx- Eliquis  CODE Status- FULL    Dispo- home tomorrow pending medical stability; will need f/u with General Cardiology in 2 weeks    La Shah MD  Hospital Medicine Staff

## 2022-02-08 NOTE — NURSING
APTT (results: 36.2) drawn approximately >2 hours early. Results may be inaccurate due to lab drawn while heparin infusing. Lab redrawn approx. @ 0615, heparin was stopped at that moment. Results pending, will titrate heparin with new aPTT results. Charge nurse, Sulema, notified.

## 2022-02-08 NOTE — NURSING
Home Oxygen Evaluation    Date Performed: 2022    1) Patient's Home O2 Sat on room air, while at rest: 94%        If O2 sats on room air at rest are 88% or below, patient qualifies. No additional testing needed. Document N/A in steps 2 and 3. If 89% or above, complete steps 2.      2) Patient's O2 Sat on room air while exercisin%        If O2 sats on room air while exercising remain 89% or above patient does not qualify, no further testing needed Document N/A in step 3. If O2 sats on room air while exercising are 88% or below, continue to step 3.      3) Patient's O2 Sat while exercising on O2:   n/a         (Must show improvement from #2 for patients to qualify)    If O2 sats improve on oxygen, patient qualifies for portable oxygen. If not, the patient does not qualify.

## 2022-02-09 NOTE — DISCHARGE SUMMARY
DISCHARGE SUMMARY  Hospital Medicine    Team: [facility] [place], [place] CARDIOLOGY TEAM C - FELLOWS/CONSULTS    Patient Name: Kylie King  YOB: 1955    Admit Date: 2/4/2022    Discharge Date: 02/08/2022    Discharge Attending Physician: La Shah MD    Principal Diagnoses:  Active Hospital Problems    Diagnosis  POA    *Acute on chronic respiratory failure [J96.20]  Yes    NSTEMI (non-ST elevated myocardial infarction) [I21.4]  Unknown    Atrial fibrillation [I48.91]  Yes    Anemia in ESRD (end-stage renal disease) [N18.6, D63.1]  Yes    Hyperkalemia [E87.5]  Yes    Mild major depressive disorder [F32.9]  Yes    End-stage renal disease on hemodialysis [N18.6, Z99.2]  Not Applicable     Chronic    Acute on chronic diastolic heart failure [I50.33]  Yes    Type II diabetes mellitus [E11.9]  Yes     Chronic    Hypertension [I10]  Yes    Hyperlipidemia [E78.5]  Yes      Resolved Hospital Problems   No resolved problems to display.       Discharged Condition: stable    HOSPITAL COURSE:      Initial Presentation:    Kylie King is a 66 year old female with chronic respiratory failure (on home 2L of O2), COPD, ESRD on HD TTS w/ anemia of chronic disease, Atrial Fib (failed DCCV in Nov 2021); on eliquis and amiodarone, CVA, PVD (s/p left foot amputation on Plavix), HTN, T2DM, HLD, who presented to the emergency department for dyspnea. Patient reports she has been out of her oxygen for the past few days. She reports dyspnea started a few days ago but worsened overnight. She last had HD on Tuesday and missed her Thursday session due to feeling unwell. She denies chest pain, LE edema. She is anuric. She reports she is scheduled to undergo an RFA for her atrial fibrillation on Monday morning and was told to hold her amiodarone, although she isn't clear as to whether she has been taking the medication. She also reports taking eliquis twice daily. Of note, the patient has had multiple hospital visits (4 in the  past six months). She has a history of falls for which eliquis was held, however she recently was seen by EP- Dr. Kuo who recommended she continue her eliquis  but hold it the night prior to RF-PVI and possible VIRAJ closure device.     Upon arrival to the ED, she was hemodynamically stable, placed on 2L of O2. Labs revealed K of 6.3, BNP 2004, Troponin 3.14. CXR revealed pulmonary edema and possible left sided opacification. ECG revealed non specific ST-T wave changes. Cardiology was consulted. Patient was shifted and received calcium for cardioprotection. Patient was admitted into Hospital medicine for further management.      Course of Principle Problem for Admission:    Patient was admitted for hypervolemia in the setting of missed dialysis session and hyperkalemia of 6.3. Cardiology was consulted for elevated troponin (up to 3) on admit day and deemed this secondary to NSTEMI in the setting of missed HD. ECG revealed non specific ST-T wave changes. She susbequently underwent HD with removal of 3.1L. Troponin levels continued to uptrend, peaked at 7.4 and trended down to 6.78. Cardiology was re-consulted and felt it wasn't due to acute coronary syndrome at that time. Repeat ECG didn't reveal ischemic changes. TTE revealed abnormal wall motion abnormalities in the LAD and Lcx territory with a drop in EF from 60% to 40%. Initiated on ACS protocol; underwent diagnostic LHC 2/7, with intervention 2/8 (PCI to LCx and LAD using V stenting).  She had no further chest pain or SOB for duration of stay.       Other Medical Problems Addressed in the Hospital:    Acute on chronic respiratory failure  In the setting of hypervolemia due to missed HD session vs decompensated HF.   On home 2L of O2.     - HD for volume removal   - Wean O2 to maintain sats 88-92% given hx of COPD  - She reports she is out of oxygen at home; 6MWT was done and oxygen will be delivered to her house        NSTEMI (non-ST elevated myocardial  infarction)  ECG with non-specific ST-T changes, troponin elevated at 3. BNP elevated in 2000s. Initially thought to be type 2 NSTEMI in the setting of volume overload due to missed HD session.   - Troponin peaked at 7.4, trended down to 6.8  - TTE revealed abnormal wall motion abnormalities in the LAD and Lcx territory with a drop in EF from 60% to 40% concerning for ACS event  - [facility] 2/7 showed left circumflex ostial 70% lesion  - DAINA (V stent) placed to LCx and LAD 2/8  - Plan for DAPT x 1 month, then Plavix alone (given pt is also on Eliquis)  - Continue Metoprolol, statin        Atrial fibrillation  Patient with Long standing persistent (>12 months) atrial fibrillation which is controlled currently with Beta Blocker and Amiodarone. Patient is currently in atrial fibrillation.[id number] Score: 7. Anticoagulation indicated, on Eliquis at home     Plan  - Patient follows with EP- Dr. Kuo has rescheduled RF PVI with possible VIRAJ closure.  Currently in NSR, but recommends NOAH/CV and await recovery of EF prior to pursuing PVI   - Resumed Eliquis 2.5mg BID per prior dosing (high risk given prior bleeding, now on triple therapy)  - Amiodarone had been held for PVI; will resume on discharge since procedure delayed  -Continue Metoprolol  - Maintain K>4, Mg>2        Anemia in ESRD (end-stage renal disease)  Baseline Hgb 9-10. Secondary to anemia of chronic disease.  Hgb 8.4 on admission     - Will discuss EPO administration with nephrology as Hgb <10  - Iron studies  - Daily CBC     Hyperkalemia  Potassium 6.3 on admission in the setting of missed HD session  ECG without hyperkalemic changes. Received IV calcium  Pt reports no trouble with hyperkalemia as outpatient  Understands risks of hyperkalemia but does not like Lokelma  -Defer starting Losartan given risk of hyperkalemia in this patient     Mild major depressive disorder  Continue home citalopram     End-stage renal disease on hemodialysis  On home HD TTh S.  Missed HD the day prior to hospital admission.   S/p HD 02/04 (on admit day) with removal of 3L     - Nephrology following. Patient refused HD 02/05, tolerated 3h 2/7  -I recommended patient stay until 2/9 to receive HD, then resume home schedule 2/10, but she deferred understanding risks  - Daily RFP  - Avoid nephrotoxins; renally dose meds  - Renal diet.        Acute on chronic diastolic heart failure  BNP elevated at 2000s. CXR with pulmonary edema.   Tolerated HD on admit day with removal of 3L.  Likely now with ischemic cardiomyopathy     TTE 02/05/2022  · Severe left atrial enlargement.  · The left ventricle is normal in size with mildly decreased systolic function.  · The estimated ejection fraction is 40%.  · There are segmental left ventricular wall motion abnormalities: apex, apical septum, mid and apical anterolateral, apical inferior and apical anterior wall.  · Grade II left ventricular diastolic dysfunction.  · Normal right ventricular size with normal right ventricular systolic function.  · There is mild aortic valve stenosis.  · Aortic valve area is 1.78 cm2; peak velocity is 0.71 m/s; mean gradient is 6 mmHg.  · Intermediate central venous pressure (8 mmHg).  · The estimated PA systolic pressure is 19 mmHg.     Plan   - Fluid removal with HD  - Low salt diet   - Fluid restriction of 1500ml daily  - Continue GDMT and titrate as tolerated; as above, did not prescribe ARB due to hyperkalemia        Type II diabetes mellitus  Controlled A1c 5.9.      - POCT glucose ACHS  - Maintain -180  - Resume home basal and prandial insulin on discharge      Hyperlipidemia  Continue home statin        Hypertension  On home amlodipine 10mg, coreg 12.5mg BID, hydralazine 50mg TID    - Initiated metoprolol 25mg tartrate BID and losartan 25mg daily (held with Hyperkalemia). Titrate GDMT as tolerated  - HD as scheduled by nephrology      Consults: Cardiology and Nephrology    Last CBC/BMP:    CBC/Anemia Labs:  Coags:    Recent Labs   Lab 02/06/22 0432 02/07/22  0523 02/08/22  0340   WBC 4.75  4.75 5.88 4.20   HGB 9.3*  9.3* 9.4* 8.9*   HCT 31.9*  31.9* 31.8* 29.4*     185 213 176   MCV 96  96 93 91   RDW 19.3*  19.3* 18.9* 18.7*    Recent Labs   Lab 02/02/22  1100 02/02/22  1100 02/05/22  1959 02/06/22 0432 02/07/22 2358 02/08/22  0340 02/08/22  0617   INR 1.1  --  1.1  --   --   --   --    APTT 26.2   < > 29.4   < > 26.2 36.2* 28.7    < > = values in this interval not displayed.        Chemistries:   Recent Labs   Lab 02/05/22  0606 02/05/22  0606 02/06/22 0432 02/06/22  1157 02/06/22 2110 02/06/22 2325 02/07/22 0523 02/07/22 0523 02/07/22  1437 02/07/22 2358 02/08/22  0340   *   < > 137   < > 134*  --  133*  --   --   --  136   K 4.9   < > 5.6*   < > 5.4*   < > 5.4*  5.4*   < > 5.7* 4.8 4.9      < > 105   < > 102  --  100  --   --   --  98   CO2 23   < > 20*   < > 17*  --  18*  --   --   --  24   BUN 30*   < > 47*   < > 60*  --  60*  --   --   --  41*   CREATININE 5.3*   < > 6.7*   < > 7.7*  --  8.0*  --   --   --  6.5*   CALCIUM 9.3   < > 9.4   < > 9.3  --  8.9  --   --   --  8.7   PROT 6.6  --  7.2  --   --   --  7.1  --   --   --   --    BILITOT 0.6  --  0.7  --   --   --  0.8  --   --   --   --    ALKPHOS 103  --  118  --   --   --  113  --   --   --   --    ALT 18  --  27  --   --   --  32  --   --   --   --    AST 24  --  30  --   --   --  30  --   --   --   --    MG 2.1  --  2.3  --   --   --  2.1  --   --   --   --    PHOS 5.6*   < > 6.7*  --   --   --  7.6*  --   --   --  6.7*    < > = values in this interval not displayed.            Significant Diagnostic Studies: as above    Special Treatments/Procedures:   Procedure(s) (LRB):  ANGIOGRAM, CORONARY ARTERY (N/A)  Stent, Drug Eluting, Multi Vessel, Coronary     Disposition: Home or Self Care      Future Scheduled Appointments:  Future Appointments   Date Time Provider Department Center   2/15/2022  3:00 PM Min Whitehead MD  [facility] CARDIO [address]   3/15/2022  2:00 PM Virginia Foote MD [facility] IM [place]       Discharge Medication List:     Medication List      START taking these medications    apixaban 2.5 mg Tab  Commonly known as: ELIQUIS  Take 1 tablet (2.5 mg total) by mouth 2 (two) times daily.     aspirin 81 MG EC tablet  Commonly known as: ECOTRIN  Take 1 tablet (81 mg total) by mouth once daily. Take until 3/8/22, then stop unless further instructed by provider.  Start taking on: February 9, 2022     metoprolol tartrate 25 MG tablet  Commonly known as: LOPRESSOR  Take 1 tablet (25 mg total) by mouth 2 (two) times daily.     nitroGLYCERIN 0.3 MG SL tablet  Commonly known as: NITROSTAT  Place 1 tablet (0.3 mg total) under the tongue every 5 (five) minutes as needed for Chest pain.        CHANGE how you take these medications    atorvastatin 40 MG tablet  Commonly known as: LIPITOR  Take 1 tablet (40 mg total) by mouth once daily.  What changed: Another medication with the same name was removed. Continue taking this medication, and follow the directions you see here.        CONTINUE taking these medications    acetaminophen-codeine 300-30mg 300-30 mg Tab  Commonly known as: TYLENOL #3  Take 1 tablet by mouth every 8 (eight) hours as needed.     albuterol 90 mcg/actuation inhaler  Commonly known as: PROVENTIL/VENTOLIN HFA  Inhale 2 puffs into the lungs every 6 (six) hours as needed for Wheezing. Rescue     amiodarone 200 MG Tab  Commonly known as: PACERONE  Take 1 tablet (200 mg total) by mouth once daily.     benzonatate 100 MG capsule  Commonly known as: TESSALON  Take 1 capsule (100 mg total) by mouth 3 (three) times daily as needed for Cough.     blood sugar diagnostic Strp  1 strip by Misc.(Non-Drug; Combo Route) route 4 (four) times daily.     citalopram 20 MG tablet  Commonly known as: CeleXA  Take 1 tablet (20 mg total) by mouth nightly.     clopidogreL 75 mg tablet  Commonly known as: PLAVIX  Take 1 tablet (75 mg total) by  mouth once daily.     DEXCOM G6 SENSOR Umm  Generic drug: blood-glucose sensor  1 each by Misc.(Non-Drug; Combo Route) route every 10 days.     DEXCOM G6 TRANSMITTER Umm  Generic drug: blood-glucose transmitter  1 Device by Misc.(Non-Drug; Combo Route) route continuous.     famotidine 20 MG tablet  Commonly known as: PEPCID  Take 1 tablet (20 mg total) by mouth nightly as needed.     fish oil-omega-3 fatty acids 300-1,000 mg capsule     * FREESTYLE LULU 14 DAY READER Misc  Generic drug: flash glucose scanning reader  Use as directed to check blood sugars     * FREESTYLE LULU 2 READER [facility]  Generic drug: flash glucose scanning reader  1 Units by Misc.(Non-Drug; Combo Route) route continuous prn.     * FREESTYLE LULU 14 DAY SENSOR Kit  Generic drug: flash glucose sensor  Use as directed to check blood sugars     * FREESTYLE LULU 2 SENSOR Kit  Generic drug: flash glucose sensor  1 Units by Misc.(Non-Drug; Combo Route) route every 14 (fourteen) days.     gabapentin 300 MG capsule  Commonly known as: NEURONTIN  Take 1 capsule (300 mg total) by mouth every evening.     guaiFENesin 600 mg 12 hr tablet  Commonly known as: MUCINEX  Take 1 tablet (600 mg total) by mouth 2 (two) times daily.     insulin aspart U-100 100 unit/mL (3 mL) Inpn pen  Commonly known as: NovoLOG  Inject 3 Units into the skin 3 (three) times daily with meals.     insulin detemir U-100 100 unit/mL (3 mL) Inpn pen  Commonly known as: Levemir FLEXTOUCH  Inject 4 Units into the skin 2 (two) times daily.     lancets 33 gauge Misc  Commonly known as: ONETOUCH DELICA LANCETS  1 lancet by Misc.(Non-Drug; Combo Route) route 4 (four) times daily before meals and nightly.     letrozole 2.5 mg Tab  Commonly known as: FEMARA  Take 1 tablet by mouth daily   methocarbamoL 500 MG Tab  Commonly known as: ROBAXIN     senna-docusate 8.6-50 mg 8.6-50 mg per tablet  Commonly known as: PERICOLACE  Take 1 tablet by mouth 2 (two) times daily.     sevelamer carbonate 800  "mg Tab  Commonly known as: RENVELA  Take 2 tablets (1,600 mg total) by mouth 3 (three) times daily with meals.         * This list has 4 medication(s) that are the same as other medications prescribed for you. Read the directions carefully, and ask your doctor or other care provider to review them with you.            STOP taking these medications    amLODIPine 10 MG tablet  Commonly known as: NORVASC     carvediloL 12.5 MG tablet  Commonly known as: COREG     cloNIDine 0.1 MG tablet  Commonly known as: CATAPRES     hydrALAZINE 50 MG tablet  Commonly known as: APRESOLINE                Where to Get Your Medications      These medications were sent to [id number] - [place] - [address]  [address] [place] [address]    Phone: [phone]   · apixaban 2.5 mg Tab  · aspirin 81 MG EC tablet  · metoprolol tartrate 25 MG tablet  · nitroGLYCERIN 0.3 MG SL tablet     You can get these medications from any pharmacy    Bring a paper prescription for each of these medications  · clopidogreL 75 mg tablet         Patient Instructions:  Discharge Procedure Orders   OXYGEN FOR HOME USE     Order Specific Question Answer Comments   Liter Flow 2    Duration With activity    Qualifying Test Performed at: Activity    Oxygen saturation at rest 94    Oxygen saturation with activity 88    Oxygen saturation with activity on oxygen 95    Portable mode: continuous    Route nasal cannula    Device: home concentrator with portable tanks    Length of need (in months): 99 mos    Patient condition with qualifying saturation COPD    Height: 5' 4" (1.626 m)    Weight: 102.5 kg (226 lb)    Alternative treatment measures have been tried or considered and deemed clinically ineffective. Yes      Ambulatory referral/consult to [facility]   Standing Status: Future   Referral Priority: Routine Referral Type: Consultation   Referral Reason: Specialty Services Required   Number of Visits " Requested: 1     Ambulatory referral/consult to [facility]   Standing Status: Future   Referral Priority: Routine Referral Type: Consultation   Referral Location: [facility]   Requested Specialty: [facility]   Number of Visits Requested: 1     Notify your health care provider if you experience any of the following:  severe uncontrolled pain     Notify your health care provider if you experience any of the following:  difficulty breathing or increased cough     Activity as tolerated       At the time of discharge patient was told to take all medications as prescribed, to keep all followup appointments, and to call their primary care physician or return to the emergency room if they have any worsening or concerning symptoms.    Signing Physician:  La Shah MD

## 2022-02-09 NOTE — PROGRESS NOTES
C3 nurse spoke with Kylie King   for a TCC post hospital discharge follow up call. The patient does not have a scheduled HOSFU appointment with Virginia Foote MD within 7 days post hospital discharge date 2/8/2022. C3 nurse was unable to schedule HOSFU appointment in [facility]. Pt declined NP visit.     Message sent to PCP staff requesting they contact patient and schedule follow up appointment.

## 2022-02-09 NOTE — PLAN OF CARE
[facility] - Telemetry Stepdown ([facility])  Discharge Final Note    Primary Care Provider: Virginia Foote MD    Expected Discharge Date: 2/8/2022    Patient discharged to home via personal transportation.     Patient's bedside nurse and patient notified of the above.      Final Discharge Note (most recent)       Final Note - 02/09/22 0912          Final Note    Assessment Type Final Discharge Note (P)      Anticipated Discharge Disposition Home or Self Care (P)         Post-Acute Status    Post-Acute Authorization Other (P)      Other Status No Post-Acute Service Needs (P)                      Important Message from Medicare  Important Message from Medicare regarding Discharge Appeal Rights: Given to patient/caregiver,Explained to patient/caregiver,Signed/date by patient/caregiver     Date IMM was signed: 02/08/22  Time IMM was signed: 1507    Contact Info       [facility]    [address] [place] [address]   Phone: [phone]       Next Steps: Follow up            Future Appointments   Date Time Provider Department Center   2/15/2022  3:00 PM Min Whitehead MD [facility] CARDIO [address]   3/15/2022  2:00 PM Virginia Foote MD [facility] [place] scheduled post-discharge follow-up appointment and information added to AVS.     Teressa Vegas LMSW  [facility]  Ext. [phone]

## 2022-02-09 NOTE — PROGRESS NOTES
C3 nurse attempted to contact Kylie King   for a TCC post hospital discharge follow up call. The patient is unable to conduct the call @ this time. The patient requested a callback.    The patient does not have a scheduled HOSFU appointment within 7-14 days post hospital discharge date 2/8/2022. Message sent to Physician staff to assist with HOSFU appointment scheduling.

## 2022-02-09 NOTE — PATIENT INSTRUCTIONS
Patient Education       Respiratory Distress Syndrome Discharge Instructions, Adult   About this topic   ARDS stands for acute respiratory distress syndrome. It is a very serious lung problem that may lead to death. This illness keeps you from getting enough oxygen into your blood because your lungs are filled with fluid. Then, you are not able to get enough oxygen to other parts of your body. People may get ARDS if they are very sick. They may have other serious illnesses or have very bad injuries. ARDS must be treated right away. This may help stop more damage to the body.     What care is needed at home?   · Ask your doctor what you need to do when you go home. Make sure you ask questions if you do not understand what the doctor says. This way you will know what you need to do.  · You may need to have oxygen therapy at home. You may have been shown breathing exercise while in the hospital. Keep doing them when you get home.  · Make your family and friends aware of your condition. Let them know how they can help you.  · Do not smoke and do not drink beer, wine, and mixed drinks (alcohol).  · Keep doing your breathing exercises.  · Do not go outside in very cold or very hot weather. Do not go outside when the air quality is bad.  What follow-up care is needed?   Your doctor may ask you to make visits to the office to check on your progress. Be sure to keep these visits. You may need to go to a lung rehab center for more care. This may help you get your strength back. Talk with your doctor about any concerns or worries you may have.  What drugs may be needed?   The doctor may order drugs to:  · Fight an infection  · Help with swelling  · Get rid of extra fluid from the lungs  · Prevent blood clots  Will physical activity be limited?   You may have to limit your activity. Talk to your doctor about the right amount of activity for you. Doing exercises to help your lungs get stronger will be important.  What problems  could happen?   · Short-term or long-term lung damage  · Infection  · Weakening of other organs  · Problems with brain functions like decreased memory or concentration  · Muscle wasting and weakness  · Collapsed lung. This is a pneumothorax.  · Emotional problems, such as depression, anxiety, and post-traumatic stress disorder  What can be done to prevent this health problem?   ARDS most often happens to people who are already in the hospital for illness or trauma. Keep your lungs healthy so if it happens, you have a better chance of recovery. Here are some things you may do to prevent illnesses.  · Wash your hands often with soap and water for at least 20 seconds, especially after coughing or sneezing. Alcohol-based hand sanitizers also work to kill the virus.  · If you are sick, cover your mouth and nose with tissue when you cough or sneeze. You can also cough into your elbow. Throw away tissues in the trash and wash your hands after touching used tissues.  · Do not get too close (kissing, hugging) to people who are sick.  · Do not share towels or hankies with anyone who is sick.  · Stay away from crowded places.  · Get a flu shot each year.  · Get a pneumonia shot if you havent already, and ask your doctor how often this needs to be repeated.  When do I need to call the doctor?   · Signs of infection. These include a fever of 100.4°F (38°C) or higher, chills, very bad sore throat, cough, more sputum or change in color of sputum.  · Harder time breathing or if you get dizzy or lightheaded  · New pain or swelling in your legs  Teach Back: Helping You Understand   The Teach Back Method helps you understand the information we are giving you. After you talk with the staff, tell them in your own words what you learned. This helps to make sure the staff has described each thing clearly. It also helps to explain things that may have been confusing. Before going home, make sure you can do these:  · I can tell you about  my condition.  · I can tell you what may help ease my breathing.  · I can tell you what I will do if I have trouble breathing or get dizzy.  Where can I learn more?   National Organization of Rare Disorders  http://www.rarediseases.org/rare-disease-information/rare-diseases/byID/611/viewAbstract   NHS Choices  https://www.nhs.uk/conditions/acute-respiratory-distress-syndrome/   Last Reviewed Date   2020-08-24  Consumer Information Use and Disclaimer   This information is not specific medical advice and does not replace information you receive from your health care provider. This is only a brief summary of general information. It does NOT include all information about conditions, illnesses, injuries, tests, procedures, treatments, therapies, discharge instructions or life-style choices that may apply to you. You must talk with your health care provider for complete information about your health and treatment options. This information should not be used to decide whether or not to accept your health care providers advice, instructions or recommendations. Only your health care provider has the knowledge and training to provide advice that is right for you.  Copyright   Copyright © 2021 Up[employer], [employer]. and its affiliates and/or licensors. All rights reserved.  Diane teaching reviewed with Kylie King   .Kylie King   verbalized understanding.    Education was provided based on the patient's discharge diagnosis using the attached Diane patient education as a reference.

## 2022-02-15 NOTE — TELEPHONE ENCOUNTER
----- Message from Celia Castro sent at 2/15/2022  2:34 PM CST -----  Contact: Dionne   Dionne Brennon [facility] is calling to get orders to resume home health for PT faxed

## 2022-02-15 NOTE — TELEPHONE ENCOUNTER
Pt has been scheduled for Dr. Foote's next available hosp f/u appt slot.    She has been added to the wait list for an earlier appt.

## 2022-02-15 NOTE — TELEPHONE ENCOUNTER
----- Message from Iesha Moraes sent at 2/15/2022 11:18 AM CST -----  Contact: [phone]  Pt states she was in the ED and they told her to see her PCP in 7 days there is no available appts but she does have an appt on 03/15 please advise as to what she needs to do and give a return call

## 2022-02-17 PROBLEM — S32.599A PUBIC RAMUS FRACTURE: Status: ACTIVE | Noted: 2022-01-01

## 2022-02-17 PROBLEM — Z91.81 HISTORY OF FALLING: Status: ACTIVE | Noted: 2021-01-01

## 2022-02-17 PROBLEM — S98.912D: Status: ACTIVE | Noted: 2021-01-01

## 2022-02-17 PROBLEM — I25.2 HISTORY OF NON-ST ELEVATION MYOCARDIAL INFARCTION (NSTEMI): Status: ACTIVE | Noted: 2022-01-01

## 2022-02-17 PROBLEM — F17.210 NICOTINE DEPENDENCE, CIGARETTES, UNCOMPLICATED: Status: ACTIVE | Noted: 2021-01-01

## 2022-02-17 PROBLEM — D50.9 IRON DEFICIENCY ANEMIA, UNSPECIFIED: Status: ACTIVE | Noted: 2018-11-26

## 2022-02-17 PROBLEM — Z79.4 TYPE 2 DIABETES MELLITUS WITH DIABETIC PERIPHERAL ANGIOPATHY AND GANGRENE, WITH LONG-TERM CURRENT USE OF INSULIN: Status: ACTIVE | Noted: 2021-01-01

## 2022-02-17 PROBLEM — E11.52 TYPE 2 DIABETES MELLITUS WITH DIABETIC PERIPHERAL ANGIOPATHY AND GANGRENE, WITH LONG-TERM CURRENT USE OF INSULIN: Status: ACTIVE | Noted: 2021-01-01

## 2022-02-17 PROBLEM — F32.0 MAJOR DEPRESSIVE DISORDER, SINGLE EPISODE, MILD: Status: ACTIVE | Noted: 2021-01-01

## 2022-02-17 PROBLEM — E44.1 MILD PROTEIN-CALORIE MALNUTRITION: Status: ACTIVE | Noted: 2019-11-25

## 2022-02-17 PROBLEM — Z99.2 DEPENDENCE ON RENAL DIALYSIS: Status: ACTIVE | Noted: 2021-01-01

## 2022-02-17 NOTE — ASSESSMENT & PLAN NOTE
Takes home lopressor 25 BID. In the past has taken amlodipine 10mg, coreg 12.5mg BID, hydralazine 50mg TID    - HD per nephrology, currently receiving dialysis. Missed recent dialysis sessions.

## 2022-02-17 NOTE — ASSESSMENT & PLAN NOTE
ESKD 2/2 DM, HT, and L hydronephrosis   Outpatient HD Information:  -Dialysis modality: Hemodialysis  -Outpatient HD unit: [facility]  -Nephrologist: Dr. Kong   -HD TX days: Tuesday/Thursday/Saturday, duration of treatment: 4 hours   -Last HD TX prior to hospital admission: 02/10/2022  -Dialysis access: RUE AV fistula  -Residual urine: Anuric   -EDW: 100 Kg       Plan/Recommendations:     -iHD today (02/17/2022) with UF -3L as BP tolerates for metabolic clearance and volume management   -Electrolytes: hyperkalemia 7, should improve with HD, repeat labs in AM   -Mineral & Bone Disorder:   Check Phosphorus and PTH  -ESRD anemia: hgb 9.5, goal hgb 10-11g/dL, check iron studies, transfuse if hgb <7  -Daily renal function panel  -Renal diet, if not NPO   -Strict I/O's and daily weights

## 2022-02-17 NOTE — ASSESSMENT & PLAN NOTE
Please consult /case management as it appears pt is not able to care for herself and coordinate her dialysis care as well (when she's not feeling well)

## 2022-02-17 NOTE — ASSESSMENT & PLAN NOTE
-please perform med rec with pt's caregiver, pt unable to discuss her home meds  -would start her on her some of her antihypertensive medication and anticoagulation if there's no contraindications

## 2022-02-17 NOTE — PROGRESS NOTES
Dialysis started via right arm fistula with 15 gauge needles.  Positive thrill and bruit noted.  Pt is in ED room 19 at this time.  BFR,400, .  Pt lethargic.

## 2022-02-17 NOTE — SUBJECTIVE & OBJECTIVE
Past Medical History:   Diagnosis Date    Anxiety     Breast cancer     left    Carotid artery disease     Cataract     Choledocholithiasis     s/p ERCP and stent placement    Chronic diastolic CHF (congestive heart failure)     Chronic obstructive pulmonary disease     Chronic venous insufficiency     Depression     End-stage renal disease on hemodialysis     M/W/F    GERD (gastroesophageal reflux disease)     History of breast cancer     History of colon cancer 2001    s/p sigmoid colectomy    History of kidney stones     History of osteomyelitis of left foot     History of pancreatitis     History of stroke     Hyperlipidemia     Hypertension     Intracerebral hemorrhage     Lumbar degenerative disc disease     Lumbar spinal stenosis     Morbid obesity     Paroxysmal atrial fibrillation     Peripheral artery disease     Peripheral neuropathy     Tobacco dependence     Type II diabetes mellitus     Urinary incontinence        Past Surgical History:   Procedure Laterality Date    ANGIOGRAM, CORONARY, WITH LEFT HEART CATHETERIZATION N/A 2/7/2022    Procedure: Angiogram, Coronary, with Left Heart Cath;  Surgeon: Adam Rutherford MD;  Location: [facility] CATH LAB;  Service: Cardiology;  Laterality: N/A;    ANGIOGRAPHY OF LOWER EXTREMITY Right 9/17/2020    Procedure: Angiogram Extremity Unilateral;  Surgeon: RICK Pollard III, MD;  Location: [facility] OR [address] [place] [address];  Service: Peripheral Vascular;  Laterality: Right;  6.2 minutes of fluro  690.88 mGy  112.64 Gycm2  55 ml    ANGIOGRAPHY OF LOWER EXTREMITY Left 5/13/2021    Procedure: Angiogram Extremity Unilateral;  Surgeon: HOMERO Hayden II, MD;  Location: [facility] OR [address] [place] [address];  Service: Vascular;  Laterality: Left;  35.1 min  971.41 mGy  190.27 Gy.cm  101ml Dye    ANGIOGRAPHY OF LOWER EXTREMITY Right 7/19/2021    Procedure: Angiogram Extremity Unilateral;  Surgeon: HOMERO Hayden II, MD;  Location: [address];  Service: Vascular;  Laterality:  Right;  3.0 min  121.00 mGy  26.8713 Gy.cm  13ml Dye    AORTOGRAPHY N/A 5/13/2021    Procedure: AORTOGRAM;  Surgeon: HOMERO Hayden II, MD;  [address];  Service: Vascular;  Laterality: N/A;    AV FISTULA PLACEMENT Right 5/28/2018    Procedure: CREATION -FISTULA-AV;  Surgeon: RICK Pollard III, MD;  Location: [address];  Service: Peripheral Vascular;  Laterality: Right;    BREAST BIOPSY  1/2012    left breast invasive mammary carcinoma (lateral bx) and intraductal papilloma (medial bx)    CARDIOVERSION  11/22/2021    CARDIOVERSION  11/22/2021    Procedure: Cardioversion;  Surgeon: Ad Garcia MD;  Location: [facility] CATH LAB;  Service: Cardiology;;    CATARACT EXTRACTION W/  INTRAOCULAR LENS IMPLANT Right 1/24/2019        CATARACT EXTRACTION W/  INTRAOCULAR LENS IMPLANT Left 1/31/2019    Procedure: EXTRACTION, CATARACT, WITH IOL INSERTION;  Surgeon: Immanuel Moncada MD;  Location: [facility];  Service: Ophthalmology;  Laterality: Left;    CHOLECYSTECTOMY  2001    CORONARY ANGIOGRAPHY N/A 2/8/2022    Procedure: ANGIOGRAM, CORONARY ARTERY;  Surgeon: Abelardo Betancur MD;  Location: [facility] CATH LAB;  Service: Cardiology;  Laterality: N/A;    DEBRIDEMENT OF FOOT Right 2/17/2021    Procedure: DEBRIDEMENT, FOOT right plantar ulcer;  Surgeon: Sonja Corral DPM;  Location: [facility];  Service: Podiatry;  Laterality: Right;    ERCP W/ SPHICTEROTOMY      FINE NEEDLE ASPIRATION  1999    Right breast    FOOT AMPUTATION Left 6/1/2021    Procedure: Transmetatarsal amputation;  Surgeon: Billy Robles DPM;  Location: [address];  Service: Podiatry;  Laterality: Left;    FOOT AMPUTATION Left 7/23/2021    Procedure: AMPUTATION, FOOT;  Surgeon: Billy Robles DPM;  [address];  Service: Podiatry;  Laterality: Left;    FOOT AMPUTATION THROUGH METATARSAL Right 10/15/2020    Procedure: PARTIAL RAY AMPUTATION GREAT TOE;  Surgeon: Billy Robles DPM;  Location: [address]  [facility];  Service: Podiatry;  Laterality: Right;  Stretcher OK    HERNIA REPAIR      LAPAROSCOPIC NEPHRECTOMY Left 2011    MASTECTOMY  2013    left    OOPHORECTOMY Left 2001    REVISION OF ARTERIOVENOUS FISTULA Right 8/15/2018    Procedure: REVISION, AV FISTULA;  Surgeon: RICK Pollard III, MD;  Location: [facility] OR 2ND FLR;  Service: Peripheral Vascular;  Laterality: Right;    SIGMOIDECTOMY  2001    SURGICAL REMOVAL OF METATARSAL HEAD Right 2/17/2021    Procedure: OSTECTOMY, METATARSAL BONE, diatal 1st;  Surgeon: Sonja Corral DPM;  Location: [facility] OR;  Service: Podiatry;  Laterality: Right;    TOE AMPUTATION Left 5/13/2021    Procedure: AMPUTATION, TOE;  Surgeon: HOMERO Hayden II, MD;  Location: [facility] OR 2ND FLR;  Service: Vascular;  Laterality: Left;    TOTAL REDUCTION MAMMOPLASTY Right 2013       Review of patient's allergies indicates:   Allergen Reactions    Cyclobenzaprine Hallucinations    Erythromycin      Stomach upset    Keflex [cephalexin]      Yeast infection    Erythromycin base      Other reaction(s): upset stomach     Current Facility-Administered Medications   Medication Frequency    0.9%  NaCl infusion Once    dextrose 10% bolus 250 mL PRN    heparin (porcine) injection 1,000 Units PRN    sodium chloride 0.9% bolus 250 mL PRN     Current Outpatient Medications   Medication    acetaminophen-codeine 300-30mg (TYLENOL #3) 300-30 mg Tab    albuterol (PROVENTIL/VENTOLIN HFA) 90 mcg/actuation inhaler    amiodarone (PACERONE) 200 MG Tab    apixaban (ELIQUIS) 2.5 mg Tab    aspirin (ECOTRIN) 81 MG EC tablet    atorvastatin (LIPITOR) 40 MG tablet    benzonatate (TESSALON) 100 MG capsule    blood sugar diagnostic Strp    blood-glucose sensor (DEXCOM G6 SENSOR) Umm    blood-glucose transmitter (DEXCOM G6 TRANSMITTER) Umm    citalopram (CELEXA) 20 MG tablet    clopidogreL (PLAVIX) 75 mg tablet    famotidine (PEPCID) 20 MG tablet    fish oil-omega-3 fatty acids 300-1,000 mg capsule     flash glucose scanning reader (FREESTYLE LULU 14 DAY READER) Misc    flash glucose scanning reader (FREESTYLE LULU 2 READER) Misc    flash glucose sensor (FREESTYLE LULU 14 DAY SENSOR) Kit    flash glucose sensor (FREESTYLE LULU 2 SENSOR) Kit    gabapentin (NEURONTIN) 300 MG capsule    guaiFENesin (MUCINEX) 600 mg 12 hr tablet    insulin aspart U-100 (NOVOLOG) 100 unit/mL (3 mL) InPn pen    insulin detemir U-100 (LEVEMIR FLEXTOUCH) 100 unit/mL (3 mL) SubQ InPn pen    lancets (ONETOUCH DELICA LANCETS) 33 gauge Misc    letrozole (FEMARA) 2.5 mg Tab    methocarbamoL (ROBAXIN) 500 MG Tab    metoprolol tartrate (LOPRESSOR) 25 MG tablet    nitroGLYCERIN (NITROSTAT) 0.3 MG SL tablet    senna-docusate 8.6-50 mg (PERICOLACE) 8.6-50 mg per tablet    sevelamer carbonate (RENVELA) 800 mg Tab     Family History     Problem Relation (Age of Onset)    Arthritis Mother    Breast cancer Maternal Grandmother    Cancer Maternal Grandmother, Maternal Aunt    Celiac disease Sister    Diabetes Father    Heart disease Mother, Father, Maternal Grandmother        Tobacco Use    Smoking status: Current Some Day Smoker     Packs/day: 0.50     Years: 28.00     Pack years: 14.00     Types: Cigarettes     Start date: 1990     Last attempt to quit: 2018     Years since quittin.1    Smokeless tobacco: Never Used    Tobacco comment: anxious   Substance and Sexual Activity    Alcohol use: No    Drug use: No    Sexual activity: Not Currently     Partners: Male     Review of Systems as per HPI  Objective:     Vital Signs (Most Recent):  Temp: 98 °F (36.7 °C) (22)  Pulse: 72 (22)  Resp: 20 (22)  BP: (!) 196/83 (22)  SpO2: 95 % (22)  O2 Device (Oxygen Therapy): nasal cannula (22) Vital Signs (24h Range):  Temp:  [97.6 °F (36.4 °C)-98 °F (36.7 °C)] 98 °F (36.7 °C)  Pulse:  [72-76] 72  Resp:  [20-25] 20  SpO2:  [93 %-100 %] 95 %  BP: (164-196)/(73-83)  196/83     Weight: 102.5 kg (226 lb) (02/17/22 1315)  Body mass index is 38.79 kg/m².  Body surface area is 2.15 meters squared.    No intake/output data recorded.    Physical Exam  Vitals and nursing note reviewed.   Constitutional:       General: She is not in acute distress.     Appearance: Normal appearance. She is ill-appearing. She is not toxic-appearing or diaphoretic.   Cardiovascular:      Rate and Rhythm: Normal rate and regular rhythm.      Pulses: Normal pulses.      Heart sounds: Murmur heard.       Pulmonary:      Effort: Pulmonary effort is normal. No respiratory distress.      Breath sounds: Rales present. No wheezing or rhonchi.      Comments: Decreased breath sounds  Abdominal:      General: Abdomen is flat. Bowel sounds are normal. There is no distension.      Palpations: Abdomen is soft. There is no mass.      Tenderness: There is no abdominal tenderness. There is no guarding or rebound.   Musculoskeletal:         General: Swelling and deformity present. No tenderness.      Right lower leg: No edema.      Left lower leg: No edema.      Comments: L foot amputation   Skin:     General: Skin is warm.      Capillary Refill: Capillary refill takes 2 to 3 seconds.      Coloration: Skin is not jaundiced or pale.      Findings: Lesion and rash present. No bruising or erythema.      Comments: Venous congestion maximilian LEs, no open wounds noted   Neurological:      Mental Status: She is alert.      Comments: Oriented to self  Sleeping most of the time during interview          Significant Labs:  CBC:   Recent Labs   Lab 02/17/22  1503 02/17/22  1510 02/17/22  1555   WBC 8.74  --   --    RBC 3.44*  --   --    HGB 9.5*  --   --    HCT 32.2*   < > 30*     --   --    MCV 94  --   --    MCH 27.6  --   --    MCHC 29.5*  --   --     < > = values in this interval not displayed.     CMP:   Recent Labs   Lab 02/17/22  1503   *   CALCIUM 9.1   ALBUMIN 3.4*   PROT 7.0      K 7.0*   CO2 19*   CL 97    *   CREATININE 13.8*   ALKPHOS 89   ALT 8*   AST 14   BILITOT 0.8     Microbiology Results (last 7 days)     ** No results found for the last 168 hours. **        PTH: No results for input(s): PTH in the last 168 hours.  TSH: No results for input(s): TSH in the last 168 hours.  All labs within the past 24 hours have been reviewed.    Significant Imaging:  Labs: Reviewed  ECG: Reviewed  X-Ray: Reviewed

## 2022-02-17 NOTE — Clinical Note
Diagnosis: Hyperkalemia [873519]   Admitting Provider:: EDUARDA DURANT [[id number]]   Future Attending Provider: GREGORIO MCCLOUD [[id number]]   Reason for IP Medical Treatment  (Clinical interventions that can only be accomplished in the IP setting? ) :: falls, missed dialysis   Estimated Length of Stay:: 2 midnights   I certify that Inpatient services for greater than or equal to 2 midnights are medically necessary:: Yes   Plans for Post-Acute care--if anticipated (pick the single best option):: A. No post acute care anticipated at this time   Special Needs:: No Special Needs [1]

## 2022-02-17 NOTE — HPI
Kylie King is a 66 y.o. year old female past medical history of CAD s/p PCI on 02/08/2022 (on DAPT), left breast cancer s/p mastectomy, chronic respiratory failure (on home 2L of O2), COPD, ESRD on HD TTS w/ anemia of chronic disease, Atrial Fib (failed DCCV in Nov 2021); on eliquis and amiodarone, CVA, PVD (s/p left foot amputation on Plavix), HTN, T2DM, HLD, who presented to the ED for generalized weakness and multiple falls. Patient was somnolent upon our interview. History was obtained from chart review. She reportedly last received HD due to her debility. Her last HD was about a week ago. Per ED nurse, patient was conversational upon arrival but suddenly became somnolent after about an hour, which is somewhat similar to her presentation during her most recent hospital admission.   Of note, the patient was admitted to hospital medicine on 02/05 for hyperkalemia and hypervolemia in the setting of missed HD, NSTEMI for which she underwent LHC on 02/07 and 02/08 with successful treatment of critical ostial left circumflex disease by doing V stenting of the ostial left main and left circumflex on 02/08. She was discharged with aspirin, plavix and to continue eliquis for Atrial fibrillation with f/u with cardiology. The patient refused HD the day prior to hospital discharge on 12/08.      Upon arrival, she was hemodynamically stable, however was hypertensive. Labs revealed potassium of 7.0. . ABG with pH of 7.3, Co2 49, BNP 4000s. ECG without hyperkalemic changes but revealed atrial fibrillation. CXR appeared grossly same from prior, though not an optimal study due to rotation. CT Head was unremarkable. CT A/P revealed probable subacute fracture of the right inferior pubic ramus. She was shifted for hyperkalemia and received calcium gluc.  Nephrology was consulted and she was initiated on HD. Patient was admitted to MICU for further management of hyperkalemia and encephalopathy.

## 2022-02-17 NOTE — PROVIDER PROGRESS NOTES - EMERGENCY DEPT.
Encounter Date: 2/17/2022    ED Physician Progress Notes        Physician Note:   Assumed care of patient at 3p sign out from Dr. Denis pending admission.  Patient originally admitted to Hospital Medicine.    On reassessment, out of concern for her mental status, ICU consulted.   Patient wakes to voice.   Hyperkalemia shifted, nephrology consulted for emergent dialysis.   I discussed the case with ICU, who accepted the patient for admission.   Patient currently receiving hemodialysis in the ED.

## 2022-02-17 NOTE — HPI
"Kylie King is a 66 y.o. female w/ PMHx ESKD 2/2 DM II, HTN, and L hydronephrosis on HD TTS, Diabetic neuropathy s/p L foot amputation, COPD on 2L O2 at home, colon CA, Afib, Hyperlipidemia, ?CVA, anemia, hyperthyroidism, COVID (2021), venous insufficiency, PAD, MDD, vitamin D deficiency, and obesity Admitted on 2/17/2022 via EMS from home for generalized weakness s/p fall today (2/17) and shortness of breath.       Pt states having abd and pelvic pain, too sleepy to answer much of the questions regarding her pain. She has shortness of breath that has gotten worse in the last couple of days. Pt is also complaining of fatigue and weakness x1 week.     Of note, pt was recently discharged on 02/08/2022 after being treated for Afib and NSTEMI. She had a PCI to the L Cx and LAD on 2/8/2022.   In the ED, pt was found to have stools in her clothing. Her BP was 180/81 and her pulse was 76, she was on 2L of oxygen with SATs in the mid 80's while sleeping, she was afebrile. Her initial POC labs showed a K level of 6.8 and on repeat with serum chemistry her K was 7, , and Cr 13.8. Pt's BNP was 4114 and troponin level was 0.401  Since pt was too sleepy a venous gas was done to assess PCO2 level, her level was 49.   Her chest x-ray showed some pulmonary congestion, cardiomegaly (pt is rotated as well). Her CT abd & pelvis showed signs of subacute pelvic fracture, anasarca, and hepatosplenomegaly.     Nephrology was consulted for: "needs emergent dialysis for hyperK"    Outpatient HD Information:  -Dialysis modality: Hemodialysis  -Outpatient HD unit: [facility]  -Nephrologist: Dr. Kong   -HD TX days: Tuesday/Thursday/Saturday, duration of treatment: 4 hours   -Last HD TX prior to hospital admission: 02/10/2022  -Dialysis access: RUE AV fistula  -Residual urine: Anuric   -EDW: 100 Kg       " [FreeTextEntry1] : declines birth control

## 2022-02-17 NOTE — ED NOTES
I-STAT Chem-8+ Results:   Value Reference Range   Sodium 136 136-145 mmol/L   Potassium  6.5 3.5-5.1 mmol/L   Chloride 103  mmol/L   Ionized Calcium 0.93 1.06-1.42 mmol/L   CO2 (measured) 25 23-29 mmol/L   Glucose 117  mg/dL    6-30 mg/dL   Creatinine 14.0 0.5-1.4 mg/dL   Hematocrit 30 36-54%

## 2022-02-17 NOTE — ED PROVIDER NOTES
CC: Fall (Has fallen repeatedly in the past couple of weeks. Dialysis pt. Has not received dialysis in 1 week. Pt. Reports feeling weak lately. Also reports right sided abdominal pain. )      History provided by:   Patient     HPI: Kylie King is a 66 y.o. year old female past medical history of left breast cancer, CHF, COPD , end-stage renal disease on dialysis , history of colon cancer, history of stroke, history of pancreatitis, hyperlipidemia, paroxysmal AFib who presents to the ED for generalized weakness, multiple falls    Patient reports recent hospitalization with cardiac stent placement, she was unable to follow up to have dialysis as she has been feeling weak with multiple falls: usually she slips  from bed or from chair to the floor, has not been able to ambulate well despite having physiotherapy at home.  She reports she fells on her buttocks, no head injury or neck injury no back injury  Right-sided weakness for the last 3 weeks  She also reports right upper abdominal pain, sharp, nonradiating, has history of cholecystectomy     last dialysis last Saturday more than a week ago  No nausea no vomiting, 4 episodes of diarrhea in 1 week        Recent admission to the hospital from February 4th to February 8, 2022 for acute on chronic respiratory failure, NSTEMI, Afib.  Patient with chronic respiratory failure on 2 L of oxygen, AFib on Eliquis and amiodarone, patient is anuric    Past Medical History:   Diagnosis Date    Anxiety     Breast cancer     left    Carotid artery disease     Cataract     Choledocholithiasis     s/p ERCP and stent placement    Chronic diastolic CHF (congestive heart failure)     Chronic obstructive pulmonary disease     Chronic venous insufficiency     Depression     End-stage renal disease on hemodialysis     M/W/F    GERD (gastroesophageal reflux disease)     History of breast cancer     History of colon cancer 2001    s/p sigmoid colectomy    History of kidney  stones     History of osteomyelitis of left foot     History of pancreatitis     History of stroke     Hyperlipidemia     Hypertension     Intracerebral hemorrhage     Lumbar degenerative disc disease     Lumbar spinal stenosis     Morbid obesity     Paroxysmal atrial fibrillation     Peripheral artery disease     Peripheral neuropathy     Tobacco dependence     Type II diabetes mellitus     Urinary incontinence      Past Surgical History:   Procedure Laterality Date    ANGIOGRAM, CORONARY, WITH LEFT HEART CATHETERIZATION N/A 2/7/2022    Procedure: Angiogram, Coronary, with Left Heart Cath;  Surgeon: Adam Rutherford MD;  Location: [facility] CATH LAB;  Service: Cardiology;  Laterality: N/A;    ANGIOGRAPHY OF LOWER EXTREMITY Right 9/17/2020    Procedure: Angiogram Extremity Unilateral;  Surgeon: RICK Pollard III, MD;  Location: [address];  Service: Peripheral Vascular;  Laterality: Right;  6.2 minutes of fluro  690.88 mGy  112.64 Gycm2  55 ml    ANGIOGRAPHY OF LOWER EXTREMITY Left 5/13/2021    Procedure: Angiogram Extremity Unilateral;  Surgeon: HOMERO Hayden II, MD;  Location: [address];  Service: Vascular;  Laterality: Left;  35.1 min  971.41 mGy  190.27 Gy.cm  101ml Dye    ANGIOGRAPHY OF LOWER EXTREMITY Right 7/19/2021    Procedure: Angiogram Extremity Unilateral;  Surgeon: HOMERO Hayden II, MD;  Location: [address];  Service: Vascular;  Laterality: Right;  3.0 min  121.00 mGy  26.8713 Gy.cm  13ml Dye    AORTOGRAPHY N/A 5/13/2021    Procedure: AORTOGRAM;  Surgeon: HOMERO Hayden II, MD;  Location: [facility] OR [address] [place] [address];  Service: Vascular;  Laterality: N/A;    AV FISTULA PLACEMENT Right 5/28/2018    Procedure: CREATION -FISTULA-AV;  Surgeon: RICK Pollard III, MD;  Location: [address];  Service: Peripheral Vascular;  Laterality: Right;    BREAST BIOPSY  1/2012    left breast invasive mammary carcinoma (lateral bx) and intraductal papilloma (medial bx)     CARDIOVERSION  11/22/2021    CARDIOVERSION  11/22/2021    Procedure: Cardioversion;  Surgeon: Ad Garcia MD;  Location: [facility] CATH LAB;  Service: Cardiology;;    CATARACT EXTRACTION W/  INTRAOCULAR LENS IMPLANT Right 1/24/2019        CATARACT EXTRACTION W/  INTRAOCULAR LENS IMPLANT Left 1/31/2019    Procedure: EXTRACTION, CATARACT, WITH IOL INSERTION;  Surgeon: Immanuel Moncada MD;  Location: [facility] OR;  Service: Ophthalmology;  Laterality: Left;    CHOLECYSTECTOMY  2001    CORONARY ANGIOGRAPHY N/A 2/8/2022    Procedure: ANGIOGRAM, CORONARY ARTERY;  Surgeon: Abelardo Betancur MD;  Location: [facility] CATH LAB;  Service: Cardiology;  Laterality: N/A;    DEBRIDEMENT OF FOOT Right 2/17/2021    Procedure: DEBRIDEMENT, FOOT right plantar ulcer;  Surgeon: Sonja Corral DPM;  Location: [facility] OR;  Service: Podiatry;  Laterality: Right;    ERCP W/ SPHICTEROTOMY      FINE NEEDLE ASPIRATION  1999    Right breast    FOOT AMPUTATION Left 6/1/2021    Procedure: Transmetatarsal amputation;  Surgeon: Billy Robles DPM;  [facility];  Service: Podiatry;  Laterality: Left;    FOOT AMPUTATION Left 7/23/2021    Procedure: AMPUTATION, FOOT;  Surgeon: Billy Robles DPM;  [facility];  Service: Podiatry;  Laterality: Left;    FOOT AMPUTATION THROUGH METATARSAL Right 10/15/2020    Procedure: PARTIAL RAY AMPUTATION GREAT TOE;  Surgeon: Billy Robles DPM;  [facility];  Service: Podiatry;  Laterality: Right;  Stretcher OK    HERNIA REPAIR      LAPAROSCOPIC NEPHRECTOMY Left 2011    MASTECTOMY  2013    left    OOPHORECTOMY Left 2001    REVISION OF ARTERIOVENOUS FISTULA Right 8/15/2018    Procedure: REVISION, AV FISTULA;  Surgeon: RICK Pollard III, MD;  [facility];  Service: Peripheral Vascular;  Laterality: Right;    SIGMOIDECTOMY  2001    SURGICAL REMOVAL OF METATARSAL HEAD Right 2/17/2021    Procedure: OSTECTOMY, METATARSAL BONE, diatal 1st;  Surgeon:  Sonja Corral DPM;  [facility];  Service: Podiatry;  Laterality: Right;    TOE AMPUTATION Left 5/13/2021    Procedure: AMPUTATION, TOE;  Surgeon: HOMERO Hayden II, MD;  Location: [address];  Service: Vascular;  Laterality: Left;    TOTAL REDUCTION MAMMOPLASTY Right 2013     Family History   Problem Relation Age of Onset    Arthritis Mother     Heart disease Mother     Diabetes Father     Heart disease Father     Celiac disease Sister     Breast cancer Maternal Grandmother         possible-  patient unsure    Cancer Maternal Grandmother     Heart disease Maternal Grandmother     Cancer Maternal Aunt     Ovarian cancer Neg Hx     Glaucoma Neg Hx     Macular degeneration Neg Hx      No current facility-administered medications on file prior to encounter.     Current Outpatient Medications on File Prior to Encounter   Medication Sig Dispense Refill    acetaminophen-codeine 300-30mg (TYLENOL #3) 300-30 mg Tab Take 1 tablet by mouth every 8 (eight) hours as needed. 90 tablet 1    albuterol (PROVENTIL/VENTOLIN HFA) 90 mcg/actuation inhaler Inhale 2 puffs into the lungs every 6 (six) hours as needed for Wheezing. Rescue 18 g 3    amiodarone (PACERONE) 200 MG Tab Take 1 tablet (200 mg total) by mouth once daily. 90 tablet 3    apixaban (ELIQUIS) 2.5 mg Tab Take 1 tablet (2.5 mg total) by mouth 2 (two) times daily. 60 tablet 1    aspirin (ECOTRIN) 81 MG EC tablet Take 1 tablet (81 mg total) by mouth once daily. Take until 3/8/22, then stop unless further instructed by provider. 30 tablet 0    atorvastatin (LIPITOR) 40 MG tablet Take 1 tablet (40 mg total) by mouth once daily. 90 tablet 3    benzonatate (TESSALON) 100 MG capsule Take 1 capsule (100 mg total) by mouth 3 (three) times daily as needed for Cough.      blood sugar diagnostic Strp 1 strip by Misc.(Non-Drug; Combo Route) route 4 (four) times daily. 150 strip 3    blood-glucose sensor (DEXCOM G6 SENSOR) Umm 1 each by  Misc.(Non-Drug; Combo Route) route every 10 days. 3 each 11    blood-glucose transmitter (DEXCOM G6 TRANSMITTER) Umm 1 Device by Misc.(Non-Drug; Combo Route) route continuous. 1 Device 3    citalopram (CELEXA) 20 MG tablet Take 1 tablet (20 mg total) by mouth nightly. 90 tablet 3    clopidogreL (PLAVIX) 75 mg tablet Take 1 tablet (75 mg total) by mouth once daily. 90 tablet 3    famotidine (PEPCID) 20 MG tablet Take 1 tablet (20 mg total) by mouth nightly as needed. 90 tablet 3    fish oil-omega-3 fatty acids 300-1,000 mg capsule Take 1 capsule by mouth every morning.      flash glucose scanning reader (FREESTYLE LULU 14 DAY READER) Misc Use as directed to check blood sugars 2 each 11    flash glucose scanning reader (FREESTYLE LULU 2 READER) Misc 1 Units by Misc.(Non-Drug; Combo Route) route continuous prn. 1 each 0    flash glucose sensor (FREESTYLE LULU 14 DAY SENSOR) Kit Use as directed to check blood sugars 2 kit 11    flash glucose sensor (FREESTYLE LULU 2 SENSOR) Kit 1 Units by Misc.(Non-Drug; Combo Route) route every 14 (fourteen) days. 2 kit 11    gabapentin (NEURONTIN) 300 MG capsule Take 1 capsule (300 mg total) by mouth every evening. 60 capsule 0    guaiFENesin (MUCINEX) 600 mg 12 hr tablet Take 1 tablet (600 mg total) by mouth 2 (two) times daily.      insulin aspart U-100 (NOVOLOG) 100 unit/mL (3 mL) InPn pen Inject 3 Units into the skin 3 (three) times daily with meals. 5.4 mL 0    insulin detemir U-100 (LEVEMIR FLEXTOUCH) 100 unit/mL (3 mL) SubQ InPn pen Inject 4 Units into the skin 2 (two) times daily. 4.8 mL 0    lancets (ONETOUCH DELICA LANCETS) 33 gauge Misc 1 lancet by Misc.(Non-Drug; Combo Route) route 4 (four) times daily before meals and nightly. 400 each 4    letrozole (FEMARA) 2.5 mg Tab Take 1 tablet (2.5 mg total) by mouth once daily.      methocarbamoL (ROBAXIN) 500 MG Tab Take 500 mg by mouth 3 (three) times daily.      metoprolol tartrate (LOPRESSOR) 25 MG tablet  Take 1 tablet (25 mg total) by mouth 2 (two) times daily. 60 tablet 2    nitroGLYCERIN (NITROSTAT) 0.3 MG SL tablet Place 1 tablet (0.3 mg total) under the tongue every 5 (five) minutes as needed for Chest pain. 15 tablet 0    senna-docusate 8.6-50 mg (PERICOLACE) 8.6-50 mg per tablet Take 1 tablet by mouth 2 (two) times daily. (Patient not taking: Reported on 2022)      sevelamer carbonate (RENVELA) 800 mg Tab Take 2 tablets (1,600 mg total) by mouth 3 (three) times daily with meals. (Patient not taking: Reported on 2022) 180 tablet 11     Cyclobenzaprine, Erythromycin, Keflex [cephalexin], and Erythromycin base  Social History     Socioeconomic History    Marital status: Single    Number of children: 2   Occupational History    Occupation: not working     Employer: [email or web address]   Tobacco Use    Smoking status: Current Some Day Smoker     Packs/day: 0.50     Years: 28.00     Pack years: 14.00     Types: Cigarettes     Start date: 1990     Last attempt to quit: 2018     Years since quittin.1    Smokeless tobacco: Never Used    Tobacco comment: anxious   Substance and Sexual Activity    Alcohol use: No    Drug use: No    Sexual activity: Not Currently     Partners: Male   Social History Narrative    She is not exercising regularly     Social Determinants of Health     Financial Resource Strain: Low Risk     Difficulty of Paying Living Expenses: Not hard at all   Food Insecurity: No Food Insecurity    Worried About Running Out of Food in the Last Year: Never true    Ran Out of Food in the Last Year: Never true   Transportation Needs: No Transportation Needs    Lack of Transportation (Medical): No    Lack of Transportation (Non-Medical): No   Physical Activity: Insufficiently Active    Days of Exercise per Week: 3 days    Minutes of Exercise per Session: 20 min   Stress: No Stress Concern Present    Feeling of Stress : Only a little   Social Connections: Moderately Isolated     Frequency of Communication with Friends and Family: More than three times a week    Frequency of Social Gatherings with Friends and Family: Twice a week    Attends Scientology Services: Never    Active Member of Clubs or Organizations: No    Attends Club or Organization Meetings: Never    Marital Status:    Housing Stability: Low Risk     Unable to Pay for Housing in the Last Year: No    Number of Places Lived in the Last Year: 1    Unstable Housing in the Last Year: No       ROS:     Constitutional : neg for fever, generalized weakness right-sided more than left for 3 weeks  HENT neg for head injury, neg for sore throat  Eyes: neg for visual changes, neg for eye pain  Resp neg for SOB, neg for cough  Cardiac  neg for chest pain, neg for palpitations  GI pos for abd pain, neg for nausea, neg for vomiting   anuric  Neuro generalized weakness but also right side >left x 3 weeks  Skin neg for skin rash  MSK: neg for myalgia, neg for arthralgia  ALL: Cyclobenzaprine, Erythromycin, Keflex [cephalexin], and Erythromycin base    PHYSICAL EXAM:  Vitals:    02/17/22 1315   BP: (!) 180/81   Pulse: 76   Resp: (!) 25   Temp: 97.6 °F (36.4 °C)     VS: triage VS reviewed    general:  Frail, chronically sick  HENT: neck symmetric, trachea midline  Eyes: PERRL,no conjunctival injection and symmetrical eyelids  CV: RRR, no  murmurs, no rubs, no gallops, no LE edema  Resp: comfortable breathing, speaks in full sentences, mild bibasilar crackles  ABD:  soft, ND, no masses, + normal BS, right upper quadrant tenderness to palpation  Renal: No CVAT, RUE dialysis access with good bruit  Neuro: AAO x 3, 5/5 strength upper extremities and 3/5 strength in the lower extremities, sensation intact, face symmetric, speech normal. Mild bilateral hand tremor  MSK: NC/AT, extremities w/out deformity   Skin: warm, dry. Multiple small ecchymosis  Right upper extremity dialysis access with good bruit  Left groin with ecchymosis no  tenderness to palpation  No midline C-spine T-spine or L-spine tenderness to palpation  Left foot midfoot amputation with healed wound        DATA & INTERVENTIONS:    LABS reviewed:  Labs Reviewed - No data to display    RADIOLOGY reviewed:  Imaging Results    None         MEDICATIONS/FLUIDS:  Medications - No data to display      MDM:  Kylie King is a 66 y.o. year old female who presents to the ED for generalized weakness, she feels her right side is weaker than the left for the last 3 weeks, multiple falls generalized weakness unable to go to dialysis  Right upper quadrant abdominal pain history of cholecystectomy in the past  On 2 L oxygen with normal ox sat    DDX includes but not limited to: infectious, metabolic, acs, electrolyte abn    Labs ordered and reviewed:   Cbc w/ normal wbc and plt  Hg 9.5 at abseline  I-STAT initially with potassium of 6.8, calcium gluconate ordered  Repeat i-STAT potassium was 6.5, insulin and zirconium ordered. Nephrology consulted for emergent dialysis  CMP with potassium of 7, elevated creatinine and anion gap acidosis with bicarbonate of 19  Troponin elevated 0.4 however decreased from earlier in February.  Likely trending down from previous troponin elevation  BNP elevated 4114  Lactate normal  Point of care COVID negative  Medication given in the ED: Ca glu, zirconium, insulin, D 50    CT abdomen pelvis (ordered and reviewed):Probable subacute fracture of the right inferior pubic ramus.     Bibasilar atelectasis with superimposed ground-glass opacities, nonspecific and can be seen with aspiration, infection, or noninfectious inflammation.   Mild cardiomegaly.  Multi-vessel coronary artery calcific atherosclerosis.   Mild hepatosplenomegaly.   Stable bilateral adrenal gland nodules, the right consistent with an adenoma.  The left measures slightly greater than typical for an adenoma.  Serial follow-up would seem reasonable. Status post left nephrectomy.  Multiple stable  prominent to mildly enlarged periaortic lymph nodes.  No mass within the nephrectomy bed. Moderate anasarca.   Severe calcific atherosclerosis of the aorta, mesenteric vessels, and bilateral lower extremity vessels.      CT head no acute  Imagings independently visualized: n/a    Bedside Ultrasound (see report under imaging tab in Epic): YES, peripheral IV    EKG (independantly reviewed): regular, wide qrs.     Old records obtained and reviewed: yes, recent admission    Case discussed with the consultant: nephrology    istat w/ K 6.8  Ca gluc    Rpt istat to ensure no false positive    Patient missed dialysis for approximately 1 week, presents with generalized weakness, multiple falls however usually slip and fall from the bed or from the chair with landing on her buttocks no trunk neck or head injuries.  Patient is on anticoagulation, compliant with her medications  Secondary to generalized weakness she has missed dialysis, EKG with QRS of 120 wider from prior.  Initial i-STAT with potassium of 6.8,  repeat 6.5,  potassium shifting with insulin and zirconium, CMP with potassium of 7. Nephrology consulted for emergent dialysis.  Patient denies any shortness of breath oxygen saturation on her home 2 L of oxygen is normal  accepted for admission to , however, nephrology in the ED, patient somnolent on their exam, VBG pending. Will keep the patient in the ED until nephrology can dialysis the patient, might need medical ICU admission if they cannot dialyze the patient. Unsure about the etiology of the somnolence, patient with no reported HA, CT head no acute, patient was awake and able to provide full history on arrival, no cp/sob/wheezing. Possibly metabolic, unlikely hypoglycemia (she did not receive insulin yet)  Patient was signed-out to Dr. Clemens at the change of shift with plan for:  VBG, pending reassessment and dialysis        IMPRESSION:  1.) Fatigue, generalized weakness  2.) Hyperkalemia  3. Right inf pubic  monroe subacute fx    Dispo:admit    Aggregate Critical Care Time by Attending (exclusive of procedural time) = 60 minutes         During the Emergency Depment visit, there was a high probability of imminent or life threatening deterioration in the patient's condition necessitating medical decision  making of a high complexity. Organ systems that were compromised/potentially compromised                      central nervous system                      cardiovascular                      metabolic                      Renal                      electrolytes     and/or there was potential for sepsis or metabolic failure.     Pt required constant monitoring because of the potential for them to deteriorate at any moment. Critical care was time spent personally by me on the following activities:  Development of treatment plan with patient; discussion with consultant, evaluation of patient's response to treatment, examination of patient, obtaining history from patient, ordering and performing treatments and interventions, ordering and reviewing of laboratory studies, ordering and review of radiographic studies, vitals, re-evaluation of patient' condition and review of old charts            The failure to initiate the interventions performed in the Emergency Department on an urgent basis would have likely resulted in sudden, clinically significant or life threatening deterioration in the patient's condition.                              Terra Denis MD  02/17/22 0049

## 2022-02-17 NOTE — CONSULTS
"[facility] - Emergency Dept  Nephrology  Consult Note    Patient Name: Kylie King  MRN: [id number]  Admission Date: 2/17/2022  Hospital Length of Stay: 0 days  Attending Provider: Terra Denis MD   Primary Care Physician: Virginia Foote MD  Principal Problem:<principal problem not specified>    Inpatient consult to Nephrology  Consult performed by: Jeri Vasquez MD  Consult ordered by: Terra Denis MD  Reason for consult: "needs emergent dialysis for hyperK"  Assessment/Recommendations: Please see consult note and staff attestation below for full recommendations. Thank you!         Subjective:     HPI: Kylie King is a 66 y.o. female w/ PMHx ESKD 2/2 DM II, HTN, and L hydronephrosis on HD TTS, Diabetic neuropathy s/p L foot amputation, COPD on 2L O2 at home, colon CA, Afib, Hyperlipidemia, ?CVA, anemia, hyperthyroidism, COVID (2021), venous insufficiency, PAD, MDD, vitamin D deficiency, and obesity Admitted on 2/17/2022 via EMS from home for generalized weakness s/p fall today (2/17) and shortness of breath.       Pt states having abd and pelvic pain, too sleepy to answer much of the questions regarding her pain. She has shortness of breath that has gotten worse in the last couple of days. Pt is also complaining of fatigue and weakness x1 week.     Of note, pt was recently discharged on 02/08/2022 after being treated for Afib and NSTEMI. She had a PCI to the L Cx and LAD on 2/8/2022.   In the ED, pt was found to have stools in her clothing. Her BP was 180/81 and her pulse was 76, she was on 2L of oxygen with SATs in the mid 80's while sleeping, she was afebrile. Her initial POC labs showed a K level of 6.8 and on repeat with serum chemistry her K was 7, , and Cr 13.8. Pt's BNP was 4114 and troponin level was 0.401  Since pt was too sleepy a venous gas was done to assess PCO2 level, her level was 49.   Her chest x-ray showed some pulmonary congestion, cardiomegaly (pt is rotated as well). " "Her CT abd & pelvis showed signs of subacute pelvic fracture, anasarca, and hepatosplenomegaly.     Nephrology was consulted for: "needs emergent dialysis for hyperK"    Outpatient HD Information:  -Dialysis modality: Hemodialysis  -Outpatient HD unit: [facility]  -Nephrologist: Dr. Kong   -HD TX days: Tuesday/Thursday/Saturday, duration of treatment: 4 hours   -Last HD TX prior to hospital admission: 02/10/2022  -Dialysis access: RUE AV fistula  -Residual urine: Anuric   -EDW: 100 Kg           Past Medical History:   Diagnosis Date    Anxiety     Breast cancer     left    Carotid artery disease     Cataract     Choledocholithiasis     s/p ERCP and stent placement    Chronic diastolic CHF (congestive heart failure)     Chronic obstructive pulmonary disease     Chronic venous insufficiency     Depression     End-stage renal disease on hemodialysis     M/W/F    GERD (gastroesophageal reflux disease)     History of breast cancer     History of colon cancer 2001    s/p sigmoid colectomy    History of kidney stones     History of osteomyelitis of left foot     History of pancreatitis     History of stroke     Hyperlipidemia     Hypertension     Intracerebral hemorrhage     Lumbar degenerative disc disease     Lumbar spinal stenosis     Morbid obesity     Paroxysmal atrial fibrillation     Peripheral artery disease     Peripheral neuropathy     Tobacco dependence     Type II diabetes mellitus     Urinary incontinence        Past Surgical History:   Procedure Laterality Date    ANGIOGRAM, CORONARY, WITH LEFT HEART CATHETERIZATION N/A 2/7/2022    Procedure: Angiogram, Coronary, with Left Heart Cath;  Surgeon: Adam Rutherford MD;  Location: [facility] CATH LAB;  Service: Cardiology;  Laterality: N/A;    ANGIOGRAPHY OF LOWER EXTREMITY Right 9/17/2020    Procedure: Angiogram Extremity Unilateral;  Surgeon: RICK Pollard III, MD;  Location: [facility] OR [address] [place] [address];  Service: Peripheral Vascular; "  Laterality: Right;  6.2 minutes of fluro  690.88 mGy  112.64 Gycm2  55 ml    ANGIOGRAPHY OF LOWER EXTREMITY Left 5/13/2021    Procedure: Angiogram Extremity Unilateral;  Surgeon: HOMERO Hayden II, MD;  Location: [address];  Service: Vascular;  Laterality: Left;  35.1 min  971.41 mGy  190.27 Gy.cm  101ml Dye    ANGIOGRAPHY OF LOWER EXTREMITY Right 7/19/2021    Procedure: Angiogram Extremity Unilateral;  Surgeon: HOMERO Hayden II, MD;  Location: [address];  Service: Vascular;  Laterality: Right;  3.0 min  121.00 mGy  26.8713 Gy.cm  13ml Dye    AORTOGRAPHY N/A 5/13/2021    Procedure: AORTOGRAM;  Surgeon: HOMERO Hayden II, MD;  Location: [address];  Service: Vascular;  Laterality: N/A;    AV FISTULA PLACEMENT Right 5/28/2018    Procedure: CREATION -FISTULA-AV;  Surgeon: RICK Pollard III, MD;  Location: [address];  Service: Peripheral Vascular;  Laterality: Right;    BREAST BIOPSY  1/2012    left breast invasive mammary carcinoma (lateral bx) and intraductal papilloma (medial bx)    CARDIOVERSION  11/22/2021    CARDIOVERSION  11/22/2021    Procedure: Cardioversion;  Surgeon: Ad Garcia MD;  Location: [facility] CATH LAB;  Service: Cardiology;;    CATARACT EXTRACTION W/  INTRAOCULAR LENS IMPLANT Right 1/24/2019        CATARACT EXTRACTION W/  INTRAOCULAR LENS IMPLANT Left 1/31/2019    Procedure: EXTRACTION, CATARACT, WITH IOL INSERTION;  Surgeon: Immanuel Moncada MD;  Location: [facility];  Service: Ophthalmology;  Laterality: Left;    CHOLECYSTECTOMY  2001    CORONARY ANGIOGRAPHY N/A 2/8/2022    Procedure: ANGIOGRAM, CORONARY ARTERY;  Surgeon: Abelardo Betancur MD;  Location: [facility] CATH LAB;  Service: Cardiology;  Laterality: N/A;    DEBRIDEMENT OF FOOT Right 2/17/2021    Procedure: DEBRIDEMENT, FOOT right plantar ulcer;  Surgeon: Sonja Corral DPM;  Location: [facility];  Service: Podiatry;  Laterality: Right;    ERCP W/ SPHICTEROTOMY      FINE NEEDLE ASPIRATION  1999     Right breast    FOOT AMPUTATION Left 6/1/2021    Procedure: Transmetatarsal amputation;  Surgeon: Billy Robles DPM;  [facility];  Service: Podiatry;  Laterality: Left;    FOOT AMPUTATION Left 7/23/2021    Procedure: AMPUTATION, FOOT;  Surgeon: Billy Robles DPM;  [facility];  Service: Podiatry;  Laterality: Left;    FOOT AMPUTATION THROUGH METATARSAL Right 10/15/2020    Procedure: PARTIAL RAY AMPUTATION GREAT TOE;  Surgeon: Billy Robles DPM;  [facility];  Service: Podiatry;  Laterality: Right;  Stretcher OK    HERNIA REPAIR      LAPAROSCOPIC NEPHRECTOMY Left 2011    MASTECTOMY  2013    left    OOPHORECTOMY Left 2001    REVISION OF ARTERIOVENOUS FISTULA Right 8/15/2018    Procedure: REVISION, AV FISTULA;  Surgeon: RICK Pollard III, MD;  Location: [address];  Service: Peripheral Vascular;  Laterality: Right;    SIGMOIDECTOMY  2001    SURGICAL REMOVAL OF METATARSAL HEAD Right 2/17/2021    Procedure: OSTECTOMY, METATARSAL BONE, diatal 1st;  Surgeon: Sonja Corral DPM;  Location: [facility];  Service: Podiatry;  Laterality: Right;    TOE AMPUTATION Left 5/13/2021    Procedure: AMPUTATION, TOE;  Surgeon: HOMERO Hayden II, MD;  Location: [address];  Service: Vascular;  Laterality: Left;    TOTAL REDUCTION MAMMOPLASTY Right 2013       Review of patient's allergies indicates:   Allergen Reactions    Cyclobenzaprine Hallucinations    Erythromycin      Stomach upset    Keflex [cephalexin]      Yeast infection    Erythromycin base      Other reaction(s): upset stomach     Current Facility-Administered Medications   Medication Frequency    0.9%  NaCl infusion Once    dextrose 10% bolus 250 mL PRN    heparin (porcine) injection 1,000 Units PRN    sodium chloride 0.9% bolus 250 mL PRN     Current Outpatient Medications   Medication    acetaminophen-codeine 300-30mg (TYLENOL #3) 300-30 mg Tab    albuterol (PROVENTIL/VENTOLIN HFA) 90  mcg/actuation inhaler    amiodarone (PACERONE) 200 MG Tab    apixaban (ELIQUIS) 2.5 mg Tab    aspirin (ECOTRIN) 81 MG EC tablet    atorvastatin (LIPITOR) 40 MG tablet    benzonatate (TESSALON) 100 MG capsule    blood sugar diagnostic Strp    blood-glucose sensor (DEXCOM G6 SENSOR) Umm    blood-glucose transmitter (DEXCOM G6 TRANSMITTER) Umm    citalopram (CELEXA) 20 MG tablet    clopidogreL (PLAVIX) 75 mg tablet    famotidine (PEPCID) 20 MG tablet    fish oil-omega-3 fatty acids 300-1,000 mg capsule    flash glucose scanning reader (FREESTYLE LULU 14 DAY READER) Misc    flash glucose scanning reader (FREESTYLE LULU 2 READER) Misc    flash glucose sensor (FREESTYLE LULU 14 DAY SENSOR) Kit    flash glucose sensor (FREESTYLE LULU 2 SENSOR) Kit    gabapentin (NEURONTIN) 300 MG capsule    guaiFENesin (MUCINEX) 600 mg 12 hr tablet    insulin aspart U-100 (NOVOLOG) 100 unit/mL (3 mL) InPn pen    insulin detemir U-100 (LEVEMIR FLEXTOUCH) 100 unit/mL (3 mL) SubQ InPn pen    lancets (ONETOUCH DELICA LANCETS) 33 gauge Misc    letrozole (FEMARA) 2.5 mg Tab    methocarbamoL (ROBAXIN) 500 MG Tab    metoprolol tartrate (LOPRESSOR) 25 MG tablet    nitroGLYCERIN (NITROSTAT) 0.3 MG SL tablet    senna-docusate 8.6-50 mg (PERICOLACE) 8.6-50 mg per tablet    sevelamer carbonate (RENVELA) 800 mg Tab     Family History     Problem Relation (Age of Onset)    Arthritis Mother    Breast cancer Maternal Grandmother    Cancer Maternal Grandmother, Maternal Aunt    Celiac disease Sister    Diabetes Father    Heart disease Mother, Father, Maternal Grandmother        Tobacco Use    Smoking status: Current Some Day Smoker     Packs/day: 0.50     Years: 28.00     Pack years: 14.00     Types: Cigarettes     Start date: 1990     Last attempt to quit: 2018     Years since quittin.1    Smokeless tobacco: Never Used    Tobacco comment: anxious   Substance and Sexual Activity    Alcohol use: No    Drug  use: No    Sexual activity: Not Currently     Partners: Male     Review of Systems as per HPI  Objective:     Vital Signs (Most Recent):  Temp: 98 °F (36.7 °C) (02/17/22 1700)  Pulse: 72 (02/17/22 1700)  Resp: 20 (02/17/22 1700)  BP: (!) 196/83 (02/17/22 1700)  SpO2: 95 % (02/17/22 1700)  O2 Device (Oxygen Therapy): nasal cannula (02/17/22 1700) Vital Signs (24h Range):  Temp:  [97.6 °F (36.4 °C)-98 °F (36.7 °C)] 98 °F (36.7 °C)  Pulse:  [72-76] 72  Resp:  [20-25] 20  SpO2:  [93 %-100 %] 95 %  BP: (164-196)/(73-83) 196/83     Weight: 102.5 kg (226 lb) (02/17/22 1315)  Body mass index is 38.79 kg/m².  Body surface area is 2.15 meters squared.    No intake/output data recorded.    Physical Exam  Vitals and nursing note reviewed.   Constitutional:       General: She is not in acute distress.     Appearance: Normal appearance. She is ill-appearing. She is not toxic-appearing or diaphoretic.   Cardiovascular:      Rate and Rhythm: Normal rate and regular rhythm.      Pulses: Normal pulses.      Heart sounds: Murmur heard.   RUE fistula with thrill and     Pulmonary:      Effort: Pulmonary effort is normal. No respiratory distress.      Breath sounds: Rales present. No wheezing or rhonchi.      Comments: Decreased breath sounds  Abdominal:      General: Abdomen is flat. Bowel sounds are normal. There is no distension.      Palpations: Abdomen is soft. There is no mass.      Tenderness: There is no abdominal tenderness. There is no guarding or rebound.   Musculoskeletal:         General: Swelling and deformity present. No tenderness.      Right lower leg: No edema.      Left lower leg: No edema.      Comments: L foot amputation   Skin:     General: Skin is warm.      Capillary Refill: Capillary refill takes 2 to 3 seconds.      Coloration: Skin is not jaundiced or pale.      Findings: Lesion and rash present. No bruising or erythema.      Comments: Venous congestion maximilian LEs, no open wounds noted   Neurological:       Mental Status: She is alert.      Comments: Oriented to self  Sleeping most of the time during interview          Significant Labs:  CBC:   Recent Labs   Lab 02/17/22  1503 02/17/22  1510 02/17/22  1555   WBC 8.74  --   --    RBC 3.44*  --   --    HGB 9.5*  --   --    HCT 32.2*   < > 30*     --   --    MCV 94  --   --    MCH 27.6  --   --    MCHC 29.5*  --   --     < > = values in this interval not displayed.     CMP:   Recent Labs   Lab 02/17/22  1503   *   CALCIUM 9.1   ALBUMIN 3.4*   PROT 7.0      K 7.0*   CO2 19*   CL 97   *   CREATININE 13.8*   ALKPHOS 89   ALT 8*   AST 14   BILITOT 0.8     Microbiology Results (last 7 days)     ** No results found for the last 168 hours. **        PTH: No results for input(s): PTH in the last 168 hours.  TSH: No results for input(s): TSH in the last 168 hours.  All labs within the past 24 hours have been reviewed.    Significant Imaging:  Labs: Reviewed  ECG: Reviewed  X-Ray: Reviewed    Assessment/Plan:     History of falling  Please consult /case management as it appears pt is not able to care for herself and coordinate her dialysis care as well (when she's not feeling well)     Atrial fibrillation  -Plan per primary team       Hyperkalemia  Lab Results   Component Value Date    K 7.0 (HH) 02/17/2022     See ESRD    End-stage renal disease on hemodialysis  ESKD 2/2 DM, HT, and L hydronephrosis   Outpatient HD Information:  -Dialysis modality: Hemodialysis  -Outpatient HD unit: [facility]  -Nephrologist: Dr. Kong   -HD TX days: Tuesday/Thursday/Saturday, duration of treatment: 4 hours   -Last HD TX prior to hospital admission: 02/10/2022  -Dialysis access: RUE AV fistula  -Residual urine: Anuric   -EDW: 100 Kg       Plan/Recommendations:     -iHD today (02/17/2022) with UF -3L as BP tolerates for metabolic clearance and volume management   -Electrolytes: hyperkalemia 7, should improve with HD, repeat labs in AM   -Mineral  & Bone Disorder:   Check Phosphorus and PTH  -ESRD anemia: hgb 9.5, goal hgb 10-11g/dL, check iron studies, transfuse if hgb <7  -Daily renal function panel  -Renal diet, if not NPO   -Strict I/O's and daily weights    Type II diabetes mellitus  Lab Results   Component Value Date    HGBA1C 5.9 (H) 11/03/2021     -Plan per primary team         Hypertension  -please perform med rec with pt's caregiver, pt unable to discuss her home meds  -would start her on her some of her antihypertensive medication and anticoagulation if there's no contraindications        Thank you for your consult. I will follow-up with patient. Please contact us if you have any additional questions.    Jeri Vasquez MD  Nephrology  [facility] - Emergency Dept

## 2022-02-18 NOTE — ASSESSMENT & PLAN NOTE
Patient gets HD TTh S. Presented to ED after missing HD (unclear how long)    - Nephro consulted, HD initiated in ED  - RFP daily  - Avoid nephrotoxins, renally dose meds  - renal diet after she awakes more, NPO for now.

## 2022-02-18 NOTE — PROGRESS NOTES
[facility] - Telemetry Stepdown  Wound Care    Patient Name:  Kylie King   MRN:  [id number]  Date: 2022  Diagnosis: <principal problem not specified>    History:     Past Medical History:   Diagnosis Date    Anxiety     Breast cancer     left    Carotid artery disease     Cataract     Choledocholithiasis     s/p ERCP and stent placement    Chronic diastolic CHF (congestive heart failure)     Chronic obstructive pulmonary disease     Chronic venous insufficiency     Depression     End-stage renal disease on hemodialysis     M/W/F    GERD (gastroesophageal reflux disease)     History of breast cancer     History of colon cancer     s/p sigmoid colectomy    History of kidney stones     History of osteomyelitis of left foot     History of pancreatitis     History of stroke     Hyperlipidemia     Hypertension     Intracerebral hemorrhage     Lumbar degenerative disc disease     Lumbar spinal stenosis     Morbid obesity     Paroxysmal atrial fibrillation     Peripheral artery disease     Peripheral neuropathy     Tobacco dependence     Type II diabetes mellitus     Urinary incontinence        Social History     Socioeconomic History    Marital status: Single    Number of children: 2   Occupational History    Occupation: not working     Employer: [employer]   Tobacco Use    Smoking status: Current Some Day Smoker     Packs/day: 0.50     Years: 28.00     Pack years: 14.00     Types: Cigarettes     Start date: 1990     Last attempt to quit: 2018     Years since quittin.1    Smokeless tobacco: Never Used    Tobacco comment: anxious   Substance and Sexual Activity    Alcohol use: No    Drug use: No    Sexual activity: Not Currently     Partners: Male   Social History Narrative    She is not exercising regularly     Social Determinants of Health     Financial Resource Strain: Low Risk     Difficulty of Paying Living Expenses: Not hard at all   Food Insecurity: No Food Insecurity    Worried About  Running Out of Food in the Last Year: Never true    Ran Out of Food in the Last Year: Never true   Transportation Needs: No Transportation Needs    Lack of Transportation (Medical): No    Lack of Transportation (Non-Medical): No   Physical Activity: Insufficiently Active    Days of Exercise per Week: 3 days    Minutes of Exercise per Session: 20 min   Stress: No Stress Concern Present    Feeling of Stress : Only a little   Social Connections: Moderately Isolated    Frequency of Communication with Friends and Family: More than three times a week    Frequency of Social Gatherings with Friends and Family: Twice a week    Attends [employer] Services: Never    Active Member of Clubs or Organizations: No    Attends Club or Organization Meetings: Never    Marital Status:    Housing Stability: Low Risk     Unable to Pay for Housing in the Last Year: No    Number of Places Lived in the Last Year: 1    Unstable Housing in the Last Year: No       Precautions:     Allergies as of 02/17/2022 - Reviewed 02/17/2022   Allergen Reaction Noted    Cyclobenzaprine Hallucinations 02/26/2013    Erythromycin  01/08/2013    Keflex [cephalexin]  01/02/2014    Erythromycin base  04/06/2021       WOC Assessment Details/Treatment   Wound care consulted for buttocks, perineum, buttocks per RN  The abdominal pannus is moist/red/ intertrigo to skin folds- MASD  The buttocks/perineum is red/painful/intact- blanches. -IAD    Plan  Abdominal pannus- miconazole ointment BID to clean/dry skin  Buttocks- perineum- triad ointment to buttocks skin BID/prn cleansing    Nursing to continue care, pressure prevention measures  Wound care will follow- up prn as needed.   Recommendations made to primary team  per secure chat for above plans . Orders placed.      02/18/22 0940        Altered Skin Integrity 02/18/22 1150 medial Buttocks Incontinence associated dermatitis   Date First Assessed/Time First Assessed: 02/18/22 1150   Altered Skin Integrity  Present on Admission: yes  Orientation: medial  Location: Buttocks  Primary Wound Type: Incontinence associated dermatitis   Wound Image    Dressing Appearance Open to air   Drainage Amount None   Appearance Red;Moist   Tissue loss description Not applicable   Periwound Area Moist   Care Cleansed with:;Soap and water;Applied:;Skin Barrier        Altered Skin Integrity 02/18/22 0940 anterior Pelvis Moisture associated dermatitis   Date First Assessed/Time First Assessed: 02/18/22 0940   Altered Skin Integrity Present on Admission: yes  Orientation: anterior  Location: Pelvis  Primary Wound Type: Moisture associated dermatitis   Wound Image    Dressing Appearance Open to air   Drainage Amount None   Appearance Pink;Red;Intact;Moist   Tissue loss description Partial thickness   Periwound Area Intact;Redness;Satellite lesion;Moist   Wound Edges Open   Care Cleansed with:;Soap and water;Applied:;Skin Barrier  (miconazole ointment)       02/18/2022

## 2022-02-18 NOTE — PROGRESS NOTES
02/17/22 2100   Post-Hemodialysis Assessment   Rinseback Volume (mL) 250 mL   Blood Volume Processed (Liters) 66.8 L   Dialyzer Clearance Lightly streaked   Duration of Treatment (minutes) 180 minutes   Hemodialysis Intake (mL) 500 mL   Total UF (mL) 3000 mL   Net Fluid Removal 2500   Patient Response to Treatment tolerated well   Post-Hemodialysis Comments stable   HD completed and report given to primary nurse.

## 2022-02-18 NOTE — ASSESSMENT & PLAN NOTE
Troponin elevated to .401 at admission, likely secondary to fluid overload in a patient who missed HD. No ST elevations or depressions on EKG changes.     - Repeat troponin decreased. No longer trending

## 2022-02-18 NOTE — PLAN OF CARE
CMICU DAILY GOALS       A: Awake    RASS: Goal -    Actual -     Restraint necessity:    B: Breathe   SBT: Not intubated   C: Coordinate A & B, analgesics/sedatives   Pain: managed    SAT: Not intubated  D: Delirium   CAM-ICU: Overall CAM-ICU: Negative  E: Early(intubated/ Progressive (non-intubated) Mobility   MOVE Screen: Pass   Activity: Activity Management: (P) Arm raise - L1  FAS: Feeding/Nutrition   Diet order: Diet/Nutrition Received: NPO,    T: Thrombus   DVT prophylaxis: VTE Required Core Measure: Pharmacological prophylaxis initiated/maintained  H: HOB Elevation   Head of Bed (HOB) Positioning: (P) HOB at 30-45 degrees  U: Ulcer Prophylaxis   GI: no  G: Glucose control   managed Glycemic Management: blood glucose monitored  S: Skin   Bathing/Skin Care: linen changed,incontinence care  Device Skin Pressure Protection: (P) absorbent pad utilized/changed  Pressure Reduction Devices: (P) foam padding utilized  Pressure Reduction Techniques: (P) pressure points protected  Skin Protection: (P) adhesive use limited  B: Bowel Function   Last BM unknown    I: Indwelling Catheters   Mace necessity:     CVC necessity: No  D: De-escalation Antibiotics   No    Family/Goals of care/Code Status   Code Status: Prior    24H Vital Sign Range  Temp:  [97.6 °F (36.4 °C)-98.1 °F (36.7 °C)]   Pulse:  []   Resp:  [15-25]   BP: (126-196)/(51-83)   SpO2:  [92 %-100 %]      Shift Events   No acute events throughout shift    VS and assessment per flow sheet, patient progressing towards goals as tolerated, plan of care reviewed with  charge nurse , all concerns addressed, will continue to monitor.    Osmar Villareal

## 2022-02-18 NOTE — ASSESSMENT & PLAN NOTE
Follows with Dr. Ayaan CASAREZ    - continue amiodarone  - continue lopressor  - continue eliquis ([id number] of 7)  Maintain K>4, Mg>2

## 2022-02-18 NOTE — ASSESSMENT & PLAN NOTE
Patient with hx of ESRD on dialysis, history of admissions post missing dialysis where she becomes somnolent and improves after HD initiated. CTH without acute intracranial abnormalities making stroke or bleed less likely, no white count and afebrile making infection less likely. Most likely metabolic given missed HD and and elevated BUN greater than 100.     - HD initiated in ED.   - Daily metabolic panel  - phos, Mg daily

## 2022-02-18 NOTE — ASSESSMENT & PLAN NOTE
Seen on CT abdomen pelvis. Has history of frequent falls    - PT/OT   0 = understands/communicates without difficulty

## 2022-02-18 NOTE — ASSESSMENT & PLAN NOTE
Follows with Dr. Ayaan CASAREZ    - continue amiodarone  - continue lopressor  - continue eliquis ([id number] of 7)  Maintain K>4, Mg>2

## 2022-02-18 NOTE — PLAN OF CARE
Problem: Occupational Therapy Goal  Goal: Occupational Therapy Goal  Description: Goals to be met by: 7 days 2/25/22     Patient will increase functional independence with ADLs by performing:    Pt to complete UE dressing with set-up  Pt to complete LE dressing with RONA    Pt to complete toileting with SBA  Pt to complete g/h skills in standing with SBA  Pt to complete t/f to bed, chair and commode with SBA  Outcome: Ongoing, Progressing

## 2022-02-18 NOTE — H&P
[facility] - Emergency Dept  Critical Care Medicine  History & Physical    Patient Name: Kylie King  MRN: [id number]  Admission Date: 2/17/2022  Hospital Length of Stay: 0 days  Code Status: Prior  Attending Physician: Terra Denis MD   Primary Care Provider: Virginia Foote MD   Principal Problem: <principal problem not specified>    Subjective:     HPI:  Kylie Kign is a 66 y.o. year old female past medical history of CAD s/p PCI on 02/08/2022 (on DAPT), left breast cancer s/p mastectomy, chronic respiratory failure (on home 2L of O2), COPD, ESRD on HD TTS w/ anemia of chronic disease, Atrial Fib (failed DCCV in Nov 2021); on eliquis and amiodarone, CVA, PVD (s/p left foot amputation on Plavix), HTN, T2DM, HLD, who presented to the ED for generalized weakness and multiple falls. Patient was somnolent upon our interview. History was obtained from chart review. She reportedly last received HD due to her debility. Her last HD was about a week ago. Per ED nurse, patient was conversational upon arrival but suddenly became somnolent after about an hour, which is somewhat similar to her presentation during her most recent hospital admission.   Of note, the patient was admitted to hospital medicine on 02/05 for hyperkalemia and hypervolemia in the setting of missed HD, NSTEMI for which she underwent LHC on 02/07 and 02/08 with successful treatment of critical ostial left circumflex disease by doing V stenting of the ostial left main and left circumflex on 02/08. She was discharged with aspirin, plavix and to continue eliquis for Atrial fibrillation with f/u with cardiology. The patient refused HD the day prior to hospital discharge on 12/08.      Upon arrival, she was hemodynamically stable, however was hypertensive. Labs revealed potassium of 7.0. . ABG with pH of 7.3, Co2 49, BNP 4000s. ECG without hyperkalemic changes but revealed atrial fibrillation. CXR appeared grossly same from prior, though  not an optimal study due to rotation. CT Head was unremarkable. CT A/P revealed probable subacute fracture of the right inferior pubic ramus. She was shifted for hyperkalemia and received calcium gluc.  Nephrology was consulted and she was initiated on HD. Patient was admitted to MICU for further management of hyperkalemia and encephalopathy.       Hospital/ICU Course:  No notes on file     Past Medical History:   Diagnosis Date    Anxiety     Breast cancer     left    Carotid artery disease     Cataract     Choledocholithiasis     s/p ERCP and stent placement    Chronic diastolic CHF (congestive heart failure)     Chronic obstructive pulmonary disease     Chronic venous insufficiency     Depression     End-stage renal disease on hemodialysis     M/W/F    GERD (gastroesophageal reflux disease)     History of breast cancer     History of colon cancer 2001    s/p sigmoid colectomy    History of kidney stones     History of osteomyelitis of left foot     History of pancreatitis     History of stroke     Hyperlipidemia     Hypertension     Intracerebral hemorrhage     Lumbar degenerative disc disease     Lumbar spinal stenosis     Morbid obesity     Paroxysmal atrial fibrillation     Peripheral artery disease     Peripheral neuropathy     Tobacco dependence     Type II diabetes mellitus     Urinary incontinence        Past Surgical History:   Procedure Laterality Date    ANGIOGRAM, CORONARY, WITH LEFT HEART CATHETERIZATION N/A 2/7/2022    Procedure: Angiogram, Coronary, with Left Heart Cath;  Surgeon: Adam Rutherford MD;  Location: [facility] CATH LAB;  Service: Cardiology;  Laterality: N/A;    ANGIOGRAPHY OF LOWER EXTREMITY Right 9/17/2020    Procedure: Angiogram Extremity Unilateral;  Surgeon: RICK Pollard III, MD;  Location: [facility] OR [address] [place] [address];  Service: Peripheral Vascular;  Laterality: Right;  6.2 minutes of fluro  690.88 mGy  112.64 Gycm2  55 ml    ANGIOGRAPHY OF LOWER EXTREMITY Left  5/13/2021    Procedure: Angiogram Extremity Unilateral;  Surgeon: HOMERO Hayden II, MD;  Location: [facility] OR [facility] FLR;  Service: Vascular;  Laterality: Left;  35.1 min  971.41 mGy  190.27 Gy.cm  101ml Dye    ANGIOGRAPHY OF LOWER EXTREMITY Right 7/19/2021    Procedure: Angiogram Extremity Unilateral;  Surgeon: HOMERO Hayden II, MD;  [facility];  Service: Vascular;  Laterality: Right;  3.0 min  121.00 mGy  26.8713 Gy.cm  13ml Dye    AORTOGRAPHY N/A 5/13/2021    Procedure: AORTOGRAM;  Surgeon: HOMERO Hayden II, MD;  [facility];  Service: Vascular;  Laterality: N/A;    AV FISTULA PLACEMENT Right 5/28/2018    Procedure: CREATION -FISTULA-AV;  Surgeon: RICK Pollard III, MD;  [facility];  Service: Peripheral Vascular;  Laterality: Right;    BREAST BIOPSY  1/2012    left breast invasive mammary carcinoma (lateral bx) and intraductal papilloma (medial bx)    CARDIOVERSION  11/22/2021    CARDIOVERSION  11/22/2021    Procedure: Cardioversion;  Surgeon: Ad Garcia MD;  Location: [facility] CATH LAB;  Service: Cardiology;;    CATARACT EXTRACTION W/  INTRAOCULAR LENS IMPLANT Right 1/24/2019        CATARACT EXTRACTION W/  INTRAOCULAR LENS IMPLANT Left 1/31/2019    Procedure: EXTRACTION, CATARACT, WITH IOL INSERTION;  Surgeon: Immanuel Moncada MD;  Location: [facility];  Service: Ophthalmology;  Laterality: Left;    CHOLECYSTECTOMY  2001    CORONARY ANGIOGRAPHY N/A 2/8/2022    Procedure: ANGIOGRAM, CORONARY ARTERY;  Surgeon: Abelardo Betancur MD;  Location: [facility] CATH LAB;  Service: Cardiology;  Laterality: N/A;    DEBRIDEMENT OF FOOT Right 2/17/2021    Procedure: DEBRIDEMENT, FOOT right plantar ulcer;  Surgeon: Sonja Corral DPM;  Location: [facility] OR;  Service: Podiatry;  Laterality: Right;    ERCP W/ SPHICTEROTOMY      FINE NEEDLE ASPIRATION  1999    Right breast    FOOT AMPUTATION Left 6/1/2021    Procedure: Transmetatarsal amputation;  Surgeon: Billy Robles,  PRINCE;  Location: [facility] OR [facility] FLR;  Service: Podiatry;  Laterality: Left;    FOOT AMPUTATION Left 2021    Procedure: AMPUTATION, FOOT;  Surgeon: Billy Robles DPM;  [facility];  Service: Podiatry;  Laterality: Left;    FOOT AMPUTATION THROUGH METATARSAL Right 10/15/2020    Procedure: PARTIAL RAY AMPUTATION GREAT TOE;  Surgeon: Billy Robles DPM;  [facility];  Service: Podiatry;  Laterality: Right;  Stretcher OK    HERNIA REPAIR      LAPAROSCOPIC NEPHRECTOMY Left     MASTECTOMY  2013    left    OOPHORECTOMY Left     REVISION OF ARTERIOVENOUS FISTULA Right 8/15/2018    Procedure: REVISION, AV FISTULA;  Surgeon: RICK Pollard III, MD;  Location: [facility] OR [address] [place] [address];  Service: Peripheral Vascular;  Laterality: Right;    SIGMOIDECTOMY      SURGICAL REMOVAL OF METATARSAL HEAD Right 2021    Procedure: OSTECTOMY, METATARSAL BONE, diatal 1st;  Surgeon: Sonja Corral DPM;  Location: [facility];  Service: Podiatry;  Laterality: Right;    TOE AMPUTATION Left 2021    Procedure: AMPUTATION, TOE;  Surgeon: HOMERO Hayden II, MD;  Location: [facility] OR [address] [place] [address];  Service: Vascular;  Laterality: Left;    TOTAL REDUCTION MAMMOPLASTY Right        Review of patient's allergies indicates:   Allergen Reactions    Cyclobenzaprine Hallucinations    Erythromycin      Stomach upset    Keflex [cephalexin]      Yeast infection    Erythromycin base      Other reaction(s): upset stomach       Family History     Problem Relation (Age of Onset)    Arthritis Mother    Breast cancer Maternal Grandmother    Cancer Maternal Grandmother, Maternal Aunt    Celiac disease Sister    Diabetes Father    Heart disease Mother, Father, Maternal Grandmother        Tobacco Use    Smoking status: Current Some Day Smoker     Packs/day: 0.50     Years: 28.00     Pack years: 14.00     Types: Cigarettes     Start date: 1990     Last attempt to quit: 2018     Years since quittin.1     Smokeless tobacco: Never Used    Tobacco comment: anxious   Substance and Sexual Activity    Alcohol use: No    Drug use: No    Sexual activity: Not Currently     Partners: Male      Review of Systems   Unable to perform ROS: Acuity of condition     Objective:     Vital Signs (Most Recent):  Temp: 98 °F (36.7 °C) (02/17/22 1700)  Pulse: 77 (02/17/22 1740)  Resp: 20 (02/17/22 1700)  BP: (!) 177/76 (02/17/22 1740)  SpO2: (!) 92 % (02/17/22 1740) Vital Signs (24h Range):  Temp:  [97.6 °F (36.4 °C)-98 °F (36.7 °C)] 98 °F (36.7 °C)  Pulse:  [72-77] 77  Resp:  [20-25] 20  SpO2:  [92 %-100 %] 92 %  BP: (164-196)/(73-83) 177/76   Weight: 102.5 kg (226 lb)  Body mass index is 38.79 kg/m².    No intake or output data in the 24 hours ending 02/17/22 1742    Physical Exam  Vitals and nursing note reviewed.   Constitutional:       General: She is not in acute distress.     Appearance: She is obese. She is ill-appearing. She is not diaphoretic.      Comments: Somnolent but arousable with tactile stimuli   HENT:      Head: Normocephalic and atraumatic.      Nose: Nose normal.   Eyes:      General: No scleral icterus.  Cardiovascular:      Rate and Rhythm: Normal rate and regular rhythm.      Heart sounds: No murmur heard.      Pulmonary:      Effort: No respiratory distress.      Breath sounds: No wheezing.      Comments: Lung sounds difficult to characterize given habitus  Abdominal:      General: There is distension.      Palpations: Abdomen is soft.      Tenderness: There is no abdominal tenderness.   Musculoskeletal:         General: Deformity (Left foot amputation at midfoot, right big toe amputation) present. No signs of injury.      Right lower leg: No edema.      Left lower leg: No edema.      Comments: Area of upper medial breast that was hard, abnormal contour of breast, and scarring present   Skin:     Capillary Refill: Capillary refill takes less than 2 seconds.      Coloration: Skin is not jaundiced.       Findings: Bruising and erythema (Lower extremities brawny bilaterally to mid lower leg) present.   Neurological:      Mental Status: She is disoriented.      Comments: Somnolent, arousable with tactile stimulus and was able to state name, but unsure where she was or when her last dialysis appointment was         Vents:     Lines/Drains/Airways     Drain                 Hemodialysis AV Fistula Right forearm -- days         Hemodialysis AV Fistula 05/28/18 1436  1361 days          Airway                 Airway - Non-Surgical 11/22/21 1308 Nasal Cannula 87 days          Peripheral Intravenous Line                 Peripheral IV - Single Lumen 02/17/22 1526 20 G Left Antecubital <1 day              Significant Labs:    CBC/Anemia Profile:  Recent Labs   Lab 02/17/22  1503 02/17/22  1510 02/17/22  1555   WBC 8.74  --   --    HGB 9.5*  --   --    HCT 32.2* 29* 30*     --   --    MCV 94  --   --    RDW 20.4*  --   --         Chemistries:  Recent Labs   Lab 02/17/22  1503      K 7.0*   CL 97   CO2 19*   *   CREATININE 13.8*   CALCIUM 9.1   ALBUMIN 3.4*   PROT 7.0   BILITOT 0.8   ALKPHOS 89   ALT 8*   AST 14       ABGs:   Recent Labs   Lab 02/17/22  1633   PH 7.303*   PCO2 49.1*   HCO3 24.3   POCSATURATED 41*   BE -2     Troponin:   Recent Labs   Lab 02/17/22  1503   TROPONINI 0.401*       Significant Imaging: I have reviewed all pertinent imaging results/findings within the past 24 hours.    Assessment/Plan:     Neuro  Encephalopathy, metabolic  Patient with hx of ESRD on dialysis, history of admissions post missing dialysis where she becomes somnolent and improves after HD initiated. CTH without acute intracranial abnormalities making stroke or bleed less likely, no white count and afebrile making infection less likely. Most likely metabolic given missed HD and and elevated BUN greater than 100.     - HD initiated in ED.   - Daily metabolic panel  - phos, Mg daily      Pulmonary  COPD (chronic  obstructive pulmonary disease)  On home 2L O2, was on 2 L NC in ED    - maintain sats 88-92  - Supplemental O2 as needed    Cardiac/Vascular  History of non-ST elevation myocardial infarction (NSTEMI)  Presented 2/5 to ED had initial troponin of 3 then peaked at 7.4. TTE at that time with acute drop in EF from 60-40% with concern for ACS, left heart cath done, LCx with 70% occlusion and drug eluting stent placed 2/8 in Lcx and LAD.     -Plan was for DAPT x 1 month, then plavix alone (3/8) given that she is also on eliquis  - Continue metoprolol, statin    Atrial fibrillation  Follows with Dr. Ayaan CASAREZ    - continue amiodarone  - continue lopressor  - continue eliquis ([id number] of 7)  Maintain K>4, Mg>2    Peripheral artery disease  -Continue aspirin, clopidogrel, and statin    Acute on chronic diastolic heart failure  Missed HD, BNP 4400 at admit. CXR with pulm edema.     TTE 02/05/2022  · Severe left atrial enlargement.  · The left ventricle is normal in size with mildly decreased systolic function.  · The estimated ejection fraction is 40%.  · There are segmental left ventricular wall motion abnormalities: apex, apical septum, mid and apical anterolateral, apical inferior and apical anterior wall.  · Grade II left ventricular diastolic dysfunction.  · Normal right ventricular size with normal right ventricular systolic function.  · There is mild aortic valve stenosis.  · Aortic valve area is 1.78 cm2; peak velocity is 0.71 m/s; mean gradient is 6 mmHg.  · Intermediate central venous pressure (8 mmHg).  · The estimated PA systolic pressure is 19 mmHg.     Plan   - Fluid removal with HD  - Low salt diet and renal diet when more awake. NPO for now  - Fluid restriction of 1500ml daily  - Continue GDMT     Elevated troponin  Troponin elevated to .401 at admission, likely secondary to fluid overload in a patient who missed HD. No ST elevations or depressions on EKG changes.     - f/u with repeat  troponin    Hypertension  Takes home lopressor 25 BID. In the past has taken amlodipine 10mg, coreg 12.5mg BID, hydralazine 50mg TID    - HD per nephrology, currently receiving dialysis. Missed recent dialysis sessions.     Renal/  Dependence on renal dialysis  See ESRD on dialysis    Hyperkalemia  At presentation k was 7. No EKG changes, given calcium gluconate, shifted, and HD initiated.     - Trend RFP daily    End-stage renal disease on hemodialysis  Patient gets HD TTh S. Presented to ED after missing HD (unclear how long)    - Nephro consulted, HD initiated in ED  - RFP daily  - Avoid nephrotoxins, renally dose meds  - renal diet after she awakes more, NPO for now.     Endocrine  Type II diabetes mellitus  Last a1c 5.9, BG on admission 130.     - POCT  - Diabetic diet when more awake, NPO for now  - SSI for NPO  - At home takes levemir 4 units BID, and aspart 3 TID wm. Will hold for now.     Orthopedic  Pubic ramus fracture  Seen on CT abdomen pelvis. Has history of frequent falls    - PT/OT    Other  Debility  PT/OT      Critical Care Daily Checklist:    A: Awake: RASS Goal/Actual Goal:    Actual:     B: Spontaneous Breathing Trial Performed?     C: SAT & SBT Coordinated?                        D: Delirium: CAM-ICU     E: Early Mobility Performed? No   F: Feeding Goal:    Status:     Current Diet Order   Procedures    Diet NPO      AS: Analgesia/Sedation None   T: Thromboembolic Prophylaxis eliquis   H: HOB > 300 Yes   U: Stress Ulcer Prophylaxis (if needed)    G: Glucose Control SSI   B: Bowel Function     I: Indwelling Catheter (Lines & Mace) Necessity    D: De-escalation of Antimicrobials/Pharmacotherapies None    Plan for the day/ETD HD, admit to MICU    Code Status:  Family/Goals of Care: Prior         Critical secondary to Patient has an abrupt change in neurologic status: Metabolic encephalopathy     Critical care was time spent personally by me on the following activities: development of treatment  plan with patient or surrogate and bedside caregivers, discussions with consultants, evaluation of patient's response to treatment, examination of patient, ordering and performing treatments and interventions, ordering and review of laboratory studies, ordering and review of radiographic studies, pulse oximetry, re-evaluation of patient's condition. This critical care time did not overlap with that of any other provider or involve time for any procedures.     Fracisco Paula, DO  Critical Care Medicine  [facility] - Emergency Dept

## 2022-02-18 NOTE — PLAN OF CARE
Patient stepped down to [id number] before CM could obtain discharge planning assessment. [facility] CM team will take over patient's care.      Mehreen Sweet RN     [phone]

## 2022-02-18 NOTE — PT/OT/SLP EVAL
Physical Therapy Evaluation    Patient Name:  Kylie King   MRN:  [id number]  Admit Date: 2/17/2022  Admitting Diagnosis:  <principal problem not specified>  Length of Stay: 1 days  Recent Surgery: * No surgery found *      Recommendations:     Discharge Recommendations:  home health PT,other (see comments) (+ 24/7 assistance)   Discharge Equipment Recommendations: other (see comments) (tbd)   Barriers to discharge: Inaccessible home    Assessment:     Kylie King is a 66 y.o. female admitted with a medical diagnosis of <principal problem not specified>.  Pt found alert and medically stable per RN. Pt refused functional mobility even after multiple attempts. Pt educated on importance of functional mobility to prevent deconditioning. Pt's ROM/MMT was assessed in bed. POC next session will be to perform functional mobility as tolerated.    Problem List: weakness,gait instability,decreased lower extremity function,impaired endurance,impaired balance,impaired sensation,impaired self care skills,pain,impaired functional mobilty,decreased ROM,impaired cardiopulmonary response to activity  Rehab Prognosis: Good; patient would benefit from acute skilled PT services to address these deficits and reach maximum level of function.      Plan:     During this hospitalization, patient to be seen 3 x/week to address the identified rehab impairments via gait training,therapeutic activities,therapeutic exercises,neuromuscular re-education and progress towards the established goals.    · Plan of Care Expires:  03/17/22    Subjective   Communicated with RN prior to session.  Patient found HOB elevated upon PT entry to room, agreeable to evaluation. Kylie King's alone during session.    Chief Complaint: Fall (Has fallen repeatedly in the past couple of weeks. Dialysis pt. Has not received dialysis in 1 week. Pt. Reports feeling weak lately. Also reports right sided abdominal pain. )    Patient/Family Comments/goals:  "return home  Pain/Comfort:  · Pain Rating 1: 3/10  · Location - Side 1: Right  · Location 1: abdomen  · Pain Addressed 1: Reposition,Distraction  · Pain Rating Post-Intervention 1: 3/10  · Location - Side 2: Right  · Location 2: abdomen    Living Environment:  Patient lives with alone in a single family home with 4 MARIA LUISA and B HR. Pt reports she has 24/7 assistance from friends and family that stay with her.    Prior Level of Function:   Patient reports being modified independent with mobility & with ADLs. Pt states she is able to dress, cook and bathe herself. Patient uses DME as follows: rollator,cane, quad,oxygen prn, & shower stool    Patient reports they will have 24/7 assistance upon discharge.    Objective:   Patient found with: blood pressure cuff,pulse ox (continuous),telemetry,oxygen,peripheral IV     General Precautions: Standard, Cardiac fall   Orthopedic Precautions:N/A   Braces: N/A   Oxygen Device: Nasal Cannula   Vitals: BP (!) 165/82 (BP Location: Left leg, Patient Position: Lying)   Pulse 64   Temp 98.1 °F (36.7 °C) (Oral)   Resp (!) 33   Ht 5' 4" (1.626 m)   Wt 107.2 kg (236 lb 5.3 oz)   SpO2 96%   BMI 40.57 kg/m²     Exams:  · Cognition:   · Alert and Uncooperative  · AxOx4  · Command following: Follows multistep  commands  · Fluency: clear/fluent    · Pt reports BLE neuropathy  · BLE MMT functionally 2+/5  · PROM WFL BLE    Outcome Measures:  AM-PAC 6 CLICK MOBILITY  Turning over in bed (including adjusting bedclothes, sheets and blankets)?: 2  Sitting down on and standing up from a chair with arms (e.g., wheelchair, bedside commode, etc.): 2  Moving from lying on back to sitting on the side of the bed?: 2  Moving to and from a bed to a chair (including a wheelchair)?: 2  Need to walk in hospital room?: 1  Climbing 3-5 steps with a railing?: 1  Basic Mobility Total Score: 10     Functional Mobility:  Additional staff present: OT - multiple skilled therapists required for safety, v/s " monitoring, and line management  Bed Mobility:    Rolling/Turning to Left/Right:  o Rolling limited d/t pain in abdomen. Pt able to initiate reaching with upper body. LEs not initiated.    Patient refused all functional mobility even after several attempts.     Therapeutic Activities, Exercises, and Education:   Pt educated on importance of daily activity  Pt educated on role of PT/POC    Patient left HOB elevated with all lines intact, call button in reach and RN notified.    GOALS:   Multidisciplinary Problems     Physical Therapy Goals        Problem: Physical Therapy Goal    Goal Priority Disciplines Outcome Goal Variances Interventions   Physical Therapy Goal     PT, PT/OT Ongoing, Progressing     Description: Goals to be met by: 3/4/22     Patient will increase functional independence with mobility by performin. Supine to sit with Moderate Assistance  2. Sit to stand transfer with Moderate Assistance  3. Gait  x 50 feet with Moderate Assistance using Rolling Walker.   4. Ascend/descend 4 stair with bilateral Handrails Moderate Assistance using 4 point cane.   5. Lower extremity exercise program x15 reps per handout, with supervision                     History:     Past Medical History:   Diagnosis Date    Anxiety     Breast cancer     left    Carotid artery disease     Cataract     Choledocholithiasis     s/p ERCP and stent placement    Chronic diastolic CHF (congestive heart failure)     Chronic obstructive pulmonary disease     Chronic venous insufficiency     Depression     End-stage renal disease on hemodialysis     M/W/F    GERD (gastroesophageal reflux disease)     History of breast cancer     History of colon cancer     s/p sigmoid colectomy    History of kidney stones     History of osteomyelitis of left foot     History of pancreatitis     History of stroke     Hyperlipidemia     Hypertension     Intracerebral hemorrhage     Lumbar degenerative disc disease      Lumbar spinal stenosis     Morbid obesity     Paroxysmal atrial fibrillation     Peripheral artery disease     Peripheral neuropathy     Tobacco dependence     Type II diabetes mellitus     Urinary incontinence        Past Surgical History:   Procedure Laterality Date    ANGIOGRAM, CORONARY, WITH LEFT HEART CATHETERIZATION N/A 2/7/2022    Procedure: Angiogram, Coronary, with Left Heart Cath;  Surgeon: Adam Rutherford MD;  Location: [facility] CATH LAB;  Service: Cardiology;  Laterality: N/A;    ANGIOGRAPHY OF LOWER EXTREMITY Right 9/17/2020    Procedure: Angiogram Extremity Unilateral;  Surgeon: RICK Pollard III, MD;  Location: [facility] OR [address] [place] [address];  Service: Peripheral Vascular;  Laterality: Right;  6.2 minutes of fluro  690.88 mGy  112.64 Gycm2  55 ml    ANGIOGRAPHY OF LOWER EXTREMITY Left 5/13/2021    Procedure: Angiogram Extremity Unilateral;  Surgeon: HOMERO Hayden II, MD;  Location: [address];  Service: Vascular;  Laterality: Left;  35.1 min  971.41 mGy  190.27 Gy.cm  101ml Dye    ANGIOGRAPHY OF LOWER EXTREMITY Right 7/19/2021    Procedure: Angiogram Extremity Unilateral;  Surgeon: HOMERO Hayden II, MD;  Location: [address];  Service: Vascular;  Laterality: Right;  3.0 min  121.00 mGy  26.8713 Gy.cm  13ml Dye    AORTOGRAPHY N/A 5/13/2021    Procedure: AORTOGRAM;  Surgeon: HOMERO Hayden II, MD;  Location: [address];  Service: Vascular;  Laterality: N/A;    AV FISTULA PLACEMENT Right 5/28/2018    Procedure: CREATION -FISTULA-AV;  Surgeon: RICK Pollard III, MD;  Location: [address];  Service: Peripheral Vascular;  Laterality: Right;    BREAST BIOPSY  1/2012    left breast invasive mammary carcinoma (lateral bx) and intraductal papilloma (medial bx)    CARDIOVERSION  11/22/2021    CARDIOVERSION  11/22/2021    Procedure: Cardioversion;  Surgeon: Ad Garcia MD;  Location: [facility] CATH LAB;  Service: Cardiology;;    CATARACT EXTRACTION W/  INTRAOCULAR LENS IMPLANT Right  1/24/2019        CATARACT EXTRACTION W/  INTRAOCULAR LENS IMPLANT Left 1/31/2019    Procedure: EXTRACTION, CATARACT, WITH IOL INSERTION;  Surgeon: Immanuel Moncada MD;  Location: [facility] OR;  Service: Ophthalmology;  Laterality: Left;    CHOLECYSTECTOMY  2001    CORONARY ANGIOGRAPHY N/A 2/8/2022    Procedure: ANGIOGRAM, CORONARY ARTERY;  Surgeon: Abelardo Betancur MD;  Location: [facility] CATH LAB;  Service: Cardiology;  Laterality: N/A;    DEBRIDEMENT OF FOOT Right 2/17/2021    Procedure: DEBRIDEMENT, FOOT right plantar ulcer;  Surgeon: Sonja Corral DPM;  Location: [facility] OR;  Service: Podiatry;  Laterality: Right;    ERCP W/ SPHICTEROTOMY      FINE NEEDLE ASPIRATION  1999    Right breast    FOOT AMPUTATION Left 6/1/2021    Procedure: Transmetatarsal amputation;  Surgeon: Billy Robles DPM;  [address];  Service: Podiatry;  Laterality: Left;    FOOT AMPUTATION Left 7/23/2021    Procedure: AMPUTATION, FOOT;  Surgeon: Billy Robles DPM;  [address];  Service: Podiatry;  Laterality: Left;    FOOT AMPUTATION THROUGH METATARSAL Right 10/15/2020    Procedure: PARTIAL RAY AMPUTATION GREAT TOE;  Surgeon: Billy Robles DPM;  [facility];  Service: Podiatry;  Laterality: Right;  Stretcher OK    HERNIA REPAIR      LAPAROSCOPIC NEPHRECTOMY Left 2011    MASTECTOMY  2013    left    OOPHORECTOMY Left 2001    REVISION OF ARTERIOVENOUS FISTULA Right 8/15/2018    Procedure: REVISION, AV FISTULA;  Surgeon: RICK Pollard III, MD;  [address];  Service: Peripheral Vascular;  Laterality: Right;    SIGMOIDECTOMY  2001    SURGICAL REMOVAL OF METATARSAL HEAD Right 2/17/2021    Procedure: OSTECTOMY, METATARSAL BONE, diatal 1st;  Surgeon: Sonja Corral DPM;  Location: [facility] OR;  Service: Podiatry;  Laterality: Right;    TOE AMPUTATION Left 5/13/2021    Procedure: AMPUTATION, TOE;  Surgeon: HOMERO Hayden II, MD;  [facility];  Service: Vascular;   Laterality: Left;    TOTAL REDUCTION MAMMOPLASTY Right 2013       Time Tracking:     PT Received On: 02/18/22  PT Start Time: 0849     PT Stop Time: 0904  PT Total Time (min): 15 min     Billable Minutes: Evaluation 15

## 2022-02-18 NOTE — ASSESSMENT & PLAN NOTE
Troponin elevated to .401 at admission, likely secondary to fluid overload in a patient who missed HD. No ST elevations or depressions on EKG changes.     - f/u with repeat troponin

## 2022-02-18 NOTE — ASSESSMENT & PLAN NOTE
Presented 2/5 to ED had initial troponin of 3 then peaked at 7.4. TTE at that time with acute drop in EF from 60-40% with concern for ACS, left heart cath done, LCx with 70% occlusion and drug eluting stent placed 2/8 in Lcx and LAD.     -Per chart review plan was for DAPT x 1 month, then plavix alone (starting 3/8) given that she is also on eliquis  - Continue metoprolol, statin

## 2022-02-18 NOTE — RESIDENT HANDOFF
Handoff     Primary Team: Networked reference to record PCT  Room Number: [id number] A     Patient Name: Kylie King MRN: [id number]     Date of Birth: [id number] Allergies: Cyclobenzaprine, Erythromycin, Keflex [cephalexin], and Erythromycin base     Age: 66 y.o. Admit Date: 2/17/2022     Sex: female  BMI: Body mass index is 40.57 kg/m².     Code Status: Prior        Illness Level (current clinical status): Watcher - No    Reason for Admission: Encephalopathy likely uremic secondary to missed HD    Brief HPI (pertinent PMH and diagnosis or differential diagnosis): 66 y.o. year old female past medical history of CAD s/p PCI on 02/08/2022 (on DAPT), left breast cancer s/p mastectomy, chronic respiratory failure (on home 2L of O2), COPD, ESRD on HD TTS w/ anemia of chronic disease, Atrial Fib (failed DCCV in Nov 2021); on eliquis and amiodarone, CVA, PVD (s/p left foot amputation on Plavix), HTN, T2DM, HLD, who presented to the ED for generalized weakness and multiple falls. Patient was somnolent upon our interview. History was obtained from chart review. She reportedly last received HD due to her debility. Her last HD was about a week ago. Per ED nurse, patient was conversational upon arrival but suddenly became somnolent after about an hour, which is somewhat similar to her presentation during her most recent hospital admission.   Of note, the patient was admitted to hospital medicine on 02/05 for hyperkalemia and hypervolemia in the setting of missed HD, NSTEMI for which she underwent LHC on 02/07 and 02/08 with successful treatment of critical ostial left circumflex disease by doing V stenting of the ostial left main and left circumflex on 02/08. She was discharged with aspirin, plavix and to continue eliquis for Atrial fibrillation with f/u with cardiology. The patient refused HD the day prior to hospital discharge on 12/08.       Upon arrival, she was hemodynamically stable, however was hypertensive. Labs  revealed potassium of 7.0. . ABG with pH of 7.3, Co2 49, BNP 4000s. ECG without hyperkalemic changes but revealed atrial fibrillation. CXR appeared grossly same from prior, though not an optimal study due to rotation. CT Head was unremarkable. CT A/P revealed probable subacute fracture of the right inferior pubic ramus. She was shifted for hyperkalemia and received calcium gluc.  Nephrology was consulted and she was initiated on HD.     Hospital Course (updated, brief assessment by system or problem, significant events): Patient was hypertensive at arrival otherwise HDS. Her initial labs revealed a potassium of 7.0. . ABG with pH of 7.3, Co2 49, BNP 4000s. ECG without hyperkalemic changes but revealed atrial fibrillation. CXR appeared grossly same from prior, though not an optimal study due to rotation. CT Head was unremarkable. CT A/P revealed probable subacute fracture of the right inferior pubic ramus. She was shifted for hyperkalemia and received calcium gluc.  Nephrology was consulted and she was initiated on HD while in the ED. She was admitted to MICU for acute encephalopathy and hyperkalemia. Post HD Miss King had improvements in her metabolic abnormalities as well as improvement in her mental status, now alert and oriented by 3 and stable for step down.     Tasks (specific, using if-then statements): None    Estimated Discharge Date: 2/19/2022    Discharge Disposition: Still a Patient

## 2022-02-18 NOTE — ASSESSMENT & PLAN NOTE
At presentation k was 7. No EKG changes, given calcium gluconate, shifted, and HD initiated.     - Trend RFP daily

## 2022-02-18 NOTE — NURSING TRANSFER
Nursing Transfer Note      2/18/2022     Reason patient is being transferred: decreased level of care    Transfer To: [id number]    Transfer via bed bed    Transfer with cardiac monitoring    Transported by JAVIER Burger    Medicines sent: no     Any special needs or follow-up needed: none    Chart send with patient: Yes    Notified: Lucrecia    Patient reassessed at: 2/18 0900 (date, time)    Upon arrival to floor: cardiac monitor applied, patient oriented to room, call bell in reach and bed in lowest position

## 2022-02-18 NOTE — ASSESSMENT & PLAN NOTE
Last a1c 5.9, BG on admission 130.     - POCT  - Diabetic diet when more awake, NPO for now  - SSI for NPO  - At home takes levemir 4 units BID, and aspart 3 TID wm. Will hold for now.

## 2022-02-18 NOTE — ASSESSMENT & PLAN NOTE
Presented 2/5 to ED had initial troponin of 3 then peaked at 7.4. TTE at that time with acute drop in EF from 60-40% with concern for ACS, left heart cath done, LCx with 70% occlusion and drug eluting stent placed 2/8 in Lcx and LAD.     -Plan was for DAPT x 1 month, then plavix alone (3/8) given that she is also on eliquis  - Continue metoprolol, statin

## 2022-02-18 NOTE — ASSESSMENT & PLAN NOTE
Takes home lopressor 25 BID. In the past has taken amlodipine 10mg, coreg 12.5mg BID, hydralazine 50mg TID    - HD per nephrology  - Continue lopressor

## 2022-02-18 NOTE — PT/OT/SLP EVAL
"Occupational Therapy  Co- Evaluation    Name: Kylie King  MRN: [id number]  Admitting Diagnosis: ESRD  Pt s/p fall in the home with missed HD x 1 week due to reports of weakness.  Pt with recent admit for A-fib and NSTEMI  and was d/c home 2/8/22    Recommendations:     Discharge Recommendations:  HH  Pt appropriate for d/c home with continued 24 hour assist as prior to arrival.     Assessment:     Kylie King is a 66 y.o. female with a medical diagnosis of ESRDPerformance deficits affecting function: weakness,impaired endurance,impaired self care skills,impaired functional mobilty,gait instability,impaired balance,decreased upper extremity function,decreased lower extremity function.    Pt tolerated session fair as she declined functional mobility this date without specific reason.   Anticipate pt will be appropriate for d/c home with HH and 24 hour supervision/assist as PTA   Rehab Prognosis: Good; patient would benefit from acute skilled OT services to address these deficits and reach maximum level of function.       Plan:     Patient to be seen 3 x/week to address the above listed problems via self-care/home management,therapeutic activities,therapeutic exercises  · Plan of Care Expires:    · Plan of Care Reviewed with: patient    Subjective     Pt reports, "I just don't want to" re: participation with functional mobility.     Occupational Profile:  Pt reports she resides alone and has 24 hour supervision/assist from family. Pt reports she was modified independent prior to arrival with rollator. Pt reports that the person who assists her daily can provide added physical assist if needed upon d/c.   Pt has rollator, QC, home oxygen (2 LPM PRN), shower chair.     Pain/Comfort:  · Pain Rating 1: 3/10  · Location - Side 1: Right  · Location 1: abdomen  · Pain Addressed 1: Reposition,Distraction    Patients cultural, spiritual, Church conflicts given the current situation: no    Objective: "     Communicated with: nsg prior to session.  Patient found supine in bed with tele, pulse ox, BP cuff and IV.   Cotx completed this date to optimize functional performance/safety given impaired tolerated for activity in setting of ICU.     General Precautions: Standard, fall     Occupational Performance:    Bed Mobility:    Pt initiated rolling in bed toward both right<>left by reaching for hand rail. Full roll unable to be completed as pt reports pain.   Pt declined further activity    Activities of Daily Living:  · Pt able to eat ice chips from cup with set-up. Simulated g/h skills with set-up bed level.       Cognitive/Visual Perceptual:  Pt awake, alert and following commands.     Physical Exam:  Pt is right hand dominant and demo WFL UE strength/ROM, coordination and sensation.     AMPAC 6 Click ADL:  AMPAC Total Score: 10    Treatment & Education:  Pt declined functional activity stating she did not want to participate. Education provided re: importance and purpose of OOB activity including risks associated with prolonged immobility. Pt verb understanding, but continued to refuse.    Education provided re: OT POC and safety with functional mobility/ADl skills.   Education:    Patient left supine with all lines intact, call button in reach and nsg notified    GOALS:   Multidisciplinary Problems     Occupational Therapy Goals        Problem: Occupational Therapy Goal    Goal Priority Disciplines Outcome Interventions   Occupational Therapy Goal     OT, PT/OT Ongoing, Progressing    Description: Goals to be met by: 7 days 2/25/22     Patient will increase functional independence with ADLs by performing:    Pt to complete UE dressing with set-up  Pt to complete LE dressing with RONA    Pt to complete toileting with SBA  Pt to complete g/h skills in standing with SBA  Pt to complete t/f to bed, chair and commode with SBA                   History:     Past Medical History:   Diagnosis Date    Anxiety     Breast  cancer     left    Carotid artery disease     Cataract     Choledocholithiasis     s/p ERCP and stent placement    Chronic diastolic CHF (congestive heart failure)     Chronic obstructive pulmonary disease     Chronic venous insufficiency     Depression     End-stage renal disease on hemodialysis     M/W/F    GERD (gastroesophageal reflux disease)     History of breast cancer     History of colon cancer 2001    s/p sigmoid colectomy    History of kidney stones     History of osteomyelitis of left foot     History of pancreatitis     History of stroke     Hyperlipidemia     Hypertension     Intracerebral hemorrhage     Lumbar degenerative disc disease     Lumbar spinal stenosis     Morbid obesity     Paroxysmal atrial fibrillation     Peripheral artery disease     Peripheral neuropathy     Tobacco dependence     Type II diabetes mellitus     Urinary incontinence        Past Surgical History:   Procedure Laterality Date    ANGIOGRAM, CORONARY, WITH LEFT HEART CATHETERIZATION N/A 2/7/2022    Procedure: Angiogram, Coronary, with Left Heart Cath;  Surgeon: Adam Rutherford MD;  Location: [facility] CATH LAB;  Service: Cardiology;  Laterality: N/A;    ANGIOGRAPHY OF LOWER EXTREMITY Right 9/17/2020    Procedure: Angiogram Extremity Unilateral;  Surgeon: RICK Pollard III, MD;  Location: [facility] OR [address] [place] [address];  Service: Peripheral Vascular;  Laterality: Right;  6.2 minutes of fluro  690.88 mGy  112.64 Gycm2  55 ml    ANGIOGRAPHY OF LOWER EXTREMITY Left 5/13/2021    Procedure: Angiogram Extremity Unilateral;  Surgeon: HOMERO Hayden II, MD;  Location: [facility] OR [address] [place] [address];  Service: Vascular;  Laterality: Left;  35.1 min  971.41 mGy  190.27 Gy.cm  101ml Dye    ANGIOGRAPHY OF LOWER EXTREMITY Right 7/19/2021    Procedure: Angiogram Extremity Unilateral;  Surgeon: HOMERO Hayden II, MD;  Location: [address];  Service: Vascular;  Laterality: Right;  3.0 min  121.00 mGy  26.8713 Gy.cm  13ml Dye     AORTOGRAPHY N/A 5/13/2021    Procedure: AORTOGRAM;  Surgeon: HOMERO Hayden II, MD;  [facility];  Service: Vascular;  Laterality: N/A;    AV FISTULA PLACEMENT Right 5/28/2018    Procedure: CREATION -FISTULA-AV;  Surgeon: RICK Pollard III, MD;  [facility];  Service: Peripheral Vascular;  Laterality: Right;    BREAST BIOPSY  1/2012    left breast invasive mammary carcinoma (lateral bx) and intraductal papilloma (medial bx)    CARDIOVERSION  11/22/2021    CARDIOVERSION  11/22/2021    Procedure: Cardioversion;  Surgeon: Ad Garcia MD;  Location: [facility] CATH LAB;  Service: Cardiology;;    CATARACT EXTRACTION W/  INTRAOCULAR LENS IMPLANT Right 1/24/2019        CATARACT EXTRACTION W/  INTRAOCULAR LENS IMPLANT Left 1/31/2019    Procedure: EXTRACTION, CATARACT, WITH IOL INSERTION;  Surgeon: Immanuel Moncada MD;  Location: [facility];  Service: Ophthalmology;  Laterality: Left;    CHOLECYSTECTOMY  2001    CORONARY ANGIOGRAPHY N/A 2/8/2022    Procedure: ANGIOGRAM, CORONARY ARTERY;  Surgeon: Abelardo Betancur MD;  Location: [facility] CATH LAB;  Service: Cardiology;  Laterality: N/A;    DEBRIDEMENT OF FOOT Right 2/17/2021    Procedure: DEBRIDEMENT, FOOT right plantar ulcer;  Surgeon: Sonja Corral DPM;  Location: [facility];  Service: Podiatry;  Laterality: Right;    ERCP W/ SPHICTEROTOMY      FINE NEEDLE ASPIRATION  1999    Right breast    FOOT AMPUTATION Left 6/1/2021    Procedure: Transmetatarsal amputation;  Surgeon: Billy Robles DPM;  [address]  Service: Podiatry;  Laterality: Left;    FOOT AMPUTATION Left 7/23/2021    Procedure: AMPUTATION, FOOT;  Surgeon: Billy Robles DPM;  [facility];  Service: Podiatry;  Laterality: Left;    FOOT AMPUTATION THROUGH METATARSAL Right 10/15/2020    Procedure: PARTIAL RAY AMPUTATION GREAT TOE;  Surgeon: Billy Robles DPM;  [facility];  Service: Podiatry;  Laterality: Right;  Stretcher OK     HERNIA REPAIR      LAPAROSCOPIC NEPHRECTOMY Left 2011    MASTECTOMY  2013    left    OOPHORECTOMY Left 2001    REVISION OF ARTERIOVENOUS FISTULA Right 8/15/2018    Procedure: REVISION, AV FISTULA;  Surgeon: RICK Pollard III, MD;  Location: [facility] OR [address] [place] [address];  Service: Peripheral Vascular;  Laterality: Right;    SIGMOIDECTOMY  2001    SURGICAL REMOVAL OF METATARSAL HEAD Right 2/17/2021    Procedure: OSTECTOMY, METATARSAL BONE, diatal 1st;  Surgeon: Sonja Corral DPM;  Location: [facility];  Service: Podiatry;  Laterality: Right;    TOE AMPUTATION Left 5/13/2021    Procedure: AMPUTATION, TOE;  Surgeon: HOMERO Hayden II, MD;  Location: [facility] OR [address] [place] [address];  Service: Vascular;  Laterality: Left;    TOTAL REDUCTION MAMMOPLASTY Right 2013       Time Tracking:     OT Date of Treatment: 02/18/22  OT Start Time: 0849  OT Stop Time: 0901  OT Total Time (min): 12 min    Billable Minutes:Evaluation 12    2/18/2022

## 2022-02-18 NOTE — ASSESSMENT & PLAN NOTE
Missed HD, BNP 4400 at admit. CXR with pulm edema.     TTE 02/05/2022  · Severe left atrial enlargement.  · The left ventricle is normal in size with mildly decreased systolic function.  · The estimated ejection fraction is 40%.  · There are segmental left ventricular wall motion abnormalities: apex, apical septum, mid and apical anterolateral, apical inferior and apical anterior wall.  · Grade II left ventricular diastolic dysfunction.  · Normal right ventricular size with normal right ventricular systolic function.  · There is mild aortic valve stenosis.  · Aortic valve area is 1.78 cm2; peak velocity is 0.71 m/s; mean gradient is 6 mmHg.  · Intermediate central venous pressure (8 mmHg).  · The estimated PA systolic pressure is 19 mmHg.     Plan   - Fluid removal with HD  - Low salt diet and renal diet when more awake. NPO for now  - Fluid restriction of 1500ml daily  - Continue GDMT

## 2022-02-18 NOTE — HOSPITAL COURSE
Patient was hypertensive at arrival otherwise HDS. Her initial labs revealed a potassium of 7.0. . ABG with pH of 7.3, Co2 49, BNP 4000s. ECG without hyperkalemic changes but revealed atrial fibrillation. CXR appeared grossly same from prior, though not an optimal study due to rotation. CT Head was unremarkable. CT A/P revealed probable subacute fracture of the right inferior pubic ramus. She was shifted for hyperkalemia and received calcium gluc.  Nephrology was consulted and she was initiated on HD while in the ED. She was admitted to MICU for acute encephalopathy and hyperkalemia. Post HD Miss King had improvements in her metabolic abnormalities as well as improvement in her mental status, now alert and oriented by 3 and stable for step down

## 2022-02-18 NOTE — ASSESSMENT & PLAN NOTE
Missed HD, BNP 4400 at admit. CXR with pulm edema.     TTE 02/05/2022  · Severe left atrial enlargement.  · The left ventricle is normal in size with mildly decreased systolic function.  · The estimated ejection fraction is 40%.  · There are segmental left ventricular wall motion abnormalities: apex, apical septum, mid and apical anterolateral, apical inferior and apical anterior wall.  · Grade II left ventricular diastolic dysfunction.  · Normal right ventricular size with normal right ventricular systolic function.  · There is mild aortic valve stenosis.  · Aortic valve area is 1.78 cm2; peak velocity is 0.71 m/s; mean gradient is 6 mmHg.  · Intermediate central venous pressure (8 mmHg).  · The estimated PA systolic pressure is 19 mmHg.     Plan   - Fluid removal with HD  - Low salt diet and renal diet   - Fluid restriction of 1500ml daily  - Continue GDMT

## 2022-02-18 NOTE — SUBJECTIVE & OBJECTIVE
Interval History/Significant Events: NAEON. Patient much improved after HD. States that prior to HD being initiated in ED, she last received HD the Saturday prior. Afebrile, VSS. Pending step down    Review of Systems   Constitutional: Negative for chills and fever.   HENT: Negative for trouble swallowing.    Respiratory: Negative for shortness of breath and wheezing.    Cardiovascular: Negative for chest pain, palpitations and leg swelling.   Gastrointestinal: Negative for abdominal distention, abdominal pain, constipation, diarrhea and nausea.   Genitourinary: Positive for decreased urine volume.   Musculoskeletal: Positive for myalgias (Right sided rib pain x 1 month).   Skin: Negative for rash.   Neurological: Positive for weakness. Negative for headaches.   Psychiatric/Behavioral: Negative for agitation, behavioral problems and confusion.     Objective:     Vital Signs (Most Recent):  Temp: 98.1 °F (36.7 °C) (02/18/22 0700)  Pulse: 61 (02/18/22 0700)  Resp: 15 (02/18/22 0700)  BP: 139/62 (02/18/22 0700)  SpO2: 95 % (02/18/22 0700) Vital Signs (24h Range):  Temp:  [97.6 °F (36.4 °C)-98.1 °F (36.7 °C)] 98.1 °F (36.7 °C)  Pulse:  [] 61  Resp:  [15-25] 15  SpO2:  [92 %-100 %] 95 %  BP: (126-196)/(51-83) 139/62   Weight: 107.2 kg (236 lb 5.3 oz)  Body mass index is 40.57 kg/m².      Intake/Output Summary (Last 24 hours) at 2/18/2022 0810  Last data filed at 2/17/2022 2100  Gross per 24 hour   Intake 500 ml   Output 3000 ml   Net -2500 ml       Physical Exam  Vitals and nursing note reviewed.   Constitutional:       General: She is not in acute distress.     Appearance: She is obese. She is ill-appearing. She is not diaphoretic.   HENT:      Head: Normocephalic and atraumatic.      Nose: Nose normal.   Eyes:      General: No scleral icterus.  Cardiovascular:      Rate and Rhythm: Normal rate and regular rhythm.      Heart sounds: No murmur heard.      Pulmonary:      Effort: Pulmonary effort is normal. No  respiratory distress.      Breath sounds: No wheezing.   Abdominal:      General: There is distension.      Palpations: Abdomen is soft.      Tenderness: There is no abdominal tenderness.   Musculoskeletal:         General: Deformity (Left foot amputation at midfoot, right big toe amputation) present. No signs of injury.      Right lower leg: No edema.      Left lower leg: No edema.      Comments: Area of upper medial breast that was hard, abnormal contour of breast, and scarring present   Skin:     Capillary Refill: Capillary refill takes less than 2 seconds.      Coloration: Skin is not jaundiced.      Findings: Bruising and erythema (Lower extremities brawny bilaterally to mid lower leg) present.   Neurological:      Mental Status: She is alert and oriented to person, place, and time.   Psychiatric:         Mood and Affect: Mood normal.         Vents:     Lines/Drains/Airways     Drain                 Hemodialysis AV Fistula Right forearm -- days          Airway                 Airway - Non-Surgical 11/22/21 1308 Nasal Cannula 87 days          Peripheral Intravenous Line                 Peripheral IV - Single Lumen 02/17/22 1526 20 G Left Antecubital <1 day              Significant Labs:    CBC/Anemia Profile:  Recent Labs   Lab 02/17/22  1503 02/17/22  1510 02/17/22  1555 02/18/22  0234   WBC 8.74  --   --  4.41   HGB 9.5*  --   --  8.6*   HCT 32.2* 29* 30* 28.6*     --   --  167   MCV 94  --   --  95   RDW 20.4*  --   --  20.3*        Chemistries:  Recent Labs   Lab 02/17/22  1503 02/18/22  0234    137   K 7.0* 5.1   CL 97 97   CO2 19* 24   * 50*   CREATININE 13.8* 8.4*   CALCIUM 9.1 8.1*   ALBUMIN 3.4* 3.0*   PROT 7.0 5.8*   BILITOT 0.8 0.8   ALKPHOS 89 79   ALT 8* 7*   AST 14 11   MG  --  1.8   PHOS  --  5.6*       POCT Glucose:   Recent Labs   Lab 02/17/22  1841 02/18/22  0232 02/18/22  0532   POCTGLUCOSE 133* 94 87     All pertinent labs within the past 24 hours have been  reviewed.    Significant Imaging:  I have reviewed all pertinent imaging results/findings within the past 24 hours.

## 2022-02-18 NOTE — ASSESSMENT & PLAN NOTE
At presentation k was 7. No EKG changes, given calcium gluconate, shifted, and HD initiated. Improved post HD, now 5.1.     - Trend RFP daily

## 2022-02-18 NOTE — PLAN OF CARE
Problem: Physical Therapy Goal  Goal: Physical Therapy Goal  Description: Goals to be met by: 3/4/22     Patient will increase functional independence with mobility by performin. Supine to sit with Moderate Assistance  2. Sit to stand transfer with Moderate Assistance  3. Gait  x 50 feet with Moderate Assistance using Rolling Walker.   4. Ascend/descend 4 stair with bilateral Handrails Moderate Assistance using 4 point cane.   5. Lower extremity exercise program x15 reps per handout, with supervision    Outcome: Ongoing, Progressing   Eval performed today. Goals appropriate

## 2022-02-18 NOTE — NURSING
Pt refused to take sevelamer Carbonate this shift, educated about medication, verbalized understanding however still refused.

## 2022-02-18 NOTE — ASSESSMENT & PLAN NOTE
Patient with hx of ESRD on dialysis, history of admissions post missing dialysis where she becomes somnolent and improves after HD initiated. CTH without acute intracranial abnormalities making stroke or bleed less likely, no white count and afebrile making infection less likely. Most likely metabolic given missed HD and and elevated BUN greater than 100.     - HD in ED, with improvement in mental status. BUN down to 50, and AXO by 3.   - Daily metabolic panel  - phos, Mg daily

## 2022-02-18 NOTE — PROGRESS NOTES
[facility] - Cardiac Intensive Care  Critical Care Medicine  Progress Note    Patient Name: Kylie King  MRN: [id number]  Admission Date: 2/17/2022  Hospital Length of Stay: 1 days  Code Status: Prior  Attending Provider: Abelardo Castro MD  Primary Care Provider: Virginia Foote MD   Principal Problem: <principal problem not specified>    Subjective:     HPI:  Kylie King is a 66 y.o. year old female past medical history of CAD s/p PCI on 02/08/2022 (on DAPT), left breast cancer s/p mastectomy, chronic respiratory failure (on home 2L of O2), COPD, ESRD on HD TTS w/ anemia of chronic disease, Atrial Fib (failed DCCV in Nov 2021); on eliquis and amiodarone, CVA, PVD (s/p left foot amputation on Plavix), HTN, T2DM, HLD, who presented to the ED for generalized weakness and multiple falls. Patient was somnolent upon our interview. History was obtained from chart review. She reportedly last received HD due to her debility. Her last HD was about a week ago. Per ED nurse, patient was conversational upon arrival but suddenly became somnolent after about an hour, which is somewhat similar to her presentation during her most recent hospital admission.   Of note, the patient was admitted to hospital medicine on 02/05 for hyperkalemia and hypervolemia in the setting of missed HD, NSTEMI for which she underwent LHC on 02/07 and 02/08 with successful treatment of critical ostial left circumflex disease by doing V stenting of the ostial left main and left circumflex on 02/08. She was discharged with aspirin, plavix and to continue eliquis for Atrial fibrillation with f/u with cardiology. The patient refused HD the day prior to hospital discharge on 12/08.      Upon arrival, she was hemodynamically stable, however was hypertensive. Labs revealed potassium of 7.0. . ABG with pH of 7.3, Co2 49, BNP 4000s. ECG without hyperkalemic changes but revealed atrial fibrillation. CXR appeared grossly same from prior, though  not an optimal study due to rotation. CT Head was unremarkable. CT A/P revealed probable subacute fracture of the right inferior pubic ramus. She was shifted for hyperkalemia and received calcium gluc.  Nephrology was consulted and she was initiated on HD. Patient was admitted to MICU for further management of hyperkalemia and encephalopathy.       Hospital/ICU Course:  Patient was hypertensive at arrival otherwise HDS. Her initial labs revealed a potassium of 7.0. . ABG with pH of 7.3, Co2 49, BNP 4000s. ECG without hyperkalemic changes but revealed atrial fibrillation. CXR appeared grossly same from prior, though not an optimal study due to rotation. CT Head was unremarkable. CT A/P revealed probable subacute fracture of the right inferior pubic ramus. She was shifted for hyperkalemia and received calcium gluc.  Nephrology was consulted and she was initiated on HD while in the ED. She was admitted to MICU for acute encephalopathy and hyperkalemia. Post HD Miss King had improvements in her metabolic abnormalities as well as improvement in her mental status, now alert and oriented by 3 and stable for step down      Interval History/Significant Events: NAEON. Patient much improved after HD. States that prior to HD being initiated in ED, she last received HD the Saturday prior. Afebrile, VSS. Pending step down    Review of Systems   Constitutional: Negative for chills and fever.   HENT: Negative for trouble swallowing.    Respiratory: Negative for shortness of breath and wheezing.    Cardiovascular: Negative for chest pain, palpitations and leg swelling.   Gastrointestinal: Negative for abdominal distention, abdominal pain, constipation, diarrhea and nausea.   Genitourinary: Positive for decreased urine volume.   Musculoskeletal: Positive for myalgias (Right sided rib pain x 1 month).   Skin: Negative for rash.   Neurological: Positive for weakness. Negative for headaches.   Psychiatric/Behavioral:  Negative for agitation, behavioral problems and confusion.     Objective:     Vital Signs (Most Recent):  Temp: 98.1 °F (36.7 °C) (02/18/22 0700)  Pulse: 61 (02/18/22 0700)  Resp: 15 (02/18/22 0700)  BP: 139/62 (02/18/22 0700)  SpO2: 95 % (02/18/22 0700) Vital Signs (24h Range):  Temp:  [97.6 °F (36.4 °C)-98.1 °F (36.7 °C)] 98.1 °F (36.7 °C)  Pulse:  [] 61  Resp:  [15-25] 15  SpO2:  [92 %-100 %] 95 %  BP: (126-196)/(51-83) 139/62   Weight: 107.2 kg (236 lb 5.3 oz)  Body mass index is 40.57 kg/m².      Intake/Output Summary (Last 24 hours) at 2/18/2022 0810  Last data filed at 2/17/2022 2100  Gross per 24 hour   Intake 500 ml   Output 3000 ml   Net -2500 ml       Physical Exam  Vitals and nursing note reviewed.   Constitutional:       General: She is not in acute distress.     Appearance: She is obese. She is ill-appearing. She is not diaphoretic.   HENT:      Head: Normocephalic and atraumatic.      Nose: Nose normal.   Eyes:      General: No scleral icterus.  Cardiovascular:      Rate and Rhythm: Normal rate and regular rhythm.      Heart sounds: No murmur heard.      Pulmonary:      Effort: Pulmonary effort is normal. No respiratory distress.      Breath sounds: No wheezing.   Abdominal:      General: There is distension.      Palpations: Abdomen is soft.      Tenderness: There is no abdominal tenderness.   Musculoskeletal:         General: Deformity (Left foot amputation at midfoot, right big toe amputation) present. No signs of injury.      Right lower leg: No edema.      Left lower leg: No edema.      Comments: Area of upper medial breast that was hard, abnormal contour of breast, and scarring present   Skin:     Capillary Refill: Capillary refill takes less than 2 seconds.      Coloration: Skin is not jaundiced.      Findings: Bruising and erythema (Lower extremities brawny bilaterally to mid lower leg) present.   Neurological:      Mental Status: She is alert and oriented to person, place, and time.    Psychiatric:         Mood and Affect: Mood normal.         Vents:     Lines/Drains/Airways     Drain                 Hemodialysis AV Fistula Right forearm -- days          Airway                 Airway - Non-Surgical 11/22/21 1308 Nasal Cannula 87 days          Peripheral Intravenous Line                 Peripheral IV - Single Lumen 02/17/22 1526 20 G Left Antecubital <1 day              Significant Labs:    CBC/Anemia Profile:  Recent Labs   Lab 02/17/22  1503 02/17/22  1510 02/17/22  1555 02/18/22  0234   WBC 8.74  --   --  4.41   HGB 9.5*  --   --  8.6*   HCT 32.2* 29* 30* 28.6*     --   --  167   MCV 94  --   --  95   RDW 20.4*  --   --  20.3*        Chemistries:  Recent Labs   Lab 02/17/22  1503 02/18/22  0234    137   K 7.0* 5.1   CL 97 97   CO2 19* 24   * 50*   CREATININE 13.8* 8.4*   CALCIUM 9.1 8.1*   ALBUMIN 3.4* 3.0*   PROT 7.0 5.8*   BILITOT 0.8 0.8   ALKPHOS 89 79   ALT 8* 7*   AST 14 11   MG  --  1.8   PHOS  --  5.6*       POCT Glucose:   Recent Labs   Lab 02/17/22  1841 02/18/22  0232 02/18/22  0532   POCTGLUCOSE 133* 94 87     All pertinent labs within the past 24 hours have been reviewed.    Significant Imaging:  I have reviewed all pertinent imaging results/findings within the past 24 hours.      ABG  Recent Labs   Lab 02/17/22  1633   PH 7.303*   PO2 26*   PCO2 49.1*   HCO3 24.3   BE -2     Assessment/Plan:     Neuro  Encephalopathy, metabolic  Patient with hx of ESRD on dialysis, history of admissions post missing dialysis where she becomes somnolent and improves after HD initiated. CTH without acute intracranial abnormalities making stroke or bleed less likely, no white count and afebrile making infection less likely. Most likely metabolic given missed HD and and elevated BUN greater than 100.     - HD in ED, with improvement in mental status. BUN down to 50, and AXO by 3.   - Daily metabolic panel  - phos, Mg daily      Pulmonary  COPD (chronic obstructive pulmonary  disease)  On home 2L O2, was on 2 L NC in ED. Unclear whether has COPD or not, no PFT's I was able to find in Epic and she is not on any COPD therapies.     - maintain sats 88-92  - Supplemental O2 as needed    Cardiac/Vascular  History of non-ST elevation myocardial infarction (NSTEMI)  Presented 2/5 to ED had initial troponin of 3 then peaked at 7.4. TTE at that time with acute drop in EF from 60-40% with concern for ACS, left heart cath done, LCx with 70% occlusion and drug eluting stent placed 2/8 in Lcx and LAD.     -Per chart review plan was for DAPT x 1 month, then plavix alone (starting 3/8) given that she is also on eliquis  - Continue metoprolol, statin    Atrial fibrillation  Follows with Dr. Ayaan CASAREZ    - continue amiodarone  - continue lopressor  - continue eliquis ([id number] of 7)  Maintain K>4, Mg>2    Peripheral artery disease  -Continue aspirin, clopidogrel, and statin    Acute on chronic diastolic heart failure  Missed HD, BNP 4400 at admit. CXR with pulm edema.     TTE 02/05/2022  · Severe left atrial enlargement.  · The left ventricle is normal in size with mildly decreased systolic function.  · The estimated ejection fraction is 40%.  · There are segmental left ventricular wall motion abnormalities: apex, apical septum, mid and apical anterolateral, apical inferior and apical anterior wall.  · Grade II left ventricular diastolic dysfunction.  · Normal right ventricular size with normal right ventricular systolic function.  · There is mild aortic valve stenosis.  · Aortic valve area is 1.78 cm2; peak velocity is 0.71 m/s; mean gradient is 6 mmHg.  · Intermediate central venous pressure (8 mmHg).  · The estimated PA systolic pressure is 19 mmHg.     Plan   - Fluid removal with HD  - Low salt diet and renal diet   - Fluid restriction of 1500ml daily  - Continue GDMT     Elevated troponin  Troponin elevated to .401 at admission, likely secondary to fluid overload in a patient who missed HD. No ST  elevations or depressions on EKG changes.     - Repeat troponin decreased. No longer trending    Hypertension  Takes home lopressor 25 BID. In the past has taken amlodipine 10mg, coreg 12.5mg BID, hydralazine 50mg TID    - HD per nephrology  - Continue lopressor     Renal/  Dependence on renal dialysis  See ESRD on dialysis    Hyperkalemia  At presentation k was 7. No EKG changes, given calcium gluconate, shifted, and HD initiated. Improved post HD, now 5.1.     - Trend RFP daily    End-stage renal disease on hemodialysis  Patient gets HD TTh S. Presented to ED after missing HD (5-6 days prior).     - Nephro consulted, HD initiated in ED with much improvement in status post HD  - RFP daily  - Avoid nephrotoxins, renally dose meds  - renal diet, low NA    Endocrine  Type II diabetes mellitus  Last a1c 5.9, BG on admission 130.     - POCT  - Renal diet, Low NA  - SSI   - At home takes levemir 4 units BID, and aspart 3 TID wm. Will hold for now.     Orthopedic  Pubic ramus fracture  Seen on CT abdomen pelvis. Has history of frequent falls    - PT/OT    Other  Debility  PT/OT       Critical Care Daily Checklist:    A: Awake: RASS Goal/Actual Goal:    Actual: Sanchez Agitation Sedation Scale (RASS): Alert and calm   B: Spontaneous Breathing Trial Performed?     C: SAT & SBT Coordinated?                        D: Delirium: CAM-ICU Overall CAM-ICU: Negative   E: Early Mobility Performed? No   F: Feeding Goal:    Status:     Current Diet Order   Procedures    Diet renal [facility]; Low Potassium, Cardiac (Low Na/Chol); Isolation Tray - Regular [place]; Fluid - 1500mL     Order Specific Question:   Indicate patient location for additional diet options:     Answer:   [facility]     Order Specific Question:   Additional Diet Options:     Answer:   Low Potassium     Order Specific Question:   Additional Diet Options:     Answer:   Cardiac (Low Na/Chol)     Order Specific Question:   Tray type:     Answer:    Isolation Tray - Regular [place]     Order Specific Question:   Fluid restriction:     Answer:   Fluid - 1500mL      AS: Analgesia/Sedation none   T: Thromboembolic Prophylaxis eliquis   H: HOB > 300 Yes   U: Stress Ulcer Prophylaxis (if needed)    G: Glucose Control SSI   B: Bowel Function Stool Occurrence: 0   I: Indwelling Catheter (Lines & Mace) Necessity PIV   D: De-escalation of Antimicrobials/Pharmacotherapies None    Plan for the day/ETD Step down    Code Status:  Family/Goals of Care: Prior         Critical secondary to Patient has an abrupt change in neurologic status: acute encephalopathy      Critical care was time spent personally by me on the following activities: development of treatment plan with patient or surrogate and bedside caregivers, discussions with consultants, evaluation of patient's response to treatment, examination of patient, ordering and performing treatments and interventions, ordering and review of laboratory studies, ordering and review of radiographic studies, pulse oximetry, re-evaluation of patient's condition. This critical care time did not overlap with that of any other provider or involve time for any procedures.     Fracisco Paula,   Critical Care Medicine  [facility] - Cardiac Intensive Care

## 2022-02-18 NOTE — BRIEF OP NOTE
[facility]  Brief Operative Note     SUMMARY     Surgery Date: 8/15/2018     Surgeon(s) and Role:     * RICK Pollard III, MD - Primary     * Malinda Rose MD - Resident - Assisting        Pre-op Diagnosis:  Malfunction of arteriovenous dialysis fistula, subsequent encounter [T82.590D]    Post-op Diagnosis:  Post-Op Diagnosis Codes:     * Malfunction of arteriovenous dialysis fistula, subsequent encounter [T82.590D]    Procedure(s) (LRB):  REVISION, AV FISTULA (Right)  TRANSPOSITION, VEIN (Right)    Anesthesia: Regional    Description of the findings of the procedure: successful tranposition    Findings/Key Components: as above    Estimated Blood Loss: 10cc         Specimens:   Specimen (12h ago, onward)    None          Discharge Note    SUMMARY     Admit Date: 8/15/2018    Discharge Date and Time: 8/15/18    Hospital Course (synopsis of major diagnoses, care, treatment, and services provided during the course of the hospital stay): successful outpatient procedure    Final Diagnosis: Post-Op Diagnosis Codes:     * Malfunction of arteriovenous dialysis fistula, subsequent encounter [T82.590D]    Disposition: home    Follow Up/Patient Instructions: Diet: renal  Act: ad nehemiah  FU: 2 week with Quantitative AVF duplex     Medications: pre-op + analgesic         negative

## 2022-02-18 NOTE — NURSING
Pt arrived to unit via bed, accompanied by ICU nurse x2, and PCT, AOx4, able to voice needs, no distress noted, no c/o pain, IV flushed and patent, 2L NC, telemetry in place, VSS, suction set up, educated on call light, bed locked in lowest position, call bell in reach, instructed to call when assistance needed.

## 2022-02-18 NOTE — NURSING
Pt has had 4 BM large liquid stools. MD notified. This nurse requested a lab to assess for Cdiff.

## 2022-02-18 NOTE — PLAN OF CARE
[facility] - Telemetry Stepdown  Initial Discharge Assessment       Primary Care Provider: Virginia Foote MD    Admission Diagnosis: Hyperkalemia [E87.5]  ESRD (end stage renal disease) on dialysis [N18.6, Z99.2]  Falls [W19.XXXA]  Multiple falls [R29.6]    Admission Date: 2/17/2022  Expected Discharge Date: 2/23/2022    Discharge Barriers Identified: None    Payor: [employer] MANAGED MEDICARE / Plan: [employer] MEDICARE PLAN PPO / Product Type: Medicare Advantage /     Extended Emergency Contact Information  Primary Emergency Contact: Charan King  Address: [address] [place] [id number] [place]  Mobile Phone: [phone]  Relation: Son  Secondary Emergency Contact: Henry King   [place]  Mobile Phone: [phone]  Relation: Spouse    Discharge Plan A: Home Health  Discharge Plan B: Rehab      [facility]  [place] - [address]  [address] [place] [place] [address]  Phone: [phone] Fax: [phone]    [facility] - [place], [place] - [address] [place] [address]  Phone: [phone] Fax: [phone]      Initial Assessment (most recent)     Adult Discharge Assessment - 02/18/22 1527        Discharge Assessment    Assessment Type Discharge Planning Assessment     Confirmed/corrected address, phone number and insurance Yes     Confirmed Demographics Correct on Facesheet     Source of Information patient     Lives With alone     Do you expect to return to your current living situation? Yes     Do you have help at home or someone to help you manage your care at home? Yes     Who are your caregiver(s) and their phone number(s)? Patient reports she has sitters, one during the day and one at night (24hr coverage). Patient reports she is independent with ADL's at baseline, but sitters are present for any possible help needed.     Current cognitive status: Alert/Oriented     Walking  or Climbing Stairs Difficulty ambulation difficulty, requires equipment     Mobility Management Uses RW or rollator for ambulation     Dressing/Bathing Difficulty none     Home Layout Able to live on 1st floor     Equipment Currently Used at Home oxygen;rollator;walker, rolling     Patient currently being followed by outpatient case management? No     Do you currently have service(s) that help you manage your care at home? Yes     Name and Contact number of agency [facility]     Is the pt/caregiver preference to resume services with current agency Yes     Do you take prescription medications? Yes     Do you have prescription coverage? Yes     Do you have any problems affording any of your prescribed medications? No     Is the patient taking medications as prescribed? yes   per patient report    Who is going to help you get home at discharge? Patient reports she may be able to obtain transportation, unsure at this time. SW encouraged patient to notify SW if patient has issues obtaining transportation at discharge.     How do you get to doctors appointments? family or friend will provide;other (see comments)   Patient reports she uses Ly[employer] for HD appointments    Are you on dialysis? Yes     Dialysis Name and Scheduled days [facility], TTS     Do you take coumadin? No     Discharge Plan A Home Health     Discharge Plan B Rehab     DME Needed Upon Discharge  none     Discharge Plan discussed with: Patient     Discharge Barriers Identified None                02/18/22 1541   Post-Acute Status   Post-Acute Authorization Home Health   Home Health Status Referrals Sent     Poly Costa LMSW  [facility]  Ext. [phone]

## 2022-02-18 NOTE — ASSESSMENT & PLAN NOTE
On home 2L O2, was on 2 L NC in ED. Unclear whether has COPD or not, no PFT's I was able to find in Epic and she is not on any COPD therapies.     - maintain sats 88-92  - Supplemental O2 as needed

## 2022-02-18 NOTE — ASSESSMENT & PLAN NOTE
Patient gets HD TTh S. Presented to ED after missing HD (5-6 days prior).     - Nephro consulted, HD initiated in ED with much improvement in status post HD  - RFP daily  - Avoid nephrotoxins, renally dose meds  - renal diet, low NA

## 2022-02-18 NOTE — PLAN OF CARE
Problem: Adult Inpatient Plan of Care  Goal: Plan of Care Review  Outcome: Ongoing, Progressing  Goal: Patient-Specific Goal (Individualized)  Outcome: Ongoing, Progressing  Goal: Absence of Hospital-Acquired Illness or Injury  Outcome: Ongoing, Progressing  Goal: Optimal Comfort and Wellbeing  Outcome: Ongoing, Progressing  Goal: Readiness for Transition of Care  Outcome: Ongoing, Progressing     Problem: Infection (Hemodialysis)  Goal: Absence of Infection Signs and Symptoms  Outcome: Ongoing, Progressing     Problem: Diabetes Comorbidity  Goal: Blood Glucose Level Within Targeted Range  Outcome: Ongoing, Progressing     Problem: Impaired Wound Healing  Goal: Optimal Wound Healing  Outcome: Ongoing, Progressing    Pt in bed with eyes closed, arouses to verbal stimuli, AOX4, able to voice needs, no distress noted, denies pain, denies SOB, continues on 2L NC, educated on POC, verbalizes understanding, wound care orders placed per WC nurse, ointments applied, vss, educated on call bell, bed locked in lowest position, call bell in reach, instructed to call when assistance needed.

## 2022-02-18 NOTE — ASSESSMENT & PLAN NOTE
Last a1c 5.9, BG on admission 130.     - POCT  - Renal diet, Low NA  - SSI   - At home takes levemir 4 units BID, and aspart 3 TID wm. Will hold for now.

## 2022-02-19 NOTE — PLAN OF CARE
Problem: Adult Inpatient Plan of Care  Goal: Plan of Care Review  Outcome: Ongoing, Progressing  Goal: Patient-Specific Goal (Individualized)  Outcome: Ongoing, Progressing  Goal: Absence of Hospital-Acquired Illness or Injury  Outcome: Ongoing, Progressing  Goal: Optimal Comfort and Wellbeing  Outcome: Ongoing, Progressing  Goal: Readiness for Transition of Care  Outcome: Ongoing, Progressing     Problem: Device-Related Complication Risk (Hemodialysis)  Goal: Safe, Effective Therapy Delivery  Outcome: Ongoing, Progressing     Problem: Hemodynamic Instability (Hemodialysis)  Goal: Effective Tissue Perfusion  Outcome: Ongoing, Progressing     Problem: Infection (Hemodialysis)  Goal: Absence of Infection Signs and Symptoms  Outcome: Ongoing, Progressing     Problem: Diabetes Comorbidity  Goal: Blood Glucose Level Within Targeted Range  Outcome: Ongoing, Progressing     Problem: Impaired Wound Healing  Goal: Optimal Wound Healing  Outcome: Ongoing, Progressing     Problem: Bariatric Environmental Safety  Goal: Safety Maintained with Care  Outcome: Ongoing, Progressing     Problem: Skin Injury Risk Increased  Goal: Skin Health and Integrity  Outcome: Ongoing, Progressing     Problem: Fall Injury Risk  Goal: Absence of Fall and Fall-Related Injury  Outcome: Ongoing, Progressing     Problem: Infection  Goal: Absence of Infection Signs and Symptoms  Outcome: Ongoing, Progressing

## 2022-02-19 NOTE — ASSESSMENT & PLAN NOTE
- Missed HD, BNP 4400 at admit. CXR with pulm edema.   - TTE 02/05/2022  · Severe left atrial enlargement.  · The left ventricle is normal in size with mildly decreased systolic function.  · The estimated ejection fraction is 40%.  · There are segmental left ventricular wall motion abnormalities: apex, apical septum, mid and apical anterolateral, apical inferior and apical anterior wall.  · Grade II left ventricular diastolic dysfunction.  · Normal right ventricular size with normal right ventricular systolic function.  · There is mild aortic valve stenosis.  · Aortic valve area is 1.78 cm2; peak velocity is 0.71 m/s; mean gradient is 6 mmHg.  · Intermediate central venous pressure (8 mmHg).  · The estimated PA systolic pressure is 19 mmHg.  - Fluid removal with HD  - Low salt diet and renal diet   - Fluid restriction of 1500ml daily  - Continue GDMT

## 2022-02-19 NOTE — NURSING
Pt has been off unit at dialysis. Just returned via bed, VSS, AM medications given when returned, MD approved late administrations of medication.

## 2022-02-19 NOTE — ASSESSMENT & PLAN NOTE
- Patient gets HD TTh S. Presented to ED after missing HD (5-6 days prior).   - Nephro consulted, HD initiated in ED with much improvement in status post HD  - RFP daily  - Avoid nephrotoxins, renally dose meds  - renal diet, low NA

## 2022-02-19 NOTE — ASSESSMENT & PLAN NOTE
- Presented 2/5 to ED had initial troponin of 3 then peaked at 7.4. TTE at that time with acute drop in EF from 60-40% with concern for ACS, left heart cath done, LCx with 70% occlusion and drug eluting stent placed 2/8 in Lcx and LAD.   -Per chart review plan was for DAPT x 1 month, then plavix alone (starting 3/8) given that she is also on eliquis  - Continue metoprolol, statin

## 2022-02-19 NOTE — PROGRESS NOTES
[facility]  Progress Note    Patient Name: Kylie King  MRN: [id number]  Patient Class: IP- Inpatient   Admission Date: 2/17/2022  Length of Stay: 2 days  Attending Physician: Willis Grullon MD  Primary Care Provider: Virginia Foote MD    Subjective:     Principal Problem:Encephalopathy, metabolic    HPI:  Kylie King is a 66 y.o. year old female past medical history of CAD s/p PCI on 02/08/2022 (on DAPT), left breast cancer s/p mastectomy, chronic respiratory failure (on home 2L of O2), COPD, ESRD on HD TTS w/ anemia of chronic disease, Atrial Fib (failed DCCV in Nov 2021); on eliquis and amiodarone, CVA, PVD (s/p left foot amputation on Plavix), HTN, T2DM, HLD, who presented to the ED for generalized weakness and multiple falls. Patient was somnolent upon our interview. History was obtained from chart review. She reportedly last received HD due to her debility. Her last HD was about a week ago. Per ED nurse, patient was conversational upon arrival but suddenly became somnolent after about an hour, which is somewhat similar to her presentation during her most recent hospital admission.   Of note, the patient was admitted to hospital medicine on 02/05 for hyperkalemia and hypervolemia in the setting of missed HD, NSTEMI for which she underwent LHC on 02/07 and 02/08 with successful treatment of critical ostial left circumflex disease by doing V stenting of the ostial left main and left circumflex on 02/08. She was discharged with aspirin, plavix and to continue eliquis for Atrial fibrillation with f/u with cardiology. The patient refused HD the day prior to hospital discharge on 12/08.       Upon arrival, she was hemodynamically stable, however was hypertensive. Labs revealed potassium of 7.0. . ABG with pH of 7.3, Co2 49, BNP 4000s. ECG without hyperkalemic changes but revealed atrial fibrillation. CXR appeared grossly same from prior, though not an optimal  study due to rotation. CT Head was unremarkable. CT A/P revealed probable subacute fracture of the right inferior pubic ramus. She was shifted for hyperkalemia and received calcium gluc.  Nephrology was consulted and she was initiated on HD. Patient was admitted to MICU for further management of hyperkalemia and encephalopathy.       Overview/Hospital Course:  Patient was hypertensive at arrival otherwise HDS. Her initial labs revealed a potassium of 7.0. . ABG with pH of 7.3, Co2 49, BNP 4000s. ECG without hyperkalemic changes but revealed atrial fibrillation. CXR appeared grossly same from prior, though not an optimal study due to rotation. CT Head was unremarkable. CT A/P revealed probable subacute fracture of the right inferior pubic ramus. She was shifted for hyperkalemia and received calcium gluc.  Nephrology was consulted and she was initiated on HD while in the ED. She was admitted to MICU for acute encephalopathy and hyperkalemia. Post HD Miss King had improvements in her metabolic abnormalities as well as improvement in her mental status, now alert and oriented by 3. Stepped down to HM 2/18.    Since step down stable. Planning HD 2/19 for hyperkalemia. Appears to be non adherent to diet and meds as per chart review.      Interval History: Stepped down yesterday. No issues overnight. HDS and afebrile. K uptrending, HD happening this AM. Appears to be non adherent to meds and renal diet as per chart review. Re-educated patient importance of taking these meds however she refused as phos binders have been causing her significant diarrhea, she said she tired 5 and now shes tired and will not take any further binders. Otherwise stable. C diff yesterday negative, stopped isolation.     Review of Systems   Constitutional: Negative for chills and fever.   HENT: Negative for trouble swallowing.    Respiratory: Negative for shortness of breath and wheezing.    Cardiovascular: Negative for chest pain,  palpitations and leg swelling.   Gastrointestinal: Negative for abdominal distention, abdominal pain, constipation, diarrhea and nausea.   Genitourinary: Positive for decreased urine volume.   Musculoskeletal: Positive for myalgias (Right sided rib pain x 1 month).   Skin: Negative for rash.   Neurological: Positive for weakness. Negative for headaches.   Psychiatric/Behavioral: Negative for agitation, behavioral problems and confusion.     Objective:     Vital Signs (Most Recent):  Temp: 97.3 °F (36.3 °C) (02/19/22 0815)  Pulse: 73 (02/19/22 1215)  Resp: 16 (02/19/22 0830)  BP: 120/63 (02/19/22 1215)  SpO2: (!) 94 % (02/19/22 0815)   Vital Signs (24h Range):  Temp:  [97.3 °F (36.3 °C)-97.9 °F (36.6 °C)] 97.3 °F (36.3 °C)  Pulse:  [54-73] 73  Resp:  [16-18] 16  SpO2:  [94 %-96 %] 94 %  BP: (120-179)/(59-84) 120/63     Weight: 107.2 kg (236 lb 5.3 oz)  Body mass index is 40.57 kg/m².    Intake/Output Summary (Last 24 hours) at 2/19/2022 1220  Last data filed at 2/19/2022 0400  Gross per 24 hour   Intake 920 ml   Output --   Net 920 ml      Physical Exam  Vitals and nursing note reviewed.   Constitutional:       General: She is not in acute distress.     Appearance: She is obese. She is not diaphoretic.   HENT:      Head: Normocephalic and atraumatic.      Nose: Nose normal.   Eyes:      General: No scleral icterus.  Cardiovascular:      Rate and Rhythm: Normal rate and regular rhythm.      Heart sounds: No murmur heard.  Pulmonary:      Effort: Pulmonary effort is normal. No respiratory distress. Diminished BS bilaterally      Breath sounds: No wheezing.   Abdominal:      General: There is distension.      Palpations: Abdomen is soft.      Tenderness: There is no abdominal tenderness.   Musculoskeletal:         General: Deformity (Left foot amputation at midfoot, right big toe amputation) present. No signs of injury.      Right lower leg: No edema.      Left lower leg: No edema.      Comments: Area of upper medial  breast that was hard, abnormal contour of breast, and scarring present   Skin:     Capillary Refill: Capillary refill takes less than 2 seconds.      Coloration: Skin is not jaundiced.      Findings: Bruising and erythema (Lower extremities brawny bilaterally to mid lower leg) present.   Neurological:      Mental Status: She is alert and oriented to person, place, and time.   Psychiatric:         Mood and Affect: Mood normal.     Significant Labs: All pertinent labs within the past 24 hours have been reviewed.    Significant Imaging: I have reviewed all pertinent imaging results/findings within the past 24 hours.      Assessment/Plan:      * Encephalopathy, metabolic  - Patient with hx of ESRD on dialysis, history of admissions post missing dialysis where she becomes somnolent and improves after HD initiated. CTH without acute intracranial abnormalities making stroke or bleed less likely, no white count and afebrile making infection less likely. Most likely metabolic given missed HD and and elevated BUN greater than 100.   - HD in ED, with improvement in mental status. BUN down to 50 and resolved with HD   - Daily metabolic panel  - phos, Mg daily  - resolved, continue HD as per renal        Pubic ramus fracture  - Seen on CT abdomen pelvis. Has history of frequent falls  - will discuss with ortho, unsure if they were curbsided in the ICU, no notes  - PT/OT    History of non-ST elevation myocardial infarction (NSTEMI)  - Presented 2/5 to ED had initial troponin of 3 then peaked at 7.4. TTE at that time with acute drop in EF from 60-40% with concern for ACS, left heart cath done, LCx with 70% occlusion and drug eluting stent placed 2/8 in Lcx and LAD.   -Per chart review plan was for DAPT x 1 month, then plavix alone (starting 3/8) given that she is also on eliquis  - Continue metoprolol, statin    History of falling  - noted  - PT OT      Dependence on renal dialysis  - see ESRD      Atrial fibrillation  - Follows  with Dr. Ayaan CASAREZ  - continue amiodarone  - continue lopressor  - continue eliquis ([id number] of 7)  - Maintain K>4, Mg>2    Debility  - PT OT      Hyperkalemia  - resolved with HD  - continue HD as per renal      Peripheral artery disease  - resume home meds      COPD (chronic obstructive pulmonary disease)  - On home 2L O2, was on 2 L NC in ED. Unclear whether has COPD or not, no PFT's I was able to find in Epic and she is not on any COPD therapies.   - maintain sats 88-92  - Supplemental O2 as needed    History of colon cancer  - noted      History of cerebrovascular accident  - resume home meds      End-stage renal disease on hemodialysis  - Patient gets HD TTh S. Presented to ED after missing HD (5-6 days prior).   - Nephro consulted, HD initiated in ED with much improvement in status post HD  - RFP daily  - Avoid nephrotoxins, renally dose meds  - renal diet, low NA    Acute on chronic diastolic heart failure  - Missed HD, BNP 4400 at admit. CXR with pulm edema.   - TTE 02/05/2022  · Severe left atrial enlargement.  · The left ventricle is normal in size with mildly decreased systolic function.  · The estimated ejection fraction is 40%.  · There are segmental left ventricular wall motion abnormalities: apex, apical septum, mid and apical anterolateral, apical inferior and apical anterior wall.  · Grade II left ventricular diastolic dysfunction.  · Normal right ventricular size with normal right ventricular systolic function.  · There is mild aortic valve stenosis.  · Aortic valve area is 1.78 cm2; peak velocity is 0.71 m/s; mean gradient is 6 mmHg.  · Intermediate central venous pressure (8 mmHg).  · The estimated PA systolic pressure is 19 mmHg.  - Fluid removal with HD  - Low salt diet and renal diet   - Fluid restriction of 1500ml daily  - Continue GDMT     Elevated troponin  - Troponin elevated to .401 at admission, likely secondary to fluid overload in a patient who missed HD. No ST elevations or  depressions on EKG changes.   - Repeat troponin decreased. No longer trending  - type 2 vs decreased excretion- unable to determine     Morbid obesity  Body mass index is 40.57 kg/m². Morbid obesity complicates all aspects of disease management from diagnostic modalities to treatment. Weight loss encouraged and health benefits explained to patient.         Type II diabetes mellitus  - Last a1c 5.9, BG on admission 130.   - POCT  - Renal diet, Low NA  - SSI   - At home takes levemir 4 units BID, and aspart 3 TID wm. Will hold for now.     Hypertension  - resume home meds and HD      History of intracranial hemorrhage  - noted        VTE Risk Mitigation (From admission, onward)         Ordered     apixaban tablet 2.5 mg  2 times daily         02/17/22 1750     IP VTE HIGH RISK PATIENT  Once         02/17/22 1742     Place sequential compression device  Until discontinued         02/17/22 1742                Discharge Planning   UMAIR: 2/23/2022     Code Status: Prior   Is the patient medically ready for discharge?:     Reason for patient still in hospital (select all that apply): Patient trending condition  Discharge Plan A: Home Health          Willis Grullon MD  Department of Hospital Medicine   [facility] - Telemetry Step[facility]

## 2022-02-19 NOTE — ASSESSMENT & PLAN NOTE
- Patient with hx of ESRD on dialysis, history of admissions post missing dialysis where she becomes somnolent and improves after HD initiated. CTH without acute intracranial abnormalities making stroke or bleed less likely, no white count and afebrile making infection less likely. Most likely metabolic given missed HD and and elevated BUN greater than 100.   - HD in ED, with improvement in mental status. BUN down to 50 and resolved with HD   - Daily metabolic panel  - phos, Mg daily  - resolved, continue HD as per renal

## 2022-02-19 NOTE — ASSESSMENT & PLAN NOTE
- Last a1c 5.9, BG on admission 130.   - POCT  - Renal diet, Low NA  - SSI   - At home takes levemir 4 units BID, and aspart 3 TID wm. Will hold for now.

## 2022-02-19 NOTE — SUBJECTIVE & OBJECTIVE
Interval History: Stepped down yesterday. No issues overnight. HDS and afebrile. K uptrending, HD happening this AM. Appears to be non adherent to meds and renal diet as per chart review. Re-educated patient importance of taking these meds however she refused as phos binders have been causing her significant diarrhea, she said she tired 5 and now shes tired and will not take any further binders. Otherwise stable. C diff yesterday negative, stopped isolation.     Review of Systems   Constitutional: Negative for chills and fever.   HENT: Negative for trouble swallowing.    Respiratory: Negative for shortness of breath and wheezing.    Cardiovascular: Negative for chest pain, palpitations and leg swelling.   Gastrointestinal: Negative for abdominal distention, abdominal pain, constipation, diarrhea and nausea.   Genitourinary: Positive for decreased urine volume.   Musculoskeletal: Positive for myalgias (Right sided rib pain x 1 month).   Skin: Negative for rash.   Neurological: Positive for weakness. Negative for headaches.   Psychiatric/Behavioral: Negative for agitation, behavioral problems and confusion.     Objective:     Vital Signs (Most Recent):  Temp: 97.3 °F (36.3 °C) (02/19/22 0815)  Pulse: 73 (02/19/22 1215)  Resp: 16 (02/19/22 0830)  BP: 120/63 (02/19/22 1215)  SpO2: (!) 94 % (02/19/22 0815)   Vital Signs (24h Range):  Temp:  [97.3 °F (36.3 °C)-97.9 °F (36.6 °C)] 97.3 °F (36.3 °C)  Pulse:  [54-73] 73  Resp:  [16-18] 16  SpO2:  [94 %-96 %] 94 %  BP: (120-179)/(59-84) 120/63     Weight: 107.2 kg (236 lb 5.3 oz)  Body mass index is 40.57 kg/m².    Intake/Output Summary (Last 24 hours) at 2/19/2022 1220  Last data filed at 2/19/2022 0400  Gross per 24 hour   Intake 920 ml   Output --   Net 920 ml      Physical Exam  Vitals and nursing note reviewed.   Constitutional:       General: She is not in acute distress.     Appearance: She is obese. She is not diaphoretic.   HENT:      Head: Normocephalic and  atraumatic.      Nose: Nose normal.   Eyes:      General: No scleral icterus.  Cardiovascular:      Rate and Rhythm: Normal rate and regular rhythm.      Heart sounds: No murmur heard.  Pulmonary:      Effort: Pulmonary effort is normal. No respiratory distress. Diminished BS bilaterally      Breath sounds: No wheezing.   Abdominal:      General: There is distension.      Palpations: Abdomen is soft.      Tenderness: There is no abdominal tenderness.   Musculoskeletal:         General: Deformity (Left foot amputation at midfoot, right big toe amputation) present. No signs of injury.      Right lower leg: No edema.      Left lower leg: No edema.      Comments: Area of upper medial breast that was hard, abnormal contour of breast, and scarring present   Skin:     Capillary Refill: Capillary refill takes less than 2 seconds.      Coloration: Skin is not jaundiced.      Findings: Bruising and erythema (Lower extremities brawny bilaterally to mid lower leg) present.   Neurological:      Mental Status: She is alert and oriented to person, place, and time.   Psychiatric:         Mood and Affect: Mood normal.     Significant Labs: All pertinent labs within the past 24 hours have been reviewed.    Significant Imaging: I have reviewed all pertinent imaging results/findings within the past 24 hours.

## 2022-02-19 NOTE — ASSESSMENT & PLAN NOTE
- Seen on CT abdomen pelvis. Has history of frequent falls  - will discuss with ortho, unsure if they were curbsided in the ICU, no notes  - PT/OT

## 2022-02-19 NOTE — PROGRESS NOTES
[facility]    Kylie King is a 66 y.o. female currently on hemodialysis for removal of uremic toxins and volume.     Patient seen and evaluated on hemodialysis, tolerating treatment, see HD flowsheet for vitals and assessments.    No Hypotension, chest pain, shortness of breath, cramping, nausea or vomiting.      Labs have been reviewed and the dialysate bath has been adjusted.    Labs:      Recent Labs   Lab 02/17/22  1503 02/18/22  0234 02/19/22  0528    137 134*   K 7.0* 5.1 5.6*   CL 97 97 96   CO2 19* 24 21*   * 50* 64*   CREATININE 13.8* 8.4* 10.0*   CALCIUM 9.1 8.1* 8.4*   PHOS  --  5.6* 7.8*       Recent Labs   Lab 02/17/22  1503 02/17/22  1510 02/17/22  1555 02/18/22  0234 02/19/22  0528   WBC 8.74  --   --  4.41 5.45   HGB 9.5*  --   --  8.6* 9.3*   HCT 32.2*   < > 30* 28.6* 29.8*     --   --  167 170    < > = values in this interval not displayed.        Assessment/Plan    Ultrafiltration goal: 3 L as tolerated, keep MAP >65.  - Seen on dialysis today, tolerating session with current UFR, no complications.    - Potassium 5.5, bath adjusted.   - No lab stick/BP intake on access site  - Will continue dialysis treatments while in-patient  - Continue to monitor intake and output, daily weights   - Avoid nephrotoxic medication and renal dose medications to GFR    Anemia of ESRD  - Continue CATY with dialysis treatments  - Hgb 9.3     BMM  - Renal diet with protein intake goal 1.5 g/kg/d  - Novasource with meals  - Phos 7.8, anticipate some clearance with HD, encourage adherence w binders each meal   - Daily renal function panel     Aleja Lerma DNP, APRN, FNP-C  Nephrology Department  Pager:  [phone]

## 2022-02-19 NOTE — ASSESSMENT & PLAN NOTE
Body mass index is 40.57 kg/m². Morbid obesity complicates all aspects of disease management from diagnostic modalities to treatment. Weight loss encouraged and health benefits explained to patient.

## 2022-02-19 NOTE — ASSESSMENT & PLAN NOTE
- Follows with Dr. Ayaan CASAREZ  - continue amiodarone  - continue lopressor  - continue eliquis ([id number] of 7)  - Maintain K>4, Mg>2

## 2022-02-19 NOTE — ASSESSMENT & PLAN NOTE
- On home 2L O2, was on 2 L NC in ED. Unclear whether has COPD or not, no PFT's I was able to find in Epic and she is not on any COPD therapies.   - maintain sats 88-92  - Supplemental O2 as needed

## 2022-02-19 NOTE — ASSESSMENT & PLAN NOTE
- Troponin elevated to .401 at admission, likely secondary to fluid overload in a patient who missed HD. No ST elevations or depressions on EKG changes.   - Repeat troponin decreased. No longer trending  - type 2 vs decreased excretion- unable to determine

## 2022-02-19 NOTE — PLAN OF CARE
Problem: Adult Inpatient Plan of Care  Goal: Plan of Care Review  2/19/2022 0600 by Liza Tapia RN  Outcome: Ongoing, Progressing  2/19/2022 0600 by Liza Tapia RN  Outcome: Ongoing, Progressing  2/18/2022 2356 by Liza Tapia RN  Outcome: Ongoing, Progressing  Goal: Patient-Specific Goal (Individualized)  2/19/2022 0600 by Liza Tapia RN  Outcome: Ongoing, Progressing  2/19/2022 0600 by Liza Tapia RN  Outcome: Ongoing, Progressing  2/18/2022 2356 by Liza Tapia RN  Outcome: Ongoing, Progressing  Goal: Absence of Hospital-Acquired Illness or Injury  2/19/2022 0600 by Liza Tapia RN  Outcome: Ongoing, Progressing  2/19/2022 0600 by Liza Tapia RN  Outcome: Ongoing, Progressing  2/18/2022 2356 by Liza Tapia RN  Outcome: Ongoing, Progressing  Goal: Optimal Comfort and Wellbeing  2/19/2022 0600 by Liza Tapia RN  Outcome: Ongoing, Progressing  2/19/2022 0600 by Liza Tapia RN  Outcome: Ongoing, Progressing  2/18/2022 2356 by Liza Tapia RN  Outcome: Ongoing, Progressing  Goal: Readiness for Transition of Care  2/19/2022 0600 by Liza Tapia RN  Outcome: Ongoing, Progressing  2/19/2022 0600 by Liza Tapia RN  Outcome: Ongoing, Progressing  2/18/2022 2356 by Liza Tapia RN  Outcome: Ongoing, Progressing     Problem: Device-Related Complication Risk (Hemodialysis)  Goal: Safe, Effective Therapy Delivery  2/19/2022 0600 by Liza Tapia RN  Outcome: Ongoing, Progressing  2/19/2022 0600 by Liza Tapia RN  Outcome: Ongoing, Progressing  2/18/2022 2356 by Liza Tapia RN  Outcome: Ongoing, Progressing     Problem: Hemodynamic Instability (Hemodialysis)  Goal: Effective Tissue Perfusion  2/19/2022 0600 by Liza Tapia RN  Outcome: Ongoing, Progressing  2/19/2022 0600 by Liza Tapia RN  Outcome: Ongoing, Progressing  2/18/2022 2356 by Liza Tapia RN  Outcome: Ongoing, Progressing     Problem: Infection (Hemodialysis)  Goal: Absence of Infection  Signs and Symptoms  2/19/2022 0600 by Liza Tapia RN  Outcome: Ongoing, Progressing  2/19/2022 0600 by Liza Tapia RN  Outcome: Ongoing, Progressing  2/18/2022 2356 by Liza Tapia RN  Outcome: Ongoing, Progressing     Problem: Diabetes Comorbidity  Goal: Blood Glucose Level Within Targeted Range  2/19/2022 0600 by Liza Tapia RN  Outcome: Ongoing, Progressing  2/19/2022 0600 by Liza Tapia RN  Outcome: Ongoing, Progressing  2/18/2022 2356 by Liza Tapia RN  Outcome: Ongoing, Progressing     Problem: Impaired Wound Healing  Goal: Optimal Wound Healing  2/19/2022 0600 by Liza Tapia RN  Outcome: Ongoing, Progressing  2/19/2022 0600 by Liza Tapia RN  Outcome: Ongoing, Progressing  2/18/2022 2356 by Liza Tapia RN  Outcome: Ongoing, Progressing     Problem: Bariatric Environmental Safety  Goal: Safety Maintained with Care  2/19/2022 0600 by Liza Tapia RN  Outcome: Ongoing, Progressing  2/19/2022 0600 by Liza Tapia RN  Outcome: Ongoing, Progressing  2/18/2022 2356 by Liza Tapia RN  Outcome: Ongoing, Progressing     Problem: Skin Injury Risk Increased  Goal: Skin Health and Integrity  2/19/2022 0600 by Liza Tapia RN  Outcome: Ongoing, Progressing  2/19/2022 0600 by Liza Tapia RN  Outcome: Ongoing, Progressing  2/18/2022 2356 by Liza Tapia RN  Outcome: Ongoing, Progressing     Problem: Fall Injury Risk  Goal: Absence of Fall and Fall-Related Injury  2/19/2022 0600 by Liza Tapia RN  Outcome: Ongoing, Progressing  2/19/2022 0600 by Liza Tapia RN  Outcome: Ongoing, Progressing  2/18/2022 2356 by Liza Tapia RN  Outcome: Ongoing, Progressing     Problem: Infection  Goal: Absence of Infection Signs and Symptoms  2/19/2022 0600 by Liza Tapia RN  Outcome: Ongoing, Progressing  2/19/2022 0600 by Liza Tapia RN  Outcome: Ongoing, Progressing  2/18/2022 2356 by Liza Tapia RN  Outcome: Ongoing, Progressing

## 2022-02-19 NOTE — HOSPITAL COURSE
Patient was hypertensive at arrival otherwise HDS. Her initial labs revealed a potassium of 7.0. . ABG with pH of 7.3, Co2 49, BNP 4000s. ECG without hyperkalemic changes but revealed atrial fibrillation. CXR appeared grossly same from prior, though not an optimal study due to rotation. CT Head was unremarkable. CT A/P revealed probable subacute fracture of the right inferior pubic ramus. She was shifted for hyperkalemia and received calcium gluc.  Nephrology was consulted and she was initiated on HD while in the ED. She was admitted to MICU for acute encephalopathy and hyperkalemia. Post HD Miss King had improvements in her metabolic abnormalities as well as improvement in her mental status, now alert and oriented by 3. Stepped down to HM 2/18.    Since step down stable. Planning HD 2/19 for hyperkalemia. Appears to be non-adherent to diet and meds as per chart review. She has serial Hds and she is back to baseline. She is ready for dc home with HH and oxygen. She ran out of her oxygen at home about a month ago, and has been w/o oxygen for the past month. Dicussed with SW, working on oxygen. Once provided she can go home with HD TTS.

## 2022-02-19 NOTE — PROGRESS NOTES
HD txt complete.  3L UF removed.  Needles removed & dressing applied.  Surgifoam used due to excessive bleeding.  Pt on 2 blood thinners.       Pt transported back to her room in her bed.  Attempted to reach floor nurse.  No answer. Will continue to try to reach floor nurse.

## 2022-02-20 NOTE — PLAN OF CARE
Problem: Adult Inpatient Plan of Care  Goal: Plan of Care Review  Outcome: Ongoing, Progressing  Goal: Patient-Specific Goal (Individualized)  Outcome: Ongoing, Progressing  Goal: Absence of Hospital-Acquired Illness or Injury  Outcome: Ongoing, Progressing  Goal: Optimal Comfort and Wellbeing  Outcome: Ongoing, Progressing  Goal: Readiness for Transition of Care  Outcome: Ongoing, Progressing     Problem: Diabetes Comorbidity  Goal: Blood Glucose Level Within Targeted Range  Outcome: Ongoing, Progressing     Problem: Impaired Wound Healing  Goal: Optimal Wound Healing  Outcome: Ongoing, Progressing     Problem: Bariatric Environmental Safety  Goal: Safety Maintained with Care  Outcome: Ongoing, Progressing     Problem: Skin Injury Risk Increased  Goal: Skin Health and Integrity  Outcome: Ongoing, Progressing    Pt in bed with eyes open, AOX4, able to voice needs, VSS, FSBS monitored, diarrhea noted, treated per MAR, dialyzed today with 3L removed, refuses sevelmar, educated about the importance of the medication however still refused, refused waffle mattress overlay to reduce risk for skin breakdown, refused heel protectors, educated about the importance of these devices however still refused, noncompliant with diet, educated on call bell, call bell in reach, instructed to call when assistance needed, bed locked in lowest position, [facility]

## 2022-02-20 NOTE — PROGRESS NOTES
Pt Aox4, able to voice needs, denies pain, telemetry removed, IV x2 removed, 3L NC home o2 applied, transferred to , transported via  out front accompanied with staff, left via lyft.

## 2022-02-20 NOTE — PLAN OF CARE
[facility] - Telemetry Stepdown  Discharge Final Note    Primary Care Provider: Virginia Foote MD    Expected Discharge Date: 2/20/2022    Final Discharge Note (most recent)       Final Note - 02/20/22 1443          Final Note    Assessment Type Final Discharge Note (P)      Anticipated Discharge Disposition Home-Health Care Svc (P)    [facility]-NO ([place])       Post-Acute Status    Home Health Status Set-up Complete/Auth obtained (P)      Discharge Delays None known at this time (P)                      Important Message from Medicare             Contact Info       Virginia Foote MD   Specialty: Internal Medicine   Relationship: PCP - General    [address] [facility] [address]   Phone: [phone]       Next Steps: Schedule an appointment as soon as possible for a visit in 1 week(s)    Instructions: Post hospital follow up          O2 tank delivered to bedside by [employer]. No further post acute needs.       Meaghan Kaba LMSW  Case Management Social Worker   [facility] [address]

## 2022-02-20 NOTE — NURSING
END OF SHIFT NOTES: PATIENT RESTING MOST OF THE NIGHT. NO DISTRESS NOTED. NAUSEA RESOLVED. PATIENT TURNED Q 2 HOURS. PT ALERT & ORIENTED X 4. BED IN LOW POSITION & CALL LIGHT WITH IN REACH.

## 2022-02-20 NOTE — DISCHARGE SUMMARY
[facility] - Telemetry [facility] Medicine  Discharge Summary      Patient Name: Kylie King  MRN: [id number]  Patient Class: IP- Inpatient  Admission Date: 2/17/2022  Hospital Length of Stay: 3 days  Discharge Date and Time:  02/20/2022  Attending Physician: Willis Grullon MD   Discharging Provider: Willis Grullon MD  Primary Care Provider: Virginia Foote MD  Hospital Medicine Team: [facility] MED  Willis Grullon MD    HPI:   Kylie King is a 66 y.o. year old female past medical history of CAD s/p PCI on 02/08/2022 (on DAPT), left breast cancer s/p mastectomy, chronic respiratory failure (on home 2L of O2), COPD, ESRD on HD TTS w/ anemia of chronic disease, Atrial Fib (failed DCCV in Nov 2021); on eliquis and amiodarone, CVA, PVD (s/p left foot amputation on Plavix), HTN, T2DM, HLD, who presented to the ED for generalized weakness and multiple falls. Patient was somnolent upon our interview. History was obtained from chart review. She reportedly last received HD due to her debility. Her last HD was about a week ago. Per ED nurse, patient was conversational upon arrival but suddenly became somnolent after about an hour, which is somewhat similar to her presentation during her most recent hospital admission.   Of note, the patient was admitted to hospital medicine on 02/05 for hyperkalemia and hypervolemia in the setting of missed HD, NSTEMI for which she underwent LHC on 02/07 and 02/08 with successful treatment of critical ostial left circumflex disease by doing V stenting of the ostial left main and left circumflex on 02/08. She was discharged with aspirin, plavix and to continue eliquis for Atrial fibrillation with f/u with cardiology. The patient refused HD the day prior to hospital discharge on 12/08.       Upon arrival, she was hemodynamically stable, however was hypertensive. Labs revealed potassium of 7.0. . ABG with pH of 7.3, Co2 49, BNP 4000s. ECG without hyperkalemic changes but  revealed atrial fibrillation. CXR appeared grossly same from prior, though not an optimal study due to rotation. CT Head was unremarkable. CT A/P revealed probable subacute fracture of the right inferior pubic ramus. She was shifted for hyperkalemia and received calcium gluc.  Nephrology was consulted and she was initiated on HD. Patient was admitted to MICU for further management of hyperkalemia and encephalopathy.       * No surgery found *      Hospital Course:   Patient was hypertensive at arrival otherwise HDS. Her initial labs revealed a potassium of 7.0. . ABG with pH of 7.3, Co2 49, BNP 4000s. ECG without hyperkalemic changes but revealed atrial fibrillation. CXR appeared grossly same from prior, though not an optimal study due to rotation. CT Head was unremarkable. CT A/P revealed probable subacute fracture of the right inferior pubic ramus. She was shifted for hyperkalemia and received calcium gluc.  Nephrology was consulted and she was initiated on HD while in the ED. She was admitted to MICU for acute encephalopathy and hyperkalemia. Post HD Miss King had improvements in her metabolic abnormalities as well as improvement in her mental status, now alert and oriented by 3. Stepped down to HM 2/18.    Since step down stable. Planning HD 2/19 for hyperkalemia. Appears to be non-adherent to diet and meds as per chart review. She has serial Hds and she is back to baseline. She is ready for dc home with HH and oxygen. She ran out of her oxygen at home about a month ago, and has been w/o oxygen for the past month. Dicussed with SW, working on oxygen. Once provided she can go home with HD TTS.        Goals of Care Treatment Preferences:  Code Status: Full Code      Consults:   Consults (From admission, onward)        Status Ordering Provider     Inpatient consult to Midline team  Once        Provider:  (Not yet assigned)    Completed GASPER SOLER     Inpatient consult to Critical Care Medicine  Once         Provider:  (Not yet assigned)    Completed ELOY LINDO     Inpatient consult to Nephrology  Once        Provider:  (Not yet assigned)    EDUARDA Cole          No new Assessment & Plan notes have been filed under this hospital service since the last note was generated.  Service: Hospital Medicine    Final Active Diagnoses:    Diagnosis Date Noted POA    PRINCIPAL PROBLEM:  Encephalopathy, metabolic [G93.41] 11/03/2021 Yes    History of non-ST elevation myocardial infarction (NSTEMI) [I25.2] 02/17/2022 Not Applicable    Pubic ramus fracture [S32.599A] 02/17/2022 Yes    History of falling [Z91.81] 12/29/2021 Not Applicable    Dependence on renal dialysis [Z99.2] 12/29/2021 Not Applicable    Atrial fibrillation [I48.91]  Yes    Debility [R53.81] 11/25/2021 Yes    Hyperkalemia [E87.5] 02/15/2021 Yes    Peripheral artery disease [I73.9]  Yes    History of colon cancer [Z85.038] 06/08/2018 Yes    History of cerebrovascular accident [Z86.73] 06/08/2018 Not Applicable     Chronic    COPD (chronic obstructive pulmonary disease) [J44.9] 06/08/2018 Yes     Chronic    End-stage renal disease on hemodialysis [N18.6, Z99.2]  Not Applicable     Chronic    Acute on chronic diastolic heart failure [I50.33] 04/23/2018 Yes    Elevated troponin [R77.8] 04/19/2018 Yes    Morbid obesity [E66.01] 03/31/2017 Yes     Chronic    Type II diabetes mellitus [E11.9] 12/22/2012 Yes     Chronic    Hypertension [I10] 12/21/2012 Yes    History of intracranial hemorrhage [Z86.79] 12/20/2012 Not Applicable     Chronic      Problems Resolved During this Admission:       Discharged Condition: stable    Disposition: Home or Self Care    Follow Up:   Follow-up Information     Virginia Foote MD. Schedule an appointment as soon as possible for a visit in 1 week(s).    Specialty: Internal Medicine  Why: Post hospital follow up  Contact information:  [address] [facility]  [place]  [address]  [phone]                       Patient Instructions:      Ambulatory referral/consult to Outpatient Case Management   Referral Priority: Routine Referral Type: Consultation   Referral Reason: Specialty Services Required   Number of Visits Requested: 1       Significant Diagnostic Studies: all reviewed     Pending Diagnostic Studies:     None         Medications:  Reconciled Home Medications:      Medication List      CONTINUE taking these medications    acetaminophen-codeine 300-30mg 300-30 mg Tab  Commonly known as: TYLENOL #3  Take 1 tablet by mouth every 8 (eight) hours as needed.     albuterol 90 mcg/actuation inhaler  Commonly known as: PROVENTIL/VENTOLIN HFA  Inhale 2 puffs into the lungs every 6 (six) hours as needed for Wheezing. Rescue     amiodarone 200 MG Tab  Commonly known as: PACERONE  Take 1 tablet (200 mg total) by mouth once daily.     apixaban 2.5 mg Tab  Commonly known as: ELIQUIS  Take 1 tablet (2.5 mg total) by mouth 2 (two) times daily.     aspirin 81 MG EC tablet  Commonly known as: ECOTRIN  Take 1 tablet (81 mg total) by mouth once daily. Take until 3/8/22, then stop unless further instructed by provider.     atorvastatin 40 MG tablet  Commonly known as: LIPITOR  Take 1 tablet (40 mg total) by mouth once daily.     blood sugar diagnostic Strp  1 strip by Misc.(Non-Drug; Combo Route) route 4 (four) times daily.     clopidogreL 75 mg tablet  Commonly known as: PLAVIX  Take 1 tablet (75 mg total) by mouth once daily.     DEXCOM G6 SENSOR Umm  Generic drug: blood-glucose sensor  1 each by Misc.(Non-Drug; Combo Route) route every 10 days.     DEXCOM G6 TRANSMITTER Umm  Generic drug: blood-glucose transmitter  1 Device by Misc.(Non-Drug; Combo Route) route continuous.     fish oil-omega-3 fatty acids 300-1,000 mg capsule  Take 1 capsule by mouth every morning.     * FREESTYLE LULU 14 DAY READER Misc  Generic drug: flash glucose scanning reader  Use as directed to check blood sugars      * FREESTYLE LULU 2 READER [facility]  Generic drug: flash glucose scanning reader  1 Units by Misc.(Non-Drug; Combo Route) route continuous prn.     * FREESTYLE LULU 14 DAY SENSOR Kit  Generic drug: flash glucose sensor  Use as directed to check blood sugars     * FREESTYLE LULU 2 SENSOR Kit  Generic drug: flash glucose sensor  1 Units by Misc.(Non-Drug; Combo Route) route every 14 (fourteen) days.     insulin aspart U-100 100 unit/mL (3 mL) Inpn pen  Commonly known as: NovoLOG  Inject 3 Units into the skin 3 (three) times daily with meals.     insulin detemir U-100 100 unit/mL (3 mL) Inpn pen  Commonly known as: Levemir FLEXTOUCH  Inject 4 Units into the skin 2 (two) times daily.     lancets 33 gauge Misc  Commonly known as: ONETOUCH DELICA LANCETS  1 lancet by Misc.(Non-Drug; Combo Route) route 4 (four) times daily before meals and nightly.     methocarbamoL 500 MG Tab  Commonly known as: ROBAXIN  Take 500 mg by mouth 3 (three) times daily.     metoprolol tartrate 25 MG tablet  Commonly known as: LOPRESSOR  Take 1 tablet (25 mg total) by mouth 2 (two) times daily.     nitroGLYCERIN 0.3 MG SL tablet  Commonly known as: NITROSTAT  Place 1 tablet (0.3 mg total) under the tongue every 5 (five) minutes as needed for Chest pain.     senna-docusate 8.6-50 mg 8.6-50 mg per tablet  Commonly known as: PERICOLACE  Take 1 tablet by mouth 2 (two) times daily.     sevelamer carbonate 800 mg Tab  Commonly known as: RENVELA  Take 2 tablets (1,600 mg total) by mouth 3 (three) times daily with meals.         * This list has 4 medication(s) that are the same as other medications prescribed for you. Read the directions carefully, and ask your doctor or other care provider to review them with you.                Indwelling Lines/Drains at time of discharge:   Lines/Drains/Airways     Drain  Duration                Hemodialysis AV Fistula 02/17/22 Right forearm 3 days          Airway  Duration                Airway - Non-Surgical  11/22/21 1308 Nasal Cannula 90 days                Time spent on the discharge of patient: > 30 minutes         Willis Grullon MD  Department of Hospital Medicine  [facility] - Telemetry Stepdown

## 2022-02-20 NOTE — PLAN OF CARE
[facility] - Telemetry Stepdown      HOME HEALTH ORDERS  FACE TO FACE ENCOUNTER    Patient Name: Kylie King  YOB: 1955    PCP: Virginia Foote MD   PCP Address: [address] [facility] / [place] [address]  PCP Phone Number: [phone]  PCP Fax: [phone]    Encounter Date: 2/17/22    Admit to Home Health    Diagnoses:  Active Hospital Problems    Diagnosis  POA    *Encephalopathy, metabolic [G93.41]  Yes    History of non-ST elevation myocardial infarction (NSTEMI) [I25.2]  Not Applicable    Pubic ramus fracture [S32.599A]  Yes    History of falling [Z91.81]  Not Applicable    Dependence on renal dialysis [Z99.2]  Not Applicable    Atrial fibrillation [I48.91]  Yes    Debility [R53.81]  Yes    Hyperkalemia [E87.5]  Yes    Peripheral artery disease [I73.9]  Yes    History of colon cancer [Z85.038]  Yes    History of cerebrovascular accident [Z86.73]  Not Applicable     Chronic    COPD (chronic obstructive pulmonary disease) [J44.9]  Yes     Chronic    End-stage renal disease on hemodialysis [N18.6, Z99.2]  Not Applicable     Chronic    Acute on chronic diastolic heart failure [I50.33]  Yes    Elevated troponin [R77.8]  Yes    Morbid obesity [E66.01]  Yes     Chronic    Type II diabetes mellitus [E11.9]  Yes     Chronic    Hypertension [I10]  Yes    History of intracranial hemorrhage [Z86.79]  Not Applicable     Chronic      Resolved Hospital Problems   No resolved problems to display.       Follow Up Appointments:  Future Appointments   Date Time Provider Department Center   3/14/2022 11:30 AM Virginia Foote MD [facility] [place]   3/15/2022  2:00 PM Virginia Foote MD [facility] [place]   3/30/2022  1:40 PM Teresita Barney MD [facility]       Allergies:  Review of patient's allergies indicates:   Allergen Reactions    Cyclobenzaprine Hallucinations    Erythromycin      Stomach upset    Keflex [cephalexin]      Yeast infection    Erythromycin base       Other reaction(s): upset stomach       Medications: Review discharge medications with patient and family and provide education.    Current Discharge Medication List      CONTINUE these medications which have NOT CHANGED    Details   acetaminophen-codeine 300-30mg (TYLENOL #3) 300-30 mg Tab Take 1 tablet by mouth every 8 (eight) hours as needed.  Qty: 90 tablet, Refills: 1    Comments: Quantity prescribed more than 7 day supply? Yes, quantity medically necessary  Associated Diagnoses: Low back pain, unspecified back pain laterality, unspecified chronicity, unspecified whether sciatica present      albuterol (PROVENTIL/VENTOLIN HFA) 90 mcg/actuation inhaler Inhale 2 puffs into the lungs every 6 (six) hours as needed for Wheezing. Rescue  Qty: 18 g, Refills: 3      amiodarone (PACERONE) 200 MG Tab Take 1 tablet (200 mg total) by mouth once daily.  Qty: 90 tablet, Refills: 3      apixaban (ELIQUIS) 2.5 mg Tab Take 1 tablet (2.5 mg total) by mouth 2 (two) times daily.  Qty: 60 tablet, Refills: 1      aspirin (ECOTRIN) 81 MG EC tablet Take 1 tablet (81 mg total) by mouth once daily. Take until 3/8/22, then stop unless further instructed by provider.  Qty: 30 tablet, Refills: 0      atorvastatin (LIPITOR) 40 MG tablet Take 1 tablet (40 mg total) by mouth once daily.  Qty: 90 tablet, Refills: 3      blood sugar diagnostic Strp 1 strip by Misc.(Non-Drug; Combo Route) route 4 (four) times daily.  Qty: 150 strip, Refills: 3    Comments: Test strips for patient's current glucometer.      blood-glucose sensor (DEXCOM G6 SENSOR) Umm 1 each by Misc.(Non-Drug; Combo Route) route every 10 days.  Qty: 3 each, Refills: 11      blood-glucose transmitter (DEXCOM G6 TRANSMITTER) Umm 1 Device by Misc.(Non-Drug; Combo Route) route continuous.  Qty: 1 Device, Refills: 3      clopidogreL (PLAVIX) 75 mg tablet Take 1 tablet (75 mg total) by mouth once daily.  Qty: 90 tablet, Refills: 3      fish oil-omega-3 fatty acids 300-1,000 mg  capsule Take 1 capsule by mouth every morning.      !! flash glucose scanning reader (FREESTYLE LULU 14 DAY READER) Misc Use as directed to check blood sugars  Qty: 2 each, Refills: 11    Comments: 30 day supply  Associated Diagnoses: Type 2 diabetes mellitus with chronic kidney disease on chronic dialysis, with long-term current use of insulin      !! flash glucose scanning reader (FREESTYLE LULU 2 READER) Misc 1 Units by Misc.(Non-Drug; Combo Route) route continuous prn.  Qty: 1 each, Refills: 0    Associated Diagnoses: Type 2 diabetes mellitus with chronic kidney disease on chronic dialysis, with long-term current use of insulin      !! flash glucose sensor (FREESTYLE LULU 14 DAY SENSOR) Kit Use as directed to check blood sugars  Qty: 2 kit, Refills: 11    Comments: 30 day supply  Associated Diagnoses: Type 2 diabetes mellitus with chronic kidney disease on chronic dialysis, with long-term current use of insulin      !! flash glucose sensor (FREESTYLE LULU 2 SENSOR) Kit 1 Units by Misc.(Non-Drug; Combo Route) route every 14 (fourteen) days.  Qty: 2 kit, Refills: 11    Associated Diagnoses: Type 2 diabetes mellitus with chronic kidney disease on chronic dialysis, with long-term current use of insulin      insulin aspart U-100 (NOVOLOG) 100 unit/mL (3 mL) InPn pen Inject 3 Units into the skin 3 (three) times daily with meals.  Qty: 5.4 mL, Refills: 0      insulin detemir U-100 (LEVEMIR FLEXTOUCH) 100 unit/mL (3 mL) SubQ InPn pen Inject 4 Units into the skin 2 (two) times daily.  Qty: 4.8 mL, Refills: 0      lancets (ONETOUCH DELICA LANCETS) 33 gauge Misc 1 lancet by Misc.(Non-Drug; Combo Route) route 4 (four) times daily before meals and nightly.  Qty: 400 each, Refills: 4      methocarbamoL (ROBAXIN) 500 MG Tab Take 500 mg by mouth 3 (three) times daily.      metoprolol tartrate (LOPRESSOR) 25 MG tablet Take 1 tablet (25 mg total) by mouth 2 (two) times daily.  Qty: 60 tablet, Refills: 2    Comments: .       nitroGLYCERIN (NITROSTAT) 0.3 MG SL tablet Place 1 tablet (0.3 mg total) under the tongue every 5 (five) minutes as needed for Chest pain.  Qty: 15 tablet, Refills: 0      senna-docusate 8.6-50 mg (PERICOLACE) 8.6-50 mg per tablet Take 1 tablet by mouth 2 (two) times daily.      sevelamer carbonate (RENVELA) 800 mg Tab Take 2 tablets (1,600 mg total) by mouth 3 (three) times daily with meals.  Qty: 180 tablet, Refills: 11       !! - Potential duplicate medications found. Please discuss with provider.      STOP taking these medications       famotidine (PEPCID) 20 MG tablet Comments:   Reason for Stopping:         gabapentin (NEURONTIN) 300 MG capsule Comments:   Reason for Stopping:                 I have seen and examined this patient within the last 30 days. My clinical findings that support the need for the home health skilled services and home bound status are the following:no   Weakness/numbness causing balance and gait disturbance due to Weakness/Debility making it taxing to leave home.     Diet:   renal diet    Labs: As per renal and PCP    Referrals/ Consults  Physical Therapy to evaluate and treat. Evaluate for home safety and equipment needs; Establish/upgrade home exercise program. Perform / instruct on therapeutic exercises, gait training, transfer training, and Range of Motion.  Occupational Therapy to evaluate and treat. Evaluate home environment for safety and equipment needs. Perform/Instruct on transfers, ADL training, ROM, and therapeutic exercises.   to evaluate for community resources/long-range planning.  Aide to provide assistance with personal care, ADLs, and vital signs.    Activities:   activity as tolerated    Nursing:   Agency to admit patient within 24 hours of hospital discharge unless specified on physician order or at patient request    SN to complete comprehensive assessment including routine vital signs. Instruct on disease process and s/s of complications to report to  MD. Review/verify medication list sent home with the patient at time of discharge  and instruct patient/caregiver as needed. Frequency may be adjusted depending on start of care date.     Skilled nurse to perform up to 3 visits PRN for symptoms related to diagnosis    Notify MD if SBP > 160 or < 90; DBP > 90 or < 50; HR > 120 or < 50; Temp > 101; O2 < 88%; Other:       Ok to schedule additional visits based on staff availability and patient request on consecutive days within the home health episode.    When multiple disciplines ordered:    Start of Care occurs on Sunday - Wednesday schedule remaining discipline evaluations as ordered on separate consecutive days following the start of care.    Thursday SOC -schedule subsequent evaluations Friday and Monday the following week.     Friday - Saturday SOC - schedule subsequent discipline evaluations on consecutive days starting Monday of the following week.    For all post-discharge communication and subsequent orders please contact patient's primary care physician. If unable to reach primary care physician or do not receive response within 30 minutes, please contact hospitalist on call for clinical staff order clarification    Miscellaneous   Home Oxygen:  Oxygen at 2 L/min nasal canula to be used:  Continuously.    Home Health Aide:  Nursing Three times weekly, Physical Therapy Three times weekly, Occupational Therapy Three times weekly, Medical Social Work Three times weekly, Respiratory Therapy Three times weekly and Home Health Aide Three times weekly    Wound Care Orders  no    I certify that this patient is confined to her home and needs physical therapy and occupational therapy.

## 2022-02-21 NOTE — TELEPHONE ENCOUNTER
----- Message from Robyn Steele LPN sent at 2/21/2022  1:15 PM CST -----  Patient states that she needs a refill on Robaxin 500 mg. Please contact patient to discuss as soon as possible.        Respectfully,  Robyn Steele LPN  [facility]    [email or web address]       Please do not reply to this message, as this inbox is not routinely monitored.

## 2022-02-22 NOTE — PT/OT/SLP DISCHARGE
Occupational Therapy Discharge Summary    Kylie King  MRN: [id number]   Principal Problem: Encephalopathy, metabolic      Patient Discharged from acute Occupational Therapy on 2/22/2022  .  Please refer to prior OT note dated 2/18/22 for functional status.    Assessment:      Patient appropriate for care in another setting.    Objective:     GOALS:   Multidisciplinary Problems     Occupational Therapy Goals        Problem: Occupational Therapy Goal    Goal Priority Disciplines Outcome Interventions   Occupational Therapy Goal     OT, PT/OT Ongoing, Progressing    Description: Goals to be met by: 7 days 2/25/22     Patient will increase functional independence with ADLs by performing:    Pt to complete UE dressing with set-up  Pt to complete LE dressing with RONA    Pt to complete toileting with SBA  Pt to complete g/h skills in standing with SBA  Pt to complete t/f to bed, chair and commode with SBA               Pt seen for OT eval only; thus, goals not yet achieved upon hospital d/c.     Reasons for Discontinuation of Therapy Services  Transfer to alternate level of care.      Plan:     Patient Discharged to: Home with Home Health Service    2/22/2022

## 2022-03-02 NOTE — TELEPHONE ENCOUNTER
----- Message from Iesha Moraes sent at 3/2/2022  2:18 PM CST -----  Contact: Vira [phone]  Vira kimbrough is calling in regards to a fax they sent over for the pt and is asking if this has been received she sent this over last week she also left a message please advise and give  return call

## 2022-03-03 PROBLEM — I21.4 NSTEMI (NON-ST ELEVATED MYOCARDIAL INFARCTION): Chronic | Status: ACTIVE | Noted: 2022-01-01

## 2022-03-03 PROBLEM — Z91.81 HISTORY OF FALLING: Chronic | Status: ACTIVE | Noted: 2021-01-01

## 2022-03-03 NOTE — TELEPHONE ENCOUNTER
----- Message from Perla Ovalles sent at 3/3/2022 11:54 AM CST -----  Contact: Vira Shaikh [phone]  Vira kimbrough is calling in regards to a fax they sent over for the pt and is asking if this has been received she sent this over last week she also left a message please advise and give  return call

## 2022-03-04 NOTE — ED NOTES
Pt had an incontient episode of stool. Pt turned and cleaned x2 ED staff. Pt tolerated well.    Unknown if ever smoked

## 2022-03-04 NOTE — ASSESSMENT & PLAN NOTE
-hold home regimen  -starting insulin at home dose as she is not hyperglycemic. detemir 4 units bid, aspart 3 units tidwm, low dose SSI  -q4h accuchecks  -diabetic diet

## 2022-03-04 NOTE — ASSESSMENT & PLAN NOTE
Patient with Paroxysmal (<7 days) atrial fibrillation which is controlled currently with Beta Blocker and Amiodarone. Patient is currently in sinus rhythm.[id number] Score: 4. Anticoagulation indicated. Anticoagulation done with eliquis 2.5 mg qd.

## 2022-03-04 NOTE — ED NOTES
I-STAT Chem-8+ Results:   Value Reference Range   Sodium 133 136-145 mmol/L   Potassium  8.2 3.5-5.1 mmol/L   Chloride 99  mmol/L   Ionized Calcium 0.98 1.06-1.42 mmol/L   CO2 (measured) 24 23-29 mmol/L   Glucose 133  mg/dL    6-30 mg/dL   Creatinine 12.0 0.5-1.4 mg/dL   Hematocrit 40 36-54%

## 2022-03-04 NOTE — SUBJECTIVE & OBJECTIVE
Past Medical History:   Diagnosis Date    Anxiety     Breast cancer     left    Carotid artery disease     Cataract     Choledocholithiasis     s/p ERCP and stent placement    Chronic diastolic CHF (congestive heart failure)     Chronic obstructive pulmonary disease     Chronic venous insufficiency     Depression     End-stage renal disease on hemodialysis     M/W/F    GERD (gastroesophageal reflux disease)     History of breast cancer     History of colon cancer 2001    s/p sigmoid colectomy    History of kidney stones     History of osteomyelitis of left foot     History of pancreatitis     History of stroke     Hyperlipidemia     Hypertension     Intracerebral hemorrhage     Lumbar degenerative disc disease     Lumbar spinal stenosis     Morbid obesity     Paroxysmal atrial fibrillation     Peripheral artery disease     Peripheral neuropathy     Tobacco dependence     Type II diabetes mellitus     Urinary incontinence        Past Surgical History:   Procedure Laterality Date    ANGIOGRAM, CORONARY, WITH LEFT HEART CATHETERIZATION N/A 2/7/2022    Procedure: Angiogram, Coronary, with Left Heart Cath;  Surgeon: Adam Rutherford MD;  Location: [facility] CATH LAB;  Service: Cardiology;  Laterality: N/A;    ANGIOGRAPHY OF LOWER EXTREMITY Right 9/17/2020    Procedure: Angiogram Extremity Unilateral;  Surgeon: RICK Pollard III, MD;  Location: [facility] OR [address] [place] [address];  Service: Peripheral Vascular;  Laterality: Right;  6.2 minutes of fluro  690.88 mGy  112.64 Gycm2  55 ml    ANGIOGRAPHY OF LOWER EXTREMITY Left 5/13/2021    Procedure: Angiogram Extremity Unilateral;  Surgeon: HOMERO Hayden II, MD;  Location: [facility] OR [address] [place] [address];  Service: Vascular;  Laterality: Left;  35.1 min  971.41 mGy  190.27 Gy.cm  101ml Dye    ANGIOGRAPHY OF LOWER EXTREMITY Right 7/19/2021    Procedure: Angiogram Extremity Unilateral;  Surgeon: HOMERO Hayden II, MD;  Location: [address];  Service: Vascular;  Laterality: Right;  3.0 min  121.00  mGy  26.8713 Gy.cm  13ml Dye    AORTOGRAPHY N/A 5/13/2021    Procedure: AORTOGRAM;  Surgeon: HOMERO Hayden II, MD;  [facility];  Service: Vascular;  Laterality: N/A;    AV FISTULA PLACEMENT Right 5/28/2018    Procedure: CREATION -FISTULA-AV;  Surgeon: RICK Pollard III, MD;  [facility];  Service: Peripheral Vascular;  Laterality: Right;    BREAST BIOPSY  1/2012    left breast invasive mammary carcinoma (lateral bx) and intraductal papilloma (medial bx)    CARDIOVERSION  11/22/2021    CARDIOVERSION  11/22/2021    Procedure: Cardioversion;  Surgeon: Ad Garcia MD;  Location: [facility] CATH LAB;  Service: Cardiology;;    CATARACT EXTRACTION W/  INTRAOCULAR LENS IMPLANT Right 1/24/2019        CATARACT EXTRACTION W/  INTRAOCULAR LENS IMPLANT Left 1/31/2019    Procedure: EXTRACTION, CATARACT, WITH IOL INSERTION;  Surgeon: Immanuel Moncada MD;  Location: [facility];  Service: Ophthalmology;  Laterality: Left;    CHOLECYSTECTOMY  2001    CORONARY ANGIOGRAPHY N/A 2/8/2022    Procedure: ANGIOGRAM, CORONARY ARTERY;  Surgeon: Abelardo Betancur MD;  Location: [facility] CATH LAB;  Service: Cardiology;  Laterality: N/A;    DEBRIDEMENT OF FOOT Right 2/17/2021    Procedure: DEBRIDEMENT, FOOT right plantar ulcer;  Surgeon: Sonja Corral DPM;  Location: [facility];  Service: Podiatry;  Laterality: Right;    ERCP W/ SPHICTEROTOMY      FINE NEEDLE ASPIRATION  1999    Right breast    FOOT AMPUTATION Left 6/1/2021    Procedure: Transmetatarsal amputation;  Surgeon: Billy Robles DPM;  [facility];  Service: Podiatry;  Laterality: Left;    FOOT AMPUTATION Left 7/23/2021    Procedure: AMPUTATION, FOOT;  Surgeon: Billy Robles DPM;  [facility]: [facility];  Service: Podiatry;  Laterality: Left;    FOOT AMPUTATION THROUGH METATARSAL Right 10/15/2020    Procedure: PARTIAL RAY AMPUTATION GREAT TOE;  Surgeon: Billy Robles DPM;  Location: [facility];  Service: Podiatry;  Laterality:  Right;  Stretcher OK    HERNIA REPAIR      LAPAROSCOPIC NEPHRECTOMY Left 2011    MASTECTOMY  2013    left    OOPHORECTOMY Left 2001    REVISION OF ARTERIOVENOUS FISTULA Right 8/15/2018    Procedure: REVISION, AV FISTULA;  Surgeon: RICK Pollard III, MD;  Location: [facility] OR [address] [place] [address];  Service: Peripheral Vascular;  Laterality: Right;    SIGMOIDECTOMY  2001    SURGICAL REMOVAL OF METATARSAL HEAD Right 2/17/2021    Procedure: OSTECTOMY, METATARSAL BONE, diatal 1st;  Surgeon: Sonja Corral DPM;  Location: [facility] OR;  Service: Podiatry;  Laterality: Right;    TOE AMPUTATION Left 5/13/2021    Procedure: AMPUTATION, TOE;  Surgeon: OHMERO Hayden II, MD;  Location: [facility] OR 2ND FLR;  Service: Vascular;  Laterality: Left;    TOTAL REDUCTION MAMMOPLASTY Right 2013       Review of patient's allergies indicates:   Allergen Reactions    Cyclobenzaprine Hallucinations    Erythromycin      Stomach upset    Keflex [cephalexin]      Yeast infection    Erythromycin base      Other reaction(s): upset stomach     Current Facility-Administered Medications   Medication Frequency    acetaminophen tablet 650 mg Q4H PRN    albuterol-ipratropium 2.5 mg-0.5 mg/3 mL nebulizer solution 3 mL Q4H PRN    amiodarone tablet 200 mg Daily    atorvastatin tablet 40 mg Daily    bisacodyL suppository 10 mg Daily PRN    dextrose 10% bolus 125 mL PRN    dextrose 10% bolus 250 mL PRN    glucagon (human recombinant) injection 1 mg PRN    glucose chewable tablet 16 g PRN    glucose chewable tablet 24 g PRN    insulin aspart U-100 pen 0-5 Units QID (AC + HS) PRN    insulin aspart U-100 pen 3 Units TIDWM    insulin detemir U-100 pen 4 Units BID    melatonin tablet 6 mg Nightly PRN    methocarbamoL tablet 500 mg TID    metoprolol tartrate (LOPRESSOR) tablet 25 mg BID    naloxone 0.4 mg/mL injection 0.02 mg PRN    omega 3-dha-epa-fish oil capsule 1 capsule QAM    ondansetron disintegrating tablet 8 mg Q8H PRN    polyethylene glycol packet 17 g Daily PRN     promethazine tablet 25 mg Q6H PRN    sevelamer carbonate tablet 1,600 mg TID WM    sodium chloride 0.9% bolus 250 mL PRN    sodium chloride 0.9% flush 10 mL Q8H PRN     Current Outpatient Medications   Medication    acetaminophen-codeine 300-30mg (TYLENOL #3) 300-30 mg Tab    albuterol (PROVENTIL/VENTOLIN HFA) 90 mcg/actuation inhaler    amiodarone (PACERONE) 200 MG Tab    apixaban (ELIQUIS) 2.5 mg Tab    aspirin (ECOTRIN) 81 MG EC tablet    atorvastatin (LIPITOR) 40 MG tablet    blood sugar diagnostic Strp    blood-glucose sensor (DEXCOM G6 SENSOR) Umm    blood-glucose transmitter (DEXCOM G6 TRANSMITTER) Umm    clopidogreL (PLAVIX) 75 mg tablet    fish oil-omega-3 fatty acids 300-1,000 mg capsule    flash glucose scanning reader (FREESTYLE LULU 14 DAY READER) Misc    flash glucose scanning reader (FREESTYLE LULU 2 READER) Misc    flash glucose sensor (FREESTYLE LULU 14 DAY SENSOR) Kit    flash glucose sensor (FREESTYLE LULU 2 SENSOR) Kit    insulin aspart U-100 (NOVOLOG) 100 unit/mL (3 mL) InPn pen    insulin detemir U-100 (LEVEMIR FLEXTOUCH) 100 unit/mL (3 mL) SubQ InPn pen    lancets (ONETOUCH DELICA LANCETS) 33 gauge Misc    methocarbamoL (ROBAXIN) 500 MG Tab    metoprolol tartrate (LOPRESSOR) 25 MG tablet    nitroGLYCERIN (NITROSTAT) 0.3 MG SL tablet    senna-docusate 8.6-50 mg (PERICOLACE) 8.6-50 mg per tablet    sevelamer carbonate (RENVELA) 800 mg Tab     Family History       Problem Relation (Age of Onset)    Arthritis Mother    Breast cancer Maternal Grandmother    Cancer Maternal Grandmother, Maternal Aunt    Celiac disease Sister    Diabetes Father    Heart disease Mother, Father, Maternal Grandmother          Tobacco Use    Smoking status: Current Some Day Smoker     Packs/day: 0.50     Years: 28.00     Pack years: 14.00     Types: Cigarettes     Start date: 1990     Last attempt to quit: 2018     Years since quittin.1    Smokeless tobacco: Never Used    Tobacco comment: anxious    Substance and Sexual Activity    Alcohol use: No    Drug use: No    Sexual activity: Not Currently     Partners: Male     Review of Systems  Objective:     Vital Signs (Most Recent):  Temp: 98 °F (36.7 °C) (03/04/22 0712)  Pulse: 70 (03/04/22 1301)  Resp: 18 (03/04/22 1301)  BP: 91/64 (03/04/22 1101)  SpO2: 96 % (03/04/22 1301)  O2 Device (Oxygen Therapy): nasal cannula (03/04/22 0645) Vital Signs (24h Range):  Temp:  [97.7 °F (36.5 °C)-98 °F (36.7 °C)] 98 °F (36.7 °C)  Pulse:  [] 70  Resp:  [17-26] 18  SpO2:  [84 %-100 %] 96 %  BP: ()/(51-90) 91/64     Weight: 107 kg (236 lb) (03/03/22 2301)  Body mass index is 40.51 kg/m².  Body surface area is 2.2 meters squared.    I/O last 3 completed shifts:  In: 800 [Other:800]  Out: 3800 [Other:3800]    Physical Exam  Vitals and nursing note reviewed.   Constitutional:       General: She is not in acute distress.     Appearance: Normal appearance. She is ill-appearing. She is not toxic-appearing or diaphoretic.   Cardiovascular:      Rate and Rhythm: Normal rate and regular rhythm.      Pulses: Normal pulses.      Heart sounds: Murmur heard.   Pulmonary:      Effort: Pulmonary effort is normal. No respiratory distress.      Breath sounds: Rales present. No wheezing or rhonchi.      Comments: Decreased breath sounds and + crackles  Abdominal:      General: Abdomen is flat. Bowel sounds are normal. There is no distension.      Palpations: Abdomen is soft. There is no mass.      Tenderness: There is no abdominal tenderness. There is no guarding or rebound.   Genitourinary:     Comments: Brown loose stools noted on pad   Musculoskeletal:         General: Swelling and deformity present. No tenderness.      Right lower leg: No edema.      Left lower leg: No edema.      Comments: L foot amputation   Skin:     General: Skin is warm.      Capillary Refill: Capillary refill takes 2 to 3 seconds.      Coloration: Skin is not jaundiced or pale.      Findings: Lesion and  rash present. No bruising or erythema.      Comments: Venous congestion maximilian LEs, no open wounds noted   Neurological:      Mental Status: She is alert.      Comments: Oriented x2 self and place    Psychiatric:      Comments: Sleepy, anxious at times        Significant Labs:  ABGs: No results for input(s): PH, PCO2, HCO3, POCSATURATED, BE in the last 168 hours.  Cardiac Markers: No results for input(s): CKMB, TROPONINT, MYOGLOBIN in the last 168 hours.  CBC:   Recent Labs   Lab 03/04/22  0009 03/04/22  0033 03/04/22  0521   WBC 6.67  --   --    RBC 4.24  --   --    HGB 12.0  --   --    HCT 39.3   < > 34*     --   --    MCV 93  --   --    MCH 28.3  --   --    MCHC 30.5*  --   --     < > = values in this interval not displayed.     CMP:   Recent Labs   Lab 03/04/22  0009 03/04/22  1042 03/04/22  1445   * 84 134*   CALCIUM 8.7 8.2* 8.2*   ALBUMIN 3.9 3.0*  --    PROT 8.1  --   --    * 135* 138   K 8.4* 5.0 5.6*   CO2 19* 27 25   CL 95 95 95   BUN 94* 44* 44*   CREATININE 11.9* 7.4* 7.2*   ALKPHOS 120  --   --    ALT 21  --   --    AST 33  --   --    BILITOT 1.0  --   --      Coagulation: No results for input(s): PT, INR, APTT in the last 168 hours.  Microbiology Results (last 7 days)       ** No results found for the last 168 hours. **          PTH: No results for input(s): PTH in the last 168 hours.  All labs within the past 24 hours have been reviewed.    Significant Imaging:  Labs: Reviewed  X-Ray: Reviewed   Graft Cartilage Fenestration Text: The cartilage was fenestrated with a 2mm punch biopsy to help facilitate graft survival and healing.

## 2022-03-04 NOTE — PROGRESS NOTES
HD complete. Lines clamped. 15g needles x2 removed from R AVF. Post treatment bleeding time >30 min. Sites held until hemostasis achieved and bandaged with tape and gauze.  NET UF 3000 mL.  Report given to primary nurse.

## 2022-03-04 NOTE — ASSESSMENT & PLAN NOTE
"Psychiatry Initial Visit (PhD/LCSW)  Diagnostic Interview - CPT 35904    Date: 2017    Site: [facility]    Referral source: Dr. [facility]     Clinical status of patient: Outpatient    William Bueno, a 64 y.o. male, for initial evaluation visit.  Met with patient.    Chief complaint/reason for encounter: depression and anxiety    History of present illness: 64 year old male patient presents to the clinic today for initial visit with chief complaint of depression and anxiety.  He is tearful intermittently today, as he reflects on recent losses he has sustained.  His spouse  a year and a half ago, due to heart failure.  He viewed her as his "soulmate", and reflects on how much he misses her.  His brother  last year after being in hospice care (brother lived in [place]).  This loss has also been difficult for patient to adjust to.  He endorses symptoms of depression and anxiety: depressed mood, recent weight loss (has lost 30 pounds), diminished interest, insomnia, fatigue, tearfulness, social isolation, excessive anxiety/worry, and restlessness/keyed up.  Dr. Tesfaye recently prescribed him Zoloft, which patient notes has helped some with feelings of depression and anxiety.  He feels less anxious; his sleep has improved.  Reports that he turned to unhealthy behaviors following the recent deaths in his family.  He was gambling compulsively.  Reports drinking more alcohol than he is accustomed to drinking.  Patient reports past history of crack cocaine use that he is currently in remission from.  He has been working lately on cutting out these behaviors.  Works as a .  Reports that his shan has been helping him cope with recent losses in his life.  Reports that he saw a psychiatrist once in the past in the 90s.  He is amenable to a referral to psychiatry.       Pain: noncontributory    Symptoms:   · Mood: depressed mood, diminished interest, weight loss, insomnia, fatigue, " Hyperphosphatemia  -continue sevelamer 800 tidwm   tearfulness and social isolation  · Anxiety: excessive anxiety/worry and restlessness/keyed up  · Substance abuse: in remission from crack cocaine use; recent increase in alcohol use           · Cognitive functioning: denied  · Health behaviors: noncontributory    Psychiatric history: psychotropic management by PCP    Medical history: high blood pressure     Family history of psychiatric illness: none    Social history (marriage, employment, etc.): Patient is .  Once .  One son (31), one daughter (37).  Patient's parents are .  Eight sisters; one brother is living, one is .  High school graduate.  Patient works as a  for [employer].  Ang[employer]an.  Denies history of physical/sexual abuse.  -was in the [employer].         Substance use:   Alcohol: typically, would drink a six pack a week-abstaining currently    Drugs: prior crack cocaine abuse-in remission     Tobacco: 1/2 ppd    Caffeine: infrequent-coffee     Current medications and drug reactions (include OTC, herbal): see medication list     Strengths and liabilities: Strength: Patient accepts guidance/feedback, Strength: Patient is expressive/articulate., Strength: Patient is motivated for change., Strength: Patient has reasonable judgment., Liability: Patient lacks coping skills.    Current Evaluation:     Mental Status Exam:  General Appearance:  unremarkable, age appropriate   Speech: normal tone, normal rate, normal pitch, normal volume      Level of Cooperation: cooperative      Thought Processes: normal and logical   Mood: sad      Thought Content: normal, no suicidality, no homicidality, delusions, or paranoia   Affect: appropriate   Orientation: Oriented x3   Memory: recent >  intact, remote >  intact   Attention Span & Concentration: intact   Fund of General Knowledge: intact and appropriate to age and level of education   Abstract Reasoning: not assessed    Judgment & Insight: intact      Language  intact     Diagnostic Impression - Plan:       ICD-10-CM ICD-9-CM   1. Adjustment disorder with mixed anxiety and depressed mood F43.23 309.28       Plan:individual psychotherapy and consult psychiatrist for medication evaluation    Return to Clinic: as scheduled    Length of Service (minutes): 45

## 2022-03-04 NOTE — ED NOTES
Assumed care of patient. Patient on continuous cardiac monitor, automatic blood pressure cuff and continuous pulse oximeter. VSS on 2 L NC. Asleep with easy arousal. Pt resting comfortably in stretcher. Respirations even and unlabored. Side rails up and bed in lowest position. Call light in reach. Instructed patient to call staff for assistance with mobility. Will continue to monitor.

## 2022-03-04 NOTE — HPI
Kylie King is a 66 y.o. year old female with PMHx significant for CAD s/p PCI on 02/08/2022 (on DAPT), left breast cancer s/p mastectomy, chronic respiratory failure (on home 2L of O2), COPD, ESRD on HD TTS w/ anemia of chronic disease, Atrial Fib (failed DCCV in Nov 2021); on eliquis and amiodarone, CVA, PVD (s/p left foot amputation on Plavix), HTN, T2DM, HLD admitted to observation for shortness of breath 2/2 volume overload. Patient reports that she started feeling extremely short of breath with associated chest tightness x 2 days ago. Reports compliance with home oxygen. Her last HD session was on 2/26. She reports that she got tangle her bed sheets and fell out of bed. Endorses head trauma, but is unsure if she lost consciousness. She also complains of RUQ aching abdominal pain that does not radiate for the last 2 days. Denies fever/chills, HA, cough, n/v, diarrhea, constipation, changes in appetite.     In the ED, patient was tachypneic to max 26. O2 sats >94% on 3-4L NC. Remaining vitals stable. K 8.4 (hemolyzed). BNP 4691 (elevated from baseline). Troponin I 0.529. CXR with cardiomegaly and coarsened interstitial lung markings. CTH, CT C spine, CT abd/pelvis pending. Given duoneb x 3, calcium gluconate x1, insulin 5 units x1, sodium bicard 50mEq x1, dextrose 10% 250 mL x1. Nephrology consulted, patient dialyzed this morning.

## 2022-03-04 NOTE — ED NOTES
Attempted to give morning meds and Pt lethargic and having difficulty staying awake. MD made aware. Will continue to monitor and hold morning medications at this time.

## 2022-03-04 NOTE — HPI
"Kylie King is a 66 y.o. female w/ PMHx ESKD 2/2 DM II, HTN, and L hydronephrosis on HD TTS, Diabetic neuropathy s/p L foot amputation, COPD on 2L O2 at home, colon CA, Afib, Hyperlipidemia, ?CVA, anemia, hyperthyroidism, COVID (2021), venous insufficiency, PAD, MDD, vitamin D deficiency, and obesity admitted on 3/3/2022 for shortness of breath and s/p fall at home    Pt states that she was feeling confused about her dialysis treatment schedule d/t Bart Zhong, she thought the day of admission was Wednesday (when it was really Thursday) and so she was planning on going to dialysis the next day. Pt's caregiver went to check on her yesterday (3/3) evening and asked the pt about how the dialysis treatment went and it was then when pt realized she was confused and had in fact missed her dialysis treatment. Pt has been short of breath for the past couple of days despite using her O2 (2L NC)and does think that her confusion has been going on for the past couple of days as well. Her last HD treatment was 2/26 (per pt)    Pt states that she has been having loose stools x2 days, she has not taken any medication for this, pt denies any fever, chills, chest pain, n/v/abd pain, and/or headaches.    Of note, pt was recently hospitalized (2/17-2/20/202) for generalized weakness and falls, had also missed dialysis) and prior to that on 2/5 /2022 for hyperkalemia d/t missed dialysis.   In the ED she presented with the following vitals 97.9, P 78, 150/68 and 100% on 2L . As for her labs, her K was 8, bicarb 19, and BUN 94, sCr 11.9, troponin 0.529, BNP 4691, hgb 12, WBC 6.67, plts 178, and her CXR showed findings of pulmonary edema along with known cardiomegaly      Nephrology was consulted for: "Emergent dialysis"    Outpatient HD Information:  -Dialysis modality: Hemodialysis  -Outpatient HD [facility]  -Nephrologist: Dr. Kong   -HD TX days: Tuesday/Thursday/Saturday, duration of treatment: 4  -Last HD TX " prior to hospital admission: 02/26/2022 (per pt)   -Dialysis access: RUE AV fistula  -Residual urine: Anuric   -EDW: 100kg

## 2022-03-04 NOTE — ASSESSMENT & PLAN NOTE
ESKD 2/2 DM, HT, and L hydronephrosis     Outpatient HD Information:  -Dialysis modality: Hemodialysis  -Outpatient HD unit: [facility]  -Nephrologist: Dr. Kong   -HD TX days: Tuesday/Thursday/Saturday, duration of treatment: 4  -Last HD TX prior to hospital admission: 02/26/2022 (per pt)   -Dialysis access: RUE AV fistula  -Residual urine: Anuric   -EDW: 100kg         Plan/Recommendations:   -iHD received emergent HD today (03/04/2022) with UF -3L , she will need another HD treatment tomorrow 3/5 (her usual Saturday HD treatment)   -I strongly recommend a social work//psych consult to assist pt in making decision about her plan for discharge home vs SNF since pt appears not to be able to manage her own care, has missed many dialysis sessions in the last 3 months. Pt has help once a day/not routinely and those who help her come by at night time.   -Electrolytes: hyperkalemia 8-->5.6, repeat labs in AM   -Mineral & Bone Disorder:   Phosphorus 6.9, PTH level ordered   -ESRD anemia: hgb 12, goal hgb 10-11g/dL,ESAs not indicated, transfuse if hgb <7  -renal function panel in AM  -Renal diet and protein supplement, if not NPO   -Strict I/O's and daily weights

## 2022-03-04 NOTE — PROGRESS NOTES
HD tx started vis 15g needles x2. Lines connected and secure,. Orders verified.    03/04/22 0145   Pre-Hemodialysis Assessment   Dialysate Na (mEq/L) 138   Dialysate K (mEq/L) 2   Dialysate CA (mEq/L) 2.5   Dialysate HCO3 (mEq/L) 30   Prime Ordered (mL) 250 mL   Net UF Goal 3000   Total UF Goal 3800   Pre-Hemodialysis Comments see note   UF Rate 1270

## 2022-03-04 NOTE — ASSESSMENT & PLAN NOTE
-on admission 8.4 in setting of ESRD  -shifted w/ albuterol x 3, calcium gluconate x1, insulin 5 units x1, improved to 5  -continue to monitor

## 2022-03-04 NOTE — ED PROVIDER NOTES
Encounter Date: 3/3/2022       History     Chief Complaint   Patient presents with    Shortness of Breath     Was not wearing home O2. Last dialysis on Saturday. Also fall at home yesterday     Ms. King is a 66 y.o. year old female past medical history of CAD s/p PCI on 02/08/2022 (on DAPT), left breast cancer s/p mastectomy, chronic respiratory failure (on home 2L of O2), COPD, ESRD on HD TTS w/ anemia of chronic disease, Atrial Fib (failed DCCV in Nov 2021); on eliquis and amiodarone, CVA, PVD (s/p left foot amputation on Plavix), HTN, T2DM, HLD who presents to the emergency department due to shortness of breath.  She states that since yesterday morning she has been feeling extremely short of breath she also has felt that her chest is tight she was struggling despite using oxygen at home and so she came to emergency department for evaluation she also states that yesterday afternoon she tripped and fell she believes she hit her head but is unsure if she had any loss of consciousness she denies any vomiting.  Patient also states that for the past 2 days she has had pain especially in her right upper quadrant she describes the pain as an aching sensation that does not radiate.  She denies any fever, chills, nausea, vomiting or chest pain.         Review of patient's allergies indicates:   Allergen Reactions    Cyclobenzaprine Hallucinations    Erythromycin      Stomach upset    Keflex [cephalexin]      Yeast infection    Erythromycin base      Other reaction(s): upset stomach     Past Medical History:   Diagnosis Date    Anxiety     Breast cancer     left    Carotid artery disease     Cataract     Choledocholithiasis     s/p ERCP and stent placement    Chronic diastolic CHF (congestive heart failure)     Chronic obstructive pulmonary disease     Chronic venous insufficiency     Depression     End-stage renal disease on hemodialysis     M/W/F    GERD (gastroesophageal reflux disease)     History  of breast cancer     History of colon cancer 2001    s/p sigmoid colectomy    History of kidney stones     History of osteomyelitis of left foot     History of pancreatitis     History of stroke     Hyperlipidemia     Hypertension     Intracerebral hemorrhage     Lumbar degenerative disc disease     Lumbar spinal stenosis     Morbid obesity     Paroxysmal atrial fibrillation     Peripheral artery disease     Peripheral neuropathy     Tobacco dependence     Type II diabetes mellitus     Urinary incontinence      Past Surgical History:   Procedure Laterality Date    ANGIOGRAM, CORONARY, WITH LEFT HEART CATHETERIZATION N/A 2/7/2022    Procedure: Angiogram, Coronary, with Left Heart Cath;  Surgeon: Adam Rutherford MD;  Location: [facility] CATH LAB;  Service: Cardiology;  Laterality: N/A;    ANGIOGRAPHY OF LOWER EXTREMITY Right 9/17/2020    Procedure: Angiogram Extremity Unilateral;  Surgeon: RICK Pollard III, MD;  Location: [facility] OR [address] [place] [address];  Service: Peripheral Vascular;  Laterality: Right;  6.2 minutes of fluro  690.88 mGy  112.64 Gycm2  55 ml    ANGIOGRAPHY OF LOWER EXTREMITY Left 5/13/2021    Procedure: Angiogram Extremity Unilateral;  Surgeon: HOMERO Hayden II, MD;  Location: [facility] OR [address] [place] [address];  Service: Vascular;  Laterality: Left;  35.1 min  971.41 mGy  190.27 Gy.cm  101ml Dye    ANGIOGRAPHY OF LOWER EXTREMITY Right 7/19/2021    Procedure: Angiogram Extremity Unilateral;  Surgeon: HOMERO Hayden II, MD;  Location: [facility] OR [address] [place] [address];  Service: Vascular;  Laterality: Right;  3.0 min  121.00 mGy  26.8713 Gy.cm  13ml Dye    AORTOGRAPHY N/A 5/13/2021    Procedure: AORTOGRAM;  Surgeon: HOMERO Hayden II, MD;  [facility];  Service: Vascular;  Laterality: N/A;    AV FISTULA PLACEMENT Right 5/28/2018    Procedure: CREATION -FISTULA-AV;  Surgeon: RICK Pollard III, MD;  Location: [facility] OR [address] [place] [address];  Service: Peripheral Vascular;  Laterality: Right;    BREAST BIOPSY  1/2012    left  breast invasive mammary carcinoma (lateral bx) and intraductal papilloma (medial bx)    CARDIOVERSION  11/22/2021    CARDIOVERSION  11/22/2021    Procedure: Cardioversion;  Surgeon: Ad Garcia MD;  Location: [facility] CATH LAB;  Service: Cardiology;;    CATARACT EXTRACTION W/  INTRAOCULAR LENS IMPLANT Right 1/24/2019        CATARACT EXTRACTION W/  INTRAOCULAR LENS IMPLANT Left 1/31/2019    Procedure: EXTRACTION, CATARACT, WITH IOL INSERTION;  Surgeon: Immanuel Moncada MD;  Location: [facility];  Service: Ophthalmology;  Laterality: Left;    CHOLECYSTECTOMY  2001    CORONARY ANGIOGRAPHY N/A 2/8/2022    Procedure: ANGIOGRAM, CORONARY ARTERY;  Surgeon: Abelardo Betancur MD;  Location: [facility] CATH LAB;  Service: Cardiology;  Laterality: N/A;    DEBRIDEMENT OF FOOT Right 2/17/2021    Procedure: DEBRIDEMENT, FOOT right plantar ulcer;  Surgeon: Sonja Corral DPM;  Location: [facility];  Service: Podiatry;  Laterality: Right;    ERCP W/ SPHICTEROTOMY      FINE NEEDLE ASPIRATION  1999    Right breast    FOOT AMPUTATION Left 6/1/2021    Procedure: Transmetatarsal amputation;  Surgeon: Billy Robles DPM;  [address]  Service: Podiatry;  Laterality: Left;    FOOT AMPUTATION Left 7/23/2021    Procedure: AMPUTATION, FOOT;  Surgeon: Billy Robles DPM;  [facility];  Service: Podiatry;  Laterality: Left;    FOOT AMPUTATION THROUGH METATARSAL Right 10/15/2020    Procedure: PARTIAL RAY AMPUTATION GREAT TOE;  Surgeon: Billy Robles DPM;  [facility];  Service: Podiatry;  Laterality: Right;  Stretcher OK    HERNIA REPAIR      LAPAROSCOPIC NEPHRECTOMY Left 2011    MASTECTOMY  2013    left    OOPHORECTOMY Left 2001    REVISION OF ARTERIOVENOUS FISTULA Right 8/15/2018    Procedure: REVISION, AV FISTULA;  Surgeon: RICK Pollard III, MD;  Location: [address];  Service: Peripheral Vascular;  Laterality: Right;    SIGMOIDECTOMY  2001    SURGICAL REMOVAL OF METATARSAL  HEAD Right 2021    Procedure: OSTECTOMY, METATARSAL BONE, diatal 1st;  Surgeon: Sonja Corral DPM;  Location[facility];  Service: Podiatry;  Laterality: Right;    TOE AMPUTATION Left 2021    Procedure: AMPUTATION, TOE;  Surgeon: HOMERO Hayden II, MD;  [facility]: [facility] OR [facility];  Service: Vascular;  Laterality: Left;    TOTAL REDUCTION MAMMOPLASTY Right      Family History   Problem Relation Age of Onset    Arthritis Mother     Heart disease Mother     Diabetes Father     Heart disease Father     Celiac disease Sister     Breast cancer Maternal Grandmother         possible-  patient unsure    Cancer Maternal Grandmother     Heart disease Maternal Grandmother     Cancer Maternal Aunt     Ovarian cancer Neg Hx     Glaucoma Neg Hx     Macular degeneration Neg Hx      Social History     Tobacco Use    Smoking status: Current Some Day Smoker     Packs/day: 0.50     Years: 28.00     Pack years: 14.00     Types: Cigarettes     Start date: 1990     Last attempt to quit: 2018     Years since quittin.1    Smokeless tobacco: Never Used    Tobacco comment: anxious   Substance Use Topics    Alcohol use: No    Drug use: No     Review of Systems   Constitutional: Positive for fatigue. Negative for activity change, appetite change, chills, fever and unexpected weight change.   HENT: Negative for congestion, rhinorrhea, sinus pressure, sinus pain, trouble swallowing and voice change.    Eyes: Negative for redness and visual disturbance.   Respiratory: Positive for chest tightness and shortness of breath. Negative for cough, wheezing and stridor.    Cardiovascular: Negative for chest pain, palpitations and leg swelling.   Gastrointestinal: Positive for abdominal pain. Negative for diarrhea, nausea and vomiting.   Genitourinary: Negative for difficulty urinating, dysuria, flank pain, hematuria, menstrual problem, pelvic pain and urgency.   Musculoskeletal: Negative for arthralgias, back  pain, joint swelling, myalgias and neck pain.   Skin: Negative for color change and wound.   Neurological: Negative for dizziness, seizures, weakness, light-headedness, numbness and headaches.   Psychiatric/Behavioral: Negative for agitation and confusion.       Physical Exam     Initial Vitals [03/03/22 2301]   BP Pulse Resp Temp SpO2   (!) 150/68 78 18 97.9 °F (36.6 °C) 100 %      MAP       --         Physical Exam    Nursing note and vitals reviewed.  Constitutional: She appears well-developed and well-nourished. She is not diaphoretic. No distress.   HENT:   Head: Normocephalic and atraumatic.   Mouth/Throat: Oropharynx is clear and moist. No oropharyngeal exudate.   Eyes: Conjunctivae and EOM are normal. Pupils are equal, round, and reactive to light. Right eye exhibits no discharge. Left eye exhibits no discharge. No scleral icterus.   Neck: No thyromegaly present. No JVD present.   Normal range of motion.  Cardiovascular: Normal rate and normal heart sounds. Exam reveals no gallop and no friction rub.    No murmur heard.  Pulmonary/Chest: No stridor. No respiratory distress. She has wheezes. She exhibits no tenderness.   Belly breathing noted on exam   Abdominal: Abdomen is soft. Bowel sounds are normal. She exhibits no distension. There is abdominal tenderness.   Right upper quadrant tenderness There is no guarding.   Musculoskeletal:         General: No tenderness or edema. Normal range of motion.      Cervical back: Normal range of motion.     Neurological: She is alert and oriented to person, place, and time. She has normal strength. No sensory deficit.   Skin: Skin is warm. Capillary refill takes less than 2 seconds. No erythema.   Psychiatric: She has a normal mood and affect.         ED Course   Procedures  Labs Reviewed   CBC W/ AUTO DIFFERENTIAL - Abnormal; Notable for the following components:       Result Value    MCHC 30.5 (*)     RDW 20.2 (*)     Lymph # 0.7 (*)     Gran % 84.5 (*)     Lymph %  9.7 (*)     All other components within normal limits   COMPREHENSIVE METABOLIC PANEL - Abnormal; Notable for the following components:    Sodium 135 (*)     Potassium 8.4 (*)     CO2 19 (*)     Glucose 132 (*)     BUN 94 (*)     Creatinine 11.9 (*)     Anion Gap 21 (*)     eGFR if  3.4 (*)     eGFR if non  2.9 (*)     All other components within normal limits    Narrative:        critical result(s) called and verbal readback obtained from   vicky warren rn by WPDARNELL 03/04/2022 01:07   TROPONIN I - Abnormal; Notable for the following components:    Troponin I 0.529 (*)     All other components within normal limits   B-TYPE NATRIURETIC PEPTIDE - Abnormal; Notable for the following components:    BNP 4,691 (*)     All other components within normal limits   TROPONIN I - Abnormal; Notable for the following components:    Troponin I 0.391 (*)     All other components within normal limits   RENAL FUNCTION PANEL - Abnormal; Notable for the following components:    Sodium 135 (*)     BUN 44 (*)     Calcium 8.2 (*)     Creatinine 7.4 (*)     Albumin 3.0 (*)     Phosphorus 6.9 (*)     eGFR if  6.0 (*)     eGFR if non  5.2 (*)     All other components within normal limits   BASIC METABOLIC PANEL - Abnormal; Notable for the following components:    Potassium 5.6 (*)     Glucose 134 (*)     BUN 44 (*)     Creatinine 7.2 (*)     Calcium 8.2 (*)     Anion Gap 18 (*)     eGFR if  6.2 (*)     eGFR if non  5.4 (*)     All other components within normal limits   ISTAT PROCEDURE - Abnormal; Notable for the following components:    POC Glucose 133 (*)     POC  (*)     POC Creatinine 12.0 (*)     POC Sodium 133 (*)     POC Potassium 8.2 (*)     POC Ionized Calcium 0.98 (*)     All other components within normal limits   ISTAT PROCEDURE - Abnormal; Notable for the following components:    POC BUN 37 (*)     POC Creatinine 5.9 (*)     POC  Ionized Calcium 1.00 (*)     POC Hematocrit 34 (*)     All other components within normal limits   POCT GLUCOSE - Abnormal; Notable for the following components:    POCT Glucose 167 (*)     All other components within normal limits   LIPASE   URINALYSIS, REFLEX TO URINE CULTURE   SARS-COV-2 RDRP GENE   POCT GLUCOSE, HAND-HELD DEVICE   POCT GLUCOSE, HAND-HELD DEVICE   POCT GLUCOSE, HAND-HELD DEVICE   POCT GLUCOSE, HAND-HELD DEVICE   POCT GLUCOSE   ISTAT CHEM8   ISTAT CHEM8        ECG Results          EKG 12-lead (Final result)  Result time 03/04/22 22:08:13    Final result by Interface, [facility] [place] (03/04/22 22:08:13)                 Narrative:    Test Reason : R06.02,    Vent. Rate : 067 BPM     Atrial Rate : 077 BPM     P-R Int : 000 ms          QRS Dur : 146 ms      QT Int : 496 ms       P-R-T Axes : 000 038 -71 degrees     QTc Int : 524 ms    Atrial fibrillation with a competing junctional pacemaker  Incomplete left bundle branch block  Abnormal ECG  When compared with ECG of 17-FEB-2022 13:48,  No significant change was found    Confirmed by Gilles Hopper MD ([id number]) on 3/4/2022 10:08:05 PM    Referred By: [email or web address]   SELF           Confirmed By:Gilles Hopper MD                            Imaging Results          CT Abdomen Pelvis With Contrast (Final result)  Result time 03/04/22 06:58:39    Final result by Rajiv Velazquez MD (03/04/22 06:58:39)                 Impression:      1. No detrimental change or acute process seen within the abdomen or pelvis.  2. Slightly improved bibasilar mild dependent atelectasis.  3. Hepatosplenomegaly.  4. Status post remote nephrectomy.  Multiple stable prominent to mildly enlarged periaortic and left inguinal lymph nodes.  No mass or fluid collection within the nephrectomy bed.  5. Grossly stable bilateral adrenal nodules.  6. Slight interval progressed healing of subacute right inferior pubic ramus fracture.  7. Grossly stable additional findings as above.      Electronically  signed by: Rajiv Velazquez MD  Date:    03/04/2022  Time:    06:58             Narrative:    EXAMINATION:  CT ABDOMEN PELVIS WITH CONTRAST    CLINICAL HISTORY:  Abdominal pain, acute, nonlocalized;    TECHNIQUE:  Low dose axial images, sagittal and coronal reformations were obtained from the lung bases to the pubic symphysis following the IV administration of 100 mL of Omnipaque 350 .  Oral contrast was not given.    COMPARISON:  CT abdomen and pelvis 02/17/2022 and abdominal radiograph 02/18/2022; MRI pelvis 08/14/2019, renal ultrasound 04/20/2018, abdominal ultrasound 03/20/2018    FINDINGS:  Patient is rotated and tilted within the scanner.  Beam hardening with streak artifact from overlying monitoring leads and adjacent upper extremities somewhat limits evaluation.    Base of the heart appears mildly enlarged with multi-vessel coronary arterial calcifications but no significant pericardial fluid.  Partially imaged left breast implant again noted.    Imaged lung bases show mild dependent atelectasis slightly improved from prior with scattered areas of platelike scarring versus atelectasis.  No new consolidation or pleural effusion seen.    Liver is mildly enlarged without focal parenchymal lesion seen.  Remote cholecystectomy.  Grossly stable dilated common bile duct.  No significant intrahepatic biliary ductal dilatation.  Spleen is mildly enlarged without focal process seen.  Grossly stable bilateral adrenal small nodules.  Pancreas, stomach and duodenum are within normal limits.    Right kidney is atrophic consistent with end-stage renal disease, unchanged.  No hydronephrosis.  Grossly stable nonspecific right perinephric stranding.  Grossly stable subcentimeter hypoattenuating parenchymal foci which are too small to characterize but likely represent cysts.  No large radiodense nephrolith.  Status post left nephrectomy.  No new soft tissue density mass or fluid collection within the nephrectomy bed.    Grossly  stable multiple prominent to mildly enlarged periaortic lymph nodes, more so on the left side.  No enlarged mesenteric or right inguinal lymph nodes by CT criteria.  Grossly stable prominent to mildly enlarged left inguinal lymph nodes.    Urinary bladder is suboptimally distended with circumferential wall thickening.  Uterus and bilateral adnexal regions are grossly stable.  Pelvic phleboliths noted.  No significant amount of free fluid seen in the pelvis.    No evidence of bowel obstruction or inflammation.  No pneumatosis or portal venous gas.    There is trace volume free fluid about the inferior right hepatic lobe, unchanged.  No intraperitoneal free air.    Severe calcific atherosclerosis of the aorta and its branch vessels.  No aortic aneurysm or dissection.    Moderate diffuse body wall edema not significantly changed.    Slight interval healing of known right inferior pubic ramus fracture.  No new acute displaced fracture-dislocation or destructive osseous process seen.  Overall osseous structures appear stable elsewhere.                               CT Head Without Contrast (Final result)  Result time 03/04/22 07:07:13    Final result by Rajiv Velazquez MD (03/04/22 07:07:13)                 Impression:      No acute intracranial hemorrhage or recent major vascular territory infarct.    No acute fracture or traumatic malalignment of the cervical spine.    Remote lacunar-type infarcts of the right basal ganglia and thalamus.    Cerebral volume loss and findings suggestive of moderate chronic microvascular ischemic change.    Degenerative changes of the cervical spine.  Up to severe neural foraminal narrowing at C3-C4 and C4-C5.  No high-grade spinal canal stenosis.    Electronically signed by resident: Bobby Montanez  Date:    03/04/2022  Time:    06:32    Electronically signed by: Rajiv Velazquez MD  Date:    03/04/2022  Time:    07:07             Narrative:    EXAMINATION:  CT HEAD WITHOUT CONTRAST; CT  CERVICAL SPINE WITHOUT CONTRAST    CLINICAL HISTORY:  Head trauma, minor (Age >= 65y);; Neck trauma (Age >= 65y);    TECHNIQUE:  Low dose axial CT images obtained throughout the head and cervical spine without intravenous contrast. Sagittal and coronal reconstructions were performed.    COMPARISON:  CT head 02/17/2022; CT cervical spine 07/13/2021    FINDINGS:  Mild cerebral volume loss.  Ventricles are stable in size and configuration.  No evidence of acute hydrocephalus.  No extra-axial blood or fluid collections.    Unchanged right basal ganglia and thalamus remote lacunar type infarcts.  Periventricular white matter hypoattenuation while nonspecific compatible with moderate chronic microvascular ischemic change.  No parenchymal mass, hemorrhage, edema or major vascular distribution infarct.    No displaced calvarial fracture.  Skull base calcific atherosclerosis.  Mastoid air cells and visualized paranasal sinuses are essentially clear.    Generalized osteopenia.  Craniocervical junction is intact.  Moderate degenerative changes at the atlantodental interval.  Pre dens space is maintained.  Generalized osteopenic changes of the cervical spine.  No acute fracture or dislocation.  There is straightening of the cervical lordosis.  Near or total osseous fusion of the bilateral facets at C3-C4.  Minimal anterolisthesis C4 on C5, C5 on C6, and T1 on T2 approximally 2 mm.  Vertebral body heights are stable.  Mild multilevel intervertebral disc height loss and degenerative changes.    C2-C3: Facet joint arthropathy greater on the left contributing to mild left neural foraminal narrowing..    C3-C4: Facet joint arthropathy contributing to severe left and moderate right neural foraminal narrowing.    C4-C5: Facet joint arthropathy greater on the right and posterior disc osteophyte complex contributing to severe right and mild left neural foraminal narrowing.    C5-C6: No significant neural foraminal narrowing or spinal  canal stenosis.    C6-C7: Posterior disc osteophyte complex resulting in effacement the anterior thecal sac.    C7-T1: Facet joint arthropathy greater on the right.  No significant neural foraminal or spinal canal stenosis.    Miscellaneous: Motion artifact limits evaluation of the lung parenchyma however no large focal consolidation.  Moderate calcific atherosclerosis of the aortic arch and carotid bifurcations.  Thyroid is unremarkable.  No cervical lymphadenopathy.                               CT Cervical Spine Without Contrast (Final result)  Result time 03/04/22 07:07:13    Final result by Rajiv Velazquez MD (03/04/22 07:07:13)                 Impression:      No acute intracranial hemorrhage or recent major vascular territory infarct.    No acute fracture or traumatic malalignment of the cervical spine.    Remote lacunar-type infarcts of the right basal ganglia and thalamus.    Cerebral volume loss and findings suggestive of moderate chronic microvascular ischemic change.    Degenerative changes of the cervical spine.  Up to severe neural foraminal narrowing at C3-C4 and C4-C5.  No high-grade spinal canal stenosis.    Electronically signed by resident: Bobby Montanez  Date:    03/04/2022  Time:    06:32    Electronically signed by: Rajiv Velazquez MD  Date:    03/04/2022  Time:    07:07             Narrative:    EXAMINATION:  CT HEAD WITHOUT CONTRAST; CT CERVICAL SPINE WITHOUT CONTRAST    CLINICAL HISTORY:  Head trauma, minor (Age >= 65y);; Neck trauma (Age >= 65y);    TECHNIQUE:  Low dose axial CT images obtained throughout the head and cervical spine without intravenous contrast. Sagittal and coronal reconstructions were performed.    COMPARISON:  CT head 02/17/2022; CT cervical spine 07/13/2021    FINDINGS:  Mild cerebral volume loss.  Ventricles are stable in size and configuration.  No evidence of acute hydrocephalus.  No extra-axial blood or fluid collections.    Unchanged right basal ganglia and thalamus  remote lacunar type infarcts.  Periventricular white matter hypoattenuation while nonspecific compatible with moderate chronic microvascular ischemic change.  No parenchymal mass, hemorrhage, edema or major vascular distribution infarct.    No displaced calvarial fracture.  Skull base calcific atherosclerosis.  Mastoid air cells and visualized paranasal sinuses are essentially clear.    Generalized osteopenia.  Craniocervical junction is intact.  Moderate degenerative changes at the atlantodental interval.  Pre dens space is maintained.  Generalized osteopenic changes of the cervical spine.  No acute fracture or dislocation.  There is straightening of the cervical lordosis.  Near or total osseous fusion of the bilateral facets at C3-C4.  Minimal anterolisthesis C4 on C5, C5 on C6, and T1 on T2 approximally 2 mm.  Vertebral body heights are stable.  Mild multilevel intervertebral disc height loss and degenerative changes.    C2-C3: Facet joint arthropathy greater on the left contributing to mild left neural foraminal narrowing..    C3-C4: Facet joint arthropathy contributing to severe left and moderate right neural foraminal narrowing.    C4-C5: Facet joint arthropathy greater on the right and posterior disc osteophyte complex contributing to severe right and mild left neural foraminal narrowing.    C5-C6: No significant neural foraminal narrowing or spinal canal stenosis.    C6-C7: Posterior disc osteophyte complex resulting in effacement the anterior thecal sac.    C7-T1: Facet joint arthropathy greater on the right.  No significant neural foraminal or spinal canal stenosis.    Miscellaneous: Motion artifact limits evaluation of the lung parenchyma however no large focal consolidation.  Moderate calcific atherosclerosis of the aortic arch and carotid bifurcations.  Thyroid is unremarkable.  No cervical lymphadenopathy.                               X-Ray Chest 1 View (Final result)  Result time 03/04/22 00:31:19     "Final result by Dirk Anderson MD (03/04/22 00:31:19)                 Impression:      Cardiomegaly and coarsened interstitial lung markings which could be related to interstitial edema or chronic change.  No detrimental change in lung aeration when compared with 02/17/2022.      Electronically signed by: Dirk Anderson MD  Date:    03/04/2022  Time:    00:31             Narrative:    EXAMINATION:  XR CHEST 1 VIEW    CLINICAL HISTORY:  Provided history is "shortness of breath;  ".    TECHNIQUE:  One view of the chest.    COMPARISON:  02/17/2022.    FINDINGS:  Cardiac wires overlie the chest.  Patient is rotated.  Cardiomediastinal silhouette is enlarged and similar to the prior study.  Atherosclerotic calcifications overlie the aortic arch.  There is increased attenuation overlying the left mid and lower lung zone, similar to the prior study, potentially exaggerated by unilateral left-sided breast prosthesis.  Underlying infectious/inflammatory process or interstitial edema not excluded.  There are persistent coarsened bilateral interstitial lung markings, not significantly changed when compared with the prior study.  No large pleural effusion.  No pneumothorax.                                 Medications   sodium chloride 0.9% flush 10 mL (has no administration in time range)   albuterol-ipratropium 2.5 mg-0.5 mg/3 mL nebulizer solution 3 mL (has no administration in time range)   melatonin tablet 6 mg (has no administration in time range)   polyethylene glycol packet 17 g (has no administration in time range)   bisacodyL suppository 10 mg (has no administration in time range)   acetaminophen tablet 650 mg (has no administration in time range)   naloxone 0.4 mg/mL injection 0.02 mg (has no administration in time range)   glucose chewable tablet 16 g (has no administration in time range)   glucose chewable tablet 24 g (has no administration in time range)   glucagon (human recombinant) injection 1 mg (has no " administration in time range)   ondansetron disintegrating tablet 8 mg (has no administration in time range)   promethazine tablet 25 mg (has no administration in time range)   dextrose 10% bolus 125 mL (has no administration in time range)   dextrose 10% bolus 250 mL (has no administration in time range)   sevelamer carbonate tablet 1,600 mg (1,600 mg Oral Not Given 3/4/22 1715)   metoprolol tartrate (LOPRESSOR) tablet 25 mg (25 mg Oral Given 3/4/22 2120)   methocarbamoL tablet 500 mg (500 mg Oral Given 3/4/22 2119)   omega 3-dha-epa-fish oil capsule 1 capsule (1 capsule Oral Not Given 3/4/22 0815)   atorvastatin tablet 40 mg (40 mg Oral Given 3/4/22 1052)   amiodarone tablet 200 mg (200 mg Oral Given 3/4/22 1052)   insulin aspart U-100 pen 0-5 Units (has no administration in time range)   insulin detemir U-100 pen 4 Units (4 Units Subcutaneous Given 3/4/22 2120)   insulin aspart U-100 pen 3 Units (3 Units Subcutaneous Given 3/4/22 1657)   0.9%  NaCl infusion (has no administration in time range)   sodium chloride 0.9% bolus 250 mL (has no administration in time range)   heparin (porcine) injection 1,000 Units (has no administration in time range)   Lactobacillus rhamnosus GG capsule 1 capsule (1 capsule Oral Given 3/4/22 2120)   albuterol-ipratropium 2.5 mg-0.5 mg/3 mL nebulizer solution 3 mL (3 mLs Nebulization Given 3/4/22 0022)   insulin regular injection 5 Units (5 Units Intravenous Given 3/4/22 0102)   albuterol sulfate nebulizer solution 15 mg (15 mg Nebulization Given 3/4/22 0051)   calcium gluconate 1g in dextrose 5% 100mL (ready to mix system) (0 g Intravenous Stopped 3/4/22 0201)   sodium bicarbonate solution 50 mEq (50 mEq Intravenous Given 3/4/22 0102)   dextrose 10% bolus 250 mL (0 mLs Intravenous Stopped 3/4/22 0108)   0.9%  NaCl infusion (0 mL/hr Intravenous Stopped 3/4/22 0518)   iohexoL (OMNIPAQUE 350) injection 100 mL (100 mLs Intravenous Given 3/4/22 0688)     Medical Decision Making:   History:    Old Medical Records: I decided to obtain old medical records.  Old Records Summarized: records from previous admission(s).  Initial Assessment:   Ms. King is a 66 y.o. year old female past medical history of CAD s/p PCI on 02/08/2022 (on DAPT), left breast cancer s/p mastectomy, chronic respiratory failure (on home 2L of O2), COPD, ESRD on HD TTS w/ anemia of chronic disease, Atrial Fib (failed DCCV in Nov 2021); on eliquis and amiodarone, CVA, PVD (s/p left foot amputation on Plavix), HTN, T2DM, HLD who presents to the emergency department due to shortness of breath.  Differential Diagnosis:   Pneumonia  Electrolyte abnormality  CKD  Sepsis  Pulmonary embolism       Clinical Tests:   Lab Tests: Ordered and Reviewed  Radiological Study: Ordered and Reviewed  ED Management:  Patient was examined, labs were ordered including a CBC which was non-significant  CMP was significant for potassium of 8.4  Given that patient was fluid overloaded and had a high potassium nephrology was consulted, discussion was had with nephrology and patient was able to receive emergent dialysis while in the emergency department  Her potassium was also shifted using multiple agents.  Her repeat potassium was 4.3  Patient's Troponin and BMP were elevated.  Given patient's prior history of a fall, CT of her head as well as C-spine was obtained which is pending.   Patient also had right upper quadrant abdominal tenderness and so CT of her abdomen and pelvis was obtained which is also pending.   She was admitted to medicine for further workup.               Attending Attestation:   Physician Attestation Statement for Resident:  As the supervising MD   Physician Attestation Statement: I have personally seen and examined this patient.   I agree with the above history. -:   As the supervising MD I agree with the above PE.    As the supervising MD I agree with the above treatment, course, plan, and disposition.  I have reviewed and agree with  the residents interpretation of the following: lab data, x-rays and EKG.  I have reviewed the following: old records at this facility.                         Clinical Impression:   Final diagnoses:  [R06.02] Shortness of breath  [E87.5] Hyperkalemia (Primary)          ED Disposition Condition    Observation               Lakisha Lewis MD  Resident  03/04/22 0614       Tawnya Reynoso MD  03/04/22 8999

## 2022-03-04 NOTE — ED NOTES
I-STAT Chem-8+ Results:   Value Reference Range   Sodium 138 136-145 mmol/L   Potassium  4.3 3.5-5.1 mmol/L   Chloride 96  mmol/L   Ionized Calcium 1.00 1.06-1.42 mmol/L   CO2 (measured) 27 23-29 mmol/L   Glucose 103  mg/dL   BUN 37 6-30 mg/dL   Creatinine 5.9 0.5-1.4 mg/dL   Hematocrit 34 36-54%

## 2022-03-04 NOTE — ASSESSMENT & PLAN NOTE
-HD 3/4/22  -avoid nephrotoxins  -nephrology consulted for HD needs  -3L UF  -EKG, Calcium gluconate, and shifting if K>6.0  -continue to monitor

## 2022-03-04 NOTE — CARE UPDATE
"Consult acknowledged.     66 y.o. female ESRD-HD TTS, requiring emergent HD for severe hyperkalemia.  Pt at high risk for arrhythmias.    Plan/Recs    -ICU admission  -Cardiac monitor  -Give 2 grams of calcium gluconate  -K levels q 1 hrs  -HD ordered: 3 hrs with 2k bath and F200 filter.  -iSTAT q 30 minutes during HD  -Target K levels after HD: 5-6   -BMP 1 hr after HD.  -EKG, Calcium gluconate, and shifting if K>6.0  -Pt most likely will require additional HD session for metabolic clearance.  -Low K diet.  -Strict I/O and chart   -Pre- and post-HD weights  Full consult to follow.         BP (!) 125/90   Pulse 63   Temp 97.9 °F (36.6 °C) (Oral)   Resp (!) 25   Ht 5' 4" (1.626 m)   Wt 107 kg (236 lb)   SpO2 100%   BMI 40.51 kg/m²     No intake/output data recorded.   -----------------------------  No intake/output data recorded.     Recent Labs   Lab 03/04/22  0009 03/04/22  0033   WBC 6.67  --    HGB 12.0  --    HCT 39.3 40     --    MONO 4.5  0.3  --       Recent Labs   Lab 03/04/22  0009   *   K 8.4*   CL 95   CO2 19*   BUN 94*   CREATININE 11.9*   CALCIUM 8.7   PROT 8.1   BILITOT 1.0   ALKPHOS 120   ALT 21   AST 33      X-Ray Chest 1 View    Result Date: 3/4/2022  Cardiomegaly and coarsened interstitial lung markings which could be related to interstitial edema or chronic change.  No detrimental change in lung aeration when compared with 02/17/2022. Electronically signed by: Dirk Anderson MD Date:    03/04/2022 Time:    00:31       Jossue Oquendo M.D.  Nephrology Fellow  [facility]   "

## 2022-03-04 NOTE — ASSESSMENT & PLAN NOTE
Has been having some low blood pressure readings this morning/pt is asymptomatic   She is on metoprolol 25mg BID, would decrease dose if persistently with SBPs in the 100's  -Plan per primary team

## 2022-03-04 NOTE — CONSULTS
"[facility] - Emergency Dept  Nephrology  Consult Note    Patient Name: Kylie King  MRN: [id number]  Admission Date: 3/3/2022  Hospital Length of Stay: 0 days  Attending Provider: Ousmane Vera MD   Primary Care Physician: Virginia Foote MD  Principal Problem:End-stage renal disease on hemodialysis    Inpatient consult to Nephrology  Consult performed by: Jeri Vasquez MD  Consult ordered by: Tawnya Reynoso MD  Reason for consult: "Emergent dialysis"  Assessment/Recommendations: Please see consult note and staff attestation below for full recommendations. Thank you!         Subjective:     HPI: Kylie King is a 66 y.o. female w/ PMHx ESKD 2/2 DM II, HTN, and L hydronephrosis on HD TTS, Diabetic neuropathy s/p L foot amputation, COPD on 2L O2 at home, colon CA, Afib, Hyperlipidemia, ?CVA, anemia, hyperthyroidism, COVID (2021), venous insufficiency, PAD, MDD, vitamin D deficiency, and obesity admitted on 3/3/2022 for shortness of breath and s/p fall at home    Pt states that she was feeling confused about her dialysis treatment schedule d/t [place], she thought the day of admission was Wednesday (when it was really Thursday) and so she was planning on going to dialysis the next day. Pt's caregiver went to check on her yesterday (3/3) evening and asked the pt about how the dialysis treatment went and it was then when pt realized she was confused and had in fact missed her dialysis treatment. Pt has been short of breath for the past couple of days despite using her O2 (2L NC)and does think that her confusion has been going on for the past couple of days as well. Her last HD treatment was 2/26 (per pt)    Pt states that she has been having loose stools x2 days, she has not taken any medication for this, pt denies any fever, chills, chest pain, n/v/abd pain, and/or headaches.    Of note, pt was recently hospitalized (2/17-2/20/202) for generalized weakness and falls, had also missed dialysis) and prior to " "that on 2/5 /2022 for hyperkalemia d/t missed dialysis.   In the ED she presented with the following vitals 97.9, P 78, 150/68 and 100% on 2L . As for her labs, her K was 8, bicarb 19, and BUN 94, sCr 11.9, troponin 0.529, BNP 4691, hgb 12, WBC 6.67, plts 178, and her CXR showed findings of pulmonary edema along with known cardiomegaly      Nephrology was consulted for: "Emergent dialysis"    Outpatient HD Information:  -Dialysis modality: Hemodialysis  -Outpatient HD unit: [facility]  -Nephrologist: Dr. Kong   -HD TX days: Tuesday/Thursday/Saturday, duration of treatment: 4  -Last HD TX prior to hospital admission: 02/26/2022 (per pt)   -Dialysis access: RUE AV fistula  -Residual urine: Anuric   -EDW: 100kg             Past Medical History:   Diagnosis Date    Anxiety     Breast cancer     left    Carotid artery disease     Cataract     Choledocholithiasis     s/p ERCP and stent placement    Chronic diastolic CHF (congestive heart failure)     Chronic obstructive pulmonary disease     Chronic venous insufficiency     Depression     End-stage renal disease on hemodialysis     M/W/F    GERD (gastroesophageal reflux disease)     History of breast cancer     History of colon cancer 2001    s/p sigmoid colectomy    History of kidney stones     History of osteomyelitis of left foot     History of pancreatitis     History of stroke     Hyperlipidemia     Hypertension     Intracerebral hemorrhage     Lumbar degenerative disc disease     Lumbar spinal stenosis     Morbid obesity     Paroxysmal atrial fibrillation     Peripheral artery disease     Peripheral neuropathy     Tobacco dependence     Type II diabetes mellitus     Urinary incontinence        Past Surgical History:   Procedure Laterality Date    ANGIOGRAM, CORONARY, WITH LEFT HEART CATHETERIZATION N/A 2/7/2022    Procedure: Angiogram, Coronary, with Left Heart Cath;  Surgeon: Adam Rutherford MD;  Location: [facility] CATH LAB;  " Service: Cardiology;  Laterality: N/A;    ANGIOGRAPHY OF LOWER EXTREMITY Right 9/17/2020    Procedure: Angiogram Extremity Unilateral;  Surgeon: RICK Pollard III, MD;  Location: [address];  Service: Peripheral Vascular;  Laterality: Right;  6.2 minutes of fluro  690.88 mGy  112.64 Gycm2  55 ml    ANGIOGRAPHY OF LOWER EXTREMITY Left 5/13/2021    Procedure: Angiogram Extremity Unilateral;  Surgeon: HOMERO Hayden II, MD;  Location: [address];  Service: Vascular;  Laterality: Left;  35.1 min  971.41 mGy  190.27 Gy.cm  101ml Dye    ANGIOGRAPHY OF LOWER EXTREMITY Right 7/19/2021    Procedure: Angiogram Extremity Unilateral;  Surgeon: HOMERO Hayden II, MD;  Location: [address];  Service: Vascular;  Laterality: Right;  3.0 min  121.00 mGy  26.8713 Gy.cm  13ml Dye    AORTOGRAPHY N/A 5/13/2021    Procedure: AORTOGRAM;  Surgeon: HOMERO Hayden II, MD;  Location: [address];  Service: Vascular;  Laterality: N/A;    AV FISTULA PLACEMENT Right 5/28/2018    Procedure: CREATION -FISTULA-AV;  Surgeon: RICK Pollard III, MD;  Location: [address];  Service: Peripheral Vascular;  Laterality: Right;    BREAST BIOPSY  1/2012    left breast invasive mammary carcinoma (lateral bx) and intraductal papilloma (medial bx)    CARDIOVERSION  11/22/2021    CARDIOVERSION  11/22/2021    Procedure: Cardioversion;  Surgeon: Ad Garcia MD;  Location: [facility] CATH LAB;  Service: Cardiology;;    CATARACT EXTRACTION W/  INTRAOCULAR LENS IMPLANT Right 1/24/2019        CATARACT EXTRACTION W/  INTRAOCULAR LENS IMPLANT Left 1/31/2019    Procedure: EXTRACTION, CATARACT, WITH IOL INSERTION;  Surgeon: Immanuel Moncada MD;  Location: [facility];  Service: Ophthalmology;  Laterality: Left;    CHOLECYSTECTOMY  2001    CORONARY ANGIOGRAPHY N/A 2/8/2022    Procedure: ANGIOGRAM, CORONARY ARTERY;  Surgeon: Abelardo Betancur MD;  Location: [facility] CATH LAB;  Service: Cardiology;  Laterality: N/A;    DEBRIDEMENT  OF FOOT Right 2/17/2021    Procedure: DEBRIDEMENT, FOOT right plantar ulcer;  Surgeon: Sonja Corral DPM;  Location: [facility] OR;  Service: Podiatry;  Laterality: Right;    ERCP W/ SPHICTEROTOMY      FINE NEEDLE ASPIRATION  1999    Right breast    FOOT AMPUTATION Left 6/1/2021    Procedure: Transmetatarsal amputation;  Surgeon: Billy Robles DPM;  Location: [facility] OR 2ND FLR;  Service: Podiatry;  Laterality: Left;    FOOT AMPUTATION Left 7/23/2021    Procedure: AMPUTATION, FOOT;  Surgeon: Billy Robles DPM;  [facility];  Service: Podiatry;  Laterality: Left;    FOOT AMPUTATION THROUGH METATARSAL Right 10/15/2020    Procedure: PARTIAL RAY AMPUTATION GREAT TOE;  Surgeon: Billy Robles DPM;  [facility];  Service: Podiatry;  Laterality: Right;  Stretcher OK    HERNIA REPAIR      LAPAROSCOPIC NEPHRECTOMY Left 2011    MASTECTOMY  2013    left    OOPHORECTOMY Left 2001    REVISION OF ARTERIOVENOUS FISTULA Right 8/15/2018    Procedure: REVISION, AV FISTULA;  Surgeon: RICK Pollard III, MD;  [facility];  Service: Peripheral Vascular;  Laterality: Right;    SIGMOIDECTOMY  2001    SURGICAL REMOVAL OF METATARSAL HEAD Right 2/17/2021    Procedure: OSTECTOMY, METATARSAL BONE, diatal 1st;  Surgeon: Sonja Corral DPM;  Location: [facility] OR;  Service: Podiatry;  Laterality: Right;    TOE AMPUTATION Left 5/13/2021    Procedure: AMPUTATION, TOE;  Surgeon: HOMERO Hayden II, MD;  [facility];  Service: Vascular;  Laterality: Left;    TOTAL REDUCTION MAMMOPLASTY Right 2013       Review of patient's allergies indicates:   Allergen Reactions    Cyclobenzaprine Hallucinations    Erythromycin      Stomach upset    Keflex [cephalexin]      Yeast infection    Erythromycin base      Other reaction(s): upset stomach     Current Facility-Administered Medications   Medication Frequency    acetaminophen tablet 650 mg Q4H PRN    albuterol-ipratropium 2.5 mg-0.5 mg/3  mL nebulizer solution 3 mL Q4H PRN    amiodarone tablet 200 mg Daily    atorvastatin tablet 40 mg Daily    bisacodyL suppository 10 mg Daily PRN    dextrose 10% bolus 125 mL PRN    dextrose 10% bolus 250 mL PRN    glucagon (human recombinant) injection 1 mg PRN    glucose chewable tablet 16 g PRN    glucose chewable tablet 24 g PRN    insulin aspart U-100 pen 0-5 Units QID (AC + HS) PRN    insulin aspart U-100 pen 3 Units TIDWM    insulin detemir U-100 pen 4 Units BID    melatonin tablet 6 mg Nightly PRN    methocarbamoL tablet 500 mg TID    metoprolol tartrate (LOPRESSOR) tablet 25 mg BID    naloxone 0.4 mg/mL injection 0.02 mg PRN    omega 3-dha-epa-fish oil capsule 1 capsule QAM    ondansetron disintegrating tablet 8 mg Q8H PRN    polyethylene glycol packet 17 g Daily PRN    promethazine tablet 25 mg Q6H PRN    sevelamer carbonate tablet 1,600 mg TID WM    sodium chloride 0.9% bolus 250 mL PRN    sodium chloride 0.9% flush 10 mL Q8H PRN     Current Outpatient Medications   Medication    acetaminophen-codeine 300-30mg (TYLENOL #3) 300-30 mg Tab    albuterol (PROVENTIL/VENTOLIN HFA) 90 mcg/actuation inhaler    amiodarone (PACERONE) 200 MG Tab    apixaban (ELIQUIS) 2.5 mg Tab    aspirin (ECOTRIN) 81 MG EC tablet    atorvastatin (LIPITOR) 40 MG tablet    blood sugar diagnostic Strp    blood-glucose sensor (DEXCOM G6 SENSOR) Umm    blood-glucose transmitter (DEXCOM G6 TRANSMITTER) Umm    clopidogreL (PLAVIX) 75 mg tablet    fish oil-omega-3 fatty acids 300-1,000 mg capsule    flash glucose scanning reader (FREESTYLE LULU 14 DAY READER) Misc    flash glucose scanning reader (FREESTYLE LULU 2 READER) Misc    flash glucose sensor (FREESTYLE LULU 14 DAY SENSOR) Kit    flash glucose sensor (FREESTYLE LULU 2 SENSOR) Kit    insulin aspart U-100 (NOVOLOG) 100 unit/mL (3 mL) InPn pen    insulin detemir U-100 (LEVEMIR FLEXTOUCH) 100 unit/mL (3 mL) SubQ InPn pen    lancets (ONETOUCH  DELICA LANCETS) 33 gauge Misc    methocarbamoL (ROBAXIN) 500 MG Tab    metoprolol tartrate (LOPRESSOR) 25 MG tablet    nitroGLYCERIN (NITROSTAT) 0.3 MG SL tablet    senna-docusate 8.6-50 mg (PERICOLACE) 8.6-50 mg per tablet    sevelamer carbonate (RENVELA) 800 mg Tab     Family History       Problem Relation (Age of Onset)    Arthritis Mother    Breast cancer Maternal Grandmother    Cancer Maternal Grandmother, Maternal Aunt    Celiac disease Sister    Diabetes Father    Heart disease Mother, Father, Maternal Grandmother          Tobacco Use    Smoking status: Current Some Day Smoker     Packs/day: 0.50     Years: 28.00     Pack years: 14.00     Types: Cigarettes     Start date: 1990     Last attempt to quit: 2018     Years since quittin.1    Smokeless tobacco: Never Used    Tobacco comment: anxious   Substance and Sexual Activity    Alcohol use: No    Drug use: No    Sexual activity: Not Currently     Partners: Male     Review of Systems  Objective:     Vital Signs (Most Recent):  Temp: 98 °F (36.7 °C) (22 0712)  Pulse: 70 (22 1301)  Resp: 18 (22 1301)  BP: 91/64 (22 1101)  SpO2: 96 % (22 1301)  O2 Device (Oxygen Therapy): nasal cannula (22 0645) Vital Signs (24h Range):  Temp:  [97.7 °F (36.5 °C)-98 °F (36.7 °C)] 98 °F (36.7 °C)  Pulse:  [] 70  Resp:  [17-26] 18  SpO2:  [84 %-100 %] 96 %  BP: ()/(51-90) 91/64     Weight: 107 kg (236 lb) (22 2301)  Body mass index is 40.51 kg/m².  Body surface area is 2.2 meters squared.    I/O last 3 completed shifts:  In: 800 [Other:800]  Out: 3800 [Other:3800]    Physical Exam  Vitals and nursing note reviewed.   Constitutional:       General: She is not in acute distress.     Appearance: Normal appearance. She is ill-appearing. She is not toxic-appearing or diaphoretic.   Cardiovascular:      Rate and Rhythm: Normal rate and regular rhythm.      Pulses: Normal pulses.      Heart sounds: Murmur heard.    Pulmonary:      Effort: Pulmonary effort is normal. No respiratory distress.      Breath sounds: Rales present. No wheezing or rhonchi.      Comments: Decreased breath sounds and + crackles  Abdominal:      General: Abdomen is flat. Bowel sounds are normal. There is no distension.      Palpations: Abdomen is soft. There is no mass.      Tenderness: There is no abdominal tenderness. There is no guarding or rebound.   Genitourinary:     Comments: Brown loose stools noted on pad   Musculoskeletal:         General: Swelling and deformity present. No tenderness.      Right lower leg: No edema.      Left lower leg: No edema.      Comments: L foot amputation   Skin:     General: Skin is warm.      Capillary Refill: Capillary refill takes 2 to 3 seconds.      Coloration: Skin is not jaundiced or pale.      Findings: Lesion and rash present. No bruising or erythema.      Comments: Venous congestion maximilian LEs, no open wounds noted   Neurological:      Mental Status: She is alert.      Comments: Oriented x2 self and place    Psychiatric:      Comments: Sleepy, anxious at times        Significant Labs:  ABGs: No results for input(s): PH, PCO2, HCO3, POCSATURATED, BE in the last 168 hours.  Cardiac Markers: No results for input(s): CKMB, TROPONINT, MYOGLOBIN in the last 168 hours.  CBC:   Recent Labs   Lab 03/04/22  0009 03/04/22  0033 03/04/22  0521   WBC 6.67  --   --    RBC 4.24  --   --    HGB 12.0  --   --    HCT 39.3   < > 34*     --   --    MCV 93  --   --    MCH 28.3  --   --    MCHC 30.5*  --   --     < > = values in this interval not displayed.     CMP:   Recent Labs   Lab 03/04/22  0009 03/04/22  1042 03/04/22  1445   * 84 134*   CALCIUM 8.7 8.2* 8.2*   ALBUMIN 3.9 3.0*  --    PROT 8.1  --   --    * 135* 138   K 8.4* 5.0 5.6*   CO2 19* 27 25   CL 95 95 95   BUN 94* 44* 44*   CREATININE 11.9* 7.4* 7.2*   ALKPHOS 120  --   --    ALT 21  --   --    AST 33  --   --    BILITOT 1.0  --   --       Coagulation: No results for input(s): PT, INR, APTT in the last 168 hours.  Microbiology Results (last 7 days)       ** No results found for the last 168 hours. **          PTH: No results for input(s): PTH in the last 168 hours.  All labs within the past 24 hours have been reviewed.    Significant Imaging:  Labs: Reviewed  X-Ray: Reviewed    Assessment/Plan:     * End-stage renal disease on hemodialysis  ESKD 2/2 DM, HT, and L hydronephrosis     Outpatient HD Information:  -Dialysis modality: Hemodialysis  -Outpatient HD unit: [facility]  -Nephrologist: Dr. Kong   -HD TX days: Tuesday/Thursday/Saturday, duration of treatment: 4  -Last HD TX prior to hospital admission: 02/26/2022 (per pt)   -Dialysis access: RUE AV fistula  -Residual urine: Anuric   -EDW: 100kg         Plan/Recommendations:   -iHD received emergent HD today (03/04/2022) with UF -3L , she will need another HD treatment tomorrow 3/5 (her usual Saturday HD treatment)   -I strongly recommend a social work//psych consult to assist pt in making decision about her plan for discharge home vs SNF since pt appears not to be able to manage her own care, has missed many dialysis sessions in the last 3 months. Pt has help once a day/not routinely and those who help her come by at night time.   -Electrolytes: hyperkalemia 8-->5.6, repeat labs in AM   -Mineral & Bone Disorder:   Phosphorus 6.9, PTH level ordered cont sevelamer 1600mg tid w/ meals  -ESRD anemia: hgb 12, goal hgb 10-11g/dL,ESAs not indicated, transfuse if hgb <7  -renal function panel in AM  -Renal diet and protein supplement, if not NPO   -Strict I/O's and daily weights    Atrial fibrillation  On amiodarone  -Plan per primary team       Secondary hyperparathyroidism  See ESRD    Hyperkalemia  2/2 missed HD treatment  See ESRD    COPD (chronic obstructive pulmonary disease)  On home O2  -Plan per primary team       Type II diabetes mellitus  Lab Results   Component  Value Date    HGBA1C 5.9 (H) 11/03/2021     -Plan per primary team       Hypertension  Has been having some low blood pressure readings this morning/pt is asymptomatic   She is on metoprolol 25mg BID, would decrease dose if persistently with SBPs in the 100's  -Plan per primary team       *Pt was consented for HD, pt signed consent     Thank you for your consult. I will follow-up with patient. Please contact us if you have any additional questions.    Jeri Vasquez MD  Nephrology  [facility] - Emergency Dept

## 2022-03-04 NOTE — ASSESSMENT & PLAN NOTE
Body mass index is 40.51 kg/m². Morbid obesity complicates all aspects of disease management from diagnostic modalities to treatment. Weight loss encouraged and health benefits explained to patient.

## 2022-03-04 NOTE — SUBJECTIVE & OBJECTIVE
Past Medical History:   Diagnosis Date    Anxiety     Breast cancer     left    Carotid artery disease     Cataract     Choledocholithiasis     s/p ERCP and stent placement    Chronic diastolic CHF (congestive heart failure)     Chronic obstructive pulmonary disease     Chronic venous insufficiency     Depression     End-stage renal disease on hemodialysis     M/W/F    GERD (gastroesophageal reflux disease)     History of breast cancer     History of colon cancer 2001    s/p sigmoid colectomy    History of kidney stones     History of osteomyelitis of left foot     History of pancreatitis     History of stroke     Hyperlipidemia     Hypertension     Intracerebral hemorrhage     Lumbar degenerative disc disease     Lumbar spinal stenosis     Morbid obesity     Paroxysmal atrial fibrillation     Peripheral artery disease     Peripheral neuropathy     Tobacco dependence     Type II diabetes mellitus     Urinary incontinence        Past Surgical History:   Procedure Laterality Date    ANGIOGRAM, CORONARY, WITH LEFT HEART CATHETERIZATION N/A 2/7/2022    Procedure: Angiogram, Coronary, with Left Heart Cath;  Surgeon: Adam Rutherford MD;  Location: [facility] CATH LAB;  Service: Cardiology;  Laterality: N/A;    ANGIOGRAPHY OF LOWER EXTREMITY Right 9/17/2020    Procedure: Angiogram Extremity Unilateral;  Surgeon: RICK Pollard III, MD;  Location: [facility] OR [address] [place] [address];  Service: Peripheral Vascular;  Laterality: Right;  6.2 minutes of fluro  690.88 mGy  112.64 Gycm2  55 ml    ANGIOGRAPHY OF LOWER EXTREMITY Left 5/13/2021    Procedure: Angiogram Extremity Unilateral;  Surgeon: HOMERO Hayden II, MD;  Location: [facility] OR [address] [place] [address];  Service: Vascular;  Laterality: Left;  35.1 min  971.41 mGy  190.27 Gy.cm  101ml Dye    ANGIOGRAPHY OF LOWER EXTREMITY Right 7/19/2021    Procedure: Angiogram Extremity Unilateral;  Surgeon: HOMERO Hayden II, MD;  Location: [address];  Service: Vascular;  Laterality: Right;  3.0 min  121.00  mGy  26.8713 Gy.cm  13ml Dye    AORTOGRAPHY N/A 5/13/2021    Procedure: AORTOGRAM;  Surgeon: HOMERO Hayden II, MD;  [facility];  Service: Vascular;  Laterality: N/A;    AV FISTULA PLACEMENT Right 5/28/2018    Procedure: CREATION -FISTULA-AV;  Surgeon: RICK Pollard III, MD;  [facility];  Service: Peripheral Vascular;  Laterality: Right;    BREAST BIOPSY  1/2012    left breast invasive mammary carcinoma (lateral bx) and intraductal papilloma (medial bx)    CARDIOVERSION  11/22/2021    CARDIOVERSION  11/22/2021    Procedure: Cardioversion;  Surgeon: Ad Garcia MD;  Location: [facility] CATH LAB;  Service: Cardiology;;    CATARACT EXTRACTION W/  INTRAOCULAR LENS IMPLANT Right 1/24/2019        CATARACT EXTRACTION W/  INTRAOCULAR LENS IMPLANT Left 1/31/2019    Procedure: EXTRACTION, CATARACT, WITH IOL INSERTION;  Surgeon: Immanuel Moncada MD;  Location: [facility];  Service: Ophthalmology;  Laterality: Left;    CHOLECYSTECTOMY  2001    CORONARY ANGIOGRAPHY N/A 2/8/2022    Procedure: ANGIOGRAM, CORONARY ARTERY;  Surgeon: Abelardo Betancur MD;  Location: [facility] CATH LAB;  Service: Cardiology;  Laterality: N/A;    DEBRIDEMENT OF FOOT Right 2/17/2021    Procedure: DEBRIDEMENT, FOOT right plantar ulcer;  Surgeon: Sonja Corral DPM;  Location: [facility];  Service: Podiatry;  Laterality: Right;    ERCP W/ SPHICTEROTOMY      FINE NEEDLE ASPIRATION  1999    Right breast    FOOT AMPUTATION Left 6/1/2021    Procedure: Transmetatarsal amputation;  Surgeon: Billy Robles DPM;  [facility];  Service: Podiatry;  Laterality: Left;    FOOT AMPUTATION Left 7/23/2021    Procedure: AMPUTATION, FOOT;  Surgeon: Billy Robles DPM;  [facility];  Service: Podiatry;  Laterality: Left;    FOOT AMPUTATION THROUGH METATARSAL Right 10/15/2020    Procedure: PARTIAL RAY AMPUTATION GREAT TOE;  Surgeon: Billy Robles DPM;  [facility];  Service: Podiatry;  Laterality:  Right;  Stretcher OK    HERNIA REPAIR      LAPAROSCOPIC NEPHRECTOMY Left 2011    MASTECTOMY  2013    left    OOPHORECTOMY Left 2001    REVISION OF ARTERIOVENOUS FISTULA Right 8/15/2018    Procedure: REVISION, AV FISTULA;  Surgeon: RICK Pollard III, MD;  Location: [facility] OR [address] [place] [address];  Service: Peripheral Vascular;  Laterality: Right;    SIGMOIDECTOMY  2001    SURGICAL REMOVAL OF METATARSAL HEAD Right 2/17/2021    Procedure: OSTECTOMY, METATARSAL BONE, diatal 1st;  Surgeon: Sonja Corral DPM;  Location: [facility] OR;  Service: Podiatry;  Laterality: Right;    TOE AMPUTATION Left 5/13/2021    Procedure: AMPUTATION, TOE;  Surgeon: HOMERO Hayden II, MD;  [facility];  Service: Vascular;  Laterality: Left;    TOTAL REDUCTION MAMMOPLASTY Right 2013       Review of patient's allergies indicates:   Allergen Reactions    Cyclobenzaprine Hallucinations    Erythromycin      Stomach upset    Keflex [cephalexin]      Yeast infection    Erythromycin base      Other reaction(s): upset stomach       No current facility-administered medications on file prior to encounter.     Current Outpatient Medications on File Prior to Encounter   Medication Sig    acetaminophen-codeine 300-30mg (TYLENOL #3) 300-30 mg Tab Take 1 tablet by mouth every 8 (eight) hours as needed.    albuterol (PROVENTIL/VENTOLIN HFA) 90 mcg/actuation inhaler Inhale 2 puffs into the lungs every 6 (six) hours as needed for Wheezing. Rescue    amiodarone (PACERONE) 200 MG Tab Take 1 tablet (200 mg total) by mouth once daily.    apixaban (ELIQUIS) 2.5 mg Tab Take 1 tablet (2.5 mg total) by mouth 2 (two) times daily.    aspirin (ECOTRIN) 81 MG EC tablet Take 1 tablet (81 mg total) by mouth once daily. Take until 3/8/22, then stop unless further instructed by provider.    atorvastatin (LIPITOR) 40 MG tablet Take 1 tablet (40 mg total) by mouth once daily.    blood sugar diagnostic Strp 1 strip by Misc.(Non-Drug; Combo Route) route 4 (four) times daily.     blood-glucose sensor (DEXCOM G6 SENSOR) Umm 1 each by Misc.(Non-Drug; Combo Route) route every 10 days. (Patient not taking: Reported on 2/21/2022)    blood-glucose transmitter (DEXCOM G6 TRANSMITTER) Umm 1 Device by Misc.(Non-Drug; Combo Route) route continuous. (Patient not taking: Reported on 2/21/2022)    clopidogreL (PLAVIX) 75 mg tablet Take 1 tablet (75 mg total) by mouth once daily.    fish oil-omega-3 fatty acids 300-1,000 mg capsule Take 1 capsule by mouth every morning.    flash glucose scanning reader (FREESTYLE LULU 14 DAY READER) Misc Use as directed to check blood sugars (Patient not taking: Reported on 2/21/2022)    flash glucose scanning reader (FREESTYLE LULU 2 READER) Misc 1 Units by Misc.(Non-Drug; Combo Route) route continuous prn. (Patient not taking: Reported on 2/21/2022)    flash glucose sensor (FREESTYLE LULU 14 DAY SENSOR) Kit Use as directed to check blood sugars (Patient not taking: Reported on 2/21/2022)    flash glucose sensor (FREESTYLE LULU 2 SENSOR) Kit 1 Units by Misc.(Non-Drug; Combo Route) route every 14 (fourteen) days. (Patient not taking: Reported on 2/21/2022)    insulin aspart U-100 (NOVOLOG) 100 unit/mL (3 mL) InPn pen Inject 3 Units into the skin 3 (three) times daily with meals.    insulin detemir U-100 (LEVEMIR FLEXTOUCH) 100 unit/mL (3 mL) SubQ InPn pen Inject 4 Units into the skin 2 (two) times daily.    lancets (ONETOUCH DELICA LANCETS) 33 gauge Misc 1 lancet by Misc.(Non-Drug; Combo Route) route 4 (four) times daily before meals and nightly.    methocarbamoL (ROBAXIN) 500 MG Tab Take 1 tablet (500 mg total) by mouth 3 (three) times daily.    metoprolol tartrate (LOPRESSOR) 25 MG tablet Take 1 tablet (25 mg total) by mouth 2 (two) times daily.    nitroGLYCERIN (NITROSTAT) 0.3 MG SL tablet Place 1 tablet (0.3 mg total) under the tongue every 5 (five) minutes as needed for Chest pain.    senna-docusate 8.6-50 mg (PERICOLACE) 8.6-50 mg per tablet Take 1 tablet by  mouth 2 (two) times daily. (Patient not taking: No sig reported)    sevelamer carbonate (RENVELA) 800 mg Tab Take 2 tablets (1,600 mg total) by mouth 3 (three) times daily with meals. (Patient not taking: No sig reported)     Family History       Problem Relation (Age of Onset)    Arthritis Mother    Breast cancer Maternal Grandmother    Cancer Maternal Grandmother, Maternal Aunt    Celiac disease Sister    Diabetes Father    Heart disease Mother, Father, Maternal Grandmother          Tobacco Use    Smoking status: Current Some Day Smoker     Packs/day: 0.50     Years: 28.00     Pack years: 14.00     Types: Cigarettes     Start date: 1990     Last attempt to quit: 2018     Years since quittin.1    Smokeless tobacco: Never Used    Tobacco comment: anxious   Substance and Sexual Activity    Alcohol use: No    Drug use: No    Sexual activity: Not Currently     Partners: Male     Review of Systems   Constitutional:  Positive for activity change and fatigue. Negative for chills, diaphoresis and fever.   HENT:  Negative for congestion, facial swelling, rhinorrhea and sore throat.    Eyes:  Negative for photophobia, itching and visual disturbance.   Respiratory:  Positive for chest tightness. Negative for cough, shortness of breath and wheezing.    Cardiovascular:  Negative for chest pain, palpitations and leg swelling.   Gastrointestinal:  Negative for abdominal distention, abdominal pain, blood in stool, constipation, diarrhea, nausea and vomiting.   Genitourinary:  Negative for difficulty urinating, dysuria, frequency, hematuria and urgency.   Musculoskeletal:  Negative for arthralgias, back pain and neck stiffness.   Neurological:  Negative for dizziness, tremors, seizures, syncope, weakness, light-headedness, numbness and headaches.   Psychiatric/Behavioral:  Negative for agitation, confusion and hallucinations.    Objective:     Vital Signs (Most Recent):  Temp: 98 °F (36.7 °C) (22 0712)  Pulse: 70  (03/04/22 1301)  Resp: 18 (03/04/22 1301)  BP: 91/64 (03/04/22 1101)  SpO2: 96 % (03/04/22 1301) Vital Signs (24h Range):  Temp:  [97.7 °F (36.5 °C)-98 °F (36.7 °C)] 98 °F (36.7 °C)  Pulse:  [] 70  Resp:  [17-26] 18  SpO2:  [84 %-100 %] 96 %  BP: ()/(51-90) 91/64     Weight: 107 kg (236 lb)  Body mass index is 40.51 kg/m².    Physical Exam  Vitals and nursing note reviewed.   Constitutional:       General: She is not in acute distress.     Appearance: She is well-developed. She is obese. She is not diaphoretic.   HENT:      Head: Normocephalic and atraumatic.      Right Ear: External ear normal.      Left Ear: External ear normal.      Nose: Nose normal. No congestion.      Mouth/Throat:      Pharynx: Oropharynx is clear.   Eyes:      General: No scleral icterus.     Extraocular Movements: Extraocular movements intact.   Cardiovascular:      Rate and Rhythm: Normal rate and regular rhythm.      Pulses: Normal pulses.      Heart sounds: Normal heart sounds. No murmur heard.  Pulmonary:      Effort: Pulmonary effort is normal. No respiratory distress.      Breath sounds: Normal breath sounds. No wheezing or rales.   Abdominal:      General: Bowel sounds are normal. There is no distension.      Palpations: Abdomen is soft.      Tenderness: There is no abdominal tenderness. There is no guarding or rebound.   Musculoskeletal:      Cervical back: Normal range of motion.      Right lower leg: No edema.      Left lower leg: No edema.   Skin:     General: Skin is warm and dry.      Capillary Refill: Capillary refill takes less than 2 seconds.   Neurological:      General: No focal deficit present.      Mental Status: She is oriented to person, place, and time and easily aroused. Mental status is at baseline. She is lethargic.   Psychiatric:         Mood and Affect: Mood normal.         Behavior: Behavior normal.         Thought Content: Thought content normal.           Significant Labs: All pertinent labs within  the past 24 hours have been reviewed.  CBC:   Recent Labs   Lab 03/04/22  0009 03/04/22  0033 03/04/22  0521   WBC 6.67  --   --    HGB 12.0  --   --    HCT 39.3 40 34*     --   --      CMP:   Recent Labs   Lab 03/04/22  0009 03/04/22  1042   * 135*   K 8.4* 5.0   CL 95 95   CO2 19* 27   * 84   BUN 94* 44*   CREATININE 11.9* 7.4*   CALCIUM 8.7 8.2*   PROT 8.1  --    ALBUMIN 3.9 3.0*   BILITOT 1.0  --    ALKPHOS 120  --    AST 33  --    ALT 21  --    ANIONGAP 21* 13   EGFRNONAA 2.9* 5.2*     Cardiac Markers:   Recent Labs   Lab 03/04/22  0009   BNP 4,691*     Troponin:   Recent Labs   Lab 03/04/22  0009 03/04/22  0652   TROPONINI 0.529* 0.391*       Significant Imaging: I have reviewed all pertinent imaging results/findings within the past 24 hours.  Imaging Results              CT Abdomen Pelvis With Contrast (Final result)  Result time 03/04/22 06:58:39      Final result by Rajiv Velazquez MD (03/04/22 06:58:39)                   Impression:      1. No detrimental change or acute process seen within the abdomen or pelvis.  2. Slightly improved bibasilar mild dependent atelectasis.  3. Hepatosplenomegaly.  4. Status post remote nephrectomy.  Multiple stable prominent to mildly enlarged periaortic and left inguinal lymph nodes.  No mass or fluid collection within the nephrectomy bed.  5. Grossly stable bilateral adrenal nodules.  6. Slight interval progressed healing of subacute right inferior pubic ramus fracture.  7. Grossly stable additional findings as above.      Electronically signed by: Rajiv Velazquez MD  Date:    03/04/2022  Time:    06:58               Narrative:    EXAMINATION:  CT ABDOMEN PELVIS WITH CONTRAST    CLINICAL HISTORY:  Abdominal pain, acute, nonlocalized;    TECHNIQUE:  Low dose axial images, sagittal and coronal reformations were obtained from the lung bases to the pubic symphysis following the IV administration of 100 mL of Omnipaque 350 .  Oral contrast was not  given.    COMPARISON:  CT abdomen and pelvis 02/17/2022 and abdominal radiograph 02/18/2022; MRI pelvis 08/14/2019, renal ultrasound 04/20/2018, abdominal ultrasound 03/20/2018    FINDINGS:  Patient is rotated and tilted within the scanner.  Beam hardening with streak artifact from overlying monitoring leads and adjacent upper extremities somewhat limits evaluation.    Base of the heart appears mildly enlarged with multi-vessel coronary arterial calcifications but no significant pericardial fluid.  Partially imaged left breast implant again noted.    Imaged lung bases show mild dependent atelectasis slightly improved from prior with scattered areas of platelike scarring versus atelectasis.  No new consolidation or pleural effusion seen.    Liver is mildly enlarged without focal parenchymal lesion seen.  Remote cholecystectomy.  Grossly stable dilated common bile duct.  No significant intrahepatic biliary ductal dilatation.  Spleen is mildly enlarged without focal process seen.  Grossly stable bilateral adrenal small nodules.  Pancreas, stomach and duodenum are within normal limits.    Right kidney is atrophic consistent with end-stage renal disease, unchanged.  No hydronephrosis.  Grossly stable nonspecific right perinephric stranding.  Grossly stable subcentimeter hypoattenuating parenchymal foci which are too small to characterize but likely represent cysts.  No large radiodense nephrolith.  Status post left nephrectomy.  No new soft tissue density mass or fluid collection within the nephrectomy bed.    Grossly stable multiple prominent to mildly enlarged periaortic lymph nodes, more so on the left side.  No enlarged mesenteric or right inguinal lymph nodes by CT criteria.  Grossly stable prominent to mildly enlarged left inguinal lymph nodes.    Urinary bladder is suboptimally distended with circumferential wall thickening.  Uterus and bilateral adnexal regions are grossly stable.  Pelvic phleboliths noted.  No  significant amount of free fluid seen in the pelvis.    No evidence of bowel obstruction or inflammation.  No pneumatosis or portal venous gas.    There is trace volume free fluid about the inferior right hepatic lobe, unchanged.  No intraperitoneal free air.    Severe calcific atherosclerosis of the aorta and its branch vessels.  No aortic aneurysm or dissection.    Moderate diffuse body wall edema not significantly changed.    Slight interval healing of known right inferior pubic ramus fracture.  No new acute displaced fracture-dislocation or destructive osseous process seen.  Overall osseous structures appear stable elsewhere.                                       CT Head Without Contrast (Final result)  Result time 03/04/22 07:07:13      Final result by Rajiv Velazquez MD (03/04/22 07:07:13)                   Impression:      No acute intracranial hemorrhage or recent major vascular territory infarct.    No acute fracture or traumatic malalignment of the cervical spine.    Remote lacunar-type infarcts of the right basal ganglia and thalamus.    Cerebral volume loss and findings suggestive of moderate chronic microvascular ischemic change.    Degenerative changes of the cervical spine.  Up to severe neural foraminal narrowing at C3-C4 and C4-C5.  No high-grade spinal canal stenosis.    Electronically signed by resident: Bobby Montanez  Date:    03/04/2022  Time:    06:32    Electronically signed by: Rajiv Velazquez MD  Date:    03/04/2022  Time:    07:07               Narrative:    EXAMINATION:  CT HEAD WITHOUT CONTRAST; CT CERVICAL SPINE WITHOUT CONTRAST    CLINICAL HISTORY:  Head trauma, minor (Age >= 65y);; Neck trauma (Age >= 65y);    TECHNIQUE:  Low dose axial CT images obtained throughout the head and cervical spine without intravenous contrast. Sagittal and coronal reconstructions were performed.    COMPARISON:  CT head 02/17/2022; CT cervical spine 07/13/2021    FINDINGS:  Mild cerebral volume loss.   Ventricles are stable in size and configuration.  No evidence of acute hydrocephalus.  No extra-axial blood or fluid collections.    Unchanged right basal ganglia and thalamus remote lacunar type infarcts.  Periventricular white matter hypoattenuation while nonspecific compatible with moderate chronic microvascular ischemic change.  No parenchymal mass, hemorrhage, edema or major vascular distribution infarct.    No displaced calvarial fracture.  Skull base calcific atherosclerosis.  Mastoid air cells and visualized paranasal sinuses are essentially clear.    Generalized osteopenia.  Craniocervical junction is intact.  Moderate degenerative changes at the atlantodental interval.  Pre dens space is maintained.  Generalized osteopenic changes of the cervical spine.  No acute fracture or dislocation.  There is straightening of the cervical lordosis.  Near or total osseous fusion of the bilateral facets at C3-C4.  Minimal anterolisthesis C4 on C5, C5 on C6, and T1 on T2 approximally 2 mm.  Vertebral body heights are stable.  Mild multilevel intervertebral disc height loss and degenerative changes.    C2-C3: Facet joint arthropathy greater on the left contributing to mild left neural foraminal narrowing..    C3-C4: Facet joint arthropathy contributing to severe left and moderate right neural foraminal narrowing.    C4-C5: Facet joint arthropathy greater on the right and posterior disc osteophyte complex contributing to severe right and mild left neural foraminal narrowing.    C5-C6: No significant neural foraminal narrowing or spinal canal stenosis.    C6-C7: Posterior disc osteophyte complex resulting in effacement the anterior thecal sac.    C7-T1: Facet joint arthropathy greater on the right.  No significant neural foraminal or spinal canal stenosis.    Miscellaneous: Motion artifact limits evaluation of the lung parenchyma however no large focal consolidation.  Moderate calcific atherosclerosis of the aortic arch and  carotid bifurcations.  Thyroid is unremarkable.  No cervical lymphadenopathy.                                       CT Cervical Spine Without Contrast (Final result)  Result time 03/04/22 07:07:13      Final result by Rajiv Velazquez MD (03/04/22 07:07:13)                   Impression:      No acute intracranial hemorrhage or recent major vascular territory infarct.    No acute fracture or traumatic malalignment of the cervical spine.    Remote lacunar-type infarcts of the right basal ganglia and thalamus.    Cerebral volume loss and findings suggestive of moderate chronic microvascular ischemic change.    Degenerative changes of the cervical spine.  Up to severe neural foraminal narrowing at C3-C4 and C4-C5.  No high-grade spinal canal stenosis.    Electronically signed by resident: Bobby Montanez  Date:    03/04/2022  Time:    06:32    Electronically signed by: Rajiv Velazquez MD  Date:    03/04/2022  Time:    07:07               Narrative:    EXAMINATION:  CT HEAD WITHOUT CONTRAST; CT CERVICAL SPINE WITHOUT CONTRAST    CLINICAL HISTORY:  Head trauma, minor (Age >= 65y);; Neck trauma (Age >= 65y);    TECHNIQUE:  Low dose axial CT images obtained throughout the head and cervical spine without intravenous contrast. Sagittal and coronal reconstructions were performed.    COMPARISON:  CT head 02/17/2022; CT cervical spine 07/13/2021    FINDINGS:  Mild cerebral volume loss.  Ventricles are stable in size and configuration.  No evidence of acute hydrocephalus.  No extra-axial blood or fluid collections.    Unchanged right basal ganglia and thalamus remote lacunar type infarcts.  Periventricular white matter hypoattenuation while nonspecific compatible with moderate chronic microvascular ischemic change.  No parenchymal mass, hemorrhage, edema or major vascular distribution infarct.    No displaced calvarial fracture.  Skull base calcific atherosclerosis.  Mastoid air cells and visualized paranasal sinuses are essentially  clear.    Generalized osteopenia.  Craniocervical junction is intact.  Moderate degenerative changes at the atlantodental interval.  Pre dens space is maintained.  Generalized osteopenic changes of the cervical spine.  No acute fracture or dislocation.  There is straightening of the cervical lordosis.  Near or total osseous fusion of the bilateral facets at C3-C4.  Minimal anterolisthesis C4 on C5, C5 on C6, and T1 on T2 approximally 2 mm.  Vertebral body heights are stable.  Mild multilevel intervertebral disc height loss and degenerative changes.    C2-C3: Facet joint arthropathy greater on the left contributing to mild left neural foraminal narrowing..    C3-C4: Facet joint arthropathy contributing to severe left and moderate right neural foraminal narrowing.    C4-C5: Facet joint arthropathy greater on the right and posterior disc osteophyte complex contributing to severe right and mild left neural foraminal narrowing.    C5-C6: No significant neural foraminal narrowing or spinal canal stenosis.    C6-C7: Posterior disc osteophyte complex resulting in effacement the anterior thecal sac.    C7-T1: Facet joint arthropathy greater on the right.  No significant neural foraminal or spinal canal stenosis.    Miscellaneous: Motion artifact limits evaluation of the lung parenchyma however no large focal consolidation.  Moderate calcific atherosclerosis of the aortic arch and carotid bifurcations.  Thyroid is unremarkable.  No cervical lymphadenopathy.                                       X-Ray Chest 1 View (Final result)  Result time 03/04/22 00:31:19      Final result by Dirk Anderson MD (03/04/22 00:31:19)                   Impression:      Cardiomegaly and coarsened interstitial lung markings which could be related to interstitial edema or chronic change.  No detrimental change in lung aeration when compared with 02/17/2022.      Electronically signed by: Dirk Anderson MD  Date:    03/04/2022  Time:    00:31  "              Narrative:    EXAMINATION:  XR CHEST 1 VIEW    CLINICAL HISTORY:  Provided history is "shortness of breath;  ".    TECHNIQUE:  One view of the chest.    COMPARISON:  02/17/2022.    FINDINGS:  Cardiac wires overlie the chest.  Patient is rotated.  Cardiomediastinal silhouette is enlarged and similar to the prior study.  Atherosclerotic calcifications overlie the aortic arch.  There is increased attenuation overlying the left mid and lower lung zone, similar to the prior study, potentially exaggerated by unilateral left-sided breast prosthesis.  Underlying infectious/inflammatory process or interstitial edema not excluded.  There are persistent coarsened bilateral interstitial lung markings, not significantly changed when compared with the prior study.  No large pleural effusion.  No pneumothorax.                                      "

## 2022-03-04 NOTE — H&P
[facility] - Emergency Dept  [facility]  History & Physical    Patient Name: Kylie King  MRN: [id number]  Patient Class: OP- Observation  Admission Date: 3/3/2022  Attending Physician: Ousmane Vera MD   Primary Care Provider: Virginia Foote MD         Patient information was obtained from patient, past medical records, ER records and primary team.     Subjective:     Principal Problem:End-stage renal disease on hemodialysis    Chief Complaint:   Chief Complaint   Patient presents with    Shortness of Breath     Was not wearing home O2. Last dialysis on Saturday. Also fall at home yesterday        HPI: Kylie King is a 66 y.o. year old female with PMHx significant for CAD s/p PCI on 02/08/2022 (on DAPT), left breast cancer s/p mastectomy, chronic respiratory failure (on home 2L of O2), COPD, ESRD on HD TTS w/ anemia of chronic disease, Atrial Fib (failed DCCV in Nov 2021); on eliquis and amiodarone, CVA, PVD (s/p left foot amputation on Plavix), HTN, T2DM, HLD admitted to observation for shortness of breath 2/2 volume overload. Patient reports that she started feeling extremely short of breath with associated chest tightness x 2 days ago. Reports compliance with home oxygen. Her last HD session was on 2/26. She reports that she got tangle her bed sheets and fell out of bed. Endorses head trauma, but is unsure if she lost consciousness. She also complains of RUQ aching abdominal pain that does not radiate for the last 2 days. Denies fever/chills, HA, cough, n/v, diarrhea, constipation, changes in appetite.     In the ED, patient was tachypneic to max 26. O2 sats >94% on 3-4L NC. Remaining vitals stable. K 8.4 (hemolyzed). BNP 4691 (elevated from baseline). Troponin I 0.529. CXR with cardiomegaly and coarsened interstitial lung markings. CTH, CT C spine, CT abd/pelvis pending. Given duoneb x 3, calcium gluconate x1, insulin 5 units x1, sodium bicard 50mEq x1, dextrose 10% 250 mL x1. Nephrology  consulted, patient dialyzed this morning.       Past Medical History:   Diagnosis Date    Anxiety     Breast cancer     left    Carotid artery disease     Cataract     Choledocholithiasis     s/p ERCP and stent placement    Chronic diastolic CHF (congestive heart failure)     Chronic obstructive pulmonary disease     Chronic venous insufficiency     Depression     End-stage renal disease on hemodialysis     M/W/F    GERD (gastroesophageal reflux disease)     History of breast cancer     History of colon cancer 2001    s/p sigmoid colectomy    History of kidney stones     History of osteomyelitis of left foot     History of pancreatitis     History of stroke     Hyperlipidemia     Hypertension     Intracerebral hemorrhage     Lumbar degenerative disc disease     Lumbar spinal stenosis     Morbid obesity     Paroxysmal atrial fibrillation     Peripheral artery disease     Peripheral neuropathy     Tobacco dependence     Type II diabetes mellitus     Urinary incontinence        Past Surgical History:   Procedure Laterality Date    ANGIOGRAM, CORONARY, WITH LEFT HEART CATHETERIZATION N/A 2/7/2022    Procedure: Angiogram, Coronary, with Left Heart Cath;  Surgeon: Adam Rutherford MD;  Location: [facility] CATH LAB;  Service: Cardiology;  Laterality: N/A;    ANGIOGRAPHY OF LOWER EXTREMITY Right 9/17/2020    Procedure: Angiogram Extremity Unilateral;  Surgeon: RICK Pollard III, MD;  Location: [facility] OR [address] [place] [address];  Service: Peripheral Vascular;  Laterality: Right;  6.2 minutes of fluro  690.88 mGy  112.64 Gycm2  55 ml    ANGIOGRAPHY OF LOWER EXTREMITY Left 5/13/2021    Procedure: Angiogram Extremity Unilateral;  Surgeon: HOMERO Hayden II, MD;  Location: [facility] OR [address] [place] [address];  Service: Vascular;  Laterality: Left;  35.1 min  971.41 mGy  190.27 Gy.cm  101ml Dye    ANGIOGRAPHY OF LOWER EXTREMITY Right 7/19/2021    Procedure: Angiogram Extremity Unilateral;  Surgeon: HOMERO Hayden II, MD;  [facility];  Service: Vascular;  Laterality: Right;  3.0 min  121.00 mGy  26.8713 Gy.cm  13ml Dye    AORTOGRAPHY N/A 5/13/2021    Procedure: AORTOGRAM;  Surgeon: HOMERO Hayden II, MD;  [address];  Service: Vascular;  Laterality: N/A;    AV FISTULA PLACEMENT Right 5/28/2018    Procedure: CREATION -FISTULA-AV;  Surgeon: RICK Pollard III, MD;  [address];  Service: Peripheral Vascular;  Laterality: Right;    BREAST BIOPSY  1/2012    left breast invasive mammary carcinoma (lateral bx) and intraductal papilloma (medial bx)    CARDIOVERSION  11/22/2021    CARDIOVERSION  11/22/2021    Procedure: Cardioversion;  Surgeon: Ad Garcia MD;  Location: [facility] CATH LAB;  Service: Cardiology;;    CATARACT EXTRACTION W/  INTRAOCULAR LENS IMPLANT Right 1/24/2019        CATARACT EXTRACTION W/  INTRAOCULAR LENS IMPLANT Left 1/31/2019    Procedure: EXTRACTION, CATARACT, WITH IOL INSERTION;  Surgeon: Immanuel Moncada MD;  Location: [facility];  Service: Ophthalmology;  Laterality: Left;    CHOLECYSTECTOMY  2001    CORONARY ANGIOGRAPHY N/A 2/8/2022    Procedure: ANGIOGRAM, CORONARY ARTERY;  Surgeon: Abelardo Betancur MD;  Location: [facility] CATH LAB;  Service: Cardiology;  Laterality: N/A;    DEBRIDEMENT OF FOOT Right 2/17/2021    Procedure: DEBRIDEMENT, FOOT right plantar ulcer;  Surgeon: Sonja Corral DPM;  Location: [facility];  Service: Podiatry;  Laterality: Right;    ERCP W/ SPHICTEROTOMY      FINE NEEDLE ASPIRATION  1999    Right breast    FOOT AMPUTATION Left 6/1/2021    Procedure: Transmetatarsal amputation;  Surgeon: Billy Robles DPM;  Location: [address];  Service: Podiatry;  Laterality: Left;    FOOT AMPUTATION Left 7/23/2021    Procedure: AMPUTATION, FOOT;  Surgeon: Billy Robles DPM;  Location: [address];  Service: Podiatry;  Laterality: Left;    FOOT AMPUTATION THROUGH METATARSAL Right 10/15/2020    Procedure: PARTIAL RAY AMPUTATION GREAT TOE;   Surgeon: Billy Robles DPM;  Location: [address];  Service: Podiatry;  Laterality: Right;  Stretcher OK    HERNIA REPAIR      LAPAROSCOPIC NEPHRECTOMY Left 2011    MASTECTOMY  2013    left    OOPHORECTOMY Left 2001    REVISION OF ARTERIOVENOUS FISTULA Right 8/15/2018    Procedure: REVISION, AV FISTULA;  Surgeon: RICK Pollard III, MD;  Location: [facility] OR [address] [place] [address];  Service: Peripheral Vascular;  Laterality: Right;    SIGMOIDECTOMY  2001    SURGICAL REMOVAL OF METATARSAL HEAD Right 2/17/2021    Procedure: OSTECTOMY, METATARSAL BONE, diatal 1st;  Surgeon: Sonja Corral DPM;  Location: [facility];  Service: Podiatry;  Laterality: Right;    TOE AMPUTATION Left 5/13/2021    Procedure: AMPUTATION, TOE;  Surgeon: HOMERO Hayden II, MD;  Location: [facility] OR [address] [place] [address];  Service: Vascular;  Laterality: Left;    TOTAL REDUCTION MAMMOPLASTY Right 2013       Review of patient's allergies indicates:   Allergen Reactions    Cyclobenzaprine Hallucinations    Erythromycin      Stomach upset    Keflex [cephalexin]      Yeast infection    Erythromycin base      Other reaction(s): upset stomach       No current facility-administered medications on file prior to encounter.     Current Outpatient Medications on File Prior to Encounter   Medication Sig    acetaminophen-codeine 300-30mg (TYLENOL #3) 300-30 mg Tab Take 1 tablet by mouth every 8 (eight) hours as needed.    albuterol (PROVENTIL/VENTOLIN HFA) 90 mcg/actuation inhaler Inhale 2 puffs into the lungs every 6 (six) hours as needed for Wheezing. Rescue    amiodarone (PACERONE) 200 MG Tab Take 1 tablet (200 mg total) by mouth once daily.    apixaban (ELIQUIS) 2.5 mg Tab Take 1 tablet (2.5 mg total) by mouth 2 (two) times daily.    aspirin (ECOTRIN) 81 MG EC tablet Take 1 tablet (81 mg total) by mouth once daily. Take until 3/8/22, then stop unless further instructed by provider.    atorvastatin (LIPITOR) 40 MG tablet Take 1 tablet (40 mg total) by mouth once  daily.    blood sugar diagnostic Strp 1 strip by Misc.(Non-Drug; Combo Route) route 4 (four) times daily.    blood-glucose sensor (DEXCOM G6 SENSOR) Umm 1 each by Misc.(Non-Drug; Combo Route) route every 10 days. (Patient not taking: Reported on 2/21/2022)    blood-glucose transmitter (DEXCOM G6 TRANSMITTER) Umm 1 Device by Misc.(Non-Drug; Combo Route) route continuous. (Patient not taking: Reported on 2/21/2022)    clopidogreL (PLAVIX) 75 mg tablet Take 1 tablet (75 mg total) by mouth once daily.    fish oil-omega-3 fatty acids 300-1,000 mg capsule Take 1 capsule by mouth every morning.    flash glucose scanning reader (FREESTYLE LULU 14 DAY READER) Misc Use as directed to check blood sugars (Patient not taking: Reported on 2/21/2022)    flash glucose scanning reader (FREESTYLE LULU 2 READER) Misc 1 Units by Misc.(Non-Drug; Combo Route) route continuous prn. (Patient not taking: Reported on 2/21/2022)    flash glucose sensor (FREESTYLE LULU 14 DAY SENSOR) Kit Use as directed to check blood sugars (Patient not taking: Reported on 2/21/2022)    flash glucose sensor (FREESTYLE LULU 2 SENSOR) Kit 1 Units by Misc.(Non-Drug; Combo Route) route every 14 (fourteen) days. (Patient not taking: Reported on 2/21/2022)    insulin aspart U-100 (NOVOLOG) 100 unit/mL (3 mL) InPn pen Inject 3 Units into the skin 3 (three) times daily with meals.    insulin detemir U-100 (LEVEMIR FLEXTOUCH) 100 unit/mL (3 mL) SubQ InPn pen Inject 4 Units into the skin 2 (two) times daily.    lancets (ONETOUCH DELICA LANCETS) 33 gauge Misc 1 lancet by Misc.(Non-Drug; Combo Route) route 4 (four) times daily before meals and nightly.    methocarbamoL (ROBAXIN) 500 MG Tab Take 1 tablet (500 mg total) by mouth 3 (three) times daily.    metoprolol tartrate (LOPRESSOR) 25 MG tablet Take 1 tablet (25 mg total) by mouth 2 (two) times daily.    nitroGLYCERIN (NITROSTAT) 0.3 MG SL tablet Place 1 tablet (0.3 mg total) under the tongue every  5 (five) minutes as needed for Chest pain.    senna-docusate 8.6-50 mg (PERICOLACE) 8.6-50 mg per tablet Take 1 tablet by mouth 2 (two) times daily. (Patient not taking: No sig reported)    sevelamer carbonate (RENVELA) 800 mg Tab Take 2 tablets (1,600 mg total) by mouth 3 (three) times daily with meals. (Patient not taking: No sig reported)     Family History       Problem Relation (Age of Onset)    Arthritis Mother    Breast cancer Maternal Grandmother    Cancer Maternal Grandmother, Maternal Aunt    Celiac disease Sister    Diabetes Father    Heart disease Mother, Father, Maternal Grandmother          Tobacco Use    Smoking status: Current Some Day Smoker     Packs/day: 0.50     Years: 28.00     Pack years: 14.00     Types: Cigarettes     Start date: 1990     Last attempt to quit: 2018     Years since quittin.1    Smokeless tobacco: Never Used    Tobacco comment: anxious   Substance and Sexual Activity    Alcohol use: No    Drug use: No    Sexual activity: Not Currently     Partners: Male     Review of Systems   Constitutional:  Positive for activity change and fatigue. Negative for chills, diaphoresis and fever.   HENT:  Negative for congestion, facial swelling, rhinorrhea and sore throat.    Eyes:  Negative for photophobia, itching and visual disturbance.   Respiratory:  Positive for chest tightness. Negative for cough, shortness of breath and wheezing.    Cardiovascular:  Negative for chest pain, palpitations and leg swelling.   Gastrointestinal:  Negative for abdominal distention, abdominal pain, blood in stool, constipation, diarrhea, nausea and vomiting.   Genitourinary:  Negative for difficulty urinating, dysuria, frequency, hematuria and urgency.   Musculoskeletal:  Negative for arthralgias, back pain and neck stiffness.   Neurological:  Negative for dizziness, tremors, seizures, syncope, weakness, light-headedness, numbness and headaches.   Psychiatric/Behavioral:  Negative for  agitation, confusion and hallucinations.    Objective:     Vital Signs (Most Recent):  Temp: 98 °F (36.7 °C) (03/04/22 0712)  Pulse: 70 (03/04/22 1301)  Resp: 18 (03/04/22 1301)  BP: 91/64 (03/04/22 1101)  SpO2: 96 % (03/04/22 1301) Vital Signs (24h Range):  Temp:  [97.7 °F (36.5 °C)-98 °F (36.7 °C)] 98 °F (36.7 °C)  Pulse:  [] 70  Resp:  [17-26] 18  SpO2:  [84 %-100 %] 96 %  BP: ()/(51-90) 91/64     Weight: 107 kg (236 lb)  Body mass index is 40.51 kg/m².    Physical Exam  Vitals and nursing note reviewed.   Constitutional:       General: She is not in acute distress.     Appearance: She is well-developed. She is obese. She is not diaphoretic.   HENT:      Head: Normocephalic and atraumatic.      Right Ear: External ear normal.      Left Ear: External ear normal.      Nose: Nose normal. No congestion.      Mouth/Throat:      Pharynx: Oropharynx is clear.   Eyes:      General: No scleral icterus.     Extraocular Movements: Extraocular movements intact.   Cardiovascular:      Rate and Rhythm: Normal rate and regular rhythm.      Pulses: Normal pulses.      Heart sounds: Normal heart sounds. No murmur heard.  Pulmonary:      Effort: Pulmonary effort is normal. No respiratory distress.      Breath sounds: Normal breath sounds. No wheezing or rales.   Abdominal:      General: Bowel sounds are normal. There is no distension.      Palpations: Abdomen is soft.      Tenderness: There is no abdominal tenderness. There is no guarding or rebound.   Musculoskeletal:      Cervical back: Normal range of motion.      Right lower leg: No edema.      Left lower leg: No edema.   Skin:     General: Skin is warm and dry.      Capillary Refill: Capillary refill takes less than 2 seconds.   Neurological:      General: No focal deficit present.      Mental Status: She is oriented to person, place, and time and easily aroused. Mental status is at baseline. She is lethargic.   Psychiatric:         Mood and Affect: Mood normal.          Behavior: Behavior normal.         Thought Content: Thought content normal.           Significant Labs: All pertinent labs within the past 24 hours have been reviewed.  CBC:   Recent Labs   Lab 03/04/22  0009 03/04/22  0033 03/04/22  0521   WBC 6.67  --   --    HGB 12.0  --   --    HCT 39.3 40 34*     --   --      CMP:   Recent Labs   Lab 03/04/22  0009 03/04/22  1042   * 135*   K 8.4* 5.0   CL 95 95   CO2 19* 27   * 84   BUN 94* 44*   CREATININE 11.9* 7.4*   CALCIUM 8.7 8.2*   PROT 8.1  --    ALBUMIN 3.9 3.0*   BILITOT 1.0  --    ALKPHOS 120  --    AST 33  --    ALT 21  --    ANIONGAP 21* 13   EGFRNONAA 2.9* 5.2*     Cardiac Markers:   Recent Labs   Lab 03/04/22  0009   BNP 4,691*     Troponin:   Recent Labs   Lab 03/04/22  0009 03/04/22  0652   TROPONINI 0.529* 0.391*       Significant Imaging: I have reviewed all pertinent imaging results/findings within the past 24 hours.  Imaging Results              CT Abdomen Pelvis With Contrast (Final result)  Result time 03/04/22 06:58:39      Final result by Rajiv Velazquez MD (03/04/22 06:58:39)                   Impression:      1. No detrimental change or acute process seen within the abdomen or pelvis.  2. Slightly improved bibasilar mild dependent atelectasis.  3. Hepatosplenomegaly.  4. Status post remote nephrectomy.  Multiple stable prominent to mildly enlarged periaortic and left inguinal lymph nodes.  No mass or fluid collection within the nephrectomy bed.  5. Grossly stable bilateral adrenal nodules.  6. Slight interval progressed healing of subacute right inferior pubic ramus fracture.  7. Grossly stable additional findings as above.      Electronically signed by: Rajiv Velazquez MD  Date:    03/04/2022  Time:    06:58               Narrative:    EXAMINATION:  CT ABDOMEN PELVIS WITH CONTRAST    CLINICAL HISTORY:  Abdominal pain, acute, nonlocalized;    TECHNIQUE:  Low dose axial images, sagittal and coronal reformations were obtained  from the lung bases to the pubic symphysis following the IV administration of 100 mL of Omnipaque 350 .  Oral contrast was not given.    COMPARISON:  CT abdomen and pelvis 02/17/2022 and abdominal radiograph 02/18/2022; MRI pelvis 08/14/2019, renal ultrasound 04/20/2018, abdominal ultrasound 03/20/2018    FINDINGS:  Patient is rotated and tilted within the scanner.  Beam hardening with streak artifact from overlying monitoring leads and adjacent upper extremities somewhat limits evaluation.    Base of the heart appears mildly enlarged with multi-vessel coronary arterial calcifications but no significant pericardial fluid.  Partially imaged left breast implant again noted.    Imaged lung bases show mild dependent atelectasis slightly improved from prior with scattered areas of platelike scarring versus atelectasis.  No new consolidation or pleural effusion seen.    Liver is mildly enlarged without focal parenchymal lesion seen.  Remote cholecystectomy.  Grossly stable dilated common bile duct.  No significant intrahepatic biliary ductal dilatation.  Spleen is mildly enlarged without focal process seen.  Grossly stable bilateral adrenal small nodules.  Pancreas, stomach and duodenum are within normal limits.    Right kidney is atrophic consistent with end-stage renal disease, unchanged.  No hydronephrosis.  Grossly stable nonspecific right perinephric stranding.  Grossly stable subcentimeter hypoattenuating parenchymal foci which are too small to characterize but likely represent cysts.  No large radiodense nephrolith.  Status post left nephrectomy.  No new soft tissue density mass or fluid collection within the nephrectomy bed.    Grossly stable multiple prominent to mildly enlarged periaortic lymph nodes, more so on the left side.  No enlarged mesenteric or right inguinal lymph nodes by CT criteria.  Grossly stable prominent to mildly enlarged left inguinal lymph nodes.    Urinary bladder is suboptimally distended  with circumferential wall thickening.  Uterus and bilateral adnexal regions are grossly stable.  Pelvic phleboliths noted.  No significant amount of free fluid seen in the pelvis.    No evidence of bowel obstruction or inflammation.  No pneumatosis or portal venous gas.    There is trace volume free fluid about the inferior right hepatic lobe, unchanged.  No intraperitoneal free air.    Severe calcific atherosclerosis of the aorta and its branch vessels.  No aortic aneurysm or dissection.    Moderate diffuse body wall edema not significantly changed.    Slight interval healing of known right inferior pubic ramus fracture.  No new acute displaced fracture-dislocation or destructive osseous process seen.  Overall osseous structures appear stable elsewhere.                                       CT Head Without Contrast (Final result)  Result time 03/04/22 07:07:13      Final result by Rajiv Velazquez MD (03/04/22 07:07:13)                   Impression:      No acute intracranial hemorrhage or recent major vascular territory infarct.    No acute fracture or traumatic malalignment of the cervical spine.    Remote lacunar-type infarcts of the right basal ganglia and thalamus.    Cerebral volume loss and findings suggestive of moderate chronic microvascular ischemic change.    Degenerative changes of the cervical spine.  Up to severe neural foraminal narrowing at C3-C4 and C4-C5.  No high-grade spinal canal stenosis.    Electronically signed by resident: Bobby Montanez  Date:    03/04/2022  Time:    06:32    Electronically signed by: Rajiv Velazquez MD  Date:    03/04/2022  Time:    07:07               Narrative:    EXAMINATION:  CT HEAD WITHOUT CONTRAST; CT CERVICAL SPINE WITHOUT CONTRAST    CLINICAL HISTORY:  Head trauma, minor (Age >= 65y);; Neck trauma (Age >= 65y);    TECHNIQUE:  Low dose axial CT images obtained throughout the head and cervical spine without intravenous contrast. Sagittal and coronal reconstructions were  performed.    COMPARISON:  CT head 02/17/2022; CT cervical spine 07/13/2021    FINDINGS:  Mild cerebral volume loss.  Ventricles are stable in size and configuration.  No evidence of acute hydrocephalus.  No extra-axial blood or fluid collections.    Unchanged right basal ganglia and thalamus remote lacunar type infarcts.  Periventricular white matter hypoattenuation while nonspecific compatible with moderate chronic microvascular ischemic change.  No parenchymal mass, hemorrhage, edema or major vascular distribution infarct.    No displaced calvarial fracture.  Skull base calcific atherosclerosis.  Mastoid air cells and visualized paranasal sinuses are essentially clear.    Generalized osteopenia.  Craniocervical junction is intact.  Moderate degenerative changes at the atlantodental interval.  Pre dens space is maintained.  Generalized osteopenic changes of the cervical spine.  No acute fracture or dislocation.  There is straightening of the cervical lordosis.  Near or total osseous fusion of the bilateral facets at C3-C4.  Minimal anterolisthesis C4 on C5, C5 on C6, and T1 on T2 approximally 2 mm.  Vertebral body heights are stable.  Mild multilevel intervertebral disc height loss and degenerative changes.    C2-C3: Facet joint arthropathy greater on the left contributing to mild left neural foraminal narrowing..    C3-C4: Facet joint arthropathy contributing to severe left and moderate right neural foraminal narrowing.    C4-C5: Facet joint arthropathy greater on the right and posterior disc osteophyte complex contributing to severe right and mild left neural foraminal narrowing.    C5-C6: No significant neural foraminal narrowing or spinal canal stenosis.    C6-C7: Posterior disc osteophyte complex resulting in effacement the anterior thecal sac.    C7-T1: Facet joint arthropathy greater on the right.  No significant neural foraminal or spinal canal stenosis.    Miscellaneous: Motion artifact limits evaluation  of the lung parenchyma however no large focal consolidation.  Moderate calcific atherosclerosis of the aortic arch and carotid bifurcations.  Thyroid is unremarkable.  No cervical lymphadenopathy.                                       CT Cervical Spine Without Contrast (Final result)  Result time 03/04/22 07:07:13      Final result by Rajiv Velazquez MD (03/04/22 07:07:13)                   Impression:      No acute intracranial hemorrhage or recent major vascular territory infarct.    No acute fracture or traumatic malalignment of the cervical spine.    Remote lacunar-type infarcts of the right basal ganglia and thalamus.    Cerebral volume loss and findings suggestive of moderate chronic microvascular ischemic change.    Degenerative changes of the cervical spine.  Up to severe neural foraminal narrowing at C3-C4 and C4-C5.  No high-grade spinal canal stenosis.    Electronically signed by resident: Bobby Montanez  Date:    03/04/2022  Time:    06:32    Electronically signed by: Rajiv Velazquez MD  Date:    03/04/2022  Time:    07:07               Narrative:    EXAMINATION:  CT HEAD WITHOUT CONTRAST; CT CERVICAL SPINE WITHOUT CONTRAST    CLINICAL HISTORY:  Head trauma, minor (Age >= 65y);; Neck trauma (Age >= 65y);    TECHNIQUE:  Low dose axial CT images obtained throughout the head and cervical spine without intravenous contrast. Sagittal and coronal reconstructions were performed.    COMPARISON:  CT head 02/17/2022; CT cervical spine 07/13/2021    FINDINGS:  Mild cerebral volume loss.  Ventricles are stable in size and configuration.  No evidence of acute hydrocephalus.  No extra-axial blood or fluid collections.    Unchanged right basal ganglia and thalamus remote lacunar type infarcts.  Periventricular white matter hypoattenuation while nonspecific compatible with moderate chronic microvascular ischemic change.  No parenchymal mass, hemorrhage, edema or major vascular distribution infarct.    No displaced calvarial  fracture.  Skull base calcific atherosclerosis.  Mastoid air cells and visualized paranasal sinuses are essentially clear.    Generalized osteopenia.  Craniocervical junction is intact.  Moderate degenerative changes at the atlantodental interval.  Pre dens space is maintained.  Generalized osteopenic changes of the cervical spine.  No acute fracture or dislocation.  There is straightening of the cervical lordosis.  Near or total osseous fusion of the bilateral facets at C3-C4.  Minimal anterolisthesis C4 on C5, C5 on C6, and T1 on T2 approximally 2 mm.  Vertebral body heights are stable.  Mild multilevel intervertebral disc height loss and degenerative changes.    C2-C3: Facet joint arthropathy greater on the left contributing to mild left neural foraminal narrowing..    C3-C4: Facet joint arthropathy contributing to severe left and moderate right neural foraminal narrowing.    C4-C5: Facet joint arthropathy greater on the right and posterior disc osteophyte complex contributing to severe right and mild left neural foraminal narrowing.    C5-C6: No significant neural foraminal narrowing or spinal canal stenosis.    C6-C7: Posterior disc osteophyte complex resulting in effacement the anterior thecal sac.    C7-T1: Facet joint arthropathy greater on the right.  No significant neural foraminal or spinal canal stenosis.    Miscellaneous: Motion artifact limits evaluation of the lung parenchyma however no large focal consolidation.  Moderate calcific atherosclerosis of the aortic arch and carotid bifurcations.  Thyroid is unremarkable.  No cervical lymphadenopathy.                                       X-Ray Chest 1 View (Final result)  Result time 03/04/22 00:31:19      Final result by Dirk Anderson MD (03/04/22 00:31:19)                   Impression:      Cardiomegaly and coarsened interstitial lung markings which could be related to interstitial edema or chronic change.  No detrimental change in lung aeration  "when compared with 02/17/2022.      Electronically signed by: Dirk Anderson MD  Date:    03/04/2022  Time:    00:31               Narrative:    EXAMINATION:  XR CHEST 1 VIEW    CLINICAL HISTORY:  Provided history is "shortness of breath;  ".    TECHNIQUE:  One view of the chest.    COMPARISON:  02/17/2022.    FINDINGS:  Cardiac wires overlie the chest.  Patient is rotated.  Cardiomediastinal silhouette is enlarged and similar to the prior study.  Atherosclerotic calcifications overlie the aortic arch.  There is increased attenuation overlying the left mid and lower lung zone, similar to the prior study, potentially exaggerated by unilateral left-sided breast prosthesis.  Underlying infectious/inflammatory process or interstitial edema not excluded.  There are persistent coarsened bilateral interstitial lung markings, not significantly changed when compared with the prior study.  No large pleural effusion.  No pneumothorax.                                        Assessment/Plan:     * End-stage renal disease on hemodialysis  -HD 3/4/22  -avoid nephrotoxins  -nephrology consulted for HD needs  -3L UF  -EKG, Calcium gluconate, and shifting if K>6.0  -continue to monitor      Hyperkalemia  -on admission 8.4 in setting of ESRD  -shifted w/ albuterol x 3, calcium gluconate x1, insulin 5 units x1, improved to 5  -continue to monitor    Hypertension  -homemetoprolol tartrate 25 mg bid held in setting of hypotension      Type II diabetes mellitus  -hold home regimen  -starting insulin at home dose as she is not hyperglycemic. detemir 4 units bid, aspart 3 units tidwm, low dose SSI  -q4h accuchecks  -diabetic diet      NSTEMI (non-ST elevated myocardial infarction)  -continue asa, plavix, metoprolol tartrate 25 mg bid      Atrial fibrillation  Patient with Paroxysmal (<7 days) atrial fibrillation which is controlled currently with Beta Blocker and Amiodarone. Patient is currently in sinus rhythm.[id number] Score: 4. " Anticoagulation indicated. Anticoagulation done with eliquis 2.5 mg qd.        Anemia in ESRD (end-stage renal disease)  -hgb 12, stable  -transfuse hgb <7  -continue to monitor      Chronic respiratory failure with hypoxia  -prn O2 NC  -duonebs prn    Secondary hyperparathyroidism  Hyperphosphatemia  -continue sevelamer 800 tidwm    COPD (chronic obstructive pulmonary disease)  -stable  -prn duonebs      Morbid obesity  Body mass index is 40.51 kg/m². Morbid obesity complicates all aspects of disease management from diagnostic modalities to treatment. Weight loss encouraged and health benefits explained to patient.         Hyperlipidemia  -continue atorvastatin 40 mg qd      VTE Risk Mitigation (From admission, onward)         Ordered     Reason for No Pharmacological VTE Prophylaxis  Once        Question:  Reasons:  Answer:  Physician Provided (leave comment)  Comment:  pending CTH    03/04/22 0642     IP VTE HIGH RISK PATIENT  Once         03/04/22 0642     Place sequential compression device  Until discontinued         03/04/22 0642                   Joi Willard PA-C  Department of Hospital Medicine   [facility] - Emergency Dept

## 2022-03-05 NOTE — HOSPITAL COURSE
Pt placed in observation for hyperkalemia. K elevated to 8.4 on admission. Hypervolemia noted on CXR w/ coarsened interstitial lung marking. Pt was shifted in the ED and nephrology was consulted for urgent HD needs. 3L UF w/ improvement of hypervolemia. CT head showed no acute intracranial hemorrhage or recent major vascular territory infarct. CT C spine showed no acute fracture or traumatic malalignment of the c spine. CT abd/pelvis showed no detrimental change or acute process seen within the abdomen or pelvis.   Nephrology continued following for HD needs (2 sessions IP). PTOT consulted recommended HH OT and PT. Pt medically ready for discharge. Instructed to maintain HD appointment scheduled for the following day 3/8/22. Pt given referrals for care at home and ready responders. HH orders renewed. Pt educated on hospital course and plan, verbally agrees and understands. All questions answered.

## 2022-03-05 NOTE — ASSESSMENT & PLAN NOTE
Body mass index is 40.49 kg/m². Morbid obesity complicates all aspects of disease management from diagnostic modalities to treatment. Weight loss encouraged and health benefits explained to patient.

## 2022-03-05 NOTE — ASSESSMENT & PLAN NOTE
Patient with Paroxysmal (<7 days) atrial fibrillation which is controlled currently with Beta Blocker and Amiodarone. Patient is currently in sinus rhythm.[id number] Score: 4. Anticoagulation indicated. Anticoagulation done with eliquis 2.5 mg qd.

## 2022-03-05 NOTE — SUBJECTIVE & OBJECTIVE
Interval History: NAEON. Pt seen and evaluated at bedside this am. Pt reports feeling well. Pt will receive scheduled HD today. PTOT evaluation. Pt is likely a candidate for placement.    Review of Systems   Constitutional:  Positive for activity change and fatigue. Negative for chills, diaphoresis and fever.   HENT:  Negative for congestion, facial swelling, rhinorrhea and sore throat.    Eyes:  Negative for photophobia, itching and visual disturbance.   Respiratory:  Negative for cough, chest tightness, shortness of breath and wheezing.    Cardiovascular:  Negative for chest pain, palpitations and leg swelling.   Gastrointestinal:  Negative for abdominal distention, abdominal pain, blood in stool, constipation, diarrhea, nausea and vomiting.   Genitourinary:  Negative for difficulty urinating, dysuria, frequency, hematuria and urgency.   Musculoskeletal:  Negative for arthralgias, back pain and neck stiffness.   Neurological:  Negative for dizziness, tremors, seizures, syncope, weakness, light-headedness, numbness and headaches.   Psychiatric/Behavioral:  Negative for agitation, confusion and hallucinations.    Objective:     Vital Signs (Most Recent):  Temp: 98.1 °F (36.7 °C) (03/05/22 0935)  Pulse: 60 (03/05/22 1214)  Resp: 20 (03/05/22 0935)  BP: 118/65 (03/05/22 1214)  SpO2: (!) 94 % (03/05/22 0808)   Vital Signs (24h Range):  Temp:  [98.1 °F (36.7 °C)-98.4 °F (36.9 °C)] 98.1 °F (36.7 °C)  Pulse:  [53-72] 60  Resp:  [17-21] 20  SpO2:  [84 %-98 %] 94 %  BP: ()/(55-82) 118/65     Weight: 107 kg (235 lb 14.3 oz)  Body mass index is 40.49 kg/m².    Intake/Output Summary (Last 24 hours) at 3/5/2022 1217  Last data filed at 3/5/2022 0830  Gross per 24 hour   Intake 240 ml   Output --   Net 240 ml      Physical Exam  Vitals and nursing note reviewed.   Constitutional:       General: She is not in acute distress.     Appearance: She is well-developed. She is obese. She is not diaphoretic.   HENT:      Head:  Normocephalic and atraumatic.      Right Ear: External ear normal.      Left Ear: External ear normal.      Nose: Nose normal. No congestion.      Mouth/Throat:      Pharynx: Oropharynx is clear.   Eyes:      General: No scleral icterus.     Extraocular Movements: Extraocular movements intact.   Cardiovascular:      Rate and Rhythm: Normal rate and regular rhythm.      Pulses: Normal pulses.      Heart sounds: Normal heart sounds. No murmur heard.  Pulmonary:      Effort: Pulmonary effort is normal. No respiratory distress.      Breath sounds: Normal breath sounds. No wheezing or rales.   Abdominal:      General: Bowel sounds are normal. There is no distension.      Palpations: Abdomen is soft.      Tenderness: There is no abdominal tenderness. There is no guarding or rebound.   Musculoskeletal:      Cervical back: Normal range of motion.      Right lower leg: No edema.      Left lower leg: No edema.   Skin:     General: Skin is warm and dry.      Capillary Refill: Capillary refill takes less than 2 seconds.   Neurological:      General: No focal deficit present.      Mental Status: She is alert, oriented to person, place, and time and easily aroused. Mental status is at baseline.   Psychiatric:         Mood and Affect: Mood normal.         Behavior: Behavior normal. Behavior is cooperative.         Thought Content: Thought content normal.       Significant Labs: All pertinent labs within the past 24 hours have been reviewed.    Significant Imaging: I have reviewed all pertinent imaging results/findings within the past 24 hours.

## 2022-03-05 NOTE — PROGRESS NOTES
[facility] - Telemetry Stepdown ([address] Medicine  Progress Note    Patient Name: Kylie King  MRN: [id number]  Patient Class: OP- Observation   Admission Date: 3/3/2022  Length of Stay: 0 days  Attending Physician: Ousmane Vera MD  Primary Care Provider: Virginia Foote MD        Subjective:     Principal Problem:End-stage renal disease on hemodialysis        HPI:  Kylie King is a 66 y.o. year old female with PMHx significant for CAD s/p PCI on 02/08/2022 (on DAPT), left breast cancer s/p mastectomy, chronic respiratory failure (on home 2L of O2), COPD, ESRD on HD TTS w/ anemia of chronic disease, Atrial Fib (failed DCCV in Nov 2021); on eliquis and amiodarone, CVA, PVD (s/p left foot amputation on Plavix), HTN, T2DM, HLD admitted to observation for shortness of breath 2/2 volume overload. Patient reports that she started feeling extremely short of breath with associated chest tightness x 2 days ago. Reports compliance with home oxygen. Her last HD session was on 2/26. She reports that she got tangle her bed sheets and fell out of bed. Endorses head trauma, but is unsure if she lost consciousness. She also complains of RUQ aching abdominal pain that does not radiate for the last 2 days. Denies fever/chills, HA, cough, n/v, diarrhea, constipation, changes in appetite.     In the ED, patient was tachypneic to max 26. O2 sats >94% on 3-4L NC. Remaining vitals stable. K 8.4 (hemolyzed). BNP 4691 (elevated from baseline). Troponin I 0.529. CXR with cardiomegaly and coarsened interstitial lung markings. CTH, CT C spine, CT abd/pelvis pending. Given duoneb x 3, calcium gluconate x1, insulin 5 units x1, sodium bicard 50mEq x1, dextrose 10% 250 mL x1. Nephrology consulted, patient dialyzed this morning.       Overview/Hospital Course:  Pt placed in observation for hyperkalemia. K elevated to 8.4 on admission. Hypervolemia noted on CXR w/ coarsened interstitial lung marking. Pt was shifted in the  ED and nephrology was consulted for urgent HD needs. 3L UF w/ improvement of hypervolemia. CT head showed no acute intracranial hemorrhage or recent major vascular territory infarct. CT C spine showed no acute fracture or traumatic malalignment of the c spine. CT abd/pelvis showed no detrimental change or acute process seen within the abdomen or pelvis.   Nephrology continued following for HD needs. PTOT consulted for concern of need for post hospital placement.       Interval History: NAEON. Pt seen and evaluated at bedside this am. Pt reports feeling well. Pt will receive scheduled HD today. PTOT evaluation. Pt is likely a candidate for placement.    Review of Systems   Constitutional:  Positive for activity change and fatigue. Negative for chills, diaphoresis and fever.   HENT:  Negative for congestion, facial swelling, rhinorrhea and sore throat.    Eyes:  Negative for photophobia, itching and visual disturbance.   Respiratory:  Negative for cough, chest tightness, shortness of breath and wheezing.    Cardiovascular:  Negative for chest pain, palpitations and leg swelling.   Gastrointestinal:  Negative for abdominal distention, abdominal pain, blood in stool, constipation, diarrhea, nausea and vomiting.   Genitourinary:  Negative for difficulty urinating, dysuria, frequency, hematuria and urgency.   Musculoskeletal:  Negative for arthralgias, back pain and neck stiffness.   Neurological:  Negative for dizziness, tremors, seizures, syncope, weakness, light-headedness, numbness and headaches.   Psychiatric/Behavioral:  Negative for agitation, confusion and hallucinations.    Objective:     Vital Signs (Most Recent):  Temp: 98.1 °F (36.7 °C) (03/05/22 0935)  Pulse: 60 (03/05/22 1214)  Resp: 20 (03/05/22 0935)  BP: 118/65 (03/05/22 1214)  SpO2: (!) 94 % (03/05/22 0808)   Vital Signs (24h Range):  Temp:  [98.1 °F (36.7 °C)-98.4 °F (36.9 °C)] 98.1 °F (36.7 °C)  Pulse:  [53-72] 60  Resp:  [17-21] 20  SpO2:  [84 %-98 %]  94 %  BP: ()/(55-82) 118/65     Weight: 107 kg (235 lb 14.3 oz)  Body mass index is 40.49 kg/m².    Intake/Output Summary (Last 24 hours) at 3/5/2022 1217  Last data filed at 3/5/2022 0830  Gross per 24 hour   Intake 240 ml   Output --   Net 240 ml      Physical Exam  Vitals and nursing note reviewed.   Constitutional:       General: She is not in acute distress.     Appearance: She is well-developed. She is obese. She is not diaphoretic.   HENT:      Head: Normocephalic and atraumatic.      Right Ear: External ear normal.      Left Ear: External ear normal.      Nose: Nose normal. No congestion.      Mouth/Throat:      Pharynx: Oropharynx is clear.   Eyes:      General: No scleral icterus.     Extraocular Movements: Extraocular movements intact.   Cardiovascular:      Rate and Rhythm: Normal rate and regular rhythm.      Pulses: Normal pulses.      Heart sounds: Normal heart sounds. No murmur heard.  Pulmonary:      Effort: Pulmonary effort is normal. No respiratory distress.      Breath sounds: Normal breath sounds. No wheezing or rales.   Abdominal:      General: Bowel sounds are normal. There is no distension.      Palpations: Abdomen is soft.      Tenderness: There is no abdominal tenderness. There is no guarding or rebound.   Musculoskeletal:      Cervical back: Normal range of motion.      Right lower leg: No edema.      Left lower leg: No edema.   Skin:     General: Skin is warm and dry.      Capillary Refill: Capillary refill takes less than 2 seconds.   Neurological:      General: No focal deficit present.      Mental Status: She is alert, oriented to person, place, and time and easily aroused. Mental status is at baseline.   Psychiatric:         Mood and Affect: Mood normal.         Behavior: Behavior normal. Behavior is cooperative.         Thought Content: Thought content normal.       Significant Labs: All pertinent labs within the past 24 hours have been reviewed.    Significant Imaging: I have  reviewed all pertinent imaging results/findings within the past 24 hours.      Assessment/Plan:      * End-stage renal disease on hemodialysis  -HD 3/4/22  -avoid nephrotoxins  -nephrology consulted for HD needs  -3L UF  -EKG, Calcium gluconate, and shifting if K>6.0  -continue to monitor      Hyperkalemia  -on admission 8.4 in setting of ESRD  -shifted w/ albuterol x 3, calcium gluconate x1, insulin 5 units x1, improved to 5  -continue to monitor    Hypertension  -home metoprolol tartrate 25 mg bid held in setting of hypotension      Type II diabetes mellitus  -hold home regimen  -starting insulin at home dose as she is not hyperglycemic. detemir 4 units bid, aspart 3 units tidwm, low dose SSI  -q4h accuchecks  -diabetic diet      NSTEMI (non-ST elevated myocardial infarction)  -continue asa, plavix, metoprolol tartrate 25 mg bid      Atrial fibrillation  Patient with Paroxysmal (<7 days) atrial fibrillation which is controlled currently with Beta Blocker and Amiodarone. Patient is currently in sinus rhythm.[id number] Score: 4. Anticoagulation indicated. Anticoagulation done with eliquis 2.5 mg qd.        Anemia in ESRD (end-stage renal disease)  -hgb 12, stable  -transfuse hgb <7  -continue to monitor      Chronic respiratory failure with hypoxia  -prn O2 NC  -duonebs prn    Secondary hyperparathyroidism  Hyperphosphatemia  -continue sevelamer 800 tidwm    Hyperphosphatemia        COPD (chronic obstructive pulmonary disease)  -stable  -prn duonebs      Morbid obesity  Body mass index is 40.49 kg/m². Morbid obesity complicates all aspects of disease management from diagnostic modalities to treatment. Weight loss encouraged and health benefits explained to patient.         Hyperlipidemia  -continue atorvastatin 40 mg qd        VTE Risk Mitigation (From admission, onward)         Ordered     heparin (porcine) injection 1,000 Units  As needed (PRN)         03/04/22 8212     Reason for No Pharmacological VTE  Prophylaxis  Once        Question:  Reasons:  Answer:  Physician Provided (leave comment)  Comment:  pending CTH    03/04/22 0642     IP VTE HIGH RISK PATIENT  Once         03/04/22 0642     Place sequential compression device  Until discontinued         03/04/22 0642                Discharge Planning   UMAIR: 3/9/2022     Code Status: Full Code   Is the patient medically ready for discharge?: No    Reason for patient still in hospital (select all that apply): Patient trending condition, Laboratory test, Treatment, Consult recommendations, PT / OT recommendations and Pending disposition                     Joi Willard PA-C  Department of Hospital Medicine   [facility] - Telemetry Stepdown ([place])

## 2022-03-05 NOTE — PT/OT/SLP PROGRESS
"Occupational Therapy      Patient Name:  Kylie King   MRN:  [id number]    Patient not seen today secondary to patient going off the unit to dialysis during first attempt. Patient then politely declined therapy and stated "tomorrow" . Will follow-up for therapy as scheduled for an evaluation.    3/5/2022  "

## 2022-03-05 NOTE — PLAN OF CARE
Pt aaox4, reports diarrhea w/ associated discomfort. Reviewed POC and fall precautions w/ pt, verbalized understanding. Needs addressed. No events overnight.    Problem: Adult Inpatient Plan of Care  Goal: Optimal Comfort and Wellbeing  Outcome: Ongoing, Progressing     Problem: Bariatric Environmental Safety  Goal: Safety Maintained with Care  Outcome: Ongoing, Progressing

## 2022-03-05 NOTE — NURSING
Pt arrived to unit via stretcher from ED. Pt AAOx4. Pt denies pain or SOB upon arrival. Pt updated on plan of care. Bed in low position with call light within reach. Pt instructed to call for assistance prior to getting out of bed. Pt placed in non skid slip resistant socks.

## 2022-03-05 NOTE — PT/OT/SLP PROGRESS
Physical Therapy      Patient Name:  Kylie King   MRN:  [id number]    Attempted to see patient for PT; however patient frustrated with social questions, stating 'you can find all the answers you need in my chart'. Upon further questioning, pt stating she has no desires to get out of bed at this time and requested PT not to f/u back up in the PM on this date, stating 'maybe tomorrow'. Physical therapy will follow-up as able.    Meri Ellis, PT, DPT, GCS  3/5/2022

## 2022-03-05 NOTE — PROGRESS NOTES
Pt arrived to [facility] in her bed.  Dialysis started to right arm fistula with 15 gauge needles..  , .  Pt tolerated without difficulty.  Received report from Palma, primary nurse.

## 2022-03-05 NOTE — PROGRESS NOTES
[facility]FF HEMODIALYSIS NOTE     Patient currently on hemodialysis for removal of uremic toxins and volume.     Patient seen and evaluated on hemodialysis, tolerating treatment, see HD flowsheet for vitals and assessments.    Labs have been reviewed and the dialysate bath has been adjusted.       Assessment/Plan:    -Patient seen on HD, tolerating treatment well, w/o complaints   -UF goal of 2-3L   -Renal diet, if not NPO   -Strict I/O's and daily weights  -Daily renal function panels  -Keep MAP >65 while on HD   -Maintain Hgb >7.0  -Will continue to follow while inpatient     Delia Jalloh DNP-FNP, C  Nephrology  Pager: [phone]

## 2022-03-05 NOTE — PROGRESS NOTES
Pt completed 3.5 hours dialysis via right arm fistula.  Needles removed, pressure held, pt with profuse bleeding from needle sites, pt stated she is on multiple blood thinners.  Hemostasis achieved after close to 1.5 hours of holding pressure with gel foam in place.  Guaze dressing in place and taped.  Net fluid removal 1140cc.  Pt returned to her room by transport.  Report called to Palma, primary nurse.

## 2022-03-06 NOTE — PROGRESS NOTES
[facility] - Telemetry Stepdown ([address] Medicine  Progress Note    Patient Name: Kylie King  MRN: [id number]  Patient Class: OP- Observation   Admission Date: 3/3/2022  Length of Stay: 0 days  Attending Physician: Ousmane Vera MD  Primary Care Provider: Virginia Foote MD        Subjective:     Principal Problem:End-stage renal disease on hemodialysis        HPI:  Kylie King is a 66 y.o. year old female with PMHx significant for CAD s/p PCI on 02/08/2022 (on DAPT), left breast cancer s/p mastectomy, chronic respiratory failure (on home 2L of O2), COPD, ESRD on HD TTS w/ anemia of chronic disease, Atrial Fib (failed DCCV in Nov 2021); on eliquis and amiodarone, CVA, PVD (s/p left foot amputation on Plavix), HTN, T2DM, HLD admitted to observation for shortness of breath 2/2 volume overload. Patient reports that she started feeling extremely short of breath with associated chest tightness x 2 days ago. Reports compliance with home oxygen. Her last HD session was on 2/26. She reports that she got tangle her bed sheets and fell out of bed. Endorses head trauma, but is unsure if she lost consciousness. She also complains of RUQ aching abdominal pain that does not radiate for the last 2 days. Denies fever/chills, HA, cough, n/v, diarrhea, constipation, changes in appetite.     In the ED, patient was tachypneic to max 26. O2 sats >94% on 3-4L NC. Remaining vitals stable. K 8.4 (hemolyzed). BNP 4691 (elevated from baseline). Troponin I 0.529. CXR with cardiomegaly and coarsened interstitial lung markings. CTH, CT C spine, CT abd/pelvis pending. Given duoneb x 3, calcium gluconate x1, insulin 5 units x1, sodium bicard 50mEq x1, dextrose 10% 250 mL x1. Nephrology consulted, patient dialyzed this morning.       Overview/Hospital Course:  Pt placed in observation for hyperkalemia. K elevated to 8.4 on admission. Hypervolemia noted on CXR w/ coarsened interstitial lung marking. Pt was shifted in the  ED and nephrology was consulted for urgent HD needs. 3L UF w/ improvement of hypervolemia. CT head showed no acute intracranial hemorrhage or recent major vascular territory infarct. CT C spine showed no acute fracture or traumatic malalignment of the c spine. CT abd/pelvis showed no detrimental change or acute process seen within the abdomen or pelvis.   Nephrology continued following for HD needs. PTOT consulted for concern of need for post hospital placement.       Interval History: NAEON. Pt seen and evaluated at bedside this am. Pt to be seen by PTOT today, recs appreciated. Anticipating discharge tomorrow, likely home w/ HH. Nephrology continue to follow for HD needs. K 5.2, monitoring.    Review of Systems   Constitutional:  Positive for fatigue. Negative for activity change, chills, diaphoresis and fever.   HENT:  Negative for congestion, facial swelling, rhinorrhea and sore throat.    Eyes:  Negative for photophobia, itching and visual disturbance.   Respiratory:  Negative for cough, chest tightness, shortness of breath and wheezing.    Cardiovascular:  Negative for chest pain, palpitations and leg swelling.   Gastrointestinal:  Negative for abdominal distention, abdominal pain, blood in stool, constipation, diarrhea, nausea and vomiting.   Genitourinary:  Positive for difficulty urinating (HD). Negative for dysuria, frequency, hematuria and urgency.   Musculoskeletal:  Negative for arthralgias, back pain and neck stiffness.   Neurological:  Negative for dizziness, tremors, seizures, syncope, weakness, light-headedness, numbness and headaches.   Psychiatric/Behavioral:  Negative for agitation, confusion and hallucinations.    Objective:     Vital Signs (Most Recent):  Temp: 99 °F (37.2 °C) (03/06/22 1156)  Pulse: 66 (03/06/22 1156)  Resp: 20 (03/06/22 1156)  BP: 139/82 (03/06/22 1156)  SpO2: (!) 92 % (03/06/22 1156)   Vital Signs (24h Range):  Temp:  [98.2 °F (36.8 °C)-99 °F (37.2 °C)] 99 °F (37.2  °C)  Pulse:  [53-69] 66  Resp:  [18-20] 20  SpO2:  [92 %-99 %] 92 %  BP: (104-140)/(55-82) 139/82     Weight: 107 kg (235 lb 14.3 oz)  Body mass index is 40.49 kg/m².    Intake/Output Summary (Last 24 hours) at 3/6/2022 1321  Last data filed at 3/6/2022 0800  Gross per 24 hour   Intake 770 ml   Output 1790 ml   Net -1020 ml      Physical Exam  Vitals and nursing note reviewed.   Constitutional:       General: She is not in acute distress.     Appearance: She is well-developed. She is obese. She is not diaphoretic.   HENT:      Head: Normocephalic and atraumatic.      Right Ear: External ear normal.      Left Ear: External ear normal.      Nose: Nose normal. No congestion.      Mouth/Throat:      Pharynx: Oropharynx is clear.   Eyes:      General: No scleral icterus.     Extraocular Movements: Extraocular movements intact.   Cardiovascular:      Rate and Rhythm: Normal rate and regular rhythm.      Pulses: Normal pulses.      Heart sounds: Normal heart sounds. No murmur heard.  Pulmonary:      Effort: Pulmonary effort is normal. No respiratory distress.      Breath sounds: Normal breath sounds. No wheezing or rales.   Abdominal:      General: Bowel sounds are normal. There is no distension.      Palpations: Abdomen is soft.      Tenderness: There is no abdominal tenderness. There is no guarding or rebound.   Musculoskeletal:      Cervical back: Normal range of motion.      Right lower leg: No edema.      Left lower leg: No edema.   Skin:     General: Skin is warm and dry.      Capillary Refill: Capillary refill takes less than 2 seconds.   Neurological:      General: No focal deficit present.      Mental Status: She is alert, oriented to person, place, and time and easily aroused. Mental status is at baseline.   Psychiatric:         Mood and Affect: Mood normal.         Behavior: Behavior normal. Behavior is cooperative.         Thought Content: Thought content normal.       Significant Labs: All pertinent labs  within the past 24 hours have been reviewed.    Significant Imaging: I have reviewed all pertinent imaging results/findings within the past 24 hours.      Assessment/Plan:      * End-stage renal disease on hemodialysis  -HD 3/4/22  -avoid nephrotoxins  -nephrology following for HD needs, recs appreciated  -3L UF  -EKG, Calcium gluconate, and shifting if K>6.0  -continue to monitor      Hyperkalemia  -on admission 8.4 in setting of ESRD  -shifted w/ albuterol x 3, calcium gluconate x1, insulin 5 units x1, improved to 5  -continue to monitor    Hypertension  -home metoprolol tartrate 25 mg bid held in setting of hypotension      Type II diabetes mellitus  -hold home regimen  -starting insulin at home dose as she is not hyperglycemic. detemir 4 units bid, aspart 3 units tidwm, low dose SSI  -q4h accuchecks  -diabetic diet      NSTEMI (non-ST elevated myocardial infarction)  -continue asa, plavix, metoprolol tartrate 25 mg bid      Atrial fibrillation  Patient with Paroxysmal (<7 days) atrial fibrillation which is controlled currently with Beta Blocker and Amiodarone. Patient is currently in sinus rhythm.[id number] Score: 4. Anticoagulation indicated. Anticoagulation done with eliquis 2.5 mg qd.        Anemia in ESRD (end-stage renal disease)  -hgb 12, stable  -transfuse hgb <7  -continue to monitor      Chronic respiratory failure with hypoxia  -prn O2 NC  -duonebs prn    Secondary hyperparathyroidism  Hyperphosphatemia  -continue sevelamer 800 tidwm    Hyperphosphatemia        COPD (chronic obstructive pulmonary disease)  -stable  -prn duonebs      Morbid obesity  Body mass index is 40.49 kg/m². Morbid obesity complicates all aspects of disease management from diagnostic modalities to treatment. Weight loss encouraged and health benefits explained to patient.         Hyperlipidemia  -continue atorvastatin 40 mg qd        VTE Risk Mitigation (From admission, onward)         Ordered     heparin (porcine) injection 1,000  Units  As needed (PRN)         03/04/22 1639     Reason for No Pharmacological VTE Prophylaxis  Once        Question:  Reasons:  Answer:  Physician Provided (leave comment)  Comment:  pending CTH    03/04/22 0642     IP VTE HIGH RISK PATIENT  Once         03/04/22 0642     Place sequential compression device  Until discontinued         03/04/22 0642                Discharge Planning   UMAIR: 3/7/2022     Code Status: Full Code   Is the patient medically ready for discharge?: No    Reason for patient still in hospital (select all that apply): Patient trending condition, Laboratory test, Treatment, Consult recommendations and PT / OT recommendations                     Joi Willard PA-C  Department of Hospital Medicine   [facility] - Telemetry Stepdown ([place])

## 2022-03-06 NOTE — PT/OT/SLP EVAL
"Physical Therapy Co-Evaluation and Co-Treatment    Patient Name:  Kylie King   MRN:  [id number]    Recommendations:     Discharge Recommendations:  home health PT   Discharge Equipment Recommendations: none   Barriers to discharge: Decreased caregiver support    Assessment:       Kylie King is a 66 y.o. female admitted with a medical diagnosis of End-stage renal disease on hemodialysis.  She presents with the following impairments/functional limitations:  weakness, impaired functional mobilty, decreased safety awareness, impaired endurance, gait instability, impaired self care skills, impaired balance.  Pt participated in bed mobility, transfers, ADLs, and ambulation to/from bathroom with RW. Pt able to complete all mobility with supervision. Of note, pt also denying any falls or concerns upon d/c, despite frequent re-admissions, report of falls, and limited insight. However, pt continues to be adamant about d/c home with continued HHPT. Pt continues to present below their independent prior functional baseline. Pt would benefit from continued, skilled PT while in house to address the above listed impairments, further progress mobility as able, return towards highest level of function.      Rehab Prognosis: Good; patient would benefit from acute skilled PT services 3 x/week to address these deficits and reach maximum level of function.  Patient is most appropriate to go to home health PT .  Recent Surgery: * No surgery found *      Plan:     During this hospitalization, patient to be seen 3 x/week to address the identified rehab impairments via gait training, therapeutic activities, therapeutic exercises, neuromuscular re-education and progress toward the following goals:    · Plan of Care Expires:  04/06/22    Subjective     Subjective:"I'm going home tomorrow and PT will be there Friday"  Pt goal: d/c home  Pain/Comfort:  · Pain Rating 1: 0/10    Patients cultural, spiritual, Sikhism conflicts given " the current situation: no    Living Environment: Pt lives alone in a H with 4 Reny  Prior level of function:  Independent with use of rollator, states she uses transport service to get to/from iHD, does not drive  Falls within the last 90 days: Pt denies, however per EMR fell out of bed prior to admission.  Equipment owned: rollator, cane, straight, oxygen  Support available upon discharge: pt endorses having support from friends    Objective:     Communicated with nursing prior to session.  Patient found supine with telemetry, oxygen  upon PT entry to room.    Co-treatment performed for this visit due to patient need for two skilled therapists to ensure patient and staff safety and to accommodate for patient activity tolerance/pain management     General Precautions: Standard, fall   Orthopedic Precautions:N/A   Braces: N/A     Exams:  · Cognition: pt appropriate and cooperative, however grossly limited insight into current situation regarding readmissions and d/c plannign  · RLE ROM: WFL  · RLE Strength: WFL  · LLE ROM: WFL  · LLE Strength: WFL  · Posture: increased trunk flexion, forward head  · Integumentary: grossly intact  · Sensation: N/A    Functional Mobility:  · Bed Mobility: supervision for bed mobility  · Transfers: supervision for sit<>stand from EOB and toilet  · Gait: Pt ambulated ~15 ftx2 to/from bathroom with RW and supervision. Demo's antalagic gait however also limited by left transmet amp.  · Balance:   · Sitting: intact  · Standing: supervision with use of RW, no overt LOB noted    Therapeutic activities and education:  Education provided to pt re: d/c planning, PT POC, safety in mobility, concerns for d/c home and frequent readmissions, strategies to reduce fall risk. Pt endorses understanding.     Pt required total assist for hygiene tasks in standing.    AM-PAC 6 CLICK MOBILITY  Total Score:23     Patient left sitting EOB with all lines intact, call button in reach and RN present.    GOALS:    Multidisciplinary Problems     Physical Therapy Goals     Not on file                History:     Past Medical History:   Diagnosis Date    Anxiety     Breast cancer     left    Carotid artery disease     Cataract     Choledocholithiasis     s/p ERCP and stent placement    Chronic diastolic CHF (congestive heart failure)     Chronic obstructive pulmonary disease     Chronic venous insufficiency     Depression     End-stage renal disease on hemodialysis     M/W/F    GERD (gastroesophageal reflux disease)     History of breast cancer     History of colon cancer 2001    s/p sigmoid colectomy    History of kidney stones     History of osteomyelitis of left foot     History of pancreatitis     History of stroke     Hyperlipidemia     Hypertension     Intracerebral hemorrhage     Lumbar degenerative disc disease     Lumbar spinal stenosis     Morbid obesity     Paroxysmal atrial fibrillation     Peripheral artery disease     Peripheral neuropathy     Tobacco dependence     Type II diabetes mellitus     Urinary incontinence        Past Surgical History:   Procedure Laterality Date    ANGIOGRAM, CORONARY, WITH LEFT HEART CATHETERIZATION N/A 2/7/2022    Procedure: Angiogram, Coronary, with Left Heart Cath;  Surgeon: Adam Rutherford MD;  Location: [facility] CATH LAB;  Service: Cardiology;  Laterality: N/A;    ANGIOGRAPHY OF LOWER EXTREMITY Right 9/17/2020    Procedure: Angiogram Extremity Unilateral;  Surgeon: RICK Pollard III, MD;  Location: [facility] OR [address] [place] [address];  Service: Peripheral Vascular;  Laterality: Right;  6.2 minutes of fluro  690.88 mGy  112.64 Gycm2  55 ml    ANGIOGRAPHY OF LOWER EXTREMITY Left 5/13/2021    Procedure: Angiogram Extremity Unilateral;  Surgeon: HOMERO Hayden II, MD;  Location: [facility] OR [address] [place] [address];  Service: Vascular;  Laterality: Left;  35.1 min  971.41 mGy  190.27 Gy.cm  101ml Dye    ANGIOGRAPHY OF LOWER EXTREMITY Right 7/19/2021    Procedure: Angiogram Extremity  Unilateral;  Surgeon: HOMERO Hayden II, MD;  Location: [address];  Service: Vascular;  Laterality: Right;  3.0 min  121.00 mGy  26.8713 Gy.cm  13ml Dye    AORTOGRAPHY N/A 5/13/2021    Procedure: AORTOGRAM;  Surgeon: HOMERO Hayden II, MD;  [address];  Service: Vascular;  Laterality: N/A;    AV FISTULA PLACEMENT Right 5/28/2018    Procedure: CREATION -FISTULA-AV;  Surgeon: RICK Pollard III, MD;  Location: [address];  Service: Peripheral Vascular;  Laterality: Right;    BREAST BIOPSY  1/2012    left breast invasive mammary carcinoma (lateral bx) and intraductal papilloma (medial bx)    CARDIOVERSION  11/22/2021    CARDIOVERSION  11/22/2021    Procedure: Cardioversion;  Surgeon: Ad Garcia MD;  Location: [facility] CATH LAB;  Service: Cardiology;;    CATARACT EXTRACTION W/  INTRAOCULAR LENS IMPLANT Right 1/24/2019        CATARACT EXTRACTION W/  INTRAOCULAR LENS IMPLANT Left 1/31/2019    Procedure: EXTRACTION, CATARACT, WITH IOL INSERTION;  Surgeon: Immanuel Moncada MD;  Location: [facility];  Service: Ophthalmology;  Laterality: Left;    CHOLECYSTECTOMY  2001    CORONARY ANGIOGRAPHY N/A 2/8/2022    Procedure: ANGIOGRAM, CORONARY ARTERY;  Surgeon: Abelardo Betancur MD;  Location: [facility] CATH LAB;  Service: Cardiology;  Laterality: N/A;    DEBRIDEMENT OF FOOT Right 2/17/2021    Procedure: DEBRIDEMENT, FOOT right plantar ulcer;  Surgeon: Sonja Corral DPM;  Location: [facility];  Service: Podiatry;  Laterality: Right;    ERCP W/ SPHICTEROTOMY      FINE NEEDLE ASPIRATION  1999    Right breast    FOOT AMPUTATION Left 6/1/2021    Procedure: Transmetatarsal amputation;  Surgeon: Billy Robles DPM;  Location: [address];  Service: Podiatry;  Laterality: Left;    FOOT AMPUTATION Left 7/23/2021    Procedure: AMPUTATION, FOOT;  Surgeon: Billy Robles DPM;  Location: [address];  Service: Podiatry;  Laterality: Left;    FOOT AMPUTATION THROUGH METATARSAL Right  10/15/2020    Procedure: PARTIAL RAY AMPUTATION GREAT TOE;  Surgeon: Billy Robles DPM;  Location: [facility] OR [address] [place] [address];  Service: Podiatry;  Laterality: Right;  Stretcher [place]    HERNIA REPAIR      LAPAROSCOPIC NEPHRECTOMY Left 2011    MASTECTOMY  2013    left    OOPHORECTOMY Left 2001    REVISION OF ARTERIOVENOUS FISTULA Right 8/15/2018    Procedure: REVISION, AV FISTULA;  Surgeon: RICK Pollard III, MD;  Location: [facility] OR [address] [place] [address];  Service: Peripheral Vascular;  Laterality: Right;    SIGMOIDECTOMY  2001    SURGICAL REMOVAL OF METATARSAL HEAD Right 2/17/2021    Procedure: OSTECTOMY, METATARSAL BONE, diatal 1st;  Surgeon: Sonja Corral DPM;  Location: [facility];  Service: Podiatry;  Laterality: Right;    TOE AMPUTATION Left 5/13/2021    Procedure: AMPUTATION, TOE;  Surgeon: HOMERO Hayden II, MD;  Location: [facility] OR [address] [place] [address];  Service: Vascular;  Laterality: Left;    TOTAL REDUCTION MAMMOPLASTY Right 2013       Time Tracking:     PT Received On: 03/06/22  PT Start Time: 1307     PT Stop Time: 1325  PT Total Time (min): 18 min     Billable Minutes: Evaluation 10 min and Therapeutic Activity 8 min      Meri Ellis, PT, DPT, GCS  03/06/2022

## 2022-03-06 NOTE — PLAN OF CARE
Pt aaox4, reports diarrhea w/ associated discomfort. Reviewed POC and fall precautions w/ pt, verbalized understanding. Needs addressed. No events overnight.    Problem: Device-Related Complication Risk (Hemodialysis)  Goal: Safe, Effective Therapy Delivery  Outcome: Ongoing, Progressing     Problem: Adult Inpatient Plan of Care  Goal: Optimal Comfort and Wellbeing  Outcome: Ongoing, Progressing

## 2022-03-06 NOTE — SUBJECTIVE & OBJECTIVE
Interval History: NAEON. Pt seen and evaluated at bedside this am. Pt to be seen by PTOT today, recs appreciated. Anticipating discharge tomorrow, likely home w/ HH. Nephrology continue to follow for HD needs. K 5.2, monitoring.    Review of Systems   Constitutional:  Positive for fatigue. Negative for activity change, chills, diaphoresis and fever.   HENT:  Negative for congestion, facial swelling, rhinorrhea and sore throat.    Eyes:  Negative for photophobia, itching and visual disturbance.   Respiratory:  Negative for cough, chest tightness, shortness of breath and wheezing.    Cardiovascular:  Negative for chest pain, palpitations and leg swelling.   Gastrointestinal:  Negative for abdominal distention, abdominal pain, blood in stool, constipation, diarrhea, nausea and vomiting.   Genitourinary:  Positive for difficulty urinating (HD). Negative for dysuria, frequency, hematuria and urgency.   Musculoskeletal:  Negative for arthralgias, back pain and neck stiffness.   Neurological:  Negative for dizziness, tremors, seizures, syncope, weakness, light-headedness, numbness and headaches.   Psychiatric/Behavioral:  Negative for agitation, confusion and hallucinations.    Objective:     Vital Signs (Most Recent):  Temp: 99 °F (37.2 °C) (03/06/22 1156)  Pulse: 66 (03/06/22 1156)  Resp: 20 (03/06/22 1156)  BP: 139/82 (03/06/22 1156)  SpO2: (!) 92 % (03/06/22 1156)   Vital Signs (24h Range):  Temp:  [98.2 °F (36.8 °C)-99 °F (37.2 °C)] 99 °F (37.2 °C)  Pulse:  [53-69] 66  Resp:  [18-20] 20  SpO2:  [92 %-99 %] 92 %  BP: (104-140)/(55-82) 139/82     Weight: 107 kg (235 lb 14.3 oz)  Body mass index is 40.49 kg/m².    Intake/Output Summary (Last 24 hours) at 3/6/2022 1321  Last data filed at 3/6/2022 0800  Gross per 24 hour   Intake 770 ml   Output 1790 ml   Net -1020 ml      Physical Exam  Vitals and nursing note reviewed.   Constitutional:       General: She is not in acute distress.     Appearance: She is well-developed.  She is obese. She is not diaphoretic.   HENT:      Head: Normocephalic and atraumatic.      Right Ear: External ear normal.      Left Ear: External ear normal.      Nose: Nose normal. No congestion.      Mouth/Throat:      Pharynx: Oropharynx is clear.   Eyes:      General: No scleral icterus.     Extraocular Movements: Extraocular movements intact.   Cardiovascular:      Rate and Rhythm: Normal rate and regular rhythm.      Pulses: Normal pulses.      Heart sounds: Normal heart sounds. No murmur heard.  Pulmonary:      Effort: Pulmonary effort is normal. No respiratory distress.      Breath sounds: Normal breath sounds. No wheezing or rales.   Abdominal:      General: Bowel sounds are normal. There is no distension.      Palpations: Abdomen is soft.      Tenderness: There is no abdominal tenderness. There is no guarding or rebound.   Musculoskeletal:      Cervical back: Normal range of motion.      Right lower leg: No edema.      Left lower leg: No edema.   Skin:     General: Skin is warm and dry.      Capillary Refill: Capillary refill takes less than 2 seconds.   Neurological:      General: No focal deficit present.      Mental Status: She is alert, oriented to person, place, and time and easily aroused. Mental status is at baseline.   Psychiatric:         Mood and Affect: Mood normal.         Behavior: Behavior normal. Behavior is cooperative.         Thought Content: Thought content normal.       Significant Labs: All pertinent labs within the past 24 hours have been reviewed.    Significant Imaging: I have reviewed all pertinent imaging results/findings within the past 24 hours.

## 2022-03-06 NOTE — PLAN OF CARE
Problem: Occupational Therapy Goal  Goal: Occupational Therapy Goal  Description: Goals to be met by: 3/28/2022    Patient will increase functional independence with ADLs by performing:    UE Dressing with Set-up Assistance.  Grooming while standing at sink with Modified [place].  Toileting from toilet with Modified [place] for hygiene and clothing management.   Supine to sit with Modified [place].  Stand pivot transfers with Modified [place].  Toilet transfer to toilet with Modified [place].    Outcome: Ongoing, Progressing   Patient's goals are set.

## 2022-03-06 NOTE — PT/OT/SLP EVAL
Occupational Therapy   Co-Evaluation/Treatment    Name: Kylie King  MRN: [id number]  Admitting Diagnosis:  End-stage renal disease on hemodialysis  Recent Surgery: * No surgery found *      Recommendations:     Discharge Recommendations: home health OT  Discharge Equipment Recommendations:  none  Barriers to discharge:  Decreased caregiver support    Assessment:     Kylie King is a 66 y.o. female with a medical diagnosis of End-stage renal disease on hemodialysis.  Performance deficits affecting function: weakness, impaired endurance, impaired self care skills, impaired functional mobilty, gait instability, impaired balance. Patient would benefit from continued skilled acute OT 3x/wk to improve functional mobility, increase independence with ADLs, and address established goals. Recommending     once medically appropriate for discharge to increase maximal independence, reduce burden of care, and ensure safety.     Rehab Prognosis: Good; patient would benefit from acute skilled OT services to address these deficits and reach maximum level of function.       Plan:     Patient to be seen 3 x/week to address the above listed problems via self-care/home management, therapeutic activities, therapeutic exercises  · Plan of Care Expires: 04/06/22  · Plan of Care Reviewed with: patient    Subjective     Chief Complaint: Patient reports she is going home tomorrow and having HHPT on Friday.   Patient/Family Comments/goals: to go home    Occupational Profile:  Living Environment: Pt lives alone in a [facility] with 4 MARIA LUISA. Patient has a walk in shower with a shower stool and a grab bar. Patient has a regular toilet with no grab bar. PTA, patient was independent with use of rollator, states she uses transport service to get to/from HD, does not drive  Pt denies, however per EMR fell out of bed prior to admission. Patient owns a rollator, cane, straight, oxygen  Patient reports having support from  friends.     Pain/Comfort:  · Pain Rating 1: 0/10  · Pain Rating Post-Intervention 1: 0/10    Patients cultural, spiritual, Temple conflicts given the current situation: no    Objective:     Communicated with: GILDA prior to session.  Patient found sitting EOB with telemetry, oxygen upon OT entry to room.    General Precautions: Standard, fall   Orthopedic Precautions:N/A   Braces: N/A    Occupational Performance:    Functional Mobility/Transfers:  · Patient completed Sit <> Stand Transfer with supervision  with  rolling walker   · Patient completed Toilet Transfer functional ambulation technique with supervision with  rolling walker    Activities of Daily Living:  · Feeding:  independence  EOB  · Grooming: supervision standing at sink  · Lower Body Dressing: supervision Fisher and donning R sock EOB  · Toileting: maximal assistance Due to being soiled from having a BM and needing to be thoroughly cleaned and brief needing to be changed    Cognitive/Visual Perceptual:  Cognitive/Psychosocial Skills:     -       Oriented to: Person, Place, Time and Situation   -       Follows Commands/attention:Follows multistep  commands  -       Communication: clear/fluent  -       Memory: No Deficits noted  · -       Safety awareness/insight to disability: intact, but grossly limited insight into current situation regarding readmissions and d/c planning   -       Mood/Affect/Coping skills/emotional control: Appropriate to situation  Visual/Perceptual:      -Intact      Physical Exam:  Upper Extremity Range of Motion:     -       Right Upper Extremity: WFL  -       Left Upper Extremity: WFL  Upper Extremity Strength:    -       Right Upper Extremity: 4/5  -       Left Upper Extremity: 4/5   Strength:    -       Right Upper Extremity: 4/5  -       Left Upper Extremity: WFL  Fine Motor Coordination:    -       Intact  Gross motor coordination:   WFL    AMPAC 6 Click ADL:  AMPAC Total Score: 19    Treatment & Education:  Role  of OT and POC  ADL retraining  Functional mobility training  Safety  Discharge planning    Co-treatment performed due to patient's multiple deficits requiring two skilled therapists to appropriately and safely assess patient's strength and endurance while facilitating functional tasks in addition to accommodating for patient's activity tolerance.   Education:    Patient left sitting edge of bed with all lines intact, call button in reach, nurse present, and all needs met    GOALS:   Multidisciplinary Problems     Occupational Therapy Goals        Problem: Occupational Therapy Goal    Goal Priority Disciplines Outcome Interventions   Occupational Therapy Goal     OT, PT/OT Ongoing, Progressing    Description: Goals to be met by: 3/28/2022    Patient will increase functional independence with ADLs by performing:    UE Dressing with Set-up Assistance.  Grooming while standing at sink with Modified Swink.  Toileting from toilet with Modified Swink for hygiene and clothing management.   Supine to sit with Modified Swink.  Stand pivot transfers with Modified Swink.  Toilet transfer to toilet with Modified Swink.                     History:     Past Medical History:   Diagnosis Date    Anxiety     Breast cancer     left    Carotid artery disease     Cataract     Choledocholithiasis     s/p ERCP and stent placement    Chronic diastolic CHF (congestive heart failure)     Chronic obstructive pulmonary disease     Chronic venous insufficiency     Depression     End-stage renal disease on hemodialysis     M/W/F    GERD (gastroesophageal reflux disease)     History of breast cancer     History of colon cancer 2001    s/p sigmoid colectomy    History of kidney stones     History of osteomyelitis of left foot     History of pancreatitis     History of stroke     Hyperlipidemia     Hypertension     Intracerebral hemorrhage     Lumbar degenerative disc disease     Lumbar  spinal stenosis     Morbid obesity     Paroxysmal atrial fibrillation     Peripheral artery disease     Peripheral neuropathy     Tobacco dependence     Type II diabetes mellitus     Urinary incontinence        Past Surgical History:   Procedure Laterality Date    ANGIOGRAM, CORONARY, WITH LEFT HEART CATHETERIZATION N/A 2/7/2022    Procedure: Angiogram, Coronary, with Left Heart Cath;  Surgeon: Adam Rutherford MD;  Location: [facility] CATH LAB;  Service: Cardiology;  Laterality: N/A;    ANGIOGRAPHY OF LOWER EXTREMITY Right 9/17/2020    Procedure: Angiogram Extremity Unilateral;  Surgeon: RICK Pollard III, MD;  Location: [facility] OR [address] [place] [address];  Service: Peripheral Vascular;  Laterality: Right;  6.2 minutes of fluro  690.88 mGy  112.64 Gycm2  55 ml    ANGIOGRAPHY OF LOWER EXTREMITY Left 5/13/2021    Procedure: Angiogram Extremity Unilateral;  Surgeon: HOMERO Hayden II, MD;  Location: [address];  Service: Vascular;  Laterality: Left;  35.1 min  971.41 mGy  190.27 Gy.cm  101ml Dye    ANGIOGRAPHY OF LOWER EXTREMITY Right 7/19/2021    Procedure: Angiogram Extremity Unilateral;  Surgeon: HOMERO Hayden II, MD;  Location: [address];  Service: Vascular;  Laterality: Right;  3.0 min  121.00 mGy  26.8713 Gy.cm  13ml Dye    AORTOGRAPHY N/A 5/13/2021    Procedure: AORTOGRAM;  Surgeon: HOMERO Hayden II, MD;  Location: [address];  Service: Vascular;  Laterality: N/A;    AV FISTULA PLACEMENT Right 5/28/2018    Procedure: CREATION -FISTULA-AV;  Surgeon: RICK Pollard III, MD;  Location: [address];  Service: Peripheral Vascular;  Laterality: Right;    BREAST BIOPSY  1/2012    left breast invasive mammary carcinoma (lateral bx) and intraductal papilloma (medial bx)    CARDIOVERSION  11/22/2021    CARDIOVERSION  11/22/2021    Procedure: Cardioversion;  Surgeon: Ad Garcia MD;  Location[facility] LAB;  Service: Cardiology;;    CATARACT EXTRACTION W/  INTRAOCULAR LENS IMPLANT Right  1/24/2019        CATARACT EXTRACTION W/  INTRAOCULAR LENS IMPLANT Left 1/31/2019    Procedure: EXTRACTION, CATARACT, WITH IOL INSERTION;  Surgeon: Immanuel Moncada MD;  Location: [facility] OR;  Service: Ophthalmology;  Laterality: Left;    CHOLECYSTECTOMY  2001    CORONARY ANGIOGRAPHY N/A 2/8/2022    Procedure: ANGIOGRAM, CORONARY ARTERY;  Surgeon: Abelardo Betancur MD;  Location: [facility] CATH LAB;  Service: Cardiology;  Laterality: N/A;    DEBRIDEMENT OF FOOT Right 2/17/2021    Procedure: DEBRIDEMENT, FOOT right plantar ulcer;  Surgeon: Sonja Corral DPM;  [facility];  Service: Podiatry;  Laterality: Right;    ERCP W/ SPHICTEROTOMY      FINE NEEDLE ASPIRATION  1999    Right breast    FOOT AMPUTATION Left 6/1/2021    Procedure: Transmetatarsal amputation;  Surgeon: Billy Robles DPM;  [address]  Service: Podiatry;  Laterality: Left;    FOOT AMPUTATION Left 7/23/2021    Procedure: AMPUTATION, FOOT;  Surgeon: Billy Robles DPM;  [address];  Service: Podiatry;  Laterality: Left;    FOOT AMPUTATION THROUGH METATARSAL Right 10/15/2020    Procedure: PARTIAL RAY AMPUTATION GREAT TOE;  Surgeon: Billy Robles DPM;  [facility];  Service: Podiatry;  Laterality: Right;  Stretcher OK    HERNIA REPAIR      LAPAROSCOPIC NEPHRECTOMY Left 2011    MASTECTOMY  2013    left    OOPHORECTOMY Left 2001    REVISION OF ARTERIOVENOUS FISTULA Right 8/15/2018    Procedure: REVISION, AV FISTULA;  Surgeon: RICK Pollard III, MD;  [address];  Service: Peripheral Vascular;  Laterality: Right;    SIGMOIDECTOMY  2001    SURGICAL REMOVAL OF METATARSAL HEAD Right 2/17/2021    Procedure: OSTECTOMY, METATARSAL BONE, diatal 1st;  Surgeon: Sonja Corral DPM;  [facility];  Service: Podiatry;  Laterality: Right;    TOE AMPUTATION Left 5/13/2021    Procedure: AMPUTATION, TOE;  Surgeon: HOMERO Hayden II, MD;  [facility];  Service: Vascular;   Laterality: Left;    TOTAL REDUCTION MAMMOPLASTY Right 2013       Time Tracking:     OT Date of Treatment: 03/06/22  OT Start Time: 1307  OT Stop Time: 1327  OT Total Time (min): 20 min    Billable Minutes:Evaluation 12  Self Care/Home Management 8    3/6/2022

## 2022-03-06 NOTE — PLAN OF CARE
Problem: Physical Therapy Goal  Goal: Physical Therapy Goal  Description: Goals to be met by: 3/13/22    Patient will increase functional independence with mobility by performin. Pt will perform supine<>sit with independence to improve independence with mobility  2. Pt will perform functional transfers with independence   3. Pt will ambulate >150 ft feet with LRAD and modified independence to safely perform household distance ambulation   Outcome: Ongoing, Progressing     Evaluation completed and goals currently appropriate at this time.    Meri Ellis, PT, DPT, GCS  3/6/2022

## 2022-03-06 NOTE — ASSESSMENT & PLAN NOTE
-HD 3/4/22  -avoid nephrotoxins  -nephrology following for HD needs, recs appreciated  -3L UF  -EKG, Calcium gluconate, and shifting if K>6.0  -continue to monitor

## 2022-03-06 NOTE — ASSESSMENT & PLAN NOTE
Patient with Paroxysmal (<7 days) atrial fibrillation which is controlled currently with Beta Blocker and Amiodarone. Patient is currently in sinus rhythm.[id number] Score: 4. Anticoagulation indicated. Anticoagulation done with eliquis 2.5 mg qd.

## 2022-03-07 NOTE — PLAN OF CARE
[facility] - Telemetry Stepdown ([place]-7)  Initial Discharge Assessment       Primary Care Provider: Virginia Foote MD    Admission Diagnosis: Shortness of breath [R06.02]  Hyperkalemia [E87.5]  Chest pain [R07.9]    Admission Date: 3/3/2022  Expected Discharge Date: 3/7/2022    Discharge Barriers Identified: None    Payor: [employer] MANAGED MEDICARE / Plan: [employer] MEDICARE PLAN PPO / Product Type: Medicare Advantage /     Extended Emergency Contact Information  Primary Emergency Contact: Charan King  Address: [address] [place] [address] [place]  Mobile Phone: [phone]  Relation: Son  Secondary Emergency Contact: Henry King   [place]  Mobile Phone: [phone]  Relation: Spouse    Discharge Plan A: Home Health  Discharge Plan B: Home Health      [facility] - [place] - [address]  [address] [place]) [place] [address]  Phone: [phone] Fax: [phone]    [facility] - [address] [address]  Phone: [phone] Fax: [phone]      Initial Assessment (most recent)     Adult Discharge Assessment - 03/07/22 0954        Discharge Assessment    Assessment Type Discharge Planning Assessment     Confirmed/corrected address, phone number and insurance Yes     Confirmed Demographics Correct on Facesheet     Source of Information patient     Does patient/caregiver understand observation status Yes     Communicated UMAIR with patient/caregiver Yes     Reason For Admission End-stage renal disease on hemodialysis     Lives With alone     Facility Arrived From: Home     Do you expect to return to your current living situation? Yes     Do you have help at home or someone to help you manage your care at home? Yes     Who are your caregiver(s) and their phone number(s)? Patient has day and night sitters     Prior to hospitilization cognitive status:  Alert/Oriented     Current cognitive status: Alert/Oriented     Walking or Climbing Stairs Difficulty ambulation difficulty, requires equipment     Mobility Management Rollator     Dressing/Bathing Difficulty none     Home Accessibility stairs to enter home     Number of Stairs, Main Entrance five     Equipment Currently Used at Home rollator     Readmission within 30 days? Yes     Patient currently being followed by outpatient case management? No     Do you currently have service(s) that help you manage your care at home? Yes     Name and Contact number of agency [facility] Will resume     Is the pt/caregiver preference to resume services with current agency Yes     Do you take prescription medications? Yes     Do you have prescription coverage? Yes     Coverage [employer] Medicare     Do you have any problems affording any of your prescribed medications? No     Is the patient taking medications as prescribed? yes     Who is going to help you get home at discharge? Patient will need a Lyft ride     How do you get to doctors appointments? family or friend will provide;public transportation     Are you on dialysis? Yes     Dialysis Name and Scheduled days [facility]     Do you take coumadin? No     Discharge Plan A Home Health     Discharge Plan B Home Health     DME Needed Upon Discharge  other (see comments)   TBD    Discharge Plan discussed with: Patient     Discharge Barriers Identified None               Met with patient at bedside. She lives alone and has day time and night time sitters. Patient current with [facility]. They were contacted and will resume services with patient. Patient will need Lyft transportation upon discharge. Secure messaged patient's nurse to ask that she advise when she is about to discharge patient and bring her down to [place] entrance.      Erma Hong, [facility]  [phone]

## 2022-03-07 NOTE — PLAN OF CARE
No acute events during the night. AOx4 resp even, unlabored VSS on RA,monitor in place. Patient administered medications as prescribed by the provider. Denies any pain/discomfort. Safety precautions maintained,call light in reach,bed in the lowest position, side rails up x 2 NAD noted at this time will cont to monitor.

## 2022-03-07 NOTE — PLAN OF CARE
Scheduled Lyft ride for patient to return home.     Erma Hong, [facility]  [phone]

## 2022-03-07 NOTE — PLAN OF CARE
[facility] - Telemetry Stepdown ([place] [address])      HOME HEALTH ORDERS  FACE TO FACE ENCOUNTER    Patient Name: Kylie King  YOB: 1955    PCP: Virginia Foote MD   PCP Address: [address] [facility] / [place] [address]  PCP Phone Number: [phone]  PCP Fax: [phone]    Encounter Date: 3/3/22    Admit to Home Health    Diagnoses:  Active Hospital Problems    Diagnosis  POA    *End-stage renal disease on hemodialysis [N18.6, Z99.2]  Not Applicable     Priority: 2      Chronic    Hyperkalemia [E87.5]  Yes     Priority: 2     Hypertension [I10]  Yes     Priority: 3      Chronic    Type II diabetes mellitus [E11.9]  Yes     Priority: 4      Chronic    NSTEMI (non-ST elevated myocardial infarction) [I21.4]  Yes     Chronic    Atrial fibrillation [I48.91]  Yes     Chronic    Anemia in ESRD (end-stage renal disease) [N18.6, D63.1]  Yes    Chronic respiratory failure with hypoxia [J96.11]  Yes    Secondary hyperparathyroidism [N25.81]  Yes     Chronic    Hyperphosphatemia [E83.39]  Yes    COPD (chronic obstructive pulmonary disease) [J44.9]  Yes     Chronic    Morbid obesity [E66.01]  Yes     Chronic    Hyperlipidemia [E78.5]  Yes      Resolved Hospital Problems   No resolved problems to display.       Follow Up Appointments:  Future Appointments   Date Time Provider Department Center   3/14/2022 11:30 AM Virginia Foote MD [facility] IM [place]   3/15/2022  2:00 PM Virginia Foote MD [facility] [place]   3/30/2022  1:40 PM Teresita Barney MD [facility] CARDIO [facility]   5/31/2022  1:00 PM EKG, APPT [facility]   5/31/2022  1:20 PM Joe Kuo MD [facility]TH [facility]       Allergies:  Review of patient's allergies indicates:   Allergen Reactions    Cyclobenzaprine Hallucinations    Erythromycin      Stomach upset    Keflex [cephalexin]      Yeast infection    Erythromycin base      Other reaction(s): upset stomach       Medications: Review discharge medications  with patient and family and provide education.    Current Facility-Administered Medications   Medication Dose Route Frequency Provider Last Rate Last Admin    0.9%  NaCl infusion   Intravenous Once Jeri Vasquez MD        acetaminophen tablet 650 mg  650 mg Oral Q4H PRN Nuha Nash PA-C        albuterol-ipratropium 2.5 mg-0.5 mg/3 mL nebulizer solution 3 mL  3 mL Nebulization Q4H PRN Nuha Nash PA-C        amiodarone tablet 200 mg  200 mg Oral Daily Joi Willard PA-C   200 mg at 03/06/22 1024    atorvastatin tablet 40 mg  40 mg Oral Daily Joi Willard PA-C   40 mg at 03/06/22 1024    bisacodyL suppository 10 mg  10 mg Rectal Daily PRN Nuha Nash PA-C        dextrose 10% bolus 125 mL  12.5 g Intravenous PRN uOsmane Vera MD        dextrose 10% bolus 250 mL  25 g Intravenous PRN Ousmane Vera MD        glucagon (human recombinant) injection 1 mg  1 mg Intramuscular PRN Nuha Nash PA-C        glucose chewable tablet 16 g  16 g Oral PRN Nuha Nash PA-C        glucose chewable tablet 24 g  24 g Oral PRN Nuha Nash PA-C        heparin (porcine) injection 1,000 Units  1,000 Units Intra-Catheter PRN Jeri Vasquez MD        insulin aspart U-100 pen 0-5 Units  0-5 Units Subcutaneous QID (AC + HS) PRN Joi Willard PA-C        insulin aspart U-100 pen 3 Units  3 Units Subcutaneous TIDWM Joi Willard PA-C   3 Units at 03/06/22 1034    insulin detemir U-100 pen 4 Units  4 Units Subcutaneous BID Joi Willard PA-C   4 Units at 03/06/22 2153    Lactobacillus rhamnosus GG capsule 1 capsule  1 capsule Oral BID Danette Meyers NP   1 capsule at 03/06/22 2151    melatonin tablet 6 mg  6 mg Oral Nightly PRN Nuha Nash PA-C        methocarbamoL tablet 500 mg  500 mg Oral TID Joi Willard PA-C   500 mg at 03/06/22 2152    metoprolol tartrate (LOPRESSOR) tablet 25 mg  25 mg Oral BID Joi Willard PA-C   25 mg at  03/06/22 2152    naloxone 0.4 mg/mL injection 0.02 mg  0.02 mg Intravenous PRN Nuha Nash PA-C        omega-3 acid ethyl esters capsule 1 g  1 g Oral QAM Ousmane Vera MD   1 g at 03/07/22 0606    ondansetron disintegrating tablet 8 mg  8 mg Oral Q8H PRN Nuha Nash PA-C        polyethylene glycol packet 17 g  17 g Oral Daily PRN Nuha Nash PA-C        promethazine tablet 25 mg  25 mg Oral Q6H PRN Nuha Nash PA-C        sevelamer carbonate tablet 1,600 mg  1,600 mg Oral TID WM Joi Willard PA-C        sodium chloride 0.9% bolus 250 mL  250 mL Intravenous PRN Jeri Vasquez MD        sodium chloride 0.9% flush 10 mL  10 mL Intravenous Q8H PRN Nuha Nash PA-C         Current Discharge Medication List      CONTINUE these medications which have NOT CHANGED    Details   acetaminophen-codeine 300-30mg (TYLENOL #3) 300-30 mg Tab Take 1 tablet by mouth every 8 (eight) hours as needed.  Qty: 90 tablet, Refills: 1    Comments: Quantity prescribed more than 7 day supply? Yes, quantity medically necessary  Associated Diagnoses: Low back pain, unspecified back pain laterality, unspecified chronicity, unspecified whether sciatica present      albuterol (PROVENTIL/VENTOLIN HFA) 90 mcg/actuation inhaler Inhale 2 puffs into the lungs every 6 (six) hours as needed for Wheezing. Rescue  Qty: 18 g, Refills: 3      amiodarone (PACERONE) 200 MG Tab Take 1 tablet (200 mg total) by mouth once daily.  Qty: 90 tablet, Refills: 3      apixaban (ELIQUIS) 2.5 mg Tab Take 1 tablet (2.5 mg total) by mouth 2 (two) times daily.  Qty: 60 tablet, Refills: 1      aspirin (ECOTRIN) 81 MG EC tablet Take 1 tablet (81 mg total) by mouth once daily. Take until 3/8/22, then stop unless further instructed by provider.  Qty: 30 tablet, Refills: 0      atorvastatin (LIPITOR) 40 MG tablet Take 1 tablet (40 mg total) by mouth once daily.  Qty: 90 tablet, Refills: 3      blood sugar diagnostic Strp 1 strip by  Misc.(Non-Drug; Combo Route) route 4 (four) times daily.  Qty: 150 strip, Refills: 3    Comments: Test strips for patient's current glucometer.      blood-glucose sensor (DEXCOM G6 SENSOR) Umm 1 each by Misc.(Non-Drug; Combo Route) route every 10 days.  Qty: 3 each, Refills: 11      blood-glucose transmitter (DEXCOM G6 TRANSMITTER) Umm 1 Device by Misc.(Non-Drug; Combo Route) route continuous.  Qty: 1 Device, Refills: 3      clopidogreL (PLAVIX) 75 mg tablet Take 1 tablet (75 mg total) by mouth once daily.  Qty: 90 tablet, Refills: 3      fish oil-omega-3 fatty acids 300-1,000 mg capsule Take 1 capsule by mouth every morning.      !! flash glucose scanning reader (FREESTYLE LULU 14 DAY READER) Misc Use as directed to check blood sugars  Qty: 2 each, Refills: 11    Comments: 30 day supply  Associated Diagnoses: Type 2 diabetes mellitus with chronic kidney disease on chronic dialysis, with long-term current use of insulin      !! flash glucose scanning reader (FREESTYLE LULU 2 READER) Misc 1 Units by Misc.(Non-Drug; Combo Route) route continuous prn.  Qty: 1 each, Refills: 0    Associated Diagnoses: Type 2 diabetes mellitus with chronic kidney disease on chronic dialysis, with long-term current use of insulin      !! flash glucose sensor (FREESTYLE LULU 14 DAY SENSOR) Kit Use as directed to check blood sugars  Qty: 2 kit, Refills: 11    Comments: 30 day supply  Associated Diagnoses: Type 2 diabetes mellitus with chronic kidney disease on chronic dialysis, with long-term current use of insulin      !! flash glucose sensor (FREESTYLE LULU 2 SENSOR) Kit 1 Units by Misc.(Non-Drug; Combo Route) route every 14 (fourteen) days.  Qty: 2 kit, Refills: 11    Associated Diagnoses: Type 2 diabetes mellitus with chronic kidney disease on chronic dialysis, with long-term current use of insulin      insulin aspart U-100 (NOVOLOG) 100 unit/mL (3 mL) InPn pen Inject 3 Units into the skin 3 (three) times daily with meals.  Qty:  5.4 mL, Refills: 0      insulin detemir U-100 (LEVEMIR FLEXTOUCH) 100 unit/mL (3 mL) SubQ InPn pen Inject 4 Units into the skin 2 (two) times daily.  Qty: 4.8 mL, Refills: 0      lancets (ONETOUCH DELICA LANCETS) 33 gauge Misc 1 lancet by Misc.(Non-Drug; Combo Route) route 4 (four) times daily before meals and nightly.  Qty: 400 each, Refills: 4      methocarbamoL (ROBAXIN) 500 MG Tab Take 1 tablet (500 mg total) by mouth 3 (three) times daily.  Qty: 60 tablet, Refills: 3      metoprolol tartrate (LOPRESSOR) 25 MG tablet Take 1 tablet (25 mg total) by mouth 2 (two) times daily.  Qty: 60 tablet, Refills: 2    Comments: .      nitroGLYCERIN (NITROSTAT) 0.3 MG SL tablet Place 1 tablet (0.3 mg total) under the tongue every 5 (five) minutes as needed for Chest pain.  Qty: 15 tablet, Refills: 0      senna-docusate 8.6-50 mg (PERICOLACE) 8.6-50 mg per tablet Take 1 tablet by mouth 2 (two) times daily.      sevelamer carbonate (RENVELA) 800 mg Tab Take 2 tablets (1,600 mg total) by mouth 3 (three) times daily with meals.  Qty: 180 tablet, Refills: 11       !! - Potential duplicate medications found. Please discuss with provider.            I have seen and examined this patient within the last 30 days. My clinical findings that support the need for the home health skilled services and home bound status are the following:no   Weakness/numbness causing balance and gait disturbance due to Weakness/Debility making it taxing to leave home.     Diet:   diabetic diet 2000 calorie and renal diet    Labs:  Report Lab results to PCP.    Referrals/ Consults  Physical Therapy to evaluate and treat. Evaluate for home safety and equipment needs; Establish/upgrade home exercise program. Perform / instruct on therapeutic exercises, gait training, transfer training, and Range of Motion.  Occupational Therapy to evaluate and treat. Evaluate home environment for safety and equipment needs. Perform/Instruct on transfers, ADL training, ROM, and  therapeutic exercises.    Activities:   activity as tolerated    Nursing:   Agency to admit patient within 24 hours of hospital discharge unless specified on physician order or at patient request    SN to complete comprehensive assessment including routine vital signs. Instruct on disease process and s/s of complications to report to MD. Review/verify medication list sent home with the patient at time of discharge  and instruct patient/caregiver as needed. Frequency may be adjusted depending on start of care date.     Skilled nurse to perform up to 3 visits PRN for symptoms related to diagnosis    Notify MD if SBP > 160 or < 90; DBP > 90 or < 50; HR > 120 or < 50; Temp > 101; O2 < 88%    Ok to schedule additional visits based on staff availability and patient request on consecutive days within the home health episode.    When multiple disciplines ordered:    Start of Care occurs on Sunday - Wednesday schedule remaining discipline evaluations as ordered on separate consecutive days following the start of care.    Thursday SOC -schedule subsequent evaluations Friday and Monday the following week.     Friday - Saturday SOC - schedule subsequent discipline evaluations on consecutive days starting Monday of the following week.    For all post-discharge communication and subsequent orders please contact patient's primary care physician. If unable to reach primary care physician or do not receive response within 30 minutes, please contact [phone] for clinical staff order clarification        I certify that this patient is confined to her home and needs physical therapy and occupational therapy.

## 2022-03-07 NOTE — PLAN OF CARE
[facility] - Telemetry Stepdown ([facility])  Discharge Final Note    Primary Care Provider: Virginia Foote MD  Expected Discharge Date: 3/7/2022    Patient medically ready for discharge to home with home health.  Nurse can call report to n/a.  Transportation yes per [employer].   Is family/patient aware of discharge yes - patient.  Hospital follow up scheduled, yes, in AVS.  Final Discharge Note (most recent)       Final Note - 03/07/22 1419          Final Note    Assessment Type Final Discharge Note     Anticipated Discharge Disposition Home-Health Care [facility]     What phone number can be called within the next 1-3 days to see how you are doing after discharge? [phone]        Post-Acute Status    Post-Acute Authorization Home Health (P)      Home Health Status Set-up Complete/Auth obtained (P)      Coverage [employer] (P)      Discharge Delays None known at this time (P)                      Important Message from Medicare         Referral Info (most recent)       Referral Info    No documentation.                   Future Appointments   Date Time Provider Department Center   3/14/2022 11:30 AM Virginia Foote MD [facility] IM [place]   3/15/2022  2:00 PM Virginia Foote MD [facility] IM [place]   3/30/2022  1:40 PM Teresita Barney MD [facility] C[facility]IO [facility]   5/31/2022  1:00 PM EKG, APPT [facility]   5/31/2022  1:20 PM Joe Kuo MD [facility] AR[facility]HYTH [facility]  Case Management  Ext: [phone]  03/07/2022

## 2022-03-07 NOTE — DISCHARGE SUMMARY
[facility] - Telemetry Stepdown ([place] [address])  [facility] Medicine  Discharge Summary      Patient Name: Kylie King  MRN: [id number]  Patient Class: OP- Observation  Admission Date: 3/3/2022  Hospital Length of Stay: 0 days  Discharge Date and Time: 3/7/2022  2:00 PM  Attending Physician: Ousmane Vera MD  Discharging Provider: Joi Willard PA-C  Primary Care Provider: Virginia Foote MD  [facility] Medicine Team: [facility] MED E Joi Willard PA-C    HPI:   Kylie King is a 66 y.o. year old female with PMHx significant for CAD s/p PCI on 02/08/2022 (on DAPT), left breast cancer s/p mastectomy, chronic respiratory failure (on home 2L of O2), COPD, ESRD on HD TTS w/ anemia of chronic disease, Atrial Fib (failed DCCV in Nov 2021); on eliquis and amiodarone, CVA, PVD (s/p left foot amputation on Plavix), HTN, T2DM, HLD admitted to observation for shortness of breath 2/2 volume overload. Patient reports that she started feeling extremely short of breath with associated chest tightness x 2 days ago. Reports compliance with home oxygen. Her last HD session was on 2/26. She reports that she got tangle her bed sheets and fell out of bed. Endorses head trauma, but is unsure if she lost consciousness. She also complains of RUQ aching abdominal pain that does not radiate for the last 2 days. Denies fever/chills, HA, cough, n/v, diarrhea, constipation, changes in appetite.     In the ED, patient was tachypneic to max 26. O2 sats >94% on 3-4L NC. Remaining vitals stable. K 8.4 (hemolyzed). BNP 4691 (elevated from baseline). Troponin I 0.529. CXR with cardiomegaly and coarsened interstitial lung markings. CTH, CT C spine, CT abd/pelvis pending. Given duoneb x 3, calcium gluconate x1, insulin 5 units x1, sodium bicard 50mEq x1, dextrose 10% 250 mL x1. Nephrology consulted, patient dialyzed this morning.       * No surgery found *      Hospital Course:   Pt placed in observation for hyperkalemia. K elevated to  8.4 on admission. Hypervolemia noted on CXR w/ coarsened interstitial lung marking. Pt was shifted in the ED and nephrology was consulted for urgent HD needs. 3L UF w/ improvement of hypervolemia. CT head showed no acute intracranial hemorrhage or recent major vascular territory infarct. CT C spine showed no acute fracture or traumatic malalignment of the c spine. CT abd/pelvis showed no detrimental change or acute process seen within the abdomen or pelvis.   Nephrology continued following for HD needs (2 sessions IP). PTOT consulted recommended HH OT and PT. Pt medically ready for discharge. Instructed to maintain HD appointment scheduled for the following day 3/8/22. Pt given referrals for care at home and ready responders. HH orders renewed. Pt educated on hospital course and plan, verbally agrees and understands. All questions answered.        Goals of Care Treatment Preferences:  Code Status: Full Code      Consults:   Consults (From admission, onward)        Status Ordering Provider     Inpatient consult to Nephrology  Once        Provider:  (Not yet assigned)    Completed PARVIN RDZ          * End-stage renal disease on hemodialysis  -HD 3/4/22  -avoid nephrotoxins  -nephrology following for HD needs, recs appreciated  -3L UF  -EKG, Calcium gluconate, and shifting if K>6.0  -continue to monitor      Hyperkalemia  -on admission 8.4 in setting of ESRD  -shifted w/ albuterol x 3, calcium gluconate x1, insulin 5 units x1, improved to 5  -continue to monitor      Final Active Diagnoses:    Diagnosis Date Noted POA    PRINCIPAL PROBLEM:  End-stage renal disease on hemodialysis [N18.6, Z99.2]  Not Applicable     Chronic    Hyperkalemia [E87.5] 02/15/2021 Yes    Hypertension [I10] 12/21/2012 Yes     Chronic    Type II diabetes mellitus [E11.9] 12/22/2012 Yes     Chronic    NSTEMI (non-ST elevated myocardial infarction) [I21.4] 02/05/2022 Yes     Chronic    Atrial fibrillation [I48.91]  Yes     Chronic     Anemia in ESRD (end-stage renal disease) [N18.6, D63.1] 08/02/2021 Yes    Chronic respiratory failure with hypoxia [J96.11]  Yes    Secondary hyperparathyroidism [N25.81] 05/11/2021 Yes     Chronic    Hyperphosphatemia [E83.39] 10/14/2020 Yes    COPD (chronic obstructive pulmonary disease) [J44.9] 06/08/2018 Yes     Chronic    Morbid obesity [E66.01] 03/31/2017 Yes     Chronic    Hyperlipidemia [E78.5] 12/21/2012 Yes      Problems Resolved During this Admission:       Discharged Condition: good    Disposition: Home-Health Care [facility]    Follow Up:    Patient Instructions:      Ambulatory referral/consult to [facility]   Standing Status: Future   Referral Priority: Urgent Referral Type: Consultation   Referral Reason: Specialty Services Required   Number of Visits Requested: 1     Notify your health care provider if you experience any of the following:  temperature >100.4     Notify your health care provider if you experience any of the following:  increased confusion or weakness     Notify your health care provider if you experience any of the following:  persistent dizziness, light-headedness, or visual disturbances     Notify your health care provider if you experience any of the following:  difficulty breathing or increased cough     Activity as tolerated     Ambulatory Request for Ready Responder Services   Standing Status: Future Standing Exp. Date: 03/07/23     Order Specific Question Answer Comments   Program referred to: COVID-19 Vaccine        Significant Diagnostic Studies: Labs: All labs within the past 24 hours have been reviewed    Pending Diagnostic Studies:     Procedure Component Value Units Date/Time    Stool Exam-Ova,Cysts,Parasites [[id number]] Collected: 03/05/22 1806    Order Status: Sent Lab Status: In process Updated: 03/05/22 1925    Specimen: Stool          Medications:  Reconciled Home Medications:      Medication List      CONTINUE taking these  medications    acetaminophen-codeine 300-30mg 300-30 mg Tab  Commonly known as: TYLENOL #3  Take 1 tablet by mouth every 8 (eight) hours as needed.     albuterol 90 mcg/actuation inhaler  Commonly known as: PROVENTIL/VENTOLIN HFA  Inhale 2 puffs into the lungs every 6 (six) hours as needed for Wheezing. Rescue     amiodarone 200 MG Tab  Commonly known as: PACERONE  Take 1 tablet (200 mg total) by mouth once daily.     apixaban 2.5 mg Tab  Commonly known as: ELIQUIS  Take 1 tablet (2.5 mg total) by mouth 2 (two) times daily.     aspirin 81 MG EC tablet  Commonly known as: ECOTRIN  Take 1 tablet (81 mg total) by mouth once daily. Take until 3/8/22, then stop unless further instructed by provider.     atorvastatin 40 MG tablet  Commonly known as: LIPITOR  Take 1 tablet (40 mg total) by mouth once daily.     blood sugar diagnostic Strp  1 strip by Misc.(Non-Drug; Combo Route) route 4 (four) times daily.     clopidogreL 75 mg tablet  Commonly known as: PLAVIX  Take 1 tablet (75 mg total) by mouth once daily.     DEXCOM G6 SENSOR Umm  Generic drug: blood-glucose sensor  1 each by Misc.(Non-Drug; Combo Route) route every 10 days.     DEXCOM G6 TRANSMITTER Umm  Generic drug: blood-glucose transmitter  1 Device by Misc.(Non-Drug; Combo Route) route continuous.     fish oil-omega-3 fatty acids 300-1,000 mg capsule  Take 1 capsule by mouth every morning.     * FREESTYLE LULU 14 DAY READER Misc  Generic drug: flash glucose scanning reader  Use as directed to check blood sugars     * FREESTYLE LULU 2 READER [facility]  Generic drug: flash glucose scanning reader  1 Units by Misc.(Non-Drug; Combo Route) route continuous prn.     * FREESTYLE LULU 14 DAY SENSOR Kit  Generic drug: flash glucose sensor  Use as directed to check blood sugars     * FREESTYLE LULU 2 SENSOR Kit  Generic drug: flash glucose sensor  1 Units by Misc.(Non-Drug; Combo Route) route every 14 (fourteen) days.     insulin aspart U-100 100 unit/mL (3 mL) Inpn  pen  Commonly known as: NovoLOG  Inject 3 Units into the skin 3 (three) times daily with meals.     insulin detemir U-100 100 unit/mL (3 mL) Inpn pen  Commonly known as: Levemir FLEXTOUCH  Inject 4 Units into the skin 2 (two) times daily.     lancets 33 gauge Misc  Commonly known as: ONETOUCH DELICA LANCETS  1 lancet by Misc.(Non-Drug; Combo Route) route 4 (four) times daily before meals and nightly.     methocarbamoL 500 MG Tab  Commonly known as: ROBAXIN  Take 1 tablet (500 mg total) by mouth 3 (three) times daily.     metoprolol tartrate 25 MG tablet  Commonly known as: LOPRESSOR  Take 1 tablet (25 mg total) by mouth 2 (two) times daily.     nitroGLYCERIN 0.3 MG SL tablet  Commonly known as: NITROSTAT  Place 1 tablet (0.3 mg total) under the tongue every 5 (five) minutes as needed for Chest pain.     senna-docusate 8.6-50 mg 8.6-50 mg per tablet  Commonly known as: PERICOLACE  Take 1 tablet by mouth 2 (two) times daily.     sevelamer carbonate 800 mg Tab  Commonly known as: RENVELA  Take 2 tablets (1,600 mg total) by mouth 3 (three) times daily with meals.         * This list has 4 medication(s) that are the same as other medications prescribed for you. Read the directions carefully, and ask your doctor or other care provider to review them with you.                Indwelling Lines/Drains at time of discharge:   Lines/Drains/Airways     Drain  Duration                Hemodialysis AV Fistula 02/17/22 Right forearm 18 days                Time spent on the discharge of patient: 36 minutes         Joi Willard PA-C  Department of Hospital Medicine  [facility] - Telemetry Stepdown ([place]

## 2022-03-07 NOTE — PLAN OF CARE
Problem: Diabetes Comorbidity  Goal: Blood Glucose Level Within Targeted Range  Outcome: Ongoing, Progressing  Intervention: Monitor and Manage Glycemia  Flowsheets (Taken 3/7/2022 1034)  Glycemic Management: blood glucose monitored

## 2022-03-07 NOTE — PLAN OF CARE
Pt in bed, awake, alert and oriented X3.  No distress noted.  RR regular, even and unlabored.  Up to bathroom w/ therapy and had BM.

## 2022-03-07 NOTE — NURSING
Pt discharged to home. Pt verbalized understanding discharge instruction, how to take prescriptions, and the importance of attending follow up appointments. Patient and family given opportunity to ask questions. IV removed. NAD noted. Patient getting dressed nurse to notify CM when patient is ready for UBER to be ordered.      1400-Pt brought down in wheelchair to ER to catch Lyft. Pt has all personal belongings. NAD noted.

## 2022-03-08 NOTE — TELEPHONE ENCOUNTER
----- Message from Patti Moraes sent at 3/8/2022 11:45 AM CST -----  Contact: Vira kimbrough   Contact: Vira Shaikh [phone]  Vira kimbrough is calling in regards to a fax they sent over for the pt and is asking if this has been received she sent this over last week she also left a message please advise and give  return call       The Rx is missing the leader flow setting, this should be circled on the form and initial/date     Reviewed record in preparation for visit and have obtained necessary documentation. Identified pt with two pt identifiers(name and ). Chief Complaint   Patient presents with    Hypertension     follow up    Cholesterol Problem       There are no preventive care reminders to display for this patient. Mr. Meryle Kea has a reminder for a \"due or due soon\" health maintenance. I have asked that he discuss health maintenance topic(s) due with His  primary care provider. Coordination of Care Questionnaire:  :     1) Have you been to an emergency room, urgent care clinic since your last visit? no   Hospitalized since your last visit? no             2) Have you seen or consulted any other health care providers outside of [facility] since your last visit? no  (Include any pap smears or colon screenings in this section.)    3) Do you have an Advance Directive on file? no    4) Are you interested in receiving information on Advance Directives? NO    Patient is accompanied by self I have received verbal consent from Lakeisha Moctezuma to discuss any/all medical information while they are present in the room.

## 2022-03-15 NOTE — PROGRESS NOTES
CC: followup of hospitalization for hyperkalemia  HPI:  The patient is a 66 y.o. year old female who presents to the office for followup of hospitalization for hyperkalemia.  She was admitted to observation for shortness of breath secondary to volume overload.  She reported onset of shortness of breath associated with chest tightness 2 days prior to admission.  She also reports head trauma secondary to falling out of the bed recently, but is unsure if she lost consciousness.  In the emergency department, she was tachypneic to maximum respiratory rate of 26. Oxygen saturation is greater than 94% on 3-4 L of oxygen.  Potassium was 8.4.  BNP is elevated at 4691. She underwent urgent hemodialysis.  Head CT showed no acute intracranial hemorrhage or recent major vascular territory infarct.  CT of abdomen and pelvis showed no detrimental change or acute process seen within the abdomen or pelvis.  Her symptoms improved and he was discharged home with home health.  She reports 6 week history of intermittent diarrhea.  She denies any abdominal pain, but does report gas today.  She also reports nausea while hospitalized.  She was unable to go to dialysis on 3 separate occasions secondary to diarrhea.  She reports soreness in her back.    PAST MEDICAL HISTORY:  Past Medical History:   Diagnosis Date    Anxiety     Breast cancer     left    Carotid artery disease     Cataract     Choledocholithiasis     s/p ERCP and stent placement    Chronic diastolic CHF (congestive heart failure)     Chronic obstructive pulmonary disease     Chronic venous insufficiency     Depression     End-stage renal disease on hemodialysis     M/W/F    GERD (gastroesophageal reflux disease)     History of breast cancer     History of colon cancer 2001    s/p sigmoid colectomy    History of kidney stones     History of osteomyelitis of left foot     History of pancreatitis     History of stroke     Hyperlipidemia     Hypertension      Intracerebral hemorrhage     Lumbar degenerative disc disease     Lumbar spinal stenosis     Morbid obesity     Paroxysmal atrial fibrillation     Peripheral artery disease     Peripheral neuropathy     Tobacco dependence     Type II diabetes mellitus     Urinary incontinence        SURGICAL HISTORY:  Past Surgical History:   Procedure Laterality Date    ANGIOGRAM, CORONARY, WITH LEFT HEART CATHETERIZATION N/A 2/7/2022    Procedure: Angiogram, Coronary, with Left Heart Cath;  Surgeon: Adam Rutherford MD;  Location: [facility] CATH LAB;  Service: Cardiology;  Laterality: N/A;    ANGIOGRAPHY OF LOWER EXTREMITY Right 9/17/2020    Procedure: Angiogram Extremity Unilateral;  Surgeon: RICK Pollard III, MD;  Location: [address];  Service: Peripheral Vascular;  Laterality: Right;  6.2 minutes of fluro  690.88 mGy  112.64 Gycm2  55 ml    ANGIOGRAPHY OF LOWER EXTREMITY Left 5/13/2021    Procedure: Angiogram Extremity Unilateral;  Surgeon: HOMERO Hyaden II, MD;  Location: [address];  Service: Vascular;  Laterality: Left;  35.1 min  971.41 mGy  190.27 Gy.cm  101ml Dye    ANGIOGRAPHY OF LOWER EXTREMITY Right 7/19/2021    Procedure: Angiogram Extremity Unilateral;  Surgeon: HOMERO Hayden II, MD;  Location: [address];  Service: Vascular;  Laterality: Right;  3.0 min  121.00 mGy  26.8713 Gy.cm  13ml Dye    AORTOGRAPHY N/A 5/13/2021    Procedure: AORTOGRAM;  Surgeon: HOMERO Hayden II, MD;  Location: [address];  Service: Vascular;  Laterality: N/A;    AV FISTULA PLACEMENT Right 5/28/2018    Procedure: CREATION -FISTULA-AV;  Surgeon: RICK Pollard III, MD;  Location: [address];  Service: Peripheral Vascular;  Laterality: Right;    BREAST BIOPSY  1/2012    left breast invasive mammary carcinoma (lateral bx) and intraductal papilloma (medial bx)    CARDIOVERSION  11/22/2021    CARDIOVERSION  11/22/2021    Procedure: Cardioversion;  Surgeon: Ad Garcia MD;  Location[facility] CATH  LAB;  Service: Cardiology;;    CATARACT EXTRACTION W/  INTRAOCULAR LENS IMPLANT Right 1/24/2019        CATARACT EXTRACTION W/  INTRAOCULAR LENS IMPLANT Left 1/31/2019    Procedure: EXTRACTION, CATARACT, WITH IOL INSERTION;  Surgeon: Immanuel Moncada MD;  Location: [facility] OR;  Service: Ophthalmology;  Laterality: Left;    CHOLECYSTECTOMY  2001    CORONARY ANGIOGRAPHY N/A 2/8/2022    Procedure: ANGIOGRAM, CORONARY ARTERY;  Surgeon: Abelardo Betancur MD;  Location: [facility] CATH LAB;  Service: Cardiology;  Laterality: N/A;    DEBRIDEMENT OF FOOT Right 2/17/2021    Procedure: DEBRIDEMENT, FOOT right plantar ulcer;  Surgeon: Sonja Corral DPM;  Location: [facility] OR;  Service: Podiatry;  Laterality: Right;    ERCP W/ SPHICTEROTOMY      FINE NEEDLE ASPIRATION  1999    Right breast    FOOT AMPUTATION Left 6/1/2021    Procedure: Transmetatarsal amputation;  Surgeon: Billy Robles DPM;  [facility];  Service: Podiatry;  Laterality: Left;    FOOT AMPUTATION Left 7/23/2021    Procedure: AMPUTATION, FOOT;  Surgeon: Billy Robles DPM;  [address]  Service: Podiatry;  Laterality: Left;    FOOT AMPUTATION THROUGH METATARSAL Right 10/15/2020    Procedure: PARTIAL RAY AMPUTATION GREAT TOE;  Surgeon: Billy Robles DPM;  [facility];  Service: Podiatry;  Laterality: Right;  Stretcher OK    HERNIA REPAIR      LAPAROSCOPIC NEPHRECTOMY Left 2011    MASTECTOMY  2013    left    OOPHORECTOMY Left 2001    REVISION OF ARTERIOVENOUS FISTULA Right 8/15/2018    Procedure: REVISION, AV FISTULA;  Surgeon: RICK Pollard III, MD;  Location: [address];  Service: Peripheral Vascular;  Laterality: Right;    SIGMOIDECTOMY  2001    SURGICAL REMOVAL OF METATARSAL HEAD Right 2/17/2021    Procedure: OSTECTOMY, METATARSAL BONE, diatal 1st;  Surgeon: Sonja Corral DPM;  Location: [facility];  Service: Podiatry;  Laterality: Right;    TOE AMPUTATION Left 5/13/2021    Procedure: AMPUTATION,  TOE;  Surgeon: HOMERO Hayden II, MD;  Location: [facility] OR [address] [place] [address];  Service: Vascular;  Laterality: Left;    TOTAL REDUCTION MAMMOPLASTY Right 2013       MEDS:  Medcard reviewed and updated    ALLERGIES: Allergy Card reviewed and updated    SOCIAL HISTORY:   The patient is a nonsmoker.    PE:   APPEARANCE: Well nourished, well developed, in no acute distress.    CHEST: Lungs clear to auscultation with unlabored respirations.  CARDIOVASCULAR: Normal S1, S2. No murmurs. No carotid bruits. No pedal edema.  ABDOMEN: Bowel sounds normal. Not distended. Soft. No tenderness or masses.   MUSCULOSKELETAL:  In wheelchair.   PSYCHIATRIC: The patient is oriented to person, place, and time and has a pleasant affect.        ASSESSMENT/PLAN:  Kylie was seen today for follow-up and low-back pain.    Diagnoses and all orders for this visit:    Hyperkalemia  -     resolved    PAF (paroxysmal atrial fibrillation)  -    continue anticoagulation    Type 2 diabetes mellitus with chronic kidney disease on chronic dialysis, with long-term current use of insulin  -     continue insulin    Need for 23-polyvalent pneumococcal polysaccharide vaccine  -     (In Office Administered) Pneumococcal Polysaccharide Vaccine (23 Valent) (SQ/IM)

## 2022-03-30 PROBLEM — I65.29 CAROTID ARTERY STENOSIS: Chronic | Status: ACTIVE | Noted: 2018-08-13

## 2022-03-30 PROBLEM — J44.9 COPD WITHOUT EXACERBATION: Status: ACTIVE | Noted: 2022-01-01

## 2022-03-30 PROBLEM — Z79.4 TYPE 2 DIABETES MELLITUS WITH DIABETIC PERIPHERAL ANGIOPATHY AND GANGRENE, WITH LONG-TERM CURRENT USE OF INSULIN: Chronic | Status: ACTIVE | Noted: 2021-01-01

## 2022-03-30 PROBLEM — J43.8 OTHER EMPHYSEMA: Status: ACTIVE | Noted: 2018-06-08

## 2022-03-30 PROBLEM — S32.599A PUBIC RAMUS FRACTURE: Status: RESOLVED | Noted: 2022-01-01 | Resolved: 2022-01-01

## 2022-03-30 PROBLEM — I87.2 CHRONIC VENOUS INSUFFICIENCY: Chronic | Status: ACTIVE | Noted: 2018-03-20

## 2022-03-30 PROBLEM — E11.52 TYPE 2 DIABETES MELLITUS WITH DIABETIC PERIPHERAL ANGIOPATHY AND GANGRENE, WITH LONG-TERM CURRENT USE OF INSULIN: Chronic | Status: ACTIVE | Noted: 2021-01-01

## 2022-03-30 PROBLEM — M19.90 OSTEOARTHRITIS: Chronic | Status: ACTIVE | Noted: 2019-02-13

## 2022-03-30 PROBLEM — R94.31 PROLONGED Q-T INTERVAL ON ECG: Status: ACTIVE | Noted: 2022-01-01

## 2022-03-30 NOTE — Clinical Note
The catheter was inserted into the left ventricle. Hemodynamics were performed.  An angiography was performed of the LV. Multiple views were taken.

## 2022-03-30 NOTE — HPI
"   66 y.o. year old female with PMHx significant for CAD s/p PCI on 02/08/2022 (2.20Y26CH stent X2, "V stenting on plavix only), left breast cancer s/p mastectomy, chronic respiratory failure (on home 2L of O2), COPD, ESRD on HD TTS w/ anemia of chronic disease, Atrial Fib (failed DCCV in Nov 2021); on eliquis and amiodarone, CVA, PVD (s/p left foot amputation on Plavix), HTN, T2DM with history of osteomyelitis, HLD admitted to observation for shortness of breath 2/2 volume overload.      Interval History:  She had a recent hospital admission on 2/4/22 with a cc of dyspnea admitted with NSTEMI s/p PCI to LAD and ostial Lcx (2.20Z79KD stent X2, "V stenting") . Cath note from that hospitalization recommended discharge on triple therapy for 6 mos ( end date 8/22) however she reports she was instructed to continue triple therapy for one month only and stopped ASA one week ago . In addition, she has had history of hyperkalemia ( up to 8.4) with another recent admission requiring shifting (3/7/22). Most recent labwork showing k 5.0. Of note, prior notes with history of medication non adherence. She was seen by myself yesterday in clinic after she had a fall whenn transitiong from sitting to standing and cut her last HD session short yesterday only going for 1.5 hours (due to diarrhea) when she usually goes for 5 hours. Her reported dry weight is 99kgs and today also reports worsening orthopnea and was wokened up from bed with SOB at 4 am. Of note, she is anuri and very sensitive to missed or shortened dialysis sessions and has had several recent hospitalizations from volume/ electrolytes issues. In regards to her ongoing diarrhea, unclear cause as she denies recent ABx use and recent C. Diff testing negative. Denies taking laxatives even though listed in med rec.     Hospital course:   She was subsequently admitted to ED for urgent HD however noted to have NSTEMI and admitted to hospital medicine. Trop notable for rise " "4--> 25 with EKG showng IVCD/LBBB and TTE with worsening EF 40%--> 28% and RWMA in LAD and Lcx territory. Per my exam, patient asymptomatic however she was also asymptomatic with last NSTEMI presentation. She was loaded with ASA and plavix and on hep gtt however labwork notable for persistent hyperkalemia K 5.7 Hg 12 Plt 168. She is scheduled for HD today. IC has been consulted for repeat C.      LHC 2/8/22  1. Successful treatment of a heavily calcified ostial left circumflex stenosis which cause admission for unstable angina.  2. "V"stenting of the LAD and circumflex ostium was accomplished.  High-pressure balloon inflations were used.    Summary:  1. Successful treatment of critical ostial left circumflex disease by doing V stenting of the ostial left main and left circumflex.  Distal diagonal lesion left untreated.  2. End-stage renal disease on hemodialysis  3. Chronic will send area was unstable angina and non-STEMI.  Recommendations:  1. Dual antiplatelet therapy for 6-12 months  2. Postop care  3. follow-up in General Cardiology and with her PCP and dialysis physicians.    TTE 3/31/22  The left ventricle is mildly enlarged with mild eccentric hypertrophy and severely decreased systolic function.  The estimated ejection fraction is 28%.  There are segmental left ventricular wall motion abnormalities.  Grade II left ventricular diastolic dysfunction.  Severe left atrial enlargement.  Mild right ventricular enlargement with low normal right ventricular systolic function.  Mild right atrial enlargement.  There is mild aortic valve stenosis.  Aortic valve area is 1.62 cm2; peak velocity is 1.46 m/s; mean gradient is 4 mmHg.  Mild mitral regurgitation.  Elevated central venous pressure (15 mmHg).     TTE 2/5/22  · Severe left atrial enlargement.  · The left ventricle is normal in size with mildly decreased systolic function.  · The estimated ejection fraction is 40%.  · There are segmental left ventricular wall " "motion abnormalities: apex, apical septum, mid and apical anterolateral, apical inferior and apical anterior wall.  · Grade II left ventricular diastolic dysfunction.  · Normal right ventricular size with normal right ventricular systolic function.  · There is mild aortic valve stenosis.  · Aortic valve area is 1.78 cm2; peak velocity is 0.71 m/s; mean gradient is 6 mmHg.  · Intermediate central venous pressure (8 mmHg).  · The estimated PA systolic pressure is 19 mmHg.    In the ER, patietn found to be hypertensive 171/88 but otherwise hemodynamically stable. ECG showing non-specific st-t wave changes. Labs notable for troponin of 3.5, bnp of 4550, potassium of 7, potassium of 9.      [facility] 2/8/22  1. Successful treatment of a heavily calcified ostial left circumflex stenosis which cause admission for unstable angina.  2. "V"stenting of the LAD and circumflex ostium was accomplished.  High-pressure balloon inflations were used.    Summary:  1. Successful treatment of critical ostial left circumflex disease by doing V stenting of the ostial left main and left circumflex.  Distal diagonal lesion left untreated.  2. End-stage renal disease on hemodialysis  3. Chronic will send area was unstable angina and non-STEMI.  Recommendations:  1. Dual antiplatelet therapy for 6-12 months  2. Postop care  3. follow-up in General Cardiology and with her PCP and dialysis physicians.    Diagnostic 2/7/22      PCI 2/8/22    TTE 2/5/22  ·           Severe left atrial enlargement.  ·           The left ventricle is normal in size with mildly decreased systolic function.  ·           The estimated ejection fraction is 40%.  ·           There are segmental left ventricular wall motion abnormalities: apex, apical septum, mid and apical anterolateral, apical inferior and apical anterior wall.  ·           Grade II left ventricular diastolic dysfunction.  ·           Normal right ventricular size with normal right ventricular systolic " function.  ·           There is mild aortic valve stenosis.  ·           Aortic valve area is 1.78 cm2; peak velocity is 0.71 m/s; mean gradient is 6 mmHg.  ·           Intermediate central venous pressure (8 mmHg).  ·           The estimated PA systolic pressure is 19 mmHg.

## 2022-03-30 NOTE — PROGRESS NOTES
A/ Plan:   #ESRD with anuria  -recommend admit to hospital medicine under observation for urgent HD 2/2 hypovolemia, access right AVF  -dry weight 99 kgs and currently 5- 7 kgs over with increased WOB     #HFpEF  -unable to begin entresto and SGLTi due to renal dysfunction, of note history of hyperkalemia in past  - BP control      #HTN, well controlled  -switch lopressor to metop succ 50 for ease of adherence prior to discharge  - anuric, hold off diuretics    #NSTEMI  - s/p PCI 2/22  -continue plavix 75mg daily and eliquis 2.5mg BID only, on note history of remote pontine ICM MRI 2019  -after above date recommend stopping aspirin and continuing plavix and eliquis indef  -continue statin and BB  -begin prn SL nitro prior to discharge  -labwork for further risk stratification including LDL and a1c (non fasting)    #pAF  -defer to EP    RTC after discharge from hospitalization         Subjective:     HPI:   Kylie King who presents as a follow up patient post hospitalization with following problem list:     66 y.o. year old female with PMHx significant for CAD s/p PCI on 02/08/2022 (on plavix only), left breast cancer s/p mastectomy, chronic respiratory failure (on home 2L of O2), COPD, ESRD on HD TTS w/ anemia of chronic disease, Atrial Fib (failed DCCV in Nov 2021); on eliquis and amiodarone, CVA, PVD (s/p left foot amputation on Plavix), HTN, T2DM with history of osteomyelitis, HLD admitted to observation for shortness of breath 2/2 volume overload.      Interval History:  She had a recent hospital admission on 2/4/22 with a cc of dyspnea admitted with NSTEMI s/p PCI to   ostial Lcx (2.82H55NP stent X2 . Cath note from that hospitalization recommended discharge on triple therapy for 6 mos ( end date 8/22) however she reports she was instructed to continue triple therapy for one month only and stopped ASA one week ago . In addition, she has had history of hyperkalemia ( up to 8.4) with another recent  "admission requiring shifting (3/7/22). Most recent labwork showing k 5.0. Of note, prior notes with history of medication non adherence. Today in clinic she had a fall whenn transitiong from sitting to standing and cut her last HD session short yesterday only going for 1.5 hours (due to diarrhea) when she usually goes for 5 hours. Her reported dry weight is 99kgs and today also reports worsening orthopnea and was wokened up from bed with SOB at 4 am. Of note, she is anuri and very sensitive to missed or shortened dialysis sessions and has had several recent hospitalizations from volume/ electrolytes issues. In regards to her ongoing diarrhea, unclear cause as she denies recent ABx use and recent C. Diff testing negative. Denies taking laxatives even though listed in med rec.     [facility] 2/8/22  1. Successful treatment of a heavily calcified ostial left circumflex stenosis which cause admission for unstable angina.  2. "V"stenting of the LAD and circumflex ostium was accomplished.  High-pressure balloon inflations were used.    Summary:  1. Successful treatment of critical ostial left circumflex disease by doing V stenting of the ostial left main and left circumflex.  Distal diagonal lesion left untreated.  2. End-stage renal disease on hemodialysis  3. Chronic will send area was unstable angina and non-STEMI.  Recommendations:  1. Dual antiplatelet therapy for 6-12 months  2. Postop care  3. follow-up in General Cardiology and with her PCP and dialysis physicians.  TTE 2/5/22  · Severe left atrial enlargement.  · The left ventricle is normal in size with mildly decreased systolic function.  · The estimated ejection fraction is 40%.  · There are segmental left ventricular wall motion abnormalities: apex, apical septum, mid and apical anterolateral, apical inferior and apical anterior wall.  · Grade II left ventricular diastolic dysfunction.  · Normal right ventricular size with normal right ventricular systolic " function.  · There is mild aortic valve stenosis.  · Aortic valve area is 1.78 cm2; peak velocity is 0.71 m/s; mean gradient is 6 mmHg.  · Intermediate central venous pressure (8 mmHg).  · The estimated PA systolic pressure is 19 mmHg.      ROS:  Constitution: Negative for fever, chills, weight loss or gain.   HENT: Negative for sore throat, rhinorrhea, or headache.  Eyes: Negative for blurred or double vision.   Cardiovascular: - for Exertional chest pain  Pulmonary: - for Dyspnea on exertion  Gastrointestinal: Negative for abdominal pain, nausea, vomiting, or diarrhea. Negative for melena/hematochezia.  : Negative for dysuria.   Neurological: Negative for focal weakness or sensory changes.  Skin: No bleeding or bruising      Past Medical History:   Diagnosis Date    Anxiety     Breast cancer     left    Carotid artery disease     Cataract     Choledocholithiasis     s/p ERCP and stent placement    Chronic diastolic CHF (congestive heart failure)     Chronic obstructive pulmonary disease     Chronic venous insufficiency     Depression     End-stage renal disease on hemodialysis     M/W/F    GERD (gastroesophageal reflux disease)     History of breast cancer     History of colon cancer 2001    s/p sigmoid colectomy    History of kidney stones     History of osteomyelitis of left foot     History of pancreatitis     History of stroke     Hyperlipidemia     Hypertension     Intracerebral hemorrhage     Lumbar degenerative disc disease     Lumbar spinal stenosis     Morbid obesity     Paroxysmal atrial fibrillation     Peripheral artery disease     Peripheral neuropathy     Tobacco dependence     Type II diabetes mellitus     Urinary incontinence        Past Surgical History:   Procedure Laterality Date    ANGIOGRAM, CORONARY, WITH LEFT HEART CATHETERIZATION N/A 2/7/2022    Procedure: Angiogram, Coronary, with Left Heart Cath;  Surgeon: Adam Rutherford MD;  Location: [facility] CATH LAB;   Service: Cardiology;  Laterality: N/A;    ANGIOGRAPHY OF LOWER EXTREMITY Right 9/17/2020    Procedure: Angiogram Extremity Unilateral;  Surgeon: RICK Pollard III, MD;  Location: [address];  Service: Peripheral Vascular;  Laterality: Right;  6.2 minutes of fluro  690.88 mGy  112.64 Gycm2  55 ml    ANGIOGRAPHY OF LOWER EXTREMITY Left 5/13/2021    Procedure: Angiogram Extremity Unilateral;  Surgeon: HOMERO Hayden II, MD;  Location: [address];  Service: Vascular;  Laterality: Left;  35.1 min  971.41 mGy  190.27 Gy.cm  101ml Dye    ANGIOGRAPHY OF LOWER EXTREMITY Right 7/19/2021    Procedure: Angiogram Extremity Unilateral;  Surgeon: HOMERO Hayden II, MD;  Location: [address];  Service: Vascular;  Laterality: Right;  3.0 min  121.00 mGy  26.8713 Gy.cm  13ml Dye    AORTOGRAPHY N/A 5/13/2021    Procedure: AORTOGRAM;  Surgeon: HOMERO Hayden II, MD;  Location: [address];  Service: Vascular;  Laterality: N/A;    AV FISTULA PLACEMENT Right 5/28/2018    Procedure: CREATION -FISTULA-AV;  Surgeon: RICK Pollard III, MD;  Location: [address];  Service: Peripheral Vascular;  Laterality: Right;    BREAST BIOPSY  1/2012    left breast invasive mammary carcinoma (lateral bx) and intraductal papilloma (medial bx)    CARDIOVERSION  11/22/2021    CARDIOVERSION  11/22/2021    Procedure: Cardioversion;  Surgeon: Ad Garcia MD;  Location: [facility] CATH LAB;  Service: Cardiology;;    CATARACT EXTRACTION W/  INTRAOCULAR LENS IMPLANT Right 1/24/2019        CATARACT EXTRACTION W/  INTRAOCULAR LENS IMPLANT Left 1/31/2019    Procedure: EXTRACTION, CATARACT, WITH IOL INSERTION;  Surgeon: Immanuel Moncada MD;  Location: [facility];  Service: Ophthalmology;  Laterality: Left;    CHOLECYSTECTOMY  2001    CORONARY ANGIOGRAPHY N/A 2/8/2022    Procedure: ANGIOGRAM, CORONARY ARTERY;  Surgeon: Abelardo Betancur MD;  Location: [facility] CATH LAB;  Service: Cardiology;  Laterality: N/A;    DEBRIDEMENT  OF FOOT Right 2021    Procedure: DEBRIDEMENT, FOOT right plantar ulcer;  Surgeon: Sonja Corral DPM;  Location: [facility] OR;  Service: Podiatry;  Laterality: Right;    ERCP W/ SPHICTEROTOMY      FINE NEEDLE ASPIRATION      Right breast    FOOT AMPUTATION Left 2021    Procedure: Transmetatarsal amputation;  Surgeon: Billy Robles DPM;  Location: [facility] OR [facility] FLR;  Service: Podiatry;  Laterality: Left;    FOOT AMPUTATION Left 2021    Procedure: AMPUTATION, FOOT;  Surgeon: Billy Robles DPM;  [facility];  Service: Podiatry;  Laterality: Left;    FOOT AMPUTATION THROUGH METATARSAL Right 10/15/2020    Procedure: PARTIAL RAY AMPUTATION GREAT TOE;  Surgeon: Billy Robles DPM;  [facility];  Service: Podiatry;  Laterality: Right;  Stretcher OK    HERNIA REPAIR      LAPAROSCOPIC NEPHRECTOMY Left     MASTECTOMY      left    OOPHORECTOMY Left     REVISION OF ARTERIOVENOUS FISTULA Right 8/15/2018    Procedure: REVISION, AV FISTULA;  Surgeon: RICK Pollard III, MD;  Location: [facility];  Service: Peripheral Vascular;  Laterality: Right;    SIGMOIDECTOMY      SURGICAL REMOVAL OF METATARSAL HEAD Right 2021    Procedure: OSTECTOMY, METATARSAL BONE, diatal 1st;  Surgeon: Sonja Corral DPM;  Location: [facility] OR;  Service: Podiatry;  Laterality: Right;    TOE AMPUTATION Left 2021    Procedure: AMPUTATION, TOE;  Surgeon: HOMERO Hayden II, MD;  Location: [facility];  Service: Vascular;  Laterality: Left;    TOTAL REDUCTION MAMMOPLASTY Right        Social History     Tobacco Use    Smoking status: Current Some Day Smoker     Packs/day: 0.50     Years: 28.00     Pack years: 14.00     Types: Cigarettes     Start date: 1990     Last attempt to quit: 2018     Years since quittin.2    Smokeless tobacco: Never Used    Tobacco comment: anxious   Substance Use Topics    Alcohol use: No    Drug use: No       Family History    Problem Relation Age of Onset    Arthritis Mother     Heart disease Mother     Diabetes Father     Heart disease Father     Celiac disease Sister     Breast cancer Maternal Grandmother         possible-  patient unsure    Cancer Maternal Grandmother     Heart disease Maternal Grandmother     Cancer Maternal Aunt     Ovarian cancer Neg Hx     Glaucoma Neg Hx     Macular degeneration Neg Hx        Current Outpatient Medications   Medication Sig    acetaminophen-codeine 300-30mg (TYLENOL #3) 300-30 mg Tab Take 1 tablet by mouth every 8 (eight) hours as needed.    albuterol (PROVENTIL/VENTOLIN HFA) 90 mcg/actuation inhaler Inhale 2 puffs into the lungs every 6 (six) hours as needed for Wheezing. Rescue    amiodarone (PACERONE) 200 MG Tab Take 1 tablet (200 mg total) by mouth once daily.    apixaban (ELIQUIS) 2.5 mg Tab Take 1 tablet (2.5 mg total) by mouth 2 (two) times daily.    aspirin (ECOTRIN) 81 MG EC tablet Take 1 tablet (81 mg total) by mouth once daily. Take until 3/8/22, then stop unless further instructed by provider.    atorvastatin (LIPITOR) 40 MG tablet Take 1 tablet (40 mg total) by mouth once daily.    blood sugar diagnostic Strp 1 strip by Misc.(Non-Drug; Combo Route) route 4 (four) times daily.    blood-glucose sensor (DEXCOM G6 SENSOR) Umm 1 each by Misc.(Non-Drug; Combo Route) route every 10 days.    blood-glucose transmitter (DEXCOM G6 TRANSMITTER) Umm 1 Device by Misc.(Non-Drug; Combo Route) route continuous.    clopidogreL (PLAVIX) 75 mg tablet Take 1 tablet (75 mg total) by mouth once daily.    fish oil-omega-3 fatty acids 300-1,000 mg capsule Take 1 capsule by mouth every morning.    flash glucose scanning reader (FREESTYLE LULU 14 DAY READER) Misc Use as directed to check blood sugars    flash glucose scanning reader (FREESTYLE LULU 2 READER) Misc 1 Units by Misc.(Non-Drug; Combo Route) route continuous prn.    flash glucose sensor (FREESTYLE LULU 14 DAY SENSOR)  Kit Use as directed to check blood sugars    flash glucose sensor (FREESTYLE LULU 2 SENSOR) Kit 1 Units by Misc.(Non-Drug; Combo Route) route every 14 (fourteen) days.    insulin aspart U-100 (NOVOLOG) 100 unit/mL (3 mL) InPn pen Inject 3 Units into the skin 3 (three) times daily with meals.    insulin detemir U-100 (LEVEMIR FLEXTOUCH) 100 unit/mL (3 mL) SubQ InPn pen Inject 4 Units into the skin 2 (two) times daily.    lancets (ONETOUCH DELICA LANCETS) 33 gauge Misc 1 lancet by Misc.(Non-Drug; Combo Route) route 4 (four) times daily before meals and nightly.    methocarbamoL (ROBAXIN) 500 MG Tab Take 1 tablet (500 mg total) by mouth 3 (three) times daily.    methoxy peg-epoetin beta (MIRCERA INJ) 225 mcg.    metoprolol tartrate (LOPRESSOR) 25 MG tablet Take 1 tablet (25 mg total) by mouth 2 (two) times daily.    nitroGLYCERIN (NITROSTAT) 0.3 MG SL tablet Place 1 tablet (0.3 mg total) under the tongue every 5 (five) minutes as needed for Chest pain.    senna-docusate 8.6-50 mg (PERICOLACE) 8.6-50 mg per tablet Take 1 tablet by mouth 2 (two) times daily.    sevelamer carbonate (RENVELA) 800 mg Tab Take 2 tablets (1,600 mg total) by mouth 3 (three) times daily with meals.     No current facility-administered medications for this visit.       Review of patient's allergies indicates:   Allergen Reactions    Cyclobenzaprine Hallucinations    Erythromycin      Stomach upset    Keflex [cephalexin]      Yeast infection    Erythromycin base      Other reaction(s): upset stomach       Objective:   Vitals:  Vitals:    03/30/22 1352   BP: 109/60   Pulse: 69         Physical Exam  Constitutional: No distress, obese, conversant  HEENT: Sclera anicteric, PERRLA, EOMI  Neck:+ JVD, no masses, good movement  CV: RRR, S1 and S2 normal, no additional heart sounds or murmurs. Pulses 2+ and equal bilaterally in radial arteries, Jaime's normal on right. Distal pulses are 2+ and equal in the femoral, DP and PT areas  bilaterally  Pulm: + crackles  GI: Abdomen soft, non-tender, good bowel sounds  Extremities: Both extremities intact and grossly normal, skin is warm, + edema (pitting to mid tibia) + venous stasis changes  Skin: No ecchymosis, erythema, or ulcers  Psych: AOx3, appropriate affect  Neuro: CNII-XII intact, no focal deficits        Chemistry        Component Value Date/Time     03/07/2022 0511     (A) 07/07/2021 0000    K 5.0 03/07/2022 0511    K 3.8 07/07/2021 0000     03/07/2022 0511    CO2 20 (L) 03/07/2022 0511    BUN 49 (H) 03/07/2022 0511    BUN 30 (A) 07/07/2021 0000    CREATININE 7.4 (H) 03/07/2022 0511    CREATININE 4.74 (A) 07/07/2021 0000     (H) 03/07/2022 0511        Component Value Date/Time    CALCIUM 8.6 (L) 03/07/2022 0511    ALKPHOS 120 03/04/2022 0009    AST 33 03/04/2022 0009    ALT 21 03/04/2022 0009    ALT 20 07/07/2021 0000    BILITOT 1.0 03/04/2022 0009    ESTGFRAFRICA 6.0 (A) 03/07/2022 0511    EGFRNONAA 5.2 (A) 03/07/2022 0511            Lab Results   Component Value Date    CHOL 114 (L) 02/09/2021    CHOL 157 11/21/2019    CHOL 187 01/29/2018     Lab Results   Component Value Date    HDL 23 (L) 02/09/2021    HDL 34 (L) 11/21/2019    HDL 45 01/29/2018     Lab Results   Component Value Date    LDLCALC 33.6 (L) 02/09/2021    LDLCALC 72.2 11/21/2019    LDLCALC 107.6 01/29/2018     Lab Results   Component Value Date    TRIG 287 (H) 02/09/2021    TRIG 254 (H) 11/21/2019    TRIG 172 (H) 01/29/2018     Lab Results   Component Value Date    CHOLHDL 20.2 02/09/2021    CHOLHDL 21.7 11/21/2019    CHOLHDL 24.1 01/29/2018         Lab Results   Component Value Date     03/07/2022     (A) 07/07/2021    K 5.0 03/07/2022    K 3.8 07/07/2021     03/07/2022    CO2 20 (L) 03/07/2022    BUN 49 (H) 03/07/2022    BUN 30 (A) 07/07/2021    CREATININE 7.4 (H) 03/07/2022    CREATININE 4.74 (A) 07/07/2021     (H) 03/07/2022    HGBA1C 5.9 (H) 11/03/2021    HGBA1C 7.8  10/09/2018    MG 2.0 03/07/2022    AST 33 03/04/2022    ALT 21 03/04/2022    ALT 20 07/07/2021    ALBUMIN 3.0 (L) 03/04/2022    PROT 8.1 03/04/2022    BILITOT 1.0 03/04/2022    WBC 4.16 03/07/2022    WBC 7.2 07/07/2021    HGB 10.2 (L) 03/07/2022    HCT 33.9 (L) 03/07/2022    HCT 34 (L) 03/04/2022    MCV 94 03/07/2022     (L) 03/07/2022    INR 1.1 02/05/2022    TSH 2.540 10/30/2021    CHOL 114 (L) 02/09/2021    HDL 23 (L) 02/09/2021    LDLCALC 33.6 (L) 02/09/2021    TRIG 287 (H) 02/09/2021     Imaging  MRI 2019  IMPRESSION:   Mildly degraded examination related to patient motion artifact. No acute intracranial abnormality.  Remote pontine hemorrhage and remote microhemorrhages within the right thalamus and bilateral basal ganglia.  Sinus disease.  Findings of chronic microvascular ischemic disease.

## 2022-03-30 NOTE — SUBJECTIVE & OBJECTIVE
Past Medical History:   Diagnosis Date    Anxiety     Breast cancer     left    Carotid artery disease     Cataract     Choledocholithiasis     s/p ERCP and stent placement    Chronic diastolic CHF (congestive heart failure)     Chronic obstructive pulmonary disease     Chronic venous insufficiency     Depression     End-stage renal disease on hemodialysis     M/W/F    GERD (gastroesophageal reflux disease)     History of breast cancer     History of colon cancer 2001    s/p sigmoid colectomy    History of kidney stones     History of osteomyelitis of left foot     History of pancreatitis     History of stroke     Hyperlipidemia     Hypertension     Intracerebral hemorrhage     Lumbar degenerative disc disease     Lumbar spinal stenosis     Morbid obesity     Paroxysmal atrial fibrillation     Peripheral artery disease     Peripheral neuropathy     Tobacco dependence     Type II diabetes mellitus     Urinary incontinence        Past Surgical History:   Procedure Laterality Date    ANGIOGRAM, CORONARY, WITH LEFT HEART CATHETERIZATION N/A 2/7/2022    Procedure: Angiogram, Coronary, with Left Heart Cath;  Surgeon: Adam Rutherford MD;  Location: [facility] CATH LAB;  Service: Cardiology;  Laterality: N/A;    ANGIOGRAPHY OF LOWER EXTREMITY Right 9/17/2020    Procedure: Angiogram Extremity Unilateral;  Surgeon: RICK Pollard III, MD;  Location: [facility] OR [address] [place] [address];  Service: Peripheral Vascular;  Laterality: Right;  6.2 minutes of fluro  690.88 mGy  112.64 Gycm2  55 ml    ANGIOGRAPHY OF LOWER EXTREMITY Left 5/13/2021    Procedure: Angiogram Extremity Unilateral;  Surgeon: HOMERO Hayden II, MD;  Location: [facility] OR [address] [place] [address];  Service: Vascular;  Laterality: Left;  35.1 min  971.41 mGy  190.27 Gy.cm  101ml Dye    ANGIOGRAPHY OF LOWER EXTREMITY Right 7/19/2021    Procedure: Angiogram Extremity Unilateral;  Surgeon: HOMERO Hayden II, MD;  Location: [address];  Service: Vascular;  Laterality: Right;  3.0 min  121.00  mGy  26.8713 Gy.cm  13ml Dye    AORTOGRAPHY N/A 5/13/2021    Procedure: AORTOGRAM;  Surgeon: HOMERO Haydne II, MD;  [facility];  Service: Vascular;  Laterality: N/A;    AV FISTULA PLACEMENT Right 5/28/2018    Procedure: CREATION -FISTULA-AV;  Surgeon: RICK Pollard III, MD;  [facility];  Service: Peripheral Vascular;  Laterality: Right;    BREAST BIOPSY  1/2012    left breast invasive mammary carcinoma (lateral bx) and intraductal papilloma (medial bx)    CARDIOVERSION  11/22/2021    CARDIOVERSION  11/22/2021    Procedure: Cardioversion;  Surgeon: Ad Garcia MD;  Location: [facility] CATH LAB;  Service: Cardiology;;    CATARACT EXTRACTION W/  INTRAOCULAR LENS IMPLANT Right 1/24/2019        CATARACT EXTRACTION W/  INTRAOCULAR LENS IMPLANT Left 1/31/2019    Procedure: EXTRACTION, CATARACT, WITH IOL INSERTION;  Surgeon: Immanuel Moncada MD;  Location: [facility];  Service: Ophthalmology;  Laterality: Left;    CHOLECYSTECTOMY  2001    CORONARY ANGIOGRAPHY N/A 2/8/2022    Procedure: ANGIOGRAM, CORONARY ARTERY;  Surgeon: Abelardo Betancur MD;  Location: [facility] CATH LAB;  Service: Cardiology;  Laterality: N/A;    DEBRIDEMENT OF FOOT Right 2/17/2021    Procedure: DEBRIDEMENT, FOOT right plantar ulcer;  Surgeon: Sonja Corral DPM;  Location: [facility];  Service: Podiatry;  Laterality: Right;    ERCP W/ SPHICTEROTOMY      FINE NEEDLE ASPIRATION  1999    Right breast    FOOT AMPUTATION Left 6/1/2021    Procedure: Transmetatarsal amputation;  Surgeon: Billy Robles DPM;  [facility];  Service: Podiatry;  Laterality: Left;    FOOT AMPUTATION Left 7/23/2021    Procedure: AMPUTATION, FOOT;  Surgeon: Billy Robles DPM;  [facility];  Service: Podiatry;  Laterality: Left;    FOOT AMPUTATION THROUGH METATARSAL Right 10/15/2020    Procedure: PARTIAL RAY AMPUTATION GREAT TOE;  Surgeon: Billy Robles DPM;  [facility];  Service: Podiatry;  Laterality:  Right;  Stretcher OK    HERNIA REPAIR      LAPAROSCOPIC NEPHRECTOMY Left 2011    MASTECTOMY  2013    left    OOPHORECTOMY Left 2001    REVISION OF ARTERIOVENOUS FISTULA Right 8/15/2018    Procedure: REVISION, AV FISTULA;  Surgeon: RICK Pollard III, MD;  Location: [facility] OR [address] [place] [address];  Service: Peripheral Vascular;  Laterality: Right;    SIGMOIDECTOMY  2001    SURGICAL REMOVAL OF METATARSAL HEAD Right 2/17/2021    Procedure: OSTECTOMY, METATARSAL BONE, diatal 1st;  Surgeon: Sonja Corral DPM;  Location: [facility] OR;  Service: Podiatry;  Laterality: Right;    TOE AMPUTATION Left 5/13/2021    Procedure: AMPUTATION, TOE;  Surgeon: HOMERO Hayden II, MD;  [facility];  Service: Vascular;  Laterality: Left;    TOTAL REDUCTION MAMMOPLASTY Right 2013       Review of patient's allergies indicates:   Allergen Reactions    Cyclobenzaprine Hallucinations    Erythromycin      Stomach upset    Keflex [cephalexin]      Yeast infection    Erythromycin base      Other reaction(s): upset stomach       No current facility-administered medications on file prior to encounter.     Current Outpatient Medications on File Prior to Encounter   Medication Sig    acetaminophen-codeine 300-30mg (TYLENOL #3) 300-30 mg Tab Take 1 tablet by mouth every 8 (eight) hours as needed.    albuterol (PROVENTIL/VENTOLIN HFA) 90 mcg/actuation inhaler Inhale 2 puffs into the lungs every 6 (six) hours as needed for Wheezing. Rescue    amiodarone (PACERONE) 200 MG Tab Take 1 tablet (200 mg total) by mouth once daily.    apixaban (ELIQUIS) 2.5 mg Tab Take 1 tablet (2.5 mg total) by mouth 2 (two) times daily.    atorvastatin (LIPITOR) 40 MG tablet Take 1 tablet (40 mg total) by mouth once daily.    blood sugar diagnostic Strp 1 strip by Misc.(Non-Drug; Combo Route) route 4 (four) times daily.    blood-glucose sensor (DEXCOM G6 SENSOR) Umm 1 each by Misc.(Non-Drug; Combo Route) route every 10 days.    blood-glucose transmitter (DEXCOM G6 TRANSMITTER)  Umm 1 Device by Misc.(Non-Drug; Combo Route) route continuous.    clopidogreL (PLAVIX) 75 mg tablet Take 1 tablet (75 mg total) by mouth once daily.    fish oil-omega-3 fatty acids 300-1,000 mg capsule Take 1 capsule by mouth every morning.    flash glucose scanning reader (FREESTYLE LULU 14 DAY READER) Misc Use as directed to check blood sugars    flash glucose scanning reader (FREESTYLE LULU 2 READER) Misc 1 Units by Misc.(Non-Drug; Combo Route) route continuous prn.    flash glucose sensor (FREESTYLE LULU 14 DAY SENSOR) Kit Use as directed to check blood sugars    flash glucose sensor (FREESTYLE LULU 2 SENSOR) Kit 1 Units by Misc.(Non-Drug; Combo Route) route every 14 (fourteen) days.    insulin aspart U-100 (NOVOLOG) 100 unit/mL (3 mL) InPn pen Inject 3 Units into the skin 3 (three) times daily with meals.    insulin detemir U-100 (LEVEMIR FLEXTOUCH) 100 unit/mL (3 mL) SubQ InPn pen Inject 4 Units into the skin 2 (two) times daily.    lancets (ONETOUCH DELICA LANCETS) 33 gauge Misc 1 lancet by Misc.(Non-Drug; Combo Route) route 4 (four) times daily before meals and nightly.    methocarbamoL (ROBAXIN) 500 MG Tab Take 1 tablet (500 mg total) by mouth 3 (three) times daily.    methoxy peg-epoetin beta (MIRCERA INJ) 225 mcg.    metoprolol tartrate (LOPRESSOR) 25 MG tablet Take 1 tablet (25 mg total) by mouth 2 (two) times daily.    nitroGLYCERIN (NITROSTAT) 0.3 MG SL tablet Place 1 tablet (0.3 mg total) under the tongue every 5 (five) minutes as needed for Chest pain.    [DISCONTINUED] aspirin (ECOTRIN) 81 MG EC tablet Take 1 tablet (81 mg total) by mouth once daily. Take until 3/8/22, then stop unless further instructed by provider.    [DISCONTINUED] senna-docusate 8.6-50 mg (PERICOLACE) 8.6-50 mg per tablet Take 1 tablet by mouth 2 (two) times daily.    [DISCONTINUED] sevelamer carbonate (RENVELA) 800 mg Tab Take 2 tablets (1,600 mg total) by mouth 3 (three) times daily with meals.     Family History        Problem Relation (Age of Onset)    Arthritis Mother    Breast cancer Maternal Grandmother    Cancer Maternal Grandmother, Maternal Aunt    Celiac disease Sister    Diabetes Father    Heart disease Mother, Father, Maternal Grandmother          Tobacco Use    Smoking status: Current Some Day Smoker     Packs/day: 0.50     Years: 28.00     Pack years: 14.00     Types: Cigarettes     Start date: 1990     Last attempt to quit: 2018     Years since quittin.2    Smokeless tobacco: Never Used    Tobacco comment: anxious   Substance and Sexual Activity    Alcohol use: No    Drug use: No    Sexual activity: Not Currently     Partners: Male     Review of Systems   Constitutional: Negative for chills, decreased appetite and fever.   HENT:  Negative for congestion and sore throat.    Eyes:  Negative for blurred vision and discharge.   Cardiovascular:  Negative for chest pain, claudication, cyanosis, dyspnea on exertion and leg swelling.   Respiratory:  Positive for shortness of breath and wheezing. Negative for cough and hemoptysis.    Endocrine: Negative for cold intolerance and heat intolerance.   Skin:  Negative for color change.   Musculoskeletal:  Negative for muscle weakness and myalgias.   Gastrointestinal:  Negative for bloating and abdominal pain.   Neurological:  Negative for dizziness, focal weakness and weakness.   Psychiatric/Behavioral:  Negative for altered mental status and depression.    Objective:     Vital Signs (Most Recent):  Temp: 98.1 °F (36.7 °C) (22 1459)  Pulse: 64 (22 1809)  Resp: (!) 24 (22 1809)  BP: (!) 171/88 (22 1543)  SpO2: 96 % (22 180)   Vital Signs (24h Range):  Temp:  [98.1 °F (36.7 °C)] 98.1 °F (36.7 °C)  Pulse:  [62-69] 64  Resp:  [21-24] 24  SpO2:  [95 %-99 %] 96 %  BP: (109-171)/(60-88) 171/88     Weight: 104 kg (229 lb 4.5 oz)  Body mass index is 39.36 kg/m².    SpO2: 96 %  O2 Device (Oxygen Therapy): nasal cannula    No intake or output data in  the 24 hours ending 03/30/22 1853    Lines/Drains/Airways       Drain  Duration                  Hemodialysis AV Fistula 02/17/22 Right forearm 41 days              Peripheral Intravenous Line  Duration                  Peripheral IV - Single Lumen 03/30/22 1657 20 G Left Antecubital <1 day                    Physical Exam  Constitutional:       Appearance: She is well-developed.   HENT:      Head: Normocephalic and atraumatic.      Right Ear: External ear normal.      Left Ear: External ear normal.   Eyes:      Conjunctiva/sclera: Conjunctivae normal.   Cardiovascular:      Rate and Rhythm: Normal rate and regular rhythm.      Pulses: Intact distal pulses.           Radial pulses are 2+ on the right side and 2+ on the left side.      Heart sounds: Normal heart sounds.      Comments: RUE AVF  Pulmonary:      Effort: Pulmonary effort is normal. No respiratory distress.      Breath sounds: Wheezing (expiratory) present.   Abdominal:      General: Bowel sounds are normal. There is no distension.      Palpations: Abdomen is soft.      Tenderness: There is no abdominal tenderness.   Musculoskeletal:         General: Normal range of motion.      Cervical back: Normal range of motion and neck supple.      Right lower leg: Edema present.      Left lower leg: Edema present.   Skin:     General: Skin is warm and dry.   Neurological:      Mental Status: She is alert and oriented to person, place, and time.   Psychiatric:         Mood and Affect: Mood normal.         Behavior: Behavior normal.       Significant Labs: CMP   Recent Labs   Lab 03/30/22  1656      K 7.2*   CL 92*   CO2 25   *   BUN 70*   CREATININE 9.0*   CALCIUM 9.2   PROT 7.5   ALBUMIN 3.8   BILITOT 0.9   ALKPHOS 98   AST 36   ALT 20   ANIONGAP 19*   ESTGFRAFRICA 4.7*   EGFRNONAA 4.1*   , CBC   Recent Labs   Lab 03/30/22  1656   WBC 4.91   HGB 12.0   HCT 38.4      , INR No results for input(s): INR, PROTIME in the last 48 hours., and Troponin    Recent Labs   Lab 03/30/22  1656   TROPONINI 3.472*       Significant Imaging: Echocardiogram: 2D echo with color flow doppler:   Results for orders placed or performed during the hospital encounter of 04/19/18   2D echo with color flow doppler   Result Value Ref Range    EF + QEF 51 55 - 65    Diastolic Dysfunction No     Narrative    Date of Procedure: 04/20/2018        TEST DESCRIPTION   Technical Quality: This is a technically challenging study.     Aorta: The aortic root is normal in size, measuring 2.5 cm at sinotubular junction.     Left Atrium: The left atrial volume index is normal, measuring 25.32 cc/m2.     Left Ventricle: The left ventricle is normal in size, with an end-diastolic diameter of 4.7 cm, and an end-systolic diameter of 3.5 cm. Posterior wall thickness is mildly increased, with the septum measuring 1.1 cm and the posterior wall measuring 1.4 cm   across. Relative wall thickness was increased at 0.60, and the LV mass index was increased at 111.6 g/m2 consistent with concentric left ventricular hypertrophy. There are no regional wall motion abnormalities. Left ventricular systolic function appears   low normal to mildly depressed. Visually estimated ejection fraction is 50-55%. The LV Doppler derived stroke volume equals 79.0 ccs.     Diastolic indices: E/A ratio 1.1,  msec., Diastolic function is abnormal.     Right Atrium: The right atrium is normal in size, measuring 4.6 cm in length and 3.7 cm in width in the apical view.     Right Ventricle: The right ventricle is normal in size. Global right ventricular systolic function appears normal. Tricuspid annular plane systolic excursion (TAPSE) is 2.2 cm.     Aortic Valve:  The aortic valve is not well seen. The mean gradient obtained across the aortic valve is 5 mmHg. Using a left ventricular outflow tract diameter of 1.9 cm, a left ventricular outflow tract velocity time integral of 27 cm, and a peak   instantaneous transvalvular  velocity time integral of 35 cm, the calculated aortic valve area is 2.24 cm2(AVAi is 0.94 cm2/m2).     Mitral Valve:  The mitral valve is normal in structure. The pressure half time is 72 msec. The calculated mitral valve area is 3.06 cm2.     Tricuspid Valve:  The tricuspid valve is normal in structure.     Pulmonary Valve:  The pulmonic valve is not well seen.     IVC: The IVC is not visualized.     Intracavitary: There is no evidence of pericardial effusion, intracavity mass, thrombi, or vegetation.         CONCLUSIONS     1 - Concentric hypertrophy.     2 - Low normal to mildly depressed left ventricular systolic function (EF 50-55%).     3 - Left ventricular diastolic dysfunction.     4 - Normal right ventricular systolic function .     5 - Technically difficult study.           This document has been electronically    SIGNED BY: Min Reis MD On: 04/20/2018 18:27    This document was originally electronically signed on: 04/20/2018 18:16    and Transthoracic echo (TTE) complete (Cupid Only):   Results for orders placed or performed during the hospital encounter of 02/04/22   Echo   Result Value Ref Range    Ascending aorta 2.66 cm    STJ 2.53 cm    AV mean gradient 6 mmHg    Ao peak colten 0.71 m/s    Ao VTI 32.88 cm    IVRT 74.22 msec    IVS 0.98 0.6 - 1.1 cm    LA size 3.90 cm    Left Atrium Major Axis 6.76 cm    Left Atrium Minor Axis 6.43 cm    LVIDd 5.20 3.5 - 6.0 cm    LVIDs 3.60 2.1 - 4.0 cm    LVOT diameter 1.98 cm    LVOT peak VTI 18.97 cm    Posterior Wall 0.70 0.6 - 1.1 cm    MV Peak A Colten 0.84 m/s    E wave deceleration time 124.98 msec    MV Peak E Colten 1.07 m/s    RA Major Axis 4.83 cm    RA Width 4.22 cm    RVDD 3.94 cm    Sinus 2.92 cm    TAPSE 1.68 cm    TR Max Colten 1.68 m/s    TDI LATERAL 0.04 m/s    TDI SEPTAL 0.04 m/s    LA WIDTH 4.84 cm    MV stenosis pressure 1/2 time 36.24 ms    LV Diastolic Volume 83.47 mL    LV Systolic Volume 43.31 mL    RV S' 11.08 cm/s    LVOT peak colten 0.87 m/s     LA volume (mod) 73.33 cm3    LV LATERAL E/E' RATIO 26.75 m/s    LV SEPTAL E/E' RATIO 26.75 m/s    FS 31 %    LA volume 105.75 cm3    LV mass 154.56 g    Left Ventricle Relative Wall Thickness 0.27 cm    AV valve area 1.78 cm2    AV Velocity Ratio 1.23     AV index (prosthetic) 0.58     MV valve area p 1/2 method 6.07 cm2    E/A ratio 1.27     Mean e' 0.04 m/s    LVOT area 3.1 cm2    LVOT stroke volume 58.38 cm3    AV peak gradient 2 mmHg    E/E' ratio 26.75 m/s    LV Systolic Volume Index 21.0 mL/m2    LV Diastolic Volume Index 40.52 mL/m2    LA Volume Index 51.3 mL/m2    LV Mass Index 75 g/m2    Triscuspid Valve Regurgitation Peak Gradient 11 mmHg    LA Volume Index (Mod) 35.6 mL/m2    BSA 2.15 m2    Right Atrial Pressure (from IVC) 8 mmHg    EF 40 %    TV rest pulmonary artery pressure 19 mmHg    Narrative    · Severe left atrial enlargement.  · The left ventricle is normal in size with mildly decreased systolic   function.  · The estimated ejection fraction is 40%.  · There are segmental left ventricular wall motion abnormalities: apex,   apical septum, mid and apical anterolateral, apical inferior and apical   anterior wall.  · Grade II left ventricular diastolic dysfunction.  · Normal right ventricular size with normal right ventricular systolic   function.  · There is mild aortic valve stenosis.  · Aortic valve area is 1.78 cm2; peak velocity is 0.71 m/s; mean gradient   is 6 mmHg.  · Intermediate central venous pressure (8 mmHg).  · The estimated PA systolic pressure is 19 mmHg.

## 2022-03-30 NOTE — Clinical Note
The groin was prepped. The site was prepped with ChloraPrep. The patient was draped. The patient was positioned supine.

## 2022-03-30 NOTE — ED PROVIDER NOTES
"Encounter Date: 3/30/2022       History     Chief Complaint   Patient presents with    Shortness of Breath     Sent from cards for admission, last dialysis yest, diarrhea, had stent placed 3 weeks ago     The history is provided by the patient and medical records. No  was used.      Kylie King is a 66 y.o. female with medical history of ESRD on HD, HFpEF, HTN, CAD with stent on Plavix, Parox AFib on Eliquis, CRF on home O2, PVD s/p L foot amp, Hx of osteomyelitis, HLD, Lumbar Stenosis presenting to the ED from Cardiology clinic for evaluation of shortness of breath.     Patient reports only undergoing 1.5 hours of 4 hour HD session yesterday as she started to have 1 episode of non-bloody diarrhea. Reports she weighed 104 kg after HD session and her dry weight is 98 kg. Reports acute on chronic SOB when she awoke this morning despite compliance with 2L NC. She had a routine follow-up with Cardiology today who advised her to come to the ED for likely admission 2/2 hypervolemia. No diarrhea today. Denies fever, headache, chest pain, cough, abdominal pain, vomiting, melena, hematochezia. Additionally reports falling to the ground while getting out of her car after arrival to the hospital. Reports having both of her legs "give out" causing her to fall down onto her bottom. No head trauma or LOC. Uses a walker at her baseline and was not using the walker at the time of the fall.     Review of patient's allergies indicates:   Allergen Reactions    Cyclobenzaprine Hallucinations    Erythromycin      Stomach upset    Keflex [cephalexin]      Yeast infection    Erythromycin base      Other reaction(s): upset stomach     Past Medical History:   Diagnosis Date    Anxiety     Breast cancer     left    Carotid artery disease     Cataract     Choledocholithiasis     s/p ERCP and stent placement    Chronic diastolic CHF (congestive heart failure)     Chronic obstructive pulmonary disease     " Chronic venous insufficiency     Depression     End-stage renal disease on hemodialysis     M/W/F    GERD (gastroesophageal reflux disease)     History of breast cancer     History of colon cancer 2001    s/p sigmoid colectomy    History of kidney stones     History of osteomyelitis of left foot     History of pancreatitis     History of stroke     Hyperlipidemia     Hypertension     Intracerebral hemorrhage     Lumbar degenerative disc disease     Lumbar spinal stenosis     Morbid obesity     Paroxysmal atrial fibrillation     Peripheral artery disease     Peripheral neuropathy     Tobacco dependence     Type II diabetes mellitus     Urinary incontinence      Past Surgical History:   Procedure Laterality Date    ANGIOGRAM, CORONARY, WITH LEFT HEART CATHETERIZATION N/A 2/7/2022    Procedure: Angiogram, Coronary, with Left Heart Cath;  Surgeon: Adam Rutherford MD;  Location: [facility] CATH LAB;  Service: Cardiology;  Laterality: N/A;    ANGIOGRAPHY OF LOWER EXTREMITY Right 9/17/2020    Procedure: Angiogram Extremity Unilateral;  Surgeon: RICK Pollard III, MD;  Location: [facility] OR [address] [place] [address];  Service: Peripheral Vascular;  Laterality: Right;  6.2 minutes of fluro  690.88 mGy  112.64 Gycm2  55 ml    ANGIOGRAPHY OF LOWER EXTREMITY Left 5/13/2021    Procedure: Angiogram Extremity Unilateral;  Surgeon: HOMERO Hayden II, MD;  Location: [address];  Service: Vascular;  Laterality: Left;  35.1 min  971.41 mGy  190.27 Gy.cm  101ml Dye    ANGIOGRAPHY OF LOWER EXTREMITY Right 7/19/2021    Procedure: Angiogram Extremity Unilateral;  Surgeon: HOMERO Hayden II, MD;  Location: [address];  Service: Vascular;  Laterality: Right;  3.0 min  121.00 mGy  26.8713 Gy.cm  13ml Dye    AORTOGRAPHY N/A 5/13/2021    Procedure: AORTOGRAM;  Surgeon: HOMERO Hayden II, MD;  Location: [facility] OR [address] [place] [address];  Service: Vascular;  Laterality: N/A;    AV FISTULA PLACEMENT Right 5/28/2018    Procedure: CREATION  -FISTULA-AV;  Surgeon: RICK Pollard III, MD;  Location: [address];  Service: Peripheral Vascular;  Laterality: Right;    BREAST BIOPSY  1/2012    left breast invasive mammary carcinoma (lateral bx) and intraductal papilloma (medial bx)    CARDIOVERSION  11/22/2021    CARDIOVERSION  11/22/2021    Procedure: Cardioversion;  Surgeon: Ad Garcia MD;  Location: [facility] CATH LAB;  Service: Cardiology;;    CATARACT EXTRACTION W/  INTRAOCULAR LENS IMPLANT Right 1/24/2019        CATARACT EXTRACTION W/  INTRAOCULAR LENS IMPLANT Left 1/31/2019    Procedure: EXTRACTION, CATARACT, WITH IOL INSERTION;  Surgeon: Immanuel Moncada MD;  Location: [facility] OR;  Service: Ophthalmology;  Laterality: Left;    CHOLECYSTECTOMY  2001    CORONARY ANGIOGRAPHY N/A 2/8/2022    Procedure: ANGIOGRAM, CORONARY ARTERY;  Surgeon: Abelardo Betancur MD;  Location: [facility] CATH LAB;  Service: Cardiology;  Laterality: N/A;    DEBRIDEMENT OF FOOT Right 2/17/2021    Procedure: DEBRIDEMENT, FOOT right plantar ulcer;  Surgeon: Sonja Corral DPM;  Location: [facility] OR;  Service: Podiatry;  Laterality: Right;    ERCP W/ SPHICTEROTOMY      FINE NEEDLE ASPIRATION  1999    Right breast    FOOT AMPUTATION Left 6/1/2021    Procedure: Transmetatarsal amputation;  Surgeon: Billy Robles DPM;  [facility];  Service: Podiatry;  Laterality: Left;    FOOT AMPUTATION Left 7/23/2021    Procedure: AMPUTATION, FOOT;  Surgeon: Billy Robles DPM;  [facility];  Service: Podiatry;  Laterality: Left;    FOOT AMPUTATION THROUGH METATARSAL Right 10/15/2020    Procedure: PARTIAL RAY AMPUTATION GREAT TOE;  Surgeon: Billy Robles DPM;  [facility];  Service: Podiatry;  Laterality: Right;  Stretcher OK    HERNIA REPAIR      LAPAROSCOPIC NEPHRECTOMY Left 2011    MASTECTOMY  2013    left    OOPHORECTOMY Left 2001    REVISION OF ARTERIOVENOUS FISTULA Right 8/15/2018    Procedure: REVISION, AV FISTULA;  Surgeon: RICK  ROSINA Pollard III, MD;  Location: [facility] OR 2ND FLR;  Service: Peripheral Vascular;  Laterality: Right;    SIGMOIDECTOMY      SURGICAL REMOVAL OF METATARSAL HEAD Right 2021    Procedure: OSTECTOMY, METATARSAL BONE, diatal 1st;  Surgeon: Sonja Corral DPM;  Location: [facility] OR;  Service: Podiatry;  Laterality: Right;    TOE AMPUTATION Left 2021    Procedure: AMPUTATION, TOE;  Surgeon: HOMERO Hayden II, MD;  Location: [facility] OR 2ND FLR;  Service: Vascular;  Laterality: Left;    TOTAL REDUCTION MAMMOPLASTY Right      Family History   Problem Relation Age of Onset    Arthritis Mother     Heart disease Mother     Diabetes Father     Heart disease Father     Celiac disease Sister     Breast cancer Maternal Grandmother         possible-  patient unsure    Cancer Maternal Grandmother     Heart disease Maternal Grandmother     Cancer Maternal Aunt     Ovarian cancer Neg Hx     Glaucoma Neg Hx     Macular degeneration Neg Hx      Social History     Tobacco Use    Smoking status: Current Some Day Smoker     Packs/day: 0.50     Years: 28.00     Pack years: 14.00     Types: Cigarettes     Start date: 1990     Last attempt to quit: 2018     Years since quittin.2    Smokeless tobacco: Never Used    Tobacco comment: anxious   Substance Use Topics    Alcohol use: No    Drug use: No     Review of Systems   Constitutional: Negative for fever.   HENT: Negative for sore throat.    Eyes: Negative for pain.   Respiratory: Positive for shortness of breath.    Cardiovascular: Negative for chest pain.   Gastrointestinal: Positive for diarrhea (resolved). Negative for nausea.   Genitourinary: Negative for dysuria.   Musculoskeletal: Positive for back pain.   Skin: Negative for rash.   Neurological: Negative for weakness.   Hematological: Does not bruise/bleed easily.       Physical Exam     Initial Vitals [22 1459]   BP Pulse Resp Temp SpO2   (!) 144/78 63 (!) 24 98.1 °F (36.7 °C) 96 %       MAP       --         Physical Exam    Constitutional: She appears well-developed and well-nourished. She is not diaphoretic. No distress.   HENT:   Head: Normocephalic and atraumatic.   Mouth/Throat: Oropharynx is clear and moist. No oropharyngeal exudate.   Eyes: Conjunctivae and EOM are normal. Pupils are equal, round, and reactive to light. No scleral icterus.   Neck: Neck supple.   Normal range of motion.  Cardiovascular: Normal rate and regular rhythm.   R forearm AVF +thrill  No JVD  Trace non-pitting edema BLE   Pulmonary/Chest: Breath sounds normal. No respiratory distress. She has no wheezes.   2L NC. Slight difficulty speaking in full sentences   Abdominal: Abdomen is soft. She exhibits no distension. There is no abdominal tenderness. There is no rebound.   Musculoskeletal:         General: No tenderness or edema. Normal range of motion.      Cervical back: Normal range of motion and neck supple.      Comments: Partial amputation L foot     Neurological: She is alert and oriented to person, place, and time. She has normal strength. No sensory deficit.   Skin: Skin is warm and dry. No rash noted. No erythema.   Psychiatric: She has a normal mood and affect.       ED Course   Procedures  Labs Reviewed   CBC W/ AUTO DIFFERENTIAL - Abnormal; Notable for the following components:       Result Value    MCHC 31.3 (*)     RDW 19.5 (*)     Lymph # 0.7 (*)     Gran % 78.4 (*)     Lymph % 14.7 (*)     All other components within normal limits   COMPREHENSIVE METABOLIC PANEL - Abnormal; Notable for the following components:    Potassium 7.2 (*)     Chloride 92 (*)     Glucose 130 (*)     BUN 70 (*)     Creatinine 9.0 (*)     Anion Gap 19 (*)     eGFR if  4.7 (*)     eGFR if non  4.1 (*)     All other components within normal limits   B-TYPE NATRIURETIC PEPTIDE - Abnormal; Notable for the following components:    BNP 4,554 (*)     All other components within normal limits   TROPONIN I  - Abnormal; Notable for the following components:    Troponin I 3.472 (*)     All other components within normal limits   PHOSPHORUS - Abnormal; Notable for the following components:    Phosphorus 9.1 (*)     All other components within normal limits   MAGNESIUM   APTT   PROTIME-INR   HEPATITIS C ANTIBODY   TROPONIN I   PROTIME-INR   APTT   SARS-COV-2 RDRP GENE    Narrative:     This test utilizes isothermal nucleic acid amplification   technology to detect the SARS-CoV-2 RdRp nucleic acid segment.   The analytical sensitivity (limit of detection) is 125 genome   equivalents/mL.   A POSITIVE result implies infection with the SARS-CoV-2 virus;   the patient is presumed to be contagious.     A NEGATIVE result means that SARS-CoV-2 nucleic acids are not   present above the limit of detection. A NEGATIVE result should be   treated as presumptive. It does not rule out the possibility of   COVID-19 and should not be the sole basis for treatment decisions.   If COVID-19 is strongly suspected based on clinical and exposure   history, re-testing using an alternate molecular assay should be   considered.   This test is only for use under the Food and Drug   Administration s Emergency Use Authorization (EUA).   Commercial kits are provided by [employer].   Performance characteristics of the EUA have been independently   verified by [facility] Department of   Pathology and Laboratory Medicine.   _________________________________________________________________   The authorized Fact Sheet for Healthcare Providers and the authorized Fact   Sheet for Patients of the ID NOW COVID-19 are available on the FDA   website:     https://www.fda.gov/media/395112/download  https://www.fda.gov/media/242630/download                ECG Results          EKG 12-lead (Final result)  Result time 03/30/22 16:04:29    Final result by Interface, Lab In [facility] (03/30/22 16:04:29)                 Narrative:    Test Reason :  R06.02,    Vent. Rate : 067 BPM     Atrial Rate : 067 BPM     P-R Int : 272 ms          QRS Dur : 122 ms      QT Int : 492 ms       P-R-T Axes : 017 -06 041 degrees     QTc Int : 519 ms    Sinus rhythm with 1st degree A-V block  Left bundle branch block  Abnormal ECG  When compared with ECG of 04-MAR-2022 01:13,  Sinus rhythm has replaced Atrial fibrillation  QRS duration has decreased    T wave inversion no longer evident in Inferior leads  Confirmed by GENI WALTERS MD ([id number]) on 3/30/2022 4:04:24 PM    Referred By: STEPHANIE   SELF           Confirmed By:GENI WALTERS MD                            Imaging Results          X-Ray Chest AP Portable (Final result)  Result time 03/30/22 18:11:35    Final result by Duyen Lenz MD (03/30/22 18:11:35)                 Impression:      Diffuse increased interstitial attenuation suggestive for pulmonary edema.  Atypical pneumonia could present with similar appearance in the right clinical setting.      Electronically signed by: Duyen Lenz MD  Date:    03/30/2022  Time:    18:11             Narrative:    EXAMINATION:  XR CHEST AP PORTABLE    TECHNIQUE:  Single frontal view of the chest was performed.    COMPARISON:  03/04/2022.    FINDINGS:  Cardiac silhouette is enlarged but stable in size.  Lungs are symmetrically expanded.  Diffuse increased interstitial attenuation is seen.  No evidence of new focal consolidation, pneumothorax, or large pleural effusion.  No acute osseous abnormality identified.                                 Medications   0.9%  NaCl infusion (has no administration in time range)   sodium chloride 0.9% bolus 250 mL (has no administration in time range)   melatonin tablet 6 mg (has no administration in time range)   polyethylene glycol packet 17 g (has no administration in time range)   acetaminophen tablet 650 mg (has no administration in time range)   naloxone 0.4 mg/mL injection 0.02 mg (has no administration in time range)   glucose  chewable tablet 16 g (has no administration in time range)   glucose chewable tablet 24 g (has no administration in time range)   dextrose 10% bolus 125 mL (has no administration in time range)   dextrose 10% bolus 250 mL (has no administration in time range)   glucagon (human recombinant) injection 1 mg (has no administration in time range)   heparin 25,000 units in dextrose 5% (100 units/ml) IV bolus from bag INITIAL BOLUS (max bolus 4000 units) (has no administration in time range)   heparin 25,000 units in dextrose 5% 250 mL (100 units/mL) infusion LOW INTENSITY nomogram - [facility] (has no administration in time range)   heparin 25,000 units in dextrose 5% (100 units/ml) IV bolus from bag - ADDITIONAL PRN BOLUS - 60 units/kg (max bolus 4000 units) (has no administration in time range)   heparin 25,000 units in dextrose 5% (100 units/ml) IV bolus from bag - ADDITIONAL PRN BOLUS - 30 units/kg (max bolus 4000 units) (has no administration in time range)   aspirin chewable tablet 324 mg (has no administration in time range)   clopidogreL tablet 300 mg (has no administration in time range)   atorvastatin tablet 80 mg (has no administration in time range)   aspirin chewable tablet 81 mg (has no administration in time range)   albuterol inhaler 2 puff (has no administration in time range)   amiodarone tablet 200 mg (has no administration in time range)   insulin detemir U-100 pen 2 Units (has no administration in time range)   albuterol sulfate nebulizer solution 10 mg (10 mg Nebulization Given 3/30/22 1809)     Medical Decision Making:   History:   Old Medical Records: I decided to obtain old medical records.  Old Records Summarized: records from clinic visits and records from previous admission(s).  Independently Interpreted Test(s):   I have ordered and independently interpreted EKG Reading(s) - see summary below       <> Summary of EKG Reading(s): Sinus rhythm 67 bpm with 1st degree AVB. LBBB similar to previous. TWI in  V4-6 seen on prior ECG. No STEMI  Clinical Tests:   Lab Tests: Ordered and Reviewed  Radiological Study: Ordered and Reviewed  Medical Tests: Ordered and Reviewed  Other:   I have discussed this case with another health care provider.       <> Summary of the Discussion: Hospital Medicine, Cardiology       APC / Resident Notes:   66 y.o. female with medical history of ESRD on HM, HFpEF, HTN, CAD with stent on Plavix, Parox AFib on Eliquis, CRF on home O2, PVD s/p L foot amp, Hx of osteomyelitis, HLD, Lumbar stenosis presenting to the ED from Cardiology clinic for evaluation of shortness of breath. HD session was interrupted yesterday 2/2 diarrhea episode and was 6kg heavier than her dry weight after the session. Seen by Cardiology today for routine follow-up and referred to the ED for hypervolemia. Additionally reports falling to the ground onto her bottom while getting out of the car today. She was not using her walker at the time. No head trauma or LOC.    DDx includes but not limited to hypervolemia, electrolyte disturbance, cardiac arrhythmia, CHF exacerbation, ACS, PNA. Reports acute on chronic lower back pain after fall this morning. No head trauma or LOC and do not feel CT head warranted at this time.         ED Course as of 03/30/22 2005   Wed Mar 30, 2022   1652 Patient declined lumbar x-ray and states she wants to do it outpatient [BA]   1809 Cardiology consulted regarding elevated troponin. They will come see the patient [BA]   1810 Potassium(!!): 7.2 [BA]   1810 Troponin I(!) [BA]   1810 Phosphorus(!!): 9.1 [BA]   1810 BNP(!): 4,554 [BA]   1810 Potassium 7.2. Discussed with Nephrology, will plan for HD in the ED in 1 hour. Will give calcium gluconate in the ED. No peaked T-waves or widened QRS on ECG. [BA]   1956 Cardiology recommending ACS protocol for NSTEMI. Heparin gtt ordered. Patient currently receiving HD at bedside. Discussed with HM, admitted for ongoing management. Patient expresses  understanding and agreeable to the plan. I have discussed the care of this patient with my supervising physician.  [BA]      ED Course User Index  [BA] Rajiv Silver PA-C             Clinical Impression:   Final diagnoses:  [R06.02] SOB (shortness of breath)  [N18.6, Z99.2] ESRD on hemodialysis  [I21.4] NSTEMI (non-ST elevated myocardial infarction)  [E87.5] Hyperkalemia (Primary)  [J96.11] Chronic respiratory failure with hypoxia          ED Disposition Condition    Admit               Rajiv Silver PA-C  03/30/22 2005

## 2022-03-30 NOTE — ED NOTES
LOC: The patient is awake, alert and aware of environment with an appropriate affect, the patient is oriented x 3 and speaking appropriately.  APPEARANCE: Patient resting comfortably and in no acute distress, patient is clean and well groomed, patient's clothing is properly fastened.  SKIN: The skin is warm and dry, color consistent with ethnicity  MUSCULOSKELETAL: Patient moving all extremities spontaneously, no obvious swelling or deformities noted.  RESPIRATORY: Airway is open and patent, respirations are spontaneous, mildly labored, patient placed on 2L o2 via NC, which patient wears at home.    ABDOMEN: Soft and non tender to palpation, no distention noted    Patient arrives from home with the complaint of sob, patient states she suddenly woke up at 430 this am with sob, she states she took a few breathing treatments and was able to resolve the shortness of breath but after getting up and exerting self patient states the sob returned. Patient states she was only able to receive half of her dialysis treatment yesterday due to her having diarrhea. Patient placed on cardiac monitoring, no acute distress noted, will continue to monitor

## 2022-03-30 NOTE — PROGRESS NOTES
Requested updates within Care Everywhere.  Patient's chart was reviewed for overdue BORA topics.  Health maintenance:updated  Immunizations:reconciled   Legacy:   Media:reviewed for outside colonoscopy and eye exam.  Orders placed:  Tasked appts:  Labs Linked:  Upcoming appt:

## 2022-03-31 PROBLEM — N18.6 ANEMIA IN ESRD (END-STAGE RENAL DISEASE): Chronic | Status: ACTIVE | Noted: 2021-01-01

## 2022-03-31 PROBLEM — J96.20 ACUTE ON CHRONIC RESPIRATORY FAILURE: Status: RESOLVED | Noted: 2021-01-29 | Resolved: 2022-01-01

## 2022-03-31 PROBLEM — I50.43 ACUTE ON CHRONIC COMBINED SYSTOLIC AND DIASTOLIC HEART FAILURE: Status: ACTIVE | Noted: 2018-04-23

## 2022-03-31 PROBLEM — D63.1 ANEMIA IN ESRD (END-STAGE RENAL DISEASE): Chronic | Status: ACTIVE | Noted: 2021-01-01

## 2022-03-31 PROBLEM — Z85.038 HISTORY OF COLON CANCER: Chronic | Status: ACTIVE | Noted: 2018-06-08

## 2022-03-31 PROBLEM — I50.42 CHRONIC COMBINED SYSTOLIC AND DIASTOLIC CONGESTIVE HEART FAILURE: Chronic | Status: ACTIVE | Noted: 2022-01-01

## 2022-03-31 PROBLEM — I50.42 CHRONIC COMBINED SYSTOLIC AND DIASTOLIC CONGESTIVE HEART FAILURE: Status: ACTIVE | Noted: 2022-01-01

## 2022-03-31 PROBLEM — J44.9 COPD WITHOUT EXACERBATION: Chronic | Status: ACTIVE | Noted: 2022-01-01

## 2022-03-31 PROBLEM — N18.6 ESRD (END STAGE RENAL DISEASE): Status: ACTIVE | Noted: 2022-01-01

## 2022-03-31 PROBLEM — E87.5 HYPERKALEMIA: Status: RESOLVED | Noted: 2021-02-15 | Resolved: 2022-01-01

## 2022-03-31 NOTE — HPI
Kylie King is a 66 y.o. female who  has a past medical history of Breast cancer (s/p rad/chemo), History of Colon cancer, CAD s/p PCI on 02/08/2022 (on DAPT), Cataract, Choledocholithiasis, HFrEF (40%), COPD, Chronic venous insufficiency, Anxiety w Depression, ESRD (TThS), GERD, History of kidney stones, History of osteomyelitis of left foot, History of pancreatitis, History of stroke, Hyperlipidemia, Hypertension, Intracerebral hemorrhage, Lumbar degenerative disc disease, Lumbar spinal stenosis, Morbid obesity, Paroxysmal atrial fibrillation, Cerebral Vascular Hemorrhage, PAD (s/p left foot amputation on Plavix), Peripheral neuropathy, Pubic ramus fracture, Tobacco dependence, DM2, and Urinary incontinence, presented to the ED with after presenting to Cardiac clinica and noticed to be volume overloaded on exam and SOBOE. Patient only received 1.5 hours of dialysis this past run. On admit her K was 7.4 and and BNP >4500, therefore, urgent HD performed in ED. In addition her initial troponin was 3.5, with repeat nearly 5. Denies any CP throughout, but diabetic. ACS protocol was initiated. Cardiology was consulted. Of note, she is anuric and very sensitive to missed or shortened dialysis sessions and has had several recent hospitalizations from volume/ electrolytes issues. In regards to her ongoing diarrhea, unclear cause as she denies recent ABx use and recent C. Diff testing negative. Had recent C 2/8/22: V-stenting of the LAD and circumflex ostium was accomplished. Patient denies any fevers, night sweats, n/v/d/c, abd pain, dysuria, hematuria or hematochezia, cough, wheezing, SOB, CP, leg swelling, HA, dizziness, weakness, hallucinations, SI, HI, or self injury at this time.      Nephrology was consulted for ESRD management

## 2022-03-31 NOTE — ASSESSMENT & PLAN NOTE
S/p mastectomy + XRT  No previous chemo  -Clarify if still on letrozole     Ambulette/Wheelchair/Stroller

## 2022-03-31 NOTE — CONSULTS
[facility] - Emergency Dept  Cardiology  Consult Note    Patient Name: Kylie King  MRN: [id number]  Admission Date: 3/30/2022  Hospital Length of Stay: 0 days  Code Status: Prior   Attending Provider: Cristina Benitez, *   Consulting Provider: Beto Beck MD  Primary Care Physician: Virginia Foote MD  Principal Problem:NSTEMI (non-ST elevated myocardial infarction)    Patient information was obtained from patient and ER records.     Inpatient consult to Cardiology  Consult performed by: Beto Beck MD  Consult ordered by: Rajiv Silver PA-C        Subjective:     Chief Complaint:  sob     HPI:    66 y.o. year old female with PMHx significant for CAD s/p PCI on 02/08/2022 (on DAPT), left breast cancer s/p mastectomy, chronic respiratory failure (on home 2L of O2), COPD, ESRD on HD TTS w/ anemia of chronic disease, Atrial Fib (failed DCCV in Nov 2021); on eliquis and amiodarone, CVA, PVD (s/p left foot amputation on Plavix), HTN, T2DM with history of osteomyelitis, HLD was seen in general Cardiology clinic. She was then sent to ED to be admitted to observation for shortness of breath 2/2 volume overload.      She had a recent hospital admission on 2/4/22 with a cc of dyspnea admitted with NSTEMI s/p PCI to   ostial Lcx ([id number] stent X2 . She was discharged on triple therapy for 6 mos ( end date 8/22) . In addition, she has had history of hyperkalemia ( up to 8.4) with another recent admission requiring shifting (3/7/22). Most recent lab work showing k 5.0. Of note, prior notes with history of medication non adherence. Today in clinic she had a fall when transitiong from sitting to standing and cut her last HD session short yesterday only going for 1.5 hours (due to diarrhea) when she usually goes for 5 hours. Her reported dry weight is 99kgs and today also reports worsening orthopnea and was wokened up from bed with SOB at 4 am. Of note, she is anuric and very sensitive to missed or shortened  "dialysis sessions and has had several recent hospitalizations from volume/ electrolytes issues. In regards to her ongoing diarrhea, unclear cause as she denies recent ABx use and recent C. Diff testing negative. Denies taking laxatives even though listed in med rec.    In the ER, angela found to be hypertensive 171/88 but otherwise hemodynamically stable. ECG showing non-specific st-t wave changes. Labs notable for troponin of 3.5, bnp of 4550, potassium of 7, potassium of 9.      [facility] 2/8/22  1. Successful treatment of a heavily calcified ostial left circumflex stenosis which cause admission for unstable angina.  2. "V"stenting of the LAD and circumflex ostium was accomplished.  High-pressure balloon inflations were used.    Summary:  1. Successful treatment of critical ostial left circumflex disease by doing V stenting of the ostial left main and left circumflex.  Distal diagonal lesion left untreated.  2. End-stage renal disease on hemodialysis  3. Chronic will send area was unstable angina and non-STEMI.  Recommendations:  1. Dual antiplatelet therapy for 6-12 months  2. Postop care  3. follow-up in General Cardiology and with her PCP and dialysis physicians.  TTE 2/5/22  ·           Severe left atrial enlargement.  ·           The left ventricle is normal in size with mildly decreased systolic function.  ·           The estimated ejection fraction is 40%.  ·           There are segmental left ventricular wall motion abnormalities: apex, apical septum, mid and apical anterolateral, apical inferior and apical anterior wall.  ·           Grade II left ventricular diastolic dysfunction.  ·           Normal right ventricular size with normal right ventricular systolic function.  ·           There is mild aortic valve stenosis.  ·           Aortic valve area is 1.78 cm2; peak velocity is 0.71 m/s; mean gradient is 6 mmHg.  ·           Intermediate central venous pressure (8 mmHg).  ·           The estimated PA " systolic pressure is 19 mmHg.         Past Medical History:   Diagnosis Date    Anxiety     Breast cancer     left    Carotid artery disease     Cataract     Choledocholithiasis     s/p ERCP and stent placement    Chronic diastolic CHF (congestive heart failure)     Chronic obstructive pulmonary disease     Chronic venous insufficiency     Depression     End-stage renal disease on hemodialysis     M/W/F    GERD (gastroesophageal reflux disease)     History of breast cancer     History of colon cancer 2001    s/p sigmoid colectomy    History of kidney stones     History of osteomyelitis of left foot     History of pancreatitis     History of stroke     Hyperlipidemia     Hypertension     Intracerebral hemorrhage     Lumbar degenerative disc disease     Lumbar spinal stenosis     Morbid obesity     Paroxysmal atrial fibrillation     Peripheral artery disease     Peripheral neuropathy     Tobacco dependence     Type II diabetes mellitus     Urinary incontinence        Past Surgical History:   Procedure Laterality Date    ANGIOGRAM, CORONARY, WITH LEFT HEART CATHETERIZATION N/A 2/7/2022    Procedure: Angiogram, Coronary, with Left Heart Cath;  Surgeon: Adam Rutherford MD;  Location: [facility] CATH LAB;  Service: Cardiology;  Laterality: N/A;    ANGIOGRAPHY OF LOWER EXTREMITY Right 9/17/2020    Procedure: Angiogram Extremity Unilateral;  Surgeon: RICK Pollard III, MD;  Location: [address];  Service: Peripheral Vascular;  Laterality: Right;  6.2 minutes of fluro  690.88 mGy  112.64 Gycm2  55 ml    ANGIOGRAPHY OF LOWER EXTREMITY Left 5/13/2021    Procedure: Angiogram Extremity Unilateral;  Surgeon: HOMERO Hayden II, MD;  Location: [address];  Service: Vascular;  Laterality: Left;  35.1 min  971.41 mGy  190.27 Gy.cm  101ml Dye    ANGIOGRAPHY OF LOWER EXTREMITY Right 7/19/2021    Procedure: Angiogram Extremity Unilateral;  Surgeon: HOMERO Hayden II, MD;  Location: [address]  [facility];  Service: Vascular;  Laterality: Right;  3.0 min  121.00 mGy  26.8713 Gy.cm  13ml Dye    AORTOGRAPHY N/A 5/13/2021    Procedure: AORTOGRAM;  Surgeon: HOMERO Hayden II, MD;  [address];  Service: Vascular;  Laterality: N/A;    AV FISTULA PLACEMENT Right 5/28/2018    Procedure: CREATION -FISTULA-AV;  Surgeon: RICK Pollard III, MD;  [address];  Service: Peripheral Vascular;  Laterality: Right;    BREAST BIOPSY  1/2012    left breast invasive mammary carcinoma (lateral bx) and intraductal papilloma (medial bx)    CARDIOVERSION  11/22/2021    CARDIOVERSION  11/22/2021    Procedure: Cardioversion;  Surgeon: Ad Garcia MD;  Location: [facility] C[facility] LAB;  Service: Cardiology;;    CATARACT EXTRACTION W/  INTRAOCULAR LENS IMPLANT Right 1/24/2019        CATARACT EXTRACTION W/  INTRAOCULAR LENS IMPLANT Left 1/31/2019    Procedure: EXTRACTION, CATARACT, WITH IOL INSERTION;  Surgeon: Immanuel Moncada MD;  Location[facility];  Service: Ophthalmology;  Laterality: Left;    CHOLECYSTECTOMY  2001    CORONARY ANGIOGRAPHY N/A 2/8/2022    Procedure: ANGIOGRAM, CORONARY ARTERY;  Surgeon: Abelardo Betancur MD;  Location: [facility] CATH LAB;  Service: Cardiology;  Laterality: N/A;    DEBRIDEMENT OF FOOT Right 2/17/2021    Procedure: DEBRIDEMENT, FOOT right plantar ulcer;  Surgeon: Sonja Corral DPM;  Location: [facility];  Service: Podiatry;  Laterality: Right;    ERCP W/ SPHICTEROTOMY      FINE NEEDLE ASPIRATION  1999    Right breast    FOOT AMPUTATION Left 6/1/2021    Procedure: Transmetatarsal amputation;  Surgeon: Billy Robles DPM;  [address]  Service: Podiatry;  Laterality: Left;    FOOT AMPUTATION Left 7/23/2021    Procedure: AMPUTATION, FOOT;  Surgeon: Billy Robles DPM;  [address]  Service: Podiatry;  Laterality: Left;    FOOT AMPUTATION THROUGH METATARSAL Right 10/15/2020    Procedure: PARTIAL RAY AMPUTATION GREAT TOE;  Surgeon: Billy  CODI Robles DPM;  Location: [address];  Service: Podiatry;  Laterality: Right;  Stretcher OK    HERNIA REPAIR      LAPAROSCOPIC NEPHRECTOMY Left 2011    MASTECTOMY  2013    left    OOPHORECTOMY Left 2001    REVISION OF ARTERIOVENOUS FISTULA Right 8/15/2018    Procedure: REVISION, AV FISTULA;  Surgeon: RICK Pollard III, MD;  Location: [facility] [address] [place] [address];  Service: Peripheral Vascular;  Laterality: Right;    SIGMOIDECTOMY  2001    SURGICAL REMOVAL OF METATARSAL HEAD Right 2/17/2021    Procedure: OSTECTOMY, METATARSAL BONE, diatal 1st;  Surgeon: Sonja Corral DPM;  Location: [facility];  Service: Podiatry;  Laterality: Right;    TOE AMPUTATION Left 5/13/2021    Procedure: AMPUTATION, TOE;  Surgeon: HOMERO Hayden II, MD;  Location: [facility] [address] [place] [address];  Service: Vascular;  Laterality: Left;    TOTAL REDUCTION MAMMOPLASTY Right 2013       Review of patient's allergies indicates:   Allergen Reactions    Cyclobenzaprine Hallucinations    Erythromycin      Stomach upset    Keflex [cephalexin]      Yeast infection    Erythromycin base      Other reaction(s): upset stomach       No current facility-administered medications on file prior to encounter.     Current Outpatient Medications on File Prior to Encounter   Medication Sig    acetaminophen-codeine 300-30mg (TYLENOL #3) 300-30 mg Tab Take 1 tablet by mouth every 8 (eight) hours as needed.    albuterol (PROVENTIL/VENTOLIN HFA) 90 mcg/actuation inhaler Inhale 2 puffs into the lungs every 6 (six) hours as needed for Wheezing. Rescue    amiodarone (PACERONE) 200 MG Tab Take 1 tablet (200 mg total) by mouth once daily.    apixaban (ELIQUIS) 2.5 mg Tab Take 1 tablet (2.5 mg total) by mouth 2 (two) times daily.    atorvastatin (LIPITOR) 40 MG tablet Take 1 tablet (40 mg total) by mouth once daily.    blood sugar diagnostic Strp 1 strip by Misc.(Non-Drug; Combo Route) route 4 (four) times daily.    blood-glucose sensor (DEXCOM G6 SENSOR) Umm 1 each by  Misc.(Non-Drug; Combo Route) route every 10 days.    blood-glucose transmitter (DEXCOM G6 TRANSMITTER) Umm 1 Device by Misc.(Non-Drug; Combo Route) route continuous.    clopidogreL (PLAVIX) 75 mg tablet Take 1 tablet (75 mg total) by mouth once daily.    fish oil-omega-3 fatty acids 300-1,000 mg capsule Take 1 capsule by mouth every morning.    flash glucose scanning reader (FREESTYLE LULU 14 DAY READER) Misc Use as directed to check blood sugars    flash glucose scanning reader (FREESTYLE LULU 2 READER) Misc 1 Units by Misc.(Non-Drug; Combo Route) route continuous prn.    flash glucose sensor (FREESTYLE LULU 14 DAY SENSOR) Kit Use as directed to check blood sugars    flash glucose sensor (FREESTYLE LULU 2 SENSOR) Kit 1 Units by Misc.(Non-Drug; Combo Route) route every 14 (fourteen) days.    insulin aspart U-100 (NOVOLOG) 100 unit/mL (3 mL) InPn pen Inject 3 Units into the skin 3 (three) times daily with meals.    insulin detemir U-100 (LEVEMIR FLEXTOUCH) 100 unit/mL (3 mL) SubQ InPn pen Inject 4 Units into the skin 2 (two) times daily.    lancets (ONETOUCH DELICA LANCETS) 33 gauge Misc 1 lancet by Misc.(Non-Drug; Combo Route) route 4 (four) times daily before meals and nightly.    methocarbamoL (ROBAXIN) 500 MG Tab Take 1 tablet (500 mg total) by mouth 3 (three) times daily.    methoxy peg-epoetin beta (MIRCERA INJ) 225 mcg.    metoprolol tartrate (LOPRESSOR) 25 MG tablet Take 1 tablet (25 mg total) by mouth 2 (two) times daily.    nitroGLYCERIN (NITROSTAT) 0.3 MG SL tablet Place 1 tablet (0.3 mg total) under the tongue every 5 (five) minutes as needed for Chest pain.    [DISCONTINUED] aspirin (ECOTRIN) 81 MG EC tablet Take 1 tablet (81 mg total) by mouth once daily. Take until 3/8/22, then stop unless further instructed by provider.    [DISCONTINUED] senna-docusate 8.6-50 mg (PERICOLACE) 8.6-50 mg per tablet Take 1 tablet by mouth 2 (two) times daily.    [DISCONTINUED] sevelamer carbonate  (RENVELA) 800 mg Tab Take 2 tablets (1,600 mg total) by mouth 3 (three) times daily with meals.     Family History       Problem Relation (Age of Onset)    Arthritis Mother    Breast cancer Maternal Grandmother    Cancer Maternal Grandmother, Maternal Aunt    Celiac disease Sister    Diabetes Father    Heart disease Mother, Father, Maternal Grandmother          Tobacco Use    Smoking status: Current Some Day Smoker     Packs/day: 0.50     Years: 28.00     Pack years: 14.00     Types: Cigarettes     Start date: 1990     Last attempt to quit: 2018     Years since quittin.2    Smokeless tobacco: Never Used    Tobacco comment: anxious   Substance and Sexual Activity    Alcohol use: No    Drug use: No    Sexual activity: Not Currently     Partners: Male     Review of Systems   Constitutional: Negative for chills, decreased appetite and fever.   HENT:  Negative for congestion and sore throat.    Eyes:  Negative for blurred vision and discharge.   Cardiovascular:  Negative for chest pain, claudication, cyanosis, dyspnea on exertion and leg swelling.   Respiratory:  Positive for shortness of breath and wheezing. Negative for cough and hemoptysis.    Endocrine: Negative for cold intolerance and heat intolerance.   Skin:  Negative for color change.   Musculoskeletal:  Negative for muscle weakness and myalgias.   Gastrointestinal:  Negative for bloating and abdominal pain.   Neurological:  Negative for dizziness, focal weakness and weakness.   Psychiatric/Behavioral:  Negative for altered mental status and depression.    Objective:     Vital Signs (Most Recent):  Temp: 98.1 °F (36.7 °C) (22 1459)  Pulse: 64 (22 1809)  Resp: (!) 24 (22 1809)  BP: (!) 171/88 (22 1543)  SpO2: 96 % (22 1809)   Vital Signs (24h Range):  Temp:  [98.1 °F (36.7 °C)] 98.1 °F (36.7 °C)  Pulse:  [62-69] 64  Resp:  [21-24] 24  SpO2:  [95 %-99 %] 96 %  BP: (109-171)/(60-88) 171/88     Weight: 104 kg (229 lb  4.5 oz)  Body mass index is 39.36 kg/m².    SpO2: 96 %  O2 Device (Oxygen Therapy): nasal cannula    No intake or output data in the 24 hours ending 03/30/22 1853    Lines/Drains/Airways       Drain  Duration                  Hemodialysis AV Fistula 02/17/22 Right forearm 41 days              Peripheral Intravenous Line  Duration                  Peripheral IV - Single Lumen 03/30/22 1657 20 G Left Antecubital <1 day                    Physical Exam  Constitutional:       Appearance: She is well-developed.   HENT:      Head: Normocephalic and atraumatic.      Right Ear: External ear normal.      Left Ear: External ear normal.   Eyes:      Conjunctiva/sclera: Conjunctivae normal.   Cardiovascular:      Rate and Rhythm: Normal rate and regular rhythm.      Pulses: Intact distal pulses.           Radial pulses are 2+ on the right side and 2+ on the left side.      Heart sounds: Normal heart sounds.      Comments: RUE AVF  Pulmonary:      Effort: Pulmonary effort is normal. No respiratory distress.      Breath sounds: Wheezing (expiratory) present.   Abdominal:      General: Bowel sounds are normal. There is no distension.      Palpations: Abdomen is soft.      Tenderness: There is no abdominal tenderness.   Musculoskeletal:         General: Normal range of motion.      Cervical back: Normal range of motion and neck supple.      Right lower leg: Edema present.      Left lower leg: Edema present.   Skin:     General: Skin is warm and dry.   Neurological:      Mental Status: She is alert and oriented to person, place, and time.   Psychiatric:         Mood and Affect: Mood normal.         Behavior: Behavior normal.       Significant Labs: CMP   Recent Labs   Lab 03/30/22  1656      K 7.2*   CL 92*   CO2 25   *   BUN 70*   CREATININE 9.0*   CALCIUM 9.2   PROT 7.5   ALBUMIN 3.8   BILITOT 0.9   ALKPHOS 98   AST 36   ALT 20   ANIONGAP 19*   ESTGFRAFRICA 4.7*   EGFRNONAA 4.1*   , CBC   Recent Labs   Lab  03/30/22  1656   WBC 4.91   HGB 12.0   HCT 38.4      , INR No results for input(s): INR, PROTIME in the last 48 hours., and Troponin   Recent Labs   Lab 03/30/22  1656   TROPONINI 3.472*       Significant Imaging: Echocardiogram: 2D echo with color flow doppler:   Results for orders placed or performed during the hospital encounter of 04/19/18   2D echo with color flow doppler   Result Value Ref Range    EF + QEF 51 55 - 65    Diastolic Dysfunction No     Narrative    Date of Procedure: 04/20/2018        TEST DESCRIPTION   Technical Quality: This is a technically challenging study.     Aorta: The aortic root is normal in size, measuring 2.5 cm at sinotubular junction.     Left Atrium: The left atrial volume index is normal, measuring 25.32 cc/m2.     Left Ventricle: The left ventricle is normal in size, with an end-diastolic diameter of 4.7 cm, and an end-systolic diameter of 3.5 cm. Posterior wall thickness is mildly increased, with the septum measuring 1.1 cm and the posterior wall measuring 1.4 cm   across. Relative wall thickness was increased at 0.60, and the LV mass index was increased at 111.6 g/m2 consistent with concentric left ventricular hypertrophy. There are no regional wall motion abnormalities. Left ventricular systolic function appears   low normal to mildly depressed. Visually estimated ejection fraction is 50-55%. The LV Doppler derived stroke volume equals 79.0 ccs.     Diastolic indices: E/A ratio 1.1,  msec., Diastolic function is abnormal.     Right Atrium: The right atrium is normal in size, measuring 4.6 cm in length and 3.7 cm in width in the apical view.     Right Ventricle: The right ventricle is normal in size. Global right ventricular systolic function appears normal. Tricuspid annular plane systolic excursion (TAPSE) is 2.2 cm.     Aortic Valve:  The aortic valve is not well seen. The mean gradient obtained across the aortic valve is 5 mmHg. Using a left ventricular outflow  tract diameter of 1.9 cm, a left ventricular outflow tract velocity time integral of 27 cm, and a peak   instantaneous transvalvular velocity time integral of 35 cm, the calculated aortic valve area is 2.24 cm2(AVAi is 0.94 cm2/m2).     Mitral Valve:  The mitral valve is normal in structure. The pressure half time is 72 msec. The calculated mitral valve area is 3.06 cm2.     Tricuspid Valve:  The tricuspid valve is normal in structure.     Pulmonary Valve:  The pulmonic valve is not well seen.     IVC: The IVC is not visualized.     Intracavitary: There is no evidence of pericardial effusion, intracavity mass, thrombi, or vegetation.         CONCLUSIONS     1 - Concentric hypertrophy.     2 - Low normal to mildly depressed left ventricular systolic function (EF 50-55%).     3 - Left ventricular diastolic dysfunction.     4 - Normal right ventricular systolic function .     5 - Technically difficult study.           This document has been electronically    SIGNED BY: Min Reis MD On: 04/20/2018 18:27    This document was originally electronically signed on: 04/20/2018 18:16    and Transthoracic echo (TTE) complete (Cupid Only):   Results for orders placed or performed during the hospital encounter of 02/04/22   Echo   Result Value Ref Range    Ascending aorta 2.66 cm    STJ 2.53 cm    AV mean gradient 6 mmHg    Ao peak colten 0.71 m/s    Ao VTI 32.88 cm    IVRT 74.22 msec    IVS 0.98 0.6 - 1.1 cm    LA size 3.90 cm    Left Atrium Major Axis 6.76 cm    Left Atrium Minor Axis 6.43 cm    LVIDd 5.20 3.5 - 6.0 cm    LVIDs 3.60 2.1 - 4.0 cm    LVOT diameter 1.98 cm    LVOT peak VTI 18.97 cm    Posterior Wall 0.70 0.6 - 1.1 cm    MV Peak A Colten 0.84 m/s    E wave deceleration time 124.98 msec    MV Peak E Colten 1.07 m/s    RA Major Axis 4.83 cm    RA Width 4.22 cm    RVDD 3.94 cm    Sinus 2.92 cm    TAPSE 1.68 cm    TR Max Colten 1.68 m/s    TDI LATERAL 0.04 m/s    TDI SEPTAL 0.04 m/s    LA WIDTH 4.84 cm    MV stenosis  pressure 1/2 time 36.24 ms    LV Diastolic Volume 83.47 mL    LV Systolic Volume 43.31 mL    RV S' 11.08 cm/s    LVOT peak sheldon 0.87 m/s    LA volume (mod) 73.33 cm3    LV LATERAL E/E' RATIO 26.75 m/s    LV SEPTAL E/E' RATIO 26.75 m/s    FS 31 %    LA volume 105.75 cm3    LV mass 154.56 g    Left Ventricle Relative Wall Thickness 0.27 cm    AV valve area 1.78 cm2    AV Velocity Ratio 1.23     AV index (prosthetic) 0.58     MV valve area p 1/2 method 6.07 cm2    E/A ratio 1.27     Mean e' 0.04 m/s    LVOT area 3.1 cm2    LVOT stroke volume 58.38 cm3    AV peak gradient 2 mmHg    E/E' ratio 26.75 m/s    LV Systolic Volume Index 21.0 mL/m2    LV Diastolic Volume Index 40.52 mL/m2    LA Volume Index 51.3 mL/m2    LV Mass Index 75 g/m2    Triscuspid Valve Regurgitation Peak Gradient 11 mmHg    LA Volume Index (Mod) 35.6 mL/m2    BSA 2.15 m2    Right Atrial Pressure (from IVC) 8 mmHg    EF 40 %    TV rest pulmonary artery pressure 19 mmHg    Narrative    · Severe left atrial enlargement.  · The left ventricle is normal in size with mildly decreased systolic   function.  · The estimated ejection fraction is 40%.  · There are segmental left ventricular wall motion abnormalities: apex,   apical septum, mid and apical anterolateral, apical inferior and apical   anterior wall.  · Grade II left ventricular diastolic dysfunction.  · Normal right ventricular size with normal right ventricular systolic   function.  · There is mild aortic valve stenosis.  · Aortic valve area is 1.78 cm2; peak velocity is 0.71 m/s; mean gradient   is 6 mmHg.  · Intermediate central venous pressure (8 mmHg).  · The estimated PA systolic pressure is 19 mmHg.        Assessment and Plan:     * NSTEMI (non-ST elevated myocardial infarction)  65 yo F PMHx significant for CAD s/p PCI on 02/08/2022 (on DAPT), left breast cancer s/p mastectomy, chronic respiratory failure (on home 2L of O2), COPD, ESRD on HD TTS w/ anemia of chronic disease, Atrial Fib  (failed DCCV in Nov 2021); on eliquis and amiodarone, CVA, PVD (s/p left foot amputation on Plavix), HTN, T2DM with history of osteomyelitis, HLD admitted from cardiology clinic for SOB.   Symptoms began in am and improved with inhaler therapy  Suspect type II NSTEMI given volume overload, likely copd exacerbation, and esrd  Recommend urgent hemodialysis for filtration an volume removal  Since patient lacked chest pain prior to last NSTEMI, would proceed with ACS protocol  Continue aspirin, plavix, statin, beta blocker. Switch eliquis to heparin ggt per acs protocol  Continue to trend troponin q6h or until peak.  Would recommend interventional cardiology consult if significant rise in troponin. If troponin remains flat, higher suspicion for type II.   Since patient is not in any acute distress, chest pain free, breathing comfortably, and hemodynamically stable, would recommend admission to medicine once HD is completed.  Cardiology will continue to follow    Type 2 diabetes mellitus with diabetic peripheral angiopathy and gangrene, with long-term current use of insulin  Management per primary team    Atrial fibrillation  Continue anticoagulation with heparin. Okay to continue home amio and metoprolol    Anemia in ESRD (end-stage renal disease)  Management per nephro    Hyperkalemia  Urgent dialysis per nephro    Acute on chronic respiratory failure  Suspect COPD exacerbation and volume overload  Management per primary team and nephro    Acute on chronic diastolic heart failure  Pt overloaded on exam likely 2/2 incomplete dialysis sessions  Management per nephrology    Hyperlipidemia  Continue HI statin        VTE Risk Mitigation (From admission, onward)    None          Thank you for your consult. I will follow-up with patient. Please contact us if you have any additional questions.    Beto Beck MD  Cardiology   [facility] - Emergency Dept

## 2022-03-31 NOTE — ASSESSMENT & PLAN NOTE
- Patient presents with NSTEMI   - start heparin ACS protocol, and continue for 48 hours  - Continue ASA 81mg daily, start DAPT with loading dose ASA 325mg once  - continue plavix 75 mg daily, start with loading dose clopidogrel 300 mg once  - lipid panel; atorvastatin 80mg daily  - echo to assess LV/RV function  - trend troponins x3 or until peaks  - discussed aggressive risk factor modifications  - monitor on telemetry  - serial ECG and nitro SL as needed for chest pain  - consult cards  - Holding BB and ACEi for now, need BP to get K down from > 7 w HD, restart when able

## 2022-03-31 NOTE — PROGRESS NOTES
1:1 beside HD treatment complete. 15g needles x2 removed. Sites held until hemostatis achieved and bandaged with gauze. Net UF 3000 mL. Report given to primary nurse.    03/30/22 2259        Hemodialysis AV Fistula 02/17/22 Right forearm   Placement Date: 02/17/22   Present Prior to Hospital Arrival?: Yes  Location: Right forearm   Needle Size 15ga   Site Assessment Clean;Dry;Intact;No redness;No swelling   Patency Present;Thrill;Bruit   Status Deaccessed   Dressing Intervention Other (Comment)  (Dressing applied)   Dressing Status Clean;Dry;Intact   Site Condition No complications   Dressing Gauze   During Hemodialysis Assessment   Blood Flow Rate (mL/min) 200 mL/min   Dialysate Flow Rate (mL/min) 800 ml/min   Arteriovenous Lines Secure Yes   Arterial Pressure (mmHg) -10 mmHg   Venous Pressure (mmHg) 80   UF Removed (mL) 3500 mL   TMP 60   Venous Line in Air Detector Yes   Transducer Dry Yes   Access Visible Yes   Intra-Hemodialysis Comments tx ended   Post-Hemodialysis Assessment   Rinseback Volume (mL) 250 mL   Blood Volume Processed (Liters) 60.3 L   Dialyzer Clearance Lightly streaked   Duration of Treatment (minutes) 180 minutes   Hemodialysis Intake (mL) 500 mL   Total UF (mL) 3500 mL   Net Fluid Removal 3000   Patient Response to Treatment elevated HR an BP   Arterial bleeding stop time (min) 10 min   Venous bleeding stop time (min) 15 min   Post-Hemodialysis Comments see note

## 2022-03-31 NOTE — ASSESSMENT & PLAN NOTE
67 yo F PMHx significant for CAD s/p PCI on 02/08/2022 (on DAPT), left breast cancer s/p mastectomy, chronic respiratory failure (on home 2L of O2), COPD, ESRD on HD TTS w/ anemia of chronic disease, Atrial Fib (failed DCCV in Nov 2021); on eliquis and amiodarone, CVA, PVD (s/p left foot amputation on Plavix), HTN, T2DM with history of osteomyelitis, HLD admitted from cardiology clinic for SOB.   Symptoms began in am and improved with inhaler therapy  Suspect type II NSTEMI given volume overload, likely copd exacerbation, and esrd  Recommend urgent hemodialysis for filtration an volume removal  Since patient lacked chest pain prior to last NSTEMI, would proceed with ACS protocol  Continue aspirin, plavix, statin, beta blocker. Switch eliquis to heparin ggt per acs protocol  Continue to trend troponin q6h or until peak.  Would recommend interventional cardiology consult if significant rise in troponin. If troponin remains flat, higher suspicion for type II.   Since patient is not in any acute distress, chest pain free, breathing comfortably, and hemodynamically stable, would recommend admission to medicine once HD is completed.  Cardiology will continue to follow

## 2022-03-31 NOTE — SUBJECTIVE & OBJECTIVE
Interval History: Interventional Cardiology planning [facility] after treating hyperkalemia.    Review of Systems   Constitutional:  Negative for chills and fever.   Respiratory:  Positive for shortness of breath. Negative for cough.    Neurological:  Negative for seizures and syncope.   Objective:     Vital Signs (Most Recent):  Temp: 97.8 °F (36.6 °C) (03/31/22 1113)  Pulse: 65 (03/31/22 1120)  Resp: 20 (03/31/22 1113)  BP: (!) 143/73 (03/31/22 1113)  SpO2: 97 % (03/31/22 1113)   Vital Signs (24h Range):  Temp:  [97.7 °F (36.5 °C)-98.4 °F (36.9 °C)] 97.8 °F (36.6 °C)  Pulse:  [] 65  Resp:  [12-30] 20  SpO2:  [94 %-100 %] 97 %  BP: (109-197)/(58-88) 143/73     Weight: 98.4 kg (217 lb)  Body mass index is 37.25 kg/m².    Intake/Output Summary (Last 24 hours) at 3/31/2022 1220  Last data filed at 3/30/2022 2251  Gross per 24 hour   Intake 500 ml   Output 3500 ml   Net -3000 ml      Physical Exam  Vitals and nursing note reviewed.   Constitutional:       General: She is not in acute distress.     Appearance: She is obese.   Pulmonary:      Effort: Pulmonary effort is normal. No respiratory distress.   Neurological:      Mental Status: She is alert. Mental status is at baseline.   Psychiatric:         Mood and Affect: Mood and affect normal.           Significant Labs:  CBC:  Recent Labs   Lab 03/30/22 1656 03/31/22 0922   WBC 4.91 3.57*   HGB 12.0 11.5*   HCT 38.4 38.0    137*     CMP:  Recent Labs   Lab 03/30/22 1656 03/31/22  0040 03/31/22 0922    136 134*   K 7.2* 4.8 5.7*   CL 92* 96 94*   CO2 25 21* 27   * 113* 130*   BUN 70* 38* 43*   CREATININE 9.0* 6.0* 6.8*   CALCIUM 9.2 9.2 9.5   PROT 7.5  --  7.2   ALBUMIN 3.8  --  3.5   BILITOT 0.9  --  0.9   ALKPHOS 98  --  83   AST 36  --  55*   ALT 20  --  21   ANIONGAP 19* 19* 13   EGFRNONAA 4.1* 6.7* 5.8*     PTINR:  Recent Labs   Lab 03/30/22 1952   INR 1.1       X-Ray Chest AP Portable 3/30/22: FINDINGS:   Cardiac silhouette is enlarged but  stable in size.  Lungs are symmetrically expanded.  Diffuse increased interstitial attenuation is seen.  No evidence of new focal consolidation, pneumothorax, or large pleural effusion.  No acute osseous abnormality identified.   Impression:  Diffuse increased interstitial attenuation suggestive for pulmonary edema.  Atypical pneumonia could present with similar appearance in the right clinical setting.

## 2022-03-31 NOTE — HPI
Kylie King is a 66 year old white woman with obesity, chronic obstructive pulmonary disease, coronary artery disease status post V stenting of left anterior descending artery and circumflex ostium on 2/8/2022, chronic systolic and diastolic heart failure, chronic hypoxic respiratory failure (on supplemental oxygen at 2 liters/minute), atrial fibrillation (anticoagulated on apixaban), diabetes mellitus type 2 (treated with insulin), peripheral artery disease, carotid artery stenosis, chronic venous insufficiency, hyperlipidemia, end stage renal disease on hemodialysis (Tuesday Thursday Saturday), secondary hyperparathyroidism, history of breast cancer status post left mastectomy in 2013, radiation, and chemotherapy, history of colon cancer status post sigmoid colectomy in 2001, osteoarthritis, lumbar degenerative disc disease, anxiety, depression, history of intracranial hemorrhage, history of choledocholithiasis status post endoscopic retrograde cholangiopancreatography and biliary stent placement on 2/27/2009, history of cholecystectomy in 2001, history of left nephrectomy on 8/16/2011, history of right partial ray amputation on 10/15/2020, history of left toe amputation on 5/13/2021, left transmetatarsal foot amputation on 6/1/2021, and left foot amputation on 7/23/2021. She is anuric. She lives in the [facility]. Her primary care physician is Dr. Virginia Foote.    She was hospitalized at [facility] from 2/4/2022 to 2/8/2022 for heart failure exacerbation and non-ST elevation myocardial infarction. She had stent placement.   She was hospitalized again at [facility] from 2/17/2022 to 2/20/2022 for hyperkalemia and encephalopathy.   She was hospitalized again at [facility] from 3/3/2022 to 3/7/2022 for hyperkalemia and hypervolemia.   She presented to [facility] on 3/30/2022 after she was seen  in Cardiology clinic and noticed to be volume overloaded on physical exam with shortness of breath on exertion. She had only received 1.5 hours of dialysis during her last session. Her potassium was found to be 7.4 mmol/L and BNP was greater than 4500 pg/mL. She underwent urgent hemodialysis in the emergency department. Troponin was 3.472 ng/mL and repeat troponin was 4.811 so acute coronary syndrome protocol was initiated and Cardiology was consulted. She was admitted to Hospital Medicine Team A.

## 2022-03-31 NOTE — NURSING
Patient received from ED, awake ,alerrt and oriented, trransported via wheel chair. Resp labored, SOB on exertion. Patient made comfortable in bed, oriented to room, call light, TV control. Tele monitor and Visy put on Patient denied any pain, discomfort at this time. Will continue to endorse care.

## 2022-03-31 NOTE — PLAN OF CARE
Problem: Infection (Hemodialysis)  Goal: Absence of Infection Signs and Symptoms  Outcome: Ongoing, Progressing     Problem: Adult Inpatient Plan of Care  Goal: Plan of Care Review  Outcome: Ongoing, Progressing  Goal: Patient-Specific Goal (Individualized)  Outcome: Ongoing, Progressing  Goal: Absence of Hospital-Acquired Illness or Injury  Outcome: Ongoing, Progressing  Goal: Optimal Comfort and Wellbeing  Outcome: Ongoing, Progressing  Goal: Readiness for Transition of Care  Outcome: Ongoing, Progressing     Problem: Diabetes Comorbidity  Goal: Blood Glucose Level Within Targeted Range  Outcome: Ongoing, Progressing     Problem: Fall Injury Risk  Goal: Absence of Fall and Fall-Related Injury  Outcome: Ongoing, Progressing     Problem: Skin Injury Risk Increased  Goal: Skin Health and Integrity  Outcome: Ongoing, Progressing   Pt free from fall, trauma and skin breakdown. Denied pain or discomfort. Free from SOB. Plan of care review. Pt verbalized understanding. All questions and concern adressed.  Bed in lowest position, locked, call bell in reach. Will continue to monitor patient.

## 2022-03-31 NOTE — SUBJECTIVE & OBJECTIVE
Past Medical History:   Diagnosis Date    Anxiety     Breast cancer     left    Carotid artery disease     Cataract     Choledocholithiasis     s/p ERCP and stent placement    Chronic diastolic CHF (congestive heart failure)     Chronic obstructive pulmonary disease     Chronic venous insufficiency     Depression     End-stage renal disease on hemodialysis     M/W/F    GERD (gastroesophageal reflux disease)     History of breast cancer     History of colon cancer 2001    s/p sigmoid colectomy    History of kidney stones     History of osteomyelitis of left foot     History of pancreatitis     History of stroke     Hyperlipidemia     Hypertension     Intracerebral hemorrhage     Lumbar degenerative disc disease     Lumbar spinal stenosis     Morbid obesity     Paroxysmal atrial fibrillation     Peripheral artery disease     Peripheral neuropathy     Pubic ramus fracture 2/17/2022    Tobacco dependence     Type II diabetes mellitus     Urinary incontinence        Past Surgical History:   Procedure Laterality Date    ANGIOGRAM, CORONARY, WITH LEFT HEART CATHETERIZATION N/A 2/7/2022    Procedure: Angiogram, Coronary, with Left Heart Cath;  Surgeon: Adam Rutherford MD;  Location: [facility] CATH LAB;  Service: Cardiology;  Laterality: N/A;    ANGIOGRAPHY OF LOWER EXTREMITY Right 9/17/2020    Procedure: Angiogram Extremity Unilateral;  Surgeon: RICK Pollard III, MD;  Location: [address];  Service: Peripheral Vascular;  Laterality: Right;  6.2 minutes of fluro  690.88 mGy  112.64 Gycm2  55 ml    ANGIOGRAPHY OF LOWER EXTREMITY Left 5/13/2021    Procedure: Angiogram Extremity Unilateral;  Surgeon: HOMERO Hayden II, MD;  Location: [address];  Service: Vascular;  Laterality: Left;  35.1 min  971.41 mGy  190.27 Gy.cm  101ml Dye    ANGIOGRAPHY OF LOWER EXTREMITY Right 7/19/2021    Procedure: Angiogram Extremity Unilateral;  Surgeon: HOMERO Hayden II, MD;  Location: [address];  Service: Vascular;   Laterality: Right;  3.0 min  121.00 mGy  26.8713 Gy.cm  13ml Dye    AORTOGRAPHY N/A 5/13/2021    Procedure: AORTOGRAM;  Surgeon: HOMERO Hayden II, MD;  [address];  Service: Vascular;  Laterality: N/A;    AV FISTULA PLACEMENT Right 5/28/2018    Procedure: CREATION -FISTULA-AV;  Surgeon: RICK Pollard III, MD;  [address];  Service: Peripheral Vascular;  Laterality: Right;    BREAST BIOPSY  1/2012    left breast invasive mammary carcinoma (lateral bx) and intraductal papilloma (medial bx)    CARDIOVERSION  11/22/2021    CARDIOVERSION  11/22/2021    Procedure: Cardioversion;  Surgeon: Ad Garcia MD;  Location: [facility] CATH LAB;  Service: Cardiology;;    CATARACT EXTRACTION W/  INTRAOCULAR LENS IMPLANT Right 1/24/2019        CATARACT EXTRACTION W/  INTRAOCULAR LENS IMPLANT Left 1/31/2019    Procedure: EXTRACTION, CATARACT, WITH IOL INSERTION;  Surgeon: Immanuel Moncada MD;  Location: [facility];  Service: Ophthalmology;  Laterality: Left;    CHOLECYSTECTOMY  2001    CORONARY ANGIOGRAPHY N/A 2/8/2022    Procedure: ANGIOGRAM, CORONARY ARTERY;  Surgeon: Abelardo Betancur MD;  Location: [facility] CATH LAB;  Service: Cardiology;  Laterality: N/A;    DEBRIDEMENT OF FOOT Right 2/17/2021    Procedure: DEBRIDEMENT, FOOT right plantar ulcer;  Surgeon: Sonja Corral DPM;  Location: [facility];  Service: Podiatry;  Laterality: Right;    ERCP W/ SPHICTEROTOMY      FINE NEEDLE ASPIRATION  1999    Right breast    FOOT AMPUTATION Left 6/1/2021    Procedure: Transmetatarsal amputation;  Surgeon: Billy Robles DPM;  [address];  Service: Podiatry;  Laterality: Left;    FOOT AMPUTATION Left 7/23/2021    Procedure: AMPUTATION, FOOT;  Surgeon: Billy Robles DPM;  [address];  Service: Podiatry;  Laterality: Left;    FOOT AMPUTATION THROUGH METATARSAL Right 10/15/2020    Procedure: PARTIAL RAY AMPUTATION GREAT TOE;  Surgeon: Billy Robles DPM;  Location: [address];   Service: Podiatry;  Laterality: Right;  Stretcher OK    HERNIA REPAIR      LAPAROSCOPIC NEPHRECTOMY Left 2011    MASTECTOMY  2013    left    OOPHORECTOMY Left 2001    REVISION OF ARTERIOVENOUS FISTULA Right 8/15/2018    Procedure: REVISION, AV FISTULA;  Surgeon: RICK Pollard III, MD;  Location: [facility] OR [address] [place] [address];  Service: Peripheral Vascular;  Laterality: Right;    SIGMOIDECTOMY  2001    SURGICAL REMOVAL OF METATARSAL HEAD Right 2/17/2021    Procedure: OSTECTOMY, METATARSAL BONE, diatal 1st;  Surgeon: Sonja Corral DPM;  Location: [facility] OR;  Service: Podiatry;  Laterality: Right;    TOE AMPUTATION Left 5/13/2021    Procedure: AMPUTATION, TOE;  Surgeon: HOMERO Hayden II, MD;  Location: [facility] OR [address] [place] [address];  Service: Vascular;  Laterality: Left;    TOTAL REDUCTION MAMMOPLASTY Right 2013       Review of patient's allergies indicates:   Allergen Reactions    Cyclobenzaprine Hallucinations    Erythromycin      Stomach upset    Keflex [cephalexin]      Yeast infection    Erythromycin base      Other reaction(s): upset stomach       No current facility-administered medications on file prior to encounter.     Current Outpatient Medications on File Prior to Encounter   Medication Sig    acetaminophen-codeine 300-30mg (TYLENOL #3) 300-30 mg Tab Take 1 tablet by mouth every 8 (eight) hours as needed.    albuterol (PROVENTIL/VENTOLIN HFA) 90 mcg/actuation inhaler Inhale 2 puffs into the lungs every 6 (six) hours as needed for Wheezing. Rescue    amiodarone (PACERONE) 200 MG Tab Take 1 tablet (200 mg total) by mouth once daily.    apixaban (ELIQUIS) 2.5 mg Tab Take 1 tablet (2.5 mg total) by mouth 2 (two) times daily.    atorvastatin (LIPITOR) 40 MG tablet Take 1 tablet (40 mg total) by mouth once daily.    blood sugar diagnostic Strp 1 strip by Misc.(Non-Drug; Combo Route) route 4 (four) times daily.    blood-glucose sensor (DEXCOM G6 SENSOR) Umm 1 each by Misc.(Non-Drug; Combo Route) route every 10 days.    blood-glucose  transmitter (DEXCOM G6 TRANSMITTER) Umm 1 Device by Misc.(Non-Drug; Combo Route) route continuous.    clopidogreL (PLAVIX) 75 mg tablet Take 1 tablet (75 mg total) by mouth once daily.    fish oil-omega-3 fatty acids 300-1,000 mg capsule Take 1 capsule by mouth every morning.    flash glucose scanning reader (FREESTYLE LULU 14 DAY READER) Misc Use as directed to check blood sugars    flash glucose scanning reader (FREESTYLE LULU 2 READER) Misc 1 Units by Misc.(Non-Drug; Combo Route) route continuous prn.    flash glucose sensor (FREESTYLE LULU 14 DAY SENSOR) Kit Use as directed to check blood sugars    flash glucose sensor (FREESTYLE LULU 2 SENSOR) Kit 1 Units by Misc.(Non-Drug; Combo Route) route every 14 (fourteen) days.    insulin aspart U-100 (NOVOLOG) 100 unit/mL (3 mL) InPn pen Inject 3 Units into the skin 3 (three) times daily with meals.    insulin detemir U-100 (LEVEMIR FLEXTOUCH) 100 unit/mL (3 mL) SubQ InPn pen Inject 4 Units into the skin 2 (two) times daily.    lancets (ONETOUCH DELICA LANCETS) 33 gauge Misc 1 lancet by Misc.(Non-Drug; Combo Route) route 4 (four) times daily before meals and nightly.    methocarbamoL (ROBAXIN) 500 MG Tab Take 1 tablet (500 mg total) by mouth 3 (three) times daily.    methoxy peg-epoetin beta (MIRCERA INJ) 225 mcg.    metoprolol tartrate (LOPRESSOR) 25 MG tablet Take 1 tablet (25 mg total) by mouth 2 (two) times daily.    nitroGLYCERIN (NITROSTAT) 0.3 MG SL tablet Place 1 tablet (0.3 mg total) under the tongue every 5 (five) minutes as needed for Chest pain.    [DISCONTINUED] aspirin (ECOTRIN) 81 MG EC tablet Take 1 tablet (81 mg total) by mouth once daily. Take until 3/8/22, then stop unless further instructed by provider.    [DISCONTINUED] senna-docusate 8.6-50 mg (PERICOLACE) 8.6-50 mg per tablet Take 1 tablet by mouth 2 (two) times daily.    [DISCONTINUED] sevelamer carbonate (RENVELA) 800 mg Tab Take 2 tablets (1,600 mg total) by mouth 3 (three) times daily  with meals.     Family History       Problem Relation (Age of Onset)    Arthritis Mother    Breast cancer Maternal Grandmother    Cancer Maternal Grandmother, Maternal Aunt    Celiac disease Sister    Diabetes Father    Heart disease Mother, Father, Maternal Grandmother          Tobacco Use    Smoking status: Current Some Day Smoker     Packs/day: 0.50     Years: 28.00     Pack years: 14.00     Types: Cigarettes     Start date: 1990     Last attempt to quit: 2018     Years since quittin.2    Smokeless tobacco: Never Used    Tobacco comment: anxious   Substance and Sexual Activity    Alcohol use: No    Drug use: No    Sexual activity: Not Currently     Partners: Male     Review of Systems   Constitutional:  Positive for activity change and fatigue. Negative for fever.   HENT:  Negative for sore throat and trouble swallowing.    Eyes:  Negative for photophobia and visual disturbance.   Respiratory:  Positive for shortness of breath. Negative for chest tightness.    Cardiovascular:  Negative for chest pain, palpitations and leg swelling.   Gastrointestinal:  Negative for abdominal distention, abdominal pain, blood in stool, constipation, diarrhea, nausea and vomiting.   Endocrine: Negative for polydipsia and polyuria.   Genitourinary:  Positive for difficulty urinating. Negative for dysuria and hematuria.   Musculoskeletal:  Negative for neck pain and neck stiffness.   Skin:  Negative for pallor and rash.   Allergic/Immunologic: Negative for immunocompromised state.   Neurological:  Negative for dizziness, seizures, syncope and facial asymmetry.   Psychiatric/Behavioral:  Negative for agitation, behavioral problems and confusion.    Objective:     Vital Signs (Most Recent):  Temp: 98.1 °F (36.7 °C) (22)  Pulse: 75 (22)  Resp: (!) 24 (22)  BP: (!) 173/67 (22)  SpO2: 100 % (22)   Vital Signs (24h Range):  Temp:  [98.1 °F (36.7 °C)-98.4 °F (36.9 °C)] 98.1  °F (36.7 °C)  Pulse:  [] 75  Resp:  [12-30] 24  SpO2:  [95 %-100 %] 100 %  BP: (109-178)/(59-88) 173/67     Weight: 104 kg (229 lb 4.5 oz)  Body mass index is 39.36 kg/m².    Physical Exam  Vitals and nursing note reviewed.   Constitutional:       General: She is not in acute distress.     Appearance: She is well-developed. She is obese. She is ill-appearing. She is not diaphoretic.   HENT:      Head: Normocephalic and atraumatic.      Mouth/Throat:      Mouth: Mucous membranes are moist.      Pharynx: No oropharyngeal exudate.   Eyes:      General: No scleral icterus.     Pupils: Pupils are equal, round, and reactive to light.   Neck:      Thyroid: No thyromegaly.   Cardiovascular:      Rate and Rhythm: Normal rate and regular rhythm.      Heart sounds: Murmur heard.     No friction rub. No gallop.   Pulmonary:      Effort: Pulmonary effort is normal.      Breath sounds: No stridor. Rales present. No wheezing.   Abdominal:      General: There is no distension.      Palpations: Abdomen is soft. There is no mass.      Tenderness: There is no abdominal tenderness. There is no guarding.   Musculoskeletal:         General: Deformity present. Normal range of motion.      Cervical back: Normal range of motion and neck supple. No rigidity.      Right lower leg: Edema present.      Left lower leg: Edema present.      Comments: L Below ankle/midfoot amputation   Lymphadenopathy:      Cervical: No cervical adenopathy.   Skin:     General: Skin is warm and dry.      Capillary Refill: Capillary refill takes less than 2 seconds.      Coloration: Skin is not jaundiced.      Findings: No bruising.   Neurological:      Mental Status: She is alert and oriented to person, place, and time. Mental status is at baseline.      Cranial Nerves: No cranial nerve deficit.   Psychiatric:         Mood and Affect: Mood normal.         Behavior: Behavior normal.         CRANIAL NERVES     CN III, IV, VI   Pupils are equal, round, and  reactive to light.         Recent Results (from the past 24 hour(s))   CBC auto differential    Collection Time: 03/30/22  4:56 PM   Result Value Ref Range    WBC 4.91 3.90 - 12.70 K/uL    RBC 4.20 4.00 - 5.40 M/uL    Hemoglobin 12.0 12.0 - 16.0 g/dL    Hematocrit 38.4 37.0 - 48.5 %    MCV 91 82 - 98 fL    MCH 28.6 27.0 - 31.0 pg    MCHC 31.3 (L) 32.0 - 36.0 g/dL    RDW 19.5 (H) 11.5 - 14.5 %    Platelets 168 150 - 450 K/uL    MPV 11.2 9.2 - 12.9 fL    Immature Granulocytes 0.2 0.0 - 0.5 %    Gran # (ANC) 3.9 1.8 - 7.7 K/uL    Immature Grans (Abs) 0.01 0.00 - 0.04 K/uL    Lymph # 0.7 (L) 1.0 - 4.8 K/uL    Mono # 0.3 0.3 - 1.0 K/uL    Eos # 0.0 0.0 - 0.5 K/uL    Baso # 0.03 0.00 - 0.20 K/uL    nRBC 0 0 /100 WBC    Gran % 78.4 (H) 38.0 - 73.0 %    Lymph % 14.7 (L) 18.0 - 48.0 %    Mono % 5.9 4.0 - 15.0 %    Eosinophil % 0.2 0.0 - 8.0 %    Basophil % 0.6 0.0 - 1.9 %    Differential Method Automated    Comprehensive metabolic panel    Collection Time: 03/30/22  4:56 PM   Result Value Ref Range    Sodium 136 136 - 145 mmol/L    Potassium 7.2 (HH) 3.5 - 5.1 mmol/L    Chloride 92 (L) 95 - 110 mmol/L    CO2 25 23 - 29 mmol/L    Glucose 130 (H) 70 - 110 mg/dL    BUN 70 (H) 8 - 23 mg/dL    Creatinine 9.0 (H) 0.5 - 1.4 mg/dL    Calcium 9.2 8.7 - 10.5 mg/dL    Total Protein 7.5 6.0 - 8.4 g/dL    Albumin 3.8 3.5 - 5.2 g/dL    Total Bilirubin 0.9 0.1 - 1.0 mg/dL    Alkaline Phosphatase 98 55 - 135 U/L    AST 36 10 - 40 U/L    ALT 20 10 - 44 U/L    Anion Gap 19 (H) 8 - 16 mmol/L    eGFR if African American 4.7 (A) >60 mL/min/1.73 m^2    eGFR if non  4.1 (A) >60 mL/min/1.73 m^2   Brain natriuretic peptide    Collection Time: 03/30/22  4:56 PM   Result Value Ref Range    BNP 4,554 (H) 0 - 99 pg/mL   Troponin I    Collection Time: 03/30/22  4:56 PM   Result Value Ref Range    Troponin I 3.472 (H) 0.000 - 0.026 ng/mL   Magnesium    Collection Time: 03/30/22  4:56 PM   Result Value Ref Range    Magnesium 2.1 1.6 - 2.6  mg/dL   Phosphorus    Collection Time: 03/30/22  4:56 PM   Result Value Ref Range    Phosphorus 9.1 (HH) 2.7 - 4.5 mg/dL   POCT COVID-19 Rapid Screening    Collection Time: 03/30/22  5:35 PM   Result Value Ref Range    POC Rapid COVID Negative Negative     Acceptable Yes    Troponin I    Collection Time: 03/30/22  7:52 PM   Result Value Ref Range    Troponin I 4.811 (H) 0.000 - 0.026 ng/mL   Protime-INR    Collection Time: 03/30/22  7:52 PM   Result Value Ref Range    Prothrombin Time 11.4 9.0 - 12.5 sec    INR 1.1 0.8 - 1.2   APTT    Collection Time: 03/30/22  7:52 PM   Result Value Ref Range    aPTT 27.6 21.0 - 32.0 sec       Microbiology Results (last 7 days)       ** No results found for the last 168 hours. **             Imaging Results              X-Ray Chest AP Portable (Final result)  Result time 03/30/22 18:11:35      Final result by Duyen Lenz MD (03/30/22 18:11:35)                   Impression:      Diffuse increased interstitial attenuation suggestive for pulmonary edema.  Atypical pneumonia could present with similar appearance in the right clinical setting.      Electronically signed by: Duyen Lenz MD  Date:    03/30/2022  Time:    18:11               Narrative:    EXAMINATION:  XR CHEST AP PORTABLE    TECHNIQUE:  Single frontal view of the chest was performed.    COMPARISON:  03/04/2022.    FINDINGS:  Cardiac silhouette is enlarged but stable in size.  Lungs are symmetrically expanded.  Diffuse increased interstitial attenuation is seen.  No evidence of new focal consolidation, pneumothorax, or large pleural effusion.  No acute osseous abnormality identified.

## 2022-03-31 NOTE — PROGRESS NOTES
[facility] - Cardiology Stepdown  Cardiology  Progress Note    Patient Name: Kylie King  MRN: [id number]  Admission Date: 3/30/2022  Hospital Length of Stay: 1 days  Code Status: Full Code   Attending Physician: Enoc Greco MD   Primary Care Physician: Virginia Foote MD  Expected Discharge Date:   Principal Problem:NSTEMI (non-ST elevated myocardial infarction)    Subjective:     Hospital Course:   No notes on file    Interval History: NAEO. Patients tarted on ACS protocol and received HD yesterday with 3L removed. Repeat trop markedly elevated. Plan for intervention today per IC. Patient was not on DAPT previously. Though received clopidogrel load yesterday, was not started on QD dosing; start clopidogrel 75mg QD.   Review of Systems   Constitutional: Negative for chills, decreased appetite and fever.   HENT:  Negative for congestion and sore throat.    Eyes:  Negative for blurred vision and discharge.   Cardiovascular:  Negative for chest pain, claudication, cyanosis, dyspnea on exertion, leg swelling and palpitations.   Respiratory:  Negative for cough, hemoptysis, shortness of breath and wheezing.    Endocrine: Negative for cold intolerance and heat intolerance.   Skin:  Negative for color change and rash.   Musculoskeletal:  Negative for arthritis, muscle weakness and myalgias.   Gastrointestinal:  Negative for bloating, abdominal pain, constipation, diarrhea, nausea and vomiting.   Genitourinary:  Negative for dysuria.   Neurological:  Negative for dizziness, focal weakness, headaches and weakness.   Psychiatric/Behavioral:  Negative for altered mental status and depression.    Objective:     Vital Signs (Most Recent):  Temp: 97.8 °F (36.6 °C) (03/31/22 1113)  Pulse: 64 (03/31/22 1235)  Resp: 18 (03/31/22 1235)  BP: (!) 143/73 (03/31/22 1113)  SpO2: 98 % (03/31/22 1235)   Vital Signs (24h Range):  Temp:  [97.7 °F (36.5 °C)-98.4 °F (36.9 °C)] 97.8 °F (36.6 °C)  Pulse:  [] 64  Resp:  [12-30]  18  SpO2:  [94 %-100 %] 98 %  BP: (109-197)/(58-88) 143/73     Weight: 98.4 kg (217 lb)  Body mass index is 37.25 kg/m².     SpO2: 98 %  O2 Device (Oxygen Therapy): nasal cannula      Intake/Output Summary (Last 24 hours) at 3/31/2022 1254  Last data filed at 3/30/2022 2251  Gross per 24 hour   Intake 500 ml   Output 3500 ml   Net -3000 ml       Lines/Drains/Airways       Drain  Duration                  Hemodialysis AV Fistula 02/17/22 Right forearm 42 days              Peripheral Intravenous Line  Duration                  Peripheral IV - Single Lumen 03/30/22 1657 20 G Left Antecubital <1 day                    Physical Exam  Vitals and nursing note reviewed.   Constitutional:       Appearance: Normal appearance. She is obese. She is not ill-appearing.   HENT:      Head: Normocephalic and atraumatic.   Cardiovascular:      Rate and Rhythm: Normal rate and regular rhythm.      Pulses: Normal pulses.      Heart sounds: Normal heart sounds. No murmur heard.    No friction rub. No gallop.   Pulmonary:      Effort: Pulmonary effort is normal. No respiratory distress.      Breath sounds: Rales present. No wheezing.   Abdominal:      General: Abdomen is flat. Bowel sounds are normal. There is no distension.      Palpations: Abdomen is soft.      Tenderness: There is no abdominal tenderness.   Musculoskeletal:      Right lower leg: Edema present.      Left lower leg: Edema present.   Skin:     General: Skin is warm.   Neurological:      General: No focal deficit present.      Mental Status: She is alert and oriented to person, place, and time.   Psychiatric:         Mood and Affect: Mood normal.         Behavior: Behavior normal.       Significant Labs: All pertinent lab results from the last 24 hours have been reviewed.    Significant Imaging:  Reviewed.    Assessment and Plan:     * NSTEMI (non-ST elevated myocardial infarction)  Concern for in stent thrombosis/currenct infarction given worsened EF and dynamic  trop  Anti platelet: ASA 81+ Plavix 75  Allergies: none to contrast  Access: left radial then left fem as secondary if needed   Catheter: Bright   -The risks, benefits & alternatives of the procedure were explained to the patient.    -The risks of coronary angiography include but are not limited to:  Bleeding, infection, heart rhythm abnormalities, allergic reactions, kidney injury, stroke and death.    -Should stenting be indicated, the patient has agreed to dual anti-platelet therapy for 1-consecutive year with a drug-eluting stent and a minimum of 1-month with the use of a bare metal stent.    -The risks of moderate sedation include hypotension, respiratory depression, arrhythmias, bronchospasm, & death.    -Informed consent was obtained & the patient is agreeable to proceed with the procedure.  -This patient was discussed with the attending interventional cardiologist who agrees with the above assessment & plan.        Type 2 diabetes mellitus with diabetic peripheral angiopathy and gangrene, with long-term current use of insulin  Management per primary team    Atrial fibrillation  Continue anticoagulation with heparin. Okay to continue home amio and metoprolol    Anemia in ESRD (end-stage renal disease)  Management per nephro    Hyperkalemia  Patient received HD 3/30. Shifted K 3/31 for hyperkalemia prior to intervention per IC.    Acute on chronic combined systolic and diastolic heart failure  Pt overloaded on exam likely 2/2 incomplete dialysis sessions  - Management/dialysis per nephrology    Hyperlipidemia  Continue HI statin  - Rec lipid panel      VTE Risk Mitigation (From admission, onward)         Ordered     heparin 25,000 units in dextrose 5% (100 units/ml) IV bolus from bag - ADDITIONAL PRN BOLUS - 60 units/kg (max bolus 4000 units)  As needed (PRN)        Question:  Heparin Infusion Adjustment (DO NOT MODIFY ANSWER)  Answer:  [email or web address]\epic\Images\Pharmacy\HeparinInfusions\heparin LOW INTENSITY  nomogram for [facility] [email or web address]    03/30/22 1925     heparin 25,000 units in dextrose 5% (100 units/ml) IV bolus from bag - ADDITIONAL PRN BOLUS - 30 units/kg (max bolus 4000 units)  As needed (PRN)        Question:  Heparin Infusion Adjustment (DO NOT MODIFY ANSWER)  Answer:  [email or web address]\epic\Images\Pharmacy\HeparinInfusions\heparin LOW INTENSITY nomogram for [facility] [id number]    03/30/22 1925     heparin 25,000 units in dextrose 5% 250 mL (100 units/mL) infusion LOW INTENSITY nomogram - [facility]  Continuous        Question Answer Comment   Heparin Infusion Adjustment (DO NOT MODIFY ANSWER) [email or web address]\epic\Images\Pharmacy\HeparinInfusions\heparin LOW INTENSITY nomogram for [facility] [id number]    Begin at (in units/kg/hr) 12        03/30/22 1925     IP VTE HIGH RISK PATIENT  Once         03/30/22 1922     Place sequential compression device  Until discontinued         03/30/22 1922                Fracisco Del Rio MD  Cardiology  [facility] - [facility]

## 2022-03-31 NOTE — CONSULTS
"[facility]  Cardiology  Consult Note    Patient Name: Kylie King  MRN: [id number]  Admission Date: 3/30/2022  Hospital Length of Stay: 1 days  Code Status: Full Code   Attending Provider: Enoc Greco MD   Consulting Provider: Teresita Barney MD  Primary Care Physician: Virginia Foote MD  Principal Problem:NSTEMI (non-ST elevated myocardial infarction)    Patient information was obtained from patient and ER records.     Inpatient consult to Interventional Cardiology  Consult performed by: Teresita Barney MD  Consult ordered by: Enoc Greco MD        Subjective:         HPI:      66 y.o. year old female with PMHx significant for CAD s/p PCI on 02/08/2022 (2.42C97GS stent X2, "V stenting on plavix only), left breast cancer s/p mastectomy, chronic respiratory failure (on home 2L of O2), COPD, ESRD on HD TTS w/ anemia of chronic disease, Atrial Fib (failed DCCV in Nov 2021); on eliquis and amiodarone, CVA, PVD (s/p left foot amputation on Plavix), HTN, T2DM with history of osteomyelitis, HLD admitted to observation for shortness of breath 2/2 volume overload.      Interval History:  She had a recent hospital admission on 2/4/22 with a cc of dyspnea admitted with NSTEMI s/p PCI to LAD and ostial Lcx (2.44T59QQ stent X2, "V stenting") . Cath note from that hospitalization recommended discharge on triple therapy for 6 mos ( end date 8/22) however she reports she was instructed to continue triple therapy for one month only and stopped ASA one week ago . In addition, she has had history of hyperkalemia ( up to 8.4) with another recent admission requiring shifting (3/7/22). Most recent labwork showing k 5.0. Of note, prior notes with history of medication non adherence. She was seen by myself yesterday in clinic after she had a fall whenn transitiong from sitting to standing and cut her last HD session short yesterday only going for 1.5 hours (due to diarrhea) when she usually goes for 5 " "hours. Her reported dry weight is 99kgs and today also reports worsening orthopnea and was wokened up from bed with SOB at 4 am. Of note, she is anuri and very sensitive to missed or shortened dialysis sessions and has had several recent hospitalizations from volume/ electrolytes issues. In regards to her ongoing diarrhea, unclear cause as she denies recent ABx use and recent C. Diff testing negative. Denies taking laxatives even though listed in med rec.     Hospital course:   She was subsequently admitted to ED for urgent HD however noted to have NSTEMI and admitted to hospital medicine. Trop notable for rise 4--> 25 with EKG showng IVCD/LBBB and TTE with worsening EF 40%--> 28% and RWMA in LAD and Lcx territory. Per my exam, patient asymptomatic however she was also asymptomatic with last NSTEMI presentation. She was loaded with ASA and plavix and on hep gtt however labwork notable for persistent hyperkalemia K 5.7 Hg 12 Plt 168. She is scheduled for HD today. IC has been consulted for repeat LHC.      LHC 2/8/22  1. Successful treatment of a heavily calcified ostial left circumflex stenosis which cause admission for unstable angina.  2. "V"stenting of the LAD and circumflex ostium was accomplished.  High-pressure balloon inflations were used.    Summary:  1. Successful treatment of critical ostial left circumflex disease by doing V stenting of the ostial left main and left circumflex.  Distal diagonal lesion left untreated.  2. End-stage renal disease on hemodialysis  3. Chronic will send area was unstable angina and non-STEMI.  Recommendations:  1. Dual antiplatelet therapy for 6-12 months  2. Postop care  3. follow-up in General Cardiology and with her PCP and dialysis physicians.    TTE 3/31/22  · The left ventricle is mildly enlarged with mild eccentric hypertrophy and severely decreased systolic function.  · The estimated ejection fraction is 28%.  · There are segmental left ventricular wall motion " "abnormalities.  · Grade II left ventricular diastolic dysfunction.  · Severe left atrial enlargement.  · Mild right ventricular enlargement with low normal right ventricular systolic function.  · Mild right atrial enlargement.  · There is mild aortic valve stenosis.  · Aortic valve area is 1.62 cm2; peak velocity is 1.46 m/s; mean gradient is 4 mmHg.  · Mild mitral regurgitation.  · Elevated central venous pressure (15 mmHg).     TTE 2/5/22  · Severe left atrial enlargement.  · The left ventricle is normal in size with mildly decreased systolic function.  · The estimated ejection fraction is 40%.  · There are segmental left ventricular wall motion abnormalities: apex, apical septum, mid and apical anterolateral, apical inferior and apical anterior wall.  · Grade II left ventricular diastolic dysfunction.  · Normal right ventricular size with normal right ventricular systolic function.  · There is mild aortic valve stenosis.  · Aortic valve area is 1.78 cm2; peak velocity is 0.71 m/s; mean gradient is 6 mmHg.  · Intermediate central venous pressure (8 mmHg).  · The estimated PA systolic pressure is 19 mmHg.    In the ER, kandyn found to be hypertensive 171/88 but otherwise hemodynamically stable. ECG showing non-specific st-t wave changes. Labs notable for troponin of 3.5, bnp of 4550, potassium of 7, potassium of 9.      [facility] 2/8/22  1. Successful treatment of a heavily calcified ostial left circumflex stenosis which cause admission for unstable angina.  2. "V"stenting of the LAD and circumflex ostium was accomplished.  High-pressure balloon inflations were used.    Summary:  1. Successful treatment of critical ostial left circumflex disease by doing V stenting of the ostial left main and left circumflex.  Distal diagonal lesion left untreated.  2. End-stage renal disease on hemodialysis  3. Chronic will send area was unstable angina and non-STEMI.  Recommendations:  1. Dual antiplatelet therapy for 6-12 months  2. " Postop care  3. follow-up in General Cardiology and with her PCP and dialysis physicians.    Diagnostic 2/7/22      PCI 2/8/22    TTE 2/5/22  ·           Severe left atrial enlargement.  ·           The left ventricle is normal in size with mildly decreased systolic function.  ·           The estimated ejection fraction is 40%.  ·           There are segmental left ventricular wall motion abnormalities: apex, apical septum, mid and apical anterolateral, apical inferior and apical anterior wall.  ·           Grade II left ventricular diastolic dysfunction.  ·           Normal right ventricular size with normal right ventricular systolic function.  ·           There is mild aortic valve stenosis.  ·           Aortic valve area is 1.78 cm2; peak velocity is 0.71 m/s; mean gradient is 6 mmHg.  ·           Intermediate central venous pressure (8 mmHg).  ·           The estimated PA systolic pressure is 19 mmHg.             ROS see HPI   Objective:     Vital Signs (Most Recent):  Temp: 97.8 °F (36.6 °C) (03/31/22 1113)  Pulse: 65 (03/31/22 1120)  Resp: 20 (03/31/22 1113)  BP: (!) 143/73 (03/31/22 1113)  SpO2: 97 % (03/31/22 1113)   Vital Signs (24h Range):  Temp:  [97.7 °F (36.5 °C)-98.4 °F (36.9 °C)] 97.8 °F (36.6 °C)  Pulse:  [] 65  Resp:  [12-30] 20  SpO2:  [94 %-100 %] 97 %  BP: (109-197)/(58-88) 143/73     Weight: 98.4 kg (217 lb)  Body mass index is 37.25 kg/m².     SpO2: 97 %  O2 Device (Oxygen Therapy): nasal cannula      Intake/Output Summary (Last 24 hours) at 3/31/2022 1140  Last data filed at 3/30/2022 2251  Gross per 24 hour   Intake 500 ml   Output 3500 ml   Net -3000 ml       Lines/Drains/Airways       Drain  Duration                  Hemodialysis AV Fistula 02/17/22 Right forearm 42 days              Peripheral Intravenous Line  Duration                  Peripheral IV - Single Lumen 03/30/22 1657 20 G Left Antecubital <1 day                    Physical Exam  Constitutional: No distress, obese,  conversant  HEENT: Sclera anicteric, PERRLA, EOMI  Neck:o masses, good movement  CV: RRR, S1 and S2 normal, no additional heart sounds or murmurs. Pulses 2+ and equal bilaterally in radial arteries, Jaime's normal on right. Distal pulses are 2+ and equal in the femoral, DP and PT areas bilaterally  Pulm: + crackles  GI: Abdomen soft, non-tender, good bowel sounds  Extremities: Both extremities intact and grossly normal, skin is warm,  + venous stasis changes, amputation of left LE, AVF + hum of right UE   Skin: No ecchymosis, erythema, or ulcers  Psych: AOx3, appropriate affect  Neuro: CNII-XII intact, no focal deficits    Significant Labs: All pertinent lab results from the last 24 hours have been reviewed.    Significant Imaging: Echocardiogram: 2D echo with color flow doppler:   Results for orders placed or performed during the hospital encounter of 04/19/18   2D echo with color flow doppler   Result Value Ref Range    EF + QEF 51 55 - 65    Diastolic Dysfunction No     Narrative    Date of Procedure: 04/20/2018        TEST DESCRIPTION   Technical Quality: This is a technically challenging study.     Aorta: The aortic root is normal in size, measuring 2.5 cm at sinotubular junction.     Left Atrium: The left atrial volume index is normal, measuring 25.32 cc/m2.     Left Ventricle: The left ventricle is normal in size, with an end-diastolic diameter of 4.7 cm, and an end-systolic diameter of 3.5 cm. Posterior wall thickness is mildly increased, with the septum measuring 1.1 cm and the posterior wall measuring 1.4 cm   across. Relative wall thickness was increased at 0.60, and the LV mass index was increased at 111.6 g/m2 consistent with concentric left ventricular hypertrophy. There are no regional wall motion abnormalities. Left ventricular systolic function appears   low normal to mildly depressed. Visually estimated ejection fraction is 50-55%. The LV Doppler derived stroke volume equals 79.0 ccs.     Diastolic  indices: E/A ratio 1.1,  msec., Diastolic function is abnormal.     Right Atrium: The right atrium is normal in size, measuring 4.6 cm in length and 3.7 cm in width in the apical view.     Right Ventricle: The right ventricle is normal in size. Global right ventricular systolic function appears normal. Tricuspid annular plane systolic excursion (TAPSE) is 2.2 cm.     Aortic Valve:  The aortic valve is not well seen. The mean gradient obtained across the aortic valve is 5 mmHg. Using a left ventricular outflow tract diameter of 1.9 cm, a left ventricular outflow tract velocity time integral of 27 cm, and a peak   instantaneous transvalvular velocity time integral of 35 cm, the calculated aortic valve area is 2.24 cm2(AVAi is 0.94 cm2/m2).     Mitral Valve:  The mitral valve is normal in structure. The pressure half time is 72 msec. The calculated mitral valve area is 3.06 cm2.     Tricuspid Valve:  The tricuspid valve is normal in structure.     Pulmonary Valve:  The pulmonic valve is not well seen.     IVC: The IVC is not visualized.     Intracavitary: There is no evidence of pericardial effusion, intracavity mass, thrombi, or vegetation.         CONCLUSIONS     1 - Concentric hypertrophy.     2 - Low normal to mildly depressed left ventricular systolic function (EF 50-55%).     3 - Left ventricular diastolic dysfunction.     4 - Normal right ventricular systolic function .     5 - Technically difficult study.           This document has been electronically    SIGNED BY: Min Reis MD On: 04/20/2018 18:27    This document was originally electronically signed on: 04/20/2018 18:16    and Transthoracic echo (TTE) complete (Cupid Only):   Results for orders placed or performed during the hospital encounter of 03/30/22   Echo   Result Value Ref Range    Ascending aorta 2.86 cm    STJ 3.23 cm    AV mean gradient 4 mmHg    Ao peak sheldon 1.46 m/s    Ao VTI 34.44 cm    IVS 1.00 0.6 - 1.1 cm    LA size 5.88 cm     "Left Atrium Major Axis 6.23 cm    Left Atrium Minor Axis 6.64 cm    LVIDd 5.77 3.5 - 6.0 cm    LVIDs 4.57 (A) 2.1 - 4.0 cm    LVOT diameter 2.04 cm    LVOT peak VTI 17.05 cm    Posterior Wall 1.00 0.6 - 1.1 cm    MV Peak A Colten 0.64 m/s    E wave deceleration time 189.32 msec    MV Peak E Colten 0.90 m/s    PV Peak D Colten 0.24 m/s    PV Peak S Colten 0.34 m/s    RA Major Axis 5.20 cm    RA Width 4.02 cm    RVDD 4.24 cm    Sinus 3.31 cm    TAPSE 1.84 cm    TDI LATERAL 0.04 m/s    TDI SEPTAL 0.04 m/s    LA WIDTH 3.73 cm    MV stenosis pressure 1/2 time 54.90 ms    LV Diastolic Volume 164.49 mL    LV Systolic Volume 95.95 mL    LVOT peak colten 0.71 m/s    LA volume (mod) 75.03 cm3    MV "A" wave duration 10.56 msec    LV LATERAL E/E' RATIO 22.50 m/s    LV SEPTAL E/E' RATIO 22.50 m/s    FS 21 %    LA volume 119.84 cm3    LV mass 231.06 g    Left Ventricle Relative Wall Thickness 0.35 cm    AV valve area 1.62 cm2    AV Velocity Ratio 0.49     AV index (prosthetic) 0.50     MV valve area p 1/2 method 4.01 cm2    E/A ratio 1.41     Mean e' 0.04 m/s    Pulm vein S/D ratio 1.42     LVOT area 3.3 cm2    LVOT stroke volume 55.70 cm3    AV peak gradient 9 mmHg    E/E' ratio 22.50 m/s    LV Systolic Volume Index 47.3 mL/m2    LV Diastolic Volume Index 81.03 mL/m2    LA Volume Index 59.0 mL/m2    LV Mass Index 114 g/m2    LA Volume Index (Mod) 37.0 mL/m2    BSA 2.11 m2    Right Atrial Pressure (from IVC) 15 mmHg    EF 28 %    Narrative    · The left ventricle is mildly enlarged with mild eccentric hypertrophy   and severely decreased systolic function.  · The estimated ejection fraction is 28%.  · There are segmental left ventricular wall motion abnormalities.  · Grade II left ventricular diastolic dysfunction.  · Severe left atrial enlargement.  · Mild right ventricular enlargement with low normal right ventricular   systolic function.  · Mild right atrial enlargement.  · There is mild aortic valve stenosis.  · Aortic valve area is 1.62 " cm2; peak velocity is 1.46 m/s; mean gradient   is 4 mmHg.  · Mild mitral regurgitation.  · Elevated central venous pressure (15 mmHg).        Assessment and Plan:     * NSTEMI (non-ST elevated myocardial infarction)    - concern for in stent thrombosis/currenct infarction given worsened EF and dynamic trop  - agree with acs   - recommend urgent K shift and repeat labwork  - keep NPO except meds    Anti platelet: ASA 81+ Plavix 75  Allergies: none to contrast  Access: left radial then left fem as secondary if needed   Catheter: Bright     -The risks, benefits & alternatives of the procedure were explained to the patient.    -The risks of coronary angiography include but are not limited to:  Bleeding, infection, heart rhythm abnormalities, allergic reactions, kidney injury, stroke and death.    -Should stenting be indicated, the patient has agreed to dual anti-platelet therapy for 1-consecutive year with a drug-eluting stent and a minimum of 1-month with the use of a bare metal stent.    -The risks of moderate sedation include hypotension, respiratory depression, arrhythmias, bronchospasm, & death.    -Informed consent was obtained & the patient is agreeable to proceed with the procedure.  -This patient was discussed with the attending interventional cardiologist who agrees with the above assessment & plan.        Type 2 diabetes mellitus with diabetic peripheral angiopathy and gangrene, with long-term current use of insulin  Management per primary team    Atrial fibrillation  Continue anticoagulation with heparin. Okay to continue home amio and metoprolol    Anemia in ESRD (end-stage renal disease)  Management per nephro    Acute on chronic diastolic heart failure  Pt overloaded on exam likely 2/2 incomplete dialysis sessions  Management per nephrology    Hyperlipidemia  Continue HI statin        VTE Risk Mitigation (From admission, onward)         Ordered     heparin 25,000 units in dextrose 5% (100 units/ml) IV  bolus from bag - ADDITIONAL PRN BOLUS - 60 units/kg (max bolus 4000 units)  As needed (PRN)        Question:  Heparin Infusion Adjustment (DO NOT MODIFY ANSWER)  Answer:  [email or web address]\epic\Images\Pharmacy\HeparinInfusions\heparin LOW INTENSITY nomogram for [facility] [id number]    03/30/22 1925     heparin 25,000 units in dextrose 5% (100 units/ml) IV bolus from bag - ADDITIONAL PRN BOLUS - 30 units/kg (max bolus 4000 units)  As needed (PRN)        Question:  Heparin Infusion Adjustment (DO NOT MODIFY ANSWER)  Answer:  [email or web address]\epic\Images\Pharmacy\HeparinInfusions\heparin LOW INTENSITY nomogram for [facility] [id number]    03/30/22 1925     heparin 25,000 units in dextrose 5% 250 mL (100 units/mL) infusion LOW INTENSITY nomogram - [facility]  Continuous        Question Answer Comment   Heparin Infusion Adjustment (DO NOT MODIFY ANSWER) \\[email or web address]\epic\Images\Pharmacy\HeparinInfusions\heparin LOW INTENSITY nomogram for [facility] [id number]    Begin at (in units/kg/hr) 12        03/30/22 1925     IP VTE HIGH RISK PATIENT  Once         03/30/22 1922     Place sequential compression device  Until discontinued         03/30/22 1922                Thank you for your consult. I will follow-up with patient. Please contact us if you have any additional questions.    Teresita Barney MD  Cardiology   [facility]

## 2022-03-31 NOTE — ED NOTES
hospitalist at bedside speaking with pt. Per md, pt ok to have PO meds at this time and start heparin gtt while being dialyzed. Per md, pt can eat, and make pt NPO after midnight

## 2022-03-31 NOTE — CONSULTS
See same date note by myself.    Staff:  I have personally taken the history and examined this patient and agree with the fellow's note as stated above and amended it accordingly :-)

## 2022-03-31 NOTE — TELEPHONE ENCOUNTER
----- Message from Jenae Tapia sent at 3/31/2022 10:37 AM CDT -----  Contact: Pt- [phone]  Patient would like to get medical advice.    Symptoms (please be specific):  authorization for [employer] machine     Would you like a call back, or a response through your [facility] portal?:   call    Comments:   Pt states [employer] faxed the form a couple ago, but they have not received the completed form back yet. Pt is requesting a call back today.

## 2022-03-31 NOTE — ASSESSMENT & PLAN NOTE
Qtc 519  Likely associated with long-term Amio use  -Okay to continue Amio, but hold all other prolonging agents

## 2022-03-31 NOTE — ASSESSMENT & PLAN NOTE
Takes detemir 4 units BID and aspart 3 units TID with meals at home. Giving detemir 2 units BID and sliding scale aspart.

## 2022-03-31 NOTE — PLAN OF CARE
The patient was referred to us for coronary angiography and possible intervention but ate a complete meal.  Will offer this tomorrow when the lab can acomodate.  She will need DAP Rx for at least a year and her platelet response to plavix (AKA PRU) must be under 211 for her to be plavix responsive.  If not, she needs to be on either Effient (cheap, once a day) or Brilinta (expensive, twice a day) for at leat a year.    Of note, recently published by Sophie et al our plavix resistance rate at [facility] is over 50%.  Stent thombosis caries a risk of 80% of a bad outcome, either large MI or death.

## 2022-03-31 NOTE — H&P (VIEW-ONLY)
"[facility]  Cardiology  Consult Note    Patient Name: Kylie King  MRN: [id number]  Admission Date: 3/30/2022  Hospital Length of Stay: 1 days  Code Status: Full Code   Attending Provider: Enoc Greco MD   Consulting Provider: Teresita Barney MD  Primary Care Physician: Virginia Foote MD  Principal Problem:NSTEMI (non-ST elevated myocardial infarction)    Patient information was obtained from patient and ER records.     Inpatient consult to Interventional Cardiology  Consult performed by: Teresita Barney MD  Consult ordered by: Enoc Greco MD        Subjective:         HPI:      66 y.o. year old female with PMHx significant for CAD s/p PCI on 02/08/2022 (2.70J61MP stent X2, "V stenting on plavix only), left breast cancer s/p mastectomy, chronic respiratory failure (on home 2L of O2), COPD, ESRD on HD TTS w/ anemia of chronic disease, Atrial Fib (failed DCCV in Nov 2021); on eliquis and amiodarone, CVA, PVD (s/p left foot amputation on Plavix), HTN, T2DM with history of osteomyelitis, HLD admitted to observation for shortness of breath 2/2 volume overload.      Interval History:  She had a recent hospital admission on 2/4/22 with a cc of dyspnea admitted with NSTEMI s/p PCI to LAD and ostial Lcx (2.61M96ZL stent X2, "V stenting") . Cath note from that hospitalization recommended discharge on triple therapy for 6 mos ( end date 8/22) however she reports she was instructed to continue triple therapy for one month only and stopped ASA one week ago . In addition, she has had history of hyperkalemia ( up to 8.4) with another recent admission requiring shifting (3/7/22). Most recent labwork showing k 5.0. Of note, prior notes with history of medication non adherence. She was seen by myself yesterday in clinic after she had a fall whenn transitiong from sitting to standing and cut her last HD session short yesterday only going for 1.5 hours (due to diarrhea) when she usually goes for 5 " "hours. Her reported dry weight is 99kgs and today also reports worsening orthopnea and was wokened up from bed with SOB at 4 am. Of note, she is anuri and very sensitive to missed or shortened dialysis sessions and has had several recent hospitalizations from volume/ electrolytes issues. In regards to her ongoing diarrhea, unclear cause as she denies recent ABx use and recent C. Diff testing negative. Denies taking laxatives even though listed in med rec.     Hospital course:   She was subsequently admitted to ED for urgent HD however noted to have NSTEMI and admitted to hospital medicine. Trop notable for rise 4--> 25 with EKG showng IVCD/LBBB and TTE with worsening EF 40%--> 28% and RWMA in LAD and Lcx territory. Per my exam, patient asymptomatic however she was also asymptomatic with last NSTEMI presentation. She was loaded with ASA and plavix and on hep gtt however labwork notable for persistent hyperkalemia K 5.7 Hg 12 Plt 168. She is scheduled for HD today. IC has been consulted for repeat LHC.      LHC 2/8/22  1. Successful treatment of a heavily calcified ostial left circumflex stenosis which cause admission for unstable angina.  2. "V"stenting of the LAD and circumflex ostium was accomplished.  High-pressure balloon inflations were used.    Summary:  1. Successful treatment of critical ostial left circumflex disease by doing V stenting of the ostial left main and left circumflex.  Distal diagonal lesion left untreated.  2. End-stage renal disease on hemodialysis  3. Chronic will send area was unstable angina and non-STEMI.  Recommendations:  1. Dual antiplatelet therapy for 6-12 months  2. Postop care  3. follow-up in General Cardiology and with her PCP and dialysis physicians.    TTE 3/31/22  · The left ventricle is mildly enlarged with mild eccentric hypertrophy and severely decreased systolic function.  · The estimated ejection fraction is 28%.  · There are segmental left ventricular wall motion " "abnormalities.  · Grade II left ventricular diastolic dysfunction.  · Severe left atrial enlargement.  · Mild right ventricular enlargement with low normal right ventricular systolic function.  · Mild right atrial enlargement.  · There is mild aortic valve stenosis.  · Aortic valve area is 1.62 cm2; peak velocity is 1.46 m/s; mean gradient is 4 mmHg.  · Mild mitral regurgitation.  · Elevated central venous pressure (15 mmHg).     TTE 2/5/22  · Severe left atrial enlargement.  · The left ventricle is normal in size with mildly decreased systolic function.  · The estimated ejection fraction is 40%.  · There are segmental left ventricular wall motion abnormalities: apex, apical septum, mid and apical anterolateral, apical inferior and apical anterior wall.  · Grade II left ventricular diastolic dysfunction.  · Normal right ventricular size with normal right ventricular systolic function.  · There is mild aortic valve stenosis.  · Aortic valve area is 1.78 cm2; peak velocity is 0.71 m/s; mean gradient is 6 mmHg.  · Intermediate central venous pressure (8 mmHg).  · The estimated PA systolic pressure is 19 mmHg.    In the ER, kandyn found to be hypertensive 171/88 but otherwise hemodynamically stable. ECG showing non-specific st-t wave changes. Labs notable for troponin of 3.5, bnp of 4550, potassium of 7, potassium of 9.      [facility] 2/8/22  1. Successful treatment of a heavily calcified ostial left circumflex stenosis which cause admission for unstable angina.  2. "V"stenting of the LAD and circumflex ostium was accomplished.  High-pressure balloon inflations were used.    Summary:  1. Successful treatment of critical ostial left circumflex disease by doing V stenting of the ostial left main and left circumflex.  Distal diagonal lesion left untreated.  2. End-stage renal disease on hemodialysis  3. Chronic will send area was unstable angina and non-STEMI.  Recommendations:  1. Dual antiplatelet therapy for 6-12 months  2. " Postop care  3. follow-up in General Cardiology and with her PCP and dialysis physicians.    Diagnostic 2/7/22      PCI 2/8/22    TTE 2/5/22  ·           Severe left atrial enlargement.  ·           The left ventricle is normal in size with mildly decreased systolic function.  ·           The estimated ejection fraction is 40%.  ·           There are segmental left ventricular wall motion abnormalities: apex, apical septum, mid and apical anterolateral, apical inferior and apical anterior wall.  ·           Grade II left ventricular diastolic dysfunction.  ·           Normal right ventricular size with normal right ventricular systolic function.  ·           There is mild aortic valve stenosis.  ·           Aortic valve area is 1.78 cm2; peak velocity is 0.71 m/s; mean gradient is 6 mmHg.  ·           Intermediate central venous pressure (8 mmHg).  ·           The estimated PA systolic pressure is 19 mmHg.             ROS see HPI   Objective:     Vital Signs (Most Recent):  Temp: 97.8 °F (36.6 °C) (03/31/22 1113)  Pulse: 65 (03/31/22 1120)  Resp: 20 (03/31/22 1113)  BP: (!) 143/73 (03/31/22 1113)  SpO2: 97 % (03/31/22 1113)   Vital Signs (24h Range):  Temp:  [97.7 °F (36.5 °C)-98.4 °F (36.9 °C)] 97.8 °F (36.6 °C)  Pulse:  [] 65  Resp:  [12-30] 20  SpO2:  [94 %-100 %] 97 %  BP: (109-197)/(58-88) 143/73     Weight: 98.4 kg (217 lb)  Body mass index is 37.25 kg/m².     SpO2: 97 %  O2 Device (Oxygen Therapy): nasal cannula      Intake/Output Summary (Last 24 hours) at 3/31/2022 1140  Last data filed at 3/30/2022 2251  Gross per 24 hour   Intake 500 ml   Output 3500 ml   Net -3000 ml       Lines/Drains/Airways       Drain  Duration                  Hemodialysis AV Fistula 02/17/22 Right forearm 42 days              Peripheral Intravenous Line  Duration                  Peripheral IV - Single Lumen 03/30/22 1657 20 G Left Antecubital <1 day                    Physical Exam  Constitutional: No distress, obese,  conversant  HEENT: Sclera anicteric, PERRLA, EOMI  Neck:o masses, good movement  CV: RRR, S1 and S2 normal, no additional heart sounds or murmurs. Pulses 2+ and equal bilaterally in radial arteries, Jaime's normal on right. Distal pulses are 2+ and equal in the femoral, DP and PT areas bilaterally  Pulm: + crackles  GI: Abdomen soft, non-tender, good bowel sounds  Extremities: Both extremities intact and grossly normal, skin is warm,  + venous stasis changes, amputation of left LE, AVF + hum of right UE   Skin: No ecchymosis, erythema, or ulcers  Psych: AOx3, appropriate affect  Neuro: CNII-XII intact, no focal deficits    Significant Labs: All pertinent lab results from the last 24 hours have been reviewed.    Significant Imaging: Echocardiogram: 2D echo with color flow doppler:   Results for orders placed or performed during the hospital encounter of 04/19/18   2D echo with color flow doppler   Result Value Ref Range    EF + QEF 51 55 - 65    Diastolic Dysfunction No     Narrative    Date of Procedure: 04/20/2018        TEST DESCRIPTION   Technical Quality: This is a technically challenging study.     Aorta: The aortic root is normal in size, measuring 2.5 cm at sinotubular junction.     Left Atrium: The left atrial volume index is normal, measuring 25.32 cc/m2.     Left Ventricle: The left ventricle is normal in size, with an end-diastolic diameter of 4.7 cm, and an end-systolic diameter of 3.5 cm. Posterior wall thickness is mildly increased, with the septum measuring 1.1 cm and the posterior wall measuring 1.4 cm   across. Relative wall thickness was increased at 0.60, and the LV mass index was increased at 111.6 g/m2 consistent with concentric left ventricular hypertrophy. There are no regional wall motion abnormalities. Left ventricular systolic function appears   low normal to mildly depressed. Visually estimated ejection fraction is 50-55%. The LV Doppler derived stroke volume equals 79.0 ccs.     Diastolic  indices: E/A ratio 1.1,  msec., Diastolic function is abnormal.     Right Atrium: The right atrium is normal in size, measuring 4.6 cm in length and 3.7 cm in width in the apical view.     Right Ventricle: The right ventricle is normal in size. Global right ventricular systolic function appears normal. Tricuspid annular plane systolic excursion (TAPSE) is 2.2 cm.     Aortic Valve:  The aortic valve is not well seen. The mean gradient obtained across the aortic valve is 5 mmHg. Using a left ventricular outflow tract diameter of 1.9 cm, a left ventricular outflow tract velocity time integral of 27 cm, and a peak   instantaneous transvalvular velocity time integral of 35 cm, the calculated aortic valve area is 2.24 cm2(AVAi is 0.94 cm2/m2).     Mitral Valve:  The mitral valve is normal in structure. The pressure half time is 72 msec. The calculated mitral valve area is 3.06 cm2.     Tricuspid Valve:  The tricuspid valve is normal in structure.     Pulmonary Valve:  The pulmonic valve is not well seen.     IVC: The IVC is not visualized.     Intracavitary: There is no evidence of pericardial effusion, intracavity mass, thrombi, or vegetation.         CONCLUSIONS     1 - Concentric hypertrophy.     2 - Low normal to mildly depressed left ventricular systolic function (EF 50-55%).     3 - Left ventricular diastolic dysfunction.     4 - Normal right ventricular systolic function .     5 - Technically difficult study.           This document has been electronically    SIGNED BY: Mni Reis MD On: 04/20/2018 18:27    This document was originally electronically signed on: 04/20/2018 18:16    and Transthoracic echo (TTE) complete (Cupid Only):   Results for orders placed or performed during the hospital encounter of 03/30/22   Echo   Result Value Ref Range    Ascending aorta 2.86 cm    STJ 3.23 cm    AV mean gradient 4 mmHg    Ao peak sheldon 1.46 m/s    Ao VTI 34.44 cm    IVS 1.00 0.6 - 1.1 cm    LA size 5.88 cm     "Left Atrium Major Axis 6.23 cm    Left Atrium Minor Axis 6.64 cm    LVIDd 5.77 3.5 - 6.0 cm    LVIDs 4.57 (A) 2.1 - 4.0 cm    LVOT diameter 2.04 cm    LVOT peak VTI 17.05 cm    Posterior Wall 1.00 0.6 - 1.1 cm    MV Peak A Colten 0.64 m/s    E wave deceleration time 189.32 msec    MV Peak E Colten 0.90 m/s    PV Peak D Colten 0.24 m/s    PV Peak S Colten 0.34 m/s    RA Major Axis 5.20 cm    RA Width 4.02 cm    RVDD 4.24 cm    Sinus 3.31 cm    TAPSE 1.84 cm    TDI LATERAL 0.04 m/s    TDI SEPTAL 0.04 m/s    LA WIDTH 3.73 cm    MV stenosis pressure 1/2 time 54.90 ms    LV Diastolic Volume 164.49 mL    LV Systolic Volume 95.95 mL    LVOT peak colten 0.71 m/s    LA volume (mod) 75.03 cm3    MV "A" wave duration 10.56 msec    LV LATERAL E/E' RATIO 22.50 m/s    LV SEPTAL E/E' RATIO 22.50 m/s    FS 21 %    LA volume 119.84 cm3    LV mass 231.06 g    Left Ventricle Relative Wall Thickness 0.35 cm    AV valve area 1.62 cm2    AV Velocity Ratio 0.49     AV index (prosthetic) 0.50     MV valve area p 1/2 method 4.01 cm2    E/A ratio 1.41     Mean e' 0.04 m/s    Pulm vein S/D ratio 1.42     LVOT area 3.3 cm2    LVOT stroke volume 55.70 cm3    AV peak gradient 9 mmHg    E/E' ratio 22.50 m/s    LV Systolic Volume Index 47.3 mL/m2    LV Diastolic Volume Index 81.03 mL/m2    LA Volume Index 59.0 mL/m2    LV Mass Index 114 g/m2    LA Volume Index (Mod) 37.0 mL/m2    BSA 2.11 m2    Right Atrial Pressure (from IVC) 15 mmHg    EF 28 %    Narrative    · The left ventricle is mildly enlarged with mild eccentric hypertrophy   and severely decreased systolic function.  · The estimated ejection fraction is 28%.  · There are segmental left ventricular wall motion abnormalities.  · Grade II left ventricular diastolic dysfunction.  · Severe left atrial enlargement.  · Mild right ventricular enlargement with low normal right ventricular   systolic function.  · Mild right atrial enlargement.  · There is mild aortic valve stenosis.  · Aortic valve area is 1.62 " cm2; peak velocity is 1.46 m/s; mean gradient   is 4 mmHg.  · Mild mitral regurgitation.  · Elevated central venous pressure (15 mmHg).        Assessment and Plan:     * NSTEMI (non-ST elevated myocardial infarction)    - concern for in stent thrombosis/currenct infarction given worsened EF and dynamic trop  - agree with acs   - recommend urgent K shift and repeat labwork  - keep NPO except meds    Anti platelet: ASA 81+ Plavix 75  Allergies: none to contrast  Access: left radial then left fem as secondary if needed   Catheter: Bright     -The risks, benefits & alternatives of the procedure were explained to the patient.    -The risks of coronary angiography include but are not limited to:  Bleeding, infection, heart rhythm abnormalities, allergic reactions, kidney injury, stroke and death.    -Should stenting be indicated, the patient has agreed to dual anti-platelet therapy for 1-consecutive year with a drug-eluting stent and a minimum of 1-month with the use of a bare metal stent.    -The risks of moderate sedation include hypotension, respiratory depression, arrhythmias, bronchospasm, & death.    -Informed consent was obtained & the patient is agreeable to proceed with the procedure.  -This patient was discussed with the attending interventional cardiologist who agrees with the above assessment & plan.        Type 2 diabetes mellitus with diabetic peripheral angiopathy and gangrene, with long-term current use of insulin  Management per primary team    Atrial fibrillation  Continue anticoagulation with heparin. Okay to continue home amio and metoprolol    Anemia in ESRD (end-stage renal disease)  Management per nephro    Acute on chronic diastolic heart failure  Pt overloaded on exam likely 2/2 incomplete dialysis sessions  Management per nephrology    Hyperlipidemia  Continue HI statin        VTE Risk Mitigation (From admission, onward)         Ordered     heparin 25,000 units in dextrose 5% (100 units/ml) IV  bolus from bag - ADDITIONAL PRN BOLUS - 60 units/kg (max bolus 4000 units)  As needed (PRN)        Question:  Heparin Infusion Adjustment (DO NOT MODIFY ANSWER)  Answer:  \\[email or web address]\epic\Images\Pharmacy\HeparinInfusions\heparin LOW INTENSITY nomogram for [facility] [id number].pdf    03/30/22 1925     heparin 25,000 units in dextrose 5% (100 units/ml) IV bolus from bag - ADDITIONAL PRN BOLUS - 30 units/kg (max bolus 4000 units)  As needed (PRN)        Question:  Heparin Infusion Adjustment (DO NOT MODIFY ANSWER)  Answer:  \\[email or web address]\epic\Images\Pharmacy\HeparinInfusions\heparin LOW INTENSITY nomogram for [facility] [id number]    03/30/22 1925     heparin 25,000 units in dextrose 5% 250 mL (100 units/mL) infusion LOW INTENSITY nomogram - [facility]  Continuous        Question Answer Comment   Heparin Infusion Adjustment (DO NOT MODIFY ANSWER) \\[email or web address]\epic\Images\Pharmacy\HeparinInfusions\heparin LOW INTENSITY nomogram for [facility] [id number].pdf    Begin at (in units/kg/hr) 12        03/30/22 1925     IP VTE HIGH RISK PATIENT  Once         03/30/22 1922     Place sequential compression device  Until discontinued         03/30/22 1922                Thank you for your consult. I will follow-up with patient. Please contact us if you have any additional questions.    Teresita Barney MD  Cardiology   [facility] Step[facility]

## 2022-03-31 NOTE — CARE UPDATE
RAPID RESPONSE NURSE ROUND       Rounding completed with charge RNToña. No concerns verbalized at this time. Instructed to call [phone] for further concerns or assistance.

## 2022-03-31 NOTE — ED NOTES
Report received from RINKU Barrera. Care of pt assumed. Pt aaox4, neuro intact. resp even and unlabored. Nadn. Pt states that she came in for SOb, but that her SOB is much better after receiving a breathing tx. Pt currently in 2L NC. Pt o2 dependent at home on 2l NC. Dialysis nurse at bedside for HD. Pt vss. Nadn. Call light within reach

## 2022-03-31 NOTE — PLAN OF CARE
CM MET WITH THE PT TO DISCUSS DISCHARGE PLANNING PT STATES THAT SHE LIVES ALONE IN A 1 STORY HOUSE SHE IS ON  DIALYSIS AND TAKES ELIQUIS AND PLAVIX SHE WILL NEED A RIDE HOME   DIALYSIS CENTER [facility] [facility]  PT IS CURRENT WITH [facility]   PHARMACY [facility] [address]   PCP FLOYD RODRÍGUEZ LAST APPT 03/22  POA/[facility]   [facility] - [facility]iology Stepdown  Initial Discharge Assessment       Primary Care Provider: Floyd Rodríguez MD    Admission Diagnosis: Hyperkalemia [E87.5]  SOB (shortness of breath) [R06.02]  NSTEMI (non-ST elevated myocardial infarction) [I21.4]  Chronic respiratory failure with hypoxia [J96.11]  Chest pain [R07.9]  ESRD on hemodialysis [N18.6, Z99.2]    Admission Date: 3/30/2022  Expected Discharge Date:     Discharge Barriers Identified: Transportation    Payor: [employer] MANAGED MEDICARE / Plan: [employer] MEDICARE PLAN PPO / Product Type: Medicare Advantage /     Extended Emergency Contact Information  Primary Emergency Contact: Charan Clark  Address: [address] [place] [address] [place]  Mobile Phone: [phone]  Relation: Son  Secondary Emergency Contact: Grey Clark   [place]  Mobile Phone: [phone]  Relation: Spouse    Discharge Plan A: Home, Home Health  Discharge Plan B: Home, Home Health      [facility]  [place] - [address] [place]) [place] [address]  Phone: [phone] Fax: [phone]      Initial Assessment (most recent)     Adult Discharge Assessment - 03/31/22 0958        Discharge Assessment    Assessment Type Discharge Planning Assessment     Confirmed/corrected address, phone number and insurance Yes     Confirmed Demographics Correct on Facesheet     Source of Information patient     When was your last doctors appointment? 03/15/22     Communicated UMAIR with patient/caregiver Date not available/Unable to  determine     Lives With alone     Do you expect to return to your current living situation? Yes     Do you have help at home or someone to help you manage your care at home? No     Prior to hospitilization cognitive status: No Deficits     Current cognitive status: No Deficits     Walking or Climbing Stairs Difficulty ambulation difficulty, requires equipment     Mobility Management WALKER CANE WHELLCHAIR     Dressing/Bathing Difficulty none     Home Layout Able to live on 1st floor     Equipment Currently Used at Home walker, rolling;wheelchair;cane, straight     Readmission within 30 days? Yes     Patient currently being followed by outpatient case management? No     Do you currently have service(s) that help you manage your care at home? No     Do you take prescription medications? Yes     Do you have prescription coverage? Yes     Do you have any problems affording any of your prescribed medications? No     Is the patient taking medications as prescribed? yes     Who is going to help you get home at discharge? PATIENT WILL NEED A RIDE HOME     How do you get to doctors appointments? health plan transportation     Are you on dialysis? Yes     Do you take coumadin? No   ELIQUIS PLAVIX    Discharge Plan A Home;Home Health     Discharge Plan B Home;Home Health     DME Needed Upon Discharge  none     Discharge Plan discussed with: Patient     Discharge Barriers Identified Transportation

## 2022-03-31 NOTE — ED NOTES
Pt has approx 30 mins left of HD. Pt lying in bed, states that she is unable to get comfortable because the bed is hard. Pt voiced no other complaints/concerns. Pt states that her SOB is better. resp even and unlabored.

## 2022-03-31 NOTE — H&P
[facility] - Emergency Dept  Hospital Medicine  History & Physical    Patient Name: Kylie King  MRN: [id number]  Patient Class: IP- Inpatient  Admission Date: 3/30/2022  Attending Physician: Willie Small*   Primary Care Provider: Virginia Foote MD         Patient information was obtained from patient, past medical records and ER records.     Subjective:     Principal Problem:NSTEMI (non-ST elevated myocardial infarction)    Chief Complaint:   Chief Complaint   Patient presents with    Shortness of Breath     Sent from cards for admission, last dialysis yest, diarrhea, had stent placed 3 weeks ago        HPI:   Kylie King is a 66 y.o. female who  has a past medical history of Breast cancer (s/p rad/chemo), History of Colon cancer, CAD s/p PCI on 02/08/2022 (on DAPT), Cataract, Choledocholithiasis, HFrEF (40%), COPD, Chronic venous insufficiency, Anxiety w Depression, ESRD (TThS), GERD, History of kidney stones, History of osteomyelitis of left foot, History of pancreatitis, History of stroke, Hyperlipidemia, Hypertension, Intracerebral hemorrhage, Lumbar degenerative disc disease, Lumbar spinal stenosis, Morbid obesity, Paroxysmal atrial fibrillation, Cerebral Vascular Hemorrhage, PAD (s/p left foot amputation on Plavix), Peripheral neuropathy, Pubic ramus fracture, Tobacco dependence, DM2, and Urinary incontinence, presented to the ED with after presenting to Cardiac clinica and noticed to be volume overloaded on exam and SOBOE. Patient only received 1.5 hours of dialysis this past run. On admit her K was 7.4 and and BNP >4500, therefore, urgent HD performed in ED. In addition her initial troponin was 3.5, with repeat nearly 5. Denies any CP throughout, but diabetic. ACS protocol was initiated. Cardiology was consulted. Of note, she is anuric and very sensitive to missed or shortened dialysis sessions and has had several recent hospitalizations from volume/ electrolytes issues. In regards to  her ongoing diarrhea, unclear cause as she denies recent ABx use and recent C. Diff testing negative. Had recent C 2/8/22: V-stenting of the LAD and circumflex ostium was accomplished. Patient denies any fevers, night sweats, n/v/d/c, abd pain, dysuria, hematuria or hematochezia, cough, wheezing, SOB, CP, leg swelling, HA, dizziness, weakness, hallucinations, SI, HI, or self injury at this time.      Past Medical History:   Diagnosis Date    Anxiety     Breast cancer     left    Carotid artery disease     Cataract     Choledocholithiasis     s/p ERCP and stent placement    Chronic diastolic CHF (congestive heart failure)     Chronic obstructive pulmonary disease     Chronic venous insufficiency     Depression     End-stage renal disease on hemodialysis     M/W/F    GERD (gastroesophageal reflux disease)     History of breast cancer     History of colon cancer 2001    s/p sigmoid colectomy    History of kidney stones     History of osteomyelitis of left foot     History of pancreatitis     History of stroke     Hyperlipidemia     Hypertension     Intracerebral hemorrhage     Lumbar degenerative disc disease     Lumbar spinal stenosis     Morbid obesity     Paroxysmal atrial fibrillation     Peripheral artery disease     Peripheral neuropathy     Pubic ramus fracture 2/17/2022    Tobacco dependence     Type II diabetes mellitus     Urinary incontinence        Past Surgical History:   Procedure Laterality Date    ANGIOGRAM, CORONARY, WITH LEFT HEART CATHETERIZATION N/A 2/7/2022    Procedure: Angiogram, Coronary, with Left Heart Cath;  Surgeon: Adam Rutherford MD;  Location: [facility] CATH LAB;  Service: Cardiology;  Laterality: N/A;    ANGIOGRAPHY OF LOWER EXTREMITY Right 9/17/2020    Procedure: Angiogram Extremity Unilateral;  Surgeon: RICK Pollard III, MD;  Location: [facility] OR [address] [place] [address];  Service: Peripheral Vascular;  Laterality: Right;  6.2 minutes of fluro  690.88 mGy  112.64 Gycm2  55  ml    ANGIOGRAPHY OF LOWER EXTREMITY Left 5/13/2021    Procedure: Angiogram Extremity Unilateral;  Surgeon: HOMERO Hayden II, MD;  [facility];  Service: Vascular;  Laterality: Left;  35.1 min  971.41 mGy  190.27 Gy.cm  101ml Dye    ANGIOGRAPHY OF LOWER EXTREMITY Right 7/19/2021    Procedure: Angiogram Extremity Unilateral;  Surgeon: HOMERO Hayden II, MD;  [facility];  Service: Vascular;  Laterality: Right;  3.0 min  121.00 mGy  26.8713 Gy.cm  13ml Dye    AORTOGRAPHY N/A 5/13/2021    Procedure: AORTOGRAM;  Surgeon: HOMERO Hayden II, MD;  [facility];  Service: Vascular;  Laterality: N/A;    AV FISTULA PLACEMENT Right 5/28/2018    Procedure: CREATION -FISTULA-AV;  Surgeon: RICK Pollard III, MD;  [facility];  Service: Peripheral Vascular;  Laterality: Right;    BREAST BIOPSY  1/2012    left breast invasive mammary carcinoma (lateral bx) and intraductal papilloma (medial bx)    CARDIOVERSION  11/22/2021    CARDIOVERSION  11/22/2021    Procedure: Cardioversion;  Surgeon: Ad Garcia MD;  Location: [facility] CATH LAB;  Service: Cardiology;;    CATARACT EXTRACTION W/  INTRAOCULAR LENS IMPLANT Right 1/24/2019        CATARACT EXTRACTION W/  INTRAOCULAR LENS IMPLANT Left 1/31/2019    Procedure: EXTRACTION, CATARACT, WITH IOL INSERTION;  Surgeon: Immanuel Moncada MD;  Location: [facility];  Service: Ophthalmology;  Laterality: Left;    CHOLECYSTECTOMY  2001    CORONARY ANGIOGRAPHY N/A 2/8/2022    Procedure: ANGIOGRAM, CORONARY ARTERY;  Surgeon: Abelardo Betancur MD;  Location: [facility] CATH LAB;  Service: Cardiology;  Laterality: N/A;    DEBRIDEMENT OF FOOT Right 2/17/2021    Procedure: DEBRIDEMENT, FOOT right plantar ulcer;  Surgeon: Sonja Corral DPM;  Location: [facility] OR;  Service: Podiatry;  Laterality: Right;    ERCP W/ SPHICTEROTOMY      FINE NEEDLE ASPIRATION  1999    Right breast    FOOT AMPUTATION Left 6/1/2021    Procedure:  Transmetatarsal amputation;  Surgeon: Billy Robles DPM;  Location: [address];  Service: Podiatry;  Laterality: Left;    FOOT AMPUTATION Left 7/23/2021    Procedure: AMPUTATION, FOOT;  Surgeon: Billy Robles DPM;  Location: [address];  Service: Podiatry;  Laterality: Left;    FOOT AMPUTATION THROUGH METATARSAL Right 10/15/2020    Procedure: PARTIAL RAY AMPUTATION GREAT TOE;  Surgeon: Billy Robles DPM;  Location: [address];  Service: Podiatry;  Laterality: Right;  Stretcher OK    HERNIA REPAIR      LAPAROSCOPIC NEPHRECTOMY Left 2011    MASTECTOMY  2013    left    OOPHORECTOMY Left 2001    REVISION OF ARTERIOVENOUS FISTULA Right 8/15/2018    Procedure: REVISION, AV FISTULA;  Surgeon: RICK Pollard III, MD;  Location: [address];  Service: Peripheral Vascular;  Laterality: Right;    SIGMOIDECTOMY  2001    SURGICAL REMOVAL OF METATARSAL HEAD Right 2/17/2021    Procedure: OSTECTOMY, METATARSAL BONE, diatal 1st;  Surgeon: Sonja Corral DPM;  Location: [facility];  Service: Podiatry;  Laterality: Right;    TOE AMPUTATION Left 5/13/2021    Procedure: AMPUTATION, TOE;  Surgeon: HOMERO Hayden II, MD;  Location: [address];  Service: Vascular;  Laterality: Left;    TOTAL REDUCTION MAMMOPLASTY Right 2013       Review of patient's allergies indicates:   Allergen Reactions    Cyclobenzaprine Hallucinations    Erythromycin      Stomach upset    Keflex [cephalexin]      Yeast infection    Erythromycin base      Other reaction(s): upset stomach       No current facility-administered medications on file prior to encounter.     Current Outpatient Medications on File Prior to Encounter   Medication Sig    acetaminophen-codeine 300-30mg (TYLENOL #3) 300-30 mg Tab Take 1 tablet by mouth every 8 (eight) hours as needed.    albuterol (PROVENTIL/VENTOLIN HFA) 90 mcg/actuation inhaler Inhale 2 puffs into the lungs every 6 (six) hours as needed for Wheezing. Rescue    amiodarone  (PACERONE) 200 MG Tab Take 1 tablet (200 mg total) by mouth once daily.    apixaban (ELIQUIS) 2.5 mg Tab Take 1 tablet (2.5 mg total) by mouth 2 (two) times daily.    atorvastatin (LIPITOR) 40 MG tablet Take 1 tablet (40 mg total) by mouth once daily.    blood sugar diagnostic Strp 1 strip by Misc.(Non-Drug; Combo Route) route 4 (four) times daily.    blood-glucose sensor (DEXCOM G6 SENSOR) Umm 1 each by Misc.(Non-Drug; Combo Route) route every 10 days.    blood-glucose transmitter (DEXCOM G6 TRANSMITTER) Umm 1 Device by Misc.(Non-Drug; Combo Route) route continuous.    clopidogreL (PLAVIX) 75 mg tablet Take 1 tablet (75 mg total) by mouth once daily.    fish oil-omega-3 fatty acids 300-1,000 mg capsule Take 1 capsule by mouth every morning.    flash glucose scanning reader (FREESTYLE LULU 14 DAY READER) Misc Use as directed to check blood sugars    flash glucose scanning reader (FREESTYLE LULU 2 READER) Misc 1 Units by Misc.(Non-Drug; Combo Route) route continuous prn.    flash glucose sensor (FREESTYLE LULU 14 DAY SENSOR) Kit Use as directed to check blood sugars    flash glucose sensor (FREESTYLE LULU 2 SENSOR) Kit 1 Units by Misc.(Non-Drug; Combo Route) route every 14 (fourteen) days.    insulin aspart U-100 (NOVOLOG) 100 unit/mL (3 mL) InPn pen Inject 3 Units into the skin 3 (three) times daily with meals.    insulin detemir U-100 (LEVEMIR FLEXTOUCH) 100 unit/mL (3 mL) SubQ InPn pen Inject 4 Units into the skin 2 (two) times daily.    lancets (ONETOUCH DELICA LANCETS) 33 gauge Misc 1 lancet by Misc.(Non-Drug; Combo Route) route 4 (four) times daily before meals and nightly.    methocarbamoL (ROBAXIN) 500 MG Tab Take 1 tablet (500 mg total) by mouth 3 (three) times daily.    methoxy peg-epoetin beta (MIRCERA INJ) 225 mcg.    metoprolol tartrate (LOPRESSOR) 25 MG tablet Take 1 tablet (25 mg total) by mouth 2 (two) times daily.    nitroGLYCERIN (NITROSTAT) 0.3 MG SL tablet Place 1 tablet  (0.3 mg total) under the tongue every 5 (five) minutes as needed for Chest pain.    [DISCONTINUED] aspirin (ECOTRIN) 81 MG EC tablet Take 1 tablet (81 mg total) by mouth once daily. Take until 3/8/22, then stop unless further instructed by provider.    [DISCONTINUED] senna-docusate 8.6-50 mg (PERICOLACE) 8.6-50 mg per tablet Take 1 tablet by mouth 2 (two) times daily.    [DISCONTINUED] sevelamer carbonate (RENVELA) 800 mg Tab Take 2 tablets (1,600 mg total) by mouth 3 (three) times daily with meals.     Family History       Problem Relation (Age of Onset)    Arthritis Mother    Breast cancer Maternal Grandmother    Cancer Maternal Grandmother, Maternal Aunt    Celiac disease Sister    Diabetes Father    Heart disease Mother, Father, Maternal Grandmother          Tobacco Use    Smoking status: Current Some Day Smoker     Packs/day: 0.50     Years: 28.00     Pack years: 14.00     Types: Cigarettes     Start date: 1990     Last attempt to quit: 2018     Years since quittin.2    Smokeless tobacco: Never Used    Tobacco comment: anxious   Substance and Sexual Activity    Alcohol use: No    Drug use: No    Sexual activity: Not Currently     Partners: Male     Review of Systems   Constitutional:  Positive for activity change and fatigue. Negative for fever.   HENT:  Negative for sore throat and trouble swallowing.    Eyes:  Negative for photophobia and visual disturbance.   Respiratory:  Positive for shortness of breath. Negative for chest tightness.    Cardiovascular:  Negative for chest pain, palpitations and leg swelling.   Gastrointestinal:  Negative for abdominal distention, abdominal pain, blood in stool, constipation, diarrhea, nausea and vomiting.   Endocrine: Negative for polydipsia and polyuria.   Genitourinary:  Positive for difficulty urinating. Negative for dysuria and hematuria.   Musculoskeletal:  Negative for neck pain and neck stiffness.   Skin:  Negative for pallor and rash.    Allergic/Immunologic: Negative for immunocompromised state.   Neurological:  Negative for dizziness, seizures, syncope and facial asymmetry.   Psychiatric/Behavioral:  Negative for agitation, behavioral problems and confusion.    Objective:     Vital Signs (Most Recent):  Temp: 98.1 °F (36.7 °C) (03/30/22 2100)  Pulse: 75 (03/30/22 2230)  Resp: (!) 24 (03/30/22 2230)  BP: (!) 173/67 (03/30/22 2230)  SpO2: 100 % (03/30/22 2230)   Vital Signs (24h Range):  Temp:  [98.1 °F (36.7 °C)-98.4 °F (36.9 °C)] 98.1 °F (36.7 °C)  Pulse:  [] 75  Resp:  [12-30] 24  SpO2:  [95 %-100 %] 100 %  BP: (109-178)/(59-88) 173/67     Weight: 104 kg (229 lb 4.5 oz)  Body mass index is 39.36 kg/m².    Physical Exam  Vitals and nursing note reviewed.   Constitutional:       General: She is not in acute distress.     Appearance: She is well-developed. She is obese. She is ill-appearing. She is not diaphoretic.   HENT:      Head: Normocephalic and atraumatic.      Mouth/Throat:      Mouth: Mucous membranes are moist.      Pharynx: No oropharyngeal exudate.   Eyes:      General: No scleral icterus.     Pupils: Pupils are equal, round, and reactive to light.   Neck:      Thyroid: No thyromegaly.   Cardiovascular:      Rate and Rhythm: Normal rate and regular rhythm.      Heart sounds: Murmur heard.     No friction rub. No gallop.   Pulmonary:      Effort: Pulmonary effort is normal.      Breath sounds: No stridor. Rales present. No wheezing.   Abdominal:      General: There is no distension.      Palpations: Abdomen is soft. There is no mass.      Tenderness: There is no abdominal tenderness. There is no guarding.   Musculoskeletal:         General: Deformity present. Normal range of motion.      Cervical back: Normal range of motion and neck supple. No rigidity.      Right lower leg: Edema present.      Left lower leg: Edema present.      Comments: L Below ankle/midfoot amputation   Lymphadenopathy:      Cervical: No cervical adenopathy.    Skin:     General: Skin is warm and dry.      Capillary Refill: Capillary refill takes less than 2 seconds.      Coloration: Skin is not jaundiced.      Findings: No bruising.   Neurological:      Mental Status: She is alert and oriented to person, place, and time. Mental status is at baseline.      Cranial Nerves: No cranial nerve deficit.   Psychiatric:         Mood and Affect: Mood normal.         Behavior: Behavior normal.         CRANIAL NERVES     CN III, IV, VI   Pupils are equal, round, and reactive to light.         Recent Results (from the past 24 hour(s))   CBC auto differential    Collection Time: 03/30/22  4:56 PM   Result Value Ref Range    WBC 4.91 3.90 - 12.70 K/uL    RBC 4.20 4.00 - 5.40 M/uL    Hemoglobin 12.0 12.0 - 16.0 g/dL    Hematocrit 38.4 37.0 - 48.5 %    MCV 91 82 - 98 fL    MCH 28.6 27.0 - 31.0 pg    MCHC 31.3 (L) 32.0 - 36.0 g/dL    RDW 19.5 (H) 11.5 - 14.5 %    Platelets 168 150 - 450 K/uL    MPV 11.2 9.2 - 12.9 fL    Immature Granulocytes 0.2 0.0 - 0.5 %    Gran # (ANC) 3.9 1.8 - 7.7 K/uL    Immature Grans (Abs) 0.01 0.00 - 0.04 K/uL    Lymph # 0.7 (L) 1.0 - 4.8 K/uL    Mono # 0.3 0.3 - 1.0 K/uL    Eos # 0.0 0.0 - 0.5 K/uL    Baso # 0.03 0.00 - 0.20 K/uL    nRBC 0 0 /100 WBC    Gran % 78.4 (H) 38.0 - 73.0 %    Lymph % 14.7 (L) 18.0 - 48.0 %    Mono % 5.9 4.0 - 15.0 %    Eosinophil % 0.2 0.0 - 8.0 %    Basophil % 0.6 0.0 - 1.9 %    Differential Method Automated    Comprehensive metabolic panel    Collection Time: 03/30/22  4:56 PM   Result Value Ref Range    Sodium 136 136 - 145 mmol/L    Potassium 7.2 (HH) 3.5 - 5.1 mmol/L    Chloride 92 (L) 95 - 110 mmol/L    CO2 25 23 - 29 mmol/L    Glucose 130 (H) 70 - 110 mg/dL    BUN 70 (H) 8 - 23 mg/dL    Creatinine 9.0 (H) 0.5 - 1.4 mg/dL    Calcium 9.2 8.7 - 10.5 mg/dL    Total Protein 7.5 6.0 - 8.4 g/dL    Albumin 3.8 3.5 - 5.2 g/dL    Total Bilirubin 0.9 0.1 - 1.0 mg/dL    Alkaline Phosphatase 98 55 - 135 U/L    AST 36 10 - 40 U/L    ALT 20  10 - 44 U/L    Anion Gap 19 (H) 8 - 16 mmol/L    eGFR if African American 4.7 (A) >60 mL/min/1.73 m^2    eGFR if non  4.1 (A) >60 mL/min/1.73 m^2   Brain natriuretic peptide    Collection Time: 03/30/22  4:56 PM   Result Value Ref Range    BNP 4,554 (H) 0 - 99 pg/mL   Troponin I    Collection Time: 03/30/22  4:56 PM   Result Value Ref Range    Troponin I 3.472 (H) 0.000 - 0.026 ng/mL   Magnesium    Collection Time: 03/30/22  4:56 PM   Result Value Ref Range    Magnesium 2.1 1.6 - 2.6 mg/dL   Phosphorus    Collection Time: 03/30/22  4:56 PM   Result Value Ref Range    Phosphorus 9.1 (HH) 2.7 - 4.5 mg/dL   POCT COVID-19 Rapid Screening    Collection Time: 03/30/22  5:35 PM   Result Value Ref Range    POC Rapid COVID Negative Negative     Acceptable Yes    Troponin I    Collection Time: 03/30/22  7:52 PM   Result Value Ref Range    Troponin I 4.811 (H) 0.000 - 0.026 ng/mL   Protime-INR    Collection Time: 03/30/22  7:52 PM   Result Value Ref Range    Prothrombin Time 11.4 9.0 - 12.5 sec    INR 1.1 0.8 - 1.2   APTT    Collection Time: 03/30/22  7:52 PM   Result Value Ref Range    aPTT 27.6 21.0 - 32.0 sec       Microbiology Results (last 7 days)       ** No results found for the last 168 hours. **             Imaging Results              X-Ray Chest AP Portable (Final result)  Result time 03/30/22 18:11:35      Final result by Duyen Lenz MD (03/30/22 18:11:35)                   Impression:      Diffuse increased interstitial attenuation suggestive for pulmonary edema.  Atypical pneumonia could present with similar appearance in the right clinical setting.      Electronically signed by: Duyen Lenz MD  Date:    03/30/2022  Time:    18:11               Narrative:    EXAMINATION:  XR CHEST AP PORTABLE    TECHNIQUE:  Single frontal view of the chest was performed.    COMPARISON:  03/04/2022.    FINDINGS:  Cardiac silhouette is enlarged but stable in size.  Lungs are symmetrically  expanded.  Diffuse increased interstitial attenuation is seen.  No evidence of new focal consolidation, pneumothorax, or large pleural effusion.  No acute osseous abnormality identified.                                          Assessment/Plan:     * NSTEMI (non-ST elevated myocardial infarction)  Initial trop 3.5, repeat 4.8. No CP.   EKG returned to NSR from AFib previously. LBBB was previously there. Qtc prolonged at 519.    - Patient presents with NSTEMI   - start heparin ACS protocol, and continue for 48 hours  - Continue ASA 81mg daily, start DAPT with loading dose ASA 325mg once  - continue plavix 75 mg daily, start with loading dose clopidogrel 300 mg once  - lipid panel; atorvastatin 80mg daily  - echo to assess LV/RV function  - trend troponins x3 or until peaks  - discussed aggressive risk factor modifications  - monitor on telemetry  - serial ECG and nitro SL as needed for chest pain  - consult cards  - Holding BB and ACEi for now, need BP to get K down from > 7 w HD, restart when able          COPD without exacerbation  Continue HD for fluid removal  Titrate SpO2 to maintain O2 88% given COPD  Duonebs to be prn       Prolonged Q-T interval on ECG  Qtc 519  Likely associated with long-term Amio use  -Okay to continue Amio, but hold all other prolonging agents      Type 2 diabetes mellitus with diabetic peripheral angiopathy and gangrene, with long-term current use of insulin  Home detemir 4U BID and Aspart 3U TID wm  -Will order Detemir 2U BID and SSI for now  -NPO at mn for potential cath        Atrial fibrillation  -Continue Amio  -Hep gtt  -Holding BB for now, need BP to get K down w HD        Anemia in ESRD (end-stage renal disease)  Neph consulted, HD      Hyperkalemia  -Neph consulted for HD      History of colon cancer  S/p partial colectomy  No previous chemo or XRT      End-stage renal disease on hemodialysis  -Nephrology consulted  -HD in ED, for hyperK and Vol overload      Acute on chronic  diastolic heart failure  - Incomplete HD, BNP > 4000 at admit. CXR with pulm edema.   - TTE 02/05/2022  · Severe left atrial enlargement.  · The left ventricle is normal in size with mildly decreased systolic function.  · The estimated ejection fraction is 40%.  · There are segmental left ventricular wall motion abnormalities: apex, apical septum, mid and apical anterolateral, apical inferior and apical anterior wall.  · Grade II left ventricular diastolic dysfunction.  · Normal right ventricular size with normal right ventricular systolic function.  · There is mild aortic valve stenosis.  · Aortic valve area is 1.78 cm2; peak velocity is 0.71 m/s; mean gradient is 6 mmHg.  · Intermediate central venous pressure (8 mmHg).  · The estimated PA systolic pressure is 19 mmHg.  - Fluid removal with HD  - Low salt diet and renal diet   - Fluid restriction of 1500ml daily  - Continue GDMT when safe to do so      History of breast cancer  S/p mastectomy + XRT  No previous chemo  -Clarify if still on letrozole      Hyperlipidemia  Atorv 80 mg daily        VTE Risk Mitigation (From admission, onward)         Ordered     heparin 25,000 units in dextrose 5% (100 units/ml) IV bolus from bag - ADDITIONAL PRN BOLUS - 60 units/kg (max bolus 4000 units)  As needed (PRN)        Question:  Heparin Infusion Adjustment (DO NOT MODIFY ANSWER)  Answer:  [email or web address]\Images\Pharmacy\HeparinInfusions\heparin LOW INTENSITY nomogram for [facility] [id number]    03/30/22 1925     heparin 25,000 units in dextrose 5% (100 units/ml) IV bolus from bag - ADDITIONAL PRN BOLUS - 30 units/kg (max bolus 4000 units)  As needed (PRN)        Question:  Heparin Infusion Adjustment (DO NOT MODIFY ANSWER)  Answer:  \\[email or web address]\Images\Pharmacy\HeparinInfusions\heparin LOW INTENSITY nomogram for [facility] [id number]    03/30/22 1925     heparin 25,000 units in dextrose 5% 250 mL (100 units/mL) infusion LOW INTENSITY nomogram - [facility]  Continuous        Question  Answer Comment   Heparin Infusion Adjustment (DO NOT MODIFY ANSWER) [email or web address]\Images\Pharmacy\HeparinInfusions\heparin LOW INTENSITY nomogram for [facility] [id number]    Begin at (in units/kg/hr) 12        03/30/22 1925     IP VTE HIGH RISK PATIENT  Once         03/30/22 1922     Place sequential compression device  Until discontinued         03/30/22 1922                   Gilles Hogue MD  Department of Hospital Medicine   [facility] - Emergency Dept

## 2022-03-31 NOTE — ASSESSMENT & PLAN NOTE
Concern for in stent thrombosis/currenct infarction given worsened EF and dynamic trop  Anti platelet: ASA 81+ Plavix 75  Allergies: none to contrast  Access: left radial then left fem as secondary if needed   Catheter: Bright   -The risks, benefits & alternatives of the procedure were explained to the patient.    -The risks of coronary angiography include but are not limited to:  Bleeding, infection, heart rhythm abnormalities, allergic reactions, kidney injury, stroke and death.    -Should stenting be indicated, the patient has agreed to dual anti-platelet therapy for 1-consecutive year with a drug-eluting stent and a minimum of 1-month with the use of a bare metal stent.    -The risks of moderate sedation include hypotension, respiratory depression, arrhythmias, bronchospasm, & death.    -Informed consent was obtained & the patient is agreeable to proceed with the procedure.  -This patient was discussed with the attending interventional cardiologist who agrees with the above assessment & plan.

## 2022-03-31 NOTE — ASSESSMENT & PLAN NOTE
- concern for in stent thrombosis/currenct infarction given worsened EF and dynamic trop  - agree with acs   - recommend urgent K shift and repeat labwork  - keep NPO except meds    Anti platelet: ASA 81+ Plavix 75  Allergies: none to contrast  Access: left radial then left fem as secondary if needed   Catheter: Bright     -The risks, benefits & alternatives of the procedure were explained to the patient.    -The risks of coronary angiography include but are not limited to:  Bleeding, infection, heart rhythm abnormalities, allergic reactions, kidney injury, stroke and death.    -Should stenting be indicated, the patient has agreed to dual anti-platelet therapy for 1-consecutive year with a drug-eluting stent and a minimum of 1-month with the use of a bare metal stent.    -The risks of moderate sedation include hypotension, respiratory depression, arrhythmias, bronchospasm, & death.    -Informed consent was obtained & the patient is agreeable to proceed with the procedure.  -This patient was discussed with the attending interventional cardiologist who agrees with the above assessment & plan.

## 2022-03-31 NOTE — SUBJECTIVE & OBJECTIVE
Interval History: NAEO. Patients tarted on ACS protocol and received HD yesterday with 3L removed. Repeat trop markedly elevated. Plan for intervention today per IC. Patient was not on DAPT previously. Though received clopidogrel load yesterday, was not started on QD dosing; start clopidogrel 75mg QD.   Review of Systems   Constitutional: Negative for chills, decreased appetite and fever.   HENT:  Negative for congestion and sore throat.    Eyes:  Negative for blurred vision and discharge.   Cardiovascular:  Negative for chest pain, claudication, cyanosis, dyspnea on exertion, leg swelling and palpitations.   Respiratory:  Negative for cough, hemoptysis, shortness of breath and wheezing.    Endocrine: Negative for cold intolerance and heat intolerance.   Skin:  Negative for color change and rash.   Musculoskeletal:  Negative for arthritis, muscle weakness and myalgias.   Gastrointestinal:  Negative for bloating, abdominal pain, constipation, diarrhea, nausea and vomiting.   Genitourinary:  Negative for dysuria.   Neurological:  Negative for dizziness, focal weakness, headaches and weakness.   Psychiatric/Behavioral:  Negative for altered mental status and depression.    Objective:     Vital Signs (Most Recent):  Temp: 97.8 °F (36.6 °C) (03/31/22 1113)  Pulse: 64 (03/31/22 1235)  Resp: 18 (03/31/22 1235)  BP: (!) 143/73 (03/31/22 1113)  SpO2: 98 % (03/31/22 1235)   Vital Signs (24h Range):  Temp:  [97.7 °F (36.5 °C)-98.4 °F (36.9 °C)] 97.8 °F (36.6 °C)  Pulse:  [] 64  Resp:  [12-30] 18  SpO2:  [94 %-100 %] 98 %  BP: (109-197)/(58-88) 143/73     Weight: 98.4 kg (217 lb)  Body mass index is 37.25 kg/m².     SpO2: 98 %  O2 Device (Oxygen Therapy): nasal cannula      Intake/Output Summary (Last 24 hours) at 3/31/2022 8296  Last data filed at 3/30/2022 2251  Gross per 24 hour   Intake 500 ml   Output 3500 ml   Net -3000 ml       Lines/Drains/Airways       Drain  Duration                  Hemodialysis AV Fistula  02/17/22 Right forearm 42 days              Peripheral Intravenous Line  Duration                  Peripheral IV - Single Lumen 03/30/22 1657 20 G Left Antecubital <1 day                    Physical Exam  Vitals and nursing note reviewed.   Constitutional:       Appearance: Normal appearance. She is obese. She is not ill-appearing.   HENT:      Head: Normocephalic and atraumatic.   Cardiovascular:      Rate and Rhythm: Normal rate and regular rhythm.      Pulses: Normal pulses.      Heart sounds: Normal heart sounds. No murmur heard.    No friction rub. No gallop.   Pulmonary:      Effort: Pulmonary effort is normal. No respiratory distress.      Breath sounds: Rales present. No wheezing.   Abdominal:      General: Abdomen is flat. Bowel sounds are normal. There is no distension.      Palpations: Abdomen is soft.      Tenderness: There is no abdominal tenderness.   Musculoskeletal:      Right lower leg: Edema present.      Left lower leg: Edema present.   Skin:     General: Skin is warm.   Neurological:      General: No focal deficit present.      Mental Status: She is alert and oriented to person, place, and time.   Psychiatric:         Mood and Affect: Mood normal.         Behavior: Behavior normal.       Significant Labs: All pertinent lab results from the last 24 hours have been reviewed.    Significant Imaging:  Reviewed.

## 2022-03-31 NOTE — ASSESSMENT & PLAN NOTE
ESRD-HD  Recent Labs   Lab 03/30/22  1656 03/31/22  0040 03/31/22 0922    136 134*   K 7.2* 4.8 5.7*   CL 92* 96 94*   CO2 25 21* 27   BUN 70* 38* 43*   CREATININE 9.0* 6.0* 6.8*   CALCIUM 9.2 9.2 9.5   PHOS 9.1*  --   --        INPUT/OUTPUT  I/O last 3 completed shifts:  In: 500 [Other:500]  Out: 3500 [Other:3500]  -----------------------------------  No intake/output data recorded.    Dialysis for metabolic clearance and volume management provided today.   Ultrafiltration goal: Liters: 1-2L  Duration: 3 hours  Labs have been reviewed.  Dialysate adjusted to current labs.  -Monitor for complications and adverse events.  -Continue to monitor intake and output.  -Daily weights.  -Pre and Post-HD weights to determine EDW.   -Avoid nephrotoxic medication and renal dose medications to GFR.  -Low NA, K, PO4 diet.  -Will continue to monitoring.     Mineral Bone Disease in CKD   Lab Results   Component Value Date    .0 (H) 07/14/2021    CALCIUM 9.5 03/31/2022    PHOS 9.1 (HH) 03/30/2022       -PO4, Mg and Calcium levels.   -Renal diet with protein intake goal 1.5 g/kg/d.  -Novasource with meals.  -Daily renal panel to monitor phosphate and albumin.  -Continue sevelamer     Anemia of Chronic Kidney Disease   Recent Labs   Lab 03/30/22  1656 03/31/22 0922   WBC 4.91 3.57*   HGB 12.0 11.5*   HCT 38.4 38.0    137*     Lab Results   Component Value Date    IRON 42 11/25/2021    TIBC 268 11/25/2021    FERRITIN 919 (H) 08/05/2021        --Maintain Hb>7.0 g/dL. -Goal in ESRD is Hgb of 10-11.  -Iron studies in AM. -10  -Maintain Hb>7g/dL  -Goal in ESRD is Hgb of 10-11.     Arterial Hypertension    -Normotensive 140   -No lab stick or BP intake on access site.    Hyperlipidemia    -Continue current meds.

## 2022-03-31 NOTE — ASSESSMENT & PLAN NOTE
Home detemir 4U BID and Aspart 3U TID wm  -Will order Detemir 2U BID and SSI for now  -NPO at [place]n for potential cath

## 2022-03-31 NOTE — HOSPITAL COURSE
She was put on heparin infusion in place of her home apixaban. Troponin increased to 24.436 the next morning. Interventional Cardiology was consulted. She was found to be a poor clopidrogel responder.

## 2022-03-31 NOTE — CARE UPDATE
Pt was seen and examined in the ED, needs emergent HD for K 7.2 and volume overload. Orders for HD placed, low BFR d/t rising troponin levels, cardiology has been consulted and primary team at bedside.   Plan for another HD treatment in AM for more volume removal and clearance if needed       Jeri Vasquez M.D.   Nephrology  [facility]

## 2022-03-31 NOTE — PROGRESS NOTES
[facility] - Cardiology [facility] Medicine  Progress Note    Patient Name: Kylie King  MRN: [id number]  Patient Class: IP- Inpatient   Admission Date: 3/30/2022  Length of Stay: 1 days  Attending Physician: Enoc Greco MD  Primary Care Provider: Virginia Foote MD        Subjective:     Principal Problem:NSTEMI (non-ST elevated myocardial infarction)        HPI:  Kylie King is a 66 year old white woman with obesity, chronic obstructive pulmonary disease, coronary artery disease status post V stenting of left anterior descending artery and circumflex ostium on 2/8/2022, chronic systolic and diastolic heart failure, chronic hypoxic respiratory failure (on supplemental oxygen at 2 liters/minute), atrial fibrillation (anticoagulated on apixaban), diabetes mellitus type 2 (treated with insulin), peripheral artery disease, carotid artery stenosis, chronic venous insufficiency, hyperlipidemia, end stage renal disease on hemodialysis (Tuesday Thursday Saturday), secondary hyperparathyroidism, history of breast cancer status post left mastectomy in 2013, radiation, and chemotherapy, history of colon cancer status post sigmoid colectomy in 2001, osteoarthritis, lumbar degenerative disc disease, anxiety, depression, history of intracranial hemorrhage, history of choledocholithiasis status post endoscopic retrograde cholangiopancreatography and biliary stent placement on 2/27/2009, history of cholecystectomy in 2001, history of left nephrectomy on 8/16/2011, history of right partial ray amputation on 10/15/2020, history of left toe amputation on 5/13/2021, left transmetatarsal foot amputation on 6/1/2021, and left foot amputation on 7/23/2021. She is anuric. She lives in the [facility]. Her primary care physician is Dr. Virginia Foote.    She was hospitalized at [facility] from 2/4/2022 to 2/8/2022 for heart failure exacerbation and non-ST elevation myocardial  infarction. She had stent placement.   She was hospitalized again at [facility] from 2/17/2022 to 2/20/2022 for hyperkalemia and encephalopathy.   She was hospitalized again at [facility] from 3/3/2022 to 3/7/2022 for hyperkalemia and hypervolemia.   She presented to [facility] on 3/30/2022 after she was seen in Cardiology clinic and noticed to be volume overloaded on physical exam with shortness of breath on exertion. She had only received 1.5 hours of dialysis during her last session. Her potassium was found to be 7.4 mmol/L and BNP was greater than 4500 pg/mL. She underwent urgent hemodialysis in the emergency department. Troponin was 3.472 ng/mL and repeat troponin was 4.811 so acute coronary syndrome protocol was initiated and Cardiology was consulted. She was admitted to Hospital Medicine Team A.      Overview/Hospital Course:  She was put on heparin infusion in place of her home apixaban. Troponin increased to 24.436 the next morning. Interventional Cardiology was consulted.       Interval History: Interventional Cardiology planning [facility] after treating hyperkalemia.    Review of Systems   Constitutional:  Negative for chills and fever.   Respiratory:  Positive for shortness of breath. Negative for cough.    Neurological:  Negative for seizures and syncope.   Objective:     Vital Signs (Most Recent):  Temp: 97.8 °F (36.6 °C) (03/31/22 1113)  Pulse: 65 (03/31/22 1120)  Resp: 20 (03/31/22 1113)  BP: (!) 143/73 (03/31/22 1113)  SpO2: 97 % (03/31/22 1113)   Vital Signs (24h Range):  Temp:  [97.7 °F (36.5 °C)-98.4 °F (36.9 °C)] 97.8 °F (36.6 °C)  Pulse:  [] 65  Resp:  [12-30] 20  SpO2:  [94 %-100 %] 97 %  BP: (109-197)/(58-88) 143/73     Weight: 98.4 kg (217 lb)  Body mass index is 37.25 kg/m².    Intake/Output Summary (Last 24 hours) at 3/31/2022 1220  Last data filed at 3/30/2022 2251  Gross per 24 hour   Intake 500 ml   Output 3500 ml    Net -3000 ml      Physical Exam  Vitals and nursing note reviewed.   Constitutional:       General: She is not in acute distress.     Appearance: She is obese.   Pulmonary:      Effort: Pulmonary effort is normal. No respiratory distress.   Neurological:      Mental Status: She is alert. Mental status is at baseline.   Psychiatric:         Mood and Affect: Mood and affect normal.           Significant Labs:  CBC:  Recent Labs   Lab 03/30/22  1656 03/31/22  0922   WBC 4.91 3.57*   HGB 12.0 11.5*   HCT 38.4 38.0    137*     CMP:  Recent Labs   Lab 03/30/22  1656 03/31/22  0040 03/31/22  0922    136 134*   K 7.2* 4.8 5.7*   CL 92* 96 94*   CO2 25 21* 27   * 113* 130*   BUN 70* 38* 43*   CREATININE 9.0* 6.0* 6.8*   CALCIUM 9.2 9.2 9.5   PROT 7.5  --  7.2   ALBUMIN 3.8  --  3.5   BILITOT 0.9  --  0.9   ALKPHOS 98  --  83   AST 36  --  55*   ALT 20  --  21   ANIONGAP 19* 19* 13   EGFRNONAA 4.1* 6.7* 5.8*     PTINR:  Recent Labs   Lab 03/30/22 1952   INR 1.1       X-Ray Chest AP Portable 3/30/22: FINDINGS:   Cardiac silhouette is enlarged but stable in size.  Lungs are symmetrically expanded.  Diffuse increased interstitial attenuation is seen.  No evidence of new focal consolidation, pneumothorax, or large pleural effusion.  No acute osseous abnormality identified.   Impression:  Diffuse increased interstitial attenuation suggestive for pulmonary edema.  Atypical pneumonia could present with similar appearance in the right clinical setting.       Assessment/Plan:      * NSTEMI (non-ST elevated myocardial infarction)  Treating. Appreciate Cardiology.    COPD without exacerbation  Titrate SpO2 to maintain O2 88% given COPD. Albuterol-ipratropium prn.    Prolonged Q-T interval on ECG  Qtc 519. Likely associated with long-term amiodarone use.    Type 2 diabetes mellitus with diabetic peripheral angiopathy and gangrene, with long-term current use of insulin  Takes detemir 4 units BID and aspart 3 units  TID with meals at home. Giving detemir 2 units BID and sliding scale aspart.     Atrial fibrillation  Continue home amiodarone, metoprolol. Giving heparin in place of home apixaban.    Anemia in ESRD (end-stage renal disease)  Stable.     Hyperkalemia  Give sodium polystyrene, albuterol, insulin with dextrose.    History of colon cancer  Status post partial colectomy.    End-stage renal disease on hemodialysis  Nephrology following for dialysis.    Acute on chronic combined systolic and diastolic heart failure  Chronic respiratory failure with hypoxia, on home oxygen therapy  Dialysis to remove fluid.    History of breast cancer  Status post mastectomy, radiation, chemotherapy.    Hyperlipidemia  Continue home atorvastatin.      VTE Risk Mitigation (From admission, onward)         Ordered     heparin 25,000 units in dextrose 5% (100 units/ml) IV bolus from bag - ADDITIONAL PRN BOLUS - 60 units/kg (max bolus 4000 units)  As needed (PRN)        Question:  Heparin Infusion Adjustment (DO NOT MODIFY ANSWER)  Answer:  \[email or web address]\Images\Pharmacy\HeparinInfusions\heparin LOW INTENSITY nomogram for [facility] [id number]    03/30/22 1925     heparin 25,000 units in dextrose 5% (100 units/ml) IV bolus from bag - ADDITIONAL PRN BOLUS - 30 units/kg (max bolus 4000 units)  As needed (PRN)        Question:  Heparin Infusion Adjustment (DO NOT MODIFY ANSWER)  Answer:  \[email or web address]\Images\Pharmacy\HeparinInfusions\heparin LOW INTENSITY nomogram for [facility] [id number]    03/30/22 1925     heparin 25,000 units in dextrose 5% 250 mL (100 units/mL) infusion LOW INTENSITY nomogram - [facility]  Continuous        Question Answer Comment   Heparin Infusion Adjustment (DO NOT MODIFY ANSWER) \\[email or web address]\Images\Pharmacy\HeparinInfusions\heparin LOW INTENSITY nomogram for [facility] [id number]    Begin at (in units/kg/hr) 12        03/30/22 1925     IP VTE HIGH RISK PATIENT  Once         03/30/22 1922     Place sequential compression device   Until discontinued         03/30/22 1922                Discharge Planning   UMAIR:      Code Status: Full Code   Is the patient medically ready for discharge?:     Reason for patient still in hospital (select all that apply): Patient unstable and Treatment  Discharge Plan A: Home, Home Health                  Enoc Greco MD  Department of Hospital Medicine   [facility] - Cardiology Stepdown

## 2022-03-31 NOTE — ASSESSMENT & PLAN NOTE
- Incomplete HD, BNP > 4000 at admit. CXR with pulm edema.   - TTE 02/05/2022  · Severe left atrial enlargement.  · The left ventricle is normal in size with mildly decreased systolic function.  · The estimated ejection fraction is 40%.  · There are segmental left ventricular wall motion abnormalities: apex, apical septum, mid and apical anterolateral, apical inferior and apical anterior wall.  · Grade II left ventricular diastolic dysfunction.  · Normal right ventricular size with normal right ventricular systolic function.  · There is mild aortic valve stenosis.  · Aortic valve area is 1.78 cm2; peak velocity is 0.71 m/s; mean gradient is 6 mmHg.  · Intermediate central venous pressure (8 mmHg).  · The estimated PA systolic pressure is 19 mmHg.  - Fluid removal with HD  - Low salt diet and renal diet   - Fluid restriction of 1500ml daily  - Continue GDMT when safe to do so

## 2022-03-31 NOTE — AI DETERIORATION ALERT
"RAPID RESPONSE NURSE AI ALERT       AI alert received.    Chart Reviewed: 03/31/2022, 1:45 AM    MRN: [id number]  Bed: 317/317 A    Dx: NSTEMI (non-ST elevated myocardial infarction)    Kylie King has a past medical history of Anxiety, Breast cancer, Carotid artery disease, Cataract, Choledocholithiasis, Chronic diastolic CHF (congestive heart failure), Chronic obstructive pulmonary disease, Chronic venous insufficiency, Depression, End-stage renal disease on hemodialysis, GERD (gastroesophageal reflux disease), History of breast cancer, History of colon cancer, History of kidney stones, History of osteomyelitis of left foot, History of pancreatitis, History of stroke, Hyperlipidemia, Hypertension, Intracerebral hemorrhage, Lumbar degenerative disc disease, Lumbar spinal stenosis, Morbid obesity, Paroxysmal atrial fibrillation, Peripheral artery disease, Peripheral neuropathy, Pubic ramus fracture, Tobacco dependence, Type II diabetes mellitus, and Urinary incontinence.    Last VS: BP (!) 149/86   Pulse 71   Temp 98.1 °F (36.7 °C)   Resp 20   Ht 5' 4" (1.626 m)   Wt 104 kg (229 lb 4.5 oz)   SpO2 98%   BMI 39.36 kg/m²     24H Vital Sign Range:  Temp:  [98.1 °F (36.7 °C)-98.4 °F (36.9 °C)]   Pulse:  []   Resp:  [12-30]   BP: (109-197)/(59-88)   SpO2:  [95 %-100 %]     Level of Consciousness (AVPU): (P) alert    Recent Labs     03/30/22  1656   WBC 4.91   HGB 12.0   HCT 38.4          Recent Labs     03/30/22  1656      K 7.2*   CL 92*   CO2 25   CREATININE 9.0*   *   PHOS 9.1*   MG 2.1        No results for input(s): PH, PCO2, PO2, HCO3, POCSATURATED, BE in the last 72 hours.     OXYGEN:  Flow (L/min): (P) 2     O2 Device (Oxygen Therapy): nasal cannula    MEWS score: 1    Charge RNAbelardo contacted. No concerns verbalized at this time. Instructed to call [phone] for further concerns or assistance.    Laney Jenkins RN         "

## 2022-03-31 NOTE — PROGRESS NOTES
1:1 bedside HD started via R AVF using 15g needles x2. Lines connected and secure. Orders verified. Net UF goal 3000 mL.    03/30/22 1945 03/30/22 1949   Pre-Hemodialysis Assessment   Dialysate Na (mEq/L) 138  --    Dialysate K (mEq/L) 2  --    Dialysate CA (mEq/L) 2.5  --    Dialysate HCO3 (mEq/L) 35  --    Prime Ordered (mL) 250 mL  --    Treatment Initiation with Dialysis Precautions  --  All connections secured;Saline line double clamped;Venous parameters set;Prime given;Arterial parameters set;Air foam detector engaged   Prime Amount Given (mL) 250 mL  --    Net UF Goal 3000  --    Total UF Goal 3500  --    Pre-Hemodialysis Comments  --  see note   UF Rate 1170  --    RO # / DI #  RO 12  --    RO Rejection Within Limits? Yes  --    DI Lights Green  --         Hemodialysis AV Fistula 02/17/22 Right forearm   Placement Date: 02/17/22   Present Prior to Hospital Arrival?: Yes  Location: Right forearm   Needle Size  --  15ga   Site Assessment  --  Dry;Intact;No redness;No swelling   Patency  --  Present;Thrill;Bruit   Status  --  Accessed   Flows  --  Good

## 2022-03-31 NOTE — ASSESSMENT & PLAN NOTE
Pt overloaded on exam likely 2/2 incomplete dialysis sessions  - Management/dialysis per nephrology

## 2022-03-31 NOTE — CONSULTS
[facility] - Cardiology Stepdown  Nephrology  Consult Note    Patient Name: Kylie King  MRN: [id number]  Admission Date: 3/30/2022  Hospital Length of Stay: 1 days  Attending Provider: Enoc Greco MD   Primary Care Physician: Virginia Foote MD  Principal Problem:NSTEMI (non-ST elevated myocardial infarction)    Inpatient consult to Nephrology  Consult performed by: Cherry Ferreira MD  Consult ordered by: Rajiv Silver PA-C        Subjective:     HPI: Kylie King is a 66 y.o. female who  has a past medical history of Breast cancer (s/p rad/chemo), History of Colon cancer, CAD s/p PCI on 02/08/2022 (on DAPT), Cataract, Choledocholithiasis, HFrEF (40%), COPD, Chronic venous insufficiency, Anxiety w Depression, ESRD (TThS), GERD, History of kidney stones, History of osteomyelitis of left foot, History of pancreatitis, History of stroke, Hyperlipidemia, Hypertension, Intracerebral hemorrhage, Lumbar degenerative disc disease, Lumbar spinal stenosis, Morbid obesity, Paroxysmal atrial fibrillation, Cerebral Vascular Hemorrhage, PAD (s/p left foot amputation on Plavix), Peripheral neuropathy, Pubic ramus fracture, Tobacco dependence, DM2, and Urinary incontinence, presented to the ED with after presenting to Cardiac clinica and noticed to be volume overloaded on exam and SOBOE. Patient only received 1.5 hours of dialysis this past run. On admit her K was 7.4 and and BNP >4500, therefore, urgent HD performed in ED. In addition her initial troponin was 3.5, with repeat nearly 5. Denies any CP throughout, but diabetic. ACS protocol was initiated. Cardiology was consulted. Of note, she is anuric and very sensitive to missed or shortened dialysis sessions and has had several recent hospitalizations from volume/ electrolytes issues. In regards to her ongoing diarrhea, unclear cause as she denies recent ABx use and recent C. Diff testing negative. Had recent [facility] 2/8/22: V-stenting of the LAD and circumflex ostium  was accomplished. Patient denies any fevers, night sweats, n/v/d/c, abd pain, dysuria, hematuria or hematochezia, cough, wheezing, SOB, CP, leg swelling, HA, dizziness, weakness, hallucinations, SI, HI, or self injury at this time.      Nephrology was consulted for ESRD management       Past Medical History:   Diagnosis Date    Anxiety     Breast cancer     left    Carotid artery disease     Cataract     Choledocholithiasis     s/p ERCP and stent placement    Chronic diastolic CHF (congestive heart failure)     Chronic obstructive pulmonary disease     Chronic venous insufficiency     Depression     End-stage renal disease on hemodialysis     M/W/F    GERD (gastroesophageal reflux disease)     History of breast cancer     History of colon cancer 2001    s/p sigmoid colectomy    History of kidney stones     History of osteomyelitis of left foot     History of pancreatitis     History of stroke     Hyperlipidemia     Hypertension     Intracerebral hemorrhage     Lumbar degenerative disc disease     Lumbar spinal stenosis     Morbid obesity     Paroxysmal atrial fibrillation     Peripheral artery disease     Peripheral neuropathy     Pubic ramus fracture 2/17/2022    Tobacco dependence     Type II diabetes mellitus     Urinary incontinence        Past Surgical History:   Procedure Laterality Date    ANGIOGRAM, CORONARY, WITH LEFT HEART CATHETERIZATION N/A 2/7/2022    Procedure: Angiogram, Coronary, with Left Heart Cath;  Surgeon: Adam Rutherford MD;  Location: [facility] CATH LAB;  Service: Cardiology;  Laterality: N/A;    ANGIOGRAPHY OF LOWER EXTREMITY Right 9/17/2020    Procedure: Angiogram Extremity Unilateral;  Surgeon: RICK Pollard III, MD;  Location: [facility] OR [address] [place] [address];  Service: Peripheral Vascular;  Laterality: Right;  6.2 minutes of fluro  690.88 mGy  112.64 Gycm2  55 ml    ANGIOGRAPHY OF LOWER EXTREMITY Left 5/13/2021    Procedure: Angiogram Extremity Unilateral;  Surgeon: HOMERO Hills  Jackelyn KOVACS MD;  Location: [address];  Service: Vascular;  Laterality: Left;  35.1 min  971.41 mGy  190.27 Gy.cm  101ml Dye    ANGIOGRAPHY OF LOWER EXTREMITY Right 7/19/2021    Procedure: Angiogram Extremity Unilateral;  Surgeon: HOMERO Hayden II, MD;  Location: [address];  Service: Vascular;  Laterality: Right;  3.0 min  121.00 mGy  26.8713 Gy.cm  13ml Dye    AORTOGRAPHY N/A 5/13/2021    Procedure: AORTOGRAM;  Surgeon: HOMERO Hayden II, MD;  Location: [address];  Service: Vascular;  Laterality: N/A;    AV FISTULA PLACEMENT Right 5/28/2018    Procedure: CREATION -FISTULA-AV;  Surgeon: RICK Pollard III, MD;  Location: [address];  Service: Peripheral Vascular;  Laterality: Right;    BREAST BIOPSY  1/2012    left breast invasive mammary carcinoma (lateral bx) and intraductal papilloma (medial bx)    CARDIOVERSION  11/22/2021    CARDIOVERSION  11/22/2021    Procedure: Cardioversion;  Surgeon: Ad Garcia MD;  Location: [facility] CATH LAB;  Service: Cardiology;;    CATARACT EXTRACTION W/  INTRAOCULAR LENS IMPLANT Right 1/24/2019        CATARACT EXTRACTION W/  INTRAOCULAR LENS IMPLANT Left 1/31/2019    Procedure: EXTRACTION, CATARACT, WITH IOL INSERTION;  Surgeon: Immanuel Moncada MD;  Location: [facility];  Service: Ophthalmology;  Laterality: Left;    CHOLECYSTECTOMY  2001    CORONARY ANGIOGRAPHY N/A 2/8/2022    Procedure: ANGIOGRAM, CORONARY ARTERY;  Surgeon: Abelardo Betancur MD;  Location: [facility] CATH LAB;  Service: Cardiology;  Laterality: N/A;    DEBRIDEMENT OF FOOT Right 2/17/2021    Procedure: DEBRIDEMENT, FOOT right plantar ulcer;  Surgeon: Sonja Corral DPM;  Location: [facility];  Service: Podiatry;  Laterality: Right;    ERCP W/ SPHICTEROTOMY      FINE NEEDLE ASPIRATION  1999    Right breast    FOOT AMPUTATION Left 6/1/2021    Procedure: Transmetatarsal amputation;  Surgeon: Billy Robles DPM;  Location: [address];  Service: Podiatry;  Laterality: Left;     FOOT AMPUTATION Left 7/23/2021    Procedure: AMPUTATION, FOOT;  Surgeon: Billy Robles DPM;  [facility];  Service: Podiatry;  Laterality: Left;    FOOT AMPUTATION THROUGH METATARSAL Right 10/15/2020    Procedure: PARTIAL RAY AMPUTATION GREAT TOE;  Surgeon: Billy Robles DPM;  [facility];  Service: Podiatry;  Laterality: Right;  Stretcher OK    HERNIA REPAIR      LAPAROSCOPIC NEPHRECTOMY Left 2011    MASTECTOMY  2013    left    OOPHORECTOMY Left 2001    REVISION OF ARTERIOVENOUS FISTULA Right 8/15/2018    Procedure: REVISION, AV FISTULA;  Surgeon: RICK Pollard III, MD;  Location: [facility] OR [address] [place] [address];  Service: Peripheral Vascular;  Laterality: Right;    SIGMOIDECTOMY  2001    SURGICAL REMOVAL OF METATARSAL HEAD Right 2/17/2021    Procedure: OSTECTOMY, METATARSAL BONE, diatal 1st;  Surgeon: Sonja Corral DPM;  Location: [facility] OR;  Service: Podiatry;  Laterality: Right;    TOE AMPUTATION Left 5/13/2021    Procedure: AMPUTATION, TOE;  Surgeon: HOMERO Hayden II, MD;  [facility];  Service: Vascular;  Laterality: Left;    TOTAL REDUCTION MAMMOPLASTY Right 2013       Review of patient's allergies indicates:   Allergen Reactions    Cyclobenzaprine Hallucinations    Erythromycin      Stomach upset    Keflex [cephalexin]      Yeast infection    Erythromycin base      Other reaction(s): upset stomach     Current Facility-Administered Medications   Medication Frequency    0.9%  NaCl infusion Once    acetaminophen tablet 650 mg Q8H PRN    albuterol inhaler 2 puff Q6H PRN    amiodarone tablet 200 mg Daily    aspirin chewable tablet 81 mg Daily    atorvastatin tablet 80 mg Daily    dextrose 10% bolus 125 mL PRN    dextrose 10% bolus 250 mL PRN    gelatin adsorbable 12-7 mm top sponge sponge 1 applicator PRN    glucagon (human recombinant) injection 1 mg PRN    glucose chewable tablet 16 g PRN    glucose chewable tablet 24 g PRN    heparin 25,000  units in dextrose 5% (100 units/ml) IV bolus from bag - ADDITIONAL PRN BOLUS - 30 units/kg (max bolus 4000 units) PRN    heparin 25,000 units in dextrose 5% (100 units/ml) IV bolus from bag - ADDITIONAL PRN BOLUS - 60 units/kg (max bolus 4000 units) PRN    heparin 25,000 units in dextrose 5% 250 mL (100 units/mL) infusion LOW INTENSITY nomogram - [facility] Continuous    insulin detemir U-100 pen 2 Units BID    lisinopriL tablet 2.5 mg Daily    melatonin tablet 6 mg Nightly    metoprolol tartrate (LOPRESSOR) tablet 25 mg BID    naloxone 0.4 mg/mL injection 0.02 mg PRN    polyethylene glycol packet 17 g BID PRN    sodium chloride 0.9% bolus 250 mL PRN     Family History       Problem Relation (Age of Onset)    Arthritis Mother    Breast cancer Maternal Grandmother    Cancer Maternal Grandmother, Maternal Aunt    Celiac disease Sister    Diabetes Father    Heart disease Mother, Father, Maternal Grandmother          Tobacco Use    Smoking status: Current Some Day Smoker     Packs/day: 0.50     Years: 28.00     Pack years: 14.00     Types: Cigarettes     Start date: 1990     Last attempt to quit: 2018     Years since quittin.2    Smokeless tobacco: Never Used    Tobacco comment: anxious   Substance and Sexual Activity    Alcohol use: No    Drug use: No    Sexual activity: Not Currently     Partners: Male     Review of Systems   Constitutional:  Negative for activity change and fatigue.   Respiratory:  Positive for shortness of breath. Negative for apnea.    Cardiovascular:  Negative for chest pain.   Objective:     Vital Signs (Most Recent):  Temp: 97.8 °F (36.6 °C) (22 1113)  Pulse: (!) 59 (22 1113)  Resp: 20 (22 1113)  BP: (!) 143/73 (22 1113)  SpO2: 97 % (22 1113)  O2 Device (Oxygen Therapy): nasal cannula (22 0824)   Vital Signs (24h Range):  Temp:  [97.7 °F (36.5 °C)-98.4 °F (36.9 °C)] 97.8 °F (36.6 °C)  Pulse:  [] 59  Resp:  [12-30] 20  SpO2:  [94 %-100  %] 97 %  BP: (109-197)/(58-88) 143/73     Weight: 98.4 kg (217 lb) (03/31/22 0826)  Body mass index is 37.25 kg/m².  Body surface area is 2.11 meters squared.    I/O last 3 completed shifts:  In: 500 [Other:500]  Out: 3500 [Other:3500]    Physical Exam  Constitutional:       Appearance: She is obese. She is ill-appearing.   Cardiovascular:      Rate and Rhythm: Normal rate.      Pulses: Normal pulses.   Pulmonary:      Effort: Pulmonary effort is normal.   Abdominal:      General: Abdomen is flat.   Musculoskeletal:         General: Normal range of motion.   Skin:     General: Skin is dry.   Neurological:      Mental Status: She is alert and oriented to person, place, and time.   Psychiatric:         Behavior: Behavior normal.       Significant Labs:  All labs within the past 24 hours have been reviewed.        Assessment/Plan:     ESRD (end stage renal disease)    ESRD-HD  Recent Labs   Lab 03/30/22  1656 03/31/22  0040 03/31/22  0922    136 134*   K 7.2* 4.8 5.7*   CL 92* 96 94*   CO2 25 21* 27   BUN 70* 38* 43*   CREATININE 9.0* 6.0* 6.8*   CALCIUM 9.2 9.2 9.5   PHOS 9.1*  --   --        INPUT/OUTPUT  I/O last 3 completed shifts:  In: 500 [Other:500]  Out: 3500 [Other:3500]  -----------------------------------  No intake/output data recorded.    Dialysis for metabolic clearance and volume management provided today.   Ultrafiltration goal: Liters: 1-2L  Duration: 3 hours  Labs have been reviewed.  Dialysate adjusted to current labs.  -Monitor for complications and adverse events.  -Continue to monitor intake and output.  -Daily weights.  -Pre and Post-HD weights to determine EDW.   -Avoid nephrotoxic medication and renal dose medications to GFR.  -Low NA, K, PO4 diet.  -Will continue to monitoring.     Mineral Bone Disease in CKD   Lab Results   Component Value Date    .0 (H) 07/14/2021    CALCIUM 9.5 03/31/2022    PHOS 9.1 (HH) 03/30/2022       -PO4, Mg and Calcium levels.   -Renal diet with protein  intake goal 1.5 g/kg/d.  -Novasource with meals.  -Daily renal panel to monitor phosphate and albumin.  -Continue sevelamer     Anemia of Chronic Kidney Disease   Recent Labs   Lab 03/30/22  1656 03/31/22  0922   WBC 4.91 3.57*   HGB 12.0 11.5*   HCT 38.4 38.0    137*     Lab Results   Component Value Date    IRON 42 11/25/2021    TIBC 268 11/25/2021    FERRITIN 919 (H) 08/05/2021        --Maintain Hb>7.0 g/dL. -Goal in ESRD is Hgb of 10-11.  -Iron studies in AM. -10  -Maintain Hb>7g/dL  -Goal in ESRD is Hgb of 10-11.     Arterial Hypertension    -Normotensive 140   -No lab stick or BP intake on access site.    Hyperlipidemia    -Continue current meds.                 Thank you for your consult. I will follow-up with patient. Please contact us if you have any additional questions.    Cherry Ferreira MD  Nephrology  [facility] - Cardiology Stepdown

## 2022-03-31 NOTE — PLAN OF CARE
Problem: Hemodynamic Instability (Hemodialysis)  Goal: Effective Tissue Perfusion  Outcome: Ongoing, Progressing     Problem: Adult Inpatient Plan of Care  Goal: Plan of Care Review  Outcome: Ongoing, Progressing  Flowsheets (Taken 3/31/2022 1815)  Plan of Care Reviewed With: patient     Problem: Fall Injury Risk  Goal: Absence of Fall and Fall-Related Injury  Outcome: Ongoing, Progressing         Pt verbalized understanding or safe transferring and ambulation throughout room, she was encouraged to use the call light for assistance. Pt plan of care was reviewed with her for Left Heart Cath procedure soon, she was agreeable for going to get it done. Pt currently in dialysis. Will continue to monitor once pt and her condition once she arrives back to floor.

## 2022-03-31 NOTE — SUBJECTIVE & OBJECTIVE
Past Medical History:   Diagnosis Date    Anxiety     Breast cancer     left    Carotid artery disease     Cataract     Choledocholithiasis     s/p ERCP and stent placement    Chronic diastolic CHF (congestive heart failure)     Chronic obstructive pulmonary disease     Chronic venous insufficiency     Depression     End-stage renal disease on hemodialysis     M/W/F    GERD (gastroesophageal reflux disease)     History of breast cancer     History of colon cancer 2001    s/p sigmoid colectomy    History of kidney stones     History of osteomyelitis of left foot     History of pancreatitis     History of stroke     Hyperlipidemia     Hypertension     Intracerebral hemorrhage     Lumbar degenerative disc disease     Lumbar spinal stenosis     Morbid obesity     Paroxysmal atrial fibrillation     Peripheral artery disease     Peripheral neuropathy     Pubic ramus fracture 2/17/2022    Tobacco dependence     Type II diabetes mellitus     Urinary incontinence        Past Surgical History:   Procedure Laterality Date    ANGIOGRAM, CORONARY, WITH LEFT HEART CATHETERIZATION N/A 2/7/2022    Procedure: Angiogram, Coronary, with Left Heart Cath;  Surgeon: Adam Rutherford MD;  Location: [facility] CATH LAB;  Service: Cardiology;  Laterality: N/A;    ANGIOGRAPHY OF LOWER EXTREMITY Right 9/17/2020    Procedure: Angiogram Extremity Unilateral;  Surgeon: RICK Pollard III, MD;  Location: [address];  Service: Peripheral Vascular;  Laterality: Right;  6.2 minutes of fluro  690.88 mGy  112.64 Gycm2  55 ml    ANGIOGRAPHY OF LOWER EXTREMITY Left 5/13/2021    Procedure: Angiogram Extremity Unilateral;  Surgeon: HOMERO Hayden II, MD;  Location: [address];  Service: Vascular;  Laterality: Left;  35.1 min  971.41 mGy  190.27 Gy.cm  101ml Dye    ANGIOGRAPHY OF LOWER EXTREMITY Right 7/19/2021    Procedure: Angiogram Extremity Unilateral;  Surgeon: HOMERO Hayden II, MD;  Location: [address];  Service: Vascular;   Laterality: Right;  3.0 min  121.00 mGy  26.8713 Gy.cm  13ml Dye    AORTOGRAPHY N/A 5/13/2021    Procedure: AORTOGRAM;  Surgeon: HOMERO Hayden II, MD;  [address];  Service: Vascular;  Laterality: N/A;    AV FISTULA PLACEMENT Right 5/28/2018    Procedure: CREATION -FISTULA-AV;  Surgeon: RICK Pollard III, MD;  [address];  Service: Peripheral Vascular;  Laterality: Right;    BREAST BIOPSY  1/2012    left breast invasive mammary carcinoma (lateral bx) and intraductal papilloma (medial bx)    CARDIOVERSION  11/22/2021    CARDIOVERSION  11/22/2021    Procedure: Cardioversion;  Surgeon: Ad Garcia MD;  Location: [facility] CATH LAB;  Service: Cardiology;;    CATARACT EXTRACTION W/  INTRAOCULAR LENS IMPLANT Right 1/24/2019        CATARACT EXTRACTION W/  INTRAOCULAR LENS IMPLANT Left 1/31/2019    Procedure: EXTRACTION, CATARACT, WITH IOL INSERTION;  Surgeon: Immanuel Moncada MD;  Location: [facility];  Service: Ophthalmology;  Laterality: Left;    CHOLECYSTECTOMY  2001    CORONARY ANGIOGRAPHY N/A 2/8/2022    Procedure: ANGIOGRAM, CORONARY ARTERY;  Surgeon: Abelardo Betancur MD;  Location: [facility] CATH LAB;  Service: Cardiology;  Laterality: N/A;    DEBRIDEMENT OF FOOT Right 2/17/2021    Procedure: DEBRIDEMENT, FOOT right plantar ulcer;  Surgeon: Sonja Corral DPM;  Location: [facility];  Service: Podiatry;  Laterality: Right;    ERCP W/ SPHICTEROTOMY      FINE NEEDLE ASPIRATION  1999    Right breast    FOOT AMPUTATION Left 6/1/2021    Procedure: Transmetatarsal amputation;  Surgeon: Billy Robles DPM;  [address];  Service: Podiatry;  Laterality: Left;    FOOT AMPUTATION Left 7/23/2021    Procedure: AMPUTATION, FOOT;  Surgeon: Billy Robles DPM;  [address];  Service: Podiatry;  Laterality: Left;    FOOT AMPUTATION THROUGH METATARSAL Right 10/15/2020    Procedure: PARTIAL RAY AMPUTATION GREAT TOE;  Surgeon: Billy Robles DPM;  Location: [address];   Service: Podiatry;  Laterality: Right;  Stretcher OK    HERNIA REPAIR      LAPAROSCOPIC NEPHRECTOMY Left 2011    MASTECTOMY  2013    left    OOPHORECTOMY Left 2001    REVISION OF ARTERIOVENOUS FISTULA Right 8/15/2018    Procedure: REVISION, AV FISTULA;  Surgeon: RICK Pollard III, MD;  Location: [facility] OR [address] [place] [address];  Service: Peripheral Vascular;  Laterality: Right;    SIGMOIDECTOMY  2001    SURGICAL REMOVAL OF METATARSAL HEAD Right 2/17/2021    Procedure: OSTECTOMY, METATARSAL BONE, diatal 1st;  Surgeon: Sonja Corral DPM;  Location: [facility] OR;  Service: Podiatry;  Laterality: Right;    TOE AMPUTATION Left 5/13/2021    Procedure: AMPUTATION, TOE;  Surgeon: HOMERO Hayden II, MD;  [facility];  Service: Vascular;  Laterality: Left;    TOTAL REDUCTION MAMMOPLASTY Right 2013       Review of patient's allergies indicates:   Allergen Reactions    Cyclobenzaprine Hallucinations    Erythromycin      Stomach upset    Keflex [cephalexin]      Yeast infection    Erythromycin base      Other reaction(s): upset stomach     Current Facility-Administered Medications   Medication Frequency    0.9%  NaCl infusion Once    acetaminophen tablet 650 mg Q8H PRN    albuterol inhaler 2 puff Q6H PRN    amiodarone tablet 200 mg Daily    aspirin chewable tablet 81 mg Daily    atorvastatin tablet 80 mg Daily    dextrose 10% bolus 125 mL PRN    dextrose 10% bolus 250 mL PRN    gelatin adsorbable 12-7 mm top sponge sponge 1 applicator PRN    glucagon (human recombinant) injection 1 mg PRN    glucose chewable tablet 16 g PRN    glucose chewable tablet 24 g PRN    heparin 25,000 units in dextrose 5% (100 units/ml) IV bolus from bag - ADDITIONAL PRN BOLUS - 30 units/kg (max bolus 4000 units) PRN    heparin 25,000 units in dextrose 5% (100 units/ml) IV bolus from bag - ADDITIONAL PRN BOLUS - 60 units/kg (max bolus 4000 units) PRN    heparin 25,000 units in dextrose 5% 250 mL (100 units/mL) infusion LOW INTENSITY nomogram - [facility]  Continuous    insulin detemir U-100 pen 2 Units BID    lisinopriL tablet 2.5 mg Daily    melatonin tablet 6 mg Nightly    metoprolol tartrate (LOPRESSOR) tablet 25 mg BID    naloxone 0.4 mg/mL injection 0.02 mg PRN    polyethylene glycol packet 17 g BID PRN    sodium chloride 0.9% bolus 250 mL PRN     Family History       Problem Relation (Age of Onset)    Arthritis Mother    Breast cancer Maternal Grandmother    Cancer Maternal Grandmother, Maternal Aunt    Celiac disease Sister    Diabetes Father    Heart disease Mother, Father, Maternal Grandmother          Tobacco Use    Smoking status: Current Some Day Smoker     Packs/day: 0.50     Years: 28.00     Pack years: 14.00     Types: Cigarettes     Start date: 1990     Last attempt to quit: 2018     Years since quittin.2    Smokeless tobacco: Never Used    Tobacco comment: anxious   Substance and Sexual Activity    Alcohol use: No    Drug use: No    Sexual activity: Not Currently     Partners: Male     Review of Systems   Constitutional:  Negative for activity change and fatigue.   Respiratory:  Positive for shortness of breath. Negative for apnea.    Cardiovascular:  Negative for chest pain.   Objective:     Vital Signs (Most Recent):  Temp: 97.8 °F (36.6 °C) (22 1113)  Pulse: (!) 59 (22 1113)  Resp: 20 (22 1113)  BP: (!) 143/73 (22 1113)  SpO2: 97 % (22 1113)  O2 Device (Oxygen Therapy): nasal cannula (22 0824)   Vital Signs (24h Range):  Temp:  [97.7 °F (36.5 °C)-98.4 °F (36.9 °C)] 97.8 °F (36.6 °C)  Pulse:  [] 59  Resp:  [12-30] 20  SpO2:  [94 %-100 %] 97 %  BP: (109-197)/(58-88) 143/73     Weight: 98.4 kg (217 lb) (22 0826)  Body mass index is 37.25 kg/m².  Body surface area is 2.11 meters squared.    I/O last 3 completed shifts:  In: 500 [Other:500]  Out: 3500 [Other:3500]    Physical Exam  Constitutional:       Appearance: She is obese. She is ill-appearing.   Cardiovascular:      Rate and Rhythm:  Normal rate.      Pulses: Normal pulses.   Pulmonary:      Effort: Pulmonary effort is normal.   Abdominal:      General: Abdomen is flat.   Musculoskeletal:         General: Normal range of motion.   Skin:     General: Skin is dry.   Neurological:      Mental Status: She is alert and oriented to person, place, and time.   Psychiatric:         Behavior: Behavior normal.       Significant Labs:  All labs within the past 24 hours have been reviewed.

## 2022-04-01 PROBLEM — N18.6 ESRD (END STAGE RENAL DISEASE): Status: RESOLVED | Noted: 2022-01-01 | Resolved: 2022-01-01

## 2022-04-01 PROBLEM — E87.5 HYPERKALEMIA: Status: RESOLVED | Noted: 2021-02-15 | Resolved: 2022-01-01

## 2022-04-01 PROBLEM — I16.1 HYPERTENSIVE EMERGENCY: Status: ACTIVE | Noted: 2022-01-01

## 2022-04-01 PROBLEM — I25.2 HISTORY OF NON-ST ELEVATION MYOCARDIAL INFARCTION (NSTEMI): Status: RESOLVED | Noted: 2022-01-01 | Resolved: 2022-01-01

## 2022-04-01 NOTE — ASSESSMENT & PLAN NOTE
History of non-ST elevation myocardial infarction (NSTEMI)  Treating. Appreciate Cardiology. Will switch clopidrogel to ticagrelor. C today.

## 2022-04-01 NOTE — PLAN OF CARE
Patient admitted 2 days ago with stent thrombosis.  Coronary angiography today aborted because patient desaturated when placed supine and was transferred to the ICU for intubation and respiratory management.    Will follow with you.

## 2022-04-01 NOTE — SUBJECTIVE & OBJECTIVE
Interval History: patient taken to cath lab for MI and possible stent thrombosis but before cath was even started patient went into resp distress and intubated     Review of patient's allergies indicates:   Allergen Reactions    Cyclobenzaprine Hallucinations    Erythromycin      Stomach upset    Keflex [cephalexin]      Yeast infection    Erythromycin base      Other reaction(s): upset stomach     Current Facility-Administered Medications   Medication Frequency    0.9%  NaCl infusion PRN    0.9%  NaCl infusion Once    [START ON 4/2/2022] 0.9%  NaCl infusion Once    0.9%  NaCl infusion Once    acetaminophen tablet 650 mg Q8H PRN    albuterol inhaler 2 puff Q6H PRN    amiodarone tablet 200 mg Daily    aspirin chewable tablet 81 mg Daily    atorvastatin tablet 80 mg Daily    dextrose 10% bolus 125 mL PRN    dextrose 10% bolus 125 mL PRN    dextrose 10% bolus 250 mL PRN    dextrose 10% bolus 250 mL PRN    fentaNYL 2500 mcg in 0.9% sodium chloride 250 mL infusion premix (titrating) Continuous    fluconazole 40 mg/ml suspension 50 mg Daily    gelatin adsorbable 12-7 mm top sponge sponge 1 applicator PRN    glucagon (human recombinant) injection 1 mg PRN    glucose chewable tablet 16 g PRN    glucose chewable tablet 24 g PRN    heparin (porcine) injection 1,000 Units PRN    heparin 25,000 units in dextrose 5% (100 units/ml) IV bolus from bag - ADDITIONAL PRN BOLUS - 30 units/kg (max bolus 4000 units) PRN    heparin 25,000 units in dextrose 5% (100 units/ml) IV bolus from bag - ADDITIONAL PRN BOLUS - 60 units/kg (max bolus 4000 units) PRN    heparin 25,000 units in dextrose 5% 250 mL (100 units/mL) infusion LOW INTENSITY nomogram - [facility] Continuous    insulin detemir U-100 pen 2 Units BID    insulin regular injection 9.84 Units Once    lisinopriL tablet 2.5 mg Daily    melatonin tablet 6 mg Nightly    metoprolol tartrate (LOPRESSOR) tablet 25 mg BID    miconazole NITRATE 2 % top powder BID    midazolam (PF) injection PRN     mupirocin 2 % ointment BID    naloxone 0.4 mg/mL injection 0.02 mg PRN    polyethylene glycol packet 17 g BID PRN    propofol (DIPRIVAN) 10 mg/mL infusion Continuous    propofoL (DIPRIVAN) 10 mg/mL infusion     sodium chloride 0.9% bolus 250 mL PRN    sodium chloride 0.9% bolus 250 mL PRN    sodium chloride 0.9% bolus 250 mL PRN    sodium chloride 0.9% flush 10 mL PRN    ticagrelor tablet 90 mg BID     Facility-Administered Medications Ordered in Other Encounters   Medication Frequency    EPINEPHrine 0.1 mg/mL injection PRN    etomidate injection PRN    ketamine in 0.9 % sod chloride 50 mg/5 mL (10 mg/mL) injection PRN    rocuronium injection PRN       Objective:     Vital Signs (Most Recent):  Temp: 97.7 °F (36.5 °C) (04/01/22 1118)  Pulse: 80 (04/01/22 1518)  Resp: 18 (04/01/22 1237)  BP: (!) 214/86 (04/01/22 1518)  SpO2: 97 % (04/01/22 1518)  O2 Device (Oxygen Therapy): ventilator (04/01/22 1518)   Vital Signs (24h Range):  Temp:  [97 °F (36.1 °C)-98.5 °F (36.9 °C)] 97.7 °F (36.5 °C)  Pulse:  [62-88] 80  Resp:  [18-20] 18  SpO2:  [91 %-100 %] 97 %  BP: (100-214)/(51-96) 214/86     Weight: 98.4 kg (217 lb) (03/31/22 0826)  Body mass index is 37.25 kg/m².  Body surface area is 2.11 meters squared.    I/O last 3 completed shifts:  In: 1239.8 [P.O.:240; I.V.:199.8; Other:800]  Out: 6106 [Other:6106]    Physical Exam  Vitals reviewed.   Constitutional:       Appearance: She is obese. She is ill-appearing.      Comments: Sedated and intubated    Cardiovascular:      Rate and Rhythm: Normal rate and regular rhythm.      Heart sounds: Normal heart sounds.   Pulmonary:      Effort: Pulmonary effort is normal.      Breath sounds: Normal breath sounds.      Comments: Intubated   B/L air entry   Abdominal:      General: Bowel sounds are normal.      Palpations: Abdomen is soft.   Musculoskeletal:      Right lower leg: No edema.      Left lower leg: No edema.   Skin:     Coloration: Skin is not jaundiced.      Findings: No  rash.   Neurological:      Comments: Sedated        Significant Labs:  CBC:   Recent Labs   Lab 04/01/22  0245   WBC 3.93   RBC 3.82*   HGB 10.4*   HCT 35.8*   *   MCV 94   MCH 27.2   MCHC 29.1*     CMP:   Recent Labs   Lab 03/31/22  0922 03/31/22  1620 04/01/22  0245   *  --  119*   CALCIUM 9.5  --  9.2   ALBUMIN 3.5  --   --    PROT 7.2  --   --    *  --  134*   K 5.7*   < > 4.9   CO2 27  --  20*   CL 94*  --  97   BUN 43*  --  33*   CREATININE 6.8*  --  5.5*   ALKPHOS 83  --   --    ALT 21  --   --    AST 55*  --   --    BILITOT 0.9  --   --     < > = values in this interval not displayed.        Significant Imaging:  Reviewed

## 2022-04-01 NOTE — NURSING
Pt missed her scheduled Plavix due to dialysis, On call team notified, Dr. Reddy give ok to give missing dose.

## 2022-04-01 NOTE — EICU
Rounding (Video Assessment):  No    Intervention Initiated From:  Bedside    Vonnie Communicated with Bedside Nurse regarding:  Time-Out    Nurse Notified:  Yes    Doctor Notified:  Yes    Comments: Calld into patients room remotely for an Art line placement. A L radial Art line was successfully placed by Dr. Barney.

## 2022-04-01 NOTE — RESPIRATORY THERAPY
RAPID RESPONSE RESPIRATORY THERAPY PROACTIVE NOTE           Time of visit: 952     Code Status: Full Code   : 1955  Bed: 317/317 A:   MRN: [id number]  Time spent at the bedside: 45 - 60 min    SITUATION    Evaluated patient for: O2 requirements.    BACKGROUND    Why is the patient in the hospital?: NSTEMI (non-ST elevated myocardial infarction)    Patient has a past medical history of Anxiety, Breast cancer, Carotid artery disease, Cataract, Choledocholithiasis, Chronic combined systolic and diastolic congestive heart failure, Chronic obstructive pulmonary disease, Chronic venous insufficiency, Depression, End-stage renal disease on hemodialysis, GERD (gastroesophageal reflux disease), History of breast cancer, History of colon cancer, History of kidney stones, History of osteomyelitis of left foot, History of pancreatitis, History of stroke, Hyperlipidemia, Hypertension, Intracerebral hemorrhage, Lumbar degenerative disc disease, Lumbar spinal stenosis, Morbid obesity, Paroxysmal atrial fibrillation, Peripheral artery disease, Peripheral neuropathy, Pubic ramus fracture, Tobacco dependence, Type II diabetes mellitus, and Urinary incontinence.    24 Hours Vitals Range:  Temp:  [97 °F (36.1 °C)-98.5 °F (36.9 °C)]   Pulse:  [62-75]   Resp:  [18-20]   BP: (100-191)/(51-96)   SpO2:  [91 %-100 %]     Labs:    Recent Labs     22  1656 22  0040 22  0922 22  1620 22  0245    136 134*  --  134*   K 7.2* 4.8 5.7* 5.0 4.9   CL 92* 96 94*  --  97   CO2 25 21* 27  --  20*   CREATININE 9.0* 6.0* 6.8*  --  5.5*   * 113* 130*  --  119*   PHOS 9.1*  --   --   --   --    MG 2.1  --   --   --   --         No results for input(s): PH, PCO2, PO2, HCO3, POCSATURATED, BE in the last 72 hours.    ASSESSMENT/INTERVENTIONS    Upon arrival in room patient on RA, sat 97%. Pt states she only needs O2 when ambulating. She also requested extension tubing so she could use O2 when going back and  forth to the restroom, which I went to go get for her, although it took some time to find it. Eventaully found out Pt uses 2L at home, so I left the O2 order in.    Last VS   Temp: 97.7 °F (36.5 °C) (04/01 1118)  Pulse: 63 (04/01 1120)  Resp: 18 (04/01 1118)  BP: 148/79 (04/01 1120)  SpO2: 100 % (04/01 1118)    Level of Consciousness: Level of Consciousness (AVPU): alert  Respiratory Effort: Respiratory Effort: Unlabored, Normal Expansion/Accessory Muscle Usage: Expansion/Accessory Muscles/Retractions: no use of accessory muscles, expansion symmetric  All Lung Field Breath Sounds: All Lung Fields Breath Sounds: Anterior:, Posterior:, Lateral:, diminished  O2 Device/Concentration: Flow (L/min): 2,  , O2 Device (Oxygen Therapy): nasal cannula  NIPPV: No Surgical airway: No  ETCO2 monitored:    Ambu at bedside: Ambu bag with the patient?: Yes, Adult Ambu    Active Orders   Respiratory Care    Inhalation Treatment Once     Frequency: Once     Number of Occurrences: 1 Occurrences    Oxygen Continuous     Frequency: Continuous     Number of Occurrences: Until Specified     Order Questions:      Device type: Low flow      Device: Nasal Cannula (1- 5 Liters)      LPM: 2      Titrate O2 per Oxygen Titration Protocol: Yes      To maintain SpO2 goal of: >= 88%      Notify MD of: Inability to achieve desired SpO2; Sudden change in patient status and requires 20% increase in FiO2; Patient requires >60% FiO2       RECOMMENDATIONS    We recommend: RRT Recs: Continue POC per primary team.    ESCALATION        FOLLOW-UP    Please call back the Rapid Response RT, Anurag Meyer RRT at x [phone] for any questions or concerns.

## 2022-04-01 NOTE — PROGRESS NOTES
Pt transported to ICU from Cath Lab for emergent intubation w/Anesthesia @ bedside. Pt intubated with 7.5 ETT, 22 cm @ lip. Pt tolerated intubation well. eICU called and time out performed per Hospital protocol.     Monitoring closely.

## 2022-04-01 NOTE — SUBJECTIVE & OBJECTIVE
Interval History: She went to cath lab 4/1 for MI and stent thrombosis for recent stent that she had 3 weeks ago. Before cath was started patient went into resp distress and intubated. Patient is hypervolemic and hypertensive.     Review of Systems   Constitutional: Negative for chills, decreased appetite and fever.   HENT:  Negative for congestion and sore throat.    Eyes:  Negative for blurred vision and discharge.   Cardiovascular:  Negative for chest pain, claudication, cyanosis, dyspnea on exertion, leg swelling and palpitations.   Respiratory:  Negative for cough, hemoptysis, shortness of breath and wheezing.    Endocrine: Negative for cold intolerance and heat intolerance.   Skin:  Negative for color change and rash.   Musculoskeletal:  Negative for arthritis, muscle weakness and myalgias.   Gastrointestinal:  Negative for bloating, abdominal pain, constipation, diarrhea, nausea and vomiting.   Genitourinary:  Negative for dysuria.   Neurological:  Negative for dizziness, focal weakness, headaches and weakness.   Psychiatric/Behavioral:  Negative for altered mental status and depression.    Objective:     Vital Signs (Most Recent):  Temp: 97.7 °F (36.5 °C) (04/01/22 1118)  Pulse: 80 (04/01/22 1518)  Resp: 18 (04/01/22 1237)  BP: (!) 214/86 (04/01/22 1518)  SpO2: 97 % (04/01/22 1518)   Vital Signs (24h Range):  Temp:  [97 °F (36.1 °C)-98.5 °F (36.9 °C)] 97.7 °F (36.5 °C)  Pulse:  [62-88] 80  Resp:  [18-20] 18  SpO2:  [91 %-100 %] 97 %  BP: (100-214)/(52-96) 214/86     Weight: 98.4 kg (217 lb)  Body mass index is 37.25 kg/m².     SpO2: 97 %  O2 Device (Oxygen Therapy): ventilator      Intake/Output Summary (Last 24 hours) at 4/1/2022 1611  Last data filed at 3/31/2022 1800  Gross per 24 hour   Intake 366.78 ml   Output 2606 ml   Net -2239.22 ml       Lines/Drains/Airways       Drain  Duration                  Hemodialysis AV Fistula 02/17/22 Right forearm 43 days         NG/OG Tube 04/01/22 1451 Center mouth <1  day              Airway  Duration                  Airway - Non-Surgical 04/01/22 1444 Endotracheal Tube <1 day       Airway Anesthesia 04/01/22 <1 day              Arterial Line  Duration             Arterial Line 04/01/22 1555 Left Radial <1 day              Peripheral Intravenous Line  Duration                  Peripheral IV - Single Lumen 03/31/22 2232 20 G Anterior;Left Forearm <1 day         Peripheral IV - Single Lumen 03/31/22 2232 20 G Anterior;Left;Proximal Upper Arm <1 day                    Physical Exam  Vitals and nursing note reviewed.   Constitutional:       Appearance: Normal appearance. She is obese. She is not ill-appearing.   HENT:      Head: Normocephalic and atraumatic.   Cardiovascular:      Rate and Rhythm: Normal rate and regular rhythm.      Pulses: Normal pulses.      Heart sounds: Normal heart sounds. No murmur heard.    No friction rub. No gallop.   Pulmonary:      Breath sounds: No rales.   Abdominal:      General: Abdomen is flat. Bowel sounds are normal. There is no distension.      Palpations: Abdomen is soft.      Tenderness: There is no abdominal tenderness.   Musculoskeletal:      Right lower leg: Edema present.      Left lower leg: Edema present.   Skin:     General: Skin is warm.   Neurological:      Mental Status: She is alert.       Significant Labs: CMP   Recent Labs   Lab 03/30/22  1656 03/31/22  0040 03/31/22  0922 03/31/22  1620 04/01/22  0245    136 134*  --  134*   K 7.2* 4.8 5.7* 5.0 4.9   CL 92* 96 94*  --  97   CO2 25 21* 27  --  20*   * 113* 130*  --  119*   BUN 70* 38* 43*  --  33*   CREATININE 9.0* 6.0* 6.8*  --  5.5*   CALCIUM 9.2 9.2 9.5  --  9.2   PROT 7.5  --  7.2  --   --    ALBUMIN 3.8  --  3.5  --   --    BILITOT 0.9  --  0.9  --   --    ALKPHOS 98  --  83  --   --    AST 36  --  55*  --   --    ALT 20  --  21  --   --    ANIONGAP 19* 19* 13  --  17*   ESTGFRAFRICA 4.7* 7.7* 6.6*  --  8.6*   EGFRNONAA 4.1* 6.7* 5.8*  --  7.4*   , CBC   Recent  Labs   Lab 03/30/22  1656 03/31/22  0922 04/01/22  0245   WBC 4.91 3.57* 3.93   HGB 12.0 11.5* 10.4*   HCT 38.4 38.0 35.8*    137* 114*   , and All pertinent lab results from the last 24 hours have been reviewed.    Significant Imaging:  All imaging reviewed int he last 24 hours

## 2022-04-01 NOTE — PLAN OF CARE
Plan of care discussed with patient patient acknowledges understanding of plan report taken at bedside with patient interaction all safety measures and precautions in place patient on pace with plan of care.

## 2022-04-01 NOTE — PROCEDURES
04/01/2022  4:13 PM    Procedure:  Arterial line  Indication:  Hemodynamic monitoring  Jaime's test on the side of placement was adequate; capillary refill <7 seconds.    Risks, benefits, and indications for the procedure were reviewed with family/ primary decision maker. Consent was signed and placed in chart. Pt prepped and draped in sterile fashion. The following sterile precautions were followed: cap and mask, hand hygiene, sterile gown and sterile gloves, large sterile sheet and sterile field and 2% Chlorhexidine for cutaneous antisepsis. Time out was performed with nursing staff. Lidocaine 1% injected at site. Ultrasound guidance utilized to visualize anatomy. Access obtained without difficulty and blood flow was pulsatile. Wire threaded smoothly and US confirmed appropriate arterial placement of wire. Catheter advanced without resistance. Pt tolerated procedure well. No evidence of hematoma at vascular access site.    Overnight plan:    Plan for HD this evening per nephro, goal for normotensive Bps by tomorrow.    Teresita Barney M.D.  Cardiology Fellow  [facility]

## 2022-04-01 NOTE — CARE UPDATE
Called son, Charan King to update him on the plan of care for his mom. Questions were sought and answered. Son understands the plan.    Son would like mom to remain FULL code at this time but is realistic about her prognosis. Should the patient code in the future he would like a call during the code as he would not like her to be coded for long as per her wishes.    Lily Bourgeois, DO  Internal Medicine, PGY 3  [facility]

## 2022-04-01 NOTE — RESPIRATORY THERAPY
Pt from cath lab in resp distress..  Intubated with a 7.5 22@ lip  See flowsheet for vent settings.  WCTM

## 2022-04-01 NOTE — ASSESSMENT & PLAN NOTE
LDL:   Lab Results   Component Value Date    LDLCALC 33.6 (L) 02/09/2021       PLAN:  -- Continue High intensity statin

## 2022-04-01 NOTE — ASSESSMENT & PLAN NOTE
HbA1c:   Lab Results   Component Value Date    HGBA1C 5.9 (H) 11/03/2021    HGBA1C 7.8 10/09/2018     PLAN:   -- LDSSI + 2 U of detemir BID  -- Goal while inpatient 140-180  -- POC BG Q6H  -- RD for TF recs while intubated

## 2022-04-01 NOTE — PROGRESS NOTES
[facility]  Cardiology  Progress Note    Patient Name: Kylie King  MRN: [id number]  Admission Date: 3/30/2022  Hospital Length of Stay: 2 days  Code Status: Full Code   Attending Physician: Terry Plata MD   Primary Care Physician: Virginia Foote MD  Expected Discharge Date: 4/2/2022  Principal Problem:NSTEMI (non-ST elevated myocardial infarction)    Subjective:     Hospital Course:   She was subsequently admitted to ED for urgent HD however noted to have NSTEMI and admitted to hospital medicine. Trop notable for rise 4--> 25 with EKG showng IVCD/LBBB and TTE with worsening EF 40%--> 28% and RWMA in LAD and Lcx territory. Per my exam, patient asymptomatic however she was also asymptomatic with last NSTEMI presentation. She was loaded with ASA and plavix and on hep gtt however labwork notable for persistent hyperkalemia K 5.7 Hg 12 Plt 168. She underwent HD on 4/1 but still had persistent hypervolemia. Went for an angiogram on 4/1 due to NSTEMI. Troponin increased to 25. Before cath was started patient went into resp distress and intubated.       Interval History: She went to cath lab 4/1 for MI and stent thrombosis for recent stent that she had 3 weeks ago. Before cath was started patient went into resp distress and intubated. Patient is hypervolemic and hypertensive.     Review of Systems   Constitutional: Negative for chills, decreased appetite and fever.   HENT:  Negative for congestion and sore throat.    Eyes:  Negative for blurred vision and discharge.   Cardiovascular:  Negative for chest pain, claudication, cyanosis, dyspnea on exertion, leg swelling and palpitations.   Respiratory:  Negative for cough, hemoptysis, shortness of breath and wheezing.    Endocrine: Negative for cold intolerance and heat intolerance.   Skin:  Negative for color change and rash.   Musculoskeletal:  Negative for arthritis, muscle weakness and myalgias.   Gastrointestinal:  Negative for  bloating, abdominal pain, constipation, diarrhea, nausea and vomiting.   Genitourinary:  Negative for dysuria.   Neurological:  Negative for dizziness, focal weakness, headaches and weakness.   Psychiatric/Behavioral:  Negative for altered mental status and depression.    Objective:     Vital Signs (Most Recent):  Temp: 97.7 °F (36.5 °C) (04/01/22 1118)  Pulse: 80 (04/01/22 1518)  Resp: 18 (04/01/22 1237)  BP: (!) 214/86 (04/01/22 1518)  SpO2: 97 % (04/01/22 1518)   Vital Signs (24h Range):  Temp:  [97 °F (36.1 °C)-98.5 °F (36.9 °C)] 97.7 °F (36.5 °C)  Pulse:  [62-88] 80  Resp:  [18-20] 18  SpO2:  [91 %-100 %] 97 %  BP: (100-214)/(52-96) 214/86     Weight: 98.4 kg (217 lb)  Body mass index is 37.25 kg/m².     SpO2: 97 %  O2 Device (Oxygen Therapy): ventilator      Intake/Output Summary (Last 24 hours) at 4/1/2022 1611  Last data filed at 3/31/2022 1800  Gross per 24 hour   Intake 366.78 ml   Output 2606 ml   Net -2239.22 ml       Lines/Drains/Airways       Drain  Duration                  Hemodialysis AV Fistula 02/17/22 Right forearm 43 days         NG/OG Tube 04/01/22 1451 Center mouth <1 day              Airway  Duration                  Airway - Non-Surgical 04/01/22 1444 Endotracheal Tube <1 day       Airway Anesthesia 04/01/22 <1 day              Arterial Line  Duration             Arterial Line 04/01/22 1555 Left Radial <1 day              Peripheral Intravenous Line  Duration                  Peripheral IV - Single Lumen 03/31/22 2232 20 G Anterior;Left Forearm <1 day         Peripheral IV - Single Lumen 03/31/22 2232 20 G Anterior;Left;Proximal Upper Arm <1 day                    Physical Exam  Vitals and nursing note reviewed.   Constitutional:       Appearance: Normal appearance. She is obese. She is not ill-appearing.   HENT:      Head: Normocephalic and atraumatic.   Cardiovascular:      Rate and Rhythm: Normal rate and regular rhythm.      Pulses: Normal pulses.      Heart sounds: Normal heart sounds.  No murmur heard.    No friction rub. No gallop.   Pulmonary:      Breath sounds: No rales.   Abdominal:      General: Abdomen is flat. Bowel sounds are normal. There is no distension.      Palpations: Abdomen is soft.      Tenderness: There is no abdominal tenderness.   Musculoskeletal:      Right lower leg: Edema present.      Left lower leg: Edema present.   Skin:     General: Skin is warm.   Neurological:      Mental Status: She is alert.       Significant Labs: CMP   Recent Labs   Lab 03/30/22  1656 03/31/22  0040 03/31/22  0922 03/31/22  1620 04/01/22  0245    136 134*  --  134*   K 7.2* 4.8 5.7* 5.0 4.9   CL 92* 96 94*  --  97   CO2 25 21* 27  --  20*   * 113* 130*  --  119*   BUN 70* 38* 43*  --  33*   CREATININE 9.0* 6.0* 6.8*  --  5.5*   CALCIUM 9.2 9.2 9.5  --  9.2   PROT 7.5  --  7.2  --   --    ALBUMIN 3.8  --  3.5  --   --    BILITOT 0.9  --  0.9  --   --    ALKPHOS 98  --  83  --   --    AST 36  --  55*  --   --    ALT 20  --  21  --   --    ANIONGAP 19* 19* 13  --  17*   ESTGFRAFRICA 4.7* 7.7* 6.6*  --  8.6*   EGFRNONAA 4.1* 6.7* 5.8*  --  7.4*   , CBC   Recent Labs   Lab 03/30/22  1656 03/31/22  0922 04/01/22  0245   WBC 4.91 3.57* 3.93   HGB 12.0 11.5* 10.4*   HCT 38.4 38.0 35.8*    137* 114*   , and All pertinent lab results from the last 24 hours have been reviewed.    Significant Imaging:  All imaging reviewed int he last 24 hours    Assessment and Plan:     * NSTEMI (non-ST elevated myocardial infarction)  Tests     LDL:   Lab Results   Component Value Date    LDLCALC 33.6 (L) 02/09/2021        BNP:   Lab Results   Component Value Date    BNP 4,554 (H) 03/30/2022        TSH:   Lab Results   Component Value Date    TSH 2.540 10/30/2021        HbA1c:   Lab Results   Component Value Date    HGBA1C 5.9 (H) 11/03/2021    HGBA1C 7.8 10/09/2018       MELISSA score: 5  Luana score: 117    Previous [facility] 2/7/22:  · The Ost Cx to Prox Cx lesion was 80% stenosed.  · The 1st Mrg lesion was  "60% stenosed.  · The 1st Diag lesion was 60% stenosed.  · The estimated blood loss was <50 mL.   She has LCX and Diag disease but the LCX is the most important lesion.  Because this is an ostial lesion, will do LAD/LCX "V" stenting.          PLAN:  - attempted LHC with IC on 4/1 but batient became acutely hypertenive, tachycardiac and hypoxic requiring the LHC to be aborted  - Maintain on telemetry and repeat EKG in the AM to look for any subtle changes  - Keep NPO for further investigation   - Up to date risk stratification labs to be ordered and addressed appropriately     --> TSH, Lipids, HbA1c    - ACS Core Measures     Heparin / Enoxaparin: heparin gtt     Aspirin + Brillinta     Beta-Blocker: Lopressor     ACE/ARB: On hold      Statin: Atorvastatin         Hypertensive emergency  PLAN:  -- resumed on home meds and started on Nicardipine    COPD without exacerbation  Patient is intubated    Type 2 diabetes mellitus with diabetic peripheral angiopathy and gangrene, with long-term current use of insulin     HbA1c:   Lab Results   Component Value Date    HGBA1C 5.9 (H) 11/03/2021    HGBA1C 7.8 10/09/2018     PLAN:   -- LDSSI + 2 U of detemir BID  -- Goal while inpatient 140-180  -- POC BG Q6H  -- RD for TF recs while intubated      Atrial fibrillation  [id number] Scoring System Interpretation   (CHF, HTN, Age > 75 (2), DM II, Stroke, Vascular Disease Hx, Age > 65 (1), Sex Female)  [id number] Score  Adjusted Stroke Risk   0    0 %   1    1.3 %   2    2.2 %   3    3.2 %    4    4.0 %   5    6.7 %   6    9.8 %   7    9.6 %   8    6.7 %    9    15.2 %    HAS-BLED Scoring System Interpretation   (Hypertension, Abnormal Liver and Kidney Function, Stroke, Bleeding Tendency, Labile INR, Elderly, Drugs (EtOH and ASA/NSAIDs)    HAS-BLED Score  Bleeds per 100 patient years   0           1.13   1           1.02   2           1.88   3           3.74   4           8.70   5-9                 Insufficient Data      PLAN:  - " Maintain K > 4, Mag > 2 and Ca/iCal WNL to decrease arrhythmogenic potential  - Cycle troponin to rule out ischemic etiology  - Rate control with Metoprolol   - Rhythm control with heparin gtt    --> [id number] score 4    - Maintain on telemetry and daily morning    --> Please perform EKG if the patient converts spontaneously     Anemia in ESRD (end-stage renal disease)  Management per nephro    Acute on chronic respiratory failure  Hypervolemia controlled with HD  Patient is currently intubated    End-stage renal disease on hemodialysis  Patient is ESRD on HD TTS has right upper ext forearm fistula. upgraded to ICU today   Last HD was done 3/31/22 with with 2L out   Patient has been neg 4 Liters since admission     She went to cath lab 4/1 for MI and stent thrombosis for recent stent that she had 3 weeks ago. Before cath was started patient went into resp distress and intubated. Patient is hypervolemic and hypertensive.       PLAN:  -- plans for addition HD this evening 4/1     Acute on chronic combined systolic and diastolic heart failure  ICM NYHA Class III Acute on Chronic Systolic and diastolic HF Stage C: Structural heart disease with prior or current symptoms of HF.  - Most recent TTE demonstrates: LVef 28 %, Grade II DD - Mild AS Aortic valve area is 1.62 cm2; peak velocity is 1.46 m/s; mean gradient is 4 mmHg. CVP 15   - EKG : NSR with QTc 510  - Troponin 24.436  BNP 4,554     PLAN:  - Fluid restriction at 1500 cc with strict I/Os and daily STANDING weights  - Maintain on telemetry    - Up to date risk stratification : TSH, Lipids, HbA1c with optimization of risk factors is necessary  - Check Electrolytes, keep Mag >2 & K+ >4  - SCDs, TEDs, Nursing communication to elevated LE   - Ambulate as tolerated    - Optimal therapy at this time to include :    - Anti-platelet agent with ASA + ticagalor    - ACE/ARB hold at this time    - BB with lopressor 25 mg BID and up titrate as hemodynamics allow    - Statin  with liptor 80 mg QD      - Follow up transitional care visit for heart failure at discharge    History of breast cancer  Per oncology - last seen By Dr. Mclain in 2015  carcinoma of the left breast T2N0 Stage IIA Strongly ER and WY positive, HER-2 negative.  Oncotype .Score returned at 6 indicating a low risk and a 5% relapse rate with hormonal therapy alone.She initiated Letrozole hormonal therapy in April 2013.  .    She had developed a bloody nipple discharge in the latter part of 2012 and a mammogram on December 31 showed asymmetric densities in the left breast at the 5:00 position extending posteriorly.  Ultrasound showed several solid masses extending into the lower outer quadrant.  On January 8 ultrasound-guided biopsy showed an infiltrating ductal carcinoma intermediate grade (3+2+1), 100%  ER-positive, 100%  WY positive, HER-2 1+.  On January 25 right and left mastectomy were performed which showed a 4.4 cm intraductal carcinoma grade 2 with positive deep margin involving skeletal muscle the pectoralis.  [place] lymph node showed multifocal clusters of tumor cells all less than 1 her soles, final pathological stage T2 N0 (IHC positive) stage II A. The right breast was unremarkable pathologically.    Hyperlipidemia     LDL:   Lab Results   Component Value Date    LDLCALC 33.6 (L) 02/09/2021       PLAN:  -- Continue High intensity statin       VTE Risk Mitigation (From admission, onward)         Ordered     heparin (porcine) injection 1,000 Units  As needed (PRN)         04/01/22 1523     Reason for No Pharmacological VTE Prophylaxis  Once        Question:  Reasons:  Answer:  Already adequately anticoagulated on oral Anticoagulants    04/01/22 1525     IP VTE HIGH RISK PATIENT  Once         04/01/22 1525     heparin 25,000 units in dextrose 5% (100 units/ml) IV bolus from bag - ADDITIONAL PRN BOLUS - 60 units/kg (max bolus 4000 units)  As needed (PRN)        Question:  Heparin Infusion Adjustment (DO NOT  MODIFY ANSWER)  Answer:  [email or web address]\Images\Pharmacy\HeparinInfusions\heparin LOW INTENSITY nomogram for [facility] [id number]    03/30/22 1925     heparin 25,000 units in dextrose 5% (100 units/ml) IV bolus from bag - ADDITIONAL PRN BOLUS - 30 units/kg (max bolus 4000 units)  As needed (PRN)        Question:  Heparin Infusion Adjustment (DO NOT MODIFY ANSWER)  Answer:  [email or web address]\Images\Pharmacy\HeparinInfusions\heparin LOW INTENSITY nomogram for [facility] [id number].pdf    03/30/22 1925     heparin 25,000 units in dextrose 5% 250 mL (100 units/mL) infusion LOW INTENSITY nomogram - [facility]  Continuous        Question Answer Comment   Heparin Infusion Adjustment (DO NOT MODIFY ANSWER) [email or web address]\epic\Images\Pharmacy\HeparinInfusions\heparin LOW INTENSITY nomogram for [facility] [id number].pdf    Begin at (in units/kg/hr) 12        03/30/22 1925     Place sequential compression device  Until discontinued         03/30/22 1922                Lily Bourgeois,   Cardiology  [facility] - [facility]

## 2022-04-01 NOTE — ASSESSMENT & PLAN NOTE
Tests     LDL:   Lab Results   Component Value Date    LDLCALC 33.6 (L) 02/09/2021        BNP:   Lab Results   Component Value Date    BNP 4,554 (H) 03/30/2022        TSH:   Lab Results   Component Value Date    TSH 2.540 10/30/2021        HbA1c:   Lab Results   Component Value Date    HGBA1C 5.9 (H) 11/03/2021    HGBA1C 7.8 10/09/2018       MELISSA score: 5  Luana score: 117    PLAN:  - attempted LHC with IC on 4/1 but batient became acutely hypertenive, achycardiac and hypoxic requiring the LHC to be aborted  - Maintain on telemetry and repeat EKG in the AM to look for any subtle changes  - Keep NPO for further investigation   - Up to date risk stratification labs to be ordered and addressed appropriately     --> TSH, Lipids, HbA1c    - ACS Core Measures     Heparin / Enoxaparin: heparin gtt     Aspirin + Brillinta     Beta-Blocker: Lopressor     ACE/ARB: On hold      Statin: Atorvastatin

## 2022-04-01 NOTE — ASSESSMENT & PLAN NOTE
"Tests     LDL:   Lab Results   Component Value Date    LDLCALC 33.6 (L) 02/09/2021        BNP:   Lab Results   Component Value Date    BNP 4,554 (H) 03/30/2022        TSH:   Lab Results   Component Value Date    TSH 2.540 10/30/2021        HbA1c:   Lab Results   Component Value Date    HGBA1C 5.9 (H) 11/03/2021    HGBA1C 7.8 10/09/2018       MELISSA score: 5  Luana score: 117    Previous LHC 2/7/22:  · The Ost Cx to Prox Cx lesion was 80% stenosed.  · The 1st Mrg lesion was 60% stenosed.  · The 1st Diag lesion was 60% stenosed.  · The estimated blood loss was <50 mL.   She has LCX and Diag disease but the LCX is the most important lesion.  Because this is an ostial lesion, will do LAD/LCX "V" stenting.          PLAN:  - attempted LHC with IC on 4/1 but batient became acutely hypertenive, tachycardiac and hypoxic requiring the LHC to be aborted  - Maintain on telemetry and repeat EKG in the AM to look for any subtle changes  - Keep NPO for further investigation   - Up to date risk stratification labs to be ordered and addressed appropriately     --> TSH, Lipids, HbA1c    - ACS Core Measures     Heparin / Enoxaparin: heparin gtt     Aspirin + Brillinta     Beta-Blocker: on hold while on pressors     ACE/ARB: On hold      Statin: Atorvastatin       "

## 2022-04-01 NOTE — PROGRESS NOTES
[facility] - [facility]  Nephrology  Progress Note    Patient Name: Kylie King  MRN: [id number]  Admission Date: 3/30/2022  Hospital Length of Stay: 2 days  Attending Provider: Terry Plata MD   Primary Care Physician: Virginia Foote MD  Principal Problem:NSTEMI (non-ST elevated myocardial infarction)    Subjective:     HPI: Kylie King is a 66 y.o. female who  has a past medical history of Breast cancer (s/p rad/chemo), History of Colon cancer, CAD s/p PCI on 02/08/2022 (on DAPT), Cataract, Choledocholithiasis, HFrEF (40%), COPD, Chronic venous insufficiency, Anxiety w Depression, ESRD (TThS), GERD, History of kidney stones, History of osteomyelitis of left foot, History of pancreatitis, History of stroke, Hyperlipidemia, Hypertension, Intracerebral hemorrhage, Lumbar degenerative disc disease, Lumbar spinal stenosis, Morbid obesity, Paroxysmal atrial fibrillation, Cerebral Vascular Hemorrhage, PAD (s/p left foot amputation on Plavix), Peripheral neuropathy, Pubic ramus fracture, Tobacco dependence, DM2, and Urinary incontinence, presented to the ED with after presenting to Cardiac clinica and noticed to be volume overloaded on exam and SOBOE. Patient only received 1.5 hours of dialysis this past run. On admit her K was 7.4 and and BNP >4500, therefore, urgent HD performed in ED. In addition her initial troponin was 3.5, with repeat nearly 5. Denies any CP throughout, but diabetic. ACS protocol was initiated. Cardiology was consulted. Of note, she is anuric and very sensitive to missed or shortened dialysis sessions and has had several recent hospitalizations from volume/ electrolytes issues. In regards to her ongoing diarrhea, unclear cause as she denies recent ABx use and recent C. Diff testing negative. Had recent C 2/8/22: V-stenting of the LAD and circumflex ostium was accomplished. Patient denies any fevers, night sweats, n/v/d/c, abd pain, dysuria, hematuria or hematochezia,  cough, wheezing, SOB, CP, leg swelling, HA, dizziness, weakness, hallucinations, SI, HI, or self injury at this time.      Nephrology was consulted for ESRD management       Interval History: patient taken to cath lab for MI and possible stent thrombosis but before cath was even started patient went into resp distress and intubated     Review of patient's allergies indicates:   Allergen Reactions    Cyclobenzaprine Hallucinations    Erythromycin      Stomach upset    Keflex [cephalexin]      Yeast infection    Erythromycin base      Other reaction(s): upset stomach     Current Facility-Administered Medications   Medication Frequency    0.9%  NaCl infusion PRN    0.9%  NaCl infusion Once    [START ON 4/2/2022] 0.9%  NaCl infusion Once    0.9%  NaCl infusion Once    acetaminophen tablet 650 mg Q8H PRN    albuterol inhaler 2 puff Q6H PRN    amiodarone tablet 200 mg Daily    aspirin chewable tablet 81 mg Daily    atorvastatin tablet 80 mg Daily    dextrose 10% bolus 125 mL PRN    dextrose 10% bolus 125 mL PRN    dextrose 10% bolus 250 mL PRN    dextrose 10% bolus 250 mL PRN    fentaNYL 2500 mcg in 0.9% sodium chloride 250 mL infusion premix (titrating) Continuous    fluconazole 40 mg/ml suspension 50 mg Daily    gelatin adsorbable 12-7 mm top sponge sponge 1 applicator PRN    glucagon (human recombinant) injection 1 mg PRN    glucose chewable tablet 16 g PRN    glucose chewable tablet 24 g PRN    heparin (porcine) injection 1,000 Units PRN    heparin 25,000 units in dextrose 5% (100 units/ml) IV bolus from bag - ADDITIONAL PRN BOLUS - 30 units/kg (max bolus 4000 units) PRN    heparin 25,000 units in dextrose 5% (100 units/ml) IV bolus from bag - ADDITIONAL PRN BOLUS - 60 units/kg (max bolus 4000 units) PRN    heparin 25,000 units in dextrose 5% 250 mL (100 units/mL) infusion LOW INTENSITY nomogram - [facility] Continuous    insulin detemir U-100 pen 2 Units BID    insulin regular injection 9.84  Units Once    lisinopriL tablet 2.5 mg Daily    melatonin tablet 6 mg Nightly    metoprolol tartrate (LOPRESSOR) tablet 25 mg BID    miconazole NITRATE 2 % top powder BID    midazolam (PF) injection PRN    mupirocin 2 % ointment BID    naloxone 0.4 mg/mL injection 0.02 mg PRN    polyethylene glycol packet 17 g BID PRN    propofol (DIPRIVAN) 10 mg/mL infusion Continuous    propofoL (DIPRIVAN) 10 mg/mL infusion     sodium chloride 0.9% bolus 250 mL PRN    sodium chloride 0.9% bolus 250 mL PRN    sodium chloride 0.9% bolus 250 mL PRN    sodium chloride 0.9% flush 10 mL PRN    ticagrelor tablet 90 mg BID     Facility-Administered Medications Ordered in Other Encounters   Medication Frequency    EPINEPHrine 0.1 mg/mL injection PRN    etomidate injection PRN    ketamine in 0.9 % sod chloride 50 mg/5 mL (10 mg/mL) injection PRN    rocuronium injection PRN       Objective:     Vital Signs (Most Recent):  Temp: 97.7 °F (36.5 °C) (04/01/22 1118)  Pulse: 80 (04/01/22 1518)  Resp: 18 (04/01/22 1237)  BP: (!) 214/86 (04/01/22 1518)  SpO2: 97 % (04/01/22 1518)  O2 Device (Oxygen Therapy): ventilator (04/01/22 1518)   Vital Signs (24h Range):  Temp:  [97 °F (36.1 °C)-98.5 °F (36.9 °C)] 97.7 °F (36.5 °C)  Pulse:  [62-88] 80  Resp:  [18-20] 18  SpO2:  [91 %-100 %] 97 %  BP: (100-214)/(51-96) 214/86     Weight: 98.4 kg (217 lb) (03/31/22 0826)  Body mass index is 37.25 kg/m².  Body surface area is 2.11 meters squared.    I/O last 3 completed shifts:  In: 1239.8 [P.O.:240; I.V.:199.8; Other:800]  Out: 6106 [Other:6106]    Physical Exam  Vitals reviewed.   Constitutional:       Appearance: She is obese. She is ill-appearing.      Comments: Sedated and intubated    Cardiovascular:      Rate and Rhythm: Normal rate and regular rhythm.      Heart sounds: Normal heart sounds.   Pulmonary:      Effort: Pulmonary effort is normal.      Breath sounds: Normal breath sounds.      Comments: Intubated   B/L air entry    Abdominal:      General: Bowel sounds are normal.      Palpations: Abdomen is soft.   Musculoskeletal:      Right lower leg: No edema.      Left lower leg: No edema.   Skin:     Coloration: Skin is not jaundiced.      Findings: No rash.   Neurological:      Comments: Sedated        Significant Labs:  CBC:   Recent Labs   Lab 04/01/22  0245   WBC 3.93   RBC 3.82*   HGB 10.4*   HCT 35.8*   *   MCV 94   MCH 27.2   MCHC 29.1*     CMP:   Recent Labs   Lab 03/31/22  0922 03/31/22  1620 04/01/22  0245   *  --  119*   CALCIUM 9.5  --  9.2   ALBUMIN 3.5  --   --    PROT 7.2  --   --    *  --  134*   K 5.7*   < > 4.9   CO2 27  --  20*   CL 94*  --  97   BUN 43*  --  33*   CREATININE 6.8*  --  5.5*   ALKPHOS 83  --   --    ALT 21  --   --    AST 55*  --   --    BILITOT 0.9  --   --     < > = values in this interval not displayed.        Significant Imaging:  Reviewed     Assessment/Plan:     * NSTEMI (non-ST elevated myocardial infarction)  Per primary     Acute on chronic respiratory failure  Per primary     End-stage renal disease on hemodialysis  Patient is ESRD on HD TTS has right upper ext forearm fistula. upgraded to ICU today   Last HD was done 3/31/22 with with 2L out   Patient has been neg 4 Liters since admission   She went to cath lab today for MI and stent thrombosis for recent stent that she had 3 weeks ago. Before cath was started patient went into resp distress and intubated. Patient is hypervolemic and hypertensive.   Ordered iHD for 3 hours to remove 3L of volume today. Dialysis nurse aware   Labs this morning did now show any significant electrolyte abnormalities or acid base disorders                   Ken Bond MD  Nephrology  [facility] - [facility]

## 2022-04-01 NOTE — ASSESSMENT & PLAN NOTE
[id number] Scoring System Interpretation   (CHF, HTN, Age > 75 (2), DM II, Stroke, Vascular Disease Hx, Age > 65 (1), Sex Female)  [id number] Score  Adjusted Stroke Risk   0    0 %   1    1.3 %   2    2.2 %   3    3.2 %    4    4.0 %   5    6.7 %   6    9.8 %   7    9.6 %   8    6.7 %    9    15.2 %    HAS-BLED Scoring System Interpretation   (Hypertension, Abnormal Liver and Kidney Function, Stroke, Bleeding Tendency, Labile INR, Elderly, Drugs (EtOH and ASA/NSAIDs)    HAS-BLED Score  Bleeds per 100 patient years   0           1.13   1           1.02   2           1.88   3           3.74   4           8.70   5-9                 Insufficient Data      PLAN:  - Maintain K > 4, Mag > 2 and Ca/iCal WNL to decrease arrhythmogenic potential  - Cycle troponin to rule out ischemic etiology  - Rate control with Metoprolol   - Rhythm control with heparin gtt    --> [id number] score 4    - Maintain on telemetry and daily morning    --> Please perform EKG if the patient converts spontaneously

## 2022-04-01 NOTE — PROGRESS NOTES
[facility] - Cardiac Intensive Care  Hospital Medicine  Progress Note    Patient Name: Kylie King  MRN: [id number]  Patient Class: IP- Inpatient   Admission Date: 3/30/2022  Length of Stay: 2 days  Attending Physician: Terry Plata MD  Primary Care Provider: Virginia Foote MD        Subjective:     Principal Problem:NSTEMI (non-ST elevated myocardial infarction)        HPI:  Kylie King is a 66 year old white woman with obesity, chronic obstructive pulmonary disease, coronary artery disease status post V stenting of left anterior descending artery and circumflex ostium on 2/8/2022, chronic systolic and diastolic heart failure, chronic hypoxic respiratory failure (on supplemental oxygen at 2 liters/minute), atrial fibrillation (anticoagulated on apixaban), diabetes mellitus type 2 (treated with insulin), peripheral artery disease, carotid artery stenosis, chronic venous insufficiency, hyperlipidemia, end stage renal disease on hemodialysis (Tuesday Thursday Saturday), secondary hyperparathyroidism, history of breast cancer status post left mastectomy in 2013, radiation, and chemotherapy, history of colon cancer status post sigmoid colectomy in 2001, osteoarthritis, lumbar degenerative disc disease, anxiety, depression, history of intracranial hemorrhage, history of choledocholithiasis status post endoscopic retrograde cholangiopancreatography and biliary stent placement on 2/27/2009, history of cholecystectomy in 2001, history of left nephrectomy on 8/16/2011, history of right partial ray amputation on 10/15/2020, history of left toe amputation on 5/13/2021, left transmetatarsal foot amputation on 6/1/2021, and left foot amputation on 7/23/2021. She is anuric. She lives in the [facility]. Her primary care physician is Dr. Virginia Foote.    She was hospitalized at [facility] from 2/4/2022 to 2/8/2022 for heart failure exacerbation and non-ST elevation  myocardial infarction. She had stent placement.   She was hospitalized again at [facility] from 2/17/2022 to 2/20/2022 for hyperkalemia and encephalopathy.   She was hospitalized again at [facility] from 3/3/2022 to 3/7/2022 for hyperkalemia and hypervolemia.   She presented to [facility] on 3/30/2022 after she was seen in Cardiology clinic and noticed to be volume overloaded on physical exam with shortness of breath on exertion. She had only received 1.5 hours of dialysis during her last session. Her potassium was found to be 7.4 mmol/L and BNP was greater than 4500 pg/mL. She underwent urgent hemodialysis in the emergency department. Troponin was 3.472 ng/mL and repeat troponin was 4.811 so acute coronary syndrome protocol was initiated and Cardiology was consulted. She was admitted to Hospital Medicine Team A.      Overview/Hospital Course:  She was put on heparin infusion in place of her home apixaban. Troponin increased to 24.436 the next morning. Interventional Cardiology was consulted. She was found to be a poor clopidrogel responder.       Interval History: Hyperkalemia resolved. Still short of breath. Getting LHC today.     Review of Systems   Constitutional:  Negative for chills and fever.   Respiratory:  Positive for shortness of breath. Negative for cough.    Neurological:  Negative for seizures and syncope.   Objective:     Vital Signs (Most Recent):  Temp: 97.7 °F (36.5 °C) (04/01/22 1118)  Pulse: 88 (04/01/22 1451)  Resp: 18 (04/01/22 1237)  BP: (!) 177/82 (04/01/22 1451)  SpO2: (!) 93 % (04/01/22 1451)   Vital Signs (24h Range):  Temp:  [97 °F (36.1 °C)-98.5 °F (36.9 °C)] 97.7 °F (36.5 °C)  Pulse:  [62-88] 88  Resp:  [18-20] 18  SpO2:  [91 %-100 %] 93 %  BP: (100-191)/(51-96) 177/82     Weight: 98.4 kg (217 lb)  Body mass index is 37.25 kg/m².    Intake/Output Summary (Last 24 hours) at 4/1/2022 1503  Last data filed at 3/31/2022  1800  Gross per 24 hour   Intake 366.78 ml   Output 2606 ml   Net -2239.22 ml        Physical Exam  Vitals and nursing note reviewed.   Constitutional:       General: She is not in acute distress.     Appearance: She is obese. She is not toxic-appearing or diaphoretic.   Pulmonary:      Effort: Pulmonary effort is normal. No respiratory distress.      Breath sounds: No stridor. No wheezing.   Neurological:      Mental Status: She is alert. Mental status is at baseline.      Motor: No tremor or seizure activity.   Psychiatric:         Attention and Perception: Attention normal.         Mood and Affect: Mood and affect normal.         Cognition and Memory: Cognition normal.           Significant Labs:  CBC:  Recent Labs   Lab 03/30/22  1656 03/31/22  0922 04/01/22  0245   WBC 4.91 3.57* 3.93   HGB 12.0 11.5* 10.4*   HCT 38.4 38.0 35.8*    137* 114*       CMP:  Recent Labs   Lab 03/30/22  1656 03/31/22  0040 03/31/22  0922 03/31/22  1620 04/01/22  0245    136 134*  --  134*   K 7.2* 4.8 5.7* 5.0 4.9   CL 92* 96 94*  --  97   CO2 25 21* 27  --  20*   * 113* 130*  --  119*   BUN 70* 38* 43*  --  33*   CREATININE 9.0* 6.0* 6.8*  --  5.5*   CALCIUM 9.2 9.2 9.5  --  9.2   PROT 7.5  --  7.2  --   --    ALBUMIN 3.8  --  3.5  --   --    BILITOT 0.9  --  0.9  --   --    ALKPHOS 98  --  83  --   --    AST 36  --  55*  --   --    ALT 20  --  21  --   --    ANIONGAP 19* 19* 13  --  17*   EGFRNONAA 4.1* 6.7* 5.8*  --  7.4*           Assessment/Plan:      * NSTEMI (non-ST elevated myocardial infarction)  History of non-ST elevation myocardial infarction (NSTEMI)  Treating. Appreciate Cardiology. Will switch clopidrogel to ticagrelor. [facility] today.    COPD without exacerbation  Titrate SpO2 to maintain O2 88% given COPD. Albuterol-ipratropium prn.    Prolonged Q-T interval on ECG  Qtc 519. Likely associated with long-term amiodarone use.    Type 2 diabetes mellitus with diabetic peripheral angiopathy and gangrene,  with long-term current use of insulin  Takes detemir 4 units BID and aspart 3 units TID with meals at home. Giving detemir 2 units BID and sliding scale aspart.     Atrial fibrillation  Continue home amiodarone, metoprolol. Giving heparin in place of home apixaban.    Anemia in ESRD (end-stage renal disease)  Stable.     History of colon cancer  Status post partial colectomy.    End-stage renal disease on hemodialysis  Nephrology following for dialysis.    Acute on chronic combined systolic and diastolic heart failure  Chronic respiratory failure with hypoxia, on home oxygen therapy  Dialysis to remove fluid.    History of breast cancer  Status post mastectomy, radiation, chemotherapy.    Hyperlipidemia  Continue home atorvastatin.      VTE Risk Mitigation (From admission, onward)         Ordered     heparin 25,000 units in dextrose 5% (100 units/ml) IV bolus from bag - ADDITIONAL PRN BOLUS - 60 units/kg (max bolus 4000 units)  As needed (PRN)        Question:  Heparin Infusion Adjustment (DO NOT MODIFY ANSWER)  Answer:  \[email or web address]\epic\Images\Pharmacy\HeparinInfusions\heparin LOW INTENSITY nomogram for [facility] [id number].pdf    03/30/22 1925     heparin 25,000 units in dextrose 5% (100 units/ml) IV bolus from bag - ADDITIONAL PRN BOLUS - 30 units/kg (max bolus 4000 units)  As needed (PRN)        Question:  Heparin Infusion Adjustment (DO NOT MODIFY ANSWER)  Answer:  \[email or web address]\[email or web address]\Images\Pharmacy\HeparinInfusions\heparin LOW INTENSITY nomogram for [facility] [id number].pdf    03/30/22 1925     heparin 25,000 units in dextrose 5% 250 mL (100 units/mL) infusion LOW INTENSITY nomogram - [facility]  Continuous        Question Answer Comment   Heparin Infusion Adjustment (DO NOT MODIFY ANSWER) \\[email or web address]\[email or web address]\Images\Pharmacy\HeparinInfusions\heparin LOW INTENSITY nomogram for [facility] [id number].pdf    Begin at (in units/kg/hr) 12        03/30/22 1925     IP VTE HIGH RISK PATIENT  Once         03/30/22 1922     Place sequential compression  device  Until discontinued         03/30/22 1922                Discharge Planning   UMAIR: 4/2/2022     Code Status: Full Code   Is the patient medically ready for discharge?: No    Reason for patient still in hospital (select all that apply): Patient unstable and Treatment  Discharge Plan A: Home, Home Health                  Enoc Greco MD  Department of Hospital Medicine   [facility] - Cardiac Intensive Care

## 2022-04-01 NOTE — INTERVAL H&P NOTE
The patient has been examined and the H&P has been reviewed:    I concur with the findings and changes have been noted since the H&P was written: Plavix non responder. Given history of remote ICH/CVA will switch ticagrellor 90mg BID with loading 180 mg X1. Per pharmacy/patient medication is affordable.     Procedure risks, benefits and alternative options discussed and understood by patient/family.          Active Hospital Problems    Diagnosis  POA    *NSTEMI (non-ST elevated myocardial infarction) [I21.4]  Yes    ESRD (end stage renal disease) [N18.6]  Unknown    Prolonged Q-T interval on ECG [R94.31]  Yes    COPD without exacerbation [J44.9]  Yes     Chronic    Type 2 diabetes mellitus with diabetic peripheral angiopathy and gangrene, with long-term current use of insulin [E11.52, Z79.4]  Not Applicable     Chronic    Atrial fibrillation [I48.91]  Yes     Chronic    Anemia in ESRD (end-stage renal disease) [N18.6, D63.1]  Yes     Chronic    Chronic respiratory failure with hypoxia, on home oxygen therapy [J96.11, Z99.81]  Not Applicable     Chronic    Hyperkalemia [E87.5]  Yes    History of colon cancer [Z85.038]  Yes     Chronic    End-stage renal disease on hemodialysis [N18.6, Z99.2]  Not Applicable     Chronic    Acute on chronic combined systolic and diastolic heart failure [I50.43]  Yes    History of breast cancer [Z85.3]  Not Applicable     Chronic    Hyperlipidemia [E78.5]  Yes     Chronic      Resolved Hospital Problems   No resolved problems to display.

## 2022-04-01 NOTE — ANESTHESIA PROCEDURE NOTES
Ad Hoc Intubation    Date/Time: 4/1/2022 2:40 PM  Performed by: Omid Rebolledo MD  Authorized by: Fred Wick Jr., MD     Indications:  Respiratory failure  Diagnosis:  Respiratory failure  Patient Location:  ICU  Timeout:  4/1/2022 2:38 PM  Procedure Start Time:  4/1/2022 2:39 PM  Procedure End Time:  4/1/2022 2:40 PM  Staff:     Anesthesiologist Present: Yes    Intubation:     Induction:  Intravenous    Intubated:  Postinduction    Mask Ventilation:  N/a    Attempts:  1    Attempted By:  Resident anesthesiologist    Method of Intubation:  Video laryngoscopy    Blade:  Glidescope 3    Laryngeal View Grade: Grade I - full view of chords      Difficult Airway Encountered?: No      Complications:  None    Airway Device:  Oral endotracheal tube    Airway Device Size:  7.5    Style/Cuff Inflation:  Cuffed (inflated to minimal occlusive pressure)    Secured at:  The lips    Placement Verified By:  Capnometry and Revisualization with laryngoscopy    Complicating Factors:  None    Findings Post-Intubation:  BS equal bilateral

## 2022-04-01 NOTE — ADDENDUM NOTE
Addendum  created 04/01/22 1639 by Terry Abdullahi MD    Intraprocedure Event edited, Intraprocedure Meds edited

## 2022-04-01 NOTE — PLAN OF CARE
Problem: Device-Related Complication Risk (Hemodialysis)  Goal: Safe, Effective Therapy Delivery  Outcome: Ongoing, Progressing     Problem: Hemodynamic Instability (Hemodialysis)  Goal: Effective Tissue Perfusion  Outcome: Ongoing, Progressing     Problem: Infection (Hemodialysis)  Goal: Absence of Infection Signs and Symptoms  Outcome: Ongoing, Progressing     Problem: Adult Inpatient Plan of Care  Goal: Plan of Care Review  Outcome: Ongoing, Progressing  Goal: Patient-Specific Goal (Individualized)  Outcome: Ongoing, Progressing  Goal: Absence of Hospital-Acquired Illness or Injury  Outcome: Ongoing, Progressing     Problem: Fall Injury Risk  Goal: Absence of Fall and Fall-Related Injury  Outcome: Ongoing, Progressing     Problem: Skin Injury Risk Increased  Goal: Skin Health and Integrity  Outcome: Ongoing, Progressing

## 2022-04-01 NOTE — SUBJECTIVE & OBJECTIVE
Interval History: Hyperkalemia resolved. Still short of breath. Getting [facility] today.     Review of Systems   Constitutional:  Negative for chills and fever.   Respiratory:  Positive for shortness of breath. Negative for cough.    Neurological:  Negative for seizures and syncope.   Objective:     Vital Signs (Most Recent):  Temp: 97.7 °F (36.5 °C) (04/01/22 1118)  Pulse: 88 (04/01/22 1451)  Resp: 18 (04/01/22 1237)  BP: (!) 177/82 (04/01/22 1451)  SpO2: (!) 93 % (04/01/22 1451)   Vital Signs (24h Range):  Temp:  [97 °F (36.1 °C)-98.5 °F (36.9 °C)] 97.7 °F (36.5 °C)  Pulse:  [62-88] 88  Resp:  [18-20] 18  SpO2:  [91 %-100 %] 93 %  BP: (100-191)/(51-96) 177/82     Weight: 98.4 kg (217 lb)  Body mass index is 37.25 kg/m².    Intake/Output Summary (Last 24 hours) at 4/1/2022 1503  Last data filed at 3/31/2022 1800  Gross per 24 hour   Intake 366.78 ml   Output 2606 ml   Net -2239.22 ml        Physical Exam  Vitals and nursing note reviewed.   Constitutional:       General: She is not in acute distress.     Appearance: She is obese. She is not toxic-appearing or diaphoretic.   Pulmonary:      Effort: Pulmonary effort is normal. No respiratory distress.      Breath sounds: No stridor. No wheezing.   Neurological:      Mental Status: She is alert. Mental status is at baseline.      Motor: No tremor or seizure activity.   Psychiatric:         Attention and Perception: Attention normal.         Mood and Affect: Mood and affect normal.         Cognition and Memory: Cognition normal.           Significant Labs:  CBC:  Recent Labs   Lab 03/30/22  1656 03/31/22  0922 04/01/22  0245   WBC 4.91 3.57* 3.93   HGB 12.0 11.5* 10.4*   HCT 38.4 38.0 35.8*    137* 114*       CMP:  Recent Labs   Lab 03/30/22  1656 03/31/22  0040 03/31/22  0922 03/31/22  1620 04/01/22  0245    136 134*  --  134*   K 7.2* 4.8 5.7* 5.0 4.9   CL 92* 96 94*  --  97   CO2 25 21* 27  --  20*   * 113* 130*  --  119*   BUN 70* 38* 43*  --  33*    CREATININE 9.0* 6.0* 6.8*  --  5.5*   CALCIUM 9.2 9.2 9.5  --  9.2   PROT 7.5  --  7.2  --   --    ALBUMIN 3.8  --  3.5  --   --    BILITOT 0.9  --  0.9  --   --    ALKPHOS 98  --  83  --   --    AST 36  --  55*  --   --    ALT 20  --  21  --   --    ANIONGAP 19* 19* 13  --  17*   EGFRNONAA 4.1* 6.7* 5.8*  --  7.4*

## 2022-04-01 NOTE — ASSESSMENT & PLAN NOTE
ICM NYHA Class III Acute on Chronic Systolic and diastolic HF Stage C: Structural heart disease with prior or current symptoms of HF.  - Most recent TTE demonstrates: LVef 28 %, Grade II DD - Mild AS Aortic valve area is 1.62 cm2; peak velocity is 1.46 m/s; mean gradient is 4 mmHg. CVP 15   - EKG : NSR with QTc 510  - Troponin 24.436  BNP 4,554     PLAN:  - Fluid restriction at 1500 cc with strict I/Os and daily STANDING weights  - Maintain on telemetry    - Up to date risk stratification : TSH, Lipids, HbA1c with optimization of risk factors is necessary  - Check Electrolytes, keep Mag >2 & K+ >4  - SCDs, TEDs, Nursing communication to elevated LE   - Ambulate as tolerated    - Optimal therapy at this time to include :    - Anti-platelet agent with ASA + ticagalor    - ACE/ARB hold at this time    - BB with lopressor 25 mg BID and up titrate as hemodynamics allow    - Statin with liptor 80 mg QD      - Follow up transitional care visit for heart failure at discharge

## 2022-04-01 NOTE — ASSESSMENT & PLAN NOTE
Patient is ESRD on HD TTS has right upper ext forearm fistula. upgraded to ICU today   Last HD was done 3/31/22 with with 2L out   Patient has been neg 4 Liters since admission   She went to cath lab today for MI and stent thrombosis for recent stent that she had 3 weeks ago. Before cath was started patient went into resp distress and intubated. Patient is hypervolemic and hypertensive.   Ordered iHD for 3 hours to remove 3L of volume today. Dialysis nurse aware   Labs this morning did now show any significant electrolyte abnormalities or acid base disorders

## 2022-04-01 NOTE — HOSPITAL COURSE
Kylie King is a 66 y.o. female who  has a past medical history of Breast cancer (s/p rad/chemo), History of Colon cancer, CAD s/p PCI on 02/08/2022 (on DAPT), Cataract, Choledocholithiasis, HFrEF (40%), COPD, Chronic venous insufficiency, Anxiety w Depression, ESRD (TThS), GERD, History of kidney stones, History of osteomyelitis of left foot, History of pancreatitis, History of stroke, Hyperlipidemia, Hypertension, Intracerebral hemorrhage, Lumbar degenerative disc disease, Lumbar spinal stenosis, Morbid obesity, Paroxysmal atrial fibrillation, Cerebral Vascular Hemorrhage, PAD (s/p left foot amputation on Plavix), Peripheral neuropathy, Pubic ramus fracture, Tobacco dependence, DM2, and Urinary incontinence, presented to the ED with after presenting to Cardiac clinic and noticed to be volume overloaded on exam .     She was subsequently admitted to ED for urgent HD however noted to have NSTEMI and admitted to hospital medicine. Trop notable for rise 4--> 25 with EKG showng IVCD/LBBB and TTE with worsening EF 40%--> 28% and RWMA in LAD and Lcx territory. She was loaded with ASA and plavix and on hep gtt however labwork notable for persistent hyperkalemia K 5.7 Hg 12 Plt 168. She underwent HD on 4/1 but still had persistent hypervolemia. Went for an angiogram on 4/1 due to NSTEMI. Troponin increased to 25. Before cath patient went into resp distress and intubated. Placed on CRRT 4/1. Hemodynamics were not consistent with cardiogenic shock. Patient went into AF with RVR the night of 4/4. Pressors were changed to neosynephrine and vaso and she was bolused with amiodarone to which she converted back into NSR.     The night of 4/5 patient had an episode of PMVT and was coded - ROSC obtained. Family was called and patient was made DNR by her son. On 4/6 around 7PM pt noted to go into VT/VF. Shortly afterwards pt was in PEA.     Exam:  Patient is unresponsive to verbal and tactile stimuli  No breath sounds are  heart  No heart sounds are heard  Patient is absent pulses in the carotid and femoral areas  Pupils fixed without response to bright light  Absent corneal reflex  Absent gag reflex     I pronounced the patient  at 7:04 PM. Cause of death was VT/VF, shock, CAD.

## 2022-04-01 NOTE — ASSESSMENT & PLAN NOTE
Patient is ESRD on HD TTS has right upper ext forearm fistula. upgraded to ICU today   Last HD was done 3/31/22 with with 2L out   Patient has been neg 4 Liters since admission     She went to cath lab 4/1 for MI and stent thrombosis for recent stent that she had 3 weeks ago. Before cath was started patient went into resp distress and intubated. Patient is hypervolemic and hypertensive.       PLAN:  -- plans for addition HD this evening 4/1

## 2022-04-01 NOTE — PLAN OF CARE
IC plan of care   4/1/22, 2:40 pm     Patient presented to Cath lab this afternoon for LHC +/- PCI due to NSTEMI. Upon arrival she became hypertensive 220s/110s which was treated with IV hydralazine x1. Shortly after patient went into acute respiratory distress requiring escalating amounts of supplemental O2. Anesthesia was called to Cath lab for possible intubation. On their assessment patient was stable from respiratory standpoint, therefore intubation not required.     Patient's procedure was canceled and she is subsequently being transferred to Dr Plata's CCU service for close monitoring.    IC will follow along to determine timing / safety of performing LHC.      Of separate note patient has bilateral groin rash which we recommend starting treatment for with Fluconazole (ordered renal dosing, discussed w/ pharmacy) and antifungal topical powder (ordered).    Edelmira Madera MD   Interventional Cardiology Fellow, PGY-7

## 2022-04-01 NOTE — ASSESSMENT & PLAN NOTE
Per oncology - last seen By Dr. Mclain in 2015  carcinoma of the left breast T2N0 Stage IIA Strongly ER and MO positive, HER-2 negative.  Oncotype .Score returned at 6 indicating a low risk and a 5% relapse rate with hormonal therapy alone.She initiated Letrozole hormonal therapy in April 2013.  .    She had developed a bloody nipple discharge in the latter part of 2012 and a mammogram on December 31 showed asymmetric densities in the left breast at the 5:00 position extending posteriorly.  Ultrasound showed several solid masses extending into the lower outer quadrant.  On January 8 ultrasound-guided biopsy showed an infiltrating ductal carcinoma intermediate grade (3+2+1), 100%  ER-positive, 100%  MO positive, HER-2 1+.  On January 25 right and left mastectomy were performed which showed a 4.4 cm intraductal carcinoma grade 2 with positive deep margin involving skeletal muscle the pectoralis.  [place] lymph node showed multifocal clusters of tumor cells all less than 1 her soles, final pathological stage T2 N0 (IHC positive) stage II A. The right breast was unremarkable pathologically.

## 2022-04-01 NOTE — PROGRESS NOTES
03/31/22 1800   Post-Hemodialysis Assessment   Rinseback Volume (mL) 300 mL   Blood Volume Processed (Liters) 48.1 L   Dialyzer Clearance Lightly streaked   Duration of Treatment (minutes) 180 minutes   Hemodialysis Intake (mL) 300 mL   Total UF (mL) 2606 mL   Net Fluid Removal 2006   Patient Response to Treatment tolerated well   Arterial bleeding stop time (min) 15 min   Venous bleeding stop time (min) 15 min   Post-Hemodialysis Comments see note     HD complete. Net removal 2006 ml. Pt awake, alert oriented. Report given at bedside to oncoming shift nurse Rachelle padron to access site clean dry and intact. Pt VSS, NAD.

## 2022-04-02 NOTE — PROGRESS NOTES
Pharmacokinetic Initial Assessment: IV Vancomycin    Assessment/Plan:    Initiate intravenous vancomycin with loading dose of 2000 mg once with subsequent doses when random concentrations are less than 20 mcg/mL  Desired empiric serum trough concentration is 10 to 20 mcg/mL  Draw vancomycin random level on 4/2 with AM labs  Pharmacy will continue to follow and monitor vancomycin.      Please contact pharmacy at extension [phone] with any questions regarding this assessment.     Thank you for the consult,   Vickie Sorto       Patient brief summary:  Kylie King is a 66 y.o. female initiated on antimicrobial therapy with IV Vancomycin for treatment of suspected lower respiratory infection    Drug Allergies:   Review of patient's allergies indicates:   Allergen Reactions    Cyclobenzaprine Hallucinations    Erythromycin      Stomach upset    Keflex [cephalexin]      Yeast infection    Erythromycin base      Other reaction(s): upset stomach       Actual Body Weight:   98.4 kg    Renal Function:   Estimated Creatinine Clearance: 11.3 mL/min (A) (based on SCr of 5.5 mg/dL (H)).     Dialysis Method (if applicable):  intermittent HD    CBC (last 72 hours):  Recent Labs   Lab Result Units 03/30/22  1656 03/31/22  0922 04/01/22  0245   WBC K/uL 4.91 3.57* 3.93   Hemoglobin g/dL 12.0 11.5* 10.4*   Hematocrit % 38.4 38.0 35.8*   Platelets K/uL 168 137* 114*   Gran % % 78.4* 66.6 68.9   Lymph % % 14.7* 24.1 22.9   Mono % % 5.9 7.6 6.4   Eosinophil % % 0.2 0.8 1.0   Basophil % % 0.6 0.6 0.5   Differential Method  Automated Automated Automated       Metabolic Panel (last 72 hours):  Recent Labs   Lab Result Units 03/30/22  1656 03/31/22  0040 03/31/22  0922 03/31/22  1620 04/01/22  0245   Sodium mmol/L 136 136 134*  --  134*   Potassium mmol/L 7.2* 4.8 5.7* 5.0 4.9   Chloride mmol/L 92* 96 94*  --  97   CO2 mmol/L 25 21* 27  --  20*   Glucose mg/dL 130* 113* 130*  --  119*   BUN mg/dL 70* 38* 43*  --  33*   Creatinine mg/dL 9.0*  6.0* 6.8*  --  5.5*   Albumin g/dL 3.8  --  3.5  --   --    Total Bilirubin mg/dL 0.9  --  0.9  --   --    Alkaline Phosphatase U/L 98  --  83  --   --    AST U/L 36  --  55*  --   --    ALT U/L 20  --  21  --   --    Magnesium mg/dL 2.1  --   --   --   --    Phosphorus mg/dL 9.1*  --   --   --   --        Drug levels (last 3 results):  No results for input(s): VANCOMYCINRA, VANCOMYCINPE, VANCOMYCINTR in the last 72 hours.    Microbiologic Results:  Microbiology Results (last 7 days)     Procedure Component Value Units Date/Time    Blood culture [[id number]]     Order Status: Sent Specimen: Blood     Blood culture [[id number]]     Order Status: Sent Specimen: Blood

## 2022-04-02 NOTE — CARE UPDATE
Saw pt at bedside, on examination pt is hypothermic, edematous, bradycardic and hypotensive. She has been transitioned from iHD to SLED d/t her rapid deterioration.   2200 labs (prior to SLED)   Lab Results   Component Value Date/Time     (L) 04/01/2022 10:00 PM     (L) 04/01/2022 10:00 PM     (A) 07/07/2021 12:00 AM    K 6.5 (HH) 04/01/2022 10:00 PM    K 6.5 (HH) 04/01/2022 10:00 PM    K 3.8 07/07/2021 12:00 AM    CL 95 04/01/2022 10:00 PM    CL 95 04/01/2022 10:00 PM    CO2 25 04/01/2022 10:00 PM    CO2 25 04/01/2022 10:00 PM    BUN 46 (H) 04/01/2022 10:00 PM    BUN 46 (H) 04/01/2022 10:00 PM    BUN 30 (A) 07/07/2021 12:00 AM    CREATININE 6.6 (H) 04/01/2022 10:00 PM    CREATININE 6.6 (H) 04/01/2022 10:00 PM    CREATININE 4.74 (A) 07/07/2021 12:00 AM     Plan:     -Continuous SLED, repeat labs at 0200, keep pt net-even and/or -100ml/hr if tolerated, call/page if there's any questions about this pt's RRT  -pt is currently full code, would encourage goals of care discussion with pt's POA as pt is at high mortality undergoing RRT given her current state of health      Jeri Vasquez M.D.   Nephrology  [facility]   Pager: [phone]

## 2022-04-02 NOTE — CARE UPDATE
Called son, Charan King to update him on the plan of care for his mom. Questions were sought and answered. Son understands the plan and would still like his mom to be full code at this time    Lily Bourgeois,   Internal Medicine, PGY 3  CCU Service

## 2022-04-02 NOTE — PROGRESS NOTES
04/01/22 2340   Treatment   Treatment Type SLED   Treatment Status New start   Dialysis Machine Number [id number]   Dialyzer Time (hours) 0   BVP (Liters) 0 L   Solutions Labeled and Current  Yes   Access Right;IJ;Temporary Cath   Catheter Dressing Intact  Yes   Alarms Engaged Yes   CRRT Comments SLED started   Prescription   Time (Hours) Continuous   Dialysate K + (mEq/L) 4   Dialysate CA + (mEq/L) 2.25   Dialysate HCO3 - (Bicarb) (mEq/L) 35   Dialysate Na + (mEq/L) 138   Cartridge Type Other  (r300)   Dialysate Flow Rate (mL/min) 200   UF Goal Rate 300 mL/hr   CRRT Hourly Documentation   Blood Flow (mL/min) 200   UF Rate 200 cc/hr   Arterial Pressure (mmHg) -50 mmHg   Venous Pressure (mmHg) 70 mmHg   Effluent Pressure (EP) (mmHg) 30 mmHg     SLED started. UF rate set at 200. Report given to primary nurse.

## 2022-04-02 NOTE — ASSESSMENT & PLAN NOTE
ICM NYHA Class III Acute on Chronic Systolic and diastolic HF Stage C: Structural heart disease with prior or current symptoms of HF.  - Most recent TTE demonstrates: LVef 28 %, Grade II DD - Mild AS Aortic valve area is 1.62 cm2; peak velocity is 1.46 m/s; mean gradient is 4 mmHg. CVP 15   - EKG : NSR with QTc 510  - Troponin 24.436  BNP 4,554     Hemodynamics 4/2/22:  CO = 6.81  CI = 3.22  SVR = 728.34   on Levo 0.06   CVP 17  SVO2 66    PLAN:  - Fluid restriction at 1500 cc with strict I/Os and daily STANDING weights  - Maintain on telemetry    - Up to date risk stratification : TSH, Lipids, HbA1c with optimization of risk factors is necessary  - Check Electrolytes, keep Mag >2 & K+ >4  - SCDs, TEDs, Nursing communication to elevated LE   - Ambulate as tolerated    - Optimal therapy at this time to include :    - Anti-platelet agent with ASA + ticagalor    - ACE/ARB hold at this time on pressors    - BB  hold at this time on pressors and up titrate as hemodynamics allow    - Statin with liptor 80 mg QD      - Follow up transitional care visit for heart failure at discharge

## 2022-04-02 NOTE — CONSULTS
"[facility] - Cardiac Intensive Care  Adult Nutrition  Consult Note    SUMMARY     Recommendations    1. If/when medically feasible, initiate enteral nutrition.   - With current Propofol rate, rec'd Peptamen Intense VHP @ 40 mL/hr to provide 1506 kcals (546 from Propofol), 88 g of protein, 806 mL fluid.   - When Propofol discontinued, rec'd Novasource @ 35 mL/hr to provide 1680 kcals, 76 g of protein, 602 mL fluid.  2. RD to monitor & follow-up.    Goals: Meet % EEN, EPN by RD f/u date  Nutrition Goal Status: new  Communication of RD Recs: reviewed with RN    Assessment and Plan    Nutrition Problem:  Inadequate energy intake    Related to (etiology):   Inability to consume sufficient energy    Signs and Symptoms (as evidenced by):   NPO    Interventions(treatment strategy):  Collaboration of nutrition care w/ other providers  Enteral nutrition     Nutrition Diagnosis Status:   New    Reason for Assessment    Reason For Assessment: consult  Diagnosis: other (see comments) (NSTEMI)  Relevant Medical History: Ca, COPD, ESRD on HD, HTN, DM, L. foot amputation  Interdisciplinary Rounds: did not attend    General Information Comments: Pt emergently intubated yesterday. Currently sedated & receiving CRRT. No family at bedside. Prior to intubation, pt tolerating Pureed diet w/ adequate PO intake (per RN documentation). Unsure of PO intake PTA; UBW: 220# per chart review. Appears nourished w/ no indicators of malnutrition. Will monitor.  Nutrition Discharge Planning: Pending clinical course    Nutrition/Diet History    Factors Affecting Nutritional Intake: on mechanical ventilation, NPO    Anthropometrics    Temp: 98.4 °F (36.9 °C)  Height Method: Stated  Height: 5' 4" (162.6 cm)  Height (inches): 64 in  Weight Method: Bed Scale  Weight: 98.4 kg (216 lb 14.9 oz)  Weight (lb): 216.93 lb  Ideal Body Weight (IBW), Female: 120 lb  % Ideal Body Weight, Female (lb): 180.78 %  BMI (Calculated): 37.2  BMI Grade: 35 - 39.9 - " obesity - grade II    Lab/Procedures/Meds    Pertinent Labs Reviewed: reviewed  Pertinent Labs Comments: Na 134, Creat 3.2, GFR 14.3, A1C 5.9  Pertinent Medications Reviewed: reviewed  Pertinent Medications Comments: Fentanyl, Levophed, Propofol    Estimated/Assessed Needs    Weight Used For Calorie Calculations: 98.4 kg (216 lb 14.9 oz)     Energy Calorie Requirements (kcal): 1549 kcal/d  Energy Need Method: [place] State (modified)     Protein Requirements:  g/d (1.5-2 g/kg IBW)  Weight Used For Protein Calculations: 54.5 kg (120 lb 2.4 oz)     Estimated Fluid Requirement Method: other (see comments) (Per MD or 1 mL/kcal)  RDA Method (mL): 1549     CHO Requirement: 194g    Nutrition Prescription Ordered    Current Diet Order: NPO    Evaluation of Received Nutrient/Fluid Intake    Other Calories (kcal): 546 (Propofol)    I/O: -6.7L since admit    Comments: LBM: 3/30    Nutrition Risk    Level of Risk/Frequency of Follow-up: (1x/week)     Monitor and Evaluation    Food and Nutrient Intake: energy intake, food and beverage intake, enteral nutrition intake  Food and Nutrient Adminstration: diet order, enteral and parenteral nutrition administration  Physical Activity and Function: nutrition-related ADLs and IADLs  Anthropometric Measurements: weight, weight change  Biochemical Data, Medical Tests and Procedures: glucose/endocrine profile, lipid profile, inflammatory profile, electrolyte and renal panel, gastrointestinal profile  Nutrition-Focused Physical Findings: overall appearance     Nutrition Follow-Up    RD Follow-up?: Yes

## 2022-04-02 NOTE — ASSESSMENT & PLAN NOTE
ESRD-HD  Recent Labs   Lab 04/01/22  2200 04/02/22  0211 04/02/22  0550   *  134* 133*  134* 134*   K 6.5*  6.5* 5.1  5.1 4.6   CL 95  95 97  98 97   CO2 25  25 24  25 26   BUN 46*  46* 28*  27* 16   CREATININE 6.6*  6.6* 4.4*  4.3* 3.2*   CALCIUM 8.3*  8.3* 8.1*  7.9* 8.2*   PHOS 9.1*  9.1* 5.4*  5.3* 3.9       INPUT/OUTPUT  I/O last 3 completed shifts:  In: 3141.8 [I.V.:2524.9; IV Piggyback:617]  Out: 2265 [Other:2265]  -----------------------------------  I/O this shift:  In: 355.1 [I.V.:355.1]  Out: 1449 [Other:1449]    Dialysis for metabolic clearance and volume management provided today.   Ultrafiltration goal: Liters: 1-2L  Duration: 3 hours  Labs have been reviewed.  Dialysate adjusted to current labs.  -Monitor for complications and adverse events.  -Continue to monitor intake and output.  -Daily weights.  -Pre and Post-HD weights to determine EDW.   -Avoid nephrotoxic medication and renal dose medications to GFR.  -Low NA, K, PO4 diet.  -Will continue to monitoring.     Mineral Bone Disease in CKD   Lab Results   Component Value Date    .0 (H) 07/14/2021    CALCIUM 8.2 (L) 04/02/2022    PHOS 3.9 04/02/2022       -PO4, Mg and Calcium levels.   -Renal diet with protein intake goal 1.5 g/kg/d.  -Novasource with meals.  -Daily renal panel to monitor phosphate and albumin.  -Continue sevelamer     Anemia of Chronic Kidney Disease   Recent Labs   Lab 03/31/22  0922 04/01/22  0245 04/01/22  1839 04/02/22 0211   WBC 3.57* 3.93  --  8.03   HGB 11.5* 10.4*  --  11.1*   HCT 38.0 35.8* 35* 36.4*   * 114*  --  213     Lab Results   Component Value Date    IRON 42 11/25/2021    TIBC 268 11/25/2021    FERRITIN 919 (H) 08/05/2021        --Maintain Hb>7.0 g/dL. -Goal in ESRD is Hgb of 10-11.  -Iron studies in AM. -10  -Maintain Hb>7g/dL  -Goal in ESRD is Hgb of 10-11.     Arterial Hypertension    -Normotensive 140   -No lab stick or BP intake on access  site.    Hyperlipidemia    -Continue current meds.

## 2022-04-02 NOTE — PROGRESS NOTES
[facility] - Cardiac Intensive Care  Cardiology  Progress Note    Patient Name: Kylie King  MRN: [id number]  Admission Date: 3/30/2022  Hospital Length of Stay: 3 days  Code Status: Full Code   Attending Physician: Gisela Lyles MD   Primary Care Physician: Virginia Foote MD  Expected Discharge Date: 4/4/2022  Principal Problem:NSTEMI (non-ST elevated myocardial infarction)    Subjective:     Hospital Course:   She was subsequently admitted to ED for urgent HD however noted to have NSTEMI and admitted to hospital medicine. Trop notable for rise 4--> 25 with EKG showng IVCD/LBBB and TTE with worsening EF 40%--> 28% and RWMA in LAD and Lcx territory. Per my exam, patient asymptomatic however she was also asymptomatic with last NSTEMI presentation. She was loaded with ASA and plavix and on hep gtt however labwork notable for persistent hyperkalemia K 5.7 Hg 12 Plt 168. She underwent HD on 4/1 but still had persistent hypervolemia. Went for an angiogram on 4/1 due to NSTEMI. Troponin increased to 25. Before cath was started patient went into resp distress and intubated. Placed on CRRT 4/1. Hemodynamics are not consistent with cardiogenic shock. Levo is stable at low doses. She was started on empiric therapy on 4/1.      Interval History: Patient underwent CTH to rule out stroke as she had persistent bradycardia and has been on AC/ anti-platelet. CTH negative for stroke. Started on empiric ABX and CRRT overnight.  Hemodynamics is not consistent with cardiogenic shock    Review of Systems   Unable to perform ROS: Intubated   Objective:     Vital Signs (Most Recent):  Temp: 98.4 °F (36.9 °C) (04/02/22 1101)  Pulse: (!) 49 (04/02/22 1331)  Resp: 20 (04/02/22 1331)  BP: (!) 86/41 (04/02/22 0904)  SpO2: 100 % (04/02/22 1331)   Vital Signs (24h Range):  Temp:  [95.9 °F (35.5 °C)-98.4 °F (36.9 °C)] 98.4 °F (36.9 °C)  Pulse:  [44-92] 49  Resp:  [7-20] 20  SpO2:  [87 %-100 %] 100 %  BP: ()/()  86/41  Arterial Line BP: (114-144)/(35-52) 114/35     Weight: 98.4 kg (216 lb 14.9 oz)  Body mass index is 37.24 kg/m².     SpO2: 100 %  O2 Device (Oxygen Therapy): ventilator      Intake/Output Summary (Last 24 hours) at 4/2/2022 1404  Last data filed at 4/2/2022 1201  Gross per 24 hour   Intake 3496.86 ml   Output 4098 ml   Net -601.14 ml       Lines/Drains/Airways       Central Venous Catheter Line  Duration             Trialysis (Dialysis) Catheter 04/01/22 2036 right internal jugular <1 day              Drain  Duration                  Hemodialysis AV Fistula 02/17/22 Right forearm 44 days         NG/OG Tube 04/01/22 1451 Center mouth <1 day              Airway  Duration                  Airway - Non-Surgical 04/01/22 1444 Endotracheal Tube <1 day       Airway Anesthesia 04/01/22 <1 day              Arterial Line  Duration             Arterial Line 04/01/22 1555 Left Radial <1 day              Peripheral Intravenous Line  Duration                  Peripheral IV - Single Lumen 03/31/22 2232 20 G Anterior;Left Forearm 1 day         Peripheral IV - Single Lumen 03/31/22 2232 20 G Anterior;Left;Proximal Upper Arm 1 day         Peripheral IV - Single Lumen 04/01/22 1500 20 G Left Antecubital <1 day                    Physical Exam  Vitals and nursing note reviewed.   Constitutional:       Appearance: Normal appearance. She is obese. She is not ill-appearing.   HENT:      Head: Normocephalic and atraumatic.   Cardiovascular:      Rate and Rhythm: Normal rate and regular rhythm.      Pulses: Normal pulses.      Heart sounds: Normal heart sounds. No murmur heard.    No friction rub. No gallop.   Pulmonary:      Effort: Respiratory distress present.      Breath sounds: Rales present.      Comments: intubated  Abdominal:      General: Abdomen is flat. Bowel sounds are normal. There is no distension.      Palpations: Abdomen is soft.      Tenderness: There is no abdominal tenderness.   Musculoskeletal:      Right lower  "leg: Edema present.      Left lower leg: Edema present.   Skin:     General: Skin is warm.   Neurological:      Comments: Sedated       Significant Labs: CMP   Recent Labs   Lab 04/01/22  2200 04/02/22  0211 04/02/22  0550   *  134* 133*  134* 134*   K 6.5*  6.5* 5.1  5.1 4.6   CL 95  95 97  98 97   CO2 25  25 24  25 26   GLU 98  98 127*  124* 104   BUN 46*  46* 28*  27* 16   CREATININE 6.6*  6.6* 4.4*  4.3* 3.2*   CALCIUM 8.3*  8.3* 8.1*  7.9* 8.2*   PROT  --  7.3  --    ALBUMIN 3.4*  3.4* 3.5  3.4* 3.4*   BILITOT  --  0.8  --    ALKPHOS  --  93  --    AST  --  29  --    ALT  --  23  --    ANIONGAP 14  14 12  11 11   ESTGFRAFRICA 6.9*  6.9* 11.2*  11.5* 16.5*   EGFRNONAA 6.0*  6.0* 9.7*  10.0* 14.3*   , CBC   Recent Labs   Lab 04/01/22  0245 04/01/22  1839 04/02/22  0211   WBC 3.93  --  8.03   HGB 10.4*  --  11.1*   HCT 35.8*   < > 36.4*   *  --  213    < > = values in this interval not displayed.   , Troponin No results for input(s): TROPONINI in the last 48 hours., and All pertinent lab results from the last 24 hours have been reviewed.    Significant Imaging:  All imaging reviewed in the last 24 hours    Assessment and Plan:     * NSTEMI (non-ST elevated myocardial infarction)  Tests     LDL:   Lab Results   Component Value Date    LDLCALC 33.6 (L) 02/09/2021        BNP:   Lab Results   Component Value Date    BNP 4,554 (H) 03/30/2022        TSH:   Lab Results   Component Value Date    TSH 2.540 10/30/2021        HbA1c:   Lab Results   Component Value Date    HGBA1C 5.9 (H) 11/03/2021    HGBA1C 7.8 10/09/2018       MELISSA score: 5  Luana score: 117    Previous [facility] 2/7/22:  · The Ost Cx to Prox Cx lesion was 80% stenosed.  · The 1st Mrg lesion was 60% stenosed.  · The 1st Diag lesion was 60% stenosed.  · The estimated blood loss was <50 mL.   She has LCX and Diag disease but the LCX is the most important lesion.  Because this is an ostial lesion, will do LAD/LCX "V" stenting.  "         PLAN:  - attempted LHC with IC on 4/1 but batient became acutely hypertenive, tachycardiac and hypoxic requiring the LHC to be aborted  - Maintain on telemetry and repeat EKG in the AM to look for any subtle changes  - Keep NPO for further investigation   - Up to date risk stratification labs to be ordered and addressed appropriately     --> TSH, Lipids, HbA1c    - ACS Core Measures     Heparin / Enoxaparin: heparin gtt     Aspirin + Brillinta     Beta-Blocker: on hold while on pressors     ACE/ARB: On hold      Statin: Atorvastatin         Hypertensive emergency  PLAN:  -- Resolved now on pressors    ESRD (end stage renal disease)  Undergoing CRRT since 4/1 CVP is 17 on 4/2     COPD without exacerbation  Patient is intubated    Type 2 diabetes mellitus with diabetic peripheral angiopathy and gangrene, with long-term current use of insulin     HbA1c:   Lab Results   Component Value Date    HGBA1C 5.9 (H) 11/03/2021    HGBA1C 7.8 10/09/2018     PLAN:   -- LDSSI + 2 U of detemir BID  -- Goal while inpatient 140-180  -- POC BG Q6H  -- RD for TF recs while intubated - will start 4/3 if still intubated      Atrial fibrillation  [id number] Scoring System Interpretation   (CHF, HTN, Age > 75 (2), DM II, Stroke, Vascular Disease Hx, Age > 65 (1), Sex Female)  [id number] Score  Adjusted Stroke Risk   0    0 %   1    1.3 %   2    2.2 %   3    3.2 %    4    4.0 %   5    6.7 %   6    9.8 %   7    9.6 %   8    6.7 %    9    15.2 %    HAS-BLED Scoring System Interpretation   (Hypertension, Abnormal Liver and Kidney Function, Stroke, Bleeding Tendency, Labile INR, Elderly, Drugs (EtOH and ASA/NSAIDs)    HAS-BLED Score  Bleeds per 100 patient years   0           1.13   1           1.02   2           1.88   3           3.74   4           8.70   5-9                 Insufficient Data      PLAN:  - Maintain K > 4, Mag > 2 and Ca/iCal WNL to decrease arrhythmogenic potential  - Cycle troponin to rule out ischemic etiology  -  Rhythm control on Amiodarone  - A/C control with heparin gtt    --> [id number] score 4    - Maintain on telemetry and daily morning    --> Please perform EKG if the patient converts spontaneously     Anemia in ESRD (end-stage renal disease)  Management per nephro    Acute on chronic respiratory failure  Hypervolemia controlled with HD  Patient is currently intubated    End-stage renal disease on hemodialysis  Patient is ESRD on HD TTS has right upper ext forearm fistula. upgraded to ICU today   Last HD was done 3/31/22 with with 2L out   Patient has been neg 4 Liters since admission     She went to cath lab 4/1 for MI and stent thrombosis for recent stent that she had 3 weeks ago. Before cath was started patient went into resp distress and intubated. Patient is hypervolemic and hypertensive.       PLAN:  -- plans for addition HD this evening 4/1     Acute on chronic combined systolic and diastolic heart failure  ICM NYHA Class III Acute on Chronic Systolic and diastolic HF Stage C: Structural heart disease with prior or current symptoms of HF.  - Most recent TTE demonstrates: LVef 28 %, Grade II DD - Mild AS Aortic valve area is 1.62 cm2; peak velocity is 1.46 m/s; mean gradient is 4 mmHg. CVP 15   - EKG : NSR with QTc 510  - Troponin 24.436  BNP 4,554     Hemodynamics 4/2/22:  CO = 6.81  CI = 3.22  SVR = 728.34   on Levo 0.06   CVP 17  SVO2 66    PLAN:  - Fluid restriction at 1500 cc with strict I/Os and daily STANDING weights  - Maintain on telemetry    - Up to date risk stratification : TSH, Lipids, HbA1c with optimization of risk factors is necessary  - Check Electrolytes, keep Mag >2 & K+ >4  - SCDs, TEDs, Nursing communication to elevated LE   - Ambulate as tolerated    - Optimal therapy at this time to include :    - Anti-platelet agent with ASA + ticagalor    - ACE/ARB hold at this time on pressors    - BB  hold at this time on pressors and up titrate as hemodynamics allow    - Statin with liptor 80 mg QD       - Follow up transitional care visit for heart failure at discharge    History of breast cancer  Per oncology - last seen By Dr. Mclain in 2015  carcinoma of the left breast T2N0 Stage IIA Strongly ER and FL positive, HER-2 negative.  Oncotype .Score returned at 6 indicating a low risk and a 5% relapse rate with hormonal therapy alone.She initiated Letrozole hormonal therapy in April 2013.  .    She had developed a bloody nipple discharge in the latter part of 2012 and a mammogram on December 31 showed asymmetric densities in the left breast at the 5:00 position extending posteriorly.  Ultrasound showed several solid masses extending into the lower outer quadrant.  On January 8 ultrasound-guided biopsy showed an infiltrating ductal carcinoma intermediate grade (3+2+1), 100%  ER-positive, 100%  FL positive, HER-2 1+.  On January 25 right and left mastectomy were performed which showed a 4.4 cm intraductal carcinoma grade 2 with positive deep margin involving skeletal muscle the pectoralis.  [place] lymph node showed multifocal clusters of tumor cells all less than 1 her soles, final pathological stage T2 N0 (IHC positive) stage II A. The right breast was unremarkable pathologically.    Hyperlipidemia     LDL:   Lab Results   Component Value Date    LDLCALC 33.6 (L) 02/09/2021       PLAN:  -- Continue High intensity statin         VTE Risk Mitigation (From admission, onward)         Ordered     heparin (porcine) injection 1,000 Units  As needed (PRN)         04/01/22 1523     Reason for No Pharmacological VTE Prophylaxis  Once        Question:  Reasons:  Answer:  Already adequately anticoagulated on oral Anticoagulants    04/01/22 1525     IP VTE HIGH RISK PATIENT  Once         04/01/22 1525     heparin 25,000 units in dextrose 5% (100 units/ml) IV bolus from bag - ADDITIONAL PRN BOLUS - 60 units/kg (max bolus 4000 units)  As needed (PRN)        Question:  Heparin Infusion Adjustment (DO NOT MODIFY ANSWER)  Answer:   [email or web address]\epic\Images\Pharmacy\HeparinInfusions\heparin LOW INTENSITY nomogram for [facility] [id number].pdf    03/30/22 1925     heparin 25,000 units in dextrose 5% (100 units/ml) IV bolus from bag - ADDITIONAL PRN BOLUS - 30 units/kg (max bolus 4000 units)  As needed (PRN)        Question:  Heparin Infusion Adjustment (DO NOT MODIFY ANSWER)  Answer:  [email or web address]\[email or web address]\Images\Pharmacy\HeparinInfusions\heparin LOW INTENSITY nomogram for [facility] [id number].pdf    03/30/22 1925     heparin 25,000 units in dextrose 5% 250 mL (100 units/mL) infusion LOW INTENSITY nomogram - [facility]  Continuous        Question Answer Comment   Heparin Infusion Adjustment (DO NOT MODIFY ANSWER) [email or web address]\epic\Images\Pharmacy\HeparinInfusions\heparin LOW INTENSITY nomogram for [facility] [id number].pdf    Begin at (in units/kg/hr) 12        03/30/22 1925     Place sequential compression device  Until discontinued         03/30/22 1922                Lily Bourgeois DO  Cardiology  [facility] - Cardiac Int[facility]

## 2022-04-02 NOTE — SUBJECTIVE & OBJECTIVE
Interval History: Patient underwent CTH to rule out stroke as she had persistent bradycardia and has been on AC/ anti-platelet. CTH negative for stroke. Started on empiric ABX and CRRT overnight.  Hemodynamics is not consistent with cardiogenic shock    Review of Systems   Unable to perform ROS: Intubated   Objective:     Vital Signs (Most Recent):  Temp: 98.4 °F (36.9 °C) (04/02/22 1101)  Pulse: (!) 49 (04/02/22 1331)  Resp: 20 (04/02/22 1331)  BP: (!) 86/41 (04/02/22 0904)  SpO2: 100 % (04/02/22 1331)   Vital Signs (24h Range):  Temp:  [95.9 °F (35.5 °C)-98.4 °F (36.9 °C)] 98.4 °F (36.9 °C)  Pulse:  [44-92] 49  Resp:  [7-20] 20  SpO2:  [87 %-100 %] 100 %  BP: ()/() 86/41  Arterial Line BP: (114-144)/(35-52) 114/35     Weight: 98.4 kg (216 lb 14.9 oz)  Body mass index is 37.24 kg/m².     SpO2: 100 %  O2 Device (Oxygen Therapy): ventilator      Intake/Output Summary (Last 24 hours) at 4/2/2022 1404  Last data filed at 4/2/2022 1201  Gross per 24 hour   Intake 3496.86 ml   Output 4098 ml   Net -601.14 ml       Lines/Drains/Airways       Central Venous Catheter Line  Duration             Trialysis (Dialysis) Catheter 04/01/22 2036 right internal jugular <1 day              Drain  Duration                  Hemodialysis AV Fistula 02/17/22 Right forearm 44 days         NG/OG Tube 04/01/22 1451 Center mouth <1 day              Airway  Duration                  Airway - Non-Surgical 04/01/22 1444 Endotracheal Tube <1 day       Airway Anesthesia 04/01/22 <1 day              Arterial Line  Duration             Arterial Line 04/01/22 1555 Left Radial <1 day              Peripheral Intravenous Line  Duration                  Peripheral IV - Single Lumen 03/31/22 2232 20 G Anterior;Left Forearm 1 day         Peripheral IV - Single Lumen 03/31/22 2232 20 G Anterior;Left;Proximal Upper Arm 1 day         Peripheral IV - Single Lumen 04/01/22 1500 20 G Left Antecubital <1 day                    Physical Exam  Vitals  and nursing note reviewed.   Constitutional:       Appearance: Normal appearance. She is obese. She is not ill-appearing.   HENT:      Head: Normocephalic and atraumatic.   Cardiovascular:      Rate and Rhythm: Normal rate and regular rhythm.      Pulses: Normal pulses.      Heart sounds: Normal heart sounds. No murmur heard.    No friction rub. No gallop.   Pulmonary:      Effort: Respiratory distress present.      Breath sounds: Rales present.      Comments: intubated  Abdominal:      General: Abdomen is flat. Bowel sounds are normal. There is no distension.      Palpations: Abdomen is soft.      Tenderness: There is no abdominal tenderness.   Musculoskeletal:      Right lower leg: Edema present.      Left lower leg: Edema present.   Skin:     General: Skin is warm.   Neurological:      Comments: Sedated       Significant Labs: CMP   Recent Labs   Lab 04/01/22  2200 04/02/22  0211 04/02/22  0550   *  134* 133*  134* 134*   K 6.5*  6.5* 5.1  5.1 4.6   CL 95  95 97  98 97   CO2 25  25 24  25 26   GLU 98  98 127*  124* 104   BUN 46*  46* 28*  27* 16   CREATININE 6.6*  6.6* 4.4*  4.3* 3.2*   CALCIUM 8.3*  8.3* 8.1*  7.9* 8.2*   PROT  --  7.3  --    ALBUMIN 3.4*  3.4* 3.5  3.4* 3.4*   BILITOT  --  0.8  --    ALKPHOS  --  93  --    AST  --  29  --    ALT  --  23  --    ANIONGAP 14  14 12  11 11   ESTGFRAFRICA 6.9*  6.9* 11.2*  11.5* 16.5*   EGFRNONAA 6.0*  6.0* 9.7*  10.0* 14.3*   , CBC   Recent Labs   Lab 04/01/22  0245 04/01/22  1839 04/02/22  0211   WBC 3.93  --  8.03   HGB 10.4*  --  11.1*   HCT 35.8*   < > 36.4*   *  --  213    < > = values in this interval not displayed.   , Troponin No results for input(s): TROPONINI in the last 48 hours., and All pertinent lab results from the last 24 hours have been reviewed.    Significant Imaging:  All imaging reviewed in the last 24 hours

## 2022-04-02 NOTE — ASSESSMENT & PLAN NOTE
Patient is ESRD on HD TTS has right upper ext forearm fistula.     She went to cath lab yesterday for MI and stent thrombosis for recent stent that she had 3 weeks ago. Before cath was started patient went into resp distress and intubated. Patient is hypervolemic and hypertensive.   Labs this morning did now show any emergent electrolyte or acid/base abnormalities.     Will continue with SCUF with UF of 400.

## 2022-04-02 NOTE — ASSESSMENT & PLAN NOTE
[id number] Scoring System Interpretation   (CHF, HTN, Age > 75 (2), DM II, Stroke, Vascular Disease Hx, Age > 65 (1), Sex Female)  [id number] Score  Adjusted Stroke Risk   0    0 %   1    1.3 %   2    2.2 %   3    3.2 %    4    4.0 %   5    6.7 %   6    9.8 %   7    9.6 %   8    6.7 %    9    15.2 %    HAS-BLED Scoring System Interpretation   (Hypertension, Abnormal Liver and Kidney Function, Stroke, Bleeding Tendency, Labile INR, Elderly, Drugs (EtOH and ASA/NSAIDs)    HAS-BLED Score  Bleeds per 100 patient years   0           1.13   1           1.02   2           1.88   3           3.74   4           8.70   5-9                 Insufficient Data      PLAN:  - Maintain K > 4, Mag > 2 and Ca/iCal WNL to decrease arrhythmogenic potential  - Cycle troponin to rule out ischemic etiology  - Rhythm control on Amiodarone  - A/C control with heparin gtt    --> [id number] score 4    - Maintain on telemetry and daily morning    --> Please perform EKG if the patient converts spontaneously

## 2022-04-02 NOTE — NURSING
Pt was an emergent transfer to ICU from cath lab due to respiratory distress.  Pt intubated and sedated  Prop, fentanyl, and cardene started  L Art and OG tube Placed  Pt has pressure injury on R 3rd toe, L foot partial amputation, and R 1st digit amputation.   Pt breathing pattern changed @ 1800 with decreased breath sounds and accessory breathing.   MD notified, CXR, and ABGs ordered  Vent setting changes were made.  Pt will get HD tonight.

## 2022-04-02 NOTE — CARE UPDATE
Overnight Update    Patient developed worsening bradycardia and hypotension throughout the evening. EKG with sinus bradycardia and 1st degree AVB. Given hemodynamic instability, started on Levophed gtt and placed CVC. CXR with L>R airspace opacities and given respiratory decline empirically started broad spectrum Abx after collecting BCx.     Hemodynamics: On Levophed gtt @ 0.03-0.04    SVO2 66  CVP 16  CO 7.3  CI 3.4      Patient also anuric with hyperkalemia (K 6.5) and given hypotension decided to contact nephrology to adjust HD to SLED overnight. CVC noted to be a little deep on CXR, but no ectopy and tolerating SLED without issue.    Plan:  - Maintain MAP > 65 mmHg while on SLED; up-titrate Levophed PRN  - Continue Vanc/Zosyn and will follow-up infectious work-up  - SLED overnight for volume removal (CVP 16) and clearance (K 6.5)  - Consider pulling CVC back 2-3 cm if needed  - Repeat SVO2, CVP, and hemodynamics in the morning    Min Whitehead MD PGY4  Cardiovascular Medicine Fellow  [facility]  Pager: [phone]

## 2022-04-02 NOTE — PLAN OF CARE
CICU DAILY GOALS       A: Awake    RASS: Goal -    Actual - RASS (Sanchez Agitation-Sedation Scale): -3-->moderate sedation   Restraint necessity: Clinical Justification: Treatment Interference  B: Breath   SBT: {SBT pass/fail:66807}   C: Coordinate A & B, analgesics/sedatives   Pain: {Managed/ Uncontrolled:60039}    SAT: {SBT pass/fail:02218}  D: Delirium   CAM-ICU: Overall CAM-ICU: Positive  E: Early(intubated/ Progressive (non-intubated) Mobility   MOVE Screen: {Pass/Fail:44928}   Activity: Activity Management: Patient unable to perform activities  FAS: Feeding/Nutrition   Diet order: Diet/Nutrition Received: NPO,   Fluid restriction:    T: Thrombus   DVT prophylaxis: VTE Required Core Measure: Pharmacological prophylaxis initiated/maintained  H: HOB Elevation   Head of Bed (HOB) Positioning: HOB at 30-45 degrees  U: Ulcer Prophylaxis   GI: {YES/NO:77625}  G: Glucose control   {Managed/ Uncontrolled:01966}    S: Skin   Bundle compliance: {YES/NO:67943}   Bathing/Skin Care: bath, complete, dressed/undressed, electrode patches/site rotation Date: ***  B: Bowel Function   {bowel:19537}   I: Indwelling Catheters   Mace necessity:     CVC necessity: {YES:66405}   IPAD offered: {Ipad:03901}  D: De-escalation Antibx   {YES:65688}  Plan for the day   ***  Family/Goals of care/Code Status   Code Status: Full Code     No acute events throughout day, VS and assessment per flow sheet, patient progressing towards goals as tolerated, plan of care reviewed with Kylie King and family, all concerns addressed, will continue to monitor.

## 2022-04-02 NOTE — PROCEDURES
"Kylie King is a 66 y.o. female patient.    Temp: 97.5 °F (36.4 °C) (04/01/22 1435)  Pulse: (!) 47 (04/01/22 1947)  Resp: 18 (04/01/22 1237)  BP: (!) 106/51 (04/01/22 1947)  SpO2: 98 % (04/01/22 1947)  Weight: 98.4 kg (217 lb) (03/31/22 0826)  Height: 5' 4" (162.6 cm) (04/01/22 1700)  Mallampati Scale: Class II  ASA Classification: Class 2    Central Line    Date/Time: 4/1/2022 8:57 PM  Performed by: Min Whitehead MD  Authorized by: Min Whitehead MD     Location procedure was performed:  [facility] CARDIOLOGY  Consent Done ?:  Yes  Indications:  Hemodialysis, vascular access and hemodynamic monitoring  Anesthesia:  Local infiltration  Local anesthetic:  Lidocaine 1% without epinephrine  Preparation:  Skin prepped with ChloraPrep  Skin prep agent dried: Skin prep agent completely dried prior to procedure    Sterile barriers: All five maximal sterile barriers used - gloves, gown, cap, mask and large sterile sheet    Hand hygiene: Hand hygiene performed immediately prior to central venous catheter insertion    Location:  Right internal jugular  Catheter size:  13 Fr  Inserted Catheter Length (cm):  23  Ultrasound guidance: Yes    Needle advanced into vessel with real time ultrasound guidance.    Guidewire confirmed in vessel.    Steril sheath on probe.    Sterile gel used.  Manometry: No    Number of attempts:  1  Securement:  Line sutured, chlorhexidine patch, sterile dressing applied and blood return through all ports  Estimated blood loss (mL):  5  XRay:  Successful placement  Adverse Events:  None  Other Complications:  Will follow-up CXR for placement verification   Will follow-up CXR for placement verification        4/1/2022  "

## 2022-04-02 NOTE — PROGRESS NOTES
Pharmacokinetic Assessment Follow Up: IV Vancomycin     Vancomycin serum concentration assessment(s):     Vancomycin random level today is 26.2.   Nephrology - SLED restarted.   This was drawn approximately 4 hours post dose and can be used to guide therapy. Ok to hold dose today, recheck AM random tomorrow.   The measurement is above the desired definitive target range of 15 to 20 mcg/mL.  Plan to monitor AM random levels, and redose when <20.     Griselda CuellarD, [facility]  Heart Transplant Clinical Specialist   Spectralink: [id number]    Drug levels (last 3 results):  Recent Labs   Lab Result Units 04/02/22  0211   Vancomycin, Random ug/mL 26.2        Patient brief summary:  Kylie King is a 66 y.o. female initiated on antimicrobial therapy with IV Vancomycin for treatment of lower respiratory infection    Drug Allergies:   Review of patient's allergies indicates:   Allergen Reactions    Cyclobenzaprine Hallucinations    Erythromycin      Stomach upset    Keflex [cephalexin]      Yeast infection    Erythromycin base      Other reaction(s): upset stomach       Actual Body Weight:   98.4 kg     Renal Function:   Estimated Creatinine Clearance: 19.4 mL/min (A) (based on SCr of 3.2 mg/dL (H)).,     Dialysis Method (if applicable):  SLED    CBC (last 72 hours):  Recent Labs   Lab Result Units 03/30/22  1656 03/31/22 0922 04/01/22  0245 04/02/22  0211   WBC K/uL 4.91 3.57* 3.93 8.03   Hemoglobin g/dL 12.0 11.5* 10.4* 11.1*   Hematocrit % 38.4 38.0 35.8* 36.4*   Platelets K/uL 168 137* 114* 213   Gran % % 78.4* 66.6 68.9 75.4*   Lymph % % 14.7* 24.1 22.9 15.7*   Mono % % 5.9 7.6 6.4 7.7   Eosinophil % % 0.2 0.8 1.0 0.4   Basophil % % 0.6 0.6 0.5 0.6   Differential Method  Automated Automated Automated Automated       Metabolic Panel (last 72 hours):  Recent Labs   Lab Result Units 03/30/22  1656 03/31/22  0040 03/31/22  0922 03/31/22  1620 04/01/22  0245 04/01/22  2200 04/02/22  0211 04/02/22  0550   Sodium  mmol/L 136 136 134*  --  134* 134*  134* 133*  134* 134*   Potassium mmol/L 7.2* 4.8 5.7* 5.0 4.9 6.5*  6.5* 5.1  5.1 4.6   Chloride mmol/L 92* 96 94*  --  97 95  95 97  98 97   CO2 mmol/L 25 21* 27  --  20* 25  25 24  25 26   Glucose mg/dL 130* 113* 130*  --  119* 98  98 127*  124* 104   BUN mg/dL 70* 38* 43*  --  33* 46*  46* 28*  27* 16   Creatinine mg/dL 9.0* 6.0* 6.8*  --  5.5* 6.6*  6.6* 4.4*  4.3* 3.2*   Albumin g/dL 3.8  --  3.5  --   --  3.4*  3.4* 3.5  3.4* 3.4*   Total Bilirubin mg/dL 0.9  --  0.9  --   --   --  0.8  --    Alkaline Phosphatase U/L 98  --  83  --   --   --  93  --    AST U/L 36  --  55*  --   --   --  29  --    ALT U/L 20  --  21  --   --   --  23  --    Magnesium mg/dL 2.1  --   --   --   --   --  2.0  --    Phosphorus mg/dL 9.1*  --   --   --   --  9.1*  9.1* 5.4*  5.3* 3.9       Vancomycin Administrations:  vancomycin given in the last 96 hours                   vancomycin 2 g in dextrose 5 % 500 mL IVPB (mg) 2,000 mg New Bag 04/01/22 2229                Microbiologic Results:  Microbiology Results (last 7 days)     Procedure Component Value Units Date/Time    Blood culture [[id number]] Collected: 04/01/22 2218    Order Status: Completed Specimen: Blood from Peripheral, Upper Arm, Left Updated: 04/02/22 0715     Blood Culture, Routine No Growth to date    Narrative:      Collection has been rescheduled by CLEMENTINA at 04/01/2022 20:22 Reason:   Patient unavailable  Collection has been rescheduled by CLEMENTINA at 04/01/2022 20:22 Reason:   Patient unavailable    Blood culture [[id number]] Collected: 04/01/22 2219    Order Status: Completed Specimen: Blood from Peripheral, Upper Arm, Left Updated: 04/02/22 0715     Blood Culture, Routine No Growth to date    Narrative:      Collection has been rescheduled by CLEMENTINA at 04/01/2022 20:22 Reason:   Patient unavailable  Collection has been rescheduled by CLEMENTINA at 04/01/2022 20:22 Reason:   Patient unavailable

## 2022-04-02 NOTE — PLAN OF CARE
CICU DAILY GOALS       A: Awake    RASS: Goal - (-2)  Actual - RASS (Sanchez Agitation-Sedation Scale): -3-->moderate sedation   Restraint necessity: Clinical Justification: Treatment Interference  B: Breath   SBT: Not attempted   C: Coordinate A & B, analgesics/sedatives   Pain: managed    SAT: Not attempted  D: Delirium   CAM-ICU: Overall CAM-ICU: Positive  E: Early(intubated/ Progressive (non-intubated) Mobility   MOVE Screen: Fail   Activity: Activity Management: Patient unable to perform activities  FAS: Feeding/Nutrition   Diet order: Diet/Nutrition Received: NPO,   Fluid restriction:    T: Thrombus   DVT prophylaxis: VTE Required Core Measure: Pharmacological prophylaxis initiated/maintained  H: HOB Elevation   Head of Bed (HOB) Positioning: HOB at 30-45 degrees  U: Ulcer Prophylaxis   GI: yes  G: Glucose control   managed    S: Skin   Bundle compliance: yes   Bathing/Skin Care: bath, complete, dressed/undressed, electrode patches/site rotation Date: 04/01/2022  B: Bowel Function no issues     I: Indwelling Catheters   Mace necessity:     CVC necessity: No   IPAD offered: No  D: De-escalation Antibx   Yes  Plan for the day   CVP- 17,14,9. Unresponsive to pain in the AM. Attempted to titrate Levo gtt down. BP did not tolerate decreases. Patient became agitated and disruptive. Notified MD. Received order for restraints and fentanyl bolus Prn.  Family/Goals of care/Code Status   Code Status: Full Code     No acute events throughout day, VS and assessment per flow sheet, patient progressing towards goals as tolerated, plan of care reviewed with Kylie King and family, all concerns addressed, will continue to monitor.

## 2022-04-02 NOTE — PLAN OF CARE
Recommendations     1. If/when medically feasible, initiate enteral nutrition.              - With current Propofol rate, rec'd Peptamen Intense VHP @ 40 mL/hr to provide 1506 kcals (546 from Propofol), 88 g of protein, 806 mL fluid.              - When Propofol discontinued, rec'd Novasource @ 35 mL/hr to provide 1680 kcals, 76 g of protein, 602 mL fluid.  2. RD to monitor & follow-up.     Goals: Meet % EEN, EPN by RD f/u date  Nutrition Goal Status: new  Communication of RD Recs: reviewed with RN

## 2022-04-02 NOTE — ASSESSMENT & PLAN NOTE
HbA1c:   Lab Results   Component Value Date    HGBA1C 5.9 (H) 11/03/2021    HGBA1C 7.8 10/09/2018     PLAN:   -- LDSSI + 2 U of detemir BID  -- Goal while inpatient 140-180  -- POC BG Q6H  -- RD for TF recs while intubated - will start 4/3 if still intubated

## 2022-04-02 NOTE — SUBJECTIVE & OBJECTIVE
Interval History: Continues to be intubated. No acute events overnight. Was on UF of 400 that was decreased to 350 with CVP drop.    Review of patient's allergies indicates:   Allergen Reactions    Cyclobenzaprine Hallucinations    Erythromycin      Stomach upset    Keflex [cephalexin]      Yeast infection    Erythromycin base      Other reaction(s): upset stomach     Current Facility-Administered Medications   Medication Frequency    0.9%  NaCl infusion (CRRT USE ONLY) Continuous    0.9%  NaCl infusion Once    0.9%  NaCl infusion Once    0.9%  NaCl infusion Once    0.9%  NaCl infusion Continuous    acetaminophen tablet 650 mg Q8H PRN    amiodarone tablet 200 mg Daily    aspirin chewable tablet 81 mg Daily    atorvastatin tablet 80 mg Daily    chlorhexidine 0.12 % solution 15 mL BID    dextrose 10% bolus 125 mL PRN    famotidine (PF) injection 20 mg Daily    fentaNYL 2500 mcg in 0.9% sodium chloride 250 mL infusion premix (titrating) Continuous    gelatin adsorbable 12-7 mm top sponge sponge 1 applicator PRN    glucagon (human recombinant) injection 1 mg PRN    heparin (porcine) injection 1,000 Units PRN    heparin 25,000 units in dextrose 5% (100 units/ml) IV bolus from bag - ADDITIONAL PRN BOLUS - 30 units/kg (max bolus 4000 units) PRN    heparin 25,000 units in dextrose 5% (100 units/ml) IV bolus from bag - ADDITIONAL PRN BOLUS - 60 units/kg (max bolus 4000 units) PRN    heparin 25,000 units in dextrose 5% 250 mL (100 units/mL) infusion LOW INTENSITY nomogram - [facility] Continuous    insulin aspart U-100 pen 0-5 Units Q6H PRN    insulin detemir U-100 pen 2 Units BID    magnesium sulfate 2g in water 50mL IVPB (premix) PRN    melatonin tablet 6 mg Nightly    miconazole NITRATE 2 % top powder BID    midazolam (PF) injection PRN    mupirocin 2 % ointment BID    naloxone 0.4 mg/mL injection 0.02 mg PRN    NORepinephrine bitartrate 8 mg in dextrose 5% 250 mL infusion Continuous    piperacillin-tazobactam 4.5 g in sodium  chloride 0.9% 100 mL IVPB (ready to mix system) Q8H    polyethylene glycol packet 17 g BID PRN    propofol (DIPRIVAN) 10 mg/mL infusion Continuous    sodium chloride 0.9% bolus 250 mL PRN    sodium chloride 0.9% bolus 250 mL PRN    sodium chloride 0.9% bolus 250 mL PRN    sodium chloride 0.9% flush 10 mL PRN    ticagrelor tablet 90 mg BID    vancomycin - pharmacy to dose pharmacy to manage frequency       Objective:     Vital Signs (Most Recent):  Temp: 98.4 °F (36.9 °C) (04/02/22 1101)  Pulse: (!) 48 (04/02/22 1135)  Resp: 20 (04/02/22 1135)  BP: (!) 86/41 (04/02/22 0904)  SpO2: 99 % (04/02/22 1135)  O2 Device (Oxygen Therapy): ventilator (04/02/22 1135)   Vital Signs (24h Range):  Temp:  [95.9 °F (35.5 °C)-98.4 °F (36.9 °C)] 98.4 °F (36.9 °C)  Pulse:  [44-92] 48  Resp:  [10-20] 20  SpO2:  [87 %-100 %] 99 %  BP: ()/() 86/41  Arterial Line BP: (125-144)/(41-52) 133/41     Weight: 98.4 kg (216 lb 14.9 oz) (04/02/22 1100)  Body mass index is 37.24 kg/m².  Body surface area is 2.11 meters squared.    I/O last 3 completed shifts:  In: 3141.8 [I.V.:2524.9; IV Piggyback:617]  Out: 2265 [Other:2265]    Physical Exam  Vitals reviewed.   Constitutional:       General: She is not in acute distress.     Appearance: She is obese. She is ill-appearing.      Comments: Sedated and intubated    HENT:      Mouth/Throat:      Mouth: Mucous membranes are dry.   Eyes:      General: No scleral icterus.     Conjunctiva/sclera: Conjunctivae normal.      Pupils: Pupils are equal, round, and reactive to light.   Cardiovascular:      Rate and Rhythm: Normal rate and regular rhythm.      Heart sounds: Normal heart sounds.   Pulmonary:      Effort: Pulmonary effort is normal.      Breath sounds: Normal breath sounds.      Comments: Intubated   B/L air entry   Abdominal:      General: Bowel sounds are normal.      Palpations: Abdomen is soft.   Musculoskeletal:      Right lower leg: No edema.      Left lower leg: No edema.   Skin:      Coloration: Skin is not jaundiced.      Findings: No rash.   Neurological:      Comments: Sedated        Significant Labs:  All labs within the past 24 hours have been reviewed.     Significant Imaging:  Labs: Reviewed

## 2022-04-02 NOTE — EICU
Rounding (Video Assessment):  No    Intervention Initiated From:  Bedside    Vonnie Communicated with Bedside Nurse regarding:  Time-Out    Nurse Notified:  Yes    Doctor Notified:  No    Comments: 2018 eLert noted for time out prior to R IJ trialysis cath placement per Dr Whitehead. Consent obtained. Time out procedure completed. Line placement completed x 2 stick, chest xray ordered. Pt tolerated well      End time: 20:42

## 2022-04-02 NOTE — PROGRESS NOTES
Pharmacist Renal Dose Adjustment Note    Kylie King is a 66 y.o. female being treated with the medication piperacillin/tazobactam.    Patient Data:    Vital Signs (Most Recent):  Temp: 98.2 °F (36.8 °C) (04/02/22 0701)  Pulse: (!) 48 (04/02/22 0801)  Resp: 16 (04/02/22 0801)  BP: (!) 107/53 (04/02/22 0801)  SpO2: 100 % (04/02/22 0801)   Vital Signs (72h Range):  Temp:  [95.9 °F (35.5 °C)-98.5 °F (36.9 °C)]   Pulse:  []   Resp:  [10-30]   BP: ()/()   SpO2:  [87 %-100 %]   Arterial Line BP: (141-144)/(51-52)      Recent Labs   Lab 04/01/22  2200 04/02/22  0211 04/02/22  0550   CREATININE 6.6*  6.6* 4.4*  4.3* 3.2*     Serum creatinine: 3.2 mg/dL (H) 04/02/22 0550  Estimated creatinine clearance: 19.4 mL/min (A)    Medication:piperacillin/tazobactam frequency increased from Q 12 hours to Q 8 hours for initiation of SLED overnight. Will continue to follow.    Pharmacist's Name: Rayna Starkey  Pharmacist's Extension: [phone]

## 2022-04-03 NOTE — PROGRESS NOTES
04/02/22 2345   Treatment   Treatment Type SLED   Treatment Status Daily equipment check   Dialysis Machine Number [id number]   Dialyzer Time (hours) 12.41   BVP (Liters) 147.1 L   Solutions Labeled and Current  Yes   Access Right;IJ;Temporary Cath   Catheter Dressing Intact  Yes   Alarms Engaged Yes   CRRT Comments daily check   Prescription   Time (Hours) Continuous   Dialysate K + (mEq/L) 4   Dialysate CA + (mEq/L) 2.25   Dialysate HCO3 - (Bicarb) (mEq/L) 35   Dialysate Na + (mEq/L) 138   Cartridge Type   (rev 300)   Dialysate Flow Rate (mL/min) 200   UF Goal Rate 400 mL/hr   CRRT Hourly Documentation   Blood Flow (mL/min) 200   UF Rate 350 cc/hr   Arterial Pressure (mmHg) -60 mmHg   Venous Pressure (mmHg) 90 mmHg   Effluent Pressure (EP) (mmHg) 10 mmHg   Orders/settings verified. Daily equipment check performed

## 2022-04-03 NOTE — PROGRESS NOTES
Pharmacokinetic Assessment Follow Up: IV Vancomycin     Vancomycin random resulted today at 6.1.   SLED continued.   Plan to administer vancomycin 1250mg x 1.   Follow up AM random levels.   Redose for random level <20     Thank you for the consult,   Rayna Starkey, PharmD, [facility]  Heart Transplant Clinical Specialist   Spectralink: [id number]    Drug levels (last 3 results):  Recent Labs   Lab Result Units 04/02/22  0211 04/03/22  0340   Vancomycin, Random ug/mL 26.2 6.1        Patient brief summary:  Kylie King is a 66 y.o. female initiated on antimicrobial therapy with IV Vancomycin for treatment of lower respiratory infection    Drug Allergies:   Review of patient's allergies indicates:   Allergen Reactions    Cyclobenzaprine Hallucinations    Erythromycin      Stomach upset    Keflex [cephalexin]      Yeast infection    Erythromycin base      Other reaction(s): upset stomach       Actual Body Weight:   103.9kg    Renal Function:   Estimated Creatinine Clearance: 71.2 mL/min (based on SCr of 0.9 mg/dL).,     Dialysis Method (if applicable):  SLED    CBC (last 72 hours):  Recent Labs   Lab Result Units 03/31/22  0922 04/01/22 0245 04/02/22 0211 04/03/22  0340   WBC K/uL 3.57* 3.93 8.03 5.80   Hemoglobin g/dL 11.5* 10.4* 11.1* 9.9*   Hematocrit % 38.0 35.8* 36.4* 32.8*   Platelets K/uL 137* 114* 213 120*   Gran % % 66.6 68.9 75.4* 75.5*   Lymph % % 24.1 22.9 15.7* 14.1*   Mono % % 7.6 6.4 7.7 8.8   Eosinophil % % 0.8 1.0 0.4 0.7   Basophil % % 0.6 0.5 0.6 0.7   Differential Method  Automated Automated Automated Automated       Metabolic Panel (last 72 hours):  Recent Labs   Lab Result Units 03/31/22  0922 03/31/22  1620 04/01/22  0245 04/01/22  2200 04/02/22  0211 04/02/22  0550 04/02/22  1453 04/02/22  2139 04/03/22  0340   Sodium mmol/L 134*  --  134* 134*  134* 133*  134* 134* 136 137 137   Potassium mmol/L 5.7* 5.0 4.9 6.5*  6.5* 5.1  5.1 4.6 4.4 4.5 4.3   Chloride mmol/L 94*  --  97 95  95 97  98  97 98 99 100   CO2 mmol/L 27  --  20* 25  25 24  25 26 29 29 28   Glucose mg/dL 130*  --  119* 98  98 127*  124* 104 96 89 79   BUN mg/dL 43*  --  33* 46*  46* 28*  27* 16 7* 4* <3*   Creatinine mg/dL 6.8*  --  5.5* 6.6*  6.6* 4.4*  4.3* 3.2* 1.8* 1.2 0.9   Albumin g/dL 3.5  --   --  3.4*  3.4* 3.5  3.4* 3.4* 3.2* 3.0* 3.0*   Total Bilirubin mg/dL 0.9  --   --   --  0.8  --   --   --  0.8   Alkaline Phosphatase U/L 83  --   --   --  93  --   --   --  77   AST U/L 55*  --   --   --  29  --   --   --  70*   ALT U/L 21  --   --   --  23  --   --   --  69*   Magnesium mg/dL  --   --   --   --  2.0  --   --   --  1.7   Phosphorus mg/dL  --   --   --  9.1*  9.1* 5.4*  5.3* 3.9 2.2* 2.1* 2.0*       Vancomycin Administrations:  vancomycin given in the last 96 hours                   vancomycin 2 g in dextrose 5 % 500 mL IVPB (mg) 2,000 mg New Bag 04/01/22 2229                Microbiologic Results:  Microbiology Results (last 7 days)     Procedure Component Value Units Date/Time    Blood culture [[id number]] Collected: 04/01/22 2219    Order Status: Completed Specimen: Blood from Peripheral, Upper Arm, Left Updated: 04/03/22 0613     Blood Culture, Routine No Growth to date      No Growth to date    Narrative:      Collection has been rescheduled by CLEMENTINA at 04/01/2022 20:22 Reason:   Patient unavailable  Collection has been rescheduled by CLEMENTINA at 04/01/2022 20:22 Reason:   Patient unavailable    Blood culture [[id number]] Collected: 04/01/22 2218    Order Status: Completed Specimen: Blood from Peripheral, Upper Arm, Left Updated: 04/03/22 0613     Blood Culture, Routine No Growth to date      No Growth to date    Narrative:      Collection has been rescheduled by CLEMENTINA at 04/01/2022 20:22 Reason:   Patient unavailable  Collection has been rescheduled by CLEMENTINA at 04/01/2022 20:22 Reason:   Patient unavailable

## 2022-04-03 NOTE — PROGRESS NOTES
04/03/22 0500   Treatment   Treatment Type SLED   Treatment Status Restart   Dialysis Machine Number [id number]   Dialyzer Time (hours) 0   BVP (Liters) 0 L   Solutions Labeled and Current  Yes   Access Temporary Cath;Right;IJ   Catheter Dressing Intact  Yes   Alarms Engaged Yes   CRRT Comments CRRT RST   $ CRRT Charges   $ CRRT Charges Restart   CRRT restarted per MD order and report given to primary RN.

## 2022-04-03 NOTE — PROGRESS NOTES
04/03/22 0400   Treatment   Treatment Type SLED   Treatment Status Clotting   Dialysis Machine Number [id number]   Dialyzer Time (hours) 17.07   BVP (Liters) 196.1 L   Solutions Labeled and Current  Yes   CRRT Comments CRRT clotted   CRRT clotted and start soon.

## 2022-04-03 NOTE — ASSESSMENT & PLAN NOTE
HbA1c:   Lab Results   Component Value Date    HGBA1C 5.9 (H) 11/03/2021    HGBA1C 7.8 10/09/2018     PLAN:   -- LDSSI + 2 U of detemir BID  -- Goal while inpatient 140-180  -- POC BG Q6H  -- RD for TF recs while intubated - will start trickle feeds 4/3 if still intubated

## 2022-04-03 NOTE — ASSESSMENT & PLAN NOTE
[id number] Scoring System Interpretation   (CHF, HTN, Age > 75 (2), DM II, Stroke, Vascular Disease Hx, Age > 65 (1), Sex Female)  [id number] Score  Adjusted Stroke Risk   0    0 %   1    1.3 %   2    2.2 %   3    3.2 %    4    4.0 %   5    6.7 %   6    9.8 %   7    9.6 %   8    6.7 %    9    15.2 %    HAS-BLED Scoring System Interpretation   (Hypertension, Abnormal Liver and Kidney Function, Stroke, Bleeding Tendency, Labile INR, Elderly, Drugs (EtOH and ASA/NSAIDs)    HAS-BLED Score  Bleeds per 100 patient years   0           1.13   1           1.02   2           1.88   3           3.74   4           8.70   5-9                 Insufficient Data      PLAN:  - Maintain K > 4, Mag > 2 and Ca/iCal WNL to decrease arrhythmogenic potential  - Cycle troponin to rule out ischemic etiology  - Rhythm control on Amiodarone  - A/C control with heparin gtt    --> [id number] score 4    - Maintain on telemetry and daily morning    --> Please perform EKG if the patient converts spontaneously

## 2022-04-03 NOTE — PROGRESS NOTES
[facility] - Cardiac Intensive Care  Nephrology  Progress Note    Patient Name: Kylie King  MRN: [id number]  Admission Date: 3/30/2022  Hospital Length of Stay: 4 days  Attending Provider: Gisela Lyles MD   Primary Care Physician: Virginia Foote MD  Principal Problem:NSTEMI (non-ST elevated myocardial infarction)    Subjective:     HPI: Kylie King is a 66 y.o. female who  has a past medical history of Breast cancer (s/p rad/chemo), History of Colon cancer, CAD s/p PCI on 02/08/2022 (on DAPT), Cataract, Choledocholithiasis, HFrEF (40%), COPD, Chronic venous insufficiency, Anxiety w Depression, ESRD (TThS), GERD, History of kidney stones, History of osteomyelitis of left foot, History of pancreatitis, History of stroke, Hyperlipidemia, Hypertension, Intracerebral hemorrhage, Lumbar degenerative disc disease, Lumbar spinal stenosis, Morbid obesity, Paroxysmal atrial fibrillation, Cerebral Vascular Hemorrhage, PAD (s/p left foot amputation on Plavix), Peripheral neuropathy, Pubic ramus fracture, Tobacco dependence, DM2, and Urinary incontinence, presented to the ED with after presenting to Cardiac clinica and noticed to be volume overloaded on exam and SOBOE. Patient only received 1.5 hours of dialysis this past run. On admit her K was 7.4 and and BNP >4500, therefore, urgent HD performed in ED. In addition her initial troponin was 3.5, with repeat nearly 5. Denies any CP throughout, but diabetic. ACS protocol was initiated. Cardiology was consulted. Of note, she is anuric and very sensitive to missed or shortened dialysis sessions and has had several recent hospitalizations from volume/ electrolytes issues. In regards to her ongoing diarrhea, unclear cause as she denies recent ABx use and recent C. Diff testing negative. Had recent C 2/8/22: V-stenting of the LAD and circumflex ostium was accomplished. Patient denies any fevers, night sweats, n/v/d/c, abd pain, dysuria, hematuria or hematochezia,  cough, wheezing, SOB, CP, leg swelling, HA, dizziness, weakness, hallucinations, SI, HI, or self injury at this time.      Nephrology was consulted for ESRD management       Interval History: Continues to be intubated. No acute events overnight. Was on UF of 400 that was decreased to 350 with CVP drop.    Review of patient's allergies indicates:   Allergen Reactions    Cyclobenzaprine Hallucinations    Erythromycin      Stomach upset    Keflex [cephalexin]      Yeast infection    Erythromycin base      Other reaction(s): upset stomach     Current Facility-Administered Medications   Medication Frequency    0.9%  NaCl infusion (CRRT USE ONLY) Continuous    0.9%  NaCl infusion Once    0.9%  NaCl infusion Once    0.9%  NaCl infusion Once    0.9%  NaCl infusion Continuous    acetaminophen tablet 650 mg Q8H PRN    amiodarone tablet 200 mg Daily    aspirin chewable tablet 81 mg Daily    atorvastatin tablet 80 mg Daily    chlorhexidine 0.12 % solution 15 mL BID    dextrose 10% bolus 125 mL PRN    famotidine (PF) injection 20 mg Daily    fentaNYL 2500 mcg in 0.9% sodium chloride 250 mL infusion premix (titrating) Continuous    gelatin adsorbable 12-7 mm top sponge sponge 1 applicator PRN    glucagon (human recombinant) injection 1 mg PRN    heparin (porcine) injection 1,000 Units PRN    heparin 25,000 units in dextrose 5% (100 units/ml) IV bolus from bag - ADDITIONAL PRN BOLUS - 30 units/kg (max bolus 4000 units) PRN    heparin 25,000 units in dextrose 5% (100 units/ml) IV bolus from bag - ADDITIONAL PRN BOLUS - 60 units/kg (max bolus 4000 units) PRN    heparin 25,000 units in dextrose 5% 250 mL (100 units/mL) infusion LOW INTENSITY nomogram - [facility] Continuous    insulin aspart U-100 pen 0-5 Units Q6H PRN    insulin detemir U-100 pen 2 Units BID    magnesium sulfate 2g in water 50mL IVPB (premix) PRN    melatonin tablet 6 mg Nightly    miconazole NITRATE 2 % top powder BID    midazolam (PF)  injection PRN    mupirocin 2 % ointment BID    naloxone 0.4 mg/mL injection 0.02 mg PRN    NORepinephrine bitartrate 8 mg in dextrose 5% 250 mL infusion Continuous    piperacillin-tazobactam 4.5 g in sodium chloride 0.9% 100 mL IVPB (ready to mix system) Q8H    polyethylene glycol packet 17 g BID PRN    propofol (DIPRIVAN) 10 mg/mL infusion Continuous    sodium chloride 0.9% bolus 250 mL PRN    sodium chloride 0.9% bolus 250 mL PRN    sodium chloride 0.9% bolus 250 mL PRN    sodium chloride 0.9% flush 10 mL PRN    ticagrelor tablet 90 mg BID    vancomycin - pharmacy to dose pharmacy to manage frequency       Objective:     Vital Signs (Most Recent):  Temp: 98.4 °F (36.9 °C) (04/02/22 1101)  Pulse: (!) 48 (04/02/22 1135)  Resp: 20 (04/02/22 1135)  BP: (!) 86/41 (04/02/22 0904)  SpO2: 99 % (04/02/22 1135)  O2 Device (Oxygen Therapy): ventilator (04/02/22 1135)   Vital Signs (24h Range):  Temp:  [95.9 °F (35.5 °C)-98.4 °F (36.9 °C)] 98.4 °F (36.9 °C)  Pulse:  [44-92] 48  Resp:  [10-20] 20  SpO2:  [87 %-100 %] 99 %  BP: ()/() 86/41  Arterial Line BP: (125-144)/(41-52) 133/41     Weight: 98.4 kg (216 lb 14.9 oz) (04/02/22 1100)  Body mass index is 37.24 kg/m².  Body surface area is 2.11 meters squared.    I/O last 3 completed shifts:  In: 3141.8 [I.V.:2524.9; IV Piggyback:617]  Out: 2265 [Other:2265]    Physical Exam  Vitals reviewed.   Constitutional:       General: She is not in acute distress.     Appearance: She is obese. She is ill-appearing.      Comments: Sedated and intubated    HENT:      Mouth/Throat:      Mouth: Mucous membranes are dry.   Eyes:      General: No scleral icterus.     Conjunctiva/sclera: Conjunctivae normal.      Pupils: Pupils are equal, round, and reactive to light.   Cardiovascular:      Rate and Rhythm: Normal rate and regular rhythm.      Heart sounds: Normal heart sounds.   Pulmonary:      Effort: Pulmonary effort is normal.      Breath sounds: Normal breath sounds.       Comments: Intubated   B/L air entry   Abdominal:      General: Bowel sounds are normal.      Palpations: Abdomen is soft.   Musculoskeletal:      Right lower leg: No edema.      Left lower leg: No edema.   Skin:     Coloration: Skin is not jaundiced.      Findings: No rash.   Neurological:      Comments: Sedated        Significant Labs:  All labs within the past 24 hours have been reviewed.     Significant Imaging:  Labs: Reviewed    Assessment/Plan:     * NSTEMI (non-ST elevated myocardial infarction)  Per primary     ESRD (end stage renal disease)    ESRD-HD  Recent Labs   Lab 04/01/22  2200 04/02/22  0211 04/02/22  0550   *  134* 133*  134* 134*   K 6.5*  6.5* 5.1  5.1 4.6   CL 95  95 97  98 97   CO2 25  25 24  25 26   BUN 46*  46* 28*  27* 16   CREATININE 6.6*  6.6* 4.4*  4.3* 3.2*   CALCIUM 8.3*  8.3* 8.1*  7.9* 8.2*   PHOS 9.1*  9.1* 5.4*  5.3* 3.9       INPUT/OUTPUT  I/O last 3 completed shifts:  In: 3141.8 [I.V.:2524.9; IV Piggyback:617]  Out: 2265 [Other:2265]  -----------------------------------  I/O this shift:  In: 355.1 [I.V.:355.1]  Out: 1449 [Other:1449]    Dialysis for metabolic clearance and volume management provided today.   Ultrafiltration goal: Liters: 1-2L  Duration: 3 hours  Labs have been reviewed.  Dialysate adjusted to current labs.  -Monitor for complications and adverse events.  -Continue to monitor intake and output.  -Daily weights.  -Pre and Post-HD weights to determine EDW.   -Avoid nephrotoxic medication and renal dose medications to GFR.  -Low NA, K, PO4 diet.  -Will continue to monitoring.     Mineral Bone Disease in CKD   Lab Results   Component Value Date    .0 (H) 07/14/2021    CALCIUM 8.2 (L) 04/02/2022    PHOS 3.9 04/02/2022       -PO4, Mg and Calcium levels.   -Renal diet with protein intake goal 1.5 g/kg/d.  -Novasource with meals.  -Daily renal panel to monitor phosphate and albumin.  -Continue sevelamer     Anemia of Chronic Kidney Disease    Recent Labs   Lab 03/31/22  0922 04/01/22  0245 04/01/22  1839 04/02/22  0211   WBC 3.57* 3.93  --  8.03   HGB 11.5* 10.4*  --  11.1*   HCT 38.0 35.8* 35* 36.4*   * 114*  --  213     Lab Results   Component Value Date    IRON 42 11/25/2021    TIBC 268 11/25/2021    FERRITIN 919 (H) 08/05/2021        --Maintain Hb>7.0 g/dL. -Goal in ESRD is Hgb of 10-11.  -Iron studies in AM. -10  -Maintain Hb>7g/dL  -Goal in ESRD is Hgb of 10-11.     Arterial Hypertension    -Normotensive 140   -No lab stick or BP intake on access site.    Hyperlipidemia    -Continue current meds.             End-stage renal disease on hemodialysis  Patient is ESRD on HD TTS has right upper ext forearm fistula.     She went to cath lab yesterday for MI and stent thrombosis for recent stent that she had 3 weeks ago. Before cath was started patient went into resp distress and intubated. Patient is hypervolemic and hypertensive.   Labs this morning did now show any emergent electrolyte or acid/base abnormalities.     Will continue with SCUF with UF of 400.               Thank you for your consult. I will follow-up with patient. Please contact us if you have any additional questions.    Pawan Mills MD  Nephrology  [facility] - Card[facility]

## 2022-04-03 NOTE — PLAN OF CARE
CICU DAILY GOALS       A: Awake    RASS: Goal -    Actual - RASS (Sanchez Agitation-Sedation Scale): -3-->moderate sedation   Restraint necessity: Clinical Justification: Removing medical devices  B: Breath   SBT:  PM shift, not attempted    C: Coordinate A & B, analgesics/sedatives   Pain: managed    SAT:  PM shift, not attempted  D: Delirium   CAM-ICU: Overall CAM-ICU: Positive  E: Early(intubated/ Progressive (non-intubated) Mobility   MOVE Screen: Fail   Activity: Activity Management: Patient unable to perform activities  FAS: Feeding/Nutrition   Diet order: Diet/Nutrition Received: NPO,   Fluid restriction:    T: Thrombus   DVT prophylaxis: VTE Required Core Measure: Pharmacological prophylaxis initiated/maintained  H: HOB Elevation   Head of Bed (HOB) Positioning: HOB at 20-30 degrees  U: Ulcer Prophylaxis   GI: yes  G: Glucose control   managed Glycemic Management: blood glucose monitored  S: Skin   Bundle compliance: yes   Bathing/Skin Care: back care, bath, complete, dressed/undressed, foot care, incontinence care, linen changed Date: 4/3/22  B: Bowel Function   constipation   I: Indwelling Catheters   Mace necessity:     CVC necessity: Yes   IPAD offered: Not appropriate  D: De-escalation Antibx   Yes  Plan for the day   Manage CRRT and monitor fluid removal, titrate levo to maintain MAP >65, monitor electrolytes, cardiac monitoring  Family/Goals of care/Code Status   Code Status: Full Code       Gtts: see eMAR  levo @ .05  fentanyl @ 212.5  Heparin @ 18  Propofol @ 35    CVPs for PM shift: 7, 5, and 8  CRRT clotted around 0400 and was restarted at 0500. UF at 350, pt tolerating well.   Phos 2.0 and Mg 1.7, MD aware and replacements ordered. Last aPTT 31.8, bolused and titrated heparin per nomogram.     No acute events throughout night, VS and assessment per flow sheet, patient progressing towards goals as tolerated, plan of care reviewed with Kylie King and family, all concerns addressed, will  continue to monitor.

## 2022-04-03 NOTE — PLAN OF CARE
CICU DAILY GOALS       A: Awake    RASS: Goal - (-2)  Actual - RASS (Sanchez Agitation-Sedation Scale): -3-->moderate sedation   Restraint necessity: Clinical Justification: Removing medical devices, Treatment Interference  B: Breath   SBT: Not attempted   C: Coordinate A & B, analgesics/sedatives   Pain: managed    SAT: Not attempted  D: Delirium   CAM-ICU: Overall CAM-ICU: Positive  E: Early(intubated/ Progressive (non-intubated) Mobility   MOVE Screen: Fail   Activity: Activity Management: Rolling - L1  FAS: Feeding/Nutrition   Diet order: Diet/Nutrition Received: NPO,   Fluid restriction:    T: Thrombus   DVT prophylaxis: VTE Required Core Measure: Pharmacological prophylaxis initiated/maintained  H: HOB Elevation   Head of Bed (HOB) Positioning: HOB at 30 degrees  U: Ulcer Prophylaxis   GI: yes  G: Glucose control   managed Glycemic Management: blood glucose monitored  S: Skin   Bundle compliance: yes   Bathing/Skin Care: bath, partial Date: 04/02/2022    B: Bowel Function   constipation   I: Indwelling Catheters   Mace necessity:     CVC necessity: No   IPAD offered: No  D: De-escalation Antibx   Yes  Plan for the day   CVP- 8. Vent settings 50% FiO2, 5 of PEEP, rate 25. Tube feeds initiated at 10ml/hr. Attempted to wean propofol and levo. Patient did not tolerate. MD notified.   Family/Goals of care/Code Status   Code Status: Full Code     No acute events throughout day, VS and assessment per flow sheet, patient progressing towards goals as tolerated, plan of care reviewed with Kylie King and family, all concerns addressed, will continue to monitor.

## 2022-04-03 NOTE — PLAN OF CARE
CICU DAILY GOALS       A: Awake    RASS: Goal - (-2)  Actual - RASS (Sanchez Agitation-Sedation Scale): -3-->moderate sedation   Restraint necessity: Clinical Justification: Removing medical devices, Treatment Interference  B: Breath   SBT: {SBT pass/fail:92114}   C: Coordinate A & B, analgesics/sedatives   Pain: {Managed/ Uncontrolled:39335}    SAT: {SBT pass/fail:78283}  D: Delirium   CAM-ICU: Overall CAM-ICU: Positive  E: Early(intubated/ Progressive (non-intubated) Mobility   MOVE Screen: {Pass/Fail:71291}   Activity: Activity Management: Rolling - L1  FAS: Feeding/Nutrition   Diet order: Diet/Nutrition Received: NPO,   Fluid restriction:    T: Thrombus   DVT prophylaxis: VTE Required Core Measure: Pharmacological prophylaxis initiated/maintained  H: HOB Elevation   Head of Bed (HOB) Positioning: HOB at 30 degrees  U: Ulcer Prophylaxis   GI: {YES/NO:20890}  G: Glucose control   {Managed/ Uncontrolled:64016} Glycemic Management: blood glucose monitored  S: Skin   Bundle compliance: {YES/NO:20890}   Bathing/Skin Care: bath, partial Date: ***  B: Bowel Function   {bowel:34948}   I: Indwelling Catheters   Mace necessity:     CVC necessity: {YES:08171}   IPAD offered: {Ipad:09649}  D: De-escalation Antibx   {YES:39328}  Plan for the day   ***  Family/Goals of care/Code Status   Code Status: Full Code     No acute events throughout day, VS and assessment per flow sheet, patient progressing towards goals as tolerated, plan of care reviewed with Kylie King and family, all concerns addressed, will continue to monitor.

## 2022-04-03 NOTE — ASSESSMENT & PLAN NOTE
Patient is ESRD on HD TTS has right upper ext forearm fistula. upgraded to ICU today   Last HD was done 3/31/22 with with 2L out   Patient has been neg 4 Liters since admission     She went to cath lab 4/1 for MI and stent thrombosis for recent stent that she had 3 weeks ago. Before cath was started patient went into resp distress and intubated. Patient is hypervolemic and hypertensive.       PLAN:  -- on CRRT

## 2022-04-03 NOTE — ASSESSMENT & PLAN NOTE
ICM NYHA Class III Acute on Chronic Systolic and diastolic HF Stage C: Structural heart disease with prior or current symptoms of HF.  - Most recent TTE demonstrates: LVef 28 %, Grade II DD - Mild AS Aortic valve area is 1.62 cm2; peak velocity is 1.46 m/s; mean gradient is 4 mmHg. CVP 15   - EKG : NSR with QTc 510  - Troponin 24.436  BNP 4,554     Hemodynamics 4/3/22:  CO = 5.71  CI = 2.71  SVR = 1109   on Levo 0.05   CVP 8  SVO2 52    PLAN:  - Maintain on telemetry    - Up to date risk stratification : TSH, Lipids, HbA1c with optimization of risk factors is necessary  - Check Electrolytes, keep Mag >2 & K+ >4  - SCDs, TEDs, Nursing communication to elevated LE   - Ambulate as tolerated    - Optimal therapy at this time to include :    - Anti-platelet agent with ASA + ticagalor    - ACE/ARB hold at this time on pressors    - BB  hold at this time on pressors and up titrate as hemodynamics allow    - Statin with liptor 80 mg QD      - Follow up transitional care visit for heart failure at discharge

## 2022-04-03 NOTE — ASSESSMENT & PLAN NOTE
Per oncology - last seen By Dr. Mclain in 2015  carcinoma of the left breast T2N0 Stage IIA Strongly ER and NC positive, HER-2 negative.  Oncotype .Score returned at 6 indicating a low risk and a 5% relapse rate with hormonal therapy alone.She initiated Letrozole hormonal therapy in April 2013.  .    She had developed a bloody nipple discharge in the latter part of 2012 and a mammogram on December 31 showed asymmetric densities in the left breast at the 5:00 position extending posteriorly.  Ultrasound showed several solid masses extending into the lower outer quadrant.  On January 8 ultrasound-guided biopsy showed an infiltrating ductal carcinoma intermediate grade (3+2+1), 100%  ER-positive, 100%  NC positive, HER-2 1+.  On January 25 right and left mastectomy were performed which showed a 4.4 cm intraductal carcinoma grade 2 with positive deep margin involving skeletal muscle the pectoralis.  [place] lymph node showed multifocal clusters of tumor cells all less than 1 her soles, final pathological stage T2 N0 (IHC positive) stage II A. The right breast was unremarkable pathologically.

## 2022-04-03 NOTE — PROGRESS NOTES
[facility] - Cardiac Intensive [facility]  Cardiology  Progress Note    Patient Name: Kylie King  MRN: [id number]  Admission Date: 3/30/2022  Hospital Length of Stay: 4 days  Code Status: Full Code   Attending Physician: Gisela Lyles MD   Primary Care Physician: Virginia Foote MD  Expected Discharge Date: 4/6/2022  Principal Problem:NSTEMI (non-ST elevated myocardial infarction)    Subjective:     Hospital Course:   She was subsequently admitted to ED for urgent HD however noted to have NSTEMI and admitted to hospital medicine. Trop notable for rise 4--> 25 with EKG showng IVCD/LBBB and TTE with worsening EF 40%--> 28% and RWMA in LAD and Lcx territory. Per my exam, patient asymptomatic however she was also asymptomatic with last NSTEMI presentation. She was loaded with ASA and plavix and on hep gtt however labwork notable for persistent hyperkalemia K 5.7 Hg 12 Plt 168. She underwent HD on 4/1 but still had persistent hypervolemia. Went for an angiogram on 4/1 due to NSTEMI. Troponin increased to 25. Before cath was started patient went into resp distress and intubated. Placed on CRRT 4/1. Hemodynamics are not consistent with cardiogenic shock. Levo is stable at low doses. She was started on empiric therapy on 4/1.      Interval History: All cell lines down in CBC likely secondary to suppression in the setting of shock. Significant volume removal with CRRT. Repeat CXR to assess pulmonary edema. Currently weaning the vent and pressor requirements. Moving all extremities spontaneously off of sedation but failure to follow purposeful commands     Review of Systems   Unable to perform ROS: Intubated   Objective:     Vital Signs (Most Recent):  Temp: 98.7 °F (37.1 °C) (04/03/22 0701)  Pulse: 61 (04/03/22 0821)  Resp: 18 (04/03/22 0821)  BP: (!) 108/51 (04/03/22 0801)  SpO2: 100 % (04/03/22 0821)   Vital Signs (24h Range):  Temp:  [97.2 °F (36.2 °C)-100 °F (37.8 °C)] 98.7 °F (37.1 °C)  Pulse:  [41-89] 61  Resp:   [10-25] 18  SpO2:  [85 %-100 %] 100 %  BP: ()/(37-51) 108/51  Arterial Line BP: (114-170)/(32-57) 158/57     Weight: 103.9 kg (229 lb 0.9 oz)  Body mass index is 39.32 kg/m².     SpO2: 100 %  O2 Device (Oxygen Therapy): ventilator      Intake/Output Summary (Last 24 hours) at 4/3/2022 0850  Last data filed at 4/3/2022 0801  Gross per 24 hour   Intake 6953.27 ml   Output 7732 ml   Net -778.73 ml         Lines/Drains/Airways       Central Venous Catheter Line  Duration             Trialysis (Dialysis) Catheter 04/01/22 2036 right internal jugular 1 day              Drain  Duration                  Hemodialysis AV Fistula 02/17/22 Right forearm 45 days         NG/OG Tube 04/01/22 1451 Center mouth 1 day              Airway  Duration                  Airway - Non-Surgical 04/01/22 1444 Endotracheal Tube 1 day       Airway Anesthesia 04/01/22 1 day              Arterial Line  Duration             Arterial Line 04/01/22 1555 Left Radial 1 day              Peripheral Intravenous Line  Duration                  Peripheral IV - Single Lumen 03/31/22 2232 20 G Anterior;Left Forearm 2 days         Peripheral IV - Single Lumen 03/31/22 2232 20 G Anterior;Left;Proximal Upper Arm 2 days         Peripheral IV - Single Lumen 04/01/22 1500 20 G Left Antecubital 1 day                    Physical Exam  Vitals and nursing note reviewed.   Constitutional:       Appearance: Normal appearance. She is obese. She is not ill-appearing.   HENT:      Head: Normocephalic and atraumatic.   Cardiovascular:      Rate and Rhythm: Normal rate and regular rhythm.      Pulses: Normal pulses.      Heart sounds: Normal heart sounds. No murmur heard.    No friction rub. No gallop.   Pulmonary:      Effort: Respiratory distress present.      Breath sounds: Rales present.      Comments: intubated  Abdominal:      General: Abdomen is flat. Bowel sounds are normal. There is no distension.      Palpations: Abdomen is soft.      Tenderness: There is no  abdominal tenderness.   Musculoskeletal:      Right lower leg: Edema present.      Left lower leg: Edema present.   Skin:     General: Skin is warm.   Neurological:      Comments: Sedated       Significant Labs: CMP   Recent Labs   Lab 04/02/22  0211 04/02/22  0550 04/02/22  1453 04/02/22  2139 04/03/22  0340   *  134*   < > 136 137 137   K 5.1  5.1   < > 4.4 4.5 4.3   CL 97  98   < > 98 99 100   CO2 24  25   < > 29 29 28   *  124*   < > 96 89 79   BUN 28*  27*   < > 7* 4* <3*   CREATININE 4.4*  4.3*   < > 1.8* 1.2 0.9   CALCIUM 8.1*  7.9*   < > 7.9* 8.0* 7.9*   PROT 7.3  --   --   --  6.0   ALBUMIN 3.5  3.4*   < > 3.2* 3.0* 3.0*   BILITOT 0.8  --   --   --  0.8   ALKPHOS 93  --   --   --  77   AST 29  --   --   --  70*   ALT 23  --   --   --  69*   ANIONGAP 12  11   < > 9 9 9   ESTGFRAFRICA 11.2*  11.5*   < > 33.1* 54.0* >60.0   EGFRNONAA 9.7*  10.0*   < > 28.7* 46.9* >60.0    < > = values in this interval not displayed.     , CBC   Recent Labs   Lab 04/02/22 0211 04/03/22  0340   WBC 8.03 5.80   HGB 11.1* 9.9*   HCT 36.4* 32.8*    120*     and All pertinent lab results from the last 24 hours have been reviewed.    Significant Imaging:  All imaging reviewed in the last 24 hours    Assessment and Plan:     * NSTEMI (non-ST elevated myocardial infarction)  Tests     LDL:   Lab Results   Component Value Date    LDLCALC 33.6 (L) 02/09/2021        BNP:   Lab Results   Component Value Date    BNP 4,554 (H) 03/30/2022        TSH:   Lab Results   Component Value Date    TSH 2.540 10/30/2021        HbA1c:   Lab Results   Component Value Date    HGBA1C 5.9 (H) 11/03/2021    HGBA1C 7.8 10/09/2018       MELISSA score: 5  Luana score: 117    Previous [facility] 2/7/22:  · The Ost Cx to Prox Cx lesion was 80% stenosed.  · The 1st Mrg lesion was 60% stenosed.  · The 1st Diag lesion was 60% stenosed.  · The estimated blood loss was <50 mL.   She has LCX and Diag disease but the LCX is the most important  "lesion.  Because this is an ostial lesion, will do LAD/LCX "V" stenting.          PLAN:  - attempted LHC with IC on 4/1 but batient became acutely hypertenive, tachycardiac and hypoxic requiring the LHC to be aborted  - Maintain on telemetry and repeat EKG in the AM to look for any subtle changes  - Keep NPO for further investigation   - Up to date risk stratification labs to be ordered and addressed appropriately     --> TSH, Lipids, HbA1c    - ACS Core Measures     Heparin / Enoxaparin: heparin gtt     Aspirin + Brillinta     Beta-Blocker: on hold while on pressors     ACE/ARB: On hold      Statin: Atorvastatin         Hypertensive emergency  PLAN:  -- Resolved now on pressors    ESRD (end stage renal disease)  Undergoing CRRT since 4/1 CVP is 17 on 4/2     COPD without exacerbation  Patient is intubated    Type 2 diabetes mellitus with diabetic peripheral angiopathy and gangrene, with long-term current use of insulin     HbA1c:   Lab Results   Component Value Date    HGBA1C 5.9 (H) 11/03/2021    HGBA1C 7.8 10/09/2018     PLAN:   -- LDSSI + 2 U of detemir BID  -- Goal while inpatient 140-180  -- POC BG Q6H  -- RD for TF recs while intubated - will start trickle feeds 4/3 if still intubated      Atrial fibrillation  [id number] Scoring System Interpretation   (CHF, HTN, Age > 75 (2), DM II, Stroke, Vascular Disease Hx, Age > 65 (1), Sex Female)  [id number] Score  Adjusted Stroke Risk   0    0 %   1    1.3 %   2    2.2 %   3    3.2 %    4    4.0 %   5    6.7 %   6    9.8 %   7    9.6 %   8    6.7 %    9    15.2 %    HAS-BLED Scoring System Interpretation   (Hypertension, Abnormal Liver and Kidney Function, Stroke, Bleeding Tendency, Labile INR, Elderly, Drugs (EtOH and ASA/NSAIDs)    HAS-BLED Score  Bleeds per 100 patient years   0           1.13   1           1.02   2           1.88   3           3.74   4           8.70   5-9                 Insufficient Data      PLAN:  - Maintain K > 4, Mag > 2 and Ca/iCal WNL " to decrease arrhythmogenic potential  - Cycle troponin to rule out ischemic etiology  - Rhythm control on Amiodarone  - A/C control with heparin gtt    --> [id number] score 4    - Maintain on telemetry and daily morning    --> Please perform EKG if the patient converts spontaneously     Anemia in ESRD (end-stage renal disease)  Management per nephro    Acute on chronic respiratory failure  Hypervolemia controlled with HD  Patient is currently intubated    End-stage renal disease on hemodialysis  Patient is ESRD on HD TTS has right upper ext forearm fistula. upgraded to ICU today   Last HD was done 3/31/22 with with 2L out   Patient has been neg 4 Liters since admission     She went to cath lab 4/1 for MI and stent thrombosis for recent stent that she had 3 weeks ago. Before cath was started patient went into resp distress and intubated. Patient is hypervolemic and hypertensive.       PLAN:  -- on CRRT    Acute on chronic combined systolic and diastolic heart failure  ICM NYHA Class III Acute on Chronic Systolic and diastolic HF Stage C: Structural heart disease with prior or current symptoms of HF.  - Most recent TTE demonstrates: LVef 28 %, Grade II DD - Mild AS Aortic valve area is 1.62 cm2; peak velocity is 1.46 m/s; mean gradient is 4 mmHg. CVP 15   - EKG : NSR with QTc 510  - Troponin 24.436  BNP 4,554     Hemodynamics 4/3/22:  CO = 5.71  CI = 2.71  SVR = 1109   on Levo 0.05   CVP 8  SVO2 52    PLAN:  - Maintain on telemetry    - Up to date risk stratification : TSH, Lipids, HbA1c with optimization of risk factors is necessary  - Check Electrolytes, keep Mag >2 & K+ >4  - SCDs, TEDs, Nursing communication to elevated LE   - Ambulate as tolerated    - Optimal therapy at this time to include :    - Anti-platelet agent with ASA + ticagalor    - ACE/ARB hold at this time on pressors    - BB  hold at this time on pressors and up titrate as hemodynamics allow    - Statin with liptor 80 mg QD      - Follow up  transitional care visit for heart failure at discharge    History of breast cancer  Per oncology - last seen By Dr. Mclain in 2015  carcinoma of the left breast T2N0 Stage IIA Strongly ER and MI positive, HER-2 negative.  Oncotype .Score returned at 6 indicating a low risk and a 5% relapse rate with hormonal therapy alone.She initiated Letrozole hormonal therapy in April 2013.  .    She had developed a bloody nipple discharge in the latter part of 2012 and a mammogram on December 31 showed asymmetric densities in the left breast at the 5:00 position extending posteriorly.  Ultrasound showed several solid masses extending into the lower outer quadrant.  On January 8 ultrasound-guided biopsy showed an infiltrating ductal carcinoma intermediate grade (3+2+1), 100%  ER-positive, 100%  MI positive, HER-2 1+.  On January 25 right and left mastectomy were performed which showed a 4.4 cm intraductal carcinoma grade 2 with positive deep margin involving skeletal muscle the pectoralis.  [place] lymph node showed multifocal clusters of tumor cells all less than 1 her soles, final pathological stage T2 N0 (IHC positive) stage II A. The right breast was unremarkable pathologically.    Hyperlipidemia     LDL:   Lab Results   Component Value Date    LDLCALC 33.6 (L) 02/09/2021       PLAN:  -- Continue High intensity statin         VTE Risk Mitigation (From admission, onward)         Ordered     heparin (porcine) injection 1,000 Units  As needed (PRN)         04/01/22 1523     Reason for No Pharmacological VTE Prophylaxis  Once        Question:  Reasons:  Answer:  Already adequately anticoagulated on oral Anticoagulants    04/01/22 1525     IP VTE HIGH RISK PATIENT  Once         04/01/22 1525     heparin 25,000 units in dextrose 5% (100 units/ml) IV bolus from bag - ADDITIONAL PRN BOLUS - 60 units/kg (max bolus 4000 units)  As needed (PRN)        Question:  Heparin Infusion Adjustment (DO NOT MODIFY ANSWER)  Answer:   [email or web address]\[email or web address]\Images\Pharmacy\HeparinInfusions\heparin LOW INTENSITY nomogram for [facility] [id number]    03/30/22 1925     heparin 25,000 units in dextrose 5% (100 units/ml) IV bolus from bag - ADDITIONAL PRN BOLUS - 30 units/kg (max bolus 4000 units)  As needed (PRN)        Question:  Heparin Infusion Adjustment (DO NOT MODIFY ANSWER)  Answer:  \\[email or web address]\epic\Images\Pharmacy\HeparinInfusions\heparin LOW INTENSITY nomogram for [facility] [id number].pdf    03/30/22 1925     heparin 25,000 units in dextrose 5% 250 mL (100 units/mL) infusion LOW INTENSITY nomogram - [facility]  Continuous        Question Answer Comment   Heparin Infusion Adjustment (DO NOT MODIFY ANSWER) \\[email or web address]\epic\Images\Pharmacy\HeparinInfusions\heparin LOW INTENSITY nomogram for [facility] [id number].pdf    Begin at (in units/kg/hr) 12        03/30/22 1925     Place sequential compression device  Until discontinued         03/30/22 1922                Lily Bourgeois DO  Cardiology  [facility] - Card[facility] Int[facility]

## 2022-04-03 NOTE — SUBJECTIVE & OBJECTIVE
Interval History: All cell lines down in CBC likely secondary to suppression in the setting of shock. Significant volume removal with CRRT. Repeat CXR to assess pulmonary edema. Currently weaning the vent and pressor requirements. Moving all extremities spontaneously off of sedation but failure to follow purposeful commands     Review of Systems   Unable to perform ROS: Intubated   Objective:     Vital Signs (Most Recent):  Temp: 98.7 °F (37.1 °C) (04/03/22 0701)  Pulse: 61 (04/03/22 0821)  Resp: 18 (04/03/22 0821)  BP: (!) 108/51 (04/03/22 0801)  SpO2: 100 % (04/03/22 0821)   Vital Signs (24h Range):  Temp:  [97.2 °F (36.2 °C)-100 °F (37.8 °C)] 98.7 °F (37.1 °C)  Pulse:  [41-89] 61  Resp:  [10-25] 18  SpO2:  [85 %-100 %] 100 %  BP: ()/(37-51) 108/51  Arterial Line BP: (114-170)/(32-57) 158/57     Weight: 103.9 kg (229 lb 0.9 oz)  Body mass index is 39.32 kg/m².     SpO2: 100 %  O2 Device (Oxygen Therapy): ventilator      Intake/Output Summary (Last 24 hours) at 4/3/2022 0850  Last data filed at 4/3/2022 0801  Gross per 24 hour   Intake 6953.27 ml   Output 7732 ml   Net -778.73 ml         Lines/Drains/Airways       Central Venous Catheter Line  Duration             Trialysis (Dialysis) Catheter 04/01/22 2036 right internal jugular 1 day              Drain  Duration                  Hemodialysis AV Fistula 02/17/22 Right forearm 45 days         NG/OG Tube 04/01/22 1451 Center mouth 1 day              Airway  Duration                  Airway - Non-Surgical 04/01/22 1444 Endotracheal Tube 1 day       Airway Anesthesia 04/01/22 1 day              Arterial Line  Duration             Arterial Line 04/01/22 1555 Left Radial 1 day              Peripheral Intravenous Line  Duration                  Peripheral IV - Single Lumen 03/31/22 2232 20 G Anterior;Left Forearm 2 days         Peripheral IV - Single Lumen 03/31/22 2232 20 G Anterior;Left;Proximal Upper Arm 2 days         Peripheral IV - Single Lumen 04/01/22 1500  20 G Left Antecubital 1 day                    Physical Exam  Vitals and nursing note reviewed.   Constitutional:       Appearance: Normal appearance. She is obese. She is not ill-appearing.   HENT:      Head: Normocephalic and atraumatic.   Cardiovascular:      Rate and Rhythm: Normal rate and regular rhythm.      Pulses: Normal pulses.      Heart sounds: Normal heart sounds. No murmur heard.    No friction rub. No gallop.   Pulmonary:      Effort: Respiratory distress present.      Breath sounds: Rales present.      Comments: intubated  Abdominal:      General: Abdomen is flat. Bowel sounds are normal. There is no distension.      Palpations: Abdomen is soft.      Tenderness: There is no abdominal tenderness.   Musculoskeletal:      Right lower leg: Edema present.      Left lower leg: Edema present.   Skin:     General: Skin is warm.   Neurological:      Comments: Sedated       Significant Labs: CMP   Recent Labs   Lab 04/02/22  0211 04/02/22  0550 04/02/22  1453 04/02/22  2139 04/03/22  0340   *  134*   < > 136 137 137   K 5.1  5.1   < > 4.4 4.5 4.3   CL 97  98   < > 98 99 100   CO2 24  25   < > 29 29 28   *  124*   < > 96 89 79   BUN 28*  27*   < > 7* 4* <3*   CREATININE 4.4*  4.3*   < > 1.8* 1.2 0.9   CALCIUM 8.1*  7.9*   < > 7.9* 8.0* 7.9*   PROT 7.3  --   --   --  6.0   ALBUMIN 3.5  3.4*   < > 3.2* 3.0* 3.0*   BILITOT 0.8  --   --   --  0.8   ALKPHOS 93  --   --   --  77   AST 29  --   --   --  70*   ALT 23  --   --   --  69*   ANIONGAP 12  11   < > 9 9 9   ESTGFRAFRICA 11.2*  11.5*   < > 33.1* 54.0* >60.0   EGFRNONAA 9.7*  10.0*   < > 28.7* 46.9* >60.0    < > = values in this interval not displayed.     , CBC   Recent Labs   Lab 04/02/22  0211 04/03/22  0340   WBC 8.03 5.80   HGB 11.1* 9.9*   HCT 36.4* 32.8*    120*     and All pertinent lab results from the last 24 hours have been reviewed.    Significant Imaging:  All imaging reviewed in the last 24 hours

## 2022-04-04 NOTE — INTERVAL H&P NOTE
The patient has been examined and the H&P has been reviewed:    I concur with the findings and no changes have occurred since H&P was written.    Anesthesia/Surgery risks, benefits and alternative options discussed and understood by patient/family.    L CFA      Active Hospital Problems    Diagnosis  POA    *NSTEMI (non-ST elevated myocardial infarction) [I21.4]  Yes    Hypertensive emergency [I16.1]  Yes    ESRD (end stage renal disease) [N18.6]  Yes    Prolonged Q-T interval on ECG [R94.31]  Yes    COPD without exacerbation [J44.9]  Yes     Chronic    Type 2 diabetes mellitus with diabetic peripheral angiopathy and gangrene, with long-term current use of insulin [E11.52, Z79.4]  Not Applicable     Chronic    Atrial fibrillation [I48.91]  Yes     Chronic    Anemia in ESRD (end-stage renal disease) [N18.6, D63.1]  Yes     Chronic    Chronic respiratory failure with hypoxia, on home oxygen therapy [J96.11, Z99.81]  Not Applicable     Chronic    Acute on chronic respiratory failure [J96.20]  Unknown    History of colon cancer [Z85.038]  Yes     Chronic    End-stage renal disease on hemodialysis [N18.6, Z99.2]  Not Applicable     Chronic    Acute on chronic combined systolic and diastolic heart failure [I50.43]  Yes    History of breast cancer [Z85.3]  Not Applicable     Chronic    Hyperlipidemia [E78.5]  Yes     Chronic      Resolved Hospital Problems    Diagnosis Date Resolved POA    History of non-ST elevation myocardial infarction (NSTEMI) [I25.2] 04/01/2022 Not Applicable    Hyperkalemia [E87.5] 04/01/2022 Yes

## 2022-04-04 NOTE — PROGRESS NOTES
Pharmacokinetic Assessment Follow Up: IV Vancomycin    Therapy with vancomycin and consult were discontinued by provider. Patient had an order for vancomycin 1500mg IV x 1 this morning but did not yet receive the dose. The dose was discontinued. Pharmacy will sign off, please re-consult as needed.    Edelmira Yoon, PharmD, BCPS, BCCP Cardiology Clinical Pharmacy Specialist  EXT [phone]

## 2022-04-04 NOTE — PLAN OF CARE
CICU DAILY GOALS       A: Awake    RASS: Goal -    Actual - RASS (Sanchez Agitation-Sedation Scale): -3-->moderate sedation   Restraint necessity: Clinical Justification: Treatment Interference, Removing medical devices  B: Breath   SBT: Not attempted   C: Coordinate A & B, analgesics/sedatives   Pain: managed    SAT: Not attempted  D: Delirium   CAM-ICU: Overall CAM-ICU: Positive  E: Early(intubated/ Progressive (non-intubated) Mobility   MOVE Screen: Fail   Activity: Activity Management: Patient unable to perform activities  FAS: Feeding/Nutrition   Diet order: Diet/Nutrition Received: tube feeding,   Fluid restriction:    T: Thrombus   DVT prophylaxis: VTE Required Core Measure: Pharmacological prophylaxis initiated/maintained  H: HOB Elevation   Head of Bed (HOB) Positioning: HOB at 30-45 degrees  U: Ulcer Prophylaxis   GI: yes  G: Glucose control   managed Glycemic Management: blood glucose monitored  S: Skin   Bundle compliance: yes   Bathing/Skin Care: bath, partial Date: AM bath  B: Bowel Function   constipation   I: Indwelling Catheters   Mace necessity:     CVC necessity: Yes   IPAD offered: Not appropriate  D: De-escalation Antibx   No  Plan for the day   CVP 8 this shift, SCUFF with UF at 400, no issues overnight, weaning pressors and sedation as tolerated  Family/Goals of care/Code Status   Code Status: Full Code     No acute events throughout day, VS and assessment per flow sheet, patient progressing towards goals as tolerated, plan of care reviewed with Kylie King and family, all concerns addressed, will continue to monitor.

## 2022-04-04 NOTE — PROGRESS NOTES
Pharmacokinetic Assessment Follow Up: IV Vancomycin    Vancomycin serum concentration assessment(s):    · The random level was drawn correctly and can be used to guide therapy at this time. The measurement is below the desired definitive target range of 15 to 20 mcg/mL.  · Per nephrology continue SCUF but switch to SLED this afternoon.     Vancomycin Regimen Plan:    · Given subtherapeutic random level and expected clearance on SLED redose vancomycin 15mg/kg (1500mg) IV x 1 dose   · Vancomycin random level with AM labs and redose per level and RRT plans.    Drug levels (last 3 results):  Recent Labs   Lab Result Units 04/02/22  0211 04/03/22  0340 04/04/22  0311   Vancomycin, Random ug/mL 26.2 6.1 13.1       Pharmacy will continue to follow and monitor vancomycin.    Please contact pharmacy at extension [phone] for questions regarding this assessment.    Thank you for the consult,   Edelmira Yoon, PharmD, BCPS, BCCP Cardiology Clinical Pharmacy Specialist  EXT [phone]       Patient brief summary:  Kylie Knig is a 66 y.o. female initiated on antimicrobial therapy with IV Vancomycin for treatment of lower respiratory infection    The patient's current regimen is pulse dosing    Drug Allergies:   Review of patient's allergies indicates:   Allergen Reactions    Cyclobenzaprine Hallucinations    Erythromycin      Stomach upset    Keflex [cephalexin]      Yeast infection    Erythromycin base      Other reaction(s): upset stomach       Actual Body Weight:   95.2kg    Renal Function:   Estimated Creatinine Clearance: 38.2 mL/min (A) (based on SCr of 1.6 mg/dL (H)).,     Dialysis Method (if applicable):  SCUF/SLED    CBC (last 72 hours):  Recent Labs   Lab Result Units 04/02/22  0211 04/03/22  0340 04/04/22  0311   WBC K/uL 8.03 5.80 5.41   Hemoglobin g/dL 11.1* 9.9* 10.2*   Hematocrit % 36.4* 32.8* 33.9*   Platelets K/uL 213 120* 123*   Gran % % 75.4* 75.5* 77.7*   Lymph % % 15.7* 14.1* 11.5*   Mono %  % 7.7 8.8 8.7   Eosinophil % % 0.4 0.7 1.1   Basophil % % 0.6 0.7 0.6   Differential Method  Automated Automated Automated       Metabolic Panel (last 72 hours):  Recent Labs   Lab Result Units 04/01/22  2200 04/02/22  0211 04/02/22  0550 04/02/22  1453 04/02/22  2139 04/03/22  0340 04/03/22  1034 04/03/22  1551 04/03/22  2120 04/04/22  0311   Sodium mmol/L 134*  134* 133*  134* 134* 136 137 137  --  139 138 133*   Potassium mmol/L 6.5*  6.5* 5.1  5.1 4.6 4.4 4.5 4.3  --  4.4 4.1 4.0   Chloride mmol/L 95  95 97  98 97 98 99 100  --  101 101 98   CO2 mmol/L 25  25 24  25 26 29 29 28  --  28 22* 22*   Glucose mg/dL 98  98 127*  124* 104 96 89 79  --  77 89 128*   BUN mg/dL 46*  46* 28*  27* 16 7* 4* <3*  --  <3* 4* 7*   Creatinine mg/dL 6.6*  6.6* 4.4*  4.3* 3.2* 1.8* 1.2 0.9  --  0.8 1.2 1.6*   Albumin g/dL 3.4*  3.4* 3.5  3.4* 3.4* 3.2* 3.0* 3.0*  --  3.1* 2.9* 3.1*   Total Bilirubin mg/dL  --  0.8  --   --   --  0.8  --   --   --  1.0   Alkaline Phosphatase U/L  --  93  --   --   --  77  --   --   --  80   AST U/L  --  29  --   --   --  70*  --   --   --  51*   ALT U/L  --  23  --   --   --  69*  --   --   --  70*   Magnesium mg/dL  --  2.0  --   --   --  1.7 1.7 1.8 1.7 2.2   Phosphorus mg/dL 9.1*  9.1* 5.4*  5.3* 3.9 2.2* 2.1* 2.0* 1.9* 2.1*  2.1* 2.9 5.8*       Vancomycin Administrations:  vancomycin given in the last 96 hours                   vancomycin 1.25 g in dextrose 5% 250 mL IVPB (ready to mix) (mg) 1,250 mg New Bag 04/03/22 0833    vancomycin 2 g in dextrose 5 % 500 mL IVPB (mg) 2,000 mg New Bag 04/01/22 2229                Microbiologic Results:  Microbiology Results (last 7 days)     Procedure Component Value Units Date/Time    Blood culture [[id number]] Collected: 04/01/22 2219    Order Status: Completed Specimen: Blood from Peripheral, Upper Arm, Left Updated: 04/04/22 0612     Blood Culture, Routine No Growth to date      No Growth to date      No Growth to date    Narrative:       Collection has been rescheduled by CLEMENTINA at 04/01/2022 20:22 Reason:   Patient unavailable  Collection has been rescheduled by CLEMENTINA at 04/01/2022 20:22 Reason:   Patient unavailable    Blood culture [[id number]] Collected: 04/01/22 2218    Order Status: Completed Specimen: Blood from Peripheral, Upper Arm, Left Updated: 04/04/22 0612     Blood Culture, Routine No Growth to date      No Growth to date      No Growth to date    Narrative:      Collection has been rescheduled by CLEMENTINA at 04/01/2022 20:22 Reason:   Patient unavailable  Collection has been rescheduled by CLEMENTINA at 04/01/2022 20:22 Reason:   Patient unavailable

## 2022-04-04 NOTE — PLAN OF CARE
Cardiac ICU Care Plan    POC reviewed with Kylie King and family. Questions and concerns addressed. Pt went to cath lab today, no acute events. Will continue to monitor. See below and flowsheets for full assessment and VS info.       Neuro:  Deana Coma Scale  Best Eye Response: 3-->(E3) to speech  Best Motor Response: 4-->(M4) withdraws from pain  Best Verbal Response: 1-->(V1) none  Deana Coma Scale Score: 8  Assessment Qualifiers: patient chemically sedated or paralyzed  Pupil PERRLA: other (see comments)    24 hr Temp:  [96.6 °F (35.9 °C)-98.7 °F (37.1 °C)]      CV:  Rhythm: sinus bradycardia   DVT prophylaxis: VTE Required Core Measure: Pharmacological prophylaxis initiated/maintained    CVP (mean): 9 mmHg (04/04/22 0701)       SVO2 (%): 52 % (04/03/22 0701)               Pulses  Right Radial Pulse: 2+ (normal)  Left Radial Pulse: 2+ (normal)  Right Dorsalis Pedis Pulse: Doppler  Left Dorsalis Pedis Pulse: unable to assess  Right Posterior Tibial Pulse: Doppler  Left Posterior Tibial Pulse: Doppler    Resp:  O2 Device (Oxygen Therapy): ventilator  Flow (L/min): 2  Vent Mode: A/C  Set Rate: 25 BPM  Oxygen Concentration (%): 70  Vt Set: 350 mL  PEEP/CPAP: 5 cmH20    GI/:  GI prophylaxis: yes  Diet/Nutrition Received: tube feeding  Last Bowel Movement: 04/03/22  Voiding Characteristics: anuria   Intake/Output Summary (Last 24 hours) at 4/4/2022 1748  Last data filed at 4/4/2022 1736  Gross per 24 hour   Intake 7066.87 ml   Output 6430 ml   Net 636.87 ml     Treatment Type: Slow continuous ultrafiltration  UF Rate: 400 cc/hr    Labs/Accuchecks:  Recent Labs   Lab 04/02/22  0211 04/03/22  0340 04/04/22  0311   WBC 8.03 5.80 5.41   RBC 4.03 3.67* 3.69*   HGB 11.1* 9.9* 10.2*   HCT 36.4* 32.8* 33.9*    120* 123*      Recent Labs   Lab 03/30/22  1952 03/31/22 0922 04/03/22  1740 04/04/22  0033 04/04/22  0622   INR 1.1  --   --   --   --    APTT 27.6   < > 37.3* 47.9* 41.6*    < > = values in this  interval not displayed.      Recent Labs     04/04/22  0311 04/04/22  1446   * 133*   K 4.0 4.1   CO2 22* 22*   CL 98 99   BUN 7* 10   CREATININE 1.6* 2.1*   ALKPHOS 80  --    ALT 70*  --    AST 51*  --    BILITOT 1.0  --        Recent Labs   Lab 03/30/22  1952 03/31/22  0922 04/03/22  1034   TROPONINI 4.811* 24.436* 12.036*      Recent Labs     04/03/22  0817 04/03/22  1551 04/04/22  1212   PH 7.376 7.384 7.266*   PCO2 50.6* 48.4* 51.2*   PO2 152* 84 65*   HCO3 29.7* 28.9* 23.3*   POCSATURATED 99 96 89*   BE 5 4 -4       Electrolytes: No replacement orders  Accuchecks: Q6H    Gtts/LDAs:   sodium chloride 0.9% 200 mL/hr at 04/04/22 1736    sodium chloride 0.9% Stopped (04/03/22 0357)    fentanyl 125 mcg/hr (04/04/22 1700)    NORepinephrine bitartrate-D5W 0.02 mcg/kg/min (04/04/22 1700)    propofoL 20 mcg/kg/min (04/04/22 1700)       Lines/Drains/Airways       Central Venous Catheter Line  Duration             Trialysis (Dialysis) Catheter 04/01/22 2036 right internal jugular 2 days              Drain  Duration                  Hemodialysis AV Fistula 02/17/22 Right forearm 46 days         NG/OG Tube 04/01/22 1451 Center mouth 3 days              Airway  Duration                  Airway - Non-Surgical 04/01/22 1444 Endotracheal Tube 3 days       Airway Anesthesia 04/01/22 3 days              Arterial Line  Duration             Arterial Line 04/01/22 1555 Left Radial 3 days              Peripheral Intravenous Line  Duration                  Peripheral IV - Single Lumen 03/31/22 2232 20 G Anterior;Left Forearm 3 days         Peripheral IV - Single Lumen 03/31/22 2232 20 G Anterior;Left;Proximal Upper Arm 3 days         Peripheral IV - Single Lumen 04/01/22 1500 20 G Left Antecubital 3 days                    Skin/Wounds  Bathing/Skin Care: bath, complete (04/04/22 1501)  Wounds: No  Wound care consulted: No

## 2022-04-04 NOTE — SUBJECTIVE & OBJECTIVE
Interval History: intubated on 50 %Fio2 on vent. Seen on scuff this morning patient neg 1.1 Liters in last 24 hours     Review of patient's allergies indicates:   Allergen Reactions    Cyclobenzaprine Hallucinations    Erythromycin      Stomach upset    Keflex [cephalexin]      Yeast infection    Erythromycin base      Other reaction(s): upset stomach     Current Facility-Administered Medications   Medication Frequency    0.9%  NaCl infusion (CRRT USE ONLY) Continuous    0.9%  NaCl infusion Once    0.9%  NaCl infusion Once    0.9%  NaCl infusion Once    0.9%  NaCl infusion Continuous    acetaminophen tablet 650 mg Q8H PRN    amiodarone tablet 200 mg Daily    aspirin chewable tablet 81 mg Daily    atorvastatin tablet 80 mg Daily    chlorhexidine 0.12 % solution 15 mL BID    dextrose 10% bolus 125 mL PRN    famotidine (PF) injection 20 mg Daily    fentaNYL 2500 mcg in 0.9% sodium chloride 250 mL infusion premix (titrating) Continuous    fentaNYL 50 mcg/mL injection 25 mcg Q15 Min PRN    gelatin adsorbable 12-7 mm top sponge sponge 1 applicator PRN    glucagon (human recombinant) injection 1 mg PRN    heparin (porcine) injection 1,000 Units PRN    heparin 25,000 units in dextrose 5% (100 units/ml) IV bolus from bag - ADDITIONAL PRN BOLUS - 30 units/kg (max bolus 4000 units) PRN    heparin 25,000 units in dextrose 5% (100 units/ml) IV bolus from bag - ADDITIONAL PRN BOLUS - 60 units/kg (max bolus 4000 units) PRN    heparin 25,000 units in dextrose 5% 250 mL (100 units/mL) infusion LOW INTENSITY nomogram - [facility] Continuous    insulin aspart U-100 pen 0-5 Units Q6H PRN    insulin detemir U-100 pen 2 Units BID    magnesium sulfate 2g in water 50mL IVPB (premix) PRN    melatonin tablet 6 mg Nightly    miconazole NITRATE 2 % top powder BID    midazolam (PF) injection PRN    mupirocin 2 % ointment BID    naloxone 0.4 mg/mL injection 0.02 mg PRN    NORepinephrine bitartrate 8 mg in dextrose 5% 250 mL infusion Continuous     piperacillin-tazobactam 4.5 g in sodium chloride 0.9% 100 mL IVPB (ready to mix system) Q8H    polyethylene glycol packet 17 g BID PRN    propofol (DIPRIVAN) 10 mg/mL infusion Continuous    sodium chloride 0.9% bolus 250 mL PRN    sodium chloride 0.9% bolus 250 mL PRN    sodium chloride 0.9% bolus 250 mL PRN    sodium chloride 0.9% flush 10 mL PRN    sodium phosphate 20.01 mmol in dextrose 5 % 250 mL IVPB PRN    sodium phosphate 30 mmol in dextrose 5 % 250 mL IVPB PRN    sodium phosphate 39.99 mmol in dextrose 5 % 250 mL IVPB PRN    ticagrelor tablet 90 mg BID    vancomycin - pharmacy to dose pharmacy to manage frequency       Objective:     Vital Signs (Most Recent):  Temp: 96.6 °F (35.9 °C) (04/04/22 0701)  Pulse: 70 (04/04/22 0715)  Resp: (!) 25 (04/04/22 0715)  BP: (!) 110/51 (04/03/22 1902)  SpO2: 100 % (04/04/22 0715)  O2 Device (Oxygen Therapy): ventilator (04/04/22 0715)   Vital Signs (24h Range):  Temp:  [96.6 °F (35.9 °C)-99.2 °F (37.3 °C)] 96.6 °F (35.9 °C)  Pulse:  [48-70] 70  Resp:  [17-26] 25  SpO2:  [100 %] 100 %  BP: (110-123)/(47-86) 110/51  Arterial Line BP: (111-155)/(37-52) 136/47     Weight: 95.2 kg (209 lb 14.1 oz) (04/04/22 0400)  Body mass index is 36.03 kg/m².  Body surface area is 2.07 meters squared.    I/O last 3 completed shifts:  In: 13910.2 [I.V.:9270.9; NG/GT:615; IV Piggyback:1098.3]  Out: 14437 [Other:51309]    Physical Exam  Vitals reviewed.   Constitutional:       Appearance: She is obese. She is ill-appearing.      Comments: Sedated and intubated    Cardiovascular:      Rate and Rhythm: Normal rate and regular rhythm.      Heart sounds: Normal heart sounds.   Pulmonary:      Effort: Pulmonary effort is normal.      Breath sounds: Normal breath sounds.      Comments: Intubated   B/L air entry   Abdominal:      General: Bowel sounds are normal.      Palpations: Abdomen is soft.   Musculoskeletal:      Right lower leg: No edema.      Left lower leg: No edema.   Skin:     Coloration:  Skin is not jaundiced.      Findings: No rash.   Neurological:      Comments: Sedated        Significant Labs:  CBC:   Recent Labs   Lab 04/04/22 0311   WBC 5.41   RBC 3.69*   HGB 10.2*   HCT 33.9*   *   MCV 92   MCH 27.6   MCHC 30.1*       CMP:   Recent Labs   Lab 04/04/22 0311   *   CALCIUM 8.8   ALBUMIN 3.1*   PROT 6.3   *   K 4.0   CO2 22*   CL 98   BUN 7*   CREATININE 1.6*   ALKPHOS 80   ALT 70*   AST 51*   BILITOT 1.0          Significant Imaging:  Reviewed

## 2022-04-04 NOTE — PROGRESS NOTES
04/04/22 0426   Treatment   Treatment Type SCUF   Treatment Status Equipment change   Dialysis Machine Number [id number]   Dialyzer Time (hours) 23.29   BVP (Liters) 113.4 L   Solutions Labeled and Current  Yes   Access Temporary Cath;Right;IJ   Catheter Dressing Intact  Yes   Alarms Engaged Yes   CRRT Comments CRRT swap   CRRT stopped for machine swap. Will restart asap.

## 2022-04-04 NOTE — NURSING
Pt returned from cath lab. VSS, access site without bleeding and without hematoma. Dialysis notified

## 2022-04-04 NOTE — BRIEF OP NOTE
Brief Operative Report:    : Abelardo Betancur MD     All Operators: Surgeon(s):  MD Laverne Bernal MD Stephen R. Ramee, MD     Preoperative Diagnosis: NSTEMI (non-ST elevated myocardial infarction) [I21.4]     Postop Diagnosis: NSTEMI (non-ST elevated myocardial infarction) [I21.4]    Treatments/Procedures: Procedure(s) (LRB):  Left heart cath (Left)    Findings:Severe coronary disease is present. See catheterization report for full details. Pt with patent LM bifurcation and severe lesions in the OM and Diagonal these are small vessels. LVEDP is 30 would recommend medical therapy and aggressive dialysis.     Estimated Blood loss: 20 cc    Specimens removed: No    Hetal Nye

## 2022-04-04 NOTE — PROGRESS NOTES
04/04/22 0200   Treatment   Treatment Type SCUF   Treatment Status Daily equipment check   Dialysis Machine Number [id number]   Dialyzer Time (hours) 21.04   BVP (Liters) 113.4 L   Solutions Labeled and Current  Yes   Access Temporary Cath;Right;IJ   Catheter Dressing Intact  Yes   Alarms Engaged Yes   CRRT Comments daily check   Daily check completed. Orders and machine settings verified.

## 2022-04-04 NOTE — ASSESSMENT & PLAN NOTE
Patient is ESRD on HD TTS has right upper ext forearm fistula.     She went to cath lab Friday 4/1 for MI and stent thrombosis for recent stent that she had 3 weeks ago. Before cath was started patient went into resp distress and intubated. Patient was hypervolemic and hypertensive.     Plan:   Patient on SCUFF neg 1.1 liters in last 24 hours. Will continue with SCUFF and switch to SLED later afternoon and to keep net neg around 2 liters in next 24 hours   On vent 50% Fio2   Patient on levo

## 2022-04-04 NOTE — PROGRESS NOTES
[facility] - Cardiac Intensive Care  Nephrology  Progress Note    Patient Name: Kylie King  MRN: [id number]  Admission Date: 3/30/2022  Hospital Length of Stay: 5 days  Attending Provider: Giesla Lyles MD   Primary Care Physician: Virginia Foote MD  Principal Problem:NSTEMI (non-ST elevated myocardial infarction)    Subjective:     HPI: Kylie King is a 66 y.o. female who  has a past medical history of Breast cancer (s/p rad/chemo), History of Colon cancer, CAD s/p PCI on 02/08/2022 (on DAPT), Cataract, Choledocholithiasis, HFrEF (40%), COPD, Chronic venous insufficiency, Anxiety w Depression, ESRD (TThS), GERD, History of kidney stones, History of osteomyelitis of left foot, History of pancreatitis, History of stroke, Hyperlipidemia, Hypertension, Intracerebral hemorrhage, Lumbar degenerative disc disease, Lumbar spinal stenosis, Morbid obesity, Paroxysmal atrial fibrillation, Cerebral Vascular Hemorrhage, PAD (s/p left foot amputation on Plavix), Peripheral neuropathy, Pubic ramus fracture, Tobacco dependence, DM2, and Urinary incontinence, presented to the ED with after presenting to Cardiac clinica and noticed to be volume overloaded on exam and SOBOE. Patient only received 1.5 hours of dialysis this past run. On admit her K was 7.4 and and BNP >4500, therefore, urgent HD performed in ED. In addition her initial troponin was 3.5, with repeat nearly 5. Denies any CP throughout, but diabetic. ACS protocol was initiated. Cardiology was consulted. Of note, she is anuric and very sensitive to missed or shortened dialysis sessions and has had several recent hospitalizations from volume/ electrolytes issues. In regards to her ongoing diarrhea, unclear cause as she denies recent ABx use and recent C. Diff testing negative. Had recent C 2/8/22: V-stenting of the LAD and circumflex ostium was accomplished. Patient denies any fevers, night sweats, n/v/d/c, abd pain, dysuria, hematuria or hematochezia,  cough, wheezing, SOB, CP, leg swelling, HA, dizziness, weakness, hallucinations, SI, HI, or self injury at this time.      Nephrology was consulted for ESRD management       Interval History: intubated on 50 %Fio2 on vent. Seen on scuff this morning patient neg 1.1 Liters in last 24 hours     Review of patient's allergies indicates:   Allergen Reactions    Cyclobenzaprine Hallucinations    Erythromycin      Stomach upset    Keflex [cephalexin]      Yeast infection    Erythromycin base      Other reaction(s): upset stomach     Current Facility-Administered Medications   Medication Frequency    0.9%  NaCl infusion (CRRT USE ONLY) Continuous    0.9%  NaCl infusion Once    0.9%  NaCl infusion Once    0.9%  NaCl infusion Once    0.9%  NaCl infusion Continuous    acetaminophen tablet 650 mg Q8H PRN    amiodarone tablet 200 mg Daily    aspirin chewable tablet 81 mg Daily    atorvastatin tablet 80 mg Daily    chlorhexidine 0.12 % solution 15 mL BID    dextrose 10% bolus 125 mL PRN    famotidine (PF) injection 20 mg Daily    fentaNYL 2500 mcg in 0.9% sodium chloride 250 mL infusion premix (titrating) Continuous    fentaNYL 50 mcg/mL injection 25 mcg Q15 Min PRN    gelatin adsorbable 12-7 mm top sponge sponge 1 applicator PRN    glucagon (human recombinant) injection 1 mg PRN    heparin (porcine) injection 1,000 Units PRN    heparin 25,000 units in dextrose 5% (100 units/ml) IV bolus from bag - ADDITIONAL PRN BOLUS - 30 units/kg (max bolus 4000 units) PRN    heparin 25,000 units in dextrose 5% (100 units/ml) IV bolus from bag - ADDITIONAL PRN BOLUS - 60 units/kg (max bolus 4000 units) PRN    heparin 25,000 units in dextrose 5% 250 mL (100 units/mL) infusion LOW INTENSITY nomogram - [facility] Continuous    insulin aspart U-100 pen 0-5 Units Q6H PRN    insulin detemir U-100 pen 2 Units BID    magnesium sulfate 2g in water 50mL IVPB (premix) PRN    melatonin tablet 6 mg Nightly    miconazole NITRATE 2 %  top powder BID    midazolam (PF) injection PRN    mupirocin 2 % ointment BID    naloxone 0.4 mg/mL injection 0.02 mg PRN    NORepinephrine bitartrate 8 mg in dextrose 5% 250 mL infusion Continuous    piperacillin-tazobactam 4.5 g in sodium chloride 0.9% 100 mL IVPB (ready to mix system) Q8H    polyethylene glycol packet 17 g BID PRN    propofol (DIPRIVAN) 10 mg/mL infusion Continuous    sodium chloride 0.9% bolus 250 mL PRN    sodium chloride 0.9% bolus 250 mL PRN    sodium chloride 0.9% bolus 250 mL PRN    sodium chloride 0.9% flush 10 mL PRN    sodium phosphate 20.01 mmol in dextrose 5 % 250 mL IVPB PRN    sodium phosphate 30 mmol in dextrose 5 % 250 mL IVPB PRN    sodium phosphate 39.99 mmol in dextrose 5 % 250 mL IVPB PRN    ticagrelor tablet 90 mg BID    vancomycin - pharmacy to dose pharmacy to manage frequency       Objective:     Vital Signs (Most Recent):  Temp: 96.6 °F (35.9 °C) (04/04/22 0701)  Pulse: 70 (04/04/22 0715)  Resp: (!) 25 (04/04/22 0715)  BP: (!) 110/51 (04/03/22 1902)  SpO2: 100 % (04/04/22 0715)  O2 Device (Oxygen Therapy): ventilator (04/04/22 0715)   Vital Signs (24h Range):  Temp:  [96.6 °F (35.9 °C)-99.2 °F (37.3 °C)] 96.6 °F (35.9 °C)  Pulse:  [48-70] 70  Resp:  [17-26] 25  SpO2:  [100 %] 100 %  BP: (110-123)/(47-86) 110/51  Arterial Line BP: (111-155)/(37-52) 136/47     Weight: 95.2 kg (209 lb 14.1 oz) (04/04/22 0400)  Body mass index is 36.03 kg/m².  Body surface area is 2.07 meters squared.    I/O last 3 completed shifts:  In: 95033.2 [I.V.:9270.9; NG/GT:615; IV Piggyback:1098.3]  Out: 71329 [Other:19714]    Physical Exam  Vitals reviewed.   Constitutional:       Appearance: She is obese. She is ill-appearing.      Comments: Sedated and intubated    Cardiovascular:      Rate and Rhythm: Normal rate and regular rhythm.      Heart sounds: Normal heart sounds.   Pulmonary:      Effort: Pulmonary effort is normal.      Breath sounds: Normal breath sounds.      Comments:  Intubated   B/L air entry   Abdominal:      General: Bowel sounds are normal.      Palpations: Abdomen is soft.   Musculoskeletal:      Right lower leg: No edema.      Left lower leg: No edema.   Skin:     Coloration: Skin is not jaundiced.      Findings: No rash.   Neurological:      Comments: Sedated        Significant Labs:  CBC:   Recent Labs   Lab 04/04/22 0311   WBC 5.41   RBC 3.69*   HGB 10.2*   HCT 33.9*   *   MCV 92   MCH 27.6   MCHC 30.1*       CMP:   Recent Labs   Lab 04/04/22 0311   *   CALCIUM 8.8   ALBUMIN 3.1*   PROT 6.3   *   K 4.0   CO2 22*   CL 98   BUN 7*   CREATININE 1.6*   ALKPHOS 80   ALT 70*   AST 51*   BILITOT 1.0          Significant Imaging:  Reviewed     Assessment/Plan:     * NSTEMI (non-ST elevated myocardial infarction)  Per primary     Acute on chronic respiratory failure  Per primary     End-stage renal disease on hemodialysis  Patient is ESRD on HD TTS has right upper ext forearm fistula.     She went to cath lab Friday 4/1 for MI and stent thrombosis for recent stent that she had 3 weeks ago. Before cath was started patient went into resp distress and intubated. Patient was hypervolemic and hypertensive.     Plan:   Patient on SCUFF neg 1.1 liters in last 24 hours. Will continue with SCUFF and switch to SLED this evening  and to keep net neg around 2 liters in next 24 hours. Primary team requested to remove more volume   On vent 50% Fio2   Patient on levo               Ken Bond MD  Nephrology  [facility] - Cardiac Intensive Care

## 2022-04-04 NOTE — PROGRESS NOTES
04/04/22 0445   Treatment   Treatment Type SCUF   Treatment Status Restart;Equipment change   Dialysis Machine Number [id number]   Dialyzer Time (hours) 0   BVP (Liters) 0 L   Solutions Labeled and Current  Yes   Access Temporary Cath;Right;IJ   Catheter Dressing Intact  Yes   Alarms Engaged Yes   CRRT Comments CRRT RST   $ CRRT Charges   $ CRRT Charges Restart   CRRT restarted per MD order. Report given to primary RN.

## 2022-04-04 NOTE — PLAN OF CARE
[facility] - Cardiac Intensive Care  Discharge Reassessment    Primary Care Provider: Virginia Foote MD    Expected Discharge Date: 4/6/2022    Reassessment (most recent)     Discharge Reassessment - 04/04/22 1459        Discharge Reassessment    Assessment Type Discharge Planning Reassessment     Did the patient's condition or plan change since previous assessment? Yes     Communicated UMAIR with patient/caregiver Date not available/Unable to determine     Discharge Plan A Rehab     Discharge Plan B Long-term acute care facility (LTAC)     DME Needed Upon Discharge  none     Discharge Barriers Identified None     Why the patient remains in the hospital Requires continued medical care               Julie Haase RN  Case Management [phone]

## 2022-04-05 NOTE — PROGRESS NOTES
[facility] - [facility]  Nephrology  Progress Note    Patient Name: Kylie King  MRN: [id number]  Admission Date: 3/30/2022  Hospital Length of Stay: 6 days  Attending Provider: Gisela Lyles MD   Primary Care Physician: Virginia Foote MD  Principal Problem:NSTEMI (non-ST elevated myocardial infarction)    Subjective:     HPI: Kylie King is a 66 y.o. female who  has a past medical history of Breast cancer (s/p rad/chemo), History of Colon cancer, CAD s/p PCI on 02/08/2022 (on DAPT), Cataract, Choledocholithiasis, HFrEF (40%), COPD, Chronic venous insufficiency, Anxiety w Depression, ESRD (TThS), GERD, History of kidney stones, History of osteomyelitis of left foot, History of pancreatitis, History of stroke, Hyperlipidemia, Hypertension, Intracerebral hemorrhage, Lumbar degenerative disc disease, Lumbar spinal stenosis, Morbid obesity, Paroxysmal atrial fibrillation, Cerebral Vascular Hemorrhage, PAD (s/p left foot amputation on Plavix), Peripheral neuropathy, Pubic ramus fracture, Tobacco dependence, DM2, and Urinary incontinence, presented to the ED with after presenting to Cardiac clinica and noticed to be volume overloaded on exam and SOBOE. Patient only received 1.5 hours of dialysis this past run. On admit her K was 7.4 and and BNP >4500, therefore, urgent HD performed in ED. In addition her initial troponin was 3.5, with repeat nearly 5. Denies any CP throughout, but diabetic. ACS protocol was initiated. Cardiology was consulted. Of note, she is anuric and very sensitive to missed or shortened dialysis sessions and has had several recent hospitalizations from volume/ electrolytes issues. In regards to her ongoing diarrhea, unclear cause as she denies recent ABx use and recent C. Diff testing negative. Had recent C 2/8/22: V-stenting of the LAD and circumflex ostium was accomplished. Patient denies any fevers, night sweats, n/v/d/c, abd pain, dysuria, hematuria or hematochezia,  cough, wheezing, SOB, CP, leg swelling, HA, dizziness, weakness, hallucinations, SI, HI, or self injury at this time.      Nephrology was consulted for ESRD management       Interval History: intubated on 32% %Fio2 on vent. Seen on SLEDD this morning patient neg 137 cc yesterday till this morning patient clotted twice on SLED hep gtt was started. She is neg 615 cc since this morning     Review of patient's allergies indicates:   Allergen Reactions    Cyclobenzaprine Hallucinations    Erythromycin      Stomach upset    Keflex [cephalexin]      Yeast infection    Erythromycin base      Other reaction(s): upset stomach     Current Facility-Administered Medications   Medication Frequency    0.9%  NaCl infusion (CRRT USE ONLY) Continuous    0.9%  NaCl infusion Continuous    acetaminophen tablet 650 mg Q8H PRN    amiodarone in dextrose 150 mg/100 mL (1.5 mg/mL) loading dose 150 mg Once    amiodarone tablet 200 mg Daily    aspirin chewable tablet 81 mg Daily    atorvastatin tablet 80 mg Daily    chlorhexidine 0.12 % solution 15 mL BID    dextrose 10% bolus 125 mL PRN    famotidine (PF) injection 20 mg Daily    fentaNYL 2500 mcg in 0.9% sodium chloride 250 mL infusion premix (titrating) Continuous    fentaNYL 50 mcg/mL injection 25 mcg Q15 Min PRN    gelatin adsorbable 12-7 mm top sponge sponge 1 applicator PRN    glucagon (human recombinant) injection 1 mg PRN    heparin (porcine) injection 1,000 Units PRN    heparin 25,000 units in dextrose 5% (100 units/ml) IV bolus from bag - ADDITIONAL PRN BOLUS - 30 units/kg PRN    heparin 25,000 units in dextrose 5% (100 units/ml) IV bolus from bag - ADDITIONAL PRN BOLUS - 60 units/kg PRN    heparin 25,000 units in dextrose 5% 250 mL (100 units/mL) infusion LOW INTENSITY nomogram - [facility] Continuous    insulin aspart U-100 pen 0-5 Units Q6H PRN    insulin detemir U-100 pen 2 Units BID    magnesium sulfate 2g in water 50mL IVPB (premix) PRN    melatonin tablet 6  mg Nightly    metoprolol tartrate (LOPRESSOR) tablet 25 mg BID    miconazole NITRATE 2 % top powder BID    naloxone 0.4 mg/mL injection 0.02 mg PRN    ondansetron disintegrating tablet 8 mg Q8H PRN    phenylephrine (EDILBERTO-SYNEPHRINE) 100 mg in sodium chloride 0.9% 250 mL infusion Continuous    piperacillin-tazobactam 4.5 g in sodium chloride 0.9% 100 mL IVPB (ready to mix system) Q8H    polyethylene glycol packet 17 g BID PRN    propofol (DIPRIVAN) 10 mg/mL infusion Continuous    sodium chloride 0.9% bolus 250 mL PRN    sodium chloride 0.9% bolus 250 mL PRN    sodium chloride 0.9% bolus 250 mL PRN    sodium chloride 0.9% flush 10 mL PRN    ticagrelor tablet 90 mg BID    vasopressin (PITRESSIN) 100 Units in dextrose 5 % 100 mL infusion Continuous    vasopressin (PITRESSIN) 20 unit/mL injection        Objective:     Vital Signs (Most Recent):  Temp: 96.7 °F (35.9 °C) (04/05/22 0300)  Pulse: (!) 125 (04/05/22 0732)  Resp: (!) 25 (04/05/22 0732)  BP: (!) 111/44 (04/05/22 0718)  SpO2: 100 % (04/05/22 0732)  O2 Device (Oxygen Therapy): ventilator (04/05/22 0732)   Vital Signs (24h Range):  Temp:  [96.7 °F (35.9 °C)-97.6 °F (36.4 °C)] 96.7 °F (35.9 °C)  Pulse:  [] 125  Resp:  [4-26] 25  SpO2:  [93 %-100 %] 100 %  BP: ()/(44-55) 111/44  Arterial Line BP: ()/(38-51) 108/43     Weight: 95.2 kg (209 lb 14.1 oz) (04/04/22 0400)  Body mass index is 36.03 kg/m².  Body surface area is 2.07 meters squared.    I/O last 3 completed shifts:  In: 9972.2 [I.V.:8676.9; NG/GT:505; IV Piggyback:790.3]  Out: 20284 [Drains:225; Other:86373]    Physical Exam  Vitals reviewed.   Constitutional:       Appearance: She is obese. She is ill-appearing.      Comments: Sedated and intubated    Cardiovascular:      Rate and Rhythm: Normal rate and regular rhythm.      Heart sounds: Normal heart sounds.   Pulmonary:      Effort: Pulmonary effort is normal.      Breath sounds: Normal breath sounds.      Comments: Intubated    B/L air entry   Abdominal:      General: Bowel sounds are normal.      Palpations: Abdomen is soft.   Musculoskeletal:      Right lower leg: No edema.      Left lower leg: No edema.   Skin:     Coloration: Skin is not jaundiced.      Findings: No rash.   Neurological:      Comments: Sedated        Significant Labs:  CBC:   Recent Labs   Lab 04/05/22 0422   WBC 6.31   RBC 4.03   HGB 10.8*   HCT 36.2*      MCV 90   MCH 26.8*   MCHC 29.8*       CMP:   Recent Labs   Lab 04/04/22  0311 04/04/22  1446 04/05/22  0422   *   < > 94   CALCIUM 8.8   < > 8.9   ALBUMIN 3.1*   < > 3.1*   PROT 6.3  --   --    *   < > 138   K 4.0   < > 4.6   CO2 22*   < > 19*   CL 98   < > 103   BUN 7*   < > 7*   CREATININE 1.6*   < > 1.6*   ALKPHOS 80  --   --    ALT 70*  --   --    AST 51*  --   --    BILITOT 1.0  --   --     < > = values in this interval not displayed.          Significant Imaging:  Reviewed     Assessment/Plan:     * NSTEMI (non-ST elevated myocardial infarction)  Per primary     Acute on chronic respiratory failure  Per primary     End-stage renal disease on hemodialysis  Patient is ESRD on HD TTS has right upper ext forearm fistula.     She went to cath lab Friday 4/1 for MI and stent thrombosis for recent stent that she had 3 weeks ago. Before cath was started patient went into resp distress and intubated. Patient was hypervolemic and hypertensive.     Plan:   intubated on 32% %Fio2 on vent. Seen on SLEDD this morning patient neg 137 cc yesterday till this morning patient clotted twice on SLED hep gtt was started. She is neg 615 cc since this morning. Will change to SCUFF for 8 hours then change back to SLED. Chest xray today shows improving pulm edema                 Ken Bond MD  Nephrology  [facility] - [facility]

## 2022-04-05 NOTE — PLAN OF CARE
CICU DAILY GOALS       A: Awake    RASS: Goal - RASS Goal: 0-->alert and calm  Actual - RASS (Sanchez Agitation-Sedation Scale): -3-->moderate sedation   Restraint necessity: Clinical Justification: Removing medical devices  B: Breath   SBT: Not attempted   C: Coordinate A & B, analgesics/sedatives   Pain: managed    SAT: Not attempted  D: Delirium   CAM-ICU: Overall CAM-ICU: Positive  E: Early(intubated/ Progressive (non-intubated) Mobility   MOVE Screen: Fail   Activity: Activity Management: Patient unable to perform activities  FAS: Feeding/Nutrition   Diet order: Diet/Nutrition Received: tube feeding,   Fluid restriction:    T: Thrombus   DVT prophylaxis: VTE Required Core Measure: Pharmacological prophylaxis initiated/maintained  H: HOB Elevation   Head of Bed (HOB) Positioning: HOB at 30-45 degrees  U: Ulcer Prophylaxis   GI: yes  G: Glucose control   managed Glycemic Management: blood glucose monitored  S: Skin   Bundle compliance: yes   Bathing/Skin Care: bath, complete Date: AM Bath  B: Bowel Function   constipation   I: Indwelling Catheters   Mace necessity:     CVC necessity: Yes   IPAD offered: No  D: De-escalation Antibx   Yes  Plan for the day   Monitor VS. Patient went into AFIB around 3AM, Dr. Brown notified, 12 Lead EKG Ordered and PO Metoprolol.     Family/Goals of care/Code Status   Code Status: Full Code     No acute events throughout day, VS and assessment per flow sheet, patient progressing towards goals as tolerated, plan of care reviewed with Kylie King and family, all concerns addressed, will continue to monitor

## 2022-04-05 NOTE — PROGRESS NOTES
[facility] - [facility]  Cardiology  Progress Note    Patient Name: Kylie King  MRN: [id number]  Admission Date: 3/30/2022  Hospital Length of Stay: 6 days  Code Status: Full Code   Attending Physician: Gisela Lyles MD   Primary Care Physician: Virginia Foote MD  Expected Discharge Date: 4/7/2022  Principal Problem:NSTEMI (non-ST elevated myocardial infarction)    Subjective:     Hospital Course:   She was subsequently admitted to ED for urgent HD however noted to have NSTEMI and admitted to hospital medicine. Trop notable for rise 4--> 25 with EKG showng IVCD/LBBB and TTE with worsening EF 40%--> 28% and RWMA in LAD and Lcx territory. Per my exam, patient asymptomatic however she was also asymptomatic with last NSTEMI presentation. She was loaded with ASA and plavix and on hep gtt however labwork notable for persistent hyperkalemia K 5.7 Hg 12 Plt 168. She underwent HD on 4/1 but still had persistent hypervolemia. Went for an angiogram on 4/1 due to NSTEMI. Troponin increased to 25. Before cath was started patient went into resp distress and intubated. Placed on CRRT 4/1. Hemodynamics are not consistent with cardiogenic shock. Levo is stable at low doses. She was started on empiric therapy on 4/1. Patient went into AF with RVR the night of 4/5. Pressors were changed to neosynephrine and vaso and she was bolused with 150J of amiodarone to which she converted back into NSR      Interval History: Repeat CXR stable with some clearance of pulmonary edema. Sedation changes to precedex in an effort to wean sedation and improve respiratory drive. Pressors changed to sona synephrine and vaso to limit the amount of beta agonist the patient receives as she went into AF with RVR with rates sustaining in the 160-170s.    Review of Systems   Unable to perform ROS: Intubated   Objective:     Vital Signs (Most Recent):  Temp: 98.6 °F (37 °C) (04/05/22 0700)  Pulse: 93 (04/05/22 1300)  Resp: (!) 25 (04/05/22  1300)  BP: (!) 94/59 (04/05/22 1200)  SpO2: 100 % (04/05/22 1300)   Vital Signs (24h Range):  Temp:  [96.7 °F (35.9 °C)-98.6 °F (37 °C)] 98.6 °F (37 °C)  Pulse:  [] 93  Resp:  [4-29] 25  SpO2:  [100 %] 100 %  BP: ()/(44-63) 94/59  Arterial Line BP: ()/(35-66) 121/62     Weight: 95.2 kg (209 lb 14.1 oz)  Body mass index is 36.03 kg/m².     SpO2: 100 %  O2 Device (Oxygen Therapy): ventilator      Intake/Output Summary (Last 24 hours) at 4/5/2022 1358  Last data filed at 4/5/2022 1300  Gross per 24 hour   Intake 5257.08 ml   Output 7420 ml   Net -2162.92 ml         Lines/Drains/Airways       Central Venous Catheter Line  Duration             Trialysis (Dialysis) Catheter 04/01/22 2036 right internal jugular 3 days              Drain  Duration                  Hemodialysis AV Fistula 02/17/22 Right forearm 47 days         NG/OG Tube 04/01/22 1451 Center mouth 3 days              Airway  Duration                  Airway - Non-Surgical 04/01/22 1444 Endotracheal Tube 3 days       Airway Anesthesia 04/01/22 3 days              Arterial Line  Duration             Arterial Line 04/01/22 1555 Left Radial 3 days              Peripheral Intravenous Line  Duration                  Peripheral IV - Single Lumen 03/31/22 2232 20 G Anterior;Left Forearm 4 days         Peripheral IV - Single Lumen 03/31/22 2232 20 G Anterior;Left;Proximal Upper Arm 4 days         Peripheral IV - Single Lumen 04/01/22 1500 20 G Left Antecubital 3 days                    Physical Exam  Vitals and nursing note reviewed.   Constitutional:       Appearance: Normal appearance. She is obese. She is not ill-appearing.   HENT:      Head: Normocephalic and atraumatic.   Cardiovascular:      Rate and Rhythm: Normal rate and regular rhythm.      Pulses: Normal pulses.      Heart sounds: Normal heart sounds. No murmur heard.    No friction rub. No gallop.   Pulmonary:      Effort: Respiratory distress present.      Breath sounds: Rales present.       Comments: intubated  Abdominal:      General: Abdomen is flat. Bowel sounds are normal. There is no distension.      Palpations: Abdomen is soft.      Tenderness: There is no abdominal tenderness.   Musculoskeletal:      Right lower leg: Edema present.      Left lower leg: Edema present.   Skin:     General: Skin is warm.   Neurological:      Comments: Sedated       Significant Labs: CMP   Recent Labs   Lab 04/04/22 0311 04/04/22  1446 04/04/22  2148 04/05/22  0422 04/05/22  1225   *   < > 135* 138 136   K 4.0   < > 4.5 4.6 4.7   CL 98   < > 100 103 100   CO2 22*   < > 17* 19* 20*   *   < > 93 94 120*   BUN 7*   < > 12 7* 6*   CREATININE 1.6*   < > 2.5* 1.6* 1.7*   CALCIUM 8.8   < > 9.1 8.9 8.7   PROT 6.3  --   --   --   --    ALBUMIN 3.1*   < > 3.1* 3.1* 2.9*   BILITOT 1.0  --   --   --   --    ALKPHOS 80  --   --   --   --    AST 51*  --   --   --   --    ALT 70*  --   --   --   --    ANIONGAP 13   < > 18* 16 16   ESTGFRAFRICA 38.2*   < > 22.2* 38.2* 35.5*   EGFRNONAA 33.1*   < > 19.3* 33.1* 30.8*    < > = values in this interval not displayed.     , CBC   Recent Labs   Lab 04/04/22 0311 04/05/22 0422   WBC 5.41 6.31   HGB 10.2* 10.8*   HCT 33.9* 36.2*   * 176     and All pertinent lab results from the last 24 hours have been reviewed.    Significant Imaging:  All imaging reviewed in the last 24 hours    Assessment and Plan:     * NSTEMI (non-ST elevated myocardial infarction)  Tests     LDL:   Lab Results   Component Value Date    LDLCALC 33.6 (L) 02/09/2021        BNP:   Lab Results   Component Value Date    BNP 4,554 (H) 03/30/2022        TSH:   Lab Results   Component Value Date    TSH 2.540 10/30/2021        HbA1c:   Lab Results   Component Value Date    HGBA1C 5.9 (H) 11/03/2021    HGBA1C 7.8 10/09/2018       MELISSA score: 5  Luana score: 117    Previous [facility] 2/7/22:  · The Ost Cx to Prox Cx lesion was 80% stenosed.  · The 1st Mrg lesion was 60% stenosed.  · The 1st Diag lesion was  "60% stenosed.  · The estimated blood loss was <50 mL.   She has LCX and Diag disease but the LCX is the most important lesion.  Because this is an ostial lesion, will do LAD/LCX "V" stenting.      LHC on 4/5/22:  1. There was inferior posterior dyskinesis consistent with recent MI and an LVEDP of 30 mmHg.  2. The left main bifurcation was widely patent.  3. The ostial LAD and ostial left circumflex stents were widely patent.  4. The first diagonal branch, which was large, had two lesion in the distal small vessel of 90%.  5. The first OMB had 90% stenosis in a small vessel as well.  6. The RCA was widely patent.  No collaterals were seen.          PLAN:  - attempted LHC with IC on 4/1 but batient became acutely hypertenive, tachycardiac and hypoxic requiring the LHC to be aborted.  Repeat LHC on 4/4 showed no vessels that needed emergenct PCI  - Maintain on telemetry and repeat EKG in the AM to look for any subtle changes  - Up to date risk stratification labs to be ordered and addressed appropriately     --> TSH, Lipids, HbA1c    - ACS Core Measures     Heparin / Enoxaparin: heparin gtt     Aspirin + Brillinta     Beta-Blocker: on hold while on pressors     ACE/ARB: On hold      Statin: Atorvastatin         Hypertensive emergency  PLAN:  -- Resolved now on pressors    ESRD (end stage renal disease)  Undergoing CRRT since 4/1    COPD without exacerbation  Patient is intubated    Type 2 diabetes mellitus with diabetic peripheral angiopathy and gangrene, with long-term current use of insulin     HbA1c:   Lab Results   Component Value Date    HGBA1C 5.9 (H) 11/03/2021    HGBA1C 7.8 10/09/2018     PLAN:   -- LDSSI + 2 U of detemir BID  -- Goal while inpatient 140-180  -- POC BG Q6H  -- RD for TF recs while intubated - will start trickle feeds 4/3 if still intubated      Atrial fibrillation  [id number] Scoring System Interpretation   (CHF, HTN, Age > 75 (2), DM II, Stroke, Vascular Disease Hx, Age > 65 (1), Sex " Female)  [id number] Score  Adjusted Stroke Risk   0    0 %   1    1.3 %   2    2.2 %   3    3.2 %    4    4.0 %   5    6.7 %   6    9.8 %   7    9.6 %   8    6.7 %    9    15.2 %    HAS-BLED Scoring System Interpretation   (Hypertension, Abnormal Liver and Kidney Function, Stroke, Bleeding Tendency, Labile INR, Elderly, Drugs (EtOH and ASA/NSAIDs)    HAS-BLED Score  Bleeds per 100 patient years   0           1.13   1           1.02   2           1.88   3           3.74   4           8.70   5-9                 Insufficient Data      PLAN:  - Maintain K > 4, Mag > 2 and Ca/iCal WNL to decrease arrhythmogenic potential  - Cycle troponin to rule out ischemic etiology  - Rhythm control on Amiodarone   - Rate control on Metoprolol  - A/C control with heparin gtt    --> [id number] score 4    - Maintain on telemetry and daily morning    --> Please perform EKG if the patient converts spontaneously     Anemia in ESRD (end-stage renal disease)  Management per nephro    Acute on chronic respiratory failure  Hypervolemia controlled with HD  Patient is currently intubated    End-stage renal disease on hemodialysis  Patient is ESRD on HD TTS has right upper ext forearm fistula. upgraded to ICU today   Last HD was done 3/31/22 with with 2L out   Patient has been neg 4 Liters since admission     She went to cath lab 4/1 for MI and stent thrombosis for recent stent that she had 3 weeks ago. Before cath was started patient went into resp distress and intubated. Patient is hypervolemic and hypertensive.       PLAN:  -- on CRRT    Acute on chronic combined systolic and diastolic heart failure  ICM NYHA Class III Acute on Chronic Systolic and diastolic HF Stage C: Structural heart disease with prior or current symptoms of HF.  - Most recent TTE demonstrates: LVef 28 %, Grade II DD - Mild AS Aortic valve area is 1.62 cm2; peak velocity is 1.46 m/s; mean gradient is 4 mmHg. CVP 15   - EKG : NSR with QTc 510  - Troponin 24.436  BNP  4,727     PLAN:  - Maintain on telemetry    - Up to date risk stratification : TSH, Lipids, HbA1c with optimization of risk factors is necessary  - Check Electrolytes, keep Mag >2 & K+ >4  - SCDs, TEDs, Nursing communication to elevated LE   - Ambulate as tolerated    - Optimal therapy at this time to include :    - Anti-platelet agent with ASA + ticagalor    - ACE/ARB hold at this time on pressors    - BB  hold at this time on pressors and up titrate as hemodynamics allow    - Statin with liptor 80 mg QD      - Follow up transitional care visit for heart failure at discharge    History of breast cancer  Per oncology - last seen By Dr. Mclain in 2015  carcinoma of the left breast T2N0 Stage IIA Strongly ER and ND positive, HER-2 negative.  Oncotype .Score returned at 6 indicating a low risk and a 5% relapse rate with hormonal therapy alone.She initiated Letrozole hormonal therapy in April 2013.  .    She had developed a bloody nipple discharge in the latter part of 2012 and a mammogram on December 31 showed asymmetric densities in the left breast at the 5:00 position extending posteriorly.  Ultrasound showed several solid masses extending into the lower outer quadrant.  On January 8 ultrasound-guided biopsy showed an infiltrating ductal carcinoma intermediate grade (3+2+1), 100%  ER-positive, 100%  ND positive, HER-2 1+.  On January 25 right and left mastectomy were performed which showed a 4.4 cm intraductal carcinoma grade 2 with positive deep margin involving skeletal muscle the pectoralis.  Gunter lymph node showed multifocal clusters of tumor cells all less than 1 her soles, final pathological stage T2 N0 (IHC positive) stage II A. The right breast was unremarkable pathologically.    Hyperlipidemia     LDL:   Lab Results   Component Value Date    LDLCALC 33.6 (L) 02/09/2021       PLAN:  -- Continue High intensity statin       VTE Risk Mitigation (From admission, onward)         Ordered     heparin 25,000 units  in dextrose 5% (100 units/ml) IV bolus from bag - ADDITIONAL PRN BOLUS - 60 units/kg  As needed (PRN)        Question:  Heparin Infusion Adjustment (DO NOT MODIFY ANSWER)  Answer:  [email or web address]\Images\Pharmacy\HeparinInfusions\heparin LOW INTENSITY nomogram for [facility] [id number]    04/05/22 0421     heparin 25,000 units in dextrose 5% (100 units/ml) IV bolus from bag - ADDITIONAL PRN BOLUS - 30 units/kg  As needed (PRN)        Question:  Heparin Infusion Adjustment (DO NOT MODIFY ANSWER)  Answer:  [email or web address]\Images\Pharmacy\HeparinInfusions\heparin LOW INTENSITY nomogram for [facility] [id number]    04/05/22 0421     heparin 25,000 units in dextrose 5% 250 mL (100 units/mL) infusion LOW INTENSITY nomogram - [facility]  Continuous        Question Answer Comment   Heparin Infusion Adjustment (DO NOT MODIFY ANSWER) [email or web address]\epic\Images\Pharmacy\HeparinInfusions\heparin LOW INTENSITY nomogram for [facility] [id number]    Begin at (in units/kg/hr) 12        04/05/22 0421     heparin (porcine) injection 1,000 Units  As needed (PRN)         04/01/22 1523     Reason for No Pharmacological VTE Prophylaxis  Once        Question:  Reasons:  Answer:  Already adequately anticoagulated on oral Anticoagulants    04/01/22 1525     IP VTE HIGH RISK PATIENT  Once         04/01/22 1525     Place sequential compression device  Until discontinued         03/30/22 1922                Lily Bourgeois DO  Cardiology  [facility] - Card[facility]

## 2022-04-05 NOTE — NURSING
Pt HR sustaining greater than 110, irregular rhythm. Previus rhythm SB-NS 50s-60s. MD Wood notified, order for 12 lead EKG. Sent to [facility]. [facility]

## 2022-04-05 NOTE — NURSING
0737 Pt Afib RVR in 170s. Sleem DO notified. Ordered Amio bolus.    0751 Sleem DO ordered to turn off levo, start Vaso gtt to maintain map goal >60.    0758 Sleem DO notified of hypotension (map in 50s) with Vaso added 1 Tavares bump and Tavares gtt ordered.    Pt stabilized on Vaso/Tavares maintaining map goal >60.    1130 Per Sleem DO wean fentanyl and propofol gtt off as tolerated.

## 2022-04-05 NOTE — ASSESSMENT & PLAN NOTE
Patient is ESRD on HD TTS has right upper ext forearm fistula.     She went to cath lab Friday 4/1 for MI and stent thrombosis for recent stent that she had 3 weeks ago. Before cath was started patient went into resp distress and intubated. Patient was hypervolemic and hypertensive.     Plan:   intubated on 32% %Fio2 on vent. Seen on SLEDD this morning patient neg 137 cc yesterday till this morning patient clotted twice on SLED hep gtt was started. She is neg 615 cc since this morning. Will change to SCUFF for 8 hours then change back to SLED. Chest xray today shows improving pulm edema

## 2022-04-05 NOTE — PROGRESS NOTES
Cardiology Update Note    Patient HR noted to increase, BP lower due to continued cRRT, levo requirements increased overnight 0.02 > 0.04 > 0.08, likely worsening tachycardia. Known afib as noted in previous documentation. Requested drop in UF to 380 cc/hr and started home metoprolol tartrate 25 mg PO BID to assist in controlling HR. Already on home amiodarone 200 mg PO Daily. On Hep gtt for AC considering CHADS2-VASc 6. Thi s should help bring BP up and hopefully decrease levo requirements. Goal is to get back to UF of 400 cc/hr once HR better controlled.     Addison Wood MD  Cardiology PGY4   [facility]

## 2022-04-05 NOTE — ASSESSMENT & PLAN NOTE
Per oncology - last seen By Dr. Mclain in 2015  carcinoma of the left breast T2N0 Stage IIA Strongly ER and TX positive, HER-2 negative.  Oncotype .Score returned at 6 indicating a low risk and a 5% relapse rate with hormonal therapy alone.She initiated Letrozole hormonal therapy in April 2013.  .    She had developed a bloody nipple discharge in the latter part of 2012 and a mammogram on December 31 showed asymmetric densities in the left breast at the 5:00 position extending posteriorly.  Ultrasound showed several solid masses extending into the lower outer quadrant.  On January 8 ultrasound-guided biopsy showed an infiltrating ductal carcinoma intermediate grade (3+2+1), 100%  ER-positive, 100%  TX positive, HER-2 1+.  On January 25 right and left mastectomy were performed which showed a 4.4 cm intraductal carcinoma grade 2 with positive deep margin involving skeletal muscle the pectoralis.  Davy lymph node showed multifocal clusters of tumor cells all less than 1 her soles, final pathological stage T2 N0 (IHC positive) stage II A. The right breast was unremarkable pathologically.

## 2022-04-05 NOTE — ASSESSMENT & PLAN NOTE
ICM NYHA Class III Acute on Chronic Systolic and diastolic HF Stage C: Structural heart disease with prior or current symptoms of HF.  - Most recent TTE demonstrates: LVef 28 %, Grade II DD - Mild AS Aortic valve area is 1.62 cm2; peak velocity is 1.46 m/s; mean gradient is 4 mmHg. CVP 15   - EKG : NSR with QTc 510  - Troponin 24.436  BNP 4,554     PLAN:  - Maintain on telemetry    - Up to date risk stratification : TSH, Lipids, HbA1c with optimization of risk factors is necessary  - Check Electrolytes, keep Mag >2 & K+ >4  - SCDs, TEDs, Nursing communication to elevated LE   - Ambulate as tolerated    - Optimal therapy at this time to include :    - Anti-platelet agent with ASA + ticagalor    - ACE/ARB hold at this time on pressors    - BB  hold at this time on pressors and up titrate as hemodynamics allow    - Statin with liptor 80 mg QD      - Follow up transitional care visit for heart failure at discharge

## 2022-04-05 NOTE — ASSESSMENT & PLAN NOTE
[id number] Scoring System Interpretation   (CHF, HTN, Age > 75 (2), DM II, Stroke, Vascular Disease Hx, Age > 65 (1), Sex Female)  [id number] Score  Adjusted Stroke Risk   0    0 %   1    1.3 %   2    2.2 %   3    3.2 %    4    4.0 %   5    6.7 %   6    9.8 %   7    9.6 %   8    6.7 %    9    15.2 %    HAS-BLED Scoring System Interpretation   (Hypertension, Abnormal Liver and Kidney Function, Stroke, Bleeding Tendency, Labile INR, Elderly, Drugs (EtOH and ASA/NSAIDs)    HAS-BLED Score  Bleeds per 100 patient years   0           1.13   1           1.02   2           1.88   3           3.74   4           8.70   5-9                 Insufficient Data      PLAN:  - Maintain K > 4, Mag > 2 and Ca/iCal WNL to decrease arrhythmogenic potential  - Cycle troponin to rule out ischemic etiology  - Rhythm control on Amiodarone   - Rate control on Metoprolol  - A/C control with heparin gtt    --> [id number] score 4    - Maintain on telemetry and daily morning    --> Please perform EKG if the patient converts spontaneously

## 2022-04-05 NOTE — PROGRESS NOTES
04/05/22 0710   Treatment   Treatment Type SLED   Treatment Status Restart   Dialysis Machine Number [id number]   Dialyzer Time (hours) 7.35   BVP (Liters) 0 L   Solutions Labeled and Current  Yes   Access Temporary Cath;Right;IJ   Catheter Dressing Intact  Yes   Alarms Engaged Yes   Prescription   Time (Hours) Continuous   Dialysate K + (mEq/L) 4   Dialysate CA + (mEq/L) 2.25   Dialysate HCO3 - (Bicarb) (mEq/L) 30   Dialysate Na + (mEq/L) 140   Cartridge Type   (Survature 300)   Dialysate Flow Rate (mL/min) 200   UF Goal Rate 400 mL/hr   CRRT Hourly Documentation   Blood Flow (mL/min) 150   UF Rate 400 cc/hr   Arterial Pressure (mmHg) -50 mmHg   Venous Pressure (mmHg) 40 mmHg   Effluent Pressure (EP) (mmHg) 50 mmHg   Total UF (Hourly Cleared) (mL) 0   Continuous CRRT restarted, lines secure and access visible.

## 2022-04-05 NOTE — NURSING
Spoke with Kassandra about patient's HR in 40s SB. Tavares is off, titrate down on fentanyl as tolerated.

## 2022-04-05 NOTE — CONSULTS
[facility] - Cardiac Intensive Care  Wound Care    Patient Name:  Kylie King   MRN:  [id number]  Date: 2022  Diagnosis: NSTEMI (non-ST elevated myocardial infarction)    History:     Past Medical History:   Diagnosis Date    Anxiety     Breast cancer     left    Carotid artery disease     Cataract     Choledocholithiasis     s/p ERCP and stent placement    Chronic combined systolic and diastolic congestive heart failure     Chronic obstructive pulmonary disease     Chronic venous insufficiency     Depression     End-stage renal disease on hemodialysis     M/W/F    GERD (gastroesophageal reflux disease)     History of breast cancer     History of colon cancer     s/p sigmoid colectomy    History of kidney stones     History of osteomyelitis of left foot     History of pancreatitis     History of stroke     Hyperlipidemia     Hypertension     Intracerebral hemorrhage     Lumbar degenerative disc disease     Lumbar spinal stenosis     Morbid obesity     Paroxysmal atrial fibrillation     Peripheral artery disease     Peripheral neuropathy     Pubic ramus fracture 2022    Tobacco dependence     Type II diabetes mellitus     Urinary incontinence        Social History     Socioeconomic History    Marital status: Single    Number of children: 2   Occupational History    Occupation: not working     Employer: [employer]   Tobacco Use    Smoking status: Current Some Day Smoker     Packs/day: 0.50     Years: 28.00     Pack years: 14.00     Types: Cigarettes     Start date: 1990     Last attempt to quit: 2018     Years since quittin.2    Smokeless tobacco: Never Used    Tobacco comment: anxious   Substance and Sexual Activity    Alcohol use: No    Drug use: No    Sexual activity: Not Currently     Partners: Male   Social History Narrative    She is not exercising regularly     Social Determinants of Health     Financial Resource Strain: Low Risk      Difficulty of Paying Living Expenses: Not hard at all   Food Insecurity: No Food Insecurity    Worried About Running Out of Food in the Last Year: Never true    Ran Out of Food in the Last Year: Never true   Transportation Needs: No Transportation Needs    Lack of Transportation (Medical): No    Lack of Transportation (Non-Medical): No   Physical Activity: Insufficiently Active    Days of Exercise per Week: 3 days    Minutes of Exercise per Session: 20 min   Stress: No Stress Concern Present    Feeling of Stress : Only a little   Social Connections: Moderately Isolated    Frequency of Communication with Friends and Family: More than three times a week    Frequency of Social Gatherings with Friends and Family: Twice a week    Attends Holiness Services: Never    Active Member of Clubs or Organizations: No    Attends Club or Organization Meetings: Never    Marital Status:    Housing Stability: Low Risk     Unable to Pay for Housing in the Last Year: No    Number of Places Lived in the Last Year: 1    Unstable Housing in the Last Year: No       Precautions:     Allergies as of 03/30/2022 - Reviewed 03/30/2022   Allergen Reaction Noted    Cyclobenzaprine Hallucinations 02/26/2013    Erythromycin  01/08/2013    Keflex [cephalexin]  01/02/2014    Erythromycin base  04/06/2021       [facility] Assessment Details/Treatment   Wound care consulted for BLE and feet  The skin to the BLE and feet is intact, pink.  Observed intact scabs over 3 toes on the right foot. Heels intact- off-loaded on heel protectors.     Plan:  Nursing to continue care, pressure prevention measures  Wound care will follow-up prn.  Buttocks- triad ointment BID/prn- loose stools    Recommendations made to primary team for above plan per secure chat  . Orders placed.           04/05/2022

## 2022-04-05 NOTE — ASSESSMENT & PLAN NOTE
"Tests     LDL:   Lab Results   Component Value Date    LDLCALC 33.6 (L) 02/09/2021        BNP:   Lab Results   Component Value Date    BNP 4,554 (H) 03/30/2022        TSH:   Lab Results   Component Value Date    TSH 2.540 10/30/2021        HbA1c:   Lab Results   Component Value Date    HGBA1C 5.9 (H) 11/03/2021    HGBA1C 7.8 10/09/2018       MELISSA score: 5  Luana score: 117    Previous LHC 2/7/22:  · The Ost Cx to Prox Cx lesion was 80% stenosed.  · The 1st Mrg lesion was 60% stenosed.  · The 1st Diag lesion was 60% stenosed.  · The estimated blood loss was <50 mL.   She has LCX and Diag disease but the LCX is the most important lesion.  Because this is an ostial lesion, will do LAD/LCX "V" stenting.      LHC on 4/5/22:  1. There was inferior posterior dyskinesis consistent with recent MI and an LVEDP of 30 mmHg.  2. The left main bifurcation was widely patent.  3. The ostial LAD and ostial left circumflex stents were widely patent.  4. The first diagonal branch, which was large, had two lesion in the distal small vessel of 90%.  5. The first OMB had 90% stenosis in a small vessel as well.  6. The RCA was widely patent.  No collaterals were seen.          PLAN:  - attempted LHC with IC on 4/1 but batient became acutely hypertenive, tachycardiac and hypoxic requiring the LHC to be aborted.  Repeat LHC on 4/4 showed no vessels that needed emergenct PCI  - Maintain on telemetry and repeat EKG in the AM to look for any subtle changes  - Up to date risk stratification labs to be ordered and addressed appropriately     --> TSH, Lipids, HbA1c    - ACS Core Measures     Heparin / Enoxaparin: heparin gtt     Aspirin + Brillinta     Beta-Blocker: on hold while on pressors     ACE/ARB: On hold      Statin: Atorvastatin       "

## 2022-04-05 NOTE — CARE UPDATE
Cardiology Update Note    Patient went into paroxysmal Afib (known per documentation), CHADS2-VASC documented as 4 but considering age 66, and reported CAD + HTN + T2DM + CVA leads to CHADS2-VASc of 6. On Eliquis at home. Start heparin gtt for afib for now considering patient is on TF, and Eliquis reportedly not compatible with TF.  Levo up to 0.04 with UF rate of 400 cc/hr.   - Monitor Plt count (123 k cells/uL 04/04)  - Continue the same fr now as we need volume removal, will titrate levo as tolerated.   - Keeping PEEP at 10 for now and FiO2 32% for pulmonary decongestion but may re-evaluate in AM.     Please call with questions or concerns.     Addison Wood MD  Cardiology PGY4  [facility]

## 2022-04-05 NOTE — SUBJECTIVE & OBJECTIVE
Interval History: intubated on 32% %Fio2 on vent. Seen on SLEDD this morning patient neg 137 cc yesterday till this morning patient clotted twice on SLED hep gtt was started. She is neg 615 cc since this morning     Review of patient's allergies indicates:   Allergen Reactions    Cyclobenzaprine Hallucinations    Erythromycin      Stomach upset    Keflex [cephalexin]      Yeast infection    Erythromycin base      Other reaction(s): upset stomach     Current Facility-Administered Medications   Medication Frequency    0.9%  NaCl infusion (CRRT USE ONLY) Continuous    0.9%  NaCl infusion Continuous    acetaminophen tablet 650 mg Q8H PRN    amiodarone in dextrose 150 mg/100 mL (1.5 mg/mL) loading dose 150 mg Once    amiodarone tablet 200 mg Daily    aspirin chewable tablet 81 mg Daily    atorvastatin tablet 80 mg Daily    chlorhexidine 0.12 % solution 15 mL BID    dextrose 10% bolus 125 mL PRN    famotidine (PF) injection 20 mg Daily    fentaNYL 2500 mcg in 0.9% sodium chloride 250 mL infusion premix (titrating) Continuous    fentaNYL 50 mcg/mL injection 25 mcg Q15 Min PRN    gelatin adsorbable 12-7 mm top sponge sponge 1 applicator PRN    glucagon (human recombinant) injection 1 mg PRN    heparin (porcine) injection 1,000 Units PRN    heparin 25,000 units in dextrose 5% (100 units/ml) IV bolus from bag - ADDITIONAL PRN BOLUS - 30 units/kg PRN    heparin 25,000 units in dextrose 5% (100 units/ml) IV bolus from bag - ADDITIONAL PRN BOLUS - 60 units/kg PRN    heparin 25,000 units in dextrose 5% 250 mL (100 units/mL) infusion LOW INTENSITY nomogram - [facility] Continuous    insulin aspart U-100 pen 0-5 Units Q6H PRN    insulin detemir U-100 pen 2 Units BID    magnesium sulfate 2g in water 50mL IVPB (premix) PRN    melatonin tablet 6 mg Nightly    metoprolol tartrate (LOPRESSOR) tablet 25 mg BID    miconazole NITRATE 2 % top powder BID    naloxone 0.4 mg/mL injection 0.02 mg PRN    ondansetron disintegrating tablet 8 mg Q8H PRN     phenylephrine (EDILBERTO-SYNEPHRINE) 100 mg in sodium chloride 0.9% 250 mL infusion Continuous    piperacillin-tazobactam 4.5 g in sodium chloride 0.9% 100 mL IVPB (ready to mix system) Q8H    polyethylene glycol packet 17 g BID PRN    propofol (DIPRIVAN) 10 mg/mL infusion Continuous    sodium chloride 0.9% bolus 250 mL PRN    sodium chloride 0.9% bolus 250 mL PRN    sodium chloride 0.9% bolus 250 mL PRN    sodium chloride 0.9% flush 10 mL PRN    ticagrelor tablet 90 mg BID    vasopressin (PITRESSIN) 100 Units in dextrose 5 % 100 mL infusion Continuous    vasopressin (PITRESSIN) 20 unit/mL injection        Objective:     Vital Signs (Most Recent):  Temp: 96.7 °F (35.9 °C) (04/05/22 0300)  Pulse: (!) 125 (04/05/22 0732)  Resp: (!) 25 (04/05/22 0732)  BP: (!) 111/44 (04/05/22 0718)  SpO2: 100 % (04/05/22 0732)  O2 Device (Oxygen Therapy): ventilator (04/05/22 0732)   Vital Signs (24h Range):  Temp:  [96.7 °F (35.9 °C)-97.6 °F (36.4 °C)] 96.7 °F (35.9 °C)  Pulse:  [] 125  Resp:  [4-26] 25  SpO2:  [93 %-100 %] 100 %  BP: ()/(44-55) 111/44  Arterial Line BP: ()/(38-51) 108/43     Weight: 95.2 kg (209 lb 14.1 oz) (04/04/22 0400)  Body mass index is 36.03 kg/m².  Body surface area is 2.07 meters squared.    I/O last 3 completed shifts:  In: 9972.2 [I.V.:8676.9; NG/GT:505; IV Piggyback:790.3]  Out: 96770 [Drains:225; Other:19396]    Physical Exam  Vitals reviewed.   Constitutional:       Appearance: She is obese. She is ill-appearing.      Comments: Sedated and intubated    Cardiovascular:      Rate and Rhythm: Normal rate and regular rhythm.      Heart sounds: Normal heart sounds.   Pulmonary:      Effort: Pulmonary effort is normal.      Breath sounds: Normal breath sounds.      Comments: Intubated   B/L air entry   Abdominal:      General: Bowel sounds are normal.      Palpations: Abdomen is soft.   Musculoskeletal:      Right lower leg: No edema.      Left lower leg: No edema.   Skin:     Coloration: Skin  is not jaundiced.      Findings: No rash.   Neurological:      Comments: Sedated        Significant Labs:  CBC:   Recent Labs   Lab 04/05/22  0422   WBC 6.31   RBC 4.03   HGB 10.8*   HCT 36.2*      MCV 90   MCH 26.8*   MCHC 29.8*       CMP:   Recent Labs   Lab 04/04/22  0311 04/04/22  1446 04/05/22  0422   *   < > 94   CALCIUM 8.8   < > 8.9   ALBUMIN 3.1*   < > 3.1*   PROT 6.3  --   --    *   < > 138   K 4.0   < > 4.6   CO2 22*   < > 19*   CL 98   < > 103   BUN 7*   < > 7*   CREATININE 1.6*   < > 1.6*   ALKPHOS 80  --   --    ALT 70*  --   --    AST 51*  --   --    BILITOT 1.0  --   --     < > = values in this interval not displayed.          Significant Imaging:  Reviewed

## 2022-04-05 NOTE — PROGRESS NOTES
04/04/22 8740   Treatment   Treatment Status Restart   Dialysis Machine Number K22   Solutions Labeled and Current  Yes   Access Temporary Cath;Right;IJ   Catheter Dressing Intact  Yes   Alarms Engaged Yes   Prescription   Time (Hours) Continuous   Dialysate K + (mEq/L) 4   Dialysate CA + (mEq/L) 2.25   Dialysate HCO3 - (Bicarb) (mEq/L) 30   Dialysate Na + (mEq/L) 140   Cartridge Type   ([employer]ar 300)   Dialysate Flow Rate (mL/min) 200   UF Goal Rate 400 mL/hr   CRRT Hourly Documentation   Blood Flow (mL/min) 150   UF Rate 400 cc/hr   Arterial Pressure (mmHg) -10 mmHg   Venous Pressure (mmHg) 50 mmHg   Effluent Pressure (EP) (mmHg) 50 mmHg   Total UF (Hourly Cleared) (mL) 0   Continuous CRRT restarted, lines secure and access visible.

## 2022-04-05 NOTE — CARE UPDATE
Cardiology Update Note    Patient seen at bedside at 6:40 pm, increased PEEP to 10 due to volume overload (LVEDP 30 in cath lab) and decreased FiO2 to 32%. When performing sedation vacations, would recommend cRRT to remain on to help clear fentanyl (renally cleared).  - Maintain higher PEEP to address pulmonary edema   - SAT/SBT in the AM with sedation vacation. Also check neuro function  - Maintain UF rate at 400 cc/hr per Nephrology recommendations. Goal net negative 2 L in 24 hours.     Please call with questions or concerns.     Addison Wood MD  Cardiology PGY4  [facility]

## 2022-04-05 NOTE — SUBJECTIVE & OBJECTIVE
Interval History: Repeat CXR stable with some clearance of pulmonary edema. Sedation changes to precedex in an effort to wean sedation and improve respiratory drive. Pressors changed to sona synephrine and vaso to limit the amount of beta agonist the patient receives as she went into AF with RVR with rates sustaining in the 160-170s.    Review of Systems   Unable to perform ROS: Intubated   Objective:     Vital Signs (Most Recent):  Temp: 98.6 °F (37 °C) (04/05/22 0700)  Pulse: 93 (04/05/22 1300)  Resp: (!) 25 (04/05/22 1300)  BP: (!) 94/59 (04/05/22 1200)  SpO2: 100 % (04/05/22 1300)   Vital Signs (24h Range):  Temp:  [96.7 °F (35.9 °C)-98.6 °F (37 °C)] 98.6 °F (37 °C)  Pulse:  [] 93  Resp:  [4-29] 25  SpO2:  [100 %] 100 %  BP: ()/(44-63) 94/59  Arterial Line BP: ()/(35-66) 121/62     Weight: 95.2 kg (209 lb 14.1 oz)  Body mass index is 36.03 kg/m².     SpO2: 100 %  O2 Device (Oxygen Therapy): ventilator      Intake/Output Summary (Last 24 hours) at 4/5/2022 1358  Last data filed at 4/5/2022 1300  Gross per 24 hour   Intake 5257.08 ml   Output 7420 ml   Net -2162.92 ml         Lines/Drains/Airways       Central Venous Catheter Line  Duration             Trialysis (Dialysis) Catheter 04/01/22 2036 right internal jugular 3 days              Drain  Duration                  Hemodialysis AV Fistula 02/17/22 Right forearm 47 days         NG/OG Tube 04/01/22 1451 Center mouth 3 days              Airway  Duration                  Airway - Non-Surgical 04/01/22 1444 Endotracheal Tube 3 days       Airway Anesthesia 04/01/22 3 days              Arterial Line  Duration             Arterial Line 04/01/22 1555 Left Radial 3 days              Peripheral Intravenous Line  Duration                  Peripheral IV - Single Lumen 03/31/22 2232 20 G Anterior;Left Forearm 4 days         Peripheral IV - Single Lumen 03/31/22 2232 20 G Anterior;Left;Proximal Upper Arm 4 days         Peripheral IV - Single Lumen 04/01/22  1500 20 G Left Antecubital 3 days                    Physical Exam  Vitals and nursing note reviewed.   Constitutional:       Appearance: Normal appearance. She is obese. She is not ill-appearing.   HENT:      Head: Normocephalic and atraumatic.   Cardiovascular:      Rate and Rhythm: Normal rate and regular rhythm.      Pulses: Normal pulses.      Heart sounds: Normal heart sounds. No murmur heard.    No friction rub. No gallop.   Pulmonary:      Effort: Respiratory distress present.      Breath sounds: Rales present.      Comments: intubated  Abdominal:      General: Abdomen is flat. Bowel sounds are normal. There is no distension.      Palpations: Abdomen is soft.      Tenderness: There is no abdominal tenderness.   Musculoskeletal:      Right lower leg: Edema present.      Left lower leg: Edema present.   Skin:     General: Skin is warm.   Neurological:      Comments: Sedated       Significant Labs: CMP   Recent Labs   Lab 04/04/22  0311 04/04/22  1446 04/04/22  2148 04/05/22  0422 04/05/22  1225   *   < > 135* 138 136   K 4.0   < > 4.5 4.6 4.7   CL 98   < > 100 103 100   CO2 22*   < > 17* 19* 20*   *   < > 93 94 120*   BUN 7*   < > 12 7* 6*   CREATININE 1.6*   < > 2.5* 1.6* 1.7*   CALCIUM 8.8   < > 9.1 8.9 8.7   PROT 6.3  --   --   --   --    ALBUMIN 3.1*   < > 3.1* 3.1* 2.9*   BILITOT 1.0  --   --   --   --    ALKPHOS 80  --   --   --   --    AST 51*  --   --   --   --    ALT 70*  --   --   --   --    ANIONGAP 13   < > 18* 16 16   ESTGFRAFRICA 38.2*   < > 22.2* 38.2* 35.5*   EGFRNONAA 33.1*   < > 19.3* 33.1* 30.8*    < > = values in this interval not displayed.     , CBC   Recent Labs   Lab 04/04/22 0311 04/05/22  0422   WBC 5.41 6.31   HGB 10.2* 10.8*   HCT 33.9* 36.2*   * 176     and All pertinent lab results from the last 24 hours have been reviewed.    Significant Imaging:  All imaging reviewed in the last 24 hours

## 2022-04-06 PROBLEM — Z85.038 HISTORY OF COLON CANCER: Chronic | Status: RESOLVED | Noted: 2018-06-08 | Resolved: 2022-01-01

## 2022-04-06 PROBLEM — R57.9 SHOCK: Status: ACTIVE | Noted: 2022-01-01

## 2022-04-06 NOTE — NURSING
Sleem DO notified Pt not responding to pain on any extremity. Only responds non purposefully to sternal rub. No new orders at this time.

## 2022-04-06 NOTE — CODE DOCUMENTATION
MD Ventura, main provider during code     0442 and 17 seconds: RINKU Samaniego, sitting outside of patient's room when monitor suddenly started alarming VT. RN ran into room and immediately palpated centrally for a pulse. No pulse was palpated and CPR was started immediately by myself.  RN screamed out for code cart and code cart was rushed into the room. At this time, EICU button was pressed and Beba, charge nurse arrived to room. CPR in progress.     0442 and 26 seconds patient's VS are , A line BP 25/13 (20) patient in pulseless VT. CPR ongoing    0445 patient rhythm is now VF. Zoll identified as shockable rhythm, charged to 140J and patient shocked x1.     0446 1mg Epi given     0447 femoral pulse palpated;  (ST) on monitor; compressions stopped     0454 pt in SVT with ; verbal orders per MD Ventura, to push 6mg of Adenosine. Mg 1.8 from AM labs. Verbal orders to hang 2g Mg STAT. 2g Mg spiked and hung.     0456 ABG drawn from A Line     0458 Adenosine drawn up and slammed via IV and immediately followed by 2 flushes. Pt HR decreased from 154 to 102     0500 pt HR 96 and A line /66. ISTAT ABG resulted. See results review     0506 code end. ROSC achieved. See flow sheets for filed VS info

## 2022-04-06 NOTE — ASSESSMENT & PLAN NOTE
Patient is ESRD on HD TTS has right upper ext forearm fistula.     She went to cath lab Friday 4/1 for MI and stent thrombosis for recent stent that she had 3 weeks ago. Before cath was started patient went into resp distress and intubated. Patient was hypervolemic and hypertensive.     Plan:    patieent had cardiac arrest overnight SLED was held. Patient is neg 1.8L in last 24 hours    has metabolic acidosis. K 5.1 will start SLED and to keep patient net neg

## 2022-04-06 NOTE — SUBJECTIVE & OBJECTIVE
Interval History: Episode of PMVT overnight and the patient wascoded - ROSC was obtained. Family was called and patient was made DNR by her son. Will continue to support patient and attempt to give her more metabolic clearance with SLED.    Review of Systems   Unable to perform ROS: Intubated   Objective:     Vital Signs (Most Recent):  Temp: 98.24 °F (36.8 °C) (04/06/22 1023)  Pulse: (!) 59 (04/06/22 1023)  Resp: (!) 27 (04/06/22 1023)  BP: (!) 157/65 (04/05/22 1600)  SpO2: 100 % (04/06/22 1023)   Vital Signs (24h Range):  Temp:  [94.1 °F (34.5 °C)-98.96 °F (37.2 °C)] 98.24 °F (36.8 °C)  Pulse:  [] 59  Resp:  [] 27  SpO2:  [86 %-100 %] 100 %  BP: ()/(59-65) 157/65  Arterial Line BP: ()/(10-90) 140/52     Weight: 95.2 kg (209 lb 14.1 oz)  Body mass index is 36.03 kg/m².     SpO2: 100 %  O2 Device (Oxygen Therapy): ventilator      Intake/Output Summary (Last 24 hours) at 4/6/2022 1123  Last data filed at 4/6/2022 1100  Gross per 24 hour   Intake 4733.9 ml   Output 6320 ml   Net -1586.1 ml         Lines/Drains/Airways       Central Venous Catheter Line  Duration             Trialysis (Dialysis) Catheter 04/01/22 2036 right internal jugular 4 days              Drain  Duration                  Hemodialysis AV Fistula 02/17/22 Right forearm 48 days         NG/OG Tube 04/01/22 1451 Center mouth 4 days              Airway  Duration                  Airway - Non-Surgical 04/01/22 1444 Endotracheal Tube 4 days       Airway Anesthesia 04/01/22 4 days              Arterial Line  Duration             Arterial Line 04/01/22 1555 Left Radial 4 days              Peripheral Intravenous Line  Duration                  Peripheral IV - Single Lumen 03/31/22 2232 20 G Anterior;Left Forearm 5 days         Peripheral IV - Single Lumen 03/31/22 2232 20 G Anterior;Left;Proximal Upper Arm 5 days         Peripheral IV - Single Lumen 04/01/22 1500 20 G Left Antecubital 4 days         Peripheral IV - Single Lumen  04/05/22 1130 20 G Anterior;Left;Proximal Forearm <1 day                    Physical Exam  Vitals and nursing note reviewed.   Constitutional:       Appearance: Normal appearance. She is obese. She is not ill-appearing.   HENT:      Head: Normocephalic and atraumatic.   Cardiovascular:      Rate and Rhythm: Normal rate and regular rhythm.      Pulses: Normal pulses.      Heart sounds: Normal heart sounds. No murmur heard.    No friction rub. No gallop.   Pulmonary:      Effort: Respiratory distress present.      Breath sounds: Rales present.      Comments: intubated  Abdominal:      General: Abdomen is flat. Bowel sounds are normal. There is no distension.      Palpations: Abdomen is soft.      Tenderness: There is no abdominal tenderness.   Musculoskeletal:      Right lower leg: Edema present.      Left lower leg: Edema present.   Skin:     General: Skin is warm.   Neurological:      Comments: Sedated       Significant Labs: CMP   Recent Labs   Lab 04/05/22  1225 04/05/22  2023 04/06/22  0340    136 139   K 4.7 5.3* 5.1    105 103   CO2 20* 17* 16*   * 144* 120*   BUN 6* 11 5*   CREATININE 1.7* 1.9* 1.0   CALCIUM 8.7 9.2 9.1   ALBUMIN 2.9* 3.1* 3.0*   ANIONGAP 16 14 20*   ESTGFRAFRICA 35.5* 31.0* >60.0   EGFRNONAA 30.8* 26.9* 58.4*     , CBC   Recent Labs   Lab 04/05/22  0422 04/06/22  0340 04/06/22  0456   WBC 6.31 5.88  --    HGB 10.8* 10.4*  --    HCT 36.2* 34.3* 36    160  --      and All pertinent lab results from the last 24 hours have been reviewed.    Significant Imaging:  All imaging reviewed in the last 24 hours

## 2022-04-06 NOTE — PROGRESS NOTES
04/06/22 1011   Treatment   Treatment Type SLED   Treatment Status Restart   Dialysis Machine Number [id number]   Solutions Labeled and Current  Yes   Access Temporary Cath;Right;IJ   Catheter Dressing Intact  Yes   Alarms Engaged Yes     CRRT restarted without issue, pt left in NAD. Primary RN given report at bedside.

## 2022-04-06 NOTE — ASSESSMENT & PLAN NOTE
Per oncology - last seen By Dr. Mclain in 2015  carcinoma of the left breast T2N0 Stage IIA Strongly ER and OK positive, HER-2 negative.  Oncotype .Score returned at 6 indicating a low risk and a 5% relapse rate with hormonal therapy alone.She initiated Letrozole hormonal therapy in April 2013.  .    She had developed a bloody nipple discharge in the latter part of 2012 and a mammogram on December 31 showed asymmetric densities in the left breast at the 5:00 position extending posteriorly.  Ultrasound showed several solid masses extending into the lower outer quadrant.  On January 8 ultrasound-guided biopsy showed an infiltrating ductal carcinoma intermediate grade (3+2+1), 100%  ER-positive, 100%  OK positive, HER-2 1+.  On January 25 right and left mastectomy were performed which showed a 4.4 cm intraductal carcinoma grade 2 with positive deep margin involving skeletal muscle the pectoralis.  [place] lymph node showed multifocal clusters of tumor cells all less than 1 her soles, final pathological stage T2 N0 (IHC positive) stage II A. The right breast was unremarkable pathologically.

## 2022-04-06 NOTE — CARE UPDATE
Contacted by nursing staff that patient was coding at 4:40AM. Patient had been on Tavares and Vaso all night with CRRT. Noted to by in PMVT and CPR initiated. Given Epi x1, defibrillation x1, and 4 rounds of CPR before ROSC. Code last 4 mintues in total. After ROSC, patient in NCT with HR 150s and /110. Given Adenosine 6 mg x1 and converted to sinus tachycardia with . BP then dropped significantly requiring re-initiation of Vaso and Tavares to maintain MAP >65. Disconnected patient from CRRT for additional vascular access.     Labs obtained prior/during code: Hb 10, K 5.1, Mag 1.8 (repleted), Lactate 2, ABG 7.36/37/21/93.     Telemetry review: PVC-triggered PMVT (strips left in patient's physical chart)    Called to update son of patient's condition and explained poor prognosis. He was fully aware of patient's condition and no significant improvement over the past several days. He desired to not repeat aggressive measures if patient were to pass again. Code status changed to DNR and informed team. Will continue to medically manage only. Son unable to come in this morning, but appreciative of phone call.    Updated plan to nursing staff and message sent to Dr Gisela Lyles MD.     Min Whitehead MD PGY4  Cardiovascular Medicine Fellow  [facility]  Pager: [phone]

## 2022-04-06 NOTE — SUBJECTIVE & OBJECTIVE
Interval History: bekah had cardiac arrest overnight SLED was held. Patient is neg 1.8L in last 24 hours     Review of patient's allergies indicates:   Allergen Reactions    Cyclobenzaprine Hallucinations    Erythromycin      Stomach upset    Keflex [cephalexin]      Yeast infection    Erythromycin base      Other reaction(s): upset stomach     Current Facility-Administered Medications   Medication Frequency    0.9%  NaCl infusion (CRRT USE ONLY) Continuous    0.9%  NaCl infusion Continuous    acetaminophen tablet 650 mg Q8H PRN    amiodarone tablet 200 mg Daily    aspirin chewable tablet 81 mg Daily    atorvastatin tablet 80 mg Daily    chlorhexidine 0.12 % solution 15 mL BID    dexmedetomidine (PRECEDEX) 400mcg/100mL 0.9% NaCL infusion Continuous    dextrose 10% bolus 125 mL PRN    famotidine (PF) injection 20 mg Daily    fentaNYL 2500 mcg in 0.9% sodium chloride 250 mL infusion premix (titrating) Continuous    fentaNYL 50 mcg/mL injection 25 mcg Q15 Min PRN    gelatin adsorbable 12-7 mm top sponge sponge 1 applicator PRN    glucagon (human recombinant) injection 1 mg PRN    heparin (porcine) injection 1,000 Units PRN    heparin 25,000 units in dextrose 5% (100 units/ml) IV bolus from bag - ADDITIONAL PRN BOLUS - 30 units/kg PRN    heparin 25,000 units in dextrose 5% (100 units/ml) IV bolus from bag - ADDITIONAL PRN BOLUS - 60 units/kg PRN    heparin 25,000 units in dextrose 5% 250 mL (100 units/mL) infusion LOW INTENSITY nomogram - [facility] Continuous    insulin aspart U-100 pen 0-5 Units Q6H PRN    insulin detemir U-100 pen 2 Units BID    magnesium sulfate 2g in water 50mL IVPB (premix) PRN    metoprolol tartrate (LOPRESSOR) tablet 25 mg BID    miconazole NITRATE 2 % top powder BID    naloxone 0.4 mg/mL injection 0.02 mg PRN    ondansetron disintegrating tablet 8 mg Q8H PRN    phenylephrine (EDILBERTO-SYNEPHRINE) 100 mg in sodium chloride 0.9% 250 mL infusion Continuous    piperacillin-tazobactam 4.5 g in sodium  chloride 0.9% 100 mL IVPB (ready to mix system) Q8H    polyethylene glycol packet 17 g BID PRN    sodium chloride 0.9% bolus 250 mL PRN    sodium chloride 0.9% bolus 250 mL PRN    sodium chloride 0.9% bolus 250 mL PRN    sodium chloride 0.9% flush 10 mL PRN    sodium phosphate 20.01 mmol in dextrose 5 % 250 mL IVPB PRN    sodium phosphate 30 mmol in dextrose 5 % 250 mL IVPB PRN    sodium phosphate 39.99 mmol in dextrose 5 % 250 mL IVPB PRN    ticagrelor tablet 90 mg BID    vasopressin (PITRESSIN) 100 Units in dextrose 5 % 100 mL infusion Continuous       Objective:     Vital Signs (Most Recent):  Temp: 98.42 °F (36.9 °C) (04/06/22 0752)  Pulse: 65 (04/06/22 0752)  Resp: (!) 24 (04/06/22 0752)  BP: (!) 157/65 (04/05/22 1600)  SpO2: 100 % (04/06/22 0752)  O2 Device (Oxygen Therapy): ventilator (04/06/22 0713)   Vital Signs (24h Range):  Temp:  [94.1 °F (34.5 °C)-98.96 °F (37.2 °C)] 98.42 °F (36.9 °C)  Pulse:  [] 65  Resp:  [] 24  SpO2:  [86 %-100 %] 100 %  BP: ()/(59-65) 157/65  Arterial Line BP: ()/(10-90) 140/52     Weight: 95.2 kg (209 lb 14.1 oz) (04/04/22 0400)  Body mass index is 36.03 kg/m².  Body surface area is 2.07 meters squared.    I/O last 3 completed shifts:  In: 9097.1 [I.V.:8003.2; NG/GT:420; IV Piggyback:673.9]  Out: 01764 [Drains:152; Other:37593]    Physical Exam  Vitals reviewed.   Constitutional:       Appearance: She is obese. She is ill-appearing.      Comments: Sedated and intubated    Cardiovascular:      Rate and Rhythm: Normal rate and regular rhythm.      Heart sounds: Normal heart sounds.   Pulmonary:      Effort: Pulmonary effort is normal.      Breath sounds: Normal breath sounds.      Comments: Intubated   B/L air entry   Abdominal:      General: Bowel sounds are normal.      Palpations: Abdomen is soft.   Musculoskeletal:      Right lower leg: No edema.      Left lower leg: No edema.   Skin:     Coloration: Skin is not jaundiced.      Findings: No rash.    Neurological:      Comments: Sedated        Significant Labs:  CBC:   Recent Labs   Lab 04/06/22  0340 04/06/22  0456   WBC 5.88  --    RBC 3.79*  --    HGB 10.4*  --    HCT 34.3* 36     --    MCV 91  --    MCH 27.4  --    MCHC 30.3*  --        CMP:   Recent Labs   Lab 04/04/22  0311 04/04/22  1446 04/06/22  0340   *   < > 120*   CALCIUM 8.8   < > 9.1   ALBUMIN 3.1*   < > 3.0*   PROT 6.3  --   --    *   < > 139   K 4.0   < > 5.1   CO2 22*   < > 16*   CL 98   < > 103   BUN 7*   < > 5*   CREATININE 1.6*   < > 1.0   ALKPHOS 80  --   --    ALT 70*  --   --    AST 51*  --   --    BILITOT 1.0  --   --     < > = values in this interval not displayed.          Significant Imaging:  Reviewed

## 2022-04-06 NOTE — ASSESSMENT & PLAN NOTE
Patient has been on pressors since 4/1   Not consistent with Cardiogenic Shock  Broadly covered on antibiotics for possible sepsis    Hemodynamics 4/6/22:  CO 7.03  CI 3.39    CVP 8  SVO2 66  Tavares 0.5  Vaso 0.04

## 2022-04-06 NOTE — PROGRESS NOTES
[facility]  Cardiology  Progress Note    Patient Name: Kylie King  MRN: [id number]  Admission Date: 3/30/2022  Hospital Length of Stay: 7 days  Code Status: DNR   Attending Physician: Gisela Lyles MD   Primary Care Physician: Virginia Foote MD  Expected Discharge Date: 4/8/2022  Principal Problem:NSTEMI (non-ST elevated myocardial infarction)    Subjective:     Hospital Course:   She was subsequently admitted to ED for urgent HD however noted to have NSTEMI and admitted to hospital medicine. Trop notable for rise 4--> 25 with EKG showng IVCD/LBBB and TTE with worsening EF 40%--> 28% and RWMA in LAD and Lcx territory. Per my exam, patient asymptomatic however she was also asymptomatic with last NSTEMI presentation. She was loaded with ASA and plavix and on hep gtt however labwork notable for persistent hyperkalemia K 5.7 Hg 12 Plt 168. She underwent HD on 4/1 but still had persistent hypervolemia. Went for an angiogram on 4/1 due to NSTEMI. Troponin increased to 25. Before cath was started patient went into resp distress and intubated. Placed on CRRT 4/1. Hemodynamics are not consistent with cardiogenic shock. Levo is stable at low doses. She was started on empiric therapy on 4/1. Patient went into AF with RVR the night of 4/4. Pressors were changed to neosynephrine and vaso and she was bolused with 150J of amiodarone to which she converted back into NSR. Patient has a sedation holding on 4/5 for about 2 hours. Patient did not follow commands purposefully but did move all extremities spontaneous. Previous CTH a few days ago showed not acute intracranial abnormalities. The night of 4/5 patient had an episode of PMVT and was coded - ROSC was obtained. Family was called and patient was made DNR by her son. Will continue to support patient and attempt to give her more metabolic clearance with SLED.      Interval History: Episode of PMVT overnight and the patient wascoded - ROSC was  obtained. Family was called and patient was made DNR by her son. Will continue to support patient and attempt to give her more metabolic clearance with SLED.    Review of Systems   Unable to perform ROS: Intubated   Objective:     Vital Signs (Most Recent):  Temp: 98.24 °F (36.8 °C) (04/06/22 1023)  Pulse: (!) 59 (04/06/22 1023)  Resp: (!) 27 (04/06/22 1023)  BP: (!) 157/65 (04/05/22 1600)  SpO2: 100 % (04/06/22 1023)   Vital Signs (24h Range):  Temp:  [94.1 °F (34.5 °C)-98.96 °F (37.2 °C)] 98.24 °F (36.8 °C)  Pulse:  [] 59  Resp:  [] 27  SpO2:  [86 %-100 %] 100 %  BP: ()/(59-65) 157/65  Arterial Line BP: ()/(10-90) 140/52     Weight: 95.2 kg (209 lb 14.1 oz)  Body mass index is 36.03 kg/m².     SpO2: 100 %  O2 Device (Oxygen Therapy): ventilator      Intake/Output Summary (Last 24 hours) at 4/6/2022 1123  Last data filed at 4/6/2022 1100  Gross per 24 hour   Intake 4733.9 ml   Output 6320 ml   Net -1586.1 ml         Lines/Drains/Airways       Central Venous Catheter Line  Duration             Trialysis (Dialysis) Catheter 04/01/22 2036 right internal jugular 4 days              Drain  Duration                  Hemodialysis AV Fistula 02/17/22 Right forearm 48 days         NG/OG Tube 04/01/22 1451 Center mouth 4 days              Airway  Duration                  Airway - Non-Surgical 04/01/22 1444 Endotracheal Tube 4 days       Airway Anesthesia 04/01/22 4 days              Arterial Line  Duration             Arterial Line 04/01/22 1555 Left Radial 4 days              Peripheral Intravenous Line  Duration                  Peripheral IV - Single Lumen 03/31/22 2232 20 G Anterior;Left Forearm 5 days         Peripheral IV - Single Lumen 03/31/22 2232 20 G Anterior;Left;Proximal Upper Arm 5 days         Peripheral IV - Single Lumen 04/01/22 1500 20 G Left Antecubital 4 days         Peripheral IV - Single Lumen 04/05/22 1130 20 G Anterior;Left;Proximal Forearm <1 day                    Physical  Exam  Vitals and nursing note reviewed.   Constitutional:       Appearance: Normal appearance. She is obese. She is not ill-appearing.   HENT:      Head: Normocephalic and atraumatic.   Cardiovascular:      Rate and Rhythm: Normal rate and regular rhythm.      Pulses: Normal pulses.      Heart sounds: Normal heart sounds. No murmur heard.    No friction rub. No gallop.   Pulmonary:      Effort: Respiratory distress present.      Breath sounds: Rales present.      Comments: intubated  Abdominal:      General: Abdomen is flat. Bowel sounds are normal. There is no distension.      Palpations: Abdomen is soft.      Tenderness: There is no abdominal tenderness.   Musculoskeletal:      Right lower leg: Edema present.      Left lower leg: Edema present.   Skin:     General: Skin is warm.   Neurological:      Comments: Sedated       Significant Labs: CMP   Recent Labs   Lab 04/05/22  1225 04/05/22 2023 04/06/22  0340    136 139   K 4.7 5.3* 5.1    105 103   CO2 20* 17* 16*   * 144* 120*   BUN 6* 11 5*   CREATININE 1.7* 1.9* 1.0   CALCIUM 8.7 9.2 9.1   ALBUMIN 2.9* 3.1* 3.0*   ANIONGAP 16 14 20*   ESTGFRAFRICA 35.5* 31.0* >60.0   EGFRNONAA 30.8* 26.9* 58.4*     , CBC   Recent Labs   Lab 04/05/22  0422 04/06/22  0340 04/06/22  0456   WBC 6.31 5.88  --    HGB 10.8* 10.4*  --    HCT 36.2* 34.3* 36    160  --      and All pertinent lab results from the last 24 hours have been reviewed.    Significant Imaging:  All imaging reviewed in the last 24 hours    Assessment and Plan:     * Shock  Patient has been on pressors since 4/1   Not consistent with Cardiogenic Shock  Broadly covered on antibiotics for possible sepsis    Hemodynamics 4/6/22:  CO 7.03  CI 3.39    CVP 8  SVO2 66  Tavares 0.5  Vaso 0.04        Hypertensive emergency  PLAN:  -- Resolved now on pressors    ESRD (end stage renal disease)  Undergoing CRRT since 4/1    COPD without exacerbation  Patient is intubated    Type 2 diabetes  "mellitus with diabetic peripheral angiopathy and gangrene, with long-term current use of insulin     HbA1c:   Lab Results   Component Value Date    HGBA1C 5.9 (H) 11/03/2021    HGBA1C 7.8 10/09/2018     PLAN:   -- LDSSI + 2 U of detemir BID  -- Goal while inpatient 140-180  -- POC BG Q6H  -- RD for TF recs while intubated - will start trickle feeds 4/3 if still intubated      NSTEMI (non-ST elevated myocardial infarction)  Tests     LDL:   Lab Results   Component Value Date    LDLCALC 33.6 (L) 02/09/2021        BNP:   Lab Results   Component Value Date    BNP 4,554 (H) 03/30/2022        TSH:   Lab Results   Component Value Date    TSH 2.540 10/30/2021        HbA1c:   Lab Results   Component Value Date    HGBA1C 5.9 (H) 11/03/2021    HGBA1C 7.8 10/09/2018       MELISSA score: 5  Luana score: 117    Previous LHC 2/7/22:  · The Ost Cx to Prox Cx lesion was 80% stenosed.  · The 1st Mrg lesion was 60% stenosed.  · The 1st Diag lesion was 60% stenosed.  · The estimated blood loss was <50 mL.   She has LCX and Diag disease but the LCX is the most important lesion.  Because this is an ostial lesion, will do LAD/LCX "V" stenting.      LHC on 4/5/22:  1. There was inferior posterior dyskinesis consistent with recent MI and an LVEDP of 30 mmHg.  2. The left main bifurcation was widely patent.  3. The ostial LAD and ostial left circumflex stents were widely patent.  4. The first diagonal branch, which was large, had two lesion in the distal small vessel of 90%.  5. The first OMB had 90% stenosis in a small vessel as well.  6. The RCA was widely patent.  No collaterals were seen.      PLAN:  - attempted LHC with IC on 4/1 but batient became acutely hypertenive, tachycardiac and hypoxic requiring the LHC to be aborted.  Repeat LHC on 4/4 showed no vessels that needed emergenct PCI  - Maintain on telemetry and repeat EKG in the AM to look for any subtle changes  - Up to date risk stratification labs to be ordered and addressed " appropriately     --> TSH, Lipids, HbA1c    - ACS Core Measures     Heparin / Enoxaparin: heparin gtt     Aspirin + Brillinta     Beta-Blocker: on hold while on pressors     ACE/ARB: On hold      Statin: Atorvastatin         Atrial fibrillation  [id number] Scoring System Interpretation   (CHF, HTN, Age > 75 (2), DM II, Stroke, Vascular Disease Hx, Age > 65 (1), Sex Female)  [id number]-[id number] Score  Adjusted Stroke Risk   0    0 %   1    1.3 %   2    2.2 %   3    3.2 %    4    4.0 %   5    6.7 %   6    9.8 %   7    9.6 %   8    6.7 %    9    15.2 %    HAS-BLED Scoring System Interpretation   (Hypertension, Abnormal Liver and Kidney Function, Stroke, Bleeding Tendency, Labile INR, Elderly, Drugs (EtOH and ASA/NSAIDs)    HAS-BLED Score  Bleeds per 100 patient years   0           1.13   1           1.02   2           1.88   3           3.74   4           8.70   5-9                 Insufficient Data      PLAN:  - Maintain K > 4, Mag > 2 and Ca/iCal WNL to decrease arrhythmogenic potential  - Cycle troponin to rule out ischemic etiology  - Rhythm control on Amiodarone   - A/C control with heparin gtt    --> [id number]-HEBs score 4    - Maintain on telemetry and daily morning    --> Please perform EKG if the patient converts spontaneously     Anemia in ESRD (end-stage renal disease)  Management per nephro    Acute on chronic respiratory failure  Hypervolemia controlled with HD  Patient is currently intubated    End-stage renal disease on hemodialysis  Patient is ESRD on HD TTS has right upper ext forearm fistula. upgraded to ICU today   Last HD was done 3/31/22 with with 2L out   Patient has been neg 4 Liters since admission     She went to cath lab 4/1 for MI and stent thrombosis for recent stent that she had 3 weeks ago. Before cath was started patient went into resp distress and intubated. Patient is hypervolemic and hypertensive.       PLAN:  -- on SLED    Acute on chronic combined systolic and diastolic heart failure  ICM  NYHA Class III Acute on Chronic Systolic and diastolic HF Stage C: Structural heart disease with prior or current symptoms of HF.  - Most recent TTE demonstrates: LVef 28 %, Grade II DD - Mild AS Aortic valve area is 1.62 cm2; peak velocity is 1.46 m/s; mean gradient is 4 mmHg. CVP 15   - EKG : NSR with QTc 510  - Troponin 24.436  BNP 4,554     PLAN:  - Maintain on telemetry    - Up to date risk stratification : TSH, Lipids, HbA1c with optimization of risk factors is necessary  - Check Electrolytes, keep Mag >2 & K+ >4  - SCDs, TEDs, Nursing communication to elevated LE   - Ambulate as tolerated    - Optimal therapy at this time to include :    - Anti-platelet agent with ASA + ticagalor    - ACE/ARB hold at this time on pressors    - BB  hold at this time on pressors and up titrate as hemodynamics allow    - Statin with liptor 80 mg QD      - Follow up transitional care visit for heart failure at discharge    History of breast cancer  Per oncology - last seen By Dr. Mclain in 2015  carcinoma of the left breast T2N0 Stage IIA Strongly ER and TN positive, HER-2 negative.  Oncotype .Score returned at 6 indicating a low risk and a 5% relapse rate with hormonal therapy alone.She initiated Letrozole hormonal therapy in April 2013.  .    She had developed a bloody nipple discharge in the latter part of 2012 and a mammogram on December 31 showed asymmetric densities in the left breast at the 5:00 position extending posteriorly.  Ultrasound showed several solid masses extending into the lower outer quadrant.  On January 8 ultrasound-guided biopsy showed an infiltrating ductal carcinoma intermediate grade (3+2+1), 100%  ER-positive, 100%  TN positive, HER-2 1+.  On January 25 right and left mastectomy were performed which showed a 4.4 cm intraductal carcinoma grade 2 with positive deep margin involving skeletal muscle the pectoralis.  [place] lymph node showed multifocal clusters of tumor cells all less than 1 her soles, final  pathological stage T2 N0 (IHC positive) stage II A. The right breast was unremarkable pathologically.    Hyperlipidemia     LDL:   Lab Results   Component Value Date    LDLCALC 33.6 (L) 02/09/2021       PLAN:  -- Continue High intensity statin       VTE Risk Mitigation (From admission, onward)         Ordered     heparin 25,000 units in dextrose 5% (100 units/ml) IV bolus from bag - ADDITIONAL PRN BOLUS - 60 units/kg  As needed (PRN)        Question:  Heparin Infusion Adjustment (DO NOT MODIFY ANSWER)  Answer:  [email or web address]\epic\Images\Pharmacy\HeparinInfusions\heparin LOW INTENSITY nomogram for [facility] [id number].pdf    04/05/22 0421     heparin 25,000 units in dextrose 5% (100 units/ml) IV bolus from bag - ADDITIONAL PRN BOLUS - 30 units/kg  As needed (PRN)        Question:  Heparin Infusion Adjustment (DO NOT MODIFY ANSWER)  Answer:  [email or web address]\epic\Images\Pharmacy\HeparinInfusions\heparin LOW INTENSITY nomogram for [facility] [id number].pdf    04/05/22 0421     heparin 25,000 units in dextrose 5% 250 mL (100 units/mL) infusion LOW INTENSITY nomogram - [facility]  Continuous        Question Answer Comment   Heparin Infusion Adjustment (DO NOT MODIFY ANSWER) [email or web address]\epic\Images\Pharmacy\HeparinInfusions\heparin LOW INTENSITY nomogram for [facility] [id number].pdf    Begin at (in units/kg/hr) 12        04/05/22 0421     heparin (porcine) injection 1,000 Units  As needed (PRN)         04/01/22 1523     Reason for No Pharmacological VTE Prophylaxis  Once        Question:  Reasons:  Answer:  Already adequately anticoagulated on oral Anticoagulants    04/01/22 1525     IP VTE HIGH RISK PATIENT  Once         04/01/22 1525     Place sequential compression device  Until discontinued         03/30/22 1922                Lily Bourgeois DO  Cardiology  [facility] - [facility]

## 2022-04-06 NOTE — PLAN OF CARE
No acute events throughout day. See vital signs and assessments in flowsheets. See below for updates on today's progress.     Pulmonary: pt continues on mechanical vent as follows: AC/VC 30%/380/25/8+    Cardiovascular: A fib w PVC's. Weaning pressors as tolerated to maintain a MAP of 65. HR 50-60's. CVP 8, SVO2 64%.     Neurological: grimaces to painful stimuli, not following commands, moves extremities spontaneously.    Gastrointestinal: No BM this shift. Bowel sounds audible. Tube feeds restarted at 10cc/hr    Genitourinary: anuric. On CRRT SLED.     Endocrine: AC q6    Integumentary/Other: see flow sheets for more information     Infusions: heparin, sona, KVO, precedex    Patient progressing towards goals as tolerated, plan of care communicated and reviewed with Kylie King and family. All concerns addressed. Will continue to monitor.

## 2022-04-06 NOTE — PROGRESS NOTES
CRRT ended. Pt became instable. Primary team at bedside.    04/06/22 0448   Treatment   Treatment Type SLED   Treatment Status Blood returned   Dialysis Machine Number K22   Dialyzer Time (hours) 29.12   BVP (Liters) 102 L   Solutions Labeled and Current  Yes   Access Temporary Cath;Right;IJ   Catheter Dressing Intact  Yes   Alarms Engaged Yes   CRRT Comments Patient became unstable blood returned to patient. Primary team at bedside

## 2022-04-06 NOTE — EICU
Intervention Initiated From:  Bedside    Vonnie Communicated with Bedside Nurse regarding:  ECG Change    Comments: Called to the room as CPR was being initated by the bedside team.  Details documented in the code documentation flowsheet.

## 2022-04-06 NOTE — ASSESSMENT & PLAN NOTE
Patient is ESRD on HD TTS has right upper ext forearm fistula. upgraded to ICU today   Last HD was done 3/31/22 with with 2L out   Patient has been neg 4 Liters since admission     She went to cath lab 4/1 for MI and stent thrombosis for recent stent that she had 3 weeks ago. Before cath was started patient went into resp distress and intubated. Patient is hypervolemic and hypertensive.       PLAN:  -- on SLED

## 2022-04-06 NOTE — PROGRESS NOTES
Patient switched form SCUF to SLED.    04/05/22 2000   Treatment   Treatment Type SLED   Treatment Status Order change   Dialysis Machine Number K22   Dialyzer Time (hours) 20.34   BVP (Liters) 0 L   Solutions Labeled and Current  Yes   Access Temporary Cath;Right;IJ   Catheter Dressing Intact  Yes   Alarms Engaged Yes   CRRT Comments Switched form SCUF/SLED

## 2022-04-06 NOTE — PROGRESS NOTES
[facility] - [facility]  Nephrology  Progress Note    Patient Name: Kylie King  MRN: [id number]  Admission Date: 3/30/2022  Hospital Length of Stay: 7 days  Attending Provider: Gisela Lyles MD   Primary Care Physician: Virginia Foote MD  Principal Problem:NSTEMI (non-ST elevated myocardial infarction)    Subjective:     HPI: Kylie King is a 66 y.o. female who  has a past medical history of Breast cancer (s/p rad/chemo), History of Colon cancer, CAD s/p PCI on 02/08/2022 (on DAPT), Cataract, Choledocholithiasis, HFrEF (40%), COPD, Chronic venous insufficiency, Anxiety w Depression, ESRD (TThS), GERD, History of kidney stones, History of osteomyelitis of left foot, History of pancreatitis, History of stroke, Hyperlipidemia, Hypertension, Intracerebral hemorrhage, Lumbar degenerative disc disease, Lumbar spinal stenosis, Morbid obesity, Paroxysmal atrial fibrillation, Cerebral Vascular Hemorrhage, PAD (s/p left foot amputation on Plavix), Peripheral neuropathy, Pubic ramus fracture, Tobacco dependence, DM2, and Urinary incontinence, presented to the ED with after presenting to Cardiac clinica and noticed to be volume overloaded on exam and SOBOE. Patient only received 1.5 hours of dialysis this past run. On admit her K was 7.4 and and BNP >4500, therefore, urgent HD performed in ED. In addition her initial troponin was 3.5, with repeat nearly 5. Denies any CP throughout, but diabetic. ACS protocol was initiated. Cardiology was consulted. Of note, she is anuric and very sensitive to missed or shortened dialysis sessions and has had several recent hospitalizations from volume/ electrolytes issues. In regards to her ongoing diarrhea, unclear cause as she denies recent ABx use and recent C. Diff testing negative. Had recent C 2/8/22: V-stenting of the LAD and circumflex ostium was accomplished. Patient denies any fevers, night sweats, n/v/d/c, abd pain, dysuria, hematuria or hematochezia,  cough, wheezing, SOB, CP, leg swelling, HA, dizziness, weakness, hallucinations, SI, HI, or self injury at this time.      Nephrology was consulted for ESRD management       Interval History: bekah had cardiac arrest overnight SLED was held. Patient is neg 1.8L in last 24 hours     Review of patient's allergies indicates:   Allergen Reactions    Cyclobenzaprine Hallucinations    Erythromycin      Stomach upset    Keflex [cephalexin]      Yeast infection    Erythromycin base      Other reaction(s): upset stomach     Current Facility-Administered Medications   Medication Frequency    0.9%  NaCl infusion (CRRT USE ONLY) Continuous    0.9%  NaCl infusion Continuous    acetaminophen tablet 650 mg Q8H PRN    amiodarone tablet 200 mg Daily    aspirin chewable tablet 81 mg Daily    atorvastatin tablet 80 mg Daily    chlorhexidine 0.12 % solution 15 mL BID    dexmedetomidine (PRECEDEX) 400mcg/100mL 0.9% NaCL infusion Continuous    dextrose 10% bolus 125 mL PRN    famotidine (PF) injection 20 mg Daily    fentaNYL 2500 mcg in 0.9% sodium chloride 250 mL infusion premix (titrating) Continuous    fentaNYL 50 mcg/mL injection 25 mcg Q15 Min PRN    gelatin adsorbable 12-7 mm top sponge sponge 1 applicator PRN    glucagon (human recombinant) injection 1 mg PRN    heparin (porcine) injection 1,000 Units PRN    heparin 25,000 units in dextrose 5% (100 units/ml) IV bolus from bag - ADDITIONAL PRN BOLUS - 30 units/kg PRN    heparin 25,000 units in dextrose 5% (100 units/ml) IV bolus from bag - ADDITIONAL PRN BOLUS - 60 units/kg PRN    heparin 25,000 units in dextrose 5% 250 mL (100 units/mL) infusion LOW INTENSITY nomogram - [facility] Continuous    insulin aspart U-100 pen 0-5 Units Q6H PRN    insulin detemir U-100 pen 2 Units BID    magnesium sulfate 2g in water 50mL IVPB (premix) PRN    metoprolol tartrate (LOPRESSOR) tablet 25 mg BID    miconazole NITRATE 2 % top powder BID    naloxone 0.4 mg/mL injection  0.02 mg PRN    ondansetron disintegrating tablet 8 mg Q8H PRN    phenylephrine (EDILBERTO-SYNEPHRINE) 100 mg in sodium chloride 0.9% 250 mL infusion Continuous    piperacillin-tazobactam 4.5 g in sodium chloride 0.9% 100 mL IVPB (ready to mix system) Q8H    polyethylene glycol packet 17 g BID PRN    sodium chloride 0.9% bolus 250 mL PRN    sodium chloride 0.9% bolus 250 mL PRN    sodium chloride 0.9% bolus 250 mL PRN    sodium chloride 0.9% flush 10 mL PRN    sodium phosphate 20.01 mmol in dextrose 5 % 250 mL IVPB PRN    sodium phosphate 30 mmol in dextrose 5 % 250 mL IVPB PRN    sodium phosphate 39.99 mmol in dextrose 5 % 250 mL IVPB PRN    ticagrelor tablet 90 mg BID    vasopressin (PITRESSIN) 100 Units in dextrose 5 % 100 mL infusion Continuous       Objective:     Vital Signs (Most Recent):  Temp: 98.42 °F (36.9 °C) (04/06/22 0752)  Pulse: 65 (04/06/22 0752)  Resp: (!) 24 (04/06/22 0752)  BP: (!) 157/65 (04/05/22 1600)  SpO2: 100 % (04/06/22 0752)  O2 Device (Oxygen Therapy): ventilator (04/06/22 0713)   Vital Signs (24h Range):  Temp:  [94.1 °F (34.5 °C)-98.96 °F (37.2 °C)] 98.42 °F (36.9 °C)  Pulse:  [] 65  Resp:  [] 24  SpO2:  [86 %-100 %] 100 %  BP: ()/(59-65) 157/65  Arterial Line BP: ()/(10-90) 140/52     Weight: 95.2 kg (209 lb 14.1 oz) (04/04/22 0400)  Body mass index is 36.03 kg/m².  Body surface area is 2.07 meters squared.    I/O last 3 completed shifts:  In: 9097.1 [I.V.:8003.2; NG/GT:420; IV Piggyback:673.9]  Out: 10823 [Drains:152; Other:15917]    Physical Exam  Vitals reviewed.   Constitutional:       Appearance: She is obese. She is ill-appearing.      Comments: Sedated and intubated    Cardiovascular:      Rate and Rhythm: Normal rate and regular rhythm.      Heart sounds: Normal heart sounds.   Pulmonary:      Effort: Pulmonary effort is normal.      Breath sounds: Normal breath sounds.      Comments: Intubated   B/L air entry   Abdominal:      General: Bowel  sounds are normal.      Palpations: Abdomen is soft.   Musculoskeletal:      Right lower leg: No edema.      Left lower leg: No edema.   Skin:     Coloration: Skin is not jaundiced.      Findings: No rash.   Neurological:      Comments: Sedated        Significant Labs:  CBC:   Recent Labs   Lab 04/06/22  0340 04/06/22  0456   WBC 5.88  --    RBC 3.79*  --    HGB 10.4*  --    HCT 34.3* 36     --    MCV 91  --    MCH 27.4  --    MCHC 30.3*  --        CMP:   Recent Labs   Lab 04/04/22  0311 04/04/22  1446 04/06/22  0340   *   < > 120*   CALCIUM 8.8   < > 9.1   ALBUMIN 3.1*   < > 3.0*   PROT 6.3  --   --    *   < > 139   K 4.0   < > 5.1   CO2 22*   < > 16*   CL 98   < > 103   BUN 7*   < > 5*   CREATININE 1.6*   < > 1.0   ALKPHOS 80  --   --    ALT 70*  --   --    AST 51*  --   --    BILITOT 1.0  --   --     < > = values in this interval not displayed.          Significant Imaging:  Reviewed     Assessment/Plan:     * NSTEMI (non-ST elevated myocardial infarction)  Per primary     Acute on chronic respiratory failure  Per primary     End-stage renal disease on hemodialysis  Patient is ESRD on HD TTS has right upper ext forearm fistula.     She went to cath lab Friday 4/1 for MI and stent thrombosis for recent stent that she had 3 weeks ago. Before cath was started patient went into resp distress and intubated. Patient was hypervolemic and hypertensive.     Plan:    patieent had cardiac arrest overnight SLED was held. Patient is neg 1.8L in last 24 hours    has metabolic acidosis. K 5.1 will start SLED and to keep patient net neg             Ken Bond MD  Nephrology  [facility] - [facility]

## 2022-04-06 NOTE — ASSESSMENT & PLAN NOTE
[id number] Scoring System Interpretation   (CHF, HTN, Age > 75 (2), DM II, Stroke, Vascular Disease Hx, Age > 65 (1), Sex Female)  [id number] Score  Adjusted Stroke Risk   0    0 %   1    1.3 %   2    2.2 %   3    3.2 %    4    4.0 %   5    6.7 %   6    9.8 %   7    9.6 %   8    6.7 %    9    15.2 %    HAS-BLED Scoring System Interpretation   (Hypertension, Abnormal Liver and Kidney Function, Stroke, Bleeding Tendency, Labile INR, Elderly, Drugs (EtOH and ASA/NSAIDs)    HAS-BLED Score  Bleeds per 100 patient years   0           1.13   1           1.02   2           1.88   3           3.74   4           8.70   5-9                 Insufficient Data      PLAN:  - Maintain K > 4, Mag > 2 and Ca/iCal WNL to decrease arrhythmogenic potential  - Cycle troponin to rule out ischemic etiology  - Rhythm control on Amiodarone   - A/C control with heparin gtt    --> [id number] score 4    - Maintain on telemetry and daily morning    --> Please perform EKG if the patient converts spontaneously

## 2022-04-07 LAB
BACTERIA BLD CULT: NORMAL
BACTERIA BLD CULT: NORMAL

## 2022-04-07 NOTE — DISCHARGE SUMMARY
[facility] - Cardiac Intensive Care  Cardiology  Discharge Summary      Patient Name: Kylie King  MRN: [id number]  Admission Date: 3/30/2022  Hospital Length of Stay: 7 days  Discharge Date and Time:  04/07/2022 7:18 AM  Attending Physician: Dora att. providers found    Discharging Provider: Bertrand Camargo MD  Primary Care Physician: Virginia Foote MD    Procedure(s) (LRB):  Left heart cath (Left)     Indwelling Lines/Drains at time of discharge:  Lines/Drains/Airways     Central Venous Catheter Line  Duration           Trialysis (Dialysis) Catheter 04/01/22 2036 right internal jugular 5 days          Drain  Duration                Hemodialysis AV Fistula 02/17/22 Right forearm 49 days         NG/OG Tube 04/01/22 1451 Center mouth 5 days          Airway  Duration                Airway - Non-Surgical 04/01/22 1444 Endotracheal Tube 5 days       Airway Anesthesia 04/01/22 5 days          Arterial Line  Duration           Arterial Line 04/01/22 1555 Left Radial 5 days                Hospital Course:  Kylie King is a 66 y.o. female who  has a past medical history of Breast cancer (s/p rad/chemo), History of Colon cancer, CAD s/p PCI on 02/08/2022 (on DAPT), Cataract, Choledocholithiasis, HFrEF (40%), COPD, Chronic venous insufficiency, Anxiety w Depression, ESRD (TThS), GERD, History of kidney stones, History of osteomyelitis of left foot, History of pancreatitis, History of stroke, Hyperlipidemia, Hypertension, Intracerebral hemorrhage, Lumbar degenerative disc disease, Lumbar spinal stenosis, Morbid obesity, Paroxysmal atrial fibrillation, Cerebral Vascular Hemorrhage, PAD (s/p left foot amputation on Plavix), Peripheral neuropathy, Pubic ramus fracture, Tobacco dependence, DM2, and Urinary incontinence, presented to the ED with after presenting to Cardiac clinic and noticed to be volume overloaded on exam .     She was subsequently admitted to ED for urgent HD however noted to have NSTEMI and admitted to  hospital medicine. Trop notable for rise 4--> 25 with EKG showng IVCD/LBBB and TTE with worsening EF 40%--> 28% and RWMA in LAD and Lcx territory. She was loaded with ASA and plavix and on hep gtt however labwork notable for persistent hyperkalemia K 5.7 Hg 12 Plt 168. She underwent HD on  but still had persistent hypervolemia. Went for an angiogram on  due to NSTEMI. Troponin increased to 25. Before cath patient went into resp distress and intubated. Placed on CRRT . Hemodynamics were not consistent with cardiogenic shock. Patient went into AF with RVR the night of . Pressors were changed to neosynephrine and vaso and she was bolused with amiodarone to which she converted back into NSR.     The night of  patient had an episode of PMVT and was coded - ROSC obtained. Family was called and patient was made DNR by her son. On  around 7PM pt noted to go into VT/VF. Shortly afterwards pt was in PEA.     Exam:  Patient is unresponsive to verbal and tactile stimuli  No breath sounds are heart  No heart sounds are heard  Patient is absent pulses in the carotid and femoral areas  Pupils fixed without response to bright light  Absent corneal reflex  Absent gag reflex     I pronounced the patient  at 7:04 PM. Cause of death was VT/VF, shock, CAD.       Goals of Care Treatment Preferences:  Code Status: DNR      Consults:   Consults (From admission, onward)        Status Ordering Provider     Inpatient consult to Registered Dietitian/Nutritionist  Once        Provider:  (Not yet assigned)    Completed MALLORY COLEMAN     Inpatient consult to Interventional Cardiology  Once        Provider:  (Not yet assigned)    Completed ANTHONY RICHARDSON     Inpatient consult to Interventional Cardiology  Once        Provider:  (Not yet assigned)    Completed JULIENNE RIVERA     Inpatient consult to Cardiology  Once        Provider:  (Not yet assigned)    Completed AVILA ELIZONDO     Inpatient consult to  Nephrology  Once        Provider:  (Not yet assigned)    AVILA Hammond          Significant Diagnostic Studies: Labs:   BMP:   Recent Labs   Lab 04/05/22 2023 04/06/22  0340 04/06/22  1027 04/06/22  1411   * 120*  --  128*    139  --  140   K 5.3* 5.1  --  4.9    103  --  102   CO2 17* 16*  --  22*   BUN 11 5*  --  6*   CREATININE 1.9* 1.0  --  1.0   CALCIUM 9.2 9.1  --  9.0   MG 2.0 1.8 2.4 2.1    and CBC   Recent Labs   Lab 04/06/22 0340 04/06/22  0456   WBC 5.88  --    HGB 10.4*  --    HCT 34.3* 36     --        Pending Diagnostic Studies:     Procedure Component Value Units Date/Time    APTT [[id number]] Collected: 04/01/22 0833    Order Status: Sent Lab Status: In process Updated: 04/01/22 0833    Specimen: Blood           Final Active Diagnoses:    Diagnosis Date Noted POA    PRINCIPAL PROBLEM:  Shock [R57.9] 04/06/2022 Yes    Hypertensive emergency [I16.1] 04/01/2022 Yes    ESRD (end stage renal disease) [N18.6] 03/31/2022 Yes    Prolonged Q-T interval on ECG [R94.31] 03/30/2022 Yes    COPD without exacerbation [J44.9] 03/30/2022 Yes     Chronic    NSTEMI (non-ST elevated myocardial infarction) [I21.4] 02/05/2022 Yes    Type 2 diabetes mellitus with diabetic peripheral angiopathy and gangrene, with long-term current use of insulin [E11.52, Z79.4] 12/29/2021 Not Applicable     Chronic    Atrial fibrillation [I48.91]  Yes     Chronic    Anemia in ESRD (end-stage renal disease) [N18.6, D63.1] 08/02/2021 Yes     Chronic    Chronic respiratory failure with hypoxia, on home oxygen therapy [J96.11, Z99.81]  Not Applicable     Chronic    Acute on chronic respiratory failure [J96.20] 01/29/2021 Unknown    End-stage renal disease on hemodialysis [N18.6, Z99.2]  Not Applicable     Chronic    Acute on chronic combined systolic and diastolic heart failure [I50.43] 04/23/2018 Yes    History of breast cancer [Z85.3] 03/21/2013 Not Applicable     Chronic    Hyperlipidemia  [E78.5] 2012 Yes     Chronic      Problems Resolved During this Admission:    Diagnosis Date Noted Date Resolved POA    History of non-ST elevation myocardial infarction (NSTEMI) [I25.2] 2022 Not Applicable    Hyperkalemia [E87.5] 02/15/2021 2022 Yes    History of colon cancer [Z85.038] 2018 Yes     Chronic     No new Assessment & Plan notes have been filed under this hospital service since the last note was generated.  Service: Cardiology      Discharged Condition:       Time spent on the discharge of patient: 60 minutes    Bertrand Camargo MD  Cardiology  [facility] - [facility]

## 2022-04-07 NOTE — PLAN OF CARE
[facility] - Cardiac Intensive Care  Discharge Final Note    Primary Care Provider: Virginia Foote MD    Expected Discharge Date: 2022    Final Discharge Note (most recent)     Final Note - 22 0846        Final Note    Assessment Type Final Discharge Note     Anticipated Discharge Disposition                Jenae Ochoa LMSW  [facility]  [id number]

## 2022-04-07 NOTE — SIGNIFICANT EVENT
2022  7:07 PM    Was paged due to patient being in VT/VF. Rhythm on tele monitoring went from VT/VF to PEA. Patient is DNR.       Exam:  Patient is unresponsive to verbal and tactile stimuli  No breath sounds are heart  No heart sounds are heard  Patient is absent pulses in the carotid and femoral areas  Pupils fixed without response to bright light  Absent corneal reflex  Absent gag reflex    I pronounced the patient  at 7:04 PM. Cause of death was VT, and shock    LEERS: [email or web address]      Bertrand Camargo   Department of Cardiovascular Disease, PGY-4   [facility]

## 2022-04-07 NOTE — CARE UPDATE
Patient noted to be in PMVT, MD Camargo made aware, sona increased to max dose, and family called and notified of change in status. Patient is confirmed DNR.  present at bedside.

## 2022-04-08 LAB
BACTERIA SPEC AEROBE CULT: ABNORMAL
GRAM STN SPEC: ABNORMAL
GRAM STN SPEC: ABNORMAL

## 2022-06-02 NOTE — Clinical Note
An airway assessment has been completed by delia.   [facility] Hospitalist [facility] Gastroenterology Internal Medicine Internal Medicine Internal Medicine No

## 2023-07-03 NOTE — TELEPHONE ENCOUNTER
Patient wanted to know if you was going to call in the pain medication for her, medication for an abscess she had on her breast and how long she should take the iron medication   [As Noted in HPI] : as noted in HPI [Negative] : Heme/Lymph

## 2023-09-05 NOTE — PLAN OF CARE
Cardiac ICU Care Plan    Pt Afib RVR in 170s, stabilized. CRRT with . Will continue to monitor. See below and flowsheets for full assessment and VS info.       Neuro:  [place] Coma Scale  Best Eye Response: 3-->(E3) to speech  Best Motor Response: 4-->(M4) withdraws from pain  Best Verbal Response: 1-->(V1) none  [place]ana Coma Scale Score: 8  Assessment Qualifiers: patient chemically sedated or paralyzed  Pupil PERRLA: other (see comments)    24 hr Temp:  [96.7 °F (35.9 °C)-98.6 °F (37 °C)]      CV:  Rhythm: sinus bradycardia   DVT prophylaxis: VTE Required Core Measure: Pharmacological prophylaxis initiated/maintained    CVP (mean): 9 mmHg (04/04/22 0701)       SVO2 (%): 52 % (04/03/22 0701)               Pulses  Right Radial Pulse: 2+ (normal)  Left Radial Pulse: 2+ (normal)  Right Dorsalis Pedis Pulse: Doppler  Left Dorsalis Pedis Pulse: unable to assess  Right Posterior Tibial Pulse: Doppler  Left Posterior Tibial Pulse: Doppler    Resp:  O2 Device (Oxygen Therapy): ventilator  Flow (L/min): 2  Vent Mode: A/C  Set Rate: 25 BPM  Oxygen Concentration (%): 32  Vt Set: 350 mL  PEEP/CPAP: 8 cmH20    GI/:  GI prophylaxis: yes  Diet/Nutrition Received: tube feeding  Last Bowel Movement: 04/05/22  Voiding Characteristics: anuria   Intake/Output Summary (Last 24 hours) at 4/5/2022 1759  Last data filed at 4/5/2022 1700  Gross per 24 hour   Intake 6125.46 ml   Output 8630 ml   Net -2504.54 ml     Treatment Type: Slow continuous ultrafiltration  UF Rate: 400 cc/hr    Labs/Accuchecks:  Recent Labs   Lab 04/03/22  0340 04/04/22  0311 04/05/22  0422   WBC 5.80 5.41 6.31   RBC 3.67* 3.69* 4.03   HGB 9.9* 10.2* 10.8*   HCT 32.8* 33.9* 36.2*   * 123* 176      Recent Labs   Lab 03/30/22  1952 03/31/22  0922 04/04/22  0622 04/05/22  0457 04/05/22  1225   INR 1.1  --   --  1.1  --    APTT 27.6   < > 41.6* 25.4 33.2*    < > = values in this interval not displayed.      Recent Labs     04/04/22  0311 04/04/22  1446  0.5 %    Neutrophils Absolute 3.1 1.8 - 8.0 K/UL    Lymphocytes Absolute 0.8 0.8 - 3.5 K/UL    Monocytes Absolute 0.5 0.0 - 1.0 K/UL    Eosinophils Absolute 0.1 0.0 - 0.4 K/UL    Basophils Absolute 0.0 0.0 - 0.1 K/UL    Absolute Immature Granulocyte 0.0 0.00 - 0.04 K/UL    Differential Type AUTOMATED     CMP    Collection Time: 09/04/23  6:45 PM   Result Value Ref Range    Sodium 137 136 - 145 mmol/L    Potassium Hemolyzed, Recollection Recommended 3.5 - 5.1 mmol/L    Chloride 111 (H) 97 - 108 mmol/L    CO2 24 21 - 32 mmol/L    Anion Gap 2 (L) 5 - 15 mmol/L    Glucose 84 65 - 100 mg/dL    BUN 6 6 - 20 mg/dL    Creatinine 0.79 0.55 - 1.02 mg/dL    Bun/Cre Ratio 8 (L) 12 - 20      Est, Glom Filt Rate >60 >60 ml/min/1.73m2    Calcium 9.1 8.5 - 10.1 mg/dL    Total Bilirubin 0.2 0.2 - 1.0 mg/dL    AST Hemolyzed, Recollection Recommended 15 - 37 U/L    ALT Hemolyzed, Recollection Recommended 12 - 78 U/L    Alk Phosphatase 75 45 - 117 U/L    Total Protein 8.2 6.4 - 8.2 g/dL    Albumin 3.4 (L) 3.5 - 5.0 g/dL    Globulin 4.8 (H) 2.0 - 4.0 g/dL    Albumin/Globulin Ratio 0.7 (L) 1.1 - 2.2     Comprehensive Metabolic Panel    Collection Time: 09/04/23  8:08 PM   Result Value Ref Range    Sodium 144 136 - 145 mmol/L    Potassium 4.0 3.5 - 5.1 mmol/L    Chloride 116 (H) 97 - 108 mmol/L    CO2 24 21 - 32 mmol/L    Anion Gap 4 (L) 5 - 15 mmol/L    Glucose 89 65 - 100 mg/dL    BUN 6 6 - 20 mg/dL    Creatinine 0.66 0.55 - 1.02 mg/dL    Bun/Cre Ratio 9 (L) 12 - 20      Est, Glom Filt Rate >60 >60 ml/min/1.73m2    Calcium 8.5 8.5 - 10.1 mg/dL    Total Bilirubin 0.2 0.2 - 1.0 mg/dL    AST 10 (L) 15 - 37 U/L    ALT 15 12 - 78 U/L    Alk Phosphatase 66 45 - 117 U/L    Total Protein 6.9 6.4 - 8.2 g/dL    Albumin 3.0 (L) 3.5 - 5.0 g/dL    Globulin 3.9 2.0 - 4.0 g/dL    Albumin/Globulin Ratio 0.8 (L) 1.1 - 2.2         Radiologic Studies  CT ABDOMEN PELVIS W IV CONTRAST Additional Contrast? None   Final Result   1.  Surgical changes following 04/05/22  1225   *   < > 136   K 4.0   < > 4.7   CO2 22*   < > 20*   CL 98   < > 100   BUN 7*   < > 6*   CREATININE 1.6*   < > 1.7*   ALKPHOS 80  --   --    ALT 70*  --   --    AST 51*  --   --    BILITOT 1.0  --   --     < > = values in this interval not displayed.       Recent Labs   Lab 03/30/22 1952 03/31/22  0922 04/03/22  1034   TROPONINI 4.811* 24.436* 12.036*      Recent Labs     04/03/22  0817 04/03/22  1551 04/04/22  1212   PH 7.376 7.384 7.266*   PCO2 50.6* 48.4* 51.2*   PO2 152* 84 65*   HCO3 29.7* 28.9* 23.3*   POCSATURATED 99 96 89*   BE 5 4 -4       Electrolytes: No replacement orders  Accuchecks: Q6H    Gtts/LDAs:   sodium chloride 0.9% 200 mL/hr at 04/05/22 1700    sodium chloride 0.9% Stopped (04/05/22 1653)    dexmedetomidine (PRECEDEX) infusion 0.6 mcg/kg/hr (04/05/22 1712)    fentanyl Stopped (04/05/22 1552)    heparin (porcine) in D5W 14 Units/kg/hr (04/05/22 1700)    phenylephrine 0.1 mcg/kg/min (04/05/22 1700)    vasopressin 0.04 Units/min (04/05/22 1700)       Lines/Drains/Airways       Central Venous Catheter Line  Duration             Trialysis (Dialysis) Catheter 04/01/22 2036 right internal jugular 3 days              Drain  Duration                  Hemodialysis AV Fistula 02/17/22 Right forearm 47 days         NG/OG Tube 04/01/22 1451 Center mouth 4 days              Airway  Duration                  Airway - Non-Surgical 04/01/22 1444 Endotracheal Tube 4 days       Airway Anesthesia 04/01/22 4 days              Arterial Line  Duration             Arterial Line 04/01/22 1555 Left Radial 4 days              Peripheral Intravenous Line  Duration                  Peripheral IV - Single Lumen 03/31/22 2232 20 G Anterior;Left Forearm 4 days         Peripheral IV - Single Lumen 03/31/22 2232 20 G Anterior;Left;Proximal Upper Arm 4 days         Peripheral IV - Single Lumen 04/01/22 1500 20 G Left Antecubital 4 days         Peripheral IV - Single Lumen 04/05/22 1130 20 G  Anterior;Left;Proximal Forearm <1 day                    Skin/Wounds  Bathing/Skin Care: bath, complete (04/05/22 0701)  Wounds: Yes  Wound care consulted: Yes

## 2023-11-08 NOTE — SUBJECTIVE & OBJECTIVE
11/08/23 1100   Vital Signs   Patient Position Supine   Orthostatic B/P and Pulse?  Yes   Blood Pressure Lying 126/73   Pulse Lying 63 PER MINUTE   Blood Pressure Sitting 124/84   Pulse Sitting 69 PER MINUTE   Blood Pressure Standing 114/83   Pulse Standing 83 PER MINUTE Past Medical History:   Diagnosis Date    Anxiety     Breast cancer     left    Carotid artery disease     Cataract     Choledocholithiasis     s/p ERCP and stent placement    Chronic diastolic CHF (congestive heart failure)     Chronic obstructive pulmonary disease     Chronic venous insufficiency     Depression     End-stage renal disease on hemodialysis     M/W/F    GERD (gastroesophageal reflux disease)     History of breast cancer     History of colon cancer 2001    s/p sigmoid colectomy    History of kidney stones     History of osteomyelitis of left foot     History of pancreatitis     History of stroke     Hyperlipidemia     Hypertension     Intracerebral hemorrhage     Lumbar degenerative disc disease     Lumbar spinal stenosis     Morbid obesity     Paroxysmal atrial fibrillation     Peripheral artery disease     Peripheral neuropathy     Tobacco dependence     Type II diabetes mellitus     Urinary incontinence        Past Surgical History:   Procedure Laterality Date    ANGIOGRAM, CORONARY, WITH LEFT HEART CATHETERIZATION N/A 2/7/2022    Procedure: Angiogram, Coronary, with Left Heart Cath;  Surgeon: Adam Rutherford MD;  Location: [facility] CATH LAB;  Service: Cardiology;  Laterality: N/A;    ANGIOGRAPHY OF LOWER EXTREMITY Right 9/17/2020    Procedure: Angiogram Extremity Unilateral;  Surgeon: RICK Pollard III, MD;  Location: [facility] OR [address] [place] [address];  Service: Peripheral Vascular;  Laterality: Right;  6.2 minutes of fluro  690.88 mGy  112.64 Gycm2  55 ml    ANGIOGRAPHY OF LOWER EXTREMITY Left 5/13/2021    Procedure: Angiogram Extremity Unilateral;  Surgeon: HOMERO Hayden II, MD;  Location: [facility] OR [address] [place] [address];  Service: Vascular;  Laterality: Left;  35.1 min  971.41 mGy  190.27 Gy.cm  101ml Dye    ANGIOGRAPHY OF LOWER EXTREMITY Right 7/19/2021    Procedure: Angiogram Extremity Unilateral;  Surgeon: HOMERO Hayden II, MD;  Location: [address];  Service: Vascular;  Laterality:  Right;  3.0 min  121.00 mGy  26.8713 Gy.cm  13ml Dye    AORTOGRAPHY N/A 5/13/2021    Procedure: AORTOGRAM;  Surgeon: HOMERO Hayden II, MD;  [address];  Service: Vascular;  Laterality: N/A;    AV FISTULA PLACEMENT Right 5/28/2018    Procedure: CREATION -FISTULA-AV;  Surgeon: RICK Pollard III, MD;  Location: [address];  Service: Peripheral Vascular;  Laterality: Right;    BREAST BIOPSY  1/2012    left breast invasive mammary carcinoma (lateral bx) and intraductal papilloma (medial bx)    CARDIOVERSION  11/22/2021    CARDIOVERSION  11/22/2021    Procedure: Cardioversion;  Surgeon: Ad Garcia MD;  Location: [facility] CATH LAB;  Service: Cardiology;;    CATARACT EXTRACTION W/  INTRAOCULAR LENS IMPLANT Right 1/24/2019        CATARACT EXTRACTION W/  INTRAOCULAR LENS IMPLANT Left 1/31/2019    Procedure: EXTRACTION, CATARACT, WITH IOL INSERTION;  Surgeon: Immanuel Moncada MD;  [facility];  Service: Ophthalmology;  Laterality: Left;    CHOLECYSTECTOMY  2001    CORONARY ANGIOGRAPHY N/A 2/8/2022    Procedure: ANGIOGRAM, CORONARY ARTERY;  Surgeon: Abelardo Betancur MD;  Location: [facility] CATH LAB;  Service: Cardiology;  Laterality: N/A;    DEBRIDEMENT OF FOOT Right 2/17/2021    Procedure: DEBRIDEMENT, FOOT right plantar ulcer;  Surgeon: Sonja Corral DPM;  Location: [facility];  Service: Podiatry;  Laterality: Right;    ERCP W/ SPHICTEROTOMY      FINE NEEDLE ASPIRATION  1999    Right breast    FOOT AMPUTATION Left 6/1/2021    Procedure: Transmetatarsal amputation;  Surgeon: Billy Robles DPM;  Location: [address];  Service: Podiatry;  Laterality: Left;    FOOT AMPUTATION Left 7/23/2021    Procedure: AMPUTATION, FOOT;  Surgeon: Billy Robles DPM;  [address];  Service: Podiatry;  Laterality: Left;    FOOT AMPUTATION THROUGH METATARSAL Right 10/15/2020    Procedure: PARTIAL RAY AMPUTATION GREAT TOE;  Surgeon: Billy Robles DPM;  Location: [address]  [facility];  Service: Podiatry;  Laterality: Right;  Stretcher OK    HERNIA REPAIR      LAPAROSCOPIC NEPHRECTOMY Left     MASTECTOMY  2013    left    OOPHORECTOMY Left     REVISION OF ARTERIOVENOUS FISTULA Right 8/15/2018    Procedure: REVISION, AV FISTULA;  Surgeon: RICK Pollard III, MD;  [address];  Service: Peripheral Vascular;  Laterality: Right;    SIGMOIDECTOMY      SURGICAL REMOVAL OF METATARSAL HEAD Right 2021    Procedure: OSTECTOMY, METATARSAL BONE, diatal 1st;  Surgeon: Sonja Corral DPM;  Location: [facility];  Service: Podiatry;  Laterality: Right;    TOE AMPUTATION Left 2021    Procedure: AMPUTATION, TOE;  Surgeon: HOMERO Hayden II, MD;  Location: [facility];  Service: Vascular;  Laterality: Left;    TOTAL REDUCTION MAMMOPLASTY Right        Review of patient's allergies indicates:   Allergen Reactions    Cyclobenzaprine Hallucinations    Erythromycin      Stomach upset    Keflex [cephalexin]      Yeast infection    Erythromycin base      Other reaction(s): upset stomach       Family History     Problem Relation (Age of Onset)    Arthritis Mother    Breast cancer Maternal Grandmother    Cancer Maternal Grandmother, Maternal Aunt    Celiac disease Sister    Diabetes Father    Heart disease Mother, Father, Maternal Grandmother        Tobacco Use    Smoking status: Current Some Day Smoker     Packs/day: 0.50     Years: 28.00     Pack years: 14.00     Types: Cigarettes     Start date: 1990     Last attempt to quit: 2018     Years since quittin.1    Smokeless tobacco: Never Used    Tobacco comment: anxious   Substance and Sexual Activity    Alcohol use: No    Drug use: No    Sexual activity: Not Currently     Partners: Male      Review of Systems   Unable to perform ROS: Acuity of condition     Objective:     Vital Signs (Most Recent):  Temp: 98 °F (36.7 °C) (22 1700)  Pulse: 77 (22 1740)  Resp: 20 (22 1700)  BP: (!)  177/76 (02/17/22 1740)  SpO2: (!) 92 % (02/17/22 1740) Vital Signs (24h Range):  Temp:  [97.6 °F (36.4 °C)-98 °F (36.7 °C)] 98 °F (36.7 °C)  Pulse:  [72-77] 77  Resp:  [20-25] 20  SpO2:  [92 %-100 %] 92 %  BP: (164-196)/(73-83) 177/76   Weight: 102.5 kg (226 lb)  Body mass index is 38.79 kg/m².    No intake or output data in the 24 hours ending 02/17/22 1742    Physical Exam  Vitals and nursing note reviewed.   Constitutional:       General: She is not in acute distress.     Appearance: She is obese. She is ill-appearing. She is not diaphoretic.      Comments: Somnolent but arousable with tactile stimuli   HENT:      Head: Normocephalic and atraumatic.      Nose: Nose normal.   Eyes:      General: No scleral icterus.  Cardiovascular:      Rate and Rhythm: Normal rate and regular rhythm.      Heart sounds: No murmur heard.      Pulmonary:      Effort: No respiratory distress.      Breath sounds: No wheezing.      Comments: Lung sounds difficult to characterize given habitus  Abdominal:      General: There is distension.      Palpations: Abdomen is soft.      Tenderness: There is no abdominal tenderness.   Musculoskeletal:         General: Deformity (Left foot amputation at midfoot, right big toe amputation) present. No signs of injury.      Right lower leg: No edema.      Left lower leg: No edema.      Comments: Area of upper medial breast that was hard, abnormal contour of breast, and scarring present   Skin:     Capillary Refill: Capillary refill takes less than 2 seconds.      Coloration: Skin is not jaundiced.      Findings: Bruising and erythema (Lower extremities brawny bilaterally to mid lower leg) present.   Neurological:      Mental Status: She is disoriented.      Comments: Somnolent, arousable with tactile stimulus and was able to state name, but unsure where she was or when her last dialysis appointment was         Vents:     Lines/Drains/Airways     Drain                 Hemodialysis AV Fistula Right  forearm -- days         Hemodialysis AV Fistula 05/28/18 1436  1361 days          Airway                 Airway - Non-Surgical 11/22/21 1308 Nasal Cannula 87 days          Peripheral Intravenous Line                 Peripheral IV - Single Lumen 02/17/22 1526 20 G Left Antecubital <1 day              Significant Labs:    CBC/Anemia Profile:  Recent Labs   Lab 02/17/22  1503 02/17/22  1510 02/17/22  1555   WBC 8.74  --   --    HGB 9.5*  --   --    HCT 32.2* 29* 30*     --   --    MCV 94  --   --    RDW 20.4*  --   --         Chemistries:  Recent Labs   Lab 02/17/22  1503      K 7.0*   CL 97   CO2 19*   *   CREATININE 13.8*   CALCIUM 9.1   ALBUMIN 3.4*   PROT 7.0   BILITOT 0.8   ALKPHOS 89   ALT 8*   AST 14       ABGs:   Recent Labs   Lab 02/17/22  1633   PH 7.303*   PCO2 49.1*   HCO3 24.3   POCSATURATED 41*   BE -2     Troponin:   Recent Labs   Lab 02/17/22  1503   TROPONINI 0.401*       Significant Imaging: I have reviewed all pertinent imaging results/findings within the past 24 hours.

## (undated) DEVICE — BLADE SURG CARBON STEEL #10

## (undated) DEVICE — COVER LIGHT HANDLE 80/CA

## (undated) DEVICE — KIT INTRO MICRO NIT VSI 4FR

## (undated) DEVICE — DRESSING GAUZE 6PLY 4X4

## (undated) DEVICE — SET MICROPUNCTURE

## (undated) DEVICE — DRESSING TELFA PAD N ADH 2X3

## (undated) DEVICE — SEE MEDLINE ITEM 156894

## (undated) DEVICE — NDL HYPO REG 25G X 1 1/2

## (undated) DEVICE — DRESSING TEGADERM IV 3.5 X 4.5

## (undated) DEVICE — STOCKINET TUBULAR 1 PLY 6X60IN

## (undated) DEVICE — KIT MICROINTRO 4F .018X40X7CM

## (undated) DEVICE — SEE MEDLINE ITEM 152515

## (undated) DEVICE — CATH BLLN FG APEX MR 2.00X8MM

## (undated) DEVICE — DRESSING TRANS 4X4 TEGADERM

## (undated) DEVICE — SPONGE LAP 18X18 PREWASHED

## (undated) DEVICE — SUT MONOCRYL 3-0 SH U/D

## (undated) DEVICE — SEE MEDLINE ITEM 157187

## (undated) DEVICE — SYR ONLY LUER LOCK 20CC

## (undated) DEVICE — CATH BLLN FG APEX MR 2.50X8MM

## (undated) DEVICE — BLADE SAGITTA 5/BX

## (undated) DEVICE — LOOP VESSEL BLUE MAXI

## (undated) DEVICE — INTRODUCER VASC RADPQ 5FRX10CM

## (undated) DEVICE — KIT IRR SUCTION HND PIECE

## (undated) DEVICE — GOWN SURGICAL X-LARGE

## (undated) DEVICE — TUBE SUCTION YANKAUER

## (undated) DEVICE — TRAY MINOR ORTHO

## (undated) DEVICE — SUT MCRYL PLUS 4-0 PS2 27IN

## (undated) DEVICE — WIRE WHISPER MS 014 X 190

## (undated) DEVICE — CATH ANGLED GLIDE CATH 5FR

## (undated) DEVICE — SPONGE DERMACEA 4X4IN 12PLY

## (undated) DEVICE — DRESSING XEROFORM 1X8IN

## (undated) DEVICE — SUT SILK 2-0 SH 18IN BLACK

## (undated) DEVICE — SUT 2-0 12-18IN SILK

## (undated) DEVICE — BLADE SURG CARBON STEEL SZ11

## (undated) DEVICE — SYR 10CC LUER LOCK

## (undated) DEVICE — CATH INFINITI JUDKINS JR4

## (undated) DEVICE — CATH 5FR OMNIFLUSH 65CM .038

## (undated) DEVICE — TUBE PENROSE DRAIN 18IN X 3/8I

## (undated) DEVICE — APPLICATOR CHLORAPREP ORN 26ML

## (undated) DEVICE — SHIELD EYE PLASTIC 3100G

## (undated) DEVICE — KIT CUSTOM MANIFOLD

## (undated) DEVICE — Device

## (undated) DEVICE — SHEATH INTRODUCER 6FR 11CM

## (undated) DEVICE — BLLN MUSTANG 5X60X75

## (undated) DEVICE — SUT 7/0 24IN PROLENE BL MO

## (undated) DEVICE — OMNIPAQUE 350 200ML

## (undated) DEVICE — BANDAGE ACE ELASTIC 6"

## (undated) DEVICE — SPONGE DERMACEA GAUZE 4X4

## (undated) DEVICE — DRESSING TRANS 2X2 TEGADERM

## (undated) DEVICE — GUIDE WIRE BMW 014 X190

## (undated) DEVICE — TRAY MINOR GEN SURG

## (undated) DEVICE — SUT 4-0 12-18IN SILK BLACK

## (undated) DEVICE — COVERS PROBE NR-48 STERILE

## (undated) DEVICE — GUIDE WIRE WHOLLY FLOPPY

## (undated) DEVICE — STOPCOCK 3 WAY MED PRESSURE

## (undated) DEVICE — GUIDEWIRE SUPRA CORE 035 190CM

## (undated) DEVICE — GUIDEWIRE ADVNTG 018X300CM ANG

## (undated) DEVICE — GUIDE LAUNCHER 8FR JL 4

## (undated) DEVICE — SUT PROLENE 6-0 24 BV-1

## (undated) DEVICE — SYS LABEL CORRECT MED

## (undated) DEVICE — CATH IMPULSE FL4 5FR 100CM

## (undated) DEVICE — SHEATH INTRODUCER 8FR 11CM

## (undated) DEVICE — INTRODUCER 5FR 70CM

## (undated) DEVICE — PROTECTION STATION PLUS

## (undated) DEVICE — SEE MEDLINE ITEM 152622

## (undated) DEVICE — GAUZE SPONGE 4X4 12PLY

## (undated) DEVICE — SPONGE GAUZE 16PLY 4X4

## (undated) DEVICE — DRAPE PLASTIC U 60X72

## (undated) DEVICE — COVER INSTR ELASTIC BAND 40X20

## (undated) DEVICE — SEE MEDLINE ITEM 157150

## (undated) DEVICE — SET DECANTER MEDICHOICE

## (undated) DEVICE — SEE L#120831

## (undated) DEVICE — DRAIN PENROSE XRAY 12 X 1/4 ST

## (undated) DEVICE — PADDING CAST 4IN SPECIALIST

## (undated) DEVICE — ELECTRODE REM PLYHSV RETURN 9

## (undated) DEVICE — SHEATH GUIDE 5FR 55CM FLEXOR

## (undated) DEVICE — GLOVE BIOGEL SKINSENSE PI 7.5

## (undated) DEVICE — SYR SLIP TIP 1CC

## (undated) DEVICE — BOOT SUTURE AID

## (undated) DEVICE — SOL NS 1000CC

## (undated) DEVICE — BANDAGE DERMACEA STRETCH 4X1IN

## (undated) DEVICE — SEE MEDLINE ITEM 157173

## (undated) DEVICE — DRAPE STERI U-SHAPED 47X51IN

## (undated) DEVICE — STOCKINET 4INX48

## (undated) DEVICE — DEVICE PERCLOSE SUT CLSR 6FR

## (undated) DEVICE — CATH DXTERITY PG145 110CM 6FR

## (undated) DEVICE — DRESSING TELFA STRL 4X3 LF

## (undated) DEVICE — SEE MEDLINE ITEM 157216

## (undated) DEVICE — SEE MEDLINE ITEM 146298

## (undated) DEVICE — SOL BETADINE 5%

## (undated) DEVICE — CATH IMPULSE 5F 100CM FR4

## (undated) DEVICE — SUT LIGACLIP SMALL XTRA

## (undated) DEVICE — TOURNIQUET SB QC DP 34X4IN

## (undated) DEVICE — SEE MEDLINE ITEM 152522

## (undated) DEVICE — SUT 3-0 12-18IN SILK

## (undated) DEVICE — SOL 9P NACL IRR PIC IL

## (undated) DEVICE — GUIDEWIRE STF .035X180CM ANG

## (undated) DEVICE — SUT PROLENE 2-0 FS

## (undated) DEVICE — GUIDEWIRE AMPLATZ .035X260 SS

## (undated) DEVICE — SEE MEDLINE ITEM 146345

## (undated) DEVICE — DRESSING XEROFORM FOIL PK 1X8

## (undated) DEVICE — SEE MEDLINE ITEM 154981

## (undated) DEVICE — SYS PANNUS RETENTION

## (undated) DEVICE — WIRE GUIDE SAFE-T-J .035 260CM

## (undated) DEVICE — BANDAGE CONFORM 3IN STRL

## (undated) DEVICE — GUIDE VISTA XB 3.5

## (undated) DEVICE — DEVICE CLOSURE MYNX GRIP 5FR

## (undated) DEVICE — SPIKE CONTRAST CONTROLLER

## (undated) DEVICE — PAD ABD 8X10 STERILE

## (undated) DEVICE — SEE MEDLINE ITEM 146417

## (undated) DEVICE — VALVE CONTROL COPILOT

## (undated) DEVICE — TAPE GLOW & TELL NON-ST

## (undated) DEVICE — BANDAGE ESMARK 6X12

## (undated) DEVICE — GLOVE BIOGEL SKINSENSE PI 6.5

## (undated) DEVICE — VISE RADIFOCUS MULTI TORQUE

## (undated) DEVICE — DRESSING XEROFORM STRL 2X2

## (undated) DEVICE — BLADE SYSTEM 6 25MMX90MMX1.27M

## (undated) DEVICE — INFLATOR ENCORE 26 BLLN INFL

## (undated) DEVICE — GOWN SMART IMP BREATHABLE XXLG

## (undated) DEVICE — SUT CTD VICRYL UND BR CP-1

## (undated) DEVICE — PAD DEFIB CADENCE ADULT R2

## (undated) DEVICE — SHEATH 7F X 45 ANSEL

## (undated) DEVICE — PAD CAST SPECIALIST STRL 4

## (undated) DEVICE — NDL 18GA X1 1/2 REG BEVEL

## (undated) DEVICE — HEMOSTAT VASC BAND LONG 27CM

## (undated) DEVICE — GUIDEWIRE STD .035X180CM ANG

## (undated) DEVICE — KIT PROBE COVER WITH GEL

## (undated) DEVICE — INFLATOR ENCORE

## (undated) DEVICE — CATH OMNI FLUSH 4FR 65CM

## (undated) DEVICE — CATH FL 3.5 5FR

## (undated) DEVICE — TAPE UMBILICAL 1/8X36IN WHITE

## (undated) DEVICE — GUIDEWIRE STF .035X260CM ANG

## (undated) DEVICE — CLIP MED TICALL

## (undated) DEVICE — CATH ULTRAVERSE 035 5X80X130

## (undated) DEVICE — SEE MEDLINE ITEM 152529

## (undated) DEVICE — SEE MEDLINE ITEM 153688

## (undated) DEVICE — BLADE SURG #15 CARBON STEEL

## (undated) DEVICE — DRAPE STERI-DRAPE 1000 17X11IN

## (undated) DEVICE — CATH INFINITI 4F JL4 .042X100

## (undated) DEVICE — CATH PERFORMA IMA .046IN 65CM

## (undated) DEVICE — KIT GLIDESHEATH SLEND 6FR 10CM

## (undated) DEVICE — KIT CO-PILOT

## (undated) DEVICE — NDL HYPO 27G X 1 1/2

## (undated) DEVICE — CASSETTE INFINITI

## (undated) DEVICE — CATH GLIDE ANGLED 5FR 65CM

## (undated) DEVICE — PAD CAST SPECIALIST STRL 6

## (undated) DEVICE — CATH JL5 4FR INFINITY

## (undated) DEVICE — GUIDEWIRE AMPLATZ .035X260

## (undated) DEVICE — SEE MEDLINE ITEM 146322

## (undated) DEVICE — SEE MEDLINE ITEM 157131